# Patient Record
Sex: MALE | Race: WHITE | HISPANIC OR LATINO | Employment: UNEMPLOYED | URBAN - METROPOLITAN AREA
[De-identification: names, ages, dates, MRNs, and addresses within clinical notes are randomized per-mention and may not be internally consistent; named-entity substitution may affect disease eponyms.]

---

## 2018-04-11 LAB
ABSOL LYMPHOCYTES (HISTORICAL): 2 K/UL (ref 0.5–4)
ALBUMIN SERPL BCP-MCNC: 4.5 G/DL (ref 3–5.2)
ALP SERPL-CCNC: 54 U/L (ref 43–122)
ALT SERPL W P-5'-P-CCNC: 40 U/L (ref 9–52)
ANION GAP SERPL CALCULATED.3IONS-SCNC: 10 MMOL/L (ref 5–14)
AST SERPL W P-5'-P-CCNC: 28 U/L (ref 17–59)
BASOPHILS # BLD AUTO: 0 % (ref 0–1)
BASOPHILS # BLD AUTO: 0 K/UL (ref 0–0.1)
BILIRUB SERPL-MCNC: 0.4 MG/DL
BUN SERPL-MCNC: 9 MG/DL (ref 5–25)
CALCIUM SERPL-MCNC: 9.8 MG/DL (ref 8.4–10.2)
CHLORIDE SERPL-SCNC: 104 MEQ/L (ref 97–108)
CHOLEST SERPL-MCNC: 146 MG/DL
CHOLEST/HDLC SERPL: 2.6 {RATIO}
CO2 SERPL-SCNC: 28 MMOL/L (ref 22–30)
CREATINE, SERUM (HISTORICAL): 0.77 MG/DL (ref 0.7–1.5)
DEPRECATED RDW RBC AUTO: 12.6 %
EGFR (HISTORICAL): >60 ML/MIN/1.73 M2
EOSINOPHIL # BLD AUTO: 0.2 K/UL (ref 0–0.4)
EOSINOPHIL NFR BLD AUTO: 2 % (ref 0–6)
EST. AVERAGE GLUCOSE BLD GHB EST-MCNC: 206 MG/DL
GLUCOSE SERPL-MCNC: 242 MG/DL (ref 70–99)
HBA1C MFR BLD HPLC: 8.8 %
HCT VFR BLD AUTO: 44.7 % (ref 41–53)
HDLC SERPL-MCNC: 57 MG/DL
HGB BLD-MCNC: 14.9 G/DL (ref 13.5–17.5)
LDL/HDL RATIO (HISTORICAL): 1.2
LDLC SERPL CALC-MCNC: 70 MG/DL
LYMPHOCYTES NFR BLD AUTO: 23 % (ref 25–45)
MCH RBC QN AUTO: 31 PG (ref 26–34)
MCHC RBC AUTO-ENTMCNC: 33.3 % (ref 31–36)
MCV RBC AUTO: 93 FL (ref 80–100)
MONOCYTES # BLD AUTO: 0.7 K/UL (ref 0.2–0.9)
MONOCYTES NFR BLD AUTO: 8 % (ref 1–10)
NEUTROPHILS ABS COUNT (HISTORICAL): 5.9 K/UL (ref 1.8–7.8)
NEUTS SEG NFR BLD AUTO: 67 % (ref 45–65)
PLATELET # BLD AUTO: 238 K/MCL (ref 150–450)
POTASSIUM SERPL-SCNC: 4.6 MEQ/L (ref 3.6–5)
RBC # BLD AUTO: 4.81 M/MCL (ref 4.5–5.9)
SODIUM SERPL-SCNC: 143 MEQ/L (ref 137–147)
TOTAL PROTEIN (HISTORICAL): 7.4 G/DL (ref 5.9–8.4)
TRIGL SERPL-MCNC: 96 MG/DL
TSH SERPL DL<=0.05 MIU/L-ACNC: 2.3 UIU/ML (ref 0.47–4.68)
VLDLC SERPL CALC-MCNC: 19 MG/DL (ref 0–40)
WBC # BLD AUTO: 8.9 K/MCL (ref 4.5–11)

## 2018-08-13 ENCOUNTER — TELEPHONE (OUTPATIENT)
Dept: FAMILY MEDICINE CLINIC | Facility: CLINIC | Age: 48
End: 2018-08-13

## 2018-08-13 DIAGNOSIS — Z79.4 TYPE 2 DIABETES MELLITUS WITH HYPERGLYCEMIA, WITH LONG-TERM CURRENT USE OF INSULIN (HCC): Primary | ICD-10-CM

## 2018-08-13 DIAGNOSIS — E11.65 TYPE 2 DIABETES MELLITUS WITH HYPERGLYCEMIA, WITH LONG-TERM CURRENT USE OF INSULIN (HCC): Primary | ICD-10-CM

## 2018-08-13 DIAGNOSIS — I10 ESSENTIAL HYPERTENSION: ICD-10-CM

## 2018-08-13 RX ORDER — INSULIN GLARGINE 100 [IU]/ML
25 INJECTION, SOLUTION SUBCUTANEOUS DAILY
Qty: 10 ML | Refills: 0 | Status: SHIPPED | OUTPATIENT
Start: 2018-08-13 | End: 2018-08-15

## 2018-08-13 RX ORDER — LISINOPRIL AND HYDROCHLOROTHIAZIDE 20; 12.5 MG/1; MG/1
1 TABLET ORAL DAILY
Qty: 30 TABLET | Refills: 0 | Status: SHIPPED | OUTPATIENT
Start: 2018-08-13 | End: 2018-10-03 | Stop reason: SDUPTHER

## 2018-08-13 NOTE — TELEPHONE ENCOUNTER
Patient requesting refills on his medications pt states all meds  He doesn't remember how to say the blood pressure one    Metformin 500mg  Lantus(insulin)

## 2018-08-15 ENCOUNTER — TELEPHONE (OUTPATIENT)
Dept: FAMILY MEDICINE CLINIC | Facility: CLINIC | Age: 48
End: 2018-08-15

## 2018-08-15 DIAGNOSIS — Z79.4 TYPE 2 DIABETES MELLITUS WITH HYPERGLYCEMIA, WITH LONG-TERM CURRENT USE OF INSULIN (HCC): Primary | ICD-10-CM

## 2018-08-15 DIAGNOSIS — E11.65 TYPE 2 DIABETES MELLITUS WITH HYPERGLYCEMIA, WITH LONG-TERM CURRENT USE OF INSULIN (HCC): Primary | ICD-10-CM

## 2018-10-03 DIAGNOSIS — Z79.4 TYPE 2 DIABETES MELLITUS WITHOUT COMPLICATION, WITH LONG-TERM CURRENT USE OF INSULIN (HCC): Primary | ICD-10-CM

## 2018-10-03 DIAGNOSIS — Z79.4 TYPE 2 DIABETES MELLITUS WITH HYPERGLYCEMIA, WITH LONG-TERM CURRENT USE OF INSULIN (HCC): ICD-10-CM

## 2018-10-03 DIAGNOSIS — E11.9 TYPE 2 DIABETES MELLITUS WITHOUT COMPLICATION, WITH LONG-TERM CURRENT USE OF INSULIN (HCC): Primary | ICD-10-CM

## 2018-10-03 DIAGNOSIS — I10 ESSENTIAL HYPERTENSION: ICD-10-CM

## 2018-10-03 DIAGNOSIS — E11.65 TYPE 2 DIABETES MELLITUS WITH HYPERGLYCEMIA, WITH LONG-TERM CURRENT USE OF INSULIN (HCC): ICD-10-CM

## 2018-10-03 RX ORDER — PEN NEEDLE, DIABETIC 32GX 5/32"
NEEDLE, DISPOSABLE MISCELLANEOUS
Qty: 100 EACH | Refills: 0 | Status: SHIPPED | OUTPATIENT
Start: 2018-10-03 | End: 2019-06-27 | Stop reason: SDUPTHER

## 2018-10-09 RX ORDER — LISINOPRIL AND HYDROCHLOROTHIAZIDE 20; 12.5 MG/1; MG/1
TABLET ORAL
Qty: 30 TABLET | Refills: 0 | Status: SHIPPED | OUTPATIENT
Start: 2018-10-09 | End: 2019-02-12 | Stop reason: HOSPADM

## 2019-02-02 ENCOUNTER — APPOINTMENT (EMERGENCY)
Dept: CT IMAGING | Facility: HOSPITAL | Age: 49
DRG: 469 | End: 2019-02-02
Payer: COMMERCIAL

## 2019-02-02 ENCOUNTER — HOSPITAL ENCOUNTER (INPATIENT)
Facility: HOSPITAL | Age: 49
LOS: 10 days | Discharge: HOME | DRG: 469 | End: 2019-02-12
Attending: EMERGENCY MEDICINE | Admitting: INTERNAL MEDICINE
Payer: COMMERCIAL

## 2019-02-02 ENCOUNTER — APPOINTMENT (EMERGENCY)
Dept: RADIOLOGY | Facility: HOSPITAL | Age: 49
DRG: 469 | End: 2019-02-02
Payer: COMMERCIAL

## 2019-02-02 DIAGNOSIS — Z79.4 TYPE 2 DIABETES MELLITUS WITH HYPERGLYCEMIA, WITH LONG-TERM CURRENT USE OF INSULIN (HCC): ICD-10-CM

## 2019-02-02 DIAGNOSIS — E87.1 HYPONATREMIA: ICD-10-CM

## 2019-02-02 DIAGNOSIS — N18.30 ANEMIA DUE TO STAGE 3 CHRONIC KIDNEY DISEASE (HCC): ICD-10-CM

## 2019-02-02 DIAGNOSIS — D64.9 ANEMIA: Primary | ICD-10-CM

## 2019-02-02 DIAGNOSIS — F10.10 ALCOHOL ABUSE: ICD-10-CM

## 2019-02-02 DIAGNOSIS — N17.9 ACUTE KIDNEY FAILURE (HCC): ICD-10-CM

## 2019-02-02 DIAGNOSIS — E11.65 TYPE 2 DIABETES MELLITUS WITH HYPERGLYCEMIA, WITH LONG-TERM CURRENT USE OF INSULIN (HCC): ICD-10-CM

## 2019-02-02 DIAGNOSIS — N17.9 AKI (ACUTE KIDNEY INJURY) (HCC): ICD-10-CM

## 2019-02-02 DIAGNOSIS — N17.9 ACUTE RENAL FAILURE (HCC): ICD-10-CM

## 2019-02-02 DIAGNOSIS — R73.9 HYPERGLYCEMIA: ICD-10-CM

## 2019-02-02 DIAGNOSIS — D63.1 ANEMIA DUE TO STAGE 3 CHRONIC KIDNEY DISEASE (HCC): ICD-10-CM

## 2019-02-02 LAB
ABO GROUP BLD: NORMAL
ACETONE SERPL-MCNC: NEGATIVE MG/DL
ALBUMIN SERPL BCP-MCNC: 3 G/DL (ref 3.5–5)
ALP SERPL-CCNC: 74 U/L (ref 46–116)
ALT SERPL W P-5'-P-CCNC: 37 U/L (ref 12–78)
ANION GAP SERPL CALCULATED.3IONS-SCNC: 14 MMOL/L (ref 4–13)
ANION GAP SERPL CALCULATED.3IONS-SCNC: 15 MMOL/L (ref 4–13)
APTT PPP: 31 SECONDS (ref 26–38)
AST SERPL W P-5'-P-CCNC: 18 U/L (ref 5–45)
BACTERIA UR QL AUTO: ABNORMAL /HPF
BASE EX.OXY STD BLDV CALC-SCNC: 73.2 % (ref 60–80)
BASE EXCESS BLDV CALC-SCNC: -7.8 MMOL/L
BASOPHILS # BLD AUTO: 0.04 THOUSANDS/ΜL (ref 0–0.1)
BASOPHILS NFR BLD AUTO: 0 % (ref 0–1)
BETA-HYDROXYBUTYRATE: 0.16 MMOL/L (ref 0.02–0.27)
BILIRUB SERPL-MCNC: 0.47 MG/DL (ref 0.2–1)
BILIRUB UR QL STRIP: NEGATIVE
BLD GP AB SCN SERPL QL: NEGATIVE
BUN SERPL-MCNC: 69 MG/DL (ref 5–25)
BUN SERPL-MCNC: 78 MG/DL (ref 5–25)
CALCIUM SERPL-MCNC: 7.1 MG/DL (ref 8.3–10.1)
CALCIUM SERPL-MCNC: 7.2 MG/DL (ref 8.3–10.1)
CHLORIDE SERPL-SCNC: 103 MMOL/L (ref 100–108)
CHLORIDE SERPL-SCNC: 90 MMOL/L (ref 100–108)
CLARITY UR: CLEAR
CO2 SERPL-SCNC: 18 MMOL/L (ref 21–32)
CO2 SERPL-SCNC: 19 MMOL/L (ref 21–32)
COLOR UR: YELLOW
CREAT SERPL-MCNC: 6.58 MG/DL (ref 0.6–1.3)
CREAT SERPL-MCNC: 7.65 MG/DL (ref 0.6–1.3)
EOSINOPHIL # BLD AUTO: 0.27 THOUSAND/ΜL (ref 0–0.61)
EOSINOPHIL NFR BLD AUTO: 3 % (ref 0–6)
ERYTHROCYTE [DISTWIDTH] IN BLOOD BY AUTOMATED COUNT: 11.6 % (ref 11.6–15.1)
GFR SERPL CREATININE-BSD FRML MDRD: 8 ML/MIN/1.73SQ M
GFR SERPL CREATININE-BSD FRML MDRD: 9 ML/MIN/1.73SQ M
GLUCOSE SERPL-MCNC: 115 MG/DL (ref 65–140)
GLUCOSE SERPL-MCNC: 129 MG/DL (ref 65–140)
GLUCOSE SERPL-MCNC: 135 MG/DL (ref 65–140)
GLUCOSE SERPL-MCNC: 157 MG/DL (ref 65–140)
GLUCOSE SERPL-MCNC: 202 MG/DL (ref 65–140)
GLUCOSE SERPL-MCNC: 295 MG/DL (ref 65–140)
GLUCOSE SERPL-MCNC: 353 MG/DL (ref 65–140)
GLUCOSE SERPL-MCNC: 36 MG/DL (ref 65–140)
GLUCOSE SERPL-MCNC: 448 MG/DL (ref 65–140)
GLUCOSE SERPL-MCNC: 72 MG/DL (ref 65–140)
GLUCOSE SERPL-MCNC: 856 MG/DL (ref 65–140)
GLUCOSE SERPL-MCNC: 94 MG/DL (ref 65–140)
GLUCOSE SERPL-MCNC: >500 MG/DL (ref 65–140)
GLUCOSE UR STRIP-MCNC: ABNORMAL MG/DL
HCO3 BLDV-SCNC: 17.6 MMOL/L (ref 24–30)
HCT VFR BLD AUTO: 17.8 % (ref 36.5–49.3)
HGB BLD-MCNC: 6 G/DL (ref 12–17)
HGB UR QL STRIP.AUTO: ABNORMAL
IMM GRANULOCYTES # BLD AUTO: 0.12 THOUSAND/UL (ref 0–0.2)
IMM GRANULOCYTES NFR BLD AUTO: 1 % (ref 0–2)
INR PPP: 1.01 (ref 0.86–1.17)
KETONES UR STRIP-MCNC: NEGATIVE MG/DL
LACTATE SERPL-SCNC: 2.5 MMOL/L (ref 0.5–2)
LACTATE SERPL-SCNC: 3.1 MMOL/L (ref 0.5–2)
LEUKOCYTE ESTERASE UR QL STRIP: ABNORMAL
LIPASE SERPL-CCNC: 29 U/L (ref 73–393)
LYMPHOCYTES # BLD AUTO: 1.16 THOUSANDS/ΜL (ref 0.6–4.47)
LYMPHOCYTES NFR BLD AUTO: 13 % (ref 14–44)
MAGNESIUM SERPL-MCNC: 1.3 MG/DL (ref 1.6–2.6)
MCH RBC QN AUTO: 33.9 PG (ref 26.8–34.3)
MCHC RBC AUTO-ENTMCNC: 33.7 G/DL (ref 31.4–37.4)
MCV RBC AUTO: 101 FL (ref 82–98)
MONOCYTES # BLD AUTO: 0.97 THOUSAND/ΜL (ref 0.17–1.22)
MONOCYTES NFR BLD AUTO: 11 % (ref 4–12)
NEUTROPHILS # BLD AUTO: 6.37 THOUSANDS/ΜL (ref 1.85–7.62)
NEUTS SEG NFR BLD AUTO: 72 % (ref 43–75)
NITRITE UR QL STRIP: NEGATIVE
NON-SQ EPI CELLS URNS QL MICRO: ABNORMAL /HPF
NRBC BLD AUTO-RTO: 0 /100 WBCS
O2 CT BLDV-SCNC: 7.2 ML/DL
OSMOLALITY UR/SERPL-RTO: 326 MMOL/KG (ref 282–298)
PCO2 BLDV: 35.3 MM HG (ref 42–50)
PH BLDV: 7.32 [PH] (ref 7.3–7.4)
PH UR STRIP.AUTO: 5 [PH] (ref 4.5–8)
PHOSPHATE SERPL-MCNC: 4.5 MG/DL (ref 2.7–4.5)
PLATELET # BLD AUTO: 404 THOUSANDS/UL (ref 149–390)
PMV BLD AUTO: 11.4 FL (ref 8.9–12.7)
PO2 BLDV: 41.7 MM HG (ref 35–45)
POTASSIUM SERPL-SCNC: 3.5 MMOL/L (ref 3.5–5.3)
POTASSIUM SERPL-SCNC: 5.1 MMOL/L (ref 3.5–5.3)
PROCALCITONIN SERPL-MCNC: 0.42 NG/ML
PROT SERPL-MCNC: 6.1 G/DL (ref 6.4–8.2)
PROT UR STRIP-MCNC: NEGATIVE MG/DL
PROTHROMBIN TIME: 13.4 SECONDS (ref 11.8–14.2)
RBC # BLD AUTO: 1.77 MILLION/UL (ref 3.88–5.62)
RBC #/AREA URNS AUTO: ABNORMAL /HPF
RH BLD: POSITIVE
SODIUM SERPL-SCNC: 122 MMOL/L (ref 136–145)
SODIUM SERPL-SCNC: 137 MMOL/L (ref 136–145)
SP GR UR STRIP.AUTO: 1.01 (ref 1–1.03)
SPECIMEN EXPIRATION DATE: NORMAL
TROPONIN I SERPL-MCNC: <0.02 NG/ML
UROBILINOGEN UR QL STRIP.AUTO: 0.2 E.U./DL
WBC # BLD AUTO: 8.93 THOUSAND/UL (ref 4.31–10.16)
WBC #/AREA URNS AUTO: ABNORMAL /HPF

## 2019-02-02 PROCEDURE — 30233N1 TRANSFUSION OF NONAUTOLOGOUS RED BLOOD CELLS INTO PERIPHERAL VEIN, PERCUTANEOUS APPROACH: ICD-10-PCS | Performed by: EMERGENCY MEDICINE

## 2019-02-02 PROCEDURE — 99285 EMERGENCY DEPT VISIT HI MDM: CPT

## 2019-02-02 PROCEDURE — 81001 URINALYSIS AUTO W/SCOPE: CPT | Performed by: EMERGENCY MEDICINE

## 2019-02-02 PROCEDURE — 36430 TRANSFUSION BLD/BLD COMPNT: CPT

## 2019-02-02 PROCEDURE — 82607 VITAMIN B-12: CPT | Performed by: INTERNAL MEDICINE

## 2019-02-02 PROCEDURE — 82746 ASSAY OF FOLIC ACID SERUM: CPT | Performed by: INTERNAL MEDICINE

## 2019-02-02 PROCEDURE — 82550 ASSAY OF CK (CPK): CPT | Performed by: INTERNAL MEDICINE

## 2019-02-02 PROCEDURE — 84484 ASSAY OF TROPONIN QUANT: CPT | Performed by: EMERGENCY MEDICINE

## 2019-02-02 PROCEDURE — 83690 ASSAY OF LIPASE: CPT | Performed by: EMERGENCY MEDICINE

## 2019-02-02 PROCEDURE — 96374 THER/PROPH/DIAG INJ IV PUSH: CPT

## 2019-02-02 PROCEDURE — 83615 LACTATE (LD) (LDH) ENZYME: CPT | Performed by: INTERNAL MEDICINE

## 2019-02-02 PROCEDURE — 86920 COMPATIBILITY TEST SPIN: CPT

## 2019-02-02 PROCEDURE — 86850 RBC ANTIBODY SCREEN: CPT | Performed by: EMERGENCY MEDICINE

## 2019-02-02 PROCEDURE — 96361 HYDRATE IV INFUSION ADD-ON: CPT

## 2019-02-02 PROCEDURE — 74176 CT ABD & PELVIS W/O CONTRAST: CPT

## 2019-02-02 PROCEDURE — 84100 ASSAY OF PHOSPHORUS: CPT | Performed by: NURSE PRACTITIONER

## 2019-02-02 PROCEDURE — 70450 CT HEAD/BRAIN W/O DYE: CPT

## 2019-02-02 PROCEDURE — 36415 COLL VENOUS BLD VENIPUNCTURE: CPT | Performed by: EMERGENCY MEDICINE

## 2019-02-02 PROCEDURE — 80053 COMPREHEN METABOLIC PANEL: CPT | Performed by: EMERGENCY MEDICINE

## 2019-02-02 PROCEDURE — 84145 PROCALCITONIN (PCT): CPT | Performed by: EMERGENCY MEDICINE

## 2019-02-02 PROCEDURE — 82948 REAGENT STRIP/BLOOD GLUCOSE: CPT

## 2019-02-02 PROCEDURE — 86901 BLOOD TYPING SEROLOGIC RH(D): CPT | Performed by: EMERGENCY MEDICINE

## 2019-02-02 PROCEDURE — 85045 AUTOMATED RETICULOCYTE COUNT: CPT | Performed by: INTERNAL MEDICINE

## 2019-02-02 PROCEDURE — P9021 RED BLOOD CELLS UNIT: HCPCS

## 2019-02-02 PROCEDURE — 83735 ASSAY OF MAGNESIUM: CPT | Performed by: NURSE PRACTITIONER

## 2019-02-02 PROCEDURE — 85610 PROTHROMBIN TIME: CPT | Performed by: EMERGENCY MEDICINE

## 2019-02-02 PROCEDURE — 82009 KETONE BODYS QUAL: CPT | Performed by: EMERGENCY MEDICINE

## 2019-02-02 PROCEDURE — 82010 KETONE BODYS QUAN: CPT | Performed by: INTERNAL MEDICINE

## 2019-02-02 PROCEDURE — 83010 ASSAY OF HAPTOGLOBIN QUANT: CPT | Performed by: INTERNAL MEDICINE

## 2019-02-02 PROCEDURE — 82805 BLOOD GASES W/O2 SATURATION: CPT | Performed by: EMERGENCY MEDICINE

## 2019-02-02 PROCEDURE — 87040 BLOOD CULTURE FOR BACTERIA: CPT | Performed by: EMERGENCY MEDICINE

## 2019-02-02 PROCEDURE — 85730 THROMBOPLASTIN TIME PARTIAL: CPT | Performed by: EMERGENCY MEDICINE

## 2019-02-02 PROCEDURE — 71045 X-RAY EXAM CHEST 1 VIEW: CPT

## 2019-02-02 PROCEDURE — 86900 BLOOD TYPING SEROLOGIC ABO: CPT | Performed by: EMERGENCY MEDICINE

## 2019-02-02 PROCEDURE — 83605 ASSAY OF LACTIC ACID: CPT | Performed by: EMERGENCY MEDICINE

## 2019-02-02 PROCEDURE — 99223 1ST HOSP IP/OBS HIGH 75: CPT | Performed by: NURSE PRACTITIONER

## 2019-02-02 PROCEDURE — 80048 BASIC METABOLIC PNL TOTAL CA: CPT | Performed by: NURSE PRACTITIONER

## 2019-02-02 PROCEDURE — 85025 COMPLETE CBC W/AUTO DIFF WBC: CPT | Performed by: EMERGENCY MEDICINE

## 2019-02-02 PROCEDURE — 99254 IP/OBS CNSLTJ NEW/EST MOD 60: CPT | Performed by: INTERNAL MEDICINE

## 2019-02-02 PROCEDURE — 83930 ASSAY OF BLOOD OSMOLALITY: CPT | Performed by: EMERGENCY MEDICINE

## 2019-02-02 RX ORDER — HEPARIN SODIUM 5000 [USP'U]/ML
5000 INJECTION, SOLUTION INTRAVENOUS; SUBCUTANEOUS EVERY 8 HOURS SCHEDULED
Status: DISCONTINUED | OUTPATIENT
Start: 2019-02-02 | End: 2019-02-12 | Stop reason: HOSPADM

## 2019-02-02 RX ORDER — SODIUM CHLORIDE 9 MG/ML
2000 INJECTION, SOLUTION INTRAVENOUS CONTINUOUS
Status: DISPENSED | OUTPATIENT
Start: 2019-02-02 | End: 2019-02-02

## 2019-02-02 RX ORDER — DEXTROSE AND SODIUM CHLORIDE 5; .9 G/100ML; G/100ML
125 INJECTION, SOLUTION INTRAVENOUS CONTINUOUS
Status: DISCONTINUED | OUTPATIENT
Start: 2019-02-02 | End: 2019-02-03

## 2019-02-02 RX ORDER — SODIUM CHLORIDE 9 MG/ML
500 INJECTION, SOLUTION INTRAVENOUS CONTINUOUS
Status: DISPENSED | OUTPATIENT
Start: 2019-02-02 | End: 2019-02-02

## 2019-02-02 RX ORDER — DEXTROSE MONOHYDRATE 25 G/50ML
INJECTION, SOLUTION INTRAVENOUS
Status: COMPLETED
Start: 2019-02-02 | End: 2019-02-02

## 2019-02-02 RX ORDER — MAGNESIUM SULFATE HEPTAHYDRATE 40 MG/ML
2 INJECTION, SOLUTION INTRAVENOUS ONCE
Status: COMPLETED | OUTPATIENT
Start: 2019-02-02 | End: 2019-02-03

## 2019-02-02 RX ORDER — MAGNESIUM SULFATE HEPTAHYDRATE 40 MG/ML
4 INJECTION, SOLUTION INTRAVENOUS ONCE
Status: COMPLETED | OUTPATIENT
Start: 2019-02-02 | End: 2019-02-03

## 2019-02-02 RX ORDER — POTASSIUM CHLORIDE 14.9 MG/ML
20 INJECTION INTRAVENOUS
Status: COMPLETED | OUTPATIENT
Start: 2019-02-02 | End: 2019-02-02

## 2019-02-02 RX ORDER — SODIUM CHLORIDE 9 MG/ML
250 INJECTION, SOLUTION INTRAVENOUS CONTINUOUS
Status: DISPENSED | OUTPATIENT
Start: 2019-02-02 | End: 2019-02-03

## 2019-02-02 RX ADMIN — DEXTROSE AND SODIUM CHLORIDE 250 ML/HR: 5; .9 INJECTION, SOLUTION INTRAVENOUS at 17:30

## 2019-02-02 RX ADMIN — SODIUM CHLORIDE 15 UNITS/HR: 9 INJECTION, SOLUTION INTRAVENOUS at 15:26

## 2019-02-02 RX ADMIN — POTASSIUM CHLORIDE 20 MEQ: 200 INJECTION, SOLUTION INTRAVENOUS at 20:52

## 2019-02-02 RX ADMIN — SODIUM CHLORIDE 1000 ML: 0.9 INJECTION, SOLUTION INTRAVENOUS at 14:00

## 2019-02-02 RX ADMIN — POTASSIUM CHLORIDE 20 MEQ: 200 INJECTION, SOLUTION INTRAVENOUS at 22:17

## 2019-02-02 RX ADMIN — DEXTROSE AND SODIUM CHLORIDE 250 ML/HR: 5; .9 INJECTION, SOLUTION INTRAVENOUS at 21:19

## 2019-02-02 RX ADMIN — SODIUM CHLORIDE 1000 ML: 0.9 INJECTION, SOLUTION INTRAVENOUS at 13:02

## 2019-02-02 RX ADMIN — DEXTROSE 50 % IN WATER (D50W) INTRAVENOUS SYRINGE 25 ML: at 17:35

## 2019-02-02 RX ADMIN — MAGNESIUM SULFATE HEPTAHYDRATE 4 G: 40 INJECTION, SOLUTION INTRAVENOUS at 21:04

## 2019-02-02 RX ADMIN — SODIUM CHLORIDE 5 UNITS/HR: 9 INJECTION, SOLUTION INTRAVENOUS at 15:46

## 2019-02-02 RX ADMIN — INSULIN HUMAN 10 UNITS: 100 INJECTION, SOLUTION PARENTERAL at 14:02

## 2019-02-02 RX ADMIN — HEPARIN SODIUM 5000 UNITS: 5000 INJECTION INTRAVENOUS; SUBCUTANEOUS at 16:55

## 2019-02-02 RX ADMIN — SODIUM CHLORIDE 500 ML/HR: 0.9 INJECTION, SOLUTION INTRAVENOUS at 16:02

## 2019-02-02 NOTE — ED NOTES
Dr Lachelle Saenz at pts bedside for re evaluation and discussion of lab results      Radha Cristina RN  02/02/19 4663

## 2019-02-02 NOTE — ED PROVIDER NOTES
History  Chief Complaint   Patient presents with    Dizziness     dizziness, back pain, hx kidney issues  Arrived yesterday from , was hospitalized there for "kidney problems", states physician states he need to come to [address] for further treatment  occasional CP/SOB  50year-old male Turkish-speaking only presenting with multiple complaints  Patient reportedly lives in the area, however was visiting the [facility] for the past 1 month  Apparently, he became sick about 2 weeks ago while there with nausea, vomiting and diarrhea  He reports that he was hospitalized there twice, most recently for the past 2 days and discharged yesterday  He flew home last night and comes to the ED today with worsening symptoms  He reports that while he was there, his glucose was elevated and he was on an insulin drip  He does admit that he did not have his insulin while traveling and had been without it for this past month  He also reports that he was told that he was having kidney problems and that he might need dialysis  Reports that he never had kidney problems in the past   He has been making good urine  Denies any headache, CP, fevers, abdominal pain  He does complain of low back pain, but reports that this has been an ongoing problem, occasionally will radiate down both legs  A/P:  50year old male with nausea/vomiting/generalized weakness, will get cardiac workup/septic workup/rule out DKA, patient with some confusion therefore will get CTH to rule out intracranial bleed/mass and CTA/P to assess for intra-abdominal pathology to be causing the nausea/vomiting/diarrhea/back pain            Prior to Admission Medications   Prescriptions Last Dose Informant Patient Reported? Taking?    UNIFINE PENTIPS 32G X 4 MM MISC Unknown at Unknown time  No No   Sig: TO BE USED WITH BASAGLAR   insulin glargine (BASAGLAR KWIKPEN) 100 units/mL injection pen Past Month at Unknown time  No Yes   Sig: Inject 25 Units under the skin daily at bedtime   lisinopril-hydrochlorothiazide (PRINZIDE,ZESTORETIC) 20-12 5 MG per tablet Unknown at Unknown time  No No   Sig: TAKE ONE TABLET BY MOUTH EVERY DAY   metFORMIN (GLUCOPHAGE) 500 mg tablet Unknown at Unknown time  No No   Sig: TAKE ONE TABLET BY MOUTH 2 TIMES A DAY WITH MEALS   naproxen (NAPROSYN) 500 mg tablet Unknown at Unknown time  No No   Sig: Take 1 tablet by mouth 2 (two) times a day with meals      Facility-Administered Medications: None       Past Medical History:   Diagnosis Date    Alcohol abuse     [facility] pancreatitis ([facility] )     Diabetes mellitus ([place] [address] )     Type 2    Hypertension     Pancreatitis     Paroxysmal A-fib (HCC)     Thrombocytopenia ([place] [address] )        History reviewed  No pertinent surgical history  Family History   Problem Relation Age of Onset    Diabetes Mother     Hypertension Mother     Hyperlipidemia Father      I have reviewed and agree with the history as documented  Social History   Substance Use Topics    Smoking status: Former Smoker    Smokeless tobacco: Not on file    Alcohol use Yes      Comment: Drinks alot every day        Review of Systems   Constitutional: Negative for chills, fever and unexpected weight change  HENT: Negative for ear pain, rhinorrhea and sore throat  Eyes: Negative for pain and visual disturbance  Respiratory: Negative for cough and shortness of breath  Cardiovascular: Negative for chest pain and leg swelling  Gastrointestinal: Positive for diarrhea, nausea and vomiting  Negative for abdominal pain and constipation  Endocrine: Negative for polydipsia, polyphagia and polyuria  Genitourinary: Negative for dysuria, frequency, hematuria and urgency  Musculoskeletal: Negative for back pain, myalgias and neck pain  Skin: Negative for color change and rash  Allergic/Immunologic: Negative for environmental allergies and immunocompromised state     Neurological: Negative for dizziness, weakness, light-headedness, numbness and headaches  Hematological: Negative for adenopathy  Does not bruise/bleed easily  Psychiatric/Behavioral: Negative for agitation and confusion  All other systems reviewed and are negative  Physical Exam  Physical Exam   Constitutional: He is oriented to person, place, and time  He appears well-developed and well-nourished  HENT:   Head: Normocephalic and atraumatic  Nose: Nose normal    Mouth/Throat: Oropharynx is clear and moist    Eyes: Conjunctivae and EOM are normal    Neck: Normal range of motion  Neck supple  Cardiovascular: Normal rate, regular rhythm, normal heart sounds and intact distal pulses  Pulmonary/Chest: Effort normal and breath sounds normal  No stridor  No respiratory distress  He has no wheezes  He has no rales  He exhibits no tenderness  Abdominal: Soft  He exhibits no distension  There is no tenderness  There is no rebound and no guarding  Musculoskeletal: He exhibits no edema or deformity  Neurological: He is alert and oriented to person, place, and time  He exhibits normal muscle tone  Coordination normal    Skin: Skin is warm and dry  No rash noted  Psychiatric: He has a normal mood and affect  Judgment and thought content normal    Nursing note and vitals reviewed        Vital Signs  ED Triage Vitals   Temperature Pulse Respirations Blood Pressure SpO2   02/02/19 1224 02/02/19 1224 02/02/19 1224 02/02/19 1224 02/02/19 1224   97 6 °F (36 4 °C) 87 20 135/63 100 %      Temp Source Heart Rate Source Patient Position - Orthostatic VS BP Location FiO2 (%)   02/02/19 1348 02/02/19 1348 02/02/19 1348 02/02/19 1348 --   Oral Monitor Lying Right arm       Pain Score       02/02/19 1224       Worst Possible Pain           Vitals:    02/05/19 1539 02/05/19 2300 02/06/19 0716 02/06/19 1516   BP: 122/78 121/81 117/72 118/66   Pulse: 81 65 63 63   Patient Position - Orthostatic VS: Lying Lying Lying Lying       Visual Acuity      ED Medications  Medications   heparin (porcine) subcutaneous injection 5,000 Units (5,000 Units Subcutaneous Given 2/6/19 2208)   sodium chloride 0 9 % infusion (2,000 mL/hr Intravenous Not Given 2/2/19 1600)     Followed by   sodium chloride 0 9 % infusion (500 mL/hr Intravenous New Bag 2/2/19 1602)     Followed by   sodium chloride 0 9 % infusion (250 mL/hr Intravenous Not Given 2/2/19 2105)   sodium chloride 0 9 % infusion (100 mL/hr Intravenous New Bag 2/5/19 1917)   insulin lispro (HumaLOG) 100 units/mL subcutaneous injection 1-5 Units (1 Units Subcutaneous Given 2/6/19 1700)   pantoprazole (PROTONIX) EC tablet 40 mg (40 mg Oral Given 2/6/19 0538)   thiamine (VITAMIN B1) tablet 100 mg (100 mg Oral Given 9/7/23 1595)   folic acid (FOLVITE) tablet 1 mg (1 mg Oral Given 2/6/19 0843)   insulin lispro (HumaLOG) 100 units/mL subcutaneous injection 2 Units (2 Units Subcutaneous Given 2/6/19 1700)   insulin glargine (LANTUS) subcutaneous injection 4 Units 0 04 mL (4 Units Subcutaneous Given 2/6/19 2208)   sodium chloride 0 9 % bolus 1,000 mL (0 mL Intravenous Stopped 2/2/19 1402)   sodium chloride 0 9 % bolus 1,000 mL (0 mL Intravenous Stopped 2/2/19 1500)   insulin regular (HumuLIN R,NovoLIN R) injection 10 Units (10 Units Intravenous Given 2/2/19 1402)   dextrose 50 % IV solution **ADS Override Pull** (25 mL  Given 2/2/19 1735)   potassium chloride 20 mEq IVPB (premix) (0 mEq Intravenous Stopped 2/2/19 2252)   magnesium sulfate 4 g/100 mL IVPB (premix) 4 g (0 g Intravenous Stopped 2/3/19 0104)     Followed by   magnesium sulfate 2 g/50 mL IVPB (premix) 2 g (2 g Intravenous New Bag 2/3/19 0049)   insulin glargine (LANTUS) subcutaneous injection 15 Units 0 15 mL (15 Units Subcutaneous Given 2/3/19 0739)   potassium chloride (K-DUR,KLOR-CON) CR tablet 40 mEq (40 mEq Oral Given 2/3/19 5134)   glucose 4 g chewable tablet **ADS Override Pull** (4 g  Given 2/4/19 2119)       Diagnostic Studies  Results Reviewed     Procedure Component Value Units Date/Time    Blood culture #1 [[id number]] Collected:  02/02/19 1311    Lab Status:  Preliminary result Specimen:  Blood from Arm, Left Updated:  02/06/19 2201     Blood Culture No Growth After 4 Days  Blood culture #2 [[id number]] Collected:  02/02/19 1259    Lab Status:  Preliminary result Specimen:  Blood from Hand, Left Updated:  02/06/19 1801     Blood Culture No Growth After 4 Days  Osmolality [[id number]]  (Abnormal) Collected:  02/02/19 1431    Lab Status:  Final result Specimen:  Blood from Arm, Left Updated:  02/02/19 2158     Osmolality Serum 326 (H) mmol/KG     Beta Hydroxybutyrate [[id number]]  (Normal) Collected:  02/02/19 1246    Lab Status:  Final result Specimen:  Blood Updated:  02/02/19 2038     BETA-HYDROXYBUTYRATE 0 16 mmol/L     Lactic Acid x2 [[id number]]  (Abnormal) Collected:  02/02/19 1525    Lab Status:  Final result Specimen:  Blood from Arm, Left Updated:  02/02/19 1606     LACTIC ACID 3 1 (HH) mmol/L     Narrative:         Result may be elevated if tourniquet was used during collection  Platelet count [[id number]]     Lab Status:  No result Specimen:  Blood     Fingerstick Glucose (POCT) [[id number]]  (Abnormal) Collected:  02/02/19 1512    Lab Status:  Final result Updated:  02/02/19 1513     POC Glucose 448 (H) mg/dl     Procalcitonin [[id number]]  (Abnormal) Collected:  02/02/19 1245    Lab Status:  Final result Specimen:  Blood from Arm, Right Updated:  02/02/19 1437     Procalcitonin 0 42 (H) ng/ml     Fingerstick Glucose (POCT) [[id number]]  (Abnormal) Collected:  02/02/19 1435    Lab Status:  Final result Updated:  02/02/19 1436     POC Glucose >500 (HH) mg/dl     Lactic Acid x2 [[id number]]  (Abnormal) Collected:  02/02/19 1311    Lab Status:  Final result Specimen:  Blood from Arm, Left Updated:  02/02/19 1342     LACTIC ACID 2 5 (HH) mmol/L     Narrative:         Result may be elevated if tourniquet was used during collection      Urine Microscopic [[id number]]  (Abnormal) Collected:  02/02/19 1253    Lab Status:  Final result Specimen:  Urine from Urine, Clean Catch Updated:  02/02/19 1328     RBC, UA None Seen /hpf      WBC, UA 2-4 (A) /hpf      Epithelial Cells Occasional /hpf      Bacteria, UA None Seen /hpf     Comprehensive metabolic panel [[id number]]  (Abnormal) Collected:  02/02/19 1246    Lab Status:  Final result Specimen:  Blood from Arm, Right Updated:  02/02/19 1326     Sodium 122 (L) mmol/L      Potassium 5 1 mmol/L      Chloride 90 (L) mmol/L      CO2 18 (L) mmol/L      ANION GAP 14 (H) mmol/L      BUN 78 (H) mg/dL      Creatinine 7 65 (H) mg/dL      Glucose 856 (HH) mg/dL      Calcium 7 1 (L) mg/dL      AST 18 U/L      ALT 37 U/L      Alkaline Phosphatase 74 U/L      Total Protein 6 1 (L) g/dL      Albumin 3 0 (L) g/dL      Total Bilirubin 0 47 mg/dL      eGFR 8 ml/min/1 73sq m     Narrative:         National Kidney Disease Education Program recommendations are as follows:  GFR calculation is accurate only with a steady state creatinine  Chronic Kidney disease less than 60 ml/min/1 73 sq  meters  Kidney failure less than 15 ml/min/1 73 sq  meters      CBC and differential [[id number]]  (Abnormal) Collected:  02/02/19 1246    Lab Status:  Final result Specimen:  Blood from Arm, Right Updated:  02/02/19 1324     WBC 8 93 Thousand/uL      RBC 1 77 (L) Million/uL      Hemoglobin 6 0 (LL) g/dL      Hematocrit 17 8 (L) %       (H) fL      MCH 33 9 pg      MCHC 33 7 g/dL      RDW 11 6 %      MPV 11 4 fL      Platelets 410 (H) Thousands/uL      nRBC 0 /100 WBCs      Neutrophils Relative 72 %      Immat GRANS % 1 %      Lymphocytes Relative 13 (L) %      Monocytes Relative 11 %      Eosinophils Relative 3 %      Basophils Relative 0 %      Neutrophils Absolute 6 37 Thousands/µL      Immature Grans Absolute 0 12 Thousand/uL      Lymphocytes Absolute 1 16 Thousands/µL      Monocytes Absolute 0 97 Thousand/µL      Eosinophils Absolute 0 27 Thousand/µL Basophils Absolute 0 04 Thousands/µL     Fingerstick Glucose (POCT) [[id number]]  (Abnormal) Collected:  02/02/19 1323    Lab Status:  Final result Updated:  02/02/19 1324     POC Glucose >500 (HH) mg/dl     Troponin I [[id number]]  (Normal) Collected:  02/02/19 1246    Lab Status:  Final result Specimen:  Blood from Arm, Right Updated:  02/02/19 1316     Troponin I <0 02 ng/mL     Lipase [[id number]]  (Abnormal) Collected:  02/02/19 1245    Lab Status:  Final result Specimen:  Blood from Arm, Right Updated:  02/02/19 1308     Lipase 29 (L) u/L     Acetone [[id number]]  (Normal) Collected:  02/02/19 1245    Lab Status:  Final result Specimen:  Blood from Arm, Right Updated:  02/02/19 1308     Acetone, Bld Negative    Urinalysis with reflex to microscopic [[id number]]  (Abnormal) Collected:  02/02/19 1253    Lab Status:  Final result Specimen:  Urine from Urine, Clean Catch Updated:  02/02/19 1307     Color, UA Yellow     Clarity, UA Clear     Specific Gravity, UA 1 010     pH, UA 5 0     Leukocytes, UA Elevated glucose may cause decreased leukocyte values   See urine microscopic for [facility] result/ (A)     Nitrite, UA Negative     Protein, UA Negative mg/dl      Glucose, UA >=1000 (1%) (A) mg/dl      Ketones, UA Negative mg/dl      Urobilinogen, UA 0 2 E U /dl      Bilirubin, UA Negative     Blood, UA Trace-lysed (A)    Protime-INR [[id number]]  (Normal) Collected:  02/02/19 1245    Lab Status:  Final result Specimen:  Blood from Arm, Right Updated:  02/02/19 1306     Protime 13 4 seconds      INR 1 01    APTT [[id number]]  (Normal) Collected:  02/02/19 1245    Lab Status:  Final result Specimen:  Blood from Arm, Right Updated:  02/02/19 1306     PTT 31 seconds     Blood gas, venous [[id number]]  (Abnormal) Collected:  02/02/19 1245    Lab Status:  Final result Specimen:  Blood from Arm, Right Updated:  02/02/19 1254     pH, Sami 7 316     pCO2, Sami 35 3 (L) mm Hg      pO2, Sami 41 7 mm Hg      HCO3, Sami 17 6 (L) mmol/L Base Excess, Sami -7 8 mmol/L      O2 Content, Sami 7 2 ml/dL      O2 HGB, VENOUS 73 2 %     Fingerstick Glucose (POCT) [[id number]]  (Abnormal) Collected:  02/02/19 1237    Lab Status:  Final result Updated:  02/02/19 1239     POC Glucose >500 (HH) mg/dl                  US kidney and bladder   Final Result by Chuck Farley MD (02/04 2003)      Echogenic renal parenchyma consistent with medical renal disease  No hydronephrosis  Workstation performed: [id number]         XR chest portable   ED Interpretation by Chris Foster DO (02/02 1442)   IMPRESSION:       No acute cardiopulmonary disease  Final Result by Fermin Espinosa DO (02/02 1412)      No acute cardiopulmonary disease  Workstation performed: [id number]         CT abdomen pelvis wo contrast   ED Interpretation by Chris Foster DO (02/02 1442)   IMPRESSION:       Minimal right calyceal dilatation/questionable hydronephrosis  Final Result by Sandra Curiel MD (02/02 1439)      Minimal right calyceal dilatation/questionable hydronephrosis  Workstation performed: [id number]         CT head without contrast   ED Interpretation by Chris Foster DO (02/02 1431)   IMPRESSION:       No acute intracranial abnormality  Final Result by Alpheus Dance, MD (02/02 1427)      No acute intracranial abnormality                    Workstation performed: [id number]         IR consult    (Results Pending)              Procedures  CriticalCare Time  Performed by: Hansel Morin  Authorized by: Andi HU     Critical care provider statement:     Critical care time was exclusive of:  Separately billable procedures and treating other patients    Critical care was necessary to treat or prevent imminent or life-threatening deterioration of the following conditions:  Circulatory failure, CNS failure or compromise, dehydration, endocrine crisis, metabolic crisis, renal failure, sepsis, shock and toxidrome    Critical care was time spent personally by me on the following activities:  Obtaining history from patient or surrogate, development of treatment plan with patient or surrogate, discussions with consultants, evaluation of patient's response to treatment, examination of patient, ordering and performing treatments and interventions, ordering and review of laboratory studies, ordering and review of radiographic studies, re-evaluation of patient's condition and review of old charts      EKG:  Sinus, normal axis, inverted t wave avF, no ST changes    Phone Contacts  ED Phone Contact    ED Course  ED Course as of Feb 06 2212   Sat Feb 02, 2019   1302 POC Glucose: (!!) >500   1308 Ketones, UA: Negative   1326 Hemoglobin: (!!) 6 0   1326 Sodium: (!) 122   1326 Creatinine: (!) 7 65   1326 BUN: (!) 78   1343 Patient denies any blood in his vomit or stool  He is refusing a rectal exam for Hemoccult testing  He is currently refusing blood transfusion, he reports that he was told he needed blood while in the [facility] as well and refused at that time too    1343 LACTIC ACID: (!!) 2 5   1503 Discussed with ICU who is coming down to see the patient now    1516 POC Glucose: (!) 448   1520 Pt is now agreeable to blood transfusion, consent signed and on chart                          Initial Sepsis Screening     [address] Name 02/02/19 1344                Is the patient's history suggestive of a new or worsening infection? No  -KH        Suspected source of infection          Are two or more of the following signs & symptoms of infection both present and new to the patient?  No  -KH        Indicate SIRS criteria          If the answer is yes to both questions, suspicion of sepsis is present          If severe sepsis is present AND tissue hypoperfusion perists in the hour after fluid resuscitation or lactate > 4, the patient meets criteria for SEPTIC SHOCK          Are any of the following organ dysfunction criteria present within 6 hours of suspected infection and SIRS criteria that are NOT considered to be chronic conditions?         Organ dysfunction          Date of presentation of severe sepsis          Time of presentation of severe sepsis          Tissue hypoperfusion persists in the hour after crystalloid fluid administration, evidenced, by either:          Was hypotension present within one hour of the conclusion of crystalloid fluid administration?         Date of presentation of septic shock          Time of presentation of septic shock            User Key  (r) = Recorded By, (t) = Taken By, (c) = Cosigned By    [address] Name Provider Type    [address] [place] [address],  Physician                  MDM  Number of Diagnoses or Management Options  Acute renal failure [facility]):    Anemia:   Hyperglycemia:   Hyponatremia:   Diagnosis management comments: 49 yo M recently in [facility] and hospitalized for N/V/D, found to have anemia, acute renal failure, hyperglycemia- admitted to ICU for further managemnet       Amount and/or Complexity of Data Reviewed  Clinical lab tests: ordered and reviewed  Tests in the radiology section of CPT®: ordered and reviewed  Tests in the medicine section of CPT®: ordered and reviewed  Obtain history from someone other than the patient: yes (Brother)        Disposition  Final diagnoses:   Anemia   Hyponatremia   Hyperglycemia   Acute renal failure ([facility] )     Time reflects when diagnosis was documented in both MDM as applicable and the Disposition within this note     Time User Action Codes Description Comment    2/2/2019  3:14 PM Zulma Lunch Add [D64 9] Anemia     2/2/2019  3:14 PM Clydia Rafaela A Add [E87 1] Hyponatremia     2/2/2019  3:14 PM Clydia Rafaela A Add [R73 9] Hyperglycemia     2/2/2019  3:14 PM Clydia Rafaela A Add [N17 9] Acute renal failure ([facility] )     2/2/2019  3:27 PM Ketan Caballero Add [N17 9] Acute kidney failure ([facility] )     2/3/2019  7:04 AM Jarred Barnes Add [E11 65,  Z79 4] Type 2 diabetes mellitus with hyperglycemia, with long-term current use of insulin [facility])       ED Disposition     ED Disposition Condition Date/Time Comment    Admit  Sat Feb 2, 2019  3:14 PM Case was discussed with ICU and the patient's admission status was agreed to be Admission Status: inpatient status to the service of Dr Lena Jordan   Follow-up Information    None         Current Discharge Medication List      CONTINUE these medications which have NOT CHANGED    Details   insulin glargine (BASAGLAR KWIKPEN) 100 units/mL injection pen Inject 25 Units under the skin daily at bedtime  Qty: 5 pen, Refills: 2    Associated Diagnoses: Type 2 diabetes mellitus with hyperglycemia, with long-term current use of insulin ([facility])      lisinopril-hydrochlorothiazide (PRINZIDE,ZESTORETIC) 20-12 5 MG per tablet TAKE ONE TABLET BY MOUTH EVERY DAY  Qty: 30 tablet, Refills: 0    Associated Diagnoses: Essential hypertension      metFORMIN (GLUCOPHAGE) 500 mg tablet TAKE ONE TABLET BY MOUTH 2 TIMES A DAY WITH MEALS  Qty: 60 tablet, Refills: 0    Associated Diagnoses: Type 2 diabetes mellitus with hyperglycemia, with long-term current use of insulin ([facility])      naproxen (NAPROSYN) 500 mg tablet Take 1 tablet by mouth 2 (two) times a day with meals  Qty: 20 tablet, Refills: 0      UNIFINE PENTIPS 32G X 4 MM MISC TO BE USED WITH BASAGLAR  Qty: 100 each, Refills: 0    Associated Diagnoses: Type 2 diabetes mellitus without complication, with long-term current use of insulin ([facility])           No discharge procedures on file      ED Provider  Electronically Signed by           Ulices Carrasco DO  02/06/19 4072

## 2019-02-02 NOTE — ED NOTES
Dr Melva Goodman at pts bedside to discuss blood transfusion for the second time        Elise Angela, RN  02/02/19 5539

## 2019-02-02 NOTE — H&P
History & Physical Exam - [address] [place] 50 y o  male MRN: [id number]  Unit/Bed#: ICU 03 Encounter: [id number]      Assessment/Plan:  1  Acute kidney failure  · Consult Nephrology  · Hold nephrotoxin drugs  · Continue IV fluids  · Correct hyperglycemia  2  Hyperglycemia blood glucose 853  · Initiate DKA protocol  · IV insulin  · IV fluid  · Monitor blood glucose q 1 hour  · Monitor electrolytes and replete as needed  3  Alcohol abuse  · Family reports daily excessive drinking mostly rum  · Monitor for withdrawal  4  Recent gastritis  · Maintain NPO  5  Chronic back pain      Critical Care Time:   Documented critical care time excludes any procedures documented elsewhere  It also excludes any family updates    _____________________________________________________________________      HPI:    Renny Quigley is a 50 y o  male who resides in [facility] but was [address] in the [facility] for the past month  Two weeks ago the patient began to experience nausea, vomiting, diarrhea  He went to the hospital in the [facility]  He was found to have kidney failure  He was told he may need hemodialysis and he should return home  In addition, the patient has known diabetes mellitus type 2 but had not taken any insulin for month  He presented to [address] after flying in last night with complaints of nausea vomiting diarrhea, abdominal pain and lower back pain  Upon my arrival to the emergency department, the patient is stable  He is Malawian-speaking only  His brother is in the room and is translating  He denies complaints of headache or visual changes  He denies difficulty swallowing or managing his secretions  He denies shortness of breath, or rapid heartbeat  He denies difficulty with urination or defecation  He denies lower extremity edema    Given his significant kidney injury and hyperglycemia he will be admitted to the intensive care unit and will require greater than 2 midnight stay  Review of Systems:    Full 12 point review of systems was performed  Aside from what was mentioned in the HPI, it is otherwise negative  Historical Information   Past Medical History:   Diagnosis Date    Alcohol abuse     Chronic pancreatitis ([address] )     Diabetes mellitus (HCC)     Type 2    Hypertension     Pancreatitis     Paroxysmal A-fib (HCC)     Thrombocytopenia (HCC)      History reviewed  No pertinent surgical history  Social History   History   Alcohol Use    Yes     Comment: Drinks alot every day     History   Drug Use No     History   Smoking Status    Former Smoker   Smokeless Tobacco    Not on file       Family History:   Family History   Problem Relation Age of Onset    Diabetes Mother     Hypertension Mother     Hyperlipidemia Father        Medications:  Pertinent medications were reviewed    Current Facility-Administered Medications:  dextrose 5 % and sodium chloride 0 9 % 250 mL/hr Intravenous Continuous Glean Flock, CRNP    heparin (porcine) 5,000 Units Subcutaneous [facility] Glean Flock, CRNP    insulin regular (HumuLIN R,NovoLIN R) infusion 0 1-30 Units/hr Intravenous Continuous Glean Flock, CRNP Last Rate: 10 Units/hr (02/02/19 1626)   sodium chloride 2,000 mL/hr Intravenous Continuous Glean Flock, CRNP    Followed by        sodium chloride 500 mL/hr Intravenous Continuous Glean Flock, CRNP Last Rate: 500 mL/hr (02/02/19 1602)   Followed by        sodium chloride 250 mL/hr Intravenous Continuous Glean Flock, CRNP          No Known Allergies      Vitals:   /57   Pulse 80   Temp 98 2 °F (36 8 °C) (Temporal)   Resp 19   Wt 68 kg (150 lb)   SpO2 100%   There is no height or weight on file to calculate BMI    SpO2: 100 %,   SpO2 Activity: At Rest,   O2 Device: None (Room air)      Intake/Output Summary (Last 24 hours) at 02/02/19 1637  Last data filed at 02/02/19 1557   Gross per 24 hour   Intake          2003 25 ml   Output 1100 ml   Net           903 25 ml     Invasive Devices     Peripheral Intravenous Line            Peripheral IV 02/02/19 Left Wrist less than 1 day    Peripheral IV 02/02/19 Right Antecubital less than 1 day                Physical Exam:  Gen:  Cooperative  HEENT:  Atraumatic normocephalic pupils equal round reactive to light extraocular muscles intact sclerae anicteric oral mucosa is pink and moist  Neck:  Supple no JVD no lymphadenopathy  Chest:  Clear to auscultation bilaterally respirations even nonlabored  Cor:  Regular rate and rhythm no murmurs rubs or gallops appreciated  Abd:  Soft nontender with positive bowel sounds  Ext:  No clubbing cyanosis or edema  Neuro:  Alert and oriented x3 moves all extremities  Skin:  Warm dry and intact no rash      Diagnostic Data:  Lab: I have personally reviewed pertinent lab results  ,   CBC:    Results from last 7 days  Lab Units 02/02/19  1246   WBC Thousand/uL 8 93   HEMOGLOBIN g/dL 6 0*   HEMATOCRIT % 17 8*   PLATELETS Thousands/uL 404*      CMP: Lab Results   Component Value Date    SODIUM 122 (L) 02/02/2019    K 5 1 02/02/2019    CL 90 (L) 02/02/2019    CO2 18 (L) 02/02/2019    BUN 78 (H) 02/02/2019    CREATININE 7 65 (H) 02/02/2019    CALCIUM 7 1 (L) 02/02/2019    AST 18 02/02/2019    ALT 37 02/02/2019    ALKPHOS 74 02/02/2019    EGFR 8 02/02/2019   ,   PT/INR:   Lab Results   Component Value Date    INR 1 01 02/02/2019   ,   Magnesium: No components found for: MAG,  Phosphorous: No results found for: PHOS    ABG: No results found for: PHART, DPS6EQW, PO2ART, UKL6TXA, S0KSVSAZ, BEART, SOURCE,     Microbiology:  Blood cultures pending    Imaging: I have personally reviewed the pertinent imaging studies on the PACS system  CT scan of the abdomen shows questionable hydronephrosis    Abdominal ultrasound pending  CT scan of the head shows no acute intracranial abnormality  Chest x-ray shows no acute cardiopulmonary disease      Cardiac/EKG/telemetry/Echo:     Normal sinus rhythm      VTE Prophylaxis: Sequential compression device Alcira Kunz     Code Status: Level 1 - Full Code    ARIANE Lebron    Portions of the record may have been created with voice recognition software  Occasional wrong word or "sound a like" substitutions may have occurred due to the inherent limitations of voice recognition software  Read the chart carefully and recognize, using context, where substitutions have occurred

## 2019-02-02 NOTE — CONSULTS
Consultation - Nephrology   [facility] 50 y o  male MRN: [id number]  Unit/Bed#: ED 20 Encounter: [id number]    ASSESSMENT and PLAN:  1  Acute kidney injury (POA):  In the setting of hyperglycemia with blood sugar of 856, volume depletion with nausea vomiting and diarrhea and acute anemia, along with questionable hydronephrosis seen on CT scan without contrast  -home medication list has been reviewed and reports not taking lisinopril HCTZ for one month  do not restart  -denies recent NSAID use  -UA with micro revealed glucose greater than 1000, lysed blood, negative proteinuria, no RBCs  -will check beta hydroxybutyrate to R/O DKA vs HHNK on current specimen  -will check renal ultrasound to further evaluate hydronephrosis  -avoid hypotension  -avoid nephrotoxins  -will continue to continue to trend I/O, lab values in volume status  -patient status post IV normal saline x2 L, and is for 1 units of packed cells for decreased hemoglobin  -will trend closely   -recommend frequent BMP per CCM  -No need for urgent dialysis at this time    2  Hypertension:  BP currently acceptable  -trend for now  -avoid hypotension to prevent decreased renal perfusion    3  Mild hyperkalemia:  Likely secondary to acute kidney injury  -will monitor with IV fluids and blood products  -avoid potassium containing supplements    4  Metabolic acidosis:  Likely secondary to acute kidney injury  -will trend for now  -may need to change IV fluids to bicarb drip    5  Pseudohyponatremia:  Sodium 122 however blood sugars 856 and corrects to 140  -will trend with next blood work    6  Suspect Chronic Kidney Disease II:  With history of diabetes and hypertension  -last EGFR on 04/11/2018 was greater than 60      HISTORY OF PRESENT ILLNESS:  Requesting Physician: Víctor Farley DO  Reason for Consult:  ZENAIDA    [facility] is a 50 y o  male with a past medical history of [place]ian-speaking only, diabetes, hypertension, alcohol abuse, paroxysmal atrial fibrillation, and pancreatitis who presented to the emergency room with varying complaints  Per family, he has lived [place] [facility] [place] the [place] and followed with [facility] for his diabetic needs  Per report he was visiting the [facility] over the last month and left all medications at home to include is insulin  While in [facility] he was hospitalized about two weeks ago with nausea, vomiting, and diarrhea; was given IV fluids and blood sugar was treated  During his hospitalization he was told that he would need dialysis for he has kidney issues  The patient unsure of his renal function  In the ER blood sugar was over 800, and creatinine was 7 65, with potassium 5 1  Sodium at 1:22 a m  Corrects to 140 with elevated blood sugar  He was treated with 2 L IV fluids and an insulin drip  Patient making urine and actually had 1 L within the urinal   A renal consultation is requested today for assistance in the management of ZENAIDA  On discussion with the patient and his family  He is no longer having any nausea vomiting or diarrhea  He denies chest pain, shortness of breath, dizziness, lightheadedness, urinary issues, fevers or chills  He denies any hematuria, known foaming of the urine, difficulty making urine, or dysuria  PAST MEDICAL HISTORY:  Past Medical History:   Diagnosis Date    Alcohol abuse     Chronic pancreatitis ([facility] )     Diabetes mellitus ([facility] )     Type 2    Hypertension     Pancreatitis     Paroxysmal A-fib (HCC)     Thrombocytopenia (HCC)        PAST SURGICAL HISTORY:  History reviewed  No pertinent surgical history      ALLERGIES:  No Known Allergies    SOCIAL HISTORY:  History   Alcohol Use    Yes     Comment: Drinks alot every day     History   Drug Use No     History   Smoking Status    Former Smoker   Smokeless Tobacco    Not on file       FAMILY HISTORY:  Family History   Problem Relation Age of Onset    Diabetes Mother     Hypertension Mother  Hyperlipidemia Father        MEDICATIONS:    Current Facility-Administered Medications:     dextrose 5 % and sodium chloride 0 9 % infusion, 250 mL/hr, Intravenous, Continuous, ARIANE Terrazas    heparin (porcine) subcutaneous injection 5,000 Units, 5,000 Units, Subcutaneous, Q[address] **AND** Platelet count, , , Once, ARIANE Terrazas    insulin regular (HumuLIN R,NovoLIN R) 1 Units/mL in sodium chloride 0 9 % 100 mL infusion, 0 1-30 Units/hr, Intravenous, Continuous, ARIANE Terrazas, Last Rate: 5 mL/hr at 02/02/19 1546, 5 Units/hr at 02/02/19 1546    sodium chloride 0 9 % infusion, 2,000 mL/hr, Intravenous, Continuous **FOLLOWED BY** sodium chloride 0 9 % infusion, 500 mL/hr, Intravenous, Continuous, Last Rate: 500 mL/hr at 02/02/19 1602, 500 mL/hr at 02/02/19 1602 **FOLLOWED BY** sodium chloride 0 9 % infusion, 250 mL/hr, Intravenous, Continuous, ARIANE Terrazas    Current Outpatient Prescriptions:     insulin glargine (BASAGLAR KWIKPEN) 100 units/mL injection pen, Inject 25 Units under the skin daily at bedtime, Disp: 5 pen, Rfl: 2    lisinopril-hydrochlorothiazide (PRINZIDE,ZESTORETIC) 20-12 5 MG per tablet, TAKE ONE TABLET BY MOUTH EVERY DAY, Disp: 30 tablet, Rfl: 0    metFORMIN (GLUCOPHAGE) 500 mg tablet, TAKE ONE TABLET BY MOUTH 2 TIMES A DAY WITH MEALS, Disp: 60 tablet, Rfl: 0    naproxen (NAPROSYN) 500 mg tablet, Take 1 tablet by mouth 2 (two) times a day with meals, Disp: 20 tablet, Rfl: 0    UNIFINE PENTIPS 32G X 4 MM MISC, TO BE USED WITH BASAGLAR, Disp: 100 each, Rfl: 0    REVIEW OF SYSTEMS:  All the systems were reviewed and were negative except as documented on the HPI      PHYSICAL EXAM:  Current Weight: Weight - Scale: 68 kg (150 lb)  First Weight: Weight - Scale: 68 kg (150 lb)  Vitals:    02/02/19 1515 02/02/19 1530 02/02/19 1545 02/02/19 1600   BP:  135/64  111/57   BP Location:       Pulse: 78 78 76 80   Resp: 16 15 19 19   Temp:       TempSrc:       SpO2: 100% 100% 100% 100%   Weight:           Intake/Output Summary (Last 24 hours) at 02/02/19 1603  Last data filed at 02/02/19 1557   Gross per 24 hour   Intake          2003 25 ml   Output             1100 ml   Net           903 25 ml     General: conscious, coherent, cooperative, not in acute distress  Skin: no rash warm and dry  Eyes: pale conjunctivae, anicteric sclerae  ENT:  Dry lips and mucous membranes  Neck: supple, no JVD appreciated  Chest: clear breath sounds without crackles, wheezes, rhonchi  CVS: distinct S1 & S2, normal rate, regular rhythm  Abdomen: soft, non-tender, non-distended, normoactive bowel sounds  Extremities: no edema of both legs  Neuro: awake, alert, alert  Psych: appropriate affect       Lab Results:     Results from last 7 days  Lab Units 02/02/19  1246   WBC Thousand/uL 8 93   HEMOGLOBIN g/dL 6 0*   HEMATOCRIT % 17 8*   PLATELETS Thousands/uL 404*   POTASSIUM mmol/L 5 1   CHLORIDE mmol/L 90*   CO2 mmol/L 18*   BUN mg/dL 78*   CREATININE mg/dL 7 65*   CALCIUM mg/dL 7 1*   ALK PHOS U/L 74   ALT U/L 37   AST U/L 18       Other Studies:

## 2019-02-03 PROBLEM — E87.20 METABOLIC ACIDOSIS: Status: RESOLVED | Noted: 2019-02-03 | Resolved: 2019-02-03

## 2019-02-03 PROBLEM — E87.20 METABOLIC ACIDOSIS: Status: ACTIVE | Noted: 2019-02-03

## 2019-02-03 PROBLEM — N18.9 ANEMIA DUE TO CHRONIC KIDNEY DISEASE: Status: ACTIVE | Noted: 2019-02-03

## 2019-02-03 PROBLEM — D63.1 ANEMIA DUE TO CHRONIC KIDNEY DISEASE: Status: ACTIVE | Noted: 2019-02-03

## 2019-02-03 PROBLEM — E87.2 METABOLIC ACIDOSIS: Status: RESOLVED | Noted: 2019-02-03 | Resolved: 2019-02-03

## 2019-02-03 PROBLEM — E87.2 METABOLIC ACIDOSIS: Status: ACTIVE | Noted: 2019-02-03

## 2019-02-03 LAB
ABO GROUP BLD BPU: NORMAL
ANION GAP SERPL CALCULATED.3IONS-SCNC: 12 MMOL/L (ref 4–13)
ANION GAP SERPL CALCULATED.3IONS-SCNC: 12 MMOL/L (ref 4–13)
BLD SMEAR INTERP: NORMAL
BPU ID: NORMAL
BUN SERPL-MCNC: 57 MG/DL (ref 5–25)
BUN SERPL-MCNC: 61 MG/DL (ref 5–25)
CALCIUM SERPL-MCNC: 6.7 MG/DL (ref 8.3–10.1)
CALCIUM SERPL-MCNC: 7.3 MG/DL (ref 8.3–10.1)
CHLORIDE SERPL-SCNC: 106 MMOL/L (ref 100–108)
CHLORIDE SERPL-SCNC: 110 MMOL/L (ref 100–108)
CK SERPL-CCNC: 22 U/L (ref 39–308)
CO2 SERPL-SCNC: 18 MMOL/L (ref 21–32)
CO2 SERPL-SCNC: 20 MMOL/L (ref 21–32)
CREAT SERPL-MCNC: 5.04 MG/DL (ref 0.6–1.3)
CREAT SERPL-MCNC: 5.51 MG/DL (ref 0.6–1.3)
CREAT UR-MCNC: 28.2 MG/DL
CROSSMATCH: NORMAL
ERYTHROCYTE [DISTWIDTH] IN BLOOD BY AUTOMATED COUNT: 12.6 % (ref 11.6–15.1)
FOLATE SERPL-MCNC: 14.5 NG/ML (ref 3.1–17.5)
GFR SERPL CREATININE-BSD FRML MDRD: 11 ML/MIN/1.73SQ M
GFR SERPL CREATININE-BSD FRML MDRD: 13 ML/MIN/1.73SQ M
GLUCOSE SERPL-MCNC: 114 MG/DL (ref 65–140)
GLUCOSE SERPL-MCNC: 135 MG/DL (ref 65–140)
GLUCOSE SERPL-MCNC: 178 MG/DL (ref 65–140)
GLUCOSE SERPL-MCNC: 185 MG/DL (ref 65–140)
GLUCOSE SERPL-MCNC: 252 MG/DL (ref 65–140)
GLUCOSE SERPL-MCNC: 301 MG/DL (ref 65–140)
GLUCOSE SERPL-MCNC: 305 MG/DL (ref 65–140)
GLUCOSE SERPL-MCNC: 314 MG/DL (ref 65–140)
GLUCOSE SERPL-MCNC: 37 MG/DL (ref 65–140)
GLUCOSE SERPL-MCNC: 37 MG/DL (ref 65–140)
GLUCOSE SERPL-MCNC: 40 MG/DL (ref 65–140)
HCT VFR BLD AUTO: 21.7 % (ref 36.5–49.3)
HGB BLD-MCNC: 7.5 G/DL (ref 12–17)
LDH SERPL-CCNC: 319 U/L (ref 81–234)
MAGNESIUM SERPL-MCNC: 3.3 MG/DL (ref 1.6–2.6)
MCH RBC QN AUTO: 33.5 PG (ref 26.8–34.3)
MCHC RBC AUTO-ENTMCNC: 34.6 G/DL (ref 31.4–37.4)
MCV RBC AUTO: 97 FL (ref 82–98)
PLATELET # BLD AUTO: 448 THOUSANDS/UL (ref 149–390)
PMV BLD AUTO: 9.8 FL (ref 8.9–12.7)
POTASSIUM SERPL-SCNC: 3.6 MMOL/L (ref 3.5–5.3)
POTASSIUM SERPL-SCNC: 4.6 MMOL/L (ref 3.5–5.3)
RBC # BLD AUTO: 2.24 MILLION/UL (ref 3.88–5.62)
RETICS # AUTO: ABNORMAL 10*3/UL (ref 14356–105094)
RETICS # CALC: 1.88 % (ref 0.37–1.87)
SODIUM 24H UR-SCNC: 88 MOL/L
SODIUM SERPL-SCNC: 136 MMOL/L (ref 136–145)
SODIUM SERPL-SCNC: 142 MMOL/L (ref 136–145)
UNIT DISPENSE STATUS: NORMAL
UNIT PRODUCT CODE: NORMAL
UNIT RH: NORMAL
VIT B12 SERPL-MCNC: 1010 PG/ML (ref 100–900)
WBC # BLD AUTO: 16.49 THOUSAND/UL (ref 4.31–10.16)

## 2019-02-03 PROCEDURE — 82948 REAGENT STRIP/BLOOD GLUCOSE: CPT

## 2019-02-03 PROCEDURE — 99233 SBSQ HOSP IP/OBS HIGH 50: CPT | Performed by: NURSE PRACTITIONER

## 2019-02-03 PROCEDURE — 80048 BASIC METABOLIC PNL TOTAL CA: CPT | Performed by: NURSE PRACTITIONER

## 2019-02-03 PROCEDURE — 82570 ASSAY OF URINE CREATININE: CPT | Performed by: NURSE PRACTITIONER

## 2019-02-03 PROCEDURE — 85027 COMPLETE CBC AUTOMATED: CPT | Performed by: NURSE PRACTITIONER

## 2019-02-03 PROCEDURE — 99232 SBSQ HOSP IP/OBS MODERATE 35: CPT | Performed by: INTERNAL MEDICINE

## 2019-02-03 PROCEDURE — 83735 ASSAY OF MAGNESIUM: CPT | Performed by: NURSE PRACTITIONER

## 2019-02-03 PROCEDURE — 84300 ASSAY OF URINE SODIUM: CPT | Performed by: NURSE PRACTITIONER

## 2019-02-03 RX ORDER — SODIUM CHLORIDE 9 MG/ML
100 INJECTION, SOLUTION INTRAVENOUS CONTINUOUS
Status: DISCONTINUED | OUTPATIENT
Start: 2019-02-03 | End: 2019-02-08

## 2019-02-03 RX ORDER — INSULIN GLARGINE 100 [IU]/ML
15 INJECTION, SOLUTION SUBCUTANEOUS
Status: DISCONTINUED | OUTPATIENT
Start: 2019-02-03 | End: 2019-02-03

## 2019-02-03 RX ORDER — POTASSIUM CHLORIDE 20 MEQ/1
40 TABLET, EXTENDED RELEASE ORAL ONCE
Status: COMPLETED | OUTPATIENT
Start: 2019-02-03 | End: 2019-02-03

## 2019-02-03 RX ORDER — INSULIN GLARGINE 100 [IU]/ML
20 INJECTION, SOLUTION SUBCUTANEOUS
Status: DISCONTINUED | OUTPATIENT
Start: 2019-02-03 | End: 2019-02-04

## 2019-02-03 RX ORDER — INSULIN GLARGINE 100 [IU]/ML
15 INJECTION, SOLUTION SUBCUTANEOUS ONCE
Status: COMPLETED | OUTPATIENT
Start: 2019-02-03 | End: 2019-02-03

## 2019-02-03 RX ADMIN — SODIUM CHLORIDE 100 ML/HR: 0.9 INJECTION, SOLUTION INTRAVENOUS at 07:40

## 2019-02-03 RX ADMIN — HEPARIN SODIUM 5000 UNITS: 5000 INJECTION INTRAVENOUS; SUBCUTANEOUS at 21:47

## 2019-02-03 RX ADMIN — HEPARIN SODIUM 5000 UNITS: 5000 INJECTION INTRAVENOUS; SUBCUTANEOUS at 14:15

## 2019-02-03 RX ADMIN — INSULIN GLARGINE 15 UNITS: 100 INJECTION, SOLUTION SUBCUTANEOUS at 07:39

## 2019-02-03 RX ADMIN — DEXTROSE AND SODIUM CHLORIDE 250 ML/HR: 5; .9 INJECTION, SOLUTION INTRAVENOUS at 01:27

## 2019-02-03 RX ADMIN — MAGNESIUM SULFATE HEPTAHYDRATE 2 G: 40 INJECTION, SOLUTION INTRAVENOUS at 00:49

## 2019-02-03 RX ADMIN — HEPARIN SODIUM 5000 UNITS: 5000 INJECTION INTRAVENOUS; SUBCUTANEOUS at 00:29

## 2019-02-03 RX ADMIN — SODIUM CHLORIDE 100 ML/HR: 0.9 INJECTION, SOLUTION INTRAVENOUS at 16:30

## 2019-02-03 RX ADMIN — INSULIN GLARGINE 20 UNITS: 100 INJECTION, SOLUTION SUBCUTANEOUS at 21:47

## 2019-02-03 RX ADMIN — SODIUM CHLORIDE 8 UNITS/HR: 9 INJECTION, SOLUTION INTRAVENOUS at 01:44

## 2019-02-03 RX ADMIN — INSULIN LISPRO 3 UNITS: 100 INJECTION, SOLUTION INTRAVENOUS; SUBCUTANEOUS at 12:38

## 2019-02-03 RX ADMIN — POTASSIUM CHLORIDE 40 MEQ: 1500 TABLET, EXTENDED RELEASE ORAL at 08:57

## 2019-02-03 RX ADMIN — INSULIN LISPRO 2 UNITS: 100 INJECTION, SOLUTION INTRAVENOUS; SUBCUTANEOUS at 08:57

## 2019-02-03 RX ADMIN — INSULIN LISPRO 1 UNITS: 100 INJECTION, SOLUTION INTRAVENOUS; SUBCUTANEOUS at 21:48

## 2019-02-03 RX ADMIN — HEPARIN SODIUM 5000 UNITS: 5000 INJECTION INTRAVENOUS; SUBCUTANEOUS at 05:51

## 2019-02-03 NOTE — PROGRESS NOTES
NEPHROLOGY PROGRESS NOTE   Hugo Rowley 50 y o  male MRN: [id number]  Unit/Bed#: ICU 03 Encounter: [id number]  Reason for Consult:  Acute kidney injury    ASSESSMENT/PLAN:  1  Acute kidney injury (POA):   prerenal versus ATN in the setting of hyperglycemia, volume depletion with nausea, vomiting and diarrhea and acute anemia, along with questionable hydronephrosis seen on CT scan without contrast  -peak creatinine on admission was 7 65, improving overnight and now 5 04  -lisinopril HCTZ remains discontinued  -continues with normal saline at 100 mL/hr  -UA with micro revealed glucose greater than 1000, lysed blood, negative proteinuria, no RBCs  -CPK 22  -will check renal ultrasound to further evaluate hydronephrosis-pending (for CT scan abdomen revealed a right calyceal dilatation and questionable hydronephrosis)  -avoid hypotension  -avoid nephrotoxins  -will continue to continue to trend I/O, lab values in volume status  -status post IV fluids and 1 unit packed cells  -will trend closely   -No need for urgent dialysis at this time    2  HHNK versus DKA: -beta hydroxybutyrate-normal at 0 16  -negative ketones in urine, + glucosuria  -continue to follow up cultures to rule out infections  -UA with 2-4 WBCs  -chest x-ray did not reveal any acute abnormalities  -continue to manage per primary team     3  Hypertension:  BP remains acceptable  -trend for now  -avoid hypotension to prevent decreased renal perfusion     4  Mild hyperkalemia:  resolved and 3 6  - Likely secondary to acute kidney injury  -continue trend     5  Anion gap acidosis/lactic acidosis:  Likely secondary to acute kidney injury  -improving slowly and now 20     6  Hyponatremia:   likely pseudohyponatremia with grossly elevated blood sugar  - resolved with blood sugar management  -sodium 142 with blood sugar 40 this morning     7   Suspect Chronic Kidney Disease II:  With history of diabetes and hypertension  -last EGFR on 04/11/2018 was greater than 60    8  Mild hyperkalemia:  Likely secondary to acute kidney injury  -improved at 3 6  -continue to trend    9  Acute anemia:  Status post 1 unit packed cells  -hemoglobin increased to the 7 5 from 6 0  -haptoglobin pending  -hemolysis smear-did not reveal any schistocytes or Homans cells      SUBJECTIVE:  Patient seen and examined  Refused to use the blue translation phone secondary to unable to hear well    Was able to use minimal [place] and he deny chest pain or shortness of Breath he is feeling better    OBJECTIVE:  Current Weight: Weight - Scale: 66 kg (145 lb 8 1 oz)  Vitals:    02/03/19 0530 02/03/19 0541 02/03/19 0630 02/03/19 0700   BP: 110/52  128/61    Pulse: 78  74    Resp: 13  12    Temp:    98 2 °F (36 8 °C)   TempSrc:    Temporal   SpO2: 99%  100%    Weight:  66 kg (145 lb 8 1 oz)         Intake/Output Summary (Last 24 hours) at 02/03/19 0850  Last data filed at 02/03/19 0740   Gross per 24 hour   Intake          5275 95 ml   Output             3225 ml   Net          2050 95 ml     General:  No acute distress, out of bed, cooperative  Skin:  Warm and dry without rash  HEENT:  Mucous membranes moist without exudate, sclera anicteric  Neck:  Supple without JVD  Chest:  Clear on auscultation without any crackles, wheezes, rhonchi noted  Heart:  Rate and rhythm without rub  Abdomen:  Soft, nontender, nondistended, normoactive bowel sounds  Extremities:  No edema noted bilaterally  Neuro:  Alert oriented and awake  Psych:  Appropriate affect    Medications:    Current Facility-Administered Medications:     heparin (porcine) subcutaneous injection 5,000 Units, 5,000 Units, Subcutaneous, Q8H [address], 5,000 Units at 02/03/19 0551 **AND** Platelet count, , , Once, ARIANE Moss    insulin glargine (LANTUS) subcutaneous injection 20 Units 0 2 mL, 20 Units, Subcutaneous, HS, ARIANE Lopez    insulin lispro (HumaLOG) 100 units/mL subcutaneous injection 1-5 Units, 1-5 Units, Subcutaneous, TID AC **AND** Fingerstick Glucose (POCT), , , TID AC, Jessica ARIANE Dye    insulin lispro (HumaLOG) 100 units/mL subcutaneous injection 1-5 Units, 1-5 Units, Subcutaneous, HS, Jessica ARIANE Dye    potassium chloride (K-DUR,KLOR-CON) CR tablet 40 mEq, 40 mEq, Oral, Once, ARIANE Crook    sodium chloride 0 9 % infusion, 100 mL/hr, Intravenous, Continuous, Meryle Jacquet Bilofsky, CRNP, Last Rate: 100 mL/hr at 02/03/19 0740, 100 mL/hr at 02/03/19 0740    Laboratory Results:    Results from last 7 days  Lab Units 02/03/19  0515 02/02/19  2355 02/02/19  1723 02/02/19  1246   WBC Thousand/uL 16 49*  --   --  8 93   HEMOGLOBIN g/dL 7 5*  --   --  6 0*   HEMATOCRIT % 21 7*  --   --  17 8*   PLATELETS Thousands/uL 448*  --   --  404*   POTASSIUM mmol/L 3 6 4 6 3 5 5 1   CHLORIDE mmol/L 110* 106 103 90*   CO2 mmol/L 20* 18* 19* 18*   BUN mg/dL 57* 61* 69* 78*   CREATININE mg/dL 5 04* 5 51* 6 58* 7 65*   CALCIUM mg/dL 7 3* 6 7* 7 2* 7 1*   MAGNESIUM mg/dL 3 3*  --  1 3*  --    PHOSPHORUS mg/dL  --   --  4 5  --

## 2019-02-03 NOTE — PROGRESS NOTES
Called report and spoke with RN Claudetta Lyme approximately at [phone]  Pt left the unit via wheelchair accompanied by PCA and his family with all his belongings  Pt's medication given to assigned RN

## 2019-02-03 NOTE — PROGRESS NOTES
Progress Note - Critical Care   Wes Araujo 50 y o  male MRN: [id number]  Unit/Bed#: ICU 03 Encounter: [id number]    Assessment/Plan:  1  Hyperglycemia due to DKA vs HHNK   · Was given aggressive fluid hydration overnight with improvement of acidosis and anion gap  · Is non-compliant on previous doctor's notes   · Switch to non-critical care insulin infusion until acidosis improved then will change to Lantus and SSI coverage  · Check hemoglobin a1c  · Episodes of hypoglycemia requiring dextrose or PO diet  · May need endocrinology consult  2  Anion gap metabolic acidosis suspect due to DKA  · Gap closed but remains with acidosis   3  Acute kidney injury, suspect on chronic kidney disease  · Creatinine 7 6 on admission- trending down  · Unknown baseline creatinine last lab value in 2016 which was normal  4  Alcohol abuse  · Family reports drinking rum daily  · Monitor for w/d symptoms  · Hold on librium for now  5  Recent gastritis  · Hemoglobin dropped to 6 and received 1 unit PRBC  · No signs of active bleeding  6  Anemia, likely chronic disease  · Transfused 1 unit PRBC  · Check occult blood  · Transfuse less than 7    _____________________________________________________________________    HPI/24hr events:   Anion gap closed overnight switched off of DKA insulin infusion to non-critical care insulin drip  Had episode of hypoglycemia    Patient has no complaints    Medications:    Current Facility-Administered Medications:  dextrose 5 % and sodium chloride 0 9 % 125 mL/hr Intravenous Continuous ARIANE Schaefer Last Rate: 125 mL/hr (02/03/19 0142)   heparin (porcine) 5,000 Units Subcutaneous [facility] ARIANE Gan    insulin regular (HumuLIN R,NovoLIN R) infusion 0 3-21 Units/hr Intravenous Titrated ARIANE Schaefer Last Rate: Stopped (02/03/19 0525)         dextrose 5 % and sodium chloride 0 9 % 125 mL/hr Last Rate: 125 mL/hr (02/03/19 0142)   insulin regular (HumuLIN R,NovoLIN R) infusion 0 3-21 Units/hr Last Rate: Stopped (02/03/19 0525)         Physical exam:  Vitals: There is no height or weight on file to calculate BMI  Blood pressure 108/57, pulse 76, temperature 99 3 °F (37 4 °C), temperature source Temporal, resp  rate 15, weight 66 kg (145 lb 8 1 oz), SpO2 99 %  ,  Temp  Min: 97 6 °F (36 4 °C)  Max: 100 °F (37 8 °C)  IBW: -88 kg    SpO2: 99 %  SpO2 Activity: At Rest  O2 Device: None (Room air)      Intake/Output Summary (Last 24 hours) at 02/03/19 0558  Last data filed at 02/03/19 0201   Gross per 24 hour   Intake           4569 7 ml   Output             3225 ml   Net           1344 7 ml       Invasive/non-invasive ventilation settings:   Respiratory    Lab Data (Last 4 hours)    None         O2/Vent Data (Last 4 hours)    None              Invasive Devices     Peripheral Intravenous Line            Peripheral IV 02/02/19 Left Wrist less than 1 day    Peripheral IV 02/02/19 Right Antecubital less than 1 day                  Physical Exam:  Gen:  Alert and oriented, NAD  HEENT:  PERRL, EOMI, normocephalic, atraumatic, o/p clear  Neck:  Supple, no JVD, no adenopathy  Chest:  CTA  Cor:  RRR, no murmurs, rubs, or gallops  Abd:  Soft, non-tender, non-distended, bowel sounds present  Ext:  GARCIA, no edema  Neuro:  CN II-XII grossly intact  Skin:  Warm, dry, intact      Diagnostic Data:  Lab: I have personally reviewed pertinent lab results     CBC:     Results from last 7 days  Lab Units 02/03/19  0515 02/02/19  1246   WBC Thousand/uL 16 49* 8 93   HEMOGLOBIN g/dL 7 5* 6 0*   HEMATOCRIT % 21 7* 17 8*   PLATELETS Thousands/uL 448* 404*       CMP:     Results from last 7 days  Lab Units 02/03/19  0515 02/02/19  2355 02/02/19  1723 02/02/19  1246   SODIUM mmol/L 142 136 137 122*   POTASSIUM mmol/L 3 6 4 6 3 5 5 1   CHLORIDE mmol/L 110* 106 103 90*   CO2 mmol/L 20* 18* 19* 18*   BUN mg/dL 57* 61* 69* 78*   CREATININE mg/dL 5 04* 5 51* 6 58* 7 65*   CALCIUM mg/dL 7 3* 6 7* 7 2* 7 1*   ALK PHOS U/L  --   --   -- 74   ALT U/L  --   --   --  37   AST U/L  --   --   --  18     PT/INR:   Lab Results   Component Value Date    INR 1 01 02/02/2019   ,   Magnesium:     Results from last 7 days  Lab Units 02/02/19  1723   MAGNESIUM mg/dL 1 3*     Phosphorous:     Results from last 7 days  Lab Units 02/02/19  1723   PHOSPHORUS mg/dL 4 5       Microbiology:        Imaging:  No new imaging    Cardiac lab/EKG/telemetry/ECHO:   NSR    VTE Prophylaxis: Heparin    Code Status: Level 1 - Full Code    ARIANE Evans    Portions of the record may have been created with voice recognition software  Occasional wrong word or "sound a like" substitutions may have occurred due to the inherent limitations of voice recognition software  Read the chart carefully and recognize, using context, where substitutions have occurred

## 2019-02-03 NOTE — PROGRESS NOTES
Plan:  - Today's Plan of Care:  Rehab: Physical Therapy: [facility] - [phone]    -We also discussed other future treatment options:  Referral to Orthopedic surgeon, MRI of the shoulder or Steroid injection of shoulder    Follow Up: as needed    If you have any further questions for your physician or physician s care team you can call [phone] and use option 3 to leave a voice message. Calls received during business hours will be returned same day.      Patient to follow up with Primary Care provider regarding elevated blood pressure.       Progress Note - ICU Transfer to SD/MS tele   Renny Quigley 50 y o  male MRN: [id number]  [facility]   Unit/Bed#: ICU 03 Encounter: [id number]    Code Status: Level 1 - Full Code  POA:    POLST:      Reason for ICU admission: HHNK/Metabolic acidosis with lactic acidosis    Active problems:   Principal Problem:    ZENAIDA (acute kidney injury) ([facility] )  Active Problems:    Type 2 diabetes mellitus with hyperglycemia, with long-term current use of insulin ([facility] )    Alcohol abuse    Anemia due to chronic kidney disease  Resolved Problems:    Lactic acidosis    Metabolic acidosis      Consultants:   Nephrology  Endocrinology    History of Present Illness:  eRnny Quigley is a 50 y o  Male patient who was recently [address] in the [facility] for the past month  Approximately two weeks ago he began to experience nausea, vomiting, and diarrhea  He was seen in a hospital in the [facility], was found to have renal failure, told he may need hemodialysis, and should return home  He arrived home on the night of 2/1/2019 and then went the ED the next day with complaints of nausea, vomiting, diarrhea, abdominal pain, and lower back pain  He has a history of diabetes mellitus type 2 and had not been taking his insulin for one month  Family reports that the patient drink etoh excessively, unclear of amount  Summary of clinical course: This patient's blood glucose was found to be 853, he was placed on an insulin infusion and given IV fluid replacement, for suspected HHNK  He remained on an insulin drip till this am till his anion gap closed and [facility] scale insulin coverage and lantus 20 HS  His creatinine was initially 7 5 and improved to 5 with fluids  Nephrology was consulted  It was felt this was acute on chronic and not indicated for acute dialysis given his improvement   An endocrinology consult was placed today to assist with management  Recent or scheduled procedures:   CT abdomen/pelvis-minimal right caliceal dilation and questionable hydronephrosis  Renal Ultrasound pending    Outstanding/pending diagnostics:   n/a    Cultures:   Blood cultures pending       Mobilization Plan:   OOB to chair    Nutrition Plan:   CHO consistent diet    VTE Pharmacologic Prophylaxis: Heparin  VTE Mechanical Prophylaxis: sequential compression device    Discharge Plan:   Patient should be ready for discharge per primary team    Initial Physical Therapy Recommendations: na  Initial Occupational Therapy Recommendations: na  Initial /Plan: na    Home medications that are not reordered and reason why:   Lisinopril and HCTZ-ZENAIDA on CKD     Specific Diagnosis Plan:    Renal: Trend BUN/Creatinine   Monitor UOP   Hold Lisinopril and HCTZ   Avoid nephrotoxic agents   Nephrology on consult  Electrolytes:  BMP and magnesium level in AM  Hyperglycemia:  ACHS with SSI and lantus 20 HS    Spoke with Dr Lockwood Serve regarding transfer  Please call ICU with any questions or concerns  Portions of the record may have been created with voice recognition software  Occasional wrong word or "sound a like" substitutions may have occurred due to the inherent limitations of voice recognition software  Read the chart carefully and recognize, using context, where substitutions have occurred      ARIANE Long

## 2019-02-03 NOTE — UTILIZATION REVIEW
Network Utilization Review Department  Phone: [phone]; Fax [phone]  [email or web address]  ATTENTION: Please call with any questions or concerns to [phone]  and carefully listen to the prompts so that you are directed to the right person  Send all requests for admission clinical reviews, approved or denied determinations and any other requests to fax [phone]  All voicemails are confidential     Initial Clinical Review    Admission: Date/Time/Statement: inpatient  2/2/19 @ 1515    ICU  Orders Placed This Encounter   Procedures    Inpatient Admission (expected length of stay for this patient Order details is greater than two midnights)     Standing Status:   Standing     Number of Occurrences:   1     Order Specific Question:   Admitting Physician     Answer:   Scout Guevara     Order Specific Question:   Level of Care     Answer:   Critical Care [15]     Order Specific Question:   Estimated length of stay     Answer:   More than 2 Midnights     Order Specific Question:   Certification     Answer:   I certify that inpatient services are medically necessary for this patient for a duration of greater than two midnights  See H&P and MD Progress Notes for additional information about the patient's course of treatment  ED: Date/Time/Mode of Arrival:   ED Arrival Information     Expected Arrival Acuity Means of Arrival Escorted By Service Admission Type    - 2/2/2019 12:18 Emergent Walk-In Family Member Critical Care/ICU Emergency    Arrival Complaint    dizziness        Chief Complaint:   Chief Complaint   Patient presents with    Dizziness     dizziness, back pain, hx kidney issues  Arrived yesterday from , was hospitalized there for "kidney problems", states physician states he need to come to [address] [facility] for further treatment  occasional CP/SOB         History of Illness: Aubree Rangel is a 50 y o  male who resides in [facility] but was vacationing in the [facility] for the past month  Two weeks ago the patient began to experience nausea, vomiting, diarrhea  He went to the hospital in the [facility]  He was found to have kidney failure  He was told he may need hemodialysis and he should return home  In addition, the patient has known diabetes mellitus type 2 but had not taken any insulin for month    He presented to [facility] after flying in last night with complaints of nausea vomiting diarrhea, abdominal pain and lower back pain      ED Vital Signs:   ED Triage Vitals   Temperature Pulse Respirations Blood Pressure SpO2   02/02/19 1224 02/02/19 1224 02/02/19 1224 02/02/19 1224 02/02/19 1224   97 6 °F (36 4 °C) 87 20 135/63 100 %      Temp Source Heart Rate Source Patient Position - Orthostatic VS BP Location FiO2 (%)   02/02/19 1348 02/02/19 1348 02/02/19 1348 02/02/19 1348 --   Oral Monitor Lying Right arm       Pain Score       02/02/19 1224       Worst Possible Pain        Wt Readings from Last 1 Encounters:   02/03/19 66 kg (145 lb 8 1 oz)     Vital Signs (abnormal):   02/02/19 1600  100 °F (37 8 °C)  80  19  111/57  --  100 %     02/02/19 1716  100 °F (37 8 °C)  77  13  114/63        Pertinent Labs/Diagnostic Test Results:   02/02/19 1245     pH, Sami 7 300 - 7 400 7 316    pCO2, Sami 42 0 - 50 0 mm Hg 35 3     pO2, Sami 35 0 - 45 0 mm Hg 41 7    HCO3, Sami 24 - 30 mmol/L 17 6     Base Excess, Sami mmol/L -7 8    O2 Content, Sami ml/dL 7 2    O2 HGB, VENOUS 60 0 - 80 0 % 73 2       02/02/19 1246    Sodium 136 - 145 mmol/L 122     Potassium 3 5 - 5 3 mmol/L 5 1    Chloride 100 - 108 mmol/L 90     CO2 21 - 32 mmol/L 18     ANION GAP 4 - 13 mmol/L 14     BUN 5 - 25 mg/dL 78 ,  69   Creatinine 0 60 - 1 30 mg/dL 7 65 ,   6 58   Comment: Standardized to IDMS reference method   Glucose 65 - 140 mg/dL 856    Calcium 8 3 - 10 1 mg/dL 7 1     AST 5 - 45 U/L 18    ALT 12 - 78 U/L 37    Alkaline Phosphatase 46 - 116 U/L 74    Total Protein 6 4 - 8 2 g/dL 6 1     Albumin 3 5 - 5 0 g/dL 3 0     Total Bilirubin 0 20 - 1 00 mg/dL 0 47    eGFR ml/min/1 73sq m 8       02/02/19 1723    Magnesium 1 6 - 2 6 mg/dL 1 3         Lactic acid 2 5, 3 1  Serum osmo 326  hgb 6   hct 17 8   Plate 053     UA: >=6676 gluc    Tr bld   elev leuks   2-4 wbc   occ epithelials  Neg acetone (serum)  procalc   42     Gluc >500, 856, >500, >500, 448, 353, 115, 72, 36, 135, 94, 129, 157, 202, 295    CT a&P: Minimal right calyceal dilatation/questionable hydronephrosis  CT head: nothing acute  PCXR: nothing acute    ED Treatment:   Medication Administration from 02/02/2019 1218 to 02/02/2019 1614       Date/Time Order Dose Route Action     02/02/2019 1302 sodium chloride 0 9 % bolus 1,000 mL 1,000 mL Intravenous New Bag     02/02/2019 1400 sodium chloride 0 9 % bolus 1,000 mL 1,000 mL Intravenous New Bag     02/02/2019 1402 insulin regular (HumuLIN R,NovoLIN R) injection 10 Units 10 Units Intravenous Given     02/02/2019 1526 insulin regular (HumuLIN R,NovoLIN R) 1 Units/mL in sodium chloride 0 9 % 100 mL infusion 15 Units/hr Intravenous New Bag     02/02/2019 1602 sodium chloride 0 9 % infusion 500 mL/hr Intravenous New Bag     02/02/2019 1546 insulin regular (HumuLIN R,NovoLIN R) 1 Units/mL in sodium chloride 0 9 % 100 mL infusion 5 Units/hr Intravenous New Bag        Past Medical/Surgical History:    Active Ambulatory Problems     Diagnosis Date Noted    Hypertension     Type 2 diabetes mellitus with hyperglycemia, with long-term current use of insulin (HCC)     Alcohol abuse     Lactic acidosis 09/14/2016    Alcohol intoxication ([facility] ) 09/14/2016    Paroxysmal A-fib (HCC)     Chronic pancreatitis (HCC)     Thrombocytopenia (HCC)        Past Medical History:   Diagnosis Date    Alcohol abuse     Chronic pancreatitis ([facility] )     [facility]     Hypertension     Pancreatitis     Paroxysmal A-fib (HCC)     Thrombocytopenia (HCC)      Admitting Diagnosis: Dizziness [R42]  Hyponatremia [E87 1]  Acute kidney failure ([facility] ) [N17 9]  Anemia [D64 9]  Acute renal failure ([facility] ) [N17 9]  Hyperglycemia [R73 9]  Age/Sex: 50 y o  male  Assessment/Plan: 51 yo m to ED from home admitted to ICU as inpatient due to ZENAIDA, hyperglycemia  C/o n/v/d/and low back pain  Was on vacation in [place] [facility] [place] [facility]"" for a month-began with n/v/d  Seen in hospital there, told to return to the [facility] for treatment of acute renal failure  Given his significant kidney injury and hyperglycemia he will be admitted to the intensive care unit  Presented to the hospital shortly after arriving back in the area  1  Acute kidney failure  · Consult Nephrology  · Hold nephrotoxin drugs  · Continue IV fluids  · Correct hyperglycemia  2  Hyperglycemia blood glucose 853  · Initiate DKA protocol  · IV insulin  · IV fluid  · Monitor blood glucose q 1 hour  · Monitor electrolytes and replete as needed  3  Alcohol abuse  · Family reports daily excessive drinking mostly rum  · Monitor for withdrawal  4  Recent gastritis  · Maintain NPO  5   Chronic back pain  Admission Orders:  Scheduled Meds:   Current Facility-Administered Medications:  heparin (porcine) 5,000 Units Subcutaneous Q8H [facility]   insulin glargine 20 Units Subcutaneous HS   insulin lispro 1-5 Units Subcutaneous TID AC   insulin lispro 1-5 Units Subcutaneous HS   sodium chloride 100 mL/hr Intravenous Continuous   IV mg x 1 on 2/2, x 1 on 2/3  IV kcl x 2 on 2/2  D5NS 125/hr  Insulin gtt running through 2/3 @6871    Cons case mgmt  Cons nutrition  Cons endocrine  Cons renal  Diabetic diet  Bmp, mg, phos in am  Urine: random creat, random na     hemetest stools 1-3  Haptoglobin  Renal US  Dysphagia eval  Gluc checks q2h  I/O q2h  Neuro checks q4h  Daily ICU assessment  scd's  Daily wt    Per renal 32:  59-year-old male with a past medical history of diabetes, hypertension, alcohol abuse, AFib, pancreatitis who presents after returning for \[place] [facility] [place] [facility]\"" where the patient did not have any of his medications and was hospitalized 2 weeks ago with nausea and vomiting and diarrhea  It appears that at that time the patient was told that he would need dialysis?     In the ER the patient was found to have hyperglycemia, creatinine of 7 7, slight hyperkalemia  Patient was started on insulin drip and fluids     1) ZENAIDA-prior creatinine in 2016 was 1 8-1 2   Possible prerenal vs ATN vs other; less likely nephritic as no RBC in urine and neg protein   - CT abdomen pelvis with minimal right caliceal dilatation and questionable hydronephrosis  -check renal ultrasound  -I/O; avoid nephrotoxic agents  -BMP every 4 hours-please call if there are any significant metabolic disturbances  -check CPK  - BMP q 4 hours   2) HHNK vs DKA - trigger may be lack of medications when patient went to [place] vs other such as infectious   - glucosuria, neg ketone in urine  - check beta hydroxybutyrate   - rule out infections   - UA with 2-4 WBC  - f/u final blood cultures  - CXR no acute abn  - management per Primary Team     3) change in mental state   - hypoglycemia currently - being treated per Primary team- CT head no acute abn   4) hyponatremia -corrected sodium is approximately 138-140   5) acid/base   -lactic acidosis elevated  Anion gap elevated  Acute kidney injury  -monitor with insulin drip-which is being held for hypoglycemia currently, and volume expansion  -repeat BMP in 4 hours   6) potassium 5 1 -monitor closely with insulin drip  May develop hypokalemia   7) polyuria-most likely due to hyperglycemia   8) anemia    - hemoglobin 6 on admission, and earlier this year was 14  Platelets are stable   -check hemolysis panel  -check haptoglobin and LDH   - check retic  - check folate, B12 as macrocytic  - pt is coming from [place]  Risk of malaria; mosquito borne illnesses are also risk   But in diseases such as zika should also see thrombocytopenia

## 2019-02-04 ENCOUNTER — APPOINTMENT (INPATIENT)
Dept: ULTRASOUND IMAGING | Facility: HOSPITAL | Age: 49
DRG: 469 | End: 2019-02-04
Payer: COMMERCIAL

## 2019-02-04 PROBLEM — Z86.79 HISTORY OF ATRIAL FIBRILLATION: Status: ACTIVE | Noted: 2019-02-04

## 2019-02-04 PROBLEM — G92.8 TOXIC METABOLIC ENCEPHALOPATHY: Status: ACTIVE | Noted: 2019-02-04

## 2019-02-04 PROBLEM — E87.1 HYPONATREMIA: Status: ACTIVE | Noted: 2019-02-04

## 2019-02-04 LAB
ANION GAP SERPL CALCULATED.3IONS-SCNC: 11 MMOL/L (ref 4–13)
BASOPHILS # BLD AUTO: 0.07 THOUSANDS/ΜL (ref 0–0.1)
BASOPHILS NFR BLD AUTO: 1 % (ref 0–1)
BUN SERPL-MCNC: 38 MG/DL (ref 5–25)
CALCIUM SERPL-MCNC: 7.6 MG/DL (ref 8.3–10.1)
CHLORIDE SERPL-SCNC: 108 MMOL/L (ref 100–108)
CO2 SERPL-SCNC: 21 MMOL/L (ref 21–32)
CREAT SERPL-MCNC: 3.48 MG/DL (ref 0.6–1.3)
EOSINOPHIL # BLD AUTO: 0.36 THOUSAND/ΜL (ref 0–0.61)
EOSINOPHIL NFR BLD AUTO: 3 % (ref 0–6)
ERYTHROCYTE [DISTWIDTH] IN BLOOD BY AUTOMATED COUNT: 12.7 % (ref 11.6–15.1)
GFR SERPL CREATININE-BSD FRML MDRD: 20 ML/MIN/1.73SQ M
GLUCOSE SERPL-MCNC: 113 MG/DL (ref 65–140)
GLUCOSE SERPL-MCNC: 179 MG/DL (ref 65–140)
GLUCOSE SERPL-MCNC: 214 MG/DL (ref 65–140)
GLUCOSE SERPL-MCNC: 251 MG/DL (ref 65–140)
GLUCOSE SERPL-MCNC: 37 MG/DL (ref 65–140)
GLUCOSE SERPL-MCNC: 71 MG/DL (ref 65–140)
GLUCOSE SERPL-MCNC: 74 MG/DL (ref 65–140)
HCT VFR BLD AUTO: 20.4 % (ref 36.5–49.3)
HGB BLD-MCNC: 7 G/DL (ref 12–17)
IMM GRANULOCYTES # BLD AUTO: 0.14 THOUSAND/UL (ref 0–0.2)
IMM GRANULOCYTES NFR BLD AUTO: 1 % (ref 0–2)
LYMPHOCYTES # BLD AUTO: 2.13 THOUSANDS/ΜL (ref 0.6–4.47)
LYMPHOCYTES NFR BLD AUTO: 17 % (ref 14–44)
MAGNESIUM SERPL-MCNC: 1.7 MG/DL (ref 1.6–2.6)
MCH RBC QN AUTO: 33 PG (ref 26.8–34.3)
MCHC RBC AUTO-ENTMCNC: 34.3 G/DL (ref 31.4–37.4)
MCV RBC AUTO: 96 FL (ref 82–98)
MONOCYTES # BLD AUTO: 0.79 THOUSAND/ΜL (ref 0.17–1.22)
MONOCYTES NFR BLD AUTO: 6 % (ref 4–12)
NEUTROPHILS # BLD AUTO: 8.9 THOUSANDS/ΜL (ref 1.85–7.62)
NEUTS SEG NFR BLD AUTO: 72 % (ref 43–75)
NRBC BLD AUTO-RTO: 0 /100 WBCS
OSMOLALITY UR/SERPL-RTO: 292 MMOL/KG (ref 282–298)
PHOSPHATE SERPL-MCNC: 4.5 MG/DL (ref 2.7–4.5)
PLATELET # BLD AUTO: 401 THOUSANDS/UL (ref 149–390)
PMV BLD AUTO: 10.9 FL (ref 8.9–12.7)
POTASSIUM SERPL-SCNC: 3.6 MMOL/L (ref 3.5–5.3)
RBC # BLD AUTO: 2.12 MILLION/UL (ref 3.88–5.62)
SODIUM SERPL-SCNC: 140 MMOL/L (ref 136–145)
WBC # BLD AUTO: 12.39 THOUSAND/UL (ref 4.31–10.16)

## 2019-02-04 PROCEDURE — 85025 COMPLETE CBC W/AUTO DIFF WBC: CPT | Performed by: NURSE PRACTITIONER

## 2019-02-04 PROCEDURE — 76770 US EXAM ABDO BACK WALL COMP: CPT

## 2019-02-04 PROCEDURE — 99233 SBSQ HOSP IP/OBS HIGH 50: CPT | Performed by: INTERNAL MEDICINE

## 2019-02-04 PROCEDURE — 99254 IP/OBS CNSLTJ NEW/EST MOD 60: CPT | Performed by: INTERNAL MEDICINE

## 2019-02-04 PROCEDURE — 83735 ASSAY OF MAGNESIUM: CPT | Performed by: NURSE PRACTITIONER

## 2019-02-04 PROCEDURE — 80048 BASIC METABOLIC PNL TOTAL CA: CPT | Performed by: NURSE PRACTITIONER

## 2019-02-04 PROCEDURE — 84100 ASSAY OF PHOSPHORUS: CPT | Performed by: NURSE PRACTITIONER

## 2019-02-04 PROCEDURE — 83930 ASSAY OF BLOOD OSMOLALITY: CPT | Performed by: INTERNAL MEDICINE

## 2019-02-04 PROCEDURE — 99232 SBSQ HOSP IP/OBS MODERATE 35: CPT | Performed by: INTERNAL MEDICINE

## 2019-02-04 PROCEDURE — 82948 REAGENT STRIP/BLOOD GLUCOSE: CPT

## 2019-02-04 RX ORDER — INSULIN GLARGINE 100 [IU]/ML
12 INJECTION, SOLUTION SUBCUTANEOUS
Status: DISCONTINUED | OUTPATIENT
Start: 2019-02-04 | End: 2019-02-06

## 2019-02-04 RX ORDER — THIAMINE MONONITRATE (VIT B1) 100 MG
100 TABLET ORAL DAILY
Status: DISCONTINUED | OUTPATIENT
Start: 2019-02-04 | End: 2019-02-12 | Stop reason: HOSPADM

## 2019-02-04 RX ORDER — FOLIC ACID 1 MG/1
1 TABLET ORAL DAILY
Status: DISCONTINUED | OUTPATIENT
Start: 2019-02-04 | End: 2019-02-12 | Stop reason: HOSPADM

## 2019-02-04 RX ORDER — PANTOPRAZOLE SODIUM 40 MG/1
40 TABLET, DELAYED RELEASE ORAL
Status: DISCONTINUED | OUTPATIENT
Start: 2019-02-05 | End: 2019-02-12 | Stop reason: HOSPADM

## 2019-02-04 RX ADMIN — SODIUM CHLORIDE 100 ML/HR: 0.9 INJECTION, SOLUTION INTRAVENOUS at 13:25

## 2019-02-04 RX ADMIN — FOLIC ACID 1 MG: 1 TABLET ORAL at 22:14

## 2019-02-04 RX ADMIN — INSULIN GLARGINE 12 UNITS: 100 INJECTION, SOLUTION SUBCUTANEOUS at 22:14

## 2019-02-04 RX ADMIN — INSULIN LISPRO 2 UNITS: 100 INJECTION, SOLUTION INTRAVENOUS; SUBCUTANEOUS at 17:44

## 2019-02-04 RX ADMIN — SODIUM CHLORIDE 100 ML/HR: 0.9 INJECTION, SOLUTION INTRAVENOUS at 03:08

## 2019-02-04 RX ADMIN — HEPARIN SODIUM 5000 UNITS: 5000 INJECTION INTRAVENOUS; SUBCUTANEOUS at 05:11

## 2019-02-04 RX ADMIN — Medication 4 G: at 08:06

## 2019-02-04 RX ADMIN — INSULIN LISPRO 3 UNITS: 100 INJECTION, SOLUTION INTRAVENOUS; SUBCUTANEOUS at 17:44

## 2019-02-04 RX ADMIN — HEPARIN SODIUM 5000 UNITS: 5000 INJECTION INTRAVENOUS; SUBCUTANEOUS at 22:15

## 2019-02-04 RX ADMIN — INSULIN LISPRO 2 UNITS: 100 INJECTION, SOLUTION INTRAVENOUS; SUBCUTANEOUS at 12:10

## 2019-02-04 RX ADMIN — Medication 100 MG: at 22:14

## 2019-02-04 NOTE — PLAN OF CARE
DISCHARGE PLANNING     Discharge to home or other facility with appropriate resources Progressing        GENITOURINARY - ADULT     Maintains or returns to baseline urinary function Progressing     Absence of urinary retention Progressing        INFECTION - ADULT     Absence or prevention of progression during hospitalization Progressing     Absence of fever/infection during neutropenic period Progressing        Knowledge Deficit     Patient/family/caregiver demonstrates understanding of disease process, treatment plan, medications, and discharge instructions Progressing        METABOLIC, FLUID AND ELECTROLYTES - ADULT     Electrolytes maintained within normal limits Progressing     Fluid balance maintained Progressing     Glucose maintained within target range Progressing        PAIN - ADULT     Verbalizes/displays adequate comfort level or baseline comfort level Progressing        Potential for Falls     Patient will remain free of falls Progressing        Prexisting or High Potential for Compromised Skin Integrity     Skin integrity is maintained or improved Progressing        SAFETY ADULT     Maintain or return to baseline ADL function Progressing     Maintain or return mobility status to optimal level Progressing

## 2019-02-04 NOTE — CONSULTS
Consultation - [facility] Christina Araujo 50 y o  male MRN: [id number]    Unit/Bed#: [facility] [address] [place] 221-01 Encounter: [id number]      Assessment/Plan     Assessment/Plan:  1  Type 2 diabetes with hyperglycemia:  Blood sugars over 800 on admission  He also had hypoglycemia this morning  Will decrease Lantus to 12 units q h s     Add Humalog 3 units with each meal when eating  Continue scale for correction  Given chronic pancreatitis findings on CT scan, he may function more like a type 1 patient  In this regard, he may need insulin upon discharge  In the setting of anemia, would not check a hemoglobin A1c  We can check a fructosamine though but this will take some time to come back  Will continue to follow and adjust regimen as needed  Monitor closely for hypoglycemia  CC: Diabetes Consult    History of Present Illness     HPI: Marin Rand is a 50y o  year old male with type 2 diabetes for 14 years  He is not on any DM meds at home  He denies any polyuria, polydipsia, nocturia and blurry vision  He denies neuropathy, nephropathy and retinopathy  He denies any hypoglycemia  He was hospitalized in the [facility] for nausea, vomiting, kidney failure  He reports some nausea as well as for appetite at this time  Consults    Review of Systems   Constitutional: Positive for appetite change  Negative for chills and fever  HENT: Negative for congestion and trouble swallowing  Eyes: Negative for visual disturbance  Respiratory: Negative for shortness of breath  Cardiovascular: Negative for palpitations and leg swelling  Gastrointestinal: Positive for nausea  Negative for abdominal pain  Endocrine: Negative for polydipsia and polyuria  Genitourinary: Negative for difficulty urinating and frequency  Musculoskeletal: Negative for arthralgias  Skin: Negative for rash  Neurological: Negative for dizziness and weakness  Psychiatric/Behavioral: Negative for agitation and confusion  Historical Information   Past Medical History:   Diagnosis Date    Alcohol abuse     Chronic pancreatitis ([facility] )     Diabetes mellitus (HCC)     Type 2    Hypertension     Pancreatitis     Paroxysmal A-fib (HCC)     Thrombocytopenia (HCC)      History reviewed  No pertinent surgical history  Social History   History   Alcohol Use    Yes     Comment: Drinks alot every day     History   Drug Use No     History   Smoking Status    Former Smoker   Smokeless Tobacco    Not on file     Family History:   Family History   Problem Relation Age of Onset    Diabetes Mother     Hypertension Mother     Hyperlipidemia Father        Meds/Allergies   Current Facility-Administered Medications   Medication Dose Route Frequency Provider Last Rate Last Dose    heparin (porcine) subcutaneous injection 5,000 Units  5,000 Units Subcutaneous [facility] ARIANE Allred   5,000 Units at 02/04/19 0511    insulin glargine (LANTUS) subcutaneous injection 20 Units 0 2 mL  20 Units Subcutaneous HS Lashay ARIANE Owusu   20 Units at 02/03/19 2147    insulin lispro (HumaLOG) 100 units/mL subcutaneous injection 1-5 Units  1-5 Units Subcutaneous TID AC Jessica K ARIANE Barnes   2 Units at 02/04/19 1210    insulin lispro (HumaLOG) 100 units/mL subcutaneous injection 1-5 Units  1-5 Units Subcutaneous HS Collie Butts, ARIANE   1 Units at 02/03/19 2148    sodium chloride 0 9 % infusion  100 mL/hr Intravenous Continuous ARIANE Perkisn 100 mL/hr at 02/04/19 1325 100 mL/hr at 02/04/19 1325     No Known Allergies    Objective   Vitals: Blood pressure 111/68, pulse 86, temperature 99 9 °F (37 7 °C), temperature source Temporal, resp  rate 18, weight 66 kg (145 lb 8 1 oz), SpO2 98 %      Intake/Output Summary (Last 24 hours) at 02/04/19 1637  Last data filed at 02/04/19 1324   Gross per 24 hour   Intake             2000 ml   Output             2450 ml   Net             -450 ml     Invasive Devices     Peripheral Intravenous Line Peripheral IV 02/02/19 Left Wrist 2 days    Peripheral IV 02/02/19 Right Antecubital 2 days                Physical Exam   Constitutional: He is oriented to person, place, and time  He appears well-developed and well-nourished  No distress  HENT:   Head: Normocephalic and atraumatic  Eyes: Pupils are equal, round, and reactive to light  Conjunctivae are normal    Neck: Normal range of motion  Neck supple  Cardiovascular: Normal rate and regular rhythm  Pulmonary/Chest: Effort normal and breath sounds normal  No respiratory distress  Abdominal: Soft  Bowel sounds are normal  He exhibits no distension  Musculoskeletal: Normal range of motion  He exhibits no edema  Neurological: He is alert and oriented to person, place, and time  He exhibits normal muscle tone  Skin: Skin is warm and dry  No rash noted  He is not diaphoretic  Psychiatric: He has a normal mood and affect  His behavior is normal    Vitals reviewed  The history was obtained from the review of the chart, patient  Lab Results:       Lab Results   Component Value Date    WBC 12 39 (H) 02/04/2019    HGB 7 0 (L) 02/04/2019    HCT 20 4 (L) 02/04/2019    MCV 96 02/04/2019     (H) 02/04/2019     Lab Results   Component Value Date/Time    BUN 38 (H) 02/04/2019 05:21 AM    BUN 9 04/11/2018 10:15 AM     04/11/2018 10:15 AM    K 3 6 02/04/2019 05:21 AM    K 4 6 04/11/2018 10:15 AM     02/04/2019 05:21 AM     04/11/2018 10:15 AM    CO2 21 02/04/2019 05:21 AM    CO2 28 04/11/2018 10:15 AM    CREATININE 3 48 (H) 02/04/2019 05:21 AM    CREATININE 0 86 09/14/2015 06:22 PM    AST 18 02/02/2019 12:46 PM    AST 28 04/11/2018 10:15 AM    ALT 37 02/02/2019 12:46 PM    ALT 40 04/11/2018 10:15 AM    ALB 3 0 (L) 02/02/2019 12:46 PM    ALB 4 5 04/11/2018 10:15 AM     No results for input(s): CHOL, HDL, LDL, TRIG, VLDL in the last 72 hours    No results found for: Viviane Souza  POC Glucose   Date Value 02/04/2019 251 mg/dl (H)   02/04/2019 113 mg/dl   02/04/2019 37 mg/dl (LL)   02/03/2019 185 mg/dl (H)   02/03/2019 135 mg/dl   02/03/2019 179 mg/dl (H)   02/03/2019 305 mg/dl (H)   02/03/2019 252 mg/dl (H)   02/03/2019 114 mg/dl   02/03/2019 37 mg/dl (LL)   12/25/2014 124 mg/dL       Imaging Studies: I have personally reviewed pertinent reports  CT abdomen pelvis wo contrast [[id number]] Collected: 02/02/19 1426   Order Status: Completed Updated: 02/02/19 1442   Narrative:     CT ABDOMEN AND PELVIS WITHOUT IV CONTRAST    INDICATION: Flank pain, nausea, vomiting    COMPARISON:  None  TECHNIQUE:  CT examination of the abdomen and pelvis was performed without intravenous contrast   Axial, sagittal, and coronal 2D reformatted images were created from the source data and submitted for interpretation  Radiation dose length product (DLP) for this visit:  451 mGy-cm    This examination, like all CT scans performed in the [facility], was performed utilizing techniques to minimize radiation dose exposure, including the use of iterative   reconstruction and automated exposure control  Enteric contrast was administered  FINDINGS:    ABDOMEN    LOWER CHEST:  No clinically significant abnormality identified in the visualized lower chest     LIVER/BILIARY TREE:  Unremarkable  GALLBLADDER:  No calcified gallstones  No pericholecystic inflammatory change  SPLEEN:  Unremarkable  PANCREAS:  Pancreatic calcifications likely related to pancreatitis in addition to pancreatic atrophy  Suspect dilated pancreatic duct which is unchanged from the prior exam likely from chronic pancreatitis  ADRENAL GLANDS:  Unremarkable  KIDNEYS/URETERS:  Minimal right calyceal dilatation/questionable hydronephrosis  STOMACH AND BOWEL:  Unremarkable  APPENDIX:  No findings to suggest appendicitis  ABDOMINOPELVIC CAVITY:  No ascites or free intraperitoneal air  No lymphadenopathy      VESSELS:  Unremarkable for patient's age  PELVIS    REPRODUCTIVE ORGANS:  Unremarkable for patient's age  URINARY BLADDER:  Unremarkable  ABDOMINAL WALL/INGUINAL REGIONS:  Unremarkable  OSSEOUS STRUCTURES:  No acute fracture or destructive osseous lesion  Disc bulges at L4-5 and L5-S1 causing at least mild spinal canal narrowing  Impression:       Minimal right calyceal dilatation/questionable hydronephrosis  Portions of the record may have been created with voice recognition software  Occasional wrong word or "sound a like" substitutions may have occurred due to the inherent limitations of voice recognition software  Read the chart carefully and recognize, using context, where substitutions have occurred

## 2019-02-04 NOTE — CONSULTS
Consulted for dietary recomendations  Pt reported usually good appetite except recently decreased po due to not feeling well and thinks he may have lost a couple pounds  Currently no wt hx records  Pt didn't provided specifics on diet hx  Per H&P pt's family reported pt consumes alcohol excessively  Offered diet education but pt wasn't interested, provided outpt RD contact information for nutrition counseling  Pt tolerating po well, reported completing meals w/ no concerns  D/t alcohol abuse consider adding thiamine and folic acid  Continue CCD2

## 2019-02-04 NOTE — PLAN OF CARE
DISCHARGE PLANNING     Discharge to home or other facility with appropriate resources Progressing        GENITOURINARY - ADULT     Maintains or returns to baseline urinary function Progressing     Absence of urinary retention Progressing        HEMATOLOGIC - ADULT     Maintains hematologic stability Progressing        INFECTION - ADULT     Absence or prevention of progression during hospitalization Progressing     Absence of fever/infection during neutropenic period Progressing        Knowledge Deficit     Patient/family/caregiver demonstrates understanding of disease process, treatment plan, medications, and discharge instructions Progressing        METABOLIC, FLUID AND ELECTROLYTES - ADULT     Electrolytes maintained within normal limits Progressing     Fluid balance maintained Progressing     Glucose maintained within target range Progressing        PAIN - ADULT     Verbalizes/displays adequate comfort level or baseline comfort level Progressing        Potential for Falls     Patient will remain free of falls Progressing        Prexisting or High Potential for Compromised Skin Integrity     Skin integrity is maintained or improved Progressing        SAFETY ADULT     Maintain or return to baseline ADL function Progressing     Maintain or return mobility status to optimal level Progressing

## 2019-02-04 NOTE — PROGRESS NOTES
NEPHROLOGY PROGRESS NOTE   Diogenes Platt 50 y o  male MRN: [id number]  Unit/Bed#: [place] [address] [place] 221-01 Encounter: [id number]      ASSESSMENT & PLAN:  1  Acute kidney injury, suspected multifactorial:  secondary to pre renal component in the setting of uncontrolled glycemia, nausea, vomiting and diarrhea, as well as severe anemia that likely progressed to ATN  Last creatinine in 2016 1  16  Serum creatinine on admission 7 65, improving down to 3 48 today  Given significant improvement in serum creatinine there is a prerenal component  Continue with same intravenously fluids for now, follow labs in the morning  Avoid hypotension, avoid nephrotoxins  2  HHNK versus DKA, continue management per primary team   Previously on insulin drip  Will need endocrinology follow-up  3  Anion gap metabolic acidosis, lactic acidosis  Improved with medical treatment  4  Pseudohyponatremia admission when corrected for hyperglycemia  Currently serum sodium is stable on normalized  5  Severe anemia, status post transfusion  Monitor H&H and transfusion as needed  6  Reported mild right calyceal dilation/questionable hydronephrosis  Renal ultrasound pending  Discussed with Dr Oskar Day from Internal Medicine team       SUBJECTIVE:  Patient seen and examined, denies any complaints, no chest pain or shortness of breath, no abdominal pain, no nausea vomiting    Patient under at bedside    OBJECTIVE:  Current Weight: Weight - Scale: 66 kg (145 lb 8 1 oz)  Vitals:    02/04/19 0759   BP: 131/73   Pulse: 84   Resp: 18   Temp: 99 1 °F (37 3 °C)   SpO2: 98%       Intake/Output Summary (Last 24 hours) at 02/04/19 0931  Last data filed at 02/04/19 7792   Gross per 24 hour   Intake          1748 33 ml   Output             2600 ml   Net          -851 67 ml     General: conscious, cooperative, in not acute distress  Eyes: conjunctivae pink, anicteric sclerae  ENT: lips and mucous membranes moist  Neck: supple, no JVD  Chest: clear breath sounds bilateral, no crackles, ronchus or wheezings  CVS: distinct S1 & S2, normal rate, regular rhythm  Abdomen: soft, non-tender, non-distended, normoactive bowel sounds  Extremities: no edema of both legs  Skin: no rash  Neuro: awake, alert, oriented    Medications:    Current Facility-Administered Medications:     heparin (porcine) subcutaneous injection 5,000 Units, 5,000 Units, Subcutaneous, Q8H [address], 5,000 Units at 02/04/19 0511 **AND** Platelet count, , , Once, Imagene ARIANE Almonte    insulin glargine (LANTUS) subcutaneous injection 20 Units 0 2 mL, 20 Units, Subcutaneous, HS, ARIANE Gaitan Se, 20 Units at 02/03/19 2147    insulin lispro (HumaLOG) 100 units/mL subcutaneous injection 1-5 Units, 1-5 Units, Subcutaneous, TID AC, 3 Units at 02/03/19 1238 **AND** Fingerstick Glucose (POCT), , , TID AC, Jessica K ARIANE Barnes    insulin lispro (HumaLOG) 100 units/mL subcutaneous injection 1-5 Units, 1-5 Units, Subcutaneous, HS, ARIANE Gaitan Se, 1 Units at 02/03/19 2148    sodium chloride 0 9 % infusion, 100 mL/hr, Intravenous, Continuous, ARIANE Gaitan Se, Last Rate: 100 mL/hr at 02/04/19 0308, 100 mL/hr at 02/04/19 0308    Invasive Devices:        Lab Results:     Results from last 7 days  Lab Units 02/04/19  0521 02/03/19  0515 02/02/19  2355 02/02/19  1723 02/02/19  1246   WBC Thousand/uL 12 39* 16 49*  --   --  8 93   HEMOGLOBIN g/dL 7 0* 7 5*  --   --  6 0*   HEMATOCRIT % 20 4* 21 7*  --   --  17 8*   PLATELETS Thousands/uL 401* 448*  --   --  404*   SODIUM mmol/L 140 142 136 137 122*   POTASSIUM mmol/L 3 6 3 6 4 6 3 5 5 1   CHLORIDE mmol/L 108 110* 106 103 90*   CO2 mmol/L 21 20* 18* 19* 18*   BUN mg/dL 38* 57* 61* 69* 78*   CREATININE mg/dL 3 48* 5 04* 5 51* 6 58* 7 65*   CALCIUM mg/dL 7 6* 7 3* 6 7* 7 2* 7 1*   MAGNESIUM mg/dL 1 7 3 3*  --  1 3*  --    PHOSPHORUS mg/dL 4 5  --   --  4 5  --    ALK PHOS U/L  --   --   --   --  74   ALT U/L  --   --   --   --  37   AST U/L  -- --   --   --  18       Previous work up:  CT scan of the abdomen and pelvis reported as mild right calyceal dilation/questionable hydronephrosis  Renal ultrasound ordered      Portions of the record may have been created with voice recognition software  Occasional wrong word or "sound a like" substitutions may have occurred due to the inherent limitations of voice recognition software  Read the chart carefully and recognize, using context, where substitutions have occurred  If you have any questions, please contact the dictating provider

## 2019-02-05 LAB
ABO GROUP BLD: NORMAL
ANION GAP SERPL CALCULATED.3IONS-SCNC: 9 MMOL/L (ref 4–13)
BASOPHILS # BLD AUTO: 0.06 THOUSANDS/ΜL (ref 0–0.1)
BASOPHILS NFR BLD AUTO: 1 % (ref 0–1)
BLD GP AB SCN SERPL QL: NEGATIVE
BUN SERPL-MCNC: 26 MG/DL (ref 5–25)
CALCIUM SERPL-MCNC: 7.5 MG/DL (ref 8.3–10.1)
CHLORIDE SERPL-SCNC: 110 MMOL/L (ref 100–108)
CO2 SERPL-SCNC: 21 MMOL/L (ref 21–32)
CREAT SERPL-MCNC: 2.8 MG/DL (ref 0.6–1.3)
EOSINOPHIL # BLD AUTO: 0.5 THOUSAND/ΜL (ref 0–0.61)
EOSINOPHIL NFR BLD AUTO: 4 % (ref 0–6)
ERYTHROCYTE [DISTWIDTH] IN BLOOD BY AUTOMATED COUNT: 12.6 % (ref 11.6–15.1)
FERRITIN SERPL-MCNC: 1375 NG/ML (ref 8–388)
GFR SERPL CREATININE-BSD FRML MDRD: 26 ML/MIN/1.73SQ M
GLUCOSE SERPL-MCNC: 117 MG/DL (ref 65–140)
GLUCOSE SERPL-MCNC: 158 MG/DL (ref 65–140)
GLUCOSE SERPL-MCNC: 191 MG/DL (ref 65–140)
GLUCOSE SERPL-MCNC: 243 MG/DL (ref 65–140)
GLUCOSE SERPL-MCNC: 97 MG/DL (ref 65–140)
HAPTOGLOB SERPL-MCNC: 47 MG/DL (ref 34–200)
HCT VFR BLD AUTO: 21.5 % (ref 36.5–49.3)
HGB BLD-MCNC: 7.3 G/DL (ref 12–17)
IMM GRANULOCYTES # BLD AUTO: 0.15 THOUSAND/UL (ref 0–0.2)
IMM GRANULOCYTES NFR BLD AUTO: 1 % (ref 0–2)
IRON SERPL-MCNC: 195 UG/DL (ref 65–175)
LYMPHOCYTES # BLD AUTO: 3.13 THOUSANDS/ΜL (ref 0.6–4.47)
LYMPHOCYTES NFR BLD AUTO: 25 % (ref 14–44)
MCH RBC QN AUTO: 33.2 PG (ref 26.8–34.3)
MCHC RBC AUTO-ENTMCNC: 34 G/DL (ref 31.4–37.4)
MCV RBC AUTO: 98 FL (ref 82–98)
MONOCYTES # BLD AUTO: 0.88 THOUSAND/ΜL (ref 0.17–1.22)
MONOCYTES NFR BLD AUTO: 7 % (ref 4–12)
NEUTROPHILS # BLD AUTO: 7.94 THOUSANDS/ΜL (ref 1.85–7.62)
NEUTS SEG NFR BLD AUTO: 62 % (ref 43–75)
NRBC BLD AUTO-RTO: 0 /100 WBCS
PLATELET # BLD AUTO: 391 THOUSANDS/UL (ref 149–390)
PMV BLD AUTO: 10.6 FL (ref 8.9–12.7)
POTASSIUM SERPL-SCNC: 3.8 MMOL/L (ref 3.5–5.3)
RBC # BLD AUTO: 2.2 MILLION/UL (ref 3.88–5.62)
RH BLD: POSITIVE
SODIUM SERPL-SCNC: 140 MMOL/L (ref 136–145)
SPECIMEN EXPIRATION DATE: NORMAL
TIBC SERPL-MCNC: 191 UG/DL (ref 250–450)
WBC # BLD AUTO: 12.66 THOUSAND/UL (ref 4.31–10.16)

## 2019-02-05 PROCEDURE — 86850 RBC ANTIBODY SCREEN: CPT | Performed by: INTERNAL MEDICINE

## 2019-02-05 PROCEDURE — 99232 SBSQ HOSP IP/OBS MODERATE 35: CPT | Performed by: INTERNAL MEDICINE

## 2019-02-05 PROCEDURE — 84166 PROTEIN E-PHORESIS/URINE/CSF: CPT | Performed by: INTERNAL MEDICINE

## 2019-02-05 PROCEDURE — 86900 BLOOD TYPING SEROLOGIC ABO: CPT | Performed by: INTERNAL MEDICINE

## 2019-02-05 PROCEDURE — 86923 COMPATIBILITY TEST ELECTRIC: CPT

## 2019-02-05 PROCEDURE — 82985 ASSAY OF GLYCATED PROTEIN: CPT | Performed by: INTERNAL MEDICINE

## 2019-02-05 PROCEDURE — 83550 IRON BINDING TEST: CPT | Performed by: INTERNAL MEDICINE

## 2019-02-05 PROCEDURE — 82948 REAGENT STRIP/BLOOD GLUCOSE: CPT

## 2019-02-05 PROCEDURE — 85025 COMPLETE CBC W/AUTO DIFF WBC: CPT | Performed by: INTERNAL MEDICINE

## 2019-02-05 PROCEDURE — 83540 ASSAY OF IRON: CPT | Performed by: INTERNAL MEDICINE

## 2019-02-05 PROCEDURE — 86901 BLOOD TYPING SEROLOGIC RH(D): CPT | Performed by: INTERNAL MEDICINE

## 2019-02-05 PROCEDURE — 80048 BASIC METABOLIC PNL TOTAL CA: CPT | Performed by: INTERNAL MEDICINE

## 2019-02-05 PROCEDURE — 82728 ASSAY OF FERRITIN: CPT | Performed by: INTERNAL MEDICINE

## 2019-02-05 RX ADMIN — INSULIN LISPRO 1 UNITS: 100 INJECTION, SOLUTION INTRAVENOUS; SUBCUTANEOUS at 13:18

## 2019-02-05 RX ADMIN — HEPARIN SODIUM 5000 UNITS: 5000 INJECTION INTRAVENOUS; SUBCUTANEOUS at 21:42

## 2019-02-05 RX ADMIN — INSULIN LISPRO 3 UNITS: 100 INJECTION, SOLUTION INTRAVENOUS; SUBCUTANEOUS at 13:19

## 2019-02-05 RX ADMIN — INSULIN LISPRO 3 UNITS: 100 INJECTION, SOLUTION INTRAVENOUS; SUBCUTANEOUS at 17:15

## 2019-02-05 RX ADMIN — HEPARIN SODIUM 5000 UNITS: 5000 INJECTION INTRAVENOUS; SUBCUTANEOUS at 06:19

## 2019-02-05 RX ADMIN — SODIUM CHLORIDE 100 ML/HR: 0.9 INJECTION, SOLUTION INTRAVENOUS at 19:17

## 2019-02-05 RX ADMIN — PANTOPRAZOLE SODIUM 40 MG: 40 TABLET, DELAYED RELEASE ORAL at 06:19

## 2019-02-05 RX ADMIN — Medication 100 MG: at 08:58

## 2019-02-05 RX ADMIN — SODIUM CHLORIDE 100 ML/HR: 0.9 INJECTION, SOLUTION INTRAVENOUS at 09:00

## 2019-02-05 RX ADMIN — FOLIC ACID 1 MG: 1 TABLET ORAL at 08:58

## 2019-02-05 RX ADMIN — INSULIN LISPRO 1 UNITS: 100 INJECTION, SOLUTION INTRAVENOUS; SUBCUTANEOUS at 17:14

## 2019-02-05 RX ADMIN — INSULIN GLARGINE 12 UNITS: 100 INJECTION, SOLUTION SUBCUTANEOUS at 21:41

## 2019-02-05 NOTE — PROGRESS NOTES
Progress Note - Angeline Jaquez 50 y o  male MRN: [id number]    Unit/Bed#: [place]  -01 Encounter: [id number]      Assessment/Plan:  1-acute kidney injury:  Patient presented with acute kidney injury with a creatinine of 7 65 on admission  His previous creatinine had been 1 16 in 2016               -patient was evaluated by the nephrology service  His acute kidney injury was felt to be multifactorial, secondary to prerenal, from hyperglycemia, nausea/vomiting/ diarrhea causing volume depletion, plus severe anemia, that then progressed to ATN  -home hydrochlorothiazide and lisinopril were held              -creatinine improved to 2 8 today      2-anemia:  Patient presented with acute anemia with a hemoglobin of 6  His previous hemoglobin have been 14 in April 2018  -he denies any recent injury or blood loss  Denies any hematuria, hematochezia, or melena  -E35 and folic acid levels appropriate              -iron and ferritin wnl              -Hemoccult all stools              -negative schistocytes  Haptoglobin still pending  LDH only mildly elevated               -patient had a precipitous drop in his hemoglobin from 14 down to 6  Not likely secondary to chronic kidney disease alone      -check spep and upep   -will confer with hematology     3-HHNK:  diabetes type 2, with hyperglycemia:  With long-term use of insulin  Without complication  -patient is a history of type 2 diabetes for the past 14 years  He has been on Lantus 25 units q h s, as well as metformin 1000 mg b i d   This was confirmed in his [facility] records as far back as November 2016               -patient however states that he was in the [facility] for a month, and did not bring his insulin with him    He was without his insulin for a month, prior to becoming ill in presenting to the ER in the [facility]               -which he returned [facility], patient presented to the ER with hyperglycemia, with a blood sugar of 853  He was admitted to the critical care service, started on an insulin infusion  -patient improved, and was taken off the infusion and started on Lantus insulin, and sliding scale Humalog               -evaluated by the Endocrinology service  It was noted the patient chronic pancreatitis findings on CT scan, therefore patient may respond more like a type 1 diabetes patient, and will continue to require insulin at discharge    -, 158, 191   -cont current regimen and monitor  Titrate as needed    4-pseudohyponatremia:  Secondary to hyperglycemia  Currently normalized, with improvement in the hyperglycemia      5-questionable hydronephrosis:  On initial CT scan  However renal ultrasound without any evidence of hydronephrosis or stone  Medical renal disease noted      6-alcohol abuse:  Patient's family reported to the staff that patient drinks rum daily  They are concerned about his alcohol intake  Patient's outpatient records no history of alcohol abuse from 2016                -will start thiamine and folic acid  -patient is currently at hospital day 3,  No current signs or symptoms of withdrawal     7-lactic acidosis:  Likely secondary to volume depletion and acute kidney injury      8-essential hypertension:  Patient has a history of essential hypertension  Was on HCTZ and ACE inhibitor as an outpatient  -currently normotensive:  monitor off medication     9-history of atrial fibrillation: per old records  In NSR  Cont to monitor      10-history pancreatitis:  Possibly secondary to reported history of alcohol abuse     11-status for altered mental status:  Patient with reported altered mental status initially  His CT scan of the brain did not have any acute abnormalities  He did not have any focal neurologic deficits on exam               -currently awake, alert, oriented  No focal neurologic deficits  Patient does, at times, have a stuttering speech, however it is fluent and other times  Patient indicates that this is his baseline  Discussed with patient's father, and he confirms that patient appears at his baseline mentally, and from a speech standpoint              -previous altered mental status secondary to toxic metabolic encephalopathy from his NK    12-leukocytosis:  Pt with initial leukocytosis of 16 4  Improved to 12 without antibiotic intervention  Patient currently afebrile  Denies any signs or symptoms of infection  Lungs are clear  No bandemia  Likely reactive secondary to [address]    13-family:  Updated patient's [facility] [place] [place]  Answered all questions  Discussed with Dr Josh Ervin discharge home tomorrow if continues to improve        VTE Pharmacologic Prophylaxis: Heparin  VTE Mechanical Prophylaxis: sequential compression device      Certification Statement: The patient will continue to require additional inpatient hospital stay due to need for further acute intervention for anemia, acute kidney injury    Status: inpatient     ===================================================================    Subjective: The patient is examined and interviewed by me in Pashto at the bedside  He denies any pain anywhere at all  He denies any shortness of breath or cough  He denies any nausea, vomiting, diarrhea  He is tolerating p o  He denies any headache or visual changes  He denies any dizziness or lightheadedness  He denies any weakness  He denies any bleeding  He notes he is passing his urine without any difficulties  Physical Exam:   Temp:  [98 5 °F (36 9 °C)-98 8 °F (37 1 °C)] 98 5 °F (36 9 °C)  HR:  [68-81] 81  Resp:  [18] 18  BP: (116-128)/(70-78) 122/78    Gen:  Pleasant, non-tachypnic, non-dyspnic  Conversant  Heart: regular rate and rhythm, S1S2 present, no murmur, rub or gallop  Lungs: clear to ausculatation bilaterally    No wheezing, crackles, or rhonchi  No accessory muscle use or respiratory distress  Abd: soft, non-tender, non-distended  NABS, no guarding, rebound or peritoneal signs  Extremities: no clubbing, cyanosis or edema  2+pedal pulses bilaterally  Full range of motion  Neuro: awake, alert and oriented  Cranial nerves 2-12 intact  Strength 5/5 bilateral upper extremities and bilateral lower extremities intact  Noted to have episodes of stuttering speech  Skin: warm and dry: no petechiae, purpura and rash  LABS:     Results from last 7 days  Lab Units 02/05/19 0437 02/04/19 0521 02/03/19 0515   WBC Thousand/uL 12 66* 12 39* 16 49*   HEMOGLOBIN g/dL 7 3* 7 0* 7 5*   HEMATOCRIT % 21 5* 20 4* 21 7*   PLATELETS Thousands/uL 391* 401* 448*       Results from last 7 days  Lab Units 02/05/19 0437 02/04/19 0521 02/03/19 0515   POTASSIUM mmol/L 3 8 3 6 3 6   CHLORIDE mmol/L 110* 108 110*   CO2 mmol/L 21 21 20*   BUN mg/dL 26* 38* 57*   CREATININE mg/dL 2 80* 3 48* 5 04*   CALCIUM mg/dL 7 5* 7 6* 7 3*       Hospital Data:    2/4:  Renal ultrasound:  Echogenic renal parenchyma consistent with medical renal disease   No hydronephrosis     2/2:  CT abdomen/pelvis:  Minimal right calyceal dilatation/questionable hydronephrosis     2/2:  CT brain:  No acute abnormality     2/2:  Chest x-ray:  No acute disease     Microbiology:  2/2:  Blood culture:  Negative x2     Fecal occult blood:  Testing still pending           ---------------------------------------------------------------------------------------------------------------  This note has been constructed using a voice recognition system

## 2019-02-05 NOTE — PLAN OF CARE
DISCHARGE PLANNING     Discharge to home or other facility with appropriate resources Progressing        GENITOURINARY - ADULT     Maintains or returns to baseline urinary function Progressing     Absence of urinary retention Progressing        HEMATOLOGIC - ADULT     Maintains hematologic stability Progressing        INFECTION - ADULT     Absence or prevention of progression during hospitalization Progressing     Absence of fever/infection during neutropenic period Progressing        Knowledge Deficit     Patient/family/caregiver demonstrates understanding of disease process, treatment plan, medications, and discharge instructions Progressing        METABOLIC, FLUID AND ELECTROLYTES - ADULT     Electrolytes maintained within normal limits Progressing     Fluid balance maintained Progressing     Glucose maintained within target range Progressing        Nutrition/Hydration-ADULT     Nutrient/Hydration intake appropriate for improving, restoring or maintaining nutritional needs Progressing        PAIN - ADULT     Verbalizes/displays adequate comfort level or baseline comfort level Progressing        Potential for Falls     Patient will remain free of falls Progressing        Prexisting or High Potential for Compromised Skin Integrity     Skin integrity is maintained or improved Progressing        SAFETY ADULT     Maintain or return to baseline ADL function Progressing     Maintain or return mobility status to optimal level Progressing

## 2019-02-05 NOTE — PROGRESS NOTES
NEPHROLOGY PROGRESS NOTE   Elayne March 50 y o  male MRN: [id number]  [facility] [place] [facility] Encounter: [id number]      ASSESSMENT & PLAN:  1  Acute kidney injury on top of CKD given proteinuria back since 2015, suspected multifactorial:  secondary to pre renal component in the setting of uncontrolled glycemia, nausea, vomiting and diarrhea, as well as severe anemia that likely progressed to ATN  Last creatinine in 2016 1  16  Serum creatinine on admission 7 65, renal function improving, serum creatinine down to 2 8 today     Continue with same intravenously fluids for now, follow daily labs  Avoid hypotension, avoid nephrotoxins  No NSAIDs, patient reported was taking Motrin for back pain once a week before this admission  He needs outpatient follow-up with Nephrology  2  Type 2 diabetes with hyperglycemia  HHNK versus DKA, continue management per primary team   Previously on insulin drip  Endocrinology on board managing diabetes    3  Anion gap metabolic acidosis, lactic acidosis  Improved with medical treatment  4  Pseudohyponatremia admission when corrected for hyperglycemia  Currently serum sodium is stable and normalized  5  Severe anemia, status post transfusion  Monitor H&H and transfusion as needed  6  Reported mild right calyceal dilation/questionable hydronephrosis on CT scan  Renal ultrasound did not show any hydronephrosis  Discussed with Dr Debora Coffey from Internal Medicine team       SUBJECTIVE:  Patient seen and examined, no complaints, denies shortness of breath or chest pain, denies urinary problems      OBJECTIVE:  Current Weight: Weight - Scale: 67 4 kg (148 lb 9 4 oz)  Vitals:    02/05/19 0720   BP: 128/70   Pulse: 68   Resp: 18   Temp: 98 8 °F (37 1 °C)   SpO2: 98%       Intake/Output Summary (Last 24 hours) at 02/05/19 0926  Last data filed at 02/05/19 0901   Gross per 24 hour   Intake             2440 ml   Output             4050 ml   Net            -1610 ml General: conscious, cooperative, in not acute distress  Eyes: conjunctivae pink, anicteric sclerae  ENT: lips and mucous membranes moist  Neck: supple, no JVD  Chest: clear breath sounds bilateral, no crackles, ronchus or wheezings  CVS: distinct S1 & S2, normal rate, regular rhythm  Abdomen: soft, non-tender, non-distended, normoactive bowel sounds  Extremities: no edema of both legs  Skin: no rash  Neuro: awake, alert, oriented      Medications:    Current Facility-Administered Medications:     folic acid (FOLVITE) tablet 1 mg, 1 mg, Oral, Daily, Naldo Nash MD, 1 mg at 02/05/19 0858    heparin (porcine) subcutaneous injection 5,000 Units, 5,000 Units, Subcutaneous, Q8H [facility] & half-way, 5,000 Units at 02/05/19 0619 **AND** Platelet count, , , Once, ARIANE Burdick    insulin glargine (LANTUS) subcutaneous injection 12 Units 0 12 mL, 12 Units, Subcutaneous, HS, Fede [facility], DO, 12 Units at 02/04/19 2214    insulin lispro (HumaLOG) 100 units/mL subcutaneous injection 1-5 Units, 1-5 Units, Subcutaneous, TID AC, 2 Units at 02/04/19 1744 **AND** Fingerstick Glucose (POCT), , , TID AC, ARIANE Lopez    insulin lispro (HumaLOG) 100 units/mL subcutaneous injection 3 Units, 3 Units, Subcutaneous, TID With Meals, [facility], DO, 3 Units at 02/04/19 1744    pantoprazole (PROTONIX) EC tablet 40 mg, 40 mg, Oral, Early Morning, Naldo Nash MD, 40 mg at 02/05/19 0773    sodium chloride 0 9 % infusion, 100 mL/hr, Intravenous, Continuous, ARIANE Lopez, Last Rate: 100 mL/hr at 02/05/19 0900, 100 mL/hr at 02/05/19 0900    thiamine (VITAMIN B1) tablet 100 mg, 100 mg, Oral, Daily, Naldo Nash MD, 100 mg at 02/05/19 0858    Invasive Devices:        Lab Results:     Results from last 7 days  Lab Units 02/05/19  0437 02/04/19  0521 02/03/19  0515  02/02/19  1723 02/02/19  1246   WBC Thousand/uL 12 66* 12 39* 16 49*  --   --  8 93   HEMOGLOBIN g/dL 7 3* 7 0* 7 5*  --   --  6 0*   HEMATOCRIT % 21 5* 20 4* 21 7*  --   --  17 8*   PLATELETS Thousands/uL 391* 401* 448*  --   --  404*   SODIUM mmol/L 140 140 142  < > 137 122*   POTASSIUM mmol/L 3 8 3 6 3 6  < > 3 5 5 1   CHLORIDE mmol/L 110* 108 110*  < > 103 90*   CO2 mmol/L 21 21 20*  < > 19* 18*   BUN mg/dL 26* 38* 57*  < > 69* 78*   CREATININE mg/dL 2 80* 3 48* 5 04*  < > 6 58* 7 65*   CALCIUM mg/dL 7 5* 7 6* 7 3*  < > 7 2* 7 1*   MAGNESIUM mg/dL  --  1 7 3 3*  --  1 3*  --    PHOSPHORUS mg/dL  --  4 5  --   --  4 5  --    ALK PHOS U/L  --   --   --   --   --  74   ALT U/L  --   --   --   --   --  37   AST U/L  --   --   --   --   --  18   < > = values in this interval not displayed  Previous work up:  CT scan of the abdomen and pelvis reported as mild right calyceal dilation/questionable hydronephrosis  Renal ultrasound reported as echogenic bilateral renal parenchyma consistent with medical renal disease, no hydronephrosis      Portions of the record may have been created with voice recognition software  Occasional wrong word or "sound a like" substitutions may have occurred due to the inherent limitations of voice recognition software  Read the chart carefully and recognize, using context, where substitutions have occurred  If you have any questions, please contact the dictating provider

## 2019-02-05 NOTE — PROGRESS NOTES
Progress Note - Cathy Bernstein 50 y o  male MRN: [id number]    Unit/Bed#: [facility] 2 -01 Encounter: [id number]      Assessment/Plan:  1-acute kidney injury:  Patient presented with acute kidney injury with a creatinine of 7 65 on admission  His previous creatinine had been 1 16 in 2016    -patient was evaluated by the nephrology service  His acute kidney injury was felt to be multifactorial, secondary to prerenal, from hyperglycemia, nausea/vomiting/ diarrhea causing volume depletion, plus severe anemia, that then progressed to ATN  -home hydrochlorothiazide and lisinopril were held   -creatinine improved to 3 48 today  2-anemia:  Patient presented with acute anemia with a hemoglobin of 6  His previous hemoglobin have been 14 in April 2018  -he denies any recent injury or blood loss  Denies any hematuria, hematochezia, or melena  -Q84 and folic acid levels appropriate   -check iron panel, and ferritin   -Hemoccult all stools   -negative schistocytes  Haptoglobin still pending  LDH only mildly elevated    -patient had a precipitous drop in his hemoglobin from 14 down to 6  Not likely secondary to chronic kidney disease alone  May need Hematology evaluation    3-HHNK:  diabetes type 2, with hyperglycemia:  With long-term use of insulin  Without complication  -patient is a history of type 2 diabetes for the past 14 years  He has been on Lantus 25 units q h s, as well as metformin 1000 mg b i d   This was confirmed in his [facility] records as far back as November 2016    -patient however states that he was in the [facility] for a month, and did not bring his insulin with him  He was without his insulin for a month, prior to becoming ill in presenting to the ER in the [facility]    -which he returned [place], patient presented to the ER with hyperglycemia, with a blood sugar of 853  He was admitted to the critical care service, started on an insulin infusion     -patient improved, and was taken off the infusion and started on Lantus insulin, and sliding scale Humalog    -evaluated by the Endocrinology service  It was noted the patient chronic pancreatitis findings on CT scan, therefore patient may respond more like a type 1 diabetes patient, and will continue to require insulin at discharge    4-pseudohyponatremia:  Secondary to hyperglycemia  Currently normalized, with improvement in the hyperglycemia     5-questionable hydronephrosis:  On initial CT scan  However renal ultrasound without any evidence of hydronephrosis or stone  Medical renal disease noted  6-alcohol abuse:  Patient's family reported to the staff that patient drinks rum daily  They are concerned about his alcohol intake  Patient's outpatient records no history of alcohol abuse from 2016     -will start thiamine and folic acid  -patient is currently at hospital day 2, over 48 hours  No current signs or symptoms of withdrawal, will therefore defer starting Librium at this time    7-lactic acidosis:  Likely secondary to volume depletion and acute kidney injury  8-essential hypertension:  Patient has a history of essential hypertension  Was on HCTZ and ACE inhibitor as an outpatient  -currently normotensive:  monitor off medication    9-history of atrial fibrillation: per old records  In NSR  Cont to monitor  10-history pancreatitis:  Possibly secondary to reported history of alcohol abuse    11-altered mental status:  Patient with reported altered mental status initially  His CT scan of the brain did not have any acute abnormalities  He did not have any focal neurologic deficits on exam    -currently awake, alert, oriented  No focal neurologic deficits  Patient does, at times, have a stuttering speech, however it is fluent and other times    Patient indicates that this is his baseline    -previous altered mental status secondary to toxic metabolic encephalopathy from his NK      VTE Pharmacologic Prophylaxis: Heparin  VTE Mechanical Prophylaxis: sequential compression device    Certification Statement: The patient will continue to require additional inpatient hospital stay due to need for further acute intervention for acute kidney injury    Status: inpatient     Total time spent including interview, exam, reviewed the critical care records, chart, radiology reports, lab work, as well as review of his previous [facility] records, and previous lab work:  50 minutes    ===================================================================    Subjective:  Patient examined and interviewed by me in [place] at the bedside  He denies any complaints at all at this time, apart from feeling hungry  He notes he ate dinner at 5:30 a m , and wishes to eat again  He is requesting his family bring him in food  Patient denies any pain anywhere at all  He specifically denies any headache, chest pain, back pain, abdominal pain  He denies any shortness of breath, chest congestion, or cough  He denies any nausea, vomiting, diarrhea  He is tolerating p o  Without any difficulty  Patient relates that he has been on Lantus insulin 25 units at bedtime  He notes he takes this every night at 9:00 p m  And states he had does not miss any doses  However with patient went to the hospitals for a month he did not bring any insulin  He notes he was without the insulin for the entire month  Then developed nausea, vomiting, and was seen in the ER in the hospitals, diagnosed with renal failure, and then returned to [facility]  Patient notes he is tolerating p  O  He relates he is urinating per his norm  Denies any difficulty urinating  Patient denies any bleeding  he denies any blood loss or injury in the hospitals  He denies any blood in his urine or bowel movements  He denies any melena        Physical Exam:   Temp:  [99 1 °F (37 3 °C)-100 2 °F (37 9 °C)] 99 9 °F (37 7 °C)  HR:  [77-86] 86  Resp:  [18] 18  BP: (111-131)/(68-73) 111/68  Gen:  Pleasant, non-tachypnic, non-dyspnic  Conversant  Heart: regular rate and rhythm, S1S2 present, no murmur, rub or gallop  Lungs: clear to ausculatation bilaterally  No wheezing, crackles, or rhonchi  No accessory muscle use or respiratory distress  Abd: soft, non-tender, non-distended  NABS, no guarding, rebound or peritoneal signs  Extremities: no clubbing, cyanosis or edema  2+pedal pulses bilaterally  Full range of motion  Neuro: awake, alert and oriented  Cranial nerves 2-12 intact  Strength globally intact  Fluent speech, however intermittent episodes of stuttering noted  Skin: warm and dry: no petechiae, purpura and rash  LABS:     Results from last 7 days  Lab Units 02/04/19  0521 02/03/19  0515 02/02/19  1246   WBC Thousand/uL 12 39* 16 49* 8 93   HEMOGLOBIN g/dL 7 0* 7 5* 6 0*   HEMATOCRIT % 20 4* 21 7* 17 8*   PLATELETS Thousands/uL 401* 448* 404*       Results from last 7 days  Lab Units 02/04/19  0521 02/03/19  0515 02/02/19  2355   POTASSIUM mmol/L 3 6 3 6 4 6   CHLORIDE mmol/L 108 110* 106   CO2 mmol/L 21 20* 18*   BUN mg/dL 38* 57* 61*   CREATININE mg/dL 3 48* 5 04* 5 51*   CALCIUM mg/dL 7 6* 7 3* 6 7*       Hospital Data:    2/4:  Renal ultrasound:  Echogenic renal parenchyma consistent with medical renal disease   No hydronephrosis    2/2:  CT abdomen/pelvis:  Minimal right calyceal dilatation/questionable hydronephrosis    2/2:  CT brain:  No acute abnormality    2/2:  Chest x-ray:  No acute disease    Microbiology:  2/2:  Blood culture:  Negative x2    Fecal occult blood:  Testing still pending    ---------------------------------------------------------------------------------------------------------------  This note has been constructed using a voice recognition system

## 2019-02-06 ENCOUNTER — TELEPHONE (OUTPATIENT)
Dept: NEPHROLOGY | Facility: CLINIC | Age: 49
End: 2019-02-06

## 2019-02-06 LAB
% PARASITEMIA: 0
ANION GAP SERPL CALCULATED.3IONS-SCNC: 10 MMOL/L (ref 4–13)
BASOPHILS # BLD AUTO: 0.08 THOUSANDS/ΜL (ref 0–0.1)
BASOPHILS NFR BLD AUTO: 1 % (ref 0–1)
BUN SERPL-MCNC: 20 MG/DL (ref 5–25)
CALCIUM SERPL-MCNC: 7.7 MG/DL (ref 8.3–10.1)
CHLORIDE SERPL-SCNC: 110 MMOL/L (ref 100–108)
CO2 SERPL-SCNC: 22 MMOL/L (ref 21–32)
CREAT SERPL-MCNC: 2.69 MG/DL (ref 0.6–1.3)
EOSINOPHIL # BLD AUTO: 0.58 THOUSAND/ΜL (ref 0–0.61)
EOSINOPHIL NFR BLD AUTO: 4 % (ref 0–6)
ERYTHROCYTE [DISTWIDTH] IN BLOOD BY AUTOMATED COUNT: 12.5 % (ref 11.6–15.1)
FRUCTOSAMINE SERPL-SCNC: 452 UMOL/L (ref 0–285)
GFR SERPL CREATININE-BSD FRML MDRD: 27 ML/MIN/1.73SQ M
GLUCOSE SERPL-MCNC: 102 MG/DL (ref 65–140)
GLUCOSE SERPL-MCNC: 165 MG/DL (ref 65–140)
GLUCOSE SERPL-MCNC: 278 MG/DL (ref 65–140)
GLUCOSE SERPL-MCNC: 56 MG/DL (ref 65–140)
GLUCOSE SERPL-MCNC: 77 MG/DL (ref 65–140)
HCT VFR BLD AUTO: 21.3 % (ref 36.5–49.3)
HEMOCCULT STL QL: NEGATIVE
HGB BLD-MCNC: 7.2 G/DL (ref 12–17)
IMM GRANULOCYTES # BLD AUTO: 0.14 THOUSAND/UL (ref 0–0.2)
IMM GRANULOCYTES NFR BLD AUTO: 1 % (ref 0–2)
LYMPHOCYTES # BLD AUTO: 3.02 THOUSANDS/ΜL (ref 0.6–4.47)
LYMPHOCYTES NFR BLD AUTO: 22 % (ref 14–44)
MCH RBC QN AUTO: 33.2 PG (ref 26.8–34.3)
MCHC RBC AUTO-ENTMCNC: 33.8 G/DL (ref 31.4–37.4)
MCV RBC AUTO: 98 FL (ref 82–98)
MONOCYTES # BLD AUTO: 1.17 THOUSAND/ΜL (ref 0.17–1.22)
MONOCYTES NFR BLD AUTO: 9 % (ref 4–12)
NEUTROPHILS # BLD AUTO: 8.59 THOUSANDS/ΜL (ref 1.85–7.62)
NEUTS SEG NFR BLD AUTO: 63 % (ref 43–75)
NRBC BLD AUTO-RTO: 0 /100 WBCS
PARASITE BLD: NO
PATHOLOGIST INTERPRETATION: NORMAL
PLATELET # BLD AUTO: 406 THOUSANDS/UL (ref 149–390)
PMV BLD AUTO: 10.6 FL (ref 8.9–12.7)
POTASSIUM SERPL-SCNC: 3.8 MMOL/L (ref 3.5–5.3)
RBC # BLD AUTO: 2.17 MILLION/UL (ref 3.88–5.62)
SODIUM SERPL-SCNC: 142 MMOL/L (ref 136–145)
WBC # BLD AUTO: 13.58 THOUSAND/UL (ref 4.31–10.16)

## 2019-02-06 PROCEDURE — 85025 COMPLETE CBC W/AUTO DIFF WBC: CPT | Performed by: INTERNAL MEDICINE

## 2019-02-06 PROCEDURE — 80048 BASIC METABOLIC PNL TOTAL CA: CPT | Performed by: INTERNAL MEDICINE

## 2019-02-06 PROCEDURE — 99232 SBSQ HOSP IP/OBS MODERATE 35: CPT | Performed by: INTERNAL MEDICINE

## 2019-02-06 PROCEDURE — 99253 IP/OBS CNSLTJ NEW/EST LOW 45: CPT | Performed by: INTERNAL MEDICINE

## 2019-02-06 PROCEDURE — 84166 PROTEIN E-PHORESIS/URINE/CSF: CPT | Performed by: PATHOLOGY

## 2019-02-06 PROCEDURE — 86334 IMMUNOFIX E-PHORESIS SERUM: CPT | Performed by: PATHOLOGY

## 2019-02-06 PROCEDURE — 86334 IMMUNOFIX E-PHORESIS SERUM: CPT | Performed by: INTERNAL MEDICINE

## 2019-02-06 PROCEDURE — 82272 OCCULT BLD FECES 1-3 TESTS: CPT | Performed by: INTERNAL MEDICINE

## 2019-02-06 PROCEDURE — 84165 PROTEIN E-PHORESIS SERUM: CPT | Performed by: INTERNAL MEDICINE

## 2019-02-06 PROCEDURE — 84165 PROTEIN E-PHORESIS SERUM: CPT | Performed by: PATHOLOGY

## 2019-02-06 PROCEDURE — 87207 SMEAR SPECIAL STAIN: CPT | Performed by: INTERNAL MEDICINE

## 2019-02-06 PROCEDURE — 82948 REAGENT STRIP/BLOOD GLUCOSE: CPT

## 2019-02-06 RX ORDER — INSULIN GLARGINE 100 [IU]/ML
4 INJECTION, SOLUTION SUBCUTANEOUS
Status: DISCONTINUED | OUTPATIENT
Start: 2019-02-06 | End: 2019-02-07

## 2019-02-06 RX ORDER — INSULIN GLARGINE 100 [IU]/ML
8 INJECTION, SOLUTION SUBCUTANEOUS
Status: DISCONTINUED | OUTPATIENT
Start: 2019-02-06 | End: 2019-02-06

## 2019-02-06 RX ADMIN — FOLIC ACID 1 MG: 1 TABLET ORAL at 08:43

## 2019-02-06 RX ADMIN — HEPARIN SODIUM 5000 UNITS: 5000 INJECTION INTRAVENOUS; SUBCUTANEOUS at 22:08

## 2019-02-06 RX ADMIN — PANTOPRAZOLE SODIUM 40 MG: 40 TABLET, DELAYED RELEASE ORAL at 05:47

## 2019-02-06 RX ADMIN — INSULIN LISPRO 1 UNITS: 100 INJECTION, SOLUTION INTRAVENOUS; SUBCUTANEOUS at 17:00

## 2019-02-06 RX ADMIN — Medication 100 MG: at 08:43

## 2019-02-06 RX ADMIN — HEPARIN SODIUM 5000 UNITS: 5000 INJECTION INTRAVENOUS; SUBCUTANEOUS at 14:00

## 2019-02-06 RX ADMIN — INSULIN GLARGINE 4 UNITS: 100 INJECTION, SOLUTION SUBCUTANEOUS at 22:08

## 2019-02-06 RX ADMIN — HEPARIN SODIUM 5000 UNITS: 5000 INJECTION INTRAVENOUS; SUBCUTANEOUS at 05:47

## 2019-02-06 NOTE — PLAN OF CARE

## 2019-02-06 NOTE — CONSULTS
Consultation - Medical Oncology   [facility] 50 y o  male MRN: [id number]  Unit/Bed#: [id number] Encounter: [id number]      Referring physician:  EUGENIO  Reason for Consult:  Anemia  HPI:   from the floor helped  [facility] is a 50y o  year old male who presents with nausea vomiting, dehydration, uncontrolled diabetes, hypertension and acute renal failure  Patient was found to be anemic and he has received 1 unit of blood  Patient does not have GI symptoms at present   Patient's renal functions are improving  No bleeding or blood loss history  No fevers and chills   He has lost some weight lately  Not complaining of  bone pains  He feels tired    ROS:  02/06/19 Reviewed 13 systems:  Presently no headaches, seizures, dizziness, diplopia, dysphagia, hoarseness, chest pain, palpitations, shortness of breath, cough, hemoptysis, abdominal pain, nausea, vomiting, change in bowel habits, melena, hematuria, fever, chills, bleeding, bone pains, skin rash,  arthritic symptoms,   weakness, numbness, claudication and gait problem  No frequent infections  Not unusually sensitive to heat or cold  No swelling of the ankles  No swollen glands  Patient is anxious  Other symptoms are in HPI        Historical Information   Past Medical History:   Diagnosis Date    Alcohol abuse     Chronic pancreatitis ([facility] [address] )     Diabetes mellitus (HCC)     Type 2    Hypertension     Pancreatitis     Paroxysmal A-fib (HCC)     Thrombocytopenia (HCC)      History reviewed  No pertinent surgical history    Social History   History   Alcohol Use    Yes     Comment: Drinks alot every day     History   Drug Use No     History   Smoking Status    Former Smoker   Smokeless Tobacco    Not on file     Family History:   Family History   Problem Relation Age of Onset    Diabetes Mother     Hypertension Mother     Hyperlipidemia Father          Current Facility-Administered Medications:     folic acid (Poli Fast) tablet 1 mg, 1 mg, Oral, Daily, He Becker MD, 1 mg at 02/06/19 0843    heparin (porcine) subcutaneous injection 5,000 Units, 5,000 Units, Subcutaneous, Q8H [facility], 5,000 Units at 02/06/19 0547 **AND** Platelet count, , , Once, ARIANE Disla    insulin glargine (LANTUS) subcutaneous injection 12 Units 0 12 mL, 12 Units, Subcutaneous, HS, Carmelina Arrow, DO, 12 Units at 02/05/19 2141    insulin lispro (HumaLOG) 100 units/mL subcutaneous injection 1-5 Units, 1-5 Units, Subcutaneous, TID AC, 1 Units at 02/05/19 1714 **AND** Fingerstick Glucose (POCT), , , TID AC, Jessica K ARIANE Barnes    insulin lispro (HumaLOG) 100 units/mL subcutaneous injection 3 Units, 3 Units, Subcutaneous, TID With Meals, Carmelina Arrow, DO, 3 Units at 02/05/19 1715    pantoprazole (PROTONIX) EC tablet 40 mg, 40 mg, Oral, Early Morning, He Becker MD, 40 mg at 02/06/19 0547    sodium chloride 0 9 % infusion, 100 mL/hr, Intravenous, Continuous, Relda ARIANE Henson, Last Rate: 100 mL/hr at 02/05/19 1917, 100 mL/hr at 02/05/19 1917    thiamine (VITAMIN B1) tablet 100 mg, 100 mg, Oral, Daily, He Becker MD, 100 mg at 02/06/19 0843    No Known Allergies  @ ROS@  Physical Exam:  Vitals:    02/05/19 1539 02/05/19 2300 02/06/19 0600 02/06/19 0716   BP: 122/78 121/81  117/72   BP Location: Right arm Right arm  Right arm   Pulse: 81 65  63   Resp: 18 18  18   Temp: 98 5 °F (36 9 °C) 98 1 °F (36 7 °C)  98 2 °F (36 8 °C)   TempSrc: Temporal Temporal  Temporal   SpO2: 99% 99%  99%   Weight:   67 8 kg (149 lb 7 6 oz)      Alert, oriented, not in distress, no icterus, no oral thrush, no palpable neck mass, clear lung fields, regular heart rate, abdomen  soft and non tender, no palpable abdominal mass, no ascites, no edema of ankles, no calf tenderness, no focal neurological deficit, no skin rash, no palpable lymphadenopathy, good arterial pulses, no clubbing  Patient is anxious  Performance status 1        Lab Results: I have reviewed all pertinent labs  LABS:  Results for orders placed or performed during the hospital encounter of 02/02/19   Blood culture #1   Result Value Ref Range    Blood Culture No Growth at 72 hrs  Blood culture #2   Result Value Ref Range    Blood Culture No Growth at 72 hrs      Comprehensive metabolic panel   Result Value Ref Range    Sodium 122 (L) 136 - 145 mmol/L    Potassium 5 1 3 5 - 5 3 mmol/L    Chloride 90 (L) 100 - 108 mmol/L    CO2 18 (L) 21 - 32 mmol/L    ANION GAP 14 (H) 4 - 13 mmol/L    BUN 78 (H) 5 - 25 mg/dL    Creatinine 7 65 (H) 0 60 - 1 30 mg/dL    Glucose 856 (HH) 65 - 140 mg/dL    Calcium 7 1 (L) 8 3 - 10 1 mg/dL    AST 18 5 - 45 U/L    ALT 37 12 - 78 U/L    Alkaline Phosphatase 74 46 - 116 U/L    Total Protein 6 1 (L) 6 4 - 8 2 g/dL    Albumin 3 0 (L) 3 5 - 5 0 g/dL    Total Bilirubin 0 47 0 20 - 1 00 mg/dL    eGFR 8 ml/min/1 73sq m   CBC and differential   Result Value Ref Range    WBC 8 93 4 31 - 10 16 Thousand/uL    RBC 1 77 (L) 3 88 - 5 62 Million/uL    Hemoglobin 6 0 (LL) 12 0 - 17 0 g/dL    Hematocrit 17 8 (L) 36 5 - 49 3 %     (H) 82 - 98 fL    MCH 33 9 26 8 - 34 3 pg    MCHC 33 7 31 4 - 37 4 g/dL    RDW 11 6 11 6 - 15 1 %    MPV 11 4 8 9 - 12 7 fL    Platelets 821 (H) 211 - 390 Thousands/uL    nRBC 0 /100 WBCs    Neutrophils Relative 72 43 - 75 %    Immat GRANS % 1 0 - 2 %    Lymphocytes Relative 13 (L) 14 - 44 %    Monocytes Relative 11 4 - 12 %    Eosinophils Relative 3 0 - 6 %    Basophils Relative 0 0 - 1 %    Neutrophils Absolute 6 37 1 85 - 7 62 Thousands/µL    Immature Grans Absolute 0 12 0 00 - 0 20 Thousand/uL    Lymphocytes Absolute 1 16 0 60 - 4 47 Thousands/µL    Monocytes Absolute 0 97 0 17 - 1 22 Thousand/µL    Eosinophils Absolute 0 27 0 00 - 0 61 Thousand/µL    Basophils Absolute 0 04 0 00 - 0 10 Thousands/µL   Troponin I   Result Value Ref Range    Troponin I <0 02 <=0 04 ng/mL   Lactic Acid x2   Result Value Ref Range    LACTIC ACID 3 1 (HH) 0 5 - 2 0 mmol/L   Lactic Acid x2   Result Value Ref Range    LACTIC ACID 2 5 (HH) 0 5 - 2 0 mmol/L   Protime-INR   Result Value Ref Range    Protime 13 4 11 8 - 14 2 seconds    INR 1 01 0 86 - 1 17   APTT   Result Value Ref Range    PTT 31 26 - 38 seconds   Procalcitonin   Result Value Ref Range    Procalcitonin 0 42 (H) <=0 25 ng/ml   Lipase   Result Value Ref Range    Lipase 29 (L) 73 - 393 u/L   Blood gas, venous   Result Value Ref Range    pH, Sami 7 316 7 300 - 7 400    pCO2, Sami 35 3 (L) 42 0 - 50 0 mm Hg    pO2, Sami 41 7 35 0 - 45 0 mm Hg    HCO3, Sami 17 6 (L) 24 - 30 mmol/L    Base Excess, Sami -7 8 mmol/L    O2 Content, Sami 7 2 ml/dL    O2 HGB, VENOUS 73 2 60 0 - 80 0 %   Acetone   Result Value Ref Range    Acetone, Bld Negative Negative   Urinalysis with reflex to microscopic   Result Value Ref Range    Color, UA Yellow     Clarity, UA Clear     Specific [place], UA 1 010 1 003 - 1 030    pH, UA 5 0 4 5 - 8 0    Leukocytes, UA (A) Negative     Elevated glucose may cause decreased leukocyte values   See urine microscopic for [facility] result/    Nitrite, UA Negative Negative    Protein, UA Negative Negative mg/dl    Glucose, UA >=1000 (1%) (A) Negative mg/dl    Ketones, UA Negative Negative mg/dl    Urobilinogen, UA 0 2 0 2, 1 0 E U /dl E U /dl    Bilirubin, UA Negative Negative    Blood, UA Trace-lysed (A) Negative   Osmolality   Result Value Ref Range    Osmolality Serum 326 (H) 282 - 298 mmol/KG   Urine Microscopic   Result Value Ref Range    RBC, UA None Seen None Seen, 0-5 /hpf    WBC, UA 2-4 (A) None Seen, 0-5, 5-55, 5-65 /hpf    Epithelial Cells Occasional None Seen, Occasional /hpf    Bacteria, UA None Seen None Seen, Occasional /hpf   Beta Hydroxybutyrate   Result Value Ref Range    BETA-HYDROXYBUTYRATE 0 16 0 02 - 0 27 mmol/L   Basic metabolic panel   Result Value Ref Range    Sodium 137 136 - 145 mmol/L    Potassium 3 5 3 5 - 5 3 mmol/L    Chloride 103 100 - 108 mmol/L    CO2 19 (L) 21 - 32 mmol/L    ANION GAP 15 (H) 4 - 13 mmol/L    BUN 69 (H) 5 - 25 mg/dL    Creatinine 6 58 (H) 0 60 - 1 30 mg/dL    Glucose 115 65 - 140 mg/dL    Calcium 7 2 (L) 8 3 - 10 1 mg/dL    eGFR 9 ml/min/1 73sq m   Basic metabolic panel   Result Value Ref Range    Sodium 136 136 - 145 mmol/L    Potassium 4 6 3 5 - 5 3 mmol/L    Chloride 106 100 - 108 mmol/L    CO2 18 (L) 21 - 32 mmol/L    ANION GAP 12 4 - 13 mmol/L    BUN 61 (H) 5 - 25 mg/dL    Creatinine 5 51 (H) 0 60 - 1 30 mg/dL    Glucose 314 (H) 65 - 140 mg/dL    Calcium 6 7 (L) 8 3 - 10 1 mg/dL    eGFR 11 ml/min/1 73sq m   CK (with reflex to MB)   Result Value Ref Range    Total CK 22 (L) 39 - 308 U/L   Hemolysis Smear   Result Value Ref Range    Hemolysis Smear No Schistocytes or Helmet Cells noted    Haptoglobin   Result Value Ref Range    Haptoglobin 47 34 - 200 mg/dL   Lactate dehydrogenase   Result Value Ref Range     (H) 81 - 234 U/L   Retic Count   Result Value Ref Range    Retic Ct Abs 40,800 14,356-105,094    Retic Ct Pct 1 88 (H) 0 37 - 1 87 %   Vitamin B12   Result Value Ref Range    Vitamin B-12 1,010 (H) 100 - 900 pg/mL   Folate   Result Value Ref Range    Folate 14 5 3 1 - 17 5 ng/mL   Magnesium   Result Value Ref Range    Magnesium 1 3 (L) 1 6 - 2 6 mg/dL   Phosphorus   Result Value Ref Range    Phosphorus 4 5 2 7 - 4 5 mg/dL   CBC   Result Value Ref Range    WBC 16 49 (H) 4 31 - 10 16 Thousand/uL    RBC 2 24 (L) 3 88 - 5 62 Million/uL    Hemoglobin 7 5 (L) 12 0 - 17 0 g/dL    Hematocrit 21 7 (L) 36 5 - 49 3 %    MCV 97 82 - 98 fL    MCH 33 5 26 8 - 34 3 pg    MCHC 34 6 31 4 - 37 4 g/dL    RDW 12 6 11 6 - 15 1 %    Platelets 128 (H) 007 - 390 Thousands/uL    MPV 9 8 8 9 - 12 7 fL   Basic metabolic panel   Result Value Ref Range    Sodium 142 136 - 145 mmol/L    Potassium 3 6 3 5 - 5 3 mmol/L    Chloride 110 (H) 100 - 108 mmol/L    CO2 20 (L) 21 - 32 mmol/L    ANION GAP 12 4 - 13 mmol/L    BUN 57 (H) 5 - 25 mg/dL    Creatinine 5 04 (H) 0 60 - 1 30 mg/dL    Glucose 40 (L) 65 - 140 mg/dL    Calcium 7 3 (L) 8 3 - 10 1 mg/dL    eGFR 13 ml/min/1 73sq m   Magnesium   Result Value Ref Range    Magnesium 3 3 (H) 1 6 - 2 6 mg/dL   Sodium, urine, random   Result Value Ref Range    Sodium, Ur 88    Creatinine, urine, random   Result Value Ref Range    Creatinine, Ur 28 2 mg/dL   Magnesium   Result Value Ref Range    Magnesium 1 7 1 6 - 2 6 mg/dL   Phosphorus   Result Value Ref Range    Phosphorus 4 5 2 7 - 4 5 mg/dL   Osmolality   Result Value Ref Range    Osmolality Serum 292 282 - 298 mmol/KG   CBC and differential   Result Value Ref Range    WBC 12 39 (H) 4 31 - 10 16 Thousand/uL    RBC 2 12 (L) 3 88 - 5 62 Million/uL    Hemoglobin 7 0 (L) 12 0 - 17 0 g/dL    Hematocrit 20 4 (L) 36 5 - 49 3 %    MCV 96 82 - 98 fL    MCH 33 0 26 8 - 34 3 pg    MCHC 34 3 31 4 - 37 4 g/dL    RDW 12 7 11 6 - 15 1 %    MPV 10 9 8 9 - 12 7 fL    Platelets 974 (H) 834 - 390 Thousands/uL    nRBC 0 /100 WBCs    Neutrophils Relative 72 43 - 75 %    Immat GRANS % 1 0 - 2 %    Lymphocytes Relative 17 14 - 44 %    Monocytes Relative 6 4 - 12 %    Eosinophils Relative 3 0 - 6 %    Basophils Relative 1 0 - 1 %    Neutrophils Absolute 8 90 (H) 1 85 - 7 62 Thousands/µL    Immature Grans Absolute 0 14 0 00 - 0 20 Thousand/uL    Lymphocytes Absolute 2 13 0 60 - 4 47 Thousands/µL    Monocytes Absolute 0 79 0 17 - 1 22 Thousand/µL    Eosinophils Absolute 0 36 0 00 - 0 61 Thousand/µL    Basophils Absolute 0 07 0 00 - 0 10 Thousands/µL   Basic metabolic panel   Result Value Ref Range    Sodium 140 136 - 145 mmol/L    Potassium 3 6 3 5 - 5 3 mmol/L    Chloride 108 100 - 108 mmol/L    CO2 21 21 - 32 mmol/L    ANION GAP 11 4 - 13 mmol/L    BUN 38 (H) 5 - 25 mg/dL    Creatinine 3 48 (H) 0 60 - 1 30 mg/dL    Glucose 71 65 - 140 mg/dL    Calcium 7 6 (L) 8 3 - 10 1 mg/dL    eGFR 20 ml/min/1 73sq m   Fructosamine   Result Value Ref Range    Fructosamine 452 (H) 0 - 285 umol/L   Basic metabolic panel   Result Value Ref Range    Sodium 140 136 - 145 mmol/L    Potassium 3 8 3 5 - 5 3 mmol/L    Chloride 110 (H) 100 - 108 mmol/L    CO2 21 21 - 32 mmol/L    ANION GAP 9 4 - 13 mmol/L    BUN 26 (H) 5 - 25 mg/dL    Creatinine 2 80 (H) 0 60 - 1 30 mg/dL    Glucose 97 65 - 140 mg/dL    Calcium 7 5 (L) 8 3 - 10 1 mg/dL    eGFR 26 ml/min/1 73sq m   Iron   Result Value Ref Range    Iron 195 (H) 65 - 175 ug/dL   Ferritin   Result Value Ref Range    Ferritin 1,375 (H) 8 - 388 ng/mL   TIBC   Result Value Ref Range    TIBC 191 (L) 250 - 450 ug/dL   CBC and differential   Result Value Ref Range    WBC 12 66 (H) 4 31 - 10 16 Thousand/uL    RBC 2 20 (L) 3 88 - 5 62 Million/uL    Hemoglobin 7 3 (L) 12 0 - 17 0 g/dL    Hematocrit 21 5 (L) 36 5 - 49 3 %    MCV 98 82 - 98 fL    MCH 33 2 26 8 - 34 3 pg    MCHC 34 0 31 4 - 37 4 g/dL    RDW 12 6 11 6 - 15 1 %    MPV 10 6 8 9 - 12 7 fL    Platelets 227 (H) 136 - 390 Thousands/uL    nRBC 0 /100 WBCs    Neutrophils Relative 62 43 - 75 %    Immat GRANS % 1 0 - 2 %    Lymphocytes Relative 25 14 - 44 %    Monocytes Relative 7 4 - 12 %    Eosinophils Relative 4 0 - 6 %    Basophils Relative 1 0 - 1 %    Neutrophils Absolute 7 94 (H) 1 85 - 7 62 Thousands/µL    Immature Grans Absolute 0 15 0 00 - 0 20 Thousand/uL    Lymphocytes Absolute 3 13 0 60 - 4 47 Thousands/µL    Monocytes Absolute 0 88 0 17 - 1 22 Thousand/µL    Eosinophils Absolute 0 50 0 00 - 0 61 Thousand/µL    Basophils Absolute 0 06 0 00 - 0 10 Thousands/µL   Basic metabolic panel   Result Value Ref Range    Sodium 142 136 - 145 mmol/L    Potassium 3 8 3 5 - 5 3 mmol/L    Chloride 110 (H) 100 - 108 mmol/L    CO2 22 21 - 32 mmol/L    ANION GAP 10 4 - 13 mmol/L    BUN 20 5 - 25 mg/dL    Creatinine 2 69 (H) 0 60 - 1 30 mg/dL    Glucose 56 (L) 65 - 140 mg/dL    Calcium 7 7 (L) 8 3 - 10 1 mg/dL    eGFR 27 ml/min/1 73sq m   CBC and differential   Result Value Ref Range    WBC 13 58 (H) 4 31 - 10 16 Thousand/uL    RBC 2 17 (L) 3 88 - 5 62 Million/uL    Hemoglobin 7 2 (L) 12 0 - 17 0 g/dL    Hematocrit 21 3 (L) 36 5 - 49 3 %    MCV 98 82 - 98 fL    MCH 33 2 26 8 - 34 3 pg    MCHC 33 8 31 4 - 37 4 g/dL    RDW 12 5 11 6 - 15 1 %    MPV 10 6 8 9 - 12 7 fL    Platelets 423 (H) 316 - 390 Thousands/uL    nRBC 0 /100 WBCs    Neutrophils Relative 63 43 - 75 %    Immat GRANS % 1 0 - 2 %    Lymphocytes Relative 22 14 - 44 %    Monocytes Relative 9 4 - 12 %    Eosinophils Relative 4 0 - 6 %    Basophils Relative 1 0 - 1 %    Neutrophils Absolute 8 59 (H) 1 85 - 7 62 Thousands/µL    Immature Grans Absolute 0 14 0 00 - 0 20 Thousand/uL    Lymphocytes Absolute 3 02 0 60 - 4 47 Thousands/µL    Monocytes Absolute 1 17 0 17 - 1 22 Thousand/µL    Eosinophils Absolute 0 58 0 00 - 0 61 Thousand/µL    Basophils Absolute 0 08 0 00 - 0 10 Thousands/µL   Type and screen   Result Value Ref Range    ABO Grouping A     Rh Factor Positive     Antibody Screen Negative     Specimen Expiration Date [id number]    Prepare RBC:Special Requirements: None; Has consent been obtained? Yes; Where is the Surgery Scheduled?  [employer], 1 Units   Result Value Ref Range    Unit Product Code K9451W61     Unit Number [id number]     Unit ABO A     Unit [facility] POS     Crossmatch Compatible     Unit Dispense Status Presumed Trans    Type and screen   Result Value Ref Range    ABO Grouping A     Rh Factor Positive     Antibody Screen Negative     Specimen Expiration Date [id number]    Fingerstick Glucose (POCT)   Result Value Ref Range    POC Glucose >500 (HH) 65 - 140 mg/dl   Fingerstick Glucose (POCT)   Result Value Ref Range    POC Glucose >500 (HH) 65 - 140 mg/dl   Fingerstick Glucose (POCT)   Result Value Ref Range    POC Glucose >500 (HH) 65 - 140 mg/dl   Fingerstick Glucose (POCT)   Result Value Ref Range    POC Glucose 448 (H) 65 - 140 mg/dl   Fingerstick Glucose (POCT)   Result Value Ref Range    POC Glucose 353 (H) 65 - 140 mg/dl   Fingerstick Glucose (POCT)   Result Value Ref Range    POC Glucose 72 65 - 140 mg/dl Fingerstick Glucose (POCT)   Result Value Ref Range    POC Glucose 36 (LL) 65 - 140 mg/dl   Fingerstick Glucose (POCT)   Result Value Ref Range    POC Glucose 135 65 - 140 mg/dl   Fingerstick Glucose (POCT)   Result Value Ref Range    POC Glucose 94 65 - 140 mg/dl   Fingerstick Glucose (POCT)   Result Value Ref Range    POC Glucose 129 65 - 140 mg/dl   Fingerstick Glucose (POCT)   Result Value Ref Range    POC Glucose 157 (H) 65 - 140 mg/dl   Fingerstick Glucose (POCT)   Result Value Ref Range    POC Glucose 202 (H) 65 - 140 mg/dl   Fingerstick Glucose (POCT)   Result Value Ref Range    POC Glucose 295 (H) 65 - 140 mg/dl   Fingerstick Glucose (POCT)   Result Value Ref Range    POC Glucose 301 (H) 65 - 140 mg/dl   Fingerstick Glucose (POCT)   Result Value Ref Range    POC Glucose 178 (H) 65 - 140 mg/dl   Fingerstick Glucose (POCT)   Result Value Ref Range    POC Glucose 37 (LL) 65 - 140 mg/dl   Fingerstick Glucose (POCT)   Result Value Ref Range    POC Glucose 37 (LL) 65 - 140 mg/dl   Fingerstick Glucose (POCT)   Result Value Ref Range    POC Glucose 114 65 - 140 mg/dl   Fingerstick Glucose (POCT)   Result Value Ref Range    POC Glucose 252 (H) 65 - 140 mg/dl   Fingerstick Glucose (POCT)   Result Value Ref Range    POC Glucose 305 (H) 65 - 140 mg/dl   Fingerstick Glucose (POCT)   Result Value Ref Range    POC Glucose 135 65 - 140 mg/dl   Fingerstick Glucose (POCT)   Result Value Ref Range    POC Glucose 185 (H) 65 - 140 mg/dl   Fingerstick Glucose (POCT)   Result Value Ref Range    POC Glucose 179 (H) 65 - 140 mg/dl   Fingerstick Glucose (POCT)   Result Value Ref Range    POC Glucose 37 (LL) 65 - 140 mg/dl   Fingerstick Glucose (POCT)   Result Value Ref Range    POC Glucose 113 65 - 140 mg/dl   Fingerstick Glucose (POCT)   Result Value Ref Range    POC Glucose 251 (H) 65 - 140 mg/dl   Fingerstick Glucose (POCT)   Result Value Ref Range    POC Glucose 214 (H) 65 - 140 mg/dl   Fingerstick Glucose (POCT)   Result Value Ref Range    POC Glucose 74 65 - 140 mg/dl   Fingerstick Glucose (POCT)   Result Value Ref Range    POC Glucose 117 65 - 140 mg/dl   Fingerstick Glucose (POCT)   Result Value Ref Range    POC Glucose 158 (H) 65 - 140 mg/dl   Fingerstick Glucose (POCT)   Result Value Ref Range    POC Glucose 191 (H) 65 - 140 mg/dl   Fingerstick Glucose (POCT)   Result Value Ref Range    POC Glucose 243 (H) 65 - 140 mg/dl   Fingerstick Glucose (POCT)   Result Value Ref Range    POC Glucose 77 65 - 140 mg/dl         Imaging Studies: I have personally reviewed pertinent reports  IMPRESSION:     Minimal right calyceal dilatation/questionable hydronephrosis              Workstation performed: [id number]      Imaging     CT abdomen pelvis wo contrast (Order #[id number]) on 2/2/2019 - Imaging Information  IMPRESSION:     No acute cardiopulmonary disease            Workstation performed: [id number]      Imaging     XR chest portable (Order #[id number]) on 2/2/2019 - Imaging Information     Pathology, and Other Studies: I have personally reviewed pertinent reports  Assessment and Plan:  Reviewed and discussed patient's symptoms and signs and test results  Normocytic normochromic anemia  Leukocytosis is improving  Mild thrombocytosis  Renal failure secondary to diabetes and hypertension and dehydration and renal functions are improving  Protein studies are pending to rule out multiple myeloma  To check reticulocyte and stool heme test   To monitor blood count Discussed possibility of bone marrow test and explained the procedure   All discussed in detail  Questions answered    Patient voiced understanding and agreement in the discussion  Counseling / Coordination of Care    Greater than 50% of total time was spent with the patient and / or family counseling and / or coordination of care

## 2019-02-06 NOTE — PROGRESS NOTES
NEPHROLOGY PROGRESS NOTE   Jose De Jesus Gone 50 y o  male MRN: [id number]  Unit/Bed#: [place] [address] 2 [address] 221-01 Encounter: [id number]      ASSESSMENT & PLAN:  1  Acute kidney injury on top of CKD given proteinuria back since 2015, suspected multifactorial:  secondary to pre renal component in the setting of uncontrolled glycemia, nausea, vomiting and diarrhea, as well as severe anemia that likely progressed to ATN  Previous creatinine 0 77 back on April 2018  Serum creatinine on admission 7 65  Renal function improving, serum creatinine down to 2 69 today  Avoid hypotension, avoid nephrotoxins  No NSAIDs after discharge, patient reported was taking Motrin for back pain once a week before this admission  With arrange for outpatient follow-up with nephrologist in 2-4 weeks with repeat labs  Discussed with patient about importance of having good blood sugar control and also to avoid NSAIDs  Number okay from renal standpoint of view to be discharged  2  Type 2 diabetes with hyperglycemia  HHNK versus DKA, continue management per primary team   Previously on insulin drip  Endocrinology on board managing diabetes    3  Anion gap metabolic acidosis, lactic acidosis  Improved with medical treatment  4  Pseudohyponatremia admission when corrected for hyperglycemia  Currently serum sodium is stable and normalized  5  Severe anemia, status post transfusion  Monitor H&H and transfusion as needed  Hematology consulted  6  Reported mild right calyceal dilation/questionable hydronephrosis on CT scan  Renal ultrasound did not show any hydronephrosis  Discussed with Dr Daphney Meza from Internal Medicine team       SUBJECTIVE:  Patient seen and examined, no complaints, denies shortness of breath or chest pain, denies urinary problems      OBJECTIVE:  Current Weight: Weight - Scale: 67 8 kg (149 lb 7 6 oz)  Vitals:    02/06/19 0716   BP: 117/72   Pulse: 63   Resp: 18   Temp: 98 2 °F (36 8 °C)   SpO2: 99% Intake/Output Summary (Last 24 hours) at 02/06/19 0910  Last data filed at 02/06/19 0600   Gross per 24 hour   Intake                0 ml   Output             1000 ml   Net            -1000 ml     General: conscious, cooperative, in not acute distress  Eyes: conjunctivae pink, anicteric sclerae  ENT: lips and mucous membranes moist  Neck: supple, no JVD  Chest: clear breath sounds bilateral, no crackles, ronchus or wheezings  CVS: distinct S1 & S2, normal rate, regular rhythm  Abdomen: soft, non-tender, non-distended, normoactive bowel sounds  Extremities: no edema of both legs  Skin: no rash  Neuro: awake, alert, oriented      Medications:    Current Facility-Administered Medications:     folic acid (FOLVITE) tablet 1 mg, 1 mg, Oral, Daily, Tiffani Leiva MD, 1 mg at 02/06/19 0843    heparin (porcine) subcutaneous injection 5,000 Units, 5,000 Units, Subcutaneous, Q8H [facility], 5,000 Units at 02/06/19 0547 **AND** Platelet count, , , Once, ARIANE Gan    insulin glargine (LANTUS) subcutaneous injection 12 Units 0 12 mL, 12 Units, Subcutaneous, HS, [facility], DO, 12 Units at 02/05/19 2141    insulin lispro (HumaLOG) 100 units/mL subcutaneous injection 1-5 Units, 1-5 Units, Subcutaneous, TID AC, 1 Units at 02/05/19 1714 **AND** Fingerstick Glucose (POCT), , , TID AC, ARIANE Lopez    insulin lispro (HumaLOG) 100 units/mL subcutaneous injection 3 Units, 3 Units, Subcutaneous, TID With Meals, [facility], DO, 3 Units at 02/05/19 1715    pantoprazole (PROTONIX) EC tablet 40 mg, 40 mg, Oral, Early Morning, Tiffani Leiva MD, 40 mg at 02/06/19 0547    sodium chloride 0 9 % infusion, 100 mL/hr, Intravenous, Continuous, ARIANE Gaitan Se, Last Rate: 100 mL/hr at 02/05/19 1917, 100 mL/hr at 02/05/19 1917    thiamine (VITAMIN B1) tablet 100 mg, 100 mg, Oral, Daily, Tiffani Leiva MD, 100 mg at 02/06/19 0832    Invasive Devices:        Lab Results:     Results from last 7 days  Lab Units 02/06/19  0556 02/05/19  0437 02/04/19  0521 02/03/19  0515  02/02/19  1723 02/02/19  1246   WBC Thousand/uL 13 58* 12 66* 12 39* 16 49*  --   --  8 93   HEMOGLOBIN g/dL 7 2* 7 3* 7 0* 7 5*  --   --  6 0*   HEMATOCRIT % 21 3* 21 5* 20 4* 21 7*  --   --  17 8*   PLATELETS Thousands/uL 406* 391* 401* 448*  --   --  404*   SODIUM mmol/L 142 140 140 142  < > 137 122*   POTASSIUM mmol/L 3 8 3 8 3 6 3 6  < > 3 5 5 1   CHLORIDE mmol/L 110* 110* 108 110*  < > 103 90*   CO2 mmol/L 22 21 21 20*  < > 19* 18*   BUN mg/dL 20 26* 38* 57*  < > 69* 78*   CREATININE mg/dL 2 69* 2 80* 3 48* 5 04*  < > 6 58* 7 65*   CALCIUM mg/dL 7 7* 7 5* 7 6* 7 3*  < > 7 2* 7 1*   MAGNESIUM mg/dL  --   --  1 7 3 3*  --  1 3*  --    PHOSPHORUS mg/dL  --   --  4 5  --   --  4 5  --    ALK PHOS U/L  --   --   --   --   --   --  74   ALT U/L  --   --   --   --   --   --  37   AST U/L  --   --   --   --   --   --  18   < > = values in this interval not displayed  Previous work up:  CT scan of the abdomen and pelvis reported as mild right calyceal dilation/questionable hydronephrosis  Renal ultrasound reported as echogenic bilateral renal parenchyma consistent with medical renal disease, no hydronephrosis      Portions of the record may have been created with voice recognition software  Occasional wrong word or "sound a like" substitutions may have occurred due to the inherent limitations of voice recognition software  Read the chart carefully and recognize, using context, where substitutions have occurred  If you have any questions, please contact the dictating provider

## 2019-02-06 NOTE — SOCIAL WORK
CM met with patient at bedside to address discharge needs  CM pointed out name/number on the white board and communicated she was that individual      Patient lives with his elderly parents in a 2nd floor apartment  Patient denies difficulty with steps  Patient is independent with ADLs and functional mobility  Food shopping & meal prep is done by family  Patient does not use any DMEs  Patient is able to ambulate without assistance from a seated or laying position  No hx of VNA reported  Patient is not involved in any community activities  Patient is employed  PCP identified as the clinic at [facility] [employer]  No POA identified as children are in [place], and there is no spouse  Patient uses [address] for Rx needs  Patient does not drive; reports friends are available to transport home  Is this not a readmission within the last 30 days  CM will remain available and follow as needed  CM also encouraged patient to follow up with all/any recommended appointments after discharge  Patient advised of importance for patient and family to participate in managing patients medical well being

## 2019-02-06 NOTE — PROGRESS NOTES
Reviewed blood sugars from past 24 hours  Hypoglycemia this morning noted  Will decrease Lantus to 8 units q h s  Decrease Humalog to 2 units when eating  Continue scale for correction  Monitor closely for hypoglycemia

## 2019-02-06 NOTE — PROGRESS NOTES
Progress Note - Angeline Jaquez 50 y o  male MRN: [id number]    Unit/Bed#: [place] [address] -01 Encounter: [id number]      Assessment/Plan:  1-acute kidney injury:  Patient presented with acute kidney injury with a creatinine of 7 65 on admission   His previous creatinine had been 1 16 in 2016               -patient was evaluated by the nephrology service  [facility] acute kidney injury was felt to be multifactorial, secondary to prerenal, from hyperglycemia, nausea/vomiting/ diarrhea causing volume depletion, plus severe anemia, that then progressed to ATN              -WXSW hydrochlorothiazide and lisinopril were held              -creatinine improved to 2 69   -will follow up with Nephrology service as an outpatient     2-anemia:  Patient presented with acute anemia with a hemoglobin of 6  His previous hemoglobin have been 14 in April 2018                -he denies any recent injury or blood loss   Denies any hematuria, hematochezia, or melena               -A82 and folic acid levels appropriate              -iron and ferritin wnl              -Hemoccult stool negative              -negative schistocytes   Haptoglobin the normal   LDH only mildly elevated               -patient had a precipitous drop in his hemoglobin from 14 down to 6   Not likely secondary to chronic kidney disease alone                 -check spep and upep   -parasite smear negative              -appreciate Hematology evaluation:  Bone marrow biopsy scheduled for tomorrow with Interventional Radiology     3-HHNK:  diabetes type 2, with hyperglycemia:  With long-term use of insulin   Without complication               -ARDEN is a history of type 2 diabetes for the past 14 years  [facility] has been on Lantus 25 units q h s, as well as metformin 1000 mg b i d     This was confirmed in his [facility] records as far back as November 2016               -XPPHIYK however states that he was in the [facility] for a month, and did not bring his insulin with him  Humberto Ruelas was without his insulin for a month, prior to becoming ill in presenting to the ER in the [facility]               -which he returned [place], patient presented to the ER with hyperglycemia, with a blood sugar of 853  He was admitted to the critical care service, started on an insulin infusion               -patient improved, and was taken off the infusion and started on Lantus insulin, and sliding scale Humalog               -evaluated by the Endocrinology service  Darrius Good was noted the patient chronic pancreatitis findings on CT scan, therefore patient may respond more like a type 1 diabetes patient, and will continue to require insulin at discharge              -insulin regimen titrated by endocrinologist today: Their assistance is appreciated    Blood sugars were 77, 102, 165   -Lantus was decreased to 8 units for this evening, however as patient will be NPO for bone marrow biopsy, will give only 50% of this scheduled dose for tonight     4-pseudohyponatremia:  Secondary to hyperglycemia   Currently normalized, with improvement in the hyperglycemia      5-questionable hydronephrosis:  On initial CT scan   However renal ultrasound without any evidence of hydronephrosis or stone   Medical renal disease noted      6-alcohol abuse:  Patient's family reported to the staff that patient drinks rum daily  Justine Clock are concerned about his alcohol intake   Patient's outpatient records note history of alcohol abuse from 2016                -will start thiamine and folic acid               -patient is currently at hospital day 4,  No current signs or symptoms of withdrawal     7-lactic acidosis:  Likely secondary to volume depletion and acute kidney injury      8-essential hypertension:  Patient has a history of essential hypertension   Was on HCTZ and ACE inhibitor as an outpatient               -currently normotensive:  monitor off medication     9-history of atrial fibrillation: per old records   In [facility] [employer] to monitor      10-history pancreatitis:  Possibly secondary to reported history of alcohol abuse     11-status for altered mental status:  Patient with reported altered mental status initially   His CT scan of the brain did not have any acute abnormalities   He did not have any focal neurologic deficits on exam               -currently awake, alert, oriented   No focal neurologic deficits   Patient does, at times, have a stuttering speech, however it is fluent and other times   Patient indicates that this is his baseline  Discussed with patient's father, and he confirms that patient appears at his baseline mentally, and from a speech standpoint              -previous altered mental status secondary to toxic metabolic encephalopathy from his HHNK     12-leukocytosis:  Pt with initial leukocytosis of 16 4  Improved to 12 without antibiotic intervention  Patient currently afebrile  Denies any signs or symptoms of infection  Lungs are clear  No bandemia  Likely reactive secondary to [address]   -discussed with infectious disease specialist:  Leukocytosis may be reactive also secondary to his anemia  If patient's parasite smear is unremarkable, and he is not having fevers, no need to repeat parasite smears x3  Unlikely an active infection involved        Discussed with Dr Stephanie Heaton  Discussed with Dr Mosquera Corners     Marrow biopsy tomorrow:  Possible DC after bone marrow biopsy with outpatient follow-up     VTE Pharmacologic Prophylaxis: Heparin  VTE Mechanical Prophylaxis: sequential compression device      Certification Statement: The patient will continue to require additional inpatient hospital stay due to need for further acute intervention for anemia, acute kidney injury    Status: inpatient     ===================================================================    Subjective:  Patient is examined and interviewed by me in Kyr[place]  He denies any complaints at all  He denies any pain anywhere    He denies any shortness of breath or cough  He denies any nausea, vomiting, diarrhea  He is tolerating p  O  He denies any dizziness or lightheadedness  Patient relates he wishes to shower  He notes he is agreeable to the bone marrow biopsy tomorrow        Physical Exam:   Temp:  [98 1 °F (36 7 °C)-99 8 °F (37 7 °C)] 99 8 °F (37 7 °C)  HR:  [63-65] 63  Resp:  [18] 18  BP: (117-121)/(66-81) 118/66    Gen:  Pleasant, non-tachypnic, non-dyspnic  Conversant  Heart: regular rate and rhythm, S1S2 present, no murmur, rub or gallop  Lungs: clear to ausculatation bilaterally  No wheezing, crackles, or rhonchi  No accessory muscle use or respiratory distress  Abd: soft, non-tender, non-distended  NABS, no guarding, rebound or peritoneal signs  Extremities: no clubbing, cyanosis or edema  2+pedal pulses bilaterally  Full range of motion  Neuro: awake, alert  Communicative  Moving all 4 extremities  Fluent speech at times, stuttering and halting speech noted intermittently  (baseline, per his father)  Skin: warm and dry: no petechiae, purpura and rash      LABS:     Results from last 7 days  Lab Units 02/06/19  0556 02/05/19 0437 02/04/19  0521   WBC Thousand/uL 13 58* 12 66* 12 39*   HEMOGLOBIN g/dL 7 2* 7 3* 7 0*   HEMATOCRIT % 21 3* 21 5* 20 4*   PLATELETS Thousands/uL 406* 391* 401*       Results from last 7 days  Lab Units 02/06/19  0556 02/05/19 0437 02/04/19  0521   POTASSIUM mmol/L 3 8 3 8 3 6   CHLORIDE mmol/L 110* 110* 108   CO2 mmol/L 22 21 21   BUN mg/dL 20 26* 38*   CREATININE mg/dL 2 69* 2 80* 3 48*   CALCIUM mg/dL 7 7* 7 5* 7 6*       Hospital Data:    2/4:  Renal ultrasound:  Echogenic renal parenchyma consistent with medical renal disease   No hydronephrosis     2/2:  CT abdomen/pelvis:  Minimal right calyceal dilatation/questionable hydronephrosis     2/2:  CT brain:  No acute abnormality     2/2:  Chest x-ray:  No acute disease     Microbiology:  2/2:  Blood culture:  Negative x2     Fecal occult blood:  Testing still pending  Parasite smear:  Negative        ---------------------------------------------------------------------------------------------------------------  This note has been constructed using a voice recognition system

## 2019-02-07 ENCOUNTER — TELEPHONE (OUTPATIENT)
Dept: NEPHROLOGY | Facility: CLINIC | Age: 49
End: 2019-02-07

## 2019-02-07 ENCOUNTER — APPOINTMENT (INPATIENT)
Dept: CT IMAGING | Facility: HOSPITAL | Age: 49
DRG: 469 | End: 2019-02-07
Attending: INTERNAL MEDICINE
Payer: COMMERCIAL

## 2019-02-07 LAB
ABO GROUP BLD BPU: NORMAL
ANION GAP SERPL CALCULATED.3IONS-SCNC: 12 MMOL/L (ref 4–13)
BACTERIA BLD CULT: NORMAL
BACTERIA BLD CULT: NORMAL
BASOPHILS # BLD AUTO: 0.04 THOUSANDS/ΜL (ref 0–0.1)
BASOPHILS NFR BLD AUTO: 0 % (ref 0–1)
BPU ID: NORMAL
BUN SERPL-MCNC: 14 MG/DL (ref 5–25)
CALCIUM SERPL-MCNC: 7.6 MG/DL (ref 8.3–10.1)
CHLORIDE SERPL-SCNC: 110 MMOL/L (ref 100–108)
CO2 SERPL-SCNC: 20 MMOL/L (ref 21–32)
CREAT SERPL-MCNC: 2.26 MG/DL (ref 0.6–1.3)
EOSINOPHIL # BLD AUTO: 0.48 THOUSAND/ΜL (ref 0–0.61)
EOSINOPHIL NFR BLD AUTO: 4 % (ref 0–6)
ERYTHROCYTE [DISTWIDTH] IN BLOOD BY AUTOMATED COUNT: 12.4 % (ref 11.6–15.1)
GFR SERPL CREATININE-BSD FRML MDRD: 33 ML/MIN/1.73SQ M
GLUCOSE SERPL-MCNC: 125 MG/DL (ref 65–140)
GLUCOSE SERPL-MCNC: 145 MG/DL (ref 65–140)
GLUCOSE SERPL-MCNC: 167 MG/DL (ref 65–140)
GLUCOSE SERPL-MCNC: 69 MG/DL (ref 65–140)
GLUCOSE SERPL-MCNC: 74 MG/DL (ref 65–140)
GLUCOSE SERPL-MCNC: 82 MG/DL (ref 65–140)
GLUCOSE SERPL-MCNC: 97 MG/DL (ref 65–140)
HCT VFR BLD AUTO: 20.1 % (ref 36.5–49.3)
HGB BLD-MCNC: 6.7 G/DL (ref 12–17)
IMM GRANULOCYTES # BLD AUTO: 0.1 THOUSAND/UL (ref 0–0.2)
IMM GRANULOCYTES NFR BLD AUTO: 1 % (ref 0–2)
INR PPP: 1.12 (ref 0.86–1.17)
LYMPHOCYTES # BLD AUTO: 2.15 THOUSANDS/ΜL (ref 0.6–4.47)
LYMPHOCYTES NFR BLD AUTO: 19 % (ref 14–44)
MCH RBC QN AUTO: 33.2 PG (ref 26.8–34.3)
MCHC RBC AUTO-ENTMCNC: 33.8 G/DL (ref 31.4–37.4)
MCV RBC AUTO: 98 FL (ref 82–98)
MONOCYTES # BLD AUTO: 0.97 THOUSAND/ΜL (ref 0.17–1.22)
MONOCYTES NFR BLD AUTO: 8 % (ref 4–12)
NEUTROPHILS # BLD AUTO: 7.82 THOUSANDS/ΜL (ref 1.85–7.62)
NEUTS SEG NFR BLD AUTO: 68 % (ref 43–75)
NRBC BLD AUTO-RTO: 0 /100 WBCS
PLATELET # BLD AUTO: 327 THOUSANDS/UL (ref 149–390)
PMV BLD AUTO: 10.3 FL (ref 8.9–12.7)
POTASSIUM SERPL-SCNC: 3.6 MMOL/L (ref 3.5–5.3)
PROTHROMBIN TIME: 14.5 SECONDS (ref 11.8–14.2)
RBC # BLD AUTO: 2.05 MILLION/UL (ref 3.88–5.62)
RETICS # AUTO: ABNORMAL 10*3/UL (ref 14356–105094)
RETICS # CALC: 3.14 % (ref 0.37–1.87)
SODIUM SERPL-SCNC: 142 MMOL/L (ref 136–145)
UNIT DISPENSE STATUS: NORMAL
UNIT PRODUCT CODE: NORMAL
UNIT RH: NORMAL
WBC # BLD AUTO: 11.56 THOUSAND/UL (ref 4.31–10.16)

## 2019-02-07 PROCEDURE — 30233N1 TRANSFUSION OF NONAUTOLOGOUS RED BLOOD CELLS INTO PERIPHERAL VEIN, PERCUTANEOUS APPROACH: ICD-10-PCS | Performed by: INTERNAL MEDICINE

## 2019-02-07 PROCEDURE — 85025 COMPLETE CBC W/AUTO DIFF WBC: CPT | Performed by: INTERNAL MEDICINE

## 2019-02-07 PROCEDURE — 88189 FLOWCYTOMETRY/READ 16 & >: CPT | Performed by: PATHOLOGY

## 2019-02-07 PROCEDURE — 88305 TISSUE EXAM BY PATHOLOGIST: CPT | Performed by: PATHOLOGY

## 2019-02-07 PROCEDURE — 99232 SBSQ HOSP IP/OBS MODERATE 35: CPT | Performed by: INTERNAL MEDICINE

## 2019-02-07 PROCEDURE — 80048 BASIC METABOLIC PNL TOTAL CA: CPT | Performed by: INTERNAL MEDICINE

## 2019-02-07 PROCEDURE — 88237 TISSUE CULTURE BONE MARROW: CPT | Performed by: INTERNAL MEDICINE

## 2019-02-07 PROCEDURE — 88341 IMHCHEM/IMCYTCHM EA ADD ANTB: CPT | Performed by: PATHOLOGY

## 2019-02-07 PROCEDURE — P9016 RBC LEUKOCYTES REDUCED: HCPCS

## 2019-02-07 PROCEDURE — 88262 CHROMOSOME ANALYSIS 15-20: CPT | Performed by: INTERNAL MEDICINE

## 2019-02-07 PROCEDURE — 38222 DX BONE MARROW BX & ASPIR: CPT | Performed by: RADIOLOGY

## 2019-02-07 PROCEDURE — 07DR3ZX EXTRACTION OF ILIAC BONE MARROW, PERCUTANEOUS APPROACH, DIAGNOSTIC: ICD-10-PCS | Performed by: RADIOLOGY

## 2019-02-07 PROCEDURE — 99152 MOD SED SAME PHYS/QHP 5/>YRS: CPT | Performed by: RADIOLOGY

## 2019-02-07 PROCEDURE — 88185 FLOWCYTOMETRY/TC ADD-ON: CPT

## 2019-02-07 PROCEDURE — 88184 FLOWCYTOMETRY/ TC 1 MARKER: CPT | Performed by: INTERNAL MEDICINE

## 2019-02-07 PROCEDURE — 85610 PROTHROMBIN TIME: CPT | Performed by: INTERNAL MEDICINE

## 2019-02-07 PROCEDURE — 88342 IMHCHEM/IMCYTCHM 1ST ANTB: CPT | Performed by: PATHOLOGY

## 2019-02-07 PROCEDURE — 86923 COMPATIBILITY TEST ELECTRIC: CPT

## 2019-02-07 PROCEDURE — 88313 SPECIAL STAINS GROUP 2: CPT | Performed by: PATHOLOGY

## 2019-02-07 PROCEDURE — 99153 MOD SED SAME PHYS/QHP EA: CPT

## 2019-02-07 PROCEDURE — 82948 REAGENT STRIP/BLOOD GLUCOSE: CPT

## 2019-02-07 PROCEDURE — 77012 CT SCAN FOR NEEDLE BIOPSY: CPT | Performed by: RADIOLOGY

## 2019-02-07 PROCEDURE — 85097 BONE MARROW INTERPRETATION: CPT | Performed by: PATHOLOGY

## 2019-02-07 PROCEDURE — 88311 DECALCIFY TISSUE: CPT | Performed by: PATHOLOGY

## 2019-02-07 PROCEDURE — 99152 MOD SED SAME PHYS/QHP 5/>YRS: CPT

## 2019-02-07 PROCEDURE — 85045 AUTOMATED RETICULOCYTE COUNT: CPT | Performed by: INTERNAL MEDICINE

## 2019-02-07 PROCEDURE — 38222 DX BONE MARROW BX & ASPIR: CPT

## 2019-02-07 RX ORDER — DEXTROSE MONOHYDRATE 25 G/50ML
INJECTION, SOLUTION INTRAVENOUS
Status: COMPLETED
Start: 2019-02-07 | End: 2019-02-07

## 2019-02-07 RX ORDER — FENTANYL CITRATE 50 UG/ML
INJECTION, SOLUTION INTRAMUSCULAR; INTRAVENOUS CODE/TRAUMA/SEDATION MEDICATION
Status: COMPLETED | OUTPATIENT
Start: 2019-02-07 | End: 2019-02-07

## 2019-02-07 RX ORDER — MIDAZOLAM HYDROCHLORIDE 1 MG/ML
INJECTION INTRAMUSCULAR; INTRAVENOUS CODE/TRAUMA/SEDATION MEDICATION
Status: COMPLETED | OUTPATIENT
Start: 2019-02-07 | End: 2019-02-07

## 2019-02-07 RX ORDER — INSULIN GLARGINE 100 [IU]/ML
8 INJECTION, SOLUTION SUBCUTANEOUS
Status: DISCONTINUED | OUTPATIENT
Start: 2019-02-07 | End: 2019-02-08

## 2019-02-07 RX ADMIN — HEPARIN SODIUM 5000 UNITS: 5000 INJECTION INTRAVENOUS; SUBCUTANEOUS at 14:47

## 2019-02-07 RX ADMIN — MIDAZOLAM 1 MG: 1 INJECTION INTRAMUSCULAR; INTRAVENOUS at 10:58

## 2019-02-07 RX ADMIN — HEPARIN SODIUM 5000 UNITS: 5000 INJECTION INTRAVENOUS; SUBCUTANEOUS at 06:02

## 2019-02-07 RX ADMIN — FOLIC ACID 1 MG: 1 TABLET ORAL at 08:04

## 2019-02-07 RX ADMIN — DEXTROSE MONOHYDRATE 50 ML: 25 INJECTION, SOLUTION INTRAVENOUS at 07:18

## 2019-02-07 RX ADMIN — SODIUM CHLORIDE 100 ML/HR: 0.9 INJECTION, SOLUTION INTRAVENOUS at 15:49

## 2019-02-07 RX ADMIN — MIDAZOLAM 1 MG: 1 INJECTION INTRAMUSCULAR; INTRAVENOUS at 10:50

## 2019-02-07 RX ADMIN — FENTANYL CITRATE 50 MCG: 50 INJECTION, SOLUTION INTRAMUSCULAR; INTRAVENOUS at 10:50

## 2019-02-07 RX ADMIN — FENTANYL CITRATE 50 MCG: 50 INJECTION, SOLUTION INTRAMUSCULAR; INTRAVENOUS at 10:58

## 2019-02-07 RX ADMIN — INSULIN GLARGINE 8 UNITS: 100 INJECTION, SOLUTION SUBCUTANEOUS at 21:25

## 2019-02-07 RX ADMIN — FENTANYL CITRATE 50 MCG: 50 INJECTION, SOLUTION INTRAMUSCULAR; INTRAVENOUS at 11:03

## 2019-02-07 RX ADMIN — SODIUM CHLORIDE 100 ML/HR: 0.9 INJECTION, SOLUTION INTRAVENOUS at 03:42

## 2019-02-07 RX ADMIN — FENTANYL CITRATE 50 MCG: 50 INJECTION, SOLUTION INTRAMUSCULAR; INTRAVENOUS at 11:10

## 2019-02-07 RX ADMIN — Medication 100 MG: at 08:04

## 2019-02-07 RX ADMIN — PANTOPRAZOLE SODIUM 40 MG: 40 TABLET, DELAYED RELEASE ORAL at 06:02

## 2019-02-07 RX ADMIN — HEPARIN SODIUM 5000 UNITS: 5000 INJECTION INTRAVENOUS; SUBCUTANEOUS at 21:24

## 2019-02-07 NOTE — PROGRESS NOTES
NEPHROLOGY PROGRESS NOTE   Elayne March 50 y o  male MRN: [id number]  Unit/Bed#: [place] [address] 2 [address] 221-01 Encounter: [id number]      ASSESSMENT & PLAN:  1  Acute kidney injury on top of CKD given proteinuria back since 2015, suspected multifactorial:  secondary to pre renal component in the setting of uncontrolled glycemia, nausea, vomiting and diarrhea, as well as severe anemia that likely progressed to ATN  Previous creatinine 0 77 back on April 2018  Serum creatinine on admission 7 65  His kidney function is improving, serum creatinine down to 2 26  Avoid hypotension, avoid nephrotoxins  No NSAIDs after discharge, patient reported was taking Motrin for back pain once a week before this admission  With arrange for outpatient follow-up with nephrologist in 2-4 weeks with repeat labs  2  Type 2 diabetes with hyperglycemia  HHNK versus DKA, continue management per primary team   Previously on insulin drip  Endocrinology on board managing diabetes    3  Anion gap metabolic acidosis, lactic acidosis  Improved with medical treatment  4  Severe anemia, status post transfusion  Hemoglobin dropped to 6 7 today, receiving blood transfusion  Reported Hemoccult was negative for blood  Monitor H&H and transfusion as needed  Hematology on board, plan to do a bone marrow biopsy  5  Reported mild right calyceal dilation/questionable hydronephrosis on CT scan  Renal ultrasound did not show any hydronephrosis  Discussed with Dr Debora Coffey from Internal Medicine team       SUBJECTIVE:  Patient seen and examined, upset because needed multiple attempts to get a new intravenously site yesterday  Denies any chest pain or shortness of Breath  Getting blood transfusion during my evaluation      Plan for bone marrow biopsy as per Hematology    OBJECTIVE:  Current Weight: Weight - Scale: 67 kg (147 lb 11 3 oz)  Vitals:    02/07/19 0744   BP:    Pulse:    Resp:    Temp: 97 5 °F (36 4 °C)   SpO2:        Intake/Output Summary (Last 24 hours) at 02/07/19 0848  Last data filed at 02/07/19 2340   Gross per 24 hour   Intake                0 ml   Output             1325 ml   Net            -1325 ml     General: conscious, cooperative, in not acute distress  Eyes: conjunctivae pale, anicteric sclerae  ENT: lips and mucous membranes moist  Neck: supple, no JVD  Chest: clear breath sounds bilateral, no crackles, ronchus or wheezings  CVS: distinct S1 & S2, normal rate, regular rhythm  Abdomen: soft, non-tender, non-distended, normoactive bowel sounds  Extremities: no edema of both legs  Skin: no rash, pale  Neuro: awake, alert, oriented      Medications:    Current Facility-Administered Medications:     folic acid (FOLVITE) tablet 1 mg, 1 mg, Oral, Daily, Vinny Richard MD, 1 mg at 02/07/19 0804    heparin (porcine) subcutaneous injection 5,000 Units, 5,000 Units, Subcutaneous, Q8H [address], 5,000 Units at 02/07/19 0602 **AND** Platelet count, , , Once, ARIANE Nugent    insulin glargine (LANTUS) subcutaneous injection 4 Units 0 04 mL, 4 Units, Subcutaneous, HS, Vinny Richard MD, 4 Units at 02/06/19 2208    insulin lispro (HumaLOG) 100 units/mL subcutaneous injection 1-5 Units, 1-5 Units, Subcutaneous, TID AC, 1 Units at 02/06/19 1700 **AND** Fingerstick Glucose (POCT), , , TID AC, ARIANE Lopez    insulin lispro (HumaLOG) 100 units/mL subcutaneous injection 2 Units, 2 Units, Subcutaneous, TID With Meals, Bernadine Edwards DO, 2 Units at 02/06/19 1700    pantoprazole (PROTONIX) EC tablet 40 mg, 40 mg, Oral, Early Morning, Vinny Richard MD, 40 mg at 02/07/19 0602    sodium chloride 0 9 % infusion, 100 mL/hr, Intravenous, Continuous, ARIANE Lopez, Last Rate: 100 mL/hr at 02/07/19 0342, 100 mL/hr at 02/07/19 0342    thiamine (VITAMIN B1) tablet 100 mg, 100 mg, Oral, Daily, Vinny Richard MD, 100 mg at 02/07/19 0804    Invasive Devices:        Lab Results:     Results from last 7 days  Lab Units 02/07/19  0589 02/06/19  0556 02/05/19  0437 02/04/19  0521 02/03/19  0515  02/02/19  1723 02/02/19  1246   WBC Thousand/uL 11 56* 13 58* 12 66* 12 39* 16 49*  --   --  8 93   HEMOGLOBIN g/dL 6 7* 7 2* 7 3* 7 0* 7 5*  --   --  6 0*   HEMATOCRIT % 20 1* 21 3* 21 5* 20 4* 21 7*  --   --  17 8*   PLATELETS Thousands/uL 327 406* 391* 401* 448*  --   --  404*   SODIUM mmol/L 142 142 140 140 142  < > 137 122*   POTASSIUM mmol/L 3 6 3 8 3 8 3 6 3 6  < > 3 5 5 1   CHLORIDE mmol/L 110* 110* 110* 108 110*  < > 103 90*   CO2 mmol/L 20* 22 21 21 20*  < > 19* 18*   BUN mg/dL 14 20 26* 38* 57*  < > 69* 78*   CREATININE mg/dL 2 26* 2 69* 2 80* 3 48* 5 04*  < > 6 58* 7 65*   CALCIUM mg/dL 7 6* 7 7* 7 5* 7 6* 7 3*  < > 7 2* 7 1*   MAGNESIUM mg/dL  --   --   --  1 7 3 3*  --  1 3*  --    PHOSPHORUS mg/dL  --   --   --  4 5  --   --  4 5  --    ALK PHOS U/L  --   --   --   --   --   --   --  74   ALT U/L  --   --   --   --   --   --   --  37   AST U/L  --   --   --   --   --   --   --  18   < > = values in this interval not displayed  Previous work up:  CT scan of the abdomen and pelvis reported as mild right calyceal dilation/questionable hydronephrosis  Renal ultrasound reported as echogenic bilateral renal parenchyma consistent with medical renal disease, no hydronephrosis      Portions of the record may have been created with voice recognition software  Occasional wrong word or "sound a like" substitutions may have occurred due to the inherent limitations of voice recognition software  Read the chart carefully and recognize, using context, where substitutions have occurred  If you have any questions, please contact the dictating provider

## 2019-02-07 NOTE — TELEPHONE ENCOUNTER
Patient is still currently admitted I will reach out to schedule an appointment when he is discharged  ----- Message from Nahun Bustillos MD sent at 2/6/2019  9:33 AM EST -----  Please arrange for hospital follow-up in our [place] office with 1st available extender or MD in 4 weeks with repeat labs (CBC, renal function panel, PTH, UPC)    Thanks

## 2019-02-07 NOTE — DISCHARGE INSTRUCTIONS
Bone Marrow Biopsy     WHAT YOU NEED TO KNOW:   A bone marrow biopsy is a procedure to remove a small amount of bone marrow from your bone  Bone marrow is the soft tissue inside your bone that helps to make blood cells  The sample is tested for disease or infection  DISCHARGE INSTRUCTIONS:     1  Limit your activities day of biopsy as directed by your doctor  2  Use medication as ordered  3  Return to your normal diet  Small sips of flat soda will help with nausea  4  Remove band-aid or dressing 24 hours after procedure  Contact Interventional Radiology at [phone] [place] PATIENTS: Contact Interventional Radiology at [phone]) Gilberto Ba PATIENTS: Contact Interventional Radiology at [phone]) if:    1  Difficulty breathing, nausea or vomiting  2  Chills or fever above 101 F     3  Pain at biopsy site not relieved by medication  4  Develop any redness, swelling, heat, unusual drainage, heavy bruising or bleeding from biopsy site

## 2019-02-07 NOTE — PROGRESS NOTES
Progress Note - Jose Ayers 50 y o  male MRN: [id number]    Unit/Bed#: [facility] 2 -01 Encounter: [id number]      Assessment/Plan:  1-acute kidney injury:  Patient presented with acute kidney injury with a creatinine of 7 65 on admission   His previous creatinine had been 1 16 in 2016               -patient was evaluated by the nephrology service  [facility] acute kidney injury was felt to be multifactorial, secondary to prerenal, from hyperglycemia, nausea/vomiting/ diarrhea causing volume depletion, plus severe anemia, that then progressed to ATN              -DZTK hydrochlorothiazide and lisinopril were held              -creatinine improved to 2 26              -will follow up with Nephrology service as an outpatient     2-anemia:  Patient presented with acute anemia with a hemoglobin of 6  His previous hemoglobin have been 14 in April 2018                -he denies any recent injury or blood loss   Denies any hematuria, hematochezia, or melena               -D33 and folic acid levels appropriate              -iron and ferritin wnl              -Hemoccult stool negative              -negative schistocytes   Haptoglobin the normal   LDH only mildly elevated               -patient had a precipitous drop in his hemoglobin from 14 down to 6   Not likely secondary to chronic kidney disease alone                 -spep and upep:  Still pending              -parasite smear negative              -appreciate Hematology evaluation:  Bone marrow biopsy performed today   -hemoglobin decreased further today to 6 7:  Transfuse 1 unit of packed red blood cells     3-HHNK:  diabetes type 2, with hyperglycemia:  With long-term use of insulin   Without complication               -patient is a history of type 2 diabetes for the past 14 years  [facility] has been on Lantus 25 units q h s, as well as metformin 1000 mg b i d     This was confirmed in his [facility] records as far back as November 2016               -ROSAURA however states that he was in the [facility] for a month, and did not bring his insulin with him  Madai Shaikh was without his insulin for a month, prior to becoming ill in presenting to the ER in the [facility]               -which he returned [place], patient presented to the ER with hyperglycemia, with a blood sugar of 853  He was admitted to the critical care service, started on an insulin infusion               -patient improved, and was taken off the infusion and started on Lantus insulin, and sliding scale Humalog               -evaluated by the Endocrinology service  Melba Pugh was noted the patient chronic pancreatitis findings on CT scan, therefore patient may respond more like a type 1 diabetes patient, and will continue to require insulin at discharge              -insulin regimen titrated by endocrinologist today: Their assistance is appreciated    Blood sugars were 77, 102, 165              -Lantus was decreased to 8 units for this evening, however as patient will be NPO for bone marrow biopsy, will give only 50% of this scheduled dose for tonight     4-pseudohyponatremia:  Secondary to hyperglycemia   Currently normalized, with improvement in the hyperglycemia      5-questionable hydronephrosis:  On initial CT scan   However renal ultrasound without any evidence of hydronephrosis or stone   Medical renal disease noted      6-alcohol abuse:  Patient's family reported to the staff that patient drinks rum daily  Dandy Pappas are concerned about his alcohol intake   Patient's outpatient records note history of alcohol abuse from 2016                -will start thiamine and folic acid               -patient is currently at hospital day 5,  No current signs or symptoms of withdrawal     7-lactic acidosis:  Likely secondary to volume depletion and acute kidney injury      8-essential hypertension:  Patient has a history of essential hypertension   Was on HCTZ and ACE inhibitor as an outpatient               -currently normotensive:  monitor off medication   -blood pressure most recently 142/67     9-history of atrial fibrillation: per old records   In NSR  Cont to monitor      10-history pancreatitis:  Possibly secondary to reported history of alcohol abuse     11-status post altered mental status:  Patient with reported altered mental status initially   His CT scan of the brain did not have any acute abnormalities   He did not have any focal neurologic deficits on exam               -currently awake, alert, oriented   No focal neurologic deficits   Patient does, at times, have a stuttering speech, however it is fluent and other times  Griseldajuan Basurto indicates that this is his baseline   Discussed with patient's father, and he confirms that patient appears at his baseline mentally, and from a speech standpoint              -previous altered mental status secondary to toxic metabolic encephalopathy from his NK     12-leukocytosis:  Pt with initial leukocytosis of 16 4   Improved to 12 without antibiotic intervention   Patient currently afebrile   Denies any signs or symptoms of infection   Lungs are clear   No bandemia   Likely reactive secondary to [address]              -discussed with infectious disease specialist:  Leukocytosis may be reactive also secondary to his anemia  If patient's parasite smear is unremarkable, and he is not having fevers, no need to repeat parasite smears x3  Unlikely an active infection involved         Discussed with Dr Tahmina Hernandez  Discussed with patient's nurse     Marrow biopsy and blood transfusion today     VTE Pharmacologic Prophylaxis: Heparin  VTE Mechanical Prophylaxis: sequential compression device    Certification Statement: The patient will continue to require additional inpatient hospital stay due to need for further acute intervention for anemia    Status: inpatient     ===================================================================    Subjective:  Patient denies any current pain anywhere    He denies any pain at the bone marrow biopsy site  He denies any headache, chest pain  He denies any shortness of breath or cough  He denies any nausea, vomiting, diarrhea  He is tolerating p o  He denies any other complaints at all  Physical Exam:   Temp:  [97 5 °F (36 4 °C)-99 9 °F (37 7 °C)] 99 6 °F (37 6 °C)  HR:  [57-76] 62  Resp:  [16-18] 16  BP: (126-188)/(67-84) 142/67    Gen:  Pleasant, non-tachypnic, non-dyspnic  Conversant  Heart: regular rate and rhythm, S1S2 present, no murmur, rub or gallop  Lungs: clear to ausculatation bilaterally  No wheezing, crackles, or rhonchi  No accessory muscle use or respiratory distress  Abd: soft, non-tender, non-distended  NABS, no guarding, rebound or peritoneal signs  Extremities: no clubbing, cyanosis or edema  2+pedal pulses bilaterally  Full range of motion  Neuro: awake, alert and oriented  Fluent speech  Smiling and interactive  Skin: warm and dry: no petechiae, purpura and rash  LABS:     Results from last 7 days  Lab Units 02/07/19  0522 02/06/19  0556 02/05/19  0437   WBC Thousand/uL 11 56* 13 58* 12 66*   HEMOGLOBIN g/dL 6 7* 7 2* 7 3*   HEMATOCRIT % 20 1* 21 3* 21 5*   PLATELETS Thousands/uL 327 406* 391*       Results from last 7 days  Lab Units 02/07/19  0522 02/06/19  0556 02/05/19  0437   POTASSIUM mmol/L 3 6 3 8 3 8   CHLORIDE mmol/L 110* 110* 110*   CO2 mmol/L 20* 22 21   BUN mg/dL 14 20 26*   CREATININE mg/dL 2 26* 2 69* 2 80*   CALCIUM mg/dL 7 6* 7 7* 7 5*       Hospital Data:    2/4:  Renal ultrasound:  Echogenic renal parenchyma consistent with medical renal disease   No hydronephrosis     2/2:  CT abdomen/pelvis:  Minimal right calyceal dilatation/questionable hydronephrosis     2/2:  CT brain:  No acute abnormality     2/2:  Chest x-ray:  No acute disease     Microbiology:  2/2:  Blood culture:  Negative x2     Fecal occult blood:  Negative  Parasite smear:  Negative      Procedure:  2/7:   Bone marrow biopsy      ---------------------------------------------------------------------------------------------------------------  This note has been constructed using a voice recognition system

## 2019-02-07 NOTE — PROGRESS NOTES
H and P from admission reviewed prior to procedure  There have been no interval changes  Prior imaging was reviewed  Patient admitted with weakness and found to be anemic  Referred for bone marrow biopsy  Informed written consent was obtained via telephone   Questions answered  Plan for image guided bone marrow biopsy with sedation      Benton Doherty MD  02/07/19  11:31 AM

## 2019-02-07 NOTE — PLAN OF CARE
DISCHARGE PLANNING     Discharge to home or other facility with appropriate resources Progressing        DISCHARGE PLANNING - CARE MANAGEMENT     Discharge to post-acute care or home with appropriate resources Progressing        GENITOURINARY - ADULT     Maintains or returns to baseline urinary function Progressing     Absence of urinary retention Progressing        HEMATOLOGIC - ADULT     Maintains hematologic stability Progressing        INFECTION - ADULT     Absence or prevention of progression during hospitalization Progressing     Absence of fever/infection during neutropenic period Progressing        Knowledge Deficit     Patient/family/caregiver demonstrates understanding of disease process, treatment plan, medications, and discharge instructions Progressing        METABOLIC, FLUID AND ELECTROLYTES - ADULT     Electrolytes maintained within normal limits Progressing     Fluid balance maintained Progressing     Glucose maintained within target range Progressing        Nutrition/Hydration-ADULT     Nutrient/Hydration intake appropriate for improving, restoring or maintaining nutritional needs Progressing        PAIN - ADULT     Verbalizes/displays adequate comfort level or baseline comfort level Progressing        Potential for Falls     Patient will remain free of falls Progressing        Prexisting or High Potential for Compromised Skin Integrity     Skin integrity is maintained or improved Progressing        SAFETY ADULT     Maintain or return to baseline ADL function Progressing     Maintain or return mobility status to optimal level Progressing

## 2019-02-08 LAB
ABO GROUP BLD BPU: NORMAL
ALBUMIN SERPL ELPH-MCNC: 3.47 G/DL (ref 3.5–5)
ALBUMIN SERPL ELPH-MCNC: 55.1 % (ref 52–65)
ALBUMIN UR ELPH-MCNC: 100 %
ALPHA1 GLOB MFR UR ELPH: 0 %
ALPHA1 GLOB SERPL ELPH-MCNC: 0.25 G/DL (ref 0.1–0.4)
ALPHA1 GLOB SERPL ELPH-MCNC: 4 % (ref 2.5–5)
ALPHA2 GLOB MFR UR ELPH: 0 %
ALPHA2 GLOB SERPL ELPH-MCNC: 0.6 G/DL (ref 0.4–1.2)
ALPHA2 GLOB SERPL ELPH-MCNC: 9.5 % (ref 7–13)
ANION GAP SERPL CALCULATED.3IONS-SCNC: 12 MMOL/L (ref 4–13)
B-GLOBULIN MFR UR ELPH: 0 %
BASOPHILS # BLD AUTO: 0.05 THOUSANDS/ΜL (ref 0–0.1)
BASOPHILS NFR BLD AUTO: 0 % (ref 0–1)
BETA GLOB ABNORMAL SERPL ELPH-MCNC: 0.33 G/DL (ref 0.4–0.8)
BETA1 GLOB SERPL ELPH-MCNC: 5.2 % (ref 5–13)
BETA2 GLOB SERPL ELPH-MCNC: 5.2 % (ref 2–8)
BETA2+GAMMA GLOB SERPL ELPH-MCNC: 0.33 G/DL (ref 0.2–0.5)
BPU ID: NORMAL
BUN SERPL-MCNC: 19 MG/DL (ref 5–25)
CALCIUM SERPL-MCNC: 7.6 MG/DL (ref 8.3–10.1)
CHLORIDE SERPL-SCNC: 109 MMOL/L (ref 100–108)
CO2 SERPL-SCNC: 21 MMOL/L (ref 21–32)
CREAT SERPL-MCNC: 2.22 MG/DL (ref 0.6–1.3)
EOSINOPHIL # BLD AUTO: 0.44 THOUSAND/ΜL (ref 0–0.61)
EOSINOPHIL NFR BLD AUTO: 4 % (ref 0–6)
ERYTHROCYTE [DISTWIDTH] IN BLOOD BY AUTOMATED COUNT: 13.6 % (ref 11.6–15.1)
GAMMA GLOB ABNORMAL SERPL ELPH-MCNC: 1.32 G/DL (ref 0.5–1.6)
GAMMA GLOB MFR UR ELPH: 0 %
GAMMA GLOB SERPL ELPH-MCNC: 21 % (ref 12–22)
GFR SERPL CREATININE-BSD FRML MDRD: 34 ML/MIN/1.73SQ M
GLUCOSE SERPL-MCNC: 119 MG/DL (ref 65–140)
GLUCOSE SERPL-MCNC: 144 MG/DL (ref 65–140)
GLUCOSE SERPL-MCNC: 224 MG/DL (ref 65–140)
GLUCOSE SERPL-MCNC: 60 MG/DL (ref 65–140)
GLUCOSE SERPL-MCNC: 66 MG/DL (ref 65–140)
GLUCOSE SERPL-MCNC: 87 MG/DL (ref 65–140)
GLUCOSE SERPL-MCNC: 96 MG/DL (ref 65–140)
GLUCOSE SERPL-MCNC: 96 MG/DL (ref 65–140)
HCT VFR BLD AUTO: 23.7 % (ref 36.5–49.3)
HGB BLD-MCNC: 8.1 G/DL (ref 12–17)
IGG/ALB SER: 1.23 {RATIO} (ref 1.1–1.8)
IMM GRANULOCYTES # BLD AUTO: 0.11 THOUSAND/UL (ref 0–0.2)
IMM GRANULOCYTES NFR BLD AUTO: 1 % (ref 0–2)
INTERPRETATION UR IFE-IMP: NORMAL
LYMPHOCYTES # BLD AUTO: 2.35 THOUSANDS/ΜL (ref 0.6–4.47)
LYMPHOCYTES NFR BLD AUTO: 19 % (ref 14–44)
MCH RBC QN AUTO: 32.9 PG (ref 26.8–34.3)
MCHC RBC AUTO-ENTMCNC: 34.2 G/DL (ref 31.4–37.4)
MCV RBC AUTO: 96 FL (ref 82–98)
MONOCYTES # BLD AUTO: 1.24 THOUSAND/ΜL (ref 0.17–1.22)
MONOCYTES NFR BLD AUTO: 10 % (ref 4–12)
NEUTROPHILS # BLD AUTO: 7.93 THOUSANDS/ΜL (ref 1.85–7.62)
NEUTS SEG NFR BLD AUTO: 66 % (ref 43–75)
NRBC BLD AUTO-RTO: 0 /100 WBCS
PLATELET # BLD AUTO: 308 THOUSANDS/UL (ref 149–390)
PMV BLD AUTO: 11 FL (ref 8.9–12.7)
POTASSIUM SERPL-SCNC: 3.8 MMOL/L (ref 3.5–5.3)
PROT PATTERN UR ELPH-IMP: NORMAL
PROT SERPL-MCNC: 6.3 G/DL (ref 6.4–8.2)
PROT UR-MCNC: 14 MG/DL
RBC # BLD AUTO: 2.46 MILLION/UL (ref 3.88–5.62)
SODIUM SERPL-SCNC: 142 MMOL/L (ref 136–145)
UNIT DISPENSE STATUS: NORMAL
UNIT PRODUCT CODE: NORMAL
UNIT RH: NORMAL
WBC # BLD AUTO: 12.12 THOUSAND/UL (ref 4.31–10.16)

## 2019-02-08 PROCEDURE — 99232 SBSQ HOSP IP/OBS MODERATE 35: CPT | Performed by: INTERNAL MEDICINE

## 2019-02-08 PROCEDURE — 85025 COMPLETE CBC W/AUTO DIFF WBC: CPT | Performed by: INTERNAL MEDICINE

## 2019-02-08 PROCEDURE — 80048 BASIC METABOLIC PNL TOTAL CA: CPT | Performed by: INTERNAL MEDICINE

## 2019-02-08 PROCEDURE — 82948 REAGENT STRIP/BLOOD GLUCOSE: CPT

## 2019-02-08 RX ORDER — INSULIN GLARGINE 100 [IU]/ML
6 INJECTION, SOLUTION SUBCUTANEOUS
Status: DISCONTINUED | OUTPATIENT
Start: 2019-02-08 | End: 2019-02-11

## 2019-02-08 RX ADMIN — HEPARIN SODIUM 5000 UNITS: 5000 INJECTION INTRAVENOUS; SUBCUTANEOUS at 21:19

## 2019-02-08 RX ADMIN — FOLIC ACID 1 MG: 1 TABLET ORAL at 08:26

## 2019-02-08 RX ADMIN — PANTOPRAZOLE SODIUM 40 MG: 40 TABLET, DELAYED RELEASE ORAL at 05:01

## 2019-02-08 RX ADMIN — INSULIN GLARGINE 6 UNITS: 100 INJECTION, SOLUTION SUBCUTANEOUS at 21:19

## 2019-02-08 RX ADMIN — SODIUM CHLORIDE 100 ML/HR: 0.9 INJECTION, SOLUTION INTRAVENOUS at 03:44

## 2019-02-08 RX ADMIN — Medication 100 MG: at 08:26

## 2019-02-08 RX ADMIN — HEPARIN SODIUM 5000 UNITS: 5000 INJECTION INTRAVENOUS; SUBCUTANEOUS at 13:32

## 2019-02-08 RX ADMIN — HEPARIN SODIUM 5000 UNITS: 5000 INJECTION INTRAVENOUS; SUBCUTANEOUS at 05:00

## 2019-02-08 NOTE — PROGRESS NOTES
NEPHROLOGY PROGRESS NOTE   Angeline Jaquez 50 y o  male MRN: [id number]  Unit/Bed#: [facility] 2 [address] 221-01 Encounter: [id number]      ASSESSMENT & PLAN:  1  Acute kidney injury on top of CKD given proteinuria back since 2015, suspected multifactorial:  secondary to pre renal component in the setting of uncontrolled glycemia, nausea, vomiting and diarrhea, as well as severe anemia that likely progressed to ATN  Previous creatinine 0 77 back April 2018  Serum creatinine on admission 7 65  Renal function improving, creatinine down to 2 22 today  Avoid hypotension, avoid nephrotoxins  No NSAIDs after discharge, patient reported was taking Motrin for back pain once or twice a week before this admission  With arrange for outpatient follow-up with nephrologist in 2-4 weeks with repeat labs  2  Type 2 diabetes with hyperglycemia  HHNK versus DKA, continue management per primary team   Previously on insulin drip  Endocrinology on board managing diabetes    3  Anion gap metabolic acidosis, lactic acidosis  Improved with medical treatment  4  Severe anemia, status post transfusion  Reported Hemoccult was negative for blood  Monitor H&H and transfusion as needed  Hematology on board, status post CT-guided bone marrow biopsy yesterday  5  Reported mild right calyceal dilation/questionable hydronephrosis on CT scan  Renal ultrasound did not show any hydronephrosis  Discussed with Dr Roxy Davis from Internal Medicine team    SUBJECTIVE:  Patient seen and examined, denies any complaints  Denies chest pain or shortness of breath, no abdominal pain, nausea vomiting    Status post bone marrow biopsy yesterday    OBJECTIVE:  Current Weight: Weight - Scale: 67 8 kg (149 lb 7 6 oz)  Vitals:    02/08/19 0804   BP: 154/85   Pulse: 69   Resp: 18   Temp: 98 7 °F (37 1 °C)   SpO2: 98%       Intake/Output Summary (Last 24 hours) at 02/08/19 0912  Last data filed at 02/08/19 0828   Gross per 24 hour   Intake             9220 ml Output             1820 ml   Net             7400 ml     General: conscious, cooperative, in not acute distress  Eyes: conjunctivae pale, anicteric sclerae  ENT: lips and mucous membranes moist  Neck: supple, no JVD  Chest: clear breath sounds bilateral, no crackles, ronchus or wheezings  CVS: distinct S1 & S2, normal rate, regular rhythm  Abdomen: soft, non-tender, non-distended, normoactive bowel sounds  Extremities: no edema of both legs  Skin: no rash  Neuro: awake, alert, oriented      Medications:    Current Facility-Administered Medications:     folic acid (FOLVITE) tablet 1 mg, 1 mg, Oral, Daily, Pierre Vázquez MD, 1 mg at 02/08/19 0826    heparin (porcine) subcutaneous injection 5,000 Units, 5,000 Units, Subcutaneous, Q[address]H [address], 5,000 Units at 02/08/19 0500 **AND** Platelet count, , , Once, ARIANE Bates    insulin glargine (LANTUS) subcutaneous injection 6 Units 0 06 mL, 6 Units, Subcutaneous, HS, Ran Stake, DO    insulin lispro (HumaLOG) 100 units/mL subcutaneous injection 1-5 Units, 1-5 Units, Subcutaneous, TID AC, 1 Units at 02/06/19 1700 **AND** Fingerstick Glucose (POCT), , , TID AC, ARIANE Lopez    insulin lispro (HumaLOG) 100 units/mL subcutaneous injection 2 Units, 2 Units, Subcutaneous, TID With Meals, Ran Stake, DO, 2 Units at 02/07/19 1845    pantoprazole (PROTONIX) EC tablet 40 mg, 40 mg, Oral, Early Morning, Pierre Vázquez MD, 40 mg at 02/08/19 0501    sodium chloride 0 9 % infusion, 100 mL/hr, Intravenous, Continuous, ARIANE Lopez, Last Rate: 100 mL/hr at 02/08/19 0344, 100 mL/hr at 02/08/19 0344    thiamine (VITAMIN B1) tablet 100 mg, 100 mg, Oral, Daily, Pierre Vázquez MD, 100 mg at 02/08/19 0826    Invasive Devices:        Lab Results:     Results from last 7 days  Lab Units 02/08/19  0455 02/07/19  0522 02/06/19  0556  02/04/19  0521 02/03/19  0515  02/02/19  1723 02/02/19  1246   WBC Thousand/uL 12 12* 11 56* 13 58*  < > 12 39* 16 49*  -- --  8 93   HEMOGLOBIN g/dL 8 1* 6 7* 7 2*  < > 7 0* 7 5*  --   --  6 0*   HEMATOCRIT % 23 7* 20 1* 21 3*  < > 20 4* 21 7*  --   --  17 8*   PLATELETS Thousands/uL 308 327 406*  < > 401* 448*  --   --  404*   SODIUM mmol/L 142 142 142  < > 140 142  < > 137 122*   POTASSIUM mmol/L 3 8 3 6 3 8  < > 3 6 3 6  < > 3 5 5 1   CHLORIDE mmol/L 109* 110* 110*  < > 108 110*  < > 103 90*   CO2 mmol/L 21 20* 22  < > 21 20*  < > 19* 18*   BUN mg/dL 19 14 20  < > 38* 57*  < > 69* 78*   CREATININE mg/dL 2 22* 2 26* 2 69*  < > 3 48* 5 04*  < > 6 58* 7 65*   CALCIUM mg/dL 7 6* 7 6* 7 7*  < > 7 6* 7 3*  < > 7 2* 7 1*   MAGNESIUM mg/dL  --   --   --   --  1 7 3 3*  --  1 3*  --    PHOSPHORUS mg/dL  --   --   --   --  4 5  --   --  4 5  --    ALK PHOS U/L  --   --   --   --   --   --   --   --  74   ALT U/L  --   --   --   --   --   --   --   --  37   AST U/L  --   --   --   --   --   --   --   --  18   < > = values in this interval not displayed  Previous work up:  CT scan of the abdomen and pelvis reported as mild right calyceal dilation/questionable hydronephrosis  Renal ultrasound reported as echogenic bilateral renal parenchyma consistent with medical renal disease, no hydronephrosis      Portions of the record may have been created with voice recognition software  Occasional wrong word or "sound a like" substitutions may have occurred due to the inherent limitations of voice recognition software  Read the chart carefully and recognize, using context, where substitutions have occurred  If you have any questions, please contact the dictating provider

## 2019-02-08 NOTE — PROGRESS NOTES
Patient: Angeles Coates  Patient MRN: [id number]  Service date: 2/8/2019  Attending Physician:       CHIEF COMPLAIN  Chief Complaint   Patient presents with    Dizziness     dizziness, back pain, hx kidney issues  Arrived yesterday from , was hospitalized there for "kidney problems", states physician states he need to come to [address] [facility] for further treatment  occasional CP/SOB  Heme / Oncology history:  Angeles Coates is a 50 y o  male     anemia       HISTORY OF PRESENT ILLNESS:  Angeles Coates is a 50 y o  male who presents with 2 weeks history of nausea, vomiting, diarrhea after trip to [facility]  Hematology was consulted for anemia     Reported feeling okay, no bleeding  Normal skin color  PROBLEM LIST:  Patient Active Problem List   Diagnosis    Essential hypertension    Type 2 diabetes mellitus with hyperglycemia, with long-term current use of insulin ([facility] )    Alcohol abuse    Alcohol intoxication ([facility] )    Paroxysmal A-fib (HCC)    Chronic pancreatitis (HCC)    Thrombocytopenia (HCC)    ZENAIDA (acute kidney injury) ([facility] )    Anemia due to chronic kidney disease    Hyponatremia    History of atrial fibrillation    Toxic metabolic encephalopathy       ASSESSMENT/PLAN:  Angeles Coates is a 50 y o  male with:    1) anemia  - acute  Baseline hemoglobin 14, in April of 2018  - presented with hemoglobin of 6, received transfusion  MCV 96   - no bleeding, iron panel is consistent with chronic disease,  no hemolysis / haptoglobin above 50  B12 is 1000, SPEP is negative, creatinine 7 on admission, has been improving     - overall the anemia is likely due to underlying chronic inflammation, plus ZENAIDA on possible CKD  Retic count is relatively low, bone marrow biopsy was done on 02/07  Will continue to follow  - there is no additional workup needed has for now from hematology standpoint  Need follow-up as outpatient  2) leukocytosis  - very mild, likely reactive  minutes were spent face to face with patient with greater than 50% of the time spent in counseling or coordination of care including discussions of treatment instructions  All of the patient's questions were answered to their satisfactory during this discussion   Lakia Camarillo MD PhD  Hematology / Oncology                              PAST MEDICAL HISTORY:   has a past medical history of Alcohol abuse; Chronic pancreatitis ([facility] ); Diabetes mellitus ([facility] ); Hypertension; Pancreatitis; Paroxysmal A-fib ([facility] ); and Thrombocytopenia ([place] [address] )  PAST SURGICAL HISTORY:   has a past surgical history that includes IR bone marrow biopsy/aspiration (2/7/2019)  CURRENT MEDICATIONS  Scheduled Meds:  Current Facility-Administered Medications:  folic acid 1 mg Oral Daily Ángel Reveles MD   heparin (porcine) 5,000 Units Subcutaneous [facility] ARIANE Marx   insulin glargine 6 Units Subcutaneous HS Jonna Hernández DO   insulin lispro 1-5 Units Subcutaneous TID AC Jessica ARIANE Dye   insulin lispro 2 Units Subcutaneous TID With Meals Jonna Hernández DO   pantoprazole 40 mg Oral Early Morning Ángel Reveles MD   thiamine 100 mg Oral Daily Ángel Reveles MD     Continuous Infusions:   PRN Meds:  SOCIAL HISTORY:   reports that he has quit smoking  He does not have any smokeless tobacco history on file  He reports that he drinks alcohol  He reports that he does not use drugs  FAMILY HISTORY:  family history includes Diabetes in his mother; Hyperlipidemia in his father; Hypertension in his mother  ALLERGIES:  has No Known Allergies  REVIEW OF SYSTEMS:  Please note that a 14-point review of systems was performed to include Constitutional, HEENT, Respiratory, CVS, GI, , Musculoskeletal, Integumentary, Neurologic, Rheumatologic, Endocrinologic, Psychiatric, Lymphatic, and Hematologic/Oncologic systems were reviewed and are negative unless otherwise stated in HPI   Positive and negative findings pertinent to this evaluation are incorporated into the history of present illness  PHYSICAL EXAMINATION:  Vital Signs: Temp:  [98 2 °F (36 8 °C)-99 6 °F (37 6 °C)] 98 7 °F (37 1 °C)  HR:  [62-69] 69  Resp:  [16-18] 18  BP: (142-154)/(67-85) 154/85  There is no height or weight on file to calculate BMI  There is no height or weight on file to calculate BSA  Constitutional: Alert and oriented  HEENT: Anicteric, PERRLA  Chest: Decreased breathing sound bilaterally, No wheezes/rales/rhonchi  CVS: Regular rhythm  Normal rate  Abdomen: Soft, nontender, nondistended  No palpable organomegaly  Extremities: No cyanosis/clubbing/edema  Integumentary: No obvious rashes or bruises  Musculoskeletal: No obvious bony or joint deformities  Psychiatric: Appropriate affect and mood  Lymph Node Survey: No palpable preauricular, submandibular, cervical, supraclavicular, axillary, epitrochlear or inguinal lymphadenopathy      LABS:    Results from last 7 days  Lab Units 02/08/19  0455 02/07/19  0522 02/06/19  0556   WBC Thousand/uL 12 12* 11 56* 13 58*   HEMATOCRIT % 23 7* 20 1* 21 3*   PLATELETS Thousands/uL 308 327 406*   NEUTROS PCT % 66 68 63   MONOS PCT % 10 8 9       Results from last 7 days  Lab Units 02/08/19  0455 02/07/19  0522 02/06/19  0556   POTASSIUM mmol/L 3 8 3 6 3 8   CHLORIDE mmol/L 109* 110* 110*   CO2 mmol/L 21 20* 22   BUN mg/dL 19 14 20       Results from last 7 days  Lab Units 02/04/19  0521 02/03/19  0515 02/02/19  1723 02/02/19  1246   PHOSPHORUS mg/dL 4 5  --  4 5  --    MAGNESIUM mg/dL 1 7 3 3* 1 3*  --    ALBUMIN g/dL  --   --   --  3 0*   ALK PHOS U/L  --   --   --  74   ALT U/L  --   --   --  37   AST U/L  --   --   --  18       Results from last 7 days  Lab Units 02/07/19  0522 02/02/19  1245   INR  1 12 1 01   PTT seconds  --  31           Invalid input(s): TNI,  PCT      Results from last 7 days  Lab Units 02/02/19  2355   LD U/L 319*           IMAGING:  IR bone marrow biopsy/aspiration   Final Result   Impression:      Technically successful image guided bone marrow aspiration and core biopsy         Workstation performed: [id number]         US kidney and bladder   Final Result      Echogenic renal parenchyma consistent with medical renal disease  No hydronephrosis  Workstation performed: [id number]         XR chest portable   ED Interpretation   IMPRESSION:       No acute cardiopulmonary disease  Final Result      No acute cardiopulmonary disease  Workstation performed: [id number]         CT abdomen pelvis wo contrast   ED Interpretation   IMPRESSION:       Minimal right calyceal dilatation/questionable hydronephrosis  Final Result      Minimal right calyceal dilatation/questionable hydronephrosis  Workstation performed: [id number]         CT head without contrast   ED Interpretation   IMPRESSION:       No acute intracranial abnormality  Final Result      No acute intracranial abnormality                    Workstation performed: [id number]

## 2019-02-08 NOTE — PROGRESS NOTES
Progress Note - Hugo Rowley 50 y o  male MRN: [id number]    Unit/Bed#: [facility] [facility] 2 -01 Encounter: [id number]    Assessment/Plan:    Acute kidney injury  appears ATN related and now improving creatinine 2 22 today will DC IV fluids and check in a m , continue outpatient nephrology follow-up    Anemia   continue hematology follow-up, status post bone marrow biopsy    Diabetes   erratic control, monitoring with Endocrinology with Lantus and Humalog    Alcohol abuse   cessation counseling provided must discontinue usage for ever    Subjective:   Feels better, denies chest pain shortness of breath nausea vomiting diarrhea no fevers chills appetite is okay    Objective:     Vitals: Blood pressure 154/85, pulse 69, temperature 98 7 °F (37 1 °C), temperature source Temporal, resp  rate 18, weight 67 8 kg (149 lb 7 6 oz), SpO2 98 %  ,There is no height or weight on file to calculate BMI  Results from last 7 days  Lab Units 02/08/19  0455 02/07/19  0522   WBC Thousand/uL 12 12* 11 56*   HEMOGLOBIN g/dL 8 1* 6 7*   HEMATOCRIT % 23 7* 20 1*   PLATELETS Thousands/uL 308 327   INR   --  1 12       Results from last 7 days  Lab Units 02/08/19  0455  02/02/19  1246   POTASSIUM mmol/L 3 8  < > 5 1   CHLORIDE mmol/L 109*  < > 90*   CO2 mmol/L 21  < > 18*   BUN mg/dL 19  < > 78*   CREATININE mg/dL 2 22*  < > 7 65*   CALCIUM mg/dL 7 6*  < > 7 1*   ALK PHOS U/L  --   --  74   ALT U/L  --   --  37   AST U/L  --   --  18   < > = values in this interval not displayed      Scheduled Meds:    Current Facility-Administered Medications:  folic acid 1 mg Oral Daily Maria Yang MD   heparin (porcine) 5,000 Units Subcutaneous [facility] ARIANE Moss   insulin glargine 6 Units Subcutaneous HS Rakesh Salvador DO   insulin lispro 1-5 Units Subcutaneous TID AC ARIANE Lopez   insulin lispro 2 Units Subcutaneous TID With Meals Rakesh Salvador DO   pantoprazole 40 mg Oral Early Morning Maria Yang MD   thiamine 100 mg Oral Daily Ángel Reveles MD       Continuous Infusions:     Physical exam:  General appearance:  Alert no distress interaction appropriate   Head/Eyes:  Nonicteric PERRL EOMI  Neck:  Supple  Lungs: CTA bilateral no wheezing rhonchi or rales  Heart: normal S1 S2 regular  Abdomen: Soft nontender with bowel sounds  Extremities: no edema  Skin: no rash    Invasive Devices     Peripheral Intravenous Line            Peripheral IV 02/07/19 Left Forearm 1 day                  VTE Pharmacologic Prophylaxis:  heparin         Counseling / Coordination of Care  Total floor / unit time spent today  30  minutes  Greater than 50% of total time was spent with the patient and / or family counseling and / or coordination of care    A description of the counseling / coordination of care:

## 2019-02-08 NOTE — PROGRESS NOTES
Reviewed blood sugars from past 24 hours  Sugars running below goal for hospital so will decrease Lantus to 6 units  Continue Humalog 2 units with each meal   Monitor closely for hypoglycemia and treat per protocol

## 2019-02-08 NOTE — SOCIAL WORK
CM was provided scripts for all diabetic needs  Faxed down to [facility] with weekend covering CM ext  Originals in chart

## 2019-02-09 LAB
ANION GAP SERPL CALCULATED.3IONS-SCNC: 9 MMOL/L (ref 4–13)
BASOPHILS # BLD AUTO: 0.06 THOUSANDS/ΜL (ref 0–0.1)
BASOPHILS NFR BLD AUTO: 1 % (ref 0–1)
BUN SERPL-MCNC: 16 MG/DL (ref 5–25)
CALCIUM SERPL-MCNC: 8.3 MG/DL (ref 8.3–10.1)
CHLORIDE SERPL-SCNC: 110 MMOL/L (ref 100–108)
CO2 SERPL-SCNC: 23 MMOL/L (ref 21–32)
CREAT SERPL-MCNC: 2.53 MG/DL (ref 0.6–1.3)
EOSINOPHIL # BLD AUTO: 0.41 THOUSAND/ΜL (ref 0–0.61)
EOSINOPHIL NFR BLD AUTO: 4 % (ref 0–6)
ERYTHROCYTE [DISTWIDTH] IN BLOOD BY AUTOMATED COUNT: 13.4 % (ref 11.6–15.1)
GFR SERPL CREATININE-BSD FRML MDRD: 29 ML/MIN/1.73SQ M
GLUCOSE SERPL-MCNC: 122 MG/DL (ref 65–140)
GLUCOSE SERPL-MCNC: 137 MG/DL (ref 65–140)
GLUCOSE SERPL-MCNC: 138 MG/DL (ref 65–140)
GLUCOSE SERPL-MCNC: 193 MG/DL (ref 65–140)
GLUCOSE SERPL-MCNC: 224 MG/DL (ref 65–140)
HCT VFR BLD AUTO: 24.7 % (ref 36.5–49.3)
HGB BLD-MCNC: 8.3 G/DL (ref 12–17)
IMM GRANULOCYTES # BLD AUTO: 0.09 THOUSAND/UL (ref 0–0.2)
IMM GRANULOCYTES NFR BLD AUTO: 1 % (ref 0–2)
LYMPHOCYTES # BLD AUTO: 2.41 THOUSANDS/ΜL (ref 0.6–4.47)
LYMPHOCYTES NFR BLD AUTO: 24 % (ref 14–44)
MCH RBC QN AUTO: 32.2 PG (ref 26.8–34.3)
MCHC RBC AUTO-ENTMCNC: 33.6 G/DL (ref 31.4–37.4)
MCV RBC AUTO: 96 FL (ref 82–98)
MONOCYTES # BLD AUTO: 1.3 THOUSAND/ΜL (ref 0.17–1.22)
MONOCYTES NFR BLD AUTO: 13 % (ref 4–12)
NEUTROPHILS # BLD AUTO: 5.81 THOUSANDS/ΜL (ref 1.85–7.62)
NEUTS SEG NFR BLD AUTO: 57 % (ref 43–75)
NRBC BLD AUTO-RTO: 0 /100 WBCS
PLATELET # BLD AUTO: 316 THOUSANDS/UL (ref 149–390)
PMV BLD AUTO: 10.8 FL (ref 8.9–12.7)
POTASSIUM SERPL-SCNC: 3.9 MMOL/L (ref 3.5–5.3)
RBC # BLD AUTO: 2.58 MILLION/UL (ref 3.88–5.62)
SODIUM SERPL-SCNC: 142 MMOL/L (ref 136–145)
WBC # BLD AUTO: 10.08 THOUSAND/UL (ref 4.31–10.16)

## 2019-02-09 PROCEDURE — 99232 SBSQ HOSP IP/OBS MODERATE 35: CPT | Performed by: INTERNAL MEDICINE

## 2019-02-09 PROCEDURE — 82948 REAGENT STRIP/BLOOD GLUCOSE: CPT

## 2019-02-09 PROCEDURE — 80048 BASIC METABOLIC PNL TOTAL CA: CPT | Performed by: INTERNAL MEDICINE

## 2019-02-09 PROCEDURE — 85025 COMPLETE CBC W/AUTO DIFF WBC: CPT | Performed by: INTERNAL MEDICINE

## 2019-02-09 RX ADMIN — INSULIN GLARGINE 6 UNITS: 100 INJECTION, SOLUTION SUBCUTANEOUS at 21:21

## 2019-02-09 RX ADMIN — HEPARIN SODIUM 5000 UNITS: 5000 INJECTION INTRAVENOUS; SUBCUTANEOUS at 14:43

## 2019-02-09 RX ADMIN — HEPARIN SODIUM 5000 UNITS: 5000 INJECTION INTRAVENOUS; SUBCUTANEOUS at 21:21

## 2019-02-09 RX ADMIN — INSULIN LISPRO 1 UNITS: 100 INJECTION, SOLUTION INTRAVENOUS; SUBCUTANEOUS at 11:41

## 2019-02-09 RX ADMIN — FOLIC ACID 1 MG: 1 TABLET ORAL at 08:38

## 2019-02-09 RX ADMIN — Medication 100 MG: at 08:39

## 2019-02-09 RX ADMIN — PANTOPRAZOLE SODIUM 40 MG: 40 TABLET, DELAYED RELEASE ORAL at 05:17

## 2019-02-09 RX ADMIN — HEPARIN SODIUM 5000 UNITS: 5000 INJECTION INTRAVENOUS; SUBCUTANEOUS at 05:17

## 2019-02-09 RX ADMIN — INSULIN LISPRO 2 UNITS: 100 INJECTION, SOLUTION INTRAVENOUS; SUBCUTANEOUS at 17:07

## 2019-02-09 NOTE — PROGRESS NOTES
NEPHROLOGY PROGRESS NOTE    Yovana Gaffney 50 y o  male MRN: [id number]  Unit/Bed#: [place] [address] [address] [place]  Encounter: [id number]  Reason for Consult:  Acute renal failure    Patient relatively stable no acute changes  ASSESSMENT/PLAN:  1  Renal  The patient had acute renal failure with no prior history of kidney disease according to the notes with a creatinine around 0 8 in April of last year  Urinalysis on presentation had no proteinuria as well  He likely had severe volume depletion with out of control diabetes and likely developed ATN given the slow rate of improvement  For now creatinine has slowly improved but it does not appear to have gotten better over the last 4 days  Will continue to follow up in certainly will need follow-up upon discharge  Blood sugars are controlled in the hospital     2  Hyperglycemia  Improved diabetic control in the hospital     SUBJECTIVE:  Review of Systems   Constitution: Negative  HENT: Negative  Eyes: Negative  Cardiovascular: Negative  Negative for orthopnea  Respiratory: Negative  Negative for cough and shortness of breath  Gastrointestinal: Negative  Genitourinary: Negative  OBJECTIVE:  Current Weight: Weight - Scale: 68 5 kg (151 lb 0 2 oz)  Vitals:Temp (24hrs), Av 9 °F (37 2 °C), Min:98 7 °F (37 1 °C), Max:99 1 °F (37 3 °C)  Current: Temperature: 98 7 °F (37 1 °C)   Blood pressure (!) 176/81, pulse 62, temperature 98 7 °F (37 1 °C), temperature source Tympanic, resp  rate 16, weight 68 5 kg (151 lb 0 2 oz), SpO2 99 %  , There is no height or weight on file to calculate BMI        Intake/Output Summary (Last 24 hours) at 19 0822  Last data filed at 19 0739   Gross per 24 hour   Intake           888 33 ml   Output             2500 ml   Net         -1611 67 ml       Physical Exam: BP (!) 176/81 (BP Location: Right arm)   Pulse 62   Temp 98 7 °F (37 1 °C) (Tympanic)   Resp 16   Wt 68 5 kg (151 lb 0 2 oz)   SpO2 99%   Physical Exam Constitutional: No distress  HENT:   Mouth/Throat: No oropharyngeal exudate  Eyes: No scleral icterus  Neck: Neck supple  No JVD present  Cardiovascular: Normal rate and regular rhythm  Exam reveals no friction rub  Pulmonary/Chest: Effort normal and breath sounds normal  No respiratory distress  He has no wheezes  He has no rales  Abdominal: Soft  Bowel sounds are normal  He exhibits no distension  There is no tenderness  There is no rebound         Medications:    Current Facility-Administered Medications:     folic acid (FOLVITE) tablet 1 mg, 1 mg, Oral, Daily, Pierre Vázquez MD, 1 mg at 02/08/19 0826    heparin (porcine) subcutaneous injection 5,000 Units, 5,000 Units, Subcutaneous, Q8H [address], 5,000 Units at 02/09/19 0517 **AND** Platelet count, , , Once, ARIANE Bates    insulin glargine (LANTUS) subcutaneous injection 6 Units 0 06 mL, 6 Units, Subcutaneous, HS, Ran Stake, DO, 6 Units at 02/08/19 2119    insulin lispro (HumaLOG) 100 units/mL subcutaneous injection 1-5 Units, 1-5 Units, Subcutaneous, TID AC, 1 Units at 02/06/19 1700 **AND** Fingerstick Glucose (POCT), , , TID AC, ARIANE Lopez    insulin lispro (HumaLOG) 100 units/mL subcutaneous injection 2 Units, 2 Units, Subcutaneous, TID With Meals, Ran Stake, DO, 2 Units at 02/08/19 1906    pantoprazole (PROTONIX) EC tablet 40 mg, 40 mg, Oral, Early Morning, Pierre Vázquez MD, 40 mg at 02/09/19 0517    thiamine (VITAMIN B1) tablet 100 mg, 100 mg, Oral, Daily, Pierre Vázquez MD, 100 mg at 02/08/19 0826    Laboratory Results:  Lab Results   Component Value Date    WBC 10 08 02/09/2019    HGB 8 3 (L) 02/09/2019    HCT 24 7 (L) 02/09/2019    MCV 96 02/09/2019     02/09/2019     Lab Results   Component Value Date    GLUCOSE 242 (H) 04/11/2018    CALCIUM 8 3 02/09/2019     04/11/2018    K 3 9 02/09/2019    CO2 23 02/09/2019     (H) 02/09/2019    BUN 16 02/09/2019    CREATININE 2 53 (H) 02/09/2019     Lab Results   Component Value Date    CALCIUM 8 3 02/09/2019    PHOS 4 5 02/04/2019     No results found for: LABPROT

## 2019-02-09 NOTE — PROGRESS NOTES
Progress Note - Renny Quigley 50 y o  male MRN: [id number]    Unit/Bed#: [place] [facility] 2 -01 Encounter: [id number]    Assessment/Plan:    ZENAIDA   creatinine and appears to have baseline around 2 5, continue to monitor with Nephrology    Anemia  slowly improving hemoglobin 8 3 today, follow-up with Hematology on bone marrow biopsy repeat    Diabetes  working on diet control, stable meals constant carbohydrate intake per day continue Lantus and Humalog per Endocrinology guidelines    Alcohol abuse  will could attempt to discontinue usage for ever    Subjective:   Feels good, denies chest pain shortness of breath nausea vomiting diarrhea no fevers chills appetite is okay    Objective:     Vitals: Blood pressure 148/76, pulse 62, temperature 98 7 °F (37 1 °C), temperature source Tympanic, resp  rate 16, weight 68 5 kg (151 lb 0 2 oz), SpO2 99 %  ,There is no height or weight on file to calculate BMI  Results from last 7 days  Lab Units 02/09/19  0504  02/07/19  0522   WBC Thousand/uL 10 08  < > 11 56*   HEMOGLOBIN g/dL 8 3*  < > 6 7*   HEMATOCRIT % 24 7*  < > 20 1*   PLATELETS Thousands/uL 316  < > 327   INR   --   --  1 12   < > = values in this interval not displayed  Results from last 7 days  Lab Units 02/09/19  0504  02/02/19  1246   POTASSIUM mmol/L 3 9  < > 5 1   CHLORIDE mmol/L 110*  < > 90*   CO2 mmol/L 23  < > 18*   BUN mg/dL 16  < > 78*   CREATININE mg/dL 2 53*  < > 7 65*   CALCIUM mg/dL 8 3  < > 7 1*   ALK PHOS U/L  --   --  74   ALT U/L  --   --  37   AST U/L  --   --  18   < > = values in this interval not displayed      Scheduled Meds:    Current Facility-Administered Medications:  folic acid 1 mg Oral Daily Rakesh Almazan MD   heparin (porcine) 5,000 Units Subcutaneous [facility] ARIANE Jansen   insulin glargine 6 Units Subcutaneous HS Morgan Cohen DO   insulin lispro 1-5 Units Subcutaneous TID ARIANE Schuster   insulin lispro 2 Units Subcutaneous TID With Meals Morgan Cohen DO   pantoprazole 40 mg Oral Early Morning Jaylin Zambrano MD   thiamine 100 mg Oral Daily Jaylin Zambrano MD       Continuous Infusions:     Physical exam:  General appearance:  Alert no distress interaction appropriate  Head/Eyes:  Nonicteric pale PERRL EOMI  Neck:  Supple  Lungs: CTA bilateral no wheezing rhonchi or rales  Heart: normal S1 S2 regular  Abdomen: Soft nontender with bowel sounds  Extremities: no edema  Skin: no rash    Invasive Devices     Peripheral Intravenous Line            Peripheral IV 02/07/19 Left Forearm 2 days                  VTE Pharmacologic Prophylaxis:  Heparin        Counseling / Coordination of Care  Total floor / unit time spent today 30  minutes  Greater than 50% of total time was spent with the patient and / or family counseling and / or coordination of care    A description of the counseling / coordination of care:

## 2019-02-10 LAB
ANION GAP SERPL CALCULATED.3IONS-SCNC: 9 MMOL/L (ref 4–13)
BASOPHILS # BLD AUTO: 0.04 THOUSANDS/ΜL (ref 0–0.1)
BASOPHILS NFR BLD AUTO: 0 % (ref 0–1)
BUN SERPL-MCNC: 17 MG/DL (ref 5–25)
CALCIUM SERPL-MCNC: 8.8 MG/DL (ref 8.3–10.1)
CHLORIDE SERPL-SCNC: 108 MMOL/L (ref 100–108)
CO2 SERPL-SCNC: 24 MMOL/L (ref 21–32)
CREAT SERPL-MCNC: 2.28 MG/DL (ref 0.6–1.3)
EOSINOPHIL # BLD AUTO: 0.36 THOUSAND/ΜL (ref 0–0.61)
EOSINOPHIL NFR BLD AUTO: 4 % (ref 0–6)
ERYTHROCYTE [DISTWIDTH] IN BLOOD BY AUTOMATED COUNT: 13.3 % (ref 11.6–15.1)
GFR SERPL CREATININE-BSD FRML MDRD: 33 ML/MIN/1.73SQ M
GLUCOSE SERPL-MCNC: 115 MG/DL (ref 65–140)
GLUCOSE SERPL-MCNC: 145 MG/DL (ref 65–140)
GLUCOSE SERPL-MCNC: 346 MG/DL (ref 65–140)
GLUCOSE SERPL-MCNC: 88 MG/DL (ref 65–140)
GLUCOSE SERPL-MCNC: 89 MG/DL (ref 65–140)
HCT VFR BLD AUTO: 27.1 % (ref 36.5–49.3)
HGB BLD-MCNC: 9 G/DL (ref 12–17)
IMM GRANULOCYTES # BLD AUTO: 0.07 THOUSAND/UL (ref 0–0.2)
IMM GRANULOCYTES NFR BLD AUTO: 1 % (ref 0–2)
LYMPHOCYTES # BLD AUTO: 2.66 THOUSANDS/ΜL (ref 0.6–4.47)
LYMPHOCYTES NFR BLD AUTO: 29 % (ref 14–44)
MCH RBC QN AUTO: 32.4 PG (ref 26.8–34.3)
MCHC RBC AUTO-ENTMCNC: 33.2 G/DL (ref 31.4–37.4)
MCV RBC AUTO: 98 FL (ref 82–98)
MONOCYTES # BLD AUTO: 1.05 THOUSAND/ΜL (ref 0.17–1.22)
MONOCYTES NFR BLD AUTO: 11 % (ref 4–12)
NEUTROPHILS # BLD AUTO: 5.15 THOUSANDS/ΜL (ref 1.85–7.62)
NEUTS SEG NFR BLD AUTO: 55 % (ref 43–75)
NRBC BLD AUTO-RTO: 0 /100 WBCS
PLATELET # BLD AUTO: 306 THOUSANDS/UL (ref 149–390)
PMV BLD AUTO: 10.6 FL (ref 8.9–12.7)
POTASSIUM SERPL-SCNC: 4 MMOL/L (ref 3.5–5.3)
RBC # BLD AUTO: 2.78 MILLION/UL (ref 3.88–5.62)
SODIUM SERPL-SCNC: 141 MMOL/L (ref 136–145)
WBC # BLD AUTO: 9.33 THOUSAND/UL (ref 4.31–10.16)

## 2019-02-10 PROCEDURE — 85025 COMPLETE CBC W/AUTO DIFF WBC: CPT | Performed by: INTERNAL MEDICINE

## 2019-02-10 PROCEDURE — 80048 BASIC METABOLIC PNL TOTAL CA: CPT | Performed by: INTERNAL MEDICINE

## 2019-02-10 PROCEDURE — 99232 SBSQ HOSP IP/OBS MODERATE 35: CPT | Performed by: INTERNAL MEDICINE

## 2019-02-10 PROCEDURE — 82948 REAGENT STRIP/BLOOD GLUCOSE: CPT

## 2019-02-10 RX ADMIN — HEPARIN SODIUM 5000 UNITS: 5000 INJECTION INTRAVENOUS; SUBCUTANEOUS at 21:16

## 2019-02-10 RX ADMIN — FOLIC ACID 1 MG: 1 TABLET ORAL at 07:49

## 2019-02-10 RX ADMIN — Medication 100 MG: at 07:49

## 2019-02-10 RX ADMIN — PANTOPRAZOLE SODIUM 40 MG: 40 TABLET, DELAYED RELEASE ORAL at 05:00

## 2019-02-10 RX ADMIN — HEPARIN SODIUM 5000 UNITS: 5000 INJECTION INTRAVENOUS; SUBCUTANEOUS at 05:00

## 2019-02-10 RX ADMIN — HEPARIN SODIUM 5000 UNITS: 5000 INJECTION INTRAVENOUS; SUBCUTANEOUS at 13:13

## 2019-02-10 RX ADMIN — INSULIN LISPRO 4 UNITS: 100 INJECTION, SOLUTION INTRAVENOUS; SUBCUTANEOUS at 16:15

## 2019-02-10 RX ADMIN — INSULIN GLARGINE 6 UNITS: 100 INJECTION, SOLUTION SUBCUTANEOUS at 21:16

## 2019-02-10 NOTE — PROGRESS NOTES
NEPHROLOGY PROGRESS NOTE    Annamaria Ceballos 50 y o  male MRN: [id number]  Unit/Bed#: [place] [address] 2 [address]01 Encounter: [id number]  Reason for Consult:  Acute renal failure    ASSESSMENT/PLAN:  1  Renal  Patient had acute renal failure with severe hyperglycemia and creatinine is slowly improving  The weight is slowly improving makes me think that there is likely some intrinsic process such as ATN that occurred and hopefully it will declined back to his previous normal baseline as it was last April  Continue supportive care  Of note urinalysis showed no significant abnormality such as proteinuria  Supportive care  BMP a m     2  Diabetes mellitus  Improved control in the hospital continue with education and outpatient follow-up with family physician  SUBJECTIVE:  Review of Systems   Constitution: Negative for chills and fever  HENT: Negative  Eyes: Negative  Cardiovascular: Negative  Negative for chest pain, leg swelling and orthopnea  Respiratory: Negative  Negative for cough and shortness of breath  Gastrointestinal: Negative  Genitourinary: Negative  OBJECTIVE:  Current Weight: Weight - Scale: 67 2 kg (148 lb 2 4 oz)  Vitals:Temp (24hrs), Av 6 °F (30 3 °C), Min:39 2 °F (4 °C), Max:99 3 °F (37 4 °C)  Current: Temperature: 98 8 °F (37 1 °C)   Blood pressure 147/83, pulse 60, temperature 98 8 °F (37 1 °C), temperature source Temporal, resp  rate 18, weight 67 2 kg (148 lb 2 4 oz), SpO2 99 %  , There is no height or weight on file to calculate BMI  Intake/Output Summary (Last 24 hours) at 2/10/2019 0933  Last data filed at 2/10/2019 0335  Gross per 24 hour   Intake 420 ml   Output 1100 ml   Net -680 ml       Physical Exam: /83 (BP Location: Right arm)   Pulse 60   Temp 98 8 °F (37 1 °C) (Temporal)   Resp 18   Wt 67 2 kg (148 lb 2 4 oz)   SpO2 99%   Physical Exam   Constitutional: He appears well-nourished  No distress  HENT:   Head: Atraumatic  Neck: Neck supple   No JVD present  Cardiovascular: Normal rate and regular rhythm  Exam reveals no friction rub  Pulmonary/Chest: Effort normal and breath sounds normal  No respiratory distress  He has no rales  Abdominal: Soft  Bowel sounds are normal  He exhibits no distension  There is no tenderness         Medications:    Current Facility-Administered Medications:     folic acid (FOLVITE) tablet 1 mg, 1 mg, Oral, Daily, Ángel Reveles MD, 1 mg at 02/10/19 0749    heparin (porcine) subcutaneous injection 5,000 Units, 5,000 Units, Subcutaneous, Q8H [facility], 5,000 Units at 02/10/19 0500 **AND** Platelet count, , , Once, Yvonne Tang, ARIANE    insulin glargine (LANTUS) subcutaneous injection 6 Units 0 06 mL, 6 Units, Subcutaneous, HS, Jonna Hernández DO, 6 Units at 02/09/19 2121    insulin lispro (HumaLOG) 100 units/mL subcutaneous injection 1-5 Units, 1-5 Units, Subcutaneous, TID AC, 2 Units at 02/09/19 1707 **AND** Fingerstick Glucose (POCT), , , TID AC, Jessica ARIANE Dye    insulin lispro (HumaLOG) 100 units/mL subcutaneous injection 2 Units, 2 Units, Subcutaneous, TID With Meals, Jonna Hernández DO, 2 Units at 02/10/19 0931    pantoprazole (PROTONIX) EC tablet 40 mg, 40 mg, Oral, Early Morning, Ángel Reveles MD, 40 mg at 02/10/19 0500    thiamine (VITAMIN B1) tablet 100 mg, 100 mg, Oral, Daily, Ángel Reveles MD, 100 mg at 02/10/19 0749    Laboratory Results:  Lab Results   Component Value Date    WBC 9 33 02/10/2019    HGB 9 0 (L) 02/10/2019    HCT 27 1 (L) 02/10/2019    MCV 98 02/10/2019     02/10/2019     Lab Results   Component Value Date    GLUCOSE 242 (H) 04/11/2018    CALCIUM 8 8 02/10/2019     04/11/2018    K 4 0 02/10/2019    CO2 24 02/10/2019     02/10/2019    BUN 17 02/10/2019    CREATININE 2 28 (H) 02/10/2019     Lab Results   Component Value Date    CALCIUM 8 8 02/10/2019    PHOS 4 5 02/04/2019     No results found for: LABPROT

## 2019-02-10 NOTE — PROGRESS NOTES
Progress Note - Cathy Bernstein 50 y o  male MRN: [id number]    Unit/Bed#: [place] 2 -01 Encounter: [id number]    Assessment/Plan:    A KI   possible new baseline 2 2, creatinine seems to have stabilized continue follow-up with Nephrology, ZENAIDA cause most likely ATN       Diabetes  improved control with assistance from Endocrinology, continue  Insulin and strict carbohydrate intake    Anemia  hemoglobin slowly improving 9 0 continue follow-up with Hematology regarding bone marrow biopsy results    Alcohol abuse promises to discontinue usage     Subjective:   Feels good denies chest pain shortness of breath nausea vomiting diarrhea no fevers chills appetite is okay    Objective:     Vitals: Blood pressure 147/83, pulse 60, temperature 98 8 °F (37 1 °C), temperature source Temporal, resp  rate 18, weight 67 2 kg (148 lb 2 4 oz), SpO2 99 %  ,There is no height or weight on file to calculate BMI  Results from last 7 days   Lab Units 02/10/19  0448  02/07/19  0522   WBC Thousand/uL 9 33   < > 11 56*   HEMOGLOBIN g/dL 9 0*   < > 6 7*   HEMATOCRIT % 27 1*   < > 20 1*   PLATELETS Thousands/uL 306   < > 327   INR   --   --  1 12    < > = values in this interval not displayed       Results from last 7 days   Lab Units 02/10/19  0448   POTASSIUM mmol/L 4 0   CHLORIDE mmol/L 108   CO2 mmol/L 24   BUN mg/dL 17   CREATININE mg/dL 2 28*   CALCIUM mg/dL 8 8       Scheduled Meds:    Current Facility-Administered Medications:  folic acid 1 mg Oral Daily Pierre Vázquez MD   heparin (porcine) 5,000 Units Subcutaneous [facility] ARIANE Bates   insulin glargine 6 Units Subcutaneous HS Ran Delilah, DO   insulin lispro 1-5 Units Subcutaneous TID AC ARIANE Lopez   insulin lispro 2 Units Subcutaneous TID With Meals Ran Mazariegos,    pantoprazole 40 mg Oral Early Morning Pierre Vázquez MD   thiamine 100 mg Oral Daily Pierre Vázquez MD       Continuous Infusions:     Physical exam:  General appearance:  Alert no distress interaction appropriate oriented x3  Head/Eyes:  Nonicteric PERRL EOMI  Neck:  Supple  Lungs: CTA bilateral no wheezing rhonchi or rales  Heart: normal S1 S2 regular  Abdomen: Soft nontender with bowel sounds  Extremities: no edema  Skin: no rash    Invasive Devices     Peripheral Intravenous Line            Peripheral IV 02/07/19 Left Forearm 3 days                  VTE Pharmacologic Prophylaxis:  Heparin        Counseling / Coordination of Care  Total floor / unit time spent today 30  minutes  Greater than 50% of total time was spent with the patient and / or family counseling and / or coordination of care    A description of the counseling / coordination of care:

## 2019-02-10 NOTE — PLAN OF CARE
Problem: Potential for Falls  Goal: Patient will remain free of falls  Description  INTERVENTIONS:  - Assess patient frequently for physical needs  -  Identify cognitive and physical deficits and behaviors that affect risk of falls    -  [place] fall precautions as indicated by assessment   - Educate patient/family on patient safety including physical limitations  - Instruct patient to call for assistance with activity based on assessment  - Modify environment to reduce risk of injury  - Consider OT/PT consult to assist with strengthening/mobility   Outcome: Progressing     Problem: Prexisting or High Potential for Compromised Skin Integrity  Goal: Skin integrity is maintained or improved  Description  INTERVENTIONS:  - Identify patients at risk for skin breakdown  - Assess and monitor skin integrity  - Assess and monitor nutrition and hydration status  - Monitor labs (i e  albumin)  - Assess for incontinence   - Turn and reposition patient  - Assist with mobility/ambulation  - Relieve pressure over bony prominences  - Avoid friction and shearing  - Provide appropriate hygiene as needed including keeping skin clean and dry  - Evaluate need for skin moisturizer/barrier cream  - Collaborate with interdisciplinary team (i e  Nutrition, Rehabilitation, etc )   - Patient/family teaching   Outcome: Progressing     Problem: PAIN - ADULT  Goal: Verbalizes/displays adequate comfort level or baseline comfort level  Description  Interventions:  - Encourage patient to monitor pain and request assistance  - Assess pain using appropriate pain scale  - Administer analgesics based on type and severity of pain and evaluate response  - Implement non-pharmacological measures as appropriate and evaluate response  - Consider cultural and social influences on pain and pain management  - Notify physician/advanced practitioner if interventions unsuccessful or patient reports new pain   Outcome: Progressing     Problem: INFECTION - ADULT  Goal: Absence or prevention of progression during hospitalization  Description  INTERVENTIONS:  - Assess and monitor for signs and symptoms of infection  - Monitor lab/diagnostic results  - Monitor all insertion sites, i e  indwelling lines, tubes, and drains  - Monitor endotracheal (as able) and nasal secretions for changes in amount and color  - [place] appropriate cooling/warming therapies per order  - Administer medications as ordered  - Instruct and encourage patient and family to use good hand hygiene technique  - Identify and instruct in appropriate isolation precautions for identified infection/condition   Outcome: Progressing  Goal: Absence of fever/infection during neutropenic period  Description  INTERVENTIONS:  - Monitor WBC  - Implement neutropenic guidelines   Outcome: Progressing     Problem: SAFETY ADULT  Goal: Maintain or return to baseline ADL function  Description  INTERVENTIONS:  -  Assess patient's ability to carry out ADLs; assess patient's baseline for ADL function and identify physical deficits which impact ability to perform ADLs (bathing, care of mouth/teeth, toileting, grooming, dressing, etc )  - Assess/evaluate cause of self-care deficits   - Assess range of motion  - Assess patient's mobility; develop plan if impaired  - Assess patient's need for assistive devices and provide as appropriate  - Encourage maximum independence but intervene and supervise when necessary  ¯ Involve family in performance of ADLs  ¯ Assess for home care needs following discharge   ¯ Request OT consult to assist with ADL evaluation and planning for discharge  ¯ Provide patient education as appropriate   Outcome: Progressing  Goal: Maintain or return mobility status to optimal level  Description  INTERVENTIONS:  - Assess patient's baseline mobility status (ambulation, transfers, stairs, etc )    - Identify cognitive and physical deficits and behaviors that affect mobility  - Identify mobility aids required to assist with transfers and/or ambulation (gait belt, sit-to-stand, lift, walker, cane, etc )  - [place] fall precautions as indicated by assessment  - Record patient progress and toleration of activity level on Mobility SBAR; progress patient to next Phase/Stage  - Instruct patient to call for assistance with activity based on assessment  - Request Rehabilitation consult to assist with strengthening/weightbearing, etc    Outcome: Progressing     Problem: DISCHARGE PLANNING  Goal: Discharge to home or other facility with appropriate resources  Description  INTERVENTIONS:  - Identify barriers to discharge w/patient and caregiver  - Arrange for needed discharge resources and transportation as appropriate  - Identify discharge learning needs (meds, wound care, etc )  - Arrange for interpretive services to assist at discharge as needed  - Refer to Case Management Department for coordinating discharge planning if the patient needs post-hospital services based on physician/advanced practitioner order or complex needs related to functional status, cognitive ability, or social support system   Outcome: Progressing     Problem: Knowledge Deficit  Goal: Patient/family/caregiver demonstrates understanding of disease process, treatment plan, medications, and discharge instructions  Description  Complete learning assessment and assess knowledge base    Interventions:  - Provide teaching at level of understanding  - Provide teaching via preferred learning methods   Outcome: Progressing     Problem: GENITOURINARY - ADULT  Goal: Maintains or returns to baseline urinary function  Description  INTERVENTIONS:  - Assess urinary function  - Encourage oral fluids to ensure adequate hydration  - Administer IV fluids as ordered to ensure adequate hydration  - Administer ordered medications as needed  - Offer frequent toileting  - Follow urinary retention protocol if ordered   Outcome: Progressing  Goal: Absence of urinary retention  Description  INTERVENTIONS:  - Assess patient?s ability to void and empty bladder  - Monitor I/O  - Bladder scan as needed  - Discuss with physician/AP medications to alleviate retention as needed  - Discuss catheterization for long term situations as appropriate   Outcome: Progressing     Problem: METABOLIC, FLUID AND ELECTROLYTES - ADULT  Goal: Electrolytes maintained within normal limits  Description  INTERVENTIONS:  - Monitor labs and assess patient for signs and symptoms of electrolyte imbalances  - Administer electrolyte replacement as ordered  - Monitor response to electrolyte replacements, including repeat lab results as appropriate  - Instruct patient on fluid and nutrition as appropriate     Outcome: Progressing  Goal: Fluid balance maintained  Description  INTERVENTIONS:  - Monitor labs and assess for signs and symptoms of volume excess or deficit  - Monitor I/O and WT  - Instruct patient on fluid and nutrition as appropriate    Outcome: Progressing  Goal: Glucose maintained within target range  Description  INTERVENTIONS:  - Monitor Blood Glucose as ordered  - Assess for signs and symptoms of hyperglycemia and hypoglycemia  - Administer ordered medications to maintain glucose within target range  - Assess nutritional intake and initiate nutrition service referral as needed     Outcome: Progressing     Problem: HEMATOLOGIC - ADULT  Goal: Maintains hematologic stability  Description  INTERVENTIONS  - Assess for signs and symptoms of bleeding or hemorrhage  - Monitor labs  - Administer supportive blood products/factors as ordered and appropriate     Outcome: Progressing     Problem: Nutrition/Hydration-ADULT  Goal: Nutrient/Hydration intake appropriate for improving, restoring or maintaining nutritional needs  Description  Monitor and assess patient's nutrition/hydration status for malnutrition (ex- brittle hair, bruises, dry skin, pale skin and conjunctiva, muscle wasting, smooth red tongue, and disorientation)  Collaborate with interdisciplinary team and initiate plan and interventions as ordered  Monitor patient's weight and dietary intake as ordered or per policy  Utilize nutrition screening tool and intervene per policy  Determine patient's food preferences and provide high-protein, high-caloric foods as appropriate       INTERVENTIONS:  - Monitor oral intake, urinary output, labs, and treatment plans  - Assess nutrition and hydration status and recommend course of action  - Evaluate amount of meals eaten  - Assist patient with eating if necessary   - Allow adequate time for meals  - Recommend/ encourage appropriate diets, oral nutritional supplements, and vitamin/mineral supplements  - Order, calculate, and assess calorie counts as needed  - Recommend, monitor, and adjust tube feedings and TPN/PPN based on assessed needs  - Assess need for intravenous fluids  - Provide specific nutrition/hydration education as appropriate  - Include patient/family/caregiver in decisions related to nutrition   Outcome: Progressing     Problem: DISCHARGE PLANNING - CARE MANAGEMENT  Goal: Discharge to post-acute care or home with appropriate resources  Description  INTERVENTIONS:  - Conduct assessment to determine patient/family and health care team treatment goals, and need for post-acute services based on payer coverage, community resources, and patient preferences, and barriers to discharge  - Address psychosocial, clinical, and financial barriers to discharge as identified in assessment in conjunction with the patient/family and health care team  - Arrange appropriate level of post-acute services according to patient?s   needs and preference and payer coverage in collaboration with the physician and health care team  - Communicate with and update the patient/family, physician, and health care team regarding progress on the discharge plan  - Arrange appropriate transportation to post-acute venues   Outcome: Progressing

## 2019-02-11 LAB
GLUCOSE SERPL-MCNC: 124 MG/DL (ref 65–140)
GLUCOSE SERPL-MCNC: 192 MG/DL (ref 65–140)
GLUCOSE SERPL-MCNC: 28 MG/DL (ref 65–140)
GLUCOSE SERPL-MCNC: 31 MG/DL (ref 65–140)
GLUCOSE SERPL-MCNC: 383 MG/DL (ref 65–140)
GLUCOSE SERPL-MCNC: 46 MG/DL (ref 65–140)
GLUCOSE SERPL-MCNC: 87 MG/DL (ref 65–140)
GLUCOSE SERPL-MCNC: 90 MG/DL (ref 65–140)
GLUCOSE SERPL-MCNC: 93 MG/DL (ref 65–140)

## 2019-02-11 PROCEDURE — 99232 SBSQ HOSP IP/OBS MODERATE 35: CPT | Performed by: INTERNAL MEDICINE

## 2019-02-11 PROCEDURE — 82948 REAGENT STRIP/BLOOD GLUCOSE: CPT

## 2019-02-11 RX ORDER — INSULIN GLARGINE 100 [IU]/ML
6 INJECTION, SOLUTION SUBCUTANEOUS
Status: DISCONTINUED | OUTPATIENT
Start: 2019-02-11 | End: 2019-02-12 | Stop reason: HOSPADM

## 2019-02-11 RX ORDER — DEXTROSE MONOHYDRATE 25 G/50ML
INJECTION, SOLUTION INTRAVENOUS
Status: COMPLETED
Start: 2019-02-11 | End: 2019-02-11

## 2019-02-11 RX ORDER — DEXTROSE AND SODIUM CHLORIDE 5; .9 G/100ML; G/100ML
50 INJECTION, SOLUTION INTRAVENOUS CONTINUOUS
Status: DISCONTINUED | OUTPATIENT
Start: 2019-02-11 | End: 2019-02-11

## 2019-02-11 RX ADMIN — HEPARIN SODIUM 5000 UNITS: 5000 INJECTION INTRAVENOUS; SUBCUTANEOUS at 21:12

## 2019-02-11 RX ADMIN — INSULIN LISPRO 4 UNITS: 100 INJECTION, SOLUTION INTRAVENOUS; SUBCUTANEOUS at 16:14

## 2019-02-11 RX ADMIN — FOLIC ACID 1 MG: 1 TABLET ORAL at 08:20

## 2019-02-11 RX ADMIN — INSULIN GLARGINE 6 UNITS: 100 INJECTION, SOLUTION SUBCUTANEOUS at 21:12

## 2019-02-11 RX ADMIN — DEXTROSE 50 % IN WATER (D50W) INTRAVENOUS SYRINGE 25 ML: at 18:41

## 2019-02-11 RX ADMIN — PANTOPRAZOLE SODIUM 40 MG: 40 TABLET, DELAYED RELEASE ORAL at 05:40

## 2019-02-11 RX ADMIN — HEPARIN SODIUM 5000 UNITS: 5000 INJECTION INTRAVENOUS; SUBCUTANEOUS at 05:40

## 2019-02-11 RX ADMIN — DEXTROSE 50 % IN WATER (D50W) INTRAVENOUS SYRINGE 25 ML: at 11:12

## 2019-02-11 RX ADMIN — Medication 100 MG: at 08:20

## 2019-02-11 NOTE — PLAN OF CARE
Problem: Potential for Falls  Goal: Patient will remain free of falls  Description  INTERVENTIONS:  - Assess patient frequently for physical needs  -  Identify cognitive and physical deficits and behaviors that affect risk of falls    -  [place] fall precautions as indicated by assessment   - Educate patient/family on patient safety including physical limitations  - Instruct patient to call for assistance with activity based on assessment  - Modify environment to reduce risk of injury  - Consider OT/PT consult to assist with strengthening/mobility   Outcome: Progressing     Problem: Prexisting or High Potential for Compromised Skin Integrity  Goal: Skin integrity is maintained or improved  Description  INTERVENTIONS:  - Identify patients at risk for skin breakdown  - Assess and monitor skin integrity  - Assess and monitor nutrition and hydration status  - Monitor labs (i e  albumin)  - Assess for incontinence   - Turn and reposition patient  - Assist with mobility/ambulation  - Relieve pressure over bony prominences  - Avoid friction and shearing  - Provide appropriate hygiene as needed including keeping skin clean and dry  - Evaluate need for skin moisturizer/barrier cream  - Collaborate with interdisciplinary team (i e  Nutrition, Rehabilitation, etc )   - Patient/family teaching   Outcome: Progressing     Problem: PAIN - ADULT  Goal: Verbalizes/displays adequate comfort level or baseline comfort level  Description  Interventions:  - Encourage patient to monitor pain and request assistance  - Assess pain using appropriate pain scale  - Administer analgesics based on type and severity of pain and evaluate response  - Implement non-pharmacological measures as appropriate and evaluate response  - Consider cultural and social influences on pain and pain management  - Notify physician/advanced practitioner if interventions unsuccessful or patient reports new pain   Outcome: Progressing     Problem: INFECTION - ADULT  Goal: Absence or prevention of progression during hospitalization  Description  INTERVENTIONS:  - Assess and monitor for signs and symptoms of infection  - Monitor lab/diagnostic results  - Monitor all insertion sites, i e  indwelling lines, tubes, and drains  - Monitor endotracheal (as able) and nasal secretions for changes in amount and color  - [place] appropriate cooling/warming therapies per order  - Administer medications as ordered  - Instruct and encourage patient and family to use good hand hygiene technique  - Identify and instruct in appropriate isolation precautions for identified infection/condition   Outcome: Progressing  Goal: Absence of fever/infection during neutropenic period  Description  INTERVENTIONS:  - Monitor WBC  - Implement neutropenic guidelines   Outcome: Progressing     Problem: SAFETY ADULT  Goal: Maintain or return to baseline ADL function  Description  INTERVENTIONS:  -  Assess patient's ability to carry out ADLs; assess patient's baseline for ADL function and identify physical deficits which impact ability to perform ADLs (bathing, care of mouth/teeth, toileting, grooming, dressing, etc )  - Assess/evaluate cause of self-care deficits   - Assess range of motion  - Assess patient's mobility; develop plan if impaired  - Assess patient's need for assistive devices and provide as appropriate  - Encourage maximum independence but intervene and supervise when necessary  ¯ Involve family in performance of ADLs  ¯ Assess for home care needs following discharge   ¯ Request OT consult to assist with ADL evaluation and planning for discharge  ¯ Provide patient education as appropriate   Outcome: Progressing  Goal: Maintain or return mobility status to optimal level  Description  INTERVENTIONS:  - Assess patient's baseline mobility status (ambulation, transfers, stairs, etc )    - Identify cognitive and physical deficits and behaviors that affect mobility  - Identify mobility aids required to assist with transfers and/or ambulation (gait belt, sit-to-stand, lift, walker, cane, etc )  - [place] fall precautions as indicated by assessment  - Record patient progress and toleration of activity level on Mobility SBAR; progress patient to next Phase/Stage  - Instruct patient to call for assistance with activity based on assessment  - Request Rehabilitation consult to assist with strengthening/weightbearing, etc    Outcome: Progressing     Problem: DISCHARGE PLANNING  Goal: Discharge to home or other facility with appropriate resources  Description  INTERVENTIONS:  - Identify barriers to discharge w/patient and caregiver  - Arrange for needed discharge resources and transportation as appropriate  - Identify discharge learning needs (meds, wound care, etc )  - Arrange for interpretive services to assist at discharge as needed  - Refer to Case Management Department for coordinating discharge planning if the patient needs post-hospital services based on physician/advanced practitioner order or complex needs related to functional status, cognitive ability, or social support system   Outcome: Progressing     Problem: Knowledge Deficit  Goal: Patient/family/caregiver demonstrates understanding of disease process, treatment plan, medications, and discharge instructions  Description  Complete learning assessment and assess knowledge base    Interventions:  - Provide teaching at level of understanding  - Provide teaching via preferred learning methods   Outcome: Progressing     Problem: GENITOURINARY - ADULT  Goal: Maintains or returns to baseline urinary function  Description  INTERVENTIONS:  - Assess urinary function  - Encourage oral fluids to ensure adequate hydration  - Administer IV fluids as ordered to ensure adequate hydration  - Administer ordered medications as needed  - Offer frequent toileting  - Follow urinary retention protocol if ordered   Outcome: Progressing  Goal: Absence of urinary retention  Description  INTERVENTIONS:  - Assess patient?s ability to void and empty bladder  - Monitor I/O  - Bladder scan as needed  - Discuss with physician/AP medications to alleviate retention as needed  - Discuss catheterization for long term situations as appropriate   Outcome: Progressing     Problem: METABOLIC, FLUID AND ELECTROLYTES - ADULT  Goal: Electrolytes maintained within normal limits  Description  INTERVENTIONS:  - Monitor labs and assess patient for signs and symptoms of electrolyte imbalances  - Administer electrolyte replacement as ordered  - Monitor response to electrolyte replacements, including repeat lab results as appropriate  - Instruct patient on fluid and nutrition as appropriate     Outcome: Progressing  Goal: Fluid balance maintained  Description  INTERVENTIONS:  - Monitor labs and assess for signs and symptoms of volume excess or deficit  - Monitor I/O and WT  - Instruct patient on fluid and nutrition as appropriate    Outcome: Progressing  Goal: Glucose maintained within target range  Description  INTERVENTIONS:  - Monitor Blood Glucose as ordered  - Assess for signs and symptoms of hyperglycemia and hypoglycemia  - Administer ordered medications to maintain glucose within target range  - Assess nutritional intake and initiate nutrition service referral as needed     Outcome: Progressing     Problem: HEMATOLOGIC - ADULT  Goal: Maintains hematologic stability  Description  INTERVENTIONS  - Assess for signs and symptoms of bleeding or hemorrhage  - Monitor labs  - Administer supportive blood products/factors as ordered and appropriate     Outcome: Progressing     Problem: Nutrition/Hydration-ADULT  Goal: Nutrient/Hydration intake appropriate for improving, restoring or maintaining nutritional needs  Description  Monitor and assess patient's nutrition/hydration status for malnutrition (ex- brittle hair, bruises, dry skin, pale skin and conjunctiva, muscle wasting, smooth red tongue, and disorientation)  Collaborate with interdisciplinary team and initiate plan and interventions as ordered  Monitor patient's weight and dietary intake as ordered or per policy  Utilize nutrition screening tool and intervene per policy  Determine patient's food preferences and provide high-protein, high-caloric foods as appropriate       INTERVENTIONS:  - Monitor oral intake, urinary output, labs, and treatment plans  - Assess nutrition and hydration status and recommend course of action  - Evaluate amount of meals eaten  - Assist patient with eating if necessary   - Allow adequate time for meals  - Recommend/ encourage appropriate diets, oral nutritional supplements, and vitamin/mineral supplements  - Order, calculate, and assess calorie counts as needed  - Recommend, monitor, and adjust tube feedings and TPN/PPN based on assessed needs  - Assess need for intravenous fluids  - Provide specific nutrition/hydration education as appropriate  - Include patient/family/caregiver in decisions related to nutrition   Outcome: Progressing     Problem: DISCHARGE PLANNING - CARE MANAGEMENT  Goal: Discharge to post-acute care or home with appropriate resources  Description  INTERVENTIONS:  - Conduct assessment to determine patient/family and health care team treatment goals, and need for post-acute services based on payer coverage, community resources, and patient preferences, and barriers to discharge  - Address psychosocial, clinical, and financial barriers to discharge as identified in assessment in conjunction with the patient/family and health care team  - Arrange appropriate level of post-acute services according to patient?s   needs and preference and payer coverage in collaboration with the physician and health care team  - Communicate with and update the patient/family, physician, and health care team regarding progress on the discharge plan  - Arrange appropriate transportation to post-acute venues   Outcome: Progressing

## 2019-02-11 NOTE — PROGRESS NOTES
NEPHROLOGY PROGRESS NOTE   Parrish Margarita 52 y o  male MRN: [id number]  Unit/Bed#: [facility] 2 [address] 221-01 Encounter: [id number]  Reason for Consult: Zenaida/CKD    ASSESSMENT AND PLAN:  Patient is 72-year-old male with suspected CKD stage 2 to three a with baseline creatinine 1 1 in 2016, diabetes, presented with generalized weakness  We are consulted for ZENAIDA/CKD management  ZENAIDA on suspected CKD stage 2 to 3A, baseline creatinine 1 1 in 2016, no recent labs available since then   -creatinine 7 6 on admission seems to have significantly improved and plateaued around 2 2 with last creatinine 2 2 yesterday  No labs available today   -check BMP in a m  [facility] -ZENAIDA suspected to be multifactorial secondary to prerenal in the setting of uncontrolled hyperglycemia, nausea, vomiting, diarrhea, likely progressed to ATN  -avoid nephrotoxins or NSAIDs  -urinalysis this admission showed no hematuria or proteinuria  -once renal function remains stable, check UPC ratio   -monitor intake and output   -check bladder scan for PVR  -renal ultrasound shows both kidneys normal size, diffusely echogenic renal parenchyma consistent with CKD, no hydronephrosis or stones  Proteinuria on urinalysis in the past  -once renal function remains stable, check UPC ratio   -serum immunofixation negative, UPEP negative  -avoid Ace inhibitor for now due to ZENAIDA  -hemoglobin A1c 8 8 in April 2018  Severe anemia, transfuse p r n  For hemoglobin less than seven  Continue to closely monitor  Hypertension, blood pressure overall acceptable  Avoid low BP   -home medication list shows lisinopril/HCTZ as outpatient  Currently avoiding all antihypertensive medications  Continue to monitor blood pressure   -continue to hold lisinopril/HCTZ  Mild right caliceal dilatation/questionable hydronephrosis on CT scan reported  Renal ultrasound did not show hydronephrosis      If renal function remains stable, okay from Nephrology standpoint for discharge with close outpatient Nephrology monitoring  Will discuss with primary team     SUBJECTIVE:  Patient seen and examined at bedside  No chest pain, shortness of breath, nausea, vomiting, abdominal pain or diarrhea      OBJECTIVE:  Current Weight: Weight - Scale: 66 3 kg (146 lb 2 6 oz)  Vitals:    02/11/19 0716   BP: 131/76   Pulse: 57   Resp: 16   Temp: 98 9 °F (37 2 °C)   SpO2: 99%       Intake/Output Summary (Last 24 hours) at 2/11/2019 1158  Last data filed at 2/11/2019 0600  Gross per 24 hour   Intake    Output 300 ml   Net -300 ml       Physical Examination:  General:  Lying in bed, no acute distress   Eyes:  Mild conjunctival pallor present  ENT:  External examination of ears and nose unremarkable  Neck:  Supple  Respiratory:  Bilateral air entry present  CVS:  S1, S2 present  GI:  Soft, nontender, nondistended  CNS:  Active alert oriented x3  Extremities:  No significant edema in legs  Skin:  No new rash in legs    Medications:    Current Facility-Administered Medications:     folic acid (FOLVITE) tablet 1 mg, 1 mg, Oral, Daily, Chino Hdez MD, 1 mg at 02/11/19 0820    heparin (porcine) subcutaneous injection 5,000 Units, 5,000 Units, Subcutaneous, Q[address]H [address], 5,000 Units at 02/11/19 0540 **AND** Platelet count, , , Once, ARIANE Saeed    insulin glargine (LANTUS) subcutaneous injection 6 Units 0 06 mL, 6 Units, Subcutaneous, HS, Kristene Brightly, DO, 6 Units at 02/10/19 2116    insulin lispro (HumaLOG) 100 units/mL subcutaneous injection 1-5 Units, 1-5 Units, Subcutaneous, TID AC, 4 Units at 02/10/19 1615 **AND** Fingerstick Glucose (POCT), , , TID AC, ARIANE Lopez    insulin lispro (HumaLOG) 100 units/mL subcutaneous injection 2 Units, 2 Units, Subcutaneous, TID With Meals, Kristene Brightly, DO, 2 Units at 02/11/19 0906    pantoprazole (PROTONIX) EC tablet 40 mg, 40 mg, Oral, Early Morning, Chino Hdez MD, 40 mg at 02/11/19 0540    thiamine (VITAMIN B1) tablet 100 mg, 100 mg, Oral, Daily, Pierre Vázquez MD, 100 mg at 02/11/19 0820    Laboratory Results:  Results from last 7 days   Lab Units 02/10/19  0448 02/09/19  0504 02/08/19  0455 02/07/19  0522 02/06/19  0556 02/05/19  0437   WBC Thousand/uL 9 33 10 08 12 12* 11 56* 13 58* 12 66*   HEMOGLOBIN g/dL 9 0* 8 3* 8 1* 6 7* 7 2* 7 3*   HEMATOCRIT % 27 1* 24 7* 23 7* 20 1* 21 3* 21 5*   PLATELETS Thousands/uL 306 316 308 327 406* 391*   POTASSIUM mmol/L 4 0 3 9 3 8 3 6 3 8 3 8   CHLORIDE mmol/L 108 110* 109* 110* 110* 110*   CO2 mmol/L 24 23 21 20* 22 21   BUN mg/dL 17 16 19 14 20 26*   CREATININE mg/dL 2 28* 2 53* 2 22* 2 26* 2 69* 2 80*   CALCIUM mg/dL 8 8 8 3 7 6* 7 6* 7 7* 7 5*       Results for orders placed during the hospital encounter of 02/02/19   XR chest portable    Narrative CHEST     INDICATION:   chest pain  COMPARISON:  3/18/2015    EXAM PERFORMED/VIEWS:  XR CHEST PORTABLE      FINDINGS:    Cardiomediastinal silhouette appears unremarkable  The lungs are clear  No pneumothorax or pleural effusion  Osseous structures appear within normal limits for patient age  Impression No acute cardiopulmonary disease  Workstation performed: [id number]       No results found for this or any previous visit  No results found for this or any previous visit  No results found for this or any previous visit  Results for orders placed during the hospital encounter of 02/02/19   CT abdomen pelvis wo contrast    Narrative CT ABDOMEN AND PELVIS WITHOUT IV CONTRAST    INDICATION: Flank pain, nausea, vomiting    COMPARISON:  None  TECHNIQUE:  CT examination of the abdomen and pelvis was performed without intravenous contrast   Axial, sagittal, and coronal 2D reformatted images were created from the source data and submitted for interpretation  Radiation dose length product (DLP) for this visit:  322 mGy-cm     This examination, like all CT scans performed in the [facility], was performed utilizing techniques to minimize radiation dose exposure, including the use of iterative   reconstruction and automated exposure control  Enteric contrast was administered  FINDINGS:    ABDOMEN    LOWER CHEST:  No clinically significant abnormality identified in the visualized lower chest     LIVER/BILIARY TREE:  Unremarkable  GALLBLADDER:  No calcified gallstones  No pericholecystic inflammatory change  SPLEEN:  Unremarkable  PANCREAS:  Pancreatic calcifications likely related to pancreatitis in addition to pancreatic atrophy  Suspect dilated pancreatic duct which is unchanged from the prior exam likely from chronic pancreatitis  ADRENAL GLANDS:  Unremarkable  KIDNEYS/URETERS:  Minimal right calyceal dilatation/questionable hydronephrosis  STOMACH AND BOWEL:  Unremarkable  APPENDIX:  No findings to suggest appendicitis  ABDOMINOPELVIC CAVITY:  No ascites or free intraperitoneal air  No lymphadenopathy  VESSELS:  Unremarkable for patient's age  PELVIS    REPRODUCTIVE ORGANS:  Unremarkable for patient's age  URINARY BLADDER:  Unremarkable  ABDOMINAL WALL/INGUINAL REGIONS:  Unremarkable  OSSEOUS STRUCTURES:  No acute fracture or destructive osseous lesion  Disc bulges at L4-5 and L5-S1 causing at least mild spinal canal narrowing  Impression Minimal right calyceal dilatation/questionable hydronephrosis  Workstation performed: [id number]       No results found for this or any previous visit  Portions of the record may have been created with voice recognition software  Occasional wrong word or "sound a like" substitutions may have occurred due to the inherent limitations of voice recognition software  Read the chart carefully and recognize, using context, where substitutions have occurred

## 2019-02-11 NOTE — PROGRESS NOTES
[facility] Internal Medicine Progress Note  Patient: Elayne March 52 y o  male   MRN: [id number]  PCP: Italia Salmon MD  [facility] [address] [facility] 221-01 Encounter: [id number]  Date Of Visit: 02/11/19      Assessment/plan  1  [facility]  like due to atn  possible new baseline of 2 2, creatinine seems to have stabilized  Follow up with nephrology outpt                             2  Type 2 Diabetes- appreciate endocrinology recommendations  Pt had hypoglycemia today but he doesn't always eat as he should  Have ordered meals that will show up with outpt ordering to see if this improves blood glucose through out day  If blood glucose improved tomorrow will d/c to home with outpt follow up with endocrinology      3  Anemia- hemoglobin slowly improving 9 0 continue  Appreciate hematology eval  S/p bone marrow biopsy  Follow up outpt       4  Alcohol abuse- no signs of withdrawal  Pt states he will stop use  dispo- anticipate d/c tomorrow if blood glucose stable    Discussed in full with nursing and dietary          Subjective:   Pt seen and examined  Pt had a low blood glucose this am in the 30s  He was given d50 and he ate breakfast  It improved to 90  He then did not order lunch until asked to and his blood glucose decreased to 40 again  Used interpretor phone to discuss with pt  Pt states he is eating well  Nursing staff states he will not order food until asked to  No other problems no f/c no cp no sob no n/v/d no abd pain  Objective:     Vitals: Blood pressure 131/76, pulse 57, temperature 98 9 °F (37 2 °C), temperature source Tympanic, resp  rate 16, weight 66 3 kg (146 lb 2 6 oz), SpO2 99 %  ,There is no height or weight on file to calculate BMI      Lab, Imaging and other studies:  Results from last 7 days   Lab Units 02/10/19  0448  02/07/19  0522   WBC Thousand/uL 9 33   < > 11 56*   HEMOGLOBIN g/dL 9 0*   < > 6 7*   HEMATOCRIT % 27 1*   < > 20 1*   PLATELETS Thousands/uL 306 < > 327   INR   --   --  1 12    < > = values in this interval not displayed  Results from last 7 days   Lab Units 02/10/19  0448   POTASSIUM mmol/L 4 0   CHLORIDE mmol/L 108   CO2 mmol/L 24   BUN mg/dL 17   CREATININE mg/dL 2 28*   CALCIUM mg/dL 8 8         Lab Results   Component Value Date    BLOODCX No Growth After 5 Days  02/02/2019    BLOODCX No Growth After 5 Days  02/02/2019    BLOODCX No Growth After 5 Days  09/14/2016     Scheduled Meds:   Current Facility-Administered Medications:  folic acid 1 mg Oral Daily Bella Stanley MD   heparin (porcine) 5,000 Units Subcutaneous [facility] Shaji Fierro, CRNP   insulin glargine 6 Units Subcutaneous HS Setphani Jocelyne, DO   insulin lispro 1-5 Units Subcutaneous TID AC JessicaARIANE Carmichael   insulin lispro 2 Units Subcutaneous TID With Meals Susan Becerra, DO   pantoprazole 40 mg Oral Early Morning Bella Stanley MD   thiamine 100 mg Oral Daily Bella Stanley MD     Continuous Infusions:    PRN Meds:       Physical exam:  Physical Exam  General appearance: alert and oriented, in no acute distress  Head: Normocephalic, without obvious abnormality, atraumatic  Eyes: conjunctivae/corneas clear  PERRL, EOM's intact  Fundi benign    Neck: no adenopathy, no carotid bruit, no JVD, supple, symmetrical, trachea midline and thyroid not enlarged, symmetric, no tenderness/mass/nodules  Lungs: clear to auscultation bilaterally  Heart: regular rate and rhythm, S1, S2 normal, no murmur, click, rub or gallop  Abdomen: soft, non-tender; bowel sounds normal; no masses,  no organomegaly  Extremities: extremities normal, warm and well-perfused; no cyanosis, clubbing, or edema  Pulses: 2+ and symmetric  Skin: Skin color, texture, turgor normal  No rashes or lesions  Neurologic: Mental status: Alert, oriented, thought content appropriate      VTE Pharmacologic Prophylaxis: Heparin  VTE Mechanical Prophylaxis: sequential compression device    Counseling / Coordination of Care  Total floor / unit time spent today 20 minutes     Current Length of Stay: 9 day(s)    Current Patient Status: Inpatient       Code Status: Level 1 - Full Code

## 2019-02-12 VITALS
WEIGHT: 148.59 LBS | RESPIRATION RATE: 18 BRPM | TEMPERATURE: 98.1 F | OXYGEN SATURATION: 100 % | HEART RATE: 61 BPM | SYSTOLIC BLOOD PRESSURE: 129 MMHG | DIASTOLIC BLOOD PRESSURE: 83 MMHG

## 2019-02-12 DIAGNOSIS — Z71.89 COMPLEX CARE COORDINATION: Primary | ICD-10-CM

## 2019-02-12 PROBLEM — D63.1 ANEMIA DUE TO CHRONIC KIDNEY DISEASE: Status: RESOLVED | Noted: 2019-02-03 | Resolved: 2019-02-12

## 2019-02-12 PROBLEM — N17.9 AKI (ACUTE KIDNEY INJURY) (HCC): Status: RESOLVED | Noted: 2019-02-02 | Resolved: 2019-02-12

## 2019-02-12 PROBLEM — E87.1 HYPONATREMIA: Status: RESOLVED | Noted: 2019-02-04 | Resolved: 2019-02-12

## 2019-02-12 PROBLEM — G92.8 TOXIC METABOLIC ENCEPHALOPATHY: Status: RESOLVED | Noted: 2019-02-04 | Resolved: 2019-02-12

## 2019-02-12 PROBLEM — N18.9 ANEMIA DUE TO CHRONIC KIDNEY DISEASE: Status: RESOLVED | Noted: 2019-02-03 | Resolved: 2019-02-12

## 2019-02-12 LAB
ANION GAP SERPL CALCULATED.3IONS-SCNC: 10 MMOL/L (ref 4–13)
BUN SERPL-MCNC: 20 MG/DL (ref 5–25)
CALCIUM SERPL-MCNC: 9.1 MG/DL (ref 8.3–10.1)
CHLORIDE SERPL-SCNC: 107 MMOL/L (ref 100–108)
CO2 SERPL-SCNC: 22 MMOL/L (ref 21–32)
CREAT SERPL-MCNC: 2.44 MG/DL (ref 0.6–1.3)
ERYTHROCYTE [DISTWIDTH] IN BLOOD BY AUTOMATED COUNT: 12.7 % (ref 11.6–15.1)
GFR SERPL CREATININE-BSD FRML MDRD: 30 ML/MIN/1.73SQ M
GLUCOSE SERPL-MCNC: 111 MG/DL (ref 65–140)
GLUCOSE SERPL-MCNC: 116 MG/DL (ref 65–140)
HCT VFR BLD AUTO: 30.6 % (ref 36.5–49.3)
HGB BLD-MCNC: 10.2 G/DL (ref 12–17)
MCH RBC QN AUTO: 32.2 PG (ref 26.8–34.3)
MCHC RBC AUTO-ENTMCNC: 33.3 G/DL (ref 31.4–37.4)
MCV RBC AUTO: 97 FL (ref 82–98)
PLATELET # BLD AUTO: 250 THOUSANDS/UL (ref 149–390)
PMV BLD AUTO: 11.4 FL (ref 8.9–12.7)
POTASSIUM SERPL-SCNC: 4.8 MMOL/L (ref 3.5–5.3)
RBC # BLD AUTO: 3.17 MILLION/UL (ref 3.88–5.62)
SODIUM SERPL-SCNC: 139 MMOL/L (ref 136–145)
WBC # BLD AUTO: 8.66 THOUSAND/UL (ref 4.31–10.16)

## 2019-02-12 PROCEDURE — 85027 COMPLETE CBC AUTOMATED: CPT | Performed by: INTERNAL MEDICINE

## 2019-02-12 PROCEDURE — 80048 BASIC METABOLIC PNL TOTAL CA: CPT | Performed by: INTERNAL MEDICINE

## 2019-02-12 PROCEDURE — 82948 REAGENT STRIP/BLOOD GLUCOSE: CPT

## 2019-02-12 PROCEDURE — 99232 SBSQ HOSP IP/OBS MODERATE 35: CPT | Performed by: INTERNAL MEDICINE

## 2019-02-12 PROCEDURE — 99239 HOSP IP/OBS DSCHRG MGMT >30: CPT | Performed by: INTERNAL MEDICINE

## 2019-02-12 RX ORDER — PANTOPRAZOLE SODIUM 40 MG/1
40 TABLET, DELAYED RELEASE ORAL
Qty: 30 TABLET | Refills: 1 | Status: SHIPPED | OUTPATIENT
Start: 2019-02-13 | End: 2019-06-27 | Stop reason: SDUPTHER

## 2019-02-12 RX ORDER — FOLIC ACID 1 MG/1
1 TABLET ORAL DAILY
Qty: 30 TABLET | Refills: 1 | Status: SHIPPED | OUTPATIENT
Start: 2019-02-13 | End: 2019-06-27 | Stop reason: SDUPTHER

## 2019-02-12 RX ORDER — LANOLIN ALCOHOL/MO/W.PET/CERES
100 CREAM (GRAM) TOPICAL DAILY
Qty: 30 TABLET | Refills: 1 | Status: SHIPPED | OUTPATIENT
Start: 2019-02-13 | End: 2019-06-27 | Stop reason: SDUPTHER

## 2019-02-12 RX ORDER — INSULIN GLARGINE 100 [IU]/ML
6 INJECTION, SOLUTION SUBCUTANEOUS
Qty: 180 UNITS | Refills: 1 | Status: SHIPPED | OUTPATIENT
Start: 2019-02-12 | End: 2019-06-27

## 2019-02-12 RX ADMIN — HEPARIN SODIUM 5000 UNITS: 5000 INJECTION INTRAVENOUS; SUBCUTANEOUS at 05:05

## 2019-02-12 RX ADMIN — FOLIC ACID 1 MG: 1 TABLET ORAL at 09:04

## 2019-02-12 RX ADMIN — PANTOPRAZOLE SODIUM 40 MG: 40 TABLET, DELAYED RELEASE ORAL at 05:05

## 2019-02-12 RX ADMIN — Medication 100 MG: at 09:04

## 2019-02-12 NOTE — DISCHARGE SUMMARY
[facility]    Patient Information: Makenna Alfred 52 y o  male MRN: [id number]  Unit/Bed#: [facility] 2 [facility] 87 221-01 Encounter: [id number]    Discharging Physician / Practitioner: Meghan Matos DO  PCP: Shannon Stokes MD  Admission Date: 2/2/2019  Discharge Date: 02/12/19    Disposition:     Home     Reason for Admission: zenaida    Discharge Diagnoses:     Principal Problem (Resolved):    ZENAIDA (acute kidney injury) ([facility] )  Active Problems:    Essential hypertension    Type 2 diabetes mellitus with hyperglycemia, with long-term current use of insulin (HCC)    Alcohol abuse    Chronic pancreatitis ([facility] )    History of atrial fibrillation  Resolved Problems:    Lactic acidosis    Metabolic acidosis    Anemia due to chronic kidney disease    Hyponatremia    Toxic metabolic encephalopathy      Consultations During Hospital Stay:  · Nephrology  · Endocrinology  · Hematology     Procedures Performed:     · Bone marrow biopsy    Significant Findings / Test Results:   Ct Abdomen Pelvis Wo Contrast  Result Date: 2/2/2019  Impression: Minimal right calyceal dilatation/questionable hydronephrosis  Xr Chest Portable  Result Date: 2/2/2019  Impression: No acute cardiopulmonary disease  Ct Head Without Contrast  Result Date: 2/2/2019  Impression: No acute intracranial abnormality  Us Kidney And Bladder  Result Date: 2/4/2019  Impression: Echogenic renal parenchyma consistent with medical renal disease  No hydronephrosis  Incidental Findings:   none    Test Results Pending at Discharge (will require follow up): · Bone marrow biopsy results     Outpatient Tests Requested:  Bmp in 1 week  Hospital Course:     Makenna Alfred is a 52 y o  male patient who originally presented to the hospital on 2/2/2019 due to [facility] with creatinine as high as 7 6  He was assessed by nephrology  They felt it was likely due to atn  His creatinine stabilized at 2 2-2 4  This is likely his new baseline   He likely has ckd3  He will need to follow up with nephrology outpt  He will also need a repeat bmp in 1 week                            There was difficulty controlling hisType 2 Diabetes due to sporadic po intake  He likely had hhnk  He was assessed by  Endocrinology  His lantus was changed to 6 units at night and 2 units of humalog tid  His hypoglycemia resolved with regular po intake  He was educated pt about signs of hypoglycemia and about not administering insulin if blood glucose less than 90      Pt had anemia  Hemoglobin stabilized at 10 2  likely related to etoh abuse and ckd  He was assessed by hematology and is status post bone marrow biopsy  He can follow up with hematology outpt      Pt has Alcohol abuse  There was no signs of withdrawal during his hospital stay  Pt states he will stop use      He was discharged to home  Discharge Day Visit / Exam:     * Please refer to separate progress note for these details *    Discharge instructions/Information to patient and family:   See after visit summary for information provided to patient and family  Provisions for Follow-Up Care:  See after visit summary for information related to follow-up care and any pertinent home health orders  Discharge Statement:  I spent 35 minutes discharging the patient  This time was spent on the day of discharge  I had direct contact with the patient on the day of discharge  Greater than 50% of the total time was spent examining patient, answering all patient questions, arranging and discussing plan of care with patient as well as directly providing post-discharge instructions  Additional time then spent on discharge activities  Discharge Medications:  See after visit summary for reconciled discharge medications provided to patient and family

## 2019-02-12 NOTE — PROGRESS NOTES
[facility] Internal Medicine Progress Note  Patient: Elayne March 52 y o  male   MRN: [id number]  PCP: Italia Salmon MD  Unit/Bed#: [facility] 2 [address] 221-01 Encounter: [id number]  Date Of Visit: 02/12/19      Assessment/plan  1  ZENAIDA-appreciate nephrology recommendations  like due to atn  possible new baseline of 2 2-2 4, creatinine seems to have stabilized  Follow up with nephrology outpt  repeat bmp in 1 week                               2  Type 2 Diabetes- appreciate endocrinology recommendations  Hypoglycemia resolved with regular po intake  Educated pt about signs of hypoglycemia  Educated pt about not administering insulin if blood glucose less than 90       3  Anemia- hemoglobin slowly improving 10 2  Appreciate hematology eval  S/p bone marrow biopsy  Follow up outpt       4  Alcohol abuse- no signs of withdrawal  Pt states he will stop use       dispo- d/c to home       Discussed in full with nursing     Subjective:   Pt seen and examined  Used blue interpretor phone  [id number]  Pt states he feels well  He understands what signs to monitor for in regards to hypoglycemia  He understands that he has to eat 3 meals a day  No f/c no cp no sob no n/v/d no abd pain  He states he has been urinating well  Objective:     Vitals: Blood pressure 129/83, pulse 61, temperature 98 1 °F (36 7 °C), temperature source Temporal, resp  rate 18, weight 67 4 kg (148 lb 9 4 oz), SpO2 100 %  ,There is no height or weight on file to calculate BMI  Lab, Imaging and other studies:  Results from last 7 days   Lab Units 02/12/19  0502  02/07/19  0522   WBC Thousand/uL 8 66   < > 11 56*   HEMOGLOBIN g/dL 10 2*   < > 6 7*   HEMATOCRIT % 30 6*   < > 20 1*   PLATELETS Thousands/uL 250   < > 327   INR   --   --  1 12    < > = values in this interval not displayed       Results from last 7 days   Lab Units 02/12/19  0502   POTASSIUM mmol/L 4 8   CHLORIDE mmol/L 107   CO2 mmol/L 22   BUN mg/dL 20   CREATININE mg/dL 2 44*   CALCIUM mg/dL 9 1 Lab Results   Component Value Date    BLOODCX No Growth After 5 Days  02/02/2019    BLOODCX No Growth After 5 Days  02/02/2019    BLOODCX No Growth After 5 Days  09/14/2016     Scheduled Meds:   Current Facility-Administered Medications:  folic acid 1 mg Oral Daily Jayne Boyd MD   heparin (porcine) 5,000 Units Subcutaneous [facility] ARIANE England   insulin glargine 6 Units Subcutaneous HS Stephani Casanova DO   insulin lispro 1-5 Units Subcutaneous TID AC ARIANE Lopez   insulin lispro 2 Units Subcutaneous TID With Meals Missy Clay DO   pantoprazole 40 mg Oral Early Morning Jayne Boyd MD   thiamine 100 mg Oral Daily Jayne Boyd MD     Continuous Infusions:    PRN Meds:       Physical exam:  Physical Exam  General appearance: alert and oriented, in no acute distress  Head: Normocephalic, without obvious abnormality, atraumatic  Eyes: conjunctivae/corneas clear  PERRL, EOM's intact  Fundi benign    Neck: no adenopathy, no carotid bruit, no JVD, supple, symmetrical, trachea midline and thyroid not enlarged, symmetric, no tenderness/mass/nodules  Lungs: clear to auscultation bilaterally  Heart: regular rate and rhythm, S1, S2 normal, no murmur, click, rub or gallop  Abdomen: soft, non-tender; bowel sounds normal; no masses,  no organomegaly  Extremities: extremities normal, warm and well-perfused; no cyanosis, clubbing, or edema  Pulses: 2+ and symmetric  Skin: Skin color, texture, turgor normal  No rashes or lesions  Neurologic: Mental status: Alert, oriented, thought content appropriate

## 2019-02-12 NOTE — PLAN OF CARE
Problem: Potential for Falls  Goal: Patient will remain free of falls  Description  INTERVENTIONS:  - Assess patient frequently for physical needs  -  Identify cognitive and physical deficits and behaviors that affect risk of falls    -  [place] fall precautions as indicated by assessment   - Educate patient/family on patient safety including physical limitations  - Instruct patient to call for assistance with activity based on assessment  - Modify environment to reduce risk of injury  - Consider OT/PT consult to assist with strengthening/mobility   Outcome: Progressing     Problem: Prexisting or High Potential for Compromised Skin Integrity  Goal: Skin integrity is maintained or improved  Description  INTERVENTIONS:  - Identify patients at risk for skin breakdown  - Assess and monitor skin integrity  - Assess and monitor nutrition and hydration status  - Monitor labs (i e  albumin)  - Assess for incontinence   - Turn and reposition patient  - Assist with mobility/ambulation  - Relieve pressure over bony prominences  - Avoid friction and shearing  - Provide appropriate hygiene as needed including keeping skin clean and dry  - Evaluate need for skin moisturizer/barrier cream  - Collaborate with interdisciplinary team (i e  Nutrition, Rehabilitation, etc )   - Patient/family teaching   Outcome: Progressing     Problem: PAIN - ADULT  Goal: Verbalizes/displays adequate comfort level or baseline comfort level  Description  Interventions:  - Encourage patient to monitor pain and request assistance  - Assess pain using appropriate pain scale  - Administer analgesics based on type and severity of pain and evaluate response  - Implement non-pharmacological measures as appropriate and evaluate response  - Consider cultural and social influences on pain and pain management  - Notify physician/advanced practitioner if interventions unsuccessful or patient reports new pain   Outcome: Progressing     Problem: INFECTION - ADULT  Goal: Absence or prevention of progression during hospitalization  Description  INTERVENTIONS:  - Assess and monitor for signs and symptoms of infection  - Monitor lab/diagnostic results  - Monitor all insertion sites, i e  indwelling lines, tubes, and drains  - Monitor endotracheal (as able) and nasal secretions for changes in amount and color  - [place] appropriate cooling/warming therapies per order  - Administer medications as ordered  - Instruct and encourage patient and family to use good hand hygiene technique  - Identify and instruct in appropriate isolation precautions for identified infection/condition   Outcome: Progressing  Goal: Absence of fever/infection during neutropenic period  Description  INTERVENTIONS:  - Monitor WBC  - Implement neutropenic guidelines   Outcome: Progressing     Problem: SAFETY ADULT  Goal: Maintain or return to baseline ADL function  Description  INTERVENTIONS:  -  Assess patient's ability to carry out ADLs; assess patient's baseline for ADL function and identify physical deficits which impact ability to perform ADLs (bathing, care of mouth/teeth, toileting, grooming, dressing, etc )  - Assess/evaluate cause of self-care deficits   - Assess range of motion  - Assess patient's mobility; develop plan if impaired  - Assess patient's need for assistive devices and provide as appropriate  - Encourage maximum independence but intervene and supervise when necessary  ¯ Involve family in performance of ADLs  ¯ Assess for home care needs following discharge   ¯ Request OT consult to assist with ADL evaluation and planning for discharge  ¯ Provide patient education as appropriate   Outcome: Progressing  Goal: Maintain or return mobility status to optimal level  Description  INTERVENTIONS:  - Assess patient's baseline mobility status (ambulation, transfers, stairs, etc )    - Identify cognitive and physical deficits and behaviors that affect mobility  - Identify mobility aids required to assist with transfers and/or ambulation (gait belt, sit-to-stand, lift, walker, cane, etc )  - [place] fall precautions as indicated by assessment  - Record patient progress and toleration of activity level on Mobility SBAR; progress patient to next Phase/Stage  - Instruct patient to call for assistance with activity based on assessment  - Request Rehabilitation consult to assist with strengthening/weightbearing, etc    Outcome: Progressing     Problem: DISCHARGE PLANNING  Goal: Discharge to home or other facility with appropriate resources  Description  INTERVENTIONS:  - Identify barriers to discharge w/patient and caregiver  - Arrange for needed discharge resources and transportation as appropriate  - Identify discharge learning needs (meds, wound care, etc )  - Arrange for interpretive services to assist at discharge as needed  - Refer to Case Management Department for coordinating discharge planning if the patient needs post-hospital services based on physician/advanced practitioner order or complex needs related to functional status, cognitive ability, or social support system   Outcome: Progressing     Problem: Knowledge Deficit  Goal: Patient/family/caregiver demonstrates understanding of disease process, treatment plan, medications, and discharge instructions  Description  Complete learning assessment and assess knowledge base    Interventions:  - Provide teaching at level of understanding  - Provide teaching via preferred learning methods   Outcome: Progressing     Problem: GENITOURINARY - ADULT  Goal: Maintains or returns to baseline urinary function  Description  INTERVENTIONS:  - Assess urinary function  - Encourage oral fluids to ensure adequate hydration  - Administer IV fluids as ordered to ensure adequate hydration  - Administer ordered medications as needed  - Offer frequent toileting  - Follow urinary retention protocol if ordered   Outcome: Progressing  Goal: Absence of urinary retention  Description  INTERVENTIONS:  - Assess patient?s ability to void and empty bladder  - Monitor I/O  - Bladder scan as needed  - Discuss with physician/AP medications to alleviate retention as needed  - Discuss catheterization for long term situations as appropriate   Outcome: Progressing     Problem: METABOLIC, FLUID AND ELECTROLYTES - ADULT  Goal: Electrolytes maintained within normal limits  Description  INTERVENTIONS:  - Monitor labs and assess patient for signs and symptoms of electrolyte imbalances  - Administer electrolyte replacement as ordered  - Monitor response to electrolyte replacements, including repeat lab results as appropriate  - Instruct patient on fluid and nutrition as appropriate     Outcome: Progressing  Goal: Fluid balance maintained  Description  INTERVENTIONS:  - Monitor labs and assess for signs and symptoms of volume excess or deficit  - Monitor I/O and WT  - Instruct patient on fluid and nutrition as appropriate    Outcome: Progressing  Goal: Glucose maintained within target range  Description  INTERVENTIONS:  - Monitor Blood Glucose as ordered  - Assess for signs and symptoms of hyperglycemia and hypoglycemia  - Administer ordered medications to maintain glucose within target range  - Assess nutritional intake and initiate nutrition service referral as needed     Outcome: Progressing     Problem: HEMATOLOGIC - ADULT  Goal: Maintains hematologic stability  Description  INTERVENTIONS  - Assess for signs and symptoms of bleeding or hemorrhage  - Monitor labs  - Administer supportive blood products/factors as ordered and appropriate     Outcome: Progressing     Problem: Nutrition/Hydration-ADULT  Goal: Nutrient/Hydration intake appropriate for improving, restoring or maintaining nutritional needs  Description  Monitor and assess patient's nutrition/hydration status for malnutrition (ex- brittle hair, bruises, dry skin, pale skin and conjunctiva, muscle wasting, smooth red tongue, and disorientation)  Collaborate with interdisciplinary team and initiate plan and interventions as ordered  Monitor patient's weight and dietary intake as ordered or per policy  Utilize nutrition screening tool and intervene per policy  Determine patient's food preferences and provide high-protein, high-caloric foods as appropriate       INTERVENTIONS:  - Monitor oral intake, urinary output, labs, and treatment plans  - Assess nutrition and hydration status and recommend course of action  - Evaluate amount of meals eaten  - Assist patient with eating if necessary   - Allow adequate time for meals  - Recommend/ encourage appropriate diets, oral nutritional supplements, and vitamin/mineral supplements  - Order, calculate, and assess calorie counts as needed  - Recommend, monitor, and adjust tube feedings and TPN/PPN based on assessed needs  - Assess need for intravenous fluids  - Provide specific nutrition/hydration education as appropriate  - Include patient/family/caregiver in decisions related to nutrition   Outcome: Progressing     Problem: DISCHARGE PLANNING - CARE MANAGEMENT  Goal: Discharge to post-acute care or home with appropriate resources  Description  INTERVENTIONS:  - Conduct assessment to determine patient/family and health care team treatment goals, and need for post-acute services based on payer coverage, community resources, and patient preferences, and barriers to discharge  - Address psychosocial, clinical, and financial barriers to discharge as identified in assessment in conjunction with the patient/family and health care team  - Arrange appropriate level of post-acute services according to patient?s   needs and preference and payer coverage in collaboration with the physician and health care team  - Communicate with and update the patient/family, physician, and health care team regarding progress on the discharge plan  - Arrange appropriate transportation to post-acute venues   Outcome: Progressing

## 2019-02-12 NOTE — PROGRESS NOTES
NEPHROLOGY PROGRESS NOTE   Early Crew 52 y o  male MRN: [id number]  Unit/Bed#: [facility] 2 [address] [id number] Encounter: [id number]  Reason for Consult: ZENAIDA/CKD    ASSESSMENT AND PLAN:  Patient is 80-year-old male with suspected CKD stage 2 to three a with baseline creatinine 1 1 in 2016, diabetes, alcohol abuse, presented with generalized weakness  We are consulted for ZENAIDA/CKD management      ZENAIDA on suspected CKD stage 2 to 3A, baseline creatinine 1 1 in 2016, no recent labs available since then   -creatinine 7 6 on admission seems to have significantly improved and plateaued around 2 0-5 4 %period% creatinine 2 4 today  -ZENAIDA suspected to be multifactorial secondary to prerenal in the setting of uncontrolled hyperglycemia, nausea, vomiting, diarrhea, likely progressed to ATN  -avoid nephrotoxins or NSAIDs  -urinalysis this admission showed no hematuria or proteinuria  -once renal function remains stable, check UPC ratio which can be done as an outpatient   -monitor intake and output   -renal ultrasound shows both kidneys normal size, diffusely echogenic renal parenchyma consistent with CKD, no hydronephrosis or stones      Proteinuria on urinalysis in the past  -upc ratio to be done as an outpatient if renal function remains stable   -serum immunofixation negative, UPEP negative  -avoid Ace inhibitor for now due to ZENAIDA  -hemoglobin A1c 8 8 in April 2018       Severe anemia, transfuse p r n  For hemoglobin less than seven  Continue to closely monitor   -status post bone marrow biopsy, results to follow   -hematology follow-up      Hypertension, blood pressure overall acceptable  Avoid low BP   -home medication list shows lisinopril/HCTZ as outpatient  Currently avoiding all antihypertensive medications  Continue to monitor blood pressure   -continue to hold lisinopril/HCTZ on discharge     Mild right caliceal dilatation/questionable hydronephrosis on CT scan reported    Renal ultrasound did not show hydronephrosis  Discussed with primary team    SUBJECTIVE:  Patient seen and examined at bedside  No chest pain, shortness of breath, nausea, vomiting, abdominal pain or diarrhea       OBJECTIVE:  Current Weight: Weight - Scale: 67 4 kg (148 lb 9 4 oz)  Vitals:    02/12/19 0718   BP: 129/83   Pulse: 61   Resp: 18   Temp: 98 1 °F (36 7 °C)   SpO2: 100%       Intake/Output Summary (Last 24 hours) at 2/12/2019 1109  Last data filed at 2/12/2019 0550  Gross per 24 hour   Intake 236 ml   Output 800 ml   Net -564 ml       Physical Examination:  General:  Lying in bed, no acute distress   Eyes:  Mild conjunctival pallor present  ENT:  External examination of ears and nose unremarkable  Neck:  Supple  Respiratory:  Bilateral air entry present  CVS:  S1, S2 present  GI:  Soft, nontender, nondistended  CNS:  Active alert oriented x3  Extremities:  No edema in legs  Skin:  No new rash in legs    Medications:    Current Facility-Administered Medications:     folic acid (FOLVITE) tablet 1 mg, 1 mg, Oral, Daily, Kamla Lambert MD, 1 mg at 02/12/19 0904    heparin (porcine) subcutaneous injection 5,000 Units, 5,000 Units, Subcutaneous, Q8H [address], 5,000 Units at 02/12/19 0505 **AND** Platelet count, , , Once, ARIANE Terrazas    insulin glargine (LANTUS) subcutaneous injection 6 Units 0 06 mL, 6 Units, Subcutaneous, HS, Stephani Casanova, DO, 6 Units at 02/11/19 2112    insulin lispro (HumaLOG) 100 units/mL subcutaneous injection 1-5 Units, 1-5 Units, Subcutaneous, TID AC, 4 Units at 02/11/19 1614 **AND** Fingerstick Glucose (POCT), , , TID AC, ARIANE Lopez    insulin lispro (HumaLOG) 100 units/mL subcutaneous injection 2 Units, 2 Units, Subcutaneous, TID With Meals, Kathy Stokes DO, 2 Units at 02/11/19 1615    pantoprazole (PROTONIX) EC tablet 40 mg, 40 mg, Oral, [place], Kamla Lambert MD, 40 mg at 02/12/19 0505    thiamine (VITAMIN B1) tablet 100 mg, 100 mg, Oral, Daily, Kamla Lambert MD, 100 mg at 02/12/19 0409    Laboratory Results:  Results from last 7 days   Lab Units 02/12/19  0502 02/10/19  0448 02/09/19  0504 02/08/19  0455 02/07/19  0522 02/06/19  0556   WBC Thousand/uL 8 66 9 33 10 08 12 12* 11 56* 13 58*   HEMOGLOBIN g/dL 10 2* 9 0* 8 3* 8 1* 6 7* 7 2*   HEMATOCRIT % 30 6* 27 1* 24 7* 23 7* 20 1* 21 3*   PLATELETS Thousands/uL 250 306 316 308 327 406*   POTASSIUM mmol/L 4 8 4 0 3 9 3 8 3 6 3 8   CHLORIDE mmol/L 107 108 110* 109* 110* 110*   CO2 mmol/L 22 24 23 21 20* 22   BUN mg/dL 20 17 16 19 14 20   CREATININE mg/dL 2 44* 2 28* 2 53* 2 22* 2 26* 2 69*   CALCIUM mg/dL 9 1 8 8 8 3 7 6* 7 6* 7 7*       Results for orders placed during the hospital encounter of 02/02/19   XR chest portable    Narrative CHEST     INDICATION:   chest pain  COMPARISON:  3/18/2015    EXAM PERFORMED/VIEWS:  XR CHEST PORTABLE      FINDINGS:    Cardiomediastinal silhouette appears unremarkable  The lungs are clear  No pneumothorax or pleural effusion  Osseous structures appear within normal limits for patient age  Impression No acute cardiopulmonary disease  Workstation performed: [id number]       No results found for this or any previous visit  No results found for this or any previous visit  No results found for this or any previous visit  Results for orders placed during the hospital encounter of 02/02/19   CT abdomen pelvis wo contrast    Narrative CT ABDOMEN AND PELVIS WITHOUT IV CONTRAST    INDICATION: Flank pain, nausea, vomiting    COMPARISON:  None  TECHNIQUE:  CT examination of the abdomen and pelvis was performed without intravenous contrast   Axial, sagittal, and coronal 2D reformatted images were created from the source data and submitted for interpretation  Radiation dose length product (DLP) for this visit:  322 mGy-cm     This examination, like all CT scans performed in the [place], was performed utilizing techniques to minimize radiation dose exposure, including the use of iterative   reconstruction and automated exposure control  Enteric contrast was administered  FINDINGS:    ABDOMEN    LOWER CHEST:  No clinically significant abnormality identified in the visualized lower chest     LIVER/BILIARY TREE:  Unremarkable  GALLBLADDER:  No calcified gallstones  No pericholecystic inflammatory change  SPLEEN:  Unremarkable  PANCREAS:  Pancreatic calcifications likely related to pancreatitis in addition to pancreatic atrophy  Suspect dilated pancreatic duct which is unchanged from the prior exam likely from chronic pancreatitis  ADRENAL GLANDS:  Unremarkable  KIDNEYS/URETERS:  Minimal right calyceal dilatation/questionable hydronephrosis  STOMACH AND BOWEL:  Unremarkable  APPENDIX:  No findings to suggest appendicitis  ABDOMINOPELVIC CAVITY:  No ascites or free intraperitoneal air  No lymphadenopathy  VESSELS:  Unremarkable for patient's age  PELVIS    REPRODUCTIVE ORGANS:  Unremarkable for patient's age  URINARY BLADDER:  Unremarkable  ABDOMINAL WALL/INGUINAL REGIONS:  Unremarkable  OSSEOUS STRUCTURES:  No acute fracture or destructive osseous lesion  Disc bulges at L4-5 and L5-S1 causing at least mild spinal canal narrowing  Impression Minimal right calyceal dilatation/questionable hydronephrosis  Workstation performed: [id number]       No results found for this or any previous visit  Portions of the record may have been created with voice recognition software  Occasional wrong word or "sound a like" substitutions may have occurred due to the inherent limitations of voice recognition software  Read the chart carefully and recognize, using context, where substitutions have occurred

## 2019-02-13 ENCOUNTER — TELEPHONE (OUTPATIENT)
Dept: NEPHROLOGY | Facility: CLINIC | Age: 49
End: 2019-02-13

## 2019-02-13 NOTE — TELEPHONE ENCOUNTER
I called and schedule patient with Ana Lilia Kennedy at the [facility] office for 02/26/19 at 9:30am  I explained  that I will be mailing out the labs that need to be done prior to his visit and also sending an appointment card with the date and address of facility  Caller understood and agreed to what we spoke about

## 2019-02-13 NOTE — TELEPHONE ENCOUNTER
----- Message from Jerry Conway MD sent at 2/6/2019  9:33 AM EST -----  [facility] extender or MD in 4 weeks with repeat labs (CBC, renal function panel, PTH, UPC)    Thanks

## 2019-02-14 ENCOUNTER — TELEPHONE (OUTPATIENT)
Dept: FAMILY MEDICINE CLINIC | Facility: CLINIC | Age: 49
End: 2019-02-14

## 2019-02-14 LAB — SCAN RESULT: NORMAL

## 2019-02-15 ENCOUNTER — TRANSITIONAL CARE MANAGEMENT (OUTPATIENT)
Dept: FAMILY MEDICINE CLINIC | Facility: CLINIC | Age: 49
End: 2019-02-15

## 2019-02-17 PROBLEM — N17.9 ACUTE RENAL FAILURE (ARF) (HCC): Status: ACTIVE | Noted: 2019-02-17

## 2019-02-17 PROBLEM — D64.9 ANEMIA: Status: ACTIVE | Noted: 2019-02-17

## 2019-02-18 ENCOUNTER — APPOINTMENT (OUTPATIENT)
Dept: LAB | Facility: HOSPITAL | Age: 49
End: 2019-02-18
Payer: COMMERCIAL

## 2019-02-18 ENCOUNTER — PATIENT OUTREACH (OUTPATIENT)
Dept: FAMILY MEDICINE CLINIC | Facility: CLINIC | Age: 49
End: 2019-02-18

## 2019-02-18 DIAGNOSIS — N18.30 ANEMIA DUE TO STAGE 3 CHRONIC KIDNEY DISEASE (HCC): ICD-10-CM

## 2019-02-18 DIAGNOSIS — D63.1 ANEMIA DUE TO STAGE 3 CHRONIC KIDNEY DISEASE (HCC): ICD-10-CM

## 2019-02-18 DIAGNOSIS — N17.9 AKI (ACUTE KIDNEY INJURY) (HCC): ICD-10-CM

## 2019-02-18 LAB
ANION GAP SERPL CALCULATED.3IONS-SCNC: 9 MMOL/L (ref 4–13)
BUN SERPL-MCNC: 12 MG/DL (ref 5–25)
CALCIUM SERPL-MCNC: 8.8 MG/DL (ref 8.3–10.1)
CHLORIDE SERPL-SCNC: 108 MMOL/L (ref 100–108)
CO2 SERPL-SCNC: 26 MMOL/L (ref 21–32)
CREAT SERPL-MCNC: 1.46 MG/DL (ref 0.6–1.3)
ERYTHROCYTE [DISTWIDTH] IN BLOOD BY AUTOMATED COUNT: 12.2 % (ref 11.6–15.1)
GFR SERPL CREATININE-BSD FRML MDRD: 56 ML/MIN/1.73SQ M
GLUCOSE SERPL-MCNC: 99 MG/DL (ref 65–140)
HCT VFR BLD AUTO: 32.9 % (ref 36.5–49.3)
HGB BLD-MCNC: 10.6 G/DL (ref 12–17)
MCH RBC QN AUTO: 31.6 PG (ref 26.8–34.3)
MCHC RBC AUTO-ENTMCNC: 32.2 G/DL (ref 31.4–37.4)
MCV RBC AUTO: 98 FL (ref 82–98)
PLATELET # BLD AUTO: 214 THOUSANDS/UL (ref 149–390)
PMV BLD AUTO: 10.6 FL (ref 8.9–12.7)
POTASSIUM SERPL-SCNC: 4.6 MMOL/L (ref 3.5–5.3)
RBC # BLD AUTO: 3.35 MILLION/UL (ref 3.88–5.62)
SODIUM SERPL-SCNC: 143 MMOL/L (ref 136–145)
WBC # BLD AUTO: 11.99 THOUSAND/UL (ref 4.31–10.16)

## 2019-02-18 PROCEDURE — 85027 COMPLETE CBC AUTOMATED: CPT

## 2019-02-18 PROCEDURE — 80048 BASIC METABOLIC PNL TOTAL CA: CPT

## 2019-02-18 PROCEDURE — 36415 COLL VENOUS BLD VENIPUNCTURE: CPT

## 2019-02-18 NOTE — PROGRESS NOTES
LSW was asked to follow up with pt for TCM visit  Pt no showed his TCM visit on this day  LSW is available for additional support as needed via order

## 2019-02-21 LAB — SCAN RESULT: NORMAL

## 2019-02-25 ENCOUNTER — TELEPHONE (OUTPATIENT)
Dept: NEPHROLOGY | Facility: CLINIC | Age: 49
End: 2019-02-25

## 2019-02-26 ENCOUNTER — OFFICE VISIT (OUTPATIENT)
Dept: NEPHROLOGY | Facility: CLINIC | Age: 49
End: 2019-02-26
Payer: COMMERCIAL

## 2019-02-26 VITALS
BODY MASS INDEX: 25.33 KG/M2 | SYSTOLIC BLOOD PRESSURE: 152 MMHG | WEIGHT: 152 LBS | HEIGHT: 65 IN | HEART RATE: 72 BPM | DIASTOLIC BLOOD PRESSURE: 80 MMHG

## 2019-02-26 DIAGNOSIS — E11.65 TYPE 2 DIABETES MELLITUS WITH HYPERGLYCEMIA, WITH LONG-TERM CURRENT USE OF INSULIN (HCC): ICD-10-CM

## 2019-02-26 DIAGNOSIS — N17.9 ACUTE RENAL FAILURE, UNSPECIFIED ACUTE RENAL FAILURE TYPE (HCC): ICD-10-CM

## 2019-02-26 DIAGNOSIS — N18.30 CKD (CHRONIC KIDNEY DISEASE), STAGE III (HCC): ICD-10-CM

## 2019-02-26 DIAGNOSIS — I10 ESSENTIAL HYPERTENSION: ICD-10-CM

## 2019-02-26 DIAGNOSIS — F10.10 ALCOHOL ABUSE: ICD-10-CM

## 2019-02-26 DIAGNOSIS — Z79.4 TYPE 2 DIABETES MELLITUS WITH HYPERGLYCEMIA, WITH LONG-TERM CURRENT USE OF INSULIN (HCC): ICD-10-CM

## 2019-02-26 DIAGNOSIS — D64.9 ANEMIA, UNSPECIFIED TYPE: Primary | ICD-10-CM

## 2019-02-26 DIAGNOSIS — I48.0 PAROXYSMAL A-FIB (HCC): ICD-10-CM

## 2019-02-26 PROCEDURE — 99214 OFFICE O/P EST MOD 30 MIN: CPT | Performed by: NURSE PRACTITIONER

## 2019-02-26 RX ORDER — LISINOPRIL AND HYDROCHLOROTHIAZIDE 20; 12.5 MG/1; MG/1
1 TABLET ORAL DAILY
Qty: 30 TABLET | Refills: 3 | Status: SHIPPED | OUTPATIENT
Start: 2019-02-26 | End: 2019-06-27 | Stop reason: SDUPTHER

## 2019-02-26 NOTE — PROGRESS NOTES
OFFICE FOLLOW UP - Nephrology   [facility] 52 y o  male MRN: [id number]       ASSESSMENT and PLAN:  Jumana Rod was seen today for follow-up  Diagnoses and all orders for this visit:    Anemia, unspecified type  -     CBC; Future  -     Ambulatory referral to Hematology / Oncology; Future    Acute renal failure, unspecified acute renal failure type ([facility] )  -     Basic metabolic panel; Future    CKD (chronic kidney disease), stage III ([facility])  -     Basic metabolic panel; Future  -     Phosphorus; Future  -     Magnesium; Future  -     Urinalysis with microscopic  -     Protein electrophoresis, urine; Future    Type 2 diabetes mellitus with hyperglycemia, with long-term current use of insulin ([facility])    Essential hypertension  -     lisinopril-hydrochlorothiazide (PRINZIDE,ZESTORETIC) 20-12 5 MG per tablet; Take 1 tablet by mouth daily    Paroxysmal A-fib ([facility] )    Alcohol abuse        Acute kidney injury:  Hospitalized 02/02-02/12  Secondary to ATN in the setting of hyperglycemia, nausea, vomiting, and diarrhea  His peak  creatinine was 7 4  Upon discharge it was 2 4   -creatinine on most recent blood work 2/18 improved to 1 46   -previous urinalysis while hospitalized was negative for hematuria or protein   -renal ultrasound was negative for hydronephrosis but showed diffuse echogenicity likely representing medical renal disease   -continue to avoid nephrotoxins/NSAIDs  CKD stage 3: It appears his baseline creatinine in 2016 was 1 1  His chronic kidney disease is likely secondary to hypertensive nephrosclerosis and diabetic nephropathy   -has not previously followed with Nephrology   -may have new higher baseline   -On most recent blood work on 02/18, creatinine was 1 46 and his GFR was 56    -will check BMP, UA with micro, UPCR, phos, PTH, and Mg prior to next appointment  Type 2 diabetes: Does not follow OP with Endocrinology  States his blood sugars very as low as 90 and as high as 300's   He checks his blood sugar one time per day     -he remains on subcutaneous insulin   -it is recommended that he follows outpatient with Endocrinology  Hypertension and CKD 3:  His blood pressure is elevated in the office today  His blood pressure was 152/80  He denies any chest pain or feeling dizzy/lightheaded, or headaches  - I will resume his lisinopril/HCTZ  We will follow up with blood work in 1-2 weeks   -goal systolic blood pressure 517-915'D  - avoid hypotension  Anemia and CKD 3:  He is status post bone marrow biopsy while hospitalized  He reports no obvious signs of bleeding   -previous SPEP/UPEP was negative   -he is to have outpatient follow-up with hematology  -his last hemoglobin was stable at 10 6   - check CBC prior to next appointment  History of AFib:  Per medical record he is not currently on anticoagulation  Alcohol abuse:  Denies current use   -remains on folic acid and thiamine  HPI: Early Crew is a 52 y o  male with past medical history of CKD stage 3, hypertension, diabetes, alcohol abuse, pancreatitis, and atrial fibrillation who is here for hospital follow-up for ZENAIDA  He was subsequently hospitalized from 02/02-02/12 secondary to acute kidney injury  Peak creatinine was 7 4 and by discharge his creatinine was 2 4  It appears that his baseline creatinine was 1 1 in 2016  His acute kidney injury with suspected due to ATN in the setting of hyperglycemia, nausea, vomiting, and diarrhea  He has not previously followed with Nephrology  Upon discharge his Ace inhibitor and HCTZ were placed on hold  His brother is here for translation  The patient only speaks Maltese  Since his hospitalization he has had a few periods where he was not "feeling well"  The ambulance was called once because he was poorly responsive and it was found that his blood sugar was low  His blood sugar is frequently low  He checks his blood sugars one time per day  He takes insulin for this   He states that his blood sugar varies as high as 200-300's and as low as   He does not see a Endocrinologist and states that his PCP manages his diabetes  He has not seen his PCP in awhile  He denies the use of NSAID's  Denies drinking alcohol  Denies issues with urination  Denies any previous renal history  ROS:   A complete review of systems was done  Pertinent positives and negatives as noted in the HPI, otherwise the review of systems is negative  Allergies: Patient has no known allergies  Medications:   Current Outpatient Medications:     folic acid (FOLVITE) 1 mg tablet, Take 1 tablet (1 mg total) by mouth daily, Disp: 30 tablet, Rfl: 1    insulin glargine (LANTUS) 100 units/mL subcutaneous injection, Inject 6 Units under the skin daily at bedtime for 30 days, Disp: 180 Units, Rfl: 1    insulin lispro (HumaLOG) 100 units/mL injection, Inject 2 Units under the skin 3 (three) times a day with meals for 30 days, Disp: 1 8 mL, Rfl: 1    pantoprazole (PROTONIX) 40 mg tablet, Take 1 tablet (40 mg total) by mouth daily in the early morning, Disp: 30 tablet, Rfl: 1    thiamine 100 MG tablet, Take 1 tablet (100 mg total) by mouth daily, Disp: 30 tablet, Rfl: 1    UNIFINE PENTIPS 32G X 4 MM MISC, TO BE USED WITH BASAGLAR, Disp: 100 each, Rfl: 0    lisinopril-hydrochlorothiazide (PRINZIDE,ZESTORETIC) 20-12 5 MG per tablet, Take 1 tablet by mouth daily, Disp: 30 tablet, Rfl: 3    Past Medical History:   Diagnosis Date    Alcohol abuse     Chronic pancreatitis (HCC)     Diabetes mellitus (HCC)     Type 2    Hypertension     Pancreatitis     Paroxysmal A-fib (HCC)     Thrombocytopenia (HCC)      Past Surgical History:   Procedure Laterality Date    IR BONE MARROW BIOPSY/ASPIRATION  2/7/2019     Family History   Problem Relation Age of Onset    Diabetes Mother     Hypertension Mother     Hyperlipidemia Father       reports that he has quit smoking   He has never used smokeless tobacco  He reports that he drank alcohol  He reports that he does not use drugs  Physical Exam:   Vitals:    02/26/19 0926 02/26/19 0946   BP:  152/80   BP Location:  Left arm   Pulse:  72   Weight: 68 9 kg (152 lb)    Height: 5' 5" (1 651 m)      Body mass index is 25 29 kg/m²  General: no acute distress   Eyes: conjunctivae pink, anicteric sclerae  ENT: mucous membranes moist  Neck: supple, no JVD  Chest: clear to auscultation bilaterally with no wheezes, rale or rhochi  CVS: regular rate and rhythm   Abdomen: soft, non-tender, non-distended  Extremities: no lower extremity edema   Skin: no rash  Neuro: awake and alert       Lab Results:  Results for orders placed or performed in visit on 02/18/19   CBC   Result Value Ref Range    WBC 11 99 (H) 4 31 - 10 16 Thousand/uL    RBC 3 35 (L) 3 88 - 5 62 Million/uL    Hemoglobin 10 6 (L) 12 0 - 17 0 g/dL    Hematocrit 32 9 (L) 36 5 - 49 3 %    MCV 98 82 - 98 fL    MCH 31 6 26 8 - 34 3 pg    MCHC 32 2 31 4 - 37 4 g/dL    RDW 12 2 11 6 - 15 1 %    Platelets 283 419 - 853 Thousands/uL    MPV 10 6 8 9 - 12 7 fL   Basic metabolic panel   Result Value Ref Range    Sodium 143 136 - 145 mmol/L    Potassium 4 6 3 5 - 5 3 mmol/L    Chloride 108 100 - 108 mmol/L    CO2 26 21 - 32 mmol/L    ANION GAP 9 4 - 13 mmol/L    BUN 12 5 - 25 mg/dL    Creatinine 1 46 (H) 0 60 - 1 30 mg/dL    Glucose 99 65 - 140 mg/dL    Calcium 8 8 8 3 - 10 1 mg/dL    eGFR 56 ml/min/1 73sq m             Invalid input(s): ALBUMIN      Portions of the record may have been created with voice recognition software  Occasional wrong word or "sound a like" substitutions may have occurred due to the inherent limitations of voice recognition software  Read the chart carefully and recognize, using context, where substitutions have occurred  If you have any questions, please contact the dictating provider

## 2019-02-26 NOTE — PATIENT INSTRUCTIONS
We discussed your current kidney function  You have chronic kidney disease  Your most recent creatinine was 1 46  Please avoid motrin products  Drink plenty of water  Avoid salt in her diet  Please follow-up with your family doctor regarding her diabetes  Please check your blood sugars 3 times a day  If able to, please check your blood pressure periodically and call the office if it is high >150 or low <110  If you are able to, you can purchase a blood pressure cuff and keep a log of her blood pressures  We will restart you on your lisinopril/HCTZ medication today  Your blood pressure is slightly elevated in the office today  We would like you to follow up with Hematology for your bone marrow biopsy result  It would also benefit you to see an endocrinologist for her diabetes management  Please continue to avoid alcohol abuse

## 2019-03-04 ENCOUNTER — TELEPHONE (OUTPATIENT)
Dept: FAMILY MEDICINE CLINIC | Facility: CLINIC | Age: 49
End: 2019-03-04

## 2019-03-04 NOTE — TELEPHONE ENCOUNTER
[facility] called to request Amb Doctor to Doctor   DX [id number]  Addy date was on 2/26/2019  [phone] Fax

## 2019-03-05 PROBLEM — N17.9 ACUTE RENAL FAILURE (ARF) (HCC): Status: RESOLVED | Noted: 2019-02-17 | Resolved: 2019-03-05

## 2019-03-08 DIAGNOSIS — N18.30 CHRONIC KIDNEY DISEASE, STAGE III (MODERATE) (HCC): Primary | ICD-10-CM

## 2019-03-08 NOTE — TELEPHONE ENCOUNTER
It was an ambulatory order they needed, it is taken care of      Gaby, this patient is already seeing nephro, you don't need to do anything with this referral

## 2019-03-18 ENCOUNTER — TELEPHONE (OUTPATIENT)
Dept: HEMATOLOGY ONCOLOGY | Facility: CLINIC | Age: 49
End: 2019-03-18

## 2019-03-18 NOTE — TELEPHONE ENCOUNTER
Called patient for a third time, and call went to voicemail  Left message for patient to call the office to reschedule missed new patient apt w/Dr Gonzalez Lowery today that was at 2:40

## 2019-03-18 NOTE — TELEPHONE ENCOUNTER
Called patient for a second time regarding missed apt today w/Dr Macie Harding  A woman answered, however, she did not speak english so I could not communicate with her  I will call back again later

## 2019-03-18 NOTE — TELEPHONE ENCOUNTER
Called patient regarding apt today 03/18 at 2:40 w/Dr Tang Tariq  Call went to voicemail  Left message for patient to please call the office to reschedule missed new patient apt

## 2019-06-03 ENCOUNTER — TELEPHONE (OUTPATIENT)
Dept: NEPHROLOGY | Facility: CLINIC | Age: 49
End: 2019-06-03

## 2019-06-27 ENCOUNTER — OFFICE VISIT (OUTPATIENT)
Dept: FAMILY MEDICINE CLINIC | Facility: CLINIC | Age: 49
End: 2019-06-27

## 2019-06-27 VITALS
DIASTOLIC BLOOD PRESSURE: 92 MMHG | HEIGHT: 65 IN | RESPIRATION RATE: 16 BRPM | SYSTOLIC BLOOD PRESSURE: 160 MMHG | WEIGHT: 150.6 LBS | BODY MASS INDEX: 25.09 KG/M2 | TEMPERATURE: 98 F | HEART RATE: 94 BPM | OXYGEN SATURATION: 98 %

## 2019-06-27 DIAGNOSIS — I48.0 PAROXYSMAL A-FIB (HCC): ICD-10-CM

## 2019-06-27 DIAGNOSIS — N18.30 CKD (CHRONIC KIDNEY DISEASE), STAGE III (HCC): ICD-10-CM

## 2019-06-27 DIAGNOSIS — E11.65 TYPE 2 DIABETES MELLITUS WITH HYPERGLYCEMIA, WITH LONG-TERM CURRENT USE OF INSULIN (HCC): ICD-10-CM

## 2019-06-27 DIAGNOSIS — F10.10 ALCOHOL ABUSE: ICD-10-CM

## 2019-06-27 DIAGNOSIS — Z76.0 ENCOUNTER FOR MEDICATION REFILL: Primary | ICD-10-CM

## 2019-06-27 DIAGNOSIS — I10 ESSENTIAL HYPERTENSION: ICD-10-CM

## 2019-06-27 DIAGNOSIS — E11.9 TYPE 2 DIABETES MELLITUS WITHOUT COMPLICATION, WITH LONG-TERM CURRENT USE OF INSULIN (HCC): ICD-10-CM

## 2019-06-27 DIAGNOSIS — Z79.4 TYPE 2 DIABETES MELLITUS WITH HYPERGLYCEMIA, WITH LONG-TERM CURRENT USE OF INSULIN (HCC): ICD-10-CM

## 2019-06-27 DIAGNOSIS — D69.6 THROMBOCYTOPENIA (HCC): ICD-10-CM

## 2019-06-27 DIAGNOSIS — Z79.4 TYPE 2 DIABETES MELLITUS WITHOUT COMPLICATION, WITH LONG-TERM CURRENT USE OF INSULIN (HCC): ICD-10-CM

## 2019-06-27 LAB — SL AMB POCT HEMOGLOBIN AIC: 11 (ref ?–6.5)

## 2019-06-27 PROCEDURE — 3046F HEMOGLOBIN A1C LEVEL >9.0%: CPT | Performed by: PHYSICIAN ASSISTANT

## 2019-06-27 PROCEDURE — 99214 OFFICE O/P EST MOD 30 MIN: CPT | Performed by: PHYSICIAN ASSISTANT

## 2019-06-27 PROCEDURE — 3008F BODY MASS INDEX DOCD: CPT | Performed by: PHYSICIAN ASSISTANT

## 2019-06-27 PROCEDURE — 83036 HEMOGLOBIN GLYCOSYLATED A1C: CPT | Performed by: PHYSICIAN ASSISTANT

## 2019-06-27 PROCEDURE — 1036F TOBACCO NON-USER: CPT | Performed by: PHYSICIAN ASSISTANT

## 2019-06-27 RX ORDER — LISINOPRIL AND HYDROCHLOROTHIAZIDE 20; 12.5 MG/1; MG/1
1 TABLET ORAL DAILY
Qty: 30 TABLET | Refills: 3 | Status: SHIPPED | OUTPATIENT
Start: 2019-06-27 | End: 2020-08-14 | Stop reason: SDUPTHER

## 2019-06-27 RX ORDER — FOLIC ACID 1 MG/1
1 TABLET ORAL DAILY
Qty: 30 TABLET | Refills: 1 | Status: SHIPPED | OUTPATIENT
Start: 2019-06-27 | End: 2022-07-30

## 2019-06-27 RX ORDER — LANOLIN ALCOHOL/MO/W.PET/CERES
100 CREAM (GRAM) TOPICAL DAILY
Qty: 30 TABLET | Refills: 1 | Status: SHIPPED | OUTPATIENT
Start: 2019-06-27 | End: 2022-07-30

## 2019-06-27 RX ORDER — LANCETS
EACH MISCELLANEOUS
Qty: 100 EACH | Refills: 5 | Status: SHIPPED | OUTPATIENT
Start: 2019-06-27 | End: 2022-01-30

## 2019-06-27 RX ORDER — PANTOPRAZOLE SODIUM 40 MG/1
40 TABLET, DELAYED RELEASE ORAL
Qty: 30 TABLET | Refills: 1 | Status: SHIPPED | OUTPATIENT
Start: 2019-06-27 | End: 2022-07-30

## 2019-06-27 RX ORDER — BLOOD-GLUCOSE METER
EACH MISCELLANEOUS 3 TIMES DAILY
Qty: 1 KIT | Refills: 0 | Status: SHIPPED | OUTPATIENT
Start: 2019-06-27

## 2019-07-11 ENCOUNTER — TELEPHONE (OUTPATIENT)
Dept: NEPHROLOGY | Facility: HOSPITAL | Age: 49
End: 2019-07-11

## 2019-07-11 NOTE — TELEPHONE ENCOUNTER
Attempted to contact patient to schedule follow up appointment with Dr Juan Francisco Aguiar   Was unable to reach patient/leave message

## 2019-07-17 NOTE — TELEPHONE ENCOUNTER
2nd attempt to contact patient to schedule follow up appointment with Dr Andrey Noble   Was unable to reach patient/leave message

## 2019-10-02 DIAGNOSIS — Z79.4 TYPE 2 DIABETES MELLITUS WITH HYPERGLYCEMIA, WITH LONG-TERM CURRENT USE OF INSULIN (HCC): Primary | ICD-10-CM

## 2019-10-02 DIAGNOSIS — E11.65 TYPE 2 DIABETES MELLITUS WITH HYPERGLYCEMIA, WITH LONG-TERM CURRENT USE OF INSULIN (HCC): Primary | ICD-10-CM

## 2019-10-02 RX ORDER — INSULIN GLARGINE 100 [IU]/ML
25 INJECTION, SOLUTION SUBCUTANEOUS DAILY
Qty: 5 PEN | Refills: 3 | Status: SHIPPED | OUTPATIENT
Start: 2019-10-02 | End: 2020-08-14 | Stop reason: SDUPTHER

## 2019-10-02 RX ORDER — INSULIN GLARGINE 100 [IU]/ML
INJECTION, SOLUTION SUBCUTANEOUS
COMMUNITY
Start: 2019-06-27 | End: 2019-10-02 | Stop reason: SDUPTHER

## 2020-03-22 ENCOUNTER — HOSPITAL ENCOUNTER (OUTPATIENT)
Facility: HOSPITAL | Age: 50
Setting detail: OBSERVATION
Discharge: HOME | End: 2020-03-22
Attending: EMERGENCY MEDICINE | Admitting: INTERNAL MEDICINE
Payer: COMMERCIAL

## 2020-03-22 ENCOUNTER — APPOINTMENT (EMERGENCY)
Dept: CT IMAGING | Facility: HOSPITAL | Age: 50
End: 2020-03-22
Payer: COMMERCIAL

## 2020-03-22 VITALS
WEIGHT: 137.13 LBS | DIASTOLIC BLOOD PRESSURE: 76 MMHG | RESPIRATION RATE: 18 BRPM | SYSTOLIC BLOOD PRESSURE: 159 MMHG | BODY MASS INDEX: 22.82 KG/M2 | OXYGEN SATURATION: 98 % | TEMPERATURE: 97.8 F | HEART RATE: 92 BPM

## 2020-03-22 DIAGNOSIS — E16.2 HYPOGLYCEMIA: Primary | ICD-10-CM

## 2020-03-22 DIAGNOSIS — F10.920 ACUTE ALCOHOLIC INTOXICATION WITHOUT COMPLICATION (HCC): ICD-10-CM

## 2020-03-22 DIAGNOSIS — F10.229 ALCOHOL INTOXICATION IN ACTIVE ALCOHOLIC WITH COMPLICATION (HCC): ICD-10-CM

## 2020-03-22 DIAGNOSIS — E87.6 HYPOKALEMIA: ICD-10-CM

## 2020-03-22 LAB
ALBUMIN SERPL BCP-MCNC: 3.7 G/DL (ref 3.5–5)
ALP SERPL-CCNC: 61 U/L (ref 46–116)
ALT SERPL W P-5'-P-CCNC: 38 U/L (ref 12–78)
ANION GAP SERPL CALCULATED.3IONS-SCNC: 13 MMOL/L (ref 4–13)
ANION GAP SERPL CALCULATED.3IONS-SCNC: 9 MMOL/L (ref 4–13)
AST SERPL W P-5'-P-CCNC: 18 U/L (ref 5–45)
ATRIAL RATE: 85 BPM
BASOPHILS # BLD AUTO: 0.02 THOUSANDS/ΜL (ref 0–0.1)
BASOPHILS # BLD AUTO: 0.02 THOUSANDS/ΜL (ref 0–0.1)
BASOPHILS NFR BLD AUTO: 0 % (ref 0–1)
BASOPHILS NFR BLD AUTO: 0 % (ref 0–1)
BILIRUB SERPL-MCNC: 0.25 MG/DL (ref 0.2–1)
BUN SERPL-MCNC: 12 MG/DL (ref 5–25)
BUN SERPL-MCNC: 13 MG/DL (ref 5–25)
CALCIUM SERPL-MCNC: 8.6 MG/DL (ref 8.3–10.1)
CALCIUM SERPL-MCNC: 8.7 MG/DL (ref 8.3–10.1)
CHLORIDE SERPL-SCNC: 105 MMOL/L (ref 100–108)
CHLORIDE SERPL-SCNC: 107 MMOL/L (ref 100–108)
CO2 SERPL-SCNC: 24 MMOL/L (ref 21–32)
CO2 SERPL-SCNC: 28 MMOL/L (ref 21–32)
CREAT SERPL-MCNC: 0.72 MG/DL (ref 0.6–1.3)
CREAT SERPL-MCNC: 0.97 MG/DL (ref 0.6–1.3)
EOSINOPHIL # BLD AUTO: 0.03 THOUSAND/ΜL (ref 0–0.61)
EOSINOPHIL # BLD AUTO: 0.03 THOUSAND/ΜL (ref 0–0.61)
EOSINOPHIL NFR BLD AUTO: 0 % (ref 0–6)
EOSINOPHIL NFR BLD AUTO: 0 % (ref 0–6)
ERYTHROCYTE [DISTWIDTH] IN BLOOD BY AUTOMATED COUNT: 11.8 % (ref 11.6–15.1)
ERYTHROCYTE [DISTWIDTH] IN BLOOD BY AUTOMATED COUNT: 11.8 % (ref 11.6–15.1)
ETHANOL SERPL-MCNC: 353 MG/DL (ref 0–3)
GFR SERPL CREATININE-BSD FRML MDRD: 109 ML/MIN/1.73SQ M
GFR SERPL CREATININE-BSD FRML MDRD: 91 ML/MIN/1.73SQ M
GLUCOSE SERPL-MCNC: 165 MG/DL (ref 65–140)
GLUCOSE SERPL-MCNC: 170 MG/DL (ref 65–140)
GLUCOSE SERPL-MCNC: 247 MG/DL (ref 65–140)
GLUCOSE SERPL-MCNC: 66 MG/DL (ref 65–140)
GLUCOSE SERPL-MCNC: 92 MG/DL (ref 65–140)
GLUCOSE SERPL-MCNC: 94 MG/DL (ref 65–140)
HCT VFR BLD AUTO: 40.9 % (ref 36.5–49.3)
HCT VFR BLD AUTO: 44.2 % (ref 36.5–49.3)
HGB BLD-MCNC: 14 G/DL (ref 12–17)
HGB BLD-MCNC: 15.1 G/DL (ref 12–17)
IMM GRANULOCYTES # BLD AUTO: 0.06 THOUSAND/UL (ref 0–0.2)
IMM GRANULOCYTES # BLD AUTO: 0.06 THOUSAND/UL (ref 0–0.2)
IMM GRANULOCYTES NFR BLD AUTO: 1 % (ref 0–2)
IMM GRANULOCYTES NFR BLD AUTO: 1 % (ref 0–2)
LYMPHOCYTES # BLD AUTO: 1.29 THOUSANDS/ΜL (ref 0.6–4.47)
LYMPHOCYTES # BLD AUTO: 2.49 THOUSANDS/ΜL (ref 0.6–4.47)
LYMPHOCYTES NFR BLD AUTO: 13 % (ref 14–44)
LYMPHOCYTES NFR BLD AUTO: 22 % (ref 14–44)
MAGNESIUM SERPL-MCNC: 2 MG/DL (ref 1.6–2.6)
MCH RBC QN AUTO: 31.9 PG (ref 26.8–34.3)
MCH RBC QN AUTO: 32 PG (ref 26.8–34.3)
MCHC RBC AUTO-ENTMCNC: 34.2 G/DL (ref 31.4–37.4)
MCHC RBC AUTO-ENTMCNC: 34.2 G/DL (ref 31.4–37.4)
MCV RBC AUTO: 93 FL (ref 82–98)
MCV RBC AUTO: 94 FL (ref 82–98)
MONOCYTES # BLD AUTO: 0.38 THOUSAND/ΜL (ref 0.17–1.22)
MONOCYTES # BLD AUTO: 0.6 THOUSAND/ΜL (ref 0.17–1.22)
MONOCYTES NFR BLD AUTO: 4 % (ref 4–12)
MONOCYTES NFR BLD AUTO: 5 % (ref 4–12)
NEUTROPHILS # BLD AUTO: 8.19 THOUSANDS/ΜL (ref 1.85–7.62)
NEUTROPHILS # BLD AUTO: 8.54 THOUSANDS/ΜL (ref 1.85–7.62)
NEUTS SEG NFR BLD AUTO: 72 % (ref 43–75)
NEUTS SEG NFR BLD AUTO: 82 % (ref 43–75)
NRBC BLD AUTO-RTO: 0 /100 WBCS
NRBC BLD AUTO-RTO: 0 /100 WBCS
P AXIS: 36 DEGREES
PLATELET # BLD AUTO: 251 THOUSANDS/UL (ref 149–390)
PLATELET # BLD AUTO: 270 THOUSANDS/UL (ref 149–390)
PMV BLD AUTO: 10.2 FL (ref 8.9–12.7)
PMV BLD AUTO: 10.3 FL (ref 8.9–12.7)
POTASSIUM SERPL-SCNC: 3.2 MMOL/L (ref 3.5–5.3)
POTASSIUM SERPL-SCNC: 3.4 MMOL/L (ref 3.5–5.3)
PR INTERVAL: 136 MS
PROT SERPL-MCNC: 7.6 G/DL (ref 6.4–8.2)
QRS AXIS: 61 DEGREES
QRSD INTERVAL: 92 MS
QT INTERVAL: 406 MS
QTC INTERVAL: 459 MS
RBC # BLD AUTO: 4.37 MILLION/UL (ref 3.88–5.62)
RBC # BLD AUTO: 4.73 MILLION/UL (ref 3.88–5.62)
SODIUM SERPL-SCNC: 142 MMOL/L (ref 136–145)
SODIUM SERPL-SCNC: 144 MMOL/L (ref 136–145)
T WAVE AXIS: -1 DEGREES
VENTRICULAR RATE: 77 BPM
WBC # BLD AUTO: 10.32 THOUSAND/UL (ref 4.31–10.16)
WBC # BLD AUTO: 11.39 THOUSAND/UL (ref 4.31–10.16)

## 2020-03-22 PROCEDURE — 99285 EMERGENCY DEPT VISIT HI MDM: CPT | Performed by: EMERGENCY MEDICINE

## 2020-03-22 PROCEDURE — 80320 DRUG SCREEN QUANTALCOHOLS: CPT | Performed by: EMERGENCY MEDICINE

## 2020-03-22 PROCEDURE — 99285 EMERGENCY DEPT VISIT HI MDM: CPT

## 2020-03-22 PROCEDURE — 93010 ELECTROCARDIOGRAM REPORT: CPT | Performed by: INTERNAL MEDICINE

## 2020-03-22 PROCEDURE — 83735 ASSAY OF MAGNESIUM: CPT | Performed by: NURSE PRACTITIONER

## 2020-03-22 PROCEDURE — 93005 ELECTROCARDIOGRAM TRACING: CPT

## 2020-03-22 PROCEDURE — 82948 REAGENT STRIP/BLOOD GLUCOSE: CPT

## 2020-03-22 PROCEDURE — 85025 COMPLETE CBC W/AUTO DIFF WBC: CPT | Performed by: NURSE PRACTITIONER

## 2020-03-22 PROCEDURE — 36415 COLL VENOUS BLD VENIPUNCTURE: CPT | Performed by: EMERGENCY MEDICINE

## 2020-03-22 PROCEDURE — 80053 COMPREHEN METABOLIC PANEL: CPT | Performed by: EMERGENCY MEDICINE

## 2020-03-22 PROCEDURE — 70450 CT HEAD/BRAIN W/O DYE: CPT

## 2020-03-22 PROCEDURE — 80048 BASIC METABOLIC PNL TOTAL CA: CPT | Performed by: NURSE PRACTITIONER

## 2020-03-22 PROCEDURE — 99236 HOSP IP/OBS SAME DATE HI 85: CPT | Performed by: FAMILY MEDICINE

## 2020-03-22 PROCEDURE — 96360 HYDRATION IV INFUSION INIT: CPT

## 2020-03-22 PROCEDURE — 85025 COMPLETE CBC W/AUTO DIFF WBC: CPT | Performed by: EMERGENCY MEDICINE

## 2020-03-22 RX ORDER — ONDANSETRON 2 MG/ML
4 INJECTION INTRAMUSCULAR; INTRAVENOUS ONCE AS NEEDED
Status: DISCONTINUED | OUTPATIENT
Start: 2020-03-22 | End: 2020-03-22

## 2020-03-22 RX ORDER — MAGNESIUM HYDROXIDE/ALUMINUM HYDROXICE/SIMETHICONE 120; 1200; 1200 MG/30ML; MG/30ML; MG/30ML
30 SUSPENSION ORAL EVERY 6 HOURS PRN
Status: DISCONTINUED | OUTPATIENT
Start: 2020-03-22 | End: 2020-03-22 | Stop reason: HOSPADM

## 2020-03-22 RX ORDER — ACETAMINOPHEN 325 MG/1
650 TABLET ORAL EVERY 6 HOURS PRN
Status: DISCONTINUED | OUTPATIENT
Start: 2020-03-22 | End: 2020-03-22 | Stop reason: HOSPADM

## 2020-03-22 RX ORDER — DEXTROSE AND SODIUM CHLORIDE 5; .9 G/100ML; G/100ML
100 INJECTION, SOLUTION INTRAVENOUS CONTINUOUS
Status: DISCONTINUED | OUTPATIENT
Start: 2020-03-22 | End: 2020-03-22 | Stop reason: HOSPADM

## 2020-03-22 RX ORDER — CHLORDIAZEPOXIDE HYDROCHLORIDE 25 MG/1
25 CAPSULE, GELATIN COATED ORAL EVERY 6 HOURS SCHEDULED
Qty: 10 CAPSULE | Refills: 0 | Status: SHIPPED | OUTPATIENT
Start: 2020-03-22 | End: 2020-03-22 | Stop reason: HOSPADM

## 2020-03-22 RX ORDER — INSULIN GLARGINE 100 [IU]/ML
11 INJECTION, SOLUTION SUBCUTANEOUS ONCE
Status: COMPLETED | OUTPATIENT
Start: 2020-03-22 | End: 2020-03-22

## 2020-03-22 RX ORDER — DEXTROSE AND SODIUM CHLORIDE 5; .9 G/100ML; G/100ML
125 INJECTION, SOLUTION INTRAVENOUS CONTINUOUS
Status: DISCONTINUED | OUTPATIENT
Start: 2020-03-22 | End: 2020-03-22

## 2020-03-22 RX ORDER — PANTOPRAZOLE SODIUM 40 MG/1
40 TABLET, DELAYED RELEASE ORAL
Status: DISCONTINUED | OUTPATIENT
Start: 2020-03-22 | End: 2020-03-22 | Stop reason: HOSPADM

## 2020-03-22 RX ORDER — FOLIC ACID 1 MG/1
1 TABLET ORAL DAILY
Status: DISCONTINUED | OUTPATIENT
Start: 2020-03-22 | End: 2020-03-22 | Stop reason: HOSPADM

## 2020-03-22 RX ORDER — DEXTROSE MONOHYDRATE 100 MG/ML
250 INJECTION, SOLUTION INTRAVENOUS CONTINUOUS
Status: DISCONTINUED | OUTPATIENT
Start: 2020-03-22 | End: 2020-03-22

## 2020-03-22 RX ORDER — POTASSIUM CHLORIDE 20 MEQ/1
40 TABLET, EXTENDED RELEASE ORAL ONCE
Status: COMPLETED | OUTPATIENT
Start: 2020-03-22 | End: 2020-03-22

## 2020-03-22 RX ORDER — ONDANSETRON 2 MG/ML
4 INJECTION INTRAMUSCULAR; INTRAVENOUS EVERY 6 HOURS PRN
Status: DISCONTINUED | OUTPATIENT
Start: 2020-03-22 | End: 2020-03-22 | Stop reason: HOSPADM

## 2020-03-22 RX ORDER — THIAMINE MONONITRATE (VIT B1) 100 MG
100 TABLET ORAL DAILY
Status: DISCONTINUED | OUTPATIENT
Start: 2020-03-22 | End: 2020-03-22 | Stop reason: HOSPADM

## 2020-03-22 RX ORDER — CHLORDIAZEPOXIDE HYDROCHLORIDE 25 MG/1
25 CAPSULE, GELATIN COATED ORAL EVERY 6 HOURS SCHEDULED
Status: DISCONTINUED | OUTPATIENT
Start: 2020-03-22 | End: 2020-03-22 | Stop reason: HOSPADM

## 2020-03-22 RX ADMIN — Medication 1 TABLET: at 09:23

## 2020-03-22 RX ADMIN — INSULIN GLARGINE 11 UNITS: 100 INJECTION, SOLUTION SUBCUTANEOUS at 12:05

## 2020-03-22 RX ADMIN — LISINOPRIL: 20 TABLET ORAL at 09:23

## 2020-03-22 RX ADMIN — CHLORDIAZEPOXIDE HYDROCHLORIDE 25 MG: 25 CAPSULE ORAL at 09:23

## 2020-03-22 RX ADMIN — FOLIC ACID 1 MG: 1 TABLET ORAL at 09:23

## 2020-03-22 RX ADMIN — INSULIN LISPRO 2 UNITS: 100 INJECTION, SOLUTION INTRAVENOUS; SUBCUTANEOUS at 12:05

## 2020-03-22 RX ADMIN — DEXTROSE AND SODIUM CHLORIDE 125 ML/HR: 5; .9 INJECTION, SOLUTION INTRAVENOUS at 03:41

## 2020-03-22 RX ADMIN — DEXTROSE AND SODIUM CHLORIDE 100 ML/HR: 5; .9 INJECTION, SOLUTION INTRAVENOUS at 05:39

## 2020-03-22 RX ADMIN — PANTOPRAZOLE SODIUM 40 MG: 40 TABLET, DELAYED RELEASE ORAL at 05:41

## 2020-03-22 RX ADMIN — POTASSIUM CHLORIDE 40 MEQ: 1500 TABLET, EXTENDED RELEASE ORAL at 05:41

## 2020-03-22 RX ADMIN — THIAMINE HCL TAB 100 MG 100 MG: 100 TAB at 09:23

## 2020-03-22 NOTE — NURSING NOTE
Discharge instructions reviewed  Pt refusing to call for ride, pt states he is walking home  Escorted to main entracnce by staff  Discharged to home

## 2020-03-22 NOTE — ASSESSMENT & PLAN NOTE
Lab Results   Component Value Date    HGBA1C 11 (A) 06/27/2019   ·  Lantus which was held due to hypoglycemia on admission, will be resumed today with administration of 11 units of Lantus  · Current blood sugar 247 post breakfast   · Resume Lantus 25 units subcutaneously daily upon discharge to home      · ADA diet  Recent Labs     03/22/20  0148 03/22/20  0259 03/22/20  0746 03/22/20  1126   POCGLU 165* 94 92 247*       Blood Sugar Average: Last 72 hrs:  (P) 149 5

## 2020-03-22 NOTE — ASSESSMENT & PLAN NOTE
Resolved  Patient will need to resume Lantus which was held on admission due to hypoglycemia  Continue diabetic diet

## 2020-03-22 NOTE — DISCHARGE SUMMARY
Discharge- Bryant Lipscomb 1970, 48 y o  male MRN: [id number]    Unit/Bed#: E5 -01 Encounter: [id number]    Primary Care Provider: Elia Willis PA-C   Date and time admitted to hospital: 3/22/2020  1:43 AM        * Alcohol intoxication in active alcoholic with complication [facility])  Assessment & Plan  Patient admitted with acute alcohol intoxication  Ethanol level 353; admits to daily alcohol abuse; drank Rubi last night  Has been on CIWA protocol-and no signs or symptoms of delirium or withdrawal   Has responded well to IV hydration, thiamine, folate, multivitamins  Continue Librium 25 mg PO q 6 hours and performed rapid taper  Continue regular diabetic diet  Patient advised on alcohol cessation  Patient is currently hemodynamically stable for discharge to home today  Hypoglycemia  Assessment & Plan  Resolved  Patient will need to resume Lantus which was held on admission due to hypoglycemia  Continue diabetic diet  Hypokalemia  Assessment & Plan  Hypokalemia with K of 3 2 improved to 3 4 with repletion  Ordered potassium chloride 40 mEq PO x1  Encourage intake of potassium rich foods  Type 2 diabetes mellitus with hyperglycemia, with long-term current use of insulin [facility])  Assessment & Plan  Lab Results   Component Value Date    HGBA1C 11 (A) 06/27/2019   ·  Lantus which was held due to hypoglycemia on admission, will be resumed today with administration of 11 units of Lantus  · Current blood sugar 247 post breakfast   · Resume Lantus 25 units subcutaneously daily upon discharge to home      · ADA diet  Recent Labs     03/22/20  0148 03/22/20  0259 03/22/20  0746 03/22/20  1126   POCGLU 165* 94 92 247*       Blood Sugar Average: Last 72 hrs:  (P) 149 5    Essential hypertension  Assessment & Plan  Maintained on lisinopril HCTZ          Discharging Physician / Practitioner: Jayne Aragon MD  PCP: Anthony Cleary PA-C  Admission Date:   Admission Orders (From admission, onward) Ordered        03/22/20 0433  Place in Observation (expected length of stay for this patient is less than two midnights)  Once                   Discharge Date: 03/22/20    Resolved Problems  Date Reviewed: 3/22/2020    None          Consultations During Hospital Stay:  · None    Procedures Performed:   · CT head without contrast 03/22/2020, 12 lead EKG 03/22/2020  Significant Findings / Test Results:   · CT head without contrast 3/22-no acute intracranial abnormality  · Twelve lead EKG 3/22-normal sinus rhythm  Incidental Findings:   · None     Test Results Pending at Discharge (will require follow up): · None     Outpatient Tests Requested:  · None    Complications:  Hypoglycemia    Reason for Admission:  Acute alcohol intoxication    Hospital Course:     Pita Goncalves is a 48 y o  male patient who originally presented to the hospital on 3/22/2020 due to alcohol intoxication after he was out drinking Rubi last night and then he returned home and passed out according to his family  The patient's family called 46  Due to patient being acutely intoxicated, he was a poor historian and unable to provide much history  He was unable to accurately describe why his family called 911  En route to the ED via EMS, he was noted to have a blood sugar of 42  He was also comatose and when he woke up he was combative  He was given 100 mL of D10 IV which improved his blood glucose to 96 upon presentation to the ED  The patient was then able to recall that here was doing cocaine and drinking alcohol  Blood alcohol level was 353  EKG was within normal limits with normal sinus rhythm  CT head without contrast was negative  He was admitted to the medical-surgical unit and placed on the CIWA protocol with IV hydration, multi vitamins, folate, thiamine as well as Librium 25 mg PO q 6 hours  The patient's hypoglycemia improved with hydration and by holding his insulin    He was also administered a regular diet for breakfast and lunch  His blood sugars currently improved to 247  The patient is currently alert and awake and oriented to person place and time and has no signs or symptoms of delirium or alcohol withdrawal   He is currently hemodynamically stable for discharge to home today  Was advised to quit alcohol  Please see above list of diagnoses and related plan for additional information  Condition at Discharge: good     Discharge Day Visit / Exam:     Subjective:  Patient has no complaints  Vitals: Blood Pressure: 159/76 (03/22/20 0911)  Pulse: 92 (03/22/20 0911)  Temperature: 97 8 °F (36 6 °C) (03/22/20 0911)  Temp Source: Tympanic (03/22/20 0746)  Respirations: 18 (03/22/20 0911)  Weight - Scale: 62 2 kg (137 lb 2 oz) (03/22/20 0148)  SpO2: 98 % (03/22/20 0911)  Exam:   Physical Exam   Constitutional: He is oriented to person, place, and time  He appears well-developed and well-nourished  HENT:   Head: Normocephalic and atraumatic  Eyes: Pupils are equal, round, and reactive to light  EOM are normal  No scleral icterus  Neck: Normal range of motion  Neck supple  Cardiovascular: Normal rate, regular rhythm and normal heart sounds  No murmur heard  Pulmonary/Chest: Effort normal and breath sounds normal  He has no wheezes  He has no rales  Abdominal: Soft  Bowel sounds are normal    Musculoskeletal: He exhibits no edema  Neurological: He is alert and oriented to person, place, and time  Skin: Skin is warm and dry  He is not diaphoretic  Discussion with Family:  No    Discharge instructions/Information to patient and family:   See after visit summary for information provided to patient and family  Provisions for Follow-Up Care:  See after visit summary for information related to follow-up care and any pertinent home health orders        Disposition:     Home    For Discharges to [facility]:   · Not Applicable to this Patient - Not Applicable to this Patient    Planned Readmission:  None     Discharge Statement:  I spent 20 minutes discharging the patient  This time was spent on the day of discharge  I had direct contact with the patient on the day of discharge  Greater than 50% of the total time was spent examining patient, answering all patient questions, arranging and discussing plan of care with patient as well as directly providing post-discharge instructions  Additional time then spent on discharge activities  Discharge Medications:  See after visit summary for reconciled discharge medications provided to patient and family        ** Please Note: This note has been constructed using a voice recognition system **

## 2020-03-22 NOTE — PLAN OF CARE
Problem: Potential for Falls  Goal: Patient will remain free of falls  Description  INTERVENTIONS:  - Assess patient frequently for physical needs  -  Identify cognitive and physical deficits and behaviors that affect risk of falls  -  [place] fall precautions as indicated by assessment   - Educate patient/family on patient safety including physical limitations  - Instruct patient to call for assistance with activity based on assessment  - Modify environment to reduce risk of injury  - Consider OT/PT consult to assist with strengthening/mobility  Outcome: Progressing     Problem: Prexisting or High Potential for Compromised Skin Integrity  Goal: Skin integrity is maintained or improved  Description  INTERVENTIONS:  - Identify patients at risk for skin breakdown  - Assess and monitor skin integrity  - Assess and monitor nutrition and hydration status  - Monitor labs   - Assess for incontinence   - Turn and reposition patient  - Assist with mobility/ambulation  - Relieve pressure over bony prominences  - Avoid friction and shearing  - Provide appropriate hygiene as needed including keeping skin clean and dry  - Evaluate need for skin moisturizer/barrier cream  - Collaborate with interdisciplinary team   - Patient/family teaching  - Consider wound care consult   Outcome: Progressing     Problem: Nutrition/Hydration-ADULT  Goal: Nutrient/Hydration intake appropriate for improving, restoring or maintaining nutritional needs  Description  Monitor and assess patient's nutrition/hydration status for malnutrition  Collaborate with interdisciplinary team and initiate plan and interventions as ordered  Monitor patient's weight and dietary intake as ordered or per policy  Utilize nutrition screening tool and intervene as necessary  Determine patient's food preferences and provide high-protein, high-caloric foods as appropriate       INTERVENTIONS:  - Monitor oral intake, urinary output, labs, and treatment plans  - Assess nutrition and hydration status and recommend course of action  - Evaluate amount of meals eaten  - Assist patient with eating if necessary   - Allow adequate time for meals  - Recommend/ encourage appropriate diets, oral nutritional supplements, and vitamin/mineral supplements  - Order, calculate, and assess calorie counts as needed  - Recommend, monitor, and adjust tube feedings and TPN/PPN based on assessed needs  - Assess need for intravenous fluids  - Provide specific nutrition/hydration education as appropriate  - Include patient/family/caregiver in decisions related to nutrition  Outcome: Progressing

## 2020-03-22 NOTE — H&P
H&P- Saji Ryan 1970, 48 y o  male MRN: [id number]    Unit/Bed#: E5 -01 Encounter: [id number]    Primary Care Provider: Anthony Cleary PA-C   Date and time admitted to hospital: 3/22/2020  1:43 AM        * Alcohol intoxication in active alcoholic with complication ([address] )  Assessment & Plan  Ethanol level 353; admits to daily alcohol abuse; drank [employer]  Will place on CIWA protocol  IV hydration  Multivitamin, thiamine and folate  Add Librium 25 mg q6h  Monitor lytes    Hypoglycemia  Assessment & Plan  EMS reported blood glucose 42  Hold Lantus  D5 NS IV hydration  Close blood glucose monitoring    Hypokalemia  Assessment & Plan  K 3 2, replete, monitor serum K    Type 2 diabetes mellitus with hyperglycemia, with long-term current use of insulin (HCC)  Assessment & Plan  Lab Results   Component Value Date    HGBA1C 11 (A) 06/27/2019     · Maintained on Lantus, hold due to hypoglycemia, monitor blood glucose  · ADA diet  Recent Labs     03/22/20  0148 03/22/20  0259   POCGLU 165* 94       Blood Sugar Average: Last 72 hrs:  (P) 129 5    Essential hypertension  Assessment & Plan  Maintained on lisinopril HCTZ      VTE Prophylaxis: Low risk  / reason for no mechanical VTE prophylaxis Ambulate   Code Status:   POLST: POLST is not applicable to this patient  Discussion with family:     Anticipated Length of Stay:  Patient will be admitted on an Observation basis with an anticipated length of stay of  < 2 midnights  Justification for Hospital Stay:  Alcohol intoxication, hypoglycemia    Total Time for Visit, including Counseling / Coordination of Care: 45 minutes  Greater than 50% of this total time spent on direct patient counseling and coordination of care  Chief Complaint:   "Drank a lot tonight"    History of Present Illness:    Saji Ryan is a 48 y o  male with PMH HTN DM2 P-Afib who presents with c/o alcohol intoxication    Patient states he was out drinking tonight, came home and passed out, family called 46  Poor historian due to active intoxication  Patient admits to daily drinking, states he drank "a lot" of [employer]  Patient states he does not know why his family called 911, states he thinks his sugar was low, did not eat today due to alcohol consumption  Review of Systems:    Review of Systems   Constitutional: Negative  Respiratory: Negative  Cardiovascular: Negative  Gastrointestinal: Negative  Endocrine:        Hypoglycemia   Genitourinary: Negative  Musculoskeletal: Negative  Neurological: Negative  Past Medical and Surgical History:     Past Medical History:   Diagnosis Date    Alcohol abuse     Chronic pancreatitis ([facility] [address] )     Diabetes mellitus (HCC)     Type 2    Pancreatitis     Paroxysmal A-fib (HCC)     Thrombocytopenia (HCC)        Past Surgical History:   Procedure Laterality Date    IR BONE MARROW BIOPSY/ASPIRATION  2/7/2019       Meds/Allergies:    Prior to Admission medications    Medication Sig Start Date End Date Taking?  Authorizing Provider   Sulma Aguila 100 units/mL injection pen Inject 25 Units under the skin daily 10/2/19   Anthony Pica, PA-C   Blood Glucose Monitoring Suppl (ACCU-CHEK GUIDE) w/Device KIT by Does not apply route 3 (three) times a day 6/27/19   Anthony Pica, PA-C   folic acid (FOLVITE) 1 mg tablet Take 1 tablet (1 mg total) by mouth daily 6/27/19   Anthony Pica, PA-C   glucose blood (ACCU-CHEK GUIDE) test strip Check blood sugar 3 times a day 6/27/19   Anthony Chaveza, PA-C   Insulin Pen Needle (UNIFINE PENTIPS) 32G X 4 MM MISC Inject as directed daily 6/27/19   Anthony Chaveza, PA-C   Lancets (ACCU-CHEK MULTICLIX) lancets Check blood sugar 3 times a day 6/27/19   Anthony Pica, PA-C   lisinopril-hydrochlorothiazide (PRINZIDE,ZESTORETIC) 20-12 5 MG per tablet Take 1 tablet by mouth daily 6/27/19   Anthony Pica, PA-C   pantoprazole (PROTONIX) 40 mg tablet Take 1 tablet (40 mg total) by mouth daily in the early morning 6/27/19   Anthony Cleary PA-C   thiamine 100 MG tablet Take 1 tablet (100 mg total) by mouth daily 6/27/19   Anthony Cleary PA-C     I have reviewed home medications using allscripts  Allergies: No Known Allergies    Social History:     Marital Status: Single   Occupation:  Unemployed  Patient Pre-hospital Living Situation:  Resides at home with parents  Patient Pre-hospital Level of Mobility:  Ambulatory  Patient Pre-hospital Diet Restrictions:   Substance Use History:   Social History     Substance and Sexual Activity   Alcohol Use Yes    Frequency: 2-3 times a week    Drinks per session: 3 or 4    Binge frequency: Weekly     Social History     Tobacco Use   Smoking Status Former Smoker   Smokeless Tobacco Never Used     Social History     Substance and Sexual Activity   Drug Use Yes    Types: Cocaine       Family History:    Family History   Problem Relation Age of Onset    Diabetes Mother     Hypertension Mother     Hyperlipidemia Father        Physical Exam:     Vitals:   Blood Pressure: 164/83 (03/22/20 0512)  Pulse: 73 (03/22/20 0512)  Temperature: 97 5 °F (36 4 °C) (03/22/20 0512)  Temp Source: Temporal (03/22/20 0512)  Respirations: 18 (03/22/20 0512)  Weight - Scale: 62 2 kg (137 lb 2 oz) (03/22/20 0148)  SpO2: 99 % (03/22/20 0512)    Physical Exam   Constitutional: He is oriented to person, place, and time  He appears well-developed and well-nourished  No distress  Intoxicated   HENT:   Head: Normocephalic and atraumatic  Eyes: No scleral icterus  Neck: Neck supple  Cardiovascular: Normal rate, regular rhythm and normal heart sounds  No murmur heard  Pulmonary/Chest: Effort normal and breath sounds normal  No stridor  No respiratory distress  He has no wheezes  He has no rales  Abdominal: Soft  Bowel sounds are normal  He exhibits no distension and no mass  There is no tenderness  There is no guarding  Musculoskeletal: He exhibits no edema     Neurological: He is alert and oriented to person, place, and time  Skin: Skin is warm and dry  He is not diaphoretic  Additional Data:     Lab Results: I have personally reviewed pertinent reports  Results from last 7 days   Lab Units 03/22/20  0205   WBC Thousand/uL 10 32*   HEMOGLOBIN g/dL 14 0   HEMATOCRIT % 40 9   PLATELETS Thousands/uL 251   NEUTROS PCT % 82*   LYMPHS PCT % 13*   MONOS PCT % 4   EOS PCT % 0     Results from last 7 days   Lab Units 03/22/20  0205   SODIUM mmol/L 142   POTASSIUM mmol/L 3 2*   CHLORIDE mmol/L 105   CO2 mmol/L 28   BUN mg/dL 12   CREATININE mg/dL 0 97   ANION GAP mmol/L 9   CALCIUM mg/dL 8 6   ALBUMIN g/dL 3 7   TOTAL BILIRUBIN mg/dL 0 25   ALK PHOS U/L 61   ALT U/L 38   AST U/L 18   GLUCOSE RANDOM mg/dL 170*         Results from last 7 days   Lab Units 03/22/20  0259 03/22/20  0148   POC GLUCOSE mg/dl 94 165*               Imaging: I have personally reviewed pertinent reports  CT head without contrast   Final Result by Everett Garcia DO (03/22 0421)      No acute intracranial abnormality  Workstation performed: [id number]             EKG, Pathology, and Other Studies Reviewed on Admission:   · EKG: CT    Allscripts / Epic Records Reviewed: Yes     ** Please Note: This note has been constructed using a voice recognition system   **

## 2020-03-22 NOTE — ED PROVIDER NOTES
History  Chief Complaint   Patient presents with    Alcohol Intoxication     Patient presents via EMS after drinking alcohol and doing cocaine  Per EMS patient's BGL 42   Patient was comatose and when woke was combative  Patient given 100mL D10   Hypoglycemia - Symptomatic     per EMS patient BGL 42 upon arrival to scene  Pt is a 48year old male with a PMH of type 2 DM with long term insulin use, paroxysmal atrial fibrillation, alcohol abuse presenting with hypoglycemia  EMS found the pt face down snoring  When woken he was slurring his words and had a BS of 42  He is heavily intoxicated as well  He was given 100 cc of D10 by EMS and had BS over 200  His BS in ED is 165  He continues to slur his words but is moving all extremities equally  Offers no complaints at this time  Prior to Admission Medications   Prescriptions Last Dose Informant Patient Reported? Taking?    BASAGLAR KWIKPEN 100 units/mL injection pen   No No   Sig: Inject 25 Units under the skin daily   Blood Glucose Monitoring Suppl (ACCU-CHEK GUIDE) w/Device KIT   No No   Sig: by Does not apply route 3 (three) times a day   Insulin Pen Needle (UNIFINE PENTIPS) 32G X 4 MM MISC   No No   Sig: Inject as directed daily   Lancets (ACCU-CHEK MULTICLIX) lancets   No No   Sig: Check blood sugar 3 times a day   folic acid (FOLVITE) 1 mg tablet   No No   Sig: Take 1 tablet (1 mg total) by mouth daily   glucose blood (ACCU-CHEK GUIDE) test strip   No No   Sig: Check blood sugar 3 times a day   lisinopril-hydrochlorothiazide (PRINZIDE,ZESTORETIC) 20-12 5 MG per tablet   No No   Sig: Take 1 tablet by mouth daily   pantoprazole (PROTONIX) 40 mg tablet   No No   Sig: Take 1 tablet (40 mg total) by mouth daily in the early morning   thiamine 100 MG tablet   No No   Sig: Take 1 tablet (100 mg total) by mouth daily      Facility-Administered Medications: None       Past Medical History:   Diagnosis Date    Alcohol abuse     Chronic pancreatitis (HCC)  Diabetes mellitus (HCC)     Type 2    Pancreatitis     Paroxysmal A-fib (HCC)     Thrombocytopenia (HCC)        Past Surgical History:   Procedure Laterality Date    IR BONE MARROW BIOPSY/ASPIRATION  2/7/2019       Family History   Problem Relation Age of Onset    Diabetes Mother     Hypertension Mother     Hyperlipidemia Father      I have reviewed and agree with the history as documented  E-Cigarette/Vaping     E-Cigarette/Vaping Substances     Social History     Tobacco Use    Smoking status: Former Smoker    Smokeless tobacco: Never Used   Substance Use Topics    Alcohol use: Yes     Frequency: 2-3 times a week     Drinks per session: 3 or 4     Binge frequency: Weekly    Drug use: Yes     Types: Cocaine       Review of Systems   Constitutional: Negative  HENT: Negative  Respiratory: Negative  Cardiovascular: Negative  Gastrointestinal: Negative  Genitourinary: Negative  Musculoskeletal: Negative  Neurological: Negative  All other systems reviewed and are negative  Physical Exam  Physical Exam   Constitutional: He is oriented to person, place, and time  He appears well-developed and well-nourished  No distress  Heavily intoxicated  HENT:   Head: Normocephalic and atraumatic  Right Ear: External ear normal    Left Ear: External ear normal    Nose: Nose normal    Mouth/Throat: Oropharynx is clear and moist  No oropharyngeal exudate  Eyes: Pupils are equal, round, and reactive to light  Conjunctivae and EOM are normal    Neck: Normal range of motion  Neck supple  Cardiovascular: Normal rate, regular rhythm, normal heart sounds and intact distal pulses  Pulmonary/Chest: Effort normal and breath sounds normal    Musculoskeletal: Normal range of motion  Neurological: He is alert and oriented to person, place, and time  He has normal strength  No cranial nerve deficit or sensory deficit  He exhibits normal muscle tone   Coordination normal    Non-focal neuro exam     Skin: Skin is warm and dry  He is not diaphoretic         Vital Signs  ED Triage Vitals   Temperature Pulse Respirations Blood Pressure SpO2   03/22/20 0148 03/22/20 0145 03/22/20 0148 03/22/20 0148 03/22/20 0145   98 °F (36 7 °C) 80 22 (!) 181/91 100 %      Temp Source Heart Rate Source Patient Position - Orthostatic VS BP Location FiO2 (%)   03/22/20 0148 03/22/20 0148 03/22/20 0148 03/22/20 0148 --   Oral Monitor Lying Right arm       Pain Score       03/22/20 0317       No Pain           Vitals:    03/22/20 0145 03/22/20 0148 03/22/20 0317   BP:  (!) 181/91 121/67   Pulse: 80 76 70   Patient Position - Orthostatic VS:  Lying Lying         Visual Acuity      ED Medications  Medications   dextrose infusion 10 % (0 mL Intravenous Given to EMS 3/22/20 0202)   dextrose 5 % and sodium chloride 0 9 % infusion (125 mL/hr Intravenous New Bag 3/22/20 0341)   ondansetron (ZOFRAN) injection 4 mg (has no administration in time range)       Diagnostic Studies  Results Reviewed     Procedure Component Value Units Date/Time    Fingerstick Glucose (POCT) [[id number]]  (Normal) Collected:  03/22/20 0259    Lab Status:  Final result Updated:  03/22/20 0301     POC Glucose 94 mg/dl     Ethanol [[id number]]  (Abnormal) Collected:  03/22/20 0205    Lab Status:  Final result Specimen:  Blood from Arm, Left Updated:  03/22/20 0244     Ethanol Lvl 353 mg/dL     Comprehensive metabolic panel [[id number]]  (Abnormal) Collected:  03/22/20 0205    Lab Status:  Final result Specimen:  Blood from Arm, Left Updated:  03/22/20 0227     Sodium 142 mmol/L      Potassium 3 2 mmol/L      Chloride 105 mmol/L      CO2 28 mmol/L      ANION GAP 9 mmol/L      BUN 12 mg/dL      Creatinine 0 97 mg/dL      Glucose 170 mg/dL      Calcium 8 6 mg/dL      AST 18 U/L      ALT 38 U/L      Alkaline Phosphatase 61 U/L      Total Protein 7 6 g/dL      Albumin 3 7 g/dL      Total Bilirubin 0 25 mg/dL      eGFR 91 ml/min/1 73sq m     Narrative:       Consolidated [employer] guidelines for Chronic Kidney Disease (CKD):     Stage 1 with normal or high GFR (GFR > 90 mL/min/1 73 square meters)    Stage 2 Mild CKD (GFR = 60-89 mL/min/1 73 square meters)    Stage 3A Moderate CKD (GFR = 45-59 mL/min/1 73 square meters)    Stage 3B Moderate CKD (GFR = 30-44 mL/min/1 73 square meters)    Stage 4 Severe CKD (GFR = 15-29 mL/min/1 73 square meters)    Stage 5 End Stage CKD (GFR <15 mL/min/1 73 square meters)  Note: GFR calculation is accurate only with a steady state creatinine    CBC and differential [[id number]]  (Abnormal) Collected:  03/22/20 0205    Lab Status:  Final result Specimen:  Blood from Arm, Left Updated:  03/22/20 0212     WBC 10 32 Thousand/uL      RBC 4 37 Million/uL      Hemoglobin 14 0 g/dL      Hematocrit 40 9 %      MCV 94 fL      MCH 32 0 pg      MCHC 34 2 g/dL      RDW 11 8 %      MPV 10 2 fL      Platelets 547 Thousands/uL      nRBC 0 /100 WBCs      Neutrophils Relative 82 %      Immat GRANS % 1 %      Lymphocytes Relative 13 %      Monocytes Relative 4 %      Eosinophils Relative 0 %      Basophils Relative 0 %      Neutrophils Absolute 8 54 Thousands/µL      Immature Grans Absolute 0 06 Thousand/uL      Lymphocytes Absolute 1 29 Thousands/µL      Monocytes Absolute 0 38 Thousand/µL      Eosinophils Absolute 0 03 Thousand/µL      Basophils Absolute 0 02 Thousands/µL     Fingerstick Glucose (POCT) [[id number]]  (Abnormal) Collected:  03/22/20 0148    Lab Status:  Final result Updated:  03/22/20 0149     POC Glucose 165 mg/dl                  CT head without contrast   Final Result by Triston Hannah DO (03/22 0421)      No acute intracranial abnormality                    Workstation performed: [id number]                    Procedures  ECG 12 Lead Documentation Only  Date/Time: 3/22/2020 1:53 AM  Performed by: Jayce Moore PA-C  Authorized by: Jayce Moore PA-C     ECG reviewed by me, the ED Provider: yes    Patient location:  ED  Previous ECG: Previous ECG:  Compared to current    Comparison to cardiac monitor: No    Interpretation:     Interpretation: abnormal    Rate:     ECG rate:  77    ECG rate assessment: normal    Rhythm:     Rhythm: sinus rhythm    Ectopy:     Ectopy: none    QRS:     QRS axis:  Normal    QRS intervals:  Normal  Conduction:     Conduction: normal    ST segments:     ST segments:  Normal  T waves:     T waves: normal               ED Course  ED Course as of Mar 22 0434   Sun Mar 22, 2020   0433 CT of the head negative for acute findings  With glucose continuing to drop quickly after being hypoglycemic, and alcohol as high as it is, will admit to [facility] for observation  Placed on D5 NS                                      MDM      Disposition  Final diagnoses:   Hypoglycemia   Acute alcoholic intoxication without complication ([facility] [address] )   Hypokalemia     Time reflects when diagnosis was documented in both MDM as applicable and the Disposition within this note     Time User Action Codes Description Comment    3/22/2020  3:13 AM Blenda Marilee B Add [I95 89] Symptomatic hypotension     3/22/2020  3:13 AM Elizabeth Brannon Remove [I95 89] Symptomatic hypotension     3/22/2020  3:13 AM Blenda Marilee B Add [E16 2] Hypoglycemia     3/22/2020  3:13 AM Blenda Marilee B Add [V04 690] Acute alcoholic intoxication without complication ([facility] [address] )     6/70/4257  3:13 AM Blenda Marilee B Add [E87 6] Hypokalemia       ED Disposition     ED Disposition Condition Date/Time Comment    Admit Stable [place] Mar 22, 2020  4:30 AM Case was discussed with EUGENIO and the patient's admission status was agreed to be Admission Status: observation status to the service of Dr Doni Raymond   Follow-up Information    None         Patient's Medications   Discharge Prescriptions    No medications on file     No discharge procedures on file      PDMP Review     None          ED Provider  Electronically Signed by           Franco Arreola PA-C  03/22/20 8272

## 2020-03-22 NOTE — ASSESSMENT & PLAN NOTE
Ethanol level 353; admits to daily alcohol abuse; drank [employer]  Will place on [facility] protocol  IV hydration  Multivitamin, thiamine and folate  Add Librium 25 mg q6h  Monitor lytes

## 2020-03-22 NOTE — ASSESSMENT & PLAN NOTE
Patient admitted with acute alcohol intoxication  Ethanol level 353; admits to daily alcohol abuse; drank Rubi last night  Has been on CIWA protocol-and no signs or symptoms of delirium or withdrawal   Has responded well to IV hydration, thiamine, folate, multivitamins  Continue Librium 25 mg PO q 6 hours and performed rapid taper  Continue regular diabetic diet  Patient advised on alcohol cessation  Patient is currently hemodynamically stable for discharge to home today

## 2020-03-22 NOTE — ASSESSMENT & PLAN NOTE
Hypokalemia with K of 3 2 improved to 3 4 with repletion  Ordered potassium chloride 40 mEq PO x1  Encourage intake of potassium rich foods

## 2020-03-22 NOTE — PLAN OF CARE
Problem: Potential for Falls  Goal: Patient will remain free of falls  Description  INTERVENTIONS:  - Assess patient frequently for physical needs  -  Identify cognitive and physical deficits and behaviors that affect risk of falls  -  [place] fall precautions as indicated by assessment   - Educate patient/family on patient safety including physical limitations  - Instruct patient to call for assistance with activity based on assessment  - Modify environment to reduce risk of injury  - Consider OT/PT consult to assist with strengthening/mobility  Outcome: Progressing     Problem: Prexisting or High Potential for Compromised Skin Integrity  Goal: Skin integrity is maintained or improved  Description  INTERVENTIONS:  - Identify patients at risk for skin breakdown  - Assess and monitor skin integrity  - Assess and monitor nutrition and hydration status  - Monitor labs   - Assess for incontinence   - Turn and reposition patient  - Assist with mobility/ambulation  - Relieve pressure over bony prominences  - Avoid friction and shearing  - Provide appropriate hygiene as needed including keeping skin clean and dry  - Evaluate need for skin moisturizer/barrier cream  - Collaborate with interdisciplinary team   - Patient/family teaching  - Consider wound care consult   Outcome: Progressing     Problem: Nutrition/Hydration-ADULT  Goal: Nutrient/Hydration intake appropriate for improving, restoring or maintaining nutritional needs  Description  Monitor and assess patient's nutrition/hydration status for malnutrition  Collaborate with interdisciplinary team and initiate plan and interventions as ordered  Monitor patient's weight and dietary intake as ordered or per policy  Utilize nutrition screening tool and intervene as necessary  Determine patient's food preferences and provide high-protein, high-caloric foods as appropriate       INTERVENTIONS:  - Monitor oral intake, urinary output, labs, and treatment plans  - Assess nutrition and hydration status and recommend course of action  - Evaluate amount of meals eaten  - Assist patient with eating if necessary   - Allow adequate time for meals  - Recommend/ encourage appropriate diets, oral nutritional supplements, and vitamin/mineral supplements  - Order, calculate, and assess calorie counts as needed  - Recommend, monitor, and adjust tube feedings and TPN/PPN based on assessed needs  - Assess need for intravenous fluids  - Provide specific nutrition/hydration education as appropriate  - Include patient/family/caregiver in decisions related to nutrition  Outcome: Progressing

## 2020-03-22 NOTE — UTILIZATION REVIEW
Initial Clinical Review    Admission: Date/Time/Statement: Admission Orders (From admission, onward)     Ordered        03/22/20 0433  Place in Observation (expected length of stay for this patient is less than two midnights)  Once                   Orders Placed This Encounter   Procedures    Place in Observation (expected length of stay for this patient is less than two midnights)     Standing Status:   Standing     Number of Occurrences:   1     Order Specific Question:   Admitting Physician     Answer:   Antonio Wallace [[id number]]     Order Specific Question:   Level of Care     Answer:   Med Surg [[id number]]     ED Arrival Information     Expected Arrival Acuity Means of Arrival Escorted By Service Admission Type    - 3/22/2020 01:43 Emergent Ambulance [facility] ([address]) Hospitalist Emergency    Arrival Complaint    hypoglycemia        Chief Complaint   Patient presents with    Alcohol Intoxication     Patient presents via EMS after drinking alcohol and doing cocaine  Per EMS patient's BGL 42   Patient was comatose and when woke was combative  Patient given 100mL D10   Hypoglycemia - Symptomatic     per EMS patient BGL 42 upon arrival to scene  Assessment/Plan 48year old male with a PMH of type 2 DM with long term insulin use, paroxysmal atrial fibrillation, alcohol abuse presenting with hypoglycemia  EMS found the pt face down snoring  When woken he was slurring his words and had a BS of 42  He is heavily intoxicated as well  He was given 100 cc of D10 by EMS and had BS over 200  His BS in ED is 165  He continues to slur his words but is moving all extremities equally        ED Triage Vitals   Temperature Pulse Respirations Blood Pressure SpO2   03/22/20 0148 03/22/20 0145 03/22/20 0148 03/22/20 0148 03/22/20 0145   98 °F (36 7 °C) 80 22 (!) 181/91 100 %      Temp Source Heart Rate Source Patient Position - Orthostatic VS BP Location FiO2 (%)   03/22/20 0148 03/22/20 0148 03/22/20 0148 03/22/20 0148 --   Oral Monitor Lying Right arm       Pain Score       03/22/20 0317       No Pain        Wt Readings from Last 1 Encounters:   03/22/20 62 2 kg (137 lb 2 oz)     Additional Vital Signs:   Pertinent Labs/Diagnostic Test Results:   Results from last 7 days   Lab Units 03/22/20  0537 03/22/20  0205   WBC Thousand/uL 11 39* 10 32*   HEMOGLOBIN g/dL 15 1 14 0   HEMATOCRIT % 44 2 40 9   PLATELETS Thousands/uL 270 251   NEUTROS ABS Thousands/µL 8 19* 8 54*         Results from last 7 days   Lab Units 03/22/20  0537 03/22/20  0205   SODIUM mmol/L 144 142   POTASSIUM mmol/L 3 4* 3 2*   CHLORIDE mmol/L 107 105   CO2 mmol/L 24 28   ANION GAP mmol/L 13 9   BUN mg/dL 13 12   CREATININE mg/dL 0 72 0 97   EGFR ml/min/1 73sq m 109 91   CALCIUM mg/dL 8 7 8 6   MAGNESIUM mg/dL 2 0  --      Results from last 7 days   Lab Units 03/22/20  0205   AST U/L 18   ALT U/L 38   ALK PHOS U/L 61   TOTAL PROTEIN g/dL 7 6   ALBUMIN g/dL 3 7   TOTAL BILIRUBIN mg/dL 0 25     Results from last 7 days   Lab Units 03/22/20  0746 03/22/20  0259 03/22/20  0148   POC GLUCOSE mg/dl 92 94 165*     Results from last 7 days   Lab Units 03/22/20  0537 03/22/20  0205   GLUCOSE RANDOM mg/dL 66 170*             BETA-HYDROXYBUTYRATE   Date Value Ref Range Status   02/02/2019 0 16 0 02 - 0 27 mmol/L Final     Results from last 7 days   Lab Units 03/22/20  0205   ETHANOL LVL mg/dL 353*   EKG 03-22-20    ECG rate:  77    ECG rate assessment: normal    Rhythm:     Rhythm: sinus rhythm    Ectopy:     Ectopy: none    QRS:     QRS axis:  Normal    QRS intervals:  Normal  Conduction:     Conduction: normal    ST segments:     ST segments:  Normal  T waves:     T waves: normal       CT head 03-22-20  No acute intracranial abnormality      ED Treatment:   Medication Administration from 03/22/2020 0143 to 03/22/2020 0500       Date/Time Order Dose Route Action     03/22/2020 0341 dextrose 5 % and sodium chloride 0 9 % infusion 125 mL/hr Intravenous New Bag Past Medical History:   Diagnosis Date    Alcohol abuse     Chronic pancreatitis ([place] [address] )     Diabetes mellitus (HCC)     Type 2    Pancreatitis     Paroxysmal A-fib (HCC)     Thrombocytopenia (HCC)      Present on Admission:   Hypoglycemia   Essential hypertension   Hypokalemia      Admitting Diagnosis: Hypokalemia [E87 6]  Alcohol intoxication ([place] [address] ) [F10 929]  Hypoglycemia [P22 9]  Acute alcoholic intoxication without complication ([place] [address] ) [P74 355]  Age/Sex: 48 y o  male  Admission Orders:  Scheduled Medications:    Medications:  chlordiazePOXIDE 25 mg Oral G1X [address]   folic acid 1 mg Oral Daily   insulin lispro 1-5 Units Subcutaneous 4x Daily (AC & HS)   lisinopril-hydrochlorothiazide (PRINZIDE 20/12  5) combo dose  Oral Daily   multivitamin-minerals 1 tablet Oral Daily   pantoprazole 40 mg Oral Early Morning   thiamine 100 mg Oral Daily     Continuous IV Infusions:    dextrose 5 % and sodium chloride 0 9 % 100 mL/hr Intravenous Continuous     PRN Meds:    acetaminophen 650 mg Oral Q6H PRN   aluminum-magnesium hydroxide-simethicone 30 mL Oral Q6H PRN   ondansetron 4 mg Intravenous Q6H PRN       Blood sugar ac and hs  Ciwa scores     Network Utilization Review Department  [email or web address]  ATTENTION: Please call with any questions or concerns to [phone] and carefully listen to the prompts so that you are directed to the right person  All voicemails are confidential   Mason Ramirez all requests for admission clinical reviews, approved or denied determinations and any other requests to dedicated fax number below belonging to the campus where the patient is receiving treatment   List of dedicated fax numbers for the Facilities:  FACILITY NAME UR FAX NUMBER   ADMISSION DENIALS (Administrative/Medical Necessity) [phone]   [address] (Maternity/NICU/Pediatrics) [phone]   Prem Nuñez [address] [phone]   Kyle Lu Reece Sanchez [phone]   Anders Buerger [facility] [address] [facility] [phone]   [facility]  [phone] [address] [facility]  [phone]   [address] [place] [address] [address] [facility] [phone]

## 2020-03-22 NOTE — ASSESSMENT & PLAN NOTE
Lab Results   Component Value Date    HGBA1C 11 (A) 06/27/2019     · Maintained on Lantus, hold due to hypoglycemia, monitor blood glucose  · ADA diet  Recent Labs     03/22/20  0148 03/22/20  0259   POCGLU 165* 94       Blood Sugar Average: Last 72 hrs:  (P) 129 5

## 2020-03-24 ENCOUNTER — TELEPHONE (OUTPATIENT)
Dept: FAMILY MEDICINE CLINIC | Facility: CLINIC | Age: 50
End: 2020-03-24

## 2020-08-14 ENCOUNTER — OFFICE VISIT (OUTPATIENT)
Dept: FAMILY MEDICINE CLINIC | Facility: CLINIC | Age: 50
End: 2020-08-14

## 2020-08-14 VITALS
DIASTOLIC BLOOD PRESSURE: 100 MMHG | SYSTOLIC BLOOD PRESSURE: 170 MMHG | TEMPERATURE: 96.9 F | OXYGEN SATURATION: 98 % | WEIGHT: 143 LBS | HEART RATE: 91 BPM | RESPIRATION RATE: 16 BRPM | HEIGHT: 65 IN | BODY MASS INDEX: 23.82 KG/M2

## 2020-08-14 DIAGNOSIS — I10 ESSENTIAL HYPERTENSION: ICD-10-CM

## 2020-08-14 DIAGNOSIS — F10.20 UNCOMPLICATED ALCOHOL DEPENDENCE (HCC): ICD-10-CM

## 2020-08-14 DIAGNOSIS — Z79.4 TYPE 2 DIABETES MELLITUS WITH HYPERGLYCEMIA, WITH LONG-TERM CURRENT USE OF INSULIN (HCC): Primary | ICD-10-CM

## 2020-08-14 DIAGNOSIS — H91.93 BILATERAL HEARING LOSS, UNSPECIFIED HEARING LOSS TYPE: ICD-10-CM

## 2020-08-14 DIAGNOSIS — E11.65 TYPE 2 DIABETES MELLITUS WITH HYPERGLYCEMIA, WITH LONG-TERM CURRENT USE OF INSULIN (HCC): Primary | ICD-10-CM

## 2020-08-14 LAB — SL AMB POCT HEMOGLOBIN AIC: 9.3 (ref ?–6.5)

## 2020-08-14 PROCEDURE — 3077F SYST BP >= 140 MM HG: CPT | Performed by: PHYSICIAN ASSISTANT

## 2020-08-14 PROCEDURE — 83036 HEMOGLOBIN GLYCOSYLATED A1C: CPT | Performed by: PHYSICIAN ASSISTANT

## 2020-08-14 PROCEDURE — 1036F TOBACCO NON-USER: CPT | Performed by: PHYSICIAN ASSISTANT

## 2020-08-14 PROCEDURE — 3080F DIAST BP >= 90 MM HG: CPT | Performed by: PHYSICIAN ASSISTANT

## 2020-08-14 PROCEDURE — 3008F BODY MASS INDEX DOCD: CPT | Performed by: PHYSICIAN ASSISTANT

## 2020-08-14 PROCEDURE — 3725F SCREEN DEPRESSION PERFORMED: CPT | Performed by: PHYSICIAN ASSISTANT

## 2020-08-14 PROCEDURE — 4010F ACE/ARB THERAPY RXD/TAKEN: CPT | Performed by: PHYSICIAN ASSISTANT

## 2020-08-14 PROCEDURE — 3066F NEPHROPATHY DOC TX: CPT | Performed by: PHYSICIAN ASSISTANT

## 2020-08-14 PROCEDURE — 99214 OFFICE O/P EST MOD 30 MIN: CPT | Performed by: PHYSICIAN ASSISTANT

## 2020-08-14 PROCEDURE — 3046F HEMOGLOBIN A1C LEVEL >9.0%: CPT | Performed by: PHYSICIAN ASSISTANT

## 2020-08-14 RX ORDER — BLOOD SUGAR DIAGNOSTIC
STRIP MISCELLANEOUS
Qty: 100 EACH | Refills: 5 | Status: SHIPPED | OUTPATIENT
Start: 2020-08-14

## 2020-08-14 RX ORDER — INSULIN GLARGINE 100 [IU]/ML
25 INJECTION, SOLUTION SUBCUTANEOUS DAILY
Qty: 5 PEN | Refills: 3 | Status: SHIPPED | OUTPATIENT
Start: 2020-08-14 | End: 2021-06-29 | Stop reason: SDUPTHER

## 2020-08-14 RX ORDER — LISINOPRIL AND HYDROCHLOROTHIAZIDE 20; 12.5 MG/1; MG/1
1 TABLET ORAL DAILY
Qty: 90 TABLET | Refills: 1 | Status: ON HOLD | OUTPATIENT
Start: 2020-08-14 | End: 2022-07-30 | Stop reason: SDUPTHER

## 2020-08-14 NOTE — ASSESSMENT & PLAN NOTE
Lab Results   Component Value Date    HGBA1C 9 3 (A) 08/14/2020   Recommend restarting on basal insulin at 25 units  Discussed that he did not have medication refills because he has not been coming into the office for follow ups as he should  He also has been called office for refills of his insulin  Recommend diabetic eye exam today and also diabetic foot exam was performed

## 2020-08-14 NOTE — PROGRESS NOTES
Assessment/Plan:    Type 2 diabetes mellitus with hyperglycemia, with long-term current use of insulin ([facility])    Lab Results   Component Value Date    HGBA1C 9 3 (A) 08/14/2020   Recommend restarting on basal insulin at 25 units  Discussed that he did not have medication refills because he has not been coming into the office for follow ups as he should  He also has been called office for refills of his insulin  Recommend diabetic eye exam today and also diabetic foot exam was performed  Essential hypertension  Start lisinopril hydrochlorothiazide and follow-up in 3-4 weeks for blood pressure recheck  Uncomplicated alcohol dependence ([address] )  I recommend that he quit drinking alcohol altogether  I did ask if he wanted help multiple times however he declined  He stated to set he can stop any time he wants  I did discuss with him since he has had so many acute problems with alcohol he is at high risk for having chronic problems and having chronic liver failure    Bilateral hearing loss  Referred to ENT for hearing aids         Problem List Items Addressed This Visit        Endocrine    Type 2 diabetes mellitus with hyperglycemia, with long-term current use of insulin ([facility]) - Primary       Lab Results   Component Value Date    HGBA1C 9 3 (A) 08/14/2020   Recommend restarting on basal insulin at 25 units  Discussed that he did not have medication refills because he has not been coming into the office for follow ups as he should  He also has been called office for refills of his insulin  Recommend diabetic eye exam today and also diabetic foot exam was performed           Relevant Medications    Basaglar KwikPen 100 units/mL injection pen    glucose blood (Accu-Chek Guide) test strip    Other Relevant Orders    POCT hemoglobin A1c (Completed)    IRIS Diabetic eye exam       Cardiovascular and Mediastinum    Essential hypertension     Start lisinopril hydrochlorothiazide and follow-up in 3-4 weeks for blood pressure recheck  Relevant Medications    lisinopril-hydrochlorothiazide (PRINZIDE,ZESTORETIC) 20-12 5 MG per tablet       Nervous and Auditory    Bilateral hearing loss     Referred to ENT for hearing aids         Relevant Orders    Ambulatory Referral to Otolaryngology       Other    Uncomplicated alcohol dependence ([facility] [address] )     I recommend that he quit drinking alcohol altogether  I did ask if he wanted help multiple times however he declined  He stated to set he can stop any time he wants  I did discuss with him since he has had so many acute problems with alcohol he is at high risk for having chronic problems and having chronic liver failure                 Subjective:      Patient ID: Jose Ayers is a 48 y o  male  HPI  59-year-old male with a history alcohol dependence  He has not been seen here in more than 1 year     Patient is not compliant with medications or follow-up visits  He was last hospitalized in March for alcohol intoxication after doing cocaine  He was found with a blood sugar of 42 and was comatose  He was admitted due to hypoglycemia and alcohol intoxication  Ethanol level was 353 upon admission  He did drink daily but now he says that he is not drinking every day  Lajean Cassette He is drinking a lot when he drinks 12-13 cans of alcohol  Or bottle of liquor  He believes he can stop drinking alcohol in time  He drinks because he does not see his kids and has family problems  In regards to his diabetes he does not know when last time he took insulin was  Sometimes he will get some insulin from a friend and will take that  On discharge he was resume to take 25 units of Lantus  He also has not had blood pressure medication in about a here  He denies any headaches or dizziness right now  No chest pain or shortness of breath  He has had a problem hearing for long time in both ears  But it is getting worse      The following portions of the patient's history were reviewed and updated as appropriate:   He  has a past medical history of Alcohol abuse, Chronic pancreatitis ([facility] ), Diabetes mellitus ([place] [address] ), Pancreatitis, Paroxysmal A-fib ([place] [address] ), and Thrombocytopenia ([place] [address] )  He   Patient Active Problem List    Diagnosis Date Noted    Bilateral hearing loss 08/14/2020    Hypoglycemia 03/22/2020    Hypokalemia 03/22/2020    CKD (chronic kidney disease), stage III ([place] [address] ) 02/26/2019    Anemia 02/17/2019    History of atrial fibrillation 02/04/2019    Alcohol intoxication in active alcoholic with complication ([place] [address] ) [id number]/5672    Essential hypertension     Type 2 diabetes mellitus with hyperglycemia, with long-term current use of insulin (HCC)     Uncomplicated alcohol dependence (HCC)     Paroxysmal A-fib (HCC)     Chronic pancreatitis ([facility] )     Thrombocytopenia ([place] [address] )      He  has a past surgical history that includes IR bone marrow biopsy/aspiration (2/7/2019)  His family history includes Diabetes in his mother; Hyperlipidemia in his father; Hypertension in his mother  He  reports that he has quit smoking  He has never used smokeless tobacco  He reports current alcohol use  He reports current drug use  Drug: Cocaine    Current Outpatient Medications   Medication Sig Dispense Refill    Basaglar KwikPen 100 units/mL injection pen Inject 25 Units under the skin daily 5 pen 3    Blood Glucose Monitoring Suppl (ACCU-CHEK GUIDE) w/Device KIT by Does not apply route 3 (three) times a day 1 kit 0    folic acid (FOLVITE) 1 mg tablet Take 1 tablet (1 mg total) by mouth daily (Patient not taking: Reported on 3/22/2020) 30 tablet 1    glucose blood (Accu-Chek Guide) test strip Check blood sugar 3 times a day 100 each 5    Insulin Pen Needle (UNIFINE PENTIPS) 32G X 4 MM MISC Inject as directed daily 100 each 1    Lancets (ACCU-CHEK MULTICLIX) lancets Check blood sugar 3 times a day 100 each 5    lisinopril-hydrochlorothiazide (PRINZIDE,ZESTORETIC) 20-12 5 MG per tablet Take 1 tablet by mouth daily 90 tablet 1    pantoprazole (PROTONIX) 40 mg tablet Take 1 tablet (40 mg total) by mouth daily in the early morning 30 tablet 1    thiamine 100 MG tablet Take 1 tablet (100 mg total) by mouth daily 30 tablet 1     No current facility-administered medications for this visit  Current Outpatient Medications on File Prior to Visit   Medication Sig    Blood Glucose Monitoring Suppl (ACCU-CHEK GUIDE) w/Device KIT by Does not apply route 3 (three) times a day    folic acid (FOLVITE) 1 mg tablet Take 1 tablet (1 mg total) by mouth daily (Patient not taking: Reported on 3/22/2020)    Insulin Pen Needle (UNIFINE PENTIPS) 32G X 4 MM MISC Inject as directed daily    Lancets (ACCU-CHEK MULTICLIX) lancets Check blood sugar 3 times a day    pantoprazole (PROTONIX) 40 mg tablet Take 1 tablet (40 mg total) by mouth daily in the early morning    thiamine 100 MG tablet Take 1 tablet (100 mg total) by mouth daily    [DISCONTINUED] BASAGLAR KWIKPEN 100 units/mL injection pen Inject 25 Units under the skin daily    [DISCONTINUED] glucose blood (ACCU-CHEK GUIDE) test strip Check blood sugar 3 times a day    [DISCONTINUED] lisinopril-hydrochlorothiazide (PRINZIDE,ZESTORETIC) 20-12 5 MG per tablet Take 1 tablet by mouth daily     No current facility-administered medications on file prior to visit  He has No Known Allergies       Review of Systems   Constitutional: Negative for activity change, appetite change, fatigue, fever and unexpected weight change  HENT: Positive for hearing loss  Negative for dental problem, ear pain and sore throat  Eyes: Negative for visual disturbance  Respiratory: Negative for cough and wheezing  Gastrointestinal: Negative for abdominal pain, constipation, diarrhea and vomiting  Genitourinary: Negative for difficulty urinating and dysuria  Musculoskeletal: Negative for back pain and myalgias  Skin: Negative for rash     Neurological: Negative for dizziness and headaches  Psychiatric/Behavioral: Negative for dysphoric mood  Objective:      /100 (BP Location: Left arm, Patient Position: Sitting, Cuff Size: Standard)   Pulse 91   Temp (!) 96 9 °F (36 1 °C) (Temporal)   Resp 16   Ht 5' 5" (1 651 m)   Wt 64 9 kg (143 lb)   SpO2 98%   BMI 23 80 kg/m²          Physical Exam  Vitals signs and nursing note reviewed  Constitutional:       General: He is not in acute distress  Appearance: He is well-developed  HENT:      Head: Normocephalic and atraumatic  Right Ear: Tympanic membrane, ear canal and external ear normal  There is no impacted cerumen  Left Ear: Tympanic membrane, ear canal and external ear normal  There is no impacted cerumen  Eyes:      Conjunctiva/sclera: Conjunctivae normal    Neck:      Musculoskeletal: Normal range of motion and neck supple  Thyroid: No thyromegaly  Cardiovascular:      Rate and Rhythm: Normal rate and regular rhythm  Pulses:           Dorsalis pedis pulses are 2+ on the right side and 2+ on the left side  Posterior tibial pulses are 2+ on the right side and 2+ on the left side  Heart sounds: Normal heart sounds  No murmur  Pulmonary:      Effort: Pulmonary effort is normal  No respiratory distress  Breath sounds: Normal breath sounds  No wheezing  Feet:      Right foot:      Skin integrity: No ulcer, skin breakdown, erythema, warmth, callus or dry skin  Left foot:      Skin integrity: Dry skin present  No ulcer, skin breakdown, erythema, warmth or callus  Lymphadenopathy:      Cervical: No cervical adenopathy  Neurological:      Mental Status: He is alert and oriented to person, place, and time  Psychiatric:         Behavior: Behavior normal        Patient's shoes and socks removed  Right Foot/Ankle   Right Foot Inspection  Skin Exam: skin normal and skin intact no dry skin, no warmth, no callus, no erythema, no maceration, no abnormal color, no pre-ulcer, no ulcer and no callus                          Toe Exam: ROM and strength within normal limits  Sensory   Vibration: intact    Monofilament testing: intact  Vascular    The right DP pulse is 2+  The right PT pulse is 2+  Left Foot/Ankle  Left Foot Inspection  Skin Exam: skin normal, skin intact and dry skinno warmth, no erythema, no maceration, normal color, no pre-ulcer, no ulcer and no callus                         Toe Exam: ROM and strength within normal limits                   Sensory   Vibration: intact    Monofilament: intact  Vascular    The left DP pulse is 2+  The left PT pulse is 2+  Assign Risk Category:  No deformity present;  No loss of protective sensation;        Risk: 0

## 2020-08-14 NOTE — ASSESSMENT & PLAN NOTE
I recommend that he quit drinking alcohol altogether  I did ask if he wanted help multiple times however he declined  He stated to set he can stop any time he wants    I did discuss with him since he has had so many acute problems with alcohol he is at high risk for having chronic problems and having chronic liver failure

## 2020-08-15 ENCOUNTER — APPOINTMENT (EMERGENCY)
Dept: CT IMAGING | Facility: HOSPITAL | Age: 50
DRG: 364 | End: 2020-08-15
Payer: COMMERCIAL

## 2020-08-15 ENCOUNTER — HOSPITAL ENCOUNTER (INPATIENT)
Facility: HOSPITAL | Age: 50
LOS: 2 days | Discharge: HOME | DRG: 364 | End: 2020-08-18
Attending: FAMILY MEDICINE | Admitting: FAMILY MEDICINE
Payer: COMMERCIAL

## 2020-08-15 ENCOUNTER — HOSPITAL ENCOUNTER (EMERGENCY)
Facility: HOSPITAL | Age: 50
DRG: 364 | End: 2020-08-15
Attending: EMERGENCY MEDICINE | Admitting: EMERGENCY MEDICINE
Payer: COMMERCIAL

## 2020-08-15 VITALS
TEMPERATURE: 98.4 F | SYSTOLIC BLOOD PRESSURE: 182 MMHG | HEART RATE: 81 BPM | DIASTOLIC BLOOD PRESSURE: 86 MMHG | WEIGHT: 140.21 LBS | OXYGEN SATURATION: 99 % | RESPIRATION RATE: 18 BRPM | BODY MASS INDEX: 23.33 KG/M2

## 2020-08-15 DIAGNOSIS — L03.211 FACIAL CELLULITIS: Primary | ICD-10-CM

## 2020-08-15 DIAGNOSIS — L02.01 FACIAL ABSCESS: ICD-10-CM

## 2020-08-15 DIAGNOSIS — I10 ACCELERATED HYPERTENSION: ICD-10-CM

## 2020-08-15 LAB
ALBUMIN SERPL BCP-MCNC: 3.6 G/DL (ref 3.5–5)
ALP SERPL-CCNC: 83 U/L (ref 46–116)
ALT SERPL W P-5'-P-CCNC: 22 U/L (ref 12–78)
ANION GAP SERPL CALCULATED.3IONS-SCNC: 10 MMOL/L (ref 4–13)
AST SERPL W P-5'-P-CCNC: 12 U/L (ref 5–45)
BASOPHILS # BLD AUTO: 0.03 THOUSANDS/ΜL (ref 0–0.1)
BASOPHILS NFR BLD AUTO: 0 % (ref 0–1)
BILIRUB SERPL-MCNC: 0.73 MG/DL (ref 0.2–1)
BUN SERPL-MCNC: 8 MG/DL (ref 5–25)
CALCIUM SERPL-MCNC: 9.1 MG/DL (ref 8.3–10.1)
CHLORIDE SERPL-SCNC: 99 MMOL/L (ref 100–108)
CO2 SERPL-SCNC: 28 MMOL/L (ref 21–32)
CREAT SERPL-MCNC: 1.02 MG/DL (ref 0.6–1.3)
EOSINOPHIL # BLD AUTO: 0.28 THOUSAND/ΜL (ref 0–0.61)
EOSINOPHIL NFR BLD AUTO: 2 % (ref 0–6)
ERYTHROCYTE [DISTWIDTH] IN BLOOD BY AUTOMATED COUNT: 11.4 % (ref 11.6–15.1)
GFR SERPL CREATININE-BSD FRML MDRD: 85 ML/MIN/1.73SQ M
GLUCOSE SERPL-MCNC: 380 MG/DL (ref 65–140)
HCT VFR BLD AUTO: 41.6 % (ref 36.5–49.3)
HGB BLD-MCNC: 14.9 G/DL (ref 12–17)
IMM GRANULOCYTES # BLD AUTO: 0.08 THOUSAND/UL (ref 0–0.2)
IMM GRANULOCYTES NFR BLD AUTO: 1 % (ref 0–2)
LYMPHOCYTES # BLD AUTO: 2.18 THOUSANDS/ΜL (ref 0.6–4.47)
LYMPHOCYTES NFR BLD AUTO: 15 % (ref 14–44)
MCH RBC QN AUTO: 31.7 PG (ref 26.8–34.3)
MCHC RBC AUTO-ENTMCNC: 35.8 G/DL (ref 31.4–37.4)
MCV RBC AUTO: 89 FL (ref 82–98)
MONOCYTES # BLD AUTO: 1.13 THOUSAND/ΜL (ref 0.17–1.22)
MONOCYTES NFR BLD AUTO: 8 % (ref 4–12)
NEUTROPHILS # BLD AUTO: 10.46 THOUSANDS/ΜL (ref 1.85–7.62)
NEUTS SEG NFR BLD AUTO: 74 % (ref 43–75)
NRBC BLD AUTO-RTO: 0 /100 WBCS
PLATELET # BLD AUTO: 246 THOUSANDS/UL (ref 149–390)
PMV BLD AUTO: 11.2 FL (ref 8.9–12.7)
POTASSIUM SERPL-SCNC: 3.2 MMOL/L (ref 3.5–5.3)
PROT SERPL-MCNC: 8.3 G/DL (ref 6.4–8.2)
RBC # BLD AUTO: 4.7 MILLION/UL (ref 3.88–5.62)
SODIUM SERPL-SCNC: 137 MMOL/L (ref 136–145)
WBC # BLD AUTO: 14.16 THOUSAND/UL (ref 4.31–10.16)

## 2020-08-15 PROCEDURE — 96361 HYDRATE IV INFUSION ADD-ON: CPT

## 2020-08-15 PROCEDURE — 70487 CT MAXILLOFACIAL W/DYE: CPT

## 2020-08-15 PROCEDURE — 87040 BLOOD CULTURE FOR BACTERIA: CPT | Performed by: EMERGENCY MEDICINE

## 2020-08-15 PROCEDURE — 36415 COLL VENOUS BLD VENIPUNCTURE: CPT | Performed by: EMERGENCY MEDICINE

## 2020-08-15 PROCEDURE — 96375 TX/PRO/DX INJ NEW DRUG ADDON: CPT

## 2020-08-15 PROCEDURE — G1004 CDSM NDSC: HCPCS

## 2020-08-15 PROCEDURE — 99291 CRITICAL CARE FIRST HOUR: CPT | Performed by: EMERGENCY MEDICINE

## 2020-08-15 PROCEDURE — 99285 EMERGENCY DEPT VISIT HI MDM: CPT

## 2020-08-15 PROCEDURE — 80053 COMPREHEN METABOLIC PANEL: CPT | Performed by: EMERGENCY MEDICINE

## 2020-08-15 PROCEDURE — 85025 COMPLETE CBC W/AUTO DIFF WBC: CPT | Performed by: EMERGENCY MEDICINE

## 2020-08-15 PROCEDURE — 96365 THER/PROPH/DIAG IV INF INIT: CPT

## 2020-08-15 RX ORDER — KETOROLAC TROMETHAMINE 30 MG/ML
30 INJECTION, SOLUTION INTRAMUSCULAR; INTRAVENOUS ONCE
Status: COMPLETED | OUTPATIENT
Start: 2020-08-15 | End: 2020-08-15

## 2020-08-15 RX ORDER — HYDRALAZINE HYDROCHLORIDE 20 MG/ML
5 INJECTION INTRAMUSCULAR; INTRAVENOUS ONCE
Status: COMPLETED | OUTPATIENT
Start: 2020-08-15 | End: 2020-08-15

## 2020-08-15 RX ORDER — CLINDAMYCIN PHOSPHATE 600 MG/50ML
600 INJECTION INTRAVENOUS ONCE
Status: COMPLETED | OUTPATIENT
Start: 2020-08-15 | End: 2020-08-15

## 2020-08-15 RX ADMIN — SODIUM CHLORIDE 1000 ML: 0.9 INJECTION, SOLUTION INTRAVENOUS at 19:22

## 2020-08-15 RX ADMIN — HYDRALAZINE HYDROCHLORIDE 5 MG: 20 INJECTION INTRAMUSCULAR; INTRAVENOUS at 22:42

## 2020-08-15 RX ADMIN — CLINDAMYCIN IN 5 PERCENT DEXTROSE 600 MG: 12 INJECTION, SOLUTION INTRAVENOUS at 18:37

## 2020-08-15 RX ADMIN — KETOROLAC TROMETHAMINE 30 MG: 30 INJECTION, SOLUTION INTRAMUSCULAR at 18:37

## 2020-08-15 RX ADMIN — IOHEXOL 85 ML: 350 INJECTION, SOLUTION INTRAVENOUS at 19:23

## 2020-08-15 NOTE — ED PROVIDER NOTES
History  Chief Complaint   Patient presents with    Facial Swelling     pt reports right sided facial swelling/pain since sunday  reports intermittent upper right dental pain  denies injury  History provided by:  Patient   used: No    Medical Problem   Location:  Right side of face  Quality:  Swelling of right side of face  Severity:  Moderate  Onset quality:  Gradual  Duration:  1 week  Timing:  Constant  Progression:  Worsening  Chronicity:  New  Context:  Swelling of right side of face for ahout 1 week, on Ampicillin without relief  Relieved by:  Nothing  Worsened by:  None  Ineffective treatments:  Ampicillin  Associated symptoms: no abdominal pain, no chest pain, no cough, no fever, no headaches, no loss of consciousness, no nausea, no rash, no shortness of breath, no sore throat and no vomiting        Prior to Admission Medications   Prescriptions Last Dose Informant Patient Reported? Taking?    Basaglar KwikPen 100 units/mL injection pen   No Yes   Sig: Inject 25 Units under the skin daily   Blood Glucose Monitoring Suppl (ACCU-CHEK GUIDE) w/Device KIT   No No   Sig: by Does not apply route 3 (three) times a day   Insulin Pen Needle (UNIFINE PENTIPS) 32G X 4 MM MISC   No No   Sig: Inject as directed daily   Lancets (ACCU-CHEK MULTICLIX) lancets   No No   Sig: Check blood sugar 3 times a day   folic acid (FOLVITE) 1 mg tablet   No No   Sig: Take 1 tablet (1 mg total) by mouth daily   Patient not taking: Reported on 3/22/2020   glucose blood (Accu-Chek Guide) test strip   No No   Sig: Check blood sugar 3 times a day   lisinopril-hydrochlorothiazide (PRINZIDE,ZESTORETIC) 20-12 5 MG per tablet   No Yes   Sig: Take 1 tablet by mouth daily   Patient not taking: Reported on 8/15/2020   pantoprazole (PROTONIX) 40 mg tablet   No No   Sig: Take 1 tablet (40 mg total) by mouth daily in the early morning   Patient not taking: Reported on 8/15/2020   thiamine 100 MG tablet   No No   Sig: Take 1 tablet (100 mg total) by mouth daily   Patient not taking: Reported on 8/15/2020      Facility-Administered Medications: None       Past Medical History:   Diagnosis Date    Alcohol abuse     Chronic pancreatitis ([facility] [address] )     Diabetes mellitus (HCC)     Type 2    Pancreatitis     Paroxysmal A-fib (HCC)     Thrombocytopenia (HCC)        Past Surgical History:   Procedure Laterality Date    IR BONE MARROW BIOPSY/ASPIRATION  2/7/2019       Family History   Problem Relation Age of Onset    Diabetes Mother     Hypertension Mother     Hyperlipidemia Father      I have reviewed and agree with the history as documented  E-Cigarette/Vaping     E-Cigarette/Vaping Substances     Social History     Tobacco Use    Smoking status: Former Smoker    Smokeless tobacco: Never Used   Substance Use Topics    Alcohol use: Yes     Frequency: 2-3 times a week     Drinks per session: 3 or 4     Binge frequency: Weekly    Drug use: Yes     Types: Cocaine       Review of Systems   Constitutional: Negative for activity change, chills and fever  HENT: Positive for facial swelling  Negative for sore throat and trouble swallowing  Eyes: Negative for pain and visual disturbance  Respiratory: Negative for cough, chest tightness and shortness of breath  Cardiovascular: Negative for chest pain and leg swelling  Gastrointestinal: Negative for abdominal pain, nausea and vomiting  Genitourinary: Negative for dysuria and flank pain  Musculoskeletal: Negative for back pain, neck pain and neck stiffness  Skin: Negative for pallor and rash  Allergic/Immunologic: Negative for environmental allergies and immunocompromised state  Neurological: Negative for dizziness, loss of consciousness and headaches  Hematological: Negative for adenopathy  Does not bruise/bleed easily  Psychiatric/Behavioral: Negative for agitation and behavioral problems  All other systems reviewed and are negative        Physical Exam  Physical Exam  Vitals signs and nursing note reviewed  Constitutional:       General: He is not in acute distress  Appearance: He is well-developed  HENT:      Head: Normocephalic and atraumatic  Comments: Swelling of right side of face involving cheek up to just below the right lower eye lid, no trismus, no drooling  Neck:      Musculoskeletal: Normal range of motion and neck supple  Cardiovascular:      Rate and Rhythm: Normal rate and regular rhythm  Heart sounds: Normal heart sounds  Pulmonary:      Effort: Pulmonary effort is normal       Breath sounds: Normal breath sounds  Abdominal:      General: Bowel sounds are normal       Palpations: Abdomen is soft  Tenderness: There is no abdominal tenderness  There is no guarding or rebound  Musculoskeletal: Normal range of motion  Skin:     General: Skin is warm and dry  Neurological:      Mental Status: He is alert and oriented to person, place, and time           Vital Signs  ED Triage Vitals   Temperature Pulse Respirations Blood Pressure SpO2   08/15/20 1735 08/15/20 1735 08/15/20 1735 08/15/20 1736 08/15/20 1735   98 4 °F (36 9 °C) 93 16 (!) 182/105 97 %      Temp Source Heart Rate Source Patient Position - Orthostatic VS BP Location FiO2 (%)   08/15/20 1735 08/15/20 1735 08/15/20 1735 08/15/20 1735 --   Temporal Monitor Sitting Right arm       Pain Score       08/15/20 1837       7           Vitals:    08/15/20 1736 08/15/20 2010 08/15/20 2234 08/15/20 2254   BP: (!) 182/105 (!) 186/91 (!) 217/95 (!) 182/86   Pulse:  79 76 81   Patient Position - Orthostatic VS:  Lying Lying Lying         Visual Acuity      ED Medications  Medications   ketorolac (TORADOL) injection 30 mg (30 mg Intravenous Given 8/15/20 1837)   clindamycin (CLEOCIN) IVPB (premix) 600 mg 50 mL (0 mg Intravenous Stopped 8/15/20 1922)   sodium chloride 0 9 % bolus 1,000 mL (0 mL Intravenous Stopped 8/15/20 2118)   iohexol (OMNIPAQUE) 350 MG/ML injection (MULTI-DOSE) 85 mL (85 mL Intravenous Given 8/15/20 1923)   hydrALAZINE (APRESOLINE) injection 5 mg (5 mg Intravenous Given 8/15/20 2242)       Diagnostic Studies  Results Reviewed     Procedure Component Value Units Date/Time    Blood culture #1 [[id number]] Collected:  08/15/20 1837    Lab Status:  Preliminary result Specimen:  Blood from Hand, Left Updated:  08/16/20 0001     Blood Culture Received in Microbiology Lab  Culture in Progress  Blood culture #2 [[id number]] Collected:  08/15/20 1837    Lab Status:  Preliminary result Specimen:  Blood from Arm, Left Updated:  08/16/20 0001     Blood Culture Received in Microbiology Lab  Culture in Progress      Comprehensive metabolic panel [[id number]]  (Abnormal) Collected:  08/15/20 1837    Lab Status:  Final result Specimen:  Blood from Arm, Right Updated:  08/15/20 1900     Sodium 137 mmol/L      Potassium 3 2 mmol/L      Chloride 99 mmol/L      CO2 28 mmol/L      ANION GAP 10 mmol/L      BUN 8 mg/dL      Creatinine 1 02 mg/dL      Glucose 380 mg/dL      Calcium 9 1 mg/dL      AST 12 U/L      ALT 22 U/L      Alkaline Phosphatase 83 U/L      Total Protein 8 3 g/dL      Albumin 3 6 g/dL      Total Bilirubin 0 73 mg/dL      eGFR 85 ml/min/1 73sq m     Narrative:       [place] guidelines for Chronic Kidney Disease (CKD):     Stage 1 with normal or high GFR (GFR > 90 mL/min/1 73 square meters)    Stage 2 Mild CKD (GFR = 60-89 mL/min/1 73 square meters)    Stage 3A Moderate CKD (GFR = 45-59 mL/min/1 73 square meters)    Stage 3B Moderate CKD (GFR = 30-44 mL/min/1 73 square meters)    Stage 4 Severe CKD (GFR = 15-29 mL/min/1 73 square meters)    Stage 5 End Stage CKD (GFR <15 mL/min/1 73 square meters)  Note: GFR calculation is accurate only with a steady state creatinine    CBC and differential [[id number]]  (Abnormal) Collected:  08/15/20 1837    Lab Status:  Final result Specimen:  Blood from Arm, Right Updated:  08/15/20 1848     WBC 14 16 Thousand/uL RBC 4 70 Million/uL      Hemoglobin 14 9 g/dL      Hematocrit 41 6 %      MCV 89 fL      MCH 31 7 pg      MCHC 35 8 g/dL      RDW 11 4 %      MPV 11 2 fL      Platelets 887 Thousands/uL      nRBC 0 /100 WBCs      Neutrophils Relative 74 %      Immat GRANS % 1 %      Lymphocytes Relative 15 %      Monocytes Relative 8 %      Eosinophils Relative 2 %      Basophils Relative 0 %      Neutrophils Absolute 10 46 Thousands/µL      Immature Grans Absolute 0 08 Thousand/uL      Lymphocytes Absolute 2 18 Thousands/µL      Monocytes Absolute 1 13 Thousand/µL      Eosinophils Absolute 0 28 Thousand/µL      Basophils Absolute 0 03 Thousands/µL                  CT facial bones with contrast   Final Result by Fabiana Snider MD (08/15 2009)         1  Right facial abscess measuring 2 2 x 1 3 x 1 9 cm, deep to the right zygomaticus musculature and in direct continuity with the buccal aspect of the right maxillary ridge, probably subperiosteal in location at the level of the 3rd tooth where there is    discontinuity of the lateral cortex of the maxillary ridge and where there may be some continuity with some chronic maxillary sinus mucosal disease  There is overlying moderate facial cellulitis and swelling present as well  The study was marked in [facility] for immediate notification           Workstation performed: [id number]                    Procedures  CriticalCare Time  Performed by: Sravani Narayanan MD  Authorized by: Sravani Narayanan MD     Critical care provider statement:     Critical care time (minutes):  30    Critical care time was exclusive of:  Separately billable procedures and treating other patients and teaching time    Critical care was necessary to treat or prevent imminent or life-threatening deterioration of the following conditions:  Circulatory failure (Accelerated hypertension required IV med)    Critical care was time spent personally by me on the following activities:  Blood draw for specimens, obtaining history from patient or surrogate, development of treatment plan with patient or surrogate, discussions with consultants, evaluation of patient's response to treatment, examination of patient, interpretation of cardiac output measurements, ordering and performing treatments and interventions, ordering and review of laboratory studies, ordering and review of radiographic studies, re-evaluation of patient's condition and review of old charts    I assumed direction of critical care for this patient from another provider in my specialty: no               ED Course  ED Course as of Aug 16 0027   Sat Aug 15, 2020   1918 WBC(!): 14 16   1919 Hemoglobin: 14 9   1919 Platelet Count: 639   1919 Sodium: [address] Potassium(!): 3 2   1919 CO2: [address]: 10   1919 BUN: 8   1919 Creatinine: 1 02 1919 Labs reviewed, WBC, Glucose noted, no acidosis, normal anion gap  We will give IVF  Glucose, Random(!): 380   2014 CT Face noted:    IMPRESSION:        1  Right facial abscess measuring 2 2 x 1 3 x 1 9 cm, deep to the right zygomaticus musculature and in direct continuity with the buccal aspect of the right maxillary ridge, probably subperiosteal in location at the level of the 3rd tooth where there is   discontinuity of the lateral cortex of the maxillary ridge and where there may be some continuity with some chronic maxillary sinus mucosal disease  There is overlying moderate facial cellulitis and swelling present as well  [facility]  2028 Case discussed with Dr Maliha Mirza, S, advised to tranSfer to [facility] under [facility]  2041 Patient informed about CT results, need for transfer to [facility], patient ok with the plan  2238 BP checked again, 561 systolic, we will give Hydralazine  PACS informed to convey to [facility] at [address] [place] [address] Discussed with Dr Kassandra Arroyo, Senior Resident, [facility], informed about blood pressure, Hydralazine given  MDM  Number of Diagnoses or Management Options  Accelerated hypertension: new and requires workup  Facial abscess:   Facial cellulitis: new and requires workup  Diagnosis management comments: Impression:  Right-sided facial swelling, facial cellulitis, history of diabetes, on ampicillin without relief will check labs, give clindamycin Toradol get CT scan face rule out abscess  Amount and/or Complexity of Data Reviewed  Clinical lab tests: reviewed and ordered  Tests in the radiology section of CPT®: ordered and reviewed  Tests in the medicine section of CPT®: ordered and reviewed  Discuss the patient with other providers: yes  Independent visualization of images, tracings, or specimens: yes          Disposition  Final diagnoses:   Facial cellulitis   Facial abscess   Accelerated hypertension     Time reflects when diagnosis was documented in both MDM as applicable and the Disposition within this note     Time User Action Codes Description Comment    8/15/2020  6:16  [facility], [address] [place] [address] [L72 826] Facial cellulitis     8/15/2020  8:28 PM Curvin Baptise Add [L02 01] Facial abscess     8/16/2020 12:26 AM Jai, [address] [place] [address] Accelerated hypertension       ED Disposition     ED Disposition Condition Date/Time Comment    Transfer to Another Facility-In Network  KFY Aug 15, 2020  9:02 PM Angeline Jaquez should be transferred out to [facility]          MD Documentation      Most Recent Value   Patient Condition  The patient has been stabilized such that within reasonable medical probability, no material deterioration of the patient condition or the condition of the unborn child(stephen) is likely to result from the transfer   Reason for Transfer  Level of Care needed not available at this facility   Benefits of Transfer  Continuity of care   Risks of Transfer  Potential for delay in receiving treatment   Accepting Physician  Dr Ace Rodney Name, [facility] [address], [facility]    (Name & Tel number)  Ms Saab Albertjake   Sending MD Dr Emeterio Barnett   Provider Certification  General risk, such as traffic hazards, adverse weather conditions, rough terrain or turbulence, possible failure of equipment (including vehicle or aircraft), or consequences of actions of persons outside the control of the transport personnel      RN Documentation      [facility], [address], [facility]    (Name & Tel number)  Ms Katiana Warner      Follow-up Information    None         Discharge Medication List as of 8/15/2020 11:07 PM      CONTINUE these medications which have NOT CHANGED    Details   Basaglar KwikPen 100 units/mL injection pen Inject 25 Units under the skin daily, Starting Fri 8/14/2020, Normal      lisinopril-hydrochlorothiazide (PRINZIDE,ZESTORETIC) 20-12 5 MG per tablet Take 1 tablet by mouth daily, Starting Fri 8/14/2020, Normal      Blood Glucose Monitoring Suppl (ACCU-CHEK GUIDE) w/Device KIT by Does not apply route 3 (three) times a day, Starting Thu 1/32/7144, Normal      folic acid (FOLVITE) 1 mg tablet Take 1 tablet (1 mg total) by mouth daily, Starting Thu 6/27/2019, Normal      glucose blood (Accu-Chek Guide) test strip Check blood sugar 3 times a day, Normal      Insulin Pen Needle (UNIFINE PENTIPS) 32G X 4 MM MISC Inject as directed daily, Starting Thu 6/27/2019, Normal      Lancets (ACCU-CHEK MULTICLIX) lancets Check blood sugar 3 times a day, Normal      pantoprazole (PROTONIX) 40 mg tablet Take 1 tablet (40 mg total) by mouth daily in the early morning, Starting Thu 6/27/2019, Normal      thiamine 100 MG tablet Take 1 tablet (100 mg total) by mouth daily, Starting Thu 6/27/2019, Normal           No discharge procedures on file      PDMP Review     None          ED Provider  Electronically Signed by           Campbell Cruz MD  08/16/20 8276

## 2020-08-16 ENCOUNTER — ANESTHESIA (OUTPATIENT)
Dept: PERIOP | Facility: HOSPITAL | Age: 50
DRG: 364 | End: 2020-08-16
Payer: COMMERCIAL

## 2020-08-16 ENCOUNTER — APPOINTMENT (OUTPATIENT)
Dept: RADIOLOGY | Facility: HOSPITAL | Age: 50
DRG: 364 | End: 2020-08-16
Payer: COMMERCIAL

## 2020-08-16 ENCOUNTER — ANESTHESIA EVENT (OUTPATIENT)
Dept: PERIOP | Facility: HOSPITAL | Age: 50
DRG: 364 | End: 2020-08-16
Payer: COMMERCIAL

## 2020-08-16 PROBLEM — L03.211 FACIAL CELLULITIS: Status: ACTIVE | Noted: 2020-08-16

## 2020-08-16 LAB
ANION GAP SERPL CALCULATED.3IONS-SCNC: 6 MMOL/L (ref 4–13)
BASOPHILS # BLD AUTO: 0.03 THOUSANDS/ΜL (ref 0–0.1)
BASOPHILS NFR BLD AUTO: 0 % (ref 0–1)
BUN SERPL-MCNC: 10 MG/DL (ref 5–25)
CALCIUM SERPL-MCNC: 7.7 MG/DL (ref 8.3–10.1)
CHLORIDE SERPL-SCNC: 110 MMOL/L (ref 100–108)
CO2 SERPL-SCNC: 25 MMOL/L (ref 21–32)
CREAT SERPL-MCNC: 0.73 MG/DL (ref 0.6–1.3)
EOSINOPHIL # BLD AUTO: 0.25 THOUSAND/ΜL (ref 0–0.61)
EOSINOPHIL NFR BLD AUTO: 2 % (ref 0–6)
ERYTHROCYTE [DISTWIDTH] IN BLOOD BY AUTOMATED COUNT: 11.6 % (ref 11.6–15.1)
GFR SERPL CREATININE-BSD FRML MDRD: 108 ML/MIN/1.73SQ M
GLUCOSE P FAST SERPL-MCNC: 189 MG/DL (ref 65–99)
GLUCOSE SERPL-MCNC: 103 MG/DL (ref 65–140)
GLUCOSE SERPL-MCNC: 110 MG/DL (ref 65–140)
GLUCOSE SERPL-MCNC: 148 MG/DL (ref 65–140)
GLUCOSE SERPL-MCNC: 189 MG/DL (ref 65–140)
GLUCOSE SERPL-MCNC: 309 MG/DL (ref 65–140)
HCT VFR BLD AUTO: 33.9 % (ref 36.5–49.3)
HGB BLD-MCNC: 12.5 G/DL (ref 12–17)
IMM GRANULOCYTES # BLD AUTO: 0.06 THOUSAND/UL (ref 0–0.2)
IMM GRANULOCYTES NFR BLD AUTO: 0 % (ref 0–2)
LYMPHOCYTES # BLD AUTO: 2.71 THOUSANDS/ΜL (ref 0.6–4.47)
LYMPHOCYTES NFR BLD AUTO: 19 % (ref 14–44)
MAGNESIUM SERPL-MCNC: 1.8 MG/DL (ref 1.6–2.6)
MCH RBC QN AUTO: 32.1 PG (ref 26.8–34.3)
MCHC RBC AUTO-ENTMCNC: 36.9 G/DL (ref 31.4–37.4)
MCV RBC AUTO: 87 FL (ref 82–98)
MONOCYTES # BLD AUTO: 1.29 THOUSAND/ΜL (ref 0.17–1.22)
MONOCYTES NFR BLD AUTO: 9 % (ref 4–12)
NEUTROPHILS # BLD AUTO: 9.76 THOUSANDS/ΜL (ref 1.85–7.62)
NEUTS SEG NFR BLD AUTO: 70 % (ref 43–75)
NRBC BLD AUTO-RTO: 0 /100 WBCS
PLATELET # BLD AUTO: 214 THOUSANDS/UL (ref 149–390)
PLATELET # BLD AUTO: 217 THOUSANDS/UL (ref 149–390)
PMV BLD AUTO: 10.9 FL (ref 8.9–12.7)
PMV BLD AUTO: 11.2 FL (ref 8.9–12.7)
POTASSIUM SERPL-SCNC: 2.6 MMOL/L (ref 3.5–5.3)
POTASSIUM SERPL-SCNC: 3.3 MMOL/L (ref 3.5–5.3)
RBC # BLD AUTO: 3.89 MILLION/UL (ref 3.88–5.62)
SARS-COV-2 RNA RESP QL NAA+PROBE: NEGATIVE
SODIUM SERPL-SCNC: 141 MMOL/L (ref 136–145)
WBC # BLD AUTO: 14.1 THOUSAND/UL (ref 4.31–10.16)

## 2020-08-16 PROCEDURE — 82948 REAGENT STRIP/BLOOD GLUCOSE: CPT

## 2020-08-16 PROCEDURE — G0379 DIRECT REFER HOSPITAL OBSERV: HCPCS

## 2020-08-16 PROCEDURE — 85049 AUTOMATED PLATELET COUNT: CPT | Performed by: FAMILY MEDICINE

## 2020-08-16 PROCEDURE — 87075 CULTR BACTERIA EXCEPT BLOOD: CPT | Performed by: DENTIST

## 2020-08-16 PROCEDURE — 87070 CULTURE OTHR SPECIMN AEROBIC: CPT | Performed by: DENTIST

## 2020-08-16 PROCEDURE — 0CDWXZ1 EXTRACTION OF UPPER TOOTH, MULTIPLE, EXTERNAL APPROACH: ICD-10-PCS | Performed by: DENTIST

## 2020-08-16 PROCEDURE — 84132 ASSAY OF SERUM POTASSIUM: CPT | Performed by: FAMILY MEDICINE

## 2020-08-16 PROCEDURE — 93005 ELECTROCARDIOGRAM TRACING: CPT

## 2020-08-16 PROCEDURE — 85025 COMPLETE CBC W/AUTO DIFF WBC: CPT | Performed by: FAMILY MEDICINE

## 2020-08-16 PROCEDURE — 87205 SMEAR GRAM STAIN: CPT | Performed by: DENTIST

## 2020-08-16 PROCEDURE — 99220 PR INITIAL OBSERVATION CARE/DAY 70 MINUTES: CPT | Performed by: FAMILY MEDICINE

## 2020-08-16 PROCEDURE — 80048 BASIC METABOLIC PNL TOTAL CA: CPT | Performed by: FAMILY MEDICINE

## 2020-08-16 PROCEDURE — 0W940ZZ DRAINAGE OF UPPER JAW, OPEN APPROACH: ICD-10-PCS | Performed by: DENTIST

## 2020-08-16 PROCEDURE — U0003 INFECTIOUS AGENT DETECTION BY NUCLEIC ACID (DNA OR RNA); SEVERE ACUTE RESPIRATORY SYNDROME CORONAVIRUS 2 (SARS-COV-2) (CORONAVIRUS DISEASE [COVID-19]), AMPLIFIED PROBE TECHNIQUE, MAKING USE OF HIGH THROUGHPUT TECHNOLOGIES AS DESCRIBED BY CMS-2020-01-R: HCPCS | Performed by: STUDENT IN AN ORGANIZED HEALTH CARE EDUCATION/TRAINING PROGRAM

## 2020-08-16 PROCEDURE — 83735 ASSAY OF MAGNESIUM: CPT | Performed by: FAMILY MEDICINE

## 2020-08-16 RX ORDER — SODIUM CHLORIDE AND POTASSIUM CHLORIDE .9; .15 G/100ML; G/100ML
125 SOLUTION INTRAVENOUS CONTINUOUS
Status: DISCONTINUED | OUTPATIENT
Start: 2020-08-16 | End: 2020-08-17

## 2020-08-16 RX ORDER — FENTANYL CITRATE/PF 50 MCG/ML
25 SYRINGE (ML) INJECTION
Status: COMPLETED | OUTPATIENT
Start: 2020-08-16 | End: 2020-08-16

## 2020-08-16 RX ORDER — ONDANSETRON 2 MG/ML
INJECTION INTRAMUSCULAR; INTRAVENOUS AS NEEDED
Status: DISCONTINUED | OUTPATIENT
Start: 2020-08-16 | End: 2020-08-16

## 2020-08-16 RX ORDER — ONDANSETRON 2 MG/ML
4 INJECTION INTRAMUSCULAR; INTRAVENOUS ONCE AS NEEDED
Status: DISCONTINUED | OUTPATIENT
Start: 2020-08-16 | End: 2020-08-16 | Stop reason: HOSPADM

## 2020-08-16 RX ORDER — POTASSIUM CHLORIDE 14.9 MG/ML
20 INJECTION INTRAVENOUS
Status: COMPLETED | OUTPATIENT
Start: 2020-08-16 | End: 2020-08-16

## 2020-08-16 RX ORDER — SODIUM CHLORIDE, SODIUM LACTATE, POTASSIUM CHLORIDE, CALCIUM CHLORIDE 600; 310; 30; 20 MG/100ML; MG/100ML; MG/100ML; MG/100ML
50 INJECTION, SOLUTION INTRAVENOUS CONTINUOUS
Status: DISCONTINUED | OUTPATIENT
Start: 2020-08-16 | End: 2020-08-17

## 2020-08-16 RX ORDER — AMPICILLIN 1 G/1
INJECTION, POWDER, FOR SOLUTION INTRAMUSCULAR; INTRAVENOUS AS NEEDED
Status: DISCONTINUED | OUTPATIENT
Start: 2020-08-16 | End: 2020-08-16

## 2020-08-16 RX ORDER — POTASSIUM CHLORIDE 29.8 MG/ML
40 INJECTION INTRAVENOUS ONCE
Status: DISCONTINUED | OUTPATIENT
Start: 2020-08-16 | End: 2020-08-16

## 2020-08-16 RX ORDER — FENTANYL CITRATE 50 UG/ML
INJECTION, SOLUTION INTRAMUSCULAR; INTRAVENOUS AS NEEDED
Status: DISCONTINUED | OUTPATIENT
Start: 2020-08-16 | End: 2020-08-16

## 2020-08-16 RX ORDER — POTASSIUM CHLORIDE 20 MEQ/1
40 TABLET, EXTENDED RELEASE ORAL ONCE
Status: COMPLETED | OUTPATIENT
Start: 2020-08-16 | End: 2020-08-16

## 2020-08-16 RX ORDER — MIDAZOLAM HYDROCHLORIDE 2 MG/2ML
INJECTION, SOLUTION INTRAMUSCULAR; INTRAVENOUS AS NEEDED
Status: DISCONTINUED | OUTPATIENT
Start: 2020-08-16 | End: 2020-08-16

## 2020-08-16 RX ORDER — LIDOCAINE HYDROCHLORIDE 10 MG/ML
INJECTION, SOLUTION EPIDURAL; INFILTRATION; INTRACAUDAL; PERINEURAL AS NEEDED
Status: DISCONTINUED | OUTPATIENT
Start: 2020-08-16 | End: 2020-08-16

## 2020-08-16 RX ORDER — PROPOFOL 10 MG/ML
INJECTION, EMULSION INTRAVENOUS AS NEEDED
Status: DISCONTINUED | OUTPATIENT
Start: 2020-08-16 | End: 2020-08-16

## 2020-08-16 RX ORDER — HYDRALAZINE HYDROCHLORIDE 20 MG/ML
5 INJECTION INTRAMUSCULAR; INTRAVENOUS EVERY 6 HOURS PRN
Status: DISCONTINUED | OUTPATIENT
Start: 2020-08-16 | End: 2020-08-18 | Stop reason: HOSPADM

## 2020-08-16 RX ORDER — MAGNESIUM SULFATE HEPTAHYDRATE 40 MG/ML
2 INJECTION, SOLUTION INTRAVENOUS ONCE
Status: COMPLETED | OUTPATIENT
Start: 2020-08-16 | End: 2020-08-16

## 2020-08-16 RX ORDER — ACETAMINOPHEN 325 MG/1
975 TABLET ORAL EVERY 8 HOURS SCHEDULED
Status: DISCONTINUED | OUTPATIENT
Start: 2020-08-16 | End: 2020-08-18 | Stop reason: HOSPADM

## 2020-08-16 RX ORDER — SODIUM CHLORIDE, SODIUM LACTATE, POTASSIUM CHLORIDE, CALCIUM CHLORIDE 600; 310; 30; 20 MG/100ML; MG/100ML; MG/100ML; MG/100ML
INJECTION, SOLUTION INTRAVENOUS CONTINUOUS PRN
Status: DISCONTINUED | OUTPATIENT
Start: 2020-08-16 | End: 2020-08-16

## 2020-08-16 RX ORDER — KETOROLAC TROMETHAMINE 30 MG/ML
15 INJECTION, SOLUTION INTRAMUSCULAR; INTRAVENOUS EVERY 6 HOURS PRN
Status: DISPENSED | OUTPATIENT
Start: 2020-08-16 | End: 2020-08-18

## 2020-08-16 RX ORDER — SUCCINYLCHOLINE/SOD CL,ISO/PF 100 MG/5ML
SYRINGE (ML) INTRAVENOUS AS NEEDED
Status: DISCONTINUED | OUTPATIENT
Start: 2020-08-16 | End: 2020-08-16

## 2020-08-16 RX ORDER — BUPIVACAINE HYDROCHLORIDE AND EPINEPHRINE 5; 5 MG/ML; UG/ML
INJECTION, SOLUTION EPIDURAL; INTRACAUDAL; PERINEURAL AS NEEDED
Status: DISCONTINUED | OUTPATIENT
Start: 2020-08-16 | End: 2020-08-16 | Stop reason: HOSPADM

## 2020-08-16 RX ORDER — LABETALOL 20 MG/4 ML (5 MG/ML) INTRAVENOUS SYRINGE
5
Status: COMPLETED | OUTPATIENT
Start: 2020-08-16 | End: 2020-08-16

## 2020-08-16 RX ORDER — INSULIN GLARGINE 100 [IU]/ML
12 INJECTION, SOLUTION SUBCUTANEOUS
Status: DISCONTINUED | OUTPATIENT
Start: 2020-08-16 | End: 2020-08-18 | Stop reason: HOSPADM

## 2020-08-16 RX ORDER — CLINDAMYCIN PHOSPHATE 600 MG/50ML
600 INJECTION INTRAVENOUS EVERY 8 HOURS
Status: DISCONTINUED | OUTPATIENT
Start: 2020-08-16 | End: 2020-08-18 | Stop reason: HOSPADM

## 2020-08-16 RX ORDER — LIDOCAINE HYDROCHLORIDE AND EPINEPHRINE 10; 10 MG/ML; UG/ML
INJECTION, SOLUTION INFILTRATION; PERINEURAL AS NEEDED
Status: DISCONTINUED | OUTPATIENT
Start: 2020-08-16 | End: 2020-08-16 | Stop reason: HOSPADM

## 2020-08-16 RX ADMIN — POTASSIUM CHLORIDE 20 MEQ: 14.9 INJECTION, SOLUTION INTRAVENOUS at 06:19

## 2020-08-16 RX ADMIN — MAGNESIUM SULFATE HEPTAHYDRATE 2 G: 40 INJECTION, SOLUTION INTRAVENOUS at 06:47

## 2020-08-16 RX ADMIN — SODIUM CHLORIDE AND POTASSIUM CHLORIDE 125 ML/HR: .9; .15 SOLUTION INTRAVENOUS at 23:43

## 2020-08-16 RX ADMIN — KETOROLAC TROMETHAMINE 15 MG: 30 INJECTION, SOLUTION INTRAMUSCULAR at 06:53

## 2020-08-16 RX ADMIN — Medication 100 MG: at 13:10

## 2020-08-16 RX ADMIN — PHENYLEPHRINE HYDROCHLORIDE 100 MCG: 10 INJECTION INTRAVENOUS at 13:16

## 2020-08-16 RX ADMIN — ACETAMINOPHEN 975 MG: 325 TABLET, FILM COATED ORAL at 06:06

## 2020-08-16 RX ADMIN — PHENYLEPHRINE HYDROCHLORIDE 100 MCG: 10 INJECTION INTRAVENOUS at 13:13

## 2020-08-16 RX ADMIN — ACETAMINOPHEN 975 MG: 325 TABLET, FILM COATED ORAL at 00:59

## 2020-08-16 RX ADMIN — ENOXAPARIN SODIUM 40 MG: 40 INJECTION SUBCUTANEOUS at 09:41

## 2020-08-16 RX ADMIN — KETOROLAC TROMETHAMINE 15 MG: 30 INJECTION, SOLUTION INTRAMUSCULAR at 01:00

## 2020-08-16 RX ADMIN — LIDOCAINE HYDROCHLORIDE 50 MG: 10 INJECTION, SOLUTION EPIDURAL; INFILTRATION; INTRACAUDAL; PERINEURAL at 13:10

## 2020-08-16 RX ADMIN — KETOROLAC TROMETHAMINE 15 MG: 30 INJECTION, SOLUTION INTRAMUSCULAR at 16:06

## 2020-08-16 RX ADMIN — INSULIN GLARGINE 12 UNITS: 100 INJECTION, SOLUTION SUBCUTANEOUS at 21:55

## 2020-08-16 RX ADMIN — LABETALOL 20 MG/4 ML (5 MG/ML) INTRAVENOUS SYRINGE 5 MG: at 14:40

## 2020-08-16 RX ADMIN — SODIUM CHLORIDE AND POTASSIUM CHLORIDE 125 ML/HR: .9; .15 SOLUTION INTRAVENOUS at 09:34

## 2020-08-16 RX ADMIN — Medication 25 MCG: at 14:41

## 2020-08-16 RX ADMIN — SODIUM CHLORIDE AND POTASSIUM CHLORIDE 125 ML/HR: .9; .15 SOLUTION INTRAVENOUS at 01:16

## 2020-08-16 RX ADMIN — CLINDAMYCIN IN 5 PERCENT DEXTROSE 600 MG: 12 INJECTION, SOLUTION INTRAVENOUS at 18:52

## 2020-08-16 RX ADMIN — SODIUM CHLORIDE AND POTASSIUM CHLORIDE 125 ML/HR: .9; .15 SOLUTION INTRAVENOUS at 16:01

## 2020-08-16 RX ADMIN — SODIUM CHLORIDE, SODIUM LACTATE, POTASSIUM CHLORIDE, AND CALCIUM CHLORIDE: .6; .31; .03; .02 INJECTION, SOLUTION INTRAVENOUS at 13:03

## 2020-08-16 RX ADMIN — PROPOFOL 200 MG: 10 INJECTION, EMULSION INTRAVENOUS at 13:10

## 2020-08-16 RX ADMIN — AMPICILLIN SODIUM 2 MG: 1 INJECTION, POWDER, FOR SOLUTION INTRAMUSCULAR; INTRAVENOUS at 13:18

## 2020-08-16 RX ADMIN — Medication 25 MCG: at 14:25

## 2020-08-16 RX ADMIN — KETOROLAC TROMETHAMINE 15 MG: 30 INJECTION, SOLUTION INTRAMUSCULAR at 21:55

## 2020-08-16 RX ADMIN — Medication 25 MCG: at 14:35

## 2020-08-16 RX ADMIN — LABETALOL 20 MG/4 ML (5 MG/ML) INTRAVENOUS SYRINGE 5 MG: at 14:47

## 2020-08-16 RX ADMIN — ONDANSETRON 4 MG: 2 INJECTION INTRAMUSCULAR; INTRAVENOUS at 13:27

## 2020-08-16 RX ADMIN — LISINOPRIL: 20 TABLET ORAL at 09:40

## 2020-08-16 RX ADMIN — MIDAZOLAM 2 MG: 1 INJECTION INTRAMUSCULAR; INTRAVENOUS at 13:04

## 2020-08-16 RX ADMIN — FENTANYL CITRATE 50 MCG: 50 INJECTION, SOLUTION INTRAMUSCULAR; INTRAVENOUS at 13:10

## 2020-08-16 RX ADMIN — CLINDAMYCIN IN 5 PERCENT DEXTROSE 600 MG: 12 INJECTION, SOLUTION INTRAVENOUS at 11:21

## 2020-08-16 RX ADMIN — Medication 25 MCG: at 14:32

## 2020-08-16 RX ADMIN — POTASSIUM CHLORIDE 40 MEQ: 1500 TABLET, EXTENDED RELEASE ORAL at 06:06

## 2020-08-16 RX ADMIN — POTASSIUM CHLORIDE 20 MEQ: 14.9 INJECTION, SOLUTION INTRAVENOUS at 09:29

## 2020-08-16 RX ADMIN — CLINDAMYCIN IN 5 PERCENT DEXTROSE 600 MG: 12 INJECTION, SOLUTION INTRAVENOUS at 03:17

## 2020-08-16 RX ADMIN — INSULIN GLARGINE 12 UNITS: 100 INJECTION, SOLUTION SUBCUTANEOUS at 00:59

## 2020-08-16 RX ADMIN — ACETAMINOPHEN 975 MG: 325 TABLET, FILM COATED ORAL at 21:54

## 2020-08-16 NOTE — EMTALA/ACUTE CARE TRANSFER
[facility]  [address] [facility] [address]  Dept: [phone]      EMTALA TRANSFER CONSENT    NAME Constantin Hitchcock                                         1970                              MRN [id number]    I have been informed of my rights regarding examination, treatment, and transfer   by Dr Issac Almonte MD    Benefits: Continuity of care    Risks: Potential for delay in receiving treatment      Consent for Transfer:  I acknowledge that my medical condition has been evaluated and explained to me by the emergency department physician or other qualified medical person and/or my attending physician, who has recommended that I be transferred to the service of  Accepting Physician: Dr Carlos Martínez at [address] [facility] Name, [address] : [address] [place], [address]  The above potential benefits of such transfer, the potential risks associated with such transfer, and the probable risks of not being transferred have been explained to me, and I fully understand them  The doctor has explained that, in my case, the benefits of transfer outweigh the risks  I agree to be transferred  I authorize the performance of emergency medical procedures and treatments upon me in both transit and upon arrival at the receiving facility  Additionally, I authorize the release of any and all medical records to the receiving facility and request they be transported with me, if possible  I understand that the safest mode of transportation during a medical emergency is an ambulance and that the Hospital advocates the use of this mode of transport  Risks of traveling to the receiving facility by car, including absence of medical control, life sustaining equipment, such as oxygen, and medical personnel has been explained to me and I fully understand them  (MARCUS CORRECT BOX BELOW)  [  ]  I consent to the stated transfer and to be transported by ambulance/helicopter    [  ]  I consent to the stated transfer, but refuse transportation by ambulance and accept full responsibility for my transportation by car  I understand the risks of non-ambulance transfers and I exonerate the Hospital and its staff from any deterioration in my condition that results from this refusal     X___________________________________________    DATE  08/15/20  TIME________  Signature of patient or legally responsible individual signing on patient behalf           RELATIONSHIP TO PATIENT_________________________          Provider Certification    NAME Makenna Alfred                                         1970                              MRN [id number]    A medical screening exam was performed on the above named patient  Based on the examination:    Condition Necessitating Transfer The primary encounter diagnosis was Facial cellulitis  A diagnosis of Facial abscess was also pertinent to this visit  Patient Condition: The patient has been stabilized such that within reasonable medical probability, no material deterioration of the patient condition or the condition of the unborn child(stephen) is likely to result from the transfer    Reason for Transfer: Level of Care needed not available at this facility    Transfer Requirements: [address], [facility]   · Space available and qualified personnel available for treatment as acknowledged by Ms Dina Espinoza  · Agreed to accept transfer and to provide appropriate medical treatment as acknowledged by       Dr Mandeep Yates  · Appropriate medical records of the examination and treatment of the patient are provided at the time of transfer   [address], [address] _______  · Transfer will be performed by qualified personnel from    and appropriate transfer equipment as required, including the use of necessary and appropriate life support measures      Provider Certification: I have examined the patient and explained the following risks and benefits of being transferred/refusing transfer to the patient/family:  General risk, such as traffic hazards, adverse weather conditions, rough terrain or turbulence, possible failure of equipment (including vehicle or aircraft), or consequences of actions of persons outside the control of the transport personnel      Based on these reasonable risks and benefits to the patient and/or the unborn child(stephen), and based upon the information available at the time of the patients examination, I certify that the medical benefits reasonably to be expected from the provision of appropriate medical treatments at another medical facility outweigh the increasing risks, if any, to the individuals medical condition, and in the case of labor to the unborn child, from effecting the transfer      X____________________________________________ DATE 08/15/20        TIME_______      ORIGINAL - SEND TO MEDICAL RECORDS   COPY - SEND WITH PATIENT DURING TRANSFER

## 2020-08-16 NOTE — ASSESSMENT & PLAN NOTE
BP not well controlled  SBP ranging 182-217  BP not compliant with PTA meds  In ED received Hydralazine 5mg IV x1    - Continue PTA Lisinopril/HCTZ 20/12 5 qDaily   - Hydralazine 05mg q6hr PRN for SBP > 180    - Notify MD if SBP > 180

## 2020-08-16 NOTE — ASSESSMENT & PLAN NOTE
CKD stable  BUN 8, Cr  0 86, eGFR 101     - Most recent K 3 4)   - Trending BMP daily  repleted K; Pt on K-dur 40 meq BID oral    Lab Results   Component Value Date    SODIUM 141 08/17/2020    K 3 4 (L) 08/17/2020     08/17/2020    CO2 28 08/17/2020    BUN 8 08/17/2020    CREATININE 0 86 08/17/2020    GLUC 209 (H) 08/17/2020    CALCIUM 8 0 (L) 08/17/2020

## 2020-08-16 NOTE — ANESTHESIA POSTPROCEDURE EVALUATION
Post-Op Assessment Note    CV Status:  Stable  Pain Score: 0    Pain management: adequate     Mental Status:  Alert and awake   Hydration Status:  Euvolemic   PONV Controlled:  Controlled   Airway Patency:  Patent      Post Op Vitals Reviewed: Yes      Staff: CRNA         No complications documented      BP  143/66   Temp   97   Pulse  92   Resp   16   SpO2   100 room air

## 2020-08-16 NOTE — ASSESSMENT & PLAN NOTE
Swelling and cellulitis of Right face  CT (+)elizabeth for Right facial abscess  WBC 14 16   In ED received Clindamycin 600mg IV x1, Toradol 30mg IV x1    - F/u BCX (collected 08/15/20 18:37)   - Clindamycin 600mg IV q8hr   - Tylenol 975mg q8hr   - Toradol 15mg IV q6hr PRN   - OMF surgery consulted

## 2020-08-16 NOTE — UTILIZATION REVIEW
Initial Clinical Review    Admission: Date/Time/Statement:   Admission Orders (From admission, onward)     Ordered        08/16/20 0042  Place in Observation  Once                   Orders Placed This Encounter   Procedures    Place in Observation     Standing Status:   Standing     Number of Occurrences:   1     Order Specific Question:   Admitting Physician     Answer: JUN WILCOX [[id number]]     Order Specific Question:   Level of Care     Answer:   Med Surg [16]     ED Arrival Information     Patient not seen in ED -- tx from [address] [place] [address] ED                     Chief complaint:  Swelling, pain R face    Assessment/Plan:  49 y/o male with PMhx of HTN, T2DM, ETOH abuse, A-fib, chornic pancreatitis, thrombocytopenia, CKD III, b/l hearing loss who initially presented to [address] [place] [address] ED with c/o R-sided facial swelling/pain start started ~1 wk ago  Had taken 1 ampicillin pill from a friend without relief, started taking advil  States he is compliant with his insulin but has not been taking his BP meds  CT showed R facial abscess deep to the R zygomaticus musculature  Blood cxs down  IV clindamycin, toradol and hydralazine for BPS of 217 given in ED and tx'd to [facility] for OMFS eval & further tx  Admit observation to M/S unit with facial cellulitis  Continue IV abx, c/u on blood cxs  Tylenol otc and toradol prn for pain  Npo after MN for possible OR tomorrow  OMFS consulted  Continue po BP meds  Hydralazine IV prn  Fingerstick glucose checks ac & hs w/ ssi  Tele monitoring,  Recheck BMP for K of 3 2 on initial presentation  OMFS consult 8/16 -- plan for OR for I&D and extractions of necessary teeth  Pt instructed physician that under no circumstances will he allow front teeth to be taken out  OMFS stating infection from the front tooth needs to be addressed and is important to remove  Pt will not allow even after physician education      OPERATIVE REPORT  SURGERY DATE: 8/16/2020   Procedures:  Surgical extraction teeth# 2 & 3  Intraoral incision and drainage right  space  Anesthesia Type:   General  Operative Findings:  Bony dehiscence and mobility of teeth numbers 2 and 3        ED Triage Vitals   Temperature Pulse Respirations Blood Pressure SpO2   08/15/20 2344 08/15/20 2344 08/16/20 0939 08/15/20 2344 08/15/20 2344   99 7 °F (37 6 °C) 80 18 (!) 186/88 99 %      Temp Source Heart Rate Source Patient Position - Orthostatic VS BP Location FiO2 (%)   08/15/20 2344 08/15/20 2344 08/15/20 2344 08/15/20 2344 --   Oral Monitor Lying Right arm       Pain Score       08/15/20 2344       7          Wt Readings from Last 1 Encounters:   08/16/20 64 3 kg (141 lb 12 8 oz)     Additional Vital Signs:   Date/Time   Temp   Pulse   Resp   BP   MAP (mmHg)   SpO2   O2 Device   Patient Position - Orthostatic VS    08/16/20 0939   98 2 °F (36 8 °C)   67   18   166/77   --   98 %   --   Lying    08/16/20 0238   99 1 °F (37 3 °C)   72   --   160/77   111   --   --   Lying    08/15/20 2351   --   --   --   --   --   99 %   None (Room air)   --    08/15/20 2344   99 7 °F (37 6 °C)   80   --   186/88Abnormal     127   99 %   None (Room air)   Lying        Pertinent Labs/Diagnostic Test Results:   CT facial bones 8/15 -- 1   Right facial abscess measuring 2 2 x 1 3 x 1 9 cm, deep to the right zygomaticus musculature and in direct continuity with the buccal aspect of the right maxillary ridge, probably subperiosteal in location at the level of the 3rd tooth where there is    discontinuity of the lateral cortex of the maxillary ridge and where there may be some continuity with some chronic maxillary sinus mucosal disease   There is overlying moderate facial cellulitis and swelling present as well         Results from last 7 days   Lab Units 08/16/20  0431 08/15/20  1837   WBC Thousand/uL 14 10* 14 16*   HEMOGLOBIN g/dL 12 5 14 9   HEMATOCRIT % 33 9* 41 6   PLATELETS Thousands/uL 214 246 NEUTROS ABS Thousands/µL 9 76* 10 46*     Results from last 7 days   Lab Units 08/16/20  0431 08/15/20  1837   SODIUM mmol/L 141 137   POTASSIUM mmol/L 2 6* 3 2*   CHLORIDE mmol/L 110* 99*   CO2 mmol/L 25 28   ANION GAP mmol/L 6 10   BUN mg/dL 10 8   CREATININE mg/dL 0 73 1 02   EGFR ml/min/1 73sq m 108 85   CALCIUM mg/dL 7 7* 9 1   MAGNESIUM mg/dL 1 8  --      Results from last 7 days   Lab Units 08/15/20  1837   AST U/L 12   ALT U/L 22   ALK PHOS U/L 83   TOTAL PROTEIN g/dL 8 3*   ALBUMIN g/dL 3 6   TOTAL BILIRUBIN mg/dL 0 73     Results from last 7 days   Lab Units 08/16/20  0708   POC GLUCOSE mg/dl 148*     Results from last 7 days   Lab Units 08/16/20  0431 08/15/20  1837   GLUCOSE RANDOM mg/dL 189* 380*         Results from last 7 days   Lab Units 08/14/20  1122   HEMOGLOBIN A1C  9 3*     BETA-HYDROXYBUTYRATE   Date Value Ref Range Status   02/02/2019 0 16 0 02 - 0 27 mmol/L Final        Results from last 7 days   Lab Units 08/15/20  1837   BLOOD CULTURE  Received in Microbiology Lab  Culture in Progress  Received in Microbiology Lab  Culture in Progress  Past Medical History:   Diagnosis Date    Alcohol abuse     Chronic pancreatitis ([facility] [address] )     Diabetes mellitus (HCC)     Type 2    Pancreatitis     Paroxysmal A-fib (HCC)     Thrombocytopenia (HCC)      Present on Admission:   Essential hypertension   Paroxysmal A-fib (HCC)   CKD (chronic kidney disease), stage III (HCC)      Admitting Diagnosis: Facial cellulitis [L03 211]  Age/Sex: 48 y o  male  Admission Orders:  Scheduled Medications:  acetaminophen, 975 mg, Oral, Q8H JAISON  clindamycin, 600 mg, Intravenous, Q8H  enoxaparin, 40 mg, Subcutaneous, Daily  insulin glargine, 12 Units, Subcutaneous, HS  insulin lispro, 1-5 Units, Subcutaneous, Q6H  lisinopril-hydrochlorothiazide (PRINZIDE 20/12  5) combo dose, , Oral, Daily  potassium chloride, 20 mEq, Intravenous, Q2H      Continuous IV Infusions:  sodium chloride 0 9 % with KCl 20 mEq/L, 125 mL/hr, Intravenous, Continuous      PRN Meds:  hydrALAZINE, 5 mg, Intravenous, Q6H PRN  ketorolac, 15 mg, Intravenous, Q6H PRN  8/16 x2        IP CONSULT TO ORAL AND MAXILLOFACIAL SURGERY    Network Utilization Review Department  [email or web address]  ATTENTION: Please call with any questions or concerns to [phone] and carefully listen to the prompts so that you are directed to the right person  All voicemails are confidential   [place] Doing all requests for admission clinical reviews, approved or denied determinations and any other requests to dedicated fax number below belonging to the campus where the patient is receiving treatment   List of dedicated fax numbers for the Facilities:  [address] [place] [address] DENIALS (Administrative/Medical Necessity) [phone]   [address] (Maternity/NICU/Pediatrics) [phone]   Renee Handing [phone]   Allyne Just [phone]   [facility] [place] [phone]   [address]  [phone]   [address] [place] [address] [phone]   [facility]  [phone]   [address] [facility] [address]  [address] [facility] [address] [facility] [phone]

## 2020-08-16 NOTE — ASSESSMENT & PLAN NOTE
Swelling and cellulitis of Right face  S/P Incision and drainage on 08/16/20: of Rt  space and Tooth extraction #2 &3  CT (+)elizabeth on 08/15 for Right facial abscess  WBC trending down 13 38  Clindamycin 600mg IV q8hrsd  - Tylenol 975mg q8hr  - Toradol 15mg IV q6hr PRN     In ED received Clindamycin 600mg IV x1, Toradol 30mg IV x1   -Bcx- no growth at 24 hrs

## 2020-08-16 NOTE — OP NOTE
OPERATIVE REPORT  PATIENT NAME: Rg Sanchez    :  1970  MRN: [id number]  Pt Location: BE OR ROOM 07    SURGERY DATE: 2020    Surgeon(s) and Role:     * Toña Rodriguez DMD - Primary     Sharron Mayo    Preop Diagnosis:  Facial cellulitis [M38 283]  Facial abscess [L02 01]    Post-Op Diagnosis Codes:     * Facial cellulitis [L03 211]     * Facial abscess [L02 01]      Procedures:  Surgical extraction teeth# 2 & 3  Intraoral incision and drainage right  space    Specimen(s):  ID Type Source Tests Collected by Time Destination   A : oral abcess Body Fluid Mouth ANAEROBIC CULTURE AND GRAM STAIN, BODY FLUID CULTURE AND GRAM STAIN Toña Rodriguez DMD 2020 1322        Estimated Blood Loss:   Minimal    Drains:  No drains placed    Anesthesia Type:   General    Operative Indications:  Facial cellulitis [O59 138]  Facial abscess [L02 01]      Operative Findings:  Bony dehiscence and mobility of teeth numbers 2 and 3    Complications:   None    Procedure and Technique:  The patient was greeted in the preoperative area  All the risks and benefits of the procedure were once again explained and the risks of sinus communication as well as lower chin and lip numbness were explained in detail all questions were answered  Consent had already been signed  Care was then handed back to the anesthesia team     The patient was brought into the operating room by the anesthesia team and the patient was placed in a supine position where the patient remained for the rest of the case  Anesthesia was able to establish an orotracheal intubation without any complications  Care was then handed back to the OMFS team     Patient was draped in sterile manner timeout was performed in which the patient was correctly identified by name medical record number as well as a site of the procedure be performed  Patient had received preoperative IV ampicillin 2 g Antibiotics   Once a timeout was completed oral cavity was thoroughly suctioned with the [employer] suction the moist vaginal packing was used it as a throat pack  Patient was given local anesthesia at the sites of the extractions with 1% lidocaine with 1-100,000 epinephrine as local anesthesia per anesthesia record  Patient was also given approximately half percent Marcaine with 1-200,000 epinephrine also at the sites per anesthesia record  Fifteen blade to make an incision in the right maxillary vestibule  Blunt dissection was performed to the right  space  Purulent drainage was noted  Aerobic and anaerobic cultures were taken  A periosteal elevator was used to separate the gingiva from the teeth  Full thickness mucoperiosteal flaps elevated at all extraction sites  Periosteal elevator to remove minimal crestal bone  There was a large bony dehiscence associated with teeth numbers 2 and 3  I suspected that these were the teeth responsible for the facial infection  Universal forceps were used to extract teeth #2 & 3 without any complications  Under normal circumstances I would remove tooth #1  However the patient was adamant preoperative that he only wanted maximum of two  teeth removed  This was well documented consultation and against my medical advice  However based on the patient's very very poor disposition and general attitude I was uncomfortable taking more than the two teeth  Surgical sites were thoroughly curetted, bone filed, and irrigated with sterile saline  All surgical sites were reevaluated found to be hemostatic  Next the oral cavity was thoroughly irrigated with sterile saline and suctioned with the [employer] suction  The moist vaginal packing was removed and the oropharynx was suctioned  Based on the removal of the source of the infection in the significant purulent drainage obtained a drain was not placed  I felt that adding a drain would add a foreign body in potentially complicate the patient's postoperative care      Care was then handed back to anesthesia team where the patient was extubated in the operating room without any complications and then transferred to the postanesthesia care unit       I was present for the entire procedure    Patient Disposition:  PACU , hemodynamically stable and extubated and stable    SIGNATURE: Charisma Hodgson DMD  DATE: August 16, 2020  TIME: 1:31 PM

## 2020-08-16 NOTE — ANESTHESIA PREPROCEDURE EVALUATION
Procedure:  INCISION AND DRAINAGE (I&D) HEAD/FACE (N/A Face)  EXTRACTION TEETH MULTIPLE (N/A Mouth)    Relevant Problems   ANESTHESIA (within normal limits)      CARDIO   (+) Essential hypertension   (+) Paroxysmal A-fib (HCC)      ENDO   (+) Type 2 diabetes mellitus with hyperglycemia, with long-term current use of insulin (HCC)      GI/HEPATIC   (+) Chronic pancreatitis (HCC)      /RENAL   (+) CKD (chronic kidney disease), stage III (HCC)      HEMATOLOGY   (+) Anemia   (+) Thrombocytopenia (HCC)      NEURO/PSYCH   (+) History of atrial fibrillation      Other   (+) Uncomplicated alcohol dependence (HCC)      Lab Results   Component Value Date    WBC 14 10 (H) 08/16/2020    HGB 12 5 08/16/2020    HCT 33 9 (L) 08/16/2020    MCV 87 08/16/2020     08/16/2020     Lab Results   Component Value Date     04/11/2018    K 2 6 (LL) 08/16/2020    CO2 25 08/16/2020     (H) 08/16/2020    BUN 10 08/16/2020    CREATININE 0 73 08/16/2020     Lab Results   Component Value Date    INR 1 12 02/07/2019    INR 1 01 02/02/2019    INR 1 20 (H) 09/14/2015    PROTIME 14 5 (H) 02/07/2019    PROTIME 13 4 02/02/2019    PROTIME 15 1 (H) 09/14/2015     Lab Results   Component Value Date    PTT 31 02/02/2019       Lab Results   Component Value Date    GLUCOSE 242 (H) 04/11/2018    GLUCOSE 157 (H) 09/14/2015    GLUCOSE 127 09/14/2015       Lab Results   Component Value Date    HGBA1C 9 3 (A) 08/14/2020       Type and Screen:  A    Physical Exam    Airway  Comment: Severe swelling of right face/mouth/lip  Adequate mouth opening, limited by pain  Mallampati score: II  TM Distance: >3 FB  Neck ROM: full     Dental       Cardiovascular  Rhythm: regular, Rate: normal,     Pulmonary      Other Findings        Anesthesia Plan  ASA Score- 3     Anesthesia Type- general with ASA Monitors  Additional Monitors:   Airway Plan: ETT  Plan Factors-Exercise tolerance (METS): >4 METS  Chart reviewed   Imaging results reviewed  Existing labs reviewed  Patient summary reviewed              Induction-     Postoperative Plan-     Informed Consent-

## 2020-08-16 NOTE — ASSESSMENT & PLAN NOTE
T2DM not well controlled  HgbA1C 9 3 (8/14/20),  on admission  Patient taking Lantus 25unit at home  - Lantus 12unit qHS   - Insulin sliding scale   - NPO for possible surgery tomorrow  Lab Results   Component Value Date    HGBA1C 9 3 (A) 08/14/2020       No results for input(s): POCGLU in the last 72 hours      Blood Sugar Average: Last 72 hrs:

## 2020-08-16 NOTE — ASSESSMENT & PLAN NOTE
BP not well controlled likely due to non-compliance to PTA meds  SBP now ranging in 140-170, DBP: 70-80  Most recent BP this am : 163/77   - Continue PTA Lisinopril/HCTZ 20/12 5mg qDaily  -In ED received Hydralazine 05mg x1     - Hydralazine 05mg q6hr PRN for SBP>160   - Notify MD if SBP>160

## 2020-08-16 NOTE — CONSULTS
Oral and Maxillofacial Surgery Consult    Pt seen 08/16/20 12:08 PM     Assessment  48 y o  male  evaluated for right facial swelling 2/2 odontogenic source  Plan for Incision and drainage of right facial abscess, extraction of necessary teeth - limited to only one or two teeth, pt declines extractions of anterior dentition as "it is not worth being a man without my teeth " Pt is not allowing us to practice to standard of care  Pt is aware of all risks/benefits/indications/contraindications/alternatives to treatment  Plan:  - Please medically optimize pt for treatment, and give any recommendations/modifications for I&D, extractions under general anesthesia  - Analgesia as per primary team, rec nsaids prn pain as you are doing  - rec Rx abx x7d total course (unasyn 3g IV q6h then transition to augmentin 875-125mg BID PO when able), if patient is pen allergic, recommend clinda 600 mg q8h as you are doing   - NPO/IVF for OR  - Encourage good oral hygiene  - DVT ppx: SCD, OOB; please hold pharmacologic AC for the OR, can start post-op  - HOB  - Yankaur suction at bedside    D/w OMFS attg on call    Consults     HPI: 48 y o  male w PMH HTN,  T2DM EtOH abus, afib, pancreatitis chronic, ckd3, b/l healing loss presented for right sided facial swelling, transferred from OSH for surgical management  Pt apparently took PO abx as outpatient but states the pain has been increasing over the course of 1 week  CT scan shows moderately sized abscess of right maxilla adjacent to tooth #3 UR molar  Pt complains of dental pain in the area of upper right maxilla, describes it as sharp pain  Pain 10/10  Pt does not receive regular dental care  Denies fever, chills, nausea, odynophagia, dysphagia, dyspnea, shortness of breath       PMH:   Past Medical History:   Diagnosis Date    Alcohol abuse     Chronic pancreatitis ([facility] [address] )     Diabetes mellitus (HCC)     Type 2    Pancreatitis     Paroxysmal A-fib (HCC)     Thrombocytopenia ([facility] )         Allergies:   No Known Allergies    Meds:     Current Facility-Administered Medications:     acetaminophen (TYLENOL) tablet 975 mg, 975 mg, Oral, Q8H [address], Melchor Wakefield MD, 975 mg at 08/16/20 0606    clindamycin (CLEOCIN) IVPB (premix) 600 mg 50 mL, 600 mg, Intravenous, Q8H, Melchor Wakefield MD, Last Rate: 100 mL/hr at 08/16/20 1121, 600 mg at 08/16/20 1121    enoxaparin (LOVENOX) subcutaneous injection 40 mg, 40 mg, Subcutaneous, Daily, Melchor Wakefield MD, 40 mg at 08/16/20 0941    hydrALAZINE (APRESOLINE) injection 5 mg, 5 mg, Intravenous, Q6H PRN, Melchor Wakefield MD    insulin glargine (LANTUS) subcutaneous injection 12 Units 0 12 mL, 12 Units, Subcutaneous, HS, Melchor Wakefield MD, 12 Units at 08/16/20 0059    insulin lispro (HumaLOG) 100 units/mL subcutaneous injection 1-5 Units, 1-5 Units, Subcutaneous, Q6H **AND** Fingerstick Glucose (POCT), , , Q6H, Helen Whitfield DO    ketorolac (TORADOL) injection 15 mg, 15 mg, Intravenous, Q6H PRN, Melchor Wakefield MD, 15 mg at 08/16/20 0653    lisinopril-hydrochlorothiazide (PRINZIDE 20/12  5) combo dose, , Oral, Daily, Melchor Wakefield MD    sodium chloride 0 9 % with KCl 20 mEq/L infusion (premix), 125 mL/hr, Intravenous, Continuous, Melchor Wakefield MD, Last Rate: 125 mL/hr at 08/16/20 0934, 125 mL/hr at 08/16/20 0934    PSH:   Past Surgical History:   Procedure Laterality Date    IR BONE MARROW BIOPSY/ASPIRATION  2/7/2019      Family History   Problem Relation Age of Onset    Diabetes Mother     Hypertension Mother     Hyperlipidemia Father         Review of Systems   Constitutional: Negative  HENT: Positive for dental problem and facial swelling  Eyes: Negative  Respiratory: Negative  Cardiovascular: Negative  Gastrointestinal: Negative  Endocrine: Negative  Genitourinary: Negative  Musculoskeletal: Negative  Allergic/Immunologic: Negative  Neurological: Negative  Hematological: Negative      Psychiatric/Behavioral: Negative  Temp:  [98 2 °F (36 8 °C)-99 7 °F (37 6 °C)] 98 2 °F (36 8 °C)  HR:  [67-93] 67  Resp:  [16-18] 18  BP: (160-217)/() 166/77  SpO2:  [97 %-100 %] 98 %       Intake/Output Summary (Last 24 hours) at 8/16/2020 1208  Last data filed at 8/16/2020 0705  Gross per 24 hour   Intake 1000 ml   Output 50 ml   Net 950 ml        Physical Exam:  GE: AAOx3  NAD  HEENT:    Head: right facial swelling  No trismus  Moderate tenderness to palpation right buccal/canine space  No LAD  Inferior border of the mandible is palpable  Mouth: ~40mm HUMAIRA  Dentition full, faire, right maxillary buccal vestibular swelling  No purulence  Nosinus tract  FOM soft/non-tender/non-elevated  Uvula midline  #3 +++ percussion  Erupted #1  Resp: no respiratory distress on RA  CVS: RRR    Lab Results: I have personally reviewed pertinent lab results  Imaging: I have personally reviewed pertinent reports  EKG, Pathology, and Other Studies: I have personally reviewed pertinent reports

## 2020-08-16 NOTE — H&P
[facility]  Wes Araujo 48 y o  male MRN: [id number]  Unit/Bed#: -01 Encounter: [id number]      Assessment/Plan     Facial cellulitis  Swelling and cellulitis of Right face  CT (+)elizabeth for Right facial abscess  WBC 14 16  In ED received Clindamycin 600mg IV x1, Toradol 30mg IV x1    - F/u BCX (collected 08/15/20 18:37)   - Clindamycin 600mg IV q8hr   - Tylenol 975mg q8hr   - Toradol 15mg IV q6hr PRN   - OMF surgery consulted   - NPO for possible surgery tomorrow  Essential hypertension  BP not well controlled  SBP ranging 182-217  BP not compliant with PTA meds  In ED received Hydralazine 5mg IV x1    - Continue PTA Lisinopril/HCTZ 20/12 5 qDaily   - Hydralazine 05mg q6hr PRN for SBP > 180    - Notify MD if SBP > 180  Type 2 diabetes mellitus with hyperglycemia, with long-term current use of insulin (HCC)  T2DM not well controlled  HgbA1C 9 3 (8/14/20),  on admission  Patient taking Lantus 25unit at home  - Lantus 12unit qHS   - Insulin sliding scale    Paroxysmal A-fib (HCC)  PAFib stable  Auscultation regular rate and rhythm  - Telemetry    CKD (chronic kidney disease), stage III (HCC)  CKD stable  BUN 8, Cr  1 02, eGFR 85    - Mild hypokalemia (K 3 2)   - Trending BMP daily       Diet:        Diet Orders   (From admission, onward)             Start     Ordered    08/16/20 0029  Diet NPO; Sips with meds  Diet effective now     Question Answer Comment   Diet Type NPO    NPO Except: Sips with meds    RD to adjust diet per protocol? Yes        08/16/20 0042              DVT Prophylaxis: VTE Pharmacologic Prophylaxis: Enoxaparin (Lovenox)  VTE Mechanical Prophylaxis: sequential compression device  Code Status:  Full-code  Disposition: Discussed with DR Vladimir Christiansen (attending) and [facility] team    - Admission for observation and f/u with OMF consult           SUBJECTIVE  HPI:  48 y o  male w/ PMHx significant for HTN,  T2DM, EtOH abuse, paroxysmal AFib, chronic pancreatitis, thrombocytopenia, CKD 3, B/L hearing loss, who presented for right-sided facial swelling/pain  Patient states last Sunday he began to experience pain and swelling on the Right side of his face, taken 1 Ampicillin pill from a friend without relief, and switched to Advil PRN  Patient states that he is compliant with his insulin but have not taking his BP medication  Patient is otherwise in normal state of health, denies fever, N/V/D, or other systemic symptoms  ED Management:   - [address] (collected 08/15/20 18:37)   - CT: Right facial abscess deep to the right zygomaticus musculature    - Clindamycin 600 mg IV once   - Toradol 30 mg IV once   - hydralazine 5 mg IV once       Review of Systems   Constitutional: Negative for activity change, appetite change and fever  HENT: Positive for dental problem and facial swelling (Right-sided facial swelling)  Negative for ear discharge, ear pain, rhinorrhea, sore throat, tinnitus and voice change  Eyes: Negative for pain, discharge, redness and visual disturbance  Respiratory: Negative for apnea, cough, choking, chest tightness, shortness of breath, wheezing and stridor  Cardiovascular: Negative for chest pain and palpitations  Gastrointestinal: Negative for abdominal distention, abdominal pain, constipation, diarrhea, nausea and vomiting  Genitourinary: Negative for dysuria  Musculoskeletal: Negative for arthralgias, neck pain and neck stiffness  Neurological: Negative for tremors, seizures and weakness  Psychiatric/Behavioral: Negative for agitation, behavioral problems and confusion         Historical Information   Past Medical History:   Diagnosis Date    Alcohol abuse     Chronic pancreatitis ([facility] )     Diabetes mellitus (HCC)     Type 2    Pancreatitis     Paroxysmal A-fib (HCC)     Thrombocytopenia (HCC)      Past Surgical History:   Procedure Laterality Date    IR BONE MARROW BIOPSY/ASPIRATION  2/7/2019     Social History   Social History     Substance and Sexual Activity   Alcohol Use Yes    Frequency: 2-3 times a week    Drinks per session: 3 or 4    Binge frequency: Weekly     Social History     Substance and Sexual Activity   Drug Use Yes    Types: Cocaine     Social History     Tobacco Use   Smoking Status Former Smoker   Smokeless Tobacco Never Used     Family History: non-contributory    Meds/Allergies   all medications and allergies reviewed  No Known Allergies    OBJECTIVE  Vitals: Blood pressure 160/77, pulse 72, temperature 99 1 °F (37 3 °C), temperature source Oral, SpO2 99 %  Intake/Output Summary (Last 24 hours) at 8/16/2020 0334  Last data filed at 8/16/2020 0116  Gross per 24 hour   Intake 1000 ml   Output --   Net 1000 ml       Invasive Devices     Peripheral Intravenous Line            Peripheral IV 03/22/20 Left Antecubital 147 days    Peripheral IV 08/15/20 Left Hand less than 1 day    Peripheral IV 08/15/20 Right Antecubital less than 1 day                Physical Exam  Constitutional:       General: He is not in acute distress  Appearance: Normal appearance  He is well-developed  He is not ill-appearing, toxic-appearing or diaphoretic  HENT:      Head: Normocephalic and atraumatic  Jaw: Tenderness and swelling present  Right Ear: Tympanic membrane, ear canal and external ear normal  There is no impacted cerumen  Left Ear: Tympanic membrane, ear canal and external ear normal  There is no impacted cerumen  Nose: Nose normal  No congestion or rhinorrhea  Mouth/Throat:      Mouth: Mucous membranes are moist       Pharynx: Oropharynx is clear  No oropharyngeal exudate or posterior oropharyngeal erythema  Eyes:      General: No scleral icterus  Right eye: No discharge  Left eye: No discharge  Conjunctiva/sclera: Conjunctivae normal       Pupils: Pupils are equal, round, and reactive to light  Neck:      Musculoskeletal: Normal range of motion and neck supple   No neck rigidity  Cardiovascular:      Rate and Rhythm: Normal rate and regular rhythm  Pulses: Normal pulses  Heart sounds: Normal heart sounds  No murmur  No gallop  Pulmonary:      Effort: Pulmonary effort is normal  No respiratory distress  Breath sounds: Normal breath sounds  No stridor  No wheezing or rales  Abdominal:      General: Bowel sounds are normal  There is no distension  Palpations: Abdomen is soft  There is no mass  Tenderness: There is no abdominal tenderness  There is no guarding or rebound  Hernia: No hernia is present  Genitourinary:     Penis: Normal     Musculoskeletal: Normal range of motion  General: No swelling, deformity or signs of injury  Right lower leg: No edema  Left lower leg: No edema  Skin:     General: Skin is warm  Coloration: Skin is not jaundiced  Neurological:      General: No focal deficit present  Mental Status: He is alert and oriented to person, place, and time  Psychiatric:         Behavior: Behavior normal          Thought Content: Thought content normal          Judgment: Judgment normal            Lab Results:   I have personally reviewed pertinent reports  Imaging:   I have personally reviewed pertinent reports  EKG, Pathology, and Other Studies:   I have personally reviewed pertinent reports  Code Status: Level 1 - Full Code    The senior resident saw and examined the patient and DR Deyvi Devine agreed with the plan      MD Ray Earl U  2  PGY-1  8/16/2020  3:34 AM

## 2020-08-16 NOTE — ASSESSMENT & PLAN NOTE
T2DM poorly controlled  HgbA1C 9 3 (8/14/20)  - Lantus 12unit daily   -On  Insulin sliding scale   this am  PTA Lantus 25unit daily      Lab Results   Component Value Date    HGBA1C 9 3 (A) 08/14/2020

## 2020-08-17 LAB
ANION GAP SERPL CALCULATED.3IONS-SCNC: 5 MMOL/L (ref 4–13)
ATRIAL RATE: 66 BPM
BASOPHILS # BLD AUTO: 0.02 THOUSANDS/ΜL (ref 0–0.1)
BASOPHILS NFR BLD AUTO: 0 % (ref 0–1)
BUN SERPL-MCNC: 8 MG/DL (ref 5–25)
CALCIUM SERPL-MCNC: 8 MG/DL (ref 8.3–10.1)
CHLORIDE SERPL-SCNC: 108 MMOL/L (ref 100–108)
CO2 SERPL-SCNC: 28 MMOL/L (ref 21–32)
CREAT SERPL-MCNC: 0.86 MG/DL (ref 0.6–1.3)
EOSINOPHIL # BLD AUTO: 0.22 THOUSAND/ΜL (ref 0–0.61)
EOSINOPHIL NFR BLD AUTO: 2 % (ref 0–6)
ERYTHROCYTE [DISTWIDTH] IN BLOOD BY AUTOMATED COUNT: 11.7 % (ref 11.6–15.1)
GFR SERPL CREATININE-BSD FRML MDRD: 101 ML/MIN/1.73SQ M
GLUCOSE SERPL-MCNC: 116 MG/DL (ref 65–140)
GLUCOSE SERPL-MCNC: 187 MG/DL (ref 65–140)
GLUCOSE SERPL-MCNC: 209 MG/DL (ref 65–140)
GLUCOSE SERPL-MCNC: 211 MG/DL (ref 65–140)
GLUCOSE SERPL-MCNC: 215 MG/DL (ref 65–140)
HCT VFR BLD AUTO: 34.6 % (ref 36.5–49.3)
HGB BLD-MCNC: 12.1 G/DL (ref 12–17)
IMM GRANULOCYTES # BLD AUTO: 0.07 THOUSAND/UL (ref 0–0.2)
IMM GRANULOCYTES NFR BLD AUTO: 1 % (ref 0–2)
LEFT EYE DIABETIC RETINOPATHY: NORMAL
LEFT EYE IMAGE QUALITY: NORMAL
LEFT EYE MACULAR EDEMA: NORMAL
LEFT EYE OTHER RETINOPATHY: NORMAL
LYMPHOCYTES # BLD AUTO: 2.23 THOUSANDS/ΜL (ref 0.6–4.47)
LYMPHOCYTES NFR BLD AUTO: 17 % (ref 14–44)
MCH RBC QN AUTO: 31.3 PG (ref 26.8–34.3)
MCHC RBC AUTO-ENTMCNC: 35 G/DL (ref 31.4–37.4)
MCV RBC AUTO: 90 FL (ref 82–98)
MONOCYTES # BLD AUTO: 1.28 THOUSAND/ΜL (ref 0.17–1.22)
MONOCYTES NFR BLD AUTO: 10 % (ref 4–12)
NEUTROPHILS # BLD AUTO: 9.56 THOUSANDS/ΜL (ref 1.85–7.62)
NEUTS SEG NFR BLD AUTO: 70 % (ref 43–75)
NRBC BLD AUTO-RTO: 0 /100 WBCS
P AXIS: 40 DEGREES
PLATELET # BLD AUTO: 211 THOUSANDS/UL (ref 149–390)
PMV BLD AUTO: 11 FL (ref 8.9–12.7)
POTASSIUM SERPL-SCNC: 3.4 MMOL/L (ref 3.5–5.3)
PR INTERVAL: 136 MS
QRS AXIS: 29 DEGREES
QRSD INTERVAL: 94 MS
QT INTERVAL: 430 MS
QTC INTERVAL: 450 MS
RBC # BLD AUTO: 3.86 MILLION/UL (ref 3.88–5.62)
RIGHT EYE DIABETIC RETINOPATHY: NORMAL
RIGHT EYE IMAGE QUALITY: NORMAL
RIGHT EYE MACULAR EDEMA: NORMAL
RIGHT EYE OTHER RETINOPATHY: NORMAL
SEVERITY (EYE EXAM): NORMAL
SODIUM SERPL-SCNC: 141 MMOL/L (ref 136–145)
T WAVE AXIS: 1 DEGREES
VENTRICULAR RATE: 66 BPM
WBC # BLD AUTO: 13.38 THOUSAND/UL (ref 4.31–10.16)

## 2020-08-17 PROCEDURE — 93010 ELECTROCARDIOGRAM REPORT: CPT | Performed by: INTERNAL MEDICINE

## 2020-08-17 PROCEDURE — 80048 BASIC METABOLIC PNL TOTAL CA: CPT | Performed by: FAMILY MEDICINE

## 2020-08-17 PROCEDURE — 2025F 7 FLD RTA PHOTO W/O RTNOPTHY: CPT | Performed by: PHYSICIAN ASSISTANT

## 2020-08-17 PROCEDURE — 82948 REAGENT STRIP/BLOOD GLUCOSE: CPT

## 2020-08-17 PROCEDURE — 99233 SBSQ HOSP IP/OBS HIGH 50: CPT | Performed by: FAMILY MEDICINE

## 2020-08-17 PROCEDURE — 85025 COMPLETE CBC W/AUTO DIFF WBC: CPT | Performed by: FAMILY MEDICINE

## 2020-08-17 RX ORDER — POTASSIUM CHLORIDE 20 MEQ/1
40 TABLET, EXTENDED RELEASE ORAL 2 TIMES DAILY
Status: DISCONTINUED | OUTPATIENT
Start: 2020-08-17 | End: 2020-08-18 | Stop reason: HOSPADM

## 2020-08-17 RX ADMIN — INSULIN LISPRO 2 UNITS: 100 INJECTION, SOLUTION INTRAVENOUS; SUBCUTANEOUS at 17:12

## 2020-08-17 RX ADMIN — POTASSIUM CHLORIDE 40 MEQ: 1500 TABLET, EXTENDED RELEASE ORAL at 09:54

## 2020-08-17 RX ADMIN — LISINOPRIL: 20 TABLET ORAL at 09:51

## 2020-08-17 RX ADMIN — ACETAMINOPHEN 975 MG: 325 TABLET, FILM COATED ORAL at 13:19

## 2020-08-17 RX ADMIN — INSULIN GLARGINE 12 UNITS: 100 INJECTION, SOLUTION SUBCUTANEOUS at 21:59

## 2020-08-17 RX ADMIN — ENOXAPARIN SODIUM 40 MG: 40 INJECTION SUBCUTANEOUS at 09:54

## 2020-08-17 RX ADMIN — INSULIN LISPRO 1 UNITS: 100 INJECTION, SOLUTION INTRAVENOUS; SUBCUTANEOUS at 12:03

## 2020-08-17 RX ADMIN — ACETAMINOPHEN 975 MG: 325 TABLET, FILM COATED ORAL at 05:25

## 2020-08-17 RX ADMIN — CLINDAMYCIN IN 5 PERCENT DEXTROSE 600 MG: 12 INJECTION, SOLUTION INTRAVENOUS at 12:04

## 2020-08-17 RX ADMIN — ACETAMINOPHEN 975 MG: 325 TABLET, FILM COATED ORAL at 21:59

## 2020-08-17 RX ADMIN — CLINDAMYCIN IN 5 PERCENT DEXTROSE 600 MG: 12 INJECTION, SOLUTION INTRAVENOUS at 19:39

## 2020-08-17 RX ADMIN — INSULIN LISPRO 2 UNITS: 100 INJECTION, SOLUTION INTRAVENOUS; SUBCUTANEOUS at 07:14

## 2020-08-17 RX ADMIN — CLINDAMYCIN IN 5 PERCENT DEXTROSE 600 MG: 12 INJECTION, SOLUTION INTRAVENOUS at 02:05

## 2020-08-17 RX ADMIN — POTASSIUM CHLORIDE 40 MEQ: 1500 TABLET, EXTENDED RELEASE ORAL at 17:12

## 2020-08-17 NOTE — PROGRESS NOTES
Progress Note Agustín Campbell 1970, 48 y o  male MRN: [id number]    Unit/Bed#: -01 Encounter: [id number]    Primary Care Provider: Anthony Cleary PA-C   Date and time admitted to hospital: 8/15/2020 11:29 PM        * Facial cellulitis  Assessment & Plan  Swelling and cellulitis of Right face  S/P Incision and drainage on 08/16/20: of Rt  space and Tooth extraction #2 &3  CT (+)elizabeth on 08/15 for Right facial abscess  WBC trending down 13 38  Clindamycin 600mg IV q8hrsd  - Tylenol 975mg q8hr  - Toradol 15mg IV q6hr PRN    In ED received Clindamycin 600mg IV x1, Toradol 30mg IV x1   -Bcx- no growth at 24 hrs    CKD (chronic kidney disease), stage III ([facility])  Assessment & Plan  CKD stable  BUN 8, Cr  0 86, eGFR 101  - Most recent K 3 4)   - Trending BMP daily  repleted K; Pt on K-dur 40 meq BID oral    Lab Results   Component Value Date    SODIUM 141 08/17/2020    K 3 4 (L) 08/17/2020     08/17/2020    CO2 28 08/17/2020    BUN 8 08/17/2020    CREATININE 0 86 08/17/2020    GLUC 209 (H) 08/17/2020    CALCIUM 8 0 (L) 08/17/2020         Paroxysmal A-fib ([facility])  Assessment & Plan  PAFib stable  Auscultation regular rate and rhythm  - On Telemetry    Type 2 diabetes mellitus with hyperglycemia, with long-term current use of insulin ([facility])  Assessment & Plan  T2DM poorly controlled  HgbA1C 9 3 (8/14/20)  - Lantus 12unit daily   -On  Insulin sliding scale   this am  PTA Lantus 25unit daily  Lab Results   Component Value Date    HGBA1C 9 3 (A) 08/14/2020         Essential hypertension  Assessment & Plan  BP not well controlled likely due to non-compliance to PTA meds  SBP now ranging in 140-170, DBP: 70-80  Most recent BP this am : 163/77   - Continue PTA Lisinopril/HCTZ 20/12 5mg qDaily  -In ED received Hydralazine 05mg x1     - Hydralazine 05mg q6hr PRN for SBP>160   - Notify MD if SBP>160      PPX: Lovenox  Diet: Soft  Code Status: Level 1 full code  Dispo: Observation    Plan D/W Dr Meza and [facility] Team    Subjective:   Pt seen and examined at bedside today  Pt says he is feeling fine today  Pt denies facial pain, chest pain, SOB, nausea, vomiting, cough  Pt endorses some difficulty chewing food  Pt has no other concerns  Review of Systems   Constitutional: Negative for activity change, appetite change, chills and fever  HENT: Positive for facial swelling (Mild Rt sided: S/P I &D, improving)  Negative for congestion and sore throat  Respiratory: Negative for cough and shortness of breath  Cardiovascular: Negative for chest pain  Gastrointestinal: Negative for abdominal pain, diarrhea, nausea and vomiting  Musculoskeletal: Negative for back pain and neck pain  Skin: Negative for rash  Neurological: Negative for dizziness, weakness and headaches  Psychiatric/Behavioral: Negative for agitation and behavioral problems  Objective:     Vitals: Blood pressure 163/77, pulse 81, temperature 99 9 °F (37 7 °C), temperature source Oral, resp  rate 18, weight 64 3 kg (141 lb 12 8 oz), SpO2 96 %  ,Body mass index is 23 6 kg/m²  Intake/Output Summary (Last 24 hours) at 8/17/2020 0914  Last data filed at 8/17/2020 0536  Gross per 24 hour   Intake 1535 42 ml   Output 1425 ml   Net 110 42 ml       Physical Exam:   Physical Exam  Vitals signs and nursing note reviewed  Constitutional:       Appearance: He is well-developed  HENT:      Head: Normocephalic and atraumatic  Eyes:      Conjunctiva/sclera: Conjunctivae normal    Neck:      Musculoskeletal: Normal range of motion  Cardiovascular:      Rate and Rhythm: Normal rate and regular rhythm  Heart sounds: Normal heart sounds  No murmur  Pulmonary:      Effort: Pulmonary effort is normal  No respiratory distress  Breath sounds: Normal breath sounds  Abdominal:      General: Abdomen is flat  Bowel sounds are normal  There is no distension  Palpations: Abdomen is soft  Tenderness: There is no abdominal tenderness  Skin:     General: Skin is warm and dry  Neurological:      Mental Status: He is alert and oriented to person, place, and time  Psychiatric:         Behavior: Behavior normal          Invasive Devices     Peripheral Intravenous Line            Peripheral IV 03/22/20 Left Antecubital 148 days    Peripheral IV 08/15/20 Left Hand 1 day    Peripheral IV 08/15/20 Right Antecubital 1 day                           Lab and other studies:  I have personally reviewed pertinent reports       Admission on 08/15/2020   Component Date Value    Platelets 41/81/6446 217     MPV 08/16/2020 10 9     WBC 08/16/2020 14 10*    RBC 08/16/2020 3 89     Hemoglobin 08/16/2020 12 5     Hematocrit 08/16/2020 33 9*    MCV 08/16/2020 87     MCH 08/16/2020 32 1     MCHC 08/16/2020 36 9     RDW 08/16/2020 11 6     MPV 08/16/2020 11 2     Platelets 22/48/0967 214     nRBC 08/16/2020 0     Neutrophils Relative 08/16/2020 70     Immat GRANS % 08/16/2020 0     Lymphocytes Relative 08/16/2020 19     Monocytes Relative 08/16/2020 9     Eosinophils Relative 08/16/2020 2     Basophils Relative 08/16/2020 0     Neutrophils Absolute 08/16/2020 9 76*    Immature Grans Absolute 08/16/2020 0 06     Lymphocytes Absolute 08/16/2020 2 71     Monocytes Absolute 08/16/2020 1 29*    Eosinophils Absolute 08/16/2020 0 25     Basophils Absolute 08/16/2020 0 03     Sodium 08/16/2020 141     Potassium 08/16/2020 2 6*    Chloride 08/16/2020 110*    CO2 08/16/2020 25     ANION GAP 08/16/2020 6     BUN 08/16/2020 10     Creatinine 08/16/2020 0 73     Glucose 08/16/2020 189*    Glucose, Fasting 08/16/2020 189*    Calcium 08/16/2020 7 7*    eGFR 08/16/2020 108     Magnesium 08/16/2020 1 8     POC Glucose 08/16/2020 148*    Potassium 08/16/2020 3 3*    SARS-CoV-2 08/16/2020 Negative     POC Glucose 08/16/2020 103     POC Glucose 08/16/2020 110     POC Glucose 08/16/2020 309*    Sodium 08/17/2020 141     Potassium 08/17/2020 3 4*    Chloride 08/17/2020 108     CO2 08/17/2020 28     ANION GAP 08/17/2020 5     BUN 08/17/2020 8     Creatinine 08/17/2020 0 86     Glucose 08/17/2020 209*    Calcium 08/17/2020 8 0*    eGFR 08/17/2020 101     WBC 08/17/2020 13 38*    RBC 08/17/2020 3 86*    Hemoglobin 08/17/2020 12 1     Hematocrit 08/17/2020 34 6*    MCV 08/17/2020 90     MCH 08/17/2020 31 3     MCHC 08/17/2020 35 0     RDW 08/17/2020 11 7     MPV 08/17/2020 11 0     Platelets 72/25/4900 211     nRBC 08/17/2020 0     Neutrophils Relative 08/17/2020 70     Immat GRANS % 08/17/2020 1     Lymphocytes Relative 08/17/2020 17     Monocytes Relative 08/17/2020 10     Eosinophils Relative 08/17/2020 2     Basophils Relative 08/17/2020 0     Neutrophils Absolute 08/17/2020 9 56*    Immature Grans Absolute 08/17/2020 0 07     Lymphocytes Absolute 08/17/2020 2 23     Monocytes Absolute 08/17/2020 1 28*    Eosinophils Absolute 08/17/2020 0 22     Basophils Absolute 08/17/2020 0 02     POC Glucose 08/17/2020 215*       Recent Results (from the past 24 hour(s))   [facility]    Collection Time: 08/16/20 11:28 AM    Specimen: Nose; Nares   Result Value Ref Range    SARS-CoV-2 Negative Negative   Fingerstick Glucose (POCT)    Collection Time: 08/16/20 12:05 PM   Result Value Ref Range    POC Glucose 103 65 - 140 mg/dl   Fingerstick Glucose (POCT)    Collection Time: 08/16/20  1:48 PM   Result Value Ref Range    POC Glucose 110 65 - 140 mg/dl   Platelet count    Collection Time: 08/16/20  2:09 PM   Result Value Ref Range    Platelets 048 631 - 878 Thousands/uL    MPV 10 9 8 9 - 12 7 fL   Potassium    Collection Time: 08/16/20  2:09 PM   Result Value Ref Range    Potassium 3 3 (L) 3 5 - 5 3 mmol/L   Fingerstick Glucose (POCT)    Collection Time: 08/16/20  9:51 PM   Result Value Ref Range    POC Glucose 309 (H) 65 - 140 mg/dl   Basic metabolic panel Collection Time: 08/17/20  5:31 AM   Result Value Ref Range    Sodium 141 136 - 145 mmol/L    Potassium 3 4 (L) 3 5 - 5 3 mmol/L    Chloride 108 100 - 108 mmol/L    CO2 28 21 - 32 mmol/L    ANION GAP 5 4 - 13 mmol/L    BUN 8 5 - 25 mg/dL    Creatinine 0 86 0 60 - 1 30 mg/dL    Glucose 209 (H) 65 - 140 mg/dL    Calcium 8 0 (L) 8 3 - 10 1 mg/dL    eGFR 101 ml/min/1 73sq m   CBC and differential    Collection Time: 08/17/20  5:31 AM   Result Value Ref Range    WBC 13 38 (H) 4 31 - 10 16 Thousand/uL    RBC 3 86 (L) 3 88 - 5 62 Million/uL    Hemoglobin 12 1 12 0 - 17 0 g/dL    Hematocrit 34 6 (L) 36 5 - 49 3 %    MCV 90 82 - 98 fL    MCH 31 3 26 8 - 34 3 pg    MCHC 35 0 31 4 - 37 4 g/dL    RDW 11 7 11 6 - 15 1 %    MPV 11 0 8 9 - 12 7 fL    Platelets 420 079 - 475 Thousands/uL    nRBC 0 /100 WBCs    Neutrophils Relative 70 43 - 75 %    Immat GRANS % 1 0 - 2 %    Lymphocytes Relative 17 14 - 44 %    Monocytes Relative 10 4 - 12 %    Eosinophils Relative 2 0 - 6 %    Basophils Relative 0 0 - 1 %    Neutrophils Absolute 9 56 (H) 1 85 - 7 62 Thousands/µL    Immature Grans Absolute 0 07 0 00 - 0 20 Thousand/uL    Lymphocytes Absolute 2 23 0 60 - 4 47 Thousands/µL    Monocytes Absolute 1 28 (H) 0 17 - 1 22 Thousand/µL    Eosinophils Absolute 0 22 0 00 - 0 61 Thousand/µL    Basophils Absolute 0 02 0 00 - 0 10 Thousands/µL   Fingerstick Glucose (POCT)    Collection Time: 08/17/20  6:56 AM   Result Value Ref Range    POC Glucose 215 (H) 65 - 140 mg/dl     Blood Culture:   Lab Results   Component Value Date    BLOODCX No Growth at 24 hrs  08/15/2020    BLOODCX No Growth at 24 hrs  08/15/2020   ,   Urinalysis:   Lab Results   Component Value Date    COLORU Yellow 02/02/2019    COLORU Yellow 09/14/2015    CLARITYU Clear 02/02/2019    CLARITYU Clear 09/14/2015    SPECGRAV 1 010 02/02/2019    SPECGRAV 1 010 09/14/2015    PHUR 5 0 02/02/2019    PHUR >=9 0 09/14/2015    LEUKOCYTESUR (A) 02/02/2019     Elevated glucose may cause decreased leukocyte values  See urine microscopic for [facility] result/    LEUKOCYTESUR Negative 09/14/2015    NITRITE Negative 02/02/2019    NITRITE Negative 09/14/2015    PROTEINUA >=300(3+) (A) 09/14/2015    GLUCOSEU >=1000 (1%) (A) 02/02/2019    GLUCOSEU Negative 09/14/2015    KETONESU Negative 02/02/2019    KETONESU Trace (A) 09/14/2015    BILIRUBINUR Negative 02/02/2019    BILIRUBINUR Negative 09/14/2015    BLOODU Trace-lysed (A) 02/02/2019    BLOODU Negative 09/14/2015   ,   Urine Culture: No results found for: URINECX,   Wound Culure: No results found for: WOUNDCULT      Imaging:  None       VTE Pharmacologic Prophylaxis: Enoxaparin (Lovenox)  VTE Mechanical Prophylaxis: sequential compression device    Current Facility-Administered Medications   Medication Dose Route Frequency    acetaminophen (TYLENOL) tablet 975 mg  975 mg Oral Q8H [address]    clindamycin (CLEOCIN) IVPB (premix) 600 mg 50 mL  600 mg Intravenous Q8H    enoxaparin (LOVENOX) subcutaneous injection 40 mg  40 mg Subcutaneous Daily    hydrALAZINE (APRESOLINE) injection 5 mg  5 mg Intravenous Q6H PRN    insulin glargine (LANTUS) subcutaneous injection 12 Units 0 12 mL  12 Units Subcutaneous HS    insulin lispro (HumaLOG) 100 units/mL subcutaneous injection 1-5 Units  1-5 Units Subcutaneous TID With Meals    ketorolac (TORADOL) injection 15 mg  15 mg Intravenous Q6H PRN    lactated ringers infusion  50 mL/hr Intravenous Continuous    lisinopril-hydrochlorothiazide (PRINZIDE 20/12  5) combo dose   Oral Daily    potassium chloride (K-DUR,KLOR-CON) CR tablet 40 mEq  40 mEq Oral BID       Katrin Regalado MD  Family Medicine Resident PGY1

## 2020-08-17 NOTE — UTILIZATION REVIEW
Continued Stay Review    Date: 8-17-20                  Current Patient Class: observation  8-16-20 0042 Current Level of Care: med surg  CHANGED TO INPATIENT  8-17-20 1142  FOR CONTINUED TREATMENT OF CELLULITIS    08/17/20 1142    Inpatient Admission  Once      Transfer Service: General Medicine       Question  Answer    Admitting Physician  [facility] [facility]    Level of Care  Med Surg    Estimated length of stay  More than 2 Midnights    Certification  I certify that inpatient services are medically necessary for this patient for a duration of greater than two midnights  See H&P and MD Progress Notes for additional information about the patient's course of treatment  HPI:50 y o  male initially admitted on 8-15      Assessment/Plan:     Pod 1  Incision and drainage of right  space and dental extraction of #2 and 3  Poorly controlled diabetic on insulin sliding scale  Blood glucose  215 this am   Wbc trending down  Blood culture no growth  Continue telemetry for afib  Facial swelling improving  Pain resolved  Advance diet   Continue iv antibiotics      Pertinent Labs/Diagnostic Results:   Results from last 7 days   Lab Units 08/16/20  1128   SARS-COV-2  Negative     Results from last 7 days   Lab Units 08/17/20  0531 08/16/20  1409 08/16/20  0431 08/15/20  1837   WBC Thousand/uL 13 38*  --  14 10* 14 16*   HEMOGLOBIN g/dL 12 1  --  12 5 14 9   HEMATOCRIT % 34 6*  --  33 9* 41 6   PLATELETS Thousands/uL 211 217 214 246   NEUTROS ABS Thousands/µL 9 56*  --  9 76* 10 46*         Results from last 7 days   Lab Units 08/17/20  0531 08/16/20  1409 08/16/20  0431 08/15/20  1837   SODIUM mmol/L 141  --  141 137   POTASSIUM mmol/L 3 4* 3 3* 2 6* 3 2*   CHLORIDE mmol/L 108  --  110* 99*   CO2 mmol/L 28  --  25 28   ANION GAP mmol/L 5  --  6 10   BUN mg/dL 8  --  10 8   CREATININE mg/dL 0 86  --  0 73 1 02   EGFR ml/min/1 73sq m 101  --  108 85   CALCIUM mg/dL 8 0*  --  7 7* 9 1   MAGNESIUM mg/dL  -- --  1 8  --      Results from last 7 days   Lab Units 08/15/20  1837   AST U/L 12   ALT U/L 22   ALK PHOS U/L 83   TOTAL PROTEIN g/dL 8 3*   ALBUMIN g/dL 3 6   TOTAL BILIRUBIN mg/dL 0 73     Results from last 7 days   Lab Units 08/17/20  1040 08/17/20  0656 08/16/20  2151 08/16/20  1348 08/16/20  1205 08/16/20  0708   POC GLUCOSE mg/dl 187* 215* 309* 110 103 148*     Results from last 7 days   Lab Units 08/17/20  0531 08/16/20  0431 08/15/20  1837   GLUCOSE RANDOM mg/dL 209* 189* 380*         Results from last 7 days   Lab Units 08/14/20  1122   HEMOGLOBIN A1C  9 3*     BETA-HYDROXYBUTYRATE   Date Value Ref Range Status   02/02/2019 0 16 0 02 - 0 27 mmol/L Final        Results from last 7 days   Lab Units 08/15/20  1837   BLOOD CULTURE  No Growth at 24 hrs  No Growth at 24 hrs  Vital Signs:    Vitals:    08/16/20 1500 08/16/20 1921 08/16/20 2152 08/17/20 0954   BP: (!) 191/91 142/70 163/77 143/74   BP Location:  Left arm Right arm Left arm   Pulse: 79 94 81 68   Resp: 18 18 18 18   Temp: 97 8 °F (36 6 °C)  99 9 °F (37 7 °C) 98 5 °F (36 9 °C)   TempSrc: Oral  Oral Oral   SpO2:   96% 98%   Weight:         Medications:   Scheduled Medications:  acetaminophen, 975 mg, Oral, Q8H JAISON  clindamycin, 600 mg, Intravenous, Q8H  enoxaparin, 40 mg, Subcutaneous, Daily  insulin glargine, 12 Units, Subcutaneous, HS  insulin lispro, 1-5 Units, Subcutaneous, TID With Meals  lisinopril-hydrochlorothiazide (PRINZIDE 20/12  5) combo dose, , Oral, Daily  potassium chloride, 40 mEq, Oral, BID      Continuous IV Infusions:  lactated ringers, 50 mL/hr, Intravenous, Continuous      PRN Meds:  hydrALAZINE, 5 mg, Intravenous, Q6H PRN  ketorolac, 15 mg, Intravenous, Q6H PRN        Discharge Plan: to be determined     Network Utilization Review Department  [email or web address]  ATTENTION: Please call with any questions or concerns to [phone] and carefully listen to the prompts so that you are directed to the right person  All voicemails are confidential   [facility] all requests for admission clinical reviews, approved or denied determinations and any other requests to dedicated fax number below belonging to the campus where the patient is receiving treatment   List of dedicated fax numbers for the Facilities:  [address] [place] [address] DENIALS (Administrative/Medical Necessity) [phone]   [address] (Maternity/NICU/Pediatrics) [phone]   [facility] [phone]     [address]  [phone]   [address] [place] [address] [phone]   [address] [place] [address]  [phone]   [address] [place] [address] [phone]   [facility]  [phone]   [address] [place] [address], [address]  [address] [facility] [address] [facility] [phone]

## 2020-08-17 NOTE — UTILIZATION REVIEW
Notification of Inpatient Admission/Inpatient Authorization Request   This is a Notification of Inpatient Admission for 5 Ramandeep Chuaace  Be advised that this patient was admitted to our facility under Inpatient Status  Contact Jayleen Trimble at [phone] for additional admission information  [facility] DEPT  DEDICATED [phone]  Patient Name:   Angel Rosales   YOB: 1970       [address]   [address]:   [address] [address]  Tax ID: [id number]  NPI: [id number] Attending Provider/NPI:  Phone:  Address: Manju George,  [[id number]]  [phone]  Same as BRIDGET/Lola Jackson 1106 of Service Code: 24 Place of Service Name:  [address] [place] [address]   Start Date: 8/15/20 2329 Discharge Date & Time: No discharge date for patient encounter  Type of Admission: Inpatient Status Discharge Disposition (if discharged): [address] [place] [address]   Patient Diagnoses: Facial cellulitis [S97 550]     Orders: Admission Orders (From admission, onward)     Ordered        08/17/20 1142  Inpatient Admission  Once         08/16/20 0042  Place in Observation  Once                    Assigned Utilization Review Contact: Jayleen Trimble  Utilization ,   Network Utilization Review Department  Phone: [phone]; Fax [phone]   Email: [email or web address]   ATTENTION PAYERS: Please call the assigned Utilization  directly with any questions or concerns ALL voicemails in the department are confidential  Send all requests for admission clinical reviews, approved or denied determinations and any other requests to dedicated fax number belonging to the campus where the patient is receiving treatment    Initial Clinical Review    Admission: Date/Time/Statement:   Admission Orders (From admission, onward)     Ordered        08/17/20 1142  Inpatient Admission  Once         08/16/20 0042  Place in Observation Once                   Orders Placed This Encounter   Procedures    Place in Observation     Standing Status:   Standing     Number of Occurrences:   1     Order Specific Question:   Admitting Physician     Answer: Beatriz Morgan [[id number]]     Order Specific Question:   Level of Care     Answer:   Med Surg [16]    Inpatient Admission     Standing Status:   Standing     Number of Occurrences:   1     Order Specific Question:   Admitting Physician     Answer: Denia Aponte [[id number]]     Order Specific Question:   Level of Care     Answer:   Med Surg [16]     Order Specific Question:   Estimated length of stay     Answer:   More than 2 Midnights     Order Specific Question:   Certification     Answer:   I certify that inpatient services are medically necessary for this patient for a duration of greater than two midnights  See H&P and MD Progress Notes for additional information about the patient's course of treatment  ED Arrival Information     Patient not seen in ED -- tx from [address] [place] [address] ED                     Chief complaint:  Swelling, pain R face    Assessment/Plan:  47 y/o male with PMhx of HTN, T2DM, ETOH abuse, A-fib, chornic pancreatitis, thrombocytopenia, CKD III, b/l hearing loss who initially presented to [address] [place] [address] ED with c/o R-sided facial swelling/pain start started ~1 wk ago  Had taken 1 ampicillin pill from a friend without relief, started taking advil  States he is compliant with his insulin but has not been taking his BP meds  CT showed R facial abscess deep to the R zygomaticus musculature  Blood cxs down  IV clindamycin, toradol and hydralazine for BPS of 217 given in ED and tx'd to [facility] for OMFS eval & further tx  Admit observation to M/S unit with facial cellulitis  Continue IV abx, c/u on blood cxs  Tylenol otc and toradol prn for pain  Npo after MN for possible OR tomorrow  OMFS consulted  Continue po BP meds  Hydralazine IV prn   Fingerstick glucose checks ac & hs w/ ssi  Tele monitoring,  Recheck BMP for K of 3 2 on initial presentation  OMFS consult 8/16 -- plan for OR for I&D and extractions of necessary teeth  Pt instructed physician that under no circumstances will he allow front teeth to be taken out  OMFS stating infection from the front tooth needs to be addressed and is important to remove  Pt will not allow even after physician education      OPERATIVE REPORT  SURGERY DATE: 8/16/2020   Procedures:  Surgical extraction teeth# 2 & 3  Intraoral incision and drainage right  space  Anesthesia Type:   General  Operative Findings:  Bony dehiscence and mobility of teeth numbers 2 and 3        ED Triage Vitals   Temperature Pulse Respirations Blood Pressure SpO2   08/15/20 2344 08/15/20 2344 08/16/20 0939 08/15/20 2344 08/15/20 2344   99 7 °F (37 6 °C) 80 18 (!) 186/88 99 %      Temp Source Heart Rate Source Patient Position - Orthostatic VS BP Location FiO2 (%)   08/15/20 2344 08/15/20 2344 08/15/20 2344 08/15/20 2344 --   Oral Monitor Lying Right arm       Pain Score       08/15/20 2344       7          Wt Readings from Last 1 Encounters:   08/16/20 64 3 kg (141 lb 12 8 oz)     Additional Vital Signs:   Date/Time   Temp   Pulse   Resp   BP   MAP (mmHg)   SpO2   O2 Device   Patient Position - Orthostatic VS    08/16/20 0939   98 2 °F (36 8 °C)   67   18   166/77   --   98 %   --   Lying    08/16/20 0238   99 1 °F (37 3 °C)   72   --   160/77   111   --   --   Lying    08/15/20 2351   --   --   --   --   --   99 %   None (Room air)   --    08/15/20 2344   99 7 °F (37 6 °C)   80   --   186/88Abnormal     127   99 %   None (Room air)   Lying        Pertinent Labs/Diagnostic Test Results:   CT facial bones 8/15 -- 1   Right facial abscess measuring 2 2 x 1 3 x 1 9 cm, deep to the right zygomaticus musculature and in direct continuity with the buccal aspect of the right maxillary ridge, probably subperiosteal in location at the level of the 3rd tooth where there is    discontinuity of the lateral cortex of the maxillary ridge and where there may be some continuity with some chronic maxillary sinus mucosal disease   There is overlying moderate facial cellulitis and swelling present as well  Results from last 7 days   Lab Units 08/16/20  1128   SARS-COV-2  Negative     Results from last 7 days   Lab Units 08/17/20  0531 08/16/20  1409 08/16/20  0431 08/15/20  1837   WBC Thousand/uL 13 38*  --  14 10* 14 16*   HEMOGLOBIN g/dL 12 1  --  12 5 14 9   HEMATOCRIT % 34 6*  --  33 9* 41 6   PLATELETS Thousands/uL 211 217 214 246   NEUTROS ABS Thousands/µL 9 56*  --  9 76* 10 46*     Results from last 7 days   Lab Units 08/17/20  0531 08/16/20  1409 08/16/20  0431 08/15/20  1837   SODIUM mmol/L 141  --  141 137   POTASSIUM mmol/L 3 4* 3 3* 2 6* 3 2*   CHLORIDE mmol/L 108  --  110* 99*   CO2 mmol/L 28  --  25 28   ANION GAP mmol/L 5  --  6 10   BUN mg/dL 8  --  10 8   CREATININE mg/dL 0 86  --  0 73 1 02   EGFR ml/min/1 73sq m 101  --  108 85   CALCIUM mg/dL 8 0*  --  7 7* 9 1   MAGNESIUM mg/dL  --   --  1 8  --      Results from last 7 days   Lab Units 08/15/20  1837   AST U/L 12   ALT U/L 22   ALK PHOS U/L 83   TOTAL PROTEIN g/dL 8 3*   ALBUMIN g/dL 3 6   TOTAL BILIRUBIN mg/dL 0 73     Results from last 7 days   Lab Units 08/17/20  1040 08/17/20  0656 08/16/20  2151 08/16/20  1348 08/16/20  1205 08/16/20  0708   POC GLUCOSE mg/dl 187* 215* 309* 110 103 148*     Results from last 7 days   Lab Units 08/17/20  0531 08/16/20  0431 08/15/20  1837   GLUCOSE RANDOM mg/dL 209* 189* 380*         Results from last 7 days   Lab Units 08/14/20  1122   HEMOGLOBIN A1C  9 3*     BETA-HYDROXYBUTYRATE   Date Value Ref Range Status   02/02/2019 0 16 0 02 - 0 27 mmol/L Final        Results from last 7 days   Lab Units 08/16/20  1322 08/15/20  1837   BLOOD CULTURE   --  No Growth at 24 hrs  No Growth at 24 hrs     GRAM STAIN RESULT  No Polys or Bacteria seen  --    BODY FLUID CULTURE, STERILE  Culture too young- will reincubate  --        Past Medical History:   Diagnosis Date    Alcohol abuse     Chronic pancreatitis ([facility] )     Diabetes mellitus (HCC)     Type 2    Pancreatitis     Paroxysmal A-fib (HCC)     Thrombocytopenia (HCC)      Present on Admission:   Essential hypertension   Paroxysmal A-fib (HCC)   CKD (chronic kidney disease), stage III (HCC)      Admitting Diagnosis: Facial cellulitis [L03 [id number]]  Age/Sex: 48 y o  male  Admission Orders:  Scheduled Medications:  acetaminophen, 975 mg, Oral, Q8H JAISON  clindamycin, 600 mg, Intravenous, Q8H  enoxaparin, 40 mg, Subcutaneous, Daily  insulin glargine, 12 Units, Subcutaneous, HS  insulin lispro, 1-5 Units, Subcutaneous, Q6H  lisinopril-hydrochlorothiazide (PRINZIDE 20/12  5) combo dose, , Oral, Daily  potassium chloride, 20 mEq, Intravenous, Q2H      Continuous IV Infusions:  sodium chloride 0 9 % with KCl 20 mEq/L, 125 mL/hr, Intravenous, Continuous      PRN Meds:  hydrALAZINE, 5 mg, Intravenous, Q6H PRN  ketorolac, 15 mg, Intravenous, Q6H PRN  8/16 x2        IP CONSULT TO ORAL AND MAXILLOFACIAL SURGERY    Network Utilization Review Department  [email or web address]  ATTENTION: Please call with any questions or concerns to [phone] and carefully listen to the prompts so that you are directed to the right person  All voicemails are confidential   Lisa Solis all requests for admission clinical reviews, approved or denied determinations and any other requests to dedicated fax number below belonging to the campus where the patient is receiving treatment   List of dedicated fax numbers for the Facilities:  FACILITY NAME UR FAX NUMBER   ADMISSION DENIALS (Administrative/Medical Necessity) [phone]   [address] (Maternity/NICU/Pediatrics) [phone]   [facility] [address] [facility] Jarod Madrigal [phone]   [address] [facility] 1525  [phone]   Christos [facility] [address] [phone]   [address] [facility], [facility]  [phone] [address] [facility] [address] [facility] [phone]

## 2020-08-17 NOTE — DISCHARGE SUMMARY
Discharge Summary - Charise Nissen 48 y o  male MRN: [id number]    Unit/Bed#: -01 Encounter: [id number]    Admission Date:   Admission Orders (From admission, onward)     Ordered        08/17/20 1142  Inpatient Admission  Once         08/16/20 0042  Place in Observation  Once                     Admitting Diagnosis: Facial cellulitis [[id number]]    HPI:   As per Dr Kaela Cloud H&P on 08/16/2020:  "48 y o  male w/ PMHx significant for HTN,  T2DM, EtOH abuse, paroxysmal AFib, chronic pancreatitis, thrombocytopenia, CKD 3, B/L hearing loss, who presented for right-sided facial swelling/pain  Patient states last Sunday he began to experience pain and swelling on the Right side of his face, taken 1 Ampicillin pill from a friend without relief, and switched to Advil PRN  Patient states that he is compliant with his insulin but have not taking his BP medication  Patient is otherwise in normal state of health, denies fever, N/V/D, or other systemic symptoms    ED Management:   - [address] (collected 08/15/20 18:37)   - CT: Right facial abscess deep to the right zygomaticus musculature    - Clindamycin 600 mg IV once   - Toradol 30 mg IV once   - hydralazine 5 mg IV once "    Procedures Performed: No orders of the defined types were placed in this encounter  Summary of Hospital Course:   Amber Araujo 48 y o male with PMHx significant for HTN, T2DM, Etoh abuse, Paroxysmal Afib, CKD-stage 3, b/l hearing loss presented to the Ed with rt sided facial swelling and pain  Pt is non compliant with his meds at home  Pt was otherwise asymptomatic on admission  In the Ed BCx drawn, CT face showed Right facial abscess deep to zygomaticus  Pt received 1 dose of clindamycin 600mg IV in ED and hydralazine once for elevated blood pressure on admission  Incision and drainage of rt  space was done on 08/16 by OMFS along with extraction of teeth #2,3  Pt was put on clindamycin 600mg Iv q8  K repleted   Pt was on PTA Lantus 25, 12 here along with SSI  Over the course of stay, WBC trended down, BP controlled, BCX neg over 24 hrs  Pt transitioned to oral antibiotics: AugmentinX5 on the day of discharge and percocet and naproxen for pain management  Pt tolerating oral feeds well  No facial pain  Pt is hemodynamically stable  Significant Findings, Care, Treatment and Services Provided:   I&D rt  space and tooth extraction #2, 3 by OMFS  CT-face: 08/15: Right facial abscess deep to zygomaticus    Complications: NONE    Discharge Diagnosis:   Patient Active Problem List   Diagnosis    Essential hypertension    Type 2 diabetes mellitus with hyperglycemia, with long-term current use of insulin ([facility] [address] )    Uncomplicated alcohol dependence ([facility] [address] )    Alcohol intoxication in active alcoholic with complication [facility]    Paroxysmal A-fib (HCC)    Chronic pancreatitis ([facility] )    Thrombocytopenia (HCC)    History of atrial fibrillation    Anemia    CKD (chronic kidney disease), stage III ([facility] )    Hypoglycemia    Hypokalemia    Bilateral hearing loss         Resolved Problems  Date Reviewed: 8/14/2020          Resolved    * (Principal) Facial cellulitis 8/18/2020     Resolved by  Janes Crowder MD        Review of Systems   Constitutional: Negative for chills and fever  HENT: Negative for congestion and sore throat  Respiratory: Negative for cough, shortness of breath and wheezing  Cardiovascular: Negative for chest pain  Gastrointestinal: Negative for abdominal pain, nausea and vomiting  Musculoskeletal: Negative for back pain and neck pain  Skin: Negative for rash  Neurological: Negative for dizziness, weakness and headaches  Psychiatric/Behavioral: Negative for agitation and behavioral problems  Physical Exam  Vitals signs reviewed  Constitutional:       Appearance: He is well-developed  HENT:      Head: Normocephalic and atraumatic        Mouth/Throat:      Comments: Healthy granulation tissue on teeth extraction sites rt sided #2,3  Eyes:      Conjunctiva/sclera: Conjunctivae normal    Neck:      Musculoskeletal: Normal range of motion  Cardiovascular:      Rate and Rhythm: Normal rate and regular rhythm  Heart sounds: Normal heart sounds  No murmur  Pulmonary:      Effort: Pulmonary effort is normal  No respiratory distress  Breath sounds: Normal breath sounds  Skin:     General: Skin is warm and dry  Neurological:      Mental Status: He is alert and oriented to person, place, and time  Psychiatric:         Behavior: Behavior normal        Condition at Discharge: stable         Discharge instructions/Information to patient and family:   See after visit summary for information provided to patient and family  Provisions for Follow-Up Care:  See after visit summary for information related to follow-up care and any pertinent home health orders  PCP: Anthony Cleary PA-C    Disposition: Home    Planned Readmission: No      Discharge Statement   I spent 30  minutes discharging the patient  This time was spent on the day of discharge  I had direct contact with the patient on the day of discharge  Additional documentation is required if more than 30 minutes were spent on discharge  Discharge Medications:  See after visit summary for reconciled discharge medications provided to patient and family

## 2020-08-18 VITALS
OXYGEN SATURATION: 98 % | WEIGHT: 141.8 LBS | HEART RATE: 60 BPM | TEMPERATURE: 98.4 F | SYSTOLIC BLOOD PRESSURE: 178 MMHG | BODY MASS INDEX: 23.63 KG/M2 | RESPIRATION RATE: 16 BRPM | DIASTOLIC BLOOD PRESSURE: 89 MMHG | HEIGHT: 65 IN

## 2020-08-18 PROBLEM — L03.211 FACIAL CELLULITIS: Status: RESOLVED | Noted: 2020-08-16 | Resolved: 2020-08-18

## 2020-08-18 LAB
ANION GAP SERPL CALCULATED.3IONS-SCNC: 4 MMOL/L (ref 4–13)
BACTERIA SPEC ANAEROBE CULT: NORMAL
BACTERIA SPEC BFLD CULT: NORMAL
BASOPHILS # BLD AUTO: 0.04 THOUSANDS/ΜL (ref 0–0.1)
BASOPHILS NFR BLD AUTO: 0 % (ref 0–1)
BUN SERPL-MCNC: 7 MG/DL (ref 5–25)
CALCIUM SERPL-MCNC: 8.5 MG/DL (ref 8.3–10.1)
CHLORIDE SERPL-SCNC: 109 MMOL/L (ref 100–108)
CO2 SERPL-SCNC: 27 MMOL/L (ref 21–32)
CREAT SERPL-MCNC: 0.77 MG/DL (ref 0.6–1.3)
EOSINOPHIL # BLD AUTO: 0.56 THOUSAND/ΜL (ref 0–0.61)
EOSINOPHIL NFR BLD AUTO: 6 % (ref 0–6)
ERYTHROCYTE [DISTWIDTH] IN BLOOD BY AUTOMATED COUNT: 11.5 % (ref 11.6–15.1)
GFR SERPL CREATININE-BSD FRML MDRD: 106 ML/MIN/1.73SQ M
GLUCOSE SERPL-MCNC: 119 MG/DL (ref 65–140)
GLUCOSE SERPL-MCNC: 119 MG/DL (ref 65–140)
GLUCOSE SERPL-MCNC: 129 MG/DL (ref 65–140)
GRAM STN SPEC: NORMAL
HCT VFR BLD AUTO: 37.7 % (ref 36.5–49.3)
HGB BLD-MCNC: 12.8 G/DL (ref 12–17)
IMM GRANULOCYTES # BLD AUTO: 0.06 THOUSAND/UL (ref 0–0.2)
IMM GRANULOCYTES NFR BLD AUTO: 1 % (ref 0–2)
LYMPHOCYTES # BLD AUTO: 3.01 THOUSANDS/ΜL (ref 0.6–4.47)
LYMPHOCYTES NFR BLD AUTO: 30 % (ref 14–44)
MCH RBC QN AUTO: 31.2 PG (ref 26.8–34.3)
MCHC RBC AUTO-ENTMCNC: 34 G/DL (ref 31.4–37.4)
MCV RBC AUTO: 92 FL (ref 82–98)
MONOCYTES # BLD AUTO: 1.39 THOUSAND/ΜL (ref 0.17–1.22)
MONOCYTES NFR BLD AUTO: 14 % (ref 4–12)
NEUTROPHILS # BLD AUTO: 5.01 THOUSANDS/ΜL (ref 1.85–7.62)
NEUTS SEG NFR BLD AUTO: 49 % (ref 43–75)
NRBC BLD AUTO-RTO: 0 /100 WBCS
PLATELET # BLD AUTO: 254 THOUSANDS/UL (ref 149–390)
PMV BLD AUTO: 11.3 FL (ref 8.9–12.7)
POTASSIUM SERPL-SCNC: 3.9 MMOL/L (ref 3.5–5.3)
RBC # BLD AUTO: 4.1 MILLION/UL (ref 3.88–5.62)
SODIUM SERPL-SCNC: 140 MMOL/L (ref 136–145)
WBC # BLD AUTO: 10.07 THOUSAND/UL (ref 4.31–10.16)

## 2020-08-18 PROCEDURE — 99238 HOSP IP/OBS DSCHRG MGMT 30/<: CPT | Performed by: FAMILY MEDICINE

## 2020-08-18 PROCEDURE — 85025 COMPLETE CBC W/AUTO DIFF WBC: CPT | Performed by: FAMILY MEDICINE

## 2020-08-18 PROCEDURE — 82948 REAGENT STRIP/BLOOD GLUCOSE: CPT

## 2020-08-18 PROCEDURE — 80048 BASIC METABOLIC PNL TOTAL CA: CPT | Performed by: FAMILY MEDICINE

## 2020-08-18 RX ORDER — NAPROXEN 500 MG/1
500 TABLET ORAL 2 TIMES DAILY WITH MEALS
Qty: 20 TABLET | Refills: 0 | Status: SHIPPED | OUTPATIENT
Start: 2020-08-18 | End: 2022-07-30

## 2020-08-18 RX ORDER — AMOXICILLIN AND CLAVULANATE POTASSIUM 875; 125 MG/1; MG/1
1 TABLET, FILM COATED ORAL EVERY 12 HOURS SCHEDULED
Qty: 10 TABLET | Refills: 0 | Status: SHIPPED | OUTPATIENT
Start: 2020-08-18 | End: 2020-08-23

## 2020-08-18 RX ORDER — OXYCODONE AND ACETAMINOPHEN 2.5; 325 MG/1; MG/1
1 TABLET ORAL EVERY 4 HOURS PRN
Qty: 30 TABLET | Refills: 0 | Status: SHIPPED | OUTPATIENT
Start: 2020-08-18 | End: 2020-08-28

## 2020-08-18 RX ORDER — KETOROLAC TROMETHAMINE 30 MG/ML
15 INJECTION, SOLUTION INTRAMUSCULAR; INTRAVENOUS EVERY 6 HOURS PRN
Status: DISCONTINUED | OUTPATIENT
Start: 2020-08-18 | End: 2020-08-18 | Stop reason: HOSPADM

## 2020-08-18 RX ADMIN — ENOXAPARIN SODIUM 40 MG: 40 INJECTION SUBCUTANEOUS at 08:17

## 2020-08-18 RX ADMIN — ACETAMINOPHEN 975 MG: 325 TABLET, FILM COATED ORAL at 06:14

## 2020-08-18 RX ADMIN — CLINDAMYCIN IN 5 PERCENT DEXTROSE 600 MG: 12 INJECTION, SOLUTION INTRAVENOUS at 11:03

## 2020-08-18 RX ADMIN — POTASSIUM CHLORIDE 40 MEQ: 1500 TABLET, EXTENDED RELEASE ORAL at 08:17

## 2020-08-18 RX ADMIN — LISINOPRIL: 20 TABLET ORAL at 08:17

## 2020-08-18 RX ADMIN — CLINDAMYCIN IN 5 PERCENT DEXTROSE 600 MG: 12 INJECTION, SOLUTION INTRAVENOUS at 02:58

## 2020-08-18 RX ADMIN — KETOROLAC TROMETHAMINE 15 MG: 30 INJECTION, SOLUTION INTRAMUSCULAR at 08:18

## 2020-08-18 NOTE — SOCIAL WORK
Pt speaks Grenadian only and reported [place]  CM requested pt to use the [email or web address] to interpret, pt was agreeable  CM informed pt that there is no Grenadian speaking person on the flr to interpret  CM asked pt if CM can call his family but pt stated that his father is also [facility]  CM use the [facility] telephone, Georges Handy- [phone]  CM requested Melba to speak louder and slowly for pt to understand  CM introduce self and made aware of Cm role  Pt's primary contact is his father Kelley Salazar- [phone]  Pt lives with his father on a 2nd flr apt with 7-8 KERRI  Pt was IPTA with all ADL's and does not drive  Pt reported that his father transport him to his appts  Pt does not use any DME's  PCP is [address] [facility]  Pt uses [employer]  Pt denies hx with HHC, alc and drug tx  When CM inquired with IP psych tx, pt kept on repeating that he is having a hard time hearing the questions  CM asked pt if he wants CM to stop the assessment questions and pt stated "Okay"  CM reviewed d/c planning process including the following: identifying help at home, patient preference for d/c planning needs, Discharge Lounge, [facility] Meds to Bed program, availability of treatment team to discuss questions or concerns patient and/or family may have regarding understanding medications and recognizing signs and symptoms once discharged  CM also encouraged patient to follow up with all recommended appointments after discharge  Patient advised of importance for patient and family to participate in managing patients medical well being

## 2020-08-18 NOTE — DISCHARGE INSTRUCTIONS
10% - bad control"> 10% - bad control,Hemoglobin A1c (HbA1c) greater than 10% indicating poor diabetic control,Haemoglobin A1c greater than 10% indicating poor diabetic control">   Diabetes mellitus tipo 2 en adultos, cuidados ambulatorios   INFORMACIÓN GENERAL:   La diabetes mellitus tipo 2 en adultos  es brad enfermedad que afecta la forma en que el cuerpo utiliza la glucosa (azúcar)  La insulina ayuda a extraer el azúcar de la [place] para que pueda usarse en la producción de energía  Generalmente, cuando el nivel de azúcar Lisa, el páncreas produce más insulina  La diabetes tipo 2 se desarrolla ya sea porque el cuerpo no puede producir suficiente insulina, o no la puede usar correctamente  Después de [employer], roberts páncreas podría dejar de producir insulina  Síntomas comunes incluyen los siguientes:   · Más hambre o sed de la usual    · Necesidad frecuente de orinar     · Pérdida de peso sin tratar     · Visión borrosa  Busque cuidados inmediatos para los siguientes síntomas:   · Dolor abdominal severo o dolor que se propaga hacia roberts espalda  Es probable que usted también vomite  · Dificultad para permanecer despierto o concentrado    · Temblores o sudoración    · Visión borrosa o doble    · Aliento con olor a frutas o genaro    · Respiración profunda y dificultosa o rápida y superficial    · Ritmo cardíaco rápido y débil  El tratamiento para la diabetes mellitus tipo 2  incluye mantener roberts nivel de azúcar en el [place]il[place] a un rango normal  Usted debe comer los alimentos correctos y ejercitarse con regularidad  Además es probable que necesite medicamentos si no puede controlar roberts nivel de azúcar en la [place]il[place] con nutrición y ejercicio  Maneje la diabetes mellitus tipo 2:   · Revise roberts nivel de azúcar en la [place]il[place]  A usted le enseñarán cómo revisar brad pequeña gota de [place] mal[place] en un medidor de glucosa  Pregúntele a roberts proveedor de napoleon cuándo y con cuánta frecuencia es necesario revisar toñito el día   Cynthia Craig pregúntele a roberts proveedor de napoleon cuáles deberían ser philly niveles de azúcar en la akil[place] cuando usted se los revisa  · Mantenga un registro de los carbohidratos (azúcares y almidones)  Roberts nivel de azúcar en la akil[place] puede elevarse demasiado si usted come demasiados carbohidratos  Roberts dietista le ayudará a planear comidas y meriendas que tengan la cantidad correcta de carbohidratos  · Consuma alimentos bajos en grasas  Maurita Billy son el doug sin piel y la leche descremada  · Consuma menos sodio (sal)  Algunos ejemplos de alimentos altos en sodio que hay que limitar son la salsa de soya, las greta tostadas y la sopa  No le agregue sal a la comida que usted cocina  Limite roberts uso de sal de julien  · Coma alimentos altos en fibra  Alimentos que son buena kedar de fibra incluyen los vegetales, el pan integral y los frijoles  · Limite el alcohol  El alcohol afecta roberts nivel de azúcar en la akil[place] y puede dificultar el [place] de roberts diabetes  Las mujeres deben limitar el consumo de alcohol a 1 bebida al día  Los hombres deben limitarlo a 2 debidas al día  Orquidea bebida de alcohol equivale a 12 onzas de cerveza, 5 onzas de vino o 1½ onzas de licor  · Ejercítese regularmente  El ejercicio puede ayudar a mantener estable roberts nivel de azúcar en la [place], al mismo tiempo que disminuye roberts riesgo de enfermedad cardíaca y le ayuda a perder peso  Ejercítese por al menos 30 minutos, 5 días a la semana  Mian Long de fortalecimiento muscular 2 días a la semana  Colabore con roberts proveedor de napoleon para crear un plan de ejercicios  · Revise philly pies a diario  para mario si tienen heridas o llagas abiertas  Pregúntele a roberts proveedor de [employer] que usted puede hacer si tiene orquidea llaga abierta  · Deje de fumar  Si usted fuma, nunca es tarde para dejar de hacerlo  El fumar puede [employer] problemas que puedan ocurrir con la diabetes   Pregúntele a roberts proveedor de [employer] información para aprender a dejar de fumar si usted tiene dificultad para hacerlo  · Pregunte sobre flynn peso:  Pregúntele a philly proveedores de napoleon si usted necesita perder peso y cuánto debe perder  Pídales que le ayuden con un programa de perdida de [place]  Incluso perder unas 10 a 15 libras puede ayudarle a manejar flynn nivel de azúcar en la akil[place]  · Tenga a mano flynn identificación de [employer]  Use un brazalete de alerta médica o lleve consigo brad tarjeta que diga que usted tiene diabetes  Pregúntele a flynn proveedor de napoleon dónde conseguir estos artículos  · Pregunte sobre vacunas  La diabetes lo pone a usted en riesgo de enfermedad seria si usted se resfría, tiene neumonía o hepatitis  Pregúntele a flynn proveedor de napoleon si usted debe ponerse las vacunas contra la gripe, neumonía o hepatitis B, y cuándo ponérselas  Programe brad jaime con flynn proveedor de [employer] se le haya indicado: Anote philly preguntas para que se acuerde de hacerlas toñito philly visitas  ACUERDOS SOBRE FLYNN CUIDADO:   Usted tiene el derecho de participar en la planificación de flynn cuidado  Aprenda todo lo que pueda sobre flynn condición y sheyla darle tratamiento  Discuta con philly médicos philly opciones de tratamiento para juntos decidir el cuidado que usted quiere recibir  Usted siempre tiene el derecho a rechazar flynn tratamiento  Esta información es sólo para uso en educación  Flynn intención no es darle un consejo médico sobre enfermedades o tratamientos  Colsulte con flynn Ozie Sharp farmacéutico antes de seguir cualquier régimen médico para saber si es seguro y efectivo para usted  © 2014 [address] is for End User's use only and may not be sold, redistributed or otherwise used for commercial purposes  All illustrations and images included in CareNotes® are the copyrighted property of A D A M , Inc  or Armando Patricia

## 2020-08-19 NOTE — UTILIZATION REVIEW
Notification of Discharge  This is a Notification of Discharge from our facility [address]  Please be advised that this patient has been discharge from our facility  Below you will find the admission and discharge date and time including the patients disposition  PRESENTATION DATE: 8/15/2020 11:29 PM  OBS ADMISSION DATE:   IP ADMISSION DATE: 8/15/20 2329   DISCHARGE DATE: 8/18/2020  2:11 PM  DISPOSITION: Home/Self Care Home/Self Care   Admission Orders listed below:  Admission Orders (From admission, onward)     Ordered        08/17/20 1142  Inpatient Admission  Once         08/16/20 0042  Place in Observation  Once                   Please contact the UR Department if additional information is required to close this patient's authorization/case  [address] Utilization Review Department  Main: [phone] x carefully listen to the prompts  All voicemails are confidential   [email or web address]  Send all requests for admission clinical reviews, approved or denied determinations and any other requests to dedicated fax number below belonging to the campus where the patient is receiving treatment   List of dedicated fax numbers:  [address] DENIALS (Administrative/Medical Necessity) [phone]   [address] [place] [address] (Maternity/NICU/Pediatrics) [phone]   [facility] [phone]     [address] [phone]   Deyvi Vines [phone]   [facility] [address] [place] [address] [phone]   [facility]  [phone]   [address] [facility], [address]  2401 [facility] 1000 [address] [facility] [phone]

## 2020-08-20 LAB
BACTERIA BLD CULT: NORMAL
BACTERIA BLD CULT: NORMAL

## 2020-11-26 ENCOUNTER — APPOINTMENT (EMERGENCY)
Dept: RADIOLOGY | Facility: HOSPITAL | Age: 50
End: 2020-11-26
Payer: COMMERCIAL

## 2020-11-26 ENCOUNTER — HOSPITAL ENCOUNTER (EMERGENCY)
Facility: HOSPITAL | Age: 50
Discharge: HOME | End: 2020-11-26
Attending: EMERGENCY MEDICINE | Admitting: EMERGENCY MEDICINE
Payer: COMMERCIAL

## 2020-11-26 VITALS
SYSTOLIC BLOOD PRESSURE: 146 MMHG | WEIGHT: 143.3 LBS | BODY MASS INDEX: 23.85 KG/M2 | TEMPERATURE: 98.2 F | OXYGEN SATURATION: 99 % | HEART RATE: 75 BPM | RESPIRATION RATE: 18 BRPM | DIASTOLIC BLOOD PRESSURE: 85 MMHG

## 2020-11-26 DIAGNOSIS — E87.6 HYPOKALEMIA: ICD-10-CM

## 2020-11-26 DIAGNOSIS — E16.2 HYPOGLYCEMIA: Primary | ICD-10-CM

## 2020-11-26 LAB
ALBUMIN SERPL BCP-MCNC: 3.3 G/DL (ref 3.5–5)
ALP SERPL-CCNC: 68 U/L (ref 46–116)
ALT SERPL W P-5'-P-CCNC: 37 U/L (ref 12–78)
ANION GAP SERPL CALCULATED.3IONS-SCNC: 10 MMOL/L (ref 4–13)
AST SERPL W P-5'-P-CCNC: 62 U/L (ref 5–45)
BASOPHILS # BLD AUTO: 0.01 THOUSANDS/ΜL (ref 0–0.1)
BASOPHILS NFR BLD AUTO: 0 % (ref 0–1)
BILIRUB SERPL-MCNC: 0.55 MG/DL (ref 0.2–1)
BUN SERPL-MCNC: 8 MG/DL (ref 5–25)
CALCIUM ALBUM COR SERPL-MCNC: 8.4 MG/DL (ref 8.3–10.1)
CALCIUM SERPL-MCNC: 7.8 MG/DL (ref 8.3–10.1)
CHLORIDE SERPL-SCNC: 99 MMOL/L (ref 100–108)
CO2 SERPL-SCNC: 27 MMOL/L (ref 21–32)
CREAT SERPL-MCNC: 1.1 MG/DL (ref 0.6–1.3)
EOSINOPHIL # BLD AUTO: 0.01 THOUSAND/ΜL (ref 0–0.61)
EOSINOPHIL NFR BLD AUTO: 0 % (ref 0–6)
ERYTHROCYTE [DISTWIDTH] IN BLOOD BY AUTOMATED COUNT: 11.8 % (ref 11.6–15.1)
GFR SERPL CREATININE-BSD FRML MDRD: 78 ML/MIN/1.73SQ M
GLUCOSE SERPL-MCNC: 173 MG/DL (ref 65–140)
GLUCOSE SERPL-MCNC: 179 MG/DL (ref 65–140)
GLUCOSE SERPL-MCNC: 264 MG/DL (ref 65–140)
GLUCOSE SERPL-MCNC: 32 MG/DL (ref 65–140)
HCT VFR BLD AUTO: 34.3 % (ref 36.5–49.3)
HGB BLD-MCNC: 11.9 G/DL (ref 12–17)
IMM GRANULOCYTES # BLD AUTO: 0.01 THOUSAND/UL (ref 0–0.2)
IMM GRANULOCYTES NFR BLD AUTO: 0 % (ref 0–2)
LIPASE SERPL-CCNC: 24 U/L (ref 73–393)
LYMPHOCYTES # BLD AUTO: 0.56 THOUSANDS/ΜL (ref 0.6–4.47)
LYMPHOCYTES NFR BLD AUTO: 10 % (ref 14–44)
MCH RBC QN AUTO: 31.9 PG (ref 26.8–34.3)
MCHC RBC AUTO-ENTMCNC: 34.7 G/DL (ref 31.4–37.4)
MCV RBC AUTO: 92 FL (ref 82–98)
MONOCYTES # BLD AUTO: 0.52 THOUSAND/ΜL (ref 0.17–1.22)
MONOCYTES NFR BLD AUTO: 9 % (ref 4–12)
NEUTROPHILS # BLD AUTO: 4.59 THOUSANDS/ΜL (ref 1.85–7.62)
NEUTS SEG NFR BLD AUTO: 81 % (ref 43–75)
NRBC BLD AUTO-RTO: 0 /100 WBCS
NT-PROBNP SERPL-MCNC: 58 PG/ML
PLATELET # BLD AUTO: 185 THOUSANDS/UL (ref 149–390)
PMV BLD AUTO: 9.7 FL (ref 8.9–12.7)
POTASSIUM SERPL-SCNC: 2.9 MMOL/L (ref 3.5–5.3)
PROT SERPL-MCNC: 6.9 G/DL (ref 6.4–8.2)
RBC # BLD AUTO: 3.73 MILLION/UL (ref 3.88–5.62)
SODIUM SERPL-SCNC: 136 MMOL/L (ref 136–145)
TROPONIN I SERPL-MCNC: <0.02 NG/ML
WBC # BLD AUTO: 5.7 THOUSAND/UL (ref 4.31–10.16)

## 2020-11-26 PROCEDURE — 85025 COMPLETE CBC W/AUTO DIFF WBC: CPT | Performed by: EMERGENCY MEDICINE

## 2020-11-26 PROCEDURE — 99285 EMERGENCY DEPT VISIT HI MDM: CPT | Performed by: EMERGENCY MEDICINE

## 2020-11-26 PROCEDURE — 36415 COLL VENOUS BLD VENIPUNCTURE: CPT | Performed by: EMERGENCY MEDICINE

## 2020-11-26 PROCEDURE — 82948 REAGENT STRIP/BLOOD GLUCOSE: CPT

## 2020-11-26 PROCEDURE — 83880 ASSAY OF NATRIURETIC PEPTIDE: CPT | Performed by: EMERGENCY MEDICINE

## 2020-11-26 PROCEDURE — 99285 EMERGENCY DEPT VISIT HI MDM: CPT

## 2020-11-26 PROCEDURE — 93005 ELECTROCARDIOGRAM TRACING: CPT

## 2020-11-26 PROCEDURE — 96374 THER/PROPH/DIAG INJ IV PUSH: CPT

## 2020-11-26 PROCEDURE — 71045 X-RAY EXAM CHEST 1 VIEW: CPT

## 2020-11-26 PROCEDURE — 83690 ASSAY OF LIPASE: CPT | Performed by: EMERGENCY MEDICINE

## 2020-11-26 PROCEDURE — 80053 COMPREHEN METABOLIC PANEL: CPT | Performed by: EMERGENCY MEDICINE

## 2020-11-26 PROCEDURE — 96361 HYDRATE IV INFUSION ADD-ON: CPT

## 2020-11-26 PROCEDURE — 84484 ASSAY OF TROPONIN QUANT: CPT | Performed by: EMERGENCY MEDICINE

## 2020-11-26 RX ORDER — POTASSIUM CHLORIDE 20 MEQ/1
20 TABLET, EXTENDED RELEASE ORAL ONCE
Status: COMPLETED | OUTPATIENT
Start: 2020-11-26 | End: 2020-11-26

## 2020-11-26 RX ORDER — IBUPROFEN 600 MG/1
1 TABLET ORAL ONCE AS NEEDED
Qty: 1 KIT | Refills: 0 | Status: SHIPPED | OUTPATIENT
Start: 2020-11-26 | End: 2022-07-30

## 2020-11-26 RX ORDER — DEXTROSE MONOHYDRATE 25 G/50ML
INJECTION, SOLUTION INTRAVENOUS
Status: COMPLETED
Start: 2020-11-26 | End: 2020-11-26

## 2020-11-26 RX ORDER — DIAZEPAM 5 MG/ML
1 SYRINGE (ML) INJECTION ONCE
Status: COMPLETED | OUTPATIENT
Start: 2020-11-26 | End: 2020-11-26

## 2020-11-26 RX ORDER — DEXTROSE MONOHYDRATE 25 G/50ML
50 INJECTION, SOLUTION INTRAVENOUS ONCE
Status: COMPLETED | OUTPATIENT
Start: 2020-11-26 | End: 2020-11-26

## 2020-11-26 RX ADMIN — POTASSIUM CHLORIDE 20 MEQ: 1500 TABLET, EXTENDED RELEASE ORAL at 18:02

## 2020-11-26 RX ADMIN — SODIUM CHLORIDE 1000 ML: 0.9 INJECTION, SOLUTION INTRAVENOUS at 16:05

## 2020-11-26 RX ADMIN — DEXTROSE MONOHYDRATE 50 ML: 500 INJECTION PARENTERAL at 16:05

## 2020-11-26 RX ADMIN — DEXTROSE MONOHYDRATE: 500 INJECTION PARENTERAL at 16:05

## 2020-11-27 LAB
ATRIAL RATE: 441 BPM
QRS AXIS: 49 DEGREES
QRSD INTERVAL: 102 MS
QT INTERVAL: 400 MS
QTC INTERVAL: 412 MS
T WAVE AXIS: 29 DEGREES
VENTRICULAR RATE: 64 BPM

## 2020-11-27 PROCEDURE — 93010 ELECTROCARDIOGRAM REPORT: CPT | Performed by: INTERNAL MEDICINE

## 2021-01-20 ENCOUNTER — TELEPHONE (OUTPATIENT)
Dept: FAMILY MEDICINE CLINIC | Facility: CLINIC | Age: 51
End: 2021-01-20

## 2021-01-20 NOTE — TELEPHONE ENCOUNTER
----- Message from Lindsey Fair PA-C sent at 1/19/2021  4:06 PM EST -----  Regarding: diabetes followup  Needs in person dm f/u

## 2021-05-18 ENCOUNTER — TELEPHONE (OUTPATIENT)
Dept: FAMILY MEDICINE CLINIC | Facility: CLINIC | Age: 51
End: 2021-05-18

## 2021-05-18 NOTE — TELEPHONE ENCOUNTER
----- Message from [id number] [place], [place] [address]TOI sent at 5/17/2021  3:59 PM EDT -----  Regarding: dm  Needs dm f/u

## 2021-06-29 DIAGNOSIS — Z79.4 TYPE 2 DIABETES MELLITUS WITH HYPERGLYCEMIA, WITH LONG-TERM CURRENT USE OF INSULIN (HCC): ICD-10-CM

## 2021-06-29 DIAGNOSIS — E11.65 TYPE 2 DIABETES MELLITUS WITH HYPERGLYCEMIA, WITH LONG-TERM CURRENT USE OF INSULIN (HCC): ICD-10-CM

## 2021-06-30 RX ORDER — INSULIN GLARGINE 100 [IU]/ML
25 INJECTION, SOLUTION SUBCUTANEOUS DAILY
Qty: 15 ML | Refills: 0 | OUTPATIENT
Start: 2021-06-30 | End: 2022-01-30

## 2021-10-27 ENCOUNTER — TELEPHONE (OUTPATIENT)
Dept: FAMILY MEDICINE CLINIC | Facility: CLINIC | Age: 51
End: 2021-10-27

## 2022-01-30 ENCOUNTER — APPOINTMENT (EMERGENCY)
Dept: CT IMAGING | Facility: HOSPITAL | Age: 52
End: 2022-01-30
Payer: COMMERCIAL

## 2022-01-30 ENCOUNTER — TELEPHONE (OUTPATIENT)
Dept: OTHER | Facility: OTHER | Age: 52
End: 2022-01-30

## 2022-01-30 ENCOUNTER — HOSPITAL ENCOUNTER (EMERGENCY)
Facility: HOSPITAL | Age: 52
Discharge: HOME | End: 2022-01-30
Attending: EMERGENCY MEDICINE
Payer: COMMERCIAL

## 2022-01-30 VITALS
RESPIRATION RATE: 16 BRPM | WEIGHT: 132.5 LBS | OXYGEN SATURATION: 99 % | HEART RATE: 84 BPM | SYSTOLIC BLOOD PRESSURE: 170 MMHG | TEMPERATURE: 97.8 F | BODY MASS INDEX: 22.05 KG/M2 | DIASTOLIC BLOOD PRESSURE: 85 MMHG

## 2022-01-30 DIAGNOSIS — E11.65 TYPE 2 DIABETES MELLITUS WITH HYPERGLYCEMIA, WITH LONG-TERM CURRENT USE OF INSULIN (HCC): ICD-10-CM

## 2022-01-30 DIAGNOSIS — G89.29 CHRONIC BACK PAIN: ICD-10-CM

## 2022-01-30 DIAGNOSIS — Z79.4 TYPE 2 DIABETES MELLITUS WITHOUT COMPLICATION, WITH LONG-TERM CURRENT USE OF INSULIN (HCC): ICD-10-CM

## 2022-01-30 DIAGNOSIS — K86.1 CHRONIC PANCREATITIS (HCC): ICD-10-CM

## 2022-01-30 DIAGNOSIS — M54.9 CHRONIC BACK PAIN: ICD-10-CM

## 2022-01-30 DIAGNOSIS — Z79.4 TYPE 2 DIABETES MELLITUS WITH HYPERGLYCEMIA, WITH LONG-TERM CURRENT USE OF INSULIN (HCC): ICD-10-CM

## 2022-01-30 DIAGNOSIS — R73.9 HYPERGLYCEMIA: Primary | ICD-10-CM

## 2022-01-30 DIAGNOSIS — E11.9 TYPE 2 DIABETES MELLITUS WITHOUT COMPLICATION, WITH LONG-TERM CURRENT USE OF INSULIN (HCC): ICD-10-CM

## 2022-01-30 DIAGNOSIS — F10.20 CHRONIC ALCOHOLISM (HCC): ICD-10-CM

## 2022-01-30 LAB
ALBUMIN SERPL BCP-MCNC: 4 G/DL (ref 3.5–5)
ALP SERPL-CCNC: 85 U/L (ref 46–116)
ALT SERPL W P-5'-P-CCNC: 37 U/L (ref 12–78)
ANION GAP SERPL CALCULATED.3IONS-SCNC: 9 MMOL/L (ref 4–13)
AST SERPL W P-5'-P-CCNC: 30 U/L (ref 5–45)
ATRIAL RATE: 66 BPM
BACTERIA UR QL AUTO: ABNORMAL /HPF
BASE EX.OXY STD BLDV CALC-SCNC: 27.5 % (ref 60–80)
BASE EXCESS BLDV CALC-SCNC: 4.1 MMOL/L
BASOPHILS # BLD AUTO: 0.02 THOUSANDS/ΜL (ref 0–0.1)
BASOPHILS NFR BLD AUTO: 0 % (ref 0–1)
BILIRUB DIRECT SERPL-MCNC: 0.21 MG/DL (ref 0–0.2)
BILIRUB SERPL-MCNC: 1.02 MG/DL (ref 0.2–1)
BILIRUB UR QL STRIP: NEGATIVE
BUN SERPL-MCNC: 13 MG/DL (ref 5–25)
CALCIUM SERPL-MCNC: 9.1 MG/DL (ref 8.3–10.1)
CHLORIDE SERPL-SCNC: 98 MMOL/L (ref 100–108)
CLARITY UR: CLEAR
CO2 SERPL-SCNC: 32 MMOL/L (ref 21–32)
COLOR UR: YELLOW
CREAT SERPL-MCNC: 1.18 MG/DL (ref 0.6–1.3)
EOSINOPHIL # BLD AUTO: 0.1 THOUSAND/ΜL (ref 0–0.61)
EOSINOPHIL NFR BLD AUTO: 1 % (ref 0–6)
ERYTHROCYTE [DISTWIDTH] IN BLOOD BY AUTOMATED COUNT: 11.8 % (ref 11.6–15.1)
GFR SERPL CREATININE-BSD FRML MDRD: 71 ML/MIN/1.73SQ M
GLUCOSE SERPL-MCNC: 283 MG/DL (ref 65–140)
GLUCOSE SERPL-MCNC: 358 MG/DL (ref 65–140)
GLUCOSE UR STRIP-MCNC: ABNORMAL MG/DL
HCO3 BLDV-SCNC: 31.8 MMOL/L (ref 24–30)
HCT VFR BLD AUTO: 42.3 % (ref 36.5–49.3)
HGB BLD-MCNC: 15.2 G/DL (ref 12–17)
HGB UR QL STRIP.AUTO: NEGATIVE
HYALINE CASTS #/AREA URNS LPF: ABNORMAL /LPF
IMM GRANULOCYTES # BLD AUTO: 0.03 THOUSAND/UL (ref 0–0.2)
IMM GRANULOCYTES NFR BLD AUTO: 0 % (ref 0–2)
KETONES UR STRIP-MCNC: NEGATIVE MG/DL
LEUKOCYTE ESTERASE UR QL STRIP: ABNORMAL
LIPASE SERPL-CCNC: 14 U/L (ref 73–393)
LYMPHOCYTES # BLD AUTO: 1.89 THOUSANDS/ΜL (ref 0.6–4.47)
LYMPHOCYTES NFR BLD AUTO: 16 % (ref 14–44)
MCH RBC QN AUTO: 33.5 PG (ref 26.8–34.3)
MCHC RBC AUTO-ENTMCNC: 35.9 G/DL (ref 31.4–37.4)
MCV RBC AUTO: 93 FL (ref 82–98)
MONOCYTES # BLD AUTO: 0.84 THOUSAND/ΜL (ref 0.17–1.22)
MONOCYTES NFR BLD AUTO: 7 % (ref 4–12)
NEUTROPHILS # BLD AUTO: 8.61 THOUSANDS/ΜL (ref 1.85–7.62)
NEUTS SEG NFR BLD AUTO: 76 % (ref 43–75)
NITRITE UR QL STRIP: NEGATIVE
NON-SQ EPI CELLS URNS QL MICRO: ABNORMAL /HPF
NRBC BLD AUTO-RTO: 0 /100 WBCS
O2 CT BLDV-SCNC: 6 ML/DL
P AXIS: 55 DEGREES
PCO2 BLDV: 59.9 MM HG (ref 42–50)
PH BLDV: 7.34 [PH] (ref 7.3–7.4)
PH UR STRIP.AUTO: 5.5 [PH] (ref 4.5–8)
PLATELET # BLD AUTO: 157 THOUSANDS/UL (ref 149–390)
PMV BLD AUTO: 10.8 FL (ref 8.9–12.7)
PO2 BLDV: 18 MM HG (ref 35–45)
POTASSIUM SERPL-SCNC: 3.5 MMOL/L (ref 3.5–5.3)
PR INTERVAL: 146 MS
PROT SERPL-MCNC: 8 G/DL (ref 6.4–8.2)
PROT UR STRIP-MCNC: ABNORMAL MG/DL
QRS AXIS: 35 DEGREES
QRSD INTERVAL: 90 MS
QT INTERVAL: 398 MS
QTC INTERVAL: 417 MS
RBC # BLD AUTO: 4.54 MILLION/UL (ref 3.88–5.62)
RBC #/AREA URNS AUTO: ABNORMAL /HPF
SODIUM SERPL-SCNC: 139 MMOL/L (ref 136–145)
SP GR UR STRIP.AUTO: >=1.03 (ref 1–1.03)
T WAVE AXIS: -4 DEGREES
UROBILINOGEN UR QL STRIP.AUTO: 0.2 E.U./DL
VENTRICULAR RATE: 66 BPM
WBC # BLD AUTO: 11.49 THOUSAND/UL (ref 4.31–10.16)
WBC #/AREA URNS AUTO: ABNORMAL /HPF

## 2022-01-30 PROCEDURE — 93005 ELECTROCARDIOGRAM TRACING: CPT

## 2022-01-30 PROCEDURE — 82805 BLOOD GASES W/O2 SATURATION: CPT | Performed by: EMERGENCY MEDICINE

## 2022-01-30 PROCEDURE — G1004 CDSM NDSC: HCPCS

## 2022-01-30 PROCEDURE — 99285 EMERGENCY DEPT VISIT HI MDM: CPT | Performed by: EMERGENCY MEDICINE

## 2022-01-30 PROCEDURE — 82948 REAGENT STRIP/BLOOD GLUCOSE: CPT

## 2022-01-30 PROCEDURE — 81001 URINALYSIS AUTO W/SCOPE: CPT

## 2022-01-30 PROCEDURE — 96365 THER/PROPH/DIAG IV INF INIT: CPT

## 2022-01-30 PROCEDURE — 83690 ASSAY OF LIPASE: CPT | Performed by: EMERGENCY MEDICINE

## 2022-01-30 PROCEDURE — 36415 COLL VENOUS BLD VENIPUNCTURE: CPT | Performed by: EMERGENCY MEDICINE

## 2022-01-30 PROCEDURE — 74177 CT ABD & PELVIS W/CONTRAST: CPT

## 2022-01-30 PROCEDURE — 93010 ELECTROCARDIOGRAM REPORT: CPT

## 2022-01-30 PROCEDURE — 99284 EMERGENCY DEPT VISIT MOD MDM: CPT

## 2022-01-30 PROCEDURE — 80076 HEPATIC FUNCTION PANEL: CPT | Performed by: EMERGENCY MEDICINE

## 2022-01-30 PROCEDURE — 80048 BASIC METABOLIC PNL TOTAL CA: CPT | Performed by: EMERGENCY MEDICINE

## 2022-01-30 PROCEDURE — 85025 COMPLETE CBC W/AUTO DIFF WBC: CPT | Performed by: EMERGENCY MEDICINE

## 2022-01-30 RX ORDER — LIDOCAINE 50 MG/G
1 PATCH TOPICAL ONCE
Status: DISCONTINUED | OUTPATIENT
Start: 2022-01-30 | End: 2022-01-30 | Stop reason: HOSPADM

## 2022-01-30 RX ORDER — PEN NEEDLE, DIABETIC 32GX 5/32"
NEEDLE, DISPOSABLE MISCELLANEOUS DAILY
Qty: 100 EACH | Refills: 0 | Status: SHIPPED | OUTPATIENT
Start: 2022-01-30 | End: 2022-07-29

## 2022-01-30 RX ORDER — INSULIN GLARGINE 100 [IU]/ML
25 INJECTION, SOLUTION SUBCUTANEOUS DAILY
Qty: 15 ML | Refills: 0 | Status: SHIPPED | OUTPATIENT
Start: 2022-01-30 | End: 2022-01-30 | Stop reason: SDUPTHER

## 2022-01-30 RX ORDER — LANCETS
EACH MISCELLANEOUS
Qty: 100 EACH | Refills: 0 | Status: SHIPPED | OUTPATIENT
Start: 2022-01-30 | End: 2022-01-30 | Stop reason: SDUPTHER

## 2022-01-30 RX ORDER — DIAZEPAM 5 MG/1
5 TABLET ORAL ONCE
Status: COMPLETED | OUTPATIENT
Start: 2022-01-30 | End: 2022-01-30

## 2022-01-30 RX ORDER — LANCETS
EACH MISCELLANEOUS
Qty: 100 EACH | Refills: 0 | Status: SHIPPED | OUTPATIENT
Start: 2022-01-30

## 2022-01-30 RX ORDER — INSULIN GLARGINE 100 [IU]/ML
25 INJECTION, SOLUTION SUBCUTANEOUS DAILY
Qty: 15 ML | Refills: 0 | Status: ON HOLD | OUTPATIENT
Start: 2022-01-30 | End: 2022-07-30 | Stop reason: SDUPTHER

## 2022-01-30 RX ORDER — PEN NEEDLE, DIABETIC 32GX 5/32"
NEEDLE, DISPOSABLE MISCELLANEOUS DAILY
Qty: 100 EACH | Refills: 0 | Status: SHIPPED | OUTPATIENT
Start: 2022-01-30 | End: 2022-01-30 | Stop reason: SDUPTHER

## 2022-01-30 RX ORDER — ACETAMINOPHEN 325 MG/1
975 TABLET ORAL ONCE
Status: COMPLETED | OUTPATIENT
Start: 2022-01-30 | End: 2022-01-30

## 2022-01-30 RX ADMIN — LIDOCAINE 1 PATCH: 50 PATCH CUTANEOUS at 10:48

## 2022-01-30 RX ADMIN — DIAZEPAM 5 MG: 5 TABLET ORAL at 10:49

## 2022-01-30 RX ADMIN — SODIUM CHLORIDE, SODIUM LACTATE, POTASSIUM CHLORIDE, AND CALCIUM CHLORIDE 1000 ML: .6; .31; .03; .02 INJECTION, SOLUTION INTRAVENOUS at 10:41

## 2022-01-30 RX ADMIN — ACETAMINOPHEN 975 MG: 325 TABLET, FILM COATED ORAL at 10:48

## 2022-01-30 RX ADMIN — IOHEXOL 100 ML: 350 INJECTION, SOLUTION INTRAVENOUS at 13:42

## 2022-01-30 NOTE — TELEPHONE ENCOUNTER
Dr Laney Branch calling from [address] [facility] called to check on his medication list  They are releasing him and the patient kept saying that he was on Lantis for his diabetes  We both looked through the chart and were unable to find anything   She is going to refill his current medications but would like his PCP to reach out to him or either the office to call and make sure he is on the correct medication

## 2022-01-30 NOTE — ED NOTES
IV blown at CT as per CT Tech  RN will obtain IV access shortly       Sherice Murphy RN  01/30/22 2074

## 2022-01-30 NOTE — DISCHARGE INSTRUCTIONS
Ibuprofen as needed for pain  Closely monitor your glucose at home and take all your home medications    Follow up with family doctor    Return to ER if new/worsening symptoms including but not limited to intractable pain/vomiting, numbness, weakness, etc

## 2022-01-30 NOTE — ED PROVIDER NOTES
History  Chief Complaint   Patient presents with    Medical Problem     Pt reports not feeling well, states he is diabetic and has back pain, states yesterday his family member found him unresponsive, states his blood sugar was found by EMS to be in the 20s but he states he did not get transported to the hospital since he felt better  States he has not checked his blood sugar since then  47 yo M h/o IDDM, alcohol abuse, chronic pancreatitis, pAfib presenting for evaluation of b/l flank pain  [place] speaking- [employer]  used to obtain history  Pt c/o b/l flank pain, reports present for a "long time"/years, taking NSAIDs/Tylenol PRN, no meds today  States worsening pain recently  No a/e factors  States sees a doctor for this  Also c/o chronic leg weakness/knee weakness that is unchanged  Reports some discomfort with urinating and difficulty urinating  Denies fevers, chills, CP, SOB, N/V/D/C, numbness  IDDM- states hypoglycemic episode yesterday because he took his insulin and didn't eat  EMS arrived and pt declined transport to hospital   States he hasn't eaten yet today and last insulin was yesterday  States daily ETOH use- last drink 2 days ago and admits to feeling shaky  Denies history of withdrawal seizures  Does not want to quit or resources/rehab    Plan: 47 yo M with b/l flank pain- symptomatic management, shakiness- accucheck, abd labs/CT, UA, dispo accordingly               Prior to Admission Medications   Prescriptions Last Dose Informant Patient Reported? Taking?    Basaglar KwikPen 100 units/mL injection pen   No No   Sig: Inject 25 Units under the skin daily   Basaglar KwikPen 100 units/mL injection pen   No No   Sig: Inject 25 Units under the skin daily   Blood Glucose Monitoring Suppl (ACCU-CHEK GUIDE) w/Device KIT   No No   Sig: by Does not apply route 3 (three) times a day   Insulin Pen Needle (UNIFINE PENTIPS) 32G X 4 MM MISC   No No   Sig: Inject as directed daily   Insulin Pen Needle (Unifine Pentips) 32G X 4 MM MISC   No No   Sig: Inject as directed daily   Lancets (ACCU-CHEK MULTICLIX) lancets   No No   Sig: Check blood sugar 3 times a day   Lancets (accu-chek multiclix) lancets   No No   Sig: Check blood sugar 3 times a day   folic acid (FOLVITE) 1 mg tablet   No No   Sig: Take 1 tablet (1 mg total) by mouth daily   Patient not taking: Reported on 3/22/2020   glucagon (Glucagon Emergency) 1 MG injection   No No   Sig: Inject 1 mg under the skin once as needed for low blood sugar for up to 1 dose   glucose blood (Accu-Chek Guide) test strip   No No   Sig: Check blood sugar 3 times a day   lisinopril-hydrochlorothiazide (PRINZIDE,ZESTORETIC) 20-12 5 MG per tablet   No No   Sig: Take 1 tablet by mouth daily   Patient not taking: Reported on 8/15/2020   naproxen (EC NAPROSYN) 500 MG EC tablet   No No   Sig: Take 1 tablet (500 mg total) by mouth 2 (two) times a day with meals for 10 days   pantoprazole (PROTONIX) 40 mg tablet   No No   Sig: Take 1 tablet (40 mg total) by mouth daily in the early morning   Patient not taking: Reported on 8/15/2020   thiamine 100 MG tablet   No No   Sig: Take 1 tablet (100 mg total) by mouth daily   Patient not taking: Reported on 8/15/2020      Facility-Administered Medications: None       Past Medical History:   Diagnosis Date    Alcohol abuse     Chronic pancreatitis ([facility] [address] )     Diabetes mellitus ([facility] [address] )     Type 2    Pancreatitis     Paroxysmal A-fib (HCC)     Thrombocytopenia (HCC)        Past Surgical History:   Procedure Laterality Date    INCISION AND DRAINAGE OF WOUND N/A 8/16/2020    Procedure: INCISION AND DRAINAGE (I&D) HEAD/FACE;  Surgeon: Laith Lopez DMD;  [facility];  Service: Maxillofacial    IR BIOPSY BONE MARROW  2/7/2019    REMOVAL OF IMPACTED TOOTH - COMPLETELY BONY N/A 8/16/2020    Procedure: EXTRACTION TEETH MULTIPLE #2, 3;  Surgeon: Laith Lopez DMD;  [facility];  Service: Maxillofacial       Family History Problem Relation Age of Onset    Diabetes Mother     Hypertension Mother     Hyperlipidemia Father      I have reviewed and agree with the history as documented  E-Cigarette/Vaping     E-Cigarette/Vaping Substances     Social History     Tobacco Use    Smoking status: Former Smoker    Smokeless tobacco: Never Used   Substance Use Topics    Alcohol use: Yes    Drug use: Yes     Types: Cocaine       Review of Systems   Constitutional: Negative for chills, fever and unexpected weight change  HENT: Negative for ear pain, rhinorrhea and sore throat  Eyes: Negative for pain and visual disturbance  Respiratory: Negative for cough and shortness of breath  Cardiovascular: Negative for chest pain and leg swelling  Gastrointestinal: Negative for abdominal pain, constipation, diarrhea, nausea and vomiting  Endocrine: Negative for polydipsia, polyphagia and polyuria  Genitourinary: Positive for flank pain  Negative for dysuria, frequency, hematuria and urgency  Musculoskeletal: Positive for back pain  Negative for myalgias and neck pain  Skin: Negative for color change and rash  Allergic/Immunologic: Negative for environmental allergies and immunocompromised state  Neurological: Negative for dizziness, weakness, light-headedness, numbness and headaches  Hematological: Negative for adenopathy  Does not bruise/bleed easily  Psychiatric/Behavioral: Negative for agitation and confusion  All other systems reviewed and are negative  Physical Exam  Physical Exam  Vitals and nursing note reviewed  Constitutional:       General: He is not in acute distress  Appearance: He is well-developed  Comments: Resting comfortably   HENT:      Head: Normocephalic and atraumatic  Nose: Nose normal    Eyes:      Conjunctiva/sclera: Conjunctivae normal    Cardiovascular:      Rate and Rhythm: Normal rate and regular rhythm  Heart sounds: Normal heart sounds     Pulmonary:      Effort: Pulmonary effort is normal  No respiratory distress  Breath sounds: Normal breath sounds  No stridor  No wheezing or rales  Chest:      Chest wall: No tenderness  Abdominal:      General: There is no distension  Palpations: Abdomen is soft  Tenderness: There is no abdominal tenderness  There is no guarding or rebound  Comments: Back: no skin changes/rash, no midline ttp, ttp b/l flanks, no rebound/guarding, no abdominal ttp   Musculoskeletal:         General: No swelling or deformity  Cervical back: Normal range of motion and neck supple  Skin:     General: Skin is warm and dry  Findings: No rash  Neurological:      General: No focal deficit present  Mental Status: He is alert and oriented to person, place, and time  Sensory: No sensory deficit  Motor: No weakness or abnormal muscle tone  Coordination: Coordination normal       Gait: Gait normal       Deep Tendon Reflexes: Reflexes normal    Psychiatric:         Thought Content:  Thought content normal          Judgment: Judgment normal          Vital Signs  ED Triage Vitals   Temperature Pulse Respirations Blood Pressure SpO2   01/30/22 0832 01/30/22 0832 01/30/22 0832 01/30/22 0832 01/30/22 0832   97 8 °F (36 6 °C) 84 18 (!) 186/90 100 %      Temp Source Heart Rate Source Patient Position - Orthostatic VS BP Location FiO2 (%)   01/30/22 0832 01/30/22 0832 01/30/22 1007 01/30/22 1007 --   Oral Monitor Lying Right arm       Pain Score       01/30/22 1039       No Pain           Vitals:    01/30/22 1007 01/30/22 1039 01/30/22 1151 01/30/22 1421   BP: 169/78 (!) 175/88 151/85 170/85   Pulse: 78 74 64 84   Patient Position - Orthostatic VS: Lying Lying Lying Lying         Visual Acuity      ED Medications  Medications   lidocaine (LIDODERM) 5 % patch 1 patch (1 patch Topical Medication Applied 1/30/22 1048)   lactated ringers bolus 1,000 mL (0 mL Intravenous Stopped 1/30/22 1205)   diazepam (VALIUM) tablet 5 mg (5 mg Oral Given 1/30/22 1049)   acetaminophen (TYLENOL) tablet 975 mg (975 mg Oral Given 1/30/22 1048)   iohexol (OMNIPAQUE) 350 MG/ML injection (SINGLE-DOSE) 100 mL (100 mL Intravenous Given 1/30/22 1342)       Diagnostic Studies  Results Reviewed     Procedure Component Value Units Date/Time    Basic metabolic panel [[id number]]  (Abnormal) Collected: 01/30/22 1037    Lab Status: Final result Specimen: Blood from Arm, Left Updated: 01/30/22 1107     Sodium 139 mmol/L      Potassium 3 5 mmol/L      Chloride 98 mmol/L      CO2 32 mmol/L      ANION GAP 9 mmol/L      BUN 13 mg/dL      Creatinine 1 18 mg/dL      Glucose 283 mg/dL      Calcium 9 1 mg/dL      eGFR 71 ml/min/1 73sq m     Narrative:      [place] guidelines for Chronic Kidney Disease (CKD):     Stage 1 with normal or high GFR (GFR > 90 mL/min/1 73 square meters)    Stage 2 Mild CKD (GFR = 60-89 mL/min/1 73 square meters)    Stage 3A Moderate CKD (GFR = 45-59 mL/min/1 73 square meters)    Stage 3B Moderate CKD (GFR = 30-44 mL/min/1 73 square meters)    Stage 4 Severe CKD (GFR = 15-29 mL/min/1 73 square meters)    Stage 5 End Stage CKD (GFR <15 mL/min/1 73 square meters)  Note: GFR calculation is accurate only with a steady state creatinine    Lipase [[id number]]  (Abnormal) Collected: 01/30/22 1037    Lab Status: Final result Specimen: Blood from Arm, Left Updated: 01/30/22 1107     Lipase 14 u/L     Hepatic function panel [[id number]]  (Abnormal) Collected: 01/30/22 1037    Lab Status: Final result Specimen: Blood from Arm, Left Updated: 01/30/22 1107     Total Bilirubin 1 02 mg/dL      Bilirubin, Direct 0 21 mg/dL      Alkaline Phosphatase 85 U/L      AST 30 U/L      ALT 37 U/L      Total Protein 8 0 g/dL      Albumin 4 0 g/dL     Urine Microscopic [[id number]]  (Abnormal) Collected: 01/30/22 1035    Lab Status: Final result Specimen: Urine, Clean Catch Updated: 01/30/22 1106     RBC, UA None Seen /hpf      WBC, UA 1-2 /hpf Epithelial Cells Occasional /hpf      Bacteria, UA None Seen /hpf      Hyaline Casts, UA 0-1 /lpf     Blood gas, venous [[id number]]  (Abnormal) Collected: 01/30/22 1037    Lab Status: Final result Specimen: Blood from Arm, Left Updated: 01/30/22 1056     pH, Sami 7 343     pCO2, Sami 59 9 mm Hg      pO2, Sami 18 0 mm Hg      HCO3, Sami 31 8 mmol/L      Base Excess, Sami 4 1 mmol/L      O2 Content, Sami 6 0 ml/dL      O2 HGB, VENOUS 27 5 %     CBC and differential [[id number]]  (Abnormal) Collected: 01/30/22 1037    Lab Status: Final result Specimen: Blood from Arm, Left Updated: 01/30/22 1049     WBC 11 49 Thousand/uL      RBC 4 54 Million/uL      Hemoglobin 15 2 g/dL      Hematocrit 42 3 %      MCV 93 fL      MCH 33 5 pg      MCHC 35 9 g/dL      RDW 11 8 %      MPV 10 8 fL      Platelets 641 Thousands/uL      nRBC 0 /100 WBCs      Neutrophils Relative 76 %      Immat GRANS % 0 %      Lymphocytes Relative 16 %      Monocytes Relative 7 %      Eosinophils Relative 1 %      Basophils Relative 0 %      Neutrophils Absolute 8 61 Thousands/µL      Immature Grans Absolute 0 03 Thousand/uL      Lymphocytes Absolute 1 89 Thousands/µL      Monocytes Absolute 0 84 Thousand/µL      Eosinophils Absolute 0 10 Thousand/µL      Basophils Absolute 0 02 Thousands/µL     Urine Macroscopic, POC [[id number]]  (Abnormal) Collected: 01/30/22 1035    Lab Status: Final result Specimen: Urine Updated: 01/30/22 1036     Color, UA Yellow     Clarity, UA Clear     pH, UA 5 5     Leukocytes, UA Elevated glucose may cause decreased leukocyte values   See urine microscopic for [facility] result/     Nitrite, UA Negative     Protein,  (2+) mg/dl      Glucose, UA >=1000 (1%) mg/dl      Ketones, UA Negative mg/dl      Urobilinogen, UA 0 2 E U /dl      Bilirubin, UA Negative     Blood, UA Negative     Specific Gravity, UA >=1 030    Narrative:      CLINITEK RESULT    Fingerstick Glucose (POCT) [[id number]]  (Abnormal) Collected: 01/30/22 0950    Lab Status: Final result Updated: 01/30/22 0953     POC Glucose 358 mg/dl                  CT abdomen pelvis with contrast   ED Interpretation by Anmol Stanley DO (01/30 0401)   FINDINGS:     ABDOMEN     LOWER CHEST:  No clinically significant abnormality identified in the visualized lower chest      LIVER/BILIARY TREE:  Apparent hepatic hypoenhancement relative to expectation may suggest steatosis, though specificity of CT is decreased after contrast administration  No masses or biliary ductal dilatation  Overall liver size and contour are normal      GALLBLADDER:  No calcified gallstones  No pericholecystic inflammatory change      SPLEEN:  Unremarkable      PANCREAS:  Severe diffuse atrophy of the pancreas with parenchymal calcifications  Ex vacuo prominence of the main duct  Findings consistent with chronic pancreatitis and unchanged from prior  No suspicious masses      ADRENAL GLANDS:  Unremarkable      KIDNEYS/URETERS:  Unremarkable  No hydronephrosis      STOMACH AND BOWEL:  Large colonic stool burden extending to the rectum  No dilated or inflamed loops of small or large bowel  Stomach is normal for level distention      APPENDIX:  Not well demonstra   benjie  No evidence for acute appendicitis      ABDOMINOPELVIC CAVITY:  No ascites  No pneumoperitoneum  No lymphadenopathy      VESSELS:  Unremarkable for patient's age      PELVIS     REPRODUCTIVE ORGANS:  Unremarkable for patient's age      URINARY BLADDER:  Unremarkable      ABDOMINAL WALL/INGUINAL REGIONS:  Unremarkable      OSSEOUS STRUCTURES:  No acute fracture or destructive osseous lesion      IMPRESSION:     1   Large stool burden without obstruction or other acute abdominopelvic findings      2  Unchanged sequela of chronic pancreatitis  Final Result by Fawn Boone MD (01/30 4796)      1  Large stool burden without obstruction or other acute abdominopelvic findings  2   Unchanged sequela of chronic pancreatitis              Workstation performed: [id number]                    Procedures  Procedures         ED Course  ED Course as of 01/30/22 1606   Sun Jan 30, 2022   0949 EKG: NSR @ 66 bpm, normal axis, normal intervals, twi III, avF, v3-v4, flattening v5/v6   0955 POC Glucose(!): 358   1040 Ketones, UA: Negative   1043 POCT URINE PROTEIN(!): 100 (2+)   1116 Glucose, Random(!): 283   1116 Anion Gap: 9   1226 Pt was taken to CT scan, however his IV blew   1327 IV able to placed, going for CT scan now   1432 At discharge, pt saying he does not have insulin and needs a refill  He does not know his regimen  His pharmacy is closed  I called his doctor office answering service and they verified the med list I have, I will send refill for the insulin and the PCP office will reach out to pt for follow up                                             MDM  Number of Diagnoses or Management Options  Chronic alcoholism ([facility] [address] )  Chronic back pain  Chronic pancreatitis ([facility] [address] )  Hyperglycemia  Diagnosis management comments: 45 yo M presenting with acute on chronic back pain, unclear etiology, possibly MSK or chronic pancreatitis, no acute abnormality on workup  Discussed alcohol cessation with pt, he is not interested in resources  At discharge, pt requested refill on his insulin  He can't tell me his regimen and his pharmacy is currently closed   I called his PCP office who had same med list I have, so I gave refill to pt and PCP office will reach out for close f/u  Return precautions reviewed and pt expressed understanding with plan         Amount and/or Complexity of Data Reviewed  Clinical lab tests: ordered and reviewed  Tests in the radiology section of CPT®: reviewed and ordered  Tests in the medicine section of CPT®: ordered and reviewed  Review and summarize past medical records: yes  Independent visualization of images, tracings, or specimens: yes        Disposition  Final diagnoses:   Hyperglycemia   Chronic back pain   Chronic pancreatitis ([facility] )   Chronic alcoholism ([facility] )     Time reflects when diagnosis was documented in both MDM as applicable and the Disposition within this note     Time User Action Codes Description Comment    1/30/2022 11:38 AM Verla Balder A Add [R73 9] Hyperglycemia     1/30/2022  2:15 PM Sol Bis Add [M54 9,  G89 29] Chronic back pain     1/30/2022  2:15 PM Verla Balder A Add [K86 1] [facility] pancreatitis ([facility] )     1/30/2022  2:15 PM Sol Bis Add [F10 20] Chronic alcoholism ([facility] )     1/30/2022  2:26 PM Sol Bis Add [E11 65,  Z79 4] Type 2 diabetes mellitus with hyperglycemia, with long-term current use of insulin ([facility] )     1/30/2022  2:27 PM Sol Bis Modify [E11 65,  Z79 4] Type 2 diabetes mellitus with hyperglycemia, with long-term current use of insulin ([facility] )     1/30/2022  2:37 PM Chalino Benavides A Add [E11 9,  Z79 4] Type 2 diabetes mellitus without complication, with long-term current use of insulin [facility])       ED Disposition     ED Disposition Condition Date/Time Comment    Discharge Stable Sun Jan 30, 2022  2:14 PM [facility] discharge to home/self care              Follow-up Information     Follow up With Specialties Details Why Contact Info    Anthony Cleary PA-C Family Medicine, Physician Assistant   [address]  [address] [facility] [address]             Discharge Medication List as of 1/30/2022  2:16 PM      CONTINUE these medications which have NOT CHANGED    Details   Blood Glucose Monitoring Suppl (ACCU-CHEK GUIDE) w/Device KIT by Does not apply route 3 (three) times a day, Starting Thu 8/86/2731, Normal      folic acid (FOLVITE) 1 mg tablet Take 1 tablet (1 mg total) by mouth daily, Starting Thu 6/27/2019, Normal      glucagon (Glucagon Emergency) 1 MG injection Inject 1 mg under the skin once as needed for low blood sugar for up to 1 dose, Starting Thu 11/26/2020, Normal      glucose blood (Accu-Chek Guide) test strip Check blood sugar 3 times a day, Normal      lisinopril-hydrochlorothiazide (PRINZIDE,ZESTORETIC) 20-12 5 MG per tablet Take 1 tablet by mouth daily, Starting Fri 8/14/2020, Normal      naproxen (EC NAPROSYN) 500 MG EC tablet Take 1 tablet (500 mg total) by mouth 2 (two) times a day with meals for 10 days, Starting Tue 8/18/2020, Until Fri 8/28/2020, Normal      pantoprazole (PROTONIX) 40 mg tablet Take 1 tablet (40 mg total) by mouth daily in the early morning, Starting Thu 6/27/2019, Normal      thiamine 100 MG tablet Take 1 tablet (100 mg total) by mouth daily, Starting Thu 6/27/2019, Normal      Basaglar KwikPen 100 units/mL injection pen Inject 25 Units under the skin daily, Starting Wed 6/30/2021, Normal      Insulin Pen Needle (UNIFINE PENTIPS) 32G X 4 MM MISC Inject as directed daily, Starting Thu 6/27/2019, Normal      Lancets (ACCU-CHEK MULTICLIX) lancets Check blood sugar 3 times a day, Normal             No discharge procedures on file      PDMP Review       Value Time User    PDMP Reviewed  Yes 8/18/2020 11:39 AM Karin Lechuga MD          ED Provider  Electronically Signed by           Danna Dumont DO  01/30/22 3981

## 2022-01-30 NOTE — ED NOTES
Pt  Discharged by ED MD using , pt   Verbalized understanding and ambulated from Brooklyn Smith, RN  01/30/22 4645

## 2022-01-31 NOTE — TELEPHONE ENCOUNTER
01/31/22    Called pt and pt father picked-up  Explained to Pt father Josafat Davison ) the reason of my call, and pt father agreed / request to schedule pt appt / f/u from ed  Appt date: 02/04/22  Pt father requested a reminder letter to be sent to his house (same add on pt chart)  Letter was sent

## 2022-07-29 ENCOUNTER — APPOINTMENT (EMERGENCY)
Dept: RADIOLOGY | Facility: HOSPITAL | Age: 52
End: 2022-07-29
Payer: COMMERCIAL

## 2022-07-29 ENCOUNTER — HOSPITAL ENCOUNTER (OUTPATIENT)
Facility: HOSPITAL | Age: 52
Setting detail: OBSERVATION
Discharge: HOME | End: 2022-07-30
Attending: EMERGENCY MEDICINE | Admitting: STUDENT IN AN ORGANIZED HEALTH CARE EDUCATION/TRAINING PROGRAM
Payer: COMMERCIAL

## 2022-07-29 DIAGNOSIS — Z79.4 TYPE 2 DIABETES MELLITUS WITH HYPERGLYCEMIA, WITH LONG-TERM CURRENT USE OF INSULIN (HCC): ICD-10-CM

## 2022-07-29 DIAGNOSIS — I10 ESSENTIAL HYPERTENSION: ICD-10-CM

## 2022-07-29 DIAGNOSIS — E87.6 HYPOKALEMIA: ICD-10-CM

## 2022-07-29 DIAGNOSIS — E11.65 TYPE 2 DIABETES MELLITUS WITH HYPERGLYCEMIA, WITH LONG-TERM CURRENT USE OF INSULIN (HCC): ICD-10-CM

## 2022-07-29 DIAGNOSIS — E16.2 HYPOGLYCEMIA: Primary | ICD-10-CM

## 2022-07-29 DIAGNOSIS — I48.91 ATRIAL FIBRILLATION WITH RVR (HCC): ICD-10-CM

## 2022-07-29 PROBLEM — G93.40 ACUTE ENCEPHALOPATHY: Status: ACTIVE | Noted: 2019-02-04

## 2022-07-29 PROBLEM — R93.89 ABNORMAL CHEST X-RAY: Status: ACTIVE | Noted: 2022-07-29

## 2022-07-29 LAB
ALBUMIN SERPL BCP-MCNC: 4.1 G/DL (ref 3.5–5)
ALP SERPL-CCNC: 90 U/L (ref 46–116)
ALT SERPL W P-5'-P-CCNC: 54 U/L (ref 12–78)
ANION GAP SERPL CALCULATED.3IONS-SCNC: 10 MMOL/L (ref 4–13)
APAP SERPL-MCNC: <2 UG/ML (ref 10–20)
AST SERPL W P-5'-P-CCNC: 60 U/L (ref 5–45)
ATRIAL RATE: 129 BPM
BASOPHILS # BLD AUTO: 0.01 THOUSANDS/ΜL (ref 0–0.1)
BASOPHILS NFR BLD AUTO: 0 % (ref 0–1)
BILIRUB SERPL-MCNC: 0.24 MG/DL (ref 0.2–1)
BUN SERPL-MCNC: 19 MG/DL (ref 5–25)
CALCIUM SERPL-MCNC: 9.2 MG/DL (ref 8.3–10.1)
CHLORIDE SERPL-SCNC: 106 MMOL/L (ref 96–108)
CO2 SERPL-SCNC: 27 MMOL/L (ref 21–32)
CREAT SERPL-MCNC: 1.12 MG/DL (ref 0.6–1.3)
EOSINOPHIL # BLD AUTO: 0.13 THOUSAND/ΜL (ref 0–0.61)
EOSINOPHIL NFR BLD AUTO: 2 % (ref 0–6)
ERYTHROCYTE [DISTWIDTH] IN BLOOD BY AUTOMATED COUNT: 11.8 % (ref 11.6–15.1)
ETHANOL SERPL-MCNC: 20 MG/DL (ref 0–3)
GFR SERPL CREATININE-BSD FRML MDRD: 75 ML/MIN/1.73SQ M
GLUCOSE SERPL-MCNC: 107 MG/DL (ref 65–140)
GLUCOSE SERPL-MCNC: 124 MG/DL (ref 65–140)
GLUCOSE SERPL-MCNC: 151 MG/DL (ref 65–140)
GLUCOSE SERPL-MCNC: 251 MG/DL (ref 65–140)
GLUCOSE SERPL-MCNC: 43 MG/DL (ref 65–140)
GLUCOSE SERPL-MCNC: 58 MG/DL (ref 65–140)
GLUCOSE SERPL-MCNC: 61 MG/DL (ref 65–140)
GLUCOSE SERPL-MCNC: 71 MG/DL (ref 65–140)
GLUCOSE SERPL-MCNC: 84 MG/DL (ref 65–140)
GLUCOSE SERPL-MCNC: 95 MG/DL (ref 65–140)
GLUCOSE SERPL-MCNC: <20 MG/DL (ref 65–140)
HCT VFR BLD AUTO: 45.7 % (ref 36.5–49.3)
HGB BLD-MCNC: 15.6 G/DL (ref 12–17)
IMM GRANULOCYTES # BLD AUTO: 0.03 THOUSAND/UL (ref 0–0.2)
IMM GRANULOCYTES NFR BLD AUTO: 0 % (ref 0–2)
LYMPHOCYTES # BLD AUTO: 1.35 THOUSANDS/ΜL (ref 0.6–4.47)
LYMPHOCYTES NFR BLD AUTO: 19 % (ref 14–44)
MAGNESIUM SERPL-MCNC: 2.1 MG/DL (ref 1.6–2.6)
MCH RBC QN AUTO: 32.6 PG (ref 26.8–34.3)
MCHC RBC AUTO-ENTMCNC: 34.1 G/DL (ref 31.4–37.4)
MCV RBC AUTO: 96 FL (ref 82–98)
MONOCYTES # BLD AUTO: 0.45 THOUSAND/ΜL (ref 0.17–1.22)
MONOCYTES NFR BLD AUTO: 6 % (ref 4–12)
NEUTROPHILS # BLD AUTO: 5.12 THOUSANDS/ΜL (ref 1.85–7.62)
NEUTS SEG NFR BLD AUTO: 73 % (ref 43–75)
NRBC BLD AUTO-RTO: 0 /100 WBCS
PLATELET # BLD AUTO: 228 THOUSANDS/UL (ref 149–390)
PMV BLD AUTO: 10.3 FL (ref 8.9–12.7)
POTASSIUM SERPL-SCNC: 3.2 MMOL/L (ref 3.5–5.3)
PROT SERPL-MCNC: 8.1 G/DL (ref 6.4–8.4)
QRS AXIS: 62 DEGREES
QRSD INTERVAL: 90 MS
QT INTERVAL: 282 MS
QTC INTERVAL: 407 MS
RBC # BLD AUTO: 4.78 MILLION/UL (ref 3.88–5.62)
SALICYLATES SERPL-MCNC: <3 MG/DL (ref 3–20)
SODIUM SERPL-SCNC: 143 MMOL/L (ref 135–147)
T WAVE AXIS: 23 DEGREES
VENTRICULAR RATE: 125 BPM
WBC # BLD AUTO: 7.09 THOUSAND/UL (ref 4.31–10.16)

## 2022-07-29 PROCEDURE — 71046 X-RAY EXAM CHEST 2 VIEWS: CPT

## 2022-07-29 PROCEDURE — 36415 COLL VENOUS BLD VENIPUNCTURE: CPT

## 2022-07-29 PROCEDURE — 93010 ELECTROCARDIOGRAM REPORT: CPT | Performed by: INTERNAL MEDICINE

## 2022-07-29 PROCEDURE — 96376 TX/PRO/DX INJ SAME DRUG ADON: CPT

## 2022-07-29 PROCEDURE — 99291 CRITICAL CARE FIRST HOUR: CPT | Performed by: EMERGENCY MEDICINE

## 2022-07-29 PROCEDURE — 96375 TX/PRO/DX INJ NEW DRUG ADDON: CPT

## 2022-07-29 PROCEDURE — 96361 HYDRATE IV INFUSION ADD-ON: CPT

## 2022-07-29 PROCEDURE — 82948 REAGENT STRIP/BLOOD GLUCOSE: CPT

## 2022-07-29 PROCEDURE — 71045 X-RAY EXAM CHEST 1 VIEW: CPT

## 2022-07-29 PROCEDURE — 83735 ASSAY OF MAGNESIUM: CPT

## 2022-07-29 PROCEDURE — 80053 COMPREHEN METABOLIC PANEL: CPT

## 2022-07-29 PROCEDURE — 80143 DRUG ASSAY ACETAMINOPHEN: CPT

## 2022-07-29 PROCEDURE — 80179 DRUG ASSAY SALICYLATE: CPT

## 2022-07-29 PROCEDURE — 99291 CRITICAL CARE FIRST HOUR: CPT

## 2022-07-29 PROCEDURE — 99220 PR INITIAL OBSERVATION CARE/DAY 70 MINUTES: CPT | Performed by: STUDENT IN AN ORGANIZED HEALTH CARE EDUCATION/TRAINING PROGRAM

## 2022-07-29 PROCEDURE — 82077 ASSAY SPEC XCP UR&BREATH IA: CPT

## 2022-07-29 PROCEDURE — 96365 THER/PROPH/DIAG IV INF INIT: CPT

## 2022-07-29 PROCEDURE — 85025 COMPLETE CBC W/AUTO DIFF WBC: CPT

## 2022-07-29 PROCEDURE — 93005 ELECTROCARDIOGRAM TRACING: CPT

## 2022-07-29 RX ORDER — ENOXAPARIN SODIUM 100 MG/ML
40 INJECTION SUBCUTANEOUS DAILY
Status: DISCONTINUED | OUTPATIENT
Start: 2022-07-29 | End: 2022-07-30 | Stop reason: HOSPADM

## 2022-07-29 RX ORDER — MAGNESIUM HYDROXIDE/ALUMINUM HYDROXICE/SIMETHICONE 120; 1200; 1200 MG/30ML; MG/30ML; MG/30ML
30 SUSPENSION ORAL EVERY 6 HOURS PRN
Status: DISCONTINUED | OUTPATIENT
Start: 2022-07-29 | End: 2022-07-30 | Stop reason: HOSPADM

## 2022-07-29 RX ORDER — ACETAMINOPHEN 325 MG/1
650 TABLET ORAL EVERY 6 HOURS PRN
Status: DISCONTINUED | OUTPATIENT
Start: 2022-07-29 | End: 2022-07-30 | Stop reason: HOSPADM

## 2022-07-29 RX ORDER — DEXTROSE MONOHYDRATE 25 G/50ML
25 INJECTION, SOLUTION INTRAVENOUS ONCE
Status: DISCONTINUED | OUTPATIENT
Start: 2022-07-29 | End: 2022-07-29

## 2022-07-29 RX ORDER — DEXTROSE MONOHYDRATE 25 G/50ML
25 INJECTION, SOLUTION INTRAVENOUS ONCE
Status: COMPLETED | OUTPATIENT
Start: 2022-07-29 | End: 2022-07-29

## 2022-07-29 RX ORDER — DILTIAZEM HYDROCHLORIDE 5 MG/ML
15 INJECTION INTRAVENOUS ONCE
Status: COMPLETED | OUTPATIENT
Start: 2022-07-29 | End: 2022-07-29

## 2022-07-29 RX ORDER — DEXTROSE, SODIUM CHLORIDE, SODIUM LACTATE, POTASSIUM CHLORIDE, AND CALCIUM CHLORIDE 5; .6; .31; .03; .02 G/100ML; G/100ML; G/100ML; G/100ML; G/100ML
75 INJECTION, SOLUTION INTRAVENOUS CONTINUOUS
Status: DISCONTINUED | OUTPATIENT
Start: 2022-07-29 | End: 2022-07-30

## 2022-07-29 RX ORDER — DEXTROSE, SODIUM CHLORIDE, SODIUM LACTATE, POTASSIUM CHLORIDE, AND CALCIUM CHLORIDE 5; .6; .31; .03; .02 G/100ML; G/100ML; G/100ML; G/100ML; G/100ML
100 INJECTION, SOLUTION INTRAVENOUS CONTINUOUS
Status: DISCONTINUED | OUTPATIENT
Start: 2022-07-29 | End: 2022-07-29

## 2022-07-29 RX ORDER — POTASSIUM CHLORIDE 20 MEQ/1
40 TABLET, EXTENDED RELEASE ORAL ONCE
Status: COMPLETED | OUTPATIENT
Start: 2022-07-29 | End: 2022-07-29

## 2022-07-29 RX ORDER — DEXTROSE MONOHYDRATE 25 G/50ML
INJECTION, SOLUTION INTRAVENOUS
Status: COMPLETED
Start: 2022-07-29 | End: 2022-07-29

## 2022-07-29 RX ORDER — FOLIC ACID 1 MG/1
1 TABLET ORAL DAILY
Status: DISCONTINUED | OUTPATIENT
Start: 2022-07-29 | End: 2022-07-30 | Stop reason: HOSPADM

## 2022-07-29 RX ORDER — ONDANSETRON 2 MG/ML
4 INJECTION INTRAMUSCULAR; INTRAVENOUS EVERY 6 HOURS PRN
Status: DISCONTINUED | OUTPATIENT
Start: 2022-07-29 | End: 2022-07-30 | Stop reason: HOSPADM

## 2022-07-29 RX ORDER — AMLODIPINE BESYLATE 5 MG/1
5 TABLET ORAL DAILY
Status: DISCONTINUED | OUTPATIENT
Start: 2022-07-29 | End: 2022-07-30 | Stop reason: HOSPADM

## 2022-07-29 RX ORDER — LANOLIN ALCOHOL/MO/W.PET/CERES
100 CREAM (GRAM) TOPICAL DAILY
Status: DISCONTINUED | OUTPATIENT
Start: 2022-07-29 | End: 2022-07-30 | Stop reason: HOSPADM

## 2022-07-29 RX ORDER — INSULIN LISPRO 100 [IU]/ML
1-5 INJECTION, SOLUTION INTRAVENOUS; SUBCUTANEOUS
Status: DISCONTINUED | OUTPATIENT
Start: 2022-07-29 | End: 2022-07-30 | Stop reason: HOSPADM

## 2022-07-29 RX ORDER — DEXTROSE MONOHYDRATE 100 MG/ML
100 INJECTION, SOLUTION INTRAVENOUS CONTINUOUS
Status: DISCONTINUED | OUTPATIENT
Start: 2022-07-29 | End: 2022-07-29

## 2022-07-29 RX ORDER — LISINOPRIL 20 MG/1
20 TABLET ORAL DAILY
Status: DISCONTINUED | OUTPATIENT
Start: 2022-07-29 | End: 2022-07-30 | Stop reason: HOSPADM

## 2022-07-29 RX ADMIN — DEXTROSE MONOHYDRATE 25 G: 25 INJECTION, SOLUTION INTRAVENOUS at 10:27

## 2022-07-29 RX ADMIN — FOLIC ACID 1 MG: 1 TABLET ORAL at 12:32

## 2022-07-29 RX ADMIN — LISINOPRIL 20 MG: 20 TABLET ORAL at 17:29

## 2022-07-29 RX ADMIN — DEXTROSE, SODIUM CHLORIDE, SODIUM LACTATE, POTASSIUM CHLORIDE, AND CALCIUM CHLORIDE 100 ML/HR: 5; .6; .31; .03; .02 INJECTION, SOLUTION INTRAVENOUS at 08:47

## 2022-07-29 RX ADMIN — AMLODIPINE BESYLATE 5 MG: 5 TABLET ORAL at 17:29

## 2022-07-29 RX ADMIN — Medication 12.5 MG: at 12:32

## 2022-07-29 RX ADMIN — DEXTROSE MONOHYDRATE 25 G: 25 INJECTION, SOLUTION INTRAVENOUS at 09:13

## 2022-07-29 RX ADMIN — Medication 12.5 MG: at 20:41

## 2022-07-29 RX ADMIN — ENOXAPARIN SODIUM 40 MG: 40 INJECTION SUBCUTANEOUS at 12:32

## 2022-07-29 RX ADMIN — SODIUM CHLORIDE, SODIUM LACTATE, POTASSIUM CHLORIDE, AND CALCIUM CHLORIDE 1000 ML: .6; .31; .03; .02 INJECTION, SOLUTION INTRAVENOUS at 08:17

## 2022-07-29 RX ADMIN — POTASSIUM CHLORIDE 40 MEQ: 1500 TABLET, EXTENDED RELEASE ORAL at 10:04

## 2022-07-29 RX ADMIN — DILTIAZEM HYDROCHLORIDE 15 MG: 5 INJECTION INTRAVENOUS at 10:11

## 2022-07-29 RX ADMIN — Medication 1 TABLET: at 12:32

## 2022-07-29 RX ADMIN — DEXTROSE, SODIUM CHLORIDE, SODIUM LACTATE, POTASSIUM CHLORIDE, AND CALCIUM CHLORIDE 75 ML/HR: 5; .6; .31; .03; .02 INJECTION, SOLUTION INTRAVENOUS at 15:12

## 2022-07-29 RX ADMIN — THIAMINE HCL TAB 100 MG 100 MG: 100 TAB at 12:32

## 2022-07-29 NOTE — ASSESSMENT & PLAN NOTE
Lab Results   Component Value Date    HGBA1C 9 3 (A) 08/14/2020       Recent Labs     07/29/22  0909 07/29/22  0931 07/29/22  1022 07/29/22  1109   POCGLU <20* 124 43* 251*     Home insulin regimen of Lantus 25 units bedtime  Hold insulin setting of hypoglycemia  Accu-Cheks, sliding scale insulin

## 2022-07-29 NOTE — ED PROVIDER NOTES
History  Chief Complaint   Patient presents with    Hypoglycemia - Symptomatic     Ems called pt found unresponsive by parents at home  Hx of diabetes  On scenes pt BG 33  IM glucagon given  Pt BG went up to 77  Pt lethargic and cold      HPI    Prior to Admission Medications   Prescriptions Last Dose Informant Patient Reported? Taking?    Basaglar KwikPen 100 units/mL injection pen   No Yes   Sig: Inject 25 Units under the skin daily   Blood Glucose Monitoring Suppl (ACCU-CHEK GUIDE) w/Device KIT   No Yes   Sig: by Does not apply route 3 (three) times a day   Insulin Pen Needle (Unifine Pentips) 32G X 4 MM MISC   No Yes   Sig: Inject as directed daily   Lancets (accu-chek multiclix) lancets   No Yes   Sig: Check blood sugar 3 times a day   folic acid (FOLVITE) 1 mg tablet Not Taking at Unknown time  No No   Sig: Take 1 tablet (1 mg total) by mouth daily   Patient not taking: No sig reported   glucagon (Glucagon Emergency) 1 MG injection   No Yes   Sig: Inject 1 mg under the skin once as needed for low blood sugar for up to 1 dose   glucose blood (Accu-Chek Guide) test strip   No Yes   Sig: Check blood sugar 3 times a day   lisinopril-hydrochlorothiazide (PRINZIDE,ZESTORETIC) 20-12 5 MG per tablet Not Taking at Unknown time  No No   Sig: Take 1 tablet by mouth daily   Patient not taking: No sig reported   naproxen (EC NAPROSYN) 500 MG EC tablet   No No   Sig: Take 1 tablet (500 mg total) by mouth 2 (two) times a day with meals for 10 days   pantoprazole (PROTONIX) 40 mg tablet Not Taking at Unknown time  No No   Sig: Take 1 tablet (40 mg total) by mouth daily in the early morning   Patient not taking: No sig reported   thiamine 100 MG tablet Not Taking at Unknown time  No No   Sig: Take 1 tablet (100 mg total) by mouth daily   Patient not taking: No sig reported      Facility-Administered Medications: None       Past Medical History:   Diagnosis Date    Alcohol abuse     [facility] pancreatitis ([facility] [address] )     Diabetes mellitus ([facility] [address] )     Type 2    Pancreatitis     Paroxysmal A-fib (HCC)     Thrombocytopenia (HCC)        Past Surgical History:   Procedure Laterality Date    INCISION AND DRAINAGE OF WOUND N/A 8/16/2020    Procedure: INCISION AND DRAINAGE (I&D) HEAD/FACE;  Surgeon: Toña Rodriguez DMD;  [facility]: [facility];  Service: Maxillofacial    IR BIOPSY BONE MARROW  2/7/2019    REMOVAL OF IMPACTED TOOTH - COMPLETELY BONY N/A 8/16/2020    Procedure: EXTRACTION TEETH MULTIPLE #2, 3;  Surgeon: Toña Rodriguez DMD;  Location: [facility];  Service: Maxillofacial       Family History   Problem Relation Age of Onset    Diabetes Mother     Hypertension Mother     Hyperlipidemia Father      I have reviewed and agree with the history as documented  E-Cigarette/Vaping     E-Cigarette/Vaping Substances     Social History     Tobacco Use    Smoking status: Former Smoker    Smokeless tobacco: Never Used   Substance Use Topics    Alcohol use:  Yes    Drug use: Yes     Types: Cocaine       Review of Systems    Physical Exam  Physical Exam    Vital Signs  ED Triage Vitals   Temperature Pulse Respirations Blood Pressure SpO2   07/29/22 0721 07/29/22 0711 07/29/22 0727 07/29/22 0711 07/29/22 0711   (!) 93 °F (33 9 °C) 68 12 (!) 205/114 100 %      Temp Source Heart Rate Source Patient Position - Orthostatic VS BP Location FiO2 (%)   07/29/22 0721 07/29/22 0711 07/29/22 0711 07/29/22 0711 --   Rectal Monitor Lying Right arm       Pain Score       07/29/22 1025       No Pain           Vitals:    07/29/22 1025 07/29/22 1145 07/29/22 1232 07/29/22 1252   BP: 133/58 (!) 204/96  (!) 179/90   Pulse: 92 100 (!) 116 98   Patient Position - Orthostatic VS: Lying Lying  Lying         Visual Acuity      ED Medications  Medications   dextrose 5 % in lactated Ringer's infusion (75 mL/hr Intravenous New Bag 7/29/22 1512)   metoprolol tartrate (LOPRESSOR) partial tablet 12 5 mg (12 5 mg Oral Given 7/29/22 1232)   thiamine tablet 100 mg (100 mg Oral Given 8/76/86 6114)   folic acid (FOLVITE) tablet 1 mg (1 mg Oral Given 7/29/22 1232)   multivitamin-minerals (CENTRUM) tablet 1 tablet (1 tablet Oral Given 7/29/22 1232)   insulin lispro (HumaLOG) 100 units/mL subcutaneous injection 1-5 Units (1 Units Subcutaneous Not Given 7/29/22 1310)   acetaminophen (TYLENOL) tablet 650 mg (has no administration in time range)   ondansetron (ZOFRAN) injection 4 mg (has no administration in time range)   aluminum-magnesium hydroxide-simethicone (MYLANTA) oral suspension 30 mL (has no administration in time range)   enoxaparin (LOVENOX) subcutaneous injection 40 mg (40 mg Subcutaneous Given 7/29/22 1232)   lisinopril (ZESTRIL) tablet 20 mg (has no administration in time range)   amLODIPine (NORVASC) tablet 5 mg (has no administration in time range)   glucagon (FOR EMS ONLY) (GLUCAGEN) injection 1 mg (0 mg Does not apply Given to EMS 7/29/22 0718)   lactated ringers bolus 1,000 mL (0 mL Intravenous Stopped 7/29/22 0851)   potassium chloride (K-DUR,KLOR-CON) CR tablet 40 mEq (40 mEq Oral Given 7/29/22 1004)   dextrose 50 % IV solution 25 g ( Intravenous Canceled Entry 7/29/22 0920)   diltiazem (CARDIZEM) injection 15 mg (15 mg Intravenous Given 7/29/22 1011)   dextrose 50 % IV solution 25 g (25 g Intravenous Given 7/29/22 1027)       Diagnostic Studies  Results Reviewed     Procedure Component Value Units Date/Time    Fingerstick Glucose (POCT) [[id number]]  (Abnormal) Collected: 07/29/22 1252    Lab Status: Final result Updated: 07/29/22 1254     POC Glucose 151 mg/dl     Fingerstick Glucose (POCT) [[id number]]  (Abnormal) Collected: 07/29/22 1109    Lab Status: Final result Updated: 07/29/22 1110     POC Glucose 251 mg/dl     Fingerstick Glucose (POCT) [[id number]]  (Abnormal) Collected: 07/29/22 1022    Lab Status: Final result Updated: 07/29/22 1023     POC Glucose 43 mg/dl     Fingerstick Glucose (POCT) [[id number]]  (Normal) Collected: 07/29/22 0931    Lab Status: Final result Updated: 07/29/22 0932     POC Glucose 124 mg/dl     Fingerstick Glucose (POCT) [[id number]]  (Abnormal) Collected: 07/29/22 0909    Lab Status: Final result Updated: 07/29/22 0910     POC Glucose <20 mg/dl     Acetaminophen level-If concentration is detectable, please discuss with medical  on call   [[id number]]  (Abnormal) Collected: 07/29/22 0819    Lab Status: Final result Specimen: Blood from Arm, Right Updated: 07/29/22 0904     Acetaminophen Level <2 ug/mL     Comprehensive metabolic panel [[id number]]  (Abnormal) Collected: 07/29/22 0819    Lab Status: Final result Specimen: Blood from Arm, Right Updated: 07/29/22 0843     Sodium 143 mmol/L      Potassium 3 2 mmol/L      Chloride 106 mmol/L      CO2 27 mmol/L      ANION GAP 10 mmol/L      BUN 19 mg/dL      Creatinine 1 12 mg/dL      Glucose 61 mg/dL      Calcium 9 2 mg/dL      AST 60 U/L      ALT 54 U/L      Alkaline Phosphatase 90 U/L      Total Protein 8 1 g/dL      Albumin 4 1 g/dL      Total Bilirubin 0 24 mg/dL      eGFR 75 ml/min/1 73sq m     Narrative:      [place] guidelines for Chronic Kidney Disease (CKD):     Stage 1 with normal or high GFR (GFR > 90 mL/min/1 73 square meters)    Stage 2 Mild CKD (GFR = 60-89 mL/min/1 73 square meters)    Stage 3A Moderate CKD (GFR = 45-59 mL/min/1 73 square meters)    Stage 3B Moderate CKD (GFR = 30-44 mL/min/1 73 square meters)    Stage 4 Severe CKD (GFR = 15-29 mL/min/1 73 square meters)    Stage 5 End Stage CKD (GFR <15 mL/min/1 73 square meters)  Note: GFR calculation is accurate only with a steady state creatinine    Magnesium [[id number]]  (Normal) Collected: 07/29/22 0819    Lab Status: Final result Specimen: Blood from Arm, Right Updated: 07/29/22 0843     Magnesium 2 1 mg/dL     Salicylate level [[id number]]  (Abnormal) Collected: 07/29/22 0819    Lab Status: Final result Specimen: Blood from Arm, Right Updated: 85/41/47 9930     Salicylate Lvl <3 mg/dL Fingerstick Glucose (POCT) [[id number]]  (Abnormal) Collected: 07/29/22 0836    Lab Status: Final result Updated: 07/29/22 0837     POC Glucose 58 mg/dl     Ethanol [[id number]]  (Abnormal) Collected: 07/29/22 0801    Lab Status: Final result Specimen: Blood from Arm, Left Updated: 07/29/22 0821     Ethanol Lvl 20 mg/dL     CBC and differential [[id number]] Collected: 07/29/22 0758    Lab Status: Final result Specimen: Blood from Arm, Left Updated: 07/29/22 0804     WBC 7 09 Thousand/uL      RBC 4 78 Million/uL      Hemoglobin 15 6 g/dL      Hematocrit 45 7 %      MCV 96 fL      MCH 32 6 pg      MCHC 34 1 g/dL      RDW 11 8 %      MPV 10 3 fL      Platelets 276 Thousands/uL      nRBC 0 /100 WBCs      Neutrophils Relative 73 %      Immat GRANS % 0 %      Lymphocytes Relative 19 %      Monocytes Relative 6 %      Eosinophils Relative 2 %      Basophils Relative 0 %      Neutrophils Absolute 5 12 Thousands/µL      Immature Grans Absolute 0 03 Thousand/uL      Lymphocytes Absolute 1 35 Thousands/µL      Monocytes Absolute 0 45 Thousand/µL      Eosinophils Absolute 0 13 Thousand/µL      Basophils Absolute 0 01 Thousands/µL     Fingerstick Glucose (POCT) [[id number]]  (Normal) Collected: 07/29/22 0716    Lab Status: Final result Updated: 07/29/22 0717     POC Glucose 107 mg/dl                  XR chest 2 views   Final Result by Syd Monreal MD (07/29 8403)      No acute cardiopulmonary disease  Workstation performed: [id number]         XR chest portable ICU   Final Result by Adama Clark MD (07/29 3330)      Right lower lung opacity and possible left retrocardiac opacity can represent atelectasis and/or infection in the appropriate clinical setting  An upright 2 view chest radiograph can be considered for further assessment              Workstation performed: [id number]                    Procedures  CriticalCare Time  Performed by: Giana Mosquera DO  Authorized by: Giana Mosquera DO     Critical care provider statement:     Critical care time (minutes):  30    Critical care time was exclusive of:  Separately billable procedures and treating other patients and teaching time    Critical care was necessary to treat or prevent imminent or life-threatening deterioration of the following conditions:  Endocrine crisis    Critical care was time spent personally by me on the following activities:  Blood draw for specimens, obtaining history from patient or surrogate, development of treatment plan with patient or surrogate, discussions with consultants, evaluation of patient's response to treatment, examination of patient, interpretation of cardiac output measurements, ordering and performing treatments and interventions, ordering and review of laboratory studies, ordering and review of radiographic studies, re-evaluation of patient's condition and review of old charts    I assumed direction of critical care for this patient from another provider in my specialty: no               ED Course                                             MDM    Disposition  Final diagnoses:   Hypoglycemia   Atrial fibrillation [facility] ([facility] )   Hypokalemia     Time reflects when diagnosis was documented in both MDM as applicable and the Disposition within this note     Time User Action Codes Description Comment    7/29/2022 11:00 AM Garrel Betters Add [E16 2] Hypoglycemia     7/29/2022 11:00 AM Garrel Betters Add [I48 91] Atrial fibrillation with RVR ([facility] [address] )     7/29/2022 11:00 AM Garrel Betters Add [E87 6] Hypokalemia       ED Disposition     ED Disposition   Admit    Condition   Stable    Date/Time   Fri Jul 29, 2022 11:01 AM    Comment   Case was discussed with EUGENIO and the patient's admission status was agreed to be Admission Status: observation status to the service of Dr Ricco Sierra              Follow-up Information    None         Current Discharge Medication List      CONTINUE these medications which have NOT CHANGED    Details   Basaglar KwikPen 100 units/mL injection pen Inject 25 Units under the skin daily  Qty: 15 mL, Refills: 0    Associated Diagnoses: Type 2 diabetes mellitus with hyperglycemia, with long-term current use of insulin ([facility])      Blood Glucose Monitoring Suppl (ACCU-CHEK GUIDE) w/Device KIT by Does not apply route 3 (three) times a day  Qty: 1 kit, Refills: 0    Associated Diagnoses: Type 2 diabetes mellitus with hyperglycemia, with long-term current use of insulin ([facility])      glucagon (Glucagon Emergency) 1 MG injection Inject 1 mg under the skin once as needed for low blood sugar for up to 1 dose  Qty: 1 kit, Refills: 0    Associated Diagnoses: Hypoglycemia      glucose blood (Accu-Chek Guide) test strip Check blood sugar 3 times a day  Qty: 100 each, Refills: 5    Associated Diagnoses: Type 2 diabetes mellitus with hyperglycemia, with long-term current use of insulin ([facility])      Insulin Pen Needle (Unifine Pentips) 32G X 4 MM MISC Inject as directed daily  Qty: 100 each, Refills: 0    Associated Diagnoses: Type 2 diabetes mellitus without complication, with long-term current use of insulin ([facility])      Lancets (accu-chek multiclix) lancets Check blood sugar 3 times a day  Qty: 100 each, Refills: 0    Associated Diagnoses: Type 2 diabetes mellitus with hyperglycemia, with long-term current use of insulin ([facility])      folic acid (FOLVITE) 1 mg tablet Take 1 tablet (1 mg total) by mouth daily  Qty: 30 tablet, Refills: 1    Associated Diagnoses: Alcohol abuse      lisinopril-hydrochlorothiazide (PRINZIDE,ZESTORETIC) 20-12 5 MG per tablet Take 1 tablet by mouth daily  Qty: 90 tablet, Refills: 1    Associated Diagnoses: Essential hypertension      naproxen (EC NAPROSYN) 500 MG EC tablet Take 1 tablet (500 mg total) by mouth 2 (two) times a day with meals for 10 days  Qty: 20 tablet, Refills: 0    Associated Diagnoses: Facial cellulitis      pantoprazole (PROTONIX) 40 mg tablet Take 1 tablet (40 mg total) by mouth daily in the early morning  Qty: 30 tablet, Refills: 1    Associated Diagnoses: Alcohol abuse      thiamine 100 MG tablet Take 1 tablet (100 mg total) by mouth daily  Qty: 30 tablet, Refills: 1    Associated Diagnoses: Alcohol abuse             No discharge procedures on file      PDMP Review       Value Time User    PDMP Reviewed  Yes 8/18/2020 11:39 AM Caleb Gaytan MD          ED Provider  Electronically Signed by           Giana Mosquera DO  07/29/22 1714

## 2022-07-29 NOTE — PLAN OF CARE
Problem: Potential for Falls  Goal: Patient will remain free of falls  Description: INTERVENTIONS:  - Educate patient/family on patient safety including physical limitations  - Instruct patient to call for assistance with activity   - Consult OT/PT to assist with strengthening/mobility   - Keep Call bell within reach  - Keep bed low and locked with side rails adjusted as appropriate  - Keep care items and personal belongings within reach  - Initiate and maintain comfort rounds  - Make Fall Risk Sign visible to staff  - Offer Toileting every  Hours, in advance of need  - Initiate/Maintain alarm  - Obtain necessary fall risk management equipment:   - Apply yellow socks and bracelet for high fall risk patients  - Consider moving patient to room near nurses station  Outcome: Progressing     Problem: PAIN - ADULT  Goal: Verbalizes/displays adequate comfort level or baseline comfort level  Description: Interventions:  - Encourage patient to monitor pain and request assistance  - Assess pain using appropriate pain scale  - Administer analgesics based on type and severity of pain and evaluate response  - Implement non-pharmacological measures as appropriate and evaluate response  - Consider cultural and social influences on pain and pain management  - Notify physician/advanced practitioner if interventions unsuccessful or patient reports new pain  Outcome: Progressing     Problem: INFECTION - ADULT  Goal: Absence or prevention of progression during hospitalization  Description: INTERVENTIONS:  - Assess and monitor for signs and symptoms of infection  - Monitor lab/diagnostic results  - Monitor all insertion sites, i e  indwelling lines, tubes, and drains  - Monitor endotracheal if appropriate and nasal secretions for changes in amount and color  - [place] appropriate cooling/warming therapies per order  - Administer medications as ordered  - Instruct and encourage patient and family to use good hand hygiene technique  - Identify and instruct in appropriate isolation precautions for identified infection/condition  Outcome: Progressing  Goal: Absence of fever/infection during neutropenic period  Description: INTERVENTIONS:  - Monitor WBC    Outcome: Progressing     Problem: SAFETY ADULT  Goal: Maintain or return to baseline ADL function  Description: INTERVENTIONS:  -  Assess patient's ability to carry out ADLs; assess patient's baseline for ADL function and identify physical deficits which impact ability to perform ADLs (bathing, care of mouth/teeth, toileting, grooming, dressing, etc )  - Assess/evaluate cause of self-care deficits   - Assess range of motion  - Assess patient's mobility; develop plan if impaired  - Assess patient's need for assistive devices and provide as appropriate  - Encourage maximum independence but intervene and supervise when necessary  - Involve family in performance of ADLs  - Assess for home care needs following discharge   - Consider OT consult to assist with ADL evaluation and planning for discharge  - Provide patient education as appropriate  Outcome: Progressing  Goal: Maintains/Returns to pre admission functional level  Description: INTERVENTIONS:  - Perform BMAT or MOVE assessment daily    - Set and communicate daily mobility goal to care team and patient/family/caregiver  - Collaborate with rehabilitation services on mobility goals if consulted  - Perform Range of Motion  times a day  - Reposition patient every  hours    - Dangle patient  times a day  - Stand patient  times a day  - Ambulate patient  times a day  - Out of bed to chair  times a day   - Out of bed for meals times a day  - Out of bed for toileting  - Record patient progress and toleration of activity level   Outcome: Progressing     Problem: DISCHARGE PLANNING  Goal: Discharge to home or other facility with appropriate resources  Description: INTERVENTIONS:  - Identify barriers to discharge w/patient and caregiver  - Arrange for needed discharge resources and transportation as appropriate  - Identify discharge learning needs (meds, wound care, etc )  - Arrange for interpretive services to assist at discharge as needed  - Refer to Case Management Department for coordinating discharge planning if the patient needs post-hospital services based on physician/advanced practitioner order or complex needs related to functional status, cognitive ability, or social support system  Outcome: Progressing     Problem: Knowledge Deficit  Goal: Patient/family/caregiver demonstrates understanding of disease process, treatment plan, medications, and discharge instructions  Description: Complete learning assessment and assess knowledge base    Interventions:  - Provide teaching at level of understanding  - Provide teaching via preferred learning methods  Outcome: Progressing     Problem: METABOLIC, FLUID AND ELECTROLYTES - ADULT  Goal: Electrolytes maintained within normal limits  Description: INTERVENTIONS:  - Monitor labs and assess patient for signs and symptoms of electrolyte imbalances  - Administer electrolyte replacement as ordered  - Monitor response to electrolyte replacements, including repeat lab results as appropriate  - Instruct patient on fluid and nutrition as appropriate  Outcome: Progressing  Goal: Fluid balance maintained  Description: INTERVENTIONS:  - Monitor labs   - Monitor I/O and WT  - Instruct patient on fluid and nutrition as appropriate  - Assess for signs & symptoms of volume excess or deficit  Outcome: Progressing  Goal: Glucose maintained within target range  Description: INTERVENTIONS:  - Monitor Blood Glucose as ordered  - Assess for signs and symptoms of hyperglycemia and hypoglycemia  - Administer ordered medications to maintain glucose within target range  - Assess nutritional intake and initiate nutrition service referral as needed  Outcome: Progressing

## 2022-07-29 NOTE — H&P
[address] [place] [address] 1970, 46 y o  male MRN: [id number]  Unit/Bed#: ED 32 Encounter: [id number]  Primary Care Provider: ARIAEN Ruff   Date and time admitted to hospital: 7/29/2022  7:06 AM    * Acute encephalopathy  Assessment & Plan  Presented with altered mental status, unresponsive at home  Blood glucose found in the 20s in the ED, treated with D5 fluids, multiple amps of D50  Blood glucose improved, patient more alert and awake appears to be at baseline  Chest x-ray shows possible right lower lobe opacity, infiltrates  Denies any infectious etiology, fevers, chills, cough  Monitor off antibiotics  Continue D5 75 cc/hour  Monitor Accu-Cheks q 2 hours, if blood glucose remains elevated after 3-4 checks, may discontinue  Positive alcohol levels of 20, continue wants to drink alcohol, not interested in quitting  Will monitor on observation, possible discharge home tomorrow      Abnormal chest x-ray  Assessment & Plan  Chest x-ray shows right lower lung opacity  Patient without fevers, chills, cough or chest pains  Possible aspiration in the setting of hypoglycemia, altered mental status  Will continue monitor off antibiotics    Bilateral hearing loss  Assessment & Plan  Patient is hard of hearing, requires his father did translate Polish with  given low volume    Paroxysmal A-fib ([facility] [address] )  Assessment & Plan  History of proximal AFib, has brief episode of AFib RVR on admission  ChadsVasc of 2, discussed anticoagulation, patient with history of noncompliance    Uncomplicated alcohol dependence ([address] )  Assessment & Plan  Patient continues to drink significant amounts of alcohol daily  History of alcoholic induced pancreatitis  Continue CIWA protocol  Patient not wanting to quit alcohol at this time    Type 2 diabetes mellitus with hyperglycemia, with long-term current use of insulin [facility])  Assessment & Plan  Lab Results   Component Value Date    HGBA1C 9 3 (A) 08/14/2020       Recent Labs     07/29/22  0909 07/29/22  0931 07/29/22  1022 07/29/22  1109   POCGLU <20* 124 43* 251*     Home insulin regimen of Lantus 25 units bedtime  Hold insulin setting of hypoglycemia  Accu-Cheks, sliding scale insulin    Essential hypertension  Assessment & Plan  History of hypertension, noncompliant with HCTZ, lisinopril  Given AFib RVR in the ED, resolved with diltiazem, start on Lopressor 25 b i d   Monitor blood pressure      VTE Prophylaxis: Enoxaparin (Lovenox)  / sequential compression device   Code Status:   POLST: There is no POLST form on file for this patient (pre-hospital)  Discussion with family: patient    Anticipated Length of Stay:  Patient will be admitted on an Observation basis with an anticipated length of stay of 2 midnights  Justification for Hospital Stay:  Monitor blood glucose, IV fluids    Total Time for Visit, including Counseling / Coordination of Care: 45 minutes  Greater than 50% of this total time spent on direct patient counseling and coordination of care  Chief Complaint:  Found unresponsive    History of Present Illness:    Traci Anne is a 46 y o  male who presents with altered mental status  Patient past medical history of alcohol abuse, paroxysmal AFib, hypertension and type 2 diabetes on insulin  His brought in by EMS, found to be altered and unresponsive  Blood glucose was checked found to be 50s over 20s  He had multiple treatments of D50 with no significant improvement  Patient was started on IV fluids, D5 with LR  His symptoms slowly improved  He states that he is currently feeling well, his normal state of health  Denies any palpitations, chest pain, shortness breast, fevers, cough  He does remember what happening, states that he did take his Lantus 25 units at night, he did eat but did had poor appetite  He does continue to drink a significant amount of alcohol daily, unclear of amount    Had episode of AFib RVR, treated with diltiazem IV  Discussed with patient and father who was assisting and translating with  as patient is hard of hearing  States that he is feeling a lot better back to his baseline  Review of Systems:    Review of Systems   Constitutional: Negative for activity change, appetite change, chills and fever  HENT: Negative for congestion, sinus pressure and sore throat  Eyes: Negative for pain and discharge  Respiratory: Negative for cough, shortness of breath and wheezing  Cardiovascular: Negative for chest pain, palpitations and leg swelling  Gastrointestinal: Negative for abdominal distention, abdominal pain, blood in stool, diarrhea, nausea and vomiting  Endocrine: Negative for cold intolerance, heat intolerance, polydipsia, polyphagia and polyuria  Genitourinary: Negative for dysuria, frequency, hematuria and urgency  Musculoskeletal: Negative for arthralgias and back pain  Skin: Negative for color change and pallor  Neurological: Negative for seizures, syncope and weakness  Psychiatric/Behavioral: Negative for agitation and confusion  Past Medical and Surgical History:     Past Medical History:   Diagnosis Date    Alcohol abuse     Chronic pancreatitis ([facility] [address] )     Diabetes mellitus ([facility] )     Type 2    Pancreatitis     Paroxysmal A-fib (HCC)     Thrombocytopenia (HCC)        Past Surgical History:   Procedure Laterality Date    INCISION AND DRAINAGE OF WOUND N/A 8/16/2020    Procedure: INCISION AND DRAINAGE (I&D) HEAD/FACE;  Surgeon: Susie Salazar DMD;  Location: [facility];  Service: Maxillofacial    IR BIOPSY BONE MARROW  2/7/2019    REMOVAL OF IMPACTED TOOTH - COMPLETELY BONY N/A 8/16/2020    Procedure: EXTRACTION TEETH MULTIPLE #2, 3;  Surgeon: Susie Salazar DMD;  Location: [facility];  Service: Maxillofacial       Meds/Allergies:    Prior to Admission medications    Medication Sig Start Date End Date Taking?  Authorizing Provider   Doug Mix units/mL injection pen Inject 25 Units under the skin daily 1/30/22  Yes Sindhu Benavides, DO   Blood Glucose Monitoring Suppl (ACCU-CHEK GUIDE) w/Device KIT by Does not apply route 3 (three) times a day 6/27/19  Yes Anthony Cleary PA-C   glucagon (Glucagon Emergency) 1 MG injection Inject 1 mg under the skin once as needed for low blood sugar for up to 1 dose 11/26/20  Yes Sarita Martini MD   glucose blood (Accu-Chek Guide) test strip Check blood sugar 3 times a day 8/14/20  Yes Anthony Cleary PA-C   Insulin Pen Needle (Unifine Pentips) 32G X 4 MM MISC Inject as directed daily 1/30/22 7/29/22 Yes Sindhu Benavides, DO   Lancets (accu-chek multiclix) lancets Check blood sugar 3 times a day 1/30/22  Yes Sindhu Benavides, DO   folic acid (FOLVITE) 1 mg tablet Take 1 tablet (1 mg total) by mouth daily  Patient not taking: No sig reported 6/27/19   Anthony Cleary PA-C   lisinopril-hydrochlorothiazide (PRINZIDE,ZESTORETIC) 20-12 5 MG per tablet Take 1 tablet by mouth daily  Patient not taking: No sig reported 8/14/20   Anthony Cleary PA-C   naproxen (EC NAPROSYN) 500 MG EC tablet Take 1 tablet (500 mg total) by mouth 2 (two) times a day with meals for 10 days 8/18/20 8/28/20  Ace Wilkerson MD   pantoprazole (PROTONIX) 40 mg tablet Take 1 tablet (40 mg total) by mouth daily in the early morning  Patient not taking: No sig reported 6/27/19   VIRGINIA HolleyC   thiamine 100 MG tablet Take 1 tablet (100 mg total) by mouth daily  Patient not taking: No sig reported 6/27/19   Anthony Cleary PA-C     I have reviewed home medications with patient personally      Allergies: No Known Allergies    Social History:     Marital Status: Single   Occupation:   Patient Pre-hospital Living Situation:  Home  Patient Pre-hospital Level of Mobility:  Ambulatory  Patient Pre-hospital Diet Restrictions:  None  Substance Use History:   Social History     Substance and Sexual Activity   Alcohol Use Yes     Social History     Tobacco Use   Smoking Status Former Smoker Smokeless Tobacco Never Used     Social History     Substance and Sexual Activity   Drug Use Yes    Types: Cocaine       Family History:    Family History   Problem Relation Age of Onset    Diabetes Mother     Hypertension Mother     Hyperlipidemia Father        Physical Exam:     Vitals:   Blood Pressure: 133/58 (07/29/22 1025)  Pulse: 92 (07/29/22 1025)  Temperature: (!) 97 4 °F (36 3 °C) (07/29/22 1030)  Temp Source: Oral (07/29/22 1030)  Respirations: 16 (07/29/22 1025)  SpO2: 100 % (07/29/22 1025)    Physical Exam  Vitals and nursing note reviewed  Constitutional:       Appearance: Normal appearance  He is normal weight  HENT:      Head: Normocephalic and atraumatic  Ears:      Comments: Hard of hearing  Eyes:      General: No scleral icterus  Conjunctiva/sclera: Conjunctivae normal    Cardiovascular:      Rate and Rhythm: Tachycardia present  Rhythm irregular  Heart sounds: Normal heart sounds  Pulmonary:      Breath sounds: Normal breath sounds  No wheezing or rhonchi  Abdominal:      General: Bowel sounds are normal  There is no distension  Palpations: Abdomen is soft  Tenderness: There is no abdominal tenderness  Musculoskeletal:         General: No swelling  Normal range of motion  Right lower leg: No edema  Left lower leg: No edema  Skin:     General: Skin is warm and dry  Neurological:      General: No focal deficit present  Mental Status: He is alert  Mental status is at baseline  Additional Data:     Lab Results: I have personally reviewed pertinent reports        Results from last 7 days   Lab Units 07/29/22  0758   WBC Thousand/uL 7 09   HEMOGLOBIN g/dL 15 6   HEMATOCRIT % 45 7   PLATELETS Thousands/uL 228   NEUTROS PCT % 73   LYMPHS PCT % 19   MONOS PCT % 6   EOS PCT % 2     Results from last 7 days   Lab Units 07/29/22  0819   SODIUM mmol/L 143   POTASSIUM mmol/L 3 2*   CHLORIDE mmol/L 106   CO2 mmol/L 27   BUN mg/dL 19 CREATININE mg/dL 1 12   ANION GAP mmol/L 10   CALCIUM mg/dL 9 2   ALBUMIN g/dL 4 1   TOTAL BILIRUBIN mg/dL 0 24   ALK PHOS U/L 90   ALT U/L 54   AST U/L 60*   GLUCOSE RANDOM mg/dL 61*         Results from last 7 days   Lab Units 07/29/22  1109 07/29/22  1022 07/29/22  0931 07/29/22  0909 07/29/22  0836 07/29/22  0716   POC GLUCOSE mg/dl 251* 43* 124 <20* 58* 107               Imaging: I have personally reviewed pertinent reports  XR chest 2 views   Final Result by Denny Eng MD (07/29 4524)      No acute cardiopulmonary disease  Workstation performed: [id number]         XR chest portable ICU   Final Result by Rock Linda MD (07/29 4265)      Right lower lung opacity and possible left retrocardiac opacity can represent atelectasis and/or infection in the appropriate clinical setting  An upright 2 view chest radiograph can be considered for further assessment  Workstation performed: [id number]             EKG, Pathology, and Other Studies Reviewed on Admission:   · EKG: Afib RVR    Allscripts / Epic Records Reviewed: Yes     ** Please Note: This note has been constructed using a voice recognition system   **

## 2022-07-29 NOTE — ASSESSMENT & PLAN NOTE
History of proximal AFib, has brief episode of AFib RVR on admission  ChadsVasc of 2, discussed anticoagulation, patient with history of noncompliance

## 2022-07-29 NOTE — ED NOTES
Called the unit and spoke to Health system giving RN heads up about pt blood glucose and how he has been trending      Anthony Plummer RN  07/29/22 [id number]

## 2022-07-29 NOTE — ASSESSMENT & PLAN NOTE
History of hypertension, noncompliant with HCTZ, lisinopril  Given AFib RVR in the ED, resolved with diltiazem, start on Lopressor 25 b i d   Monitor blood pressure

## 2022-07-29 NOTE — ASSESSMENT & PLAN NOTE
Presented with altered mental status, unresponsive at home  Blood glucose found in the 20s in the ED, treated with D5 fluids, multiple amps of D50  Blood glucose improved, patient more alert and awake appears to be at baseline  Chest x-ray shows possible right lower lobe opacity, infiltrates  Denies any infectious etiology, fevers, chills, cough  Monitor off antibiotics  Continue D5 75 cc/hour  Monitor Accu-Cheks q 2 hours, if blood glucose remains elevated after 3-4 checks, may discontinue  Positive alcohol levels of 20, continue wants to drink alcohol, not interested in quitting  Will monitor on observation, possible discharge home tomorrow

## 2022-07-29 NOTE — ED PROVIDER NOTES
History  Chief Complaint   Patient presents with    Hypoglycemia - Symptomatic     Ems called pt found unresponsive by parents at home  Hx of diabetes  On scenes pt BG 33  IM glucagon given  Pt BG went up to 77  Pt lethargic and cold      80-year-old man with relevant PMH alcohol abuse disorder, CKD, HTN, paroxysmal a fib, and T2DM found unresponsive at home  His mother called 46, and he was given 1 mg glucagon IM due to poor IV access  He does not know what happened this morning  He says he took his insulin yesterday morning and none since  The last thing he remembers was drinking last night and then waking up this morning at the hospital  He drank an nonquantifiable amount of rum and is unsure how much he had  He denies any drug use  He currently is feeling well now  He denies SI and HI  His father said his mother found him unresponsive at home and his sugar was low  He says he was out drinking  He says his son took his insulin yesterday afternoon   [id number]          Prior to Admission Medications   Prescriptions Last Dose Informant Patient Reported? Taking?    Basaglar KwikPen 100 units/mL injection pen   No Yes   Sig: Inject 25 Units under the skin daily   Blood Glucose Monitoring Suppl (ACCU-CHEK GUIDE) w/Device KIT   No Yes   Sig: by Does not apply route 3 (three) times a day   Insulin Pen Needle (Unifine Pentips) 32G X 4 MM MISC   No Yes   Sig: Inject as directed daily   Lancets (accu-chek multiclix) lancets   No Yes   Sig: Check blood sugar 3 times a day   folic acid (FOLVITE) 1 mg tablet Not Taking at Unknown time  No No   Sig: Take 1 tablet (1 mg total) by mouth daily   Patient not taking: No sig reported   glucagon (Glucagon Emergency) 1 MG injection   No Yes   Sig: Inject 1 mg under the skin once as needed for low blood sugar for up to 1 dose   glucose blood (Accu-Chek Guide) test strip   No Yes   Sig: Check blood sugar 3 times a day   lisinopril-hydrochlorothiazide (PRINZIDE,ZESTORETIC) 20-12 5 MG per tablet Not Taking at Unknown time  No No   Sig: Take 1 tablet by mouth daily   Patient not taking: No sig reported   naproxen (EC NAPROSYN) 500 MG EC tablet   No No   Sig: Take 1 tablet (500 mg total) by mouth 2 (two) times a day with meals for 10 days   pantoprazole (PROTONIX) 40 mg tablet Not Taking at Unknown time  No No   Sig: Take 1 tablet (40 mg total) by mouth daily in the early morning   Patient not taking: No sig reported   thiamine 100 MG tablet Not Taking at Unknown time  No No   Sig: Take 1 tablet (100 mg total) by mouth daily   Patient not taking: No sig reported      Facility-Administered Medications: None       Past Medical History:   Diagnosis Date    Alcohol abuse     [facility] pancreatitis ([facility] [address] [facility]     Diabetes mellitus ([facility] [address] )     Type 2    Pancreatitis     Paroxysmal A-fib (HCC)     Thrombocytopenia (HCC)        Past Surgical History:   Procedure Laterality Date    INCISION AND DRAINAGE OF WOUND N/A 8/16/2020    Procedure: INCISION AND DRAINAGE (I&D) HEAD/FACE;  Surgeon: Laith Lopez DMD;  Location: [facility];  Service: Maxillofacial    IR BIOPSY BONE MARROW  2/7/2019    REMOVAL OF IMPACTED TOOTH - COMPLETELY BONY N/A 8/16/2020    Procedure: EXTRACTION TEETH MULTIPLE #2, 3;  Surgeon: Laith Lopez DMD;  Location: [facility];  Service: Maxillofacial       Family History   Problem Relation Age of Onset    Diabetes Mother     Hypertension Mother     Hyperlipidemia Father      I have reviewed and agree with the history as documented  E-Cigarette/Vaping     E-Cigarette/Vaping Substances     Social History     Tobacco Use    Smoking status: Former Smoker    Smokeless tobacco: Never Used   Substance Use Topics    Alcohol use: Yes    Drug use: Yes     Types: Cocaine        Review of Systems   Constitutional: Negative for chills and fever  HENT: Negative for ear pain and sore throat  Eyes: Negative for pain and visual disturbance     Respiratory: Negative for cough and shortness of breath  Cardiovascular: Negative for chest pain and palpitations  Gastrointestinal: Negative for abdominal pain, constipation, diarrhea and vomiting  Genitourinary: Negative for dysuria and hematuria  Musculoskeletal: Negative for arthralgias and back pain  Skin: Negative for color change and rash  Neurological: Positive for syncope  Negative for seizures and light-headedness  Psychiatric/Behavioral: Negative for agitation and confusion  Physical Exam  ED Triage Vitals   Temperature Pulse Respirations Blood Pressure SpO2   07/29/22 0721 07/29/22 0711 07/29/22 0727 07/29/22 0711 07/29/22 0711   (!) 93 °F (33 9 °C) 68 12 (!) 205/114 100 %      Temp Source Heart Rate Source Patient Position - Orthostatic VS BP Location FiO2 (%)   07/29/22 0721 07/29/22 0711 07/29/22 0711 07/29/22 0711 --   Rectal Monitor Lying Right arm       Pain Score       07/29/22 1025       No Pain             Orthostatic Vital Signs  Vitals:    07/29/22 1025 07/29/22 1145 07/29/22 1232 07/29/22 1252   BP: 133/58 (!) 204/96  (!) 179/90   Pulse: 92 100 (!) 116 98   Patient Position - Orthostatic VS: Lying Lying  Lying       Physical Exam  Vitals and nursing note reviewed  Constitutional:       General: He is not in acute distress  Appearance: Normal appearance  He is well-developed  Comments: Hard of hearing   HENT:      Head: Normocephalic and atraumatic  Right Ear: External ear normal       Left Ear: External ear normal    Cardiovascular:      Rate and Rhythm: Tachycardia present  Rhythm irregular  Pulmonary:      Effort: Pulmonary effort is normal  No respiratory distress  Breath sounds: Normal breath sounds  Abdominal:      Palpations: Abdomen is soft  Tenderness: There is no abdominal tenderness  There is no guarding or rebound  Musculoskeletal:         General: No swelling or tenderness  Normal range of motion  Cervical back: Normal range of motion and neck supple  Skin:     General: Skin is warm and dry  Neurological:      General: No focal deficit present  Mental Status: He is alert     Psychiatric:         Mood and Affect: Mood normal          Behavior: Behavior normal          ED Medications  Medications   dextrose 5 % in lactated Ringer's infusion (75 mL/hr Intravenous Rate/Dose Change 7/29/22 1210)   metoprolol tartrate (LOPRESSOR) partial tablet 12 5 mg (12 5 mg Oral Given 7/29/22 1232)   thiamine tablet 100 mg (100 mg Oral Given 4/27/89 8095)   folic acid (FOLVITE) tablet 1 mg (1 mg Oral Given 7/29/22 1232)   multivitamin-minerals (CENTRUM) tablet 1 tablet (1 tablet Oral Given 7/29/22 1232)   insulin lispro (HumaLOG) 100 units/mL subcutaneous injection 1-5 Units (1 Units Subcutaneous Not Given 7/29/22 1310)   acetaminophen (TYLENOL) tablet 650 mg (has no administration in time range)   ondansetron (ZOFRAN) injection 4 mg (has no administration in time range)   aluminum-magnesium hydroxide-simethicone (MYLANTA) oral suspension 30 mL (has no administration in time range)   enoxaparin (LOVENOX) subcutaneous injection 40 mg (40 mg Subcutaneous Given 7/29/22 1232)   glucagon (FOR EMS ONLY) (GLUCAGEN) injection 1 mg (0 mg Does not apply Given to EMS 7/29/22 0718)   lactated ringers bolus 1,000 mL (0 mL Intravenous Stopped 7/29/22 0851)   potassium chloride (K-DUR,KLOR-CON) CR tablet 40 mEq (40 mEq Oral Given 7/29/22 1004)   dextrose 50 % IV solution 25 g ( Intravenous Canceled Entry 7/29/22 0920)   diltiazem (CARDIZEM) injection 15 mg (15 mg Intravenous Given 7/29/22 1011)   dextrose 50 % IV solution 25 g (25 g Intravenous Given 7/29/22 1027)       Diagnostic Studies  Results Reviewed     Procedure Component Value Units Date/Time    Fingerstick Glucose (POCT) [[id number]]  (Abnormal) Collected: 07/29/22 1252    Lab Status: Final result Updated: 07/29/22 1254     POC Glucose 151 mg/dl     Fingerstick Glucose (POCT) [[id number]]  (Abnormal) Collected: 07/29/22 1109 Lab Status: Final result Updated: 07/29/22 1110     POC Glucose 251 mg/dl     Fingerstick Glucose (POCT) [[id number]]  (Abnormal) Collected: 07/29/22 1022    Lab Status: Final result Updated: 07/29/22 1023     POC Glucose 43 mg/dl     Fingerstick Glucose (POCT) [[id number]]  (Normal) Collected: 07/29/22 0931    Lab Status: Final result Updated: 07/29/22 0932     POC Glucose 124 mg/dl     Fingerstick Glucose (POCT) [[id number]]  (Abnormal) Collected: 07/29/22 0909    Lab Status: Final result Updated: 07/29/22 0910     POC Glucose <20 mg/dl     Acetaminophen level-If concentration is detectable, please discuss with medical  on call   [[id number]]  (Abnormal) Collected: 07/29/22 0819    Lab Status: Final result Specimen: Blood from Arm, Right Updated: 07/29/22 0904     Acetaminophen Level <2 ug/mL     Comprehensive metabolic panel [[id number]]  (Abnormal) Collected: 07/29/22 0819    Lab Status: Final result Specimen: Blood from Arm, Right Updated: 07/29/22 0843     Sodium 143 mmol/L      Potassium 3 2 mmol/L      Chloride 106 mmol/L      CO2 27 mmol/L      ANION GAP 10 mmol/L      BUN 19 mg/dL      Creatinine 1 12 mg/dL      Glucose 61 mg/dL      Calcium 9 2 mg/dL      AST 60 U/L      ALT 54 U/L      Alkaline Phosphatase 90 U/L      Total Protein 8 1 g/dL      Albumin 4 1 g/dL      Total Bilirubin 0 24 mg/dL      eGFR 75 ml/min/1 73sq m     Narrative:      Rosa guidelines for Chronic Kidney Disease (CKD):     Stage 1 with normal or high GFR (GFR > 90 mL/min/1 73 square meters)    Stage 2 Mild CKD (GFR = 60-89 mL/min/1 73 square meters)    Stage 3A Moderate CKD (GFR = 45-59 mL/min/1 73 square meters)    Stage 3B Moderate CKD (GFR = 30-44 mL/min/1 73 square meters)    Stage 4 Severe CKD (GFR = 15-29 mL/min/1 73 square meters)    Stage 5 End Stage CKD (GFR <15 mL/min/1 73 square meters)  Note: GFR calculation is accurate only with a steady state creatinine    Magnesium [[id number]] (Normal) Collected: 07/29/22 0819    Lab Status: Final result Specimen: Blood from Arm, Right Updated: 07/29/22 0843     Magnesium 2 1 mg/dL     Salicylate level [[id number]]  (Abnormal) Collected: 07/29/22 0819    Lab Status: Final result Specimen: Blood from Arm, Right Updated: 22/74/35 5004     Salicylate Lvl <3 mg/dL     Fingerstick Glucose (POCT) [[id number]]  (Abnormal) Collected: 07/29/22 0836    Lab Status: Final result Updated: 07/29/22 0837     POC Glucose 58 mg/dl     Ethanol [[id number]]  (Abnormal) Collected: 07/29/22 0801    Lab Status: Final result Specimen: Blood from Arm, Left Updated: 07/29/22 0821     Ethanol Lvl 20 mg/dL     CBC and differential [[id number]] Collected: 07/29/22 0758    Lab Status: Final result Specimen: Blood from Arm, Left Updated: 07/29/22 0804     WBC 7 09 Thousand/uL      RBC 4 78 Million/uL      Hemoglobin 15 6 g/dL      Hematocrit 45 7 %      MCV 96 fL      MCH 32 6 pg      MCHC 34 1 g/dL      RDW 11 8 %      MPV 10 3 fL      Platelets 862 Thousands/uL      nRBC 0 /100 WBCs      Neutrophils Relative 73 %      Immat GRANS % 0 %      Lymphocytes Relative 19 %      Monocytes Relative 6 %      Eosinophils Relative 2 %      Basophils Relative 0 %      Neutrophils Absolute 5 12 Thousands/µL      Immature Grans Absolute 0 03 Thousand/uL      Lymphocytes Absolute 1 35 Thousands/µL      Monocytes Absolute 0 45 Thousand/µL      Eosinophils Absolute 0 13 Thousand/µL      Basophils Absolute 0 01 Thousands/µL     Fingerstick Glucose (POCT) [[id number]]  (Normal) Collected: 07/29/22 0716    Lab Status: Final result Updated: 07/29/22 0717     POC Glucose 107 mg/dl                  XR chest 2 views   Final Result by Jose Grimes MD (07/29 9327)      No acute cardiopulmonary disease                    Workstation performed: [id number]         XR chest portable ICU   Final Result by Maricela Harada, MD (07/29 8324)      Right lower lung opacity and possible left retrocardiac opacity can represent atelectasis and/or infection in the appropriate clinical setting  An upright 2 view chest radiograph can be considered for further assessment  Workstation performed: [id number]               Procedures  Procedures      ED Course  ED Course as of 07/29/22 1324   Fri Jul 29, 2022   0738 POC Glucose: 107  S/p glucagon via EMS for poor IV access   0801 This ECG was interpreted by me  The ECG demonstrates a fib, normal intervals, normal axis, normal QRS, no acute ST changes present  0839 POC Glucose(!): 58  Will start D5LR infusion   0918 POC Glucose(!!): <20  Will give amp of d50   0935 XR chest portable ICU  Will add on 2 view   1008 Patient awake and eating  HR into 140-160  Patient hemodynamically stable  Will give 15 mg diltiazem and recommend admission  1027 POC Glucose(!): 43  Will give D50         MDM  Number of Diagnoses or Management Options  Atrial fibrillation with RVR (HCC)  Hypoglycemia  Hypokalemia  Diagnosis management comments: Presenting with hypoglycemia  Unsure when patient last took his insulin  Either yesterday morning or last evening  However, patient was drinking heavily last night, and it is possible he took additional insulin then  He denies taking any this morning  However, he was found unresponsive by family  He has had little food intake in the last day  Patient had 2 episodes of hypoglycemia here that responded to an amp of D50 each time  He was started on a D5 LR drip at 100 mL/hour  He was eating lunch during the 2nd event and still became hyperglycemic  Patient also presenting in AFib with RVR, likely secondary to binge drinking  Apparently he has a history of this as per chart, but he denies knowing about an irregular rhythm  Did respond to a bolus of diltiazem  Will recommend admission for glucose monitoring and AFib RVR management  Patient in agreement with plan and questions were answered       Portions or all of this note were generated using voice recognition software  Occasional wrong word or "sound a like" substitutions may have occurred due to the inherent limitations of voice recognition software  Please interpret any errors within the intended context of the whole sentence or idea  Disposition  Final diagnoses:   Hypoglycemia   Atrial fibrillation with RVR (HCC)   Hypokalemia     Time reflects when diagnosis was documented in both MDM as applicable and the Disposition within this note     Time User Action Codes Description Comment    7/29/2022 11:00 AM Amye Round Add [E16 2] Hypoglycemia     7/29/2022 11:00 AM Amye Round Add [I48 91] Atrial fibrillation with RVR ([address] )     7/29/2022 11:00 AM Amye Round Add [E87 6] Hypokalemia       ED Disposition     ED Disposition   Admit    Condition   Stable    Date/Time   Fri Jul 29, 2022 11:01 AM    Comment   Case was discussed with EUGENIO and the patient's admission status was agreed to be Admission Status: observation status to the service of Dr Antonietta Ryan              Follow-up Information    None         Current Discharge Medication List      CONTINUE these medications which have NOT CHANGED    Details   Basaglar KwikPen 100 units/mL injection pen Inject 25 Units under the skin daily  Qty: 15 mL, Refills: 0    Associated Diagnoses: Type 2 diabetes mellitus with hyperglycemia, with long-term current use of insulin ([facility])      Blood Glucose Monitoring Suppl (ACCU-CHEK GUIDE) w/Device KIT by Does not apply route 3 (three) times a day  Qty: 1 kit, Refills: 0    Associated Diagnoses: Type 2 diabetes mellitus with hyperglycemia, with long-term current use of insulin ([facility])      glucagon (Glucagon Emergency) 1 MG injection Inject 1 mg under the skin once as needed for low blood sugar for up to 1 dose  Qty: 1 kit, Refills: 0    Associated Diagnoses: Hypoglycemia      glucose blood (Accu-Chek Guide) test strip Check blood sugar 3 times a day  Qty: 100 each, Refills: 5    Associated Diagnoses: Type 2 diabetes mellitus with hyperglycemia, with long-term current use of insulin ([facility])      Insulin Pen Needle (Unifine Pentips) 32G X 4 MM MISC Inject as directed daily  Qty: 100 each, Refills: 0    Associated Diagnoses: Type 2 diabetes mellitus without complication, with long-term current use of insulin ([facility])      Lancets (accu-chek multiclix) lancets Check blood sugar 3 times a day  Qty: 100 each, Refills: 0    Associated Diagnoses: Type 2 diabetes mellitus with hyperglycemia, with long-term current use of insulin ([facility])      folic acid (FOLVITE) 1 mg tablet Take 1 tablet (1 mg total) by mouth daily  Qty: 30 tablet, Refills: 1    Associated Diagnoses: Alcohol abuse      lisinopril-hydrochlorothiazide (PRINZIDE,ZESTORETIC) 20-12 5 MG per tablet Take 1 tablet by mouth daily  Qty: 90 tablet, Refills: 1    Associated Diagnoses: Essential hypertension      naproxen (EC NAPROSYN) 500 MG EC tablet Take 1 tablet (500 mg total) by mouth 2 (two) times a day with meals for 10 days  Qty: 20 tablet, Refills: 0    Associated Diagnoses: Facial cellulitis      pantoprazole (PROTONIX) 40 mg tablet Take 1 tablet (40 mg total) by mouth daily in the early morning  Qty: 30 tablet, Refills: 1    Associated Diagnoses: Alcohol abuse      thiamine 100 MG tablet Take 1 tablet (100 mg total) by mouth daily  Qty: 30 tablet, Refills: 1    Associated Diagnoses: Alcohol abuse           No discharge procedures on file  PDMP Review       Value Time User    PDMP Reviewed  Yes 8/18/2020 11:39 AM Zeina Griffin MD           ED Provider  Attending physically available and evaluated Traci Anne  JUANITO managed the patient along with the ED Attending      Electronically Signed by         Yvan Martinez MD  07/29/22 9166

## 2022-07-29 NOTE — ASSESSMENT & PLAN NOTE
Chest x-ray shows right lower lung opacity  Patient without fevers, chills, cough or chest pains  Possible aspiration in the setting of hypoglycemia, altered mental status  Will continue monitor off antibiotics

## 2022-07-29 NOTE — ED NOTES
Utilized  [id number] - notified pt his sugar keeps dropping  Pt stated he feels okay at this time  Pt alert and oriented x4  Breakfast order taken   Pt stated he is hungry      Nigel Kaye RN  07/29/22 Minerva Berger RN  07/29/22 9482

## 2022-07-29 NOTE — ASSESSMENT & PLAN NOTE
Patient continues to drink significant amounts of alcohol daily  History of alcoholic induced pancreatitis  Continue CIWA protocol  Patient not wanting to quit alcohol at this time

## 2022-07-29 NOTE — ED NOTES
Pt finished eating breakfast  This writer noticed pt to be sweaty again   POC glucose being checked at this time      Cherise Kaplan RN  07/29/22 8844

## 2022-07-30 VITALS
TEMPERATURE: 97.1 F | RESPIRATION RATE: 18 BRPM | HEART RATE: 84 BPM | SYSTOLIC BLOOD PRESSURE: 134 MMHG | DIASTOLIC BLOOD PRESSURE: 86 MMHG | OXYGEN SATURATION: 99 %

## 2022-07-30 LAB
ANION GAP SERPL CALCULATED.3IONS-SCNC: 8 MMOL/L (ref 4–13)
BASOPHILS # BLD AUTO: 0.02 THOUSANDS/ΜL (ref 0–0.1)
BASOPHILS NFR BLD AUTO: 0 % (ref 0–1)
BUN SERPL-MCNC: 16 MG/DL (ref 5–25)
CALCIUM SERPL-MCNC: 8 MG/DL (ref 8.3–10.1)
CHLORIDE SERPL-SCNC: 105 MMOL/L (ref 96–108)
CO2 SERPL-SCNC: 24 MMOL/L (ref 21–32)
CREAT SERPL-MCNC: 1 MG/DL (ref 0.6–1.3)
EOSINOPHIL # BLD AUTO: 0.25 THOUSAND/ΜL (ref 0–0.61)
EOSINOPHIL NFR BLD AUTO: 3 % (ref 0–6)
ERYTHROCYTE [DISTWIDTH] IN BLOOD BY AUTOMATED COUNT: 11.8 % (ref 11.6–15.1)
EST. AVERAGE GLUCOSE BLD GHB EST-MCNC: 146 MG/DL
GFR SERPL CREATININE-BSD FRML MDRD: 86 ML/MIN/1.73SQ M
GLUCOSE SERPL-MCNC: 142 MG/DL (ref 65–140)
GLUCOSE SERPL-MCNC: 191 MG/DL (ref 65–140)
GLUCOSE SERPL-MCNC: 198 MG/DL (ref 65–140)
HBA1C MFR BLD: 6.7 %
HCT VFR BLD AUTO: 39.7 % (ref 36.5–49.3)
HGB BLD-MCNC: 13.9 G/DL (ref 12–17)
IMM GRANULOCYTES # BLD AUTO: 0.01 THOUSAND/UL (ref 0–0.2)
IMM GRANULOCYTES NFR BLD AUTO: 0 % (ref 0–2)
LYMPHOCYTES # BLD AUTO: 3.41 THOUSANDS/ΜL (ref 0.6–4.47)
LYMPHOCYTES NFR BLD AUTO: 36 % (ref 14–44)
MCH RBC QN AUTO: 32.9 PG (ref 26.8–34.3)
MCHC RBC AUTO-ENTMCNC: 35 G/DL (ref 31.4–37.4)
MCV RBC AUTO: 94 FL (ref 82–98)
MONOCYTES # BLD AUTO: 0.76 THOUSAND/ΜL (ref 0.17–1.22)
MONOCYTES NFR BLD AUTO: 8 % (ref 4–12)
NEUTROPHILS # BLD AUTO: 5.06 THOUSANDS/ΜL (ref 1.85–7.62)
NEUTS SEG NFR BLD AUTO: 53 % (ref 43–75)
NRBC BLD AUTO-RTO: 0 /100 WBCS
PLATELET # BLD AUTO: 197 THOUSANDS/UL (ref 149–390)
PMV BLD AUTO: 10 FL (ref 8.9–12.7)
POTASSIUM SERPL-SCNC: 3.8 MMOL/L (ref 3.5–5.3)
RBC # BLD AUTO: 4.22 MILLION/UL (ref 3.88–5.62)
SODIUM SERPL-SCNC: 137 MMOL/L (ref 135–147)
WBC # BLD AUTO: 9.51 THOUSAND/UL (ref 4.31–10.16)

## 2022-07-30 PROCEDURE — 83036 HEMOGLOBIN GLYCOSYLATED A1C: CPT | Performed by: STUDENT IN AN ORGANIZED HEALTH CARE EDUCATION/TRAINING PROGRAM

## 2022-07-30 PROCEDURE — 85025 COMPLETE CBC W/AUTO DIFF WBC: CPT | Performed by: STUDENT IN AN ORGANIZED HEALTH CARE EDUCATION/TRAINING PROGRAM

## 2022-07-30 PROCEDURE — 80048 BASIC METABOLIC PNL TOTAL CA: CPT | Performed by: STUDENT IN AN ORGANIZED HEALTH CARE EDUCATION/TRAINING PROGRAM

## 2022-07-30 PROCEDURE — 99217 PR OBSERVATION CARE DISCHARGE MANAGEMENT: CPT | Performed by: STUDENT IN AN ORGANIZED HEALTH CARE EDUCATION/TRAINING PROGRAM

## 2022-07-30 PROCEDURE — 82948 REAGENT STRIP/BLOOD GLUCOSE: CPT

## 2022-07-30 RX ORDER — INSULIN GLARGINE 100 [IU]/ML
10 INJECTION, SOLUTION SUBCUTANEOUS DAILY
Qty: 3 ML | Refills: 3 | Status: ON HOLD | OUTPATIENT
Start: 2022-07-30 | End: 2022-09-04

## 2022-07-30 RX ORDER — LISINOPRIL AND HYDROCHLOROTHIAZIDE 20; 12.5 MG/1; MG/1
1 TABLET ORAL DAILY
Qty: 30 TABLET | Refills: 0 | Status: SHIPPED | OUTPATIENT
Start: 2022-07-30 | End: 2022-08-29

## 2022-07-30 RX ADMIN — THIAMINE HCL TAB 100 MG 100 MG: 100 TAB at 08:35

## 2022-07-30 RX ADMIN — Medication 1 TABLET: at 08:34

## 2022-07-30 RX ADMIN — AMLODIPINE BESYLATE 5 MG: 5 TABLET ORAL at 08:35

## 2022-07-30 RX ADMIN — LISINOPRIL 20 MG: 20 TABLET ORAL at 08:35

## 2022-07-30 RX ADMIN — Medication 12.5 MG: at 08:35

## 2022-07-30 RX ADMIN — FOLIC ACID 1 MG: 1 TABLET ORAL at 08:35

## 2022-07-30 RX ADMIN — ENOXAPARIN SODIUM 40 MG: 40 INJECTION SUBCUTANEOUS at 08:34

## 2022-07-30 RX ADMIN — DEXTROSE, SODIUM CHLORIDE, SODIUM LACTATE, POTASSIUM CHLORIDE, AND CALCIUM CHLORIDE 75 ML/HR: 5; .6; .31; .03; .02 INJECTION, SOLUTION INTRAVENOUS at 04:39

## 2022-07-30 NOTE — DISCHARGE SUMMARY
[address] [facility]  Discharge- 1930 [facility] 1970, 46 y o  male MRN: [id number]  Unit/Bed#: E4 -01 Encounter: [id number]  Primary Care Provider: ARIANE Sanchez   Date and time admitted to hospital: 7/29/2022  7:06 AM    * Acute encephalopathy  Assessment & Plan  Presented with altered mental status, unresponsive at home  Blood glucose found in the 20s in the ED, treated with D5 fluids, multiple amps of D50  Blood glucose improved, patient more alert and awake appears to be at baseline  Patient's blood glucose monitor overnight, had improved  Tolerate diet, alert and oriented x3  Discussed discharge planning and patient was in agreement  Patient's A1c of 6 7  Recommend metformin on discharge but declines  Decrease lantus to 10 units, check BG daily and follow up with PCP      Abnormal chest x-ray  Assessment & Plan  Chest x-ray shows right lower lung opacity  Patient without fevers, chills, cough or chest pains, no leukocytosis on labs  Possible aspiration in the setting of hypoglycemia, altered mental status  No indication for antibiotics at this time    Bilateral hearing loss  Assessment & Plan  Patient is hard of hearing, requires his father did translate Gabonese with  given low volume    Paroxysmal A-fib ([facility] [address] )  Assessment & Plan  History of proximal AFib, has brief episode of AFib RVR on admission  ChadsVasc of 2, discussed anticoagulation, patient with history of noncompliance  Continue metoprolol 12 5 b i d   For rate controlled    Uncomplicated alcohol dependence ([address] )  Assessment & Plan  Patient continues to drink significant amounts of alcohol daily  History of alcoholic induced pancreatitis  Continue CIWA protocol  Patient not wanting to quit alcohol at this time    Type 2 diabetes mellitus with hyperglycemia, with long-term current use of insulin [facility])  Assessment & Plan  Lab Results   Component Value Date    HGBA1C 6 7 (H) 07/30/2022       Recent Labs 07/29/22 2043 07/29/22  2247 07/30/22  0747 07/30/22  1145   POCGLU 71 95 142* 198*     Home insulin regimen of Lantus 25 units bedtime  As patient's A1c shows fairly good control at 6 7  And patient has not been taking insulin regularly, last refill was January 2022  Would recommend patient stop taking insulin at this time, start metformin 500 mg b i d  To follow with PCP outpatient    Essential hypertension  Assessment & Plan  History of hypertension, noncompliant with HCTZ, lisinopril  Given AFib RVR in the ED, resolved with diltiazem bolus  AFib RVR resolved, recommend continue metoprolol 12 5 mg b i d  Continue lisinopril/HCTZ        Discharging Physician / Practitioner: Perfecto Frankel MD  PCP: [facility]   Admission Date:   Admission Orders (From admission, onward)     Ordered        07/29/22 1142  Place in Observation  Once                      Discharge Date: 07/30/22    Medical Problems             Resolved Problems  Date Reviewed: 7/30/2022   None                 Consultations During Hospital Stay:  · None    Procedures Performed:   · None    Significant Findings / Test Results:   XR chest 2 views    Result Date: 7/29/2022  Impression: No acute cardiopulmonary disease  Workstation performed: [id number]     XR chest portable ICU    Result Date: 7/29/2022  · Impression: Right lower lung opacity and possible left retrocardiac opacity can represent atelectasis and/or infection in the appropriate clinical setting  An upright 2 view chest radiograph can be considered for further assessment  Workstation performed: [id number]   ·     Incidental Findings:   · none     Test Results Pending at Discharge (will require follow up):   · none     Outpatient Tests Requested:  · none    Complications:  none    Reason for Admission: [address] Course:     Gracie Sandoval is a 46 y o  male patient who originally presented to the hospital on 7/29/2022 due to altered mental status    Patient was found to be altered, unresponsive  Found to be hypoglycemic and treated with multiple amps of D50, did started D5 LR fluids  His blood glucose did improved, and he was became more alert and awake  Did have episode of AFib RVR, with history of proximal AFib not on anticoagulation  Patient started on metoprolol 12 5 b i d , blood pressure was monitored, resume lisinopril and added Norvasc  His blood pressure improved back to baseline, his blood glucose were monitor over course of 24 hours  A1c was checked at 6 7  Previously on Lantus 25 units at bedtime, though patient has not been compliant nor fault up with PCP outpatient  With his A1c of 6 7, and him taking insulin once every several days or weeks  Decrease lantus to 10 units and to follow up PCP outpatient  Discussed this with patient and father at bedside  Patient was discharged home  History of alcohol abuse, with positive alcohol levels on admission  Discussed alcohol cessation patient was not interested  Please see above list of diagnoses and related plan for additional information  Condition at Discharge: fair     Discharge Day Visit / Exam:     Subjective:  Patient seen examined today at bedside  Reports doing well, tolerating diet  Denies any palpitations, shortness of breath, nausea or vomiting  Patient okay for discharge home  Vitals: Blood Pressure: 134/86 (07/30/22 0725)  Pulse: 84 (07/30/22 0725)  Temperature: (!) 97 1 °F (36 2 °C) (07/30/22 0725)  Temp Source: Temporal (07/30/22 0725)  Respirations: 18 (07/30/22 0725)  SpO2: 99 % (07/30/22 0725)  Exam:   Physical Exam  Vitals and nursing note reviewed  Constitutional:       Appearance: Normal appearance  He is normal weight  HENT:      Head: Normocephalic and atraumatic  Eyes:      Conjunctiva/sclera: Conjunctivae normal       Pupils: Pupils are equal, round, and reactive to light  Cardiovascular:      Rate and Rhythm: Normal rate  Rhythm irregular        Heart sounds: Normal heart sounds  Pulmonary:      Breath sounds: Normal breath sounds  No wheezing or rhonchi  Abdominal:      General: Bowel sounds are normal  There is no distension  Palpations: Abdomen is soft  Tenderness: There is no abdominal tenderness  Musculoskeletal:         General: No swelling  Normal range of motion  Skin:     General: Skin is warm and dry  Neurological:      General: No focal deficit present  Mental Status: He is alert  Mental status is at baseline  Discussion with Family: father bedside    Discharge instructions/Information to patient and family:   See after visit summary for information provided to patient and family  Provisions for Follow-Up Care:  See after visit summary for information related to follow-up care and any pertinent home health orders  Disposition:     Home    Planned Readmission: none     Discharge Statement:  I spent 35 minutes discharging the patient  This time was spent on the day of discharge  I had direct contact with the patient on the day of discharge  Greater than 50% of the total time was spent examining patient, answering all patient questions, arranging and discussing plan of care with patient as well as directly providing post-discharge instructions  Additional time then spent on discharge activities  Discharge Medications:  See after visit summary for reconciled discharge medications provided to patient and family        ** Please Note: This note has been constructed using a voice recognition system **

## 2022-07-30 NOTE — ASSESSMENT & PLAN NOTE
Presented with altered mental status, unresponsive at home  Blood glucose found in the 20s in the ED, treated with D5 fluids, multiple amps of D50  Blood glucose improved, patient more alert and awake appears to be at baseline  Patient's blood glucose monitor overnight, had improved  Tolerate diet, alert and oriented x3  Discussed discharge planning and patient was in agreement  Stop taking insulin, patient's A1c of 6 7    Recommend metformin on discharge

## 2022-07-30 NOTE — ASSESSMENT & PLAN NOTE
History of proximal AFib, has brief episode of AFib RVR on admission  ChadsVasc of 2, discussed anticoagulation, patient with history of noncompliance  Continue metoprolol 12 5 b i d   For rate controlled

## 2022-07-30 NOTE — ASSESSMENT & PLAN NOTE
History of hypertension, noncompliant with HCTZ, lisinopril  Given AFib RVR in the ED, resolved with diltiazem bolus  AFib RVR resolved, recommend continue metoprolol 12 5 mg b i d    Continue lisinopril/HCTZ

## 2022-07-30 NOTE — ASSESSMENT & PLAN NOTE
Chest x-ray shows right lower lung opacity  Patient without fevers, chills, cough or chest pains, no leukocytosis on labs  Possible aspiration in the setting of hypoglycemia, altered mental status  No indication for antibiotics at this time

## 2022-07-30 NOTE — ASSESSMENT & PLAN NOTE
Lab Results   Component Value Date    HGBA1C 6 7 (H) 07/30/2022       Recent Labs     07/29/22  2043 07/29/22  2247 07/30/22  0747 07/30/22  1145   POCGLU 71 95 142* 198*     Home insulin regimen of Lantus 25 units bedtime  As patient's A1c shows fairly good control at 6 7  And patient has not been taking insulin regularly, last refill was January 2022  Would recommend patient stop taking insulin at this time, start metformin 500 mg b i d    To follow with PCP outpatient

## 2022-07-30 NOTE — PLAN OF CARE
Problem: Potential for Falls  Goal: Patient will remain free of falls  Description: INTERVENTIONS:  - Educate patient/family on patient safety including physical limitations  - Instruct patient to call for assistance with activity   - Consult OT/PT to assist with strengthening/mobility   - Keep Call bell within reach  - Keep bed low and locked with side rails adjusted as appropriate  - Keep care items and personal belongings within reach  - Initiate and maintain comfort rounds  - Make Fall Risk Sign visible to staff  - Offer Toileting every 2 Hours, in advance of need  - Initiate/Maintain bed alarm  - Obtain necessary fall risk management equipment: alarms  - Apply yellow socks and bracelet for high fall risk patients  - Consider moving patient to room near nurses station  Outcome: Progressing     Problem: PAIN - ADULT  Goal: Verbalizes/displays adequate comfort level or baseline comfort level  Description: Interventions:  - Encourage patient to monitor pain and request assistance  - Assess pain using appropriate pain scale  - Administer analgesics based on type and severity of pain and evaluate response  - Implement non-pharmacological measures as appropriate and evaluate response  - Consider cultural and social influences on pain and pain management  - Notify physician/advanced practitioner if interventions unsuccessful or patient reports new pain  Outcome: Progressing     Problem: INFECTION - ADULT  Goal: Absence or prevention of progression during hospitalization  Description: INTERVENTIONS:  - Assess and monitor for signs and symptoms of infection  - Monitor lab/diagnostic results  - Monitor all insertion sites, i e  indwelling lines, tubes, and drains  - Monitor endotracheal if appropriate and nasal secretions for changes in amount and color  - [place] appropriate cooling/warming therapies per order  - Administer medications as ordered  - Instruct and encourage patient and family to use good hand hygiene technique  - Identify and instruct in appropriate isolation precautions for identified infection/condition  Outcome: Progressing  Goal: Absence of fever/infection during neutropenic period  Description: INTERVENTIONS:  - Monitor WBC    Outcome: Progressing     Problem: SAFETY ADULT  Goal: Maintain or return to baseline ADL function  Description: INTERVENTIONS:  -  Assess patient's ability to carry out ADLs; assess patient's baseline for ADL function and identify physical deficits which impact ability to perform ADLs (bathing, care of mouth/teeth, toileting, grooming, dressing, etc )  - Assess/evaluate cause of self-care deficits   - Assess range of motion  - Assess patient's mobility; develop plan if impaired  - Assess patient's need for assistive devices and provide as appropriate  - Encourage maximum independence but intervene and supervise when necessary  - Involve family in performance of ADLs  - Assess for home care needs following discharge   - Consider OT consult to assist with ADL evaluation and planning for discharge  - Provide patient education as appropriate  Outcome: Progressing  Goal: Maintains/Returns to pre admission functional level  Description: INTERVENTIONS:  - Perform BMAT or MOVE assessment daily    - Set and communicate daily mobility goal to care team and patient/family/caregiver  - Collaborate with rehabilitation services on mobility goals if consulted  - Perform Range of Motion 2 times a day  - Reposition patient every 3 hours    - Dangle patient 3 times a day  - Stand patient 3 times a day  - Ambulate patient 3 times a day  - Out of bed to chair 3 times a day   - Out of bed for meals 3 times a day  - Out of bed for toileting  - Record patient progress and toleration of activity level   Outcome: Progressing     Problem: DISCHARGE PLANNING  Goal: Discharge to home or other facility with appropriate resources  Description: INTERVENTIONS:  - Identify barriers to discharge w/patient and caregiver  - Arrange for needed discharge resources and transportation as appropriate  - Identify discharge learning needs (meds, wound care, etc )  - Arrange for interpretive services to assist at discharge as needed  - Refer to Case Management Department for coordinating discharge planning if the patient needs post-hospital services based on physician/advanced practitioner order or complex needs related to functional status, cognitive ability, or social support system  Outcome: Progressing     Problem: Knowledge Deficit  Goal: Patient/family/caregiver demonstrates understanding of disease process, treatment plan, medications, and discharge instructions  Description: Complete learning assessment and assess knowledge base    Interventions:  - Provide teaching at level of understanding  - Provide teaching via preferred learning methods  Outcome: Progressing     Problem: METABOLIC, FLUID AND ELECTROLYTES - ADULT  Goal: Electrolytes maintained within normal limits  Description: INTERVENTIONS:  - Monitor labs and assess patient for signs and symptoms of electrolyte imbalances  - Administer electrolyte replacement as ordered  - Monitor response to electrolyte replacements, including repeat lab results as appropriate  - Instruct patient on fluid and nutrition as appropriate  Outcome: Progressing  Goal: Fluid balance maintained  Description: INTERVENTIONS:  - Monitor labs   - Monitor I/O and WT  - Instruct patient on fluid and nutrition as appropriate  - Assess for signs & symptoms of volume excess or deficit  Outcome: Progressing  Goal: Glucose maintained within target range  Description: INTERVENTIONS:  - Monitor Blood Glucose as ordered  - Assess for signs and symptoms of hyperglycemia and hypoglycemia  - Administer ordered medications to maintain glucose within target range  - Assess nutritional intake and initiate nutrition service referral as needed  Outcome: Progressing

## 2022-07-30 NOTE — UTILIZATION REVIEW
Initial Clinical Review    Admission: Date/Time/Statement:   Admission Orders (From admission, onward)     Ordered        07/29/22 1142  Place in Observation  Once                      Orders Placed This Encounter   Procedures    Place in Observation     Standing Status:   Standing     Number of Occurrences:   1     Order Specific Question:   Level of Care     Answer:   Med Surg [16]     ED Arrival Information     Expected   -    Arrival   7/29/2022 07:05    Acuity   Emergent            Means of arrival   Stretcher    Escorted by   [facility] ([facility])    Service   Hospitalist    Admission type   Emergency            Arrival complaint   hypoglycemia           Chief Complaint   Patient presents with    Hypoglycemia - Symptomatic     Ems called pt found unresponsive by parents at home  Hx of diabetes  On scenes pt BG 33  IM glucagon given  Pt BG went up to 77  Pt lethargic and cold        Initial Presentation: 46 y o  male to ED by ems admitted observation d/t acute encephalopathy with type 2 DM      * Acute encephalopathy  Assessment & Plan  Presented with altered mental status, unresponsive at home  Blood glucose found in the 20s in the ED, treated with D5 fluids, multiple amps of D50  Blood glucose improved, patient more alert and awake appears to be at baseline  Chest x-ray shows possible right lower lobe opacity, infiltrates  Denies any infectious etiology, fevers, chills, cough  Monitor off antibiotics  Continue D5 75 cc/hour  Monitor Accu-Cheks q 2 hours, if blood glucose remains elevated after 3-4 checks, may discontinue  Positive alcohol levels of 20, continue wants to drink alcohol, not interested in quitting  Will monitor on observation, possible discharge home tomorrow        Abnormal chest x-ray  Assessment & Plan  Chest x-ray shows right lower lung opacity  Patient without fevers, chills, cough or chest pains  Possible aspiration in the setting of hypoglycemia, altered mental status  Will continue monitor off antibiotics     Bilateral hearing loss  Assessment & Plan  Patient is hard of hearing, requires his father did translate Telugu with  given low volume     Paroxysmal A-fib ([facility] [address] )  Assessment & Plan  History of proximal AFib, has brief episode of AFib RVR on admission  ChadsVasc of 2, discussed anticoagulation, patient with history of noncompliance     Uncomplicated alcohol dependence ([facility] [address] )  Assessment & Plan  Patient continues to drink significant amounts of alcohol daily  History of alcoholic induced pancreatitis  Continue CIWA protocol  Patient not wanting to quit alcohol at this time     Type 2 diabetes mellitus with hyperglycemia, with long-term current use of insulin [facility])  Assessment & Plan        Lab Results   Component Value Date     HGBA1C 9 3 (A) 08/14/2020                Recent Labs     07/29/22  0909 07/29/22  0931 07/29/22  1022 07/29/22  1109   POCGLU <20* 124 43* 251*      Home insulin regimen of Lantus 25 units bedtime  Hold insulin setting of hypoglycemia  Accu-Cheks, sliding scale insulin     Essential hypertension  Assessment & Plan  History of hypertension, noncompliant with HCTZ, lisinopril  Given AFib RVR in the ED, resolved with diltiazem, start on Lopressor 25 b i d   Monitor blood pressure        ED Triage Vitals   Temperature Pulse Respirations Blood Pressure SpO2   07/29/22 0721 07/29/22 0711 07/29/22 0727 07/29/22 0711 07/29/22 0711   (!) 93 °F (33 9 °C) 68 12 (!) 205/114 100 %      Temp Source Heart Rate Source Patient Position - Orthostatic VS BP Location FiO2 (%)   07/29/22 0721 07/29/22 0711 07/29/22 0711 07/29/22 0711 --   Rectal Monitor Lying Right arm       Pain Score       07/29/22 1025       No Pain          Wt Readings from Last 1 Encounters:   01/30/22 60 1 kg (132 lb 7 9 oz)     Additional Vital Signs:   07/30/22 0725 97 1 °F (36 2 °C) Abnormal  84 18 134/86 105 99 % None (Room air) Lying   07/30/22 0016 97 °F (36 1 °C) Abnormal  90 18 136/78 102 99 % None (Room air) Lying   07/29/22 1625 97 6 °F (36 4 °C) 97 18 145/86 110 98 % None (Room air) Lying   07/29/22 1252 -- 98 -- 179/90 Abnormal  -- 100 % None (Room air) Lying   07/29/22 1232 -- 116 Abnormal  -- -- -- -- -- --   07/29/22 1145 -- 100 18 204/96 Abnormal  138 100 % None (Room air) Lying   07/29/22 1144 -- -- -- -- -- 100 % None (Room air) --   07/29/22 1030 97 4 °F (36 3 °C) Abnormal  -- -- -- -- -- -- --   07/29/22 1025 -- 92 16 133/58 -- 100 % None (Room air) Lying   07/29/22 1000 -- 140 Abnormal  16 -- -- -- -- --   07/29/22 0910 97 5 °F (36 4 °C) -- -- -- -- -- -- --   07/29/22 0905 -- 130 Abnormal  -- 160/92 -- 100 % None (Room air) Lying   07/29/22 0832 97 5 °F (36 4 °C) 112 Abnormal  -- -- -- 100 % None (Room air) --   07/29/22 0820 -- 118 Abnormal  12 156/91 -- -- -- Lying   07/29/22 0730 -- -- -- -- -- -- None (Room air) --   07/29/22 0727 -- -- 12 -- -- -- -- --   07/29/22 0721 93 °F (33 9 °C) Abnormal  -- -- -- -- -- -- --   07/29/22 0711 -- 68 -- 205/114 Abnormal  -- 100 % None (Room air) Lying       Pertinent Labs/Diagnostic Test Results:   7/29 ekg:  Atrial fibrillation with rapid ventricular response  Nonspecific T wave abnormality  Abnormal ECG  When compared with ECG of 30-JAN-2022 09:46,  Atrial fibrillation has replaced Sinus rhythm  Vent  rate has increased BY  59 BPM  Borderline criteria for Inferior infarct are no longer Present  Confirmed by Clay Koch ([id number]) on 7/29/2022 8:31:28 AM    XR chest 2 views   Final Result by Lilia Martinez MD (07/29 1142)      No acute cardiopulmonary disease  Workstation performed: [id number]         XR chest portable ICU   Final Result by Sanjay Dumont MD (07/29 5415)      Right lower lung opacity and possible left retrocardiac opacity can represent atelectasis and/or infection in the appropriate clinical setting  An upright 2 view chest radiograph can be considered for further assessment              Workstation performed: [id number]               Results from last 7 days   Lab Units 07/30/22  0431 07/29/22  0758   WBC Thousand/uL 9 51 7 09   HEMOGLOBIN g/dL 13 9 15 6   HEMATOCRIT % 39 7 45 7   PLATELETS Thousands/uL 197 228   NEUTROS ABS Thousands/µL 5 06 5 12         Results from last 7 days   Lab Units 07/30/22  0431 07/29/22  0819   SODIUM mmol/L 137 143   POTASSIUM mmol/L 3 8 3 2*   CHLORIDE mmol/L 105 106   CO2 mmol/L 24 27   ANION GAP mmol/L 8 10   BUN mg/dL 16 19   CREATININE mg/dL 1 00 1 12   EGFR ml/min/1 73sq m 86 75   CALCIUM mg/dL 8 0* 9 2   MAGNESIUM mg/dL  --  2 1     Results from last 7 days   Lab Units 07/29/22  0819   AST U/L 60*   ALT U/L 54   ALK PHOS U/L 90   TOTAL PROTEIN g/dL 8 1   ALBUMIN g/dL 4 1   TOTAL BILIRUBIN mg/dL 0 24     Results from last 7 days   Lab Units 07/30/22  0747 07/29/22  2247 07/29/22  2043 07/29/22  1630 07/29/22  1252 07/29/22  1109 07/29/22  1022 07/29/22  0931 07/29/22  0909 07/29/22  0836 07/29/22  0716   POC GLUCOSE mg/dl 142* 95 71 84 151* 251* 43* 124 <20* 58* 107     Results from last 7 days   Lab Units 07/30/22  0431 07/29/22  0819   GLUCOSE RANDOM mg/dL 191* 61*       Results from last 7 days   Lab Units 07/29/22  0819 07/29/22  0801   ETHANOL LVL mg/dL  --  20*   ACETAMINOPHEN LVL ug/mL <2*  --    SALICYLATE LVL mg/dL <3*  --        ED Treatment:   Medication Administration from 07/29/2022 0705 to 07/29/2022 1302       Date/Time Order Dose Route Action Action by Comments     07/29/2022 0718 glucagon (FOR EMS ONLY) (GLUCAGEN) injection 1 mg 0 mg Does not apply Given to EMS                  07/29/2022 0817 lactated ringers bolus 1,000 mL 1,000 mL Intravenous New Bag                  07/29/2022 0847 dextrose 5 % in lactated Ringer's infusion 100 mL/hr Intravenous New Bag       07/29/2022 1004 potassium chloride (K-DUR,KLOR-CON) CR tablet 40 mEq 40 mEq Oral Given                             07/29/2022 0913 dextrose 50 % IV solution 25 g 25 g Intravenous Given 07/29/2022 1011 diltiazem (CARDIZEM) injection 15 mg 15 mg Intravenous Given       07/29/2022 1027 dextrose 50 % IV solution 25 g 25 g Intravenous Given       07/29/2022 1210 dextrose 5 % in lactated Ringer's infusion 75 mL/hr Intravenous Rate/Dose Change       07/29/2022 1232 metoprolol tartrate (LOPRESSOR) partial tablet 12 5 mg 12 5 mg Oral Given       07/29/2022 1232 thiamine tablet 100 mg 100 mg Oral Given       31/42/9625 2357 folic acid (FOLVITE) tablet 1 mg 1 mg Oral Given       07/29/2022 1232 multivitamin-minerals (CENTRUM) tablet 1 tablet 1 tablet Oral Given       07/29/2022 1232 enoxaparin (LOVENOX) subcutaneous injection 40 mg 40 mg Subcutaneous Given          Past Medical History:   Diagnosis Date    Alcohol abuse     Chronic pancreatitis (HCC)     Diabetes mellitus (HCC)     Type 2    Pancreatitis     Paroxysmal A-fib (HCC)     Thrombocytopenia (HCC)      Present on Admission:   Essential hypertension   Uncomplicated alcohol dependence ([facility] )   Paroxysmal A-fib (HCC)   Bilateral hearing loss      Admitting Diagnosis: Hypokalemia [E87 6]  Hypoglycemia [E16 2]  Atrial fibrillation with RVR ([facility] ) [I48 91]  Age/Sex: 46 y o  male  Admission Orders:  Scheduled Medications:  amLODIPine, 5 mg, Oral, Daily  enoxaparin, 40 mg, Subcutaneous, Daily  folic acid, 1 mg, Oral, Daily  insulin lispro, 1-5 Units, Subcutaneous, TID AC  lisinopril, 20 mg, Oral, Daily  metoprolol tartrate, 12 5 mg, Oral, Q12H [address]  multivitamin-minerals, 1 tablet, Oral, Daily  thiamine, 100 mg, Oral, Daily      Continuous IV Infusions:  dextrose 5% lactated ringer's, 75 mL/hr, Intravenous, Continuous      PRN Meds:  acetaminophen, 650 mg, Oral, Q6H PRN  aluminum-magnesium hydroxide-simethicone, 30 mL, Oral, Q6H PRN  ondansetron, 4 mg, Intravenous, Q6H PRN      scd  CIWA  oob  Cons carb diet      Network Utilization Review Department  ATTENTION: Please call with any questions or concerns to [phone] and carefully listen to the prompts so that you are directed to the right person  All voicemails are confidential   Farrel Bodily all requests for admission clinical reviews, approved or denied determinations and any other requests to dedicated fax number below belonging to the campus where the patient is receiving treatment   List of dedicated fax numbers for the Facilities:  Marjan Form DENIALS (Administrative/Medical Necessity) [phone]   [address] [place] [address] (Maternity/NICU/Pediatrics) [phone]   [address]  [address] [place] [phone] [address] Doris Zimmerman [address] [facility] [place] [address] [phone]

## 2022-07-30 NOTE — PLAN OF CARE
Problem: Potential for Falls  Goal: Patient will remain free of falls  Description: INTERVENTIONS:  - Educate patient/family on patient safety including physical limitations  - Instruct patient to call for assistance with activity   - Consult OT/PT to assist with strengthening/mobility   - Keep Call bell within reach  - Keep bed low and locked with side rails adjusted as appropriate  - Keep care items and personal belongings within reach  - Initiate and maintain comfort rounds  - Make Fall Risk Sign visible to staff  - Offer Toileting every 2 Hours, in advance of need  - Initiate/Maintain bed alarm  - Obtain necessary fall risk management equipment: alarms  - Apply yellow socks and bracelet for high fall risk patients  - Consider moving patient to room near nurses station  7/29/2022 2107 by Deb Dean RN  Outcome: Progressing  7/29/2022 2105 by Deb Dean RN  Outcome: Progressing     Problem: PAIN - ADULT  Goal: Verbalizes/displays adequate comfort level or baseline comfort level  Description: Interventions:  - Encourage patient to monitor pain and request assistance  - Assess pain using appropriate pain scale  - Administer analgesics based on type and severity of pain and evaluate response  - Implement non-pharmacological measures as appropriate and evaluate response  - Consider cultural and social influences on pain and pain management  - Notify physician/advanced practitioner if interventions unsuccessful or patient reports new pain  7/29/2022 2107 by Deb Dean RN  Outcome: Progressing  7/29/2022 2105 by Deb Dean RN  Outcome: Progressing     Problem: INFECTION - ADULT  Goal: Absence or prevention of progression during hospitalization  Description: INTERVENTIONS:  - Assess and monitor for signs and symptoms of infection  - Monitor lab/diagnostic results  - Monitor all insertion sites, i e  indwelling lines, tubes, and drains  - Monitor endotracheal if appropriate and nasal secretions for changes in amount and color  - [place] appropriate cooling/warming therapies per order  - Administer medications as ordered  - Instruct and encourage patient and family to use good hand hygiene technique  - Identify and instruct in appropriate isolation precautions for identified infection/condition  7/29/2022 2107 by Jac Clark RN  Outcome: Progressing  7/29/2022 2105 by Jac Clark RN  Outcome: Progressing  Goal: Absence of fever/infection during neutropenic period  Description: INTERVENTIONS:  - Monitor WBC    7/29/2022 2107 by Jac Clark RN  Outcome: Progressing  7/29/2022 2105 by Jac Clark RN  Outcome: Progressing     Problem: SAFETY ADULT  Goal: Maintain or return to baseline ADL function  Description: INTERVENTIONS:  -  Assess patient's ability to carry out ADLs; assess patient's baseline for ADL function and identify physical deficits which impact ability to perform ADLs (bathing, care of mouth/teeth, toileting, grooming, dressing, etc )  - Assess/evaluate cause of self-care deficits   - Assess range of motion  - Assess patient's mobility; develop plan if impaired  - Assess patient's need for assistive devices and provide as appropriate  - Encourage maximum independence but intervene and supervise when necessary  - Involve family in performance of ADLs  - Assess for home care needs following discharge   - Consider OT consult to assist with ADL evaluation and planning for discharge  - Provide patient education as appropriate  7/29/2022 2107 by Jac Clark RN  Outcome: Progressing  7/29/2022 2105 by Jac Clark RN  Outcome: Progressing  Goal: Maintains/Returns to pre admission functional level  Description: INTERVENTIONS:  - Perform BMAT or MOVE assessment daily    - Set and communicate daily mobility goal to care team and patient/family/caregiver     - Collaborate with rehabilitation services on mobility goals if consulted  - Perform Range of Motion 2 times a day  - Reposition patient every 3 hours  - Dangle patient 3 times a day  - Stand patient 3 times a day  - Ambulate patient 3 times a day  - Out of bed to chair 3 times a day   - Out of bed for meals 3 times a day  - Out of bed for toileting  - Record patient progress and toleration of activity level   7/29/2022 2107 by Jac Clark RN  Outcome: Progressing  7/29/2022 2105 by Jac Clark RN  Outcome: Progressing     Problem: DISCHARGE PLANNING  Goal: Discharge to home or other facility with appropriate resources  Description: INTERVENTIONS:  - Identify barriers to discharge w/patient and caregiver  - Arrange for needed discharge resources and transportation as appropriate  - Identify discharge learning needs (meds, wound care, etc )  - Arrange for interpretive services to assist at discharge as needed  - Refer to Case Management Department for coordinating discharge planning if the patient needs post-hospital services based on physician/advanced practitioner order or complex needs related to functional status, cognitive ability, or social support system  7/29/2022 2107 by Jac Clark RN  Outcome: Progressing  7/29/2022 2105 by Jac Clark RN  Outcome: Progressing     Problem: Knowledge Deficit  Goal: Patient/family/caregiver demonstrates understanding of disease process, treatment plan, medications, and discharge instructions  Description: Complete learning assessment and assess knowledge base    Interventions:  - Provide teaching at level of understanding  - Provide teaching via preferred learning methods  7/29/2022 2107 by Jac Clark RN  Outcome: Progressing  7/29/2022 2105 by Jac Clark RN  Outcome: Progressing     Problem: METABOLIC, FLUID AND ELECTROLYTES - ADULT  Goal: Electrolytes maintained within normal limits  Description: INTERVENTIONS:  - Monitor labs and assess patient for signs and symptoms of electrolyte imbalances  - Administer electrolyte replacement as ordered  - Monitor response to electrolyte replacements, including repeat lab results as appropriate  - Instruct patient on fluid and nutrition as appropriate  7/29/2022 2107 by Dyan Scruggs RN  Outcome: Progressing  7/29/2022 2105 by Dyan Scruggs RN  Outcome: Progressing  Goal: Fluid balance maintained  Description: INTERVENTIONS:  - Monitor labs   - Monitor I/O and WT  - Instruct patient on fluid and nutrition as appropriate  - Assess for signs & symptoms of volume excess or deficit  7/29/2022 2107 by Dyan Scruggs RN  Outcome: Progressing  7/29/2022 2105 by Dyan Scruggs RN  Outcome: Progressing  Goal: Glucose maintained within target range  Description: INTERVENTIONS:  - Monitor Blood Glucose as ordered  - Assess for signs and symptoms of hyperglycemia and hypoglycemia  - Administer ordered medications to maintain glucose within target range  - Assess nutritional intake and initiate nutrition service referral as needed  7/29/2022 2107 by Dyan Scruggs RN  Outcome: Progressing  7/29/2022 2105 by Dyan Scruggs RN  Outcome: Progressing

## 2022-09-01 ENCOUNTER — APPOINTMENT (OUTPATIENT)
Dept: RADIOLOGY | Facility: HOSPITAL | Age: 52
DRG: 420 | End: 2022-09-01
Payer: COMMERCIAL

## 2022-09-01 ENCOUNTER — APPOINTMENT (EMERGENCY)
Dept: CT IMAGING | Facility: HOSPITAL | Age: 52
DRG: 420 | End: 2022-09-01
Payer: COMMERCIAL

## 2022-09-01 ENCOUNTER — HOSPITAL ENCOUNTER (INPATIENT)
Facility: HOSPITAL | Age: 52
LOS: 3 days | Discharge: HOME | DRG: 420 | End: 2022-09-04
Attending: EMERGENCY MEDICINE | Admitting: INTERNAL MEDICINE
Payer: COMMERCIAL

## 2022-09-01 DIAGNOSIS — Z79.4 TYPE 2 DIABETES MELLITUS WITHOUT COMPLICATION, WITH LONG-TERM CURRENT USE OF INSULIN (HCC): ICD-10-CM

## 2022-09-01 DIAGNOSIS — E11.65 TYPE 2 DIABETES MELLITUS WITH HYPERGLYCEMIA, WITH LONG-TERM CURRENT USE OF INSULIN (HCC): ICD-10-CM

## 2022-09-01 DIAGNOSIS — Z79.4 TYPE 2 DIABETES MELLITUS WITH HYPERGLYCEMIA, WITH LONG-TERM CURRENT USE OF INSULIN (HCC): ICD-10-CM

## 2022-09-01 DIAGNOSIS — R53.1 GENERALIZED WEAKNESS: Primary | ICD-10-CM

## 2022-09-01 DIAGNOSIS — E11.9 TYPE 2 DIABETES MELLITUS WITHOUT COMPLICATION, WITH LONG-TERM CURRENT USE OF INSULIN (HCC): ICD-10-CM

## 2022-09-01 DIAGNOSIS — R94.31 EKG ABNORMALITIES: ICD-10-CM

## 2022-09-01 LAB
2HR DELTA HS TROPONIN: 1 NG/L
4HR DELTA HS TROPONIN: 0 NG/L
ALBUMIN SERPL BCP-MCNC: 3.7 G/DL (ref 3.5–5)
ALP SERPL-CCNC: 91 U/L (ref 46–116)
ALT SERPL W P-5'-P-CCNC: 28 U/L (ref 12–78)
AMPHETAMINES SERPL QL SCN: NEGATIVE
ANION GAP SERPL CALCULATED.3IONS-SCNC: 11 MMOL/L (ref 4–13)
APTT PPP: 23 SECONDS (ref 23–37)
AST SERPL W P-5'-P-CCNC: 14 U/L (ref 5–45)
ATRIAL RATE: 70 BPM
ATRIAL RATE: 72 BPM
ATRIAL RATE: 72 BPM
BARBITURATES UR QL: NEGATIVE
BASOPHILS # BLD AUTO: 0.03 THOUSANDS/ΜL (ref 0–0.1)
BASOPHILS NFR BLD AUTO: 0 % (ref 0–1)
BENZODIAZ UR QL: NEGATIVE
BILIRUB SERPL-MCNC: 0.41 MG/DL (ref 0.2–1)
BUN SERPL-MCNC: 11 MG/DL (ref 5–25)
CALCIUM SERPL-MCNC: 9.2 MG/DL (ref 8.3–10.1)
CARDIAC TROPONIN I PNL SERPL HS: 5 NG/L
CARDIAC TROPONIN I PNL SERPL HS: 5 NG/L
CARDIAC TROPONIN I PNL SERPL HS: 6 NG/L
CHLORIDE SERPL-SCNC: 102 MMOL/L (ref 96–108)
CO2 SERPL-SCNC: 29 MMOL/L (ref 21–32)
COCAINE UR QL: POSITIVE
CREAT SERPL-MCNC: 1.26 MG/DL (ref 0.6–1.3)
EOSINOPHIL # BLD AUTO: 0.56 THOUSAND/ΜL (ref 0–0.61)
EOSINOPHIL NFR BLD AUTO: 5 % (ref 0–6)
ERYTHROCYTE [DISTWIDTH] IN BLOOD BY AUTOMATED COUNT: 10.5 % (ref 11.6–15.1)
ETHANOL SERPL-MCNC: <3 MG/DL (ref 0–3)
FOLATE SERPL-MCNC: >20 NG/ML (ref 3.1–17.5)
GFR SERPL CREATININE-BSD FRML MDRD: 65 ML/MIN/1.73SQ M
GLUCOSE SERPL-MCNC: 128 MG/DL (ref 65–140)
GLUCOSE SERPL-MCNC: 227 MG/DL (ref 65–140)
GLUCOSE SERPL-MCNC: 227 MG/DL (ref 65–140)
GLUCOSE SERPL-MCNC: 39 MG/DL (ref 65–140)
GLUCOSE SERPL-MCNC: 92 MG/DL (ref 65–140)
GLUCOSE SERPL-MCNC: 99 MG/DL (ref 65–140)
HCT VFR BLD AUTO: 41.1 % (ref 36.5–49.3)
HGB BLD-MCNC: 15.2 G/DL (ref 12–17)
IMM GRANULOCYTES # BLD AUTO: 0.03 THOUSAND/UL (ref 0–0.2)
IMM GRANULOCYTES NFR BLD AUTO: 0 % (ref 0–2)
INR PPP: 1 (ref 0.84–1.19)
LYMPHOCYTES # BLD AUTO: 2.41 THOUSANDS/ΜL (ref 0.6–4.47)
LYMPHOCYTES NFR BLD AUTO: 21 % (ref 14–44)
MAGNESIUM SERPL-MCNC: 1.3 MG/DL (ref 1.6–2.6)
MCH RBC QN AUTO: 31.7 PG (ref 26.8–34.3)
MCHC RBC AUTO-ENTMCNC: 37 G/DL (ref 31.4–37.4)
MCV RBC AUTO: 86 FL (ref 82–98)
METHADONE UR QL: NEGATIVE
MONOCYTES # BLD AUTO: 0.84 THOUSAND/ΜL (ref 0.17–1.22)
MONOCYTES NFR BLD AUTO: 7 % (ref 4–12)
NEUTROPHILS # BLD AUTO: 7.47 THOUSANDS/ΜL (ref 1.85–7.62)
NEUTS SEG NFR BLD AUTO: 67 % (ref 43–75)
NRBC BLD AUTO-RTO: 0 /100 WBCS
OPIATES UR QL SCN: NEGATIVE
OXYCODONE+OXYMORPHONE UR QL SCN: NEGATIVE
P AXIS: 52 DEGREES
P AXIS: 65 DEGREES
P AXIS: 66 DEGREES
PCP UR QL: NEGATIVE
PLATELET # BLD AUTO: 182 THOUSANDS/UL (ref 149–390)
PMV BLD AUTO: 10.9 FL (ref 8.9–12.7)
POTASSIUM SERPL-SCNC: 3.1 MMOL/L (ref 3.5–5.3)
PR INTERVAL: 142 MS
PR INTERVAL: 142 MS
PR INTERVAL: 144 MS
PROT SERPL-MCNC: 7.6 G/DL (ref 6.4–8.4)
PROTHROMBIN TIME: 13.2 SECONDS (ref 11.6–14.5)
QRS AXIS: 24 DEGREES
QRS AXIS: 27 DEGREES
QRS AXIS: 28 DEGREES
QRSD INTERVAL: 104 MS
QRSD INTERVAL: 94 MS
QRSD INTERVAL: 98 MS
QT INTERVAL: 376 MS
QT INTERVAL: 376 MS
QT INTERVAL: 394 MS
QTC INTERVAL: 406 MS
QTC INTERVAL: 411 MS
QTC INTERVAL: 431 MS
RBC # BLD AUTO: 4.79 MILLION/UL (ref 3.88–5.62)
SARS-COV-2 AG UPPER RESP QL IA: NORMAL
SODIUM SERPL-SCNC: 142 MMOL/L (ref 135–147)
T WAVE AXIS: -35 DEGREES
T WAVE AXIS: -35 DEGREES
T WAVE AXIS: -43 DEGREES
THC UR QL: NEGATIVE
TSH SERPL DL<=0.05 MIU/L-ACNC: 0.54 UIU/ML (ref 0.45–4.5)
TSH SERPL DL<=0.05 MIU/L-ACNC: 0.9 UIU/ML (ref 0.45–4.5)
VENTRICULAR RATE: 70 BPM
VENTRICULAR RATE: 72 BPM
VENTRICULAR RATE: 72 BPM
VIT B12 SERPL-MCNC: 496 PG/ML (ref 100–900)
WBC # BLD AUTO: 11.34 THOUSAND/UL (ref 4.31–10.16)

## 2022-09-01 PROCEDURE — 87811 SARS-COV-2 COVID19 W/OPTIC: CPT | Performed by: EMERGENCY MEDICINE

## 2022-09-01 PROCEDURE — 85610 PROTHROMBIN TIME: CPT | Performed by: EMERGENCY MEDICINE

## 2022-09-01 PROCEDURE — 93005 ELECTROCARDIOGRAM TRACING: CPT

## 2022-09-01 PROCEDURE — 82077 ASSAY SPEC XCP UR&BREATH IA: CPT | Performed by: INTERNAL MEDICINE

## 2022-09-01 PROCEDURE — 82746 ASSAY OF FOLIC ACID SERUM: CPT | Performed by: INTERNAL MEDICINE

## 2022-09-01 PROCEDURE — 84484 ASSAY OF TROPONIN QUANT: CPT | Performed by: EMERGENCY MEDICINE

## 2022-09-01 PROCEDURE — 82948 REAGENT STRIP/BLOOD GLUCOSE: CPT

## 2022-09-01 PROCEDURE — 99285 EMERGENCY DEPT VISIT HI MDM: CPT

## 2022-09-01 PROCEDURE — 85730 THROMBOPLASTIN TIME PARTIAL: CPT | Performed by: EMERGENCY MEDICINE

## 2022-09-01 PROCEDURE — 93010 ELECTROCARDIOGRAM REPORT: CPT | Performed by: INTERNAL MEDICINE

## 2022-09-01 PROCEDURE — 93010 ELECTROCARDIOGRAM REPORT: CPT

## 2022-09-01 PROCEDURE — 82607 VITAMIN B-12: CPT | Performed by: INTERNAL MEDICINE

## 2022-09-01 PROCEDURE — 83735 ASSAY OF MAGNESIUM: CPT | Performed by: INTERNAL MEDICINE

## 2022-09-01 PROCEDURE — 84443 ASSAY THYROID STIM HORMONE: CPT | Performed by: INTERNAL MEDICINE

## 2022-09-01 PROCEDURE — 71045 X-RAY EXAM CHEST 1 VIEW: CPT

## 2022-09-01 PROCEDURE — 70450 CT HEAD/BRAIN W/O DYE: CPT

## 2022-09-01 PROCEDURE — 99285 EMERGENCY DEPT VISIT HI MDM: CPT | Performed by: EMERGENCY MEDICINE

## 2022-09-01 PROCEDURE — 99223 1ST HOSP IP/OBS HIGH 75: CPT | Performed by: INTERNAL MEDICINE

## 2022-09-01 PROCEDURE — 87040 BLOOD CULTURE FOR BACTERIA: CPT | Performed by: INTERNAL MEDICINE

## 2022-09-01 PROCEDURE — 87636 SARSCOV2 & INF A&B AMP PRB: CPT | Performed by: EMERGENCY MEDICINE

## 2022-09-01 PROCEDURE — 85025 COMPLETE CBC W/AUTO DIFF WBC: CPT | Performed by: EMERGENCY MEDICINE

## 2022-09-01 PROCEDURE — 36415 COLL VENOUS BLD VENIPUNCTURE: CPT | Performed by: EMERGENCY MEDICINE

## 2022-09-01 PROCEDURE — 80053 COMPREHEN METABOLIC PANEL: CPT | Performed by: EMERGENCY MEDICINE

## 2022-09-01 PROCEDURE — G1004 CDSM NDSC: HCPCS

## 2022-09-01 PROCEDURE — 80307 DRUG TEST PRSMV CHEM ANLYZR: CPT | Performed by: INTERNAL MEDICINE

## 2022-09-01 PROCEDURE — 84443 ASSAY THYROID STIM HORMONE: CPT | Performed by: EMERGENCY MEDICINE

## 2022-09-01 RX ORDER — INSULIN LISPRO 100 [IU]/ML
1-5 INJECTION, SOLUTION INTRAVENOUS; SUBCUTANEOUS
Status: DISCONTINUED | OUTPATIENT
Start: 2022-09-01 | End: 2022-09-02

## 2022-09-01 RX ORDER — POTASSIUM CHLORIDE 20 MEQ/1
40 TABLET, EXTENDED RELEASE ORAL ONCE
Status: COMPLETED | OUTPATIENT
Start: 2022-09-01 | End: 2022-09-01

## 2022-09-01 RX ORDER — FOLIC ACID 1 MG/1
1 TABLET ORAL DAILY
Status: DISCONTINUED | OUTPATIENT
Start: 2022-09-01 | End: 2022-09-04 | Stop reason: HOSPADM

## 2022-09-01 RX ORDER — ENOXAPARIN SODIUM 100 MG/ML
40 INJECTION SUBCUTANEOUS
Status: DISCONTINUED | OUTPATIENT
Start: 2022-09-02 | End: 2022-09-04 | Stop reason: HOSPADM

## 2022-09-01 RX ORDER — DOCUSATE SODIUM 100 MG/1
100 CAPSULE, LIQUID FILLED ORAL 2 TIMES DAILY
Status: DISCONTINUED | OUTPATIENT
Start: 2022-09-01 | End: 2022-09-04 | Stop reason: HOSPADM

## 2022-09-01 RX ORDER — SODIUM CHLORIDE 9 MG/ML
75 INJECTION, SOLUTION INTRAVENOUS CONTINUOUS
Status: DISCONTINUED | OUTPATIENT
Start: 2022-09-01 | End: 2022-09-04 | Stop reason: HOSPADM

## 2022-09-01 RX ORDER — DEXTROSE MONOHYDRATE 25 G/50ML
INJECTION, SOLUTION INTRAVENOUS
Status: COMPLETED
Start: 2022-09-01 | End: 2022-09-01

## 2022-09-01 RX ORDER — ASPIRIN 81 MG/1
324 TABLET, CHEWABLE ORAL ONCE
Status: COMPLETED | OUTPATIENT
Start: 2022-09-01 | End: 2022-09-01

## 2022-09-01 RX ORDER — ONDANSETRON 2 MG/ML
4 INJECTION INTRAMUSCULAR; INTRAVENOUS EVERY 6 HOURS PRN
Status: DISCONTINUED | OUTPATIENT
Start: 2022-09-01 | End: 2022-09-04 | Stop reason: HOSPADM

## 2022-09-01 RX ORDER — LANOLIN ALCOHOL/MO/W.PET/CERES
100 CREAM (GRAM) TOPICAL DAILY
Status: DISCONTINUED | OUTPATIENT
Start: 2022-09-01 | End: 2022-09-04 | Stop reason: HOSPADM

## 2022-09-01 RX ADMIN — THIAMINE HCL TAB 100 MG 100 MG: 100 TAB at 17:39

## 2022-09-01 RX ADMIN — Medication 1 TABLET: at 17:38

## 2022-09-01 RX ADMIN — SODIUM CHLORIDE 75 ML/HR: 0.9 INJECTION, SOLUTION INTRAVENOUS at 17:38

## 2022-09-01 RX ADMIN — ASPIRIN 81 MG CHEWABLE TABLET 324 MG: 81 TABLET CHEWABLE at 14:20

## 2022-09-01 RX ADMIN — INSULIN LISPRO 2 UNITS: 100 INJECTION, SOLUTION INTRAVENOUS; SUBCUTANEOUS at 21:29

## 2022-09-01 RX ADMIN — FOLIC ACID 1 MG: 1 TABLET ORAL at 17:38

## 2022-09-01 RX ADMIN — SODIUM CHLORIDE 1000 ML: 0.9 INJECTION, SOLUTION INTRAVENOUS at 12:14

## 2022-09-01 RX ADMIN — DEXTROSE MONOHYDRATE 50 ML: 25 INJECTION, SOLUTION INTRAVENOUS at 16:30

## 2022-09-01 RX ADMIN — POTASSIUM CHLORIDE 40 MEQ: 1500 TABLET, EXTENDED RELEASE ORAL at 14:21

## 2022-09-01 RX ADMIN — Medication 12.5 MG: at 17:38

## 2022-09-01 NOTE — PLAN OF CARE
Problem: Potential for Falls  Goal: Patient will remain free of falls  Description: INTERVENTIONS:  - Educate patient/family on patient safety including physical limitations  - Instruct patient to call for assistance with activity   - Consult OT/PT to assist with strengthening/mobility   - Keep Call bell within reach  - Keep bed low and locked with side rails adjusted as appropriate  - Keep care items and personal belongings within reach  - Initiate and maintain comfort rounds  - Make Fall Risk Sign visible to staff  - Apply yellow socks and bracelet for high fall risk patients  - Consider moving patient to room near nurses station  Outcome: Progressing     Problem: MOBILITY - ADULT  Goal: Maintain or return to baseline ADL function  Description: INTERVENTIONS:  -  Assess patient's ability to carry out ADLs; assess patient's baseline for ADL function and identify physical deficits which impact ability to perform ADLs (bathing, care of mouth/teeth, toileting, grooming, dressing, etc )  - Assess/evaluate cause of self-care deficits   - Assess range of motion  - Assess patient's mobility; develop plan if impaired  - Assess patient's need for assistive devices and provide as appropriate  - Encourage maximum independence but intervene and supervise when necessary  - Involve family in performance of ADLs  - Assess for home care needs following discharge   - Consider OT consult to assist with ADL evaluation and planning for discharge  - Provide patient education as appropriate  Outcome: Progressing  Goal: Maintains/Returns to pre admission functional level  Description: INTERVENTIONS:  - Perform BMAT or MOVE assessment daily    - Set and communicate daily mobility goal to care team and patient/family/caregiver  - Collaborate with rehabilitation services on mobility goals if consulted  - Perform Range of Motion 3 times a day  - Reposition patient every 2 hours    - Dangle patient 3 times a day  - Stand patient 3 times a day  - Ambulate patient 3 times a day  - Out of bed to chair 3 times a day   - Out of bed for meals 3 times a day  - Out of bed for toileting  - Record patient progress and toleration of activity level   Outcome: Progressing     Problem: SUBSTANCE USE/ABUSE  Goal: By discharge, patient will have ongoing treatment plan addressing chemical dependency  Description: INTERVENTIONS:  - Assist patient with resources and/or appointments for ongoing recovery based living  Outcome: Progressing     Problem: PAIN - ADULT  Goal: Verbalizes/displays adequate comfort level or baseline comfort level  Description: Interventions:  - Encourage patient to monitor pain and request assistance  - Assess pain using appropriate pain scale  - Administer analgesics based on type and severity of pain and evaluate response  - Implement non-pharmacological measures as appropriate and evaluate response  - Consider cultural and social influences on pain and pain management  - Notify physician/advanced practitioner if interventions unsuccessful or patient reports new pain  Outcome: Progressing     Problem: SAFETY ADULT  Goal: Patient will remain free of falls  Description: INTERVENTIONS:  - Educate patient/family on patient safety including physical limitations  - Instruct patient to call for assistance with activity   - Consult OT/PT to assist with strengthening/mobility   - Keep Call bell within reach  - Keep bed low and locked with side rails adjusted as appropriate  - Keep care items and personal belongings within reach  - Initiate and maintain comfort rounds  - Make Fall Risk Sign visible to staff  - Apply yellow socks and bracelet for high fall risk patients  - Consider moving patient to room near nurses station  Outcome: Progressing  Goal: Maintain or return to baseline ADL function  Description: INTERVENTIONS:  -  Assess patient's ability to carry out ADLs; assess patient's baseline for ADL function and identify physical deficits which impact ability to perform ADLs (bathing, care of mouth/teeth, toileting, grooming, dressing, etc )  - Assess/evaluate cause of self-care deficits   - Assess range of motion  - Assess patient's mobility; develop plan if impaired  - Assess patient's need for assistive devices and provide as appropriate  - Encourage maximum independence but intervene and supervise when necessary  - Involve family in performance of ADLs  - Assess for home care needs following discharge   - Consider OT consult to assist with ADL evaluation and planning for discharge  - Provide patient education as appropriate  Outcome: Progressing  Goal: Maintains/Returns to pre admission functional level  Description: INTERVENTIONS:  - Perform BMAT or MOVE assessment daily    - Set and communicate daily mobility goal to care team and patient/family/caregiver  - Collaborate with rehabilitation services on mobility goals if consulted  - Perform Range of Motion 3 times a day  - Reposition patient every 2 hours    - Dangle patient 3 times a day  - Stand patient 3 times a day  - Ambulate patient 3 times a day  - Out of bed to chair 3 times a day   - Out of bed for meals 3 times a day  - Out of bed for toileting  - Record patient progress and toleration of activity level   Outcome: Progressing     Problem: DISCHARGE PLANNING  Goal: Discharge to home or other facility with appropriate resources  Description: INTERVENTIONS:  - Identify barriers to discharge w/patient and caregiver  - Arrange for needed discharge resources and transportation as appropriate  - Identify discharge learning needs (meds, wound care, etc )  - Arrange for interpretive services to assist at discharge as needed  - Refer to Case Management Department for coordinating discharge planning if the patient needs post-hospital services based on physician/advanced practitioner order or complex needs related to functional status, cognitive ability, or social support system  Outcome: Progressing     Problem: Knowledge Deficit  Goal: Patient/family/caregiver demonstrates understanding of disease process, treatment plan, medications, and discharge instructions  Description: Complete learning assessment and assess knowledge base    Interventions:  - Provide teaching at level of understanding  - Provide teaching via preferred learning methods  Outcome: Progressing

## 2022-09-01 NOTE — CASE MANAGEMENT
Case Management ED Progress Note    Patient name Anival Suero  Location ED 21/ED 21 MRN [id number]  : 1970 Date 2022        OBJECTIVE:  Predictive Model Details         89% Factor Value    Risk of Hospital Admission or ED Visit Model Number of ED Visits 1     Has Medicaid Yes     Is in Relationship No     Number of Hospitalizations 1     Has Chronic Kidney Disease Yes     Has Atrial Fibrillation Yes     Has Anemia Yes     Has CVD Yes     Has Diabetes Yes     Has PCP Yes            Chief Complaint: Weakness, Back pain   Patient Class: Observation  Preferred Pharmacy:   [address] [place] [address],[address], [address]  [address]  Phone: [phone] Fax: [phone]    Primary Care Provider: ARIANE Sargent    Primary Insurance: [address] [place] [address]  Secondary Insurance:     ED Progress Note:    Patient uncooperative with interview process  This worker unable to complete initial assessment

## 2022-09-01 NOTE — ASSESSMENT & PLAN NOTE
· Patient has a history of alcohol abuse  · Notes he drinks every day, all day    Unable to quantify further  · Pre-contemplative on cessation  · Check alcohol level  · Start CIWA scale, thiamine, folic acid

## 2022-09-01 NOTE — ASSESSMENT & PLAN NOTE
Lab Results   Component Value Date    HGBA1C 6 7 (H) 07/30/2022       Recent Labs     09/01/22  0930 09/01/22  1100   POCGLU 128 92       Blood Sugar Average: Last 72 hrs:  (P) 110     · Patient has diabetes type 2, with long-term use of insulin, with associated chronic kidney disease stage 3  · Patient has a history of variable compliance  · Prior admissions for hypoglycemia  · On previous discharge he was instructed to take Lantus 10 units daily  · Pt notes he takes lantus 15u qhs but did not take any the past 2 days due to poor appetite

## 2022-09-01 NOTE — H&P
[address] 1970, 46 y o  male MRN: [id number]  Unit/Bed#: [place] [address] -01 Encounter: [id number]  Primary Care Provider: ARIANE Ruiz   Date and time admitted to hospital: 9/1/2022  9:21 AM    * Weakness  Assessment & Plan  Patient is a 55-year-old male with past medical history significant for insulin-dependent diabetes, paroxysmal atrial fibrillation, and alcohol abuse who presented to the ER for evaluation of generalized weakness    · Patient reports generalized weakness, fatigue, without any focal symptoms  Poor po intake x 24h without n/v/d or abd pain  · Will be admitted to the hospital to undergo further evaluation  · A) metabolic workup:  · Check alcohol level  · Check B12/folate/TSH  · Monitor glycemic control due to his previous admission with generalized weakness secondary to hypoglycemia  · BS after admission recheck and was 39:    · Pt notes he has not checked his BS in over 5d  Has been taking his insulin, but not the past 2 days  B) infectious workup:   -patient with mild leukocytosis of 11:   Will check pan cultures due to nonspecific symptoms   -check urinalysis  c) no focal neurologic deficits on exam   -negative head CT  D) toxic metabolic weakness  -patient with history of alcohol abuse, and prior cocaine use  -check urine drug screen and alcohol level  E) cardiac:  No evidence of ischemia on EKG or enzymes    Type 2 diabetes mellitus with hyperglycemia, with long-term current use of insulin (HCC)  Assessment & Plan  Lab Results   Component Value Date    HGBA1C 6 7 (H) 07/30/2022       Recent Labs     09/01/22  0930 09/01/22  1100   POCGLU 128 92       Blood Sugar Average: Last 72 hrs:  (P) 110     · Patient has diabetes type 2, with long-term use of insulin, with associated chronic kidney disease stage 3  · Patient has a history of variable compliance  · Prior admissions for hypoglycemia  · On previous discharge he was instructed to take Lantus 10 units daily  · Pt notes he takes lantus 15u qhs but did not take any the past 2 days due to poor appetite    Hypokalemia  Assessment & Plan  · Patient with hypokalemia  · Replete and recheck  · Check magnesium level    CKD (chronic kidney disease), stage III [facility])  Assessment & Plan  Lab Results   Component Value Date    EGFR 65 09/01/2022    EGFR 86 07/30/2022    EGFR 75 07/29/2022    CREATININE 1 26 09/01/2022    CREATININE 1 00 07/30/2022    CREATININE 1 12 07/29/2022     · Creatinine currently at baseline of 1 0-1 2    Paroxysmal A-fib ([facility] )  Assessment & Plan  · Patient has paroxysmal atrial fibrillation  · Is prescribed metoprolol 12 5 mg b i d   · Noted to have medication and follow-up noncompliance and is not currently prescribed anticoagulation, with h/o alcohol abuse    Uncomplicated alcohol dependence ([facility] )  Assessment & Plan  · Patient has a history of alcohol abuse  · Notes he drinks every day, all day  Unable to quantify further  · Pre-contemplative on cessation  · Check alcohol level  · Start CIWA scale, thiamine, folic acid    Essential hypertension  Assessment & Plan  · Patient has essential hypertension  · Continue home medications with lisinopril/HCTZ 20-12 5 mg daily, and metoprolol 12 5 mg q 12 hours  · Continue medications and monitor, titrate as needed        =======================================  History of Present Illness     HPI:  Marin Rand is a 46 y o  male who presented to the ER for evaluation of generalized weakness  History is taken by me, in Serbian at the bedside, from both patient and his father  Patient is hard of hearing  His father assists with the history  Patient notes he has not felt well since yesterday  Yesterday he had poor appetite, and did not eat much at all  He denies any nausea, vomiting, abdominal pain, postprandial pain, diarrhea or constipation  He notes he just did not feel like eating    He states however today this has improved and is currently hungry and wants to eat  Patient was admitted 07/22 with confusion secondary to hypoglycemia  At discharge his Lantus was decreased to 10 units daily  Patient notes he is taking Lantus 15 units q h s   He states he would not take any Lantus for the past 2 days  Patient states he has not checked his blood sugar and over 5 days  He notes he has an adequate supply of Lantus at home with 2 refills  Patient denies any recent infection  Denies any fever  Denies any difficulty passing his urine or pain with urinating  Denies any diarrhea or vomiting  Denies any trouble breathing or coughing  Patient notes generalized weakness without any focal weakness  He notes he has been ambulating without any difficulty  Patient notes that he drinks alcohol  He is unable to quantify  He states he drinks all day and all night  His father is unable to quantify how much patient drinks  Patient denies any chest pain          Historical Information   Past Medical History:   Diagnosis Date    Alcohol abuse     Chronic pancreatitis ([facility] )     Diabetes mellitus (HCC)     Type 2    Pancreatitis     Paroxysmal A-fib (HCC)     Thrombocytopenia (HCC)      Patient Active Problem List   Diagnosis    Essential hypertension    Type 2 diabetes mellitus with hyperglycemia, with long-term current use of insulin ([facility] )    Uncomplicated alcohol dependence ([facility] )    Alcohol intoxication in active alcoholic with complication ([facility] )    Paroxysmal A-fib (HCC)    Chronic pancreatitis (HCC)    Thrombocytopenia (HCC)    History of atrial fibrillation    Acute encephalopathy    Anemia    CKD (chronic kidney disease), stage III (HCC)    Hypoglycemia    Hypokalemia    Bilateral hearing loss    Abnormal chest x-ray    Weakness     Past Surgical History:   Procedure Laterality Date    INCISION AND DRAINAGE OF WOUND N/A 8/16/2020    Procedure: INCISION AND DRAINAGE (I&D) HEAD/FACE;  Surgeon: Wong Tobar VIJAY;  Location: [facility];  Service: Maxillofacial    IR BIOPSY BONE MARROW  2/7/2019    REMOVAL OF IMPACTED TOOTH - COMPLETELY BONY N/A 8/16/2020    Procedure: EXTRACTION TEETH MULTIPLE #2, 3;  Surgeon: Maliha Mirza DMD;  Location: [facility];  Service: Maxillofacial       Social History   Social History     Substance and Sexual Activity   Alcohol Use Yes     Social History     Substance and Sexual Activity   Drug Use Yes    Types: Cocaine     Social History     Tobacco Use   Smoking Status Former Smoker   Smokeless Tobacco Never Used       Family History:   Family History   Problem Relation Age of Onset    Diabetes Mother     Hypertension Mother     Hyperlipidemia Father        Meds/Allergies       Current Facility-Administered Medications:     docusate sodium (COLACE) capsule 100 mg, 100 mg, Oral, BID, Marylen Kill, MD Earna Lais  [START ON 9/2/2022] enoxaparin (LOVENOX) subcutaneous injection 40 mg, 40 mg, Subcutaneous, Q24H [address], Marylen Kill, MD    folic acid (FOLVITE) tablet 1 mg, 1 mg, Oral, Daily, Marylen Kill, MD    insulin lispro (HumaLOG) 100 units/mL subcutaneous injection 1-5 Units, 1-5 Units, Subcutaneous, TID AC **AND** Fingerstick Glucose (POCT), , , TID AC, Marylen Kill, MD    insulin lispro (HumaLOG) 100 units/mL subcutaneous injection 1-5 Units, 1-5 Units, Subcutaneous, HS, Marylen Kill, MD    metoprolol tartrate (LOPRESSOR) partial tablet 12 5 mg, 12 5 mg, Oral, Q12H [address], Marylen Kill, MD    multivitamin-minerals (CENTRUM) tablet 1 tablet, 1 tablet, Oral, Daily, Marylen Kill, MD    ondansetron [facility]) injection 4 mg, 4 mg, Intravenous, Q6H PRN, Marylen Kill, MD    sodium chloride 0 9 % infusion, 75 mL/hr, Intravenous, Continuous, Marylen Kill, MD    thiamine tablet 100 mg, 100 mg, Oral, Daily, Marylen Kill, MD    No Known Allergies    Review of Systems  A detailed 12 point review of systems was conducted and is negative apart from those mentioned in the HPI         Objective   Vitals: Blood pressure (!) 191/91, pulse 80, temperature 97 5 °F (36 4 °C), temperature source Oral, resp  rate 16, weight 61 8 kg (136 lb 3 9 oz), SpO2 98 %  Physical Exam   General:  Pleasant male  No acute distress  Not tachypneic, non dyspneic  Father seated the bedside, with patient's permission  Patient appears hard of hearing  HEENT: EOMI, sclera anicteric, dry mucous membranes  Neck: supple,   Heart: Regular rate and rhythm, S1S2 present  No murmur, rub or gallop  Lungs; Clear to auscultation bilaterally  No wheezing, crackles or rhonchi  No accessory muscle use or respiratory distress  Abdomen: soft, non-tender, non-distended, NABS  No guarding or rebound  No peritoneal sound or mass  Extremities: no clubbing, cyanosis, or edema  2+ pedal pulses bilaterally  Full range of motion  Neurologic:  Cranial nerves II-XII intact  Strength:   5/5 bilaterally, biceps 5/5 bilaterally, ankle flexion 5/5 bilaterally, ankle extension 5/5 bilaterally, hip extension 5/5 bilaterally  Skin: warm and dry  No petechiae, purpura or rash  Lab Results:   Results from last 7 days   Lab Units 09/01/22  1031   WBC Thousand/uL 11 34*   HEMOGLOBIN g/dL 15 2   HEMATOCRIT % 41 1   PLATELETS Thousands/uL 182     Results from last 7 days   Lab Units 09/01/22  1031   POTASSIUM mmol/L 3 1*   CHLORIDE mmol/L 102   CO2 mmol/L 29   BUN mg/dL 11   CREATININE mg/dL 1 26   CALCIUM mg/dL 9 2         Imaging:  CT brain:  No acute change  Chest x-ray:  No acute abnormality    EKG:  Normal sinus rhythm  T-wave inversion lead III, avF, V3-V5  TWI seen 1/20/22 in leads III, aVF, V4-5        Code Status: Level 1 - Full Code      Disposition:  Due to patient's generalized weakness he will be admitted to the hospital   It is anticipated that is length of stay will be greater than 2 midnights and he is placed on inpatient status    Family:  Updated patient and his father at the bedside in [facility]  Discussed with patient's nurse    Portions of the record may have been created with voice recognition software

## 2022-09-01 NOTE — ASSESSMENT & PLAN NOTE
Patient is a 63-year-old male with past medical history significant for insulin-dependent diabetes, paroxysmal atrial fibrillation, and alcohol abuse who presented to the ER for evaluation of generalized weakness    · Patient reports generalized weakness, fatigue, without any focal symptoms  Poor po intake x 24h without n/v/d or abd pain  · Will be admitted to the hospital to undergo further evaluation  · A) metabolic workup:  · Check alcohol level  · Check B12/folate/TSH  · Monitor glycemic control due to his previous admission with generalized weakness secondary to hypoglycemia  · BS after admission recheck and was 39:    · Pt notes he has not checked his BS in over 5d  Has been taking his insulin, but not the past 2 days  B) infectious workup:   -patient with mild leukocytosis of 11:   Will check pan cultures due to nonspecific symptoms   -check urinalysis  c) no focal neurologic deficits on exam   -negative head CT  D) toxic metabolic weakness  -patient with history of alcohol abuse, and prior cocaine use  -check urine drug screen and alcohol level  E) cardiac:  No evidence of ischemia on EKG or enzymes

## 2022-09-01 NOTE — ED PROVIDER NOTES
History  Chief Complaint   Patient presents with    Medical Problem     Pt reports generalized weakness for the past 3 days  Denies CP/SOB/HA  Pt states to  " I am just a sick man I dont know, I dont listen well  Im weak"     55-year-old male with a history of insulin-dependent diabetes presents to generalized weakness for the past 3 days  According to his father he is having trouble getting up and walking  No chest pain, no shortness of breath, no abdominal pain, no nausea/vomiting/diarrhea, no headaches  He last took his insulin last night  He is unsure what his blood sugars have been  Patient is a poor historian          Prior to Admission Medications   Prescriptions Last Dose Informant Patient Reported? Taking?    Basaglar KwikPen 100 units/mL SOPN   No No   Sig: Inject 10 Units under the skin daily   Blood Glucose Monitoring Suppl (ACCU-CHEK GUIDE) w/Device KIT   No No   Sig: by Does not apply route 3 (three) times a day   Insulin Pen Needle (Unifine Pentips) 32G X 4 MM MISC   No No   Sig: Inject as directed daily   Lancets (accu-chek multiclix) lancets   No No   Sig: Check blood sugar 3 times a day   glucose blood (Accu-Chek Guide) test strip   No No   Sig: Check blood sugar 3 times a day   lisinopril-hydrochlorothiazide (PRINZIDE,ZESTORETIC) 20-12 5 MG per tablet   No No   Sig: Take 1 tablet by mouth daily   metoprolol tartrate (LOPRESSOR) 25 mg tablet   No No   Sig: Take 0 5 tablets (12 5 mg total) by mouth every 12 (twelve) hours      Facility-Administered Medications: None       Past Medical History:   Diagnosis Date    Alcohol abuse     Chronic pancreatitis ([facility] [address] )     Diabetes mellitus ([facility] )     Type 2    Pancreatitis     Paroxysmal A-fib (HCC)     Thrombocytopenia (HCC)        Past Surgical History:   Procedure Laterality Date    INCISION AND DRAINAGE OF WOUND N/A 8/16/2020    Procedure: INCISION AND DRAINAGE (I&D) HEAD/FACE;  Surgeon: Marc Gee DMD;  Location: [facility]; Service: Maxillofacial    IR BIOPSY BONE MARROW  2/7/2019    REMOVAL OF IMPACTED TOOTH - COMPLETELY BONY N/A 8/16/2020    Procedure: EXTRACTION TEETH MULTIPLE #2, 3;  Surgeon: Sascha Loco DMD;  Location: [facility];  Service: Maxillofacial       Family History   Problem Relation Age of Onset    Diabetes Mother     Hypertension Mother     Hyperlipidemia Father      I have reviewed and agree with the history as documented  E-Cigarette/Vaping     E-Cigarette/Vaping Substances     Social History     Tobacco Use    Smoking status: Former Smoker    Smokeless tobacco: Never Used   Substance Use Topics    Alcohol use: Yes    Drug use: Yes     Types: Cocaine       Review of Systems   Constitutional: Positive for fatigue  Negative for appetite change and fever  HENT: Negative for rhinorrhea and sore throat  Eyes: Negative for pain  Respiratory: Negative for cough, shortness of breath and wheezing  Cardiovascular: Negative for chest pain and leg swelling  Gastrointestinal: Negative for abdominal pain, diarrhea and vomiting  Genitourinary: Negative for dysuria and flank pain  Musculoskeletal: Negative for back pain and neck pain  Skin: Negative for rash  Neurological: Positive for weakness  Negative for syncope and headaches  Psychiatric/Behavioral:        Mood normal       Physical Exam  Physical Exam  Vitals and nursing note reviewed  Constitutional:       Appearance: He is well-developed  HENT:      Head: Normocephalic and atraumatic  Right Ear: External ear normal       Left Ear: External ear normal       Mouth/Throat:      Mouth: Mucous membranes are dry  Eyes:      General: No scleral icterus  Extraocular Movements: Extraocular movements intact  Cardiovascular:      Rate and Rhythm: Normal rate and regular rhythm  Pulmonary:      Effort: Pulmonary effort is normal  No respiratory distress  Breath sounds: Normal breath sounds     Abdominal:      Palpations: Abdomen is soft  Tenderness: There is no abdominal tenderness  Musculoskeletal:         General: No deformity or signs of injury  Normal range of motion  Cervical back: Normal range of motion and neck supple  Skin:     General: Skin is warm and dry  Coloration: Skin is not jaundiced or pale  Neurological:      General: No focal deficit present  Mental Status: He is alert and oriented to person, place, and time  Psychiatric:         Mood and Affect: Mood normal          Behavior: Behavior normal          Vital Signs  ED Triage Vitals [09/01/22 0918]   Temperature Pulse Respirations Blood Pressure SpO2   97 5 °F (36 4 °C) 89 16 125/86 100 %      Temp Source Heart Rate Source Patient Position - Orthostatic VS BP Location FiO2 (%)   Oral Monitor Sitting Right arm --      Pain Score       --           Vitals:    09/01/22 0918 09/01/22 1215   BP: 125/86 155/97   Pulse: 89 72   Patient Position - Orthostatic VS: Sitting Lying         Visual Acuity      ED Medications  Medications   potassium chloride (K-DUR,KLOR-CON) CR tablet 40 mEq (has no administration in time range)   aspirin chewable tablet 324 mg (has no administration in time range)   sodium chloride 0 9 % bolus 1,000 mL (1,000 mL Intravenous New Bag 9/1/22 1214)       Diagnostic Studies  Results Reviewed     Procedure Component Value Units Date/Time    HS Troponin I 2hr [[id number]]  (Normal) Collected: 09/01/22 1305    Lab Status: Final result Specimen: Blood from Arm, Left Updated: 09/01/22 1335     hs TnI 2hr 6 ng/L      Delta 2hr hsTnI 1 ng/L     HS Troponin I 4hr [[id number]]     Lab Status: No result Specimen: Blood     COVID Rapid Antigen [[id number]]  (Normal) Collected: 09/01/22 1113    Lab Status: Final result Specimen: Nares from Nose Updated: 09/01/22 1204     SARS-CoV-2 Ag Negative Presumptive    Covid19 and INFLUENZA A/B PCR [[id number]] Collected: 09/01/22 1113    Lab Status:  In process Specimen: Nares from Nose Updated: 09/01/22 1204    HS Troponin 0hr (reflex protocol) [[id number]]  (Normal) Collected: 09/01/22 1031    Lab Status: Final result Specimen: Blood from Arm, Left Updated: 09/01/22 1102     hs TnI 0hr 5 ng/L     TSH [[id number]]  (Normal) Collected: 09/01/22 1031    Lab Status: Final result Specimen: Blood from Arm, Left Updated: 09/01/22 1101     TSH 3RD GENERATON 0 897 uIU/mL     Narrative:      Patients undergoing fluorescein dye angiography may retain small amounts of fluorescein in the body for 48-72 hours post procedure  Samples containing fluorescein can produce falsely depressed TSH values  If the patient had this procedure,a specimen should be resubmitted post fluorescein clearance        Fingerstick Glucose (POCT) [[id number]]  (Normal) Collected: 09/01/22 1100    Lab Status: Final result Updated: 09/01/22 1101     POC Glucose 92 mg/dl     Protime-INR [[id number]]  (Normal) Collected: 09/01/22 1031    Lab Status: Final result Specimen: Blood from Arm, Left Updated: 09/01/22 1054     Protime 13 2 seconds      INR 1 00    APTT [[id number]]  (Normal) Collected: 09/01/22 1031    Lab Status: Final result Specimen: Blood from Arm, Left Updated: 09/01/22 1054     PTT 23 seconds     Comprehensive metabolic panel [[id number]]  (Abnormal) Collected: 09/01/22 1031    Lab Status: Final result Specimen: Blood from Arm, Left Updated: 09/01/22 1053     Sodium 142 mmol/L      Potassium 3 1 mmol/L      Chloride 102 mmol/L      CO2 29 mmol/L      ANION GAP 11 mmol/L      BUN 11 mg/dL      Creatinine 1 26 mg/dL      Glucose 99 mg/dL      Calcium 9 2 mg/dL      AST 14 U/L      ALT 28 U/L      Alkaline Phosphatase 91 U/L      Total Protein 7 6 g/dL      Albumin 3 7 g/dL      Total Bilirubin 0 41 mg/dL      eGFR 65 ml/min/1 73sq m     Narrative:      [facility] guidelines for Chronic Kidney Disease (CKD):     Stage 1 with normal or high GFR (GFR > 90 mL/min/1 73 square meters)    Stage 2 Mild CKD (GFR = 60-89 mL/min/1 73 square meters)    Stage 3A Moderate CKD (GFR = 45-59 mL/min/1 73 square meters)    Stage 3B Moderate CKD (GFR = 30-44 mL/min/1 73 square meters)    Stage 4 Severe CKD (GFR = 15-29 mL/min/1 73 square meters)    Stage 5 End Stage CKD (GFR <15 mL/min/1 73 square meters)  Note: GFR calculation is accurate only with a steady state creatinine    CBC and differential [[id number]]  (Abnormal) Collected: 09/01/22 1031    Lab Status: Final result Specimen: Blood from Arm, Left Updated: 09/01/22 1036     WBC 11 34 Thousand/uL      RBC 4 79 Million/uL      Hemoglobin 15 2 g/dL      Hematocrit 41 1 %      MCV 86 fL      MCH 31 7 pg      MCHC 37 0 g/dL      RDW 10 5 %      MPV 10 9 fL      Platelets 694 Thousands/uL      nRBC 0 /100 WBCs      Neutrophils Relative 67 %      Immat GRANS % 0 %      Lymphocytes Relative 21 %      Monocytes Relative 7 %      Eosinophils Relative 5 %      Basophils Relative 0 %      Neutrophils Absolute 7 47 Thousands/µL      Immature Grans Absolute 0 03 Thousand/uL      Lymphocytes Absolute 2 41 Thousands/µL      Monocytes Absolute 0 84 Thousand/µL      Eosinophils Absolute 0 56 Thousand/µL      Basophils Absolute 0 03 Thousands/µL     Fingerstick Glucose (POCT) [[id number]]  (Normal) Collected: 09/01/22 0930    Lab Status: Final result Updated: 09/01/22 0931     POC Glucose 128 mg/dl                  XR chest 1 view portable   Final Result by Robin Nugent MD (09/01 1306)      No acute cardiopulmonary disease  Workstation performed: [id number]         CT head without contrast   Final Result by Joey Seo MD (09/01 1044)      No acute intracranial abnormality                    Workstation performed: [id number]                    Procedures  ECG 12 Lead Documentation Only    Date/Time: 9/1/2022 10:53 AM  Performed by: Samantha Munoz MD  Authorized by: Samantha Munoz MD     Rate:     ECG rate:  72    ECG rate assessment: normal    Rhythm:     Rhythm: sinus rhythm    Comments:      New T wave inversion laterally and inferiorly             ED Course                                             MDM  Number of Diagnoses or Management Options     Amount and/or Complexity of Data Reviewed  Clinical lab tests: ordered and reviewed  Tests in the radiology section of CPT®: ordered and reviewed    Risk of Complications, Morbidity, and/or Mortality  Presenting problems: moderate  General comments: ekg has new t wave invesion inferiorly and laterally  - admitted to the hospitalist for further workup/management        Disposition  Final diagnoses:   Generalized weakness   EKG abnormalities     Time reflects when diagnosis was documented in both MDM as applicable and the Disposition within this note     Time User Action Codes Description Comment    9/1/2022  2:15 PM Gissel Stewart Add [R53 1] Generalized weakness     9/1/2022  2:15 PM Esha Stewart Add [R94 31] EKG abnormalities       ED Disposition     ED Disposition   Admit    Condition   Stable    Date/Time   Thu Sep 1, 2022 12:32 PM    Comment   Case was discussed with EUGENIO  and the patient's admission status was agreed to be obs /tele   Follow-up Information    None         Patient's Medications   Discharge Prescriptions    No medications on file       No discharge procedures on file      PDMP Review       Value Time User    PDMP Reviewed  Yes 8/18/2020 11:39 AM Zuly Nam MD          ED Provider  Electronically Signed by           Angelique Avila MD  09/01/22 4927

## 2022-09-01 NOTE — ASSESSMENT & PLAN NOTE
Lab Results   Component Value Date    EGFR 65 09/01/2022    EGFR 86 07/30/2022    EGFR 75 07/29/2022    CREATININE 1 26 09/01/2022    CREATININE 1 00 07/30/2022    CREATININE 1 12 07/29/2022     · Creatinine currently at baseline of 1 0-1 2

## 2022-09-01 NOTE — ASSESSMENT & PLAN NOTE
· Patient has essential hypertension  · Continue home medications with lisinopril/HCTZ 20-12 5 mg daily, and metoprolol 12 5 mg q 12 hours  · Continue medications and monitor, titrate as needed

## 2022-09-01 NOTE — ASSESSMENT & PLAN NOTE
· Patient has paroxysmal atrial fibrillation  · Is prescribed metoprolol 12 5 mg b i d   · Noted to have medication and follow-up noncompliance and is not currently prescribed anticoagulation, with h/o alcohol abuse

## 2022-09-02 LAB
ANION GAP SERPL CALCULATED.3IONS-SCNC: 10 MMOL/L (ref 4–13)
BUN SERPL-MCNC: 12 MG/DL (ref 5–25)
CALCIUM SERPL-MCNC: 8.5 MG/DL (ref 8.3–10.1)
CHLORIDE SERPL-SCNC: 105 MMOL/L (ref 96–108)
CO2 SERPL-SCNC: 26 MMOL/L (ref 21–32)
CORTIS SERPL-MCNC: 14.2 UG/DL
CREAT SERPL-MCNC: 1.04 MG/DL (ref 0.6–1.3)
ERYTHROCYTE [DISTWIDTH] IN BLOOD BY AUTOMATED COUNT: 10.3 % (ref 11.6–15.1)
FLUAV RNA RESP QL NAA+PROBE: NEGATIVE
FLUBV RNA RESP QL NAA+PROBE: NEGATIVE
GFR SERPL CREATININE-BSD FRML MDRD: 82 ML/MIN/1.73SQ M
GLUCOSE SERPL-MCNC: 173 MG/DL (ref 65–140)
GLUCOSE SERPL-MCNC: 200 MG/DL (ref 65–140)
GLUCOSE SERPL-MCNC: 258 MG/DL (ref 65–140)
GLUCOSE SERPL-MCNC: 349 MG/DL (ref 65–140)
GLUCOSE SERPL-MCNC: 56 MG/DL (ref 65–140)
GLUCOSE SERPL-MCNC: 61 MG/DL (ref 65–140)
HCT VFR BLD AUTO: 37.1 % (ref 36.5–49.3)
HGB BLD-MCNC: 13.6 G/DL (ref 12–17)
MAGNESIUM SERPL-MCNC: 1.6 MG/DL (ref 1.6–2.6)
MCH RBC QN AUTO: 31.6 PG (ref 26.8–34.3)
MCHC RBC AUTO-ENTMCNC: 36.7 G/DL (ref 31.4–37.4)
MCV RBC AUTO: 86 FL (ref 82–98)
PLATELET # BLD AUTO: 182 THOUSANDS/UL (ref 149–390)
PMV BLD AUTO: 11.5 FL (ref 8.9–12.7)
POTASSIUM SERPL-SCNC: 3.1 MMOL/L (ref 3.5–5.3)
RBC # BLD AUTO: 4.31 MILLION/UL (ref 3.88–5.62)
SARS-COV-2 RNA RESP QL NAA+PROBE: NEGATIVE
SODIUM SERPL-SCNC: 141 MMOL/L (ref 135–147)
WBC # BLD AUTO: 11.23 THOUSAND/UL (ref 4.31–10.16)

## 2022-09-02 PROCEDURE — 83735 ASSAY OF MAGNESIUM: CPT | Performed by: INTERNAL MEDICINE

## 2022-09-02 PROCEDURE — 99232 SBSQ HOSP IP/OBS MODERATE 35: CPT | Performed by: INTERNAL MEDICINE

## 2022-09-02 PROCEDURE — 82533 TOTAL CORTISOL: CPT | Performed by: INTERNAL MEDICINE

## 2022-09-02 PROCEDURE — 97166 OT EVAL MOD COMPLEX 45 MIN: CPT

## 2022-09-02 PROCEDURE — 82948 REAGENT STRIP/BLOOD GLUCOSE: CPT

## 2022-09-02 PROCEDURE — 97163 PT EVAL HIGH COMPLEX 45 MIN: CPT

## 2022-09-02 PROCEDURE — 85027 COMPLETE CBC AUTOMATED: CPT | Performed by: INTERNAL MEDICINE

## 2022-09-02 PROCEDURE — 80048 BASIC METABOLIC PNL TOTAL CA: CPT | Performed by: INTERNAL MEDICINE

## 2022-09-02 RX ORDER — KETOROLAC TROMETHAMINE 30 MG/ML
30 INJECTION, SOLUTION INTRAMUSCULAR; INTRAVENOUS ONCE
Status: COMPLETED | OUTPATIENT
Start: 2022-09-02 | End: 2022-09-02

## 2022-09-02 RX ORDER — POTASSIUM CHLORIDE 20 MEQ/1
40 TABLET, EXTENDED RELEASE ORAL 2 TIMES DAILY
Status: DISCONTINUED | OUTPATIENT
Start: 2022-09-02 | End: 2022-09-04 | Stop reason: HOSPADM

## 2022-09-02 RX ORDER — PANTOPRAZOLE SODIUM 40 MG/1
40 TABLET, DELAYED RELEASE ORAL
Status: DISCONTINUED | OUTPATIENT
Start: 2022-09-02 | End: 2022-09-04 | Stop reason: HOSPADM

## 2022-09-02 RX ORDER — ACETAMINOPHEN 325 MG/1
975 TABLET ORAL EVERY 6 HOURS PRN
Status: DISCONTINUED | OUTPATIENT
Start: 2022-09-02 | End: 2022-09-04 | Stop reason: HOSPADM

## 2022-09-02 RX ORDER — MAGNESIUM SULFATE HEPTAHYDRATE 40 MG/ML
2 INJECTION, SOLUTION INTRAVENOUS ONCE
Status: COMPLETED | OUTPATIENT
Start: 2022-09-02 | End: 2022-09-02

## 2022-09-02 RX ADMIN — ENOXAPARIN SODIUM 40 MG: 40 INJECTION SUBCUTANEOUS at 09:17

## 2022-09-02 RX ADMIN — Medication 12.5 MG: at 22:13

## 2022-09-02 RX ADMIN — POTASSIUM CHLORIDE 40 MEQ: 1500 TABLET, EXTENDED RELEASE ORAL at 09:17

## 2022-09-02 RX ADMIN — SODIUM CHLORIDE 75 ML/HR: 0.9 INJECTION, SOLUTION INTRAVENOUS at 22:14

## 2022-09-02 RX ADMIN — FOLIC ACID 1 MG: 1 TABLET ORAL at 09:17

## 2022-09-02 RX ADMIN — PANTOPRAZOLE SODIUM 40 MG: 40 TABLET, DELAYED RELEASE ORAL at 17:03

## 2022-09-02 RX ADMIN — KETOROLAC TROMETHAMINE 30 MG: 30 INJECTION, SOLUTION INTRAMUSCULAR at 22:13

## 2022-09-02 RX ADMIN — THIAMINE HCL TAB 100 MG 100 MG: 100 TAB at 09:17

## 2022-09-02 RX ADMIN — Medication 12.5 MG: at 09:17

## 2022-09-02 RX ADMIN — Medication 1 TABLET: at 09:17

## 2022-09-02 RX ADMIN — POTASSIUM CHLORIDE 40 MEQ: 1500 TABLET, EXTENDED RELEASE ORAL at 17:03

## 2022-09-02 RX ADMIN — SODIUM CHLORIDE 75 ML/HR: 0.9 INJECTION, SOLUTION INTRAVENOUS at 06:42

## 2022-09-02 RX ADMIN — MAGNESIUM SULFATE HEPTAHYDRATE 2 G: 40 INJECTION, SOLUTION INTRAVENOUS at 17:03

## 2022-09-02 NOTE — ASSESSMENT & PLAN NOTE
· Patient has a history of alcohol abuse  · Notes he drinks every day, all day    Unable to quantify further  · Pre-contemplative on cessation  · Check alcohol level  · Continue CIWA scale, thiamine, folic acid

## 2022-09-02 NOTE — PLAN OF CARE
Problem: Potential for Falls  Goal: Patient will remain free of falls  Description: INTERVENTIONS:  - Educate patient/family on patient safety including physical limitations  - Instruct patient to call for assistance with activity   - Consult OT/PT to assist with strengthening/mobility   - Keep Call bell within reach  - Keep bed low and locked with side rails adjusted as appropriate  - Keep care items and personal belongings within reach  - Initiate and maintain comfort rounds  - Make Fall Risk Sign visible to staff  - Offer Toileting every 2 Hours, in advance of need  - Initiate/Maintain bed alarm  - Obtain necessary fall risk management equipment  - Apply yellow socks and bracelet for high fall risk patients  - Consider moving patient to room near nurses station  Outcome: Progressing     Problem: MOBILITY - ADULT  Goal: Maintain or return to baseline ADL function  Description: INTERVENTIONS:  -  Assess patient's ability to carry out ADLs; assess patient's baseline for ADL function and identify physical deficits which impact ability to perform ADLs (bathing, care of mouth/teeth, toileting, grooming, dressing, etc )  - Assess/evaluate cause of self-care deficits   - Assess range of motion  - Assess patient's mobility; develop plan if impaired  - Assess patient's need for assistive devices and provide as appropriate  - Encourage maximum independence but intervene and supervise when necessary  - Involve family in performance of ADLs  - Assess for home care needs following discharge   - Consider OT consult to assist with ADL evaluation and planning for discharge  - Provide patient education as appropriate  Outcome: Progressing  Goal: Maintains/Returns to pre admission functional level  Description: INTERVENTIONS:  - Perform BMAT or MOVE assessment daily    - Set and communicate daily mobility goal to care team and patient/family/caregiver     - Collaborate with rehabilitation services on mobility goals if consulted  - Perform Range of Motion 2 times a day  - Reposition patient every 2 hours  - Dangle patient 2 times a day  - Stand patient 2 times a day  - Ambulate patient 2 times a day  - Out of bed to chair 2 times a day   - Out of bed for meals 2 times a day  - Out of bed for toileting  - Record patient progress and toleration of activity level   Outcome: Progressing     Problem: SUBSTANCE USE/ABUSE  Goal: By discharge, patient will have ongoing treatment plan addressing chemical dependency  Description: INTERVENTIONS:  - Assist patient with resources and/or appointments for ongoing recovery based living  Outcome: Progressing     Problem: PAIN - ADULT  Goal: Verbalizes/displays adequate comfort level or baseline comfort level  Description: Interventions:  - Encourage patient to monitor pain and request assistance  - Assess pain using appropriate pain scale  - Administer analgesics based on type and severity of pain and evaluate response  - Implement non-pharmacological measures as appropriate and evaluate response  - Consider cultural and social influences on pain and pain management  - Notify physician/advanced practitioner if interventions unsuccessful or patient reports new pain  Outcome: Progressing     Problem: SAFETY ADULT  Goal: Patient will remain free of falls  Description: INTERVENTIONS:  - Educate patient/family on patient safety including physical limitations  - Instruct patient to call for assistance with activity   - Consult OT/PT to assist with strengthening/mobility   - Keep Call bell within reach  - Keep bed low and locked with side rails adjusted as appropriate  - Keep care items and personal belongings within reach  - Initiate and maintain comfort rounds  - Make Fall Risk Sign visible to staff  - Offer Toileting every 2 Hours, in advance of need  - Initiate/Maintain bed alarm  - Obtain necessary fall risk management equipment    - Apply yellow socks and bracelet for high fall risk patients  - Consider moving patient to room near nurses station  Outcome: Progressing  Goal: Maintain or return to baseline ADL function  Description: INTERVENTIONS:  -  Assess patient's ability to carry out ADLs; assess patient's baseline for ADL function and identify physical deficits which impact ability to perform ADLs (bathing, care of mouth/teeth, toileting, grooming, dressing, etc )  - Assess/evaluate cause of self-care deficits   - Assess range of motion  - Assess patient's mobility; develop plan if impaired  - Assess patient's need for assistive devices and provide as appropriate  - Encourage maximum independence but intervene and supervise when necessary  - Involve family in performance of ADLs  - Assess for home care needs following discharge   - Consider OT consult to assist with ADL evaluation and planning for discharge  - Provide patient education as appropriate  Outcome: Progressing  Goal: Maintains/Returns to pre admission functional level  Description: INTERVENTIONS:  - Perform BMAT or MOVE assessment daily    - Set and communicate daily mobility goal to care team and patient/family/caregiver  - Collaborate with rehabilitation services on mobility goals if consulted  - Perform Range of Motion 2 times a day  - Reposition patient every 2 hours    - Dangle patient 2 times a day  - Stand patient 2 times a day  - Ambulate patient 2 times a day  - Out of bed to chair 2 times a day   - Out of bed for meals 2 times a day  - Out of bed for toileting  - Record patient progress and toleration of activity level   Outcome: Progressing     Problem: DISCHARGE PLANNING  Goal: Discharge to home or other facility with appropriate resources  Description: INTERVENTIONS:  - Identify barriers to discharge w/patient and caregiver  - Arrange for needed discharge resources and transportation as appropriate  - Identify discharge learning needs (meds, wound care, etc )  - Arrange for interpretive services to assist at discharge as needed  - Refer to Case Management Department for coordinating discharge planning if the patient needs post-hospital services based on physician/advanced practitioner order or complex needs related to functional status, cognitive ability, or social support system  Outcome: Progressing     Problem: Knowledge Deficit  Goal: Patient/family/caregiver demonstrates understanding of disease process, treatment plan, medications, and discharge instructions  Description: Complete learning assessment and assess knowledge base    Interventions:  - Provide teaching at level of understanding  - Provide teaching via preferred learning methods  Outcome: Progressing

## 2022-09-02 NOTE — ASSESSMENT & PLAN NOTE
Lab Results   Component Value Date    EGFR 82 09/02/2022    EGFR 65 09/01/2022    EGFR 86 07/30/2022    CREATININE 1 04 09/02/2022    CREATININE 1 26 09/01/2022    CREATININE 1 00 07/30/2022     · Creatinine currently at baseline of 1 0-1 2

## 2022-09-02 NOTE — UTILIZATION REVIEW
Initial Clinical Review    Admission: Date/Time/Statement:   Admission Orders (From admission, onward)     Ordered        09/01/22 Dillon Sprague 1159  Once            09/01/22 1231  Place in Observation  Once,   Status:  Canceled                      Orders Placed This Encounter   Procedures    INPATIENT ADMISSION     Standing Status:   Standing     Number of Occurrences:   1     Order Specific Question:   Level of Care     Answer:   Med Surg [16]     Order Specific Question:   Estimated length of stay     Answer:   More than 2 Midnights     Order Specific Question:   Certification     Answer:   I certify that inpatient services are medically necessary for this patient for a duration of greater than two midnights  See H&P and MD Progress Notes for additional information about the patient's course of treatment  ED Arrival Information     Expected   -    Arrival   9/1/2022 09:10    Acuity   Urgent            Means of arrival   Walk-In    Escorted by   Family Member    Service   Hospitalist    Admission type   Urgent            Arrival complaint   Back pain,Weakness           Chief Complaint   Patient presents with    Medical Problem     Pt reports generalized weakness for the past 3 days  Denies CP/SOB/HA  Pt states to  " I am just a sick man I dont know, I dont listen well  Im weak"       Initial Presentation: 46 y o  male PMH alcohol abuse chronic pancreatitis DM type 2, paroxysmal Afib, thrombocytopenia  Patient c/o generalized weakness, fatigue, without any focal symptoms  Has not check BS in over 5d , has not taken insulin for 2 days  Infectious workup, mild leukocytosis of 11, pan cx, check ua     Alcohol drug abuse check urine drug screen  Admitted Inpatient DM type 2 BS after admission recheck was 39   Hypokalemia replete and monitor   afib non compliance with meds not on anticoagulaiton 2/2 h/o alcohol abuse  Alcohol dependence   CIWA drinks qd all day   HTN continue home meds lisinopril/HCTZ    Date: 9/2/22   Day 2:     ED Triage Vitals   Temperature Pulse Respirations Blood Pressure SpO2   09/01/22 0918 09/01/22 0918 09/01/22 0918 09/01/22 0918 09/01/22 0918   97 5 °F (36 4 °C) 89 16 125/86 100 %      Temp Source Heart Rate Source Patient Position - Orthostatic VS BP Location FiO2 (%)   09/01/22 0918 09/01/22 0918 09/01/22 0918 09/01/22 0918 --   Oral Monitor Sitting Right arm       Pain Score       09/01/22 1425       No Pain          Wt Readings from Last 1 Encounters:   09/02/22 63 5 kg (139 lb 15 9 oz)     Additional Vital Signs:   09/02/22 07:25:19 97 6 °F (36 4 °C) 65 16 153/84 107 97 % -- --   09/02/22 05:17:32 -- 69 -- 152/86 108 98 % -- --   09/02/22 0000 -- 64 -- 152/86 -- -- -- --   09/01/22 20:42:53 98 5 °F (36 9 °C) 66 20 153/87 109 98 % None (Room air) Lying   09/01/22 2030 -- 65 -- 153/87 -- 98 % None (Room air) --   09/01/22 16:38:32 97 7 °F (36 5 °C) 70 16 188/91 Abnormal  123 98 % -- --   09/01/22 1536 -- 80 16 191/91 Abnormal  -- 98 % None (Room air) Lying   09/01/22 1425 -- 75 16 171/82 Abnormal  -- --         Pertinent Labs/Diagnostic Test Results:   EKG:  Normal sinus rhythm  T-wave inversion lead III, avF, V3-V5  TWI seen 1/20/22 in leads III, aVF, V4-5  XR chest 1 view portable   Final Result by Jordi Leggett MD (09/01 1306)      No acute cardiopulmonary disease  Workstation performed: [id number]         CT head without contrast   Final Result by Babita Martinez MD (09/01 7064)      No acute intracranial abnormality                    Workstation performed: [id number]               Results from last 7 days   Lab Units 09/02/22  0523 09/01/22  1031   WBC Thousand/uL 11 23* 11 34*   HEMOGLOBIN g/dL 13 6 15 2   HEMATOCRIT % 37 1 41 1   PLATELETS Thousands/uL 182 182   NEUTROS ABS Thousands/µL  --  7 47     Results from last 7 days   Lab Units 09/02/22  0523 09/01/22  1757 09/01/22  1031   SODIUM mmol/L 141  --  142   POTASSIUM mmol/L 3 1*  -- 3 1*   CHLORIDE mmol/L 105  --  102   CO2 mmol/L 26  --  29   ANION GAP mmol/L 10  --  11   BUN mg/dL 12  --  11   CREATININE mg/dL 1 04  --  1 26   EGFR ml/min/1 73sq m 82  --  65   CALCIUM mg/dL 8 5  --  9 2   MAGNESIUM mg/dL 1 6 1 3*  --      Results from last 7 days   Lab Units 09/01/22  1031   AST U/L 14   ALT U/L 28   ALK PHOS U/L 91   TOTAL PROTEIN g/dL 7 6   ALBUMIN g/dL 3 7   TOTAL BILIRUBIN mg/dL 0 41     Results from last 7 days   Lab Units 09/02/22  0827 09/02/22  0723 09/01/22  2041 09/01/22  1704 09/01/22  1626 09/01/22  1100 09/01/22  0930   POC GLUCOSE mg/dl 173* 61* 227* 227* 39* 92 128     Results from last 7 days   Lab Units 09/02/22  0523 09/01/22  1031   GLUCOSE RANDOM mg/dL 56* 99     Results from last 7 days   Lab Units 09/01/22  1425 09/01/22  1305 09/01/22  1031   HS TNI 0HR ng/L  --   --  5   HS TNI 2HR ng/L  --  6  --    HSTNI D2 ng/L  --  1  --    HS TNI 4HR ng/L 5  --   --    HSTNI D4 ng/L 0  --   --      Results from last 7 days   Lab Units 09/01/22  1031   PROTIME seconds 13 2   INR  1 00   PTT seconds 23     Results from last 7 days   Lab Units 09/01/22  1757 09/01/22  1031   TSH 3RD GENERATON uIU/mL 0 544 0 897     Results from last 7 days   Lab Units 09/01/22  1645   AMPH/METH  Negative   BARBITURATE UR  Negative   BENZODIAZEPINE UR  Negative   COCAINE UR  Positive*   METHADONE URINE  Negative   OPIATE UR  Negative   PCP UR  Negative   THC UR  Negative     Results from last 7 days   Lab Units 09/01/22  1738   ETHANOL LVL mg/dL <3     Results from last 7 days   Lab Units 09/01/22  1738   BLOOD CULTURE  Received in Microbiology Lab  Culture in Progress  Received in Microbiology Lab  Culture in Progress         ED Treatment:   Medication Administration from 09/01/2022 0910 to 09/01/2022 1601       Date/Time Order Dose Route Action Action by Comments     09/01/2022 1400 sodium chloride 0 9 % bolus 1,000 mL 0 mL Intravenous Stopped Jean Paz RN      09/01/2022 1215 sodium chloride 0 9 % bolus 1,000 mL 1,000 mL Intravenous New Bag Cayla Muñoz RN      09/01/2022 1421 potassium chloride (K-DUR,KLOR-CON) CR tablet 40 mEq 40 mEq Oral Given Cayla Muñoz RN      09/01/2022 1420 aspirin chewable tablet 324 mg 324 mg Oral Given Cayla Muñoz RN         Past Medical History:   Diagnosis Date    Alcohol abuse     Chronic pancreatitis ([facility] )     Diabetes mellitus ([facility] )     Type 2    Pancreatitis     Paroxysmal A-fib (HCC)     Thrombocytopenia (HCC)      Present on Admission:   CKD (chronic kidney disease), stage III (HCC)   Essential hypertension   Paroxysmal A-fib (HCC)   Uncomplicated alcohol dependence (HCC)   Hypokalemia      Admitting Diagnosis: Back pain [M54 9]  Weakness [R53 1]  EKG abnormalities [R94 31]  Generalized weakness [R53 1]  Age/Sex: 46 y o  male  Admission Orders:  gmf  CIWA  Cortisol  ua cs  Daily weight I/o    Scheduled Medications:  docusate sodium, 100 mg, Oral, BID  enoxaparin, 40 mg, Subcutaneous, Z77H JAISON  folic acid, 1 mg, Oral, Daily  metoprolol tartrate, 12 5 mg, Oral, Q12H [address]  multivitamin-minerals, 1 tablet, Oral, Daily  potassium chloride, 40 mEq, Oral, BID  thiamine, 100 mg, Oral, Daily      Continuous IV Infusions:  sodium chloride, 75 mL/hr, Intravenous, Continuous      PRN Meds:  ondansetron, 4 mg, Intravenous, Q6H PRN        None    Network Utilization Review Department  ATTENTION: Please call with any questions or concerns to [phone] and carefully listen to the prompts so that you are directed to the right person  All voicemails are confidential   Cleotis Dirk all requests for admission clinical reviews, approved or denied determinations and any other requests to dedicated fax number below belonging to the campus where the patient is receiving treatment   List of dedicated fax numbers for the Facilities:  FACILITY NAME HEBERT [address] DENIALS (Administrative/Medical Necessity) [phone] [address] (Maternity/NICU/Pediatrics) 261 [facility],7Th Floor [facility] 40 125 [facility]  [phone]   [address] [facility] [place] [address] [id number] [place] [address] [place] Doris Anand 1481 [address] [facility] [facility] [phone]

## 2022-09-02 NOTE — PROGRESS NOTES
[address] [facility]  Progress Note - [address] 1970, 46 y o  male MRN: [id number]  Unit/Bed#: [address] 87 214-01 Encounter: [id number]  Primary Care Provider: ARIANE Meza   Date and time admitted to hospital: 9/1/2022  9:21 AM    * Weakness  Assessment & Plan  Patient is a 45-year-old male with past medical history significant for insulin-dependent diabetes, paroxysmal atrial fibrillation, and alcohol abuse who presented to the ER for evaluation of generalized weakness    · Patient reports generalized weakness, fatigue, without any focal symptoms  Poor po intake x 24h without n/v/d or abd pain  · Will be admitted to the hospital to undergo further evaluation  · A) metabolic workup: Likely due to hypoglycemia  · undetectable alcohol level  · B12/folate without deficit  · TSH wnl  · Normal cortisol  · Monitor glycemic control due to his previous admission with generalized weakness secondary to hypoglycemia  · BS after admission recheck and was 39:    · Pt notes he has not checked his BS in over 5d    Has been taking his insulin, but not the past 2 days  B) infectious workup:   -patient with mild leukocytosis of 11:    -blood cx neg thus far   - CXR negative   - urinalysis negative for UTI  c) no focal neurologic deficits on exam   -negative head CT  D) toxic metabolic weakness  -patient with history of alcohol abuse:  Level udetectable on admission  -positive cocaine use  E) cardiac:  No evidence of ischemia on EKG or enzymes    Type 2 diabetes mellitus with stage 3 chronic kidney disease, with long-term current use of insulin [facility])  Assessment & Plan  Lab Results   Component Value Date    HGBA1C 6 7 (H) 07/30/2022       Recent Labs     09/01/22  2041 09/02/22  0723 09/02/22  0827 09/02/22  1121   POCGLU 227* 61* 173* 200*       Blood Sugar Average: Last 72 hrs:  (P) 143 375     · Patient has diabetes type 2, with long-term use of insulin, with associated chronic kidney disease stage 3  · Patient has a history of variable compliance  · Prior admissions for hypoglycemia  · On previous discharge he was instructed to take Lantus 10 units daily, but was taking 15u still  · Pt was noted to have hypoglycemia after admission with a blood sugar of 39  · Patient was tolerating p o  without any miss meals but had a subsequent blood sugar of 61 on the morning of 9/2  · Cortisol level normal  · Continue to monitor off all insulin:  If he has persistent hyperglycemia will undergo further workup    Hypokalemia  Assessment & Plan  · Patient with hypokalemia  · Replete and recheck  · Replete magnesium    CKD (chronic kidney disease), stage III [facility])  Assessment & Plan  Lab Results   Component Value Date    EGFR 82 09/02/2022    EGFR 65 09/01/2022    EGFR 86 07/30/2022    CREATININE 1 04 09/02/2022    CREATININE 1 26 09/01/2022    CREATININE 1 00 07/30/2022     · Creatinine currently at baseline of 1 0-1 2    Paroxysmal A-fib ([facility] )  Assessment & Plan  · Patient has paroxysmal atrial fibrillation  · Is prescribed metoprolol 12 5 mg b i d   · Noted to have medication and follow-up noncompliance and is not currently prescribed anticoagulation, with h/o alcohol abuse    Uncomplicated alcohol dependence ([facility] )  Assessment & Plan  · Patient has a history of alcohol abuse  · Notes he drinks every day, all day  Unable to quantify further  · Pre-contemplative on cessation  · Check alcohol level  · Continue CIWA scale, thiamine, folic acid    Essential hypertension  Assessment & Plan  · Patient has essential hypertension  · Continue home medications with lisinopril/HCTZ 20-12 5 mg daily, and metoprolol 12 5 mg q 12 hours  · Continue medications and monitor, titrate as needed            Family:  Called pts father Charise Nissen and LM on cell phone to call my cell phone for update  Called his home phone    Woman who answered said he is not there    VTE Pharmacologic Prophylaxis: Enoxaparin (Lovenox)  VTE Mechanical Prophylaxis: sequential compression device        Certification Statement: The patient will continue to require additional inpatient hospital stay due to need for further acute intervention for hypoglycemia, hypokalemia    Status: inpatient     ===================================================================    Subjective:  Patient is examined and interviewed by me in Arabic the bedside  He notes he has pain across his lower back  He also notes pain across his abdomen  He denies any nausea or vomiting  Notes he is tolerating p o  without any difficulty  He notes he has shortness of breath  Denies any cough         Physical Exam:   Temp:  [97 6 °F (36 4 °C)-98 5 °F (36 9 °C)] 98 °F (36 7 °C)  HR:  [64-70] 65  Resp:  [16-20] 20  BP: (152-188)/(84-91) 152/84    Gen:  Pleasant, non-tachypnic, non-dyspnic  Conversant  Heart: regular rate and rhythm, S1S2 present, no murmur, rub or gallop  Lungs: clear to ausculatation bilaterally  No wheezing, crackles, or rhonchi  No accessory muscle use or respiratory distress  Abd: soft, + mildly-tender across anterior abdomen with palpation, non-distended  NABS, no guarding, rebound or peritoneal signs  Extremities: no clubbing, cyanosis or edema  2+pedal pulses bilaterally  Neuro: awake, alert  Interactive  Fluent speech  Skin: warm and dry: no petechiae, purpura and rash      LABS:   Results from last 7 days   Lab Units 09/02/22  0523 09/01/22  1031   WBC Thousand/uL 11 23* 11 34*   HEMOGLOBIN g/dL 13 6 15 2   HEMATOCRIT % 37 1 41 1   PLATELETS Thousands/uL 182 182     Results from last 7 days   Lab Units 09/02/22  0523 09/01/22  1031   POTASSIUM mmol/L 3 1* 3 1*   CHLORIDE mmol/L 105 102   CO2 mmol/L 26 29   BUN mg/dL 12 11   CREATININE mg/dL 1 04 1 26   CALCIUM mg/dL 8 5 9 2       Hospital Data:    9/1:  Chest x-ray:  No acute cardiopulmonary disease    9/1:  CT head:  No acute intracranial abnormality    Microbiology  9/1 blood culture:  Pending x2  9/1: Negative COVID  9/1:  Negative COVID, influenza, RSV      ---------------------------------------------------------------------------------------------------------------  This note has been constructed using a voice recognition system

## 2022-09-02 NOTE — PHYSICAL THERAPY NOTE
PT EVALUATION    Pt  Name: Jose De Jesus Hua  Pt  Age: 46 y o  MRN: [id number]  LENGTH OF STAY: 1      Admitting Diagnoses:   Back pain [M54 9]  Weakness [R53 1]  EKG abnormalities [R94 31]  Generalized weakness [R53 1]    Past Medical History:   Diagnosis Date    Alcohol abuse     Chronic pancreatitis ([facility] )     Diabetes mellitus ([facility] )     Type 2    Pancreatitis     Paroxysmal A-fib (HCC)     Thrombocytopenia ([facility] )        Past Surgical History:   Procedure Laterality Date    INCISION AND DRAINAGE OF WOUND N/A 8/16/2020    Procedure: INCISION AND DRAINAGE (I&D) HEAD/FACE;  Surgeon: Maliha Mirza DMD;  Location: [facility];  Service: Maxillofacial    IR BIOPSY BONE MARROW  2/7/2019    REMOVAL OF IMPACTED TOOTH - COMPLETELY BONY N/A 8/16/2020    Procedure: EXTRACTION TEETH MULTIPLE #2, 3;  Surgeon: Maliha Mirza DMD;  Location: [facility];  Service: Maxillofacial       Imaging Studies:  XR chest 1 view portable   Final Result by Danica Joyce MD (09/01 1306)      No acute cardiopulmonary disease  Workstation performed: [id number]         CT head without contrast   Final Result by Adalid Nixon MD (09/01 1044)      No acute intracranial abnormality                    Workstation performed: [id number]               09/02/22 1033   PT Last Visit   PT Visit Date 09/02/22   Note Type   Note type Evaluation   Pain Assessment   Pain Score No Pain   Restrictions/Precautions   Weight Bearing Precautions Per Order No   Other Precautions Multiple lines   Home Living   Type of Home Apartment   Home Layout One level;Stairs to enter with rails  (8-9STE)   Bathroom Equipment Other (Comment)  (no DME)   Home Equipment Other (Comment)  (no DME)   Prior Function   Level of [place] Independent with ADLs and functional mobility  (w/o AD)   Lives With Family  (parents)   Receives Help From Family   ADL Assistance Independent   Vocational Unemployed   Comments (-)    General   Family/Caregiver Present No   Cognition   Overall Cognitive Status WFL   Arousal/Participation Alert   Orientation Level Oriented X4   Following Commands Follows all commands and directions without difficulty   Comments pt pleasant & cooperative; pt Azeri speaking only; attempted to use IPAD Catglobe  however pt preferred in-person ; SUSAN Duncan able to provide translation   Subjective   Subjective Pt agreeable to PT/OT evals  RUE Assessment   RUE Assessment   (refer to OT)   LUE Assessment   LUE Assessment   (refer to OT)   RLE Assessment   RLE Assessment WFL  (4/5 grossly; dec effort noted)   LLE Assessment   LLE Assessment WFL  (4/5 grossly; dec effort noted)   Coordination   Movements are Fluid and Coordinated 1   Sensation X  (chronic intermittent LLE tingling)   Bed Mobility   Supine to Sit 7  Independent   Sit to Supine 7  Independent   Transfers   Sit to Stand 7  Independent   Stand to Sit 7  Independent   Ambulation/Elevation   Gait pattern WNL   Gait Assistance 7  Independent   Assistive Device None   Distance 120'x1   Stair Management Assistance 6  Modified independent   Stair Management Technique One rail R;Alternating pattern;Reciprocal   Number of Stairs 5  (limited to IV lines)   Ambulation/Elevation Additional Comments steady gait   Balance   Static Sitting Normal   Dynamic Sitting Normal   Static Standing Good   Dynamic Standing Fair +   Ambulatory Fair +   Activity Tolerance   Activity Tolerance Patient tolerated treatment well   Medical Staff Made Aware [address]   Nurse Made Aware RN Adeline Courtney   Assessment   Prognosis Good   Problem List Impaired sensation   Assessment Pt 46 y o  male presented w/ generalized weakness & fatigue  Pt admitted for Weakness, hypokalemia, hyperglycemia & PAF  Pt referred to PT for mobility assessment & D/C planning  Please see flowsheet above re: home set-up, PLOF & objective findings during PT assessment  PTA, pt reports being I w/o AD    On eval, pt demonstrates no significant decline in function to warrant skilled PT at this time  Pt  I w/ overall functional mobility including amb & stair mgt  Balance & gait WFL  Pt states being at his functional baseline and states no concerns about going back home when medically cleared  The patient's AM-PAC Basic Mobility Inpatient Short Form Raw Score is 24  A Raw score of greater than 16 suggests the patient may benefit from discharge to home  Please also refer to the recommendation of the Physical Therapist for safe discharge planning  From PT standpoint, pt may return home when medically cleared  Will D/C PT  Pt may ambulate in unit as tolerated to prevent decline in function  Nsg notified  Barriers to Discharge None   Goals   Patient Goals to go home   PT Treatment Day 0   Plan   Treatment/Interventions Spoke to nursing;OT  (PT eval)   PT Frequency Other (Comment)  (D/C PT)   Recommendation   PT Discharge Recommendation No rehabilitation needs   AM-PAC Basic Mobility Inpatient   Turning in Bed Without Bedrails 4   Lying on Back to Sitting on Edge of Flat Bed 4   Moving Bed to Chair 4   Standing Up From Chair 4   Walk in Room 4   Climb 3-5 Stairs 4   Basic Mobility Inpatient Raw Score 24   Basic Mobility Standardized Score 57 68   Highest Level Of Mobility   JH-HLM Goal 8: Walk 250 feet or more   JH-HLM Achieved 7: Walk 25 feet or more   End of Consult   Patient Position at End of Consult Supine; All needs within reach   End of Consult Comments Pt in stable condition  All needs in reach  All lines intact     Hx/personal factors: co-morbidities, inaccessible home, and mutliple lines  Examination: assessed body system, balance, endurance, amb, D/C disposition & fall risk  Clinical: unpredictable (ongoing medical status and abnormal lab values)  Complexity: high    Jaime Edwards, PT

## 2022-09-02 NOTE — ASSESSMENT & PLAN NOTE
Patient is a 63-year-old male with past medical history significant for insulin-dependent diabetes, paroxysmal atrial fibrillation, and alcohol abuse who presented to the ER for evaluation of generalized weakness    · Patient reports generalized weakness, fatigue, without any focal symptoms  Poor po intake x 24h without n/v/d or abd pain  · Will be admitted to the hospital to undergo further evaluation  · A) metabolic workup: Likely due to hypoglycemia  · undetectable alcohol level  · B12/folate without deficit  · TSH wnl  · Normal cortisol  · Monitor glycemic control due to his previous admission with generalized weakness secondary to hypoglycemia  · BS after admission recheck and was 39:    · Pt notes he has not checked his BS in over 5d    Has been taking his insulin, but not the past 2 days  B) infectious workup:   -patient with mild leukocytosis of 11:    -blood cx neg thus far   - CXR negative   - urinalysis negative for UTI  c) no focal neurologic deficits on exam   -negative head CT  D) toxic metabolic weakness  -patient with history of alcohol abuse:  Level udetectable on admission  -positive cocaine use  E) cardiac:  No evidence of ischemia on EKG or enzymes

## 2022-09-02 NOTE — UTILIZATION REVIEW
Continued Stay Review    Date:    9/2/22                          Current Patient Class:   Inpatient  Current Level of Care:    Med surg    HPI:52 y o  male initially admitted on    9/1  With  Weakness, DM    Assessment/Plan:   9/2   Continues to complain of pain  Across lower back, abdomen  Tolerating po  Still with shortness of breath  Continue CIWA  Scores  Continue to monitor labs  Replace electrolytes as needed  Continue IVF         Vital Signs:   98 °F (36 7 °C) 65 20 152/84 107 98 % None (Room air) --    09/02/22 1100 -- 57 -- 152/86 -- -- -- --   09/02/22 07:25:19 97 6 °F (36 4 °C) 65 16 153/84 107 97 % -- --   09/02/22 05:17:32 -- 69 -- 152/86 108 98 % -- --         Pertinent Labs/Diagnostic Results:   Results from last 7 days   Lab Units 09/01/22  1113   SARS-COV-2  Negative     Results from last 7 days   Lab Units 09/02/22  0523 09/01/22  1031   WBC Thousand/uL 11 23* 11 34*   HEMOGLOBIN g/dL 13 6 15 2   HEMATOCRIT % 37 1 41 1   PLATELETS Thousands/uL 182 182   NEUTROS ABS Thousands/µL  --  7 47         Results from last 7 days   Lab Units 09/02/22  0523 09/01/22  1757 09/01/22  1031   SODIUM mmol/L 141  --  142   POTASSIUM mmol/L 3 1*  --  3 1*   CHLORIDE mmol/L 105  --  102   CO2 mmol/L 26  --  29   ANION GAP mmol/L 10  --  11   BUN mg/dL 12  --  11   CREATININE mg/dL 1 04  --  1 26   EGFR ml/min/1 73sq m 82  --  65   CALCIUM mg/dL 8 5  --  9 2   MAGNESIUM mg/dL 1 6 1 3*  --      Results from last 7 days   Lab Units 09/01/22  1031   AST U/L 14   ALT U/L 28   ALK PHOS U/L 91   TOTAL PROTEIN g/dL 7 6   ALBUMIN g/dL 3 7   TOTAL BILIRUBIN mg/dL 0 41     Results from last 7 days   Lab Units 09/02/22  1615 09/02/22  1121 09/02/22  0827 09/02/22  0723 09/01/22  2041 09/01/22  1704 09/01/22  1626 09/01/22  1100 09/01/22  0930   POC GLUCOSE mg/dl 258* 200* 173* 61* 227* 227* 39* 92 128     Results from last 7 days   Lab Units 09/02/22  0523 09/01/22  1031   GLUCOSE RANDOM mg/dL 56* 99 Results from last 7 days   Lab Units 09/01/22  1425 09/01/22  1305 09/01/22  1031   HS TNI 0HR ng/L  --   --  5   HS TNI 2HR ng/L  --  6  --    HSTNI D2 ng/L  --  1  --    HS TNI 4HR ng/L 5  --   --    HSTNI D4 ng/L 0  --   --          Results from last 7 days   Lab Units 09/01/22  1031   PROTIME seconds 13 2   INR  1 00   PTT seconds 23     Results from last 7 days   Lab Units 09/01/22  1757 09/01/22  1031   TSH 3RD GENERATON uIU/mL 0 544 0 897               Results from last 7 days   Lab Units 09/01/22  1113   INFLUENZA A PCR  Negative   INFLUENZA B PCR  Negative         Results from last 7 days   Lab Units 09/01/22  1645   AMPH/METH  Negative   BARBITURATE UR  Negative   BENZODIAZEPINE UR  Negative   COCAINE UR  Positive*   METHADONE URINE  Negative   OPIATE UR  Negative   PCP UR  Negative   THC UR  Negative     Results from last 7 days   Lab Units 09/01/22  1738   ETHANOL LVL mg/dL <3                 Results from last 7 days   Lab Units 09/01/22  1738   BLOOD CULTURE  Received in Microbiology Lab  Culture in Progress  Received in Microbiology Lab  Culture in Progress  Medications:   Scheduled Medications:  docusate sodium, 100 mg, Oral, BID  enoxaparin, 40 mg, Subcutaneous, T76K JAISON  folic acid, 1 mg, Oral, Daily  magnesium sulfate, 2 g, Intravenous, Once  metoprolol tartrate, 12 5 mg, Oral, Q12H [address]  multivitamin-minerals, 1 tablet, Oral, Daily  pantoprazole, 40 mg, Oral, Early Morning  potassium chloride, 40 mEq, Oral, BID  thiamine, 100 mg, Oral, Daily      Continuous IV Infusions:  sodium chloride, 75 mL/hr, Intravenous, Continuous      PRN Meds:  ondansetron, 4 mg, Intravenous, Q6H PRN        Discharge Plan:    TBD    Network Utilization Review Department  ATTENTION: Please call with any questions or concerns to [phone] and carefully listen to the prompts so that you are directed to the right person   All voicemails are confidential   Lisa Solis all requests for admission clinical reviews, approved or denied determinations and any other requests to dedicated fax number below belonging to the campus where the patient is receiving treatment   List of dedicated fax numbers for the Facilities:  [address] [place] [address] DENIALS (Administrative/Medical Necessity) [phone]   [address] (Maternity/NICU/Pediatrics) [phone]   [address] [place] [address]  [address] [facility] [place] [address] [id number] [address] Doris Zimmerman [address] [facility] [place] [address] [phone]

## 2022-09-02 NOTE — ASSESSMENT & PLAN NOTE
Lab Results   Component Value Date    HGBA1C 6 7 (H) 07/30/2022       Recent Labs     09/01/22  2041 09/02/22  0723 09/02/22  0827 09/02/22  1121   POCGLU 227* 61* 173* 200*       Blood Sugar Average: Last 72 hrs:  (P) 143 375     · Patient has diabetes type 2, with long-term use of insulin, with associated chronic kidney disease stage 3  · Patient has a history of variable compliance  · Prior admissions for hypoglycemia  · On previous discharge he was instructed to take Lantus 10 units daily, but was taking 15u still  · Pt was noted to have hypoglycemia after admission with a blood sugar of 39  · Patient was tolerating p o  without any miss meals but had a subsequent blood sugar of 61 on the morning of 9/2  · Cortisol level normal  · Continue to monitor off all insulin:  If he has persistent hyperglycemia will undergo further workup

## 2022-09-02 NOTE — OCCUPATIONAL THERAPY NOTE
Occupational Therapy Evaluation(time=7024-8341)     Patient Name: Charise Nissen  LRPTS'E Date: 9/2/2022  Problem List  Principal Problem:    Weakness  Active Problems:    Essential hypertension    Type 2 diabetes mellitus without complication, with long-term current use of insulin ([facility] )    Uncomplicated alcohol dependence (HCC)    Paroxysmal A-fib (HCC)    CKD (chronic kidney disease), stage III ([facility] )    Hypokalemia    Past Medical History  Past Medical History:   Diagnosis Date    Alcohol abuse     Chronic pancreatitis ([facility] )     Diabetes mellitus ([facility] )     Type 2    Pancreatitis     Paroxysmal A-fib ([facility] )     Thrombocytopenia ([facility] )      Past Surgical History  Past Surgical History:   Procedure Laterality Date    INCISION AND DRAINAGE OF WOUND N/A 8/16/2020    Procedure: INCISION AND DRAINAGE (I&D) HEAD/FACE;  Surgeon: Elvis Mcdowell DMD;  Location: [facility];  Service: Maxillofacial    IR BIOPSY BONE MARROW  2/7/2019    REMOVAL OF IMPACTED TOOTH - COMPLETELY BONY N/A 8/16/2020    Procedure: EXTRACTION TEETH MULTIPLE #2, 3;  Surgeon: Elvis Mcdowell DMD;  Location: [facility];  Service: Maxillofacial           09/02/22 1017   Note Type   Note type Evaluation   Restrictions/Precautions   Weight Bearing Precautions Per Order No   Other Precautions Multiple lines   Pain Assessment   Pain Assessment Tool 0-10   Pain Score No Pain   Home Living   Type of [address] One level  (8-9ste with railing)   Home Equipment   (denies)   Prior Function   Lives With Other (Comment)  (parents)   ADL Assistance Independent   Falls in the last 6 months 0   Lifestyle   Autonomy PTA pt states independence with his ADLs, transfers, ambulation--w/o device; neg home alone, neg falls   Reciprocal Relationships supportive family   Service to Others worked as a    Intrinsic Gratification watching TV   Psychosocial   Psychosocial (WDL) WDL   Subjective   Subjective "I feel better today "   ADL   Where Assessed Edge of bed   Eating Assistance 6  Modified independent   Grooming Assistance 6  Modified Independent   UB Bathing Assistance 6  Modified Independent   LB Bathing Assistance 6  Modified Independent   UB Dressing Assistance 6  Modified independent   LB Dressing Assistance 6  Modified independent   Bed Mobility   Rolling R 7  Independent   Rolling L 7  Independent   Supine to Sit 7  Independent   Sit to Supine 7  Independent   Transfers   Sit to Stand 7  Independent   Stand to Sit 7  Independent   Functional Mobility   Functional Mobility 7  Independent   Additional items   (w/o device)   Balance   Static Sitting Normal   Dynamic Sitting Normal   Static Standing Good   Dynamic Standing Fair +   Activity Tolerance   Activity Tolerance Patient tolerated treatment well   Medical Staff Made Aware nsg, P T    RUE Assessment   RUE Assessment WFL   RUE Strength   RUE Overall Strength Within Functional Limits - able to perform ADL tasks with strength  (4/5 throughout; limited pt effort)   LUE Assessment   LUE Assessment WFL   LUE Strength   LUE Overall Strength Within Functional Limits - able to perform ADL tasks with strength   Hand Function   Gross Motor Coordination Functional   Fine Motor Coordination Functional   Sensation   Light Touch No apparent deficits   Proprioception   Proprioception No apparent deficits   Vision-Basic Assessment   Current Vision   (glasses)   Vision - Complex Assessment   Acuity Able to read clock/calendar on wall without difficulty   Perception   Inattention/Neglect Appears intact   Cognition   Overall Cognitive Status WFL   Arousal/Participation Alert   Attention Within functional limits   Orientation Level Oriented X4   Memory Within functional limits   Following Commands Follows all commands and directions without difficulty   Assessment   Limitation Decreased endurance   Prognosis Good   Assessment Pt is a 51y/o male admitted to the hospital 2* symptoms of weakness; +cocaine use   Pt with PMH ETOH, substance abuse, DM, A-fib, and pancreatitis  PTA pt states independence with his ADLs, transfers, ambulation--w/o device; neg home alone, neg falls  During initial eval, pt demonstrated slight deficits with his functional balance, b/l UE strength, and activity tolerance  Pt was able to demonstrate good ADL status and states no concerns about going directly home  Pt would benefit from a restorative program on the unit to improve his overall endurance and balance  Acute OT tx not indicated at this time 2* limited ADL deficits     Goals   Patient Goals "to go home"   Plan   OT Frequency Eval only   Recommendation   OT Discharge Recommendation No rehabilitation needs   AM-PAC Daily Activity Inpatient   Lower Body Dressing 4   Bathing 4   Toileting 4   Upper Body Dressing 4   Grooming 4   Eating 4   Daily Activity Raw Score 24   Daily Activity Standardized Score (Calc for Raw Score >=11) 57 54   AM-PAC Applied Cognition Inpatient   Following a Speech/Presentation 4   Understanding Ordinary Conversation 4   Taking Medications 4   Remembering Where Things Are Placed or Put Away 4   Remembering List of 4-5 Errands 4   Taking Care of Complicated Tasks 4   Applied Cognition Raw Score 24   Applied Cognition Standardized Score 62 21   Chilo Capellan, OT

## 2022-09-03 LAB
ANION GAP SERPL CALCULATED.3IONS-SCNC: 9 MMOL/L (ref 4–13)
BUN SERPL-MCNC: 13 MG/DL (ref 5–25)
CALCIUM SERPL-MCNC: 8.2 MG/DL (ref 8.3–10.1)
CHLORIDE SERPL-SCNC: 104 MMOL/L (ref 96–108)
CO2 SERPL-SCNC: 23 MMOL/L (ref 21–32)
CREAT SERPL-MCNC: 1.09 MG/DL (ref 0.6–1.3)
GFR SERPL CREATININE-BSD FRML MDRD: 77 ML/MIN/1.73SQ M
GLUCOSE SERPL-MCNC: 102 MG/DL (ref 65–140)
GLUCOSE SERPL-MCNC: 260 MG/DL (ref 65–140)
GLUCOSE SERPL-MCNC: 301 MG/DL (ref 65–140)
GLUCOSE SERPL-MCNC: 305 MG/DL (ref 65–140)
GLUCOSE SERPL-MCNC: 419 MG/DL (ref 65–140)
GLUCOSE SERPL-MCNC: 450 MG/DL (ref 65–140)
MAGNESIUM SERPL-MCNC: 1.9 MG/DL (ref 1.6–2.6)
POTASSIUM SERPL-SCNC: 3.7 MMOL/L (ref 3.5–5.3)
SODIUM SERPL-SCNC: 136 MMOL/L (ref 135–147)

## 2022-09-03 PROCEDURE — 99232 SBSQ HOSP IP/OBS MODERATE 35: CPT | Performed by: INTERNAL MEDICINE

## 2022-09-03 PROCEDURE — 82948 REAGENT STRIP/BLOOD GLUCOSE: CPT

## 2022-09-03 PROCEDURE — 80048 BASIC METABOLIC PNL TOTAL CA: CPT | Performed by: INTERNAL MEDICINE

## 2022-09-03 PROCEDURE — 83735 ASSAY OF MAGNESIUM: CPT | Performed by: INTERNAL MEDICINE

## 2022-09-03 RX ORDER — INSULIN GLARGINE 100 [IU]/ML
5 INJECTION, SOLUTION SUBCUTANEOUS EVERY MORNING
Status: DISCONTINUED | OUTPATIENT
Start: 2022-09-03 | End: 2022-09-04 | Stop reason: HOSPADM

## 2022-09-03 RX ORDER — INSULIN LISPRO 100 [IU]/ML
1-5 INJECTION, SOLUTION INTRAVENOUS; SUBCUTANEOUS
Status: DISCONTINUED | OUTPATIENT
Start: 2022-09-03 | End: 2022-09-04 | Stop reason: HOSPADM

## 2022-09-03 RX ADMIN — POTASSIUM CHLORIDE 40 MEQ: 1500 TABLET, EXTENDED RELEASE ORAL at 16:58

## 2022-09-03 RX ADMIN — Medication 1 TABLET: at 09:15

## 2022-09-03 RX ADMIN — SODIUM CHLORIDE 75 ML/HR: 0.9 INJECTION, SOLUTION INTRAVENOUS at 09:15

## 2022-09-03 RX ADMIN — THIAMINE HCL TAB 100 MG 100 MG: 100 TAB at 09:15

## 2022-09-03 RX ADMIN — FOLIC ACID 1 MG: 1 TABLET ORAL at 09:15

## 2022-09-03 RX ADMIN — POTASSIUM CHLORIDE 40 MEQ: 1500 TABLET, EXTENDED RELEASE ORAL at 09:15

## 2022-09-03 RX ADMIN — ENOXAPARIN SODIUM 40 MG: 40 INJECTION SUBCUTANEOUS at 09:15

## 2022-09-03 RX ADMIN — Medication 12.5 MG: at 09:15

## 2022-09-03 RX ADMIN — INSULIN LISPRO 5 UNITS: 100 INJECTION, SOLUTION INTRAVENOUS; SUBCUTANEOUS at 18:29

## 2022-09-03 RX ADMIN — INSULIN LISPRO 5 UNITS: 100 INJECTION, SOLUTION INTRAVENOUS; SUBCUTANEOUS at 16:58

## 2022-09-03 RX ADMIN — INSULIN GLARGINE 5 UNITS: 100 INJECTION, SOLUTION SUBCUTANEOUS at 09:15

## 2022-09-03 RX ADMIN — PANTOPRAZOLE SODIUM 40 MG: 40 TABLET, DELAYED RELEASE ORAL at 05:12

## 2022-09-03 RX ADMIN — Medication 12.5 MG: at 20:46

## 2022-09-03 RX ADMIN — SODIUM CHLORIDE 75 ML/HR: 0.9 INJECTION, SOLUTION INTRAVENOUS at 22:57

## 2022-09-03 NOTE — ASSESSMENT & PLAN NOTE
Patient is a 59-year-old male with past medical history significant for insulin-dependent diabetes, paroxysmal atrial fibrillation, and alcohol abuse who presented to the ER for evaluation of generalized weakness    · Patient reports generalized weakness, fatigue, without any focal symptoms  Poor po intake x 24h without n/v/d or abd pain  · Will be admitted to the hospital to undergo further evaluation  · A) metabolic workup: Likely due to hypoglycemia  · undetectable alcohol level  · B12/folate without deficit  · TSH wnl  · Normal cortisol  · Monitor glycemic control due to his previous admission with generalized weakness secondary to hypoglycemia  · BS after admission recheck and was 39:    · Pt notes he has not checked his BS in over 5d    Has been taking his insulin, but not the past 2 days  B) infectious workup:   -patient with mild leukocytosis of 11:    -blood cx neg thus far   - CXR negative   - urinalysis negative for UTI  c) no focal neurologic deficits on exam   -negative head CT  D) toxic metabolic weakness  -patient with history of alcohol abuse:  Level udetectable on admission  -positive cocaine use  E) cardiac:  No evidence of ischemia on EKG or enzymes

## 2022-09-03 NOTE — PLAN OF CARE
Problem: Potential for Falls  Goal: Patient will remain free of falls  Description: INTERVENTIONS:  - Educate patient/family on patient safety including physical limitations  - Instruct patient to call for assistance with activity   - Consult OT/PT to assist with strengthening/mobility   - Keep Call bell within reach  - Keep bed low and locked with side rails adjusted as appropriate  - Keep care items and personal belongings within reach  - Initiate and maintain comfort rounds  - Make Fall Risk Sign visible to staff  - Offer Toileting every  Hours, in advance of need  - Initiate/Maintain alarm  - Obtain necessary fall risk management equipment:   - Apply yellow socks and bracelet for high fall risk patients  - Consider moving patient to room near nurses station  Outcome: Progressing     Problem: MOBILITY - ADULT  Goal: Maintain or return to baseline ADL function  Description: INTERVENTIONS:  -  Assess patient's ability to carry out ADLs; assess patient's baseline for ADL function and identify physical deficits which impact ability to perform ADLs (bathing, care of mouth/teeth, toileting, grooming, dressing, etc )  - Assess/evaluate cause of self-care deficits   - Assess range of motion  - Assess patient's mobility; develop plan if impaired  - Assess patient's need for assistive devices and provide as appropriate  - Encourage maximum independence but intervene and supervise when necessary  - Involve family in performance of ADLs  - Assess for home care needs following discharge   - Consider OT consult to assist with ADL evaluation and planning for discharge  - Provide patient education as appropriate  Outcome: Progressing  Goal: Maintains/Returns to pre admission functional level  Description: INTERVENTIONS:  - Perform BMAT or MOVE assessment daily    - Set and communicate daily mobility goal to care team and patient/family/caregiver     - Collaborate with rehabilitation services on mobility goals if consulted  - Perform Range of Motion  times a day  - Reposition patient every  hours    - Dangle patient  times a day  - Stand patient  times a day  - Ambulate patient  times a day  - Out of bed to chair  times a day   - Out of bed for meals  times a day  - Out of bed for toileting  - Record patient progress and toleration of activity level   Outcome: Progressing     Problem: SUBSTANCE USE/ABUSE  Goal: By discharge, patient will have ongoing treatment plan addressing chemical dependency  Description: INTERVENTIONS:  - Assist patient with resources and/or appointments for ongoing recovery based living  Outcome: Progressing     Problem: PAIN - ADULT  Goal: Verbalizes/displays adequate comfort level or baseline comfort level  Description: Interventions:  - Encourage patient to monitor pain and request assistance  - Assess pain using appropriate pain scale  - Administer analgesics based on type and severity of pain and evaluate response  - Implement non-pharmacological measures as appropriate and evaluate response  - Consider cultural and social influences on pain and pain management  - Notify physician/advanced practitioner if interventions unsuccessful or patient reports new pain  Outcome: Progressing     Problem: DISCHARGE PLANNING  Goal: Discharge to home or other facility with appropriate resources  Description: INTERVENTIONS:  - Identify barriers to discharge w/patient and caregiver  - Arrange for needed discharge resources and transportation as appropriate  - Identify discharge learning needs (meds, wound care, etc )  - Arrange for interpretive services to assist at discharge as needed  - Refer to Case Management Department for coordinating discharge planning if the patient needs post-hospital services based on physician/advanced practitioner order or complex needs related to functional status, cognitive ability, or social support system  Outcome: Progressing     Problem: SAFETY ADULT  Goal: Patient will remain free of falls  Description: INTERVENTIONS:  - Educate patient/family on patient safety including physical limitations  - Instruct patient to call for assistance with activity   - Consult OT/PT to assist with strengthening/mobility   - Keep Call bell within reach  - Keep bed low and locked with side rails adjusted as appropriate  - Keep care items and personal belongings within reach  - Initiate and maintain comfort rounds  - Make Fall Risk Sign visible to staff  - Offer Toileting every  Hours, in advance of need  - Initiate/Maintain alarm  - Obtain necessary fall risk management equipment:   - Apply yellow socks and bracelet for high fall risk patients  - Consider moving patient to room near nurses station  Outcome: Progressing  Goal: Maintain or return to baseline ADL function  Description: INTERVENTIONS:  -  Assess patient's ability to carry out ADLs; assess patient's baseline for ADL function and identify physical deficits which impact ability to perform ADLs (bathing, care of mouth/teeth, toileting, grooming, dressing, etc )  - Assess/evaluate cause of self-care deficits   - Assess range of motion  - Assess patient's mobility; develop plan if impaired  - Assess patient's need for assistive devices and provide as appropriate  - Encourage maximum independence but intervene and supervise when necessary  - Involve family in performance of ADLs  - Assess for home care needs following discharge   - Consider OT consult to assist with ADL evaluation and planning for discharge  - Provide patient education as appropriate  Outcome: Progressing  Goal: Maintains/Returns to pre admission functional level  Description: INTERVENTIONS:  - Perform BMAT or MOVE assessment daily    - Set and communicate daily mobility goal to care team and patient/family/caregiver  - Collaborate with rehabilitation services on mobility goals if consulted  - Perform Range of Motion  times a day  - Reposition patient every  hours    - Dangle patient  times a day  - Stand patient  times a day  - Ambulate patient  times a day  - Out of bed to chair  times a day   - Out of bed for meals  times a day  - Out of bed for toileting  - Record patient progress and toleration of activity level   Outcome: Progressing

## 2022-09-03 NOTE — NURSING NOTE
PM glucose before dinner tray given to pt at 1713 was 419 after 5u Humalog given for  at 1447  Reached out to [facility] who asked additional coverage 5 units be given and to continue to monitor

## 2022-09-03 NOTE — PROGRESS NOTES
[facility]  Progress Note - [address] 1970, 46 y o  male MRN: [id number]  Unit/Bed#: [address] 87 214-01 Encounter: [id number]  Primary Care Provider: ARIANE Young   Date and time admitted to hospital: 9/1/2022  9:21 AM    * Weakness  Assessment & Plan  Patient is a 79-year-old male with past medical history significant for insulin-dependent diabetes, paroxysmal atrial fibrillation, and alcohol abuse who presented to the ER for evaluation of generalized weakness    · Patient reports generalized weakness, fatigue, without any focal symptoms  Poor po intake x 24h without n/v/d or abd pain  · Will be admitted to the hospital to undergo further evaluation  · A) metabolic workup: Likely due to hypoglycemia  · undetectable alcohol level  · B12/folate without deficit  · TSH wnl  · Normal cortisol  · Monitor glycemic control due to his previous admission with generalized weakness secondary to hypoglycemia  · BS after admission recheck and was 39:    · Pt notes he has not checked his BS in over 5d    Has been taking his insulin, but not the past 2 days  B) infectious workup:   -patient with mild leukocytosis of 11:    -blood cx neg thus far   - CXR negative   - urinalysis negative for UTI  c) no focal neurologic deficits on exam   -negative head CT  D) toxic metabolic weakness  -patient with history of alcohol abuse:  Level udetectable on admission  -positive cocaine use  E) cardiac:  No evidence of ischemia on EKG or enzymes    Type 2 diabetes mellitus with stage 3 chronic kidney disease, with long-term current use of insulin [facility])  Assessment & Plan  Lab Results   Component Value Date    HGBA1C 6 7 (H) 07/30/2022       Recent Labs     09/03/22  0731 09/03/22  1102 09/03/22  1447 09/03/22  1713   POCGLU 260* 305* 450* 419*       Blood Sugar Average: Last 72 hrs:  (P) 227 [id number]     · Patient has diabetes type 2, with long-term use of insulin, with associated chronic kidney disease stage 3  · Patient has a history of variable compliance  · Prior admissions for hypoglycemia  · On previous discharge he was instructed to take Lantus 10 units daily, but was taking 15u still  · Pt was noted to have hypoglycemia after admission with a blood sugar of 39  · Patient was tolerating p o  without any miss meals but had a subsequent blood sugar of 61 on the morning of 9/2  · Cortisol level normal  · Patient was monitored off all insulin, to evaluate if he had persistent hypoglycemia and would be workup for insulinoma ex cetera  · Patient had subsequent hyperglycemia  His insulin regimen is restarted however his blood sugars not controlled  · Continue to monitor and titrate    Hypokalemia  Assessment & Plan  · Patient with hypokalemia  · Repleted and improved  · Repleted magnesium    CKD (chronic kidney disease), stage III [facility])  Assessment & Plan  Lab Results   Component Value Date    EGFR 77 09/03/2022    EGFR 82 09/02/2022    EGFR 65 09/01/2022    CREATININE 1 09 09/03/2022    CREATININE 1 04 09/02/2022    CREATININE 1 26 09/01/2022     · Creatinine currently at baseline of 1 0-1 2    Paroxysmal A-fib ([facility] )  Assessment & Plan  · Patient has paroxysmal atrial fibrillation  · Is prescribed metoprolol 12 5 mg b i d   · Noted to have medication and follow-up noncompliance and is not currently prescribed anticoagulation, with h/o alcohol abuse    Uncomplicated alcohol dependence ([facility] )  Assessment & Plan  · Patient has a history of alcohol abuse  · Notes he drinks every day, all day    Unable to quantify further  · Pre-contemplative on cessation  · Alcohol level undetectable on admission  · Continue CIWA scale, thiamine, folic acid    Essential hypertension  Assessment & Plan  · Patient has essential hypertension  · Continue home medications with lisinopril/HCTZ 20-12 5 mg daily, and metoprolol 12 5 mg q 12 hours  · Continue medications and monitor, titrate as needed            Family:  Called father Jose  Called his home phone and gave update    VTE Pharmacologic Prophylaxis: Enoxaparin (Lovenox)  VTE Mechanical Prophylaxis: sequential compression device    Discussed with patient's nurse  Discussed with     Certification Statement: The patient will continue to require additional inpatient hospital stay due to need for further acute intervention for hyperglycemia/hypoglycemia    Status: inpatient     ===================================================================    Subjective:  Patient is examined and interviewed by me in Sinhala the bedside  He denies any pain anywhere  He denies any abdominal pain, nausea, vomiting, diarrhea  He is tolerating p o  Denies any shortness of breath or cough      Physical Exam:   Temp:  [97 5 °F (36 4 °C)-98 1 °F (36 7 °C)] 97 5 °F (36 4 °C)  HR:  [60-66] 60  Resp:  [17-18] 17  BP: (157-161)/(62-82) 158/80    Gen:  Pleasant, non-tachypnic, non-dyspnic  Conversant  Heart: regular rate and rhythm, S1S2 present, no murmur, rub or gallop  Lungs: clear to ausculatation bilaterally  No wheezing, crackles, or rhonchi  No accessory muscle use or respiratory distress  Abd: soft, non-tender, non-distended  NABS, no guarding, rebound or peritoneal signs  Extremities: no clubbing, cyanosis or edema  2+pedal pulses bilaterally  Full range of motion  Neuro: awake, alert  Hard of hearing    Interactive       LABS:   Results from last 7 days   Lab Units 09/02/22  0523 09/01/22  1031   WBC Thousand/uL 11 23* 11 34*   HEMOGLOBIN g/dL 13 6 15 2   HEMATOCRIT % 37 1 41 1   PLATELETS Thousands/uL 182 182     Results from last 7 days   Lab Units 09/03/22  0511 09/02/22  0523 09/01/22  1031   POTASSIUM mmol/L 3 7 3 1* 3 1*   CHLORIDE mmol/L 104 105 102   CO2 mmol/L 23 26 29   BUN mg/dL 13 12 11   CREATININE mg/dL 1 09 1 04 1 26   CALCIUM mg/dL 8 2* 8 5 9 2       Hospital Data:    9/1:  Chest x-ray:  No acute cardiopulmonary disease     9/1:  CT head:  No acute intracranial abnormality     Microbiology  9/1 blood culture:  Pending x2  9/1:  Negative COVID  9/1:  Negative COVID, influenza, RSV        ---------------------------------------------------------------------------------------------------------------  This note has been constructed using a voice recognition system

## 2022-09-03 NOTE — ASSESSMENT & PLAN NOTE
Lab Results   Component Value Date    EGFR 77 09/03/2022    EGFR 82 09/02/2022    EGFR 65 09/01/2022    CREATININE 1 09 09/03/2022    CREATININE 1 04 09/02/2022    CREATININE 1 26 09/01/2022     · Creatinine currently at baseline of 1 0-1 2

## 2022-09-03 NOTE — ASSESSMENT & PLAN NOTE
· Patient has a history of alcohol abuse  · Notes he drinks every day, all day    Unable to quantify further  · Pre-contemplative on cessation  · Alcohol level undetectable on admission  · Continue CIWA scale, thiamine, folic acid

## 2022-09-03 NOTE — ASSESSMENT & PLAN NOTE
Lab Results   Component Value Date    HGBA1C 6 7 (H) 07/30/2022       Recent Labs     09/03/22  0731 09/03/22  1102 09/03/22  1447 09/03/22  1713   POCGLU 260* 305* 450* 419*       Blood Sugar Average: Last 72 hrs:  (P) 962 [id number]     · Patient has diabetes type 2, with long-term use of insulin, with associated chronic kidney disease stage 3  · Patient has a history of variable compliance  · Prior admissions for hypoglycemia  · On previous discharge he was instructed to take Lantus 10 units daily, but was taking 15u still  · Pt was noted to have hypoglycemia after admission with a blood sugar of 39  · Patient was tolerating p o  without any miss meals but had a subsequent blood sugar of 61 on the morning of 9/2  · Cortisol level normal  · Patient was monitored off all insulin, to evaluate if he had persistent hypoglycemia and would be workup for insulinoma ex cetera  · Patient had subsequent hyperglycemia    His insulin regimen is restarted however his blood sugars not controlled  · Continue to monitor and titrate

## 2022-09-03 NOTE — UTILIZATION REVIEW
Inpatient Admission Authorization Request   NOTIFICATION OF INPATIENT ADMISSION/INPATIENT AUTHORIZATION REQUEST   SERVICING FACILITY:   [address] [place] [address], [facility], [facility]  Tax ID: [id number]  NPI: [id number]  Place of Service: Inpatient [facility]  [address]  Place of Service Code: 24     ATTENDING PROVIDER:  Attending Name and NPI#: Jayne [place], [place] [[id number]]  Address: [address] [place] [address], [facility], [facility]  Phone: [phone]     UTILIZATION REVIEW CONTACT:  Tomer Forde Utilization   Network Utilization Review Department  Phone: [phone]  Fax: [phone]  Email: [email or web address]     PHYSICIAN ADVISORY SERVICES:  FOR OFFS-TI-[facility]WH REVIEW - MEDICAL NECESSITY DENIAL  Phone: [phone]  Fax: [phone]  Email: [email or web address]     TYPE OF REQUEST:  Inpatient Status     ADMISSION INFORMATION:  ADMISSION DATE/TIME: 9/1/22  2:15 PM  PATIENT DIAGNOSIS CODE/DESCRIPTION:  Back pain [M54 9]  Weakness [R53 1]  EKG abnormalities [R94 31]  Generalized weakness [R53 1]  DISCHARGE DATE/TIME: No discharge date for patient encounter  IMPORTANT INFORMATION:  Please contact Tomer Forde directly with any questions or concerns regarding this request  Department voicemails are confidential     Send requests for admission clinical reviews, concurrent reviews, approvals, and administrative denials due to lack of clinical to fax [phone]

## 2022-09-04 VITALS
DIASTOLIC BLOOD PRESSURE: 74 MMHG | WEIGHT: 140.43 LBS | OXYGEN SATURATION: 98 % | BODY MASS INDEX: 23.4 KG/M2 | HEART RATE: 65 BPM | SYSTOLIC BLOOD PRESSURE: 145 MMHG | TEMPERATURE: 98.2 F | HEIGHT: 65 IN | RESPIRATION RATE: 17 BRPM

## 2022-09-04 PROBLEM — F14.90 COCAINE USE: Status: ACTIVE | Noted: 2022-09-04

## 2022-09-04 PROBLEM — R53.1 WEAKNESS: Status: RESOLVED | Noted: 2022-09-01 | Resolved: 2022-09-04

## 2022-09-04 LAB
GLUCOSE SERPL-MCNC: 146 MG/DL (ref 65–140)
GLUCOSE SERPL-MCNC: 186 MG/DL (ref 65–140)
GLUCOSE SERPL-MCNC: 208 MG/DL (ref 65–140)
GLUCOSE SERPL-MCNC: 342 MG/DL (ref 65–140)
GLUCOSE SERPL-MCNC: 448 MG/DL (ref 65–140)

## 2022-09-04 PROCEDURE — 82948 REAGENT STRIP/BLOOD GLUCOSE: CPT

## 2022-09-04 PROCEDURE — 99239 HOSP IP/OBS DSCHRG MGMT >30: CPT | Performed by: INTERNAL MEDICINE

## 2022-09-04 RX ORDER — PEN NEEDLE, DIABETIC 32GX 5/32"
NEEDLE, DISPOSABLE MISCELLANEOUS DAILY
Qty: 100 EACH | Refills: 0 | Status: SHIPPED | OUTPATIENT
Start: 2022-09-04 | End: 2022-10-24

## 2022-09-04 RX ORDER — BLOOD SUGAR DIAGNOSTIC
STRIP MISCELLANEOUS
Qty: 100 EACH | Refills: 0 | Status: SHIPPED | OUTPATIENT
Start: 2022-09-04

## 2022-09-04 RX ORDER — BLOOD-GLUCOSE METER
EACH MISCELLANEOUS 3 TIMES DAILY
Qty: 1 KIT | Refills: 0 | Status: SHIPPED | OUTPATIENT
Start: 2022-09-04

## 2022-09-04 RX ORDER — INSULIN LISPRO 100 [IU]/ML
5 INJECTION, SOLUTION INTRAVENOUS; SUBCUTANEOUS
Status: DISCONTINUED | OUTPATIENT
Start: 2022-09-04 | End: 2022-09-04 | Stop reason: HOSPADM

## 2022-09-04 RX ORDER — LANCETS
EACH MISCELLANEOUS
Qty: 100 EACH | Refills: 0 | Status: SHIPPED | OUTPATIENT
Start: 2022-09-04

## 2022-09-04 RX ORDER — INSULIN GLARGINE 100 [IU]/ML
10 INJECTION, SOLUTION SUBCUTANEOUS DAILY
Qty: 3 ML | Refills: 0 | Status: SHIPPED | OUTPATIENT
Start: 2022-09-04 | End: 2022-10-04

## 2022-09-04 RX ADMIN — INSULIN GLARGINE 5 UNITS: 100 INJECTION, SOLUTION SUBCUTANEOUS at 08:00

## 2022-09-04 RX ADMIN — FOLIC ACID 1 MG: 1 TABLET ORAL at 08:00

## 2022-09-04 RX ADMIN — INSULIN LISPRO 5 UNITS: 100 INJECTION, SOLUTION INTRAVENOUS; SUBCUTANEOUS at 07:59

## 2022-09-04 RX ADMIN — Medication 12.5 MG: at 08:00

## 2022-09-04 RX ADMIN — POTASSIUM CHLORIDE 40 MEQ: 1500 TABLET, EXTENDED RELEASE ORAL at 08:00

## 2022-09-04 RX ADMIN — PANTOPRAZOLE SODIUM 40 MG: 40 TABLET, DELAYED RELEASE ORAL at 06:01

## 2022-09-04 RX ADMIN — ENOXAPARIN SODIUM 40 MG: 40 INJECTION SUBCUTANEOUS at 08:00

## 2022-09-04 RX ADMIN — INSULIN LISPRO 5 UNITS: 100 INJECTION, SOLUTION INTRAVENOUS; SUBCUTANEOUS at 12:02

## 2022-09-04 RX ADMIN — Medication 1 TABLET: at 08:00

## 2022-09-04 RX ADMIN — THIAMINE HCL TAB 100 MG 100 MG: 100 TAB at 08:00

## 2022-09-04 RX ADMIN — INSULIN LISPRO 4 UNITS: 100 INJECTION, SOLUTION INTRAVENOUS; SUBCUTANEOUS at 12:01

## 2022-09-04 NOTE — ASSESSMENT & PLAN NOTE
· Patient has a history of alcohol abuse  · Notes he drinks every day, all day  Unable to quantify further  · Pre-contemplative on cessation  · Alcohol level undetectable on admission  · Patient was placed on CIWA scale, thiamine, folic acid  · Discussed with patient, and his family members the recommendations for complete cessation  His family members supportive this recommendation    Patient does not yet commit to abstinence or changing his intake

## 2022-09-04 NOTE — PLAN OF CARE
Problem: Potential for Falls  Goal: Patient will remain free of falls  Description: INTERVENTIONS:  - Educate patient/family on patient safety including physical limitations  - Instruct patient to call for assistance with activity   - Consult OT/PT to assist with strengthening/mobility   - Keep Call bell within reach  - Keep bed low and locked with side rails adjusted as appropriate  - Keep care items and personal belongings within reach  - Initiate and maintain comfort rounds  - Make Fall Risk Sign visible to staff  - Offer Toileting every 2 Hours, in advance of need  - Initiate/Maintain  alarm  - Obtain necessary fall risk management equipment:   - Apply yellow socks and bracelet for high fall risk patients  - Consider moving patient to room near nurses station  Outcome: Progressing     Problem: MOBILITY - ADULT  Goal: Maintain or return to baseline ADL function  Description: INTERVENTIONS:  -  Assess patient's ability to carry out ADLs; assess patient's baseline for ADL function and identify physical deficits which impact ability to perform ADLs (bathing, care of mouth/teeth, toileting, grooming, dressing, etc )  - Assess/evaluate cause of self-care deficits   - Assess range of motion  - Assess patient's mobility; develop plan if impaired  - Assess patient's need for assistive devices and provide as appropriate  - Encourage maximum independence but intervene and supervise when necessary  - Involve family in performance of ADLs  - Assess for home care needs following discharge   - Consider OT consult to assist with ADL evaluation and planning for discharge  - Provide patient education as appropriate  Outcome: Progressing  Goal: Maintains/Returns to pre admission functional level  Description: INTERVENTIONS:  - Perform BMAT or MOVE assessment daily    - Set and communicate daily mobility goal to care team and patient/family/caregiver     - Collaborate with rehabilitation services on mobility goals if consulted  - Perform Range of Motion 4 times a day  - Reposition patient every 2 hours    - Dangle patient 3 times a day  - Stand patient 3 times a day  - Ambulate patient 3 times a day  - Out of bed to chair 3 times a day   - Out of bed for meals 3 times a day  - Out of bed for toileting  - Record patient progress and toleration of activity level   Outcome: Progressing     Problem: SUBSTANCE USE/ABUSE  Goal: By discharge, patient will have ongoing treatment plan addressing chemical dependency  Description: INTERVENTIONS:  - Assist patient with resources and/or appointments for ongoing recovery based living  Outcome: Progressing     Problem: PAIN - ADULT  Goal: Verbalizes/displays adequate comfort level or baseline comfort level  Description: Interventions:  - Encourage patient to monitor pain and request assistance  - Assess pain using appropriate pain scale  - Administer analgesics based on type and severity of pain and evaluate response  - Implement non-pharmacological measures as appropriate and evaluate response  - Consider cultural and social influences on pain and pain management  - Notify physician/advanced practitioner if interventions unsuccessful or patient reports new pain  Outcome: Progressing     Problem: SAFETY ADULT  Goal: Patient will remain free of falls  Description: INTERVENTIONS:  - Educate patient/family on patient safety including physical limitations  - Instruct patient to call for assistance with activity   - Consult OT/PT to assist with strengthening/mobility   - Keep Call bell within reach  - Keep bed low and locked with side rails adjusted as appropriate  - Keep care items and personal belongings within reach  - Initiate and maintain comfort rounds  - Make Fall Risk Sign visible to staff  - Offer Toileting every  Hours, in advance of need  - Initiate/Maintain alarm  - Obtain necessary fall risk management equipment:   - Apply yellow socks and bracelet for high fall risk patients  - Consider moving patient to room near nurses station  Outcome: Progressing  Goal: Maintain or return to baseline ADL function  Description: INTERVENTIONS:  -  Assess patient's ability to carry out ADLs; assess patient's baseline for ADL function and identify physical deficits which impact ability to perform ADLs (bathing, care of mouth/teeth, toileting, grooming, dressing, etc )  - Assess/evaluate cause of self-care deficits   - Assess range of motion  - Assess patient's mobility; develop plan if impaired  - Assess patient's need for assistive devices and provide as appropriate  - Encourage maximum independence but intervene and supervise when necessary  - Involve family in performance of ADLs  - Assess for home care needs following discharge   - Consider OT consult to assist with ADL evaluation and planning for discharge  - Provide patient education as appropriate  Outcome: Progressing  Goal: Maintains/Returns to pre admission functional level  Description: INTERVENTIONS:  - Perform BMAT or MOVE assessment daily    - Set and communicate daily mobility goal to care team and patient/family/caregiver  - Collaborate with rehabilitation services on mobility goals if consulted  - Perform Range of Motion 4 times a day  - Reposition patient every 2 hours    - Dangle patient 3 times a day  - Stand patient 3 times a day  - Ambulate patient 3 times a day  - Out of bed to chair 3 times a day   - Out of bed for meals 3 times a day  - Out of bed for toileting  - Record patient progress and toleration of activity level   Outcome: Progressing     Problem: DISCHARGE PLANNING  Goal: Discharge to home or other facility with appropriate resources  Description: INTERVENTIONS:  - Identify barriers to discharge w/patient and caregiver  - Arrange for needed discharge resources and transportation as appropriate  - Identify discharge learning needs (meds, wound care, etc )  - Arrange for interpretive services to assist at discharge as needed  - Refer to Case Management Department for coordinating discharge planning if the patient needs post-hospital services based on physician/advanced practitioner order or complex needs related to functional status, cognitive ability, or social support system  Outcome: Progressing     Problem: Knowledge Deficit  Goal: Patient/family/caregiver demonstrates understanding of disease process, treatment plan, medications, and discharge instructions  Description: Complete learning assessment and assess knowledge base    Interventions:  - Provide teaching at level of understanding  - Provide teaching via preferred learning methods  Outcome: Progressing

## 2022-09-04 NOTE — PLAN OF CARE
Problem: Potential for Falls  Goal: Patient will remain free of falls  Description: INTERVENTIONS:  - Educate patient/family on patient safety including physical limitations  - Instruct patient to call for assistance with activity   - Consult OT/PT to assist with strengthening/mobility   - Keep Call bell within reach  - Keep bed low and locked with side rails adjusted as appropriate  - Keep care items and personal belongings within reach  - Initiate and maintain comfort rounds  - Make Fall Risk Sign visible to staff  - Offer Toileting every 2 Hours, in advance of need  - Initiate/Maintain alarm  - Obtain necessary fall risk management equipment  - Apply yellow socks and bracelet for high fall risk patients  - Consider moving patient to room near nurses station  Outcome: Adequate for Discharge     Problem: MOBILITY - ADULT  Goal: Maintain or return to baseline ADL function  Description: INTERVENTIONS:  -  Assess patient's ability to carry out ADLs; assess patient's baseline for ADL function and identify physical deficits which impact ability to perform ADLs (bathing, care of mouth/teeth, toileting, grooming, dressing, etc )  - Assess/evaluate cause of self-care deficits   - Assess range of motion  - Assess patient's mobility; develop plan if impaired  - Assess patient's need for assistive devices and provide as appropriate  - Encourage maximum independence but intervene and supervise when necessary  - Involve family in performance of ADLs  - Assess for home care needs following discharge   - Consider OT consult to assist with ADL evaluation and planning for discharge  - Provide patient education as appropriate  Outcome: Adequate for Discharge  Goal: Maintains/Returns to pre admission functional level  Description: INTERVENTIONS:  - Perform BMAT or MOVE assessment daily    - Set and communicate daily mobility goal to care team and patient/family/caregiver     - Collaborate with rehabilitation services on mobility goals if consulted  - Perform Range of Motion 3 times a day  - Reposition patient every 2 hours    - Dangle patient 3 times a day  - Stand patient 3 times a day  - Ambulate patient 3 times a day  - Out of bed to chair 3 times a day   - Out of bed for meals 3 times a day  - Out of bed for toileting  - Record patient progress and toleration of activity level   Outcome: Adequate for Discharge     Problem: SUBSTANCE USE/ABUSE  Goal: By discharge, patient will have ongoing treatment plan addressing chemical dependency  Description: INTERVENTIONS:  - Assist patient with resources and/or appointments for ongoing recovery based living  Outcome: Adequate for Discharge     Problem: PAIN - ADULT  Goal: Verbalizes/displays adequate comfort level or baseline comfort level  Description: Interventions:  - Encourage patient to monitor pain and request assistance  - Assess pain using appropriate pain scale  - Administer analgesics based on type and severity of pain and evaluate response  - Implement non-pharmacological measures as appropriate and evaluate response  - Consider cultural and social influences on pain and pain management  - Notify physician/advanced practitioner if interventions unsuccessful or patient reports new pain  Outcome: Adequate for Discharge     Problem: SAFETY ADULT  Goal: Patient will remain free of falls  Description: INTERVENTIONS:  - Educate patient/family on patient safety including physical limitations  - Instruct patient to call for assistance with activity   - Consult OT/PT to assist with strengthening/mobility   - Keep Call bell within reach  - Keep bed low and locked with side rails adjusted as appropriate  - Keep care items and personal belongings within reach  - Initiate and maintain comfort rounds  - Make Fall Risk Sign visible to staff  - Offer Toileting every 2 Hours, in advance of need  - Initiate/Maintain alarm  - Obtain necessary fall risk management equipment  - Apply yellow socks and bracelet for high fall risk patients  - Consider moving patient to room near nurses station  Outcome: Adequate for Discharge  Goal: Maintain or return to baseline ADL function  Description: INTERVENTIONS:  -  Assess patient's ability to carry out ADLs; assess patient's baseline for ADL function and identify physical deficits which impact ability to perform ADLs (bathing, care of mouth/teeth, toileting, grooming, dressing, etc )  - Assess/evaluate cause of self-care deficits   - Assess range of motion  - Assess patient's mobility; develop plan if impaired  - Assess patient's need for assistive devices and provide as appropriate  - Encourage maximum independence but intervene and supervise when necessary  - Involve family in performance of ADLs  - Assess for home care needs following discharge   - Consider OT consult to assist with ADL evaluation and planning for discharge  - Provide patient education as appropriate  Outcome: Adequate for Discharge  Goal: Maintains/Returns to pre admission functional level  Description: INTERVENTIONS:  - Perform BMAT or MOVE assessment daily    - Set and communicate daily mobility goal to care team and patient/family/caregiver  - Collaborate with rehabilitation services on mobility goals if consulted  - Perform Range of Motion 3 times a day  - Reposition patient every 2 hours    - Dangle patient 3 times a day  - Stand patient 3 times a day  - Ambulate patient 3 times a day  - Out of bed to chair 3 times a day   - Out of bed for meals 3 times a day  - Out of bed for toileting  - Record patient progress and toleration of activity level   Outcome: Adequate for Discharge     Problem: DISCHARGE PLANNING  Goal: Discharge to home or other facility with appropriate resources  Description: INTERVENTIONS:  - Identify barriers to discharge w/patient and caregiver  - Arrange for needed discharge resources and transportation as appropriate  - Identify discharge learning needs (meds, wound care, etc )  - Arrange for interpretive services to assist at discharge as needed  - Refer to Case Management Department for coordinating discharge planning if the patient needs post-hospital services based on physician/advanced practitioner order or complex needs related to functional status, cognitive ability, or social support system  Outcome: Adequate for Discharge     Problem: Knowledge Deficit  Goal: Patient/family/caregiver demonstrates understanding of disease process, treatment plan, medications, and discharge instructions  Description: Complete learning assessment and assess knowledge base    Interventions:  - Provide teaching at level of understanding  - Provide teaching via preferred learning methods  Outcome: Adequate for Discharge Detail Level: Zone Patient Reported Weight (Optional - Include Units): 68kg

## 2022-09-04 NOTE — DISCHARGE SUMMARY
[address] [facility]  Discharge- [address] [facility] 1970, 46 y o  male MRN: [id number]  Unit/Bed#: [address] 87 214-01 Encounter: [id number]  Primary Care Provider: ARIANE Chan   Date and time admitted to hospital: 9/1/2022  9:21 AM          Admission Date: 9/1/2022       Discharge Date:  09/04/2022        Primary Diagnoses  Principal Problem (Resolved):    Weakness  Active Problems:    Type 2 diabetes mellitus with stage 3 chronic kidney disease, with long-term current use of insulin (HCC)    Essential hypertension    Uncomplicated alcohol dependence (HCC)    Paroxysmal A-fib ([facility] )    CKD (chronic kidney disease), stage III ([facility] )    Hypokalemia    Cocaine use      Hospital Course by problem:  Connie Villanueva as of 9/4/2022  Assessment & Plan  Patient is a 51-year-old male with past medical history significant for insulin-dependent diabetes, paroxysmal atrial fibrillation, and alcohol abuse who presented to the ER for evaluation of generalized weakness    · Patient reported generalized weakness, fatigue, without any focal symptoms  Poor po intake x 24h without n/v/d or abd pain  · Was admitted to the hospital to undergo further evaluation[de-identified]  Noted that his generalized weakness was secondary to hypoglycemia, alcohol abuse, and cocaine use  · A) metabolic workup:   · undetectable alcohol level, despite patient relating a history of daily drinking  · B12/folate without deficit  · TSH wnl  · Normal cortisol  · Patient was noted to have hypoglycemia with a BS after admission of 39:    · Pt notes he has not checked his BS in over 5d  Has been taking his insulin, but not the past 2 days  · Patient was recently admitted 7/22 with generalized weakness secondary to hypoglycemia and at discharge he was instructed to decrease his home Lantus from 15 units down to 10  Patient initially stated he was taking Lantus 15 units once a day and now states he had been taking Lantus 26 units once a day  He notes he checks his blood sugars only once in a while  · Patient's regimen was titrated as discussed further below: Will be discharged on Lantus 10 units daily    B) infectious workup:   -patient with mild leukocytosis of 11:    -blood cx neg thus far   - CXR negative   - urinalysis negative for UTI  c) no focal neurologic deficits on exam   -negative head CT  D) toxic metabolic weakness  -patient with history of alcohol abuse:  Level undetectable on admission:  Counseled for cessation  -positive cocaine use on counseled for cessation  E) cardiac:  No evidence of ischemia on EKG or enzymes    Type 2 diabetes mellitus with stage 3 chronic kidney disease, with long-term current use of insulin [facility])  Assessment & Plan  Lab Results   Component Value Date    HGBA1C 6 7 (H) 07/30/2022       Recent Labs     09/04/22  0026 09/04/22  0739 09/04/22  1119 09/04/22  1429   POCGLU 186* 448* 342* 208*       Blood Sugar Average: Last 72 hrs:  (P) 235 [id number]     · Patient has diabetes type 2, with long-term use of insulin, with associated chronic kidney disease stage 3  · Patient has a history of variable compliance  · Prior admissions for hypoglycemia 7/22, and on his discharge he was instructed to decrease his Lantus from 15 units down to 10 units once daily  On admission patient states he was still taking 15 units once daily, and later he stated he was taking 26 units once daily  Patient notes he checks his blood sugars only once in a while  · Pt was noted to have hypoglycemia after admission with a blood sugar of 39  · Patient was tolerating p o  without any miss meals but had a subsequent blood sugar of 61 on the morning of 9/2  · Cortisol level normal  · Patient was monitored off all insulin, to evaluate if he had persistent hypoglycemia and would be workup for insulinoma ex cetera  · Patient had subsequent hyperglycemia    His insulin regimen was restarted and titrated  · Had family discussion with patient, his brother, and father:  Recommend patient be on treatment with Lantus once a day, and Humalog 3 times a day before meals  Patient states he will not do this, however will use Lantus once a day  · Patient be discharged on Lantus 10 units once daily in the morning  · Discussed with him the importance of eating 3 meals a day plus a bedtime snack  Patient's family notes that he frequently does not eat throughout the day  Patient states he does not get hungry  He was educated regarding the risk of hypoglycemia if he skipped any meals  · Also discussed with patient the recommendation for Humalog 3 times a day before meals which she is declining  · Discussed with patient the importance of checking his blood sugars 3 times a day  He declines  · Prescription sent for testing supplies to his pharmacy    Cocaine use  Assessment & Plan  · Urine drug screen was positive for cocaine use  · Patient was counseled for cessation    Hypokalemia  Assessment & Plan  · Patient with hypokalemia  · Repleted and improved  · Repleted magnesium    CKD (chronic kidney disease), stage III [facility])  Assessment & Plan  Lab Results   Component Value Date    EGFR 77 09/03/2022    EGFR 82 09/02/2022    EGFR 65 09/01/2022    CREATININE 1 09 09/03/2022    CREATININE 1 04 09/02/2022    CREATININE 1 26 09/01/2022     · Creatinine currently at baseline of 1 0-1 2    Paroxysmal A-fib ([facility] )  Assessment & Plan  · Patient has paroxysmal atrial fibrillation  · Is prescribed metoprolol 12 5 mg b i d   · Noted to have medication and follow-up noncompliance and is not currently prescribed anticoagulation, with h/o alcohol abuse    Uncomplicated alcohol dependence ([facility] )  Assessment & Plan  · Patient has a history of alcohol abuse  · Notes he drinks every day, all day    Unable to quantify further  · Pre-contemplative on cessation  · Alcohol level undetectable on admission  · Patient was placed on CIWA scale, thiamine, folic acid  · Discussed with patient, and his family members the recommendations for complete cessation  His family members supportive this recommendation  Patient does not yet commit to abstinence or changing his intake    Essential hypertension  Assessment & Plan  · Patient has essential hypertension  · Will be discharged to continue his home medications with lisinopril/HCTZ 20-12 5 mg daily, and metoprolol 12 5 mg q 12 hours  ·         Service:  [facility]  Consulting Providers   None    Procedures Performed   None    Hospital Studies:  9/1:  Chest x-ray:  No acute cardiopulmonary disease     9/1:  CT head:  No acute intracranial abnormality     Microbiology  9/1 blood culture:  Pending x2  9/1:  Negative COVID  9/1:  Negative COVID, influenza, RSV    Results from last 7 days   Lab Units 09/02/22  0523 09/01/22  1031   WBC Thousand/uL 11 23* 11 34*   HEMOGLOBIN g/dL 13 6 15 2   HEMATOCRIT % 37 1 41 1   PLATELETS Thousands/uL 182 182     Results from last 7 days   Lab Units 09/03/22  0511 09/02/22  0523 09/01/22  1031   POTASSIUM mmol/L 3 7 3 1* 3 1*   CHLORIDE mmol/L 104 105 102   CO2 mmol/L 23 26 29   BUN mg/dL 13 12 11   CREATININE mg/dL 1 09 1 04 1 26   CALCIUM mg/dL 8 2* 8 5 9 2       History and Physical Exam:  Please refer to the Admission H&P note    Discharge Condition: Improved  Discharge Disposition: Home/Self Care    Discharge Note and Physical Exam:   Patient is examined and interviewed by me in Brazilian at the bedside  Patient's father, and brother, or present during the interview, after receiving patient's permission  Patient denies any complaints  Denies any pain  Denies any shortness of breath  Notes he is tolerating p o  Patient now states he was taking Lantus 26 units once a day at home  He states he checks his blood sugars only once in a while  Patient's brother notes patient has poor p o  intake at home and frequently does not eat  Patient states is because he is not hungry  Patient states he will inject himself only once a day  He declines Humalog insulin or inject himself more than once a day  Patient notes that he has a working glucometer at home  He states he has testing supplies, lancets, and strips  His brother request a refill be called to his pharmacy just in case    Vitals:    09/04/22 1532   BP: 145/74   Pulse: 65   Resp: 17   Temp:    SpO2: 98%     General:  Pleasant, non-tachypnic, non-dyspnic  Conversant  Not tachypneic  Heart: Regular rate and rhythm, S1S2 present  No murmur, rub or gallop  Lungs: Clear to auscultation bilaterally, no wheezing, rhonchi, or crackles  Good air movement  No accessory muscle use or respiratory distress  Abdomen: soft, non-tender, non-distended, NABS  No rebound or guarding  No mass or peritoneal signs  Neurologic:  Awake and alert  Fluent speech  Interactive  Moving all 4 extremities  Skin: warm and dry  No petechiae, purpura or rash  Discharge Medications   Please see Medical Reconciliation Discharge Form    Discharge Follow Up Appointments:   ARIANE Carrasquillo: 1 week    Discharge  Statement   Total Time Spent today including physical exam, discussion with patient and discharge arrangements/care = 33 minutes  dw pts nurse  dw       Family:  Updated patient, his father and brother, in [facility] at the bedside  Review test results and treatment plan  Reviewed patient's generalized weakness was secondary to low blood sugar  Instructed patient to eat 3 meals a day plus a bedtime snack  Instructed patient to check his blood sugars at least 3 times a day  Recommended he use 2 types of insulin, Lantus once a day, and pre meal Humalog  He declines however notes he will inject Lantus once a day  Specifically stressed to patient, and his brother reinforce this 2 separate occasions that he has to take only 10 units of Lantus once a day, and to check his blood sugar 3 times a day    Stressed to patient that he cannot skip any meals because the Lantus last 24 hours and this would cause low blood sugars 2nd be dangerous  Stressed to patient that he needs to stop drinking as it can cause liver damage      This note has been constructed using a voice recognition system

## 2022-09-04 NOTE — NURSING NOTE
Discharge instructions reviewed with patient at bedside via [address] [facility] speaking   Patient, brother and father verbalize understanding of all discharge instructions  Discharge instructions handed to patient at time of discharge  IV removed, mally disconnected  VSS  All questions addressed, no further questions or concerns  All belongings sent with patient  Patient discharged to home

## 2022-09-04 NOTE — PLAN OF CARE
Problem: Potential for Falls  Goal: Patient will remain free of falls  Description: INTERVENTIONS:  - Educate patient/family on patient safety including physical limitations  - Instruct patient to call for assistance with activity   - Consult OT/PT to assist with strengthening/mobility   - Keep Call bell within reach  - Keep bed low and locked with side rails adjusted as appropriate  - Keep care items and personal belongings within reach  - Initiate and maintain comfort rounds  - Make Fall Risk Sign visible to staff  - Offer Toileting every  2 Hours, in advance of need  - Apply yellow socks and bracelet for high fall risk patients  - Consider moving patient to room near nurses station  Outcome: Progressing     Problem: MOBILITY - ADULT  Goal: Maintain or return to baseline ADL function  Description: INTERVENTIONS:  -  Assess patient's ability to carry out ADLs; assess patient's baseline for ADL function and identify physical deficits which impact ability to perform ADLs (bathing, care of mouth/teeth, toileting, grooming, dressing, etc )  - Assess/evaluate cause of self-care deficits   - Assess range of motion  - Assess patient's mobility; develop plan if impaired  - Assess patient's need for assistive devices and provide as appropriate  - Encourage maximum independence but intervene and supervise when necessary  - Involve family in performance of ADLs  - Assess for home care needs following discharge   - Consider OT consult to assist with ADL evaluation and planning for discharge  - Provide patient education as appropriate  Outcome: Progressing  Goal: Maintains/Returns to pre admission functional level  Description: INTERVENTIONS:  - Perform BMAT or MOVE assessment daily    - Set and communicate daily mobility goal to care team and patient/family/caregiver     - Collaborate with rehabilitation services on mobility goals if consulted  - Out of bed for toileting  - Record patient progress and toleration of activity level   Outcome: Progressing     Problem: SUBSTANCE USE/ABUSE  Goal: By discharge, patient will have ongoing treatment plan addressing chemical dependency  Description: INTERVENTIONS:  - Assist patient with resources and/or appointments for ongoing recovery based living  Outcome: Progressing     Problem: PAIN - ADULT  Goal: Verbalizes/displays adequate comfort level or baseline comfort level  Description: Interventions:  - Encourage patient to monitor pain and request assistance  - Assess pain using appropriate pain scale  - Administer analgesics based on type and severity of pain and evaluate response  - Implement non-pharmacological measures as appropriate and evaluate response  - Consider cultural and social influences on pain and pain management  - Notify physician/advanced practitioner if interventions unsuccessful or patient reports new pain  Outcome: Progressing     Problem: SAFETY ADULT  Goal: Patient will remain free of falls  Description: INTERVENTIONS:  - Educate patient/family on patient safety including physical limitations  - Instruct patient to call for assistance with activity   - Consult OT/PT to assist with strengthening/mobility   - Keep Call bell within reach  - Keep bed low and locked with side rails adjusted as appropriate  - Keep care items and personal belongings within reach  - Initiate and maintain comfort rounds  - Make Fall Risk Sign visible to staff  - Apply yellow socks and bracelet for high fall risk patients  - Consider moving patient to room near nurses station  Outcome: Progressing  Goal: Maintain or return to baseline ADL function  Description: INTERVENTIONS:  -  Assess patient's ability to carry out ADLs; assess patient's baseline for ADL function and identify physical deficits which impact ability to perform ADLs (bathing, care of mouth/teeth, toileting, grooming, dressing, etc )  - Assess/evaluate cause of self-care deficits   - Assess range of motion  - Assess patient's mobility; develop plan if impaired  - Assess patient's need for assistive devices and provide as appropriate  - Encourage maximum independence but intervene and supervise when necessary  - Involve family in performance of ADLs  - Assess for home care needs following discharge   - Consider OT consult to assist with ADL evaluation and planning for discharge  - Provide patient education as appropriate  Outcome: Progressing  Goal: Maintains/Returns to pre admission functional level  Description: INTERVENTIONS:  - Perform BMAT or MOVE assessment daily    - Set and communicate daily mobility goal to care team and patient/family/caregiver  - Collaborate with rehabilitation services on mobility goals if consulted  - Out of bed for toileting  - Record patient progress and toleration of activity level   Outcome: Progressing     Problem: DISCHARGE PLANNING  Goal: Discharge to home or other facility with appropriate resources  Description: INTERVENTIONS:  - Identify barriers to discharge w/patient and caregiver  - Arrange for needed discharge resources and transportation as appropriate  - Identify discharge learning needs (meds, wound care, etc )  - Arrange for interpretive services to assist at discharge as needed  - Refer to Case Management Department for coordinating discharge planning if the patient needs post-hospital services based on physician/advanced practitioner order or complex needs related to functional status, cognitive ability, or social support system  Outcome: Progressing     Problem: Knowledge Deficit  Goal: Patient/family/caregiver demonstrates understanding of disease process, treatment plan, medications, and discharge instructions  Description: Complete learning assessment and assess knowledge base    Interventions:  - Provide teaching at level of understanding  - Provide teaching via preferred learning methods  Outcome: Progressing

## 2022-09-04 NOTE — ASSESSMENT & PLAN NOTE
Lab Results   Component Value Date    HGBA1C 6 7 (H) 07/30/2022       Recent Labs     09/04/22  0026 09/04/22  0739 09/04/22  1119 09/04/22  1429   POCGLU 186* 448* 342* 208*       Blood Sugar Average: Last 72 hrs:  (P) 235 [id number]     · Patient has diabetes type 2, with long-term use of insulin, with associated chronic kidney disease stage 3  · Patient has a history of variable compliance  · Prior admissions for hypoglycemia 7/22, and on his discharge he was instructed to decrease his Lantus from 15 units down to 10 units once daily  On admission patient states he was still taking 15 units once daily, and later he stated he was taking 26 units once daily  Patient notes he checks his blood sugars only once in a while  · Pt was noted to have hypoglycemia after admission with a blood sugar of 39  · Patient was tolerating p o  without any miss meals but had a subsequent blood sugar of 61 on the morning of 9/2  · Cortisol level normal  · Patient was monitored off all insulin, to evaluate if he had persistent hypoglycemia and would be workup for insulinoma ex cetera  · Patient had subsequent hyperglycemia  His insulin regimen was restarted and titrated  · Had family discussion with patient, his brother, and father:  Recommend patient be on treatment with Lantus once a day, and Humalog 3 times a day before meals  Patient states he will not do this, however will use Lantus once a day  · Patient be discharged on Lantus 10 units once daily in the morning  · Discussed with him the importance of eating 3 meals a day plus a bedtime snack  Patient's family notes that he frequently does not eat throughout the day  Patient states he does not get hungry  He was educated regarding the risk of hypoglycemia if he skipped any meals    · Also discussed with patient the recommendation for Humalog 3 times a day before meals which she is declining  · Discussed with patient the importance of checking his blood sugars 3 times a day   He declines  · Prescription sent for testing supplies to his pharmacy

## 2022-09-04 NOTE — ASSESSMENT & PLAN NOTE
· Patient has essential hypertension  · Will be discharged to continue his home medications with lisinopril/HCTZ 20-12 5 mg daily, and metoprolol 12 5 mg q 12 hours  ·

## 2022-09-04 NOTE — ASSESSMENT & PLAN NOTE
Patient is a 51-year-old male with past medical history significant for insulin-dependent diabetes, paroxysmal atrial fibrillation, and alcohol abuse who presented to the ER for evaluation of generalized weakness    · Patient reported generalized weakness, fatigue, without any focal symptoms  Poor po intake x 24h without n/v/d or abd pain  · Was admitted to the hospital to undergo further evaluation[de-identified]  Noted that his generalized weakness was secondary to hypoglycemia, alcohol abuse, and cocaine use  · A) metabolic workup:   · undetectable alcohol level, despite patient relating a history of daily drinking  · B12/folate without deficit  · TSH wnl  · Normal cortisol  · Patient was noted to have hypoglycemia with a BS after admission of 39:    · Pt notes he has not checked his BS in over 5d  Has been taking his insulin, but not the past 2 days  · Patient was recently admitted 7/22 with generalized weakness secondary to hypoglycemia and at discharge he was instructed to decrease his home Lantus from 15 units down to 10  Patient initially stated he was taking Lantus 15 units once a day and now states he had been taking Lantus 26 units once a day  He notes he checks his blood sugars only once in a while  · Patient's regimen was titrated as discussed further below:   Will be discharged on Lantus 10 units daily    B) infectious workup:   -patient with mild leukocytosis of 11:    -blood cx neg thus far   - CXR negative   - urinalysis negative for UTI  c) no focal neurologic deficits on exam   -negative head CT  D) toxic metabolic weakness  -patient with history of alcohol abuse:  Level undetectable on admission:  Counseled for cessation  -positive cocaine use on counseled for cessation  E) cardiac:  No evidence of ischemia on EKG or enzymes

## 2022-09-04 NOTE — DISCHARGE INSTRUCTIONS
Insulina Glargina (Lantus, Lantus SoloStar, Toujeo, Semglee) - (Por inyección)   ECHO is off  Para qué se utiliza alva medicamento:   Se [place] para tratar la diabetes  Comuníquese de inmediato con un médico o enfermera si usted tiene:  Gabriel's, aumento de la sed, calambres musculares, náuseas, vómitos, latidos cardíacos irregulares  Aumento de peso rápido, [employer], tobillos o pies, dificultad para respirar, cansancio  Temblores, agitación, sudor, ritmo cardíaco acelerado o saltado, desmayo, hambre, confusión  Jarad, escalofríos, tos, flujo o congestión nasal, dolor de garganta, justice corporales     Efectos secundarios comunes:  Enrojecimiento, picazón, hinchazón o cambios en la piel donde se aplicó la inyección  Ronchas o picazón en la piel    © Copyright [facility] 2022 Information is for End User's use only and may not be sold, redistributed or otherwise used for commercial purposes   ==========================================================================  Hipoglucemia en brad persona con diabetes   LO QUE NECESITA SABER:   ¿Qué es la hipoglucemia? La hipoglucemia es brad afección grave que se produce cuando el nivel de glucosa (azúcar) en la [place] baja demasiado  El nivel de azúcar en la [place] generalmente es demasiado alto en brad persona con diabetes, ol el azúcar en la [place] también puede caer a un nivel excesivamente bajo  Es importante que usted siga roberts plan de manejo de la diabetes para mantener yasmine roberts nivel de azúcar en la [place]  ¿Qué aumenta mi riesgo de tener hipoglucemia?   Atracones, comer grandes cantidades de carbohidratos en alimentos procesados, sheyla las patatas fritas    Omitir brad comida o comer brad comida más tarde de lo habitual    Vómitos    Ciertos medicamentos, incluyendo la insulina u otros medicamentos para la diabetes    Más ejercicio de lo usual sin alimento adicional    Uso de alcohol    Embarazo, edad avanzada    Disminución de la función del hígado o del riñón    No reconocer que philly síntomas son síntomas de hipoglucemia    ¿Cuáles son los signos y síntomas de la hipoglucemia? Dolor de [place], hambre o temblores    Dificultad para pensar o altibajos del estado de ánimo    Sudoración o latidos cardíacos paris    Dificultad para recordar, confusión o visión doble    Debilidad o dificultad para caminar    Entumecimiento y hormigueo alrededor de la boca    Convulsiones, coma o pérdida del conocimiento    ¿Cómo manejo la hipoglucemia? Revise roberts nivel de azúcar en la [place] inmediatamente si usted tiene síntomas de hipoglucemia  La hipoglucemia suele producirse cuando el nivel de azúcar en [place] es de 70 mg/dL o inferior  Pregunte a roberts equipo de cuidado de la diabetes qué nivel de azúcar en la [place] es demasiado bajo para usted  Si roberts nivel de azúcar en la [place] está bajo, coma o tome 15 gramos de carbohidratos de acción rápida  Ejemplos de esta cantidad de carbohidratos de acción rápida son 4 onzas (1/2 taza) de jugo de fruta o 4 onzas de gaseosa regular  Otros ejemplos son 2 cucharadas de pasas de uva o 1 tubo de gel de glucosa  Revise roberts nivel de azúcar en la [place] 15 minutos más tarde  Siéntese quieto Faria Young  Si el nivel es todavía bajo (menos de 100 mg/dL), ingiera otros 15 gramos de carbohidratos  Cuando el nivel regresa a 100 mg/dl, coma brad comida si ya es hora  Si falta más de 1 hora para roberts hora de comida, coma un bocado  El bocado debe contener hidratos de SJÖMAHAL, sheyla los siguientes:    3/4 taza de cereal    1 taza de leche descremada o baja en grasa    6 galletas de agua saladas    1/2 sándwich de pavo    15 patatas fritas sin grasa  [place] ayudará a evitar otra caída de roberts nivel de azúcar en la [place]  Siempre siga cuidadosamente las instrucciones de roberts equipo de cuidado de la diabetes acerca de cómo tratar los niveles bajos de azúcar en la [place]    Siempre lleve consigo alguna kedar de carbohidratos de acción rápida  Si usted presenta síntomas de hipoglucemia y no tiene un glucómetro, igual lleve consigo brad kedar de carbohidratos de acción rápida  Evite carbohidratos de alimentos que son altos en grasa  El contenido de grasa podría hacer que el carbohidrato tarde más en subir roberts nivel de azúcar en la [place]  Pregunte a roberts equipo de cuidado de la diabetes si usted debería llevar consigo un estuche de glucagón  El glucagón es un medicamento que se inyecta cuando usted desarrolla hipoglucemia grave y pierde el conocimiento  Revise la fecha de vencimiento todos los meses y reemplace el medicamento de roberts vencimiento  Enseñe a otras personas cómo ayudarlo si usted tiene síntomas de hipoglucemia  Infórmeles acerca de los síntomas de la hipoglucemia  Pídales que le den brad kedar de carbohidratos de acción rápida si usted no puede autoadministrársela  Pídales que le apliquen brad inyección de glucagón si tiene signos de hipoglucemia y usted pierde el conocimiento o si tiene brad convulsión  Pídales que llamen al [employer] de emergencias (911 en los [place] del [facility])   [place] es brad emergencia  Indíqueles que nunca lo jose que trague nada si se desmaya o si tiene brad convulsión  Use accesorios de alerta médica o lleve consigo brad tarjeta que indique que usted tiene diabetes  Pregunte en dónde puede conseguir estos artículos  ¿Cómo puedo prevenir la hipoglucemia? [place] los medicamentos de diabetes sheyla se le indique  Use philly medicamentos a la hora y en la cantidad exacta  No duplique la cantidad de [employer] keenan a menos que roberts equipo de cuidado de la diabetes se lo indique  Es posible que necesite medicamento oral para la diabetes, insulina o ambos para controlar philly niveles de glucosa en [place]  Roberts médico le indicará cómo y cuándo debe jeannette medicamento oral para la diabetes  También se le enseñarán los efectos secundarios que pueden causar los medicamentos orales para la diabetes   Se puede añadir insulina si los medicamentos orales para la diabetes pierden eficacia con el tiempo  La insulina puede administrarse mediante inyección o brad bomba de insulina o brad pluma  Usted y roberts equipo de atención analizarán qué método es mejor para usted  La bomba de insulina es un dispositivo implantado que le da insulina las 24 horas del día  Brad bomba de insulina previene la necesidad de inyecciones múltiples de insulina en un día  La pluma para insulina es un dispositivo precargado con la cantidad [place] de insulina  Coma las comidas y los tentempiés según las indicaciones  Hable con roberts nutricionista o equipo de cuidado de la diabetes acerca de un plan de alimentación adecuado para usted  No se salte ninguna comida  Revise roberts nivel de azúcar en la [place] según indicaciones  Pregunte a roberts equipo de cuidado de la diabetes cuáles son los niveles de azúcar adecuados para usted antes y [address] comidas  Pregúntele cuándo y con qué frecuencia debe revisar roberts nivel de azúcar en la [place]  Es posible que tenga que comprobar brad gota jhon Carrizales en brad máquina de análisis de glucosa  Es posible que necesite controlarlos al menos 3 veces al día  Registre el resultado de roberts nivel de azúcar en la [place] y lleve roberts registro con usted cuando tenga brad jaime con roberts equipo de cuidado de la diabetes  Podrían usarlo para hacer cambios a roberts medicamento, alimentación o itinerario de ejercicios  Es posible que roberts médico del equipo de cuidados de [place] le recomiende un monitor continuo de glucosa (MCG)  Un monitor continuo de glucosa es un dispositivo que se lleva puesto en todo momento  El monitor continuo de glucosa revisa roberts nivel de azúcar en la [place] cada 5 minutos  [employer] a un dispositivo electrónico, sheyla un teléfono inteligente  Revise roberts nivel de azúcar en la [place] antes de hacer ejercicio  La actividad física, sheyla el ejercicio, puede disminuir roberts nivel de azúcar en la [place]   Si roberts nivel de azúcar en la [place] es inferior a 100 mg /dL, consuma un bocadillo de carbohidratos  Riddhi Verona son 4 a 6 galletas saladas, ½ plátano, 8 onzas (1 taza) de [employer] o del 1%, o 4 onzas de jugo (½ taza)  Si va a mantenerse [employer] de 1 hora, revise flynn nivel de azúcar en la [place] cada 30 minutos  Lfynn equipo de cuidado de la diabetes también podría recomendarle que revise flynn nivel de azúcar en la [place] después de la [place]  Conozca los riesgos si decide beber alcohol  El alcohol puede causar que philly niveles de azúcar en la [place] estén bajos si [place] insulina  El alcohol puede causar [facility] de azúcar en la [place] y [place] de peso si neymar [facility]ina[facility] Hospital[facility] May[facility] de [address] y los hombres de 72 años o más deben limitar el consumo de alcohol a 1 bebida por día  Los hombres de [address] de alcohol a 2 tragos al día  Un trago equivale a 12 onzas de cerveza, 5 onzas de vino o 1 onza y ½ de licor  Pídale a alguien que llame al [facility] de emergencias local (911 en los [place]) si:  Usted tiene brad convulsión o se desmaya  Flynn nivel de glucosa en la [place] es de menos de 50 mg/dL y no responde al tratamiento  Usted siente que se va a desmayar  Usted tiene dificultad para pensar con claridad  ¿Cuándo ish llamar al médico o al equipo de cuidado de la diabetes? Usted ha tenido síntomas de un descenso del nivel de azúcar varias veces  Usted tiene preguntas acerca de la cantidad de insulina o medicamento para la diabetes que está tomando  Usted tiene preguntas o inquietudes acerca de flynn condición o cuidado  ACUERDOS SOBRE FLYNN CUIDADO:   Usted tiene el derecho de ayudar a planear flynn cuidado  Aprenda todo lo que pueda sobre flynn condición y sheyla darle tratamiento  Discuta philly opciones de tratamiento con philly médicos para decidir el cuidado que usted desea recibir  Usted siempre tiene el derecho de rechazar el tratamiento  Esta información es sólo para uso en educación  Garcia intención no es darle un consejo médico sobre enfermedades o tratamientos  Colsulte con garcia Refugio Garcia farmacéutico antes de seguir cualquier régimen médico para saber si es seguro y efectivo para usted  © Copyright [employer] 2022 Information is for End User's use only and may not be sold, redistributed or otherwise used for commercial purposes  All illustrations and images included in CareNotes® are the copyrighted property of MAYTE MO AMEE  or [address]  ===================================================================================    Instrucciones    Usa solamente 10 unidades de insulina cada [place]  Solamente JON!!!  No 26!! Necessita [place] garcia azucar jalen veces cada sarah    Necessita comer jalen veces cada sarah y otra ves por la noche antes de dormir  ============================================================

## 2022-09-06 LAB
BACTERIA BLD CULT: NORMAL
BACTERIA BLD CULT: NORMAL

## 2022-09-07 ENCOUNTER — TELEPHONE (OUTPATIENT)
Dept: FAMILY MEDICINE CLINIC | Facility: CLINIC | Age: 52
End: 2022-09-07

## 2022-09-07 NOTE — TELEPHONE ENCOUNTER
09/07/22    first and final attempt to contact patient  Called pt and spoke to pt wife  Pt wife stated that mr bourne will contact the office to schedule appt  Mr Henrietta Bruner was not at home and his wife stated that she will relate the message  If pt contacts office, please assist pt with scheduling an appt to continue care with our office  Per Miss Hilda Wall message, pt has never seen carlin, so pcp will be removed per her request        Note: letter to sarah appt was sent

## 2022-09-07 NOTE — UTILIZATION REVIEW
Inpatient Admission Authorization Request   NOTIFICATION OF INPATIENT ADMISSION/INPATIENT AUTHORIZATION REQUEST   SERVICING FACILITY:   [address] [place] [address], [facility], [facility]  Tax ID: [id number]  NPI: [id number]  Place of Service: Inpatient [facility]  [address]  Place of Service Code: 24     ATTENDING PROVIDER:  Attending Name and NPI#: AngelesModesto Chamorro [[id number]]  Address: [address] [place] [address], [facility], [facility]  Phone: [phone]     UTILIZATION REVIEW CONTACT:  Ofelia Gutierrez, Utilization   Network Utilization Review Department  Phone: [phone]  Fax: [phone]  Email: [email or web address]     PHYSICIAN ADVISORY SERVICES:  FOR [id number] REVIEW - MEDICAL NECESSITY DENIAL  Phone: [phone]  Fax: [phone]  Email: [email or web address]     TYPE OF REQUEST:  Inpatient Status     ADMISSION INFORMATION:  ADMISSION DATE/TIME: 9/1/22  2:15 PM  PATIENT DIAGNOSIS CODE/DESCRIPTION:  Back pain [M54 9]  Weakness [R53 1]  EKG abnormalities [R94 31]  Generalized weakness [R53 1]  DISCHARGE DATE/TIME: 9/4/2022  5:12 PM   IMPORTANT INFORMATION:  Please contact Ofelia Gutierrez directly with any questions or concerns regarding this request  Department voicemails are confidential     Send requests for admission clinical reviews, concurrent reviews, approvals, and administrative denials due to lack of clinical to fax [phone]              Anupama Jamison RN   Registered Nurse   Specialty:  Utilization Review   Utilization Review       Signed   Date of Service:  9/2/2022 11:14 AM                 Signed        Expand All Collapse All        Show:Clear all  [x]Manual[x]Template[x]Copied    Added by:  [x]Yaneth Gilbert RN      []Lukas for details    Initial Clinical Review     Admission: Date/Time/Statement:       Admission Orders (From admission, onward)              Ordered          09/01/22 1414   INPATIENT ADMISSION  Once   09/01/22 1231   Place in Observation  Once,   Status:  Canceled                                Orders Placed This Encounter   Procedures    INPATIENT ADMISSION       Standing Status:   Standing       Number of Occurrences:   1       Order Specific Question:   Level of Care       Answer:   Med Surg [16]       Order Specific Question:   Estimated length of stay       Answer:   More than 2 Midnights       Order Specific Question:   Certification       Answer:   I certify that inpatient services are medically necessary for this patient for a duration of greater than two midnights  See H&P and MD Progress Notes for additional information about the patient's course of treatment                ED Arrival Information               Expected   -    Arrival   9/1/2022 09:10    Acuity   Urgent              Means of arrival   Walk-In    Escorted by   Family Member    Service   Hospitalist    Admission type   Urgent              Arrival complaint   Back pain,Weakness                  Chief Complaint   Patient presents with    Medical Problem       Pt reports generalized weakness for the past 3 days  Denies CP/SOB/HA  Pt states to  " I am just a sick man I dont know, I dont listen well  Im weak"         Initial Presentation: 46 y o  male PMH alcohol abuse chronic pancreatitis DM type 2, paroxysmal Afib, thrombocytopenia  Patient c/o generalized weakness, fatigue, without any focal symptoms  Has not check BS in over 5d , has not taken insulin for 2 days  Infectious workup, mild leukocytosis of 11, pan cx, check ua     Alcohol drug abuse check urine drug screen  Admitted Inpatient DM type 2 BS after admission recheck was 39   Hypokalemia replete and monitor   afib non compliance with meds not on anticoagulaiton 2/2 h/o alcohol abuse  Alcohol dependence   CIWA drinks qd all day   HTN continue home meds lisinopril/HCTZ     Date: 9/2/22   Day 2:             ED Triage Vitals   Temperature Pulse Respirations Blood Pressure SpO2   09/01/22 0918 09/01/22 0918 09/01/22 0918 09/01/22 0918 09/01/22 0918   97 5 °F (36 4 °C) 89 16 125/86 100 %       Temp Source Heart Rate Source Patient Position - Orthostatic VS BP Location FiO2 (%)   09/01/22 0918 09/01/22 0918 09/01/22 0918 09/01/22 0918 --   Oral Monitor Sitting Right arm         Pain Score           09/01/22 1425           No Pain                  Wt Readings from Last 1 Encounters:   09/02/22 63 5 kg (139 lb 15 9 oz)      Additional Vital Signs:   09/02/22 07:25:19 97 6 °F (36 4 °C) 65 16 153/84 107 97 % -- --   09/02/22 05:17:32 -- 69 -- 152/86 108 98 % -- --   09/02/22 0000 -- 64 -- 152/86 -- -- -- --   09/01/22 20:42:53 98 5 °F (36 9 °C) 66 20 153/87 109 98 % None (Room air) Lying   09/01/22 2030 -- 65 -- 153/87 -- 98 % None (Room air) --   09/01/22 16:38:32 97 7 °F (36 5 °C) 70 16 188/91 Abnormal  123 98 % -- --   09/01/22 1536 -- 80 16 191/91 Abnormal  -- 98 % None (Room air) Lying   09/01/22 1425 -- 75 16 171/82 Abnormal  -- --             Pertinent Labs/Diagnostic Test Results:   EKG:  Normal sinus rhythm   T-wave inversion lead III, avF, V3-V5  TWI seen 1/20/22 in leads III, aVF, V4-5  XR chest 1 view portable   Final Result by Alessio Osei MD (09/01 1306)       No acute cardiopulmonary disease                        Workstation performed: [id number]           CT head without contrast   Final Result by Samantha Mittal MD (09/01 1044)       No acute intracranial abnormality                        Workstation performed: [id number]                        Results from last 7 days   Lab Units 09/02/22  0523 09/01/22  1031   WBC Thousand/uL 11 23* 11 34*   HEMOGLOBIN g/dL 13 6 15 2   HEMATOCRIT % 37 1 41 1   PLATELETS Thousands/uL 182 182   NEUTROS ABS Thousands/µL  --  7 47             Results from last 7 days   Lab Units 09/02/22  0523 09/01/22  1757 09/01/22  1031   SODIUM mmol/L 141  --  142   POTASSIUM mmol/L 3 1*  --  3 1*   CHLORIDE mmol/L 105  --  102 CO2 mmol/L 26  --  29   ANION GAP mmol/L 10  --  11   BUN mg/dL 12  --  11   CREATININE mg/dL 1 04  --  1 26   EGFR ml/min/1 73sq m 82  --  65   CALCIUM mg/dL 8 5  --  9 2   MAGNESIUM mg/dL 1 6 1 3*  --            Results from last 7 days   Lab Units 09/01/22  1031   AST U/L 14   ALT U/L 28   ALK PHOS U/L 91   TOTAL PROTEIN g/dL 7 6   ALBUMIN g/dL 3 7   TOTAL BILIRUBIN mg/dL 0 41                 Results from last 7 days   Lab Units 09/02/22  0827 09/02/22  0723 09/01/22  2041 09/01/22  1704 09/01/22  1626 09/01/22  1100 09/01/22  0930   POC GLUCOSE mg/dl 173* 61* 227* 227* 39* 92 128            Results from last 7 days   Lab Units 09/02/22  0523 09/01/22  1031   GLUCOSE RANDOM mg/dL 56* 99             Results from last 7 days   Lab Units 09/01/22  1425 09/01/22  1305 09/01/22  1031   HS TNI 0HR ng/L  --   --  5   HS TNI 2HR ng/L  --  6  --    HSTNI D2 ng/L  --  1  --    HS TNI 4HR ng/L 5  --   --    HSTNI D4 ng/L 0  --   --            Results from last 7 days   Lab Units 09/01/22  1031   PROTIME seconds 13 2   INR   1 00   PTT seconds 23            Results from last 7 days   Lab Units 09/01/22  1757 09/01/22  1031   TSH 3RD GENERATON uIU/mL 0 544 0 897           Results from last 7 days   Lab Units 09/01/22  1645   AMPH/METH   Negative   BARBITURATE UR   Negative   BENZODIAZEPINE UR   Negative   COCAINE UR   Positive*   METHADONE URINE   Negative   OPIATE UR   Negative   PCP UR   Negative   THC UR   Negative           Results from last 7 days   Lab Units 09/01/22  1738   ETHANOL LVL mg/dL <3           Results from last 7 days   Lab Units 09/01/22  1738   BLOOD CULTURE   Received in Microbiology Lab  Culture in Progress  Received in Microbiology Lab   Culture in Progress          ED Treatment:               Medication Administration from 09/01/2022 0910 to 09/01/2022 1601        Date/Time Order Dose Route Action Action by Comments       09/01/2022 1400 sodium chloride 0 9 % bolus 1,000 mL 0 mL Intravenous Stopped Alok Pantoja RN         09/01/2022 1214 sodium chloride 0 9 % bolus 1,000 mL 1,000 mL Intravenous New Bag Alok Pantoja RN         09/01/2022 1421 potassium chloride (K-DUR,KLOR-CON) CR tablet 40 mEq 40 mEq Oral Given Alok Pantoja RN         09/01/2022 1420 aspirin chewable tablet 324 mg 324 mg Oral Given Alok Pantoja RN            Medical History        Past Medical History:   Diagnosis Date    Alcohol abuse      [facility] )      [facility]       Type 2    Pancreatitis      Paroxysmal A-fib (HCC)      Thrombocytopenia (HCC)           Present on Admission:   CKD (chronic kidney disease), stage III (HCC)   Essential hypertension   Paroxysmal A-fib (HCC)   Uncomplicated alcohol dependence (HCC)   Hypokalemia        Admitting Diagnosis: Back pain [M54 9]  Weakness [R53 1]  EKG abnormalities [R94 31]  Generalized weakness [R53 1]  Age/Sex: 46 y o  male  Admission Orders:  gmf  CIWA  Cortisol  ua cs  Daily weight I/o     Scheduled Medications:  docusate sodium, 100 mg, Oral, BID  enoxaparin, 40 mg, Subcutaneous, N14O JAISON  folic acid, 1 mg, Oral, Daily  metoprolol tartrate, 12 5 mg, Oral, Q12H JAISON  multivitamin-minerals, 1 tablet, Oral, Daily  potassium chloride, 40 mEq, Oral, BID  thiamine, 100 mg, Oral, Daily        Continuous IV Infusions:  sodium chloride, 75 mL/hr, Intravenous, Continuous        PRN Meds:  ondansetron, 4 mg, Intravenous, Q6H PRN           None     Network Utilization Review Department  ATTENTION: Please call with any questions or concerns to [phone] and carefully listen to the prompts so that you are directed to the right person  All voicemails are confidential   [facility] all requests for admission clinical reviews, approved or denied determinations and any other requests to dedicated fax number below belonging to the campus where the patient is receiving treatment   List of dedicated fax numbers for the Facilities:  FACILITY NAME UR FAX NUMBER   ADMISSION DENIALS (Administrative/Medical Necessity) [phone]   PARENT CHILD HEALTH (Maternity/NICU/Pediatrics) [address] [facility],[address] [facility]   [id number] [facility] [phone]   Andria Chavez [phone]   [place] [phone]   [place] Oas [phone]   Janas Comment [phone]   [address] [phone]   Johana Church MD   Physician   Hospitalist   Discharge Summary       Signed   Date of Service:  9/4/2022  4:12 PM                 Signed              Show:Clear all  [x]Manual[x]Template[x]Copied    Added by:  Kenna Suarez MD      []ver for details    [address]  Discharge- [address] [facility] 1970, 46 y o  male MRN: [id number]  [facility] [id number] Encounter: [id number]  Primary Care Provider: Shena Herman   Date and time admitted to hospital: 9/1/2022  9:21 AM              Admission Date: 9/1/2022       Discharge Date:  09/04/2022           Primary Diagnoses  Principal Problem (Resolved):    Weakness  Active Problems:    Type 2 diabetes mellitus with stage 3 chronic kidney disease, with long-term current use of insulin ([address] )    Essential hypertension    Uncomplicated alcohol dependence ([address] )    Paroxysmal A-fib ([address] )    CKD (chronic kidney disease), stage III ([address] )    Hypokalemia    Cocaine use        Hospital Course by problem:  Rosy Crew as of 9/4/2022  Assessment & Plan  Patient is a 44-year-old male with past medical history significant for insulin-dependent diabetes, paroxysmal atrial fibrillation, and alcohol abuse who presented to the ER for evaluation of generalized weakness     · Patient reported generalized weakness, fatigue, without any focal symptoms  Poor po intake x 24h without n/v/d or abd pain  · Was admitted to the hospital to undergo further evaluation[de-identified]  Noted that his generalized weakness was secondary to hypoglycemia, alcohol abuse, and cocaine use  · A) metabolic workup:   ? undetectable alcohol level, despite patient relating a history of daily drinking  ? B12/folate without deficit  ? TSH wnl  ? Normal cortisol  ? Patient was noted to have hypoglycemia with a BS after admission of 39:    ? Pt notes he has not checked his BS in over 5d  Has been taking his insulin, but not the past 2 days  ? Patient was recently admitted 7/22 with generalized weakness secondary to hypoglycemia and at discharge he was instructed to decrease his home Lantus from 15 units down to 10  Patient initially stated he was taking Lantus 15 units once a day and now states he had been taking Lantus 26 units once a day  He notes he checks his blood sugars only once in a while  ? Patient's regimen was titrated as discussed further below:   Will be discharged on Lantus 10 units daily    B) infectious workup:    -patient with mild leukocytosis of 11:     -blood cx neg thus far    - CXR negative    - urinalysis negative for UTI  c) no focal neurologic deficits on exam   -negative head CT  D) toxic metabolic weakness  -patient with history of alcohol abuse:  Level undetectable on admission:  Counseled for cessation  -positive cocaine use on counseled for cessation  E) cardiac:  No evidence of ischemia on EKG or enzymes     Type 2 diabetes mellitus with stage 3 chronic kidney disease, with long-term current use of insulin [facility])  Assessment & Plan        Lab Results   Component Value Date     HGBA1C 6 7 (H) 07/30/2022                Recent Labs 09/04/22  0026 09/04/22  0739 09/04/22  1119 09/04/22  1429   POCGLU 186* 448* 342* 208*         Blood Sugar Average: Last 72 hrs:  (P) 235 [id number]      · Patient has diabetes type 2, with long-term use of insulin, with associated chronic kidney disease stage 3  · Patient has a history of variable compliance  · Prior admissions for hypoglycemia 7/22, and on his discharge he was instructed to decrease his Lantus from 15 units down to 10 units once daily  On admission patient states he was still taking 15 units once daily, and later he stated he was taking 26 units once daily  Patient notes he checks his blood sugars only once in a while  · Pt was noted to have hypoglycemia after admission with a blood sugar of 39  · Patient was tolerating p o  without any miss meals but had a subsequent blood sugar of 61 on the morning of 9/2  · Cortisol level normal  · Patient was monitored off all insulin, to evaluate if he had persistent hypoglycemia and would be workup for insulinoma ex cetera  · Patient had subsequent hyperglycemia  His insulin regimen was restarted and titrated  · Had family discussion with patient, his brother, and father:  Recommend patient be on treatment with Lantus once a day, and Humalog 3 times a day before meals  Patient states he will not do this, however will use Lantus once a day  · Patient be discharged on Lantus 10 units once daily in the morning  · Discussed with him the importance of eating 3 meals a day plus a bedtime snack  Patient's family notes that he frequently does not eat throughout the day  Patient states he does not get hungry  He was educated regarding the risk of hypoglycemia if he skipped any meals  · Also discussed with patient the recommendation for Humalog 3 times a day before meals which she is declining  · Discussed with patient the importance of checking his blood sugars 3 times a day    He declines  · Prescription sent for testing supplies to his pharmacy     Cocaine use  Assessment & Plan  · Urine drug screen was positive for cocaine use  · Patient was counseled for cessation     Hypokalemia  Assessment & Plan  · Patient with hypokalemia  · Repleted and improved  · Repleted magnesium     CKD (chronic kidney disease), stage III [facility])  Assessment & Plan        Lab Results   Component Value Date     EGFR 77 09/03/2022     EGFR 82 09/02/2022     EGFR 65 09/01/2022     CREATININE 1 09 09/03/2022     CREATININE 1 04 09/02/2022     CREATININE 1 26 09/01/2022      · Creatinine currently at baseline of 1 0-1 2     Paroxysmal A-fib ([facility] )  Assessment & Plan  · Patient has paroxysmal atrial fibrillation  · Is prescribed metoprolol 12 5 mg b i d   · Noted to have medication and follow-up noncompliance and is not currently prescribed anticoagulation, with h/o alcohol abuse     Uncomplicated alcohol dependence ([facility] )  Assessment & Plan  · Patient has a history of alcohol abuse  · Notes he drinks every day, all day  Unable to quantify further  · Pre-contemplative on cessation  · Alcohol level undetectable on admission  · Patient was placed on CIWA scale, thiamine, folic acid  · Discussed with patient, and his family members the recommendations for complete cessation  His family members supportive this recommendation    Patient does not yet commit to abstinence or changing his intake     Essential hypertension  Assessment & Plan  · Patient has essential hypertension  · Will be discharged to continue his home medications with lisinopril/HCTZ 20-12 5 mg daily, and metoprolol 12 5 mg q 12 hours  ·             Service:  [facility]      Consulting Providers   None     Procedures Performed   None     Hospital Studies:  9/1:  Chest x-ray:  No acute cardiopulmonary disease     9/1:  CT head:  No acute intracranial abnormality     Microbiology  9/1 blood culture:  Pending x2  9/1:  Negative COVID  9/1:  Negative COVID, influenza, RSV    Results from last 7 days   Lab Units 09/02/22  0523 09/01/22  1031   WBC Thousand/uL 11 23* 11 34*   HEMOGLOBIN g/dL 13 6 15 2   HEMATOCRIT % 37 1 41 1   PLATELETS Thousands/uL 182 182             Results from last 7 days   Lab Units 09/03/22  0511 09/02/22  0523 09/01/22  1031   POTASSIUM mmol/L 3 7 3 1* 3 1*   CHLORIDE mmol/L 104 105 102   CO2 mmol/L 23 26 29   BUN mg/dL 13 12 11   CREATININE mg/dL 1 09 1 04 1 26   CALCIUM mg/dL 8 2* 8 5 9 2         History and Physical Exam:  Please refer to the Admission H&P note     Discharge Condition: Improved  Discharge Disposition: Home/Self Care     Discharge Note and Physical Exam:   Patient is examined and interviewed by me in Telugu at the bedside  Patient's father, and brother, or present during the interview, after receiving patient's permission  Patient denies any complaints  Denies any pain  Denies any shortness of breath  Notes he is tolerating p o        Patient now states he was taking Lantus 26 units once a day at home  He states he checks his blood sugars only once in a while  Patient's brother notes patient has poor p o  intake at home and frequently does not eat  Patient states is because he is not hungry  Patient states he will inject himself only once a day  He declines Humalog insulin or inject himself more than once a day      Patient notes that he has a working glucometer at home  He states he has testing supplies, lancets, and strips  His brother request a refill be called to his pharmacy just in case         Vitals:     09/04/22 1532   BP: 145/74   Pulse: 65   Resp: 17   Temp:     SpO2: 98%      General:  Pleasant, non-tachypnic, non-dyspnic  Conversant  Not tachypneic  Heart: Regular rate and rhythm, S1S2 present  No murmur, rub or gallop  Lungs: Clear to auscultation bilaterally, no wheezing, rhonchi, or crackles  Good air movement  No accessory muscle use or respiratory distress  Abdomen: soft, non-tender, non-distended, NABS  No rebound or guarding  No mass or peritoneal signs  Neurologic:  Awake and alert  Fluent speech  Interactive  Moving all 4 extremities  Skin: warm and dry  No petechiae, purpura or rash         Discharge Medications   Please see Medical Reconciliation Discharge Form     Discharge Follow Up Appointments:   ARIANE Jones: 1 week     Discharge  Statement   Total Time Spent today including physical exam, discussion with patient and discharge arrangements/care = 33 minutes      dw pts nurse  brandon       Family:  Updated patient, his father and brother, in [facility] [place] at the bedside  Review test results and treatment plan  Reviewed patient's generalized weakness was secondary to low blood sugar      Instructed patient to eat 3 meals a day plus a bedtime snack  Instructed patient to check his blood sugars at least 3 times a day  Recommended he use 2 types of insulin, Lantus once a day, and pre meal Humalog  He declines however notes he will inject Lantus once a day      Specifically stressed to patient, and his brother reinforce this 2 separate occasions that he has to take only 10 units of Lantus once a day, and to check his blood sugar 3 times a day    Stressed to patient that he cannot skip any meals because the Lantus last 24 hours and this would cause low blood sugars 2nd be dangerous      Stressed to patient that he needs to stop drinking as it can cause liver damage      This note has been constructed using a voice recognition system

## 2022-09-07 NOTE — TELEPHONE ENCOUNTER
----- Message from Cayden [place], [place] sent at 9/6/2022  7:46 AM EDT -----  I am marked as patient's PCP, I have never seen patient  He has not been seen here in > 2 years and has had multiple hospital admissions  Please contact patient to determine if he would like to continue care with our office and if so needs to be seen ASAP, if not please remove me as PCP  Thank you

## 2022-09-08 NOTE — UTILIZATION REVIEW
Notification of Discharge   This is a Notification of Discharge from our facility [address]  Please be advised that this patient has been discharge from our facility  Below you will find the admission and discharge date and time including the patients disposition  UTILIZATION REVIEW CONTACT:  Magalie Boudreaux  Utilization   Network Utilization Review Department  Phone: [phone] x carefully listen to the prompts  All voicemails are confidential   Email: [email or web address]     PHYSICIAN ADVISORY SERVICES:  FOR [facility] REVIEW - MEDICAL NECESSITY DENIAL  Phone: [phone]  Fax: [phone]  Email: [email or web address]     PRESENTATION DATE: 9/1/2022  9:21 AM    INPATIENT ADMISSION DATE: 9/1/22  2:15 PM   DISCHARGE DATE: 9/4/2022  5:12 PM  DISPOSITION: Home/Self Care Home/Self Care      IMPORTANT INFORMATION:  Send all requests for admission clinical reviews, approved or denied determinations and any other requests to dedicated fax number below belonging to the campus where the patient is receiving treatment   List of dedicated fax numbers:  [address] (Administrative/Medical Necessity) [phone]   [address]  (Maternity/NICU/Pediatrics) [phone]   [facility] Jayashree [phone]   [address] [facility] [phone]   [address] [place] [address] [phone]   [address] [place] [address] [phone]   Addison Simmers [phone]   Gayl Able [phone]   Inessa Underwood [phone]   [facility]  [phone]   [address] [place] [address]  [phone]   [facility] [phone]   Nino Leon [phone]             He Becker MD   Physician   Hospitalist   Discharge Summary       Signed   Date of Service:  9/4/2022  4:12 PM                 Signed              Show:Clear all  [x]Manual[x]Template[x]Copied    Added by:  Connie Da Silva MD      [][facility] for details    [address]  Discharge- [address] 1970, 46 y o  male MRN: [id number]  Unit/Bed#: [address] 87 214-01 Encounter: [id number]  Primary Care Provider: ARIANE Maxwell   Date and time admitted to hospital: 9/1/2022  9:21 AM              Admission Date: 9/1/2022       Discharge Date:  09/04/2022           Primary Diagnoses  Principal Problem (Resolved):    Weakness  Active Problems:    Type 2 diabetes mellitus with stage 3 chronic kidney disease, with long-term current use of insulin (HCC)    Essential hypertension    Uncomplicated alcohol dependence (HCC)    Paroxysmal A-fib ([facility] )    CKD (chronic kidney disease), stage III ([facility] )    Hypokalemia    Cocaine use        Hospital Course by problem:  Ione Schlatter as of 9/4/2022  Assessment & Plan  Patient is a 59-year-old male with past medical history significant for insulin-dependent diabetes, paroxysmal atrial fibrillation, and alcohol abuse who presented to the ER for evaluation of generalized weakness     · Patient reported generalized weakness, fatigue, without any focal symptoms  Poor po intake x 24h without n/v/d or abd pain  · Was admitted to the hospital to undergo further evaluation[de-identified]  Noted that his generalized weakness was secondary to hypoglycemia, alcohol abuse, and cocaine use  · A) metabolic workup:   ? undetectable alcohol level, despite patient relating a history of daily drinking  ? B12/folate without deficit  ? TSH wnl  ? Normal cortisol  ? Patient was noted to have hypoglycemia with a BS after admission of 39:    ? Pt notes he has not checked his BS in over 5d  Has been taking his insulin, but not the past 2 days  ? Patient was recently admitted 7/22 with generalized weakness secondary to hypoglycemia and at discharge he was instructed to decrease his home Lantus from 15 units down to 10   Patient initially stated he was taking Lantus 15 units once a day and now states he had been taking Lantus 26 units once a day  He notes he checks his blood sugars only once in a while  ? Patient's regimen was titrated as discussed further below: Will be discharged on Lantus 10 units daily    B) infectious workup:    -patient with mild leukocytosis of 11:     -blood cx neg thus far    - CXR negative    - urinalysis negative for UTI  c) no focal neurologic deficits on exam   -negative head CT  D) toxic metabolic weakness  -patient with history of alcohol abuse:  Level undetectable on admission:  Counseled for cessation  -positive cocaine use on counseled for cessation  E) cardiac:  No evidence of ischemia on EKG or enzymes     Type 2 diabetes mellitus with stage 3 chronic kidney disease, with long-term current use of insulin [facility])  Assessment & Plan        Lab Results   Component Value Date     HGBA1C 6 7 (H) 07/30/2022                Recent Labs     09/04/22  0026 09/04/22  0739 09/04/22  1119 09/04/22  1429   POCGLU 186* 448* 342* 208*         Blood Sugar Average: Last 72 hrs:  (P) 235 [id number]      · Patient has diabetes type 2, with long-term use of insulin, with associated chronic kidney disease stage 3  · Patient has a history of variable compliance  · Prior admissions for hypoglycemia 7/22, and on his discharge he was instructed to decrease his Lantus from 15 units down to 10 units once daily  On admission patient states he was still taking 15 units once daily, and later he stated he was taking 26 units once daily    Patient notes he checks his blood sugars only once in a while  · Pt was noted to have hypoglycemia after admission with a blood sugar of 39  · Patient was tolerating p o  without any miss meals but had a subsequent blood sugar of 61 on the morning of 9/2  · Cortisol level normal  · Patient was monitored off all insulin, to evaluate if he had persistent hypoglycemia and would be workup for insulinoma ex Silvia Pham  · Patient had subsequent hyperglycemia  His insulin regimen was restarted and titrated  · Had family discussion with patient, his brother, and father:  Recommend patient be on treatment with Lantus once a day, and Humalog 3 times a day before meals  Patient states he will not do this, however will use Lantus once a day  · Patient be discharged on Lantus 10 units once daily in the morning  · Discussed with him the importance of eating 3 meals a day plus a bedtime snack  Patient's family notes that he frequently does not eat throughout the day  Patient states he does not get hungry  He was educated regarding the risk of hypoglycemia if he skipped any meals  · Also discussed with patient the recommendation for Humalog 3 times a day before meals which she is declining  · Discussed with patient the importance of checking his blood sugars 3 times a day  He declines  · Prescription sent for testing supplies to his pharmacy     Cocaine use  Assessment & Plan  · Urine drug screen was positive for cocaine use  · Patient was counseled for cessation     Hypokalemia  Assessment & Plan  · Patient with hypokalemia  · Repleted and improved  · Repleted magnesium     CKD (chronic kidney disease), stage III [facility])  Assessment & Plan        Lab Results   Component Value Date     EGFR 77 09/03/2022     EGFR 82 09/02/2022     EGFR 65 09/01/2022     CREATININE 1 09 09/03/2022     CREATININE 1 04 09/02/2022     CREATININE 1 26 09/01/2022      · Creatinine currently at baseline of 1 0-1 2     Paroxysmal A-fib ([facility] )  Assessment & Plan  · Patient has paroxysmal atrial fibrillation  · Is prescribed metoprolol 12 5 mg b i d   · Noted to have medication and follow-up noncompliance and is not currently prescribed anticoagulation, with h/o alcohol abuse     Uncomplicated alcohol dependence ([facility] )  Assessment & Plan  · Patient has a history of alcohol abuse  · Notes he drinks every day, all day    Unable to quantify further  · Pre-contemplative on cessation  · Alcohol level undetectable on admission  · Patient was placed on CIWA scale, thiamine, folic acid  · Discussed with patient, and his family members the recommendations for complete cessation  His family members supportive this recommendation  Patient does not yet commit to abstinence or changing his intake     Essential hypertension  Assessment & Plan  · Patient has essential hypertension  · Will be discharged to continue his home medications with lisinopril/HCTZ 20-12 5 mg daily, and metoprolol 12 5 mg q 12 hours  ·             Service:  [facility]      Consulting Providers   None     Procedures Performed   None     Hospital Studies:  9/1:  Chest x-ray:  No acute cardiopulmonary disease     9/1:  CT head:  No acute intracranial abnormality     Microbiology  9/1 blood culture:  Pending x2  9/1:  Negative COVID  9/1:  Negative COVID, influenza, RSV           Results from last 7 days   Lab Units 09/02/22  0523 09/01/22  1031   WBC Thousand/uL 11 23* 11 34*   HEMOGLOBIN g/dL 13 6 15 2   HEMATOCRIT % 37 1 41 1   PLATELETS Thousands/uL 182 182             Results from last 7 days   Lab Units 09/03/22  0511 09/02/22  0523 09/01/22  1031   POTASSIUM mmol/L 3 7 3 1* 3 1*   CHLORIDE mmol/L 104 105 102   CO2 mmol/L 23 26 29   BUN mg/dL 13 12 11   CREATININE mg/dL 1 09 1 04 1 26   CALCIUM mg/dL 8 2* 8 5 9 2         History and Physical Exam:  Please refer to the Admission H&P note     Discharge Condition: Improved  Discharge Disposition: Home/Self Care     Discharge Note and Physical Exam:   Patient is examined and interviewed by me in Belarusian at the bedside  Patient's father, and brother, or present during the interview, after receiving patient's permission  Patient denies any complaints  Denies any pain  Denies any shortness of breath  Notes he is tolerating p o        Patient now states he was taking Lantus 26 units once a day at home   He states he checks his blood sugars only once in a while  Patient's brother notes patient has poor p o  intake at home and frequently does not eat  Patient states is because he is not hungry  Patient states he will inject himself only once a day  He declines Humalog insulin or inject himself more than once a day      Patient notes that he has a working glucometer at home  He states he has testing supplies, lancets, and strips  His brother request a refill be called to his pharmacy just in case         Vitals:     09/04/22 1532   BP: 145/74   Pulse: 65   Resp: 17   Temp:     SpO2: 98%      General:  Pleasant, non-tachypnic, non-dyspnic  Conversant  Not tachypneic  Heart: Regular rate and rhythm, S1S2 present  No murmur, rub or gallop  Lungs: Clear to auscultation bilaterally, no wheezing, rhonchi, or crackles  Good air movement  No accessory muscle use or respiratory distress  Abdomen: soft, non-tender, non-distended, NABS  No rebound or guarding  No mass or peritoneal signs  Neurologic:  Awake and alert  Fluent speech  Interactive  Moving all 4 extremities  Skin: warm and dry  No petechiae, purpura or rash         Discharge Medications   Please see Medical Reconciliation Discharge Form     Discharge Follow Up Appointments:   ARIANE Silverman: 1 week     Discharge  Statement   Total Time Spent today including physical exam, discussion with patient and discharge arrangements/care = 33 minutes      dw pts nurse  dw       Family:  Updated patient, his father and brother, in [facility] at the bedside  Review test results and treatment plan  Reviewed patient's generalized weakness was secondary to low blood sugar      Instructed patient to eat 3 meals a day plus a bedtime snack  Instructed patient to check his blood sugars at least 3 times a day  Recommended he use 2 types of insulin, Lantus once a day, and pre meal Humalog    He declines however notes he will inject Lantus once a day      Specifically stressed to patient, and his brother reinforce this 2 separate occasions that he has to take only 10 units of Lantus once a day, and to check his blood sugar 3 times a day    Stressed to patient that he cannot skip any meals because the Lantus last 24 hours and this would cause low blood sugars 2nd be dangerous      Stressed to patient that he needs to stop drinking as it can cause liver damage      This note has been constructed using a voice recognition system

## 2022-11-22 ENCOUNTER — APPOINTMENT (EMERGENCY)
Dept: RADIOLOGY | Facility: HOSPITAL | Age: 52
End: 2022-11-22

## 2022-11-22 ENCOUNTER — HOSPITAL ENCOUNTER (OUTPATIENT)
Facility: HOSPITAL | Age: 52
Setting detail: OBSERVATION
Discharge: HOME | End: 2022-11-24
Attending: EMERGENCY MEDICINE | Admitting: INTERNAL MEDICINE

## 2022-11-22 DIAGNOSIS — I10 ESSENTIAL HYPERTENSION: ICD-10-CM

## 2022-11-22 DIAGNOSIS — T68.XXXA HYPOTHERMIA, INITIAL ENCOUNTER: ICD-10-CM

## 2022-11-22 DIAGNOSIS — E16.2 HYPOGLYCEMIA: Primary | ICD-10-CM

## 2022-11-22 PROBLEM — D72.829 LEUKOCYTOSIS: Status: ACTIVE | Noted: 2022-11-22

## 2022-11-22 LAB
2HR DELTA HS TROPONIN: -2 NG/L
4HR DELTA HS TROPONIN: -5 NG/L
ANION GAP SERPL CALCULATED.3IONS-SCNC: 7 MMOL/L (ref 4–13)
APTT PPP: 22 SECONDS (ref 23–37)
ATRIAL RATE: 55 BPM
BACTERIA UR QL AUTO: ABNORMAL /HPF
BASOPHILS # BLD AUTO: 0.04 THOUSANDS/ÂΜL (ref 0–0.1)
BASOPHILS NFR BLD AUTO: 0 % (ref 0–1)
BILIRUB UR QL STRIP: NEGATIVE
BUN SERPL-MCNC: 8 MG/DL (ref 5–25)
CALCIUM SERPL-MCNC: 8.5 MG/DL (ref 8.3–10.1)
CARDIAC TROPONIN I PNL SERPL HS: 21 NG/L
CARDIAC TROPONIN I PNL SERPL HS: 24 NG/L
CARDIAC TROPONIN I PNL SERPL HS: 26 NG/L
CHLORIDE SERPL-SCNC: 103 MMOL/L (ref 96–108)
CLARITY UR: CLEAR
CO2 SERPL-SCNC: 32 MMOL/L (ref 21–32)
COLOR UR: YELLOW
CREAT SERPL-MCNC: 0.84 MG/DL (ref 0.6–1.3)
EOSINOPHIL # BLD AUTO: 0.18 THOUSAND/ÂΜL (ref 0–0.61)
EOSINOPHIL NFR BLD AUTO: 1 % (ref 0–6)
ERYTHROCYTE [DISTWIDTH] IN BLOOD BY AUTOMATED COUNT: 12.5 % (ref 11.6–15.1)
ETHANOL SERPL-MCNC: <3 MG/DL (ref 0–3)
GFR SERPL CREATININE-BSD FRML MDRD: 100 ML/MIN/1.73SQ M
GLUCOSE SERPL-MCNC: 105 MG/DL (ref 65–140)
GLUCOSE SERPL-MCNC: 142 MG/DL (ref 65–140)
GLUCOSE SERPL-MCNC: 156 MG/DL (ref 65–140)
GLUCOSE SERPL-MCNC: 161 MG/DL (ref 65–140)
GLUCOSE SERPL-MCNC: 211 MG/DL (ref 65–140)
GLUCOSE SERPL-MCNC: 34 MG/DL (ref 65–140)
GLUCOSE SERPL-MCNC: 35 MG/DL (ref 65–140)
GLUCOSE SERPL-MCNC: 42 MG/DL (ref 65–140)
GLUCOSE SERPL-MCNC: 47 MG/DL (ref 65–140)
GLUCOSE SERPL-MCNC: 52 MG/DL (ref 65–140)
GLUCOSE SERPL-MCNC: 65 MG/DL (ref 65–140)
GLUCOSE SERPL-MCNC: 96 MG/DL (ref 65–140)
GLUCOSE UR STRIP-MCNC: ABNORMAL MG/DL
HCT VFR BLD AUTO: 35.4 % (ref 36.5–49.3)
HGB BLD-MCNC: 12.6 G/DL (ref 12–17)
HGB UR QL STRIP.AUTO: NEGATIVE
IMM GRANULOCYTES # BLD AUTO: 0.06 THOUSAND/UL (ref 0–0.2)
IMM GRANULOCYTES NFR BLD AUTO: 0 % (ref 0–2)
INR PPP: 0.9 (ref 0.84–1.19)
KETONES UR STRIP-MCNC: NEGATIVE MG/DL
LACTATE SERPL-SCNC: 1.8 MMOL/L (ref 0.5–2)
LEUKOCYTE ESTERASE UR QL STRIP: NEGATIVE
LYMPHOCYTES # BLD AUTO: 1.93 THOUSANDS/ÂΜL (ref 0.6–4.47)
LYMPHOCYTES NFR BLD AUTO: 14 % (ref 14–44)
MAGNESIUM SERPL-MCNC: 1.8 MG/DL (ref 1.6–2.6)
MCH RBC QN AUTO: 32 PG (ref 26.8–34.3)
MCHC RBC AUTO-ENTMCNC: 35.6 G/DL (ref 31.4–37.4)
MCV RBC AUTO: 90 FL (ref 82–98)
MONOCYTES # BLD AUTO: 0.96 THOUSAND/ÂΜL (ref 0.17–1.22)
MONOCYTES NFR BLD AUTO: 7 % (ref 4–12)
NEUTROPHILS # BLD AUTO: 10.78 THOUSANDS/ÂΜL (ref 1.85–7.62)
NEUTS SEG NFR BLD AUTO: 78 % (ref 43–75)
NITRITE UR QL STRIP: NEGATIVE
NON-SQ EPI CELLS URNS QL MICRO: ABNORMAL /HPF
NRBC BLD AUTO-RTO: 0 /100 WBCS
P AXIS: 31 DEGREES
PH UR STRIP.AUTO: 7.5 [PH]
PLATELET # BLD AUTO: 280 THOUSANDS/UL (ref 149–390)
PMV BLD AUTO: 10.1 FL (ref 8.9–12.7)
POTASSIUM SERPL-SCNC: 2.6 MMOL/L (ref 3.5–5.3)
PR INTERVAL: 168 MS
PROCALCITONIN SERPL-MCNC: <0.05 NG/ML
PROT UR STRIP-MCNC: ABNORMAL MG/DL
PROTHROMBIN TIME: 12.2 SECONDS (ref 11.6–14.5)
QRS AXIS: 37 DEGREES
QRSD INTERVAL: 104 MS
QT INTERVAL: 492 MS
QTC INTERVAL: 470 MS
RBC # BLD AUTO: 3.94 MILLION/UL (ref 3.88–5.62)
RBC #/AREA URNS AUTO: ABNORMAL /HPF
SODIUM SERPL-SCNC: 142 MMOL/L (ref 135–147)
SP GR UR STRIP.AUTO: 1.02 (ref 1–1.03)
T WAVE AXIS: -12 DEGREES
UROBILINOGEN UR QL STRIP.AUTO: 1 E.U./DL
VENTRICULAR RATE: 55 BPM
WBC # BLD AUTO: 13.95 THOUSAND/UL (ref 4.31–10.16)
WBC #/AREA URNS AUTO: ABNORMAL /HPF

## 2022-11-22 RX ORDER — LISINOPRIL 20 MG/1
20 TABLET ORAL DAILY
Status: CANCELLED | OUTPATIENT
Start: 2022-11-22

## 2022-11-22 RX ORDER — POTASSIUM CHLORIDE 20 MEQ/1
40 TABLET, EXTENDED RELEASE ORAL ONCE
Status: COMPLETED | OUTPATIENT
Start: 2022-11-22 | End: 2022-11-22

## 2022-11-22 RX ORDER — ONDANSETRON 2 MG/ML
4 INJECTION INTRAMUSCULAR; INTRAVENOUS EVERY 6 HOURS PRN
Status: DISCONTINUED | OUTPATIENT
Start: 2022-11-22 | End: 2022-11-24 | Stop reason: HOSPADM

## 2022-11-22 RX ORDER — HYDROCHLOROTHIAZIDE 12.5 MG/1
12.5 TABLET ORAL DAILY
Status: CANCELLED | OUTPATIENT
Start: 2022-11-22

## 2022-11-22 RX ORDER — DEXTROSE MONOHYDRATE 25 G/50ML
INJECTION, SOLUTION INTRAVENOUS
Status: COMPLETED
Start: 2022-11-22 | End: 2022-11-22

## 2022-11-22 RX ORDER — HEPARIN SODIUM 5000 [USP'U]/ML
5000 INJECTION, SOLUTION INTRAVENOUS; SUBCUTANEOUS EVERY 8 HOURS SCHEDULED
Status: DISCONTINUED | OUTPATIENT
Start: 2022-11-22 | End: 2022-11-24 | Stop reason: HOSPADM

## 2022-11-22 RX ORDER — ACETAMINOPHEN 325 MG/1
650 TABLET ORAL EVERY 6 HOURS PRN
Status: DISCONTINUED | OUTPATIENT
Start: 2022-11-22 | End: 2022-11-24 | Stop reason: HOSPADM

## 2022-11-22 RX ORDER — INSULIN LISPRO 100 [IU]/ML
1-5 INJECTION, SOLUTION INTRAVENOUS; SUBCUTANEOUS
Status: DISCONTINUED | OUTPATIENT
Start: 2022-11-22 | End: 2022-11-24 | Stop reason: HOSPADM

## 2022-11-22 RX ORDER — MAGNESIUM HYDROXIDE/ALUMINUM HYDROXICE/SIMETHICONE 120; 1200; 1200 MG/30ML; MG/30ML; MG/30ML
30 SUSPENSION ORAL EVERY 6 HOURS PRN
Status: DISCONTINUED | OUTPATIENT
Start: 2022-11-22 | End: 2022-11-24 | Stop reason: HOSPADM

## 2022-11-22 RX ORDER — DEXTROSE MONOHYDRATE 25 G/50ML
25 INJECTION, SOLUTION INTRAVENOUS ONCE
Status: COMPLETED | OUTPATIENT
Start: 2022-11-22 | End: 2022-11-22

## 2022-11-22 RX ORDER — POTASSIUM CHLORIDE 14.9 MG/ML
20 INJECTION INTRAVENOUS
Status: COMPLETED | OUTPATIENT
Start: 2022-11-22 | End: 2022-11-23

## 2022-11-22 RX ORDER — HYDROCHLOROTHIAZIDE 12.5 MG/1
12.5 TABLET ORAL DAILY
Status: DISCONTINUED | OUTPATIENT
Start: 2022-11-23 | End: 2022-11-24 | Stop reason: HOSPADM

## 2022-11-22 RX ORDER — LISINOPRIL 20 MG/1
20 TABLET ORAL DAILY
Status: DISCONTINUED | OUTPATIENT
Start: 2022-11-23 | End: 2022-11-23

## 2022-11-22 RX ADMIN — CEFTRIAXONE 1000 MG: 1 INJECTION, POWDER, FOR SOLUTION INTRAMUSCULAR; INTRAVENOUS at 20:19

## 2022-11-22 RX ADMIN — DEXTROSE MONOHYDRATE 25 G: 25 INJECTION, SOLUTION INTRAVENOUS at 13:28

## 2022-11-22 RX ADMIN — POTASSIUM CHLORIDE 20 MEQ: 14.9 INJECTION, SOLUTION INTRAVENOUS at 17:14

## 2022-11-22 RX ADMIN — HEPARIN SODIUM 5000 UNITS: 5000 INJECTION INTRAVENOUS; SUBCUTANEOUS at 17:15

## 2022-11-22 RX ADMIN — Medication 12.5 MG: at 22:03

## 2022-11-22 RX ADMIN — POTASSIUM CHLORIDE 20 MEQ: 14.9 INJECTION, SOLUTION INTRAVENOUS at 15:00

## 2022-11-22 RX ADMIN — INSULIN LISPRO 1 UNITS: 100 INJECTION, SOLUTION INTRAVENOUS; SUBCUTANEOUS at 17:14

## 2022-11-22 RX ADMIN — POTASSIUM CHLORIDE 40 MEQ: 1500 TABLET, EXTENDED RELEASE ORAL at 15:01

## 2022-11-22 NOTE — PLAN OF CARE
Problem: PAIN - ADULT  Goal: Verbalizes/displays adequate comfort level or baseline comfort level  Description: Interventions:  - Encourage patient to monitor pain and request assistance  - Assess pain using appropriate pain scale  - Administer analgesics based on type and severity of pain and evaluate response  - Implement non-pharmacological measures as appropriate and evaluate response  - Consider cultural and social influences on pain and pain management  - Notify physician/advanced practitioner if interventions unsuccessful or patient reports new pain  Outcome: Progressing     Problem: SAFETY ADULT  Goal: Patient will remain free of falls  Description: INTERVENTIONS:  - Educate patient/family on patient safety including physical limitations  - Instruct patient to call for assistance with activity   - Consult OT/PT to assist with strengthening/mobility   - Keep Call bell within reach  - Keep bed low and locked with side rails adjusted as appropriate  - Keep care items and personal belongings within reach  - Initiate and maintain comfort rounds  - Make Fall Risk Sign visible to staff  - Offer Toileting every 2 Hours, in advance of need  - Initiate/Maintain bed alarm  - Obtain necessary fall risk management equipment:   - Apply yellow socks and bracelet for high fall risk patients  - Consider moving patient to room near nurses station  Outcome: Progressing  Goal: Maintain or return to baseline ADL function  Description: INTERVENTIONS:  -  Assess patient's ability to carry out ADLs; assess patient's baseline for ADL function and identify physical deficits which impact ability to perform ADLs (bathing, care of mouth/teeth, toileting, grooming, dressing, etc )  - Assess/evaluate cause of self-care deficits   - Assess range of motion  - Assess patient's mobility; develop plan if impaired  - Assess patient's need for assistive devices and provide as appropriate  - Encourage maximum independence but intervene and supervise when necessary  - Involve family in performance of ADLs  - Assess for home care needs following discharge   - Consider OT consult to assist with ADL evaluation and planning for discharge  - Provide patient education as appropriate  Outcome: Progressing     Problem: METABOLIC, FLUID AND ELECTROLYTES - ADULT  Goal: Electrolytes maintained within normal limits  Description: INTERVENTIONS:  - Monitor labs and assess patient for signs and symptoms of electrolyte imbalances  - Administer electrolyte replacement as ordered  - Monitor response to electrolyte replacements, including repeat lab results as appropriate  - Instruct patient on fluid and nutrition as appropriate  Outcome: Progressing  Goal: Fluid balance maintained  Description: INTERVENTIONS:  - Monitor labs   - Monitor I/O and WT  - Instruct patient on fluid and nutrition as appropriate  - Assess for signs & symptoms of volume excess or deficit  Outcome: Progressing  Goal: Glucose maintained within target range  Description: INTERVENTIONS:  - Monitor Blood Glucose as ordered  - Assess for signs and symptoms of hyperglycemia and hypoglycemia  - Administer ordered medications to maintain glucose within target range  - Assess nutritional intake and initiate nutrition service referral as needed  Outcome: Progressing

## 2022-11-22 NOTE — ASSESSMENT & PLAN NOTE
Lab Results   Component Value Date    EGFR 100 11/22/2022    EGFR 77 09/03/2022    EGFR 82 09/02/2022    CREATININE 0 84 11/22/2022    CREATININE 1 09 09/03/2022    CREATININE 1 04 09/02/2022   Renal function at baseline

## 2022-11-22 NOTE — ASSESSMENT & PLAN NOTE
Patient presents with lethargy related to hypoglycemia  Resolved with IV dextrose  Alert oriented x3

## 2022-11-22 NOTE — ASSESSMENT & PLAN NOTE
Not currently maintained on anticoagulation    Prior notes state due to noncompliance and alcohol abuse  Continue metoprolol

## 2022-11-22 NOTE — H&P
[address] [place] [address] [address] 1970, 46 y o  male MRN: [id number]  Unit/Bed#: ED 10 Encounter: [id number]  Primary Care Provider: No primary care provider on file  Date and time admitted to hospital: 11/22/2022 11:21 AM    Addendum:  Official read on check x-ray showed right lower lobe opacification versus atelectasis  Will start IV Rocephin  Initial procalcitonin negative, will recheck in the morning  If negative x2 consider discontinuing    * Acute encephalopathy  Assessment & Plan  Patient presents with lethargy related to hypoglycemia  Resolved with IV dextrose  Alert oriented x3      Hypothermia  Assessment & Plan  Likely related to hypoglycemia  Warmed in emergency department    Leukocytosis  Assessment & Plan  Likely reactive  Chest x-ray clear, UA unremarkable    Hypokalemia  Assessment & Plan  Likely related to the poor oral intake  Given 80 mEq in emergency department  Trend BMP    Hypoglycemia  Assessment & Plan  Patient has a well-controlled hemoglobin A1c with multiple episodes of hypoglycemia  Previous admissions for hypoglycemia have noted that patient has been reluctant to start oral diabetic medications and wishes to continue to use Lantus and only wants to inject once a day  Recommend monitoring off insulin at this time and likely on discharge  Will start Accu-Cheks and sliding scale insulin    CKD (chronic kidney disease), stage III [facility])  Assessment & Plan  Lab Results   Component Value Date    EGFR 100 11/22/2022    EGFR 77 09/03/2022    EGFR 82 09/02/2022    CREATININE 0 84 11/22/2022    CREATININE 1 09 09/03/2022    CREATININE 1 04 09/02/2022   Renal function at baseline    Paroxysmal A-fib [facility])  Assessment & Plan  Not currently maintained on anticoagulation    Prior notes state due to noncompliance and alcohol abuse  Continue metoprolol    Uncomplicated alcohol dependence ([facility] [address] )  Assessment & Plan  [facility] protocol    Essential hypertension  Assessment & Plan  Elevated in emergency department  Resume home medications    VTE Prophylaxis:  Heparin  Code Status:  Level 1 full code    Anticipated Length of Stay:  Patient will be admitted on an Observation basis with an anticipated length of stay of less than 2 midnights  Justification for Hospital Stay:  Monitoring of hypoglycemia    Total Time for Visit, including Counseling / Coordination of Care: 60 minutes  Greater than 50% of this total time spent on direct patient counseling and coordination of care  Chief Complaint:   Acute encephalopathy, hypoglycemia    History of Present Illness:    López Berrios is a 46 y o  male With past medical history of insulin-dependent type 2 diabetes, paroxysmal atrial fibrillation, hypertension who presents with acute encephalopathy and hypoglycemia  EMS was called by patient's parents due to lethargy  Reportedly this is the 4th time this week the EMS has been called to evaluate patient's hypoglycemia  Patient was found to have a blood sugar 40 and hypothermic at 91  Patient reports he takes 25 units a day q h s  and decreases it to 10 units if he does eat much that day  Review of Systems:    Review of Systems   Constitutional: Negative for chills and fever  HENT: Negative for sore throat and trouble swallowing  Eyes: Negative for photophobia and visual disturbance  Respiratory: Negative for shortness of breath and wheezing  Cardiovascular: Negative for chest pain and palpitations  Gastrointestinal: Negative for constipation, diarrhea, nausea and vomiting  Genitourinary: Negative for difficulty urinating and dysuria  Musculoskeletal: Negative for arthralgias and myalgias  Skin: Negative for rash and wound  Neurological: Negative for dizziness, light-headedness and headaches         Past Medical and Surgical History:     Past Medical History:   Diagnosis Date   • Alcohol abuse    • Chronic pancreatitis ([facility] )    • Diabetes mellitus ([facility] )     Type 2   • Pancreatitis    • Paroxysmal A-fib (HCC)    • Thrombocytopenia ([address] )        Past Surgical History:   Procedure Laterality Date   • INCISION AND DRAINAGE OF WOUND N/A 8/16/2020    Procedure: INCISION AND DRAINAGE (I&D) HEAD/FACE;  Surgeon: Brent Gamez DMD;  [facility];  Service: Maxillofacial   • IR BIOPSY BONE MARROW  2/7/2019   • REMOVAL OF IMPACTED TOOTH - COMPLETELY BONY N/A 8/16/2020    Procedure: EXTRACTION TEETH MULTIPLE #2, 3;  Surgeon: Brent Gamez DMD;  [facility];  Service: Maxillofacial       Meds/Allergies:    Prior to Admission medications    Medication Sig Start Date End Date Taking?  Authorizing Provider   Basaglar KwikPen 100 units/mL SOPN Inject 0 1 mL (10 Units total) under the skin daily 9/4/22 10/4/22  Jocelyn Grant MD   Blood Glucose Monitoring Suppl (Accu-Chek Guide) w/Device KIT Use 3 (three) times a day 9/4/22   Jocelyn Grant MD   glucose blood (Accu-Chek Guide) test strip Check blood sugar 3 times a day 9/4/22   Jocelyn Grant MD   Insulin Pen Needle (Unifine Pentips) 32G X 4 MM MISC Inject as directed daily 9/4/22 10/24/22  Jocelyn Grant MD   Lancets (accu-chek multiclix) lancets Check blood sugar 3 times a day 9/4/22   Jocelyn Grant MD   lisinopril-hydrochlorothiazide [facility]) 20-12 5 MG per tablet Take 1 tablet by mouth daily 7/30/22 8/29/22  Jose Momin MD   metoprolol tartrate (LOPRESSOR) 25 mg tablet Take 0 5 tablets (12 5 mg total) by mouth every 12 (twelve) hours 7/30/22 8/29/22  Jose Momin MD   metFORMIN (GLUCOPHAGE) 500 mg tablet Take 1 tablet (500 mg total) by mouth 2 (two) times a day with meals 7/30/22 7/30/22  Jose Momin MD       Allergies: No Known Allergies    Social History:     Marital Status: Single   Substance Use History:   Social History     Substance and Sexual Activity   Alcohol Use Yes     Social History     Tobacco Use   Smoking Status Former   Smokeless Tobacco Never     Social History     Substance and Sexual Activity   Drug Use Yes   • Types: Cocaine       Family History:    Pertinent family history reviewed    Physical Exam:     Vitals:   Blood Pressure: 127/61 (11/22/22 1545)  Pulse: 78 (11/22/22 1545)  Temperature: (!) 97 4 °F (36 3 °C) (11/22/22 1324)  Temp Source: Oral (11/22/22 1324)  Respirations: 21 (11/22/22 1545)  Weight - Scale: 73 3 kg (161 lb 9 6 oz) (11/22/22 1128)  SpO2: 99 % (11/22/22 1545)    Physical Exam  Vitals reviewed  Constitutional:       General: He is not in acute distress  Appearance: He is well-developed  He is not ill-appearing, toxic-appearing or diaphoretic  HENT:      Head: Normocephalic and atraumatic  Mouth/Throat:      Mouth: Mucous membranes are moist       Pharynx: No oropharyngeal exudate  Eyes:      General: No scleral icterus  Extraocular Movements: Extraocular movements intact  Conjunctiva/sclera: Conjunctivae normal    Cardiovascular:      Rate and Rhythm: Normal rate and regular rhythm  Heart sounds: Normal heart sounds  Pulmonary:      Effort: Pulmonary effort is normal  No respiratory distress  Breath sounds: Normal breath sounds  No wheezing or rales  Abdominal:      General: There is no distension  Palpations: Abdomen is soft  Tenderness: There is no abdominal tenderness  There is no guarding or rebound  Musculoskeletal:         General: No swelling, tenderness or deformity  Skin:     General: Skin is warm and dry  Neurological:      General: No focal deficit present  Mental Status: He is alert  Mental status is at baseline  Psychiatric:         Mood and Affect: Mood normal          Behavior: Behavior normal          Thought Content:  Thought content normal          Judgment: Judgment normal           Additional Data:     Lab Results: I have reviewed pertinent results     Results from last 7 days   Lab Units 11/22/22  1313   WBC Thousand/uL 13 95*   HEMOGLOBIN g/dL 12 6   HEMATOCRIT % 35 4*   PLATELETS Thousands/uL 280   NEUTROS PCT % 78*   LYMPHS PCT % 14   MONOS PCT % 7   EOS PCT % 1     Results from last 7 days   Lab Units 11/22/22  1311   SODIUM mmol/L 142   POTASSIUM mmol/L 2 6*   CHLORIDE mmol/L 103   CO2 mmol/L 32   BUN mg/dL 8   CREATININE mg/dL 0 84   ANION GAP mmol/L 7   CALCIUM mg/dL 8 5   GLUCOSE RANDOM mg/dL 52*     Results from last 7 days   Lab Units 11/22/22  1302   INR  0 90     Results from last 7 days   Lab Units 11/22/22  1524 11/22/22  1452 11/22/22  1424 11/22/22  1348 11/22/22  1326 11/22/22  1142 11/22/22  1126   POC GLUCOSE mg/dl 156* 96 105 161* 42* 142* 47*         Results from last 7 days   Lab Units 11/22/22  1319 11/22/22  1302   LACTIC ACID mmol/L 1 8  --    PROCALCITONIN ng/ml  --  <0 05       Imaging: I have reviewed pertinent imaging     XR chest portable - 1 view   ED Interpretation by Isabelle Hamilton PA-C (11/22 0736)   No obvious acute cardiopulmonary abnormalities when read by me          EKG, Pathology, and Other Studies Reviewed on Admission:   · EKG: Reviewed     Allscripts / Epic Records Reviewed    ** Please Note: This note has been constructed using a voice recognition system   **

## 2022-11-22 NOTE — ED PROVIDER NOTES
History  Chief Complaint   Patient presents with   • Hypoglycemia - Symptomatic     Found by parents altered, history of DM and alcohol abuse, BS 43     Kori Iglesias is a 46 y o  male with past medical history of type 2 diabetes mellitus paroxysmal AFib, and alcohol abuse is presenting to the ED via EMS  EMS reports patient's parents called EMS multiple times this week to evaluate patient's hypoglycemia  EMS does report a treating hypoglycemia multiple times but patient refused to come to the ED  Today patient was hypoglycemic 40 and hypothermic at 91  EMS reports patient's blood sugar was 42 on arrival and he appeared lethargic  He was given D10  In the ED patient denies any or losing consciousness  He reports he was feeling dizzy so his father checked his blood sugar and it was low  He reports this is a recurrent problem and EMS has been called multiple times to his house for his low blood sugars  In the ED patient reports that he takes 25 units a day and decreases it to 10 units if he did not eat lot in a day  He reports that he frequently forgets to take his insulin but his father will remind him  Patient reports that he lives with his mother and father and feels safe  He denies being homeless or sleeping outside  He currently denies any chest pain, abdominal pain, burning with urination, cough, congestion, or fevers  He reports that the only pain he has is low back pain which is chronic and has been ongoing for many years  Prior to Admission Medications   Prescriptions Last Dose Informant Patient Reported? Taking?    Basaglar KwikPen 100 units/mL SOPN   No No   Sig: Inject 0 1 mL (10 Units total) under the skin daily   Blood Glucose Monitoring Suppl (Accu-Chek Guide) w/Device KIT   No No   Sig: Use 3 (three) times a day   Insulin Pen Needle (Unifine Pentips) 32G X 4 MM MISC   No No   Sig: Inject as directed daily   Lancets (accu-chek multiclix) lancets   No No   Sig: Check blood sugar 3 times a day   glucose blood (Accu-Chek Guide) test strip   No No   Sig: Check blood sugar 3 times a day   lisinopril-hydrochlorothiazide (PRINZIDE,ZESTORETIC) 20-12 5 MG per tablet   No No   Sig: Take 1 tablet by mouth daily   metoprolol tartrate (LOPRESSOR) 25 mg tablet   No No   Sig: Take 0 5 tablets (12 5 mg total) by mouth every 12 (twelve) hours      Facility-Administered Medications: None       Past Medical History:   Diagnosis Date   • Alcohol abuse    • Chronic pancreatitis ([place] [address] )    • Diabetes mellitus ([place] [address] )     Type 2   • Pancreatitis    • Paroxysmal A-fib ([place] [address] )    • Thrombocytopenia ([place] [address] )        Past Surgical History:   Procedure Laterality Date   • INCISION AND DRAINAGE OF WOUND N/A 8/16/2020    Procedure: INCISION AND DRAINAGE (I&D) HEAD/FACE;  Surgeon: Darryl Anguiano DMD;  [facility];  Service: Maxillofacial   • IR BIOPSY BONE MARROW  2/7/2019   • REMOVAL OF IMPACTED TOOTH - COMPLETELY BONY N/A 8/16/2020    Procedure: EXTRACTION TEETH MULTIPLE #2, 3;  Surgeon: Darryl Anguiano DMD;  [facility];  Service: Maxillofacial       Family History   Problem Relation Age of Onset   • Diabetes Mother    • Hypertension Mother    • Hyperlipidemia Father      I have reviewed and agree with the history as documented  E-Cigarette/Vaping     E-Cigarette/Vaping Substances     Social History     Tobacco Use   • Smoking status: Former   • Smokeless tobacco: Never   Substance Use Topics   • Alcohol use: Yes   • Drug use: Yes     Types: Cocaine       Review of Systems   Constitutional: Negative for chills and fever  HENT: Negative for ear pain and sore throat  Eyes: Negative for pain and visual disturbance  Respiratory: Negative for cough and shortness of breath  Cardiovascular: Negative for chest pain and palpitations  Gastrointestinal: Negative for abdominal pain and vomiting  Genitourinary: Negative for dysuria and hematuria  Musculoskeletal: Positive for back pain   Negative for arthralgias  Skin: Negative for color change and rash  Neurological: Positive for light-headedness  Negative for seizures and syncope  All other systems reviewed and are negative  Physical Exam  Physical Exam  Vitals and nursing note reviewed  Constitutional:       General: He is not in acute distress  Appearance: He is well-developed  HENT:      Head: Normocephalic and atraumatic  Eyes:      Conjunctiva/sclera: Conjunctivae normal    Cardiovascular:      Rate and Rhythm: Normal rate and regular rhythm  Heart sounds: No murmur heard  Pulmonary:      Effort: Pulmonary effort is normal  No respiratory distress  Breath sounds: Normal breath sounds  No stridor  No wheezing, rhonchi or rales  Abdominal:      Palpations: Abdomen is soft  Tenderness: There is no abdominal tenderness  Musculoskeletal:         General: No swelling  Cervical back: Neck supple  Skin:     General: Skin is cool and dry  Neurological:      Mental Status: He is oriented to person, place, and time  He is lethargic  GCS: GCS eye subscore is 4  GCS verbal subscore is 5  GCS motor subscore is 6     Psychiatric:         Mood and Affect: Mood normal          Vital Signs  ED Triage Vitals   Temperature Pulse Respirations Blood Pressure SpO2   11/22/22 1128 11/22/22 1128 11/22/22 1128 11/22/22 1128 11/22/22 1248   (!) 93 °F (33 9 °C) 61 20 (!) 193/91 100 %      Temp Source Heart Rate Source Patient Position - Orthostatic VS BP Location FiO2 (%)   11/22/22 1128 11/22/22 1128 11/22/22 1128 11/22/22 1128 --   Oral Monitor Lying Right arm       Pain Score       11/22/22 1128       No Pain           Vitals:    11/22/22 1128 11/22/22 1248 11/22/22 1324 11/22/22 1345   BP: (!) 193/91 (!) 172/85 152/89 157/80   Pulse: 61 61 66 72   Patient Position - Orthostatic VS: Lying  Lying          Visual Acuity      ED Medications  Medications   potassium chloride (K-DUR,KLOR-CON) CR tablet 40 mEq (has no administration in time range)   potassium chloride 20 mEq IVPB (premix) (has no administration in time range)   dextrose 50 % IV solution 25 g (25 g Intravenous Given 11/22/22 1328)       Diagnostic Studies  Results Reviewed     Procedure Component Value Units Date/Time    Ethanol [[id number]]  (Normal) Collected: 11/22/22 1319    Lab Status: Final result Specimen: Blood from Arm, Left Updated: 11/22/22 1441     Ethanol Lvl <3 mg/dL     Magnesium [[id number]]     Lab Status: No result Specimen: Blood     Basic metabolic panel [[id number]]  (Abnormal) Collected: 11/22/22 1311    Lab Status: Final result Specimen: Blood from Hand, Left Updated: 11/22/22 1429     Sodium 142 mmol/L      Potassium 2 6 mmol/L      Chloride 103 mmol/L      CO2 32 mmol/L      ANION GAP 7 mmol/L      BUN 8 mg/dL      Creatinine 0 84 mg/dL      Glucose 52 mg/dL      Calcium 8 5 mg/dL      eGFR 100 ml/min/1 73sq m     Narrative:      [place] guidelines for Chronic Kidney Disease (CKD):   ·  Stage 1 with normal or high GFR (GFR > 90 mL/min/1 73 square meters)  ·  Stage 2 Mild CKD (GFR = 60-89 mL/min/1 73 square meters)  ·  Stage 3A Moderate CKD (GFR = 45-59 mL/min/1 73 square meters)  ·  Stage 3B Moderate CKD (GFR = 30-44 mL/min/1 73 square meters)  ·  Stage 4 Severe CKD (GFR = 15-29 mL/min/1 73 square meters)  ·  Stage 5 End Stage CKD (GFR <15 mL/min/1 73 square meters)  Note: GFR calculation is accurate only with a steady state creatinine    Fingerstick Glucose (POCT) [[id number]]  (Normal) Collected: 11/22/22 1424    Lab Status: Final result Updated: 11/22/22 1425     POC Glucose 105 mg/dl     Procalcitonin [[id number]]  (Normal) Collected: 11/22/22 1302    Lab Status: Final result Specimen: Blood from Arm, Right Updated: 11/22/22 1423     Procalcitonin <0 05 ng/ml     HS Troponin I 2hr [[id number]]     Lab Status: No result Specimen: Blood     HS Troponin 0hr (reflex protocol) [[id number]]  (Normal) Collected: 11/22/22 1313    Lab Status: Final result Specimen: Blood from Hand, Left Updated: 11/22/22 1420     hs TnI 0hr 26 ng/L     Protime-INR [[id number]]  (Normal) Collected: 11/22/22 1302    Lab Status: Final result Specimen: Blood from Arm, Right Updated: 11/22/22 1416     Protime 12 2 seconds      INR 0 90    APTT [[id number]]  (Abnormal) Collected: 11/22/22 1302    Lab Status: Final result Specimen: Blood from Arm, Right Updated: 11/22/22 1416     PTT 22 seconds     Lactic acid [[id number]]  (Normal) Collected: 11/22/22 1319    Lab Status: Final result Specimen: Blood from Hand, Left Updated: 11/22/22 1411     LACTIC ACID 1 8 mmol/L     Narrative:      Result may be elevated if tourniquet was used during collection  Fingerstick Glucose (POCT) [[id number]]  (Abnormal) Collected: 11/22/22 1348    Lab Status: Final result Updated: 11/22/22 1352     POC Glucose 161 mg/dl     CBC and differential [[id number]]  (Abnormal) Collected: 11/22/22 1313    Lab Status: Final result Specimen: Blood from Hand, Left Updated: 11/22/22 1337     WBC 13 95 Thousand/uL      RBC 3 94 Million/uL      Hemoglobin 12 6 g/dL      Hematocrit 35 4 %      MCV 90 fL      MCH 32 0 pg      MCHC 35 6 g/dL      RDW 12 5 %      MPV 10 1 fL      Platelets 474 Thousands/uL      nRBC 0 /100 WBCs      Neutrophils Relative 78 %      Immat GRANS % 0 %      Lymphocytes Relative 14 %      Monocytes Relative 7 %      Eosinophils Relative 1 %      Basophils Relative 0 %      Neutrophils Absolute 10 78 Thousands/µL      Immature Grans Absolute 0 06 Thousand/uL      Lymphocytes Absolute 1 93 Thousands/µL      Monocytes Absolute 0 96 Thousand/µL      Eosinophils Absolute 0 18 Thousand/µL      Basophils Absolute 0 04 Thousands/µL     Blood culture #1 [[id number]] Collected: 11/22/22 1302    Lab Status: In process Specimen: Blood from Arm, Right Updated: 11/22/22 1334    Blood culture #2 [[id number]] Collected: 11/22/22 1311    Lab Status:  In process Specimen: Blood from Hand, Left Updated: 11/22/22 1334    Fingerstick Glucose (POCT) [[id number]]  (Abnormal) Collected: 11/22/22 1326    Lab Status: Final result Updated: 11/22/22 1327     POC Glucose 42 mg/dl     UA w Reflex to Microscopic w Reflex to Culture [[id number]]     Lab Status: No result Specimen: Urine     Fingerstick Glucose (POCT) [[id number]]  (Abnormal) Collected: 11/22/22 1142    Lab Status: Final result Updated: 11/22/22 1143     POC Glucose 142 mg/dl     Fingerstick Glucose (POCT) [[id number]]  (Abnormal) Collected: 11/22/22 1126    Lab Status: Final result Updated: 11/22/22 1142     POC Glucose 47 mg/dl                  XR chest portable - 1 view    (Results Pending)              Procedures  Procedures         ED Course                                             MDM  Number of Diagnoses or Management Options  Hypoglycemia: new and requires workup  Hypothermia, initial encounter: new and requires workup  Diagnosis management comments: Montez Barragan is a 46 y o  male with past medical history of type 2 diabetes mellitus, paroxysmal AFib, and alcohol abuse is presenting to the ED via EMS   for hypoglycemia and hypothermia  Patient's blood glucose was 42 upon arrival and temp was 93  He was given D10 by EMS and blood sugar increased to 147  Patient initially appears lethargic and disoriented however after receiving the time quite yet but he was alert and oriented x3  There does not appear any obvious source of infection on exam  Patient was hypothermic at 93 upon arrival by temperature increased to 97 4 with use of Shannon Hugger and blankets  Repeat blood sugar reveals glucose of 42  Patient started on 25 of D50  Ordered lab work and chest XR    BMP reveals potassium of 2 6  Ordered 40 IV and 40 PO of potassium  Discussed case with Dr Trey Mora who agrees to admit patient for observation for hypoglycemia and hypokalemia          Amount and/or Complexity of Data Reviewed  Clinical lab tests: ordered and reviewed  Tests in the radiology section of CPT®: ordered and reviewed  Independent visualization of images, tracings, or specimens: yes    Patient Progress  Patient progress: stable      Disposition  Final diagnoses:   None     ED Disposition     None      Follow-up Information    None         Patient's Medications   Discharge Prescriptions    No medications on file       No discharge procedures on file      PDMP Review       Value Time User    PDMP Reviewed  Yes 8/18/2020 11:39 AM Kathy Morrow MD          ED Provider  Electronically Signed by           Nithya Garcia PA-C  11/22/22 3999

## 2022-11-22 NOTE — ASSESSMENT & PLAN NOTE
Patient has a well-controlled hemoglobin A1c with multiple episodes of hypoglycemia  Previous admissions for hypoglycemia have noted that patient has been reluctant to start oral diabetic medications and wishes to continue to use Lantus and only wants to inject once a day  Recommend monitoring off insulin at this time and likely on discharge  Will start Accu-Cheks and sliding scale insulin

## 2022-11-23 LAB
ANION GAP SERPL CALCULATED.3IONS-SCNC: 7 MMOL/L (ref 4–13)
ATRIAL RATE: 86 BPM
BUN SERPL-MCNC: 8 MG/DL (ref 5–25)
CALCIUM SERPL-MCNC: 8 MG/DL (ref 8.3–10.1)
CHLORIDE SERPL-SCNC: 104 MMOL/L (ref 96–108)
CO2 SERPL-SCNC: 33 MMOL/L (ref 21–32)
CREAT SERPL-MCNC: 0.84 MG/DL (ref 0.6–1.3)
ERYTHROCYTE [DISTWIDTH] IN BLOOD BY AUTOMATED COUNT: 12.8 % (ref 11.6–15.1)
GFR SERPL CREATININE-BSD FRML MDRD: 100 ML/MIN/1.73SQ M
GLUCOSE SERPL-MCNC: 120 MG/DL (ref 65–140)
GLUCOSE SERPL-MCNC: 135 MG/DL (ref 65–140)
GLUCOSE SERPL-MCNC: 169 MG/DL (ref 65–140)
GLUCOSE SERPL-MCNC: 203 MG/DL (ref 65–140)
GLUCOSE SERPL-MCNC: 207 MG/DL (ref 65–140)
GLUCOSE SERPL-MCNC: 38 MG/DL (ref 65–140)
GLUCOSE SERPL-MCNC: 41 MG/DL (ref 65–140)
HCT VFR BLD AUTO: 33.2 % (ref 36.5–49.3)
HGB BLD-MCNC: 11.7 G/DL (ref 12–17)
MCH RBC QN AUTO: 31.8 PG (ref 26.8–34.3)
MCHC RBC AUTO-ENTMCNC: 35.2 G/DL (ref 31.4–37.4)
MCV RBC AUTO: 90 FL (ref 82–98)
P AXIS: 61 DEGREES
PLATELET # BLD AUTO: 260 THOUSANDS/UL (ref 149–390)
PMV BLD AUTO: 10.5 FL (ref 8.9–12.7)
POTASSIUM SERPL-SCNC: 3 MMOL/L (ref 3.5–5.3)
POTASSIUM SERPL-SCNC: 4.6 MMOL/L (ref 3.5–5.3)
PR INTERVAL: 138 MS
PROCALCITONIN SERPL-MCNC: 0.05 NG/ML
QRS AXIS: 45 DEGREES
QRSD INTERVAL: 84 MS
QT INTERVAL: 392 MS
QTC INTERVAL: 469 MS
RBC # BLD AUTO: 3.68 MILLION/UL (ref 3.88–5.62)
SODIUM SERPL-SCNC: 144 MMOL/L (ref 135–147)
T WAVE AXIS: 5 DEGREES
VENTRICULAR RATE: 86 BPM
WBC # BLD AUTO: 11.1 THOUSAND/UL (ref 4.31–10.16)

## 2022-11-23 RX ORDER — POTASSIUM CHLORIDE 14.9 MG/ML
20 INJECTION INTRAVENOUS
Status: DISCONTINUED | OUTPATIENT
Start: 2022-11-23 | End: 2022-11-23

## 2022-11-23 RX ORDER — LISINOPRIL 20 MG/1
20 TABLET ORAL ONCE
Status: COMPLETED | OUTPATIENT
Start: 2022-11-23 | End: 2022-11-23

## 2022-11-23 RX ORDER — POTASSIUM CHLORIDE 20 MEQ/1
40 TABLET, EXTENDED RELEASE ORAL
Status: COMPLETED | OUTPATIENT
Start: 2022-11-23 | End: 2022-11-23

## 2022-11-23 RX ORDER — LISINOPRIL 20 MG/1
40 TABLET ORAL DAILY
Status: DISCONTINUED | OUTPATIENT
Start: 2022-11-24 | End: 2022-11-24 | Stop reason: HOSPADM

## 2022-11-23 RX ORDER — POTASSIUM CHLORIDE 20 MEQ/1
40 TABLET, EXTENDED RELEASE ORAL
Status: DISPENSED | OUTPATIENT
Start: 2022-11-23 | End: 2022-11-23

## 2022-11-23 RX ADMIN — HEPARIN SODIUM 5000 UNITS: 5000 INJECTION INTRAVENOUS; SUBCUTANEOUS at 22:51

## 2022-11-23 RX ADMIN — CEFTRIAXONE 1000 MG: 1 INJECTION, POWDER, FOR SOLUTION INTRAMUSCULAR; INTRAVENOUS at 17:37

## 2022-11-23 RX ADMIN — POTASSIUM CHLORIDE 20 MEQ: 14.9 INJECTION, SOLUTION INTRAVENOUS at 13:09

## 2022-11-23 RX ADMIN — POTASSIUM CHLORIDE 40 MEQ: 1500 TABLET, EXTENDED RELEASE ORAL at 15:00

## 2022-11-23 RX ADMIN — HEPARIN SODIUM 5000 UNITS: 5000 INJECTION INTRAVENOUS; SUBCUTANEOUS at 13:08

## 2022-11-23 RX ADMIN — HEPARIN SODIUM 5000 UNITS: 5000 INJECTION INTRAVENOUS; SUBCUTANEOUS at 06:26

## 2022-11-23 RX ADMIN — POTASSIUM CHLORIDE 40 MEQ: 1500 TABLET, EXTENDED RELEASE ORAL at 13:04

## 2022-11-23 RX ADMIN — HEPARIN SODIUM 5000 UNITS: 5000 INJECTION INTRAVENOUS; SUBCUTANEOUS at 00:40

## 2022-11-23 RX ADMIN — LISINOPRIL 20 MG: 20 TABLET ORAL at 08:47

## 2022-11-23 RX ADMIN — HYDROCHLOROTHIAZIDE 12.5 MG: 12.5 TABLET ORAL at 08:47

## 2022-11-23 RX ADMIN — INSULIN LISPRO 1 UNITS: 100 INJECTION, SOLUTION INTRAVENOUS; SUBCUTANEOUS at 22:51

## 2022-11-23 RX ADMIN — LISINOPRIL 20 MG: 20 TABLET ORAL at 13:05

## 2022-11-23 RX ADMIN — POTASSIUM CHLORIDE 40 MEQ: 1500 TABLET, EXTENDED RELEASE ORAL at 15:40

## 2022-11-23 RX ADMIN — INSULIN LISPRO 1 UNITS: 100 INJECTION, SOLUTION INTRAVENOUS; SUBCUTANEOUS at 17:00

## 2022-11-23 RX ADMIN — Medication 12.5 MG: at 08:47

## 2022-11-23 RX ADMIN — Medication 12.5 MG: at 22:52

## 2022-11-23 NOTE — PLAN OF CARE
Problem: PAIN - ADULT  Goal: Verbalizes/displays adequate comfort level or baseline comfort level  Description: Interventions:  - Encourage patient to monitor pain and request assistance  - Assess pain using appropriate pain scale  - Administer analgesics based on type and severity of pain and evaluate response  - Implement non-pharmacological measures as appropriate and evaluate response  - Consider cultural and social influences on pain and pain management  - Notify physician/advanced practitioner if interventions unsuccessful or patient reports new pain  Outcome: Progressing     Problem: SAFETY ADULT  Goal: Patient will remain free of falls  Description: INTERVENTIONS:  - Educate patient/family on patient safety including physical limitations  - Instruct patient to call for assistance with activity   - Consult OT/PT to assist with strengthening/mobility   - Keep Call bell within reach  - Keep bed low and locked with side rails adjusted as appropriate  - Keep care items and personal belongings within reach  - Initiate and maintain comfort rounds  - Make Fall Risk Sign visible to staff  - Obtain necessary fall risk management equipment: bed rails  - Apply yellow socks and bracelet for high fall risk patients  - Consider moving patient to room near nurses station  Outcome: Progressing  Goal: Maintain or return to baseline ADL function  Description: INTERVENTIONS:  -  Assess patient's ability to carry out ADLs; assess patient's baseline for ADL function and identify physical deficits which impact ability to perform ADLs (bathing, care of mouth/teeth, toileting, grooming, dressing, etc )  - Assess/evaluate cause of self-care deficits   - Assess range of motion  - Assess patient's mobility; develop plan if impaired  - Assess patient's need for assistive devices and provide as appropriate  - Encourage maximum independence but intervene and supervise when necessary  - Involve family in performance of ADLs  - Assess for home care needs following discharge   - Consider OT consult to assist with ADL evaluation and planning for discharge  - Provide patient education as appropriate  Outcome: Progressing     Problem: METABOLIC, FLUID AND ELECTROLYTES - ADULT  Goal: Electrolytes maintained within normal limits  Description: INTERVENTIONS:  - Monitor labs and assess patient for signs and symptoms of electrolyte imbalances  - Administer electrolyte replacement as ordered  - Monitor response to electrolyte replacements, including repeat lab results as appropriate  - Instruct patient on fluid and nutrition as appropriate  Outcome: Progressing  Goal: Fluid balance maintained  Description: INTERVENTIONS:  - Monitor labs   - Monitor I/O and WT  - Instruct patient on fluid and nutrition as appropriate  - Assess for signs & symptoms of volume excess or deficit  Outcome: Progressing  Goal: Glucose maintained within target range  Description: INTERVENTIONS:  - Monitor Blood Glucose as ordered  - Assess for signs and symptoms of hyperglycemia and hypoglycemia  - Administer ordered medications to maintain glucose within target range  - Assess nutritional intake and initiate nutrition service referral as needed  Outcome: Progressing

## 2022-11-23 NOTE — NURSING NOTE
Pt BG dropped before bed to <35 but was restored with OJ, crackers and a sandwich  This AM BG was <38  Gave 2 oj and crackers  Will recheck in 15 minutes  Pt does not appear to be symptomatic at this time

## 2022-11-23 NOTE — PROGRESS NOTES
[facility]  Progress Note - [address] 1970, 46 y o  male MRN: [id number]  Unit/Bed#: [facility] 68 2 -01 Encounter: [id number]  Primary Care Provider: No primary care provider on file  Date and time admitted to hospital: 11/22/2022 11:21 AM    Hypothermia  Assessment & Plan  Likely related to hypoglycemia  Warmed in emergency department  Resolved    Leukocytosis  Assessment & Plan  Likely reactive  Chest x-ray showed possible organizing consolidation  Was started on Rocephin, however procalcitonin x1 was negative  Consider discharging today on Z-Henrry    Hypokalemia  Assessment & Plan  Likely related to the poor oral intake  Repleted today, repeat labs at 5:00 p m  Trend BMP    Hypoglycemia  Assessment & Plan  Patient has a well-controlled hemoglobin A1c with multiple episodes of hypoglycemia  Previous admissions for hypoglycemia have noted that patient has been reluctant to start oral diabetic medications and wishes to continue to use Lantus and only wants to inject once a day  Recommend monitoring off insulin at this time and on discharge  Continue Accu-Cheks and sliding scale insulin    CKD (chronic kidney disease), stage III [facility])  Assessment & Plan  Lab Results   Component Value Date    EGFR 100 11/23/2022    EGFR 100 11/22/2022    EGFR 77 09/03/2022    CREATININE 0 84 11/23/2022    CREATININE 0 84 11/22/2022    CREATININE 1 09 09/03/2022   Renal function at baseline    Paroxysmal A-fib [facility])  Assessment & Plan  Not currently maintained on anticoagulation    Prior notes state due to noncompliance and alcohol abuse  Continue metoprolol    Uncomplicated alcohol dependence ([facility] [place] [address] )  Assessment & Plan  [facility] protocol    Essential hypertension  Assessment & Plan  Elevated in emergency department  Resume home medications    * Acute encephalopathy  Assessment & Plan  Patient presents with lethargy related to hypoglycemia  Resolved with IV dextrose  Alert oriented x3          VTE Pharmacologic Prophylaxis:   Pharmacologic: Heparin  Mechanical VTE Prophylaxis in Place: Yes    Patient Centered Rounds: I have performed bedside rounds with nursing staff today  Discussions with Specialists or Other Care Team Provider:     Education and Discussions with Family / Patient: Discussed treatment plan with family and patient who agree with current plan; encouraged to ask questions and participate  Time Spent for Care: 45 minutes  More than 50% of total time spent on counseling and coordination of care as described above  Current Length of Stay: 0 day(s)    Current Patient Status: Observation   Certification Statement: The patient will continue to require additional inpatient hospital stay due to Treatment of acute encephalopathy and other metabolic disturbances    Discharge Plan: To be determined, likely tomorrow    Code Status: Level 1 - Full Code      Subjective:   Patient seen examined bedside, no acute distress but does appear somewhat confused  Information obtained through , but patient required questions being repeated multiple times and had delayed answer to simple questions such as would assure name and where are you currently located despite being in his native [place] tongue  Potassium still low and attempted IV and oral potassium repletion, patient could not tolerate IV potassium and that was held  Will recheck potassium labs this afternoon  Glucose levels improved; patient denies taking any oral diabetes medications  Unclear etiology of hypoglycemia;likely secondary to ongoing alcohol use versus oral diabetic medications that he is inadvertently taking versus using short-acting insulin instead of long-acting insulin as intended  At this point, will hold insulin and anticipate holding insulin on discharge  Patient to follow-up with PCP and may consider consult to Endocrinology    Chest x-ray reviewed and revealed consolidation in right lower lobe; however procalcitonin negative today  Will repeat procalcitonin tomorrow and as long as patient does not appear septic and procalcitonin negative X 2; discontinue antibiotics    Objective:     Vitals:   Temp (24hrs), Av 9 °F (36 6 °C), Min:97 7 °F (36 5 °C), Max:98 2 °F (36 8 °C)    Temp:  [97 7 °F (36 5 °C)-98 2 °F (36 8 °C)] 97 7 °F (36 5 °C)  HR:  [62-75] 62  Resp:  [16-18] 17  BP: (140-166)/(75-88) 140/75  SpO2:  [97 %-100 %] 100 %  Body mass index is 26 89 kg/m²  Input and Output Summary (last 24 hours):     No intake or output data in the 24 hours ending 22 170    Physical Exam:     Physical Exam  Vitals and nursing note reviewed  Constitutional:       General: He is not in acute distress  Appearance: He is well-developed  HENT:      Head: Normocephalic and atraumatic  Eyes:      Conjunctiva/sclera: Conjunctivae normal    Cardiovascular:      Rate and Rhythm: Normal rate and regular rhythm  Heart sounds: No murmur heard  Pulmonary:      Effort: Pulmonary effort is normal  No respiratory distress  Breath sounds: Normal breath sounds  Abdominal:      Palpations: Abdomen is soft  Tenderness: There is no abdominal tenderness  Musculoskeletal:         General: No swelling  Cervical back: Neck supple  Skin:     General: Skin is warm and dry  Capillary Refill: Capillary refill takes less than 2 seconds  Neurological:      Mental Status: He is alert        Comments: Alert oriented X 2   Psychiatric:      Comments: Moderately confused           Additional Data:     Labs:    Results from last 7 days   Lab Units 22  0446 22  1313   WBC Thousand/uL 11 10* 13 95*   HEMOGLOBIN g/dL 11 7* 12 6   HEMATOCRIT % 33 2* 35 4*   PLATELETS Thousands/uL 260 280   NEUTROS PCT %  --  78*   LYMPHS PCT %  --  14   MONOS PCT %  --  7   EOS PCT %  --  1     Results from last 7 days   Lab Units 22  0446   SODIUM mmol/L 144   POTASSIUM mmol/L 3 0*   CHLORIDE mmol/L 104 CO2 mmol/L 33*   BUN mg/dL 8   CREATININE mg/dL 0 84   ANION GAP mmol/L 7   CALCIUM mg/dL 8 0*   GLUCOSE RANDOM mg/dL 41*     Results from last 7 days   Lab Units 11/22/22  1302   INR  0 90     Results from last 7 days   Lab Units 11/23/22  1559 11/23/22  1119 11/23/22  0725 11/23/22  0617 11/23/22  0000 11/22/22  2214 11/22/22  2135 11/22/22  2134 11/22/22  1629 11/22/22  1524 11/22/22  1452 11/22/22  1424   POC GLUCOSE mg/dl 203* 169* 120 38* 135 65 35* 34* 211* 156* 96 105         Results from last 7 days   Lab Units 11/23/22  0446 11/22/22  1319 11/22/22  1302   LACTIC ACID mmol/L  --  1 8  --    PROCALCITONIN ng/ml 0 05  --  <0 05           * I Have Reviewed All Lab Data Listed Above  * Additional Pertinent Lab Tests Reviewed: All Labs Within Last 24 Hours Reviewed    Imaging:    Imaging Reports Reviewed Today Include:  Chest x-ray  Imaging Personally Reviewed by Myself Includes:      Recent Cultures (last 7 days):     Results from last 7 days   Lab Units 11/22/22  1311 11/22/22  1302   BLOOD CULTURE  Received in Microbiology Lab  Culture in Progress  Received in Microbiology Lab  Culture in Progress         Last 24 Hours Medication List:   Current Facility-Administered Medications   Medication Dose Route Frequency Provider Last Rate   • acetaminophen  650 mg Oral Q6H PRN Nahomy Dance, DO     • aluminum-magnesium hydroxide-simethicone  30 mL Oral Q6H PRN Nahomy Dance, DO     • cefTRIAXone  1,000 mg Intravenous Q24H Nahomy Dance, DO 1,000 mg (11/22/22 2019)   • heparin (porcine)  5,000 Units Subcutaneous [facility] Nahomy Dance, DO     • hydrochlorothiazide  12 5 mg Oral Daily Nahomy Dance, DO     • insulin lispro  1-5 Units Subcutaneous 4x Daily (AC & HS) Nahomy Dance, DO     • [START ON 11/24/2022] lisinopril  40 mg Oral Daily Rosa Lockwood MD     • metoprolol tartrate  12 5 mg Oral Q12H [address], DO     • ondansetron  4 mg Intravenous Q6H PRN Marlis Dance, DO • potassium chloride  40 mEq Oral Q2H Joanne Greer MD          Today, Patient Was Seen By: Tesfaye Locke MD    ** Please Note: Dictation voice to text software may have been used in the creation of this document   **

## 2022-11-23 NOTE — ASSESSMENT & PLAN NOTE
Likely reactive  Chest x-ray showed possible organizing consolidation  Was started on Rocephin, however procalcitonin x1 was negative  Consider discharging today on Z-Henrry

## 2022-11-23 NOTE — ASSESSMENT & PLAN NOTE
Patient has a well-controlled hemoglobin A1c with multiple episodes of hypoglycemia  Previous admissions for hypoglycemia have noted that patient has been reluctant to start oral diabetic medications and wishes to continue to use Lantus and only wants to inject once a day  Recommend monitoring off insulin at this time and on discharge  Continue Accu-Cheks and sliding scale insulin

## 2022-11-23 NOTE — PLAN OF CARE
Problem: PAIN - ADULT  Goal: Verbalizes/displays adequate comfort level or baseline comfort level  Description: Interventions:  - Encourage patient to monitor pain and request assistance  - Assess pain using appropriate pain scale  - Administer analgesics based on type and severity of pain and evaluate response  - Implement non-pharmacological measures as appropriate and evaluate response  - Consider cultural and social influences on pain and pain management  - Notify physician/advanced practitioner if interventions unsuccessful or patient reports new pain  Outcome: Progressing     Problem: SAFETY ADULT  Goal: Patient will remain free of falls  Description: INTERVENTIONS:  - Educate patient/family on patient safety including physical limitations  - Instruct patient to call for assistance with activity   - Consult OT/PT to assist with strengthening/mobility   - Keep Call bell within reach  - Keep bed low and locked with side rails adjusted as appropriate  - Keep care items and personal belongings within reach  - Initiate and maintain comfort rounds  - Make Fall Risk Sign visible to staff  - Offer Toileting every 2 Hours, in advance of need  - Initiate/Maintain bed alarm  - Obtain necessary fall risk management equipment:   - Apply yellow socks and bracelet for high fall risk patients  - Consider moving patient to room near nurses station  Outcome: Progressing  Goal: Maintain or return to baseline ADL function  Description: INTERVENTIONS:  -  Assess patient's ability to carry out ADLs; assess patient's baseline for ADL function and identify physical deficits which impact ability to perform ADLs (bathing, care of mouth/teeth, toileting, grooming, dressing, etc )  - Assess/evaluate cause of self-care deficits   - Assess range of motion  - Assess patient's mobility; develop plan if impaired  - Assess patient's need for assistive devices and provide as appropriate  - Encourage maximum independence but intervene and supervise when necessary  - Involve family in performance of ADLs  - Assess for home care needs following discharge   - Consider OT consult to assist with ADL evaluation and planning for discharge  - Provide patient education as appropriate  Outcome: Progressing     Problem: METABOLIC, FLUID AND ELECTROLYTES - ADULT  Goal: Electrolytes maintained within normal limits  Description: INTERVENTIONS:  - Monitor labs and assess patient for signs and symptoms of electrolyte imbalances  - Administer electrolyte replacement as ordered  - Monitor response to electrolyte replacements, including repeat lab results as appropriate  - Instruct patient on fluid and nutrition as appropriate  Outcome: Progressing  Goal: Fluid balance maintained  Description: INTERVENTIONS:  - Monitor labs   - Monitor I/O and WT  - Instruct patient on fluid and nutrition as appropriate  - Assess for signs & symptoms of volume excess or deficit  Outcome: Progressing  Goal: Glucose maintained within target range  Description: INTERVENTIONS:  - Monitor Blood Glucose as ordered  - Assess for signs and symptoms of hyperglycemia and hypoglycemia  - Administer ordered medications to maintain glucose within target range  - Assess nutritional intake and initiate nutrition service referral as needed  Outcome: Progressing     Problem: Knowledge Deficit  Goal: Patient/family/caregiver demonstrates understanding of disease process, treatment plan, medications, and discharge instructions  Description: Complete learning assessment and assess knowledge base    Interventions:  - Provide teaching at level of understanding  - Provide teaching via preferred learning methods  Outcome: Progressing  Goal: Infant caregiver verbalizes understanding of benefits of skin-to-skin with healthy   Description: Prior to delivery, educate patient regarding skin-to-skin practice and its benefits  Initiate immediate and uninterrupted skin-to-skin contact after birth until breastfeeding is initiated or a minimum of one hour  Encourage continued skin-to-skin contact throughout the post partum stay    Outcome: Progressing  Goal: Infant caregiver verbalizes understanding of benefits and management of breastfeeding their healthy   Description: Help initiate breastfeeding within one hour of birth  Educate/assist with breastfeeding positioning and latch  Educate on safe positioning and to monitor their  for safety  Educate on how to maintain lactation even if they are  from their   Educate/initiate pumping for a mom with a baby in the NICU within 6 hours after birth  Give infants no food or drink other than breast milk unless medically indicated  Educate on feeding cues and encourage breastfeeding on demand    Outcome: Progressing  Goal: Infant caregiver verbalizes understanding of benefits to rooming-in with their healthy   Description: Promote rooming in 23 out of 24 hours per day  Educate on benefits to rooming-in  Provide  care in room with parents as long as infant and mother condition allow    Outcome: Progressing  Goal: Provide formula feeding instructions and preparation information to caregivers who do not wish to breastfeed their   Description: Provide one on one information on frequency, amount, and burping for formula feeding caregivers throughout their stay and at discharge  Provide written information/video on formula preparation  Outcome: Progressing  Goal: Infant caregiver verbalizes understanding of support and resources for follow up after discharge  Description: Provide individual discharge education on when to call the doctor  Provide resources and contact information for post-discharge support      Outcome: Progressing

## 2022-11-23 NOTE — ASSESSMENT & PLAN NOTE
Lab Results   Component Value Date    EGFR 100 11/23/2022    EGFR 100 11/22/2022    EGFR 77 09/03/2022    CREATININE 0 84 11/23/2022    CREATININE 0 84 11/22/2022    CREATININE 1 09 09/03/2022   Renal function at baseline

## 2022-11-23 NOTE — UTILIZATION REVIEW
Initial Clinical Review    Admission: Date/Time/Statement:   Admission Orders (From admission, onward)     Ordered        11/22/22 1535  Place in Observation  Once                      Orders Placed This Encounter   Procedures   • Place in Observation     Standing Status:   Standing     Number of Occurrences:   1     Order Specific Question:   Level of Care     Answer:   Med Surg [16]     ED Arrival Information     Expected   -    Arrival   11/22/2022 11:21    Acuity   Emergent            Means of arrival   Ambulance    Escorted by   [facility] ([address])    Service   Hospitalist    Admission type   Emergency            Arrival complaint   low blood sugar           Chief Complaint   Patient presents with   • Hypoglycemia - Symptomatic     Found by parents altered, history of DM and alcohol abuse, BS 42       Initial Presentation: 46 y o  male with PMH of insulin-dependent type 2 diabetes, paroxysmal atrial fibrillation, CKD stage 3, hypertension who presents with acute encephalopathy and hypoglycemia  EMS was called by patient's parents due to lethargy  Reportedly this is the 4th time this week the EMS has been called to evaluate patient's hypoglycemia  Patient was found to have a blood sugar 40 and hypothermic at 91  Patient reports he takes 25 units a day q h s  and decreases it to 10 units if he does eat much that day  Plan: Observation for acute encephalopathy, hypothermia, hypokalemia, hypoglycemia, alcohol dependence, HTN: encephalopathy resolved with IV dextrose, warmed in ED, given 80 mEq potassium in ED- trend BMP, accu checks and SSI, continue metoprolol, CIWA protocol         ED Triage Vitals   Temperature Pulse Respirations Blood Pressure SpO2   11/22/22 1128 11/22/22 1128 11/22/22 1128 11/22/22 1128 11/22/22 1248   (!) 93 °F (33 9 °C) 61 20 (!) 193/91 100 %      Temp Source Heart Rate Source Patient Position - Orthostatic VS BP Location FiO2 (%)   11/22/22 1128 11/22/22 1128 11/22/22 1128 11/22/22 1128 --   Oral Monitor Lying Right arm       Pain Score       11/22/22 1128       No Pain          Wt Readings from Last 1 Encounters:   11/22/22 73 3 kg (161 lb 9 6 oz)     Additional Vital Signs:     Date/Time Temp Pulse Resp BP MAP (mmHg) SpO2 O2 Device   11/23/22 07:27:46 98 1 °F (36 7 °C) 69 16 166/86 113 98 % --   11/23/22 02:34:58 98 2 °F (36 8 °C) 67 -- 161/84 110 97 % --   11/22/22 21:37:34 97 7 °F (36 5 °C) 75 18 164/88 113 99 % None (Room air)   11/22/22 17:00:21 98 4 °F (36 9 °C) 74 16 156/86 109 98 % None (Room air)   11/22/22 1644 -- 76 16 156/95 -- 99 % None (Room air)   11/22/22 1635 98 4 °F (36 9 °C) -- -- -- -- -- --   11/22/22 1545 -- 78 21 127/61 88 99 % --   11/22/22 1500 -- 70 16 176/80 Abnormal  109 100 % None (Room air)   11/22/22 1345 -- 72 22 157/80 106 99 % --   11/22/22 1324 97 4 °F (36 3 °C) Abnormal  66 18 152/89 -- -- --   11/22/22 1248 94 5 °F (34 7 °C) Abnormal  61 18 172/85 Abnormal  -- 100 % --   11/22/22 1219 -- -- -- -- -- -- None (Room air)     CIWA-Ar Score    Row Name 11/23/22 07:27:46 11/23/22 02:34:58 11/22/22 21:37:34 11/22/22 17:00:21 11/22/22 1644   CIWA-Ar   /86  -/84  -/88  -/86  -/95  -AN   Pulse 69  -DI 67  -DI 75  -DI 74  -DI 76  -AN   Nausea and Vomiting -- 0  -TP 0  -TP 0  -KD --   Tactile Disturbances -- 0  -TP 0  -TP 0  -KD --   Tremor -- 0  -TP 0  -TP 0  -KD --   Auditory Disturbances -- 0  -TP 0  -TP 0  -KD --   Paroxysmal Sweats -- 0  -TP 0  -TP 0  -KD --   Visual Disturbances -- 0  -TP 0  -TP 0  -KD --   Anxiety -- 0  -TP 1  -TP 0  -KD --   Headache, Fullness in Head -- 0  -TP 0  -TP 0  -KD --   Agitation -- 0  -TP 1  -TP 0  -KD --   Orientation and Clouding of Sensorium -- 0  -TP 0  -TP 0  -KD --   CIWA-Ar Total -- 0  -TP 2  -TP 0  -KD --   Row Name 11/22/22 1545 11/22/22 1500 11/22/22 1345 11/22/22 1324 11/22/22 1248   CIWA-Ar   /61  -/80 Abnormal   -/80  -/89  -/85 Abnormal   -BB   Pulse 78  -LW 70  -BB 72  -LW 66  -LW 61  -BB   Row Name 11/22/22 1128       CIWA-Ar   /91 Abnormal   -LW       Pulse 61  -LW           Pertinent Labs/Diagnostic Test Results:   XR chest portable - 1 view   ED Interpretation by Jenniffer Severe, PA-C (11/22 1556)   No obvious acute cardiopulmonary abnormalities when read by me      Final Result by Maximus Willis MD (11/22 1639)      1  Right lower lobe opacification, atelectasis versus developing consolidation                    Workstation performed: [id number]           11/22 EKG:  Sinus bradycardia  Prolonged QT  Abnormal ECG      Results from last 7 days   Lab Units 11/23/22  0446 11/22/22  1313   WBC Thousand/uL 11 10* 13 95*   HEMOGLOBIN g/dL 11 7* 12 6   HEMATOCRIT % 33 2* 35 4*   PLATELETS Thousands/uL 260 280   NEUTROS ABS Thousands/µL  --  10 78*         Results from last 7 days   Lab Units 11/23/22  0446 11/22/22  1539 11/22/22  1311   SODIUM mmol/L 144  --  142   POTASSIUM mmol/L 3 0*  --  2 6*   CHLORIDE mmol/L 104  --  103   CO2 mmol/L 33*  --  32   ANION GAP mmol/L 7  --  7   BUN mg/dL 8  --  8   CREATININE mg/dL 0 84  --  0 84   EGFR ml/min/1 73sq m 100  --  100   CALCIUM mg/dL 8 0*  --  8 5   MAGNESIUM mg/dL  --  1 8  --          Results from last 7 days   Lab Units 11/23/22  0725 11/23/22  0617 11/23/22  0000 11/22/22  2214 11/22/22  2135 11/22/22  2134 11/22/22  1629 11/22/22  1524 11/22/22  1452 11/22/22  1424 11/22/22  1348 11/22/22  1326   POC GLUCOSE mg/dl 120 38* 135 65 35* 34* 211* 156* 96 105 161* 42*     Results from last 7 days   Lab Units 11/23/22  0446 11/22/22  1311   GLUCOSE RANDOM mg/dL 41* 52*             BETA-HYDROXYBUTYRATE   Date Value Ref Range Status   02/02/2019 0 16 0 02 - 0 27 mmol/L Final          Results from last 7 days   Lab Units 11/22/22  1810 11/22/22  1539 11/22/22  1313   HS TNI 0HR ng/L  --   --  26   HS TNI 2HR ng/L  --  24  --    HSTNI D2 ng/L  --  -2  --    HS TNI 4HR ng/L 21  --   --    HSTNI D4 ng/L -5  --   --          Results from last 7 days   Lab Units 11/22/22  1302   PROTIME seconds 12 2   INR  0 90   PTT seconds 22*         Results from last 7 days   Lab Units 11/23/22  0446 11/22/22  1302   PROCALCITONIN ng/ml 0 05 <0 05     Results from last 7 days   Lab Units 11/22/22  1319   LACTIC ACID mmol/L 1 8         Results from last 7 days   Lab Units 11/22/22  1818   CLARITY UA  Clear   COLOR UA  Yellow   SPEC GRAV UA  1 020   PH UA  7 5   GLUCOSE UA mg/dl 100 (1/10%)*   KETONES UA mg/dl Negative   BLOOD UA  Negative   PROTEIN UA mg/dl 30 (1+)*   NITRITE UA  Negative   BILIRUBIN UA  Negative   UROBILINOGEN UA E U /dl 1 0   LEUKOCYTES UA  Negative   WBC UA /hpf None Seen   RBC UA /hpf 0-1*   BACTERIA UA /hpf None Seen   EPITHELIAL CELLS WET PREP /hpf Occasional                 Results from last 7 days   Lab Units 11/22/22  1319   ETHANOL LVL mg/dL <3                 Results from last 7 days   Lab Units 11/22/22  1311 11/22/22  1302   BLOOD CULTURE  Received in Microbiology Lab  Culture in Progress  Received in Microbiology Lab  Culture in Progress           ED Treatment:   Medication Administration from 11/22/2022 1120 to 11/22/2022 1652       Date/Time Order Dose Route Action     11/22/2022 1328 EST dextrose 50 % IV solution 25 g 25 g Intravenous Given     11/22/2022 1501 EST potassium chloride (K-DUR,KLOR-CON) CR tablet 40 mEq 40 mEq Oral Given     11/22/2022 1500 EST potassium chloride 20 mEq IVPB (premix) 20 mEq Intravenous New Bag        Past Medical History:   Diagnosis Date   • Alcohol abuse    • Chronic pancreatitis ([facility] [address] )    • Diabetes mellitus (HCC)     Type 2   • Pancreatitis    • Paroxysmal A-fib (HCC)    • Thrombocytopenia (HCC)      Present on Admission:  • Acute encephalopathy  • Hypoglycemia  • Essential hypertension  • Uncomplicated alcohol dependence (HCC)  • Paroxysmal A-fib (HCC)  • CKD (chronic kidney disease), stage III (HCC)  • Hypokalemia      Admitting Diagnosis: Hypoglycemia [E16 2]  Hypothermia, initial encounter [T68  XXXA]  Age/Sex: 46 y o  male  Admission Orders:  Scheduled Medications:  cefTRIAXone, 1,000 mg, Intravenous, Q24H  heparin (porcine), 5,000 Units, Subcutaneous, Q8H [facility]  hydrochlorothiazide, 12 5 mg, Oral, Daily  insulin lispro, 1-5 Units, Subcutaneous, 4x Daily (AC & HS)  lisinopril, 20 mg, Oral, Daily  metoprolol tartrate, 12 5 mg, Oral, Q12H JAISON      Continuous IV Infusions:     PRN Meds:  acetaminophen, 650 mg, Oral, Q6H PRN  aluminum-magnesium hydroxide-simethicone, 30 mL, Oral, Q6H PRN  ondansetron, 4 mg, Intravenous, Q6H PRN        None    Network Utilization Review Department  ATTENTION: Please call with any questions or concerns to [phone] and carefully listen to the prompts so that you are directed to the right person  All voicemails are confidential   Jacquelin Cuevas all requests for admission clinical reviews, approved or denied determinations and any other requests to dedicated fax number below belonging to the campus where the patient is receiving treatment   List of dedicated fax numbers for the Facilities:  [address] DENIALS (Administrative/Medical Necessity) [phone]   [address] (Maternity/NICU/Pediatrics) [phone]   9 Jayde Lynn [phone]   [facility] 77 [phone]   [address] Dannie Han 28 [phone]   [facility]0 [facility] 134 815 [facility] [phone]

## 2022-11-24 VITALS
SYSTOLIC BLOOD PRESSURE: 142 MMHG | BODY MASS INDEX: 26.89 KG/M2 | RESPIRATION RATE: 16 BRPM | DIASTOLIC BLOOD PRESSURE: 75 MMHG | HEART RATE: 67 BPM | OXYGEN SATURATION: 97 % | TEMPERATURE: 98.5 F | WEIGHT: 161.6 LBS

## 2022-11-24 LAB
ALBUMIN SERPL BCP-MCNC: 2.9 G/DL (ref 3.5–5)
ALP SERPL-CCNC: 65 U/L (ref 46–116)
ALT SERPL W P-5'-P-CCNC: 35 U/L (ref 12–78)
ANION GAP SERPL CALCULATED.3IONS-SCNC: 7 MMOL/L (ref 4–13)
AST SERPL W P-5'-P-CCNC: 25 U/L (ref 5–45)
BASOPHILS # BLD AUTO: 0.04 THOUSANDS/ÂΜL (ref 0–0.1)
BASOPHILS NFR BLD AUTO: 0 % (ref 0–1)
BILIRUB SERPL-MCNC: 0.67 MG/DL (ref 0.2–1)
BUN SERPL-MCNC: 11 MG/DL (ref 5–25)
CALCIUM ALBUM COR SERPL-MCNC: 9.1 MG/DL (ref 8.3–10.1)
CALCIUM SERPL-MCNC: 8.2 MG/DL (ref 8.3–10.1)
CHLORIDE SERPL-SCNC: 103 MMOL/L (ref 96–108)
CO2 SERPL-SCNC: 27 MMOL/L (ref 21–32)
CREAT SERPL-MCNC: 1.08 MG/DL (ref 0.6–1.3)
EOSINOPHIL # BLD AUTO: 0.51 THOUSAND/ÂΜL (ref 0–0.61)
EOSINOPHIL NFR BLD AUTO: 5 % (ref 0–6)
ERYTHROCYTE [DISTWIDTH] IN BLOOD BY AUTOMATED COUNT: 12.7 % (ref 11.6–15.1)
GFR SERPL CREATININE-BSD FRML MDRD: 78 ML/MIN/1.73SQ M
GLUCOSE P FAST SERPL-MCNC: 265 MG/DL (ref 65–99)
GLUCOSE SERPL-MCNC: 265 MG/DL (ref 65–140)
GLUCOSE SERPL-MCNC: 280 MG/DL (ref 65–140)
GLUCOSE SERPL-MCNC: 306 MG/DL (ref 65–140)
HCT VFR BLD AUTO: 33.8 % (ref 36.5–49.3)
HGB BLD-MCNC: 11.6 G/DL (ref 12–17)
IMM GRANULOCYTES # BLD AUTO: 0.06 THOUSAND/UL (ref 0–0.2)
IMM GRANULOCYTES NFR BLD AUTO: 1 % (ref 0–2)
LYMPHOCYTES # BLD AUTO: 2.9 THOUSANDS/ÂΜL (ref 0.6–4.47)
LYMPHOCYTES NFR BLD AUTO: 28 % (ref 14–44)
MAGNESIUM SERPL-MCNC: 1.8 MG/DL (ref 1.6–2.6)
MCH RBC QN AUTO: 31.1 PG (ref 26.8–34.3)
MCHC RBC AUTO-ENTMCNC: 34.3 G/DL (ref 31.4–37.4)
MCV RBC AUTO: 91 FL (ref 82–98)
MONOCYTES # BLD AUTO: 0.83 THOUSAND/ÂΜL (ref 0.17–1.22)
MONOCYTES NFR BLD AUTO: 8 % (ref 4–12)
NEUTROPHILS # BLD AUTO: 6.06 THOUSANDS/ÂΜL (ref 1.85–7.62)
NEUTS SEG NFR BLD AUTO: 58 % (ref 43–75)
NRBC BLD AUTO-RTO: 0 /100 WBCS
PHOSPHATE SERPL-MCNC: 3.2 MG/DL (ref 2.7–4.5)
PLATELET # BLD AUTO: 252 THOUSANDS/UL (ref 149–390)
PMV BLD AUTO: 10.9 FL (ref 8.9–12.7)
POTASSIUM SERPL-SCNC: 4.1 MMOL/L (ref 3.5–5.3)
PROCALCITONIN SERPL-MCNC: 0.05 NG/ML
PROT SERPL-MCNC: 6.1 G/DL (ref 6.4–8.4)
RBC # BLD AUTO: 3.73 MILLION/UL (ref 3.88–5.62)
SODIUM SERPL-SCNC: 137 MMOL/L (ref 135–147)
WBC # BLD AUTO: 10.4 THOUSAND/UL (ref 4.31–10.16)

## 2022-11-24 RX ORDER — LISINOPRIL AND HYDROCHLOROTHIAZIDE 20; 12.5 MG/1; MG/1
2 TABLET ORAL DAILY
Qty: 60 TABLET | Refills: 0 | Status: SHIPPED | OUTPATIENT
Start: 2022-11-24 | End: 2022-12-24

## 2022-11-24 RX ADMIN — ACETAMINOPHEN 650 MG: 325 TABLET, FILM COATED ORAL at 10:00

## 2022-11-24 RX ADMIN — INSULIN LISPRO 3 UNITS: 100 INJECTION, SOLUTION INTRAVENOUS; SUBCUTANEOUS at 09:59

## 2022-11-24 RX ADMIN — HEPARIN SODIUM 5000 UNITS: 5000 INJECTION INTRAVENOUS; SUBCUTANEOUS at 06:10

## 2022-11-24 RX ADMIN — Medication 12.5 MG: at 09:59

## 2022-11-24 RX ADMIN — LISINOPRIL 40 MG: 20 TABLET ORAL at 10:00

## 2022-11-24 RX ADMIN — HYDROCHLOROTHIAZIDE 12.5 MG: 12.5 TABLET ORAL at 09:59

## 2022-11-24 NOTE — PLAN OF CARE
Problem: PAIN - ADULT  Goal: Verbalizes/displays adequate comfort level or baseline comfort level  Description: Interventions:  - Encourage patient to monitor pain and request assistance  - Assess pain using appropriate pain scale  - Administer analgesics based on type and severity of pain and evaluate response  - Implement non-pharmacological measures as appropriate and evaluate response  - Consider cultural and social influences on pain and pain management  - Notify physician/advanced practitioner if interventions unsuccessful or patient reports new pain  11/23/2022 2100 by Tye Walters RN  Outcome: Progressing  11/23/2022 0829 by Tye Walters RN  Outcome: Progressing  11/23/2022 0828 by Tye Walters RN  Outcome: Progressing     Problem: SAFETY ADULT  Goal: Patient will remain free of falls  Description: INTERVENTIONS:  - Educate patient/family on patient safety including physical limitations  - Instruct patient to call for assistance with activity   - Consult OT/PT to assist with strengthening/mobility   - Keep Call bell within reach  - Keep bed low and locked with side rails adjusted as appropriate  - Keep care items and personal belongings within reach  - Initiate and maintain comfort rounds  - Make Fall Risk Sign visible to staff  - Offer Toileting every 2 Hours, in advance of need  - Initiate/Maintain bed alarm  - Obtain necessary fall risk management equipment:   - Apply yellow socks and bracelet for high fall risk patients  - Consider moving patient to room near nurses station  11/23/2022 2100 by Tye Walters RN  Outcome: Progressing  11/23/2022 0829 by Tye Walters RN  Outcome: Progressing  11/23/2022 0828 by Tye Walters RN  Outcome: Progressing  Goal: Maintain or return to baseline ADL function  Description: INTERVENTIONS:  -  Assess patient's ability to carry out ADLs; assess patient's baseline for ADL function and identify physical deficits which impact ability to perform ADLs (bathing, care of mouth/teeth, toileting, grooming, dressing, etc )  - Assess/evaluate cause of self-care deficits   - Assess range of motion  - Assess patient's mobility; develop plan if impaired  - Assess patient's need for assistive devices and provide as appropriate  - Encourage maximum independence but intervene and supervise when necessary  - Involve family in performance of ADLs  - Assess for home care needs following discharge   - Consider OT consult to assist with ADL evaluation and planning for discharge  - Provide patient education as appropriate  11/23/2022 2100 by Ekta Beal RN  Outcome: Progressing  11/23/2022 0829 by Ekta Beal RN  Outcome: Progressing  11/23/2022 0828 by Ekta Beal RN  Outcome: Progressing     Problem: METABOLIC, FLUID AND ELECTROLYTES - ADULT  Goal: Electrolytes maintained within normal limits  Description: INTERVENTIONS:  - Monitor labs and assess patient for signs and symptoms of electrolyte imbalances  - Administer electrolyte replacement as ordered  - Monitor response to electrolyte replacements, including repeat lab results as appropriate  - Instruct patient on fluid and nutrition as appropriate  11/23/2022 2100 by Ekta Beal RN  Outcome: Progressing  11/23/2022 0829 by Ekta Beal RN  Outcome: Progressing  11/23/2022 0828 by Ekta Beal RN  Outcome: Progressing  Goal: Fluid balance maintained  Description: INTERVENTIONS:  - Monitor labs   - Monitor I/O and WT  - Instruct patient on fluid and nutrition as appropriate  - Assess for signs & symptoms of volume excess or deficit  11/23/2022 2100 by Ekta Beal RN  Outcome: Progressing  11/23/2022 0829 by Ekta Beal RN  Outcome: Progressing  11/23/2022 0828 by Ekta Beal RN  Outcome: Progressing  Goal: Glucose maintained within target range  Description: INTERVENTIONS:  - Monitor Blood Glucose as ordered  - Assess for signs and symptoms of hyperglycemia and hypoglycemia  - Administer ordered medications to maintain glucose within target range  - Assess nutritional intake and initiate nutrition service referral as needed  2022 by Angelica Davis RN  Outcome: Progressing  2022 by Angelica Davis RN  Outcome: Progressing  2022 by Angelica Davis RN  Outcome: Progressing     Problem: Knowledge Deficit  Goal: Patient/family/caregiver demonstrates understanding of disease process, treatment plan, medications, and discharge instructions  Description: Complete learning assessment and assess knowledge base    Interventions:  - Provide teaching at level of understanding  - Provide teaching via preferred learning methods  2022 by Angelica Davis RN  Outcome: Progressing  2022 by Angelica Davis RN  Outcome: Progressing  Goal: Infant caregiver verbalizes understanding of benefits of skin-to-skin with healthy   Description: Prior to delivery, educate patient regarding skin-to-skin practice and its benefits  Initiate immediate and uninterrupted skin-to-skin contact after birth until breastfeeding is initiated or a minimum of one hour  Encourage continued skin-to-skin contact throughout the post partum stay    2022 by Angelica Davis RN  Outcome: Progressing  2022 by Angelica Davis RN  Outcome: Progressing  Goal: Infant caregiver verbalizes understanding of benefits and management of breastfeeding their healthy   Description: Help initiate breastfeeding within one hour of birth  Educate/assist with breastfeeding positioning and latch  Educate on safe positioning and to monitor their  for safety  Educate on how to maintain lactation even if they are  from their   Educate/initiate pumping for a mom with a baby in the NICU within 6 hours after birth  Give infants no food or drink other than breast milk unless medically indicated  Educate on feeding cues and encourage breastfeeding on demand    2022 by Berkley Closs, RN  Outcome: Progressing  2022 by Berkley Closs, RN  Outcome: Progressing  Goal: Infant caregiver verbalizes understanding of benefits to rooming-in with their healthy   Description: Promote rooming in 23 out of 24 hours per day  Educate on benefits to rooming-in  Provide  care in room with parents as long as infant and mother condition allow    2022 by Berkley Closs, RN  Outcome: Progressing  2022 by Berkley Closs, RN  Outcome: Progressing  Goal: Provide formula feeding instructions and preparation information to caregivers who do not wish to breastfeed their   Description: Provide one on one information on frequency, amount, and burping for formula feeding caregivers throughout their stay and at discharge  Provide written information/video on formula preparation  2022 by Berkley Closs, RN  Outcome: Progressing  2022 by Berkley Closs, RN  Outcome: Progressing  Goal: Infant caregiver verbalizes understanding of support and resources for follow up after discharge  Description: Provide individual discharge education on when to call the doctor  Provide resources and contact information for post-discharge support      2022 by Berkley Closs, RN  Outcome: Progressing  2022 by Berkley Closs, RN  Outcome: Progressing     Problem: DISCHARGE PLANNING  Goal: Discharge to home or other facility with appropriate resources  Description: INTERVENTIONS:  - Identify barriers to discharge w/patient and caregiver  - Arrange for needed discharge resources and transportation as appropriate  - Identify discharge learning needs (meds, wound care, etc )  - Arrange for interpretive services to assist at discharge as needed  - Refer to Case Management Department for coordinating discharge planning if the patient needs post-hospital services based on physician/advanced practitioner order or complex needs related to functional status, cognitive ability, or social support system  11/23/2022 2100 by Sean Horne RN  Outcome: Progressing  11/23/2022 0829 by Sean Horne RN  Outcome: Progressing     Problem: Potential for Falls  Goal: Patient will remain free of falls  Description: INTERVENTIONS:  - Educate patient/family on patient safety including physical limitations  - Instruct patient to call for assistance with activity   - Consult OT/PT to assist with strengthening/mobility   - Keep Call bell within reach  - Keep bed low and locked with side rails adjusted as appropriate  - Keep care items and personal belongings within reach  - Initiate and maintain comfort rounds  - Make Fall Risk Sign visible to staff  - Offer Toileting every 2 Hours, in advance of need  - Initiate/Maintain bed alarm  - Obtain necessary fall risk management equipment:   - Apply yellow socks and bracelet for high fall risk patients  - Consider moving patient to room near nurses station  11/23/2022 2100 by Sean Horne RN  Outcome: Progressing  11/23/2022 0829 by Sean Horne RN  Outcome: Progressing  11/23/2022 0828 by Sean Horne RN  Outcome: Progressing

## 2022-11-24 NOTE — ASSESSMENT & PLAN NOTE
Likely reactive  Chest x-ray showed possible organizing consolidation  Was started on Rocephin, however procalcitonin x2; antibiotics held  Follow-up with PCP in the outpatient setting

## 2022-11-24 NOTE — PROGRESS NOTES
Discharge instructions given via Edgar Thomas on the  ipad  Wes's friend at bedside helped clarify questions and instructions as pt appears to read lips better than he hears   explained the [address]  And encouarged the pt to see encdocrine OP and f/u with PCP  Pt verbally agreed and understood

## 2022-11-24 NOTE — DISCHARGE INSTRUCTIONS
Please remember to take all medications as directed on your medication list and follow-up with your primary care provider in 1-2 weeks  You are encouraged to keep your med list on your person (in your wallet or purse) and please take your medication list provided in this After Visit Summary to your PCP visit following discharge  Please ask your nurse to show you the medication list and explain when to take your medications  Additionally, we encourage all patient's to take their actual medications with them to their primary care visit! This is to verify you have the proper medications and the proper dosages  Remember, while medications are often listed in your computer record; that may not always be right as mistakes can occur at the pharmacy or in the computer and sometimes old medication bottles can be mistaken for newer medications  (Note to nursing: please place patient's medication list on top of the AVS )    Please stop taking insulin until seen by primary care and/or Endocrinology  Her blood pressure medication has been increased, please see accompanying instructions and discuss with primary care

## 2022-11-24 NOTE — ASSESSMENT & PLAN NOTE
Lab Results   Component Value Date    EGFR 78 11/24/2022    EGFR 100 11/23/2022    EGFR 100 11/22/2022    CREATININE 1 08 11/24/2022    CREATININE 0 84 11/23/2022    CREATININE 0 84 11/22/2022   Renal function at baseline

## 2022-11-27 LAB
BACTERIA BLD CULT: NORMAL
BACTERIA BLD CULT: NORMAL

## 2022-12-02 ENCOUNTER — VBI (OUTPATIENT)
Dept: ADMINISTRATIVE | Facility: OTHER | Age: 52
End: 2022-12-02

## 2022-12-08 ENCOUNTER — APPOINTMENT (EMERGENCY)
Dept: CT IMAGING | Facility: HOSPITAL | Age: 52
End: 2022-12-08

## 2022-12-08 ENCOUNTER — HOSPITAL ENCOUNTER (INPATIENT)
Facility: HOSPITAL | Age: 52
LOS: 1 days | Discharge: LEFT BEFORE DISCHARGE | End: 2022-12-08
Attending: EMERGENCY MEDICINE | Admitting: INTERNAL MEDICINE

## 2022-12-08 VITALS
RESPIRATION RATE: 20 BRPM | SYSTOLIC BLOOD PRESSURE: 204 MMHG | OXYGEN SATURATION: 99 % | TEMPERATURE: 97.8 F | DIASTOLIC BLOOD PRESSURE: 95 MMHG | HEART RATE: 84 BPM

## 2022-12-08 DIAGNOSIS — R41.0 CONFUSION: ICD-10-CM

## 2022-12-08 DIAGNOSIS — R41.82 ALTERED MENTAL STATUS: Primary | ICD-10-CM

## 2022-12-08 DIAGNOSIS — E16.2 HYPOGLYCEMIA: ICD-10-CM

## 2022-12-08 DIAGNOSIS — R56.9 SEIZURE (HCC): ICD-10-CM

## 2022-12-08 PROBLEM — F19.90 ILLICIT DRUG USE: Status: ACTIVE | Noted: 2022-12-08

## 2022-12-08 LAB
2HR DELTA HS TROPONIN: 5 NG/L
ALBUMIN SERPL BCP-MCNC: 3.9 G/DL (ref 3.5–5)
ALP SERPL-CCNC: 88 U/L (ref 46–116)
ALT SERPL W P-5'-P-CCNC: 33 U/L (ref 12–78)
AMMONIA PLAS-SCNC: 11 UMOL/L (ref 11–35)
AMPHETAMINES SERPL QL SCN: NEGATIVE
ANION GAP SERPL CALCULATED.3IONS-SCNC: 16 MMOL/L (ref 4–13)
APAP SERPL-MCNC: <2 UG/ML (ref 10–20)
APTT PPP: 24 SECONDS (ref 23–37)
AST SERPL W P-5'-P-CCNC: 27 U/L (ref 5–45)
ATRIAL RATE: 112 BPM
BACTERIA UR QL AUTO: ABNORMAL /HPF
BARBITURATES UR QL: NEGATIVE
BASOPHILS # BLD AUTO: 0.02 THOUSANDS/ÂΜL (ref 0–0.1)
BASOPHILS NFR BLD AUTO: 0 % (ref 0–1)
BENZODIAZ UR QL: NEGATIVE
BILIRUB SERPL-MCNC: 0.47 MG/DL (ref 0.2–1)
BILIRUB UR QL STRIP: NEGATIVE
BUN SERPL-MCNC: 7 MG/DL (ref 5–25)
CALCIUM SERPL-MCNC: 8.9 MG/DL (ref 8.3–10.1)
CARDIAC TROPONIN I PNL SERPL HS: 4 NG/L
CARDIAC TROPONIN I PNL SERPL HS: 9 NG/L
CHLORIDE SERPL-SCNC: 100 MMOL/L (ref 96–108)
CLARITY UR: CLEAR
CO2 SERPL-SCNC: 25 MMOL/L (ref 21–32)
COCAINE UR QL: NEGATIVE
COLOR UR: YELLOW
CREAT SERPL-MCNC: 1.11 MG/DL (ref 0.6–1.3)
EOSINOPHIL # BLD AUTO: 0.08 THOUSAND/ÂΜL (ref 0–0.61)
EOSINOPHIL NFR BLD AUTO: 1 % (ref 0–6)
ERYTHROCYTE [DISTWIDTH] IN BLOOD BY AUTOMATED COUNT: 12.1 % (ref 11.6–15.1)
ETHANOL SERPL-MCNC: <3 MG/DL (ref 0–3)
GFR SERPL CREATININE-BSD FRML MDRD: 75 ML/MIN/1.73SQ M
GLUCOSE SERPL-MCNC: 124 MG/DL (ref 65–140)
GLUCOSE SERPL-MCNC: 134 MG/DL (ref 65–140)
GLUCOSE SERPL-MCNC: 174 MG/DL (ref 65–140)
GLUCOSE SERPL-MCNC: 194 MG/DL (ref 65–140)
GLUCOSE SERPL-MCNC: 63 MG/DL (ref 65–140)
GLUCOSE SERPL-MCNC: 76 MG/DL (ref 65–140)
GLUCOSE UR STRIP-MCNC: ABNORMAL MG/DL
HCT VFR BLD AUTO: 39.5 % (ref 36.5–49.3)
HGB BLD-MCNC: 14 G/DL (ref 12–17)
HGB UR QL STRIP.AUTO: ABNORMAL
HYALINE CASTS #/AREA URNS LPF: ABNORMAL /LPF
IMM GRANULOCYTES # BLD AUTO: 0.04 THOUSAND/UL (ref 0–0.2)
IMM GRANULOCYTES NFR BLD AUTO: 1 % (ref 0–2)
INR PPP: 0.98 (ref 0.84–1.19)
KETONES UR STRIP-MCNC: NEGATIVE MG/DL
LACTATE SERPL-SCNC: 1.9 MMOL/L (ref 0.5–2)
LACTATE SERPL-SCNC: 3.6 MMOL/L (ref 0.5–2)
LEUKOCYTE ESTERASE UR QL STRIP: NEGATIVE
LYMPHOCYTES # BLD AUTO: 1.38 THOUSANDS/ÂΜL (ref 0.6–4.47)
LYMPHOCYTES NFR BLD AUTO: 16 % (ref 14–44)
MCH RBC QN AUTO: 31.6 PG (ref 26.8–34.3)
MCHC RBC AUTO-ENTMCNC: 35.4 G/DL (ref 31.4–37.4)
MCV RBC AUTO: 89 FL (ref 82–98)
METHADONE UR QL: NEGATIVE
MONOCYTES # BLD AUTO: 0.61 THOUSAND/ÂΜL (ref 0.17–1.22)
MONOCYTES NFR BLD AUTO: 7 % (ref 4–12)
NEUTROPHILS # BLD AUTO: 6.68 THOUSANDS/ÂΜL (ref 1.85–7.62)
NEUTS SEG NFR BLD AUTO: 75 % (ref 43–75)
NITRITE UR QL STRIP: NEGATIVE
NON-SQ EPI CELLS URNS QL MICRO: ABNORMAL /HPF
NRBC BLD AUTO-RTO: 0 /100 WBCS
OPIATES UR QL SCN: NEGATIVE
OXYCODONE+OXYMORPHONE UR QL SCN: NEGATIVE
P AXIS: 75 DEGREES
PCP UR QL: NEGATIVE
PH UR STRIP.AUTO: 6.5 [PH] (ref 4.5–8)
PLATELET # BLD AUTO: 189 THOUSANDS/UL (ref 149–390)
PMV BLD AUTO: 10.7 FL (ref 8.9–12.7)
POTASSIUM SERPL-SCNC: 3.2 MMOL/L (ref 3.5–5.3)
PR INTERVAL: 146 MS
PROT SERPL-MCNC: 7.4 G/DL (ref 6.4–8.4)
PROT UR STRIP-MCNC: >=300 MG/DL
PROTHROMBIN TIME: 13 SECONDS (ref 11.6–14.5)
QRS AXIS: 37 DEGREES
QRSD INTERVAL: 94 MS
QT INTERVAL: 358 MS
QTC INTERVAL: 488 MS
RBC # BLD AUTO: 4.43 MILLION/UL (ref 3.88–5.62)
RBC #/AREA URNS AUTO: ABNORMAL /HPF
SALICYLATES SERPL-MCNC: <3 MG/DL (ref 3–20)
SODIUM SERPL-SCNC: 141 MMOL/L (ref 135–147)
SP GR UR STRIP.AUTO: >=1.03 (ref 1–1.03)
T WAVE AXIS: 32 DEGREES
THC UR QL: NEGATIVE
UROBILINOGEN UR QL STRIP.AUTO: 0.2 E.U./DL
VENTRICULAR RATE: 112 BPM
WBC # BLD AUTO: 8.81 THOUSAND/UL (ref 4.31–10.16)
WBC #/AREA URNS AUTO: ABNORMAL /HPF

## 2022-12-08 RX ORDER — ONDANSETRON 2 MG/ML
4 INJECTION INTRAMUSCULAR; INTRAVENOUS EVERY 6 HOURS PRN
Status: DISCONTINUED | OUTPATIENT
Start: 2022-12-08 | End: 2022-12-08 | Stop reason: HOSPADM

## 2022-12-08 RX ORDER — FOLIC ACID 1 MG/1
1 TABLET ORAL DAILY
Status: DISCONTINUED | OUTPATIENT
Start: 2022-12-09 | End: 2022-12-08 | Stop reason: HOSPADM

## 2022-12-08 RX ORDER — SODIUM CHLORIDE 9 MG/ML
50 INJECTION, SOLUTION INTRAVENOUS CONTINUOUS
Status: DISCONTINUED | OUTPATIENT
Start: 2022-12-08 | End: 2022-12-08

## 2022-12-08 RX ORDER — POTASSIUM CHLORIDE 14.9 MG/ML
20 INJECTION INTRAVENOUS ONCE
Status: COMPLETED | OUTPATIENT
Start: 2022-12-08 | End: 2022-12-08

## 2022-12-08 RX ORDER — LORAZEPAM 2 MG/ML
2 INJECTION INTRAMUSCULAR ONCE AS NEEDED
Status: DISCONTINUED | OUTPATIENT
Start: 2022-12-08 | End: 2022-12-08 | Stop reason: HOSPADM

## 2022-12-08 RX ORDER — DEXTROSE MONOHYDRATE 25 G/50ML
25 INJECTION, SOLUTION INTRAVENOUS ONCE
Status: COMPLETED | OUTPATIENT
Start: 2022-12-08 | End: 2022-12-08

## 2022-12-08 RX ORDER — ACETAMINOPHEN 325 MG/1
650 TABLET ORAL EVERY 6 HOURS PRN
Status: DISCONTINUED | OUTPATIENT
Start: 2022-12-08 | End: 2022-12-08 | Stop reason: HOSPADM

## 2022-12-08 RX ORDER — HYDRALAZINE HYDROCHLORIDE 20 MG/ML
5 INJECTION INTRAMUSCULAR; INTRAVENOUS EVERY 6 HOURS PRN
Status: DISCONTINUED | OUTPATIENT
Start: 2022-12-08 | End: 2022-12-08 | Stop reason: HOSPADM

## 2022-12-08 RX ORDER — LISINOPRIL 20 MG/1
20 TABLET ORAL DAILY
Status: DISCONTINUED | OUTPATIENT
Start: 2022-12-09 | End: 2022-12-08 | Stop reason: HOSPADM

## 2022-12-08 RX ORDER — LANOLIN ALCOHOL/MO/W.PET/CERES
100 CREAM (GRAM) TOPICAL DAILY
Status: DISCONTINUED | OUTPATIENT
Start: 2022-12-09 | End: 2022-12-08 | Stop reason: HOSPADM

## 2022-12-08 RX ORDER — DEXTROSE MONOHYDRATE 100 MG/ML
50 INJECTION, SOLUTION INTRAVENOUS CONTINUOUS
Status: DISCONTINUED | OUTPATIENT
Start: 2022-12-08 | End: 2022-12-08 | Stop reason: HOSPADM

## 2022-12-08 RX ADMIN — POTASSIUM CHLORIDE 20 MEQ: 14.9 INJECTION, SOLUTION INTRAVENOUS at 13:45

## 2022-12-08 RX ADMIN — HYDRALAZINE HYDROCHLORIDE 5 MG: 20 INJECTION, SOLUTION INTRAMUSCULAR; INTRAVENOUS at 16:12

## 2022-12-08 RX ADMIN — SODIUM CHLORIDE 50 ML/HR: 0.9 INJECTION, SOLUTION INTRAVENOUS at 13:40

## 2022-12-08 RX ADMIN — DEXTROSE MONOHYDRATE 25 ML: 500 INJECTION PARENTERAL at 14:10

## 2022-12-08 RX ADMIN — SODIUM CHLORIDE 1000 ML: 0.9 INJECTION, SOLUTION INTRAVENOUS at 11:49

## 2022-12-08 RX ADMIN — DEXTROSE 50 ML/HR: 10 SOLUTION INTRAVENOUS at 13:26

## 2022-12-08 NOTE — CASE MANAGEMENT
Case Management ED Progress Note    Patient name Rg Sanchez  Location ED 08/ED 08 MRN [id number]  : 1970 Date 2022        OBJECTIVE:  Predictive Model Details         93% Factor Value    Risk of Hospital Admission or ED Visit Model Number of ED Visits 1     Number of Hospitalizations 3     Has Medicaid Yes     Is in Relationship No     Has Chronic Kidney Disease Yes     Has Atrial Fibrillation Yes     Has Anemia Yes     Has CVD Yes     Has Diabetes Yes            Chief Complaint: Seizure ([address] )   Patient Class: Inpatient  Preferred Pharmacy:   [address] [place] [address],[address], [address]  Phone: [phone] Fax: [phone]    Primary Care Provider: No primary care provider on file  Primary Insurance: [address] [place] [address]  Secondary Insurance:     ED Progress Note:    Case identify as a readmission, this is one of mul;tiple admission to this facility, due to hyper/hypogl;ycemia  Patient is Estonian speaking only  This worker attempted to compelted an assessment, patient refused to participate in interview process, and informed by assigned RN patient has requested to leave AMA  Outpatient CM/SW [facility] Mauri marquez

## 2022-12-08 NOTE — ASSESSMENT & PLAN NOTE
In the setting of hypoglycemia and possible drug use  Will start seizure precautions  Correct hypoglycemia  Antiepileptic is not required

## 2022-12-08 NOTE — ED PROVIDER NOTES
History  Chief Complaint   Patient presents with   • Altered Mental Status     Father found pt confused unable to talk  Last well known was 0830  On arrival pt is confused  History provided by:  EMS personnel   used: No    Altered Mental Status  Presenting symptoms: confusion    Presenting symptoms comment:  Family found patient with foam around his mouth  Severity:  Moderate  Most recent episode: Today  Episode history:  Single  Timing:  Sporadic  Progression:  Improving  Chronicity:  New  Context comment:  Hx of DM, Hypoglycemia, comes with likely seizure at home, famil found foam around his mouth  Associated symptoms: no abdominal pain, no bladder incontinence, no difficulty breathing, no fever, no nausea and no vomiting        Prior to Admission Medications   Prescriptions Last Dose Informant Patient Reported? Taking?    Blood Glucose Monitoring Suppl (Accu-Chek Guide) w/Device KIT   No No   Sig: Use 3 (three) times a day   Insulin Pen Needle (Unifine Pentips) 32G X 4 MM MISC   No No   Sig: Inject as directed daily   Lancets (accu-chek multiclix) lancets   No No   Sig: Check blood sugar 3 times a day   glucose blood (Accu-Chek Guide) test strip   No No   Sig: Check blood sugar 3 times a day   lisinopril-hydrochlorothiazide (PRINZIDE,ZESTORETIC) 20-12 5 MG per tablet   No No   Sig: Take 2 tablets by mouth daily   metoprolol tartrate (LOPRESSOR) 25 mg tablet   No No   Sig: Take 0 5 tablets (12 5 mg total) by mouth every 12 (twelve) hours      Facility-Administered Medications: None       Past Medical History:   Diagnosis Date   • Alcohol abuse    • Chronic pancreatitis (HCC)    • Diabetes mellitus ([facility] )     Type 2   • Pancreatitis    • Paroxysmal A-fib (HCC)    • Thrombocytopenia ([facility] )        Past Surgical History:   Procedure Laterality Date   • INCISION AND DRAINAGE OF WOUND N/A 8/16/2020    Procedure: INCISION AND DRAINAGE (I&D) HEAD/FACE;  Surgeon: Roya Moore DMD;  Location: [facility];  Service: Maxillofacial   • IR BIOPSY BONE MARROW  2/7/2019   • REMOVAL OF IMPACTED TOOTH - COMPLETELY BONY N/A 8/16/2020    Procedure: EXTRACTION TEETH MULTIPLE #2, 3;  Surgeon: Deep Reaves DMD;  Location: [facility];  Service: Maxillofacial       Family History   Problem Relation Age of Onset   • Diabetes Mother    • Hypertension Mother    • Hyperlipidemia Father      I have reviewed and agree with the history as documented  E-Cigarette/Vaping     E-Cigarette/Vaping Substances     Social History     Tobacco Use   • Smoking status: Former   • Smokeless tobacco: Never   Substance Use Topics   • Alcohol use: Yes   • Drug use: Yes     Types: Cocaine       Review of Systems   Unable to perform ROS: Mental status change   Constitutional: Negative for fever  Gastrointestinal: Negative for abdominal pain, nausea and vomiting  Genitourinary: Negative for bladder incontinence  Psychiatric/Behavioral: Positive for confusion  Physical Exam  Physical Exam  Vitals and nursing note reviewed  Constitutional:       General: He is not in acute distress  Appearance: He is well-developed  HENT:      Head: Normocephalic and atraumatic  Mouth/Throat:      Comments: Tongue bite over right lateral aspect  Eyes:      Extraocular Movements: Extraocular movements intact  Cardiovascular:      Rate and Rhythm: Normal rate and regular rhythm  Heart sounds: Normal heart sounds  Pulmonary:      Effort: Pulmonary effort is normal  No respiratory distress  Breath sounds: Normal breath sounds  Abdominal:      Palpations: Abdomen is soft  Tenderness: There is no abdominal tenderness  There is no guarding or rebound  Musculoskeletal:         General: Normal range of motion  Cervical back: Normal range of motion and neck supple  Skin:     General: Skin is warm and dry  Neurological:      General: No focal deficit present        Mental Status: He is alert and oriented to person, place, and time  Cranial Nerves: No cranial nerve deficit or facial asymmetry     Psychiatric:      Comments: Difficult to assess as confused on arrival         Vital Signs  ED Triage Vitals   Temperature Pulse Respirations Blood Pressure SpO2   12/08/22 1205 12/08/22 1136 12/08/22 1136 12/08/22 1136 12/08/22 1136   97 8 °F (36 6 °C) (!) 106 16 (!) 213/98 98 %      Temp Source Heart Rate Source Patient Position - Orthostatic VS BP Location FiO2 (%)   12/08/22 1205 12/08/22 1136 12/08/22 1136 12/08/22 1136 --   Oral Monitor Lying Right arm       Pain Score       --                  Vitals:    12/08/22 1200 12/08/22 1501 12/08/22 1600 12/08/22 1759   BP: (!) 195/92 (!) 213/98 (!) 204/95    Pulse: 92 78 88 84   Patient Position - Orthostatic VS: Lying Lying  Sitting         Visual Acuity      ED Medications  Medications   LORazepam (ATIVAN) injection 2 mg (has no administration in time range)   dextrose infusion 10 % (0 mL/hr Intravenous Stopped 12/8/22 1753)   metoprolol tartrate (LOPRESSOR) partial tablet 12 5 mg (has no administration in time range)   lisinopril (ZESTRIL) tablet 20 mg (has no administration in time range)   hydrALAZINE (APRESOLINE) injection 5 mg (5 mg Intravenous Given 12/8/22 1612)   acetaminophen (TYLENOL) tablet 650 mg (has no administration in time range)   ondansetron (ZOFRAN) injection 4 mg (has no administration in time range)   thiamine tablet 100 mg (has no administration in time range)   folic acid (FOLVITE) tablet 1 mg (has no administration in time range)   multivitamin-minerals (CENTRUM) tablet 1 tablet (has no administration in time range)   sodium chloride 0 9 % bolus 1,000 mL (0 mL Intravenous Stopped 12/8/22 1252)   potassium chloride 20 mEq IVPB (premix) (0 mEq Intravenous Stopped 12/8/22 1545)   dextrose 50 % IV solution 25 mL (25 mL Intravenous Given 12/8/22 1410)       Diagnostic Studies  Results Reviewed     Procedure Component Value Units Date/Time    Fingerstick Glucose (POCT) [[id number]]  (Normal) Collected: 12/08/22 1459    Lab Status: Final result Updated: 12/08/22 1500     POC Glucose 124 mg/dl     Urine Microscopic [[id number]]  (Abnormal) Collected: 12/08/22 1330    Lab Status: Final result Specimen: Urine, Clean Catch Updated: 12/08/22 1410     RBC, UA None Seen /hpf      WBC, UA 0-1 /hpf      Epithelial Cells Occasional /hpf      Bacteria, UA Occasional /hpf      Hyaline Casts, UA 0-1 /lpf     HS Troponin I 2hr [[id number]]  (Normal) Collected: 12/08/22 1335    Lab Status: Final result Specimen: Blood from Arm, Left Updated: 12/08/22 1410     hs TnI 2hr 9 ng/L      Delta 2hr hsTnI 5 ng/L     Lactic acid 2 Hours [[id number]]  (Normal) Collected: 12/08/22 1335    Lab Status: Final result Specimen: Blood from Arm, Left Updated: 12/08/22 1404     LACTIC ACID 1 9 mmol/L     Narrative:      Result may be elevated if tourniquet was used during collection  Fingerstick Glucose (POCT) [[id number]]  (Abnormal) Collected: 12/08/22 1357    Lab Status: Final result Updated: 12/08/22 1358     POC Glucose 63 mg/dl     Rapid drug screen, urine [[id number]]  (Normal) Collected: 12/08/22 1332    Lab Status: Final result Specimen: Urine, Clean Catch Updated: 12/08/22 1357     Amph/Meth UR Negative     Barbiturate Ur Negative     Benzodiazepine Urine Negative     Cocaine Urine Negative     Methadone Urine Negative     Opiate Urine Negative     PCP Ur Negative     THC Urine Negative     Oxycodone Urine Negative    Narrative:      FOR MEDICAL PURPOSES ONLY  IF CONFIRMATION NEEDED PLEASE CONTACT THE LAB WITHIN 5 DAYS      Drug Screen Cutoff Levels:  AMPHETAMINE/METHAMPHETAMINES  1000 ng/mL  BARBITURATES     200 ng/mL  BENZODIAZEPINES     200 ng/mL  COCAINE      300 ng/mL  METHADONE      300 ng/mL  OPIATES      300 ng/mL  PHENCYCLIDINE     25 ng/mL  THC       50 ng/mL  OXYCODONE      100 ng/mL    Urine Macroscopic, POC [[id number]]  (Abnormal) Collected: 12/08/22 1330    Lab Status: Final result Specimen: Urine Updated: 12/08/22 1331     Color, UA Yellow     Clarity, UA Clear     pH, UA 6 5     Leukocytes, UA Negative     Nitrite, UA Negative     Protein, UA >=300 mg/dl      Glucose,  (1/10%) mg/dl      Ketones, UA Negative mg/dl      Urobilinogen, UA 0 2 E U /dl      Bilirubin, UA Negative     Occult Blood, UA Small     Specific [place], UA >=1 030    Narrative:      CLINITEK RESULT    Fingerstick Glucose (POCT) [[id number]]  (Normal) Collected: 12/08/22 1308    Lab Status: Final result Updated: 12/08/22 1310     POC Glucose 76 mg/dl     Acetaminophen level-If concentration is detectable, please discuss with medical  on call  [[id number]]  (Abnormal) Collected: 12/08/22 1143    Lab Status: Final result Specimen: Blood from Arm, Left Updated: 12/08/22 1240     Acetaminophen Level <2 ug/mL     Ethanol [[id number]]  (Normal) Collected: 12/08/22 1143    Lab Status: Final result Specimen: Blood from Arm, Left Updated: 12/08/22 1235     Ethanol Lvl <3 mg/dL     Lactic acid [[id number]]  (Abnormal) Collected: 12/08/22 1143    Lab Status: Final result Specimen: Blood from Arm, Left Updated: 12/08/22 1234     LACTIC ACID 3 6 mmol/L     Narrative:      Result may be elevated if tourniquet was used during collection      HS Troponin 0hr (reflex protocol) [[id number]]  (Normal) Collected: 12/08/22 1143    Lab Status: Final result Specimen: Blood from Arm, Left Updated: 12/08/22 1221     hs TnI 0hr 4 ng/L     Comprehensive metabolic panel [[id number]]  (Abnormal) Collected: 12/08/22 1143    Lab Status: Final result Specimen: Blood from Arm, Left Updated: 12/08/22 1215     Sodium 141 mmol/L      Potassium 3 2 mmol/L      Chloride 100 mmol/L      CO2 25 mmol/L      ANION GAP 16 mmol/L      BUN 7 mg/dL      Creatinine 1 11 mg/dL      Glucose 194 mg/dL      Calcium 8 9 mg/dL      AST 27 U/L      ALT 33 U/L      Alkaline Phosphatase 88 U/L      Total Protein 7 4 g/dL      Albumin 3 9 g/dL      Total Bilirubin 0 47 mg/dL      eGFR 75 ml/min/1 73sq m     Narrative:      National Kid[employer] Disease Foundation guidelines for Chronic Kidney Disease (CKD):   •  Stage 1 with normal or high GFR (GFR > 90 mL/min/1 73 square meters)  •  Stage 2 Mild CKD (GFR = 60-89 mL/min/1 73 square meters)  •  Stage 3A Moderate CKD (GFR = 45-59 mL/min/1 73 square meters)  •  Stage 3B Moderate CKD (GFR = 30-44 mL/min/1 73 square meters)  •  Stage 4 Severe CKD (GFR = 15-29 mL/min/1 73 square meters)  •  Stage 5 End Stage CKD (GFR <15 mL/min/1 73 square meters)  Note: GFR calculation is accurate only with a steady state creatinine    Salicylate level [[id number]]  (Abnormal) Collected: 12/08/22 1143    Lab Status: Final result Specimen: Blood from Arm, Left Updated: 07/84/01 7866     Salicylate Lvl <3 mg/dL     Protime-INR [[id number]]  (Normal) Collected: 12/08/22 1143    Lab Status: Final result Specimen: Blood from Arm, Left Updated: 12/08/22 1213     Protime 13 0 seconds      INR 0 98    APTT [[id number]]  (Normal) Collected: 12/08/22 1143    Lab Status: Final result Specimen: Blood from Arm, Left Updated: 12/08/22 1213     PTT 24 seconds     Ammonia [[id number]]  (Normal) Collected: 12/08/22 1143    Lab Status: Final result Specimen: Blood from Arm, Left Updated: 12/08/22 1209     Ammonia 11 umol/L     Fingerstick Glucose (POCT) [[id number]]  (Normal) Collected: 12/08/22 1202    Lab Status: Final result Updated: 12/08/22 1204     POC Glucose 134 mg/dl     CBC and differential [[id number]] Collected: 12/08/22 1143    Lab Status: Final result Specimen: Blood from Arm, Left Updated: 12/08/22 1156     WBC 8 81 Thousand/uL      RBC 4 43 Million/uL      Hemoglobin 14 0 g/dL      Hematocrit 39 5 %      MCV 89 fL      MCH 31 6 pg      MCHC 35 4 g/dL      RDW 12 1 %      MPV 10 7 fL      Platelets 016 Thousands/uL      nRBC 0 /100 WBCs      Neutrophils Relative 75 %      Immat GRANS % 1 %      Lymphocytes Relative 16 %      Monocytes Relative 7 % Eosinophils Relative 1 %      Basophils Relative 0 %      Neutrophils Absolute 6 68 Thousands/µL      Immature Grans Absolute 0 04 Thousand/uL      Lymphocytes Absolute 1 38 Thousands/µL      Monocytes Absolute 0 61 Thousand/µL      Eosinophils Absolute 0 08 Thousand/µL      Basophils Absolute 0 02 Thousands/µL     Fingerstick Glucose (POCT) [[id number]]  (Abnormal) Collected: 12/08/22 1127    Lab Status: Final result Updated: 12/08/22 1130     POC Glucose 174 mg/dl                  CT head without contrast   Final Result by López Arredondo MD (12/08 1306)      No acute intracranial abnormality  Workstation performed: [id number]                    Procedures  ECG 12 Lead Documentation Only    Date/Time: 12/8/2022 12:50 PM  Performed by: Jaden Sellers MD  Authorized by: Jaden Sellers MD     Indications / Diagnosis:  Confusion   ECG reviewed by me, the ED Provider: yes    Patient location:  ED  Interpretation:     Interpretation: normal    Rate:     ECG rate assessment: normal    Rhythm:     Rhythm: sinus rhythm    Ectopy:     Ectopy: none    QRS:     QRS axis:  Normal  Conduction:     Conduction: normal    ST segments:     ST segments:  Normal  T waves:     T waves: normal               ED Course  ED Course as of 12/08/22 1848   Thu Dec 08, 2022   1239 WBC: 8 81   1239 Hemoglobin: 14 0   1239 Platelet Count: 800   1239 Sodium: 141   1239 Potassium(!): 3 2   1240 BUN: 7   1240 Creatinine: 1 11   1240 Glucose, Random(!): 194   1240 Ammonia: 11   1240 LACTIC ACID(!!): 3 6  Labs reviewed, lactic from likely seizure, possibly due to hypoglycemia  No fever or infectious s/s  IVF were given  1310 POC Glucose: 76  Glucose coming down, will put on D10W   With KCL and NSS    1347 Case discussed with Dr Yudelka Crystal for admission,                                             MDM  Number of Diagnoses or Management Options  Altered mental status: new and requires workup  Confusion: new and requires workup  Hypoglycemia: new and requires workup  Seizure [facility]): new and requires workup  Diagnosis management comments: Is a 55-year-old male, history of diabetes, brought in by EMS after family found him with him around his mouth, altered mental status, confused, possible seizure, no history of prior seizure, patient does have history of hypoglycemia requiring admission for that  Arrival, patient appears confused, possibly postictal, has tongue bite on the right side of the tongue, following commands, normal cranial nerve or focal neurodeficits, no neck stiffness, signs are stable, afebrile, respiratory distress, abdomen soft nontender  Impression: Altered mental status, possible seizure, postictal, encephalopathy, hypoglycemia, will check labs, EKG, CT head, observe for recurrent seizure  Amount and/or Complexity of Data Reviewed  Clinical lab tests: reviewed and ordered  Tests in the radiology section of CPT®: ordered and reviewed  Tests in the medicine section of CPT®: ordered and reviewed  Discuss the patient with other providers: yes  Independent visualization of images, tracings, or specimens: yes        Disposition  Final diagnoses:    Altered mental status   Confusion   Seizure ([facility] ) - possible   Hypoglycemia     Time reflects when diagnosis was documented in both MDM as applicable and the Disposition within this note     Time User Action Codes Description Comment    12/8/2022 12:51 PM Fernanda Kind Add [R41 82] Altered mental status     12/8/2022 12:51 PM Fernanda Kind Add [R41 0] Confusion     12/8/2022 12:51 PM Fernanda Kind Add [R56 9] Seizure ([address] )     12/8/2022 12:51 PM Fernanda Kind Modify [R56 9] Seizure ([address] ) possible    12/8/2022  1:45 PM Fernanda Kind Add [E16 2] Hypoglycemia       ED Disposition     ED Disposition   Admit    Condition   Stable    Date/Time   Thu Dec 8, 2022  1:45 PM    Comment   Case was discussed with Dr Magdalena Osullivan and the patient's admission status was agreed to be Admission Status: inpatient status to the service of Dr Diana Silverman  Follow-up Information    None         Discharge Medication List as of 12/8/2022  6:10 PM      CONTINUE these medications which have NOT CHANGED    Details   Blood Glucose Monitoring Suppl (Accu-Chek Guide) w/Device KIT Use 3 (three) times a day, Starting Sun 9/4/2022, Normal      glucose blood (Accu-Chek Guide) test strip Check blood sugar 3 times a day, Normal      Insulin Pen Needle (Unifine Pentips) 32G X 4 MM MISC Inject as directed daily, Starting Sun 9/4/2022, Until Mon 10/24/2022, Normal      Lancets (accu-chek multiclix) lancets Check blood sugar 3 times a day, Normal      lisinopril-hydrochlorothiazide (PRINZIDE,ZESTORETIC) 20-12 5 MG per tablet Take 2 tablets by mouth daily, Starting Thu 11/24/2022, Until Sat 12/24/2022, Normal      metoprolol tartrate (LOPRESSOR) 25 mg tablet Take 0 5 tablets (12 5 mg total) by mouth every 12 (twelve) hours, Starting Sat 7/30/2022, Until Mon 8/29/2022, Normal             No discharge procedures on file      PDMP Review       Value Time User    PDMP Reviewed  Yes 8/18/2020 11:39 AM Jaymie Yost MD          ED Provider  Electronically Signed by           Connor Verma MD  12/08/22 5880

## 2022-12-08 NOTE — ASSESSMENT & PLAN NOTE
With uncontrolled htn on admission  Likely from missed medications  Will restart home medications and add prn hydralazine

## 2022-12-08 NOTE — H&P
[address] [place] [address] Road 1970, 46 y o  male MRN: [id number]  Unit/Bed#: ED 08 Encounter: [id number]  Primary Care Provider: No primary care provider on file  Date and time admitted to hospital: 12/8/2022 11:16 AM    * Seizure [facility])  Assessment & Plan  In the setting of hypoglycemia and possible drug use  Will start seizure precautions  Correct hypoglycemia  Antiepileptic is not required     Illicit drug use  Assessment & Plan  uds was negative  Possible track marks noted on right arm  H/o cocaine use  Host referral     Hypoglycemia  Assessment & Plan  In the setting of type 2 diabetes  a1c is 6 7  He was educated in the past to decrease lantus dose and even to change to oral antidiabetic medications  Pt refused  He continues to use 25 units and took 25 units last evening  His blood glucose remains low  He was started on d10w   Will monitor blood glucose q1 hour and adjust fluids as needed  Paroxysmal A-fib (HCC)  Assessment & Plan  Currently not on ac  Continue metoprolol     Uncomplicated alcohol dependence ([facility] [address] )  Assessment & Plan  Will start ciwa protocol  Thiamine and folate  Uncertain when last drink was    Essential hypertension  Assessment & Plan  With uncontrolled htn on admission  Likely from missed medications  Will restart home medications and add prn hydralazine  VTE Prophylaxis:  Low risk, early ambulation   Code Status: full code    Anticipated Length of Stay:  Patient will be admitted on an Inpatient basis with an anticipated length of stay of  Greater than  2 midnights  Chief Complaint:   Seizure     History of Present Illness:    Annamaria Ceballos is a 46 y o  male with pmhx of type 2 diabetes in which he was instructed in the past to reduce lantus but still takes 25 units of lantus at night and took this last night  He was found confused this am by his father  He was foaming at the mouth and bite his tongue   His blood glucose was low on arrival  [id number] in which pt admitted to taking 25 units of lantus  He got defensive saying he couldn't hear the interpretor when asked if he took other medications  Currently he denies any problems  He also reports he has been eating well and not missing meals     Review of Systems:    Review of Systems   Constitutional: Positive for activity change  Negative for appetite change  HENT: Negative  Eyes: Negative  Respiratory: Negative  Cardiovascular: Negative  Gastrointestinal: Negative  Negative for abdominal pain, nausea and vomiting  Endocrine: Negative  Genitourinary: Negative  Musculoskeletal: Negative  Skin: Negative  Allergic/Immunologic: Negative  Neurological: Negative  Hematological: Negative  Psychiatric/Behavioral: Negative  Past Medical and Surgical History:     Past Medical History:   Diagnosis Date   • Alcohol abuse    • Chronic pancreatitis ([facility] )    • Diabetes mellitus ([facility] )     Type 2   • Pancreatitis    • Paroxysmal A-fib ([facility] )    • Thrombocytopenia ([facility] )        Past Surgical History:   Procedure Laterality Date   • INCISION AND DRAINAGE OF WOUND N/A 8/16/2020    Procedure: INCISION AND DRAINAGE (I&D) HEAD/FACE;  Surgeon: Roya Moore DMD;  Location: [facility] MAIN OR;  Service: Maxillofacial   • IR BIOPSY BONE MARROW  2/7/2019   • REMOVAL OF IMPACTED TOOTH - COMPLETELY BONY N/A 8/16/2020    Procedure: EXTRACTION TEETH MULTIPLE #2, 3;  Surgeon: Roya Moore DMD;  Location: [facility] MAIN [facility];  Service: Maxillofacial       Meds/Allergies:    Prior to Admission medications    Medication Sig Start Date End Date Taking?  Authorizing Provider   Blood Glucose Monitoring Suppl (Accu-Chek Guide) w/Device KIT Use 3 (three) times a day 9/4/22   Wilbur Christian MD   glucose blood (Accu-Chek Guide) test strip Check blood sugar 3 times a day 9/4/22   Wilbur Christian MD   Insulin Pen Needle (Unifine Pentips) 32G X 4 MM MISC Inject as directed daily 9/4/22 10/24/22 Corky Kim MD   Lancets (accu-chek multiclix) lancets Check blood sugar 3 times a day 9/4/22   Corky Kim MD   lisinopril-hydrochlorothiazide [facility]) 20-12 5 MG per tablet Take 2 tablets by mouth daily 11/24/22 12/24/22  Ernie Madrid MD   metoprolol tartrate (LOPRESSOR) 25 mg tablet Take 0 5 tablets (12 5 mg total) by mouth every 12 (twelve) hours 7/30/22 8/29/22  Corky Vazquez MD   metFORMIN (GLUCOPHAGE) 500 mg tablet Take 1 tablet (500 mg total) by mouth 2 (two) times a day with meals 7/30/22 7/30/22  Corky Vazquez MD     I have reviewed home medications with patient personally  Allergies: No Known Allergies    Social History:     Marital Status: Single     Substance Use History:   Social History     Substance and Sexual Activity   Alcohol Use Yes     Social History     Tobacco Use   Smoking Status Former   Smokeless Tobacco Never     Social History     Substance and Sexual Activity   Drug Use Yes   • Types: Cocaine       Family History:    non-contributory    Physical Exam:     Vitals:   Blood Pressure: (!) 195/92 (12/08/22 1200)  Pulse: 92 (12/08/22 1200)  Temperature: 97 8 °F (36 6 °C) (12/08/22 1205)  Temp Source: Oral (12/08/22 1205)  Respirations: 20 (12/08/22 1200)  SpO2: 100 % (12/08/22 1200)    Physical Exam  Constitutional:       Appearance: Normal appearance  HENT:      Head: Normocephalic and atraumatic  Eyes:      Extraocular Movements: Extraocular movements intact  Pupils: Pupils are equal, round, and reactive to light  Cardiovascular:      Rate and Rhythm: Normal rate and regular rhythm  Heart sounds: No murmur heard  No friction rub  No gallop  Pulmonary:      Effort: Pulmonary effort is normal  No respiratory distress  Breath sounds: Normal breath sounds  No wheezing, rhonchi or rales  Abdominal:      General: Bowel sounds are normal  There is no distension  Palpations: Abdomen is soft  Tenderness:  There is no abdominal tenderness  There is no guarding or rebound  Skin:     Comments: Track marks seen on right anti cubital fossa    Neurological:      Mental Status: He is alert and oriented to person, place, and time  Additional Data:     Lab Results: I have personally reviewed pertinent reports  Results from last 7 days   Lab Units 12/08/22  1143   WBC Thousand/uL 8 81   HEMOGLOBIN g/dL 14 0   HEMATOCRIT % 39 5   PLATELETS Thousands/uL 189   NEUTROS PCT % 75   LYMPHS PCT % 16   MONOS PCT % 7   EOS PCT % 1     Results from last 7 days   Lab Units 12/08/22  1143   POTASSIUM mmol/L 3 2*   CHLORIDE mmol/L 100   CO2 mmol/L 25   BUN mg/dL 7   CREATININE mg/dL 1 11   CALCIUM mg/dL 8 9   ALK PHOS U/L 88   ALT U/L 33   AST U/L 27     Results from last 7 days   Lab Units 12/08/22  1143   INR  0 98       Imaging: I have personally reviewed pertinent reports  CT head without contrast    Result Date: 12/8/2022  Narrative: CT BRAIN - WITHOUT CONTRAST INDICATION:   AMS, ? Seizure  COMPARISON:  Head CT September 1, 2022 TECHNIQUE:  CT examination of the brain was performed  In addition to axial images, sagittal and coronal 2D reformatted images were created and submitted for interpretation  Radiation dose length product (DLP) for this visit:  0699 [id number] 28 85 mGy-cm   This examination, like all CT scans performed in the [facility], was performed utilizing techniques to minimize radiation dose exposure, including the use of iterative reconstruction and automated exposure control  IMAGE QUALITY:  Diagnostic  FINDINGS: PARENCHYMA:  No intracranial mass, mass effect or midline shift  No CT signs of acute infarction  No acute parenchymal hemorrhage  VENTRICLES AND EXTRA-AXIAL SPACES:  Normal for the patient's age  VISUALIZED ORBITS AND PARANASAL SINUSES:  Normal visualized orbits  Normal visualized paranasal sinuses  CALVARIUM AND EXTRACRANIAL SOFT TISSUES:  Normal      Impression: No acute intracranial abnormality   Workstation performed: [id number]       EKG, Pathology, and Other Studies Reviewed on Admission:   · EKG: sinus tach at 112 bpm    Epic / Care Everywhere Records Reviewed:  Yes

## 2022-12-08 NOTE — QUICK NOTE
Notified by nurse that pt wanted to leave [address] [place] [address]   Discussed with pt about hypoglycemia, seizures, and death using cryacrom interpreting service  Signed ama form

## 2022-12-08 NOTE — ASSESSMENT & PLAN NOTE
In the setting of type 2 diabetes  a1c is 6 7  He was educated in the past to decrease lantus dose and even to change to oral antidiabetic medications  Pt refused  He continues to use 25 units and took 25 units last evening  His blood glucose remains low  He was started on d10w   Will monitor blood glucose q1 hour and adjust fluids as needed

## 2022-12-09 ENCOUNTER — PATIENT OUTREACH (OUTPATIENT)
Dept: CASE MANAGEMENT | Facility: HOSPITAL | Age: 52
End: 2022-12-09

## 2022-12-09 LAB
ATRIAL RATE: 77 BPM
P AXIS: 37 DEGREES
PR INTERVAL: 148 MS
QRS AXIS: 36 DEGREES
QRSD INTERVAL: 90 MS
QT INTERVAL: 394 MS
QTC INTERVAL: 445 MS
T WAVE AXIS: 3 DEGREES
VENTRICULAR RATE: 77 BPM

## 2022-12-09 NOTE — UTILIZATION REVIEW
Initial Clinical Review    Admission: Date/Time/Statement:   Admission Orders (From admission, onward)     Ordered        12/08/22 1347  INPATIENT ADMISSION  Once                      Orders Placed This Encounter   Procedures   • INPATIENT ADMISSION     Standing Status:   Standing     Number of Occurrences:   1     Order Specific Question:   Level of Care     Answer:   Level 2 Stepdown / HOT [14]     Order Specific Question:   Estimated length of stay     Answer:   More than 2 Midnights     Order Specific Question:   Certification     Answer:   I certify that inpatient services are medically necessary for this patient for a duration of greater than two midnights  See H&P and MD Progress Notes for additional information about the patient's course of treatment  ED Arrival Information     Expected   -    Arrival   12/8/2022 11:16    Acuity   Urgent            Means of arrival   Ambulance    Escorted by   [place] ([address])    Service   Hospitalist    Admission type   Emergency            Arrival complaint   Seizure           Chief Complaint   Patient presents with   • Altered Mental Status     Father found pt confused unable to talk  Last well known was 0830  On arrival pt is confused  Initial Presentation: 46 y o  male presents to ED via  EMS  From home  After being found confused by father  Found foaming at mouth with tongue bite  Blood sugar low on arrival  Using interpretor, became defensive with interpretor  PMH is  DM 2, was  Instructed to reduce lantus, still takes  25  U at night and did take the night prior to admission,  PAF,  Essential hypertension and  Alcohol/drug  Dependence,  Track marks noted on right arm,    States he has been eating  Admit  Ip  With  Seizure,  Illicit  Drug use, Hypoglycemia and plan is seizure precautions,  Monitor labs and blood sugars, not requiring antiepileptic,  IV  D10W,  MVI and continue  Other home meds        12/8   5: 47  PM  SIGNED AMA        ED Triage Vitals   Temperature Pulse Respirations Blood Pressure SpO2   12/08/22 1205 12/08/22 1136 12/08/22 1136 12/08/22 1136 12/08/22 1136   97 8 °F (36 6 °C) (!) 106 16 (!) 213/98 98 %      Temp Source Heart Rate Source Patient Position - Orthostatic VS BP Location FiO2 (%)   12/08/22 1205 12/08/22 1136 12/08/22 1136 12/08/22 1136 --   Oral Monitor Lying Right arm       Pain Score       --                 Wt Readings from Last 1 Encounters:   11/22/22 73 3 kg (161 lb 9 6 oz)     Additional Vital Signs:    -- 84 20 -- -- 99 % None (Room air) Sitting   12/08/22 1600 -- 88 18 204/95 Abnormal  -- -- -- --   12/08/22 1501 -- 78 20 213/98 Abnormal  -- 100 % None (Room air) Lying   12/08/22 1205 97 8 °F (36 6 °C) -- -- -- -- -- -- --   12/08/22 1200 -- 92 20 195/92 Abnormal  132 100 % None (Room air) Lying   12/08/22 1145 -- -- -- -- -- -- None (Room air) --   12/08/22 1136 -- 106 Abnormal  16 213/98 Abnormal  -- 98 % None (Room air) Lying         Pertinent Labs/Diagnostic Test Results:   EKG     NSR     CT head without contrast   Final Result by López Arredondo MD (12/08 1306)      No acute intracranial abnormality                    Workstation performed: [id number]               Results from last 7 days   Lab Units 12/08/22  1143   WBC Thousand/uL 8 81   HEMOGLOBIN g/dL 14 0   HEMATOCRIT % 39 5   PLATELETS Thousands/uL 189   NEUTROS ABS Thousands/µL 6 68         Results from last 7 days   Lab Units 12/08/22  1143   SODIUM mmol/L 141   POTASSIUM mmol/L 3 2*   CHLORIDE mmol/L 100   CO2 mmol/L 25   ANION GAP mmol/L 16*   BUN mg/dL 7   CREATININE mg/dL 1 11   EGFR ml/min/1 73sq m 75   CALCIUM mg/dL 8 9     Results from last 7 days   Lab Units 12/08/22  1143   AST U/L 27   ALT U/L 33   ALK PHOS U/L 88   TOTAL PROTEIN g/dL 7 4   ALBUMIN g/dL 3 9   TOTAL BILIRUBIN mg/dL 0 47   AMMONIA umol/L 11     Results from last 7 days   Lab Units 12/08/22  1459 12/08/22  1357 12/08/22  1308 12/08/22  1202 12/08/22  1127 POC GLUCOSE mg/dl 124 63* 76 134 174*     Results from last 7 days   Lab Units 12/08/22  1143   GLUCOSE RANDOM mg/dL 194*                       Results from last 7 days   Lab Units 12/08/22  1335 12/08/22  1143   HS TNI 0HR ng/L  --  4   HS TNI 2HR ng/L 9  --    HSTNI D2 ng/L 5  --          Results from last 7 days   Lab Units 12/08/22  1143   PROTIME seconds 13 0   INR  0 98   PTT seconds 24             Results from last 7 days   Lab Units 12/08/22  1335 12/08/22  1143   LACTIC ACID mmol/L 1 9 3 6*           Results from last 7 days   Lab Units 12/08/22  1330   CLARITY UA  Clear   COLOR UA  Yellow   SPEC GRAV UA  >=1 030   PH UA  6 5   GLUCOSE UA mg/dl 100 (1/10%)*   KETONES UA mg/dl Negative   BLOOD UA  Small*   PROTEIN UA mg/dl >=300*   NITRITE UA  Negative   BILIRUBIN UA  Negative   UROBILINOGEN UA E U /dl 0 2   LEUKOCYTES UA  Negative   WBC UA /hpf 0-1*   RBC UA /hpf None Seen   BACTERIA UA /hpf Occasional   EPITHELIAL CELLS WET PREP /hpf Occasional             Results from last 7 days   Lab Units 12/08/22  1332   AMPH/METH  Negative   BARBITURATE UR  Negative   BENZODIAZEPINE UR  Negative   COCAINE UR  Negative   METHADONE URINE  Negative   OPIATE UR  Negative   PCP UR  Negative   THC UR  Negative     Results from last 7 days   Lab Units 12/08/22  1143   ETHANOL LVL mg/dL <3   ACETAMINOPHEN LVL ug/mL <2*   SALICYLATE LVL mg/dL <3*             ED Treatment:   Medication Administration from 12/08/2022 1116 to 12/09/2022 1320       Date/Time Order Dose Route Action Comments     12/08/2022 1252 EST sodium chloride 0 9 % bolus 1,000 mL 0 mL Intravenous Stopped --     12/08/2022 1149 EST sodium chloride 0 9 % bolus 1,000 mL 1,000 mL Intravenous New Bag --     12/08/2022 1753 EST dextrose infusion 10 % 0 mL/hr Intravenous Stopped --     12/08/2022 1326 EST dextrose infusion 10 % 50 mL/hr Intravenous New Bag --     12/08/2022 1545 EST potassium chloride 20 mEq IVPB (premix) 0 mEq Intravenous Stopped -- 12/08/2022 1345 EST potassium chloride 20 mEq IVPB (premix) 20 mEq Intravenous New Bag --     12/08/2022 1608 EST sodium chloride 0 9 % infusion 0 mL/hr Intravenous Stopped --     12/08/2022 1340 EST sodium chloride 0 9 % infusion 50 mL/hr Intravenous New Bag --     12/08/2022 1410 EST dextrose 50 % IV solution 25 mL 25 mL Intravenous Given --     12/08/2022 1612 EST hydrALAZINE (APRESOLINE) injection 5 mg 5 mg Intravenous Given --        Present on Admission:  • Hypoglycemia  • Uncomplicated alcohol dependence (HCC)  • Paroxysmal A-fib (HCC)  • Essential hypertension      Admitting Diagnosis: Seizure ([facility] [address] ) [R56 9]  Age/Sex: 46 y o  male  Admission Orders:  Scheduled Medications:  Folic acid daily  MVI daily  Lopressor  Q 12 hrs  Lisinopril daily        Continuous IV Infusions:  IVF  50/hr      PRN Meds:    Seizure precautions  24 hr tele  CIWA scores  Neuro checks    Network Utilization Review Department  ATTENTION: Please call with any questions or concerns to [phone] and carefully listen to the prompts so that you are directed to the right person  All voicemails are confidential   Nika Bee all requests for admission clinical reviews, approved or denied determinations and any other requests to dedicated fax number below belonging to the campus where the patient is receiving treatment   List of dedicated fax numbers for the Facilities:  [address] DENIALS (Administrative/Medical Necessity) [phone]   [address] (Maternity/NICU/Pediatrics) [phone]   [address] Jayde Lynn [phone]   [facility] 77 [phone]   [facility] [place] [address] Medical [place] [address] [place] [address] [address] [address] Erick Pandey [place] [address] [phone] [address] [phone]   [address] [phone]

## 2022-12-09 NOTE — UTILIZATION REVIEW
NOTIFICATION OF INPATIENT ADMISSION   AUTHORIZATION REQUEST   SERVICING FACILITY:   [address] [place] [address] E [facility]  Tax ID: [id number]  NPI: [id number] ATTENDING PROVIDER:  Attending Name and NPI#: Elizabeth Blakely [[id number]]  Address: [address] [place] [address]  Phone: [phone]     ADMISSION INFORMATION:  Place of Service: Inpatient [address] [facility]  [address]  Place of Service Code: 21  Inpatient Admission Date/Time: 12/8/22  1:47 PM  Discharge Date/Time: 12/8/2022  6:10 PM  Admitting Diagnosis Code/Description:  Seizure ([facility] [address] ) [R56 9]     UTILIZATION REVIEW CONTACT:  Marcos Lagos Utilization   Network Utilization Review Department  Phone: [phone]  Fax: [phone]  Email: [email or web address]  Contact for approvals/pending authorizations, clinical reviews, and discharge  PHYSICIAN ADVISORY SERVICES:  Medical Necessity Denial & [employer] Review  Phone: [phone]  Fax: [phone]  Email: [email or web address]  [email or web address]

## 2022-12-12 NOTE — UTILIZATION REVIEW
NOTIFICATION OF ADMISSION DISCHARGE   This is a Notification of Discharge from [facility]  Please be advised that this patient has been discharge from our facility  Below you will find the admission and discharge date and time including the patient’s disposition  UTILIZATION REVIEW CONTACT:  Nahun Garzon  Utilization   Network Utilization Review Department  Phone: [phone] x carefully listen to the prompts  All voicemails are confidential   Email: [email or web address]     ADMISSION INFORMATION  PRESENTATION DATE: 12/8/2022 11:16 AM    INPATIENT ADMISSION DATE: 12/8/22  1:47 PM   DISCHARGE DATE: 12/8/2022  6:10 PM   DISPOSITION:Left against medical advice or discontinued care    IMPORTANT INFORMATION:  Send all requests for admission clinical reviews, approved or denied determinations and any other requests to dedicated fax number below belonging to the campus where the patient is receiving treatment   List of dedicated fax numbers:  [address] DENIALS (Administrative/Medical Necessity) [phone]   [address] (Maternity/NICU/Pediatrics) [phone]   [facility] [phone]   [place] [address] [phone]   [address] [phone]   220 [facility] [phone]   90 [facility] [phone]   [facility] [place] [address] [phone]   [facility]  [phone]   4055 [facility] [phone]   412 [facility] 850 [facility] [phone]

## 2022-12-29 DIAGNOSIS — Z71.89 COMPLEX CARE COORDINATION: Primary | ICD-10-CM

## 2022-12-29 NOTE — PROGRESS NOTES
Patient reviewed by [address] [place] [address] [employer] 12/29/22, a care plan is not appropriate at this time  Patient is referred to complex care management as a [employer] Utilizer

## 2022-12-30 ENCOUNTER — PATIENT OUTREACH (OUTPATIENT)
Dept: CASE MANAGEMENT | Facility: HOSPITAL | Age: 52
End: 2022-12-30

## 2022-12-30 NOTE — PROGRESS NOTES
Patient identified as [employer] Utilizer  Outside records through Martín requested and refreshed  Chart review completed  Patient has past medical history of  Chronic pancreatitis, DM2, CKD three, HTN, A-fib, alcohol dependence, anemia, seizures and cocaine use  See problem list for complete list of medical diagnosis  Utilization  • 12/8/22 for Altered Mental Status  • 11/22/2022 - 11/24/2022 for Hypoglycemia     Future appointments:  none  -----------------------------------------------  Several unsuccessful attempts made to connect with [employer] (IS)  Recorded message states Interpretative Services is currently unavailable  A message was left on the IS line with request to return call to assist in contacting patient  [facility] IT dept was also contacted  With the assistance [employer] STANTON#[id number], this CM contacted the patient  A Norwegian peaking woman answered the phone reporting she is presently alone and there is no Wes at this number ([phone])  RN CM will attempt another outreach next week

## 2022-12-31 ENCOUNTER — APPOINTMENT (EMERGENCY)
Dept: CT IMAGING | Facility: HOSPITAL | Age: 52
End: 2022-12-31

## 2022-12-31 ENCOUNTER — HOSPITAL ENCOUNTER (OUTPATIENT)
Facility: HOSPITAL | Age: 52
Setting detail: OBSERVATION
Discharge: HOME | End: 2023-01-02
Attending: EMERGENCY MEDICINE | Admitting: STUDENT IN AN ORGANIZED HEALTH CARE EDUCATION/TRAINING PROGRAM

## 2022-12-31 ENCOUNTER — APPOINTMENT (EMERGENCY)
Dept: RADIOLOGY | Facility: HOSPITAL | Age: 52
End: 2022-12-31

## 2022-12-31 DIAGNOSIS — E87.6 HYPOKALEMIA: ICD-10-CM

## 2022-12-31 DIAGNOSIS — F10.920 ALCOHOLIC INTOXICATION WITHOUT COMPLICATION (HCC): Primary | ICD-10-CM

## 2022-12-31 DIAGNOSIS — E16.2 HYPOGLYCEMIA: ICD-10-CM

## 2022-12-31 LAB
ALBUMIN SERPL BCP-MCNC: 3.4 G/DL (ref 3.5–5)
ALP SERPL-CCNC: 90 U/L (ref 46–116)
ALT SERPL W P-5'-P-CCNC: 22 U/L (ref 12–78)
AMMONIA PLAS-SCNC: 22 UMOL/L (ref 11–35)
ANION GAP SERPL CALCULATED.3IONS-SCNC: 12 MMOL/L (ref 4–13)
APTT PPP: 25 SECONDS (ref 23–37)
AST SERPL W P-5'-P-CCNC: 23 U/L (ref 5–45)
BASOPHILS # BLD AUTO: 0.02 THOUSANDS/ÂΜL (ref 0–0.1)
BASOPHILS NFR BLD AUTO: 0 % (ref 0–1)
BILIRUB SERPL-MCNC: 0.39 MG/DL (ref 0.2–1)
BUN SERPL-MCNC: 9 MG/DL (ref 5–25)
CALCIUM ALBUM COR SERPL-MCNC: 9 MG/DL (ref 8.3–10.1)
CALCIUM SERPL-MCNC: 8.5 MG/DL (ref 8.3–10.1)
CHLORIDE SERPL-SCNC: 103 MMOL/L (ref 96–108)
CO2 SERPL-SCNC: 29 MMOL/L (ref 21–32)
CREAT SERPL-MCNC: 1.2 MG/DL (ref 0.6–1.3)
EOSINOPHIL # BLD AUTO: 0.09 THOUSAND/ÂΜL (ref 0–0.61)
EOSINOPHIL NFR BLD AUTO: 1 % (ref 0–6)
ERYTHROCYTE [DISTWIDTH] IN BLOOD BY AUTOMATED COUNT: 11.9 % (ref 11.6–15.1)
ETHANOL SERPL-MCNC: 278 MG/DL (ref 0–3)
GFR SERPL CREATININE-BSD FRML MDRD: 69 ML/MIN/1.73SQ M
GLUCOSE SERPL-MCNC: 134 MG/DL (ref 65–140)
GLUCOSE SERPL-MCNC: 147 MG/DL (ref 65–140)
GLUCOSE SERPL-MCNC: 68 MG/DL (ref 65–140)
HCT VFR BLD AUTO: 38.1 % (ref 36.5–49.3)
HGB BLD-MCNC: 13.8 G/DL (ref 12–17)
IMM GRANULOCYTES # BLD AUTO: 0.02 THOUSAND/UL (ref 0–0.2)
IMM GRANULOCYTES NFR BLD AUTO: 0 % (ref 0–2)
INR PPP: 0.97 (ref 0.84–1.19)
LYMPHOCYTES # BLD AUTO: 2.45 THOUSANDS/ÂΜL (ref 0.6–4.47)
LYMPHOCYTES NFR BLD AUTO: 29 % (ref 14–44)
MCH RBC QN AUTO: 31.1 PG (ref 26.8–34.3)
MCHC RBC AUTO-ENTMCNC: 36.2 G/DL (ref 31.4–37.4)
MCV RBC AUTO: 86 FL (ref 82–98)
MONOCYTES # BLD AUTO: 0.43 THOUSAND/ÂΜL (ref 0.17–1.22)
MONOCYTES NFR BLD AUTO: 5 % (ref 4–12)
NEUTROPHILS # BLD AUTO: 5.42 THOUSANDS/ÂΜL (ref 1.85–7.62)
NEUTS SEG NFR BLD AUTO: 65 % (ref 43–75)
NRBC BLD AUTO-RTO: 0 /100 WBCS
PLATELET # BLD AUTO: 231 THOUSANDS/UL (ref 149–390)
PMV BLD AUTO: 10.3 FL (ref 8.9–12.7)
POTASSIUM SERPL-SCNC: 2 MMOL/L (ref 3.5–5.3)
PROT SERPL-MCNC: 7.2 G/DL (ref 6.4–8.4)
PROTHROMBIN TIME: 12.9 SECONDS (ref 11.6–14.5)
RBC # BLD AUTO: 4.44 MILLION/UL (ref 3.88–5.62)
SODIUM SERPL-SCNC: 144 MMOL/L (ref 135–147)
WBC # BLD AUTO: 8.43 THOUSAND/UL (ref 4.31–10.16)

## 2022-12-31 RX ORDER — POTASSIUM CHLORIDE 14.9 MG/ML
20 INJECTION INTRAVENOUS ONCE
Status: COMPLETED | OUTPATIENT
Start: 2022-12-31 | End: 2023-01-01

## 2022-12-31 RX ORDER — NALOXONE HYDROCHLORIDE 1 MG/ML
1 INJECTION PARENTERAL ONCE
Status: COMPLETED | OUTPATIENT
Start: 2022-12-31 | End: 2022-12-31

## 2022-12-31 RX ORDER — POTASSIUM CHLORIDE 20 MEQ/1
60 TABLET, EXTENDED RELEASE ORAL ONCE
Status: COMPLETED | OUTPATIENT
Start: 2022-12-31 | End: 2023-01-01

## 2022-12-31 RX ORDER — DEXTROSE MONOHYDRATE 25 G/50ML
50 INJECTION, SOLUTION INTRAVENOUS ONCE
Status: COMPLETED | OUTPATIENT
Start: 2022-12-31 | End: 2022-12-31

## 2022-12-31 RX ADMIN — POTASSIUM CHLORIDE 20 MEQ: 14.9 INJECTION, SOLUTION INTRAVENOUS at 23:01

## 2022-12-31 RX ADMIN — DEXTROSE MONOHYDRATE 50 ML: 25 INJECTION, SOLUTION INTRAVENOUS at 23:22

## 2023-01-01 PROBLEM — T68.XXXA HYPOTHERMIA: Chronic | Status: ACTIVE | Noted: 2022-11-22

## 2023-01-01 PROBLEM — R19.7 DIARRHEA: Status: ACTIVE | Noted: 2023-01-01

## 2023-01-01 LAB
ANION GAP SERPL CALCULATED.3IONS-SCNC: 12 MMOL/L (ref 4–13)
ANION GAP SERPL CALCULATED.3IONS-SCNC: 8 MMOL/L (ref 4–13)
ATRIAL RATE: 72 BPM
BUN SERPL-MCNC: 9 MG/DL (ref 5–25)
BUN SERPL-MCNC: 9 MG/DL (ref 5–25)
CALCIUM SERPL-MCNC: 8.4 MG/DL (ref 8.3–10.1)
CALCIUM SERPL-MCNC: 8.6 MG/DL (ref 8.3–10.1)
CHLORIDE SERPL-SCNC: 103 MMOL/L (ref 96–108)
CHLORIDE SERPL-SCNC: 104 MMOL/L (ref 96–108)
CO2 SERPL-SCNC: 28 MMOL/L (ref 21–32)
CO2 SERPL-SCNC: 29 MMOL/L (ref 21–32)
CREAT SERPL-MCNC: 1.07 MG/DL (ref 0.6–1.3)
CREAT SERPL-MCNC: 1.17 MG/DL (ref 0.6–1.3)
GFR SERPL CREATININE-BSD FRML MDRD: 71 ML/MIN/1.73SQ M
GFR SERPL CREATININE-BSD FRML MDRD: 79 ML/MIN/1.73SQ M
GLUCOSE SERPL-MCNC: 110 MG/DL (ref 65–140)
GLUCOSE SERPL-MCNC: 140 MG/DL (ref 65–140)
GLUCOSE SERPL-MCNC: 160 MG/DL (ref 65–140)
GLUCOSE SERPL-MCNC: 176 MG/DL (ref 65–140)
GLUCOSE SERPL-MCNC: 191 MG/DL (ref 65–140)
GLUCOSE SERPL-MCNC: 212 MG/DL (ref 65–140)
GLUCOSE SERPL-MCNC: 235 MG/DL (ref 65–140)
GLUCOSE SERPL-MCNC: 257 MG/DL (ref 65–140)
GLUCOSE SERPL-MCNC: 258 MG/DL (ref 65–140)
GLUCOSE SERPL-MCNC: 320 MG/DL (ref 65–140)
GLUCOSE SERPL-MCNC: 321 MG/DL (ref 65–140)
GLUCOSE SERPL-MCNC: 359 MG/DL (ref 65–140)
GLUCOSE SERPL-MCNC: 368 MG/DL (ref 65–140)
GLUCOSE SERPL-MCNC: 426 MG/DL (ref 65–140)
GLUCOSE SERPL-MCNC: <20 MG/DL (ref 65–140)
GLUCOSE SERPL-MCNC: <20 MG/DL (ref 65–140)
MAGNESIUM SERPL-MCNC: 1.7 MG/DL (ref 1.6–2.6)
P AXIS: 78 DEGREES
POTASSIUM SERPL-SCNC: 2.3 MMOL/L (ref 3.5–5.3)
POTASSIUM SERPL-SCNC: 3.9 MMOL/L (ref 3.5–5.3)
PR INTERVAL: 166 MS
QRS AXIS: 55 DEGREES
QRSD INTERVAL: 98 MS
QT INTERVAL: 366 MS
QTC INTERVAL: 400 MS
SODIUM SERPL-SCNC: 140 MMOL/L (ref 135–147)
SODIUM SERPL-SCNC: 144 MMOL/L (ref 135–147)
T WAVE AXIS: -40 DEGREES
VENTRICULAR RATE: 72 BPM

## 2023-01-01 RX ORDER — POTASSIUM CHLORIDE 20 MEQ/1
40 TABLET, EXTENDED RELEASE ORAL ONCE
Status: DISCONTINUED | OUTPATIENT
Start: 2023-01-01 | End: 2023-01-01 | Stop reason: ALTCHOICE

## 2023-01-01 RX ORDER — FOLIC ACID 1 MG/1
1 TABLET ORAL DAILY
Status: DISCONTINUED | OUTPATIENT
Start: 2023-01-01 | End: 2023-01-02 | Stop reason: HOSPADM

## 2023-01-01 RX ORDER — LANOLIN ALCOHOL/MO/W.PET/CERES
100 CREAM (GRAM) TOPICAL DAILY
Status: DISCONTINUED | OUTPATIENT
Start: 2023-01-01 | End: 2023-01-02 | Stop reason: HOSPADM

## 2023-01-01 RX ORDER — POTASSIUM CHLORIDE 29.8 MG/ML
40 INJECTION INTRAVENOUS ONCE
Status: DISCONTINUED | OUTPATIENT
Start: 2023-01-01 | End: 2023-01-01 | Stop reason: ALTCHOICE

## 2023-01-01 RX ORDER — ACETAMINOPHEN 160 MG/5ML
650 SUSPENSION, ORAL (FINAL DOSE FORM) ORAL EVERY 4 HOURS PRN
Status: DISCONTINUED | OUTPATIENT
Start: 2023-01-01 | End: 2023-01-02 | Stop reason: HOSPADM

## 2023-01-01 RX ORDER — DEXTROSE MONOHYDRATE 25 G/50ML
50 INJECTION, SOLUTION INTRAVENOUS ONCE
Status: COMPLETED | OUTPATIENT
Start: 2023-01-01 | End: 2023-01-01

## 2023-01-01 RX ORDER — PANTOPRAZOLE SODIUM 40 MG/1
40 TABLET, DELAYED RELEASE ORAL
Status: DISCONTINUED | OUTPATIENT
Start: 2023-01-01 | End: 2023-01-02 | Stop reason: HOSPADM

## 2023-01-01 RX ORDER — INSULIN LISPRO 100 [IU]/ML
1-5 INJECTION, SOLUTION INTRAVENOUS; SUBCUTANEOUS
Status: DISCONTINUED | OUTPATIENT
Start: 2023-01-01 | End: 2023-01-01

## 2023-01-01 RX ORDER — POTASSIUM CHLORIDE 14.9 MG/ML
20 INJECTION INTRAVENOUS ONCE
Status: COMPLETED | OUTPATIENT
Start: 2023-01-01 | End: 2023-01-01

## 2023-01-01 RX ORDER — POTASSIUM CHLORIDE 20 MEQ/1
40 TABLET, EXTENDED RELEASE ORAL ONCE
Status: COMPLETED | OUTPATIENT
Start: 2023-01-01 | End: 2023-01-01

## 2023-01-01 RX ORDER — ONDANSETRON 2 MG/ML
4 INJECTION INTRAMUSCULAR; INTRAVENOUS EVERY 6 HOURS PRN
Status: DISCONTINUED | OUTPATIENT
Start: 2023-01-01 | End: 2023-01-02 | Stop reason: HOSPADM

## 2023-01-01 RX ORDER — POTASSIUM CHLORIDE, DEXTROSE MONOHYDRATE 150; 5 MG/100ML; G/100ML
75 INJECTION, SOLUTION INTRAVENOUS CONTINUOUS
Status: DISCONTINUED | OUTPATIENT
Start: 2023-01-01 | End: 2023-01-02 | Stop reason: HOSPADM

## 2023-01-01 RX ORDER — DEXTROSE MONOHYDRATE 25 G/50ML
25 INJECTION, SOLUTION INTRAVENOUS ONCE
Status: DISCONTINUED | OUTPATIENT
Start: 2023-01-01 | End: 2023-01-01

## 2023-01-01 RX ORDER — INSULIN GLARGINE 100 [IU]/ML
10 INJECTION, SOLUTION SUBCUTANEOUS ONCE
Status: COMPLETED | OUTPATIENT
Start: 2023-01-01 | End: 2023-01-01

## 2023-01-01 RX ADMIN — INSULIN GLARGINE 10 UNITS: 100 INJECTION, SOLUTION SUBCUTANEOUS at 11:47

## 2023-01-01 RX ADMIN — THIAMINE HCL TAB 100 MG 100 MG: 100 TAB at 08:26

## 2023-01-01 RX ADMIN — INSULIN LISPRO 2 UNITS: 100 INJECTION, SOLUTION INTRAVENOUS; SUBCUTANEOUS at 11:47

## 2023-01-01 RX ADMIN — Medication 12.5 MG: at 08:26

## 2023-01-01 RX ADMIN — Medication 12.5 MG: at 21:46

## 2023-01-01 RX ADMIN — INSULIN LISPRO 4 UNITS: 100 INJECTION, SOLUTION INTRAVENOUS; SUBCUTANEOUS at 08:28

## 2023-01-01 RX ADMIN — POTASSIUM CHLORIDE 20 MEQ: 14.9 INJECTION, SOLUTION INTRAVENOUS at 10:58

## 2023-01-01 RX ADMIN — PANTOPRAZOLE SODIUM 40 MG: 40 TABLET, DELAYED RELEASE ORAL at 13:16

## 2023-01-01 RX ADMIN — FOLIC ACID 1 MG: 1 TABLET ORAL at 08:26

## 2023-01-01 RX ADMIN — POTASSIUM CHLORIDE 20 MEQ: 14.9 INJECTION, SOLUTION INTRAVENOUS at 08:36

## 2023-01-01 RX ADMIN — POTASSIUM CHLORIDE 40 MEQ: 1500 TABLET, EXTENDED RELEASE ORAL at 08:26

## 2023-01-01 RX ADMIN — INSULIN LISPRO 3 UNITS: 100 INJECTION, SOLUTION INTRAVENOUS; SUBCUTANEOUS at 11:03

## 2023-01-01 RX ADMIN — DEXTROSE MONOHYDRATE 50 ML: 25 INJECTION, SOLUTION INTRAVENOUS at 13:42

## 2023-01-01 RX ADMIN — POTASSIUM CHLORIDE 60 MEQ: 1500 TABLET, EXTENDED RELEASE ORAL at 01:57

## 2023-01-01 RX ADMIN — DEXTROSE AND POTASSIUM CHLORIDE 75 ML/HR: 5; .15 SOLUTION INTRAVENOUS at 13:57

## 2023-01-01 RX ADMIN — POTASSIUM CHLORIDE 20 MEQ: 14.9 INJECTION, SOLUTION INTRAVENOUS at 01:12

## 2023-01-01 RX ADMIN — DEXTROSE MONOHYDRATE 50 ML: 25 INJECTION, SOLUTION INTRAVENOUS at 04:32

## 2023-01-01 NOTE — ASSESSMENT & PLAN NOTE
Self reported diarrhea 2* alcohol use  Check c diff enteric bacterial panel given recurrence  Continue supportive care

## 2023-01-01 NOTE — NURSING NOTE
Nurse entered patient room for Q2hr blood sugar check  Patient was very lethargic and sweating profusely  Accucheck and vitals obtained on patient  Vitals temp 97 8, pulse 87, /57, resp 20  BS <20  Provider Dr June Aguilera contacted immediately  Patient provided orange juice and evangelista crackers  Order for dextrose 50% IV placed and administered  Patient BS rechecked @ [phone] and and is currently 160  Patient is alert and resting comfortably in bed  Fluid order placed by provider and running at this time as well

## 2023-01-01 NOTE — ASSESSMENT & PLAN NOTE
Recurrent in setting of hypoglycemia  Random cortisol level 14 2 in 09/22 and tsh wnl as well as that time  No s/sx of sepsis  Continue barehugger

## 2023-01-01 NOTE — ED PROVIDER NOTES
History  Chief Complaint   Patient presents with   • Hypoglycemia - Symptomatic     Pt arrives via [facility] from home per EMS pt BS was 34 gave 250ml of d10 reports was 204 pt was only responsive to painful stimuli, pt was also given 0 8mg narcan IV  This is a 19-year-old male with a history of paroxysmal A  fib, alcohol abuse, polysubstance abuse, diabetes who presents with altered mental status  EMS was called for an unresponsive patient  His blood sugar was found to be 34 and he was given 250 cc of D10  His next blood sugar was 204 but he was only responsive to painful stimuli  EMS reported a low end-tidal CO2  EMS reported pinpoint pupils  He was given 0 8 mg of Narcan IV  Possibly became more responsive  He was brought to the emergency department for evaluation  Prior to Admission Medications   Prescriptions Last Dose Informant Patient Reported? Taking?    Blood Glucose Monitoring Suppl (Accu-Chek Guide) w/Device KIT   No No   Sig: Use 3 (three) times a day   Insulin Pen Needle (Unifine Pentips) 32G X 4 MM MISC   No No   Sig: Inject as directed daily   Lancets (accu-chek multiclix) lancets   No No   Sig: Check blood sugar 3 times a day   glucose blood (Accu-Chek Guide) test strip   No No   Sig: Check blood sugar 3 times a day   lisinopril-hydrochlorothiazide (PRINZIDE,ZESTORETIC) 20-12 5 MG per tablet   No No   Sig: Take 2 tablets by mouth daily   metoprolol tartrate (LOPRESSOR) 25 mg tablet   No No   Sig: Take 0 5 tablets (12 5 mg total) by mouth every 12 (twelve) hours      Facility-Administered Medications: None       Past Medical History:   Diagnosis Date   • Alcohol abuse    • Chronic pancreatitis (HCC)    • Diabetes mellitus ([facility] [address] )     Type 2   • Pancreatitis    • Paroxysmal A-fib (HCC)    • Thrombocytopenia ([facility] [address] )        Past Surgical History:   Procedure Laterality Date   • INCISION AND DRAINAGE OF WOUND N/A 8/16/2020    Procedure: INCISION AND DRAINAGE (I&D) HEAD/FACE;  Surgeon: Kat Herrera Agnes Can DMD;  Location: [facility];  Service: Maxillofacial   • IR BIOPSY BONE MARROW  2/7/2019   • REMOVAL OF IMPACTED TOOTH - COMPLETELY BONY N/A 8/16/2020    Procedure: EXTRACTION TEETH MULTIPLE #2, 3;  Surgeon: Sascha Loco DMD;  Location: [facility];  Service: Maxillofacial       Family History   Problem Relation Age of Onset   • Diabetes Mother    • Hypertension Mother    • Hyperlipidemia Father      I have reviewed and agree with the history as documented  E-Cigarette/Vaping     E-Cigarette/Vaping Substances     Social History     Tobacco Use   • Smoking status: Former   • Smokeless tobacco: Never   Substance Use Topics   • Alcohol use: Yes   • Drug use: Yes     Types: Cocaine       Review of Systems   Unable to perform ROS: Patient unresponsive       Physical Exam  Physical Exam  Vitals and nursing note reviewed  Constitutional:       General: He is not in acute distress  Appearance: He is well-developed  HENT:      Head: Normocephalic and atraumatic  Mouth/Throat:      Lips: Pink  Mouth: Mucous membranes are moist    Eyes:      General: Lids are normal       Extraocular Movements: Extraocular movements intact  Conjunctiva/sclera: Conjunctivae normal       Pupils: Pupils are equal, round, and reactive to light  Cardiovascular:      Rate and Rhythm: Normal rate and regular rhythm  Heart sounds: Normal heart sounds  No murmur heard  Pulmonary:      Effort: Pulmonary effort is normal       Breath sounds: Normal breath sounds  Abdominal:      General: There is no distension  Palpations: Abdomen is soft  Tenderness: There is no abdominal tenderness  There is no guarding or rebound  Musculoskeletal:         General: No swelling  Cervical back: Full passive range of motion without pain, normal range of motion and neck supple  Skin:     General: Skin is warm  Capillary Refill: Capillary refill takes less than 2 seconds  Findings: No rash  Neurological:      General: No focal deficit present  Mental Status: He is alert  Comments: Patient opens his eyes to loud voice  Localizes to sternal rub  (Eventually followed commands for the nurse)  No verbal response  5 out of 5 strength in all extremities  No facial droop     Psychiatric:         Mood and Affect: Mood normal          Speech: Speech normal          Behavior: Behavior normal          Vital Signs  ED Triage Vitals   Temperature Pulse Respirations Blood Pressure SpO2   12/31/22 2149 12/31/22 2130 12/31/22 2130 12/31/22 2130 12/31/22 2130   (!) 93 2 °F (34 °C) 66 14 145/78 100 %      Temp Source Heart Rate Source Patient Position - Orthostatic VS BP Location FiO2 (%)   12/31/22 2149 12/31/22 2130 12/31/22 2130 12/31/22 2130 --   Rectal Monitor Lying Right arm       Pain Score       --                  Vitals:    01/01/23 0130 01/01/23 0215 01/01/23 0315 01/01/23 0415   BP: 128/75 139/68 126/78 135/65   Pulse: 84 88 86 68   Patient Position - Orthostatic VS: Lying Sitting Sitting Lying         Visual Acuity  Visual Acuity    Flowsheet Row Most Recent Value   L Pupil Size (mm) 2   R Pupil Size (mm) 2          ED Medications  Medications   naloxone (FOR EMS ONLY) (NARCAN) 2 MG/2ML injection 2 mg (0 mg Does not apply Given to EMS 12/31/22 2131)   potassium chloride (K-DUR,KLOR-CON) CR tablet 60 mEq (60 mEq Oral Given 1/1/23 0157)   potassium chloride 20 mEq IVPB (premix) (0 mEq Intravenous Stopped 1/1/23 0320)   potassium chloride 20 mEq IVPB (premix) (0 mEq Intravenous Stopped 1/1/23 0112)   dextrose 50 % IV solution 50 mL (50 mL Intravenous Given 12/31/22 2322)   dextrose 50 % IV solution 50 mL (50 mL Intravenous Given 1/1/23 0432)       Diagnostic Studies  Results Reviewed     Procedure Component Value Units Date/Time    Magnesium [[id number]]     Lab Status: No result Specimen: Blood     Basic metabolic panel [[id number]]  (Abnormal) Collected: 01/01/23 0437    Lab Status: Final result Specimen: Blood from Arm, Left Updated: 01/01/23 0513     Sodium 144 mmol/L      Potassium 2 3 mmol/L      Chloride 104 mmol/L      CO2 28 mmol/L      ANION GAP 12 mmol/L      BUN 9 mg/dL      Creatinine 1 07 mg/dL      Glucose 320 mg/dL      Calcium 8 4 mg/dL      eGFR 79 ml/min/1 73sq m     Narrative:      [place] guidelines for Chronic Kidney Disease (CKD):   •  Stage 1 with normal or high GFR (GFR > 90 mL/min/1 73 square meters)  •  Stage 2 Mild CKD (GFR = 60-89 mL/min/1 73 square meters)  •  Stage 3A Moderate CKD (GFR = 45-59 mL/min/1 73 square meters)  •  Stage 3B Moderate CKD (GFR = 30-44 mL/min/1 73 square meters)  •  Stage 4 Severe CKD (GFR = 15-29 mL/min/1 73 square meters)  •  Stage 5 End Stage CKD (GFR <15 mL/min/1 73 square meters)  Note: GFR calculation is accurate only with a steady state creatinine    Fingerstick Glucose (POCT) [[id number]]  (Abnormal) Collected: 01/01/23 0454    Lab Status: Final result Updated: 01/01/23 0454     POC Glucose 191 mg/dl     Fingerstick Glucose (POCT) [[id number]]  (Abnormal) Collected: 01/01/23 0430    Lab Status: Final result Updated: 01/01/23 0431     POC Glucose <20 mg/dl     Fingerstick Glucose (POCT) [[id number]]  (Abnormal) Collected: 01/01/23 0002    Lab Status: Final result Updated: 01/01/23 0005     POC Glucose 176 mg/dl     Comprehensive metabolic panel [[id number]]  (Abnormal) Collected: 12/31/22 2138    Lab Status: Final result Specimen: Blood from Arm, Left Updated: 12/31/22 2312     Sodium 144 mmol/L      Potassium 2 0 mmol/L      Chloride 103 mmol/L      CO2 29 mmol/L      ANION GAP 12 mmol/L      BUN 9 mg/dL      Creatinine 1 20 mg/dL      Glucose 147 mg/dL      Calcium 8 5 mg/dL      Corrected Calcium 9 0 mg/dL      AST 23 U/L      ALT 22 U/L      Alkaline Phosphatase 90 U/L      Total Protein 7 2 g/dL      Albumin 3 4 g/dL      Total Bilirubin 0 39 mg/dL      eGFR 69 ml/min/1 73sq m     Narrative:      [employer] guidelines for Chronic Kidney Disease (CKD):   •  Stage 1 with normal or high GFR (GFR > 90 mL/min/1 73 square meters)  •  Stage 2 Mild CKD (GFR = 60-89 mL/min/1 73 square meters)  •  Stage 3A Moderate CKD (GFR = 45-59 mL/min/1 73 square meters)  •  Stage 3B Moderate CKD (GFR = 30-44 mL/min/1 73 square meters)  •  Stage 4 Severe CKD (GFR = 15-29 mL/min/1 73 square meters)  •  Stage 5 End Stage CKD (GFR <15 mL/min/1 73 square meters)  Note: GFR calculation is accurate only with a steady state creatinine    Fingerstick Glucose (POCT) [[id number]]  (Normal) Collected: 12/31/22 2257    Lab Status: Final result Updated: 12/31/22 2258     POC Glucose 68 mg/dl     Ammonia [[id number]]  (Normal) Collected: 12/31/22 2138    Lab Status: Final result Specimen: Blood from Arm, Left Updated: 12/31/22 2235     Ammonia 22 umol/L     Ethanol [[id number]]  (Abnormal) Collected: 12/31/22 2138    Lab Status: Final result Specimen: Blood from Arm, Left Updated: 12/31/22 2224     Ethanol Lvl 278 mg/dL     Protime-INR [[id number]]  (Normal) Collected: 12/31/22 2138    Lab Status: Final result Specimen: Blood from Arm, Left Updated: 12/31/22 2222     Protime 12 9 seconds      INR 0 97    APTT [[id number]]  (Normal) Collected: 12/31/22 2138    Lab Status: Final result Specimen: Blood from Arm, Left Updated: 12/31/22 2222     PTT 25 seconds     CBC and differential [[id number]] Collected: 12/31/22 2138    Lab Status: Final result Specimen: Blood from Arm, Left Updated: 12/31/22 2152     WBC 8 43 Thousand/uL      RBC 4 44 Million/uL      Hemoglobin 13 8 g/dL      Hematocrit 38 1 %      MCV 86 fL      MCH 31 1 pg      MCHC 36 2 g/dL      RDW 11 9 %      MPV 10 3 fL      Platelets 092 Thousands/uL      nRBC 0 /100 WBCs      Neutrophils Relative 65 %      Immat GRANS % 0 %      Lymphocytes Relative 29 %      Monocytes Relative 5 %      Eosinophils Relative 1 %      Basophils Relative 0 %      Neutrophils Absolute 5 42 Thousands/µL Immature Grans Absolute 0 02 Thousand/uL      Lymphocytes Absolute 2 45 Thousands/µL      Monocytes Absolute 0 43 Thousand/µL      Eosinophils Absolute 0 09 Thousand/µL      Basophils Absolute 0 02 Thousands/µL     Fingerstick Glucose (POCT) [[id number]]  (Normal) Collected: 12/31/22 2130    Lab Status: Final result Updated: 12/31/22 2131     POC Glucose 134 mg/dl                  CT head without contrast   Final Result by Rocio Erwin MD (12/31 2254)      No acute intracranial abnormality  Workstation performed: [id number]         XR chest 1 view portable   ED Interpretation by Booker Moran MD (12/31 2152)   No acute cardiopulmonary disease as interpreted by myself  Final Result by Layla Bailey MD (59/26 0100)      No acute cardiopulmonary disease        Findings are stable            Workstation performed: [id number]                    Procedures  ECG 12 Lead Documentation Only    Date/Time: 12/31/2022 9:50 PM  Performed by: Booker Moran MD  Authorized by: Booker Moran MD     ECG reviewed by me, the ED Provider: yes    Patient location:  ED  Previous ECG:     Previous ECG:  Compared to current    Comparison ECG info:  12/8/22    Similarity:  No change    Comparison to cardiac monitor: Yes    Interpretation:     Interpretation: non-specific    Rate:     ECG rate:  72    ECG rate assessment: normal    Rhythm:     Rhythm: sinus rhythm    Ectopy:     Ectopy: none    QRS:     QRS axis:  Normal    QRS intervals:  Normal  Conduction:     Conduction: normal    ST segments:     ST segments:  Normal  T waves:     T waves: non-specific      CriticalCare Time  Performed by: Booker Moran MD  Authorized by: Booker Moran MD     Critical care provider statement:     Critical care time (minutes):  45    Critical care start time:  12/31/2022 11:00 PM    Critical care end time:  12/31/2022 11:45 PM    Critical care time was exclusive of:  Separately billable procedures and treating other patients    Critical care was necessary to treat or prevent imminent or life-threatening deterioration of the following conditions:  Endocrine crisis and metabolic crisis    Critical care was time spent personally by me on the following activities:  Obtaining history from patient or surrogate, development of treatment plan with patient or surrogate, evaluation of patient's response to treatment, examination of patient, review of old charts, re-evaluation of patient's condition and ordering and review of laboratory studies    I assumed direction of critical care for this patient from another provider in my specialty: no               ED Course  ED Course as of 01/01/23 0518   Sat Dec 31, 2022   2318 Potassium(!!): 2 0  Will replete   2318 POC Glucose: 68  Will give 50cc D50   Sun Jan 01, 2023   0010 Temperature: 97 6 °F (36 4 °C)  Warming blanket removed  3791 Patient awake in bed  GCS 15  Playing on phone and watching television  Repeat BMP ordered for 0430  MDM  Number of Diagnoses or Management Options  Diagnosis management comments: We will check labs, EKG, chest x-ray  CT head  Disposition pending results          Disposition  Final diagnoses:   Alcoholic intoxication without complication ([facility] [address] )   Hypoglycemia   Hypokalemia     Time reflects when diagnosis was documented in both MDM as applicable and the Disposition within this note     Time User Action Codes Description Comment    12/31/2022 11:43 PM Kasia Nails Add [O37 829] Alcoholic intoxication without complication ([facility] [address] )     50/24/8349 11:43 PM Kasia Nails Add [E16 2] Hypoglycemia     12/31/2022 11:43 PM Kasia Nails Add [E87 6] Hypokalemia       ED Disposition     ED Disposition   Admit    Condition   Stable    Date/Time   Sun Jan 1, 2023  4:50 AM    Comment              Follow-up Information    None         Patient's Medications   Discharge Prescriptions    No medications on file       No discharge procedures on file      PDMP Review       Value Time User    PDMP Reviewed  Yes 8/18/2020 11:39 AM Sasha Panchal MD          ED Provider  Electronically Signed by           Brian Woodson MD  01/01/23 4527

## 2023-01-01 NOTE — ASSESSMENT & PLAN NOTE
Severe hypokalemia of 2 0 only improved to 2 3 w/60meq PO KCl and 40meq IV KCl    Check mag, continue to replete and monitor on tele

## 2023-01-01 NOTE — ASSESSMENT & PLAN NOTE
Lab Results   Component Value Date    HGBA1C 6 7 (H) 07/30/2022       Recent Labs     12/31/22  2257 01/01/23  0002 01/01/23  0430 01/01/23  0454   POCGLU 68 176* <20* 191*       Blood Sugar Average: Last 72 hrs:  (P) 142 25   W/recurrent admissions for hypoglycemia and now refractory hypoglycemia  Previously was recommended to downgrade to lantus 10 units but had been going back upwards to 26 units of lantus  Declined recommendations to go to lantus w/ssi or oral antihyperglycemics  Pt presently is intoxicated and reports he took 'too much' but does not recall how much he took  -again admitted for hypoglycemia refractory to multiple rounds of d50 in ed   -continue to monitor, treat associated hypokalemia    If worsens again will likely need d10 infusion

## 2023-01-01 NOTE — H&P
[address] 1970, 46 y o  male MRN: [id number]  Unit/Bed#: ED 23 Encounter: [id number]  Primary Care Provider: No primary care provider on file  Date and time admitted to hospital: 12/31/2022  9:24 PM    * Type 2 diabetes mellitus with stage 3 chronic kidney disease, with long-term current use of insulin [facility])  Assessment & Plan  Lab Results   Component Value Date    HGBA1C 6 7 (H) 07/30/2022       Recent Labs     12/31/22  2257 01/01/23  0002 01/01/23  0430 01/01/23  0454   POCGLU 68 176* <20* 191*       Blood Sugar Average: Last 72 hrs:  (P) 142 25   W/recurrent admissions for hypoglycemia and now refractory hypoglycemia  Previously was recommended to downgrade to lantus 10 units but had been going back upwards to 26 units of lantus  Declined recommendations to go to lantus w/ssi or oral antihyperglycemics  Pt presently is intoxicated and reports he took 'too much' but does not recall how much he took  -again admitted for hypoglycemia refractory to multiple rounds of d50 in ed   -continue to monitor, treat associated hypokalemia  If worsens again will likely need d10 infusion    Hypokalemia  Assessment & Plan  Severe hypokalemia of 2 0 only improved to 2 3 w/60meq PO KCl and 40meq IV KCl  Check mag, continue to replete and monitor on tele    Diarrhea  Assessment & Plan  Self reported diarrhea 2* alcohol use  Check c diff enteric bacterial panel given recurrence  Continue supportive care    Hypothermia  Assessment & Plan  Recurrent in setting of hypoglycemia  Random cortisol level 14 2 in 09/22 and tsh wnl as well as that time  No s/sx of sepsis  Continue barehugger     Alcohol intoxication in active alcoholic with complication [facility])  Assessment & Plan  Alcohol serum level 278  Monitor for s/sx of withdrawal  Start thiamine/folic acid/mvi    Essential hypertension  Assessment & Plan  Continue lopressor    Hold lisinopril/hctz with hypokalemia      VTE Prophylaxis: Enoxaparin (Lovenox)  / sequential compression device   Code Status: fc  POLST: There is no POLST form on file for this patient (pre-hospital)  Discussion with family:     Anticipated Length of Stay:  Patient will be admitted on an Observation basis with an anticipated length of stay of  Less than 2 midnights  Justification for Hospital Stay: dm 2 w/long term insulin use w/hypoglycemia    Total Time for Visit, including Counseling / Coordination of Care: 45 minutes  Greater than 50% of this total time spent on direct patient counseling and coordination of care  Chief Complaint:   ams    History of Present Illness:    Marin Rand is a 46 y o  male who presents with pmh of afib, polysubstance abuse, dm who comes to hospital for ams  hpi constructed by d/w pt, review of emr and d/w ed staff  Pt Nepali speaking only  Conversation occurred between myself/pt in [address] [facility]  Pt has had multiple episodes of this previously requiring hospitalization ultimately felt to be due to over treatment w/his insulin and decreased po intake  Dara Soulier He was noted during prior admissions to be taking upwards of 26 units of lantus when he was prescribed 10 units of lantus daily  He has declined ssi previously as well as oral antihyperglycemics  On the day of admission pt is a somewhat poor historian  He is alert but intoxicated  He does not know how much insulin he took but is certain it is larger dose than he should have taken  He denies any cp nor sob  No abd pain  Does have diarrhea for 2- 3 days which is nonbloody nonbilious  In ed he was given multiple round of dextrose and 100meq of Potassium (40 iv and 60meq PO) with persistent hypoglycemia/and hypokalemia and subsequent development of hypothermia  We all admit for hypoglycemia and hypothermia 2* insulin use (over use)  Review of Systems:    Review of Systems   Respiratory: Negative for shortness of breath      Cardiovascular: Negative for chest pain and palpitations  Gastrointestinal: Positive for diarrhea  Psychiatric/Behavioral: Positive for confusion  All other systems reviewed and are negative  Past Medical and Surgical History:     Past Medical History:   Diagnosis Date   • Alcohol abuse    • Chronic pancreatitis ([facility] )    • Diabetes mellitus ([facility] )     Type 2   • Pancreatitis    • Paroxysmal A-fib ([facility] )    • Thrombocytopenia ([facility] )        Past Surgical History:   Procedure Laterality Date   • INCISION AND DRAINAGE OF WOUND N/A 8/16/2020    Procedure: INCISION AND DRAINAGE (I&D) HEAD/FACE;  Surgeon: Coleen Mckeon DMD;  Location: [facility];  Service: Maxillofacial   • IR BIOPSY BONE MARROW  2/7/2019   • REMOVAL OF IMPACTED TOOTH - COMPLETELY BONY N/A 8/16/2020    Procedure: EXTRACTION TEETH MULTIPLE #2, 3;  Surgeon: Coleen Mckeon DMD;  Location: [facility];  Service: Maxillofacial       Meds/Allergies:    Prior to Admission medications    Medication Sig Start Date End Date Taking? Authorizing Provider   Blood Glucose Monitoring Suppl (Accu-Chek Guide) w/Device KIT Use 3 (three) times a day 9/4/22   Maria Yang MD   glucose blood (Accu-Chek Guide) test strip Check blood sugar 3 times a day 9/4/22   Maria Yang MD   Insulin Pen Needle (Unifine Pentips) 32G X 4 MM MISC Inject as directed daily 9/4/22 10/24/22  Maria Yang MD   Lancets (accu-chek multiclix) lancets Check blood sugar 3 times a day 9/4/22   Maria Yang MD   lisinopril-hydrochlorothiazide [facility]) 20-12 5 MG per tablet Take 2 tablets by mouth daily 11/24/22 12/24/22  Marilee Angel MD   metoprolol tartrate (LOPRESSOR) 25 mg tablet Take 0 5 tablets (12 5 mg total) by mouth every 12 (twelve) hours 7/30/22 8/29/22  Susan Warren MD   metFORMIN (GLUCOPHAGE) 500 mg tablet Take 1 tablet (500 mg total) by mouth 2 (two) times a day with meals 7/30/22 7/30/22  Susan Warren MD     I have reviewed home medications with patient personally      Allergies: No Known Allergies    Social History:     Marital Status: Single   Occupation:   Patient Pre-hospital Living Situation:   Patient Pre-hospital Level of Mobility:   Patient Pre-hospital Diet Restrictions:   Substance Use History:   Social History     Substance and Sexual Activity   Alcohol Use Yes     Social History     Tobacco Use   Smoking Status Former   Smokeless Tobacco Never     Social History     Substance and Sexual Activity   Drug Use Yes   • Types: Cocaine       Family History:    Family History   Problem Relation Age of Onset   • Diabetes Mother    • Hypertension Mother    • Hyperlipidemia Father        Physical Exam:     Vitals:   Blood Pressure: 132/81 (01/01/23 0628)  Pulse: 89 (01/01/23 0628)  Temperature: (!) 94 7 °F (34 8 °C) (01/01/23 0628)  Temp Source: Rectal (01/01/23 3484)  Respirations: 18 (01/01/23 0628)  Weight - Scale: 61 7 kg (136 lb 0 4 oz) (12/31/22 2136)  SpO2: 100 % (01/01/23 5105)    Physical Exam  Vitals reviewed  Constitutional:       General: He is not in acute distress  Appearance: He is ill-appearing and diaphoretic  He is not toxic-appearing  HENT:      Head: Normocephalic and atraumatic  Right Ear: External ear normal       Left Ear: External ear normal       Nose: Nose normal    Eyes:      Extraocular Movements: Extraocular movements intact  Cardiovascular:      Rate and Rhythm: Normal rate and regular rhythm  Heart sounds: No murmur heard  No friction rub  No gallop  Pulmonary:      Breath sounds: No wheezing, rhonchi or rales  Abdominal:      General: There is no distension  Palpations: Abdomen is soft  There is no mass  Tenderness: There is no abdominal tenderness  There is no guarding or rebound  Hernia: No hernia is present  Musculoskeletal:      Cervical back: Normal range of motion  Right lower leg: No edema  Left lower leg: No edema  Skin:     General: Skin is warm  Neurological:      Mental Status: He is alert   Mental status is at baseline  Psychiatric:         Mood and Affect: Mood normal          (  Be Sure to Include Physical Exam: Delete this entire line when you have entered your exam)    Additional Data:     Lab Results: I have personally reviewed pertinent reports  Results from last 7 days   Lab Units 12/31/22 2138   WBC Thousand/uL 8 43   HEMOGLOBIN g/dL 13 8   HEMATOCRIT % 38 1   PLATELETS Thousands/uL 231   NEUTROS PCT % 65   LYMPHS PCT % 29   MONOS PCT % 5   EOS PCT % 1     Results from last 7 days   Lab Units 01/01/23  0437 12/31/22  2138   SODIUM mmol/L 144 144   POTASSIUM mmol/L 2 3* 2 0*   CHLORIDE mmol/L 104 103   CO2 mmol/L 28 29   BUN mg/dL 9 9   CREATININE mg/dL 1 07 1 20   ANION GAP mmol/L 12 12   CALCIUM mg/dL 8 4 8 5   ALBUMIN g/dL  --  3 4*   TOTAL BILIRUBIN mg/dL  --  0 39   ALK PHOS U/L  --  90   ALT U/L  --  22   AST U/L  --  23   GLUCOSE RANDOM mg/dL 320* 147*     Results from last 7 days   Lab Units 12/31/22  2138   INR  0 97     Results from last 7 days   Lab Units 01/01/23  0605 01/01/23  0454 01/01/23  0430 01/01/23  0002 12/31/22  2257 12/31/22  2130   POC GLUCOSE mg/dl 321* 191* <20* 176* 68 134               Imaging: I have personally reviewed pertinent reports  CT head without contrast   Final Result by Yamileth Hinkle MD (12/31 2254)      No acute intracranial abnormality  Workstation performed: [id number]         XR chest 1 view portable   ED Interpretation by Javed Flores MD (12/31 2152)   No acute cardiopulmonary disease as interpreted by myself  Final Result by Angelique Sheffield MD (04/25 1848)      No acute cardiopulmonary disease  Findings are stable            Workstation performed: [id number]             EKG, Pathology, and Other Studies Reviewed on Admission:   · EKG:     Allscripts / Epic Records Reviewed: Yes     ** Please Note: This note has been constructed using a voice recognition system   **

## 2023-01-02 VITALS
HEIGHT: 69 IN | OXYGEN SATURATION: 95 % | BODY MASS INDEX: 20.34 KG/M2 | WEIGHT: 137.35 LBS | TEMPERATURE: 98.1 F | DIASTOLIC BLOOD PRESSURE: 82 MMHG | RESPIRATION RATE: 19 BRPM | HEART RATE: 61 BPM | SYSTOLIC BLOOD PRESSURE: 153 MMHG

## 2023-01-02 LAB
ANION GAP SERPL CALCULATED.3IONS-SCNC: 6 MMOL/L (ref 4–13)
ANION GAP SERPL CALCULATED.3IONS-SCNC: 8 MMOL/L (ref 4–13)
BASOPHILS # BLD AUTO: 0.03 THOUSANDS/ÂΜL (ref 0–0.1)
BASOPHILS NFR BLD AUTO: 0 % (ref 0–1)
BUN SERPL-MCNC: 7 MG/DL (ref 5–25)
BUN SERPL-MCNC: 9 MG/DL (ref 5–25)
CALCIUM SERPL-MCNC: 8.4 MG/DL (ref 8.3–10.1)
CALCIUM SERPL-MCNC: 8.4 MG/DL (ref 8.3–10.1)
CHLORIDE SERPL-SCNC: 100 MMOL/L (ref 96–108)
CHLORIDE SERPL-SCNC: 100 MMOL/L (ref 96–108)
CO2 SERPL-SCNC: 26 MMOL/L (ref 21–32)
CO2 SERPL-SCNC: 28 MMOL/L (ref 21–32)
CREAT SERPL-MCNC: 1 MG/DL (ref 0.6–1.3)
CREAT SERPL-MCNC: 1.13 MG/DL (ref 0.6–1.3)
EOSINOPHIL # BLD AUTO: 0.11 THOUSAND/ÂΜL (ref 0–0.61)
EOSINOPHIL NFR BLD AUTO: 1 % (ref 0–6)
ERYTHROCYTE [DISTWIDTH] IN BLOOD BY AUTOMATED COUNT: 12.2 % (ref 11.6–15.1)
GFR SERPL CREATININE-BSD FRML MDRD: 74 ML/MIN/1.73SQ M
GFR SERPL CREATININE-BSD FRML MDRD: 86 ML/MIN/1.73SQ M
GLUCOSE SERPL-MCNC: 165 MG/DL (ref 65–140)
GLUCOSE SERPL-MCNC: 178 MG/DL (ref 65–140)
GLUCOSE SERPL-MCNC: 195 MG/DL (ref 65–140)
GLUCOSE SERPL-MCNC: 233 MG/DL (ref 65–140)
GLUCOSE SERPL-MCNC: 249 MG/DL (ref 65–140)
GLUCOSE SERPL-MCNC: 254 MG/DL (ref 65–140)
GLUCOSE SERPL-MCNC: 306 MG/DL (ref 65–140)
GLUCOSE SERPL-MCNC: 318 MG/DL (ref 65–140)
HCT VFR BLD AUTO: 37.5 % (ref 36.5–49.3)
HGB BLD-MCNC: 13 G/DL (ref 12–17)
IMM GRANULOCYTES # BLD AUTO: 0.03 THOUSAND/UL (ref 0–0.2)
IMM GRANULOCYTES NFR BLD AUTO: 0 % (ref 0–2)
LYMPHOCYTES # BLD AUTO: 2.36 THOUSANDS/ÂΜL (ref 0.6–4.47)
LYMPHOCYTES NFR BLD AUTO: 22 % (ref 14–44)
MAGNESIUM SERPL-MCNC: 1.5 MG/DL (ref 1.6–2.6)
MCH RBC QN AUTO: 31.1 PG (ref 26.8–34.3)
MCHC RBC AUTO-ENTMCNC: 34.7 G/DL (ref 31.4–37.4)
MCV RBC AUTO: 90 FL (ref 82–98)
MONOCYTES # BLD AUTO: 0.8 THOUSAND/ÂΜL (ref 0.17–1.22)
MONOCYTES NFR BLD AUTO: 8 % (ref 4–12)
NEUTROPHILS # BLD AUTO: 7.25 THOUSANDS/ÂΜL (ref 1.85–7.62)
NEUTS SEG NFR BLD AUTO: 69 % (ref 43–75)
NRBC BLD AUTO-RTO: 0 /100 WBCS
PLATELET # BLD AUTO: 195 THOUSANDS/UL (ref 149–390)
PMV BLD AUTO: 11.7 FL (ref 8.9–12.7)
POTASSIUM SERPL-SCNC: 3.7 MMOL/L (ref 3.5–5.3)
POTASSIUM SERPL-SCNC: 4.4 MMOL/L (ref 3.5–5.3)
RBC # BLD AUTO: 4.18 MILLION/UL (ref 3.88–5.62)
SODIUM SERPL-SCNC: 134 MMOL/L (ref 135–147)
SODIUM SERPL-SCNC: 134 MMOL/L (ref 135–147)
WBC # BLD AUTO: 10.58 THOUSAND/UL (ref 4.31–10.16)

## 2023-01-02 RX ORDER — THIAMINE MONONITRATE (VIT B1) 100 MG
100 TABLET ORAL DAILY
Qty: 30 TABLET | Refills: 0 | Status: SHIPPED | OUTPATIENT
Start: 2023-01-03

## 2023-01-02 RX ORDER — INSULIN LISPRO 100 [IU]/ML
1-5 INJECTION, SOLUTION INTRAVENOUS; SUBCUTANEOUS
Status: DISCONTINUED | OUTPATIENT
Start: 2023-01-02 | End: 2023-01-02 | Stop reason: HOSPADM

## 2023-01-02 RX ADMIN — Medication 12.5 MG: at 08:16

## 2023-01-02 RX ADMIN — FOLIC ACID 1 MG: 1 TABLET ORAL at 08:16

## 2023-01-02 RX ADMIN — PANTOPRAZOLE SODIUM 40 MG: 40 TABLET, DELAYED RELEASE ORAL at 07:39

## 2023-01-02 RX ADMIN — ONDANSETRON 4 MG: 2 INJECTION INTRAMUSCULAR; INTRAVENOUS at 01:38

## 2023-01-02 RX ADMIN — THIAMINE HCL TAB 100 MG 100 MG: 100 TAB at 08:17

## 2023-01-02 RX ADMIN — INSULIN LISPRO 3 UNITS: 100 INJECTION, SOLUTION INTRAVENOUS; SUBCUTANEOUS at 11:33

## 2023-01-02 NOTE — PLAN OF CARE
Problem: PAIN - ADULT  Goal: Verbalizes/displays adequate comfort level or baseline comfort level  Description: Interventions:  - Encourage patient to monitor pain and request assistance  - Assess pain using appropriate pain scale  - Administer analgesics based on type and severity of pain and evaluate response  - Implement non-pharmacological measures as appropriate and evaluate response  - Consider cultural and social influences on pain and pain management  - Notify physician/advanced practitioner if interventions unsuccessful or patient reports new pain  Outcome: Progressing     Problem: GENITOURINARY - ADULT  Goal: Maintains or returns to baseline urinary function  Description: INTERVENTIONS:  - Assess urinary function  - Encourage oral fluids to ensure adequate hydration if ordered  - Administer IV fluids as ordered to ensure adequate hydration  - Administer ordered medications as needed  - Offer frequent toileting  - Follow urinary retention protocol if ordered  Outcome: Progressing     Problem: METABOLIC, FLUID AND ELECTROLYTES - ADULT  Goal: Electrolytes maintained within normal limits  Description: INTERVENTIONS:  - Monitor labs and assess patient for signs and symptoms of electrolyte imbalances  - Administer electrolyte replacement as ordered  - Monitor response to electrolyte replacements, including repeat lab results as appropriate  - Instruct patient on fluid and nutrition as appropriate  Outcome: Progressing  Goal: Fluid balance maintained  Description: INTERVENTIONS:  - Monitor labs   - Monitor I/O and WT  - Instruct patient on fluid and nutrition as appropriate  - Assess for signs & symptoms of volume excess or deficit  Outcome: Progressing  Goal: Glucose maintained within target range  Description: INTERVENTIONS:  - Monitor Blood Glucose as ordered  - Assess for signs and symptoms of hyperglycemia and hypoglycemia  - Administer ordered medications to maintain glucose within target range  - Assess nutritional intake and initiate nutrition service referral as needed  Outcome: Progressing     Problem: HEMATOLOGIC - ADULT  Goal: Maintains hematologic stability  Description: INTERVENTIONS  - Assess for signs and symptoms of bleeding or hemorrhage  - Monitor labs  - Administer supportive blood products/factors as ordered and appropriate  Outcome: Progressing

## 2023-01-02 NOTE — PLAN OF CARE
Problem: PAIN - ADULT  Goal: Verbalizes/displays adequate comfort level or baseline comfort level  Description: Interventions:  - Encourage patient to monitor pain and request assistance  - Assess pain using appropriate pain scale  - Administer analgesics based on type and severity of pain and evaluate response  - Implement non-pharmacological measures as appropriate and evaluate response  - Consider cultural and social influences on pain and pain management  - Notify physician/advanced practitioner if interventions unsuccessful or patient reports new pain  Outcome: Adequate for Discharge     Problem: GENITOURINARY - ADULT  Goal: Maintains or returns to baseline urinary function  Description: INTERVENTIONS:  - Assess urinary function  - Encourage oral fluids to ensure adequate hydration if ordered  - Administer IV fluids as ordered to ensure adequate hydration  - Administer ordered medications as needed  - Offer frequent toileting  - Follow urinary retention protocol if ordered  Outcome: Adequate for Discharge     Problem: METABOLIC, FLUID AND ELECTROLYTES - ADULT  Goal: Electrolytes maintained within normal limits  Description: INTERVENTIONS:  - Monitor labs and assess patient for signs and symptoms of electrolyte imbalances  - Administer electrolyte replacement as ordered  - Monitor response to electrolyte replacements, including repeat lab results as appropriate  - Instruct patient on fluid and nutrition as appropriate  1/2/2023 1457 by Tiarra Ruiz RN  Outcome: Adequate for Discharge  1/2/2023 1237 by Tiarra Ruiz RN  Outcome: Progressing  Goal: Fluid balance maintained  Description: INTERVENTIONS:  - Monitor labs   - Monitor I/O and WT  - Instruct patient on fluid and nutrition as appropriate  - Assess for signs & symptoms of volume excess or deficit  Outcome: Adequate for Discharge  Goal: Glucose maintained within target range  Description: INTERVENTIONS:  - Monitor Blood Glucose as ordered  - Assess for signs and symptoms of hyperglycemia and hypoglycemia  - Administer ordered medications to maintain glucose within target range  - Assess nutritional intake and initiate nutrition service referral as needed  Outcome: Adequate for Discharge     Problem: HEMATOLOGIC - ADULT  Goal: Maintains hematologic stability  Description: INTERVENTIONS  - Assess for signs and symptoms of bleeding or hemorrhage  - Monitor labs  - Administer supportive blood products/factors as ordered and appropriate  Outcome: Adequate for Discharge     Problem: Potential for Falls  Goal: Patient will remain free of falls  Description: INTERVENTIONS:  - Educate patient/family on patient safety including physical limitations  - Instruct patient to call for assistance with activity   - Consult OT/PT to assist with strengthening/mobility   - Keep Call bell within reach  - Keep bed low and locked with side rails adjusted as appropriate  - Keep care items and personal belongings within reach  - Initiate and maintain comfort rounds  - Make Fall Risk Sign visible to staff  - Offer Toileting every Hours, in advance of need  - Initiate/Maintain alarm  - Obtain necessary fall risk management equipment:   - Apply yellow socks and bracelet for high fall risk patients  - Consider moving patient to room near nurses station  Outcome: Adequate for Discharge

## 2023-01-02 NOTE — PROGRESS NOTES
Spoke with interpretor [id number], patient states he feels fine and is requesting to go home, provider notified

## 2023-01-02 NOTE — ASSESSMENT & PLAN NOTE
Lab Results   Component Value Date    HGBA1C 6 7 (H) 07/30/2022       Recent Labs     01/02/23  0520 01/02/23  0753 01/02/23  1002 01/02/23  1047   POCGLU 195* 165* 318* 306*       Blood Sugar Average: Last 72 hrs:  (P) 237 3   Resume home regimen

## 2023-01-02 NOTE — DISCHARGE SUMMARY
Martha 48  Discharge- 1930 [address] 1970, 46 y o  male MRN: [id number]  Unit/Bed#: E2 -01 Encounter: [id number]  Primary Care Provider: No primary care provider on file  Date and time admitted to hospital: 12/31/2022  9:24 PM    Admitting Provider:  Asha Blevins MD  Discharge Provider:  Eulogio Payan DO  Admission Date: 12/31/2022       Discharge Date: 01/02/23   LOS: 0  Primary Care Physician at Discharge: No primary care provider on file  None    HOSPITAL COURSE:  1930 [address] Vargas is a 46 y o  male who presented alcoholic intoxication on New Year's Cece  The patient has a past medical history of atrial fibrillation on anticoagulation, hypertension and diabetes mellitus  He was found to have altered mental status  He was noted to have an alcohol level greater than 200  He was also found to be hypoglycemic and treated with an amp of D50 x2  He was also found to have hypokalemia and reported having loose stools  The patient subsequently was placed on intravenous potassium intravenous magnesium repletion  He felt well  It was felt that his altered mental status as well as electrolyte abnormalities are multifactorial and the combination of hypoglycemia, as well as alcohol intoxication  In addition the patient was found to be hypothermic which resolved  The patient subsequently improved, he was started on D5 which was subsequently discontinued  Repeat Accu-Cheks remain within normal limits  Patient was started on oral diet and started on formed stools  It was felt less likely the patient had any type of infectious colitis  At the time of discharge patient was on an oral diet without acute complaint was medically otherwise for discharge home  All questions were answered to the patient's satisfaction he was in agreement with the discharge plan      DISCHARGE DIAGNOSES  * Type 2 diabetes mellitus with stage 3 chronic kidney disease, with long-term current use of insulin [facility])  Assessment & Plan  Lab Results   Component Value Date    HGBA1C 6 7 (H) 07/30/2022       Recent Labs     01/02/23  0520 01/02/23  0753 01/02/23  1002 01/02/23  1047   POCGLU 195* 165* 318* 306*       Blood Sugar Average: Last 72 hrs:  (P) 237 3   Resume home regimen    Diarrhea  Assessment & Plan  No evidence of C  diff    Hypothermia  Assessment & Plan  Recurrent in setting of hypoglycemia  Random cortisol level 14 2 in 09/22 and tsh wnl as well as that time  No s/sx of sepsis  Resolved    Hypokalemia  Assessment & Plan  Resolved  Recent Labs     01/01/23  1645 01/01/23  2325 01/02/23  0800   K 3 9 4 4 3 7         Alcohol intoxication in active alcoholic with complication [facility])  Assessment & Plan  Alcohol serum level 278  Had been drinking for New Year's Cece  Start thiamine/folic acid/mvi    Essential hypertension  Assessment & Plan  Resume at discharge  Potassium and Magnesium repleted  CONSULTING PROVIDERS   None    PROCEDURES PERFORMED  * No surgery found *    RADIOLOGY RESULTS  XR chest 1 view portable      Impression: No acute cardiopulmonary disease  Findings are stable      CT head without contrast    Impression: No acute intracranial abnormality  CT head without contrast    Impression: No acute intracranial abnormality        LABS  Results from last 7 days   Lab Units 01/02/23  0423 12/31/22  2138   WBC Thousand/uL 10 58* 8 43   HEMOGLOBIN g/dL 13 0 13 8   HEMATOCRIT % 37 5 38 1   MCV fL 90 86   PLATELETS Thousands/uL 195 231   INR   --  0 97     Results from last 7 days   Lab Units 01/02/23  0800 01/01/23  2325 01/01/23  1645 01/01/23  0437 12/31/22  2138   SODIUM mmol/L 134* 134* 140 144 144   POTASSIUM mmol/L 3 7 4 4 3 9 2 3* 2 0*   CHLORIDE mmol/L 100 100 103 104 103   CO2 mmol/L 26 28 29 28 29   BUN mg/dL 7 9 9 9 9   CREATININE mg/dL 1 00 1 13 1 17 1 07 1 20   CALCIUM mg/dL 8 4 8 4 8 6 8 4 8 5   ALBUMIN g/dL  --   --   --   --  3 4*   TOTAL BILIRUBIN mg/dL  --   --   --   -- 0  39   ALK PHOS U/L  --   --   --   --  90   ALT U/L  --   --   --   --  22   AST U/L  --   --   --   --  23   EGFR ml/min/1 73sq m 86 74 71 79 69   GLUCOSE RANDOM mg/dL 178* 249* 140 320* 147*                  Results from last 7 days   Lab Units 01/02/23  1047 01/02/23  1002 01/02/23  0753 01/02/23  0520 01/02/23  0334 01/02/23  0118 01/01/23  2324 01/01/23  2128 01/01/23  1927 01/01/23  1727   POC GLUCOSE mg/dl 306* 318* 165* 195* 233* 254* 235* 257* 368* 258*                       Cultures:         Invalid input(s): URIBILINOGEN              PHYSICAL EXAM:  Vitals:   Blood Pressure: 153/82 (01/02/23 1100)  Pulse: 61 (01/02/23 1100)  Temperature: 98 1 °F (36 7 °C) (01/02/23 0713)  Temp Source: Temporal (01/02/23 0713)  Respirations: 19 (01/02/23 0713)  Height: 5' 9" (175 3 cm) (01/01/23 0705)  Weight - Scale: 62 3 kg (137 lb 5 6 oz) (01/01/23 0705)  SpO2: 95 % (01/02/23 0713)      General: well appearing, no acute distress  HEENT: atraumatic, PERRLA, moist mucosa, normal pharynx, normal tonsils and adenoids, normal tongue, no fluid in sinuses  Neck: Trachea midline, no carotid bruit, no masses  Respiratory: normal chest wall expansion, CTA B, no r/r/w, no rubs  Cardiovascular: RRR, no m/r/g, Normal S1 and S2  Abdomen: Soft, non-tender, non-distended, normal bowel sounds in all quadrants, no hepatosplenomegaly, no tympany  Rectal: deferred  Musculoskeletal: normal ROM in upper and lower extremities  Integumentary: warm, dry, and pink, with no rash, purpura, or petechia  Heme/Lymph: no lymphadenopathy, no bruises  Neurological: Cranial Nerves II-XII grossly intact, no tics, normal sensation to pressure and light touch  Psychiatric: cooperative with normal mood, affect, and cognition      Discharge Disposition:Home    Test Results Pending at Discharge:           Medications   · Summary of Medication Adjustments made as a result of this hospitalization: See discharge list  · Medication Dosing Tapers - Please refer to Discharge Medication List for details on any medication dosing tapers (if applicable to patient)  · Discharge Medication List: See after visit summary for reconciled discharge medications  Diet restrictions:         Diet Orders   (From admission, onward)             Start     Ordered    01/01/23 0822  Diet Seferino/CHO Controlled; Consistent Carbohydrate Diet Level 2 (5 carb servings/75 grams CHO/meal)  Diet effective now        References:    Nutrtion Support Algorithm Enteral vs  Parenteral   Question Answer Comment   Diet Type Seferino/CHO Controlled    Seferino/CHO Controlled Consistent Carbohydrate Diet Level 2 (5 carb servings/75 grams CHO/meal)    RD to adjust diet per protocol? Yes        01/01/23 0821              Activity restrictions: No strenuous activity  Discharge Condition: good    Outpatient Follow-Up and Discharge Instructions  See after visit summary section titled Discharge Instructions for information provided to patient and family  Code Status: Level 1 - Full Code  Discharge Statement   I spent 35 minutes discharging the patient  This time was spent on the day of discharge  Greater than 50% of total time was spent with the patient and / or family counseling and / or coordination of care  ** Please Note: This note was completed in part utilizing [email or web address] Direct Software  Grammatical errors, random word insertions, spelling mistakes, and incomplete sentences may be an occasional consequence of this system secondary to software limitations, ambient noise, and hardware issues  If you have any questions or concerns about the content, text, or information contained within the body of this dictation, please contact the provider for clarification  **

## 2023-01-02 NOTE — ASSESSMENT & PLAN NOTE
Recurrent in setting of hypoglycemia  Random cortisol level 14 2 in 09/22 and tsh wnl as well as that time  No s/sx of sepsis  Resolved

## 2023-01-03 ENCOUNTER — PATIENT OUTREACH (OUTPATIENT)
Dept: CASE MANAGEMENT | Facility: HOSPITAL | Age: 53
End: 2023-01-03

## 2023-01-03 NOTE — PROGRESS NOTES
ADT notification received for patient discharge from hospital  Patient was inderjit, spitalized for 2 days (12/31/2022 - 1/2/2023) for  Type 2 diabetes mellitus with stage 3 chronic kidney disease  Patient presented with alcoholic intoxication, altered mental status and hypoglycemia  With the assistance [employer] [place] interpretor,  ID# [id number], this CM contacted the patient  Patient's father initially answered the phone stating the patient was still sleeping  RN CM requested patient return call and interpretor provided call back number  Father then questioned what this call was for  RN CM explained the complex care management program with the interpretors assistance to and purpose of this phone call is to assess patient's general wellbeing or for any assistance needed with follow-up care  Father reports that he helps the patient with his medications and the father picks up the medicine to help  Father also reports patient is without a PCP at present time and uses the hospital doctors to provide care  Care manager attempted to explain the ability for this care manager to assist with locating PCP with intent to decrease ED utilization but Father reports that he wants to leave things as is as he knows he can take him to the emergency room and they provide whatever care he needs  Father states that patient, despite being an older man, is like a child with a mentality of 3year old for which the father needs to provide care for  Father also states that he himself is an older man and his wife no longer goes out anymore  He states confusion about the intent of this care managers call stating he already knows the address of the hospital and if patient needs to have medications sent to send to the pharmacy and he will         Another attempt was made with the interpretor assistance to explain the role of this RN CM and purpose of call to assess patient's general wellbeing or for any assistance needed with follow-up care  Attempted to offer assistance in locating PCP but father declined stating he knows Armando Tejeda will get the care he needs at the emergency room  This care manager attempted to review current medications patient is taking and father replied that he takes care of that and refused to discuss the medications        Rising Utilizer episode resolved and CM removed self from care team

## 2023-01-29 ENCOUNTER — APPOINTMENT (EMERGENCY)
Dept: RADIOLOGY | Facility: HOSPITAL | Age: 53
End: 2023-01-29

## 2023-01-29 ENCOUNTER — HOSPITAL ENCOUNTER (INPATIENT)
Facility: HOSPITAL | Age: 53
LOS: 3 days | Discharge: HOME | End: 2023-02-03
Attending: EMERGENCY MEDICINE

## 2023-01-29 DIAGNOSIS — M54.50 CHRONIC LOW BACK PAIN: ICD-10-CM

## 2023-01-29 DIAGNOSIS — G89.29 CHRONIC LOW BACK PAIN: ICD-10-CM

## 2023-01-29 DIAGNOSIS — Z79.4 TYPE 2 DIABETES MELLITUS WITH STAGE 3 CHRONIC KIDNEY DISEASE, WITH LONG-TERM CURRENT USE OF INSULIN, UNSPECIFIED WHETHER STAGE 3A OR 3B CKD (HCC): ICD-10-CM

## 2023-01-29 DIAGNOSIS — F10.10 ALCOHOL ABUSE: ICD-10-CM

## 2023-01-29 DIAGNOSIS — E87.6 HYPOKALEMIA: Primary | ICD-10-CM

## 2023-01-29 DIAGNOSIS — G89.29 ACUTE EXACERBATION OF CHRONIC LOW BACK PAIN: ICD-10-CM

## 2023-01-29 DIAGNOSIS — E16.2 HYPOGLYCEMIA: ICD-10-CM

## 2023-01-29 DIAGNOSIS — F10.939 ALCOHOL WITHDRAWAL (HCC): ICD-10-CM

## 2023-01-29 DIAGNOSIS — M54.50 ACUTE EXACERBATION OF CHRONIC LOW BACK PAIN: ICD-10-CM

## 2023-01-29 DIAGNOSIS — N18.30 TYPE 2 DIABETES MELLITUS WITH STAGE 3 CHRONIC KIDNEY DISEASE, WITH LONG-TERM CURRENT USE OF INSULIN, UNSPECIFIED WHETHER STAGE 3A OR 3B CKD (HCC): ICD-10-CM

## 2023-01-29 DIAGNOSIS — E11.22 TYPE 2 DIABETES MELLITUS WITH STAGE 3 CHRONIC KIDNEY DISEASE, WITH LONG-TERM CURRENT USE OF INSULIN, UNSPECIFIED WHETHER STAGE 3A OR 3B CKD (HCC): ICD-10-CM

## 2023-01-29 DIAGNOSIS — R53.1 GENERALIZED WEAKNESS: ICD-10-CM

## 2023-01-29 LAB
2HR DELTA HS TROPONIN: -2 NG/L
4HR DELTA HS TROPONIN: -1 NG/L
ALBUMIN SERPL BCP-MCNC: 4 G/DL (ref 3.5–5)
ALP SERPL-CCNC: 60 U/L (ref 34–104)
ALT SERPL W P-5'-P-CCNC: 31 U/L (ref 7–52)
ANION GAP SERPL CALCULATED.3IONS-SCNC: 8 MMOL/L (ref 4–13)
APTT PPP: 23 SECONDS (ref 23–37)
AST SERPL W P-5'-P-CCNC: 42 U/L (ref 13–39)
ATRIAL RATE: 250 BPM
ATRIAL RATE: 71 BPM
BASOPHILS # BLD AUTO: 0.02 THOUSANDS/ÂΜL (ref 0–0.1)
BASOPHILS NFR BLD AUTO: 0 % (ref 0–1)
BILIRUB SERPL-MCNC: 1.05 MG/DL (ref 0.2–1)
BUN SERPL-MCNC: 9 MG/DL (ref 5–25)
CALCIUM SERPL-MCNC: 8.9 MG/DL (ref 8.4–10.2)
CARDIAC TROPONIN I PNL SERPL HS: 10 NG/L
CARDIAC TROPONIN I PNL SERPL HS: 11 NG/L
CARDIAC TROPONIN I PNL SERPL HS: 9 NG/L
CHLORIDE SERPL-SCNC: 98 MMOL/L (ref 96–108)
CO2 SERPL-SCNC: 33 MMOL/L (ref 21–32)
CREAT SERPL-MCNC: 1.12 MG/DL (ref 0.6–1.3)
EOSINOPHIL # BLD AUTO: 0.14 THOUSAND/ÂΜL (ref 0–0.61)
EOSINOPHIL NFR BLD AUTO: 2 % (ref 0–6)
ERYTHROCYTE [DISTWIDTH] IN BLOOD BY AUTOMATED COUNT: 11.8 % (ref 11.6–15.1)
ETHANOL SERPL-MCNC: <10 MG/DL
GFR SERPL CREATININE-BSD FRML MDRD: 75 ML/MIN/1.73SQ M
GLUCOSE SERPL-MCNC: 115 MG/DL (ref 65–140)
GLUCOSE SERPL-MCNC: 167 MG/DL (ref 65–140)
GLUCOSE SERPL-MCNC: 20 MG/DL (ref 65–140)
GLUCOSE SERPL-MCNC: 205 MG/DL (ref 65–140)
GLUCOSE SERPL-MCNC: 282 MG/DL (ref 65–140)
GLUCOSE SERPL-MCNC: 328 MG/DL (ref 65–140)
GLUCOSE SERPL-MCNC: 35 MG/DL (ref 65–140)
GLUCOSE SERPL-MCNC: 41 MG/DL (ref 65–140)
GLUCOSE SERPL-MCNC: 62 MG/DL (ref 65–140)
GLUCOSE SERPL-MCNC: 63 MG/DL (ref 65–140)
HCT VFR BLD AUTO: 36 % (ref 36.5–49.3)
HGB BLD-MCNC: 13.2 G/DL (ref 12–17)
IMM GRANULOCYTES # BLD AUTO: 0.03 THOUSAND/UL (ref 0–0.2)
IMM GRANULOCYTES NFR BLD AUTO: 0 % (ref 0–2)
INR PPP: 0.96 (ref 0.84–1.19)
LYMPHOCYTES # BLD AUTO: 2.17 THOUSANDS/ÂΜL (ref 0.6–4.47)
LYMPHOCYTES NFR BLD AUTO: 28 % (ref 14–44)
MCH RBC QN AUTO: 32.3 PG (ref 26.8–34.3)
MCHC RBC AUTO-ENTMCNC: 36.7 G/DL (ref 31.4–37.4)
MCV RBC AUTO: 88 FL (ref 82–98)
MONOCYTES # BLD AUTO: 1 THOUSAND/ÂΜL (ref 0.17–1.22)
MONOCYTES NFR BLD AUTO: 13 % (ref 4–12)
NEUTROPHILS # BLD AUTO: 4.45 THOUSANDS/ÂΜL (ref 1.85–7.62)
NEUTS SEG NFR BLD AUTO: 57 % (ref 43–75)
NRBC BLD AUTO-RTO: 0 /100 WBCS
P AXIS: 62 DEGREES
P AXIS: 87 DEGREES
PLATELET # BLD AUTO: 173 THOUSANDS/UL (ref 149–390)
PLATELET # BLD AUTO: 199 THOUSANDS/UL (ref 149–390)
PMV BLD AUTO: 10.2 FL (ref 8.9–12.7)
PMV BLD AUTO: 10.5 FL (ref 8.9–12.7)
POTASSIUM SERPL-SCNC: 2.7 MMOL/L (ref 3.5–5.3)
PR INTERVAL: 142 MS
PROT SERPL-MCNC: 6.8 G/DL (ref 6.4–8.4)
PROTHROMBIN TIME: 12.8 SECONDS (ref 11.6–14.5)
QRS AXIS: 46 DEGREES
QRS AXIS: 48 DEGREES
QRSD INTERVAL: 94 MS
QRSD INTERVAL: 96 MS
QT INTERVAL: 396 MS
QT INTERVAL: 404 MS
QTC INTERVAL: 439 MS
QTC INTERVAL: 442 MS
RBC # BLD AUTO: 4.09 MILLION/UL (ref 3.88–5.62)
SODIUM SERPL-SCNC: 139 MMOL/L (ref 135–147)
T WAVE AXIS: -15 DEGREES
T WAVE AXIS: -33 DEGREES
VENTRICULAR RATE: 71 BPM
VENTRICULAR RATE: 75 BPM
WBC # BLD AUTO: 7.81 THOUSAND/UL (ref 4.31–10.16)

## 2023-01-29 RX ORDER — POTASSIUM CHLORIDE 14.9 MG/ML
20 INJECTION INTRAVENOUS ONCE
Status: COMPLETED | OUTPATIENT
Start: 2023-01-29 | End: 2023-01-29

## 2023-01-29 RX ORDER — CHLORDIAZEPOXIDE HYDROCHLORIDE 25 MG/1
25 CAPSULE, GELATIN COATED ORAL EVERY 8 HOURS SCHEDULED
Status: DISCONTINUED | OUTPATIENT
Start: 2023-01-29 | End: 2023-02-01

## 2023-01-29 RX ORDER — CHLORDIAZEPOXIDE HYDROCHLORIDE 25 MG/1
50 CAPSULE, GELATIN COATED ORAL ONCE
Status: COMPLETED | OUTPATIENT
Start: 2023-01-29 | End: 2023-01-29

## 2023-01-29 RX ORDER — ACETAMINOPHEN 325 MG/1
650 TABLET ORAL EVERY 6 HOURS PRN
Status: DISCONTINUED | OUTPATIENT
Start: 2023-01-29 | End: 2023-01-31

## 2023-01-29 RX ORDER — INSULIN LISPRO 100 [IU]/ML
1-5 INJECTION, SOLUTION INTRAVENOUS; SUBCUTANEOUS
Status: DISCONTINUED | OUTPATIENT
Start: 2023-01-29 | End: 2023-02-03 | Stop reason: HOSPADM

## 2023-01-29 RX ORDER — POTASSIUM CHLORIDE 20 MEQ/1
20 TABLET, EXTENDED RELEASE ORAL ONCE
Status: COMPLETED | OUTPATIENT
Start: 2023-01-29 | End: 2023-01-29

## 2023-01-29 RX ORDER — FOLIC ACID 1 MG/1
1 TABLET ORAL DAILY
Status: DISCONTINUED | OUTPATIENT
Start: 2023-01-29 | End: 2023-02-03 | Stop reason: HOSPADM

## 2023-01-29 RX ORDER — LANOLIN ALCOHOL/MO/W.PET/CERES
100 CREAM (GRAM) TOPICAL DAILY
Status: DISCONTINUED | OUTPATIENT
Start: 2023-01-29 | End: 2023-02-03 | Stop reason: HOSPADM

## 2023-01-29 RX ORDER — LORAZEPAM 2 MG/ML
1 INJECTION INTRAMUSCULAR ONCE
Status: COMPLETED | OUTPATIENT
Start: 2023-01-29 | End: 2023-01-29

## 2023-01-29 RX ORDER — HYDRALAZINE HYDROCHLORIDE 20 MG/ML
5 INJECTION INTRAMUSCULAR; INTRAVENOUS EVERY 6 HOURS PRN
Status: DISCONTINUED | OUTPATIENT
Start: 2023-01-29 | End: 2023-02-03 | Stop reason: HOSPADM

## 2023-01-29 RX ORDER — HYDROCHLOROTHIAZIDE 25 MG/1
25 TABLET ORAL DAILY
Status: DISCONTINUED | OUTPATIENT
Start: 2023-01-29 | End: 2023-02-03 | Stop reason: HOSPADM

## 2023-01-29 RX ORDER — LISINOPRIL 20 MG/1
40 TABLET ORAL DAILY
Status: DISCONTINUED | OUTPATIENT
Start: 2023-01-29 | End: 2023-02-03 | Stop reason: HOSPADM

## 2023-01-29 RX ORDER — POTASSIUM CHLORIDE, DEXTROSE MONOHYDRATE 150; 5 MG/100ML; G/100ML
50 INJECTION, SOLUTION INTRAVENOUS CONTINUOUS
Status: DISCONTINUED | OUTPATIENT
Start: 2023-01-29 | End: 2023-01-30

## 2023-01-29 RX ORDER — ENOXAPARIN SODIUM 100 MG/ML
40 INJECTION SUBCUTANEOUS DAILY
Status: DISCONTINUED | OUTPATIENT
Start: 2023-01-29 | End: 2023-02-03 | Stop reason: HOSPADM

## 2023-01-29 RX ORDER — ONDANSETRON 2 MG/ML
4 INJECTION INTRAMUSCULAR; INTRAVENOUS EVERY 6 HOURS PRN
Status: DISCONTINUED | OUTPATIENT
Start: 2023-01-29 | End: 2023-02-03 | Stop reason: HOSPADM

## 2023-01-29 RX ADMIN — CHLORDIAZEPOXIDE HYDROCHLORIDE 25 MG: 25 CAPSULE ORAL at 14:26

## 2023-01-29 RX ADMIN — CHLORDIAZEPOXIDE HYDROCHLORIDE 25 MG: 25 CAPSULE ORAL at 21:25

## 2023-01-29 RX ADMIN — INSULIN LISPRO 1 UNITS: 100 INJECTION, SOLUTION INTRAVENOUS; SUBCUTANEOUS at 11:43

## 2023-01-29 RX ADMIN — Medication 12.5 MG: at 17:47

## 2023-01-29 RX ADMIN — POTASSIUM CHLORIDE 20 MEQ: 1500 TABLET, EXTENDED RELEASE ORAL at 08:20

## 2023-01-29 RX ADMIN — FOLIC ACID 1 MG: 1 TABLET ORAL at 11:19

## 2023-01-29 RX ADMIN — DEXTROSE AND POTASSIUM CHLORIDE 75 ML/HR: 5; .15 SOLUTION INTRAVENOUS at 12:41

## 2023-01-29 RX ADMIN — Medication 12.5 MG: at 11:19

## 2023-01-29 RX ADMIN — THIAMINE HCL TAB 100 MG 100 MG: 100 TAB at 11:17

## 2023-01-29 RX ADMIN — CHLORDIAZEPOXIDE HYDROCHLORIDE 50 MG: 25 CAPSULE ORAL at 08:21

## 2023-01-29 RX ADMIN — Medication 16 G: at 15:25

## 2023-01-29 RX ADMIN — ENOXAPARIN SODIUM 40 MG: 40 INJECTION SUBCUTANEOUS at 11:21

## 2023-01-29 RX ADMIN — POTASSIUM CHLORIDE 20 MEQ: 14.9 INJECTION, SOLUTION INTRAVENOUS at 08:22

## 2023-01-29 RX ADMIN — HYDROCHLOROTHIAZIDE 25 MG: 25 TABLET ORAL at 14:26

## 2023-01-29 RX ADMIN — LORAZEPAM 1 MG: 2 INJECTION INTRAMUSCULAR; INTRAVENOUS at 08:19

## 2023-01-29 RX ADMIN — FOLIC ACID: 5 INJECTION, SOLUTION INTRAMUSCULAR; INTRAVENOUS; SUBCUTANEOUS at 08:22

## 2023-01-29 RX ADMIN — LISINOPRIL 40 MG: 20 TABLET ORAL at 14:26

## 2023-01-29 RX ADMIN — MULTIPLE VITAMINS W/ MINERALS TAB 1 TABLET: TAB ORAL at 11:18

## 2023-01-29 RX ADMIN — ACETAMINOPHEN 650 MG: 325 TABLET ORAL at 11:19

## 2023-01-29 NOTE — ED PROVIDER NOTES
History  Chief Complaint   Patient presents with   • Hypoglycemia - Symptomatic   • Withdrawal - Alcohol     Pt reports drinks a bottle of alcohol daily last drink was at night, pt reprots lower bilaterally back pain and knee weakness  Pt also reports low blood sugar  Sugar of 63 in triage      47y M here with multiple complaints  Hx of DM, checked his blood sugar this am and it was 62  Pt reports feeling very weak and that his legs felt like they were going to give out so came in for evaluation  Additionally, pt reports daily etoh - beer and bicardi  When asked how much just responded "a lot"  Denies hx of severe w/d or w/d seizures and is interested in stopping etoh  Finally, pt w/ chronic LBP w/ radiation down both legs  Denies any numbness or tingling, no b/b incont, no saddle/perineal anesthesia/paresthesias, no f/c/s,      History provided by:  Patient and relative   used: Yes ([facility])    Malaise - 7 years or greater  Severity:  Severe  Onset quality:  Gradual  Timing:  Constant  Progression:  Unchanged  Chronicity:  New  Context: alcohol use    Context: not recent infection and not stress    Relieved by:  Nothing  Worsened by:  Standing  Ineffective treatments:  None tried  Associated symptoms: anorexia and difficulty walking    Associated symptoms: no abdominal pain, no arthralgias, no ataxia, no dysphagia, no dysuria, no numbness in extremities, no falls, no fever, no nausea, no sensory-motor deficit, no shortness of breath, no urgency and no vomiting    Risk factors: diabetes        Prior to Admission Medications   Prescriptions Last Dose Informant Patient Reported? Taking?    Blood Glucose Monitoring Suppl (Accu-Chek Guide) w/Device KIT   No No   Sig: Use 3 (three) times a day   Insulin Pen Needle (Unifine Pentips) 32G X 4 MM MISC   No Yes   Sig: Inject as directed daily   Lancets (accu-chek multiclix) lancets   No No   Sig: Check blood sugar 3 times a day   glucose blood (Accu-Chek Guide) test strip   No No   Sig: Check blood sugar 3 times a day   lisinopril-hydrochlorothiazide (PRINZIDE,ZESTORETIC) 20-12 5 MG per tablet   No No   Sig: Take 2 tablets by mouth daily   metoprolol tartrate (LOPRESSOR) 25 mg tablet   No No   Sig: Take 0 5 tablets (12 5 mg total) by mouth every 12 (twelve) hours   thiamine (VITAMIN B1) 100 mg tablet   No No   Sig: Take 1 tablet (100 mg total) by mouth daily Do not start before January 3, 2023  Facility-Administered Medications: None       Past Medical History:   Diagnosis Date   • Alcohol abuse    • Chronic pancreatitis ([facility] )    • Diabetes mellitus ([facility] [address] )     Type 2   • Pancreatitis    • Paroxysmal A-fib ([facility] )    • Thrombocytopenia (HCC)        Past Surgical History:   Procedure Laterality Date   • INCISION AND DRAINAGE OF WOUND N/A 8/16/2020    Procedure: INCISION AND DRAINAGE (I&D) HEAD/FACE;  Surgeon: Jean Cannon DMD;  Location: [facility];  Service: Maxillofacial   • IR BIOPSY BONE MARROW  2/7/2019   • REMOVAL OF IMPACTED TOOTH - COMPLETELY BONY N/A 8/16/2020    Procedure: EXTRACTION TEETH MULTIPLE #2, 3;  Surgeon: Jean Cannon DMD;  Location: [facility];  Service: Maxillofacial       Family History   Problem Relation Age of Onset   • Diabetes Mother    • Hypertension Mother    • Hyperlipidemia Father      I have reviewed and agree with the history as documented  E-Cigarette/Vaping     E-Cigarette/Vaping Substances     Social History     Tobacco Use   • Smoking status: Former   • Smokeless tobacco: Never   Substance Use Topics   • Alcohol use: Yes   • Drug use: Yes     Types: Cocaine       Review of Systems   Constitutional: Negative for fever  Respiratory: Negative for shortness of breath  Gastrointestinal: Positive for anorexia  Negative for abdominal pain, dysphagia, nausea and vomiting  Genitourinary: Negative for dysuria and urgency  Musculoskeletal: Negative for arthralgias and falls     All other systems reviewed and are negative  Physical Exam  Physical Exam  Vitals and nursing note reviewed  Constitutional:       General: He is not in acute distress  Appearance: He is not ill-appearing, toxic-appearing or diaphoretic  Comments: Strong smell of alcohol   HENT:      Mouth/Throat:      Mouth: Mucous membranes are moist    Eyes:      Conjunctiva/sclera: Conjunctivae normal    Cardiovascular:      Rate and Rhythm: Normal rate and regular rhythm  Pulmonary:      Effort: Pulmonary effort is normal       Breath sounds: Normal breath sounds  Abdominal:      Palpations: Abdomen is soft  Musculoskeletal:         General: No swelling or tenderness  Skin:     General: Skin is warm  Neurological:      General: No focal deficit present  Mental Status: He is alert and oriented to person, place, and time  Sensory: No sensory deficit  Motor: Tremor present  No weakness     Psychiatric:         Mood and Affect: Mood normal          Vital Signs  ED Triage Vitals   Temperature Pulse Respirations Blood Pressure SpO2   01/29/23 0725 01/29/23 0707 01/29/23 0707 01/29/23 0707 01/29/23 0707   98 3 °F (36 8 °C) 82 16 (!) 239/108 100 %      Temp Source Heart Rate Source Patient Position - Orthostatic VS BP Location FiO2 (%)   01/29/23 0725 01/29/23 0707 01/29/23 0829 01/29/23 0707 --   Oral Monitor Lying Right arm       Pain Score       01/29/23 1119       7           Vitals:    01/29/23 0936 01/29/23 1100 01/29/23 1141 01/29/23 1240   BP: 135/73 155/80 (!) 176/81 148/68   Pulse: 68 70 70 70   Patient Position - Orthostatic VS: Lying Lying Lying Lying         Visual Acuity      ED Medications  Medications   thiamine (VITAMIN B1) 294 mg, folic acid 1 mg, magnesium sulfate 2 g in dextrose 5 % and sodium chloride 0 9 % 1,000 mL infusion ( Intravenous New Bag 1/29/23 0822)   dextrose 5 % with KCl 20 mEq/L infusion (premix) (75 mL/hr Intravenous New Bag 1/29/23 1241)   insulin lispro (HumaLOG) 100 units/mL subcutaneous injection 1-5 Units (1 Units Subcutaneous Given 1/29/23 1143)   acetaminophen (TYLENOL) tablet 650 mg (650 mg Oral Given 1/29/23 1119)   ondansetron (ZOFRAN) injection 4 mg (has no administration in time range)   enoxaparin (LOVENOX) subcutaneous injection 40 mg (40 mg Subcutaneous Given 1/29/23 1121)   thiamine tablet 100 mg (100 mg Oral Given 4/91/58 8348)   folic acid (FOLVITE) tablet 1 mg (1 mg Oral Given 1/29/23 1119)   multivitamin-minerals (CENTRUM) tablet 1 tablet (1 tablet Oral Given 1/29/23 1118)   metoprolol tartrate (LOPRESSOR) partial tablet 12 5 mg (12 5 mg Oral Given 1/29/23 1119)   chlordiazePOXIDE (LIBRIUM) capsule 25 mg (has no administration in time range)   lisinopril (ZESTRIL) tablet 40 mg (has no administration in time range)     And   hydrochlorothiazide (HYDRODIURIL) tablet 25 mg (has no administration in time range)   potassium chloride (K-DUR,KLOR-CON) CR tablet 20 mEq (20 mEq Oral Given 1/29/23 0820)   potassium chloride 20 mEq IVPB (premix) (20 mEq Intravenous New Bag 1/29/23 0822)   chlordiazePOXIDE (LIBRIUM) capsule 50 mg (50 mg Oral Given 1/29/23 0821)   LORazepam (ATIVAN) injection 1 mg (1 mg Intravenous Given 1/29/23 0819)       Diagnostic Studies  Results Reviewed     Procedure Component Value Units Date/Time    HS Troponin I 4hr [[id number]]  (Normal) Collected: 01/29/23 1139    Lab Status: Final result Specimen: Blood from Arm, Right Updated: 01/29/23 1209     hs TnI 4hr 10 ng/L      Delta 4hr hsTnI -1 ng/L     Platelet count [[id number]]  (Normal) Collected: 01/29/23 1139    Lab Status: Final result Specimen: Blood from Arm, Right Updated: 01/29/23 1147     Platelets 645 Thousands/uL      MPV 10 5 fL     Fingerstick Glucose (POCT) [[id number]]  (Abnormal) Collected: 01/29/23 1100    Lab Status: Final result Updated: 01/29/23 1101     POC Glucose 167 mg/dl     HS Troponin I 2hr [[id number]]  (Normal) Collected: 01/29/23 0936    Lab Status: Final result Specimen: Blood from Arm, Right Updated: 01/29/23 1008     hs TnI 2hr 9 ng/L      Delta 2hr hsTnI -2 ng/L     Comprehensive metabolic panel [[id number]]  (Abnormal) Collected: 01/29/23 0727    Lab Status: Final result Specimen: Blood from Arm, Left Updated: 01/29/23 0804     Sodium 139 mmol/L      Potassium 2 7 mmol/L      Chloride 98 mmol/L      CO2 33 mmol/L      ANION GAP 8 mmol/L      BUN 9 mg/dL      Creatinine 1 12 mg/dL      Glucose 62 mg/dL      Calcium 8 9 mg/dL      AST 42 U/L      ALT 31 U/L      Alkaline Phosphatase 60 U/L      Total Protein 6 8 g/dL      Albumin 4 0 g/dL      Total Bilirubin 1 05 mg/dL      eGFR 75 ml/min/1 73sq m     Narrative:      [place] guidelines for Chronic Kidney Disease (CKD):   •  Stage 1 with normal or high GFR (GFR > 90 mL/min/1 73 square meters)  •  Stage 2 Mild CKD (GFR = 60-89 mL/min/1 73 square meters)  •  Stage 3A Moderate CKD (GFR = 45-59 mL/min/1 73 square meters)  •  Stage 3B Moderate CKD (GFR = 30-44 mL/min/1 73 square meters)  •  Stage 4 Severe CKD (GFR = 15-29 mL/min/1 73 square meters)  •  Stage 5 End Stage CKD (GFR <15 mL/min/1 73 square meters)  Note: GFR calculation is accurate only with a steady state creatinine    HS Troponin 0hr (reflex protocol) [[id number]]  (Normal) Collected: 01/29/23 0727    Lab Status: Final result Specimen: Blood from Arm, Left Updated: 01/29/23 0756     hs TnI 0hr 11 ng/L     Protime-INR [[id number]]  (Normal) Collected: 01/29/23 0727    Lab Status: Final result Specimen: Blood from Arm, Left Updated: 01/29/23 0755     Protime 12 8 seconds      INR 0 96    APTT [[id number]]  (Normal) Collected: 01/29/23 0727    Lab Status: Final result Specimen: Blood from Arm, Left Updated: 01/29/23 0755     PTT 23 seconds     Ethanol [[id number]]  (Normal) Collected: 01/29/23 0727    Lab Status: Final result Specimen: Blood from Arm, Left Updated: 01/29/23 0754     Ethanol Lvl <10 mg/dL     CBC and differential [[id number]]  (Abnormal) Collected: 01/29/23 7754    Lab Status: Final result Specimen: Blood from Arm, Left Updated: 01/29/23 0733     WBC 7 81 Thousand/uL      RBC 4 09 Million/uL      Hemoglobin 13 2 g/dL      Hematocrit 36 0 %      MCV 88 fL      MCH 32 3 pg      MCHC 36 7 g/dL      RDW 11 8 %      MPV 10 2 fL      Platelets 560 Thousands/uL      nRBC 0 /100 WBCs      Neutrophils Relative 57 %      Immat GRANS % 0 %      Lymphocytes Relative 28 %      Monocytes Relative 13 %      Eosinophils Relative 2 %      Basophils Relative 0 %      Neutrophils Absolute 4 45 Thousands/µL      Immature Grans Absolute 0 03 Thousand/uL      Lymphocytes Absolute 2 17 Thousands/µL      Monocytes Absolute 1 00 Thousand/µL      Eosinophils Absolute 0 14 Thousand/µL      Basophils Absolute 0 02 Thousands/µL     Rapid drug screen, urine [[id number]]     Lab Status: No result Specimen: Urine     Fingerstick Glucose (POCT) [[id number]]  (Abnormal) Collected: 01/29/23 0701    Lab Status: Final result Updated: 01/29/23 0703     POC Glucose 63 mg/dl                  XR chest 1 view portable   ED Interpretation by Bianca Pitts DO (01/29 0123)   Xray reviewed and independently interpreted by me: no acute infiltrates      Final Result by Claire Parker MD (01/29 1022)      No acute cardiopulmonary disease                    Workstation performed: [id number]                    Procedures  ECG 12 Lead Documentation Only    Date/Time: 1/29/2023 7:32 AM  Performed by: Bianca Pitts DO  Authorized by: Bianca Pitts DO     Indications / Diagnosis:  Weak  ECG reviewed by me, the ED Provider: yes (independently interpreted by me)    Patient location:  ED  Previous ECG:     Previous ECG:  Compared to current    Comparison ECG info:  12/31/2022    Similarity:  Changes noted  Interpretation:     Interpretation: non-specific    Rate:     ECG rate:  72    ECG rate assessment: normal    Rhythm:     Rhythm: sinus rhythm    Ectopy:     Ectopy: none    ST segments:     ST segments: Non-specific  T waves:     T waves: non-specific    ECG 12 Lead Documentation Only    Date/Time: 1/29/2023 9:35 AM  Performed by: Karen Massey DO  Authorized by: Karen Massey DO     Indications / Diagnosis:  Delta  ECG reviewed by me, the ED Provider: yes (reviewed by me)    Patient location:  ED  Previous ECG:     Previous ECG:  Compared to current    Similarity:  No change  Interpretation:     Interpretation: non-specific    Rate:     ECG rate:  71    ECG rate assessment: normal    Rhythm:     Rhythm: sinus rhythm    Ectopy:     Ectopy: none    ST segments:     ST segments:  Non-specific  T waves:     T waves: non-specific    CriticalCare Time  Performed by: Karen Massey DO  Authorized by: Karen Massey DO     Critical care provider statement:     Critical care time (minutes):  35    Critical care time was exclusive of:  Separately billable procedures and treating other patients and teaching time    Critical care was necessary to treat or prevent imminent or life-threatening deterioration of the following conditions:  CNS failure or compromise, toxidrome and metabolic crisis    Critical care was time spent personally by me on the following activities:  Blood draw for specimens, obtaining history from patient or surrogate, development of treatment plan with patient or surrogate, evaluation of patient's response to treatment, examination of patient, ordering and performing treatments and interventions, ordering and review of laboratory studies, ordering and review of radiographic studies, re-evaluation of patient's condition and review of old charts    I assumed direction of critical care for this patient from another provider in my specialty: no               ED Course  ED Course as of 01/29/23 1345   Sun Jan 29, 2023   0835 Potassium(!!): 2 7  Ordered PO and IV replacement   0900 Ordered librium and ativan for withdrawal symptoms (tremulous, anxious, hypertensive)     8554 TT sent to [facility] for admission                               SBIRT 20yo+    Flowsheet Row Most Recent Value   SBIRT (23 yo +)    In order to provide better care to our patients, we are screening all of our patients for alcohol and drug use  Would it be okay to ask you these screening questions? No Filed at: 01/29/2023 8498                    Medical Decision Making  Here for evaluation of multiple complaints - concerns for low blood sugar w/ IDDM - alcoholic w/ poor intake  Will need eval for potential alcoholic ketoacidosis, dehydration, acute alcoholic pancreatitis/hepatits  Additionally c/o acute/chronic low back pain w/ no new injury and no red flags and non-focal exam   No evidence cauda equina  Pt w/ no point tenderness, afebrile, normal white count    Acute exacerbation of chronic low back pain: acute illness or injury  Alcohol abuse: self-limited or minor problem  Alcohol withdrawal ([facility] ): acute illness or injury that poses a threat to life or bodily functions  Generalized weakness: acute illness or injury  Hypokalemia: acute illness or injury that poses a threat to life or bodily functions  Amount and/or Complexity of Data Reviewed  Labs: ordered  Decision-making details documented in ED Course  Radiology: ordered and independent interpretation performed  ECG/medicine tests: ordered and independent interpretation performed  Risk  Prescription drug management  Decision regarding hospitalization            Disposition  Final diagnoses:   Hypokalemia   Alcohol withdrawal (HCC)   Alcohol abuse   Acute exacerbation of chronic low back pain   Generalized weakness     Time reflects when diagnosis was documented in both MDM as applicable and the Disposition within this note     Time User Action Codes Description Comment    1/29/2023  9:58 AM Felipa Lara L Add [E87 6] Hypokalemia     1/29/2023  9:58 AM CharkyFelipa L Add [F10 939] Alcohol withdrawal ([facility] )     1/29/2023  9:58 AM CharkyFelipa L Add [F10 10] Alcohol abuse 1/29/2023  9:58 AM Felipa Lara [M54 50,  G89 29] Acute exacerbation of chronic low back pain     1/29/2023  9:58 AM Felipa Lara [R53 1] Generalized weakness       ED Disposition     ED Disposition   Admit    Condition   Stable    Date/Time   Sun Jan 29, 2023 10:25 AM    Comment   Case was discussed with Dr Bill Glover and the patient's admission status was agreed to be inpatient to the service of Dr Mckenzie Trent    None         Patient's Medications   Discharge Prescriptions    No medications on file       No discharge procedures on file      PDMP Review       Value Time User    PDMP Reviewed  Yes 8/18/2020 11:39 AM Douglas Shook MD          ED Provider  Electronically Signed by           Michelle Elliott DO  01/29/23 5112

## 2023-01-29 NOTE — PLAN OF CARE
Problem: METABOLIC, FLUID AND ELECTROLYTES - ADULT  Goal: Electrolytes maintained within normal limits  Description: INTERVENTIONS:  - Monitor labs and assess patient for signs and symptoms of electrolyte imbalances  - Administer electrolyte replacement as ordered  - Monitor response to electrolyte replacements, including repeat lab results as appropriate  - Instruct patient on fluid and nutrition as appropriate  Outcome: Progressing  Goal: Fluid balance maintained  Description: INTERVENTIONS:  - Monitor labs   - Monitor I/O and WT  - Instruct patient on fluid and nutrition as appropriate  - Assess for signs & symptoms of volume excess or deficit  Outcome: Progressing  Goal: Glucose maintained within target range  Description: INTERVENTIONS:  - Monitor Blood Glucose as ordered  - Assess for signs and symptoms of hyperglycemia and hypoglycemia  - Administer ordered medications to maintain glucose within target range  - Assess nutritional intake and initiate nutrition service referral as needed  Outcome: Progressing     Problem: Nutrition/Hydration-ADULT  Goal: Nutrient/Hydration intake appropriate for improving, restoring or maintaining nutritional needs  Description: Monitor and assess patient's nutrition/hydration status for malnutrition  Collaborate with interdisciplinary team and initiate plan and interventions as ordered  Monitor patient's weight and dietary intake as ordered or per policy  Utilize nutrition screening tool and intervene as necessary  Determine patient's food preferences and provide high-protein, high-caloric foods as appropriate       INTERVENTIONS:  - Monitor oral intake, urinary output, labs, and treatment plans  - Assess nutrition and hydration status and recommend course of action  - Evaluate amount of meals eaten  - Assist patient with eating if necessary   - Allow adequate time for meals  - Recommend/ encourage appropriate diets, oral nutritional supplements, and vitamin/mineral supplements  - Order, calculate, and assess calorie counts as needed  - Recommend, monitor, and adjust tube feedings and TPN/PPN based on assessed needs  - Assess need for intravenous fluids  - Provide specific nutrition/hydration education as appropriate  - Include patient/family/caregiver in decisions related to nutrition  Outcome: Progressing     Problem: COPING  Goal: Pt/Family able to verbalize concerns and demonstrate effective coping strategies  Description: INTERVENTIONS:  - Assist patient/family to identify coping skills, available support systems and cultural and spiritual values  - Provide emotional support, including active listening and acknowledgement of concerns of patient and caregivers  - Reduce environmental stimuli, as able  - Provide patient education  - Assess for spiritual pain/suffering and initiate spiritual care, including notification of [facility] Care or bjorn based community as needed  - Assess effectiveness of coping strategies  Outcome: Progressing  Goal: Will report anxiety at manageable levels  Description: INTERVENTIONS:  - Administer medication as ordered  - Teach and encourage coping skills  - Provide emotional support  - Assess patient/family for anxiety and ability to cope  Outcome: Progressing     Problem: DECISION MAKING  Goal: Pt/Family able to effectively weigh alternatives and participate in decision making related to treatment and care  Description: INTERVENTIONS:  - Identify decision maker  - Determine when there are differences among patient's view, family's view, and healthcare provider's view of patient condition and care goals  - Facilitate patient/family articulation of goals for care  - Help patient/family identify pros/cons of alternative solutions  - Provide information as requested by patient/family  - Respect patient/family rights related to privacy and sharing information   - Serve as a liaison between patient, family and health care team  - Initiate consults as appropriate (Ethics Team, Palliative Care, Family Care Conference, etc )  Outcome: Progressing     Problem: SELF HARM  Goal: Effect of psychiatric condition will be minimized and patient will be protected from self harm  Description: INTERVENTIONS:  - Assess impact of patient's symptoms on level of functioning, self-care needs and offer support as indicated  - Assess patient/family knowledge of depression, impact on illness and need for teaching  - Provide emotional support, presence and reassurance  - Assess for possible suicidal thoughts, ideation or self-harm  If patient expresses suicidal thoughts or statements do not leave alone, notify physician/AP immediately, initiate suicide precautions, and determine need for continual observation    - initiate consults and referrals as appropriate (a mental health professional, Spiritual Care  Outcome: Progressing     Problem: SUBSTANCE USE/ABUSE  Goal: Will have no detox symptoms and will verbalize plan for changing substance-related behavior  Description: INTERVENTIONS:  - Monitor physical status and assess for symptoms of withdrawal  - Administer medication as ordered  - Provide emotional support with 1 on 1 interaction with staff  - Encourage recovery focused program/ addiction education  - Assess for verbalization of changing behaviors related to substance abuse  - Initiate consults and referrals as appropriate (Case Management, Spiritual Care, etc )  Outcome: Progressing  Goal: By discharge, will develop insight into their chemical dependency and sustain motivation to continue in recovery  Description: INTERVENTIONS:  - Attends all daily group sessions and scheduled AA groups  - Actively practices coping skills through participation in the therapeutic community and adherence to program rules  - Reviews and completes assignments from individual treatment plan  - Assist patient development of understanding of their personal cycle of addiction and relapse triggers  Outcome: Progressing  Goal: By discharge, patient will have ongoing treatment plan addressing chemical dependency  Description: INTERVENTIONS:  - Assist patient with resources and/or appointments for ongoing recovery based living  Outcome: Progressing

## 2023-01-29 NOTE — ASSESSMENT & PLAN NOTE
59-year-old male with past medical history of alcohol abuse, type 2 diabetes, paroxysmal A  fib presents with increased lethargy, shakiness concerning for withdrawals  Found to be hypoglycemic with blood glucose in the 60s  History of alcohol abuse, prior admissions for intoxication, hypoglycemia  D5 fluids, with KCl  Repeat CBC, BMP in a m    Accu-Cheks

## 2023-01-29 NOTE — ASSESSMENT & PLAN NOTE
Patient with chronic history of alcohol dependency and abuse, last drink was reportedly yesterday  Alcohol level on admission negative  Continue thiamine, multivitamins and folate  CIWA protocol  Given Librium, Ativan in the ED

## 2023-01-29 NOTE — ASSESSMENT & PLAN NOTE
History of paroxysmal A  fib, not on anticoagulation due to low Kimberly Vascor  Continue metoprolol 12 5 twice daily

## 2023-01-29 NOTE — ASSESSMENT & PLAN NOTE
Lab Results   Component Value Date    HGBA1C 6 7 (H) 07/30/2022     Currently with hypoglycemia, not on diabetic medications  Prior A1c of 6 7, repeat A1c  Currently on D5, sliding scale, Accu-Cheks and monitor for hyperglycemia

## 2023-01-29 NOTE — H&P
[address] [place] [address] Road 1970, 46 y o  male MRN: [id number]  Unit/Bed#: ED 25 Encounter: [id number]  Primary Care Provider: No primary care provider on file  Date and time admitted to hospital: 1/29/2023  6:55 AM    * Hypoglycemia  Assessment & Plan  80-year-old male with past medical history of alcohol abuse, type 2 diabetes, paroxysmal A  fib presents with increased lethargy, shakiness concerning for withdrawals  Found to be hypoglycemic with blood glucose in the 60s  History of alcohol abuse, prior admissions for intoxication, hypoglycemia  D5 fluids, with KCl  Repeat CBC, BMP in a m    Accu-Cheks    Alcohol withdrawal ([facility] [address] )  Assessment & Plan  Patient with chronic history of alcohol dependency and abuse, last drink was reportedly yesterday  Alcohol level on admission negative  Continue thiamine, multivitamins and folate  CIWA protocol  Given Librium, Ativan in the ED    Hypokalemia  Assessment & Plan  Suspect likely in the setting of alcohol abuse, replete and check magnesium    History of atrial fibrillation  Assessment & Plan  History of paroxysmal A  fib, not on anticoagulation due to low Kimberly Vascor  Continue metoprolol 12 5 twice daily    Type 2 diabetes mellitus with stage 3 chronic kidney disease, with long-term current use of insulin (HCC)  Assessment & Plan  Lab Results   Component Value Date    HGBA1C 6 7 (H) 07/30/2022     Currently with hypoglycemia, not on diabetic medications  Prior A1c of 6 7, repeat A1c  Currently on D5, sliding scale, Accu-Cheks and monitor for hyperglycemia      Essential hypertension  Assessment & Plan  Blood pressure initially hypertensive, currently controlled  Continue metoprolol, hold lisinopril, HCTZ    VTE Prophylaxis: Enoxaparin (Lovenox)  / sequential compression device   Code Status: FC  POLST: There is no POLST form on file for this patient (pre-hospital)      Anticipated Length of Stay:  Patient will be admitted on an Observation basis with an anticipated length of stay of  2 midnights  Justification for Hospital Stay: IV fluids, D5, monitor sugars and alcohol withdrawals    Total Time for Visit, including Counseling / Coordination of Care: 60 minutes  Greater than 50% of this total time spent on direct patient counseling and coordination of care  Chief Complaint:   Shakiness    History of Present Illness:    Summer Paula is a 46 y o  male who presents with lethargy  Patient with past medical history of alcohol abuse, type 2 diabetes, paroxysmal A  fib and hypertension  He presented today, brought in by family members noted to be lethargic, had shakiness in the ED  Found to be hypoglycemic, with alcohol levels negative  Initially suspect that patient may have been withdrawing, started on Librium, given Ativan and now lethargic  Unable to give ROS  Difficult to arouse and falls back to sleep  Review of Systems:    Review of Systems   Unable to perform ROS: Patient nonverbal        Past Medical and Surgical History:     Past Medical History:   Diagnosis Date   • Alcohol abuse    • Chronic pancreatitis ([facility] [address] )    • Diabetes mellitus ([facility] [address] )     Type 2   • Pancreatitis    • Paroxysmal A-fib ([facility] [address] )    • Thrombocytopenia ([facility] [address] )        Past Surgical History:   Procedure Laterality Date   • INCISION AND DRAINAGE OF WOUND N/A 8/16/2020    Procedure: INCISION AND DRAINAGE (I&D) HEAD/FACE;  Surgeon: Cecille Dos Santos DMD;  Location: [facility];  Service: Maxillofacial   • IR BIOPSY BONE MARROW  2/7/2019   • REMOVAL OF IMPACTED TOOTH - COMPLETELY BONY N/A 8/16/2020    Procedure: EXTRACTION TEETH MULTIPLE #2, 3;  Surgeon: Cecille Dos Santos DMD;  Location: [facility];  Service: Maxillofacial       Meds/Allergies:    Prior to Admission medications    Medication Sig Start Date End Date Taking?  Authorizing Provider   Insulin Pen Needle (Unifine Pentips) 32G X 4 MM MISC Inject as directed daily 9/4/22 1/29/23 Yes J Luis Gilmore MD   Blood Glucose Monitoring Suppl (Accu-Chek Guide) w/Device KIT Use 3 (three) times a day 9/4/22   Jacob Jensen MD   glucose blood (Accu-Chek Guide) test strip Check blood sugar 3 times a day 9/4/22   Jacob Jensen MD   Lancets (accu-chek multiclix) lancets Check blood sugar 3 times a day 9/4/22   Jacob Jensen MD   lisinopril-hydrochlorothiazide [facility]) 20-12 5 MG per tablet Take 2 tablets by mouth daily 11/24/22 12/24/22  Kaley Langford MD   metoprolol tartrate (LOPRESSOR) 25 mg tablet Take 0 5 tablets (12 5 mg total) by mouth every 12 (twelve) hours 7/30/22 8/29/22  Jimena Dias MD   thiamine (VITAMIN B1) 100 mg tablet Take 1 tablet (100 mg total) by mouth daily Do not start before January 3, 2023  1/3/23   Dami Pillai DO   metFORMIN (GLUCOPHAGE) 500 mg tablet Take 1 tablet (500 mg total) by mouth 2 (two) times a day with meals 7/30/22 7/30/22  Jimena Dias MD     I have reviewed home medications using allscripts      Allergies: No Known Allergies    Social History:     Marital Status: Single   Occupation:   Patient Pre-hospital Living Situation: home  Patient Pre-hospital Level of Mobility: ambulatory  Patient Pre-hospital Diet Restrictions: none  Substance Use History:   Social History     Substance and Sexual Activity   Alcohol Use Yes     Social History     Tobacco Use   Smoking Status Former   Smokeless Tobacco Never     Social History     Substance and Sexual Activity   Drug Use Yes   • Types: Cocaine       Family History:    Family History   Problem Relation Age of Onset   • Diabetes Mother    • Hypertension Mother    • Hyperlipidemia Father        Physical Exam:     Vitals:   Blood Pressure: 135/73 (01/29/23 0936)  Pulse: 68 (01/29/23 0936)  Temperature: 98 3 °F (36 8 °C) (01/29/23 0725)  Temp Source: Oral (01/29/23 0725)  Respirations: 16 (01/29/23 0936)  Weight - Scale: 68 7 kg (151 lb 7 3 oz) (01/29/23 0707)  SpO2: 100 % (01/29/23 0936)    Physical Exam  Vitals and nursing note reviewed  Constitutional:       Appearance: Normal appearance  He is normal weight  Comments: lethargic   HENT:      Head: Normocephalic and atraumatic  Eyes:      General: No scleral icterus  Conjunctiva/sclera: Conjunctivae normal    Cardiovascular:      Rate and Rhythm: Normal rate and regular rhythm  Pulmonary:      Effort: Pulmonary effort is normal  No respiratory distress  Breath sounds: No wheezing  Abdominal:      General: Bowel sounds are normal  There is no distension  Palpations: Abdomen is soft  Musculoskeletal:         General: No swelling  Right lower leg: No edema  Left lower leg: No edema  Skin:     General: Skin is warm and dry  Neurological:      General: No focal deficit present  Mental Status: Mental status is at baseline  Additional Data:     Lab Results: I have personally reviewed pertinent reports  Results from last 7 days   Lab Units 01/29/23  0727   WBC Thousand/uL 7 81   HEMOGLOBIN g/dL 13 2   HEMATOCRIT % 36 0*   PLATELETS Thousands/uL 199   NEUTROS PCT % 57   LYMPHS PCT % 28   MONOS PCT % 13*   EOS PCT % 2     Results from last 7 days   Lab Units 01/29/23  0727   SODIUM mmol/L 139   POTASSIUM mmol/L 2 7*   CHLORIDE mmol/L 98   CO2 mmol/L 33*   BUN mg/dL 9   CREATININE mg/dL 1 12   ANION GAP mmol/L 8   CALCIUM mg/dL 8 9   ALBUMIN g/dL 4 0   TOTAL BILIRUBIN mg/dL 1 05*   ALK PHOS U/L 60   ALT U/L 31   AST U/L 42*   GLUCOSE RANDOM mg/dL 62*     Results from last 7 days   Lab Units 01/29/23  0727   INR  0 96     Results from last 7 days   Lab Units 01/29/23  0701   POC GLUCOSE mg/dl 63*               Imaging: I have personally reviewed pertinent reports  XR chest 1 view portable   ED Interpretation by Basil Miller DO (01/29 4711)   Xray reviewed and independently interpreted by me: no acute infiltrates          EKG, Pathology, and Other Studies Reviewed on Admission:   · EKG: SR    Allscripts / Epic Records Reviewed:  Yes ** Please Note: This note has been constructed using a voice recognition system   **

## 2023-01-29 NOTE — ASSESSMENT & PLAN NOTE
Blood pressure initially hypertensive, currently controlled  Continue metoprolol, hold lisinopril, HCTZ

## 2023-01-29 NOTE — ED NOTES
Pt resting on stretcher with eyes closed respirations even and unlabored     Ashwin Adler RN  01/29/23 5271

## 2023-01-29 NOTE — NURSING NOTE
Patient brought up from ED, upon doing admission assessment patient appeared diaphoretic, lethargic and experiencing generalized weakness  Blood sugar was below 20  Per protocol administered four 4g tablets of glucose, patient was capable of chewing and swallowing them and was able to drink orange juice  After 15 minutes, blood sugar was below 35  Pharmacy was contacted to deliver four more 4g tablets to continue protocol  Medication was not able to be scanned into [facility] since it was hand sent and not pulled from [facility]  Georgia Harris MD, in regards to patients blood sugar  He instructed to give the four 4g tablets and increase fluids (D5 with 0 15 KCl) to 100ml/hr (originally 75ml/hr)  Total of 32g of glucose tablets have been given to patient

## 2023-01-30 LAB
ANION GAP SERPL CALCULATED.3IONS-SCNC: 6 MMOL/L (ref 4–13)
ANION GAP SERPL CALCULATED.3IONS-SCNC: 7 MMOL/L (ref 4–13)
BASOPHILS # BLD AUTO: 0.01 THOUSANDS/ÂΜL (ref 0–0.1)
BASOPHILS NFR BLD AUTO: 0 % (ref 0–1)
BUN SERPL-MCNC: 11 MG/DL (ref 5–25)
BUN SERPL-MCNC: 11 MG/DL (ref 5–25)
CALCIUM SERPL-MCNC: 8.1 MG/DL (ref 8.4–10.2)
CALCIUM SERPL-MCNC: 8.6 MG/DL (ref 8.4–10.2)
CHLORIDE SERPL-SCNC: 98 MMOL/L (ref 96–108)
CHLORIDE SERPL-SCNC: 98 MMOL/L (ref 96–108)
CO2 SERPL-SCNC: 28 MMOL/L (ref 21–32)
CO2 SERPL-SCNC: 30 MMOL/L (ref 21–32)
CREAT SERPL-MCNC: 1.08 MG/DL (ref 0.6–1.3)
CREAT SERPL-MCNC: 1.13 MG/DL (ref 0.6–1.3)
EOSINOPHIL # BLD AUTO: 0.12 THOUSAND/ÂΜL (ref 0–0.61)
EOSINOPHIL NFR BLD AUTO: 2 % (ref 0–6)
ERYTHROCYTE [DISTWIDTH] IN BLOOD BY AUTOMATED COUNT: 11.8 % (ref 11.6–15.1)
EST. AVERAGE GLUCOSE BLD GHB EST-MCNC: 246 MG/DL
GFR SERPL CREATININE-BSD FRML MDRD: 74 ML/MIN/1.73SQ M
GFR SERPL CREATININE-BSD FRML MDRD: 78 ML/MIN/1.73SQ M
GLUCOSE SERPL-MCNC: 108 MG/DL (ref 65–140)
GLUCOSE SERPL-MCNC: 111 MG/DL (ref 65–140)
GLUCOSE SERPL-MCNC: 122 MG/DL (ref 65–140)
GLUCOSE SERPL-MCNC: 133 MG/DL (ref 65–140)
GLUCOSE SERPL-MCNC: 185 MG/DL (ref 65–140)
GLUCOSE SERPL-MCNC: 195 MG/DL (ref 65–140)
GLUCOSE SERPL-MCNC: 198 MG/DL (ref 65–140)
GLUCOSE SERPL-MCNC: 274 MG/DL (ref 65–140)
GLUCOSE SERPL-MCNC: 286 MG/DL (ref 65–140)
GLUCOSE SERPL-MCNC: 83 MG/DL (ref 65–140)
GLUCOSE SERPL-MCNC: 84 MG/DL (ref 65–140)
HBA1C MFR BLD: 10.2 %
HCT VFR BLD AUTO: 33.7 % (ref 36.5–49.3)
HGB BLD-MCNC: 11.9 G/DL (ref 12–17)
IMM GRANULOCYTES # BLD AUTO: 0.05 THOUSAND/UL (ref 0–0.2)
IMM GRANULOCYTES NFR BLD AUTO: 1 % (ref 0–2)
LYMPHOCYTES # BLD AUTO: 2.14 THOUSANDS/ÂΜL (ref 0.6–4.47)
LYMPHOCYTES NFR BLD AUTO: 32 % (ref 14–44)
MAGNESIUM SERPL-MCNC: 1.9 MG/DL (ref 1.9–2.7)
MCH RBC QN AUTO: 32.2 PG (ref 26.8–34.3)
MCHC RBC AUTO-ENTMCNC: 35.3 G/DL (ref 31.4–37.4)
MCV RBC AUTO: 91 FL (ref 82–98)
MONOCYTES # BLD AUTO: 0.86 THOUSAND/ÂΜL (ref 0.17–1.22)
MONOCYTES NFR BLD AUTO: 13 % (ref 4–12)
NEUTROPHILS # BLD AUTO: 3.5 THOUSANDS/ÂΜL (ref 1.85–7.62)
NEUTS SEG NFR BLD AUTO: 52 % (ref 43–75)
NRBC BLD AUTO-RTO: 0 /100 WBCS
PLATELET # BLD AUTO: 182 THOUSANDS/UL (ref 149–390)
PMV BLD AUTO: 11.5 FL (ref 8.9–12.7)
POTASSIUM SERPL-SCNC: 3.2 MMOL/L (ref 3.5–5.3)
POTASSIUM SERPL-SCNC: 3.3 MMOL/L (ref 3.5–5.3)
RBC # BLD AUTO: 3.7 MILLION/UL (ref 3.88–5.62)
SODIUM SERPL-SCNC: 133 MMOL/L (ref 135–147)
SODIUM SERPL-SCNC: 134 MMOL/L (ref 135–147)
WBC # BLD AUTO: 6.68 THOUSAND/UL (ref 4.31–10.16)

## 2023-01-30 RX ORDER — LORAZEPAM 1 MG/1
2 TABLET ORAL ONCE
Status: COMPLETED | OUTPATIENT
Start: 2023-01-30 | End: 2023-01-30

## 2023-01-30 RX ADMIN — MULTIPLE VITAMINS W/ MINERALS TAB 1 TABLET: TAB ORAL at 10:30

## 2023-01-30 RX ADMIN — THIAMINE HCL TAB 100 MG 100 MG: 100 TAB at 10:30

## 2023-01-30 RX ADMIN — FOLIC ACID 1 MG: 1 TABLET ORAL at 10:30

## 2023-01-30 RX ADMIN — Medication 12.5 MG: at 17:12

## 2023-01-30 RX ADMIN — LORAZEPAM 2 MG: 1 TABLET ORAL at 00:51

## 2023-01-30 RX ADMIN — LISINOPRIL 40 MG: 20 TABLET ORAL at 10:30

## 2023-01-30 RX ADMIN — HYDROCHLOROTHIAZIDE 25 MG: 25 TABLET ORAL at 10:30

## 2023-01-30 RX ADMIN — INSULIN LISPRO 1 UNITS: 100 INJECTION, SOLUTION INTRAVENOUS; SUBCUTANEOUS at 11:38

## 2023-01-30 RX ADMIN — CHLORDIAZEPOXIDE HYDROCHLORIDE 25 MG: 25 CAPSULE ORAL at 13:19

## 2023-01-30 RX ADMIN — CHLORDIAZEPOXIDE HYDROCHLORIDE 25 MG: 25 CAPSULE ORAL at 21:05

## 2023-01-30 RX ADMIN — CHLORDIAZEPOXIDE HYDROCHLORIDE 25 MG: 25 CAPSULE ORAL at 05:24

## 2023-01-30 RX ADMIN — INSULIN LISPRO 2 UNITS: 100 INJECTION, SOLUTION INTRAVENOUS; SUBCUTANEOUS at 17:07

## 2023-01-30 RX ADMIN — Medication 12.5 MG: at 10:30

## 2023-01-30 RX ADMIN — ENOXAPARIN SODIUM 40 MG: 40 INJECTION SUBCUTANEOUS at 10:30

## 2023-01-30 NOTE — UTILIZATION REVIEW
Initial Clinical Review    WAS OBSERVATION 1/29/23 @ 1012 CONVERTED TO INPATIENT ADMISSION 1/31/23 @ 1109  DUE TO CONTINUED STAY REQUIRED TO CARE FOR PATIENT WITH DX: Hypoglycemia    Admission: Date/Time/Statement:   Admission Orders (From admission, onward)     Ordered        01/31/23 1109  Inpatient Admission  Once                      Orders Placed This Encounter   Procedures   • Inpatient Admission     Standing Status:   Standing     Number of Occurrences:   1     Order Specific Question:   Level of Care     Answer:   Med Surg [16]     Order Specific Question:   Estimated length of stay     Answer:   More than 2 Midnights     Order Specific Question:   Certification     Answer:   I certify that inpatient services are medically necessary for this patient for a duration of greater than two midnights  See H&P and MD Progress Notes for additional information about the patient's course of treatment  ED Arrival Information     Expected   -    Arrival   1/29/2023 06:53    Acuity   Emergent            Means of arrival   Walk-In    Escorted by   Family Member    Service   Hospitalist    Admission type   Emergency            Arrival complaint   low sugar           Chief Complaint   Patient presents with   • Hypoglycemia - Symptomatic   • Withdrawal - Alcohol     Pt reports drinks a bottle of alcohol daily last drink was at night, pt reprots lower bilaterally back pain and knee weakness  Pt also reports low blood sugar  Sugar of 63 in triage        Initial Presentation: 46 y o  male to ED via walk-in from home  Present with a PMHX of alcohol abuse, type 2 diabetes, paroxysmal A  fib and hypertension   to ED with lethargy; while in ED BS below 20 - adm glucose tablets and OJ - recheck was below 35 additional gluc tablets - additional tablets given as well as D5 with KCL @ 100 ml/hr (a total of 32 gm of glucose tablets were given  Admitted to OBS with DX: Hypoglycemia  on exam shakiness / tremos; diaphoretic; generalized weakness; anxious ; clouded orientation; hypoglycemic Gluc 62; K 2 7; CO2 33; AST 42; Heme A1C 6 7; /108; GCS 15; CIWA 6  Plan:  monitor labs; accu checks; monitor for s/ sx WD; cont  Thiamine / mv and folate; cont ivf; cardiopulm monitoring; CIWA monitoring     Date: 1/30/23    Tremors; diaphoretic; anxious; C/o pain; Insulin / diabetic teaching needs; K 3 3; Gluc 195 / 198 / 185 / 274; Heme A1C 10 2; CIWA 9  Plan:  monitor labs; accu checks; monitor for s/ sx WD; cont  Thiamine / mv and folate; cont ivf with KCL; cardiopulm monitoring; WA  moniotirng     Date 1/31/23  Changed to IP   Agitated; anxious; CIWA 6; c/o back and leg pain; Gluc 403 / 284/ 316   Plan: iv mg; monitor labs; accu checks; monitor for s/ sx WD; cont  Thiamine / mv and folate; cont ivf ; pain control     ENDOCRINE CONSULT: 55-year-old male with history of alcohol abuse, chronic pancreatitis, type 2 diabetes on insulin therapy who has had multiple episodes of both hypo and hyperglycemia since admission   -Diabetes is poorly controlled as evidenced by hemoglobin A1c of 10 2  Given the degree of hyperglycemia as well as history of chronic pancreatitis insulin therapy is the optimal choice however given concern for ongoing alcohol use he would be at risk for severe hypoglycemia as well  He will likely need supervision as far as fingerstick monitoring and insulin administration his concern  Discussed with the nurse -it appears that the patient lives with his parents and father is involved in his medical care  For now I would suggest starting Lantus 10 units daily ,start Humalog 2 units before meals  Monitor blood sugar before meals and bedtime and adjust      Date 2/1/23    Day 2  Sugars above goal (226 / 235 ); irritable   Plan: insulin changes; monitor bs w/ ssic; monitor labs; monitor for s/sx WD; cont   Thiamine; pain control         ED Triage Vitals   Temperature Pulse Respirations Blood Pressure SpO2   01/29/23 0725 01/29/23 0707 01/29/23 0707 01/29/23 0707 01/29/23 0707   98 3 °F (36 8 °C) 82 16 (!) 239/108 100 %      Temp Source Heart Rate Source Patient Position - Orthostatic VS BP Location FiO2 (%)   01/29/23 0725 01/29/23 0707 01/29/23 0829 01/29/23 0707 --   Oral Monitor Lying Right arm       Pain Score       01/29/23 1119       7          Wt Readings from Last 1 Encounters:   01/29/23 68 7 kg (151 lb 7 3 oz)     Additional Vital Signs:   Date/Time Temp Pulse Resp BP MAP (mmHg) SpO2 O2 Device Patient Position - Orthostatic VS   02/01/23 1102 97 9 °F (36 6 °C) 68 17 106/63 71 98 % None (Room air) Lying   02/01/23 0733 97 4 °F (36 3 °C) Abnormal  79 16 133/72 97 98 % None (Room air) Lying   02/01/23 0315 -- 73 -- 97/64 -- -- -- --   01/31/23 2315 -- 73 -- 97/64 -- -- -- --   01/31/23 2108 96 7 °F (35 9 °C) Abnormal  73 16 97/64 69 97 % None (Room air) Lying   01/31/23 1915 96 6 °F (35 9 °C) Abnormal  69 18 96/63 68 98 % None (Room air) Lying   01/31/23 1728 -- 68 -- 105/65 -- -- -- --   01/31/23 1427 97 °F (36 1 °C) Abnormal  68 18 99/60 70 97 % None (Room air) Lying   01/31/23 1129 97 4 °F (36 3 °C) Abnormal  78 17 108/72 84 100 % None (Room air) Lying         Date/Time Temp Pulse Resp BP MAP (mmHg) SpO2 O2 Device Patient Position - Orthostatic VS   01/31/23 0733 97 °F (36 1 °C) Abnormal  81 17 147/85 -- 97 % None (Room air) Lying   01/31/23 0112 97 2 °F (36 2 °C) Abnormal  76 18 130/74 -- 99 % None (Room air) Lying   01/30/23 2204 97 7 °F (36 5 °C) 67 20 131/77 -- 98 % None (Room air) Lying   01/30/23 1900 97 3 °F (36 3 °C) Abnormal  60 20 127/73 -- 97 % None (Room air) Lying   01/30/23 1516 97 °F (36 1 °C) Abnormal  75 16 127/78 93 100 % None (Room air) Lying         Date/Time Temp Pulse Resp BP MAP (mmHg) SpO2 O2 Device Patient Position - Orthostatic VS   01/30/23 0724 97 2 °F (36 2 °C) Abnormal  74 16 147/89 113 98 % None (Room air) Lying   01/30/23 0700 98 4 °F (36 9 °C) 65 20 148/73 -- 97 % None (Room air) Lying 01/30/23 0200 -- 70 -- 152/81 -- -- -- --   01/30/23 0044 -- 72 -- 164/86 -- -- -- --   01/29/23 2226 99 °F (37 2 °C) 71 16 135/78 96 99 % None (Room air) Lying   01/29/23 1828 97 3 °F (36 3 °C) Abnormal  69 20 171/86 Abnormal  122 100 % None (Room air) Lying   01/29/23 1747 -- 67 -- 159/86 -- -- -- --   01/29/23 1700 -- -- -- 159/86 121 -- -- Lying   01/29/23 1420 97 7 °F (36 5 °C) 62 18 151/76 115 98 % None (Room air) Lying   01/29/23 1401 -- 72 17 142/80 -- 98 % None (Room air) Lying   01/29/23 1240 -- 70 18 148/68 98 98 % None (Room air) Lying   01/29/23 1141 -- 70 16 176/81 Abnormal  -- 100 % None (Room air) Lying   01/29/23 1100 -- 70 16 155/80 111 100 % None (Room air) Lying   01/29/23 0936 -- 68 16 135/73 -- 100 % None (Room air) Lying   01/29/23 0829 -- 75 18 206/89 Abnormal  -- 100 % None (Room air) Lying   01/29/23 0725 98 3 °F (36 8 °C) -- -- -- -- -- -- --   01/29/23 0707 -- 82 16 239/108 Abnormal  -- 100 % None (Room air) --          Default Flowsheet Data (all recorded)    CIWA-Ar Score    Row Name 02/01/23 1102 02/01/23 0733 02/01/23 0315 01/31/23 2315 01/31/23 2108   CIWA-Ar   /63  -/72  -LP 97/64  -JF 97/64  -JF 97/64  -BL   Pulse 68  -LP 79  -LP 73  -JF 73  -JF 73  -BL   Nausea and Vomiting -- -- 0  -JF 0  -JF --   Tactile Disturbances -- -- 0  -JF 0  -JF --   Tremor -- -- 0  -JF 0  -JF --   Auditory Disturbances -- -- 0  -JF 0  -JF --   Paroxysmal Sweats -- -- 0  -JF 0  -JF --   Visual Disturbances -- -- 0  -JF 0  -JF --   Anxiety -- -- 0  -JF 0  -JF --   Headache, Fullness in Head -- -- 0  -JF 0  -JF --   Agitation -- -- 0  -JF 0  -JF --   Orientation and Clouding of Sensorium -- -- 0  -JF 0  -JF --   CIWA-Ar Total -- -- 0  -JF 0  -JF --           Default Flowsheet Data (all recorded)    CIWA-Ar Score    Row Name 01/31/23 1427 01/31/23 1129 01/31/23 0733 01/31/23 0112 01/30/23 2204   CIWA-Ar   BP 99/60  -/72  -/85  -/74  -/77  -TS   Pulse 68  -BL 78  -BL 81  -KV 76  -AP 67  -TS   Nausea and Vomiting 0  -CD 0  -CD 0  -CD 0  -AP 0  -AP   Tactile Disturbances 0  -CD 0  -CD 0  -CD 0  -AP 0  -AP   Tremor 0  -CD 0  -CD 0  -CD 0  -AP 0  -AP   Auditory Disturbances 0  -CD 0  -CD 0  -CD 0  -AP 0  -AP   Paroxysmal Sweats 0  -CD 0  -CD 0  -CD 0  -AP 0  -AP   Visual Disturbances 0  -CD 0  -CD 0  -CD 0  -AP 0  -AP   Anxiety 0  -CD 0  -CD 0  -CD 3  -AP 0  -AP   Headache, Fullness in Head 0  -CD 0  -CD 0  -CD 0  -AP 0  -AP   Agitation 0  -CD 0  -CD 0  -CD 3  agitated because he wants pain medication  -AP 0  -AP   Orientation and Clouding of Sensorium 0  -CD 0  -CD 0  -CD 0  -AP 0  -AP   CIWA-Ar Total 0  -CD 0  -CD 0  -CD 6  -AP 0  -AP   Row Name 01/30/23 1900 01/30/23 1516 01/30/23 1500 01/30/23 1100 01/30/23 0724   CIWA-Ar   /73  -/78  -BL -- -- 147/89  -LC   Pulse 60  -TS 75  -BL -- -- 74  -LC   Nausea and Vomiting 0  -AP -- 0  -BM 0  -BM 0  -BM   Tactile Disturbances 0  -AP -- 0  -BM 0  -BM 0  -BM   Tremor 0  -AP -- 1  -BM 1  -BM 1  -BM   Auditory Disturbances 0  -AP -- 0  -BM 0  -BM 0  -BM   Paroxysmal Sweats 0  -AP -- 1  -BM 1  -BM 1  -BM   Visual Disturbances 0  -AP -- 0  -BM 0  -BM 0  -BM   Anxiety 0  -AP -- 0  -BM 0  -BM 0  -BM   Headache, Fullness in Head 0  -AP -- 0  -BM 0  -BM 0  -BM   Agitation 0  -AP -- 0  -BM 0  -BM 0  -BM   Orientation and Clouding of Sensorium 0  -AP -- 0  -BM 0  -BM 0  -BM   CIWA-Ar Total 0  -AP -- 2  -BM 2  -BM 2  -BM   Row Name 01/30/23 0700 01/30/23 0200 01/30/23 0044 01/29/23 2232 01/29/23 2226   ERIC-Ar   /73  -/81  -/86  -KH --  -/78  -KM   Pulse 65  -TS 70  -KH 72  -KH --  -KM 71  -KM   Nausea and Vomiting -- 0  -KH 0  -KH -- 0  -KH   Tactile Disturbances -- 0  -KH 0  -KH -- 0  -KH   Tremor -- 4  -KH 7  -KH -- 7  -KH   Auditory Disturbances -- 0  -KH 0  -KH -- 0  -KH   Paroxysmal Sweats -- 1  -KH 1  -KH -- 1  -KH   Visual Disturbances -- 0  -KH 0  -KH -- 0  -KH   Anxiety -- 0  -KH 1  -KH -- 1  -KH Headache, Fullness in Head -- 0  -KH 0  -KH -- 0  -KH   Agitation -- 0  -KH 0  -KH -- 0  -KH   Orientation and Clouding of Sensorium -- 0  -KH 0  -KH -- 0  -KH   CIWA-Ar Total -- 5  -KH 9  -KH -- 9  -KH   Row Name 23 1828 23 1747 23 1700 23 1420 23 1401   CIWA-Ar   /86 Abnormal   -/86  -/86  -/76  -/80  -LB   Pulse 69  -KM 67  -BM -- 62  -LP 72  -LB   Nausea and Vomiting 0  -KH 0  -BM -- 0  -BM --   Tactile Disturbances 0  -KH 0  -BM -- 0  -BM --   Tremor 4  -KH 4  -BM -- 4  -BM --   Auditory Disturbances 0  -KH 0  -BM -- 0  -BM --   Paroxysmal Sweats 1  -KH 1  -BM -- 4  -BM --   Visual Disturbances 0  -KH 0  -BM -- 0  -BM --   Anxiety 0  -KH 0  -BM -- 1  -BM --   Headache, Fullness in Head 0  -KH 0  -BM -- 0  -BM --   Agitation 0  -KH 0  -BM -- 0  -BM --   Orientation and Clouding of Sensorium 0  -KH 0  -BM -- 0  -BM --   CIWA-Ar Total 5  -KH 5  -BM -- 9  -BM --   Row Name 23 1244 23 1240 23 1141 23 1100 23 0936   CIWA-Ar   BP --  - 148/68  - 176/81 Abnormal   -/80  - 135/73  -JOANNA   Pulse --  - 70  -MH 70  -MH 70  - 68  -JOANNA   Nausea and Vomiting --  - -- -- 0  - --   Tactile Disturbances --  - -- -- 0  -MH --   Tremor --  -MH -- -- 4  -MH --   Auditory Disturbances --  -MH -- -- 0  -MH --   Paroxysmal Sweats --  - -- -- 0  -MH --   Visual Disturbances --  - -- -- 0  -MH --   Anxiety --  -MH -- -- 1  -MH --   Headache, Fullness in Head --  -MH -- -- 0  -MH --   Agitation --  -MH -- -- 0  -MH --   Orientation and Clouding of Sensorium --  -MH -- -- 1  -MH --   CIWA-Ar Total --  -MH -- -- 6  -MH --   Row Name 23 0829 23 0707      CIWA-Ar   /89 Abnormal   -JOANNA 239/108 Abnormal   -JOANNA      Pulse 75  -JOANNA 82  -JOANNA                Sinus rhythm  Cannot rule out Inferior infarct (cited on or before 2023)  T wave abnormality, consider anterior ischemia  Abnormal ECG      1/29/23 0931  Normal sinus rhythm  T wave abnormality, consider inferior ischemia  T wave abnormality, consider anterolateral ischemia  Abnormal ECG  When compared with ECG of 29-JAN-2023 07:28,  No significant change was found      Pertinent Labs/Diagnostic Test Results:   XR spine lumbar 2 or 3 views injury   Final Result by Skylar Gilbert MD (01/31 1432)      Unremarkable radiographic appearance of lumbar spine  Pancreatic calcifications consistent with the sequela of chronic pancreatitis  Workstation performed: [id number]         XR chest 1 view portable   ED Interpretation by Zak Mitchell DO (01/29 7322)   Xray reviewed and independently interpreted by me: no acute infiltrates      Final Result by Rex Hook MD (01/29 1022)      No acute cardiopulmonary disease  Workstation performed: [id number]               Results from last 7 days   Lab Units 02/01/23 0424 01/31/23  0114 01/30/23  0430 01/29/23  1139 01/29/23  0727   WBC Thousand/uL 7 81 7 94 6 68  --  7 81   HEMOGLOBIN g/dL 12 0 12 1 11 9*  --  13 2   HEMATOCRIT % 34 6* 33 2* 33 7*  --  36 0*   PLATELETS Thousands/uL 168 166 182   < > 199   NEUTROS ABS Thousands/µL  --  4 89 3 50  --  4 45    < > = values in this interval not displayed           Results from last 7 days   Lab Units 02/01/23 0424 01/31/23  0114 01/30/23  1212 01/30/23  0430 01/29/23  0727   SODIUM mmol/L 131* 131* 133* 134* 139   POTASSIUM mmol/L 3 8 3 8 3 3* 3 2* 2 7*   CHLORIDE mmol/L 100 100 98 98 98   CO2 mmol/L 26 24 28 30 33*   ANION GAP mmol/L 5 7 7 6 8   BUN mg/dL 28* 18 11 11 9   CREATININE mg/dL 1 40* 1 25 1 08 1 13 1 12   EGFR ml/min/1 73sq m 57 65 78 74 75   CALCIUM mg/dL 8 3* 8 3* 8 6 8 1* 8 9   MAGNESIUM mg/dL 1 9 1 6*  --  1 9  --      Results from last 7 days   Lab Units 02/01/23 0424 01/31/23  0114 01/29/23  0727   AST U/L 17 21 42*   ALT U/L 19 22 31   ALK PHOS U/L 55 55 60   TOTAL PROTEIN g/dL 6 0* 5 8* 6 8 ALBUMIN g/dL 3 3* 3 3* 4 0   TOTAL BILIRUBIN mg/dL 1 09* 2 46* 1 05*     Results from last 7 days   Lab Units 02/01/23  1055 02/01/23  0628 01/31/23  1534 01/31/23  1107 01/31/23  0632 01/30/23  2039 01/30/23  1425 01/30/23  1205 01/30/23  1038 01/30/23  0816 01/30/23  0601 01/30/23  0406   POC GLUCOSE mg/dl 235* 226* 202* 284* 316* 274* 286* 198* 185* 111 133 84     Results from last 7 days   Lab Units 02/01/23  0424 01/31/23  0114 01/30/23  1212 01/30/23  0430 01/29/23  0727   GLUCOSE RANDOM mg/dL 220* 403* 195* 108 62*       BETA-HYDROXYBUTYRATE   Date Value Ref Range Status   02/02/2019 0 16 0 02 - 0 27 mmol/L Final          Results from last 7 days   Lab Units 01/29/23  1139 01/29/23  0936 01/29/23  0727   HS TNI 0HR ng/L  --   --  11   HS TNI 2HR ng/L  --  9  --    HSTNI D2 ng/L  --  -2  --    HS TNI 4HR ng/L 10  --   --    HSTNI D4 ng/L -1  --   --          Results from last 7 days   Lab Units 01/29/23  0727   PROTIME seconds 12 8   INR  0 96   PTT seconds 23             Results from last 7 days   Lab Units 01/29/23  0727   ETHANOL LVL mg/dL <10       ED Treatment:   Medication Administration from 01/29/2023 0653 to 01/29/2023 1411       Date/Time Order Dose Route Action     01/29/2023 0822 EST thiamine (VITAMIN B1) 483 mg, folic acid 1 mg, magnesium sulfate 2 g in dextrose 5 % and sodium chloride 0 9 % 1,000 mL infusion -- Intravenous New Bag     01/29/2023 0820 EST potassium chloride (K-DUR,KLOR-CON) CR tablet 20 mEq 20 mEq Oral Given     01/29/2023 2764 EST potassium chloride 20 mEq IVPB (premix) 20 mEq Intravenous New Bag     01/29/2023 0821 EST chlordiazePOXIDE (LIBRIUM) capsule 50 mg 50 mg Oral Given     01/29/2023 0819 EST LORazepam (ATIVAN) injection 1 mg 1 mg Intravenous Given     01/29/2023 1241 EST dextrose 5 % with KCl 20 mEq/L infusion (premix) 75 mL/hr Intravenous New Bag     01/29/2023 1143 EST insulin lispro (HumaLOG) 100 units/mL subcutaneous injection 1-5 Units 1 Units Subcutaneous Given 01/29/2023 1119 EST acetaminophen (TYLENOL) tablet 650 mg 650 mg Oral Given     01/29/2023 1121 EST enoxaparin (LOVENOX) subcutaneous injection 40 mg 40 mg Subcutaneous Given     01/29/2023 1117 EST thiamine tablet 100 mg 100 mg Oral Given     26/32/4313 4925 EST folic acid (FOLVITE) tablet 1 mg 1 mg Oral Given     01/29/2023 1118 EST multivitamin-minerals (CENTRUM) tablet 1 tablet 1 tablet Oral Given     01/29/2023 1119 EST metoprolol tartrate (LOPRESSOR) partial tablet 12 5 mg 12 5 mg Oral Given          Present on Admission:  • Essential hypertension  • Hypoglycemia  • Hypokalemia  • Bilateral hearing loss  • Chronic low back pain  • Chronic pancreatitis (HCC)      Admitting Diagnosis: Alcohol withdrawal (HCC) [F10 939]  Hypokalemia [E87 6]  Alcohol abuse [F10 10]  Low blood sugar [E16 2]  Generalized weakness [R53 1]  Acute exacerbation of chronic low back pain [M54 50, G89 29]     Age/Sex: 46 y o  male     Admission Orders: SCD's; cardiopulm monitoring; Consistent Carbohydrate Diet  Accu-checks ACHS  Scheduled Medications:  LORazepam (ATIVAN) tablet 2 mg  Dose: 2 mg  Freq: Once Route: PO  Indications of Use: ALCOHOL WITHDRAWAL SYNDROME  Start: 01/30/23 0100 End: 01/30/23 0051    potassium chloride 20 mEq IVPB (premix)  Dose: 20 mEq  Freq: Once Route: IV  Last Dose: Stopped (01/29/23 1415)  Start: 01/29/23 0815 End: 01/29/23 1415    chlordiazePOXIDE, 25 mg, Oral, Q8H JAISON  enoxaparin, 40 mg, Subcutaneous, Daily  folic acid, 1 mg, Oral, Daily  lisinopril, 40 mg, Oral, Daily   And  hydrochlorothiazide, 25 mg, Oral, Daily  insulin lispro, 1-5 Units, Subcutaneous, TID AC  metoprolol tartrate, 12 5 mg, Oral, BID  multivitamin-minerals, 1 tablet, Oral, Daily  thiamine, 100 mg, Oral, Daily    magnesium sulfate 2 g/50 mL IVPB (premix) 2 g  Dose: 2 g  Freq:  Once Route: IV  Start: 01/31/23 0800 End: 01/31/23 0944      Continuous IV Infusions:  dextrose 5 % with KCl 20 mEq/L, 100 mL/hr, Intravenous, Continuous    thiamine (VITAMIN B1) 346 mg, folic acid 1 mg, magnesium sulfate 2 g in dextrose 5 % and sodium chloride 0 9 % 1,000 mL infusion  Rate: 250 mL/hr Freq: Continuous Route: IV  Last Dose: Stopped (01/29/23 1416)  Start: 01/29/23 0730 End: 01/29/23 1221    sodium chloride 0 9 % infusion  Rate: 75 mL/hr Dose: 75 mL/hr  Freq: Continuous Route: IV  Indications of Use: IV Hydration  Last Dose: 75 mL/hr (01/31/23 1106)  Start: 01/31/23 1100 End: 01/31/23 2305      dextrose 5 % with KCl 20 mEq/L infusion (premix)  Rate: 50 mL/hr Dose: 50 mL/hr  Freq: Continuous Route: IV  Indications of Use: IV Hydration  Last Dose: Stopped (01/30/23 1437)  Start: 01/29/23 1015 End: 01/30/23 1428    PRN Meds:  acetaminophen, 650 mg, Oral, Q6H PRN  hydrALAZINE, 5 mg, Intravenous, Q6H PRN  ondansetron, 4 mg, Intravenous, Q6H PRN  traMADol (ULTRAM) tablet 50 mg (1/31 rec'd x 2 today)           Network Utilization Review Department  ATTENTION: Please call with any questions or concerns to [phone] and carefully listen to the prompts so that you are directed to the right person  All voicemails are confidential   Heather Mcknight all requests for admission clinical reviews, approved or denied determinations and any other requests to dedicated fax number below belonging to the campus where the patient is receiving treatment   List of dedicated fax numbers for the Facilities:  [address] [facility] [address] DENIALS (Administrative/Medical Necessity) [phone]   [address] [place] [address] (Maternity/NICU/Pediatrics) [phone]   [address] [facility] [address] [place] [address] [id number] [facility]  [address] [place] [address] [facility] [address] [facility] [phone]

## 2023-01-30 NOTE — PLAN OF CARE
Problem: SUBSTANCE USE/ABUSE  Goal: Will have no detox symptoms and will verbalize plan for changing substance-related behavior  Description: INTERVENTIONS:  - Monitor physical status and assess for symptoms of withdrawal  - Administer medication as ordered  - Provide emotional support with 1 on 1 interaction with staff  - Encourage recovery focused program/ addiction education  - Assess for verbalization of changing behaviors related to substance abuse  - Initiate consults and referrals as appropriate (Case Management, Spiritual Care, etc )  Outcome: Progressing  Goal: By discharge, will develop insight into their chemical dependency and sustain motivation to continue in recovery  Description: INTERVENTIONS:  - Attends all daily group sessions and scheduled AA groups  - Actively practices coping skills through participation in the therapeutic community and adherence to program rules  - Reviews and completes assignments from individual treatment plan  - Assist patient development of understanding of their personal cycle of addiction and relapse triggers  Outcome: Progressing  Goal: By discharge, patient will have ongoing treatment plan addressing chemical dependency  Description: INTERVENTIONS:  - Assist patient with resources and/or appointments for ongoing recovery based living  Outcome: Progressing     Problem: Nutrition/Hydration-ADULT  Goal: Nutrient/Hydration intake appropriate for improving, restoring or maintaining nutritional needs  Description: Monitor and assess patient's nutrition/hydration status for malnutrition  Collaborate with interdisciplinary team and initiate plan and interventions as ordered  Monitor patient's weight and dietary intake as ordered or per policy  Utilize nutrition screening tool and intervene as necessary  Determine patient's food preferences and provide high-protein, high-caloric foods as appropriate       INTERVENTIONS:  - Monitor oral intake, urinary output, labs, and treatment plans  - Assess nutrition and hydration status and recommend course of action  - Evaluate amount of meals eaten  - Assist patient with eating if necessary   - Allow adequate time for meals  - Recommend/ encourage appropriate diets, oral nutritional supplements, and vitamin/mineral supplements  - Order, calculate, and assess calorie counts as needed  - Recommend, monitor, and adjust tube feedings and TPN/PPN based on assessed needs  - Assess need for intravenous fluids  - Provide specific nutrition/hydration education as appropriate  - Include patient/family/caregiver in decisions related to nutrition  Outcome: Progressing     Problem: METABOLIC, FLUID AND ELECTROLYTES - ADULT  Goal: Electrolytes maintained within normal limits  Description: INTERVENTIONS:  - Monitor labs and assess patient for signs and symptoms of electrolyte imbalances  - Administer electrolyte replacement as ordered  - Monitor response to electrolyte replacements, including repeat lab results as appropriate  - Instruct patient on fluid and nutrition as appropriate  Outcome: Progressing  Goal: Fluid balance maintained  Description: INTERVENTIONS:  - Monitor labs   - Monitor I/O and WT  - Instruct patient on fluid and nutrition as appropriate  - Assess for signs & symptoms of volume excess or deficit  Outcome: Progressing  Goal: Glucose maintained within target range  Description: INTERVENTIONS:  - Monitor Blood Glucose as ordered  - Assess for signs and symptoms of hyperglycemia and hypoglycemia  - Administer ordered medications to maintain glucose within target range  - Assess nutritional intake and initiate nutrition service referral as needed  Outcome: Progressing

## 2023-01-30 NOTE — PROGRESS NOTES
[address] [facility]  Progress Note - [address] 1970, 46 y o  male MRN: [id number]  Unit/Bed#: E2 -01 Encounter: [id number]  Primary Care Provider: No primary care provider on file  Date and time admitted to hospital: 1/29/2023  6:55 AM    * Hypoglycemia  Assessment & Plan  · 77-year-old male with past medical history of alcohol abuse, type 2 diabetes, paroxysmal A  fib presented with increased lethargy, shakiness concerning for withdrawals, found to be hypoglycemic with blood sugars in the 60s  · Patient has frequent hospitalizations for hypoglycemia and intoxication  He does not follow with a PCP education compliance is unclear  He is insistent that he requires insulin  · Continue D5 fluids, with KCl  · Repeat CBC, BMP in a m  · Accu-Cheks    Alcohol withdrawal ([facility] )  Assessment & Plan  · Patient with chronic history of alcohol dependency and abuse, last drink was reportedly yesterday  · Alcohol level on admission negative  · Continue thiamine, multivitamins and folate  · CIWA protocol  · Continue librium 25 mg q 8 hrs     Type 2 diabetes mellitus with stage 3 chronic kidney disease, with long-term current use of insulin ([facility] )  Assessment & Plan  Lab Results   Component Value Date    HGBA1C 10 2 (H) 01/30/2023   · Patient presented with hypoglycemia  He reports checking blood sugar every 2-3 days   He reports taking insulin 26 units every day  · A1c 10 2   · Continue on D5, sliding scale, Accu-Cheks and monitor for hyperglycemia      History of atrial fibrillation  Assessment & Plan  · History of paroxysmal A  fib, not on anticoagulation due to low Kimberly Vascor  · Continue metoprolol 12 5 twice daily    Essential hypertension  Assessment & Plan  · Blood pressure initially hypertensive, improved to -150  · Continue metoprolol, lisinopril, HCTZ    Hypokalemia  Assessment & Plan  · Suspect likely in the setting of alcohol abuse  · Continue D5W with       VTE Pharmacologic Prophylaxis:   Moderate Risk (Score 3-4) - Pharmacological DVT Prophylaxis Ordered: enoxaparin (Lovenox)  Patient Centered Rounds: I performed bedside rounds with nursing staff today  Discussions with Specialists or Other Care Team Provider: none    Education and Discussions with Family / Patient: Patient declined call to   Time Spent for Care: 30 minutes  More than 50% of total time spent on counseling and coordination of care as described above  Current Length of Stay: 0 day(s)  Current Patient Status: Observation   Certification Statement: The patient will continue to require additional inpatient hospital stay due to hypoglycemia and alcohol withdrawal requiring restarting insulin   Discharge Plan: Anticipate discharge in 24-48 hrs to home  Code Status: Level 1 - Full Code    Subjective:   Patient was seen laying in bed today  He reports feeling better today but has some knee and back pain  We discussed how he takes his insulin at home  Patient states that he states 26 units of insulin daily and checks his blood sugars every 2 to 3 days  We discussed how he needs to check his sugars more often due to risk of hypoglycemia  Objective:     Vitals:   Temp (24hrs), Av 9 °F (36 6 °C), Min:97 2 °F (36 2 °C), Max:99 °F (37 2 °C)    Temp:  [97 2 °F (36 2 °C)-99 °F (37 2 °C)] 97 2 °F (36 2 °C)  HR:  [62-74] 74  Resp:  [16-20] 16  BP: (135-171)/(73-89) 147/89  SpO2:  [97 %-100 %] 98 %  Body mass index is 22 37 kg/m²  Input and Output Summary (last 24 hours): Intake/Output Summary (Last 24 hours) at 2023 1414  Last data filed at 2023 1319  Gross per 24 hour   Intake 1000 ml   Output --   Net 1000 ml       Physical Exam:   Physical Exam  Vitals and nursing note reviewed  Constitutional:       Appearance: Normal appearance  Eyes:      General: No scleral icterus  Cardiovascular:      Rate and Rhythm: Normal rate and regular rhythm     Pulmonary:      Effort: Pulmonary effort is normal       Breath sounds: Normal breath sounds  Abdominal:      General: Abdomen is flat  Bowel sounds are normal       Palpations: Abdomen is soft  Tenderness: There is no abdominal tenderness  Musculoskeletal:      Right lower leg: No edema  Left lower leg: No edema  Skin:     General: Skin is warm and dry  Neurological:      Mental Status: He is alert  Mental status is at baseline  Psychiatric:         Mood and Affect: Mood normal          Behavior: Behavior normal           Additional Data:     Labs:  Results from last 7 days   Lab Units 01/30/23  0430   WBC Thousand/uL 6 68   HEMOGLOBIN g/dL 11 9*   HEMATOCRIT % 33 7*   PLATELETS Thousands/uL 182   NEUTROS PCT % 52   LYMPHS PCT % 32   MONOS PCT % 13*   EOS PCT % 2     Results from last 7 days   Lab Units 01/30/23  1212 01/30/23  0430 01/29/23  0727   SODIUM mmol/L 133*   < > 139   POTASSIUM mmol/L 3 3*   < > 2 7*   CHLORIDE mmol/L 98   < > 98   CO2 mmol/L 28   < > 33*   BUN mg/dL 11   < > 9   CREATININE mg/dL 1 08   < > 1 12   ANION GAP mmol/L 7   < > 8   CALCIUM mg/dL 8 6   < > 8 9   ALBUMIN g/dL  --   --  4 0   TOTAL BILIRUBIN mg/dL  --   --  1 05*   ALK PHOS U/L  --   --  60   ALT U/L  --   --  31   AST U/L  --   --  42*   GLUCOSE RANDOM mg/dL 195*   < > 62*    < > = values in this interval not displayed       Results from last 7 days   Lab Units 01/29/23  0727   INR  0 96     Results from last 7 days   Lab Units 01/30/23  1205 01/30/23  1038 01/30/23  0816 01/30/23  0601 01/30/23  0406 01/30/23  0159 01/30/23  0030 01/29/23  2228 01/29/23  2025 01/29/23  1827 01/29/23  1623 01/29/23  1551   POC GLUCOSE mg/dl 198* 185* 111 133 84 83 122 205* 328* 282* 115 41*     Results from last 7 days   Lab Units 01/30/23  0430   HEMOGLOBIN A1C % 10 2*           Lines/Drains:  Invasive Devices     Peripheral Intravenous Line  Duration           Peripheral IV 01/29/23 Right Antecubital 1 day                      Imaging: No pertinent imaging reviewed  Recent Cultures (last 7 days):         Last 24 Hours Medication List:   Current Facility-Administered Medications   Medication Dose Route Frequency Provider Last Rate   • acetaminophen  650 mg Oral Q6H PRN Ayaka Yu MD     • chlordiazePOXIDE  25 mg Oral Q8H [address] Ayaka Yu MD     • dextrose 5 % with KCl 20 mEq/L  75 mL/hr Intravenous Continuous Cyndy Lui PA-C 75 mL/hr (01/30/23 1319)   • enoxaparin  40 mg Subcutaneous Daily Ayaka Yu MD     • folic acid  1 mg Oral Daily Ayaka Yu MD     • hydrALAZINE  5 mg Intravenous Q6H PRN Ayaka Yu MD     • lisinopril  40 mg Oral Daily Ayaka Yu MD      And   • hydrochlorothiazide  25 mg Oral Daily Ayaka Yu MD     • insulin lispro  1-5 Units Subcutaneous TID AC Ayaka Yu MD     • metoprolol tartrate  12 5 mg Oral BID Ayaka Yu MD     • multivitamin-minerals  1 tablet Oral Daily Ayaka Yu MD     • ondansetron  4 mg Intravenous Q6H PRN Ayaka Yu MD     • thiamine  100 mg Oral Daily Ayaka Yu MD          Today, Patient Was Seen By: Cyndy Lui PA-C    **Please Note: This note may have been constructed using a voice recognition system  **

## 2023-01-30 NOTE — ASSESSMENT & PLAN NOTE
· Patient with chronic history of alcohol dependency and abuse, last drink was reportedly yesterday  · Alcohol level on admission negative  · Continue thiamine, multivitamins and folate  · [facility] protocol  · Continue librium 25 mg q 8 hrs

## 2023-01-30 NOTE — ASSESSMENT & PLAN NOTE
· 26-year-old male with past medical history of alcohol abuse, type 2 diabetes, paroxysmal A  fib presented with increased lethargy, shakiness concerning for withdrawals, found to be hypoglycemic with blood sugars in the 60s  · Patient has frequent hospitalizations for hypoglycemia and intoxication  He does not follow with a PCP education compliance is unclear  He is insistent that he requires insulin  · Continue D5 fluids, with KCl  · Repeat CBC, BMP in a m    · Accu-Cheks

## 2023-01-30 NOTE — PLAN OF CARE
Problem: METABOLIC, FLUID AND ELECTROLYTES - ADULT  Goal: Electrolytes maintained within normal limits  Description: INTERVENTIONS:  - Monitor labs and assess patient for signs and symptoms of electrolyte imbalances  - Administer electrolyte replacement as ordered  - Monitor response to electrolyte replacements, including repeat lab results as appropriate  - Instruct patient on fluid and nutrition as appropriate  Outcome: Progressing  Goal: Fluid balance maintained  Description: INTERVENTIONS:  - Monitor labs   - Monitor I/O and WT  - Instruct patient on fluid and nutrition as appropriate  - Assess for signs & symptoms of volume excess or deficit  Outcome: Progressing  Goal: Glucose maintained within target range  Description: INTERVENTIONS:  - Monitor Blood Glucose as ordered  - Assess for signs and symptoms of hyperglycemia and hypoglycemia  - Administer ordered medications to maintain glucose within target range  - Assess nutritional intake and initiate nutrition service referral as needed  Outcome: Progressing     Problem: Nutrition/Hydration-ADULT  Goal: Nutrient/Hydration intake appropriate for improving, restoring or maintaining nutritional needs  Description: Monitor and assess patient's nutrition/hydration status for malnutrition  Collaborate with interdisciplinary team and initiate plan and interventions as ordered  Monitor patient's weight and dietary intake as ordered or per policy  Utilize nutrition screening tool and intervene as necessary  Determine patient's food preferences and provide high-protein, high-caloric foods as appropriate       INTERVENTIONS:  - Monitor oral intake, urinary output, labs, and treatment plans  - Assess nutrition and hydration status and recommend course of action  - Evaluate amount of meals eaten  - Assist patient with eating if necessary   - Allow adequate time for meals  - Recommend/ encourage appropriate diets, oral nutritional supplements, and vitamin/mineral supplements  - Order, calculate, and assess calorie counts as needed  - Recommend, monitor, and adjust tube feedings and TPN/PPN based on assessed needs  - Assess need for intravenous fluids  - Provide specific nutrition/hydration education as appropriate  - Include patient/family/caregiver in decisions related to nutrition  Outcome: Progressing     Problem: COPING  Goal: Pt/Family able to verbalize concerns and demonstrate effective coping strategies  Description: INTERVENTIONS:  - Assist patient/family to identify coping skills, available support systems and cultural and spiritual values  - Provide emotional support, including active listening and acknowledgement of concerns of patient and caregivers  - Reduce environmental stimuli, as able  - Provide patient education  - Assess for spiritual pain/suffering and initiate spiritual care, including notification of [facility] or bjorn based community as needed  - Assess effectiveness of coping strategies  Outcome: Progressing  Goal: Will report anxiety at manageable levels  Description: INTERVENTIONS:  - Administer medication as ordered  - Teach and encourage coping skills  - Provide emotional support  - Assess patient/family for anxiety and ability to cope  Outcome: Progressing     Problem: DECISION MAKING  Goal: Pt/Family able to effectively weigh alternatives and participate in decision making related to treatment and care  Description: INTERVENTIONS:  - Identify decision maker  - Determine when there are differences among patient's view, family's view, and healthcare provider's view of patient condition and care goals  - Facilitate patient/family articulation of goals for care  - Help patient/family identify pros/cons of alternative solutions  - Provide information as requested by patient/family  - Respect patient/family rights related to privacy and sharing information   - Serve as a liaison between patient, family and health care team  - Initiate consults as appropriate (Ethics Team, Palliative Care, Family Care Conference, etc )  Outcome: Progressing     Problem: SELF HARM  Goal: Effect of psychiatric condition will be minimized and patient will be protected from self harm  Description: INTERVENTIONS:  - Assess impact of patient's symptoms on level of functioning, self-care needs and offer support as indicated  - Assess patient/family knowledge of depression, impact on illness and need for teaching  - Provide emotional support, presence and reassurance  - Assess for possible suicidal thoughts, ideation or self-harm  If patient expresses suicidal thoughts or statements do not leave alone, notify physician/AP immediately, initiate suicide precautions, and determine need for continual observation    - initiate consults and referrals as appropriate (a mental health professional, Spiritual Care  Outcome: Progressing     Problem: SUBSTANCE USE/ABUSE  Goal: Will have no detox symptoms and will verbalize plan for changing substance-related behavior  Description: INTERVENTIONS:  - Monitor physical status and assess for symptoms of withdrawal  - Administer medication as ordered  - Provide emotional support with 1 on 1 interaction with staff  - Encourage recovery focused program/ addiction education  - Assess for verbalization of changing behaviors related to substance abuse  - Initiate consults and referrals as appropriate (Case Management, Spiritual Care, etc )  Outcome: Progressing  Goal: By discharge, will develop insight into their chemical dependency and sustain motivation to continue in recovery  Description: INTERVENTIONS:  - Attends all daily group sessions and scheduled AA groups  - Actively practices coping skills through participation in the therapeutic community and adherence to program rules  - Reviews and completes assignments from individual treatment plan  - Assist patient development of understanding of their personal cycle of addiction and relapse triggers  Outcome: Progressing  Goal: By discharge, patient will have ongoing treatment plan addressing chemical dependency  Description: INTERVENTIONS:  - Assist patient with resources and/or appointments for ongoing recovery based living  Outcome: Progressing

## 2023-01-30 NOTE — ASSESSMENT & PLAN NOTE
· History of paroxysmal A  fib, not on anticoagulation due to low Kimberly Vascor  · Continue metoprolol 12 5 twice daily

## 2023-01-30 NOTE — ASSESSMENT & PLAN NOTE
Lab Results   Component Value Date    HGBA1C 10 2 (H) 01/30/2023   · Patient presented with hypoglycemia  He reports checking blood sugar every 2-3 days   He reports taking insulin 26 units every day  · A1c 10 2   · Continue on D5, sliding scale, Accu-Cheks and monitor for hyperglycemia

## 2023-01-30 NOTE — CASE MANAGEMENT
Case Management Discharge Planning Note    Patient name [facility]  Location [place] [address] /E2 [address] [facility] MRN [id number]  : 1970 Date 2023       Current Admission Date: 2023  Current Admission Diagnosis:Hypoglycemia   Patient Active Problem List    Diagnosis Date Noted   • Alcohol withdrawal ([facility] ) 2023   • Diarrhea 2023   • Seizure ([place] [address] )    • Illicit drug use 15/84/2972   • Leukocytosis 2022   • Hypothermia 2022   • Cocaine use 2022   • Abnormal chest x-ray 2022   • Bilateral hearing loss 2020   • Hypoglycemia 2020   • Hypokalemia 2020   • CKD (chronic kidney disease), stage III ([facility]  ) 2019   • Anemia 2019   • History of atrial fibrillation 2019   • Acute encephalopathy 2019   • Alcohol intoxication in active alcoholic with complication ([place] [address] )    • Essential hypertension    • Type 2 diabetes mellitus with stage 3 chronic kidney disease, with long-term current use of insulin (HCC)    • Uncomplicated alcohol dependence ([facility] )    • Paroxysmal A-fib ([facility] )    • Chronic pancreatitis ([facility] )    • Thrombocytopenia ([facility] )       LOS (days): 0  Geometric Mean LOS (GMLOS) (days):   Days to GMLOS:     OBJECTIVE:            Current admission status: Observation   Preferred Pharmacy:   [address] [place] [address]  [address] [id number]  Phone: [phone] Fax: [phone]    Primary Care Provider: No primary care provider on file      Primary Insurance: [place] Snider  Secondary Insurance:     DISCHARGE DETAILS:    Discharge planning discussed with[de-identified] Patient  Freedom of Choice: Yes  Comments - Freedom of Choice: Pt agreeable with HOST referral being made  CM contacted family/caregiver?: Yes (VM left on father's cellphone)  Were Treatment Team discharge recommendations reviewed with patient/caregiver?: Yes  Did patient/caregiver verbalize understanding of patient care needs?: Yes  Were patient/caregiver advised of the risks associated with not following Treatment Team discharge recommendations?: Yes    Contacts  Patient Contacts: Katerina Darren Araujo  Relationship to Patient[de-identified] Family  Contact Method: Phone (VM left)  Phone Number: [phone]  Reason/Outcome: Discharge [address]         Is the patient interested in [facility] Erwin at discharge?: No    DME Referral Provided  Referral made for DME?: No    Other Referral/Resources/Interventions Provided:  Interventions: Substance Abuse Treatment  Referral Comments: HOST referral placed                                                      Additional Comments: CM met with pt in the room, along with TOI, utilizing Scottish interpretor  CM discussed HOST referral with pt as he does have reported alcohol use  Pt was agreeable with HOST referral being placed  CM contacted HOST and left a detailed message with pt's room number and cellphone number along with substance use of choice  Awaiting return call from HOST   left for pt's father utilizing [place] interpretor  Call was tried twice with no answer  Return number left

## 2023-01-31 ENCOUNTER — APPOINTMENT (INPATIENT)
Dept: RADIOLOGY | Facility: HOSPITAL | Age: 53
End: 2023-01-31

## 2023-01-31 PROBLEM — G89.29 CHRONIC LOW BACK PAIN: Status: ACTIVE | Noted: 2023-01-31

## 2023-01-31 PROBLEM — M54.50 CHRONIC LOW BACK PAIN: Status: ACTIVE | Noted: 2023-01-31

## 2023-01-31 LAB
ALBUMIN SERPL BCP-MCNC: 3.3 G/DL (ref 3.5–5)
ALP SERPL-CCNC: 55 U/L (ref 34–104)
ALT SERPL W P-5'-P-CCNC: 22 U/L (ref 7–52)
ANION GAP SERPL CALCULATED.3IONS-SCNC: 7 MMOL/L (ref 4–13)
AST SERPL W P-5'-P-CCNC: 21 U/L (ref 13–39)
BASOPHILS # BLD AUTO: 0.03 THOUSANDS/ÂΜL (ref 0–0.1)
BASOPHILS NFR BLD AUTO: 0 % (ref 0–1)
BILIRUB SERPL-MCNC: 2.46 MG/DL (ref 0.2–1)
BUN SERPL-MCNC: 18 MG/DL (ref 5–25)
CALCIUM ALBUM COR SERPL-MCNC: 8.9 MG/DL (ref 8.3–10.1)
CALCIUM SERPL-MCNC: 8.3 MG/DL (ref 8.4–10.2)
CHLORIDE SERPL-SCNC: 100 MMOL/L (ref 96–108)
CO2 SERPL-SCNC: 24 MMOL/L (ref 21–32)
CREAT SERPL-MCNC: 1.25 MG/DL (ref 0.6–1.3)
EOSINOPHIL # BLD AUTO: 0.13 THOUSAND/ÂΜL (ref 0–0.61)
EOSINOPHIL NFR BLD AUTO: 2 % (ref 0–6)
ERYTHROCYTE [DISTWIDTH] IN BLOOD BY AUTOMATED COUNT: 11.7 % (ref 11.6–15.1)
GFR SERPL CREATININE-BSD FRML MDRD: 65 ML/MIN/1.73SQ M
GLUCOSE SERPL-MCNC: 202 MG/DL (ref 65–140)
GLUCOSE SERPL-MCNC: 284 MG/DL (ref 65–140)
GLUCOSE SERPL-MCNC: 316 MG/DL (ref 65–140)
GLUCOSE SERPL-MCNC: 403 MG/DL (ref 65–140)
HCT VFR BLD AUTO: 33.2 % (ref 36.5–49.3)
HGB BLD-MCNC: 12.1 G/DL (ref 12–17)
IMM GRANULOCYTES # BLD AUTO: 0.05 THOUSAND/UL (ref 0–0.2)
IMM GRANULOCYTES NFR BLD AUTO: 1 % (ref 0–2)
LIPASE SERPL-CCNC: <6 U/L (ref 11–82)
LYMPHOCYTES # BLD AUTO: 2.07 THOUSANDS/ÂΜL (ref 0.6–4.47)
LYMPHOCYTES NFR BLD AUTO: 26 % (ref 14–44)
MAGNESIUM SERPL-MCNC: 1.6 MG/DL (ref 1.9–2.7)
MCH RBC QN AUTO: 33.2 PG (ref 26.8–34.3)
MCHC RBC AUTO-ENTMCNC: 36.4 G/DL (ref 31.4–37.4)
MCV RBC AUTO: 91 FL (ref 82–98)
MONOCYTES # BLD AUTO: 0.77 THOUSAND/ÂΜL (ref 0.17–1.22)
MONOCYTES NFR BLD AUTO: 10 % (ref 4–12)
NEUTROPHILS # BLD AUTO: 4.89 THOUSANDS/ÂΜL (ref 1.85–7.62)
NEUTS SEG NFR BLD AUTO: 61 % (ref 43–75)
NRBC BLD AUTO-RTO: 0 /100 WBCS
PLATELET # BLD AUTO: 166 THOUSANDS/UL (ref 149–390)
PMV BLD AUTO: 10.8 FL (ref 8.9–12.7)
POTASSIUM SERPL-SCNC: 3.8 MMOL/L (ref 3.5–5.3)
PROT SERPL-MCNC: 5.8 G/DL (ref 6.4–8.4)
RBC # BLD AUTO: 3.65 MILLION/UL (ref 3.88–5.62)
SODIUM SERPL-SCNC: 131 MMOL/L (ref 135–147)
WBC # BLD AUTO: 7.94 THOUSAND/UL (ref 4.31–10.16)

## 2023-01-31 RX ORDER — ACETAMINOPHEN 325 MG/1
650 TABLET ORAL EVERY 6 HOURS SCHEDULED
Status: DISCONTINUED | OUTPATIENT
Start: 2023-01-31 | End: 2023-02-03 | Stop reason: HOSPADM

## 2023-01-31 RX ORDER — LIDOCAINE 50 MG/G
1 PATCH TOPICAL DAILY
Status: DISCONTINUED | OUTPATIENT
Start: 2023-01-31 | End: 2023-02-03 | Stop reason: HOSPADM

## 2023-01-31 RX ORDER — INSULIN GLARGINE 100 [IU]/ML
10 INJECTION, SOLUTION SUBCUTANEOUS
Status: DISCONTINUED | OUTPATIENT
Start: 2023-01-31 | End: 2023-02-01

## 2023-01-31 RX ORDER — INSULIN LISPRO 100 [IU]/ML
3 INJECTION, SOLUTION INTRAVENOUS; SUBCUTANEOUS
Status: DISCONTINUED | OUTPATIENT
Start: 2023-02-01 | End: 2023-02-01

## 2023-01-31 RX ORDER — INSULIN GLARGINE 100 [IU]/ML
5 INJECTION, SOLUTION SUBCUTANEOUS
Status: DISCONTINUED | OUTPATIENT
Start: 2023-01-31 | End: 2023-01-31

## 2023-01-31 RX ORDER — SODIUM CHLORIDE 9 MG/ML
75 INJECTION, SOLUTION INTRAVENOUS CONTINUOUS
Status: DISPENSED | OUTPATIENT
Start: 2023-01-31 | End: 2023-01-31

## 2023-01-31 RX ORDER — MAGNESIUM SULFATE HEPTAHYDRATE 40 MG/ML
2 INJECTION, SOLUTION INTRAVENOUS ONCE
Status: COMPLETED | OUTPATIENT
Start: 2023-01-31 | End: 2023-02-01

## 2023-01-31 RX ORDER — METHOCARBAMOL 500 MG/1
500 TABLET, FILM COATED ORAL EVERY 6 HOURS PRN
Status: DISCONTINUED | OUTPATIENT
Start: 2023-01-31 | End: 2023-02-03 | Stop reason: HOSPADM

## 2023-01-31 RX ORDER — TRAMADOL HYDROCHLORIDE 50 MG/1
50 TABLET ORAL EVERY 6 HOURS PRN
Status: DISCONTINUED | OUTPATIENT
Start: 2023-01-31 | End: 2023-02-03 | Stop reason: HOSPADM

## 2023-01-31 RX ADMIN — THIAMINE HCL TAB 100 MG 100 MG: 100 TAB at 08:21

## 2023-01-31 RX ADMIN — MAGNESIUM SULFATE HEPTAHYDRATE 2 G: 40 INJECTION, SOLUTION INTRAVENOUS at 07:44

## 2023-01-31 RX ADMIN — Medication 12.5 MG: at 08:21

## 2023-01-31 RX ADMIN — LIDOCAINE 1 PATCH: 50 PATCH TOPICAL at 11:11

## 2023-01-31 RX ADMIN — MULTIPLE VITAMINS W/ MINERALS TAB 1 TABLET: TAB ORAL at 08:21

## 2023-01-31 RX ADMIN — FOLIC ACID 1 MG: 1 TABLET ORAL at 08:21

## 2023-01-31 RX ADMIN — TRAMADOL HYDROCHLORIDE 50 MG: 50 TABLET, COATED ORAL at 09:10

## 2023-01-31 RX ADMIN — CHLORDIAZEPOXIDE HYDROCHLORIDE 25 MG: 25 CAPSULE ORAL at 23:16

## 2023-01-31 RX ADMIN — INSULIN GLARGINE 10 UNITS: 100 INJECTION, SOLUTION SUBCUTANEOUS at 23:16

## 2023-01-31 RX ADMIN — INSULIN LISPRO 1 UNITS: 100 INJECTION, SOLUTION INTRAVENOUS; SUBCUTANEOUS at 15:38

## 2023-01-31 RX ADMIN — TRAMADOL HYDROCHLORIDE 50 MG: 50 TABLET, COATED ORAL at 04:29

## 2023-01-31 RX ADMIN — CHLORDIAZEPOXIDE HYDROCHLORIDE 25 MG: 25 CAPSULE ORAL at 14:18

## 2023-01-31 RX ADMIN — INSULIN LISPRO 3 UNITS: 100 INJECTION, SOLUTION INTRAVENOUS; SUBCUTANEOUS at 09:50

## 2023-01-31 RX ADMIN — ACETAMINOPHEN 325MG 650 MG: 325 TABLET ORAL at 23:16

## 2023-01-31 RX ADMIN — SODIUM CHLORIDE 75 ML/HR: 0.9 INJECTION, SOLUTION INTRAVENOUS at 11:06

## 2023-01-31 RX ADMIN — ENOXAPARIN SODIUM 40 MG: 40 INJECTION SUBCUTANEOUS at 08:21

## 2023-01-31 RX ADMIN — ACETAMINOPHEN 325MG 650 MG: 325 TABLET ORAL at 17:25

## 2023-01-31 RX ADMIN — LISINOPRIL 40 MG: 20 TABLET ORAL at 08:21

## 2023-01-31 RX ADMIN — ACETAMINOPHEN 650 MG: 325 TABLET ORAL at 00:05

## 2023-01-31 RX ADMIN — CHLORDIAZEPOXIDE HYDROCHLORIDE 25 MG: 25 CAPSULE ORAL at 05:02

## 2023-01-31 RX ADMIN — INSULIN LISPRO 2 UNITS: 100 INJECTION, SOLUTION INTRAVENOUS; SUBCUTANEOUS at 11:09

## 2023-01-31 RX ADMIN — HYDROCHLOROTHIAZIDE 25 MG: 25 TABLET ORAL at 08:21

## 2023-01-31 RX ADMIN — ACETAMINOPHEN 325MG 650 MG: 325 TABLET ORAL at 12:13

## 2023-01-31 NOTE — ASSESSMENT & PLAN NOTE
Lab Results   Component Value Date    HGBA1C 10 2 (H) 01/30/2023     · Patient presented with hypoglycemia  Per chart review he reports checking blood sugar every 2-3 days as well as insulin 26 units every day    · Per chart review, office visit in 8/2020 displays 26 units Lantus  · Father reports bedside today that patient does not have insulin at home and will need it  · A1c 10 2   · D5W D/C'd, sugars elevated today around 300-400  · Will add on 5 units qpm and watch sugars  · Continue SSI, Accu-Cheks, monitor for hyperglycemia and hypoglycemic protocol  · Endocrinology consulted and input appreciated for insulin re-initiation and management  · Level 2 Carbohydrate diet

## 2023-01-31 NOTE — ASSESSMENT & PLAN NOTE
Lab Results   Component Value Date    HGBA1C 10 2 (H) 01/30/2023     · Patient presented with hypoglycemia  Per chart review he reports checking blood sugar every 2-3 days as well as insulin 26 units every day    · Per chart review, office visit in 8/2020 displays 26 units Lantus  · Will require insulin refill at discharge  · A1c 10 2   · Increase Lantus to 15 units and Humalog 5 units TID  · Continue SSI, Accu-Cheks, monitor for hyperglycemia and hypoglycemic protocol  · Endocrinology consulted and input appreciated for insulin re-initiation and management  · Level 2 Carbohydrate diet

## 2023-01-31 NOTE — ASSESSMENT & PLAN NOTE
· With history, pt asking to see someone for it  · Per chart review ENT apt was set up and labeled as a no-show  · Father bedside aiding in translation due to [facility]  · Will need to re-attempt outpatient follow up with ENT

## 2023-01-31 NOTE — ASSESSMENT & PLAN NOTE
· Pt with chronic low back pain for approximately 4 years (per father) with difficulty ambulating around the house at times  · Complains of lower back pain with sharp pains that radiate down his back legs  · Pt denies IV drug use, recent falls/back trauma, neck pain/stiffness, or weakness in legs  · W/o red flag symptoms on exam, output adequate per nursing staff  · Likely neuropathy secondary to diabetes, however will obtain x-ray lumbar spine to R/O acute findings due to spinal tenderness on exam  · Lidocaine patch daily, Scheduled tylenol  · Robaxin and pain management prn  · PT/OT consulted and input appreciated

## 2023-01-31 NOTE — CONSULTS
Consultation - Monet Beltran 46 y o  male MRN: [id number]    Unit/Bed#: E2 -01 Encounter: [id number]      Assessment/Plan   55-year-old male with history of alcohol abuse, chronic pancreatitis, type 2 diabetes on insulin therapy who has had multiple episodes of both hypo and hyperglycemia since admission   -Diabetes is poorly controlled as evidenced by hemoglobin A1c of 10 2  Given the degree of hyperglycemia as well as history of chronic pancreatitis insulin therapy is the optimal choice however given concern for ongoing alcohol use he would be at risk for severe hypoglycemia as well  He will likely need supervision as far as fingerstick monitoring and insulin administration his concern  Discussed with the nurse -it appears that the patient lives with his parents and father is involved in his medical care  For now I would suggest starting Lantus 10 units daily ,start Humalog 2 units before meals  Monitor blood sugar before meals and bedtime and adjust    CC: Diabetes Consult    History of Present Illness     HPI: Monet Beltran is a 46y o  year old male with type 2 diabetes on insulin therapy, alcohol abuse-who initially presented to the hospital with increased lethargy and concern for withdrawal-on admission he was found to be hypoglycemic -after admission he has had multiple episodes of hypoglycemia -endocrine consultation is requested for management of uncontrolled diabetes  Patient is Tajik-speaking so history is obtained with the help of PCA by bedside-he is a poor historian and unable to provide detailed history  Does state that he has had these diagnosed therapy  He states that he takes 26 units of his insulin a few times per week  He states that he does not check his blood sugars  It is unclear as to when he really last took insulin    After admission to the hospital insulin was held-he has not had any episodes of hypoglycemia in the past 48 hours-sugars in the past 24 hours have ranged between 200s to 400s and he is currently on correctional insulin only    He has good appetite, denies any nausea or vomiting      Consults    Review of Systems    Historical Information   Past Medical History:   Diagnosis Date   • Alcohol abuse    • Chronic pancreatitis ([facility] )    • Diabetes mellitus ([facility] )     Type 2   • Pancreatitis    • Paroxysmal A-fib ([facility] )    • Thrombocytopenia ([facility] )      Past Surgical History:   Procedure Laterality Date   • INCISION AND DRAINAGE OF WOUND N/A 8/16/2020    Procedure: INCISION AND DRAINAGE (I&D) HEAD/FACE;  Surgeon: Ralph Jimenez DMD;  Location: [facility];  Service: Maxillofacial   • IR BIOPSY BONE MARROW  2/7/2019   • REMOVAL OF IMPACTED TOOTH - COMPLETELY BONY N/A 8/16/2020    Procedure: EXTRACTION TEETH MULTIPLE #2, 3;  Surgeon: Ralph Jimenez DMD;  Location: [facility];  Service: Maxillofacial     Social History   Social History     Substance and Sexual Activity   Alcohol Use Yes     Social History     Substance and Sexual Activity   Drug Use Yes   • Types: Cocaine     Social History     Tobacco Use   Smoking Status Former   Smokeless Tobacco Never     Family History:   Family History   Problem Relation Age of Onset   • Diabetes Mother    • Hypertension Mother    • Hyperlipidemia Father        Meds/Allergies   Current Facility-Administered Medications   Medication Dose Route Frequency Provider Last Rate Last Admin   • acetaminophen (TYLENOL) tablet 650 mg  650 mg Oral Q6H [address] Alex Chinchilla PA-C   650 mg at 01/31/23 1213   • chlordiazePOXIDE (LIBRIUM) capsule 25 mg  25 mg Oral Q8H [address] Phong Gupta MD   25 mg at 01/31/23 1418   • enoxaparin (LOVENOX) subcutaneous injection 40 mg  40 mg Subcutaneous Daily Radha Edwards MD   40 mg at 54/09/02 6612   • folic acid (FOLVITE) tablet 1 mg  1 mg Oral Daily Radha Edwards MD   1 mg at 01/31/23 8283   • hydrALAZINE (APRESOLINE) injection 5 mg  5 mg Intravenous Q6H PRN Radha Edwards MD       • lisinopril (ZESTRIL) tablet 40 mg  40 mg Oral Daily Colin Taylor MD   40 mg at 01/31/23 4147    And   • hydrochlorothiazide (HYDRODIURIL) tablet 25 mg  25 mg Oral Daily Colin Taylor MD   25 mg at 01/31/23 5940   • insulin glargine (LANTUS) subcutaneous injection 5 Units 0 05 mL  5 Units Subcutaneous HS Alex Chinchilla PA-C       • insulin lispro (HumaLOG) 100 units/mL subcutaneous injection 1-5 Units  1-5 Units Subcutaneous TID AC Colin Taylor MD   1 Units at 01/31/23 1538   • lidocaine (LIDODERM) 5 % patch 1 patch  1 patch Topical Daily Nithya Payne PA-C   1 patch at 01/31/23 1111   • methocarbamol (ROBAXIN) tablet 500 mg  500 mg Oral Q6H PRN Nithya Payne PA-C       • metoprolol tartrate (LOPRESSOR) partial tablet 12 5 mg  12 5 mg Oral BID Colin Taylor MD   12 5 mg at 01/31/23 0443   • multivitamin-minerals (CENTRUM) tablet 1 tablet  1 tablet Oral Daily Colin Taylor MD   1 tablet at 01/31/23 2360   • ondansetron (ZOFRAN) injection 4 mg  4 mg Intravenous Q6H PRN Colin Taylor MD       • sodium chloride 0 9 % infusion  75 mL/hr Intravenous Continuous Alex Chinchilla PA-C 75 mL/hr at 01/31/23 1106 75 mL/hr at 01/31/23 1106   • thiamine tablet 100 mg  100 mg Oral Daily Colin Taylor MD   100 mg at 01/31/23 6969   • traMADol (ULTRAM) tablet 50 mg  50 mg Oral Q6H PRN Kacey Manzo PA-C   50 mg at 01/31/23 0910     No Known Allergies    Objective   Vitals: Blood pressure 99/60, pulse 68, temperature (!) 97 °F (36 1 °C), temperature source Temporal, resp  rate 18, height 5' 9" (1 753 m), weight 68 7 kg (151 lb 7 3 oz), SpO2 97 %  Intake/Output Summary (Last 24 hours) at 1/31/2023 1655  Last data filed at 1/31/2023 0800  Gross per 24 hour   Intake 120 ml   Output --   Net 120 ml     Invasive Devices     Peripheral Intravenous Line  Duration           Peripheral IV 01/29/23 Right Antecubital 2 days                Physical Exam  Vitals reviewed  Constitutional:       General: He is not in acute distress       Appearance: He is not ill-appearing, toxic-appearing or diaphoretic  HENT:      Head: Normocephalic and atraumatic  Eyes:      General: No scleral icterus  Extraocular Movements: Extraocular movements intact  Pulmonary:      Effort: Pulmonary effort is normal  No respiratory distress  Musculoskeletal:      Cervical back: Neck supple  Right lower leg: No edema  Left lower leg: No edema  Skin:     General: Skin is warm and dry  Neurological:      General: No focal deficit present  Mental Status: He is alert  The history was obtained from the review of the chart, patient  Lab Results:   Results from last 7 days   Lab Units 01/30/23  0430   HEMOGLOBIN A1C % 10 2*     Lab Results   Component Value Date    WBC 7 94 01/31/2023    HGB 12 1 01/31/2023    HCT 33 2 (L) 01/31/2023    MCV 91 01/31/2023     01/31/2023     Lab Results   Component Value Date/Time    BUN 18 01/31/2023 01:14 AM    BUN 9 04/11/2018 10:15 AM     04/11/2018 10:15 AM    K 3 8 01/31/2023 01:14 AM    K 4 6 04/11/2018 10:15 AM     01/31/2023 01:14 AM     04/11/2018 10:15 AM    CO2 24 01/31/2023 01:14 AM    CO2 28 04/11/2018 10:15 AM    CREATININE 1 25 01/31/2023 01:14 AM    CREATININE 0 86 09/14/2015 06:22 PM    AST 21 01/31/2023 01:14 AM    AST 28 04/11/2018 10:15 AM    ALT 22 01/31/2023 01:14 AM    ALT 40 04/11/2018 10:15 AM    ALB 3 3 (L) 01/31/2023 01:14 AM    ALB 4 5 04/11/2018 10:15 AM     No results for input(s): CHOL, HDL, LDL, TRIG, VLDL in the last 72 hours  No results found for: Vladislav Escobedo  POC Glucose   Date Value   01/31/2023 202 mg/dl (H)   01/31/2023 284 mg/dl (H)   01/31/2023 316 mg/dl (H)   01/30/2023 274 mg/dl (H)   01/30/2023 286 mg/dl (H)   01/30/2023 198 mg/dl (H)   01/30/2023 185 mg/dl (H)   01/30/2023 111 mg/dl   01/30/2023 133 mg/dl   01/30/2023 84 mg/dl   12/25/2014 124 mg/dL       Imaging Studies: I have personally reviewed pertinent reports      Study Result    Wet Read FINDINGS:       ABDOMEN       LOWER CHEST:  No clinically significant abnormality identified in the visualized lower chest        LIVER/BILIARY TREE:  Apparent hepatic hypoenhancement relative to expectation may suggest steatosis, though specificity of CT is decreased after contrast administration  No masses or biliary ductal dilatation  Overall liver size and contour are normal        GALLBLADDER:  No calcified gallstones  No pericholecystic inflammatory change        SPLEEN:  Unremarkable        PANCREAS:  Severe diffuse atrophy of the pancreas with parenchymal calcifications  Ex vacuo prominence of the main duct  Findings consistent with chronic pancreatitis and unchanged from prior  No suspicious masses        ADRENAL GLANDS:  Unremarkable        KIDNEYS/URETERS:  Unremarkable  No hydronephrosis        STOMACH AND BOWEL:  Large colonic stool burden extending to the rectum  No dilated or inflamed loops of small or large bowel  Stomach is normal for level distention        APPENDIX:  Not well demonstra   benjie   No evidence for acute appendicitis        ABDOMINOPELVIC CAVITY:  No ascites   No pneumoperitoneum   No lymphadenopathy        VESSELS:  Unremarkable for patient's age        PELVIS       REPRODUCTIVE ORGANS:  Unremarkable for patient's age        URINARY BLADDER:  Unremarkable        ABDOMINAL WALL/INGUINAL REGIONS:  Unremarkable        OSSEOUS STRUCTURES:  No acute fracture or destructive osseous lesion        IMPRESSION:       1   Large stool burden without obstruction or other acute abdominopelvic findings        2   Unchanged sequela of chronic pancreatitis  Interpreted by Katrina Warren DO on 1/30/2022  2:14 PM   Narrative & Impression   CT ABDOMEN AND PELVIS WITH IV CONTRAST     INDICATION:   Flank and back pain  History of chronic pancreatitis  Portions of the record may have been created with voice recognition software

## 2023-01-31 NOTE — ASSESSMENT & PLAN NOTE
· Patient with chronic history of alcohol dependency and abuse, last drink was reportedly PTA  · Alcohol level on admission negative  · Continue thiamine, multivitamins and folate  · CIWA protocol  · Discussed with nursing, patient has been doing well  · Continue librium 25 mg q 8 hrs   · Discussed with father bedside who reports patient will drink a handle of liquor sometimes daily for a few weeks in a row  Pt reports he has quit in the past and can do it again  Reviewed PCP notes from last visit in 2020 where similar conversation occurred  Offered services and patient declined bedside   Discussed with ZOILA

## 2023-01-31 NOTE — ASSESSMENT & PLAN NOTE
· Pt with chronic low back pain for approximately 4 years (per father) with difficulty ambulating around the house at times  · Complains of lower back pain with sharp pains that radiate down his back legs  · Pt denies IV drug use, recent falls/back trauma, neck pain/stiffness, or weakness in legs  · W/o red flag symptoms on exam, output adequate per nursing staff  · Xray lumbar spine unremarkable  · Likely neuropathy secondary to diabetes  · Lidocaine patch daily, Scheduled tylenol  · Robaxin and pain management prn  · PT/OT recommending home with home health

## 2023-01-31 NOTE — ASSESSMENT & PLAN NOTE
· With history, CT abd/pelvis 2/2019 with "chronic calcifications likely related to pancreatitis in addition to pancreatic atrophy  Suspect a dilated pancreatic duct which is unchanged from the prior exam likely from chronic pancreatitis "  · Without fever, leukocytosis, abdominal pain, N/V, decreased appetite  · Lipase WNL however given chronicity may not be acutely elevated   Total bilirubin 1 09  Today decreased from 2 46 on IVF  · Repeat CMP in AM, serial abdominal exams  · Diet as tolerated

## 2023-01-31 NOTE — PROGRESS NOTES
[facility]  Progress Note - [address] 1970, 46 y o  male MRN: [id number]  Unit/Bed#: E2 -01 Encounter: [id number]  Primary Care Provider: No primary care provider on file  Date and time admitted to hospital: 1/29/2023  6:55 AM    * Hypoglycemia  Assessment & Plan  · 49-year-old male with past medical history of alcohol abuse, type 2 diabetes, paroxysmal A  fib presented with increased lethargy, shakiness concerning for withdrawals, found to be hypoglycemic with blood sugars in the 60s  · Patient has frequent hospitalizations for hypoglycemia and intoxication  He does not follow with a PCP education compliance is unclear  He is insistent that he requires insulin  · D5 fluids stopped yesterday  · Monitor blood work for electrolyte abnormalities  · Accu-Checks, see plan below under diabetes  · Appreciate further endocrinology input    Alcohol withdrawal ([facility] [address] )  Assessment & Plan  · Patient with chronic history of alcohol dependency and abuse, last drink was reportedly PTA  · Alcohol level on admission negative  · Continue thiamine, multivitamins and folate  · CIWA protocol  · Discussed with nursing, patient has been doing well  · Continue librium 25 mg q 8 hrs   · Discussed with father bedside who reports patient will drink a handle of liquor sometimes daily for a few weeks in a row  Pt reports he has quit in the past and can do it again  Reviewed PCP notes from last visit in 2020 where similar conversation occurred  Offered services and patient declined bedside  Discussed with CM    Type 2 diabetes mellitus with stage 3 chronic kidney disease, with long-term current use of insulin [facility])  Assessment & Plan  Lab Results   Component Value Date    HGBA1C 10 2 (H) 01/30/2023     · Patient presented with hypoglycemia  Per chart review he reports checking blood sugar every 2-3 days as well as insulin 26 units every day    · Per chart review, office visit in 8/2020 displays 26 units Lantus  · Father reports bedside today that patient does not have insulin at home and will need it  · A1c 10 2   · D5W D/C'd, sugars elevated today around 300-400  · Will add on 5 units Lantus qpm  · Continue SSI, Accu-Cheks, monitor for hyperglycemia and hypoglycemic protocol  · Endocrinology consulted and input appreciated for insulin re-initiation and management  · Level 2 Carbohydrate diet    Hypokalemia  Assessment & Plan  · On arrival, suspect likely in the setting of alcohol abuse  · Now with sodium 131, will give IV 75 mL/hr NaCl for 12 hours  · Mg 1 6, repleted  · Continue to monitor electrolytes    Chronic low back pain  Assessment & Plan  · Pt with chronic low back pain for approximately 4 years (per father) with difficulty ambulating around the house at times  · Complains of lower back pain with sharp pains that radiate down his back legs  · Pt denies IV drug use, recent falls/back trauma, neck pain/stiffness, or weakness in legs  · W/o red flag symptoms on exam, output adequate per nursing staff  · Likely neuropathy secondary to diabetes, however will obtain x-ray lumbar spine to R/O acute findings due to spinal tenderness on exam  · Lidocaine patch daily, Scheduled tylenol  · Robaxin and pain management prn  · PT/OT consulted and input appreciated    Essential hypertension  Assessment & Plan  · Blood pressure initially hypertensive, improved to -150  · Continue metoprolol, lisinopril, HCTZ    History of atrial fibrillation  Assessment & Plan  · History of paroxysmal A  fib, not on anticoagulation due to low Kimberly Vascor  · Continue metoprolol 12 5 twice daily    Chronic pancreatitis (HCC)  Assessment & Plan  · With history, CT abd/pelvis 2/2019 with "chronic calcifications likely related to pancreatitis in addition to pancreatic atrophy   Suspect a dilated pancreatic duct which is unchanged from the prior exam likely from chronic pancreatitis "  · Without fever, leukocytosis, abdominal pain, N/V, decreased appetite  · Lipase WNL however given chronicity may not be acutely elevated  Total bilirubin elevated today at 2 46 from 1 05 on admission  May be component in back pain  · Giving IV fluids for 12 hours  · Repeat CMP in AM, serial abdominal exams  · Diet as tolerated    Bilateral hearing loss  Assessment & Plan  · With history, pt asking to see someone for it  · Per chart review ENT apt was set up and labeled as a no-show  · Father bedside aiding in translation due to [facility]  · Will need to re-attempt outpatient follow up with ENT    VTE Pharmacologic Prophylaxis:   Moderate Risk (Score 3-4) - Pharmacological DVT Prophylaxis Ordered: enoxaparin (Lovenox)  Patient Centered Rounds: I performed bedside rounds with nursing staff today  Discussions with Specialists or Other Care Team Provider: Endocrinology    Education and Discussions with Family / Patient: Updated  (father) at bedside  Time Spent for Care: 90 minutes  More than 50% of total time spent on counseling and coordination of care as described above  Current Length of Stay: 0 day(s)  Current Patient Status: Inpatient   Certification Statement: The patient will continue to require additional inpatient hospital stay due to Diabetic management, back pain work up, PT/OT  Discharge Plan: Anticipate discharge in 24-48 hrs to pending PT/OT eval, endocrinology suggestions, CM consulted and help appreciated    Code Status: Level 1 - Full Code    Subjective:    was used during the visit and patient's father helped due to patient's hearing  Patient reports severe lower back pain which has been ongoing for 4 years  Shares the back pain starts on both sides and is a stabbing pain down the back of both legs  After further discussion patient denies seeing anyone for this such as pain management or therapy  He denies any recent fall or trauma    Does have some numbness and tingling from time to time and lower legs which is described as a burning pain  Patient does have good range of motion without weakness on bilateral legs  Has been going to the bathroom and last urinated this a m  without difficulty  Father reports that patient drinks a handle of alcohol most of the time daily for weeks on end  Patient lives at home with his parents as they are his sole supporters for locations and care  Patient shares that he gets most of his treatment through quick ER visits  After questioning patient if he would want alcoholic rehab he shares that he does not need rehab and he has quit before on his own  He further denies any fever, chills, headache, chest pain, heart palpitations, blurred vision, or shortness of breath  He is without tremors or sweats  Reports he is hungry and would like help for his back pain and difficulty hearing  Objective:     Vitals:   Temp (24hrs), Av 2 °F (36 2 °C), Min:97 °F (36 1 °C), Max:97 7 °F (36 5 °C)    Temp:  [97 °F (36 1 °C)-97 7 °F (36 5 °C)] 97 °F (36 1 °C)  HR:  [60-81] 68  Resp:  [16-20] 18  BP: ()/(60-85) 99/60  SpO2:  [97 %-100 %] 97 %  Body mass index is 22 37 kg/m²  Input and Output Summary (last 24 hours): Intake/Output Summary (Last 24 hours) at 2023 1512  Last data filed at 2023 0800  Gross per 24 hour   Intake 120 ml   Output --   Net 120 ml       Physical Exam:   Physical Exam  Vitals and nursing note reviewed  Constitutional:       General: He is not in acute distress  Appearance: Normal appearance  He is well-developed  He is not ill-appearing  Comments: Patient lying in bed, [place]   HENT:      Head: Normocephalic and atraumatic  Right Ear: External ear normal       Left Ear: External ear normal    Eyes:      Extraocular Movements: Extraocular movements intact  Conjunctiva/sclera: Conjunctivae normal       Pupils: Pupils are equal, round, and reactive to light     Cardiovascular:      Rate and Rhythm: Normal rate and regular rhythm  Pulses:           Radial pulses are 2+ on the right side and 2+ on the left side  Dorsalis pedis pulses are 2+ on the right side and 2+ on the left side  Heart sounds: Normal heart sounds  No murmur heard  Pulmonary:      Effort: Pulmonary effort is normal  No respiratory distress  Breath sounds: Normal breath sounds  No wheezing or rales  Abdominal:      General: Bowel sounds are normal  There is no distension  Palpations: Abdomen is soft  Tenderness: There is no abdominal tenderness  There is no guarding  Musculoskeletal:      Cervical back: Normal range of motion  No rigidity or tenderness  Lumbar back: Bony tenderness present  Right lower leg: No edema  Left lower leg: No edema  Lymphadenopathy:      Cervical: No cervical adenopathy  Skin:     General: Skin is warm and dry  Capillary Refill: Capillary refill takes less than 2 seconds  Neurological:      General: No focal deficit present  Mental Status: He is alert and oriented to person, place, and time  Cranial Nerves: No facial asymmetry  Motor: No weakness  Comments: Patient with normal ROM on b/l lower ankles/knees, and limited ROM to hips to extension and flexion secondary to pain  Able to wiggle toes  Neurovascularly intact     Psychiatric:         Mood and Affect: Mood normal         Additional Data:     Labs:  Results from last 7 days   Lab Units 01/31/23  0114   WBC Thousand/uL 7 94   HEMOGLOBIN g/dL 12 1   HEMATOCRIT % 33 2*   PLATELETS Thousands/uL 166   NEUTROS PCT % 61   LYMPHS PCT % 26   MONOS PCT % 10   EOS PCT % 2     Results from last 7 days   Lab Units 01/31/23  0114   SODIUM mmol/L 131*   POTASSIUM mmol/L 3 8   CHLORIDE mmol/L 100   CO2 mmol/L 24   BUN mg/dL 18   CREATININE mg/dL 1 25   ANION GAP mmol/L 7   CALCIUM mg/dL 8 3*   ALBUMIN g/dL 3 3*   TOTAL BILIRUBIN mg/dL 2 46*   ALK PHOS U/L 55   ALT U/L 22   AST U/L 21   GLUCOSE RANDOM mg/dL 403* Results from last 7 days   Lab Units 01/29/23  0727   INR  0 96     Results from last 7 days   Lab Units 01/31/23  1107 01/31/23  0632 01/30/23  2039 01/30/23  1425 01/30/23  1205 01/30/23  1038 01/30/23  0816 01/30/23  0601 01/30/23  0406 01/30/23  0159 01/30/23  0030 01/29/23  2228   POC GLUCOSE mg/dl 284* 316* 274* 286* 198* 185* 111 133 84 83 122 205*     Results from last 7 days   Lab Units 01/30/23  0430   HEMOGLOBIN A1C % 10 2*           Lines/Drains:  Invasive Devices     Peripheral Intravenous Line  Duration           Peripheral IV 01/29/23 Right Antecubital 2 days                Imaging: Pending lumbar x-ray    Recent Cultures (last 7 days):         Last 24 Hours Medication List:   Current Facility-Administered Medications   Medication Dose Route Frequency Provider Last Rate   • acetaminophen  650 mg Oral Q6H [address] Alex Chinchilla PA-C     • chlordiazePOXIDE  25 mg Oral Q8H Saroj Osborn MD     • enoxaparin  40 mg Subcutaneous Daily Chavo Mcelroy MD     • folic acid  1 mg Oral Daily Chavo Mcelroy MD     • hydrALAZINE  5 mg Intravenous Q6H PRN Chavo Mcelroy MD     • lisinopril  40 mg Oral Daily Chavo Mcelroy MD      And   • hydrochlorothiazide  25 mg Oral Daily Chavo Mcelroy MD     • insulin glargine  5 Units Subcutaneous HS Alex Chinchilla PA-C     • insulin lispro  1-5 Units Subcutaneous TID AC Phong Astudillo MD     • lidocaine  1 patch Topical Daily Morgan Gallego PA-C     • methocarbamol  500 mg Oral Q6H PRN Morgan Gallego PA-C     • metoprolol tartrate  12 5 mg Oral BID Chavo Mcelroy MD     • multivitamin-minerals  1 tablet Oral Daily Chavo Mcelroy MD     • ondansetron  4 mg Intravenous Q6H PRN Chavo Mcelroy MD     • sodium chloride  75 mL/hr Intravenous Continuous Alex Chinchilla PA-C 75 mL/hr (01/31/23 1106)   • thiamine  100 mg Oral Daily Chavo Mcelroy MD     • traMADol  50 mg Oral Q6H PRN Eder Alexander PA-C          Today, Patient Was Seen By: Morgan Gallego PA-C    **Please Note: This note may have been constructed using a voice recognition system  **

## 2023-01-31 NOTE — ASSESSMENT & PLAN NOTE
· On arrival, suspect likely in the setting of alcohol abuse  · Now with sodium 131, will give IV 75 mL/hr NaCl for 12 hours  · Mg 1 6, repleted  · Continue to monitor electrolytes

## 2023-01-31 NOTE — PROGRESS NOTES
Pt called nursing for pain medication, states he has severe low back pain that's going down his legs, his legs are going numb, as well as abd pain  Used IPAD  to attempt to get more information about pain from patient  He got agitated, stated he's not answering questions until he gets pain medication  Did manage to learn that per pt, he slipped on ice and had a back injury 5-6 years ago  Has had pain and occasional numbness of legs intermittently since then  When asked what he takes at home he stated "not much"  N/V check on legs was wnl except pt reported decreased sensation  Pt is moving legs freely in bed when not being watched  When asked to move legs, he moans/groans like it's hurting his back to move  Regarding his abd pain, he states it's generalized, but won't answer questions about it's nature  No nausea/vomiting  On call provider notified of pt's requests and assessment findings

## 2023-01-31 NOTE — ASSESSMENT & PLAN NOTE
· On arrival, suspect likely in the setting of alcohol abuse  · Mg 1 6, repleted  · Continue to monitor electrolytes

## 2023-01-31 NOTE — ASSESSMENT & PLAN NOTE
· With history, pt asking to see someone for it  · Per chart review ENT apt was set up and labeled as a no-show  · Father bedside aiding in translation due to [facility]  · Will need to re-attempt outpatient follow up with ENT

## 2023-01-31 NOTE — PLAN OF CARE
Problem: METABOLIC, FLUID AND ELECTROLYTES - ADULT  Goal: Electrolytes maintained within normal limits  Description: INTERVENTIONS:  - Monitor labs and assess patient for signs and symptoms of electrolyte imbalances  - Administer electrolyte replacement as ordered  - Monitor response to electrolyte replacements, including repeat lab results as appropriate  - Instruct patient on fluid and nutrition as appropriate  Outcome: Progressing     Problem: METABOLIC, FLUID AND ELECTROLYTES - ADULT  Goal: Fluid balance maintained  Description: INTERVENTIONS:  - Monitor labs   - Monitor I/O and WT  - Instruct patient on fluid and nutrition as appropriate  - Assess for signs & symptoms of volume excess or deficit  Outcome: Progressing     Problem: METABOLIC, FLUID AND ELECTROLYTES - ADULT  Goal: Glucose maintained within target range  Description: INTERVENTIONS:  - Monitor Blood Glucose as ordered  - Assess for signs and symptoms of hyperglycemia and hypoglycemia  - Administer ordered medications to maintain glucose within target range  - Assess nutritional intake and initiate nutrition service referral as needed  Outcome: Progressing     Problem: Nutrition/Hydration-ADULT  Goal: Nutrient/Hydration intake appropriate for improving, restoring or maintaining nutritional needs  Description: Monitor and assess patient's nutrition/hydration status for malnutrition  Collaborate with interdisciplinary team and initiate plan and interventions as ordered  Monitor patient's weight and dietary intake as ordered or per policy  Utilize nutrition screening tool and intervene as necessary  Determine patient's food preferences and provide high-protein, high-caloric foods as appropriate       INTERVENTIONS:  - Monitor oral intake, urinary output, labs, and treatment plans  - Assess nutrition and hydration status and recommend course of action  - Evaluate amount of meals eaten  - Assist patient with eating if necessary   - Allow adequate time for meals  - Recommend/ encourage appropriate diets, oral nutritional supplements, and vitamin/mineral supplements  - Order, calculate, and assess calorie counts as needed  - Recommend, monitor, and adjust tube feedings and TPN/PPN based on assessed needs  - Assess need for intravenous fluids  - Provide specific nutrition/hydration education as appropriate  - Include patient/family/caregiver in decisions related to nutrition  Outcome: Progressing     Problem: COPING  Goal: Pt/Family able to verbalize concerns and demonstrate effective coping strategies  Description: INTERVENTIONS:  - Assist patient/family to identify coping skills, available support systems and cultural and spiritual values  - Provide emotional support, including active listening and acknowledgement of concerns of patient and caregivers  - Reduce environmental stimuli, as able  - Provide patient education  - Assess for spiritual pain/suffering and initiate spiritual care, including notification of Pastoral Care or bjorn based community as needed  - Assess effectiveness of coping strategies  Outcome: Progressing     Problem: COPING  Goal: Will report anxiety at manageable levels  Description: INTERVENTIONS:  - Administer medication as ordered  - Teach and encourage coping skills  - Provide emotional support  - Assess patient/family for anxiety and ability to cope  Outcome: Progressing

## 2023-01-31 NOTE — ASSESSMENT & PLAN NOTE
· Patient with chronic history of alcohol dependency and abuse, last drink was reportedly PTA  · Alcohol level on admission negative  · Continue thiamine, multivitamins and folate  · CIWA protocol  · decrease librium 25 mg q 12 hrs   · Discussed with father bedside who reports patient will drink a handle of liquor sometimes daily for a few weeks in a row  Pt reports he has quit in the past and can do it again says he will likely stop  Reviewed PCP notes from last visit in 2020 where similar conversation occurred  Offered services and patient declined bedside   Discussed with ZOILA

## 2023-01-31 NOTE — ASSESSMENT & PLAN NOTE
· 58-year-old male with past medical history of alcohol abuse, type 2 diabetes, paroxysmal A  fib presented with increased lethargy, shakiness concerning for withdrawals, found to be hypoglycemic with blood sugars in the 60s  · Patient has frequent hospitalizations for hypoglycemia and intoxication  He does not follow with a PCP education compliance is unclear    He is insistent that he requires 26 units of insulin daily and is reluctant to change regimen  · Monitor blood work for electrolyte abnormalities  · Accu-Checks, see plan below under diabetes  · Appreciate further endocrinology input

## 2023-02-01 PROBLEM — E87.6 HYPOKALEMIA: Status: RESOLVED | Noted: 2020-03-22 | Resolved: 2023-02-01

## 2023-02-01 LAB
ALBUMIN SERPL BCP-MCNC: 3.3 G/DL (ref 3.5–5)
ALP SERPL-CCNC: 55 U/L (ref 34–104)
ALT SERPL W P-5'-P-CCNC: 19 U/L (ref 7–52)
ANION GAP SERPL CALCULATED.3IONS-SCNC: 5 MMOL/L (ref 4–13)
AST SERPL W P-5'-P-CCNC: 17 U/L (ref 13–39)
BILIRUB SERPL-MCNC: 1.09 MG/DL (ref 0.2–1)
BUN SERPL-MCNC: 28 MG/DL (ref 5–25)
CALCIUM ALBUM COR SERPL-MCNC: 8.9 MG/DL (ref 8.3–10.1)
CALCIUM SERPL-MCNC: 8.3 MG/DL (ref 8.4–10.2)
CHLORIDE SERPL-SCNC: 100 MMOL/L (ref 96–108)
CO2 SERPL-SCNC: 26 MMOL/L (ref 21–32)
CREAT SERPL-MCNC: 1.4 MG/DL (ref 0.6–1.3)
ERYTHROCYTE [DISTWIDTH] IN BLOOD BY AUTOMATED COUNT: 11.8 % (ref 11.6–15.1)
GFR SERPL CREATININE-BSD FRML MDRD: 57 ML/MIN/1.73SQ M
GLUCOSE SERPL-MCNC: 151 MG/DL (ref 65–140)
GLUCOSE SERPL-MCNC: 196 MG/DL (ref 65–140)
GLUCOSE SERPL-MCNC: 220 MG/DL (ref 65–140)
GLUCOSE SERPL-MCNC: 226 MG/DL (ref 65–140)
GLUCOSE SERPL-MCNC: 235 MG/DL (ref 65–140)
HCT VFR BLD AUTO: 34.6 % (ref 36.5–49.3)
HGB BLD-MCNC: 12 G/DL (ref 12–17)
MAGNESIUM SERPL-MCNC: 1.9 MG/DL (ref 1.9–2.7)
MCH RBC QN AUTO: 32.6 PG (ref 26.8–34.3)
MCHC RBC AUTO-ENTMCNC: 34.7 G/DL (ref 31.4–37.4)
MCV RBC AUTO: 94 FL (ref 82–98)
PLATELET # BLD AUTO: 168 THOUSANDS/UL (ref 149–390)
PMV BLD AUTO: 11.9 FL (ref 8.9–12.7)
POTASSIUM SERPL-SCNC: 3.8 MMOL/L (ref 3.5–5.3)
PROT SERPL-MCNC: 6 G/DL (ref 6.4–8.4)
RBC # BLD AUTO: 3.68 MILLION/UL (ref 3.88–5.62)
SODIUM SERPL-SCNC: 131 MMOL/L (ref 135–147)
WBC # BLD AUTO: 7.81 THOUSAND/UL (ref 4.31–10.16)

## 2023-02-01 RX ORDER — CHLORDIAZEPOXIDE HYDROCHLORIDE 25 MG/1
25 CAPSULE, GELATIN COATED ORAL EVERY 12 HOURS
Status: DISCONTINUED | OUTPATIENT
Start: 2023-02-02 | End: 2023-02-01

## 2023-02-01 RX ORDER — INSULIN LISPRO 100 [IU]/ML
5 INJECTION, SOLUTION INTRAVENOUS; SUBCUTANEOUS
Status: DISCONTINUED | OUTPATIENT
Start: 2023-02-01 | End: 2023-02-03 | Stop reason: HOSPADM

## 2023-02-01 RX ORDER — CHLORDIAZEPOXIDE HYDROCHLORIDE 25 MG/1
25 CAPSULE, GELATIN COATED ORAL EVERY 12 HOURS
Status: DISCONTINUED | OUTPATIENT
Start: 2023-02-02 | End: 2023-02-03

## 2023-02-01 RX ORDER — INSULIN GLARGINE 100 [IU]/ML
15 INJECTION, SOLUTION SUBCUTANEOUS
Status: DISCONTINUED | OUTPATIENT
Start: 2023-02-01 | End: 2023-02-02

## 2023-02-01 RX ADMIN — INSULIN LISPRO 1 UNITS: 100 INJECTION, SOLUTION INTRAVENOUS; SUBCUTANEOUS at 15:33

## 2023-02-01 RX ADMIN — ACETAMINOPHEN 325MG 650 MG: 325 TABLET ORAL at 05:21

## 2023-02-01 RX ADMIN — INSULIN LISPRO 5 UNITS: 100 INJECTION, SOLUTION INTRAVENOUS; SUBCUTANEOUS at 15:33

## 2023-02-01 RX ADMIN — INSULIN GLARGINE 15 UNITS: 100 INJECTION, SOLUTION SUBCUTANEOUS at 21:23

## 2023-02-01 RX ADMIN — ACETAMINOPHEN 325MG 650 MG: 325 TABLET ORAL at 18:32

## 2023-02-01 RX ADMIN — Medication 12.5 MG: at 08:00

## 2023-02-01 RX ADMIN — THIAMINE HCL TAB 100 MG 100 MG: 100 TAB at 08:00

## 2023-02-01 RX ADMIN — HYDROCHLOROTHIAZIDE 25 MG: 25 TABLET ORAL at 08:00

## 2023-02-01 RX ADMIN — LIDOCAINE 1 PATCH: 50 PATCH TOPICAL at 08:00

## 2023-02-01 RX ADMIN — INSULIN LISPRO 3 UNITS: 100 INJECTION, SOLUTION INTRAVENOUS; SUBCUTANEOUS at 07:51

## 2023-02-01 RX ADMIN — CHLORDIAZEPOXIDE HYDROCHLORIDE 25 MG: 25 CAPSULE ORAL at 05:21

## 2023-02-01 RX ADMIN — FOLIC ACID 1 MG: 1 TABLET ORAL at 08:00

## 2023-02-01 RX ADMIN — INSULIN LISPRO 2 UNITS: 100 INJECTION, SOLUTION INTRAVENOUS; SUBCUTANEOUS at 11:18

## 2023-02-01 RX ADMIN — ACETAMINOPHEN 325MG 650 MG: 325 TABLET ORAL at 11:18

## 2023-02-01 RX ADMIN — MULTIPLE VITAMINS W/ MINERALS TAB 1 TABLET: TAB ORAL at 08:00

## 2023-02-01 RX ADMIN — ENOXAPARIN SODIUM 40 MG: 40 INJECTION SUBCUTANEOUS at 08:00

## 2023-02-01 RX ADMIN — INSULIN LISPRO 3 UNITS: 100 INJECTION, SOLUTION INTRAVENOUS; SUBCUTANEOUS at 11:21

## 2023-02-01 RX ADMIN — LISINOPRIL 40 MG: 20 TABLET ORAL at 08:00

## 2023-02-01 RX ADMIN — INSULIN LISPRO 2 UNITS: 100 INJECTION, SOLUTION INTRAVENOUS; SUBCUTANEOUS at 07:51

## 2023-02-01 RX ADMIN — TRAMADOL HYDROCHLORIDE 50 MG: 50 TABLET, COATED ORAL at 21:31

## 2023-02-01 NOTE — UTILIZATION REVIEW
NOTIFICATION OF INPATIENT ADMISSION   AUTHORIZATION REQUEST   SERVICING FACILITY:   [address] [place] [address] [facility]  Tax ID: [id number]  NPI: [id number] ATTENDING PROVIDER:  Attending Name and NPI#: Kasia Moore [[id number]]  Address: [address] [place] [address]  Phone: [phone]     ADMISSION INFORMATION:  Place of Service: Inpatient [address] [facility]  [address]  Place of Service Code: 21  Inpatient Admission Date/Time: 1/31/23 11:09 AM  Discharge Date/Time: No discharge date for patient encounter  Admitting Diagnosis Code/Description:  Alcohol withdrawal ([facility] ) [F10 939]  Hypokalemia [E87 6]  Alcohol abuse [F10 10]  Low blood sugar [E16 2]  Generalized weakness [R53 1]  Acute exacerbation of chronic low back pain [M54 50, G89 29]     UTILIZATION REVIEW CONTACT:  Mj Mckeon, Utilization   Network Utilization Review Department  Phone: [phone]  Fax: [phone]  Email: [email or web address]  Contact for approvals/pending authorizations, clinical reviews, and discharge  PHYSICIAN ADVISORY SERVICES:  Medical Necessity Denial & Njrv-vt-Sgiu Review  Phone: [phone]  Fax: [phone]  Email: [email or web address]

## 2023-02-01 NOTE — OCCUPATIONAL THERAPY NOTE
Occupational Therapy Evaluation     Patient Name: Marzena Carvajal  TVRRO'N Date: 2/1/2023  Problem List  Principal Problem:    Hypoglycemia  Active Problems:    Essential hypertension    Type 2 diabetes mellitus with stage 3 chronic kidney disease, with long-term current use of insulin (HCC)    Chronic pancreatitis ([facility] )    History of atrial fibrillation    Hypokalemia    Bilateral hearing loss    Alcohol withdrawal ([facility] )    Chronic low back pain    Past Medical History  Past Medical History:   Diagnosis Date    Alcohol abuse     Chronic pancreatitis (HCC)     Diabetes mellitus ([facility] )     Type 2    Pancreatitis     Paroxysmal A-fib (HCC)     Thrombocytopenia (HCC)      Past Surgical History  Past Surgical History:   Procedure Laterality Date    INCISION AND DRAINAGE OF WOUND N/A 8/16/2020    Procedure: INCISION AND DRAINAGE (I&D) HEAD/FACE;  Surgeon: Tamra Dewitt DMD;  [facility]: [facility];  Service: Maxillofacial    IR BIOPSY BONE MARROW  2/7/2019    REMOVAL OF IMPACTED TOOTH - COMPLETELY BONY N/A 8/16/2020    Procedure: EXTRACTION TEETH MULTIPLE #2, 3;  Surgeon: Tamra Dewitt DMD;  Location: [facility];  Service: Maxillofacial         02/01/23 0858   OT Last Visit   OT Visit Date 02/01/23   Note Type   Note type Evaluation   Additional Comments Pt presents supine in bed, agreeable to OT/PT co-eval   Pain Assessment   Pain Assessment Tool FLACC   Pain Location/Orientation Location: Back   Pain Rating: FLACC (Rest) - Face 0   Pain Rating: FLACC (Rest) - Legs 0   Pain Rating: FLACC (Rest) - Activity 0   Pain Rating: FLACC (Rest) - Cry 0   Pain Rating: FLACC (Rest) - Consolability 0   Score: FLACC (Rest) 0   Pain Rating: FLACC (Activity) - Face 1   Pain Rating: FLACC (Activity) - Legs 0   Pain Rating: FLACC (Activity) - Activity 0   Pain Rating: FLACC (Activity) - Cry 0   Pain Rating: FLACC (Activity) - Consolability 0   Score: FLACC (Activity) 1   Restrictions/Precautions   Weight Bearing Precautions Per Order No Other Precautions Fall Risk;Pain   Home Living   Type of [address]   Additional Comments Difficult to obtain PLOF/living environment as pt is perseverating on LB pain/medications and has difficulty hearing , pt pt   Prior Function   Lives With Family   Lifestyle   Autonomy Pt lives with family in a 2 story house  Difficult to obtain PLOF/living environment as pt is perseverating on LB pain/medications and has difficulty hearing , pt pt   Reciprocal Relationships Family   Intrinsic Gratification Unknown   Subjective   Subjective "I would like pain medications for home" via translating service   ADL   Where Assessed Edge of bed   Eating Assistance 7  Independent   Grooming Assistance 6  [address]  Supervision/Setup   LB Bathing Assistance 5  Supervision/Setup   UB Dressing Assistance 5  Supervision/Setup   LB Dressing Assistance 5  Supervision/Setup   Toileting Assistance  5  Supervision/Setup   Bed Mobility   Supine to Sit 6  Modified independent   Additional items Increased time required   Sit to Supine 6  Modified independent   Additional items Increased time required   Transfers   Sit to Stand 5  Supervision   Additional items Increased time required; Other  (FWW)   Stand to Sit 5  Supervision   Additional items Increased time required; Other  (FWW)   Functional Mobility   Functional Mobility 5  Supervision   Additional Comments FWW, slower paced   Balance   Static Sitting Normal   Dynamic Sitting Good   Static Standing Fair   Dynamic Standing Fair -   Ambulatory Fair -   Activity Tolerance   Activity Tolerance Patient tolerated treatment well;Patient limited by pain;Treatment limited secondary to medical complications (Comment)   Medical Staff Made Aware Michele PT, RN [address] for therapy   RUE Assessment   RUE Assessment WFL   LUE Assessment   LUE Assessment WFL   Hand Function   Gross Motor Coordination Functional   Fine Motor Coordination Functional   Sensation   Light Touch No apparent deficits   Proprioception   Proprioception No apparent deficits   Vision-Basic Assessment   Current Vision Wears glasses all the time   Vision - Complex Assessment   Ocular Range of Motion Intact   Perception   Inattention/Neglect Appears intact   Cognition   Overall Cognitive Status WFL   Arousal/Participation Responsive   Attention Other (Comment)  (difficult to assess with language barrier)   Orientation Level Oriented X4   Memory Within functional limits   Following Commands Follows one step commands with increased time or repetition   Comments language barrier   Assessment   Limitation Decreased ADL status; Decreased endurance;Decreased high-level ADLs   Prognosis Fair   Assessment Patient is a 46y o  year old male seen for OT eval s/p admit to [facility] on 1/29/2023 with hypoglycemia, alcohol withdrawal, chronic low back pain  Comorbidities affecting pt’s functional performance include a significant PMH of: HTN, DM2, pancreatitis, hx of a-fib, b/l hearing loss, alcohol dependence, acute encephalopathy, anemia, CKD, cocaine use, hypothermia, illicit drug use  Patient with active OT orders  Personal factors affecting pt at time of IE include: difficulty performing ADLs and IADLs, difficulty with functional mobility/transfers  Prior to admission, difficult to assess PLOF/prior living environment at this time; will f/u with case management   Upon evaluation, patient’s functional status as follows: eating: independent, grooming: MOD I, UB bathing: SBA, LB bathing: SBA, UB dressing: SBA, LB dressing: SBA, toileting: SBA; functional transfers: SBA, bed mobility: MOD I, functional mobility: SBA, sitting/standing tolerance: ~1 min with B UE support- due to the following deficits impacting occupational performance: decreased static/dynamic sitting/standing balance, decreased activity tolerance, decreased safety awareness, increased pain, and pt appears to be self-limiting  Patient would benefit from continued skilled OT therapy while in acute setting to address deficits as defined above and maximize (I) with ADLs and functional mobility  Occupational performance areas to address include: grooming, bathing, toileting, UB/LB dressing, functional mobility, household maintenance, and medication management  Based on the aforementioned OT evaluation, functional performance deficits, and assessments, pt has been identified as a moderate complexity evaluation  Pt requesting pain medications for home, as well as HA's  Co treatment with PT secondary to complex medical condition of pt, possible A of 2 required to achieve and maintain transitional movements, requiring the need of skilled therapeutic intervention of 2 therapists to achieve delivery of services  The patient's raw score on the AM-PAC Daily Activity inpatient short form is 19, standardized score is 40 22, greater than 39 4  Patients at this level are likely to benefit from discharge to home with OP PT  Please refer to the recommendation of the Occupational Therapist for safe discharge planning  Goals   Patient Goals to get pain meds for home and hearing aids   LTG Time Frame 10-14   Plan   Treatment Interventions ADL retraining;Functional transfer training; Endurance training; Compensatory technique education;Continued evaluation; Energy conservation; Activityengagement   Goal Expiration Date 02/15/23   OT Treatment Day 0   OT Frequency 1-2x/wk   Recommendation   OT Discharge Recommendation Home with outpatient rehabilitation  (OP PT)   AM-PAC Daily Activity Inpatient   Lower Body Dressing 3   Bathing 3   Toileting 3   Upper Body Dressing 3   Grooming 3   Eating 4   Daily Activity Raw Score 19   Daily Activity Standardized Score (Calc for Raw Score >=11) 40 22   AM-PAC Applied Cognition Inpatient   Following a Speech/Presentation 4   Understanding Ordinary Conversation 4   Taking Medications 3   Remembering Where Things Are Placed or Put Away 4   Remembering List of 4-5 Errands 4   Taking Care of Complicated Tasks 3   Applied Cognition Raw Score 22   Applied Cognition Standardized Score 47 83     Occupational Therapy goals: In 7-14 days:     1- Patient will verbalize and demonstrate use of energy conservation/deep breathing technique and work simplification skills during functional activity with no verbal cues  2- Patient will verbalize and demonstrate good body mechanics and joint protection techniques during ADLs/IADLs with no verbal cues     3- Pt will complete bed mobility at a Mod I level w/ G balance/safety demonstrated to decrease caregiver assistance required     4- Patient will increase OOB/ sitting tolerance to 2-4 hours per day for increased participation in self care and leisure tasks with no s/s of exertion  5-Patient will increase standing tolerance time to 5 minutes with unilateral UE support to complete sink level ADLs@ mod I level      5- Patient will increase sitting tolerance at edge of bed to 20 minutes to complete UB ADLs @ set up assist level       6- Pt will improve functional transfers to Mod I on/off all surfaces using DME as needed w/ G balance/safety     7- Patient will complete UB ADLs with [place] utilizing appropriate DME/AE PRN     8- Patient will complete LB ADLs with [place] utilizing appropriate DME/AE PRN     9- Patient will complete toileting hygiene with [place] with G hygiene/thoroughness utilizing appropriate DME/AE PRN     10- Pt will improve functional mobility during ADL/IADL/leisure tasks to Mod I using DME as needed w/ G balance/safety      11- Pt will be attentive 100% of the time during ongoing cognitive assessment w/ G participation to assist w/ safe d/c planning/recommendations     12- Pt will participate in simulated IADL management task to increase independence to Mod I w/ G safety and endurance     13- Pt will increase BUE strength by 1MM grade via AROM/AAROM/PROM exercises to increase independence in ADLs and transfers       Glenis Willis, OTR/L

## 2023-02-01 NOTE — PROGRESS NOTES
[facility]  Progress Note - [address] 1970, 46 y o  male MRN: [id number]  Unit/Bed#: E2 -01 Encounter: [id number]  Primary Care Provider: No primary care provider on file  Date and time admitted to hospital: 1/29/2023  6:55 AM    * Hypoglycemia  Assessment & Plan  · 45-year-old male with past medical history of alcohol abuse, type 2 diabetes, paroxysmal A  fib presented with increased lethargy, shakiness concerning for withdrawals, found to be hypoglycemic with blood sugars in the 60s  · Patient has frequent hospitalizations for hypoglycemia and intoxication  He does not follow with a PCP education compliance is unclear  He is insistent that he requires 26 units of insulin daily and is reluctant to change regimen  · Monitor blood work for electrolyte abnormalities  · Accu-Checks, see plan below under diabetes  · Appreciate further endocrinology input    Alcohol withdrawal ([facility] [address] )  Assessment & Plan  · Patient with chronic history of alcohol dependency and abuse, last drink was reportedly PTA  · Alcohol level on admission negative  · Continue thiamine, multivitamins and folate  · CIWA protocol  · decrease librium 25 mg q 12 hrs   · Discussed with father bedside who reports patient will drink a handle of liquor sometimes daily for a few weeks in a row  Pt reports he has quit in the past and can do it again says he will likely stop  Reviewed PCP notes from last visit in 2020 where similar conversation occurred  Offered services and patient declined bedside  Discussed with CM    Type 2 diabetes mellitus with stage 3 chronic kidney disease, with long-term current use of insulin [facility])  Assessment & Plan  Lab Results   Component Value Date    HGBA1C 10 2 (H) 01/30/2023     · Patient presented with hypoglycemia  Per chart review he reports checking blood sugar every 2-3 days as well as insulin 26 units every day    · Per chart review, office visit in 8/2020 displays 26 units Lantus  · Will require insulin refill at discharge  · A1c 10 2   · Increase Lantus to 15 units and Humalog 5 units TID  · Continue SSI, Accu-Cheks, monitor for hyperglycemia and hypoglycemic protocol  · Endocrinology consulted and input appreciated for insulin re-initiation and management  · Level 2 Carbohydrate diet    History of atrial fibrillation  Assessment & Plan  · History of paroxysmal A  fib, not on anticoagulation due to low Kimberly Vascor  · Continue metoprolol 12 5 twice daily    Hyponatremia  Assessment & Plan  · Sodium of 131 on AM labs corrected to 133  · Encourage oral intake   · Repeat cmp in AM    Essential hypertension  Assessment & Plan  · Blood pressure initially hypertensive, improved to -150  · Continue metoprolol, lisinopril, HCTZ    Bilateral hearing loss  Assessment & Plan  · With history, pt asking to see someone for it  · Per chart review ENT apt was set up and labeled as a no-show  · Father bedside aiding in translation due to [facility]  · Will need to re-attempt outpatient follow up with ENT    Hypokalemia-resolved as of 2/1/2023  Assessment & Plan  · On arrival, suspect likely in the setting of alcohol abuse  · Mg 1 6, repleted  · Continue to monitor electrolytes    Chronic low back pain  Assessment & Plan  · Pt with chronic low back pain for approximately 4 years (per father) with difficulty ambulating around the house at times  · Complains of lower back pain with sharp pains that radiate down his back legs  · Pt denies IV drug use, recent falls/back trauma, neck pain/stiffness, or weakness in legs  · W/o red flag symptoms on exam, output adequate per nursing staff  · Xray lumbar spine unremarkable  · Likely neuropathy secondary to diabetes  · Lidocaine patch daily, Scheduled tylenol  · Robaxin and pain management prn  · PT/OT recommending home with home health    Chronic pancreatitis [facility])  Assessment & Plan  · With history, CT abd/pelvis 2/2019 with "chronic calcifications likely related to pancreatitis in addition to pancreatic atrophy  Suspect a dilated pancreatic duct which is unchanged from the prior exam likely from chronic pancreatitis "  · Without fever, leukocytosis, abdominal pain, N/V, decreased appetite  · Lipase WNL however given chronicity may not be acutely elevated  Total bilirubin 1 09  Today decreased from 2 46 on IVF  · Repeat CMP in AM, serial abdominal exams  · Diet as tolerated      VTE Pharmacologic Prophylaxis:   Moderate Risk (Score 3-4) - Pharmacological DVT Prophylaxis Ordered: enoxaparin (Lovenox)  Patient Centered Rounds: I performed bedside rounds with nursing staff today  Discussions with Specialists or Other Care Team Provider: case mangement    Education and Discussions with Family / Patient: Updated  (father) at bedside  Time Spent for Care: 30 minutes  More than 50% of total time spent on counseling and coordination of care as described above  Current Length of Stay: 1 day(s)  Current Patient Status: Inpatient   Certification Statement: The patient will continue to require additional inpatient hospital stay due to titration of insulin regiment  Discharge Plan: Anticipate discharge tomorrow to home with home services  Code Status: Level 1 - Full Code    Subjective:   Patient was seen laying in bed today  Father was present at bedside to help with titration of insulin regimen  He reports that lidocaine patches and pain medication is helping but back pain is still present  He expressed understanding that he has to check is sugars at home, adjust how he takes insulin and follow up with a PCP  Objective:     Vitals:   Temp (24hrs), Av 1 °F (36 2 °C), Min:96 6 °F (35 9 °C), Max:97 9 °F (36 6 °C)    Temp:  [96 6 °F (35 9 °C)-97 9 °F (36 6 °C)] 97 9 °F (36 6 °C)  HR:  [68-79] 68  Resp:  [16-18] 17  BP: ()/(60-72) 106/63  SpO2:  [97 %-98 %] 98 %  Body mass index is 22 37 kg/m²  Input and Output Summary (last 24 hours):    No intake or output data in the 24 hours ending 02/01/23 1355    Physical Exam:   Physical Exam  Vitals and nursing note reviewed  Constitutional:       Appearance: Normal appearance  HENT:      Head: Normocephalic and atraumatic  Eyes:      General: No scleral icterus  Cardiovascular:      Rate and Rhythm: Normal rate and regular rhythm  Pulmonary:      Effort: Pulmonary effort is normal       Breath sounds: Normal breath sounds  No wheezing  Abdominal:      General: Abdomen is flat  Bowel sounds are normal       Palpations: Abdomen is soft  Tenderness: There is no abdominal tenderness  Musculoskeletal:         General: Tenderness (point tenderness lumbar spine) present  Right lower leg: No edema  Left lower leg: No edema  Skin:     General: Skin is warm and dry  Neurological:      Mental Status: He is alert  Mental status is at baseline        Comments: Sensation intact to light touch bilateral lower extremities    Psychiatric:         Mood and Affect: Mood normal          Behavior: Behavior normal           Additional Data:     Labs:  Results from last 7 days   Lab Units 02/01/23  0424 01/31/23  0114   WBC Thousand/uL 7 81 7 94   HEMOGLOBIN g/dL 12 0 12 1   HEMATOCRIT % 34 6* 33 2*   PLATELETS Thousands/uL 168 166   NEUTROS PCT %  --  61   LYMPHS PCT %  --  26   MONOS PCT %  --  10   EOS PCT %  --  2     Results from last 7 days   Lab Units 02/01/23  0424   SODIUM mmol/L 131*   POTASSIUM mmol/L 3 8   CHLORIDE mmol/L 100   CO2 mmol/L 26   BUN mg/dL 28*   CREATININE mg/dL 1 40*   ANION GAP mmol/L 5   CALCIUM mg/dL 8 3*   ALBUMIN g/dL 3 3*   TOTAL BILIRUBIN mg/dL 1 09*   ALK PHOS U/L 55   ALT U/L 19   AST U/L 17   GLUCOSE RANDOM mg/dL 220*     Results from last 7 days   Lab Units 01/29/23  0727   INR  0 96     Results from last 7 days   Lab Units 02/01/23  1055 02/01/23  0628 01/31/23  1534 01/31/23  1107 01/31/23  0632 01/30/23  2039 01/30/23  1425 01/30/23  1205 01/30/23  1038 01/30/23  0816 01/30/23  0601 01/30/23  0406   POC GLUCOSE mg/dl 235* 226* 202* 284* 316* 274* 286* 198* 185* 111 133 84     Results from last 7 days   Lab Units 01/30/23  0430   HEMOGLOBIN A1C % 10 2*           Lines/Drains:  Invasive Devices     Peripheral Intravenous Line  Duration           Peripheral IV 01/29/23 Right Antecubital 3 days                      Imaging: Reviewed radiology reports from this admission including: xray lumbar spine    Recent Cultures (last 7 days):         Last 24 Hours Medication List:   Current Facility-Administered Medications   Medication Dose Route Frequency Provider Last Rate   • acetaminophen  650 mg Oral Q6H [address] Alex Chinchilla PA-C     • chlordiazePOXIDE  25 mg Oral Q8H [address] Phong Azar MD     • enoxaparin  40 mg Subcutaneous Daily Dennys Morales MD     • folic acid  1 mg Oral Daily Dennys Morales MD     • hydrALAZINE  5 mg Intravenous Q6H PRN Dennys Morales MD     • lisinopril  40 mg Oral Daily Dennys Morales MD      And   • hydrochlorothiazide  25 mg Oral Daily Dennys Morales MD     • insulin glargine  15 Units Subcutaneous HS Luci Bhat MD     • insulin lispro  1-5 Units Subcutaneous TID AC Dennys Morales MD     • insulin lispro  5 Units Subcutaneous TID With Meals Luci Bhat MD     • lidocaine  1 patch Topical Daily Beatriz Blount PA-C     • methocarbamol  500 mg Oral Q6H PRN Beatriz Blount PA-C     • metoprolol tartrate  12 5 mg Oral BID Dennys Morales MD     • multivitamin-minerals  1 tablet Oral Daily Dennys Morales MD     • ondansetron  4 mg Intravenous Q6H PRN Dennys Morales MD     • thiamine  100 mg Oral Daily Dennys Morales MD     • traMADol  50 mg Oral Q6H PRN Cristina Jamison PA-C          Today, Patient Was Seen By: India Garza PA-C    **Please Note: This note may have been constructed using a voice recognition system  **

## 2023-02-01 NOTE — CASE MANAGEMENT
Case Management Assessment & Discharge Planning Note    Patient name Gigi Ospina  Location [address] [place] [address] /[address] 12-* MRN [id number]  : 1970 Date 2023       Current Admission Date: 2023  Current Admission Diagnosis:Hypoglycemia   Patient Active Problem List    Diagnosis Date Noted   • Chronic low back pain 2023   • [facility] withdrawal [facility] ) 2023   • Diarrhea 2023   • Seizure ([facility] [address] )    • Illicit drug use    • Leukocytosis 2022   • Hypothermia 2022   • Cocaine use 2022   • Abnormal chest x-ray 2022   • Bilateral hearing loss 2020   • Hypoglycemia 2020   • CKD (chronic kidney disease), stage III ([facility] [address] ) 2019   • Anemia 2019   • Hyponatremia 2019   • History of atrial fibrillation 2019   • Acute encephalopathy 2019   • Alcohol intoxication in active alcoholic with complication ([facility] [address] )    • Essential hypertension    • Type 2 diabetes mellitus with stage 3 chronic kidney disease, with long-term current use of insulin ([facility])    • Uncomplicated alcohol dependence ([facility] [address] )    • Paroxysmal A-fib ([facility] [address] )    • Chronic pancreatitis ([facility] [address] )    • Thrombocytopenia ([facility] )       LOS (days): 1  Geometric Mean LOS (GMLOS) (days): 2 90  Days to GMLOS:1 7     OBJECTIVE:    Risk of Unplanned Readmission Score: 30 42         Current admission status: Inpatient       Preferred Pharmacy:   [address] [place] [address]  Phone: [phone] Fax: [phone]    Primary Care Provider: No primary care provider on file  Primary Insurance: [address] [place] [address]  Secondary Insurance:     ASSESSMENT:  Active Health Care Proxies    There are no active Health Care Proxies on file                        Patient Information  Admitted from[de-identified] Home  Mental Status: Alert  During Assessment patient was accompanied by: Not accompanied during assessment  Assessment information provided by[de-identified] Patient  Primary Caregiver: Self  Support Systems: Self, Parent  [place] of Residence: [facility] do you live in?: [place] Go entry access options   Select all that apply : Stairs  Number of steps to enter home : 2  Do the steps have railings?: No  Type of Current Residence: Apartment  Floor Level: 2  Upon entering residence, is there a bedroom on the main floor (no further steps)?: Yes  Upon entering residence, is there a bathroom on the main floor (no further steps)?: Yes  In the last 12 months, was there a time when you were not able to pay the mortgage or rent on time?: No  In the last 12 months, how many places have you lived?: 1  In the last 12 months, was there a time when you did not have a steady place to sleep or slept in a shelter (including now)?: No  Homeless/housing insecurity resource given?: N/A  Living Arrangements: Lives w/ Parent(s)  Is patient a ?: No    Activities of Daily Living Prior to Admission  Functional Status: Independent  Completes ADLs independently?: No  Level of ADL dependence: Assistance (pt requires assistance with things such as meal prep, shopping, and medication management)  Ambulates independently?: Yes  Does patient use assisted devices?: No  Does patient currently own DME?: No  Does patient have a history of Outpatient Therapy (PT/OT)?: No  Does the patient have a history of Short-Term Rehab?: No  Does patient have a history of HHC?: No  Does patient currently have [facility]?: No         Patient Information Continued  Income Source: SSI/SSD  Does patient have prescription coverage?: Yes  Within the past 12 months, you worried that your food would run out before you got the money to buy more : Never true  Within the past 12 months, the food you bought just didn't last and you didn't have money to get more : Never true  Food insecurity resource given?: N/A  Does patient receive dialysis treatments?: No  Does patient have a history of substance abuse?: Yes  Historical substance use preference: Alcohol/ETOH, Cocaine  History of Withdrawal Symptoms: Delirium tremors  Is patient currently in treatment for substance abuse?: No  Patient declined treatment information  Does patient have a history of Mental Health Diagnosis?: No         Means of Transportation  Means of Transport to Appts[de-identified] Family transport  In the past 12 months, has lack of transportation kept you from medical appointments or from getting medications?: No  In the past 12 months, has lack of transportation kept you from meetings, work, or from getting things needed for daily living?: No  Was application for public transport provided?: N/A        DISCHARGE DETAILS:    Discharge planning discussed with[de-identified] Patient  Freedom of Choice: Yes  Comments - Freedom of Choice: Pt now reporting he is no longer interested in HOST services  Phone number for [facility] placed on AVS  CM contacted family/caregiver?: Yes  Were Treatment Team discharge recommendations reviewed with patient/caregiver?: Yes  Did patient/caregiver verbalize understanding of patient care needs?: Yes  Were patient/caregiver advised of the risks associated with not following Treatment Team discharge recommendations?: Yes    Contacts  Patient Contacts: Kunal Araujo  Relationship to Patient[de-identified] Family  Contact Method: In Person  Reason/Outcome: Discharge [address]         Is the patient interested in [facility] at discharge?: No    DME Referral Provided  Referral made for DME?: No    Other Referral/Resources/Interventions Provided:  Interventions: Substance Abuse Treatment         Treatment Team Recommendation: Home  Discharge Destination Plan[de-identified] Home  Transport at Discharge : Family                                      Additional Comments: CM spoke with pt and father at the bedside utilizing [address] interpretor via [employer]  Pt reports no longer being interested in drug and alcohol resources   CM provided education on services and pt still reports not being interested  CM did place phone number for MARs on pt's discharge paperwork if he is interested in following up once he get home  CM also expressed the importance of setting up an appointment with a PCP for his insuling management and to help him in getting hearing aids  Phone number for [employer] placed on pt's paperwork  Pt reports being indepedent in the home aside from medication management, food prep, and grocery shopping  Reports no use of assistive device

## 2023-02-01 NOTE — PLAN OF CARE
Problem: PHYSICAL THERAPY ADULT  Goal: Performs mobility at highest level of function for planned discharge setting  See evaluation for individualized goals  Description: Treatment/Interventions: Functional transfer training, LE strengthening/ROM, Elevations, Therapeutic exercise, Endurance training, Patient/family training, Bed mobility, Gait training, Spoke to nursing, OT          See flowsheet documentation for full assessment, interventions and recommendations  Note: Prognosis: Fair  Problem List: Decreased strength, Decreased endurance, Impaired balance, Decreased mobility, Decreased cognition, Impaired judgement, Decreased safety awareness, Pain  Assessment: Pt  52 y o male admitted for Hypoglycemia w/ Alcohol withdrawal ([facility] [address] ),  Hypokalemia & chronic LBP  Pt referred to PT for mobility assessment & D/C planning w/ orders of up & OOB as tolerated  Please see above for information re: home set-up & PLOF as well as objective findings during PT assessment  ?PLOF & home set up as pt limited/reluctant historian during session  On eval, pt functioning below baseline hence will continue skilled PT to improve function & safety  Pt require modified I w/ bed mobility; S for transfers & amb w/ RW + cues for techniques & safety  Gait deviations as above, slow w/ dec foot clearance & strides but no gross LOB noted  Dec amb tolerance 2* to back pain  The patient's AM-PAC Basic Mobility Inpatient Short Form Raw Score is 20  A Raw score of greater than 16 suggests the patient may benefit from discharge to home  Please also refer to the recommendation of the Physical Therapist for safe discharge planning  From PT standpoint, will anticipate safe return to home w/ family support when medically cleared  Will recommend OPPT & RW at D/C  No SOB & dizziness reported t/o session   Nsg staff most recent vital signs as follows: /63 (BP Location: Left arm)   Pulse 68   Temp 97 9 °F (36 6 °C) (Temporal)   Resp 17   Ht 5' 9" (1 753 m)   Wt 68 7 kg (151 lb 7 3 oz)   SpO2 98%   BMI 22 37 kg/m²   At end of session, pt back in bed in stable condition, call bell & phone in reach, bed alarm activated, all lines intact  Fall precautions reinforced w/ good understanding  CM to follow  Nsg staff to continue to mobilized pt (OOB in chair for all meals & ambulate in room/unit) as tolerated to prevent further decline in function  Nsg notified  Co-eval was necessary to complete this PT eval for the pt's best interest given pt's medical acuity & complexity  PT Discharge Recommendation: Home with outpatient rehabilitation    See flowsheet documentation for full assessment

## 2023-02-01 NOTE — PLAN OF CARE
Problem: METABOLIC, FLUID AND ELECTROLYTES - ADULT  Goal: Electrolytes maintained within normal limits  Description: INTERVENTIONS:  - Monitor labs and assess patient for signs and symptoms of electrolyte imbalances  - Administer electrolyte replacement as ordered  - Monitor response to electrolyte replacements, including repeat lab results as appropriate  - Instruct patient on fluid and nutrition as appropriate  Outcome: Progressing  Goal: Fluid balance maintained  Description: INTERVENTIONS:  - Monitor labs   - Monitor I/O and WT  - Instruct patient on fluid and nutrition as appropriate  - Assess for signs & symptoms of volume excess or deficit  Outcome: Progressing  Goal: Glucose maintained within target range  Description: INTERVENTIONS:  - Monitor Blood Glucose as ordered  - Assess for signs and symptoms of hyperglycemia and hypoglycemia  - Administer ordered medications to maintain glucose within target range  - Assess nutritional intake and initiate nutrition service referral as needed  Outcome: Progressing     Problem: Nutrition/Hydration-ADULT  Goal: Nutrient/Hydration intake appropriate for improving, restoring or maintaining nutritional needs  Description: Monitor and assess patient's nutrition/hydration status for malnutrition  Collaborate with interdisciplinary team and initiate plan and interventions as ordered  Monitor patient's weight and dietary intake as ordered or per policy  Utilize nutrition screening tool and intervene as necessary  Determine patient's food preferences and provide high-protein, high-caloric foods as appropriate       INTERVENTIONS:  - Monitor oral intake, urinary output, labs, and treatment plans  - Assess nutrition and hydration status and recommend course of action  - Evaluate amount of meals eaten  - Assist patient with eating if necessary   - Allow adequate time for meals  - Recommend/ encourage appropriate diets, oral nutritional supplements, and vitamin/mineral supplements  - Order, calculate, and assess calorie counts as needed  - Recommend, monitor, and adjust tube feedings and TPN/PPN based on assessed needs  - Assess need for intravenous fluids  - Provide specific nutrition/hydration education as appropriate  - Include patient/family/caregiver in decisions related to nutrition  Outcome: Progressing     Problem: COPING  Goal: Pt/Family able to verbalize concerns and demonstrate effective coping strategies  Description: INTERVENTIONS:  - Assist patient/family to identify coping skills, available support systems and cultural and spiritual values  - Provide emotional support, including active listening and acknowledgement of concerns of patient and caregivers  - Reduce environmental stimuli, as able  - Provide patient education  - Assess for spiritual pain/suffering and initiate spiritual care, including notification of [facility] or bjorn based community as needed  - Assess effectiveness of coping strategies  Outcome: Progressing  Goal: Will report anxiety at manageable levels  Description: INTERVENTIONS:  - Administer medication as ordered  - Teach and encourage coping skills  - Provide emotional support  - Assess patient/family for anxiety and ability to cope  Outcome: Progressing     Problem: DECISION MAKING  Goal: Pt/Family able to effectively weigh alternatives and participate in decision making related to treatment and care  Description: INTERVENTIONS:  - Identify decision maker  - Determine when there are differences among patient's view, family's view, and healthcare provider's view of patient condition and care goals  - Facilitate patient/family articulation of goals for care  - Help patient/family identify pros/cons of alternative solutions  - Provide information as requested by patient/family  - Respect patient/family rights related to privacy and sharing information   - Serve as a liaison between patient, family and health care team  - Initiate consults as appropriate (Ethics Team, Palliative Care, Family Care Conference, etc )  Outcome: Progressing     Problem: SELF HARM  Goal: Effect of psychiatric condition will be minimized and patient will be protected from self harm  Description: INTERVENTIONS:  - Assess impact of patient's symptoms on level of functioning, self-care needs and offer support as indicated  - Assess patient/family knowledge of depression, impact on illness and need for teaching  - Provide emotional support, presence and reassurance  - Assess for possible suicidal thoughts, ideation or self-harm  If patient expresses suicidal thoughts or statements do not leave alone, notify physician/AP immediately, initiate suicide precautions, and determine need for continual observation    - initiate consults and referrals as appropriate (a mental health professional, Spiritual Care  Outcome: Progressing     Problem: SUBSTANCE USE/ABUSE  Goal: Will have no detox symptoms and will verbalize plan for changing substance-related behavior  Description: INTERVENTIONS:  - Monitor physical status and assess for symptoms of withdrawal  - Administer medication as ordered  - Provide emotional support with 1 on 1 interaction with staff  - Encourage recovery focused program/ addiction education  - Assess for verbalization of changing behaviors related to substance abuse  - Initiate consults and referrals as appropriate (Case Management, Spiritual Care, etc )  Outcome: Progressing  Goal: By discharge, will develop insight into their chemical dependency and sustain motivation to continue in recovery  Description: INTERVENTIONS:  - Attends all daily group sessions and scheduled AA groups  - Actively practices coping skills through participation in the therapeutic community and adherence to program rules  - Reviews and completes assignments from individual treatment plan  - Assist patient development of understanding of their personal cycle of addiction and relapse triggers  Outcome: Progressing  Goal: By discharge, patient will have ongoing treatment plan addressing chemical dependency  Description: INTERVENTIONS:  - Assist patient with resources and/or appointments for ongoing recovery based living  Outcome: Progressing

## 2023-02-01 NOTE — PHYSICAL THERAPY NOTE
PT EVALUATION    Pt  Name: Kaley Petersen  Pt  Age: 46 y o  MRN: [id number]  LENGTH OF STAY: 1      Admitting Diagnoses:   Alcohol withdrawal ([facility] ) [F10 939]  Hypokalemia [E87 6]  Alcohol abuse [F10 10]  Low blood sugar [E16 2]  Generalized weakness [R53 1]  Acute exacerbation of chronic low back pain [M54 50, G89 29]    Past Medical History:   Diagnosis Date    Alcohol abuse     Chronic pancreatitis ([place] [address] )     Diabetes mellitus ([place] [address] )     Type 2    Pancreatitis     Paroxysmal A-fib ([place] [address] )     Thrombocytopenia ([place] [address] )        Past Surgical History:   Procedure Laterality Date    INCISION AND DRAINAGE OF WOUND N/A 8/16/2020    Procedure: INCISION AND DRAINAGE (I&D) HEAD/FACE;  Surgeon: Gabriel Woods DMD;  Location: [facility];  Service: Maxillofacial    IR BIOPSY BONE MARROW  2/7/2019    REMOVAL OF IMPACTED TOOTH - COMPLETELY BONY N/A 8/16/2020    Procedure: EXTRACTION TEETH MULTIPLE #2, 3;  Surgeon: Gabriel Woods DMD;  Location: [facility];  Service: Maxillofacial       Imaging Studies:  XR spine lumbar 2 or 3 views injury   Final Result by Lyndsey Dash MD (01/31 1432)      Unremarkable radiographic appearance of lumbar spine  Pancreatic calcifications consistent with the sequela of chronic pancreatitis  Workstation performed: [id number]         XR chest 1 view portable   ED Interpretation by Martha Yañez DO (01/29 7885)   Xray reviewed and independently interpreted by me: no acute infiltrates      Final Result by Patrina Gitelman, MD (01/29 1022)      No acute cardiopulmonary disease  Workstation performed: [id number]               02/01/23 0916   PT Last Visit   PT Visit Date 02/01/23   Note Type   Note type Evaluation   Pain Assessment   Pain Assessment Tool FLACC   Pain Location/Orientation Location: Back   Hospital Pain Intervention(s) Medication (See MAR); Repositioned; Ambulation/increased activity; Emotional support; Rest   Pain Rating: FLACC (Rest) - Face 0 Pain Rating: FLACC (Rest) - Legs 0   Pain Rating: FLACC (Rest) - Activity 0   Pain Rating: FLACC (Rest) - Cry 0   Pain Rating: FLACC (Rest) - Consolability 0   Score: FLACC (Rest) 0   Pain Rating: FLACC (Activity) - Face 1   Pain Rating: FLACC (Activity) - Legs 0   Pain Rating: FLACC (Activity) - Activity 1   Pain Rating: FLACC (Activity) - Cry 1   Pain Rating: FLACC (Activity) - Consolability 0   Score: FLACC (Activity) 3   Restrictions/Precautions   Weight Bearing Precautions Per Order No   Other Precautions Chair Alarm; Bed Alarm; Fall Risk;Pain   Home Living   Type of [address] Two level   Additional Comments pt difficult to obtain home set up information as pt complaining not able to hear the [employer]  & c/o severe back pain   Prior Function   [facility]ives With [facility] Help From Family   Comments pt difficult to obtain PLOF information as pt perseverating about not able to hear the [employer]  & his severe back pain   General   Additional Pertinent History significant for ETOH abuse   Family/Caregiver Present No   Cognition   Arousal/Participation Alert   Attention Difficulty attending to directions   Following Commands Follows one step commands with increased time or repetition   Comments (+) language barrier; translation via ipad [employer],  # [id number]; pt irritable; reluctant to participate in PT/OT evals; [place]   Subjective   Subjective Pt require encouragement to participate in PT/OT evals  RUE Assessment   RUE Assessment   (refer to OT)   LUE Assessment   LUE Assessment   (refer to OT)   RLE Assessment   RLE Assessment WFL  (4-/5 grossly)   LLE Assessment   LLE Assessment WFL  (4-/5 grossly)   Bed Mobility   Supine to Sit 6  Modified independent   Additional items Increased time required;HOB elevated   Sit to Supine 6  Modified independent   Additional items HOB elevated; Increased time required   Transfers   Sit to Stand 5  Supervision   Additional items Increased time required;Verbal cues   Stand to Sit 5  Supervision   Additional items Increased time required;Verbal cues   Ambulation/Elevation   Gait pattern Wide ELLIOT; Decreased foot clearance; Excessively slow   Gait Assistance 5  Supervision   Additional items Verbal cues   Assistive Device Rolling walker   Distance 20'x1   Ambulation/Elevation Additional Comments no gross LOB noted; dec amb tolerance 2* to pain   Balance   Static Sitting Good   Dynamic Sitting Fair +   Static Standing Fair   Dynamic Standing Fair -   Ambulatory Fair -   Activity Tolerance   Activity Tolerance Patient limited by pain   Medical Staff Made Aware OTR [address] [facility]   Nurse Made Aware RN [place]   Assessment   Prognosis Fair   Problem List Decreased strength;Decreased endurance; Impaired balance;Decreased mobility; Decreased cognition; Impaired judgement;Decreased safety awareness;Pain   Assessment Pt  52 y o male admitted for Hypoglycemia w/ Alcohol withdrawal ([facility] ),  Hypokalemia & chronic LBP  Pt referred to PT for mobility assessment & D/C planning w/ orders of up & OOB as tolerated  Please see above for information re: home set-up & PLOF as well as objective findings during PT assessment  ?PLOF & home set up as pt limited/reluctant historian during session  On eval, pt functioning below baseline hence will continue skilled PT to improve function & safety  Pt require modified I w/ bed mobility; S for transfers & amb w/ RW + cues for techniques & safety  Gait deviations as above, slow w/ dec foot clearance & strides but no gross LOB noted  Dec amb tolerance 2* to back pain  The patient's AM-PAC Basic Mobility Inpatient Short Form Raw Score is 20  A Raw score of greater than 16 suggests the patient may benefit from discharge to home  Please also refer to the recommendation of the Physical Therapist for safe discharge planning  From PT standpoint, will anticipate safe return to home w/ family support when medically cleared   Will recommend OPPT & RW at D/C  No SOB & dizziness reported t/o session  Nsg staff most recent vital signs as follows: /63 (BP Location: Left arm)   Pulse 68   Temp 97 9 °F (36 6 °C) (Temporal)   Resp 17   Ht 5' 9" (1 753 m)   Wt 68 7 kg (151 lb 7 3 oz)   SpO2 98%   BMI 22 37 kg/m²   At end of session, pt back in bed in stable condition, call bell & phone in reach, bed alarm activated, all lines intact  Fall precautions reinforced w/ good understanding  CM to follow  Nsg staff to continue to mobilized pt (OOB in chair for all meals & ambulate in room/unit) as tolerated to prevent further decline in function  Nsg notified  Co-eval was necessary to complete this PT eval for the pt's best interest given pt's medical acuity & complexity  Goals   Patient Goals to get pain meds for home & hearing aids   STG Expiration Date 02/08/23   Short Term Goal #1 Goals to be met in 7 days; pt will be able to: 1) inc strength & balance by 1/2 grade to improve overall functional mobility & dec fall risk; 2) inc bed mobility to I for pt to be able to get in/OOB safely w/ proper techniques 100% of the time, to dec caregiver burden & safely function at home; 3) inc transfers to modified I for pt to transition safely from one surface to another w/o % of the time, to dec caregiver burden & safely function at home; 4) inc amb w/ appropriate AD approx  >80' w/ modified I for pt to ambulate household distances w/o any % of the time, to dec caregiver burden & safely function at home; 5) negotiate stairs w/ S for inc safety during stair mgt inside/outside of home & dec caregiver burden; 6) pt/caregiver ed   PT Treatment Day 0   Plan   Treatment/Interventions Functional transfer training;LE strengthening/ROM; Elevations; Therapeutic exercise; Endurance training;Patient/family training;Bed mobility;Gait training;Spoke to nursing;OT   PT Frequency 2-3x/wk   Recommendation   PT Discharge Recommendation Home with outpatient rehabilitation Additional Comments Restorative amb in unit   AM-PAC Basic Mobility Inpatient   Turning in Flat Bed Without Bedrails 4   Lying on Back to Sitting on Edge of Flat Bed Without Bedrails 4   Moving Bed to Chair 3   Standing Up From Chair Using Arms 3   Walk in Room 3   Climb 3-5 Stairs With Railing 3   Basic Mobility Inpatient Raw Score 20   Basic Mobility Standardized Score 43 99   Highest Level Of Mobility   JH-HLM Goal 6: Walk 10 steps or more   JH-HLM Achieved 6: Walk 10 steps or more   End of Consult   Patient Position at End of Consult Supine; All needs within reach;Bed/Chair alarm activated   End of Consult Comments Pt in stable condition  All needs in reach  Bed alarm activated     Hx/personal factors: co-morbidities, inaccessible home, use of AD, pain, fall risk, and assist w/ ADL's  Examination: dec mobility, dec balance, dec endurance, dec amb, risk for falls, pain, dec cognition  Clinical: unpredictable (ongoing medical status, abnormal lab values, risk for falls, and pain mgt)  Complexity: high    [employer]

## 2023-02-01 NOTE — QUICK NOTE
Sugars reviewed-above goal    Increase Lantus to 15 units at bedtime-increase Humalog to 5 units before meals    Monitor blood sugars before meals and bedtime and adjust accordingly

## 2023-02-01 NOTE — PLAN OF CARE
Problem: OCCUPATIONAL THERAPY ADULT  Goal: Performs self-care activities at highest level of function for planned discharge setting  See evaluation for individualized goals  Description: Treatment Interventions: ADL retraining, Functional transfer training, Endurance training, Compensatory technique education, Continued evaluation, Energy conservation, Activityengagement          See flowsheet documentation for full assessment, interventions and recommendations  Note: Limitation: Decreased ADL status, Decreased endurance, Decreased high-level ADLs  Prognosis: Fair  Assessment: Patient is a 46y o  year old male seen for OT eval s/p admit to [facility] on 1/29/2023 with hypoglycemia, alcohol withdrawal, chronic low back pain  Comorbidities affecting pt’s functional performance include a significant PMH of: HTN, DM2, pancreatitis, hx of a-fib, b/l hearing loss, alcohol dependence, acute encephalopathy, anemia, CKD, cocaine use, hypothermia, illicit drug use  Patient with active OT orders  Personal factors affecting pt at time of IE include: difficulty performing ADLs and IADLs, difficulty with functional mobility/transfers  Prior to admission, difficult to assess PLOF/prior living environment at this time; will f/u with case management  Upon evaluation, patient’s functional status as follows: eating: independent, grooming: MOD I, UB bathing: SBA, LB bathing: SBA, UB dressing: SBA, LB dressing: SBA, toileting: SBA; functional transfers: SBA, bed mobility: MOD I, functional mobility: SBA, sitting/standing tolerance: ~1 min with B UE support- due to the following deficits impacting occupational performance: decreased static/dynamic sitting/standing balance, decreased activity tolerance, decreased safety awareness, increased pain, and pt appears to be self-limiting   Patient would benefit from continued skilled OT therapy while in acute setting to address deficits as defined above and maximize (I) with ADLs and functional mobility  Occupational performance areas to address include: grooming, bathing, toileting, UB/LB dressing, functional mobility, household maintenance, and medication management  Based on the aforementioned OT evaluation, functional performance deficits, and assessments, pt has been identified as a moderate complexity evaluation  Pt requesting pain medications for home, as well as HA's  Co treatment with PT secondary to complex medical condition of pt, possible A of 2 required to achieve and maintain transitional movements, requiring the need of skilled therapeutic intervention of 2 therapists to achieve delivery of services  The patient's raw score on the AM-PAC Daily Activity inpatient short form is 19, standardized score is 40 22, greater than 39 4  Patients at this level are likely to benefit from discharge to home with OP PT  Please refer to the recommendation of the Occupational Therapist for safe discharge planning       OT Discharge Recommendation: Home with outpatient rehabilitation (OP PT)

## 2023-02-02 LAB
AMPHETAMINES SERPL QL SCN: NEGATIVE
ANION GAP SERPL CALCULATED.3IONS-SCNC: 6 MMOL/L (ref 4–13)
BARBITURATES UR QL: NEGATIVE
BENZODIAZ UR QL: POSITIVE
BUN SERPL-MCNC: 32 MG/DL (ref 5–25)
CALCIUM SERPL-MCNC: 8.7 MG/DL (ref 8.4–10.2)
CHLORIDE SERPL-SCNC: 101 MMOL/L (ref 96–108)
CO2 SERPL-SCNC: 23 MMOL/L (ref 21–32)
COCAINE UR QL: NEGATIVE
CREAT SERPL-MCNC: 1.41 MG/DL (ref 0.6–1.3)
ERYTHROCYTE [DISTWIDTH] IN BLOOD BY AUTOMATED COUNT: 11.6 % (ref 11.6–15.1)
GFR SERPL CREATININE-BSD FRML MDRD: 56 ML/MIN/1.73SQ M
GLUCOSE SERPL-MCNC: 137 MG/DL (ref 65–140)
GLUCOSE SERPL-MCNC: 166 MG/DL (ref 65–140)
GLUCOSE SERPL-MCNC: 202 MG/DL (ref 65–140)
GLUCOSE SERPL-MCNC: 245 MG/DL (ref 65–140)
GLUCOSE SERPL-MCNC: 301 MG/DL (ref 65–140)
HCT VFR BLD AUTO: 31.8 % (ref 36.5–49.3)
HGB BLD-MCNC: 11 G/DL (ref 12–17)
MAGNESIUM SERPL-MCNC: 1.7 MG/DL (ref 1.9–2.7)
MCH RBC QN AUTO: 31.9 PG (ref 26.8–34.3)
MCHC RBC AUTO-ENTMCNC: 34.6 G/DL (ref 31.4–37.4)
MCV RBC AUTO: 92 FL (ref 82–98)
METHADONE UR QL: NEGATIVE
OPIATES UR QL SCN: NEGATIVE
OXYCODONE+OXYMORPHONE UR QL SCN: NEGATIVE
PCP UR QL: NEGATIVE
PLATELET # BLD AUTO: 154 THOUSANDS/UL (ref 149–390)
PMV BLD AUTO: 11.6 FL (ref 8.9–12.7)
POTASSIUM SERPL-SCNC: 4.2 MMOL/L (ref 3.5–5.3)
RBC # BLD AUTO: 3.45 MILLION/UL (ref 3.88–5.62)
SODIUM SERPL-SCNC: 130 MMOL/L (ref 135–147)
THC UR QL: NEGATIVE
WBC # BLD AUTO: 8.26 THOUSAND/UL (ref 4.31–10.16)

## 2023-02-02 RX ORDER — SODIUM CHLORIDE 9 MG/ML
50 INJECTION, SOLUTION INTRAVENOUS CONTINUOUS
Status: DISCONTINUED | OUTPATIENT
Start: 2023-02-02 | End: 2023-02-03 | Stop reason: HOSPADM

## 2023-02-02 RX ORDER — MAGNESIUM SULFATE 1 G/100ML
1 INJECTION INTRAVENOUS ONCE
Status: COMPLETED | OUTPATIENT
Start: 2023-02-02 | End: 2023-02-02

## 2023-02-02 RX ORDER — INSULIN GLARGINE 100 [IU]/ML
20 INJECTION, SOLUTION SUBCUTANEOUS
Status: DISCONTINUED | OUTPATIENT
Start: 2023-02-02 | End: 2023-02-03 | Stop reason: HOSPADM

## 2023-02-02 RX ADMIN — FOLIC ACID 1 MG: 1 TABLET ORAL at 08:32

## 2023-02-02 RX ADMIN — THIAMINE HCL TAB 100 MG 100 MG: 100 TAB at 08:32

## 2023-02-02 RX ADMIN — SODIUM CHLORIDE 50 ML/HR: 0.9 INJECTION, SOLUTION INTRAVENOUS at 07:50

## 2023-02-02 RX ADMIN — MAGNESIUM SULFATE HEPTAHYDRATE 1 G: 1 INJECTION, SOLUTION INTRAVENOUS at 07:46

## 2023-02-02 RX ADMIN — ACETAMINOPHEN 325MG 650 MG: 325 TABLET ORAL at 00:13

## 2023-02-02 RX ADMIN — LISINOPRIL 40 MG: 20 TABLET ORAL at 08:32

## 2023-02-02 RX ADMIN — INSULIN LISPRO 3 UNITS: 100 INJECTION, SOLUTION INTRAVENOUS; SUBCUTANEOUS at 06:44

## 2023-02-02 RX ADMIN — ACETAMINOPHEN 325MG 650 MG: 325 TABLET ORAL at 06:07

## 2023-02-02 RX ADMIN — CHLORDIAZEPOXIDE HYDROCHLORIDE 25 MG: 25 CAPSULE ORAL at 16:22

## 2023-02-02 RX ADMIN — INSULIN LISPRO 5 UNITS: 100 INJECTION, SOLUTION INTRAVENOUS; SUBCUTANEOUS at 06:44

## 2023-02-02 RX ADMIN — LIDOCAINE 1 PATCH: 50 PATCH TOPICAL at 08:32

## 2023-02-02 RX ADMIN — INSULIN GLARGINE 20 UNITS: 100 INJECTION, SOLUTION SUBCUTANEOUS at 21:37

## 2023-02-02 RX ADMIN — Medication 12.5 MG: at 08:32

## 2023-02-02 RX ADMIN — INSULIN LISPRO 5 UNITS: 100 INJECTION, SOLUTION INTRAVENOUS; SUBCUTANEOUS at 16:20

## 2023-02-02 RX ADMIN — HYDROCHLOROTHIAZIDE 25 MG: 25 TABLET ORAL at 08:32

## 2023-02-02 RX ADMIN — MULTIPLE VITAMINS W/ MINERALS TAB 1 TABLET: TAB ORAL at 08:32

## 2023-02-02 RX ADMIN — ENOXAPARIN SODIUM 40 MG: 40 INJECTION SUBCUTANEOUS at 08:32

## 2023-02-02 RX ADMIN — INSULIN LISPRO 2 UNITS: 100 INJECTION, SOLUTION INTRAVENOUS; SUBCUTANEOUS at 16:18

## 2023-02-02 RX ADMIN — ACETAMINOPHEN 325MG 650 MG: 325 TABLET ORAL at 11:58

## 2023-02-02 RX ADMIN — ACETAMINOPHEN 325MG 650 MG: 325 TABLET ORAL at 17:27

## 2023-02-02 NOTE — ASSESSMENT & PLAN NOTE
· With history, pt asking to see someone for it  · Per chart review ENT apt was set up and labeled as a no-show  · Father bedside aiding in translation due to [facility]  · Will need to re-attempt outpatient follow up with ENT

## 2023-02-02 NOTE — ASSESSMENT & PLAN NOTE
Lab Results   Component Value Date    HGBA1C 10 2 (H) 01/30/2023     · Patient presented with hypoglycemia  Per chart review he reports checking blood sugar every 2-3 days as well as insulin 26 units every day    · Per chart review, office visit in 8/2020 displays 26 units Lantus  · Will require insulin refill at discharge  · A1c 10 2   · Increase Lantus to 20 units and Humalog 5 units TID  · Continue SSI, Accu-Cheks, monitor for hyperglycemia and hypoglycemic protocol  · Endocrinology consulted and input appreciated for insulin re-initiation and management  · Level 2 Carbohydrate diet

## 2023-02-02 NOTE — ASSESSMENT & PLAN NOTE
· With history, CT abd/pelvis 2/2019 with "chronic calcifications likely related to pancreatitis in addition to pancreatic atrophy  Suspect a dilated pancreatic duct which is unchanged from the prior exam likely from chronic pancreatitis "  · Without fever, leukocytosis, abdominal pain, N/V, decreased appetite  · Lipase WNL however given chronicity may not be acutely elevated   Total bilirubin 1 09 decreased from 2 46 on IVF  · Repeat CMP in AM, serial abdominal exams  · Diet as tolerated

## 2023-02-02 NOTE — PROGRESS NOTES
[address] [facility]  Progress Note - [address] 1970, 46 y o  male MRN: [id number]  Unit/Bed#: E2 -01 Encounter: [id number]  Primary Care Provider: No primary care provider on file  Date and time admitted to hospital: 1/29/2023  6:55 AM    * Hypoglycemia  Assessment & Plan  · 55-year-old male with past medical history of alcohol abuse, type 2 diabetes, paroxysmal A  fib presented with increased lethargy, shakiness concerning for withdrawals, found to be hypoglycemic with blood sugars in the 60s  · Patient has frequent hospitalizations for hypoglycemia and intoxication  He does not follow with a PCP education compliance is unclear  Patient was agreeable to changes in insulin regimen today  · Monitor blood work for electrolyte abnormalities  · Accu-Checks, see plan below under diabetes  · Appreciate further endocrinology input    Alcohol withdrawal ([facility] [address] )  Assessment & Plan  · Patient with chronic history of alcohol dependency and abuse, last drink was reportedly PTA  · Alcohol level on admission negative  · Continue thiamine, multivitamins and folate  · CIWA protocol  · Decrease librium 25 mg q 12 hrs   · Discussed with father bedside who reports patient will drink a handle of liquor sometimes daily for a few weeks in a row  Pt reports he has quit in the past and can do it again says he will likely stop  Reviewed PCP notes from last visit in 2020 where similar conversation occurred  Offered services and patient declined bedside  Discussed with CM    Type 2 diabetes mellitus with stage 3 chronic kidney disease, with long-term current use of insulin [facility])  Assessment & Plan  Lab Results   Component Value Date    HGBA1C 10 2 (H) 01/30/2023     · Patient presented with hypoglycemia  Per chart review he reports checking blood sugar every 2-3 days as well as insulin 26 units every day    · Per chart review, office visit in 8/2020 displays 26 units Lantus  · Will require insulin refill at discharge  · A1c 10 2   · Increase Lantus to 20 units and Humalog 5 units TID  · Continue SSI, Accu-Cheks, monitor for hyperglycemia and hypoglycemic protocol  · Endocrinology consulted and input appreciated for insulin re-initiation and management  · Level 2 Carbohydrate diet    History of atrial fibrillation  Assessment & Plan  · History of paroxysmal A  fib, not on anticoagulation due to low Kimberly Vascor  · Continue metoprolol 12 5 twice daily    Hyponatremia  Assessment & Plan  · Sodium of 130 on AM labs  · Start gentle IVF    Essential hypertension  Assessment & Plan  · Blood pressure initially hypertensive, improved to SBP   · Continue metoprolol, lisinopril, HCTZ    Bilateral hearing loss  Assessment & Plan  · With history, pt asking to see someone for it  · Per chart review ENT apt was set up and labeled as a no-show  · Father bedside aiding in translation due to [facility]  · Will need to re-attempt outpatient follow up with ENT    Chronic low back pain  Assessment & Plan  · Pt with chronic low back pain for approximately 4 years (per father) with difficulty ambulating around the house at times  · Complains of lower back pain with sharp pains that radiate down his back legs  · Pt denies IV drug use, recent falls/back trauma, neck pain/stiffness, or weakness in legs  · W/o red flag symptoms on exam, output adequate per nursing staff  · Xray lumbar spine unremarkable  · Likely neuropathy secondary to diabetes  · Lidocaine patch daily, Scheduled tylenol  · Robaxin and pain management prn  · PT/OT recommending home with home health    Chronic pancreatitis [facility])  Assessment & Plan  · With history, CT abd/pelvis 2/2019 with "chronic calcifications likely related to pancreatitis in addition to pancreatic atrophy   Suspect a dilated pancreatic duct which is unchanged from the prior exam likely from chronic pancreatitis "  · Without fever, leukocytosis, abdominal pain, N/V, decreased appetite  · Lipase WNL however given chronicity may not be acutely elevated  Total bilirubin 1 09 decreased from 2 46 on IVF  · Repeat CMP in AM, serial abdominal exams  · Diet as tolerated      VTE Pharmacologic Prophylaxis:   Moderate Risk (Score 3-4) - Pharmacological DVT Prophylaxis Ordered: enoxaparin (Lovenox)  Patient Centered Rounds: I performed bedside rounds with nursing staff today  Discussions with Specialists or Other Care Team Provider: case management     Education and Discussions with Family / Patient: Patient declined call to   Time Spent for Care: 30 minutes  More than 50% of total time spent on counseling and coordination of care as described above  Current Length of Stay: 2 day(s)  Current Patient Status: Inpatient   Certification Statement: The patient will continue to require additional inpatient hospital stay due to titration of insulin regimen and electryole repletion  Discharge Plan: Anticipate discharge tomorrow to home  Code Status: Level 1 - Full Code    Subjective:   Patient was seen laying in bed today  He reports feeling better today  He reports back pain is mildly improved  He denies any nausea, vomiting, abdominal pain, chest pain, SOB, fever, chills  Objective:     Vitals:   Temp (24hrs), Av 3 °F (36 3 °C), Min:96 5 °F (35 8 °C), Max:98 °F (36 7 °C)    Temp:  [96 5 °F (35 8 °C)-98 °F (36 7 °C)] 97 1 °F (36 2 °C)  HR:  [69-80] 80  Resp:  [16-17] 16  BP: ()/(59-77) 117/77  SpO2:  [96 %-100 %] 97 %  Body mass index is 22 37 kg/m²  Input and Output Summary (last 24 hours): Intake/Output Summary (Last 24 hours) at 2023 1351  Last data filed at 2023 0800  Gross per 24 hour   Intake 120 ml   Output 250 ml   Net -130 ml       Physical Exam:   Physical Exam  Vitals and nursing note reviewed  Constitutional:       Appearance: Normal appearance  HENT:      Head: Normocephalic and atraumatic  Eyes:      General: No scleral icterus    Cardiovascular:      Rate and Rhythm: Normal rate and regular rhythm  Pulmonary:      Effort: Pulmonary effort is normal       Breath sounds: Normal breath sounds  No wheezing  Abdominal:      General: Abdomen is flat  Bowel sounds are normal       Palpations: Abdomen is soft  Tenderness: There is no abdominal tenderness  Musculoskeletal:      Right lower leg: No edema  Left lower leg: No edema  Skin:     General: Skin is warm and dry  Neurological:      Mental Status: He is alert  Mental status is at baseline  Psychiatric:         Mood and Affect: Mood normal          Behavior: Behavior normal          Additional Data:     Labs:  Results from last 7 days   Lab Units 02/02/23  0447 02/01/23  0424 01/31/23  0114   WBC Thousand/uL 8 26   < > 7 94   HEMOGLOBIN g/dL 11 0*   < > 12 1   HEMATOCRIT % 31 8*   < > 33 2*   PLATELETS Thousands/uL 154   < > 166   NEUTROS PCT %  --   --  61   LYMPHS PCT %  --   --  26   MONOS PCT %  --   --  10   EOS PCT %  --   --  2    < > = values in this interval not displayed       Results from last 7 days   Lab Units 02/02/23 0447 02/01/23  0424   SODIUM mmol/L 130* 131*   POTASSIUM mmol/L 4 2 3 8   CHLORIDE mmol/L 101 100   CO2 mmol/L 23 26   BUN mg/dL 32* 28*   CREATININE mg/dL 1 41* 1 40*   ANION GAP mmol/L 6 5   CALCIUM mg/dL 8 7 8 3*   ALBUMIN g/dL  --  3 3*   TOTAL BILIRUBIN mg/dL  --  1 09*   ALK PHOS U/L  --  55   ALT U/L  --  19   AST U/L  --  17   GLUCOSE RANDOM mg/dL 202* 220*     Results from last 7 days   Lab Units 01/29/23  0727   INR  0 96     Results from last 7 days   Lab Units 02/02/23  1104 02/02/23  0613 02/01/23 2054 02/01/23  1520 02/01/23  1055 02/01/23  0628 01/31/23  1534 01/31/23  1107 01/31/23  0632 01/30/23  2039 01/30/23  1425 01/30/23  1205   POC GLUCOSE mg/dl 137 301* 196* 151* 235* 226* 202* 284* 316* 274* 286* 198*     Results from last 7 days   Lab Units 01/30/23  0430   HEMOGLOBIN A1C % 10 2*           Lines/Drains:  Invasive Devices     Peripheral Intravenous Line  Duration           Peripheral IV 02/02/23 Proximal;Right;Ventral (anterior) Forearm <1 day                      Imaging: No pertinent imaging reviewed  Recent Cultures (last 7 days):         Last 24 Hours Medication List:   Current Facility-Administered Medications   Medication Dose Route Frequency Provider Last Rate   • acetaminophen  650 mg Oral Q6H [address] Alex Chinchilla PA-C     • chlordiazePOXIDE  25 mg Oral Q12H Carol Mccollum PA-C     • enoxaparin  40 mg Subcutaneous Daily Jimena Dias MD     • folic acid  1 mg Oral Daily Jimena Dias MD     • hydrALAZINE  5 mg Intravenous Q6H PRN Jimena Dias MD     • lisinopril  40 mg Oral Daily Jimena Dias MD      And   • hydrochlorothiazide  25 mg Oral Daily Jimena Dias MD     • insulin glargine  20 Units Subcutaneous HS Brigitte Faulkner PA-C     • insulin lispro  1-5 Units Subcutaneous TID AC Jimena Dias MD     • insulin lispro  5 Units Subcutaneous TID With Meals Venkat Singh MD     • lidocaine  1 patch Topical Daily Jean Carvajal PA-C     • methocarbamol  500 mg Oral Q6H PRN Jean Carvajal PA-C     • metoprolol tartrate  12 5 mg Oral BID Jimena Dias MD     • multivitamin-minerals  1 tablet Oral Daily Jimena Dias MD     • ondansetron  4 mg Intravenous Q6H PRN Jimena Dias MD     • sodium chloride  50 mL/hr Intravenous Continuous Brigitte Faulkner PA-C 50 mL/hr (02/02/23 0750)   • thiamine  100 mg Oral Daily Jimena Dias MD     • traMADol  50 mg Oral Q6H PRN Taylor Shafer PA-C          Today, Patient Was Seen By: Brigitte Faulkner PA-C    **Please Note: This note may have been constructed using a voice recognition system  **

## 2023-02-02 NOTE — ASSESSMENT & PLAN NOTE
· 31-year-old male with past medical history of alcohol abuse, type 2 diabetes, paroxysmal A  fib presented with increased lethargy, shakiness concerning for withdrawals, found to be hypoglycemic with blood sugars in the 60s  · Patient has frequent hospitalizations for hypoglycemia and intoxication  He does not follow with a PCP education compliance is unclear    Patient was agreeable to changes in insulin regimen today  · Monitor blood work for electrolyte abnormalities  · Accu-Checks, see plan below under diabetes  · Appreciate further endocrinology input

## 2023-02-03 VITALS
SYSTOLIC BLOOD PRESSURE: 140 MMHG | HEART RATE: 71 BPM | WEIGHT: 151.46 LBS | DIASTOLIC BLOOD PRESSURE: 79 MMHG | RESPIRATION RATE: 19 BRPM | BODY MASS INDEX: 22.43 KG/M2 | HEIGHT: 69 IN | OXYGEN SATURATION: 100 % | TEMPERATURE: 97.5 F

## 2023-02-03 LAB
ANION GAP SERPL CALCULATED.3IONS-SCNC: 7 MMOL/L (ref 4–13)
BUN SERPL-MCNC: 31 MG/DL (ref 5–25)
CALCIUM SERPL-MCNC: 8.5 MG/DL (ref 8.4–10.2)
CHLORIDE SERPL-SCNC: 106 MMOL/L (ref 96–108)
CO2 SERPL-SCNC: 20 MMOL/L (ref 21–32)
CREAT SERPL-MCNC: 1.55 MG/DL (ref 0.6–1.3)
ERYTHROCYTE [DISTWIDTH] IN BLOOD BY AUTOMATED COUNT: 11.9 % (ref 11.6–15.1)
GFR SERPL CREATININE-BSD FRML MDRD: 50 ML/MIN/1.73SQ M
GLUCOSE SERPL-MCNC: 192 MG/DL (ref 65–140)
GLUCOSE SERPL-MCNC: 217 MG/DL (ref 65–140)
HCT VFR BLD AUTO: 31.3 % (ref 36.5–49.3)
HGB BLD-MCNC: 10.9 G/DL (ref 12–17)
MAGNESIUM SERPL-MCNC: 2.1 MG/DL (ref 1.9–2.7)
MCH RBC QN AUTO: 32.8 PG (ref 26.8–34.3)
MCHC RBC AUTO-ENTMCNC: 34.8 G/DL (ref 31.4–37.4)
MCV RBC AUTO: 94 FL (ref 82–98)
PLATELET # BLD AUTO: 149 THOUSANDS/UL (ref 149–390)
PMV BLD AUTO: 12.6 FL (ref 8.9–12.7)
POTASSIUM SERPL-SCNC: 4.2 MMOL/L (ref 3.5–5.3)
RBC # BLD AUTO: 3.32 MILLION/UL (ref 3.88–5.62)
SODIUM SERPL-SCNC: 133 MMOL/L (ref 135–147)
WBC # BLD AUTO: 7.7 THOUSAND/UL (ref 4.31–10.16)

## 2023-02-03 RX ORDER — INSULIN LISPRO 100 [IU]/ML
5 INJECTION, SOLUTION INTRAVENOUS; SUBCUTANEOUS
Qty: 4.5 ML | Refills: 0 | Status: SHIPPED | OUTPATIENT
Start: 2023-02-03 | End: 2023-03-05

## 2023-02-03 RX ORDER — FOLIC ACID 1 MG/1
1 TABLET ORAL DAILY
Qty: 30 TABLET | Refills: 0 | Status: SHIPPED | OUTPATIENT
Start: 2023-02-03 | End: 2023-03-05

## 2023-02-03 RX ORDER — BLOOD-GLUCOSE METER
KIT MISCELLANEOUS
Qty: 1 KIT | Refills: 0 | Status: SHIPPED | OUTPATIENT
Start: 2023-02-03

## 2023-02-03 RX ORDER — PEN NEEDLE, DIABETIC 32GX 5/32"
NEEDLE, DISPOSABLE MISCELLANEOUS
Qty: 100 EACH | Refills: 0 | Status: SHIPPED | OUTPATIENT
Start: 2023-02-03

## 2023-02-03 RX ORDER — BLOOD SUGAR DIAGNOSTIC
STRIP MISCELLANEOUS
Qty: 100 EACH | Refills: 0 | Status: SHIPPED | OUTPATIENT
Start: 2023-02-03

## 2023-02-03 RX ORDER — INSULIN GLARGINE 100 [IU]/ML
20 INJECTION, SOLUTION SUBCUTANEOUS
Qty: 6 ML | Refills: 0 | Status: SHIPPED | OUTPATIENT
Start: 2023-02-03 | End: 2023-03-05

## 2023-02-03 RX ORDER — LIDOCAINE 50 MG/G
1 PATCH TOPICAL DAILY
Qty: 30 PATCH | Refills: 0 | Status: SHIPPED | OUTPATIENT
Start: 2023-02-03 | End: 2023-03-05

## 2023-02-03 RX ORDER — GLUCOSAMINE HCL/CHONDROITIN SU 500-400 MG
CAPSULE ORAL
Qty: 100 EACH | Refills: 0 | Status: SHIPPED | OUTPATIENT
Start: 2023-02-03

## 2023-02-03 RX ORDER — LANCETS 33 GAUGE
EACH MISCELLANEOUS
Qty: 100 EACH | Refills: 0 | Status: SHIPPED | OUTPATIENT
Start: 2023-02-03

## 2023-02-03 RX ADMIN — SODIUM CHLORIDE 50 ML/HR: 0.9 INJECTION, SOLUTION INTRAVENOUS at 05:22

## 2023-02-03 RX ADMIN — HYDROCHLOROTHIAZIDE 25 MG: 25 TABLET ORAL at 08:50

## 2023-02-03 RX ADMIN — LISINOPRIL 40 MG: 20 TABLET ORAL at 08:50

## 2023-02-03 RX ADMIN — ACETAMINOPHEN 325MG 650 MG: 325 TABLET ORAL at 05:22

## 2023-02-03 RX ADMIN — ENOXAPARIN SODIUM 40 MG: 40 INJECTION SUBCUTANEOUS at 08:50

## 2023-02-03 RX ADMIN — CHLORDIAZEPOXIDE HYDROCHLORIDE 25 MG: 25 CAPSULE ORAL at 05:22

## 2023-02-03 RX ADMIN — Medication 12.5 MG: at 08:50

## 2023-02-03 RX ADMIN — LIDOCAINE 1 PATCH: 50 PATCH TOPICAL at 08:50

## 2023-02-03 RX ADMIN — INSULIN LISPRO 5 UNITS: 100 INJECTION, SOLUTION INTRAVENOUS; SUBCUTANEOUS at 08:51

## 2023-02-03 RX ADMIN — THIAMINE HCL TAB 100 MG 100 MG: 100 TAB at 08:51

## 2023-02-03 RX ADMIN — MULTIPLE VITAMINS W/ MINERALS TAB 1 TABLET: TAB ORAL at 08:50

## 2023-02-03 RX ADMIN — FOLIC ACID 1 MG: 1 TABLET ORAL at 08:50

## 2023-02-03 RX ADMIN — INSULIN LISPRO 1 UNITS: 100 INJECTION, SOLUTION INTRAVENOUS; SUBCUTANEOUS at 08:50

## 2023-02-03 NOTE — ASSESSMENT & PLAN NOTE
· Pt with chronic low back pain for approximately 4 years (per father) with difficulty ambulating around the house at times  · Complains of lower back pain with sharp pains that radiate down his back legs  · Pt denies IV drug use, recent falls/back trauma, neck pain/stiffness, or weakness in legs  · W/o red flag symptoms on exam, output adequate per nursing staff  · Xray lumbar spine unremarkable  · Likely neuropathy secondary to diabetes  · Continue lidocaine patch daily and tylenol as needed  · PT/OT recommending home with home health

## 2023-02-03 NOTE — ASSESSMENT & PLAN NOTE
· 66-year-old male with past medical history of alcohol abuse, type 2 diabetes, paroxysmal A  fib presented with increased lethargy, shakiness concerning for withdrawals, found to be hypoglycemic with blood sugars in the 60s  · Patient has frequent hospitalizations for hypoglycemia and intoxication  He does not follow with a PCP medication compliance is unclear    Patient was agreeable to changes in insulin regimen today  · Repeat BMP to continue to monitor for electroyle abnormalities  · Endocrinology was consulted appreciate recommendations   · Referral placed to PCP at discharge for continued follow up outpatient

## 2023-02-03 NOTE — PLAN OF CARE
Problem: METABOLIC, FLUID AND ELECTROLYTES - ADULT  Goal: Electrolytes maintained within normal limits  Description: INTERVENTIONS:  - Monitor labs and assess patient for signs and symptoms of electrolyte imbalances  - Administer electrolyte replacement as ordered  - Monitor response to electrolyte replacements, including repeat lab results as appropriate  - Instruct patient on fluid and nutrition as appropriate  Outcome: Not Progressing  Goal: Fluid balance maintained  Description: INTERVENTIONS:  - Monitor labs   - Monitor I/O and WT  - Instruct patient on fluid and nutrition as appropriate  - Assess for signs & symptoms of volume excess or deficit  Outcome: Progressing  Goal: Glucose maintained within target range  Description: INTERVENTIONS:  - Monitor Blood Glucose as ordered  - Assess for signs and symptoms of hyperglycemia and hypoglycemia  - Administer ordered medications to maintain glucose within target range  - Assess nutritional intake and initiate nutrition service referral as needed  Outcome: Progressing     Problem: Nutrition/Hydration-ADULT  Goal: Nutrient/Hydration intake appropriate for improving, restoring or maintaining nutritional needs  Description: Monitor and assess patient's nutrition/hydration status for malnutrition  Collaborate with interdisciplinary team and initiate plan and interventions as ordered  Monitor patient's weight and dietary intake as ordered or per policy  Utilize nutrition screening tool and intervene as necessary  Determine patient's food preferences and provide high-protein, high-caloric foods as appropriate       INTERVENTIONS:  - Monitor oral intake, urinary output, labs, and treatment plans  - Assess nutrition and hydration status and recommend course of action  - Evaluate amount of meals eaten  - Assist patient with eating if necessary   - Allow adequate time for meals  - Recommend/ encourage appropriate diets, oral nutritional supplements, and vitamin/mineral supplements  - Order, calculate, and assess calorie counts as needed  - Recommend, monitor, and adjust tube feedings and TPN/PPN based on assessed needs  - Assess need for intravenous fluids  - Provide specific nutrition/hydration education as appropriate  - Include patient/family/caregiver in decisions related to nutrition  Outcome: Progressing     Problem: COPING  Goal: Pt/Family able to verbalize concerns and demonstrate effective coping strategies  Description: INTERVENTIONS:  - Assist patient/family to identify coping skills, available support systems and cultural and spiritual values  - Provide emotional support, including active listening and acknowledgement of concerns of patient and caregivers  - Reduce environmental stimuli, as able  - Provide patient education  - Assess for spiritual pain/suffering and initiate spiritual care, including notification of [facility] Care or bjorn based community as needed  - Assess effectiveness of coping strategies  Outcome: Progressing  Goal: Will report anxiety at manageable levels  Description: INTERVENTIONS:  - Administer medication as ordered  - Teach and encourage coping skills  - Provide emotional support  - Assess patient/family for anxiety and ability to cope  Outcome: Progressing     Problem: DECISION MAKING  Goal: Pt/Family able to effectively weigh alternatives and participate in decision making related to treatment and care  Description: INTERVENTIONS:  - Identify decision maker  - Determine when there are differences among patient's view, family's view, and healthcare provider's view of patient condition and care goals  - Facilitate patient/family articulation of goals for care  - Help patient/family identify pros/cons of alternative solutions  - Provide information as requested by patient/family  - Respect patient/family rights related to privacy and sharing information   - Serve as a liaison between patient, family and health care team  - Initiate consults as appropriate (Ethics Team, Palliative Care, Family Care Conference, etc )  Outcome: Progressing     Problem: SUBSTANCE USE/ABUSE  Goal: Will have no detox symptoms and will verbalize plan for changing substance-related behavior  Description: INTERVENTIONS:  - Monitor physical status and assess for symptoms of withdrawal  - Administer medication as ordered  - Provide emotional support with 1 on 1 interaction with staff  - Encourage recovery focused program/ addiction education  - Assess for verbalization of changing behaviors related to substance abuse  - Initiate consults and referrals as appropriate (Case Management, Spiritual Care, etc )  Outcome: Progressing

## 2023-02-03 NOTE — DISCHARGE SUMMARY
Martha 48  Discharge- [address] [facility] 1970, 46 y o  male MRN: [id number]  Unit/Bed#: E2 -01 Encounter: [id number]  Primary Care Provider: No primary care provider on file  Date and time admitted to hospital: 1/29/2023  6:55 AM    * Hypoglycemia  Assessment & Plan  · 27-year-old male with past medical history of alcohol abuse, type 2 diabetes, paroxysmal A  fib presented with increased lethargy, shakiness concerning for withdrawals, found to be hypoglycemic with blood sugars in the 60s  · Patient has frequent hospitalizations for hypoglycemia and intoxication  He does not follow with a PCP medication compliance is unclear  Patient was agreeable to changes in insulin regimen today  · Repeat BMP to continue to monitor for electroyle abnormalities  · Endocrinology was consulted appreciate recommendations   · Referral placed to PCP at discharge for continued follow up outpatient    [facility]  Assessment & Plan  · Patient with chronic history of alcohol dependency and abuse, last drink was reportedly PTA  · Alcohol level on admission negative  · Continue thiamine, multivitamins and folate at discharge   · CIWA protocol  · Librium 25 mg q 12 hrs rapid taper will discontinue at discharge  · Discussed with father bedside who reports patient will drink a handle of liquor sometimes daily for a few weeks in a row  Pt reports he has quit in the past and can do it again says he will likely stop  Denies being dependent on alcohol  Reviewed PCP notes from last visit in 2020 where similar conversation occurred  Offered services and patient declined bedside  Discussed with CM    Type 2 diabetes mellitus with stage 3 chronic kidney disease, with long-term current use of insulin [facility])  Assessment & Plan  Lab Results   Component Value Date    HGBA1C 10 2 (H) 01/30/2023     · Patient presented with hypoglycemia   Per chart review he reports checking blood sugar every 2-3 days as well as insulin 26 units every day  · Per chart review, office visit in 8/2020 displays 26 units Lantus  · A1c 10 2   · Continue Lantus to 20 units and Humalog 5 units TID at discharge medications sent to pharmacy  · Continue to monitor blood sugar 3 times at day at discharge   · Refills of supplies sent to pharmacy patient unsure what he has at home  · Endocrinology consulted appreciate recommendations    History of atrial fibrillation  Assessment & Plan  · History of paroxysmal A  fib, not on anticoagulation due to low Kimberly Vascor  · Continue metoprolol 12 5 twice daily    Hyponatremia  Assessment & Plan  · Sodium of 133 on AM labs  · Encourage oral intake   · Repeat bmp in one week    Essential hypertension  Assessment & Plan  · Blood pressure initially hypertensive, improved to SBP   · Continue metoprolol, lisinopril, HCTZ    Bilateral hearing loss  Assessment & Plan  · With history, pt asking to see someone for it  · Per chart review ENT apt was set up and labeled as a no-show  · Will need to re-attempt outpatient follow up with ENT, referral placed    Chronic low back pain  Assessment & Plan  · Pt with chronic low back pain for approximately 4 years (per father) with difficulty ambulating around the house at times  · Complains of lower back pain with sharp pains that radiate down his back legs  · Pt denies IV drug use, recent falls/back trauma, neck pain/stiffness, or weakness in legs  · W/o red flag symptoms on exam, output adequate per nursing staff  · Xray lumbar spine unremarkable  · Likely neuropathy secondary to diabetes  · Continue lidocaine patch daily and tylenol as needed  · PT/OT recommending home with home health    Chronic pancreatitis [facility])  Assessment & Plan  · With history, CT abd/pelvis 2/2019 with "chronic calcifications likely related to pancreatitis in addition to pancreatic atrophy   Suspect a dilated pancreatic duct which is unchanged from the prior exam likely from chronic pancreatitis "  · Without fever, leukocytosis, abdominal pain, N/V, decreased appetite  · Lipase WNL however given chronicity may not be acutely elevated  Total bilirubin 1 09 decreased from 2 46 on IVF  · Repeat CMP in AM, serial abdominal exams  · Diet as tolerated      Medical Problems     Resolved Problems  Date Reviewed: 2/3/2023          Resolved    Hypokalemia 2/1/2023     Resolved by  Shayna Whitmore PA-C        Discharging Physician / Practitioner: Shayna Whitmore PA-C  PCP: No primary care provider on file  Admission Date:   Admission Orders (From admission, onward)     Ordered        01/31/23 1109  Inpatient Admission  Once            01/29/23 1012  Place in Observation  Once                      Discharge Date: 02/03/23    Consultations During Hospital Stay:  · Endocrinology     Procedures Performed:   · none    Significant Findings / Test Results:   · Chest x-ray showing no acute cardiopulmonary disease  · Kathia lumbar spine showing unremarkable radiographic appearance of lumbar spine, pancreatic calcifications consistent with sequela of chronic pancreatitis    Incidental Findings:   · None    Test Results Pending at Discharge (will require follow up): · None     Outpatient Tests Requested:  · BMP  Complications: None    Reason for Admission: Hypoglycemia    Hospital Course:   Marzena Carvajal is a 46 y o  male patient who originally presented to the hospital on 1/29/2023 due to lethargy and shakiness concerning for withdrawals  He was found to be hypoglycemic with blood sugar in the 60s  Patient has history of prior admissions due to intoxication and hypoglycemia  He was started on D5 fluids and his potassium was repleted  He was initiated on CIWA protocol and started on Librium, thiamine, multivitamins and folate  Patient was seen by endocrinology and  was started on 10 units of Lantus with Humalog 2 units before meals   This was increased to Lantus 15 units Humalog 5 units with meals  At discharge patient will continue on Lantus 20 units and Humalog 5 units with meals  He will need to monitor his sugars 3 times a day  All supplies for glucose monitoring and refills of medications were sent into pharmacy  Patient does not follow with family doctor  Referral was placed to family practice  Patient will require close follow-up  During visit patient reported bilateral hearing loss  Patient was previously referred to ENT but no showed  We will resend referral to ENT expressed importance of following up with them if patient wishes to have evaluation for hearing aids  Patient reported  back pain during stay  He was started on lidocaine patches with improvement these will be sent to pharmacy  Discussed with patient importance of alcohol cessation  Discussed with patient the importance of monitoring his sugars and taking medications as prescribed and following up with outpatient providers  Explained that taking medications incorrectly or failing to follow-up could lead to patient having repeat episodes of hypoglycemia which can be very dangerous especially if he continues to drink  Repeat BMP sent with patient to monitor for electrolyte abnormalities in 1 week  Patient is high risk for readmission if he does not follow-up with outpatient providers, and use alcohol usage and taking insulin incorrectly  Please see above list of diagnoses and related plan for additional information  Condition at Discharge: stable    Discharge Day Visit / Exam:   Subjective: Patient was seen Apollo Ruiz in bed today with father at bedside  He reports feeling well today  He denies any acute complaints at this time    Vitals: Blood Pressure: 140/79 (02/03/23 0726)  Pulse: 71 (02/03/23 0726)  Temperature: 97 5 °F (36 4 °C) (02/03/23 0726)  Temp Source: Temporal (02/03/23 0726)  Respirations: 19 (02/03/23 0726)  Height: 5' 9" (175 3 cm) (01/29/23 1420)  Weight - Scale: 68 7 kg (151 lb 7 3 oz) (01/29/23 4209)  SpO2: 100 % (02/03/23 0726)  Exam:   Physical Exam  Vitals and nursing note reviewed  Constitutional:       Appearance: Normal appearance  HENT:      Head: Normocephalic and atraumatic  Eyes:      General: No scleral icterus  Cardiovascular:      Rate and Rhythm: Normal rate and regular rhythm  Pulmonary:      Effort: Pulmonary effort is normal       Breath sounds: Normal breath sounds  No wheezing  Abdominal:      General: Abdomen is flat  Bowel sounds are normal       Palpations: Abdomen is soft  Tenderness: There is no abdominal tenderness  Musculoskeletal:      Right lower leg: No edema  Left lower leg: No edema  Skin:     General: Skin is warm and dry  Neurological:      Mental Status: He is alert  Psychiatric:         Mood and Affect: Mood normal          Behavior: Behavior normal       Discussion with Family: Updated  (father) at bedside  Discharge instructions/Information to patient and family:   See after visit summary for information provided to patient and family  Provisions for Follow-Up Care:  See after visit summary for information related to follow-up care and any pertinent home health orders  Disposition:   Home    Planned Readmission: none     Discharge Statement:  I spent 31 minutes discharging the patient  This time was spent on the day of discharge  I had direct contact with the patient on the day of discharge  Greater than 50% of the total time was spent examining patient, answering all patient questions, arranging and discussing plan of care with patient as well as directly providing post-discharge instructions  Additional time then spent on discharge activities  Discharge Medications:  See after visit summary for reconciled discharge medications provided to patient and/or family        **Please Note: This note may have been constructed using a voice recognition system**

## 2023-02-03 NOTE — ASSESSMENT & PLAN NOTE
· With history, pt asking to see someone for it  · Per chart review ENT apt was set up and labeled as a no-show  · Will need to re-attempt outpatient follow up with ENT, referral placed

## 2023-02-03 NOTE — ASSESSMENT & PLAN NOTE
Lab Results   Component Value Date    HGBA1C 10 2 (H) 01/30/2023     · Patient presented with hypoglycemia  Per chart review he reports checking blood sugar every 2-3 days as well as insulin 26 units every day    · Per chart review, office visit in 8/2020 displays 26 units Lantus  · A1c 10 2   · Continue Lantus to 20 units and Humalog 5 units TID at discharge medications sent to pharmacy  · Continue to monitor blood sugar 3 times at day at discharge   · Refills of supplies sent to pharmacy patient unsure what he has at home  · Endocrinology consulted appreciate recommendations

## 2023-02-06 NOTE — UTILIZATION REVIEW
NOTIFICATION OF ADMISSION DISCHARGE   This is a Notification of Discharge from [facility]  Please be advised that this patient has been discharge from our facility  Below you will find the admission and discharge date and time including the patient’s disposition  UTILIZATION REVIEW CONTACT:  Yancy Barton  Utilization   Network Utilization Review Department  Phone: [phone] x carefully listen to the prompts  All voicemails are confidential   Email: [email or web address]     ADMISSION INFORMATION  PRESENTATION DATE: 1/29/2023  6:55 AM  OBERVATION ADMISSION DATE:  INPATIENT ADMISSION DATE: 1/31/23 11:09 AM   DISCHARGE DATE: 2/3/2023 10:47 AM   DISPOSITION:Home/Self Care    IMPORTANT INFORMATION:  Send all requests for admission clinical reviews, approved or denied determinations and any other requests to dedicated fax number below belonging to the campus where the patient is receiving treatment   List of dedicated fax numbers:  [address] DENIALS (Administrative/Medical Necessity) [phone]   [address] (Maternity/NICU/Pediatrics) [phone]   [facility] [phone]   [place] [address] [phone]   [address] [phone]   220 [facility] [phone]   90 [facility] [phone]   [address] [place], [place] [address] [address] [phone]   [facility]  [phone]   [address] [facility] [phone]   412 [facility] 850 [facility] [phone]

## 2023-02-10 ENCOUNTER — HOSPITAL ENCOUNTER (EMERGENCY)
Facility: HOSPITAL | Age: 53
Discharge: HOME | End: 2023-02-10
Attending: EMERGENCY MEDICINE

## 2023-02-10 ENCOUNTER — APPOINTMENT (EMERGENCY)
Dept: CT IMAGING | Facility: HOSPITAL | Age: 53
End: 2023-02-10

## 2023-02-10 VITALS
RESPIRATION RATE: 18 BRPM | DIASTOLIC BLOOD PRESSURE: 100 MMHG | WEIGHT: 149.25 LBS | BODY MASS INDEX: 22.04 KG/M2 | OXYGEN SATURATION: 100 % | SYSTOLIC BLOOD PRESSURE: 205 MMHG | TEMPERATURE: 95.8 F | HEART RATE: 88 BPM

## 2023-02-10 DIAGNOSIS — E16.2 HYPOGLYCEMIA: Primary | ICD-10-CM

## 2023-02-10 DIAGNOSIS — T68.XXXA HYPOTHERMIA, INITIAL ENCOUNTER: ICD-10-CM

## 2023-02-10 LAB
ALBUMIN SERPL BCP-MCNC: 3.3 G/DL (ref 3.5–5)
ALP SERPL-CCNC: 41 U/L (ref 34–104)
ALT SERPL W P-5'-P-CCNC: 21 U/L (ref 7–52)
ANION GAP SERPL CALCULATED.3IONS-SCNC: 5 MMOL/L (ref 4–13)
APTT PPP: 27 SECONDS (ref 23–37)
AST SERPL W P-5'-P-CCNC: 17 U/L (ref 13–39)
BASE EX.OXY STD BLDV CALC-SCNC: 75.7 % (ref 60–80)
BASE EXCESS BLDV CALC-SCNC: -3.4 MMOL/L
BASOPHILS # BLD AUTO: 0.02 THOUSANDS/ÂΜL (ref 0–0.1)
BASOPHILS NFR BLD AUTO: 0 % (ref 0–1)
BILIRUB SERPL-MCNC: 0.35 MG/DL (ref 0.2–1)
BUN SERPL-MCNC: 12 MG/DL (ref 5–25)
CALCIUM ALBUM COR SERPL-MCNC: 8.3 MG/DL (ref 8.3–10.1)
CALCIUM SERPL-MCNC: 7.7 MG/DL (ref 8.4–10.2)
CARDIAC TROPONIN I PNL SERPL HS: 5 NG/L
CHLORIDE SERPL-SCNC: 108 MMOL/L (ref 96–108)
CK SERPL-CCNC: 38 U/L (ref 39–308)
CO2 SERPL-SCNC: 24 MMOL/L (ref 21–32)
CREAT SERPL-MCNC: 1.02 MG/DL (ref 0.6–1.3)
EOSINOPHIL # BLD AUTO: 0.01 THOUSAND/ÂΜL (ref 0–0.61)
EOSINOPHIL NFR BLD AUTO: 0 % (ref 0–6)
ERYTHROCYTE [DISTWIDTH] IN BLOOD BY AUTOMATED COUNT: 12.3 % (ref 11.6–15.1)
ETHANOL SERPL-MCNC: <10 MG/DL
GFR SERPL CREATININE-BSD FRML MDRD: 84 ML/MIN/1.73SQ M
GLUCOSE SERPL-MCNC: 124 MG/DL (ref 65–140)
GLUCOSE SERPL-MCNC: 231 MG/DL (ref 65–140)
HCO3 BLDV-SCNC: 23.8 MMOL/L (ref 24–30)
HCT VFR BLD AUTO: 29.3 % (ref 36.5–49.3)
HGB BLD-MCNC: 10.4 G/DL (ref 12–17)
IMM GRANULOCYTES # BLD AUTO: 0.03 THOUSAND/UL (ref 0–0.2)
IMM GRANULOCYTES NFR BLD AUTO: 0 % (ref 0–2)
INR PPP: 1.09 (ref 0.84–1.19)
LACTATE SERPL-SCNC: 1.8 MMOL/L (ref 0.5–2)
LYMPHOCYTES # BLD AUTO: 0.82 THOUSANDS/ÂΜL (ref 0.6–4.47)
LYMPHOCYTES NFR BLD AUTO: 9 % (ref 14–44)
MAGNESIUM SERPL-MCNC: 1.4 MG/DL (ref 1.9–2.7)
MCH RBC QN AUTO: 33 PG (ref 26.8–34.3)
MCHC RBC AUTO-ENTMCNC: 35.5 G/DL (ref 31.4–37.4)
MCV RBC AUTO: 93 FL (ref 82–98)
MONOCYTES # BLD AUTO: 0.48 THOUSAND/ÂΜL (ref 0.17–1.22)
MONOCYTES NFR BLD AUTO: 5 % (ref 4–12)
NEUTROPHILS # BLD AUTO: 7.51 THOUSANDS/ÂΜL (ref 1.85–7.62)
NEUTS SEG NFR BLD AUTO: 86 % (ref 43–75)
NRBC BLD AUTO-RTO: 0 /100 WBCS
O2 CT BLDV-SCNC: 12.2 ML/DL
PCO2 BLDV: 52.1 MM HG (ref 42–50)
PH BLDV: 7.28 [PH] (ref 7.3–7.4)
PHOSPHATE SERPL-MCNC: 3.7 MG/DL (ref 2.7–4.5)
PLATELET # BLD AUTO: 265 THOUSANDS/UL (ref 149–390)
PMV BLD AUTO: 10.1 FL (ref 8.9–12.7)
PO2 BLDV: 41.5 MM HG (ref 35–45)
POTASSIUM SERPL-SCNC: 3.3 MMOL/L (ref 3.5–5.3)
PROCALCITONIN SERPL-MCNC: <0.05 NG/ML
PROT SERPL-MCNC: 5.6 G/DL (ref 6.4–8.4)
PROTHROMBIN TIME: 14.1 SECONDS (ref 11.6–14.5)
RBC # BLD AUTO: 3.15 MILLION/UL (ref 3.88–5.62)
SODIUM SERPL-SCNC: 137 MMOL/L (ref 135–147)
TSH SERPL DL<=0.05 MIU/L-ACNC: 1.2 UIU/ML (ref 0.45–4.5)
WBC # BLD AUTO: 8.87 THOUSAND/UL (ref 4.31–10.16)

## 2023-02-10 RX ORDER — LANOLIN ALCOHOL/MO/W.PET/CERES
100 CREAM (GRAM) TOPICAL ONCE
Status: COMPLETED | OUTPATIENT
Start: 2023-02-10 | End: 2023-02-10

## 2023-02-10 RX ORDER — FOLIC ACID 1 MG/1
1 TABLET ORAL ONCE
Status: COMPLETED | OUTPATIENT
Start: 2023-02-10 | End: 2023-02-10

## 2023-02-10 RX ADMIN — THIAMINE HCL TAB 100 MG 100 MG: 100 TAB at 15:10

## 2023-02-10 RX ADMIN — FOLIC ACID 1 MG: 1 TABLET ORAL at 15:10

## 2023-02-10 NOTE — ED NOTES
This RN entered patients room to obtain ECG  Patient reports he needs to go home  He does not want anymore tests  He requested paperwork to sign himself out of the hospital so that he can go home  ED provider made aware        Claudia Fu RN  02/10/23 2819

## 2023-02-10 NOTE — ED ATTENDING ATTESTATION
2/10/2023  I, Charlann Moritz, DO, saw and evaluated the patient  I have discussed the patient with the resident/non-physician practitioner and agree with the resident's/non-physician practitioner's findings, Plan of Care, and MDM as documented in the resident's/non-physician practitioner's note, except where noted  All available labs and Radiology studies were reviewed  I was present for key portions of any procedure(s) performed by the resident/non-physician practitioner and I was immediately available to provide assistance  At this point I agree with the current assessment done in the Emergency Department  I have conducted an independent evaluation of this patient a history and physical is as follows:    45 yo M h/o IDDM, alcohol abuse; presenting for evaluation of hypoglycemia  Macedonian speaking,  used to obtain history and history via EMS  Pt states drinking alcohol yesterday/last night and woke up today when EMS were at his home  Parents called 9-1-1 for AMS/decreased mentation  Pt found to be hypoglycemic, was given 250 ml D10 and he ate juice/bread  He cannot say when/if he took his insulin/meds or ate yesterday/today  He denies any falls/head injury   Denies somatic complaints/recent illness/etc     Glucose 129 on arrival  Found to be hypothermic to 92 4F- micheline hugger placed    Plan: Closely monitor glucose/replace/eat as needed, labs to assess for metabolic derangement, CK to r/o rhabdo, TSH to r/o hypothyroidism, CTH to r/o intracranial bleed, dispo pending workup          ED Course  ED Course as of 02/10/23 1805   Fri Feb 10, 2023   1622 Pt refusing [facility] and requesting to leave         Critical Care Time  Procedures

## 2023-02-10 NOTE — DISCHARGE INSTRUCTIONS
We recommended CT scan for possible head injury and altered mental status which you refused   Please return to the ED if symptom worsens

## 2023-02-10 NOTE — ED PROVIDER NOTES
History  Chief Complaint   Patient presents with   • Hypoglycemia - Symptomatic     Per EMS mother called 911 pt found to be diaphoretic and wouldn't respond to EMS commend initial sugar 40, pt given 250 d10 after able to sit up and drink juice and eat bread sugar went up to 166, was still sluggish slow to respond  Refused to go to hospital apd had to be called pt aggressive toward his parent and non cooperative  Pt shaking from cold denies any symptoms at this time  HPI  Patient is a 61-year-old male with history of alcohol abuse, insulin-dependent diabetes, chronic pancreatitis presenting with altered mental status in the setting of hypoglycemia and hypothermia  Patient states that he was drinking last night and woke up this morning surrounded by EMS  Per EMS patient was hypoglycemic in the 40s and was given 250 of D10 as well as p o  intake of juice and bread with normalization of his sugar  Patient progressively became more with however appeared drowsy still  Patient states that he did not fall and denies any head injury  Denies any pain  Was recently admitted for hypoglycemia and was just discharged a week ago  Patient also denies any use of insulin today  Prior to Admission Medications   Prescriptions Last Dose Informant Patient Reported? Taking? Alcohol Swabs 70 % PADS   No No   Sig: May substitute brand based on insurance coverage  Check glucose TID  Blood Glucose Monitoring Suppl (Accu-Chek Guide) w/Device KIT   No No   Sig: Use 3 (three) times a day   Blood Glucose Monitoring Suppl (OneTouch Verio Reflect) w/Device KIT   No No   Sig: May substitute brand based on insurance coverage  Check glucose TID  Insulin Glargine Solostar (Basaglar KwikPen) 100 UNIT/ML SOPN   No No   Sig: Inject 0 2 mL (20 Units total) under the skin daily at bedtime   Insulin Pen Needle (BD Pen Needle Claudia 2nd Gen) 32G X 4 MM MISC   No No   Sig: For use with insulin pen   Pharmacy may dispense brand covered by insurance  Insulin Pen Needle (BD Pen Needle Claudia 2nd Gen) 32G X 4 MM MISC   No No   Sig: For use with insulin pen  Pharmacy may dispense brand covered by insurance  Insulin Pen Needle (Unifine Pentips) 32G X 4 MM MISC   No No   Sig: Inject as directed daily   Lancets (accu-chek multiclix) lancets   No No   Sig: Check blood sugar 3 times a day   OneTouch Delica Lancets 81F MISC   No No   Sig: May substitute brand based on insurance coverage  Check glucose TID  folic acid (FOLVITE) 1 mg tablet   No No   Sig: Take 1 tablet (1 mg total) by mouth daily   glucose blood (Accu-Chek Guide) test strip   No No   Sig: Check blood sugar 3 times a day   glucose blood (OneTouch Verio) test strip   No No   Sig: May substitute brand based on insurance coverage  Check glucose TID  insulin lispro (HumaLOG KwikPen) 100 units/mL injection pen   No No   Sig: Inject 5 Units under the skin 3 (three) times a day with meals   lidocaine (LIDODERM) 5 %   No No   Sig: Apply 1 patch topically over 12 hours daily Remove & Discard patch within 12 hours or as directed by MD   lisinopril-hydrochlorothiazide (PRINZIDE,ZESTORETIC) 20-12 5 MG per tablet   No No   Sig: Take 2 tablets by mouth daily   metoprolol tartrate (LOPRESSOR) 25 mg tablet   No No   Sig: Take 0 5 tablets (12 5 mg total) by mouth every 12 (twelve) hours   thiamine (VITAMIN B1) 100 mg tablet   No No   Sig: Take 1 tablet (100 mg total) by mouth daily Do not start before January 3, 2023        Facility-Administered Medications: None       Past Medical History:   Diagnosis Date   • Alcohol abuse    • Chronic pancreatitis ([facility] )    • Diabetes mellitus ([facility] )     Type 2   • Pancreatitis    • Paroxysmal A-fib ([facility] )    • Thrombocytopenia ([facility])        Past Surgical History:   Procedure Laterality Date   • INCISION AND DRAINAGE OF WOUND N/A 8/16/2020    Procedure: INCISION AND DRAINAGE (I&D) HEAD/FACE;  Surgeon: Marlyn Infante DMD;  Location: [facility];  Service: Maxillofacial   • IR BIOPSY BONE MARROW  2/7/2019   • REMOVAL OF IMPACTED TOOTH - COMPLETELY BONY N/A 8/16/2020    Procedure: EXTRACTION TEETH MULTIPLE #2, 3;  Surgeon: Tahmina Branham DMD;  Location: [facility];  Service: Maxillofacial       Family History   Problem Relation Age of Onset   • Diabetes Mother    • Hypertension Mother    • Hyperlipidemia Father      I have reviewed and agree with the history as documented  E-Cigarette/Vaping     E-Cigarette/Vaping Substances     Social History     Tobacco Use   • Smoking status: Former   • Smokeless tobacco: Never   Substance Use Topics   • Alcohol use: Yes   • Drug use: Yes     Types: Cocaine        Review of Systems   Constitutional: Negative for chills, diaphoresis, fever and unexpected weight change  HENT: Negative for ear pain and sore throat  Eyes: Negative for visual disturbance  Respiratory: Negative for cough, chest tightness and shortness of breath  Cardiovascular: Negative for chest pain and leg swelling  Gastrointestinal: Negative for abdominal distention, abdominal pain, constipation, diarrhea, nausea and vomiting  Endocrine: Negative  Genitourinary: Negative for difficulty urinating and dysuria  Musculoskeletal: Negative  Skin: Negative  Allergic/Immunologic: Negative  Neurological: Negative  Hematological: Negative  Psychiatric/Behavioral: Negative  All other systems reviewed and are negative        Physical Exam  ED Triage Vitals [02/10/23 1356]   Temperature Pulse Respirations Blood Pressure SpO2   (!) 92 4 °F (33 6 °C) 80 20 (!) 181/88 100 %      Temp Source Heart Rate Source Patient Position - Orthostatic VS BP Location FiO2 (%)   Oral Monitor Sitting Right arm --      Pain Score       No Pain             Orthostatic Vital Signs  Vitals:    02/10/23 1356 02/10/23 1400 02/10/23 1516 02/10/23 1642   BP: (!) 181/88 (!) 186/86 161/80 (!) 205/100   Pulse: 80 91 93 88   Patient Position - Orthostatic VS: Sitting Lying Lying Lying Physical Exam  Vitals and nursing note reviewed  Constitutional:       General: He is not in acute distress  Appearance: Normal appearance  He is not ill-appearing  HENT:      Head: Normocephalic and atraumatic  Right Ear: External ear normal       Left Ear: External ear normal       Nose: Nose normal       Mouth/Throat:      Mouth: Mucous membranes are moist       Pharynx: Oropharynx is clear  Eyes:      General: No scleral icterus  Right eye: No discharge  Left eye: No discharge  Extraocular Movements: Extraocular movements intact  Conjunctiva/sclera: Conjunctivae normal       Pupils: Pupils are equal, round, and reactive to light  Cardiovascular:      Rate and Rhythm: Normal rate and regular rhythm  Pulses: Normal pulses  Heart sounds: Normal heart sounds  Pulmonary:      Effort: Pulmonary effort is normal       Breath sounds: Normal breath sounds  Abdominal:      General: Abdomen is flat  Bowel sounds are normal  There is no distension  Palpations: Abdomen is soft  Tenderness: There is no abdominal tenderness  There is no guarding or rebound  Musculoskeletal:         General: Normal range of motion  Cervical back: Normal range of motion and neck supple  Skin:     General: Skin is warm and dry  Capillary Refill: Capillary refill takes less than 2 seconds  Neurological:      General: No focal deficit present  Mental Status: He is alert and oriented to person, place, and time  Mental status is at baseline  Psychiatric:         Mood and Affect: Mood normal          Behavior: Behavior is slowed  Thought Content:  Thought content normal          Judgment: Judgment normal          ED Medications  Medications   thiamine tablet 100 mg (100 mg Oral Given 6/10/83 0180)   folic acid (FOLVITE) tablet 1 mg (1 mg Oral Given 2/10/23 1510)       Diagnostic Studies  Results Reviewed     Procedure Component Value Units Date/Time TSH, 3rd generation with Free T4 reflex [[id number]]  (Normal) Collected: 02/10/23 1506    Lab Status: Final result Specimen: Blood from Arm, Left Updated: 02/10/23 1611     TSH 3RD GENERATON 1 201 uIU/mL     Narrative:      Patients undergoing fluorescein dye angiography may retain small amounts of fluorescein in the body for 48-72 hours post procedure  Samples containing fluorescein can produce falsely depressed TSH values  If the patient had this procedure,a specimen should be resubmitted post fluorescein clearance  Procalcitonin [[id number]]  (Normal) Collected: 02/10/23 1506    Lab Status: Final result Specimen: Blood from Arm, Left Updated: 02/10/23 1605     Procalcitonin <0 05 ng/ml     HS Troponin 0hr (reflex protocol) [[id number]]  (Normal) Collected: 02/10/23 1506    Lab Status: Final result Specimen: Blood from Arm, Left Updated: 02/10/23 1604     hs TnI 0hr 5 ng/L     Lactic acid [[id number]]  (Normal) Collected: 02/10/23 1506    Lab Status: Final result Specimen: Blood from Arm, Left Updated: 02/10/23 1602     LACTIC ACID 1 8 mmol/L     Narrative:      Result may be elevated if tourniquet was used during collection      CK (with reflex to MB) [[id number]]  (Abnormal) Collected: 02/10/23 1506    Lab Status: Final result Specimen: Blood from Arm, Left Updated: 02/10/23 1602     Total CK 38 U/L     Ethanol [[id number]]  (Normal) Collected: 02/10/23 1512    Lab Status: Final result Specimen: Blood from Arm, Left Updated: 02/10/23 1602     Ethanol Lvl <10 mg/dL     Comprehensive metabolic panel [[id number]]  (Abnormal) Collected: 02/10/23 1506    Lab Status: Final result Specimen: Blood from Arm, Left Updated: 02/10/23 1602     Sodium 137 mmol/L      Potassium 3 3 mmol/L      Chloride 108 mmol/L      CO2 24 mmol/L      ANION GAP 5 mmol/L      BUN 12 mg/dL      Creatinine 1 02 mg/dL      Glucose 231 mg/dL      Calcium 7 7 mg/dL      Corrected Calcium 8 3 mg/dL      AST 17 U/L      ALT 21 U/L      Alkaline Phosphatase 41 U/L      Total Protein 5 6 g/dL      Albumin 3 3 g/dL      Total Bilirubin 0 35 mg/dL      eGFR 84 ml/min/1 73sq m     Narrative:      [place] guidelines for Chronic Kidney Disease (CKD):   •  Stage 1 with normal or high GFR (GFR > 90 mL/min/1 73 square meters)  •  Stage 2 Mild CKD (GFR = 60-89 mL/min/1 73 square meters)  •  Stage 3A Moderate CKD (GFR = 45-59 mL/min/1 73 square meters)  •  Stage 3B Moderate CKD (GFR = 30-44 mL/min/1 73 square meters)  •  Stage 4 Severe CKD (GFR = 15-29 mL/min/1 73 square meters)  •  Stage 5 End Stage CKD (GFR <15 mL/min/1 73 square meters)  Note: GFR calculation is accurate only with a steady state creatinine    Magnesium [[id number]]  (Abnormal) Collected: 02/10/23 1506    Lab Status: Final result Specimen: Blood from Arm, Left Updated: 02/10/23 1601     Magnesium 1 4 mg/dL     Phosphorus [[id number]]  (Normal) Collected: 02/10/23 1506    Lab Status: Final result Specimen: Blood from Arm, Left Updated: 02/10/23 1601     Phosphorus 3 7 mg/dL     Protime-INR [[id number]]  (Normal) Collected: 02/10/23 1506    Lab Status: Final result Specimen: Blood from Arm, Left Updated: 02/10/23 1552     Protime 14 1 seconds      INR 1 09    APTT [[id number]]  (Normal) Collected: 02/10/23 1506    Lab Status: Final result Specimen: Blood from Arm, Left Updated: 02/10/23 1552     PTT 27 seconds     Blood gas, venous [[id number]]  (Abnormal) Collected: 02/10/23 1506    Lab Status: Final result Specimen: Blood from Arm, Left Updated: 02/10/23 1537     pH, Sami 7 277     pCO2, Sami 52 1 mm Hg      pO2, Sami 41 5 mm Hg      HCO3, Sami 23 8 mmol/L      Base Excess, Sami -3 4 mmol/L      O2 Content, Sami 12 2 ml/dL      O2 HGB, VENOUS 75 7 %     CBC and differential [[id number]]  (Abnormal) Collected: 02/10/23 1506    Lab Status: Final result Specimen: Blood from Arm, Left Updated: 02/10/23 1533     WBC 8 87 Thousand/uL      RBC 3 15 Million/uL      Hemoglobin 10 4 g/dL Hematocrit 29 3 %      MCV 93 fL      MCH 33 0 pg      MCHC 35 5 g/dL      RDW 12 3 %      MPV 10 1 fL      Platelets 862 Thousands/uL      nRBC 0 /100 WBCs      Neutrophils Relative 86 %      Immat GRANS % 0 %      Lymphocytes Relative 9 %      Monocytes Relative 5 %      Eosinophils Relative 0 %      Basophils Relative 0 %      Neutrophils Absolute 7 51 Thousands/µL      Immature Grans Absolute 0 03 Thousand/uL      Lymphocytes Absolute 0 82 Thousands/µL      Monocytes Absolute 0 48 Thousand/µL      Eosinophils Absolute 0 01 Thousand/µL      Basophils Absolute 0 02 Thousands/µL     Fingerstick Glucose (POCT) [[id number]]  (Normal) Collected: 02/10/23 1347    Lab Status: Final result Updated: 02/10/23 1348     POC Glucose 124 mg/dl                  No orders to display         Procedures  Procedures      ED Course                             SBIRT 22yo+    Flowsheet Row Most Recent Value   SBIRT (23 yo +)    In order to provide better care to our patients, we are screening all of our patients for alcohol and drug use  Would it be okay to ask you these screening questions? No Filed at: 02/10/2023 36                Medical Decision Making  48year old male with reported AMS along with hypoglycemia and hypothermia  Given glucose and PO intake with improvement   Rewarming started in ED   Patient with improvement in mental status   Possible infection vs alcoholic ketoacidosis vs Head injury vs thyroid etiology   Lab workup to assess for above   Patient mildly acidotic but otherwise markers are not significantly outside of normal range   Patient refused CT head and states that he wants to go home  Despite attempts to keep the patient for further eval patient refuses     Patient competent on exam and risks were discussed and understands   Discharged with return precaution provided     Hypoglycemia: self-limited or minor problem  Hypothermia, initial encounter: self-limited or minor problem  Amount and/or Complexity of Data Reviewed  Labs: ordered  Risk  OTC drugs  Disposition  Final diagnoses:   Hypoglycemia   Hypothermia, initial encounter     Time reflects when diagnosis was documented in both MDM as applicable and the Disposition within this note     Time User Action Codes Description Comment    2/10/2023  4:31 PM Hillary Eng [E16 2] Hypoglycemia     2/10/2023  4:32 PM Chasidy Collier, [address]  XXXA] Hypothermia, initial encounter       ED Disposition     ED Disposition   Discharge    Condition   Stable    Date/Time   Fri Feb 10, 2023  4:31 PM    Comment   Miri Merrill discharge to home/self care  Follow-up Information     Follow up With Specialties Details Why Contact Info Additional [address] [facility] Emergency Department Emergency Medicine Go to  If symptoms worsen [facility] [phone] [address] [facility] Emergency Department, [address], [place], [address]          Discharge Medication List as of 2/10/2023  4:33 PM      CONTINUE these medications which have NOT CHANGED    Details   Alcohol Swabs 70 % PADS May substitute brand based on insurance coverage  Check glucose TID , Normal      !! Blood Glucose Monitoring Suppl (Accu-Chek Guide) w/Device KIT Use 3 (three) times a day, Starting Sun 9/4/2022, Normal      !! Blood Glucose Monitoring Suppl (OneTouch Verio Reflect) w/Device KIT May substitute brand based on insurance coverage  Check glucose TID , Normal      folic acid (FOLVITE) 1 mg tablet Take 1 tablet (1 mg total) by mouth daily, Starting Fri 2/3/2023, Until Sun 3/5/2023, Normal      !! glucose blood (Accu-Chek Guide) test strip Check blood sugar 3 times a day, Normal      !! glucose blood (OneTouch Verio) test strip May substitute brand based on insurance coverage   Check glucose TID , Normal      Insulin Glargine Solostar (Basaglar KwikPen) 100 UNIT/ML SOPN Inject 0 2 mL (20 Units total) under the skin daily at bedtime, Starting Fri 2/3/2023, Until Sun 3/5/2023, Normal      insulin lispro (HumaLOG KwikPen) 100 units/mL injection pen Inject 5 Units under the skin 3 (three) times a day with meals, Starting Fri 2/3/2023, Until Sun 3/5/2023, Normal      !! Insulin Pen Needle (BD Pen Needle Claudia 2nd Gen) 32G X 4 MM MISC For use with insulin pen  Pharmacy may dispense brand covered by insurance , Normal      !! Insulin Pen Needle (BD Pen Needle Claudia 2nd Gen) 32G X 4 MM MISC For use with insulin pen  Pharmacy may dispense brand covered by insurance , Normal      !! Lancets (accu-chek multiclix) lancets Check blood sugar 3 times a day, Normal      lidocaine (LIDODERM) 5 % Apply 1 patch topically over 12 hours daily Remove & Discard patch within 12 hours or as directed by MD, Starting Fri 2/3/2023, Until Sun 3/5/2023, Normal      lisinopril-hydrochlorothiazide (PRINZIDE,ZESTORETIC) 20-12 5 MG per tablet Take 2 tablets by mouth daily, Starting Thu 11/24/2022, Until Sat 12/24/2022, Normal      metoprolol tartrate (LOPRESSOR) 25 mg tablet Take 0 5 tablets (12 5 mg total) by mouth every 12 (twelve) hours, Starting Sat 7/30/2022, Until Mon 8/29/2022, Normal      !! OneTouch Delica Lancets 85I MISC May substitute brand based on insurance coverage  Check glucose TID , Normal      thiamine (VITAMIN B1) 100 mg tablet Take 1 tablet (100 mg total) by mouth daily Do not start before January 3, 2023 , Starting Tue 1/3/2023, Normal       !! - Potential duplicate medications found  Please discuss with provider  No discharge procedures on file  PDMP Review       Value Time User    PDMP Reviewed  Yes 8/18/2020 11:39 AM Pedro Luis Membreno MD           ED Provider  Attending physically available and evaluated [address] I managed the patient along with the ED Attending      Electronically Signed by         Benson Ann MD  02/11/23 4165

## 2023-02-27 ENCOUNTER — VBI (OUTPATIENT)
Dept: ADMINISTRATIVE | Facility: OTHER | Age: 53
End: 2023-02-27

## 2023-10-04 ENCOUNTER — VBI (OUTPATIENT)
Dept: ADMINISTRATIVE | Facility: OTHER | Age: 53
End: 2023-10-04

## 2023-10-05 ENCOUNTER — VBI (OUTPATIENT)
Dept: ADMINISTRATIVE | Facility: OTHER | Age: 53
End: 2023-10-05

## 2023-10-06 ENCOUNTER — VBI (OUTPATIENT)
Dept: ADMINISTRATIVE | Facility: OTHER | Age: 53
End: 2023-10-06

## 2023-10-17 ENCOUNTER — HOSPITAL ENCOUNTER (EMERGENCY)
Facility: HOSPITAL | Age: 53
Discharge: HOME | End: 2023-10-17
Attending: EMERGENCY MEDICINE
Payer: COMMERCIAL

## 2023-10-17 VITALS
OXYGEN SATURATION: 99 % | TEMPERATURE: 97.9 F | WEIGHT: 136.69 LBS | DIASTOLIC BLOOD PRESSURE: 68 MMHG | BODY MASS INDEX: 20.18 KG/M2 | SYSTOLIC BLOOD PRESSURE: 115 MMHG | RESPIRATION RATE: 18 BRPM | HEART RATE: 79 BPM

## 2023-10-17 DIAGNOSIS — Z79.4 TYPE 2 DIABETES MELLITUS WITH HYPERGLYCEMIA, WITH LONG-TERM CURRENT USE OF INSULIN (HCC): ICD-10-CM

## 2023-10-17 DIAGNOSIS — Z79.4 TYPE 2 DIABETES MELLITUS WITH STAGE 3 CHRONIC KIDNEY DISEASE, WITH LONG-TERM CURRENT USE OF INSULIN, UNSPECIFIED WHETHER STAGE 3A OR 3B CKD (HCC): ICD-10-CM

## 2023-10-17 DIAGNOSIS — Z91.148 NONCOMPLIANCE WITH MEDICATION REGIMEN: ICD-10-CM

## 2023-10-17 DIAGNOSIS — E11.22 TYPE 2 DIABETES MELLITUS WITH STAGE 3 CHRONIC KIDNEY DISEASE, WITH LONG-TERM CURRENT USE OF INSULIN, UNSPECIFIED WHETHER STAGE 3A OR 3B CKD (HCC): ICD-10-CM

## 2023-10-17 DIAGNOSIS — R73.9 HYPERGLYCEMIA: ICD-10-CM

## 2023-10-17 DIAGNOSIS — E11.65 TYPE 2 DIABETES MELLITUS WITH HYPERGLYCEMIA, WITH LONG-TERM CURRENT USE OF INSULIN (HCC): ICD-10-CM

## 2023-10-17 DIAGNOSIS — F10.920 ALCOHOLIC INTOXICATION WITHOUT COMPLICATION (HCC): Primary | ICD-10-CM

## 2023-10-17 DIAGNOSIS — N18.30 TYPE 2 DIABETES MELLITUS WITH STAGE 3 CHRONIC KIDNEY DISEASE, WITH LONG-TERM CURRENT USE OF INSULIN, UNSPECIFIED WHETHER STAGE 3A OR 3B CKD (HCC): ICD-10-CM

## 2023-10-17 LAB
ALBUMIN SERPL BCP-MCNC: 3.7 G/DL (ref 3.5–5)
ALP SERPL-CCNC: 81 U/L (ref 34–104)
ALT SERPL W P-5'-P-CCNC: 29 U/L (ref 7–52)
AMMONIA PLAS-SCNC: 38 UMOL/L (ref 18–72)
ANION GAP SERPL CALCULATED.3IONS-SCNC: 9 MMOL/L
APTT PPP: 25 SECONDS (ref 23–37)
AST SERPL W P-5'-P-CCNC: 24 U/L (ref 13–39)
ATRIAL RATE: 95 BPM
BASE EX.OXY STD BLDV CALC-SCNC: 84.7 % (ref 60–80)
BASE EXCESS BLDV CALC-SCNC: -4 MMOL/L
BASOPHILS # BLD AUTO: 0.02 THOUSANDS/ÂΜL (ref 0–0.1)
BASOPHILS NFR BLD AUTO: 0 % (ref 0–1)
BETA-HYDROXYBUTYRATE: 0.1 MMOL/L
BILIRUB SERPL-MCNC: 0.47 MG/DL (ref 0.2–1)
BUN SERPL-MCNC: 16 MG/DL (ref 5–25)
CALCIUM SERPL-MCNC: 8.3 MG/DL (ref 8.4–10.2)
CHLORIDE SERPL-SCNC: 95 MMOL/L (ref 96–108)
CO2 SERPL-SCNC: 23 MMOL/L (ref 21–32)
CREAT SERPL-MCNC: 1.48 MG/DL (ref 0.6–1.3)
EOSINOPHIL # BLD AUTO: 0.13 THOUSAND/ÂΜL (ref 0–0.61)
EOSINOPHIL NFR BLD AUTO: 1 % (ref 0–6)
ERYTHROCYTE [DISTWIDTH] IN BLOOD BY AUTOMATED COUNT: 10.8 % (ref 11.6–15.1)
ETHANOL SERPL-MCNC: 236 MG/DL
GFR SERPL CREATININE-BSD FRML MDRD: 53 ML/MIN/1.73SQ M
GLUCOSE SERPL-MCNC: 468 MG/DL (ref 65–140)
GLUCOSE SERPL-MCNC: 810 MG/DL (ref 65–140)
GLUCOSE SERPL-MCNC: >500 MG/DL (ref 65–140)
HCO3 BLDV-SCNC: 21.9 MMOL/L (ref 24–30)
HCT VFR BLD AUTO: 31.7 % (ref 36.5–49.3)
HGB BLD-MCNC: 11.2 G/DL (ref 12–17)
IMM GRANULOCYTES # BLD AUTO: 0.03 THOUSAND/UL (ref 0–0.2)
IMM GRANULOCYTES NFR BLD AUTO: 0 % (ref 0–2)
INR PPP: 1.07 (ref 0.84–1.19)
LYMPHOCYTES # BLD AUTO: 2.49 THOUSANDS/ÂΜL (ref 0.6–4.47)
LYMPHOCYTES NFR BLD AUTO: 27 % (ref 14–44)
MCH RBC QN AUTO: 31.4 PG (ref 26.8–34.3)
MCHC RBC AUTO-ENTMCNC: 35.3 G/DL (ref 31.4–37.4)
MCV RBC AUTO: 89 FL (ref 82–98)
MONOCYTES # BLD AUTO: 0.67 THOUSAND/ÂΜL (ref 0.17–1.22)
MONOCYTES NFR BLD AUTO: 7 % (ref 4–12)
NEUTROPHILS # BLD AUTO: 5.77 THOUSANDS/ÂΜL (ref 1.85–7.62)
NEUTS SEG NFR BLD AUTO: 65 % (ref 43–75)
NRBC BLD AUTO-RTO: 0 /100 WBCS
O2 CT BLDV-SCNC: 14.5 ML/DL
P AXIS: 77 DEGREES
PCO2 BLDV: 42.8 MM HG (ref 42–50)
PH BLDV: 7.33 [PH] (ref 7.3–7.4)
PLATELET # BLD AUTO: 222 THOUSANDS/UL (ref 149–390)
PMV BLD AUTO: 11.6 FL (ref 8.9–12.7)
PO2 BLDV: 57.1 MM HG (ref 35–45)
POTASSIUM SERPL-SCNC: 4.1 MMOL/L (ref 3.5–5.3)
PR INTERVAL: 144 MS
PROT SERPL-MCNC: 6.7 G/DL (ref 6.4–8.4)
PROTHROMBIN TIME: 13.9 SECONDS (ref 11.6–14.5)
QRS AXIS: 45 DEGREES
QRSD INTERVAL: 92 MS
QT INTERVAL: 376 MS
QTC INTERVAL: 472 MS
RBC # BLD AUTO: 3.57 MILLION/UL (ref 3.88–5.62)
SODIUM SERPL-SCNC: 127 MMOL/L (ref 135–147)
T WAVE AXIS: 58 DEGREES
TSH SERPL DL<=0.05 MIU/L-ACNC: 3.68 UIU/ML (ref 0.45–4.5)
VENTRICULAR RATE: 95 BPM
WBC # BLD AUTO: 9.11 THOUSAND/UL (ref 4.31–10.16)

## 2023-10-17 PROCEDURE — 82140 ASSAY OF AMMONIA: CPT | Performed by: EMERGENCY MEDICINE

## 2023-10-17 PROCEDURE — 82010 KETONE BODYS QUAN: CPT | Performed by: EMERGENCY MEDICINE

## 2023-10-17 PROCEDURE — 85025 COMPLETE CBC W/AUTO DIFF WBC: CPT | Performed by: EMERGENCY MEDICINE

## 2023-10-17 PROCEDURE — 85730 THROMBOPLASTIN TIME PARTIAL: CPT | Performed by: EMERGENCY MEDICINE

## 2023-10-17 PROCEDURE — 82077 ASSAY SPEC XCP UR&BREATH IA: CPT | Performed by: EMERGENCY MEDICINE

## 2023-10-17 PROCEDURE — 93010 ELECTROCARDIOGRAM REPORT: CPT | Performed by: STUDENT IN AN ORGANIZED HEALTH CARE EDUCATION/TRAINING PROGRAM

## 2023-10-17 PROCEDURE — 82805 BLOOD GASES W/O2 SATURATION: CPT | Performed by: EMERGENCY MEDICINE

## 2023-10-17 PROCEDURE — 82948 REAGENT STRIP/BLOOD GLUCOSE: CPT

## 2023-10-17 PROCEDURE — 93005 ELECTROCARDIOGRAM TRACING: CPT

## 2023-10-17 PROCEDURE — 84443 ASSAY THYROID STIM HORMONE: CPT | Performed by: EMERGENCY MEDICINE

## 2023-10-17 PROCEDURE — 85610 PROTHROMBIN TIME: CPT | Performed by: EMERGENCY MEDICINE

## 2023-10-17 PROCEDURE — 36415 COLL VENOUS BLD VENIPUNCTURE: CPT | Performed by: EMERGENCY MEDICINE

## 2023-10-17 PROCEDURE — 80053 COMPREHEN METABOLIC PANEL: CPT | Performed by: EMERGENCY MEDICINE

## 2023-10-17 RX ORDER — BLOOD-GLUCOSE METER
EACH MISCELLANEOUS
Qty: 100 EACH | Refills: 0 | Status: SHIPPED | OUTPATIENT
Start: 2023-10-17

## 2023-10-17 RX ORDER — INSULIN LISPRO 100 [IU]/ML
5 INJECTION, SOLUTION INTRAVENOUS; SUBCUTANEOUS
Qty: 4.5 ML | Refills: 0 | Status: SHIPPED | OUTPATIENT
Start: 2023-10-17 | End: 2023-11-16

## 2023-10-17 RX ORDER — LANCETS
EACH MISCELLANEOUS
Qty: 100 EACH | Refills: 0 | Status: SHIPPED | OUTPATIENT
Start: 2023-10-17

## 2023-10-17 RX ORDER — PEN NEEDLE, DIABETIC 32GX 5/32"
NEEDLE, DISPOSABLE MISCELLANEOUS
Qty: 100 EACH | Refills: 0 | Status: SHIPPED | OUTPATIENT
Start: 2023-10-17

## 2023-10-17 RX ORDER — ACETAMINOPHEN 325 MG/1
975 TABLET ORAL ONCE
Status: COMPLETED | OUTPATIENT
Start: 2023-10-17 | End: 2023-10-17

## 2023-10-17 RX ADMIN — INSULIN HUMAN 10 UNITS: 100 INJECTION, SOLUTION PARENTERAL at 02:18

## 2023-10-17 RX ADMIN — SODIUM CHLORIDE, SODIUM LACTATE, POTASSIUM CHLORIDE, AND CALCIUM CHLORIDE 1000 ML: .6; .31; .03; .02 INJECTION, SOLUTION INTRAVENOUS at 01:34

## 2023-10-17 RX ADMIN — ACETAMINOPHEN 325MG 975 MG: 325 TABLET ORAL at 05:13

## 2023-10-17 NOTE — ED PROVIDER NOTES
History  Chief Complaint   Patient presents with    Hyperglycemia - Symptomatic     Hx diabetes - hasn't taken medication in over a month. BG reading "hi", c/o facial pain, dizziness. L sided body pain. Pt reports drinking "a lot of beer" today      60-year-old male with a history of diabetes, alcohol abuse paroxysmal A-fib, chronic kidney disease, medication noncompliance who presents for "not feeling well". Patient is Afghan-speaking only so the  line was used. Interview difficult given that the patient is "hard of hearing". Patient complains of not feeling well and diffuse pain. Does admit to drinking alcohol. Prior to Admission Medications   Prescriptions Last Dose Informant Patient Reported? Taking? Blood Glucose Monitoring Suppl (Accu-Chek Guide) w/Device KIT More than a month  No No   Sig: Use 3 (three) times a day   Insulin Pen Needle (BD Pen Needle Claudia 2nd Gen) 32G X 4 MM MISC More than a month  No No   Sig: For use with insulin pen. Pharmacy may dispense brand covered by insurance. Insulin Pen Needle (BD Pen Needle Claudia 2nd Gen) 32G X 4 MM MISC   No Yes   Sig: For use with insulin pen. Pharmacy may dispense brand covered by insurance. Lancets (accu-chek multiclix) lancets More than a month  No No   Sig: Check blood sugar 3 times a day   Lancets (accu-chek multiclix) lancets   No Yes   Sig: Check blood sugar 3 times a day   glucose blood (OneTouch Verio) test strip More than a month  No No   Sig: May substitute brand based on insurance coverage. Check glucose TID. glucose blood (OneTouch Verio) test strip   No Yes   Sig: May substitute brand based on insurance coverage. Check glucose TID.    insulin lispro (HumaLOG KwikPen) 100 units/mL injection pen   No No   Sig: Inject 5 Units under the skin 3 (three) times a day with meals   insulin lispro (HumaLOG KwikPen) 100 units/mL injection pen   No Yes   Sig: Inject 5 Units under the skin 3 (three) times a day with meals   lidocaine (LIDODERM) 5 %   No No   Sig: Apply 1 patch topically over 12 hours daily Remove & Discard patch within 12 hours or as directed by MD   thiamine (VITAMIN B1) 100 mg tablet Not Taking  No No   Sig: Take 1 tablet (100 mg total) by mouth daily Do not start before January 3, 2023. Patient not taking: Reported on 10/17/2023      Facility-Administered Medications: None       Past Medical History:   Diagnosis Date    Alcohol abuse     Chronic pancreatitis ([address] [facility])     Diabetes mellitus ([address])     Type 2    Pancreatitis     Paroxysmal A-fib (HCC)     Thrombocytopenia (HCC)        Past Surgical History:   Procedure Laterality Date    INCISION AND DRAINAGE OF WOUND N/A 8/16/2020    Procedure: INCISION AND DRAINAGE (I&D) HEAD/FACE;  Surgeon: Gina Warren DMD;  Location: [facility];  Service: Maxillofacial    IR BIOPSY BONE MARROW  2/7/2019    REMOVAL OF IMPACTED TOOTH - COMPLETELY BONY N/A 8/16/2020    Procedure: EXTRACTION TEETH MULTIPLE #2, 3;  Surgeon: Gina Warren DMD;  Location: [facility];  Service: Maxillofacial       Family History   Problem Relation Age of Onset    Diabetes Mother     Hypertension Mother     Hyperlipidemia Father      I have reviewed and agree with the history as documented. E-Cigarette/Vaping     E-Cigarette/Vaping Substances     Social History     Tobacco Use    Smoking status: Former    Smokeless tobacco: Never   Substance Use Topics    Alcohol use: Yes    Drug use: Yes     Types: Cocaine       Review of Systems   Unable to perform ROS: Other (Intoxicated)       Physical Exam  Physical Exam  Vitals and nursing note reviewed. Constitutional:       General: He is not in acute distress. Appearance: He is well-developed. Comments: Chronically ill-appearing. HENT:      Head: Normocephalic and atraumatic. Mouth/Throat:      Lips: Pink. Mouth: Mucous membranes are moist.      Dentition: Abnormal dentition. Dental caries present.    Eyes:      General: Lids are normal. Extraocular Movements: Extraocular movements intact. Conjunctiva/sclera: Conjunctivae normal.      Pupils: Pupils are equal, round, and reactive to light. Cardiovascular:      Rate and Rhythm: Normal rate and regular rhythm. Heart sounds: Normal heart sounds. No murmur heard. Pulmonary:      Effort: Pulmonary effort is normal.      Breath sounds: Normal breath sounds. Abdominal:      General: There is no distension. Palpations: Abdomen is soft. Tenderness: There is no abdominal tenderness. There is no guarding or rebound. Musculoskeletal:         General: No swelling. Cervical back: Full passive range of motion without pain, normal range of motion and neck supple. Skin:     General: Skin is warm. Capillary Refill: Capillary refill takes less than 2 seconds. Findings: No rash. Neurological:      General: No focal deficit present. Mental Status: He is alert. Comments: Moving all extremities with 5 out of 5 strength. Psychiatric:         Speech: Speech is slurred. Comments: Clinically intoxicated.          Vital Signs  ED Triage Vitals   Temperature Pulse Respirations Blood Pressure SpO2   10/17/23 0051 10/17/23 0046 10/17/23 0046 10/17/23 0046 10/17/23 0046   97.9 °F (36.6 °C) 97 22 (!) 198/97 98 %      Temp Source Heart Rate Source Patient Position - Orthostatic VS BP Location FiO2 (%)   10/17/23 0051 10/17/23 0046 10/17/23 0046 10/17/23 0046 --   Oral Monitor Lying Right arm       Pain Score       10/17/23 0459       9           Vitals:    10/17/23 0330 10/17/23 0400 10/17/23 0430 10/17/23 0500   BP: 111/64 93/57 126/70 129/70   Pulse: 83 79 79 78   Patient Position - Orthostatic VS:             Visual Acuity  Visual Acuity      Flowsheet Row Most Recent Value   L Pupil Size (mm) 3   R Pupil Size (mm) 3            ED Medications  Medications   lactated ringers bolus 1,000 mL (0 mL Intravenous Stopped 10/17/23 0220)   insulin regular (HumuLIN R,NovoLIN R) injection 10 Units (10 Units Intravenous Given 10/17/23 0218)   acetaminophen (TYLENOL) tablet 975 mg (975 mg Oral Given 10/17/23 0513)       Diagnostic Studies  Results Reviewed       Procedure Component Value Units Date/Time    Fingerstick Glucose (POCT) [[id number]]  (Abnormal) Collected: 10/17/23 0320    Lab Status: Final result Updated: 10/17/23 0321     POC Glucose 468 mg/dl     Comprehensive metabolic panel [[id number]]  (Abnormal) Collected: 10/17/23 0122    Lab Status: Final result Specimen: Blood from Arm, Left Updated: 10/17/23 0214     Sodium 127 mmol/L      Potassium 4.1 mmol/L      Chloride 95 mmol/L      CO2 23 mmol/L      ANION GAP 9 mmol/L      BUN 16 mg/dL      Creatinine 1.48 mg/dL      Glucose 810 mg/dL      Calcium 8.3 mg/dL      AST 24 U/L      ALT 29 U/L      Alkaline Phosphatase 81 U/L      Total Protein 6.7 g/dL      Albumin 3.7 g/dL      Total Bilirubin 0.47 mg/dL      eGFR 53 ml/min/1.73sq m     Narrative:      I call the nurse before releasing the result of glucose then tiger text both attending ans the [address] [facility] guidelines for Chronic Kidney Disease (CKD):     Stage 1 with normal or high GFR (GFR > 90 mL/min/1.73 square meters)    Stage 2 Mild CKD (GFR = 60-89 mL/min/1.73 square meters)    Stage 3A Moderate CKD (GFR = 45-59 mL/min/1.73 square meters)    Stage 3B Moderate CKD (GFR = 30-44 mL/min/1.73 square meters)    Stage 4 Severe CKD (GFR = 15-29 mL/min/1.73 square meters)    Stage 5 End Stage CKD (GFR <15 mL/min/1.73 square meters)  Note: GFR calculation is accurate only with a steady state creatinine    TSH, 3rd generation with Free T4 reflex [[id number]]  (Normal) Collected: 10/17/23 0122    Lab Status: Final result Specimen: Blood from Arm, Left Updated: 10/17/23 0205     TSH 3RD GENERATON 3.679 uIU/mL     Ethanol [[id number]]  (Abnormal) Collected: 10/17/23 0122    Lab Status: Final result Specimen: Blood from Arm, Left Updated: 10/17/23 0154     Ethanol Lvl 236 mg/dL     Ammonia [[id number]]  (Normal) Collected: 10/17/23 0122    Lab Status: Final result Specimen: Blood from Arm, Left Updated: 10/17/23 0154     Ammonia 38 umol/L     Protime-INR [[id number]]  (Normal) Collected: 10/17/23 0122    Lab Status: Final result Specimen: Blood from Arm, Left Updated: 10/17/23 0147     Protime 13.9 seconds      INR 1.07    APTT [[id number]]  (Normal) Collected: 10/17/23 0122    Lab Status: Final result Specimen: Blood from Arm, Left Updated: 10/17/23 0147     PTT 25 seconds     Beta Hydroxybutyrate [[id number]]  (Normal) Collected: 10/17/23 0122    Lab Status: Final result Specimen: Blood from Arm, Left Updated: 10/17/23 0138     BETA-HYDROXYBUTYRATE 0.1 mmol/L     CBC and differential [[id number]]  (Abnormal) Collected: 10/17/23 0122    Lab Status: Final result Specimen: Blood from Arm, Left Updated: 10/17/23 0135     WBC 9.11 Thousand/uL      RBC 3.57 Million/uL      Hemoglobin 11.2 g/dL      Hematocrit 31.7 %      MCV 89 fL      MCH 31.4 pg      MCHC 35.3 g/dL      RDW 10.8 %      MPV 11.6 fL      Platelets 995 Thousands/uL      nRBC 0 /100 WBCs      Neutrophils Relative 65 %      Immat GRANS % 0 %      Lymphocytes Relative 27 %      Monocytes Relative 7 %      Eosinophils Relative 1 %      Basophils Relative 0 %      Neutrophils Absolute 5.77 Thousands/µL      Immature Grans Absolute 0.03 Thousand/uL      Lymphocytes Absolute 2.49 Thousands/µL      Monocytes Absolute 0.67 Thousand/µL      Eosinophils Absolute 0.13 Thousand/µL      Basophils Absolute 0.02 Thousands/µL     Blood gas, venous [[id number]]  (Abnormal) Collected: 10/17/23 0122    Lab Status: Final result Specimen: Blood from Arm, Left Updated: 10/17/23 0135     pH, Sami 7.326     pCO2, Sami 42.8 mm Hg      pO2, Sami 57.1 mm Hg      HCO3, Sami 21.9 mmol/L      Base Excess, Sami -4.0 mmol/L      O2 Content, Sami 14.5 ml/dL      O2 HGB, VENOUS 84.7 %     Fingerstick Glucose (POCT) [[id number]]  (Abnormal) Collected: 10/17/23 0629    Lab Status: Final result Updated: 10/17/23 0043     POC Glucose >500 mg/dl                    No orders to display              Procedures  ECG 12 Lead Documentation Only    Date/Time: 10/17/2023 1:40 AM    Performed by: Roxy Wayne MD  Authorized by: Roxy Wayne MD    ECG reviewed by me, the ED Provider: yes    Patient location:  ED  Previous ECG:     Previous ECG:  Compared to current    Comparison ECG info:  1/29/23    Similarity:  No change    Comparison to cardiac monitor: Yes    Interpretation:     Interpretation: non-specific    Rate:     ECG rate:  95    ECG rate assessment: normal    Rhythm:     Rhythm: sinus rhythm    Ectopy:     Ectopy: none    QRS:     QRS axis:  Normal    QRS intervals:  Normal  Conduction:     Conduction: normal    ST segments:     ST segments:  Non-specific  T waves:     T waves: non-specific             ED Course  ED Course as of 10/17/23 0601   Tue Oct 17, 2023   0544 Patient able to ambulate with a steady gait. Clinically sober at this time. SBIRT 20yo+      Flowsheet Row Most Recent Value   Initial Alcohol Screen: US AUDIT-C     1. How often do you have a drink containing alcohol? 3 Filed at: 10/17/2023 0050   2. How many drinks containing alcohol do you have on a typical day you are drinking? 4 Filed at: 10/17/2023 0050   3a. Male UNDER 65: How often do you have five or more drinks on one occasion? 2 Filed at: 10/17/2023 0050   3b. FEMALE Any Age, or MALE 65+: How often do you have 4 or more drinks on one occassion? 0 Filed at: 10/17/2023 0050   Audit-C Score 9 Filed at: 10/17/2023 0050   Full Alcohol Screen: US AUDIT    4. How often during the last year have you found that you were not able to stop drinking once you had started? 1 Filed at: 10/17/2023 0050   5. How often during past year have you failed to do what was normally expected of you because of drinking? 0 Filed at: 10/17/2023 0050   6.  How often in past year have you needed a first drink in the morning to get yourself going after a heavy drinking session? 0 Filed at: 10/17/2023 0050   7. How often in past year have you had feeling of guilt or remorse after drinking? 0 Filed at: 10/17/2023 0050   8. How often in past year have you been unable to remember what happened night before because you had been drinking? 0 Filed at: 10/17/2023 0050   9. Have you or someone else been injured as a result of your drinking? 0 Filed at: 10/17/2023 0050   10. Has a relative, friend, doctor or other health worker been concerned about your drinking and suggested you cut down?  0 Filed at: 10/17/2023 0050   AUDIT Total Score 10 Filed at: 10/17/2023 0050   MUSA: How many times in the past year have you. .. Used an illegal drug or used a prescription medication for non-medical reasons? Never Filed at: 10/17/2023 0050                      Medical Decision Making  Patient not taking medications. Concern for possible DKA versus arrhythmia versus dehydration versus alcohol intoxication versus thyroid abnormality. -CBC to evaluate for marked leukocytosis or anemia. -CMP to evaluate electrolytes, glucose, anion gap in the setting of possible DKA, arrhythmia, and dehydration.  -Ethanol level to ensure patient is intoxicated. -TSH to evaluate for thyroid abnormalities. -VBG to evaluate for acidemia in the setting of possible DKA. -Beta hydroxybutyrate to evaluate for ketosis in the setting of possible DKA. -We will start treating with 1 L of IV lactated Ringer's.  -Disposition pending results.     Problems Addressed:  Alcoholic intoxication without complication [facility]): chronic illness or injury with exacerbation, progression, or side effects of treatment  Hyperglycemia: chronic illness or injury with exacerbation, progression, or side effects of treatment that poses a threat to life or bodily functions  Noncompliance with medication regimen: chronic illness or injury    Amount and/or Complexity of Data Reviewed  Labs: ordered. ECG/medicine tests: ordered and independent interpretation performed. Risk  OTC drugs. Prescription drug management. Disposition  Final diagnoses:   Alcoholic intoxication without complication ([address])   Hyperglycemia   Noncompliance with medication regimen     Time reflects when diagnosis was documented in both MDM as applicable and the Disposition within this note       Time User Action Codes Description Comment    10/17/2023  3:29 AM Gonzalez Jesus P Add [A70.336] Alcoholic intoxication without complication ([address])     38/84/0998  3:30 AM Gonzalez Jesus P Add [R73.9] Hyperglycemia     10/17/2023  3:30 AM Grabiel Arechiga Add [Z91.148] Noncompliance with medication regimen     10/17/2023  6:00 AM Isma Mejia Add [E11.22,  N18.30,  Z79.4] Type 2 diabetes mellitus with stage 3 chronic kidney disease, with long-term current use of insulin, unspecified whether stage 3a or 3b CKD ([address])     10/17/2023  6:00 AM Isma Mejia Add [E11.65,  Z79.4] Type 2 diabetes mellitus with hyperglycemia, with long-term current use of insulin [facility])           ED Disposition       ED Disposition   Discharge    Condition   Stable    Date/Time   Tue Oct 17, 2023 [address] discharge to home/self care.                    Follow-up Information       Follow up With Specialties Details Why Contact Info Additional [address] [facility] Family Medicine Schedule an appointment as soon as possible for a visit   [address] [facility], [facility] [facility] [id number]  [address] [facility], [address] [facility], [address] [place], [place], [address] [facility] Emergency Department Emergency Medicine Go to  If symptoms worsen [address] [id number]  [address] [facility] Emergency Department, [address] [place], [facility]            Current Discharge Medication List        CONTINUE these medications which have CHANGED    Details   glucose blood (OneTouch Verio) test strip May substitute brand based on insurance coverage. Check glucose TID. Qty: 100 each, Refills: 0    Associated Diagnoses: Type 2 diabetes mellitus with stage 3 chronic kidney disease, with long-term current use of insulin, unspecified whether stage 3a or 3b CKD ([facility])      insulin lispro (HumaLOG KwikPen) 100 units/mL injection pen Inject 5 Units under the skin 3 (three) times a day with meals  Qty: 4.5 mL, Refills: 0    Associated Diagnoses: Type 2 diabetes mellitus with stage 3 chronic kidney disease, with long-term current use of insulin, unspecified whether stage 3a or 3b CKD ([facility])      Insulin Pen Needle (BD Pen Needle Claudia 2nd Gen) 32G X 4 MM MISC For use with insulin pen. Pharmacy may dispense brand covered by insurance.   Qty: 100 each, Refills: 0    Associated Diagnoses: Type 2 diabetes mellitus with stage 3 chronic kidney disease, with long-term current use of insulin, unspecified whether stage 3a or 3b CKD ([facility])      Lancets (accu-chek multiclix) lancets Check blood sugar 3 times a day  Qty: 100 each, Refills: 0    Associated Diagnoses: Type 2 diabetes mellitus with hyperglycemia, with long-term current use of insulin ([facility])           CONTINUE these medications which have NOT CHANGED    Details   Blood Glucose Monitoring Suppl (Accu-Chek Guide) w/Device KIT Use 3 (three) times a day  Qty: 1 kit, Refills: 0    Associated Diagnoses: Type 2 diabetes mellitus with hyperglycemia, with long-term current use of insulin ([facility])      lidocaine (LIDODERM) 5 % Apply 1 patch topically over 12 hours daily Remove & Discard patch within 12 hours or as directed by MD  Qty: 30 patch, Refills: 0    Associated Diagnoses: Chronic low back pain      thiamine (VITAMIN B1) 100 mg tablet Take 1 tablet (100 mg total) by mouth daily Do not start before January 3, 2023Isaac Maddie: 30 tablet, Refills: 0    Associated Diagnoses: Alcoholic intoxication without complication ([address] [facility])             No discharge procedures on file.     PDMP Review         Value Time User    PDMP Reviewed  Yes 8/18/2020 11:39 AM Krysten Vargas MD            ED Provider  Electronically Signed by             Jorgito Garzon MD  10/17/23 [address] [facility] Louis Dejesus MD  10/17/23 7701

## 2023-10-17 NOTE — ED NOTES
Patients repeat POCT BAT was 0.094.  Patient made aware that he is being discharged      Zhou Chisholmr, [address] [place] [address]  10/17/23 4061

## 2023-11-24 ENCOUNTER — VBI (OUTPATIENT)
Dept: ADMINISTRATIVE | Facility: OTHER | Age: 53
End: 2023-11-24

## 2023-12-15 NOTE — BRIEF OP NOTE (RAD/CATH)
Bone marrow biopsy Procedure Note    PATIENT NAME: El Figueroa  : 1970  MRN: [id number]     Pre-op Diagnosis:   1  Anemia    2  Hyponatremia    3  Hyperglycemia    4  Acute renal failure ([facility] [address] )    5  Acute kidney failure ([facility] )    6  Type 2 diabetes mellitus with hyperglycemia, with long-term current use of insulin (HCC)      Post-op Diagnosis:   1  Anemia    2  Hyponatremia    3  Hyperglycemia    4  Acute renal failure ([facility] [address] )    5  Acute kidney failure ([facility] [address] )    6   Type 2 diabetes mellitus with hyperglycemia, with long-term current use of insulin ([facility] )        Surgeon:   Benton Doherty MD     Estimated Blood Loss:  Minimal  Findings:  Bone marrow biopsy with CT guidance    Specimens:  Aspirate and core    Complications:  None    Anesthesia: Conscious sedation    Benton Doherty MD     Date: 2019  Time: 11:29 AM 15-Dec-2023 03:14

## 2024-01-25 ENCOUNTER — APPOINTMENT (EMERGENCY)
Dept: RADIOLOGY | Facility: HOSPITAL | Age: 54
DRG: 053 | End: 2024-01-25
Payer: COMMERCIAL

## 2024-01-25 ENCOUNTER — HOSPITAL ENCOUNTER (INPATIENT)
Facility: HOSPITAL | Age: 54
LOS: 2 days | Discharge: HOME | DRG: 053 | End: 2024-01-27
Attending: EMERGENCY MEDICINE | Admitting: INTERNAL MEDICINE
Payer: COMMERCIAL

## 2024-01-25 ENCOUNTER — APPOINTMENT (EMERGENCY)
Dept: CT IMAGING | Facility: HOSPITAL | Age: 54
DRG: 053 | End: 2024-01-25
Payer: COMMERCIAL

## 2024-01-25 DIAGNOSIS — E11.9 TYPE 2 DIABETES MELLITUS (HCC): ICD-10-CM

## 2024-01-25 DIAGNOSIS — G93.40 ENCEPHALOPATHY: ICD-10-CM

## 2024-01-25 DIAGNOSIS — E86.0 DEHYDRATION: ICD-10-CM

## 2024-01-25 DIAGNOSIS — R56.9 SEIZURE (HCC): ICD-10-CM

## 2024-01-25 DIAGNOSIS — I10 ESSENTIAL HYPERTENSION: ICD-10-CM

## 2024-01-25 DIAGNOSIS — R73.9 HYPERGLYCEMIA: ICD-10-CM

## 2024-01-25 DIAGNOSIS — E11.00 TYPE 2 DIABETES MELLITUS WITH HYPEROSMOLARITY WITHOUT COMA, WITHOUT LONG-TERM CURRENT USE OF INSULIN (HCC): ICD-10-CM

## 2024-01-25 DIAGNOSIS — R41.82 ALTERED MENTAL STATUS: Primary | ICD-10-CM

## 2024-01-25 PROBLEM — F10.10 ALCOHOL ABUSE: Status: ACTIVE | Noted: 2024-01-25

## 2024-01-25 PROBLEM — R79.89 LACTIC ACID BLOOD INCREASED: Status: ACTIVE | Noted: 2024-01-25

## 2024-01-25 LAB
25(OH)D3 SERPL-MCNC: 12.8 NG/ML (ref 30–100)
2HR DELTA HS TROPONIN: 1 NG/L
ALBUMIN SERPL BCP-MCNC: 3.6 G/DL (ref 3.5–5)
ALP SERPL-CCNC: 103 U/L (ref 34–104)
ALT SERPL W P-5'-P-CCNC: 56 U/L (ref 7–52)
AMMONIA PLAS-SCNC: 31 UMOL/L (ref 18–72)
ANION GAP SERPL CALCULATED.3IONS-SCNC: 5 MMOL/L
ANION GAP SERPL CALCULATED.3IONS-SCNC: 9 MMOL/L
AST SERPL W P-5'-P-CCNC: 32 U/L (ref 13–39)
ATRIAL RATE: 95 BPM
BASE EX.OXY STD BLDV CALC-SCNC: 54.4 % (ref 60–80)
BASE EXCESS BLDV CALC-SCNC: -4.4 MMOL/L
BASOPHILS # BLD AUTO: 0.02 THOUSANDS/ÂΜL (ref 0–0.1)
BASOPHILS NFR BLD AUTO: 0 % (ref 0–1)
BETA-HYDROXYBUTYRATE: 0.2 MMOL/L
BILIRUB SERPL-MCNC: 0.54 MG/DL (ref 0.2–1)
BUN SERPL-MCNC: 7 MG/DL (ref 5–25)
BUN SERPL-MCNC: 8 MG/DL (ref 5–25)
CA-I BLD-SCNC: 1.2 MMOL/L (ref 1.12–1.32)
CALCIUM SERPL-MCNC: 8.6 MG/DL (ref 8.4–10.2)
CALCIUM SERPL-MCNC: 9 MG/DL (ref 8.4–10.2)
CARDIAC TROPONIN I PNL SERPL HS: 8 NG/L
CARDIAC TROPONIN I PNL SERPL HS: 9 NG/L
CHLORIDE SERPL-SCNC: 105 MMOL/L (ref 96–108)
CHLORIDE SERPL-SCNC: 88 MMOL/L (ref 96–108)
CO2 SERPL-SCNC: 27 MMOL/L (ref 21–32)
CO2 SERPL-SCNC: 30 MMOL/L (ref 21–32)
CREAT SERPL-MCNC: 0.98 MG/DL (ref 0.6–1.3)
CREAT SERPL-MCNC: 1.39 MG/DL (ref 0.6–1.3)
EOSINOPHIL # BLD AUTO: 0.13 THOUSAND/ÂΜL (ref 0–0.61)
EOSINOPHIL NFR BLD AUTO: 1 % (ref 0–6)
ERYTHROCYTE [DISTWIDTH] IN BLOOD BY AUTOMATED COUNT: 11.1 % (ref 11.6–15.1)
ETHANOL SERPL-MCNC: <10 MG/DL
FLUAV RNA RESP QL NAA+PROBE: NEGATIVE
FLUBV RNA RESP QL NAA+PROBE: NEGATIVE
FOLATE SERPL-MCNC: >22.3 NG/ML
GFR SERPL CREATININE-BSD FRML MDRD: 57 ML/MIN/1.73SQ M
GFR SERPL CREATININE-BSD FRML MDRD: 87 ML/MIN/1.73SQ M
GLUCOSE SERPL-MCNC: 102 MG/DL (ref 65–140)
GLUCOSE SERPL-MCNC: 1128 MG/DL (ref 65–140)
GLUCOSE SERPL-MCNC: 118 MG/DL (ref 65–140)
GLUCOSE SERPL-MCNC: 292 MG/DL (ref 65–140)
GLUCOSE SERPL-MCNC: 345 MG/DL (ref 65–140)
GLUCOSE SERPL-MCNC: 47 MG/DL (ref 65–140)
GLUCOSE SERPL-MCNC: 740 MG/DL (ref 65–140)
GLUCOSE SERPL-MCNC: 98 MG/DL (ref 65–140)
GLUCOSE SERPL-MCNC: >500 MG/DL (ref 65–140)
HCO3 BLDV-SCNC: 24.1 MMOL/L (ref 24–30)
HCT VFR BLD AUTO: 35.1 % (ref 36.5–49.3)
HGB BLD-MCNC: 12 G/DL (ref 12–17)
IMM GRANULOCYTES # BLD AUTO: 0.04 THOUSAND/UL (ref 0–0.2)
IMM GRANULOCYTES NFR BLD AUTO: 0 % (ref 0–2)
LACTATE SERPL-SCNC: 2.8 MMOL/L (ref 0.5–2)
LACTATE SERPL-SCNC: 3.7 MMOL/L (ref 0.5–2)
LACTATE SERPL-SCNC: 3.9 MMOL/L (ref 0.5–2)
LYMPHOCYTES # BLD AUTO: 1.2 THOUSANDS/ÂΜL (ref 0.6–4.47)
LYMPHOCYTES NFR BLD AUTO: 12 % (ref 14–44)
MAGNESIUM SERPL-MCNC: 1.8 MG/DL (ref 1.9–2.7)
MCH RBC QN AUTO: 31 PG (ref 26.8–34.3)
MCHC RBC AUTO-ENTMCNC: 34.2 G/DL (ref 31.4–37.4)
MCV RBC AUTO: 91 FL (ref 82–98)
MONOCYTES # BLD AUTO: 0.69 THOUSAND/ÂΜL (ref 0.17–1.22)
MONOCYTES NFR BLD AUTO: 7 % (ref 4–12)
NEUTROPHILS # BLD AUTO: 8.27 THOUSANDS/ÂΜL (ref 1.85–7.62)
NEUTS SEG NFR BLD AUTO: 80 % (ref 43–75)
NRBC BLD AUTO-RTO: 0 /100 WBCS
O2 CT BLDV-SCNC: 9.9 ML/DL
P AXIS: 68 DEGREES
PCO2 BLDV: 60.5 MM HG (ref 42–50)
PH BLDV: 7.22 [PH] (ref 7.3–7.4)
PLATELET # BLD AUTO: 138 THOUSANDS/UL (ref 149–390)
PMV BLD AUTO: 12.4 FL (ref 8.9–12.7)
PO2 BLDV: 30.7 MM HG (ref 35–45)
POTASSIUM SERPL-SCNC: 2.5 MMOL/L (ref 3.5–5.3)
POTASSIUM SERPL-SCNC: 3.6 MMOL/L (ref 3.5–5.3)
PR INTERVAL: 158 MS
PROT SERPL-MCNC: 6.4 G/DL (ref 6.4–8.4)
QRS AXIS: 48 DEGREES
QRSD INTERVAL: 96 MS
QT INTERVAL: 394 MS
QTC INTERVAL: 495 MS
RBC # BLD AUTO: 3.87 MILLION/UL (ref 3.88–5.62)
RSV RNA RESP QL NAA+PROBE: NEGATIVE
SARS-COV-2 RNA RESP QL NAA+PROBE: NEGATIVE
SODIUM SERPL-SCNC: 124 MMOL/L (ref 135–147)
SODIUM SERPL-SCNC: 140 MMOL/L (ref 135–147)
T WAVE AXIS: 22 DEGREES
TSH SERPL DL<=0.05 MIU/L-ACNC: 1.5 UIU/ML (ref 0.45–4.5)
VENTRICULAR RATE: 95 BPM
WBC # BLD AUTO: 10.35 THOUSAND/UL (ref 4.31–10.16)

## 2024-01-25 PROCEDURE — 71045 X-RAY EXAM CHEST 1 VIEW: CPT

## 2024-01-25 PROCEDURE — 82948 REAGENT STRIP/BLOOD GLUCOSE: CPT

## 2024-01-25 PROCEDURE — 82010 KETONE BODYS QUAN: CPT | Performed by: PHYSICIAN ASSISTANT

## 2024-01-25 PROCEDURE — 82140 ASSAY OF AMMONIA: CPT | Performed by: PHYSICIAN ASSISTANT

## 2024-01-25 PROCEDURE — 85049 AUTOMATED PLATELET COUNT: CPT | Performed by: INTERNAL MEDICINE

## 2024-01-25 PROCEDURE — 0241U HB NFCT DS VIR RESP RNA 4 TRGT: CPT | Performed by: INTERNAL MEDICINE

## 2024-01-25 PROCEDURE — 36415 COLL VENOUS BLD VENIPUNCTURE: CPT

## 2024-01-25 PROCEDURE — 82607 VITAMIN B-12: CPT | Performed by: INTERNAL MEDICINE

## 2024-01-25 PROCEDURE — 80053 COMPREHEN METABOLIC PANEL: CPT | Performed by: EMERGENCY MEDICINE

## 2024-01-25 PROCEDURE — 85025 COMPLETE CBC W/AUTO DIFF WBC: CPT | Performed by: EMERGENCY MEDICINE

## 2024-01-25 PROCEDURE — 84443 ASSAY THYROID STIM HORMONE: CPT | Performed by: INTERNAL MEDICINE

## 2024-01-25 PROCEDURE — 94002 VENT MGMT INPAT INIT DAY: CPT

## 2024-01-25 PROCEDURE — 96365 THER/PROPH/DIAG IV INF INIT: CPT

## 2024-01-25 PROCEDURE — 99285 EMERGENCY DEPT VISIT HI MDM: CPT

## 2024-01-25 PROCEDURE — 96375 TX/PRO/DX INJ NEW DRUG ADDON: CPT

## 2024-01-25 PROCEDURE — 96361 HYDRATE IV INFUSION ADD-ON: CPT

## 2024-01-25 PROCEDURE — 99291 CRITICAL CARE FIRST HOUR: CPT | Performed by: INTERNAL MEDICINE

## 2024-01-25 PROCEDURE — 82805 BLOOD GASES W/O2 SATURATION: CPT | Performed by: PHYSICIAN ASSISTANT

## 2024-01-25 PROCEDURE — 84484 ASSAY OF TROPONIN QUANT: CPT | Performed by: EMERGENCY MEDICINE

## 2024-01-25 PROCEDURE — 80048 BASIC METABOLIC PNL TOTAL CA: CPT | Performed by: INTERNAL MEDICINE

## 2024-01-25 PROCEDURE — 82077 ASSAY SPEC XCP UR&BREATH IA: CPT | Performed by: PHYSICIAN ASSISTANT

## 2024-01-25 PROCEDURE — 96366 THER/PROPH/DIAG IV INF ADDON: CPT

## 2024-01-25 PROCEDURE — 99285 EMERGENCY DEPT VISIT HI MDM: CPT | Performed by: PHYSICIAN ASSISTANT

## 2024-01-25 PROCEDURE — 96368 THER/DIAG CONCURRENT INF: CPT

## 2024-01-25 PROCEDURE — G1004 CDSM NDSC: HCPCS

## 2024-01-25 PROCEDURE — 84425 ASSAY OF VITAMIN B-1: CPT | Performed by: INTERNAL MEDICINE

## 2024-01-25 PROCEDURE — 93005 ELECTROCARDIOGRAM TRACING: CPT

## 2024-01-25 PROCEDURE — 83605 ASSAY OF LACTIC ACID: CPT | Performed by: INTERNAL MEDICINE

## 2024-01-25 PROCEDURE — 83735 ASSAY OF MAGNESIUM: CPT | Performed by: INTERNAL MEDICINE

## 2024-01-25 PROCEDURE — 82746 ASSAY OF FOLIC ACID SERUM: CPT | Performed by: INTERNAL MEDICINE

## 2024-01-25 PROCEDURE — 82330 ASSAY OF CALCIUM: CPT | Performed by: INTERNAL MEDICINE

## 2024-01-25 PROCEDURE — 70450 CT HEAD/BRAIN W/O DYE: CPT

## 2024-01-25 PROCEDURE — 83605 ASSAY OF LACTIC ACID: CPT | Performed by: PHYSICIAN ASSISTANT

## 2024-01-25 PROCEDURE — 82306 VITAMIN D 25 HYDROXY: CPT | Performed by: INTERNAL MEDICINE

## 2024-01-25 PROCEDURE — 82947 ASSAY GLUCOSE BLOOD QUANT: CPT | Performed by: PHYSICIAN ASSISTANT

## 2024-01-25 RX ORDER — HYDRALAZINE HYDROCHLORIDE 20 MG/ML
5 INJECTION INTRAMUSCULAR; INTRAVENOUS EVERY 6 HOURS PRN
Status: DISCONTINUED | OUTPATIENT
Start: 2024-01-25 | End: 2024-01-27 | Stop reason: HOSPADM

## 2024-01-25 RX ORDER — INSULIN GLARGINE 100 [IU]/ML
8 INJECTION, SOLUTION SUBCUTANEOUS
Status: DISCONTINUED | OUTPATIENT
Start: 2024-01-25 | End: 2024-01-27 | Stop reason: HOSPADM

## 2024-01-25 RX ORDER — POTASSIUM CHLORIDE 14.9 MG/ML
20 INJECTION INTRAVENOUS
Status: COMPLETED | OUTPATIENT
Start: 2024-01-25 | End: 2024-01-26

## 2024-01-25 RX ORDER — ENOXAPARIN SODIUM 100 MG/ML
40 INJECTION SUBCUTANEOUS DAILY
Status: DISCONTINUED | OUTPATIENT
Start: 2024-01-26 | End: 2024-01-27 | Stop reason: HOSPADM

## 2024-01-25 RX ORDER — POTASSIUM CHLORIDE 14.9 MG/ML
20 INJECTION INTRAVENOUS ONCE
Status: COMPLETED | OUTPATIENT
Start: 2024-01-25 | End: 2024-01-25

## 2024-01-25 RX ORDER — LORAZEPAM 2 MG/ML
INJECTION INTRAMUSCULAR
Status: COMPLETED
Start: 2024-01-25 | End: 2024-01-25

## 2024-01-25 RX ORDER — SODIUM CHLORIDE 9 MG/ML
3 INJECTION INTRAVENOUS
Status: DISCONTINUED | OUTPATIENT
Start: 2024-01-25 | End: 2024-01-27 | Stop reason: HOSPADM

## 2024-01-25 RX ORDER — POTASSIUM CHLORIDE 20MEQ/15ML
40 LIQUID (ML) ORAL ONCE
Status: COMPLETED | OUTPATIENT
Start: 2024-01-25 | End: 2024-01-25

## 2024-01-25 RX ORDER — INSULIN LISPRO 100 [IU]/ML
5 INJECTION, SOLUTION INTRAVENOUS; SUBCUTANEOUS
Status: DISCONTINUED | OUTPATIENT
Start: 2024-01-25 | End: 2024-01-27 | Stop reason: HOSPADM

## 2024-01-25 RX ORDER — SODIUM CHLORIDE, SODIUM LACTATE, POTASSIUM CHLORIDE, CALCIUM CHLORIDE 600; 310; 30; 20 MG/100ML; MG/100ML; MG/100ML; MG/100ML
100 INJECTION, SOLUTION INTRAVENOUS CONTINUOUS
Status: DISCONTINUED | OUTPATIENT
Start: 2024-01-25 | End: 2024-01-26

## 2024-01-25 RX ORDER — MAGNESIUM SULFATE 1 G/100ML
1 INJECTION INTRAVENOUS ONCE
Status: COMPLETED | OUTPATIENT
Start: 2024-01-25 | End: 2024-01-25

## 2024-01-25 RX ORDER — LEVETIRACETAM 500 MG/5ML
1500 INJECTION, SOLUTION, CONCENTRATE INTRAVENOUS ONCE
Status: COMPLETED | OUTPATIENT
Start: 2024-01-25 | End: 2024-01-25

## 2024-01-25 RX ORDER — CHLORHEXIDINE GLUCONATE ORAL RINSE 1.2 MG/ML
15 SOLUTION DENTAL EVERY 12 HOURS SCHEDULED
Status: DISCONTINUED | OUTPATIENT
Start: 2024-01-25 | End: 2024-01-27 | Stop reason: HOSPADM

## 2024-01-25 RX ORDER — ACETAMINOPHEN 325 MG/1
650 TABLET ORAL EVERY 6 HOURS PRN
Status: DISCONTINUED | OUTPATIENT
Start: 2024-01-25 | End: 2024-01-27 | Stop reason: HOSPADM

## 2024-01-25 RX ORDER — SENNOSIDES 8.6 MG
1 TABLET ORAL
Status: DISCONTINUED | OUTPATIENT
Start: 2024-01-25 | End: 2024-01-27 | Stop reason: HOSPADM

## 2024-01-25 RX ORDER — LORAZEPAM 2 MG/ML
2 INJECTION INTRAMUSCULAR ONCE
Status: COMPLETED | OUTPATIENT
Start: 2024-01-25 | End: 2024-01-25

## 2024-01-25 RX ORDER — INSULIN GLARGINE 100 [IU]/ML
8 INJECTION, SOLUTION SUBCUTANEOUS
Status: DISCONTINUED | OUTPATIENT
Start: 2024-01-25 | End: 2024-01-25

## 2024-01-25 RX ORDER — INSULIN LISPRO 100 [IU]/ML
1-6 INJECTION, SOLUTION INTRAVENOUS; SUBCUTANEOUS
Status: DISCONTINUED | OUTPATIENT
Start: 2024-01-25 | End: 2024-01-27 | Stop reason: HOSPADM

## 2024-01-25 RX ADMIN — MAGNESIUM SULFATE IN DEXTROSE 1 G: 10 INJECTION, SOLUTION INTRAVENOUS at 19:33

## 2024-01-25 RX ADMIN — LORAZEPAM 2 MG: 2 INJECTION INTRAMUSCULAR; INTRAVENOUS at 10:40

## 2024-01-25 RX ADMIN — SODIUM CHLORIDE 5.5 UNITS/HR: 9 INJECTION, SOLUTION INTRAVENOUS at 11:52

## 2024-01-25 RX ADMIN — LORAZEPAM 2 MG: 2 INJECTION INTRAMUSCULAR at 10:40

## 2024-01-25 RX ADMIN — SODIUM CHLORIDE 0.7 UNITS/HR: 9 INJECTION, SOLUTION INTRAVENOUS at 19:12

## 2024-01-25 RX ADMIN — SENNOSIDES 8.6 MG: 8.6 TABLET, FILM COATED ORAL at 21:05

## 2024-01-25 RX ADMIN — POTASSIUM CHLORIDE 40 MEQ: 1.5 SOLUTION ORAL at 20:09

## 2024-01-25 RX ADMIN — POTASSIUM CHLORIDE 20 MEQ: 14.9 INJECTION, SOLUTION INTRAVENOUS at 11:48

## 2024-01-25 RX ADMIN — LEVETIRACETAM 1500 MG: 100 INJECTION, SOLUTION INTRAVENOUS at 10:49

## 2024-01-25 RX ADMIN — HYDRALAZINE HYDROCHLORIDE 5 MG: 20 INJECTION, SOLUTION INTRAMUSCULAR; INTRAVENOUS at 23:03

## 2024-01-25 RX ADMIN — SODIUM CHLORIDE 1000 ML: 0.9 INJECTION, SOLUTION INTRAVENOUS at 10:40

## 2024-01-25 RX ADMIN — POTASSIUM CHLORIDE 20 MEQ: 14.9 INJECTION, SOLUTION INTRAVENOUS at 21:05

## 2024-01-25 RX ADMIN — SODIUM CHLORIDE, SODIUM LACTATE, POTASSIUM CHLORIDE, AND CALCIUM CHLORIDE 1000 ML: .6; .31; .03; .02 INJECTION, SOLUTION INTRAVENOUS at 16:24

## 2024-01-25 RX ADMIN — POTASSIUM CHLORIDE 20 MEQ: 14.9 INJECTION, SOLUTION INTRAVENOUS at 19:30

## 2024-01-25 RX ADMIN — CHLORHEXIDINE GLUCONATE 15 ML: 1.2 RINSE ORAL at 21:05

## 2024-01-25 RX ADMIN — SODIUM CHLORIDE, SODIUM LACTATE, POTASSIUM CHLORIDE, AND CALCIUM CHLORIDE 100 ML/HR: .6; .31; .03; .02 INJECTION, SOLUTION INTRAVENOUS at 19:23

## 2024-01-25 NOTE — ASSESSMENT & PLAN NOTE
Lab Results   Component Value Date    HGBA1C 10.2 (H) 2023       Recent Labs     24  1011 24  1258 24  1421   POCGLU >500* >500* >500*       Blood Sugar Average: Last 72 hrs:    Glucose on admission 1128, anion gap 9, bicarbonate 27, BHB negative.  pH VB.2  Patient is on insulin at home, did not take the dose last night according to father.   Unclear if he had any signs of infection/fever.    Plan  Continue fluids  Insulin drip  Monitor and replete electrolytes as needed  N.p.o.  Accu-Cheks  Hypoglycemic protocol

## 2024-01-25 NOTE — ASSESSMENT & PLAN NOTE
Lab Results   Component Value Date    HGBA1C 10.2 (H) 01/30/2023       Recent Labs     01/25/24  1011 01/25/24  1258 01/25/24  1421   POCGLU >500* >500* >500*       Blood Sugar Average: Last 72 hrs:    Patient is on insulin at home, unclear of the regimen.    Plan  Insulin drip  Accu-Cheks  Hypoglycemic protocol

## 2024-01-25 NOTE — H&P
[facility]  H&P  Name: Wes Araujo 53 y.o. male I MRN: [id number]  Unit/Bed#: ED-28 I Date of Admission: 2024   Date of Service: 2024 I Hospital Day: 0      Assessment/Plan   * Hyperosmolar hyperglycemic state (HHS) (HCC)  Assessment & Plan  Lab Results   Component Value Date    HGBA1C 10.2 (H) 2023       Recent Labs     24  1011 24  1258 24  1421   POCGLU >500* >500* >500*         Blood Sugar Average: Last 72 hrs:    Glucose on admission 1128, anion gap 9, bicarbonate 27, BHB negative.  pH VB.2  Patient is on insulin at home, did not take the dose last night according to father.   Unclear if he had any signs of infection/fever.    Plan  Continue fluids  Insulin drip  Monitor and replete electrolytes as needed  N.p.o.  Accu-Cheks  Hypoglycemic protocol    Seizure (HCC)  Assessment & Plan  Patient had a witnessed generalized tonic-clonic seizure in the ED. Patient was found unconscious on the floor prior to admission, unclear if he had any jerking movements.    No prior history of seizures/epilepsy.    Patient is an alcoholic. Use large amount of alcohol daily.  Last alcohol intake unknown.    Ethanol level on admission less than 10.  CT head unremarkable.    Plan  EEG  Neurochecks  Ativan as needed for seizures  May need lumbar puncture for infectious workup if patient continues to develop seizures  Urine drug screen  Seizure precausions      Encephalopathy  Assessment & Plan  Patient appears drowsy and lethargic.  Does not answer any of the questions or does not follow commands.  Ammonia WNR. BUN WNR.  Imaging does not show any acute changes.  Likely in the setting of hyperglycemia.     Plan  Maintain normoglycemia  Maintain normotension, normothermia  Monitor and correct electrolytes  Avoid constipation  Urinary retention protocol  Start thiamine  Follow-up TSH, vitamin B12, folate.  Would consider infectious workup if patient continues to have  seizures or if encephalopathy does not improve with glucose correction.    Type 2 diabetes mellitus (HCC)  Assessment & Plan  Lab Results   Component Value Date    HGBA1C 10.2 (H) 01/30/2023       Recent Labs     01/25/24  1011 01/25/24  1258 01/25/24  1421   POCGLU >500* >500* >500*         Blood Sugar Average: Last 72 hrs:    Patient is on insulin at home, unclear of the regimen.    Plan  Insulin drip  Accu-Cheks  Hypoglycemic protocol    Lactic acid blood increased  Assessment & Plan  Lactic acid 3.9, 3.7  Patient is afebrile, vital stable, satting well on room air.  Examination without evidence of infection.  Chest x-ray unremarkable.    Plan  Trend lactic acid  Continue fluids.    Alcohol abuse  Assessment & Plan  Last alcohol intake unknown, ethanol level on admission less than 10.  Patient is drowsy and lethargic on examination.    Plan  Monitor for signs of withdrawal.  CIWA protocol    Thrombocytopenia (HCC)  Assessment & Plan  Platelet 138.  No evidence of bleeding.    Plan  Monitor CBC.  Check COVID/flu           VTE Pharmacologic Prophylaxis: VTE Score: 1 on Lovenox.  Code Status:  level 1-full code.  Discussion with family: Updated  (father) via phone.    Anticipated Length of Stay: Patient will be admitted on an inpatient basis with an anticipated length of stay of greater than 2 midnights secondary to HHS and seizure.    Chief Complaint: Altered mental status    History of Present Illness:  Wes Araujo is a 53 y.o. male with a PMH of DM-2, CKD, paroxysmal A-fib, chronic pancreatitis, alcohol abuse, illicit substance abuse who presents with altered mental status.  Patient appears drowsy and lethargic, does not answer any of the questions.  History was obtained from patient's father and the ED provider.  Prior to arrival patient had a syncopal episode in the store, according to patient's father patient was found unconscious on the floor. On arrival to ED, patient was lethargic and dry.   In the ED patient developed a witnessed generalized tonic-clonic seizure which lasted for about 3 to 4 minutes.  Seizure activity spontaneously resolved prior to administration of Ativan.   Patient's blood glucose was in 1000s on admission, no anion gap, BHB normal.  Has lactic acidosis.  VBG pH 7.2.  Patient is a heavy alcohol user.  Last alcohol intake unknown.  Patient does not have any tremulousness or sweating on examination.  No prior history of seizures or epilepsy.  Unclear if he had any fever, headache, nausea, vomiting or any other signs of infection at home.  Patient is lethargic and drowsy, but arousable.  Does not answer any of the questions or does not follow commands.  Looks dry on examination.  Lungs clear, no tenderness on abdominal palpation.  No CVA tenderness or suprapubic tenderness.  No evidence of open wounds or skin rashes.  Could not assess neck stiffness as patient is tightening his neck on examination.    Review of Systems:  Review of Systems   Unable to perform ROS: Mental status change       Past Medical and Surgical History:   Past Medical History:   Diagnosis Date    Alcohol abuse     Chronic pancreatitis (HCC)     Diabetes mellitus (HCC)     Type 2    Pancreatitis     Paroxysmal A-fib (HCC)     Thrombocytopenia (HCC)        Past Surgical History:   Procedure Laterality Date    INCISION AND DRAINAGE OF WOUND N/A 8/16/2020    Procedure: INCISION AND DRAINAGE (I&D) HEAD/FACE;  Surgeon: Estrada Walton DMD;  Location: [facility];  Service: Maxillofacial    IR BIOPSY BONE MARROW  2/7/2019    REMOVAL OF IMPACTED TOOTH - COMPLETELY BONY N/A 8/16/2020    Procedure: EXTRACTION TEETH MULTIPLE #2, 3;  Surgeon: Estrada Walton DMD;  Location: [facility];  Service: Maxillofacial       Meds/Allergies:  Prior to Admission medications    Medication Sig Start Date End Date Taking? Authorizing Provider   Blood Glucose Monitoring Suppl (Accu-Chek Guide) w/Device KIT Use 3 (three) times a day  Patient not  taking: Reported on 1/25/2024 9/4/22   Mojgan Young MD   glucose blood (OneTouch Verio) test strip May substitute brand based on insurance coverage. Check glucose TID.  Patient not taking: Reported on 1/25/2024 10/17/23   Isma Mejia MD   insulin lispro (HumaLOG KwikPen) 100 units/mL injection pen Inject 5 Units under the skin 3 (three) times a day with meals  Patient not taking: Reported on 1/25/2024 10/17/23 11/16/23  Isma Mejia MD   Insulin Pen Needle (BD Pen Needle Claudia 2nd Gen) 32G X 4 MM MISC For use with insulin pen. Pharmacy may dispense brand covered by insurance.  Patient not taking: Reported on 1/25/2024 10/17/23   Isma Mejia MD   Lancets (accu-chek multiclix) lancets Check blood sugar 3 times a day  Patient not taking: Reported on 1/25/2024 10/17/23   Isma Mejia MD   lidocaine (LIDODERM) 5 % Apply 1 patch topically over 12 hours daily Remove & Discard patch within 12 hours or as directed by MD  Patient not taking: Reported on 1/25/2024 2/3/23 3/5/23  Carol Lopez PA-C   thiamine (VITAMIN B1) 100 mg tablet Take 1 tablet (100 mg total) by mouth daily Do not start before January 3, 2023.  Patient not taking: Reported on 10/17/2023 1/3/23   Dami Orozco,    metFORMIN (GLUCOPHAGE) 500 mg tablet Take 1 tablet (500 mg total) by mouth 2 (two) times a day with meals 7/30/22 7/30/22  Phong Sy MD     I have been unable to obtain / verify an up to date medication list despite all reasonable attempts.    Allergies: No Known Allergies    Social History:  Marital Status: Single   Occupation:   Patient Pre-hospital Living Situation: Home  Patient Pre-hospital Level of Mobility: walks  Patient Pre-hospital Diet Restrictions:   Substance Use History:   Social History     Substance and Sexual Activity   Alcohol Use Yes     Social History     Tobacco Use   Smoking Status Former   Smokeless Tobacco Never     Social History     Substance and Sexual Activity   Drug Use Yes     "Types: Cocaine       Family History:  Family History   Problem Relation Age of Onset    Diabetes Mother     Hypertension Mother     Hyperlipidemia Father        Physical Exam:     Vitals:   Blood Pressure: 161/93 (01/25/24 1430)  Pulse: 81 (01/25/24 1430)  Temperature: 97.6 °F (36.4 °C) (01/25/24 1008)  Temp Source: Oral (01/25/24 1008)  Respirations: 21 (01/25/24 1430)  Height: 5' 9\" (175.3 cm) (01/25/24 1008)  Weight - Scale: 55 kg (121 lb 4.1 oz) (01/25/24 1008)  SpO2: 100 % (01/25/24 1430)    Physical Exam  Vitals and nursing note reviewed.   Constitutional:       General: He is not in acute distress.     Appearance: He is well-developed. He is ill-appearing.      Comments: Drowsy and lethargic   HENT:      Head: Normocephalic and atraumatic.      Mouth/Throat:      Comments: Dry mucous membranes  Eyes:      Conjunctiva/sclera: Conjunctivae normal.   Cardiovascular:      Rate and Rhythm: Normal rate and regular rhythm.      Heart sounds: No murmur heard.  Pulmonary:      Effort: Pulmonary effort is normal. No respiratory distress.      Breath sounds: Normal breath sounds.   Abdominal:      Palpations: Abdomen is soft.      Tenderness: There is no abdominal tenderness.   Musculoskeletal:         General: No swelling.      Cervical back: Neck supple.   Skin:     General: Skin is warm and dry.      Capillary Refill: Capillary refill takes less than 2 seconds.   Neurological:      Mental Status: He is lethargic.      Comments: Moves all 4 limbs spontaneously          Additional Data:     Lab Results:  Results from last 7 days   Lab Units 01/25/24  1010   WBC Thousand/uL 10.35*   HEMOGLOBIN g/dL 12.0   HEMATOCRIT % 35.1*   PLATELETS Thousands/uL 138*   NEUTROS PCT % 80*   LYMPHS PCT % 12*   MONOS PCT % 7   EOS PCT % 1     Results from last 7 days   Lab Units 01/25/24  1427 01/25/24  1258 01/25/24  1010   SODIUM mmol/L  --   --  124*   POTASSIUM mmol/L  --   --  3.6   CHLORIDE mmol/L  --   --  88*   CO2 mmol/L  --   " --  27   BUN mg/dL  --   --  8   CREATININE mg/dL  --   --  1.39*   ANION GAP mmol/L  --   --  9   CALCIUM mg/dL  --   --  8.6   ALBUMIN g/dL  --   --  3.6   TOTAL BILIRUBIN mg/dL  --   --  0.54   ALK PHOS U/L  --   --  103   ALT U/L  --   --  56*   AST U/L  --   --  32   GLUCOSE RANDOM mg/dL 345*   < > 1,128*    < > = values in this interval not displayed.         Results from last 7 days   Lab Units 01/25/24  1421 01/25/24  1258 01/25/24  1011   POC GLUCOSE mg/dl >500* >500* >500*         Results from last 7 days   Lab Units 01/25/24  1258 01/25/24  1038   LACTIC ACID mmol/L 3.7* 3.9*       Lines/Drains:  Invasive Devices       Peripheral Intravenous Line  Duration             Peripheral IV 01/25/24 Left Antecubital <1 day    Peripheral IV 01/25/24 Right;Upper Arm <1 day                        Imaging: Reviewed radiology reports from this admission including: chest xray  XR chest 1 view portable   Final Result by Mirza Gupta MD (01/25 1258)      No acute cardiopulmonary disease.                  Workstation performed: [id number]         CT head without contrast   Final Result by Marcos Kaye MD (01/25 1109)      No acute intracranial abnormality.                  Workstation performed: [id number]             EKG and Other Studies Reviewed on Admission:   EKG: NSR. HR 95.    ** Please Note: This note has been constructed using a voice recognition system. **

## 2024-01-25 NOTE — ASSESSMENT & PLAN NOTE
Platelet 138 on admission.  No evidence of bleeding.  Improving.  COVID/Flu negative    Plan  Monitor CBC.

## 2024-01-25 NOTE — ASSESSMENT & PLAN NOTE
Patient appears drowsy and lethargic.  Does not answer any of the questions or does not follow commands.  Ammonia WNR. BUN WNR.  Imaging does not show any acute changes.  Likely in the setting of hyperglycemia and hypercapnia.   TSH, folate is normal.    Plan  Maintain normoglycemia  Maintain normotension, normothermia  Monitor and correct electrolytes  Avoid constipation  Urinary retention protocol  Start thiamine  Follow-up vitamin B12  Would consider infectious workup if patient continues to have seizures or if encephalopathy does not improve with glucose correction.

## 2024-01-25 NOTE — ASSESSMENT & PLAN NOTE
Lab Results   Component Value Date    HGBA1C 10.2 (H) 2023       Recent Labs     24  1011 24  1258 24  1421   POCGLU >500* >500* >500*         Blood Sugar Average: Last 72 hrs:    Glucose on admission 1128, anion gap 9, bicarbonate 27, BHB negative.  pH VB.2  Patient is on insulin at home, did not take the dose last night according to father.   Unclear if he had any signs of infection/fever.    Plan  Discontinue fluids  Start diet  Start subcutaneous insulin  Monitor and replete electrolytes as needed  Accu-Cheks  Hypoglycemic protocol

## 2024-01-25 NOTE — ASSESSMENT & PLAN NOTE
Patient had a witnessed generalized tonic-clonic seizure in the ED. Patient was found unconscious on the floor prior to admission, unclear if he had any jerking movements.    No prior history of seizures/epilepsy.    Patient is an alcoholic. Use large amount of alcohol daily.  Last alcohol intake unknown.    Ethanol level on admission less than 10.  CT head unremarkable.    Plan  Neurochecks  Ativan as needed for seizures  May need lumbar puncture for infectious workup if patient continues to develop seizures  Urine drug screen: negative  Seizure precausions

## 2024-01-25 NOTE — ASSESSMENT & PLAN NOTE
Patient appears drowsy and lethargic.  Does not answer any of the questions or does not follow commands.  Likely in the setting of hyperglycemia.  Would consider infectious workup if patient continues to have seizures or if encephalopathy does not improve with glucose correction.  Imaging does not show any acute changes.    Plan  Maintain normoglycemia  Maintain normotension, normothermia  Monitor and correct electrolytes  Avoid constipation  Urinary retention protocol  Start thiamine  Follow-up TSH, vitamin B12, folate.

## 2024-01-25 NOTE — ED ATTENDING ATTESTATION
1/25/2024  I, Josh Summers MD, saw and evaluated the patient. I have discussed the patient with the resident/non-physician practitioner and agree with the resident's/non-physician practitioner's findings, Plan of Care, and MDM as documented in the resident's/non-physician practitioner's note, except where noted. All available labs and Radiology studies were reviewed.  I was present for key portions of any procedure(s) performed by the resident/non-physician practitioner and I was immediately available to provide assistance.       At this point I agree with the current assessment done in the Emergency Department.  I have conducted an independent evaluation of this patient a history and physical is as follows:    54 YO male presenting with altered mental status. Assisted with evaluation and management when PA was called to the room due to active seizure activity. Patient unable to provide history, actively seizing on arrival to room but appears to spontaneously resolve at moment Ativan given. Seems patient does not have a history of seizure, he was found altered outside, EMS noted a BG >500. Electrolytes will be checked to rule out possible DKA, CBC for leukocytosis, lactic acid.     ED Course         Critical Care Time  Procedures

## 2024-01-25 NOTE — ASSESSMENT & PLAN NOTE
Lactic acid 3.9, 3.7  Patient is afebrile, vital stable, satting well on room air.  Examination without evidence of infection.  Chest x-ray unremarkable.    Plan  Trend lactic acid  Continue fluids.

## 2024-01-25 NOTE — ED PROVIDER NOTES
History  Chief Complaint   Patient presents with    Altered Mental Status     Pt arrives via EMS, was found unconscious at a [place], per EMS unconscious for 5 minutes after their arrival, blood glucose >500, then became aggressive towards EMS. Arrived in restraints     Wes is a 53-year-old male, history of alcohol abuse, polysubstance abuse, poorly controlled diabetes, A-fib, presenting via EMS with altered mental status.  The patient was reportedly seen in a [place] where he collapsed, no shaking/seizure activity was reported at that time.  By time arrival to the emergency department he is awake, alerts to voice, but appears lethargic.  Patient is unable to provide further history despite translation.      History provided by:  Patient and EMS personnel  History limited by:  Mental status change   used: Yes        Prior to Admission Medications   Prescriptions Last Dose Informant Patient Reported? Taking?   Blood Glucose Monitoring Suppl (Accu-Chek Guide) w/Device KIT Not Taking  No No   Sig: Use 3 (three) times a day   Patient not taking: Reported on 1/25/2024   Insulin Pen Needle (BD Pen Needle Claudia 2nd Gen) 32G X 4 MM MISC Not Taking  No No   Sig: For use with insulin pen. Pharmacy may dispense brand covered by insurance.   Patient not taking: Reported on 1/25/2024   Lancets (accu-chek multiclix) lancets Not Taking  No No   Sig: Check blood sugar 3 times a day   Patient not taking: Reported on 1/25/2024   glucose blood (OneTouch Verio) test strip Not Taking  No No   Sig: May substitute brand based on insurance coverage. Check glucose TID.   Patient not taking: Reported on 1/25/2024   insulin lispro (HumaLOG KwikPen) 100 units/mL injection pen Not Taking  No No   Sig: Inject 5 Units under the skin 3 (three) times a day with meals   Patient not taking: Reported on 1/25/2024   lidocaine (LIDODERM) 5 % Not Taking  No No   Sig: Apply 1 patch topically over 12 hours daily Remove & Discard patch  within 12 hours or as directed by MD   Patient not taking: Reported on 1/25/2024   thiamine (VITAMIN B1) 100 mg tablet Not Taking  No No   Sig: Take 1 tablet (100 mg total) by mouth daily Do not start before January 3, 2023.   Patient not taking: Reported on 10/17/2023      Facility-Administered Medications: None       Past Medical History:   Diagnosis Date    Alcohol abuse     Chronic pancreatitis (HCC)     Diabetes mellitus (HCC)     Type 2    Pancreatitis     Paroxysmal A-fib (HCC)     Thrombocytopenia (HCC)        Past Surgical History:   Procedure Laterality Date    INCISION AND DRAINAGE OF WOUND N/A 8/16/2020    Procedure: INCISION AND DRAINAGE (I&D) HEAD/FACE;  Surgeon: Estrada Walton DMD;  Location: [facility];  Service: Maxillofacial    IR BIOPSY BONE MARROW  2/7/2019    REMOVAL OF IMPACTED TOOTH - COMPLETELY BONY N/A 8/16/2020    Procedure: EXTRACTION TEETH MULTIPLE #2, 3;  Surgeon: Estrada Walton DMD;  Location: [facility];  Service: Maxillofacial       Family History   Problem Relation Age of Onset    Diabetes Mother     Hypertension Mother     Hyperlipidemia Father      I have reviewed and agree with the history as documented.    E-Cigarette/Vaping     E-Cigarette/Vaping Substances     Social History     Tobacco Use    Smoking status: Former    Smokeless tobacco: Never   Substance Use Topics    Alcohol use: Yes    Drug use: Yes     Types: Cocaine       Review of Systems   Unable to perform ROS: Mental status change       Physical Exam  Physical Exam  Constitutional:       General: He is not in acute distress.     Appearance: He is well-developed. He is ill-appearing. He is not diaphoretic.      Comments: Patient does alert to voice.  He appears lethargic.   HENT:      Head: Normocephalic and atraumatic.      Right Ear: External ear normal.      Left Ear: External ear normal.      Mouth/Throat:      Mouth: Mucous membranes are dry.   Eyes:      Extraocular Movements:      Right eye: Normal extraocular  motion.      Left eye: Normal extraocular motion.      Conjunctiva/sclera: Conjunctivae normal.      Pupils: Pupils are equal, round, and reactive to light.      Comments: Normal pupillary size   Cardiovascular:      Rate and Rhythm: Normal rate and regular rhythm.   Pulmonary:      Effort: Pulmonary effort is normal. No accessory muscle usage or respiratory distress.      Breath sounds: No wheezing or rales.   Abdominal:      General: Abdomen is flat. There is no distension.      Tenderness: There is no abdominal tenderness.   Musculoskeletal:      Cervical back: Normal range of motion. No rigidity.   Skin:     General: Skin is warm and dry.      Capillary Refill: Capillary refill takes less than 2 seconds.      Findings: No erythema or rash.   Neurological:      Mental Status: He is lethargic.      Motor: No abnormal muscle tone.      Coordination: Coordination normal.      Comments: Pt able to tell me name when prompted. Disoriented to place/time. Moves all four extremities spontaneously   Psychiatric:         Behavior: Behavior normal.         Thought Content: Thought content normal.         Judgment: Judgment normal.         Vital Signs  ED Triage Vitals [01/25/24 1008]   Temperature Pulse Respirations Blood Pressure SpO2   97.6 °F (36.4 °C) 97 22 (!) 220/98 96 %      Temp Source Heart Rate Source Patient Position - Orthostatic VS BP Location FiO2 (%)   Oral -- -- -- --      Pain Score       No Pain           Vitals:    01/25/24 1330 01/25/24 1338 01/25/24 1400 01/25/24 1430   BP:  130/82 156/93 161/93   Pulse: 87 92 84 81         Visual Acuity  Visual Acuity      Flowsheet Row Most Recent Value   L Pupil Size (mm) 4   R Pupil Size (mm) 4            ED Medications  Medications   sodium chloride (PF) 0.9 % injection 3 mL (has no administration in time range)   insulin regular (HumuLIN R,NovoLIN R) 1 Units/mL in sodium chloride 0.9 % 100 mL infusion (1.38 Units/hr Intravenous Rate/Dose Change 1/25/24 1551)    thiamine (VITAMIN B1) 100 mg in sodium chloride 0.9 % 50 mL IVPB (has no administration in time range)   senna (SENOKOT) tablet 8.6 mg (has no administration in time range)   lactated ringers bolus 1,000 mL (has no administration in time range)   lactated ringers infusion (has no administration in time range)   hydrALAZINE (APRESOLINE) injection 5 mg (has no administration in time range)   sodium chloride 0.9 % bolus 1,000 mL (0 mL Intravenous Stopped 1/25/24 1255)   levETIRAcetam (KEPPRA) injection 1,500 mg (1,500 mg Intravenous Given 1/25/24 1049)   LORazepam (ATIVAN) injection 2 mg (2 mg Intravenous Given 1/25/24 1040)   potassium chloride 20 mEq IVPB (premix) (0 mEq Intravenous Stopped 1/25/24 1400)       Diagnostic Studies  Results Reviewed       Procedure Component Value Units Date/Time    Vitamin D 25 hydroxy [[id number]] Collected: 01/25/24 1258    Lab Status: In process Specimen: Blood from Hand, Right Updated: 01/25/24 1613    Magnesium [[id number]] Collected: 01/25/24 1427    Lab Status: In process Specimen: Blood from Arm, Left Updated: 01/25/24 1613    TSH, 3rd generation with Free T4 reflex [[id number]] Collected: 01/25/24 1427    Lab Status: In process Specimen: Blood from Arm, Left Updated: 01/25/24 1611    Rapid drug screen, urine [[id number]]     Lab Status: No result Specimen: Urine     Basic metabolic panel [[id number]]     Lab Status: No result Specimen: Blood     Calcium, ionized [[id number]]     Lab Status: No result Specimen: Blood     COVID/FLU/RSV [[id number]]     Lab Status: No result Specimen: Nares from Nose     Basic metabolic panel [[id number]]     Lab Status: No result Specimen: Blood     Vitamin B12 [[id number]]     Lab Status: No result Specimen: Blood     Folate [[id number]]     Lab Status: No result Specimen: Blood     Vitamin B1, whole blood [[id number]]     Lab Status: No result Specimen: Blood     Glucose, random [[id number]]  (Abnormal) Collected: 01/25/24 1427    Lab Status: Final  result Specimen: Blood from Arm, Left Updated: 01/25/24 1540     Glucose 345 mg/dL     Urinalysis with microscopic [[id number]]     Lab Status: No result Specimen: Urine     Fingerstick Glucose (POCT) [[id number]]  (Abnormal) Collected: 01/25/24 1421    Lab Status: Final result Updated: 01/25/24 1429     POC Glucose >500 mg/dl     HS Troponin I 2hr [[id number]]  (Normal) Collected: 01/25/24 1258    Lab Status: Final result Specimen: Blood from Hand, Right Updated: 01/25/24 1329     hs TnI 2hr 9 ng/L      Delta 2hr hsTnI 1 ng/L     Lactic acid 2 Hours [[id number]]  (Abnormal) Collected: 01/25/24 1258    Lab Status: Final result Specimen: Blood from Hand, Right Updated: 01/25/24 1327     LACTIC ACID 3.7 mmol/L     Narrative:      Result may be elevated if tourniquet was used during collection.    Glucose, random [[id number]]  (Abnormal) Collected: 01/25/24 1258    Lab Status: Final result Specimen: Blood from Hand, Right Updated: 01/25/24 1326     Glucose 740 mg/dL     Fingerstick Glucose (POCT) [[id number]]  (Abnormal) Collected: 01/25/24 1258    Lab Status: Final result Updated: 01/25/24 1303     POC Glucose >500 mg/dl     Lactic acid, plasma (w/reflex if result > 2.0) [[id number]]  (Abnormal) Collected: 01/25/24 1038    Lab Status: Final result Specimen: Blood from Arm, Right Updated: 01/25/24 1108     LACTIC ACID 3.9 mmol/L     Narrative:      Result may be elevated if tourniquet was used during collection.    Ammonia [[id number]]  (Normal) Collected: 01/25/24 1038    Lab Status: Final result Specimen: Blood from Arm, Right Updated: 01/25/24 1106     Ammonia 31 umol/L     Ethanol [[id number]]  (Normal) Collected: 01/25/24 1038    Lab Status: Final result Specimen: Blood from Arm, Right Updated: 01/25/24 1105     Ethanol Lvl <10 mg/dL     Comprehensive metabolic panel [[id number]]  (Abnormal) Collected: 01/25/24 1010    Lab Status: Final result Specimen: Blood from Arm, Right Updated: 01/25/24 1101     Sodium 124 mmol/L       Potassium 3.6 mmol/L      Chloride 88 mmol/L      CO2 27 mmol/L      ANION GAP 9 mmol/L      BUN 8 mg/dL      Creatinine 1.39 mg/dL      Glucose 1,128 mg/dL      Calcium 8.6 mg/dL      AST 32 U/L      ALT 56 U/L      Alkaline Phosphatase 103 U/L      Total Protein 6.4 g/dL      Albumin 3.6 g/dL      Total Bilirubin 0.54 mg/dL      eGFR 57 ml/min/1.73sq m     Narrative:      [employer] guidelines for Chronic Kidney Disease (CKD):     Stage 1 with normal or high GFR (GFR > 90 mL/min/1.73 square meters)    Stage 2 Mild CKD (GFR = 60-89 mL/min/1.73 square meters)    Stage 3A Moderate CKD (GFR = 45-59 mL/min/1.73 square meters)    Stage 3B Moderate CKD (GFR = 30-44 mL/min/1.73 square meters)    Stage 4 Severe CKD (GFR = 15-29 mL/min/1.73 square meters)    Stage 5 End Stage CKD (GFR <15 mL/min/1.73 square meters)  Note: GFR calculation is accurate only with a steady state creatinine    Beta Hydroxybutyrate [[id number]]  (Normal) Collected: 01/25/24 1038    Lab Status: Final result Specimen: Blood from Arm, Right Updated: 01/25/24 1057     BETA-HYDROXYBUTYRATE 0.2 mmol/L     Blood gas, venous [[id number]]  (Abnormal) Collected: 01/25/24 1038    Lab Status: Final result Specimen: Blood from Arm, Right Updated: 01/25/24 1051     pH, Sami 7.219     pCO2, Sami 60.5 mm Hg      pO2, Sami 30.7 mm Hg      HCO3, Sami 24.1 mmol/L      Base Excess, Sami -4.4 mmol/L      O2 Content, Sami 9.9 ml/dL      O2 HGB, VENOUS 54.4 %     HS Troponin 0hr (reflex protocol) [[id number]]  (Normal) Collected: 01/25/24 1010    Lab Status: Final result Specimen: Blood from Arm, Right Updated: 01/25/24 1047     hs TnI 0hr 8 ng/L     CBC and differential [[id number]]  (Abnormal) Collected: 01/25/24 1010    Lab Status: Final result Specimen: Blood from Arm, Right Updated: 01/25/24 1025     WBC 10.35 Thousand/uL      RBC 3.87 Million/uL      Hemoglobin 12.0 g/dL      Hematocrit 35.1 %      MCV 91 fL      MCH 31.0 pg      MCHC 34.2 g/dL       RDW 11.1 %      MPV 12.4 fL      Platelets 138 Thousands/uL      nRBC 0 /100 WBCs      Neutrophils Relative 80 %      Immat GRANS % 0 %      Lymphocytes Relative 12 %      Monocytes Relative 7 %      Eosinophils Relative 1 %      Basophils Relative 0 %      Neutrophils Absolute 8.27 Thousands/µL      Immature Grans Absolute 0.04 Thousand/uL      Lymphocytes Absolute 1.20 Thousands/µL      Monocytes Absolute 0.69 Thousand/µL      Eosinophils Absolute 0.13 Thousand/µL      Basophils Absolute 0.02 Thousands/µL     Fingerstick Glucose (POCT) [[id number]]  (Abnormal) Collected: 01/25/24 1011    Lab Status: Final result Updated: 01/25/24 1013     POC Glucose >500 mg/dl                    XR chest 1 view portable   Final Result by Mirza Gupta MD (01/25 1258)      No acute cardiopulmonary disease.                  Workstation performed: [id number]         CT head without contrast   Final Result by Marcos Kaye MD (01/25 1109)      No acute intracranial abnormality.                  Workstation performed: [id number]                    Procedures  ECG 12 Lead Documentation Only    Date/Time: 1/25/2024 10:15 AM    Performed by: Scotty Gomez PA-C  Authorized by: Scotty Gomez PA-C    Indications / Diagnosis:  AMS  ECG reviewed by me, the ED Provider: yes    Patient location:  ED  Interpretation:     Interpretation: non-specific    Rate:     ECG rate:  95    ECG rate assessment: normal    Rhythm:     Rhythm: sinus rhythm    Ectopy:     Ectopy: none    QRS:     QRS axis:  Normal    QRS intervals:  Normal  Conduction:     Conduction: normal    ST segments:     ST segments:  Normal  T waves:     T waves: non-specific    Other findings:     Other findings: prolonged qTc interval      Other findings comment:  QTc 495           ED Course  ED Course as of 01/25/24 1622   u Jan 25, 2024   1045 Episode of seizure activity lasting approximately 3-5 minutes witnessed by myself/RN. Post-ictal period noted  afterwards where pt appeared lethargic, gradually returned to baseline level present on arrival. Given 2 mg ativan just prior to cessation of seizure although given short interval likely resolved spontaneously.    1110 GLUCOSE(!!): 1,128  Will start on insulin infusion                               SBIRT 22yo+      Flowsheet Row Most Recent Value   Initial Alcohol Screen: US AUDIT-C     1. How often do you have a drink containing alcohol? 3 Filed at: 01/25/2024 1029   2. How many drinks containing alcohol do you have on a typical day you are drinking?  3 Filed at: 01/25/2024 1029   3a. Male UNDER 65: How often do you have five or more drinks on one occasion? 0 Filed at: 01/25/2024 1029   3b. FEMALE Any Age, or MALE 65+: How often do you have 4 or more drinks on one occassion? 0 Filed at: 01/25/2024 1029   Audit-C Score 6 Filed at: 01/25/2024 1029   MUSA: How many times in the past year have you...    Used an illegal drug or used a prescription medication for non-medical reasons? Never Filed at: 01/25/2024 1029                      Medical Decision Making  Patient presents with EMS with altered mental status, episode of apparent syncope/loss consciousness.  On arrival he is awake and does alert to voice, appears lethargic and is only able to tell me his name.  He appears to move all 4 extremities spontaneously.  He does not have any diaphoresis, shaking to suggest alcohol withdrawal symptoms.  Unclear last alcohol use.  Pupils appear normal in size.  He does appear very dry clinically.    Shortly after arrival patient had an episode of seizure activity with generalized shaking, incontinence, and subsequent postictal phase.  This appeared to resolve spontaneously as it broke as Ativan was being given.  Patient loaded with dose of Keppra subsequently.  No further seizure activity during time in the emergency department.    Patient noted to be significantly hyperglycemic, does not appear to be in DKA given absence of  anion gap, negative beta hydroxybutyrate.  Mild acidosis by VBG but no severe acidosis to explain altered mental status.  HHS picture possible given significant hyperglycemia and absence of DKA, associated neurologic symptoms/or mental status.  Given IV fluid hydration here, started on insulin drip/potassium.    Patient does appear to have had a gradual improvement in mental status during time in the emergency department, although does remain somewhat confused by time of admission.  Following discussion with ICU, will admit to service of ICU for further treatment and monitoring.    Amount and/or Complexity of Data Reviewed  Labs: ordered. Decision-making details documented in ED Course.  Radiology: ordered.    Risk  Prescription drug management.             Disposition  Final diagnoses:   Altered mental status   Seizure (HCC)   Hyperglycemia   Dehydration     Time reflects when diagnosis was documented in both MDM as applicable and the Disposition within this note       Time User Action Codes Description Comment    1/25/2024  4:08 PM Scotty Gomez [R41.82] Altered mental status     1/25/2024  4:08 PM Scotty Gomez Add [R56.9] Seizure (HCC)     1/25/2024  4:08 PM Scotty Gomez [R73.9] Hyperglycemia     1/25/2024  4:08 PM Scotty Gomez Add [E86.0] Dehydration           ED Disposition       ED Disposition   Admit    Condition   Stable    Date/Time   u Jan 25, 2024 1601    Comment   Case was discussed with Toshia Pichardo and the patient's admission status was agreed to be Admission Status: inpatient status to the service of Dr. Payton.               Follow-up Information    None         Patient's Medications   Discharge Prescriptions    No medications on file       No discharge procedures on file.    PDMP Review         Value Time User    PDMP Reviewed  Yes 8/18/2020 11:39 AM Anthony Avila MD            ED Provider  Electronically Signed by             Scotty Gomez PA-C  01/25/24  2264

## 2024-01-25 NOTE — ASSESSMENT & PLAN NOTE
Last alcohol intake unknown, ethanol level on admission less than 10.  Patient is drowsy and lethargic on examination.    Plan  Monitor for signs of withdrawal.  [facility] protocol

## 2024-01-25 NOTE — ASSESSMENT & PLAN NOTE
Lactic acid 3.9, 3.7, 2.8  Patient is afebrile, vital stable, satting well on room air.  Examination without evidence of infection.  Chest x-ray unremarkable. UA positive leukocytes, but no nitrites or bacteria.

## 2024-01-25 NOTE — ASSESSMENT & PLAN NOTE
Patient had a witnessed generalized tonic-clonic seizure in the ED. Patient was found unconscious on the floor prior to admission, unclear if he had any jerking movements.    No prior history of seizures/epilepsy.    Patient is an alcoholic. Use large amount of alcohol daily.  Last alcohol intake unknown.    Ethanol level on admission less than 10.  CT head unremarkable.    Plan  EEG  Neurochecks  Ativan as needed for seizures  May need lumbar puncture for infectious workup if patient continues to develop seizures  Urine drug screen  Seizure precausions

## 2024-01-25 NOTE — ASSESSMENT & PLAN NOTE
Last alcohol intake unknown, ethanol level on admission less than 10.  Patient is drowsy and lethargic on examination.    Plan  Monitor for signs of withdrawal.  [facility] protocol

## 2024-01-25 NOTE — ASSESSMENT & PLAN NOTE
Lab Results   Component Value Date    HGBA1C 10.2 (H) 01/30/2023       Recent Labs     01/25/24  1011 01/25/24  1258 01/25/24  1421   POCGLU >500* >500* >500*         Blood Sugar Average: Last 72 hrs:    Patient is on insulin at home, unclear of the regimen.    Plan  Continue subcutaneous insulin  Accu-Cheks  Hypoglycemic protocol

## 2024-01-25 NOTE — ED NOTES
Contacted patients father Wes Araujo per patient request. Father reports patient was at the [place]ode[place] sitting in chair, reading a text message, lost consciousness and remained unconscious for 5-10 minutes, there was no seizure like activity per the bystanders. Father also reports patient is non compliant with diabetes medications including insulin. Patient father to be called back when disposition is reached.      Ravi Mccauley RN  01/25/24 [id number]

## 2024-01-26 ENCOUNTER — TELEPHONE (OUTPATIENT)
Dept: NEPHROLOGY | Facility: CLINIC | Age: 54
End: 2024-01-26

## 2024-01-26 PROBLEM — E11.00 HYPEROSMOLAR HYPERGLYCEMIC STATE (HHS) (HCC): Status: RESOLVED | Noted: 2024-01-25 | Resolved: 2024-01-26

## 2024-01-26 PROBLEM — G93.40 ENCEPHALOPATHY: Status: RESOLVED | Noted: 2024-01-25 | Resolved: 2024-01-26

## 2024-01-26 PROBLEM — N17.9 AKI (ACUTE KIDNEY INJURY) (HCC): Status: RESOLVED | Noted: 2019-02-02 | Resolved: 2024-01-26

## 2024-01-26 LAB
AMPHETAMINES SERPL QL SCN: NEGATIVE
ANION GAP SERPL CALCULATED.3IONS-SCNC: 3 MMOL/L
ANION GAP SERPL CALCULATED.3IONS-SCNC: 4 MMOL/L
ANION GAP SERPL CALCULATED.3IONS-SCNC: 4 MMOL/L
ANION GAP SERPL CALCULATED.3IONS-SCNC: 5 MMOL/L
ANION GAP SERPL CALCULATED.3IONS-SCNC: 6 MMOL/L
BACTERIA UR QL AUTO: ABNORMAL /HPF
BARBITURATES UR QL: NEGATIVE
BASE EX.OXY STD BLDV CALC-SCNC: 56.6 % (ref 60–80)
BASE EXCESS BLDV CALC-SCNC: -0.9 MMOL/L
BASOPHILS # BLD AUTO: 0.02 THOUSANDS/ÂΜL (ref 0–0.1)
BASOPHILS NFR BLD AUTO: 0 % (ref 0–1)
BENZODIAZ UR QL: NEGATIVE
BILIRUB UR QL STRIP: NEGATIVE
BUN SERPL-MCNC: 10 MG/DL (ref 5–25)
BUN SERPL-MCNC: 12 MG/DL (ref 5–25)
BUN SERPL-MCNC: 7 MG/DL (ref 5–25)
BUN SERPL-MCNC: 8 MG/DL (ref 5–25)
BUN SERPL-MCNC: 8 MG/DL (ref 5–25)
CA-I BLD-SCNC: 1.22 MMOL/L (ref 1.12–1.32)
CALCIUM SERPL-MCNC: 7.9 MG/DL (ref 8.4–10.2)
CALCIUM SERPL-MCNC: 8.2 MG/DL (ref 8.4–10.2)
CALCIUM SERPL-MCNC: 8.6 MG/DL (ref 8.4–10.2)
CALCIUM SERPL-MCNC: 8.7 MG/DL (ref 8.4–10.2)
CALCIUM SERPL-MCNC: 8.9 MG/DL (ref 8.4–10.2)
CHLORIDE SERPL-SCNC: 102 MMOL/L (ref 96–108)
CHLORIDE SERPL-SCNC: 104 MMOL/L (ref 96–108)
CHLORIDE SERPL-SCNC: 104 MMOL/L (ref 96–108)
CHLORIDE SERPL-SCNC: 105 MMOL/L (ref 96–108)
CHLORIDE SERPL-SCNC: 105 MMOL/L (ref 96–108)
CLARITY UR: CLEAR
CO2 SERPL-SCNC: 23 MMOL/L (ref 21–32)
CO2 SERPL-SCNC: 26 MMOL/L (ref 21–32)
CO2 SERPL-SCNC: 27 MMOL/L (ref 21–32)
CO2 SERPL-SCNC: 28 MMOL/L (ref 21–32)
CO2 SERPL-SCNC: 29 MMOL/L (ref 21–32)
COCAINE UR QL: NEGATIVE
COLOR UR: YELLOW
CREAT SERPL-MCNC: 0.86 MG/DL (ref 0.6–1.3)
CREAT SERPL-MCNC: 0.86 MG/DL (ref 0.6–1.3)
CREAT SERPL-MCNC: 0.9 MG/DL (ref 0.6–1.3)
CREAT SERPL-MCNC: 0.9 MG/DL (ref 0.6–1.3)
CREAT SERPL-MCNC: 1.09 MG/DL (ref 0.6–1.3)
EOSINOPHIL # BLD AUTO: 0.22 THOUSAND/ÂΜL (ref 0–0.61)
EOSINOPHIL NFR BLD AUTO: 2 % (ref 0–6)
ERYTHROCYTE [DISTWIDTH] IN BLOOD BY AUTOMATED COUNT: 10.5 % (ref 11.6–15.1)
EST. AVERAGE GLUCOSE BLD GHB EST-MCNC: 404 MG/DL
GFR SERPL CREATININE-BSD FRML MDRD: 77 ML/MIN/1.73SQ M
GFR SERPL CREATININE-BSD FRML MDRD: 97 ML/MIN/1.73SQ M
GFR SERPL CREATININE-BSD FRML MDRD: 97 ML/MIN/1.73SQ M
GFR SERPL CREATININE-BSD FRML MDRD: 99 ML/MIN/1.73SQ M
GFR SERPL CREATININE-BSD FRML MDRD: 99 ML/MIN/1.73SQ M
GLUCOSE SERPL-MCNC: 144 MG/DL (ref 65–140)
GLUCOSE SERPL-MCNC: 160 MG/DL (ref 65–140)
GLUCOSE SERPL-MCNC: 183 MG/DL (ref 65–140)
GLUCOSE SERPL-MCNC: 195 MG/DL (ref 65–140)
GLUCOSE SERPL-MCNC: 200 MG/DL (ref 65–140)
GLUCOSE SERPL-MCNC: 203 MG/DL (ref 65–140)
GLUCOSE SERPL-MCNC: 217 MG/DL (ref 65–140)
GLUCOSE SERPL-MCNC: 219 MG/DL (ref 65–140)
GLUCOSE SERPL-MCNC: 326 MG/DL (ref 65–140)
GLUCOSE SERPL-MCNC: 347 MG/DL (ref 65–140)
GLUCOSE UR STRIP-MCNC: ABNORMAL MG/DL
HBA1C MFR BLD: 15.7 %
HCO3 BLDV-SCNC: 25.6 MMOL/L (ref 24–30)
HCT VFR BLD AUTO: 31.8 % (ref 36.5–49.3)
HGB BLD-MCNC: 11.5 G/DL (ref 12–17)
HGB UR QL STRIP.AUTO: NEGATIVE
HYALINE CASTS #/AREA URNS LPF: ABNORMAL /LPF
IMM GRANULOCYTES # BLD AUTO: 0.04 THOUSAND/UL (ref 0–0.2)
IMM GRANULOCYTES NFR BLD AUTO: 0 % (ref 0–2)
IPAP: 12
KETONES UR STRIP-MCNC: NEGATIVE MG/DL
LEUKOCYTE ESTERASE UR QL STRIP: NEGATIVE
LYMPHOCYTES # BLD AUTO: 2.73 THOUSANDS/ÂΜL (ref 0.6–4.47)
LYMPHOCYTES NFR BLD AUTO: 30 % (ref 14–44)
MAGNESIUM SERPL-MCNC: 1.9 MG/DL (ref 1.9–2.7)
MCH RBC QN AUTO: 30.9 PG (ref 26.8–34.3)
MCHC RBC AUTO-ENTMCNC: 36.2 G/DL (ref 31.4–37.4)
MCV RBC AUTO: 86 FL (ref 82–98)
METHADONE UR QL: NEGATIVE
MONOCYTES # BLD AUTO: 0.55 THOUSAND/ÂΜL (ref 0.17–1.22)
MONOCYTES NFR BLD AUTO: 6 % (ref 4–12)
MUCOUS THREADS UR QL AUTO: ABNORMAL
NEUTROPHILS # BLD AUTO: 5.63 THOUSANDS/ÂΜL (ref 1.85–7.62)
NEUTS SEG NFR BLD AUTO: 62 % (ref 43–75)
NITRITE UR QL STRIP: NEGATIVE
NON VENT- BIPAP: ABNORMAL
NON-SQ EPI CELLS URNS QL MICRO: ABNORMAL /HPF
NRBC BLD AUTO-RTO: 0 /100 WBCS
O2 CT BLDV-SCNC: 9.5 ML/DL
OPIATES UR QL SCN: NEGATIVE
OXYCODONE+OXYMORPHONE UR QL SCN: NEGATIVE
PCO2 BLDV: 50.3 MM HG (ref 42–50)
PCP UR QL: NEGATIVE
PEEP MAX SETTING VENT: 6 CM[H2O]
PH BLDV: 7.32 [PH] (ref 7.3–7.4)
PH UR STRIP.AUTO: 6.5 [PH]
PHOSPHATE SERPL-MCNC: 1.6 MG/DL (ref 2.7–4.5)
PLATELET # BLD AUTO: 142 THOUSANDS/UL (ref 149–390)
PLATELET # BLD AUTO: 154 THOUSANDS/UL (ref 149–390)
PMV BLD AUTO: 11.9 FL (ref 8.9–12.7)
PMV BLD AUTO: 11.9 FL (ref 8.9–12.7)
PO2 BLDV: 28.1 MM HG (ref 35–45)
POTASSIUM SERPL-SCNC: 3.4 MMOL/L (ref 3.5–5.3)
POTASSIUM SERPL-SCNC: 3.4 MMOL/L (ref 3.5–5.3)
POTASSIUM SERPL-SCNC: 3.7 MMOL/L (ref 3.5–5.3)
POTASSIUM SERPL-SCNC: 3.8 MMOL/L (ref 3.5–5.3)
POTASSIUM SERPL-SCNC: 3.9 MMOL/L (ref 3.5–5.3)
PROT UR STRIP-MCNC: ABNORMAL MG/DL
RBC # BLD AUTO: 3.72 MILLION/UL (ref 3.88–5.62)
RBC #/AREA URNS AUTO: ABNORMAL /HPF
SODIUM SERPL-SCNC: 131 MMOL/L (ref 135–147)
SODIUM SERPL-SCNC: 134 MMOL/L (ref 135–147)
SODIUM SERPL-SCNC: 135 MMOL/L (ref 135–147)
SODIUM SERPL-SCNC: 136 MMOL/L (ref 135–147)
SODIUM SERPL-SCNC: 139 MMOL/L (ref 135–147)
SP GR UR STRIP.AUTO: 1.02 (ref 1–1.03)
THC UR QL: NEGATIVE
UROBILINOGEN UR STRIP-ACNC: <2 MG/DL
VENT BIPAP FIO2: 40 %
VIT B12 SERPL-MCNC: 1081 PG/ML (ref 180–914)
WBC # BLD AUTO: 9.19 THOUSAND/UL (ref 4.31–10.16)
WBC #/AREA URNS AUTO: ABNORMAL /HPF

## 2024-01-26 PROCEDURE — 83036 HEMOGLOBIN GLYCOSYLATED A1C: CPT | Performed by: INTERNAL MEDICINE

## 2024-01-26 PROCEDURE — 85025 COMPLETE CBC W/AUTO DIFF WBC: CPT | Performed by: INTERNAL MEDICINE

## 2024-01-26 PROCEDURE — 99232 SBSQ HOSP IP/OBS MODERATE 35: CPT | Performed by: INTERNAL MEDICINE

## 2024-01-26 PROCEDURE — 82948 REAGENT STRIP/BLOOD GLUCOSE: CPT

## 2024-01-26 PROCEDURE — 80048 BASIC METABOLIC PNL TOTAL CA: CPT | Performed by: INTERNAL MEDICINE

## 2024-01-26 PROCEDURE — 94003 VENT MGMT INPAT SUBQ DAY: CPT

## 2024-01-26 PROCEDURE — 82330 ASSAY OF CALCIUM: CPT | Performed by: INTERNAL MEDICINE

## 2024-01-26 PROCEDURE — 83735 ASSAY OF MAGNESIUM: CPT | Performed by: INTERNAL MEDICINE

## 2024-01-26 PROCEDURE — 81001 URINALYSIS AUTO W/SCOPE: CPT | Performed by: INTERNAL MEDICINE

## 2024-01-26 PROCEDURE — 80307 DRUG TEST PRSMV CHEM ANLYZR: CPT | Performed by: INTERNAL MEDICINE

## 2024-01-26 PROCEDURE — 84100 ASSAY OF PHOSPHORUS: CPT | Performed by: INTERNAL MEDICINE

## 2024-01-26 PROCEDURE — 82805 BLOOD GASES W/O2 SATURATION: CPT | Performed by: INTERNAL MEDICINE

## 2024-01-26 RX ORDER — LANOLIN ALCOHOL/MO/W.PET/CERES
400 CREAM (GRAM) TOPICAL DAILY
Status: DISCONTINUED | OUTPATIENT
Start: 2024-01-26 | End: 2024-01-27 | Stop reason: HOSPADM

## 2024-01-26 RX ORDER — AMLODIPINE BESYLATE 2.5 MG/1
2.5 TABLET ORAL DAILY
Status: DISCONTINUED | OUTPATIENT
Start: 2024-01-26 | End: 2024-01-26

## 2024-01-26 RX ORDER — LISINOPRIL 10 MG/1
10 TABLET ORAL DAILY
Status: DISCONTINUED | OUTPATIENT
Start: 2024-01-26 | End: 2024-01-27 | Stop reason: HOSPADM

## 2024-01-26 RX ORDER — LANOLIN ALCOHOL/MO/W.PET/CERES
100 CREAM (GRAM) TOPICAL DAILY
Status: DISCONTINUED | OUTPATIENT
Start: 2024-01-27 | End: 2024-01-27 | Stop reason: HOSPADM

## 2024-01-26 RX ORDER — OMEGA-3S/DHA/EPA/FISH OIL/D3 300MG-1000
400 CAPSULE ORAL DAILY
Status: DISCONTINUED | OUTPATIENT
Start: 2024-01-26 | End: 2024-01-27 | Stop reason: HOSPADM

## 2024-01-26 RX ADMIN — AMLODIPINE BESYLATE 2.5 MG: 2.5 TABLET ORAL at 10:11

## 2024-01-26 RX ADMIN — CHLORHEXIDINE GLUCONATE 15 ML: 1.2 RINSE ORAL at 21:36

## 2024-01-26 RX ADMIN — INSULIN LISPRO 5 UNITS: 100 INJECTION, SOLUTION INTRAVENOUS; SUBCUTANEOUS at 16:12

## 2024-01-26 RX ADMIN — ENOXAPARIN SODIUM 40 MG: 40 INJECTION SUBCUTANEOUS at 08:00

## 2024-01-26 RX ADMIN — CHLORHEXIDINE GLUCONATE 15 ML: 1.2 RINSE ORAL at 08:00

## 2024-01-26 RX ADMIN — FOLIC ACID TAB 400 MCG 400 MCG: 400 TAB at 08:36

## 2024-01-26 RX ADMIN — DIBASIC SODIUM PHOSPHATE, MONOBASIC POTASSIUM PHOSPHATE AND MONOBASIC SODIUM PHOSPHATE 1 TABLET: 852; 155; 130 TABLET ORAL at 11:44

## 2024-01-26 RX ADMIN — DIBASIC SODIUM PHOSPHATE, MONOBASIC POTASSIUM PHOSPHATE AND MONOBASIC SODIUM PHOSPHATE 1 TABLET: 852; 155; 130 TABLET ORAL at 21:36

## 2024-01-26 RX ADMIN — CHOLECALCIFEROL TAB 10 MCG (400 UNIT) 400 UNITS: 10 TAB at 08:36

## 2024-01-26 RX ADMIN — LISINOPRIL 10 MG: 10 TABLET ORAL at 11:44

## 2024-01-26 RX ADMIN — INSULIN LISPRO 5 UNITS: 100 INJECTION, SOLUTION INTRAVENOUS; SUBCUTANEOUS at 11:41

## 2024-01-26 RX ADMIN — THIAMINE HYDROCHLORIDE 100 MG: 100 INJECTION, SOLUTION INTRAMUSCULAR; INTRAVENOUS at 08:05

## 2024-01-26 RX ADMIN — DIBASIC SODIUM PHOSPHATE, MONOBASIC POTASSIUM PHOSPHATE AND MONOBASIC SODIUM PHOSPHATE 1 TABLET: 852; 155; 130 TABLET ORAL at 07:25

## 2024-01-26 RX ADMIN — HYDRALAZINE HYDROCHLORIDE 5 MG: 20 INJECTION, SOLUTION INTRAMUSCULAR; INTRAVENOUS at 07:25

## 2024-01-26 RX ADMIN — INSULIN GLARGINE 8 UNITS: 100 INJECTION, SOLUTION SUBCUTANEOUS at 21:36

## 2024-01-26 RX ADMIN — INSULIN LISPRO 5 UNITS: 100 INJECTION, SOLUTION INTRAVENOUS; SUBCUTANEOUS at 07:52

## 2024-01-26 RX ADMIN — INSULIN LISPRO 2 UNITS: 100 INJECTION, SOLUTION INTRAVENOUS; SUBCUTANEOUS at 07:51

## 2024-01-26 RX ADMIN — INSULIN LISPRO 1 UNITS: 100 INJECTION, SOLUTION INTRAVENOUS; SUBCUTANEOUS at 21:38

## 2024-01-26 RX ADMIN — DIBASIC SODIUM PHOSPHATE, MONOBASIC POTASSIUM PHOSPHATE AND MONOBASIC SODIUM PHOSPHATE 1 TABLET: 852; 155; 130 TABLET ORAL at 16:20

## 2024-01-26 RX ADMIN — SENNOSIDES 8.6 MG: 8.6 TABLET, FILM COATED ORAL at 21:36

## 2024-01-26 NOTE — TELEPHONE ENCOUNTER
----- Message from ARIANE Noguera sent at 1/26/2024 11:09 AM EST -----  Regarding: Follow-up  Patient was seen in [facility] by ICU team, they are requesting he follow-up with nephrology team at discharge.  He is seen Tanika Ricardo in the past in 2019.  Please arrange for outpatient follow-up on discharge in the [place] office

## 2024-01-26 NOTE — ASSESSMENT & PLAN NOTE
Platelet 138 on admission.  No evidence of bleeding.  Improving.  COVID/Flu negative    Plan  Follow up with PCP

## 2024-01-26 NOTE — DISCHARGE SUMMARY
[facility]  Discharge- Wes Araujo 1970, 53 y.o. male MRN: [id number]  Unit/Bed#: ICU 13 Encounter: [id number]  Primary Care Provider: No primary care provider on file.   Date and time admitted to hospital: 1/25/2024 10:06 AM    * Hyperosmolar hyperglycemic state (HHS) (HCC)-resolved as of 1/26/2024  Assessment & Plan  Lab Results   Component Value Date    HGBA1C 15.7 (H) 01/26/2024       Recent Labs     01/25/24  2142 01/26/24  0501 01/26/24  0748 01/26/24  1138   POCGLU 118 217* 203* 144*       Blood Sugar Average: Last 72 hrs:  (P) 167.3919530640375731    53-year-old male admitted due to HHS.  Required ICU level of care.  Patient requesting to be discharged today.  Cannot wait until Monday for endocrinology evaluation.  He will be discharged with glargine 10 units at bedtime and lispro 5 units 3 times daily with meals.  Will also send him with diabetic supplies due to concerns for noncompliance in the past.    Seizure (HCC)  Assessment & Plan  Patient had a witnessed generalized tonic-clonic seizure in the ED. Patient was found unconscious on the floor prior to admission, unclear if he had any jerking movements.  No history. Possibly alcohol related. No recurrence.   No AED initiation indicated at this time as per neurology    Type 2 diabetes mellitus (HCC)  Assessment & Plan  See treatment plan above    Lactic acid blood increased  Assessment & Plan    Alcohol abuse  Assessment & Plan  Counseled    Thrombocytopenia (HCC)  Assessment & Plan  Recent Labs     01/25/24  1010 01/25/24  2356 01/26/24  0506   * 154 142*     Encephalopathy-resolved as of 1/26/2024  Assessment & Plan  Background:  On presentation, drowsy and lethargic. Imaging without acute abnormalities.   Status: Improved    ZENAIDA (acute kidney injury) (HCC)-resolved as of 1/26/2024  Assessment & Plan  Due to dehydration and uncontrolled hyperglycemia. Resolved.  Recent Labs     01/26/24  1226 01/26/24  1714  01/27/24  0447   BUN 10 12 17   CREATININE 0.86 1.09 1.19   EGFR 99 77 69       Results from last 7 days   Lab Units 01/26/24  0348   BLOOD UA  Negative   PROTEIN UA mg/dl 300 (3+)*             Medical Problems       Resolved Problems  Date Reviewed: 1/26/2024            Resolved    ZENAIDA (acute kidney injury) ([facility]) 1/26/2024     Resolved by  Justine Smith MD    * (Principal) Hyperosmolar hyperglycemic state (HHS) ([facility]) 1/26/2024     Resolved by  Justine Smith MD    Encephalopathy 1/26/2024     Resolved by  Justine Smith MD        Discharging Attending: Julio Mcneil MD  PCP: No primary care provider on file.  Admission Date:   Admission Orders (From admission, onward)       Ordered        01/25/24 1502  Inpatient Admission  Once                          Discharge Date: 01/27/24    Consultations During Hospital Stay:  Neurology    Procedures Performed:   None    Significant Findings / Test Results:   HbA1c: 15.7%    Incidental Findings:   None    Test Results Pending at Discharge (will require follow up):  None     Outpatient Tests Requested:  None HbA1c    Complications: None    Reason for Admission: Altered mental status    Hospital Course:   Wes Araujo is a 53 y.o. male patient who originally presented to the hospital on 1/25/2024 due to altered mental status.  Patient was found on the floor by patient's father on arrival to ED. Patient was drowsy and lethargic on admission.  Blood glucose was 1100s.  Patient was treated for HHS with insulin drip and fluids. Patient's blood glucose improved with fluids and insulin.  Started him on subcutaneous insulin.  Arranged outpatient endocrinology follow-up.    Patient also had a witnessed generalized tonic-clonic seizure in the ED. Improved spontaneously prior to Ativan.  He was given 1.5 g of Keppra.  No further episodes of seizures.  Discussed case  "with neurology, no indication for AEDs at this time.    The patient, initially admitted to the hospital as inpatient, was discharged earlier than expected given the following: improvement of mentation and blood glucose.    Please see above list of diagnoses and related plan for additional information.     Condition at Discharge: good    Discharge Day Visit / Exam:   Subjective: Patient seen and examined at bedside. [employer]  services were utilized. No new complaints overnight. Requests that he be discharged today.     Vitals: Blood Pressure: 150/86 (01/27/24 1000)  Pulse: 80 (01/27/24 1000)  Temperature: 97.9 °F (36.6 °C) (01/27/24 0916)  Temp Source: Axillary (01/27/24 0916)  Respirations: 18 (01/27/24 1000)  Height: 5' 9\" (175.3 cm) (01/25/24 1903)  Weight - Scale: 55.6 kg (122 lb 9.2 oz) (01/26/24 0730)  SpO2: 100 % (01/27/24 1000)  Exam:   Physical Exam  Vitals and nursing note reviewed.   Constitutional:       General: He is not in acute distress.     Appearance: He is well-developed.   HENT:      Head: Normocephalic and atraumatic.   Eyes:      Conjunctiva/sclera: Conjunctivae normal.   Cardiovascular:      Rate and Rhythm: Normal rate and regular rhythm.      Heart sounds: No murmur heard.  Pulmonary:      Effort: Pulmonary effort is normal. No respiratory distress.      Breath sounds: Normal breath sounds.   Abdominal:      Palpations: Abdomen is soft.      Tenderness: There is no abdominal tenderness.   Musculoskeletal:         General: No swelling.      Cervical back: Neck supple.   Skin:     General: Skin is warm and dry.      Capillary Refill: Capillary refill takes less than 2 seconds.   Neurological:      Mental Status: He is alert and oriented to person, place, and time.   Psychiatric:         Mood and Affect: Mood normal.         Behavior: Behavior normal.        Discharge instructions/Information to patient and family:   See after visit summary for information provided to patient and " family.      Provisions for Follow-Up Care:  See after visit summary for information related to follow-up care and any pertinent home health orders.      Mobility at time of Discharge:   Basic Mobility Inpatient Raw Score: 22  JH-HLM Goal: 7: Walk 25 feet or more  JH-HLM Achieved: 3: Sit at edge of bed  HLM Goal achieved. Continue to encourage appropriate mobility.     Disposition:   Home    Planned Readmission: no    Discharge Medications:  See after visit summary for reconciled discharge medications provided to patient and/or family.      **Please Note: This note may have been constructed using a voice recognition system**

## 2024-01-26 NOTE — PLAN OF CARE
Problem: Potential for Falls  Goal: Patient will remain free of falls  Description: INTERVENTIONS:  - Educate patient/family on patient safety including physical limitations  - Instruct patient to call for assistance with activity   - Consult OT/PT to assist with strengthening/mobility   - Keep Call bell within reach  - Keep bed low and locked with side rails adjusted as appropriate  - Keep care items and personal belongings within reach  - Initiate and maintain comfort rounds  - Make Fall Risk Sign visible to staff  - Apply yellow socks and bracelet for high fall risk patients  - Consider moving patient to room near nurses station  Outcome: Progressing     Problem: PAIN - ADULT  Goal: Verbalizes/displays adequate comfort level or baseline comfort level  Description: Interventions:  - Encourage patient to monitor pain and request assistance  - Assess pain using appropriate pain scale  - Administer analgesics based on type and severity of pain and evaluate response  - Implement non-pharmacological measures as appropriate and evaluate response  - Consider cultural and social influences on pain and pain management  - Notify physician/advanced practitioner if interventions unsuccessful or patient reports new pain  Outcome: Progressing     Problem: INFECTION - ADULT  Goal: Absence or prevention of progression during hospitalization  Description: INTERVENTIONS:  - Assess and monitor for signs and symptoms of infection  - Monitor lab/diagnostic results  - Monitor all insertion sites, i.e. indwelling lines, tubes, and drains  - Monitor endotracheal if appropriate and nasal secretions for changes in amount and color  - [place] appropriate cooling/warming therapies per order  - Administer medications as ordered  - Instruct and encourage patient and family to use good hand hygiene technique  - Identify and instruct in appropriate isolation precautions for identified infection/condition  Outcome: Progressing  Goal: Absence  of fever/infection during neutropenic period  Description: INTERVENTIONS:  - Monitor WBC    Outcome: Progressing     Problem: SAFETY ADULT  Goal: Patient will remain free of falls  Description: INTERVENTIONS:  - Educate patient/family on patient safety including physical limitations  - Instruct patient to call for assistance with activity   - Consult OT/PT to assist with strengthening/mobility   - Keep Call bell within reach  - Keep bed low and locked with side rails adjusted as appropriate  - Keep care items and personal belongings within reach  - Initiate and maintain comfort rounds  - Make Fall Risk Sign visible to staff  - Apply yellow socks and bracelet for high fall risk patients  - Consider moving patient to room near nurses station  Outcome: Progressing  Goal: Maintain or return to baseline ADL function  Description: INTERVENTIONS:  -  Assess patient's ability to carry out ADLs; assess patient's baseline for ADL function and identify physical deficits which impact ability to perform ADLs (bathing, care of mouth/teeth, toileting, grooming, dressing, etc.)  - Assess/evaluate cause of self-care deficits   - Assess range of motion  - Assess patient's mobility; develop plan if impaired  - Assess patient's need for assistive devices and provide as appropriate  - Encourage maximum independence but intervene and supervise when necessary  - Involve family in performance of ADLs  - Assess for home care needs following discharge   - Consider OT consult to assist with ADL evaluation and planning for discharge  - Provide patient education as appropriate  Outcome: Progressing  Goal: Maintains/Returns to pre admission functional level  Description: INTERVENTIONS:  - Perform AM-PAC 6 Click Basic Mobility/ Daily Activity assessment daily.  - Set and communicate daily mobility goal to care team and patient/family/caregiver.   - Collaborate with rehabilitation services on mobility goals if consulted  - Out of bed for  toileting  - Record patient progress and toleration of activity level   Outcome: Progressing

## 2024-01-26 NOTE — ASSESSMENT & PLAN NOTE
Lab Results   Component Value Date    HGBA1C 15.7 (H) 2024       Recent Labs     24  2142 24  0501 24  0748 24  1138   POCGLU 118 217* 203* 144*       Blood Sugar Average: Last 72 hrs:  (P) 167.7386468222944722    Glucose on admission 1128, anion gap 9, bicarbonate 27, BHB negative.  pH VB.2  Patient is on insulin at home, did not take the dose last night according to father.   Unclear if he had any signs of infection/fever.  HbA1c 15%     Plan  Continue subcu insulin  Follow-up with endocrinology outpatient

## 2024-01-26 NOTE — CASE MANAGEMENT
Case Management Assessment & Discharge Planning Note    Patient name Wes Araujo  Location ICU 13/ICU 13 MRN [id number]  : 1970 Date 2024       Current Admission Date: 2024  Current Admission Diagnosis:Hyperosmolar hyperglycemic state (HHS) (HCC)   Patient Active Problem List    Diagnosis Date Noted    Hyperosmolar hyperglycemic state (HHS) (HCC) 2024    Alcohol abuse 2024    Lactic acid blood increased 2024    Type 2 diabetes mellitus (HCC) 2024    Encephalopathy 2024    Chronic low back pain 2023    Alcohol withdrawal (HCC) 2023    Diarrhea 2023    Seizure (HCC) 2022    Illicit drug use 2022    Leukocytosis 2022    Hypothermia 2022    Cocaine use 2022    Abnormal chest x-ray 2022    Bilateral hearing loss 2020    Hypoglycemia 2020    CKD (chronic kidney disease), stage III (HCC) 2019    Anemia 2019    Hyponatremia 2019    History of atrial fibrillation 2019    Acute encephalopathy 2019    Alcohol intoxication in active alcoholic with complication (HCC) 2016    Essential hypertension     Type 2 diabetes mellitus with stage 3 chronic kidney disease, with long-term current use of insulin (HCC)     Uncomplicated alcohol dependence (HCC)     Paroxysmal A-fib (HCC)     Chronic pancreatitis (HCC)     Thrombocytopenia (HCC)       LOS (days): 1  Geometric Mean LOS (GMLOS) (days):   Days to GMLOS:     OBJECTIVE:    Risk of Unplanned Readmission Score: 23.84         Current admission status: Inpatient       Preferred Pharmacy:    [facility] [place] [address]  Phone: [phone] Fax: [phone]    Primary Care Provider: No primary care provider on file.    Primary Insurance: [email or web address]  Secondary Insurance:     ASSESSMENT:  Active Health Care Proxies       Araujo Wes [facility]  Representative - Father   Primary Phone: [phone] (Mobile)  Home Phone: [phone]                 Advance Directives  Primary Contact: Wes (father) [phone]              Patient Information  Admitted from:: Home  Mental Status: Alert  During Assessment patient was accompanied by: Not accompanied during assessment  Assessment information provided by:: Patient  Primary Caregiver: Self  Support Systems: Parent  County of Residence: [place]  What [facility] do you live in?: [place]  Home entry access options. Select all that apply.: Stairs  Number of steps to enter home.: 2  Type of Current Residence: Apartment  Floor Level: 2  Upon entering residence, is there a bedroom on the main floor (no further steps)?: Yes  Upon entering residence, is there a bathroom on the main floor (no further steps)?: Yes  Living Arrangements: Lives w/ Parent(s)    Activities of Daily Living Prior to Admission  Functional Status: Independent  Completes ADLs independently?: Yes  Ambulates independently?: Yes  Does patient use assisted devices?: No  Does patient currently own DME?: No  Does patient have a history of Outpatient Therapy (PT/OT)?: No  Does the patient have a history of Short-Term Rehab?: No  Does patient have a history of HHC?: No  Does patient currently have HHC?: No         Patient Information Continued  Does patient receive dialysis treatments?: No  Does patient have a history of substance abuse?: Yes  Historical substance use preference: Alcohol/ETOH, Cocaine  History of Withdrawal Symptoms: Delirium tremors  Is patient currently in treatment for substance abuse?: No. Treatment options provided (declined HOST, requested info for [facility] be placed on AVS)  Does patient have a history of Mental Health Diagnosis?: No         Means of Transportation  Means of Transport to Appts:: Family transport          DISCHARGE DETAILS:    Discharge planning discussed with:: Pt        CM contacted family/caregiver?: No- see comments  (declined need)                  Requested Home Health Care         Is the patient interested in HHC at discharge?: No    DME Referral Provided  Referral made for DME?: No    Other Referral/Resources/Interventions Provided:  Referral Comments: [facility] info placed on AVS - endo appt made               Transport at Discharge : Family                                      Additional Comments: Pt agreeable to CM making endocrinology appt. Informed him office was in [place] and pt confirmed his father or brother could transport. Appt scheduled for 2/2 at 11:20am. Pt declined HOST but was agreeable to resources being placed on AVS. [facility] info on AVS. Family to transport at time of d/c. Pt reported insurance covers insulin. He uses  pharmacy.

## 2024-01-26 NOTE — DISCHARGE SUMMARY
[facility]  Discharge- Wes Araujo 1970, 53 y.o. male MRN: [id number]  Unit/Bed#: ICU 13 Encounter: [id number]  Primary Care Provider: No primary care provider on file.   Date and time admitted to hospital: 1/25/2024 10:06 AM    No new Assessment & Plan notes have been filed under this hospital service since the last note was generated.  Service: Critical Care/ICU      Medical Problems       Resolved Problems  Date Reviewed: 1/26/2024            Resolved    ZENAIDA (acute kidney injury) (HCC) 1/26/2024     Resolved by  Justine Smith MD          Discharging Attending: Julio Mcneil MD  PCP: No primary care provider on file.  Admission Date:   Admission Orders (From admission, onward)       Ordered        01/25/24 1502  Inpatient Admission  Once                          Discharge Date: 01/26/24    Consultations During Hospital Stay:  None    Procedures Performed:   None    Significant Findings / Test Results:   HbA1c: 15.7%    Incidental Findings:   None    Test Results Pending at Discharge (will require follow up):  None     Outpatient Tests Requested:  None HbA1c    Complications: None    Reason for Admission: Altered mental status    Hospital Course:   Wes Araujo is a 53 y.o. male patient who originally presented to the hospital on 1/25/2024 due to altered mental status.  Patient was found on the floor by patient's father on arrival to ED. Patient was drowsy and lethargic on admission.  Blood glucose was 1100s.  Patient was treated for HHS with insulin drip and fluids. Patient's blood glucose improved with fluids and insulin.  Started him on subcutaneous insulin.  Arranged outpatient endocrinology follow-up.    Patient also had a witnessed generalized tonic-clonic seizure in the ED. Improved spontaneously prior to Ativan.  He was given 1.5 g of Keppra.  No further episodes of seizures.      Patient was also placed on BiPAP initially as he  "was hypercapnic.  Patient's mentation improved with correction of hypercapnia and hypoglycemia.  Lisinopril 10 mg was started for hypertension and proteinuria.  Discussed with nephrology, and they arranged outpatient follow-up.    The patient, initially admitted to the hospital as inpatient, was discharged earlier than expected given the following: improvement of mentation and blood glucose.    Please see above list of diagnoses and related plan for additional information.     Condition at Discharge: good    Discharge Day Visit / Exam:   Subjective: Patient was seen and examined at bedside.  He is lying comfortably on the bed.  Denies pain or distress.  Alert and oriented x 3.  Tolerating diet well.  Vital stable.  Satting well on room air.  Vitals: Blood Pressure: 163/84 (01/26/24 1144)  Pulse: 94 (01/26/24 1144)  Temperature: 99 °F (37.2 °C) (01/26/24 1144)  Temp Source: Oral (01/26/24 1144)  Respirations: 21 (01/26/24 1144)  Height: 5' 9\" (175.3 cm) (01/25/24 1903)  Weight - Scale: 55.6 kg (122 lb 9.2 oz) (01/26/24 0730)  SpO2: 99 % (01/26/24 1144)  Exam:   Physical Exam  Vitals and nursing note reviewed.   Constitutional:       General: He is not in acute distress.     Appearance: He is well-developed.   HENT:      Head: Normocephalic and atraumatic.   Eyes:      Conjunctiva/sclera: Conjunctivae normal.   Cardiovascular:      Rate and Rhythm: Normal rate and regular rhythm.      Heart sounds: No murmur heard.  Pulmonary:      Effort: Pulmonary effort is normal. No respiratory distress.      Breath sounds: Normal breath sounds.   Abdominal:      Palpations: Abdomen is soft.      Tenderness: There is no abdominal tenderness.   Musculoskeletal:         General: No swelling.      Cervical back: Neck supple.   Skin:     General: Skin is warm and dry.      Capillary Refill: Capillary refill takes less than 2 seconds.   Neurological:      Mental Status: He is alert and oriented to person, place, and time. "   Psychiatric:         Mood and Affect: Mood normal.         Behavior: Behavior normal.          Discussion with Family: {Family Communication:49039}    Discharge instructions/Information to patient and family:   See after visit summary for information provided to patient and family.      Provisions for Follow-Up Care:  See after visit summary for information related to follow-up care and any pertinent home health orders.      Mobility at time of Discharge:   Basic Mobility Inpatient Raw Score: 22  JH-HLM Goal: 7: Walk 25 feet or more  JH-HLM Achieved: 3: Sit at edge of bed  HLM Goal achieved. Continue to encourage appropriate mobility.     Disposition:   Home    Planned Readmission: no    Discharge Medications:  See after visit summary for reconciled discharge medications provided to patient and/or family.      **Please Note: This note may have been constructed using a voice recognition system**

## 2024-01-26 NOTE — UTILIZATION REVIEW
NOTIFICATION OF INPATIENT ADMISSION   AUTHORIZATION REQUEST   SERVICING FACILITY:   [place] [address]  Tax ID: [id number]  NPI: [id number] ATTENDING PROVIDER:  Attending Name and NPI#: Julio Mcneil Md [[id number]]  Address: [address] [place] [address]  Phone: [phone]     ADMISSION INFORMATION:  Place of Service: Inpatient [facility]  Place of Service Code: 21  Inpatient Admission Date/Time: 1/25/24  3:02 PM  Discharge Date/Time: No discharge date for patient encounter.  Admitting Diagnosis Code/Description:  Dehydration [E86.0]  Seizure (HCC) [R56.9]  Altered mental status [R41.82]  Encephalopathy [G93.40]  Hyperglycemia [R73.9]     UTILIZATION REVIEW CONTACT:  Katie Almeida, Utilization   Network Utilization Review Department  Phone: [phone]  Fax [phone]  Email: [email or web address]  Contact for approvals/pending authorizations, clinical reviews, and discharge.     PHYSICIAN ADVISORY SERVICES:  Medical Necessity Denial & Tmdw-cv-Owwp Review  Phone: [phone]  Fax: [phone]  Email: [email or web address]     DISCHARGE SUPPORT TEAM:  For Patients Discharge Needs & Updates  Phone: [phone] opt. 2 Fax: [phone]  Email: [email or web address]

## 2024-01-26 NOTE — ASSESSMENT & PLAN NOTE
Last alcohol intake unknown, ethanol level on admission less than 10.  Patient is drowsy and lethargic on examination.     Plan  Monitor for signs of withdrawal.  [facility] protocol

## 2024-01-26 NOTE — ASSESSMENT & PLAN NOTE
Lactic acid 3.9, 3.7, 2.8  Patient is afebrile, vital stable, satting well on room air.  Examination without evidence of infection.  Chest x-ray unremarkable. UA positive leukocytes, but no nitrites or bacteria.   Lactic acid trended down

## 2024-01-26 NOTE — ASSESSMENT & PLAN NOTE
Patient had a witnessed generalized tonic-clonic seizure in the ED. Patient was found unconscious on the floor prior to admission, unclear if he had any jerking movements.    No prior history of seizures/epilepsy.    Patient is an alcoholic. Use large amount of alcohol daily.  Last alcohol intake unknown.    Ethanol level on admission less than 10.  CT head unremarkable.  Urine drug screen: negative     Plan  Neurochecks  Ativan as needed for seizures  Seizure precausions  Follow-up with PCP

## 2024-01-26 NOTE — CERTIFIED RECOVERY SPECIALIST
Certified  Note    Patient name: Wes Araujo  Location: ICU 13/ICU 13  [place]: [facility]  Attending:  Julio Mcneil MD MRN [id number]  : 1970  Age: 53 y.o.    Sex: male Date 2024         Substance Use History:     Social History     Substance and Sexual Activity   Alcohol Use Yes        Social History     Substance and Sexual Activity   Drug Use Yes    Types: Cocaine       Recovery resources provided, also added to AVS.  CRS to follow    Christina Cotto

## 2024-01-26 NOTE — UTILIZATION REVIEW
Initial Clinical Review    Admission: Date/Time/Statement:   Admission Orders (From admission, onward)       Ordered        24 1502  Inpatient Admission  Once                          Orders Placed This Encounter   Procedures    Inpatient Admission     Standing Status:   Standing     Number of Occurrences:   1     Order Specific Question:   Level of Care     Answer:   Level 1 Stepdown [13]     Order Specific Question:   Estimated length of stay     Answer:   More than 2 Midnights     Order Specific Question:   Certification     Answer:   I certify that inpatient services are medically necessary for this patient for a duration of greater than two midnights. See H&P and MD Progress Notes for additional information about the patient's course of treatment.     ED Arrival Information       Expected   -    Arrival   2024 10:06    Acuity   Emergent              Means of arrival   Ambulance    Escorted by   [facility] ([facility])    Service   Critical Care/ICU    Admission type   Emergency              Arrival complaint   Altered Mental Status             Chief Complaint   Patient presents with    Altered Mental Status     Pt arrives via EMS, was found unconscious at a [place]ode[place], per EMS unconscious for 5 minutes after their arrival, blood glucose >500, then became aggressive towards EMS. Arrived in restraints       Initial Presentation: 53 y.o. male to ED by EMS found unconscious w EMS summoned. Cont unconscious for 5 MIN after EMS arrival. Per bystanders no overt SZ like activity  Pmh  alcoholic abuse w baseline large amt daily, last intake unknown, DM2 w noncompliance per Father of patient    EXAM  GCS 14; found unconscious on floor prior to admit then witnessed + tonic /clonic SZ event  w apnea, glucose on admit 1128, anion gap 9, bicarbonate 27, BHB negative.  pH VB.2 . ETOH level <10, ZENAIDA w hypokalemia & hyponatremia  ; Given IV ativan & BVM to assist ventilation  Inpatient SD 1 ICU due to acute  metabolic / toxic encephalopathy, HHNK w severe hyperglycemia, SZ, acute respiratory acidosis w hypercapnia, DM2, mild ZENAIDA w hypokalemia & hyponatremia, alcohol abuse    ICU management, neuro checks & avoid sedatives, avoid rapid decline of glucose, SZ precautions & spot EEG if recurrent, CIWA, thiamine & folic acid. BiPAP, NPO, aggressive IVF resuscitation w/ IV insulin infusion, replete & monitor electrolytes, freq blood gluc trend, repeat CBC. Monitor OFF antibx  Date: 1/26/2024   Day 2: critical care  AMS improved, resolved acute respiratory acidosis, hypokalemia/hyponatremia improved. Cont insulin regimen w Lantus & SSI, consult ENDO; adv diet as sabiha w Dietician consult;   start lisinopril 10 mg daily & consider Nephrology consult for management of HTN/ proteinuria; OOB  May transfer out of ICU  ED Triage Vitals   Temperature Pulse Respirations Blood Pressure SpO2   01/25/24 1008 01/25/24 1008 01/25/24 1008 01/25/24 1008 01/25/24 1008   97.6 °F (36.4 °C) 97 22 (!) 220/98 96 %      Temp Source Heart Rate Source Patient Position - Orthostatic VS BP Location FiO2 (%)   01/25/24 1008 01/25/24 1630 01/25/24 1630 01/25/24 1630 --   Oral Monitor Lying Left arm       Pain Score       01/25/24 1008       No Pain          Wt Readings from Last 1 Encounters:   01/26/24 55.6 kg (122 lb 9.2 oz)     Additional Vital Signs: & GCS=  Trauma Secondary Assessment - Lil[place] Coma Scale    Date and Time Eye Opening Best Verbal Response Best Motor Response [place] Coma Scale Score User   01/26/24 0800 4 5 6 15 CM   01/26/24 0400 4 4 6 14 MD   01/26/24 0000 3 4 6 13 MD   01/25/24 2000 3 4 6 13 MD   01/25/24 1600 4 4 4 12 VF   01/25/24 1400 4 4 4 12 EP   01/25/24 1023 4 4 6 14 EP     Date/Time Temp Pulse Resp BP MAP (mmHg) SpO2 O2 Device O2 Interface Device Patient Position - Orthostatic VS   01/26/24 1011 -- 82 16 114/61 -- 100 % -- -- --   01/26/24 0800 -- 94 19 151/84 109 100 % -- -- --   01/26/24 0720 98 °F (36.7 °C) 64 12 192/94  "Abnormal  147 100 % BiPAP -- Lying   01/26/24 0600 -- 62 13 177/84 Abnormal  118 100 % -- -- --   01/26/24 0500 -- 76 14 168/81 130 100 % -- -- --   01/26/24 0400 -- 64 16 174/92 Abnormal  130 100 % -- -- --   01/26/24 0300 97.3 °F (36.3 °C) Abnormal  66 11 Abnormal  149/77 108 100 % BiPAP -- Lying   01/26/24 0000 -- -- -- -- -- -- -- Full face mask --   01/25/24 2330 -- 62 15 136/76 102 100 % -- -- --   01/25/24 2300 -- 64 12 192/92 Abnormal  128 100 % -- -- --   01/25/24 2200 -- 62 11 Abnormal  161/90 123 100 % -- -- --   01/25/24 2100 -- 66 14 151/84 120 100 % -- -- --   01/25/24 2000 -- 76 14 151/70 103 100 % -- Full face mask --   01/25/24 1903 97.4 °F (36.3 °C) Abnormal  80 16 158/65 -- -- -- -- --   01/25/24 1817 -- 72 14 165/82 118 100 % -- -- --   01/25/24 1630 -- 69 18 170/92 123 100 % None (Room air) -- Lying   01/25/24 1430 -- 81 21 161/93 122 100 % -- -- --   01/25/24 1400 -- 84 22 156/93 118 100 % -- -- --   01/25/24 1338 -- 92 19 130/82 -- 98 % -- -- --   01/25/24 1330 -- 87 -- -- -- 100 % -- -- --   01/25/24 1230 -- -- -- 203/114 Abnormal  150 -- -- -- --   01/25/24 1200 -- 96 -- 190/102 Abnormal  132 97 % -- -- --   01/25/24 1130 -- 98 19 187/95 Abnormal  132 97 % -- -- --   01/25/24 1115 -- 100 23 Abnormal  176/94 Abnormal  128 96 % -- -- --   01/25/24 1100 -- 102 18 177/85 Abnormal  122 94 % -- -- --   01/25/24 1015 -- 95 22 193/93 Abnormal  134 98 % -- -- --   01/25/24 1008 97.6 °F (36.4 °C) 97 22 220/98 Abnormal  -- 96 % -- -- --     Weights (last 14 days)    Date/Time Weight Weight Method Height   01/26/24 0730 55.6 kg (122 lb 9.2 oz) Bed scale --   01/26/24 0600 55.6 kg (122 lb 9.2 oz) Bed scale --   01/25/24 1903 55 kg (121 lb 4.1 oz) Bed scale 5' 9\" (1.753 m)   01/25/24 1008 55 kg (121 lb 4.1 oz) Stretcher scale 5' 9\" (1.753 m)     Pertinent Labs/Diagnostic Test Results:   XR chest 1 view portable   Final Result by Mirza Gupta MD (01/25 0308)      No acute cardiopulmonary disease.    "   CT head without contrast   Final Result by Marcos Kaye MD (01/25 1109)      No acute intracranial abnormality.        1/25/2024 EKG=   Normal sinus rhythm  Nonspecific T wave abnormality  Prolonged QT  Abnormal ECG  When compared with ECG of 17-OCT-2023 00:47,  ST now depressed in Anterior leads    Results from last 7 days   Lab Units 01/25/24  1615   SARS-COV-2  Negative     Results from last 7 days   Lab Units 01/26/24  0506 01/25/24  2356 01/25/24  1010   WBC Thousand/uL 9.19  --  10.35*   HEMOGLOBIN g/dL 11.5*  --  12.0   HEMATOCRIT % 31.8*  --  35.1*   PLATELETS Thousands/uL 142* 154 138*   NEUTROS ABS Thousands/µL 5.63  --  8.27*         Results from last 7 days   Lab Units 01/26/24  0840 01/26/24  0506 01/25/24  2356 01/25/24  1845 01/25/24  1427 01/25/24  1010   SODIUM mmol/L 135 136 139 140  --  124*   POTASSIUM mmol/L 3.4* 3.8 3.7 2.5*  --  3.6   CHLORIDE mmol/L 104 105 105 105  --  88*   CO2 mmol/L 27 28 29 30  --  27   ANION GAP mmol/L 4 3 5 5  --  9   BUN mg/dL 8 8 7 7  --  8   CREATININE mg/dL 0.86 0.90 0.90 0.98  --  1.39*   EGFR ml/min/1.73sq m 99 97 97 87  --  57   CALCIUM mg/dL 8.6 8.7 8.9 9.0  --  8.6   CALCIUM, IONIZED mmol/L  --  1.22  --  1.20  --   --    MAGNESIUM mg/dL  --  1.9  --   --  1.8*  --    PHOSPHORUS mg/dL  --  1.6*  --   --   --   --      Results from last 7 days   Lab Units 01/25/24  1038 01/25/24  1010   AST U/L  --  32   ALT U/L  --  56*   ALK PHOS U/L  --  103   TOTAL PROTEIN g/dL  --  6.4   ALBUMIN g/dL  --  3.6   TOTAL BILIRUBIN mg/dL  --  0.54   AMMONIA umol/L 31  --      Results from last 7 days   Lab Units 01/26/24  0748 01/26/24  0501 01/25/24  2142 01/25/24  1908 01/25/24  1811 01/25/24  1633 01/25/24  1421 01/25/24  1258 01/25/24  1011   POC GLUCOSE mg/dl 203* 217* 118 98 102 292* >500* >500* >500*     Results from last 7 days   Lab Units 01/26/24  0840 01/26/24  0506 01/25/24  2356 01/25/24  1845 01/25/24  1427 01/25/24  1258 01/25/24  1010   GLUCOSE RANDOM  mg/dL 219* 200* 160* 47* 345* 740* 1,128*             BETA-HYDROXYBUTYRATE   Date Value Ref Range Status   01/25/2024 0.2 <0.6 mmol/L Final   10/17/2023 0.1 <0.6 mmol/L Final   02/02/2019 0.16 0.02 - 0.27 mmol/L Final          Results from last 7 days   Lab Units 01/26/24  0702 01/25/24  1038   PH [place]  7.324 7.219*   PCO2 [place] mm Hg 50.3* 60.5*   PO2 [place] mm Hg 28.1* 30.7*   HCO3 [place] mmol/L 25.6 24.1   BASE EXC [place] mmol/L -0.9 -4.4   O2 CONTENT [place] ml/dL 9.5 9.9   O2 HGB, VENOUS % 56.6* 54.4*             Results from last 7 days   Lab Units 01/25/24  1258 01/25/24  1010   HS TNI 0HR ng/L  --  8   HS TNI 2HR ng/L 9  --    HSTNI D2 ng/L 1  --              Results from last 7 days   Lab Units 01/25/24  1427   TSH 3RD GENERATON uIU/mL 1.505         Results from last 7 days   Lab Units 01/25/24  1845 01/25/24  1258 01/25/24  1038   LACTIC ACID mmol/L 2.8* 3.7* 3.9*             Results from last 7 days   Lab Units 01/26/24  0348   CLARITY UA  Clear   COLOR UA  Yellow   SPEC GRAV UA  1.020   PH UA  6.5   GLUCOSE UA mg/dl 500 (1/2%)*   KETONES UA mg/dl Negative   BLOOD UA  Negative   PROTEIN UA mg/dl 300 (3+)*   NITRITE UA  Negative   BILIRUBIN UA  Negative   UROBILINOGEN UA (BE) mg/dl <2.0   LEUKOCYTES UA  Negative   WBC UA /hpf 4-10*   RBC UA /hpf 2-4*   BACTERIA UA /hpf None Seen   EPITHELIAL CELLS WET PREP /hpf None Seen   MUCUS THREADS  Occasional*     Results from last 7 days   Lab Units 01/25/24  1615   INFLUENZA A PCR  Negative   INFLUENZA B PCR  Negative   RSV PCR  Negative         Results from last 7 days   Lab Units 01/26/24  0348   AMPH/METH  Negative   BARBITURATE UR  Negative   BENZODIAZEPINE UR  Negative   COCAINE UR  Negative   METHADONE URINE  Negative   OPIATE UR  Negative   PCP UR  Negative   THC UR  Negative     Results from last 7 days   Lab Units 01/25/24  1038   ETHANOL LVL mg/dL <10       ED Treatment:   Medication Administration from 01/25/2024 1006 to 01/25/2024 1901         Date/Time Order Dose  Route Action     01/25/2024 1255 EST sodium chloride 0.9 % bolus 1,000 mL 0 mL Intravenous Stopped     01/25/2024 1040 EST sodium chloride 0.9 % bolus 1,000 mL 1,000 mL Intravenous New Bag     01/25/2024 1049 EST levETIRAcetam (KEPPRA) injection 1,500 mg 1,500 mg Intravenous Given     01/25/2024 1040 EST LORazepam (ATIVAN) injection 2 mg 2 mg Intravenous Given     01/25/2024 1815 EST insulin regular (HumuLIN R,NovoLIN R) 1 Units/mL in sodium chloride 0.9 % 100 mL infusion 1.4 Units/hr Intravenous Rate/Dose Change     01/25/2024 1641 EST insulin regular (HumuLIN R,NovoLIN R) 1 Units/mL in sodium chloride 0.9 % 100 mL infusion 2.75 Units/hr Intravenous Rate/Dose Change     01/25/2024 1551 EST insulin regular (HumuLIN R,NovoLIN R) 1 Units/mL in sodium chloride 0.9 % 100 mL infusion 1.38 Units/hr Intravenous Rate/Dose Change     01/25/2024 1327 EST insulin regular (HumuLIN R,NovoLIN R) 1 Units/mL in sodium chloride 0.9 % 100 mL infusion 2.75 Units/hr Intravenous Rate/Dose Change     01/25/2024 1152 EST insulin regular (HumuLIN R,NovoLIN R) 1 Units/mL in sodium chloride 0.9 % 100 mL infusion 5.5 Units/hr Intravenous New Bag     01/25/2024 1400 EST potassium chloride 20 mEq IVPB (premix) 0 mEq Intravenous Stopped     01/25/2024 1148 EST potassium chloride 20 mEq IVPB (premix) 20 mEq Intravenous New Bag     01/25/2024 1745 EST lactated ringers bolus 1,000 mL 0 mL Intravenous Stopped     01/25/2024 1624 EST lactated ringers bolus 1,000 mL 1,000 mL Intravenous New Bag          Past Medical History:   Diagnosis Date    Alcohol abuse     Chronic pancreatitis (HCC)     Diabetes mellitus (HCC)     Type 2    Pancreatitis     Paroxysmal A-fib (HCC)     Thrombocytopenia (HCC)      Present on Admission:   Seizure (HCC)   Thrombocytopenia (HCC)   (Resolved) ZENAIDA (acute kidney injury) (HCC)      Admitting Diagnosis: Dehydration [E86.0]  Seizure (HCC) [R56.9]  Altered mental status [R41.82]  Encephalopathy [G93.40]  Hyperglycemia  [R73.9]  Age/Sex: 53 y.o. male  Admission Orders:  Continuous cardiopulmonary & pulse oximetry  Freq neuro checks  BiPAP  NPO adv to bailey/ CHO control  CIWA  SZ precautions  I/O  Daily WT  Freq blood glucose checks  SCD    Scheduled Medications:  chlorhexidine, 15 mL, Mouth/Throat, Q12H JAISON  cholecalciferol, 400 Units, Oral, Daily  enoxaparin, 40 mg, Subcutaneous, Daily  folic acid, 400 mcg, Oral, Daily  insulin glargine, 8 Units, Subcutaneous, HS  insulin lispro, 1-6 Units, Subcutaneous, 4x Daily (AC & HS)  insulin lispro, 5 Units, Subcutaneous, TID With Meals  lisinopril, 10 mg, Oral, Daily  potassium-sodium phosphateS, 1 tablet, Oral, 4x Daily (with meals and at bedtime)  senna, 1 tablet, Oral, HS  thiamine, 100 mg, Intravenous, Daily    Continuous IV Infusions:   1/25 1152 & DC 1920=insulin regular (HumuLIN R,NovoLIN R) 1 Units/mL in sodium chloride 0.9 % 100 mL infusion  Rate: 0.1-30 mL/hr Dose: 0.1-30 Units/hr  Freq: Continuous Route: IV  Last Dose: Stopped (01/25/24 1920)     1/25 1923 & DC 1/26 0715= lactated ringers infusion  Rate: 100 mL/hr Dose: 100 mL/hr  Freq: Continuous Route: IV  Indications of Use: IV Hydration  Last Dose: Stopped (01/26/24 0715)  Start: 01/25/24 1600 End: 01/26/24 0647     PRN Meds:  acetaminophen, 650 mg, Oral, Q6H PRN  hydrALAZINE, 5 mg, Intravenous, Q6H PRN  sodium chloride (PF), 3 mL, Intravenous, Q1H PRN      IP CONSULT TO CASE MANAGEMENT  IP CONSULT TO ENDOCRINOLOGY  IP CONSULT TO NUTRITION SERVICES    Network Utilization Review Department  ATTENTION: Please call with any questions or concerns to [phone] and carefully listen to the prompts so that you are directed to the right person. All voicemails are confidential.   For Discharge needs, contact Care Management DC Support Team at [phone] opt. [phone]  Send all requests for admission clinical reviews, approved or denied determinations and any other requests to dedicated fax number below belonging to the campus where the  patient is receiving treatment. List of dedicated fax numbers for the Facilities:  FACILITY NAME UR FAX NUMBER   ADMISSION DENIALS (Administrative/Medical Necessity) [phone]   DISCHARGE SUPPORT TEAM (NETWORK) [phone]   PARENT CHILD HEALTH (Maternity/NICU/Pediatrics) [phone]   [facility] [phone]   [facility] [phone]   Critical access hospital [phone]   [facility] [phone]   [facility] [phone]   [facility] [phone]   [facility] [phone]   [facility] [phone]   [facility] [phone]   [facility] [phone]   [facility] [phone]   [facility] [phone]

## 2024-01-26 NOTE — PROGRESS NOTES
[facility]  Progress Note  Name: Wes Araujo I  MRN: [id number]  Unit/Bed#: ICU 13 I Date of Admission: 2024   Date of Service: 2024 I Hospital Day: 1    Assessment/Plan   * Hyperosmolar hyperglycemic state (HHS) (HCC)  Assessment & Plan  Lab Results   Component Value Date    HGBA1C 10.2 (H) 2023       Recent Labs     24  1011 24  1258 24  1421   POCGLU >500* >500* >500*         Blood Sugar Average: Last 72 hrs:    Glucose on admission 1128, anion gap 9, bicarbonate 27, BHB negative.  pH VB.2  Patient is on insulin at home, did not take the dose last night according to father.   Unclear if he had any signs of infection/fever.    Plan  Discontinue fluids  Start diet  Start subcutaneous insulin  Monitor and replete electrolytes as needed  Accu-Cheks  Hypoglycemic protocol    Seizure (HCC)  Assessment & Plan  Patient had a witnessed generalized tonic-clonic seizure in the ED. Patient was found unconscious on the floor prior to admission, unclear if he had any jerking movements.    No prior history of seizures/epilepsy.    Patient is an alcoholic. Use large amount of alcohol daily.  Last alcohol intake unknown.    Ethanol level on admission less than 10.  CT head unremarkable.    Plan  Neurochecks  Ativan as needed for seizures  May need lumbar puncture for infectious workup if patient continues to develop seizures  Urine drug screen: negative  Seizure precausions      Encephalopathy  Assessment & Plan  Patient appears drowsy and lethargic.  Does not answer any of the questions or does not follow commands.  Ammonia WNR. BUN WNR.  Imaging does not show any acute changes.  Likely in the setting of hyperglycemia and hypercapnia.   TSH, folate is normal.    Plan  Maintain normoglycemia  Maintain normotension, normothermia  Monitor and correct electrolytes  Avoid constipation  Urinary retention protocol  Start thiamine  Follow-up vitamin B12  Would consider  infectious workup if patient continues to have seizures or if encephalopathy does not improve with glucose correction.    Type 2 diabetes mellitus (HCC)  Assessment & Plan  Lab Results   Component Value Date    HGBA1C 10.2 (H) 01/30/2023       Recent Labs     01/25/24  1011 01/25/24  1258 01/25/24  1421   POCGLU >500* >500* >500*         Blood Sugar Average: Last 72 hrs:    Patient is on insulin at home, unclear of the regimen.    Plan  Continue subcutaneous insulin  Accu-Cheks  Hypoglycemic protocol    Lactic acid blood increased  Assessment & Plan  Lactic acid 3.9, 3.7, 2.8  Patient is afebrile, vital stable, satting well on room air.  Examination without evidence of infection.  Chest x-ray unremarkable. UA positive leukocytes, but no nitrites or bacteria.      Alcohol abuse  Assessment & Plan  Last alcohol intake unknown, ethanol level on admission less than 10.  Patient is drowsy and lethargic on examination.    Plan  Monitor for signs of withdrawal.  CIWA protocol    Thrombocytopenia ([facility])  Assessment & Plan  Platelet 138 on admission.  No evidence of bleeding.  Improving.  COVID/Flu negative    Plan  Monitor CBC.      ZENAIDA (acute kidney injury) ([facility])-resolved as of 1/26/2024  Assessment & Plan  Creatinine on admission 1.3. Improved to 0.9 with fluids.           VTE Pharmacologic Prophylaxis: VTE Score: 1 Moderate Risk (Score 3-4) - Pharmacological DVT Prophylaxis Ordered: enoxaparin (Lovenox).    Mobility:   Basic Mobility Inpatient Raw Score: 20  JH-HLM Goal: 6: Walk 10 steps or more  JH-HLM Achieved: 3: Sit at edge of bed  HLM Goal achieved. Continue to encourage appropriate mobility.    Patient Centered Rounds: I performed bedside rounds with nursing staff today.  Discussions with Specialists or Other Care Team Provider:     Education and Discussions with Family / Patient:  will call later.     Current Length of Stay: 1 day(s)  Current Patient Status: Inpatient   Discharge Plan:  TBD    Code Status: Level 1  - Full Code    Subjective:   Patient was seen and examined at bedside.  Patient is more alert.  Oriented x 3. Denies pain or discomfort.  Still does not answer any questions, but follows commands.  Mentioned that he wants to go home, also claims that he feels better.    Objective:     Vitals:   Temp (24hrs), Av.4 °F (36.3 °C), Min:97.3 °F (36.3 °C), Max:97.6 °F (36.4 °C)    Temp:  [97.3 °F (36.3 °C)-97.6 °F (36.4 °C)] 97.3 °F (36.3 °C)  HR:  [] 76  Resp:  [11-23] 14  BP: (130-220)/() 168/81  SpO2:  [94 %-100 %] 100 %  Body mass index is 17.91 kg/m².     Input and Output Summary (last 24 hours):     Intake/Output Summary (Last 24 hours) at 2024 0653  Last data filed at 2024 0400  Gross per 24 hour   Intake 4284.06 ml   Output 850 ml   Net 3434.06 ml       Physical Exam:   Physical Exam  Vitals and nursing note reviewed.   Constitutional:       General: He is not in acute distress.     Appearance: He is well-developed.   HENT:      Head: Normocephalic and atraumatic.   Eyes:      Conjunctiva/sclera: Conjunctivae normal.   Cardiovascular:      Rate and Rhythm: Normal rate and regular rhythm.      Heart sounds: No murmur heard.  Pulmonary:      Effort: Pulmonary effort is normal. No respiratory distress.      Breath sounds: Normal breath sounds.   Abdominal:      Palpations: Abdomen is soft.      Tenderness: There is no abdominal tenderness.   Musculoskeletal:         General: No swelling.      Cervical back: Neck supple.   Skin:     General: Skin is warm and dry.      Capillary Refill: Capillary refill takes less than 2 seconds.   Neurological:      Mental Status: He is alert.   Psychiatric:         Mood and Affect: Mood normal.          Additional Data:     Labs:  Results from last 7 days   Lab Units 24  0506   WBC Thousand/uL 9.19   HEMOGLOBIN g/dL 11.5*   HEMATOCRIT % 31.8*   PLATELETS Thousands/uL 142*   NEUTROS PCT % 62   LYMPHS PCT % 30   MONOS PCT % 6   EOS PCT % 2     Results  from last 7 days   Lab Units 01/26/24  0506 01/25/24  1258 01/25/24  1010   SODIUM mmol/L 136   < > 124*   POTASSIUM mmol/L 3.8   < > 3.6   CHLORIDE mmol/L 105   < > 88*   CO2 mmol/L 28   < > 27   BUN mg/dL 8   < > 8   CREATININE mg/dL 0.90   < > 1.39*   ANION GAP mmol/L 3   < > 9   CALCIUM mg/dL 8.7   < > 8.6   ALBUMIN g/dL  --   --  3.6   TOTAL BILIRUBIN mg/dL  --   --  0.54   ALK PHOS U/L  --   --  103   ALT U/L  --   --  56*   AST U/L  --   --  32   GLUCOSE RANDOM mg/dL 200*   < > 1,128*    < > = values in this interval not displayed.         Results from last 7 days   Lab Units 01/26/24  0501 01/25/24  2142 01/25/24  1908 01/25/24  1811 01/25/24  1633 01/25/24  1421 01/25/24  1258 01/25/24  1011   POC GLUCOSE mg/dl 217* 118 98 102 292* >500* >500* >500*         Results from last 7 days   Lab Units 01/25/24  1845 01/25/24  1258 01/25/24  1038   LACTIC ACID mmol/L 2.8* 3.7* 3.9*       Lines/Drains:  Invasive Devices       Peripheral Intravenous Line  Duration             Peripheral IV 01/25/24 Left Antecubital <1 day    Peripheral IV 01/25/24 Right;Upper Arm <1 day                          Imaging: Reviewed radiology reports from this admission including: chest xray    Recent Cultures (last 7 days):         Last 24 Hours Medication List:   Current Facility-Administered Medications   Medication Dose Route Frequency Provider Last Rate    acetaminophen  650 mg Oral Q6H PRN Justine Smith MD      chlorhexidine  15 mL Mouth/Throat Q12H JAISON Justine Smith MD      cholecalciferol  400 Units Oral Daily Justine Smith MD      enoxaparin  40 mg Subcutaneous Daily Wathsala Erandkaty Smith MD      hydrALAZINE  5 mg Intravenous Q6H PRN Justine Smith MD      insulin glargine  8 Units Subcutaneous HS Justine Mcclain  MD Sarah      insulin lispro  1-6 Units Subcutaneous 4x Daily (AC & HS) Justine Smith MD      insulin lispro  5 Units Subcutaneous TID With Meals Justine Smith MD      potassium-sodium phosphateS  1 tablet Oral 4x Daily (with meals and at bedtime) Justine Smith MD      senna  1 tablet Oral HS Justine Smith MD      sodium chloride (PF)  3 mL Intravenous Q1H PRN Scotty Gomez PA-C      thiamine  100 mg Intravenous Daily Justine Smith MD          Today, Patient Was Seen By: Justine Smith MD    **Please Note: This note may have been constructed using a voice recognition system.**

## 2024-01-26 NOTE — NURSING NOTE
Upon arrival to ICU pt lethargic and not responding to questions.  used to attempt to assess pt; however, pt still refused to respond. RN notified CCNP. Decision was made to place pt on Bipap overnight and check VBG in morning.

## 2024-01-26 NOTE — DISCHARGE INSTR - OTHER ORDERS
Chrisitna Cotto, MS, [facility]  Certified   Certified Family   [phone]    Gwen Limon   Certified     [facility]  [phone]    [facility]  [address], 1st [place]  [address]  [phone]    [facility] [place]  [address] [place]  [address]  [phone]

## 2024-01-26 NOTE — ASSESSMENT & PLAN NOTE
Lab Results   Component Value Date    HGBA1C 15.7 (H) 01/26/2024       Recent Labs     01/25/24  2142 01/26/24  0501 01/26/24  0748 01/26/24  1138   POCGLU 118 217* 203* 144*       Blood Sugar Average: Last 72 hrs:  (P) [id number]    Patient is on insulin at home, unclear of the regimen.     Plan  Continue subcutaneous insulin  Accu-Cheks  Hypoglycemic protocol  Continue subcu insulin  Follow-up with endocrinology outpatient  Monitor blood glucose at home

## 2024-01-27 VITALS
BODY MASS INDEX: 18.16 KG/M2 | HEART RATE: 86 BPM | HEIGHT: 69 IN | RESPIRATION RATE: 15 BRPM | SYSTOLIC BLOOD PRESSURE: 121 MMHG | OXYGEN SATURATION: 100 % | DIASTOLIC BLOOD PRESSURE: 54 MMHG | WEIGHT: 122.58 LBS | TEMPERATURE: 97.9 F

## 2024-01-27 LAB
ANION GAP SERPL CALCULATED.3IONS-SCNC: 5 MMOL/L
BUN SERPL-MCNC: 17 MG/DL (ref 5–25)
CALCIUM SERPL-MCNC: 8.1 MG/DL (ref 8.4–10.2)
CHLORIDE SERPL-SCNC: 105 MMOL/L (ref 96–108)
CO2 SERPL-SCNC: 24 MMOL/L (ref 21–32)
CREAT SERPL-MCNC: 1.19 MG/DL (ref 0.6–1.3)
GFR SERPL CREATININE-BSD FRML MDRD: 69 ML/MIN/1.73SQ M
GLUCOSE SERPL-MCNC: 171 MG/DL (ref 65–140)
GLUCOSE SERPL-MCNC: 241 MG/DL (ref 65–140)
GLUCOSE SERPL-MCNC: 74 MG/DL (ref 65–140)
POTASSIUM SERPL-SCNC: 3.9 MMOL/L (ref 3.5–5.3)
SODIUM SERPL-SCNC: 134 MMOL/L (ref 135–147)

## 2024-01-27 PROCEDURE — 99239 HOSP IP/OBS DSCHRG MGMT >30: CPT | Performed by: STUDENT IN AN ORGANIZED HEALTH CARE EDUCATION/TRAINING PROGRAM

## 2024-01-27 PROCEDURE — 80048 BASIC METABOLIC PNL TOTAL CA: CPT | Performed by: PHYSICIAN ASSISTANT

## 2024-01-27 PROCEDURE — 99255 IP/OBS CONSLTJ NEW/EST HI 80: CPT | Performed by: STUDENT IN AN ORGANIZED HEALTH CARE EDUCATION/TRAINING PROGRAM

## 2024-01-27 PROCEDURE — 82948 REAGENT STRIP/BLOOD GLUCOSE: CPT

## 2024-01-27 RX ORDER — LANCETS 33 GAUGE
EACH MISCELLANEOUS
Qty: 100 EACH | Refills: 0 | Status: SHIPPED | OUTPATIENT
Start: 2024-01-27

## 2024-01-27 RX ORDER — LISINOPRIL 10 MG/1
10 TABLET ORAL DAILY
Qty: 30 TABLET | Refills: 0 | Status: SHIPPED | OUTPATIENT
Start: 2024-01-28 | End: 2024-02-27

## 2024-01-27 RX ORDER — INSULIN LISPRO 100 [IU]/ML
5 INJECTION, SOLUTION INTRAVENOUS; SUBCUTANEOUS
Qty: 12 ML | Refills: 0 | Status: SHIPPED | OUTPATIENT
Start: 2024-01-27 | End: 2024-02-03

## 2024-01-27 RX ORDER — INSULIN GLARGINE 100 [IU]/ML
10 INJECTION, SOLUTION SUBCUTANEOUS
Qty: 9 ML | Refills: 0 | Status: SHIPPED | OUTPATIENT
Start: 2024-01-27

## 2024-01-27 RX ORDER — PEN NEEDLE, DIABETIC 32GX 5/32"
NEEDLE, DISPOSABLE MISCELLANEOUS
Qty: 100 EACH | Refills: 0 | Status: SHIPPED | OUTPATIENT
Start: 2024-01-27

## 2024-01-27 RX ORDER — BLOOD SUGAR DIAGNOSTIC
STRIP MISCELLANEOUS
Qty: 100 EACH | Refills: 0 | Status: SHIPPED | OUTPATIENT
Start: 2024-01-27

## 2024-01-27 RX ORDER — MULTIVITAMIN
1 TABLET ORAL DAILY
Qty: 30 TABLET | Refills: 0 | Status: SHIPPED | OUTPATIENT
Start: 2024-01-27 | End: 2024-02-26

## 2024-01-27 RX ORDER — BLOOD-GLUCOSE METER
KIT MISCELLANEOUS
Qty: 1 KIT | Refills: 0 | Status: SHIPPED | OUTPATIENT
Start: 2024-01-27

## 2024-01-27 RX ORDER — GLUCOSAMINE HCL/CHONDROITIN SU 500-400 MG
CAPSULE ORAL
Qty: 100 EACH | Refills: 0 | Status: SHIPPED | OUTPATIENT
Start: 2024-01-27

## 2024-01-27 RX ADMIN — INSULIN LISPRO 1 UNITS: 100 INJECTION, SOLUTION INTRAVENOUS; SUBCUTANEOUS at 09:13

## 2024-01-27 RX ADMIN — CHLORHEXIDINE GLUCONATE 15 ML: 1.2 RINSE ORAL at 09:11

## 2024-01-27 RX ADMIN — INSULIN LISPRO 5 UNITS: 100 INJECTION, SOLUTION INTRAVENOUS; SUBCUTANEOUS at 11:55

## 2024-01-27 RX ADMIN — FOLIC ACID TAB 400 MCG 400 MCG: 400 TAB at 09:13

## 2024-01-27 RX ADMIN — CHOLECALCIFEROL TAB 10 MCG (400 UNIT) 400 UNITS: 10 TAB at 09:12

## 2024-01-27 RX ADMIN — INSULIN LISPRO 5 UNITS: 100 INJECTION, SOLUTION INTRAVENOUS; SUBCUTANEOUS at 09:13

## 2024-01-27 RX ADMIN — LISINOPRIL 10 MG: 10 TABLET ORAL at 09:13

## 2024-01-27 RX ADMIN — ENOXAPARIN SODIUM 40 MG: 40 INJECTION SUBCUTANEOUS at 09:12

## 2024-01-27 RX ADMIN — THIAMINE HCL TAB 100 MG 100 MG: 100 TAB at 09:12

## 2024-01-27 NOTE — NURSING NOTE
All patient's prescriptions sent to [facility] pharmacy. Prescriptions picked up and delivered to bedside by RN. AVS, including new medications and follow up appointments,  reviewed with patient and patient's father by Linda Ervin RN. Insulin teaching along with with teachback also performed by JACKIE Mckeon. All questions answered. Patient discharged to home along with all belongings including cell phone, , and glasses.

## 2024-01-27 NOTE — ASSESSMENT & PLAN NOTE
"53 y.o.  male with chronic alcohol dependency, polysubstance abuse,  diabetes, Afib (not on AC), HTN, chronic pancreatitis, who presented to [facility] on 1/25/24 with AMS. Neurlogy was asked to evaluate pt on 1/27 for a reported seizure witnessed in ED on 1/25.    Neurodiagnostics:  - CTH wo contrast 1/25/24:  \"No acute intracranial abnormality.\"    Plan  - CT head completed see above  - Routine EEG deferred by attending neurologist given seizure provoked in setting of severe hyperglycemia (glucose 1128 on arrival)  - Pt monitored off of AEDs given provoked seizure  - Administer Ativan prn seizure-like activity    - Continue telemetry  - Labs on arrival 1/25:  CBC: hemoglobin 12, WBC 10.35, platelets 138  CMP: sodium 124, potassium 3.6, BUN 8, creatinine 1.39, glucose 1128, AST 32, ALT 56, alkaline phosphatase 103  Ethanol level <10  TSH 1.505  Lactic acid 3.9, repeat 3.7  - Continue seizure precautions   - [employer] form to be completed and submitted online.   - seizure precautions  - Frequent neuro checks.  Continue to monitor and notify Neurology with any changes.  - Medical management and supportive care per primary team.  Correction of any metabolic or infectious disturbances.          "

## 2024-01-27 NOTE — PLAN OF CARE
Problem: Potential for Falls  Goal: Patient will remain free of falls  Description: INTERVENTIONS:  - Educate patient/family on patient safety including physical limitations  - Instruct patient to call for assistance with activity   - Consult OT/PT to assist with strengthening/mobility   - Keep Call bell within reach  - Keep bed low and locked with side rails adjusted as appropriate  - Keep care items and personal belongings within reach  - Initiate and maintain comfort rounds  - Make Fall Risk Sign visible to staff  - Offer Toileting every  Hours, in advance of need  - Initiate/Maintain alarm  - Obtain necessary fall risk management equipment:   - Apply yellow socks and bracelet for high fall risk patients  - Consider moving patient to room near nurses station  Outcome: Adequate for Discharge     Problem: PAIN - ADULT  Goal: Verbalizes/displays adequate comfort level or baseline comfort level  Description: Interventions:  - Encourage patient to monitor pain and request assistance  - Assess pain using appropriate pain scale  - Administer analgesics based on type and severity of pain and evaluate response  - Implement non-pharmacological measures as appropriate and evaluate response  - Consider cultural and social influences on pain and pain management  - Notify physician/advanced practitioner if interventions unsuccessful or patient reports new pain  Outcome: Adequate for Discharge     Problem: INFECTION - ADULT  Goal: Absence or prevention of progression during hospitalization  Description: INTERVENTIONS:  - Assess and monitor for signs and symptoms of infection  - Monitor lab/diagnostic results  - Monitor all insertion sites, i.e. indwelling lines, tubes, and drains  - Monitor endotracheal if appropriate and nasal secretions for changes in amount and color  - [place] appropriate cooling/warming therapies per order  - Administer medications as ordered  - Instruct and encourage patient and family to use good  hand hygiene technique  - Identify and instruct in appropriate isolation precautions for identified infection/condition  Outcome: Adequate for Discharge  Goal: Absence of fever/infection during neutropenic period  Description: INTERVENTIONS:  - Monitor WBC    Outcome: Adequate for Discharge     Problem: SAFETY ADULT  Goal: Patient will remain free of falls  Description: INTERVENTIONS:  - Educate patient/family on patient safety including physical limitations  - Instruct patient to call for assistance with activity   - Consult OT/PT to assist with strengthening/mobility   - Keep Call bell within reach  - Keep bed low and locked with side rails adjusted as appropriate  - Keep care items and personal belongings within reach  - Initiate and maintain comfort rounds  - Make Fall Risk Sign visible to staff  - Offer Toileting every  Hours, in advance of need  - Initiate/Maintain alarm  - Obtain necessary fall risk management equipment:   - Apply yellow socks and bracelet for high fall risk patients  - Consider moving patient to room near nurses station  Outcome: Adequate for Discharge  Goal: Maintain or return to baseline ADL function  Description: INTERVENTIONS:  -  Assess patient's ability to carry out ADLs; assess patient's baseline for ADL function and identify physical deficits which impact ability to perform ADLs (bathing, care of mouth/teeth, toileting, grooming, dressing, etc.)  - Assess/evaluate cause of self-care deficits   - Assess range of motion  - Assess patient's mobility; develop plan if impaired  - Assess patient's need for assistive devices and provide as appropriate  - Encourage maximum independence but intervene and supervise when necessary  - Involve family in performance of ADLs  - Assess for home care needs following discharge   - Consider OT consult to assist with ADL evaluation and planning for discharge  - Provide patient education as appropriate  Outcome: Adequate for Discharge  Goal:  Maintains/Returns to pre admission functional level  Description: INTERVENTIONS:  - Perform AM-PAC 6 Click Basic Mobility/ Daily Activity assessment daily.  - Set and communicate daily mobility goal to care team and patient/family/caregiver.   - Collaborate with rehabilitation services on mobility goals if consulted  - Perform Range of Motion  times a day.  - Reposition patient every  hours.  - Dangle patient  times a day  - Stand patient  times a day  - Ambulate patient  times a day  - Out of bed to chair  times a day   - Out of bed for meals  times a day  - Out of bed for toileting  - Record patient progress and toleration of activity level   Outcome: Adequate for Discharge     Problem: DISCHARGE PLANNING  Goal: Discharge to home or other facility with appropriate resources  Description: INTERVENTIONS:  - Identify barriers to discharge w/patient and caregiver  - Arrange for needed discharge resources and transportation as appropriate  - Identify discharge learning needs (meds, wound care, etc.)  - Arrange for interpretive services to assist at discharge as needed  - Refer to Case Management Department for coordinating discharge planning if the patient needs post-hospital services based on physician/advanced practitioner order or complex needs related to functional status, cognitive ability, or social support system  Outcome: Adequate for Discharge     Problem: Knowledge Deficit  Goal: Patient/family/caregiver demonstrates understanding of disease process, treatment plan, medications, and discharge instructions  Description: Complete learning assessment and assess knowledge base.  Interventions:  - Provide teaching at level of understanding  - Provide teaching via preferred learning methods  Outcome: Adequate for Discharge     Problem: Nutrition/Hydration-ADULT  Goal: Nutrient/Hydration intake appropriate for improving, restoring or maintaining nutritional needs  Description: Monitor and assess patient's  nutrition/hydration status for malnutrition. Collaborate with interdisciplinary team and initiate plan and interventions as ordered.  Monitor patient's weight and dietary intake as ordered or per policy. Utilize nutrition screening tool and intervene as necessary. Determine patient's food preferences and provide high-protein, high-caloric foods as appropriate.     INTERVENTIONS:  - Monitor oral intake, urinary output, labs, and treatment plans  - Assess nutrition and hydration status and recommend course of action  - Evaluate amount of meals eaten  - Assist patient with eating if necessary   - Allow adequate time for meals  - Recommend/ encourage appropriate diets, oral nutritional supplements, and vitamin/mineral supplements  - Order, calculate, and assess calorie counts as needed  - Recommend, monitor, and adjust tube feedings and TPN/PPN based on assessed needs  - Assess need for intravenous fluids  - Provide specific nutrition/hydration education as appropriate  - Include patient/family/caregiver in decisions related to nutrition  Outcome: Adequate for Discharge

## 2024-01-27 NOTE — CONSULTS
"Consultation - Neurology   Wes Araujo 53 y.o. male MRN: [id number]  Unit/Bed#: ICU 13 Encounter: [id number]      Assessment/Plan     Seizure (HCC)  Assessment & Plan  53 y.o.  male with chronic alcohol dependency, polysubstance abuse,  diabetes, Afib (not on AC), HTN, chronic pancreatitis, who presented to [facility] on 1/25/24 with AMS. Neurlogy was asked to evaluate pt on 1/27 for a reported seizure witnessed in ED on 1/25.    Neurodiagnostics:  - CTH wo contrast 1/25/24:  \"No acute intracranial abnormality.\"    Plan  - CT head completed see above  - Routine EEG deferred by attending neurologist given seizure provoked in setting of severe hyperglycemia (glucose 1128 on arrival)  - Pt monitored off of AEDs given provoked seizure  - Administer Ativan prn seizure-like activity    - Continue telemetry  - Labs on arrival 1/25:  CBC: hemoglobin 12, WBC 10.35, platelets 138  CMP: sodium 124, potassium 3.6, BUN 8, creatinine 1.39, glucose 1128, AST 32, ALT 56, alkaline phosphatase 103  Ethanol level <10  TSH 1.505  Lactic acid 3.9, repeat 3.7  - Continue seizure precautions   - [employer] form to be completed and submitted online.   - seizure precautions  - Frequent neuro checks.  Continue to monitor and notify Neurology with any changes.  - Medical management and supportive care per primary team.  Correction of any metabolic or infectious disturbances.                  Wes Araujo will not need outpatient follow up with Neurology. He will not require outpatient neurological testing.    **History and physical examination obtained by attending neurologist. AP in room as witness to history and physical examination obtained and acted solely as a scribe for attending neurologist. This AP did not provide any decision making for this encounter.**      History of Present Illness     Reason for Consult / Principal Problem: Concern for seizure   Hx and PE limited by: Pt with difficulty with hearing, difficulty communicating with " ";  utilized  [place] [id number]  HPI: Wes Araujo is a 53 y.o.  male with chronic alcohol dependency, polysubstance abuse,  diabetes, Afib (not on AC), HTN, chronic pancreatitis, who presented to [facility] on 1/25/24 with AMS. Neurlogy was asked to evaluate pt on 1/27 for a reported seizure witnessed in ED on 1/25.    Per ED notes: Patient reportedly seen in a [place]  where he collapsed.  No shaking/seizure activity reported at that time.  At time of arrival to the ED, patient reportedly awake, alert to voice but is lethargic.  Patient found to be in HHS.  Patient with episode of seizure activity lasting approximately 3 to 5 minutes witnessed by ED provider and ED nurse.  Patient reportedly postictal after.  Patient given 2 mg of Ativan just prior to cessation of seizure.    Per critical care notes: Patient with a witnessed generalized tonic-clonic seizure in the ED.  Patient with alcohol dependency.  Patient with large amount of alcohol use daily.  Last alcohol intake unknown.  Ethanol level on admission less than 10.    Pt reports he \"does not know how he is doing.\" Pt reports he does not feel back to baseline. Pt reports frustration with lack of doctors speaking Monegasque even with use of interpreters. Pt reports that he wants to go home. Pt reports that he needs insulin.    Pt states he is in the hospital due to having a high blood sugar. Pt states he was with a friend in a [place]ode[place] and then woke up at the hospital. Pt denies history of seizures. Pt denies family history of seizures. Pt denies head trauma causing him to pass out. Pt states he drinks alcohol \"sometimes.\" Then states \"a lot.\" Pt states he does not drink alcohol daily. Pt states he drinks alcohol about 4 days per week. Pt states he has used drugs, then quickly denies. Pt denies tobacco use. Pt does not clearly state if he is back to his baseline or not.     Per chart review:   Pt admitted to [facility] in December 2022 for concern " for seizure, felt to be in setting of hypoglycemia. Pt ultimately left hospital AMA during that admission.     Inpatient consult to Neurology  Consult performed by: Samantha Johnson PA-C  Consult ordered by: Julio Mcneil MD          Review of Systems    Historical Information   Past Medical History:   Diagnosis Date    Alcohol abuse     Chronic pancreatitis (HCC)     Diabetes mellitus (HCC)     Type 2    Pancreatitis     Paroxysmal A-fib (HCC)     Thrombocytopenia (HCC)      Past Surgical History:   Procedure Laterality Date    INCISION AND DRAINAGE OF WOUND N/A 8/16/2020    Procedure: INCISION AND DRAINAGE (I&D) HEAD/FACE;  Surgeon: Estrada Walton DMD;  Location: [facility];  Service: Maxillofacial    IR BIOPSY BONE MARROW  2/7/2019    REMOVAL OF IMPACTED TOOTH - COMPLETELY BONY N/A 8/16/2020    Procedure: EXTRACTION TEETH MULTIPLE #2, 3;  Surgeon: Estrada Walton DMD;  Location: [facility];  Service: Maxillofacial     Social History   Social History     Substance and Sexual Activity   Alcohol Use Yes     Social History     Substance and Sexual Activity   Drug Use Yes    Types: Cocaine     E-Cigarette/Vaping     E-Cigarette/Vaping Substances     Social History     Tobacco Use   Smoking Status Former   Smokeless Tobacco Never     Family History:   Family History   Problem Relation Age of Onset    Diabetes Mother     Hypertension Mother     Hyperlipidemia Father        Review of previous medical records was  completed.     Meds/Allergies   all current active meds have been reviewed, current meds:   Current Facility-Administered Medications   Medication Dose Route Frequency    acetaminophen (TYLENOL) tablet 650 mg  650 mg Oral Q6H PRN    chlorhexidine (PERIDEX) 0.12 % oral rinse 15 mL  15 mL Mouth/Throat Q12H JAISON    cholecalciferol (VITAMIN D3) tablet 400 Units  400 Units Oral Daily    enoxaparin (LOVENOX) subcutaneous injection 40 mg  40 mg Subcutaneous Daily    folic acid (FOLVITE) tablet 400 mcg  400 mcg Oral Daily     hydrALAZINE (APRESOLINE) injection 5 mg  5 mg Intravenous Q6H PRN    insulin glargine (LANTUS) subcutaneous injection 8 Units 0.08 mL  8 Units Subcutaneous HS    insulin lispro (HumaLOG) 100 units/mL subcutaneous injection 1-6 Units  1-6 Units Subcutaneous 4x Daily (AC & HS)    insulin lispro (HumaLOG) 100 units/mL subcutaneous injection 5 Units  5 Units Subcutaneous TID With Meals    lisinopril (ZESTRIL) tablet 10 mg  10 mg Oral Daily    senna (SENOKOT) tablet 8.6 mg  1 tablet Oral HS    sodium chloride (PF) 0.9 % injection 3 mL  3 mL Intravenous Q1H PRN    thiamine tablet 100 mg  100 mg Oral Daily   , and PTA meds:   Prior to Admission Medications   Prescriptions Last Dose Informant Patient Reported? Taking?   Blood Glucose Monitoring Suppl (Accu-Chek Guide) w/Device KIT Not Taking  No No   Sig: Use 3 (three) times a day   Patient not taking: Reported on 1/25/2024   Insulin Pen Needle (BD Pen Needle Claudia 2nd Gen) 32G X 4 MM MISC Not Taking  No No   Sig: For use with insulin pen. Pharmacy may dispense brand covered by insurance.   Patient not taking: Reported on 1/25/2024   Lancets (accu-chek multiclix) lancets Not Taking  No No   Sig: Check blood sugar 3 times a day   Patient not taking: Reported on 1/25/2024   glucose blood (OneTouch Verio) test strip Not Taking  No No   Sig: May substitute brand based on insurance coverage. Check glucose TID.   Patient not taking: Reported on 1/25/2024   insulin lispro (HumaLOG KwikPen) 100 units/mL injection pen Not Taking  No No   Sig: Inject 5 Units under the skin 3 (three) times a day with meals   Patient not taking: Reported on 1/25/2024   lidocaine (LIDODERM) 5 % Not Taking  No No   Sig: Apply 1 patch topically over 12 hours daily Remove & Discard patch within 12 hours or as directed by MD   Patient not taking: Reported on 1/25/2024   thiamine (VITAMIN B1) 100 mg tablet Not Taking  No No   Sig: Take 1 tablet (100 mg total) by mouth daily Do not start before January 3,  "2023.   Patient not taking: Reported on 10/17/2023      Facility-Administered Medications: None       No Known Allergies    Objective   Vitals:Blood pressure 150/86, pulse 80, temperature 97.9 °F (36.6 °C), temperature source Axillary, resp. rate 18, height 5' 9\" (1.753 m), weight 55.6 kg (122 lb 9.2 oz), SpO2 100%.,Body mass index is 18.1 kg/m².    Intake/Output Summary (Last 24 hours) at 1/27/2024 1357  Last data filed at 1/27/2024 1007  Gross per 24 hour   Intake 720 ml   Output 750 ml   Net -30 ml       Invasive Devices:   Invasive Devices       Peripheral Intravenous Line  Duration             Peripheral IV 01/25/24 Left Antecubital 2 days    Peripheral IV 01/25/24 Right;Upper Arm 2 days                    Physical Exam  Vitals and nursing note reviewed.   Eyes:      General: No scleral icterus.        Right eye: No discharge.         Left eye: No discharge.      Extraocular Movements: Extraocular movements intact and EOM normal.      Conjunctiva/sclera: Conjunctivae normal.   Cardiovascular:      Rate and Rhythm: Normal rate.   Pulmonary:      Effort: Pulmonary effort is normal.   Neurological:      Mental Status: He is alert.      Coordination: Finger-Nose-Finger Test normal.   Psychiatric:      Comments: Difficulty cooperating with exam; pt with difficulty with  and with his hearing        Neurologic Exam     Mental Status   Follows 1 step commands.   Attention: appropriately attends to provider.   Speech: (No dysarthria appreciated on exam. Speech fluent )  Level of consciousness: alert  - pt states he is at the hospital  - oriented to self  - pt not oriented to year, states it is 2025, then corrects to 2024 when told he stated the incorrect year  - oriented to month        Cranial Nerves     CN II   Visual acuity: (appears grossly intact)    CN III, IV, VI   Extraocular motions are normal.   Conjugate gaze: present    CN V   Facial sensation intact.     CN VII   Facial expression full, " symmetric.   Right facial weakness: none  Left facial weakness: none    CN VIII   Hearing impaired: difficult to assess, appears to have some degree of hearing loss.    CN XII   CN XII normal.   Tongue: not atrophic  Fasciculations: absent  Tongue deviation: none    Motor Exam   Strength to confrontation testing:   - b/l hand  5/5  - b/l elbow flexion and extension 5/5   - b/l shoulder abduction 5/5  - b/l plantar flexion and dorsi flexion 5/5  - b/l knee flexion and extension 5/5  - b/l hip flexion 5/5      Sensory Exam   Light touch normal.   Right arm light touch: normal  Left arm light touch: normal  Right leg light touch: normal  Left leg light touch: normal    Gait, Coordination, and Reflexes     Gait  Gait: (deferred for patient safety)    Coordination   Finger to nose coordination: normal      Lab Results: I have personally reviewed pertinent reports.  , CBC:   Results from last 7 days   Lab Units 01/26/24  0506 01/25/24  2356 01/25/24  1010   WBC Thousand/uL 9.19  --  10.35*   RBC Million/uL 3.72*  --  3.87*   HEMOGLOBIN g/dL 11.5*  --  12.0   HEMATOCRIT % 31.8*  --  35.1*   MCV fL 86  --  91   PLATELETS Thousands/uL 142* 154 138*   , BMP/CMP:   Results from last 7 days   Lab Units 01/27/24  0447 01/26/24  1714 01/26/24  1226 01/25/24  1845 01/25/24  1010   SODIUM mmol/L 134* 131* 134*   < > 124*   POTASSIUM mmol/L 3.9 3.9 3.4*   < > 3.6   CHLORIDE mmol/L 105 102 104   < > 88*   CO2 mmol/L 24 23 26   < > 27   BUN mg/dL 17 12 10   < > 8   CREATININE mg/dL 1.19 1.09 0.86   < > 1.39*   CALCIUM mg/dL 8.1* 7.9* 8.2*   < > 8.6   AST U/L  --   --   --   --  32   ALT U/L  --   --   --   --  56*   ALK PHOS U/L  --   --   --   --  103   EGFR ml/min/1.73sq m 69 77 99   < > 57    < > = values in this interval not displayed.   , Vitamin B12:   Results from last 7 days   Lab Units 01/25/24  1845   VITAMIN B 12 pg/mL 1,081*   , HgBA1C:   Results from last 7 days   Lab Units 01/26/24  0506   HEMOGLOBIN A1C % 15.7*   ,  "TSH:   Results from last 7 days   Lab Units 01/25/24  1427   TSH 3RD GENERATON uIU/mL 1.505   , Coagulation:   , Lipid Profile:   , Ammonia:   Results from last 7 days   Lab Units 01/25/24  1038   AMMONIA umol/L 31   , Urinalysis:   Results from last 7 days   Lab Units 01/26/24  0348   COLOR UA  Yellow   CLARITY UA  Clear   SPEC GRAV UA  1.020   PH UA  6.5   LEUKOCYTES UA  Negative   NITRITE UA  Negative   GLUCOSE UA mg/dl 500 (1/2%)*   KETONES UA mg/dl Negative   BILIRUBIN UA  Negative   BLOOD UA  Negative   , Drug Screen:   Results from last 7 days   Lab Units 01/26/24  0348   BARBITURATE UR  Negative   BENZODIAZEPINE UR  Negative   THC UR  Negative   COCAINE UR  Negative   METHADONE URINE  Negative   OPIATE UR  Negative   PCP UR  Negative   , Medication Drug Levels:       Invalid input(s): \"CARBAMAZEPINE\", \"LACOSAMIDE\", \"OXCARBAZEPINE\"  Imaging Studies: Attending neurologist personally reviewed reports and imaging in PACS of  [facility] wo contrast 1/25/24  EKG, Pathology, and Other Studies: Attending neurologist personally reviewed pertinent reports   VTE Prophylaxis: Enoxaparin (Lovenox)    Code Status: Level 1 - Full Code  Advance Directive and Living Will:      Power of :    POLST:      Counseling / Coordination of Care  Please see attending attestation for amount of time spent.     This note was completed in part utilizing Dragon Software.  Grammatical errors, random word insertions, spelling mistakes, and incomplete sentences may be an occasional consequence of this system secondary to software limitations, ambient noise, and hardware issues.  If you have any questions or concerns about the content, text, or information contained within the body of this dictation, please contact the provider for clarification.         "

## 2024-01-27 NOTE — PLAN OF CARE
Problem: Potential for Falls  Goal: Patient will remain free of falls  Description: INTERVENTIONS:  - Educate patient/family on patient safety including physical limitations  - Instruct patient to call for assistance with activity   - Consult OT/PT to assist with strengthening/mobility   - Keep Call bell within reach  - Keep bed low and locked with side rails adjusted as appropriate  - Keep care items and personal belongings within reach  - Initiate and maintain comfort rounds  - Make Fall Risk Sign visible to staff  - Offer Toileting every  Hours, in advance of need  - Initiate/Maintain alarm  - Obtain necessary fall risk management equipment:   Problem: PAIN - ADULT  Goal: Verbalizes/displays adequate comfort level or baseline comfort level  Description: Interventions:  - Encourage patient to monitor pain and request assistance  - Assess pain using appropriate pain scale  - Administer analgesics based on type and severity of pain and evaluate response  - Implement non-pharmacological measures as appropriate and evaluate response  - Consider cultural and social influences on pain and pain management  - Notify physician/advanced practitioner if interventions unsuccessful or patient reports new pain  Outcome: Progressing     Problem: INFECTION - ADULT  Goal: Absence or prevention of progression during hospitalization  Description: INTERVENTIONS:  - Assess and monitor for signs and symptoms of infection  - Monitor lab/diagnostic results  - Monitor all insertion sites, i.e. indwelling lines, tubes, and drains  - Monitor endotracheal if appropriate and nasal secretions for changes in amount and color  - Cummings appropriate cooling/warming therapies per order  - Administer medications as ordered  - Instruct and encourage patient and family to use good hand hygiene technique  - Identify and instruct in appropriate isolation precautions for identified infection/condition  Outcome: Progressing     Problem: SAFETY  ADULT  Goal: Patient will remain free of falls  Description: INTERVENTIONS:  - Educate patient/family on patient safety including physical limitations  - Instruct patient to call for assistance with activity   - Consult OT/PT to assist with strengthening/mobility   - Keep Call bell within reach  - Keep bed low and locked with side rails adjusted as appropriate  - Keep care items and personal belongings within reach  - Initiate and maintain comfort rounds  - Make Fall Risk Sign visible to staff  - Offer Toileting every  Hours, in advance of need  - Initiate/Maintain alarm  - Obtain necessary fall risk management equipment:   Problem: DISCHARGE PLANNING  Goal: Discharge to home or other facility with appropriate resources  Description: INTERVENTIONS:  - Identify barriers to discharge w/patient and caregiver  - Arrange for needed discharge resources and transportation as appropriate  - Identify discharge learning needs (meds, wound care, etc.)  - Arrange for interpretive services to assist at discharge as needed  - Refer to Case Management Department for coordinating discharge planning if the patient needs post-hospital services based on physician/advanced practitioner order or complex needs related to functional status, cognitive ability, or social support system  Outcome: Progressing     Problem: Knowledge Deficit  Goal: Patient/family/caregiver demonstrates understanding of disease process, treatment plan, medications, and discharge instructions  Description: Complete learning assessment and assess knowledge base.  Interventions:  - Provide teaching at level of understanding  - Provide teaching via preferred learning methods  Outcome: Progressing     - Apply yellow socks and bracelet for high fall risk patients  - Consider moving patient to room near nurses station  Outcome: Progressing     Problem: DISCHARGE PLANNING  Goal: Discharge to home or other facility with appropriate resources  Description:  INTERVENTIONS:  - Identify barriers to discharge w/patient and caregiver  - Arrange for needed discharge resources and transportation as appropriate  - Identify discharge learning needs (meds, wound care, etc.)  - Arrange for interpretive services to assist at discharge as needed  - Refer to Case Management Department for coordinating discharge planning if the patient needs post-hospital services based on physician/advanced practitioner order or complex needs related to functional status, cognitive ability, or social support system  Outcome: Progressing     - Apply yellow socks and bracelet for high fall risk patients  - Consider moving patient to room near nurses station  Outcome: Progressing

## 2024-01-29 NOTE — TELEPHONE ENCOUNTER
Called pt to schedule a hospital follow up and pt's father answered and stated pt will call back to schedule.

## 2024-01-30 LAB — VIT B1 BLD-SCNC: 161.8 NMOL/L (ref 66.5–200)

## 2024-01-30 NOTE — UTILIZATION REVIEW
NOTIFICATION OF ADMISSION DISCHARGE   This is a Notification of Discharge from [facility]. Please be advised that this patient has been discharge from our facility. Below you will find the admission and discharge date and time including the patient’s disposition.   UTILIZATION REVIEW CONTACT:  Katie Almeida MA  Utilization   Network Utilization Review Department  Phone: [phone] x carefully listen to the prompts. All voicemails are confidential.  Email: [email or web address]     ADMISSION INFORMATION  PRESENTATION DATE: 1/25/2024 10:06 AM  OBERVATION ADMISSION DATE:  INPATIENT ADMISSION DATE: 1/25/24  3:02 PM   DISCHARGE DATE: 1/27/2024  5:01 PM   DISPOSITION:Home/Self Care    Network Utilization Review Department  ATTENTION: Please call with any questions or concerns to [phone] and carefully listen to the prompts so that you are directed to the right person. All voicemails are confidential.   For Discharge needs, contact Care Management DC Support Team at [phone] opt. 2  Send all requests for admission clinical reviews, approved or denied determinations and any other requests to dedicated fax number below belonging to the campus where the patient is receiving treatment. List of dedicated fax numbers for the Facilities:  FACILITY NAME UR FAX NUMBER   ADMISSION DENIALS (Administrative/Medical Necessity) [phone]   DISCHARGE SUPPORT TEAM ([facility]) [phone]   PARENT CHILD HEALTH (Maternity/NICU/Pediatrics) [phone]   [facility] [phone]   [facility] [phone]   Critical access hospital [phone]   [facility] [phone]   [facility] [phone]   [facility] [phone]   [facility] [phone]   [facility]  [phone]   [facility] [phone]   [facility] [phone]   [facility] [phone]   [facility] [phone]

## 2024-02-01 ENCOUNTER — APPOINTMENT (EMERGENCY)
Dept: RADIOLOGY | Facility: HOSPITAL | Age: 54
DRG: 420 | End: 2024-02-01
Payer: COMMERCIAL

## 2024-02-01 ENCOUNTER — HOSPITAL ENCOUNTER (INPATIENT)
Facility: HOSPITAL | Age: 54
LOS: 1 days | Discharge: HOME | DRG: 420 | End: 2024-02-03
Attending: EMERGENCY MEDICINE | Admitting: INTERNAL MEDICINE
Payer: COMMERCIAL

## 2024-02-01 DIAGNOSIS — E87.6 HYPOKALEMIA: ICD-10-CM

## 2024-02-01 DIAGNOSIS — R79.89 ELEVATED BRAIN NATRIURETIC PEPTIDE (BNP) LEVEL: ICD-10-CM

## 2024-02-01 DIAGNOSIS — E16.2 HYPOGLYCEMIA: Primary | ICD-10-CM

## 2024-02-01 DIAGNOSIS — E88.09 HYPOALBUMINEMIA: ICD-10-CM

## 2024-02-01 DIAGNOSIS — D64.9 ANEMIA: ICD-10-CM

## 2024-02-01 DIAGNOSIS — E11.69 TYPE 2 DIABETES MELLITUS WITH OTHER SPECIFIED COMPLICATION, UNSPECIFIED WHETHER LONG TERM INSULIN USE (HCC): ICD-10-CM

## 2024-02-01 DIAGNOSIS — E83.51 HYPOCALCEMIA: ICD-10-CM

## 2024-02-01 DIAGNOSIS — R60.0 BILATERAL LOWER EXTREMITY EDEMA: ICD-10-CM

## 2024-02-01 LAB
2HR DELTA HS TROPONIN: -1 NG/L
ALBUMIN SERPL BCP-MCNC: 3 G/DL (ref 3.5–5)
ALP SERPL-CCNC: 51 U/L (ref 34–104)
ALT SERPL W P-5'-P-CCNC: 22 U/L (ref 7–52)
AMMONIA PLAS-SCNC: 24 UMOL/L (ref 18–72)
ANION GAP SERPL CALCULATED.3IONS-SCNC: 6 MMOL/L
AST SERPL W P-5'-P-CCNC: 18 U/L (ref 13–39)
ATRIAL RATE: 78 BPM
ATRIAL RATE: 85 BPM
BACTERIA UR QL AUTO: ABNORMAL /HPF
BASOPHILS # BLD AUTO: 0.05 THOUSANDS/ÂΜL (ref 0–0.1)
BASOPHILS NFR BLD AUTO: 0 % (ref 0–1)
BILIRUB SERPL-MCNC: 0.58 MG/DL (ref 0.2–1)
BILIRUB UR QL STRIP: NEGATIVE
BNP SERPL-MCNC: 473 PG/ML (ref 0–100)
BUN SERPL-MCNC: 9 MG/DL (ref 5–25)
CALCIUM ALBUM COR SERPL-MCNC: 8.8 MG/DL (ref 8.3–10.1)
CALCIUM SERPL-MCNC: 8 MG/DL (ref 8.4–10.2)
CARDIAC TROPONIN I PNL SERPL HS: 8 NG/L
CARDIAC TROPONIN I PNL SERPL HS: 9 NG/L
CHLORIDE SERPL-SCNC: 111 MMOL/L (ref 96–108)
CLARITY UR: CLEAR
CO2 SERPL-SCNC: 23 MMOL/L (ref 21–32)
COLOR UR: ABNORMAL
CREAT SERPL-MCNC: 1.03 MG/DL (ref 0.6–1.3)
EOSINOPHIL # BLD AUTO: 0.31 THOUSAND/ÂΜL (ref 0–0.61)
EOSINOPHIL NFR BLD AUTO: 2 % (ref 0–6)
ERYTHROCYTE [DISTWIDTH] IN BLOOD BY AUTOMATED COUNT: 11.4 % (ref 11.6–15.1)
GFR SERPL CREATININE-BSD FRML MDRD: 82 ML/MIN/1.73SQ M
GLUCOSE SERPL-MCNC: 152 MG/DL (ref 65–140)
GLUCOSE SERPL-MCNC: 163 MG/DL (ref 65–140)
GLUCOSE SERPL-MCNC: 184 MG/DL (ref 65–140)
GLUCOSE SERPL-MCNC: 25 MG/DL (ref 65–140)
GLUCOSE SERPL-MCNC: 85 MG/DL (ref 65–140)
GLUCOSE SERPL-MCNC: <20 MG/DL (ref 65–140)
GLUCOSE UR STRIP-MCNC: ABNORMAL MG/DL
HCT VFR BLD AUTO: 26.5 % (ref 36.5–49.3)
HGB BLD-MCNC: 9.5 G/DL (ref 12–17)
HGB UR QL STRIP.AUTO: NEGATIVE
HYALINE CASTS #/AREA URNS LPF: ABNORMAL /LPF
IMM GRANULOCYTES # BLD AUTO: 0.23 THOUSAND/UL (ref 0–0.2)
IMM GRANULOCYTES NFR BLD AUTO: 2 % (ref 0–2)
KETONES UR STRIP-MCNC: NEGATIVE MG/DL
LEUKOCYTE ESTERASE UR QL STRIP: NEGATIVE
LYMPHOCYTES # BLD AUTO: 4.33 THOUSANDS/ÂΜL (ref 0.6–4.47)
LYMPHOCYTES NFR BLD AUTO: 29 % (ref 14–44)
MAGNESIUM SERPL-MCNC: 1.8 MG/DL (ref 1.9–2.7)
MCH RBC QN AUTO: 32.6 PG (ref 26.8–34.3)
MCHC RBC AUTO-ENTMCNC: 35.8 G/DL (ref 31.4–37.4)
MCV RBC AUTO: 91 FL (ref 82–98)
MONOCYTES # BLD AUTO: 1.61 THOUSAND/ÂΜL (ref 0.17–1.22)
MONOCYTES NFR BLD AUTO: 11 % (ref 4–12)
MUCOUS THREADS UR QL AUTO: ABNORMAL
NEUTROPHILS # BLD AUTO: 8.63 THOUSANDS/ÂΜL (ref 1.85–7.62)
NEUTS SEG NFR BLD AUTO: 56 % (ref 43–75)
NITRITE UR QL STRIP: NEGATIVE
NON-SQ EPI CELLS URNS QL MICRO: ABNORMAL /HPF
NRBC BLD AUTO-RTO: 0 /100 WBCS
P AXIS: 79 DEGREES
P AXIS: 84 DEGREES
PH UR STRIP.AUTO: 5.5 [PH]
PLATELET # BLD AUTO: 325 THOUSANDS/UL (ref 149–390)
PMV BLD AUTO: 11.6 FL (ref 8.9–12.7)
POTASSIUM SERPL-SCNC: 2.7 MMOL/L (ref 3.5–5.3)
PR INTERVAL: 130 MS
PR INTERVAL: 148 MS
PROT SERPL-MCNC: 5.4 G/DL (ref 6.4–8.4)
PROT UR STRIP-MCNC: ABNORMAL MG/DL
QRS AXIS: 45 DEGREES
QRS AXIS: 60 DEGREES
QRSD INTERVAL: 100 MS
QRSD INTERVAL: 84 MS
QT INTERVAL: 400 MS
QT INTERVAL: 442 MS
QTC INTERVAL: 476 MS
QTC INTERVAL: 503 MS
RBC # BLD AUTO: 2.91 MILLION/UL (ref 3.88–5.62)
RBC #/AREA URNS AUTO: ABNORMAL /HPF
SODIUM SERPL-SCNC: 140 MMOL/L (ref 135–147)
SP GR UR STRIP.AUTO: 1.01 (ref 1–1.03)
T WAVE AXIS: -8 DEGREES
T WAVE AXIS: 36 DEGREES
TSH SERPL DL<=0.05 MIU/L-ACNC: 0.91 UIU/ML (ref 0.45–4.5)
UROBILINOGEN UR STRIP-ACNC: <2 MG/DL
VENTRICULAR RATE: 78 BPM
VENTRICULAR RATE: 85 BPM
WBC # BLD AUTO: 15.16 THOUSAND/UL (ref 4.31–10.16)
WBC #/AREA URNS AUTO: ABNORMAL /HPF

## 2024-02-01 PROCEDURE — 96375 TX/PRO/DX INJ NEW DRUG ADDON: CPT

## 2024-02-01 PROCEDURE — 84484 ASSAY OF TROPONIN QUANT: CPT

## 2024-02-01 PROCEDURE — 96365 THER/PROPH/DIAG IV INF INIT: CPT

## 2024-02-01 PROCEDURE — 96367 TX/PROPH/DG ADDL SEQ IV INF: CPT

## 2024-02-01 PROCEDURE — 83735 ASSAY OF MAGNESIUM: CPT

## 2024-02-01 PROCEDURE — 83880 ASSAY OF NATRIURETIC PEPTIDE: CPT

## 2024-02-01 PROCEDURE — 96368 THER/DIAG CONCURRENT INF: CPT

## 2024-02-01 PROCEDURE — 71045 X-RAY EXAM CHEST 1 VIEW: CPT

## 2024-02-01 PROCEDURE — 93005 ELECTROCARDIOGRAM TRACING: CPT

## 2024-02-01 PROCEDURE — 82948 REAGENT STRIP/BLOOD GLUCOSE: CPT

## 2024-02-01 PROCEDURE — 85025 COMPLETE CBC W/AUTO DIFF WBC: CPT

## 2024-02-01 PROCEDURE — 82140 ASSAY OF AMMONIA: CPT

## 2024-02-01 PROCEDURE — 81001 URINALYSIS AUTO W/SCOPE: CPT

## 2024-02-01 PROCEDURE — 36415 COLL VENOUS BLD VENIPUNCTURE: CPT

## 2024-02-01 PROCEDURE — 80053 COMPREHEN METABOLIC PANEL: CPT

## 2024-02-01 PROCEDURE — 99284 EMERGENCY DEPT VISIT MOD MDM: CPT

## 2024-02-01 PROCEDURE — 99285 EMERGENCY DEPT VISIT HI MDM: CPT | Performed by: EMERGENCY MEDICINE

## 2024-02-01 PROCEDURE — 84443 ASSAY THYROID STIM HORMONE: CPT

## 2024-02-01 RX ORDER — POTASSIUM CHLORIDE 14.9 MG/ML
20 INJECTION INTRAVENOUS ONCE
Status: COMPLETED | OUTPATIENT
Start: 2024-02-01 | End: 2024-02-02

## 2024-02-01 RX ORDER — DEXTROSE MONOHYDRATE 25 G/50ML
50 INJECTION, SOLUTION INTRAVENOUS ONCE
Status: COMPLETED | OUTPATIENT
Start: 2024-02-01 | End: 2024-02-01

## 2024-02-01 RX ORDER — POTASSIUM CHLORIDE 20 MEQ/1
40 TABLET, EXTENDED RELEASE ORAL ONCE
Status: COMPLETED | OUTPATIENT
Start: 2024-02-01 | End: 2024-02-01

## 2024-02-01 RX ADMIN — DEXTROSE MONOHYDRATE 50 ML: 25 INJECTION, SOLUTION INTRAVENOUS at 20:30

## 2024-02-01 RX ADMIN — THIAMINE HYDROCHLORIDE 100 MG: 100 INJECTION, SOLUTION INTRAMUSCULAR; INTRAVENOUS at 21:47

## 2024-02-01 RX ADMIN — FOLIC ACID 1 MG: 5 INJECTION, SOLUTION INTRAMUSCULAR; INTRAVENOUS; SUBCUTANEOUS at 21:30

## 2024-02-01 RX ADMIN — POTASSIUM CHLORIDE 20 MEQ: 14.9 INJECTION, SOLUTION INTRAVENOUS at 22:04

## 2024-02-01 RX ADMIN — POTASSIUM CHLORIDE 40 MEQ: 1500 TABLET, EXTENDED RELEASE ORAL at 21:54

## 2024-02-02 PROBLEM — R65.10 SIRS (SYSTEMIC INFLAMMATORY RESPONSE SYNDROME) (HCC): Status: ACTIVE | Noted: 2024-02-02

## 2024-02-02 LAB
ANION GAP SERPL CALCULATED.3IONS-SCNC: 4 MMOL/L
BASOPHILS # BLD AUTO: 0.04 THOUSANDS/ÂΜL (ref 0–0.1)
BASOPHILS NFR BLD AUTO: 0 % (ref 0–1)
BUN SERPL-MCNC: 9 MG/DL (ref 5–25)
CALCIUM SERPL-MCNC: 7.8 MG/DL (ref 8.4–10.2)
CHLORIDE SERPL-SCNC: 112 MMOL/L (ref 96–108)
CO2 SERPL-SCNC: 23 MMOL/L (ref 21–32)
CREAT SERPL-MCNC: 1.12 MG/DL (ref 0.6–1.3)
CREAT UR-MCNC: 39.6 MG/DL
EOSINOPHIL # BLD AUTO: 0.18 THOUSAND/ÂΜL (ref 0–0.61)
EOSINOPHIL NFR BLD AUTO: 1 % (ref 0–6)
ERYTHROCYTE [DISTWIDTH] IN BLOOD BY AUTOMATED COUNT: 11.6 % (ref 11.6–15.1)
GFR SERPL CREATININE-BSD FRML MDRD: 74 ML/MIN/1.73SQ M
GLUCOSE SERPL-MCNC: 122 MG/DL (ref 65–140)
GLUCOSE SERPL-MCNC: 123 MG/DL (ref 65–140)
GLUCOSE SERPL-MCNC: 135 MG/DL (ref 65–140)
GLUCOSE SERPL-MCNC: 22 MG/DL (ref 65–140)
GLUCOSE SERPL-MCNC: 255 MG/DL (ref 65–140)
GLUCOSE SERPL-MCNC: 267 MG/DL (ref 65–140)
GLUCOSE SERPL-MCNC: 284 MG/DL (ref 65–140)
GLUCOSE SERPL-MCNC: 35 MG/DL (ref 65–140)
GLUCOSE SERPL-MCNC: 41 MG/DL (ref 65–140)
GLUCOSE SERPL-MCNC: 42 MG/DL (ref 65–140)
GLUCOSE SERPL-MCNC: 65 MG/DL (ref 65–140)
GLUCOSE SERPL-MCNC: 73 MG/DL (ref 65–140)
GLUCOSE SERPL-MCNC: 75 MG/DL (ref 65–140)
GLUCOSE SERPL-MCNC: 86 MG/DL (ref 65–140)
GLUCOSE SERPL-MCNC: 98 MG/DL (ref 65–140)
HCT VFR BLD AUTO: 25.1 % (ref 36.5–49.3)
HGB BLD-MCNC: 8.8 G/DL (ref 12–17)
IMM GRANULOCYTES # BLD AUTO: 0.19 THOUSAND/UL (ref 0–0.2)
IMM GRANULOCYTES NFR BLD AUTO: 1 % (ref 0–2)
IRON SATN MFR SERPL: 44 % (ref 15–50)
IRON SERPL-MCNC: 83 UG/DL (ref 50–212)
LYMPHOCYTES # BLD AUTO: 4.58 THOUSANDS/ÂΜL (ref 0.6–4.47)
LYMPHOCYTES NFR BLD AUTO: 30 % (ref 14–44)
MAGNESIUM SERPL-MCNC: 2.3 MG/DL (ref 1.9–2.7)
MCH RBC QN AUTO: 31.9 PG (ref 26.8–34.3)
MCHC RBC AUTO-ENTMCNC: 35.1 G/DL (ref 31.4–37.4)
MCV RBC AUTO: 91 FL (ref 82–98)
MICROALBUMIN UR-MCNC: 423.7 MG/L
MICROALBUMIN/CREAT 24H UR: 1070 MG/G CREATININE (ref 0–30)
MONOCYTES # BLD AUTO: 1.4 THOUSAND/ÂΜL (ref 0.17–1.22)
MONOCYTES NFR BLD AUTO: 9 % (ref 4–12)
NEUTROPHILS # BLD AUTO: 8.8 THOUSANDS/ÂΜL (ref 1.85–7.62)
NEUTS SEG NFR BLD AUTO: 59 % (ref 43–75)
NRBC BLD AUTO-RTO: 0 /100 WBCS
PLATELET # BLD AUTO: 253 THOUSANDS/UL (ref 149–390)
PMV BLD AUTO: 10.3 FL (ref 8.9–12.7)
POTASSIUM SERPL-SCNC: 3.2 MMOL/L (ref 3.5–5.3)
RBC # BLD AUTO: 2.76 MILLION/UL (ref 3.88–5.62)
SODIUM SERPL-SCNC: 139 MMOL/L (ref 135–147)
TIBC SERPL-MCNC: 189 UG/DL (ref 250–450)
UIBC SERPL-MCNC: 106 UG/DL (ref 155–355)
WBC # BLD AUTO: 15.19 THOUSAND/UL (ref 4.31–10.16)

## 2024-02-02 PROCEDURE — 82948 REAGENT STRIP/BLOOD GLUCOSE: CPT

## 2024-02-02 PROCEDURE — 83540 ASSAY OF IRON: CPT | Performed by: INTERNAL MEDICINE

## 2024-02-02 PROCEDURE — 82746 ASSAY OF FOLIC ACID SERUM: CPT | Performed by: INTERNAL MEDICINE

## 2024-02-02 PROCEDURE — 82570 ASSAY OF URINE CREATININE: CPT | Performed by: INTERNAL MEDICINE

## 2024-02-02 PROCEDURE — 99222 1ST HOSP IP/OBS MODERATE 55: CPT

## 2024-02-02 PROCEDURE — 83550 IRON BINDING TEST: CPT | Performed by: INTERNAL MEDICINE

## 2024-02-02 PROCEDURE — 80048 BASIC METABOLIC PNL TOTAL CA: CPT

## 2024-02-02 PROCEDURE — 99232 SBSQ HOSP IP/OBS MODERATE 35: CPT | Performed by: INTERNAL MEDICINE

## 2024-02-02 PROCEDURE — 85025 COMPLETE CBC W/AUTO DIFF WBC: CPT

## 2024-02-02 PROCEDURE — 82607 VITAMIN B-12: CPT | Performed by: INTERNAL MEDICINE

## 2024-02-02 PROCEDURE — 82043 UR ALBUMIN QUANTITATIVE: CPT | Performed by: INTERNAL MEDICINE

## 2024-02-02 PROCEDURE — 83735 ASSAY OF MAGNESIUM: CPT

## 2024-02-02 RX ORDER — INSULIN LISPRO 100 [IU]/ML
1-6 INJECTION, SOLUTION INTRAVENOUS; SUBCUTANEOUS
Status: DISCONTINUED | OUTPATIENT
Start: 2024-02-02 | End: 2024-02-03 | Stop reason: HOSPADM

## 2024-02-02 RX ORDER — LISINOPRIL 10 MG/1
10 TABLET ORAL DAILY
Status: DISCONTINUED | OUTPATIENT
Start: 2024-02-02 | End: 2024-02-03 | Stop reason: HOSPADM

## 2024-02-02 RX ORDER — ACETAMINOPHEN 325 MG/1
650 TABLET ORAL EVERY 6 HOURS PRN
Status: DISCONTINUED | OUTPATIENT
Start: 2024-02-02 | End: 2024-02-03 | Stop reason: HOSPADM

## 2024-02-02 RX ORDER — INSULIN LISPRO 100 [IU]/ML
1-5 INJECTION, SOLUTION INTRAVENOUS; SUBCUTANEOUS
Status: DISCONTINUED | OUTPATIENT
Start: 2024-02-02 | End: 2024-02-03 | Stop reason: HOSPADM

## 2024-02-02 RX ORDER — FOLIC ACID 1 MG/1
1 TABLET ORAL DAILY
Status: DISCONTINUED | OUTPATIENT
Start: 2024-02-02 | End: 2024-02-03 | Stop reason: HOSPADM

## 2024-02-02 RX ORDER — MAGNESIUM SULFATE HEPTAHYDRATE 40 MG/ML
2 INJECTION, SOLUTION INTRAVENOUS ONCE
Status: COMPLETED | OUTPATIENT
Start: 2024-02-02 | End: 2024-02-02

## 2024-02-02 RX ORDER — LANOLIN ALCOHOL/MO/W.PET/CERES
100 CREAM (GRAM) TOPICAL DAILY
Status: DISCONTINUED | OUTPATIENT
Start: 2024-02-02 | End: 2024-02-03 | Stop reason: HOSPADM

## 2024-02-02 RX ORDER — INSULIN LISPRO 100 [IU]/ML
1-5 INJECTION, SOLUTION INTRAVENOUS; SUBCUTANEOUS
Status: DISCONTINUED | OUTPATIENT
Start: 2024-02-02 | End: 2024-02-02

## 2024-02-02 RX ORDER — INSULIN GLARGINE 100 [IU]/ML
10 INJECTION, SOLUTION SUBCUTANEOUS
Status: DISCONTINUED | OUTPATIENT
Start: 2024-02-02 | End: 2024-02-02

## 2024-02-02 RX ORDER — INSULIN LISPRO 100 [IU]/ML
5 INJECTION, SOLUTION INTRAVENOUS; SUBCUTANEOUS
Status: DISCONTINUED | OUTPATIENT
Start: 2024-02-02 | End: 2024-02-02

## 2024-02-02 RX ADMIN — LISINOPRIL 10 MG: 10 TABLET ORAL at 10:04

## 2024-02-02 RX ADMIN — Medication 1 TABLET: at 10:04

## 2024-02-02 RX ADMIN — MAGNESIUM SULFATE HEPTAHYDRATE 2 G: 40 INJECTION, SOLUTION INTRAVENOUS at 00:57

## 2024-02-02 RX ADMIN — INSULIN GLARGINE 10 UNITS: 100 INJECTION, SOLUTION SUBCUTANEOUS at 00:57

## 2024-02-02 RX ADMIN — FOLIC ACID 1 MG: 1 TABLET ORAL at 10:04

## 2024-02-02 RX ADMIN — THIAMINE HCL TAB 100 MG 100 MG: 100 TAB at 10:04

## 2024-02-02 NOTE — ASSESSMENT & PLAN NOTE
"Lab Results   Component Value Date    HGBA1C 15.7 (H) 01/26/2024       Recent Labs     02/01/24  2202 02/01/24  2257 02/01/24  2315 02/02/24  0205   POCGLU 152* 163* 184* 123       Blood Sugar Average: Last 72 hrs:  (P) 141.4  Patient with PMHx of DM2 and alcohol abuse presented to the ED with concern for generalized weakness, dizziness, and \"full body swelling.\"  Pt found to have blood glucose of < 20 on arrival  Given D50 in the ED with improvement  Pt reports no longer symptomatic  He does report that he took 25 units of his Lantus today. Pt educated on his insulin regimen which was adjusted when patient was last admitted to the hospital in the ICU 1/25-1/27 for HHS.   Pt was discharged on 10 units of Lantus h.s. and 5 units of lispro tid  Continue monitoring blood glucose levels for recurrent hypoglycemia  Reinitiate home insulin regimen once glucose stable with continued patient education on insulin regimen  "

## 2024-02-02 NOTE — ED NOTES
Patient noted to be drenched in sweat upon attempting to walk to bathroom. Patient ambulated back into bed. Provider notified.     Yoli Marie RN  02/01/24 2039

## 2024-02-02 NOTE — ASSESSMENT & PLAN NOTE
Pt fulfilling SIRS with mild tachycardia and leukocytosis of 15.16  Pt denies any infectious symptoms such as fever, chills, congestion, myalgias, or sore throat  Likely inflammatory rather than infectious etiology  Continue to monitor for signs of infection

## 2024-02-02 NOTE — ASSESSMENT & PLAN NOTE
"Pt reports that he drinks \"a lot of beer\" daily. When asked in the ED, he specified about 2 cases of beer a day. He reports that his last drink was about 3 days ago.  Monitor on CIWA protocol  Initiate folate, thiamine supplementation  Encourage cessation  "

## 2024-02-02 NOTE — ED PROVIDER NOTES
History  Chief Complaint   Patient presents with    Leg Swelling     Pt reports swelling all over body since yesterday c/o generalized weakness     53-year-old male with a reported past medical history of T2DM on insulin presenting to the ED with his father with concern for leg, hand and facial swelling which began yesterday.  No known inciting event.  Patient reports he intermittently has had lower leg swelling but uncertain why.  He denies a history of CHF.  He denies specific lip, tongue or throat swelling.  Has no difficulty breathing.  He has not been checking his sugars at home, states he last took insulin yesterday.  Patient states he was discharged from the hospital 5 days ago.  Per chart review he was seen here for altered mental status and was found to be in HHS and was admitted to the ICU.  Patient wanted to leave the hospital before he was evaluated by endocrinology.  He has no other specific complaints.  He has otherwise been eating and drinking normally.  Normal urination and bowel movements although did have 1 episode of nonbloody, nonmelanous diarrhea and 1 episode of NBNB emesis today.  He denies associated abdominal pain.  No cough, URI or sore throat.  No chest pain, shortness of breath, palpitations, lightheadedness or syncope.  No recent seizure activity.  Does have a headache but denies any complains of focal deficits.  Denies fevers, chills, cough, urinary complaints, and any other complaint today.  Reports he does drink alcohol, last was about 3 days ago.  Patient reports he drinks 2 boxes of beer a day.  When tried to further probe about how much alcohol patient states he just drinks a lot of alcohol.  He denies any illicit drug use.      [employer]  used however pt's father assisted as pt reports he was unable to hear well through the ipad.  Proceeded to have assistance from the ED Merry duff, who is bilingual who was able to help with the translation.              Prior  to Admission Medications   Prescriptions Last Dose Informant Patient Reported? Taking?   Alcohol Swabs 70 % PADS   No No   Sig: May substitute brand based on insurance coverage. Check glucose TID.   Blood Glucose Monitoring Suppl (Accu-Chek Guide) w/Device KIT   No No   Sig: Use 3 (three) times a day   Patient not taking: Reported on 1/25/2024   Blood Glucose Monitoring Suppl (OneTouch Verio Reflect) w/Device KIT   No No   Sig: May substitute brand based on insurance coverage. Check glucose TID.   Insulin Glargine Solostar (Lantus SoloStar) 100 UNIT/ML SOPN   No No   Sig: Inject 0.1 mL (10 Units total) under the skin daily at bedtime   Insulin Pen Needle (BD Pen Needle Claudia 2nd Gen) 32G X 4 MM MISC   No No   Sig: For use with insulin pen. Pharmacy may dispense brand covered by insurance.   Patient not taking: Reported on 1/25/2024   Insulin Pen Needle (BD Pen Needle Claudia 2nd Gen) 32G X 4 MM MISC   No No   Sig: For use with insulin pen. Pharmacy may dispense brand covered by insurance.   Insulin Pen Needle (BD Pen Needle Claudia 2nd Gen) 32G X 4 MM MISC   No No   Sig: For use with insulin pen. Pharmacy may dispense brand covered by insurance.   Lancets (accu-chek multiclix) lancets   No No   Sig: Check blood sugar 3 times a day   Patient not taking: Reported on 1/25/2024   Multiple Vitamin (multivitamin) tablet   No No   Sig: Take 1 tablet by mouth daily   OneTouch Delica Lancets 33G MISC   No No   Sig: May substitute brand based on insurance coverage. Check glucose TID.   glucose blood (OneTouch Verio) test strip   No No   Sig: May substitute brand based on insurance coverage. Check glucose TID.   insulin lispro (HumaLOG KwikPen) 100 units/mL injection pen   No No   Sig: Inject 5 Units under the skin 3 (three) times a day with meals   lisinopril (ZESTRIL) 10 mg tablet   No No   Sig: Take 1 tablet (10 mg total) by mouth daily      Facility-Administered Medications: None       Past Medical History:   Diagnosis Date     Alcohol abuse     Chronic pancreatitis (HCC)     Diabetes mellitus (HCC)     Type 2    Pancreatitis     Paroxysmal A-fib (HCC)     Thrombocytopenia (HCC)        Past Surgical History:   Procedure Laterality Date    INCISION AND DRAINAGE OF WOUND N/A 8/16/2020    Procedure: INCISION AND DRAINAGE (I&D) HEAD/FACE;  Surgeon: Estrada Walton DMD;  Location: [facility];  Service: Maxillofacial    IR BIOPSY BONE MARROW  2/7/2019    REMOVAL OF IMPACTED TOOTH - COMPLETELY BONY N/A 8/16/2020    Procedure: EXTRACTION TEETH MULTIPLE #2, 3;  Surgeon: Estrada Walton DMD;  Location: [facility];  Service: Maxillofacial       Family History   Problem Relation Age of Onset    Diabetes Mother     Hypertension Mother     Hyperlipidemia Father      I have reviewed and agree with the history as documented.    E-Cigarette/Vaping     E-Cigarette/Vaping Substances     Social History     Tobacco Use    Smoking status: Former    Smokeless tobacco: Never   Substance Use Topics    Alcohol use: Yes    Drug use: Yes     Types: Cocaine       Review of Systems   Constitutional:  Positive for diaphoresis.   Musculoskeletal:         (+) Hand swelling  (+) Leg selling   (+) Facial swelling   Neurological:  Positive for headaches.       Physical Exam  Physical Exam  Vitals and nursing note reviewed.   Constitutional:       General: He is not in acute distress.     Appearance: He is well-developed. He is diaphoretic. He is not ill-appearing or toxic-appearing.   HENT:      Head: Normocephalic and atraumatic.      Mouth/Throat:      Comments: MMM.  Oropharynx patent and clear.  Tongue midline.  Eyes:      Conjunctiva/sclera: Conjunctivae normal.   Cardiovascular:      Rate and Rhythm: Normal rate and regular rhythm.      Heart sounds: No murmur heard.  Pulmonary:      Effort: Pulmonary effort is normal. No respiratory distress.      Breath sounds: Normal breath sounds.   Abdominal:      Palpations: Abdomen is soft.      Tenderness: There is no abdominal  tenderness.   Musculoskeletal:         General: No swelling.      Cervical back: Neck supple.      Comments: +1 pitting edema up to the ankles bilaterally.  No associate erythema, weeping or skin disruption.  No other peripheral or central edema appreciated.  Specifically no edema over the hands or face.  Otherwise normal muscle tone and moving all extremities without issue.   Skin:     General: Skin is warm.      Capillary Refill: Capillary refill takes less than 2 seconds.   Neurological:      General: No focal deficit present.      Mental Status: He is alert.      GCS: GCS eye subscore is 4. GCS verbal subscore is 5. GCS motor subscore is 6.      Comments: A&O to person and place.   Psychiatric:         Mood and Affect: Mood normal.         Vital Signs  ED Triage Vitals [02/01/24 1841]   Temperature Pulse Respirations Blood Pressure SpO2   98.3 °F (36.8 °C) 89 17 132/62 100 %      Temp Source Heart Rate Source Patient Position - Orthostatic VS BP Location FiO2 (%)   Oral Monitor Lying Right arm --      Pain Score       No Pain           Vitals:    02/01/24 1841 02/01/24 2130 02/01/24 2200   BP: 132/62 (!) 194/81 152/73   Pulse: 89 94 80   Patient Position - Orthostatic VS: Lying  Lying         Visual Acuity      ED Medications  Medications   potassium chloride 20 mEq IVPB (premix) (20 mEq Intravenous New Bag 2/1/24 2204)   thiamine (VITAMIN B1) 100 mg in sodium chloride 0.9 % 50 mL IVPB (100 mg Intravenous New Bag 2/1/24 2147)   folic acid 1 mg in sodium chloride 0.9 % 50 mL IVPB (0 mg Intravenous Stopped 2/1/24 2153)   dextrose 50 % IV solution 50 mL (50 mL Intravenous Given 2/1/24 2030)   potassium chloride (Klor-Con M20) CR tablet 40 mEq (40 mEq Oral Given 2/1/24 2154)       Diagnostic Studies  Results Reviewed       Procedure Component Value Units Date/Time    Fingerstick Glucose (POCT) [[id number]]  (Abnormal) Collected: 02/01/24 2202    Lab Status: Final result Updated: 02/01/24 2205     POC Glucose 152  mg/dl     Comprehensive metabolic panel [[id number]]  (Abnormal) Collected: 02/01/24 2032    Lab Status: Final result Specimen: Blood from Arm, Left Updated: 02/01/24 2136     Sodium 140 mmol/L      Potassium 2.7 mmol/L      Chloride 111 mmol/L      CO2 23 mmol/L      ANION GAP 6 mmol/L      BUN 9 mg/dL      Creatinine 1.03 mg/dL      Glucose 25 mg/dL      Calcium 8.0 mg/dL      Corrected Calcium 8.8 mg/dL      AST 18 U/L      ALT 22 U/L      Alkaline Phosphatase 51 U/L      Total Protein 5.4 g/dL      Albumin 3.0 g/dL      Total Bilirubin 0.58 mg/dL      eGFR 82 ml/min/1.73sq m     Narrative:      [employer] guidelines for Chronic Kidney Disease (CKD):     Stage 1 with normal or high GFR (GFR > 90 mL/min/1.73 square meters)    Stage 2 Mild CKD (GFR = 60-89 mL/min/1.73 square meters)    Stage 3A Moderate CKD (GFR = 45-59 mL/min/1.73 square meters)    Stage 3B Moderate CKD (GFR = 30-44 mL/min/1.73 square meters)    Stage 4 Severe CKD (GFR = 15-29 mL/min/1.73 square meters)    Stage 5 End Stage CKD (GFR <15 mL/min/1.73 square meters)  Note: GFR calculation is accurate only with a steady state creatinine    B-Type Natriuretic Peptide(BNP) [[id number]]  (Abnormal) Collected: 02/01/24 2032    Lab Status: Final result Specimen: Blood from Arm, Left Updated: 02/01/24 2133      pg/mL     Magnesium [[id number]]  (Abnormal) Collected: 02/01/24 2032    Lab Status: Final result Specimen: Blood from Arm, Left Updated: 02/01/24 2132     Magnesium 1.8 mg/dL     Ammonia [[id number]]  (Normal) Collected: 02/01/24 2032    Lab Status: Final result Specimen: Blood from Arm, Left Updated: 02/01/24 2129     Ammonia 24 umol/L     TSH, 3rd generation with Free T4 reflex [[id number]]  (Normal) Collected: 02/01/24 2032    Lab Status: Final result Specimen: Blood from Arm, Left Updated: 02/01/24 2125     TSH 3RD GENERATON 0.910 uIU/mL     HS Troponin I 2hr [[id number]]     Lab Status: No result Specimen: Blood     HS  Troponin 0hr (reflex protocol) [[id number]]  (Normal) Collected: 02/01/24 2032    Lab Status: Final result Specimen: Blood from Arm, Left Updated: 02/01/24 2116     hs TnI 0hr 9 ng/L     CBC and differential [[id number]]  (Abnormal) Collected: 02/01/24 2032    Lab Status: Final result Specimen: Blood from Arm, Left Updated: 02/01/24 2112     WBC 15.16 Thousand/uL      RBC 2.91 Million/uL      Hemoglobin 9.5 g/dL      Hematocrit 26.5 %      MCV 91 fL      MCH 32.6 pg      MCHC 35.8 g/dL      RDW 11.4 %      MPV 11.6 fL      Platelets 325 Thousands/uL      nRBC 0 /100 WBCs      Neutrophils Relative 56 %      Immat GRANS % 2 %      Lymphocytes Relative 29 %      Monocytes Relative 11 %      Eosinophils Relative 2 %      Basophils Relative 0 %      Neutrophils Absolute 8.63 Thousands/µL      Immature Grans Absolute 0.23 Thousand/uL      Lymphocytes Absolute 4.33 Thousands/µL      Monocytes Absolute 1.61 Thousand/µL      Eosinophils Absolute 0.31 Thousand/µL      Basophils Absolute 0.05 Thousands/µL     Fingerstick Glucose (POCT) [[id number]]  (Normal) Collected: 02/01/24 2046    Lab Status: Final result Updated: 02/01/24 2048     POC Glucose 85 mg/dl     Fingerstick Glucose (POCT) [[id number]]  (Abnormal) Collected: 02/01/24 2023    Lab Status: Final result Updated: 02/01/24 2027     POC Glucose <20 mg/dl     UA (URINE) with reflex to Scope [[id number]]     Lab Status: No result Specimen: Urine                    XR chest 1 view portable   ED Interpretation by Dmitri Mckeon PA-C (02/01 2117)   ED wet read:  No acute cardiopulmonary disease appreciated.                   Procedures  ECG 12 Lead Documentation Only    Date/Time: 2/1/2024 9:17 PM    Performed by: Dmitri Mckeon PA-C  Authorized by: Dmitri Mckeon PA-C    Indications / Diagnosis:  Cardiac w/u  ECG reviewed by me, the ED Provider: yes    Patient location:  ED  Rate:     ECG rate:  78    ECG rate assessment: normal    Rhythm:     Rhythm: sinus  rhythm    Ectopy:     Ectopy: none    QRS:     QRS axis:  Normal    QRS intervals:  Normal  Conduction:     Conduction: normal    ST segments:     ST segments:  Non-specific  T waves:     T waves: inverted      Inverted:  V3  Other findings:     Other findings: prolonged qTc interval    Comments:      Read as prolonged QTc-503 MS.           ED Course  ED Course as of 02/01/24 2230   Thu Feb 01, 2024 2026 Per verbal communication from ED tech patient's glucose was less than 20.  D50 ordered.   2113 WBC(!): 15.16  Patient with no complaints today that would indicate infectious process.   2114 Hemoglobin(!): 9.5  Lower than patient's previous values.     2119 hs TnI 0hr: 9  EKG did not appear ischemic.  Patient without any specific complaints of chest pain or shortness of breath.   2147 Potassium(!!): 2.7  Will replete     2147 GLUCOSE(!!): 25  Drawn at time of initial finger stick    2147 Albumin(!): 3.0   2147 Total Protein(!): 5.4   2156 TT sent to [facility] for admission.   2218 [facility] accepts pt for obs under Dr. Stringer. Pt stable at admission     2218 POC Glucose(!): 152   2218 Ammonia: 24   2219 TSH 3RD GENERATON: 0.910   2219 Blood Pressure: 152/73   2219 Pulse: 80   2219 Respirations: 14   2219 SpO2: 100 %                                             Medical Decision Making  53-year-old male presents ED with complaints of swelling over his legs, hands and face since yesterday.  States he sometimes gets lower extremity swelling but uncertain why.  He was just admitted for [facility] however left before he was seen by endocrinology.  States he last took his insulin yesterday.  Has not been checking his sugars today.  He is otherwise been maintaining p.o. intake today.  He did have 1 episode of vomiting 1 episode of diarrhea today otherwise no other specific complaints.  PE findings as outlined in the PE section.  VSS.  Patient was diaphoretic.  He does have some lower extremity edema bilaterally however no other specific  edema appreciated.  A&O x 2.  GCS 15.  No focal neurologic deficits.  PE otherwise unremarkable.  In the patient's presenting complaints we will check a POCT glucose.  Will check a CBC for anemia and marked leukocytosis.  CMP for liver enzymes, electrolytes and renal function as well as albumin.  Will check a magnesium to ensure no other electrolyte abnormalities.  Will check a TSH in light of the diaphoresis and peripheral edema.  BNP to assess for heart failure although patient without any specific complaint of chest pain, shortness of breath or history of CHF.  EKG/troponin to ensure no signs of ACS/MI/arrhythmia as possible cause of patient's complaints.  CXR in light of cardiac workup.  Ammonia in light of possible mild altered mental status although no acute findings other than A&O x 2.  UA to ensure no signs of urinary tract infection and to assess for proteinuria.  Patient denies specific urinary complaints.  In light of patient's alcohol use will give thiamine and folic acid.  Patient will likely require admission however will reevaluate after above-stated plan.      Amount and/or Complexity of Data Reviewed  Labs: ordered. Decision-making details documented in ED Course.  Radiology: ordered and independent interpretation performed.    Risk  Prescription drug management.  Decision regarding hospitalization.             Disposition  Final diagnoses:   Hypoglycemia   Hypokalemia   Hypocalcemia   Hypoalbuminemia   Anemia   Elevated brain natriuretic peptide (BNP) level   Bilateral lower extremity edema     Time reflects when diagnosis was documented in both MDM as applicable and the Disposition within this note       Time User Action Codes Description Comment    2/1/2024  9:47 PM Dmitri Mckeon Add [E16.2] Hypoglycemia     2/1/2024  9:48 PM Dmitri Mckeon Add [E87.6] Hypokalemia     2/1/2024  9:48 PM Dmitri Mckeon Add [E83.51] Hypocalcemia     2/1/2024  9:48 PM Dmitri Mckeon Add [E88.09]  Hypoalbuminemia     2/1/2024  9:48 PM Dmitri Mckeon Add [D64.9] Anemia     2/1/2024  9:48 PM Dmitri Mckeon Add [R79.89] Elevated brain natriuretic peptide (BNP) level     2/1/2024  9:49 PM Dmitri Mckeon Add [R60.0] Bilateral lower extremity edema           ED Disposition       ED Disposition   Admit    Condition   Stable    Date/Time   Thu Feb 1, 2024  9:47 PM    Comment   Case was discussed with EUGENIO and the patient's admission status was agreed to be Admission Status: observation status to the service of Dr. jennifer Dunlap               Follow-up Information    None         Patient's Medications   Discharge Prescriptions    No medications on file       No discharge procedures on file.    PDMP Review         Value Time User    PDMP Reviewed  Yes 8/18/2020 11:39 AM Anthony Avila MD            ED Provider  Electronically Signed by             Dmitri Mckeon PA-C  02/01/24 5307

## 2024-02-02 NOTE — H&P
"[facility]  H&P  Name: Wes Araujo 53 y.o. male I MRN: [id number]  Unit/Bed#: [place] [address] -01 I Date of Admission: 2/1/2024   Date of Service: 2/2/2024 I Hospital Day: 0      Assessment/Plan   * Type 2 diabetes mellitus with hypoglycemia, with long-term current use of insulin (HCC)  Assessment & Plan  Lab Results   Component Value Date    HGBA1C 15.7 (H) 01/26/2024       Recent Labs     02/01/24  2202 02/01/24  2257 02/01/24  2315 02/02/24  0205   POCGLU 152* 163* 184* 123       Blood Sugar Average: Last 72 hrs:  (P) 141.4  Patient with PMHx of DM2 and alcohol abuse presented to the ED with concern for generalized weakness, dizziness, and \"full body swelling.\"  Pt found to have blood glucose of < 20 on arrival  Given D50 in the ED with improvement  Pt reports no longer symptomatic  He does report that he took 25 units of his Lantus today. Pt educated on his insulin regimen which was adjusted when patient was last admitted to the hospital in the ICU 1/25-1/27 for HHS.   Pt was discharged on 10 units of Lantus h.s. and 5 units of lispro tid  Continue monitoring blood glucose levels for recurrent hypoglycemia  Reinitiate home insulin regimen once glucose stable with continued patient education on insulin regimen    Hypokalemia  Assessment & Plan  POA K 2.7, repleted  Monitor on telemetry  Continue to monitor BMP    Elevated brain natriuretic peptide (BNP) level  Assessment & Plan  Pt reporting concern for \"full body swelling\" especially in his face, arms, and legs since yesterday. Pt denies any lip, tongue, throat swelling or hives. He denies any chest pain, shortness of breath, or orthopnea. On exam, no swelling noted.   BNP obtained by the ED elevated at 473  CXR without any acute cardiopulm abnormalities  EKG without acute ischemic changes  Trops 9->8  Daily weights, I/O  Continue to monitor for evidence of volume overload    SIRS (systemic inflammatory response syndrome) " "(HCC)  Assessment & Plan  Pt fulfilling SIRS with mild tachycardia and leukocytosis of 15.16  Pt denies any infectious symptoms such as fever, chills, congestion, myalgias, or sore throat  Likely inflammatory rather than infectious etiology  Continue to monitor for signs of infection    Uncomplicated alcohol dependence (HCC)  Assessment & Plan  Pt reports that he drinks \"a lot of beer\" daily. When asked in the ED, he specified about 2 cases of beer a day. He reports that his last drink was about 3 days ago.  Monitor on CIWA protocol  Initiate folate, thiamine supplementation  Encourage cessation           VTE Pharmacologic Prophylaxis: VTE Score: 1 Low Risk (Score 0-2) - Encourage Ambulation.  Code Status: Level 1 - Full Code   Discussion with family: Update in AM    Anticipated Length of Stay: Patient will be admitted on an observation basis with an anticipated length of stay of less than 2 midnights secondary to hypoglycemia.    Total Time Spent on Date of Encounter in care of patient: 65 minutes This time was spent on one or more of the following: performing physical exam; counseling and coordination of care; obtaining or reviewing history; documenting in the medical record; reviewing/ordering tests, medications or procedures; communicating with other healthcare professionals and discussing with patient's family/caregivers.    Chief Complaint: \"I had swelling of my whole body\"    History of Present Illness:  Wes Araujo is a 53 y.o. male who presents with hypoglycemia. History obtained with assistance from translating services.  Patient reports yesterday he began with whole body swelling of his face, arms, and legs.  When he became fatigued and dizzy, he became concerned and came to the ED.  On arrival to the ED patient's blood sugar was found to be less than 20.  He reports that he took 25 units of his Lantus today.  Patient reports that he was recently admitted to the hospital about 5 days ago for his blood " "sugar.  Patient with no apparent swelling on exam.  He denies any swelling of his tongue, lips, or throat.  He denies any associated rash.  He denies any shortness of breath, chest pain, orthopnea, diaphoresis, or nausea/vomiting.  When asked about drinking, patient reports that he \"drinks a lot of beer\" with his last drink being about 3 days ago.  He reported in the ED that he drinks about 2 cases of beer a day.  Patient seems to have poor understanding of his insulin regimen and would benefit from continued education.    Review of Systems:  Review of Systems   Constitutional:  Negative for appetite change, chills, diaphoresis, fatigue and fever.   HENT:  Negative for congestion, rhinorrhea and sore throat.    Eyes:  Negative for photophobia and visual disturbance.   Respiratory:  Negative for cough, shortness of breath and wheezing.    Cardiovascular:  Negative for chest pain, palpitations and leg swelling.   Gastrointestinal:  Negative for abdominal distention, abdominal pain, blood in stool, constipation, diarrhea, nausea and vomiting.   Genitourinary:  Negative for dysuria and hematuria.   Musculoskeletal:  Negative for arthralgias, back pain, myalgias and neck stiffness.        \"Whole body swelling\" reported of face, arms, and legs, not observed   Skin:  Negative for color change and rash.   Neurological:  Positive for dizziness and weakness (generalized). Negative for seizures, syncope, light-headedness and headaches.   Psychiatric/Behavioral:  Negative for agitation, behavioral problems and confusion. The patient is not nervous/anxious.    All other systems reviewed and are negative.      Past Medical and Surgical History:   Past Medical History:   Diagnosis Date    Alcohol abuse     Chronic pancreatitis (HCC)     Diabetes mellitus (HCC)     Type 2    Pancreatitis     Paroxysmal A-fib (HCC)     Thrombocytopenia (HCC)        Past Surgical History:   Procedure Laterality Date    INCISION AND DRAINAGE OF WOUND " N/A 8/16/2020    Procedure: INCISION AND DRAINAGE (I&D) HEAD/FACE;  Surgeon: Estrada Walton DMD;  Location: [facility];  Service: Maxillofacial    IR BIOPSY BONE MARROW  2/7/2019    REMOVAL OF IMPACTED TOOTH - COMPLETELY BONY N/A 8/16/2020    Procedure: EXTRACTION TEETH MULTIPLE #2, 3;  Surgeon: Estrada Walton DMD;  Location: [facility];  Service: Maxillofacial       Meds/Allergies:  Prior to Admission medications    Medication Sig Start Date End Date Taking? Authorizing Provider   Alcohol Swabs 70 % PADS May substitute brand based on insurance coverage. Check glucose TID. 1/27/24   Julio Mcneil MD   Blood Glucose Monitoring Suppl (Accu-Chek Guide) w/Device KIT Use 3 (three) times a day  Patient not taking: Reported on 1/25/2024 9/4/22   Mojgan Young MD   Blood Glucose Monitoring Suppl (OneTouch Verio Reflect) w/Device KIT May substitute brand based on insurance coverage. Check glucose TID. 1/27/24   Julio Mcneil MD   glucose blood (OneTouch Verio) test strip May substitute brand based on insurance coverage. Check glucose TID. 1/27/24   Julio Mcneil MD   Insulin Glargine Solostar (Lantus SoloStar) 100 UNIT/ML SOPN Inject 0.1 mL (10 Units total) under the skin daily at bedtime 1/27/24   Julio Mcneil MD   insulin lispro (HumaLOG KwikPen) 100 units/mL injection pen Inject 5 Units under the skin 3 (three) times a day with meals 1/27/24   Julio Mcneil MD   Insulin Pen Needle (BD Pen Needle Claudia 2nd Gen) 32G X 4 MM MISC For use with insulin pen. Pharmacy may dispense brand covered by insurance.  Patient not taking: Reported on 1/25/2024 10/17/23   Isma Mejia MD   Insulin Pen Needle (BD Pen Needle Claudia 2nd Gen) 32G X 4 MM MISC For use with insulin pen. Pharmacy may dispense brand covered by insurance. 1/27/24   Julio Mcneil MD   Insulin Pen Needle (BD Pen Needle Claudia 2nd Gen) 32G X 4 MM MISC For use with insulin pen. Pharmacy may dispense brand covered by insurance. 1/27/24   Julio Mcneil MD   Lancets  (accu-chek multiclix) lancets Check blood sugar 3 times a day  Patient not taking: Reported on 1/25/2024 10/17/23   Isma Mejia MD   lisinopril (ZESTRIL) 10 mg tablet Take 1 tablet (10 mg total) by mouth daily 1/28/24 2/27/24  Julio Mcneil MD   Multiple Vitamin (multivitamin) tablet Take 1 tablet by mouth daily 1/27/24 2/26/24  Julio Mcneil MD   OneTouch Delica Lancets 33G MISC May substitute brand based on insurance coverage. Check glucose TID. 1/27/24   Julio Mcneil MD   metFORMIN (GLUCOPHAGE) 500 mg tablet Take 1 tablet (500 mg total) by mouth 2 (two) times a day with meals 7/30/22 7/30/22  Phong Sy MD     I have reviewed home medications using recent Epic encounter.    Allergies: No Known Allergies    Social History:  Marital Status: Single   Patient Pre-hospital Living Situation: Home  Patient Pre-hospital Level of Mobility: walks  Patient Pre-hospital Diet Restrictions: Diabetic  Substance Use History:   Social History     Substance and Sexual Activity   Alcohol Use Yes     Social History     Tobacco Use   Smoking Status Former   Smokeless Tobacco Never     Social History     Substance and Sexual Activity   Drug Use Yes    Types: Cocaine       Family History:  Family History   Problem Relation Age of Onset    Diabetes Mother     Hypertension Mother     Hyperlipidemia Father        Physical Exam:     Vitals:   Blood Pressure: 161/79 (02/02/24 0106)  Pulse: 90 (02/02/24 0106)  Temperature: 97.5 °F (36.4 °C) (02/01/24 2312)  Temp Source: Oral (02/01/24 2130)  Respirations: 14 (02/01/24 2200)  Weight - Scale: 65.1 kg (143 lb 8.3 oz) (02/02/24 0013)  SpO2: 100 % (02/02/24 0106)    Physical Exam  Vitals and nursing note reviewed.   Constitutional:       General: He is not in acute distress.     Appearance: He is well-developed. He is not ill-appearing.   HENT:      Head: Normocephalic and atraumatic.      Nose: Nose normal. No congestion.      Mouth/Throat:      Mouth: Mucous membranes are moist.       Pharynx: Oropharynx is clear.   Eyes:      Conjunctiva/sclera: Conjunctivae normal.   Cardiovascular:      Rate and Rhythm: Normal rate and regular rhythm.      Heart sounds: Normal heart sounds. No murmur heard.     No friction rub. No gallop.   Pulmonary:      Effort: Pulmonary effort is normal. No respiratory distress.      Breath sounds: Normal breath sounds. No wheezing, rhonchi or rales.   Abdominal:      General: Bowel sounds are normal. There is no distension.      Palpations: Abdomen is soft.      Tenderness: There is no abdominal tenderness.   Musculoskeletal:      Cervical back: Neck supple.      Right lower leg: No edema.      Left lower leg: No edema.   Skin:     General: Skin is warm and dry.      Capillary Refill: Capillary refill takes less than 2 seconds.   Neurological:      General: No focal deficit present.      Mental Status: He is alert and oriented to person, place, and time. Mental status is at baseline.   Psychiatric:         Mood and Affect: Mood normal.         Behavior: Behavior normal.          Additional Data:     Lab Results:  Results from last 7 days   Lab Units 02/01/24 2032   WBC Thousand/uL 15.16*   HEMOGLOBIN g/dL 9.5*   HEMATOCRIT % 26.5*   PLATELETS Thousands/uL 325   NEUTROS PCT % 56   LYMPHS PCT % 29   MONOS PCT % 11   EOS PCT % 2     Results from last 7 days   Lab Units 02/01/24 2032   SODIUM mmol/L 140   POTASSIUM mmol/L 2.7*   CHLORIDE mmol/L 111*   CO2 mmol/L 23   BUN mg/dL 9   CREATININE mg/dL 1.03   ANION GAP mmol/L 6   CALCIUM mg/dL 8.0*   ALBUMIN g/dL 3.0*   TOTAL BILIRUBIN mg/dL 0.58   ALK PHOS U/L 51   ALT U/L 22   AST U/L 18   GLUCOSE RANDOM mg/dL 25*         Results from last 7 days   Lab Units 02/02/24  0205 02/01/24  2315 02/01/24  2257 02/01/24  2202 02/01/24  2046 02/01/24  2023 01/27/24  1155 01/27/24  0911 01/26/24  2114 01/26/24  1611 01/26/24  1138 01/26/24  0748   POC GLUCOSE mg/dl 123 184* 163* 152* 85 <20* 74 171* 183* 326* 144* 203*     Results from  last 7 days   Lab Units 01/26/24  0506   HEMOGLOBIN A1C % 15.7*           Lines/Drains:  Invasive Devices       Peripheral Intravenous Line  Duration             Peripheral IV 02/01/24 Left Antecubital <1 day                        Imaging: Personally reviewed the following imaging: chest xray  XR chest 1 view portable   ED Interpretation by Dmitri Mckeon PA-C (02/01 2117)   ED wet read:  No acute cardiopulmonary disease appreciated.            EKG and Other Studies Reviewed on Admission:   EKG: NSR. HR 85.    ** Please Note: This note has been constructed using a voice recognition system. **

## 2024-02-02 NOTE — CASE MANAGEMENT
Case Management Discharge Planning Note    Patient name Wes Araujo  Location [facility] M* MRN [id number]  : 1970 Date 2024       Current Admission Date: 2024  Current Admission Diagnosis:Type 2 diabetes mellitus with hypoglycemia, with long-term current use of insulin (HCC)   Patient Active Problem List    Diagnosis Date Noted    SIRS (systemic inflammatory response syndrome) (HCC) 2024    Alcohol abuse 2024    Elevated brain natriuretic peptide (BNP) level 2024    Type 2 diabetes mellitus (HCC) 2024    Chronic low back pain 2023    Alcohol withdrawal (HCC) 2023    Diarrhea 2023    Seizure (HCC) 2022    Illicit drug use 2022    Leukocytosis 2022    Hypothermia 2022    Cocaine use 2022    Abnormal chest x-ray 2022    Bilateral hearing loss 2020    Hypoglycemia 2020    Hypokalemia 2020    CKD (chronic kidney disease), stage III (HCC) 2019    Anemia 2019    Hyponatremia 2019    History of atrial fibrillation 2019    Acute encephalopathy 2019    Alcohol intoxication in active alcoholic with complication (HCC) 2016    Essential hypertension     Type 2 diabetes mellitus with hypoglycemia, with long-term current use of insulin (HCC)     Uncomplicated alcohol dependence (HCC)     Paroxysmal A-fib (HCC)     Chronic pancreatitis (HCC)     Thrombocytopenia (HCC)       LOS (days): 0  Geometric Mean LOS (GMLOS) (days):   Days to GMLOS:     OBJECTIVE:            Current admission status: Observation   Preferred Pharmacy:    [facility] [place] - [address] [place] [id number]  Phone: [phone] Fax: [phone]    [facility] [place] [address],  [address],  [address]ound Floor [facility] [address]  Phone: [phone] Fax: [phone]    Primary Care Provider: No primary care  provider on file.    Primary Insurance: "[employer]  Secondary Insurance:     DISCHARGE DETAILS:                                          Other Referral/Resources/Interventions Provided:  Interventions: PCP, Outpatient  (Pt a 30D readmission without a PCP- SW Chantel contact info and instructions for insurance prior to an appointment placed on AVS- referred for OPCM to follow in community as well)

## 2024-02-02 NOTE — PROGRESS NOTES
"[facility]  Progress Note  Name: Wes Araujo I  MRN: [id number]  Unit/Bed#: [place] [address] -01 I Date of Admission: 2/1/2024   Date of Service: 2/2/2024 I Hospital Day: 0    Assessment/Plan   SIRS (systemic inflammatory response syndrome) ([facility])  Assessment & Plan  Pt fulfilling SIRS with mild tachycardia and leukocytosis of 15.16  Pt denies any infectious symptoms such as fever, chills, congestion, myalgias, or sore throat  Undoubtedly, this is related to the physiologic stress of hypoglycemia.    Hypokalemia  Assessment & Plan  POA K 2.7, repleted  Potassium has improved.  Continue to monitor BMP    Uncomplicated alcohol dependence ([facility])  Assessment & Plan  Pt reports that he drinks \"a lot of beer\" daily. When asked in the ED, he specified about 2 cases of beer a day. He reports that his last drink was about 3 days ago.  Monitor on CIWA protocol  Initiate folate, thiamine supplementation  Encourage cessation    * Type 2 diabetes mellitus with hypoglycemia, with long-term current use of insulin ([facility])  Assessment & Plan  Lab Results   Component Value Date    HGBA1C 15.7 (H) 01/26/2024       Recent Labs     02/02/24  1001 02/02/24  1208 02/02/24  1408 02/02/24  1537   POCGLU 65 98 86 35*     The patient was admitted with hypoglycemia.  His blood sugar was 20.  Apparently, the patient took 25 of Lantus although he was discharged from the hospital on last on 10 units.  Sugars have been on the low side through the day.  He just had a sugar of 35.    In light of this, I am reluctant to discharge him.  We will continue to monitor his glucose without pharmacologic intervention.  The patient has type 2 diabetes.  I am not sure why he is not on metformin.    Ironically, he had a recent hemoglobin A1c of 15.7%.       Anemia     This has developed over the past week or so.  Anemia studies will be obtained.  Stool Hemoccult will be requested.    Subjective:  The patient feels somewhat better.  He is " not eating very much.  He denies any chest pain, shortness of breath, abdominal pain, nausea, or vomiting.    Physical Exam:   Temp:  [97.5 °F (36.4 °C)-98.3 °F (36.8 °C)] 98 °F (36.7 °C)  HR:  [] 93  Resp:  [14-22] 14  BP: (132-194)/(62-86) 168/86    Gen: Well-developed, well-nourished, in no acute distress.  Neck: Supple.  No lymphadenopathy, goiter, or bruit.  Heart: Regular rhythm.  I heard no murmur, gallop, or rub.  Lungs: Clear to auscultation percussion.  No wheezing, rales, or rhonchi.  Abd: Soft with active bowel sounds.  [place]'s, tenderness, organomegaly.  Extremities: No clubbing, cyanosis, or edema.  No calf tenderness.  Neuro: Alert and oriented.  No focal sign.  Skin: Warm and dry.      LABS:   CBC:   Lab Results   Component Value Date    WBC 15.19 (H) 02/02/2024    HGB 8.8 (L) 02/02/2024    HCT 25.1 (L) 02/02/2024    MCV 91 02/02/2024     02/02/2024    RBC 2.76 (L) 02/02/2024    MCH 31.9 02/02/2024    MCHC 35.1 02/02/2024    RDW 11.6 02/02/2024    MPV 10.3 02/02/2024    NRBC 0 02/02/2024   , CMP:   Lab Results   Component Value Date    SODIUM 139 02/02/2024    K 3.2 (L) 02/02/2024     (H) 02/02/2024    CO2 23 02/02/2024    BUN 9 02/02/2024    CREATININE 1.12 02/02/2024    CALCIUM 7.8 (L) 02/02/2024    AST 18 02/01/2024    ALT 22 02/01/2024    ALKPHOS 51 02/01/2024    EGFR 74 02/02/2024             VTE Pharmacologic Prophylaxis: Reason for no pharmacologic prophylaxis acute anemia  VTE Mechanical Prophylaxis: sequential compression device

## 2024-02-02 NOTE — ASSESSMENT & PLAN NOTE
"Pt reporting concern for \"full body swelling\" especially in his face, arms, and legs since yesterday. Pt denies any lip, tongue, throat swelling or hives. He denies any chest pain, shortness of breath, or orthopnea. On exam, no swelling noted.   BNP obtained by the ED elevated at 473  CXR without any acute cardiopulm abnormalities  EKG without acute ischemic changes  Trops 9->8  Daily weights, I/O  Continue to monitor for evidence of volume overload  "

## 2024-02-02 NOTE — NURSING NOTE
Approx. 1535 nurse went to check on pt, was found in the bathroom unsteady and sweaty. BS was checked and was 35, MD made aware. /86 pul 93 pulse ox. 98% r/a

## 2024-02-02 NOTE — PLAN OF CARE
Problem: Nutrition/Hydration-ADULT  Goal: Nutrient/Hydration intake appropriate for improving, restoring or maintaining nutritional needs  Description: Monitor and assess patient's nutrition/hydration status for malnutrition. Collaborate with interdisciplinary team and initiate plan and interventions as ordered.  Monitor patient's weight and dietary intake as ordered or per policy. Utilize nutrition screening tool and intervene as necessary. Determine patient's food preferences and provide high-protein, high-caloric foods as appropriate.     INTERVENTIONS:  - Monitor oral intake, urinary output, labs, and treatment plans  - Assess nutrition and hydration status and recommend course of action  - Evaluate amount of meals eaten  - Assist patient with eating if necessary   - Allow adequate time for meals  - Recommend/ encourage appropriate diets, oral nutritional supplements, and vitamin/mineral supplements  - Order, calculate, and assess calorie counts as needed  - Recommend, monitor, and adjust tube feedings and TPN/PPN based on assessed needs  - Assess need for intravenous fluids  - Provide specific nutrition/hydration education as appropriate  - Include patient/family/caregiver in decisions related to nutrition  Outcome: Progressing     Problem: PAIN - ADULT  Goal: Verbalizes/displays adequate comfort level or baseline comfort level  Description: Interventions:  - Encourage patient to monitor pain and request assistance  - Assess pain using appropriate pain scale  - Administer analgesics based on type and severity of pain and evaluate response  - Implement non-pharmacological measures as appropriate and evaluate response  - Consider cultural and social influences on pain and pain management  - Notify physician/advanced practitioner if interventions unsuccessful or patient reports new pain  Outcome: Progressing     Problem: INFECTION - ADULT  Goal: Absence or prevention of progression during  hospitalization  Description: INTERVENTIONS:  - Assess and monitor for signs and symptoms of infection  - Monitor lab/diagnostic results  - Monitor all insertion sites, i.e. indwelling lines, tubes, and drains  - Monitor endotracheal if appropriate and nasal secretions for changes in amount and color  - [place] appropriate cooling/warming therapies per order  - Administer medications as ordered  - Instruct and encourage patient and family to use good hand hygiene technique  - Identify and instruct in appropriate isolation precautions for identified infection/condition  Outcome: Progressing     Problem: SAFETY ADULT  Goal: Patient will remain free of falls  Description: INTERVENTIONS:  - Educate patient/family on patient safety including physical limitations  - Instruct patient to call for assistance with activity   - Consult OT/PT to assist with strengthening/mobility   - Keep Call bell within reach  - Keep bed low and locked with side rails adjusted as appropriate  - Keep care items and personal belongings within reach  - Initiate and maintain comfort rounds  - Make Fall Risk Sign visible to staff  - Offer Toileting every 2 Hours, in advance of need  - Initiate/Maintain bed alarm  - Obtain necessary fall risk management equipment: bed alarm  - Apply yellow socks and bracelet for high fall risk patients  - Consider moving patient to room near nurses station  Outcome: Progressing     Problem: DISCHARGE PLANNING  Goal: Discharge to home or other facility with appropriate resources  Description: INTERVENTIONS:  - Identify barriers to discharge w/patient and caregiver  - Arrange for needed discharge resources and transportation as appropriate  - Identify discharge learning needs (meds, wound care, etc.)  - Arrange for interpretive services to assist at discharge as needed  - Refer to Case Management Department for coordinating discharge planning if the patient needs post-hospital services based on physician/advanced  practitioner order or complex needs related to functional status, cognitive ability, or social support system  Outcome: Progressing     Problem: Knowledge Deficit  Goal: Patient/family/caregiver demonstrates understanding of disease process, treatment plan, medications, and discharge instructions  Description: Complete learning assessment and assess knowledge base.  Interventions:  - Provide teaching at level of understanding  - Provide teaching via preferred learning methods  Outcome: Progressing     Problem: GASTROINTESTINAL - ADULT  Goal: Minimal or absence of nausea and/or vomiting  Description: INTERVENTIONS:  - Administer IV fluids if ordered to ensure adequate hydration  - Maintain NPO status until nausea and vomiting are resolved  - Nasogastric tube if ordered  - Administer ordered antiemetic medications as needed  - Provide nonpharmacologic comfort measures as appropriate  - Advance diet as tolerated, if ordered  - Consider nutrition services referral to assist patient with adequate nutrition and appropriate food choices  Outcome: Progressing     Problem: METABOLIC, FLUID AND ELECTROLYTES - ADULT  Goal: Electrolytes maintained within normal limits  Description: INTERVENTIONS:  - Monitor labs and assess patient for signs and symptoms of electrolyte imbalances  - Administer electrolyte replacement as ordered  - Monitor response to electrolyte replacements, including repeat lab results as appropriate  - Instruct patient on fluid and nutrition as appropriate  Outcome: Progressing

## 2024-02-02 NOTE — PLAN OF CARE
Problem: Nutrition/Hydration-ADULT  Goal: Nutrient/Hydration intake appropriate for improving, restoring or maintaining nutritional needs  Description: Monitor and assess patient's nutrition/hydration status for malnutrition. Collaborate with interdisciplinary team and initiate plan and interventions as ordered.  Monitor patient's weight and dietary intake as ordered or per policy. Utilize nutrition screening tool and intervene as necessary. Determine patient's food preferences and provide high-protein, high-caloric foods as appropriate.     INTERVENTIONS:  - Monitor oral intake, urinary output, labs, and treatment plans  - Assess nutrition and hydration status and recommend course of action  - Evaluate amount of meals eaten  - Assist patient with eating if necessary   - Allow adequate time for meals  - Recommend/ encourage appropriate diets, oral nutritional supplements, and vitamin/mineral supplements  - Order, calculate, and assess calorie counts as needed  - Recommend, monitor, and adjust tube feedings and TPN/PPN based on assessed needs  - Assess need for intravenous fluids  - Provide specific nutrition/hydration education as appropriate  - Include patient/family/caregiver in decisions related to nutrition  Outcome: Progressing     Problem: PAIN - ADULT  Goal: Verbalizes/displays adequate comfort level or baseline comfort level  Description: Interventions:  - Encourage patient to monitor pain and request assistance  - Assess pain using appropriate pain scale  - Administer analgesics based on type and severity of pain and evaluate response  - Implement non-pharmacological measures as appropriate and evaluate response  - Consider cultural and social influences on pain and pain management  - Notify physician/advanced practitioner if interventions unsuccessful or patient reports new pain  Outcome: Progressing     Problem: INFECTION - ADULT  Goal: Absence or prevention of progression during  hospitalization  Description: INTERVENTIONS:  - Assess and monitor for signs and symptoms of infection  - Monitor lab/diagnostic results  - Monitor all insertion sites, i.e. indwelling lines, tubes, and drains  - Monitor endotracheal if appropriate and nasal secretions for changes in amount and color  - [place] appropriate cooling/warming therapies per order  - Administer medications as ordered  - Instruct and encourage patient and family to use good hand hygiene technique  - Identify and instruct in appropriate isolation precautions for identified infection/condition  Outcome: Progressing     Problem: METABOLIC, FLUID AND ELECTROLYTES - ADULT  Goal: Electrolytes maintained within normal limits  Description: INTERVENTIONS:  - Monitor labs and assess patient for signs and symptoms of electrolyte imbalances  - Administer electrolyte replacement as ordered  - Monitor response to electrolyte replacements, including repeat lab results as appropriate  - Instruct patient on fluid and nutrition as appropriate  Outcome: Progressing

## 2024-02-02 NOTE — QUICK NOTE
Patient's shoes and socks removed.    Right Foot/Ankle   Right Foot Inspection  Skin Exam: skin normal and skin intact. No dry skin, no warmth, no callus, no erythema, no maceration, no abnormal color, no pre-ulcer, no ulcer and no callus.     Toe Exam: ROM and strength within normal limits. No swelling, no tenderness, erythema and  no right toe deformity    Sensory   Vibration: diminished  Monofilament testing: diminished    Vascular  Capillary refills: < 3 seconds  The right DP pulse is 2+. The right PT pulse is 2+.     Left Foot/Ankle  Left Foot Inspection  Skin Exam: skin normal and skin intact. No dry skin, no warmth, no erythema, no maceration, normal color, no pre-ulcer, no ulcer and no callus.     Toe Exam: ROM and strength within normal limits. No swelling, no tenderness, no erythema and no left toe deformity.     Sensory   Vibration: diminished  Monofilament testing: diminished    Vascular  Capillary refills: < 3 seconds  The left DP pulse is 2+. The left PT pulse is 2+.     Assign Risk Category  No deformity present  No loss of protective sensation  No weak pulses  Risk: 0

## 2024-02-02 NOTE — ASSESSMENT & PLAN NOTE
Pt fulfilling SIRS with mild tachycardia and leukocytosis of 15.16  Pt denies any infectious symptoms such as fever, chills, congestion, myalgias, or sore throat  Undoubtedly, this is related to the physiologic stress of hypoglycemia.

## 2024-02-02 NOTE — UTILIZATION REVIEW
Problem: Adult Inpatient Plan of Care  Goal: Plan of Care Review  Outcome: Ongoing, Progressing  Goal: Patient-Specific Goal (Individualized)  Outcome: Ongoing, Progressing  Goal: Absence of Hospital-Acquired Illness or Injury  Outcome: Ongoing, Progressing  Intervention: Identify and Manage Fall Risk  Recent Flowsheet Documentation  Taken 11/4/2021 1800 by Ro Bearden RN  Safety Promotion/Fall Prevention:   activity supervised   assistive device/personal items within reach   clutter free environment maintained   fall prevention program maintained   nonskid shoes/slippers when out of bed   safety round/check completed  Taken 11/4/2021 1600 by Ro Bearden RN  Safety Promotion/Fall Prevention:   activity supervised   assistive device/personal items within reach   clutter free environment maintained   fall prevention program maintained   nonskid shoes/slippers when out of bed   safety round/check completed  Taken 11/4/2021 1400 by Ro Bearden RN  Safety Promotion/Fall Prevention:   activity supervised   assistive device/personal items within reach   clutter free environment maintained   fall prevention program maintained   nonskid shoes/slippers when out of bed   safety round/check completed  Taken 11/4/2021 1200 by Ro Bearden RN  Safety Promotion/Fall Prevention:   activity supervised   assistive device/personal items within reach   clutter free environment maintained   fall prevention program maintained   nonskid shoes/slippers when out of bed   safety round/check completed  Taken 11/4/2021 1000 by Ro Bearden RN  Safety Promotion/Fall Prevention:   activity supervised   assistive device/personal items within reach   clutter free environment maintained   fall prevention program maintained   nonskid shoes/slippers when out of bed   safety round/check completed  Taken 11/4/2021 0800 by Ro Bearden RN  Safety Promotion/Fall Prevention:   activity supervised   assistive device/personal items within  Initial Clinical Review    Admission: Date/Time/Statement:   Admission Orders (From admission, onward)       Ordered        02/01/24 2230  Place in Observation  Once                          Orders Placed This Encounter   Procedures    Place in Observation     Standing Status:   Standing     Number of Occurrences:   1     Order Specific Question:   Level of Care     Answer:   Med Surg [16]     ED Arrival Information       Expected   -    Arrival   2/1/2024 18:32    Acuity   Urgent              Means of arrival   Walk-In    Escorted by   Family Member    Service   Hospitalist    Admission type   Emergency              Arrival complaint   Arm Finger and Facial swelling             Chief Complaint   Patient presents with    Leg Swelling     Pt reports swelling all over body since yesterday c/o generalized weakness       Initial Presentation: 53 y.o. male presents to the ED from home with c/o hypoglycemia, whole body swelling, face, arms, legs, fatigue, dizziness, recent admit for glucose mgmt.  Notes he took Lantus insulin today and drinks a lot of beer, last one 3 days PTA.  PMH: DM, AUD.  In he ED labs - glucose 25, leukocytosis, elevated BNP, low K 2.7, calcium, Mag, abnormal UA.  Imaging - no acute disease.  Treated with IV Thiamine, folic acid, IV D50, PO and IV KCL.  On admission exam no deficits.  He is admitted to OBSERVATION status with Hyperglycemia IDDM, Hypokalemia, elevated BNP, SIRs, ETOH use - D 50 improved pt, restart home insulin regimen, tele, trend K after repletion, daily wt,     Date:  2/2  Leukocytosis unchanged, adding lab studies.  K up to 3.2, HGB down to 8.8, glucose on lower side.      ED Triage Vitals [02/01/24 1841]   Temperature Pulse Respirations Blood Pressure SpO2   98.3 °F (36.8 °C) 89 17 132/62 100 %      Temp Source Heart Rate Source Patient Position - Orthostatic VS BP Location FiO2 (%)   Oral Monitor Lying Right arm --      Pain Score       No Pain          Wt Readings from Last 1  Encounters:   02/02/24 64.1 kg (141 lb 5 oz)     Additional Vital Signs:   Date/Time Temp Pulse Resp BP MAP (mmHg) SpO2 O2 Device Patient Position - Orthostatic VS   02/02/24 08:10:28 98 °F (36.7 °C) 91 -- 165/84 111 98 % -- --   02/02/24 0416 -- 112 Abnormal  -- -- -- -- -- --   02/02/24 01:06:27 -- 90 -- 161/79 106 100 % -- --   02/01/24 23:12:59 97.5 °F (36.4 °C) 84 14 177/83 Abnormal  114 100 % None (Room air) Lying   02/01/24 2200 -- 80 14 152/73 105 100 % None (Room air) Lying   02/01/24 2130 97.5 °F (36.4 °C) 94 22 194/81 Abnormal  117 100 % None (Room air) --       Pertinent Labs/Diagnostic Test Results:   2/1 ECG - Normal sinus rhythm  Cannot rule out Inferior infarct , age undetermined  ST & T wave abnormality, consider anterior ischemia  Prolonged QT  Abnormal ECG    2/1 ECG -   Normal sinus rhythm  Nonspecific T wave abnormality  Abnormal ECG       XR chest 1 view portable   ED Interpretation by Dmitri Mckeon PA-C (02/01 2117)   ED wet read:  No acute cardiopulmonary disease appreciated.        Final Result by Vasiliy Camacho MD (02/02 0821)      No acute cardiopulmonary disease.            Workstation performed: [id number]               Results from last 7 days   Lab Units 02/02/24 0429 02/01/24 2032   WBC Thousand/uL 15.19* 15.16*   HEMOGLOBIN g/dL 8.8* 9.5*   HEMATOCRIT % 25.1* 26.5*   PLATELETS Thousands/uL 253 325   NEUTROS ABS Thousands/µL 8.80* 8.63*         Results from last 7 days   Lab Units 02/02/24 0429 02/01/24 2032 01/27/24 0447 01/26/24  1714   SODIUM mmol/L 139 140 134* 131*   POTASSIUM mmol/L 3.2* 2.7* 3.9 3.9   CHLORIDE mmol/L 112* 111* 105 102   CO2 mmol/L 23 23 24 23   ANION GAP mmol/L 4 6 5 6   BUN mg/dL 9 9 17 12   CREATININE mg/dL 1.12 1.03 1.19 1.09   EGFR ml/min/1.73sq m 74 82 69 77   CALCIUM mg/dL 7.8* 8.0* 8.1* 7.9*   MAGNESIUM mg/dL 2.3 1.8*  --   --      Results from last 7 days   Lab Units 02/01/24  2032   AST U/L 18   ALT U/L 22   ALK PHOS U/L 51   TOTAL  reach   clutter free environment maintained   fall prevention program maintained   nonskid shoes/slippers when out of bed   safety round/check completed  Intervention: Prevent Skin Injury  Recent Flowsheet Documentation  Taken 11/4/2021 1800 by Ro Bearden RN  Body Position: position changed independently  Skin Protection:   adhesive use limited   incontinence pads utilized   tubing/devices free from skin contact  Taken 11/4/2021 1600 by Ro Bearden RN  Body Position: position changed independently  Skin Protection:   adhesive use limited   incontinence pads utilized   tubing/devices free from skin contact  Taken 11/4/2021 1400 by Ro Bearden RN  Body Position: position changed independently  Skin Protection:   adhesive use limited   incontinence pads utilized   tubing/devices free from skin contact  Taken 11/4/2021 1200 by Ro Bearden RN  Body Position: position changed independently  Skin Protection:   adhesive use limited   incontinence pads utilized   tubing/devices free from skin contact  Taken 11/4/2021 1000 by Ro Bearden RN  Body Position: position changed independently  Skin Protection:   adhesive use limited   incontinence pads utilized   tubing/devices free from skin contact  Taken 11/4/2021 0800 by Ro Bearden RN  Body Position: position changed independently  Skin Protection:   adhesive use limited   incontinence pads utilized   tubing/devices free from skin contact  Intervention: Prevent and Manage VTE (venous thromboembolism) Risk  Recent Flowsheet Documentation  Taken 11/4/2021 0800 by Ro Baerden RN  VTE Prevention/Management:   bilateral   dorsiflexion/plantar flexion performed   bleeding risk factor(s) identified  Intervention: Prevent Infection  Recent Flowsheet Documentation  Taken 11/4/2021 1800 by Ro Bearden RN  Infection Prevention: environmental surveillance performed  Taken 11/4/2021 1600 by Ro Bearden RN  Infection Prevention: environmental surveillance  PROTEIN g/dL 5.4*   ALBUMIN g/dL 3.0*   TOTAL BILIRUBIN mg/dL 0.58   AMMONIA umol/L 24     Results from last 7 days   Lab Units 02/02/24  1208 02/02/24  1001 02/02/24  0809 02/02/24  0606 02/02/24  0450 02/02/24  0431 02/02/24  0412 02/02/24  0205 02/01/24  2315 02/01/24 2257 02/01/24 2202 02/01/24  2046   POC GLUCOSE mg/dl 98 65 122 73 75 42* 22* 123 184* 163* 152* 85     Results from last 7 days   Lab Units 02/02/24  0429 02/01/24 2032 01/27/24 0447 01/26/24  1714   GLUCOSE RANDOM mg/dL 41* 25* 241* 347*             BETA-HYDROXYBUTYRATE   Date Value Ref Range Status   01/25/2024 0.2 <0.6 mmol/L Final   10/17/2023 0.1 <0.6 mmol/L Final   02/02/2019 0.16 0.02 - 0.27 mmol/L Final      Results from last 7 days   Lab Units 02/01/24 2242 02/01/24 2032   HS TNI 0HR ng/L  --  9   HS TNI 2HR ng/L 8  --    HSTNI D2 ng/L -1  --              Results from last 7 days   Lab Units 02/01/24 2032   TSH 3RD GENERATON uIU/mL 0.910     Results from last 7 days   Lab Units 02/01/24 2032   BNP pg/mL 473*     Results from last 7 days   Lab Units 02/01/24 2250   CLARITY UA  Clear   COLOR UA  Light Yellow   SPEC GRAV UA  1.008   PH UA  5.5   GLUCOSE UA mg/dl 70 (7/100%)*   KETONES UA mg/dl Negative   BLOOD UA  Negative   PROTEIN UA mg/dl 50 (1+)*   NITRITE UA  Negative   BILIRUBIN UA  Negative   UROBILINOGEN UA (BE) mg/dl <2.0   LEUKOCYTES UA  Negative   WBC UA /hpf 1-2   RBC UA /hpf 1-2   BACTERIA UA /hpf None Seen   EPITHELIAL CELLS WET PREP /hpf None Seen   MUCUS THREADS  Occasional*     ED Treatment:   Medication Administration from 02/01/2024 1832 to 02/01/2024 2307         Date/Time Order Dose Route Action     02/01/2024 2147 EST thiamine (VITAMIN B1) 100 mg in sodium chloride 0.9 % 50 mL IVPB 100 mg Intravenous New Bag     02/01/2024 2130 EST folic acid 1 mg in sodium chloride 0.9 % 50 mL IVPB 1 mg Intravenous New Bag     02/01/2024 2030 EST dextrose 50 % IV solution 50 mL 50 mL Intravenous Given     02/01/2024 2154 EST  performed  Taken 11/4/2021 1400 by Ro Bearden RN  Infection Prevention: environmental surveillance performed  Taken 11/4/2021 1200 by Ro Bearden RN  Infection Prevention: environmental surveillance performed  Taken 11/4/2021 1000 by Ro Bearden RN  Infection Prevention: environmental surveillance performed  Taken 11/4/2021 0800 by Ro Bearden RN  Infection Prevention: environmental surveillance performed  Goal: Optimal Comfort and Wellbeing  Outcome: Ongoing, Progressing  Intervention: Provide Person-Centered Care  Recent Flowsheet Documentation  Taken 11/4/2021 0800 by Ro Bearden RN  Trust Relationship/Rapport:   care explained   choices provided   thoughts/feelings acknowledged  Goal: Readiness for Transition of Care  Outcome: Ongoing, Progressing     Problem: Skin Injury Risk Increased  Goal: Skin Health and Integrity  Outcome: Ongoing, Progressing  Intervention: Optimize Skin Protection  Recent Flowsheet Documentation  Taken 11/4/2021 1800 by Ro Bearden RN  Pressure Reduction Techniques:   frequent weight shift encouraged   weight shift assistance provided  Head of Bed (HOB): [facility] elevated  Pressure Reduction Devices:   pressure-redistributing mattress utilized   positioning supports utilized  Skin Protection:   adhesive use limited   incontinence pads utilized   tubing/devices free from skin contact  Taken 11/4/2021 1600 by Ro Bearden RN  Pressure Reduction Techniques:   frequent weight shift encouraged   weight shift assistance provided  Head of Bed ([facility]): [facility] elevated  Pressure Reduction Devices:   pressure-redistributing mattress utilized   positioning supports utilized  Skin Protection:   adhesive use limited   incontinence pads utilized   tubing/devices free from skin contact  Taken 11/4/2021 1400 by Ro Bearden RN  Pressure Reduction Techniques:   frequent weight shift encouraged   weight shift assistance provided  Head of Bed ([facility]): [facility] elevated  Pressure Reduction Devices:    potassium chloride (Klor-Con M20) CR tablet 40 mEq 40 mEq Oral Given     02/01/2024 2204 EST potassium chloride 20 mEq IVPB (premix) 20 mEq Intravenous New Bag          Past Medical History:   Diagnosis Date    Alcohol abuse     Chronic pancreatitis (HCC)     Diabetes mellitus (HCC)     Type 2    Pancreatitis     Paroxysmal A-fib (HCC)     Thrombocytopenia (HCC)      Present on Admission:   Uncomplicated alcohol dependence (HCC)   Hypokalemia   Elevated brain natriuretic peptide (BNP) level      Admitting Diagnosis: Hypocalcemia [E83.51]  Hypokalemia [E87.6]  Hypoalbuminemia [E88.09]  Anemia [D64.9]  Hypoglycemia [E16.2]  Leg swelling [M79.89]  Elevated brain natriuretic peptide (BNP) level [R79.89]  Bilateral lower extremity edema [R60.0]  Age/Sex: 53 y.o. male  Admission Orders:  Scheduled Medications:  folic acid, 1 mg, Oral, Daily  lisinopril, 10 mg, Oral, Daily  multivitamin-minerals, 1 tablet, Oral, Daily  thiamine, 100 mg, Oral, Daily      Continuous IV Infusions:     PRN Meds:  acetaminophen, 650 mg, Oral, Q6H PRN    HIV  Albumin/Creat urine ration  Daily wt  Ambulate  POC GLUCOSE AC/HS WITH SSI COVERAGE   CIWA, cont pulse ox  Tele        Network Utilization Review Department  ATTENTION: Please call with any questions or concerns to [phone] and carefully listen to the prompts so that you are directed to the right person. All voicemails are confidential.   For Discharge needs, contact Care Management DC Support Team at [phone] opt. 2  Send all requests for admission clinical reviews, approved or denied determinations and any other requests to dedicated fax number below belonging to the campus where the patient is receiving treatment. List of dedicated fax numbers for the Facilities:  FACILITY NAME UR FAX NUMBER   ADMISSION DENIALS (Administrative/Medical Necessity) [phone]   DISCHARGE SUPPORT TEAM (NETWORK) [phone]   PARENT CHILD HEALTH (Maternity/NICU/Pediatrics) [phone]   [facility]  pressure-redistributing mattress utilized   positioning supports utilized  Skin Protection:   adhesive use limited   incontinence pads utilized   tubing/devices free from skin contact  Taken 11/4/2021 1200 by Ro Bearden RN  Pressure Reduction Techniques:   frequent weight shift encouraged   weight shift assistance provided  Head of Bed ([facility]): [facility] elevated  Pressure Reduction Devices:   pressure-redistributing mattress utilized   positioning supports utilized  Skin Protection:   adhesive use limited   incontinence pads utilized   tubing/devices free from skin contact  Taken 11/4/2021 1000 by Ro Bearden RN  Pressure Reduction Techniques:   frequent weight shift encouraged   weight shift assistance provided  Head of Bed ([facility]): [facility] elevated  Pressure Reduction Devices:   pressure-redistributing mattress utilized   positioning supports utilized  Skin Protection:   adhesive use limited   incontinence pads utilized   tubing/devices free from skin contact  Taken 11/4/2021 0800 by Ro Bearden RN  Pressure Reduction Techniques:   frequent weight shift encouraged   weight shift assistance provided  Head of Bed ([facility]): [facility] elevated  Pressure Reduction Devices:   pressure-redistributing mattress utilized   positioning supports utilized  Skin Protection:   adhesive use limited   incontinence pads utilized   tubing/devices free from skin contact   Goal Outcome Evaluation:                  General acute hospital [phone]   [facility] [phone]   Critical access hospital [phone]   [facility] [phone]   [facility] [phone]   [facility] [phone]   [facility] [phone]   [facility] [phone]   [facility] [phone]   [facility] [phone]   [facility] [phone]   [facility] [phone]

## 2024-02-02 NOTE — ASSESSMENT & PLAN NOTE
Lab Results   Component Value Date    HGBA1C 15.7 (H) 01/26/2024       Recent Labs     02/02/24  1001 02/02/24  1208 02/02/24  1408 02/02/24  1537   POCGLU 65 98 86 35*     The patient was admitted with hypoglycemia.  His blood sugar was 20.  Apparently, the patient took 25 of Lantus although he was discharged from the hospital on last on 10 units.  Sugars have been on the low side through the day.  He just had a sugar of 35.    In light of this, I am reluctant to discharge him.  We will continue to monitor his glucose without pharmacologic intervention.  The patient has type 2 diabetes.  I am not sure why he is not on metformin.    Ironically, he had a recent hemoglobin A1c of 15.7%.

## 2024-02-03 VITALS
HEART RATE: 73 BPM | OXYGEN SATURATION: 97 % | SYSTOLIC BLOOD PRESSURE: 144 MMHG | WEIGHT: 139.33 LBS | BODY MASS INDEX: 20.58 KG/M2 | RESPIRATION RATE: 20 BRPM | DIASTOLIC BLOOD PRESSURE: 74 MMHG | TEMPERATURE: 98.6 F

## 2024-02-03 LAB
ANION GAP SERPL CALCULATED.3IONS-SCNC: 3 MMOL/L
BASOPHILS # BLD AUTO: 0.04 THOUSANDS/ÂΜL (ref 0–0.1)
BASOPHILS NFR BLD AUTO: 0 % (ref 0–1)
BUN SERPL-MCNC: 12 MG/DL (ref 5–25)
CALCIUM SERPL-MCNC: 7.4 MG/DL (ref 8.4–10.2)
CHLORIDE SERPL-SCNC: 108 MMOL/L (ref 96–108)
CO2 SERPL-SCNC: 26 MMOL/L (ref 21–32)
CREAT SERPL-MCNC: 1.32 MG/DL (ref 0.6–1.3)
EOSINOPHIL # BLD AUTO: 0.34 THOUSAND/ÂΜL (ref 0–0.61)
EOSINOPHIL NFR BLD AUTO: 3 % (ref 0–6)
ERYTHROCYTE [DISTWIDTH] IN BLOOD BY AUTOMATED COUNT: 11.9 % (ref 11.6–15.1)
FOLATE SERPL-MCNC: >22.3 NG/ML
GFR SERPL CREATININE-BSD FRML MDRD: 61 ML/MIN/1.73SQ M
GLUCOSE SERPL-MCNC: 191 MG/DL (ref 65–140)
GLUCOSE SERPL-MCNC: 213 MG/DL (ref 65–140)
GLUCOSE SERPL-MCNC: 269 MG/DL (ref 65–140)
GLUCOSE SERPL-MCNC: 278 MG/DL (ref 65–140)
GLUCOSE SERPL-MCNC: 47 MG/DL (ref 65–140)
GLUCOSE SERPL-MCNC: 85 MG/DL (ref 65–140)
HCT VFR BLD AUTO: 22.6 % (ref 36.5–49.3)
HGB BLD-MCNC: 7.7 G/DL (ref 12–17)
HIV 1+2 AB+HIV1 P24 AG SERPL QL IA: NORMAL
HIV 2 AB SERPL QL IA: NORMAL
HIV1 AB SERPL QL IA: NORMAL
HIV1 P24 AG SERPL QL IA: NORMAL
IMM GRANULOCYTES # BLD AUTO: 0.18 THOUSAND/UL (ref 0–0.2)
IMM GRANULOCYTES NFR BLD AUTO: 1 % (ref 0–2)
LYMPHOCYTES # BLD AUTO: 4.01 THOUSANDS/ÂΜL (ref 0.6–4.47)
LYMPHOCYTES NFR BLD AUTO: 30 % (ref 14–44)
MCH RBC QN AUTO: 31 PG (ref 26.8–34.3)
MCHC RBC AUTO-ENTMCNC: 34.1 G/DL (ref 31.4–37.4)
MCV RBC AUTO: 91 FL (ref 82–98)
MONOCYTES # BLD AUTO: 1.25 THOUSAND/ÂΜL (ref 0.17–1.22)
MONOCYTES NFR BLD AUTO: 9 % (ref 4–12)
NEUTROPHILS # BLD AUTO: 7.48 THOUSANDS/ÂΜL (ref 1.85–7.62)
NEUTS SEG NFR BLD AUTO: 57 % (ref 43–75)
NRBC BLD AUTO-RTO: 0 /100 WBCS
PLATELET # BLD AUTO: 246 THOUSANDS/UL (ref 149–390)
PMV BLD AUTO: 11.3 FL (ref 8.9–12.7)
POTASSIUM SERPL-SCNC: 4 MMOL/L (ref 3.5–5.3)
RBC # BLD AUTO: 2.48 MILLION/UL (ref 3.88–5.62)
SODIUM SERPL-SCNC: 137 MMOL/L (ref 135–147)
VIT B12 SERPL-MCNC: 680 PG/ML (ref 180–914)
WBC # BLD AUTO: 13.3 THOUSAND/UL (ref 4.31–10.16)

## 2024-02-03 PROCEDURE — 82948 REAGENT STRIP/BLOOD GLUCOSE: CPT

## 2024-02-03 PROCEDURE — 85025 COMPLETE CBC W/AUTO DIFF WBC: CPT | Performed by: INTERNAL MEDICINE

## 2024-02-03 PROCEDURE — 87389 HIV-1 AG W/HIV-1&-2 AB AG IA: CPT | Performed by: INTERNAL MEDICINE

## 2024-02-03 PROCEDURE — 99239 HOSP IP/OBS DSCHRG MGMT >30: CPT | Performed by: INTERNAL MEDICINE

## 2024-02-03 PROCEDURE — 80048 BASIC METABOLIC PNL TOTAL CA: CPT | Performed by: INTERNAL MEDICINE

## 2024-02-03 RX ORDER — INSULIN GLARGINE 100 [IU]/ML
10 INJECTION, SOLUTION SUBCUTANEOUS EVERY MORNING
Status: DISCONTINUED | OUTPATIENT
Start: 2024-02-03 | End: 2024-02-03 | Stop reason: HOSPADM

## 2024-02-03 RX ADMIN — INSULIN LISPRO 4 UNITS: 100 INJECTION, SOLUTION INTRAVENOUS; SUBCUTANEOUS at 11:18

## 2024-02-03 RX ADMIN — LISINOPRIL 10 MG: 10 TABLET ORAL at 08:18

## 2024-02-03 RX ADMIN — METFORMIN HYDROCHLORIDE 500 MG: 500 TABLET, FILM COATED ORAL at 15:43

## 2024-02-03 RX ADMIN — INSULIN LISPRO 2 UNITS: 100 INJECTION, SOLUTION INTRAVENOUS; SUBCUTANEOUS at 00:17

## 2024-02-03 RX ADMIN — INSULIN LISPRO 2 UNITS: 100 INJECTION, SOLUTION INTRAVENOUS; SUBCUTANEOUS at 08:20

## 2024-02-03 RX ADMIN — INSULIN GLARGINE 10 UNITS: 100 INJECTION, SOLUTION SUBCUTANEOUS at 14:13

## 2024-02-03 RX ADMIN — FOLIC ACID 1 MG: 1 TABLET ORAL at 08:19

## 2024-02-03 RX ADMIN — Medication 1 TABLET: at 08:19

## 2024-02-03 RX ADMIN — THIAMINE HCL TAB 100 MG 100 MG: 100 TAB at 08:19

## 2024-02-03 NOTE — UTILIZATION REVIEW
Initial Inpatient Review     Date: 2/3     Observation 2/1 2230 Changed to Inpatient 2/2 1742 due to hypoglycemia requiring continued monitoring of blood glucose      Admission Orders (From admission, onward)       Ordered        02/02/24 1742  Inpatient Admission  Once            02/01/24 2230  Place in Observation  Once                                                    Current Patient Class: Inpatient   Current Level of Care: Med surg     HPI:53 y.o. male initially admitted on 2/1 as observation from ED with Hypoglycemia, blood glucose 20, concern for weakness, dizziness, full body swelling , hypokalemia K 2.7, elevated BNP, SIRS, alcohol dependence  patient with T2 DM, on Lantus and Humalog, took 25 u lantus instead of prescribed 10 units.   Blood glucose on 2/3  @1537 again decreased to 35  oral glucose given .  pt diaphoretic and unsteady.  Changed to Inpatient 2/2 for continued  close glucose monitoring.      Assessment/Plan: 2/4  Lungs  clear.  Cardiac exam - regular rhythm.  The abdomen soft and nontender.   no edema  Glucose stabilized, Started metformin twice daily, Lantus 10 u daily.         Vital Signs:   Date/Time Temp Pulse Resp BP MAP (mmHg) SpO2 O2 Device Patient Position - Orthostatic VS   02/03/24 0300 -- 73 -- -- -- -- -- --   02/02/24 23:18:27 98.6 °F (37 °C) 86 20 144/74 97 97 % -- Lying   02/02/24 20:24:23 99.3 °F (37.4 °C) 81 14 147/73 98 100 % None (Room air) Lying       Pertinent Labs/Diagnostic Results:       Results from last 7 days   Lab Units 02/03/24 0444 02/02/24 0429 02/01/24 2032   WBC Thousand/uL 13.30* 15.19* 15.16*   HEMOGLOBIN g/dL 7.7* 8.8* 9.5*   HEMATOCRIT % 22.6* 25.1* 26.5*   PLATELETS Thousands/uL 246 253 325   NEUTROS ABS Thousands/µL 7.48 8.80* 8.63*         Results from last 7 days   Lab Units 02/03/24 0444 02/02/24 0429 02/01/24 2032   SODIUM mmol/L 137 139 140   POTASSIUM mmol/L 4.0 3.2* 2.7*   CHLORIDE mmol/L 108 112* 111*   CO2 mmol/L 26 23 23   ANION GAP  mmol/L 3 4 6   BUN mg/dL 12 9 9   CREATININE mg/dL 1.32* 1.12 1.03   EGFR ml/min/1.73sq m 61 74 82   CALCIUM mg/dL 7.4* 7.8* 8.0*   MAGNESIUM mg/dL  --  2.3 1.8*     Results from last 7 days   Lab Units 02/01/24 2032   AST U/L 18   ALT U/L 22   ALK PHOS U/L 51   TOTAL PROTEIN g/dL 5.4*   ALBUMIN g/dL 3.0*   TOTAL BILIRUBIN mg/dL 0.58   AMMONIA umol/L 24     Results from last 7 days   Lab Units 02/03/24  1158 02/03/24  1035 02/03/24  0804 02/03/24  0610 02/03/24  0004 02/02/24  2202 02/02/24  2004 02/02/24  1806 02/02/24  1625 02/02/24  1537 02/02/24  1408 02/02/24  1208   POC GLUCOSE mg/dl 191* 278* 213* 85 269* 284* 255* 267* 135 35* 86 98     Results from last 7 days   Lab Units 02/03/24  0444 02/02/24  0429 02/01/24 2032   GLUCOSE RANDOM mg/dL 47* 41* 25*             BETA-HYDROXYBUTYRATE   Date Value Ref Range Status   01/25/2024 0.2 <0.6 mmol/L Final   10/17/2023 0.1 <0.6 mmol/L Final   02/02/2019 0.16 0.02 - 0.27 mmol/L Final                      Results from last 7 days   Lab Units 02/01/24 2242 02/01/24 2032   HS TNI 0HR ng/L  --  9   HS TNI 2HR ng/L 8  --    HSTNI D2 ng/L -1  --              Results from last 7 days   Lab Units 02/01/24 2032   TSH 3RD GENERATON uIU/mL 0.910                     Results from last 7 days   Lab Units 02/01/24 2032   BNP pg/mL 473*     Results from last 7 days   Lab Units 02/02/24  1628   IRON SATURATION % 44   IRON ug/dL 83   TIBC ug/dL 189*                                 Results from last 7 days   Lab Units 02/02/24  1216 02/01/24  2250   CLARITY UA   --  Clear   COLOR UA   --  Light Yellow   SPEC GRAV UA   --  1.008   PH UA   --  5.5   GLUCOSE UA mg/dl  --  70 (7/100%)*   KETONES UA mg/dl  --  Negative   BLOOD UA   --  Negative   PROTEIN UA mg/dl  --  50 (1+)*   NITRITE UA   --  Negative   BILIRUBIN UA   --  Negative   UROBILINOGEN UA (BE) mg/dl  --  <2.0   LEUKOCYTES UA   --  Negative   WBC UA /hpf  --  1-2   RBC UA /hpf  --  1-2   BACTERIA UA /hpf  --  None Seen    EPITHELIAL CELLS WET PREP /hpf  --  None Seen   MUCUS THREADS   --  Occasional*   CREATININE UR mg/dL 39.6  --                                                    Medications:   Scheduled Medications:  folic acid, 1 mg, Oral, Daily  insulin glargine, 10 Units, Subcutaneous, QAM  insulin lispro, 1-5 Units, Subcutaneous, HS  insulin lispro, 1-6 Units, Subcutaneous, TID AC  lisinopril, 10 mg, Oral, Daily  metFORMIN, 500 mg, Oral, BID With Meals  multivitamin-minerals, 1 tablet, Oral, Daily  thiamine, 100 mg, Oral, Daily      Continuous IV Infusions:     PRN Meds:  acetaminophen, 650 mg, Oral, Q6H PRN        Discharge Plan: TBD     Network Utilization Review Department  ATTENTION: Please call with any questions or concerns to [phone] and carefully listen to the prompts so that you are directed to the right person. All voicemails are confidential.   For Discharge needs, contact Care Management DC Support Team at [phone] opt. 2  Send all requests for admission clinical reviews, approved or denied determinations and any other requests to dedicated fax number below belonging to the [place] where the patient is receiving treatment. List of dedicated fax numbers for the Facilities:  FACILITY NAME UR FAX NUMBER   ADMISSION DENIALS (Administrative/Medical Necessity) [phone]   DISCHARGE SUPPORT TEAM (NETWORK) [phone]   PARENT CHILD HEALTH (Maternity/NICU/Pediatrics) [phone]   [facility] [phone]   [facility] [phone]   [facility] [phone]   [facility] [phone]   [facility] [phone]   [facility] [phone]   [facility] [phone]   [facility] [phone]   [facility] [phone]   [facility]  [phone]   [facility] [phone]   [facility] [phone]

## 2024-02-03 NOTE — DISCHARGE SUMMARY
Discharge Summary - Wes Araujo, 1970, [id number]        Admission Date: 2/1/2024  Discharge Date:     Discharge Diagnosis:   1.  Hypoglycemia  2.  Uncontrolled type 2 diabetes  3.  Hypokalemia  4.  Systemic inflammatory response syndrome on admission, related to hypoglycemia  5.  Uncomplicated alcohol dependence    Consulting Physicians:  None    Procedures Performed:   None    HPI: The patient is a 53-year-old man with type 2 diabetes who has been on Lantus and Humalog.  For some reason he took 25 units of Lantus instead of the prescribed 10 units.  He became hypoglycemic.  He was given D50 with improvement but was admitted for further care.    Hospital Course: The patient was admitted to the hospital and monitored carefully.  He developed 1 other episode of hypoglycemia which responded to oral glucose.  This was presumably related to residual Lantus effect in the face of limited oral intake.  Thereafter, his glucose stabilized.  I asked the patient to take metformin 500 mg twice daily and Lantus 10 units daily at the time of discharge.  I stopped his short acting insulin because I do not believe that the patient is capable of managing this effectively.    The patient was hypokalemic at the time of admission.  His potassium was repleted.    The patient had criteria for systemic inflammatory response syndrome at the time of admission.  I believe that this was related to catecholamine excess from his hypoglycemia.  There was no evidence for infection.    The patient has a history of alcohol overuse.  He was treated with appropriate vitamin supplementation during his stay.  The CIWA protocol was put in place but the patient had no evidence of withdrawal.  He was counseled regarding the need for alcohol cessation.    At the time of discharge the patient was feeling well.  Vital signs were stable.  Lungs were clear.  Cardiac exam revealed a regular rhythm.  The abdomen was soft and nontender.  There was no  edema.    Disposition: The patient was discharged home on February 3.  He will follow a diabetic diet.  His activity will be as tolerated.  He was asked to follow-up with his primary care physician within 1 week.    Discharge instructions/Information to patient and family:   See after visit summary for information provided to patient and family.      Provisions for Follow-Up Care:  See after visit summary for information related to follow-up care and any pertinent home health orders.      Planned Readmission: No    Discharge Statement   I spent 35.  Minutes discharging the patient. This time was spent on the day of discharge. I had direct contact with the patient on the day of discharge.     Discharge Medications:  See after visit summary for reconciled discharge medications provided to patient and family.

## 2024-02-03 NOTE — NURSING NOTE
AVS reviewed with pt and his father. Pt instructed to no longer take humalog, to continue 10 of lantus(father reinforced this to son verbally) and pt told to begin metoformin and  script. Teachback used.

## 2024-02-03 NOTE — PLAN OF CARE
Problem: Nutrition/Hydration-ADULT  Goal: Nutrient/Hydration intake appropriate for improving, restoring or maintaining nutritional needs  Description: Monitor and assess patient's nutrition/hydration status for malnutrition. Collaborate with interdisciplinary team and initiate plan and interventions as ordered.  Monitor patient's weight and dietary intake as ordered or per policy. Utilize nutrition screening tool and intervene as necessary. Determine patient's food preferences and provide high-protein, high-caloric foods as appropriate.     INTERVENTIONS:  - Monitor oral intake, urinary output, labs, and treatment plans  - Assess nutrition and hydration status and recommend course of action  - Evaluate amount of meals eaten  - Assist patient with eating if necessary   - Allow adequate time for meals  - Recommend/ encourage appropriate diets, oral nutritional supplements, and vitamin/mineral supplements  - Order, calculate, and assess calorie counts as needed  - Recommend, monitor, and adjust tube feedings and TPN/PPN based on assessed needs  - Assess need for intravenous fluids  - Provide specific nutrition/hydration education as appropriate  - Include patient/family/caregiver in decisions related to nutrition  Outcome: Progressing     Problem: PAIN - ADULT  Goal: Verbalizes/displays adequate comfort level or baseline comfort level  Description: Interventions:  - Encourage patient to monitor pain and request assistance  - Assess pain using appropriate pain scale  - Administer analgesics based on type and severity of pain and evaluate response  - Implement non-pharmacological measures as appropriate and evaluate response  - Consider cultural and social influences on pain and pain management  - Notify physician/advanced practitioner if interventions unsuccessful or patient reports new pain  Outcome: Progressing     Problem: INFECTION - ADULT  Goal: Absence or prevention of progression during  hospitalization  Description: INTERVENTIONS:  - Assess and monitor for signs and symptoms of infection  - Monitor lab/diagnostic results  - Monitor all insertion sites, i.e. indwelling lines, tubes, and drains  - Monitor endotracheal if appropriate and nasal secretions for changes in amount and color  - [place] appropriate cooling/warming therapies per order  - Administer medications as ordered  - Instruct and encourage patient and family to use good hand hygiene technique  - Identify and instruct in appropriate isolation precautions for identified infection/condition  Outcome: Progressing     Problem: SAFETY ADULT  Goal: Patient will remain free of falls  Description: INTERVENTIONS:  - Educate patient/family on patient safety including physical limitations  - Instruct patient to call for assistance with activity   - Consult OT/PT to assist with strengthening/mobility   - Keep Call bell within reach  - Keep bed low and locked with side rails adjusted as appropriate  - Keep care items and personal belongings within reach  - Initiate and maintain comfort rounds  - Make Fall Risk Sign visible to staff  - Offer Toileting every 2 Hours, in advance of need  - Initiate/Maintain bed alarm  - Obtain necessary fall risk management equipment: bed alarm  - Apply yellow socks and bracelet for high fall risk patients  - Consider moving patient to room near nurses station  Outcome: Progressing     Problem: DISCHARGE PLANNING  Goal: Discharge to home or other facility with appropriate resources  Description: INTERVENTIONS:  - Identify barriers to discharge w/patient and caregiver  - Arrange for needed discharge resources and transportation as appropriate  - Identify discharge learning needs (meds, wound care, etc.)  - Arrange for interpretive services to assist at discharge as needed  - Refer to Case Management Department for coordinating discharge planning if the patient needs post-hospital services based on physician/advanced  practitioner order or complex needs related to functional status, cognitive ability, or social support system  Outcome: Progressing     Problem: Knowledge Deficit  Goal: Patient/family/caregiver demonstrates understanding of disease process, treatment plan, medications, and discharge instructions  Description: Complete learning assessment and assess knowledge base.  Interventions:  - Provide teaching at level of understanding  - Provide teaching via preferred learning methods  Outcome: Progressing     Problem: GASTROINTESTINAL - ADULT  Goal: Minimal or absence of nausea and/or vomiting  Description: INTERVENTIONS:  - Administer IV fluids if ordered to ensure adequate hydration  - Maintain NPO status until nausea and vomiting are resolved  - Nasogastric tube if ordered  - Administer ordered antiemetic medications as needed  - Provide nonpharmacologic comfort measures as appropriate  - Advance diet as tolerated, if ordered  - Consider nutrition services referral to assist patient with adequate nutrition and appropriate food choices  Outcome: Progressing     Problem: METABOLIC, FLUID AND ELECTROLYTES - ADULT  Goal: Electrolytes maintained within normal limits  Description: INTERVENTIONS:  - Monitor labs and assess patient for signs and symptoms of electrolyte imbalances  - Administer electrolyte replacement as ordered  - Monitor response to electrolyte replacements, including repeat lab results as appropriate  - Instruct patient on fluid and nutrition as appropriate  Outcome: Progressing

## 2024-02-07 NOTE — UTILIZATION REVIEW
NOTIFICATION OF ADMISSION DISCHARGE   This is a Notification of Discharge from [facility]. Please be advised that this patient has been discharge from our facility. Below you will find the admission and discharge date and time including the patient’s disposition.   UTILIZATION REVIEW CONTACT:  Katie Almeida MA  Utilization   Network Utilization Review Department  Phone: [phone] x carefully listen to the prompts. All voicemails are confidential.  Email: [email or web address]     ADMISSION INFORMATION  PRESENTATION DATE: 2/1/2024  6:51 PM  OBERVATION ADMISSION DATE:   INPATIENT ADMISSION DATE: 2/2/24  5:42 PM   DISCHARGE DATE: 2/3/2024  5:03 PM   DISPOSITION:Home/Self Care    Network Utilization Review Department  ATTENTION: Please call with any questions or concerns to [phone] and carefully listen to the prompts so that you are directed to the right person. All voicemails are confidential.   For Discharge needs, contact Care Management DC Support Team at [phone] opt. 2  Send all requests for admission clinical reviews, approved or denied determinations and any other requests to dedicated fax number below belonging to the campus where the patient is receiving treatment. List of dedicated fax numbers for the Facilities:  FACILITY NAME UR FAX NUMBER   ADMISSION DENIALS (Administrative/Medical Necessity) [phone]   DISCHARGE SUPPORT TEAM ([facility]) [phone]   PARENT CHILD HEALTH (Maternity/NICU/Pediatrics) [phone]   [facility] [phone]   [facility] [phone]   [facility] [phone]   [facility] [phone]   [facility] [phone]   [facility] [phone]   [facility] [phone]   [facility] [phone]    [facility] [phone]   [facility] [phone]   [facility] [phone]   [facility] [phone]

## 2024-02-09 ENCOUNTER — TELEPHONE (OUTPATIENT)
Dept: FAMILY MEDICINE CLINIC | Facility: CLINIC | Age: 54
End: 2024-02-09

## 2024-02-09 NOTE — TELEPHONE ENCOUNTER
----- Message from Emely Santoyo sent at 2/5/2024 12:12 PM EST -----  Regarding: RE: New Pt Appt/TCM and OPCM  Thank you! I will monitor if a PCP appt. has been made to establish care and assign OP CM at that time.    ----- Message -----  From: Cindy Baltazar RN  Sent: 2/2/2024   5:59 AM EST  To: Emely Santoyo; #  Subject: New Pt Appt/TCM and OPCM                         Pt without a PCP in need of same- has [facility] with instructions on his AVS to ask what Dr to call insurance about to update the PCP prior to his appointment.  He is admitted with DM, not taking his insulin as previously instructed, being a 30 day readmission for DM complications.  I have also asked for an OPCM to help pt in the community care for his DM.      Thanks  Cindy Baltazar RN [facility]

## 2024-02-12 ENCOUNTER — TELEPHONE (OUTPATIENT)
Dept: FAMILY MEDICINE CLINIC | Facility: CLINIC | Age: 54
End: 2024-02-12

## 2024-02-12 ENCOUNTER — OFFICE VISIT (OUTPATIENT)
Dept: FAMILY MEDICINE CLINIC | Facility: CLINIC | Age: 54
End: 2024-02-12

## 2024-02-12 VITALS
TEMPERATURE: 98.3 F | HEIGHT: 69 IN | HEART RATE: 89 BPM | DIASTOLIC BLOOD PRESSURE: 80 MMHG | OXYGEN SATURATION: 99 % | BODY MASS INDEX: 21.62 KG/M2 | WEIGHT: 146 LBS | SYSTOLIC BLOOD PRESSURE: 150 MMHG | RESPIRATION RATE: 16 BRPM

## 2024-02-12 DIAGNOSIS — Z71.89 COMPLEX CARE COORDINATION: Primary | ICD-10-CM

## 2024-02-12 DIAGNOSIS — E11.65 TYPE 2 DIABETES MELLITUS WITH HYPERGLYCEMIA, WITHOUT LONG-TERM CURRENT USE OF INSULIN (HCC): ICD-10-CM

## 2024-02-12 DIAGNOSIS — Z23 ENCOUNTER FOR IMMUNIZATION: ICD-10-CM

## 2024-02-12 DIAGNOSIS — Z76.89 ENCOUNTER FOR SUPPORT AND COORDINATION OF TRANSITION OF CARE: Primary | ICD-10-CM

## 2024-02-12 PROCEDURE — 3079F DIAST BP 80-89 MM HG: CPT | Performed by: FAMILY MEDICINE

## 2024-02-12 PROCEDURE — 3077F SYST BP >= 140 MM HG: CPT | Performed by: FAMILY MEDICINE

## 2024-02-12 PROCEDURE — 99214 OFFICE O/P EST MOD 30 MIN: CPT | Performed by: FAMILY MEDICINE

## 2024-02-12 RX ORDER — DULAGLUTIDE 0.75 MG/.5ML
0.75 INJECTION, SOLUTION SUBCUTANEOUS
Qty: 6 ML | Refills: 1 | Status: SHIPPED | OUTPATIENT
Start: 2024-02-12

## 2024-02-12 NOTE — PROGRESS NOTES
Assessment & Plan     1. Encounter for immunization  -     influenza vaccine, quadrivalent, 0.5 mL, preservative-free, for adult and pediatric patients 6 mos+ (AFLURIA, FLUARIX, FLULAVAL, FLUZONE)    2. Type 2 diabetes mellitus with hyperglycemia, without long-term current use of insulin ([facility])  Assessment & Plan:    Lab Results   Component Value Date    HGBA1C 15.7 (H) 01/26/2024     Currently on Lantus 10 units at night however reports to be taking 25 units.    At this point will discontinue Lantus at this time.  Will also discontinue metformin though patient not taking.  Will initiate treatment with Trulicity 0.75 mg.  Will also initiate treatment with Jardiance 25 mg.    Given that patient appropriately response to insulin therapy, patient likely has native pancreatic function.  Risk versus benefit in terms of taking patient off insulin and putting him on a more simple regimen will probably be the best route for this patient.  Will have patient back in 2 weeks to monitor for side effects of medication in addition to uptitrate his Trulicity.    Orders:  -     Empagliflozin 25 MG TABS; Take 1 tablet (25 mg total) by mouth daily  -     dulaglutide (Trulicity) 0.75 MG/0.5ML injection; Inject 0.5 mL (0.75 mg total) under the skin every 7 days    3. Encounter for support and coordination of transition of care  Comments:  Went over medication.  Gave instructions to patient.  Will follow-up in 2 weeks.         Subjective     Transitional Care Management Review:   Wes Araujo is a 54 y.o. male here for TCM follow up.     During the TCM phone call patient stated:  TCM Call       Date and time call was made  2/15/2019  9:33 AM    Patient was hospitialized at  [facility]    Date of Admission  02/02/19    Date of discharge  02/12/19    Diagnosis  Acuete kidney injury    Disposition  Home    Were the patients medications reviewed and updated  No    Current Symptoms  None          TCM Call       Post hospital  issues  None    Should patient be enrolled in anticoag monitoring?  No    Scheduled for follow up?  Yes    Did you obtain your prescribed medications  Yes    Do you need help managing your prescriptions or medications  No    Is transportation to your appointment needed  No    I have advised the patient to call PCP with any new or worsening symptoms  Wes Holguin CMA    Living Arrangements  Parents    Are you recieving any outpatient services  No    Are you recieving home care services  No    Are you using any community resources  No    Current waiver services  No    Have you fallen in the last 12 months  No    Interperter language line needed  No          Past medical history notable for type 2 diabetes recently admitted to the hospital with symptomatic hypoglycemia.  It was revealed during that time that patient was using 25 units of Lantus as opposed to his prescribed 10 units of Lantus.  Patient was discharged with Lantus and metformin.  During visit today, patient reports that he does not take his metformin medication.  He states that it gives him significant abdominal pain and will not take the medication.  Patient wants to continue insulin 25 units though he had hypoglycemia secondary to this.  Patient denies any current dysuria, polyuria, palpitation, or neurological deficits.      Review of Systems   Constitutional:  Negative for chills and fever.   HENT:  Negative for ear pain and sore throat.    Eyes:  Negative for pain and visual disturbance.   Respiratory:  Negative for cough and shortness of breath.    Cardiovascular:  Negative for chest pain and palpitations.   Gastrointestinal:  Negative for abdominal pain and vomiting.   Genitourinary:  Negative for dysuria and hematuria.   Musculoskeletal:  Negative for arthralgias and back pain.   Skin:  Negative for color change and rash.   Neurological:  Negative for seizures and syncope.   All other systems reviewed and are negative.      Objective     BP  "150/80 (BP Location: Right arm, Patient Position: Sitting, Cuff Size: Standard)   Pulse 89   Temp 98.3 °F (36.8 °C) (Temporal)   Resp 16   Ht 5' 9\" (1.753 m)   Wt 66.2 kg (146 lb)   SpO2 99%   BMI 21.56 kg/m²      Physical Exam  Vitals and nursing note reviewed.   Constitutional:       General: He is not in acute distress.     Appearance: He is well-developed.   HENT:      Head: Normocephalic and atraumatic.   Eyes:      Conjunctiva/sclera: Conjunctivae normal.   Cardiovascular:      Rate and Rhythm: Normal rate and regular rhythm.      Heart sounds: No murmur heard.  Pulmonary:      Effort: Pulmonary effort is normal. No respiratory distress.      Breath sounds: Normal breath sounds.   Abdominal:      Palpations: Abdomen is soft.      Tenderness: There is no abdominal tenderness.   Musculoskeletal:         General: No swelling.      Cervical back: Neck supple.      Right lower leg: Edema present.      Left lower leg: Edema present.   Skin:     General: Skin is warm and dry.      Capillary Refill: Capillary refill takes less than 2 seconds.   Neurological:      Mental Status: He is alert.   Psychiatric:         Mood and Affect: Mood normal.         Stuart Mata, DO         "

## 2024-02-12 NOTE — ASSESSMENT & PLAN NOTE
Lab Results   Component Value Date    HGBA1C 15.7 (H) 01/26/2024     Currently on Lantus 10 units at night however reports to be taking 25 units.    At this point will discontinue Lantus at this time.  Will also discontinue metformin though patient not taking.  Will initiate treatment with Trulicity 0.75 mg.  Will also initiate treatment with Jardiance 25 mg.    Given that patient appropriately response to insulin therapy, patient likely has native pancreatic function.  Risk versus benefit in terms of taking patient off insulin and putting him on a more simple regimen will probably be the best route for this patient.  Will have patient back in 2 weeks to monitor for side effects of medication in addition to uptitrate his Trulicity.

## 2024-02-12 NOTE — TELEPHONE ENCOUNTER
Discussed case briefly with PCP.  Patient was recently admitted for hypoglycemia.  He is on only Lantus 10 units daily and metformin.  Strongly suggest a robust workup to determine potential cause of hypoglycemia.    Patient may have responded very positively and he may be able to discontinue Lantus.  Recommend close follow-up.  Can consider CGM    Pharmacist Tracking Tool  Reason For Outreach: Embedded Pharmacist  Demographics:  Intervention Method: Chart Review  Type of Intervention: New  Topics Addressed: Diabetes  Pharmacologic Interventions: N/A  Non-Pharmacologic Interventions: Care coordination  Time:  Direct Patient Care:  0  mins  Care Coordination:  5  mins  Recommendation Recipient: Provider  Outcome: Pending/ Follow-up Required    Prince Rausch, PharmD, BCACP  Ambulatory Care Clinical Pharmacist

## 2024-02-14 ENCOUNTER — PATIENT OUTREACH (OUTPATIENT)
Dept: FAMILY MEDICINE CLINIC | Facility: CLINIC | Age: 54
End: 2024-02-14

## 2024-02-14 NOTE — PROGRESS NOTES
IP/HRR.    I called the patient using [employer] but his father answered the call.  Message was left asking him to have the patient return my call.   I will continue further outreach.    Chart reviewed.

## 2024-02-16 ENCOUNTER — PATIENT OUTREACH (OUTPATIENT)
Dept: FAMILY MEDICINE CLINIC | Facility: CLINIC | Age: 54
End: 2024-02-16

## 2024-02-16 NOTE — PROGRESS NOTES
HRR/IP.    I called the patient using [employer]ER[employer] assistant but received voicemail.  Message was left asking for a return call and also reminding him of his PCP appointment 2/19 at 0920. I am mailing the 'unable to reach' letter and await response.

## 2024-02-16 NOTE — LETTER
Date: 02/16/24    Dear Wes Araujo,   My name is Raegan; I am a registered nurse care manager working with [facility].   I have not been able to reach you and would like to set a time that I can talk with you over the phone.  My work is to help patients that have complex medical conditions get the care they need. This includes patients who may have been in the hospital or emergency room.    Please call me with any questions you may have.     Sincerely,  Raegan  [phone]  Outpatient Care Manager

## 2024-03-04 ENCOUNTER — PATIENT OUTREACH (OUTPATIENT)
Dept: FAMILY MEDICINE CLINIC | Facility: CLINIC | Age: 54
End: 2024-03-04

## 2024-03-04 NOTE — PROGRESS NOTES
HRR/IP referral.    No response from the patient after sending the UTR letter.  Case is being closed.

## 2024-03-08 ENCOUNTER — HOSPITAL ENCOUNTER (INPATIENT)
Facility: HOSPITAL | Age: 54
LOS: 6 days | Discharge: HOME | DRG: 469 | End: 2024-03-14
Attending: EMERGENCY MEDICINE | Admitting: INTERNAL MEDICINE
Payer: COMMERCIAL

## 2024-03-08 DIAGNOSIS — E16.2 HYPOGLYCEMIA: Primary | ICD-10-CM

## 2024-03-08 DIAGNOSIS — R74.01 TRANSAMINITIS: ICD-10-CM

## 2024-03-08 DIAGNOSIS — K86.1 CHRONIC PANCREATITIS, UNSPECIFIED PANCREATITIS TYPE (HCC): ICD-10-CM

## 2024-03-08 DIAGNOSIS — N18.32 STAGE 3B CHRONIC KIDNEY DISEASE (HCC): ICD-10-CM

## 2024-03-08 DIAGNOSIS — T68.XXXA HYPOTHERMIA, INITIAL ENCOUNTER: ICD-10-CM

## 2024-03-08 DIAGNOSIS — I10 ESSENTIAL HYPERTENSION: ICD-10-CM

## 2024-03-08 DIAGNOSIS — E55.9 VITAMIN D DEFICIENCY: ICD-10-CM

## 2024-03-08 DIAGNOSIS — N18.30 ACUTE RENAL FAILURE SUPERIMPOSED ON STAGE 3 CHRONIC KIDNEY DISEASE, UNSPECIFIED ACUTE RENAL FAILURE TYPE, UNSPECIFIED WHETHER STAGE 3A OR 3B CKD (HCC): ICD-10-CM

## 2024-03-08 DIAGNOSIS — M54.50 ACUTE LOW BACK PAIN WITHOUT SCIATICA, UNSPECIFIED BACK PAIN LATERALITY: ICD-10-CM

## 2024-03-08 DIAGNOSIS — N17.9 ACUTE RENAL FAILURE SUPERIMPOSED ON STAGE 3 CHRONIC KIDNEY DISEASE, UNSPECIFIED ACUTE RENAL FAILURE TYPE, UNSPECIFIED WHETHER STAGE 3A OR 3B CKD (HCC): ICD-10-CM

## 2024-03-08 DIAGNOSIS — K90.9 CHRONIC STEATORRHEA: ICD-10-CM

## 2024-03-08 PROBLEM — F10.139 ALCOHOL ABUSE WITH WITHDRAWAL (HCC): Status: ACTIVE | Noted: 2024-01-25

## 2024-03-08 LAB
ALBUMIN SERPL BCP-MCNC: 3.7 G/DL (ref 3.5–5)
ALP SERPL-CCNC: 117 U/L (ref 34–104)
ALT SERPL W P-5'-P-CCNC: 72 U/L (ref 7–52)
ANION GAP SERPL CALCULATED.3IONS-SCNC: 7 MMOL/L
APTT PPP: 24 SECONDS (ref 23–37)
AST SERPL W P-5'-P-CCNC: 50 U/L (ref 13–39)
ATRIAL RATE: 76 BPM
BASOPHILS # BLD AUTO: 0.02 THOUSANDS/ÂΜL (ref 0–0.1)
BASOPHILS NFR BLD AUTO: 0 % (ref 0–1)
BILIRUB SERPL-MCNC: 0.33 MG/DL (ref 0.2–1)
BUN SERPL-MCNC: 21 MG/DL (ref 5–25)
CALCIUM SERPL-MCNC: 8.5 MG/DL (ref 8.4–10.2)
CHLORIDE SERPL-SCNC: 109 MMOL/L (ref 96–108)
CK SERPL-CCNC: 63 U/L (ref 39–308)
CO2 SERPL-SCNC: 21 MMOL/L (ref 21–32)
CREAT SERPL-MCNC: 1.38 MG/DL (ref 0.6–1.3)
EOSINOPHIL # BLD AUTO: 0.15 THOUSAND/ÂΜL (ref 0–0.61)
EOSINOPHIL NFR BLD AUTO: 2 % (ref 0–6)
ERYTHROCYTE [DISTWIDTH] IN BLOOD BY AUTOMATED COUNT: 11.8 % (ref 11.6–15.1)
ETHANOL SERPL-MCNC: <10 MG/DL
GFR SERPL CREATININE-BSD FRML MDRD: 57 ML/MIN/1.73SQ M
GLUCOSE SERPL-MCNC: 103 MG/DL (ref 65–140)
GLUCOSE SERPL-MCNC: 112 MG/DL (ref 65–140)
GLUCOSE SERPL-MCNC: 118 MG/DL (ref 65–140)
GLUCOSE SERPL-MCNC: 133 MG/DL (ref 65–140)
GLUCOSE SERPL-MCNC: 142 MG/DL (ref 65–140)
GLUCOSE SERPL-MCNC: 151 MG/DL (ref 65–140)
GLUCOSE SERPL-MCNC: 168 MG/DL (ref 65–140)
GLUCOSE SERPL-MCNC: 179 MG/DL (ref 65–140)
GLUCOSE SERPL-MCNC: 200 MG/DL (ref 65–140)
GLUCOSE SERPL-MCNC: 216 MG/DL (ref 65–140)
GLUCOSE SERPL-MCNC: 225 MG/DL (ref 65–140)
GLUCOSE SERPL-MCNC: 25 MG/DL (ref 65–140)
GLUCOSE SERPL-MCNC: 31 MG/DL (ref 65–140)
GLUCOSE SERPL-MCNC: 40 MG/DL (ref 65–140)
GLUCOSE SERPL-MCNC: 48 MG/DL (ref 65–140)
GLUCOSE SERPL-MCNC: 50 MG/DL (ref 65–140)
GLUCOSE SERPL-MCNC: 52 MG/DL (ref 65–140)
GLUCOSE SERPL-MCNC: 59 MG/DL (ref 65–140)
GLUCOSE SERPL-MCNC: 61 MG/DL (ref 65–140)
GLUCOSE SERPL-MCNC: 77 MG/DL (ref 65–140)
GLUCOSE SERPL-MCNC: 79 MG/DL (ref 65–140)
GLUCOSE SERPL-MCNC: 79 MG/DL (ref 65–140)
GLUCOSE SERPL-MCNC: 81 MG/DL (ref 65–140)
GLUCOSE SERPL-MCNC: 83 MG/DL (ref 65–140)
GLUCOSE SERPL-MCNC: 89 MG/DL (ref 65–140)
GLUCOSE SERPL-MCNC: 94 MG/DL (ref 65–140)
GLUCOSE SERPL-MCNC: 94 MG/DL (ref 65–140)
GLUCOSE SERPL-MCNC: <20 MG/DL (ref 65–140)
HCT VFR BLD AUTO: 35.2 % (ref 36.5–49.3)
HGB BLD-MCNC: 12.1 G/DL (ref 12–17)
IMM GRANULOCYTES # BLD AUTO: 0.04 THOUSAND/UL (ref 0–0.2)
IMM GRANULOCYTES NFR BLD AUTO: 0 % (ref 0–2)
INR PPP: 0.97 (ref 0.84–1.19)
INSULIN SERPL-ACNC: 44.85 UIU/ML
LYMPHOCYTES # BLD AUTO: 3.06 THOUSANDS/ÂΜL (ref 0.6–4.47)
LYMPHOCYTES NFR BLD AUTO: 33 % (ref 14–44)
MCH RBC QN AUTO: 31 PG (ref 26.8–34.3)
MCHC RBC AUTO-ENTMCNC: 34.4 G/DL (ref 31.4–37.4)
MCV RBC AUTO: 90 FL (ref 82–98)
MONOCYTES # BLD AUTO: 0.71 THOUSAND/ÂΜL (ref 0.17–1.22)
MONOCYTES NFR BLD AUTO: 8 % (ref 4–12)
NEUTROPHILS # BLD AUTO: 5.21 THOUSANDS/ÂΜL (ref 1.85–7.62)
NEUTS SEG NFR BLD AUTO: 57 % (ref 43–75)
NRBC BLD AUTO-RTO: 0 /100 WBCS
P AXIS: 82 DEGREES
PLATELET # BLD AUTO: 229 THOUSANDS/UL (ref 149–390)
PMV BLD AUTO: 10.1 FL (ref 8.9–12.7)
POTASSIUM SERPL-SCNC: 3.7 MMOL/L (ref 3.5–5.3)
PR INTERVAL: 146 MS
PROT SERPL-MCNC: 6.5 G/DL (ref 6.4–8.4)
PROTHROMBIN TIME: 13.1 SECONDS (ref 11.6–14.5)
QRS AXIS: 41 DEGREES
QRSD INTERVAL: 90 MS
QT INTERVAL: 436 MS
QTC INTERVAL: 490 MS
RBC # BLD AUTO: 3.9 MILLION/UL (ref 3.88–5.62)
SODIUM SERPL-SCNC: 137 MMOL/L (ref 135–147)
T WAVE AXIS: 44 DEGREES
VENTRICULAR RATE: 76 BPM
WBC # BLD AUTO: 9.19 THOUSAND/UL (ref 4.31–10.16)

## 2024-03-08 PROCEDURE — 80053 COMPREHEN METABOLIC PANEL: CPT | Performed by: EMERGENCY MEDICINE

## 2024-03-08 PROCEDURE — 36415 COLL VENOUS BLD VENIPUNCTURE: CPT | Performed by: EMERGENCY MEDICINE

## 2024-03-08 PROCEDURE — 93005 ELECTROCARDIOGRAM TRACING: CPT

## 2024-03-08 PROCEDURE — 96365 THER/PROPH/DIAG IV INF INIT: CPT

## 2024-03-08 PROCEDURE — 99223 1ST HOSP IP/OBS HIGH 75: CPT | Performed by: INTERNAL MEDICINE

## 2024-03-08 PROCEDURE — 84681 ASSAY OF C-PEPTIDE: CPT

## 2024-03-08 PROCEDURE — 96376 TX/PRO/DX INJ SAME DRUG ADON: CPT

## 2024-03-08 PROCEDURE — 80377 DRUG/SUBSTANCE NOS 7/MORE: CPT

## 2024-03-08 PROCEDURE — 85025 COMPLETE CBC W/AUTO DIFF WBC: CPT | Performed by: EMERGENCY MEDICINE

## 2024-03-08 PROCEDURE — 85610 PROTHROMBIN TIME: CPT | Performed by: EMERGENCY MEDICINE

## 2024-03-08 PROCEDURE — 96366 THER/PROPH/DIAG IV INF ADDON: CPT

## 2024-03-08 PROCEDURE — 93010 ELECTROCARDIOGRAM REPORT: CPT | Performed by: INTERNAL MEDICINE

## 2024-03-08 PROCEDURE — 82948 REAGENT STRIP/BLOOD GLUCOSE: CPT

## 2024-03-08 PROCEDURE — 99291 CRITICAL CARE FIRST HOUR: CPT | Performed by: EMERGENCY MEDICINE

## 2024-03-08 PROCEDURE — 83525 ASSAY OF INSULIN: CPT

## 2024-03-08 PROCEDURE — 84206 ASSAY OF PROINSULIN: CPT

## 2024-03-08 PROCEDURE — 85730 THROMBOPLASTIN TIME PARTIAL: CPT | Performed by: EMERGENCY MEDICINE

## 2024-03-08 PROCEDURE — 96375 TX/PRO/DX INJ NEW DRUG ADDON: CPT

## 2024-03-08 PROCEDURE — 96361 HYDRATE IV INFUSION ADD-ON: CPT

## 2024-03-08 PROCEDURE — 99285 EMERGENCY DEPT VISIT HI MDM: CPT

## 2024-03-08 PROCEDURE — 82077 ASSAY SPEC XCP UR&BREATH IA: CPT | Performed by: EMERGENCY MEDICINE

## 2024-03-08 PROCEDURE — 82550 ASSAY OF CK (CPK): CPT | Performed by: EMERGENCY MEDICINE

## 2024-03-08 RX ORDER — INSULIN LISPRO 100 [IU]/ML
1-5 INJECTION, SOLUTION INTRAVENOUS; SUBCUTANEOUS
Status: DISCONTINUED | OUTPATIENT
Start: 2024-03-08 | End: 2024-03-09

## 2024-03-08 RX ORDER — DEXTROSE MONOHYDRATE 25 G/50ML
50 INJECTION, SOLUTION INTRAVENOUS ONCE
Status: COMPLETED | OUTPATIENT
Start: 2024-03-08 | End: 2024-03-08

## 2024-03-08 RX ORDER — HEPARIN SODIUM 5000 [USP'U]/ML
5000 INJECTION, SOLUTION INTRAVENOUS; SUBCUTANEOUS EVERY 8 HOURS SCHEDULED
Status: DISCONTINUED | OUTPATIENT
Start: 2024-03-08 | End: 2024-03-14 | Stop reason: HOSPADM

## 2024-03-08 RX ORDER — DEXTROSE, SODIUM CHLORIDE, SODIUM LACTATE, POTASSIUM CHLORIDE, AND CALCIUM CHLORIDE 5; .6; .31; .03; .02 G/100ML; G/100ML; G/100ML; G/100ML; G/100ML
250 INJECTION, SOLUTION INTRAVENOUS CONTINUOUS
Status: DISCONTINUED | OUTPATIENT
Start: 2024-03-08 | End: 2024-03-08

## 2024-03-08 RX ORDER — DEXTROSE MONOHYDRATE 25 G/50ML
INJECTION, SOLUTION INTRAVENOUS
Status: COMPLETED
Start: 2024-03-08 | End: 2024-03-08

## 2024-03-08 RX ORDER — LISINOPRIL 10 MG/1
10 TABLET ORAL DAILY
Status: DISCONTINUED | OUTPATIENT
Start: 2024-03-09 | End: 2024-03-08

## 2024-03-08 RX ORDER — INSULIN GLARGINE 100 [IU]/ML
10 INJECTION, SOLUTION SUBCUTANEOUS
Status: DISCONTINUED | OUTPATIENT
Start: 2024-03-08 | End: 2024-03-08

## 2024-03-08 RX ORDER — INSULIN LISPRO 100 [IU]/ML
1-5 INJECTION, SOLUTION INTRAVENOUS; SUBCUTANEOUS
Status: DISCONTINUED | OUTPATIENT
Start: 2024-03-09 | End: 2024-03-09

## 2024-03-08 RX ORDER — LISINOPRIL 10 MG/1
10 TABLET ORAL DAILY
Status: DISCONTINUED | OUTPATIENT
Start: 2024-03-08 | End: 2024-03-11

## 2024-03-08 RX ORDER — DIAZEPAM 5 MG/ML
5 INJECTION, SOLUTION INTRAMUSCULAR; INTRAVENOUS ONCE
Status: COMPLETED | OUTPATIENT
Start: 2024-03-08 | End: 2024-03-08

## 2024-03-08 RX ORDER — CHLORHEXIDINE GLUCONATE ORAL RINSE 1.2 MG/ML
15 SOLUTION DENTAL EVERY 12 HOURS SCHEDULED
Status: DISCONTINUED | OUTPATIENT
Start: 2024-03-08 | End: 2024-03-14

## 2024-03-08 RX ADMIN — SODIUM CHLORIDE 100 ML/HR: 4 INJECTION, SOLUTION, CONCENTRATE INTRAVENOUS at 08:56

## 2024-03-08 RX ADMIN — DEXTROSE MONOHYDRATE 50 ML: 25 INJECTION, SOLUTION INTRAVENOUS at 07:53

## 2024-03-08 RX ADMIN — DEXTROSE MONOHYDRATE 50 ML: 25 INJECTION, SOLUTION INTRAVENOUS at 10:17

## 2024-03-08 RX ADMIN — SODIUM CHLORIDE, SODIUM LACTATE, POTASSIUM CHLORIDE, AND CALCIUM CHLORIDE 1000 ML: .6; .31; .03; .02 INJECTION, SOLUTION INTRAVENOUS at 05:23

## 2024-03-08 RX ADMIN — DEXTROSE, SODIUM CHLORIDE, SODIUM LACTATE, POTASSIUM CHLORIDE, AND CALCIUM CHLORIDE 250 ML/HR: 5; .6; .31; .03; .02 INJECTION, SOLUTION INTRAVENOUS at 07:45

## 2024-03-08 RX ADMIN — DEXTROSE MONOHYDRATE 50 ML: 25 INJECTION, SOLUTION INTRAVENOUS at 11:12

## 2024-03-08 RX ADMIN — LISINOPRIL 10 MG: 10 TABLET ORAL at 18:36

## 2024-03-08 RX ADMIN — CHLORHEXIDINE GLUCONATE 15 ML: 1.2 RINSE ORAL at 21:03

## 2024-03-08 RX ADMIN — DEXTROSE MONOHYDRATE 50 ML: 25 INJECTION, SOLUTION INTRAVENOUS at 08:34

## 2024-03-08 RX ADMIN — HEPARIN SODIUM 5000 UNITS: 5000 INJECTION INTRAVENOUS; SUBCUTANEOUS at 21:03

## 2024-03-08 RX ADMIN — DEXTROSE MONOHYDRATE 50 ML: 25 INJECTION, SOLUTION INTRAVENOUS at 09:27

## 2024-03-08 RX ADMIN — DIAZEPAM 5 MG: 10 INJECTION, SOLUTION INTRAMUSCULAR; INTRAVENOUS at 05:26

## 2024-03-08 RX ADMIN — DEXTROSE MONOHYDRATE 50 ML: 25 INJECTION, SOLUTION INTRAVENOUS at 05:28

## 2024-03-08 RX ADMIN — DEXTROSE MONOHYDRATE 50 ML: 25 INJECTION, SOLUTION INTRAVENOUS at 06:43

## 2024-03-08 RX ADMIN — DEXTROSE MONOHYDRATE 50 ML: 25 INJECTION, SOLUTION INTRAVENOUS at 18:21

## 2024-03-08 NOTE — ED PROVIDER NOTES
"History  Chief Complaint   Patient presents with    Medical Problem     Pt arriving via ems where ems reports on arrival pt had a BS of 34, after IM glucagon ems reports . Pt diaphoretic and shaking in triage and not cooperative in answering triage questions. Pt only stating \"Im cold\"     54-year-old male brought to the emergency department for hypoglycemia.  When EMS arrived, they found his blood sugar to be 34.  Patient given IM glucagon with good response.  Blood sugar was 208 in the field after glucagon.  On arrival, the patient is awake.  Patient not answering questions despite staff trying to communicate in Yi.  When asking a question, the patient looks at me and then looks away.        Prior to Admission Medications   Prescriptions Last Dose Informant Patient Reported? Taking?   Alcohol Swabs 70 % PADS   No No   Sig: May substitute brand based on insurance coverage. Check glucose TID.   Blood Glucose Monitoring Suppl (OneTouch Verio Reflect) w/Device KIT   No No   Sig: May substitute brand based on insurance coverage. Check glucose TID.   Empagliflozin 25 MG TABS   No No   Sig: Take 1 tablet (25 mg total) by mouth daily   Insulin Pen Needle (BD Pen Needle Claudia 2nd Gen) 32G X 4 MM MISC   No No   Sig: For use with insulin pen. Pharmacy may dispense brand covered by insurance.   Insulin Pen Needle (BD Pen Needle Claudia 2nd Gen) 32G X 4 MM MISC   No No   Sig: For use with insulin pen. Pharmacy may dispense brand covered by insurance.   Lancets (accu-chek multiclix) lancets   No No   Sig: Check blood sugar 3 times a day   Patient not taking: Reported on 1/25/2024   Multiple Vitamin (multivitamin) tablet   No No   Sig: Take 1 tablet by mouth daily   OneTouch Delica Lancets 33G MISC   No No   Sig: May substitute brand based on insurance coverage. Check glucose TID.   dulaglutide (Trulicity) 0.75 MG/0.5ML injection   No No   Sig: Inject 0.5 mL (0.75 mg total) under the skin every 7 days   glucose blood " (OneTouch Verio) test strip   No No   Sig: May substitute brand based on insurance coverage. Check glucose TID.   lisinopril (ZESTRIL) 10 mg tablet   No No   Sig: Take 1 tablet (10 mg total) by mouth daily      Facility-Administered Medications: None       Past Medical History:   Diagnosis Date    Alcohol abuse     Chronic pancreatitis (HCC)     Diabetes mellitus (HCC)     Type 2    Pancreatitis     Paroxysmal A-fib (HCC)     Thrombocytopenia (HCC)        Past Surgical History:   Procedure Laterality Date    INCISION AND DRAINAGE OF WOUND N/A 8/16/2020    Procedure: INCISION AND DRAINAGE (I&D) HEAD/FACE;  Surgeon: Estrada Walton DMD;  Location: [facility];  Service: Maxillofacial    IR BIOPSY BONE MARROW  2/7/2019    REMOVAL OF IMPACTED TOOTH - COMPLETELY BONY N/A 8/16/2020    Procedure: EXTRACTION TEETH MULTIPLE #2, 3;  Surgeon: Estrada Walton DMD;  Location: [facility];  Service: Maxillofacial       Family History   Problem Relation Age of Onset    Diabetes Mother     Hypertension Mother     Hyperlipidemia Father      I have reviewed and agree with the history as documented.    E-Cigarette/Vaping     E-Cigarette/Vaping Substances     Social History     Tobacco Use    Smoking status: Former     Passive exposure: Past    Smokeless tobacco: Never   Substance Use Topics    Alcohol use: Yes    Drug use: Yes     Types: Cocaine       Review of Systems   Unable to perform ROS: Patient nonverbal       Physical Exam  Physical Exam  Vitals and nursing note reviewed.   Constitutional:       General: He is not in acute distress.     Appearance: He is well-developed. He is ill-appearing and diaphoretic.   HENT:      Head: Normocephalic and atraumatic.      Mouth/Throat:      Lips: Pink.      Mouth: Mucous membranes are moist.   Eyes:      General: Lids are normal.      Extraocular Movements: Extraocular movements intact.      Conjunctiva/sclera: Conjunctivae normal.      Pupils: Pupils are equal, round, and reactive to light.    Cardiovascular:      Rate and Rhythm: Normal rate and regular rhythm.      Heart sounds: Normal heart sounds. No murmur heard.  Pulmonary:      Effort: Pulmonary effort is normal.      Breath sounds: Normal breath sounds.   Abdominal:      General: There is no distension.      Palpations: Abdomen is soft.      Tenderness: There is no abdominal tenderness. There is no guarding or rebound.   Musculoskeletal:         General: No swelling.      Cervical back: Full passive range of motion without pain, normal range of motion and neck supple.   Skin:     General: Skin is warm.      Capillary Refill: Capillary refill takes less than 2 seconds.      Findings: No rash.   Neurological:      General: No focal deficit present.      Mental Status: He is alert.         Vital Signs  ED Triage Vitals   Temperature Pulse Respirations Blood Pressure SpO2   03/08/24 0519 03/08/24 0514 03/08/24 0514 03/08/24 0514 03/08/24 0514   (!) 91 °F (32.8 °C) 70 16 165/72 100 %      Temp Source Heart Rate Source Patient Position - Orthostatic VS BP Location FiO2 (%)   03/08/24 0519 03/08/24 0514 03/08/24 0514 03/08/24 0514 --   Rectal Monitor Lying Right arm       Pain Score       03/08/24 0753       No Pain           Vitals:    03/08/24 1500 03/08/24 1600 03/08/24 1700 03/08/24 1800   BP: 158/78 157/73 149/89 170/75   Pulse: 98 90 90 90   Patient Position - Orthostatic VS:             Visual Acuity      ED Medications  Medications   dextrose 10 % and normal saline infusion (150 mL/hr Intravenous Rate/Dose Change 3/8/24 1115)   chlorhexidine (PERIDEX) 0.12 % oral rinse 15 mL (15 mL Mouth/Throat Given 3/8/24 2103)   heparin (porcine) subcutaneous injection 5,000 Units (5,000 Units Subcutaneous Given 3/8/24 2103)   insulin lispro (HumALOG/ADMELOG) 100 units/mL subcutaneous injection 1-5 Units ( Subcutaneous Not Given 3/8/24 1633)   insulin lispro (HumALOG/ADMELOG) 100 units/mL subcutaneous injection 1-5 Units (has no administration in time range)    lisinopril (ZESTRIL) tablet 10 mg (10 mg Oral Given 3/8/24 1836)   glucagon (FOR EMS ONLY) (GLUCAGEN) injection 1 mg (0 mg Does not apply Given to EMS 3/8/24 0515)   lactated ringers bolus 1,000 mL (0 mL Intravenous Stopped 3/8/24 0717)   diazepam (VALIUM) injection 5 mg (5 mg Intravenous Given 3/8/24 0526)   dextrose 50 % IV solution 50 mL (50 mL Intravenous Given 3/8/24 0528)   dextrose 50 % IV solution 50 mL (50 mL Intravenous Given 3/8/24 0643)   dextrose 50 % IV solution 50 mL (50 mL Intravenous Given 3/8/24 0753)   dextrose 50 % IV solution 50 mL (50 mL Intravenous Given 3/8/24 0834)   dextrose 50 % IV solution 50 mL (50 mL Intravenous Given 3/8/24 0927)   dextrose 50 % IV solution 50 mL (50 mL Intravenous Given 3/8/24 1017)   dextrose 50 % IV solution 50 mL (50 mL Intravenous Given 3/8/24 1112)   dextrose 50 % IV solution **ADS Override Pull** (50 mL  Given 3/8/24 1821)       Diagnostic Studies  Results Reviewed       Procedure Component Value Units Date/Time    Fingerstick Glucose (POCT) [[id number]]  (Abnormal) Collected: 03/08/24 1213    Lab Status: Final result Updated: 03/08/24 1214     POC Glucose 50 mg/dl     Fingerstick Glucose (POCT) [[id number]]  (Normal) Collected: 03/08/24 1137    Lab Status: Final result Updated: 03/08/24 1139     POC Glucose 77 mg/dl     Fingerstick Glucose (POCT) [[id number]]  (Abnormal) Collected: 03/08/24 1102    Lab Status: Final result Updated: 03/08/24 1103     POC Glucose 40 mg/dl     Fingerstick Glucose (POCT) [[id number]]  (Abnormal) Collected: 03/08/24 1026    Lab Status: Final result Updated: 03/08/24 1027     POC Glucose 179 mg/dl     Fingerstick Glucose (POCT) [[id number]]  (Abnormal) Collected: 03/08/24 1008    Lab Status: Final result Updated: 03/08/24 1009     POC Glucose 52 mg/dl     Fingerstick Glucose (POCT) [[id number]]  (Normal) Collected: 03/08/24 0949    Lab Status: Final result Updated: 03/08/24 0950     POC Glucose 112 mg/dl     Fingerstick Glucose (POCT)  [[id number]]  (Abnormal) Collected: 03/08/24 0933    Lab Status: Final result Updated: 03/08/24 0934     POC Glucose 225 mg/dl     Fingerstick Glucose (POCT) [[id number]]  (Abnormal) Collected: 03/08/24 0920    Lab Status: Final result Updated: 03/08/24 0921     POC Glucose 48 mg/dl     Fingerstick Glucose (POCT) [[id number]]  (Normal) Collected: 03/08/24 0858    Lab Status: Final result Updated: 03/08/24 0859     POC Glucose 103 mg/dl     Fingerstick Glucose (POCT) [[id number]]  (Abnormal) Collected: 03/08/24 0831    Lab Status: Final result Updated: 03/08/24 0832     POC Glucose 31 mg/dl     Fingerstick Glucose (POCT) [[id number]]  (Normal) Collected: 03/08/24 0812    Lab Status: Final result Updated: 03/08/24 0813     POC Glucose 79 mg/dl     Fingerstick Glucose (POCT) [[id number]]  (Abnormal) Collected: 03/08/24 0748    Lab Status: Final result Updated: 03/08/24 0749     POC Glucose <20 mg/dl     Fingerstick Glucose (POCT) [[id number]]  (Abnormal) Collected: 03/08/24 0716    Lab Status: Final result Updated: 03/08/24 0718     POC Glucose 59 mg/dl     Fingerstick Glucose (POCT) [[id number]]  (Abnormal) Collected: 03/08/24 0636    Lab Status: Final result Updated: 03/08/24 0637     POC Glucose 61 mg/dl     Comprehensive metabolic panel [[id number]]  (Abnormal) Collected: 03/08/24 0522    Lab Status: Final result Specimen: Blood from Arm, Left Updated: 03/08/24 0550     Sodium 137 mmol/L      Potassium 3.7 mmol/L      Chloride 109 mmol/L      CO2 21 mmol/L      ANION GAP 7 mmol/L      BUN 21 mg/dL      Creatinine 1.38 mg/dL      Glucose 81 mg/dL      Calcium 8.5 mg/dL      AST 50 U/L      ALT 72 U/L      Alkaline Phosphatase 117 U/L      Total Protein 6.5 g/dL      Albumin 3.7 g/dL      Total Bilirubin 0.33 mg/dL      eGFR 57 ml/min/1.73sq m     Narrative:      [employer] guidelines for Chronic Kidney Disease (CKD):     Stage 1 with normal or high GFR (GFR > 90 mL/min/1.73 square meters)    Stage 2  Mild CKD (GFR = 60-89 mL/min/1.73 square meters)    Stage 3A Moderate CKD (GFR = 45-59 mL/min/1.73 square meters)    Stage 3B Moderate CKD (GFR = 30-44 mL/min/1.73 square meters)    Stage 4 Severe CKD (GFR = 15-29 mL/min/1.73 square meters)    Stage 5 End Stage CKD (GFR <15 mL/min/1.73 square meters)  Note: GFR calculation is accurate only with a steady state creatinine    CK [[id number]]  (Normal) Collected: 03/08/24 0522    Lab Status: Final result Specimen: Blood from Arm, Left Updated: 03/08/24 0550     Total CK 63 U/L     Fingerstick Glucose (POCT) [[id number]]  (Abnormal) Collected: 03/08/24 0546    Lab Status: Final result Updated: 03/08/24 0550     POC Glucose 216 mg/dl     Ethanol [[id number]]  (Normal) Collected: 03/08/24 0522    Lab Status: Final result Specimen: Blood from Arm, Left Updated: 03/08/24 0549     Ethanol Lvl <10 mg/dL     Protime-INR [[id number]]  (Normal) Collected: 03/08/24 0522    Lab Status: Final result Specimen: Blood from Arm, Left Updated: 03/08/24 0542     Protime 13.1 seconds      INR 0.97    APTT [[id number]]  (Normal) Collected: 03/08/24 0522    Lab Status: Final result Specimen: Blood from Arm, Left Updated: 03/08/24 0542     PTT 24 seconds     CBC and differential [[id number]]  (Abnormal) Collected: 03/08/24 0522    Lab Status: Final result Specimen: Blood from Arm, Left Updated: 03/08/24 0529     WBC 9.19 Thousand/uL      RBC 3.90 Million/uL      Hemoglobin 12.1 g/dL      Hematocrit 35.2 %      MCV 90 fL      MCH 31.0 pg      MCHC 34.4 g/dL      RDW 11.8 %      MPV 10.1 fL      Platelets 229 Thousands/uL      nRBC 0 /100 WBCs      Neutrophils Relative 57 %      Immat GRANS % 0 %      Lymphocytes Relative 33 %      Monocytes Relative 8 %      Eosinophils Relative 2 %      Basophils Relative 0 %      Neutrophils Absolute 5.21 Thousands/µL      Immature Grans Absolute 0.04 Thousand/uL      Lymphocytes Absolute 3.06 Thousands/µL      Monocytes Absolute 0.71 Thousand/µL      Eosinophils  Absolute 0.15 Thousand/µL      Basophils Absolute 0.02 Thousands/µL     Fingerstick Glucose (POCT) [[id number]]  (Normal) Collected: 03/08/24 0516    Lab Status: Final result Updated: 03/08/24 0517     POC Glucose 133 mg/dl                    No orders to display              Procedures  ECG 12 Lead Documentation Only    Date/Time: 3/8/2024 5:27 AM    Performed by: Isma Mejia MD  Authorized by: Isma Mejia MD    ECG reviewed by me, the ED Provider: yes    Patient location:  ED  Previous ECG:     Previous ECG:  Compared to current    Comparison ECG info:  2/1/24    Similarity:  No change    Comparison to cardiac monitor: Yes    Interpretation:     Interpretation: normal    Quality:     Tracing quality:  Limited by artifact  Rate:     ECG rate:  76    ECG rate assessment: normal    Rhythm:     Rhythm: sinus rhythm    Ectopy:     Ectopy: none    QRS:     QRS axis:  Normal    QRS intervals:  Normal  Conduction:     Conduction: normal    ST segments:     ST segments:  Normal  T waves:     T waves: normal    CriticalCare Time    Date/Time: 3/8/2024 5:28 AM    Performed by: Isma Mejia MD  Authorized by: Isma Mejia MD    Critical care provider statement:     Critical care time (minutes):  48    Critical care time was exclusive of:  Separately billable procedures and treating other patients    Critical care was necessary to treat or prevent imminent or life-threatening deterioration of the following conditions:  Endocrine crisis and metabolic crisis    Critical care was time spent personally by me on the following activities:  Obtaining history from patient or surrogate, development of treatment plan with patient or surrogate, evaluation of patient's response to treatment, examination of patient, review of old charts, re-evaluation of patient's condition and ordering and review of laboratory studies    I assumed direction of critical care for this patient from another provider in my specialty: no              ED Course                                             Medical Decision Making  Symptomatic hypoglycemia versus polysubstance abuse versus alcohol withdrawal versus rhabdomyolysis.  -Patient noted to be hypothermic likely related to his hypoglycemic state.  Patient's glucose on arrival is 133, but will give 50 mL of D50.  Also, warm blankets in the meantime to increase body temperature.  -CBC to evaluate for evidence of marked leukocytosis or anemia.  -CMP to evaluate electrolytes, renal function, LFTs in the setting of possible prolonged hypoglycemic state.  Also to evaluate for other metabolic abnormalities.  -CK to evaluate for rhabdomyolysis.  -Check ethanol level as the patient does smell of alcohol.  -EKG to evaluate for arrhythmia.  -IV Valium for possible alcohol withdrawal.  -IV fluids for likely dehydration, and for possible rhabdomyolysis.    Problems Addressed:  Hypoglycemia: complicated acute illness or injury with systemic symptoms that poses a threat to life or bodily functions  Hypothermia, initial encounter: complicated acute illness or injury with systemic symptoms that poses a threat to life or bodily functions    Amount and/or Complexity of Data Reviewed  Labs: ordered.    Risk  Prescription drug management.  Decision regarding hospitalization.             Disposition  Final diagnoses:   Hypoglycemia   Hypothermia, initial encounter     Time reflects when diagnosis was documented in both MDM as applicable and the Disposition within this note       Time User Action Codes Description Comment    3/8/2024  5:51 AM Isma Mejia Add [E16.2] Hypoglycemia     3/8/2024  5:51 AM Isma Mejia Add [T68.XXXA] Hypothermia, initial encounter           ED Disposition       ED Disposition   Admit    Condition   Critical    Date/Time   Fri Mar 8, 2024  8:41 AM    Comment   Case was discussed with Dr. Rich and the patient's admission status was agreed to be Admission Status: inpatient status to the  service of Dr. Rich .               Follow-up Information       Follow up With Specialties Details Why Contact Info    [facility]  Follow up  [address]  [place] [address]  [phone]            Current Discharge Medication List        CONTINUE these medications which have NOT CHANGED    Details   Alcohol Swabs 70 % PADS May substitute brand based on insurance coverage. Check glucose TID.  Qty: 100 each, Refills: 0    Associated Diagnoses: Type 2 diabetes mellitus with hyperosmolarity without coma, without long-term current use of insulin ([facility])      Blood Glucose Monitoring Suppl (OneTouch Verio Reflect) w/Device KIT May substitute brand based on insurance coverage. Check glucose TID.  Qty: 1 kit, Refills: 0    Associated Diagnoses: Type 2 diabetes mellitus with hyperosmolarity without coma, without long-term current use of insulin ([facility])      dulaglutide (Trulicity) 0.75 MG/0.5ML injection Inject 0.5 mL (0.75 mg total) under the skin every 7 days  Qty: 6 mL, Refills: 1    Associated Diagnoses: Type 2 diabetes mellitus with hyperglycemia, without long-term current use of insulin ([facility])      Empagliflozin 25 MG TABS Take 1 tablet (25 mg total) by mouth daily  Qty: 30 tablet, Refills: 5    Associated Diagnoses: Type 2 diabetes mellitus with hyperglycemia, without long-term current use of insulin ([facility])      glucose blood (OneTouch Verio) test strip May substitute brand based on insurance coverage. Check glucose TID.  Qty: 100 each, Refills: 0    Associated Diagnoses: Type 2 diabetes mellitus with hyperosmolarity without coma, without long-term current use of insulin ([facility])      !! Insulin Pen Needle (BD Pen Needle Claudia 2nd Gen) 32G X 4 MM MISC For use with insulin pen. Pharmacy may dispense brand covered by insurance.  Qty: 100 each, Refills: 0    Associated Diagnoses: Type 2 diabetes mellitus with hyperosmolarity without coma, without long-term current use of insulin ([facility])      !!  Insulin Pen Needle (BD Pen Needle Claudia 2nd Gen) 32G X 4 MM MISC For use with insulin pen. Pharmacy may dispense brand covered by insurance.  Qty: 100 each, Refills: 0    Associated Diagnoses: Type 2 diabetes mellitus with hyperosmolarity without coma, without long-term current use of insulin (HCC)      !! Lancets (accu-chek multiclix) lancets Check blood sugar 3 times a day  Qty: 100 each, Refills: 0    Associated Diagnoses: Type 2 diabetes mellitus with hyperglycemia, with long-term current use of insulin (HCC)      lisinopril (ZESTRIL) 10 mg tablet Take 1 tablet (10 mg total) by mouth daily  Qty: 30 tablet, Refills: 0    Associated Diagnoses: Essential hypertension      Multiple Vitamin (multivitamin) tablet Take 1 tablet by mouth daily  Qty: 30 tablet, Refills: 0    Associated Diagnoses: Type 2 diabetes mellitus with hyperosmolarity without coma, without long-term current use of insulin (HCC)      !! OneTouch Delica Lancets 33G MISC May substitute brand based on insurance coverage. Check glucose TID.  Qty: 100 each, Refills: 0    Associated Diagnoses: Type 2 diabetes mellitus with hyperosmolarity without coma, without long-term current use of insulin (HCC)       !! - Potential duplicate medications found. Please discuss with provider.          No discharge procedures on file.    PDMP Review         Value Time User    PDMP Reviewed  Yes 8/18/2020 11:39 AM Anthony Avila MD            ED Provider  Electronically Signed by             Isma Mejia MD  03/08/24 3483

## 2024-03-08 NOTE — CONSULTS
"e-Consult (IP[facility])  - Endocrinology  Wes Araujo 54 y.o. male MRN: [id number]  Unit/Bed#: ED-16 Encounter: [id number]          Endocrinology has been consulted to evaluate Wes Araujo    We were consulted by ED concerning this patient with hypoglycemia.  He has been seen by the endocrine service previously and consults are reviewed.  He has hx of T2DM and alcohol use d/o. He also has presented multiple times to the ED with hypoglycemia. He appears to only desire to use Lantus once daily, and both varies his dose or omits his doses. On d/c in both Jan 2024 and Feb 2024, he was instructed to take 10units glargine. However Family Med note from 2/21/24 reports using 25units daily.  No presenting with prolonged hypoglycemia being treated with dextrose both D50 and D10 infusion    Inpatient consult to Endocrinology  Consult performed by: Phyllis Cornelius MD  Consult ordered by: Angel Barbosa MD        03/08/24    Assessment/Recommendation:   54yom with known T2DM and hypoglycemia    Hypoglycemia: Likely due to glargine. Agree with D10 infusions and liberal diet while hypoglycemia continues. Titrate the D10 to stabilize Bgs. His Bgs will likely increase as the have in the past, and I anticipate that he will need to resume glargine 10units in the next 48hours as severe hyperglycemia is likely to develop.     11-20 minutes, >50% of the total time devoted to medical consultative verbal/EMR discussion between providers. Written report will be generated in the EMR.     Thank you for allowing Endocrinology to participate in the care of Wes Araujo. Please don't hesitate to call or TigerText \"SLA-endo\" role with any questions.     Phyllis Cornelius MD            "

## 2024-03-08 NOTE — ASSESSMENT & PLAN NOTE
Pt reports consumption of 6 beers daily  History of alcohol withdrawal; denies history of alcohol withdrawal seizures or hallucinations  Last drink last night, 3/7/2024    [facility] protocol

## 2024-03-08 NOTE — ED NOTES
"While attempting to take pt through triage and assessment questions with a  pt refusing to answer questions other than stating \"Im cold and I want blankets\".      Marimar Alaniz RN  03/08/24 7533    "

## 2024-03-08 NOTE — ASSESSMENT & PLAN NOTE
Due to inappropriate self-administration of Lantus 20 U.  Multiple prior episodes of hypoglycemia due to same    Pt's Diabetic home regimen consists of Trulicity and Jardiance started 2/12/24  Lantus 10 U and metformin have been discontinued as of 2/12/24    Hold home meds  Fingersticks Q1H--can decrease frequency to every 4 hours when blood glucose stabilizes and patient taking oral diet  Start Lantus 10 units at bedtime tonight if blood glucose stable and patient eating  Start sliding scale insulin with meals and at bedtime tomorrow  Hypoglycemia per protocol  Monitor blood glucose and insulin requirements and adjust regimen accordingly

## 2024-03-08 NOTE — ASSESSMENT & PLAN NOTE
Lab Results   Component Value Date    HGBA1C 15.7 (H) 01/26/2024       Recent Labs     03/08/24  1137 03/08/24  1213 03/08/24  1238 03/08/24  1404   POCGLU 77 50* 79 94       Blood Sugar Average: Last 72 hrs:  (P) [id number]    Plan as above under hypoglycemia

## 2024-03-08 NOTE — H&P
Formerly [facility]  H&P  Name: Wes Araujo 54 y.o. male I MRN: [id number]  Unit/Bed#: ICU 06 I Date of Admission: 3/8/2024   Date of Service: 3/8/2024  Hospital Day: 0      Assessment/Plan   * Hypoglycemia  Assessment & Plan  Due to inappropriate self-administration of Lantus 20 U.  Multiple prior episodes of hypoglycemia due to same    Pt's Diabetic home regimen consists of Trulicity and Jardiance started 2/12/24  Lantus 10 U and metformin have been discontinued as of 2/12/24    Hold home meds  Fingersticks Q1H--can decrease frequency to every 4 hours when blood glucose stabilizes and patient taking oral diet  Start Lantus 10 units at bedtime tonight if blood glucose stable and patient eating  Start sliding scale insulin with meals and at bedtime tomorrow  Hypoglycemia per protocol  Monitor blood glucose and insulin requirements and adjust regimen accordingly      Alcohol abuse with history of withdrawal (HCC)  Assessment & Plan  Pt reports consumption of 6 beers daily  History of alcohol withdrawal; denies history of alcohol withdrawal seizures or hallucinations  Last drink last night, 3/7/2024    [facility] protocol    Type 2 diabetes mellitus with hypoglycemia, with long-term current use of insulin (HCC)  Assessment & Plan  Lab Results   Component Value Date    HGBA1C 15.7 (H) 01/26/2024       Recent Labs     03/08/24  1137 03/08/24  1213 03/08/24  1238 03/08/24  1404   POCGLU 77 50* 79 94       Blood Sugar Average: Last 72 hrs:  (P) [id number]    Plan as above under hypoglycemia       -------------------------------------------------------------------------------------------------------------  Chief Complaint: Altered mental status    History of Present Illness   HX and PE limited by: Malay-speaking patient ([employer] provided by Valentín Nesbitt [id number]), pt is poor historian  Wes Araujo is a 54 y.o. male with past medical history of type 2 diabetes mellitus on  long-term insulin poorly controlled due to medication noncompliance and complicated by episodes of hypoglycemia and hyperglycemia, alcohol abuse with history of withdrawal, chronic pancreatitis, paroxysmal A-fib, hypertension, CKD stage III, polysubstance abuse, who presents from home via EMS for altered mental status noted by parents, with whom patient lives.  On EMS evaluation, patient reported to have blood glucose of 34.  They administered IM glucagon with rise in blood glucose to 208.  Upon arrival in the ED, patient's blood glucose found to be 81, received additional amp of dextroxe to raise BG to 216, but continued to fall to <20, requiring total of 7 amps of D50 50 ml IV and continuous dextrose infusion between 05:30 and 11:15. Given requirement for frequent BG checks and dextrose amps, pt admitted to Step Down Level 1.   Upon assessment in the ICU, pt is seen lying up in bed, alert, eating lunch, engaged with exam and in no acute distress. His only complaint at the time of this encounter is of chronic pain in the low back. He denies dizziness, lightheadedness, confusion, headache, profuse sweats, chest pain, palpitations, shortness of breath, difficulty breathing, nausea, vomiting, abdominal pain.   He seems to have confusion regarding his anti-diabetic medications, and is not taking medications as intended by his prescribing provider. He reports taking Trulicity weekly, most recently Tuesday night at midnight. He reports that, since starting Trulicity, he has decreased appetite and weight loss, which he regards as undesirable side-effects rather than intended medication effects. He reports taking Lantus 20 U last night at 21:00, despite this medication having been discontinued at his last Family Medicine visit on 2/12/24. Per note from that encounter, pt was started on Trulicity and Jardiance 2/12/24, at which time Lantus and metformin were discontinued. Lantus prescription at that time had been for 10 U  "QHS.   Pt's father reports calling EMS for AMS, states that his son \"talks nonsense\" when his blood sugar is low, but that he is now back to baseline.    History obtained from the patient and pt's father, present at bedside, using  services.  -------------------------------------------------------------------------------------------------------------  Dispo: Admit to Stepdown Level 1    Code Status: Level 1 - Full Code  --------------------------------------------------------------------------------------------------------------  Review of Systems    A 12-point, complete review of systems was reviewed and negative except as stated above     Physical Exam  Vitals and nursing note reviewed.   Constitutional:       General: He is not in acute distress.     Appearance: Normal appearance. He is normal weight. He is not ill-appearing, toxic-appearing or diaphoretic.   HENT:      Head: Normocephalic and atraumatic.   Eyes:      General: No scleral icterus.        Right eye: No discharge.         Left eye: No discharge.      Conjunctiva/sclera: Conjunctivae normal.   Cardiovascular:      Rate and Rhythm: Normal rate and regular rhythm.      Pulses: Normal pulses.      Heart sounds: Normal heart sounds. No murmur heard.     No friction rub. No gallop.   Pulmonary:      Effort: Pulmonary effort is normal. No respiratory distress.      Breath sounds: Normal breath sounds. No stridor. No wheezing, rhonchi or rales.   Chest:      Chest wall: No tenderness.   Abdominal:      General: Bowel sounds are normal. There is no distension.      Palpations: Abdomen is soft.      Tenderness: There is no abdominal tenderness. There is no guarding or rebound.   Musculoskeletal:         General: No swelling or tenderness.      Right lower leg: No edema.      Left lower leg: No edema.   Skin:     General: Skin is warm and dry.      Coloration: Skin is not jaundiced or pale.   Neurological:      General: No focal deficit present.     " " Mental Status: He is alert.   Psychiatric:         Mood and Affect: Mood normal.         Behavior: Behavior normal.       --------------------------------------------------------------------------------------------------------------  Vitals:   Vitals:    03/08/24 1236 03/08/24 1251 03/08/24 1300 03/08/24 1400   BP: 133/62  (!) 174/70 166/78   BP Location: Left arm      Patient Position:       Cuff Size:       Pulse: 90  92 102   Resp: 22  20 (!) 31   Temp: 97.7 °F (36.5 °C)      TempSrc: Oral      SpO2: 100% 100% 100% 100%   Weight:  57.6 kg (126 lb 15.8 oz)     Height:         Temp  Min: 91 °F (32.8 °C)  Max: 98.3 °F (36.8 °C)  IBW (Ideal Body Weight): 70.7 kg  Height: 5' 9\" (175.3 cm)  Body mass index is 18.75 kg/m².    Laboratory and Diagnostics:  Results from last 7 days   Lab Units 03/08/24  0522   WBC Thousand/uL 9.19   HEMOGLOBIN g/dL 12.1   HEMATOCRIT % 35.2*   PLATELETS Thousands/uL 229   NEUTROS PCT % 57   MONOS PCT % 8   EOS PCT % 2     Results from last 7 days   Lab Units 03/08/24  0522   SODIUM mmol/L 137   POTASSIUM mmol/L 3.7   CHLORIDE mmol/L 109*   CO2 mmol/L 21   ANION GAP mmol/L 7   BUN mg/dL 21   CREATININE mg/dL 1.38*   CALCIUM mg/dL 8.5   GLUCOSE RANDOM mg/dL 81   ALT U/L 72*   AST U/L 50*   ALK PHOS U/L 117*   ALBUMIN g/dL 3.7   TOTAL BILIRUBIN mg/dL 0.33          Results from last 7 days   Lab Units 03/08/24  0522   INR  0.97   PTT seconds 24              ABG:    VBG:          Micro:        EKG: NSR HR 76  Imaging:  No imaging this admission      Historical Information   Past Medical History:   Diagnosis Date    Alcohol abuse     Chronic pancreatitis (HCC)     Diabetes mellitus (HCC)     Type 2    Pancreatitis     Paroxysmal A-fib (HCC)     Thrombocytopenia (HCC)      Past Surgical History:   Procedure Laterality Date    INCISION AND DRAINAGE OF WOUND N/A 8/16/2020    Procedure: INCISION AND DRAINAGE (I&D) HEAD/FACE;  Surgeon: Estrada Walton DMD;  Location: [facility];  Service: Maxillofacial "    IR BIOPSY BONE MARROW  2/7/2019    REMOVAL OF IMPACTED TOOTH - COMPLETELY BONY N/A 8/16/2020    Procedure: EXTRACTION TEETH MULTIPLE #2, 3;  Surgeon: Estrada Walton DMD;  Location: [facility];  Service: Maxillofacial     Social History   Social History     Substance and Sexual Activity   Alcohol Use Yes     Social History     Substance and Sexual Activity   Drug Use Yes    Types: Cocaine     Social History     Tobacco Use   Smoking Status Former    Passive exposure: Past   Smokeless Tobacco Never     Family History:   Family History   Problem Relation Age of Onset    Diabetes Mother     Hypertension Mother     Hyperlipidemia Father      I have reviewed this patient's family history and commented on sigificant items within the HPI      Medications:  Current Facility-Administered Medications   Medication Dose Route Frequency    chlorhexidine (PERIDEX) 0.12 % oral rinse 15 mL  15 mL Mouth/Throat Q12H JAISON    dextrose 10 % and normal saline infusion  150 mL/hr Intravenous Continuous    heparin (porcine) subcutaneous injection 5,000 Units  5,000 Units Subcutaneous Q8H JAISON    insulin glargine (LANTUS) subcutaneous injection 10 Units 0.1 mL  10 Units Subcutaneous HS    insulin lispro (HumALOG/ADMELOG) 100 units/mL subcutaneous injection 1-5 Units  1-5 Units Subcutaneous TID AC    [START ON 3/9/2024] insulin lispro (HumALOG/ADMELOG) 100 units/mL subcutaneous injection 1-5 Units  1-5 Units Subcutaneous HS     Home medications:  Prior to Admission Medications   Prescriptions Last Dose Informant Patient Reported? Taking?   Alcohol Swabs 70 % PADS   No No   Sig: May substitute brand based on insurance coverage. Check glucose TID.   Blood Glucose Monitoring Suppl (OneTouch Verio Reflect) w/Device KIT   No No   Sig: May substitute brand based on insurance coverage. Check glucose TID.   Empagliflozin 25 MG TABS   No No   Sig: Take 1 tablet (25 mg total) by mouth daily   Insulin Pen Needle (BD Pen Needle Claudia 2nd Gen) 32G X 4 MM  "MISC   No No   Sig: For use with insulin pen. Pharmacy may dispense brand covered by insurance.   Insulin Pen Needle (BD Pen Needle Claudia 2nd Gen) 32G X 4 MM MISC   No No   Sig: For use with insulin pen. Pharmacy may dispense brand covered by insurance.   Lancets (accu-chek multiclix) lancets   No No   Sig: Check blood sugar 3 times a day   Patient not taking: Reported on 1/25/2024   Multiple Vitamin (multivitamin) tablet   No No   Sig: Take 1 tablet by mouth daily   OneTouch Delica Lancets 33G MISC   No No   Sig: May substitute brand based on insurance coverage. Check glucose TID.   dulaglutide (Trulicity) 0.75 MG/0.5ML injection   No No   Sig: Inject 0.5 mL (0.75 mg total) under the skin every 7 days   glucose blood (OneTouch Verio) test strip   No No   Sig: May substitute brand based on insurance coverage. Check glucose TID.   lisinopril (ZESTRIL) 10 mg tablet   No No   Sig: Take 1 tablet (10 mg total) by mouth daily      Facility-Administered Medications: None     Allergies:  No Known Allergies        Wisam Wilson MD        Portions of the record may have been created with voice recognition software.  Occasional wrong word or \"sound a like\" substitutions may have occurred due to the inherent limitations of voice recognition software.  Read the chart carefully and recognize, using context, where substitutions have occurred\  "

## 2024-03-08 NOTE — ED CARE HANDOFF
Emergency Department Sign Out Note        Sign out and transfer of care from Dr. Mejia. See Separate Emergency Department note.     The patient, Wes Araujo, 53 yo M with h/o alcoholism, DM2, noncompliance was evaluated by the previous provider for hypothermia, hypothermia.    Workup Completed:  Vital signs and labs and reviewed.      ED Course / Workup Pending (followup):  Hypoglycemia treated with D50, and hypthermia treated with Shannon Hugger . Patient not cooperative with exam, preferring to sleep, but purposeful, maintains positions of comfort.  Repeat glucose and vital signs monitoring, considering admission for persistent hypoglycemia or hypothermia contributing to somnolence.                                    ED Course as of 03/08/24 1305   Fri Mar 08, 2024   0758 He is on q30 for fingerstick, found to be profoundly hypoglycemic . He is still arousable and verbally responsive, but diaphoretic at bedside, c/w hypoglycemia.  I have D5LR ordered and giving additional D50, will admit.     0842 D/W Dr. Rich for admission [facility], asked that I reach out to Endocrinology   0922 POC Glucose(!!): 48  Hypoglycemic again, but it didn't drop as profound in between, so this may be making progress.  I would like to feed him but he is still under sedative effect of diazepam   1136 Blood sugar is labile again, I am having to increase the D10 and give D50 again.  He is more awake, so I have discussed with Ed Luis A to go to [facility]     CriticalCare Time    Date/Time: 3/8/2024 11:44 AM    Performed by: Angel Barbosa MD  Authorized by: Angel Barbosa MD    Critical care provider statement:     Critical care time (minutes):  60    Critical care time was exclusive of:  Separately billable procedures and treating other patients and teaching time    Critical care was necessary to treat or prevent imminent or life-threatening deterioration of the following conditions:  Endocrine crisis    Critical care was time spent  personally by me on the following activities:  Blood draw for specimens, obtaining history from patient or surrogate, development of treatment plan with patient or surrogate, discussions with consultants, evaluation of patient's response to treatment, examination of patient, interpretation of cardiac output measurements, ordering and performing treatments and interventions, ordering and review of laboratory studies, ordering and review of radiographic studies, re-evaluation of patient's condition and review of old charts    I assumed direction of critical care for this patient from another provider in my specialty: no      Medical Decision Making  Amount and/or Complexity of Data Reviewed  Labs: ordered. Decision-making details documented in ED Course.    Risk  Prescription drug management.  Decision regarding hospitalization.            Disposition  Final diagnoses:   Hypoglycemia   Hypothermia, initial encounter     Time reflects when diagnosis was documented in both MDM as applicable and the Disposition within this note       Time User Action Codes Description Comment    3/8/2024  5:51 AM Isma Mejia Add [E16.2] Hypoglycemia     3/8/2024  5:51 AM Isma Mejia Add [T68.XXXA] Hypothermia, initial encounter           ED Disposition       ED Disposition   Admit    Condition   Critical    Date/Time   Fri Mar 8, 2024  8:41 AM    Comment   Case was discussed with Dr. Rich and the patient's admission status was agreed to be Admission Status: inpatient status to the service of Dr. Rich .               Follow-up Information    None       Current Discharge Medication List        CONTINUE these medications which have NOT CHANGED    Details   Alcohol Swabs 70 % PADS May substitute brand based on insurance coverage. Check glucose TID.  Qty: 100 each, Refills: 0    Associated Diagnoses: Type 2 diabetes mellitus with hyperosmolarity without coma, without long-term current use of insulin (HCC)      Blood Glucose  Monitoring Suppl (OneTouch Verio Reflect) w/Device KIT May substitute brand based on insurance coverage. Check glucose TID.  Qty: 1 kit, Refills: 0    Associated Diagnoses: Type 2 diabetes mellitus with hyperosmolarity without coma, without long-term current use of insulin (HCC)      dulaglutide (Trulicity) 0.75 MG/0.5ML injection Inject 0.5 mL (0.75 mg total) under the skin every 7 days  Qty: 6 mL, Refills: 1    Associated Diagnoses: Type 2 diabetes mellitus with hyperglycemia, without long-term current use of insulin (HCC)      Empagliflozin 25 MG TABS Take 1 tablet (25 mg total) by mouth daily  Qty: 30 tablet, Refills: 5    Associated Diagnoses: Type 2 diabetes mellitus with hyperglycemia, without long-term current use of insulin (HCC)      glucose blood (OneTouch Verio) test strip May substitute brand based on insurance coverage. Check glucose TID.  Qty: 100 each, Refills: 0    Associated Diagnoses: Type 2 diabetes mellitus with hyperosmolarity without coma, without long-term current use of insulin (HCC)      !! Insulin Pen Needle (BD Pen Needle Claudia 2nd Gen) 32G X 4 MM MISC For use with insulin pen. Pharmacy may dispense brand covered by insurance.  Qty: 100 each, Refills: 0    Associated Diagnoses: Type 2 diabetes mellitus with hyperosmolarity without coma, without long-term current use of insulin (HCC)      !! Insulin Pen Needle (BD Pen Needle Claudia 2nd Gen) 32G X 4 MM MISC For use with insulin pen. Pharmacy may dispense brand covered by insurance.  Qty: 100 each, Refills: 0    Associated Diagnoses: Type 2 diabetes mellitus with hyperosmolarity without coma, without long-term current use of insulin (HCC)      !! Lancets (accu-chek multiclix) lancets Check blood sugar 3 times a day  Qty: 100 each, Refills: 0    Associated Diagnoses: Type 2 diabetes mellitus with hyperglycemia, with long-term current use of insulin (HCC)      lisinopril (ZESTRIL) 10 mg tablet Take 1 tablet (10 mg total) by mouth daily  Qty: 30  tablet, Refills: 0    Associated Diagnoses: Essential hypertension      Multiple Vitamin (multivitamin) tablet Take 1 tablet by mouth daily  Qty: 30 tablet, Refills: 0    Associated Diagnoses: Type 2 diabetes mellitus with hyperosmolarity without coma, without long-term current use of insulin (HCC)      !! OneTouch Delica Lancets 33G MISC May substitute brand based on insurance coverage. Check glucose TID.  Qty: 100 each, Refills: 0    Associated Diagnoses: Type 2 diabetes mellitus with hyperosmolarity without coma, without long-term current use of insulin (HCC)       !! - Potential duplicate medications found. Please discuss with provider.        No discharge procedures on file.       ED Provider  Electronically Signed by     Angel Barbosa MD  03/08/24 3932

## 2024-03-08 NOTE — ASSESSMENT & PLAN NOTE
Lab Results   Component Value Date    EGFR 57 03/08/2024    EGFR 61 02/03/2024    EGFR 74 02/02/2024    CREATININE 1.38 (H) 03/08/2024    CREATININE 1.32 (H) 02/03/2024    CREATININE 1.12 02/02/2024     Cr elevated on admission at 1.38 above historic baseline of 0.9-1.1, though stable from most recent Cr of 1.32 on 2/3/24  Cr stable Hospital Day 1 at 1.42  Monitor renal indices and avoid nephrotoxins

## 2024-03-09 LAB
ANION GAP SERPL CALCULATED.3IONS-SCNC: 4 MMOL/L
BASOPHILS # BLD AUTO: 0.02 THOUSANDS/ÂΜL (ref 0–0.1)
BASOPHILS NFR BLD AUTO: 0 % (ref 0–1)
BUN SERPL-MCNC: 16 MG/DL (ref 5–25)
C PEPTIDE SERPL-MCNC: 0.2 NG/ML (ref 1.1–4.4)
CALCIUM SERPL-MCNC: 7.5 MG/DL (ref 8.4–10.2)
CHLORIDE SERPL-SCNC: 114 MMOL/L (ref 96–108)
CO2 SERPL-SCNC: 18 MMOL/L (ref 21–32)
CREAT SERPL-MCNC: 1.42 MG/DL (ref 0.6–1.3)
EOSINOPHIL # BLD AUTO: 0.19 THOUSAND/ÂΜL (ref 0–0.61)
EOSINOPHIL NFR BLD AUTO: 2 % (ref 0–6)
ERYTHROCYTE [DISTWIDTH] IN BLOOD BY AUTOMATED COUNT: 11.9 % (ref 11.6–15.1)
GFR SERPL CREATININE-BSD FRML MDRD: 55 ML/MIN/1.73SQ M
GLUCOSE SERPL-MCNC: 134 MG/DL (ref 65–140)
GLUCOSE SERPL-MCNC: 164 MG/DL (ref 65–140)
GLUCOSE SERPL-MCNC: 173 MG/DL (ref 65–140)
GLUCOSE SERPL-MCNC: 188 MG/DL (ref 65–140)
GLUCOSE SERPL-MCNC: 192 MG/DL (ref 65–140)
GLUCOSE SERPL-MCNC: 195 MG/DL (ref 65–140)
GLUCOSE SERPL-MCNC: 214 MG/DL (ref 65–140)
GLUCOSE SERPL-MCNC: 218 MG/DL (ref 65–140)
GLUCOSE SERPL-MCNC: 221 MG/DL (ref 65–140)
GLUCOSE SERPL-MCNC: 227 MG/DL (ref 65–140)
GLUCOSE SERPL-MCNC: 234 MG/DL (ref 65–140)
GLUCOSE SERPL-MCNC: 243 MG/DL (ref 65–140)
GLUCOSE SERPL-MCNC: 29 MG/DL (ref 65–140)
GLUCOSE SERPL-MCNC: 32 MG/DL (ref 65–140)
GLUCOSE SERPL-MCNC: 63 MG/DL (ref 65–140)
GLUCOSE SERPL-MCNC: 90 MG/DL (ref 65–140)
HCT VFR BLD AUTO: 28.1 % (ref 36.5–49.3)
HGB BLD-MCNC: 9.8 G/DL (ref 12–17)
IMM GRANULOCYTES # BLD AUTO: 0.05 THOUSAND/UL (ref 0–0.2)
IMM GRANULOCYTES NFR BLD AUTO: 0 % (ref 0–2)
LYMPHOCYTES # BLD AUTO: 1.79 THOUSANDS/ÂΜL (ref 0.6–4.47)
LYMPHOCYTES NFR BLD AUTO: 15 % (ref 14–44)
MAGNESIUM SERPL-MCNC: 1.7 MG/DL (ref 1.9–2.7)
MCH RBC QN AUTO: 31.5 PG (ref 26.8–34.3)
MCHC RBC AUTO-ENTMCNC: 34.9 G/DL (ref 31.4–37.4)
MCV RBC AUTO: 90 FL (ref 82–98)
MONOCYTES # BLD AUTO: 0.94 THOUSAND/ÂΜL (ref 0.17–1.22)
MONOCYTES NFR BLD AUTO: 8 % (ref 4–12)
NEUTROPHILS # BLD AUTO: 8.66 THOUSANDS/ÂΜL (ref 1.85–7.62)
NEUTS SEG NFR BLD AUTO: 75 % (ref 43–75)
NRBC BLD AUTO-RTO: 0 /100 WBCS
PHOSPHATE SERPL-MCNC: 2.7 MG/DL (ref 2.7–4.5)
PLATELET # BLD AUTO: 181 THOUSANDS/UL (ref 149–390)
PMV BLD AUTO: 10.3 FL (ref 8.9–12.7)
POTASSIUM SERPL-SCNC: 4.4 MMOL/L (ref 3.5–5.3)
RBC # BLD AUTO: 3.11 MILLION/UL (ref 3.88–5.62)
SODIUM SERPL-SCNC: 136 MMOL/L (ref 135–147)
WBC # BLD AUTO: 11.65 THOUSAND/UL (ref 4.31–10.16)

## 2024-03-09 PROCEDURE — 84100 ASSAY OF PHOSPHORUS: CPT

## 2024-03-09 PROCEDURE — 83735 ASSAY OF MAGNESIUM: CPT

## 2024-03-09 PROCEDURE — 99232 SBSQ HOSP IP/OBS MODERATE 35: CPT | Performed by: INTERNAL MEDICINE

## 2024-03-09 PROCEDURE — 82948 REAGENT STRIP/BLOOD GLUCOSE: CPT

## 2024-03-09 PROCEDURE — 80048 BASIC METABOLIC PNL TOTAL CA: CPT

## 2024-03-09 PROCEDURE — 85025 COMPLETE CBC W/AUTO DIFF WBC: CPT

## 2024-03-09 RX ORDER — LANOLIN ALCOHOL/MO/W.PET/CERES
100 CREAM (GRAM) TOPICAL DAILY
Status: COMPLETED | OUTPATIENT
Start: 2024-03-09 | End: 2024-03-13

## 2024-03-09 RX ORDER — DEXTROSE MONOHYDRATE 25 G/50ML
12.5 INJECTION, SOLUTION INTRAVENOUS ONCE
Status: COMPLETED | OUTPATIENT
Start: 2024-03-09 | End: 2024-03-09

## 2024-03-09 RX ORDER — MAGNESIUM SULFATE HEPTAHYDRATE 40 MG/ML
2 INJECTION, SOLUTION INTRAVENOUS ONCE
Status: COMPLETED | OUTPATIENT
Start: 2024-03-09 | End: 2024-03-09

## 2024-03-09 RX ORDER — CALCIUM GLUCONATE 20 MG/ML
2 INJECTION, SOLUTION INTRAVENOUS ONCE
Status: COMPLETED | OUTPATIENT
Start: 2024-03-09 | End: 2024-03-09

## 2024-03-09 RX ORDER — LANOLIN ALCOHOL/MO/W.PET/CERES
400 CREAM (GRAM) TOPICAL DAILY
Status: COMPLETED | OUTPATIENT
Start: 2024-03-09 | End: 2024-03-13

## 2024-03-09 RX ADMIN — CHLORHEXIDINE GLUCONATE 15 ML: 1.2 RINSE ORAL at 21:50

## 2024-03-09 RX ADMIN — HEPARIN SODIUM 5000 UNITS: 5000 INJECTION INTRAVENOUS; SUBCUTANEOUS at 21:50

## 2024-03-09 RX ADMIN — THIAMINE HCL TAB 100 MG 100 MG: 100 TAB at 10:00

## 2024-03-09 RX ADMIN — DEXTROSE MONOHYDRATE 12.5 G: 25 INJECTION, SOLUTION INTRAVENOUS at 12:15

## 2024-03-09 RX ADMIN — CHLORHEXIDINE GLUCONATE 15 ML: 1.2 RINSE ORAL at 10:00

## 2024-03-09 RX ADMIN — HEPARIN SODIUM 5000 UNITS: 5000 INJECTION INTRAVENOUS; SUBCUTANEOUS at 14:59

## 2024-03-09 RX ADMIN — LISINOPRIL 10 MG: 10 TABLET ORAL at 10:00

## 2024-03-09 RX ADMIN — HEPARIN SODIUM 5000 UNITS: 5000 INJECTION INTRAVENOUS; SUBCUTANEOUS at 05:22

## 2024-03-09 RX ADMIN — CALCIUM GLUCONATE 2 G: 20 INJECTION, SOLUTION INTRAVENOUS at 05:56

## 2024-03-09 RX ADMIN — INSULIN LISPRO 2 UNITS: 100 INJECTION, SOLUTION INTRAVENOUS; SUBCUTANEOUS at 10:51

## 2024-03-09 RX ADMIN — MAGNESIUM SULFATE HEPTAHYDRATE 2 G: 40 INJECTION, SOLUTION INTRAVENOUS at 05:57

## 2024-03-09 RX ADMIN — FOLIC ACID TAB 400 MCG 400 MCG: 400 TAB at 10:00

## 2024-03-09 RX ADMIN — SODIUM CHLORIDE 150 ML/HR: 4 INJECTION, SOLUTION, CONCENTRATE INTRAVENOUS at 01:07

## 2024-03-09 NOTE — ASSESSMENT & PLAN NOTE
Lab Results   Component Value Date    HGBA1C 15.7 (H) 01/26/2024       Recent Labs     03/09/24  0232 03/09/24  0348 03/09/24  0624 03/09/24  0818   POCGLU 218* 173* 90 192*         Blood Sugar Average: Last 72 hrs:  (P) [id number]    Plan as above under hypoglycemia

## 2024-03-09 NOTE — ASSESSMENT & PLAN NOTE
Due to inappropriate self-administration of Lantus 20 U.  Multiple prior episodes of hypoglycemia due to same    Pt's Diabetic home regimen consists of Trulicity and Jardiance started 2/12/24  Lantus 10 U and metformin have been discontinued as of 2/12/24    Hold home meds  Fingersticks Q2H--can decrease frequency to every 4 hours when blood glucose stabilizes  Start Lantus 10 units at bedtime tonight if blood glucose stable  Start sliding scale insulin with meals and at bedtime once BG stable  Avoid hypoglycemia per protocol  Monitor blood glucose and insulin requirements and adjust regimen accordingly

## 2024-03-09 NOTE — CASE MANAGEMENT
Case Management Assessment & Discharge Planning Note    Patient name Wes Araujo  Location ICU 06/ICU 06 MRN [id number]  : 1970 Date 3/9/2024       Current Admission Date: 3/8/2024  Current Admission Diagnosis:Hypoglycemia   Patient Active Problem List    Diagnosis Date Noted    SIRS (systemic inflammatory response syndrome) (HCC) 2024    Alcohol abuse with history of withdrawal (HCC) 2024    Elevated brain natriuretic peptide (BNP) level 2024    Type 2 diabetes mellitus (HCC) 2024    Chronic low back pain 2023    Alcohol withdrawal (HCC) 2023    Diarrhea 2023    Seizure (HCC) 2022    Illicit drug use 2022    Leukocytosis 2022    Hypothermia 2022    Cocaine use 2022    Abnormal chest x-ray 2022    Bilateral hearing loss 2020    Hypoglycemia 2020    Hypokalemia 2020    CKD (chronic kidney disease), stage III (HCC) 2019    Anemia 2019    Hyponatremia 2019    History of atrial fibrillation 2019    Acute encephalopathy 2019    Alcohol intoxication in active alcoholic with complication (HCC) 2016    Essential hypertension     Type 2 diabetes mellitus with hypoglycemia, with long-term current use of insulin (HCC)     Uncomplicated alcohol dependence (HCC)     Paroxysmal A-fib (HCC)     Chronic pancreatitis (HCC)     Thrombocytopenia (HCC)       LOS (days): 1  Geometric Mean LOS (GMLOS) (days):   Days to GMLOS:     OBJECTIVE:    Risk of Unplanned Readmission Score: 33.25        Current admission status: Inpatient  Referral Reason: Other    Preferred Pharmacy:    [facility] - [address] [place] [id number]  Phone: [phone] Fax: [phone]    [facility] - [id number]  [facility],  [address] [place] [address],  First Floor [facility] [address]  Phone: [phone] Fax: [phone]    Primary Care  Provider: Stuart Mata DO    Primary Insurance: [employer]  Secondary Insurance:     ASSESSMENT:  Active Health Care Proxies       Wes Araujo [facility] Representative - Father   Primary Phone: [phone] (Mobile)  Home Phone: [phone]                 Readmission Root Cause  30 Day Readmission: No    Patient Information  Admitted from:: Home  Mental Status: Alert  During Assessment patient was accompanied by: Not accompanied during assessment  Assessment information provided by:: Patient, Parent  Primary Caregiver: Self  Support Systems: Self, Parent  County of Residence: [place]  What city do you live in?: [place]  Home entry access options. Select all that apply.: Stairs  Number of steps to enter home.: One Flight  Do the steps have railings?: Yes  Type of Current Residence: Apartment  Floor Level: 2  Upon entering residence, is there a bedroom on the main floor (no further steps)?: Yes  Upon entering residence, is there a bathroom on the main floor (no further steps)?: Yes  Living Arrangements: Lives w/ Parent(s)  Is patient a ?: No    Activities of Daily Living Prior to Admission  Functional Status: Independent  Completes ADLs independently?: Yes  Ambulates independently?: Yes  Does patient use assisted devices?: No  Does patient currently own DME?: No  Does patient have a history of Outpatient Therapy (PT/OT)?: No  Does the patient have a history of Short-Term Rehab?: No  Does patient have a history of HHC?: No  Does patient currently have HHC?: No    Patient Information Continued  Income Source: Unknown  Does patient have prescription coverage?: Yes  Does patient receive dialysis treatments?: No  Does patient have a history of substance abuse?: Yes  Historical substance use preference: Alcohol/ETOH  History of Withdrawal Symptoms: Denies past symptoms  Is patient currently in treatment for substance abuse?:  (offer HOST)  Does patient have a history of Mental  Health Diagnosis?: No    Means of Transportation  Means of Transport to Appts:: Family transport    Social Determinants of Health (SDOH)      Flowsheet Row Most Recent Value   Housing Stability    In the last 12 months, was there a time when you were not able to pay the mortgage or rent on time? N   In the last 12 months, how many places have you lived? 1   In the last 12 months, was there a time when you did not have a steady place to sleep or slept in a shelter (including now)? N   Transportation Needs    In the past 12 months, has lack of transportation kept you from medical appointments or from getting medications? no   In the past 12 months, has lack of transportation kept you from meetings, work, or from getting things needed for daily living? No   Food Insecurity    Within the past 12 months, you worried that your food would run out before you got the money to buy more. Never true   Within the past 12 months, the food you bought just didn't last and you didn't have money to get more. Never true   Utilities    In the past 12 months has the electric, gas, oil, or water company threatened to shut off services in your home? No            DISCHARGE DETAILS:    Discharge planning discussed with:: Father and patient  Freedom of Choice: Yes     CM contacted family/caregiver?: Yes  Were Treatment Team discharge recommendations reviewed with patient/caregiver?: Yes     Contacts  Patient Contacts: Wes Araujo  Relationship to Patient:: Family  Contact Method: In Person, Phone  Phone Number: [phone]  Reason/Outcome: Discharge Planning, Emergency Contact    Other Referral/Resources/Interventions Provided:  Interventions: Other (Specify) (Offer HOST when ready for discharge)       Additional Comments: Attempted to meet with patient and he only speaks Ethiopian.  Call to listed phone number using [employer] #[phone] and father answered phone.  Information confirmed.  Offer patient HOST referral

## 2024-03-09 NOTE — PROGRESS NOTES
[facility]  Progress Note  Name: Wes Araujo I  MRN: [id number]  Unit/Bed#: ICU 06 I Date of Admission: 3/8/2024   Date of Service: 3/9/2024 I Hospital Day: 1    Assessment/Plan   * Hypoglycemia  Assessment & Plan  Due to inappropriate self-administration of Lantus 20 U.  Multiple prior episodes of hypoglycemia due to same    Pt's Diabetic home regimen consists of Trulicity and Jardiance started 2/12/24  Lantus 10 U and metformin have been discontinued as of 2/12/24    Hold home meds  Fingersticks Q2H--can decrease frequency to every 4 hours when blood glucose stabilizes  Start Lantus 10 units at bedtime tonight if blood glucose stable  Start sliding scale insulin with meals and at bedtime once BG stable  Avoid hypoglycemia per protocol  Monitor blood glucose and insulin requirements and adjust regimen accordingly      Alcohol abuse with history of withdrawal ([facility])  Assessment & Plan  Pt reports consumption of 6 beers daily  History of alcohol withdrawal; denies history of alcohol withdrawal seizures or hallucinations  Last drink last night, 3/7/2024    [facility] protocol  Thiamine supplementation  Folate supplementation    Type 2 diabetes mellitus with hypoglycemia, with long-term current use of insulin ([facility])  Assessment & Plan  Lab Results   Component Value Date    HGBA1C 15.7 (H) 01/26/2024       Recent Labs     03/09/24  0232 03/09/24  0348 03/09/24  0624 03/09/24  0818   POCGLU 218* 173* 90 192*         Blood Sugar Average: Last 72 hrs:  (P) [id number]    Plan as above under hypoglycemia    CKD (chronic kidney disease), stage III ([facility])  Assessment & Plan  Lab Results   Component Value Date    EGFR 57 03/08/2024    EGFR 61 02/03/2024    EGFR 74 02/02/2024    CREATININE 1.38 (H) 03/08/2024    CREATININE 1.32 (H) 02/03/2024    CREATININE 1.12 02/02/2024     Cr elevated on admission at 1.38 above historic baseline of 0.9-1.1, though stable from most recent Cr of 1.32 on 2/3/24  Cr  stable Hospital Day 1 at 1.42  Monitor renal indices and avoid nephrotoxins       ----------------------------------------------------------------------------------------  HPI/24hr events: Pt remained on D10 infusion overnight, required 1 amp D50 at approx 18:00. Good appetite, eating well. Fingersticks decreased to Q2H overnight.    Patient appropriate for transfer out of the ICU today?: Patient does not meet criteria for ICU Follow-up Clinic; referral has not been made.   Disposition: Transfer to Med-Surg   Code Status: Level 1 - Full Code  ---------------------------------------------------------------------------------------  SUBJECTIVE  This morning, Mr. Araujo is seen laying up in bed, awake, alert, engaged with exam, in no acute distress. He reports feeling in his usual state of health. Only complaint at this time is chronic discomfort in his back. Denies dizziness, LH, diaphoresis, N/V. Reports good appetite, hungry for breakfast.     Review of Systems  Review of systems was reviewed and negative unless stated above in HPI/24-hour events   ---------------------------------------------------------------------------------------  OBJECTIVE    Vitals   Vitals:    24 0400 24 0600 24 0700 24 0800   BP: 131/62 152/72 153/71 150/76   BP Location:   Right arm    Pulse: 66 68 76 74   Resp: 20 15 16 20   Temp:   98.2 °F (36.8 °C)    TempSrc:   Oral    SpO2: 98% 99% 100% 99%   Weight:  59.4 kg (130 lb 15.3 oz)     Height:         Temp (24hrs), Av °F (36.7 °C), Min:96.3 °F (35.7 °C), Max:99.3 °F (37.4 °C)  Current: Temperature: 98.2 °F (36.8 °C)          Respiratory:  SpO2: SpO2: 99 %, SpO2 Activity: SpO2 Activity: At Rest, SpO2 Device: O2 Device: None (Room air), Capnography:         Invasive/non-invasive ventilation settings   Respiratory      Lab Data (Last 4 hours)      None           O2/Vent Data (Last 4 hours)      None                    Physical Exam  Vitals and nursing note reviewed.    Constitutional:       General: He is not in acute distress.     Appearance: Normal appearance. He is not ill-appearing, toxic-appearing or diaphoretic.   HENT:      Head: Normocephalic and atraumatic.   Eyes:      General: No scleral icterus.        Right eye: No discharge.         Left eye: No discharge.      Conjunctiva/sclera: Conjunctivae normal.   Cardiovascular:      Rate and Rhythm: Normal rate and regular rhythm.      Pulses: Normal pulses.      Heart sounds: Normal heart sounds. No murmur heard.     No friction rub. No gallop.   Pulmonary:      Effort: Pulmonary effort is normal. No respiratory distress.      Breath sounds: Normal breath sounds. No stridor. No wheezing, rhonchi or rales.   Chest:      Chest wall: No tenderness.   Abdominal:      General: Bowel sounds are normal. There is no distension.      Palpations: Abdomen is soft.      Tenderness: There is no abdominal tenderness. There is no guarding or rebound.   Musculoskeletal:         General: No tenderness.      Right lower leg: No edema.      Left lower leg: No edema.   Skin:     General: Skin is warm and dry.      Coloration: Skin is not jaundiced or pale.   Neurological:      Mental Status: He is alert and oriented to person, place, and time.   Psychiatric:         Mood and Affect: Mood normal.         Behavior: Behavior normal.             Laboratory and Diagnostics:  Results from last 7 days   Lab Units 03/08/24  0522   WBC Thousand/uL 9.19   HEMOGLOBIN g/dL 12.1   HEMATOCRIT % 35.2*   PLATELETS Thousands/uL 229   NEUTROS PCT % 57   MONOS PCT % 8   EOS PCT % 2     Results from last 7 days   Lab Units 03/09/24  0512 03/08/24  0522   SODIUM mmol/L 136 137   POTASSIUM mmol/L 4.4 3.7   CHLORIDE mmol/L 114* 109*   CO2 mmol/L 18* 21   ANION GAP mmol/L 4 7   BUN mg/dL 16 21   CREATININE mg/dL 1.42* 1.38*   CALCIUM mg/dL 7.5* 8.5   GLUCOSE RANDOM mg/dL 134 81   ALT U/L  --  72*   AST U/L  --  50*   ALK PHOS U/L  --  117*   ALBUMIN g/dL  --  3.7  "  TOTAL BILIRUBIN mg/dL  --  0.33     Results from last 7 days   Lab Units 03/09/24  0512   MAGNESIUM mg/dL 1.7*   PHOSPHORUS mg/dL 2.7      Results from last 7 days   Lab Units 03/08/24  0522   INR  0.97   PTT seconds 24              ABG:    VBG:          Micro        EKG: NSR in 70s on tele  Imaging:  No new imaging    Intake and Output  I/O         03/07 0701 03/08 0700 03/08 0701 03/09 0700 03/09 0701  03/10 0700    I.V. (mL/kg)  2710 (45.6)     IV Piggyback  1000     Total Intake(mL/kg)  3710 (62.5)     Urine (mL/kg/hr)  0 (0)     Stool  0     Total Output  0     Net  +3710            Unmeasured Urine Occurrence  1 x     Unmeasured Stool Occurrence  0 x             Height and Weights   Height: 5' 9\" (175.3 cm)  IBW (Ideal Body Weight): 70.7 kg  Body mass index is 19.34 kg/m².  Weight (last 2 days)       Date/Time Weight    03/09/24 0600 59.4 (130.95)    03/08/24 1251 57.6 (126.99)    03/08/24 0931 66.2 (145.94)              Nutrition       Diet Orders   (From admission, onward)                 Start     Ordered    03/08/24 1251  Diet Regular; Regular House  Diet effective now        References:    Adult Nutrition Support Algorithm    RD Therapeutic Diet Order Protocol   Question Answer Comment   Diet Type Regular    Regular Regular House    RD to adjust diet per protocol? Yes        03/08/24 1250                      Active Medications  Scheduled Meds:  Current Facility-Administered Medications   Medication Dose Route Frequency Provider Last Rate    chlorhexidine  15 mL Mouth/Throat Q12H JAISON Wilson MD      dextrose 10 % and normal saline infusion  100 mL/hr Intravenous Continuous ARIANE Goyal 100 mL/hr (03/09/24 0522)    folic acid  400 mcg Oral Daily Wisam Wilson MD      heparin (porcine)  5,000 Units Subcutaneous Q8H JAISON Wilson MD      insulin lispro  1-5 Units Subcutaneous TID AC Wisam Wilson MD      insulin lispro  1-5 Units Subcutaneous HS Wisam Wilson MD      lisinopril  " "10 mg Oral Daily Wisam Wilson MD      thiamine  100 mg Oral Daily Wisam Wilson MD       Continuous Infusions:  dextrose 10 % and normal saline infusion, 100 mL/hr, Last Rate: 100 mL/hr (03/09/24 0522)      PRN Meds:        Invasive Devices Review  Invasive Devices       Peripheral Intravenous Line  Duration             Peripheral IV 03/09/24 Proximal;Right;Ventral (anterior) Forearm <1 day                    Rationale for remaining devices: Peripheral Ivs for access while admitted  ---------------------------------------------------------------------------------------      Wisam Wilson MD      Portions of the record may have been created with voice recognition software.  Occasional wrong word or \"sound a like\" substitutions may have occurred due to the inherent limitations of voice recognition software.  Read the chart carefully and recognize, using context, where substitutions have occurred     "

## 2024-03-10 ENCOUNTER — APPOINTMENT (INPATIENT)
Dept: RADIOLOGY | Facility: HOSPITAL | Age: 54
DRG: 469 | End: 2024-03-10
Payer: COMMERCIAL

## 2024-03-10 PROBLEM — M54.50 ACUTE LOW BACK PAIN: Status: ACTIVE | Noted: 2024-03-10

## 2024-03-10 PROBLEM — M54.9 BACK PAIN: Status: ACTIVE | Noted: 2024-03-10

## 2024-03-10 LAB
ANION GAP SERPL CALCULATED.3IONS-SCNC: 3 MMOL/L
BUN SERPL-MCNC: 11 MG/DL (ref 5–25)
CALCIUM SERPL-MCNC: 7.8 MG/DL (ref 8.4–10.2)
CHLORIDE SERPL-SCNC: 110 MMOL/L (ref 96–108)
CO2 SERPL-SCNC: 21 MMOL/L (ref 21–32)
CREAT SERPL-MCNC: 1.69 MG/DL (ref 0.6–1.3)
ERYTHROCYTE [DISTWIDTH] IN BLOOD BY AUTOMATED COUNT: 11.7 % (ref 11.6–15.1)
GFR SERPL CREATININE-BSD FRML MDRD: 45 ML/MIN/1.73SQ M
GLUCOSE SERPL-MCNC: 172 MG/DL (ref 65–140)
GLUCOSE SERPL-MCNC: 194 MG/DL (ref 65–140)
GLUCOSE SERPL-MCNC: 202 MG/DL (ref 65–140)
GLUCOSE SERPL-MCNC: 223 MG/DL (ref 65–140)
GLUCOSE SERPL-MCNC: 265 MG/DL (ref 65–140)
GLUCOSE SERPL-MCNC: 370 MG/DL (ref 65–140)
HCT VFR BLD AUTO: 29.8 % (ref 36.5–49.3)
HGB BLD-MCNC: 10.4 G/DL (ref 12–17)
MCH RBC QN AUTO: 31.2 PG (ref 26.8–34.3)
MCHC RBC AUTO-ENTMCNC: 34.9 G/DL (ref 31.4–37.4)
MCV RBC AUTO: 90 FL (ref 82–98)
PLATELET # BLD AUTO: 202 THOUSANDS/UL (ref 149–390)
PMV BLD AUTO: 10.1 FL (ref 8.9–12.7)
POTASSIUM SERPL-SCNC: 4 MMOL/L (ref 3.5–5.3)
RBC # BLD AUTO: 3.33 MILLION/UL (ref 3.88–5.62)
SODIUM SERPL-SCNC: 134 MMOL/L (ref 135–147)
WBC # BLD AUTO: 9.51 THOUSAND/UL (ref 4.31–10.16)

## 2024-03-10 PROCEDURE — 82948 REAGENT STRIP/BLOOD GLUCOSE: CPT

## 2024-03-10 PROCEDURE — 99232 SBSQ HOSP IP/OBS MODERATE 35: CPT | Performed by: STUDENT IN AN ORGANIZED HEALTH CARE EDUCATION/TRAINING PROGRAM

## 2024-03-10 PROCEDURE — 80048 BASIC METABOLIC PNL TOTAL CA: CPT

## 2024-03-10 PROCEDURE — 72100 X-RAY EXAM L-S SPINE 2/3 VWS: CPT

## 2024-03-10 PROCEDURE — 85027 COMPLETE CBC AUTOMATED: CPT

## 2024-03-10 RX ORDER — AMLODIPINE BESYLATE 5 MG/1
5 TABLET ORAL DAILY
Status: DISCONTINUED | OUTPATIENT
Start: 2024-03-10 | End: 2024-03-11

## 2024-03-10 RX ORDER — ACETAMINOPHEN 325 MG/1
975 TABLET ORAL EVERY 8 HOURS SCHEDULED
Status: DISCONTINUED | OUTPATIENT
Start: 2024-03-10 | End: 2024-03-14 | Stop reason: HOSPADM

## 2024-03-10 RX ORDER — INSULIN LISPRO 100 [IU]/ML
5 INJECTION, SOLUTION INTRAVENOUS; SUBCUTANEOUS ONCE
Status: COMPLETED | OUTPATIENT
Start: 2024-03-10 | End: 2024-03-10

## 2024-03-10 RX ORDER — INSULIN GLARGINE 100 [IU]/ML
5 INJECTION, SOLUTION SUBCUTANEOUS EVERY MORNING
Status: DISCONTINUED | OUTPATIENT
Start: 2024-03-10 | End: 2024-03-14 | Stop reason: HOSPADM

## 2024-03-10 RX ORDER — INSULIN LISPRO 100 [IU]/ML
1-5 INJECTION, SOLUTION INTRAVENOUS; SUBCUTANEOUS
Status: DISCONTINUED | OUTPATIENT
Start: 2024-03-10 | End: 2024-03-14 | Stop reason: HOSPADM

## 2024-03-10 RX ORDER — METHOCARBAMOL 500 MG/1
500 TABLET, FILM COATED ORAL EVERY 6 HOURS PRN
Status: DISCONTINUED | OUTPATIENT
Start: 2024-03-10 | End: 2024-03-14 | Stop reason: HOSPADM

## 2024-03-10 RX ORDER — SODIUM CHLORIDE 9 MG/ML
75 INJECTION, SOLUTION INTRAVENOUS CONTINUOUS
Status: DISCONTINUED | OUTPATIENT
Start: 2024-03-10 | End: 2024-03-11

## 2024-03-10 RX ORDER — LIDOCAINE 50 MG/G
1 PATCH TOPICAL DAILY
Status: DISCONTINUED | OUTPATIENT
Start: 2024-03-10 | End: 2024-03-14 | Stop reason: HOSPADM

## 2024-03-10 RX ADMIN — Medication 2.5 MG: at 12:56

## 2024-03-10 RX ADMIN — FOLIC ACID TAB 400 MCG 400 MCG: 400 TAB at 08:53

## 2024-03-10 RX ADMIN — HEPARIN SODIUM 5000 UNITS: 5000 INJECTION INTRAVENOUS; SUBCUTANEOUS at 05:27

## 2024-03-10 RX ADMIN — INSULIN LISPRO 2 UNITS: 100 INJECTION, SOLUTION INTRAVENOUS; SUBCUTANEOUS at 16:40

## 2024-03-10 RX ADMIN — INSULIN LISPRO 1 UNITS: 100 INJECTION, SOLUTION INTRAVENOUS; SUBCUTANEOUS at 12:57

## 2024-03-10 RX ADMIN — AMLODIPINE BESYLATE 5 MG: 5 TABLET ORAL at 12:57

## 2024-03-10 RX ADMIN — ACETAMINOPHEN 325MG 975 MG: 325 TABLET ORAL at 13:07

## 2024-03-10 RX ADMIN — SODIUM CHLORIDE 75 ML/HR: 0.9 INJECTION, SOLUTION INTRAVENOUS at 16:40

## 2024-03-10 RX ADMIN — INSULIN LISPRO 5 UNITS: 100 INJECTION, SOLUTION INTRAVENOUS; SUBCUTANEOUS at 21:58

## 2024-03-10 RX ADMIN — THIAMINE HCL TAB 100 MG 100 MG: 100 TAB at 08:53

## 2024-03-10 RX ADMIN — CHLORHEXIDINE GLUCONATE 15 ML: 1.2 RINSE ORAL at 21:58

## 2024-03-10 RX ADMIN — ACETAMINOPHEN 325MG 975 MG: 325 TABLET ORAL at 21:58

## 2024-03-10 RX ADMIN — INSULIN GLARGINE 5 UNITS: 100 INJECTION, SOLUTION SUBCUTANEOUS at 12:57

## 2024-03-10 RX ADMIN — LISINOPRIL 10 MG: 10 TABLET ORAL at 08:53

## 2024-03-10 RX ADMIN — LIDOCAINE 5% 1 PATCH: 700 PATCH TOPICAL at 12:56

## 2024-03-10 NOTE — ASSESSMENT & PLAN NOTE
Reportedly drinks up to 6 beers daily  History of alcohol withdrawals, last drink reported 03/07  [facility] protocol, thiamine and folate supplementation

## 2024-03-10 NOTE — ASSESSMENT & PLAN NOTE
Lab Results   Component Value Date    HGBA1C 15.7 (H) 01/26/2024       Recent Labs     03/09/24  2150 03/10/24  0606 03/10/24  0624 03/10/24  1259   POCGLU 243* 172* 202* 194*     Previous A1c not well-controlled at 15.7  Previously discharged home on Lantus 10 units, empagliflozin 25mg daily, trulicity once weekly  Unclear of compliance with diabetes medication regimen  Started back on Lantus 5 units, monitor blood glucose, will consider titrating up to previous 10 units

## 2024-03-10 NOTE — ASSESSMENT & PLAN NOTE
Lab Results   Component Value Date    EGFR 45 03/10/2024    EGFR 55 03/09/2024    EGFR 57 03/08/2024    CREATININE 1.69 (H) 03/10/2024    CREATININE 1.42 (H) 03/09/2024    CREATININE 1.38 (H) 03/08/2024     Creatinine slightly elevated, will give gentle IV fluids today

## 2024-03-10 NOTE — ASSESSMENT & PLAN NOTE
Patient reporting acute lower back pain  Will order x-ray lumbar spine  Add Tylenol, Lidoderm patch, Robaxin and low-dose oxycodone as needed  PT, OT consulted

## 2024-03-10 NOTE — UTILIZATION REVIEW
"Initial Clinical Review    Admission: Date/Time/Statement:   Admission Orders (From admission, onward)       Ordered        03/08/24 1139  INPATIENT ADMISSION  Once                          Orders Placed This Encounter   Procedures    INPATIENT ADMISSION     Standing Status:   Standing     Number of Occurrences:   1     Order Specific Question:   Level of Care     Answer:   Level 1 Stepdown [13]     Order Specific Question:   Estimated length of stay     Answer:   More than 2 Midnights     Order Specific Question:   Certification     Answer:   I certify that inpatient services are medically necessary for this patient for a duration of greater than two midnights. See H&P and MD Progress Notes for additional information about the patient's course of treatment.     ED Arrival Information       Expected   -    Arrival   3/8/2024 05:08    Acuity   Emergent              Means of arrival   Ambulance    Escorted by   [facility] ([facility])    Service   Hospitalist    Admission type   Emergency              Arrival complaint   -             Chief Complaint   Patient presents with    Medical Problem     Pt arriving via ems where ems reports on arrival pt had a BS of 34, after IM glucagon ems reports . Pt diaphoretic and shaking in triage and not cooperative in answering triage questions. Pt only stating \"Im cold\"       Initial Presentation: 54 y.o. male w/ PMH of T2DM, alc abuse, chronic pancreatitis, PAF, HTN, CKD3, polysubstance abuse presented to the ED from home via EMS w/ AMS. EMS found pt w/ blood Glucose of 34, administered IM glucagon, BG improved to 208.  In the ED, blood glucose found to be 81, received additional amp of dextroxe to raise BG to 216, but continued to fall to <20, requiring total of 7 amps of D50 50 ml IV and continuous dextrose infusion between 05:30 and 11:15.   Pt seems confused w/ his anti-diabetic medications, and is not taking medications as intended by his prescribing provider. "     Admit as Inpatient for evaluation and treatment of hypoglycemia.  Plan: Hold home meds. Fingersticks Q1H--can decrease frequency to every 4 hours when blood glucose stabilizes and patient taking oral diet. Start Lantus 10 units at bedtime tonight if blood glucose stable and patient eating. Start sliding scale insulin with meals and at bedtime tomorrow. Monitor blood glucose and insulin requirements and adjust regimen accordingly     03/08 Endo Consult:Hypoglycemia: Likely due to glargine. D10 infusions and liberal diet while hypoglycemia continues. Titrate the D10 to stabilize Bgs.     Date: 03/09   Day 2:   CC Notes: No acute ovn events. Pt back to baseline. C/o chronic discomfort in his back. Reports good appetite. cont insulin sliding scale. off lantus for now, can likely restart lantus at 10units later tonight.  stop D10W. continue q2 fingersticks for ow, then switch to q4hr     ED Triage Vitals   Temperature Pulse Respirations Blood Pressure SpO2   03/08/24 0519 03/08/24 0514 03/08/24 0514 03/08/24 0514 03/08/24 0514   (!) 91 °F (32.8 °C) 70 16 165/72 100 %      Temp Source Heart Rate Source Patient Position - Orthostatic VS BP Location FiO2 (%)   03/08/24 0519 03/08/24 0514 03/08/24 0514 03/08/24 0514 --   Rectal Monitor Lying Right arm       Pain Score       03/08/24 0753       No Pain          Wt Readings from Last 1 Encounters:   03/10/24 59.3 kg (130 lb 11.7 oz)     Additional Vital Signs:   Date/Time Temp Pulse Resp BP MAP (mmHg) SpO2 O2 Device Patient Position - Orthostatic VS   03/10/24 1013 -- -- -- -- -- 98 % None (Room air) --   03/10/24 0800 -- 85 -- 181/89 Abnormal  -- -- -- --   03/10/24 07:08:39 97.9 °F (36.6 °C) 69 17 181/89 Abnormal  120 100 % None (Room air) Lying   03/10/24 06:34:13 97.8 °F (36.6 °C) 66 18 175/89 Abnormal  118 100 % -- --   03/10/24 0500 -- 60 14 132/60 96 98 % -- --   03/10/24 0300 -- -- -- 169/65 96 -- -- --   03/10/24 0044 98.6 °F (37 °C) -- -- -- -- -- -- --    03/10/24 0000 -- 68 17 170/76 118 98 % -- --   03/09/24 2200 -- 70 18 167/78 123 99 % -- --   03/09/24 2000 -- 74 18 175/80 Abnormal  124 99 % -- --   03/09/24 1956 98.5 °F (36.9 °C) -- -- -- -- -- -- --   03/09/24 1800 -- 74 20 156/76 113 99 % -- --   03/09/24 1600 -- 80 18 -- -- 98 % -- --   03/09/24 1500 98.9 °F (37.2 °C) 80 18 167/80 112 98 % None (Room air) Lying   03/09/24 1400 -- 92 22 133/69 95 99 % -- --   03/09/24 1300 -- 92 19 -- -- 99 % -- --   03/09/24 1200 -- 76 17 155/71 98 99 % -- --   03/09/24 1100 98.5 °F (36.9 °C) 72 17 163/74 111 98 % None (Room air) Lying   03/09/24 1000 -- 70 16 -- -- 94 % -- --   03/09/24 0900 -- 76 18 -- -- 99 % -- --   03/09/24 0800 -- 74 20 150/76 101 99 % -- --   03/09/24 0700 98.2 °F (36.8 °C) 76 16 153/71 108 100 % None (Room air) Lying   03/09/24 0600 -- 68 15 152/72 104 99 % -- --   03/09/24 0400 -- 66 20 131/62 82 98 % -- --   03/09/24 0308 -- 74 17 129/60 82 97 % -- --   03/09/24 0250 98.9 °F (37.2 °C) -- -- -- -- -- -- --   03/09/24 0145 -- 78 17 130/65 88 99 % -- --   03/09/24 0000 -- -- -- 145/71 -- -- -- --   03/08/24 2355 99.3 °F (37.4 °C) -- -- -- -- -- -- --   03/08/24 2023 -- 78 16 154/75 104 100 % -- --   03/08/24 1923 98.4 °F (36.9 °C) -- -- -- -- -- -- --   03/08/24 1800 -- 90 23 Abnormal  170/75 120 99 % -- --   03/08/24 1700 -- 90 21 149/89 118 99 % -- --   03/08/24 1600 -- 90 20 157/73 110 100 % -- --   03/08/24 1500 -- 98 22 158/78 107 99 % -- --   03/08/24 1400 -- 102 31 Abnormal  166/78 121 100 % -- --   03/08/24 1300 -- 92 20 174/70 Abnormal  101 100 % -- --   03/08/24 1251 -- -- -- -- -- 100 % None (Room air) --   03/08/24 1236 97.7 °F (36.5 °C) 90 22 133/62 90 100 % None (Room air) Lying   03/08/24 1200 -- 85 22 119/58 83 100 % None (Room air) Sitting   03/08/24 1139 98.3 °F (36.8 °C) -- -- -- -- -- -- --   03/08/24 1130 -- 86 16 108/56 77 -- None (Room air) --   03/08/24 1100 -- 85  14  101/56  73  99 %  None (Room air) Lying   Pulse:  Simultaneous filing. User may not have seen previous data. at 03/08/24 1100   Resp: Simultaneous filing. User may not have seen previous data. at 03/08/24 1100   BP: Simultaneous filing. User may not have seen previous data. at 03/08/24 1100   MAP (mmHg): Simultaneous filing. User may not have seen previous data. at 03/08/24 1100   SpO2: Simultaneous filing. User may not have seen previous data. at 03/08/24 1100   03/08/24 1030 97.2 °F (36.2 °C) Abnormal  82 18 106/58 80 99 % -- --   03/08/24 1000 -- 80 14 97/55 71 99 % -- --   03/08/24 0931 96.3 °F (35.7 °C) Abnormal  78 16 91/61 -- 100 % None (Room air) Lying   03/08/24 0837 -- 74 16 110/57 78 100 % None (Room air) Lying   03/08/24 0753 94.5 °F (34.7 °C) Abnormal  83 16 131/70 96 100 % None (Room air) Lying   03/08/24 0635 93.3 °F (34.1 °C) Abnormal  -- -- -- -- -- -- --   03/08/24 0630 -- 69 14 115/68 84 100 % None (Room air) Lying   03/08/24 0620 92.3 °F (33.5 °C) Abnormal  -- -- -- -- -- -- --   03/08/24 0603 92.2 °F (33.4 °C) Abnormal  -- -- -- -- -- -- --   03/08/24 0600 -- 69 15 104/57 76 100 % None (Room air) Lying   03/08/24 0548 91.9 °F (33.3 °C) Abnormal  70 14 -- -- 100 % None (Room air) --   03/08/24 0530 -- 75 14 125/73 95 100 % None (Room air) Lying   03/08/24 0519 91 °F (32.8 °C) Abnormal  -- -- -- -- -- -- --       Pertinent Labs/Diagnostic Test Results:   XR spine lumbar 2 or 3 views injury    (Results Pending)     03/08 EKG result: Normal sinus rhythm  Baseline artifact  Possible Left atrial enlargement  Nonspecific ST and T wave abnormality        Results from last 7 days   Lab Units 03/10/24  0524 03/09/24  0915 03/08/24  0522   WBC Thousand/uL 9.51 11.65* 9.19   HEMOGLOBIN g/dL 10.4* 9.8* 12.1   HEMATOCRIT % 29.8* 28.1* 35.2*   PLATELETS Thousands/uL 202 181 229   NEUTROS ABS Thousands/µL  --  8.66* 5.21         Results from last 7 days   Lab Units 03/10/24  0524 03/09/24  0512 03/08/24  0522   SODIUM mmol/L 134* 136 137   POTASSIUM mmol/L 4.0  4.4 3.7   CHLORIDE mmol/L 110* 114* 109*   CO2 mmol/L 21 18* 21   ANION GAP mmol/L 3 4 7   BUN mg/dL 11 16 21   CREATININE mg/dL 1.69* 1.42* 1.38*   EGFR ml/min/1.73sq m 45 55 57   CALCIUM mg/dL 7.8* 7.5* 8.5   MAGNESIUM mg/dL  --  1.7*  --    PHOSPHORUS mg/dL  --  2.7  --      Results from last 7 days   Lab Units 03/08/24  0522   AST U/L 50*   ALT U/L 72*   ALK PHOS U/L 117*   TOTAL PROTEIN g/dL 6.5   ALBUMIN g/dL 3.7   TOTAL BILIRUBIN mg/dL 0.33     Results from last 7 days   Lab Units 03/10/24  1259 03/10/24  0624 03/10/24  0606 03/09/24  2150 03/09/24  2003 03/09/24  1752 03/09/24  1607 03/09/24  1448 03/09/24  1351 03/09/24  1251 03/09/24  1203 03/09/24  1159   POC GLUCOSE mg/dl 194* 202* 172* 243* 214* 227* 195* 221* 188* 63* 29* 32*     Results from last 7 days   Lab Units 03/10/24  0524 03/09/24  0512 03/08/24  0522   GLUCOSE RANDOM mg/dL 223* 134 81             BETA-HYDROXYBUTYRATE   Date Value Ref Range Status   01/25/2024 0.2 <0.6 mmol/L Final   10/17/2023 0.1 <0.6 mmol/L Final   02/02/2019 0.16 0.02 - 0.27 mmol/L Final                  Results from last 7 days   Lab Units 03/08/24  0522   CK TOTAL U/L 63             Results from last 7 days   Lab Units 03/08/24  0522   PROTIME seconds 13.1   INR  0.97   PTT seconds 24             Results from last 7 days   Lab Units 03/08/24  0522   ETHANOL LVL mg/dL <10           ED Treatment:   Medication Administration from 03/08/2024 0508 to 03/08/2024 1229         Date/Time Order Dose Route Action     03/08/2024 0515 EST glucagon (FOR EMS ONLY) (GLUCAGEN) injection 1 mg 0 mg Does not apply Given to EMS     03/08/2024 0717 EST lactated ringers bolus 1,000 mL 0 mL Intravenous Stopped     03/08/2024 0523 EST lactated ringers bolus 1,000 mL 1,000 mL Intravenous New Bag     03/08/2024 0526 EST diazepam (VALIUM) injection 5 mg 5 mg Intravenous Given     03/08/2024 0528 EST dextrose 50 % IV solution 50 mL 50 mL Intravenous Given     03/08/2024 0643 EST dextrose 50 % IV  solution 50 mL 50 mL Intravenous Given     03/08/2024 0855 EST dextrose 5 % in lactated Ringer's infusion 0 mL/hr Intravenous Stopped     03/08/2024 0745 EST dextrose 5 % in lactated Ringer's infusion 250 mL/hr Intravenous New Bag     03/08/2024 0753 EST dextrose 50 % IV solution 50 mL 50 mL Intravenous Given     03/08/2024 0834 EST dextrose 50 % IV solution 50 mL 50 mL Intravenous Given     03/08/2024 1115 EST dextrose 10 % and normal saline infusion 150 mL/hr Intravenous Rate/Dose Change     03/08/2024 0856 EST dextrose 10 % and normal saline infusion 100 mL/hr Intravenous New Bag     03/08/2024 0927 EST dextrose 50 % IV solution 50 mL 50 mL Intravenous Given     03/08/2024 1017 EST dextrose 50 % IV solution 50 mL 50 mL Intravenous Given     03/08/2024 1112 EST dextrose 50 % IV solution 50 mL 50 mL Intravenous Given          Past Medical History:   Diagnosis Date    Alcohol abuse     Chronic pancreatitis (HCC)     Diabetes mellitus (HCC)     Type 2    Pancreatitis     Paroxysmal A-fib (HCC)     Thrombocytopenia (HCC)      Present on Admission:   Hypoglycemia   Alcohol abuse with history of withdrawal (HCC)   CKD (chronic kidney disease), stage III (HCC)      Admitting Diagnosis: Hypoglycemia [E16.2]  Hypothermia, initial encounter [T68.XXXA]  History of medical problems [Z87.898]  Age/Sex: 54 y.o. male  Admission Orders:  SCD  Continuous Pulse ox  Neuro checks  Nsg Dysphagia screen  [facility]  Scheduled Medications:  acetaminophen, 975 mg, Oral, Q8H JAISON  amLODIPine, 5 mg, Oral, Daily  chlorhexidine, 15 mL, Mouth/Throat, Q12H JAISON  folic acid, 400 mcg, Oral, Daily  heparin (porcine), 5,000 Units, Subcutaneous, Q8H JAISON  insulin glargine, 5 Units, Subcutaneous, QAM  insulin lispro, 1-5 Units, Subcutaneous, TID AC  lidocaine, 1 patch, Topical, Daily  lisinopril, 10 mg, Oral, Daily  thiamine, 100 mg, Oral, Daily      Continuous IV Infusions:  sodium chloride, 75 mL/hr, Intravenous, Continuous  dextrose 10 % and normal saline  infusion  Rate: 100 mL/hr Dose: 100 mL/hr  Freq: Continuous Route: IV  Last Dose: 100 mL/hr (03/09/24 0522)  Start: 03/08/24 0845 End: 03/09/24 1010     PRN Meds:  methocarbamol, 500 mg, Oral, Q6H PRN  oxyCODONE, 2.5 mg, Oral, Q6H PRN         IP CONSULT TO ENDOCRINOLOGY  IP CONSULT TO CASE MANAGEMENT    Network Utilization Review Department  ATTENTION: Please call with any questions or concerns to [phone] and carefully listen to the prompts so that you are directed to the right person. All voicemails are confidential.   For Discharge needs, contact Care Management DC Support Team at [phone] opt. [phone]  Send all requests for admission clinical reviews, approved or denied determinations and any other requests to dedicated fax number below belonging to the campus where the patient is receiving treatment. List of dedicated fax numbers for the Facilities:  FACILITY NAME UR FAX NUMBER   ADMISSION DENIALS (Administrative/Medical Necessity) [phone]   DISCHARGE SUPPORT TEAM (NETWORK) [phone]   PARENT CHILD HEALTH (Maternity/NICU/Pediatrics) [phone]   [facility] [phone]   [facility] [phone]   [facility] [phone]   [facility] [phone]   [facility] [phone]   [facility] [phone]   [facility] [phone]   [facility] [phone]   [facility] [phone]   Critical access hospital [phone]   [facility] [phone]   [facility] [phone]

## 2024-03-10 NOTE — NURSING NOTE
Patient transfer to [place] [address]. Report given to [facility] JACKIE COVARRUBIAS. Patient transported to [facility] with all belongings.

## 2024-03-10 NOTE — PROGRESS NOTES
Pending sale to [facility]  Progress Note  Name: Wes Araujo I  MRN: [id number]  Unit/Bed#: E5 -01 I Date of Admission: 3/8/2024   Date of Service: 3/10/2024 I Hospital Day: 2    Assessment/Plan   * Hypoglycemia  Assessment & Plan  Admitted to the ICU, reportedly self administered an appropriate dose of Lantus insulin 20 units  Had multiple prior hospital admissions for hypoglycemia  Treated with D5, with subsequent blood glucose checks improving  Start Lantus at decreased dose of 5 units in a.m. and continue sliding scale and Accu-Cheks  Monitor blood glucose and hopeful for discharge home tomorrow    Acute low back pain  Assessment & Plan  Patient reporting acute lower back pain  Will order x-ray lumbar spine  Add Tylenol, Lidoderm patch, Robaxin and low-dose oxycodone as needed  PT, OT consulted    Alcohol abuse with history of withdrawal ([facility])  Assessment & Plan  Reportedly drinks up to 6 beers daily  History of alcohol withdrawals, last drink reported 03/07  CIWA protocol, thiamine and folate supplementation    CKD (chronic kidney disease), stage III ([facility])  Assessment & Plan  Lab Results   Component Value Date    EGFR 45 03/10/2024    EGFR 55 03/09/2024    EGFR 57 03/08/2024    CREATININE 1.69 (H) 03/10/2024    CREATININE 1.42 (H) 03/09/2024    CREATININE 1.38 (H) 03/08/2024     Creatinine slightly elevated, will give gentle IV fluids today    Type 2 diabetes mellitus with hypoglycemia, with long-term current use of insulin ([facility])  Assessment & Plan  Lab Results   Component Value Date    HGBA1C 15.7 (H) 01/26/2024       Recent Labs     03/09/24  2150 03/10/24  0606 03/10/24  0624 03/10/24  1259   POCGLU 243* 172* 202* 194*     Previous A1c not well-controlled at 15.7  Previously discharged home on Lantus 10 units, empagliflozin 25mg daily, trulicity once weekly  Unclear of compliance with diabetes medication regimen  Started back on Lantus 5 units, monitor blood glucose, will consider  titrating up to previous 10 units    Essential hypertension  Assessment & Plan  Not well controlled  Continue lisinopril 10mg daily  Add norvasc 5mg daily             VTE Pharmacologic Prophylaxis:   Pharmacologic: Heparin  Mechanical VTE Prophylaxis in Place: Yes    Current Length of Stay: 2 day(s)    Current Patient Status: Inpatient   Certification Statement: The patient will continue to require additional inpatient hospital stay due to monitor BG, low back pain, PT/OT eval    Discharge Plan: pending    Code Status: Level 1 - Full Code      Subjective:   Patient reports doing well overall today.  Did have some back pain.  Denies any chest pain or shortness of breath.  Has been tolerating diet.    Objective:     Vitals:   Temp (24hrs), Av.3 °F (36.8 °C), Min:97.8 °F (36.6 °C), Max:98.9 °F (37.2 °C)    Temp:  [97.8 °F (36.6 °C)-98.9 °F (37.2 °C)] 98.9 °F (37.2 °C)  HR:  [60-86] 86  Resp:  [14-20] 20  BP: (122-181)/(60-89) 122/68  SpO2:  [98 %-100 %] 98 %  Body mass index is 19.31 kg/m².     Input and Output Summary (last 24 hours):       Intake/Output Summary (Last 24 hours) at 3/10/2024 1551  Last data filed at 3/10/2024 0828  Gross per 24 hour   Intake 300 ml   Output --   Net 300 ml       Physical Exam:     Physical Exam  Vitals and nursing note reviewed.   HENT:      Head: Normocephalic.   Eyes:      Conjunctiva/sclera: Conjunctivae normal.   Cardiovascular:      Rate and Rhythm: Normal rate.   Pulmonary:      Effort: Pulmonary effort is normal. No respiratory distress.   Abdominal:      General: Bowel sounds are normal. There is no distension.      Palpations: Abdomen is soft.   Musculoskeletal:      Right lower leg: No edema.      Left lower leg: No edema.   Skin:     General: Skin is warm.   Neurological:      General: No focal deficit present.      Mental Status: He is alert. Mental status is at baseline.         Additional Data:     Labs:    Results from last 7 days   Lab Units 03/10/24  0560  03/09/24  0915   WBC Thousand/uL 9.51 11.65*   HEMOGLOBIN g/dL 10.4* 9.8*   HEMATOCRIT % 29.8* 28.1*   PLATELETS Thousands/uL 202 181   NEUTROS PCT %  --  75   LYMPHS PCT %  --  15   MONOS PCT %  --  8   EOS PCT %  --  2     Results from last 7 days   Lab Units 03/10/24  0524 03/09/24  0512 03/08/24  0522   SODIUM mmol/L 134*   < > 137   POTASSIUM mmol/L 4.0   < > 3.7   CHLORIDE mmol/L 110*   < > 109*   CO2 mmol/L 21   < > 21   BUN mg/dL 11   < > 21   CREATININE mg/dL 1.69*   < > 1.38*   ANION GAP mmol/L 3   < > 7   CALCIUM mg/dL 7.8*   < > 8.5   ALBUMIN g/dL  --   --  3.7   TOTAL BILIRUBIN mg/dL  --   --  0.33   ALK PHOS U/L  --   --  117*   ALT U/L  --   --  72*   AST U/L  --   --  50*   GLUCOSE RANDOM mg/dL 223*   < > 81    < > = values in this interval not displayed.     Results from last 7 days   Lab Units 03/08/24  0522   INR  0.97     Results from last 7 days   Lab Units 03/10/24  1259 03/10/24  0624 03/10/24  0606 03/09/24  2150 03/09/24  2003 03/09/24  1752 03/09/24  1607 03/09/24  1448 03/09/24  1351 03/09/24  1251 03/09/24  1203 03/09/24  1159   POC GLUCOSE mg/dl 194* 202* 172* 243* 214* 227* 195* 221* 188* 63* 29* 32*                   * I Have Reviewed All Lab Data Listed Above.  * Additional Pertinent Lab Tests Reviewed: All Labs For Current Hospital Admission Reviewed    Mobility:  Basic Mobility Inpatient Raw Score: 19  -[facility] Goal: 6: Walk 10 steps or more  -[facility] Achieved: 7: Walk 25 feet or more    Lines:     Invasive Devices       Peripheral Intravenous Line  Duration             Peripheral IV 03/09/24 Proximal;Right;Ventral (anterior) Forearm 1 day    Peripheral IV 03/09/24 Distal;Dorsal (posterior);Left Forearm <1 day                       Imaging:    Imaging Reports Reviewed Today Include:     No results found.      Recent Cultures (last 7 days):           Last 24 Hours Medication List:   Current Facility-Administered Medications   Medication Dose Route Frequency Provider Last Rate     acetaminophen  975 mg Oral Q8H Replaced by [facility] Phong Sy MD      amLODIPine  5 mg Oral Daily Phong Sy MD      chlorhexidine  15 mL Mouth/Throat Q12H Replaced by [facility] ARIANE Quinones      folic acid  400 mcg Oral Daily ARIANE Quinones      heparin (porcine)  5,000 Units Subcutaneous Q8H Replaced by [facility] ARIANE Quinones      insulin glargine  5 Units Subcutaneous QAM Phong Sy MD      insulin lispro  1-5 Units Subcutaneous TID AC Phong Sy MD      lidocaine  1 patch Topical Daily Phong Sy MD      lisinopril  10 mg Oral Daily ARIANE Quinones      methocarbamol  500 mg Oral Q6H PRN Phong Sy MD      oxyCODONE  2.5 mg Oral Q6H PRN Phong Sy MD      sodium chloride  75 mL/hr Intravenous Continuous Phong Sy MD      thiamine  100 mg Oral Daily ARIANE Quinones          Today, Patient Was Seen By: Phong Sy MD    ** Please Note: Dictation voice to text software may have been used in the creation of this document. **

## 2024-03-10 NOTE — PLAN OF CARE

## 2024-03-10 NOTE — PLAN OF CARE
Problem: Potential for Falls  Goal: Patient will remain free of falls  Description: INTERVENTIONS:  - Educate patient/family on patient safety including physical limitations  - Instruct patient to call for assistance with activity   - Consult OT/PT to assist with strengthening/mobility   - Keep Call bell within reach  - Keep bed low and locked with side rails adjusted as appropriate  - Keep care items and personal belongings within reach  - Initiate and maintain comfort rounds  - Make Fall Risk Sign visible to staff  Problem: SAFETY ADULT  Goal: Patient will remain free of falls  Description: INTERVENTIONS:  - Educate patient/family on patient safety including physical limitations  - Instruct patient to call for assistance with activity   - Consult OT/PT to assist with strengthening/mobility   - Keep Call bell within reach  - Keep bed low and locked with side rails adjusted as appropriate  - Keep care items and personal belongings within reach  - Initiate and maintain comfort rounds  - Make Fall Risk Sign visible to staff  Problem: Knowledge Deficit  Goal: Patient/family/caregiver demonstrates understanding of disease process, treatment plan, medications, and discharge instructions  Description: Complete learning assessment and assess knowledge base.  Interventions:  - Provide teaching at level of understanding  - Provide teaching via preferred learning methods  Outcome: Progressing     - Apply yellow socks and bracelet for high fall risk patients  - Consider moving patient to room near nurses station  Outcome: Progressing     Problem: DISCHARGE PLANNING  Goal: Discharge to home or other facility with appropriate resources  Description: INTERVENTIONS:  - Identify barriers to discharge w/patient and caregiver  - Arrange for needed discharge resources and transportation as appropriate  - Identify discharge learning needs (meds, wound care, etc.)  - Arrange for interpretive services to assist at discharge as  needed  - Refer to Case Management Department for coordinating discharge planning if the patient needs post-hospital services based on physician/advanced practitioner order or complex needs related to functional status, cognitive ability, or social support system  Outcome: Progressing     - Apply yellow socks and bracelet for high fall risk patients  - Consider moving patient to room near nurses station  Outcome: Progressing     Problem: PAIN - ADULT  Goal: Verbalizes/displays adequate comfort level or baseline comfort level  Description: Interventions:  - Encourage patient to monitor pain and request assistance  - Assess pain using appropriate pain scale  - Administer analgesics based on type and severity of pain and evaluate response  - Implement non-pharmacological measures as appropriate and evaluate response  - Consider cultural and social influences on pain and pain management  - Notify physician/advanced practitioner if interventions unsuccessful or patient reports new pain  Outcome: Progressing

## 2024-03-10 NOTE — ASSESSMENT & PLAN NOTE
Admitted to the ICU, reportedly self administered an appropriate dose of Lantus insulin 20 units  Had multiple prior hospital admissions for hypoglycemia  Treated with D5, with subsequent blood glucose checks improving  Start Lantus at decreased dose of 5 units in a.m. and continue sliding scale and Accu-Cheks  Monitor blood glucose and hopeful for discharge home tomorrow

## 2024-03-11 PROBLEM — R74.01 TRANSAMINITIS: Status: ACTIVE | Noted: 2024-03-11

## 2024-03-11 PROBLEM — K90.9 CHRONIC STEATORRHEA: Status: ACTIVE | Noted: 2024-03-11

## 2024-03-11 PROBLEM — N17.9 ACUTE RENAL FAILURE SUPERIMPOSED ON STAGE 3 CHRONIC KIDNEY DISEASE (HCC): Status: ACTIVE | Noted: 2019-02-26

## 2024-03-11 LAB
25(OH)D3 SERPL-MCNC: <7 NG/ML (ref 30–100)
ANION GAP SERPL CALCULATED.3IONS-SCNC: 4 MMOL/L
BACTERIA UR QL AUTO: ABNORMAL /HPF
BASOPHILS # BLD AUTO: 0.03 THOUSANDS/ÂΜL (ref 0–0.1)
BASOPHILS NFR BLD AUTO: 0 % (ref 0–1)
BILIRUB UR QL STRIP: NEGATIVE
BUN SERPL-MCNC: 16 MG/DL (ref 5–25)
CALCIUM SERPL-MCNC: 7.6 MG/DL (ref 8.4–10.2)
CHLORIDE SERPL-SCNC: 113 MMOL/L (ref 96–108)
CLARITY UR: CLEAR
CO2 SERPL-SCNC: 19 MMOL/L (ref 21–32)
COLOR UR: COLORLESS
CREAT SERPL-MCNC: 1.81 MG/DL (ref 0.6–1.3)
EOSINOPHIL # BLD AUTO: 0.31 THOUSAND/ÂΜL (ref 0–0.61)
EOSINOPHIL NFR BLD AUTO: 3 % (ref 0–6)
ERYTHROCYTE [DISTWIDTH] IN BLOOD BY AUTOMATED COUNT: 11.8 % (ref 11.6–15.1)
GFR SERPL CREATININE-BSD FRML MDRD: 41 ML/MIN/1.73SQ M
GLUCOSE SERPL-MCNC: 102 MG/DL (ref 65–140)
GLUCOSE SERPL-MCNC: 147 MG/DL (ref 65–140)
GLUCOSE SERPL-MCNC: 166 MG/DL (ref 65–140)
GLUCOSE SERPL-MCNC: 236 MG/DL (ref 65–140)
GLUCOSE SERPL-MCNC: 268 MG/DL (ref 65–140)
GLUCOSE UR STRIP-MCNC: ABNORMAL MG/DL
HCT VFR BLD AUTO: 29.2 % (ref 36.5–49.3)
HGB BLD-MCNC: 10 G/DL (ref 12–17)
HGB UR QL STRIP.AUTO: NEGATIVE
HYALINE CASTS #/AREA URNS LPF: ABNORMAL /LPF
IMM GRANULOCYTES # BLD AUTO: 0.03 THOUSAND/UL (ref 0–0.2)
IMM GRANULOCYTES NFR BLD AUTO: 0 % (ref 0–2)
KETONES UR STRIP-MCNC: NEGATIVE MG/DL
LEUKOCYTE ESTERASE UR QL STRIP: NEGATIVE
LYMPHOCYTES # BLD AUTO: 2.76 THOUSANDS/ÂΜL (ref 0.6–4.47)
LYMPHOCYTES NFR BLD AUTO: 28 % (ref 14–44)
MAGNESIUM SERPL-MCNC: 1.6 MG/DL (ref 1.9–2.7)
MCH RBC QN AUTO: 31.1 PG (ref 26.8–34.3)
MCHC RBC AUTO-ENTMCNC: 34.2 G/DL (ref 31.4–37.4)
MCV RBC AUTO: 91 FL (ref 82–98)
MONOCYTES # BLD AUTO: 0.82 THOUSAND/ÂΜL (ref 0.17–1.22)
MONOCYTES NFR BLD AUTO: 8 % (ref 4–12)
NEUTROPHILS # BLD AUTO: 6.05 THOUSANDS/ÂΜL (ref 1.85–7.62)
NEUTS SEG NFR BLD AUTO: 61 % (ref 43–75)
NITRITE UR QL STRIP: NEGATIVE
NON-SQ EPI CELLS URNS QL MICRO: ABNORMAL /HPF
NRBC BLD AUTO-RTO: 0 /100 WBCS
PH UR STRIP.AUTO: 6 [PH]
PLATELET # BLD AUTO: 218 THOUSANDS/UL (ref 149–390)
PMV BLD AUTO: 10.5 FL (ref 8.9–12.7)
POTASSIUM SERPL-SCNC: 4.1 MMOL/L (ref 3.5–5.3)
PROT UR STRIP-MCNC: ABNORMAL MG/DL
PTH-INTACT SERPL-MCNC: 145.8 PG/ML (ref 12–88)
RBC # BLD AUTO: 3.22 MILLION/UL (ref 3.88–5.62)
RBC #/AREA URNS AUTO: ABNORMAL /HPF
SODIUM SERPL-SCNC: 136 MMOL/L (ref 135–147)
SP GR UR STRIP.AUTO: 1.01 (ref 1–1.03)
UROBILINOGEN UR STRIP-ACNC: <2 MG/DL
WBC # BLD AUTO: 10 THOUSAND/UL (ref 4.31–10.16)
WBC #/AREA URNS AUTO: ABNORMAL /HPF

## 2024-03-11 PROCEDURE — 82306 VITAMIN D 25 HYDROXY: CPT | Performed by: NURSE PRACTITIONER

## 2024-03-11 PROCEDURE — 81001 URINALYSIS AUTO W/SCOPE: CPT | Performed by: NURSE PRACTITIONER

## 2024-03-11 PROCEDURE — 80048 BASIC METABOLIC PNL TOTAL CA: CPT | Performed by: STUDENT IN AN ORGANIZED HEALTH CARE EDUCATION/TRAINING PROGRAM

## 2024-03-11 PROCEDURE — 99255 IP/OBS CONSLTJ NEW/EST HI 80: CPT | Performed by: INTERNAL MEDICINE

## 2024-03-11 PROCEDURE — 97166 OT EVAL MOD COMPLEX 45 MIN: CPT

## 2024-03-11 PROCEDURE — 82948 REAGENT STRIP/BLOOD GLUCOSE: CPT

## 2024-03-11 PROCEDURE — 83970 ASSAY OF PARATHORMONE: CPT | Performed by: NURSE PRACTITIONER

## 2024-03-11 PROCEDURE — 99233 SBSQ HOSP IP/OBS HIGH 50: CPT | Performed by: STUDENT IN AN ORGANIZED HEALTH CARE EDUCATION/TRAINING PROGRAM

## 2024-03-11 PROCEDURE — 83735 ASSAY OF MAGNESIUM: CPT | Performed by: STUDENT IN AN ORGANIZED HEALTH CARE EDUCATION/TRAINING PROGRAM

## 2024-03-11 PROCEDURE — 85025 COMPLETE CBC W/AUTO DIFF WBC: CPT | Performed by: STUDENT IN AN ORGANIZED HEALTH CARE EDUCATION/TRAINING PROGRAM

## 2024-03-11 RX ORDER — AMLODIPINE BESYLATE 5 MG/1
5 TABLET ORAL 2 TIMES DAILY
Status: DISCONTINUED | OUTPATIENT
Start: 2024-03-11 | End: 2024-03-14 | Stop reason: HOSPADM

## 2024-03-11 RX ORDER — MAGNESIUM SULFATE HEPTAHYDRATE 40 MG/ML
2 INJECTION, SOLUTION INTRAVENOUS ONCE
Status: COMPLETED | OUTPATIENT
Start: 2024-03-11 | End: 2024-03-11

## 2024-03-11 RX ADMIN — INSULIN GLARGINE 5 UNITS: 100 INJECTION, SOLUTION SUBCUTANEOUS at 08:32

## 2024-03-11 RX ADMIN — ACETAMINOPHEN 325MG 975 MG: 325 TABLET ORAL at 06:07

## 2024-03-11 RX ADMIN — SODIUM BICARBONATE 100 ML/HR: 84 INJECTION, SOLUTION INTRAVENOUS at 11:35

## 2024-03-11 RX ADMIN — Medication 2.5 MG: at 21:17

## 2024-03-11 RX ADMIN — METHOCARBAMOL 500 MG: 500 TABLET ORAL at 21:17

## 2024-03-11 RX ADMIN — THIAMINE HCL TAB 100 MG 100 MG: 100 TAB at 08:32

## 2024-03-11 RX ADMIN — AMLODIPINE BESYLATE 5 MG: 5 TABLET ORAL at 17:02

## 2024-03-11 RX ADMIN — CHLORHEXIDINE GLUCONATE 15 ML: 1.2 RINSE ORAL at 21:17

## 2024-03-11 RX ADMIN — FOLIC ACID TAB 400 MCG 400 MCG: 400 TAB at 08:32

## 2024-03-11 RX ADMIN — INSULIN LISPRO 1 UNITS: 100 INJECTION, SOLUTION INTRAVENOUS; SUBCUTANEOUS at 08:33

## 2024-03-11 RX ADMIN — MAGNESIUM SULFATE HEPTAHYDRATE 2 G: 40 INJECTION, SOLUTION INTRAVENOUS at 08:38

## 2024-03-11 RX ADMIN — INSULIN LISPRO 2 UNITS: 100 INJECTION, SOLUTION INTRAVENOUS; SUBCUTANEOUS at 17:02

## 2024-03-11 RX ADMIN — HEPARIN SODIUM 5000 UNITS: 5000 INJECTION INTRAVENOUS; SUBCUTANEOUS at 21:17

## 2024-03-11 RX ADMIN — AMLODIPINE BESYLATE 5 MG: 5 TABLET ORAL at 08:32

## 2024-03-11 RX ADMIN — LIDOCAINE 5% 1 PATCH: 700 PATCH TOPICAL at 08:32

## 2024-03-11 RX ADMIN — ACETAMINOPHEN 325MG 975 MG: 325 TABLET ORAL at 21:17

## 2024-03-11 RX ADMIN — LISINOPRIL 10 MG: 10 TABLET ORAL at 08:32

## 2024-03-11 RX ADMIN — HEPARIN SODIUM 5000 UNITS: 5000 INJECTION INTRAVENOUS; SUBCUTANEOUS at 13:12

## 2024-03-11 RX ADMIN — ACETAMINOPHEN 325MG 975 MG: 325 TABLET ORAL at 13:12

## 2024-03-11 NOTE — PLAN OF CARE

## 2024-03-11 NOTE — ASSESSMENT & PLAN NOTE
Patient reporting lower back pain and states this has been a chronic issue for him and that it has been going on for years  Xray lumbar spine showing no osseous abnormality  Continue Tylenol, Lidoderm patch, Robaxin and low-dose oxycodone as needed  PT, OT evaluation complete, patient with no needs

## 2024-03-11 NOTE — CONSULTS
Consultation - Nephrology   Wes Araujo 54 y.o. male MRN: [id number]  Unit/Bed#: E5 -01 Encounter: [id number]    ASSESSMENT/PLAN:  Acute kidney injury on suspected CKD stage II/IIIA: Prerenal related to diarrhea/volume depletion as well as ACE inhibition.  -ZENAIDA in February with peak creatinine of 7.4 and discharge creatinine of 2.4.  -Baseline creatinine 0.8-1.1.  -Currently with rise to 1.81.  -Presented with a creatinine of 1.38.  -Recommend holding ACE inhibitor.  -Check urinalysis.  -Check bladder scan PVR.  -Will give 1 L of IV fluids.  -Prior renal imaging unremarkable.  Consider checking renal ultrasound if creatinine continues to increase.  -Continue to avoid nephrotoxins, hypotension, IV contrast.  -I/O.    Hypomagnesemia: Status post 2 g IV mag x 1.  Suspect related to diarrheal stools.  -Repeat mag level in AM.    Nongap acidosis: Suspect related to increased GI loss with diarrhea versus increasing creatinine.  -Will give 1 L of IV bicarbonate.    Hypocalcemia:   -check PTH and vitamin D.    -receiving Mag replacement.    Hypertension: Blood pressure is above goal for age.  -Home medication: Lisinopril 10 mg daily.  -Current medication: Amlodipine 5 mg daily, lisinopril 10 mg daily.  -Recommendation: Will discontinue lisinopril.  Increase amlodipine to 5 mg twice daily.  -Holding parameters adjusted for systolic blood pressure less than 130.  -Recommend avoiding hypotension or high fluctuations in blood pressure.    Anemia:  -Prior SPEP/UPEP negative.  -Previous iron panel with normal iron and normal iron sat.  -Continue to monitor and transfuse as needed for hemoglobin less than 7.0.    Other: Diabetes, alcohol abuse, chronic pancreatitis, atrial fibrillation      HISTORY OF PRESENT ILLNESS:  Requesting Physician: Fannie Garcia *  Reason for Consult: ZENAIDA on CKD II/IIIA    Wes Araujo is a 54 y.o. year old [place]-speaking male with past medical history of hypertension, poorly  controlled diabetes, alcohol abuse, medical non-compliance, who was admitted to [facility] after presenting via EMS with altered mental status noted by parent per record review.  When EMS arrived, patient was noted to be hypoglycemic.  He required ICU stay earlier this admission due to continued hypoglycemia.  A renal consultation is requested today for assistance in the management of ZENAIDA on CKD.  The patient reports that his parents help care for him at home.  He states he was laying in his bed and remembers falling asleep.  Next thing he remembers is waking up in the hospital with doctors at the bedside.  He does not recall having any fevers or chills.  He denies any chest pain or shortness of breath.  He reports having continued loose/diarrheal stools.  Sometimes up to 10 stools per day.  He denies any issues with urination.  He reports having decreased appetite.  He denies any vomiting.  He denies any NSAID use.    PAST MEDICAL HISTORY:  Past Medical History:   Diagnosis Date    Alcohol abuse     Chronic pancreatitis (HCC)     Diabetes mellitus (HCC)     Type 2    Pancreatitis     Paroxysmal A-fib (HCC)     Thrombocytopenia (HCC)        PAST SURGICAL HISTORY:  Past Surgical History:   Procedure Laterality Date    INCISION AND DRAINAGE OF WOUND N/A 8/16/2020    Procedure: INCISION AND DRAINAGE (I&D) HEAD/FACE;  Surgeon: Estrada Walton DMD;  Location: [facility];  Service: Maxillofacial    IR BIOPSY BONE MARROW  2/7/2019    REMOVAL OF IMPACTED TOOTH - COMPLETELY BONY N/A 8/16/2020    Procedure: EXTRACTION TEETH MULTIPLE #2, 3;  Surgeon: Estrada Walton DMD;  Location: [facility];  Service: Maxillofacial       ALLERGIES:  No Known Allergies    SOCIAL HISTORY:  Social History     Substance and Sexual Activity   Alcohol Use Yes     Social History     Substance and Sexual Activity   Drug Use Yes    Types: Cocaine     Social History     Tobacco Use   Smoking Status Former    Passive exposure: Past   Smokeless Tobacco Never        FAMILY HISTORY:  Family History   Problem Relation Age of Onset    Diabetes Mother     Hypertension Mother     Hyperlipidemia Father        MEDICATIONS:    Current Facility-Administered Medications:     acetaminophen (TYLENOL) tablet 975 mg, 975 mg, Oral, Q8H [facility], Phong Sy MD, 975 mg at 03/11/24 0607    amLODIPine (NORVASC) tablet 5 mg, 5 mg, Oral, Daily, Phong Sy MD, 5 mg at 03/11/24 0832    chlorhexidine (PERIDEX) 0.12 % oral rinse 15 mL, 15 mL, Mouth/Throat, Q12H JAISON, ARIANE Quinones, 15 mL at 03/10/24 2158    folic acid (FOLVITE) tablet 400 mcg, 400 mcg, Oral, Daily, ARIANE Quinones, 400 mcg at 03/11/24 0832    heparin (porcine) subcutaneous injection 5,000 Units, 5,000 Units, Subcutaneous, Q8H JAISON, ARIANE Quinones, 5,000 Units at 03/10/24 0527    insulin glargine (LANTUS) subcutaneous injection 5 Units 0.05 mL, 5 Units, Subcutaneous, QAM, Phong Sy MD, 5 Units at 03/11/24 0832    insulin lispro (HumALOG/ADMELOG) 100 units/mL subcutaneous injection 1-5 Units, 1-5 Units, Subcutaneous, TID AC, 1 Units at 03/11/24 0833 **AND** Fingerstick Glucose (POCT), , , TID AC, Phong Sy MD    lidocaine (LIDODERM) 5 % patch 1 patch, 1 patch, Topical, Daily, Phong Sy MD, 1 patch at 03/11/24 0832    lisinopril (ZESTRIL) tablet 10 mg, 10 mg, Oral, Daily, ARIANE Quinones, 10 mg at 03/11/24 0832    magnesium sulfate 2 g/50 mL IVPB (premix) 2 g, 2 g, Intravenous, Once, Fannie Hernandez MD, Last Rate: 25 mL/hr at 03/11/24 0838, 2 g at 03/11/24 0838    methocarbamol (ROBAXIN) tablet 500 mg, 500 mg, Oral, Q6H PRN, Phong Sy MD    oxyCODONE (ROXICODONE) split tablet 2.5 mg, 2.5 mg, Oral, Q6H PRN, Phong Sy MD, 2.5 mg at 03/10/24 1256    thiamine tablet 100 mg, 100 mg, Oral, Daily, ARIANE Quinones, 100 mg at 03/11/24 0832    REVIEW OF SYSTEMS:  A complete review of systems was performed and found to be negative unless otherwise  noted in the history of present illness.  General: No fevers, chills.   Cardiovascular:  No chest pain, No leg edema.  Respiratory: No cough, sputum production,  No shortness of breath.  Gastrointestinal:  No nausea/vomiting,  + diarrhea,  No abdominal pain.  Genitourinary: No hematuria.  No foamy urine.  No dysuria    PHYSICAL EXAM:  Current Weight: Weight - Scale: 60 kg (132 lb 4.4 oz)  First Weight: Weight - Scale: 66.2 kg (145 lb 15.1 oz)  Vitals:    03/10/24 1500 03/10/24 2302 03/11/24 0600 03/11/24 0714   BP:  141/75  161/84   BP Location:  Left arm  Left arm   Pulse:  86  76   Resp: 20 18  17   Temp:  98.9 °F (37.2 °C)  97.6 °F (36.4 °C)   TempSrc: Oral Oral  Oral   SpO2:  98%  100%   Weight:   60 kg (132 lb 4.4 oz)    Height:           Intake/Output Summary (Last 24 hours) at 3/11/2024 0919  Last data filed at 3/11/2024 0815  Gross per 24 hour   Intake 600 ml   Output 275 ml   Net 325 ml     General: NAD  Skin: warm, dry, intact, no rash  HEENT: Moist mucous membranes, sclera anicteric, normocephalic, atraumatic  Neck: No apparent JVD appreciated  Chest:lung sounds clear B/L, on RA   CVS:Regular rate and rhythm, no murmer   Abdomen: Soft, round, non-tender, +BS  Extremities: No B/L LE edema present  Neuro: alert and oriented  Psych: appropriate mood and affect     Invasive Devices:      Lab Results:   Results from last 7 days   Lab Units 03/11/24  0544 03/10/24  0524 03/09/24  0915 03/09/24  0512 03/08/24  0522   WBC Thousand/uL 10.00 9.51 11.65*  --  9.19   HEMOGLOBIN g/dL 10.0* 10.4* 9.8*  --  12.1   HEMATOCRIT % 29.2* 29.8* 28.1*  --  35.2*   PLATELETS Thousands/uL 218 202 181  --  229   SODIUM mmol/L 136 134*  --  136 137   POTASSIUM mmol/L 4.1 4.0  --  4.4 3.7   CHLORIDE mmol/L 113* 110*  --  114* 109*   CO2 mmol/L 19* 21  --  18* 21   BUN mg/dL 16 11  --  16 21   CREATININE mg/dL 1.81* 1.69*  --  1.42* 1.38*   CALCIUM mg/dL 7.6* 7.8*  --  7.5* 8.5   MAGNESIUM mg/dL 1.6*  --   --  1.7*  --    PHOSPHORUS  mg/dL  --   --   --  2.7  --    ALK PHOS U/L  --   --   --   --  117*   ALT U/L  --   --   --   --  72*   AST U/L  --   --   --   --  50*

## 2024-03-11 NOTE — ASSESSMENT & PLAN NOTE
Admitted to the ICU, reportedly self administered an inappropriate dose of Lantus insulin 20 units  Had multiple prior hospital admissions for hypoglycemia  Treated with D5, with subsequent blood glucose checks improving  Doing well on Lantus at decreased dose of 5 units in a.m.   continue sliding scale and Accu-Cheks  Monitor blood glucose   Hypoglycemia protocol

## 2024-03-11 NOTE — PROGRESS NOTES
[facility]  Progress Note  Name: Wes Araujo I  MRN: [id number]  Unit/Bed#: E5 -01 I Date of Admission: 3/8/2024   Date of Service: 3/11/2024 I Hospital Day: 3    Assessment/Plan   * Hypoglycemia  Assessment & Plan  Admitted to the ICU, reportedly self administered an inappropriate dose of Lantus insulin 20 units  Had multiple prior hospital admissions for hypoglycemia  Treated with D5, with subsequent blood glucose checks improving  Doing well on Lantus at decreased dose of 5 units in a.m.   continue sliding scale and Accu-Cheks  Monitor blood glucose   Hypoglycemia protocol    Acute renal failure superimposed on stage 3 chronic kidney disease (HCC)  Assessment & Plan  Lab Results   Component Value Date    EGFR 41 03/11/2024    EGFR 45 03/10/2024    EGFR 55 03/09/2024    CREATININE 1.81 (H) 03/11/2024    CREATININE 1.69 (H) 03/10/2024    CREATININE 1.42 (H) 03/09/2024     Creatinine uptrending  Nephrology consulted, recommendations appreciated  I&O monitoring  Avoid nephrotoxins    Chronic steatorrhea  Assessment & Plan  Patient complaining of chronic steatorrhea  Suspect patient may have some form of pancreatic insufficiency given chronic EtOH abuse  GI consulted    Transaminitis  Assessment & Plan  Noted on admission  Suspect secondary to chronic EtOH use  RUQ ultrasound ordered  F/u hepatitis panel  GI consulted    Acute low back pain  Assessment & Plan  Patient reporting lower back pain and states this has been a chronic issue for him and that it has been going on for years  Xray lumbar spine showing no osseous abnormality  Continue Tylenol, Lidoderm patch, Robaxin and low-dose oxycodone as needed  PT, OT evaluation complete, patient with no needs    Alcohol abuse with history of withdrawal (HCC)  Assessment & Plan  Reportedly drinks up to 6 beers daily  History of alcohol withdrawals, last drink reported 03/07  WA protocol, thiamine and folate supplementation    Type 2  diabetes mellitus with hypoglycemia, with long-term current use of insulin ([facility])  Assessment & Plan  Lab Results   Component Value Date    HGBA1C 15.7 (H) 01/26/2024       Recent Labs     03/10/24  2103 03/11/24  0714 03/11/24  1110 03/11/24  1613   POCGLU 370* 166* 147* 236*       Previous A1c not well-controlled at 15.7  Previously discharged home on Lantus 10 units, empagliflozin 25mg daily, trulicity once weekly  Unclear of compliance with diabetes medication regimen, presented with hypoglycemia and managed in the ICU  Glucose better controlled on Lantus 5 units and sliding scale    Essential hypertension  Assessment & Plan  Uncontrolled  Discontinue lisinopril given ZENAIDA as per Nephro  Increase Norvasc 5 mg BID         VTE Pharmacologic Prophylaxis:   Moderate Risk (Score 3-4) - Pharmacological DVT Prophylaxis Ordered: heparin.    Mobility:   Basic Mobility Inpatient Raw Score: 19  JH-HLM Goal: 6: Walk 10 steps or more  JH-HLM Achieved: 7: Walk 25 feet or more  HLM Goal achieved. Continue to encourage appropriate mobility.    Patient Centered Rounds: I performed bedside rounds with nursing staff today.   Discussions with Specialists or Other Care Team Provider: Nephrology, PT    Education and Discussions with Family / Patient: Updated  (father) at bedside.    Current Length of Stay: 3 day(s)  Current Patient Status: Inpatient   Certification Statement: The patient will continue to require additional inpatient hospital stay due to ongoing work-up for ZENAIDA, steatorrhea  Discharge Plan: Anticipate discharge in 24-48 hrs to home.    Code Status: Level 1 - Full Code    Subjective:   Patient seen and examined at bedside. His main concern today was his back pain stating that it is still present and that he has been dealing with this for years. States that it is affecting his ambulation. Another concern of his at this time is that his stool is fatty and that when he eats a fatty meal, he wakes up in the  middle of the night soaked in fatty/oily fecal material. He states it has been going on for a while now. He does endorse heavy alcohol drinking. Denies any other complaints at this time.     Objective:     Vitals:   Temp (24hrs), Av.4 °F (36.9 °C), Min:97.6 °F (36.4 °C), Max:98.9 °F (37.2 °C)    Temp:  [97.6 °F (36.4 °C)-98.9 °F (37.2 °C)] 98.6 °F (37 °C)  HR:  [69-86] 69  Resp:  [16-18] 16  BP: (128-161)/(65-84) 128/65  SpO2:  [98 %-100 %] 99 %  Body mass index is 19.53 kg/m².     Input and Output Summary (last 24 hours):     Intake/Output Summary (Last 24 hours) at 3/11/2024 1713  Last data filed at 3/11/2024 0958  Gross per 24 hour   Intake 600 ml   Output 675 ml   Net -75 ml       Physical Exam:   Physical Exam  Vitals and nursing note reviewed.   Constitutional:       General: He is not in acute distress.     Appearance: He is well-developed.   HENT:      Head: Normocephalic and atraumatic.   Eyes:      Conjunctiva/sclera: Conjunctivae normal.   Cardiovascular:      Rate and Rhythm: Normal rate and regular rhythm.      Heart sounds: No murmur heard.  Pulmonary:      Effort: Pulmonary effort is normal. No respiratory distress.      Breath sounds: Normal breath sounds.   Abdominal:      Palpations: Abdomen is soft.      Tenderness: There is no abdominal tenderness.   Musculoskeletal:         General: No swelling.      Cervical back: Neck supple.   Skin:     General: Skin is warm and dry.      Capillary Refill: Capillary refill takes less than 2 seconds.   Neurological:      Mental Status: He is alert.   Psychiatric:         Mood and Affect: Mood normal.            Additional Data:     Labs:  Results from last 7 days   Lab Units 24  0544   WBC Thousand/uL 10.00   HEMOGLOBIN g/dL 10.0*   HEMATOCRIT % 29.2*   PLATELETS Thousands/uL 218   NEUTROS PCT % 61   LYMPHS PCT % 28   MONOS PCT % 8   EOS PCT % 3     Results from last 7 days   Lab Units 24  0544 24  0512 24  0522   SODIUM mmol/L 136    < > 137   POTASSIUM mmol/L 4.1   < > 3.7   CHLORIDE mmol/L 113*   < > 109*   CO2 mmol/L 19*   < > 21   BUN mg/dL 16   < > 21   CREATININE mg/dL 1.81*   < > 1.38*   ANION GAP mmol/L 4   < > 7   CALCIUM mg/dL 7.6*   < > 8.5   ALBUMIN g/dL  --   --  3.7   TOTAL BILIRUBIN mg/dL  --   --  0.33   ALK PHOS U/L  --   --  117*   ALT U/L  --   --  72*   AST U/L  --   --  50*   GLUCOSE RANDOM mg/dL 102   < > 81    < > = values in this interval not displayed.     Results from last 7 days   Lab Units 03/08/24  0522   INR  0.97     Results from last 7 days   Lab Units 03/11/24  1613 03/11/24  1110 03/11/24  0714 03/10/24  2103 03/10/24  1617 03/10/24  1259 03/10/24  0624 03/10/24  0606 03/09/24  2150 03/09/24  2003 03/09/24  1752 03/09/24  1607   POC GLUCOSE mg/dl 236* 147* 166* 370* 265* 194* 202* 172* 243* 214* 227* 195*               Lines/Drains:  Invasive Devices       Peripheral Intravenous Line  Duration             Peripheral IV 03/11/24 Dorsal (posterior);Left Forearm <1 day                          Imaging: Reviewed radiology reports from this admission including: xray(s)    Recent Cultures (last 7 days):         Last 24 Hours Medication List:   Current Facility-Administered Medications   Medication Dose Route Frequency Provider Last Rate    acetaminophen  975 mg Oral Q8H [facility] Phong Sy MD      amLODIPine  5 mg Oral BID ARIANE Reardon      chlorhexidine  15 mL Mouth/Throat Q12H [facility] ARIANE Quinones      folic acid  400 mcg Oral Daily ARIANE Quinones      heparin (porcine)  5,000 Units Subcutaneous Q8H [facility] ARIANE Quinones      insulin glargine  5 Units Subcutaneous QAM Phong Sy MD      insulin lispro  1-5 Units Subcutaneous TID AC Phong Sy MD      lidocaine  1 patch Topical Daily Phong Sy MD      methocarbamol  500 mg Oral Q6H PRN Phong Sy MD      oxyCODONE  2.5 mg Oral Q6H PRN Phong Sy MD      sodium bicarbonate 150 mEq in dextrose 5 % 1,000 mL infusion   100 mL/hr Intravenous Continuous ARIANE Reardon 100 mL/hr (03/11/24 1135)    thiamine  100 mg Oral Daily ARIANE Quinones          Today, Patient Was Seen By: Fannie York MD    **Please Note: This note may have been constructed using a voice recognition system.**

## 2024-03-11 NOTE — ASSESSMENT & PLAN NOTE
Patient complaining of chronic steatorrhea  Suspect patient may have some form of pancreatic insufficiency given chronic EtOH abuse  GI consulted

## 2024-03-11 NOTE — OCCUPATIONAL THERAPY NOTE
Occupational Therapy Evaluation     Patient Name: Wes Araujo  Today's Date: 3/11/2024  Problem List  Principal Problem:    Hypoglycemia  Active Problems:    Essential hypertension    Type 2 diabetes mellitus with hypoglycemia, with long-term current use of insulin (HCC)    CKD (chronic kidney disease), stage III (HCC)    Alcohol abuse with history of withdrawal (HCC)    Acute low back pain    Past Medical History  Past Medical History:   Diagnosis Date    Alcohol abuse     Chronic pancreatitis (HCC)     Diabetes mellitus (HCC)     Type 2    Pancreatitis     Paroxysmal A-fib (HCC)     Thrombocytopenia (HCC)      Past Surgical History  Past Surgical History:   Procedure Laterality Date    INCISION AND DRAINAGE OF WOUND N/A 8/16/2020    Procedure: INCISION AND DRAINAGE (I&D) HEAD/FACE;  Surgeon: Estrada Walton DMD;  [facility]: [facility];  Service: Maxillofacial    IR BIOPSY BONE MARROW  2/7/2019    REMOVAL OF IMPACTED TOOTH - COMPLETELY BONY N/A 8/16/2020    Procedure: EXTRACTION TEETH MULTIPLE #2, 3;  Surgeon: Estrada Walton DMD;  [facility];  Service: Maxillofacial           03/11/24 0857   OT Last Visit   OT Visit Date 03/11/24   Note Type   Note type Evaluation   Pain Assessment   Pain Assessment Tool 0-10   Pain Score 8   Pain Location/Orientation Location: Back   Patient's Stated Pain Goal No pain   Hospital Pain Intervention(s) Repositioned;Ambulation/increased activity;Emotional support;Rest   Multiple Pain Sites No   Restrictions/Precautions   Weight Bearing Precautions Per Order No   Other Precautions Fall Risk;Pain;Multiple lines;Chair Alarm;Bed Alarm   Home Living   Type of Home Apartment   Home Layout One level;Stairs to enter with rails  (2nd floor apt)   Bathroom Shower/Tub Tub/shower unit   Bathroom Toilet Standard   Bathroom Equipment   (Denies DME)   Bathroom Accessibility Accessible   Home Equipment   (Denies DME)   Additional Comments Pt lives with parents in a 2nd floor apt with FOS  to enter.   Prior Function   Level of [place] Independent with ADLs;Independent with functional mobility  (Shares IADLs w/ family)   Lives With Other (Comment)  (Parents)   Receives Help From Family   IADLs Independent with medication management;Family/Friend/Other provides transportation;Family/Friend/Other provides meals   Falls in the last 6 months 0   Vocational   (Pt reports not currently working)   Comments At baseline, pt was I w/ ADLs and functional transfers/mobility w/o use of AD. Pt shares IADLs w/ family. (-) . Denies falls PTA.   Lifestyle   Autonomy At baseline, pt was I w/ ADLs and functional transfers/mobility w/o use of AD. Pt shares IADLs w/ family. (-) . Denies falls PTA.   Reciprocal Relationships Parents   Service to Others Pt reports not currently working   ADL   Where Assessed Edge of bed   Eating Assistance 7  Independent   Grooming Assistance 7  Independent   UB Bathing Assistance 6  Modified Independent   LB Bathing Assistance 5  Supervision/Setup   UB Dressing Assistance 6  Modified independent   LB Dressing Assistance 5  Supervision/Setup   Toileting Assistance  5  Supervision/Setup   Functional Assistance 5  Supervision/Setup   Bed Mobility   Supine to Sit 6  Modified independent   Additional items HOB elevated;Bedrails;Increased time required   Sit to Supine 6  Modified independent   Additional items Increased time required   Additional Comments Pt lying supine with bed alarm activated at end of session. Call bell and phone within reach. All needs met and pt reports no further questions for OT at this time.   Transfers   Sit to Stand 6  Modified independent   Additional items Increased time required;Bedrails   Stand to Sit 5  Supervision   Additional items Increased time required;Verbal cues   Additional Comments Cues for increased safety awareness for stand>sit (ie: fully reach chair surface prior to sitting)   Functional Mobility   Functional Mobility 5  Supervision    Additional Comments Assist x1 w/o use of AD   Balance   Static Sitting Good   Dynamic Sitting Fair +   Static Standing Fair   Dynamic Standing Fair   Ambulatory Fair -   Activity Tolerance   Activity Tolerance Patient tolerated treatment well   Medical Staff Made Aware JACKIE Gerardo   Nurse Made Aware yes   RUE Assessment   RUE Assessment WFL  (4/5 throughout)   LUE Assessment   LUE Assessment WFL  (4/5 throughout)   Hand Function   Gross Motor Coordination Functional   Fine Motor Coordination Functional   Sensation   Light Touch No apparent deficits   Proprioception   Proprioception No apparent deficits   Vision-Basic Assessment   Current Vision Wears glasses all the time   Vision - Complex Assessment   Ocular Range of Motion Intact   Acuity Able to read clock/calendar on wall without difficulty;Able to read employee name badge without difficulty   Psychosocial   Psychosocial (WDL) WDL   Perception   Inattention/Neglect Appears intact   Cognition   Overall Cognitive Status WFL   Arousal/Participation Alert;Cooperative   Attention Attends with cues to redirect   Orientation Level Oriented X4   Memory Decreased recall of precautions   Following Commands Follows one step commands with increased time or repetition   Comments Khmer speaking. Use of [employer] #[id number] for translation   Assessment   Prognosis Good   Assessment Pt is a 54 y.o. male seen for OT evaluation s/p adm to [facility] on 3/8/2024 w/ Hypoglycemia . Comorbidities affecting pt’s functional performance include a significant PMH of Alcohol abuse, type 2 diabetes mellitus on long-term insulin poorly controlled due to medication noncompliance, paroxysmal A-Fib, HTN, CKD stage III. Pt with active OT orders and activity orders for OOB to chair. Pt lives with parents in a 2nd floor apt with FOS to enter. At baseline, pt was I w/ ADLs and functional transfers/mobility w/o use of AD. Pt shares IADLs w/ family. (-) . Denies falls PTA. Upon  evaluation, pt currently functioning at a Supervision-Mod I level for ADLs, Mod I for bed mobility, and Supervision for functional mobility/transfers 2* the following deficits impacting occupational performance: decreased balance, decreased activity tolerance, decreased safety awareness, and increased pain. Pt with the following personal factors of: [place] home environment, difficulty performing IADLs, fall risk , and access to transportation . Despite above mentioned deficits and personal factors, pt is functioning near baseline level of performance. Limited ADL deficits. Based on the aforementioned OT evaluation, functional performance deficits, and assessments, pt has been identified as a Moderate complexity evaluation. No further acute OT needs identified at this time. Recommend continued mobilization with hospital staff and restorative program while in the hospital to increase pt’s endurance and strength upon D/C. The patient's raw score on the AM-PAC Daily Activity Inpatient Short Form is 21. A raw score of greater than or equal to 19 suggests the patient may benefit from discharge to home. Please refer to the recommendation of the Occupational Therapist for safe discharge planning. D/C pt from OT caseload at this time.   Plan   OT Frequency Eval only  (D/C OT)   Discharge Recommendation   Rehab Resource Intensity Level, OT No post-acute rehabilitation needs   AM-PAC Daily Activity Inpatient   Lower Body Dressing 3   Bathing 3   Toileting 3   Upper Body Dressing 4   Grooming 4   Eating 4   Daily Activity Raw Score 21   Daily Activity Standardized Score (Calc for Raw Score >=11) 44.27   AM-PAC Applied Cognition Inpatient   Following a Speech/Presentation 4   Understanding Ordinary Conversation 4   Taking Medications 3   Remembering Where Things Are Placed or Put Away 4   Remembering List of 4-5 Errands 3   Taking Care of Complicated Tasks 3   Applied Cognition Raw Score 21   Applied Cognition Standardized Score  44.3        Barby White, OTR/L

## 2024-03-11 NOTE — PLAN OF CARE

## 2024-03-11 NOTE — ASSESSMENT & PLAN NOTE
Lab Results   Component Value Date    EGFR 41 03/11/2024    EGFR 45 03/10/2024    EGFR 55 03/09/2024    CREATININE 1.81 (H) 03/11/2024    CREATININE 1.69 (H) 03/10/2024    CREATININE 1.42 (H) 03/09/2024     Creatinine uptrending  Nephrology consulted, recommendations appreciated  I&O monitoring  Avoid nephrotoxins

## 2024-03-11 NOTE — ASSESSMENT & PLAN NOTE
Lab Results   Component Value Date    HGBA1C 15.7 (H) 01/26/2024       Recent Labs     03/10/24  2103 03/11/24  0714 03/11/24  1110 03/11/24  1613   POCGLU 370* 166* 147* 236*       Previous A1c not well-controlled at 15.7  Previously discharged home on Lantus 10 units, empagliflozin 25mg daily, trulicity once weekly  Unclear of compliance with diabetes medication regimen, presented with hypoglycemia and managed in the ICU  Glucose better controlled on Lantus 5 units and sliding scale

## 2024-03-11 NOTE — ASSESSMENT & PLAN NOTE
Reportedly drinks up to 6 beers daily  History of alcohol withdrawals, last drink reported 03/07  [facility] protocol, thiamine and folate supplementation

## 2024-03-11 NOTE — UTILIZATION REVIEW
NOTIFICATION OF INPATIENT ADMISSION   AUTHORIZATION REQUEST   SERVICING FACILITY:   [place] [address]  Tax ID: [id number]  NPI: [id number] ATTENDING PROVIDER:  Attending Name and NPI#: Fannie Robbins Md [[id number]]  Address: [address] [place] [address]  Phone: [phone]     ADMISSION INFORMATION:  Place of Service: Inpatient [facility]  Place of Service Code: 21  Inpatient Admission Date/Time: 3/8/24  8:42 AM  Discharge Date/Time: No discharge date for patient encounter.  Admitting Diagnosis Code/Description:  Hypoglycemia [E16.2]  Hypothermia, initial encounter [T68.XXXA]  History of medical problems [Z87.898]     UTILIZATION REVIEW CONTACT:  Katie Almeida, Utilization   Network Utilization Review Department  Phone: [phone]  Fax [phone]  Email: [email or web address]  Contact for approvals/pending authorizations, clinical reviews, and discharge.     PHYSICIAN ADVISORY SERVICES:  Medical Necessity Denial & Znrw-cz-Ehos Review  Phone: [phone]  Fax: [phone]  Email: [email or web address]     DISCHARGE SUPPORT TEAM:  For Patients Discharge Needs & Updates  Phone: [phone] opt. 2 Fax: [phone]  Email: [email or web address]

## 2024-03-11 NOTE — ASSESSMENT & PLAN NOTE
Noted on admission  Suspect secondary to chronic EtOH use  RUQ ultrasound ordered  F/u hepatitis panel  GI consulted

## 2024-03-12 ENCOUNTER — APPOINTMENT (INPATIENT)
Dept: ULTRASOUND IMAGING | Facility: HOSPITAL | Age: 54
DRG: 469 | End: 2024-03-12
Payer: COMMERCIAL

## 2024-03-12 ENCOUNTER — APPOINTMENT (INPATIENT)
Dept: MRI IMAGING | Facility: HOSPITAL | Age: 54
DRG: 469 | End: 2024-03-12
Payer: COMMERCIAL

## 2024-03-12 PROBLEM — K86.0 ALCOHOL-INDUCED CHRONIC PANCREATITIS (HCC): Status: ACTIVE | Noted: 2024-03-11

## 2024-03-12 LAB
ALBUMIN SERPL BCP-MCNC: 2.8 G/DL (ref 3.5–5)
ALP SERPL-CCNC: 73 U/L (ref 34–104)
ALT SERPL W P-5'-P-CCNC: 31 U/L (ref 7–52)
ANION GAP SERPL CALCULATED.3IONS-SCNC: 6 MMOL/L
AST SERPL W P-5'-P-CCNC: 21 U/L (ref 13–39)
BILIRUB SERPL-MCNC: 0.25 MG/DL (ref 0.2–1)
BUN SERPL-MCNC: 16 MG/DL (ref 5–25)
CALCIUM ALBUM COR SERPL-MCNC: 8.7 MG/DL (ref 8.3–10.1)
CALCIUM SERPL-MCNC: 7.7 MG/DL (ref 8.4–10.2)
CHLORIDE SERPL-SCNC: 109 MMOL/L (ref 96–108)
CO2 SERPL-SCNC: 20 MMOL/L (ref 21–32)
CREAT SERPL-MCNC: 1.67 MG/DL (ref 0.6–1.3)
GFR SERPL CREATININE-BSD FRML MDRD: 45 ML/MIN/1.73SQ M
GLUCOSE SERPL-MCNC: 153 MG/DL (ref 65–140)
GLUCOSE SERPL-MCNC: 185 MG/DL (ref 65–140)
GLUCOSE SERPL-MCNC: 205 MG/DL (ref 65–140)
GLUCOSE SERPL-MCNC: 291 MG/DL (ref 65–140)
GLUCOSE SERPL-MCNC: 65 MG/DL (ref 65–140)
HBV CORE AB SER QL: NORMAL
HBV CORE IGM SER QL: NORMAL
HBV SURFACE AG SER QL: NORMAL
HCV AB SER QL: NORMAL
MAGNESIUM SERPL-MCNC: 1.9 MG/DL (ref 1.9–2.7)
POTASSIUM SERPL-SCNC: 4.1 MMOL/L (ref 3.5–5.3)
PROT SERPL-MCNC: 5.2 G/DL (ref 6.4–8.4)
SODIUM SERPL-SCNC: 135 MMOL/L (ref 135–147)
TRIGL SERPL-MCNC: 79 MG/DL

## 2024-03-12 PROCEDURE — 82784 ASSAY IGA/IGD/IGG/IGM EACH: CPT | Performed by: INTERNAL MEDICINE

## 2024-03-12 PROCEDURE — 83735 ASSAY OF MAGNESIUM: CPT | Performed by: STUDENT IN AN ORGANIZED HEALTH CARE EDUCATION/TRAINING PROGRAM

## 2024-03-12 PROCEDURE — 82787 IGG 1 2 3 OR 4 EACH: CPT | Performed by: INTERNAL MEDICINE

## 2024-03-12 PROCEDURE — 76705 ECHO EXAM OF ABDOMEN: CPT

## 2024-03-12 PROCEDURE — 87340 HEPATITIS B SURFACE AG IA: CPT | Performed by: STUDENT IN AN ORGANIZED HEALTH CARE EDUCATION/TRAINING PROGRAM

## 2024-03-12 PROCEDURE — 86803 HEPATITIS C AB TEST: CPT | Performed by: STUDENT IN AN ORGANIZED HEALTH CARE EDUCATION/TRAINING PROGRAM

## 2024-03-12 PROCEDURE — 86231 EMA EACH IG CLASS: CPT | Performed by: INTERNAL MEDICINE

## 2024-03-12 PROCEDURE — 86258 DGP ANTIBODY EACH IG CLASS: CPT | Performed by: INTERNAL MEDICINE

## 2024-03-12 PROCEDURE — 82948 REAGENT STRIP/BLOOD GLUCOSE: CPT

## 2024-03-12 PROCEDURE — 99232 SBSQ HOSP IP/OBS MODERATE 35: CPT | Performed by: STUDENT IN AN ORGANIZED HEALTH CARE EDUCATION/TRAINING PROGRAM

## 2024-03-12 PROCEDURE — 84478 ASSAY OF TRIGLYCERIDES: CPT | Performed by: INTERNAL MEDICINE

## 2024-03-12 PROCEDURE — 74181 MRI ABDOMEN W/O CONTRAST: CPT

## 2024-03-12 PROCEDURE — 99232 SBSQ HOSP IP/OBS MODERATE 35: CPT | Performed by: INTERNAL MEDICINE

## 2024-03-12 PROCEDURE — 86705 HEP B CORE ANTIBODY IGM: CPT | Performed by: STUDENT IN AN ORGANIZED HEALTH CARE EDUCATION/TRAINING PROGRAM

## 2024-03-12 PROCEDURE — 86364 TISS TRNSGLTMNASE EA IG CLAS: CPT | Performed by: INTERNAL MEDICINE

## 2024-03-12 PROCEDURE — 80053 COMPREHEN METABOLIC PANEL: CPT | Performed by: STUDENT IN AN ORGANIZED HEALTH CARE EDUCATION/TRAINING PROGRAM

## 2024-03-12 PROCEDURE — 97163 PT EVAL HIGH COMPLEX 45 MIN: CPT

## 2024-03-12 PROCEDURE — 86704 HEP B CORE ANTIBODY TOTAL: CPT | Performed by: STUDENT IN AN ORGANIZED HEALTH CARE EDUCATION/TRAINING PROGRAM

## 2024-03-12 PROCEDURE — 99254 IP/OBS CNSLTJ NEW/EST MOD 60: CPT | Performed by: INTERNAL MEDICINE

## 2024-03-12 RX ORDER — HYDRALAZINE HYDROCHLORIDE 25 MG/1
25 TABLET, FILM COATED ORAL EVERY 8 HOURS SCHEDULED
Status: DISCONTINUED | OUTPATIENT
Start: 2024-03-12 | End: 2024-03-14 | Stop reason: HOSPADM

## 2024-03-12 RX ORDER — ERGOCALCIFEROL 1.25 MG/1
50000 CAPSULE ORAL WEEKLY
Status: DISCONTINUED | OUTPATIENT
Start: 2024-03-12 | End: 2024-03-14 | Stop reason: HOSPADM

## 2024-03-12 RX ADMIN — HEPARIN SODIUM 5000 UNITS: 5000 INJECTION INTRAVENOUS; SUBCUTANEOUS at 05:38

## 2024-03-12 RX ADMIN — HYDRALAZINE HYDROCHLORIDE 25 MG: 25 TABLET ORAL at 21:21

## 2024-03-12 RX ADMIN — PANCRELIPASE 48000 UNITS: 24000; 76000; 120000 CAPSULE, DELAYED RELEASE PELLETS ORAL at 13:36

## 2024-03-12 RX ADMIN — ACETAMINOPHEN 325MG 975 MG: 325 TABLET ORAL at 05:38

## 2024-03-12 RX ADMIN — ACETAMINOPHEN 325MG 975 MG: 325 TABLET ORAL at 21:20

## 2024-03-12 RX ADMIN — INSULIN LISPRO 3 UNITS: 100 INJECTION, SOLUTION INTRAVENOUS; SUBCUTANEOUS at 17:21

## 2024-03-12 RX ADMIN — HEPARIN SODIUM 5000 UNITS: 5000 INJECTION INTRAVENOUS; SUBCUTANEOUS at 21:20

## 2024-03-12 RX ADMIN — LIDOCAINE 5% 1 PATCH: 700 PATCH TOPICAL at 08:12

## 2024-03-12 RX ADMIN — INSULIN GLARGINE 5 UNITS: 100 INJECTION, SOLUTION SUBCUTANEOUS at 08:11

## 2024-03-12 RX ADMIN — HYDRALAZINE HYDROCHLORIDE 25 MG: 25 TABLET ORAL at 17:21

## 2024-03-12 RX ADMIN — ACETAMINOPHEN 325MG 975 MG: 325 TABLET ORAL at 13:36

## 2024-03-12 RX ADMIN — THIAMINE HCL TAB 100 MG 100 MG: 100 TAB at 08:10

## 2024-03-12 RX ADMIN — HEPARIN SODIUM 5000 UNITS: 5000 INJECTION INTRAVENOUS; SUBCUTANEOUS at 13:36

## 2024-03-12 RX ADMIN — AMLODIPINE BESYLATE 5 MG: 5 TABLET ORAL at 08:10

## 2024-03-12 RX ADMIN — FOLIC ACID TAB 400 MCG 400 MCG: 400 TAB at 08:10

## 2024-03-12 RX ADMIN — AMLODIPINE BESYLATE 5 MG: 5 TABLET ORAL at 17:21

## 2024-03-12 RX ADMIN — INSULIN LISPRO 1 UNITS: 100 INJECTION, SOLUTION INTRAVENOUS; SUBCUTANEOUS at 08:11

## 2024-03-12 RX ADMIN — ERGOCALCIFEROL 50000 UNITS: 1.25 CAPSULE ORAL at 13:36

## 2024-03-12 RX ADMIN — PANCRELIPASE 48000 UNITS: 24000; 76000; 120000 CAPSULE, DELAYED RELEASE PELLETS ORAL at 17:21

## 2024-03-12 NOTE — PROGRESS NOTES
[facility]  Progress Note  Name: Wes Araujo I  MRN: [id number]  Unit/Bed#: E5 -01 I Date of Admission: 3/8/2024   Date of Service: 3/12/2024 I Hospital Day: 4    Assessment/Plan   * Hypoglycemia  Assessment & Plan  Admitted to the ICU, reportedly self administered an inappropriate dose of Lantus insulin 20 units  Had multiple prior hospital admissions for hypoglycemia  Treated with D5, with subsequent blood glucose checks improving  Doing well on Lantus at decreased dose of 5 units in a.m.   continue sliding scale and Accu-Cheks  Monitor blood glucose   Hypoglycemia protocol    Acute renal failure superimposed on stage 3 chronic kidney disease (HCC)  Assessment & Plan  Lab Results   Component Value Date    EGFR 45 03/12/2024    EGFR 41 03/11/2024    EGFR 45 03/10/2024    CREATININE 1.67 (H) 03/12/2024    CREATININE 1.81 (H) 03/11/2024    CREATININE 1.69 (H) 03/10/2024     Creatinine peaked at 1.81, now downtrending  Nephrology consulted, recommendations appreciated  I&O monitoring  Avoid nephrotoxins    Alcohol-induced chronic pancreatitis (HCC)  Assessment & Plan  Patient complaining of chronic steatorrhea which is likely from pancreatic insufficiency given chronic pancreatitis in setting of EtOH abuse  RUQ ultrasound showing: Dilatation of the pancreatic duct up to 6 mm that appears chronic. Calcification in the region of the pancreatic duct in keeping with history of chronic pancreatitis.   GI recommendations appreciated  F/u fecal elastase  Start Creon 48,000 units with each meal and 24,000 units with snacks  F/u other labs for chronic diarrhea as per GI    Transaminitis  Assessment & Plan  Noted on admission, now normalized  Suspect secondary to chronic EtOH use  RUQ reviewed  F/u MRI ordered by GI  F/u hepatitis panel  GI following, encouraged alcohol cessation    Acute low back pain  Assessment & Plan  Patient reporting lower back pain and states this has been a chronic  issue for him and that it has been going on for years  Xray lumbar spine showing no osseous abnormality  Continue Tylenol, Lidoderm patch, Robaxin and low-dose oxycodone as needed  PT, OT evaluation complete, patient with no needs    Alcohol abuse with history of withdrawal (HCC)  Assessment & Plan  Reportedly drinks up to 6 beers daily  History of alcohol withdrawals, last drink reported 03/07  [facility] protocol  Continue thiamine and folate supplementation    Type 2 diabetes mellitus with hypoglycemia, with long-term current use of insulin ([facility])  Assessment & Plan  Lab Results   Component Value Date    HGBA1C 15.7 (H) 01/26/2024       Recent Labs     03/11/24  2108 03/12/24  0659 03/12/24  1127 03/12/24  1543   POCGLU 268* 153* 65 291*     Previous A1c not well-controlled at 15.7  Previously discharged home on Lantus 10 units, empagliflozin 25mg daily, trulicity once weekly  Unclear of compliance with diabetes medication regimen, presented with hypoglycemia and managed in the ICU  Glucose better controlled on Lantus 5 units and sliding scale    Essential hypertension  Assessment & Plan  Uncontrolled  Discontinue lisinopril given ZENAIDA as per Nephro  Continue Norvasc 5 mg BID  Add on Hydralazine 25 mg every 8 hours         VTE Pharmacologic Prophylaxis:   Moderate Risk (Score 3-4) - Pharmacological DVT Prophylaxis Ordered: heparin.    Mobility:   Basic Mobility Inpatient Raw Score: 24  JH-HLM Goal: 8: Walk 250 feet or more  JH-HLM Achieved: 8: Walk 250 feet ot more  HLM Goal achieved. Continue to encourage appropriate mobility.    Patient Centered Rounds: I performed bedside rounds with nursing staff today.   Discussions with Specialists or Other Care Team Provider: GI, nephrology    Education and Discussions with Family / Patient:  patient.     Current Length of Stay: 4 day(s)  Current Patient Status: Inpatient   Certification Statement: The patient will continue to require additional inpatient hospital stay due to  ongoing management for ZENAIDA  Discharge Plan: Anticipate discharge in 24-48 hrs to home.    Code Status: Level 1 - Full Code    Subjective:   Patient seen and examined at bedside. No complaints at this time. Denies pain.     Objective:     Vitals:   Temp (24hrs), Av.8 °F (37.1 °C), Min:98.7 °F (37.1 °C), Max:98.8 °F (37.1 °C)    Temp:  [98.7 °F (37.1 °C)-98.8 °F (37.1 °C)] 98.8 °F (37.1 °C)  HR:  [68-80] 80  Resp:  [12-18] 12  BP: (139-166)/(71-79) 166/79  SpO2:  [98 %-100 %] 100 %  Body mass index is 19.53 kg/m².     Input and Output Summary (last 24 hours):   No intake or output data in the 24 hours ending 24 1638    Physical Exam:   Physical Exam  Vitals and nursing note reviewed.   Constitutional:       General: He is not in acute distress.     Appearance: He is well-developed.   HENT:      Head: Normocephalic and atraumatic.   Eyes:      Conjunctiva/sclera: Conjunctivae normal.   Cardiovascular:      Rate and Rhythm: Normal rate and regular rhythm.      Heart sounds: No murmur heard.  Pulmonary:      Effort: Pulmonary effort is normal. No respiratory distress.      Breath sounds: Normal breath sounds.   Abdominal:      Palpations: Abdomen is soft.      Tenderness: There is no abdominal tenderness.   Musculoskeletal:         General: No swelling.      Cervical back: Neck supple.   Skin:     General: Skin is warm and dry.      Capillary Refill: Capillary refill takes less than 2 seconds.   Neurological:      Mental Status: He is alert.   Psychiatric:         Mood and Affect: Mood normal.        Additional Data:     Labs:  Results from last 7 days   Lab Units 24  0544   WBC Thousand/uL 10.00   HEMOGLOBIN g/dL 10.0*   HEMATOCRIT % 29.2*   PLATELETS Thousands/uL 218   NEUTROS PCT % 61   LYMPHS PCT % 28   MONOS PCT % 8   EOS PCT % 3     Results from last 7 days   Lab Units 24  0515   SODIUM mmol/L 135   POTASSIUM mmol/L 4.1   CHLORIDE mmol/L 109*   CO2 mmol/L 20*   BUN mg/dL 16   CREATININE mg/dL  1.67*   ANION GAP mmol/L 6   CALCIUM mg/dL 7.7*   ALBUMIN g/dL 2.8*   TOTAL BILIRUBIN mg/dL 0.25   ALK PHOS U/L 73   ALT U/L 31   AST U/L 21   GLUCOSE RANDOM mg/dL 205*     Results from last 7 days   Lab Units 03/08/24  0522   INR  0.97     Results from last 7 days   Lab Units 03/12/24  1543 03/12/24  1127 03/12/24  0659 03/11/24  2108 03/11/24  1613 03/11/24  1110 03/11/24  0714 03/10/24  2103 03/10/24  1617 03/10/24  1259 03/10/24  0624 03/10/24  0606   POC GLUCOSE mg/dl 291* 65 153* 268* 236* 147* 166* 370* 265* 194* 202* 172*               Lines/Drains:  Invasive Devices       Peripheral Intravenous Line  Duration             Peripheral IV 03/11/24 Dorsal (posterior);Left Forearm 1 day                          Imaging: Reviewed radiology reports from this admission including: ultrasound(s)    Recent Cultures (last 7 days):         Last 24 Hours Medication List:   Current Facility-Administered Medications   Medication Dose Route Frequency Provider Last Rate    acetaminophen  975 mg Oral Q8H [facility] Phong Sy MD      amLODIPine  5 mg Oral BID ARIANE Reardon      chlorhexidine  15 mL Mouth/Throat Q12H [facility] ARIANE Quinones      ergocalciferol  50,000 Units Oral Weekly ARIANE Reardon      folic acid  400 mcg Oral Daily ARIANE Quinones      heparin (porcine)  5,000 Units Subcutaneous Q8H [facility] ARIANE Quinones      hydrALAZINE  25 mg Oral Q8H [facility] Fannie Hernandez MD      insulin glargine  5 Units Subcutaneous QAM Phong Sy MD      insulin lispro  1-5 Units Subcutaneous TID AC Phong Sy MD      lidocaine  1 patch Topical Daily Phong Sy MD      methocarbamol  500 mg Oral Q6H PRN Phong Sy MD      oxyCODONE  2.5 mg Oral Q6H PRN Phong Sy MD      pancrelipase (Lip-Prot-Amyl)  48,000 Units Oral TID With Meals Igor Ramesh MD      sodium bicarbonate 150 mEq in dextrose 5 % 1,000 mL infusion  100 mL/hr Intravenous Continuous Tanika Ricardo,  ARIANE 100 mL/hr (03/12/24 1128)    thiamine  100 mg Oral Daily ARIANE Quinones          Today, Patient Was Seen By: Fannie York MD    **Please Note: This note may have been constructed using a voice recognition system.**

## 2024-03-12 NOTE — PLAN OF CARE
Problem: Potential for Falls  Goal: Patient will remain free of falls  Description: INTERVENTIONS:  - Educate patient/family on patient safety including physical limitations  - Instruct patient to call for assistance with activity   - Consult OT/PT to assist with strengthening/mobility   - Keep Call bell within reach  - Keep bed low and locked with side rails adjusted as appropriate  - Keep care items and personal belongings within reach  - Initiate and maintain comfort rounds  - Make Fall Risk Sign visible to staff  - Apply yellow socks and bracelet for high fall risk patients  - Consider moving patient to room near nurses station  Outcome: Progressing     Problem: PAIN - ADULT  Goal: Verbalizes/displays adequate comfort level or baseline comfort level  Description: Interventions:  - Encourage patient to monitor pain and request assistance  - Assess pain using appropriate pain scale  - Administer analgesics based on type and severity of pain and evaluate response  - Implement non-pharmacological measures as appropriate and evaluate response  - Consider cultural and social influences on pain and pain management  - Notify physician/advanced practitioner if interventions unsuccessful or patient reports new pain  Outcome: Progressing     Problem: INFECTION - ADULT  Goal: Absence or prevention of progression during hospitalization  Description: INTERVENTIONS:  - Assess and monitor for signs and symptoms of infection  - Monitor lab/diagnostic results  - Monitor all insertion sites, i.e. indwelling lines, tubes, and drains  - Monitor endotracheal if appropriate and nasal secretions for changes in amount and color  - [place] appropriate cooling/warming therapies per order  - Administer medications as ordered  - Instruct and encourage patient and family to use good hand hygiene technique  - Identify and instruct in appropriate isolation precautions for identified infection/condition  Outcome: Progressing  Goal: Absence  of fever/infection during neutropenic period  Description: INTERVENTIONS:  - Monitor WBC    Outcome: Progressing     Problem: SAFETY ADULT  Goal: Patient will remain free of falls  Description: INTERVENTIONS:  - Educate patient/family on patient safety including physical limitations  - Instruct patient to call for assistance with activity   - Consult OT/PT to assist with strengthening/mobility   - Keep Call bell within reach  - Keep bed low and locked with side rails adjusted as appropriate  - Keep care items and personal belongings within reach  - Initiate and maintain comfort rounds  - Make Fall Risk Sign visible to staff  - Apply yellow socks and bracelet for high fall risk patients  - Consider moving patient to room near nurses station  Outcome: Progressing  Goal: Maintain or return to baseline ADL function  Description: INTERVENTIONS:  - Educate patient/family on patient safety including physical limitations  - Instruct patient to call for assistance with activity   - Consult OT/PT to assist with strengthening/mobility   - Keep Call bell within reach  - Keep bed low and locked with side rails adjusted as appropriate  - Keep care items and personal belongings within reach  - Initiate and maintain comfort rounds  - Make Fall Risk Sign visible to staff  - Apply yellow socks and bracelet for high fall risk patients  - Consider moving patient to room near nurses station  Outcome: Progressing  Goal: Maintains/Returns to pre admission functional level  Description: INTERVENTIONS:  - Perform AM-PAC 6 Click Basic Mobility/ Daily Activity assessment daily.  - Set and communicate daily mobility goal to care team and patient/family/caregiver.   - Collaborate with rehabilitation services on mobility goals if consulted  - Out of bed for toileting  - Record patient progress and toleration of activity level   Outcome: Progressing     Problem: DISCHARGE PLANNING  Goal: Discharge to home or other facility with appropriate  resources  Description: INTERVENTIONS:  - Identify barriers to discharge w/patient and caregiver  - Arrange for needed discharge resources and transportation as appropriate  - Identify discharge learning needs (meds, wound care, etc.)  - Arrange for interpretive services to assist at discharge as needed  - Refer to Case Management Department for coordinating discharge planning if the patient needs post-hospital services based on physician/advanced practitioner order or complex needs related to functional status, cognitive ability, or social support system  Outcome: Progressing     Problem: Knowledge Deficit  Goal: Patient/family/caregiver demonstrates understanding of disease process, treatment plan, medications, and discharge instructions  Description: Complete learning assessment and assess knowledge base.  Interventions:  - Provide teaching at level of understanding  - Provide teaching via preferred learning methods  Outcome: Progressing

## 2024-03-12 NOTE — ASSESSMENT & PLAN NOTE
Uncontrolled  Discontinue lisinopril given ZENAIDA as per Nephro  Continue Norvasc 5 mg BID  Add on Hydralazine 25 mg every 8 hours

## 2024-03-12 NOTE — ASSESSMENT & PLAN NOTE
Patient complaining of chronic steatorrhea which is likely from pancreatic insufficiency given chronic pancreatitis in setting of EtOH abuse  RUQ ultrasound showing: Dilatation of the pancreatic duct up to 6 mm that appears chronic. Calcification in the region of the pancreatic duct in keeping with history of chronic pancreatitis.   GI recommendations appreciated  F/u fecal elastase  Start Creon 48,000 units with each meal and 24,000 units with snacks  F/u other labs for chronic diarrhea as per GI

## 2024-03-12 NOTE — ASSESSMENT & PLAN NOTE
Lab Results   Component Value Date    HGBA1C 15.7 (H) 01/26/2024       Recent Labs     03/11/24  2108 03/12/24  0659 03/12/24  1127 03/12/24  1543   POCGLU 268* 153* 65 291*     Previous A1c not well-controlled at 15.7  Previously discharged home on Lantus 10 units, empagliflozin 25mg daily, trulicity once weekly  Unclear of compliance with diabetes medication regimen, presented with hypoglycemia and managed in the ICU  Glucose better controlled on Lantus 5 units and sliding scale

## 2024-03-12 NOTE — PROGRESS NOTES
NEPHROLOGY PROGRESS NOTE   Wes Araujo 54 y.o. male MRN: [id number]  Unit/Bed#: E5 -01 Encounter: [id number]  Reason for Consult: ZENAIDA on suspected CKD II/IIIA    54-year-old Kazakh-speaking male with history of poorly controlled diabetes, hypertension, alcohol abuse, medical noncompliance, who presents with altered mental status.  He was found to be hypoglycemic.  Nephrology is consulted due to acute kidney injury.    ASSESSMENT/PLAN:  Acute kidney injury on suspected CKD stage II/IIIA: Prerenal related to diarrhea/volume depletion as well as ACE inhibition.  -ZENAIDA in February with peak creatinine of 7.4 and discharge creatinine of 2.4.  -Baseline creatinine 0.8-1.1.  -Peak creatinine 1.81 on 3/11, improved to 1.67 today.  -Presented with a creatinine of 1.38.  -Continue holding ACE inhibitor.  -Will give 1 more liter of IV bicarbonate.  -UA with glucose, +1 protein, 0-3 hyaline casts.  -Bladder scan insignificant.  -Prior renal imaging unremarkable.  Consider checking renal ultrasound if creatinine continues to increase.  -Continue to avoid nephrotoxins, hypotension, IV contrast.  -I/O.     Hypomagnesemia: Status post 2 g IV mag x 1.  Suspect related to diarrheal stools.  -Repeat mag level normal. Continue to monitor and replace as needed.     Nongap acidosis: Suspect related to increased GI loss with diarrhea versus increasing creatinine.  -Received 1 L of IV bicarbonate. Will give additional liter today.     Hypocalcemia: Corrects to normal to albumin.  -.8, vitamin D 25 hydroxy level severely low, less than 7.0.  -start on ergocalciferol 50,000 units weekly.     Hypertension: Blood pressure is improved.   -Home medication: Lisinopril 10 mg daily.  -Current medication: Amlodipine 5 mg BID, lisinopril 10 mg daily.  Recommendation: Continue to hold lisinopril.  Continue amlodipine 5 mg twice daily.  -Holding parameters adjusted for systolic blood pressure less than 130.  -Recommend avoiding  hypotension or high fluctuations in blood pressure.    Transaminitis:  -Checking MRI and right upper quadrant ultrasound.  -Hepatitis panel and further workup per GI and primary care team.     Anemia:  -Prior SPEP/UPEP negative.  -Previous iron panel with normal iron and normal iron sat.  -Continue to monitor and transfuse as needed for hemoglobin less than 7.0.     Other: Diabetes, alcohol abuse, chronic pancreatitis, atrial fibrillation    Disposition:  Requiring additional stay due to medical needs.     SUBJECTIVE:  The patient reports feeling well this morning.  He denies any chest discomfort.  He reports having some back pain.  He denies nausea or vomiting.  He reports his diarrhea has improved today but he did have a lot of diarrhea yesterday.  He reports feeling hungry.    OBJECTIVE:  Current Weight: Weight - Scale: 60 kg (132 lb 4.4 oz)  Vitals:    03/11/24 1507 03/11/24 2345 03/12/24 0600 03/12/24 0659   BP: 128/65 139/71  144/78   BP Location: Right arm Right arm     Pulse: 69 74  68   Resp: 16 18  18   Temp: 98.6 °F (37 °C) 98.7 °F (37.1 °C)     TempSrc: Oral Oral     SpO2: 99% 98%  98%   Weight:   60 kg (132 lb 4.4 oz)    Height:         No intake or output data in the 24 hours ending 03/12/24 1056  General: NAD  Skin: warm, dry, intact, no rash  HEENT: Moist mucous membranes, sclera anicteric, normocephalic, atraumatic  Neck: No apparent JVD appreciated  Chest: lung sounds clear B/L, on RA   CVS:Regular rate and rhythm, no murmer   Abdomen: Soft, round, non-tender, +BS  Extremities: No B/L LE edema present  Neuro: alert and oriented, hard of hearing, glasses  Psych: appropriate mood and affect     Medications:    Current Facility-Administered Medications:     acetaminophen (TYLENOL) tablet 975 mg, 975 mg, Oral, Q8H Quorum HealthPhong MD, 975 mg at 03/12/24 0538    amLODIPine (NORVASC) tablet 5 mg, 5 mg, Oral, BID, ARIANE Reardon, 5 mg at 03/12/24 0810    chlorhexidine (PERIDEX) 0.12 % oral rinse  15 mL, 15 mL, Mouth/Throat, Q12H JAISON, THAO QuinonesNP, 15 mL at 03/11/24 2117    folic acid (FOLVITE) tablet 400 mcg, 400 mcg, Oral, Daily, ARIANE Quinones, 400 mcg at 03/12/24 0810    heparin (porcine) subcutaneous injection 5,000 Units, 5,000 Units, Subcutaneous, Q8H JAISON, ARIANE Quinones, 5,000 Units at 03/12/24 0538    insulin glargine (LANTUS) subcutaneous injection 5 Units 0.05 mL, 5 Units, Subcutaneous, QAM, Phong Sy MD, 5 Units at 03/12/24 0811    insulin lispro (HumALOG/ADMELOG) 100 units/mL subcutaneous injection 1-5 Units, 1-5 Units, Subcutaneous, TID AC, 1 Units at 03/12/24 0811 **AND** Fingerstick Glucose (POCT), , , TID AC, Phong Sy MD    lidocaine (LIDODERM) 5 % patch 1 patch, 1 patch, Topical, Daily, Phong Sy MD, 1 patch at 03/12/24 0812    methocarbamol (ROBAXIN) tablet 500 mg, 500 mg, Oral, Q6H PRN, Phong Sy MD, 500 mg at 03/11/24 2117    oxyCODONE (ROXICODONE) split tablet 2.5 mg, 2.5 mg, Oral, Q6H PRN, Phong Sy MD, 2.5 mg at 03/11/24 2117    pancrelipase (Lip-Prot-Amyl) (CREON) delayed release capsule 48,000 Units, 48,000 Units, Oral, TID With Meals, Igor Ramesh MD    thiamine tablet 100 mg, 100 mg, Oral, Daily, ARIANE Quinones, 100 mg at 03/12/24 0810    Laboratory Results:  Results from last 7 days   Lab Units 03/12/24  0515 03/11/24  0544 03/10/24  0524 03/09/24  0915 03/09/24  0512 03/08/24  0522   WBC Thousand/uL  --  10.00 9.51 11.65*  --  9.19   HEMOGLOBIN g/dL  --  10.0* 10.4* 9.8*  --  12.1   HEMATOCRIT %  --  29.2* 29.8* 28.1*  --  35.2*   PLATELETS Thousands/uL  --  218 202 181  --  229   SODIUM mmol/L 135 136 134*  --  136 137   POTASSIUM mmol/L 4.1 4.1 4.0  --  4.4 3.7   CHLORIDE mmol/L 109* 113* 110*  --  114* 109*   CO2 mmol/L 20* 19* 21  --  18* 21   BUN mg/dL 16 16 11  --  16 21   CREATININE mg/dL 1.67* 1.81* 1.69*  --  1.42* 1.38*   CALCIUM mg/dL 7.7* 7.6* 7.8*  --  7.5* 8.5   MAGNESIUM mg/dL 1.9 1.6*  --   --   1.7*  --    PHOSPHORUS mg/dL  --   --   --   --  2.7  --    ALK PHOS U/L 73  --   --   --   --  117*   ALT U/L 31  --   --   --   --  72*   AST U/L 21  --   --   --   --  50*

## 2024-03-12 NOTE — PHYSICAL THERAPY NOTE
PT EVALUATION    Pt. Name: Wes Araujo  Pt. Age: 54 y.o.  MRN: [id number]  LENGTH OF STAY: 4      Admitting Diagnoses:   Hypoglycemia [E16.2]  Hypothermia, initial encounter [T68.XXXA]  History of medical problems [Z87.898]    Past Medical History:   Diagnosis Date    Alcohol abuse     Chronic pancreatitis (HCC)     Diabetes mellitus (HCC)     Type 2    Pancreatitis     Paroxysmal A-fib (HCC)     Thrombocytopenia (HCC)        Past Surgical History:   Procedure Laterality Date    INCISION AND DRAINAGE OF WOUND N/A 8/16/2020    Procedure: INCISION AND DRAINAGE (I&D) HEAD/FACE;  Surgeon: Estrada Walton DMD;  [facility];  Service: Maxillofacial    IR BIOPSY BONE MARROW  2/7/2019    REMOVAL OF IMPACTED TOOTH - COMPLETELY BONY N/A 8/16/2020    Procedure: EXTRACTION TEETH MULTIPLE #2, 3;  Surgeon: Estrada Walton DMD;  [facility];  Service: Maxillofacial       Imaging Studies:  XR spine lumbar 2 or 3 views injury   Final Result by Lopez Winter MD (03/11 0707)      No acute osseous abnormality.      Findings are stable      Workstation performed: [id number]          right upper quadrant    (Results Pending)   MRI Inpatient Order    (Results Pending)         03/12/24 0954   PT Last Visit   PT Visit Date 03/12/24   Note Type   Note type Evaluation   Pain Assessment   Pain Score 10 - Worst Possible Pain   Pain Location/Orientation Location: Back   Hospital Pain Intervention(s) Repositioned;Ambulation/increased activity;Emotional support;Rest  (RN made aware)   Restrictions/Precautions   Weight Bearing Precautions Per Order No   Other Precautions Pain   Home Living   Type of Home Apartment  (2nd floor apartment)   Home Layout One level;Stairs to enter with rails;Other (Comment)  (FOS to 2nd floor apartment)   Bathroom Shower/Tub Tub/shower unit   Bathroom Toilet Standard   Bathroom Equipment   (no DME)   Home Equipment   (no DME)   Prior Function   Level of [place] Independent with  functional mobility;Independent with ADLs  (w/o AD)   Lives With Family  (parents)   Receives Help From Family   Falls in the last 6 months 0   Vocational Unemployed   Comments (-)    General   Family/Caregiver Present No   Cognition   Overall Cognitive Status WFL   Arousal/Participation Alert   Attention Attends with cues to redirect   Orientation Level Oriented to person;Oriented to place;Oriented to time   Following Commands Follows one step commands with increased time or repetition   Comments pt Kyrgyz speaking only; translation kiana for translation   Subjective   Subjective Pt somewhat irritated upon my arrival but agreeable to PT eval.   RUE Assessment   RUE Assessment WFL   LUE Assessment   LUE Assessment WFL   RLE Assessment   RLE Assessment WFL   LLE Assessment   LLE Assessment WFL   Coordination   Movements are Fluid and Coordinated 1   Sensation WFL   Bed Mobility   Supine to Sit 6  Modified independent   Additional items HOB elevated   Sit to Supine 6  Modified independent   Additional items HOB elevated   Transfers   Sit to Stand 6  Modified independent   Stand to Sit 6  Modified independent   Ambulation/Elevation   Gait pattern WNL;Wide ELLIOT   Gait Assistance 7  Independent   Assistive Device None   Distance >250'   Stair Management Assistance 6  Modified independent   Additional items Verbal cues   Stair Management Technique One rail R;Alternating pattern;Foreward;Reciprocal   Ambulation/Elevation Additional Comments no gross LOB noted; pt notes baseline gait   Balance   Static Sitting Normal   Dynamic Sitting Good   Static Standing Good   Dynamic Standing Fair +   Ambulatory Fair +   Endurance Deficit   Endurance Deficit No   Activity Tolerance   Activity Tolerance Patient tolerated treatment well   Nurse Made Aware yes, [place]   Assessment   Prognosis Good   Problem List Pain   Assessment Pt 54 y.o. male presented w/ change in mental status. Pt admitted for symptomatic Hypoglycemia,  transaminitis, acute renal failure & ETOH abuse. US abdomen pending. Pt referred to PT for mobility assessment & D/C planning. Please see flowsheet above re: home set-up, PLOF & objective findings during PT assessment. PTA, pt reports being I w/o AD.  On eval, pt demonstrates no significant decline in function to warrant skilled PT at this time. Pt  I/modified I w/ overall functional mobility including amb w/o AD & stair management. Balance & gait WFL.  Pt states being at his functional baseline and states no concerns about going back home when medically cleared. The patient's AM-PAC Basic Mobility Inpatient Short Form Raw Score is 24. A Raw score of greater than 16 suggests the patient may benefit from discharge to home. Please also refer to the recommendation of the Physical Therapist for safe discharge planning. From PT standpoint, pt may return home when medically cleared. Will D/C PT. Will maintain on restorative for daily amb to prevent decline in function. Nsg notified.   Barriers to Discharge None   Goals   Patient Goals none stated   PT Treatment Day 0   Plan   Treatment/Interventions Spoke to nursing  (PT eval only)   PT Frequency Other (Comment)  (D/C PT)   Discharge Recommendation   Rehab Resource Intensity Level, PT No post-acute rehabilitation needs   Additional Comments restorative for daily amb   AM-PAC Basic Mobility Inpatient   Turning in Flat Bed Without Bedrails 4   Lying on Back to Sitting on Edge of Flat Bed Without Bedrails 4   Moving Bed to Chair 4   Standing Up From Chair Using Arms 4   Walk in Room 4   Climb 3-5 Stairs With Railing 4   Basic Mobility Inpatient Raw Score 24   Basic Mobility Standardized Score 57.68   Highest Level Of Mobility   JH-HLM Goal 8: Walk 250 feet or more   JH-HLM Achieved 8: Walk 250 feet ot more   End of Consult   Patient Position at End of Consult Supine;All needs within reach   End of Consult Comments Pt in stable condition. All needs in reach.   Hx/personal  factors: co-morbidities, inaccessible home, dec caregiver support, and pain  Examination: pain, assessed body system, balance, endurance, amb, D/C disposition & fall risk  Clinical: unpredictable (ongoing medical status, abnormal lab values, imaging test/result pending, and pain mgt)  Complexity: high    Michele Kyleigh

## 2024-03-12 NOTE — CONSULTS
Consult Service: Gastroenterology      PATIENT INFORMATION      Wes Araujo 54 y.o. male MRN: [id number]  Unit/Bed#: E5 -01 Encounter: [id number]  PCP: Stuart Mata DO  Date of Admission:  3/8/2024  Date of Consultation: 03/12/24  Requesting Physician: Fannie Garcia *       ASSESSMENTS & PLAN   Wes Araujo is a 54 y.o. old male with PMH of alcohol use disorder, T2DM, CKD III, pAfib, chronic pancreatitis, HTN, polysubstance use disorder presenting with hypoglycemia s/p D10 gtt in ICU now on floors, with GI consulted for steatorrhea.     Steatorrhea in s/o Hx of Chronic Pancreatitis  Pt has longstanding history of steatorrhea and diet induced chronic diarrhea  Does not appear to have had a chronic diarrhea evaluation before  Check fecal calprotectin, giardia antigen  Check serum celiac panel  Etiology of chronic pancreatitis likely 2/2 significant alcohol use hx   Check IgG4 r/o autoimmune pancreatitis  Check triglyceride level  F/u abdominal ultrasound already ordered  Check MRCP now to evaluate pancreatic parenchyma, PD diameter, and r/o new development of lesions or masses  Start empiric creon TID w meals (42,000 units)  F/u GI outpt for uptitration    LFT Abnormalities  Transient LFT abnormalities on 3/8/24, now resolved   LFTs improved from AST 50 to 21, ALT 72 to 31, alk phos 117 to 73  F/u chronic hep panel  F/u abdominal ultrasound     REASON FOR CONSULTATION      Steatorrhea    HISTORY OF PRESENT ILLNESS      Wes Araujo is a 54 y.o. male with PMH of alcohol use disorder, T2DM, CKD III, pAfib, chronic pancreatitis, HTN, polysubstance use disorder presenting with hypoglycemia s/p D10 gtt in ICU now on floors, with GI consulted for steatorrhea. Interview conducted via Sen[place]e  ID [id number].      Drinks 6 beers daily. Last drink 3/7/24. Longstanding changes of chronic pancreatitis, imaging reports note calcifications since at least 8/3/15. Now endorsing  steatorrhea. Per pt, has had issues with diarrhea after eating fatty foods since childhood.     On interview denies weight loss, FH of pancreatitis, FH of pancreatic cancer. Mild abdominal pain.     States his main concern is ability to continue eating fatty foods, as these are a staple of his diet. Discussed w patient that given longstanding history of chronic pancreatitis, he may have issues with this diet. Patient has never been on pancreatic enzyme supplementation before.     REVIEW OF SYSTEMS     A thorough 12-point review of systems has been conducted. Pertinent positives and negatives are mentioned in the history of present illness.       PAST MEDICAL & SURGICAL HISTORY      Past Medical History:   Diagnosis Date    Alcohol abuse     Chronic pancreatitis (HCC)     Diabetes mellitus (HCC)     Type 2    Pancreatitis     Paroxysmal A-fib (HCC)     Thrombocytopenia (HCC)        Past Surgical History:   Procedure Laterality Date    INCISION AND DRAINAGE OF WOUND N/A 8/16/2020    Procedure: INCISION AND DRAINAGE (I&D) HEAD/FACE;  Surgeon: Estrada Walton DMD;  [facility];  Service: Maxillofacial    IR BIOPSY BONE MARROW  2/7/2019    REMOVAL OF IMPACTED TOOTH - COMPLETELY BONY N/A 8/16/2020    Procedure: EXTRACTION TEETH MULTIPLE #2, 3;  Surgeon: Estrada Walton DMD;  [facility];  Service: Maxillofacial         MEDICATIONS & ALLERGIES       Medications:   Prior to Admission medications    Medication Sig Start Date End Date Taking? Authorizing Provider   Alcohol Swabs 70 % PADS May substitute brand based on insurance coverage. Check glucose TID. 1/27/24   Julio Mcneil MD   Blood Glucose Monitoring Suppl (OneTouch Verio Reflect) w/Device KIT May substitute brand based on insurance coverage. Check glucose TID. 1/27/24   Julio Mcneil MD   dulaglutide (Trulicity) 0.75 MG/0.5ML injection Inject 0.5 mL (0.75 mg total) under the skin every 7 days 2/12/24   Stuart Mata, DO   Empagliflozin 25 MG  "TABS Take 1 tablet (25 mg total) by mouth daily 2/12/24 8/10/24  Stuart Mata,    glucose blood (OneTouch Verio) test strip May substitute brand based on insurance coverage. Check glucose TID. 1/27/24   Julio Mcneil MD   Insulin Pen Needle (BD Pen Needle Claudia 2nd Gen) 32G X 4 MM MISC For use with insulin pen. Pharmacy may dispense brand covered by insurance. 1/27/24   Julio Mcneil MD   Insulin Pen Needle (BD Pen Needle Claudia 2nd Gen) 32G X 4 MM MISC For use with insulin pen. Pharmacy may dispense brand covered by insurance. 1/27/24   Julio Mcneil MD   Lancets (accu-chek multiclix) lancets Check blood sugar 3 times a day  Patient not taking: Reported on 1/25/2024 10/17/23   Isma Mejia MD   lisinopril (ZESTRIL) 10 mg tablet Take 1 tablet (10 mg total) by mouth daily 1/28/24 2/27/24  Julio Mcneil MD   Multiple Vitamin (multivitamin) tablet Take 1 tablet by mouth daily 1/27/24 2/26/24  Julio Mcneil MD   OneTouch Delica Lancets 33G MISC May substitute brand based on insurance coverage. Check glucose TID. 1/27/24   Julio Mcneil MD       Allergies: No Known Allergies      SOCIAL HISTORY      Marital Status: Single    Substance Use History:   Social History     Substance and Sexual Activity   Alcohol Use Yes     Social History     Tobacco Use   Smoking Status Former    Passive exposure: Past   Smokeless Tobacco Never     Social History     Substance and Sexual Activity   Drug Use Yes    Types: Cocaine         FAMILY HISTORY      Non-Contributory      PHYSICAL EXAM     Vitals:   Blood Pressure: 144/78 (03/12/24 0659)  Pulse: 68 (03/12/24 0659)  Temperature: 98.7 °F (37.1 °C) (03/11/24 2345)  Temp Source: Oral (03/11/24 2345)  Respirations: 18 (03/12/24 0659)  Height: 5' 9\" (175.3 cm) (03/08/24 0931)  Weight - Scale: 60 kg (132 lb 4.4 oz) (03/12/24 0600)  SpO2: 98 % (03/12/24 0659)    Physical Exam:   GENERAL: NAD  HEENT:  NC/AT, No Scleral Icterus  CARDIAC:  Regular Rate  PULMONARY:  Non-Labored " Breathing, No Respiratory Distress  ABDOMEN:  Soft, No Rebound/Guarding/Rigidity, No Organomegaly, Mild Tenderness  EXTREMITIES:  No Edema, Cyanosis, or Clubbing  NEUROLOGIC:  Alert  SKIN:  No Rashes or Erythema    ADDITIONAL DATA     Lab Results:       Lab Units 03/12/24  0515 03/11/24  0544 03/10/24  0524 03/09/24  0512 03/08/24  0522 02/03/24  0444 02/02/24  0429 02/01/24 2032 01/26/24  0840 01/26/24  0506 10/17/23  0122 02/10/23  1506 12/08/22  1143 11/24/22  0438   SODIUM mmol/L 135 136 134* 136 137   < > 139 140   < > 136   < > 137   < > 137   POTASSIUM mmol/L 4.1 4.1 4.0 4.4 3.7   < > 3.2* 2.7*   < > 3.8   < > 3.3*   < > 4.1   CHLORIDE mmol/L 109* 113* 110* 114* 109*   < > 112* 111*   < > 105   < > 108   < > 103   CO2 mmol/L 20* 19* 21 18* 21   < > 23 23   < > 28   < > 24   < > 27   BUN mg/dL 16 16 11 16 21   < > 9 9   < > 8   < > 12   < > 11   CREATININE mg/dL 1.67* 1.81* 1.69* 1.42* 1.38*   < > 1.12 1.03   < > 0.90   < > 1.02   < > 1.08   GLUCOSE RANDOM mg/dL 205* 102 223* 134 81   < > 41* 25*   < > 200*   < > 231*   < > 265*   CALCIUM mg/dL 7.7* 7.6* 7.8* 7.5* 8.5   < > 7.8* 8.0*   < > 8.7   < > 7.7*   < > 8.2*   MAGNESIUM mg/dL 1.9 1.6*  --  1.7*  --   --  2.3 1.8*  --  1.9   < > 1.4*   < > 1.8   PHOSPHORUS mg/dL  --   --   --  2.7  --   --   --   --   --  1.6*  --  3.7  --  3.2    < > = values in this interval not displayed.            Lab Units 03/12/24  0515 03/08/24  0522 02/01/24  2032 01/25/24  1010 10/17/23  0122   TOTAL PROTEIN g/dL 5.2* 6.5 5.4* 6.4 6.7   ALBUMIN g/dL 2.8* 3.7 3.0* 3.6 3.7   TOTAL BILIRUBIN mg/dL 0.25 0.33 0.58 0.54 0.47   AST U/L 21 50* 18 32 24   ALT U/L 31 72* 22 56* 29   ALK PHOS U/L 73 117* 51 103 81           Lab Units 03/11/24  0544 03/10/24  0524 03/09/24  0915 03/08/24  0522 02/03/24  0444   WBC Thousand/uL 10.00 9.51 11.65* 9.19 13.30*   HEMOGLOBIN g/dL 10.0* 10.4* 9.8* 12.1 7.7*   HEMATOCRIT % 29.2* 29.8* 28.1* 35.2* 22.6*   PLATELETS Thousands/uL 218 202 181 229 246    MCV fL 91 90 90 90 91       Lab Results   Component Value Date    IRON 83 02/02/2024    TIBC 189 (L) 02/02/2024    FERRITIN 1,375 (H) 02/05/2019       Lab Results   Component Value Date    INR 0.97 03/08/2024    INR 1.07 10/17/2023    INR 1.09 02/10/2023    PROTIME 13.1 03/08/2024    PROTIME 13.9 10/17/2023    PROTIME 14.1 02/10/2023         Microbiology Results:        Imaging:  Procedure: XR spine lumbar 2 or 3 views injury    Result Date: 3/11/2024  Narrative: XR SPINE LUMBAR 2 OR 3 VIEWS INJURY INDICATION: low back pain. COMPARISON: None FINDINGS: No acute fracture. Intact pedicles. Five non-rib-bearing lumbar vertebral bodies. Normal alignment. No significant degenerative changes. No evidence of spondylolysis. Pancreatic calcifications     Impression: No acute osseous abnormality. Findings are stable Workstation performed: [id number]     Narrative/Impressions - 3 day look back     EKG, Pathology, and Other Studies Reviewed on Admission:   EKG: Reviewed    Counseling / Coordination of Care Time: 30 total mins spent n consult. Greater than 50% of total time spent on patient counseling and coordination of care.    ...............................................................................................................................................  Igor Ramesh M.D.  PGY-5 Gastroenterology Fellow  [facility]  Available on [email or web address]  [email or web address]  Recommendations not final until attending attestation

## 2024-03-12 NOTE — ASSESSMENT & PLAN NOTE
Lab Results   Component Value Date    EGFR 45 03/12/2024    EGFR 41 03/11/2024    EGFR 45 03/10/2024    CREATININE 1.67 (H) 03/12/2024    CREATININE 1.81 (H) 03/11/2024    CREATININE 1.69 (H) 03/10/2024     Creatinine peaked at 1.81, now downtrending  Nephrology consulted, recommendations appreciated  I&O monitoring  Avoid nephrotoxins

## 2024-03-12 NOTE — ASSESSMENT & PLAN NOTE
Reportedly drinks up to 6 beers daily  History of alcohol withdrawals, last drink reported 03/07  [facility] protocol  Continue thiamine and folate supplementation

## 2024-03-12 NOTE — PLAN OF CARE
Problem: Potential for Falls  Goal: Patient will remain free of falls  Description: INTERVENTIONS:  - Educate patient/family on patient safety including physical limitations  - Instruct patient to call for assistance with activity   - Consult OT/PT to assist with strengthening/mobility   - Keep Call bell within reach  - Keep bed low and locked with side rails adjusted as appropriate  - Keep care items and personal belongings within reach  - Initiate and maintain comfort rounds  - Make Fall Risk Sign visible to staff  - Apply yellow socks and bracelet for high fall risk patients  - Consider moving patient to room near nurses station  3/11/2024 2131 by Marimar Braden RN  Outcome: Progressing  3/11/2024 2131 by Marimar Braden RN  Outcome: Progressing     Problem: PAIN - ADULT  Goal: Verbalizes/displays adequate comfort level or baseline comfort level  Description: Interventions:  - Encourage patient to monitor pain and request assistance  - Assess pain using appropriate pain scale  - Administer analgesics based on type and severity of pain and evaluate response  - Implement non-pharmacological measures as appropriate and evaluate response  - Consider cultural and social influences on pain and pain management  - Notify physician/advanced practitioner if interventions unsuccessful or patient reports new pain  3/11/2024 2131 by Marimar Braden RN  Outcome: Progressing  3/11/2024 2131 by Marimar Braden RN  Outcome: Progressing     Problem: INFECTION - ADULT  Goal: Absence or prevention of progression during hospitalization  Description: INTERVENTIONS:  - Assess and monitor for signs and symptoms of infection  - Monitor lab/diagnostic results  - Monitor all insertion sites, i.e. indwelling lines, tubes, and drains  - Monitor endotracheal if appropriate and nasal secretions for changes in amount and color  - [place] appropriate cooling/warming therapies per order  - Administer medications  as ordered  - Instruct and encourage patient and family to use good hand hygiene technique  - Identify and instruct in appropriate isolation precautions for identified infection/condition  3/11/2024 2131 by Marimar Braden RN  Outcome: Progressing  3/11/2024 2131 by Marimar Braden RN  Outcome: Progressing  Goal: Absence of fever/infection during neutropenic period  Description: INTERVENTIONS:  - Monitor WBC    3/11/2024 2131 by Marimar Braden RN  Outcome: Progressing  3/11/2024 2131 by Marimar Braden RN  Outcome: Progressing     Problem: SAFETY ADULT  Goal: Patient will remain free of falls  Description: INTERVENTIONS:  - Educate patient/family on patient safety including physical limitations  - Instruct patient to call for assistance with activity   - Consult OT/PT to assist with strengthening/mobility   - Keep Call bell within reach  - Keep bed low and locked with side rails adjusted as appropriate  - Keep care items and personal belongings within reach  - Initiate and maintain comfort rounds  - Make Fall Risk Sign visible to staff  - Apply yellow socks and bracelet for high fall risk patients  - Consider moving patient to room near nurses station  3/11/2024 2131 by Marimar Braden RN  Outcome: Progressing  3/11/2024 2131 by Marimar Braden RN  Outcome: Progressing  Goal: Maintain or return to baseline ADL function  Description: INTERVENTIONS:  - Educate patient/family on patient safety including physical limitations  - Instruct patient to call for assistance with activity   - Consult OT/PT to assist with strengthening/mobility   - Keep Call bell within reach  - Keep bed low and locked with side rails adjusted as appropriate  - Keep care items and personal belongings within reach  - Initiate and maintain comfort rounds  - Make Fall Risk Sign visible to staff  - Apply yellow socks and bracelet for high fall risk patients  - Consider moving patient to room near nurses  station  3/11/2024 2131 by Marimar Braden RN  Outcome: Progressing  3/11/2024 2131 by Marimar Braden RN  Outcome: Progressing  Goal: Maintains/Returns to pre admission functional level  Description: INTERVENTIONS:  - Perform AM-PAC 6 Click Basic Mobility/ Daily Activity assessment daily.  - Set and communicate daily mobility goal to care team and patient/family/caregiver.   - Collaborate with rehabilitation services on mobility goals if consulted  - Out of bed for toileting  - Record patient progress and toleration of activity level   3/11/2024 2131 by Marimar Braden RN  Outcome: Progressing  3/11/2024 2131 by Marimar Braden RN  Outcome: Progressing     Problem: DISCHARGE PLANNING  Goal: Discharge to home or other facility with appropriate resources  Description: INTERVENTIONS:  - Identify barriers to discharge w/patient and caregiver  - Arrange for needed discharge resources and transportation as appropriate  - Identify discharge learning needs (meds, wound care, etc.)  - Arrange for interpretive services to assist at discharge as needed  - Refer to Case Management Department for coordinating discharge planning if the patient needs post-hospital services based on physician/advanced practitioner order or complex needs related to functional status, cognitive ability, or social support system  3/11/2024 2131 by Marimar Braden RN  Outcome: Progressing  3/11/2024 2131 by Marimar Braden RN  Outcome: Progressing     Problem: Knowledge Deficit  Goal: Patient/family/caregiver demonstrates understanding of disease process, treatment plan, medications, and discharge instructions  Description: Complete learning assessment and assess knowledge base.  Interventions:  - Provide teaching at level of understanding  - Provide teaching via preferred learning methods  3/11/2024 2131 by Marimar Braden RN  Outcome: Progressing  3/11/2024 2131 by Marimar Braden RN  Outcome:  Progressing

## 2024-03-12 NOTE — ASSESSMENT & PLAN NOTE
Noted on admission, now normalized  Suspect secondary to chronic EtOH use  RUQ reviewed  F/u MRI ordered by GI  F/u hepatitis panel  GI following, encouraged alcohol cessation

## 2024-03-13 ENCOUNTER — APPOINTMENT (INPATIENT)
Dept: ULTRASOUND IMAGING | Facility: HOSPITAL | Age: 54
DRG: 469 | End: 2024-03-13
Payer: COMMERCIAL

## 2024-03-13 LAB
ANION GAP SERPL CALCULATED.3IONS-SCNC: 4 MMOL/L (ref 4–13)
BUN SERPL-MCNC: 24 MG/DL (ref 5–25)
CALCIUM SERPL-MCNC: 8.1 MG/DL (ref 8.4–10.2)
CHLORIDE SERPL-SCNC: 105 MMOL/L (ref 96–108)
CO2 SERPL-SCNC: 26 MMOL/L (ref 21–32)
CREAT SERPL-MCNC: 1.72 MG/DL (ref 0.6–1.3)
GFR SERPL CREATININE-BSD FRML MDRD: 44 ML/MIN/1.73SQ M
GLUCOSE SERPL-MCNC: 167 MG/DL (ref 65–140)
GLUCOSE SERPL-MCNC: 170 MG/DL (ref 65–140)
GLUCOSE SERPL-MCNC: 200 MG/DL (ref 65–140)
GLUCOSE SERPL-MCNC: 213 MG/DL (ref 65–140)
GLUCOSE SERPL-MCNC: 224 MG/DL (ref 65–140)
GLUCOSE SERPL-MCNC: 232 MG/DL (ref 65–140)
POTASSIUM SERPL-SCNC: 4.2 MMOL/L (ref 3.5–5.3)
SODIUM SERPL-SCNC: 135 MMOL/L (ref 135–147)

## 2024-03-13 PROCEDURE — 82948 REAGENT STRIP/BLOOD GLUCOSE: CPT

## 2024-03-13 PROCEDURE — 99232 SBSQ HOSP IP/OBS MODERATE 35: CPT | Performed by: STUDENT IN AN ORGANIZED HEALTH CARE EDUCATION/TRAINING PROGRAM

## 2024-03-13 PROCEDURE — 99232 SBSQ HOSP IP/OBS MODERATE 35: CPT | Performed by: INTERNAL MEDICINE

## 2024-03-13 PROCEDURE — 80048 BASIC METABOLIC PNL TOTAL CA: CPT | Performed by: INTERNAL MEDICINE

## 2024-03-13 PROCEDURE — 76775 US EXAM ABDO BACK WALL LIM: CPT

## 2024-03-13 RX ADMIN — HYDRALAZINE HYDROCHLORIDE 25 MG: 25 TABLET ORAL at 21:40

## 2024-03-13 RX ADMIN — ACETAMINOPHEN 325MG 975 MG: 325 TABLET ORAL at 13:30

## 2024-03-13 RX ADMIN — HYDRALAZINE HYDROCHLORIDE 25 MG: 25 TABLET ORAL at 13:29

## 2024-03-13 RX ADMIN — PANCRELIPASE 48000 UNITS: 24000; 76000; 120000 CAPSULE, DELAYED RELEASE PELLETS ORAL at 17:17

## 2024-03-13 RX ADMIN — INSULIN LISPRO 1 UNITS: 100 INJECTION, SOLUTION INTRAVENOUS; SUBCUTANEOUS at 17:14

## 2024-03-13 RX ADMIN — HEPARIN SODIUM 5000 UNITS: 5000 INJECTION INTRAVENOUS; SUBCUTANEOUS at 13:30

## 2024-03-13 RX ADMIN — ACETAMINOPHEN 325MG 975 MG: 325 TABLET ORAL at 05:51

## 2024-03-13 RX ADMIN — INSULIN GLARGINE 5 UNITS: 100 INJECTION, SOLUTION SUBCUTANEOUS at 08:07

## 2024-03-13 RX ADMIN — HEPARIN SODIUM 5000 UNITS: 5000 INJECTION INTRAVENOUS; SUBCUTANEOUS at 21:38

## 2024-03-13 RX ADMIN — PANCRELIPASE 48000 UNITS: 24000; 76000; 120000 CAPSULE, DELAYED RELEASE PELLETS ORAL at 11:45

## 2024-03-13 RX ADMIN — FOLIC ACID TAB 400 MCG 400 MCG: 400 TAB at 08:07

## 2024-03-13 RX ADMIN — INSULIN LISPRO 1 UNITS: 100 INJECTION, SOLUTION INTRAVENOUS; SUBCUTANEOUS at 08:02

## 2024-03-13 RX ADMIN — ACETAMINOPHEN 325MG 975 MG: 325 TABLET ORAL at 21:38

## 2024-03-13 RX ADMIN — LIDOCAINE 5% 1 PATCH: 700 PATCH TOPICAL at 08:07

## 2024-03-13 RX ADMIN — HEPARIN SODIUM 5000 UNITS: 5000 INJECTION INTRAVENOUS; SUBCUTANEOUS at 05:02

## 2024-03-13 RX ADMIN — INSULIN LISPRO 2 UNITS: 100 INJECTION, SOLUTION INTRAVENOUS; SUBCUTANEOUS at 11:45

## 2024-03-13 RX ADMIN — THIAMINE HCL TAB 100 MG 100 MG: 100 TAB at 08:07

## 2024-03-13 RX ADMIN — PANCRELIPASE 48000 UNITS: 24000; 76000; 120000 CAPSULE, DELAYED RELEASE PELLETS ORAL at 08:03

## 2024-03-13 RX ADMIN — CHLORHEXIDINE GLUCONATE 15 ML: 1.2 RINSE ORAL at 21:38

## 2024-03-13 RX ADMIN — CHLORHEXIDINE GLUCONATE 15 ML: 1.2 RINSE ORAL at 08:07

## 2024-03-13 RX ADMIN — AMLODIPINE BESYLATE 5 MG: 5 TABLET ORAL at 17:17

## 2024-03-13 RX ADMIN — HYDRALAZINE HYDROCHLORIDE 25 MG: 25 TABLET ORAL at 05:02

## 2024-03-13 RX ADMIN — AMLODIPINE BESYLATE 5 MG: 5 TABLET ORAL at 08:07

## 2024-03-13 NOTE — ASSESSMENT & PLAN NOTE
Noted on admission, now normalized  Suspect secondary to chronic EtOH use  RUQ reviewed  Mri showing chronic pancreatitis  Chronic hepatitis panel negative  GI following, encouraged alcohol cessation

## 2024-03-13 NOTE — ASSESSMENT & PLAN NOTE
Lab Results   Component Value Date    EGFR 44 03/13/2024    EGFR 45 03/12/2024    EGFR 41 03/11/2024    CREATININE 1.72 (H) 03/13/2024    CREATININE 1.67 (H) 03/12/2024    CREATININE 1.81 (H) 03/11/2024     Creatinine with no significant improvement, if stable tomorrow can be discharged with outpatient Nephro follow-up  Nephrology consulted, recommendations appreciated  I&O monitoring  Avoid nephrotoxins  Renal ultrasound showing Increased renal cortical echogenicity suggestive of medical renal disease.

## 2024-03-13 NOTE — PLAN OF CARE
Problem: Potential for Falls  Goal: Patient will remain free of falls  Description: INTERVENTIONS:  - Educate patient/family on patient safety including physical limitations  - Instruct patient to call for assistance with activity   - Consult OT/PT to assist with strengthening/mobility   - Keep Call bell within reach  - Keep bed low and locked with side rails adjusted as appropriate  - Keep care items and personal belongings within reach  - Initiate and maintain comfort rounds  - Make Fall Risk Sign visible to staff  - Apply yellow socks and bracelet for high fall risk patients  - Consider moving patient to room near nurses station  Outcome: Progressing     Problem: PAIN - ADULT  Goal: Verbalizes/displays adequate comfort level or baseline comfort level  Description: Interventions:  - Encourage patient to monitor pain and request assistance  - Assess pain using appropriate pain scale  - Administer analgesics based on type and severity of pain and evaluate response  - Implement non-pharmacological measures as appropriate and evaluate response  - Consider cultural and social influences on pain and pain management  - Notify physician/advanced practitioner if interventions unsuccessful or patient reports new pain  Outcome: Progressing     Problem: INFECTION - ADULT  Goal: Absence or prevention of progression during hospitalization  Description: INTERVENTIONS:  - Assess and monitor for signs and symptoms of infection  - Monitor lab/diagnostic results  - Monitor all insertion sites, i.e. indwelling lines, tubes, and drains  - Monitor endotracheal if appropriate and nasal secretions for changes in amount and color  - [place] appropriate cooling/warming therapies per order  - Administer medications as ordered  - Instruct and encourage patient and family to use good hand hygiene technique  - Identify and instruct in appropriate isolation precautions for identified infection/condition  Outcome: Progressing  Goal: Absence  of fever/infection during neutropenic period  Description: INTERVENTIONS:  - Monitor WBC    Outcome: Progressing     Problem: SAFETY ADULT  Goal: Patient will remain free of falls  Description: INTERVENTIONS:  - Educate patient/family on patient safety including physical limitations  - Instruct patient to call for assistance with activity   - Consult OT/PT to assist with strengthening/mobility   - Keep Call bell within reach  - Keep bed low and locked with side rails adjusted as appropriate  - Keep care items and personal belongings within reach  - Initiate and maintain comfort rounds  - Make Fall Risk Sign visible to staff  - Apply yellow socks and bracelet for high fall risk patients  - Consider moving patient to room near nurses station  Outcome: Progressing  Goal: Maintain or return to baseline ADL function  Description: INTERVENTIONS:  - Educate patient/family on patient safety including physical limitations  - Instruct patient to call for assistance with activity   - Consult OT/PT to assist with strengthening/mobility   - Keep Call bell within reach  - Keep bed low and locked with side rails adjusted as appropriate  - Keep care items and personal belongings within reach  - Initiate and maintain comfort rounds  - Make Fall Risk Sign visible to staff  - Apply yellow socks and bracelet for high fall risk patients  - Consider moving patient to room near nurses station  Outcome: Progressing  Goal: Maintains/Returns to pre admission functional level  Description: INTERVENTIONS:  - Perform AM-PAC 6 Click Basic Mobility/ Daily Activity assessment daily.  - Set and communicate daily mobility goal to care team and patient/family/caregiver.   - Collaborate with rehabilitation services on mobility goals if consulted  - Out of bed for toileting  - Record patient progress and toleration of activity level   Outcome: Progressing     Problem: DISCHARGE PLANNING  Goal: Discharge to home or other facility with appropriate  resources  Description: INTERVENTIONS:  - Identify barriers to discharge w/patient and caregiver  - Arrange for needed discharge resources and transportation as appropriate  - Identify discharge learning needs (meds, wound care, etc.)  - Arrange for interpretive services to assist at discharge as needed  - Refer to Case Management Department for coordinating discharge planning if the patient needs post-hospital services based on physician/advanced practitioner order or complex needs related to functional status, cognitive ability, or social support system  Outcome: Progressing     Problem: Knowledge Deficit  Goal: Patient/family/caregiver demonstrates understanding of disease process, treatment plan, medications, and discharge instructions  Description: Complete learning assessment and assess knowledge base.  Interventions:  - Provide teaching at level of understanding  - Provide teaching via preferred learning methods  Outcome: Progressing

## 2024-03-13 NOTE — ASSESSMENT & PLAN NOTE
Reportedly drinks up to 6 beers daily  History of alcohol withdrawals, last drink reported 03/07  [facility] protocol  Continue thiamine and folate supplementation

## 2024-03-13 NOTE — PROGRESS NOTES
NEPHROLOGY PROGRESS NOTE   Wes Araujo 54 y.o. male MRN: [id number]  Unit/Bed#: E5 -01 Encounter: [id number]      HPI/24hr EVENTS:    -54-year-old male past medical history of DM2, hypertension, EtOH use, medical noncompliance.  Presented with altered mental status.  Nephrology consulted management of ZENAIDA    -Mild creatinine increase    ASSESSMENT/PLAN:    ZENAIDA  -Baseline creatinine: 0.8-1.1  -Creatinine on admission 1.38, creatinine peaking at 1.81 most recently 1.72 from 1.67 yesterday  -Etiology: Suspect multifactorial, ATN from hypovolemia and concurrent ACE inhibitor use  -UA: Glucose, 1+ protein, no RBCs, no blood, 0-3 hyaline casts  -MRI abdomen from 3/12/2024 with no hydroureteronephrosis no suspicious renal mass  -Underwent IV fluid resuscitation, currently off all IV fluids  -Diarrhea has resolved  -Continue holding lisinopril  -Monitor for improvement in renal function  -Urinary retention protocol/check bladder scan    Non-anion gap metabolic acidosis  -Due to GI losses with diarrhea versus ZENAIDA, now resolved with recent bicarb level 26 after bicarb drip    Hypertension  -Currently on Norvasc 5 mg twice daily, hydralazine 25 mg 3 times daily  -Recent blood pressures are acceptable    Secondary hyperparathyroidism  -PTH is 145, vitamin D 25-hydroxy is less than 7  -Suspect due to severe vitamin D deficiency, started on vitamin D 50,000 units weekly    Anemia  -Recent hemoglobin is 10  -Prior paraproteinemia workup was negative    Diarrhea/abnormal LFTs  -GI consulted on admission    Addition medical problems: EtOH use, DM 2, chronic pancreatitis    Discussed with patient via [place] phone  service    SUBJECTIVE:  No acute complaints reported as diarrhea has resolved, he is eating and drinking    ROS:  Review of Systems   Constitutional: Negative.    Respiratory: Negative.     Cardiovascular: Negative.    Gastrointestinal:  Negative for diarrhea.   Genitourinary: Negative.    Neurological:  Negative.       A complete 10 point review of systems was performed and found to be negative unless otherwise noted above or in the HPI.    OBJECTIVE:  Current Weight: Weight - Scale: 59.2 kg (130 lb 8.2 oz)  Vitals:    03/13/24 0501 03/13/24 0546 03/13/24 0804 03/13/24 1327   BP: 145/79  162/82 123/64   BP Location:       Pulse: 72  85 83   Resp:       Temp:       TempSrc:       SpO2: 99%  99% 98%   Weight:  59.2 kg (130 lb 8.2 oz)     Height:           Intake/Output Summary (Last 24 hours) at 3/13/2024 1339  Last data filed at 3/13/2024 1301  Gross per 24 hour   Intake --   Output 350 ml   Net -350 ml     Physical Exam  Vitals reviewed.   Constitutional:       General: He is not in acute distress.     Appearance: He is not toxic-appearing or diaphoretic.   HENT:      Head: Normocephalic and atraumatic.      Nose: Nose normal.      Mouth/Throat:      Mouth: Mucous membranes are moist.   Eyes:      General: No scleral icterus.  Cardiovascular:      Rate and Rhythm: Normal rate.      Pulses: Normal pulses.   Pulmonary:      Effort: Pulmonary effort is normal. No respiratory distress.      Breath sounds: No wheezing or rales.   Abdominal:      General: Abdomen is flat.   Musculoskeletal:      Cervical back: Neck supple.      Right lower leg: No edema.      Left lower leg: No edema.   Skin:     General: Skin is warm and dry.      Capillary Refill: Capillary refill takes less than 2 seconds.   Neurological:      Mental Status: He is alert and oriented to person, place, and time.          Medications:    Current Facility-Administered Medications:     acetaminophen (TYLENOL) tablet 975 mg, 975 mg, Oral, Q8H [facility], Phong Sy MD, 975 mg at 03/13/24 1330    amLODIPine (NORVASC) tablet 5 mg, 5 mg, Oral, BID, ARIANE Reardon, 5 mg at 03/13/24 0807    chlorhexidine (PERIDEX) 0.12 % oral rinse 15 mL, 15 mL, Mouth/Throat, Q12H [facility], ARIANE Quinones, 15 mL at 03/13/24 0807    ergocalciferol (VITAMIN D2) capsule  50,000 Units, 50,000 Units, Oral, Weekly, ARIANE Reardon, 50,000 Units at 03/12/24 1336    heparin (porcine) subcutaneous injection 5,000 Units, 5,000 Units, Subcutaneous, Q8H [facility], ARIANE Quinones, 5,000 Units at 03/13/24 1330    hydrALAZINE (APRESOLINE) tablet 25 mg, 25 mg, Oral, Q8H [facility], Fannie Hernandez MD, 25 mg at 03/13/24 1329    insulin glargine (LANTUS) subcutaneous injection 5 Units 0.05 mL, 5 Units, Subcutaneous, QAM, Phong Sy MD, 5 Units at 03/13/24 0807    insulin lispro (HumALOG/ADMELOG) 100 units/mL subcutaneous injection 1-5 Units, 1-5 Units, Subcutaneous, TID AC, 2 Units at 03/13/24 1145 **AND** Fingerstick Glucose (POCT), , , TID AC, Phong Sy MD    lidocaine (LIDODERM) 5 % patch 1 patch, 1 patch, Topical, Daily, Phong Sy MD, 1 patch at 03/13/24 0807    methocarbamol (ROBAXIN) tablet 500 mg, 500 mg, Oral, Q6H PRN, Phong Sy MD, 500 mg at 03/11/24 2117    oxyCODONE (ROXICODONE) split tablet 2.5 mg, 2.5 mg, Oral, Q6H PRN, Phong Sy MD, 2.5 mg at 03/11/24 2117    pancrelipase (Lip-Prot-Amyl) (CREON) delayed release capsule 48,000 Units, 48,000 Units, Oral, TID With Meals, Igor Ramesh MD, 48,000 Units at 03/13/24 1145    Laboratory Results:  Results from last 7 days   Lab Units 03/13/24  0458 03/12/24  0515 03/11/24  0544 03/10/24  0524 03/09/24  0915 03/09/24  0512 03/08/24  0522   WBC Thousand/uL  --   --  10.00 9.51 11.65*  --  9.19   HEMOGLOBIN g/dL  --   --  10.0* 10.4* 9.8*  --  12.1   HEMATOCRIT %  --   --  29.2* 29.8* 28.1*  --  35.2*   PLATELETS Thousands/uL  --   --  218 202 181  --  229   POTASSIUM mmol/L 4.2 4.1 4.1 4.0  --  4.4 3.7   CHLORIDE mmol/L 105 109* 113* 110*  --  114* 109*   CO2 mmol/L 26 20* 19* 21  --  18* 21   BUN mg/dL 24 16 16 11  --  16 21   CREATININE mg/dL 1.72* 1.67* 1.81* 1.69*  --  1.42* 1.38*   CALCIUM mg/dL 8.1* 7.7* 7.6* 7.8*  --  7.5* 8.5   MAGNESIUM mg/dL  --  1.9 1.6*  --   --  1.7*  --     PHOSPHORUS mg/dL  --   --   --   --   --  2.7  --        I have personally reviewed the blood work as stated above and in my note.  I personally reviewed recent MRI imaging  I have personally reviewed internal medicine, GI note.

## 2024-03-13 NOTE — ASSESSMENT & PLAN NOTE
Patient complaining of chronic steatorrhea which is likely from pancreatic insufficiency given chronic pancreatitis in setting of EtOH abuse  RUQ ultrasound showing: Dilatation of the pancreatic duct up to 6 mm that appears chronic. Calcification in the region of the pancreatic duct in keeping with history of chronic pancreatitis.   GI recommendations appreciated  F/u fecal elastase  Continue Creon 48,000 units with each meal and 24,000 units with snacks  F/u other labs for chronic diarrhea as per GI  Outpatient GI follow-up for titration of Creon  Will need screening colonoscopy as outpatient

## 2024-03-13 NOTE — PROGRESS NOTES
[facility]  Progress Note  Name: Wes Araujo I  MRN: [id number]  Unit/Bed#: E5 -01 I Date of Admission: 3/8/2024   Date of Service: 3/13/2024 I Hospital Day: 5    Assessment/Plan   * Hypoglycemia  Assessment & Plan  Admitted to the ICU, reportedly self administered an inappropriate dose of Lantus insulin 20 units  Had multiple prior hospital admissions for hypoglycemia  Treated with D5, with subsequent blood glucose checks improving  Doing well on Lantus at decreased dose of 5 units in a.m.   continue sliding scale and Accu-Cheks  Monitor blood glucose   Hypoglycemia protocol    Acute renal failure superimposed on stage 3 chronic kidney disease (HCC)  Assessment & Plan  Lab Results   Component Value Date    EGFR 44 03/13/2024    EGFR 45 03/12/2024    EGFR 41 03/11/2024    CREATININE 1.72 (H) 03/13/2024    CREATININE 1.67 (H) 03/12/2024    CREATININE 1.81 (H) 03/11/2024     Creatinine with no significant improvement, if stable tomorrow can be discharged with outpatient Nephro follow-up  Nephrology consulted, recommendations appreciated  I&O monitoring  Avoid nephrotoxins  Renal ultrasound showing Increased renal cortical echogenicity suggestive of medical renal disease.     Alcohol-induced chronic pancreatitis (HCC)  Assessment & Plan  Patient complaining of chronic steatorrhea which is likely from pancreatic insufficiency given chronic pancreatitis in setting of EtOH abuse  RUQ ultrasound showing: Dilatation of the pancreatic duct up to 6 mm that appears chronic. Calcification in the region of the pancreatic duct in keeping with history of chronic pancreatitis.   GI recommendations appreciated  F/u fecal elastase  Continue Creon 48,000 units with each meal and 24,000 units with snacks  F/u other labs for chronic diarrhea as per GI  Outpatient GI follow-up for titration of Creon  Will need screening colonoscopy as outpatient    Transaminitis  Assessment & Plan  Noted on admission,  now normalized  Suspect secondary to chronic EtOH use  RUQ reviewed  Mri showing chronic pancreatitis  Chronic hepatitis panel negative  GI following, encouraged alcohol cessation    Acute low back pain  Assessment & Plan  Patient reporting lower back pain and states this has been a chronic issue for him and that it has been going on for years  Xray lumbar spine showing no osseous abnormality  Continue Tylenol, Lidoderm patch, Robaxin and low-dose oxycodone as needed  PT, OT evaluation complete, patient with no needs    Alcohol abuse with history of withdrawal (HCC)  Assessment & Plan  Reportedly drinks up to 6 beers daily  History of alcohol withdrawals, last drink reported 03/07  [facility] protocol  Continue thiamine and folate supplementation    Type 2 diabetes mellitus with hypoglycemia, with long-term current use of insulin (HCC)  Assessment & Plan  Lab Results   Component Value Date    HGBA1C 15.7 (H) 01/26/2024       Recent Labs     03/13/24  0739 03/13/24  0751 03/13/24  1109 03/13/24  1613   POCGLU 170* 167* 224* 200*     Previous A1c not well-controlled at 15.7  Previously discharged home on Lantus 10 units, empagliflozin 25mg daily, trulicity once weekly  Unclear of compliance with diabetes medication regimen, presented with hypoglycemia and managed in the ICU  Glucose better controlled on Lantus 5 units and sliding scale    Essential hypertension  Assessment & Plan  Uncontrolled  Discontinue lisinopril given ZENAIDA as per Nephro  Continue Norvasc 5 mg BID  Continue Hydralazine 25 mg every 8 hours           VTE Pharmacologic Prophylaxis:   Moderate Risk (Score 3-4) - Pharmacological DVT Prophylaxis Ordered: heparin.    Mobility:   Basic Mobility Inpatient Raw Score: 24  JH-HLM Goal: 8: Walk 250 feet or more  JH-HLM Achieved: 8: Walk 250 feet ot more  HLM Goal achieved. Continue to encourage appropriate mobility.    Patient Centered Rounds: I performed bedside rounds with nursing staff today.   Discussions with  Specialists or Other Care Team Provider: Nephrology, GI    Education and Discussions with Family / Patient: Updated  (father) at bedside.    Current Length of Stay: 5 day(s)  Current Patient Status: Inpatient   Certification Statement: The patient will continue to require additional inpatient hospital stay due to monitoring of renal function  Discharge Plan: Anticipate discharge tomorrow to home.    Code Status: Level 1 - Full Code    Subjective:   Patient seen and examined at bedside.  Denies any complaints at this time.  Endorsing improvement in his fatty diarrhea.    Objective:     Vitals:   Temp (24hrs), Av.6 °F (36.4 °C), Min:97.6 °F (36.4 °C), Max:97.6 °F (36.4 °C)    Temp:  [97.6 °F (36.4 °C)] 97.6 °F (36.4 °C)  HR:  [72-85] 83  Resp:  [18] 18  BP: (123-165)/(64-82) 123/64  SpO2:  [98 %-100 %] 98 %  Body mass index is 19.27 kg/m².     Input and Output Summary (last 24 hours):     Intake/Output Summary (Last 24 hours) at 3/13/2024 1622  Last data filed at 3/13/2024 1301  Gross per 24 hour   Intake --   Output 350 ml   Net -350 ml       Physical Exam:   Physical Exam  Vitals and nursing note reviewed.   Constitutional:       General: He is not in acute distress.     Appearance: He is well-developed.   HENT:      Head: Normocephalic and atraumatic.   Eyes:      Conjunctiva/sclera: Conjunctivae normal.   Cardiovascular:      Rate and Rhythm: Normal rate and regular rhythm.      Heart sounds: No murmur heard.  Pulmonary:      Effort: Pulmonary effort is normal. No respiratory distress.      Breath sounds: Normal breath sounds.   Abdominal:      Palpations: Abdomen is soft.      Tenderness: There is no abdominal tenderness.   Musculoskeletal:         General: No swelling.      Cervical back: Neck supple.   Skin:     General: Skin is warm and dry.      Capillary Refill: Capillary refill takes less than 2 seconds.   Neurological:      Mental Status: He is alert.   Psychiatric:         Mood and  Affect: Mood normal.            Additional Data:     Labs:  Results from last 7 days   Lab Units 03/11/24  0544   WBC Thousand/uL 10.00   HEMOGLOBIN g/dL 10.0*   HEMATOCRIT % 29.2*   PLATELETS Thousands/uL 218   NEUTROS PCT % 61   LYMPHS PCT % 28   MONOS PCT % 8   EOS PCT % 3     Results from last 7 days   Lab Units 03/13/24  0458 03/12/24  0515   SODIUM mmol/L 135 135   POTASSIUM mmol/L 4.2 4.1   CHLORIDE mmol/L 105 109*   CO2 mmol/L 26 20*   BUN mg/dL 24 16   CREATININE mg/dL 1.72* 1.67*   ANION GAP mmol/L 4 6   CALCIUM mg/dL 8.1* 7.7*   ALBUMIN g/dL  --  2.8*   TOTAL BILIRUBIN mg/dL  --  0.25   ALK PHOS U/L  --  73   ALT U/L  --  31   AST U/L  --  21   GLUCOSE RANDOM mg/dL 213* 205*     Results from last 7 days   Lab Units 03/08/24  0522   INR  0.97     Results from last 7 days   Lab Units 03/13/24  1613 03/13/24  1109 03/13/24  0751 03/13/24  0739 03/12/24  2052 03/12/24  1543 03/12/24  1127 03/12/24  0659 03/11/24  2108 03/11/24  1613 03/11/24  1110 03/11/24  0714   POC GLUCOSE mg/dl 200* 224* 167* 170* 185* 291* 65 153* 268* 236* 147* 166*               Lines/Drains:  Invasive Devices       Peripheral Intravenous Line  Duration             Peripheral IV 03/11/24 Dorsal (posterior);Left Forearm 2 days                          Imaging: Reviewed radiology reports from this admission including: Kidney and bladder ultrasound    Recent Cultures (last 7 days):         Last 24 Hours Medication List:   Current Facility-Administered Medications   Medication Dose Route Frequency Provider Last Rate    acetaminophen  975 mg Oral Q8H [facility] Phong Sy MD      amLODIPine  5 mg Oral BID ARIANE Reardon      chlorhexidine  15 mL Mouth/Throat Q12H [facility] ARIANE Quinones      ergocalciferol  50,000 Units Oral Weekly ARIANE Reardon      heparin (porcine)  5,000 Units Subcutaneous Q8H [facility] ARIANE Quinones      hydrALAZINE  25 mg Oral Q8H [facility] Fannie Hernandez MD      insulin glargine  5  Units Subcutaneous QAM Phong Sy MD      insulin lispro  1-5 Units Subcutaneous TID AC Phong Sy MD      lidocaine  1 patch Topical Daily Phong Sy MD      methocarbamol  500 mg Oral Q6H PRN Phong Sy MD      oxyCODONE  2.5 mg Oral Q6H PRN Phong Sy MD      pancrelipase (Lip-Prot-Amyl)  48,000 Units Oral TID With Meals Igor Ramesh MD          Today, Patient Was Seen By: Fannie York MD    **Please Note: This note may have been constructed using a voice recognition system.**

## 2024-03-13 NOTE — ASSESSMENT & PLAN NOTE
Uncontrolled  Discontinue lisinopril given ZENAIDA as per Nephro  Continue Norvasc 5 mg BID  Continue Hydralazine 25 mg every 8 hours

## 2024-03-13 NOTE — ASSESSMENT & PLAN NOTE
Lab Results   Component Value Date    HGBA1C 15.7 (H) 01/26/2024       Recent Labs     03/13/24  0739 03/13/24  0751 03/13/24  1109 03/13/24  1613   POCGLU 170* 167* 224* 200*     Previous A1c not well-controlled at 15.7  Previously discharged home on Lantus 10 units, empagliflozin 25mg daily, trulicity once weekly  Unclear of compliance with diabetes medication regimen, presented with hypoglycemia and managed in the ICU  Glucose better controlled on Lantus 5 units and sliding scale

## 2024-03-13 NOTE — PLAN OF CARE
Problem: Potential for Falls  Goal: Patient will remain free of falls  Description: INTERVENTIONS:  - Educate patient/family on patient safety including physical limitations  - Instruct patient to call for assistance with activity   - Consult OT/PT to assist with strengthening/mobility   - Keep Call bell within reach  - Keep bed low and locked with side rails adjusted as appropriate  - Keep care items and personal belongings within reach  - Initiate and maintain comfort rounds  - Make Fall Risk Sign visible to staff  - Apply yellow socks and bracelet for high fall risk patients  - Consider moving patient to room near nurses station  Outcome: Progressing     Problem: PAIN - ADULT  Goal: Verbalizes/displays adequate comfort level or baseline comfort level  Description: Interventions:  - Encourage patient to monitor pain and request assistance  - Assess pain using appropriate pain scale  - Administer analgesics based on type and severity of pain and evaluate response  - Implement non-pharmacological measures as appropriate and evaluate response  - Consider cultural and social influences on pain and pain management  - Notify physician/advanced practitioner if interventions unsuccessful or patient reports new pain  Outcome: Progressing     Problem: INFECTION - ADULT  Goal: Absence or prevention of progression during hospitalization  Description: INTERVENTIONS:  - Assess and monitor for signs and symptoms of infection  - Monitor lab/diagnostic results  - Monitor all insertion sites, i.e. indwelling lines, tubes, and drains  - Monitor endotracheal if appropriate and nasal secretions for changes in amount and color  - Tunas appropriate cooling/warming therapies per order  - Administer medications as ordered  - Instruct and encourage patient and family to use good hand hygiene technique  - Identify and instruct in appropriate isolation precautions for identified infection/condition  Outcome: Progressing  Goal: Absence  of fever/infection during neutropenic period  Description: INTERVENTIONS:  - Monitor WBC    Outcome: Progressing     Problem: SAFETY ADULT  Goal: Patient will remain free of falls  Description: INTERVENTIONS:  - Educate patient/family on patient safety including physical limitations  - Instruct patient to call for assistance with activity   - Consult OT/PT to assist with strengthening/mobility   - Keep Call bell within reach  - Keep bed low and locked with side rails adjusted as appropriate  - Keep care items and personal belongings within reach  - Initiate and maintain comfort rounds  - Make Fall Risk Sign visible to staff  - Apply yellow socks and bracelet for high fall risk patients  - Consider moving patient to room near nurses station  Outcome: Progressing  Goal: Maintain or return to baseline ADL function  Description: INTERVENTIONS:  - Educate patient/family on patient safety including physical limitations  - Instruct patient to call for assistance with activity   - Consult OT/PT to assist with strengthening/mobility   - Keep Call bell within reach  - Keep bed low and locked with side rails adjusted as appropriate  - Keep care items and personal belongings within reach  - Initiate and maintain comfort rounds  - Make Fall Risk Sign visible to staff  - Apply yellow socks and bracelet for high fall risk patients  - Consider moving patient to room near nurses station  Outcome: Progressing  Goal: Maintains/Returns to pre admission functional level  Description: INTERVENTIONS:  - Perform AM-PAC 6 Click Basic Mobility/ Daily Activity assessment daily.  - Set and communicate daily mobility goal to care team and patient/family/caregiver.   - Collaborate with rehabilitation services on mobility goals if consulted  - Stand patient 3 times a day  - Ambulate patient 3 times a day  - Out of bed to chair 3 times a day   - Out of bed for meals 3  Problem: DISCHARGE PLANNING  Goal: Discharge to home or other facility with  appropriate resources  Description: INTERVENTIONS:  - Identify barriers to discharge w/patient and caregiver  - Arrange for needed discharge resources and transportation as appropriate  - Identify discharge learning needs (meds, wound care, etc.)  - Arrange for interpretive services to assist at discharge as needed  - Refer to Case Management Department for coordinating discharge planning if the patient needs post-hospital services based on physician/advanced practitioner order or complex needs related to functional status, cognitive ability, or social support system  Outcome: Progressing     Problem: Knowledge Deficit  Goal: Patient/family/caregiver demonstrates understanding of disease process, treatment plan, medications, and discharge instructions  Description: Complete learning assessment and assess knowledge base.  Interventions:  - Provide teaching at level of understanding  - Provide teaching via preferred learning methods  Outcome: Progressing    times a day  - Out of bed for toileting  - Record patient progress and toleration of activity level   Outcome: Progressing

## 2024-03-13 NOTE — PROGRESS NOTES
"[facility] - Progress Note  Wes Araujo 54 y.o. male MRN: [id number]  Unit/Bed#: E5 -01 Encounter: [id number]      ASSESSMENT & PLAN:    alcohol use disorder, T2DM, CKD III, pAfib, chronic pancreatitis, HTN, polysubstance use disorder presenting with hypoglycemia s/p D10 gtt in ICU now on floors, with GI consulted for steatorrhea.      Steatorrhea in s/o Hx of Chronic Pancreatitis  Pt has longstanding history of steatorrhea and diet induced chronic diarrhea  F/u stool studies as outpt   Etiology of chronic pancreatitis likely 2/2 significant alcohol use hx   Triglycerides normal  IgG4 in process; outpt f/u  MRCP w no concerning masses  Continue creon TID 42,000 w meals on discharge  Outpt GI f/u - will message schedulers to arrange     No additional inpatient GI recommendations, we will sign off. Please call us back if additional questions or concerns .  ______________________________________________________________________    SUBJECTIVE:     Triglycerides normal, IgG4 in progress. Stool studies pending, not yet collected. MRCP notes \"pancreas is essentially entirely fatty replaced,\" chronic dilation of PD to 8 mm in body and tail w abrupt caliber change at pancreatic head and neck where there are focal hypointensities. This is unchanged from prior CT. Started on creon yesterday, pt notes improved bowel movements today.    Scheduled Meds:  Current Facility-Administered Medications   Medication Dose Route Frequency Provider Last Rate    acetaminophen  975 mg Oral Q8H [facility] Phong Sy MD      amLODIPine  5 mg Oral BID ARIANE Reardon      chlorhexidine  15 mL Mouth/Throat Q12H [facility] ARIANE Quinones      ergocalciferol  50,000 Units Oral Weekly ARIANE Reardon      heparin (porcine)  5,000 Units Subcutaneous Q8H [facility] ARIANE Quinones      hydrALAZINE  25 mg Oral Q8H [facility] Fannie Hernandez MD      insulin glargine  5 Units Subcutaneous QAM " "Phong Sy MD      insulin lispro  1-5 Units Subcutaneous TID AC Phong Sy MD      lidocaine  1 patch Topical Daily Phong Sy MD      methocarbamol  500 mg Oral Q6H PRN Phong Sy MD      oxyCODONE  2.5 mg Oral Q6H PRN Phong Sy MD      pancrelipase (Lip-Prot-Amyl)  48,000 Units Oral TID With Meals Igor Ramesh MD       Continuous Infusions:   PRN Meds:.  methocarbamol    oxyCODONE    OBJECTIVE:     Objective   Blood pressure 162/82, pulse 85, temperature 97.6 °F (36.4 °C), resp. rate 18, height 5' 9\" (1.753 m), weight 59.2 kg (130 lb 8.2 oz), SpO2 99%. Body mass index is 19.27 kg/m².  No intake or output data in the 24 hours ending 03/13/24 0907    PHYSICAL EXAM:   General Appearance: Awake and alert, in no acute distress  Abdomen: Soft, non-tender, non-distended; no masses or no organomegaly    Invasive Devices       Peripheral Intravenous Line  Duration             Peripheral IV 03/11/24 Dorsal (posterior);Left Forearm 1 day                    LAB RESULTS:      Lab Units 03/13/24  0458 03/12/24  0515 03/11/24  0544 03/10/24  0524 03/09/24  0512 02/03/24  0444 02/02/24  0429 02/01/24  2032 01/26/24  0840 01/26/24  0506 10/17/23  0122 02/10/23  1506 12/08/22  1143 11/24/22  0438   SODIUM mmol/L 135 135 136 134* 136   < > 139 140   < > 136   < > 137   < > 137   POTASSIUM mmol/L 4.2 4.1 4.1 4.0 4.4   < > 3.2* 2.7*   < > 3.8   < > 3.3*   < > 4.1   CHLORIDE mmol/L 105 109* 113* 110* 114*   < > 112* 111*   < > 105   < > 108   < > 103   CO2 mmol/L 26 20* 19* 21 18*   < > 23 23   < > 28   < > 24   < > 27   BUN mg/dL 24 16 16 11 16   < > 9 9   < > 8   < > 12   < > 11   CREATININE mg/dL 1.72* 1.67* 1.81* 1.69* 1.42*   < > 1.12 1.03   < > 0.90   < > 1.02   < > 1.08   GLUCOSE RANDOM mg/dL 213* 205* 102 223* 134   < > 41* 25*   < > 200*   < > 231*   < > 265*   CALCIUM mg/dL 8.1* 7.7* 7.6* 7.8* 7.5*   < > 7.8* 8.0*   < > 8.7   < > 7.7*   < > 8.2*   MAGNESIUM mg/dL  --  1.9 1.6*  --  1.7*  --  " 2.3 1.8*  --  1.9   < > 1.4*   < > 1.8   PHOSPHORUS mg/dL  --   --   --   --  2.7  --   --   --   --  1.6*  --  3.7  --  3.2    < > = values in this interval not displayed.            Lab Units 03/12/24  0515 03/08/24  0522 02/01/24 2032 01/25/24  1010 10/17/23  0122   TOTAL PROTEIN g/dL 5.2* 6.5 5.4* 6.4 6.7   ALBUMIN g/dL 2.8* 3.7 3.0* 3.6 3.7   TOTAL BILIRUBIN mg/dL 0.25 0.33 0.58 0.54 0.47   AST U/L 21 50* 18 32 24   ALT U/L 31 72* 22 56* 29   ALK PHOS U/L 73 117* 51 103 81           Lab Units 03/11/24  0544 03/10/24  0524 03/09/24  0915 03/08/24 0522 02/03/24  0444   WBC Thousand/uL 10.00 9.51 11.65* 9.19 13.30*   HEMOGLOBIN g/dL 10.0* 10.4* 9.8* 12.1 7.7*   HEMATOCRIT % 29.2* 29.8* 28.1* 35.2* 22.6*   PLATELETS Thousands/uL 218 202 181 229 246   MCV fL 91 90 90 90 91       Lab Results   Component Value Date    IRON 83 02/02/2024    TIBC 189 (L) 02/02/2024    FERRITIN 1,375 (H) 02/05/2019       Lab Results   Component Value Date    INR 0.97 03/08/2024    INR 1.07 10/17/2023    INR 1.09 02/10/2023    PROTIME 13.1 03/08/2024    PROTIME 13.9 10/17/2023    PROTIME 14.1 02/10/2023       RADIOLOGY RESULTS:   Procedure: MRI abdomen wo contrast and mrcp    Result Date: 3/13/2024  Narrative: MRI OF THE ABDOMEN WITHOUT CONTRAST WITH MRCP INDICATION: 54 years / Male. pancreatitis. COMPARISON: CT abdomen and pelvis 1/30/2022 TECHNIQUE: Multiplanar/multisequence MRI of the abdomen with 3D MRCP was performed without the administration of contrast. FINDINGS: LOWER CHEST: Small bilateral pleural effusions. LIVER: Normal in size and configuration. No suspicious mass. Limited evaluation of hepatic and portal veins on this non-contrast MRI is unremarkable. BILE DUCTS: No intrahepatic or extrahepatic bile duct dilation. Common bile duct is normal in caliber. No choledocholithiasis, biliary stricture or suspicious mass. GALLBLADDER: Normal. PANCREAS: The pancreas is essentially entirely fatty replaced. There is chronic dilation  of the pancreatic duct measuring up to 8 mm in the body and tail, with abrupt caliber change in the region of the pancreatic head and neck where there a focal hypointensities (for example series 5 image 12). This correlates to the appearance of previous CT examination which has a similar appearance. Findings are consistent with sequela of chronic pancreatitis. There is no significant edema to indicate acute pancreatitis. No peripancreatic fluid collections. ADRENAL GLANDS: Unremarkable. SPLEEN: Normal. KIDNEYS/PROXIMAL URETERS: No hydroureteronephrosis. No suspicious renal mass. BOWEL: No dilated loops of bowel. PERITONEUM/RETROPERITONEUM: No ascites. LYMPH NODES: No abdominal lymphadenopathy. VESSELS: No aneurysm. ABDOMINAL WALL: Unremarkable BONES: No suspicious osseous lesion.     Impression: Findings of chronic pancreatitis and essentially complete fatty replacement of the pancreas with ductal dilation secondary to pancreatic head calcifications, similar in appearance to 2022 CT exam. No significant inflammation to indicate acute pancreatitis. No peripancreatic fluid collections. Workstation performed: [id number]     Procedure: US right upper quadrant    Result Date: 3/12/2024  Narrative: RIGHT UPPER QUADRANT ULTRASOUND INDICATION: further eval of liver given transaminitis in chronic Etoh drinker. COMPARISON: 2/4/2019 TECHNIQUE: Real-time ultrasound of the right upper quadrant was performed with a curvilinear transducer with both volumetric sweeps and still imaging techniques. FINDINGS: PANCREAS: Dilated pancreatic duct up to 6 mm. Otherwise no unremarkable visualized pancreas. Calcification in the vicinity of the main duct. AORTA AND IVC: Visualized portions are normal for patient age. LIVER: Size: Within normal range. The liver measures 13.6 cm in the midclavicular line. Contour: Surface contour is smooth. Parenchyma: Echogenicity and echotexture are within normal limits. No liver mass identified. Limited  imaging of the main portal vein shows it to be patent and hepatopetal. BILIARY: Incidentally noted cholesterol pseudopolyp. No intrahepatic biliary dilatation. CBD measures 5.0 mm. No choledocholithiasis. KIDNEY: Right kidney measures 11.4 x 6.7 x 6.2 cm. Volume 246.7 mL Echogenic cortex, otherwise unremarkable ASCITES: None.     Impression: No cholelithiasis or sonographic signs of cholecystitis. No significant biliary dilatation. Dilatation of the pancreatic duct up to 6 mm that appears chronic. Calcification in the region of the pancreatic duct in keeping with history of chronic pancreatitis. Hyperechoic right renal cortex suggestive of medical renal disease. Workstation performed: [id number]     Procedure: XR spine lumbar 2 or 3 views injury    Result Date: 3/11/2024  Narrative: XR SPINE LUMBAR 2 OR 3 VIEWS INJURY INDICATION: low back pain. COMPARISON: None FINDINGS: No acute fracture. Intact pedicles. Five non-rib-bearing lumbar vertebral bodies. Normal alignment. No significant degenerative changes. No evidence of spondylolysis. Pancreatic calcifications     Impression: No acute osseous abnormality. Findings are stable Workstation performed: [id number]     Narrative/Impressions - 3 day look back     Igor Ramesh M.D.  PGY-5 Gastroenterology Fellow  [facility]  Available on [employer]  [email or web address]

## 2024-03-14 VITALS
DIASTOLIC BLOOD PRESSURE: 75 MMHG | OXYGEN SATURATION: 98 % | WEIGHT: 127.65 LBS | TEMPERATURE: 98.1 F | RESPIRATION RATE: 18 BRPM | HEART RATE: 76 BPM | HEIGHT: 69 IN | BODY MASS INDEX: 18.91 KG/M2 | SYSTOLIC BLOOD PRESSURE: 132 MMHG

## 2024-03-14 LAB
ANION GAP SERPL CALCULATED.3IONS-SCNC: 7 MMOL/L (ref 4–13)
BUN SERPL-MCNC: 30 MG/DL (ref 5–25)
CALCIUM SERPL-MCNC: 8.6 MG/DL (ref 8.4–10.2)
CHLORIDE SERPL-SCNC: 104 MMOL/L (ref 96–108)
CO2 SERPL-SCNC: 24 MMOL/L (ref 21–32)
CREAT SERPL-MCNC: 1.52 MG/DL (ref 0.6–1.3)
GFR SERPL CREATININE-BSD FRML MDRD: 51 ML/MIN/1.73SQ M
GLUCOSE SERPL-MCNC: 200 MG/DL (ref 65–140)
GLUCOSE SERPL-MCNC: 208 MG/DL (ref 65–140)
GLUCOSE SERPL-MCNC: 250 MG/DL (ref 65–140)
POTASSIUM SERPL-SCNC: 3.9 MMOL/L (ref 3.5–5.3)
PROINSULIN SERPL-SCNC: 1.1 PMOL/L (ref 0–10)
SODIUM SERPL-SCNC: 135 MMOL/L (ref 135–147)

## 2024-03-14 PROCEDURE — 80048 BASIC METABOLIC PNL TOTAL CA: CPT | Performed by: STUDENT IN AN ORGANIZED HEALTH CARE EDUCATION/TRAINING PROGRAM

## 2024-03-14 PROCEDURE — 99239 HOSP IP/OBS DSCHRG MGMT >30: CPT | Performed by: STUDENT IN AN ORGANIZED HEALTH CARE EDUCATION/TRAINING PROGRAM

## 2024-03-14 PROCEDURE — 82948 REAGENT STRIP/BLOOD GLUCOSE: CPT

## 2024-03-14 PROCEDURE — 99232 SBSQ HOSP IP/OBS MODERATE 35: CPT | Performed by: INTERNAL MEDICINE

## 2024-03-14 RX ORDER — AMLODIPINE BESYLATE 5 MG/1
5 TABLET ORAL 2 TIMES DAILY
Qty: 120 TABLET | Refills: 0 | Status: SHIPPED | OUTPATIENT
Start: 2024-03-14 | End: 2024-05-13

## 2024-03-14 RX ORDER — ERGOCALCIFEROL 1.25 MG/1
50000 CAPSULE ORAL WEEKLY
Qty: 4 CAPSULE | Refills: 0 | Status: SHIPPED | OUTPATIENT
Start: 2024-03-19 | End: 2024-05-29

## 2024-03-14 RX ORDER — HYDRALAZINE HYDROCHLORIDE 25 MG/1
25 TABLET, FILM COATED ORAL EVERY 8 HOURS SCHEDULED
Qty: 180 TABLET | Refills: 0 | Status: SHIPPED | OUTPATIENT
Start: 2024-03-14 | End: 2024-05-13

## 2024-03-14 RX ORDER — METHOCARBAMOL 500 MG/1
500 TABLET, FILM COATED ORAL EVERY 6 HOURS PRN
Qty: 10 TABLET | Refills: 0 | Status: SHIPPED | OUTPATIENT
Start: 2024-03-14

## 2024-03-14 RX ADMIN — HYDRALAZINE HYDROCHLORIDE 25 MG: 25 TABLET ORAL at 05:19

## 2024-03-14 RX ADMIN — AMLODIPINE BESYLATE 5 MG: 5 TABLET ORAL at 08:16

## 2024-03-14 RX ADMIN — PANCRELIPASE 48000 UNITS: 24000; 76000; 120000 CAPSULE, DELAYED RELEASE PELLETS ORAL at 11:37

## 2024-03-14 RX ADMIN — INSULIN LISPRO 2 UNITS: 100 INJECTION, SOLUTION INTRAVENOUS; SUBCUTANEOUS at 11:36

## 2024-03-14 RX ADMIN — INSULIN LISPRO 1 UNITS: 100 INJECTION, SOLUTION INTRAVENOUS; SUBCUTANEOUS at 07:49

## 2024-03-14 RX ADMIN — PANCRELIPASE 48000 UNITS: 24000; 76000; 120000 CAPSULE, DELAYED RELEASE PELLETS ORAL at 07:49

## 2024-03-14 RX ADMIN — ACETAMINOPHEN 325MG 975 MG: 325 TABLET ORAL at 05:19

## 2024-03-14 RX ADMIN — INSULIN GLARGINE 5 UNITS: 100 INJECTION, SOLUTION SUBCUTANEOUS at 08:15

## 2024-03-14 RX ADMIN — LIDOCAINE 5% 1 PATCH: 700 PATCH TOPICAL at 08:16

## 2024-03-14 RX ADMIN — HEPARIN SODIUM 5000 UNITS: 5000 INJECTION INTRAVENOUS; SUBCUTANEOUS at 05:19

## 2024-03-14 NOTE — PLAN OF CARE
Problem: Potential for Falls  Goal: Patient will remain free of falls  Description: INTERVENTIONS:  - Educate patient/family on patient safety including physical limitations  - Instruct patient to call for assistance with activity   - Consult OT/PT to assist with strengthening/mobility   - Keep Call bell within reach  - Keep bed low and locked with side rails adjusted as appropriate  - Keep care items and personal belongings within reach  - Initiate and maintain comfort rounds  - Make Fall Risk Sign visible to staff  - Apply yellow socks and bracelet for high fall risk patients  - Consider moving patient to room near nurses station  Outcome: Progressing     Problem: PAIN - ADULT  Goal: Verbalizes/displays adequate comfort level or baseline comfort level  Description: Interventions:  - Encourage patient to monitor pain and request assistance  - Assess pain using appropriate pain scale  - Administer analgesics based on type and severity of pain and evaluate response  - Implement non-pharmacological measures as appropriate and evaluate response  - Consider cultural and social influences on pain and pain management  - Notify physician/advanced practitioner if interventions unsuccessful or patient reports new pain  Outcome: Progressing     Problem: INFECTION - ADULT  Goal: Absence or prevention of progression during hospitalization  Description: INTERVENTIONS:  - Assess and monitor for signs and symptoms of infection  - Monitor lab/diagnostic results  - Monitor all insertion sites, i.e. indwelling lines, tubes, and drains  - Monitor endotracheal if appropriate and nasal secretions for changes in amount and color  - [place] appropriate cooling/warming therapies per order  - Administer medications as ordered  - Instruct and encourage patient and family to use good hand hygiene technique  - Identify and instruct in appropriate isolation precautions for identified infection/condition  Outcome: Progressing  Goal: Absence  of fever/infection during neutropenic period  Description: INTERVENTIONS:  - Monitor WBC    Outcome: Progressing     Problem: SAFETY ADULT  Goal: Patient will remain free of falls  Description: INTERVENTIONS:  - Educate patient/family on patient safety including physical limitations  - Instruct patient to call for assistance with activity   - Consult OT/PT to assist with strengthening/mobility   - Keep Call bell within reach  - Keep bed low and locked with side rails adjusted as appropriate  - Keep care items and personal belongings within reach  - Initiate and maintain comfort rounds  - Make Fall Risk Sign visible to staff  - Apply yellow socks and bracelet for high fall risk patients  - Consider moving patient to room near nurses station  Outcome: Progressing  Goal: Maintain or return to baseline ADL function  Description: INTERVENTIONS:  - Educate patient/family on patient safety including physical limitations  - Instruct patient to call for assistance with activity   - Consult OT/PT to assist with strengthening/mobility   - Keep Call bell within reach  - Keep bed low and locked with side rails adjusted as appropriate  - Keep care items and personal belongings within reach  - Initiate and maintain comfort rounds  - Make Fall Risk Sign visible to staff  - Apply yellow socks and bracelet for high fall risk patients  - Consider moving patient to room near nurses station  Outcome: Progressing  Goal: Maintains/Returns to pre admission functional level  Description: INTERVENTIONS:  - Perform AM-PAC 6 Click Basic Mobility/ Daily Activity assessment daily.  - Set and communicate daily mobility goal to care team and patient/family/caregiver.   - Collaborate with rehabilitation services on mobility goals if consulted  - Perform Range of Motion 3 times a day.  - Reposition patient every 2 hours.  - Dangle patient 3 times a day  - Stand patient 3 times a day  - Ambulate patient 3 times a day  - Out of bed to chair 3 times a  day   - Out of bed for meals 3 times a day  - Out of bed for toileting  - Record patient progress and toleration of activity level   Outcome: Progressing     Problem: DISCHARGE PLANNING  Goal: Discharge to home or other facility with appropriate resources  Description: INTERVENTIONS:  - Identify barriers to discharge w/patient and caregiver  - Arrange for needed discharge resources and transportation as appropriate  - Identify discharge learning needs (meds, wound care, etc.)  - Arrange for interpretive services to assist at discharge as needed  - Refer to Case Management Department for coordinating discharge planning if the patient needs post-hospital services based on physician/advanced practitioner order or complex needs related to functional status, cognitive ability, or social support system  Outcome: Progressing     Problem: Knowledge Deficit  Goal: Patient/family/caregiver demonstrates understanding of disease process, treatment plan, medications, and discharge instructions  Description: Complete learning assessment and assess knowledge base.  Interventions:  - Provide teaching at level of understanding  - Provide teaching via preferred learning methods  Outcome: Progressing

## 2024-03-14 NOTE — PROGRESS NOTES
Discharge instructions reviewed with patient with a Turkmen speaking staff member. Patient was instructed that he should not be taking insulin (lantus). Patient stated that he needs to take insulin in order to feel hungry and eat. If he doesn't take insulin, he will not eat. I advised the patient that he is not supposed to take lantus and that his primary care doctor discontinued the lantus and ordered Januiva and Trulicity. He said he does not take the pills his primary care doctor gave him and he is going to continue to take insulin when he feels he needs it. I informed patient that too much insulin can cause him to die. 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

## 2024-03-14 NOTE — CASE MANAGEMENT
Case Management Discharge Planning Note    Patient name Wes Araujo  Location [facility] /E5 -* MRN [id number]  : 1970 Date 3/14/2024       Current Admission Date: 3/8/2024  Current Admission Diagnosis:Hypoglycemia   Patient Active Problem List    Diagnosis Date Noted    Transaminitis 2024    Alcohol-induced chronic pancreatitis (HCC) 2024    Acute low back pain 03/10/2024    SIRS (systemic inflammatory response syndrome) (HCC) 2024    Alcohol abuse with history of withdrawal (HCC) 2024    Elevated brain natriuretic peptide (BNP) level 2024    Type 2 diabetes mellitus (HCC) 2024    Chronic low back pain 2023    Alcohol withdrawal (HCC) 2023    Diarrhea 2023    Seizure (HCC) 2022    Illicit drug use 2022    Leukocytosis 2022    Hypothermia 2022    Cocaine use 2022    Abnormal chest x-ray 2022    Bilateral hearing loss 2020    Hypoglycemia 2020    Hypokalemia 2020    Acute renal failure superimposed on stage 3 chronic kidney disease (HCC) 2019    Anemia 2019    Hyponatremia 2019    History of atrial fibrillation 2019    Acute encephalopathy 2019    Alcohol intoxication in active alcoholic with complication (HCC) 2016    Essential hypertension     Type 2 diabetes mellitus with hypoglycemia, with long-term current use of insulin (HCC)     Uncomplicated alcohol dependence (HCC)     Paroxysmal A-fib (HCC)     Chronic pancreatitis (HCC)     Thrombocytopenia (HCC)       LOS (days): 6  Geometric Mean LOS (GMLOS) (days): 3.1  Days to GMLOS:-3     OBJECTIVE:  Risk of Unplanned Readmission Score: 30.96         Current admission status: Inpatient   Preferred Pharmacy:    [facility] - [address] [place] [address] STREET  [address] [place] [id number]  Phone: [phone] Fax: [phone]    [facility] [address]  [address],  [address]  [facility],  First Floor [facility] [address]  Phone: [phone] Fax: [phone]    Primary Care Provider: Stuart Mata DO    Primary Insurance: Pinstant Karma  Secondary Insurance:     DISCHARGE DETAILS:    Discharge planning discussed with:: Patient  Freedom of Choice: Yes  Comments - Freedom of Choice: D/C no needs  CM contacted family/caregiver?: Yes  Were Treatment Team discharge recommendations reviewed with patient/caregiver?: Yes  Did patient/caregiver verbalize understanding of patient care needs?: Yes  Were patient/caregiver advised of the risks associated with not following Treatment Team discharge recommendations?: Yes    Contacts  Patient Contacts: Wes Araujo  Relationship to Patient:: Family  Contact Method: Phone (Left Voicemail)  Phone Number: [phone]  Reason/Outcome: Discharge Planning    Requested Home Health Care         Is the patient interested in HHC at discharge?: No    DME Referral Provided  Referral made for DME?: No    Other Referral/Resources/Interventions Provided:  Referral Comments: Patient declined HOST referral from CM    Would you like to participate in our [facility] service program?  : No - Declined    Treatment Team Recommendation: Home  Discharge Destination Plan:: Home  Transport at Discharge : Family, Ride Share      ETA of Transport (Date): 03/14/24  ETA of Transport (Time): 1230     Transfer Mode: Ambulate      Additional Comments: CM attempted to speak with patient at bedside to discuss DCP and transportation with . Patient refused Host, but then stated he did not understand . CM them called patient's father with  # [phone], left a voicemail to callback. CM them asked  to call into patient's room. Patient asked about d/c medications. CM explained that bedside nurse will review medications with him prior to his leaving. His family is expected to be her at bedside for  transport. CM will standby to assist with transport arrnagements should family not be available.

## 2024-03-14 NOTE — PROGRESS NOTES
NEPHROLOGY PROGRESS NOTE   Wes Araujo 54 y.o. male MRN: [id number]  Unit/Bed#: E5 -01 Encounter: [id number]  Reason for Consult: ZENAIDA on CKD II/IIIA    54-year-old Italian-speaking male with history of poorly controlled diabetes, hypertension, alcohol abuse, medical noncompliance, who presents with altered mental status. He was found to be hypoglycemic. Nephrology is consulted due to acute kidney injury.     ASSESSMENT/PLAN:  Acute kidney injury on suspected CKD stage II/IIIA: Prerenal related to diarrhea/volume depletion as well as ACE inhibition.  -ZENAIDA in February with peak creatinine of 7.4 and discharge creatinine of 2.4.  -Baseline creatinine 0.8-1.1.  -Peak creatinine 1.81 on 3/11, improved to 1.52.  -Presented with a creatinine of 1.38.  -Continue holding ACE inhibitor.  -UA with glucose, +1 protein, 0-3 hyaline casts.  -Bladder scan insignificant.  -renal imaging negative for hydro.   -Continue to avoid nephrotoxins, hypotension, IV contrast.  -I/O.     Hypomagnesemia: Status post 2 g IV mag x 1.  Suspect related to diarrheal stools.  -Repeat mag level normal. Continue to monitor and replace as needed.     [place]ap acidosis: Suspect related to increased GI loss with diarrhea versus increasing creatinine. (Resolved)  -Received IV bicarbonate.     Hypocalcemia: Corrects to normal to albumin.  -.8, vitamin D 25 hydroxy level severely low, less than 7.0.  -continue on ergocalciferol 50,000 units weekly.     Hypertension: Blood pressure is improved.   -Home medication: Lisinopril 10 mg daily.  -Current medication: Amlodipine 5 mg BID, lisinopril 10 mg daily.  Recommendation: Continue to hold lisinopril.  Continue amlodipine 5 mg twice daily and hydralazine 25 mg po TID.  -Holding parameters adjusted for systolic blood pressure less than 130 mmHg.  -Recommend avoiding hypotension or high fluctuations in blood pressure.     Transaminitis:  -Hepatitis panel and further workup per GI and primary care  team.     Anemia:  -Prior SPEP/UPEP negative.  -Previous iron panel with normal iron and normal iron sat.  -Continue to monitor and transfuse as needed for hemoglobin less than 7.0.     Other: Diabetes, alcohol abuse, chronic pancreatitis, atrial fibrillation    Disposition:  Okay to discharge from renal once medically cleared.    SUBJECTIVE:  Denies current complaints. Diarrhea has resolved.     OBJECTIVE:  Current Weight: Weight - Scale: 57.9 kg (127 lb 10.3 oz)  Vitals:    03/13/24 2255 03/14/24 0518 03/14/24 0600 03/14/24 0706   BP: 153/83 140/78  132/75   BP Location: Right arm      Pulse: 78 89  76   Resp: 18      Temp: 97.8 °F (36.6 °C)   98.1 °F (36.7 °C)   TempSrc: Oral      SpO2: 99% 97%  98%   Weight:   57.9 kg (127 lb 10.3 oz)    Height:           Intake/Output Summary (Last 24 hours) at 3/14/2024 0917  Last data filed at 3/13/2024 1301  Gross per 24 hour   Intake --   Output 350 ml   Net -350 ml     General: NAD  Skin: warm, dry, intact, no rash  HEENT: Moist mucous membranes, sclera anicteric, normocephalic, atraumatic  Neck: No apparent JVD appreciated  Chest: lung sounds clear B/L, on RA   CVS:Regular rate and rhythm, no murmer   Abdomen: Soft, round, non-tender, +BS  Extremities: No B/L LE edema present  Neuro: alert and oriented, [place]  Psych: appropriate mood and affect     Medications:    Current Facility-Administered Medications:     acetaminophen (TYLENOL) tablet 975 mg, 975 mg, Oral, Q8H [facility], Phong Sy MD, 975 mg at 03/14/24 0519    amLODIPine (NORVASC) tablet 5 mg, 5 mg, Oral, BID, ARIANE Reardon, 5 mg at 03/14/24 0816    ergocalciferol (VITAMIN D2) capsule 50,000 Units, 50,000 Units, Oral, Weekly, ARIANE Reardon, 50,000 Units at 03/12/24 1336    heparin (porcine) subcutaneous injection 5,000 Units, 5,000 Units, Subcutaneous, Q8H [facility], ARIANE Quinones, 5,000 Units at 03/14/24 0519    hydrALAZINE (APRESOLINE) tablet 25 mg, 25 mg, Oral, Q8H Fannie BLACKWOOD  MD David, 25 mg at 03/14/24 0519    insulin glargine (LANTUS) subcutaneous injection 5 Units 0.05 mL, 5 Units, Subcutaneous, QAM, Phong Sy MD, 5 Units at 03/14/24 0815    insulin lispro (HumALOG/ADMELOG) 100 units/mL subcutaneous injection 1-5 Units, 1-5 Units, Subcutaneous, TID AC, 1 Units at 03/14/24 0749 **AND** Fingerstick Glucose (POCT), , , TID AC, Phong Sy MD    lidocaine (LIDODERM) 5 % patch 1 patch, 1 patch, Topical, Daily, Phong Sy MD, 1 patch at 03/14/24 0816    methocarbamol (ROBAXIN) tablet 500 mg, 500 mg, Oral, Q6H PRN, Phong Sy MD, 500 mg at 03/11/24 2117    oxyCODONE (ROXICODONE) split tablet 2.5 mg, 2.5 mg, Oral, Q6H PRN, Phong Sy MD, 2.5 mg at 03/11/24 2117    pancrelipase (Lip-Prot-Amyl) (CREON) delayed release capsule 48,000 Units, 48,000 Units, Oral, TID With Meals, Igor Ramesh MD, 48,000 Units at 03/14/24 0749    Laboratory Results:  Results from last 7 days   Lab Units 03/14/24  0508 03/13/24  0458 03/12/24  0515 03/11/24  0544 03/10/24  0524 03/09/24  0915 03/09/24  0512 03/08/24  0522   WBC Thousand/uL  --   --   --  10.00 9.51 11.65*  --  9.19   HEMOGLOBIN g/dL  --   --   --  10.0* 10.4* 9.8*  --  12.1   HEMATOCRIT %  --   --   --  29.2* 29.8* 28.1*  --  35.2*   PLATELETS Thousands/uL  --   --   --  218 202 181  --  229   SODIUM mmol/L 135 135 135 136 134*  --  136 137   POTASSIUM mmol/L 3.9 4.2 4.1 4.1 4.0  --  4.4 3.7   CHLORIDE mmol/L 104 105 109* 113* 110*  --  114* 109*   CO2 mmol/L 24 26 20* 19* 21  --  18* 21   BUN mg/dL 30* 24 16 16 11  --  16 21   CREATININE mg/dL 1.52* 1.72* 1.67* 1.81* 1.69*  --  1.42* 1.38*   CALCIUM mg/dL 8.6 8.1* 7.7* 7.6* 7.8*  --  7.5* 8.5   MAGNESIUM mg/dL  --   --  1.9 1.6*  --   --  1.7*  --    PHOSPHORUS mg/dL  --   --   --   --   --   --  2.7  --    ALK PHOS U/L  --   --  73  --   --   --   --  117*   ALT U/L  --   --  31  --   --   --   --  72*   AST U/L  --   --  21  --   --   --   --  50*

## 2024-03-14 NOTE — DISCHARGE INSTR - AVS FIRST PAGE
Dear Wes Araujo,     It was our pleasure to care for you here at [facility].  It is our hope that we were always able to exceed the expected standards for your care during your stay.  You were hospitalized due to low back pain .  You were cared for on the 5th floor under the service of Fannie York MD with the [facility] who covers for your primary care physician (PCP), Stuart Mata DO, while you were hospitalized.  If you have any questions or concerns related to this hospitalization, you may contact us at .  For follow up as well as medication refills, we recommend that you follow up with your primary care physician.  A registered nurse will reach out to you by phone within a few days after your discharge to answer any additional questions that you may have after going home.  However, at this time we provide for you here, the most important instructions / recommendations at discharge:     Notable Medication Adjustments -   You are to stop taking Lisinopril  You were started on Amlodipine and Hydralazine for hypertension while you are admitted.  Please see your primary care provider within 1 week of discharge for further titration of your medications  You were started on Creon for pancreatic insufficiency.  Please take all your medications as prescribed  Testing Required after Discharge -   Screening colonoscopy as per GI recommendation   ** Please contact your PCP to request testing orders for any of the testing recommended here **  Important Follow Up Information -   You were referred to gastroenterology for chronic pancreatitis as well as nephrology for chronic kidney disease  Please follow-up with your primary care provider within 1 week of discharge  Other Instructions -   While you are stable for discharge home at this time, your symptoms may change.  Please seek immediate medical attention for  any concerning symptoms.  Please review this entire after visit summary as additional general instructions including medication list, appointments, activity, diet, any pertinent wound care, and other additional recommendations from your care team that may be provided for you.      Sincerely,     Fannie York MD

## 2024-03-14 NOTE — ASSESSMENT & PLAN NOTE
Lab Results   Component Value Date    HGBA1C 15.7 (H) 01/26/2024       Recent Labs     03/13/24  1109 03/13/24  1613 03/13/24  2103 03/14/24  0706   POCGLU 224* 200* 232* 208*     Previous A1c not well-controlled at 15.7  Previously discharged home on Lantus 10 units, empagliflozin 25mg daily, trulicity once weekly  Unclear of compliance with diabetes medication regimen, presented with hypoglycemia and managed in the ICU  Glucose better controlled on Lantus 5 units and sliding scale while admitted  Patient will be discharged home on his previous regimen with instructions to monitor for signs of hypoglycemia

## 2024-03-14 NOTE — ASSESSMENT & PLAN NOTE
Reportedly drinks up to 6 beers daily  History of alcohol withdrawals, last drink reported 03/07  [facility] protocol  Continue thiamine and folate supplementation

## 2024-03-14 NOTE — DISCHARGE SUMMARY
[facility]  Discharge- Wes Araujo 1970, 54 y.o. male MRN: [id number]  Unit/Bed#: E5 -01 Encounter: [id number]  Primary Care Provider: Stuart Mata DO   Date and time admitted to hospital: 3/8/2024  5:08 AM    * Hypoglycemia  Assessment & Plan  Admitted to the ICU, reportedly self administered an inappropriate dose of Lantus insulin 20 units  Had multiple prior hospital admissions for hypoglycemia  Treated with D5, with subsequent blood glucose checks improving  Doing well on Lantus at decreased dose of 5 units in a.m.   continue sliding scale and Accu-Cheks  Monitor blood glucose   Hypoglycemia protocol    Acute renal failure superimposed on stage 3 chronic kidney disease (HCC)  Assessment & Plan  Lab Results   Component Value Date    EGFR 51 03/14/2024    EGFR 44 03/13/2024    EGFR 45 03/12/2024    CREATININE 1.52 (H) 03/14/2024    CREATININE 1.72 (H) 03/13/2024    CREATININE 1.67 (H) 03/12/2024     Creatinine stable at this time, patient will need to continue outpatient follow-up with nephrology  Nephrology consulted, recommendations appreciated  I&O monitoring  Avoid nephrotoxins  Renal ultrasound showing Increased renal cortical echogenicity suggestive of medical renal disease.   Will continue to hold lisinopril on discharge    Alcohol-induced chronic pancreatitis (HCC)  Assessment & Plan  Patient complaining of chronic steatorrhea which is likely from pancreatic insufficiency given chronic pancreatitis in setting of EtOH abuse  RUQ ultrasound showing: Dilatation of the pancreatic duct up to 6 mm that appears chronic. Calcification in the region of the pancreatic duct in keeping with history of chronic pancreatitis.   GI recommendations appreciated  F/u fecal elastase  Continue Creon 48,000 units with each meal and 24,000 units with snacks  F/u other labs for chronic diarrhea as per GI  Outpatient GI follow-up for titration of Creon  Will need screening  colonoscopy as outpatient    Transaminitis  Assessment & Plan  Noted on admission, now normalized  Suspect secondary to chronic EtOH use  RUQ reviewed  MRI showing chronic pancreatitis  Chronic hepatitis panel negative  GI following, encouraged alcohol cessation    Acute low back pain  Assessment & Plan  Patient reporting lower back pain and states this has been a chronic issue for him and that it has been going on for years  Xray lumbar spine showing no osseous abnormality  Continue Tylenol, Lidoderm patch, Robaxin and low-dose oxycodone as needed  PT, OT evaluation complete, patient with no needs    Alcohol abuse with history of withdrawal (HCC)  Assessment & Plan  Reportedly drinks up to 6 beers daily  History of alcohol withdrawals, last drink reported 03/07  [facility] protocol  Continue thiamine and folate supplementation    Type 2 diabetes mellitus with hypoglycemia, with long-term current use of insulin (HCC)  Assessment & Plan  Lab Results   Component Value Date    HGBA1C 15.7 (H) 01/26/2024       Recent Labs     03/13/24  1109 03/13/24  1613 03/13/24  2103 03/14/24  0706   POCGLU 224* 200* 232* 208*     Previous A1c not well-controlled at 15.7  Previously discharged home on Lantus 10 units, empagliflozin 25mg daily, trulicity once weekly  Unclear of compliance with diabetes medication regimen, presented with hypoglycemia and managed in the ICU  Glucose better controlled on Lantus 5 units and sliding scale while admitted  Patient will be discharged home on his previous regimen with instructions to monitor for signs of hypoglycemia      Essential hypertension  Assessment & Plan  Discontinue lisinopril given ZENAIDA as per Nephro  Continue Norvasc 5 mg BID  Continue Hydralazine 25 mg every 8 hours  Patient recommended to follow-up with his primary care provider for further titration of blood pressure medications      Medical Problems       Resolved Problems  Date Reviewed: 3/10/2024   None       Discharging Physician /  Practitioner: Fannie York MD  PCP: Stuart Mata DO  Admission Date:   Admission Orders (From admission, onward)       Ordered        03/08/24 1139  INPATIENT ADMISSION  Once                          Discharge Date: 03/14/24    Consultations During Hospital Stay:  GI  Nephrology    Procedures Performed:   none    Significant Findings / Test Results:   US kidney and bladder   Final Result by Liu Castillo DO (03/13 1685)      1. No hydronephrosis.      2. Increased renal cortical echogenicity suggestive of medical renal disease.            Workstation performed: [id number]         MRI abdomen wo contrast and mrcp   Final Result by Liu Ivan MD (03/13 0841)      Findings of chronic pancreatitis and essentially complete fatty replacement of the pancreas with ductal dilation secondary to pancreatic head calcifications, similar in appearance to 2022 CT exam. No significant inflammation to indicate acute    pancreatitis. No peripancreatic fluid collections.         Workstation performed: [id number]         US right upper quadrant   Final Result by Sowmya Villarreal MD (03/12 1447)      No cholelithiasis or sonographic signs of cholecystitis.   No significant biliary dilatation.   Dilatation of the pancreatic duct up to 6 mm that appears chronic. Calcification in the region of the pancreatic duct in keeping with history of chronic pancreatitis.   Hyperechoic right renal cortex suggestive of medical renal disease.      Workstation performed: [id number]         XR spine lumbar 2 or 3 views injury   Final Result by Lopez Winter MD (03/11 0707)      No acute osseous abnormality.      Findings are stable      Workstation performed: [id number]           Incidental Findings:   none     Test Results Pending at Discharge (will require follow up):   IgG 1,2,3,4  Celiac disease antibody profile  Giardia antigen  Calprotectin, fecal  Fecal pancreatic elastase     Outpatient  Tests Requested:  Screening colonoscopy    Complications:  none    Reason for Admission: hypoglycemia    Hospital Course:   Wes Araujo is a 54 y.o. male patient who originally presented to the hospital on 3/8/2024 due to Hypoglycemia to 34.  Patient was initially managed in the ICU for symptomatic hypoglycemia with subsequent improvement in his sugar levels.  It was noted that patient has strong history of medication noncompliance and had multiple previous episodes of hypoglycemia.  Once improved, patient was transferred to [facility] for further management.  While admitted, patient complained of chronic lower back pain with difficulty ambulating as a result of this.  Patient was started on a pain regimen and PT and OT were consulted who had recommended no rehab needs.     While the patient was admitted, patient was found to have an uptrending creatinine for which nephrology was consulted on the case.  Creatinine is stable at this time with imaging suggestive of medical renal disease.  Will be discharged home off of his lisinopril and patient will need to continue outpatient follow-up with nephrology.    Patient had also complained of fatty stools that had been going on for a long time.  Pancreatic insufficiency was suspected given his chronic alcohol use and imaging findings suggestive of chronic pancreatitis and calcifications in his pancreas.  GI was consulted on the case and had started the patient on Creon.  Patient will need to follow-up with GI on discharge for Creon titration.  It is also suggested that patient undergo screening colonoscopy.    Please see above list of diagnoses and related plan for additional information.     Condition at Discharge: stable    Discharge Day Visit / Exam:   Subjective: Patient seen and examined at bedside.  Denies any complaints at this time.  He is happy that he is being discharged today and states that he feels much better.  Back pain and diarrhea have improved.    Vitals:  "Blood Pressure: 132/75 (03/14/24 0706)  Pulse: 76 (03/14/24 0706)  Temperature: 98.1 °F (36.7 °C) (03/14/24 0706)  Temp Source: Oral (03/13/24 2255)  Respirations: 18 (03/13/24 2255)  Height: 5' 9\" (175.3 cm) (03/08/24 0931)  Weight - Scale: 57.9 kg (127 lb 10.3 oz) (03/14/24 0600)  SpO2: 98 % (03/14/24 0706)    Exam:   Physical Exam  Vitals and nursing note reviewed.   Constitutional:       General: He is not in acute distress.     Appearance: He is well-developed.   HENT:      Head: Normocephalic and atraumatic.   Eyes:      Conjunctiva/sclera: Conjunctivae normal.   Cardiovascular:      Rate and Rhythm: Normal rate and regular rhythm.      Heart sounds: No murmur heard.  Pulmonary:      Effort: Pulmonary effort is normal. No respiratory distress.      Breath sounds: Normal breath sounds.   Abdominal:      Palpations: Abdomen is soft.      Tenderness: There is no abdominal tenderness.   Musculoskeletal:         General: No swelling.      Cervical back: Neck supple.   Skin:     General: Skin is warm and dry.      Capillary Refill: Capillary refill takes less than 2 seconds.   Neurological:      Mental Status: He is alert.   Psychiatric:         Mood and Affect: Mood normal.        Discussion with Family:  patient.     Discharge instructions/Information to patient and family:   See after visit summary for information provided to patient and family.      Provisions for Follow-Up Care:  See after visit summary for information related to follow-up care and any pertinent home health orders.      Mobility at time of Discharge:   Basic Mobility Inpatient Raw Score: 24  JH-HLM Goal: 8: Walk 250 feet or more  JH-HLM Achieved: 8: Walk 250 feet ot more  HLM Goal achieved. Continue to encourage appropriate mobility.     Disposition:   Home    Planned Readmission: none     Discharge Statement:  I spent 60 minutes discharging the patient. This time was spent on the day of discharge. I had direct contact with the patient on the " day of discharge. Greater than 50% of the total time was spent examining patient, answering all patient questions, arranging and discussing plan of care with patient as well as directly providing post-discharge instructions.  Additional time then spent on discharge activities.    Discharge Medications:  See after visit summary for reconciled discharge medications provided to patient and/or family.      **Please Note: This note may have been constructed using a voice recognition system**

## 2024-03-14 NOTE — PLAN OF CARE
Problem: Potential for Falls  Goal: Patient will remain free of falls  Description: INTERVENTIONS:  - Educate patient/family on patient safety including physical limitations  - Instruct patient to call for assistance with activity   - Consult OT/PT to assist with strengthening/mobility   - Keep Call bell within reach  - Keep bed low and locked with side rails adjusted as appropriate  - Keep care items and personal belongings within reach  - Initiate and maintain comfort rounds  - Make Fall Risk Sign visible to staff  - Apply yellow socks and bracelet for high fall risk patients  - Consider moving patient to room near nurses station  Outcome: Progressing     Problem: PAIN - ADULT  Goal: Verbalizes/displays adequate comfort level or baseline comfort level  Description: Interventions:  - Encourage patient to monitor pain and request assistance  - Assess pain using appropriate pain scale  - Administer analgesics based on type and severity of pain and evaluate response  - Implement non-pharmacological measures as appropriate and evaluate response  - Consider cultural and social influences on pain and pain management  - Notify physician/advanced practitioner if interventions unsuccessful or patient reports new pain  Outcome: Progressing     Problem: INFECTION - ADULT  Goal: Absence or prevention of progression during hospitalization  Description: INTERVENTIONS:  - Assess and monitor for signs and symptoms of infection  - Monitor lab/diagnostic results  - Monitor all insertion sites, i.e. indwelling lines, tubes, and drains  - Monitor endotracheal if appropriate and nasal secretions for changes in amount and color  - [place] appropriate cooling/warming therapies per order  - Administer medications as ordered  - Instruct and encourage patient and family to use good hand hygiene technique  - Identify and instruct in appropriate isolation precautions for identified infection/condition  Outcome: Progressing  Goal: Absence  of fever/infection during neutropenic period  Description: INTERVENTIONS:  - Monitor WBC    Outcome: Progressing     Problem: SAFETY ADULT  Goal: Patient will remain free of falls  Description: INTERVENTIONS:  - Educate patient/family on patient safety including physical limitations  - Instruct patient to call for assistance with activity   - Consult OT/PT to assist with strengthening/mobility   - Keep Call bell within reach  - Keep bed low and locked with side rails adjusted as appropriate  - Keep care items and personal belongings within reach  - Initiate and maintain comfort rounds  - Make Fall Risk Sign visible to staff  - Apply yellow socks and bracelet for high fall risk patients  - Consider moving patient to room near nurses station  Outcome: Progressing  Goal: Maintain or return to baseline ADL function  Description: INTERVENTIONS:  - Educate patient/family on patient safety including physical limitations  - Instruct patient to call for assistance with activity   - Consult OT/PT to assist with strengthening/mobility   - Keep Call bell within reach  - Keep bed low and locked with side rails adjusted as appropriate  - Keep care items and personal belongings within reach  - Initiate and maintain comfort rounds  - Make Fall Risk Sign visible to staff  - Apply yellow socks and bracelet for high fall risk patients  - Consider moving patient to room near nurses station  Outcome: Progressing  Goal: Maintains/Returns to pre admission functional level  Description: INTERVENTIONS:  - Perform AM-PAC 6 Click Basic Mobility/ Daily Activity assessment daily.  - Set and communicate daily mobility goal to care team and patient/family/caregiver.   - Collaborate with rehabilitation services on mobility goals if consulted  - Perform Range of Motion  times a day.  - Reposition patient every  hours.  - Dangle patient  times a day  - Stand patient  times a day  - Ambulate patient  times a day  - Out of bed to chair  times a day    - Out of bed for meals times a day  - Out of bed for toileting  - Record patient progress and toleration of activity level   Outcome: Progressing     Problem: DISCHARGE PLANNING  Goal: Discharge to home or other facility with appropriate resources  Description: INTERVENTIONS:  - Identify barriers to discharge w/patient and caregiver  - Arrange for needed discharge resources and transportation as appropriate  - Identify discharge learning needs (meds, wound care, etc.)  - Arrange for interpretive services to assist at discharge as needed  - Refer to Case Management Department for coordinating discharge planning if the patient needs post-hospital services based on physician/advanced practitioner order or complex needs related to functional status, cognitive ability, or social support system  Outcome: Progressing     Problem: Knowledge Deficit  Goal: Patient/family/caregiver demonstrates understanding of disease process, treatment plan, medications, and discharge instructions  Description: Complete learning assessment and assess knowledge base.  Interventions:  - Provide teaching at level of understanding  - Provide teaching via preferred learning methods  Outcome: Progressing

## 2024-03-14 NOTE — ASSESSMENT & PLAN NOTE
Lab Results   Component Value Date    EGFR 51 03/14/2024    EGFR 44 03/13/2024    EGFR 45 03/12/2024    CREATININE 1.52 (H) 03/14/2024    CREATININE 1.72 (H) 03/13/2024    CREATININE 1.67 (H) 03/12/2024     Creatinine stable at this time, patient will need to continue outpatient follow-up with nephrology  Nephrology consulted, recommendations appreciated  I&O monitoring  Avoid nephrotoxins  Renal ultrasound showing Increased renal cortical echogenicity suggestive of medical renal disease.   Will continue to hold lisinopril on discharge

## 2024-03-14 NOTE — ASSESSMENT & PLAN NOTE
Discontinue lisinopril given ZENAIDA as per Nephro  Continue Norvasc 5 mg BID  Continue Hydralazine 25 mg every 8 hours  Patient recommended to follow-up with his primary care provider for further titration of blood pressure medications

## 2024-03-15 ENCOUNTER — TRANSITIONAL CARE MANAGEMENT (OUTPATIENT)
Dept: FAMILY MEDICINE CLINIC | Facility: CLINIC | Age: 54
End: 2024-03-15

## 2024-03-15 ENCOUNTER — TELEPHONE (OUTPATIENT)
Dept: NEPHROLOGY | Facility: CLINIC | Age: 54
End: 2024-03-15

## 2024-03-15 DIAGNOSIS — N17.9 ACUTE KIDNEY INJURY (HCC): Primary | ICD-10-CM

## 2024-03-15 LAB
ACETOHEXAMIDE SERPL-MCNC: NEGATIVE UG/ML (ref 20–60)
CHLORPROPAMIDE SERPL-MCNC: NEGATIVE UG/ML (ref 75–250)
ENDOMYSIUM IGA SER QL: NEGATIVE
GLIADIN PEPTIDE IGA SER-ACNC: 8 UNITS (ref 0–19)
GLIADIN PEPTIDE IGG SER-ACNC: 2 UNITS (ref 0–19)
GLIMEPIRIDE SERPL-MCNC: NEGATIVE NG/ML (ref 80–250)
GLIPIZIDE SERPL-MCNC: NEGATIVE NG/ML (ref 200–1000)
GLYBURIDE SERPL-MCNC: NEGATIVE NG/ML
IGA SERPL-MCNC: 331 MG/DL (ref 90–386)
IGG SERPL-MCNC: 908 MG/DL (ref 603–1613)
IGG1 SER-MCNC: 465 MG/DL (ref 248–810)
IGG2 SER-MCNC: 316 MG/DL (ref 130–555)
IGG3 SER-MCNC: 82 MG/DL (ref 15–102)
IGG4 SER-MCNC: 46 MG/DL (ref 2–96)
NATEGLINIDE SERPL-MCNC: NEGATIVE NG/ML
REPAGLINIDE SERPL-MCNC: NEGATIVE NG/ML
TOLAZAMIDE SERPL-MCNC: NEGATIVE UG/ML
TOLBUTAMIDE SERPL-MCNC: NEGATIVE UG/ML (ref 40–100)
TTG IGA SER-ACNC: <2 U/ML (ref 0–3)
TTG IGG SER-ACNC: <2 U/ML (ref 0–5)

## 2024-03-15 NOTE — TELEPHONE ENCOUNTER
----- Message from Kiran Madden MD sent at 3/14/2024  4:16 PM EDT -----  This patient was seen at [facility] for ZENAIDA  Please arrange for follow up in the office in 2-4 weeks.   Labs: BMP to be done 1 week after discharge.

## 2024-03-15 NOTE — UTILIZATION REVIEW
NOTIFICATION OF ADMISSION DISCHARGE   This is a Notification of Discharge from [facility]. Please be advised that this patient has been discharge from our facility. Below you will find the admission and discharge date and time including the patient’s disposition.   UTILIZATION REVIEW CONTACT:  Katie Almeida MA  Utilization   Network Utilization Review Department  Phone: [phone] x carefully listen to the prompts. All voicemails are confidential.  Email: [email or web address]     ADMISSION INFORMATION  PRESENTATION DATE: 3/8/2024  5:08 AM  OBERVATION ADMISSION DATE:   INPATIENT ADMISSION DATE: 3/8/24  8:42 AM   DISCHARGE DATE: 3/14/2024  3:26 PM   DISPOSITION:Home/Self Care    Network Utilization Review Department  ATTENTION: Please call with any questions or concerns to [phone] and carefully listen to the prompts so that you are directed to the right person. All voicemails are confidential.   For Discharge needs, contact Care Management DC Support Team at [phone] opt. 2  Send all requests for admission clinical reviews, approved or denied determinations and any other requests to dedicated fax number below belonging to the campus where the patient is receiving treatment. List of dedicated fax numbers for the Facilities:  FACILITY NAME UR FAX NUMBER   ADMISSION DENIALS (Administrative/Medical Necessity) [phone]   DISCHARGE SUPPORT TEAM ([facility]) [phone]   PARENT CHILD HEALTH (Maternity/NICU/Pediatrics) [phone]   [facility] [phone]   [facility] [phone]   [facility] [phone]   [facility] [phone]   [facility] [phone]   [facility] [phone]   [facility] [phone]   [facility] [phone]    [facility] [phone]   [facility] [phone]   [facility] [phone]   [facility] [phone]

## 2024-03-18 ENCOUNTER — OFFICE VISIT (OUTPATIENT)
Dept: FAMILY MEDICINE CLINIC | Facility: CLINIC | Age: 54
End: 2024-03-18

## 2024-03-18 ENCOUNTER — TELEPHONE (OUTPATIENT)
Dept: GASTROENTEROLOGY | Facility: CLINIC | Age: 54
End: 2024-03-18

## 2024-03-18 ENCOUNTER — TELEPHONE (OUTPATIENT)
Dept: FAMILY MEDICINE CLINIC | Facility: CLINIC | Age: 54
End: 2024-03-18

## 2024-03-18 VITALS
RESPIRATION RATE: 16 BRPM | WEIGHT: 137 LBS | OXYGEN SATURATION: 99 % | TEMPERATURE: 98 F | BODY MASS INDEX: 20.29 KG/M2 | SYSTOLIC BLOOD PRESSURE: 180 MMHG | HEART RATE: 82 BPM | DIASTOLIC BLOOD PRESSURE: 90 MMHG | HEIGHT: 69 IN

## 2024-03-18 DIAGNOSIS — I10 PRIMARY HYPERTENSION: ICD-10-CM

## 2024-03-18 DIAGNOSIS — Z76.89 ENCOUNTER FOR SUPPORT AND COORDINATION OF TRANSITION OF CARE: Primary | ICD-10-CM

## 2024-03-18 DIAGNOSIS — E11.65 TYPE 2 DIABETES MELLITUS WITH HYPERGLYCEMIA, WITHOUT LONG-TERM CURRENT USE OF INSULIN (HCC): ICD-10-CM

## 2024-03-18 DIAGNOSIS — E11.00 TYPE 2 DIABETES MELLITUS WITH HYPEROSMOLARITY WITHOUT COMA, WITHOUT LONG-TERM CURRENT USE OF INSULIN (HCC): ICD-10-CM

## 2024-03-18 PROCEDURE — 99496 TRANSJ CARE MGMT HIGH F2F 7D: CPT | Performed by: FAMILY MEDICINE

## 2024-03-18 RX ORDER — PEN NEEDLE, DIABETIC 32GX 5/32"
NEEDLE, DISPOSABLE MISCELLANEOUS
Qty: 100 EACH | Refills: 0 | Status: SHIPPED | OUTPATIENT
Start: 2024-03-18

## 2024-03-18 RX ORDER — HUMAN INSULIN 100 [IU]/ML
5 INJECTION, SUSPENSION SUBCUTANEOUS
Qty: 15 ML | Refills: 3 | Status: SHIPPED | OUTPATIENT
Start: 2024-03-18

## 2024-03-18 RX ORDER — INSULIN ASPART 100 [IU]/ML
5 INJECTION, SUSPENSION SUBCUTANEOUS 2 TIMES DAILY WITH MEALS
Qty: 15 ML | Refills: 3 | Status: SHIPPED | OUTPATIENT
Start: 2024-03-18

## 2024-03-18 RX ORDER — BLOOD-GLUCOSE SENSOR
1 EACH MISCELLANEOUS
Qty: 2 EACH | Refills: 10 | Status: SHIPPED | OUTPATIENT
Start: 2024-03-18

## 2024-03-18 NOTE — TELEPHONE ENCOUNTER
----- Message from Allegra Goode sent at 3/18/2024  7:03 AM EDT -----    ----- Message -----  From: Igor Ramesh MD  Sent: 3/13/2024   2:26 PM EDT  To: #    Hi,.    Can you please schedule this patient for clinic f/u with any available provider in 3-4 weeks?    Thanks,  Igor

## 2024-03-18 NOTE — TELEPHONE ENCOUNTER
Saw patient in clinic today with PCP.    Patient is brittle diabetic.  Discussed that C-peptide indicates that patient is not making insulin, likely all other type 2 diabetes therapies besides insulin will not be effective    He is very sensitive to insulin and has had multiple admissions to the hospital with hypoglycemia.  It is very likely also related to chronic alcohol abuse    Patient has greatly uncontrolled postprandial rise he needs some form of prandial insulin, however patient struggles with adherence    Recommend patient use a continuous glucose monitor and 70/30 insulin dosed before breakfast and before supper to optimize glycemic awareness and simplify regimen    Counseled patient on the common signs/symptoms of hypoglycemia such as possible dizziness, blurred vision, hunger, sweatiness. Explained these symptoms usually start when BG approaches or drops below 70 ng/dL. Reviewed responsible hypoglycemia treatment and the rule of 15s. Have 15 grams of fast acting carbohydrates such as glucose tablets, hard candy, or orange juice to correct low BG. Check blood sugar again in 15 minutes. Repeat these steps until needed until blood glucose is stable > 70 ng/dL.      Freestyle Pablo 3 CGM successfully placed on patient   Skin cleaned with alcohol  Unscrew applicator; peel off sensor  Combine applicator/sensor by lining up the 2 lines and push until it clicks  Sensor inserted and started   Insertion Site: right arm  Communication with phone verified  Patient instructions reviewed:   Sensor is waterproof for showering and swimming  Remove for MRI, CT  Remove if redness, bleeding, swelling, discomfort at site  Replace sensor in 14 days  Scan every 8 hours for blood sugar  Glucometer verification: confirm CGM reading if needed  LibreView: provided information on syncing LibreView with PCP practice     Verify that the NovoLog 70/30 was available on his formulary.  Translated completed with year 1 resident   Cameron.    Pharmacist Tracking Tool  Reason For Outreach: Embedded Pharmacist  Demographics:  Intervention Method: In Person  Type of Intervention: Follow-Up  Topics Addressed: Diabetes  Pharmacologic Interventions: Medication Initiation and Dose or Frequency Adjusted  Non-Pharmacologic Interventions: Adherence addressed, Care coordination, Chart update, Medication/Device education, and Personal CGM  Time:  Direct Patient Care:  30  mins  Care Coordination:  10  mins  Recommendation Recipient: Patient/Caregiver  Outcome: Accepted     Jessica JosephD, BCACP  Ambulatory Care Clinical Pharmacist

## 2024-03-20 NOTE — ASSESSMENT & PLAN NOTE
Lab Results   Component Value Date    HGBA1C 15.7 (H) 01/26/2024     With low C-peptide, doubt that patient will be able to tolerate any oral diabetic agent.  Point-of-care glucose 300 in the office.  Discontinued Jardiance and Trulicity at this time.  Patient is having a difficulty adhering to his nighttime Lantus 10 units nightly.  Would benefit from initiation of therapy of 70/30 5 units twice daily.  During our visit, we also instructed patient on how to use his freestyle chris to monitor his sugar.  Will have patient follow-up with ambulatory pharmacist next week and his PCP subsequently after that.  Can consider uptitrating the medication for goal of sugar between 130-200.

## 2024-03-20 NOTE — ASSESSMENT & PLAN NOTE
BP Readings from Last 3 Encounters:   03/18/24 (!) 180/90   03/14/24 132/75   02/12/24 150/80     Was previously following with nephrology, however lost to follow-up.  Admittedly, his home regiment is somewhat complicated with Norvasc 5 mg twice daily and hydralazine 25 mg every 8.  In subsequent visit, will consider doing Procardia 30 mg XR daily.  For now, encourage patient to continue home medication regimen.

## 2024-03-20 NOTE — PROGRESS NOTES
Assessment & Plan     1. Encounter for support and coordination of transition of care  Comments:  Involved strong diabetic education with new PCP Dr. Barnes,  myself, and ambulatory pharmacist.  Follow-up next week with ambulatory.    2. Type 2 diabetes mellitus with hyperglycemia, without long-term current use of insulin (Formerly [facility])  Assessment & Plan:    Lab Results   Component Value Date    HGBA1C 15.7 (H) 01/26/2024     With low C-peptide, doubt that patient will be able to tolerate any oral diabetic agent.  Point-of-care glucose 300 in the office.  Discontinued Jardiance and Trulicity at this time.  Patient is having a difficulty adhering to his nighttime Lantus 10 units nightly.  Would benefit from initiation of therapy of 70/30 5 units twice daily.  During our visit, we also instructed patient on how to use his freestyle pablo to monitor his sugar.  Will have patient follow-up with ambulatory pharmacist next week and his PCP subsequently after that.  Can consider uptitrating the medication for goal of sugar between 130-200.    Orders:  -     Continuous Blood Gluc Sensor (FreeStyle Pablo 3 Sensor) MISC; Use 1 each every 14 (fourteen) days  -     insulin isophane-insulin regular (NovoLIN 70/30 FlexPen) 100 units/mL injection pen; Inject 5 Units under the skin 2 (two) times a day before meals  -     pancrelipase, Lip-Prot-Amyl, (CREON) 24,000 units; Take 24,000 units of lipase by mouth 3 (three) times a day with meals  -     insulin aspart protamine-insulin aspart (NovoLOG Mix 70/30 FlexPen) 100 Units/mL injection pen; Inject 5 Units under the skin 2 (two) times a day with meals  -     Ambulatory referral to clinical pharmacy; Future    3. Type 2 diabetes mellitus with hyperosmolarity without coma, without long-term current use of insulin (HCC)  Assessment & Plan:    Lab Results   Component Value Date    HGBA1C 15.7 (H) 01/26/2024     With low C-peptide, doubt that patient will be able to tolerate any oral diabetic  agent.  Point-of-care glucose 300 in the office.  Discontinued Jardiance and Trulicity at this time.  Patient is having a difficulty adhering to his nighttime Lantus 10 units nightly.  Would benefit from initiation of therapy of 70/30 5 units twice daily.  During our visit, we also instructed patient on how to use his freestyle chris to monitor his sugar.  Will have patient follow-up with ambulatory pharmacist next week and his PCP subsequently after that.  Can consider uptitrating the medication for goal of sugar between 130-200.    Orders:  -     Insulin Pen Needle (BD Pen Needle Claudia 2nd Gen) 32G X 4 MM MISC; For use with insulin pen. Pharmacy may dispense brand covered by insurance.    4. Primary hypertension  Assessment & Plan:  BP Readings from Last 3 Encounters:   03/18/24 (!) 180/90   03/14/24 132/75   02/12/24 150/80     Was previously following with nephrology, however lost to follow-up.  Admittedly, his home regiment is somewhat complicated with Norvasc 5 mg twice daily and hydralazine 25 mg every 8.  In subsequent visit, will consider doing Procardia 30 mg XR daily.  For now, encourage patient to continue home medication regimen.             Subjective     Transitional Care Management Review:   Wes Araujo is a 54 y.o. male here for TCM follow up.     During the TCM phone call patient stated:  TCM Call       Date and time call was made  3/15/2024  9:33 AM    Hospital care reviewed  Records reviewed    Patient was hospitialized at  [facility]    Date of Admission  03/08/24    Date of discharge  03/14/24    Diagnosis  HYPOGLYCEMIA    Disposition  Home    Were the patients medications reviewed and updated  No    Current Symptoms  None          TCM Call       Post hospital issues  None    Should patient be enrolled in anticoag monitoring?  No    Scheduled for follow up?  Yes    Did you obtain your prescribed medications  Yes    Do you need help managing your prescriptions or medications  No     "Is transportation to your appointment needed  No    I have advised the patient to call PCP with any new or worsening symptoms  THAO POLANCO    Living Arrangements  Family members    Are you recieving any outpatient services  No    Are you recieving home care services  No    Are you using any community resources  No    Current waiver services  No    Have you fallen in the last 12 months  No    Interperter language line needed  No    Counseling  Patient          Past medical history notable for Klinefelter's X XY, type 2 diabetes uncontrolled currently on insulin, CKD stage III, hypertension is coming in after recent ED visit where he was hospitalized for hypoglycemia.  Of note, patient was hospitalized 3 times since January for hypoglycemia secondary to exogenous insulin administration.  Etiology likely secondary to superimposing alcohol use disorder causing him to administer the incorrect amount of insulin per his report.  Patient has not had any insulin since his discharge from the hospital.  Patient denies any polyuria, polydipsia, or polyphagia.      Review of Systems   Constitutional:  Negative for chills and fever.   HENT:  Negative for ear pain and sore throat.    Eyes:  Negative for pain and visual disturbance.   Respiratory:  Negative for cough and shortness of breath.    Cardiovascular:  Negative for chest pain and palpitations.   Gastrointestinal:  Negative for abdominal pain and vomiting.   Genitourinary:  Negative for dysuria and hematuria.   Musculoskeletal:  Negative for arthralgias and back pain.   Skin:  Negative for color change and rash.   Neurological:  Negative for seizures and syncope.   All other systems reviewed and are negative.      Objective     BP (!) 180/90 (BP Location: Right arm, Patient Position: Sitting, Cuff Size: Standard)   Pulse 82   Temp 98 °F (36.7 °C) (Temporal)   Resp 16   Ht 5' 9\" (1.753 m)   Wt 62.1 kg (137 lb)   SpO2 99%   BMI 20.23 kg/m²      Physical Exam  Vitals " and nursing note reviewed.   Constitutional:       General: He is not in acute distress.     Appearance: He is well-developed.   HENT:      Head: Normocephalic and atraumatic.   Eyes:      Conjunctiva/sclera: Conjunctivae normal.   Cardiovascular:      Rate and Rhythm: Normal rate and regular rhythm.      Heart sounds: No murmur heard.  Pulmonary:      Effort: Pulmonary effort is normal. No respiratory distress.      Breath sounds: Normal breath sounds.   Abdominal:      Palpations: Abdomen is soft.      Tenderness: There is no abdominal tenderness.   Musculoskeletal:         General: No swelling.      Cervical back: Neck supple.   Skin:     General: Skin is warm and dry.      Capillary Refill: Capillary refill takes less than 2 seconds.   Neurological:      Mental Status: He is alert.   Psychiatric:         Mood and Affect: Mood normal.       Medications have been reviewed by provider in current encounter    Stuart Mata DO

## 2024-03-21 NOTE — TELEPHONE ENCOUNTER
Called pt to schedule a hospital follow up and his father stated pt is not home but will rely the message.

## 2024-04-10 ENCOUNTER — HOSPITAL ENCOUNTER (EMERGENCY)
Facility: HOSPITAL | Age: 54
Discharge: HOME | End: 2024-04-10
Attending: EMERGENCY MEDICINE
Payer: COMMERCIAL

## 2024-04-10 VITALS
HEART RATE: 74 BPM | SYSTOLIC BLOOD PRESSURE: 208 MMHG | TEMPERATURE: 97.9 F | DIASTOLIC BLOOD PRESSURE: 98 MMHG | OXYGEN SATURATION: 99 % | RESPIRATION RATE: 16 BRPM

## 2024-04-10 DIAGNOSIS — R79.89 ELEVATED LFTS: ICD-10-CM

## 2024-04-10 DIAGNOSIS — F10.920 ALCOHOLIC INTOXICATION WITHOUT COMPLICATION (HCC): ICD-10-CM

## 2024-04-10 DIAGNOSIS — R73.9 HYPERGLYCEMIA: Primary | ICD-10-CM

## 2024-04-10 LAB
ALBUMIN SERPL BCP-MCNC: 3.3 G/DL (ref 3.5–5)
ALP SERPL-CCNC: 532 U/L (ref 34–104)
ALT SERPL W P-5'-P-CCNC: 151 U/L (ref 7–52)
ANION GAP SERPL CALCULATED.3IONS-SCNC: 9 MMOL/L (ref 4–13)
AST SERPL W P-5'-P-CCNC: 90 U/L (ref 13–39)
B-OH-BUTYR SERPL-MCNC: <0.05 MMOL/L (ref 0.02–0.27)
BASE EX.OXY STD BLDV CALC-SCNC: 82.7 % (ref 60–80)
BASE EXCESS BLDV CALC-SCNC: -2.1 MMOL/L
BASOPHILS # BLD AUTO: 0.05 THOUSANDS/ÂΜL (ref 0–0.1)
BASOPHILS NFR BLD AUTO: 1 % (ref 0–1)
BILIRUB SERPL-MCNC: 0.68 MG/DL (ref 0.2–1)
BUN SERPL-MCNC: 26 MG/DL (ref 5–25)
CALCIUM ALBUM COR SERPL-MCNC: 8.7 MG/DL (ref 8.3–10.1)
CALCIUM SERPL-MCNC: 8.1 MG/DL (ref 8.4–10.2)
CHLORIDE SERPL-SCNC: 104 MMOL/L (ref 96–108)
CO2 SERPL-SCNC: 20 MMOL/L (ref 21–32)
CREAT SERPL-MCNC: 1.59 MG/DL (ref 0.6–1.3)
EOSINOPHIL # BLD AUTO: 0.31 THOUSAND/ÂΜL (ref 0–0.61)
EOSINOPHIL NFR BLD AUTO: 4 % (ref 0–6)
ERYTHROCYTE [DISTWIDTH] IN BLOOD BY AUTOMATED COUNT: 12.2 % (ref 11.6–15.1)
ETHANOL SERPL-MCNC: 206 MG/DL
GFR SERPL CREATININE-BSD FRML MDRD: 48 ML/MIN/1.73SQ M
GLUCOSE SERPL-MCNC: 354 MG/DL (ref 65–140)
GLUCOSE SERPL-MCNC: 395 MG/DL (ref 65–140)
HCO3 BLDV-SCNC: 21.8 MMOL/L (ref 24–30)
HCT VFR BLD AUTO: 33.2 % (ref 36.5–49.3)
HGB BLD-MCNC: 11.9 G/DL (ref 12–17)
IMM GRANULOCYTES # BLD AUTO: 0.02 THOUSAND/UL (ref 0–0.2)
IMM GRANULOCYTES NFR BLD AUTO: 0 % (ref 0–2)
LYMPHOCYTES # BLD AUTO: 3.07 THOUSANDS/ÂΜL (ref 0.6–4.47)
LYMPHOCYTES NFR BLD AUTO: 39 % (ref 14–44)
MCH RBC QN AUTO: 31.2 PG (ref 26.8–34.3)
MCHC RBC AUTO-ENTMCNC: 35.8 G/DL (ref 31.4–37.4)
MCV RBC AUTO: 87 FL (ref 82–98)
MONOCYTES # BLD AUTO: 0.42 THOUSAND/ÂΜL (ref 0.17–1.22)
MONOCYTES NFR BLD AUTO: 5 % (ref 4–12)
NEUTROPHILS # BLD AUTO: 4.04 THOUSANDS/ÂΜL (ref 1.85–7.62)
NEUTS SEG NFR BLD AUTO: 51 % (ref 43–75)
NRBC BLD AUTO-RTO: 0 /100 WBCS
O2 CT BLDV-SCNC: 15 ML/DL
PCO2 BLDV: 34.4 MM HG (ref 42–50)
PH BLDV: 7.42 [PH] (ref 7.3–7.4)
PLATELET # BLD AUTO: 229 THOUSANDS/UL (ref 149–390)
PMV BLD AUTO: 10.6 FL (ref 8.9–12.7)
PO2 BLDV: 47.6 MM HG (ref 35–45)
POTASSIUM SERPL-SCNC: 5.4 MMOL/L (ref 3.5–5.3)
PROT SERPL-MCNC: 6.5 G/DL (ref 6.4–8.4)
RBC # BLD AUTO: 3.82 MILLION/UL (ref 3.88–5.62)
SODIUM SERPL-SCNC: 133 MMOL/L (ref 135–147)
WBC # BLD AUTO: 7.91 THOUSAND/UL (ref 4.31–10.16)

## 2024-04-10 PROCEDURE — 82077 ASSAY SPEC XCP UR&BREATH IA: CPT | Performed by: EMERGENCY MEDICINE

## 2024-04-10 PROCEDURE — 36415 COLL VENOUS BLD VENIPUNCTURE: CPT | Performed by: EMERGENCY MEDICINE

## 2024-04-10 PROCEDURE — 82948 REAGENT STRIP/BLOOD GLUCOSE: CPT

## 2024-04-10 PROCEDURE — 82805 BLOOD GASES W/O2 SATURATION: CPT | Performed by: EMERGENCY MEDICINE

## 2024-04-10 PROCEDURE — 80053 COMPREHEN METABOLIC PANEL: CPT | Performed by: EMERGENCY MEDICINE

## 2024-04-10 PROCEDURE — 82010 KETONE BODYS QUAN: CPT | Performed by: EMERGENCY MEDICINE

## 2024-04-10 PROCEDURE — 85025 COMPLETE CBC W/AUTO DIFF WBC: CPT | Performed by: EMERGENCY MEDICINE

## 2024-04-10 RX ADMIN — SODIUM CHLORIDE, SODIUM LACTATE, POTASSIUM CHLORIDE, AND CALCIUM CHLORIDE 1000 ML: .6; .31; .03; .02 INJECTION, SOLUTION INTRAVENOUS at 01:37

## 2024-04-10 NOTE — ED PROVIDER NOTES
"History  Chief Complaint   Patient presents with    Hyperglycemia - Symptomatic     Patient BIBA, per EMS pt approached them and said he wasn't feeling well. Complaining of lower back pain. Per EMS, .     54-year-old male who presents with \"not feeling well\".  Patient is [place]-speaking only so the  line was used.  Around 4 PM, patient states that he \"was not feeling well\".  Felt like his sugars were high.  States that he is taking his medications as prescribed.  History difficult because patient is hard of hearing.        Prior to Admission Medications   Prescriptions Last Dose Informant Patient Reported? Taking?   Alcohol Swabs 70 % PADS   No No   Sig: May substitute brand based on insurance coverage. Check glucose TID.   Blood Glucose Monitoring Suppl (OneTouch Verio Reflect) w/Device KIT   No No   Sig: May substitute brand based on insurance coverage. Check glucose TID.   Continuous Blood Gluc Sensor (FreeStyle Pablo 3 Sensor) MISC   No No   Sig: Use 1 each every 14 (fourteen) days   Insulin Pen Needle (BD Pen Needle Claudia 2nd Gen) 32G X 4 MM MISC   No No   Sig: For use with insulin pen. Pharmacy may dispense brand covered by insurance.   Multiple Vitamin (multivitamin) tablet   No No   Sig: Take 1 tablet by mouth daily   OneTouch Delica Lancets 33G MISC   No No   Sig: May substitute brand based on insurance coverage. Check glucose TID.   amLODIPine (NORVASC) 5 mg tablet   No No   Sig: Take 1 tablet (5 mg total) by mouth 2 (two) times a day   ergocalciferol (VITAMIN D2) 50,000 units   No No   Sig: Take 1 capsule (50,000 Units total) by mouth once a week for 11 doses   glucose blood (OneTouch Verio) test strip   No No   Sig: May substitute brand based on insurance coverage. Check glucose TID.   hydrALAZINE (APRESOLINE) 25 mg tablet   No No   Sig: Take 1 tablet (25 mg total) by mouth every 8 (eight) hours   insulin aspart protamine-insulin aspart (NovoLOG Mix 70/30 FlexPen) 100 Units/mL injection " pen   No No   Sig: Inject 5 Units under the skin 2 (two) times a day with meals   insulin isophane-insulin regular (NovoLIN 70/30 FlexPen) 100 units/mL injection pen   No No   Sig: Inject 5 Units under the skin 2 (two) times a day before meals   methocarbamol (ROBAXIN) 500 mg tablet   No No   Sig: Take 1 tablet (500 mg total) by mouth every 6 (six) hours as needed for muscle spasms   pancrelipase, Lip-Prot-Amyl, (CREON) 24,000 units   No No   Sig: Take 2 capsules (48,000 Units total) by mouth 3 (three) times a day with meals   pancrelipase, Lip-Prot-Amyl, (CREON) 24,000 units   No No   Sig: Take 24,000 units of lipase by mouth 3 (three) times a day with meals      Facility-Administered Medications: None       Past Medical History:   Diagnosis Date    Alcohol abuse     Chronic pancreatitis (HCC)     Diabetes mellitus (HCC)     Type 2    Pancreatitis     Paroxysmal A-fib (HCC)     Thrombocytopenia (HCC)        Past Surgical History:   Procedure Laterality Date    INCISION AND DRAINAGE OF WOUND N/A 8/16/2020    Procedure: INCISION AND DRAINAGE (I&D) HEAD/FACE;  Surgeon: Estrada Walton DMD;  Location: [facility];  Service: Maxillofacial    IR BIOPSY BONE MARROW  2/7/2019    REMOVAL OF IMPACTED TOOTH - COMPLETELY BONY N/A 8/16/2020    Procedure: EXTRACTION TEETH MULTIPLE #2, 3;  Surgeon: Estrada Walton DMD;  Location: [facility];  Service: Maxillofacial       Family History   Problem Relation Age of Onset    Diabetes Mother     Hypertension Mother     Hyperlipidemia Father      I have reviewed and agree with the history as documented.    E-Cigarette/Vaping    E-Cigarette Use Never User      E-Cigarette/Vaping Substances     Social History     Tobacco Use    Smoking status: Former     Passive exposure: Past    Smokeless tobacco: Never   Vaping Use    Vaping status: Never Used   Substance Use Topics    Alcohol use: Yes    Drug use: Yes     Types: Cocaine       Review of Systems   Constitutional:  Negative for chills, fatigue  and fever.   HENT:  Negative for rhinorrhea, sore throat and trouble swallowing.    Eyes:  Negative for photophobia and visual disturbance.   Respiratory:  Negative for cough, chest tightness and shortness of breath.    Cardiovascular:  Negative for chest pain, palpitations and leg swelling.   Gastrointestinal:  Negative for abdominal pain, blood in stool, diarrhea, nausea and vomiting.   Endocrine: Negative for polyuria.   Genitourinary:  Negative for dysuria, flank pain and hematuria.   Musculoskeletal:  Negative for back pain and neck pain.   Skin:  Negative for color change and rash.   Allergic/Immunologic: Negative for immunocompromised state.   Neurological:  Negative for dizziness, weakness, light-headedness, numbness and headaches.   All other systems reviewed and are negative.      Physical Exam  Physical Exam  Vitals and nursing note reviewed.   Constitutional:       General: He is not in acute distress.     Appearance: He is well-developed.      Comments: Chronically ill-appearing.  Smells of alcohol.   HENT:      Head: Normocephalic and atraumatic.      Mouth/Throat:      Lips: Pink.      Mouth: Mucous membranes are moist.   Eyes:      General: Lids are normal.      Extraocular Movements: Extraocular movements intact.      Conjunctiva/sclera: Conjunctivae normal.      Pupils: Pupils are equal, round, and reactive to light.   Cardiovascular:      Rate and Rhythm: Normal rate and regular rhythm.      Heart sounds: Normal heart sounds. No murmur heard.  Pulmonary:      Effort: Pulmonary effort is normal.      Breath sounds: Normal breath sounds.   Abdominal:      General: There is no distension.      Palpations: Abdomen is soft.      Tenderness: There is no abdominal tenderness. There is no guarding or rebound.   Musculoskeletal:         General: No swelling.      Cervical back: Full passive range of motion without pain, normal range of motion and neck supple.   Skin:     General: Skin is warm.       Capillary Refill: Capillary refill takes less than 2 seconds.      Findings: No rash.   Neurological:      General: No focal deficit present.      Mental Status: He is alert.   Psychiatric:         Mood and Affect: Mood normal.         Speech: Speech normal.         Behavior: Behavior normal.         Vital Signs  ED Triage Vitals [04/10/24 0047]   Temperature Pulse Respirations Blood Pressure SpO2   97.9 °F (36.6 °C) 76 18 (!) 188/103 100 %      Temp Source Heart Rate Source Patient Position - Orthostatic VS BP Location FiO2 (%)   Oral Monitor Lying Right arm --      Pain Score       10 - Worst Possible Pain           Vitals:    04/10/24 0047 04/10/24 0200   BP: (!) 188/103 (!) 214/101   Pulse: 76 75   Patient Position - Orthostatic VS: Lying          Visual Acuity      ED Medications  Medications   lactated ringers bolus 1,000 mL (1,000 mL Intravenous New Bag 4/10/24 0137)       Diagnostic Studies  Results Reviewed       Procedure Component Value Units Date/Time    Beta Hydroxybutyrate [[id number]]  (Normal) Collected: 04/10/24 0136    Lab Status: Final result Specimen: Blood from Arm, Left Updated: 04/10/24 0203     Beta- Hydroxybutyrate <0.05 mmol/L     Comprehensive metabolic panel [[id number]]  (Abnormal) Collected: 04/10/24 0136    Lab Status: Final result Specimen: Blood from Arm, Left Updated: 04/10/24 0203     Sodium 133 mmol/L      Potassium 5.4 mmol/L      Chloride 104 mmol/L      CO2 20 mmol/L      ANION GAP 9 mmol/L      BUN 26 mg/dL      Creatinine 1.59 mg/dL      Glucose 395 mg/dL      Calcium 8.1 mg/dL      Corrected Calcium 8.7 mg/dL      AST 90 U/L       U/L      Alkaline Phosphatase 532 U/L      Total Protein 6.5 g/dL      Albumin 3.3 g/dL      Total Bilirubin 0.68 mg/dL      eGFR 48 ml/min/1.73sq m     Narrative:      Specimen is hemolyzed,results may be affected  National Kidney Disease Foundation guidelines for Chronic Kidney Disease (CKD):     Stage 1 with normal or high GFR (GFR > 90  mL/min/1.73 square meters)    Stage 2 Mild CKD (GFR = 60-89 mL/min/1.73 square meters)    Stage 3A Moderate CKD (GFR = 45-59 mL/min/1.73 square meters)    Stage 3B Moderate CKD (GFR = 30-44 mL/min/1.73 square meters)    Stage 4 Severe CKD (GFR = 15-29 mL/min/1.73 square meters)    Stage 5 End Stage CKD (GFR <15 mL/min/1.73 square meters)  Note: GFR calculation is accurate only with a steady state creatinine    Ethanol [[id number]]  (Abnormal) Collected: 04/10/24 0136    Lab Status: Final result Specimen: Blood from Arm, Left Updated: 04/10/24 0201     Ethanol Lvl 206 mg/dL     CBC and differential [[id number]]  (Abnormal) Collected: 04/10/24 0136    Lab Status: Final result Specimen: Blood from Arm, Left Updated: 04/10/24 0144     WBC 7.91 Thousand/uL      RBC 3.82 Million/uL      Hemoglobin 11.9 g/dL      Hematocrit 33.2 %      MCV 87 fL      MCH 31.2 pg      MCHC 35.8 g/dL      RDW 12.2 %      MPV 10.6 fL      Platelets 229 Thousands/uL      nRBC 0 /100 WBCs      Segmented % 51 %      Immature Grans % 0 %      Lymphocytes % 39 %      Monocytes % 5 %      Eosinophils Relative 4 %      Basophils Relative 1 %      Absolute Neutrophils 4.04 Thousands/µL      Absolute Immature Grans 0.02 Thousand/uL      Absolute Lymphocytes 3.07 Thousands/µL      Absolute Monocytes 0.42 Thousand/µL      Eosinophils Absolute 0.31 Thousand/µL      Basophils Absolute 0.05 Thousands/µL     Blood gas, venous [[id number]]  (Abnormal) Collected: 04/10/24 0136    Lab Status: Final result Specimen: Blood from Arm, Left Updated: 04/10/24 0144     pH, Sami 7.419     pCO2, Sami 34.4 mm Hg      pO2, Sami 47.6 mm Hg      HCO3, Sami 21.8 mmol/L      Base Excess, Sami -2.1 mmol/L      O2 Content, Sami 15.0 ml/dL      O2 HGB, VENOUS 82.7 %     Fingerstick Glucose (POCT) [[id number]]  (Abnormal) Collected: 04/10/24 0047    Lab Status: Final result Specimen: Blood Updated: 04/10/24 0048     POC Glucose 354 mg/dl                    No orders to display               Procedures  Procedures         ED Course  ED Course as of 04/10/24 0224   Wed Apr 10, 2024   0208 Creatinine(!): 1.59  chronic   0208 LFTs have been elevated in the past.  No abdominal pain.                               SBIRT 20yo+      Flowsheet Row Most Recent Value   Initial Alcohol Screen: US AUDIT-C     1. How often do you have a drink containing alcohol? 3 Filed at: 04/10/2024 0053   2. How many drinks containing alcohol do you have on a typical day you are drinking?  5 Filed at: 04/10/2024 0053   3a. Male UNDER 65: How often do you have five or more drinks on one occasion? 3 Filed at: 04/10/2024 0053   Audit-C Score 11 Filed at: 04/10/2024 0053   Full Alcohol Screen: US AUDIT    4. How often during the last year have you found that you were not able to stop drinking once you had started? 0 Filed at: 04/10/2024 0053   5. How often during past year have you failed to do what was normally expected of you because of drinking?  0 Filed at: 04/10/2024 0053   6. How often in past year have you needed a first drink in the morning to get yourself going after a heavy drinking session?  0 Filed at: 04/10/2024 0053   7. How often in past year have you had feeling of guilt or remorse after drinking?  0 Filed at: 04/10/2024 0053   8. How often in past year have you been unable to remember what happened night before because you had been drinking?  0 Filed at: 04/10/2024 0053   9. Have you or someone else been injured as a result of your drinking?  0 Filed at: 04/10/2024 0053   10. Has a relative, friend, doctor or other health worker been concerned about your drinking and suggested you cut down?  0 Filed at: 04/10/2024 0053   AUDIT Total Score 11 Filed at: 04/10/2024 0053   MUSA: How many times in the past year have you...    Used an illegal drug or used a prescription medication for non-medical reasons? Never Filed at: 04/10/2024 0053                      Medical Decision Making  Likely alcohol intoxication.   Consider DKA versus ZENAIDA versus electrolyte abnormalities.  -CBC to evaluate for evidence of marked leukocytosis or anemia.  -CMP to evaluate electrolytes, renal function, anion gap.  -Beta hydroxybutyrate to evaluate for ketosis.  -VBG to evaluate for acidemia.  -Ethanol level to evaluate for alcohol intoxication.  -Will give 1 L IV fluids.    Problems Addressed:  Alcoholic intoxication without complication (HCC): complicated acute illness or injury with systemic symptoms  Elevated LFTs: chronic illness or injury  Hyperglycemia: chronic illness or injury with exacerbation, progression, or side effects of treatment    Amount and/or Complexity of Data Reviewed  Labs: ordered. Decision-making details documented in ED Course.             Disposition  Final diagnoses:   Hyperglycemia   Alcoholic intoxication without complication (HCC)   Elevated LFTs     Time reflects when diagnosis was documented in both MDM as applicable and the Disposition within this note       Time User Action Codes Description Comment    4/10/2024  2:23 AM Isma Mejia Add [R73.9] Hyperglycemia     4/10/2024  2:23 AM Isma Mejia Add [F10.920] Alcoholic intoxication without complication (HCC)     4/10/2024  2:23 AM Isma Mejia Add [R79.89] Elevated LFTs           ED Disposition       ED Disposition   Discharge    Condition   Stable    Date/Time   Wed Apr 10, 2024 0223    Comment   Wes Araujo discharge to home/self care.                   Follow-up Information       Follow up With Specialties Details Why Contact Info Additional Information    [facility] Emergency Department Emergency Medicine Go to  If symptoms worsen [address] [place] [id number]  [phone] [facility] Emergency Department, [address] [place] [address], [phone]    [facility] Family Medicine Schedule an appointment as soon as possible for a visit   [address] [place] [address]  101  [facility] [address] [phone] [facility] [place], [address] [facility], [address], [place], [place], [address] [phone]            Patient's Medications   Discharge Prescriptions    No medications on file       No discharge procedures on file.    PDMP Review         Value Time User    PDMP Reviewed  Yes 8/18/2020 11:39 AM Anthony Avila MD            ED Provider  Electronically Signed by             Isma Mejia MD  04/10/24 0224

## 2024-10-22 ENCOUNTER — HOSPITAL ENCOUNTER (INPATIENT)
Facility: HOSPITAL | Age: 54
LOS: 4 days | Discharge: HOME | DRG: 053 | End: 2024-10-26
Attending: EMERGENCY MEDICINE | Admitting: INTERNAL MEDICINE
Payer: COMMERCIAL

## 2024-10-22 DIAGNOSIS — F10.139 ALCOHOL ABUSE WITH WITHDRAWAL (HCC): ICD-10-CM

## 2024-10-22 DIAGNOSIS — F10.939 ALCOHOL WITHDRAWAL (HCC): ICD-10-CM

## 2024-10-22 DIAGNOSIS — R56.9 SEIZURE (HCC): ICD-10-CM

## 2024-10-22 DIAGNOSIS — E87.20 ACIDOSIS, LACTIC: ICD-10-CM

## 2024-10-22 DIAGNOSIS — E11.9 TYPE 2 DIABETES MELLITUS (HCC): ICD-10-CM

## 2024-10-22 DIAGNOSIS — R73.9 HYPERGLYCEMIA: Primary | ICD-10-CM

## 2024-10-22 DIAGNOSIS — E11.65 TYPE 2 DIABETES MELLITUS WITH HYPERGLYCEMIA, WITHOUT LONG-TERM CURRENT USE OF INSULIN (HCC): ICD-10-CM

## 2024-10-22 DIAGNOSIS — G92.8 TOXIC METABOLIC ENCEPHALOPATHY: ICD-10-CM

## 2024-10-22 PROBLEM — I16.0 HYPERTENSIVE URGENCY: Status: ACTIVE | Noted: 2024-10-22

## 2024-10-22 LAB
ALBUMIN SERPL BCG-MCNC: 3.1 G/DL (ref 3.5–5)
ALP SERPL-CCNC: 112 U/L (ref 34–104)
ALT SERPL W P-5'-P-CCNC: 31 U/L (ref 7–52)
AMMONIA PLAS-SCNC: 35 UMOL/L (ref 18–72)
AMPHETAMINES SERPL QL SCN: NEGATIVE
ANION GAP SERPL CALCULATED.3IONS-SCNC: 12 MMOL/L (ref 4–13)
ANION GAP SERPL CALCULATED.3IONS-SCNC: 13 MMOL/L (ref 4–13)
APAP SERPL-MCNC: <2 UG/ML (ref 10–20)
AST SERPL W P-5'-P-CCNC: 32 U/L (ref 13–39)
ATRIAL RATE: 105 BPM
B-OH-BUTYR SERPL-MCNC: 0.08 MMOL/L (ref 0.02–0.27)
BACTERIA UR QL AUTO: NORMAL /HPF
BARBITURATES UR QL: NEGATIVE
BASE EX.OXY STD BLDV CALC-SCNC: 94.7 % (ref 60–80)
BASE EXCESS BLDV CALC-SCNC: -5 MMOL/L
BASOPHILS # BLD AUTO: 0.07 THOUSANDS/ΜL (ref 0–0.1)
BASOPHILS NFR BLD AUTO: 1 % (ref 0–1)
BENZODIAZ UR QL: NEGATIVE
BILIRUB SERPL-MCNC: 0.61 MG/DL (ref 0.2–1)
BILIRUB UR QL STRIP: NEGATIVE
BUN SERPL-MCNC: 7 MG/DL (ref 5–25)
BUN SERPL-MCNC: 8 MG/DL (ref 5–25)
CALCIUM ALBUM COR SERPL-MCNC: 9.2 MG/DL (ref 8.3–10.1)
CALCIUM SERPL-MCNC: 7.2 MG/DL (ref 8.4–10.2)
CALCIUM SERPL-MCNC: 8.5 MG/DL (ref 8.4–10.2)
CARDIAC TROPONIN I PNL SERPL HS: 12 NG/L
CHLORIDE SERPL-SCNC: 100 MMOL/L (ref 96–108)
CHLORIDE SERPL-SCNC: 93 MMOL/L (ref 96–108)
CK SERPL-CCNC: 26 U/L (ref 39–308)
CLARITY UR: CLEAR
CO2 SERPL-SCNC: 27 MMOL/L (ref 21–32)
CO2 SERPL-SCNC: 28 MMOL/L (ref 21–32)
COCAINE UR QL: NEGATIVE
COLOR UR: COLORLESS
CREAT SERPL-MCNC: 1.14 MG/DL (ref 0.6–1.3)
CREAT SERPL-MCNC: 1.57 MG/DL (ref 0.6–1.3)
EOSINOPHIL # BLD AUTO: 0.32 THOUSAND/ΜL (ref 0–0.61)
EOSINOPHIL NFR BLD AUTO: 2 % (ref 0–6)
ERYTHROCYTE [DISTWIDTH] IN BLOOD BY AUTOMATED COUNT: 11.5 % (ref 11.6–15.1)
ETHANOL SERPL-MCNC: <10 MG/DL
FENTANYL UR QL SCN: NEGATIVE
GFR SERPL CREATININE-BSD FRML MDRD: 49 ML/MIN/1.73SQ M
GFR SERPL CREATININE-BSD FRML MDRD: 72 ML/MIN/1.73SQ M
GLUCOSE SERPL-MCNC: 107 MG/DL (ref 65–140)
GLUCOSE SERPL-MCNC: 147 MG/DL (ref 65–140)
GLUCOSE SERPL-MCNC: 221 MG/DL (ref 65–140)
GLUCOSE SERPL-MCNC: 241 MG/DL (ref 65–140)
GLUCOSE SERPL-MCNC: 270 MG/DL (ref 65–140)
GLUCOSE SERPL-MCNC: 440 MG/DL (ref 65–140)
GLUCOSE SERPL-MCNC: 79 MG/DL (ref 65–140)
GLUCOSE SERPL-MCNC: 794 MG/DL (ref 65–140)
GLUCOSE SERPL-MCNC: 94 MG/DL (ref 65–140)
GLUCOSE SERPL-MCNC: >600 MG/DL (ref 65–140)
GLUCOSE UR STRIP-MCNC: ABNORMAL MG/DL
HCO3 BLDV-SCNC: 22.5 MMOL/L (ref 24–30)
HCT VFR BLD AUTO: 32.7 % (ref 36.5–49.3)
HGB BLD-MCNC: 11.9 G/DL (ref 12–17)
HGB UR QL STRIP.AUTO: ABNORMAL
HYDROCODONE UR QL SCN: NEGATIVE
IMM GRANULOCYTES # BLD AUTO: 0.13 THOUSAND/UL (ref 0–0.2)
IMM GRANULOCYTES NFR BLD AUTO: 1 % (ref 0–2)
KETONES UR STRIP-MCNC: NEGATIVE MG/DL
LACTATE SERPL-SCNC: 3.1 MMOL/L (ref 0.5–2)
LACTATE SERPL-SCNC: 9.9 MMOL/L (ref 0.5–2)
LEUKOCYTE ESTERASE UR QL STRIP: ABNORMAL
LYMPHOCYTES # BLD AUTO: 3.92 THOUSANDS/ΜL (ref 0.6–4.47)
LYMPHOCYTES NFR BLD AUTO: 26 % (ref 14–44)
MAGNESIUM SERPL-MCNC: 2 MG/DL (ref 1.9–2.7)
MCH RBC QN AUTO: 31.6 PG (ref 26.8–34.3)
MCHC RBC AUTO-ENTMCNC: 36.4 G/DL (ref 31.4–37.4)
MCV RBC AUTO: 87 FL (ref 82–98)
METHADONE UR QL: NEGATIVE
MONOCYTES # BLD AUTO: 1.1 THOUSAND/ΜL (ref 0.17–1.22)
MONOCYTES NFR BLD AUTO: 7 % (ref 4–12)
NEUTROPHILS # BLD AUTO: 9.29 THOUSANDS/ΜL (ref 1.85–7.62)
NEUTS SEG NFR BLD AUTO: 63 % (ref 43–75)
NITRITE UR QL STRIP: NEGATIVE
NON-SQ EPI CELLS URNS QL MICRO: NORMAL /HPF
NRBC BLD AUTO-RTO: 0 /100 WBCS
O2 CT BLDV-SCNC: 16.1 ML/DL
OPIATES UR QL SCN: NEGATIVE
OSMOLALITY UR/SERPL-RTO: 295 MMOL/KG (ref 282–298)
OSMOLALITY UR/SERPL-RTO: 315 MMOL/KG (ref 282–298)
OXYCODONE+OXYMORPHONE UR QL SCN: NEGATIVE
P AXIS: 64 DEGREES
PCO2 BLDV: 52.7 MM HG (ref 42–50)
PCP UR QL: NEGATIVE
PH BLDV: 7.25 [PH] (ref 7.3–7.4)
PH UR STRIP.AUTO: 5.5 [PH]
PHOSPHATE SERPL-MCNC: 1.4 MG/DL (ref 2.7–4.5)
PLATELET # BLD AUTO: 238 THOUSANDS/UL (ref 149–390)
PMV BLD AUTO: 11.2 FL (ref 8.9–12.7)
PO2 BLDV: 106.8 MM HG (ref 35–45)
POTASSIUM SERPL-SCNC: 3.2 MMOL/L (ref 3.5–5.3)
POTASSIUM SERPL-SCNC: 3.5 MMOL/L (ref 3.5–5.3)
PR INTERVAL: 144 MS
PROT SERPL-MCNC: 5.8 G/DL (ref 6.4–8.4)
PROT UR STRIP-MCNC: ABNORMAL MG/DL
QRS AXIS: 6 DEGREES
QRSD INTERVAL: 96 MS
QT INTERVAL: 366 MS
QTC INTERVAL: 483 MS
RBC # BLD AUTO: 3.77 MILLION/UL (ref 3.88–5.62)
RBC #/AREA URNS AUTO: NORMAL /HPF
SALICYLATES SERPL-MCNC: <5 MG/DL (ref 3–20)
SODIUM SERPL-SCNC: 133 MMOL/L (ref 135–147)
SODIUM SERPL-SCNC: 140 MMOL/L (ref 135–147)
SP GR UR STRIP.AUTO: 1.02 (ref 1–1.03)
T WAVE AXIS: 3 DEGREES
THC UR QL: NEGATIVE
UROBILINOGEN UR STRIP-ACNC: <2 MG/DL
VENTRICULAR RATE: 105 BPM
WBC # BLD AUTO: 14.83 THOUSAND/UL (ref 4.31–10.16)
WBC #/AREA URNS AUTO: NORMAL /HPF

## 2024-10-22 PROCEDURE — 84100 ASSAY OF PHOSPHORUS: CPT

## 2024-10-22 PROCEDURE — 83735 ASSAY OF MAGNESIUM: CPT

## 2024-10-22 PROCEDURE — 83605 ASSAY OF LACTIC ACID: CPT | Performed by: EMERGENCY MEDICINE

## 2024-10-22 PROCEDURE — 80143 DRUG ASSAY ACETAMINOPHEN: CPT | Performed by: EMERGENCY MEDICINE

## 2024-10-22 PROCEDURE — 82948 REAGENT STRIP/BLOOD GLUCOSE: CPT

## 2024-10-22 PROCEDURE — 93005 ELECTROCARDIOGRAM TRACING: CPT

## 2024-10-22 PROCEDURE — NC001 PR NO CHARGE: Performed by: INTERNAL MEDICINE

## 2024-10-22 PROCEDURE — 80053 COMPREHEN METABOLIC PANEL: CPT | Performed by: EMERGENCY MEDICINE

## 2024-10-22 PROCEDURE — 82077 ASSAY SPEC XCP UR&BREATH IA: CPT | Performed by: EMERGENCY MEDICINE

## 2024-10-22 PROCEDURE — 96374 THER/PROPH/DIAG INJ IV PUSH: CPT

## 2024-10-22 PROCEDURE — 82010 KETONE BODYS QUAN: CPT | Performed by: EMERGENCY MEDICINE

## 2024-10-22 PROCEDURE — 99291 CRITICAL CARE FIRST HOUR: CPT | Performed by: EMERGENCY MEDICINE

## 2024-10-22 PROCEDURE — 82550 ASSAY OF CK (CPK): CPT | Performed by: EMERGENCY MEDICINE

## 2024-10-22 PROCEDURE — 84484 ASSAY OF TROPONIN QUANT: CPT | Performed by: EMERGENCY MEDICINE

## 2024-10-22 PROCEDURE — 93010 ELECTROCARDIOGRAM REPORT: CPT

## 2024-10-22 PROCEDURE — 82140 ASSAY OF AMMONIA: CPT | Performed by: EMERGENCY MEDICINE

## 2024-10-22 PROCEDURE — 85025 COMPLETE CBC W/AUTO DIFF WBC: CPT | Performed by: EMERGENCY MEDICINE

## 2024-10-22 PROCEDURE — 82805 BLOOD GASES W/O2 SATURATION: CPT | Performed by: EMERGENCY MEDICINE

## 2024-10-22 PROCEDURE — 99285 EMERGENCY DEPT VISIT HI MDM: CPT

## 2024-10-22 PROCEDURE — 83930 ASSAY OF BLOOD OSMOLALITY: CPT

## 2024-10-22 PROCEDURE — 80307 DRUG TEST PRSMV CHEM ANLYZR: CPT | Performed by: EMERGENCY MEDICINE

## 2024-10-22 PROCEDURE — 36415 COLL VENOUS BLD VENIPUNCTURE: CPT | Performed by: EMERGENCY MEDICINE

## 2024-10-22 PROCEDURE — 80179 DRUG ASSAY SALICYLATE: CPT | Performed by: EMERGENCY MEDICINE

## 2024-10-22 PROCEDURE — 81001 URINALYSIS AUTO W/SCOPE: CPT | Performed by: EMERGENCY MEDICINE

## 2024-10-22 PROCEDURE — 80048 BASIC METABOLIC PNL TOTAL CA: CPT

## 2024-10-22 RX ORDER — SODIUM CHLORIDE, SODIUM GLUCONATE, SODIUM ACETATE, POTASSIUM CHLORIDE, MAGNESIUM CHLORIDE, SODIUM PHOSPHATE, DIBASIC, AND POTASSIUM PHOSPHATE .53; .5; .37; .037; .03; .012; .00082 G/100ML; G/100ML; G/100ML; G/100ML; G/100ML; G/100ML; G/100ML
1000 INJECTION, SOLUTION INTRAVENOUS ONCE
Status: COMPLETED | OUTPATIENT
Start: 2024-10-22 | End: 2024-10-22

## 2024-10-22 RX ORDER — DEXTROSE, SODIUM CHLORIDE, SODIUM LACTATE, POTASSIUM CHLORIDE, AND CALCIUM CHLORIDE 5; .6; .31; .03; .02 G/100ML; G/100ML; G/100ML; G/100ML; G/100ML
125 INJECTION, SOLUTION INTRAVENOUS CONTINUOUS
Status: DISCONTINUED | OUTPATIENT
Start: 2024-10-22 | End: 2024-10-23

## 2024-10-22 RX ORDER — LORAZEPAM 2 MG/ML
2 INJECTION INTRAMUSCULAR ONCE
Status: COMPLETED | OUTPATIENT
Start: 2024-10-22 | End: 2024-10-22

## 2024-10-22 RX ORDER — AMLODIPINE BESYLATE 5 MG/1
5 TABLET ORAL 2 TIMES DAILY
Status: DISCONTINUED | OUTPATIENT
Start: 2024-10-23 | End: 2024-10-26 | Stop reason: HOSPADM

## 2024-10-22 RX ORDER — SODIUM CHLORIDE, SODIUM GLUCONATE, SODIUM ACETATE, POTASSIUM CHLORIDE, MAGNESIUM CHLORIDE, SODIUM PHOSPHATE, DIBASIC, AND POTASSIUM PHOSPHATE .53; .5; .37; .037; .03; .012; .00082 G/100ML; G/100ML; G/100ML; G/100ML; G/100ML; G/100ML; G/100ML
500 INJECTION, SOLUTION INTRAVENOUS CONTINUOUS
Status: DISCONTINUED | OUTPATIENT
Start: 2024-10-22 | End: 2024-10-22

## 2024-10-22 RX ORDER — HYDRALAZINE HYDROCHLORIDE 20 MG/ML
5 INJECTION INTRAMUSCULAR; INTRAVENOUS EVERY 6 HOURS PRN
Status: DISCONTINUED | OUTPATIENT
Start: 2024-10-22 | End: 2024-10-22

## 2024-10-22 RX ORDER — CALCIUM GLUCONATE 20 MG/ML
2 INJECTION, SOLUTION INTRAVENOUS ONCE
Status: COMPLETED | OUTPATIENT
Start: 2024-10-22 | End: 2024-10-22

## 2024-10-22 RX ORDER — LABETALOL HYDROCHLORIDE 5 MG/ML
10 INJECTION, SOLUTION INTRAVENOUS EVERY 4 HOURS PRN
Status: DISCONTINUED | OUTPATIENT
Start: 2024-10-22 | End: 2024-10-26 | Stop reason: HOSPADM

## 2024-10-22 RX ORDER — LORAZEPAM 2 MG/ML
2 INJECTION INTRAMUSCULAR ONCE
Status: DISCONTINUED | OUTPATIENT
Start: 2024-10-22 | End: 2024-10-22

## 2024-10-22 RX ORDER — HYDRALAZINE HYDROCHLORIDE 20 MG/ML
10 INJECTION INTRAMUSCULAR; INTRAVENOUS EVERY 4 HOURS PRN
Status: DISCONTINUED | OUTPATIENT
Start: 2024-10-22 | End: 2024-10-26 | Stop reason: HOSPADM

## 2024-10-22 RX ORDER — HYDRALAZINE HYDROCHLORIDE 20 MG/ML
5 INJECTION INTRAMUSCULAR; INTRAVENOUS ONCE
Status: COMPLETED | OUTPATIENT
Start: 2024-10-22 | End: 2024-10-22

## 2024-10-22 RX ORDER — SODIUM CHLORIDE, SODIUM GLUCONATE, SODIUM ACETATE, POTASSIUM CHLORIDE, MAGNESIUM CHLORIDE, SODIUM PHOSPHATE, DIBASIC, AND POTASSIUM PHOSPHATE .53; .5; .37; .037; .03; .012; .00082 G/100ML; G/100ML; G/100ML; G/100ML; G/100ML; G/100ML; G/100ML
250 INJECTION, SOLUTION INTRAVENOUS CONTINUOUS
Status: DISCONTINUED | OUTPATIENT
Start: 2024-10-22 | End: 2024-10-22

## 2024-10-22 RX ORDER — CHLORHEXIDINE GLUCONATE ORAL RINSE 1.2 MG/ML
15 SOLUTION DENTAL EVERY 12 HOURS SCHEDULED
Status: DISCONTINUED | OUTPATIENT
Start: 2024-10-22 | End: 2024-10-23

## 2024-10-22 RX ORDER — LORAZEPAM 2 MG/ML
1 INJECTION INTRAMUSCULAR ONCE
Status: COMPLETED | OUTPATIENT
Start: 2024-10-22 | End: 2024-10-22

## 2024-10-22 RX ADMIN — LORAZEPAM 2 MG: 2 INJECTION INTRAMUSCULAR; INTRAVENOUS at 14:55

## 2024-10-22 RX ADMIN — LORAZEPAM 1 MG: 2 INJECTION INTRAMUSCULAR; INTRAVENOUS at 16:41

## 2024-10-22 RX ADMIN — SODIUM CHLORIDE, SODIUM GLUCONATE, SODIUM ACETATE, POTASSIUM CHLORIDE, MAGNESIUM CHLORIDE, SODIUM PHOSPHATE, DIBASIC, AND POTASSIUM PHOSPHATE 500 ML/HR: .53; .5; .37; .037; .03; .012; .00082 INJECTION, SOLUTION INTRAVENOUS at 18:07

## 2024-10-22 RX ADMIN — DEXTROSE, SODIUM CHLORIDE, SODIUM LACTATE, POTASSIUM CHLORIDE, AND CALCIUM CHLORIDE 75 ML/HR: 5; .6; .31; .03; .02 INJECTION, SOLUTION INTRAVENOUS at 20:54

## 2024-10-22 RX ADMIN — PHENOBARBITAL SODIUM 621 MG: 130 INJECTION INTRAMUSCULAR; INTRAVENOUS at 20:14

## 2024-10-22 RX ADMIN — SODIUM CHLORIDE 1000 ML: 0.9 INJECTION, SOLUTION INTRAVENOUS at 15:05

## 2024-10-22 RX ADMIN — POTASSIUM PHOSPHATE 30 MMOL: 236; 224 INJECTION, SOLUTION INTRAVENOUS at 22:08

## 2024-10-22 RX ADMIN — SODIUM CHLORIDE 12 UNITS/HR: 9 INJECTION, SOLUTION INTRAVENOUS at 16:45

## 2024-10-22 RX ADMIN — CALCIUM GLUCONATE 2 G: 20 INJECTION, SOLUTION INTRAVENOUS at 21:01

## 2024-10-22 RX ADMIN — HYDRALAZINE HYDROCHLORIDE 5 MG: 20 INJECTION, SOLUTION INTRAMUSCULAR; INTRAVENOUS at 18:06

## 2024-10-22 RX ADMIN — FOLIC ACID 1 MG: 5 INJECTION, SOLUTION INTRAMUSCULAR; INTRAVENOUS; SUBCUTANEOUS at 22:47

## 2024-10-22 RX ADMIN — SODIUM CHLORIDE, SODIUM GLUCONATE, SODIUM ACETATE, POTASSIUM CHLORIDE, MAGNESIUM CHLORIDE, SODIUM PHOSPHATE, DIBASIC, AND POTASSIUM PHOSPHATE 1000 ML: .53; .5; .37; .037; .03; .012; .00082 INJECTION, SOLUTION INTRAVENOUS at 17:04

## 2024-10-22 RX ADMIN — SODIUM CHLORIDE, SODIUM GLUCONATE, SODIUM ACETATE, POTASSIUM CHLORIDE, MAGNESIUM CHLORIDE, SODIUM PHOSPHATE, DIBASIC, AND POTASSIUM PHOSPHATE 500 ML/HR: .53; .5; .37; .037; .03; .012; .00082 INJECTION, SOLUTION INTRAVENOUS at 19:18

## 2024-10-22 RX ADMIN — CHLORHEXIDINE GLUCONATE 15 ML: 1.2 RINSE ORAL at 20:55

## 2024-10-22 RX ADMIN — THIAMINE HYDROCHLORIDE 500 MG: 100 INJECTION, SOLUTION INTRAMUSCULAR; INTRAVENOUS at 22:03

## 2024-10-22 NOTE — ASSESSMENT & PLAN NOTE
On admission, glucose was 794  VBG showed acidosis, likely mixed respiratory and metabolic  Lactic acid was elevated at 9.9, repeat is 3.1  Anion gap was normal, BHB was normal  Possibly due to medication noncompliance vs binge drinking    Plan:  Continue fluids  Weight based insulin drip 0.1 units/kg/hr  When serum glucose is below 300, reduce infusion rate to 0.02-0.05 units/kg/hr  Monitor and replete electrolytes q4 hours  Monitor serum osmolality q4 hours  Level to change by <3 or risk of causing cerebral edema  NPO  Hypoglycemic protocol

## 2024-10-22 NOTE — H&P
Assessment & Plan  Hyperglycemia without ketosis  On admission, glucose was 794  VBG showed acidosis, likely mixed respiratory and metabolic  Lactic acid was elevated at 9.9, repeat is 3.1  Anion gap was normal, BHB was normal  Possibly due to medication noncompliance vs binge drinking    Plan:  Continue fluids  Weight based insulin drip 0.1 units/kg/hr  When serum glucose is below 300, reduce infusion rate to 0.02-0.05 units/kg/hr  Monitor and replete electrolytes q4 hours  Monitor serum osmolality q4 hours  Level to change by <3 or risk of causing cerebral edema  NPO  Hypoglycemic protocol  Seizure (HCC)  Had witnessed tonic clonic seizure in the hospital today  Has had these episodes in the past  Differential is tonic clonic seizure vs alcoholic withdrawal seizure vs metabolic process   UDS was negative    Plan:  Neurochecks  Ativan as needed for seizures  Seizure precautions  Alcohol withdrawal (HCC)  Patient's possible likely last drink was 3 days ago.  Could be having seizures due to alcohol withdrawal.    Plan:  CIWA protocol   Acute encephalopathy  Currently drowsy and lethargic.  In the setting of hyperglycemia vs alcoholic withdrawal    Plan:  Maintain normoglycemia  Continue to monitor at current time.     Type 2 diabetes mellitus (HCC)    Lab Results   Component Value Date    HGBA1C 15.7 (H) 01/26/2024     Has uncontrolled diabetes mellitus  On insulin at home, but unsure of compliance.   Hypertensive urgency  Blood pressure was elevated at 216/95    Plan:   Will order PRN hydralazine for SBP>180  Can order PRN Labetalol as second line.       Disposition: Stepdown Level 1    History of Present Illness   Wes Araujo is a 54 y.o.  with a PMH of T2DM, CKD, paroxysmal A-fib, chronic pancreatitis, alcohol abuse, illicit substance abuse who presents with altered mental status.  Patient appears drowsy and lethargic, does not answer any questions. History was obtained from patient's father and the ED  provider.    Patient was found at his home by his father who stated they called the ambulance as patient had not been eating for about 3 day, only drinking juice. He needed help to go to the bathroom as he was confused, sweaty, and shaky. He has had this in the past when he has had low blood sugars. EMS reads glucose as high and he was unresponsive and incontinent of urine per EMS. He had a witnessed tonic clonic seizure in triage on arrival to the ED for which he was given Ativan.     Patient's blood glucose was in 794 on admission, no anion gap, BHB normal.  Had significant lactic acidosis.  VBG pH 7.2.  Patient is a heavy alcohol user. Last alcohol intake unknown, but patient states it may have been 3 days ago. Unclear if he had any fever, headache, nausea, vomiting or any other signs of infection at home.      History obtained from father and chart review.  Review of Systems: See HPI for Review of Systems    Historical Information   Past Medical History:  No date: Alcohol abuse  No date: Chronic pancreatitis (HCC)  No date: Diabetes mellitus (HCC)      Comment:  Type 2  No date: Pancreatitis  No date: Paroxysmal A-fib (HCC)  No date: Thrombocytopenia (HCC) Past Surgical History:  8/16/2020: INCISION AND DRAINAGE OF WOUND; N/A      Comment:  Procedure: INCISION AND DRAINAGE (I&D) HEAD/FACE;                 Surgeon: Estrada Walton DMD;  Location: [facility];                 Service: Maxillofacial  2/7/2019: IR BIOPSY BONE MARROW  8/16/2020: REMOVAL OF IMPACTED TOOTH - COMPLETELY BONY; N/A      Comment:  Procedure: EXTRACTION TEETH MULTIPLE #2, 3;  Surgeon:                Estrada Walton DMD;  Location: [facility];  Service:                Maxillofacial   Current Outpatient Medications   Medication Instructions    Alcohol Swabs 70 % PADS May substitute brand based on insurance coverage. Check glucose TID.    amLODIPine (NORVASC) 5 mg, Oral, 2 times daily    Blood Glucose Monitoring Suppl (OneTouch Verio Reflect)  w/Device KIT May substitute brand based on insurance coverage. Check glucose TID.    Continuous Blood Gluc Sensor (FreeStyle Pablo 3 Sensor) MISC 1 each, Does not apply, Every 14 days    ergocalciferol (VITAMIN D2) 50,000 Units, Oral, Weekly    glucose blood (OneTouch Verio) test strip May substitute brand based on insurance coverage. Check glucose TID.    hydrALAZINE (APRESOLINE) 25 mg, Oral, Every 8 hours scheduled    insulin aspart protamine-insulin aspart (NovoLOG Mix 70/30 FlexPen) 100 Units/mL injection pen 5 Units, Subcutaneous, 2 times daily with meals    insulin isophane-insulin regular (NovoLIN 70/30 FlexPen) 100 units/mL injection pen 5 Units, Subcutaneous, 2 times daily before meals    Insulin Pen Needle (BD Pen Needle Claudia 2nd Gen) 32G X 4 MM MISC For use with insulin pen. Pharmacy may dispense brand covered by insurance.    methocarbamol (ROBAXIN) 500 mg, Oral, Every 6 hours PRN    Multiple Vitamin (multivitamin) tablet 1 tablet, Oral, Daily    OneTouch Delica Lancets 33G MISC May substitute brand based on insurance coverage. Check glucose TID.    pancrelipase (Lip-Prot-Amyl) (CREON) 48,000 Units, Oral, 3 times daily with meals    pancrelipase, Lip-Prot-Amyl, (CREON) 24,000 units 24,000 units of lipase, Oral, 3 times daily with meals    No Known Allergies   Social History     Tobacco Use    Smoking status: Former     Passive exposure: Past    Smokeless tobacco: Never   Vaping Use    Vaping status: Never Used   Substance Use Topics    Alcohol use: Yes    Drug use: Yes     Types: Cocaine    Family History   Problem Relation Age of Onset    Diabetes Mother     Hypertension Mother     Hyperlipidemia Father           Objective :                   Vitals I/O      Most Recent Min/Max in 24hrs   Temp 98.1 °F (36.7 °C) Temp  Min: 98.1 °F (36.7 °C)  Max: 98.1 °F (36.7 °C)   Pulse 83 Pulse  Min: 83  Max: 92   Resp 13 Resp  Min: 13  Max: 20   BP (!) 216/95 BP  Min: 163/79  Max: 216/95   O2 Sat 100 % SpO2  Min: 99  %  Max: 100 %      Intake/Output Summary (Last 24 hours) at 10/22/2024 1759  Last data filed at 10/22/2024 1533  Gross per 24 hour   Intake 1000 ml   Output --   Net 1000 ml       Diet NPO    Invasive Monitoring           Physical Exam   Physical Exam  Vitals reviewed.   Skin:     General: Skin is warm.      Coloration: Skin is not jaundiced.   HENT:      Head: Normocephalic.   Cardiovascular:      Rate and Rhythm: Normal rate and regular rhythm.      Pulses: Normal pulses.      Heart sounds: No murmur heard.     No friction rub. No gallop.   Musculoskeletal:      Right lower leg: No edema.      Left lower leg: No edema.   Abdominal: General: Bowel sounds are normal.      Palpations: Abdomen is soft.      Tenderness: There is no abdominal tenderness.   Constitutional:       General: He is in acute distress.      Appearance: He is ill-appearing. He is not toxic-appearing.      Interventions: He is not sedated, not intubated and not restrained.  Pulmonary:      Effort: Pulmonary effort is normal. No accessory muscle usage, respiratory distress or accessory muscle usage. He is not intubated.      Breath sounds: No wheezing, rhonchi or rales.   Neurological:      Mental Status: He is somnolent.      Comments: letahrgic          Diagnostic Studies        Lab Results: I have reviewed the following results:     Medications:  Scheduled PRN   chlorhexidine, 15 mL, Q12H JAISON  multi-electrolyte, 1,000 mL, Once          Continuous    insulin regular (HumuLIN R,NovoLIN R) 1 Units/mL in sodium chloride 0.9 % 100 mL infusion, 0.3-21 Units/hr, Last Rate: 12 Units/hr (10/22/24 1645)  multi-electrolyte, 500 mL/hr   Followed by  multi-electrolyte, 250 mL/hr         Labs:   CBC    Recent Labs     10/22/24  1507   WBC 14.83*   HGB 11.9*   HCT 32.7*        BMP    Recent Labs     10/22/24  1507   SODIUM 133*   K 3.5   CL 93*   CO2 27   AGAP 13   BUN 8   CREATININE 1.57*   CALCIUM 8.5       Coags    No recent results     Additional  Electrolytes  No recent results       Blood Gas    No recent results  Recent Labs     10/22/24  1507   PHVEN 7.249*   PXU2PCE 52.7*   PO2VEN 106.8*   BEV1FBM 22.5*   BEVEN -5.0   Q9ZXZGH 94.7*    LFTs  Recent Labs     10/22/24  1507   ALT 31   AST 32   ALKPHOS 112*   ALB 3.1*   TBILI 0.61       Infectious  No recent results  Glucose  Recent Labs     10/22/24  1507   GLUC 794*

## 2024-10-22 NOTE — ASSESSMENT & PLAN NOTE
Currently drowsy and lethargic.  In the setting of hyperglycemia vs alcoholic withdrawal    Plan:  Maintain normoglycemia  Continue to monitor at current time.

## 2024-10-22 NOTE — ASSESSMENT & PLAN NOTE
Had witnessed tonic clonic seizure in the hospital today  Has had these episodes in the past  Differential is tonic clonic seizure vs alcoholic withdrawal seizure vs metabolic process   UDS was negative    Plan:  Neurochecks  Ativan as needed for seizures  Seizure precautions

## 2024-10-22 NOTE — ASSESSMENT & PLAN NOTE
Lab Results   Component Value Date    HGBA1C 15.7 (H) 01/26/2024     Has uncontrolled diabetes mellitus  On insulin at home, but unsure of compliance.

## 2024-10-22 NOTE — ED PROVIDER NOTES
"Time reflects when diagnosis was documented in both MDM as applicable and the Disposition within this note       Time User Action Codes Description Comment    10/22/2024  4:19 PM Angel Barbosa Add [R73.9] Hyperglycemia     10/22/2024  4:19 PM Angel Barbosa Add [E87.20] Acidosis, lactic     10/22/2024  4:19 PM Angel Barbosa Add [R56.9] Seizure (HCC)     10/22/2024  4:20 PM Angel Barbosa Add [G92.8] Toxic metabolic encephalopathy     10/22/2024  4:29 PM Angel Barbosa Add [F10.939] Alcohol withdrawal (HCC)           ED Disposition       ED Disposition   Admit    Condition   Stable    Date/Time   Tue Oct 22, 2024  4:28 PM    Comment   Case was discussed with Dr. Lopez and the patient's admission status was agreed to be Admission Status: inpatient status to the service of Dr. Lopez   .               Assessment & Plan       Medical Decision Making  Amount and/or Complexity of Data Reviewed  Labs: ordered. Decision-making details documented in ED Course.    Risk  Prescription drug management.  Decision regarding hospitalization.        ED Course as of 10/22/24 1826   Tue Oct 22, 2024   1503 POC Glucose(!!): >600   1540 ETHANOL: <10   1543 He is recovering from his post ictal state, opens eyes   1548 ANION GAP: 13  normal   1554 pH, Sami(!): 7.249  acidosis   1616 D/W his father at bedside through Korean speaking nurse, JACKIE Sousa, who said they called the ambulance because he has not been eating for about 3 days, only drinking \"juice\", and today he was confused and needed help to go to the bathroom by his . They thought his blood sugar was low which is more common, he gets sweaty and shaky and confused.  He does affirm he is a heavy drinker of alcohol and that he thinks the last time he was drinking heavy was 3 days ago.  He is also aware that he has had seizure, but never at home, and he has not been put on medication.   1620 He is more awake, not responding coherently verbally, still appears " postictal. Considering alcohol withdrawal, both seizure, and or DTs.   1719 LACTIC ACID(!!): 9.9  Lactic acidosis, following seizure, will follow the trend with IVF.         Medications   insulin regular (HumuLIN R,NovoLIN R) 1 Units/mL in sodium chloride 0.9 % 100 mL infusion (12 Units/hr Intravenous New Bag 10/22/24 1645)   chlorhexidine (PERIDEX) 0.12 % oral rinse 15 mL (has no administration in time range)   multi-electrolyte (ISOLYTE-S PH 7.4 equivalent) IV solution (500 mL/hr Intravenous New Bag 10/22/24 1807)     Followed by   multi-electrolyte (ISOLYTE-S PH 7.4 equivalent) IV solution (has no administration in time range)   hydrALAZINE (APRESOLINE) injection 5 mg (has no administration in time range)   LORazepam (ATIVAN) injection 2 mg (2 mg Intravenous Given by Other 10/22/24 1455)   sodium chloride 0.9 % bolus 1,000 mL (0 mL Intravenous Stopped 10/22/24 1533)   LORazepam (ATIVAN) injection 1 mg (1 mg Intravenous Given 10/22/24 1641)   multi-electrolyte (ISOLYTE-S PH 7.4) bolus 1,000 mL (0 mL Intravenous Stopped 10/22/24 1803)   hydrALAZINE (APRESOLINE) injection 5 mg (5 mg Intravenous Given 10/22/24 1806)       ED Risk Strat Scores                                               History of Present Illness       Chief Complaint   Patient presents with    Hyperglycemia - Symptomatic     Pt arrives EMS, per EMS glucose reads high, known type 2 DM, altered and [place] speaking only, unresponsive and incontinent of urine per EMS on arrival, pt woke up with finger stick, Pt with tonic clonic seizure in triage.       Past Medical History:   Diagnosis Date    Alcohol abuse     Chronic pancreatitis (HCC)     Diabetes mellitus (HCC)     Type 2    Pancreatitis     Paroxysmal A-fib (HCC)     Thrombocytopenia (HCC)       Past Surgical History:   Procedure Laterality Date    INCISION AND DRAINAGE OF WOUND N/A 8/16/2020    Procedure: INCISION AND DRAINAGE (I&D) HEAD/FACE;  Surgeon: Estrada Walton DMD;  Location: [facility];   Service: Maxillofacial    IR BIOPSY BONE MARROW  2/7/2019    REMOVAL OF IMPACTED TOOTH - COMPLETELY BONY N/A 8/16/2020    Procedure: EXTRACTION TEETH MULTIPLE #2, 3;  Surgeon: Estrada Walton DMD;  Location: [facility];  Service: Maxillofacial      Family History   Problem Relation Age of Onset    Diabetes Mother     Hypertension Mother     Hyperlipidemia Father       Social History     Tobacco Use    Smoking status: Former     Passive exposure: Past    Smokeless tobacco: Never   Vaping Use    Vaping status: Never Used   Substance Use Topics    Alcohol use: Yes    Drug use: Yes     Types: Cocaine      E-Cigarette/Vaping    E-Cigarette Use Never User       E-Cigarette/Vaping Substances      I have reviewed and agree with the history as documented.     HPI    Review of Systems        Objective       ED Triage Vitals   Temperature Pulse Blood Pressure Respirations SpO2 Patient Position - Orthostatic VS   10/22/24 1501 10/22/24 1500 10/22/24 1500 10/22/24 1500 10/22/24 1500 10/22/24 1500   98.1 °F (36.7 °C) 88 163/79 18 99 % Sitting      Temp Source Heart Rate Source BP Location FiO2 (%) Pain Score    10/22/24 1501 10/22/24 1500 10/22/24 1500 -- --    Rectal Monitor Right arm        Vitals      Date and Time Temp Pulse SpO2 Resp BP Pain Score FACES Pain Rating User   10/22/24 1806 -- -- -- -- 213/98 -- -- DW   10/22/24 1630 -- 83 100 % 13 216/95 -- -- CO   10/22/24 1530 -- 92 100 % 20 202/97 -- -- TA   10/22/24 1501 98.1 °F (36.7 °C) -- -- -- -- -- -- DW   10/22/24 1500 -- 88 99 % 18 163/79 -- -- DW            Physical Exam  Vitals and nursing note reviewed.   Constitutional:       Appearance: He is well-developed, well-groomed and normal weight. He is ill-appearing.   HENT:      Head: Normocephalic and atraumatic.   Eyes:      Pupils: Pupils are equal, round, and reactive to light.   Cardiovascular:      Rate and Rhythm: Tachycardia present.   Pulmonary:      Effort: No tachypnea or accessory muscle usage.      Breath  "sounds: No decreased air movement.   Abdominal:      General: Abdomen is flat. There is no distension.   Skin:     General: Skin is moist.      Capillary Refill: Capillary refill takes 2 to 3 seconds.      Coloration: Skin is pale.   Neurological:      GCS: GCS eye subscore is 2. GCS verbal subscore is 2. GCS motor subscore is 4.      Motor: Seizure activity (on arrival) present.         Results Reviewed       Procedure Component Value Units Date/Time    Fingerstick Glucose (POCT) [[id number]]  (Abnormal) Collected: 10/22/24 1800    Lab Status: Final result Specimen: Blood Updated: 10/22/24 1803     POC Glucose 221 mg/dl     Basic metabolic panel [[id number]]     Lab Status: No result Specimen: Blood     Magnesium [[id number]]     Lab Status: No result Specimen: Blood     Phosphorus [[id number]]     Lab Status: No result Specimen: Blood     Osmolality-\"If this is regarding a toxic alcohol, STOP. Test is not routinely indicated. Please consult medical  on call for further guidance.\" [[id number]]     Lab Status: No result Specimen: Blood     Lactic acid 2 Hours [[id number]]  (Abnormal) Collected: 10/22/24 1712    Lab Status: Final result Specimen: Blood from Line, Venous Updated: 10/22/24 1748     LACTIC ACID 3.1 mmol/L     Narrative:      Result may be elevated if tourniquet was used during collection.    Osmolality-\"If this is regarding a toxic alcohol, STOP. Test is not routinely indicated. Please consult medical  on call for further guidance.\" [[id number]] Collected: 10/22/24 1713    Lab Status: In process Specimen: Blood from Arm, Left Updated: 10/22/24 1723    Urine Microscopic [[id number]]  (Normal) Collected: 10/22/24 1523    Lab Status: Final result Specimen: Urine, Clean Catch Updated: 10/22/24 1708     RBC, UA 1-2 /hpf      WBC, UA None Seen /hpf      Epithelial Cells None Seen /hpf      Bacteria, UA None Seen /hpf     UA w Reflex to Microscopic w Reflex to Culture [[id number]]  (Abnormal) " Collected: 10/22/24 1523    Lab Status: Final result Specimen: Urine, Clean Catch Updated: 10/22/24 1602     Color, UA Colorless     Clarity, UA Clear     Specific Gravity, UA 1.021     pH, UA 5.5     Leukocytes, UA Elevated glucose may cause decreased leukocyte values. See urine microscopic for [facility] result     Nitrite, UA Negative     Protein,  (2+) mg/dl      Glucose, UA >=1000 (1%) mg/dl      Ketones, UA Negative mg/dl      Urobilinogen, UA <2.0 mg/dl      Bilirubin, UA Negative     Occult Blood, UA Trace    Rapid drug screen, urine [[id number]]  (Normal) Collected: 10/22/24 1523    Lab Status: Final result Specimen: Urine, Clean Catch Updated: 10/22/24 1555     Amph/Meth UR Negative     Barbiturate Ur Negative     Benzodiazepine Urine Negative     Cocaine Urine Negative     Methadone Urine Negative     Opiate Urine Negative     PCP Ur Negative     THC Urine Negative     Oxycodone Urine Negative     Fentanyl Urine Negative     HYDROCODONE URINE Negative    Narrative:      FOR MEDICAL PURPOSES ONLY.   IF CONFIRMATION NEEDED PLEASE CONTACT THE LAB WITHIN 5 DAYS.    Drug Screen Cutoff Levels:  AMPHETAMINE/METHAMPHETAMINES  1000 ng/mL  BARBITURATES     200 ng/mL  BENZODIAZEPINES     200 ng/mL  COCAINE      300 ng/mL  METHADONE      300 ng/mL  OPIATES      300 ng/mL  PHENCYCLIDINE     25 ng/mL  THC       50 ng/mL  OXYCODONE      100 ng/mL  FENTANYL      5 ng/mL  HYDROCODONE     300 ng/mL    Comprehensive metabolic panel [[id number]]  (Abnormal) Collected: 10/22/24 1507    Lab Status: Final result Specimen: Blood from Arm, Left Updated: 10/22/24 1545     Sodium 133 mmol/L      Potassium 3.5 mmol/L      Chloride 93 mmol/L      CO2 27 mmol/L      ANION GAP 13 mmol/L      BUN 8 mg/dL      Creatinine 1.57 mg/dL      Glucose 794 mg/dL      Calcium 8.5 mg/dL      Corrected Calcium 9.2 mg/dL      AST 32 U/L      ALT 31 U/L      Alkaline Phosphatase 112 U/L      Total Protein 5.8 g/dL      Albumin 3.1 g/dL      Total  Bilirubin 0.61 mg/dL      eGFR 49 ml/min/1.73sq m     Narrative:      National Kid[employer] Foundation guidelines for Chronic Kidney Disease (CKD):     Stage 1 with normal or high GFR (GFR > 90 mL/min/1.73 square meters)    Stage 2 Mild CKD (GFR = 60-89 mL/min/1.73 square meters)    Stage 3A Moderate CKD (GFR = 45-59 mL/min/1.73 square meters)    Stage 3B Moderate CKD (GFR = 30-44 mL/min/1.73 square meters)    Stage 4 Severe CKD (GFR = 15-29 mL/min/1.73 square meters)    Stage 5 End Stage CKD (GFR <15 mL/min/1.73 square meters)  Note: GFR calculation is accurate only with a steady state creatinine    Lactic acid, plasma (w/reflex if result > 2.0) [[id number]]  (Abnormal) Collected: 10/22/24 1507    Lab Status: Final result Specimen: Blood from Arm, Left Updated: 10/22/24 1545     LACTIC ACID 9.9 mmol/L     Narrative:      Result may be elevated if tourniquet was used during collection.    Beta Hydroxybutyrate [[id number]]  (Normal) Collected: 10/22/24 1507    Lab Status: Final result Specimen: Blood from Arm, Left Updated: 10/22/24 1544     Beta- Hydroxybutyrate 0.08 mmol/L     CK [[id number]]  (Abnormal) Collected: 10/22/24 1507    Lab Status: Final result Specimen: Blood from Arm, Left Updated: 10/22/24 1544     Total CK 26 U/L     Salicylate level [[id number]]  (Normal) Collected: 10/22/24 1507    Lab Status: Final result Specimen: Blood from Arm, Left Updated: 10/22/24 1544     Salicylate Lvl <5 mg/dL     Acetaminophen level-If concentration is detectable, please discuss with medical  on call. [[id number]]  (Abnormal) Collected: 10/22/24 1507    Lab Status: Final result Specimen: Blood from Arm, Left Updated: 10/22/24 1544     Acetaminophen Level <2 ug/mL     Blood gas, venous [[id number]]  (Abnormal) Collected: 10/22/24 1507    Lab Status: Final result Specimen: Blood from Arm, Left Updated: 10/22/24 1544     pH, Sami 7.249     pCO2, Sami 52.7 mm Hg      pO2, Sami 106.8 mm Hg      HCO3, Sami 22.5 mmol/L       Base Excess, Sami -5.0 mmol/L      O2 Content, Sami 16.1 ml/dL      O2 HGB, VENOUS 94.7 %     HS Troponin 0hr (reflex protocol) [[id number]]  (Normal) Collected: 10/22/24 1507    Lab Status: Final result Specimen: Blood from Arm, Left Updated: 10/22/24 1542     hs TnI 0hr 12 ng/L     Ammonia [[id number]]  (Normal) Collected: 10/22/24 1507    Lab Status: Final result Specimen: Blood from Arm, Left Updated: 10/22/24 1539     Ammonia 35 umol/L     Ethanol [[id number]]  (Normal) Collected: 10/22/24 1507    Lab Status: Final result Specimen: Blood from Arm, Left Updated: 10/22/24 1539     Ethanol Lvl <10 mg/dL     CBC and differential [[id number]]  (Abnormal) Collected: 10/22/24 1507    Lab Status: Final result Specimen: Blood from Arm, Left Updated: 10/22/24 1521     WBC 14.83 Thousand/uL      RBC 3.77 Million/uL      Hemoglobin 11.9 g/dL      Hematocrit 32.7 %      MCV 87 fL      MCH 31.6 pg      MCHC 36.4 g/dL      RDW 11.5 %      MPV 11.2 fL      Platelets 238 Thousands/uL      nRBC 0 /100 WBCs      Segmented % 63 %      Immature Grans % 1 %      Lymphocytes % 26 %      Monocytes % 7 %      Eosinophils Relative 2 %      Basophils Relative 1 %      Absolute Neutrophils 9.29 Thousands/µL      Absolute Immature Grans 0.13 Thousand/uL      Absolute Lymphocytes 3.92 Thousands/µL      Absolute Monocytes 1.10 Thousand/µL      Eosinophils Absolute 0.32 Thousand/µL      Basophils Absolute 0.07 Thousands/µL     Fingerstick Glucose (POCT) [[id number]]  (Abnormal) Collected: 10/22/24 1456    Lab Status: Final result Specimen: Blood Updated: 10/22/24 1457     POC Glucose >600 mg/dl             No orders to display       CriticalCare Time    Date/Time: 10/22/2024 4:28 PM    Performed by: Angel Barbosa MD  Authorized by: Angel Barbosa MD    Critical care provider statement:     Critical care time (minutes):  45    Critical care time was exclusive of:  Separately billable procedures and treating other patients and teaching  time    Critical care was necessary to treat or prevent imminent or life-threatening deterioration of the following conditions:  Metabolic crisis and CNS failure or compromise    Critical care was time spent personally by me on the following activities:  Blood draw for specimens, obtaining history from patient or surrogate, development of treatment plan with patient or surrogate, discussions with consultants, evaluation of patient's response to treatment, examination of patient, interpretation of cardiac output measurements, ordering and performing treatments and interventions, ordering and review of laboratory studies, ordering and review of radiographic studies, re-evaluation of patient's condition and review of old charts    I assumed direction of critical care for this patient from another provider in my specialty: no        ED Medication and Procedure Management   Prior to Admission Medications   Prescriptions Last Dose Informant Patient Reported? Taking?   Alcohol Swabs 70 % PADS   No No   Sig: May substitute brand based on insurance coverage. Check glucose TID.   Blood Glucose Monitoring Suppl (OneTouch Verio Reflect) w/Device KIT   No No   Sig: May substitute brand based on insurance coverage. Check glucose TID.   Continuous Blood Gluc Sensor (FreeStyle Pablo 3 Sensor) MISC   No No   Sig: Use 1 each every 14 (fourteen) days   Insulin Pen Needle (BD Pen Needle Claudia 2nd Gen) 32G X 4 MM MISC   No No   Sig: For use with insulin pen. Pharmacy may dispense brand covered by insurance.   Multiple Vitamin (multivitamin) tablet   No No   Sig: Take 1 tablet by mouth daily   OneTouch Delica Lancets 33G MISC   No No   Sig: May substitute brand based on insurance coverage. Check glucose TID.   amLODIPine (NORVASC) 5 mg tablet   No No   Sig: Take 1 tablet (5 mg total) by mouth 2 (two) times a day   ergocalciferol (VITAMIN D2) 50,000 units   No No   Sig: Take 1 capsule (50,000 Units total) by mouth once a week for 11 doses    glucose blood (OneTouch Verio) test strip   No No   Sig: May substitute brand based on insurance coverage. Check glucose TID.   hydrALAZINE (APRESOLINE) 25 mg tablet   No No   Sig: Take 1 tablet (25 mg total) by mouth every 8 (eight) hours   insulin aspart protamine-insulin aspart (NovoLOG Mix 70/30 FlexPen) 100 Units/mL injection pen   No No   Sig: Inject 5 Units under the skin 2 (two) times a day with meals   insulin isophane-insulin regular (NovoLIN 70/30 FlexPen) 100 units/mL injection pen   No No   Sig: Inject 5 Units under the skin 2 (two) times a day before meals   methocarbamol (ROBAXIN) 500 mg tablet   No No   Sig: Take 1 tablet (500 mg total) by mouth every 6 (six) hours as needed for muscle spasms   pancrelipase, Lip-Prot-Amyl, (CREON) 24,000 units   No No   Sig: Take 2 capsules (48,000 Units total) by mouth 3 (three) times a day with meals   pancrelipase, Lip-Prot-Amyl, (CREON) 24,000 units   No No   Sig: Take 24,000 units of lipase by mouth 3 (three) times a day with meals      Facility-Administered Medications: None     Patient's Medications   Discharge Prescriptions    No medications on file     No discharge procedures on file.  ED SEPSIS DOCUMENTATION   Time reflects when diagnosis was documented in both MDM as applicable and the Disposition within this note       Time User Action Codes Description Comment    10/22/2024  4:19 PM Angel Barbosa [R73.9] Hyperglycemia     10/22/2024  4:19 PM Angel Barbosa [E87.20] Acidosis, lactic     10/22/2024  4:19 PM Angel Barbosa Add [R56.9] Seizure (HCC)     10/22/2024  4:20 PM Angel Barbosa [G92.8] Toxic metabolic encephalopathy     10/22/2024  4:29 PM Angel Barbosa [F10.939] Alcohol withdrawal (HCC)                  Angel Barbosa MD  10/22/24 3577

## 2024-10-22 NOTE — ASSESSMENT & PLAN NOTE
Blood pressure was elevated at 216/95    Plan:   Will order PRN hydralazine for SBP>180  Can order PRN Labetalol as second line.

## 2024-10-22 NOTE — ASSESSMENT & PLAN NOTE
Patient's possible likely last drink was 3 days ago.  Could be having seizures due to alcohol withdrawal.    Plan:  ANNWA protocol

## 2024-10-23 LAB
ANION GAP SERPL CALCULATED.3IONS-SCNC: 4 MMOL/L (ref 4–13)
BASE EX.OXY STD BLDV CALC-SCNC: 84.8 % (ref 60–80)
BASE EXCESS BLDV CALC-SCNC: 3.3 MMOL/L
BUN SERPL-MCNC: 6 MG/DL (ref 5–25)
CA-I BLD-SCNC: 1.1 MMOL/L (ref 1.12–1.32)
CALCIUM SERPL-MCNC: 8 MG/DL (ref 8.4–10.2)
CHLORIDE SERPL-SCNC: 103 MMOL/L (ref 96–108)
CO2 SERPL-SCNC: 32 MMOL/L (ref 21–32)
CREAT SERPL-MCNC: 1.06 MG/DL (ref 0.6–1.3)
ERYTHROCYTE [DISTWIDTH] IN BLOOD BY AUTOMATED COUNT: 10.9 % (ref 11.6–15.1)
EST. AVERAGE GLUCOSE BLD GHB EST-MCNC: 275 MG/DL
GFR SERPL CREATININE-BSD FRML MDRD: 79 ML/MIN/1.73SQ M
GLUCOSE SERPL-MCNC: 102 MG/DL (ref 65–140)
GLUCOSE SERPL-MCNC: 139 MG/DL (ref 65–140)
GLUCOSE SERPL-MCNC: 164 MG/DL (ref 65–140)
GLUCOSE SERPL-MCNC: 166 MG/DL (ref 65–140)
GLUCOSE SERPL-MCNC: 202 MG/DL (ref 65–140)
GLUCOSE SERPL-MCNC: 219 MG/DL (ref 65–140)
GLUCOSE SERPL-MCNC: 225 MG/DL (ref 65–140)
GLUCOSE SERPL-MCNC: 227 MG/DL (ref 65–140)
GLUCOSE SERPL-MCNC: 231 MG/DL (ref 65–140)
GLUCOSE SERPL-MCNC: 57 MG/DL (ref 65–140)
GLUCOSE SERPL-MCNC: 65 MG/DL (ref 65–140)
GLUCOSE SERPL-MCNC: 68 MG/DL (ref 65–140)
GLUCOSE SERPL-MCNC: 88 MG/DL (ref 65–140)
HBA1C MFR BLD: 11.2 %
HCO3 BLDV-SCNC: 28.7 MMOL/L (ref 24–30)
HCT VFR BLD AUTO: 30.4 % (ref 36.5–49.3)
HGB BLD-MCNC: 11.1 G/DL (ref 12–17)
MAGNESIUM SERPL-MCNC: 1.5 MG/DL (ref 1.9–2.7)
MCH RBC QN AUTO: 31.4 PG (ref 26.8–34.3)
MCHC RBC AUTO-ENTMCNC: 36.5 G/DL (ref 31.4–37.4)
MCV RBC AUTO: 86 FL (ref 82–98)
O2 CT BLDV-SCNC: 13.5 ML/DL
PCO2 BLDV: 46.9 MM HG (ref 42–50)
PH BLDV: 7.4 [PH] (ref 7.3–7.4)
PHOSPHATE SERPL-MCNC: 4.4 MG/DL (ref 2.7–4.5)
PLATELET # BLD AUTO: 185 THOUSANDS/UL (ref 149–390)
PMV BLD AUTO: 11.1 FL (ref 8.9–12.7)
PO2 BLDV: 48.4 MM HG (ref 35–45)
POTASSIUM SERPL-SCNC: 3.6 MMOL/L (ref 3.5–5.3)
RBC # BLD AUTO: 3.54 MILLION/UL (ref 3.88–5.62)
SODIUM SERPL-SCNC: 139 MMOL/L (ref 135–147)
WBC # BLD AUTO: 10.91 THOUSAND/UL (ref 4.31–10.16)

## 2024-10-23 PROCEDURE — 83036 HEMOGLOBIN GLYCOSYLATED A1C: CPT

## 2024-10-23 PROCEDURE — 82948 REAGENT STRIP/BLOOD GLUCOSE: CPT

## 2024-10-23 PROCEDURE — 82805 BLOOD GASES W/O2 SATURATION: CPT

## 2024-10-23 PROCEDURE — 83735 ASSAY OF MAGNESIUM: CPT

## 2024-10-23 PROCEDURE — 84100 ASSAY OF PHOSPHORUS: CPT

## 2024-10-23 PROCEDURE — 85027 COMPLETE CBC AUTOMATED: CPT

## 2024-10-23 PROCEDURE — 80048 BASIC METABOLIC PNL TOTAL CA: CPT

## 2024-10-23 PROCEDURE — 82330 ASSAY OF CALCIUM: CPT

## 2024-10-23 PROCEDURE — 99232 SBSQ HOSP IP/OBS MODERATE 35: CPT | Performed by: INTERNAL MEDICINE

## 2024-10-23 RX ORDER — SODIUM CHLORIDE, SODIUM GLUCONATE, SODIUM ACETATE, POTASSIUM CHLORIDE, MAGNESIUM CHLORIDE, SODIUM PHOSPHATE, DIBASIC, AND POTASSIUM PHOSPHATE .53; .5; .37; .037; .03; .012; .00082 G/100ML; G/100ML; G/100ML; G/100ML; G/100ML; G/100ML; G/100ML
50 INJECTION, SOLUTION INTRAVENOUS CONTINUOUS
Status: DISCONTINUED | OUTPATIENT
Start: 2024-10-23 | End: 2024-10-23

## 2024-10-23 RX ORDER — SODIUM CHLORIDE, SODIUM GLUCONATE, SODIUM ACETATE, POTASSIUM CHLORIDE, MAGNESIUM CHLORIDE, SODIUM PHOSPHATE, DIBASIC, AND POTASSIUM PHOSPHATE .53; .5; .37; .037; .03; .012; .00082 G/100ML; G/100ML; G/100ML; G/100ML; G/100ML; G/100ML; G/100ML
2000 INJECTION, SOLUTION INTRAVENOUS ONCE
Status: COMPLETED | OUTPATIENT
Start: 2024-10-23 | End: 2024-10-23

## 2024-10-23 RX ORDER — INSULIN LISPRO 100 [IU]/ML
1-5 INJECTION, SOLUTION INTRAVENOUS; SUBCUTANEOUS
Status: DISCONTINUED | OUTPATIENT
Start: 2024-10-23 | End: 2024-10-24

## 2024-10-23 RX ORDER — INSULIN ASPART 100 [IU]/ML
5 INJECTION, SUSPENSION SUBCUTANEOUS
Status: DISCONTINUED | OUTPATIENT
Start: 2024-10-23 | End: 2024-10-26 | Stop reason: HOSPADM

## 2024-10-23 RX ORDER — INSULIN LISPRO 100 [IU]/ML
1-6 INJECTION, SOLUTION INTRAVENOUS; SUBCUTANEOUS EVERY 6 HOURS SCHEDULED
Status: DISCONTINUED | OUTPATIENT
Start: 2024-10-23 | End: 2024-10-23

## 2024-10-23 RX ORDER — CALCIUM GLUCONATE 20 MG/ML
2 INJECTION, SOLUTION INTRAVENOUS ONCE
Status: COMPLETED | OUTPATIENT
Start: 2024-10-23 | End: 2024-10-23

## 2024-10-23 RX ORDER — POTASSIUM CHLORIDE 14.9 MG/ML
20 INJECTION INTRAVENOUS
Status: COMPLETED | OUTPATIENT
Start: 2024-10-23 | End: 2024-10-23

## 2024-10-23 RX ORDER — SODIUM CHLORIDE, SODIUM GLUCONATE, SODIUM ACETATE, POTASSIUM CHLORIDE, MAGNESIUM CHLORIDE, SODIUM PHOSPHATE, DIBASIC, AND POTASSIUM PHOSPHATE .53; .5; .37; .037; .03; .012; .00082 G/100ML; G/100ML; G/100ML; G/100ML; G/100ML; G/100ML; G/100ML
2000 INJECTION, SOLUTION INTRAVENOUS ONCE
Status: DISCONTINUED | OUTPATIENT
Start: 2024-10-23 | End: 2024-10-23

## 2024-10-23 RX ORDER — MAGNESIUM SULFATE HEPTAHYDRATE 40 MG/ML
4 INJECTION, SOLUTION INTRAVENOUS ONCE
Status: COMPLETED | OUTPATIENT
Start: 2024-10-23 | End: 2024-10-23

## 2024-10-23 RX ORDER — ENOXAPARIN SODIUM 100 MG/ML
40 INJECTION SUBCUTANEOUS
Status: DISCONTINUED | OUTPATIENT
Start: 2024-10-23 | End: 2024-10-26 | Stop reason: HOSPADM

## 2024-10-23 RX ADMIN — POTASSIUM CHLORIDE 20 MEQ: 14.9 INJECTION, SOLUTION INTRAVENOUS at 06:01

## 2024-10-23 RX ADMIN — ENOXAPARIN SODIUM 40 MG: 40 INJECTION SUBCUTANEOUS at 11:11

## 2024-10-23 RX ADMIN — SODIUM CHLORIDE, SODIUM GLUCONATE, SODIUM ACETATE, POTASSIUM CHLORIDE, MAGNESIUM CHLORIDE, SODIUM PHOSPHATE, DIBASIC, AND POTASSIUM PHOSPHATE 50 ML/HR: .53; .5; .37; .037; .03; .012; .00082 INJECTION, SOLUTION INTRAVENOUS at 05:40

## 2024-10-23 RX ADMIN — FOLIC ACID 1 MG: 5 INJECTION, SOLUTION INTRAMUSCULAR; INTRAVENOUS; SUBCUTANEOUS at 21:39

## 2024-10-23 RX ADMIN — CALCIUM GLUCONATE 2 G: 20 INJECTION, SOLUTION INTRAVENOUS at 05:42

## 2024-10-23 RX ADMIN — INSULIN LISPRO 2 UNITS: 100 INJECTION, SOLUTION INTRAVENOUS; SUBCUTANEOUS at 16:49

## 2024-10-23 RX ADMIN — POTASSIUM CHLORIDE 20 MEQ: 14.9 INJECTION, SOLUTION INTRAVENOUS at 07:35

## 2024-10-23 RX ADMIN — MAGNESIUM SULFATE HEPTAHYDRATE 4 G: 40 INJECTION, SOLUTION INTRAVENOUS at 08:36

## 2024-10-23 RX ADMIN — AMLODIPINE BESYLATE 5 MG: 5 TABLET ORAL at 21:39

## 2024-10-23 RX ADMIN — THIAMINE HYDROCHLORIDE 500 MG: 100 INJECTION, SOLUTION INTRAMUSCULAR; INTRAVENOUS at 08:39

## 2024-10-23 RX ADMIN — THIAMINE HYDROCHLORIDE 500 MG: 100 INJECTION, SOLUTION INTRAMUSCULAR; INTRAVENOUS at 21:39

## 2024-10-23 RX ADMIN — SODIUM CHLORIDE, SODIUM GLUCONATE, SODIUM ACETATE, POTASSIUM CHLORIDE, MAGNESIUM CHLORIDE, SODIUM PHOSPHATE, DIBASIC, AND POTASSIUM PHOSPHATE 2000 ML: .53; .5; .37; .037; .03; .012; .00082 INJECTION, SOLUTION INTRAVENOUS at 10:05

## 2024-10-23 RX ADMIN — HYDRALAZINE HYDROCHLORIDE 10 MG: 20 INJECTION, SOLUTION INTRAMUSCULAR; INTRAVENOUS at 07:34

## 2024-10-23 RX ADMIN — CHLORHEXIDINE GLUCONATE 15 ML: 1.2 RINSE ORAL at 08:39

## 2024-10-23 RX ADMIN — INSULIN ASPART 5 UNITS: 100 INJECTION, SUSPENSION SUBCUTANEOUS at 07:33

## 2024-10-23 RX ADMIN — INSULIN LISPRO 3 UNITS: 100 INJECTION, SOLUTION INTRAVENOUS; SUBCUTANEOUS at 06:59

## 2024-10-23 RX ADMIN — AMLODIPINE BESYLATE 5 MG: 5 TABLET ORAL at 11:11

## 2024-10-23 NOTE — PLAN OF CARE
Problem: Potential for Falls  Goal: Patient will remain free of falls  Description: INTERVENTIONS:  - Educate patient/family on patient safety including physical limitations  - Instruct patient to call for assistance with activity   - Consult OT/PT to assist with strengthening/mobility   - Keep Call bell within reach  - Keep bed low and locked with side rails adjusted as appropriate  - Keep care items and personal belongings within reach  - Initiate and maintain comfort rounds  - Make Fall Risk Sign visible to staff  - Offer Toileting every 2 Hours, in advance of need  - Initiate/Maintain bed/chair alarm  - Obtain necessary fall risk management equipment:   - Apply yellow socks and bracelet for high fall risk patients  - Consider moving patient to room near nurses station  Outcome: Progressing     Problem: PAIN - ADULT  Goal: Verbalizes/displays adequate comfort level or baseline comfort level  Description: Interventions:  - Encourage patient to monitor pain and request assistance  - Assess pain using appropriate pain scale  - Administer analgesics based on type and severity of pain and evaluate response  - Implement non-pharmacological measures as appropriate and evaluate response  - Consider cultural and social influences on pain and pain management  - Notify physician/advanced practitioner if interventions unsuccessful or patient reports new pain  Outcome: Progressing     Problem: INFECTION - ADULT  Goal: Absence or prevention of progression during hospitalization  Description: INTERVENTIONS:  - Assess and monitor for signs and symptoms of infection  - Monitor lab/diagnostic results  - Monitor all insertion sites, i.e. indwelling lines, tubes, and drains  - Monitor endotracheal if appropriate and nasal secretions for changes in amount and color  - [place] appropriate cooling/warming therapies per order  - Administer medications as ordered  - Instruct and encourage patient and family to use good hand hygiene  technique  - Identify and instruct in appropriate isolation precautions for identified infection/condition  Outcome: Progressing  Goal: Absence of fever/infection during neutropenic period  Description: INTERVENTIONS:  - Monitor WBC    Outcome: Progressing     Problem: SAFETY ADULT  Goal: Patient will remain free of falls  Description: INTERVENTIONS:  - Educate patient/family on patient safety including physical limitations  - Instruct patient to call for assistance with activity   - Consult OT/PT to assist with strengthening/mobility   - Keep Call bell within reach  - Keep bed low and locked with side rails adjusted as appropriate  - Keep care items and personal belongings within reach  - Initiate and maintain comfort rounds  - Make Fall Risk Sign visible to staff  - Offer Toileting every 2 Hours, in advance of need  - Initiate/Maintain bed/chair alarm  - Obtain necessary fall risk management equipment:   - Apply yellow socks and bracelet for high fall risk patients  - Consider moving patient to room near nurses station  Outcome: Progressing  Goal: Maintain or return to baseline ADL function  Description: INTERVENTIONS:  -  Assess patient's ability to carry out ADLs; assess patient's baseline for ADL function and identify physical deficits which impact ability to perform ADLs (bathing, care of mouth/teeth, toileting, grooming, dressing, etc.)  - Assess/evaluate cause of self-care deficits   - Assess range of motion  - Assess patient's mobility; develop plan if impaired  - Assess patient's need for assistive devices and provide as appropriate  - Encourage maximum independence but intervene and supervise when necessary  - Involve family in performance of ADLs  - Assess for home care needs following discharge   - Consider OT consult to assist with ADL evaluation and planning for discharge  - Provide patient education as appropriate  Outcome: Progressing  Goal: Maintains/Returns to pre admission functional  level  Description: INTERVENTIONS:  - Perform AM-PAC 6 Click Basic Mobility/ Daily Activity assessment daily.  - Set and communicate daily mobility goal to care team and patient/family/caregiver.   - Collaborate with rehabilitation services on mobility goals if consulted  - Reposition patient every 2 hours.  - Out of bed for toileting  - Record patient progress and toleration of activity level   Outcome: Progressing     Problem: DISCHARGE PLANNING  Goal: Discharge to home or other facility with appropriate resources  Description: INTERVENTIONS:  - Identify barriers to discharge w/patient and caregiver  - Arrange for needed discharge resources and transportation as appropriate  - Identify discharge learning needs (meds, wound care, etc.)  - Arrange for interpretive services to assist at discharge as needed  - Refer to Case Management Department for coordinating discharge planning if the patient needs post-hospital services based on physician/advanced practitioner order or complex needs related to functional status, cognitive ability, or social support system  Outcome: Progressing     Problem: Knowledge Deficit  Goal: Patient/family/caregiver demonstrates understanding of disease process, treatment plan, medications, and discharge instructions  Description: Complete learning assessment and assess knowledge base.  Interventions:  - Provide teaching at level of understanding  - Provide teaching via preferred learning methods  Outcome: Progressing     Problem: NEUROSENSORY - ADULT  Goal: Achieves stable or improved neurological status  Description: INTERVENTIONS  - Monitor and report changes in neurological status  - Monitor vital signs such as temperature, blood pressure, glucose, and any other labs ordered   - Initiate measures to prevent increased intracranial pressure  - Monitor for seizure activity and implement precautions if appropriate      Outcome: Progressing  Goal: Remains free of injury related to seizures  activity  Description: INTERVENTIONS  - Maintain airway, patient safety  and administer oxygen as ordered  - Monitor patient for seizure activity, document and report duration and description of seizure to physician/advanced practitioner  - If seizure occurs,  ensure patient safety during seizure  - Reorient patient post seizure  - Seizure pads on all 4 side rails  - Instruct patient/family to notify RN of any seizure activity including if an aura is experienced  - Instruct patient/family to call for assistance with activity based on nursing assessment  - Administer anti-seizure medications if ordered    Outcome: Progressing  Goal: Achieves maximal functionality and self care  Description: INTERVENTIONS  - Monitor swallowing and airway patency with patient fatigue and changes in neurological status  - Encourage and assist patient to increase activity and self care.   - Encourage visually impaired, hearing impaired and aphasic patients to use assistive/communication devices  Outcome: Progressing     Problem: CARDIOVASCULAR - ADULT  Goal: Maintains optimal cardiac output and hemodynamic stability  Description: INTERVENTIONS:  - Monitor I/O, vital signs and rhythm  - Monitor for S/S and trends of decreased cardiac output  - Administer and titrate ordered vasoactive medications to optimize hemodynamic stability  - Assess quality of pulses, skin color and temperature  - Assess for signs of decreased coronary artery perfusion  - Instruct patient to report change in severity of symptoms  Outcome: Progressing  Goal: Absence of cardiac dysrhythmias or at baseline rhythm  Description: INTERVENTIONS:  - Continuous cardiac monitoring, vital signs, obtain 12 lead EKG if ordered  - Administer antiarrhythmic and heart rate control medications as ordered  - Monitor electrolytes and administer replacement therapy as ordered  Outcome: Progressing     Problem: GASTROINTESTINAL - ADULT  Goal: Minimal or absence of nausea and/or  vomiting  Description: INTERVENTIONS:  - Administer IV fluids if ordered to ensure adequate hydration  - Maintain NPO status until nausea and vomiting are resolved  - Nasogastric tube if ordered  - Administer ordered antiemetic medications as needed  - Provide nonpharmacologic comfort measures as appropriate  - Advance diet as tolerated, if ordered  - Consider nutrition services referral to assist patient with adequate nutrition and appropriate food choices  Outcome: Progressing  Goal: Maintains or returns to baseline bowel function  Description: INTERVENTIONS:  - Assess bowel function  - Encourage oral fluids to ensure adequate hydration  - Administer IV fluids if ordered to ensure adequate hydration  - Administer ordered medications as needed  - Encourage mobilization and activity  - Consider nutritional services referral to assist patient with adequate nutrition and appropriate food choices  Outcome: Progressing  Goal: Maintains adequate nutritional intake  Description: INTERVENTIONS:  - Monitor percentage of each meal consumed  - Identify factors contributing to decreased intake, treat as appropriate  - Assist with meals as needed  - Monitor I&O, weight, and lab values if indicated  - Obtain nutrition services referral as needed  Outcome: Progressing  Goal: Oral mucous membranes remain intact  Description: INTERVENTIONS  - Assess oral mucosa and hygiene practices  - Implement preventative oral hygiene regimen  - Implement oral medicated treatments as ordered  - Initiate Nutrition services referral as needed  Outcome: Progressing     Problem: GENITOURINARY - ADULT  Goal: Maintains or returns to baseline urinary function  Description: INTERVENTIONS:  - Assess urinary function  - Encourage oral fluids to ensure adequate hydration if ordered  - Administer IV fluids as ordered to ensure adequate hydration  - Administer ordered medications as needed  - Offer frequent toileting  - Follow urinary retention protocol if  ordered  Outcome: Progressing  Goal: Absence of urinary retention  Description: INTERVENTIONS:  - Assess patient’s ability to void and empty bladder  - Monitor I/O  - Bladder scan as needed  - Discuss with physician/AP medications to alleviate retention as needed  - Discuss catheterization for long term situations as appropriate  Outcome: Progressing     Problem: METABOLIC, FLUID AND ELECTROLYTES - ADULT  Goal: Electrolytes maintained within normal limits  Description: INTERVENTIONS:  - Monitor labs and assess patient for signs and symptoms of electrolyte imbalances  - Administer electrolyte replacement as ordered  - Monitor response to electrolyte replacements, including repeat lab results as appropriate  - Instruct patient on fluid and nutrition as appropriate  Outcome: Progressing  Goal: Fluid balance maintained  Description: INTERVENTIONS:  - Monitor labs   - Monitor I/O and WT  - Instruct patient on fluid and nutrition as appropriate  - Assess for signs & symptoms of volume excess or deficit  Outcome: Progressing  Goal: Glucose maintained within target range  Description: INTERVENTIONS:  - Monitor Blood Glucose as ordered  - Assess for signs and symptoms of hyperglycemia and hypoglycemia  - Administer ordered medications to maintain glucose within target range  - Assess nutritional intake and initiate nutrition service referral as needed  Outcome: Progressing     Problem: SKIN/TISSUE INTEGRITY - ADULT  Goal: Skin Integrity remains intact(Skin Breakdown Prevention)  Description: Assess:  -Perform Jose assessment every shift  -Clean and moisturize skin every day  -Inspect skin when repositioning, toileting, and assisting with ADLS  -Assess under medical devices   -Assess extremities for adequate circulation and sensation     Bed Management:  -Have minimal linens on bed & keep smooth, unwrinkled  -Change linens as needed when moist or perspiring  -Avoid sitting or lying in one position for more than 2 hours  while in bed  -Keep HOB at 45 degrees     Toileting:  -Offer bedside commode  -Assess for incontinence every 2 hours  -Use incontinent care products after each incontinent episode     Activity:  -Mobilize patient 2 times a day  -Encourage activity and walks on unit  -Encourage or provide ROM exercises   -Turn and reposition patient every 2 Hours  -Use appropriate equipment to lift or move patient in bed  -Instruct/ Assist with weight shifting every 30 mins when out of bed in chair    Skin Care:  -Avoid use of baby powder, tape, friction and shearing, hot water or constrictive clothing  -Relieve pressure over bony prominences   -Do not massage red bony areas    Next Steps:  -Teach patient strategies to minimize risks   -Consider consults to  interdisciplinary teams   Outcome: Progressing     Problem: HEMATOLOGIC - ADULT  Goal: Maintains hematologic stability  Description: INTERVENTIONS  - Assess for signs and symptoms of bleeding or hemorrhage  - Monitor labs  - Administer supportive blood products/factors as ordered and appropriate  Outcome: Progressing     Problem: MUSCULOSKELETAL - ADULT  Goal: Maintain or return mobility to safest level of function  Description: INTERVENTIONS:  - Assess patient's ability to carry out ADLs; assess patient's baseline for ADL function and identify physical deficits which impact ability to perform ADLs (bathing, care of mouth/teeth, toileting, grooming, dressing, etc.)  - Assess/evaluate cause of self-care deficits   - Assess range of motion  - Assess patient's mobility  - Assess patient's need for assistive devices and provide as appropriate  - Encourage maximum independence but intervene and supervise when necessary  - Involve family in performance of ADLs  - Assess for home care needs following discharge   - Consider OT consult to assist with ADL evaluation and planning for discharge  - Provide patient education as appropriate  Outcome: Progressing  Goal: Maintain proper alignment  of affected body part  Description: INTERVENTIONS:  - Support, maintain and protect limb and body alignment  - Provide patient/ family with appropriate education  Outcome: Progressing

## 2024-10-23 NOTE — UTILIZATION REVIEW
Initial Clinical Review    Admission: Date/Time/Statement:   Admission Orders (From admission, onward)       Ordered        10/22/24 1630  INPATIENT ADMISSION  Once                          Orders Placed This Encounter   Procedures    INPATIENT ADMISSION     Standing Status:   Standing     Number of Occurrences:   1     Order Specific Question:   Level of Care     Answer:   Level 1 Stepdown [13]     Order Specific Question:   Estimated length of stay     Answer:   More than 2 Midnights     Order Specific Question:   Certification     Answer:   I certify that inpatient services are medically necessary for this patient for a duration of greater than two midnights. See H&P and MD Progress Notes for additional information about the patient's course of treatment.     ED Arrival Information       Expected   -    Arrival   10/22/2024 14:47    Acuity   Emergent              Means of arrival   Ambulance    Escorted by   [facility] ([facility])    Service   Critical Care/ICU    Admission type   Emergency              Arrival complaint   high blood sugar             Chief Complaint   Patient presents with    Hyperglycemia - Symptomatic     Pt arrives EMS, per EMS glucose reads high, known type 2 DM, altered and [place] speaking only, unresponsive and incontinent of urine per EMS on arrival, pt woke up with finger stick, Pt with tonic clonic seizure in triage.       Initial Presentation: 54 y.o. male to ED from home via EMS w/ PMH of T2DM, CKD, paroxysmal A-fib, chronic pancreatitis, alcohol abuse, illicit substance abuse who presents with altered mental status.  Patient appears drowsy and lethargic . Has not been eating x3 days , only drinking juice . Needed help to BR was confused , sweaty and shaky . EMS found high glucose , unresponsive and incontinent of urine . Witnessed tonic clonic seizure in triage and given ativan . Glucose 794 , non an gap , BHB normal , significant lactic acidosis , ph 7.2 . Pt is a heavy  alcohol abuse . Last alcohol unknown , may have been 3 days ago . Admitted IP status to ICU w/ hyperglycemia w/o ketosis plan for IVF , repeat LA 3.1 . Plan insulin gtt , NPO , monitor and replete lytes . Seizures neuro checks , ativan prn , seizure precautions. Alcohol withdraw CIWA . HTN hydralazine prn , labetalol prn .     PE: somnolent , lethargic     Date: 10/23   Day 2:  Given phenobarb for alcoholic withdrawal. Sleepy since. Cont IVF , insulin gtt . cont. Folic acid, Thiamine; replete Mg; give IVF x 2L   BP remains high, resume O/P Amlodipine 5mg daily. Cont. accuchecks with ISS and 70/30 insulin; follow blood sugars and adjust insulin as needed . Okay to downgrade to MS floor     ED Treatment-Medication Administration from 10/22/2024 1447 to 10/22/2024 1840         Date/Time Order Dose Route Action     10/22/2024 1455 LORazepam (ATIVAN) injection 2 mg 2 mg Intravenous Given by Other     10/22/2024 1505 sodium chloride 0.9 % bolus 1,000 mL 1,000 mL Intravenous New Bag     10/22/2024 1645 insulin regular (HumuLIN R,NovoLIN R) 1 Units/mL in sodium chloride 0.9 % 100 mL infusion 12 Units/hr Intravenous New Bag     10/22/2024 1641 LORazepam (ATIVAN) injection 1 mg 1 mg Intravenous Given     10/22/2024 1704 multi-electrolyte (ISOLYTE-S PH 7.4) bolus 1,000 mL 1,000 mL Intravenous New Bag     10/22/2024 1807 multi-electrolyte (ISOLYTE-S PH 7.4) bolus 500 mL/hr Intravenous New Bag     10/22/2024 1806 hydrALAZINE (APRESOLINE) injection 5 mg 5 mg Intravenous Given            Scheduled Medications:  amLODIPine, 5 mg, Oral, BID  chlorhexidine, 15 mL, Mouth/Throat, Q12H JAISON  enoxaparin, 40 mg, Subcutaneous, Q24H JAISON  folic acid 1 mg in sodium chloride 0.9 % 50 mL IVPB, 1 mg, Intravenous, Q24H  insulin aspart protamine-insulin aspart, 5 Units, Subcutaneous, BID AC  insulin lispro, 1-6 Units, Subcutaneous, Q6H JAISON  magnesium sulfate, 4 g, Intravenous, Once  multi-electrolyte, 2,000 mL, Intravenous, Once  thiamine, 500 mg,  Intravenous, TID   Followed by  [START ON 10/25/2024] thiamine, 250 mg, Intravenous, Daily      Continuous IV Infusions:     PRN Meds:  hydrALAZINE, 10 mg, Intravenous, Q4H PRN  labetalol, 10 mg, Intravenous, Q4H PRN      ED Triage Vitals   Temperature Pulse Respirations Blood Pressure SpO2 Pain Score   10/22/24 1501 10/22/24 1500 10/22/24 1500 10/22/24 1500 10/22/24 1500 --   98.1 °F (36.7 °C) 88 18 163/79 99 %      Weight (last 2 days)       Date/Time Weight    10/22/24 2000 56.8 (125.22)            Vital Signs (last 3 days)       Date/Time Temp Pulse Resp BP MAP (mmHg) SpO2 O2 Device Patient Position - Orthostatic VS [place] Coma Scale Score CIWA-Ar Total    10/23/24 0900 -- 86 13 145/80 105 99 % -- -- -- --    10/23/24 0800 97.7 °F (36.5 °C) 80 13 150/79 108 100 % -- -- 13 --    10/23/24 0730 -- 73 15 185/91 130 100 % None (Room air) -- -- --    10/23/24 0700 -- 66 14 177/79 114 100 % None (Room air) -- -- --    10/23/24 0600 -- 67 14 143/69 99 99 % -- -- -- --    10/23/24 0500 -- 71 18 158/73 105 97 % -- -- -- 2    10/23/24 0400 -- 70 14 161/77 110 98 % None (Room air) -- 13 --    10/23/24 0300 97.5 °F (36.4 °C) 72 15 143/72 102 97 % -- -- -- --    10/23/24 0200 -- 72 16 148/72 103 97 % -- -- -- --    10/23/24 0100 -- 72 14 139/63 90 98 % -- -- -- 3    10/23/24 0000 -- 74 16 150/72 103 99 % None (Room air) -- 13 --    10/22/24 2300 97.5 °F (36.4 °C) 77 17 175/78 112 99 % None (Room air) -- -- --    10/22/24 2200 -- 72 14 144/70 100 99 % None (Room air) -- -- --    10/22/24 2100 -- 78 18 136/64 92 99 % -- -- -- 3    10/22/24 2030 -- 79 14 171/81 116 99 % None (Room air) -- -- --    10/22/24 2000 -- -- -- -- -- -- -- -- 13 9    10/22/24 1900 97.9 °F (36.6 °C) 85 16 174/80 121 100 % -- -- -- --    10/22/24 1830 -- 92 16 182/90 127 100 % None (Room air) Sitting -- --    10/22/24 1806 -- -- -- 213/98 -- -- -- -- -- --    10/22/24 1630 -- 83 13 216/95 136 100 % None (Room air) Sitting -- --    10/22/24 1530 -- 92 20  202/97 139 100 % -- Lying 3 --    10/22/24 1529 -- -- -- -- -- -- Nasal cannula -- -- --    10/22/24 1501 98.1 °F (36.7 °C) -- -- -- -- -- -- -- -- --    10/22/24 1500 -- 88 18 163/79 -- 99 % None (Room air) Sitting -- --           CIWA-Ar Score       Row Name 10/23/24 0500 10/23/24 0100 10/22/24 2100       CIWA-Ar    Nausea and Vomiting 0 0 0    Tactile Disturbances 0 0 0    Tremor 0 1 1    Auditory Disturbances 0 0 0    Paroxysmal Sweats 1 1 1    Visual Disturbances 0 0 0    Anxiety 0 0 0    Headache, Fullness in Head 0 0 0    Agitation 0 0 0    Orientation and Clouding of Sensorium 1 1 1    CIWA-Ar Total 2 3 3      Row Name 10/22/24 2000             CIWA-Ar    Nausea and Vomiting 0      Tactile Disturbances 1      Tremor 4      Auditory Disturbances 0      Paroxysmal Sweats 0      Visual Disturbances 0      Anxiety 1      Headache, Fullness in Head 0      Agitation 2      Orientation and Clouding of Sensorium 1      CIWA-Ar Total 9                      Pertinent Labs/Diagnostic Test Results:   Radiology:  No orders to display     Cardiology:  No orders to display     GI:  No orders to display           Results from last 7 days   Lab Units 10/23/24  0451 10/22/24  1507   WBC Thousand/uL 10.91* 14.83*   HEMOGLOBIN g/dL 11.1* 11.9*   HEMATOCRIT % 30.4* 32.7*   PLATELETS Thousands/uL 185 238   TOTAL NEUT ABS Thousands/µL  --  9.29*         Results from last 7 days   Lab Units 10/23/24  0451 10/22/24  1953 10/22/24  1507   SODIUM mmol/L 139 140 133*   POTASSIUM mmol/L 3.6 3.2* 3.5   CHLORIDE mmol/L 103 100 93*   CO2 mmol/L 32 28 27   ANION GAP mmol/L 4 12 13   BUN mg/dL 6 7 8   CREATININE mg/dL 1.06 1.14 1.57*   EGFR ml/min/1.73sq m 79 72 49   CALCIUM mg/dL 8.0* 7.2* 8.5   CALCIUM, IONIZED mmol/L 1.10*  --   --    MAGNESIUM mg/dL 1.5* 2.0  --    PHOSPHORUS mg/dL 4.4 1.4*  --      Results from last 7 days   Lab Units 10/22/24  1507   AST U/L 32   ALT U/L 31   ALK PHOS U/L 112*   TOTAL PROTEIN g/dL 5.8*   ALBUMIN g/dL  3.1*   TOTAL BILIRUBIN mg/dL 0.61   AMMONIA umol/L 35     Results from last 7 days   Lab Units 10/23/24  0610 10/23/24  0451 10/23/24  0307 10/23/24  0035 10/22/24  2348 10/22/24  2307 10/22/24  2201 10/22/24  2104 10/22/24  1952 10/22/24  1844 10/22/24  1800 10/22/24  1456   POC GLUCOSE mg/dl 231* 219* 166* 102 79 94 107 147* 270* 440* 221* >600*     Results from last 7 days   Lab Units 10/23/24  0451 10/22/24  1953 10/22/24  1507   GLUCOSE RANDOM mg/dL 225* 241* 794*     Results from last 7 days   Lab Units 10/22/24  1953 10/22/24  1713   OSMOLALITY, SERUM mmol/ 315*         Beta- Hydroxybutyrate   Date Value Ref Range Status   10/22/2024 0.08 0.02 - 0.27 mmol/L Final   04/10/2024 <0.05 0.02 - 0.27 mmol/L Final     BETA-HYDROXYBUTYRATE   Date Value Ref Range Status   01/25/2024 0.2 <0.6 mmol/L Final   10/17/2023 0.1 <0.6 mmol/L Final   02/02/2019 0.16 0.02 - 0.27 mmol/L Final          Results from last 7 days   Lab Units 10/23/24  0451 10/22/24  1507   PH [place]  7.404* 7.249*   PCO2 [place] mm Hg 46.9 52.7*   PO2 [place] mm Hg 48.4* 106.8*   HCO3 [place] mmol/L 28.7 22.5*   BASE EXC [place] mmol/L 3.3 -5.0   O2 CONTENT [place] ml/dL 13.5 16.1   O2 HGB, VENOUS % 84.8* 94.7*         Results from last 7 days   Lab Units 10/22/24  1507   CK TOTAL U/L 26*     Results from last 7 days   Lab Units 10/22/24  1507   HS TNI 0HR ng/L 12         Results from last 7 days   Lab Units 10/22/24  1712 10/22/24  1507   LACTIC ACID mmol/L 3.1* 9.9*           Results from last 7 days   Lab Units 10/22/24  1953 10/22/24  1713   OSMOLALITY, SERUM mmol/ 315*     Results from last 7 days   Lab Units 10/22/24  1523   CLARITY UA  Clear   COLOR UA  Colorless   SPEC GRAV UA  1.021   PH UA  5.5   GLUCOSE UA mg/dl >=1000 (1%)*   KETONES UA mg/dl Negative   BLOOD UA  Trace*   PROTEIN UA mg/dl 100 (2+)*   NITRITE UA  Negative   BILIRUBIN UA  Negative   UROBILINOGEN UA (BE) mg/dl <2.0   LEUKOCYTES UA  Elevated glucose may cause decreased leukocyte values.  See urine microscopic for UWBC result*   WBC UA /hpf None Seen   RBC UA /hpf 1-2   BACTERIA UA /hpf None Seen   EPITHELIAL CELLS WET PREP /hpf None Seen       Results from last 7 days   Lab Units 10/22/24  1523   AMPH/METH  Negative   BARBITURATE UR  Negative   BENZODIAZEPINE UR  Negative   COCAINE UR  Negative   METHADONE URINE  Negative   OPIATE UR  Negative   PCP UR  Negative   THC UR  Negative     Results from last 7 days   Lab Units 10/22/24  1507   ETHANOL LVL mg/dL <10   ACETAMINOPHEN LVL ug/mL <2*   SALICYLATE LVL mg/dL <5       Past Medical History:   Diagnosis Date    Alcohol abuse     Chronic pancreatitis (HCC)     Diabetes mellitus (HCC)     Type 2    Pancreatitis     Paroxysmal A-fib (HCC)     Thrombocytopenia (HCC)      Present on Admission:   Type 2 diabetes mellitus (HCC)   Seizure (HCC)   Alcohol withdrawal (HCC)   Acute encephalopathy      Admitting Diagnosis: Alcohol withdrawal (HCC) [F10.939]  Acidosis, lactic [E87.20]  Seizure (HCC) [R56.9]  Hyperglycemia [R73.9]  Toxic metabolic encephalopathy [G92.8]  Age/Sex: 54 y.o. male    Network Utilization Review Department  ATTENTION: Please call with any questions or concerns to [phone] and carefully listen to the prompts so that you are directed to the right person. All voicemails are confidential.   For Discharge needs, contact Care Management DC Support Team at [phone] opt. 2  Send all requests for admission clinical reviews, approved or denied determinations and any other requests to dedicated fax number below belonging to the campus where the patient is receiving treatment. List of dedicated fax numbers for the Facilities:  FACILITY NAME UR FAX NUMBER   ADMISSION DENIALS (Administrative/Medical Necessity) [phone]   DISCHARGE SUPPORT TEAM (NETWORK) [phone]   PARENT CHILD HEALTH (Maternity/NICU/Pediatrics) [phone]   [facility] [phone]   [facility] [phone]   [facility]  [facility] [phone]   [facility] [phone]   [facility] [phone]   [facility] [phone]   [facility] [phone]   [facility] [phone]   [facility] [phone]   [facility] [phone]   [facility] [phone]   [facility] [phone]

## 2024-10-23 NOTE — CERTIFIED RECOVERY SPECIALIST
Certified  Note    Patient name: Wes Araujo  Location: ICU 02/ICU 02  [place]: [facility]  Attending:  Pooja Lopez DO MRN [id number]  : 1970  Age: 54 y.o.    Sex: male Date 10/23/2024         Substance Use History:     Social History     Substance and Sexual Activity   Alcohol Use Yes        Social History     Substance and Sexual Activity   Drug Use Yes    Types: Cocaine     CRS messaged with providers who said patient is not currently coherent enough to have conversation and later would be better.    CRS to follow through this admission.               '          Carolin Nicholson

## 2024-10-23 NOTE — CASE MANAGEMENT
Case Management Discharge Planning Note    Patient name Wes Araujo  Location ICU ICU  MRN [id number]  : 1970 Date 10/23/2024       Current Admission Date: 10/22/2024  Current Admission Diagnosis:Acute encephalopathy   Patient Active Problem List    Diagnosis Date Noted Date Diagnosed    Hyperglycemia without ketosis 10/22/2024     Hypertensive urgency 10/22/2024     Transaminitis 2024     Alcohol-induced chronic pancreatitis (HCC) 2024     Acute low back pain 03/10/2024     SIRS (systemic inflammatory response syndrome) (HCC) 2024     Hyperosmolar hyperglycemic state (HHS) (HCC) 2024     Alcohol abuse with history of withdrawal (HCC) 2024     Elevated brain natriuretic peptide (BNP) level 2024     Type 2 diabetes mellitus (HCC) 2024     Chronic low back pain 2023     Alcohol withdrawal (HCC) 2023     Diarrhea 2023     Seizure (HCC) 2022     Illicit drug use 2022     Leukocytosis 2022     Hypothermia 2022     Cocaine use 2022     Abnormal chest x-ray 2022     Bilateral hearing loss 2020     Hypoglycemia 2020     Hypokalemia 2020     Acute renal failure superimposed on stage 3 chronic kidney disease (HCC) 2019     Anemia 2019     Hyponatremia 2019     History of atrial fibrillation 2019     Acute encephalopathy 2019     Alcohol intoxication in active alcoholic with complication (HCC) 2016     Primary hypertension      Type 2 diabetes mellitus with hypoglycemia, with long-term current use of insulin (HCC)      Uncomplicated alcohol dependence (HCC)      Paroxysmal A-fib (HCC)      Chronic pancreatitis (HCC)      Thrombocytopenia (HCC)        LOS (days): 1  Geometric Mean LOS (GMLOS) (days):   Days to GMLOS:     OBJECTIVE:  Risk of Unplanned Readmission Score: 25.52         Current admission status: Inpatient   Preferred Pharmacy:    [facility] -  [place] - [address]  [address] [facility] [place] [address]  Phone: [phone] Fax: [phone]    [facility] - [place] - [address]  [facility],  [address]  [facility],  First [address] [facility] [address]  Phone: [phone] Fax: [phone]    [id number] - [place] - [address] [facility]  [address] [facility] [place] [address]  Phone: [phone] Fax: [phone]    Primary Care Provider: Rush Kebede MD    Primary Insurance: [employer]  Secondary Insurance:     DISCHARGE DETAILS:      Contacts  Patient Contacts: Wes Araujo  Relationship to Patient:: Family  Contact Method: Phone  Phone Number: [phone]  Reason/Outcome: Discharge Planning        Additional Comments: CM intake and discharge planning could not be completed at this time. The patient was very somnolent but arousable at the time of CM's visit. CM will follow up at a later time for assessment.

## 2024-10-23 NOTE — UTILIZATION REVIEW
NOTIFICATION OF INPATIENT ADMISSION   AUTHORIZATION REQUEST   SERVICING FACILITY:   [place] [address]  Tax ID: [id number]  NPI: [id number] ATTENDING PROVIDER:  Attending Name and NPI#: Pooja Lopez Do [[id number]]  Address: [address] [place] [address]  Phone: [phone]     ADMISSION INFORMATION:  Place of Service: Inpatient [facility]  Place of Service Code: 21  Inpatient Admission Date/Time: 10/22/24  4:30 PM  Discharge Date/Time: No discharge date for patient encounter.  Admitting Diagnosis Code/Description:  Alcohol withdrawal (HCC) [F10.939]  Acidosis, lactic [E87.20]  Seizure (HCC) [R56.9]  Hyperglycemia [R73.9]  Toxic metabolic encephalopathy [G92.8]     UTILIZATION REVIEW CONTACT:  Katie Almeida, Utilization   Network Utilization Review Department  Phone: [phone]  Fax [phone]  Email: [email or web address]  Contact for approvals/pending authorizations, clinical reviews, and discharge.     PHYSICIAN ADVISORY SERVICES:  Medical Necessity Denial & [place] Review  Phone: [phone]  Fax: [phone]  Email: [email or web address]     DISCHARGE SUPPORT TEAM:  For Patients Discharge Needs & Updates  Phone: [phone] opt. 2 Fax: [phone]  Email: [email or web address]

## 2024-10-23 NOTE — PLAN OF CARE
Problem: Potential for Falls  Goal: Patient will remain free of falls  Description: INTERVENTIONS:  - Educate patient/family on patient safety including physical limitations  - Instruct patient to call for assistance with activity   - Consult OT/PT to assist with strengthening/mobility   - Keep Call bell within reach  - Keep bed low and locked with side rails adjusted as appropriate  - Keep care items and personal belongings within reach  - Initiate and maintain comfort rounds  - Make Fall Risk Sign visible to staff  - Offer Toileting every 2 Hours, in advance of need  - Initiate/Maintain bed alarm  - Apply yellow socks and bracelet for high fall risk patients  - Consider moving patient to room near nurses station  Outcome: Progressing     Problem: PAIN - ADULT  Goal: Verbalizes/displays adequate comfort level or baseline comfort level  Description: Interventions:  - Encourage patient to monitor pain and request assistance  - Assess pain using appropriate pain scale  - Administer analgesics based on type and severity of pain and evaluate response  - Implement non-pharmacological measures as appropriate and evaluate response  - Consider cultural and social influences on pain and pain management  - Notify physician/advanced practitioner if interventions unsuccessful or patient reports new pain  Outcome: Progressing     Problem: INFECTION - ADULT  Goal: Absence or prevention of progression during hospitalization  Description: INTERVENTIONS:  - Assess and monitor for signs and symptoms of infection  - Monitor lab/diagnostic results  - Monitor all insertion sites, i.e. indwelling lines, tubes, and drains  - Monitor endotracheal if appropriate and nasal secretions for changes in amount and color  - [place] appropriate cooling/warming therapies per order  - Administer medications as ordered  - Instruct and encourage patient and family to use good hand hygiene technique  - Identify and instruct in appropriate isolation  precautions for identified infection/condition  Outcome: Progressing  Goal: Absence of fever/infection during neutropenic period  Description: INTERVENTIONS:  - Monitor WBC    Outcome: Progressing     Problem: SAFETY ADULT  Goal: Patient will remain free of falls  Description: INTERVENTIONS:  - Educate patient/family on patient safety including physical limitations  - Instruct patient to call for assistance with activity   - Consult OT/PT to assist with strengthening/mobility   - Keep Call bell within reach  - Keep bed low and locked with side rails adjusted as appropriate  - Keep care items and personal belongings within reach  - Initiate and maintain comfort rounds  - Make Fall Risk Sign visible to staff  - Offer Toileting every 2 Hours, in advance of need  - Initiate/Maintain bed alarm  - Apply yellow socks and bracelet for high fall risk patients  - Consider moving patient to room near nurses station  Outcome: Progressing  Goal: Maintain or return to baseline ADL function  Description: INTERVENTIONS:  -  Assess patient's ability to carry out ADLs; assess patient's baseline for ADL function and identify physical deficits which impact ability to perform ADLs (bathing, care of mouth/teeth, toileting, grooming, dressing, etc.)  - Assess/evaluate cause of self-care deficits   - Assess range of motion  - Assess patient's mobility; develop plan if impaired  - Assess patient's need for assistive devices and provide as appropriate  - Encourage maximum independence but intervene and supervise when necessary  - Involve family in performance of ADLs  - Assess for home care needs following discharge   - Consider OT consult to assist with ADL evaluation and planning for discharge  - Provide patient education as appropriate  Outcome: Progressing  Goal: Maintains/Returns to pre admission functional level  Description: INTERVENTIONS:  - Perform AM-PAC 6 Click Basic Mobility/ Daily Activity assessment daily.  - Set and  communicate daily mobility goal to care team and patient/family/caregiver.   - Collaborate with rehabilitation services on mobility goals if consulted  - Perform Range of Motion 3 times a day.  - Reposition patient every 2 hours.  - Out of bed to chair 3 times a day   - Out of bed for meals 3 times a day  - Out of bed for toileting  - Record patient progress and toleration of activity level   Outcome: Progressing     Problem: DISCHARGE PLANNING  Goal: Discharge to home or other facility with appropriate resources  Description: INTERVENTIONS:  - Identify barriers to discharge w/patient and caregiver  - Arrange for needed discharge resources and transportation as appropriate  - Identify discharge learning needs (meds, wound care, etc.)  - Arrange for interpretive services to assist at discharge as needed  - Refer to Case Management Department for coordinating discharge planning if the patient needs post-hospital services based on physician/advanced practitioner order or complex needs related to functional status, cognitive ability, or social support system  Outcome: Progressing     Problem: Knowledge Deficit  Goal: Patient/family/caregiver demonstrates understanding of disease process, treatment plan, medications, and discharge instructions  Description: Complete learning assessment and assess knowledge base.  Interventions:  - Provide teaching at level of understanding  - Provide teaching via preferred learning methods  Outcome: Progressing     Problem: NEUROSENSORY - ADULT  Goal: Achieves stable or improved neurological status  Description: INTERVENTIONS  - Monitor and report changes in neurological status  - Monitor vital signs such as temperature, blood pressure, glucose, and any other labs ordered   - Initiate measures to prevent increased intracranial pressure  - Monitor for seizure activity and implement precautions if appropriate      Outcome: Progressing  Goal: Remains free of injury related to seizures  activity  Description: INTERVENTIONS  - Maintain airway, patient safety  and administer oxygen as ordered  - Monitor patient for seizure activity, document and report duration and description of seizure to physician/advanced practitioner  - If seizure occurs,  ensure patient safety during seizure  - Reorient patient post seizure  - Seizure pads on all 4 side rails  - Instruct patient/family to notify RN of any seizure activity including if an aura is experienced  - Instruct patient/family to call for assistance with activity based on nursing assessment  - Administer anti-seizure medications if ordered    Outcome: Progressing  Goal: Achieves maximal functionality and self care  Description: INTERVENTIONS  - Monitor swallowing and airway patency with patient fatigue and changes in neurological status  - Encourage and assist patient to increase activity and self care.   - Encourage visually impaired, hearing impaired and aphasic patients to use assistive/communication devices  Outcome: Progressing     Problem: CARDIOVASCULAR - ADULT  Goal: Maintains optimal cardiac output and hemodynamic stability  Description: INTERVENTIONS:  - Monitor I/O, vital signs and rhythm  - Monitor for S/S and trends of decreased cardiac output  - Administer and titrate ordered vasoactive medications to optimize hemodynamic stability  - Assess quality of pulses, skin color and temperature  - Assess for signs of decreased coronary artery perfusion  - Instruct patient to report change in severity of symptoms  Outcome: Progressing  Goal: Absence of cardiac dysrhythmias or at baseline rhythm  Description: INTERVENTIONS:  - Continuous cardiac monitoring, vital signs, obtain 12 lead EKG if ordered  - Administer antiarrhythmic and heart rate control medications as ordered  - Monitor electrolytes and administer replacement therapy as ordered  Outcome: Progressing     Problem: GASTROINTESTINAL - ADULT  Goal: Minimal or absence of nausea and/or  vomiting  Description: INTERVENTIONS:  - Administer IV fluids if ordered to ensure adequate hydration  - Maintain NPO status until nausea and vomiting are resolved  - Nasogastric tube if ordered  - Administer ordered antiemetic medications as needed  - Provide nonpharmacologic comfort measures as appropriate  - Advance diet as tolerated, if ordered  - Consider nutrition services referral to assist patient with adequate nutrition and appropriate food choices  Outcome: Progressing  Goal: Maintains or returns to baseline bowel function  Description: INTERVENTIONS:  - Assess bowel function  - Encourage oral fluids to ensure adequate hydration  - Administer IV fluids if ordered to ensure adequate hydration  - Administer ordered medications as needed  - Encourage mobilization and activity  - Consider nutritional services referral to assist patient with adequate nutrition and appropriate food choices  Outcome: Progressing  Goal: Maintains adequate nutritional intake  Description: INTERVENTIONS:  - Monitor percentage of each meal consumed  - Identify factors contributing to decreased intake, treat as appropriate  - Assist with meals as needed  - Monitor I&O, weight, and lab values if indicated  - Obtain nutrition services referral as needed  Outcome: Progressing  Goal: Oral mucous membranes remain intact  Description: INTERVENTIONS  - Assess oral mucosa and hygiene practices  - Implement preventative oral hygiene regimen  - Implement oral medicated treatments as ordered  - Initiate Nutrition services referral as needed  Outcome: Progressing     Problem: GENITOURINARY - ADULT  Goal: Maintains or returns to baseline urinary function  Description: INTERVENTIONS:  - Assess urinary function  - Encourage oral fluids to ensure adequate hydration if ordered  - Administer IV fluids as ordered to ensure adequate hydration  - Administer ordered medications as needed  - Offer frequent toileting  - Follow urinary retention protocol if  ordered  Outcome: Progressing  Goal: Absence of urinary retention  Description: INTERVENTIONS:  - Assess patient’s ability to void and empty bladder  - Monitor I/O  - Bladder scan as needed  - Discuss with physician/AP medications to alleviate retention as needed  - Discuss catheterization for long term situations as appropriate  Outcome: Progressing     Problem: METABOLIC, FLUID AND ELECTROLYTES - ADULT  Goal: Electrolytes maintained within normal limits  Description: INTERVENTIONS:  - Monitor labs and assess patient for signs and symptoms of electrolyte imbalances  - Administer electrolyte replacement as ordered  - Monitor response to electrolyte replacements, including repeat lab results as appropriate  - Instruct patient on fluid and nutrition as appropriate  Outcome: Progressing  Goal: Fluid balance maintained  Description: INTERVENTIONS:  - Monitor labs   - Monitor I/O and WT  - Instruct patient on fluid and nutrition as appropriate  - Assess for signs & symptoms of volume excess or deficit  Outcome: Progressing  Goal: Glucose maintained within target range  Description: INTERVENTIONS:  - Monitor Blood Glucose as ordered  - Assess for signs and symptoms of hyperglycemia and hypoglycemia  - Administer ordered medications to maintain glucose within target range  - Assess nutritional intake and initiate nutrition service referral as needed  Outcome: Progressing     Problem: SKIN/TISSUE INTEGRITY - ADULT  Goal: Skin Integrity remains intact(Skin Breakdown Prevention)  Description: Assess:  -Perform Jose assessment every day  -Clean and moisturize skin every day  -Inspect skin when repositioning, toileting, and assisting with ADLS  -Assess extremities for adequate circulation and sensation     Bed Management:  -Have minimal linens on bed & keep smooth, unwrinkled  -Change linens as needed when moist or perspiring  -Avoid sitting or lying in one position for more than 2 hours while in bed  -Keep HOB at 30 degrees      Toileting:  -Offer bedside commode  -Use incontinent care products after each incontinent episode such as hydroguard    Activity:  -Mobilize patient 3 times a day  -Encourage activity and walks on unit  -Encourage or provide ROM exercises   -Turn and reposition patient every 2 Hours  -Use appropriate equipment to lift or move patient in bed  -Instruct/ Assist with weight shifting every 2 hours when out of bed in chair  -Consider limitation of chair time 2 hour intervals    Skin Care:  -Avoid use of baby powder, tape, friction and shearing, hot water or constrictive clothing  -Relieve pressure over bony prominences using allyven  -Do not massage red bony areas     Problem: HEMATOLOGIC - ADULT  Goal: Maintains hematologic stability  Description: INTERVENTIONS  - Assess for signs and symptoms of bleeding or hemorrhage  - Monitor labs  - Administer supportive blood products/factors as ordered and appropriate  Outcome: Progressing     Problem: MUSCULOSKELETAL - ADULT  Goal: Maintain or return mobility to safest level of function  Description: INTERVENTIONS:  - Assess patient's ability to carry out ADLs; assess patient's baseline for ADL function and identify physical deficits which impact ability to perform ADLs (bathing, care of mouth/teeth, toileting, grooming, dressing, etc.)  - Assess/evaluate cause of self-care deficits   - Assess range of motion  - Assess patient's mobility  - Assess patient's need for assistive devices and provide as appropriate  - Encourage maximum independence but intervene and supervise when necessary  - Involve family in performance of ADLs  - Assess for home care needs following discharge   - Consider OT consult to assist with ADL evaluation and planning for discharge  - Provide patient education as appropriate  Outcome: Progressing  Goal: Maintain proper alignment of affected body part  Description: INTERVENTIONS:  - Support, maintain and protect limb and body alignment  - Provide  patient/ family with appropriate education  Outcome: Progressing     Problem: Prexisting or High Potential for Compromised Skin Integrity  Goal: Skin integrity is maintained or improved  Description: INTERVENTIONS:  - Identify patients at risk for skin breakdown  - Assess and monitor skin integrity  - Assess and monitor nutrition and hydration status  - Monitor labs   - Assess for incontinence   - Turn and reposition patient  - Assist with mobility/ambulation  - Relieve pressure over bony prominences  - Avoid friction and shearing  - Provide appropriate hygiene as needed including keeping skin clean and dry  - Evaluate need for skin moisturizer/barrier cream  - Collaborate with interdisciplinary team   - Patient/family teaching  - Consider wound care consult   Outcome: Progressing

## 2024-10-23 NOTE — PROGRESS NOTES
Progress Note - Internal Medicine   Name: Wes Araujo 54 y.o. male I MRN: [id number]  Unit/Bed#: ICU 02 I Date of Admission: 10/22/2024   Date of Service: 10/23/2024 I Hospital Day: 1     Assessment & Plan  Hyperglycemia without ketosis  On admission, glucose was 794  VBG showed acidosis, likely mixed respiratory and metabolic  Lactic acid was elevated at 9.9, repeat is 3.1  Anion gap was normal, BHB was normal  Possibly due to medication noncompliance vs binge drinking    Plan:  Continue fluids  Weight based insulin drip 0.1 units/kg/hr  When serum glucose is below 300, reduce infusion rate to 0.02-0.05 units/kg/hr  Monitor and replete electrolytes q4 hours  Monitor serum osmolality q4 hours  Level to change by <3 or risk of causing cerebral edema  NPO  Hypoglycemic protocol  Seizure (HCC)  Had witnessed tonic clonic seizure in the hospital today  Has had these episodes in the past  Differential is tonic clonic seizure vs alcoholic withdrawal seizure vs metabolic process   UDS was negative    Plan:  Neurochecks  Ativan as needed for seizures  Seizure precautions  Alcohol withdrawal (HCC)  Patient's possible likely last drink was 3 days ago.  Could be having seizures due to alcohol withdrawal.    Plan:  CIWA protocol   Acute encephalopathy  Currently drowsy and lethargic.  In the setting of hyperglycemia vs alcoholic withdrawal    Plan:  Maintain normoglycemia  Continue to monitor at current time.     Type 2 diabetes mellitus (HCC)    Lab Results   Component Value Date    HGBA1C 15.7 (H) 01/26/2024     Has uncontrolled diabetes mellitus  On insulin at home, but unsure of compliance.   Hypertensive urgency  Blood pressure was elevated at 216/95    Plan:   Will order PRN hydralazine for SBP>180  Can order PRN Labetalol as second line.     Assessment & Plan  Hyperglycemia without ketosis  On admission, glucose was 794  VBG showed acidosis, likely mixed respiratory and metabolic  Lactic acid was elevated at 9.9,  repeat is 3.1  Anion gap was normal, BHB was normal  Possibly due to medication noncompliance vs binge drinking    Plan:  Continue fluids  Weight based insulin drip 0.1 units/kg/hr  When serum glucose is below 300, reduce infusion rate to 0.02-0.05 units/kg/hr  Monitor and replete electrolytes q4 hours  Monitor serum osmolality q4 hours  Level to change by <3 or risk of causing cerebral edema  NPO  Hypoglycemic protocol  Seizure (HCC)  Had witnessed tonic clonic seizure in the hospital today  Has had these episodes in the past  Differential is tonic clonic seizure vs alcoholic withdrawal seizure vs metabolic process   UDS was negative    Plan:  Neurochecks  Ativan as needed for seizures  Seizure precautions  Alcohol withdrawal ([facility])  Patient's possible likely last drink was 3 days ago.  Could be having seizures due to alcohol withdrawal.    Plan:  CIWA protocol   Acute encephalopathy  Currently drowsy and lethargic.  In the setting of hyperglycemia vs alcoholic withdrawal    Plan:  Maintain normoglycemia  Continue to monitor at current time.     Type 2 diabetes mellitus ([facility])    Lab Results   Component Value Date    HGBA1C 15.7 (H) 01/26/2024     Has uncontrolled diabetes mellitus  On insulin at home, but unsure of compliance.   Hypertensive urgency  Blood pressure was elevated at 216/95    Plan:   Will order PRN hydralazine for SBP>180  Can order PRN Labetalol as second line.   Disposition: Critical care    ICU Core Measures     A: Assess, Prevent, and Manage Pain Has pain been assessed? Yes  Need for changes to pain regimen? No   B: Both SAT/SAT  N/A   C: Choice of Sedation RASS Goal: 0 Alert and Calm  Need for changes to sedation or analgesia regimen? No   D: Delirium CAM-ICU: Negative   E: Early Mobility  Plan for early mobility? Yes   F: Family Engagement Plan for family engagement today? Yes         Prophylaxis:  VTE    Stress Ulcer  not ordered         24 Hour Events : Given phenobarb for alcoholic  withdrawal. Sleepy since.   Subjective   Review of Systems: See HPI for Review of Systems    Objective :                   Vitals I/O      Most Recent Min/Max in 24hrs   Temp 97.5 °F (36.4 °C) Temp  Min: 97.5 °F (36.4 °C)  Max: 98.1 °F (36.7 °C)   Pulse 71 Pulse  Min: 70  Max: 92   Resp 18 Resp  Min: 13  Max: 20   /73 BP  Min: 136/64  Max: 216/95   O2 Sat 97 % SpO2  Min: 97 %  Max: 100 %      Intake/Output Summary (Last 24 hours) at 10/23/2024 0649  Last data filed at 10/23/2024 0543  Gross per 24 hour   Intake 4283.24 ml   Output 850 ml   Net 3433.24 ml       Diet NPO    Invasive Monitoring           Physical Exam   Physical Exam  Vitals reviewed.   Skin:     General: Skin is warm.      Coloration: Skin is not jaundiced.   HENT:      Head: Normocephalic.      Mouth/Throat:      Mouth: Mucous membranes are dry.   Cardiovascular:      Rate and Rhythm: Normal rate and regular rhythm.      Pulses: Normal pulses.      Heart sounds: No murmur heard.     No friction rub. No gallop.   Musculoskeletal:      Right lower leg: No edema.      Left lower leg: No edema.   Abdominal: General: Bowel sounds are normal.      Palpations: Abdomen is soft.      Tenderness: There is no abdominal tenderness.   Constitutional:       General: He is in acute distress.      Appearance: He is ill-appearing. He is not toxic-appearing.      Interventions: He is not sedated, not intubated and not restrained.  Pulmonary:      Effort: Pulmonary effort is normal. No accessory muscle usage, respiratory distress or accessory muscle usage. He is not intubated.      Breath sounds: No wheezing, rhonchi or rales.   Neurological:      Mental Status: He is somnolent.      Comments: letahrgic          Diagnostic Studies        Lab Results: I have reviewed the following results:     Medications:  Scheduled PRN   amLODIPine, 5 mg, BID  chlorhexidine, 15 mL, Q12H JAISON  folic acid 1 mg in sodium chloride 0.9 % 50 mL IVPB, 1 mg, Q24H  insulin aspart  protamine-insulin aspart, 5 Units, BID AC  insulin lispro, 1-6 Units, Q6H JAISON  potassium chloride, 20 mEq, Q2H  thiamine, 500 mg, TID   Followed by  [START ON 10/25/2024] thiamine, 250 mg, Daily      hydrALAZINE, 10 mg, Q4H PRN  labetalol, 10 mg, Q4H PRN       Continuous    multi-electrolyte, 50 mL/hr, Last Rate: 50 mL/hr (10/23/24 0540)         Labs:   CBC    Recent Labs     10/22/24  1507 10/23/24  0451   WBC 14.83* 10.91*   HGB 11.9* 11.1*   HCT 32.7* 30.4*    185     BMP    Recent Labs     10/22/24  1953 10/23/24  0451   SODIUM 140 139   K 3.2* 3.6    103   CO2 28 32   AGAP 12 4   BUN 7 6   CREATININE 1.14 1.06   CALCIUM 7.2* 8.0*       Coags    No recent results     Additional Electrolytes  Recent Labs     10/22/24  1953 10/23/24  0451   MG 2.0 1.5*   PHOS 1.4* 4.4   CAIONIZED  --  1.10*          Blood Gas    No recent results  Recent Labs     10/23/24  0451   PHVEN 7.404*   MXC9HZS 46.9   PO2VEN 48.4*   IMB8LDS 28.7   BEVEN 3.3   S5STLNT 84.8*    LFTs  Recent Labs     10/22/24  1507   ALT 31   AST 32   ALKPHOS 112*   ALB 3.1*   TBILI 0.61       Infectious  No recent results  Glucose  Recent Labs     10/22/24  1507 10/22/24  1953 10/23/24  0451   GLUC 794* 241* 225*

## 2024-10-24 LAB
ALBUMIN SERPL BCG-MCNC: 2.3 G/DL (ref 3.5–5)
ALP SERPL-CCNC: 79 U/L (ref 34–104)
ALT SERPL W P-5'-P-CCNC: 16 U/L (ref 7–52)
ANION GAP SERPL CALCULATED.3IONS-SCNC: 3 MMOL/L (ref 4–13)
AST SERPL W P-5'-P-CCNC: 15 U/L (ref 13–39)
BASOPHILS # BLD AUTO: 0.03 THOUSANDS/ΜL (ref 0–0.1)
BASOPHILS NFR BLD AUTO: 0 % (ref 0–1)
BILIRUB SERPL-MCNC: 0.7 MG/DL (ref 0.2–1)
BUN SERPL-MCNC: 8 MG/DL (ref 5–25)
CALCIUM ALBUM COR SERPL-MCNC: 9.1 MG/DL (ref 8.3–10.1)
CALCIUM SERPL-MCNC: 7.7 MG/DL (ref 8.4–10.2)
CHLORIDE SERPL-SCNC: 103 MMOL/L (ref 96–108)
CO2 SERPL-SCNC: 28 MMOL/L (ref 21–32)
CREAT SERPL-MCNC: 1.31 MG/DL (ref 0.6–1.3)
EOSINOPHIL # BLD AUTO: 0.22 THOUSAND/ΜL (ref 0–0.61)
EOSINOPHIL NFR BLD AUTO: 2 % (ref 0–6)
ERYTHROCYTE [DISTWIDTH] IN BLOOD BY AUTOMATED COUNT: 11.3 % (ref 11.6–15.1)
GFR SERPL CREATININE-BSD FRML MDRD: 61 ML/MIN/1.73SQ M
GLUCOSE SERPL-MCNC: 155 MG/DL (ref 65–140)
GLUCOSE SERPL-MCNC: 162 MG/DL (ref 65–140)
GLUCOSE SERPL-MCNC: 183 MG/DL (ref 65–140)
GLUCOSE SERPL-MCNC: 184 MG/DL (ref 65–140)
GLUCOSE SERPL-MCNC: 249 MG/DL (ref 65–140)
HCT VFR BLD AUTO: 29.6 % (ref 36.5–49.3)
HGB BLD-MCNC: 10.9 G/DL (ref 12–17)
IMM GRANULOCYTES # BLD AUTO: 0.03 THOUSAND/UL (ref 0–0.2)
IMM GRANULOCYTES NFR BLD AUTO: 0 % (ref 0–2)
LYMPHOCYTES # BLD AUTO: 2.29 THOUSANDS/ΜL (ref 0.6–4.47)
LYMPHOCYTES NFR BLD AUTO: 22 % (ref 14–44)
MAGNESIUM SERPL-MCNC: 2 MG/DL (ref 1.9–2.7)
MCH RBC QN AUTO: 31.2 PG (ref 26.8–34.3)
MCHC RBC AUTO-ENTMCNC: 36.8 G/DL (ref 31.4–37.4)
MCV RBC AUTO: 85 FL (ref 82–98)
MONOCYTES # BLD AUTO: 0.65 THOUSAND/ΜL (ref 0.17–1.22)
MONOCYTES NFR BLD AUTO: 6 % (ref 4–12)
NEUTROPHILS # BLD AUTO: 7.27 THOUSANDS/ΜL (ref 1.85–7.62)
NEUTS SEG NFR BLD AUTO: 70 % (ref 43–75)
NRBC BLD AUTO-RTO: 0 /100 WBCS
PLATELET # BLD AUTO: 175 THOUSANDS/UL (ref 149–390)
PMV BLD AUTO: 10.6 FL (ref 8.9–12.7)
POTASSIUM SERPL-SCNC: 3.9 MMOL/L (ref 3.5–5.3)
PROT SERPL-MCNC: 4.7 G/DL (ref 6.4–8.4)
RBC # BLD AUTO: 3.49 MILLION/UL (ref 3.88–5.62)
SODIUM SERPL-SCNC: 134 MMOL/L (ref 135–147)
WBC # BLD AUTO: 10.49 THOUSAND/UL (ref 4.31–10.16)

## 2024-10-24 PROCEDURE — 99232 SBSQ HOSP IP/OBS MODERATE 35: CPT

## 2024-10-24 PROCEDURE — 83735 ASSAY OF MAGNESIUM: CPT

## 2024-10-24 PROCEDURE — 82948 REAGENT STRIP/BLOOD GLUCOSE: CPT

## 2024-10-24 PROCEDURE — 80053 COMPREHEN METABOLIC PANEL: CPT

## 2024-10-24 PROCEDURE — 85025 COMPLETE CBC W/AUTO DIFF WBC: CPT

## 2024-10-24 RX ORDER — INSULIN LISPRO 100 [IU]/ML
1-6 INJECTION, SOLUTION INTRAVENOUS; SUBCUTANEOUS
Status: DISCONTINUED | OUTPATIENT
Start: 2024-10-24 | End: 2024-10-26 | Stop reason: HOSPADM

## 2024-10-24 RX ADMIN — AMLODIPINE BESYLATE 5 MG: 5 TABLET ORAL at 08:18

## 2024-10-24 RX ADMIN — INSULIN LISPRO 1 UNITS: 100 INJECTION, SOLUTION INTRAVENOUS; SUBCUTANEOUS at 21:47

## 2024-10-24 RX ADMIN — ENOXAPARIN SODIUM 40 MG: 40 INJECTION SUBCUTANEOUS at 08:18

## 2024-10-24 RX ADMIN — INSULIN ASPART 5 UNITS: 100 INJECTION, SUSPENSION SUBCUTANEOUS at 17:23

## 2024-10-24 RX ADMIN — AMLODIPINE BESYLATE 5 MG: 5 TABLET ORAL at 21:51

## 2024-10-24 RX ADMIN — INSULIN LISPRO 1 UNITS: 100 INJECTION, SOLUTION INTRAVENOUS; SUBCUTANEOUS at 08:18

## 2024-10-24 RX ADMIN — INSULIN LISPRO 1 UNITS: 100 INJECTION, SOLUTION INTRAVENOUS; SUBCUTANEOUS at 12:27

## 2024-10-24 RX ADMIN — HYDRALAZINE HYDROCHLORIDE 10 MG: 20 INJECTION, SOLUTION INTRAMUSCULAR; INTRAVENOUS at 06:40

## 2024-10-24 RX ADMIN — SODIUM CHLORIDE 1000 ML: 0.9 INJECTION, SOLUTION INTRAVENOUS at 12:27

## 2024-10-24 RX ADMIN — FOLIC ACID 1 MG: 5 INJECTION, SOLUTION INTRAMUSCULAR; INTRAVENOUS; SUBCUTANEOUS at 21:51

## 2024-10-24 RX ADMIN — THIAMINE HYDROCHLORIDE 500 MG: 100 INJECTION, SOLUTION INTRAMUSCULAR; INTRAVENOUS at 17:22

## 2024-10-24 RX ADMIN — INSULIN LISPRO 3 UNITS: 100 INJECTION, SOLUTION INTRAVENOUS; SUBCUTANEOUS at 17:23

## 2024-10-24 RX ADMIN — THIAMINE HYDROCHLORIDE 500 MG: 100 INJECTION, SOLUTION INTRAMUSCULAR; INTRAVENOUS at 08:18

## 2024-10-24 RX ADMIN — INSULIN ASPART 5 UNITS: 100 INJECTION, SUSPENSION SUBCUTANEOUS at 08:21

## 2024-10-24 NOTE — ASSESSMENT & PLAN NOTE
In the setting of hyperglycemia vs. Alcohol withdrawal   Patient is alert and oriented to person and place   Continue High Dose thiamine 500mg TID for 8 doses   Continue to monitor blood sugars

## 2024-10-24 NOTE — ASSESSMENT & PLAN NOTE
Patient endorses current daily alcohol use   At this time patient is not endorsing further withdrawal symptoms. VSS denies any tremors, nausea, vomiting, diarrhea, or anxiety  Received a total of 621 mg of phenobarbital during this admission.   Discussed alcohol cessation with patient, contemplating inpatient drug and alcohol rehab upon discharge   CRS consulted  CM assisting with HOST referral

## 2024-10-24 NOTE — ASSESSMENT & PLAN NOTE
Admission glucose 794, pH 7.2  lactic acid elevated at 9.9, no elevation in anion gap   Admitted to the ICU for insulin gtt and fluids   Insulin gtt discontinued on 10/23/24 as blood glucose well controlled, patient transitioned to [facility] level of care on 10/24/24

## 2024-10-24 NOTE — DISCHARGE INSTR - OTHER ORDERS
Carolin Nicholson  Certified     [facility] [place] [facility]  [phone]    [employer] [facility]  [address]  [facility] [address]  [phone]    [facility]  [address] [facility] [address]  [phone]    [facility]   [address] [facility] [address]  [phone]    [facility] [place] in [place], [place]  Address: [address] [place] [address]  Phone: [phone]      Telugu Speaking  Facilities    [email or web address]  [facility]  Learn more about these services  by contacting [phone]    [facility] [facility] (Some Telugu speaking staff)   [address], [place] [address]  Phone: [phone]

## 2024-10-24 NOTE — ASSESSMENT & PLAN NOTE
Evidence of ZENAIDA on admission1 .57 with elevated Cr 1.31   Baseline Cr 1.0-1.1   IVF hydration  Avoid nephrotoxic agents  Continue monitoring renal function

## 2024-10-24 NOTE — PLAN OF CARE
Problem: Potential for Falls  Goal: Patient will remain free of falls  Description: INTERVENTIONS:  - Educate patient/family on patient safety including physical limitations  - Instruct patient to call for assistance with activity   - Consult OT/PT to assist with strengthening/mobility   - Keep Call bell within reach  - Keep bed low and locked with side rails adjusted as appropriate  - Keep care items and personal belongings within reach  - Initiate and maintain comfort rounds  - Make Fall Risk Sign visible to staff  - Apply yellow socks and bracelet for high fall risk patients  - Consider moving patient to room near nurses station  Outcome: Progressing     Problem: PAIN - ADULT  Goal: Verbalizes/displays adequate comfort level or baseline comfort level  Description: Interventions:  - Encourage patient to monitor pain and request assistance  - Assess pain using appropriate pain scale  - Administer analgesics based on type and severity of pain and evaluate response  - Implement non-pharmacological measures as appropriate and evaluate response  - Consider cultural and social influences on pain and pain management  - Notify physician/advanced practitioner if interventions unsuccessful or patient reports new pain  Outcome: Progressing     Problem: INFECTION - ADULT  Goal: Absence or prevention of progression during hospitalization  Description: INTERVENTIONS:  - Assess and monitor for signs and symptoms of infection  - Monitor lab/diagnostic results  - Monitor all insertion sites, i.e. indwelling lines, tubes, and drains  - Monitor endotracheal if appropriate and nasal secretions for changes in amount and color  - [place] appropriate cooling/warming therapies per order  - Administer medications as ordered  - Instruct and encourage patient and family to use good hand hygiene technique  - Identify and instruct in appropriate isolation precautions for identified infection/condition  Outcome: Progressing  Goal: Absence  of fever/infection during neutropenic period  Description: INTERVENTIONS:  - Monitor WBC    Outcome: Progressing     Problem: SAFETY ADULT  Goal: Patient will remain free of falls  Description: INTERVENTIONS:  - Educate patient/family on patient safety including physical limitations  - Instruct patient to call for assistance with activity   - Consult OT/PT to assist with strengthening/mobility   - Keep Call bell within reach  - Keep bed low and locked with side rails adjusted as appropriate  - Keep care items and personal belongings within reach  - Initiate and maintain comfort rounds  - Make Fall Risk Sign visible to staff  - Apply yellow socks and bracelet for high fall risk patients  - Consider moving patient to room near nurses station  Outcome: Progressing  Goal: Maintain or return to baseline ADL function  Description: INTERVENTIONS:  -  Assess patient's ability to carry out ADLs; assess patient's baseline for ADL function and identify physical deficits which impact ability to perform ADLs (bathing, care of mouth/teeth, toileting, grooming, dressing, etc.)  - Assess/evaluate cause of self-care deficits   - Assess range of motion  - Assess patient's mobility; develop plan if impaired  - Assess patient's need for assistive devices and provide as appropriate  - Encourage maximum independence but intervene and supervise when necessary  - Involve family in performance of ADLs  - Assess for home care needs following discharge   - Consider OT consult to assist with ADL evaluation and planning for discharge  - Provide patient education as appropriate  Outcome: Progressing  Goal: Maintains/Returns to pre admission functional level  Description: INTERVENTIONS:  - Perform AM-PAC 6 Click Basic Mobility/ Daily Activity assessment daily.  - Set and communicate daily mobility goal to care team and patient/family/caregiver.   - Collaborate with rehabilitation services on mobility goals if consulted  - Perform Range of Motion  3 times a day.  - Reposition patient every 2 hours.  - Dangle patient 3 times a day  - Stand patient 3 times a day  - Ambulate patient 3 times a day  - Out of bed to chair 3 times a day   - Out of bed for meals 3 times a day  - Out of bed for toileting  - Record patient progress and toleration of activity level   Outcome: Progressing     Problem: DISCHARGE PLANNING  Goal: Discharge to home or other facility with appropriate resources  Description: INTERVENTIONS:  - Identify barriers to discharge w/patient and caregiver  - Arrange for needed discharge resources and transportation as appropriate  - Identify discharge learning needs (meds, wound care, etc.)  - Arrange for interpretive services to assist at discharge as needed  - Refer to Case Management Department for coordinating discharge planning if the patient needs post-hospital services based on physician/advanced practitioner order or complex needs related to functional status, cognitive ability, or social support system  Outcome: Progressing     Problem: Knowledge Deficit  Goal: Patient/family/caregiver demonstrates understanding of disease process, treatment plan, medications, and discharge instructions  Description: Complete learning assessment and assess knowledge base.  Interventions:  - Provide teaching at level of understanding  - Provide teaching via preferred learning methods  Outcome: Progressing     Problem: NEUROSENSORY - ADULT  Goal: Achieves stable or improved neurological status  Description: INTERVENTIONS  - Monitor and report changes in neurological status  - Monitor vital signs such as temperature, blood pressure, glucose, and any other labs ordered   - Initiate measures to prevent increased intracranial pressure  - Monitor for seizure activity and implement precautions if appropriate      Outcome: Progressing  Goal: Remains free of injury related to seizures activity  Description: INTERVENTIONS  - Maintain airway, patient safety  and  administer oxygen as ordered  - Monitor patient for seizure activity, document and report duration and description of seizure to physician/advanced practitioner  - If seizure occurs,  ensure patient safety during seizure  - Reorient patient post seizure  - Seizure pads on all 4 side rails  - Instruct patient/family to notify RN of any seizure activity including if an aura is experienced  - Instruct patient/family to call for assistance with activity based on nursing assessment  - Administer anti-seizure medications if ordered    Outcome: Progressing  Goal: Achieves maximal functionality and self care  Description: INTERVENTIONS  - Monitor swallowing and airway patency with patient fatigue and changes in neurological status  - Encourage and assist patient to increase activity and self care.   - Encourage visually impaired, hearing impaired and aphasic patients to use assistive/communication devices  Outcome: Progressing     Problem: CARDIOVASCULAR - ADULT  Goal: Maintains optimal cardiac output and hemodynamic stability  Description: INTERVENTIONS:  - Monitor I/O, vital signs and rhythm  - Monitor for S/S and trends of decreased cardiac output  - Administer and titrate ordered vasoactive medications to optimize hemodynamic stability  - Assess quality of pulses, skin color and temperature  - Assess for signs of decreased coronary artery perfusion  - Instruct patient to report change in severity of symptoms  Outcome: Progressing  Goal: Absence of cardiac dysrhythmias or at baseline rhythm  Description: INTERVENTIONS:  - Continuous cardiac monitoring, vital signs, obtain 12 lead EKG if ordered  - Administer antiarrhythmic and heart rate control medications as ordered  - Monitor electrolytes and administer replacement therapy as ordered  Outcome: Progressing     Problem: GASTROINTESTINAL - ADULT  Goal: Minimal or absence of nausea and/or vomiting  Description: INTERVENTIONS:  - Administer IV fluids if ordered to ensure  adequate hydration  - Maintain NPO status until nausea and vomiting are resolved  - Nasogastric tube if ordered  - Administer ordered antiemetic medications as needed  - Provide nonpharmacologic comfort measures as appropriate  - Advance diet as tolerated, if ordered  - Consider nutrition services referral to assist patient with adequate nutrition and appropriate food choices  Outcome: Progressing  Goal: Maintains or returns to baseline bowel function  Description: INTERVENTIONS:  - Assess bowel function  - Encourage oral fluids to ensure adequate hydration  - Administer IV fluids if ordered to ensure adequate hydration  - Administer ordered medications as needed  - Encourage mobilization and activity  - Consider nutritional services referral to assist patient with adequate nutrition and appropriate food choices  Outcome: Progressing  Goal: Maintains adequate nutritional intake  Description: INTERVENTIONS:  - Monitor percentage of each meal consumed  - Identify factors contributing to decreased intake, treat as appropriate  - Assist with meals as needed  - Monitor I&O, weight, and lab values if indicated  - Obtain nutrition services referral as needed  Outcome: Progressing  Goal: Oral mucous membranes remain intact  Description: INTERVENTIONS  - Assess oral mucosa and hygiene practices  - Implement preventative oral hygiene regimen  - Implement oral medicated treatments as ordered  - Initiate Nutrition services referral as needed  Outcome: Progressing     Problem: GENITOURINARY - ADULT  Goal: Maintains or returns to baseline urinary function  Description: INTERVENTIONS:  - Assess urinary function  - Encourage oral fluids to ensure adequate hydration if ordered  - Administer IV fluids as ordered to ensure adequate hydration  - Administer ordered medications as needed  - Offer frequent toileting  - Follow urinary retention protocol if ordered  Outcome: Progressing  Goal: Absence of urinary retention  Description:  INTERVENTIONS:  - Assess patient’s ability to void and empty bladder  - Monitor I/O  - Bladder scan as needed  - Discuss with physician/AP medications to alleviate retention as needed  - Discuss catheterization for long term situations as appropriate  Outcome: Progressing     Problem: METABOLIC, FLUID AND ELECTROLYTES - ADULT  Goal: Electrolytes maintained within normal limits  Description: INTERVENTIONS:  - Monitor labs and assess patient for signs and symptoms of electrolyte imbalances  - Administer electrolyte replacement as ordered  - Monitor response to electrolyte replacements, including repeat lab results as appropriate  - Instruct patient on fluid and nutrition as appropriate  Outcome: Progressing  Goal: Fluid balance maintained  Description: INTERVENTIONS:  - Monitor labs   - Monitor I/O and WT  - Instruct patient on fluid and nutrition as appropriate  - Assess for signs & symptoms of volume excess or deficit  Outcome: Progressing  Goal: Glucose maintained within target range  Description: INTERVENTIONS:  - Monitor Blood Glucose as ordered  - Assess for signs and symptoms of hyperglycemia and hypoglycemia  - Administer ordered medications to maintain glucose within target range  - Assess nutritional intake and initiate nutrition service referral as needed  Outcome: Progressing        Problem: HEMATOLOGIC - ADULT  Goal: Maintains hematologic stability  Description: INTERVENTIONS  - Assess for signs and symptoms of bleeding or hemorrhage  - Monitor labs  - Administer supportive blood products/factors as ordered and appropriate  Outcome: Progressing     Problem: MUSCULOSKELETAL - ADULT  Goal: Maintain or return mobility to safest level of function  Description: INTERVENTIONS:  - Assess patient's ability to carry out ADLs; assess patient's baseline for ADL function and identify physical deficits which impact ability to perform ADLs (bathing, care of mouth/teeth, toileting, grooming, dressing, etc.)  -  Assess/evaluate cause of self-care deficits   - Assess range of motion  - Assess patient's mobility  - Assess patient's need for assistive devices and provide as appropriate  - Encourage maximum independence but intervene and supervise when necessary  - Involve family in performance of ADLs  - Assess for home care needs following discharge   - Consider OT consult to assist with ADL evaluation and planning for discharge  - Provide patient education as appropriate  Outcome: Progressing  Goal: Maintain proper alignment of affected body part  Description: INTERVENTIONS:  - Support, maintain and protect limb and body alignment  - Provide patient/ family with appropriate education  Outcome: Progressing     Problem: Prexisting or High Potential for Compromised Skin Integrity  Goal: Skin integrity is maintained or improved  Description: INTERVENTIONS:  - Identify patients at risk for skin breakdown  - Assess and monitor skin integrity  - Assess and monitor nutrition and hydration status  - Monitor labs   - Assess for incontinence   - Turn and reposition patient  - Assist with mobility/ambulation  - Relieve pressure over bony prominences  - Avoid friction and shearing  - Provide appropriate hygiene as needed including keeping skin clean and dry  - Evaluate need for skin moisturizer/barrier cream  - Collaborate with interdisciplinary team   - Patient/family teaching  - Consider wound care consult   Outcome: Progressing    `

## 2024-10-24 NOTE — CASE MANAGEMENT
Case Management Assessment & Discharge Planning Note    Patient name Wes Araujo  Location [place] [address] /[place] 2 M* MRN [id number]  : 1970 Date 10/24/2024       Current Admission Date: 10/22/2024  Current Admission Diagnosis:Acute encephalopathy   Patient Active Problem List    Diagnosis Date Noted Date Diagnosed    Hyperglycemia without ketosis 10/22/2024     Hypertensive urgency 10/22/2024     Transaminitis 2024     Alcohol-induced chronic pancreatitis (HCC) 2024     Acute low back pain 03/10/2024     SIRS (systemic inflammatory response syndrome) (HCC) 2024     Hyperosmolar hyperglycemic state (HHS) ([facility]) 2024     Alcohol abuse with history of withdrawal ([facility]) 2024     Elevated brain natriuretic peptide (BNP) level 2024     Type 2 diabetes mellitus ([facility]) 2024     Chronic low back pain 2023     Alcohol withdrawal ([facility]) 2023     Diarrhea 2023     Seizure ([facility]) 2022     Illicit drug use 2022     Leukocytosis 2022     Hypothermia 2022     Cocaine use 2022     Abnormal chest x-ray 2022     Bilateral hearing loss 2020     Hypoglycemia 2020     Hypokalemia 2020     Acute renal failure superimposed on stage 3 chronic kidney disease (HCC) 2019     Anemia 2019     Hyponatremia 2019     History of atrial fibrillation 2019     Acute encephalopathy 2019     ZENAIDA (acute kidney injury) ([facility]) 2019     Alcohol intoxication in active alcoholic with complication (HCC) 2016     Primary hypertension      Type 2 diabetes mellitus with hypoglycemia, with long-term current use of insulin (HCC)      Uncomplicated alcohol dependence (HCC)      Paroxysmal A-fib (HCC)      Chronic pancreatitis (HCC)      Thrombocytopenia (HCC)        LOS (days): 2  Geometric Mean LOS (GMLOS) (days):   Days to GMLOS:     OBJECTIVE:    Risk of Unplanned Readmission Score: 24.72          Current admission status: Inpatient       Preferred Pharmacy:    [facility] [place] [facility] - [address]  [address] [address]  Phone: [phone] Fax: [phone]    [facility] [place] - [place] - [address],  [address],  First [address] [facility] [address]  Phone: [phone] Fax: [phone]    [id number] - [place] - [address]  [address] [address]  Phone: [phone] Fax: [phone]    Primary Care Provider: Rush Kebede MD    Primary Insurance: [employer]  Secondary Insurance:     ASSESSMENT:  Active Health Care Proxies       [facility] Representative - Father   Primary Phone: [phone] (Mobile)  Home Phone: [phone]                           Readmission Root Cause  30 Day Readmission: No    Patient Information  Admitted from:: Home  Mental Status: Alert  During Assessment patient was accompanied by: Parent  Assessment information provided by:: Patient, Parent  Primary Caregiver: Self  Support Systems: Self, Parent  County of Residence: [place]  [place] [facility] do you live in?: [place]  Type of Current Residence: Apartment  Floor Level: 2  Living Arrangements: Lives w/ Parent(s)  Is patient a ?: No    Activities of Daily Living Prior to Admission  Functional Status: Independent  Completes ADLs independently?: Yes  Ambulates independently?: Yes  Does patient use assisted devices?: No  Does patient currently own DME?: No  Does patient have a history of Outpatient Therapy (PT/OT)?: No  Does the patient have a history of Short-Term Rehab?: No  Does patient have a history of HHC?: No  Does patient currently have HHC?: No         Patient Information Continued  Income Source: Unemployed  Does patient have prescription coverage?: Yes  Does patient receive dialysis treatments?: No  Does patient have a history of substance abuse?: Yes  Historical  substance use preference: Alcohol/ETOH  Is patient currently in treatment for substance abuse?: Patient provided treatment options.  Does patient have a history of Mental Health Diagnosis?: No         Means of Transportation  Means of Transport to Appts:: Family transport          DISCHARGE DETAILS:    Discharge planning discussed with:: patient, father at bedside  Freedom of Choice: Yes     CM contacted family/caregiver?: Yes  Were Treatment Team discharge recommendations reviewed with patient/caregiver?: Yes  Did patient/caregiver verbalize understanding of patient care needs?: Yes  Were patient/caregiver advised of the risks associated with not following Treatment Team discharge recommendations?: Yes    Contacts  Patient Contacts: Wes Araujo  Relationship to Patient:: Family  Contact Method: Phone  Phone Number: [phone]  Reason/Outcome: Emergency Contact              Other Referral/Resources/Interventions Provided:  Interventions: Substance Abuse Treatment         Treatment Team Recommendation: Substance Abuse Treatment  Discharge Destination Plan:: Substance Abuse Treatment  Transport at Discharge : Other (Comment) (TBD)                                      Additional Comments: Met with patient and father at bedside, utilizing [employer] IPAD .  Patient resides with his parents, is independent at baseline.  Discussed alcohol use, CM offered HOST evaluation for substance abuse treatment options.  Patient initially hesitant, after discussion with father and CM, agreed to HOST referral.  Referral placed and required clinical information faxed.  CM to follow.

## 2024-10-24 NOTE — ASSESSMENT & PLAN NOTE
Lab Results   Component Value Date    HGBA1C 11.2 (H) 10/23/2024       Recent Labs     10/23/24  2040 10/23/24  2346 10/24/24  0723 10/24/24  1130   POCGLU 139 202* 162* 155*       Blood Sugar Average: Last 72 hrs:  (P) [id number]    Patient with a history of DM2 with hyperglycemia   Home medications NovoLOG mix 70/30 -inject 5 units BID with meals   Patient has a history of non-compliance with insulin administration   Hgb A1C 11.2   Resumed patient's home regimen in addition to SSI coverage   Adjust regimen as indicated   Accuchecks AC/HS  Carb-controlled diet  Hypoglycemia protocol   Due to multiple episodes of hyperglycemia would recommend outpatient endocrinology follow up and opthalmologic outpatient follow up

## 2024-10-24 NOTE — PROGRESS NOTES
Progress Note - Hospitalist   Name: Wes Araujo 54 y.o. male I MRN: [id number]  Unit/Bed#: [place] [address] -01 I Date of Admission: 10/22/2024   Date of Service: 10/24/2024 I Hospital Day: 2    Assessment & Plan  Acute encephalopathy  In the setting of hyperglycemia vs. Alcohol withdrawal   Patient is alert and oriented to person and place   Continue High Dose thiamine 500mg TID for 8 doses   Continue to monitor blood sugars   Seizure (HCC)  Presented to the hospital on 10/22/24 with witnessed tonic clonic seizures. Etiology of seizures metabolic process vs. Alcohol withdrawal as patient reported last drink was 2 days prior to admission.   Patient received phenobarbital load of 621 mg during current hospitalization   Seizure precautions   Alcohol withdrawal (HCC)  Patient endorses current daily alcohol use   At this time patient is not endorsing further withdrawal symptoms. VSS denies any tremors, nausea, vomiting, diarrhea, or anxiety  Received a total of 621 mg of phenobarbital during this admission.   Discussed alcohol cessation with patient, contemplating inpatient drug and alcohol rehab upon discharge   CRS consulted  CM assisting with HOST referral   Type 2 diabetes mellitus (HCC)  Lab Results   Component Value Date    HGBA1C 11.2 (H) 10/23/2024       Recent Labs     10/23/24  2040 10/23/24  2346 10/24/24  0723 10/24/24  1130   POCGLU 139 202* 162* 155*       Blood Sugar Average: Last 72 hrs:  (P) [id number]    Patient with a history of DM2 with hyperglycemia   Home medications NovoLOG mix 70/30 -inject 5 units BID with meals   Patient has a history of non-compliance with insulin administration   Hgb A1C 11.2   Resumed patient's home regimen in addition to SSI coverage   Adjust regimen as indicated   Accuchecks AC/HS  Carb-controlled diet  Hypoglycemia protocol   Due to multiple episodes of hyperglycemia would recommend outpatient endocrinology follow up and opthalmologic outpatient follow  up  Hyperglycemia without ketosis  Admission glucose 794, pH 7.2  lactic acid elevated at 9.9, no elevation in anion gap   Admitted to the ICU for insulin gtt and fluids   Insulin gtt discontinued on 10/23/24 as blood glucose well controlled, patient transitioned to [facility] level of care on 10/24/24     Hypertensive urgency    ZENAIDA (acute kidney injury) (HCC)  Evidence of ZENAIDA on admission1 .57 with elevated Cr 1.31   Baseline Cr 1.0-1.1   IVF hydration  Avoid nephrotoxic agents  Continue monitoring renal function     VTE Pharmacologic Prophylaxis:   Moderate Risk (Score 3-4) - Pharmacological DVT Prophylaxis Ordered: heparin.    Mobility:   Basic Mobility Inpatient Raw Score: 15  -[facility] Goal: 4: Move to chair/commode  -HLM Achieved: 1: Laying in bed  -HLM Goal NOT achieved. Continue with multidisciplinary rounding and encourage appropriate mobility to improve upon -[facility] goals.    Patient Centered Rounds: I performed bedside rounds with nursing staff today.   Discussions with Specialists or Other Care Team Provider: Case Management     Education and Discussions with Family / Patient: Attempted to update  (father) via phone. Left voicemail.     Current Length of Stay: 2 day(s)  Current Patient Status: Inpatient   Certification Statement: The patient will continue to require additional inpatient hospital stay due to IV fluids, electrolyte monitoring, alcohol withdrawal   Discharge Plan: Anticipate discharge in 24-48 hrs to home.    Code Status: Level 1 - Full Code    Subjective   Patient seen at bedside. [employer] interpretation utilized. Patient is alert and oriented this morning. He is denying further withdrawal symptoms. Does admit to not being compliant with insulin at home. Denies any chest pain, shortness of breath or abdominal pain.     Objective :  Temp:  [97.7 °F (36.5 °C)] 97.7 °F (36.5 °C)  HR:  [72-94] 89  BP: (125-170)/(65-96) 170/89  Resp:  [14-19] 16  SpO2:  [97 %-99 %] 97 %  O2 Device:  None (Room air)    Body mass index is 19.47 kg/m².     Input and Output Summary (last 24 hours):     Intake/Output Summary (Last 24 hours) at 10/24/2024 1240  Last data filed at 10/24/2024 0919  Gross per 24 hour   Intake 300 ml   Output 1050 ml   Net -750 ml       Physical Exam  Vitals reviewed.   Constitutional:       General: He is not in acute distress.     Appearance: He is not diaphoretic.   Cardiovascular:      Rate and Rhythm: Normal rate and regular rhythm.      Heart sounds: No murmur heard.  Pulmonary:      Effort: No respiratory distress.      Breath sounds: Normal breath sounds.   Abdominal:      General: Bowel sounds are normal. There is no distension.      Palpations: Abdomen is soft.      Tenderness: There is no abdominal tenderness.   Neurological:      Mental Status: He is alert and oriented to person, place, and time.      Motor: No tremor.      Coordination: Coordination is intact.   Psychiatric:         Mood and Affect: Mood is not anxious.           Lines/Drains:  Lines/Drains/Airways       Active Status       Name Placement date Placement time Site Days    External Urinary Catheter 10/22/24  --  -- 2                            Lab Results: I have reviewed the following results:   Results from last 7 days   Lab Units 10/24/24  0828   WBC Thousand/uL 10.49*   HEMOGLOBIN g/dL 10.9*   HEMATOCRIT % 29.6*   PLATELETS Thousands/uL 175   SEGS PCT % 70   LYMPHO PCT % 22   MONO PCT % 6   EOS PCT % 2     Results from last 7 days   Lab Units 10/24/24  0828   SODIUM mmol/L 134*   POTASSIUM mmol/L 3.9   CHLORIDE mmol/L 103   CO2 mmol/L 28   BUN mg/dL 8   CREATININE mg/dL 1.31*   ANION GAP mmol/L 3*   CALCIUM mg/dL 7.7*   ALBUMIN g/dL 2.3*   TOTAL BILIRUBIN mg/dL 0.70   ALK PHOS U/L 79   ALT U/L 16   AST U/L 15   GLUCOSE RANDOM mg/dL 183*         Results from last 7 days   Lab Units 10/24/24  1130 10/24/24  0723 10/23/24  2346 10/23/24  2040 10/23/24  1642 10/23/24  1335 10/23/24  1215 10/23/24  1157  10/23/24  1142 10/23/24  1117 10/23/24  0610 10/23/24  0451   POC GLUCOSE mg/dl 155* 162* 202* 139 227* 164* 88 68 65 57* 231* 219*     Results from last 7 days   Lab Units 10/23/24  0451   HEMOGLOBIN A1C % 11.2*     Results from last 7 days   Lab Units 10/22/24  1712 10/22/24  1507   LACTIC ACID mmol/L 3.1* 9.9*       Recent Cultures (last 7 days):         Imaging Results Review: No pertinent imaging studies reviewed.  Other Study Results Review: No additional pertinent studies reviewed.    Last 24 Hours Medication List:     Current Facility-Administered Medications:     amLODIPine (NORVASC) tablet 5 mg, BID    enoxaparin (LOVENOX) subcutaneous injection 40 mg, Q24H JAISON    folic acid 1 mg in sodium chloride 0.9 % 50 mL IVPB, Q24H, Last Rate: 1 mg (10/23/24 2139)    hydrALAZINE (APRESOLINE) injection 10 mg, Q4H PRN    insulin aspart protamine-insulin aspart (NovoLOG 70/30) 100 units/mL subcutaneous injection 5 Units, BID AC    insulin lispro (HumALOG/ADMELOG) 100 units/mL subcutaneous injection 1-6 Units, 4x Daily (AC & HS) **AND** Fingerstick Glucose (POCT), 4x Daily AC and at bedtime    labetalol (NORMODYNE) injection 10 mg, Q4H PRN    sodium chloride 0.9 % bolus 1,000 mL, Once, Last Rate: 1,000 mL (10/24/24 1227)    thiamine (VITAMIN B1) 500 mg in sodium chloride 0.9 % 50 mL IVPB, TID, Last Rate: 500 mg (10/24/24 0818) **FOLLOWED BY** [START ON 10/25/2024] thiamine (VITAMIN B1) 250 mg in sodium chloride 0.9 % 50 mL IVPB, Daily    Administrative Statements   Today, Patient Was Seen By: Mabel Soliman PA-C      **Please Note: This note may have been constructed using a voice recognition system.**

## 2024-10-24 NOTE — CERTIFIED RECOVERY SPECIALIST
"   Certified  Note    Patient name: Wes Araujo  Location: South  /South 2 M*  [place]: [facility]  Attending:  Brooke Paige MD MRN [id number]  : 1970  Age: 54 y.o.    Sex: male Date 10/24/2024         Substance Use History:     Social History     Substance and Sexual Activity   Alcohol Use Yes        Social History     Substance and Sexual Activity   Drug Use Yes    Types: Cocaine     Time spent: 22 minutes    CRS met with patient (W/IPAD  Mariella) to offer support and explain services available. Patient had agreed to HOST referral for treatment earlier, but through  this CRS learned he \"just wants to go home to be with my family and give them what I couldn't before\" Patient was provided with CRS business card and resources in patient instructions so he will have them in his discharge paperwork.    CRS continues to be available for support if needed.                '          Carolin Nicholson       "

## 2024-10-24 NOTE — CERTIFIED RECOVERY SPECIALIST
Certified  Note    Patient name: Wes Araujo  Location: South 2 /South 2 M*  [place]: [facility]  Attending:  Brooke Paige MD MRN [id number]  : 1970  Age: 54 y.o.    Sex: male Date 10/24/2024         Substance Use History:     Social History     Substance and Sexual Activity   Alcohol Use Yes        Social History     Substance and Sexual Activity   Drug Use Yes    Types: Cocaine     CRS messaged CM to advise CRS is in [place] expecting to be at [facility] by 11:30am and if needed sooner to support patient to please send Epic Chat.               '          Carolin Nicholson

## 2024-10-24 NOTE — PLAN OF CARE
Problem: Potential for Falls  Goal: Patient will remain free of falls  Description: INTERVENTIONS:  - Educate patient/family on patient safety including physical limitations  - Instruct patient to call for assistance with activity   - Consult OT/PT to assist with strengthening/mobility   - Keep Call bell within reach  - Keep bed low and locked with side rails adjusted as appropriate  - Keep care items and personal belongings within reach  - Initiate and maintain comfort rounds  - Make Fall Risk Sign visible to staff  - Offer Toileting every 2 Hours, in advance of need  - Initiate/Maintain bed alarm  - Obtain necessary fall risk management equipment.  - Apply yellow socks and bracelet for high fall risk patients  - Consider moving patient to room near nurses station  Outcome: Progressing     Problem: PAIN - ADULT  Goal: Verbalizes/displays adequate comfort level or baseline comfort level  Description: Interventions:  - Encourage patient to monitor pain and request assistance  - Assess pain using appropriate pain scale  - Administer analgesics based on type and severity of pain and evaluate response  - Implement non-pharmacological measures as appropriate and evaluate response  - Consider cultural and social influences on pain and pain management  - Notify physician/advanced practitioner if interventions unsuccessful or patient reports new pain  Outcome: Progressing     Problem: INFECTION - ADULT  Goal: Absence or prevention of progression during hospitalization  Description: INTERVENTIONS:  - Assess and monitor for signs and symptoms of infection  - Monitor lab/diagnostic results  - Monitor all insertion sites, i.e. indwelling lines, tubes, and drains  - Monitor endotracheal if appropriate and nasal secretions for changes in amount and color  - [place] appropriate cooling/warming therapies per order  - Administer medications as ordered  - Instruct and encourage patient and family to use good hand hygiene  technique  - Identify and instruct in appropriate isolation precautions for identified infection/condition  Outcome: Progressing  Goal: Absence of fever/infection during neutropenic period  Description: INTERVENTIONS:  - Monitor WBC    Outcome: Progressing     Problem: SAFETY ADULT  Goal: Patient will remain free of falls  Description: INTERVENTIONS:  - Educate patient/family on patient safety including physical limitations  - Instruct patient to call for assistance with activity   - Consult OT/PT to assist with strengthening/mobility   - Keep Call bell within reach  - Keep bed low and locked with side rails adjusted as appropriate  - Keep care items and personal belongings within reach  - Initiate and maintain comfort rounds  - Make Fall Risk Sign visible to staff  - Offer Toileting every 2 Hours, in advance of need  - Initiate/Maintain bed alarm  - Obtain necessary fall risk management equipment.  - Apply yellow socks and bracelet for high fall risk patients  - Consider moving patient to room near nurses station  Outcome: Progressing  Goal: Maintain or return to baseline ADL function  Description: INTERVENTIONS:  -  Assess patient's ability to carry out ADLs; assess patient's baseline for ADL function and identify physical deficits which impact ability to perform ADLs (bathing, care of mouth/teeth, toileting, grooming, dressing, etc.)  - Assess/evaluate cause of self-care deficits   - Assess range of motion  - Assess patient's mobility; develop plan if impaired  - Assess patient's need for assistive devices and provide as appropriate  - Encourage maximum independence but intervene and supervise when necessary  - Involve family in performance of ADLs  - Assess for home care needs following discharge   - Consider OT consult to assist with ADL evaluation and planning for discharge  - Provide patient education as appropriate  Outcome: Progressing  Goal: Maintains/Returns to pre admission functional level  Description:  INTERVENTIONS:  - Perform AM-PAC 6 Click Basic Mobility/ Daily Activity assessment daily.  - Set and communicate daily mobility goal to care team and patient/family/caregiver.   - Collaborate with rehabilitation services on mobility goals if consulted  - Perform Range of Motion 2 times a day.  - Reposition patient every 2 hours.  - Dangle patient 2 times a day  - Stand patient 2 times a day  - Ambulate patient 2 times a day  - Out of bed to chair 2 times a day   - Out of bed for meals 2 times a day  - Out of bed for toileting  - Record patient progress and toleration of activity level   Outcome: Progressing     Problem: DISCHARGE PLANNING  Goal: Discharge to home or other facility with appropriate resources  Description: INTERVENTIONS:  - Identify barriers to discharge w/patient and caregiver  - Arrange for needed discharge resources and transportation as appropriate  - Identify discharge learning needs (meds, wound care, etc.)  - Arrange for interpretive services to assist at discharge as needed  - Refer to Case Management Department for coordinating discharge planning if the patient needs post-hospital services based on physician/advanced practitioner order or complex needs related to functional status, cognitive ability, or social support system  Outcome: Progressing     Problem: Knowledge Deficit  Goal: Patient/family/caregiver demonstrates understanding of disease process, treatment plan, medications, and discharge instructions  Description: Complete learning assessment and assess knowledge base.  Interventions:  - Provide teaching at level of understanding  - Provide teaching via preferred learning methods  Outcome: Progressing     Problem: NEUROSENSORY - ADULT  Goal: Achieves stable or improved neurological status  Description: INTERVENTIONS  - Monitor and report changes in neurological status  - Monitor vital signs such as temperature, blood pressure, glucose, and any other labs ordered   - Initiate measures  to prevent increased intracranial pressure  - Monitor for seizure activity and implement precautions if appropriate      Outcome: Progressing  Goal: Remains free of injury related to seizures activity  Description: INTERVENTIONS  - Maintain airway, patient safety  and administer oxygen as ordered  - Monitor patient for seizure activity, document and report duration and description of seizure to physician/advanced practitioner  - If seizure occurs,  ensure patient safety during seizure  - Reorient patient post seizure  - Seizure pads on all 4 side rails  - Instruct patient/family to notify RN of any seizure activity including if an aura is experienced  - Instruct patient/family to call for assistance with activity based on nursing assessment  - Administer anti-seizure medications if ordered    Outcome: Progressing  Goal: Achieves maximal functionality and self care  Description: INTERVENTIONS  - Monitor swallowing and airway patency with patient fatigue and changes in neurological status  - Encourage and assist patient to increase activity and self care.   - Encourage visually impaired, hearing impaired and aphasic patients to use assistive/communication devices  Outcome: Progressing     Problem: CARDIOVASCULAR - ADULT  Goal: Maintains optimal cardiac output and hemodynamic stability  Description: INTERVENTIONS:  - Monitor I/O, vital signs and rhythm  - Monitor for S/S and trends of decreased cardiac output  - Administer and titrate ordered vasoactive medications to optimize hemodynamic stability  - Assess quality of pulses, skin color and temperature  - Assess for signs of decreased coronary artery perfusion  - Instruct patient to report change in severity of symptoms  Outcome: Progressing  Goal: Absence of cardiac dysrhythmias or at baseline rhythm  Description: INTERVENTIONS:  - Continuous cardiac monitoring, vital signs, obtain 12 lead EKG if ordered  - Administer antiarrhythmic and heart rate control medications  as ordered  - Monitor electrolytes and administer replacement therapy as ordered  Outcome: Progressing     Problem: GASTROINTESTINAL - ADULT  Goal: Minimal or absence of nausea and/or vomiting  Description: INTERVENTIONS:  - Administer IV fluids if ordered to ensure adequate hydration  - Maintain NPO status until nausea and vomiting are resolved  - Nasogastric tube if ordered  - Administer ordered antiemetic medications as needed  - Provide nonpharmacologic comfort measures as appropriate  - Advance diet as tolerated, if ordered  - Consider nutrition services referral to assist patient with adequate nutrition and appropriate food choices  Outcome: Progressing  Goal: Maintains or returns to baseline bowel function  Description: INTERVENTIONS:  - Assess bowel function  - Encourage oral fluids to ensure adequate hydration  - Administer IV fluids if ordered to ensure adequate hydration  - Administer ordered medications as needed  - Encourage mobilization and activity  - Consider nutritional services referral to assist patient with adequate nutrition and appropriate food choices  Outcome: Progressing  Goal: Maintains adequate nutritional intake  Description: INTERVENTIONS:  - Monitor percentage of each meal consumed  - Identify factors contributing to decreased intake, treat as appropriate  - Assist with meals as needed  - Monitor I&O, weight, and lab values if indicated  - Obtain nutrition services referral as needed  Outcome: Progressing  Goal: Oral mucous membranes remain intact  Description: INTERVENTIONS  - Assess oral mucosa and hygiene practices  - Implement preventative oral hygiene regimen  - Implement oral medicated treatments as ordered  - Initiate Nutrition services referral as needed  Outcome: Progressing     Problem: GENITOURINARY - ADULT  Goal: Maintains or returns to baseline urinary function  Description: INTERVENTIONS:  - Assess urinary function  - Encourage oral fluids to ensure adequate hydration if  ordered  - Administer IV fluids as ordered to ensure adequate hydration  - Administer ordered medications as needed  - Offer frequent toileting  - Follow urinary retention protocol if ordered  Outcome: Progressing  Goal: Absence of urinary retention  Description: INTERVENTIONS:  - Assess patient’s ability to void and empty bladder  - Monitor I/O  - Bladder scan as needed  - Discuss with physician/AP medications to alleviate retention as needed  - Discuss catheterization for long term situations as appropriate  Outcome: Progressing     Problem: METABOLIC, FLUID AND ELECTROLYTES - ADULT  Goal: Electrolytes maintained within normal limits  Description: INTERVENTIONS:  - Monitor labs and assess patient for signs and symptoms of electrolyte imbalances  - Administer electrolyte replacement as ordered  - Monitor response to electrolyte replacements, including repeat lab results as appropriate  - Instruct patient on fluid and nutrition as appropriate  Outcome: Progressing  Goal: Fluid balance maintained  Description: INTERVENTIONS:  - Monitor labs   - Monitor I/O and WT  - Instruct patient on fluid and nutrition as appropriate  - Assess for signs & symptoms of volume excess or deficit  Outcome: Progressing  Goal: Glucose maintained within target range  Description: INTERVENTIONS:  - Monitor Blood Glucose as ordered  - Assess for signs and symptoms of hyperglycemia and hypoglycemia  - Administer ordered medications to maintain glucose within target range  - Assess nutritional intake and initiate nutrition service referral as needed  Outcome: Progressing     Problem: SKIN/TISSUE INTEGRITY - ADULT  Goal: Skin Integrity remains intact(Skin Breakdown Prevention)  Description: Assess:  -Perform Jose assessment.  -Clean and moisturize skin.  -Inspect skin when repositioning, toileting, and assisting with ADLS  -Assess extremities for adequate circulation and sensation     Bed Management:  -Have minimal linens on bed & keep  smooth, unwrinkled  -Change linens as needed when moist or perspiring  -Avoid sitting or lying in one position for more than 2 hours while in bed  -Keep HOB at 30 degrees     Toileting:  -Offer bedside commode  -Assess for incontinence.  -Use incontinent care products after each incontinent episode.    Activity:  -Mobilize patient 2 times a day  -Encourage activity and walks on unit  -Encourage or provide ROM exercises   -Turn and reposition patient every 2 Hours  -Use appropriate equipment to lift or move patient in bed  -Instruct/ Assist with weight shifting every 2 when out of bed in chair  -Consider limitation of chair time 2 hour intervals    Skin Care:  -Avoid use of baby powder, tape, friction and shearing, hot water or constrictive clothing  -Relieve pressure over bony prominences,  -Do not massage red bony areas    Next Steps:  -Teach patient strategies to minimize risks,  -Consider consults to  interdisciplinary teams.  Outcome: Progressing     Problem: HEMATOLOGIC - ADULT  Goal: Maintains hematologic stability  Description: INTERVENTIONS  - Assess for signs and symptoms of bleeding or hemorrhage  - Monitor labs  - Administer supportive blood products/factors as ordered and appropriate  Outcome: Progressing     Problem: MUSCULOSKELETAL - ADULT  Goal: Maintain or return mobility to safest level of function  Description: INTERVENTIONS:  - Assess patient's ability to carry out ADLs; assess patient's baseline for ADL function and identify physical deficits which impact ability to perform ADLs (bathing, care of mouth/teeth, toileting, grooming, dressing, etc.)  - Assess/evaluate cause of self-care deficits   - Assess range of motion  - Assess patient's mobility  - Assess patient's need for assistive devices and provide as appropriate  - Encourage maximum independence but intervene and supervise when necessary  - Involve family in performance of ADLs  - Assess for home care needs following discharge   - Consider  OT consult to assist with ADL evaluation and planning for discharge  - Provide patient education as appropriate  Outcome: Progressing  Goal: Maintain proper alignment of affected body part  Description: INTERVENTIONS:  - Support, maintain and protect limb and body alignment  - Provide patient/ family with appropriate education  Outcome: Progressing     Problem: Prexisting or High Potential for Compromised Skin Integrity  Goal: Skin integrity is maintained or improved  Description: INTERVENTIONS:  - Identify patients at risk for skin breakdown  - Assess and monitor skin integrity  - Assess and monitor nutrition and hydration status  - Monitor labs   - Assess for incontinence   - Turn and reposition patient  - Assist with mobility/ambulation  - Relieve pressure over bony prominences  - Avoid friction and shearing  - Provide appropriate hygiene as needed including keeping skin clean and dry  - Evaluate need for skin moisturizer/barrier cream  - Collaborate with interdisciplinary team   - Patient/family teaching  - Consider wound care consult   Outcome: Progressing

## 2024-10-24 NOTE — ASSESSMENT & PLAN NOTE
Presented to the hospital on 10/22/24 with witnessed tonic clonic seizures. Etiology of seizures metabolic process vs. Alcohol withdrawal as patient reported last drink was 2 days prior to admission.   Patient received phenobarbital load of 621 mg during current hospitalization   Seizure precautions

## 2024-10-25 ENCOUNTER — APPOINTMENT (INPATIENT)
Dept: CT IMAGING | Facility: HOSPITAL | Age: 54
DRG: 053 | End: 2024-10-25
Payer: COMMERCIAL

## 2024-10-25 LAB
ALBUMIN SERPL BCG-MCNC: 2.4 G/DL (ref 3.5–5)
ALP SERPL-CCNC: 68 U/L (ref 34–104)
ALT SERPL W P-5'-P-CCNC: 15 U/L (ref 7–52)
ANION GAP SERPL CALCULATED.3IONS-SCNC: 3 MMOL/L (ref 4–13)
AST SERPL W P-5'-P-CCNC: 14 U/L (ref 13–39)
BILIRUB SERPL-MCNC: 0.9 MG/DL (ref 0.2–1)
BUN SERPL-MCNC: 11 MG/DL (ref 5–25)
CALCIUM ALBUM COR SERPL-MCNC: 8.9 MG/DL (ref 8.3–10.1)
CALCIUM SERPL-MCNC: 7.6 MG/DL (ref 8.4–10.2)
CHLORIDE SERPL-SCNC: 105 MMOL/L (ref 96–108)
CO2 SERPL-SCNC: 28 MMOL/L (ref 21–32)
CREAT SERPL-MCNC: 1.7 MG/DL (ref 0.6–1.3)
GFR SERPL CREATININE-BSD FRML MDRD: 44 ML/MIN/1.73SQ M
GLUCOSE SERPL-MCNC: 100 MG/DL (ref 65–140)
GLUCOSE SERPL-MCNC: 136 MG/DL (ref 65–140)
GLUCOSE SERPL-MCNC: 161 MG/DL (ref 65–140)
GLUCOSE SERPL-MCNC: 170 MG/DL (ref 65–140)
GLUCOSE SERPL-MCNC: 295 MG/DL (ref 65–140)
GLUCOSE SERPL-MCNC: 38 MG/DL (ref 65–140)
MAGNESIUM SERPL-MCNC: 1.9 MG/DL (ref 1.9–2.7)
POTASSIUM SERPL-SCNC: 4.2 MMOL/L (ref 3.5–5.3)
PROT SERPL-MCNC: 4.8 G/DL (ref 6.4–8.4)
SODIUM SERPL-SCNC: 136 MMOL/L (ref 135–147)

## 2024-10-25 PROCEDURE — 83735 ASSAY OF MAGNESIUM: CPT

## 2024-10-25 PROCEDURE — 82948 REAGENT STRIP/BLOOD GLUCOSE: CPT

## 2024-10-25 PROCEDURE — 70450 CT HEAD/BRAIN W/O DYE: CPT

## 2024-10-25 PROCEDURE — 80053 COMPREHEN METABOLIC PANEL: CPT

## 2024-10-25 PROCEDURE — 99232 SBSQ HOSP IP/OBS MODERATE 35: CPT | Performed by: STUDENT IN AN ORGANIZED HEALTH CARE EDUCATION/TRAINING PROGRAM

## 2024-10-25 PROCEDURE — 99255 IP/OBS CONSLTJ NEW/EST HI 80: CPT | Performed by: STUDENT IN AN ORGANIZED HEALTH CARE EDUCATION/TRAINING PROGRAM

## 2024-10-25 RX ORDER — SODIUM CHLORIDE, SODIUM GLUCONATE, SODIUM ACETATE, POTASSIUM CHLORIDE, MAGNESIUM CHLORIDE, SODIUM PHOSPHATE, DIBASIC, AND POTASSIUM PHOSPHATE .53; .5; .37; .037; .03; .012; .00082 G/100ML; G/100ML; G/100ML; G/100ML; G/100ML; G/100ML; G/100ML
75 INJECTION, SOLUTION INTRAVENOUS CONTINUOUS
Status: DISCONTINUED | OUTPATIENT
Start: 2024-10-25 | End: 2024-10-26 | Stop reason: HOSPADM

## 2024-10-25 RX ADMIN — INSULIN ASPART 5 UNITS: 100 INJECTION, SUSPENSION SUBCUTANEOUS at 16:53

## 2024-10-25 RX ADMIN — FOLIC ACID 1 MG: 5 INJECTION, SOLUTION INTRAMUSCULAR; INTRAVENOUS; SUBCUTANEOUS at 21:10

## 2024-10-25 RX ADMIN — INSULIN ASPART 5 UNITS: 100 INJECTION, SUSPENSION SUBCUTANEOUS at 08:32

## 2024-10-25 RX ADMIN — ENOXAPARIN SODIUM 40 MG: 40 INJECTION SUBCUTANEOUS at 08:30

## 2024-10-25 RX ADMIN — THIAMINE HYDROCHLORIDE 250 MG: 100 INJECTION, SOLUTION INTRAMUSCULAR; INTRAVENOUS at 08:30

## 2024-10-25 RX ADMIN — SODIUM CHLORIDE, SODIUM GLUCONATE, SODIUM ACETATE, POTASSIUM CHLORIDE, MAGNESIUM CHLORIDE, SODIUM PHOSPHATE, DIBASIC, AND POTASSIUM PHOSPHATE 75 ML/HR: .53; .5; .37; .037; .03; .012; .00082 INJECTION, SOLUTION INTRAVENOUS at 17:53

## 2024-10-25 RX ADMIN — AMLODIPINE BESYLATE 5 MG: 5 TABLET ORAL at 08:30

## 2024-10-25 RX ADMIN — HYDRALAZINE HYDROCHLORIDE 10 MG: 20 INJECTION, SOLUTION INTRAMUSCULAR; INTRAVENOUS at 20:00

## 2024-10-25 RX ADMIN — INSULIN LISPRO 4 UNITS: 100 INJECTION, SOLUTION INTRAVENOUS; SUBCUTANEOUS at 16:53

## 2024-10-25 RX ADMIN — INSULIN LISPRO 1 UNITS: 100 INJECTION, SOLUTION INTRAVENOUS; SUBCUTANEOUS at 08:30

## 2024-10-25 RX ADMIN — AMLODIPINE BESYLATE 5 MG: 5 TABLET ORAL at 21:09

## 2024-10-25 NOTE — PLAN OF CARE
Problem: Potential for Falls  Goal: Patient will remain free of falls  Description: INTERVENTIONS:  - Educate patient/family on patient safety including physical limitations  - Instruct patient to call for assistance with activity   - Consult OT/PT to assist with strengthening/mobility   - Keep Call bell within reach  - Keep bed low and locked with side rails adjusted as appropriate  - Keep care items and personal belongings within reach  - Initiate and maintain comfort rounds  - Make Fall Risk Sign visible to staff  - Offer Toileting every 2 Hours, in advance of need  - Initiate/Maintain bed alarm  - Obtain necessary fall risk management equipment: alarms  - Apply yellow socks and bracelet for high fall risk patients  - Consider moving patient to room near nurses station  Outcome: Progressing     Problem: PAIN - ADULT  Goal: Verbalizes/displays adequate comfort level or baseline comfort level  Description: Interventions:  - Encourage patient to monitor pain and request assistance  - Assess pain using appropriate pain scale  - Administer analgesics based on type and severity of pain and evaluate response  - Implement non-pharmacological measures as appropriate and evaluate response  - Consider cultural and social influences on pain and pain management  - Notify physician/advanced practitioner if interventions unsuccessful or patient reports new pain  Outcome: Progressing     Problem: INFECTION - ADULT  Goal: Absence or prevention of progression during hospitalization  Description: INTERVENTIONS:  - Assess and monitor for signs and symptoms of infection  - Monitor lab/diagnostic results  - Monitor all insertion sites, i.e. indwelling lines, tubes, and drains  - Monitor endotracheal if appropriate and nasal secretions for changes in amount and color  - [place] appropriate cooling/warming therapies per order  - Administer medications as ordered  - Instruct and encourage patient and family to use good hand hygiene  technique  - Identify and instruct in appropriate isolation precautions for identified infection/condition  Outcome: Progressing  Goal: Absence of fever/infection during neutropenic period  Description: INTERVENTIONS:  - Monitor WBC    Outcome: Progressing     Problem: SAFETY ADULT  Goal: Patient will remain free of falls  Description: INTERVENTIONS:  - Educate patient/family on patient safety including physical limitations  - Instruct patient to call for assistance with activity   - Consult OT/PT to assist with strengthening/mobility   - Keep Call bell within reach  - Keep bed low and locked with side rails adjusted as appropriate  - Keep care items and personal belongings within reach  - Initiate and maintain comfort rounds  - Make Fall Risk Sign visible to staff  - Offer Toileting every 2 Hours, in advance of need  - Initiate/Maintain bed alarm  - Obtain necessary fall risk management equipment: alarms  - Apply yellow socks and bracelet for high fall risk patients  - Consider moving patient to room near nurses station  Outcome: Progressing  Goal: Maintain or return to baseline ADL function  Description: INTERVENTIONS:  -  Assess patient's ability to carry out ADLs; assess patient's baseline for ADL function and identify physical deficits which impact ability to perform ADLs (bathing, care of mouth/teeth, toileting, grooming, dressing, etc.)  - Assess/evaluate cause of self-care deficits   - Assess range of motion  - Assess patient's mobility; develop plan if impaired  - Assess patient's need for assistive devices and provide as appropriate  - Encourage maximum independence but intervene and supervise when necessary  - Involve family in performance of ADLs  - Assess for home care needs following discharge   - Consider OT consult to assist with ADL evaluation and planning for discharge  - Provide patient education as appropriate  Outcome: Progressing  Goal: Maintains/Returns to pre admission functional  level  Description: INTERVENTIONS:  - Perform AM-PAC 6 Click Basic Mobility/ Daily Activity assessment daily.  - Set and communicate daily mobility goal to care team and patient/family/caregiver.   - Collaborate with rehabilitation services on mobility goals if consulted  - Perform Range of Motion 3 times a day.  - Reposition patient every 2 hours.  - Dangle patient 3 times a day  - Stand patient 3 times a day  - Ambulate patient 3 times a day  - Out of bed to chair 3 times a day   - Out of bed for meals 3 times a day  - Out of bed for toileting  - Record patient progress and toleration of activity level   Outcome: Progressing     Problem: DISCHARGE PLANNING  Goal: Discharge to home or other facility with appropriate resources  Description: INTERVENTIONS:  - Identify barriers to discharge w/patient and caregiver  - Arrange for needed discharge resources and transportation as appropriate  - Identify discharge learning needs (meds, wound care, etc.)  - Arrange for interpretive services to assist at discharge as needed  - Refer to Case Management Department for coordinating discharge planning if the patient needs post-hospital services based on physician/advanced practitioner order or complex needs related to functional status, cognitive ability, or social support system  Outcome: Progressing     Problem: Knowledge Deficit  Goal: Patient/family/caregiver demonstrates understanding of disease process, treatment plan, medications, and discharge instructions  Description: Complete learning assessment and assess knowledge base.  Interventions:  - Provide teaching at level of understanding  - Provide teaching via preferred learning methods  Outcome: Progressing     Problem: NEUROSENSORY - ADULT  Goal: Achieves stable or improved neurological status  Description: INTERVENTIONS  - Monitor and report changes in neurological status  - Monitor vital signs such as temperature, blood pressure, glucose, and any other labs ordered    - Initiate measures to prevent increased intracranial pressure  - Monitor for seizure activity and implement precautions if appropriate      Outcome: Progressing  Goal: Remains free of injury related to seizures activity  Description: INTERVENTIONS  - Maintain airway, patient safety  and administer oxygen as ordered  - Monitor patient for seizure activity, document and report duration and description of seizure to physician/advanced practitioner  - If seizure occurs,  ensure patient safety during seizure  - Reorient patient post seizure  - Seizure pads on all 4 side rails  - Instruct patient/family to notify RN of any seizure activity including if an aura is experienced  - Instruct patient/family to call for assistance with activity based on nursing assessment  - Administer anti-seizure medications if ordered    Outcome: Progressing  Goal: Achieves maximal functionality and self care  Description: INTERVENTIONS  - Monitor swallowing and airway patency with patient fatigue and changes in neurological status  - Encourage and assist patient to increase activity and self care.   - Encourage visually impaired, hearing impaired and aphasic patients to use assistive/communication devices  Outcome: Progressing     Problem: CARDIOVASCULAR - ADULT  Goal: Maintains optimal cardiac output and hemodynamic stability  Description: INTERVENTIONS:  - Monitor I/O, vital signs and rhythm  - Monitor for S/S and trends of decreased cardiac output  - Administer and titrate ordered vasoactive medications to optimize hemodynamic stability  - Assess quality of pulses, skin color and temperature  - Assess for signs of decreased coronary artery perfusion  - Instruct patient to report change in severity of symptoms  Outcome: Progressing  Goal: Absence of cardiac dysrhythmias or at baseline rhythm  Description: INTERVENTIONS:  - Continuous cardiac monitoring, vital signs, obtain 12 lead EKG if ordered  - Administer antiarrhythmic and heart  rate control medications as ordered  - Monitor electrolytes and administer replacement therapy as ordered  Outcome: Progressing     Problem: GASTROINTESTINAL - ADULT  Goal: Minimal or absence of nausea and/or vomiting  Description: INTERVENTIONS:  - Administer IV fluids if ordered to ensure adequate hydration  - Maintain NPO status until nausea and vomiting are resolved  - Nasogastric tube if ordered  - Administer ordered antiemetic medications as needed  - Provide nonpharmacologic comfort measures as appropriate  - Advance diet as tolerated, if ordered  - Consider nutrition services referral to assist patient with adequate nutrition and appropriate food choices  Outcome: Progressing  Goal: Maintains or returns to baseline bowel function  Description: INTERVENTIONS:  - Assess bowel function  - Encourage oral fluids to ensure adequate hydration  - Administer IV fluids if ordered to ensure adequate hydration  - Administer ordered medications as needed  - Encourage mobilization and activity  - Consider nutritional services referral to assist patient with adequate nutrition and appropriate food choices  Outcome: Progressing  Goal: Maintains adequate nutritional intake  Description: INTERVENTIONS:  - Monitor percentage of each meal consumed  - Identify factors contributing to decreased intake, treat as appropriate  - Assist with meals as needed  - Monitor I&O, weight, and lab values if indicated  - Obtain nutrition services referral as needed  Outcome: Progressing  Goal: Oral mucous membranes remain intact  Description: INTERVENTIONS  - Assess oral mucosa and hygiene practices  - Implement preventative oral hygiene regimen  - Implement oral medicated treatments as ordered  - Initiate Nutrition services referral as needed  Outcome: Progressing     Problem: GENITOURINARY - ADULT  Goal: Maintains or returns to baseline urinary function  Description: INTERVENTIONS:  - Assess urinary function  - Encourage oral fluids to  ensure adequate hydration if ordered  - Administer IV fluids as ordered to ensure adequate hydration  - Administer ordered medications as needed  - Offer frequent toileting  - Follow urinary retention protocol if ordered  Outcome: Progressing  Goal: Absence of urinary retention  Description: INTERVENTIONS:  - Assess patient’s ability to void and empty bladder  - Monitor I/O  - Bladder scan as needed  - Discuss with physician/AP medications to alleviate retention as needed  - Discuss catheterization for long term situations as appropriate  Outcome: Progressing     Problem: METABOLIC, FLUID AND ELECTROLYTES - ADULT  Goal: Electrolytes maintained within normal limits  Description: INTERVENTIONS:  - Monitor labs and assess patient for signs and symptoms of electrolyte imbalances  - Administer electrolyte replacement as ordered  - Monitor response to electrolyte replacements, including repeat lab results as appropriate  - Instruct patient on fluid and nutrition as appropriate  Outcome: Progressing  Goal: Fluid balance maintained  Description: INTERVENTIONS:  - Monitor labs   - Monitor I/O and WT  - Instruct patient on fluid and nutrition as appropriate  - Assess for signs & symptoms of volume excess or deficit  Outcome: Progressing  Goal: Glucose maintained within target range  Description: INTERVENTIONS:  - Monitor Blood Glucose as ordered  - Assess for signs and symptoms of hyperglycemia and hypoglycemia  - Administer ordered medications to maintain glucose within target range  - Assess nutritional intake and initiate nutrition service referral as needed  Outcome: Progressing     Problem: SKIN/TISSUE INTEGRITY - ADULT  Goal: Skin Integrity remains intact(Skin Breakdown Prevention)  Description: Assess:  -Perform Jose assessment every shift  -Clean and moisturize skin every shift  -Inspect skin when repositioning, toileting, and assisting with ADLS  -Assess under medical devices such as SCD's every shift  -Assess  extremities for adequate circulation and sensation     Bed Management:  -Have minimal linens on bed & keep smooth, unwrinkled  -Change linens as needed when moist or perspiring  -Avoid sitting or lying in one position for more than 2 hours while in bed  -Keep HOB at 30 degrees     Toileting:  -Offer bedside commode  -Assess for incontinence every 2 hours  -Use incontinent care products after each incontinent episode such as foaming cleanser    Activity:  -Mobilize patient 3 times a day  -Encourage activity and walks on unit  -Encourage or provide ROM exercises   -Turn and reposition patient every 2 Hours  -Use appropriate equipment to lift or move patient in bed  -Instruct/ Assist with weight shifting every 120 mins when out of bed in chair  -Consider limitation of chair time 4 hour intervals    Skin Care:  -Avoid use of baby powder, tape, friction and shearing, hot water or constrictive clothing  -Relieve pressure over bony prominences using wedges  -Do not massage red bony areas    Next Steps:  -Teach patient strategies to minimize risks such as weight shifting   -Consider consults to  interdisciplinary teams such as wound care, pt/ot  Outcome: Progressing     Problem: HEMATOLOGIC - ADULT  Goal: Maintains hematologic stability  Description: INTERVENTIONS  - Assess for signs and symptoms of bleeding or hemorrhage  - Monitor labs  - Administer supportive blood products/factors as ordered and appropriate  Outcome: Progressing     Problem: MUSCULOSKELETAL - ADULT  Goal: Maintain or return mobility to safest level of function  Description: INTERVENTIONS:  - Assess patient's ability to carry out ADLs; assess patient's baseline for ADL function and identify physical deficits which impact ability to perform ADLs (bathing, care of mouth/teeth, toileting, grooming, dressing, etc.)  - Assess/evaluate cause of self-care deficits   - Assess range of motion  - Assess patient's mobility  - Assess patient's need for assistive  devices and provide as appropriate  - Encourage maximum independence but intervene and supervise when necessary  - Involve family in performance of ADLs  - Assess for home care needs following discharge   - Consider OT consult to assist with ADL evaluation and planning for discharge  - Provide patient education as appropriate  Outcome: Progressing  Goal: Maintain proper alignment of affected body part  Description: INTERVENTIONS:  - Support, maintain and protect limb and body alignment  - Provide patient/ family with appropriate education  Outcome: Progressing     Problem: Prexisting or High Potential for Compromised Skin Integrity  Goal: Skin integrity is maintained or improved  Description: INTERVENTIONS:  - Identify patients at risk for skin breakdown  - Assess and monitor skin integrity  - Assess and monitor nutrition and hydration status  - Monitor labs   - Assess for incontinence   - Turn and reposition patient  - Assist with mobility/ambulation  - Relieve pressure over bony prominences  - Avoid friction and shearing  - Provide appropriate hygiene as needed including keeping skin clean and dry  - Evaluate need for skin moisturizer/barrier cream  - Collaborate with interdisciplinary team   - Patient/family teaching  - Consider wound care consult   Outcome: Progressing

## 2024-10-25 NOTE — ASSESSMENT & PLAN NOTE
Presented to the hospital on 10/22/24 with witnessed tonic clonic seizures. Etiology of seizures metabolic process vs. Alcohol withdrawal as patient reported last drink was 2 days prior to admission.   Patient received phenobarbital load of 621 mg during current hospitalization   Seizure precautions   Neurology recommendations appreciated

## 2024-10-25 NOTE — ASSESSMENT & PLAN NOTE
Admission glucose 794, pH 7.2  lactic acid elevated at 9.9, no elevation in anion gap   Admitted to the ICU for insulin gtt and fluids   Insulin gtt discontinued on 10/23/24 as blood glucose well controlled, patient transitioned to [facility] level of care on 10/24/24

## 2024-10-25 NOTE — PLAN OF CARE
Problem: Potential for Falls  Goal: Patient will remain free of falls  Description: INTERVENTIONS:  - Educate patient/family on patient safety including physical limitations  - Instruct patient to call for assistance with activity   - Consult OT/PT to assist with strengthening/mobility   - Keep Call bell within reach  - Keep bed low and locked with side rails adjusted as appropriate  - Keep care items and personal belongings within reach  - Initiate and maintain comfort rounds  - Make Fall Risk Sign visible to staff  - Offer Toileting every  Hours, in advance of need  - Initiate/Maintain alarm  - Obtain necessary fall risk management equipment:   - Apply yellow socks and bracelet for high fall risk patients  - Consider moving patient to room near nurses station  Outcome: Progressing     Problem: PAIN - ADULT  Goal: Verbalizes/displays adequate comfort level or baseline comfort level  Description: Interventions:  - Encourage patient to monitor pain and request assistance  - Assess pain using appropriate pain scale  - Administer analgesics based on type and severity of pain and evaluate response  - Implement non-pharmacological measures as appropriate and evaluate response  - Consider cultural and social influences on pain and pain management  - Notify physician/advanced practitioner if interventions unsuccessful or patient reports new pain  Outcome: Progressing     Problem: INFECTION - ADULT  Goal: Absence or prevention of progression during hospitalization  Description: INTERVENTIONS:  - Assess and monitor for signs and symptoms of infection  - Monitor lab/diagnostic results  - Monitor all insertion sites, i.e. indwelling lines, tubes, and drains  - Monitor endotracheal if appropriate and nasal secretions for changes in amount and color  - [place] appropriate cooling/warming therapies per order  - Administer medications as ordered  - Instruct and encourage patient and family to use good hand hygiene technique  -  Identify and instruct in appropriate isolation precautions for identified infection/condition  Outcome: Progressing  Goal: Absence of fever/infection during neutropenic period  Description: INTERVENTIONS:  - Monitor WBC    Outcome: Progressing

## 2024-10-25 NOTE — ASSESSMENT & PLAN NOTE
54 year old male with heavy alcohol abuse, prior polysubstance abuse, diabetes, PAF, not on AC, and HTN originally presented to the ED due to poor PO intake and concern for hypoglycemia (per family). On EMS arrival, patient was hyperglycemic. In the ED, patient had brief generalized tonic clonic seizure activity, that ceased with Ativan. He was post-ictal following the episode.     Workup:   Labs on presentation: hyperglycemia (794), hyponatremia (133), leukocytosis (14.83), elevated creatinine (1.57), lactic acid (9.9) phosphorus (1.4)  UDS negative. UA negative. Ethanol < 10, Ammonia WNL, CK low.     Seizure presentation is similar to when seen by neurology in January 2024- At that time, patient found to be in HHS and with Ethanol < 10.     Suspect provoked seizures in setting of significant hyperglycemia and/or alcohol withdrawal.     Plan:   - Obtain CTH given seizure activity and elevated BPs > 200 systolic on arrival   - If CTH negative, can defer MRI. Can also defer routine EEG.  - No AEDs at this time  - Seizure precautions  - As unable to verify if patient is driving, will file [employer] form. Per chart review, as of January 2024, patient and/or family reported he did not drive. Will provide seizure precautions/driving restrictions in [place] on discharge paperwork.   - Medical management and supportive care per primary team. Correction of any metabolic or infectious disturbances. Goal euglycemia.

## 2024-10-25 NOTE — ASSESSMENT & PLAN NOTE
Patient's creatinine is now at baseline   IVF hydration  Avoid nephrotoxic agents  Continue monitoring renal function

## 2024-10-25 NOTE — ASSESSMENT & PLAN NOTE
Lab Results   Component Value Date    HGBA1C 11.2 (H) 10/23/2024       Recent Labs     10/25/24  0747 10/25/24  1144 10/25/24  1222 10/25/24  1622   POCGLU 161* 38* 100 295*       Blood Sugar Average: Last 72 hrs:  (P) [id number]    Patient with a history of DM2 with hyperglycemia   Home medications NovoLOG mix 70/30 -inject 5 units BID with meals   Patient has a history of non-compliance with insulin administration   Hgb A1C 11.2   Resumed patient's home regimen in addition to SSI coverage   Adjust regimen as indicated   Accuchecks AC/HS  Carb-controlled diet  Hypoglycemia protocol   Due to multiple episodes of hyperglycemia would recommend outpatient endocrinology follow up and opthalmologic outpatient follow up

## 2024-10-25 NOTE — PROGRESS NOTES
Progress Note - Hospitalist   Name: Wes Araujo 54 y.o. male I MRN: [id number]  Unit/Bed#: [place] [address] -01 I Date of Admission: 10/22/2024   Date of Service: 10/25/2024 I Hospital Day: 3    Assessment & Plan  Acute encephalopathy  In the setting of hyperglycemia vs. Alcohol withdrawal   Patient is alert and oriented to person and place   Continue High Dose thiamine 500mg TID for 8 doses   Continue to monitor blood sugars   Seizure (HCC)  Presented to the hospital on 10/22/24 with witnessed tonic clonic seizures. Etiology of seizures metabolic process vs. Alcohol withdrawal as patient reported last drink was 2 days prior to admission.   Patient received phenobarbital load of 621 mg during current hospitalization   Seizure precautions   Neurology recommendations appreciated  Alcohol withdrawal (HCC)  Patient endorses current daily alcohol use   At this time patient is not endorsing further withdrawal symptoms. VSS denies any tremors, nausea, vomiting, diarrhea, or anxiety  Received a total of 621 mg of phenobarbital during this admission.   Discussed alcohol cessation with patient, declining rehab  CRS consulted  CM assisting with HOST referral - patient declined  Type 2 diabetes mellitus (HCC)  Lab Results   Component Value Date    HGBA1C 11.2 (H) 10/23/2024       Recent Labs     10/25/24  0747 10/25/24  1144 10/25/24  1222 10/25/24  1622   POCGLU 161* 38* 100 295*       Blood Sugar Average: Last 72 hrs:  (P) [id number]    Patient with a history of DM2 with hyperglycemia   Home medications NovoLOG mix 70/30 -inject 5 units BID with meals   Patient has a history of non-compliance with insulin administration   Hgb A1C 11.2   Resumed patient's home regimen in addition to SSI coverage   Adjust regimen as indicated   Accuchecks AC/HS  Carb-controlled diet  Hypoglycemia protocol   Due to multiple episodes of hyperglycemia would recommend outpatient endocrinology follow up and opthalmologic outpatient follow  up  Hyperglycemia without ketosis  Admission glucose 794, pH 7.2  lactic acid elevated at 9.9, no elevation in anion gap   Admitted to the ICU for insulin gtt and fluids   Insulin gtt discontinued on 10/23/24 as blood glucose well controlled, patient transitioned to [facility] level of care on 10/24/24     Hypertensive urgency  BP stable at this time  Continue PTA BP regimen  ZENAIDA (acute kidney injury) (HCC)  Patient's creatinine is now at baseline   IVF hydration  Avoid nephrotoxic agents  Continue monitoring renal function     VTE Pharmacologic Prophylaxis:   Moderate Risk (Score 3-4) - Pharmacological DVT Prophylaxis Ordered: enoxaparin (Lovenox).    Mobility:   Basic Mobility Inpatient Raw Score: 22  -HLM Goal: 7: Walk 25 feet or more  -HLM Achieved: 6: Walk 10 steps or more      Patient Centered Rounds: I performed bedside rounds with nursing staff today.   Discussions with Specialists or Other Care Team Provider: case management    Education and Discussions with Family / Patient: Patient declined call to .     Current Length of Stay: 3 day(s)  Current Patient Status: Inpatient   Certification Statement: The patient will continue to require additional inpatient hospital stay due to ongoing management of alcohol withdrawal and encephalopathy  Discharge Plan: Anticipate discharge in 24-48 hrs to home.    Code Status: Level 1 - Full Code    Subjective   Patient seen and examined at bedside. Complaining that the food he is being given is not enough. Denies any other complaints    Objective :  Temp:  [97.7 °F (36.5 °C)-98.3 °F (36.8 °C)] 97.7 °F (36.5 °C)  HR:  [82-88] 87  BP: (151-162)/(87-93) 162/93  Resp:  [18] 18  SpO2:  [97 %-98 %] 98 %  O2 Device: None (Room air)    Body mass index is 19.96 kg/m².     Input and Output Summary (last 24 hours):     Intake/Output Summary (Last 24 hours) at 10/25/2024 1745  Last data filed at 10/24/2024 2301  Gross per 24 hour   Intake 720 ml   Output --   Net 720         Physical Exam  Vitals and nursing note reviewed.   Constitutional:       General: He is not in acute distress.     Appearance: He is well-developed.   HENT:      Head: Normocephalic and atraumatic.   Eyes:      Conjunctiva/sclera: Conjunctivae normal.   Cardiovascular:      Rate and Rhythm: Normal rate and regular rhythm.      Heart sounds: No murmur heard.  Pulmonary:      Effort: Pulmonary effort is normal. No respiratory distress.      Breath sounds: Normal breath sounds.   Abdominal:      Palpations: Abdomen is soft.      Tenderness: There is no abdominal tenderness.   Musculoskeletal:         General: No swelling.      Cervical back: Neck supple.   Skin:     General: Skin is warm and dry.      Capillary Refill: Capillary refill takes less than 2 seconds.   Neurological:      Mental Status: He is alert.   Psychiatric:         Mood and Affect: Mood normal.           Lines/Drains:              Lab Results: I have reviewed the following results:   Results from last 7 days   Lab Units 10/24/24  0828   WBC Thousand/uL 10.49*   HEMOGLOBIN g/dL 10.9*   HEMATOCRIT % 29.6*   PLATELETS Thousands/uL 175   SEGS PCT % 70   LYMPHO PCT % 22   MONO PCT % 6   EOS PCT % 2     Results from last 7 days   Lab Units 10/25/24  0502   SODIUM mmol/L 136   POTASSIUM mmol/L 4.2   CHLORIDE mmol/L 105   CO2 mmol/L 28   BUN mg/dL 11   CREATININE mg/dL 1.70*   ANION GAP mmol/L 3*   CALCIUM mg/dL 7.6*   ALBUMIN g/dL 2.4*   TOTAL BILIRUBIN mg/dL 0.90   ALK PHOS U/L 68   ALT U/L 15   AST U/L 14   GLUCOSE RANDOM mg/dL 170*         Results from last 7 days   Lab Units 10/25/24  1622 10/25/24  1222 10/25/24  1144 10/25/24  0747 10/24/24  2058 10/24/24  1640 10/24/24  1130 10/24/24  0723 10/23/24  2346 10/23/24  2040 10/23/24  1642 10/23/24  1335   POC GLUCOSE mg/dl 295* 100 38* 161* 184* 249* 155* 162* 202* 139 227* 164*     Results from last 7 days   Lab Units 10/23/24  0451   HEMOGLOBIN A1C % 11.2*     Results from last 7 days   Lab  Units 10/22/24  1712 10/22/24  1507   LACTIC ACID mmol/L 3.1* 9.9*       Recent Cultures (last 7 days):         Imaging Results Review: I reviewed radiology reports from this admission including: CT head.  Other Study Results Review: No additional pertinent studies reviewed.    Last 24 Hours Medication List:     Current Facility-Administered Medications:     amLODIPine (NORVASC) tablet 5 mg, BID    enoxaparin (LOVENOX) subcutaneous injection 40 mg, Q24H JAISON    folic acid 1 mg in sodium chloride 0.9 % 50 mL IVPB, Q24H, Last Rate: 1 mg (10/24/24 2151)    hydrALAZINE (APRESOLINE) injection 10 mg, Q4H PRN    insulin aspart protamine-insulin aspart (NovoLOG 70/30) 100 units/mL subcutaneous injection 5 Units, BID AC    insulin lispro (HumALOG/ADMELOG) 100 units/mL subcutaneous injection 1-6 Units, 4x Daily (AC & HS) **AND** Fingerstick Glucose (POCT), 4x Daily AC and at bedtime    labetalol (NORMODYNE) injection 10 mg, Q4H PRN    multi-electrolyte (PLASMALYTE-A/ISOLYTE-S PH 7.4) IV solution, Continuous    [] thiamine (VITAMIN B1) 500 mg in sodium chloride 0.9 % 50 mL IVPB, TID, Last Rate: 500 mg (10/24/24 1722) **FOLLOWED BY** thiamine (VITAMIN B1) 250 mg in sodium chloride 0.9 % 50 mL IVPB, Daily, Last Rate: 250 mg (10/25/24 0830)    Administrative Statements   Today, Patient Was Seen By: Fannie York MD      **Please Note: This note may have been constructed using a voice recognition system.**

## 2024-10-25 NOTE — CASE MANAGEMENT
Case Management Discharge Planning Note    Patient name Wes Araujo  Location [place] [address] /[address] 2 M* MRN [id number]  : 1970 Date 10/25/2024       Current Admission Date: 10/22/2024  Current Admission Diagnosis:Acute encephalopathy   Patient Active Problem List    Diagnosis Date Noted Date Diagnosed    Hyperglycemia without ketosis 10/22/2024     Hypertensive urgency 10/22/2024     Transaminitis 2024     Alcohol-induced chronic pancreatitis (HCC) 2024     Acute low back pain 03/10/2024     SIRS (systemic inflammatory response syndrome) (HCC) 2024     Hyperosmolar hyperglycemic state (HHS) ([facility]) 2024     Alcohol abuse with history of withdrawal ([facility]) 2024     Elevated brain natriuretic peptide (BNP) level 2024     Type 2 diabetes mellitus (HCC) 2024     Chronic low back pain 2023     Alcohol withdrawal ([facility]) 2023     Diarrhea 2023     Seizure ([facility]) 2022     Illicit drug use 2022     Leukocytosis 2022     Hypothermia 2022     Cocaine use 2022     Abnormal chest x-ray 2022     Bilateral hearing loss 2020     Hypoglycemia 2020     Hypokalemia 2020     Acute renal failure superimposed on stage 3 chronic kidney disease (HCC) 2019     Anemia 2019     Hyponatremia 2019     History of atrial fibrillation 2019     Acute encephalopathy 2019     ZENAIDA (acute kidney injury) (HCC) 2019     Alcohol intoxication in active alcoholic with complication (HCC) 2016     Primary hypertension      Type 2 diabetes mellitus with hypoglycemia, with long-term current use of insulin (HCC)      Uncomplicated alcohol dependence (HCC)      Paroxysmal A-fib (HCC)      Chronic pancreatitis (HCC)      Thrombocytopenia (HCC)        LOS (days): 3  Geometric Mean LOS (GMLOS) (days):   Days to GMLOS:     OBJECTIVE:  Risk of Unplanned Readmission Score: 24.76         Current admission  status: Inpatient   Preferred Pharmacy:    [facility] - [address] [facility]  [address] [facility] [place] [address]  Phone: [phone] Fax: [phone]    [facility] - [address],  [address],  [address] [facility] [address]  Phone: [phone] Fax: [phone]    [id number] [id number] [facility] [address]  Phone: [phone] Fax: [phone]    Primary Care Provider: Rush Kebede MD    Primary Insurance: [employer]  Secondary Insurance:     DISCHARGE DETAILS:                                                                                                 Additional Comments: Received voicemail from Gilberto at [facility], patient refused D&A assessment and treatment options.  CM will continue to follow.

## 2024-10-25 NOTE — ASSESSMENT & PLAN NOTE
Patient endorses current daily alcohol use   At this time patient is not endorsing further withdrawal symptoms. VSS denies any tremors, nausea, vomiting, diarrhea, or anxiety  Received a total of 621 mg of phenobarbital during this admission.   Discussed alcohol cessation with patient, declining rehab  CRS consulted  CM assisting with HOST referral - patient declined

## 2024-10-25 NOTE — CONSULTS
Consultation - Neurology   Name: Wes Araujo 54 y.o. male I MRN: [id number]  Unit/Bed#: [place] [address] -01 I Date of Admission: 10/22/2024   Date of Service: 10/25/2024 I Hospital Day: 3   Inpatient consult to Neurology  Consult performed by: Emely Ingram PA-C  Consult ordered by: Fannie Hernandez MD        Physician Requesting Evaluation: Fannie Garcia *   Reason for Evaluation / Principal Problem: Seizure    Assessment & Plan  Seizure (HCC)  54 year old male with heavy alcohol abuse, prior polysubstance abuse, diabetes, PAF, not on AC, and HTN originally presented to the ED due to poor PO intake and concern for hypoglycemia (per family). On EMS arrival, patient was hyperglycemic. In the ED, patient had brief generalized tonic clonic seizure activity, that ceased with Ativan. He was post-ictal following the episode.     Workup:   Labs on presentation: hyperglycemia (794), hyponatremia (133), leukocytosis (14.83), elevated creatinine (1.57), lactic acid (9.9) phosphorus (1.4)  UDS negative. UA negative. Ethanol < 10, Ammonia WNL, CK low.     Seizure presentation is similar to when seen by neurology in January 2024- At that time, patient found to be in HHS and with Ethanol < 10.     Suspect provoked seizures in setting of significant hyperglycemia and/or alcohol withdrawal.     Plan:   - Obtain CTH given seizure activity and elevated BPs > 200 systolic on arrival   - If CTH negative, can defer MRI. Can also defer routine EEG.  - No AEDs at this time  - Seizure precautions  - As unable to verify if patient is driving, will file [employer] form. Per chart review, as of January 2024, patient and/or family reported he did not drive. Will provide seizure precautions/driving restrictions in [place] on discharge paperwork.   - Medical management and supportive care per primary team. Correction of any metabolic or infectious disturbances. Goal euglycemia.       Alcohol withdrawal (HCC)  -  "Continue CIWA protocol per primary team  - Certified  met with patient and offered services        Neurology follow up recommendations:   No need for outpatient neurology follow up at this time. If patient continues to have seizures, despite correction of metabolic issues and cessation of alcohol, then can be referred to neurology at a later time.     History of Present Illness       Hx and PE limited by: Language barrier, not cooperating with  line. Attempted to utilize  #[id number], who after multiple attempts of asking basic questions, could not get patient to cooperate with questioning.     HPI: Wes Araujo is a 54 y.o. male with chronic alcohol abuse, polysubstance abuse, diabetes, atrial fibrillation (not on AC), hypertension, and chronic pancreatitis presented to the ED on 10/22.    Per ED discussion with family, family called EMS because the patient has not been eating or drinking well for approximately 3 days prior to admission.  They reported that he was sweaty and shaky and were concerned that his blood sugar was low.  The patient did report to the ED that he is a heavy drinker and his last drink was about 3 days prior to admission.      In the ED triage, the patient had generalized tonic-clonic seizure activity.  He was reportedly postictal.  Patient was hypertensive in the ED, with several blood pressures greater than 200 systolic.  Lab work on arrival notable for significantly elevated blood glucose, leukocytosis, hyponatremia, elevated creatinine, elevated lactic acid, and hypophosphatemia.  UDS was negative.  Ammonia and CK unremarkable.  UA without signs of infection.    The patient was seen by neurology in the past in January 2024.  He presented to the ED at that time due to \"collapsing.\"  He had significantly elevated blood glucose on arrival and was found to be in HHS.  In the ED, he had an episode of seizure activity, lasting for 3 to 5 minutes.  " He was postictal after.  He was given 2 mg of Ativan just prior to cessation of the seizure.  His ethanol level was checked and was less than 10.  Routine EEG was deferred at that time.  He was not placed on any AEDs.    Review of Systems   Reason unable to perform ROS: Language barrier, unable to cooperate with  line.       Historical Information   Past Medical History:   Diagnosis Date    Alcohol abuse     Chronic pancreatitis (HCC)     Diabetes mellitus (HCC)     Type 2    Pancreatitis     Paroxysmal A-fib (HCC)     Thrombocytopenia (HCC)      Past Surgical History:   Procedure Laterality Date    INCISION AND DRAINAGE OF WOUND N/A 8/16/2020    Procedure: INCISION AND DRAINAGE (I&D) HEAD/FACE;  Surgeon: Estrada Walton DMD;  Location: [facility];  Service: Maxillofacial    IR BIOPSY BONE MARROW  2/7/2019    REMOVAL OF IMPACTED TOOTH - COMPLETELY BONY N/A 8/16/2020    Procedure: EXTRACTION TEETH MULTIPLE #2, 3;  Surgeon: Estrada Walton DMD;  Location: [facility];  Service: Maxillofacial     Social History   Social History     Substance and Sexual Activity   Alcohol Use Yes     Social History     Substance and Sexual Activity   Drug Use Yes    Types: Cocaine     E-Cigarette/Vaping    E-Cigarette Use Never User      E-Cigarette/Vaping Substances     Social History     Tobacco Use   Smoking Status Former    Passive exposure: Past   Smokeless Tobacco Never     Family History:   Family History   Problem Relation Age of Onset    Diabetes Mother     Hypertension Mother     Hyperlipidemia Father        Review of previous medical records was completed.     Meds/Allergies   all current active meds have been reviewed, current meds:   Current Facility-Administered Medications:     amLODIPine (NORVASC) tablet 5 mg, BID    enoxaparin (LOVENOX) subcutaneous injection 40 mg, Q24H JAISON    folic acid 1 mg in sodium chloride 0.9 % 50 mL IVPB, Q24H, Last Rate: 1 mg (10/24/24 7970)    hydrALAZINE (APRESOLINE) injection 10 mg,  Q4H PRN    insulin aspart protamine-insulin aspart (NovoLOG 70/30) 100 units/mL subcutaneous injection 5 Units, BID AC    insulin lispro (HumALOG/ADMELOG) 100 units/mL subcutaneous injection 1-6 Units, 4x Daily (AC & HS) **AND** Fingerstick Glucose (POCT), 4x Daily AC and at bedtime    labetalol (NORMODYNE) injection 10 mg, Q4H PRN    [] thiamine (VITAMIN B1) 500 mg in sodium chloride 0.9 % 50 mL IVPB, TID, Last Rate: 500 mg (10/24/24 1722) **FOLLOWED BY** thiamine (VITAMIN B1) 250 mg in sodium chloride 0.9 % 50 mL IVPB, Daily, Last Rate: 250 mg (10/25/24 0830), and PTA meds:   Prior to Admission Medications   Prescriptions Last Dose Informant Patient Reported? Taking?   Alcohol Swabs 70 % PADS   No No   Sig: May substitute brand based on insurance coverage. Check glucose TID.   Blood Glucose Monitoring Suppl (OneTouch Verio Reflect) w/Device KIT   No No   Sig: May substitute brand based on insurance coverage. Check glucose TID.   Continuous Blood Gluc Sensor (FreeStyle Pablo 3 Sensor) MISC   No No   Sig: Use 1 each every 14 (fourteen) days   Insulin Pen Needle (BD Pen Needle Claudia 2nd Gen) 32G X 4 MM MISC   No No   Sig: For use with insulin pen. Pharmacy may dispense brand covered by insurance.   Multiple Vitamin (multivitamin) tablet   No No   Sig: Take 1 tablet by mouth daily   OneTouch Delica Lancets 33G MISC   No No   Sig: May substitute brand based on insurance coverage. Check glucose TID.   amLODIPine (NORVASC) 5 mg tablet   No No   Sig: Take 1 tablet (5 mg total) by mouth 2 (two) times a day   ergocalciferol (VITAMIN D2) 50,000 units   No No   Sig: Take 1 capsule (50,000 Units total) by mouth once a week for 11 doses   glucose blood (OneTouch Verio) test strip   No No   Sig: May substitute brand based on insurance coverage. Check glucose TID.   hydrALAZINE (APRESOLINE) 25 mg tablet   No No   Sig: Take 1 tablet (25 mg total) by mouth every 8 (eight) hours   insulin aspart protamine-insulin aspart  "(NovoLOG Mix 70/30 FlexPen) 100 Units/mL injection pen   No No   Sig: Inject 5 Units under the skin 2 (two) times a day with meals   insulin isophane-insulin regular (NovoLIN 70/30 FlexPen) 100 units/mL injection pen   No No   Sig: Inject 5 Units under the skin 2 (two) times a day before meals   methocarbamol (ROBAXIN) 500 mg tablet   No No   Sig: Take 1 tablet (500 mg total) by mouth every 6 (six) hours as needed for muscle spasms   pancrelipase, Lip-Prot-Amyl, (CREON) 24,000 units   No No   Sig: Take 2 capsules (48,000 Units total) by mouth 3 (three) times a day with meals   pancrelipase, Lip-Prot-Amyl, (CREON) 24,000 units   No No   Sig: Take 24,000 units of lipase by mouth 3 (three) times a day with meals      Facility-Administered Medications: None       No Known Allergies    Objective   Vitals:Blood pressure 151/87, pulse 82, temperature 98.3 °F (36.8 °C), temperature source Oral, resp. rate 18, height 5' 9\" (1.753 m), weight 61.3 kg (135 lb 2.3 oz), SpO2 97%.,Body mass index is 19.96 kg/m².    Intake/Output Summary (Last 24 hours) at 10/25/2024 1020  Last data filed at 10/24/2024 2301  Gross per 24 hour   Intake 840 ml   Output --   Net 840 ml       Invasive Devices:   Invasive Devices       Peripheral Intravenous Line  Duration             Peripheral IV 10/25/24 Right;Ventral (anterior) Forearm <1 day                    Physical Exam  Vitals and nursing note reviewed.   Constitutional:       General: He is not in acute distress.     Appearance: He is not ill-appearing, toxic-appearing or diaphoretic.   HENT:      Head: Normocephalic and atraumatic.      Nose: Nose normal.      Mouth/Throat:      Mouth: Mucous membranes are moist.      Pharynx: Oropharynx is clear.   Eyes:      General: No scleral icterus.        Right eye: No discharge.         Left eye: No discharge.      Extraocular Movements: Extraocular movements intact.   Cardiovascular:      Rate and Rhythm: Normal rate.   Pulmonary:      Effort: " Pulmonary effort is normal. No respiratory distress.      Breath sounds: No stridor.   Skin:     General: Skin is warm and dry.   Neurological:      Mental Status: He is alert.       Neurologic Exam     Mental Status   Attention: decreased. Concentration: decreased.   Level of consciousness: alert    Cranial Nerves     CN III, IV, VI   Conjugate gaze: present    CN VII   Facial expression full, symmetric.     Motor Exam Moves all extremities spontaneously      Gait, Coordination, and Reflexes     Tremor   Resting tremor: absent      Lab Results: I have personally reviewed pertinent reports.   Imaging Studies: I have personally reviewed pertinent reports and I have personally reviewed the images.   EKG, Pathology, and Other Studies: I have personally reviewed pertinent reports.  VTE Prophylaxis: VTE covered by:  enoxaparin, Subcutaneous, 40 mg at 10/25/24 1704       Code Status: Level 1 - Full Code

## 2024-10-26 VITALS
TEMPERATURE: 98.1 F | WEIGHT: 135.8 LBS | HEART RATE: 87 BPM | HEIGHT: 69 IN | SYSTOLIC BLOOD PRESSURE: 135 MMHG | OXYGEN SATURATION: 97 % | DIASTOLIC BLOOD PRESSURE: 73 MMHG | RESPIRATION RATE: 19 BRPM | BODY MASS INDEX: 20.11 KG/M2

## 2024-10-26 LAB
ANION GAP SERPL CALCULATED.3IONS-SCNC: 4 MMOL/L (ref 4–13)
BUN SERPL-MCNC: 14 MG/DL (ref 5–25)
CALCIUM SERPL-MCNC: 7.4 MG/DL (ref 8.4–10.2)
CHLORIDE SERPL-SCNC: 104 MMOL/L (ref 96–108)
CO2 SERPL-SCNC: 26 MMOL/L (ref 21–32)
CREAT SERPL-MCNC: 1.54 MG/DL (ref 0.6–1.3)
ERYTHROCYTE [DISTWIDTH] IN BLOOD BY AUTOMATED COUNT: 10.9 % (ref 11.6–15.1)
GFR SERPL CREATININE-BSD FRML MDRD: 50 ML/MIN/1.73SQ M
GLUCOSE SERPL-MCNC: 147 MG/DL (ref 65–140)
GLUCOSE SERPL-MCNC: 171 MG/DL (ref 65–140)
GLUCOSE SERPL-MCNC: 185 MG/DL (ref 65–140)
GLUCOSE SERPL-MCNC: 185 MG/DL (ref 65–140)
HCT VFR BLD AUTO: 25.2 % (ref 36.5–49.3)
HGB BLD-MCNC: 9 G/DL (ref 12–17)
MAGNESIUM SERPL-MCNC: 1.6 MG/DL (ref 1.9–2.7)
MCH RBC QN AUTO: 32.1 PG (ref 26.8–34.3)
MCHC RBC AUTO-ENTMCNC: 35.7 G/DL (ref 31.4–37.4)
MCV RBC AUTO: 90 FL (ref 82–98)
PLATELET # BLD AUTO: 165 THOUSANDS/UL (ref 149–390)
PMV BLD AUTO: 11.5 FL (ref 8.9–12.7)
POTASSIUM SERPL-SCNC: 4.1 MMOL/L (ref 3.5–5.3)
RBC # BLD AUTO: 2.8 MILLION/UL (ref 3.88–5.62)
SODIUM SERPL-SCNC: 134 MMOL/L (ref 135–147)
WBC # BLD AUTO: 9.69 THOUSAND/UL (ref 4.31–10.16)

## 2024-10-26 PROCEDURE — 80048 BASIC METABOLIC PNL TOTAL CA: CPT | Performed by: STUDENT IN AN ORGANIZED HEALTH CARE EDUCATION/TRAINING PROGRAM

## 2024-10-26 PROCEDURE — 83735 ASSAY OF MAGNESIUM: CPT | Performed by: STUDENT IN AN ORGANIZED HEALTH CARE EDUCATION/TRAINING PROGRAM

## 2024-10-26 PROCEDURE — 85027 COMPLETE CBC AUTOMATED: CPT | Performed by: STUDENT IN AN ORGANIZED HEALTH CARE EDUCATION/TRAINING PROGRAM

## 2024-10-26 PROCEDURE — 82948 REAGENT STRIP/BLOOD GLUCOSE: CPT

## 2024-10-26 PROCEDURE — 99239 HOSP IP/OBS DSCHRG MGMT >30: CPT | Performed by: STUDENT IN AN ORGANIZED HEALTH CARE EDUCATION/TRAINING PROGRAM

## 2024-10-26 RX ORDER — INSULIN ASPART 100 [IU]/ML
5 INJECTION, SUSPENSION SUBCUTANEOUS 2 TIMES DAILY WITH MEALS
Qty: 15 ML | Refills: 0 | Status: SHIPPED | OUTPATIENT
Start: 2024-10-26 | End: 2024-10-28

## 2024-10-26 RX ORDER — FOLIC ACID 1 MG/1
1 TABLET ORAL DAILY
Qty: 30 TABLET | Refills: 0 | Status: SHIPPED | OUTPATIENT
Start: 2024-10-26 | End: 2024-11-25

## 2024-10-26 RX ORDER — MAGNESIUM SULFATE HEPTAHYDRATE 40 MG/ML
2 INJECTION, SOLUTION INTRAVENOUS ONCE
Status: COMPLETED | OUTPATIENT
Start: 2024-10-26 | End: 2024-10-26

## 2024-10-26 RX ORDER — HUMAN INSULIN 100 [IU]/ML
5 INJECTION, SUSPENSION SUBCUTANEOUS
Qty: 15 ML | Refills: 0 | Status: SHIPPED | OUTPATIENT
Start: 2024-10-26 | End: 2024-10-28

## 2024-10-26 RX ADMIN — LABETALOL HYDROCHLORIDE 10 MG: 5 INJECTION, SOLUTION INTRAVENOUS at 08:18

## 2024-10-26 RX ADMIN — SODIUM CHLORIDE, SODIUM GLUCONATE, SODIUM ACETATE, POTASSIUM CHLORIDE, MAGNESIUM CHLORIDE, SODIUM PHOSPHATE, DIBASIC, AND POTASSIUM PHOSPHATE 75 ML/HR: .53; .5; .37; .037; .03; .012; .00082 INJECTION, SOLUTION INTRAVENOUS at 06:29

## 2024-10-26 RX ADMIN — AMLODIPINE BESYLATE 5 MG: 5 TABLET ORAL at 08:15

## 2024-10-26 RX ADMIN — MAGNESIUM SULFATE HEPTAHYDRATE 2 G: 40 INJECTION, SOLUTION INTRAVENOUS at 09:40

## 2024-10-26 RX ADMIN — HYDRALAZINE HYDROCHLORIDE 10 MG: 20 INJECTION, SOLUTION INTRAMUSCULAR; INTRAVENOUS at 05:14

## 2024-10-26 RX ADMIN — THIAMINE HYDROCHLORIDE 250 MG: 100 INJECTION, SOLUTION INTRAMUSCULAR; INTRAVENOUS at 08:14

## 2024-10-26 RX ADMIN — ENOXAPARIN SODIUM 40 MG: 40 INJECTION SUBCUTANEOUS at 08:15

## 2024-10-26 RX ADMIN — INSULIN ASPART 5 UNITS: 100 INJECTION, SUSPENSION SUBCUTANEOUS at 08:15

## 2024-10-26 RX ADMIN — INSULIN LISPRO 1 UNITS: 100 INJECTION, SOLUTION INTRAVENOUS; SUBCUTANEOUS at 08:15

## 2024-10-26 NOTE — ASSESSMENT & PLAN NOTE
Lab Results   Component Value Date    HGBA1C 11.2 (H) 10/23/2024       Recent Labs     10/25/24  2202 10/26/24  0626 10/26/24  0749 10/26/24  1134   POCGLU 136 185* 185* 147*       Blood Sugar Average: Last 72 hrs:  (P) [id number]    Patient with a history of DM2 with hyperglycemia   Home medications NovoLOG mix 70/30 -inject 5 units BID with meals   Patient has a history of non-compliance with insulin administration   Hgb A1C 11.2   Resumed patient's home regimen in addition to SSI coverage   Adjust regimen as indicated   Accuchecks AC/HS  Carb-controlled diet  Hypoglycemia protocol   Due to multiple episodes of hyperglycemia endocrinology referral placed for outpatient follow-up

## 2024-10-26 NOTE — DISCHARGE SUMMARY
Discharge Summary - Hospitalist   Name: Wes Araujo 54 y.o. male I MRN: [id number]  Unit/Bed#: [place] [address] -01 I Date of Admission: 10/22/2024   Date of Service: 10/26/2024 I Hospital Day: 4     Assessment & Plan  Acute encephalopathy  In the setting of hyperglycemia and alcohol withdrawal   Patient is alert and oriented to person and place   Received high-dose thiamine  Blood sugars improved  Patient back to baseline and requesting to go home  Seizure (HCC)  Presented to the hospital on 10/22/24 with witnessed tonic clonic seizures  Likely metabolic in nature and possibly related to withdrawal  Patient received phenobarbital load of 621 mg during current hospitalization with no recurrence of seizures  Seizure precautions   Neurology recommendations appreciated  Alcohol withdrawal (HCC)  Patient endorses current daily alcohol use   At this time patient is not endorsing further withdrawal symptoms. VSS denies any tremors, nausea, vomiting, diarrhea, or anxiety  Received a total of 621 mg of phenobarbital during this admission.   Discussed alcohol cessation with patient, declining rehab  CRS consulted  CM assisting with HOST referral - patient declined  Type 2 diabetes mellitus (HCC)  Lab Results   Component Value Date    HGBA1C 11.2 (H) 10/23/2024       Recent Labs     10/25/24  2202 10/26/24  0626 10/26/24  0749 10/26/24  1134   POCGLU 136 185* 185* 147*       Blood Sugar Average: Last 72 hrs:  (P) [id number]    Patient with a history of DM2 with hyperglycemia   Home medications NovoLOG mix 70/30 -inject 5 units BID with meals   Patient has a history of non-compliance with insulin administration   Hgb A1C 11.2   Resumed patient's home regimen in addition to SSI coverage   Adjust regimen as indicated   Accuchecks AC/HS  Carb-controlled diet  Hypoglycemia protocol   Due to multiple episodes of hyperglycemia endocrinology referral placed for outpatient follow-up  Hyperglycemia without ketosis  Admission  glucose 794, pH 7.2  lactic acid elevated at 9.9, no elevation in anion gap   Admitted to the ICU for insulin gtt and fluids   Insulin gtt discontinued on 10/23/24 as blood glucose well controlled, patient transitioned to [facility] level of care on 10/24/24     Hypertensive urgency  BP stable at this time  Continue PTA BP regimen  ZENAIDA (acute kidney injury) (HCC)  Patient's creatinine is now at baseline   IVF hydration  Avoid nephrotoxic agents  Continue monitoring renal function      Medical Problems       Resolved Problems  Date Reviewed: 10/24/2024   None       Discharging Physician / Practitioner: Fannie York MD  PCP: Rush Kebede MD  Admission Date:   Admission Orders (From admission, onward)       Ordered        10/22/24 1630  INPATIENT ADMISSION  Once                          Discharge Date: 10/26/24    Consultations During Hospital Stay:  ICU  HOST  CM  Neurology    Procedures Performed:   none    Significant Findings / Test Results:   CT head wo contrast   Final Result by Ming Adan MD (10/25 1537)      1.  No acute intracranial CT abnormality. No significant interval change.   2.  Mild ethmoidal sinus disease.                  Workstation performed: [id number]           Incidental Findings:   N/A     Test Results Pending at Discharge (will require follow up):   none     Outpatient Tests Requested:  none    Complications:  none    Reason for Admission: Hyperglycemia without ketosis and alcohol withdrawal    Hospital Course:   Wes Araujo is a 54 y.o. male patient who originally presented to the hospital on 10/22/2024 due to encephalopathy likely secondary to hyperglycemia and alcohol withdrawal. At this time, he also had a witnessed seizure for which he received lorazepam. He was admitted to the ICU and started on an insulin drip with improvement in his sugars. Patient also received a phenobarbital load for alcohol withdrawal with improvement in his symptoms.  "    Patient was then downgraded to med-surg. Neurology was consulted for his seizures and for now, no recommendations for AED due to likely etiology being metabolic/toxic in nature given hyperglycemia and alcohol withdrawal.     Patient currently at baseline mental status at this time with improvement in labs. He was referred to HOST and recommended rehab however patient declines.     Patient is stable for discharge home at this time. Instructed to take all medications as prescribed. Given non-adherence to diabetic regimen and uncontrolled sugars, Endocrinology referral was made.     Please see above list of diagnoses and related plan for additional information.     Condition at Discharge: stable    Discharge Day Visit / Exam:   Subjective:  Patient seen and examined at bedside. No complaints at this time. Wants to go home.     Vitals: Blood Pressure: 135/73 (10/26/24 1134)  Pulse: 87 (10/26/24 1134)  Temperature: 98.1 °F (36.7 °C) (10/26/24 1134)  Temp Source: Oral (10/25/24 1441)  Respirations: 19 (10/26/24 1134)  Height: 5' 9\" (175.3 cm) (10/22/24 2000)  Weight - Scale: 61.6 kg (135 lb 12.9 oz) (10/26/24 0530)  SpO2: 97 % (10/26/24 1134)    Physical Exam  Vitals and nursing note reviewed.   Constitutional:       General: He is not in acute distress.     Appearance: He is well-developed.   HENT:      Head: Normocephalic and atraumatic.   Eyes:      Conjunctiva/sclera: Conjunctivae normal.   Cardiovascular:      Rate and Rhythm: Normal rate and regular rhythm.      Heart sounds: No murmur heard.  Pulmonary:      Effort: Pulmonary effort is normal. No respiratory distress.      Breath sounds: Normal breath sounds.   Abdominal:      Palpations: Abdomen is soft.      Tenderness: There is no abdominal tenderness.   Musculoskeletal:         General: No swelling.      Cervical back: Neck supple.   Skin:     General: Skin is warm and dry.      Capillary Refill: Capillary refill takes less than 2 seconds.   Neurological: "      Mental Status: He is alert.   Psychiatric:         Mood and Affect: Mood normal.          Discharge instructions/Information to patient and family:   See after visit summary for information provided to patient and family.      Provisions for Follow-Up Care:  See after visit summary for information related to follow-up care and any pertinent home health orders.      Mobility at time of Discharge:   Basic Mobility Inpatient Raw Score: 22  JH-HLM Goal: 7: Walk 25 feet or more  JH-HLM Achieved: 7: Walk 25 feet or more  HLM Goal achieved. Continue to encourage appropriate mobility.     Disposition:   Home    Planned Readmission: none    Discharge Medications:  See after visit summary for reconciled discharge medications provided to patient and/or family.      Administrative Statements   Discharge Statement:  I have spent a total time of 60 minutes in caring for this patient on the day of the visit/encounter. >30 minutes of time was spent on: Diagnostic results, Impressions, Counseling / Coordination of care, Documenting in the medical record, Reviewing / ordering tests, medicine, procedures  , and Communicating with other healthcare professionals .    **Please Note: This note may have been constructed using a voice recognition system**

## 2024-10-26 NOTE — ASSESSMENT & PLAN NOTE
Presented to the hospital on 10/22/24 with witnessed tonic clonic seizures  Likely metabolic in nature and possibly related to withdrawal  Patient received phenobarbital load of 621 mg during current hospitalization with no recurrence of seizures  Seizure precautions   Neurology recommendations appreciated

## 2024-10-26 NOTE — ASSESSMENT & PLAN NOTE
In the setting of hyperglycemia and alcohol withdrawal   Patient is alert and oriented to person and place   Received high-dose thiamine  Blood sugars improved  Patient back to baseline and requesting to go home

## 2024-10-26 NOTE — ASSESSMENT & PLAN NOTE
Admission glucose 794, pH 7.2  lactic acid elevated at 9.9, no elevation in anion gap   Admitted to the ICU for insulin gtt and fluids   Insulin gtt discontinued on 10/23/24 as blood glucose well controlled, patient transitioned to [facility] level of care on 10/24/24

## 2024-10-26 NOTE — PLAN OF CARE
Problem: Potential for Falls  Goal: Patient will remain free of falls  Description: INTERVENTIONS:  - Educate patient/family on patient safety including physical limitations  - Instruct patient to call for assistance with activity   - Consult OT/PT to assist with strengthening/mobility   - Keep Call bell within reach  - Keep bed low and locked with side rails adjusted as appropriate  - Keep care items and personal belongings within reach  - Initiate and maintain comfort rounds  - Make Fall Risk Sign visible to staff  - Offer Toileting every  Hours, in advance of need  - Initiate/Maintain alarm  - Obtain necessary fall risk management equipment:   - Apply yellow socks and bracelet for high fall risk patients  - Consider moving patient to room near nurses station  Outcome: Progressing     Problem: PAIN - ADULT  Goal: Verbalizes/displays adequate comfort level or baseline comfort level  Description: Interventions:  - Encourage patient to monitor pain and request assistance  - Assess pain using appropriate pain scale  - Administer analgesics based on type and severity of pain and evaluate response  - Implement non-pharmacological measures as appropriate and evaluate response  - Consider cultural and social influences on pain and pain management  - Notify physician/advanced practitioner if interventions unsuccessful or patient reports new pain  Outcome: Progressing     Problem: INFECTION - ADULT  Goal: Absence or prevention of progression during hospitalization  Description: INTERVENTIONS:  - Assess and monitor for signs and symptoms of infection  - Monitor lab/diagnostic results  - Monitor all insertion sites, i.e. indwelling lines, tubes, and drains  - Monitor endotracheal if appropriate and nasal secretions for changes in amount and color  - [place] appropriate cooling/warming therapies per order  - Administer medications as ordered  - Instruct and encourage patient and family to use good hand hygiene technique  -  Identify and instruct in appropriate isolation precautions for identified infection/condition  Outcome: Progressing  Goal: Absence of fever/infection during neutropenic period  Description: INTERVENTIONS:  - Monitor WBC    Outcome: Progressing     Problem: SAFETY ADULT  Goal: Patient will remain free of falls  Description: INTERVENTIONS:  - Educate patient/family on patient safety including physical limitations  - Instruct patient to call for assistance with activity   - Consult OT/PT to assist with strengthening/mobility   - Keep Call bell within reach  - Keep bed low and locked with side rails adjusted as appropriate  - Keep care items and personal belongings within reach  - Initiate and maintain comfort rounds  - Make Fall Risk Sign visible to staff  - Offer Toileting every  Hours, in advance of need  - Initiate/Maintain alarm  - Obtain necessary fall risk management equipment:   - Apply yellow socks and bracelet for high fall risk patients  - Consider moving patient to room near nurses station  Outcome: Progressing  Goal: Maintain or return to baseline ADL function  Description: INTERVENTIONS:  -  Assess patient's ability to carry out ADLs; assess patient's baseline for ADL function and identify physical deficits which impact ability to perform ADLs (bathing, care of mouth/teeth, toileting, grooming, dressing, etc.)  - Assess/evaluate cause of self-care deficits   - Assess range of motion  - Assess patient's mobility; develop plan if impaired  - Assess patient's need for assistive devices and provide as appropriate  - Encourage maximum independence but intervene and supervise when necessary  - Involve family in performance of ADLs  - Assess for home care needs following discharge   - Consider OT consult to assist with ADL evaluation and planning for discharge  - Provide patient education as appropriate  Outcome: Progressing  Goal: Maintains/Returns to pre admission functional level  Description:  INTERVENTIONS:  - Perform AM-PAC 6 Click Basic Mobility/ Daily Activity assessment daily.  - Set and communicate daily mobility goal to care team and patient/family/caregiver.   - Collaborate with rehabilitation services on mobility goals if consulted  - Perform Range of Motion  times a day.  - Reposition patient every  hours.  - Dangle patient  times a day  - Stand patient  times a day  - Ambulate patient  times a day  - Out of bed to chair  times a day   - Out of bed for meals  times a day  - Out of bed for toileting  - Record patient progress and toleration of activity level   Outcome: Progressing     Problem: DISCHARGE PLANNING  Goal: Discharge to home or other facility with appropriate resources  Description: INTERVENTIONS:  - Identify barriers to discharge w/patient and caregiver  - Arrange for needed discharge resources and transportation as appropriate  - Identify discharge learning needs (meds, wound care, etc.)  - Arrange for interpretive services to assist at discharge as needed  - Refer to Case Management Department for coordinating discharge planning if the patient needs post-hospital services based on physician/advanced practitioner order or complex needs related to functional status, cognitive ability, or social support system  Outcome: Progressing     Problem: Knowledge Deficit  Goal: Patient/family/caregiver demonstrates understanding of disease process, treatment plan, medications, and discharge instructions  Description: Complete learning assessment and assess knowledge base.  Interventions:  - Provide teaching at level of understanding  - Provide teaching via preferred learning methods  Outcome: Progressing     Problem: NEUROSENSORY - ADULT  Goal: Achieves stable or improved neurological status  Description: INTERVENTIONS  - Monitor and report changes in neurological status  - Monitor vital signs such as temperature, blood pressure, glucose, and any other labs ordered   - Initiate measures to  prevent increased intracranial pressure  - Monitor for seizure activity and implement precautions if appropriate      Outcome: Progressing  Goal: Remains free of injury related to seizures activity  Description: INTERVENTIONS  - Maintain airway, patient safety  and administer oxygen as ordered  - Monitor patient for seizure activity, document and report duration and description of seizure to physician/advanced practitioner  - If seizure occurs,  ensure patient safety during seizure  - Reorient patient post seizure  - Seizure pads on all 4 side rails  - Instruct patient/family to notify RN of any seizure activity including if an aura is experienced  - Instruct patient/family to call for assistance with activity based on nursing assessment  - Administer anti-seizure medications if ordered    Outcome: Progressing  Goal: Achieves maximal functionality and self care  Description: INTERVENTIONS  - Monitor swallowing and airway patency with patient fatigue and changes in neurological status  - Encourage and assist patient to increase activity and self care.   - Encourage visually impaired, hearing impaired and aphasic patients to use assistive/communication devices  Outcome: Progressing     Problem: CARDIOVASCULAR - ADULT  Goal: Maintains optimal cardiac output and hemodynamic stability  Description: INTERVENTIONS:  - Monitor I/O, vital signs and rhythm  - Monitor for S/S and trends of decreased cardiac output  - Administer and titrate ordered vasoactive medications to optimize hemodynamic stability  - Assess quality of pulses, skin color and temperature  - Assess for signs of decreased coronary artery perfusion  - Instruct patient to report change in severity of symptoms  Outcome: Progressing  Goal: Absence of cardiac dysrhythmias or at baseline rhythm  Description: INTERVENTIONS:  - Continuous cardiac monitoring, vital signs, obtain 12 lead EKG if ordered  - Administer antiarrhythmic and heart rate control medications as  ordered  - Monitor electrolytes and administer replacement therapy as ordered  Outcome: Progressing     Problem: GASTROINTESTINAL - ADULT  Goal: Minimal or absence of nausea and/or vomiting  Description: INTERVENTIONS:  - Administer IV fluids if ordered to ensure adequate hydration  - Maintain NPO status until nausea and vomiting are resolved  - Nasogastric tube if ordered  - Administer ordered antiemetic medications as needed  - Provide nonpharmacologic comfort measures as appropriate  - Advance diet as tolerated, if ordered  - Consider nutrition services referral to assist patient with adequate nutrition and appropriate food choices  Outcome: Progressing  Goal: Maintains or returns to baseline bowel function  Description: INTERVENTIONS:  - Assess bowel function  - Encourage oral fluids to ensure adequate hydration  - Administer IV fluids if ordered to ensure adequate hydration  - Administer ordered medications as needed  - Encourage mobilization and activity  - Consider nutritional services referral to assist patient with adequate nutrition and appropriate food choices  Outcome: Progressing  Goal: Maintains adequate nutritional intake  Description: INTERVENTIONS:  - Monitor percentage of each meal consumed  - Identify factors contributing to decreased intake, treat as appropriate  - Assist with meals as needed  - Monitor I&O, weight, and lab values if indicated  - Obtain nutrition services referral as needed  Outcome: Progressing  Goal: Oral mucous membranes remain intact  Description: INTERVENTIONS  - Assess oral mucosa and hygiene practices  - Implement preventative oral hygiene regimen  - Implement oral medicated treatments as ordered  - Initiate Nutrition services referral as needed  Outcome: Progressing     Problem: GENITOURINARY - ADULT  Goal: Maintains or returns to baseline urinary function  Description: INTERVENTIONS:  - Assess urinary function  - Encourage oral fluids to ensure adequate hydration if  ordered  - Administer IV fluids as ordered to ensure adequate hydration  - Administer ordered medications as needed  - Offer frequent toileting  - Follow urinary retention protocol if ordered  Outcome: Progressing  Goal: Absence of urinary retention  Description: INTERVENTIONS:  - Assess patient’s ability to void and empty bladder  - Monitor I/O  - Bladder scan as needed  - Discuss with physician/AP medications to alleviate retention as needed  - Discuss catheterization for long term situations as appropriate  Outcome: Progressing     Problem: METABOLIC, FLUID AND ELECTROLYTES - ADULT  Goal: Electrolytes maintained within normal limits  Description: INTERVENTIONS:  - Monitor labs and assess patient for signs and symptoms of electrolyte imbalances  - Administer electrolyte replacement as ordered  - Monitor response to electrolyte replacements, including repeat lab results as appropriate  - Instruct patient on fluid and nutrition as appropriate  Outcome: Progressing  Goal: Fluid balance maintained  Description: INTERVENTIONS:  - Monitor labs   - Monitor I/O and WT  - Instruct patient on fluid and nutrition as appropriate  - Assess for signs & symptoms of volume excess or deficit  Outcome: Progressing  Goal: Glucose maintained within target range  Description: INTERVENTIONS:  - Monitor Blood Glucose as ordered  - Assess for signs and symptoms of hyperglycemia and hypoglycemia  - Administer ordered medications to maintain glucose within target range  - Assess nutritional intake and initiate nutrition service referral as needed  Outcome: Progressing     Problem: SKIN/TISSUE INTEGRITY - ADULT  Goal: Skin Integrity remains intact(Skin Breakdown Prevention)  Description: Assess:  -Perform Jose assessment every   -Clean and moisturize skin every   -Inspect skin when repositioning, toileting, and assisting with ADLS  -Assess under medical devices such as  every   -Assess extremities for adequate circulation and sensation      Bed Management:  -Have minimal linens on bed & keep smooth, unwrinkled  -Change linens as needed when moist or perspiring  -Avoid sitting or lying in one position for more than  hours while in bed  -Keep HOB at degrees     Toileting:  -Offer bedside commode  -Assess for incontinence every   -Use incontinent care products after each incontinent episode such as     Activity:  -Mobilize patient  times a day  -Encourage activity and walks on unit  -Encourage or provide ROM exercises   -Turn and reposition patient every  Hours  -Use appropriate equipment to lift or move patient in bed  -Instruct/ Assist with weight shifting every  when out of bed in chair  -Consider limitation of chair time  hour intervals    Skin Care:  -Avoid use of baby powder, tape, friction and shearing, hot water or constrictive clothing  -Relieve pressure over bony prominences using   -Do not massage red bony areas    Next Steps:  -Teach patient strategies to minimize risks such as    -Consider consults to  interdisciplinary teams such as   Outcome: Progressing     Problem: HEMATOLOGIC - ADULT  Goal: Maintains hematologic stability  Description: INTERVENTIONS  - Assess for signs and symptoms of bleeding or hemorrhage  - Monitor labs  - Administer supportive blood products/factors as ordered and appropriate  Outcome: Progressing     Problem: MUSCULOSKELETAL - ADULT  Goal: Maintain or return mobility to safest level of function  Description: INTERVENTIONS:  - Assess patient's ability to carry out ADLs; assess patient's baseline for ADL function and identify physical deficits which impact ability to perform ADLs (bathing, care of mouth/teeth, toileting, grooming, dressing, etc.)  - Assess/evaluate cause of self-care deficits   - Assess range of motion  - Assess patient's mobility  - Assess patient's need for assistive devices and provide as appropriate  - Encourage maximum independence but intervene and supervise when necessary  - Involve family  in performance of ADLs  - Assess for home care needs following discharge   - Consider OT consult to assist with ADL evaluation and planning for discharge  - Provide patient education as appropriate  Outcome: Progressing  Goal: Maintain proper alignment of affected body part  Description: INTERVENTIONS:  - Support, maintain and protect limb and body alignment  - Provide patient/ family with appropriate education  Outcome: Progressing     Problem: Prexisting or High Potential for Compromised Skin Integrity  Goal: Skin integrity is maintained or improved  Description: INTERVENTIONS:  - Identify patients at risk for skin breakdown  - Assess and monitor skin integrity  - Assess and monitor nutrition and hydration status  - Monitor labs   - Assess for incontinence   - Turn and reposition patient  - Assist with mobility/ambulation  - Relieve pressure over bony prominences  - Avoid friction and shearing  - Provide appropriate hygiene as needed including keeping skin clean and dry  - Evaluate need for skin moisturizer/barrier cream  - Collaborate with interdisciplinary team   - Patient/family teaching  - Consider wound care consult   Outcome: Progressing     Problem: Nutrition/Hydration-ADULT  Goal: Nutrient/Hydration intake appropriate for improving, restoring or maintaining nutritional needs  Description: Monitor and assess patient's nutrition/hydration status for malnutrition. Collaborate with interdisciplinary team and initiate plan and interventions as ordered.  Monitor patient's weight and dietary intake as ordered or per policy. Utilize nutrition screening tool and intervene as necessary. Determine patient's food preferences and provide high-protein, high-caloric foods as appropriate.     INTERVENTIONS:  - Monitor oral intake, urinary output, labs, and treatment plans  - Assess nutrition and hydration status and recommend course of action  - Evaluate amount of meals eaten  - Assist patient with eating if necessary   -  Allow adequate time for meals  - Recommend/ encourage appropriate diets, oral nutritional supplements, and vitamin/mineral supplements  - Order, calculate, and assess calorie counts as needed  - Recommend, monitor, and adjust tube feedings and TPN/PPN based on assessed needs  - Assess need for intravenous fluids  - Provide specific nutrition/hydration education as appropriate  - Include patient/family/caregiver in decisions related to nutrition  Outcome: Progressing

## 2024-10-28 ENCOUNTER — TRANSITIONAL CARE MANAGEMENT (OUTPATIENT)
Dept: FAMILY MEDICINE CLINIC | Facility: CLINIC | Age: 54
End: 2024-10-28

## 2024-10-28 RX ORDER — INSULIN HUMAN 100 [IU]/ML
5 INJECTION, SUSPENSION SUBCUTANEOUS
Qty: 10 ML | Refills: 0 | Status: SHIPPED | OUTPATIENT
Start: 2024-10-28

## 2024-10-28 NOTE — QUICK NOTE
Insurance did not cover Novolin insulin      I sent a new prescription for Humulin 70/30  5 units BID with meals.

## 2024-10-28 NOTE — UTILIZATION REVIEW
NOTIFICATION OF ADMISSION DISCHARGE   This is a Notification of Discharge from [facility]. Please be advised that this patient has been discharge from our facility. Below you will find the admission and discharge date and time including the patient’s disposition.   UTILIZATION REVIEW CONTACT:  Katie Almeida MA  Utilization   Network Utilization Review Department  Phone: [phone] x carefully listen to the prompts. All voicemails are confidential.  Email: [email or web address]     ADMISSION INFORMATION  PRESENTATION DATE: 10/22/2024  2:47 PM  OBERVATION ADMISSION DATE: N/A  INPATIENT ADMISSION DATE: 10/22/24  4:30 PM   DISCHARGE DATE: 10/26/2024  2:00 PM   DISPOSITION:Home/Self Care    Network Utilization Review Department  ATTENTION: Please call with any questions or concerns to [phone] and carefully listen to the prompts so that you are directed to the right person. All voicemails are confidential.   For Discharge needs, contact Care Management DC Support Team at [phone] opt. 2  Send all requests for admission clinical reviews, approved or denied determinations and any other requests to dedicated fax number below belonging to the campus where the patient is receiving treatment. List of dedicated fax numbers for the Facilities:  FACILITY NAME UR FAX NUMBER   ADMISSION DENIALS (Administrative/Medical Necessity) [phone]   DISCHARGE SUPPORT TEAM ([facility]) [phone]   PARENT CHILD HEALTH (Maternity/NICU/Pediatrics) [phone]   [facility] [phone]   General acute hospital [phone]   [facility] [phone]   [facility] [phone]   [facility] [phone]   [facility] [phone]   [facility] [phone]   [facility]  [phone]   [facility] [phone]   [facility] [phone]   [facility] [phone]   [facility] [phone]

## 2025-02-05 ENCOUNTER — HOSPITAL ENCOUNTER (INPATIENT)
Facility: HOSPITAL | Age: 55
LOS: 6 days | Discharge: LEFT BEFORE DISCHARGE | DRG: 420 | End: 2025-02-11
Attending: EMERGENCY MEDICINE | Admitting: STUDENT IN AN ORGANIZED HEALTH CARE EDUCATION/TRAINING PROGRAM
Payer: COMMERCIAL

## 2025-02-05 DIAGNOSIS — K86.0 ALCOHOL-INDUCED CHRONIC PANCREATITIS (HCC): ICD-10-CM

## 2025-02-05 DIAGNOSIS — R63.4 UNINTENTIONAL WEIGHT LOSS: ICD-10-CM

## 2025-02-05 DIAGNOSIS — E11.01 HHNC (HYPERGLYCEMIC HYPEROSMOLAR NONKETOTIC COMA) (HCC): ICD-10-CM

## 2025-02-05 DIAGNOSIS — R10.9 ABDOMINAL PAIN: ICD-10-CM

## 2025-02-05 DIAGNOSIS — R73.9 HYPERGLYCEMIA: Primary | ICD-10-CM

## 2025-02-05 PROBLEM — N18.32 CKD STAGE 3B, GFR 30-44 ML/MIN (HCC): Status: ACTIVE | Noted: 2025-02-05

## 2025-02-05 PROBLEM — G93.41 ACUTE METABOLIC ENCEPHALOPATHY: Status: ACTIVE | Noted: 2025-02-05

## 2025-02-05 LAB
2HR DELTA HS TROPONIN: 2 NG/L
ALBUMIN SERPL BCG-MCNC: 3.1 G/DL (ref 3.5–5)
ALP SERPL-CCNC: 325 U/L (ref 34–104)
ALT SERPL W P-5'-P-CCNC: 94 U/L (ref 7–52)
ANION GAP SERPL CALCULATED.3IONS-SCNC: 8 MMOL/L (ref 4–13)
APAP SERPL-MCNC: <2 UG/ML (ref 10–20)
AST SERPL W P-5'-P-CCNC: 60 U/L (ref 13–39)
ATRIAL RATE: 83 BPM
B-OH-BUTYR SERPL-MCNC: <0.05 MMOL/L (ref 0.02–0.27)
BACTERIA UR QL AUTO: NORMAL /HPF
BASE EX.OXY STD BLDV CALC-SCNC: 71.6 % (ref 60–80)
BASE EXCESS BLDV CALC-SCNC: -3.3 MMOL/L
BASOPHILS # BLD AUTO: 0.03 THOUSANDS/ΜL (ref 0–0.1)
BASOPHILS NFR BLD AUTO: 0 % (ref 0–1)
BILIRUB DIRECT SERPL-MCNC: 0.14 MG/DL (ref 0–0.2)
BILIRUB SERPL-MCNC: 0.51 MG/DL (ref 0.2–1)
BILIRUB UR QL STRIP: NEGATIVE
BUN SERPL-MCNC: 10 MG/DL (ref 5–25)
CALCIUM SERPL-MCNC: 8.4 MG/DL (ref 8.4–10.2)
CARDIAC TROPONIN I PNL SERPL HS: 18 NG/L (ref ?–50)
CARDIAC TROPONIN I PNL SERPL HS: 20 NG/L (ref ?–50)
CHLORIDE SERPL-SCNC: 90 MMOL/L (ref 96–108)
CLARITY UR: CLEAR
CO2 SERPL-SCNC: 25 MMOL/L (ref 21–32)
COLOR UR: YELLOW
CREAT SERPL-MCNC: 1.54 MG/DL (ref 0.6–1.3)
EOSINOPHIL # BLD AUTO: 0.27 THOUSAND/ΜL (ref 0–0.61)
EOSINOPHIL NFR BLD AUTO: 3 % (ref 0–6)
ERYTHROCYTE [DISTWIDTH] IN BLOOD BY AUTOMATED COUNT: 11.1 % (ref 11.6–15.1)
ETHANOL SERPL-MCNC: <10 MG/DL
FLUAV AG UPPER RESP QL IA.RAPID: NEGATIVE
FLUBV AG UPPER RESP QL IA.RAPID: NEGATIVE
GFR SERPL CREATININE-BSD FRML MDRD: 50 ML/MIN/1.73SQ M
GLUCOSE SERPL-MCNC: 485 MG/DL (ref 65–140)
GLUCOSE SERPL-MCNC: 918 MG/DL (ref 65–140)
GLUCOSE SERPL-MCNC: >600 MG/DL (ref 65–140)
GLUCOSE SERPL-MCNC: >600 MG/DL (ref 65–140)
GLUCOSE UR STRIP-MCNC: ABNORMAL MG/DL
HCO3 BLDV-SCNC: 23.4 MMOL/L (ref 24–30)
HCT VFR BLD AUTO: 30.4 % (ref 36.5–49.3)
HGB BLD-MCNC: 11.1 G/DL (ref 12–17)
HGB UR QL STRIP.AUTO: ABNORMAL
IMM GRANULOCYTES # BLD AUTO: 0.06 THOUSAND/UL (ref 0–0.2)
IMM GRANULOCYTES NFR BLD AUTO: 1 % (ref 0–2)
KETONES UR STRIP-MCNC: NEGATIVE MG/DL
LEUKOCYTE ESTERASE UR QL STRIP: ABNORMAL
LYMPHOCYTES # BLD AUTO: 2.05 THOUSANDS/ΜL (ref 0.6–4.47)
LYMPHOCYTES NFR BLD AUTO: 24 % (ref 14–44)
MAGNESIUM SERPL-MCNC: 1.8 MG/DL (ref 1.9–2.7)
MCH RBC QN AUTO: 31.9 PG (ref 26.8–34.3)
MCHC RBC AUTO-ENTMCNC: 36.5 G/DL (ref 31.4–37.4)
MCV RBC AUTO: 87 FL (ref 82–98)
MONOCYTES # BLD AUTO: 0.71 THOUSAND/ΜL (ref 0.17–1.22)
MONOCYTES NFR BLD AUTO: 9 % (ref 4–12)
NEUTROPHILS # BLD AUTO: 5.27 THOUSANDS/ΜL (ref 1.85–7.62)
NEUTS SEG NFR BLD AUTO: 63 % (ref 43–75)
NITRITE UR QL STRIP: NEGATIVE
NON-SQ EPI CELLS URNS QL MICRO: NORMAL /HPF
NRBC BLD AUTO-RTO: 0 /100 WBCS
O2 CT BLDV-SCNC: 12.4 ML/DL
P AXIS: 70 DEGREES
PCO2 BLDV: 49.1 MM HG (ref 42–50)
PH BLDV: 7.3 [PH] (ref 7.3–7.4)
PH UR STRIP.AUTO: 6 [PH] (ref 4.5–8)
PLATELET # BLD AUTO: 226 THOUSANDS/UL (ref 149–390)
PMV BLD AUTO: 10.9 FL (ref 8.9–12.7)
PO2 BLDV: 37.2 MM HG (ref 35–45)
POTASSIUM SERPL-SCNC: 4 MMOL/L (ref 3.5–5.3)
PR INTERVAL: 132 MS
PROT SERPL-MCNC: 6.1 G/DL (ref 6.4–8.4)
PROT UR STRIP-MCNC: ABNORMAL MG/DL
QRS AXIS: 47 DEGREES
QRSD INTERVAL: 82 MS
QT INTERVAL: 380 MS
QTC INTERVAL: 446 MS
RBC # BLD AUTO: 3.48 MILLION/UL (ref 3.88–5.62)
RBC #/AREA URNS AUTO: NORMAL /HPF
SALICYLATES SERPL-MCNC: <5 MG/DL (ref 3–20)
SARS-COV+SARS-COV-2 AG RESP QL IA.RAPID: NEGATIVE
SODIUM SERPL-SCNC: 123 MMOL/L (ref 135–147)
SP GR UR STRIP.AUTO: 1.01 (ref 1–1.03)
T WAVE AXIS: 1 DEGREES
UROBILINOGEN UR QL STRIP.AUTO: 0.2 E.U./DL
VENTRICULAR RATE: 83 BPM
WBC # BLD AUTO: 8.39 THOUSAND/UL (ref 4.31–10.16)
WBC #/AREA URNS AUTO: NORMAL /HPF

## 2025-02-05 PROCEDURE — 83735 ASSAY OF MAGNESIUM: CPT | Performed by: EMERGENCY MEDICINE

## 2025-02-05 PROCEDURE — 84484 ASSAY OF TROPONIN QUANT: CPT | Performed by: EMERGENCY MEDICINE

## 2025-02-05 PROCEDURE — 93010 ELECTROCARDIOGRAM REPORT: CPT | Performed by: INTERNAL MEDICINE

## 2025-02-05 PROCEDURE — 82948 REAGENT STRIP/BLOOD GLUCOSE: CPT

## 2025-02-05 PROCEDURE — 82010 KETONE BODYS QUAN: CPT | Performed by: EMERGENCY MEDICINE

## 2025-02-05 PROCEDURE — 96365 THER/PROPH/DIAG IV INF INIT: CPT

## 2025-02-05 PROCEDURE — 80179 DRUG ASSAY SALICYLATE: CPT

## 2025-02-05 PROCEDURE — 96361 HYDRATE IV INFUSION ADD-ON: CPT

## 2025-02-05 PROCEDURE — 82077 ASSAY SPEC XCP UR&BREATH IA: CPT

## 2025-02-05 PROCEDURE — 99285 EMERGENCY DEPT VISIT HI MDM: CPT

## 2025-02-05 PROCEDURE — 87811 SARS-COV-2 COVID19 W/OPTIC: CPT | Performed by: EMERGENCY MEDICINE

## 2025-02-05 PROCEDURE — 99223 1ST HOSP IP/OBS HIGH 75: CPT

## 2025-02-05 PROCEDURE — 85025 COMPLETE CBC W/AUTO DIFF WBC: CPT | Performed by: EMERGENCY MEDICINE

## 2025-02-05 PROCEDURE — 82805 BLOOD GASES W/O2 SATURATION: CPT | Performed by: EMERGENCY MEDICINE

## 2025-02-05 PROCEDURE — 83036 HEMOGLOBIN GLYCOSYLATED A1C: CPT

## 2025-02-05 PROCEDURE — 36415 COLL VENOUS BLD VENIPUNCTURE: CPT | Performed by: EMERGENCY MEDICINE

## 2025-02-05 PROCEDURE — 80143 DRUG ASSAY ACETAMINOPHEN: CPT

## 2025-02-05 PROCEDURE — 80307 DRUG TEST PRSMV CHEM ANLYZR: CPT

## 2025-02-05 PROCEDURE — 80048 BASIC METABOLIC PNL TOTAL CA: CPT | Performed by: EMERGENCY MEDICINE

## 2025-02-05 PROCEDURE — 81001 URINALYSIS AUTO W/SCOPE: CPT

## 2025-02-05 PROCEDURE — 93005 ELECTROCARDIOGRAM TRACING: CPT

## 2025-02-05 PROCEDURE — 87804 INFLUENZA ASSAY W/OPTIC: CPT | Performed by: EMERGENCY MEDICINE

## 2025-02-05 PROCEDURE — 99291 CRITICAL CARE FIRST HOUR: CPT | Performed by: EMERGENCY MEDICINE

## 2025-02-05 PROCEDURE — 80076 HEPATIC FUNCTION PANEL: CPT | Performed by: EMERGENCY MEDICINE

## 2025-02-05 RX ORDER — SODIUM CHLORIDE, SODIUM LACTATE, POTASSIUM CHLORIDE, CALCIUM CHLORIDE 600; 310; 30; 20 MG/100ML; MG/100ML; MG/100ML; MG/100ML
125 INJECTION, SOLUTION INTRAVENOUS CONTINUOUS
Status: DISCONTINUED | OUTPATIENT
Start: 2025-02-05 | End: 2025-02-06

## 2025-02-05 RX ORDER — HYDRALAZINE HYDROCHLORIDE 20 MG/ML
10 INJECTION INTRAMUSCULAR; INTRAVENOUS EVERY 6 HOURS PRN
Status: DISCONTINUED | OUTPATIENT
Start: 2025-02-05 | End: 2025-02-11 | Stop reason: HOSPADM

## 2025-02-05 RX ORDER — MAGNESIUM SULFATE HEPTAHYDRATE 40 MG/ML
2 INJECTION, SOLUTION INTRAVENOUS ONCE
Status: COMPLETED | OUTPATIENT
Start: 2025-02-05 | End: 2025-02-06

## 2025-02-05 RX ORDER — AMLODIPINE BESYLATE 5 MG/1
5 TABLET ORAL 2 TIMES DAILY
Status: DISCONTINUED | OUTPATIENT
Start: 2025-02-06 | End: 2025-02-11 | Stop reason: HOSPADM

## 2025-02-05 RX ORDER — HYDRALAZINE HYDROCHLORIDE 25 MG/1
25 TABLET, FILM COATED ORAL EVERY 8 HOURS SCHEDULED
Status: DISCONTINUED | OUTPATIENT
Start: 2025-02-05 | End: 2025-02-11 | Stop reason: HOSPADM

## 2025-02-05 RX ORDER — LANOLIN ALCOHOL/MO/W.PET/CERES
100 CREAM (GRAM) TOPICAL DAILY
Status: DISCONTINUED | OUTPATIENT
Start: 2025-02-06 | End: 2025-02-11 | Stop reason: HOSPADM

## 2025-02-05 RX ORDER — FOLIC ACID 1 MG/1
1 TABLET ORAL DAILY
Status: DISCONTINUED | OUTPATIENT
Start: 2025-02-06 | End: 2025-02-11 | Stop reason: HOSPADM

## 2025-02-05 RX ADMIN — HYDRALAZINE HYDROCHLORIDE 25 MG: 25 TABLET ORAL at 23:04

## 2025-02-05 RX ADMIN — SODIUM CHLORIDE, SODIUM LACTATE, POTASSIUM CHLORIDE, AND CALCIUM CHLORIDE 125 ML/HR: .6; .31; .03; .02 INJECTION, SOLUTION INTRAVENOUS at 23:28

## 2025-02-05 RX ADMIN — SODIUM CHLORIDE 12 UNITS/HR: 9 INJECTION, SOLUTION INTRAVENOUS at 22:13

## 2025-02-05 RX ADMIN — SODIUM CHLORIDE 1000 ML: 0.9 INJECTION, SOLUTION INTRAVENOUS at 20:07

## 2025-02-05 RX ADMIN — MAGNESIUM SULFATE HEPTAHYDRATE 2 G: 40 INJECTION, SOLUTION INTRAVENOUS at 23:28

## 2025-02-06 ENCOUNTER — APPOINTMENT (INPATIENT)
Dept: CT IMAGING | Facility: HOSPITAL | Age: 55
DRG: 420 | End: 2025-02-06
Payer: COMMERCIAL

## 2025-02-06 LAB
4HR DELTA HS TROPONIN: 5 NG/L
ALBUMIN SERPL BCG-MCNC: 2.6 G/DL (ref 3.5–5)
ALP SERPL-CCNC: 233 U/L (ref 34–104)
ALT SERPL W P-5'-P-CCNC: 69 U/L (ref 7–52)
AMPHETAMINES SERPL QL SCN: NEGATIVE
ANION GAP SERPL CALCULATED.3IONS-SCNC: 7 MMOL/L (ref 4–13)
AST SERPL W P-5'-P-CCNC: 39 U/L (ref 13–39)
ATRIAL RATE: 93 BPM
BARBITURATES UR QL: NEGATIVE
BASOPHILS # BLD AUTO: 0.04 THOUSANDS/ΜL (ref 0–0.1)
BASOPHILS NFR BLD AUTO: 0 % (ref 0–1)
BENZODIAZ UR QL: NEGATIVE
BILIRUB SERPL-MCNC: 0.4 MG/DL (ref 0.2–1)
BUN SERPL-MCNC: 10 MG/DL (ref 5–25)
CALCIUM ALBUM COR SERPL-MCNC: 9.4 MG/DL (ref 8.3–10.1)
CALCIUM SERPL-MCNC: 8.3 MG/DL (ref 8.4–10.2)
CARDIAC TROPONIN I PNL SERPL HS: 23 NG/L (ref ?–50)
CHLORIDE SERPL-SCNC: 102 MMOL/L (ref 96–108)
CO2 SERPL-SCNC: 25 MMOL/L (ref 21–32)
COCAINE UR QL: NEGATIVE
CREAT SERPL-MCNC: 1.37 MG/DL (ref 0.6–1.3)
EOSINOPHIL # BLD AUTO: 0.22 THOUSAND/ΜL (ref 0–0.61)
EOSINOPHIL NFR BLD AUTO: 1 % (ref 0–6)
ERYTHROCYTE [DISTWIDTH] IN BLOOD BY AUTOMATED COUNT: 10.9 % (ref 11.6–15.1)
EST. AVERAGE GLUCOSE BLD GHB EST-MCNC: 235 MG/DL
FENTANYL UR QL SCN: NEGATIVE
GFR SERPL CREATININE-BSD FRML MDRD: 58 ML/MIN/1.73SQ M
GLUCOSE SERPL-MCNC: 102 MG/DL (ref 65–140)
GLUCOSE SERPL-MCNC: 110 MG/DL (ref 65–140)
GLUCOSE SERPL-MCNC: 157 MG/DL (ref 65–140)
GLUCOSE SERPL-MCNC: 172 MG/DL (ref 65–140)
GLUCOSE SERPL-MCNC: 172 MG/DL (ref 65–140)
GLUCOSE SERPL-MCNC: 234 MG/DL (ref 65–140)
GLUCOSE SERPL-MCNC: 260 MG/DL (ref 65–140)
GLUCOSE SERPL-MCNC: 375 MG/DL (ref 65–140)
GLUCOSE SERPL-MCNC: 46 MG/DL (ref 65–140)
HBA1C MFR BLD: 9.8 %
HCT VFR BLD AUTO: 28 % (ref 36.5–49.3)
HGB BLD-MCNC: 10.6 G/DL (ref 12–17)
HYDROCODONE UR QL SCN: NEGATIVE
IMM GRANULOCYTES # BLD AUTO: 0.11 THOUSAND/UL (ref 0–0.2)
IMM GRANULOCYTES NFR BLD AUTO: 1 % (ref 0–2)
LIPASE SERPL-CCNC: 6 U/L (ref 11–82)
LYMPHOCYTES # BLD AUTO: 2.06 THOUSANDS/ΜL (ref 0.6–4.47)
LYMPHOCYTES NFR BLD AUTO: 12 % (ref 14–44)
MAGNESIUM SERPL-MCNC: 1.9 MG/DL (ref 1.9–2.7)
MCH RBC QN AUTO: 32.2 PG (ref 26.8–34.3)
MCHC RBC AUTO-ENTMCNC: 37.9 G/DL (ref 31.4–37.4)
MCV RBC AUTO: 85 FL (ref 82–98)
METHADONE UR QL: NEGATIVE
MONOCYTES # BLD AUTO: 1.41 THOUSAND/ΜL (ref 0.17–1.22)
MONOCYTES NFR BLD AUTO: 8 % (ref 4–12)
NEUTROPHILS # BLD AUTO: 13.46 THOUSANDS/ΜL (ref 1.85–7.62)
NEUTS SEG NFR BLD AUTO: 78 % (ref 43–75)
NRBC BLD AUTO-RTO: 0 /100 WBCS
OPIATES UR QL SCN: NEGATIVE
OXYCODONE+OXYMORPHONE UR QL SCN: NEGATIVE
P AXIS: 75 DEGREES
PCP UR QL: NEGATIVE
PLATELET # BLD AUTO: 242 THOUSANDS/UL (ref 149–390)
PMV BLD AUTO: 11.1 FL (ref 8.9–12.7)
POTASSIUM SERPL-SCNC: 3.5 MMOL/L (ref 3.5–5.3)
PR INTERVAL: 126 MS
PROT SERPL-MCNC: 5 G/DL (ref 6.4–8.4)
QRS AXIS: 43 DEGREES
QRSD INTERVAL: 84 MS
QT INTERVAL: 388 MS
QTC INTERVAL: 483 MS
RBC # BLD AUTO: 3.29 MILLION/UL (ref 3.88–5.62)
SODIUM SERPL-SCNC: 134 MMOL/L (ref 135–147)
T WAVE AXIS: 16 DEGREES
THC UR QL: NEGATIVE
VENTRICULAR RATE: 93 BPM
WBC # BLD AUTO: 17.3 THOUSAND/UL (ref 4.31–10.16)

## 2025-02-06 PROCEDURE — 82948 REAGENT STRIP/BLOOD GLUCOSE: CPT

## 2025-02-06 PROCEDURE — 93010 ELECTROCARDIOGRAM REPORT: CPT

## 2025-02-06 PROCEDURE — 83690 ASSAY OF LIPASE: CPT | Performed by: STUDENT IN AN ORGANIZED HEALTH CARE EDUCATION/TRAINING PROGRAM

## 2025-02-06 PROCEDURE — 71250 CT THORAX DX C-: CPT

## 2025-02-06 PROCEDURE — 70450 CT HEAD/BRAIN W/O DYE: CPT

## 2025-02-06 PROCEDURE — 85025 COMPLETE CBC W/AUTO DIFF WBC: CPT

## 2025-02-06 PROCEDURE — 80053 COMPREHEN METABOLIC PANEL: CPT

## 2025-02-06 PROCEDURE — 84484 ASSAY OF TROPONIN QUANT: CPT | Performed by: EMERGENCY MEDICINE

## 2025-02-06 PROCEDURE — 93005 ELECTROCARDIOGRAM TRACING: CPT

## 2025-02-06 PROCEDURE — 74176 CT ABD & PELVIS W/O CONTRAST: CPT

## 2025-02-06 PROCEDURE — 83735 ASSAY OF MAGNESIUM: CPT

## 2025-02-06 PROCEDURE — 99232 SBSQ HOSP IP/OBS MODERATE 35: CPT | Performed by: STUDENT IN AN ORGANIZED HEALTH CARE EDUCATION/TRAINING PROGRAM

## 2025-02-06 RX ORDER — INSULIN LISPRO 100 [IU]/ML
1-5 INJECTION, SOLUTION INTRAVENOUS; SUBCUTANEOUS
Status: DISCONTINUED | OUTPATIENT
Start: 2025-02-06 | End: 2025-02-11 | Stop reason: HOSPADM

## 2025-02-06 RX ORDER — ACETAMINOPHEN 325 MG/1
650 TABLET ORAL EVERY 6 HOURS PRN
Status: DISCONTINUED | OUTPATIENT
Start: 2025-02-06 | End: 2025-02-11 | Stop reason: HOSPADM

## 2025-02-06 RX ORDER — DEXTROSE MONOHYDRATE 25 G/50ML
INJECTION, SOLUTION INTRAVENOUS
Status: COMPLETED
Start: 2025-02-06 | End: 2025-02-06

## 2025-02-06 RX ORDER — OXYCODONE HYDROCHLORIDE 5 MG/1
5 TABLET ORAL ONCE AS NEEDED
Refills: 0 | Status: COMPLETED | OUTPATIENT
Start: 2025-02-06 | End: 2025-02-06

## 2025-02-06 RX ORDER — LABETALOL HYDROCHLORIDE 5 MG/ML
10 INJECTION, SOLUTION INTRAVENOUS EVERY 6 HOURS PRN
Status: DISCONTINUED | OUTPATIENT
Start: 2025-02-06 | End: 2025-02-11 | Stop reason: HOSPADM

## 2025-02-06 RX ORDER — INSULIN ASPART 100 [IU]/ML
5 INJECTION, SUSPENSION SUBCUTANEOUS
Status: DISCONTINUED | OUTPATIENT
Start: 2025-02-06 | End: 2025-02-07

## 2025-02-06 RX ADMIN — INSULIN LISPRO 1 UNITS: 100 INJECTION, SOLUTION INTRAVENOUS; SUBCUTANEOUS at 21:19

## 2025-02-06 RX ADMIN — HYDRALAZINE HYDROCHLORIDE 25 MG: 25 TABLET ORAL at 21:19

## 2025-02-06 RX ADMIN — AMLODIPINE BESYLATE 5 MG: 5 TABLET ORAL at 08:06

## 2025-02-06 RX ADMIN — SODIUM CHLORIDE, SODIUM LACTATE, POTASSIUM CHLORIDE, AND CALCIUM CHLORIDE 125 ML/HR: .6; .31; .03; .02 INJECTION, SOLUTION INTRAVENOUS at 08:27

## 2025-02-06 RX ADMIN — INSULIN ASPART 5 UNITS: 100 INJECTION, SUSPENSION SUBCUTANEOUS at 17:04

## 2025-02-06 RX ADMIN — PANCRELIPASE 24000 UNITS: 120000; 24000; 76000 CAPSULE, DELAYED RELEASE PELLETS ORAL at 08:06

## 2025-02-06 RX ADMIN — AMLODIPINE BESYLATE 5 MG: 5 TABLET ORAL at 17:03

## 2025-02-06 RX ADMIN — ACETAMINOPHEN 650 MG: 325 TABLET, FILM COATED ORAL at 08:23

## 2025-02-06 RX ADMIN — Medication 1 TABLET: at 08:06

## 2025-02-06 RX ADMIN — PANCRELIPASE 24000 UNITS: 120000; 24000; 76000 CAPSULE, DELAYED RELEASE PELLETS ORAL at 17:05

## 2025-02-06 RX ADMIN — PANCRELIPASE 24000 UNITS: 120000; 24000; 76000 CAPSULE, DELAYED RELEASE PELLETS ORAL at 12:31

## 2025-02-06 RX ADMIN — FOLIC ACID 1 MG: 1 TABLET ORAL at 08:06

## 2025-02-06 RX ADMIN — OXYCODONE 5 MG: 5 TABLET ORAL at 12:31

## 2025-02-06 RX ADMIN — DEXTROSE MONOHYDRATE 50 ML: 25 INJECTION, SOLUTION INTRAVENOUS at 03:25

## 2025-02-06 RX ADMIN — HYDRALAZINE HYDROCHLORIDE 25 MG: 25 TABLET ORAL at 05:17

## 2025-02-06 RX ADMIN — INSULIN ASPART 5 UNITS: 100 INJECTION, SUSPENSION SUBCUTANEOUS at 08:24

## 2025-02-06 RX ADMIN — INSULIN LISPRO 2 UNITS: 100 INJECTION, SOLUTION INTRAVENOUS; SUBCUTANEOUS at 17:05

## 2025-02-06 RX ADMIN — HYDRALAZINE HYDROCHLORIDE 25 MG: 25 TABLET ORAL at 13:59

## 2025-02-06 RX ADMIN — INSULIN LISPRO 2 UNITS: 100 INJECTION, SOLUTION INTRAVENOUS; SUBCUTANEOUS at 08:05

## 2025-02-06 RX ADMIN — Medication 100 MG: at 08:06

## 2025-02-06 NOTE — CERTIFIED RECOVERY SPECIALIST
Certified  Note    Patient name: Wes Araujo  Location: South 2 /South 2 M*  [place]: [facility]  Attending:  Julio Mcneil MD MRN [id number]  : 1970  Age: 54 y.o.    Sex: male Date 2025         Substance Use History:     Social History     Substance and Sexual Activity   Alcohol Use Yes        Social History     Substance and Sexual Activity   Drug Use Yes    Types: Cocaine     Patient eating lunch - Nurse finishing     CRS will stop back     Resources and conatct info provided         Gwen Limon

## 2025-02-06 NOTE — CASE MANAGEMENT
Case Management Assessment & Discharge Planning Note    Patient name Wes Araujo  Location South 2 /South 2 M* MRN [id number]  : 1970 Date 2025       Current Admission Date: 2025  Current Admission Diagnosis:Hyperosmolar hyperglycemic state (HHS) (HCC)   Patient Active Problem List    Diagnosis Date Noted Date Diagnosed    Acute metabolic encephalopathy 2025     CKD stage 3b, GFR 30-44 ml/min (HCC) 2025     Hyperglycemia without ketosis 10/22/2024     Hypertensive urgency 10/22/2024     Transaminitis 2024     Alcohol-induced chronic pancreatitis (HCC) 2024     Acute low back pain 03/10/2024     SIRS (systemic inflammatory response syndrome) ([facility]) 2024     Hyperosmolar hyperglycemic state (HHS) ([facility]) 2024     Alcohol abuse with history of withdrawal ([facility]) 2024     Elevated brain natriuretic peptide (BNP) level 2024     Type 2 diabetes mellitus (HCC) 2024     Chronic low back pain 2023     Alcohol withdrawal ([facility]) 2023     Diarrhea 2023     Seizure ([facility]) 2022     Illicit drug use 2022     Leukocytosis 2022     Hypothermia 2022     Cocaine use 2022     Abnormal chest x-ray 2022     Bilateral hearing loss 2020     Hypoglycemia 2020     Hypokalemia 2020     Acute renal failure superimposed on stage 3 chronic kidney disease (HCC) 2019     Anemia 2019     Hyponatremia 2019     History of atrial fibrillation 2019     Acute encephalopathy 2019     ZENAIDA (acute kidney injury) (HCC) 2019     Alcohol intoxication in active alcoholic with complication (HCC) 2016     Primary hypertension      Type 2 diabetes mellitus with hypoglycemia, with long-term current use of insulin (HCC)      Uncomplicated alcohol dependence (HCC)      Paroxysmal A-fib (HCC)      Chronic pancreatitis (HCC)      Thrombocytopenia (HCC)        LOS (days):  1  Geometric Mean LOS (GMLOS) (days):   Days to GMLOS:     OBJECTIVE:    Risk of Unplanned Readmission Score: 24.46         Current admission status: Inpatient       Preferred Pharmacy:    [facility] [place] [facility] [address]  [address] [facility] [address]  Phone: [phone] Fax: [phone]    [facility] [place] [address]  [facility],  [address]  [facility],  First Floor [facility] [address]  Phone: [phone] Fax: [phone]    [id number] - [place] - [address] [facility]  [address] [facility] [address]  Phone: [phone] Fax: [phone]    Primary Care Provider: Rush Kebede MD    Primary Insurance: [employer]  Secondary Insurance:     ASSESSMENT:  Active Health Care Proxies       AraujoWes almanza [facility] Representative - Father   Primary Phone: [phone] (Mobile)  Home Phone: [phone]                 Readmission Root Cause  30 Day Readmission: No    Patient Information  Admitted from:: Home  Mental Status: Alert  During Assessment patient was accompanied by: Not accompanied during assessment  Assessment information provided by:: Patient  Primary Caregiver: Self  Support Systems: Self, Parent  County of Residence: [place]  What [facility] do you live in?: [place]  Home entry access options. Select all that apply.: Stairs  Number of steps to enter home.: One Flight  Type of Current Residence: Apartment  Floor Level: 2  Upon entering residence, is there a bedroom on the main floor (no further steps)?: Yes  Upon entering residence, is there a bathroom on the main floor (no further steps)?: Yes  Living Arrangements: Lives w/ Parent(s)    Activities of Daily Living Prior to Admission  Functional Status: Independent  Completes ADLs independently?: Yes  Ambulates independently?: Yes  Does patient use assisted devices?: No  Does patient currently own DME?: No  Does patient have a history of Outpatient Therapy  (PT/OT)?: No  Does the patient have a history of Short-Term Rehab?: No  Does patient have a history of HHC?: No  Does patient currently have HHC?: No         Patient Information Continued  Income Source: Unemployed  Does patient have prescription coverage?: Yes  Does patient receive dialysis treatments?: No  Does patient have a history of substance abuse?: Yes  Historical substance use preference: Alcohol/ETOH  History of Withdrawal Symptoms:  (Pt stated he drinks a lot daily however he can stop whenever he wants to.)  Is patient currently in treatment for substance abuse?: No. Patient declined treatment information.  Does patient have a history of Mental Health Diagnosis?: No         Means of Transportation  Means of Transport to Appts:: Family transport          DISCHARGE DETAILS:    Discharge planning discussed with:: Patient  Freedom of Choice: Yes     CM contacted family/caregiver?: No- see comments  Were Treatment Team discharge recommendations reviewed with patient/caregiver?: Yes  Did patient/caregiver verbalize understanding of patient care needs?: Yes  Were patient/caregiver advised of the risks associated with not following Treatment Team discharge recommendations?: Yes    Contacts  Patient Contacts: Wes Araujo  Relationship to Patient:: Family  Contact Method: Phone  Phone Number: [phone]  Reason/Outcome: Emergency Contact    Requested Home Health Care         Is the patient interested in HHC at discharge?: No    DME Referral Provided  Referral made for DME?: No         Discharge Destination Plan:: Home         Additional Comments: CM m/w the pt to complete an assessment and review CM role.  Pt's primary language is Anguillan. Refused to used IPad. Requested hospital .  Pt lives with his parents in a 2nd floor apartment with a flight of steps to the apartment.  Pt is independent with all ADLs. Pt stated he does have difficulty climbing the steps into the apartment.  Pt does not  use/own any DME.  Pt does drink ETOH. Pt stated he drinks a lot daily. Reviewed HOST referral.  Pt refused stating he can stop drinking ETOH whenever he wants.  Pt did state he is [place] ans asked for information on getting a hearing aide. CM will provide information from [facility]'s findhelp.  CM will continue to follow for all discharge needs.

## 2025-02-06 NOTE — ASSESSMENT & PLAN NOTE
Lab Results   Component Value Date    HGBA1C 11.2 (H) 10/23/2024       Recent Labs     02/05/25 2000 02/05/25 2213 02/05/25  2327   POCGLU >600* >600* 485*       Blood Sugar Average: Last 72 hrs:  (P) [id number]  Pt presented to the ED secondary to hyperglycemia and confusion. Per pt family, pt family noted pt was acting confused and unable to get out of bed prompting them to call EMS. Pt does have history of noncompliance.   Pt bg noted to be 918, however without evidence of DKA. No metabolic acidosi. Bicarb 25. Beta hydroxy <0.05. Anion gap 8. UA without ketones.  Insulin gtt initiated in the ED, continue   Transition to home insulin regimen once bg controlled. Home regimen 5 units novolin bid w/ meals.

## 2025-02-06 NOTE — ASSESSMENT & PLAN NOTE
Transaminitis noted  Suspected likely related to reported alcohol abuse  Continue to monitor CMP for improvement

## 2025-02-06 NOTE — UTILIZATION REVIEW
Initial Clinical Review    Admission: Date/Time/Statement:   Admission Orders (From admission, onward)       Ordered        02/05/25 2242  Inpatient Admission  Once                          Orders Placed This Encounter   Procedures    Inpatient Admission     Standing Status:   Standing     Number of Occurrences:   1     Level of Care:   Level 2 Stepdown / HOT [14]     Estimated length of stay:   More than 2 Midnights     Certification:   I certify that inpatient services are medically necessary for this patient for a duration of greater than two midnights. See H&P and MD Progress Notes for additional information about the patient's course of treatment.     ED Arrival Information       Expected   -    Arrival   2/5/2025 19:52    Acuity   Emergent              Means of arrival   Ambulance    Escorted by   [facility] ([facility])    Service   Hospitalist    Admission type   Emergency              Arrival complaint   high blood sugar             Chief Complaint   Patient presents with    Hyperglycemia - Symptomatic     Pt arrived via EMS. EMS states family witnessed pt trying to get into bed, pt fell and could not get up. Pt is known diabetic had a BG of >600 in ambulance. Pt is A&O only to self. Pt is confused during triage does not know what happened.        Initial Presentation: 54 y.o. male presents to the ED via EMS from home with c/o confusion and elevated glucose 918.  PMH: IDDM, ETOH withdrawal seizures, CKD.  In the ED he was HTN.  Treated with IV fluids, Insulin drip, IV Mag, PO Hydralazine.  Labs - glucose 941, elevated LFTs, creat, low , Mag, neg UDS, abnormal UA.  ECG - NSR.  On admission exam, pt feeling improvement, does not recall initial events, notes he drinks heavily and his glucose has been high.  Last drink 3 days PTA.  Admitted to INPATIENT status to Level 2 SD with Hyperosmolar hyperglycemic state, acute metabolic encepahlopathy, HTN urgency, transaminitis, ETOH withdrawal - PLAN:  Insulin drip, transition to home insulin regimen, freq POC glucose, home BP meds - Hydralazine TID and BID Amlodipine, PRN Hydralazine and Labetalol, trend CMP, CIWA, vitamin supplementation.      Anticipated Length of Stay/Certification Statement: Patient will be admitted on an inpatient basis with an anticipated length of stay of greater than 2 midnights secondary to HHS, acute metabolic encephalopathy.     Date: 2/6   Day 2:   Hyperosmolar hyperglycemic state, acute metabolic encepahlopathy, HTN urgency, transaminitis, ETOH withdrawal - insulin drip d/c early AM, BP improving.  CIWA 1. + leukocytosis and increased alk phos today.  Pt c/o abd discomfort.  Pt is on home insulin regimen.  Intermittent HTN.  No deficits on exam today. CT Head, CT CAP pending - no acute findings,  nonspecific enteritis, likely infectious or inflammatory, chronic pancreatitis with chronic pancreatic ductal dilation and intraductal calculi better appreciated on comparison MRI. WBC up to 17.1. downtrending LFTs.        ED Treatment-Medication Administration from 02/05/2025 1951 to 02/05/2025 2356         Date/Time Order Dose Route Action     02/05/2025 2007 sodium chloride 0.9 % bolus 1,000 mL 1,000 mL Intravenous New Bag     02/05/2025 2213 insulin regular (HumuLIN R,NovoLIN R) 1 Units/mL in sodium chloride 0.9 % 100 mL infusion 12 Units/hr Intravenous New Bag     02/05/2025 2328 lactated ringers infusion 125 mL/hr Intravenous New Bag     02/05/2025 2328 magnesium sulfate 2 g/50 mL IVPB (premix) 2 g 2 g Intravenous New Bag     02/05/2025 2304 hydrALAZINE (APRESOLINE) tablet 25 mg 25 mg Oral Given            Scheduled Medications:  amLODIPine, 5 mg, Oral, BID  folic acid, 1 mg, Oral, Daily  hydrALAZINE, 25 mg, Oral, Q8H JAISON  insulin aspart protamine-insulin aspart, 5 Units, Subcutaneous, BID AC  insulin lispro, 1-5 Units, Subcutaneous, TID AC  insulin lispro, 1-5 Units, Subcutaneous, HS  multivitamin-minerals, 1 tablet, Oral,  Daily  pancrelipase (Lip-Prot-Amyl), 24,000 Units, Oral, TID With Meals  thiamine, 100 mg, Oral, Daily      Continuous IV Infusions:  Insulin drip - d/c 2/6 @ 0203  lactated ringers, 125 mL/hr, Intravenous, Continuous      PRN Meds:  acetaminophen, 650 mg, Oral, Q6H PRN - x 1 2/6  hydrALAZINE, 10 mg, Intravenous, Q6H PRN  labetalol, 10 mg, Intravenous, Q6H PRN      ED Triage Vitals   Temperature Pulse Respirations Blood Pressure SpO2 Pain Score   02/05/25 2004 02/05/25 2004 02/05/25 2004 02/05/25 2004 02/05/25 2004 02/06/25 0000   (!) 97.3 °F (36.3 °C) 95 22 (!) 198/98 100 % 6     Weight (last 2 days)       Date/Time Weight    02/06/25 0543 54.2 (119.49)    02/06/25 00:04:57 54.2 (119.49)    02/05/25 2243 54.2 (119.49)    02/05/25 2004 58.3 (128.53)            Vital Signs (last 3 days)       Date/Time Temp Pulse Resp BP MAP (mmHg) SpO2 O2 Device Patient Position - Orthostatic VS [place] Coma Scale Score CIWA-Ar Total Pain    02/06/25 15:30:07 99 °F (37.2 °C) 98 16 139/93 108 97 % -- -- -- -- --    02/06/25 15:08:27 -- 94 -- 141/83 102 98 % -- -- -- 1 --    02/06/25 13:59:32 -- 109 -- 179/108 132 96 % -- -- -- -- --    02/06/25 1359 -- -- -- 179/108 -- -- -- -- -- -- --    02/06/25 1231 -- -- -- -- -- -- -- -- -- -- 8    02/06/25 1200 -- -- -- -- -- -- -- -- 15 -- 8    02/06/25 11:18:48 98.7 °F (37.1 °C) 100 18 148/77 101 99 % None (Room air) Lying -- 1 --    02/06/25 0823 -- -- -- -- -- -- -- -- -- -- 7    02/06/25 0800 -- -- -- -- -- -- -- -- 15 -- 7    02/06/25 07:35:16 98.6 °F (37 °C) 91 18 144/66 92 100 % None (Room air) Lying -- 1 --    02/06/25 05:16:12 -- 82 -- 133/71 92 100 % -- -- -- -- --    02/06/25 0400 -- -- -- 133/71 -- -- -- -- -- 0 --    02/06/25 0025 -- -- -- -- -- -- -- -- -- -- 7 02/06/25 00:04:57 97.5 °F (36.4 °C) 91 16 202/98 133 100 % None (Room air) Lying -- 1 --    02/06/25 0000 -- -- -- -- -- 100 % None (Room air) -- 14 -- 6    02/05/25 2342 -- 87 16 201/102 -- 100 % None (Room air) Lying  -- -- --    02/05/25 2304 -- -- -- 162/109 -- -- -- -- -- -- --    02/05/25 2215 -- 84 18 141/66 105 100 % None (Room air) Lying -- -- --    02/05/25 2210 -- -- -- -- -- -- -- -- 14 -- --    02/05/25 2200 -- 78 16 169/89 142 98 % None (Room air) Lying -- -- --    02/05/25 2145 -- 82 18 186/91 130 99 % None (Room air) Lying -- -- --    02/05/25 2130 -- 74 18 181/91 119 99 % None (Room air) Lying -- -- --    02/05/25 2115 -- 74 16 191/92 127 98 % None (Room air) Lying -- -- --    02/05/25 2100 -- 74 16 192/90 115 100 % None (Room air) Lying -- -- --    02/05/25 2045 -- 76 16 199/92 142 99 % None (Room air) Lying -- -- --    02/05/25 2030 -- 80 20 201/94 128 97 % None (Room air) Lying -- -- --    02/05/25 2014 -- -- -- -- -- -- -- -- 14 -- --    02/05/25 2004 97.3 °F (36.3 °C) 95 22 198/98 -- 100 % None (Room air) Lying -- -- --           CIWA-Ar Score       Row Name 02/06/25 15:08:27 02/06/25 11:18:48 02/06/25 07:35:16       CIWA-Ar    Nausea and Vomiting 0 0 0    Tactile Disturbances 0 0 0    Tremor 1 1 1    Auditory Disturbances 0 0 0    Paroxysmal Sweats 0 0 0    Visual Disturbances 0 0 0    Anxiety 0 0 0    Headache, Fullness in Head 0 0 0    Agitation 0 0 0    Orientation and Clouding of Sensorium 0 0 0    CIWA-Ar Total 1 1 1      Row Name 02/06/25 0400 02/06/25 00:04:57          CIWA-Ar    /71 --     Nausea and Vomiting 0 0     Tactile Disturbances 0 0     Tremor 0 0     Auditory Disturbances 0 0     Paroxysmal Sweats 0 0     Visual Disturbances 0 0     Anxiety 0 0     Headache, Fullness in Head 0 1     Agitation 0 0     Orientation and Clouding of Sensorium 0 0     CIWA-Ar Total 0 1                     Pertinent Labs/Diagnostic Test Results:   Radiology:  CT head wo contrast     No acute disease.       CT chest abdomen pelvis wo contrast       No acute findings.   Nonspecific enteritis, likely infectious or inflammatory.  Chronic pancreatitis with chronic pancreatic ductal dilation and intraductal calculi  better appreciated on comparison MRI.     Cardiology:  ECG 12 lead   Final Result by Ana Paula Valdez MD (02/05 2307)   Age and gender specific ECG analysis    Normal sinus rhythm   Cannot rule out Inferior infarct (cited on or before 05-Feb-2025)   Cannot rule out Anterior infarct (cited on or before 05-Feb-2025)   Abnormal ECG   When compared with ECG of 05-Feb-2025 20:02, (unconfirmed)   Poor data quality in previosu ECG prevents serial comparison   Confirmed by Ana Paula Valdez ([id number]) on 2/5/2025 11:07:52 PM      ECG 12 lead   Final Result by Ana Paula Valdez MD (02/05 2313)   Poor data quality   Probably sinus rhythm   Abnormal ECG   When compared with ECG of 22-Oct-2024 14:54,   Poor data quality in current ECG precludes serial comparison   QT has shortened   Confirmed by Ana Paula Valdez ([id number]) on 2/5/2025 11:13:32 PM        GI:  No orders to display           Results from last 7 days   Lab Units 02/06/25 0639 02/05/25 2009   WBC Thousand/uL 17.30* 8.39   HEMOGLOBIN g/dL 10.6* 11.1*   HEMATOCRIT % 28.0* 30.4*   PLATELETS Thousands/uL 242 226   TOTAL NEUT ABS Thousands/µL 13.46* 5.27         Results from last 7 days   Lab Units 02/06/25 0634 02/06/25 0634 02/05/25 2009   SODIUM mmol/L  --  134* 123*   POTASSIUM mmol/L  --  3.5 4.0   CHLORIDE mmol/L  --  102 90*   CO2 mmol/L  --  25 25   ANION GAP mmol/L  --  7 8   BUN mg/dL  --  10 10   CREATININE mg/dL  --  1.37* 1.54*   EGFR ml/min/1.73sq m  --  58 50   CALCIUM mg/dL  --  8.3* 8.4   MAGNESIUM mg/dL 1.9  --  1.8*     Results from last 7 days   Lab Units 02/06/25 0634 02/05/25 2009   AST U/L 39 60*   ALT U/L 69* 94*   ALK PHOS U/L 233* 325*   TOTAL PROTEIN g/dL 5.0* 6.1*   ALBUMIN g/dL 2.6* 3.1*   TOTAL BILIRUBIN mg/dL 0.40 0.51   BILIRUBIN DIRECT mg/dL  --  0.14     Results from last 7 days   Lab Units 02/06/25  1119 02/06/25  0734 02/06/25  0410 02/06/25  0311 02/06/25  0159 02/06/25  0008 02/05/25  2327 02/05/25  2213 02/05/25 2000   POC GLUCOSE mg/dl 102  234* 172* 46* 110 375* 485* >600* >600*     Results from last 7 days   Lab Units 02/06/25  0634 02/05/25 2009   GLUCOSE RANDOM mg/dL 172* 918*         Results from last 7 days   Lab Units 02/05/25 2009   HEMOGLOBIN A1C % 9.8*   EAG mg/dl 235     Beta- Hydroxybutyrate   Date Value Ref Range Status   02/05/2025 <0.05 0.02 - 0.27 mmol/L Final   10/22/2024 0.08 0.02 - 0.27 mmol/L Final   04/10/2024 <0.05 0.02 - 0.27 mmol/L Final          Results from last 7 days   Lab Units 02/05/25 2009   PH [place]  7.296*   PCO2 [place] mm Hg 49.1   PO2 [place] mm Hg 37.2   HCO3 [place] mmol/L 23.4*   BASE EXC [place] mmol/L -3.3   O2 CONTENT [place] ml/dL 12.4   O2 HGB, VENOUS % 71.6             Results from last 7 days   Lab Units 02/06/25  0051 02/05/25  2211 02/05/25 2009   HS TNI 0HR ng/L  --   --  18   HS TNI 2HR ng/L  --  20  --    HSTNI D2 ng/L  --  2  --    HS TNI 4HR ng/L 23  --   --    HSTNI D4 ng/L 5  --   --            Results from last 7 days   Lab Units 02/05/25 2144   CLARITY UA  Clear   COLOR UA  Yellow   SPEC GRAV UA  1.015   PH UA  6.0   GLUCOSE UA mg/dl >=1000 (1%)*   KETONES UA mg/dl Negative   BLOOD UA  Trace*   PROTEIN UA mg/dl 100 (2+)*   NITRITE UA  Negative   BILIRUBIN UA  Negative   UROBILINOGEN UA E.U./dl 0.2   LEUKOCYTES UA  Elevated glucose may cause decreased leukocyte values. See urine microscopic for UWBC result*   WBC UA /hpf 1-2   RBC UA /hpf 1-2   BACTERIA UA /hpf None Seen   EPITHELIAL CELLS WET PREP /hpf None Seen             Results from last 7 days   Lab Units 02/05/25  2144   AMPH/METH  Negative   BARBITURATE UR  Negative   BENZODIAZEPINE UR  Negative   COCAINE UR  Negative   METHADONE URINE  Negative   OPIATE UR  Negative   PCP UR  Negative   THC UR  Negative     Results from last 7 days   Lab Units 02/05/25  2318   ETHANOL LVL mg/dL <10   ACETAMINOPHEN LVL ug/mL <2*   SALICYLATE LVL mg/dL <5     Past Medical History:   Diagnosis Date    Alcohol abuse     Chronic pancreatitis (HCC)     Diabetes mellitus (HCC)      Type 2    Pancreatitis     Paroxysmal A-fib (HCC)     Thrombocytopenia (HCC)      Present on Admission:   Hyperosmolar hyperglycemic state (HHS) (HCC)   Alcohol abuse with history of withdrawal (HCC)   Hypertensive urgency   Transaminitis      Admitting Diagnosis: Hyperglycemia [R73.9]  HHNC (hyperglycemic hyperosmolar nonketotic coma) (HCC) [E11.01]  Age/Sex: 54 y.o. male    Network Utilization Review Department  ATTENTION: Please call with any questions or concerns to [phone] and carefully listen to the prompts so that you are directed to the right person. All voicemails are confidential.   For Discharge needs, contact Care Management DC Support Team at [phone] opt. [phone]  Send all requests for admission clinical reviews, approved or denied determinations and any other requests to dedicated fax number below belonging to the campus where the patient is receiving treatment. List of dedicated fax numbers for the Facilities:  FACILITY NAME UR FAX NUMBER   ADMISSION DENIALS (Administrative/Medical Necessity) [phone]   DISCHARGE SUPPORT TEAM (NETWORK) [phone]   PARENT CHILD HEALTH (Maternity/NICU/Pediatrics) [phone]   [facility] [phone]   [facility] [phone]   [facility] [phone]   [facility] [phone]   [facility] [phone]   [facility] [phone]   [facility] [phone]   [facility] [phone]   [facility] [phone]   [facility] [phone]   [facility] [phone]   [facility] [phone]

## 2025-02-06 NOTE — DISCHARGE INSTR - OTHER ORDERS
Gwen Limon   Certified     [facility] [place] [facility]  [phone]    [employer] [facility]  [address]  [facility] [address]  [phone]    [facility]  [address] [facility] [address]  [phone]    [facility]   [address] [facility] [address]  [phone]    [facility]   [address] [facility] [address]  [phone]    [facility]  [facility] [place] in [place], [place]  Address: [address] , [place] [address]  Phone: [phone]      [employer] [employer]  Facilities    [email or web address]  Learn more about these services  by contacting [phone]    [place] [facility] (Some [place] speaking staff)   [address], [place] [address]  Phone: [phone]    Wound Care Plan:   1-Right arm--cleanse with normal saline, pat dry.  Apply antibiotic ointment (Bacitracin/Neosporin) and cover with silicone bordered foam dressing or bandaid.  Change dressing daily and as needed.

## 2025-02-06 NOTE — ASSESSMENT & PLAN NOTE
On initial presentation to the ED, pt noted to be confused. Improved possibly due to hypertensive urgency, hyperglycemia, alcohol abuse    CT head, chest abdomen pelvis ordered. Pending

## 2025-02-06 NOTE — ED NOTES
Per [facility] provider, HANDY Manley, This RN is to titrate pt's insulin drip every 2 hours based on pt's blood sugar. Pt is also allowed to have water at this time. Pt provided with ice water. Provider, HANDY Manley, aware.      Hugo De León RN  02/05/25 7690

## 2025-02-06 NOTE — H&P
H&P - Hospitalist   Name: Wse Araujo 54 y.o. male I MRN: [id number]  Unit/Bed#: [place] [address] -01 I Date of Admission: 2/5/2025   Date of Service: 2/6/2025 I Hospital Day: 1     Assessment & Plan  Hyperosmolar hyperglycemic state (HHS) ([facility])  Lab Results   Component Value Date    HGBA1C 11.2 (H) 10/23/2024       Recent Labs     02/05/25 2000 02/05/25 2213 02/05/25  2327   POCGLU >600* >600* 485*       Blood Sugar Average: Last 72 hrs:  (P) [id number]  Pt presented to the ED secondary to hyperglycemia and confusion. Per pt family, pt family noted pt was acting confused and unable to get out of bed prompting them to call EMS. Pt does have history of noncompliance.   Pt bg noted to be 918, however without evidence of DKA. No metabolic acidosi. Bicarb 25. Beta hydroxy <0.05. Anion gap 8. UA without ketones.  Insulin gtt initiated in the ED, continue   Transition to home insulin regimen once bg controlled. Home regimen 5 units novolin bid w/ meals.  Acute metabolic encephalopathy  On initial presentation to the ED, pt noted to be confused. Originally reported to only be AAOx1.   While in the ED, his mental status improved.  He is currently AAOx4 although unable to describe the exact events that happened at home prior to his arrival. He reports first thing he remembers is being in the ambulance.  Pt does have prior admission in 10/2024 with similar presentation. Confusion at that time thought to be related to hyperglycemia/alcohol withdrawal/witnessed seizure-like activity  Suspect confusion this evening most likely secondary to hyperglycemia although possibly related to alcohol use given reported last drink 3 days ago  Continue to monitor pt mentation  Hypertensive urgency  Pt blood pressure noted w/ SBP in the 200's upon arrival, now improved   Continue home bp regimen of hydralazine tid and amlodipine bid  Hydralazine and labetalol available prn  Continue to monitor bp  "closely  Transaminitis  Transaminitis noted  Suspected likely related to reported alcohol abuse  Continue to monitor CMP for improvement   Alcohol abuse with history of withdrawal (HCC)  Pt reports drinking \"heavily\" daily. He reports he is unable to quantify his drinking. He states that his last drink was approximately 3 days ago  Pt did have previous admission 10/2024 with witnessed seizure thought to be related to alcohol withdrawal/hyperglycemia  Coma panel pending w/ ETOH level  Monitor on [facility] protocol for now  Vitamin supplementation  Continue to encourage cessation  CKD stage 3b, GFR 30-44 ml/min ([facility])  Lab Results   Component Value Date    EGFR 50 02/05/2025    EGFR 50 10/26/2024    EGFR 44 10/25/2024    CREATININE 1.54 (H) 02/05/2025    CREATININE 1.54 (H) 10/26/2024    CREATININE 1.70 (H) 10/25/2024   At baseline      VTE Pharmacologic Prophylaxis: VTE Score: 1 Low Risk (Score 0-2) - Encourage Ambulation.  Code Status: Level 1 - Full Code   Discussion with family:  Update in AM.     Anticipated Length of Stay: Patient will be admitted on an inpatient basis with an anticipated length of stay of greater than 2 midnights secondary to HHS, acute metabolic encephalopathy.    History of Present Illness   Chief Complaint: \"I don't remember what happened, but I feel better now\"    Wes Araujo is a 54 y.o. male who presents with HHS and acute metabolic encephalopathy. Per pt family, pt was having difficulty getting out of bed with generalized weakness and confusion prompting them to call EMS. Pt reports he does not recall this. He reports he remembers walking up in the ambulance. He states he now feels much better and is pretty much back to his baseline. He is currently AAOx4. Pt does report his blood glucose levels have been high at home. He also states he is a \"heavy\" daily drinker. He states he is unable to quantify exactly how much he drinks daily, but states his last drink was 3 days ago. He denies any " other symptoms such as chest pain, SOB, fever, chills, palpitations, abdominal pain, headache, neuro or urinary symptoms.    Review of Systems   Constitutional:  Positive for fatigue. Negative for appetite change, chills, diaphoresis and fever.   HENT:  Negative for congestion, rhinorrhea and sore throat.    Eyes:  Negative for photophobia and visual disturbance.   Respiratory:  Negative for cough, shortness of breath and wheezing.    Cardiovascular:  Negative for chest pain, palpitations and leg swelling.   Gastrointestinal:  Negative for abdominal distention, abdominal pain, blood in stool, constipation, diarrhea, nausea and vomiting.   Genitourinary:  Negative for dysuria and hematuria.   Musculoskeletal:  Negative for arthralgias, back pain, myalgias and neck stiffness.   Skin:  Negative for color change and rash.   Neurological:  Negative for dizziness, seizures, syncope, weakness, light-headedness and headaches.   Psychiatric/Behavioral:  Positive for confusion. Negative for agitation and behavioral problems. The patient is not nervous/anxious.    All other systems reviewed and are negative.      Historical Information   Past Medical History:   Diagnosis Date    Alcohol abuse     Chronic pancreatitis (HCC)     Diabetes mellitus (HCC)     Type 2    Pancreatitis     Paroxysmal A-fib (HCC)     Thrombocytopenia (HCC)      Past Surgical History:   Procedure Laterality Date    INCISION AND DRAINAGE OF WOUND N/A 8/16/2020    Procedure: INCISION AND DRAINAGE (I&D) HEAD/FACE;  Surgeon: Estrada Walton DMD;  Location: [facility];  Service: Maxillofacial    IR BIOPSY BONE MARROW  2/7/2019    REMOVAL OF IMPACTED TOOTH - COMPLETELY BONY N/A 8/16/2020    Procedure: EXTRACTION TEETH MULTIPLE #2, 3;  Surgeon: Estrada Walton DMD;  Location: [facility];  Service: Maxillofacial     Social History     Tobacco Use    Smoking status: Former     Passive exposure: Past    Smokeless tobacco: Never   Vaping Use    Vaping status: Never  Used   Substance and Sexual Activity    Alcohol use: Yes    Drug use: Yes     Types: Cocaine    Sexual activity: Not on file     E-Cigarette/Vaping    E-Cigarette Use Never User      E-Cigarette/Vaping Substances     Family history non-contributory  Social History:  Marital Status: Single   Patient Pre-hospital Living Situation: Home  Patient Pre-hospital Level of Mobility: walks  Patient Pre-hospital Diet Restrictions: diabetic    Meds/Allergies   I have reviewed home medications using recent Epic encounter.  Prior to Admission medications    Medication Sig Start Date End Date Taking? Authorizing Provider   Alcohol Swabs 70 % PADS May substitute brand based on insurance coverage. Check glucose TID. 1/27/24   Julio Mcneil MD   amLODIPine (NORVASC) 5 mg tablet Take 1 tablet (5 mg total) by mouth 2 (two) times a day 3/14/24 5/13/24  Fannie Hernandez MD   Blood Glucose Monitoring Suppl (OneTouch Verio Reflect) w/Device KIT May substitute brand based on insurance coverage. Check glucose TID. 1/27/24   Julio Mcneil MD   Continuous Blood Gluc Sensor (FreeStyle Pablo 3 Sensor) MISC Use 1 each every 14 (fourteen) days 3/18/24   Stuart Mata,    folic acid ( Folic Acid) 1 mg tablet Take 1 tablet (1 mg total) by mouth daily 10/26/24 11/25/24  Fannie Hernandez MD   glucose blood (OneTouch Verio) test strip May substitute brand based on insurance coverage. Check glucose TID. 1/27/24   Julio Mcneil MD   hydrALAZINE (APRESOLINE) 25 mg tablet Take 1 tablet (25 mg total) by mouth every 8 (eight) hours 3/14/24 5/13/24  Fannie Hernandez MD   insulin NPH-insulin regular (HumuLIN 70/30) 100 units/mL subcutaneous injection Inject 5 Units under the skin 2 (two) times a day before meals 10/28/24   Johny Sung, DO   Insulin Pen Needle (BD Pen Needle Claudia 2nd Gen) 32G X 4 MM MISC For use with insulin pen. Pharmacy may dispense brand covered by insurance.  3/18/24   Stuart Mata DO   methocarbamol (ROBAXIN) 500 mg tablet Take 1 tablet (500 mg total) by mouth every 6 (six) hours as needed for muscle spasms 3/14/24   Fannie Hernandez MD   Multiple Vitamin (multivitamin) tablet Take 1 tablet by mouth daily 1/27/24 2/26/24  Julio Mcneil MD   OneTouch Delica Lancets 33G MISC May substitute brand based on insurance coverage. Check glucose TID. 1/27/24   Julio Mcneil MD   pancrelipase, Lip-Prot-Amyl, (CREON) 24,000 units Take 24,000 units of lipase by mouth 3 (three) times a day with meals 10/26/24   Fannie Hernandez MD   thiamine 50 MG tablet Take 1 tablet (50 mg total) by mouth daily 10/26/24   Fannie Hernandez MD     No Known Allergies    Objective :  Temp:  [97.3 °F (36.3 °C)-97.5 °F (36.4 °C)] 97.5 °F (36.4 °C)  HR:  [74-95] 91  BP: (141-202)/() 202/98  Resp:  [16-22] 16  SpO2:  [97 %-100 %] 100 %  O2 Device: None (Room air)    Physical Exam  Vitals and nursing note reviewed.   Constitutional:       General: He is not in acute distress.     Appearance: He is well-developed. He is not ill-appearing.   HENT:      Head: Normocephalic and atraumatic.      Nose: Nose normal. No congestion.      Mouth/Throat:      Mouth: Mucous membranes are moist.      Pharynx: Oropharynx is clear.   Eyes:      Conjunctiva/sclera: Conjunctivae normal.   Cardiovascular:      Rate and Rhythm: Normal rate and regular rhythm.      Heart sounds: Normal heart sounds. No murmur heard.     No friction rub. No gallop.   Pulmonary:      Effort: Pulmonary effort is normal. No respiratory distress.      Breath sounds: Normal breath sounds. No wheezing, rhonchi or rales.   Abdominal:      General: Bowel sounds are normal. There is no distension.      Palpations: Abdomen is soft.      Tenderness: There is no abdominal tenderness.   Musculoskeletal:      Cervical back: Neck supple.      Right lower leg: No edema.      Left  lower leg: No edema.   Skin:     General: Skin is warm and dry.      Capillary Refill: Capillary refill takes less than 2 seconds.   Neurological:      General: No focal deficit present.      Mental Status: He is alert and oriented to person, place, and time. Mental status is at baseline.      Cranial Nerves: No cranial nerve deficit.      Sensory: No sensory deficit.      Motor: No weakness.      Coordination: Coordination normal.   Psychiatric:         Mood and Affect: Mood normal.         Behavior: Behavior normal.          Lines/Drains:            Lab Results: I have reviewed the following results:  Results from last 7 days   Lab Units 02/05/25 2009   WBC Thousand/uL 8.39   HEMOGLOBIN g/dL 11.1*   HEMATOCRIT % 30.4*   PLATELETS Thousands/uL 226   SEGS PCT % 63   LYMPHO PCT % 24   MONO PCT % 9   EOS PCT % 3     Results from last 7 days   Lab Units 02/05/25 2009   SODIUM mmol/L 123*   POTASSIUM mmol/L 4.0   CHLORIDE mmol/L 90*   CO2 mmol/L 25   BUN mg/dL 10   CREATININE mg/dL 1.54*   ANION GAP mmol/L 8   CALCIUM mg/dL 8.4   ALBUMIN g/dL 3.1*   TOTAL BILIRUBIN mg/dL 0.51   ALK PHOS U/L 325*   ALT U/L 94*   AST U/L 60*   GLUCOSE RANDOM mg/dL 918*         Results from last 7 days   Lab Units 02/05/25  2327 02/05/25  2213 02/05/25 2000   POC GLUCOSE mg/dl 485* >600* >600*     Lab Results   Component Value Date    HGBA1C 11.2 (H) 10/23/2024    HGBA1C 15.7 (H) 01/26/2024    HGBA1C 10.2 (H) 01/30/2023           Imaging Results Review: No pertinent imaging studies reviewed.  Other Study Results Review: EKG was reviewed.     Administrative Statements   I have spent a total time of 75 minutes in caring for this patient on the day of the visit/encounter including Diagnostic results, Impressions, Counseling / Coordination of care, Documenting in the medical record, Reviewing / ordering tests, medicine, procedures  , Obtaining or reviewing history  , and Communicating with other healthcare professionals .    ** Please Note:  This note has been constructed using a voice recognition system. **

## 2025-02-06 NOTE — PROGRESS NOTES
"Progress Note - Hospitalist   Name: Wes Araujo 54 y.o. male I MRN: [id number]  Unit/Bed#: [place] [address] -01 I Date of Admission: 2/5/2025   Date of Service: 2/6/2025 I Hospital Day: 1    Assessment & Plan  Hyperosmolar hyperglycemic state (HHS) ([facility])  Lab Results   Component Value Date    HGBA1C 9.8 (H) 02/05/2025       Recent Labs     02/06/25  0311 02/06/25  0410 02/06/25  0734 02/06/25  1119   POCGLU 46* 172* 234* 102     Blood Sugar Average: Last 72 hrs:  (P) [id number]    54 year old male presented to the ED due to hyperglycemia and confusion. Possibly due to uncontrolled hyperglycemia secondary to medication non-compliance.   Due to hypoglycemia, bridged to Home regimen 5 units novolin bid w/ meals.  Acute metabolic encephalopathy  On initial presentation to the ED, pt noted to be confused. Improved possibly due to hypertensive urgency, hyperglycemia, alcohol abuse    CT head, chest abdomen pelvis ordered. Pending  Hypertensive urgency  Pt blood pressure noted w/ SBP in the 200's upon arrival, now improved   Resolved   Transaminitis  Possibly due to alcohol abuse, hyperglycemia  Continue monitoring    Recent Labs     02/05/25 2009 02/06/25  0634   AST 60* 39   ALT 94* 69*   TBILI 0.51 0.40   ALKPHOS 325* 233*     Alcohol abuse with history of withdrawal ([facility])  Pt reports drinking \"heavily\" daily. He reports he is unable to quantify his drinking. He states that his last drink was approximately 3 days ago  Pt did have previous admission 10/2024 with witnessed seizure thought to be related to alcohol withdrawal/hyperglycemia  [facility] protocol  CKD stage 3b, GFR 30-44 ml/min ([facility])  Lab Results   Component Value Date    EGFR 58 02/06/2025    EGFR 50 02/05/2025    EGFR 50 10/26/2024    CREATININE 1.37 (H) 02/06/2025    CREATININE 1.54 (H) 02/05/2025    CREATININE 1.54 (H) 10/26/2024   At baseline    VTE Pharmacologic Prophylaxis: VTE Score: 1 Low Risk (Score 0-2) - Encourage Ambulation.    Mobility: "   Basic Mobility Inpatient Raw Score: 17  -HLM Goal: 5: Stand one or more mins  -HLM Achieved: 3: Sit at edge of bed    Discussions with Specialists or Other Care Team Provider: case management    Education and Discussions with Family / Patient: patient    Current Length of Stay: 1 day(s)  Current Patient Status: Inpatient   Certification Statement: The patient will continue to require additional inpatient hospital stay due to further imaging, diet tolerability, med management  Discharge Plan: 24 to 48 hours    Code Status: Level 1 - Full Code    Subjective   Patient seen and examined.  utilized since patient declined cyracom. Complaining of abdominal discomfort, but otherwise no new acute complaints.    Objective   Vitals:   Temp (24hrs), Av °F (36.7 °C), Min:97.3 °F (36.3 °C), Max:98.7 °F (37.1 °C)    Temp:  [97.3 °F (36.3 °C)-98.7 °F (37.1 °C)] 98.7 °F (37.1 °C)  HR:  [] 100  Resp:  [16-22] 18  BP: (133-202)/() 148/77  SpO2:  [97 %-100 %] 99 %  Body mass index is 17.65 kg/m².     Input and Output Summary (last 24 hours):     Intake/Output Summary (Last 24 hours) at 2025 1302  Last data filed at 2025 0827  Gross per 24 hour   Intake 990 ml   Output --   Net 990 ml       Physical Exam  Vitals reviewed.   Constitutional:       General: He is not in acute distress.  HENT:      Head: Normocephalic.      Nose: Nose normal.      Mouth/Throat:      Mouth: Mucous membranes are moist.   Eyes:      General: No scleral icterus.  Cardiovascular:      Rate and Rhythm: Normal rate.   Pulmonary:      Effort: Pulmonary effort is normal. No respiratory distress.   Abdominal:      General: There is no distension.      Palpations: Abdomen is soft.      Tenderness: There is no abdominal tenderness.   Skin:     General: Skin is warm.   Neurological:      Mental Status: He is alert.   Psychiatric:         Mood and Affect: Mood normal.         Behavior: Behavior normal.        Lines/Drains:              Lab Results: I have reviewed the following results:   Results from last 7 days   Lab Units 02/06/25  0639 02/05/25 2009   WBC Thousand/uL 17.30* 8.39   HEMOGLOBIN g/dL 10.6* 11.1*   PLATELETS Thousands/uL 242 226   MCV fL 85 87     Results from last 7 days   Lab Units 02/06/25  0634 02/05/25 2009   SODIUM mmol/L 134* 123*   POTASSIUM mmol/L 3.5 4.0   CHLORIDE mmol/L 102 90*   CO2 mmol/L 25 25   ANION GAP mmol/L 7 8   BUN mg/dL 10 10   CREATININE mg/dL 1.37* 1.54*   CALCIUM mg/dL 8.3* 8.4   ALBUMIN g/dL 2.6* 3.1*   TOTAL BILIRUBIN mg/dL 0.40 0.51   ALK PHOS U/L 233* 325*   ALT U/L 69* 94*   AST U/L 39 60*   EGFR ml/min/1.73sq m 58 50   GLUCOSE RANDOM mg/dL 172* 918*     Results from last 7 days   Lab Units 02/06/25  0634 02/05/25 2009   MAGNESIUM mg/dL 1.9 1.8*         Results from last 7 days   Lab Units 02/06/25  0051 02/05/25  2211 02/05/25 2009   HS TNI 0HR ng/L  --   --  18   HS TNI 2HR ng/L  --  20  --    HS TNI 4HR ng/L 23  --   --               Results from last 7 days   Lab Units 02/06/25  1119 02/06/25  0734 02/06/25  0410 02/06/25  0311 02/06/25  0159 02/06/25  0008 02/05/25  2327 02/05/25  2213 02/05/25 2000   POC GLUCOSE mg/dl 102 234* 172* 46* 110 375* 485* >600* >600*     Results from last 7 days   Lab Units 02/05/25 2009   HEMOGLOBIN A1C % 9.8*           Recent Cultures (last 7 days):         Imaging:  Reviewed radiology reports from this admission: imaging pending    Last 24 Hours Medication List:     Current Facility-Administered Medications:     acetaminophen (TYLENOL) tablet 650 mg, Q6H PRN    amLODIPine (NORVASC) tablet 5 mg, BID    folic acid (FOLVITE) tablet 1 mg, Daily    hydrALAZINE (APRESOLINE) injection 10 mg, Q6H PRN    hydrALAZINE (APRESOLINE) tablet 25 mg, Q8H JAISON    insulin aspart protamine-insulin aspart (NovoLOG 70/30) 100 units/mL subcutaneous injection 5 Units, BID AC    insulin lispro (HumALOG/ADMELOG) 100 units/mL subcutaneous injection 1-5  Units, TID AC **AND** Fingerstick Glucose (POCT), TID AC    insulin lispro (HumALOG/ADMELOG) 100 units/mL subcutaneous injection 1-5 Units, HS    labetalol (NORMODYNE) injection 10 mg, Q6H PRN    lactated ringers infusion, Continuous, Last Rate: 125 mL/hr (02/06/25 0897)    multivitamin-minerals (CENTRUM) tablet 1 tablet, Daily    pancrelipase (Lip-Prot-Amyl) (CREON) delayed release capsule 24,000 Units, TID With Meals    thiamine tablet 100 mg, Daily      **Please Note: This note may have been constructed using a voice recognition system.**

## 2025-02-06 NOTE — ASSESSMENT & PLAN NOTE
Possibly due to alcohol abuse, hyperglycemia  Continue monitoring    Recent Labs     02/05/25 2009 02/06/25  0634   AST 60* 39   ALT 94* 69*   TBILI 0.51 0.40   ALKPHOS 325* 233*

## 2025-02-06 NOTE — ASSESSMENT & PLAN NOTE
"Pt reports drinking \"heavily\" daily. He reports he is unable to quantify his drinking. He states that his last drink was approximately 3 days ago  Pt did have previous admission 10/2024 with witnessed seizure thought to be related to alcohol withdrawal/hyperglycemia  [facility] protocol  "

## 2025-02-06 NOTE — ASSESSMENT & PLAN NOTE
Pt blood pressure noted w/ SBP in the 200's upon arrival, now improved   Continue home bp regimen of hydralazine tid and amlodipine bid  Hydralazine and labetalol available prn  Continue to monitor bp closely

## 2025-02-06 NOTE — ASSESSMENT & PLAN NOTE
Lab Results   Component Value Date    EGFR 50 02/05/2025    EGFR 50 10/26/2024    EGFR 44 10/25/2024    CREATININE 1.54 (H) 02/05/2025    CREATININE 1.54 (H) 10/26/2024    CREATININE 1.70 (H) 10/25/2024   At baseline

## 2025-02-06 NOTE — ASSESSMENT & PLAN NOTE
"Pt reports drinking \"heavily\" daily. He reports he is unable to quantify his drinking. He states that his last drink was approximately 3 days ago  Pt did have previous admission 10/2024 with witnessed seizure thought to be related to alcohol withdrawal/hyperglycemia  Coma panel pending w/ ETOH level  Monitor on [facility] protocol for now  Vitamin supplementation  Continue to encourage cessation  "

## 2025-02-06 NOTE — ASSESSMENT & PLAN NOTE
Lab Results   Component Value Date    HGBA1C 9.8 (H) 02/05/2025       Recent Labs     02/06/25  0311 02/06/25  0410 02/06/25  0734 02/06/25  1119   POCGLU 46* 172* 234* 102     Blood Sugar Average: Last 72 hrs:  (P) [id number]    54 year old male presented to the ED due to hyperglycemia and confusion. Possibly due to uncontrolled hyperglycemia secondary to medication non-compliance.   Due to hypoglycemia, bridged to Home regimen 5 units novolin bid w/ meals.

## 2025-02-06 NOTE — PLAN OF CARE
Problem: Potential for Falls  Goal: Patient will remain free of falls  Description: INTERVENTIONS:  - Educate patient/family on patient safety including physical limitations  - Instruct patient to call for assistance with activity   - Consult OT/PT to assist with strengthening/mobility   - Keep Call bell within reach  - Keep bed low and locked with side rails adjusted as appropriate  - Keep care items and personal belongings within reach  - Initiate and maintain comfort rounds  - Make Fall Risk Sign visible to staff  - Offer Toileting every  Hours, in advance of need  - Initiate/Maintain alarm  - Obtain necessary fall risk management equipment:   - Apply yellow socks and bracelet for high fall risk patients  - Consider moving patient to room near nurses station  2/6/2025 0404 by Shamika Wren, RN  Outcome: Progressing  2/6/2025 0404 by Shamika Wren, RN  Outcome: Progressing

## 2025-02-06 NOTE — PLAN OF CARE

## 2025-02-06 NOTE — ED PROVIDER NOTES
Time reflects when diagnosis was documented in both MDM as applicable and the Disposition within this note       Time User Action Codes Description Comment    2/5/2025  9:14 PM Heriberto Blanco Add [R73.9] Hyperglycemia     2/5/2025  9:14 PM Heriberto Blanco Add [E11.01] HHNC (hyperglycemic hyperosmolar nonketotic coma) ([facility])           ED Disposition       ED Disposition   Admit    Condition   Stable    Date/Time   Wed Feb 5, 2025  9:14 PM    Comment   Case was discussed with EUGENIO and the patient's admission status was agreed to be Admission Status: inpatient status to the service of   .               Assessment & Plan       Medical Decision Making  54-year-old male with insulin-dependent diabetes here with markedly elevated blood sugars and some confusion.  His mental status did improve somewhat during his ED stay and at time of admission he was able to tell me his name and birthdate and that we were in a hospital.  Still somewhat foggy on the events of the evening today.  Blood sugar noted to be 918, other laboratory studies performed to rule out diabetic ketoacidosis, significant anemia, or other electrolyte abnormalities were reassuring.  Given his markedly elevated glucose and confusion without other metabolic abnormality seems most consistent with HHNK, will start insulin gtt., admit for further eval and treatment.    Amount and/or Complexity of Data Reviewed  Labs: ordered. Decision-making details documented in ED Course.  ECG/medicine tests: ordered and independent interpretation performed. Decision-making details documented in ED Course.     Details: Normal sinus rhythm at rate of 83, normal axis, normal intervals, no significant ST elevations or depressions to suggest cardiac ischemia.    Risk  Decision regarding hospitalization.             Medications   insulin regular (HumuLIN R,NovoLIN R) 1 Units/mL in sodium chloride 0.9 % 100 mL infusion (12 Units/hr Intravenous New Bag 2/5/25 4093)   lactated  ringers infusion (has no administration in time range)   thiamine tablet 100 mg (has no administration in time range)   folic acid (FOLVITE) tablet 1 mg (has no administration in time range)   multivitamin-minerals (CENTRUM) tablet 1 tablet (has no administration in time range)   magnesium sulfate 2 g/50 mL IVPB (premix) 2 g (has no administration in time range)   hydrALAZINE (APRESOLINE) tablet 25 mg (has no administration in time range)   amLODIPine (NORVASC) tablet 5 mg (has no administration in time range)   pancrelipase (Lip-Prot-Amyl) (CREON) delayed release capsule 24,000 Units (has no administration in time range)   sodium chloride 0.9 % bolus 1,000 mL (0 mL Intravenous Stopped 2/5/25 2109)       ED Risk Strat Scores                          SBIRT 20yo+      Flowsheet Row Most Recent Value   Initial Alcohol Screen: US AUDIT-C     1. How often do you have a drink containing alcohol? 3 Filed at: 02/05/2025 2235   2. How many drinks containing alcohol do you have on a typical day you are drinking?  0 Filed at: 02/05/2025 2235   3a. Male UNDER 65: How often do you have five or more drinks on one occasion? 3 Filed at: 02/05/2025 2235   Audit-C Score 6 Filed at: 02/05/2025 2235   MUSA: How many times in the past year have you...    Used an illegal drug or used a prescription medication for non-medical reasons? Never Filed at: 02/05/2025 2235                            History of Present Illness       Chief Complaint   Patient presents with    Hyperglycemia - Symptomatic     Pt arrived via EMS. EMS states family witnessed pt trying to get into bed, pt fell and could not get up. Pt is known diabetic had a BG of >600 in ambulance. Pt is A&O only to self. Pt is confused during triage does not know what happened.        Past Medical History:   Diagnosis Date    Alcohol abuse     Chronic pancreatitis (HCC)     Diabetes mellitus (HCC)     Type 2    Pancreatitis     Paroxysmal A-fib (HCC)     Thrombocytopenia (HCC)        Past Surgical History:   Procedure Laterality Date    INCISION AND DRAINAGE OF WOUND N/A 8/16/2020    Procedure: INCISION AND DRAINAGE (I&D) HEAD/FACE;  Surgeon: Estrada Walton DMD;  Location: [facility];  Service: Maxillofacial    IR BIOPSY BONE MARROW  2/7/2019    REMOVAL OF IMPACTED TOOTH - COMPLETELY BONY N/A 8/16/2020    Procedure: EXTRACTION TEETH MULTIPLE #2, 3;  Surgeon: Estrada Walton DMD;  Location: [facility];  Service: Maxillofacial      Family History   Problem Relation Age of Onset    Diabetes Mother     Hypertension Mother     Hyperlipidemia Father       Social History     Tobacco Use    Smoking status: Former     Passive exposure: Past    Smokeless tobacco: Never   Vaping Use    Vaping status: Never Used   Substance Use Topics    Alcohol use: Yes    Drug use: Yes     Types: Cocaine      E-Cigarette/Vaping    E-Cigarette Use Never User       E-Cigarette/Vaping Substances      I have reviewed and agree with the history as documented.     Refills 64-year-old male with insulin-dependent diabetes here for evaluation of generalized weakness and confusion.  History somewhat limited as patient is unsure exactly why he is here in the emergency department.  He does seem to be aware that his blood sugar is quite high but is unable to tell me much about the last few days or how he arrived here.  Denies falls or head strike.  Has history of poorly controlled diabetes and multiple previous ED visits for markedly elevated blood glucose.  He is awake and alert and able to follow simple commands but is unable to tell me where we are or what he was doing prior to arrival.  Per EMS report, family called EMS due to patient having generalized weakness and difficulty getting into bed.          Review of Systems   Unable to perform ROS: Mental status change           Objective       ED Triage Vitals [02/05/25 2004]   Temperature Pulse Blood Pressure Respirations SpO2 Patient Position - Orthostatic VS   (!) 97.3 °F (36.3  °C) 95 (!) 198/98 22 100 % Lying      Temp Source Heart Rate Source BP Location FiO2 (%) Pain Score    Oral Monitor Left arm -- --      Vitals      Date and Time Temp Pulse SpO2 Resp BP Pain Score FACES Pain Rating User   02/05/25 2215 -- 84 100 % 18 141/66 -- --    02/05/25 2200 -- 78 98 % 16 169/89 -- --    02/05/25 2145 -- 82 99 % 18 186/91 -- --    02/05/25 2130 -- 74 99 % 18 181/91 -- --    02/05/25 2115 -- 74 98 % 16 191/92 -- --    02/05/25 2100 -- 74 100 % 16 192/90 -- --    02/05/25 2045 -- 76 99 % 16 199/92 -- --    02/05/25 2030 -- 80 97 % 20 201/94 -- --    02/05/25 2004 97.3 °F (36.3 °C) 95 100 % 22 198/98 -- --             Physical Exam  Vitals and nursing note reviewed.   Constitutional:       General: He is not in acute distress.     Appearance: He is well-developed.   HENT:      Head: Normocephalic and atraumatic.      Mouth/Throat:      Mouth: Mucous membranes are dry.   Eyes:      Conjunctiva/sclera: Conjunctivae normal.   Cardiovascular:      Rate and Rhythm: Normal rate and regular rhythm.      Heart sounds: No murmur heard.  Pulmonary:      Effort: Pulmonary effort is normal. No respiratory distress.      Breath sounds: Normal breath sounds.   Abdominal:      Palpations: Abdomen is soft.      Tenderness: There is no abdominal tenderness.   Musculoskeletal:         General: No swelling.      Cervical back: Neck supple.   Skin:     General: Skin is warm and dry.      Capillary Refill: Capillary refill takes less than 2 seconds.   Neurological:      General: No focal deficit present.      Mental Status: He is alert.      Cranial Nerves: No cranial nerve deficit.      Sensory: No sensory deficit.      Comments: Awake and alert able to tell me his name but cannot tell me the date, year, or where we are.  Able to follow simple commands without focal deficits but unable to participate in a detailed neurologic exam.   Psychiatric:         Mood and Affect: Mood normal.         Results  Reviewed       Procedure Component Value Units Date/Time    HS Troponin I 2hr [[id number]]  (Normal) Collected: 02/05/25 2211    Lab Status: Final result Specimen: Blood from Arm, Right Updated: 02/05/25 2245     hs TnI 2hr 20 ng/L      Delta 2hr hsTnI 2 ng/L     Ethanol [[id number]]     Lab Status: No result Specimen: Blood     Salicylate level [[id number]]     Lab Status: No result Specimen: Blood     Acetaminophen level-If concentration is detectable, please discuss with medical  on call. [[id number]]     Lab Status: No result Specimen: Blood     Fingerstick Glucose (POCT) [[id number]]  (Abnormal) Collected: 02/05/25 2213    Lab Status: Final result Specimen: Blood Updated: 02/05/25 2214     POC Glucose >600 mg/dl     HS Troponin I 4hr [[id number]]     Lab Status: No result Specimen: Blood     Urine Microscopic [[id number]]  (Normal) Collected: 02/05/25 2144    Lab Status: Final result Specimen: Urine, Clean Catch Updated: 02/05/25 2200     RBC, UA 1-2 /hpf      WBC, UA 1-2 /hpf      Epithelial Cells None Seen /hpf      Bacteria, UA None Seen /hpf     Urine Macroscopic, POC [[id number]]  (Abnormal) Collected: 02/05/25 2144    Lab Status: Final result Specimen: Urine Updated: 02/05/25 2145     Color, UA Yellow     Clarity, UA Clear     pH, UA 6.0     Leukocytes, UA Elevated glucose may cause decreased leukocyte values. See urine microscopic for UWBC result     Nitrite, UA Negative     Protein,  (2+) mg/dl      Glucose, UA >=1000 (1%) mg/dl      Ketones, UA Negative mg/dl      Urobilinogen, UA 0.2 E.U./dl      Bilirubin, UA Negative     Occult Blood, UA Trace     Specific Gravity, UA 1.015    Narrative:      CLINITEK RESULT    Basic metabolic panel [[id number]]  (Abnormal) Collected: 02/05/25 2009    Lab Status: Final result Specimen: Blood from Arm, Right Updated: 02/05/25 2110     Sodium 123 mmol/L      Potassium 4.0 mmol/L      Chloride 90 mmol/L      CO2 25 mmol/L      ANION GAP 8 mmol/L      BUN 10  mg/dL      Creatinine 1.54 mg/dL      Glucose 918 mg/dL      Calcium 8.4 mg/dL      eGFR 50 ml/min/1.73sq m     Narrative:      National Kidney Disease Foundation guidelines for Chronic Kidney Disease (CKD):     Stage 1 with normal or high GFR (GFR > 90 mL/min/1.73 square meters)    Stage 2 Mild CKD (GFR = 60-89 mL/min/1.73 square meters)    Stage 3A Moderate CKD (GFR = 45-59 mL/min/1.73 square meters)    Stage 3B Moderate CKD (GFR = 30-44 mL/min/1.73 square meters)    Stage 4 Severe CKD (GFR = 15-29 mL/min/1.73 square meters)    Stage 5 End Stage CKD (GFR <15 mL/min/1.73 square meters)  Note: GFR calculation is accurate only with a steady state creatinine    Hepatic function panel [[id number]]  (Abnormal) Collected: 02/05/25 2009    Lab Status: Final result Specimen: Blood from Arm, Right Updated: 02/05/25 2050     Total Bilirubin 0.51 mg/dL      Bilirubin, Direct 0.14 mg/dL      Alkaline Phosphatase 325 U/L      AST 60 U/L      ALT 94 U/L      Total Protein 6.1 g/dL      Albumin 3.1 g/dL     Magnesium [[id number]]  (Abnormal) Collected: 02/05/25 2009    Lab Status: Final result Specimen: Blood from Arm, Right Updated: 02/05/25 2050     Magnesium 1.8 mg/dL     Beta Hydroxybutyrate [[id number]]  (Normal) Collected: 02/05/25 2009    Lab Status: Final result Specimen: Blood from Arm, Right Updated: 02/05/25 2050     Beta- Hydroxybutyrate <0.05 mmol/L     FLU/COVID Rapid Antigen (30 min. TAT) - Preferred screening test in ED [[id number]]  (Normal) Collected: 02/05/25 2009    Lab Status: Final result Specimen: Nares from Nose Updated: 02/05/25 2042     SARS COV Rapid Antigen Negative     Influenza A Rapid Antigen Negative     Influenza B Rapid Antigen Negative    Narrative:      This test has been performed using the [employer] Sanam 2 FLU+SARS Antigen test under the Emergency Use Authorization (EUA). This test has been validated by the  and verified by the performing laboratory. The Sanam uses lateral flow  immunofluorescent sandwich assay to detect SARS-COV, Influenza A and Influenza B Antigen.     The Quidel Sanam 2 SARS Antigen test does not differentiate between SARS-CoV and SARS-CoV-2.     Negative results are presumptive and may be confirmed with a molecular assay, if necessary, for patient management. Negative results do not rule out SARS-CoV-2 or influenza infection and should not be used as the sole basis for treatment or patient management decisions. A negative test result may occur if the level of antigen in a sample is below the limit of detection of this test.     Positive results are indicative of the presence of viral antigens, but do not rule out bacterial infection or co-infection with other viruses.     All test results should be used as an adjunct to clinical observations and other information available to the provider.    FOR PEDIATRIC PATIENTS - copy/paste COVID Guidelines URL to browser: [email or web address]    HS Troponin 0hr (reflex protocol) [[id number]]  (Normal) Collected: 02/05/25 2009    Lab Status: Final result Specimen: Blood from Arm, Right Updated: 02/05/25 2041     hs TnI 0hr 18 ng/L     Blood gas, venous [[id number]]  (Abnormal) Collected: 02/05/25 2009    Lab Status: Final result Specimen: Blood from Arm, Right Updated: 02/05/25 2024     pH, Sami 7.296     pCO2, Sami 49.1 mm Hg      pO2, Sami 37.2 mm Hg      HCO3, Sami 23.4 mmol/L      Base Excess, Sami -3.3 mmol/L      O2 Content, Sami 12.4 ml/dL      O2 HGB, VENOUS 71.6 %     CBC and differential [[id number]]  (Abnormal) Collected: 02/05/25 2009    Lab Status: Final result Specimen: Blood from Arm, Right Updated: 02/05/25 2018     WBC 8.39 Thousand/uL      RBC 3.48 Million/uL      Hemoglobin 11.1 g/dL      Hematocrit 30.4 %      MCV 87 fL      MCH 31.9 pg      MCHC 36.5 g/dL      RDW 11.1 %      MPV 10.9 fL      Platelets 226 Thousands/uL      nRBC 0 /100 WBCs      Segmented % 63 %       Immature Grans % 1 %      Lymphocytes % 24 %      Monocytes % 9 %      Eosinophils Relative 3 %      Basophils Relative 0 %      Absolute Neutrophils 5.27 Thousands/µL      Absolute Immature Grans 0.06 Thousand/uL      Absolute Lymphocytes 2.05 Thousands/µL      Absolute Monocytes 0.71 Thousand/µL      Eosinophils Absolute 0.27 Thousand/µL      Basophils Absolute 0.03 Thousands/µL     Fingerstick Glucose (POCT) [[id number]]  (Abnormal) Collected: 02/05/25 2000    Lab Status: Final result Specimen: Blood Updated: 02/05/25 2001     POC Glucose >600 mg/dl             No orders to display       CriticalCare Time    Date/Time: 2/5/2025 10:58 PM    Performed by: Heriberto Blanco MD  Authorized by: Heriberto Blanco MD    Critical care provider statement:     Critical care time (minutes):  51    Critical care time was exclusive of:  Separately billable procedures and treating other patients and teaching time    Critical care was necessary to treat or prevent imminent or life-threatening deterioration of the following conditions:  Endocrine crisis    Critical care was time spent personally by me on the following activities:  Blood draw for specimens, obtaining history from patient or surrogate, development of treatment plan with patient or surrogate, discussions with consultants, evaluation of patient's response to treatment, examination of patient, ordering and performing treatments and interventions, ordering and review of laboratory studies, ordering and review of radiographic studies, re-evaluation of patient's condition and review of old charts    I assumed direction of critical care for this patient from another provider in my specialty: no        ED Medication and Procedure Management   Prior to Admission Medications   Prescriptions Last Dose Informant Patient Reported? Taking?   Alcohol Swabs 70 % PADS   No No   Sig: May substitute brand based on insurance coverage. Check glucose TID.   Blood Glucose Monitoring  Suppl (OneTouch Verio Reflect) w/Device KIT   No No   Sig: May substitute brand based on insurance coverage. Check glucose TID.   Continuous Blood Gluc Sensor (FreeStyle Pablo 3 Sensor) MISC   No No   Sig: Use 1 each every 14 (fourteen) days   Insulin Pen Needle (BD Pen Needle Claudia 2nd Gen) 32G X 4 MM MISC   No No   Sig: For use with insulin pen. Pharmacy may dispense brand covered by insurance.   Multiple Vitamin (multivitamin) tablet   No No   Sig: Take 1 tablet by mouth daily   OneTouch Delica Lancets 33G MISC   No No   Sig: May substitute brand based on insurance coverage. Check glucose TID.   amLODIPine (NORVASC) 5 mg tablet   No No   Sig: Take 1 tablet (5 mg total) by mouth 2 (two) times a day   folic acid (KP Folic Acid) 1 mg tablet   No No   Sig: Take 1 tablet (1 mg total) by mouth daily   glucose blood (OneTouch Verio) test strip   No No   Sig: May substitute brand based on insurance coverage. Check glucose TID.   hydrALAZINE (APRESOLINE) 25 mg tablet   No No   Sig: Take 1 tablet (25 mg total) by mouth every 8 (eight) hours   insulin NPH-insulin regular (HumuLIN 70/30) 100 units/mL subcutaneous injection   No No   Sig: Inject 5 Units under the skin 2 (two) times a day before meals   methocarbamol (ROBAXIN) 500 mg tablet   No No   Sig: Take 1 tablet (500 mg total) by mouth every 6 (six) hours as needed for muscle spasms   pancrelipase, Lip-Prot-Amyl, (CREON) 24,000 units   No No   Sig: Take 24,000 units of lipase by mouth 3 (three) times a day with meals   thiamine 50 MG tablet   No No   Sig: Take 1 tablet (50 mg total) by mouth daily      Facility-Administered Medications: None     Patient's Medications   Discharge Prescriptions    No medications on file     No discharge procedures on file.  ED SEPSIS DOCUMENTATION   Time reflects when diagnosis was documented in both MDM as applicable and the Disposition within this note       Time User Action Codes Description Comment    2/5/2025  9:14 PM Heriberto Blanco  Add [R73.9] Hyperglycemia     2/5/2025  9:14 PM Heriberto Blanco Add [E11.01] HHNC (hyperglycemic hyperosmolar nonketotic coma) (HCC)                  Heriberto Blanco MD  02/05/25 1366

## 2025-02-06 NOTE — ASSESSMENT & PLAN NOTE
On initial presentation to the ED, pt noted to be confused. Originally reported to only be AAOx1.   While in the ED, his mental status improved.  He is currently AAOx4 although unable to describe the exact events that happened at home prior to his arrival. He reports first thing he remembers is being in the ambulance.  Pt does have prior admission in 10/2024 with similar presentation. Confusion at that time thought to be related to hyperglycemia/alcohol withdrawal/witnessed seizure-like activity  Suspect confusion this evening most likely secondary to hyperglycemia although possibly related to alcohol use given reported last drink 3 days ago  Continue to monitor pt mentation

## 2025-02-06 NOTE — ASSESSMENT & PLAN NOTE
Lab Results   Component Value Date    EGFR 58 02/06/2025    EGFR 50 02/05/2025    EGFR 50 10/26/2024    CREATININE 1.37 (H) 02/06/2025    CREATININE 1.54 (H) 02/05/2025    CREATININE 1.54 (H) 10/26/2024   At baseline

## 2025-02-07 LAB
ALBUMIN SERPL BCG-MCNC: 2.7 G/DL (ref 3.5–5)
ALP SERPL-CCNC: 207 U/L (ref 34–104)
ALT SERPL W P-5'-P-CCNC: 52 U/L (ref 7–52)
ANION GAP SERPL CALCULATED.3IONS-SCNC: 6 MMOL/L (ref 4–13)
AST SERPL W P-5'-P-CCNC: 23 U/L (ref 13–39)
BASOPHILS # BLD AUTO: 0.03 THOUSANDS/ΜL (ref 0–0.1)
BASOPHILS NFR BLD AUTO: 0 % (ref 0–1)
BILIRUB SERPL-MCNC: 0.56 MG/DL (ref 0.2–1)
BUN SERPL-MCNC: 12 MG/DL (ref 5–25)
CALCIUM ALBUM COR SERPL-MCNC: 9.1 MG/DL (ref 8.3–10.1)
CALCIUM SERPL-MCNC: 8.1 MG/DL (ref 8.4–10.2)
CHLORIDE SERPL-SCNC: 98 MMOL/L (ref 96–108)
CO2 SERPL-SCNC: 25 MMOL/L (ref 21–32)
CREAT SERPL-MCNC: 1.54 MG/DL (ref 0.6–1.3)
EOSINOPHIL # BLD AUTO: 0.32 THOUSAND/ΜL (ref 0–0.61)
EOSINOPHIL NFR BLD AUTO: 3 % (ref 0–6)
ERYTHROCYTE [DISTWIDTH] IN BLOOD BY AUTOMATED COUNT: 10.8 % (ref 11.6–15.1)
FERRITIN SERPL-MCNC: 875 NG/ML (ref 24–336)
FOLATE SERPL-MCNC: >22.3 NG/ML
GFR SERPL CREATININE-BSD FRML MDRD: 50 ML/MIN/1.73SQ M
GLUCOSE SERPL-MCNC: 109 MG/DL (ref 65–140)
GLUCOSE SERPL-MCNC: 220 MG/DL (ref 65–140)
GLUCOSE SERPL-MCNC: 280 MG/DL (ref 65–140)
GLUCOSE SERPL-MCNC: 317 MG/DL (ref 65–140)
GLUCOSE SERPL-MCNC: 323 MG/DL (ref 65–140)
GLUCOSE SERPL-MCNC: 52 MG/DL (ref 65–140)
HCT VFR BLD AUTO: 26.9 % (ref 36.5–49.3)
HGB BLD-MCNC: 9.9 G/DL (ref 12–17)
IMM GRANULOCYTES # BLD AUTO: 0.05 THOUSAND/UL (ref 0–0.2)
IMM GRANULOCYTES NFR BLD AUTO: 1 % (ref 0–2)
IRON SATN MFR SERPL: 43 % (ref 15–50)
IRON SERPL-MCNC: 81 UG/DL (ref 50–212)
LYMPHOCYTES # BLD AUTO: 2.04 THOUSANDS/ΜL (ref 0.6–4.47)
LYMPHOCYTES NFR BLD AUTO: 19 % (ref 14–44)
MAGNESIUM SERPL-MCNC: 1.7 MG/DL (ref 1.9–2.7)
MCH RBC QN AUTO: 31.6 PG (ref 26.8–34.3)
MCHC RBC AUTO-ENTMCNC: 36.8 G/DL (ref 31.4–37.4)
MCV RBC AUTO: 86 FL (ref 82–98)
MONOCYTES # BLD AUTO: 0.83 THOUSAND/ΜL (ref 0.17–1.22)
MONOCYTES NFR BLD AUTO: 8 % (ref 4–12)
NEUTROPHILS # BLD AUTO: 7.47 THOUSANDS/ΜL (ref 1.85–7.62)
NEUTS SEG NFR BLD AUTO: 69 % (ref 43–75)
NRBC BLD AUTO-RTO: 0 /100 WBCS
PHOSPHATE SERPL-MCNC: 2.6 MG/DL (ref 2.7–4.5)
PLATELET # BLD AUTO: 192 THOUSANDS/UL (ref 149–390)
PMV BLD AUTO: 10.5 FL (ref 8.9–12.7)
POTASSIUM SERPL-SCNC: 3.8 MMOL/L (ref 3.5–5.3)
PROT SERPL-MCNC: 5.2 G/DL (ref 6.4–8.4)
RBC # BLD AUTO: 3.13 MILLION/UL (ref 3.88–5.62)
SODIUM SERPL-SCNC: 129 MMOL/L (ref 135–147)
TIBC SERPL-MCNC: 190.4 UG/DL (ref 250–450)
TRANSFERRIN SERPL-MCNC: 136 MG/DL (ref 203–362)
UIBC SERPL-MCNC: 109 UG/DL (ref 155–355)
VIT B12 SERPL-MCNC: 849 PG/ML (ref 180–914)
WBC # BLD AUTO: 10.74 THOUSAND/UL (ref 4.31–10.16)

## 2025-02-07 PROCEDURE — 80053 COMPREHEN METABOLIC PANEL: CPT | Performed by: STUDENT IN AN ORGANIZED HEALTH CARE EDUCATION/TRAINING PROGRAM

## 2025-02-07 PROCEDURE — 84100 ASSAY OF PHOSPHORUS: CPT | Performed by: STUDENT IN AN ORGANIZED HEALTH CARE EDUCATION/TRAINING PROGRAM

## 2025-02-07 PROCEDURE — 82607 VITAMIN B-12: CPT | Performed by: PHYSICIAN ASSISTANT

## 2025-02-07 PROCEDURE — 82728 ASSAY OF FERRITIN: CPT | Performed by: PHYSICIAN ASSISTANT

## 2025-02-07 PROCEDURE — 82948 REAGENT STRIP/BLOOD GLUCOSE: CPT

## 2025-02-07 PROCEDURE — 83550 IRON BINDING TEST: CPT | Performed by: PHYSICIAN ASSISTANT

## 2025-02-07 PROCEDURE — 85025 COMPLETE CBC W/AUTO DIFF WBC: CPT | Performed by: STUDENT IN AN ORGANIZED HEALTH CARE EDUCATION/TRAINING PROGRAM

## 2025-02-07 PROCEDURE — 83540 ASSAY OF IRON: CPT | Performed by: PHYSICIAN ASSISTANT

## 2025-02-07 PROCEDURE — 99232 SBSQ HOSP IP/OBS MODERATE 35: CPT | Performed by: STUDENT IN AN ORGANIZED HEALTH CARE EDUCATION/TRAINING PROGRAM

## 2025-02-07 PROCEDURE — 82746 ASSAY OF FOLIC ACID SERUM: CPT | Performed by: PHYSICIAN ASSISTANT

## 2025-02-07 PROCEDURE — 83735 ASSAY OF MAGNESIUM: CPT | Performed by: STUDENT IN AN ORGANIZED HEALTH CARE EDUCATION/TRAINING PROGRAM

## 2025-02-07 RX ORDER — INSULIN ASPART 100 [IU]/ML
10 INJECTION, SUSPENSION SUBCUTANEOUS
Status: DISCONTINUED | OUTPATIENT
Start: 2025-02-08 | End: 2025-02-07

## 2025-02-07 RX ORDER — HEPARIN SODIUM 5000 [USP'U]/ML
5000 INJECTION, SOLUTION INTRAVENOUS; SUBCUTANEOUS EVERY 8 HOURS SCHEDULED
Status: DISCONTINUED | OUTPATIENT
Start: 2025-02-07 | End: 2025-02-11 | Stop reason: HOSPADM

## 2025-02-07 RX ORDER — PANTOPRAZOLE SODIUM 40 MG/1
40 TABLET, DELAYED RELEASE ORAL
Status: DISCONTINUED | OUTPATIENT
Start: 2025-02-08 | End: 2025-02-11 | Stop reason: HOSPADM

## 2025-02-07 RX ORDER — INSULIN ASPART 100 [IU]/ML
10 INJECTION, SUSPENSION SUBCUTANEOUS
Status: DISCONTINUED | OUTPATIENT
Start: 2025-02-07 | End: 2025-02-08

## 2025-02-07 RX ADMIN — Medication 100 MG: at 08:37

## 2025-02-07 RX ADMIN — INSULIN LISPRO 2 UNITS: 100 INJECTION, SOLUTION INTRAVENOUS; SUBCUTANEOUS at 12:02

## 2025-02-07 RX ADMIN — PANCRELIPASE 24000 UNITS: 120000; 24000; 76000 CAPSULE, DELAYED RELEASE PELLETS ORAL at 17:37

## 2025-02-07 RX ADMIN — INSULIN ASPART 5 UNITS: 100 INJECTION, SUSPENSION SUBCUTANEOUS at 08:38

## 2025-02-07 RX ADMIN — PANCRELIPASE 24000 UNITS: 120000; 24000; 76000 CAPSULE, DELAYED RELEASE PELLETS ORAL at 12:02

## 2025-02-07 RX ADMIN — FOLIC ACID 1 MG: 1 TABLET ORAL at 08:37

## 2025-02-07 RX ADMIN — INSULIN ASPART 10 UNITS: 100 INJECTION, SUSPENSION SUBCUTANEOUS at 17:38

## 2025-02-07 RX ADMIN — HEPARIN SODIUM 5000 UNITS: 5000 INJECTION INTRAVENOUS; SUBCUTANEOUS at 17:37

## 2025-02-07 RX ADMIN — PANCRELIPASE 24000 UNITS: 120000; 24000; 76000 CAPSULE, DELAYED RELEASE PELLETS ORAL at 08:37

## 2025-02-07 RX ADMIN — ACETAMINOPHEN 650 MG: 325 TABLET, FILM COATED ORAL at 08:37

## 2025-02-07 RX ADMIN — Medication 1 TABLET: at 08:37

## 2025-02-07 RX ADMIN — AMLODIPINE BESYLATE 5 MG: 5 TABLET ORAL at 08:37

## 2025-02-07 RX ADMIN — INSULIN LISPRO 2 UNITS: 100 INJECTION, SOLUTION INTRAVENOUS; SUBCUTANEOUS at 08:37

## 2025-02-07 RX ADMIN — HEPARIN SODIUM 5000 UNITS: 5000 INJECTION INTRAVENOUS; SUBCUTANEOUS at 21:00

## 2025-02-07 RX ADMIN — INSULIN LISPRO 1 UNITS: 100 INJECTION, SOLUTION INTRAVENOUS; SUBCUTANEOUS at 17:37

## 2025-02-07 RX ADMIN — HYDRALAZINE HYDROCHLORIDE 25 MG: 25 TABLET ORAL at 13:43

## 2025-02-07 RX ADMIN — HYDRALAZINE HYDROCHLORIDE 25 MG: 25 TABLET ORAL at 05:13

## 2025-02-07 RX ADMIN — HYDRALAZINE HYDROCHLORIDE 25 MG: 25 TABLET ORAL at 21:46

## 2025-02-07 RX ADMIN — AMLODIPINE BESYLATE 5 MG: 5 TABLET ORAL at 17:37

## 2025-02-07 NOTE — PLAN OF CARE
Problem: Potential for Falls  Goal: Patient will remain free of falls  Description: INTERVENTIONS:  - Educate patient/family on patient safety including physical limitations  - Instruct patient to call for assistance with activity   - Consult OT/PT to assist with strengthening/mobility   - Keep Call bell within reach  - Keep bed low and locked with side rails adjusted as appropriate  - Keep care items and personal belongings within reach  - Initiate and maintain comfort rounds  - Make Fall Risk Sign visible to staff  - Offer Toileting every 2 Hours, in advance of need  - Initiate/Maintain bed alarm  - Obtain necessary fall risk management equipment: alarm   - Apply yellow socks and bracelet for high fall risk patients  - Consider moving patient to room near nurses station  Outcome: Progressing     Problem: PAIN - ADULT  Goal: Verbalizes/displays adequate comfort level or baseline comfort level  Description: Interventions:  - Encourage patient to monitor pain and request assistance  - Assess pain using appropriate pain scale  - Administer analgesics based on type and severity of pain and evaluate response  - Implement non-pharmacological measures as appropriate and evaluate response  - Consider cultural and social influences on pain and pain management  - Notify physician/advanced practitioner if interventions unsuccessful or patient reports new pain  Outcome: Progressing     Problem: INFECTION - ADULT  Goal: Absence or prevention of progression during hospitalization  Description: INTERVENTIONS:  - Assess and monitor for signs and symptoms of infection  - Monitor lab/diagnostic results  - Monitor all insertion sites, i.e. indwelling lines, tubes, and drains  - Monitor endotracheal if appropriate and nasal secretions for changes in amount and color  - [place] appropriate cooling/warming therapies per order  - Administer medications as ordered  - Instruct and encourage patient and family to use good hand hygiene  technique  - Identify and instruct in appropriate isolation precautions for identified infection/condition  Outcome: Progressing     Problem: SAFETY ADULT  Goal: Patient will remain free of falls  Description: INTERVENTIONS:  - Educate patient/family on patient safety including physical limitations  - Instruct patient to call for assistance with activity   - Consult OT/PT to assist with strengthening/mobility   - Keep Call bell within reach  - Keep bed low and locked with side rails adjusted as appropriate  - Keep care items and personal belongings within reach  - Initiate and maintain comfort rounds  - Make Fall Risk Sign visible to staff  - Offer Toileting every 2 Hours, in advance of need  - Initiate/Maintain bed alarm  - Obtain necessary fall risk management equipment: alarm   - Apply yellow socks and bracelet for high fall risk patients  - Consider moving patient to room near nurses station  Outcome: Progressing  Goal: Maintain or return to baseline ADL function  Description: INTERVENTIONS:  -  Assess patient's ability to carry out ADLs; assess patient's baseline for ADL function and identify physical deficits which impact ability to perform ADLs (bathing, care of mouth/teeth, toileting, grooming, dressing, etc.)  - Assess/evaluate cause of self-care deficits   - Assess range of motion  - Assess patient's mobility; develop plan if impaired  - Assess patient's need for assistive devices and provide as appropriate  - Encourage maximum independence but intervene and supervise when necessary  - Involve family in performance of ADLs  - Assess for home care needs following discharge   - Consider OT consult to assist with ADL evaluation and planning for discharge  - Provide patient education as appropriate  Outcome: Progressing  Goal: Maintains/Returns to pre admission functional level  Description: INTERVENTIONS:  - Perform AM-PAC 6 Click Basic Mobility/ Daily Activity assessment daily.  - Set and communicate daily  mobility goal to care team and patient/family/caregiver.   - Collaborate with rehabilitation services on mobility goals if consulted  - Perform Range of Motion 4 times a day.  - Reposition patient every 2 hours.  - Dangle patient 3 times a day  - Stand patient 3 times a day  - Ambulate patient 3 times a day  - Out of bed to chair 3 times a day   - Out of bed for meals 3 times a day  - Out of bed for toileting  - Record patient progress and toleration of activity level   Outcome: Progressing     Problem: DISCHARGE PLANNING  Goal: Discharge to home or other facility with appropriate resources  Description: INTERVENTIONS:  - Identify barriers to discharge w/patient and caregiver  - Arrange for needed discharge resources and transportation as appropriate  - Identify discharge learning needs (meds, wound care, etc.)  - Arrange for interpretive services to assist at discharge as needed  - Refer to Case Management Department for coordinating discharge planning if the patient needs post-hospital services based on physician/advanced practitioner order or complex needs related to functional status, cognitive ability, or social support system  Outcome: Progressing     Problem: Knowledge Deficit  Goal: Patient/family/caregiver demonstrates understanding of disease process, treatment plan, medications, and discharge instructions  Description: Complete learning assessment and assess knowledge base.  Interventions:  - Provide teaching at level of understanding  - Provide teaching via preferred learning methods  Outcome: Progressing     Problem: Nutrition/Hydration-ADULT  Goal: Nutrient/Hydration intake appropriate for improving, restoring or maintaining nutritional needs  Description: Monitor and assess patient's nutrition/hydration status for malnutrition. Collaborate with interdisciplinary team and initiate plan and interventions as ordered.  Monitor patient's weight and dietary intake as ordered or per policy. Utilize nutrition  screening tool and intervene as necessary. Determine patient's food preferences and provide high-protein, high-caloric foods as appropriate.     INTERVENTIONS:  - Monitor oral intake, urinary output, labs, and treatment plans  - Assess nutrition and hydration status and recommend course of action  - Evaluate amount of meals eaten  - Assist patient with eating if necessary   - Allow adequate time for meals  - Recommend/ encourage appropriate diets, oral nutritional supplements, and vitamin/mineral supplements  - Order, calculate, and assess calorie counts as needed  - Recommend, monitor, and adjust tube feedings and TPN/PPN based on assessed needs  - Assess need for intravenous fluids  - Provide specific nutrition/hydration education as appropriate  - Include patient/family/caregiver in decisions related to nutrition  Outcome: Progressing

## 2025-02-07 NOTE — PLAN OF CARE
Problem: Potential for Falls  Goal: Patient will remain free of falls  Description: INTERVENTIONS:  - Educate patient/family on patient safety including physical limitations  - Instruct patient to call for assistance with activity   - Consult OT/PT to assist with strengthening/mobility   - Keep Call bell within reach  - Keep bed low and locked with side rails adjusted as appropriate  - Keep care items and personal belongings within reach  - Initiate and maintain comfort rounds  - Make Fall Risk Sign visible to staff  - Offer Toileting every  Hours, in advance of need  - Initiate/Maintain alarm  - Obtain necessary fall risk management equipment:   - Apply yellow socks and bracelet for high fall risk patients  - Consider moving patient to room near nurses station  Outcome: Progressing     Problem: PAIN - ADULT  Goal: Verbalizes/displays adequate comfort level or baseline comfort level  Description: Interventions:  - Encourage patient to monitor pain and request assistance  - Assess pain using appropriate pain scale  - Administer analgesics based on type and severity of pain and evaluate response  - Implement non-pharmacological measures as appropriate and evaluate response  - Consider cultural and social influences on pain and pain management  - Notify physician/advanced practitioner if interventions unsuccessful or patient reports new pain  Outcome: Progressing     Problem: INFECTION - ADULT  Goal: Absence or prevention of progression during hospitalization  Description: INTERVENTIONS:  - Assess and monitor for signs and symptoms of infection  - Monitor lab/diagnostic results  - Monitor all insertion sites, i.e. indwelling lines, tubes, and drains  - Monitor endotracheal if appropriate and nasal secretions for changes in amount and color  - [place] appropriate cooling/warming therapies per order  - Administer medications as ordered  - Instruct and encourage patient and family to use good hand hygiene technique  -  Identify and instruct in appropriate isolation precautions for identified infection/condition  Outcome: Progressing

## 2025-02-07 NOTE — CONSULTS
Patient MRN: [id number]  Date of Service: 2/7/2025  Referring Provider: Julio Mcneil MD   Provider Creating Note: Bhavna Krishna PA-C  PCP: Rush Kebede    Patient refuses [employer] . Utilized Med Surg staff for translation.    Reason for Consult: Abdominal pain [R10.9]     HISTORY OF PRESENT ILLNESS:  Wes Araujo is a 54 y.o. male with PMH including AUD, poorly controlled T2DM, Afib, HTN, CKD3B, chronic etoh-related pancreatitis with steatorrhea c/w EPI. He was admitted 2/5 with hyperosmolar hyperglycemic state. He was initially confused in the ED which has resolved and felt likely from hyperglycemia/alcohol withdrawal. He is a poor historian even with , therefore some history from EMR. He drinks alcohol daily, unable to quantify, on CIWA. GI consultation for abdominal pain. Patient had some mild epigastric abdominal pain yesterday, none today. Eating breakfast upon my arrival without difficulty. He denies recent nausea, vomiting, hematemesis, melena, heartburn. He states his GI problem is actually chronic greasy fatty stools. Its unclear if he takes Creon at home, he denies being offered therapy in the past. Its unclear if he fully understands that his pancreas is likely the cause of his loose stools.   Lipase WNL. BG still not well controlled, 323 this morning. LFTs have fluctuated, currently transaminases and bili normal, ALP improving. CT C/A/P without acute findings. Chronic pancreatitis with chronic PD dilation with calculi again noted. Patient had been on Creon during 3/2024 hospitalization which helped his steathorrhea per hospital notes. No prior EGD or colonoscopy. He refused GI follow up per 3/2024 telephone notes. He was restarted on Creon yesterday. No BMs reported since yesterday. He does report 40lb weight loss since last year.     Review of Systems:    General:   No fever or chills; +significant weight loss.   EENT:   No ear pain, facial swelling; No  sneezing, sore throat.   Skin:   No rashes, color changes.   Respiratory:     No shortness of breath, cough, wheezing, stridor.   Cardiovascular:     No chest pain, palpitations.   Gastrointestinal:    As per HPI.   Musculoskeletal:     No arthralgias, myalgias, swelling.   Neurologic:   No dizziness, numbness, weakness. No speech difficulties.   Psych:   No agitation, suicidal ideations    Otherwise, All other twelve-point review of systems normal.     Past Medical History:   Diagnosis Date    Alcohol abuse     Chronic pancreatitis (HCC)     Diabetes mellitus (HCC)     Type 2    Pancreatitis     Paroxysmal A-fib (HCC)     Thrombocytopenia (HCC)      Past Surgical History:   Procedure Laterality Date    INCISION AND DRAINAGE OF WOUND N/A 8/16/2020    Procedure: INCISION AND DRAINAGE (I&D) HEAD/FACE;  Surgeon: Estrada Walton DMD;  Location: [facility];  Service: Maxillofacial    IR BIOPSY BONE MARROW  2/7/2019    REMOVAL OF IMPACTED TOOTH - COMPLETELY BONY N/A 8/16/2020    Procedure: EXTRACTION TEETH MULTIPLE #2, 3;  Surgeon: Estrada Walton DMD;  Location: [facility];  Service: Maxillofacial     No Known Allergies    Medications:  Home Medications  Prior to Admission medications    Medication Sig Start Date End Date Taking? Authorizing Provider   Alcohol Swabs 70 % PADS May substitute brand based on insurance coverage. Check glucose TID. 1/27/24   Julio Mcneil MD   amLODIPine (NORVASC) 5 mg tablet Take 1 tablet (5 mg total) by mouth 2 (two) times a day 3/14/24 5/13/24  Fannie Hernandez MD   Blood Glucose Monitoring Suppl (OneTouch Verio Reflect) w/Device KIT May substitute brand based on insurance coverage. Check glucose TID. 1/27/24   Julio Mcneil MD   Continuous Blood Gluc Sensor (FreeStyle Pablo 3 Sensor) [facility] Use 1 each every 14 (fourteen) days 3/18/24   Stuart Mata DO   folic acid ( Folic Acid) 1 mg tablet Take 1 tablet (1 mg total) by mouth daily 10/26/24 11/25/24  Fannie  Marianna Hernandez MD   glucose blood (OneTouch Verio) test strip May substitute brand based on insurance coverage. Check glucose TID. 1/27/24   Julio Mcneil MD   hydrALAZINE (APRESOLINE) 25 mg tablet Take 1 tablet (25 mg total) by mouth every 8 (eight) hours 3/14/24 5/13/24  Fannie Hernandez MD   insulin NPH-insulin regular (HumuLIN 70/30) 100 units/mL subcutaneous injection Inject 5 Units under the skin 2 (two) times a day before meals 10/28/24   Johny Sung,    Insulin Pen Needle (BD Pen Needle Claudia 2nd Gen) 32G X 4 MM MISC For use with insulin pen. Pharmacy may dispense brand covered by insurance. 3/18/24   Stuart Mata DO   methocarbamol (ROBAXIN) 500 mg tablet Take 1 tablet (500 mg total) by mouth every 6 (six) hours as needed for muscle spasms 3/14/24   Fannie Hernandez MD   Multiple Vitamin (multivitamin) tablet Take 1 tablet by mouth daily 1/27/24 2/26/24  Julio Mcneil MD   OneTouch Delica Lancets 33G MISC May substitute brand based on insurance coverage. Check glucose TID. 1/27/24   Julio Mcneil MD   pancrelipase, Lip-Prot-Amyl, (CREON) 24,000 units Take 24,000 units of lipase by mouth 3 (three) times a day with meals 10/26/24   Fannie Hernandez MD   thiamine 50 MG tablet Take 1 tablet (50 mg total) by mouth daily 10/26/24   Fannie Hernandez MD       Inhouse Medications    Current Facility-Administered Medications:     acetaminophen (TYLENOL) tablet 650 mg, 650 mg, Oral, Q6H PRN, 650 mg at 02/06/25 0823    amLODIPine (NORVASC) tablet 5 mg, 5 mg, Oral, BID, 5 mg at 02/06/25 1703    folic acid (FOLVITE) tablet 1 mg, 1 mg, Oral, Daily, 1 mg at 02/06/25 0806    hydrALAZINE (APRESOLINE) injection 10 mg, 10 mg, Intravenous, Q6H PRN    hydrALAZINE (APRESOLINE) tablet 25 mg, 25 mg, Oral, Q8H JAISON, 25 mg at 02/07/25 0513    insulin aspart protamine-insulin aspart (NovoLOG 70/30) 100 units/mL  "subcutaneous injection 5 Units, 5 Units, Subcutaneous, BID AC, 5 Units at 02/06/25 1704    insulin lispro (HumALOG/ADMELOG) 100 units/mL subcutaneous injection 1-5 Units, 1-5 Units, Subcutaneous, TID AC, 2 Units at 02/06/25 1705 **AND** Fingerstick Glucose (POCT), , , TID AC    insulin lispro (HumALOG/ADMELOG) 100 units/mL subcutaneous injection 1-5 Units, 1-5 Units, Subcutaneous, HS, 1 Units at 02/06/25 2119    labetalol (NORMODYNE) injection 10 mg, 10 mg, Intravenous, Q6H PRN    multivitamin-minerals (CENTRUM) tablet 1 tablet, 1 tablet, Oral, Daily, 1 tablet at 02/06/25 0806    pancrelipase (Lip-Prot-Amyl) (CREON) delayed release capsule 24,000 Units, 24,000 Units, Oral, TID With Meals, 24,000 Units at 02/06/25 1705    thiamine tablet 100 mg, 100 mg, Oral, Daily, 100 mg at 02/06/25 0806      Social History   reports that he has quit smoking. He has been exposed to tobacco smoke. He has never used smokeless tobacco. He reports current alcohol use. He reports current drug use. Drug: Cocaine.    Family History  Family History   Problem Relation Age of Onset    Diabetes Mother     Hypertension Mother     Hyperlipidemia Father          OBJECTIVE:    /92   Pulse 95   Temp 97.9 °F (36.6 °C)   Resp 20   Ht 5' 9\" (1.753 m)   Wt 56.4 kg (124 lb 5.4 oz)   SpO2 100%   BMI 18.36 kg/m²   Physical Exam:     General Appearance:    Awake, alert, oriented x3, no distress, agitated at time, thin   Head:    Normocephalic without obvious abnormality   Eyes:    PERRL, conjunctiva/corneas clear, EOM's intact        Neck:   Supple, no adenopathy   Throat:   Mucous membranes moist   Lungs:     Clear to auscultation bilaterally, no wheezing or rhonchi   Heart:    Regular rate and rhythm, S1 and S2 normal, no murmur   Abdomen:     Soft, non-tender, non-distended. bowel sounds active. No    masses, rebound or guarding.    Extremities:   Extremities normal. No clubbing, cyanosis or edema   Psych  Derm:   agitated    No jaundice " +tattoos   Neurologic:   CNII-XII grossly intact. Speech intact         Laboratory Studies:  Results from last 7 days   Lab Units 02/07/25  0620 02/06/25  0639 02/05/25 2009   WBC Thousand/uL 10.74* 17.30* 8.39   HEMOGLOBIN g/dL 9.9* 10.6* 11.1*   HEMATOCRIT % 26.9* 28.0* 30.4*   MCV fL 86 85 87   PLATELETS Thousands/uL 192 242 226     Results from last 7 days   Lab Units 02/07/25  0620 02/06/25  0634 02/05/25 2009   SODIUM mmol/L 129* 134* 123*   POTASSIUM mmol/L 3.8 3.5 4.0   CHLORIDE mmol/L 98 102 90*   CO2 mmol/L 25 25 25   BUN mg/dL 12 10 10   CREATININE mg/dL 1.54* 1.37* 1.54*   CALCIUM mg/dL 8.1* 8.3* 8.4   ALBUMIN g/dL 2.7* 2.6* 3.1*   TOTAL BILIRUBIN mg/dL 0.56 0.40 0.51   ALK PHOS U/L 207* 233* 325*   ALT U/L 52 69* 94*   AST U/L 23 39 60*           Imaging and Other Studies:  CT chest abdomen pelvis wo contrast  Result Date: 2/6/2025  Narrative: CT CHEST, ABDOMEN AND PELVIS WITHOUT IV CONTRAST INDICATION: abdominal pain, discomfort, rule out infection. COMPARISON: CT abdomen pelvis 1/30/2022. MRI abdomen 3/12/2024. Renal ultrasound 3/13/2024. TECHNIQUE: CT examination of the chest, abdomen and pelvis was performed without intravenous contrast. Multiplanar 2D reformatted images were created from the source data. This examination, like all CT scans performed in the [facility], was performed utilizing techniques to minimize radiation dose exposure, including the use of iterative reconstruction and automated exposure control. Radiation dose length product (DLP) for this visit: 568 mGy-cm Enteric Contrast: Not administered. FINDINGS: CHEST LUNGS: No acute findings. No suspicious nodules. No endotracheal or endobronchial lesion. PLEURA: Unremarkable. HEART/GREAT VESSELS: Coronary artery calcifications. Borderline heart size. No significant pericardial effusion. No thoracic aortic aneurysm. MEDIASTINUM AND CHALO: Unremarkable. CHEST WALL AND LOWER NECK: Unremarkable. ABDOMEN LIVER/BILIARY TREE:  Unremarkable. GALLBLADDER: No calcified gallstones. No pericholecystic inflammatory change. SPLEEN: Unremarkable. PANCREAS: Fatty replaced pancreatic parenchyma with innumerable parenchymal calcifications. Intraductal calculi are better visualized on comparison MRI/MRCP. Unchanged main duct dilation of up to 8 mm. ADRENAL GLANDS: Unremarkable. KIDNEYS/URETERS: Unremarkable. No hydronephrosis. STOMACH AND BOWEL: Mildly dilated fluid-filled segments of predominantly left-sided small bowel. No bowel obstruction. No bowel pneumatosis. APPENDIX: No findings to suggest appendicitis. ABDOMINOPELVIC CAVITY: No ascites. No pneumoperitoneum. No lymphadenopathy. VESSELS: Unremarkable for patient's age. PELVIS REPRODUCTIVE ORGANS: Unremarkable for patient's age. URINARY BLADDER: Unremarkable. ABDOMINAL WALL/INGUINAL REGIONS: Unremarkable. BONES: No acute fracture or suspicious osseous lesion.     Impression: No acute findings in the chest. Nonspecific enteritis, likely infectious or inflammatory. Chronic pancreatitis with chronic pancreatic ductal dilation and intraductal calculi better appreciated on comparison MRI. The study was marked in EPIC for immediate notification. Workstation performed: [id number]     CT head wo contrast  Result Date: 2/6/2025  Narrative: CT BRAIN - WITHOUT CONTRAST INDICATION:   abdominal pain, discomfort, rule out infection. COMPARISON:  None. TECHNIQUE:  CT examination of the brain was performed.  Multiplanar 2D reformatted images were created from the source data. Radiation dose length product (DLP) for this visit:  860 mGy-cm .  This examination, like all CT scans performed in the [facility], was performed utilizing techniques to minimize radiation dose exposure, including the use of iterative reconstruction and automated exposure control. IMAGE QUALITY:  Diagnostic. FINDINGS: PARENCHYMA:No intracranial mass, mass effect or midline shift. No CT signs of acute infarction.  No  acute parenchymal hemorrhage. VENTRICLES AND EXTRA-AXIAL SPACES:  Normal for the patient's age. VISUALIZED ORBITS: Normal visualized orbits. PARANASAL SINUSES: Normal visualized paranasal sinuses. CALVARIUM AND EXTRACRANIAL SOFT TISSUES: Normal.     Impression: No acute intracranial abnormality. Workstation performed: [id number]         ASSESSMENT AND PLAN:  54 year old male with poorly controlled T2DM admitted 2/5 with [facility]. Management per SLIM.   Acute epigastric abdominal pain x1 day, now resolved. He could have abdominal pain related to severe chronic pancreatitis due to chronic alcohol use but would expect more chronic complaints. Imaging studies show PD dilation with intraductal calculi. Would also consider diabetic gastroparesis given longstanding poorly controlled T2DM. Recommend tight glycemic control, smaller more frequent meals, remain upright with/after meals. And with heavy alcohol use, esophagitis/gastritis possible - recommend alcohol cessation. Add acid suppression while admitted  EPI suspected previously with improvement on Creon per previous hospital notes. Unlikely patient was taking at home.  Fecal elastase stool testing not completed during previous admission, would likely be affected by Creon he is getting currently. Creon restarted yesterday, low fat diet.    40lb weight loss in the setting of heavy alcohol use, poorly controlled diabetes.  Chronic normocytic anemia with iron panel consistent with chronic disease. No active gi bleeding reported. Would recommend outpatient endoscopic workup with EGD and colonoscopy.        Bhavna Krishna PA-C

## 2025-02-07 NOTE — ASSESSMENT & PLAN NOTE
Possibly due to alcohol abuse, hyperglycemia  Resolved    Recent Labs     02/05/25 2009 02/06/25  0634 02/07/25  0620   AST 60* 39 23   ALT 94* 69* 52   TBILI 0.51 0.40 0.56   ALKPHOS 325* 233* 207*

## 2025-02-07 NOTE — UTILIZATION REVIEW
NOTIFICATION OF INPATIENT ADMISSION   AUTHORIZATION REQUEST   SERVICING FACILITY:   [place] [address]  Tax ID: [id number]  NPI: [id number] ATTENDING PROVIDER:  Attending Name and NPI#: Julio Mcneil Md [[id number]]  Address: [address] [place] [address]  Phone: [phone]     ADMISSION INFORMATION:  Place of Service: Inpatient [facility]  Place of Service Code: 21  Inpatient Admission Date/Time: 2/5/25 10:42 PM  Discharge Date/Time: No discharge date for patient encounter.  Admitting Diagnosis Code/Description:  Hyperglycemia [R73.9]  HHNC (hyperglycemic hyperosmolar nonketotic coma) (HCC) [E11.01]     UTILIZATION REVIEW CONTACT:  Katie Almeida, Utilization   Network Utilization Review Department  Phone: [phone]  Fax [phone]  Email: [email or web address]  Contact for approvals/pending authorizations, clinical reviews, and discharge.     PHYSICIAN ADVISORY SERVICES:  Medical Necessity Denial & Egdz-hj-Iacr Review  Phone: [phone]  Fax: [phone]  Email: [email or web address]     DISCHARGE SUPPORT TEAM:  For Patients Discharge Needs & Updates  Phone: [phone] opt. 2 Fax: [phone]  Email: [email or web address]

## 2025-02-07 NOTE — ASSESSMENT & PLAN NOTE
On initial presentation to the ED, pt noted to be confused. Improved possibly due to hypertensive urgency, hyperglycemia, alcohol abuse    CT head, CT chest abdomen pelvis without acute findings.     There was evidence however of chronic pancreatitis with chronic pancreatic ductal dilation and intraductal calculi better appreciated on comparison MRI.    Appreciate GI recommendations. Consideration for possible EGD / colonoscopy on Monday depending on clinical presentation.

## 2025-02-07 NOTE — ASSESSMENT & PLAN NOTE
Lab Results   Component Value Date    HGBA1C 9.8 (H) 02/05/2025     Recent Labs     02/06/25 2057 02/07/25  0733 02/07/25  1113 02/07/25  1538   POCGLU 157* 317* 280* 220*     Blood Sugar Average: Last 72 hrs:  (P) 197    54 year old male presented to the ED due to hyperglycemia and confusion. Possibly due to uncontrolled hyperglycemia secondary to medication non-compliance.   Increase to 10U BID given hyperglycemia

## 2025-02-07 NOTE — PROGRESS NOTES
"Progress Note - Hospitalist   Name: Wes Araujo 54 y.o. male I MRN: [id number]  Unit/Bed#: [place] [address] -01 I Date of Admission: 2/5/2025   Date of Service: 2/7/2025 I Hospital Day: 2    Assessment & Plan  Hyperosmolar hyperglycemic state (HHS) ([facility])  Lab Results   Component Value Date    HGBA1C 9.8 (H) 02/05/2025     Recent Labs     02/06/25  2057 02/07/25  0733 02/07/25  1113 02/07/25  1538   POCGLU 157* 317* 280* 220*     Blood Sugar Average: Last 72 hrs:  (P) 197    54 year old male presented to the ED due to hyperglycemia and confusion. Possibly due to uncontrolled hyperglycemia secondary to medication non-compliance.   Increase to 10U BID given hyperglycemia  Acute metabolic encephalopathy  On initial presentation to the ED, pt noted to be confused. Improved possibly due to hypertensive urgency, hyperglycemia, alcohol abuse    CT head, CT chest abdomen pelvis without acute findings.     There was evidence however of chronic pancreatitis with chronic pancreatic ductal dilation and intraductal calculi better appreciated on comparison MRI.    Appreciate GI recommendations. Consideration for possible EGD / colonoscopy on Monday depending on clinical presentation.   Hypertensive urgency  Pt blood pressure noted w/ SBP in the 200's upon arrival, now improved   Resolved   Transaminitis  Possibly due to alcohol abuse, hyperglycemia  Resolved    Recent Labs     02/05/25 2009 02/06/25  0634 02/07/25  0620   AST 60* 39 23   ALT 94* 69* 52   TBILI 0.51 0.40 0.56   ALKPHOS 325* 233* 207*     Alcohol abuse with history of withdrawal ([facility])  Pt reports drinking \"heavily\" daily. He reports he is unable to quantify his drinking. He states that his last drink was approximately 3 days ago  Pt did have previous admission 10/2024 with witnessed seizure thought to be related to alcohol withdrawal/hyperglycemia  [facility] protocol  CKD stage 3b, GFR 30-44 ml/min ([facility])  Lab Results   Component Value Date    EGFR 50 02/07/2025    EGFR " 58 2025    EGFR 50 2025    CREATININE 1.54 (H) 2025    CREATININE 1.37 (H) 2025    CREATININE 1.54 (H) 2025   At baseline    VTE Pharmacologic Prophylaxis: VTE Score: 1 Moderate Risk (Score 3-4) - Pharmacological DVT Prophylaxis Ordered: heparin.    Mobility:   Basic Mobility Inpatient Raw Score: 17  -[facility] Goal: 5: Stand one or more mins  -HL Achieved: 5: Stand (1 or more minutes)    Discussions with Specialists or Other Care Team Provider: GI    Education and Discussions with Family / Patient: patient    Current Length of Stay: 2 day(s)  Current Patient Status: Inpatient   Certification Statement: The patient will continue to require additional inpatient hospital stay due to GI workup, hyperglycemia  Discharge Plan: weekend versus monday    Code Status: Level 1 - Full Code    Subjective   Patient seen and examined. No new issues overnight.    Objective   Vitals:   Temp (24hrs), Av.2 °F (36.8 °C), Min:97.9 °F (36.6 °C), Max:98.4 °F (36.9 °C)    Temp:  [97.9 °F (36.6 °C)-98.4 °F (36.9 °C)] 98.4 °F (36.9 °C)  HR:  [] 94  Resp:  [18-20] 18  BP: (134-163)/(74-95) 134/74  SpO2:  [97 %-100 %] 97 %  Body mass index is 18.36 kg/m².     Input and Output Summary (last 24 hours):     Intake/Output Summary (Last 24 hours) at 2025 1659  Last data filed at 2025 1328  Gross per 24 hour   Intake 2280 ml   Output --   Net 2280 ml       Physical Exam  Vitals reviewed.   Constitutional:       General: He is not in acute distress.  HENT:      Head: Normocephalic.      Nose: Nose normal.      Mouth/Throat:      Mouth: Mucous membranes are moist.   Eyes:      General: No scleral icterus.  Cardiovascular:      Rate and Rhythm: Normal rate and regular rhythm.   Pulmonary:      Effort: Pulmonary effort is normal. No respiratory distress.      Breath sounds: No wheezing.   Abdominal:      General: There is no distension.      Palpations: Abdomen is soft.      Tenderness: There is no  abdominal tenderness.   Skin:     General: Skin is warm.   Neurological:      Mental Status: He is alert.   Psychiatric:         Mood and Affect: Mood normal.         Behavior: Behavior normal.       Lines/Drains:              Lab Results: I have reviewed the following results:   Results from last 7 days   Lab Units 02/07/25 0620 02/06/25 0639 02/05/25 2009   WBC Thousand/uL 10.74* 17.30* 8.39   HEMOGLOBIN g/dL 9.9* 10.6* 11.1*   PLATELETS Thousands/uL 192 242 226   MCV fL 86 85 87     Results from last 7 days   Lab Units 02/07/25 0620 02/06/25 0634 02/05/25 2009   SODIUM mmol/L 129* 134* 123*   POTASSIUM mmol/L 3.8 3.5 4.0   CHLORIDE mmol/L 98 102 90*   CO2 mmol/L 25 25 25   ANION GAP mmol/L 6 7 8   BUN mg/dL 12 10 10   CREATININE mg/dL 1.54* 1.37* 1.54*   CALCIUM mg/dL 8.1* 8.3* 8.4   ALBUMIN g/dL 2.7* 2.6* 3.1*   TOTAL BILIRUBIN mg/dL 0.56 0.40 0.51   ALK PHOS U/L 207* 233* 325*   ALT U/L 52 69* 94*   AST U/L 23 39 60*   EGFR ml/min/1.73sq m 50 58 50   GLUCOSE RANDOM mg/dL 323* 172* 918*     Results from last 7 days   Lab Units 02/07/25 0620 02/06/25 0634 02/05/25 2009   MAGNESIUM mg/dL 1.7* 1.9 1.8*   PHOSPHORUS mg/dL 2.6*  --   --          Results from last 7 days   Lab Units 02/06/25  0051 02/05/25  2211 02/05/25 2009   HS TNI 0HR ng/L  --   --  18   HS TNI 2HR ng/L  --  20  --    HS TNI 4HR ng/L 23  --   --               Results from last 7 days   Lab Units 02/07/25  1538 02/07/25  1113 02/07/25  0733 02/06/25  2057 02/06/25  1555 02/06/25  1119 02/06/25  0734 02/06/25  0410 02/06/25  0311 02/06/25  0159 02/06/25  0008 02/05/25  2327   POC GLUCOSE mg/dl 220* 280* 317* 157* 260* 102 234* 172* 46* 110 375* 485*     Results from last 7 days   Lab Units 02/05/25  2009   HEMOGLOBIN A1C % 9.8*           Recent Cultures (last 7 days):         Imaging:  Reviewed radiology reports from this admission: ct head, ct cap    Last 24 Hours Medication List:     Current Facility-Administered Medications:      acetaminophen (TYLENOL) tablet 650 mg, Q6H PRN    amLODIPine (NORVASC) tablet 5 mg, BID    folic acid (FOLVITE) tablet 1 mg, Daily    hydrALAZINE (APRESOLINE) injection 10 mg, Q6H PRN    hydrALAZINE (APRESOLINE) tablet 25 mg, Q8H JAISON    insulin aspart protamine-insulin aspart (NovoLOG 70/30) 100 units/mL subcutaneous injection 5 Units, BID AC    insulin lispro (HumALOG/ADMELOG) 100 units/mL subcutaneous injection 1-5 Units, TID AC **AND** Fingerstick Glucose (POCT), TID AC    insulin lispro (HumALOG/ADMELOG) 100 units/mL subcutaneous injection 1-5 Units, HS    labetalol (NORMODYNE) injection 10 mg, Q6H PRN    multivitamin-minerals (CENTRUM) tablet 1 tablet, Daily    pancrelipase (Lip-Prot-Amyl) (CREON) delayed release capsule 24,000 Units, TID With Meals    [START ON 2/8/2025] pantoprazole (PROTONIX) EC tablet 40 mg, Daily Before Breakfast    thiamine tablet 100 mg, Daily      **Please Note: This note may have been constructed using a voice recognition system.**

## 2025-02-07 NOTE — WOUND OSTOMY CARE
Consult Note - Wound   Wes Araujo 54 y.o. male MRN: [id number]  Unit/Bed#: [place] [address] -01 Encounter: [id number]      History and Present Illness:  54 year old male presented to the hospital with metabolic encephalopathy and weakness.  Patient's history significant for alcohol misuse, CKD, DM.    Assessment Findings:   Patient agreeable to assessment.  Refusing [employer] .  Chinese speaking staff RN assisted with interpreting during assessment.  Patient is independent and able to turn in bed.  Continent of bowel and bladder.  Nutrition team following.  Buttocks, sacrum, and heels intact without redness.  Scattered hyperpigmented scar tissue to lower extremities.  Right arm--pink, partial thickness tissue loss with scant serous drainage.  Mary Kay-wound intact.  Patient believes he may have burned himself when he spilled coffee.    See flowsheet for wound details.    Wound Care Plan:   1-Right arm--cleanse with normal saline, pat dry.  Apply Xeroform and cover with silicone bordered foam dressing.  Change dressing every other day and as needed.    Wound care team will sign-off at this time.  Plan of care reviewed with primary RN.    Wound 02/07/25 Arm Anterior;Right (Active)   Wound Image   02/07/25 0932   Wound Description Pink 02/07/25 0932   Mary Kay-wound Assessment Intact 02/07/25 0932   Wound Length (cm) 1.7 cm 02/07/25 0932   Wound Width (cm) 1 cm 02/07/25 0932   Wound Depth (cm) 0.1 cm 02/07/25 0932   Wound Surface Area (cm^2) 1.7 cm^2 02/07/25 0932   Wound Volume (cm^3) 0.17 cm^3 02/07/25 0932   Calculated Wound Volume (cm^3) 0.17 cm^3 02/07/25 0932   Drainage Amount Scant 02/07/25 0932   Drainage Description Serous 02/07/25 0932   Non-staged Wound Description Partial thickness 02/07/25 0932   Treatments Cleansed 02/07/25 0932   Dressing Foam, Silicon (eg. Allevyn, etc) 02/07/25 0932   Dressing Changed Changed 02/07/25 0932   Patient Tolerance Tolerated well 02/07/25 0932   Dressing Status  Clean;Dry;Intact 02/07/25 9967       Yola Peters RN, BSN, CWON

## 2025-02-08 PROBLEM — R63.4 UNINTENTIONAL WEIGHT LOSS: Status: ACTIVE | Noted: 2025-02-08

## 2025-02-08 LAB
ANION GAP SERPL CALCULATED.3IONS-SCNC: 8 MMOL/L (ref 4–13)
BUN SERPL-MCNC: 19 MG/DL (ref 5–25)
CALCIUM SERPL-MCNC: 8.8 MG/DL (ref 8.4–10.2)
CHLORIDE SERPL-SCNC: 97 MMOL/L (ref 96–108)
CO2 SERPL-SCNC: 26 MMOL/L (ref 21–32)
CREAT SERPL-MCNC: 1.91 MG/DL (ref 0.6–1.3)
ERYTHROCYTE [DISTWIDTH] IN BLOOD BY AUTOMATED COUNT: 10.8 % (ref 11.6–15.1)
GFR SERPL CREATININE-BSD FRML MDRD: 38 ML/MIN/1.73SQ M
GLUCOSE SERPL-MCNC: 175 MG/DL (ref 65–140)
GLUCOSE SERPL-MCNC: 232 MG/DL (ref 65–140)
GLUCOSE SERPL-MCNC: 301 MG/DL (ref 65–140)
GLUCOSE SERPL-MCNC: 332 MG/DL (ref 65–140)
GLUCOSE SERPL-MCNC: 60 MG/DL (ref 65–140)
GLUCOSE SERPL-MCNC: 78 MG/DL (ref 65–140)
HCT VFR BLD AUTO: 29.1 % (ref 36.5–49.3)
HGB BLD-MCNC: 10.5 G/DL (ref 12–17)
MAGNESIUM SERPL-MCNC: 2 MG/DL (ref 1.9–2.7)
MCH RBC QN AUTO: 31.7 PG (ref 26.8–34.3)
MCHC RBC AUTO-ENTMCNC: 36.1 G/DL (ref 31.4–37.4)
MCV RBC AUTO: 88 FL (ref 82–98)
PLATELET # BLD AUTO: 201 THOUSANDS/UL (ref 149–390)
PMV BLD AUTO: 12.3 FL (ref 8.9–12.7)
POTASSIUM SERPL-SCNC: 4.8 MMOL/L (ref 3.5–5.3)
RBC # BLD AUTO: 3.31 MILLION/UL (ref 3.88–5.62)
SODIUM SERPL-SCNC: 131 MMOL/L (ref 135–147)
WBC # BLD AUTO: 11.65 THOUSAND/UL (ref 4.31–10.16)

## 2025-02-08 PROCEDURE — 80048 BASIC METABOLIC PNL TOTAL CA: CPT | Performed by: STUDENT IN AN ORGANIZED HEALTH CARE EDUCATION/TRAINING PROGRAM

## 2025-02-08 PROCEDURE — 85027 COMPLETE CBC AUTOMATED: CPT | Performed by: STUDENT IN AN ORGANIZED HEALTH CARE EDUCATION/TRAINING PROGRAM

## 2025-02-08 PROCEDURE — 83735 ASSAY OF MAGNESIUM: CPT | Performed by: STUDENT IN AN ORGANIZED HEALTH CARE EDUCATION/TRAINING PROGRAM

## 2025-02-08 PROCEDURE — 99232 SBSQ HOSP IP/OBS MODERATE 35: CPT | Performed by: INTERNAL MEDICINE

## 2025-02-08 PROCEDURE — 99232 SBSQ HOSP IP/OBS MODERATE 35: CPT | Performed by: STUDENT IN AN ORGANIZED HEALTH CARE EDUCATION/TRAINING PROGRAM

## 2025-02-08 PROCEDURE — 82948 REAGENT STRIP/BLOOD GLUCOSE: CPT

## 2025-02-08 RX ORDER — BISACODYL 5 MG/1
10 TABLET, DELAYED RELEASE ORAL ONCE
Status: COMPLETED | OUTPATIENT
Start: 2025-02-09 | End: 2025-02-09

## 2025-02-08 RX ORDER — INSULIN ASPART 100 [IU]/ML
8 INJECTION, SUSPENSION SUBCUTANEOUS
Status: DISCONTINUED | OUTPATIENT
Start: 2025-02-08 | End: 2025-02-10

## 2025-02-08 RX ADMIN — INSULIN LISPRO 1 UNITS: 100 INJECTION, SOLUTION INTRAVENOUS; SUBCUTANEOUS at 23:04

## 2025-02-08 RX ADMIN — HYDRALAZINE HYDROCHLORIDE 25 MG: 25 TABLET ORAL at 06:09

## 2025-02-08 RX ADMIN — FOLIC ACID 1 MG: 1 TABLET ORAL at 09:44

## 2025-02-08 RX ADMIN — Medication 100 MG: at 09:44

## 2025-02-08 RX ADMIN — HEPARIN SODIUM 5000 UNITS: 5000 INJECTION INTRAVENOUS; SUBCUTANEOUS at 06:09

## 2025-02-08 RX ADMIN — INSULIN LISPRO 3 UNITS: 100 INJECTION, SOLUTION INTRAVENOUS; SUBCUTANEOUS at 09:42

## 2025-02-08 RX ADMIN — HYDRALAZINE HYDROCHLORIDE 25 MG: 25 TABLET ORAL at 23:04

## 2025-02-08 RX ADMIN — INSULIN LISPRO 2 UNITS: 100 INJECTION, SOLUTION INTRAVENOUS; SUBCUTANEOUS at 12:51

## 2025-02-08 RX ADMIN — HEPARIN SODIUM 5000 UNITS: 5000 INJECTION INTRAVENOUS; SUBCUTANEOUS at 12:51

## 2025-02-08 RX ADMIN — PANCRELIPASE 24000 UNITS: 120000; 24000; 76000 CAPSULE, DELAYED RELEASE PELLETS ORAL at 12:51

## 2025-02-08 RX ADMIN — HYDRALAZINE HYDROCHLORIDE 25 MG: 25 TABLET ORAL at 12:52

## 2025-02-08 RX ADMIN — PANCRELIPASE 24000 UNITS: 120000; 24000; 76000 CAPSULE, DELAYED RELEASE PELLETS ORAL at 17:14

## 2025-02-08 RX ADMIN — Medication 1 TABLET: at 09:44

## 2025-02-08 RX ADMIN — PANTOPRAZOLE SODIUM 40 MG: 40 TABLET, DELAYED RELEASE ORAL at 06:09

## 2025-02-08 RX ADMIN — INSULIN ASPART 10 UNITS: 100 INJECTION, SUSPENSION SUBCUTANEOUS at 09:42

## 2025-02-08 RX ADMIN — AMLODIPINE BESYLATE 5 MG: 5 TABLET ORAL at 17:14

## 2025-02-08 RX ADMIN — HEPARIN SODIUM 5000 UNITS: 5000 INJECTION INTRAVENOUS; SUBCUTANEOUS at 23:04

## 2025-02-08 RX ADMIN — AMLODIPINE BESYLATE 5 MG: 5 TABLET ORAL at 09:44

## 2025-02-08 RX ADMIN — PANCRELIPASE 24000 UNITS: 120000; 24000; 76000 CAPSULE, DELAYED RELEASE PELLETS ORAL at 09:44

## 2025-02-08 NOTE — ASSESSMENT & PLAN NOTE
On initial presentation to the ED, pt noted to be confused. Improved possibly due to hypertensive urgency, hyperglycemia, alcohol abuse    CT head, CT chest abdomen pelvis without acute findings.   There was evidence however of chronic pancreatitis with chronic pancreatic ductal dilation and intraductal calculi better appreciated on comparison MRI.    Appreciate GI recommendations. Consideration for possible EGD / colonoscopy on Monday

## 2025-02-08 NOTE — PROGRESS NOTES
Progress Note - Gastroenterology   Name: Wes Araujo 54 y.o. male I MRN: [id number]  Unit/Bed#: [place] [address] -01 I Date of Admission: 2/5/2025   Date of Service: 2/8/2025 I Hospital Day: 3    Assessment & Plan  Hyperosmolar hyperglycemic state (HHS) ([facility])  Lab Results   Component Value Date    HGBA1C 9.8 (H) 02/05/2025       Recent Labs     02/07/25  2107 02/07/25  2152 02/08/25  0730 02/08/25  1154   POCGLU 52* 109 332* 232*       Blood Sugar Average: Last 72 hrs:  (P) 193.5    Alcohol abuse with history of withdrawal (HCC)  CIWA protocol   Transaminitis  Improving; monitor. No biliary or hepatic pathology on admission CT   Unintentional weight loss  Reportedly 20-40 lb weight loss over the past year. Possibly related to poor nutrition in setting of etoh use disorder and chronic pancreatitis, however, cannot exclude malignant process. Patient agreeable to EGD/colonoscopy inpatient     - will plan for EGD/colonoscopy on Monday; OK for diet today, clear liquid diet tomorrow and start prep. NPO midnight for scopes Monday   - consider EUS for evaluation of chronic pancreatic duct dilation and pancreatitis in future    Chronic pancreatitis (HCC)  As above; pain control per primary team     I have discussed the above management plan in detail with the primary service.   Gastroenterology service will follow.    Subjective   Patient evaluated at bedside. Ongoing intermittent epigastric pain. No n/v. Father at bedside.     Objective :  Temp:  [97.8 °F (36.6 °C)-98.4 °F (36.9 °C)] 97.9 °F (36.6 °C)  HR:  [] 98  BP: (128-160)/(65-89) 128/65  Resp:  [17-20] 18  SpO2:  [97 %-99 %] 98 %  O2 Device: None (Room air)    Physical Exam  Vitals reviewed.   Constitutional:       Appearance: Normal appearance.   HENT:      Head: Normocephalic and atraumatic.      Mouth/Throat:      Mouth: Mucous membranes are moist.   Eyes:      Conjunctiva/sclera: Conjunctivae normal.   Cardiovascular:      Rate and Rhythm: Normal rate.    Pulmonary:      Effort: Pulmonary effort is normal. No respiratory distress.   Abdominal:      Palpations: Abdomen is soft.      Tenderness: There is abdominal tenderness (epigastric).   Skin:     General: Skin is warm and dry.   Neurological:      Mental Status: He is alert.   Psychiatric:         Mood and Affect: Mood normal.           Lab Results: I have reviewed the following results:CBC/BMP:   .     02/08/25  0512   WBC 11.65*   HGB 10.5*   HCT 29.1*      SODIUM 131*   K 4.8   CL 97   CO2 26   BUN 19   CREATININE 1.91*   GLUC 301*   MG 2.0       Imaging Results Review: I reviewed radiology reports from this admission including: CT chest, CT abdomen/pelvis, and MRI abdomen/MRCP.  Other Study Results Review: EKG was reviewed.

## 2025-02-08 NOTE — ASSESSMENT & PLAN NOTE
Lab Results   Component Value Date    EGFR 38 02/08/2025    EGFR 50 02/07/2025    EGFR 58 02/06/2025    CREATININE 1.91 (H) 02/08/2025    CREATININE 1.54 (H) 02/07/2025    CREATININE 1.37 (H) 02/06/2025     Baseline of 1.1 ~ 1.54.  Elevated, continue monitoring for now. Urinary retention protocol

## 2025-02-08 NOTE — ASSESSMENT & PLAN NOTE
Lab Results   Component Value Date    HGBA1C 9.8 (H) 02/05/2025     Recent Labs     02/07/25  2107 02/07/25  2152 02/08/25  0730 02/08/25  1154   POCGLU 52* 109 332* 232*     Blood Sugar Average: Last 72 hrs:  (P) 193.5    54 year old male presented to the ED due to hyperglycemia and confusion. Possibly due to uncontrolled hyperglycemia secondary to medication non-compliance.   Will decrease to 8U TID with meals given hypoglycemia.

## 2025-02-08 NOTE — PLAN OF CARE
Problem: Potential for Falls  Goal: Patient will remain free of falls  Description: INTERVENTIONS:  - Educate patient/family on patient safety including physical limitations  - Instruct patient to call for assistance with activity   - Consult OT/PT to assist with strengthening/mobility   - Keep Call bell within reach  - Keep bed low and locked with side rails adjusted as appropriate  - Keep care items and personal belongings within reach  - Initiate and maintain comfort rounds  - Make Fall Risk Sign visible to staff  - Offer Toileting every 2 Hours, in advance of need  - Initiate/Maintain alarm  - Obtain necessary fall risk management equipment  - Apply yellow socks and bracelet for high fall risk patients  - Consider moving patient to room near nurses station  Outcome: Progressing     Problem: PAIN - ADULT  Goal: Verbalizes/displays adequate comfort level or baseline comfort level  Description: Interventions:  - Encourage patient to monitor pain and request assistance  - Assess pain using appropriate pain scale  - Administer analgesics based on type and severity of pain and evaluate response  - Implement non-pharmacological measures as appropriate and evaluate response  - Consider cultural and social influences on pain and pain management  - Notify physician/advanced practitioner if interventions unsuccessful or patient reports new pain  Outcome: Progressing     Problem: INFECTION - ADULT  Goal: Absence or prevention of progression during hospitalization  Description: INTERVENTIONS:  - Assess and monitor for signs and symptoms of infection  - Monitor lab/diagnostic results  - Monitor all insertion sites, i.e. indwelling lines, tubes, and drains  - Monitor endotracheal if appropriate and nasal secretions for changes in amount and color  - [place] appropriate cooling/warming therapies per order  - Administer medications as ordered  - Instruct and encourage patient and family to use good hand hygiene technique  -  Identify and instruct in appropriate isolation precautions for identified infection/condition  Outcome: Progressing     Problem: SAFETY ADULT  Goal: Patient will remain free of falls  Description: INTERVENTIONS:  - Educate patient/family on patient safety including physical limitations  - Instruct patient to call for assistance with activity   - Consult OT/PT to assist with strengthening/mobility   - Keep Call bell within reach  - Keep bed low and locked with side rails adjusted as appropriate  - Keep care items and personal belongings within reach  - Initiate and maintain comfort rounds  - Make Fall Risk Sign visible to staff  - Offer Toileting every 2 Hours, in advance of need  - Initiate/Maintain alarm  - Obtain necessary fall risk management equipment  - Apply yellow socks and bracelet for high fall risk patients  - Consider moving patient to room near nurses station  Outcome: Progressing  Goal: Maintain or return to baseline ADL function  Description: INTERVENTIONS:  -  Assess patient's ability to carry out ADLs; assess patient's baseline for ADL function and identify physical deficits which impact ability to perform ADLs (bathing, care of mouth/teeth, toileting, grooming, dressing, etc.)  - Assess/evaluate cause of self-care deficits   - Assess range of motion  - Assess patient's mobility; develop plan if impaired  - Assess patient's need for assistive devices and provide as appropriate  - Encourage maximum independence but intervene and supervise when necessary  - Involve family in performance of ADLs  - Assess for home care needs following discharge   - Consider OT consult to assist with ADL evaluation and planning for discharge  - Provide patient education as appropriate  Outcome: Progressing  Goal: Maintains/Returns to pre admission functional level  Description: INTERVENTIONS:  - Perform AM-PAC 6 Click Basic Mobility/ Daily Activity assessment daily.  - Set and communicate daily mobility goal to care team  and patient/family/caregiver.   - Collaborate with rehabilitation services on mobility goals if consulted  - Perform Range of Motion 3 times a day.  - Reposition patient every 2 hours.  - Dangle patient 3 times a day  - Stand patient 3 times a day  - Ambulate patient 3 times a day  - Out of bed to chair 3 times a day   - Out of bed for meals 3 times a day  - Out of bed for toileting  - Record patient progress and toleration of activity level   Outcome: Progressing     Problem: DISCHARGE PLANNING  Goal: Discharge to home or other facility with appropriate resources  Description: INTERVENTIONS:  - Identify barriers to discharge w/patient and caregiver  - Arrange for needed discharge resources and transportation as appropriate  - Identify discharge learning needs (meds, wound care, etc.)  - Arrange for interpretive services to assist at discharge as needed  - Refer to Case Management Department for coordinating discharge planning if the patient needs post-hospital services based on physician/advanced practitioner order or complex needs related to functional status, cognitive ability, or social support system  Outcome: Progressing     Problem: Knowledge Deficit  Goal: Patient/family/caregiver demonstrates understanding of disease process, treatment plan, medications, and discharge instructions  Description: Complete learning assessment and assess knowledge base.  Interventions:  - Provide teaching at level of understanding  - Provide teaching via preferred learning methods  Outcome: Progressing     Problem: Nutrition/Hydration-ADULT  Goal: Nutrient/Hydration intake appropriate for improving, restoring or maintaining nutritional needs  Description: Monitor and assess patient's nutrition/hydration status for malnutrition. Collaborate with interdisciplinary team and initiate plan and interventions as ordered.  Monitor patient's weight and dietary intake as ordered or per policy. Utilize nutrition screening tool and  intervene as necessary. Determine patient's food preferences and provide high-protein, high-caloric foods as appropriate.     INTERVENTIONS:  - Monitor oral intake, urinary output, labs, and treatment plans  - Assess nutrition and hydration status and recommend course of action  - Evaluate amount of meals eaten  - Assist patient with eating if necessary   - Allow adequate time for meals  - Recommend/ encourage appropriate diets, oral nutritional supplements, and vitamin/mineral supplements  - Order, calculate, and assess calorie counts as needed  - Recommend, monitor, and adjust tube feedings and TPN/PPN based on assessed needs  - Assess need for intravenous fluids  - Provide specific nutrition/hydration education as appropriate  - Include patient/family/caregiver in decisions related to nutrition  Outcome: Progressing     Problem: NEUROSENSORY - ADULT  Goal: Achieves maximal functionality and self care  Description: INTERVENTIONS  - Monitor swallowing and airway patency with patient fatigue and changes in neurological status  - Encourage and assist patient to increase activity and self care.   - Encourage visually impaired, hearing impaired and aphasic patients to use assistive/communication devices  Outcome: Progressing     Problem: GASTROINTESTINAL - ADULT  Goal: Maintains adequate nutritional intake  Description: INTERVENTIONS:  - Monitor percentage of each meal consumed  - Identify factors contributing to decreased intake, treat as appropriate  - Assist with meals as needed  - Monitor I&O, weight, and lab values if indicated  - Obtain nutrition services referral as needed  Outcome: Progressing     Problem: METABOLIC, FLUID AND ELECTROLYTES - ADULT  Goal: Electrolytes maintained within normal limits  Description: INTERVENTIONS:  - Monitor labs and assess patient for signs and symptoms of electrolyte imbalances  - Administer electrolyte replacement as ordered  - Monitor response to electrolyte replacements,  including repeat lab results as appropriate  - Instruct patient on fluid and nutrition as appropriate  Outcome: Progressing  Goal: Glucose maintained within target range  Description: INTERVENTIONS:  - Monitor Blood Glucose as ordered  - Assess for signs and symptoms of hyperglycemia and hypoglycemia  - Administer ordered medications to maintain glucose within target range  - Assess nutritional intake and initiate nutrition service referral as needed  Outcome: Progressing     Problem: SKIN/TISSUE INTEGRITY - ADULT  Goal: Skin Integrity remains intact(Skin Breakdown Prevention)  Description: Assess:  -Perform Jose assessment  -Clean and moisturize skin  -Inspect skin when repositioning, toileting, and assisting with ADLS  -Assess under medical devices   -Assess extremities for adequate circulation and sensation     Bed Management:  -Have minimal linens on bed & keep smooth, unwrinkled  -Change linens as needed when moist or perspiring  -Avoid sitting or lying in one position for more than 2 hours while in bed  -Keep HOB at 45 degrees     Toileting:  -Offer bedside commode  -Assess for incontinence   -Use incontinent care products after each incontinent episode     Activity:  -Mobilize patient 3 times a day  -Encourage activity and walks on unit  -Encourage or provide ROM exercises   -Turn and reposition patient every 2 Hours  -Use appropriate equipment to lift or move patient in bed  -Instruct/ Assist with weight shifting every 2 when out of bed in chair  -Consider limitation of chair time 2 hour intervals    Skin Care:  -Avoid use of baby powder, tape, friction and shearing, hot water or constrictive clothing  -Relieve pressure over bony prominences  -Do not massage red bony areas    Next Steps:  -Teach patient strategies to minimize risks   -Consider consults to  interdisciplinary teams  Outcome: Progressing

## 2025-02-08 NOTE — ASSESSMENT & PLAN NOTE
Reportedly 20-40 lb weight loss over the past year. Possibly related to poor nutrition in setting of etoh use disorder and chronic pancreatitis, however, cannot exclude malignant process. Patient agreeable to EGD/colonoscopy inpatient     - will plan for EGD/colonoscopy on Monday; OK for diet today, clear liquid diet tomorrow and start prep. NPO midnight for scopes Monday   - consider EUS for evaluation of chronic pancreatic duct dilation and pancreatitis in future

## 2025-02-08 NOTE — ASSESSMENT & PLAN NOTE
Lab Results   Component Value Date    HGBA1C 9.8 (H) 02/05/2025       Recent Labs     02/07/25  2107 02/07/25  2152 02/08/25  0730 02/08/25  1154   POCGLU 52* 109 332* 232*       Blood Sugar Average: Last 72 hrs:  (P) 193.5

## 2025-02-08 NOTE — PROGRESS NOTES
"Progress Note - Hospitalist   Name: Wes Araujo 54 y.o. male I MRN: [id number]  Unit/Bed#: [place] [address] -01 I Date of Admission: 2/5/2025   Date of Service: 2/8/2025 I Hospital Day: 3    Assessment & Plan  Hyperosmolar hyperglycemic state (HHS) ([facility])  Lab Results   Component Value Date    HGBA1C 9.8 (H) 02/05/2025     Recent Labs     02/07/25  2107 02/07/25  2152 02/08/25  0730 02/08/25  1154   POCGLU 52* 109 332* 232*     Blood Sugar Average: Last 72 hrs:  (P) 193.5    54 year old male presented to the ED due to hyperglycemia and confusion. Possibly due to uncontrolled hyperglycemia secondary to medication non-compliance.   Will decrease to 8U TID with meals given hypoglycemia.  Acute metabolic encephalopathy  On initial presentation to the ED, pt noted to be confused. Improved possibly due to hypertensive urgency, hyperglycemia, alcohol abuse    CT head, CT chest abdomen pelvis without acute findings.   There was evidence however of chronic pancreatitis with chronic pancreatic ductal dilation and intraductal calculi better appreciated on comparison MRI.    Appreciate GI recommendations. Consideration for possible EGD / colonoscopy on Monday  Hypertensive urgency  Pt blood pressure noted w/ SBP in the 200's upon arrival, now improved   Resolved   Transaminitis  Possibly due to alcohol abuse, hyperglycemia  Resolved    Recent Labs     02/05/25 2009 02/06/25  0634 02/07/25  0620   AST 60* 39 23   ALT 94* 69* 52   TBILI 0.51 0.40 0.56   ALKPHOS 325* 233* 207*     Alcohol abuse with history of withdrawal ([facility])  Pt reports drinking \"heavily\" daily. He reports he is unable to quantify his drinking. He states that his last drink was approximately 3 days prior to admission.  Pt did have previous admission 10/2024 with witnessed seizure thought to be related to alcohol withdrawal/hyperglycemia  [facility] protocol, likely will be out of withdrawal period in 24 hours.  CKD stage 3b, GFR 30-44 ml/min ([facility])  Lab Results "   Component Value Date    EGFR 38 2025    EGFR 50 2025    EGFR 58 2025    CREATININE 1.91 (H) 2025    CREATININE 1.54 (H) 2025    CREATININE 1.37 (H) 2025     Baseline of 1.1 ~ 1.54.  Elevated, continue monitoring for now. Urinary retention protocol    VTE Pharmacologic Prophylaxis: VTE Score: 1 Moderate Risk (Score 3-4) - Pharmacological DVT Prophylaxis Ordered: heparin.    Mobility:   Basic Mobility Inpatient Raw Score: 17  -[facility] Goal: 5: Stand one or more mins  -HL Achieved: 6: Walk 10 steps or more    Discussions with Specialists or Other Care Team Provider: GI    Education and Discussions with Family / Patient: patient    Current Length of Stay: 3 day(s)  Current Patient Status: Inpatient   Certification Statement: The patient will continue to require additional inpatient hospital stay due to possible EGD on monday  Discharge Plan: Monday vs tuesday    Code Status: Level 1 - Full Code    Subjective   Patient seen and examined. Still with epigastric discomfort. No new complaints overnight.     Objective   Vitals:   Temp (24hrs), Av °F (36.7 °C), Min:97.8 °F (36.6 °C), Max:98.4 °F (36.9 °C)    Temp:  [97.8 °F (36.6 °C)-98.4 °F (36.9 °C)] 97.9 °F (36.6 °C)  HR:  [] 98  Resp:  [17-20] 18  BP: (128-160)/(65-90) 128/65  SpO2:  [97 %-99 %] 98 %  Body mass index is 18.49 kg/m².     Input and Output Summary (last 24 hours):     Intake/Output Summary (Last 24 hours) at 2025 1322  Last data filed at 2025 0601  Gross per 24 hour   Intake 720 ml   Output --   Net 720 ml       Physical Exam  Vitals reviewed.   Constitutional:       General: He is not in acute distress.  HENT:      Head: Normocephalic.      Nose: Nose normal.      Mouth/Throat:      Mouth: Mucous membranes are moist.   Eyes:      General: No scleral icterus.  Cardiovascular:      Rate and Rhythm: Normal rate and regular rhythm.   Pulmonary:      Effort: Pulmonary effort is normal. No respiratory  distress.      Breath sounds: No wheezing.   Abdominal:      General: There is no distension.      Palpations: Abdomen is soft.      Tenderness: There is no abdominal tenderness.   Skin:     General: Skin is warm.   Neurological:      Mental Status: He is alert.   Psychiatric:         Mood and Affect: Mood normal.         Behavior: Behavior normal.       Lines/Drains:              Lab Results: I have reviewed the following results:   Results from last 7 days   Lab Units 02/08/25  0512 02/07/25 0620 02/06/25  0639   WBC Thousand/uL 11.65* 10.74* 17.30*   HEMOGLOBIN g/dL 10.5* 9.9* 10.6*   PLATELETS Thousands/uL 201 192 242   MCV fL 88 86 85     Results from last 7 days   Lab Units 02/08/25  0512 02/07/25 0620 02/06/25 0634 02/05/25 2009   SODIUM mmol/L 131* 129* 134* 123*   POTASSIUM mmol/L 4.8 3.8 3.5 4.0   CHLORIDE mmol/L 97 98 102 90*   CO2 mmol/L 26 25 25 25   ANION GAP mmol/L 8 6 7 8   BUN mg/dL 19 12 10 10   CREATININE mg/dL 1.91* 1.54* 1.37* 1.54*   CALCIUM mg/dL 8.8 8.1* 8.3* 8.4   ALBUMIN g/dL  --  2.7* 2.6* 3.1*   TOTAL BILIRUBIN mg/dL  --  0.56 0.40 0.51   ALK PHOS U/L  --  207* 233* 325*   ALT U/L  --  52 69* 94*   AST U/L  --  23 39 60*   EGFR ml/min/1.73sq m 38 50 58 50   GLUCOSE RANDOM mg/dL 301* 323* 172* 918*     Results from last 7 days   Lab Units 02/08/25  0512 02/07/25 0620 02/06/25 0634 02/05/25 2009   MAGNESIUM mg/dL 2.0 1.7* 1.9 1.8*   PHOSPHORUS mg/dL  --  2.6*  --   --          Results from last 7 days   Lab Units 02/06/25  0051 02/05/25  2211 02/05/25 2009   HS TNI 0HR ng/L  --   --  18   HS TNI 2HR ng/L  --  20  --    HS TNI 4HR ng/L 23  --   --               Results from last 7 days   Lab Units 02/08/25  1154 02/08/25  0730 02/07/25  2152 02/07/25  2107 02/07/25  1538 02/07/25  1113 02/07/25  0733 02/06/25  2057 02/06/25  1555 02/06/25  1119 02/06/25  0734 02/06/25  0410   POC GLUCOSE mg/dl 232* 332* 109 52* 220* 280* 317* 157* 260* 102 234* 172*     Results from last 7 days   Lab  Units 02/05/25 2009   HEMOGLOBIN A1C % 9.8*           Recent Cultures (last 7 days):         Imaging:  Reviewed radiology reports from this admission: no new imaging    Last 24 Hours Medication List:     Current Facility-Administered Medications:     acetaminophen (TYLENOL) tablet 650 mg, Q6H PRN    amLODIPine (NORVASC) tablet 5 mg, BID    folic acid (FOLVITE) tablet 1 mg, Daily    heparin (porcine) subcutaneous injection 5,000 Units, Q8H JAISON    hydrALAZINE (APRESOLINE) injection 10 mg, Q6H PRN    hydrALAZINE (APRESOLINE) tablet 25 mg, Q8H JAISON    insulin aspart protamine-insulin aspart (NovoLOG 70/30) 100 units/mL subcutaneous injection 8 Units, BID AC    insulin lispro (HumALOG/ADMELOG) 100 units/mL subcutaneous injection 1-5 Units, TID AC **AND** Fingerstick Glucose (POCT), TID AC    insulin lispro (HumALOG/ADMELOG) 100 units/mL subcutaneous injection 1-5 Units, HS    labetalol (NORMODYNE) injection 10 mg, Q6H PRN    multivitamin-minerals (CENTRUM) tablet 1 tablet, Daily    pancrelipase (Lip-Prot-Amyl) (CREON) delayed release capsule 24,000 Units, TID With Meals    pantoprazole (PROTONIX) EC tablet 40 mg, Daily Before Breakfast    thiamine tablet 100 mg, Daily      **Please Note: This note may have been constructed using a voice recognition system.**

## 2025-02-08 NOTE — ASSESSMENT & PLAN NOTE
"Pt reports drinking \"heavily\" daily. He reports he is unable to quantify his drinking. He states that his last drink was approximately 3 days prior to admission.  Pt did have previous admission 10/2024 with witnessed seizure thought to be related to alcohol withdrawal/hyperglycemia  [facility] protocol, likely will be out of withdrawal period in 24 hours.  "

## 2025-02-09 LAB
ANION GAP SERPL CALCULATED.3IONS-SCNC: 10 MMOL/L (ref 4–13)
BUN SERPL-MCNC: 25 MG/DL (ref 5–25)
CALCIUM SERPL-MCNC: 8.6 MG/DL (ref 8.4–10.2)
CHLORIDE SERPL-SCNC: 101 MMOL/L (ref 96–108)
CO2 SERPL-SCNC: 22 MMOL/L (ref 21–32)
CREAT SERPL-MCNC: 2.21 MG/DL (ref 0.6–1.3)
ERYTHROCYTE [DISTWIDTH] IN BLOOD BY AUTOMATED COUNT: 11.1 % (ref 11.6–15.1)
GFR SERPL CREATININE-BSD FRML MDRD: 32 ML/MIN/1.73SQ M
GLUCOSE SERPL-MCNC: 157 MG/DL (ref 65–140)
GLUCOSE SERPL-MCNC: 198 MG/DL (ref 65–140)
GLUCOSE SERPL-MCNC: 230 MG/DL (ref 65–140)
GLUCOSE SERPL-MCNC: 265 MG/DL (ref 65–140)
GLUCOSE SERPL-MCNC: 275 MG/DL (ref 65–140)
GLUCOSE SERPL-MCNC: 36 MG/DL (ref 65–140)
GLUCOSE SERPL-MCNC: 96 MG/DL (ref 65–140)
HCT VFR BLD AUTO: 26.1 % (ref 36.5–49.3)
HGB BLD-MCNC: 9.2 G/DL (ref 12–17)
MAGNESIUM SERPL-MCNC: 1.9 MG/DL (ref 1.9–2.7)
MCH RBC QN AUTO: 31.9 PG (ref 26.8–34.3)
MCHC RBC AUTO-ENTMCNC: 35.2 G/DL (ref 31.4–37.4)
MCV RBC AUTO: 91 FL (ref 82–98)
PLATELET # BLD AUTO: 232 THOUSANDS/UL (ref 149–390)
PMV BLD AUTO: 12 FL (ref 8.9–12.7)
POTASSIUM SERPL-SCNC: 4.3 MMOL/L (ref 3.5–5.3)
RBC # BLD AUTO: 2.88 MILLION/UL (ref 3.88–5.62)
SODIUM SERPL-SCNC: 133 MMOL/L (ref 135–147)
WBC # BLD AUTO: 12.16 THOUSAND/UL (ref 4.31–10.16)

## 2025-02-09 PROCEDURE — 80048 BASIC METABOLIC PNL TOTAL CA: CPT | Performed by: STUDENT IN AN ORGANIZED HEALTH CARE EDUCATION/TRAINING PROGRAM

## 2025-02-09 PROCEDURE — 99232 SBSQ HOSP IP/OBS MODERATE 35: CPT | Performed by: STUDENT IN AN ORGANIZED HEALTH CARE EDUCATION/TRAINING PROGRAM

## 2025-02-09 PROCEDURE — 83735 ASSAY OF MAGNESIUM: CPT | Performed by: STUDENT IN AN ORGANIZED HEALTH CARE EDUCATION/TRAINING PROGRAM

## 2025-02-09 PROCEDURE — 85027 COMPLETE CBC AUTOMATED: CPT | Performed by: STUDENT IN AN ORGANIZED HEALTH CARE EDUCATION/TRAINING PROGRAM

## 2025-02-09 PROCEDURE — 99232 SBSQ HOSP IP/OBS MODERATE 35: CPT | Performed by: INTERNAL MEDICINE

## 2025-02-09 PROCEDURE — 82948 REAGENT STRIP/BLOOD GLUCOSE: CPT

## 2025-02-09 RX ORDER — DEXTROSE MONOHYDRATE 25 G/50ML
INJECTION, SOLUTION INTRAVENOUS
Status: COMPLETED
Start: 2025-02-09 | End: 2025-02-09

## 2025-02-09 RX ORDER — INSULIN ASPART 100 [IU]/ML
5 INJECTION, SUSPENSION SUBCUTANEOUS ONCE
Status: COMPLETED | OUTPATIENT
Start: 2025-02-09 | End: 2025-02-09

## 2025-02-09 RX ADMIN — HYDRALAZINE HYDROCHLORIDE 25 MG: 25 TABLET ORAL at 12:48

## 2025-02-09 RX ADMIN — HEPARIN SODIUM 5000 UNITS: 5000 INJECTION INTRAVENOUS; SUBCUTANEOUS at 12:47

## 2025-02-09 RX ADMIN — HYDRALAZINE HYDROCHLORIDE 25 MG: 25 TABLET ORAL at 22:22

## 2025-02-09 RX ADMIN — INSULIN LISPRO 2 UNITS: 100 INJECTION, SOLUTION INTRAVENOUS; SUBCUTANEOUS at 12:46

## 2025-02-09 RX ADMIN — INSULIN LISPRO 2 UNITS: 100 INJECTION, SOLUTION INTRAVENOUS; SUBCUTANEOUS at 08:58

## 2025-02-09 RX ADMIN — Medication 1 TABLET: at 08:57

## 2025-02-09 RX ADMIN — INSULIN ASPART 8 UNITS: 100 INJECTION, SUSPENSION SUBCUTANEOUS at 08:58

## 2025-02-09 RX ADMIN — PANTOPRAZOLE SODIUM 40 MG: 40 TABLET, DELAYED RELEASE ORAL at 07:05

## 2025-02-09 RX ADMIN — INSULIN ASPART 5 UNITS: 100 INJECTION, SUSPENSION SUBCUTANEOUS at 17:45

## 2025-02-09 RX ADMIN — PANCRELIPASE 24000 UNITS: 120000; 24000; 76000 CAPSULE, DELAYED RELEASE PELLETS ORAL at 12:46

## 2025-02-09 RX ADMIN — AMLODIPINE BESYLATE 5 MG: 5 TABLET ORAL at 08:57

## 2025-02-09 RX ADMIN — HEPARIN SODIUM 5000 UNITS: 5000 INJECTION INTRAVENOUS; SUBCUTANEOUS at 07:05

## 2025-02-09 RX ADMIN — AMLODIPINE BESYLATE 5 MG: 5 TABLET ORAL at 17:43

## 2025-02-09 RX ADMIN — HEPARIN SODIUM 5000 UNITS: 5000 INJECTION INTRAVENOUS; SUBCUTANEOUS at 22:22

## 2025-02-09 RX ADMIN — BISACODYL 10 MG: 5 TABLET, COATED ORAL at 17:42

## 2025-02-09 RX ADMIN — HYDRALAZINE HYDROCHLORIDE 25 MG: 25 TABLET ORAL at 07:05

## 2025-02-09 RX ADMIN — PANCRELIPASE 24000 UNITS: 120000; 24000; 76000 CAPSULE, DELAYED RELEASE PELLETS ORAL at 08:57

## 2025-02-09 RX ADMIN — Medication 100 MG: at 08:57

## 2025-02-09 RX ADMIN — PANCRELIPASE 24000 UNITS: 120000; 24000; 76000 CAPSULE, DELAYED RELEASE PELLETS ORAL at 17:42

## 2025-02-09 RX ADMIN — POLYETHYLENE GLYCOL 3350, SODIUM SULFATE ANHYDROUS, SODIUM BICARBONATE, SODIUM CHLORIDE, POTASSIUM CHLORIDE 4000 ML: 236; 22.74; 6.74; 5.86; 2.97 POWDER, FOR SOLUTION ORAL at 17:12

## 2025-02-09 RX ADMIN — DEXTROSE MONOHYDRATE 50 ML: 25 INJECTION, SOLUTION INTRAVENOUS at 21:09

## 2025-02-09 RX ADMIN — FOLIC ACID 1 MG: 1 TABLET ORAL at 08:57

## 2025-02-09 NOTE — ASSESSMENT & PLAN NOTE
"Pt reports drinking \"heavily\" daily. He reports he is unable to quantify his drinking. He states that his last drink was approximately 3 days prior to admission.  Pt did have previous admission 10/2024 with witnessed seizure thought to be related to alcohol withdrawal/hyperglycemia  [facility] protocol discontinued  "

## 2025-02-09 NOTE — ASSESSMENT & PLAN NOTE
Reportedly 20-40 lb weight loss over the past year. Possibly related to poor nutrition in setting of etoh use disorder and chronic pancreatitis, however, cannot exclude malignant process. Patient agreeable to EGD/colonoscopy inpatient. Would benefit from EUS as well in setting of chronic pancreatitis with dilation of PD     - will plan for EGD/colonoscopy on Monday; OK for diet today, clear liquid diet tomorrow and start prep. NPO midnight for scopes Monday   - consider concurrent EUS for evaluation of chronic pancreatic duct dilation and pancreatitis tomorrow vs outpatient basis

## 2025-02-09 NOTE — ASSESSMENT & PLAN NOTE
CT head, CT chest abdomen pelvis without acute findings.   There was evidence however of chronic pancreatitis with chronic pancreatic ductal dilation and intraductal calculi better appreciated on comparison MRI.    Appreciate GI recommendations. For EGD / colonoscopy / possible EUS tomororw. Clear liquid diet NPO after midnight.

## 2025-02-09 NOTE — ASSESSMENT & PLAN NOTE
On initial presentation to the ED, pt noted to be confused. Improved possibly due to hypertensive urgency, hyperglycemia, alcohol abuse. Improved.

## 2025-02-09 NOTE — PLAN OF CARE
Problem: Potential for Falls  Goal: Patient will remain free of falls  Description: INTERVENTIONS:  - Educate patient/family on patient safety including physical limitations  - Instruct patient to call for assistance with activity   - Consult OT/PT to assist with strengthening/mobility   - Keep Call bell within reach  - Keep bed low and locked with side rails adjusted as appropriate  - Keep care items and personal belongings within reach  - Initiate and maintain comfort rounds  - Make Fall Risk Sign visible to staff  - Offer Toileting every 2 Hours, in advance of need  - Initiate/Maintain bed alarm  - Obtain necessary fall risk management equipment: bed rails, alarms  - Apply yellow socks and bracelet for high fall risk patients  - Consider moving patient to room near nurses station  Outcome: Progressing     Problem: PAIN - ADULT  Goal: Verbalizes/displays adequate comfort level or baseline comfort level  Description: Interventions:  - Encourage patient to monitor pain and request assistance  - Assess pain using appropriate pain scale  - Administer analgesics based on type and severity of pain and evaluate response  - Implement non-pharmacological measures as appropriate and evaluate response  - Consider cultural and social influences on pain and pain management  - Notify physician/advanced practitioner if interventions unsuccessful or patient reports new pain  Outcome: Progressing     Problem: INFECTION - ADULT  Goal: Absence or prevention of progression during hospitalization  Description: INTERVENTIONS:  - Assess and monitor for signs and symptoms of infection  - Monitor lab/diagnostic results  - Monitor all insertion sites, i.e. indwelling lines, tubes, and drains  - Monitor endotracheal if appropriate and nasal secretions for changes in amount and color  - [place] appropriate cooling/warming therapies per order  - Administer medications as ordered  - Instruct and encourage patient and family to use good  hand hygiene technique  - Identify and instruct in appropriate isolation precautions for identified infection/condition  Outcome: Progressing     Problem: SAFETY ADULT  Goal: Patient will remain free of falls  Description: INTERVENTIONS:  - Educate patient/family on patient safety including physical limitations  - Instruct patient to call for assistance with activity   - Consult OT/PT to assist with strengthening/mobility   - Keep Call bell within reach  - Keep bed low and locked with side rails adjusted as appropriate  - Keep care items and personal belongings within reach  - Initiate and maintain comfort rounds  - Make Fall Risk Sign visible to staff  - Offer Toileting every 2 Hours, in advance of need  - Initiate/Maintain bed alarm  - Obtain necessary fall risk management equipment: bed alarms, bed rails  - Apply yellow socks and bracelet for high fall risk patients  - Consider moving patient to room near nurses station  Outcome: Progressing  Goal: Maintain or return to baseline ADL function  Description: INTERVENTIONS:  -  Assess patient's ability to carry out ADLs; assess patient's baseline for ADL function and identify physical deficits which impact ability to perform ADLs (bathing, care of mouth/teeth, toileting, grooming, dressing, etc.)  - Assess/evaluate cause of self-care deficits   - Assess range of motion  - Assess patient's mobility; develop plan if impaired  - Assess patient's need for assistive devices and provide as appropriate  - Encourage maximum independence but intervene and supervise when necessary  - Involve family in performance of ADLs  - Assess for home care needs following discharge   - Consider OT consult to assist with ADL evaluation and planning for discharge  - Provide patient education as appropriate  Outcome: Progressing  Goal: Maintains/Returns to pre admission functional level  Description: INTERVENTIONS:  - Perform AM-PAC 6 Click Basic Mobility/ Daily Activity assessment daily.  -  Set and communicate daily mobility goal to care team and patient/family/caregiver.   - Collaborate with rehabilitation services on mobility goals if consulted  - Dangle patient 4 times a day  - Stand patient 4 times a day  - Ambulate patient 3 times a day  - Out of bed to chair 2 times a day   - Out of bed for meals 2 times a day  - Out of bed for toileting  - Record patient progress and toleration of activity level   Outcome: Progressing     Problem: DISCHARGE PLANNING  Goal: Discharge to home or other facility with appropriate resources  Description: INTERVENTIONS:  - Identify barriers to discharge w/patient and caregiver  - Arrange for needed discharge resources and transportation as appropriate  - Identify discharge learning needs (meds, wound care, etc.)  - Arrange for interpretive services to assist at discharge as needed  - Refer to Case Management Department for coordinating discharge planning if the patient needs post-hospital services based on physician/advanced practitioner order or complex needs related to functional status, cognitive ability, or social support system  Outcome: Progressing     Problem: Knowledge Deficit  Goal: Patient/family/caregiver demonstrates understanding of disease process, treatment plan, medications, and discharge instructions  Description: Complete learning assessment and assess knowledge base.  Interventions:  - Provide teaching at level of understanding  - Provide teaching via preferred learning methods  Outcome: Progressing     Problem: Nutrition/Hydration-ADULT  Goal: Nutrient/Hydration intake appropriate for improving, restoring or maintaining nutritional needs  Description: Monitor and assess patient's nutrition/hydration status for malnutrition. Collaborate with interdisciplinary team and initiate plan and interventions as ordered.  Monitor patient's weight and dietary intake as ordered or per policy. Utilize nutrition screening tool and intervene as necessary. Determine  patient's food preferences and provide high-protein, high-caloric foods as appropriate.     INTERVENTIONS:  - Monitor oral intake, urinary output, labs, and treatment plans  - Assess nutrition and hydration status and recommend course of action  - Evaluate amount of meals eaten  - Assist patient with eating if necessary   - Allow adequate time for meals  - Recommend/ encourage appropriate diets, oral nutritional supplements, and vitamin/mineral supplements  - Order, calculate, and assess calorie counts as needed  - Recommend, monitor, and adjust tube feedings and TPN/PPN based on assessed needs  - Assess need for intravenous fluids  - Provide specific nutrition/hydration education as appropriate  - Include patient/family/caregiver in decisions related to nutrition  Outcome: Progressing     Problem: NEUROSENSORY - ADULT  Goal: Achieves maximal functionality and self care  Description: INTERVENTIONS  - Monitor swallowing and airway patency with patient fatigue and changes in neurological status  - Encourage and assist patient to increase activity and self care.   - Encourage visually impaired, hearing impaired and aphasic patients to use assistive/communication devices  Outcome: Progressing     Problem: GASTROINTESTINAL - ADULT  Goal: Maintains adequate nutritional intake  Description: INTERVENTIONS:  - Monitor percentage of each meal consumed  - Identify factors contributing to decreased intake, treat as appropriate  - Assist with meals as needed  - Monitor I&O, weight, and lab values if indicated  - Obtain nutrition services referral as needed  Outcome: Progressing     Problem: METABOLIC, FLUID AND ELECTROLYTES - ADULT  Goal: Electrolytes maintained within normal limits  Description: INTERVENTIONS:  - Monitor labs and assess patient for signs and symptoms of electrolyte imbalances  - Administer electrolyte replacement as ordered  - Monitor response to electrolyte replacements, including repeat lab results as  appropriate  - Instruct patient on fluid and nutrition as appropriate  Outcome: Progressing  Goal: Glucose maintained within target range  Description: INTERVENTIONS:  - Monitor Blood Glucose as ordered  - Assess for signs and symptoms of hyperglycemia and hypoglycemia  - Administer ordered medications to maintain glucose within target range  - Assess nutritional intake and initiate nutrition service referral as needed  Outcome: Progressing     Problem: SKIN/TISSUE INTEGRITY - ADULT  Goal: Skin Integrity remains intact(Skin Breakdown Prevention)  Description: Assess:  -Perform Jose assessment every shift  -Clean and moisturize skin as needed  -Inspect skin when repositioning, toileting, and assisting with ADLS  -Assess under medical devices such as masimo every 2H  -Assess extremities for adequate circulation and sensation     Bed Management:  -Have minimal linens on bed & keep smooth, unwrinkled  -Change linens as needed when moist or perspiring  -Avoid sitting or lying in one position for more than 2 hours while in bed  -Keep HOB at >30 degrees     Toileting:  -Offer bedside commode    Activity:  -Mobilize patient 3 times a day  -Encourage activity and walks on unit  -Encourage or provide ROM exercises   -Use appropriate equipment to lift or move patient in bed  -Instruct/ Assist with weight shifting every 15 min when out of bed in chair  -Consider limitation of chair time to 2 hour intervals    Skin Care:  -Avoid use of baby powder, tape, friction and shearing, hot water or constrictive clothing  -Relieve pressure over bony prominences using mepilex foams  -Do not massage red bony areas    Next Steps:  -Teach patient strategies to minimize risks such as call, don't fall   -Consider consults to  interdisciplinary teams such as PT/OT  Outcome: Progressing

## 2025-02-09 NOTE — PROGRESS NOTES
Progress Note - Gastroenterology   Name: Wes Araujo 54 y.o. male I MRN: [id number]  Unit/Bed#: [place] [address] -01 I Date of Admission: 2/5/2025   Date of Service: 2/9/2025 I Hospital Day: 4    Assessment & Plan  Hyperosmolar hyperglycemic state (HHS) (HCC)  Lab Results   Component Value Date    HGBA1C 9.8 (H) 02/05/2025       Recent Labs     02/08/25  2045 02/09/25  0407 02/09/25  0802 02/09/25  1130   POCGLU 175* 198* 265* 275*       Blood Sugar Average: Last 72 hrs:  (P) 192.2170714442052383  Improving, management per primary   Alcohol abuse with history of withdrawal (HCC)  [facility] protocol   Transaminitis  Improving; monitor. No biliary or hepatic pathology on admission CT   Unintentional weight loss  Reportedly 20-40 lb weight loss over the past year. Possibly related to poor nutrition in setting of etoh use disorder and chronic pancreatitis, however, cannot exclude malignant process. Patient agreeable to EGD/colonoscopy inpatient. Would benefit from EUS as well in setting of chronic pancreatitis with dilation of PD     - will plan for EGD/colonoscopy on Monday; OK for diet today, clear liquid diet tomorrow and start prep. NPO midnight for scopes Monday   - consider concurrent EUS for evaluation of chronic pancreatic duct dilation and pancreatitis tomorrow vs outpatient basis    Chronic pancreatitis (HCC)  As above; pain control per primary team     Gastroenterology service will follow.    Subjective   Evaluated at bedside. Pain somewhat improved. Clear liquid diet tray at bedside.     Objective :  Temp:  [98.1 °F (36.7 °C)-98.9 °F (37.2 °C)] 98.6 °F (37 °C)  HR:  [] 94  BP: (115-141)/(64-77) 131/74  Resp:  [16-19] 19  SpO2:  [96 %-99 %] 98 %  O2 Device: None (Room air)    Physical Exam  Alert, oriented  In NAD  Abdomen soft, nontender to palpation today     Lab Results: I have reviewed the following results:CBC/BMP:   .     02/09/25  0458   WBC 12.16*   HGB 9.2*   HCT 26.1*      SODIUM 133*   K 4.3       CO2 22   BUN 25   CREATININE 2.21*   GLUC 230*   MG 1.9

## 2025-02-09 NOTE — PROGRESS NOTES
"Progress Note - Hospitalist   Name: Wes Araujo 54 y.o. male I MRN: [id number]  Unit/Bed#: [place] [address] -01 I Date of Admission: 2/5/2025   Date of Service: 2/9/2025 I Hospital Day: 4    Assessment & Plan  Hyperosmolar hyperglycemic state (HHS) ([facility])  Lab Results   Component Value Date    HGBA1C 9.8 (H) 02/05/2025     Recent Labs     02/08/25  2045 02/09/25  0407 02/09/25  0802 02/09/25  1130   POCGLU 175* 198* 265* 275*     Blood Sugar Average: Last 72 hrs:  (P) [id number]    54 year old male presented to the ED due to hyperglycemia and confusion. Possibly due to uncontrolled hyperglycemia secondary to medication non-compliance.   Will decrease to 8U TID with meals given hypoglycemia.  Acute metabolic encephalopathy  On initial presentation to the ED, pt noted to be confused. Improved possibly due to hypertensive urgency, hyperglycemia, alcohol abuse. Improved.    Unintentional weight loss  CT head, CT chest abdomen pelvis without acute findings.   There was evidence however of chronic pancreatitis with chronic pancreatic ductal dilation and intraductal calculi better appreciated on comparison MRI.    Appreciate GI recommendations. For EGD / colonoscopy / possible EUS tomororw. Clear liquid diet NPO after midnight.  Hypertensive urgency  Pt blood pressure noted w/ SBP in the 200's upon arrival, now improved   Resolved   Transaminitis  Possibly due to alcohol abuse, hyperglycemia  Resolved    Recent Labs     02/07/25  0620   AST 23   ALT 52   TBILI 0.56   ALKPHOS 207*     Alcohol abuse with history of withdrawal ([facility])  Pt reports drinking \"heavily\" daily. He reports he is unable to quantify his drinking. He states that his last drink was approximately 3 days prior to admission.  Pt did have previous admission 10/2024 with witnessed seizure thought to be related to alcohol withdrawal/hyperglycemia  [facility] protocol discontinued  CKD stage 3b, GFR 30-44 ml/min ([facility])  Lab Results   Component Value Date    " EGFR 32 2025    EGFR 38 2025    EGFR 50 2025    CREATININE 2.21 (H) 2025    CREATININE 1.91 (H) 2025    CREATININE 1.54 (H) 2025     Baseline of 1.1 ~ 1.54.  Elevated, continue monitoring for now. Urinary retention protocol  Chronic pancreatitis (HCC)      VTE Pharmacologic Prophylaxis: VTE Score: 1 Moderate Risk (Score 3-4) - Pharmacological DVT Prophylaxis Ordered: heparin.    Mobility:   Basic Mobility Inpatient Raw Score: 22  -[facility] Goal: 7: Walk 25 feet or more  -HL Achieved: 7: Walk 25 feet or more    Discussions with Specialists or Other Care Team Provider: gi    Education and Discussions with Family / Patient: patient    Current Length of Stay: 4 day(s)  Current Patient Status: Inpatient   Certification Statement: The patient will continue to require additional inpatient hospital stay due to gi procedure  Discharge Plan: 24 to 48 hours    Code Status: Level 1 - Full Code    Subjective   Patient seen and examined. No new issues overnight.    Objective   Vitals:   Temp (24hrs), Av.5 °F (36.9 °C), Min:98.1 °F (36.7 °C), Max:98.9 °F (37.2 °C)    Temp:  [98.1 °F (36.7 °C)-98.9 °F (37.2 °C)] 98.4 °F (36.9 °C)  HR:  [] 99  Resp:  [16-19] 19  BP: (115-144)/(64-77) 144/72  SpO2:  [96 %-99 %] 99 %  Body mass index is 18.52 kg/m².     Input and Output Summary (last 24 hours):     Intake/Output Summary (Last 24 hours) at 2025 1330  Last data filed at 2025 1321  Gross per 24 hour   Intake 1438 ml   Output --   Net 1438 ml       Physical Exam  Vitals reviewed.   Constitutional:       General: He is not in acute distress.  HENT:      Head: Normocephalic.      Nose: Nose normal.      Mouth/Throat:      Mouth: Mucous membranes are moist.   Eyes:      General: No scleral icterus.  Cardiovascular:      Rate and Rhythm: Normal rate and regular rhythm.   Pulmonary:      Effort: Pulmonary effort is normal. No respiratory distress.      Breath sounds: No wheezing.    Abdominal:      General: There is no distension.      Palpations: Abdomen is soft.      Tenderness: There is abdominal tenderness.   Skin:     General: Skin is warm.   Neurological:      Mental Status: He is alert.   Psychiatric:         Mood and Affect: Mood normal.         Behavior: Behavior normal.       Lines/Drains:              Lab Results: I have reviewed the following results:   Results from last 7 days   Lab Units 02/09/25 0458 02/08/25 0512 02/07/25 0620   WBC Thousand/uL 12.16* 11.65* 10.74*   HEMOGLOBIN g/dL 9.2* 10.5* 9.9*   PLATELETS Thousands/uL 232 201 192   MCV fL 91 88 86     Results from last 7 days   Lab Units 02/09/25 0458 02/08/25 0512 02/07/25 0620 02/06/25 0634 02/05/25 2009   SODIUM mmol/L 133* 131* 129* 134* 123*   POTASSIUM mmol/L 4.3 4.8 3.8 3.5 4.0   CHLORIDE mmol/L 101 97 98 102 90*   CO2 mmol/L 22 26 25 25 25   ANION GAP mmol/L 10 8 6 7 8   BUN mg/dL 25 19 12 10 10   CREATININE mg/dL 2.21* 1.91* 1.54* 1.37* 1.54*   CALCIUM mg/dL 8.6 8.8 8.1* 8.3* 8.4   ALBUMIN g/dL  --   --  2.7* 2.6* 3.1*   TOTAL BILIRUBIN mg/dL  --   --  0.56 0.40 0.51   ALK PHOS U/L  --   --  207* 233* 325*   ALT U/L  --   --  52 69* 94*   AST U/L  --   --  23 39 60*   EGFR ml/min/1.73sq m 32 38 50 58 50   GLUCOSE RANDOM mg/dL 230* 301* 323* 172* 918*     Results from last 7 days   Lab Units 02/09/25 0458 02/08/25 0512 02/07/25 0620 02/06/25 0634 02/05/25 2009   MAGNESIUM mg/dL 1.9 2.0 1.7* 1.9 1.8*   PHOSPHORUS mg/dL  --   --  2.6*  --   --          Results from last 7 days   Lab Units 02/06/25  0051 02/05/25 2211 02/05/25  2009   HS TNI 0HR ng/L  --   --  18   HS TNI 2HR ng/L  --  20  --    HS TNI 4HR ng/L 23  --   --               Results from last 7 days   Lab Units 02/09/25  1130 02/09/25  0802 02/09/25  0407 02/08/25  2045 02/08/25  1619 02/08/25  1543 02/08/25  1154 02/08/25  0730 02/07/25  2152 02/07/25  2107 02/07/25  1538 02/07/25  1113   POC GLUCOSE mg/dl 275* 265* 198* 175* 78 60* 232*  332* 109 52* 220* 280*     Results from last 7 days   Lab Units 02/05/25 2009   HEMOGLOBIN A1C % 9.8*           Recent Cultures (last 7 days):         Imaging:  Reviewed radiology reports from this admission: no new imaging    Last 24 Hours Medication List:     Current Facility-Administered Medications:     acetaminophen (TYLENOL) tablet 650 mg, Q6H PRN    amLODIPine (NORVASC) tablet 5 mg, BID    bisacodyl (DULCOLAX) EC tablet 10 mg, Once    folic acid (FOLVITE) tablet 1 mg, Daily    heparin (porcine) subcutaneous injection 5,000 Units, Q8H JAISON    hydrALAZINE (APRESOLINE) injection 10 mg, Q6H PRN    hydrALAZINE (APRESOLINE) tablet 25 mg, Q8H JAISON    insulin aspart protamine-insulin aspart (NovoLOG 70/30) 100 units/mL subcutaneous injection 8 Units, BID AC    insulin lispro (HumALOG/ADMELOG) 100 units/mL subcutaneous injection 1-5 Units, TID AC **AND** Fingerstick Glucose (POCT), TID AC    insulin lispro (HumALOG/ADMELOG) 100 units/mL subcutaneous injection 1-5 Units, HS    labetalol (NORMODYNE) injection 10 mg, Q6H PRN    multivitamin-minerals (CENTRUM) tablet 1 tablet, Daily    pancrelipase (Lip-Prot-Amyl) (CREON) delayed release capsule 24,000 Units, TID With Meals    pantoprazole (PROTONIX) EC tablet 40 mg, Daily Before Breakfast    polyethylene glycol (GOLYTELY) bowel prep 4,000 mL, Once    thiamine tablet 100 mg, Daily      **Please Note: This note may have been constructed using a voice recognition system.**

## 2025-02-09 NOTE — PLAN OF CARE
Problem: Potential for Falls  Goal: Patient will remain free of falls  Description: INTERVENTIONS:  - Educate patient/family on patient safety including physical limitations  - Instruct patient to call for assistance with activity   - Consult OT/PT to assist with strengthening/mobility   - Keep Call bell within reach  - Keep bed low and locked with side rails adjusted as appropriate  - Keep care items and personal belongings within reach  - Initiate and maintain comfort rounds  - Make Fall Risk Sign visible to staff  - Offer Toileting every 2 Hours, in advance of need  - Initiate/Maintain alarm  - Obtain necessary fall risk management equipment  - Apply yellow socks and bracelet for high fall risk patients  - Consider moving patient to room near nurses station  Outcome: Progressing     Problem: PAIN - ADULT  Goal: Verbalizes/displays adequate comfort level or baseline comfort level  Description: Interventions:  - Encourage patient to monitor pain and request assistance  - Assess pain using appropriate pain scale  - Administer analgesics based on type and severity of pain and evaluate response  - Implement non-pharmacological measures as appropriate and evaluate response  - Consider cultural and social influences on pain and pain management  - Notify physician/advanced practitioner if interventions unsuccessful or patient reports new pain  Outcome: Progressing     Problem: INFECTION - ADULT  Goal: Absence or prevention of progression during hospitalization  Description: INTERVENTIONS:  - Assess and monitor for signs and symptoms of infection  - Monitor lab/diagnostic results  - Monitor all insertion sites, i.e. indwelling lines, tubes, and drains  - Monitor endotracheal if appropriate and nasal secretions for changes in amount and color  - Strawberry appropriate cooling/warming therapies per order  - Administer medications as ordered  - Instruct and encourage patient and family to use good hand hygiene technique  -  Identify and instruct in appropriate isolation precautions for identified infection/condition  Outcome: Progressing     Problem: SAFETY ADULT  Goal: Patient will remain free of falls  Description: INTERVENTIONS:  - Educate patient/family on patient safety including physical limitations  - Instruct patient to call for assistance with activity   - Consult OT/PT to assist with strengthening/mobility   - Keep Call bell within reach  - Keep bed low and locked with side rails adjusted as appropriate  - Keep care items and personal belongings within reach  - Initiate and maintain comfort rounds  - Make Fall Risk Sign visible to staff  - Offer Toileting every 2 Hours, in advance of need  - Initiate/Maintain alarm  - Obtain necessary fall risk management equipment  - Apply yellow socks and bracelet for high fall risk patients  - Consider moving patient to room near nurses station  Outcome: Progressing  Goal: Maintain or return to baseline ADL function  Description: INTERVENTIONS:  -  Assess patient's ability to carry out ADLs; assess patient's baseline for ADL function and identify physical deficits which impact ability to perform ADLs (bathing, care of mouth/teeth, toileting, grooming, dressing, etc.)  - Assess/evaluate cause of self-care deficits   - Assess range of motion  - Assess patient's mobility; develop plan if impaired  - Assess patient's need for assistive devices and provide as appropriate  - Encourage maximum independence but intervene and supervise when necessary  - Involve family in performance of ADLs  - Assess for home care needs following discharge   - Consider OT consult to assist with ADL evaluation and planning for discharge  - Provide patient education as appropriate  Outcome: Progressing  Goal: Maintains/Returns to pre admission functional level  Description: INTERVENTIONS:  - Perform AM-PAC 6 Click Basic Mobility/ Daily Activity assessment daily.  - Set and communicate daily mobility goal to care team  and patient/family/caregiver.   - Collaborate with rehabilitation services on mobility goals if consulted  - Perform Range of Motion 3 times a day.  - Reposition patient every 2 hours.  - Dangle patient 3 times a day  - Stand patient 3 times a day  - Ambulate patient 3 times a day  - Out of bed to chair 3 times a day   - Out of bed for meals 3 times a day  - Out of bed for toileting  - Record patient progress and toleration of activity level   Outcome: Progressing     Problem: DISCHARGE PLANNING  Goal: Discharge to home or other facility with appropriate resources  Description: INTERVENTIONS:  - Identify barriers to discharge w/patient and caregiver  - Arrange for needed discharge resources and transportation as appropriate  - Identify discharge learning needs (meds, wound care, etc.)  - Arrange for interpretive services to assist at discharge as needed  - Refer to Case Management Department for coordinating discharge planning if the patient needs post-hospital services based on physician/advanced practitioner order or complex needs related to functional status, cognitive ability, or social support system  Outcome: Progressing     Problem: Knowledge Deficit  Goal: Patient/family/caregiver demonstrates understanding of disease process, treatment plan, medications, and discharge instructions  Description: Complete learning assessment and assess knowledge base.  Interventions:  - Provide teaching at level of understanding  - Provide teaching via preferred learning methods  Outcome: Progressing     Problem: Nutrition/Hydration-ADULT  Goal: Nutrient/Hydration intake appropriate for improving, restoring or maintaining nutritional needs  Description: Monitor and assess patient's nutrition/hydration status for malnutrition. Collaborate with interdisciplinary team and initiate plan and interventions as ordered.  Monitor patient's weight and dietary intake as ordered or per policy. Utilize nutrition screening tool and  intervene as necessary. Determine patient's food preferences and provide high-protein, high-caloric foods as appropriate.     INTERVENTIONS:  - Monitor oral intake, urinary output, labs, and treatment plans  - Assess nutrition and hydration status and recommend course of action  - Evaluate amount of meals eaten  - Assist patient with eating if necessary   - Allow adequate time for meals  - Recommend/ encourage appropriate diets, oral nutritional supplements, and vitamin/mineral supplements  - Order, calculate, and assess calorie counts as needed  - Recommend, monitor, and adjust tube feedings and TPN/PPN based on assessed needs  - Assess need for intravenous fluids  - Provide specific nutrition/hydration education as appropriate  - Include patient/family/caregiver in decisions related to nutrition  Outcome: Progressing     Problem: NEUROSENSORY - ADULT  Goal: Achieves maximal functionality and self care  Description: INTERVENTIONS  - Monitor swallowing and airway patency with patient fatigue and changes in neurological status  - Encourage and assist patient to increase activity and self care.   - Encourage visually impaired, hearing impaired and aphasic patients to use assistive/communication devices  Outcome: Progressing     Problem: GASTROINTESTINAL - ADULT  Goal: Maintains adequate nutritional intake  Description: INTERVENTIONS:  - Monitor percentage of each meal consumed  - Identify factors contributing to decreased intake, treat as appropriate  - Assist with meals as needed  - Monitor I&O, weight, and lab values if indicated  - Obtain nutrition services referral as needed  Outcome: Progressing     Problem: METABOLIC, FLUID AND ELECTROLYTES - ADULT  Goal: Electrolytes maintained within normal limits  Description: INTERVENTIONS:  - Monitor labs and assess patient for signs and symptoms of electrolyte imbalances  - Administer electrolyte replacement as ordered  - Monitor response to electrolyte replacements,  including repeat lab results as appropriate  - Instruct patient on fluid and nutrition as appropriate  Outcome: Progressing  Goal: Glucose maintained within target range  Description: INTERVENTIONS:  - Monitor Blood Glucose as ordered  - Assess for signs and symptoms of hyperglycemia and hypoglycemia  - Administer ordered medications to maintain glucose within target range  - Assess nutritional intake and initiate nutrition service referral as needed  Outcome: Progressing     Problem: SKIN/TISSUE INTEGRITY - ADULT  Goal: Skin Integrity remains intact(Skin Breakdown Prevention)  Description: Assess:  -Perform Jose assessment   -Clean and moisturize skin  -Inspect skin when repositioning, toileting, and assisting with ADLS  -Assess under medical devices   -Assess extremities for adequate circulation and sensation     Bed Management:  -Have minimal linens on bed & keep smooth, unwrinkled  -Change linens as needed when moist or perspiring  -Avoid sitting or lying in one position for more than 2 hours while in bed  -Keep HOB at 45 degrees     Toileting:  -Offer bedside commode  -Assess for incontinence  -Use incontinent care products after each incontinent episode    Activity:  -Mobilize patient 3 times a day  -Encourage activity and walks on unit  -Encourage or provide ROM exercises   -Turn and reposition patient every 2 Hours  -Use appropriate equipment to lift or move patient in bed  -Instruct/ Assist with weight shifting every 2 when out of bed in chair  -Consider limitation of chair time 2 hour intervals    Skin Care:  -Avoid use of baby powder, tape, friction and shearing, hot water or constrictive clothing  -Relieve pressure over bony prominences  -Do not massage red bony areas    Next Steps:  -Teach patient strategies to minimize risks   -Consider consults to  interdisciplinary teams  Outcome: Progressing

## 2025-02-09 NOTE — ASSESSMENT & PLAN NOTE
Lab Results   Component Value Date    EGFR 32 02/09/2025    EGFR 38 02/08/2025    EGFR 50 02/07/2025    CREATININE 2.21 (H) 02/09/2025    CREATININE 1.91 (H) 02/08/2025    CREATININE 1.54 (H) 02/07/2025     Baseline of 1.1 ~ 1.54.  Elevated, continue monitoring for now. Urinary retention protocol

## 2025-02-09 NOTE — ASSESSMENT & PLAN NOTE
Lab Results   Component Value Date    HGBA1C 9.8 (H) 02/05/2025     Recent Labs     02/08/25  2045 02/09/25  0407 02/09/25  0802 02/09/25  1130   POCGLU 175* 198* 265* 275*     Blood Sugar Average: Last 72 hrs:  (P) [id number]    54 year old male presented to the ED due to hyperglycemia and confusion. Possibly due to uncontrolled hyperglycemia secondary to medication non-compliance.   Will decrease to 8U TID with meals given hypoglycemia.

## 2025-02-09 NOTE — ASSESSMENT & PLAN NOTE
Lab Results   Component Value Date    HGBA1C 9.8 (H) 02/05/2025       Recent Labs     02/08/25  2045 02/09/25  0407 02/09/25  0802 02/09/25  1130   POCGLU 175* 198* 265* 275*       Blood Sugar Average: Last 72 hrs:  (P) [id number]  Improving, management per primary

## 2025-02-09 NOTE — ASSESSMENT & PLAN NOTE
Possibly due to alcohol abuse, hyperglycemia  Resolved    Recent Labs     02/07/25  0620   AST 23   ALT 52   TBILI 0.56   ALKPHOS 207*

## 2025-02-10 ENCOUNTER — ANESTHESIA EVENT (INPATIENT)
Dept: GASTROENTEROLOGY | Facility: HOSPITAL | Age: 55
DRG: 420 | End: 2025-02-10
Payer: COMMERCIAL

## 2025-02-10 ENCOUNTER — APPOINTMENT (INPATIENT)
Dept: GASTROENTEROLOGY | Facility: HOSPITAL | Age: 55
DRG: 420 | End: 2025-02-10
Payer: COMMERCIAL

## 2025-02-10 ENCOUNTER — ANESTHESIA (INPATIENT)
Dept: GASTROENTEROLOGY | Facility: HOSPITAL | Age: 55
DRG: 420 | End: 2025-02-10
Payer: COMMERCIAL

## 2025-02-10 LAB
ANION GAP SERPL CALCULATED.3IONS-SCNC: 6 MMOL/L (ref 4–13)
BUN SERPL-MCNC: 30 MG/DL (ref 5–25)
CALCIUM SERPL-MCNC: 8.8 MG/DL (ref 8.4–10.2)
CHLORIDE SERPL-SCNC: 102 MMOL/L (ref 96–108)
CO2 SERPL-SCNC: 26 MMOL/L (ref 21–32)
CREAT SERPL-MCNC: 1.95 MG/DL (ref 0.6–1.3)
ERYTHROCYTE [DISTWIDTH] IN BLOOD BY AUTOMATED COUNT: 11.2 % (ref 11.6–15.1)
GFR SERPL CREATININE-BSD FRML MDRD: 37 ML/MIN/1.73SQ M
GLUCOSE SERPL-MCNC: 119 MG/DL (ref 65–140)
GLUCOSE SERPL-MCNC: 137 MG/DL (ref 65–140)
GLUCOSE SERPL-MCNC: 147 MG/DL (ref 65–140)
GLUCOSE SERPL-MCNC: 171 MG/DL (ref 65–140)
GLUCOSE SERPL-MCNC: 180 MG/DL (ref 65–140)
GLUCOSE SERPL-MCNC: 262 MG/DL (ref 65–140)
HCT VFR BLD AUTO: 24.9 % (ref 36.5–49.3)
HGB BLD-MCNC: 8.5 G/DL (ref 12–17)
MAGNESIUM SERPL-MCNC: 1.8 MG/DL (ref 1.9–2.7)
MCH RBC QN AUTO: 31.5 PG (ref 26.8–34.3)
MCHC RBC AUTO-ENTMCNC: 34.1 G/DL (ref 31.4–37.4)
MCV RBC AUTO: 92 FL (ref 82–98)
PLATELET # BLD AUTO: 255 THOUSANDS/UL (ref 149–390)
PMV BLD AUTO: 11.5 FL (ref 8.9–12.7)
POTASSIUM SERPL-SCNC: 4.6 MMOL/L (ref 3.5–5.3)
RBC # BLD AUTO: 2.7 MILLION/UL (ref 3.88–5.62)
SODIUM SERPL-SCNC: 134 MMOL/L (ref 135–147)
WBC # BLD AUTO: 11.83 THOUSAND/UL (ref 4.31–10.16)

## 2025-02-10 PROCEDURE — 82948 REAGENT STRIP/BLOOD GLUCOSE: CPT

## 2025-02-10 PROCEDURE — 80048 BASIC METABOLIC PNL TOTAL CA: CPT | Performed by: STUDENT IN AN ORGANIZED HEALTH CARE EDUCATION/TRAINING PROGRAM

## 2025-02-10 PROCEDURE — 43239 EGD BIOPSY SINGLE/MULTIPLE: CPT | Performed by: INTERNAL MEDICINE

## 2025-02-10 PROCEDURE — 83735 ASSAY OF MAGNESIUM: CPT | Performed by: STUDENT IN AN ORGANIZED HEALTH CARE EDUCATION/TRAINING PROGRAM

## 2025-02-10 PROCEDURE — 0DJD8ZZ INSPECTION OF LOWER INTESTINAL TRACT, VIA NATURAL OR ARTIFICIAL OPENING ENDOSCOPIC: ICD-10-PCS | Performed by: INTERNAL MEDICINE

## 2025-02-10 PROCEDURE — 99232 SBSQ HOSP IP/OBS MODERATE 35: CPT | Performed by: INTERNAL MEDICINE

## 2025-02-10 PROCEDURE — 0DB98ZX EXCISION OF DUODENUM, VIA NATURAL OR ARTIFICIAL OPENING ENDOSCOPIC, DIAGNOSTIC: ICD-10-PCS | Performed by: INTERNAL MEDICINE

## 2025-02-10 PROCEDURE — 45385 COLONOSCOPY W/LESION REMOVAL: CPT | Performed by: INTERNAL MEDICINE

## 2025-02-10 PROCEDURE — 88305 TISSUE EXAM BY PATHOLOGIST: CPT | Performed by: PATHOLOGY

## 2025-02-10 PROCEDURE — 43255 EGD CONTROL BLEEDING ANY: CPT | Performed by: INTERNAL MEDICINE

## 2025-02-10 PROCEDURE — 0DB68ZX EXCISION OF STOMACH, VIA NATURAL OR ARTIFICIAL OPENING ENDOSCOPIC, DIAGNOSTIC: ICD-10-PCS | Performed by: INTERNAL MEDICINE

## 2025-02-10 PROCEDURE — 0DBK8ZZ EXCISION OF ASCENDING COLON, VIA NATURAL OR ARTIFICIAL OPENING ENDOSCOPIC: ICD-10-PCS | Performed by: INTERNAL MEDICINE

## 2025-02-10 PROCEDURE — NC001 PR NO CHARGE: Performed by: INTERNAL MEDICINE

## 2025-02-10 PROCEDURE — 85027 COMPLETE CBC AUTOMATED: CPT | Performed by: STUDENT IN AN ORGANIZED HEALTH CARE EDUCATION/TRAINING PROGRAM

## 2025-02-10 RX ORDER — SODIUM CHLORIDE, SODIUM LACTATE, POTASSIUM CHLORIDE, CALCIUM CHLORIDE 600; 310; 30; 20 MG/100ML; MG/100ML; MG/100ML; MG/100ML
INJECTION, SOLUTION INTRAVENOUS CONTINUOUS PRN
Status: DISCONTINUED | OUTPATIENT
Start: 2025-02-10 | End: 2025-02-10

## 2025-02-10 RX ORDER — LIDOCAINE HYDROCHLORIDE 10 MG/ML
INJECTION, SOLUTION EPIDURAL; INFILTRATION; INTRACAUDAL; PERINEURAL AS NEEDED
Status: DISCONTINUED | OUTPATIENT
Start: 2025-02-10 | End: 2025-02-10

## 2025-02-10 RX ORDER — INSULIN ASPART 100 [IU]/ML
5 INJECTION, SUSPENSION SUBCUTANEOUS
Status: DISCONTINUED | OUTPATIENT
Start: 2025-02-11 | End: 2025-02-11 | Stop reason: HOSPADM

## 2025-02-10 RX ORDER — ONDANSETRON 2 MG/ML
4 INJECTION INTRAMUSCULAR; INTRAVENOUS ONCE AS NEEDED
Status: DISCONTINUED | OUTPATIENT
Start: 2025-02-10 | End: 2025-02-11 | Stop reason: HOSPADM

## 2025-02-10 RX ORDER — DIPHENHYDRAMINE HYDROCHLORIDE 50 MG/ML
12.5 INJECTION INTRAMUSCULAR; INTRAVENOUS ONCE AS NEEDED
Status: DISCONTINUED | OUTPATIENT
Start: 2025-02-10 | End: 2025-02-11 | Stop reason: HOSPADM

## 2025-02-10 RX ORDER — PROPOFOL 10 MG/ML
INJECTION, EMULSION INTRAVENOUS AS NEEDED
Status: DISCONTINUED | OUTPATIENT
Start: 2025-02-10 | End: 2025-02-10

## 2025-02-10 RX ADMIN — INSULIN LISPRO 1 UNITS: 100 INJECTION, SOLUTION INTRAVENOUS; SUBCUTANEOUS at 12:49

## 2025-02-10 RX ADMIN — INSULIN LISPRO 2 UNITS: 100 INJECTION, SOLUTION INTRAVENOUS; SUBCUTANEOUS at 21:44

## 2025-02-10 RX ADMIN — PROPOFOL 100 MG: 10 INJECTION, EMULSION INTRAVENOUS at 15:21

## 2025-02-10 RX ADMIN — HEPARIN SODIUM 5000 UNITS: 5000 INJECTION INTRAVENOUS; SUBCUTANEOUS at 05:31

## 2025-02-10 RX ADMIN — AMLODIPINE BESYLATE 5 MG: 5 TABLET ORAL at 08:41

## 2025-02-10 RX ADMIN — Medication 1 TABLET: at 08:41

## 2025-02-10 RX ADMIN — Medication 100 MG: at 08:41

## 2025-02-10 RX ADMIN — SODIUM CHLORIDE, SODIUM LACTATE, POTASSIUM CHLORIDE, AND CALCIUM CHLORIDE: .6; .31; .03; .02 INJECTION, SOLUTION INTRAVENOUS at 15:11

## 2025-02-10 RX ADMIN — AMLODIPINE BESYLATE 5 MG: 5 TABLET ORAL at 18:28

## 2025-02-10 RX ADMIN — PROPOFOL 40 MG: 10 INJECTION, EMULSION INTRAVENOUS at 16:08

## 2025-02-10 RX ADMIN — HYDRALAZINE HYDROCHLORIDE 25 MG: 25 TABLET ORAL at 05:31

## 2025-02-10 RX ADMIN — PHENYLEPHRINE HYDROCHLORIDE 100 MCG: 50 INJECTION INTRAVENOUS at 15:54

## 2025-02-10 RX ADMIN — PANCRELIPASE 24000 UNITS: 120000; 24000; 76000 CAPSULE, DELAYED RELEASE PELLETS ORAL at 18:28

## 2025-02-10 RX ADMIN — HYDRALAZINE HYDROCHLORIDE 25 MG: 25 TABLET ORAL at 21:44

## 2025-02-10 RX ADMIN — PROPOFOL 120 MCG/KG/MIN: 10 INJECTION, EMULSION INTRAVENOUS at 15:23

## 2025-02-10 RX ADMIN — FOLIC ACID 1 MG: 1 TABLET ORAL at 08:41

## 2025-02-10 RX ADMIN — LIDOCAINE HYDROCHLORIDE 50 MG: 10 INJECTION, SOLUTION EPIDURAL; INFILTRATION; INTRACAUDAL at 15:21

## 2025-02-10 RX ADMIN — Medication 40 MG: at 15:55

## 2025-02-10 RX ADMIN — HEPARIN SODIUM 5000 UNITS: 5000 INJECTION INTRAVENOUS; SUBCUTANEOUS at 21:44

## 2025-02-10 RX ADMIN — INSULIN LISPRO 1 UNITS: 100 INJECTION, SOLUTION INTRAVENOUS; SUBCUTANEOUS at 08:41

## 2025-02-10 RX ADMIN — PANTOPRAZOLE SODIUM 40 MG: 40 TABLET, DELAYED RELEASE ORAL at 05:31

## 2025-02-10 NOTE — PROGRESS NOTES
At 2105 patient with a glucose of 37, diaphoretic. Awake and oriented, consumed 120 mL of apple juice. Following hypoglycemic protocol RN administered IV Dextrose. SLIM provider ARIANE De La Garza made aware at 2107 via secure chat, verbal to give full amp of IV Dextrose. Received 50 mL of IV dextrose and glucose rechecked and improved at 157.

## 2025-02-10 NOTE — ASSESSMENT & PLAN NOTE
Lab Results   Component Value Date    EGFR 37 02/10/2025    EGFR 32 02/09/2025    EGFR 38 02/08/2025    CREATININE 1.95 (H) 02/10/2025    CREATININE 2.21 (H) 02/09/2025    CREATININE 1.91 (H) 02/08/2025     Baseline of 1.1 ~ 1.54.  Elevated, continue monitoring for now. Urinary retention protocol

## 2025-02-10 NOTE — ANESTHESIA PREPROCEDURE EVALUATION
Procedure:  EGD  COLONOSCOPY    Relevant Problems   CARDIO   (+) Hypertensive urgency   (+) Paroxysmal A-fib (HCC)   (+) Primary hypertension      ENDO   (+) Type 2 diabetes mellitus (HCC)   (+) Type 2 diabetes mellitus with hypoglycemia, with long-term current use of insulin (HCC)      GI/HEPATIC   (+) Alcohol-induced chronic pancreatitis (HCC)   (+) Chronic pancreatitis (HCC)      /RENAL   (+) ZENAIDA (acute kidney injury) (HCC)   (+) Acute renal failure superimposed on stage 3 chronic kidney disease (HCC)   (+) CKD stage 3b, GFR 30-44 ml/min (HCC)      HEMATOLOGY   (+) Anemia   (+) Thrombocytopenia (HCC)      MUSCULOSKELETAL   (+) Acute low back pain   (+) Chronic low back pain      NEURO/PSYCH   (+) Chronic low back pain   (+) Seizure (HCC)        Physical Exam    Airway    Mallampati score: I  TM Distance: >3 FB  Neck ROM: full     Dental   No notable dental hx     Cardiovascular  Cardiovascular exam normal    Pulmonary  Pulmonary exam normal     Other Findings        Anesthesia Plan  ASA Score- 3     Anesthesia Type- IV sedation with anesthesia with ASA Monitors.         Additional Monitors:     Airway Plan:            Plan Factors-Exercise tolerance (METS): >4 METS.    Chart reviewed.   Existing labs reviewed. Patient summary reviewed.    Patient is a current smoker.  Patient instructed to abstain from smoking on day of procedure. Patient did not smoke on day of surgery.            Induction- intravenous.    Postoperative Plan-     Perioperative Resuscitation Plan - Level 1 - Full Code.       Informed Consent- Anesthetic plan and risks discussed with patient.  I personally reviewed this patient with the CRNA. Discussed and agreed on the Anesthesia Plan with the CRNA..      NPO Status:  Vitals Value Taken Time   Date of last liquid 02/10/25 02/10/25 1422   Time of last liquid 1100 02/10/25 1422   Date of last solid 02/08/25 02/10/25 1422   Time of last solid 1800 02/10/25 1422

## 2025-02-10 NOTE — UTILIZATION REVIEW
Continued Stay Review    Date: 2/10                          Current Patient Class: Inpatient  Current Level of Care: MS     HPI:54 y.o. male initially admitted on 2/5   Current Diagnosis:  Hyperosmolar Hyperglycemic state, acute metabolic encephalopathy, unintentional wt loss, AUD, HTN urgency, resolved transaminitis, chronic pancreatitis.     Assessment/Plan:   Pt is having EGD and colonoscopy today.  Nursing notes pt did not do well drinking prep.  Had episode of hypoglycemia on 2/9, adjustments to insulin regimen. Off CIWA.      Medications:   Scheduled Medications:  amLODIPine, 5 mg, Oral, BID  folic acid, 1 mg, Oral, Daily  heparin (porcine), 5,000 Units, Subcutaneous, Q8H JAISON  hydrALAZINE, 25 mg, Oral, Q8H JAISON  insulin aspart protamine-insulin aspart, 8 Units, Subcutaneous, BID AC  insulin lispro, 1-5 Units, Subcutaneous, TID AC  insulin lispro, 1-5 Units, Subcutaneous, HS  multivitamin-minerals, 1 tablet, Oral, Daily  pancrelipase (Lip-Prot-Amyl), 24,000 Units, Oral, TID With Meals  pantoprazole, 40 mg, Oral, Daily Before Breakfast  thiamine, 100 mg, Oral, Daily      Continuous IV Infusions:     PRN Meds:  acetaminophen, 650 mg, Oral, Q6H PRN  hydrALAZINE, 10 mg, Intravenous, Q6H PRN  labetalol, 10 mg, Intravenous, Q6H PRN  simethicone (MYLICON) 40 mg in sterile water 120 mL, , Irrigation, PRN - x 1 2/10      Discharge Plan: TBD    Vital Signs (last 3 days)       Date/Time Temp Pulse Resp BP MAP (mmHg) SpO2 O2 Device Patient Position - Orthostatic VS Liliana Coma Scale Score CIWA-Ar Total Pain    02/10/25 1434 97.8 °F (36.6 °C) 102 18 151/76 -- 100 % None (Room air) -- -- -- No Pain    02/10/25 07:33:16 97.3 °F (36.3 °C) 106 18 132/72 92 100 % None (Room air) Lying -- -- --    02/10/25 0720 -- -- -- -- -- -- None (Room air) -- 15 -- No Pain    02/10/25 05:30:59 -- 104 -- 143/78 100 99 % -- -- -- -- --    02/09/25 2300 97.8 °F (36.6 °C) 88 17 -- -- -- -- -- -- -- --    02/09/25 22:22:02 -- 106 -- 143/75 98 98  % None (Room air) -- -- -- --    02/09/25 2222 -- -- -- 143/45 -- -- -- -- -- -- --    02/09/25 2108 -- -- -- -- -- -- -- -- 15 -- --    02/09/25 1936 -- -- -- -- -- -- -- -- -- -- No Pain    02/09/25 15:50:31 98.7 °F (37.1 °C) 93 18 114/70 85 97 % None (Room air) Lying -- -- --    02/09/25 12:50:08 98.4 °F (36.9 °C) 99 -- 144/72 96 99 % -- -- -- -- --    02/09/25 08:03:15 98.6 °F (37 °C) 94 19 131/74 93 98 % None (Room air) Lying -- -- --    02/09/25 0750 -- -- -- -- -- 96 % None (Room air) -- 15 -- No Pain    02/09/25 07:07:12 98.1 °F (36.7 °C) 92 17 141/71 94 99 % -- -- -- -- --    02/09/25 0705 -- -- -- 141/71 -- -- -- -- -- -- --    02/09/25 04:44:35 -- 104 -- 115/73 87 98 % -- -- -- -- --    02/08/25 23:11:11 -- 91 -- 141/77 98 96 % -- -- -- -- --    02/08/25 20:46:15 -- 90 16 124/74 91 98 % -- -- -- -- --    02/08/25 2000 -- -- -- -- -- -- None (Room air) -- 15 -- No Pain    02/08/25 15:44:06 98.9 °F (37.2 °C) 98 18 119/64 82 98 % None (Room air) Lying -- 3 --    02/08/25 12:25:51 -- 98 18 128/65 86 98 % None (Room air) Lying -- 2 --    02/08/25 0845 -- -- -- -- -- -- None (Room air) -- 15 -- No Pain    02/08/25 0730 97.9 °F (36.6 °C) 94 17 160/87 111 99 % None (Room air) Lying -- -- --    02/08/25 0609 -- -- -- 147/83 -- -- -- -- -- 0 --    02/08/25 0000 -- -- -- -- -- -- -- -- -- 0 --    02/07/25 21:09:53 97.8 °F (36.6 °C) 104 20 151/89 110 99 % None (Room air) Lying -- 0 --    02/07/25 2100 -- -- -- -- -- -- None (Room air) -- 15 -- No Pain    02/07/25 1741 -- 97 -- 141/79 100 99 % -- -- -- -- --    02/07/25 1737 -- -- -- 141/79 -- -- -- -- -- -- --    02/07/25 15:37:54 98.4 °F (36.9 °C) 94 18 134/74 94 97 % None (Room air) Lying -- 1 --    02/07/25 13:28:09 -- 109 -- 157/90 112 98 % -- -- -- 1 --    02/07/25 0837 -- -- -- -- -- -- -- -- -- -- 7    02/07/25 07:34:15 98.3 °F (36.8 °C) 101 18 156/95 115 100 % None (Room air) Lying -- 1 --    02/07/25 0720 -- -- -- -- -- -- -- -- 15 -- No Pain    02/07/25  05:11:16 -- 95 -- 163/92 116 100 % -- -- -- -- --          Weight (last 2 days)       Date/Time Weight    02/10/25 1434 56.7 (125)    02/10/25 0556 57 (125.66)    02/09/25 0354 56.9 (125.44)    02/08/25 0547 56.8 (125.22)           CIWA-Ar Score       Row Name 02/08/25 15:44:06 02/08/25 12:25:51 02/08/25 0609       CIWA-Ar    Nausea and Vomiting 0 0 0    Tactile Disturbances 0 0 0    Tremor 2 1 0    Auditory Disturbances 0 0 0    Paroxysmal Sweats 0 0 0    Visual Disturbances 0 0 0    Anxiety 0 0 0    Headache, Fullness in Head 0 0 0    Agitation 1 1 0    Orientation and Clouding of Sensorium 0 0 0    CIWA-Ar Total 3 2 0      Row Name 02/08/25 0000 02/07/25 21:09:53          CIWA-Ar    Nausea and Vomiting 0 0     Tactile Disturbances 0 0     Tremor 0 0     Auditory Disturbances 0 0     Paroxysmal Sweats 0 0     Visual Disturbances 0 0     Anxiety 0 0     Headache, Fullness in Head 0 0     Agitation 0 0     Orientation and Clouding of Sensorium 0 0     CIWA-Ar Total 0 0                   Pertinent Labs/Diagnostic Results:   Radiology:  CT head wo contrast   Final Interpretation by Kushal Morejon MD (02/06 1403)      No acute intracranial abnormality.                  Workstation performed: [id number]         CT chest abdomen pelvis wo contrast   Final Interpretation by Kushal Morejon MD (02/06 1422)      No acute findings in the chest.      Nonspecific enteritis, likely infectious or inflammatory.      Chronic pancreatitis with chronic pancreatic ductal dilation and intraductal calculi better appreciated on comparison MRI.      The study was marked in EPIC for immediate notification.         Workstation performed: [id number]           Cardiology:  ECG 12 lead   Final Result by Vishnu Berkowitz DO (02/06 1249)   Normal sinus rhythm   Normal ECG   Confirmed by Vishnu Berkowitz ([id number]) on 2/6/2025 12:49:38 PM      ECG 12 lead   Final Result by Ana Paula Valdez MD (02/05 4403)   Age and gender  specific ECG analysis    Normal sinus rhythm   Cannot rule out Inferior infarct (cited on or before 05-Feb-2025)   Cannot rule out Anterior infarct (cited on or before 05-Feb-2025)   Abnormal ECG   When compared with ECG of 05-Feb-2025 20:02, (unconfirmed)   Poor data quality in previosu ECG prevents serial comparison   Confirmed by Ana Paula Valdez ([id number]) on 2/5/2025 11:07:52 PM        GI:  No orders to display           Results from last 7 days   Lab Units 02/10/25  0522 02/09/25  0458 02/08/25  0512 02/07/25  0620 02/06/25  0639 02/05/25 2009   WBC Thousand/uL 11.83* 12.16* 11.65* 10.74* 17.30* 8.39   HEMOGLOBIN g/dL 8.5* 9.2* 10.5* 9.9* 10.6* 11.1*   HEMATOCRIT % 24.9* 26.1* 29.1* 26.9* 28.0* 30.4*   PLATELETS Thousands/uL 255 232 201 192 242 226   TOTAL NEUT ABS Thousands/µL  --   --   --  7.47 13.46* 5.27         Results from last 7 days   Lab Units 02/10/25  0522 02/09/25  0458 02/08/25  0512 02/07/25  0620 02/06/25  0634 02/06/25  0634   SODIUM mmol/L 134* 133* 131* 129*  --  134*   POTASSIUM mmol/L 4.6 4.3 4.8 3.8  --  3.5   CHLORIDE mmol/L 102 101 97 98  --  102   CO2 mmol/L 26 22 26 25  --  25   ANION GAP mmol/L 6 10 8 6  --  7   BUN mg/dL 30* 25 19 12  --  10   CREATININE mg/dL 1.95* 2.21* 1.91* 1.54*  --  1.37*   EGFR ml/min/1.73sq m 37 32 38 50  --  58   CALCIUM mg/dL 8.8 8.6 8.8 8.1*  --  8.3*   MAGNESIUM mg/dL 1.8* 1.9 2.0 1.7* 1.9  --    PHOSPHORUS mg/dL  --   --   --  2.6*  --   --      Results from last 7 days   Lab Units 02/07/25  0620 02/06/25  0634 02/05/25 2009   AST U/L 23 39 60*   ALT U/L 52 69* 94*   ALK PHOS U/L 207* 233* 325*   TOTAL PROTEIN g/dL 5.2* 5.0* 6.1*   ALBUMIN g/dL 2.7* 2.6* 3.1*   TOTAL BILIRUBIN mg/dL 0.56 0.40 0.51   BILIRUBIN DIRECT mg/dL  --   --  0.14     Results from last 7 days   Lab Units 02/10/25  1118 02/10/25  0731 02/10/25  0534 02/09/25  2131 02/09/25  2102 02/09/25  1619 02/09/25  1130 02/09/25  0802 02/09/25  0407 02/08/25  2045 02/08/25  1619 02/08/25  1543    POC GLUCOSE mg/dl 171* 180* 137 157* 36* 96 275* 265* 198* 175* 78 60*     Results from last 7 days   Lab Units 02/10/25  0522 02/09/25  0458 02/08/25  0512 02/07/25  0620 02/06/25  0634 02/05/25 2009   GLUCOSE RANDOM mg/dL 147* 230* 301* 323* 172* 918*         Results from last 7 days   Lab Units 02/05/25 2009   HEMOGLOBIN A1C % 9.8*   EAG mg/dl 235     Beta- Hydroxybutyrate   Date Value Ref Range Status   02/05/2025 <0.05 0.02 - 0.27 mmol/L Final   10/22/2024 0.08 0.02 - 0.27 mmol/L Final   04/10/2024 <0.05 0.02 - 0.27 mmol/L Final          Results from last 7 days   Lab Units 02/05/25 2009   PH [place]  7.296*   PCO2 [place] mm Hg 49.1   PO2 [place] mm Hg 37.2   HCO3 [place] mmol/L 23.4*   BASE EXC [place] mmol/L -3.3   O2 CONTENT [place] ml/dL 12.4   O2 HGB, VENOUS % 71.6             Results from last 7 days   Lab Units 02/06/25  0051 02/05/25  2211 02/05/25 2009   HS TNI 0HR ng/L  --   --  18   HS TNI 2HR ng/L  --  20  --    HSTNI D2 ng/L  --  2  --    HS TNI 4HR ng/L 23  --   --    HSTNI D4 ng/L 5  --   --      Results from last 7 days   Lab Units 02/07/25  0620   FERRITIN ng/mL 875*   IRON SATURATION % 43   IRON ug/dL 81   TIBC ug/dL 190.4*     Results from last 7 days   Lab Units 02/07/25  0620   TRANSFERRIN mg/dL 136*             Results from last 7 days   Lab Units 02/06/25  0051   LIPASE u/L 6*                 Results from last 7 days   Lab Units 02/05/25  2144   CLARITY UA  Clear   COLOR UA  Yellow   SPEC GRAV UA  1.015   PH UA  6.0   GLUCOSE UA mg/dl >=1000 (1%)*   KETONES UA mg/dl Negative   BLOOD UA  Trace*   PROTEIN UA mg/dl 100 (2+)*   NITRITE UA  Negative   BILIRUBIN UA  Negative   UROBILINOGEN UA E.U./dl 0.2   LEUKOCYTES UA  Elevated glucose may cause decreased leukocyte values. See urine microscopic for UWBC result*   WBC UA /hpf 1-2   RBC UA /hpf 1-2   BACTERIA UA /hpf None Seen   EPITHELIAL CELLS WET PREP /hpf None Seen             Results from last 7 days   Lab Units 02/05/25  3759   AMPH/METH  Negative    BARBITURATE UR  Negative   BENZODIAZEPINE UR  Negative   COCAINE UR  Negative   METHADONE URINE  Negative   OPIATE UR  Negative   PCP UR  Negative   THC UR  Negative     Results from last 7 days   Lab Units 02/05/25  2318   ETHANOL LVL mg/dL <10   ACETAMINOPHEN LVL ug/mL <2*   SALICYLATE LVL mg/dL <5     Network Utilization Review Department  ATTENTION: Please call with any questions or concerns to [phone] and carefully listen to the prompts so that you are directed to the right person. All voicemails are confidential.   For Discharge needs, contact Care Management DC Support Team at [phone] opt. 2  Send all requests for admission clinical reviews, approved or denied determinations and any other requests to dedicated fax number below belonging to the campus where the patient is receiving treatment. List of dedicated fax numbers for the Facilities:  FACILITY NAME UR FAX NUMBER   ADMISSION DENIALS (Administrative/Medical Necessity) [phone]   DISCHARGE SUPPORT TEAM (NETWORK) [phone]   PARENT CHILD HEALTH (Maternity/NICU/Pediatrics) [phone]   [facility] [phone]   [facility] [phone]   [facility] [phone]   [facility] [phone]   Replaced by [facility] [phone]   [facility] [phone]   [facility] [phone]   [facility] [phone]   [facility] [phone]   [facility] [phone]   [facility] [phone]   [facility] [phone]

## 2025-02-10 NOTE — ASSESSMENT & PLAN NOTE
"Pt reports drinking \"heavily\" daily. He reports he is unable to quantify his drinking. He states that his last drink was approximately 3 days prior to admission.  Pt did have previous admission 10/2024 with witnessed seizure thought to be related to alcohol withdrawal/hyperglycemia  [facility] protocol discontinued  "

## 2025-02-10 NOTE — ASSESSMENT & PLAN NOTE
"CT head, CT chest abdomen pelvis without acute findings.   There was evidence however of chronic pancreatitis with chronic pancreatic ductal dilation and intraductal calculi better appreciated on comparison MRI.    As per GI: \"He has chronic pancreatitis due to alcohol and presents with 20 to 40 pounds of unintentional weight loss. This could be due to chronic pancreatitis, peptic ulcer disease, or malignancy\".  GI to perform scopes to rule out obvious malignancy.    Appreciate GI recommendations.  Underwent EGD / colonoscopy / EUS today.  Diet advanced  As per GI, Dieulafoy's lesion noted and cauterized in the stomach; hemostasis achieved  Biopsies obtained from remainder of examined GI tract; no other acute findings  "

## 2025-02-10 NOTE — ANESTHESIA POSTPROCEDURE EVALUATION
Post-Op Assessment Note    CV Status:  Stable    Pain management: adequate       Mental Status:  Alert and awake   Hydration Status:  Euvolemic   PONV Controlled:  Controlled   Airway Patency:  Patent     Post Op Vitals Reviewed: Yes    No anethesia notable event occurred.    Staff: Anesthesiologist           Last Filed PACU Vitals:  Vitals Value Taken Time   Temp 97.1 °F (36.2 °C) 02/10/25 1628   Pulse 93 02/10/25 1643   /67 02/10/25 1643   Resp 18 02/10/25 1643   SpO2 100 % 02/10/25 1643       Modified Christophe:     Vitals Value Taken Time   Activity 2 02/10/25 1643   Respiration 2 02/10/25 1643   Circulation 2 02/10/25 1643   Consciousness 2 02/10/25 1643   Oxygen Saturation 2 02/10/25 1643     Modified Christophe Score: 10

## 2025-02-10 NOTE — ANESTHESIA POSTPROCEDURE EVALUATION
Post-Op Assessment Note    CV Status:  Stable  Pain Score: 0    Pain management: adequate       Mental Status:  Alert and awake   Hydration Status:  Euvolemic   PONV Controlled:  Controlled   Airway Patency:  Patent     Post Op Vitals Reviewed: Yes    No anethesia notable event occurred.    Staff: CRNA           Last Filed PACU Vitals:  Vitals Value Taken Time   Temp 97.1 °F (36.2 °C) 02/10/25 1628   Pulse 90 02/10/25 1628   /63 02/10/25 1628   Resp 18 02/10/25 1628   SpO2 100 % 02/10/25 1628       Modified Christophe:     Vitals Value Taken Time   Activity 2 02/10/25 1628   Respiration 2 02/10/25 1628   Circulation 2 02/10/25 1628   Consciousness 1 02/10/25 1628   Oxygen Saturation 2 02/10/25 1628     Modified Christophe Score: 9

## 2025-02-10 NOTE — OCCUPATIONAL THERAPY NOTE
Occupational Therapy Screen:    Patient Name: Wes Araujo  Today's Date: 2/10/2025    OT consult received. Chart reviewed. Pt w/ AM-PAC of 24. Spoke to RN- RN reports ambulating in room w/ no physical assist and functioning near baseline. No acute skilled OT needs at this time. Will D/C OT and be available for re-consult as needed.    Barby White, OTR/L

## 2025-02-10 NOTE — ASSESSMENT & PLAN NOTE
Lab Results   Component Value Date    HGBA1C 9.8 (H) 02/05/2025     Recent Labs     02/10/25  0534 02/10/25  0731 02/10/25  1118 02/10/25  1702   POCGLU 137 180* 171* 119     Blood Sugar Average: Last 72 hrs:  (P) 174.45    54 year old male presented to the ED due to hyperglycemia and confusion. Possibly due to uncontrolled hyperglycemia secondary to medication non-compliance.   70/30 decreased to 5 units twice daily before meals this day secondary to hypoglycemia

## 2025-02-10 NOTE — PROGRESS NOTES
Despite encouragement and continued re-education, patient has been refusing to drink the bowel prep as ordered. Patient becomes visible agitated when spoken to about it and the need for the prep.

## 2025-02-10 NOTE — PHYSICAL THERAPY NOTE
PHYSICAL THERAPY SCREEN          Patient Name: Wes Araujo  Today's Date: 2/10/2025       02/10/25 1054   PT Last Visit   PT Visit Date 02/10/25   Note Type   Note type Screen   Additional Comments Consult received. Nsg am-pac 23. Spoke to RN who confirms pt w no difficulty w functional mobility at this time. Will dc orders and recommend continued mobilization via nsg/restorative.       Yeimi Colmenares, PT

## 2025-02-10 NOTE — PLAN OF CARE
Problem: Potential for Falls  Goal: Patient will remain free of falls  Description: INTERVENTIONS:  - Educate patient/family on patient safety including physical limitations  - Instruct patient to call for assistance with activity   - Consult OT/PT to assist with strengthening/mobility   - Keep Call bell within reach  - Keep bed low and locked with side rails adjusted as appropriate  - Keep care items and personal belongings within reach  - Initiate and maintain comfort rounds  - Make Fall Risk Sign visible to staff  - Offer Toileting every 2 Hours, in advance of need  - Initiate/Maintain bed alarm  - Obtain necessary fall risk management equipment: alarms  - Apply yellow socks and bracelet for high fall risk patients  - Consider moving patient to room near nurses station  Outcome: Progressing     Problem: PAIN - ADULT  Goal: Verbalizes/displays adequate comfort level or baseline comfort level  Description: Interventions:  - Encourage patient to monitor pain and request assistance  - Assess pain using appropriate pain scale  - Administer analgesics based on type and severity of pain and evaluate response  - Implement non-pharmacological measures as appropriate and evaluate response  - Consider cultural and social influences on pain and pain management  - Notify physician/advanced practitioner if interventions unsuccessful or patient reports new pain  Outcome: Progressing     Problem: INFECTION - ADULT  Goal: Absence or prevention of progression during hospitalization  Description: INTERVENTIONS:  - Assess and monitor for signs and symptoms of infection  - Monitor lab/diagnostic results  - Monitor all insertion sites, i.e. indwelling lines, tubes, and drains  - Monitor endotracheal if appropriate and nasal secretions for changes in amount and color  - [place] appropriate cooling/warming therapies per order  - Administer medications as ordered  - Instruct and encourage patient and family to use good hand hygiene  technique  - Identify and instruct in appropriate isolation precautions for identified infection/condition  Outcome: Progressing     Problem: SAFETY ADULT  Goal: Patient will remain free of falls  Description: INTERVENTIONS:  - Educate patient/family on patient safety including physical limitations  - Instruct patient to call for assistance with activity   - Consult OT/PT to assist with strengthening/mobility   - Keep Call bell within reach  - Keep bed low and locked with side rails adjusted as appropriate  - Keep care items and personal belongings within reach  - Initiate and maintain comfort rounds  - Make Fall Risk Sign visible to staff  - Offer Toileting every 2 Hours, in advance of need  - Initiate/Maintain bed alarm  - Obtain necessary fall risk management equipment: alarms  - Apply yellow socks and bracelet for high fall risk patients  - Consider moving patient to room near nurses station  Outcome: Progressing  Goal: Maintain or return to baseline ADL function  Description: INTERVENTIONS:  -  Assess patient's ability to carry out ADLs; assess patient's baseline for ADL function and identify physical deficits which impact ability to perform ADLs (bathing, care of mouth/teeth, toileting, grooming, dressing, etc.)  - Assess/evaluate cause of self-care deficits   - Assess range of motion  - Assess patient's mobility; develop plan if impaired  - Assess patient's need for assistive devices and provide as appropriate  - Encourage maximum independence but intervene and supervise when necessary  - Involve family in performance of ADLs  - Assess for home care needs following discharge   - Consider OT consult to assist with ADL evaluation and planning for discharge  - Provide patient education as appropriate  Outcome: Progressing  Goal: Maintains/Returns to pre admission functional level  Description: INTERVENTIONS:  - Perform AM-PAC 6 Click Basic Mobility/ Daily Activity assessment daily.  - Set and communicate daily  mobility goal to care team and patient/family/caregiver.   - Collaborate with rehabilitation services on mobility goals if consulted  - Perform Range of Motion 4 times a day.  - Reposition patient every 2 hours.  - Dangle patient 3 times a day  - Stand patient 3 times a day  - Ambulate patient 3 times a day  - Out of bed to chair 3 times a day   - Out of bed for meals 3 times a day  - Out of bed for toileting  - Record patient progress and toleration of activity level   Outcome: Progressing     Problem: DISCHARGE PLANNING  Goal: Discharge to home or other facility with appropriate resources  Description: INTERVENTIONS:  - Identify barriers to discharge w/patient and caregiver  - Arrange for needed discharge resources and transportation as appropriate  - Identify discharge learning needs (meds, wound care, etc.)  - Arrange for interpretive services to assist at discharge as needed  - Refer to Case Management Department for coordinating discharge planning if the patient needs post-hospital services based on physician/advanced practitioner order or complex needs related to functional status, cognitive ability, or social support system  Outcome: Progressing     Problem: Knowledge Deficit  Goal: Patient/family/caregiver demonstrates understanding of disease process, treatment plan, medications, and discharge instructions  Description: Complete learning assessment and assess knowledge base.  Interventions:  - Provide teaching at level of understanding  - Provide teaching via preferred learning methods  Outcome: Progressing     Problem: Nutrition/Hydration-ADULT  Goal: Nutrient/Hydration intake appropriate for improving, restoring or maintaining nutritional needs  Description: Monitor and assess patient's nutrition/hydration status for malnutrition. Collaborate with interdisciplinary team and initiate plan and interventions as ordered.  Monitor patient's weight and dietary intake as ordered or per policy. Utilize nutrition  screening tool and intervene as necessary. Determine patient's food preferences and provide high-protein, high-caloric foods as appropriate.     INTERVENTIONS:  - Monitor oral intake, urinary output, labs, and treatment plans  - Assess nutrition and hydration status and recommend course of action  - Evaluate amount of meals eaten  - Assist patient with eating if necessary   - Allow adequate time for meals  - Recommend/ encourage appropriate diets, oral nutritional supplements, and vitamin/mineral supplements  - Order, calculate, and assess calorie counts as needed  - Recommend, monitor, and adjust tube feedings and TPN/PPN based on assessed needs  - Assess need for intravenous fluids  - Provide specific nutrition/hydration education as appropriate  - Include patient/family/caregiver in decisions related to nutrition  Outcome: Progressing     Problem: NEUROSENSORY - ADULT  Goal: Achieves maximal functionality and self care  Description: INTERVENTIONS  - Monitor swallowing and airway patency with patient fatigue and changes in neurological status  - Encourage and assist patient to increase activity and self care.   - Encourage visually impaired, hearing impaired and aphasic patients to use assistive/communication devices  Outcome: Progressing     Problem: GASTROINTESTINAL - ADULT  Goal: Maintains adequate nutritional intake  Description: INTERVENTIONS:  - Monitor percentage of each meal consumed  - Identify factors contributing to decreased intake, treat as appropriate  - Assist with meals as needed  - Monitor I&O, weight, and lab values if indicated  - Obtain nutrition services referral as needed  Outcome: Progressing     Problem: METABOLIC, FLUID AND ELECTROLYTES - ADULT  Goal: Electrolytes maintained within normal limits  Description: INTERVENTIONS:  - Monitor labs and assess patient for signs and symptoms of electrolyte imbalances  - Administer electrolyte replacement as ordered  - Monitor response to electrolyte  replacements, including repeat lab results as appropriate  - Instruct patient on fluid and nutrition as appropriate  Outcome: Progressing  Goal: Glucose maintained within target range  Description: INTERVENTIONS:  - Monitor Blood Glucose as ordered  - Assess for signs and symptoms of hyperglycemia and hypoglycemia  - Administer ordered medications to maintain glucose within target range  - Assess nutritional intake and initiate nutrition service referral as needed  Outcome: Progressing     Problem: SKIN/TISSUE INTEGRITY - ADULT  Goal: Skin Integrity remains intact(Skin Breakdown Prevention)  Description: Assess:  -Perform Jose assessment every shift  -Clean and moisturize skin every shift  -Inspect skin when repositioning, toileting, and assisting with ADLS  -Assess under medical devices such as Masimo every shift  -Assess extremities for adequate circulation and sensation     Bed Management:  -Have minimal linens on bed & keep smooth, unwrinkled  -Change linens as needed when moist or perspiring  -Avoid sitting or lying in one position for more than 1 hours while in bed  -Keep HOB at 30 degrees     Toileting:  -Offer bedside commode  -Assess for incontinence every shift  -Use incontinent care products after each incontinent episode such as Shield wipes    Activity:  -Mobilize patient 4 times a day  -Encourage activity and walks on unit  -Encourage or provide ROM exercises   -Turn and reposition patient every 2 Hours  -Use appropriate equipment to lift or move patient in bed  -Instruct/ Assist with weight shifting every 1 hour when out of bed in chair  -Consider limitation of chair time 1 hour intervals    Skin Care:  -Avoid use of baby powder, tape, friction and shearing, hot water or constrictive clothing  -Relieve pressure over bony prominences using pillows  -Do not massage red bony areas    Next Steps:  -Teach patient strategies to minimize risks such as skin break down    -Consider consults to   interdisciplinary teams such as wound care nurse   Outcome: Progressing

## 2025-02-10 NOTE — ASSESSMENT & PLAN NOTE
"Possibly due to alcohol abuse, hyperglycemia  Resolved    No results for input(s): \"AST\", \"ALT\", \"TBILI\", \"DBILI\", \"ALKPHOS\", \"INR\" in the last 72 hours.    "

## 2025-02-10 NOTE — PLAN OF CARE
Problem: Potential for Falls  Goal: Patient will remain free of falls  Description: INTERVENTIONS:  - Educate patient/family on patient safety including physical limitations  - Instruct patient to call for assistance with activity   - Consult OT/PT to assist with strengthening/mobility   - Keep Call bell within reach  - Keep bed low and locked with side rails adjusted as appropriate  - Keep care items and personal belongings within reach  - Initiate and maintain comfort rounds  - Make Fall Risk Sign visible to staff  - Offer Toileting every 2 Hours, in advance of need  - Initiate/Maintain bed alarm  - Obtain necessary fall risk management equipment: alarms  - Apply yellow socks and bracelet for high fall risk patients  - Consider moving patient to room near nurses station  Outcome: Progressing     Problem: PAIN - ADULT  Goal: Verbalizes/displays adequate comfort level or baseline comfort level  Description: Interventions:  - Encourage patient to monitor pain and request assistance  - Assess pain using appropriate pain scale  - Administer analgesics based on type and severity of pain and evaluate response  - Implement non-pharmacological measures as appropriate and evaluate response  - Consider cultural and social influences on pain and pain management  - Notify physician/advanced practitioner if interventions unsuccessful or patient reports new pain  Outcome: Progressing     Problem: INFECTION - ADULT  Goal: Absence or prevention of progression during hospitalization  Description: INTERVENTIONS:  - Assess and monitor for signs and symptoms of infection  - Monitor lab/diagnostic results  - Monitor all insertion sites, i.e. indwelling lines, tubes, and drains  - Monitor endotracheal if appropriate and nasal secretions for changes in amount and color  - [place] appropriate cooling/warming therapies per order  - Administer medications as ordered  - Instruct and encourage patient and family to use good hand hygiene  technique  - Identify and instruct in appropriate isolation precautions for identified infection/condition  Outcome: Progressing     Problem: SAFETY ADULT  Goal: Patient will remain free of falls  Description: INTERVENTIONS:  - Educate patient/family on patient safety including physical limitations  - Instruct patient to call for assistance with activity   - Consult OT/PT to assist with strengthening/mobility   - Keep Call bell within reach  - Keep bed low and locked with side rails adjusted as appropriate  - Keep care items and personal belongings within reach  - Initiate and maintain comfort rounds  - Make Fall Risk Sign visible to staff  - Offer Toileting every 2 Hours, in advance of need  - Initiate/Maintain bed alarm  - Obtain necessary fall risk management equipment: alarms  - Apply yellow socks and bracelet for high fall risk patients  - Consider moving patient to room near nurses station  Outcome: Progressing  Goal: Maintain or return to baseline ADL function  Description: INTERVENTIONS:  -  Assess patient's ability to carry out ADLs; assess patient's baseline for ADL function and identify physical deficits which impact ability to perform ADLs (bathing, care of mouth/teeth, toileting, grooming, dressing, etc.)  - Assess/evaluate cause of self-care deficits   - Assess range of motion  - Assess patient's mobility; develop plan if impaired  - Assess patient's need for assistive devices and provide as appropriate  - Encourage maximum independence but intervene and supervise when necessary  - Involve family in performance of ADLs  - Assess for home care needs following discharge   - Consider OT consult to assist with ADL evaluation and planning for discharge  - Provide patient education as appropriate  Outcome: Progressing  Goal: Maintains/Returns to pre admission functional level  Description: INTERVENTIONS:  - Perform AM-PAC 6 Click Basic Mobility/ Daily Activity assessment daily.  - Set and communicate daily  mobility goal to care team and patient/family/caregiver.   - Collaborate with rehabilitation services on mobility goals if consulted  - Perform Range of Motion 4 times a day.  - Reposition patient every 2 hours.  - Dangle patient 3 times a day  - Stand patient 3 times a day  - Ambulate patient 3 times a day  - Out of bed to chair 3 times a day   - Out of bed for meals 3 times a day  - Out of bed for toileting  - Record patient progress and toleration of activity level   Outcome: Progressing     Problem: DISCHARGE PLANNING  Goal: Discharge to home or other facility with appropriate resources  Description: INTERVENTIONS:  - Identify barriers to discharge w/patient and caregiver  - Arrange for needed discharge resources and transportation as appropriate  - Identify discharge learning needs (meds, wound care, etc.)  - Arrange for interpretive services to assist at discharge as needed  - Refer to Case Management Department for coordinating discharge planning if the patient needs post-hospital services based on physician/advanced practitioner order or complex needs related to functional status, cognitive ability, or social support system  Outcome: Progressing     Problem: Knowledge Deficit  Goal: Patient/family/caregiver demonstrates understanding of disease process, treatment plan, medications, and discharge instructions  Description: Complete learning assessment and assess knowledge base.  Interventions:  - Provide teaching at level of understanding  - Provide teaching via preferred learning methods  Outcome: Progressing     Problem: Nutrition/Hydration-ADULT  Goal: Nutrient/Hydration intake appropriate for improving, restoring or maintaining nutritional needs  Description: Monitor and assess patient's nutrition/hydration status for malnutrition. Collaborate with interdisciplinary team and initiate plan and interventions as ordered.  Monitor patient's weight and dietary intake as ordered or per policy. Utilize nutrition  screening tool and intervene as necessary. Determine patient's food preferences and provide high-protein, high-caloric foods as appropriate.     INTERVENTIONS:  - Monitor oral intake, urinary output, labs, and treatment plans  - Assess nutrition and hydration status and recommend course of action  - Evaluate amount of meals eaten  - Assist patient with eating if necessary   - Allow adequate time for meals  - Recommend/ encourage appropriate diets, oral nutritional supplements, and vitamin/mineral supplements  - Order, calculate, and assess calorie counts as needed  - Recommend, monitor, and adjust tube feedings and TPN/PPN based on assessed needs  - Assess need for intravenous fluids  - Provide specific nutrition/hydration education as appropriate  - Include patient/family/caregiver in decisions related to nutrition  Outcome: Progressing     Problem: NEUROSENSORY - ADULT  Goal: Achieves maximal functionality and self care  Description: INTERVENTIONS  - Monitor swallowing and airway patency with patient fatigue and changes in neurological status  - Encourage and assist patient to increase activity and self care.   - Encourage visually impaired, hearing impaired and aphasic patients to use assistive/communication devices  Outcome: Progressing     Problem: GASTROINTESTINAL - ADULT  Goal: Maintains adequate nutritional intake  Description: INTERVENTIONS:  - Monitor percentage of each meal consumed  - Identify factors contributing to decreased intake, treat as appropriate  - Assist with meals as needed  - Monitor I&O, weight, and lab values if indicated  - Obtain nutrition services referral as needed  Outcome: Progressing     Problem: METABOLIC, FLUID AND ELECTROLYTES - ADULT  Goal: Electrolytes maintained within normal limits  Description: INTERVENTIONS:  - Monitor labs and assess patient for signs and symptoms of electrolyte imbalances  - Administer electrolyte replacement as ordered  - Monitor response to electrolyte  replacements, including repeat lab results as appropriate  - Instruct patient on fluid and nutrition as appropriate  Outcome: Progressing  Goal: Glucose maintained within target range  Description: INTERVENTIONS:  - Monitor Blood Glucose as ordered  - Assess for signs and symptoms of hyperglycemia and hypoglycemia  - Administer ordered medications to maintain glucose within target range  - Assess nutritional intake and initiate nutrition service referral as needed  Outcome: Progressing     Problem: SKIN/TISSUE INTEGRITY - ADULT  Goal: Skin Integrity remains intact(Skin Breakdown Prevention)  Description: Assess:  -Perform Jose assessment every shift  -Clean and moisturize skin every shift  -Inspect skin when repositioning, toileting, and assisting with ADLS  -Assess under medical devices such as Masimo every shift  -Assess extremities for adequate circulation and sensation     Bed Management:  -Have minimal linens on bed & keep smooth, unwrinkled  -Change linens as needed when moist or perspiring  -Avoid sitting or lying in one position for more than 1 hours while in bed  -Keep HOB at 30 degrees     Toileting:  -Offer bedside commode  -Assess for incontinence every shift  -Use incontinent care products after each incontinent episode such as Shield wipes    Activity:  -Mobilize patient 4 times a day  -Encourage activity and walks on unit  -Encourage or provide ROM exercises   -Turn and reposition patient every 2 Hours  -Use appropriate equipment to lift or move patient in bed  -Instruct/ Assist with weight shifting every 1 hour when out of bed in chair  -Consider limitation of chair time 1 hour intervals    Skin Care:  -Avoid use of baby powder, tape, friction and shearing, hot water or constrictive clothing  -Relieve pressure over bony prominences using pillows  -Do not massage red bony areas    Next Steps:  -Teach patient strategies to minimize risks such as skin break down    -Consider consults to   interdisciplinary teams such as wound care nurse   Outcome: Progressing

## 2025-02-11 VITALS
OXYGEN SATURATION: 100 % | SYSTOLIC BLOOD PRESSURE: 110 MMHG | TEMPERATURE: 97.9 F | DIASTOLIC BLOOD PRESSURE: 55 MMHG | RESPIRATION RATE: 16 BRPM | HEIGHT: 69 IN | HEART RATE: 94 BPM | BODY MASS INDEX: 17.96 KG/M2 | WEIGHT: 121.25 LBS

## 2025-02-11 LAB
ALBUMIN SERPL BCG-MCNC: 2.7 G/DL (ref 3.5–5)
ALP SERPL-CCNC: 158 U/L (ref 34–104)
ALT SERPL W P-5'-P-CCNC: 26 U/L (ref 7–52)
ANION GAP SERPL CALCULATED.3IONS-SCNC: 6 MMOL/L (ref 4–13)
AST SERPL W P-5'-P-CCNC: 15 U/L (ref 13–39)
BASOPHILS # BLD AUTO: 0.06 THOUSANDS/ΜL (ref 0–0.1)
BASOPHILS NFR BLD AUTO: 1 % (ref 0–1)
BILIRUB SERPL-MCNC: 1.77 MG/DL (ref 0.2–1)
BUN SERPL-MCNC: 34 MG/DL (ref 5–25)
CALCIUM ALBUM COR SERPL-MCNC: 9.6 MG/DL (ref 8.3–10.1)
CALCIUM SERPL-MCNC: 8.6 MG/DL (ref 8.4–10.2)
CHLORIDE SERPL-SCNC: 106 MMOL/L (ref 96–108)
CO2 SERPL-SCNC: 21 MMOL/L (ref 21–32)
CREAT SERPL-MCNC: 2.37 MG/DL (ref 0.6–1.3)
EOSINOPHIL # BLD AUTO: 0.23 THOUSAND/ΜL (ref 0–0.61)
EOSINOPHIL NFR BLD AUTO: 2 % (ref 0–6)
ERYTHROCYTE [DISTWIDTH] IN BLOOD BY AUTOMATED COUNT: 11.4 % (ref 11.6–15.1)
GFR SERPL CREATININE-BSD FRML MDRD: 29 ML/MIN/1.73SQ M
GLUCOSE SERPL-MCNC: 134 MG/DL (ref 65–140)
GLUCOSE SERPL-MCNC: 237 MG/DL (ref 65–140)
GLUCOSE SERPL-MCNC: 333 MG/DL (ref 65–140)
HCT VFR BLD AUTO: 19.3 % (ref 36.5–49.3)
HGB BLD-MCNC: 6.5 G/DL (ref 12–17)
IMM GRANULOCYTES # BLD AUTO: 0.08 THOUSAND/UL (ref 0–0.2)
IMM GRANULOCYTES NFR BLD AUTO: 1 % (ref 0–2)
LYMPHOCYTES # BLD AUTO: 2.9 THOUSANDS/ΜL (ref 0.6–4.47)
LYMPHOCYTES NFR BLD AUTO: 26 % (ref 14–44)
MAGNESIUM SERPL-MCNC: 1.8 MG/DL (ref 1.9–2.7)
MAGNESIUM SERPL-MCNC: 1.8 MG/DL (ref 1.9–2.7)
MCH RBC QN AUTO: 33 PG (ref 26.8–34.3)
MCHC RBC AUTO-ENTMCNC: 33.7 G/DL (ref 31.4–37.4)
MCV RBC AUTO: 98 FL (ref 82–98)
MONOCYTES # BLD AUTO: 0.92 THOUSAND/ΜL (ref 0.17–1.22)
MONOCYTES NFR BLD AUTO: 8 % (ref 4–12)
NEUTROPHILS # BLD AUTO: 7.01 THOUSANDS/ΜL (ref 1.85–7.62)
NEUTS SEG NFR BLD AUTO: 62 % (ref 43–75)
NRBC BLD AUTO-RTO: 0 /100 WBCS
PHOSPHATE SERPL-MCNC: 3.1 MG/DL (ref 2.7–4.5)
PLATELET # BLD AUTO: 238 THOUSANDS/UL (ref 149–390)
PMV BLD AUTO: 11.5 FL (ref 8.9–12.7)
POTASSIUM SERPL-SCNC: 5.3 MMOL/L (ref 3.5–5.3)
PROT SERPL-MCNC: 5 G/DL (ref 6.4–8.4)
RBC # BLD AUTO: 1.97 MILLION/UL (ref 3.88–5.62)
SODIUM SERPL-SCNC: 133 MMOL/L (ref 135–147)
WBC # BLD AUTO: 11.2 THOUSAND/UL (ref 4.31–10.16)

## 2025-02-11 PROCEDURE — 84100 ASSAY OF PHOSPHORUS: CPT | Performed by: INTERNAL MEDICINE

## 2025-02-11 PROCEDURE — 85025 COMPLETE CBC W/AUTO DIFF WBC: CPT | Performed by: INTERNAL MEDICINE

## 2025-02-11 PROCEDURE — 82948 REAGENT STRIP/BLOOD GLUCOSE: CPT

## 2025-02-11 PROCEDURE — 80053 COMPREHEN METABOLIC PANEL: CPT | Performed by: INTERNAL MEDICINE

## 2025-02-11 PROCEDURE — 99239 HOSP IP/OBS DSCHRG MGMT >30: CPT | Performed by: INTERNAL MEDICINE

## 2025-02-11 PROCEDURE — 83735 ASSAY OF MAGNESIUM: CPT | Performed by: INTERNAL MEDICINE

## 2025-02-11 RX ORDER — SODIUM CHLORIDE, SODIUM GLUCONATE, SODIUM ACETATE, POTASSIUM CHLORIDE, MAGNESIUM CHLORIDE, SODIUM PHOSPHATE, DIBASIC, AND POTASSIUM PHOSPHATE .53; .5; .37; .037; .03; .012; .00082 G/100ML; G/100ML; G/100ML; G/100ML; G/100ML; G/100ML; G/100ML
75 INJECTION, SOLUTION INTRAVENOUS CONTINUOUS
Status: DISCONTINUED | OUTPATIENT
Start: 2025-02-11 | End: 2025-02-11 | Stop reason: HOSPADM

## 2025-02-11 RX ADMIN — PANCRELIPASE 24000 UNITS: 120000; 24000; 76000 CAPSULE, DELAYED RELEASE PELLETS ORAL at 08:11

## 2025-02-11 RX ADMIN — FOLIC ACID 1 MG: 1 TABLET ORAL at 08:11

## 2025-02-11 RX ADMIN — Medication 100 MG: at 08:11

## 2025-02-11 RX ADMIN — AMLODIPINE BESYLATE 5 MG: 5 TABLET ORAL at 08:11

## 2025-02-11 RX ADMIN — INSULIN ASPART 5 UNITS: 100 INJECTION, SUSPENSION SUBCUTANEOUS at 08:13

## 2025-02-11 RX ADMIN — INSULIN LISPRO 3 UNITS: 100 INJECTION, SOLUTION INTRAVENOUS; SUBCUTANEOUS at 08:11

## 2025-02-11 RX ADMIN — SODIUM CHLORIDE, SODIUM GLUCONATE, SODIUM ACETATE, POTASSIUM CHLORIDE, MAGNESIUM CHLORIDE, SODIUM PHOSPHATE, DIBASIC, AND POTASSIUM PHOSPHATE 75 ML/HR: .53; .5; .37; .037; .03; .012; .00082 INJECTION, SOLUTION INTRAVENOUS at 11:17

## 2025-02-11 RX ADMIN — PANTOPRAZOLE SODIUM 40 MG: 40 TABLET, DELAYED RELEASE ORAL at 06:00

## 2025-02-11 RX ADMIN — HYDRALAZINE HYDROCHLORIDE 25 MG: 25 TABLET ORAL at 06:00

## 2025-02-11 RX ADMIN — HEPARIN SODIUM 5000 UNITS: 5000 INJECTION INTRAVENOUS; SUBCUTANEOUS at 06:01

## 2025-02-11 RX ADMIN — Medication 1 TABLET: at 08:11

## 2025-02-11 NOTE — ASSESSMENT & PLAN NOTE
Lab Results   Component Value Date    HGBA1C 9.8 (H) 02/05/2025     Recent Labs     02/10/25  1702 02/10/25  2054 02/11/25  0715 02/11/25  1105   POCGLU 119 262* 333* 134     Blood Sugar Average: Last 72 hrs:  (P) 180    54 year old male presented to the ED due to hyperglycemia and confusion. Possibly due to uncontrolled hyperglycemia secondary to medication non-compliance.   70/30 decreased to 5 units twice daily before meals  secondary to hypoglycemia   Statement Selected

## 2025-02-11 NOTE — ASSESSMENT & PLAN NOTE
Likely secondary to chronic alcohol use, advised abstention  Monitor in the outpatient setting  Currently asymptomatic, obtain a.m. labs and discuss with GI moving forward

## 2025-02-11 NOTE — ASSESSMENT & PLAN NOTE
Possibly due to alcohol abuse, hyperglycemia  Resolved    Recent Labs     02/11/25  0539   AST 15   ALT 26   TBILI 1.77*   ALKPHOS 158*

## 2025-02-11 NOTE — PLAN OF CARE
Problem: Potential for Falls  Goal: Patient will remain free of falls  Description: INTERVENTIONS:  - Educate patient/family on patient safety including physical limitations  - Instruct patient to call for assistance with activity   - Consult OT/PT to assist with strengthening/mobility   - Keep Call bell within reach  - Keep bed low and locked with side rails adjusted as appropriate  - Keep care items and personal belongings within reach  - Initiate and maintain comfort rounds  - Make Fall Risk Sign visible to staff  - Offer Toileting every 2 Hours, in advance of need  - Initiate/Maintain bed alarm  - Obtain necessary fall risk management equipment: alarms  - Apply yellow socks and bracelet for high fall risk patients  - Consider moving patient to room near nurses station  Outcome: Progressing     Problem: PAIN - ADULT  Goal: Verbalizes/displays adequate comfort level or baseline comfort level  Description: Interventions:  - Encourage patient to monitor pain and request assistance  - Assess pain using appropriate pain scale  - Administer analgesics based on type and severity of pain and evaluate response  - Implement non-pharmacological measures as appropriate and evaluate response  - Consider cultural and social influences on pain and pain management  - Notify physician/advanced practitioner if interventions unsuccessful or patient reports new pain  Outcome: Progressing     Problem: INFECTION - ADULT  Goal: Absence or prevention of progression during hospitalization  Description: INTERVENTIONS:  - Assess and monitor for signs and symptoms of infection  - Monitor lab/diagnostic results  - Monitor all insertion sites, i.e. indwelling lines, tubes, and drains  - Monitor endotracheal if appropriate and nasal secretions for changes in amount and color  - Loraine appropriate cooling/warming therapies per order  - Administer medications as ordered  - Instruct and encourage patient and family to use good hand hygiene  technique  - Identify and instruct in appropriate isolation precautions for identified infection/condition  Outcome: Progressing     Problem: SAFETY ADULT  Goal: Patient will remain free of falls  Description: INTERVENTIONS:  - Educate patient/family on patient safety including physical limitations  - Instruct patient to call for assistance with activity   - Consult OT/PT to assist with strengthening/mobility   - Keep Call bell within reach  - Keep bed low and locked with side rails adjusted as appropriate  - Keep care items and personal belongings within reach  - Initiate and maintain comfort rounds  - Make Fall Risk Sign visible to staff  - Offer Toileting every 2 Hours, in advance of need  - Initiate/Maintain bed alarm  - Obtain necessary fall risk management equipment: alarms  - Apply yellow socks and bracelet for high fall risk patients  - Consider moving patient to room near nurses station  Outcome: Progressing  Goal: Maintain or return to baseline ADL function  Description: INTERVENTIONS:  -  Assess patient's ability to carry out ADLs; assess patient's baseline for ADL function and identify physical deficits which impact ability to perform ADLs (bathing, care of mouth/teeth, toileting, grooming, dressing, etc.)  - Assess/evaluate cause of self-care deficits   - Assess range of motion  - Assess patient's mobility; develop plan if impaired  - Assess patient's need for assistive devices and provide as appropriate  - Encourage maximum independence but intervene and supervise when necessary  - Involve family in performance of ADLs  - Assess for home care needs following discharge   - Consider OT consult to assist with ADL evaluation and planning for discharge  - Provide patient education as appropriate  Outcome: Progressing  Goal: Maintains/Returns to pre admission functional level  Description: INTERVENTIONS:  - Perform AM-PAC 6 Click Basic Mobility/ Daily Activity assessment daily.  - Set and communicate daily  mobility goal to care team and patient/family/caregiver.   - Collaborate with rehabilitation services on mobility goals if consulted  - Perform Range of Motion 4 times a day.  - Reposition patient every 2 hours.  - Dangle patient 3 times a day  - Stand patient 3 times a day  - Ambulate patient 3 times a day  - Out of bed to chair 3 times a day   - Out of bed for meals 3 times a day  - Out of bed for toileting  - Record patient progress and toleration of activity level   Outcome: Progressing     Problem: DISCHARGE PLANNING  Goal: Discharge to home or other facility with appropriate resources  Description: INTERVENTIONS:  - Identify barriers to discharge w/patient and caregiver  - Arrange for needed discharge resources and transportation as appropriate  - Identify discharge learning needs (meds, wound care, etc.)  - Arrange for interpretive services to assist at discharge as needed  - Refer to Case Management Department for coordinating discharge planning if the patient needs post-hospital services based on physician/advanced practitioner order or complex needs related to functional status, cognitive ability, or social support system  Outcome: Progressing     Problem: Knowledge Deficit  Goal: Patient/family/caregiver demonstrates understanding of disease process, treatment plan, medications, and discharge instructions  Description: Complete learning assessment and assess knowledge base.  Interventions:  - Provide teaching at level of understanding  - Provide teaching via preferred learning methods  Outcome: Progressing     Problem: Nutrition/Hydration-ADULT  Goal: Nutrient/Hydration intake appropriate for improving, restoring or maintaining nutritional needs  Description: Monitor and assess patient's nutrition/hydration status for malnutrition. Collaborate with interdisciplinary team and initiate plan and interventions as ordered.  Monitor patient's weight and dietary intake as ordered or per policy. Utilize nutrition  screening tool and intervene as necessary. Determine patient's food preferences and provide high-protein, high-caloric foods as appropriate.     INTERVENTIONS:  - Monitor oral intake, urinary output, labs, and treatment plans  - Assess nutrition and hydration status and recommend course of action  - Evaluate amount of meals eaten  - Assist patient with eating if necessary   - Allow adequate time for meals  - Recommend/ encourage appropriate diets, oral nutritional supplements, and vitamin/mineral supplements  - Order, calculate, and assess calorie counts as needed  - Recommend, monitor, and adjust tube feedings and TPN/PPN based on assessed needs  - Assess need for intravenous fluids  - Provide specific nutrition/hydration education as appropriate  - Include patient/family/caregiver in decisions related to nutrition  Outcome: Progressing     Problem: NEUROSENSORY - ADULT  Goal: Achieves maximal functionality and self care  Description: INTERVENTIONS  - Monitor swallowing and airway patency with patient fatigue and changes in neurological status  - Encourage and assist patient to increase activity and self care.   - Encourage visually impaired, hearing impaired and aphasic patients to use assistive/communication devices  Outcome: Progressing     Problem: GASTROINTESTINAL - ADULT  Goal: Maintains adequate nutritional intake  Description: INTERVENTIONS:  - Monitor percentage of each meal consumed  - Identify factors contributing to decreased intake, treat as appropriate  - Assist with meals as needed  - Monitor I&O, weight, and lab values if indicated  - Obtain nutrition services referral as needed  Outcome: Progressing     Problem: METABOLIC, FLUID AND ELECTROLYTES - ADULT  Goal: Electrolytes maintained within normal limits  Description: INTERVENTIONS:  - Monitor labs and assess patient for signs and symptoms of electrolyte imbalances  - Administer electrolyte replacement as ordered  - Monitor response to electrolyte  replacements, including repeat lab results as appropriate  - Instruct patient on fluid and nutrition as appropriate  Outcome: Progressing  Goal: Glucose maintained within target range  Description: INTERVENTIONS:  - Monitor Blood Glucose as ordered  - Assess for signs and symptoms of hyperglycemia and hypoglycemia  - Administer ordered medications to maintain glucose within target range  - Assess nutritional intake and initiate nutrition service referral as needed  Outcome: Progressing     Problem: SKIN/TISSUE INTEGRITY - ADULT  Goal: Skin Integrity remains intact(Skin Breakdown Prevention)  Description: Assess:  -Perform Jose assessment every shift  -Clean and moisturize skin every shift  -Inspect skin when repositioning, toileting, and assisting with ADLS  -Assess under medical devices such as Masimo every shift  -Assess extremities for adequate circulation and sensation     Bed Management:  -Have minimal linens on bed & keep smooth, unwrinkled  -Change linens as needed when moist or perspiring  -Avoid sitting or lying in one position for more than 1 hours while in bed  -Keep HOB at 30 degrees     Toileting:  -Offer bedside commode  -Assess for incontinence every shift  -Use incontinent care products after each incontinent episode such as Shield wipes    Activity:  -Mobilize patient 4 times a day  -Encourage activity and walks on unit  -Encourage or provide ROM exercises   -Turn and reposition patient every 2 Hours  -Use appropriate equipment to lift or move patient in bed  -Instruct/ Assist with weight shifting every 1 hour when out of bed in chair  -Consider limitation of chair time 1 hour intervals    Skin Care:  -Avoid use of baby powder, tape, friction and shearing, hot water or constrictive clothing  -Relieve pressure over bony prominences using pillows  -Do not massage red bony areas    Next Steps:  -Teach patient strategies to minimize risks such as skin break down    -Consider consults to   interdisciplinary teams such as wound care nurse   Outcome: Progressing

## 2025-02-11 NOTE — NURSING NOTE
Assumed care of patient at this time. Patient observed resting comfortably in bed, verbalized no issues at this time. Call bell within reach. Assessment as noted in flowsheets.

## 2025-02-11 NOTE — DISCHARGE SUMMARY
"Discharge Summary - Hospitalist   Name: Wes Araujo 55 y.o. male I MRN: [id number]  Unit/Bed#: [place] [address] -01 I Date of Admission: 2/5/2025   Date of Service: 2/11/2025 I Hospital Day: 6     Assessment & Plan  Hyperosmolar hyperglycemic state (HHS) (HCC)  Lab Results   Component Value Date    HGBA1C 9.8 (H) 02/05/2025     Recent Labs     02/10/25  1702 02/10/25  2054 02/11/25  0715 02/11/25  1105   POCGLU 119 262* 333* 134     Blood Sugar Average: Last 72 hrs:  (P) 180    54 year old male presented to the ED due to hyperglycemia and confusion. Possibly due to uncontrolled hyperglycemia secondary to medication non-compliance.   70/30 decreased to 5 units twice daily before meals  secondary to hypoglycemia  Acute metabolic encephalopathy  On initial presentation to the ED, pt noted to be confused. Improved possibly due to hypertensive urgency, hyperglycemia, alcohol abuse. Improved.    Unintentional weight loss  CT head, CT chest abdomen pelvis without acute findings.   There was evidence however of chronic pancreatitis with chronic pancreatic ductal dilation and intraductal calculi better appreciated on comparison MRI.    As per GI: \"He has chronic pancreatitis due to alcohol and presents with 20 to 40 pounds of unintentional weight loss. This could be due to chronic pancreatitis, peptic ulcer disease, or malignancy\".  GI to perform scopes to rule out obvious malignancy.    Appreciate GI recommendations.  Underwent EGD / colonoscopy / EUS today.  Diet advanced  As per GI, Dieulafoy's lesion noted and cauterized in the stomach; hemostasis achieved  Biopsies obtained from remainder of examined GI tract; no other acute findings  Hypertensive urgency  Pt blood pressure noted w/ SBP in the 200's upon arrival, now improved   Resolved   Transaminitis  Possibly due to alcohol abuse, hyperglycemia  Resolved    Recent Labs     02/11/25  0539   AST 15   ALT 26   TBILI 1.77*   ALKPHOS 158*       Alcohol abuse with history " "of withdrawal (HCC)  Pt reports drinking \"heavily\" daily. He reports he is unable to quantify his drinking. He states that his last drink was approximately 3 days prior to admission.  Pt did have previous admission 10/2024 with witnessed seizure thought to be related to alcohol withdrawal/hyperglycemia  CIWA protocol discontinued  CKD stage 3b, GFR 30-44 ml/min ([facility])  Lab Results   Component Value Date    EGFR 29 02/11/2025    EGFR 37 02/10/2025    EGFR 32 02/09/2025    CREATININE 2.37 (H) 02/11/2025    CREATININE 1.95 (H) 02/10/2025    CREATININE 2.21 (H) 02/09/2025     Baseline of 1.1 ~ 1.54.  Elevated, continue monitoring for now. Urinary retention protocol    2/11-update: Unfortunately, patient's creatinine is worsening; currently has acute on chronic renal failure with a creatinine of 2.37.  Most likely, this represents volume depletion after being n.p.o. for EGD/colonoscopy and undergoing bowel prep.  UA on admission reviewed, will repeat and obtain Fe urea.  Challenge with gentle IV fluids.  Low threshold to consult nephrology if does not improve with gentle IV hydration.  Chronic pancreatitis (HCC)  Likely secondary to chronic alcohol use, advised abstention  Monitor in the outpatient setting  Currently asymptomatic, obtain a.m. labs and discuss with GI moving forward     Medical Problems       Resolved Problems  Date Reviewed: 10/24/2024   None       Discharging Physician / Practitioner: Collin Da Silva MD  PCP: Rush Kebede MD  Admission Date:   Admission Orders (From admission, onward)       Ordered        02/05/25 2242  Inpatient Admission  Once                          Discharge Date: 02/11/25    Consultations During Hospital Stay:  Nephrology  Gastroenterology    Procedures Performed:   EGD  Colonoscopy  CT chest abdomen and pelvis  CT head    Significant Findings / Test Results:   HHS  ZENAIDA    Incidental Findings:   Dieulafoy's Lesion      Test Results Pending at Discharge (will " "require follow up):   None     Outpatient Tests Requested:  CBC/CMP  A1c    Complications: None    Reason for Admission: Weakness, altered mental status    Hospital Course:   As per HPI:    \"Wes Araujo is a 54 y.o. male who presents with HHS and acute metabolic encephalopathy. Per pt family, pt was having difficulty getting out of bed with generalized weakness and confusion prompting them to call EMS. Pt reports he does not recall this. He reports he remembers walking up in the ambulance. He states he now feels much better and is pretty much back to his baseline. He is currently AAOx4. Pt does report his blood glucose levels have been high at home. He also states he is a \"heavy\" daily drinker. He states he is unable to quantify exactly how much he drinks daily, but states his last drink was 3 days ago. He denies any other symptoms such as chest pain, SOB, fever, chills, palpitations, abdominal pain, headache, neuro or urinary symptoms.\"      Patient presented with altered mental status and weakness, noted to have HHS with blood glucose of 918 on presentation.  Started on insulin drip and transition to patient's home regimen.  Today blood glucose much improved.  Patient also noted to have hypertensive urgency on admission, home medications started and today blood pressure 110/55, continue present therapy.    Because of recent weight loss and findings of chronic pancreatitis, GI took patient for EGD and colonoscopy. Dieulafoy's Lesion was noted and clipped, no other acute findings at this time.  Should follow-up with gastroenterology in the outpatient setting for EUS.    Patient also noted to have elevated creatinine, likely secondary to prerenal causes given that he was n.p.o. much of yesterday with bowel prep.  Started fluid resuscitation, but at this time patient is requesting to leave AMA    Have discussed with the patient importance of staying in the hospital to receive treatment for acute kidney injury; he " responded that today is his [birth year] and he has other places he would like to be.  Signed form which will be placed in chart.  Patient advised to follow-up with primary care provider at earliest convenience.    Condition at Discharge:  AGAINST MEDICAL ADVICE    Discharge Day Visit / Exam:   * Please refer to separate progress note for these details *    Discussion with Family: Care plan discussed with patient who voiced understanding and agrees with recommendations.      Discharge instructions/Information to patient and family:   See after visit summary for information provided to patient and family.      Provisions for Follow-Up Care:  See after visit summary for information related to follow-up care and any pertinent home health orders.      Mobility at time of Discharge:   Basic Mobility Inpatient Raw Score: 22  JH-HLM Goal: 7: Walk 25 feet or more  JH-HLM Achieved: 7: Walk 25 feet or more  HLM Goal achieved. Continue to encourage appropriate mobility.     Disposition:   Home    Planned Readmission: None    Discharge Medications:  See after visit summary for reconciled discharge medications provided to patient and/or family.      Administrative Statements   Discharge Statement:  I have spent a total time of 45 minutes in caring for this patient on the day of the visit/encounter. .    **Please Note: This note may have been constructed using a voice recognition system**

## 2025-02-11 NOTE — PROGRESS NOTES
"Progress Note - Hospitalist   Name: Wes Araujo 54 y.o. male I MRN: [id number]  Unit/Bed#: [place] [address] -01 I Date of Admission: 2/5/2025   Date of Service: 2/10/2025 I Hospital Day: 5    Assessment & Plan  Hyperosmolar hyperglycemic state (HHS) (HCC)  Lab Results   Component Value Date    HGBA1C 9.8 (H) 02/05/2025     Recent Labs     02/10/25  0534 02/10/25  0731 02/10/25  1118 02/10/25  1702   POCGLU 137 180* 171* 119     Blood Sugar Average: Last 72 hrs:  (P) 174.45    54 year old male presented to the ED due to hyperglycemia and confusion. Possibly due to uncontrolled hyperglycemia secondary to medication non-compliance.   70/30 decreased to 5 units twice daily before meals this day secondary to hypoglycemia  Acute metabolic encephalopathy  On initial presentation to the ED, pt noted to be confused. Improved possibly due to hypertensive urgency, hyperglycemia, alcohol abuse. Improved.    Unintentional weight loss  CT head, CT chest abdomen pelvis without acute findings.   There was evidence however of chronic pancreatitis with chronic pancreatic ductal dilation and intraductal calculi better appreciated on comparison MRI.    As per GI: \"He has chronic pancreatitis due to alcohol and presents with 20 to 40 pounds of unintentional weight loss. This could be due to chronic pancreatitis, peptic ulcer disease, or malignancy\".  GI to perform scopes to rule out obvious malignancy.    Appreciate GI recommendations.  Underwent EGD / colonoscopy / EUS today.  Diet advanced  As per GI, Dieulafoy's lesion noted and cauterized in the stomach; hemostasis achieved  Biopsies obtained from remainder of examined GI tract; no other acute findings  Hypertensive urgency  Pt blood pressure noted w/ SBP in the 200's upon arrival, now improved   Resolved   Transaminitis  Possibly due to alcohol abuse, hyperglycemia  Resolved    No results for input(s): \"AST\", \"ALT\", \"TBILI\", \"DBILI\", \"ALKPHOS\", \"INR\" in the last 72 hours.    Alcohol " "abuse with history of withdrawal (HCC)  Pt reports drinking \"heavily\" daily. He reports he is unable to quantify his drinking. He states that his last drink was approximately 3 days prior to admission.  Pt did have previous admission 10/2024 with witnessed seizure thought to be related to alcohol withdrawal/hyperglycemia  [facility] protocol discontinued  CKD stage 3b, GFR 30-44 ml/min ([facility])  Lab Results   Component Value Date    EGFR 37 02/10/2025    EGFR 32 02/09/2025    EGFR 38 02/08/2025    CREATININE 1.95 (H) 02/10/2025    CREATININE 2.21 (H) 02/09/2025    CREATININE 1.91 (H) 02/08/2025     Baseline of 1.1 ~ 1.54.  Elevated, continue monitoring for now. Urinary retention protocol  Chronic pancreatitis (HCC)  Likely secondary to chronic alcohol use, advised abstention  Monitor in the outpatient setting  Currently asymptomatic, obtain a.m. labs and discuss with GI moving forward    VTE Pharmacologic Prophylaxis: VTE Score: 1 Moderate Risk (Score 3-4) - Pharmacological DVT Prophylaxis Ordered: heparin.    Mobility:   Basic Mobility Inpatient Raw Score: 22  JH-HLM Goal: 7: Walk 25 feet or more  JH-HLM Achieved: 7: Walk 25 feet or more  JH-HLM Goal achieved. Continue to encourage appropriate mobility.    Patient Centered Rounds: I performed bedside rounds with nursing staff today.   Discussions with Specialists or Other Care Team Provider: Gastroenterology    Education and Discussions with Family / Patient: Care plan discussed with patient who voiced understanding and agrees with recommendations.      Current Length of Stay: 5 day(s)  Current Patient Status: Inpatient   Certification Statement: The patient will continue to require additional inpatient hospital stay due to workup of weight loss; ZENAIDA  Discharge Plan: Anticipate discharge in 24-48 hrs to home.    Code Status: Level 1 - Full Code    Subjective   Patient seen and examined bedside, no acute distress or discomfort noted.  Patient very hard of hearing and was " alert and oriented X3 when I questioned him directly, but did have difficulty communicating with  software as he could not hear most of what the  asked.  Nonetheless, underwent EGD and colonoscopy with GI today, see separate procedure note for further details.  Monitor on morning labs for resolution of ZENAIDA.  Likely stable for discharge in 24 to 48 hours.    Objective :  Temp:  [97.1 °F (36.2 °C)-97.8 °F (36.6 °C)] 97.3 °F (36.3 °C)  HR:  [] 102  BP: (115-151)/(45-78) 115/72  Resp:  [17-20] 20  SpO2:  [98 %-100 %] 99 %  O2 Device: None (Room air)    Body mass index is 18.46 kg/m².     Input and Output Summary (last 24 hours):     Intake/Output Summary (Last 24 hours) at 2/10/2025 2034  Last data filed at 2/10/2025 1850  Gross per 24 hour   Intake 980 ml   Output 0 ml   Net 980 ml       Physical Exam  Vitals and nursing note reviewed.   Constitutional:       Appearance: He is well-developed.   HENT:      Head: Normocephalic and atraumatic.      Right Ear: External ear normal.      Left Ear: External ear normal.      Ears:      Comments: Hard of hearing  Eyes:      Pupils: Pupils are equal, round, and reactive to light.   Cardiovascular:      Rate and Rhythm: Normal rate.   Pulmonary:      Effort: Pulmonary effort is normal.   Abdominal:      Palpations: Abdomen is soft.      Tenderness: There is abdominal tenderness.   Musculoskeletal:         General: Normal range of motion.      Cervical back: Normal range of motion and neck supple.   Skin:     General: Skin is warm and dry.   Neurological:      Mental Status: He is alert and oriented to person, place, and time.   Psychiatric:         Behavior: Behavior normal.         Judgment: Judgment normal.           Lines/Drains:              Lab Results: I have reviewed the following results:   Results from last 7 days   Lab Units 02/10/25  0522 02/08/25  0512 02/07/25  0620   WBC Thousand/uL 11.83*   < > 10.74*   HEMOGLOBIN g/dL 8.5*   < > 9.9*    HEMATOCRIT % 24.9*   < > 26.9*   PLATELETS Thousands/uL 255   < > 192   SEGS PCT %  --   --  69   LYMPHO PCT %  --   --  19   MONO PCT %  --   --  8   EOS PCT %  --   --  3    < > = values in this interval not displayed.     Results from last 7 days   Lab Units 02/10/25  0522 02/08/25  0512 02/07/25  0620   SODIUM mmol/L 134*   < > 129*   POTASSIUM mmol/L 4.6   < > 3.8   CHLORIDE mmol/L 102   < > 98   CO2 mmol/L 26   < > 25   BUN mg/dL 30*   < > 12   CREATININE mg/dL 1.95*   < > 1.54*   ANION GAP mmol/L 6   < > 6   CALCIUM mg/dL 8.8   < > 8.1*   ALBUMIN g/dL  --   --  2.7*   TOTAL BILIRUBIN mg/dL  --   --  0.56   ALK PHOS U/L  --   --  207*   ALT U/L  --   --  52   AST U/L  --   --  23   GLUCOSE RANDOM mg/dL 147*   < > 323*    < > = values in this interval not displayed.         Results from last 7 days   Lab Units 02/10/25  1702 02/10/25  1118 02/10/25  0731 02/10/25  0534 02/09/25  2131 02/09/25  2102 02/09/25  1619 02/09/25  1130 02/09/25  0802 02/09/25  0407 02/08/25  2045 02/08/25  1619   POC GLUCOSE mg/dl 119 171* 180* 137 157* 36* 96 275* 265* 198* 175* 78     Results from last 7 days   Lab Units 02/05/25  2009   HEMOGLOBIN A1C % 9.8*           Recent Cultures (last 7 days):         Imaging Results Review: I reviewed radiology reports from this admission including: procedure reports.      Last 24 Hours Medication List:     Current Facility-Administered Medications:     acetaminophen (TYLENOL) tablet 650 mg, Q6H PRN    amLODIPine (NORVASC) tablet 5 mg, BID    diphenhydrAMINE (BENADRYL) injection 12.5 mg, Once PRN    folic acid (FOLVITE) tablet 1 mg, Daily    heparin (porcine) subcutaneous injection 5,000 Units, Q8H JAISON    hydrALAZINE (APRESOLINE) injection 10 mg, Q6H PRN    hydrALAZINE (APRESOLINE) tablet 25 mg, Q8H JAISON    [START ON 2/11/2025] insulin aspart protamine-insulin aspart (NovoLOG 70/30) 100 units/mL subcutaneous injection 5 Units, BID AC    insulin lispro (HumALOG/ADMELOG) 100 units/mL  subcutaneous injection 1-5 Units, TID AC **AND** Fingerstick Glucose (POCT), TID AC    insulin lispro (HumALOG/ADMELOG) 100 units/mL subcutaneous injection 1-5 Units, HS    labetalol (NORMODYNE) injection 10 mg, Q6H PRN    multivitamin-minerals (CENTRUM) tablet 1 tablet, Daily    ondansetron (ZOFRAN) injection 4 mg, Once PRN    pancrelipase (Lip-Prot-Amyl) (CREON) delayed release capsule 24,000 Units, TID With Meals    pantoprazole (PROTONIX) EC tablet 40 mg, Daily Before Breakfast    thiamine tablet 100 mg, Daily    Administrative Statements   Today, Patient Was Seen By: Collin Da Silva MD      **Please Note: This note may have been constructed using a voice recognition system.**

## 2025-02-11 NOTE — ASSESSMENT & PLAN NOTE
"Pt reports drinking \"heavily\" daily. He reports he is unable to quantify his drinking. He states that his last drink was approximately 3 days prior to admission.  Pt did have previous admission 10/2024 with witnessed seizure thought to be related to alcohol withdrawal/hyperglycemia  [facility] protocol discontinued  "

## 2025-02-11 NOTE — PLAN OF CARE
Problem: Potential for Falls  Goal: Patient will remain free of falls  Description: INTERVENTIONS:  - Educate patient/family on patient safety including physical limitations  - Instruct patient to call for assistance with activity   - Consult OT/PT to assist with strengthening/mobility   - Keep Call bell within reach  - Keep bed low and locked with side rails adjusted as appropriate  - Keep care items and personal belongings within reach  - Initiate and maintain comfort rounds  - Make Fall Risk Sign visible to staff  - Offer Toileting every 2 Hours, in advance of need  - Initiate/Maintain bed alarm  - Obtain necessary fall risk management equipment: alarms  - Apply yellow socks and bracelet for high fall risk patients  - Consider moving patient to room near nurses station  2/10/2025 2017 by Ingrid Vázquez RN  Outcome: Progressing  2/10/2025 0749 by Ingrid Vázquez RN  Outcome: Progressing     Problem: PAIN - ADULT  Goal: Verbalizes/displays adequate comfort level or baseline comfort level  Description: Interventions:  - Encourage patient to monitor pain and request assistance  - Assess pain using appropriate pain scale  - Administer analgesics based on type and severity of pain and evaluate response  - Implement non-pharmacological measures as appropriate and evaluate response  - Consider cultural and social influences on pain and pain management  - Notify physician/advanced practitioner if interventions unsuccessful or patient reports new pain  2/10/2025 2017 by Ingrid Vázquez RN  Outcome: Progressing  2/10/2025 0749 by Ingrid Vázquez RN  Outcome: Progressing     Problem: INFECTION - ADULT  Goal: Absence or prevention of progression during hospitalization  Description: INTERVENTIONS:  - Assess and monitor for signs and symptoms of infection  - Monitor lab/diagnostic results  - Monitor all insertion sites, i.e. indwelling lines, tubes, and drains  - Monitor endotracheal if appropriate and nasal secretions for changes in  amount and color  - Warden appropriate cooling/warming therapies per order  - Administer medications as ordered  - Instruct and encourage patient and family to use good hand hygiene technique  - Identify and instruct in appropriate isolation precautions for identified infection/condition  2/10/2025 2017 by Ingrid Vázquez RN  Outcome: Progressing  2/10/2025 0749 by Ingrid Vázquez RN  Outcome: Progressing     Problem: SAFETY ADULT  Goal: Patient will remain free of falls  Description: INTERVENTIONS:  - Educate patient/family on patient safety including physical limitations  - Instruct patient to call for assistance with activity   - Consult OT/PT to assist with strengthening/mobility   - Keep Call bell within reach  - Keep bed low and locked with side rails adjusted as appropriate  - Keep care items and personal belongings within reach  - Initiate and maintain comfort rounds  - Make Fall Risk Sign visible to staff  - Offer Toileting every 2 Hours, in advance of need  - Initiate/Maintain bed alarm  - Obtain necessary fall risk management equipment: alarms  - Apply yellow socks and bracelet for high fall risk patients  - Consider moving patient to room near nurses station  2/10/2025 2017 by Ingrid Vázquez RN  Outcome: Progressing  2/10/2025 0749 by Ingrid Vázquez RN  Outcome: Progressing  Goal: Maintain or return to baseline ADL function  Description: INTERVENTIONS:  -  Assess patient's ability to carry out ADLs; assess patient's baseline for ADL function and identify physical deficits which impact ability to perform ADLs (bathing, care of mouth/teeth, toileting, grooming, dressing, etc.)  - Assess/evaluate cause of self-care deficits   - Assess range of motion  - Assess patient's mobility; develop plan if impaired  - Assess patient's need for assistive devices and provide as appropriate  - Encourage maximum independence but intervene and supervise when necessary  - Involve family in performance of ADLs  - Assess for home  care needs following discharge   - Consider OT consult to assist with ADL evaluation and planning for discharge  - Provide patient education as appropriate  2/10/2025 2017 by Ingrid Vázquez RN  Outcome: Progressing  2/10/2025 0749 by Ingird Vázquez RN  Outcome: Progressing  Goal: Maintains/Returns to pre admission functional level  Description: INTERVENTIONS:  - Perform AM-PAC 6 Click Basic Mobility/ Daily Activity assessment daily.  - Set and communicate daily mobility goal to care team and patient/family/caregiver.   - Collaborate with rehabilitation services on mobility goals if consulted  - Perform Range of Motion 4 times a day.  - Reposition patient every 2 hours.  - Dangle patient 3 times a day  - Stand patient 3 times a day  - Ambulate patient 3 times a day  - Out of bed to chair 3 times a day   - Out of bed for meals 3 times a day  - Out of bed for toileting  - Record patient progress and toleration of activity level   2/10/2025 2017 by Ingrid Vázquez RN  Outcome: Progressing  2/10/2025 0749 by Ingrid Vázquez RN  Outcome: Progressing     Problem: DISCHARGE PLANNING  Goal: Discharge to home or other facility with appropriate resources  Description: INTERVENTIONS:  - Identify barriers to discharge w/patient and caregiver  - Arrange for needed discharge resources and transportation as appropriate  - Identify discharge learning needs (meds, wound care, etc.)  - Arrange for interpretive services to assist at discharge as needed  - Refer to Case Management Department for coordinating discharge planning if the patient needs post-hospital services based on physician/advanced practitioner order or complex needs related to functional status, cognitive ability, or social support system  2/10/2025 2017 by Ingrid Vázquez RN  Outcome: Progressing  2/10/2025 0749 by Ingrid Vázquez RN  Outcome: Progressing     Problem: Knowledge Deficit  Goal: Patient/family/caregiver demonstrates understanding of disease process, treatment plan,  medications, and discharge instructions  Description: Complete learning assessment and assess knowledge base.  Interventions:  - Provide teaching at level of understanding  - Provide teaching via preferred learning methods  2/10/2025 2017 by Ingrid Vázquez RN  Outcome: Progressing  2/10/2025 0749 by Ingrid Vázquez RN  Outcome: Progressing     Problem: Nutrition/Hydration-ADULT  Goal: Nutrient/Hydration intake appropriate for improving, restoring or maintaining nutritional needs  Description: Monitor and assess patient's nutrition/hydration status for malnutrition. Collaborate with interdisciplinary team and initiate plan and interventions as ordered.  Monitor patient's weight and dietary intake as ordered or per policy. Utilize nutrition screening tool and intervene as necessary. Determine patient's food preferences and provide high-protein, high-caloric foods as appropriate.     INTERVENTIONS:  - Monitor oral intake, urinary output, labs, and treatment plans  - Assess nutrition and hydration status and recommend course of action  - Evaluate amount of meals eaten  - Assist patient with eating if necessary   - Allow adequate time for meals  - Recommend/ encourage appropriate diets, oral nutritional supplements, and vitamin/mineral supplements  - Order, calculate, and assess calorie counts as needed  - Recommend, monitor, and adjust tube feedings and TPN/PPN based on assessed needs  - Assess need for intravenous fluids  - Provide specific nutrition/hydration education as appropriate  - Include patient/family/caregiver in decisions related to nutrition  2/10/2025 2017 by Ingrid Vázquez RN  Outcome: Progressing  2/10/2025 0749 by Ingrid Vázquez RN  Outcome: Progressing     Problem: NEUROSENSORY - ADULT  Goal: Achieves maximal functionality and self care  Description: INTERVENTIONS  - Monitor swallowing and airway patency with patient fatigue and changes in neurological status  - Encourage and assist patient to increase activity  and self care.   - Encourage visually impaired, hearing impaired and aphasic patients to use assistive/communication devices  2/10/2025 2017 by Ingrid Vázquez RN  Outcome: Progressing  2/10/2025 0749 by Ingrid Vázquez RN  Outcome: Progressing     Problem: GASTROINTESTINAL - ADULT  Goal: Maintains adequate nutritional intake  Description: INTERVENTIONS:  - Monitor percentage of each meal consumed  - Identify factors contributing to decreased intake, treat as appropriate  - Assist with meals as needed  - Monitor I&O, weight, and lab values if indicated  - Obtain nutrition services referral as needed  2/10/2025 2017 by Ingrid Vázquez RN  Outcome: Progressing  2/10/2025 0749 by Ingrid Vázquez RN  Outcome: Progressing     Problem: METABOLIC, FLUID AND ELECTROLYTES - ADULT  Goal: Electrolytes maintained within normal limits  Description: INTERVENTIONS:  - Monitor labs and assess patient for signs and symptoms of electrolyte imbalances  - Administer electrolyte replacement as ordered  - Monitor response to electrolyte replacements, including repeat lab results as appropriate  - Instruct patient on fluid and nutrition as appropriate  2/10/2025 2017 by Ingrid Vázquez RN  Outcome: Progressing  2/10/2025 0749 by Ingrid Vázquez RN  Outcome: Progressing  Goal: Glucose maintained within target range  Description: INTERVENTIONS:  - Monitor Blood Glucose as ordered  - Assess for signs and symptoms of hyperglycemia and hypoglycemia  - Administer ordered medications to maintain glucose within target range  - Assess nutritional intake and initiate nutrition service referral as needed  2/10/2025 2017 by Ingrid Vázquez RN  Outcome: Progressing  2/10/2025 0749 by Ingrid Vázquez RN  Outcome: Progressing     Problem: SKIN/TISSUE INTEGRITY - ADULT  Goal: Skin Integrity remains intact(Skin Breakdown Prevention)  Description: Assess:  -Perform Jose assessment every shift  -Clean and moisturize skin every shift  -Inspect skin when repositioning, toileting,  and assisting with ADLS  -Assess under medical devices such as Masimo every shift  -Assess extremities for adequate circulation and sensation     Bed Management:  -Have minimal linens on bed & keep smooth, unwrinkled  -Change linens as needed when moist or perspiring  -Avoid sitting or lying in one position for more than 1 hours while in bed  -Keep HOB at 30 degrees     Toileting:  -Offer bedside commode  -Assess for incontinence every shift  -Use incontinent care products after each incontinent episode such as Shield wipes    Activity:  -Mobilize patient 4 times a day  -Encourage activity and walks on unit  -Encourage or provide ROM exercises   -Turn and reposition patient every 2 Hours  -Use appropriate equipment to lift or move patient in bed  -Instruct/ Assist with weight shifting every 1 hour when out of bed in chair  -Consider limitation of chair time 1 hour intervals    Skin Care:  -Avoid use of baby powder, tape, friction and shearing, hot water or constrictive clothing  -Relieve pressure over bony prominences using pillows  -Do not massage red bony areas    Next Steps:  -Teach patient strategies to minimize risks such as skin break down    -Consider consults to  interdisciplinary teams such as wound care nurse   2/10/2025 2017 by Ingrid Vázquez, RN  Outcome: Progressing  2/10/2025 0749 by Ingrid Vázquez, RN  Outcome: Progressing

## 2025-02-11 NOTE — ASSESSMENT & PLAN NOTE
Lab Results   Component Value Date    EGFR 29 02/11/2025    EGFR 37 02/10/2025    EGFR 32 02/09/2025    CREATININE 2.37 (H) 02/11/2025    CREATININE 1.95 (H) 02/10/2025    CREATININE 2.21 (H) 02/09/2025     Baseline of 1.1 ~ 1.54.  Elevated, continue monitoring for now. Urinary retention protocol    2/11-update: Unfortunately, patient's creatinine is worsening; currently has acute on chronic renal failure with a creatinine of 2.37.  Most likely, this represents volume depletion after being n.p.o. for EGD/colonoscopy and undergoing bowel prep.  UA on admission reviewed, will repeat and obtain Fe urea.  Challenge with gentle IV fluids.  Low threshold to consult nephrology if does not improve with gentle IV hydration.

## 2025-02-12 ENCOUNTER — RESULTS FOLLOW-UP (OUTPATIENT)
Age: 55
End: 2025-02-12

## 2025-02-12 PROCEDURE — 88305 TISSUE EXAM BY PATHOLOGIST: CPT | Performed by: PATHOLOGY

## 2025-02-12 NOTE — UTILIZATION REVIEW
NOTIFICATION OF ADMISSION DISCHARGE   This is a Notification of Discharge from [facility]. Please be advised that this patient has been discharge from our facility. Below you will find the admission and discharge date and time including the patient’s disposition.   UTILIZATION REVIEW CONTACT:  Ingrid Kim  Utilization   Network Utilization Review Department  Phone: [phone] x carefully listen to the prompts. All voicemails are confidential.  Email: [email or web address]     ADMISSION INFORMATION  PRESENTATION DATE: 2/5/2025  7:52 PM  OBERVATION ADMISSION DATE: N/A  INPATIENT ADMISSION DATE: 2/5/25 10:42 PM   DISCHARGE DATE: 2/11/2025  4:31 PM   DISPOSITION:Left against medical advice or discontinued care    Network Utilization Review Department  ATTENTION: Please call with any questions or concerns to [phone] and carefully listen to the prompts so that you are directed to the right person. All voicemails are confidential.   For Discharge needs, contact Care Management DC Support Team at [phone] opt. 2  Send all requests for admission clinical reviews, approved or denied determinations and any other requests to dedicated fax number below belonging to the campus where the patient is receiving treatment. List of dedicated fax numbers for the Facilities:  FACILITY NAME UR FAX NUMBER   ADMISSION DENIALS (Administrative/Medical Necessity) [phone]   DISCHARGE SUPPORT TEAM ([facility]) [phone]   PARENT CHILD HEALTH (Maternity/NICU/Pediatrics) [phone]   [facility] [phone]   [facility] [phone]   [facility] [phone]   [facility] [phone]   [facility] [phone]   [facility] [phone]   [facility] [phone]   [facility]  [facility] [phone]   [facility] [phone]   [facility] [phone]   [facility] [phone]   [facility] [phone]

## 2025-02-13 ENCOUNTER — TELEPHONE (OUTPATIENT)
Dept: GASTROENTEROLOGY | Facility: MEDICAL CENTER | Age: 55
End: 2025-02-13

## 2025-02-13 NOTE — TELEPHONE ENCOUNTER
Called patient to schedule follow up. Had called earlier but patient was sleeping, called now to schedule but patient is eating asked to call tomorrow.       needs clinic appointment with advanced team  Received: Today  DO BENNETT Garcia [facility] [place] Clerical  Good morning,    Can you please call patient to schedule appointment in advanced clinic? Needs appointment to discuss possible EUS for evaluation of pancreatic abnormalities and chronic pancreatitis. Thanks,    Sandip

## 2025-03-16 ENCOUNTER — APPOINTMENT (EMERGENCY)
Dept: RADIOLOGY | Facility: HOSPITAL | Age: 55
End: 2025-03-16
Payer: COMMERCIAL

## 2025-03-16 ENCOUNTER — HOSPITAL ENCOUNTER (INPATIENT)
Facility: HOSPITAL | Age: 55
LOS: 2 days | Discharge: HOME | End: 2025-03-18
Attending: EMERGENCY MEDICINE | Admitting: HOSPITALIST
Payer: COMMERCIAL

## 2025-03-16 ENCOUNTER — APPOINTMENT (EMERGENCY)
Dept: CT IMAGING | Facility: HOSPITAL | Age: 55
End: 2025-03-16
Payer: COMMERCIAL

## 2025-03-16 DIAGNOSIS — E11.9 TYPE 2 DIABETES MELLITUS (HCC): ICD-10-CM

## 2025-03-16 DIAGNOSIS — E87.20 METABOLIC ACIDOSIS: ICD-10-CM

## 2025-03-16 DIAGNOSIS — E87.20 LACTIC ACIDOSIS: ICD-10-CM

## 2025-03-16 DIAGNOSIS — E11.65 TYPE 2 DIABETES MELLITUS WITH HYPERGLYCEMIA, WITHOUT LONG-TERM CURRENT USE OF INSULIN (HCC): ICD-10-CM

## 2025-03-16 DIAGNOSIS — E16.2 HYPOGLYCEMIA: ICD-10-CM

## 2025-03-16 DIAGNOSIS — R65.10 SIRS (SYSTEMIC INFLAMMATORY RESPONSE SYNDROME) (HCC): ICD-10-CM

## 2025-03-16 DIAGNOSIS — E83.42 HYPOMAGNESEMIA: ICD-10-CM

## 2025-03-16 DIAGNOSIS — E11.00 TYPE 2 DIABETES MELLITUS WITH HYPEROSMOLARITY WITHOUT COMA, WITHOUT LONG-TERM CURRENT USE OF INSULIN (HCC): ICD-10-CM

## 2025-03-16 DIAGNOSIS — G93.41 ACUTE METABOLIC ENCEPHALOPATHY: Primary | ICD-10-CM

## 2025-03-16 DIAGNOSIS — I10 ESSENTIAL HYPERTENSION: ICD-10-CM

## 2025-03-16 DIAGNOSIS — F10.139 ALCOHOL ABUSE WITH WITHDRAWAL (HCC): ICD-10-CM

## 2025-03-16 DIAGNOSIS — T68.XXXA HYPOTHERMIA, INITIAL ENCOUNTER: ICD-10-CM

## 2025-03-16 LAB
ALBUMIN SERPL BCG-MCNC: 3.3 G/DL (ref 3.5–5)
ALP SERPL-CCNC: 123 U/L (ref 34–104)
ALT SERPL W P-5'-P-CCNC: 61 U/L (ref 7–52)
AMMONIA PLAS-SCNC: 34 UMOL/L (ref 18–72)
ANION GAP SERPL CALCULATED.3IONS-SCNC: 3 MMOL/L (ref 4–13)
ANION GAP SERPL CALCULATED.3IONS-SCNC: 9 MMOL/L (ref 4–13)
APAP SERPL-MCNC: <2 UG/ML (ref 10–20)
APTT PPP: 24 SECONDS (ref 23–34)
AST SERPL W P-5'-P-CCNC: 46 U/L (ref 13–39)
ATRIAL RATE: 79 BPM
ATRIAL RATE: 94 BPM
BACTERIA UR QL AUTO: NORMAL /HPF
BASE EX.OXY STD BLDV CALC-SCNC: 56 % (ref 60–80)
BASE EX.OXY STD BLDV CALC-SCNC: 72.1 % (ref 60–80)
BASE EXCESS BLDV CALC-SCNC: -13.9 MMOL/L
BASE EXCESS BLDV CALC-SCNC: -6 MMOL/L
BASOPHILS # BLD AUTO: 0.03 THOUSANDS/ÂΜL (ref 0–0.1)
BASOPHILS # BLD AUTO: 0.06 THOUSANDS/ÂΜL (ref 0–0.1)
BASOPHILS NFR BLD AUTO: 0 % (ref 0–1)
BASOPHILS NFR BLD AUTO: 1 % (ref 0–1)
BILIRUB SERPL-MCNC: 0.39 MG/DL (ref 0.2–1)
BILIRUB UR QL STRIP: NEGATIVE
BUN SERPL-MCNC: 15 MG/DL (ref 5–25)
BUN SERPL-MCNC: 15 MG/DL (ref 5–25)
CA-I BLD-SCNC: 1.16 MMOL/L (ref 1.12–1.32)
CALCIUM ALBUM COR SERPL-MCNC: 8.8 MG/DL (ref 8.3–10.1)
CALCIUM SERPL-MCNC: 7.9 MG/DL (ref 8.4–10.2)
CALCIUM SERPL-MCNC: 8.2 MG/DL (ref 8.4–10.2)
CHLORIDE SERPL-SCNC: 111 MMOL/L (ref 96–108)
CHLORIDE SERPL-SCNC: 113 MMOL/L (ref 96–108)
CK SERPL-CCNC: 61 U/L (ref 39–308)
CLARITY UR: CLEAR
CO2 SERPL-SCNC: 19 MMOL/L (ref 21–32)
CO2 SERPL-SCNC: 22 MMOL/L (ref 21–32)
COLOR UR: COLORLESS
CREAT SERPL-MCNC: 1.21 MG/DL (ref 0.6–1.3)
CREAT SERPL-MCNC: 1.43 MG/DL (ref 0.6–1.3)
EOSINOPHIL # BLD AUTO: 0.01 THOUSAND/ÂΜL (ref 0–0.61)
EOSINOPHIL # BLD AUTO: 0.06 THOUSAND/ÂΜL (ref 0–0.61)
EOSINOPHIL NFR BLD AUTO: 0 % (ref 0–6)
EOSINOPHIL NFR BLD AUTO: 1 % (ref 0–6)
ERYTHROCYTE [DISTWIDTH] IN BLOOD BY AUTOMATED COUNT: 11.9 % (ref 11.6–15.1)
ERYTHROCYTE [DISTWIDTH] IN BLOOD BY AUTOMATED COUNT: 12 % (ref 11.6–15.1)
ETHANOL SERPL-MCNC: <10 MG/DL
FLUAV RNA RESP QL NAA+PROBE: NEGATIVE
FLUBV RNA RESP QL NAA+PROBE: NEGATIVE
GFR SERPL CREATININE-BSD FRML MDRD: 54 ML/MIN/1.73SQ M
GFR SERPL CREATININE-BSD FRML MDRD: 66 ML/MIN/1.73SQ M
GLUCOSE SERPL-MCNC: 100 MG/DL (ref 65–140)
GLUCOSE SERPL-MCNC: 105 MG/DL (ref 65–140)
GLUCOSE SERPL-MCNC: 107 MG/DL (ref 65–140)
GLUCOSE SERPL-MCNC: 114 MG/DL (ref 65–140)
GLUCOSE SERPL-MCNC: 118 MG/DL (ref 65–140)
GLUCOSE SERPL-MCNC: 121 MG/DL (ref 65–140)
GLUCOSE SERPL-MCNC: 126 MG/DL (ref 65–140)
GLUCOSE SERPL-MCNC: 136 MG/DL (ref 65–140)
GLUCOSE SERPL-MCNC: 147 MG/DL (ref 65–140)
GLUCOSE SERPL-MCNC: 154 MG/DL (ref 65–140)
GLUCOSE SERPL-MCNC: 158 MG/DL (ref 65–140)
GLUCOSE SERPL-MCNC: 169 MG/DL (ref 65–140)
GLUCOSE SERPL-MCNC: 179 MG/DL (ref 65–140)
GLUCOSE SERPL-MCNC: 184 MG/DL (ref 65–140)
GLUCOSE SERPL-MCNC: 190 MG/DL (ref 65–140)
GLUCOSE SERPL-MCNC: 201 MG/DL (ref 65–140)
GLUCOSE SERPL-MCNC: 270 MG/DL (ref 65–140)
GLUCOSE SERPL-MCNC: 273 MG/DL (ref 65–140)
GLUCOSE SERPL-MCNC: 296 MG/DL (ref 65–140)
GLUCOSE SERPL-MCNC: 37 MG/DL (ref 65–140)
GLUCOSE SERPL-MCNC: 69 MG/DL (ref 65–140)
GLUCOSE SERPL-MCNC: 69 MG/DL (ref 65–140)
GLUCOSE SERPL-MCNC: 81 MG/DL (ref 65–140)
GLUCOSE SERPL-MCNC: 87 MG/DL (ref 65–140)
GLUCOSE UR STRIP-MCNC: NEGATIVE MG/DL
HCO3 BLDV-SCNC: 15 MMOL/L (ref 24–30)
HCO3 BLDV-SCNC: 20.5 MMOL/L (ref 24–30)
HCT VFR BLD AUTO: 22.8 % (ref 36.5–49.3)
HCT VFR BLD AUTO: 28.7 % (ref 36.5–49.3)
HGB BLD-MCNC: 7.9 G/DL (ref 12–17)
HGB BLD-MCNC: 9.4 G/DL (ref 12–17)
HGB UR QL STRIP.AUTO: ABNORMAL
IMM GRANULOCYTES # BLD AUTO: 0.12 THOUSAND/UL (ref 0–0.2)
IMM GRANULOCYTES # BLD AUTO: 0.17 THOUSAND/UL (ref 0–0.2)
IMM GRANULOCYTES NFR BLD AUTO: 1 % (ref 0–2)
IMM GRANULOCYTES NFR BLD AUTO: 1 % (ref 0–2)
INR PPP: 1.11 (ref 0.85–1.19)
INSULIN SERPL-ACNC: 6.27 UIU/ML
KETONES UR STRIP-MCNC: NEGATIVE MG/DL
LACTATE SERPL-SCNC: 1 MMOL/L (ref 0.5–2)
LACTATE SERPL-SCNC: 6.5 MMOL/L (ref 0.5–2)
LEUKOCYTE ESTERASE UR QL STRIP: NEGATIVE
LYMPHOCYTES # BLD AUTO: 1.48 THOUSANDS/ÂΜL (ref 0.6–4.47)
LYMPHOCYTES # BLD AUTO: 2.15 THOUSANDS/ÂΜL (ref 0.6–4.47)
LYMPHOCYTES NFR BLD AUTO: 17 % (ref 14–44)
LYMPHOCYTES NFR BLD AUTO: 8 % (ref 14–44)
MAGNESIUM SERPL-MCNC: 1.8 MG/DL (ref 1.9–2.7)
MAGNESIUM SERPL-MCNC: 2.6 MG/DL (ref 1.9–2.7)
MCH RBC QN AUTO: 30.2 PG (ref 26.8–34.3)
MCH RBC QN AUTO: 31 PG (ref 26.8–34.3)
MCHC RBC AUTO-ENTMCNC: 32.8 G/DL (ref 31.4–37.4)
MCHC RBC AUTO-ENTMCNC: 34.6 G/DL (ref 31.4–37.4)
MCV RBC AUTO: 89 FL (ref 82–98)
MCV RBC AUTO: 92 FL (ref 82–98)
MONOCYTES # BLD AUTO: 0.58 THOUSAND/ÂΜL (ref 0.17–1.22)
MONOCYTES # BLD AUTO: 0.98 THOUSAND/ÂΜL (ref 0.17–1.22)
MONOCYTES NFR BLD AUTO: 5 % (ref 4–12)
MONOCYTES NFR BLD AUTO: 5 % (ref 4–12)
NEUTROPHILS # BLD AUTO: 16.25 THOUSANDS/ÂΜL (ref 1.85–7.62)
NEUTROPHILS # BLD AUTO: 9.83 THOUSANDS/ÂΜL (ref 1.85–7.62)
NEUTS SEG NFR BLD AUTO: 75 % (ref 43–75)
NEUTS SEG NFR BLD AUTO: 86 % (ref 43–75)
NITRITE UR QL STRIP: NEGATIVE
NON-SQ EPI CELLS URNS QL MICRO: NORMAL /HPF
NRBC BLD AUTO-RTO: 0 /100 WBCS
NRBC BLD AUTO-RTO: 0 /100 WBCS
O2 CT BLDV-SCNC: 11.1 ML/DL
O2 CT BLDV-SCNC: 6.7 ML/DL
P AXIS: 55 DEGREES
P AXIS: 56 DEGREES
PCO2 BLDV: 45.4 MM HG (ref 42–50)
PCO2 BLDV: 47.8 MM HG (ref 42–50)
PH BLDV: 7.12 [PH] (ref 7.3–7.4)
PH BLDV: 7.27 [PH] (ref 7.3–7.4)
PH UR STRIP.AUTO: 5.5 [PH]
PHOSPHATE SERPL-MCNC: 4 MG/DL (ref 2.7–4.5)
PLATELET # BLD AUTO: 198 THOUSANDS/UL (ref 149–390)
PLATELET # BLD AUTO: 247 THOUSANDS/UL (ref 149–390)
PMV BLD AUTO: 10.3 FL (ref 8.9–12.7)
PMV BLD AUTO: 10.7 FL (ref 8.9–12.7)
PO2 BLDV: 29.5 MM HG (ref 35–45)
PO2 BLDV: 45.8 MM HG (ref 35–45)
POTASSIUM SERPL-SCNC: 3.6 MMOL/L (ref 3.5–5.3)
POTASSIUM SERPL-SCNC: 3.8 MMOL/L (ref 3.5–5.3)
PR INTERVAL: 128 MS
PR INTERVAL: 148 MS
PROCALCITONIN SERPL-MCNC: 0.14 NG/ML
PROCALCITONIN SERPL-MCNC: 0.5 NG/ML
PROT SERPL-MCNC: 5.8 G/DL (ref 6.4–8.4)
PROT UR STRIP-MCNC: ABNORMAL MG/DL
PROTHROMBIN TIME: 14.5 SECONDS (ref 12.3–15)
QRS AXIS: 17 DEGREES
QRS AXIS: 18 DEGREES
QRSD INTERVAL: 94 MS
QRSD INTERVAL: 94 MS
QT INTERVAL: 406 MS
QT INTERVAL: 418 MS
QTC INTERVAL: 479 MS
QTC INTERVAL: 507 MS
RBC # BLD AUTO: 2.55 MILLION/UL (ref 3.88–5.62)
RBC # BLD AUTO: 3.11 MILLION/UL (ref 3.88–5.62)
RBC #/AREA URNS AUTO: NORMAL /HPF
RSV RNA RESP QL NAA+PROBE: NEGATIVE
SALICYLATES SERPL-MCNC: <5 MG/DL (ref 3–20)
SARS-COV-2 RNA RESP QL NAA+PROBE: NEGATIVE
SODIUM SERPL-SCNC: 138 MMOL/L (ref 135–147)
SODIUM SERPL-SCNC: 139 MMOL/L (ref 135–147)
SP GR UR STRIP.AUTO: 1.01 (ref 1–1.03)
T WAVE AXIS: 18 DEGREES
T WAVE AXIS: 25 DEGREES
UROBILINOGEN UR STRIP-ACNC: <2 MG/DL
VENTRICULAR RATE: 79 BPM
VENTRICULAR RATE: 94 BPM
WBC # BLD AUTO: 12.8 THOUSAND/UL (ref 4.31–10.16)
WBC # BLD AUTO: 18.92 THOUSAND/UL (ref 4.31–10.16)
WBC #/AREA URNS AUTO: NORMAL /HPF

## 2025-03-16 PROCEDURE — 82948 REAGENT STRIP/BLOOD GLUCOSE: CPT

## 2025-03-16 PROCEDURE — 80053 COMPREHEN METABOLIC PANEL: CPT

## 2025-03-16 PROCEDURE — 36415 COLL VENOUS BLD VENIPUNCTURE: CPT

## 2025-03-16 PROCEDURE — 99291 CRITICAL CARE FIRST HOUR: CPT | Performed by: EMERGENCY MEDICINE

## 2025-03-16 PROCEDURE — 82140 ASSAY OF AMMONIA: CPT

## 2025-03-16 PROCEDURE — 84100 ASSAY OF PHOSPHORUS: CPT | Performed by: NURSE PRACTITIONER

## 2025-03-16 PROCEDURE — 83735 ASSAY OF MAGNESIUM: CPT | Performed by: NURSE PRACTITIONER

## 2025-03-16 PROCEDURE — 80143 DRUG ASSAY ACETAMINOPHEN: CPT

## 2025-03-16 PROCEDURE — 84145 PROCALCITONIN (PCT): CPT

## 2025-03-16 PROCEDURE — 80048 BASIC METABOLIC PNL TOTAL CA: CPT | Performed by: NURSE PRACTITIONER

## 2025-03-16 PROCEDURE — 85610 PROTHROMBIN TIME: CPT

## 2025-03-16 PROCEDURE — 83735 ASSAY OF MAGNESIUM: CPT

## 2025-03-16 PROCEDURE — 96366 THER/PROPH/DIAG IV INF ADDON: CPT

## 2025-03-16 PROCEDURE — 87040 BLOOD CULTURE FOR BACTERIA: CPT

## 2025-03-16 PROCEDURE — 82550 ASSAY OF CK (CPK): CPT

## 2025-03-16 PROCEDURE — 84145 PROCALCITONIN (PCT): CPT | Performed by: NURSE PRACTITIONER

## 2025-03-16 PROCEDURE — 93010 ELECTROCARDIOGRAM REPORT: CPT | Performed by: STUDENT IN AN ORGANIZED HEALTH CARE EDUCATION/TRAINING PROGRAM

## 2025-03-16 PROCEDURE — 99291 CRITICAL CARE FIRST HOUR: CPT

## 2025-03-16 PROCEDURE — 0241U HB NFCT DS VIR RESP RNA 4 TRGT: CPT

## 2025-03-16 PROCEDURE — 85730 THROMBOPLASTIN TIME PARTIAL: CPT

## 2025-03-16 PROCEDURE — 93005 ELECTROCARDIOGRAM TRACING: CPT

## 2025-03-16 PROCEDURE — 85025 COMPLETE CBC W/AUTO DIFF WBC: CPT

## 2025-03-16 PROCEDURE — 96367 TX/PROPH/DG ADDL SEQ IV INF: CPT

## 2025-03-16 PROCEDURE — 82805 BLOOD GASES W/O2 SATURATION: CPT | Performed by: NURSE PRACTITIONER

## 2025-03-16 PROCEDURE — 99223 1ST HOSP IP/OBS HIGH 75: CPT | Performed by: NURSE PRACTITIONER

## 2025-03-16 PROCEDURE — 81001 URINALYSIS AUTO W/SCOPE: CPT

## 2025-03-16 PROCEDURE — 82330 ASSAY OF CALCIUM: CPT | Performed by: NURSE PRACTITIONER

## 2025-03-16 PROCEDURE — 96365 THER/PROPH/DIAG IV INF INIT: CPT

## 2025-03-16 PROCEDURE — 71045 X-RAY EXAM CHEST 1 VIEW: CPT

## 2025-03-16 PROCEDURE — 85025 COMPLETE CBC W/AUTO DIFF WBC: CPT | Performed by: NURSE PRACTITIONER

## 2025-03-16 PROCEDURE — 70450 CT HEAD/BRAIN W/O DYE: CPT

## 2025-03-16 PROCEDURE — 83605 ASSAY OF LACTIC ACID: CPT

## 2025-03-16 PROCEDURE — 82077 ASSAY SPEC XCP UR&BREATH IA: CPT

## 2025-03-16 PROCEDURE — 80179 DRUG ASSAY SALICYLATE: CPT

## 2025-03-16 PROCEDURE — 83525 ASSAY OF INSULIN: CPT | Performed by: NURSE PRACTITIONER

## 2025-03-16 PROCEDURE — 86337 INSULIN ANTIBODIES: CPT | Performed by: NURSE PRACTITIONER

## 2025-03-16 PROCEDURE — 82805 BLOOD GASES W/O2 SATURATION: CPT

## 2025-03-16 RX ORDER — CHLORHEXIDINE GLUCONATE ORAL RINSE 1.2 MG/ML
15 SOLUTION DENTAL EVERY 12 HOURS SCHEDULED
Status: DISCONTINUED | OUTPATIENT
Start: 2025-03-16 | End: 2025-03-16

## 2025-03-16 RX ORDER — DEXTROSE MONOHYDRATE 25 G/50ML
INJECTION, SOLUTION INTRAVENOUS
Status: COMPLETED
Start: 2025-03-16 | End: 2025-03-16

## 2025-03-16 RX ORDER — FOLIC ACID 1 MG/1
1 TABLET ORAL DAILY
Status: CANCELLED | OUTPATIENT
Start: 2025-03-17

## 2025-03-16 RX ORDER — MAGNESIUM SULFATE HEPTAHYDRATE 40 MG/ML
4 INJECTION, SOLUTION INTRAVENOUS ONCE
Status: COMPLETED | OUTPATIENT
Start: 2025-03-16 | End: 2025-03-16

## 2025-03-16 RX ORDER — LANOLIN ALCOHOL/MO/W.PET/CERES
50 CREAM (GRAM) TOPICAL DAILY
Status: DISCONTINUED | OUTPATIENT
Start: 2025-03-16 | End: 2025-03-18 | Stop reason: HOSPADM

## 2025-03-16 RX ORDER — AMLODIPINE BESYLATE 5 MG/1
5 TABLET ORAL 2 TIMES DAILY
Status: DISCONTINUED | OUTPATIENT
Start: 2025-03-16 | End: 2025-03-18 | Stop reason: HOSPADM

## 2025-03-16 RX ORDER — LANOLIN ALCOHOL/MO/W.PET/CERES
50 CREAM (GRAM) TOPICAL DAILY
Status: CANCELLED | OUTPATIENT
Start: 2025-03-17

## 2025-03-16 RX ORDER — HEPARIN SODIUM 5000 [USP'U]/ML
5000 INJECTION, SOLUTION INTRAVENOUS; SUBCUTANEOUS EVERY 8 HOURS SCHEDULED
Status: DISCONTINUED | OUTPATIENT
Start: 2025-03-16 | End: 2025-03-18 | Stop reason: HOSPADM

## 2025-03-16 RX ORDER — DEXTROSE MONOHYDRATE 25 G/50ML
25 INJECTION, SOLUTION INTRAVENOUS ONCE
Status: COMPLETED | OUTPATIENT
Start: 2025-03-16 | End: 2025-03-16

## 2025-03-16 RX ORDER — FOLIC ACID 1 MG/1
1 TABLET ORAL DAILY
Status: DISCONTINUED | OUTPATIENT
Start: 2025-03-16 | End: 2025-03-18 | Stop reason: HOSPADM

## 2025-03-16 RX ORDER — DEXTROSE MONOHYDRATE 25 G/50ML
50 INJECTION, SOLUTION INTRAVENOUS ONCE
Status: COMPLETED | OUTPATIENT
Start: 2025-03-16 | End: 2025-03-16

## 2025-03-16 RX ORDER — LABETALOL HYDROCHLORIDE 5 MG/ML
10 INJECTION, SOLUTION INTRAVENOUS EVERY 6 HOURS
Status: DISCONTINUED | OUTPATIENT
Start: 2025-03-16 | End: 2025-03-18 | Stop reason: HOSPADM

## 2025-03-16 RX ORDER — AMLODIPINE BESYLATE 5 MG/1
5 TABLET ORAL 2 TIMES DAILY
Status: CANCELLED | OUTPATIENT
Start: 2025-03-16

## 2025-03-16 RX ADMIN — HEPARIN SODIUM 5000 UNITS: 5000 INJECTION INTRAVENOUS; SUBCUTANEOUS at 06:03

## 2025-03-16 RX ADMIN — DEXTROSE MONOHYDRATE 50 ML: 25 INJECTION, SOLUTION INTRAVENOUS at 04:00

## 2025-03-16 RX ADMIN — MAGNESIUM SULFATE HEPTAHYDRATE 4 G: 40 INJECTION, SOLUTION INTRAVENOUS at 02:22

## 2025-03-16 RX ADMIN — DEXTROSE MONOHYDRATE 25 ML: 25 INJECTION, SOLUTION INTRAVENOUS at 06:18

## 2025-03-16 RX ADMIN — AMLODIPINE BESYLATE 5 MG: 5 TABLET ORAL at 17:14

## 2025-03-16 RX ADMIN — CHLORHEXIDINE GLUCONATE 15 ML: 1.2 RINSE ORAL at 09:05

## 2025-03-16 RX ADMIN — LABETALOL HYDROCHLORIDE 10 MG: 5 INJECTION, SOLUTION INTRAVENOUS at 17:14

## 2025-03-16 RX ADMIN — HEPARIN SODIUM 5000 UNITS: 5000 INJECTION INTRAVENOUS; SUBCUTANEOUS at 23:00

## 2025-03-16 RX ADMIN — LABETALOL HYDROCHLORIDE 10 MG: 5 INJECTION, SOLUTION INTRAVENOUS at 11:00

## 2025-03-16 RX ADMIN — LABETALOL HYDROCHLORIDE 10 MG: 5 INJECTION, SOLUTION INTRAVENOUS at 22:57

## 2025-03-16 RX ADMIN — SODIUM CHLORIDE, SODIUM LACTATE, POTASSIUM CHLORIDE, AND CALCIUM CHLORIDE 1000 ML: .6; .31; .03; .02 INJECTION, SOLUTION INTRAVENOUS at 01:55

## 2025-03-16 RX ADMIN — SODIUM CHLORIDE 75 ML/HR: 4 INJECTION, SOLUTION, CONCENTRATE INTRAVENOUS at 03:20

## 2025-03-16 RX ADMIN — THIAMINE HYDROCHLORIDE 100 MG: 100 INJECTION, SOLUTION INTRAMUSCULAR; INTRAVENOUS at 09:05

## 2025-03-16 RX ADMIN — LABETALOL HYDROCHLORIDE 10 MG: 5 INJECTION, SOLUTION INTRAVENOUS at 06:01

## 2025-03-16 RX ADMIN — PANCRELIPASE 24000 UNITS: 120000; 24000; 76000 CAPSULE, DELAYED RELEASE PELLETS ORAL at 17:14

## 2025-03-16 RX ADMIN — PANCRELIPASE 24000 UNITS: 120000; 24000; 76000 CAPSULE, DELAYED RELEASE PELLETS ORAL at 12:45

## 2025-03-16 RX ADMIN — SODIUM CHLORIDE 75 ML/HR: 4 INJECTION, SOLUTION, CONCENTRATE INTRAVENOUS at 01:36

## 2025-03-16 RX ADMIN — HEPARIN SODIUM 5000 UNITS: 5000 INJECTION INTRAVENOUS; SUBCUTANEOUS at 13:28

## 2025-03-16 NOTE — ASSESSMENT & PLAN NOTE
Lab Results   Component Value Date    EGFR 54 03/16/2025    EGFR 29 02/11/2025    EGFR 37 02/10/2025    CREATININE 1.43 (H) 03/16/2025    CREATININE 2.37 (H) 02/11/2025    CREATININE 1.95 (H) 02/10/2025     Baseline creatinine 1.3-1.7  Currently 1.43    Plan:  Monitor UO and renal indices  BMP in am   Avoid hypotension, nephrotoxins

## 2025-03-16 NOTE — ASSESSMENT & PLAN NOTE
Recent Labs     03/16/25  0120 03/16/25  0356   LACTICACID 6.5* 1.0     Likely in the setting of profound hypoglycemia.    It is possible that he had a seizure in the setting of hypoglycemia, though no seizure-like activity was reported.   Received 1L IVF in the ER  Lactic acid cleared, no further need to trend

## 2025-03-16 NOTE — ASSESSMENT & PLAN NOTE
Lab Results   Component Value Date    HGBA1C 9.8 (H) 02/05/2025       Recent Labs     03/16/25  0308 03/16/25  0355 03/16/25  0427 03/16/25  0453   POCGLU 69 37* 136 105       Blood Sugar Average: Last 72 hrs:  (P) 100.875    PTA: NPH insulin 5 units bid.    Reportedly patient was taking 25 units bid    Plan:  Holding PTA meds in the setting of refractory hypoglycemia  Q1h accuchecks  Continue D10  Avoid hypoglycemia  Once able to take po safely start diet

## 2025-03-16 NOTE — H&P
H&P - Critical Care/ICU   Name: Wes Araujo 55 y.o. male I MRN: [id number]  Unit/Bed#: ED-07 I Date of Admission: 3/16/2025   Date of Service: 3/16/2025 I Hospital Day: 0       Assessment & Plan  Hypoglycemia    Recent Labs     03/16/25  0059 03/16/25  0139 03/16/25  0225 03/16/25  0244 03/16/25  0308 03/16/25  0355 03/16/25  0427 03/16/25  0453   POCGLU 87 147* 126 100 69 37* 136 105       PTA: NPH insulin 5 units bid.  Per ER staff, family told EMS he has been taking 25 units bid  This evening was noted by family to be lethargic and glucose noted to be 16.  EMS recheck 26 provided Dextrose and f/u glucose 95.  He received additional dextrose in the ER and his glucose remains refractory.    Plan:  Continue D10 infusion and attempt to encourage oral intake once safe to take po  Holding NPH insulin  Avoid hypoglycemia  Check insulin antibody and insulin levels  Consider consult to endocrinology  Q1h blood glucoses  Primary hypertension  PTA: amlodipine, hydralazine    Plan:  Holding po meds until able to take po safely.   Start labetalol IV 10 mg q6h until able to take po  Type 2 diabetes mellitus with hypoglycemia, with long-term current use of insulin ([facility])  Lab Results   Component Value Date    HGBA1C 9.8 (H) 02/05/2025       Recent Labs     03/16/25  0308 03/16/25  0355 03/16/25  0427 03/16/25  0453   POCGLU 69 37* 136 105       Blood Sugar Average: Last 72 hrs:  (P) 100.875    PTA: NPH insulin 5 units bid.    Reportedly patient was taking 25 units bid    Plan:  Holding PTA meds in the setting of refractory hypoglycemia  Q1h accuchecks  Continue D10  Avoid hypoglycemia  Once able to take po safely start diet   Paroxysmal A-fib (HCC)  Currently in NSR  Not on anticoagulation or rate control as outpatient.    Plan:  Continue telemetry  Chronic pancreatitis (HCC)  PTA: Creon  In the setting of EtOH abuse    Plan:  Resume home creon once able to take po safely  Metabolic acidosis  Recent Labs     03/16/25  0120  03/16/25  0356   LACTICACID 6.5* 1.0     Likely in the setting of profound hypoglycemia.    It is possible that he had a seizure in the setting of hypoglycemia, though no seizure-like activity was reported.   Received 1L IVF in the ER  Lactic acid cleared, no further need to trend   Hypothermia  Noted 91F in ER.  Was started under forced warm air blanket and has improved to 94.5F  Likely in the setting of profound hypoglycemia    Plan:  Continue forced warm air blanket with goal normothermia  Continuous temperature monitoring  Close hemodynamic monitoring while active warming     CKD stage 3b, GFR 30-44 ml/min ([facility])  Lab Results   Component Value Date    EGFR 54 03/16/2025    EGFR 29 02/11/2025    EGFR 37 02/10/2025    CREATININE 1.43 (H) 03/16/2025    CREATININE 2.37 (H) 02/11/2025    CREATININE 1.95 (H) 02/10/2025     Baseline creatinine 1.3-1.7  Currently 1.43    Plan:  Monitor UO and renal indices  BMP in am   Avoid hypotension, nephrotoxins  Disposition: Stepdown Level 1    History of Present Illness   Wes Araujo is a 55 y.o. who presents to the ER this evening with hypoglycemia, lactic acidosis 6.5. Per ER staff, EMS reported that family stated he was taking NPH 25 units bid versus what is in the medical record of 5 units BID. He has a PMHx T2DM, Hypertension, alcohol abuse, chronic pancreatitis, CKD 3. Altered for family, they checked his blood sugar, was 16. EMS arrived, initial recheck at 26 g/dL. Received D50, recheck 95 g/dL. Per EMS, family was saying they had been administering him 25 units insulin per dose. Initial GCS in ER 7. Now GCS 11 (E3-V2-M6). Initial temp was 91F. Up to 94.5 Fwith forced warm air blanket. Got D10 bolus in the ER and the glucose trended down from 100 to 69 since.       History obtained from chart review and unobtainable from patient due to mental status.  Review of Systems: Review of Systems not obtainable due to Altered mental status    Historical Information   Past Medical  History:  No date: Alcohol abuse  No date: Chronic pancreatitis (HCC)  No date: Diabetes mellitus (HCC)      Comment:  Type 2  No date: Pancreatitis  No date: Paroxysmal A-fib (HCC)  No date: Thrombocytopenia (HCC) Past Surgical History:  8/16/2020: INCISION AND DRAINAGE OF WOUND; N/A      Comment:  Procedure: INCISION AND DRAINAGE (I&D) HEAD/FACE;                 Surgeon: Estrada Walton DMD;  Location: [facility];                 Service: Maxillofacial  2/7/2019: IR BIOPSY BONE MARROW  8/16/2020: REMOVAL OF IMPACTED TOOTH - COMPLETELY BONY; N/A      Comment:  Procedure: EXTRACTION TEETH MULTIPLE #2, 3;  Surgeon:                Estrada Walton DMD;  Location: [facility];  Service:                Maxillofacial   Current Outpatient Medications   Medication Instructions    Alcohol Swabs 70 % PADS May substitute brand based on insurance coverage. Check glucose TID.    amLODIPine (NORVASC) 5 mg, Oral, 2 times daily    Blood Glucose Monitoring Suppl (OneTouch Verio Reflect) w/Device KIT May substitute brand based on insurance coverage. Check glucose TID.    Continuous Blood Gluc Sensor (ProteopureStyle Pablo 3 Sensor) MISC 1 each, Does not apply, Every 14 days    folic acid ( FOLIC ACID) 1 mg, Oral, Daily    glucose blood (OneTouch Verio) test strip May substitute brand based on insurance coverage. Check glucose TID.    hydrALAZINE (APRESOLINE) 25 mg, Oral, Every 8 hours scheduled    insulin NPH-insulin regular (HumuLIN 70/30) 100 units/mL subcutaneous injection 5 Units, Subcutaneous, 2 times daily before meals    Insulin Pen Needle (BD Pen Needle Claudia 2nd Gen) 32G X 4 MM MISC For use with insulin pen. Pharmacy may dispense brand covered by insurance.    methocarbamol (ROBAXIN) 500 mg, Oral, Every 6 hours PRN    Multiple Vitamin (multivitamin) tablet 1 tablet, Oral, Daily    OneTouch Delica Lancets 33G MISC May substitute brand based on insurance coverage. Check glucose TID.    pancrelipase, Lip-Prot-Amyl, (CREON) 24,000 units  24,000 units of lipase, Oral, 3 times daily with meals    thiamine 50 mg, Oral, Daily    No Known Allergies   Social History     Tobacco Use    Smoking status: Former     Passive exposure: Past    Smokeless tobacco: Never   Vaping Use    Vaping status: Never Used   Substance Use Topics    Alcohol use: Yes    Drug use: Not Currently     Types: Cocaine    Family History   Problem Relation Age of Onset    Diabetes Mother     Hypertension Mother     Hyperlipidemia Father           Objective :                   Vitals I/O      Most Recent Min/Max in 24hrs   Temp (!) 96.6 °F (35.9 °C) Temp  Min: 91 °F (32.8 °C)  Max: 96.6 °F (35.9 °C)   Pulse 79 Pulse  Min: 75  Max: 92   Resp 14 Resp  Min: 12  Max: 26   /86 BP  Min: 150/80  Max: 191/88   O2 Sat 99 % SpO2  Min: 96 %  Max: 100 %      Intake/Output Summary (Last 24 hours) at 3/16/2025 9366  Last data filed at 3/16/2025 0584  Gross per 24 hour   Intake 1500 ml   Output 1550 ml   Net -50 ml       Diet NPO    Invasive Monitoring           Physical Exam   Physical Exam  Vitals and nursing note reviewed.   Eyes:      General: Vision grossly intact. NeglectNo scleral icterus.        Right eye: No discharge.         Left eye: No discharge.      Extraocular Movements: Extraocular movements intact.      Conjunctiva/sclera: Conjunctivae normal.      Pupils: Pupils are equal, round, and reactive to light.   Skin:     General: Skin is warm, dry and not mottled extremities.      Capillary Refill: Capillary refill takes less than 2 seconds.      Coloration: Skin is not jaundiced or pale.      Findings: No lesion, rash or wound.   HENT:      Head: Normocephalic and atraumatic.      Right Ear: Hearing normal. No drainage.      Left Ear: Hearing normal. No drainage.      Nose: No nasal deformity, nasal tenderness, congestion, rhinorrhea, epistaxis or nasogastric tube.      Mouth/Throat:      Mouth: Mucous membranes are moist. No injury or angioedema.      Dentition: Normal dentition.       Pharynx: No oropharyngeal exudate.   Neck:      Vascular: No JVD.   no midline tenderness Cardiovascular:      Rate and Rhythm: Normal rate and regular rhythm.      Pulses: No decreased pulses.      Heart sounds: No murmur heard.     No friction rub. No gallop.   Musculoskeletal:         General: No swelling, tenderness, deformity or signs of injury. Normal range of motion.      Right lower leg: No edema.      Left lower leg: No edema.   Abdominal: General: Bowel sounds are normal. There is no distension.      Palpations: Abdomen is soft.      Tenderness: There is no abdominal tenderness.   Constitutional:       General: He is not in acute distress.     Appearance: He is well-developed and well-nourished. He is ill-appearing. He is not toxic-appearing.      Interventions: He is not sedated, not intubated and not restrained.  Pulmonary:      Effort: Pulmonary effort is normal. No accessory muscle usage, respiratory distress or accessory muscle usage. He is not intubated.      Breath sounds: Normal breath sounds. No wheezing, rhonchi or rales.   Psychiatric:         Speech: Speech is not no receptive aphasia or no expressive aphasia.   Neurological:      GCS: GCS eye subscore is 3. GCS verbal subscore is 2. GCS motor subscore is 6.      Cranial Nerves: No facial asymmetry.      Sensory: Sensation is intact.      Motor: Strength full and intact in all extremities. No pronator drift or motor deficit.      Comments: Patient awakens easily but is mumbling responses.  He follows commands and is purposeful in extremities x4.    Genitourinary/Anorectal:  Silva present.        Diagnostic Studies        Lab Results: I have reviewed the following results:     Medications:  Scheduled PRN   [Held by provider] amLODIPine, 5 mg, BID  chlorhexidine, 15 mL, Q12H JAISON  [Held by provider] folic acid, 1 mg, Daily  heparin (porcine), 5,000 Units, Q8H JAISON  labetalol, 10 mg, Q6H  [Held by provider] pancrelipase (Lip-Prot-Amyl), 24,000  Units, TID With Meals  [Held by provider] thiamine, 50 mg, Daily          Continuous    dextrose 10 % and normal saline infusion, 75 mL/hr, Last Rate: 75 mL/hr (03/16/25 0320)         Labs:   CBC    Recent Labs     03/16/25  0120   WBC 12.80*   HGB 9.4*   HCT 28.7*        BMP    Recent Labs     03/16/25  0128   SODIUM 139   K 3.8   *   CO2 19*   AGAP 9   BUN 15   CREATININE 1.43*   CALCIUM 8.2*       Coags    Recent Labs     03/16/25  0128   INR 1.11   PTT 24        Additional Electrolytes  Recent Labs     03/16/25  0128   MG 1.8*          Blood Gas    No recent results  Recent Labs     03/16/25  0319   PHVEN 7.273*   UIW2KIE 45.4   PO2VEN 29.5*   RKL1MAP 20.5*   BEVEN -6.0   D4EKIVK 56.0*    LFTs  Recent Labs     03/16/25 0128   ALT 61*   AST 46*   ALKPHOS 123*   ALB 3.3*   TBILI 0.39       Infectious  Recent Labs     03/16/25  0120   PROCALCITONI 0.14     Glucose  Recent Labs     03/16/25  0128   GLUC 158*        Administrative Statements   I have spent a total time of 40 minutes in caring for this patient on the day of the visit/encounter including Diagnostic results, Impressions, Documenting in the medical record, Reviewing/placing orders in the medical record (including tests, medications, and/or procedures), Obtaining or reviewing history  , and Communicating with other healthcare professionals .

## 2025-03-16 NOTE — ASSESSMENT & PLAN NOTE
Currently in NSR  Not on anticoagulation or rate control as outpatient.    Plan:  Continue telemetry

## 2025-03-16 NOTE — PLAN OF CARE
Problem: Potential for Falls  Goal: Patient will remain free of falls  Description: INTERVENTIONS:  - Educate patient/family on patient safety including physical limitations  - Instruct patient to call for assistance with activity   - Consult OT/PT to assist with strengthening/mobility   - Keep Call bell within reach  - Keep bed low and locked with side rails adjusted as appropriate  - Keep care items and personal belongings within reach  - Initiate and maintain comfort rounds  - Make Fall Risk Sign visible to staff  - Offer Toileting every 2 Hours, in advance of need  - Initiate/Maintain bed alarm  - Obtain necessary fall risk management equipment: alarm   - Apply yellow socks and bracelet for high fall risk patients  - Consider moving patient to room near nurses station  Outcome: Progressing

## 2025-03-16 NOTE — ED PROVIDER NOTES
Time reflects when diagnosis was documented in both MDM as applicable and the Disposition within this note       Time User Action Codes Description Comment    3/16/2025  2:11 AM Celeste Mendez Add [G93.41] Acute metabolic encephalopathy     3/16/2025  3:48 AM Florin Chawla Add [Z00.00] Annual physical exam     3/16/2025  3:48 AM KatorstenzingFlorin goyal Remove [Z00.00] Annual physical exam     3/16/2025  3:50 AM Florin Chawla Add [E16.2] Hypoglycemia     3/16/2025  3:50 AM GeovannyzingFlorin goyal Add [T68.XXXA] Hypothermia, initial encounter     3/16/2025  3:50 AM GeovannyzingFlorin goyal Add [E87.20] Lactic acidosis     3/16/2025  3:50 AM KatorstenzingFlorin goyal Add [E87.20] Metabolic acidosis     3/16/2025  4:51 AM Florin Chawla Add [E83.42] Hypomagnesemia     3/16/2025  4:52 AM Florin Chawla Add [R65.10] SIRS (systemic inflammatory response syndrome) (HCC)           ED Disposition       ED Disposition   Admit    Condition   Stable    Date/Time   Sun Mar 16, 2025  3:50 AM    Comment   Case was discussed with critical care and the patient's admission status was agreed to be Admission Status: inpatient status to the service of Dr. Robledo .               Assessment & Plan       Medical Decision Making  Patient is a 55 y.o. male with PMH of DM 2 on prescribed 5 units NPH, Hypertension, alcohol abuse, chronic pancreatitis, CKD 3 who presents to the ED with altered mental status, hypoglycemia.    Vital signs hypothermic, otherwise stable. On exam cold to the touch, dry mucous membranes, rigors.     Differential diagnosis included but not limited to hypoglycemia, electrolyte abnormality, at risk for sepsis given hypothermia, heart rate above 90.  No clear source of infection.    Plan: CBC, CMP, lactic, ammonia, ethanol level, salicylate level, acetaminophen level, flu COVID RSV, procalcitonin, blood culture x 2, PT/INR, APTT, magnesium level, CK, VBG.  UA with insertion of  "temperature-sensing Silva catheter.  Given concern for patient downtrending blood glucose level, will initiate D10 normal saline at 75cc/h.  Chest x-ray, CT head.  Patient placed on Shannon hugger, warm blankets.    View ED course above for further discussion on patient workup.     On review of previous records reviewed previous medical records, patient recently discharged from the hospital on 5 units NPH insulin twice daily.    All labs reviewed and utilized in the medical decision making process  All radiology studies independently viewed by me and interpreted by the radiologist.  I reviewed all testing with the patient.     Upon re-evaluation over the course of evaluation, patient's mental status improved, to GCS 11 (E3,V2,M6).  Laboratory workup demonstrates uncompensated metabolic acidosis with pH 7.115, bicarb 15, lactic acidosis at 6.5.  Hypomagnesemic at 1.8, will give 4 g magnesium.  Patient blood glucose climbed to 157 after initiation of D10 drip, turned off D10 drip and gave 1 L LR.  Patient's blood glucose started to drop, reinitiation D10 drip at 75 cc/h once patient's glucose reaches 70.  Patient's BGL continued to drop, gave 1 amp of D50.  Patient admitted to critical care    Disposition: Admit to critical care    Portions of the record may have been created with voice recognition software. Occasional wrong word or \"sound a like\" substitutions may have occurred due to the inherent limitations of voice recognition software. Read the chart carefully and recognize, using context, where substitutions have occurred.      Amount and/or Complexity of Data Reviewed  Labs: ordered. Decision-making details documented in ED Course.  Radiology: ordered and independent interpretation performed. Decision-making details documented in ED Course.  ECG/medicine tests:  Decision-making details documented in ED Course.    Risk  Prescription drug management.  Decision regarding hospitalization.        ED Course as of " 03/16/25 0500   Sun Mar 16, 2025   0120 Temperature(!): 91 °F (32.8 °C)  Patient placed on Bear hugger   0126 POC Glucose: 87   0128 ECG 12 lead  EKG showing normal sinus rhythm at 94 bpm.  Normal axis.  Normal IL, QRS normals.  Prolonged QTc.  Nonspecific ST-T wave abnormalities.  Significant artifact as patient is currently shivering.  QTc is slightly more prolonged from previous EKG February 2025   0144 WBC(!): 12.80   0144 Hemoglobin(!): 9.4   0144 POC Glucose(!): 147  Will stop dextrose gtt.   0149 Blood gas, Venous(!!)  Metabolic acidosis, uncompensated   0151 pH, Sami(!!): 7.115   0151 HCO3, Sami(!): 15.0   0207 LACTIC ACID(!!): 6.5   0207 MAGNESIUM(!): 1.8   0210 Creatinine(!): 1.43  Improved from baseline   0211 AST(!): 46   0211 ALT(!): 61   0211 Ammonia: 34   0223 Total CK: 61   0226 POC Glucose: 126   0226 Temperature(!): 92.3 °F (33.5 °C)   0304 UA w Reflex to Microscopic w Reflex to Culture(!)  No leukocytes, no nitrites, no bacteria   0304 POC Glucose: 100   0309 POC Glucose: 69  Will restart D10 normal saline infusion at 75 mL/h     0327 pH, Sami(!): 7.273   0327 HCO3, Sami(!): 20.5   0329 CT head without contrast  No acute intracranial abnormality.       Medications   dextrose 10 % and normal saline infusion (75 mL/hr Intravenous New Bag 3/16/25 0320)   labetalol (NORMODYNE) injection 10 mg (has no administration in time range)   glucagon (FOR EMS ONLY) (GLUCAGEN) injection 1 mg (0 mg Does not apply Given to EMS 3/16/25 0105)   lactated ringers bolus 1,000 mL (0 mL Intravenous Stopped 3/16/25 0315)   magnesium sulfate 4 g/100 mL IVPB (premix) 4 g (0 g Intravenous Stopped 3/16/25 0440)   dextrose 50 % IV solution 50 mL (50 mL Intravenous Given 3/16/25 0400)       ED Risk Strat Scores                                                History of Present Illness       Chief Complaint   Patient presents with    Hypoglycemia - Symptomatic     BG 26 when found by EMS - given 1g glucagon mos recent BG 96        Past Medical History:   Diagnosis Date    Alcohol abuse     Chronic pancreatitis (HCC)     Diabetes mellitus (HCC)     Type 2    Pancreatitis     Paroxysmal A-fib (HCC)     Thrombocytopenia (HCC)       Past Surgical History:   Procedure Laterality Date    INCISION AND DRAINAGE OF WOUND N/A 8/16/2020    Procedure: INCISION AND DRAINAGE (I&D) HEAD/FACE;  Surgeon: Estrada Walton DMD;  Location: [facility];  Service: Maxillofacial    IR BIOPSY BONE MARROW  2/7/2019    REMOVAL OF IMPACTED TOOTH - COMPLETELY BONY N/A 8/16/2020    Procedure: EXTRACTION TEETH MULTIPLE #2, 3;  Surgeon: Estrada Walton DMD;  Location: [facility];  Service: Maxillofacial      Family History   Problem Relation Age of Onset    Diabetes Mother     Hypertension Mother     Hyperlipidemia Father       Social History     Tobacco Use    Smoking status: Former     Passive exposure: Past    Smokeless tobacco: Never   Vaping Use    Vaping status: Never Used   Substance Use Topics    Alcohol use: Yes    Drug use: Not Currently     Types: Cocaine      E-Cigarette/Vaping    E-Cigarette Use Never User       E-Cigarette/Vaping Substances    Nicotine No     THC No     CBD No     Flavoring No     Other No     Unknown No       I have reviewed and agree with the history as documented.     55-year-old male history of DM 2 on prescribed 5 units NPH, Hypertension, alcohol abuse, chronic pancreatitis, CKD 3 came in with AMS, hypoglycemia. Altered for family, they checked his blood sugar, was 16. EMS arrived, initial recheck at 26. Received 1 ampoule D50, recheck BGL at 95. Per EMS, family was saying they had been administering him 25 units insulin per dose. No family has arrived in ED to verify this, was unable to get them on the phone. Initial GCS 7 (E2,V1,M4). initial BGL here 85 in ED. Unable to obtain further history from patient.          Review of Systems   Unable to perform ROS: Mental status change   Psychiatric/Behavioral:  Positive for confusion.             Objective       ED Triage Vitals   Temperature Pulse Blood Pressure Respirations SpO2 Patient Position - Orthostatic VS   03/16/25 0117 03/16/25 0058 03/16/25 0100 03/16/25 0100 03/16/25 0100 --   (!) 91 °F (32.8 °C) 91 150/80 16 100 %       Temp Source Heart Rate Source BP Location FiO2 (%) Pain Score    03/16/25 0117 03/16/25 0245 -- -- --    Rectal Monitor         Vitals      Date and Time Temp Pulse SpO2 Resp BP Pain Score FACES Pain Rating User   03/16/25 0430 96.4 °F (35.8 °C) 81 97 % 13 174/82 -- -- EP   03/16/25 0345 95.2 °F (35.1 °C) 80 96 % 15 171/79 -- -- EP   03/16/25 0330 94.8 °F (34.9 °C) 79 97 % 12 162/79 -- -- EP   03/16/25 0315 94.5 °F (34.7 °C) 79 97 % 12 189/90 -- -- EP   03/16/25 0300 94.1 °F (34.5 °C) 77 99 % 23 179/88 -- -- EP   03/16/25 0245 93.9 °F (34.4 °C) 76 99 % -- 189/84 -- -- AS   03/16/25 0230 93.6 °F (34.2 °C) 75 100 % -- 171/86 -- -- AS   03/16/25 0227 93.6 °F (34.2 °C) -- -- -- -- -- -- AS   03/16/25 0215 92.3 °F (33.5 °C) 80 100 % -- 191/88 -- -- AS   03/16/25 0200 -- 79 100 % 26 163/88 -- -- AS   03/16/25 0145 -- 84 100 % 20 170/88 -- -- AS   03/16/25 0130 -- 87 -- 18 190/93 -- -- AS   03/16/25 0117 91 °F (32.8 °C) -- -- -- -- -- -- AS   03/16/25 0100 -- 92 100 % 16 150/80 -- -- AS   03/16/25 0058 -- 91 -- -- -- -- -- AS            Physical Exam  Vitals and nursing note reviewed.   Constitutional:       General: He is in acute distress.      Appearance: He is ill-appearing.      Comments: Patient cold to the touch with rigors   HENT:      Head: Normocephalic and atraumatic.      Mouth/Throat:      Mouth: Mucous membranes are dry.   Eyes:      General: No scleral icterus.     Conjunctiva/sclera: Conjunctivae normal.   Cardiovascular:      Rate and Rhythm: Normal rate and regular rhythm.      Pulses:           Carotid pulses are 2+ on the right side and 2+ on the left side.       Dorsalis pedis pulses are 2+ on the right side and 2+ on the left side.      Heart sounds: No  murmur heard.     No friction rub. No gallop.   Pulmonary:      Effort: Pulmonary effort is normal. No tachypnea, bradypnea or respiratory distress.      Breath sounds: No wheezing, rhonchi or rales.   Abdominal:      General: There is no distension.      Palpations: Abdomen is soft.      Tenderness: There is no abdominal tenderness. There is no right CVA tenderness, left CVA tenderness, guarding or rebound.   Musculoskeletal:         General: Normal range of motion.   Skin:     General: Skin is dry.      Capillary Refill: Capillary refill takes less than 2 seconds.      Coloration: Skin is pale.      Findings: No rash.      Comments: No signs of trauma   Neurological:      General: No focal deficit present.      Mental Status: He is alert.   Psychiatric:         Mood and Affect: Mood normal.         Behavior: Behavior normal.         Results Reviewed       Procedure Component Value Units Date/Time    Fingerstick Glucose (POCT) [[id number]]  (Normal) Collected: 03/16/25 0453    Lab Status: Final result Specimen: Blood Updated: 03/16/25 0454     POC Glucose 105 mg/dl     CBC and differential [[id number]]     Lab Status: No result Specimen: Blood     Basic metabolic panel [[id number]]     Lab Status: No result Specimen: Blood     Magnesium [[id number]]     Lab Status: No result Specimen: Blood     Phosphorus [[id number]]     Lab Status: No result Specimen: Blood     Calcium, ionized [[id number]]     Lab Status: No result Specimen: Blood     Insulin antibody [[id number]]     Lab Status: No result Specimen: Blood     Insulin, random [[id number]]     Lab Status: No result Specimen: Blood     Fingerstick Glucose (POCT) [[id number]]  (Normal) Collected: 03/16/25 0427    Lab Status: Final result Specimen: Blood Updated: 03/16/25 0427     POC Glucose 136 mg/dl     Lactic acid 2 Hours [[id number]]  (Normal) Collected: 03/16/25 0356    Lab Status: Final result Specimen: Blood from Arm, Right Updated: 03/16/25 0420     LACTIC ACID 1.0  mmol/L     Narrative:      Result may be elevated if tourniquet was used during collection.    Fingerstick Glucose (POCT) [[id number]]  (Abnormal) Collected: 03/16/25 0355    Lab Status: Final result Specimen: Blood Updated: 03/16/25 0358     POC Glucose 37 mg/dl     Blood gas, venous [[id number]]  (Abnormal) Collected: 03/16/25 0319    Lab Status: Final result Specimen: Blood from Arm, Right Updated: 03/16/25 0325     pH, Sami 7.273     pCO2, Sami 45.4 mm Hg      pO2, Sami 29.5 mm Hg      HCO3, Sami 20.5 mmol/L      Base Excess, Sami -6.0 mmol/L      O2 Content, Sami 6.7 ml/dL      O2 HGB, VENOUS 56.0 %     Fingerstick Glucose (POCT) [[id number]]  (Normal) Collected: 03/16/25 0308    Lab Status: Final result Specimen: Blood Updated: 03/16/25 0309     POC Glucose 69 mg/dl     Urine Microscopic [[id number]]  (Normal) Collected: 03/16/25 0212    Lab Status: Final result Specimen: Urine, Straight Cath Updated: 03/16/25 0247     RBC, UA 1-2 /hpf      WBC, UA 1-2 /hpf      Epithelial Cells Occasional /hpf      Bacteria, UA None Seen /hpf     Fingerstick Glucose (POCT) [[id number]]  (Normal) Collected: 03/16/25 0244    Lab Status: Final result Specimen: Blood Updated: 03/16/25 0245     POC Glucose 100 mg/dl     UA w Reflex to Microscopic w Reflex to Culture [[id number]]  (Abnormal) Collected: 03/16/25 0212    Lab Status: Final result Specimen: Urine, Straight Cath Updated: 03/16/25 0229     Color, UA Colorless     Clarity, UA Clear     Specific Gravity, UA 1.008     pH, UA 5.5     Leukocytes, UA Negative     Nitrite, UA Negative     Protein,  (2+) mg/dl      Glucose, UA Negative mg/dl      Ketones, UA Negative mg/dl      Urobilinogen, UA <2.0 mg/dl      Bilirubin, UA Negative     Occult Blood, UA Trace    Fingerstick Glucose (POCT) [[id number]]  (Normal) Collected: 03/16/25 0225    Lab Status: Final result Specimen: Blood Updated: 03/16/25 0225     POC Glucose 126 mg/dl     FLU/RSV/COVID - if FLU/RSV clinically relevant  [[id number]]  (Normal) Collected: 03/16/25 0118    Lab Status: Final result Specimen: Nares from Nose Updated: 03/16/25 0224     SARS-CoV-2 Negative     INFLUENZA A PCR Negative     INFLUENZA B PCR Negative     RSV PCR Negative    Narrative:      This test has been performed using the CoV-2/Flu/RSV plus assay on the [employer] GeneXpert platform. This test has been validated by the  and verified by the performing laboratory.     This test is designed to amplify and detect the following: nucleocapsid (N), envelope (E), and RNA-dependent RNA polymerase (RdRP) genes of the SARS-CoV-2 genome; matrix (M), basic polymerase (PB2), and acidic protein (PA) segments of the influenza A genome; matrix (M) and non-structural protein (NS) segments of the influenza B genome, and the nucleocapsid genes of RSV A and RSV B.     Positive results are indicative of the presence of Flu A, Flu B, RSV, and/or SARS-CoV-2 RNA. Positive results for SARS-CoV-2 or suspected novel influenza should be reported to state, local, or federal health departments according to local reporting requirements.      All results should be assessed in conjunction with clinical presentation and other laboratory markers for clinical management.     FOR PEDIATRIC PATIENTS - copy/paste COVID Guidelines URL to browser: [email or web address]       CK [[id number]]  (Normal) Collected: 03/16/25 0128    Lab Status: Final result Specimen: Blood from Arm, Right Updated: 03/16/25 0218     Total CK 61 U/L     Procalcitonin [[id number]]  (Normal) Collected: 03/16/25 0120    Lab Status: Final result Specimen: Blood from Arm, Right Updated: 03/16/25 0210     Procalcitonin 0.14 ng/ml     Lactic acid [[id number]]  (Abnormal) Collected: 03/16/25 0120    Lab Status: Final result Specimen: Blood from Arm, Right Updated: 03/16/25 0206     LACTIC ACID 6.5 mmol/L     Narrative:      Result may be elevated if tourniquet was used  during collection.    Ammonia [[id number]]  (Normal) Collected: 03/16/25 0120    Lab Status: Final result Specimen: Blood from Arm, Right Updated: 03/16/25 0203     Ammonia 34 umol/L     Ethanol [[id number]]  (Normal) Collected: 03/16/25 0120    Lab Status: Final result Specimen: Blood from Arm, Right Updated: 03/16/25 0203     Ethanol Lvl <10 mg/dL     Comprehensive metabolic panel [[id number]]  (Abnormal) Collected: 03/16/25 0128    Lab Status: Final result Specimen: Blood from Arm, Right Updated: 03/16/25 0201     Sodium 139 mmol/L      Potassium 3.8 mmol/L      Chloride 111 mmol/L      CO2 19 mmol/L      ANION GAP 9 mmol/L      BUN 15 mg/dL      Creatinine 1.43 mg/dL      Glucose 158 mg/dL      Calcium 8.2 mg/dL      Corrected Calcium 8.8 mg/dL      AST 46 U/L      ALT 61 U/L      Alkaline Phosphatase 123 U/L      Total Protein 5.8 g/dL      Albumin 3.3 g/dL      Total Bilirubin 0.39 mg/dL      eGFR 54 ml/min/1.73sq m     Narrative:      [employer] Kid[employer] guidelines for Chronic Kidney Disease (CKD):     Stage 1 with normal or high GFR (GFR > 90 mL/min/1.73 square meters)    Stage 2 Mild CKD (GFR = 60-89 mL/min/1.73 square meters)    Stage 3A Moderate CKD (GFR = 45-59 mL/min/1.73 square meters)    Stage 3B Moderate CKD (GFR = 30-44 mL/min/1.73 square meters)    Stage 4 Severe CKD (GFR = 15-29 mL/min/1.73 square meters)    Stage 5 End Stage CKD (GFR <15 mL/min/1.73 square meters)  Note: GFR calculation is accurate only with a steady state creatinine    Salicylate level [[id number]]  (Normal) Collected: 03/16/25 0128    Lab Status: Final result Specimen: Blood from Arm, Right Updated: 03/16/25 0201     Salicylate Lvl <5 mg/dL     Acetaminophen level-If concentration is detectable, please discuss with medical  on call. [[id number]]  (Abnormal) Collected: 03/16/25 0128    Lab Status: Final result Specimen: Blood from Arm, Right Updated: 03/16/25 0201     Acetaminophen Level <2 ug/mL      Magnesium [[id number]]  (Abnormal) Collected: 03/16/25 0128    Lab Status: Final result Specimen: Blood from Arm, Right Updated: 03/16/25 0201     Magnesium 1.8 mg/dL     Protime-INR [[id number]]  (Normal) Collected: 03/16/25 0128    Lab Status: Final result Specimen: Blood from Arm, Right Updated: 03/16/25 0151     Protime 14.5 seconds      INR 1.11    Narrative:      INR Therapeutic Range    Indication                                             INR Range      Atrial Fibrillation                                               2.0-3.0  Hypercoagulable State                                    2.0.2.3  Left Ventricular Asist Device                            2.0-3.0  Mechanical Heart Valve                                  -    Aortic(with afib, MI, embolism, HF, LA enlargement,    and/or coagulopathy)                                     2.0-3.0 (2.5-3.5)     Mitral                                                             2.5-3.5  Prosthetic/Bioprosthetic Heart Valve               2.0-3.0  Venous thromboembolism (VTE: VT, PE        2.0-3.0    APTT [[id number]]  (Normal) Collected: 03/16/25 0128    Lab Status: Final result Specimen: Blood from Arm, Right Updated: 03/16/25 0151     PTT 24 seconds     Blood gas, Venous [[id number]]  (Abnormal) Collected: 03/16/25 0120    Lab Status: Final result Specimen: Blood from Arm, Right Updated: 03/16/25 0145     pH, Sami 7.115     pCO2, Sami 47.8 mm Hg      pO2, Sami 45.8 mm Hg      HCO3, Sami 15.0 mmol/L      Base Excess, Sami -13.9 mmol/L      O2 Content, Sami 11.1 ml/dL      O2 HGB, VENOUS 72.1 %     CBC and differential [[id number]]  (Abnormal) Collected: 03/16/25 0120    Lab Status: Final result Specimen: Blood from Arm, Right Updated: 03/16/25 0142     WBC 12.80 Thousand/uL      RBC 3.11 Million/uL      Hemoglobin 9.4 g/dL      Hematocrit 28.7 %      MCV 92 fL      MCH 30.2 pg      MCHC 32.8 g/dL      RDW 12.0 %      MPV 10.7 fL      Platelets 247 Thousands/uL      nRBC 0 /100 WBCs       Segmented % 75 %      Immature Grans % 1 %      Lymphocytes % 17 %      Monocytes % 5 %      Eosinophils Relative 1 %      Basophils Relative 1 %      Absolute Neutrophils 9.83 Thousands/µL      Absolute Immature Grans 0.12 Thousand/uL      Absolute Lymphocytes 2.15 Thousands/µL      Absolute Monocytes 0.58 Thousand/µL      Eosinophils Absolute 0.06 Thousand/µL      Basophils Absolute 0.06 Thousands/µL     Fingerstick Glucose (POCT) [[id number]]  (Abnormal) Collected: 03/16/25 0139    Lab Status: Final result Specimen: Blood Updated: 03/16/25 0140     POC Glucose 147 mg/dl     Blood culture #2 [[id number]] Collected: 03/16/25 0128    Lab Status: In process Specimen: Blood from Arm, Right Updated: 03/16/25 0138    Blood culture #1 [[id number]] Collected: 03/16/25 0127    Lab Status: In process Specimen: Blood from Arm, Right Updated: 03/16/25 0138    Fingerstick Glucose (POCT) [[id number]]  (Normal) Collected: 03/16/25 0059    Lab Status: Final result Specimen: Blood Updated: 03/16/25 0100     POC Glucose 87 mg/dl             XR chest 1 view portable   ED Interpretation by Florin Chawla DO (03/16 0209)   No pneumothorax, no effusion, no infiltrate      CT head without contrast   Final Interpretation by Joshua Loredo DO (03/16 0326)      No acute intracranial abnormality.                  Workstation performed: [id number]             Procedures    ED Medication and Procedure Management   Prior to Admission Medications   Prescriptions Last Dose Informant Patient Reported? Taking?   Alcohol Swabs 70 % PADS   No No   Sig: May substitute brand based on insurance coverage. Check glucose TID.   Blood Glucose Monitoring Suppl (OneTouch Verio Reflect) w/Device KIT   No No   Sig: May substitute brand based on insurance coverage. Check glucose TID.   Continuous Blood Gluc Sensor (FreeStyle Pablo 3 Sensor) MISC   No No   Sig: Use 1 each every 14 (fourteen) days   Insulin Pen Needle (BD Pen Needle Claudia 2nd Gen) 32G X 4 MM  MISC   No No   Sig: For use with insulin pen. Pharmacy may dispense brand covered by insurance.   Multiple Vitamin (multivitamin) tablet   No No   Sig: Take 1 tablet by mouth daily   OneTouch Delica Lancets 33G MISC   No No   Sig: May substitute brand based on insurance coverage. Check glucose TID.   amLODIPine (NORVASC) 5 mg tablet   No No   Sig: Take 1 tablet (5 mg total) by mouth 2 (two) times a day   folic acid ( Folic Acid) 1 mg tablet   No No   Sig: Take 1 tablet (1 mg total) by mouth daily   glucose blood (OneTouch Verio) test strip   No No   Sig: May substitute brand based on insurance coverage. Check glucose TID.   hydrALAZINE (APRESOLINE) 25 mg tablet   No No   Sig: Take 1 tablet (25 mg total) by mouth every 8 (eight) hours   insulin NPH-insulin regular (HumuLIN 70/30) 100 units/mL subcutaneous injection   No No   Sig: Inject 5 Units under the skin 2 (two) times a day before meals   methocarbamol (ROBAXIN) 500 mg tablet   No No   Sig: Take 1 tablet (500 mg total) by mouth every 6 (six) hours as needed for muscle spasms   pancrelipase, Lip-Prot-Amyl, (CREON) 24,000 units   No No   Sig: Take 24,000 units of lipase by mouth 3 (three) times a day with meals   thiamine 50 MG tablet   No No   Sig: Take 1 tablet (50 mg total) by mouth daily      Facility-Administered Medications: None     Patient's Medications   Discharge Prescriptions    No medications on file     No discharge procedures on file.  ED SEPSIS DOCUMENTATION   Time reflects when diagnosis was documented in both MDM as applicable and the Disposition within this note       Time User Action Codes Description Comment    3/16/2025  2:11 AM Celeste Mendez Add [G93.41] Acute metabolic encephalopathy     3/16/2025  3:48 AM Florin Chawla Add [Z00.00] Annual physical exam     3/16/2025  3:48 AM Florin Chawla Remove [Z00.00] Annual physical exam     3/16/2025  3:50 AM Florin Chawla [E16.2] Hypoglycemia     3/16/2025  3:50 AM  Florin Chawla [T68.XXXA] Hypothermia, initial encounter     3/16/2025  3:50 AM Florin Chawla [E87.20] Lactic acidosis     3/16/2025  3:50 AM Florin Chawla [E87.20] Metabolic acidosis     3/16/2025  4:51 AM Florin Chawla [E83.42] Hypomagnesemia     3/16/2025  4:52 AM Florin Chawla [R65.10] SIRS (systemic inflammatory response syndrome) (HCC)            Initial Sepsis Screening       Row Name 03/16/25 0152                Is the patient's history suggestive of a new or worsening infection? No  -AK        Suspected source of infection --  -AK        Indicate SIRS criteria --  -AK        Are two or more of the above signs & symptoms of infection both present and new to the patient? --  -AK        Assess for evidence of organ dysfunction: Are any of the below criteria present within 6 hours of suspected infection and SIRS criteria that are NOT considered to be chronic conditions? --                  User Key  (r) = Recorded By, (t) = Taken By, (c) = Cosigned By      Initials Name Provider Type    AK Florin Chawla DO Resident                       Florin Chawla DO  03/16/25 [id number]

## 2025-03-16 NOTE — ASSESSMENT & PLAN NOTE
PTA: amlodipine, hydralazine    Plan:  Holding po meds until able to take po safely.   Start labetalol IV 10 mg q6h until able to take po

## 2025-03-16 NOTE — ASSESSMENT & PLAN NOTE
Noted 91F in ER.  Was started under forced warm air blanket and has improved to 94.5F  Likely in the setting of profound hypoglycemia    Plan:  Continue forced warm air blanket with goal normothermia  Continuous temperature monitoring  Close hemodynamic monitoring while active warming

## 2025-03-16 NOTE — ASSESSMENT & PLAN NOTE
Recent Labs     03/16/25  0059 03/16/25  0139 03/16/25  0225 03/16/25  0244 03/16/25  0308 03/16/25  0355 03/16/25  0427 03/16/25  0453   POCGLU 87 147* 126 100 69 37* 136 105       PTA: NPH insulin 5 units bid.  Per ER staff, family told EMS he has been taking 25 units bid  This evening was noted by family to be lethargic and glucose noted to be 16.  EMS recheck 26 provided Dextrose and f/u glucose 95.  He received additional dextrose in the ER and his glucose remains refractory.    Plan:  Continue D10 infusion and attempt to encourage oral intake once safe to take po  Holding NPH insulin  Avoid hypoglycemia  Check insulin antibody and insulin levels  Consider consult to endocrinology  Q1h blood glucoses

## 2025-03-16 NOTE — ED ATTENDING ATTESTATION
3/16/2025  IDarcy DO, saw and evaluated the patient. I have discussed the patient with the resident/non-physician practitioner and agree with the resident's/non-physician practitioner's findings, Plan of Care, and MDM as documented in the resident's/non-physician practitioner's note, except where noted. All available labs and Radiology studies were reviewed.  I was present for key portions of any procedure(s) performed by the resident/non-physician practitioner and I was immediately available to provide assistance.       At this point I agree with the current assessment done in the Emergency Department.  I have conducted an independent evaluation of this patient a history and physical is as follows:        A 54 yo male with pmhx of DM, Afib, HTN and CKD; BIBA for altered mental status and hypoglycemia.  Per EMS, pt was noted to have a glucose of 26 improved following a bolus of D10.  On arrival to the ED, pt is awake however disoriented.  He does not answer questions or follow commands appropriately.  Complete history and ROS are unobtainable from the patient due to his AMS.    Physical Exam  General Appearance: awake, disoriented.  Ill appearing.  Skin:  Cool and diaphoretic, intact  HEENT: atraumatic, normocephalic  Neck: Supple, trachea midline  Cardiac: RRR; no murmurs, rub, gallops  Pulmonary: Bilateral rhonchi.  No rales or wheezing.  Gastrointestinal: abdomen soft, nontender, nondistended; no guarding or rebound tenderness; good bowel sounds, no mass or bruits  Extremities:  no pedal edema, 2+ pulses; no calf tenderness, no clubbing, no cyanosis  Neuro: Spontaneous movement noted to the bilateral upper and lower extremities  Psych:  unable to assess    Assessment and Plan:  Altered mental status, also with hypoglycemia prehospital which is now improved.  He is hypothermic.  Will proceed with labs to evaluate for electrolyte abnormality, ZENAIDA, anemia, infection and toxic ingestions.  Will check CXR  for PNA and CTH for intracranial pathology.  Will start warm IVF and Shannon Hugger.       ED Course         Critical Care Time  Procedures  Critical Care Time Statement: Upon my evaluation, this patient had a high probability of imminent or life-threatening deterioration due to acute encephalopathy, hypoglycemia, hypothermia, which required my direct attention, intervention, and personal management.  I spent a total of 30 minutes directly providing critical care services, including  Shannon Hugger, dextrose infusion . This time is exclusive of procedures, teaching, treating other patients, family meetings, and any prior time recorded by providers other than myself.

## 2025-03-17 DIAGNOSIS — Z78.9 NEEDS ASSISTANCE WITH COMMUNITY RESOURCES: Primary | ICD-10-CM

## 2025-03-17 LAB
ALBUMIN SERPL BCG-MCNC: 2.6 G/DL (ref 3.5–5)
ALP SERPL-CCNC: 104 U/L (ref 34–104)
ALT SERPL W P-5'-P-CCNC: 51 U/L (ref 7–52)
ANION GAP SERPL CALCULATED.3IONS-SCNC: 4 MMOL/L (ref 4–13)
AST SERPL W P-5'-P-CCNC: 41 U/L (ref 13–39)
BASOPHILS # BLD AUTO: 0.03 THOUSANDS/ÂΜL (ref 0–0.1)
BASOPHILS NFR BLD AUTO: 0 % (ref 0–1)
BILIRUB SERPL-MCNC: 0.63 MG/DL (ref 0.2–1)
BUN SERPL-MCNC: 17 MG/DL (ref 5–25)
CALCIUM ALBUM COR SERPL-MCNC: 8.8 MG/DL (ref 8.3–10.1)
CALCIUM SERPL-MCNC: 7.7 MG/DL (ref 8.4–10.2)
CHLORIDE SERPL-SCNC: 108 MMOL/L (ref 96–108)
CO2 SERPL-SCNC: 22 MMOL/L (ref 21–32)
CREAT SERPL-MCNC: 1.84 MG/DL (ref 0.6–1.3)
EOSINOPHIL # BLD AUTO: 0.21 THOUSAND/ÂΜL (ref 0–0.61)
EOSINOPHIL NFR BLD AUTO: 1 % (ref 0–6)
ERYTHROCYTE [DISTWIDTH] IN BLOOD BY AUTOMATED COUNT: 12.4 % (ref 11.6–15.1)
GFR SERPL CREATININE-BSD FRML MDRD: 40 ML/MIN/1.73SQ M
GLUCOSE SERPL-MCNC: 129 MG/DL (ref 65–140)
GLUCOSE SERPL-MCNC: 196 MG/DL (ref 65–140)
GLUCOSE SERPL-MCNC: 210 MG/DL (ref 65–140)
GLUCOSE SERPL-MCNC: 249 MG/DL (ref 65–140)
GLUCOSE SERPL-MCNC: 259 MG/DL (ref 65–140)
GLUCOSE SERPL-MCNC: 280 MG/DL (ref 65–140)
GLUCOSE SERPL-MCNC: 294 MG/DL (ref 65–140)
GLUCOSE SERPL-MCNC: 311 MG/DL (ref 65–140)
GLUCOSE SERPL-MCNC: 353 MG/DL (ref 65–140)
GLUCOSE SERPL-MCNC: 369 MG/DL (ref 65–140)
HCT VFR BLD AUTO: 20.8 % (ref 36.5–49.3)
HGB BLD-MCNC: 7.2 G/DL (ref 12–17)
IMM GRANULOCYTES # BLD AUTO: 0.09 THOUSAND/UL (ref 0–0.2)
IMM GRANULOCYTES NFR BLD AUTO: 1 % (ref 0–2)
LYMPHOCYTES # BLD AUTO: 2.98 THOUSANDS/ÂΜL (ref 0.6–4.47)
LYMPHOCYTES NFR BLD AUTO: 19 % (ref 14–44)
MAGNESIUM SERPL-MCNC: 2.1 MG/DL (ref 1.9–2.7)
MCH RBC QN AUTO: 30.9 PG (ref 26.8–34.3)
MCHC RBC AUTO-ENTMCNC: 34.6 G/DL (ref 31.4–37.4)
MCV RBC AUTO: 89 FL (ref 82–98)
MONOCYTES # BLD AUTO: 1.1 THOUSAND/ÂΜL (ref 0.17–1.22)
MONOCYTES NFR BLD AUTO: 7 % (ref 4–12)
NEUTROPHILS # BLD AUTO: 11.35 THOUSANDS/ÂΜL (ref 1.85–7.62)
NEUTS SEG NFR BLD AUTO: 72 % (ref 43–75)
NRBC BLD AUTO-RTO: 0 /100 WBCS
PHOSPHATE SERPL-MCNC: 3.2 MG/DL (ref 2.7–4.5)
PLATELET # BLD AUTO: 176 THOUSANDS/UL (ref 149–390)
PMV BLD AUTO: 11.2 FL (ref 8.9–12.7)
POTASSIUM SERPL-SCNC: 4.2 MMOL/L (ref 3.5–5.3)
PROT SERPL-MCNC: 4.8 G/DL (ref 6.4–8.4)
RBC # BLD AUTO: 2.33 MILLION/UL (ref 3.88–5.62)
SODIUM SERPL-SCNC: 134 MMOL/L (ref 135–147)
WBC # BLD AUTO: 15.76 THOUSAND/UL (ref 4.31–10.16)

## 2025-03-17 PROCEDURE — 85025 COMPLETE CBC W/AUTO DIFF WBC: CPT | Performed by: NURSE PRACTITIONER

## 2025-03-17 PROCEDURE — 82948 REAGENT STRIP/BLOOD GLUCOSE: CPT

## 2025-03-17 PROCEDURE — 84100 ASSAY OF PHOSPHORUS: CPT | Performed by: NURSE PRACTITIONER

## 2025-03-17 PROCEDURE — 83735 ASSAY OF MAGNESIUM: CPT | Performed by: NURSE PRACTITIONER

## 2025-03-17 PROCEDURE — 80053 COMPREHEN METABOLIC PANEL: CPT | Performed by: NURSE PRACTITIONER

## 2025-03-17 PROCEDURE — 99232 SBSQ HOSP IP/OBS MODERATE 35: CPT | Performed by: HOSPITALIST

## 2025-03-17 RX ORDER — INSULIN LISPRO 100 [IU]/ML
1-5 INJECTION, SOLUTION INTRAVENOUS; SUBCUTANEOUS
Status: DISCONTINUED | OUTPATIENT
Start: 2025-03-17 | End: 2025-03-18 | Stop reason: HOSPADM

## 2025-03-17 RX ADMIN — LABETALOL HYDROCHLORIDE 10 MG: 5 INJECTION, SOLUTION INTRAVENOUS at 22:37

## 2025-03-17 RX ADMIN — LABETALOL HYDROCHLORIDE 10 MG: 5 INJECTION, SOLUTION INTRAVENOUS at 17:05

## 2025-03-17 RX ADMIN — HEPARIN SODIUM 5000 UNITS: 5000 INJECTION INTRAVENOUS; SUBCUTANEOUS at 13:25

## 2025-03-17 RX ADMIN — AMLODIPINE BESYLATE 5 MG: 5 TABLET ORAL at 07:49

## 2025-03-17 RX ADMIN — AMLODIPINE BESYLATE 5 MG: 5 TABLET ORAL at 17:05

## 2025-03-17 RX ADMIN — Medication 50 MG: at 07:49

## 2025-03-17 RX ADMIN — PANCRELIPASE 24000 UNITS: 120000; 24000; 76000 CAPSULE, DELAYED RELEASE PELLETS ORAL at 17:04

## 2025-03-17 RX ADMIN — INSULIN HUMAN 4 UNITS: 100 INJECTION, SUSPENSION SUBCUTANEOUS at 17:05

## 2025-03-17 RX ADMIN — INSULIN LISPRO 3 UNITS: 100 INJECTION, SOLUTION INTRAVENOUS; SUBCUTANEOUS at 17:42

## 2025-03-17 RX ADMIN — HEPARIN SODIUM 5000 UNITS: 5000 INJECTION INTRAVENOUS; SUBCUTANEOUS at 21:25

## 2025-03-17 RX ADMIN — FOLIC ACID 1 MG: 1 TABLET ORAL at 07:49

## 2025-03-17 RX ADMIN — LABETALOL HYDROCHLORIDE 10 MG: 5 INJECTION, SOLUTION INTRAVENOUS at 12:05

## 2025-03-17 RX ADMIN — PANCRELIPASE 24000 UNITS: 120000; 24000; 76000 CAPSULE, DELAYED RELEASE PELLETS ORAL at 07:49

## 2025-03-17 RX ADMIN — LABETALOL HYDROCHLORIDE 10 MG: 5 INJECTION, SOLUTION INTRAVENOUS at 04:12

## 2025-03-17 RX ADMIN — HEPARIN SODIUM 5000 UNITS: 5000 INJECTION INTRAVENOUS; SUBCUTANEOUS at 05:24

## 2025-03-17 RX ADMIN — PANCRELIPASE 24000 UNITS: 120000; 24000; 76000 CAPSULE, DELAYED RELEASE PELLETS ORAL at 12:05

## 2025-03-17 NOTE — PLAN OF CARE

## 2025-03-17 NOTE — ASSESSMENT & PLAN NOTE
Lab Results   Component Value Date    HGBA1C 9.8 (H) 02/05/2025       Recent Labs     03/17/25  1001 03/17/25  1300 03/17/25  1429 03/17/25  1554   POCGLU 280* 369* 353* 311*       Blood Sugar Average: Last 72 hrs:  (P) 183.8

## 2025-03-17 NOTE — UTILIZATION REVIEW
Initial Clinical Review    Admission: Date/Time/Statement:   Admission Orders (From admission, onward)       Ordered        03/16/25 0350  INPATIENT ADMISSION  Once                          Orders Placed This Encounter   Procedures    INPATIENT ADMISSION     Standing Status:   Standing     Number of Occurrences:   1     Level of Care:   Level 1 Stepdown [13]     Estimated length of stay:   More than 2 Midnights     Certification:   I certify that inpatient services are medically necessary for this patient for a duration of greater than two midnights. See H&P and MD Progress Notes for additional information about the patient's course of treatment.     ED Arrival Information       Expected   -    Arrival   3/16/2025 00:53    Acuity   Emergent              Means of arrival   Stretcher    Escorted by   [facility] ([facility])    Service   Hospitalist    Admission type   Emergency              Arrival complaint   Hypoglycemia             Chief Complaint   Patient presents with    Hypoglycemia - Symptomatic     BG 26 when found by EMS - given 1g glucagon mos recent BG 96       Initial Presentation: 55 y.o. male presents to the ED via EMS from home with c/o low blood sugar - 16 per family and lethargy.   Home insulin dosing NPH 5 u BID but family told EMS pt was taking 25 u BID. .  EMS recheck was 26 and tx with Dextrose w/ f/u glucose 95.  PMH: HTN, IDDM, CKD3b, PAF, AUD, chronic pancreatitis.  In the ED hypothermia, VS stable.  No pain.  Treated with IV D10/NSS, IV Mag, Sq Heparin, IV Normodyne, IV Thiamine, folate, IV D50.  Labs - elevated WBC, glucose, creat, lactic acid, procal, LFTs, low Mag, abnormal UA, lacidotic gasses. ECG - NSR w/ prolonged QT.  Imaging - no acute disease.  On exam ill-appearing, normal heart and breath sounds.  Awakens easily, mumbling responses. Follows commands, LAUREN X 4, has ibrahim in place.  Admitted to INPATIENT status to Level 1 SD and then to Level 2 SD with Hypoglycemia, IDDM,  Metabolic acidosis, hypothermia, CIWA - initial 1, 6,5,0, HTN - PLAN: IV D10 hold NPH insulin, avoid hypoglycemia, hourly POC glucose, insulin antibody, consider Endocrinology consult, hold PO Home meds, Tele, IV fluids, trend lactic acid til WNL, warm air blanket, BMP AM. Initial GCS 11 then 12, 14. .     Date: 3/17   Day 2: Level 2 SD:    Hypoglycemia, IDDM, Metabolic acidosis, hypothermia  - temperature WNL.  Intermittent HTN, CIWA 0.  Leukocytosis downtrending, elevated creat, Hgb 7.2, low NA, calcium, downtrending LFTs, glucose 259, blood cultures negative.  IV fluids d/c last PM.  GCS 15 today.  Resting insulin tonight with glucose into mid 300s. Pt notes he takes insulin and then falls back to sleep.  On exam has no deficits.     ED Treatment-Medication Administration from 03/16/2025 0053 to 03/16/2025 1448         Date/Time Order Dose Route Action     03/16/2025 0105 glucagon (FOR EMS ONLY) (GLUCAGEN) injection 1 mg 0 mg Does not apply Given to EMS     03/16/2025 0136 dextrose 10 % and normal saline infusion 75 mL/hr Intravenous New Bag     03/16/2025 0155 lactated ringers bolus 1,000 mL 1,000 mL Intravenous New Bag     03/16/2025 0222 magnesium sulfate 4 g/100 mL IVPB (premix) 4 g 4 g Intravenous New Bag     03/16/2025 0320 dextrose 10 % and normal saline infusion 75 mL/hr Intravenous New Bag     03/16/2025 0900 amLODIPine (NORVASC) tablet 5 mg -- Oral Held Dose     03/16/2025 0900 folic acid (FOLVITE) tablet 1 mg -- Oral Held Dose     03/16/2025 0730 pancrelipase (Lip-Prot-Amyl) (CREON) delayed release capsule 24,000 Units -- Oral Held Dose     03/16/2025 1245 pancrelipase (Lip-Prot-Amyl) (CREON) delayed release capsule 24,000 Units 24,000 Units Oral Given     03/16/2025 0900 thiamine tablet 50 mg -- Oral Held Dose     03/16/2025 0905 chlorhexidine (PERIDEX) 0.12 % oral rinse 15 mL 15 mL Mouth/Throat Given     03/16/2025 0603 heparin (porcine) subcutaneous injection 5,000 Units 5,000 Units Subcutaneous  Given     03/16/2025 1328 heparin (porcine) subcutaneous injection 5,000 Units 5,000 Units Subcutaneous Given     03/16/2025 0400 dextrose 50 % IV solution 50 mL 50 mL Intravenous Given     03/16/2025 0601 labetalol (NORMODYNE) injection 10 mg 10 mg Intravenous Given     03/16/2025 1100 labetalol (NORMODYNE) injection 10 mg 10 mg Intravenous Given     03/16/2025 0905 thiamine (VITAMIN B1) 100 mg in sodium chloride 0.9 % 50 mL IVPB 100 mg Intravenous New Bag     03/16/2025 0618 dextrose 50 % IV solution 25 mL 25 mL Intravenous Given            Scheduled Medications:  amLODIPine, 5 mg, Oral, BID  folic acid, 1 mg, Oral, Daily  heparin (porcine), 5,000 Units, Subcutaneous, Q8H JAISON  insulin lispro, 1-5 Units, Subcutaneous, TID AC  insulin lispro, 1-5 Units, Subcutaneous, HS  insulin NPH, 4 Units, Subcutaneous, BID AC  labetalol, 10 mg, Intravenous, Q6H  pancrelipase (Lip-Prot-Amyl), 24,000 Units, Oral, TID With Meals  thiamine, 50 mg, Oral, Daily      Continuous IV Infusions:    IV D10/NSS @ 75 cc/hr - d/c 3/16     PRN Meds:     ED Triage Vitals   Temperature Pulse Respirations Blood Pressure SpO2 Pain Score   03/16/25 0117 03/16/25 0058 03/16/25 0100 03/16/25 0100 03/16/25 0100 03/16/25 0557   (!) 91 °F (32.8 °C) 91 16 150/80 100 % No Pain     Weight (last 2 days)       Date/Time Weight    03/18/25 0600 59.5 (131.06)    03/17/25 0600 59.3 (130.73)    03/16/25 0553 58.8 (129.63)    03/16/25 0300 63 (138.89)            Vital Signs (last 3 days)       Date/Time Temp Pulse Resp BP MAP (mmHg) SpO2 O2 Device Patient Position - Orthostatic VS Liliana Coma Scale Score CIWA-Ar Total Pain    03/18/25 0815 -- -- -- -- -- -- -- -- 15 -- No Pain    03/18/25 0723 98.5 °F (36.9 °C) 79 18 156/76 108 99 % None (Room air) Lying -- -- --    03/18/25 0500 -- 88 -- 125/60 -- -- -- -- -- -- --    03/18/25 0343 -- -- -- -- -- -- -- -- 15 -- --    03/18/25 0321 100 °F (37.8 °C) 90 18 121/59 85 99 % None (Room air) Lying -- -- --    03/17/25  2302 98.9 °F (37.2 °C) 86 18 123/64 89 99 % -- Lying 15 -- --    03/17/25 2236 -- 88 -- 152/70 -- -- -- -- -- -- --    03/17/25 1915 -- -- -- -- -- -- -- -- 15 -- No Pain    03/17/25 1907 98.1 °F (36.7 °C) 92 18 144/70 100 100 % -- Lying -- -- --    03/17/25 1615 -- -- -- -- -- -- -- -- 15 -- No Pain    03/17/25 1551 98.5 °F (36.9 °C) 87 18 175/80 115 99 % -- Lying -- -- --    03/17/25 1215 -- -- -- -- -- -- -- -- 15 -- --    03/17/25 1135 98.6 °F (37 °C) 90 18 153/75 108 100 % None (Room air) Lying -- -- --    03/17/25 0815 -- -- -- -- -- -- -- -- 15 -- No Pain    03/17/25 0701 98 °F (36.7 °C) 85 16 180/84 121 99 % None (Room air) Lying -- -- --    03/17/25 0400 -- -- -- -- -- -- None (Room air) -- 15 -- No Pain    03/17/25 0345 97.9 °F (36.6 °C) 91 16 163/77 111 98 % None (Room air) Lying -- 0 --    03/17/25 0009 -- -- -- -- -- -- None (Room air) -- 15 -- No Pain    03/16/25 2252 98.3 °F (36.8 °C) 94 17 121/63 79 99 % None (Room air) Lying -- 0 --    03/16/25 1941 -- -- -- -- -- -- None (Room air) -- 15 -- No Pain    03/16/25 1900 -- -- -- -- -- -- -- -- -- 0 --    03/16/25 1500 98.2 °F (36.8 °C) 86 18 174/91 129 99 % None (Room air) Lying 15 5 No Pain    03/16/25 1444 97.8 °F (36.6 °C) 90 18 161/85 109 100 % None (Room air) Lying -- -- --    03/16/25 1424 98.5 °F (36.9 °C) -- -- -- -- -- -- -- -- -- --    03/16/25 1300 -- -- -- -- -- -- -- -- 15 -- --    03/16/25 1230 99.1 °F (37.3 °C) 85 15 175/80 -- 99 % -- -- -- -- 7    03/16/25 1100 -- 84 -- 197/91 -- -- -- -- -- 6 --    03/16/25 1037 99.1 °F (37.3 °C) 96 20 197/91 -- 100 % -- -- -- -- --    03/16/25 0900 98.4 °F (36.9 °C) 80 12 152/74 -- 99 % -- -- 14 -- --    03/16/25 0830 98.1 °F (36.7 °C) 84 16 174/88 -- 100 % -- -- -- -- --    03/16/25 0815 97.9 °F (36.6 °C) 84 16 184/91 130 100 % -- -- -- -- --    03/16/25 0730 97.7 °F (36.5 °C) 79 14 169/81 117 100 % -- -- -- -- --    03/16/25 0702 -- 76 -- 178/83 -- -- -- -- 14 1 No Pain    03/16/25 0645 97 °F (36.1  °C) 79 18 162/84 117 100 % -- -- -- -- --    03/16/25 0621 -- -- -- 173/84 -- -- -- -- -- -- --    03/16/25 0600 97 °F (36.1 °C) 85 13 196/98 140 100 % -- -- -- -- --    03/16/25 0557 97 °F (36.1 °C) 87 16 209/103 -- 100 % None (Room air) -- -- -- No Pain    03/16/25 0500 96.6 °F (35.9 °C) 79 14 160/86 118 99 % -- -- 14 -- --    03/16/25 0430 96.4 °F (35.8 °C) 81 13 174/82 118 97 % -- -- -- -- --    03/16/25 0345 95.2 °F (35.1 °C) 80 15 171/79 114 96 % -- -- -- -- --    03/16/25 0330 94.8 °F (34.9 °C) 79 12 162/79 113 97 % -- -- -- -- --    03/16/25 0315 94.5 °F (34.7 °C) 79 12 189/90 128 97 % -- -- -- -- --    03/16/25 0300 94.1 °F (34.5 °C) 77 23 179/88 122 99 % -- -- -- -- --    03/16/25 0252 -- -- -- -- -- -- -- -- 12 -- --    03/16/25 0245 93.9 °F (34.4 °C) 76 -- 189/84 121 99 % -- -- -- -- --    03/16/25 0237 -- -- -- -- -- -- -- -- 11 -- --    03/16/25 0230 93.6 °F (34.2 °C) 75 -- 171/86 122 100 % -- -- -- -- --    03/16/25 0227 93.6 °F (34.2 °C) -- -- -- -- -- -- -- -- -- --    03/16/25 0215 92.3 °F (33.5 °C) 80 -- 191/88 126 100 % -- -- -- -- --    03/16/25 0200 -- 79 26 163/88 115 100 % -- -- -- -- --    03/16/25 0145 -- 84 20 170/88 123 100 % -- -- -- -- --    03/16/25 0139 -- -- -- -- -- -- -- -- 6 -- --    03/16/25 0130 -- 87 18 190/93 -- -- -- -- -- -- --    03/16/25 0117 91 °F (32.8 °C) -- -- -- -- -- -- -- -- -- --    03/16/25 0100 -- 92 16 150/80 104 100 % -- -- -- -- --    03/16/25 0058 -- 91 -- -- -- -- -- -- -- -- --           CIWA-Ar Score       Row Name 03/17/25 0345 03/16/25 2252 03/16/25 1900       CIWA-Ar    Nausea and Vomiting 0 0 0    Tactile Disturbances 0 0 0    Tremor 0 0 0    Auditory Disturbances 0 0 0    Paroxysmal Sweats 0 0 0    Visual Disturbances 0 0 0    Anxiety 0 0 0    Headache, Fullness in Head 0 0 0    Agitation 0 0 0    Orientation and Clouding of Sensorium 0 0 0    CIWA-Ar Total 0 0 0      Row Name 03/16/25 1500 03/16/25 1100 03/16/25 0702       CIWA-Ar    BP -- 197/91  178/83    Pulse -- 84 76    Nausea and Vomiting 0 0 0    Tactile Disturbances 0 0 0    Tremor 4 4 0    Auditory Disturbances 0 0 0    Paroxysmal Sweats 0 0 0    Visual Disturbances 0 0 0    Anxiety 0 0 0    Headache, Fullness in Head 0 0 0    Agitation 0 1 0    Orientation and Clouding of Sensorium 1 1 1    CIWA-Ar Total 5 6 1                    Pertinent Labs/Diagnostic Test Results:   Radiology:  XR chest 1 view portable   ED Interpretation by Florin Chawla DO (03/16 0209)   No pneumothorax, no effusion, no infiltrate      Final Interpretation by Kushal Morejon MD (03/16 1024)      No acute cardiopulmonary disease.            Workstation performed: [id number]         CT head without contrast   Final Interpretation by Joshua Loredo DO (03/16 0326)      No acute intracranial abnormality.                  Workstation performed: [id number]           Cardiology:  ECG 12 lead   Final Result by Marcos Reeves MD (03/16 0904)   Normal sinus rhythm   Normal ECG   When compared with ECG of 16-Mar-2025 01:04, (unconfirmed)   No significant change was found   Confirmed by Marcos Reeves ([id number]) on 3/16/2025 9:04:01 AM      ECG 12 lead   Final Result by Marcos Reeves MD (03/16 0904)   Normal sinus rhythm   Prolonged QT   Abnormal ECG   When compared with ECG of 06-Feb-2025 08:17,   No significant change was found   Confirmed by Marcos Reeves ([id number]) on 3/16/2025 9:04:35 AM        GI:  No orders to display       Results from last 7 days   Lab Units 03/16/25  0118   SARS-COV-2  Negative     Results from last 7 days   Lab Units 03/18/25 0437 03/17/25 0435 03/16/25  0537 03/16/25  0120   WBC Thousand/uL 11.16* 15.76* 18.92* 12.80*   HEMOGLOBIN g/dL 8.0* 7.2* 7.9* 9.4*   HEMATOCRIT % 24.0* 20.8* 22.8* 28.7*   PLATELETS Thousands/uL 183 176 198 247   TOTAL NEUT ABS Thousands/µL 7.18 11.35* 16.25* 9.83*         Results from last 7 days   Lab Units 03/18/25 0437 03/17/25 0435  03/16/25  0537 03/16/25  0128   SODIUM mmol/L 132* 134* 138 139   POTASSIUM mmol/L 4.7 4.2 3.6 3.8   CHLORIDE mmol/L 104 108 113* 111*   CO2 mmol/L 22 22 22 19*   ANION GAP mmol/L 6 4 3* 9   BUN mg/dL 24 17 15 15   CREATININE mg/dL 1.90* 1.84* 1.21 1.43*   EGFR ml/min/1.73sq m 38 40 66 54   CALCIUM mg/dL 7.8* 7.7* 7.9* 8.2*   CALCIUM, IONIZED mmol/L  --   --  1.16  --    MAGNESIUM mg/dL 1.9 2.1 2.6 1.8*   PHOSPHORUS mg/dL  --  3.2 4.0  --      Results from last 7 days   Lab Units 03/17/25  0435 03/16/25  0128 03/16/25  0120   AST U/L 41* 46*  --    ALT U/L 51 61*  --    ALK PHOS U/L 104 123*  --    TOTAL PROTEIN g/dL 4.8* 5.8*  --    ALBUMIN g/dL 2.6* 3.3*  --    TOTAL BILIRUBIN mg/dL 0.63 0.39  --    AMMONIA umol/L  --   --  34     Results from last 7 days   Lab Units 03/18/25  0724 03/17/25  2104 03/17/25  1554 03/17/25  1429 03/17/25  1300 03/17/25  1001 03/17/25  0754 03/17/25  0612 03/17/25  0411 03/17/25  0307 03/16/25  2304 03/16/25  2145   POC GLUCOSE mg/dl 250* 129 311* 353* 369* 280* 196* 210* 249* 294* 296* 270*     Results from last 7 days   Lab Units 03/18/25  0437 03/17/25  0435 03/16/25  0537 03/16/25  0128   GLUCOSE RANDOM mg/dL 317* 259* 81 158*             Beta- Hydroxybutyrate   Date Value Ref Range Status   02/05/2025 <0.05 0.02 - 0.27 mmol/L Final   10/22/2024 0.08 0.02 - 0.27 mmol/L Final   04/10/2024 <0.05 0.02 - 0.27 mmol/L Final          Results from last 7 days   Lab Units 03/16/25  0319 03/16/25  0120   PH [place]  7.273* 7.115*   PCO2 [place] mm Hg 45.4 47.8   PO2 [place] mm Hg 29.5* 45.8*   HCO3 [place] mmol/L 20.5* 15.0*   BASE EXC [place] mmol/L -6.0 -13.9   O2 CONTENT [place] ml/dL 6.7 11.1   O2 HGB, VENOUS % 56.0* 72.1         Results from last 7 days   Lab Units 03/16/25  0128   CK TOTAL U/L 61             Results from last 7 days   Lab Units 03/16/25  0128   PROTIME seconds 14.5   INR  1.11   PTT seconds 24         Results from last 7 days   Lab Units 03/16/25  0537 03/16/25  0120   PROCALCITONIN ng/ml  0.50* 0.14     Results from last 7 days   Lab Units 03/16/25  0356 03/16/25  0120   LACTIC ACID mmol/L 1.0 6.5*       Results from last 7 days   Lab Units 03/16/25  0212   CLARITY UA  Clear   COLOR UA  Colorless   SPEC GRAV UA  1.008   PH UA  5.5   GLUCOSE UA mg/dl Negative   KETONES UA mg/dl Negative   BLOOD UA  Trace*   PROTEIN UA mg/dl 100 (2+)*   NITRITE UA  Negative   BILIRUBIN UA  Negative   UROBILINOGEN UA (BE) mg/dl <2.0   LEUKOCYTES UA  Negative   WBC UA /hpf 1-2   RBC UA /hpf 1-2   BACTERIA UA /hpf None Seen   EPITHELIAL CELLS WET PREP /hpf Occasional     Results from last 7 days   Lab Units 03/16/25  0118   INFLUENZA A PCR  Negative   INFLUENZA B PCR  Negative   RSV PCR  Negative             Results from last 7 days   Lab Units 03/16/25  0128 03/16/25  0120   ETHANOL LVL mg/dL  --  <10   ACETAMINOPHEN LVL ug/mL <2*  --    SALICYLATE LVL mg/dL <5  --                  Results from last 7 days   Lab Units 03/16/25  0128 03/16/25  0127   BLOOD CULTURE  No Growth at 48 hrs. No Growth at 48 hrs.         Past Medical History:   Diagnosis Date    Alcohol abuse     Chronic pancreatitis (HCC)     Diabetes mellitus (HCC)     Type 2    Pancreatitis     Paroxysmal A-fib (HCC)     Thrombocytopenia (HCC)      Present on Admission:   Hypoglycemia   Primary hypertension   Paroxysmal A-fib (HCC)   Chronic pancreatitis (HCC)   CKD stage 3b, GFR 30-44 ml/min (HCC)   Hypothermia   Metabolic acidosis      Admitting Diagnosis: Lactic acidosis [E87.20]  Hypomagnesemia [E83.42]  Metabolic acidosis [E87.20]  Hypoglycemia [E16.2]  SIRS (systemic inflammatory response syndrome) (HCC) [R65.10]  Hypothermia, initial encounter [T68.XXXA]  Acute metabolic encephalopathy [G93.41]  Age/Sex: 55 y.o. male    Network Utilization Review Department  ATTENTION: Please call with any questions or concerns to [phone] and carefully listen to the prompts so that you are directed to the right person. All voicemails are confidential.   For  Discharge needs, contact Care Management DC Support Team at [phone] opt. 2  Send all requests for admission clinical reviews, approved or denied determinations and any other requests to dedicated fax number below belonging to the campus where the patient is receiving treatment. List of dedicated fax numbers for the Facilities:  FACILITY NAME UR FAX NUMBER   ADMISSION DENIALS (Administrative/Medical Necessity) [phone]   DISCHARGE SUPPORT TEAM (NETWORK) [phone]   PARENT CHILD HEALTH (Maternity/NICU/Pediatrics) [phone]   [facility] [phone]   [facility] [phone]   [facility] [phone]   [facility] [phone]   [facility] [phone]   [facility] [phone]   [facility] [phone]   [facility] [phone]   [facility] [phone]   [facility] [phone]   [facility] [phone]   [facility] [phone]

## 2025-03-17 NOTE — ASSESSMENT & PLAN NOTE
Due to taking insulin and then falling asleep and not eating    He is now hyperglycemic send going to slowly start back his insulin tonight.  Likely can go home tomorrow if he can get out of the control

## 2025-03-17 NOTE — PROGRESS NOTES
Progress Note - Hospitalist   Name: Wes Araujo 55 y.o. male I MRN: [id number]  Unit/Bed#: E4 -01 I Date of Admission: 3/16/2025   Date of Service: 3/17/2025 I Hospital Day: 1    Assessment & Plan  Hypoglycemia  Due to taking insulin and then falling asleep and not eating    He is now hyperglycemic send going to slowly start back his insulin tonight.  Likely can go home tomorrow if he can get out of the control  Primary hypertension    Type 2 diabetes mellitus with hypoglycemia, with long-term current use of insulin ([facility])  Lab Results   Component Value Date    HGBA1C 9.8 (H) 2025       Recent Labs     25  1001 25  1300 25  1429 25  1554   POCGLU 280* 369* 353* 311*       Blood Sugar Average: Last 72 hrs:  (P) 183.8    Paroxysmal A-fib ([facility])  Not on any outpatient meds for this  Chronic pancreatitis ([facility])    Metabolic acidosis    Hypothermia    CKD stage 3b, GFR 30-44 ml/min ([facility])  Lab Results   Component Value Date    EGFR 40 2025    EGFR 66 2025    EGFR 54 2025    CREATININE 1.84 (H) 2025    CREATININE 1.21 2025    CREATININE 1.43 (H) 2025       VTE Pharmacologic Prophylaxis:                 Current Length of Stay: 1 day(s)  Current Patient Status: Inpatient   Certification Statement:     Discharge Plan:         Subjective    Feels well.  States he took his insulin prior to coming to the hospital and then fell asleep without eating.  He states often when he does as he gets very low blood sugars    Objective   Vitals:   Temp (24hrs), Av.3 °F (36.8 °C), Min:97.9 °F (36.6 °C), Max:98.6 °F (37 °C)    Temp:  [97.9 °F (36.6 °C)-98.6 °F (37 °C)] 98.5 °F (36.9 °C)  HR:  [85-94] 87  Resp:  [16-18] 18  BP: (121-180)/(63-84) 175/80  SpO2:  [98 %-100 %] 99 %  Body mass index is 19.31 kg/m².         Physical Exam  Vitals and nursing note reviewed.   Constitutional:       General: He is not in acute distress.     Appearance: He is well-developed.    HENT:      Head: Normocephalic and atraumatic.   Eyes:      Conjunctiva/sclera: Conjunctivae normal.   Cardiovascular:      Rate and Rhythm: Normal rate and regular rhythm.      Heart sounds: No murmur heard.  Pulmonary:      Effort: Pulmonary effort is normal. No respiratory distress.      Breath sounds: Normal breath sounds.   Abdominal:      Palpations: Abdomen is soft.      Tenderness: There is no abdominal tenderness.   Musculoskeletal:         General: No swelling.      Cervical back: Neck supple.      Right lower leg: No edema.      Left lower leg: No edema.   Skin:     General: Skin is warm and dry.      Capillary Refill: Capillary refill takes less than 2 seconds.   Neurological:      Mental Status: He is alert.   Psychiatric:         Mood and Affect: Mood normal.           Lab results  Results from last 7 days   Lab Units 03/17/25 0435 03/16/25 0537 03/16/25 0128 03/16/25  0120   WBC Thousand/uL 15.76* 18.92*  --  12.80*   HEMOGLOBIN g/dL 7.2* 7.9*  --  9.4*   PLATELETS Thousands/uL 176 198  --  247   MCV fL 89 89  --  92   INR   --   --  1.11  --      Results from last 7 days   Lab Units 03/17/25 0435 03/16/25 0537 03/16/25  0128   SODIUM mmol/L 134* 138 139   POTASSIUM mmol/L 4.2 3.6 3.8   CHLORIDE mmol/L 108 113* 111*   CO2 mmol/L 22 22 19*   ANION GAP mmol/L 4 3* 9   BUN mg/dL 17 15 15   CREATININE mg/dL 1.84* 1.21 1.43*   CALCIUM mg/dL 7.7* 7.9* 8.2*   ALBUMIN g/dL 2.6*  --  3.3*   TOTAL BILIRUBIN mg/dL 0.63  --  0.39   ALK PHOS U/L 104  --  123*   ALT U/L 51  --  61*   AST U/L 41*  --  46*   EGFR ml/min/1.73sq m 40 66 54   GLUCOSE RANDOM mg/dL 259* 81 158*     Results from last 7 days   Lab Units 03/17/25 0435 03/16/25 0537 03/16/25  0128   MAGNESIUM mg/dL 2.1 2.6 1.8*   PHOSPHORUS mg/dL 3.2 4.0  --      Results from last 7 days   Lab Units 03/16/25  0128   CK TOTAL U/L 61         Last 24 Hours Medication List:     Current Facility-Administered Medications:     amLODIPine (NORVASC) tablet 5  mg, BID    folic acid (FOLVITE) tablet 1 mg, Daily    heparin (porcine) subcutaneous injection 5,000 Units, Q8H JAISON    insulin NPH (HumuLIN N,NovoLIN N) 100 Units/mL subcutaneous injection 4 Units, BID AC    labetalol (NORMODYNE) injection 10 mg, Q6H    pancrelipase (Lip-Prot-Amyl) (CREON) delayed release capsule 24,000 Units, TID With Meals    thiamine tablet 50 mg, Daily

## 2025-03-17 NOTE — CASE MANAGEMENT
Case Management Assessment & Discharge Planning Note    Patient name Wes Araujo  Location [facility] 4 /E4 -* MRN [id number]  : 1970 Date 3/17/2025       Current Admission Date: 3/16/2025  Current Admission Diagnosis:Hypoglycemia   Patient Active Problem List    Diagnosis Date Noted Date Diagnosed    Unintentional weight loss 2025     Acute metabolic encephalopathy 2025     CKD stage 3b, GFR 30-44 ml/min ([facility]) 2025     Hyperglycemia without ketosis 10/22/2024     Hypertensive urgency 10/22/2024     Transaminitis 2024     Alcohol-induced chronic pancreatitis ([facility]) 2024     Acute low back pain 03/10/2024     SIRS (systemic inflammatory response syndrome) ([facility]) 2024     Hyperosmolar hyperglycemic state (HHS) ([facility]) 2024     Alcohol abuse with history of withdrawal ([facility]) 2024     Elevated brain natriuretic peptide (BNP) level 2024     Type 2 diabetes mellitus ([facility]) 2024     Chronic low back pain 2023     Alcohol withdrawal ([facility]) 2023     Diarrhea 2023     Seizure ([facility]) 2022     Illicit drug use 2022     Leukocytosis 2022     Hypothermia 2022     Cocaine use 2022     Abnormal chest x-ray 2022     Bilateral hearing loss 2020     Hypoglycemia 2020     Hypokalemia 2020     Acute renal failure superimposed on stage 3 chronic kidney disease (HCC) 2019     Anemia 2019     Hyponatremia 2019     History of atrial fibrillation 2019     Acute encephalopathy 2019     Metabolic acidosis 2019     ZENAIDA (acute kidney injury) ([facility]) 2019     Alcohol intoxication in active alcoholic with complication ([facility]) 2016     Primary hypertension      Type 2 diabetes mellitus with hypoglycemia, with long-term current use of insulin (HCC)      Uncomplicated alcohol dependence (HCC)      Paroxysmal A-fib (HCC)      Chronic pancreatitis (HCC)       Thrombocytopenia (HCC)        LOS (days): 1  Geometric Mean LOS (GMLOS) (days):   Days to GMLOS:     OBJECTIVE:    Risk of Unplanned Readmission Score: 27.84         Current admission status: Inpatient  Referral Reason: Other (Dispo planning)    Preferred Pharmacy:    [facility] [address]  [address] [facility] [address]  Phone: [phone] Fax: [phone]    [facility] [place] - [address]  [facility],  [address],  First [address] [facility] [address]  Phone: [phone] Fax: [phone]    [id number] [place] - [address]  [address] [facility] [address]  Phone: [phone] Fax: [phone]    Primary Care Provider: Rush Kebede MD    Primary Insurance: [employer]  Secondary Insurance:     ASSESSMENT:  Active Health Care Proxies       Wes Araujo Community Health Representative - Father   Primary Phone: [phone] (Mobile)  Home Phone: [phone]                 Advance Directives  Does patient have a Health Care POA?: No  Was patient offered paperwork?: Yes (Declined)  Does patient currently have a Health Care decision maker?: Yes, please see Health Care Proxy section  Does patient have Advance Directives?: No  Was patient offered paperwork?: Yes (Declined)         Readmission Root Cause  30 Day Readmission: No    Patient Information  Admitted from:: Home  Mental Status: Alert  During Assessment patient was accompanied by: Not accompanied during assessment  Assessment information provided by:: Patient  Primary Caregiver: Self  Support Systems: Self, Parent  County of Residence: [place]  What city do you live in?: [place]  Home entry access options. Select all that apply.: Stairs  Number of steps to enter home.: One Flight  Do the steps have railings?: Yes  Type of Current Residence: Apartment  Floor Level: 2  Upon entering residence, is there a bedroom on the main floor (no  further steps)?: Yes  Upon entering residence, is there a bathroom on the main floor (no further steps)?: Yes  Living Arrangements: Lives w/ Parent(s)  Is patient a ?: No    Activities of Daily Living Prior to Admission  Functional Status: Independent  Completes ADLs independently?: Yes  Ambulates independently?: Yes  Does patient use assisted devices?: No  Does patient currently own DME?: No  Does patient have a history of Outpatient Therapy (PT/OT)?: No  Does the patient have a history of Short-Term Rehab?: No  Does patient have a history of HHC?: No  Does patient currently have HHC?: No         Patient Information Continued  Income Source: SSI/SSD  Does patient have prescription coverage?: Yes  Can the patient afford their medications and any related supplies (such as glucometers or test strips)?: Yes  Does patient receive dialysis treatments?: No  Does patient have a history of substance abuse?: Yes  Historical substance use preference: Alcohol/ETOH, Cocaine  History of Withdrawal Symptoms: Denies past symptoms  Is patient currently in treatment for substance abuse?: No. Patient declined treatment information.  Does patient have a history of Mental Health Diagnosis?: No         Means of Transportation  Means of Transport to Appts:: Family transport          DISCHARGE DETAILS:    Discharge planning discussed with:: Patient  Freedom of Choice: Yes  Comments - Freedom of Choice: Declining HOST- would like oPCM  CM contacted family/caregiver?: No- see comments (Declined)  Were Treatment Team discharge recommendations reviewed with patient/caregiver?: Yes  Did patient/caregiver verbalize understanding of patient care needs?: Yes  Were patient/caregiver advised of the risks associated with not following Treatment Team discharge recommendations?: Yes         Requested Home Health Care         Is the patient interested in HHC at discharge?: No    DME Referral Provided  Referral made for DME?: No    Other  Referral/Resources/Interventions Provided:  Interventions: Outpatient     Would you like to participate in our [facility] service program?  : No - Declined    Treatment Team Recommendation: Home  Discharge Destination Plan:: Home  Transport at Discharge : Family                 CM met with patient at bedside utilizing Telugu interpretor #[id number].  Patient resides with his parents in a second floor apartment. Patient was independent PTA, he does not utilize any DME.  Patient reports drinking alcohol daily, he is not interested in HOST or treatment at this time, feels he could stop if he wanted.  Patient reports he would like assistance applying for SSI, agreeable to OPCM referral.    CM sent referral for OPCM.  CM department will continue to follow.

## 2025-03-17 NOTE — ASSESSMENT & PLAN NOTE
Lab Results   Component Value Date    EGFR 40 03/17/2025    EGFR 66 03/16/2025    EGFR 54 03/16/2025    CREATININE 1.84 (H) 03/17/2025    CREATININE 1.21 03/16/2025    CREATININE 1.43 (H) 03/16/2025

## 2025-03-17 NOTE — PLAN OF CARE

## 2025-03-17 NOTE — RESTORATIVE TECHNICIAN NOTE
Restorative Technician Note      Patient Name: Wes Araujo     Restorative Tech Visit Date: 03/17/25  Note Type: Mobility  Patient Position Upon Consult: Supine  Activity Performed: Ambulated  Assistive Device: Other (Comment) (none)  Patient Position at End of Consult: All needs within reach; Supine            ns

## 2025-03-18 VITALS
BODY MASS INDEX: 19.41 KG/M2 | DIASTOLIC BLOOD PRESSURE: 67 MMHG | TEMPERATURE: 98.2 F | OXYGEN SATURATION: 98 % | SYSTOLIC BLOOD PRESSURE: 139 MMHG | WEIGHT: 131.06 LBS | HEIGHT: 69 IN | HEART RATE: 90 BPM | RESPIRATION RATE: 18 BRPM

## 2025-03-18 LAB
ANION GAP SERPL CALCULATED.3IONS-SCNC: 6 MMOL/L (ref 4–13)
BASOPHILS # BLD AUTO: 0.03 THOUSANDS/ÂΜL (ref 0–0.1)
BASOPHILS NFR BLD AUTO: 0 % (ref 0–1)
BUN SERPL-MCNC: 24 MG/DL (ref 5–25)
CALCIUM SERPL-MCNC: 7.8 MG/DL (ref 8.4–10.2)
CHLORIDE SERPL-SCNC: 104 MMOL/L (ref 96–108)
CO2 SERPL-SCNC: 22 MMOL/L (ref 21–32)
CREAT SERPL-MCNC: 1.9 MG/DL (ref 0.6–1.3)
EOSINOPHIL # BLD AUTO: 0.44 THOUSAND/ÂΜL (ref 0–0.61)
EOSINOPHIL NFR BLD AUTO: 4 % (ref 0–6)
ERYTHROCYTE [DISTWIDTH] IN BLOOD BY AUTOMATED COUNT: 12.7 % (ref 11.6–15.1)
GFR SERPL CREATININE-BSD FRML MDRD: 38 ML/MIN/1.73SQ M
GLUCOSE SERPL-MCNC: 117 MG/DL (ref 65–140)
GLUCOSE SERPL-MCNC: 250 MG/DL (ref 65–140)
GLUCOSE SERPL-MCNC: 317 MG/DL (ref 65–140)
HCT VFR BLD AUTO: 24 % (ref 36.5–49.3)
HGB BLD-MCNC: 8 G/DL (ref 12–17)
IMM GRANULOCYTES # BLD AUTO: 0.07 THOUSAND/UL (ref 0–0.2)
IMM GRANULOCYTES NFR BLD AUTO: 1 % (ref 0–2)
LYMPHOCYTES # BLD AUTO: 2.61 THOUSANDS/ÂΜL (ref 0.6–4.47)
LYMPHOCYTES NFR BLD AUTO: 23 % (ref 14–44)
MAGNESIUM SERPL-MCNC: 1.9 MG/DL (ref 1.9–2.7)
MCH RBC QN AUTO: 30.4 PG (ref 26.8–34.3)
MCHC RBC AUTO-ENTMCNC: 33.3 G/DL (ref 31.4–37.4)
MCV RBC AUTO: 91 FL (ref 82–98)
MONOCYTES # BLD AUTO: 0.83 THOUSAND/ÂΜL (ref 0.17–1.22)
MONOCYTES NFR BLD AUTO: 7 % (ref 4–12)
NEUTROPHILS # BLD AUTO: 7.18 THOUSANDS/ÂΜL (ref 1.85–7.62)
NEUTS SEG NFR BLD AUTO: 65 % (ref 43–75)
NRBC BLD AUTO-RTO: 0 /100 WBCS
PLATELET # BLD AUTO: 183 THOUSANDS/UL (ref 149–390)
PMV BLD AUTO: 11.4 FL (ref 8.9–12.7)
POTASSIUM SERPL-SCNC: 4.7 MMOL/L (ref 3.5–5.3)
RBC # BLD AUTO: 2.63 MILLION/UL (ref 3.88–5.62)
SODIUM SERPL-SCNC: 132 MMOL/L (ref 135–147)
WBC # BLD AUTO: 11.16 THOUSAND/UL (ref 4.31–10.16)

## 2025-03-18 PROCEDURE — 83735 ASSAY OF MAGNESIUM: CPT | Performed by: HOSPITALIST

## 2025-03-18 PROCEDURE — 99239 HOSP IP/OBS DSCHRG MGMT >30: CPT | Performed by: HOSPITALIST

## 2025-03-18 PROCEDURE — 80048 BASIC METABOLIC PNL TOTAL CA: CPT | Performed by: HOSPITALIST

## 2025-03-18 PROCEDURE — 82948 REAGENT STRIP/BLOOD GLUCOSE: CPT

## 2025-03-18 PROCEDURE — 85025 COMPLETE CBC W/AUTO DIFF WBC: CPT | Performed by: HOSPITALIST

## 2025-03-18 RX ORDER — BLOOD SUGAR DIAGNOSTIC
STRIP MISCELLANEOUS
Qty: 100 EACH | Refills: 0 | Status: ON HOLD | OUTPATIENT
Start: 2025-03-18

## 2025-03-18 RX ORDER — INSULIN HUMAN 100 [IU]/ML
5 INJECTION, SUSPENSION SUBCUTANEOUS
Qty: 10 ML | Refills: 1 | Status: ON HOLD | OUTPATIENT
Start: 2025-03-18

## 2025-03-18 RX ORDER — HYDRALAZINE HYDROCHLORIDE 25 MG/1
25 TABLET, FILM COATED ORAL EVERY 8 HOURS SCHEDULED
Qty: 180 TABLET | Refills: 0 | Status: ON HOLD | OUTPATIENT
Start: 2025-03-18 | End: 2025-05-17

## 2025-03-18 RX ORDER — GLUCOSAMINE HCL/CHONDROITIN SU 500-400 MG
CAPSULE ORAL
Qty: 100 EACH | Refills: 0 | Status: ON HOLD | OUTPATIENT
Start: 2025-03-18

## 2025-03-18 RX ORDER — BLOOD-GLUCOSE METER
KIT MISCELLANEOUS
Qty: 1 KIT | Refills: 0 | Status: ON HOLD | OUTPATIENT
Start: 2025-03-18

## 2025-03-18 RX ORDER — LANCETS 33 GAUGE
EACH MISCELLANEOUS
Qty: 100 EACH | Refills: 0 | Status: ON HOLD | OUTPATIENT
Start: 2025-03-18

## 2025-03-18 RX ORDER — PEN NEEDLE, DIABETIC 32GX 5/32"
NEEDLE, DISPOSABLE MISCELLANEOUS
Qty: 100 EACH | Refills: 0 | Status: SHIPPED | OUTPATIENT
Start: 2025-03-18 | End: 2025-03-18

## 2025-03-18 RX ORDER — ACYCLOVIR 800 MG/1
1 TABLET ORAL
Qty: 2 EACH | Refills: 0 | Status: SHIPPED | OUTPATIENT
Start: 2025-03-18 | End: 2025-03-18

## 2025-03-18 RX ORDER — AMLODIPINE BESYLATE 5 MG/1
5 TABLET ORAL 2 TIMES DAILY
Qty: 120 TABLET | Refills: 0 | Status: ON HOLD | OUTPATIENT
Start: 2025-03-18 | End: 2025-05-17

## 2025-03-18 RX ORDER — FOLIC ACID 1 MG/1
1 TABLET ORAL DAILY
Qty: 30 TABLET | Refills: 0 | Status: ON HOLD | OUTPATIENT
Start: 2025-03-18 | End: 2025-04-17

## 2025-03-18 RX ADMIN — Medication 50 MG: at 08:06

## 2025-03-18 RX ADMIN — INSULIN LISPRO 2 UNITS: 100 INJECTION, SOLUTION INTRAVENOUS; SUBCUTANEOUS at 08:06

## 2025-03-18 RX ADMIN — HEPARIN SODIUM 5000 UNITS: 5000 INJECTION INTRAVENOUS; SUBCUTANEOUS at 05:00

## 2025-03-18 RX ADMIN — PANCRELIPASE 24000 UNITS: 120000; 24000; 76000 CAPSULE, DELAYED RELEASE PELLETS ORAL at 12:53

## 2025-03-18 RX ADMIN — INSULIN HUMAN 4 UNITS: 100 INJECTION, SUSPENSION SUBCUTANEOUS at 08:06

## 2025-03-18 RX ADMIN — FOLIC ACID 1 MG: 1 TABLET ORAL at 08:06

## 2025-03-18 RX ADMIN — AMLODIPINE BESYLATE 5 MG: 5 TABLET ORAL at 08:06

## 2025-03-18 RX ADMIN — LABETALOL HYDROCHLORIDE 10 MG: 5 INJECTION, SOLUTION INTRAVENOUS at 12:53

## 2025-03-18 RX ADMIN — LABETALOL HYDROCHLORIDE 10 MG: 5 INJECTION, SOLUTION INTRAVENOUS at 05:00

## 2025-03-18 RX ADMIN — PANCRELIPASE 24000 UNITS: 120000; 24000; 76000 CAPSULE, DELAYED RELEASE PELLETS ORAL at 08:06

## 2025-03-18 NOTE — ASSESSMENT & PLAN NOTE
Due to taking insulin and then falling asleep and not eating    I stressed the importance of not taking insulin unless he is absolutely sure is going to eat.  I did decrease his NPH dose from 5 units twice daily to 4 units twice daily.    I also refilled all his medications and insulin supplies.

## 2025-03-18 NOTE — PLAN OF CARE

## 2025-03-18 NOTE — PLAN OF CARE
Problem: PAIN - ADULT  Goal: Verbalizes/displays adequate comfort level or baseline comfort level  Description: Interventions:  - Encourage patient to monitor pain and request assistance  - Assess pain using appropriate pain scale  - Administer analgesics based on type and severity of pain and evaluate response  - Implement non-pharmacological measures as appropriate and evaluate response  - Consider cultural and social influences on pain and pain management  - Notify physician/advanced practitioner if interventions unsuccessful or patient reports new pain  Outcome: Progressing     Problem: INFECTION - ADULT  Goal: Absence or prevention of progression during hospitalization  Description: INTERVENTIONS:  - Assess and monitor for signs and symptoms of infection  - Monitor lab/diagnostic results  - Monitor all insertion sites, i.e. indwelling lines, tubes, and drains  - Monitor endotracheal if appropriate and nasal secretions for changes in amount and color  - [place] appropriate cooling/warming therapies per order  - Administer medications as ordered  - Instruct and encourage patient and family to use good hand hygiene technique  - Identify and instruct in appropriate isolation precautions for identified infection/condition  Outcome: Progressing     Problem: Knowledge Deficit  Goal: Patient/family/caregiver demonstrates understanding of disease process, treatment plan, medications, and discharge instructions  Description: Complete learning assessment and assess knowledge base.  Interventions:  - Provide teaching at level of understanding  - Provide teaching via preferred learning methods  Outcome: Progressing

## 2025-03-18 NOTE — NURSING NOTE
IV removed prior to discharge. Discharge instructions reviewed with patient in Greenlandic. [facility] .

## 2025-03-18 NOTE — ASSESSMENT & PLAN NOTE
Lab Results   Component Value Date    EGFR 38 03/18/2025    EGFR 40 03/17/2025    EGFR 66 03/16/2025    CREATININE 1.90 (H) 03/18/2025    CREATININE 1.84 (H) 03/17/2025    CREATININE 1.21 03/16/2025

## 2025-03-18 NOTE — ASSESSMENT & PLAN NOTE
Lab Results   Component Value Date    HGBA1C 9.8 (H) 02/05/2025       Recent Labs     03/17/25  1554 03/17/25  2104 03/18/25  0724 03/18/25  1032   POCGLU 311* 129 250* 117       Blood Sugar Average: Last 72 hrs:  (P) 182.6511228074882797      Much better controlled.  I refilled his insulin and all his home medications.  He is okay to go home.

## 2025-03-18 NOTE — DISCHARGE SUMMARY
DIscharge Summary - Hospitalist   Name: Wes Araujo 55 y.o. male I MRN: [id number]  Unit/Bed#: E4 -01 I Date of Admission: 3/16/2025   Date of Service: 3/18/2025 I Hospital Day: 2    Assessment & Plan  Hypoglycemia  Due to taking insulin and then falling asleep and not eating    I stressed the importance of not taking insulin unless he is absolutely sure is going to eat.  I did decrease his NPH dose from 5 units twice daily to 4 units twice daily.    I also refilled all his medications and insulin supplies.  Primary hypertension    Type 2 diabetes mellitus with hypoglycemia, with long-term current use of insulin (HCC)  Lab Results   Component Value Date    HGBA1C 9.8 (H) 02/05/2025       Recent Labs     03/17/25  1554 03/17/25  2104 03/18/25  0724 03/18/25  1032   POCGLU 311* 129 250* 117       Blood Sugar Average: Last 72 hrs:  (P) 182.5817695779099034      Much better controlled.  I refilled his insulin and all his home medications.  He is okay to go home.  Paroxysmal A-fib (HCC)  Not on any outpatient meds for this  Chronic pancreatitis (HCC)    Metabolic acidosis    Hypothermia    CKD stage 3b, GFR 30-44 ml/min (HCC)  Lab Results   Component Value Date    EGFR 38 03/18/2025    EGFR 40 03/17/2025    EGFR 66 03/16/2025    CREATININE 1.90 (H) 03/18/2025    CREATININE 1.84 (H) 03/17/2025    CREATININE 1.21 03/16/2025         Medical Problems       Resolved Problems  Date Reviewed: 10/24/2024   None        Discharging Physician / Practitioner: Johny Sung DO  PCP: Rush Kebede MD  Admission Date:   Admission Orders (From admission, onward)       Ordered        03/16/25 0350  INPATIENT ADMISSION  Once                        Discharge Date: 03/18/25    Consultations During Hospital Stay:  IP CONSULT TO CASE MANAGEMENT  IP CONSULT TO ENDOCRINOLOGY     Procedures Performed:   * No surgery found *       Lab Results:   Results from last 7 days   Lab Units 03/18/25  0437 03/17/25  0435  "03/16/25  0537 03/16/25  0128 03/16/25  0120   WBC Thousand/uL 11.16* 15.76* 18.92*  --  12.80*   HEMOGLOBIN g/dL 8.0* 7.2* 7.9*  --  9.4*   HEMATOCRIT % 24.0* 20.8* 22.8*  --  28.7*   MCV fL 91 89 89  --  92   PLATELETS Thousands/uL 183 176 198  --  247   INR   --   --   --  1.11  --      Results from last 7 days   Lab Units 03/18/25  0437 03/17/25  0435 03/16/25  0537 03/16/25  0128   SODIUM mmol/L 132* 134* 138 139   POTASSIUM mmol/L 4.7 4.2 3.6 3.8   CHLORIDE mmol/L 104 108 113* 111*   CO2 mmol/L 22 22 22 19*   BUN mg/dL 24 17 15 15   CREATININE mg/dL 1.90* 1.84* 1.21 1.43*   CALCIUM mg/dL 7.8* 7.7* 7.9* 8.2*   ALBUMIN g/dL  --  2.6*  --  3.3*   TOTAL BILIRUBIN mg/dL  --  0.63  --  0.39   ALK PHOS U/L  --  104  --  123*   ALT U/L  --  51  --  61*   AST U/L  --  41*  --  46*   EGFR ml/min/1.73sq m 38 40 66 54   GLUCOSE RANDOM mg/dL 317* 259* 81 158*                   Reason for Admission:   Hypoglycemia - Symptomatic (BG 26 when found by EMS - given 1g glucagon mos recent BG 96)    Hospital Course:   Wes Araujo is a 55 y.o. male patient who originally presented to the hospital on 3/16/2025 due to severe hypoglycemia.  Patient is taken his insulin and then fell asleep before he could eat.  Came in obtunded admitted to the ICU.  Quickly improved on dextrose.  We stressed the importance of obviously making sure you are only taking insulin if you are going to eat.  He agreed and understands what went wrong.  I refilled all his medications and his glucose supplies        Please see above list of diagnoses and related plan for additional information.     Condition at Discharge: stable     Discharge Day Visit / Exam:   Subjective:  feels well  Eating and drinking well  .    Vitals: Blood Pressure: 139/67 (03/18/25 1030)  Pulse: 90 (03/18/25 1030)  Temperature: 98.2 °F (36.8 °C) (03/18/25 1030)  Temp Source: Temporal (03/18/25 1030)  Respirations: 18 (03/18/25 1030)  Height: 5' 9\" (175.3 cm) (03/16/25 0553)  Weight - " Scale: 59.5 kg (131 lb 1 oz) (03/18/25 0600)  SpO2: 98 % (03/18/25 1030)    Exam:   Physical Exam  Vitals and nursing note reviewed.   Constitutional:       General: He is not in acute distress.     Appearance: He is well-developed.   HENT:      Head: Normocephalic and atraumatic.   Eyes:      Conjunctiva/sclera: Conjunctivae normal.   Cardiovascular:      Rate and Rhythm: Normal rate and regular rhythm.      Heart sounds: No murmur heard.  Pulmonary:      Effort: Pulmonary effort is normal. No respiratory distress.      Breath sounds: Normal breath sounds.   Abdominal:      Palpations: Abdomen is soft.      Tenderness: There is no abdominal tenderness.   Musculoskeletal:         General: No swelling.      Cervical back: Neck supple.      Right lower leg: No edema.      Left lower leg: No edema.   Skin:     General: Skin is warm and dry.      Capillary Refill: Capillary refill takes less than 2 seconds.   Neurological:      Mental Status: He is alert.   Psychiatric:         Mood and Affect: Mood normal.           Discharge instructions/Information to patient and family:   See after visit summary for information provided to patient and family.      Provisions for Follow-Up Care:  See after visit summary for information related to follow-up care and any pertinent home health orders.        Disposition:   Home       Discharge Medications:  See after visit summary for reconciled discharge medications provided to patient and family.      Administrative Statements   I spent 38 minutes discharging the patient. This time was spent on the day of discharge. I had direct contact with the patient on the day of discharge. Greater than 50% of the total time was spent examining patient, answering all patient questions, arranging and discussing plan of care with patient as well as directly providing post-discharge instructions.  Additional time then spent on discharge activities.    **Please Note: This note may have been  constructed using a voice recognition system**

## 2025-03-19 NOTE — UTILIZATION REVIEW
NOTIFICATION OF ADMISSION DISCHARGE   This is a Notification of Discharge from [facility]. Please be advised that this patient has been discharge from our facility. Below you will find the admission and discharge date and time including the patient’s disposition.   UTILIZATION REVIEW CONTACT:  Katie Almeida MA  Utilization   Network Utilization Review Department  Phone: [phone] x carefully listen to the prompts. All voicemails are confidential.  Email: [email or web address]     ADMISSION INFORMATION  PRESENTATION DATE: 3/16/2025 12:53 AM  OBERVATION ADMISSION DATE: N/A  INPATIENT ADMISSION DATE: 3/16/25  3:50 AM   DISCHARGE DATE: 3/18/2025  1:56 PM   DISPOSITION:Home/Self Care    Network Utilization Review Department  ATTENTION: Please call with any questions or concerns to [phone] and carefully listen to the prompts so that you are directed to the right person. All voicemails are confidential.   For Discharge needs, contact Care Management DC Support Team at [phone] opt. 2  Send all requests for admission clinical reviews, approved or denied determinations and any other requests to dedicated fax number below belonging to the campus where the patient is receiving treatment. List of dedicated fax numbers for the Facilities:  FACILITY NAME UR FAX NUMBER   ADMISSION DENIALS (Administrative/Medical Necessity) [phone]   DISCHARGE SUPPORT TEAM ([facility]) [phone]   PARENT CHILD HEALTH (Maternity/NICU/Pediatrics) [phone]   [facility] [phone]   [facility] [phone]   [facility] [phone]   [facility] [phone]   [facility] [phone]   [facility] [phone]   [facility] [phone]   [facility]  [phone]   [facility] [phone]   [facility] [phone]   [facility] [phone]   [facility] [phone]

## 2025-03-20 ENCOUNTER — TRANSITIONAL CARE MANAGEMENT (OUTPATIENT)
Dept: FAMILY MEDICINE CLINIC | Facility: CLINIC | Age: 55
End: 2025-03-20

## 2025-03-20 ENCOUNTER — PATIENT OUTREACH (OUTPATIENT)
Dept: FAMILY MEDICINE CLINIC | Facility: CLINIC | Age: 55
End: 2025-03-20

## 2025-03-20 NOTE — PROGRESS NOTES
BASHIR Cummings received referral from inpatient CM to assist with community resources and applying for SSI/SSDI.    BASHIR CM placed call to the patientWes utilizing [place] interpretor #[id number] and left message. BASHIR CUMMINGS will await return call to provide resources and psychosocial support as needed.

## 2025-03-21 LAB
BACTERIA BLD CULT: NORMAL
BACTERIA BLD CULT: NORMAL

## 2025-03-25 ENCOUNTER — PATIENT OUTREACH (OUTPATIENT)
Dept: FAMILY MEDICINE CLINIC | Facility: CLINIC | Age: 55
End: 2025-03-25

## 2025-03-25 NOTE — PROGRESS NOTES
BASHIR CUMMINGS placed second call to the patient, Wes utilizing [employer] Croatian interpretor #[id number] and spoke with his father. BASHIR CUMMINGS requested to speak with patient and the call disconnected. BASHIR CUMMINGS will attempt another outreach to provide resources and psychosocial support as needed.

## 2025-03-27 LAB — INSULIN AB SER-ACNC: 8.4 UU/ML

## 2025-03-28 ENCOUNTER — PATIENT OUTREACH (OUTPATIENT)
Dept: FAMILY MEDICINE CLINIC | Facility: CLINIC | Age: 55
End: 2025-03-28

## 2025-03-28 NOTE — PROGRESS NOTES
BASHIR CUMMINGS placed additional call to the patientWes utilizing [place] interpretor #[id number] and left message. BASHIR CUMMINGS will await return call to provide resources and psychosocial support as needed.

## 2025-04-04 ENCOUNTER — PATIENT OUTREACH (OUTPATIENT)
Dept: FAMILY MEDICINE CLINIC | Facility: CLINIC | Age: 55
End: 2025-04-04

## 2025-04-04 NOTE — LETTER
04/04/25    Estimado/a Noah Driscoll un coordinador de la atención médica en [facility]. Intentamos comunicarnos con usted por teléfono varias veces. Es importante que se comunique con nosotros al [phone] para que podamos ofrecerle ayuda con philly necesidades de atención médica.     Atentamente,         Melissa Albercht  [employer]

## 2025-04-04 NOTE — PROGRESS NOTES
BASHIR CUMMINGS placed additional follow up call to the patient, Wes utilizing [employer] interpretor #[id number] and his father answered indicating that Wes was not available. BASHIR CUMMINGS can callback at a later time.

## 2025-04-04 NOTE — PROGRESS NOTES
L/m for   Cervical cancer screen  Colorectal cancer screen   BASHIR CUMMINGS placed additional call as requested to the patient, Wes utilizing [employer] interpretor #[id number] and a woman answered advising Wes is still not available. BASHIR CUMMINGS attempted to provide contact information but call disconnected.     BASHIR CUMMINGS will mail Unable to Reach letter and close referral due to no response from patient. BASHIR CM will remain available for psychosocial support as needed.

## 2025-04-26 ENCOUNTER — APPOINTMENT (EMERGENCY)
Dept: RADIOLOGY | Facility: HOSPITAL | Age: 55
DRG: 251 | End: 2025-04-26
Payer: COMMERCIAL

## 2025-04-26 ENCOUNTER — APPOINTMENT (EMERGENCY)
Dept: CT IMAGING | Facility: HOSPITAL | Age: 55
DRG: 251 | End: 2025-04-26
Payer: COMMERCIAL

## 2025-04-26 ENCOUNTER — APPOINTMENT (EMERGENCY)
Dept: ULTRASOUND IMAGING | Facility: HOSPITAL | Age: 55
DRG: 251 | End: 2025-04-26
Payer: COMMERCIAL

## 2025-04-26 ENCOUNTER — HOSPITAL ENCOUNTER (INPATIENT)
Facility: HOSPITAL | Age: 55
LOS: 4 days | Discharge: LEFT BEFORE DISCHARGE | DRG: 251 | End: 2025-04-30
Attending: EMERGENCY MEDICINE | Admitting: INTERNAL MEDICINE
Payer: COMMERCIAL

## 2025-04-26 DIAGNOSIS — K86.0 ALCOHOL-INDUCED CHRONIC PANCREATITIS (HCC): ICD-10-CM

## 2025-04-26 DIAGNOSIS — R74.8 ELEVATED ALKALINE PHOSPHATASE LEVEL: ICD-10-CM

## 2025-04-26 DIAGNOSIS — R10.9 ABDOMINAL PAIN: ICD-10-CM

## 2025-04-26 DIAGNOSIS — R17 SERUM TOTAL BILIRUBIN ELEVATED: ICD-10-CM

## 2025-04-26 DIAGNOSIS — R73.9 HYPERGLYCEMIA: ICD-10-CM

## 2025-04-26 DIAGNOSIS — R74.01 TRANSAMINITIS: Primary | ICD-10-CM

## 2025-04-26 DIAGNOSIS — N17.9 AKI (ACUTE KIDNEY INJURY) (HCC): ICD-10-CM

## 2025-04-26 PROBLEM — M54.50 CHRONIC LOW BACK PAIN: Status: RESOLVED | Noted: 2023-01-31 | Resolved: 2025-04-26

## 2025-04-26 PROBLEM — E11.9 TYPE 2 DIABETES MELLITUS (HCC): Status: RESOLVED | Noted: 2024-01-25 | Resolved: 2025-04-26

## 2025-04-26 PROBLEM — N18.30 ACUTE RENAL FAILURE SUPERIMPOSED ON STAGE 3 CHRONIC KIDNEY DISEASE (HCC): Status: RESOLVED | Noted: 2019-02-26 | Resolved: 2025-04-26

## 2025-04-26 PROBLEM — F10.139 ALCOHOL ABUSE WITH WITHDRAWAL (HCC): Status: RESOLVED | Noted: 2024-01-25 | Resolved: 2025-04-26

## 2025-04-26 PROBLEM — R65.10 SIRS (SYSTEMIC INFLAMMATORY RESPONSE SYNDROME) (HCC): Status: RESOLVED | Noted: 2024-02-02 | Resolved: 2025-04-26

## 2025-04-26 PROBLEM — M54.50 ACUTE LOW BACK PAIN: Status: RESOLVED | Noted: 2024-03-10 | Resolved: 2025-04-26

## 2025-04-26 PROBLEM — R93.89 ABNORMAL CHEST X-RAY: Status: RESOLVED | Noted: 2022-07-29 | Resolved: 2025-04-26

## 2025-04-26 PROBLEM — R63.4 UNINTENTIONAL WEIGHT LOSS: Status: RESOLVED | Noted: 2025-02-08 | Resolved: 2025-04-26

## 2025-04-26 PROBLEM — E11.00 HYPEROSMOLAR HYPERGLYCEMIC STATE (HHS) (HCC): Status: RESOLVED | Noted: 2024-01-25 | Resolved: 2025-04-26

## 2025-04-26 PROBLEM — R79.89 ELEVATED BRAIN NATRIURETIC PEPTIDE (BNP) LEVEL: Status: RESOLVED | Noted: 2024-01-25 | Resolved: 2025-04-26

## 2025-04-26 PROBLEM — F14.90 COCAINE USE: Status: RESOLVED | Noted: 2022-09-04 | Resolved: 2025-04-26

## 2025-04-26 PROBLEM — E87.6 HYPOKALEMIA: Status: RESOLVED | Noted: 2020-03-22 | Resolved: 2025-04-26

## 2025-04-26 PROBLEM — G93.41 ACUTE METABOLIC ENCEPHALOPATHY: Status: RESOLVED | Noted: 2025-02-05 | Resolved: 2025-04-26

## 2025-04-26 PROBLEM — T68.XXXA HYPOTHERMIA: Chronic | Status: RESOLVED | Noted: 2022-11-22 | Resolved: 2025-04-26

## 2025-04-26 PROBLEM — E16.2 HYPOGLYCEMIA: Status: RESOLVED | Noted: 2020-03-22 | Resolved: 2025-04-26

## 2025-04-26 PROBLEM — N18.31 CKD STAGE 3A, GFR 45-59 ML/MIN (HCC): Status: ACTIVE | Noted: 2025-02-05

## 2025-04-26 PROBLEM — G93.40 ACUTE ENCEPHALOPATHY: Status: RESOLVED | Noted: 2019-02-04 | Resolved: 2025-04-26

## 2025-04-26 PROBLEM — F10.939 ALCOHOL WITHDRAWAL (HCC): Status: RESOLVED | Noted: 2023-01-29 | Resolved: 2025-04-26

## 2025-04-26 PROBLEM — D72.829 LEUKOCYTOSIS: Status: RESOLVED | Noted: 2022-11-22 | Resolved: 2025-04-26

## 2025-04-26 PROBLEM — E87.20 METABOLIC ACIDOSIS: Status: RESOLVED | Noted: 2019-02-03 | Resolved: 2025-04-26

## 2025-04-26 PROBLEM — R19.7 DIARRHEA: Status: RESOLVED | Noted: 2023-01-01 | Resolved: 2025-04-26

## 2025-04-26 PROBLEM — F19.90 ILLICIT DRUG USE: Status: RESOLVED | Noted: 2022-12-08 | Resolved: 2025-04-26

## 2025-04-26 PROBLEM — G89.29 CHRONIC LOW BACK PAIN: Status: RESOLVED | Noted: 2023-01-31 | Resolved: 2025-04-26

## 2025-04-26 LAB
2HR DELTA HS TROPONIN: 0 NG/L
ALBUMIN SERPL BCG-MCNC: 2.8 G/DL (ref 3.5–5)
ALP SERPL-CCNC: 639 U/L (ref 34–104)
ALT SERPL W P-5'-P-CCNC: 60 U/L (ref 7–52)
ANION GAP SERPL CALCULATED.3IONS-SCNC: 8 MMOL/L (ref 4–13)
APAP SERPL-MCNC: <2 UG/ML (ref 10–20)
AST SERPL W P-5'-P-CCNC: 81 U/L (ref 13–39)
B-OH-BUTYR SERPL-MCNC: <0.05 MMOL/L (ref 0.02–0.27)
BASE EX.OXY STD BLDV CALC-SCNC: 88 % (ref 60–80)
BASE EXCESS BLDV CALC-SCNC: -2.3 MMOL/L
BASOPHILS # BLD AUTO: 0.05 THOUSANDS/ÂΜL (ref 0–0.1)
BASOPHILS NFR BLD AUTO: 1 % (ref 0–1)
BILIRUB SERPL-MCNC: 1.89 MG/DL (ref 0.2–1)
BNP SERPL-MCNC: 137 PG/ML (ref 0–100)
BUN SERPL-MCNC: 12 MG/DL (ref 5–25)
CALCIUM ALBUM COR SERPL-MCNC: 9.3 MG/DL (ref 8.3–10.1)
CALCIUM SERPL-MCNC: 8.3 MG/DL (ref 8.4–10.2)
CARDIAC TROPONIN I PNL SERPL HS: 11 NG/L (ref ?–50)
CARDIAC TROPONIN I PNL SERPL HS: 11 NG/L (ref ?–50)
CHLORIDE SERPL-SCNC: 100 MMOL/L (ref 96–108)
CO2 SERPL-SCNC: 19 MMOL/L (ref 21–32)
CREAT SERPL-MCNC: 1.52 MG/DL (ref 0.6–1.3)
EOSINOPHIL # BLD AUTO: 0.46 THOUSAND/ÂΜL (ref 0–0.61)
EOSINOPHIL NFR BLD AUTO: 5 % (ref 0–6)
ERYTHROCYTE [DISTWIDTH] IN BLOOD BY AUTOMATED COUNT: 12.2 % (ref 11.6–15.1)
ETHANOL SERPL-MCNC: <10 MG/DL
GFR SERPL CREATININE-BSD FRML MDRD: 50 ML/MIN/1.73SQ M
GLUCOSE SERPL-MCNC: 344 MG/DL (ref 65–140)
GLUCOSE SERPL-MCNC: 388 MG/DL (ref 65–140)
GLUCOSE SERPL-MCNC: 443 MG/DL (ref 65–140)
GLUCOSE SERPL-MCNC: 489 MG/DL (ref 65–140)
HCO3 BLDV-SCNC: 18.6 MMOL/L (ref 24–30)
HCT VFR BLD AUTO: 30.9 % (ref 36.5–49.3)
HGB BLD-MCNC: 11 G/DL (ref 12–17)
IMM GRANULOCYTES # BLD AUTO: 0.04 THOUSAND/UL (ref 0–0.2)
IMM GRANULOCYTES NFR BLD AUTO: 0 % (ref 0–2)
LACTATE SERPL-SCNC: 1.2 MMOL/L (ref 0.5–2)
LACTATE SERPL-SCNC: 2.1 MMOL/L (ref 0.5–2)
LIPASE SERPL-CCNC: 14 U/L (ref 11–82)
LYMPHOCYTES # BLD AUTO: 2.43 THOUSANDS/ÂΜL (ref 0.6–4.47)
LYMPHOCYTES NFR BLD AUTO: 24 % (ref 14–44)
MCH RBC QN AUTO: 30.8 PG (ref 26.8–34.3)
MCHC RBC AUTO-ENTMCNC: 35.6 G/DL (ref 31.4–37.4)
MCV RBC AUTO: 87 FL (ref 82–98)
MONOCYTES # BLD AUTO: 0.79 THOUSAND/ÂΜL (ref 0.17–1.22)
MONOCYTES NFR BLD AUTO: 8 % (ref 4–12)
NEUTROPHILS # BLD AUTO: 6.43 THOUSANDS/ÂΜL (ref 1.85–7.62)
NEUTS SEG NFR BLD AUTO: 62 % (ref 43–75)
NRBC BLD AUTO-RTO: 0 /100 WBCS
O2 CT BLDV-SCNC: 14.5 ML/DL
PCO2 BLDV: 22.3 MM HG (ref 42–50)
PH BLDV: 7.54 [PH] (ref 7.3–7.4)
PLATELET # BLD AUTO: 235 THOUSANDS/UL (ref 149–390)
PMV BLD AUTO: 12.2 FL (ref 8.9–12.7)
PO2 BLDV: 49.2 MM HG (ref 35–45)
POTASSIUM SERPL-SCNC: 3.7 MMOL/L (ref 3.5–5.3)
PROT SERPL-MCNC: 6 G/DL (ref 6.4–8.4)
RBC # BLD AUTO: 3.57 MILLION/UL (ref 3.88–5.62)
SALICYLATES SERPL-MCNC: <5 MG/DL (ref 3–20)
SODIUM SERPL-SCNC: 127 MMOL/L (ref 135–147)
WBC # BLD AUTO: 10.2 THOUSAND/UL (ref 4.31–10.16)

## 2025-04-26 PROCEDURE — 82948 REAGENT STRIP/BLOOD GLUCOSE: CPT

## 2025-04-26 PROCEDURE — 36415 COLL VENOUS BLD VENIPUNCTURE: CPT

## 2025-04-26 PROCEDURE — 80053 COMPREHEN METABOLIC PANEL: CPT

## 2025-04-26 PROCEDURE — 82977 ASSAY OF GGT: CPT | Performed by: INTERNAL MEDICINE

## 2025-04-26 PROCEDURE — 85025 COMPLETE CBC W/AUTO DIFF WBC: CPT

## 2025-04-26 PROCEDURE — 83690 ASSAY OF LIPASE: CPT

## 2025-04-26 PROCEDURE — 96365 THER/PROPH/DIAG IV INF INIT: CPT

## 2025-04-26 PROCEDURE — 84484 ASSAY OF TROPONIN QUANT: CPT

## 2025-04-26 PROCEDURE — 93005 ELECTROCARDIOGRAM TRACING: CPT

## 2025-04-26 PROCEDURE — 83605 ASSAY OF LACTIC ACID: CPT

## 2025-04-26 PROCEDURE — 96375 TX/PRO/DX INJ NEW DRUG ADDON: CPT

## 2025-04-26 PROCEDURE — 99285 EMERGENCY DEPT VISIT HI MDM: CPT | Performed by: EMERGENCY MEDICINE

## 2025-04-26 PROCEDURE — 82010 KETONE BODYS QUAN: CPT

## 2025-04-26 PROCEDURE — 82077 ASSAY SPEC XCP UR&BREATH IA: CPT

## 2025-04-26 PROCEDURE — 76705 ECHO EXAM OF ABDOMEN: CPT

## 2025-04-26 PROCEDURE — 80143 DRUG ASSAY ACETAMINOPHEN: CPT

## 2025-04-26 PROCEDURE — 82805 BLOOD GASES W/O2 SATURATION: CPT

## 2025-04-26 PROCEDURE — 99285 EMERGENCY DEPT VISIT HI MDM: CPT

## 2025-04-26 PROCEDURE — 96361 HYDRATE IV INFUSION ADD-ON: CPT

## 2025-04-26 PROCEDURE — 80179 DRUG ASSAY SALICYLATE: CPT

## 2025-04-26 PROCEDURE — 71046 X-RAY EXAM CHEST 2 VIEWS: CPT

## 2025-04-26 PROCEDURE — 99223 1ST HOSP IP/OBS HIGH 75: CPT

## 2025-04-26 PROCEDURE — 83880 ASSAY OF NATRIURETIC PEPTIDE: CPT

## 2025-04-26 PROCEDURE — 74177 CT ABD & PELVIS W/CONTRAST: CPT

## 2025-04-26 RX ORDER — ACETAMINOPHEN 325 MG/1
975 TABLET ORAL EVERY 8 HOURS PRN
Status: DISCONTINUED | OUTPATIENT
Start: 2025-04-26 | End: 2025-04-30 | Stop reason: HOSPADM

## 2025-04-26 RX ORDER — AMLODIPINE BESYLATE 5 MG/1
5 TABLET ORAL 2 TIMES DAILY
Status: DISCONTINUED | OUTPATIENT
Start: 2025-04-27 | End: 2025-04-30

## 2025-04-26 RX ORDER — POLYETHYLENE GLYCOL 3350 17 G/17G
17 POWDER, FOR SOLUTION ORAL DAILY PRN
Status: DISCONTINUED | OUTPATIENT
Start: 2025-04-26 | End: 2025-04-29

## 2025-04-26 RX ORDER — HEPARIN SODIUM 5000 [USP'U]/ML
5000 INJECTION, SOLUTION INTRAVENOUS; SUBCUTANEOUS EVERY 8 HOURS SCHEDULED
Status: DISCONTINUED | OUTPATIENT
Start: 2025-04-27 | End: 2025-04-30 | Stop reason: HOSPADM

## 2025-04-26 RX ORDER — POLYETHYLENE GLYCOL 3350 17 G/17G
17 POWDER, FOR SOLUTION ORAL DAILY
Status: DISCONTINUED | OUTPATIENT
Start: 2025-04-27 | End: 2025-04-26

## 2025-04-26 RX ORDER — AMLODIPINE BESYLATE 5 MG/1
5 TABLET ORAL ONCE
Status: COMPLETED | OUTPATIENT
Start: 2025-04-26 | End: 2025-04-26

## 2025-04-26 RX ORDER — ONDANSETRON 2 MG/ML
4 INJECTION INTRAMUSCULAR; INTRAVENOUS EVERY 6 HOURS PRN
Status: DISCONTINUED | OUTPATIENT
Start: 2025-04-26 | End: 2025-04-30 | Stop reason: HOSPADM

## 2025-04-26 RX ORDER — OXYCODONE HYDROCHLORIDE 5 MG/1
5 TABLET ORAL EVERY 6 HOURS PRN
Refills: 0 | Status: DISCONTINUED | OUTPATIENT
Start: 2025-04-26 | End: 2025-04-30 | Stop reason: HOSPADM

## 2025-04-26 RX ORDER — HYDROMORPHONE HCL IN WATER/PF 6 MG/30 ML
0.2 PATIENT CONTROLLED ANALGESIA SYRINGE INTRAVENOUS EVERY 6 HOURS PRN
Refills: 0 | Status: DISCONTINUED | OUTPATIENT
Start: 2025-04-26 | End: 2025-04-30 | Stop reason: HOSPADM

## 2025-04-26 RX ORDER — INSULIN ASPART 100 [IU]/ML
4 INJECTION, SUSPENSION SUBCUTANEOUS
Status: DISCONTINUED | OUTPATIENT
Start: 2025-04-27 | End: 2025-04-30 | Stop reason: HOSPADM

## 2025-04-26 RX ORDER — ENOXAPARIN SODIUM 100 MG/ML
40 INJECTION SUBCUTANEOUS DAILY
Status: DISCONTINUED | OUTPATIENT
Start: 2025-04-27 | End: 2025-04-26

## 2025-04-26 RX ORDER — ACETAMINOPHEN 325 MG/1
650 TABLET ORAL ONCE
Status: COMPLETED | OUTPATIENT
Start: 2025-04-26 | End: 2025-04-26

## 2025-04-26 RX ORDER — HYDRALAZINE HYDROCHLORIDE 25 MG/1
25 TABLET, FILM COATED ORAL EVERY 8 HOURS SCHEDULED
Status: DISCONTINUED | OUTPATIENT
Start: 2025-04-26 | End: 2025-04-30

## 2025-04-26 RX ORDER — MAGNESIUM HYDROXIDE/ALUMINUM HYDROXICE/SIMETHICONE 120; 1200; 1200 MG/30ML; MG/30ML; MG/30ML
30 SUSPENSION ORAL EVERY 6 HOURS PRN
Status: DISCONTINUED | OUTPATIENT
Start: 2025-04-26 | End: 2025-04-30 | Stop reason: HOSPADM

## 2025-04-26 RX ORDER — INSULIN LISPRO 100 [IU]/ML
1-5 INJECTION, SOLUTION INTRAVENOUS; SUBCUTANEOUS
Status: DISCONTINUED | OUTPATIENT
Start: 2025-04-26 | End: 2025-04-30 | Stop reason: HOSPADM

## 2025-04-26 RX ORDER — ONDANSETRON 2 MG/ML
4 INJECTION INTRAMUSCULAR; INTRAVENOUS ONCE
Status: COMPLETED | OUTPATIENT
Start: 2025-04-26 | End: 2025-04-26

## 2025-04-26 RX ORDER — HYDROMORPHONE HCL/PF 1 MG/ML
0.5 SYRINGE (ML) INJECTION ONCE
Status: COMPLETED | OUTPATIENT
Start: 2025-04-26 | End: 2025-04-26

## 2025-04-26 RX ORDER — HYDRALAZINE HYDROCHLORIDE 25 MG/1
25 TABLET, FILM COATED ORAL ONCE
Status: COMPLETED | OUTPATIENT
Start: 2025-04-26 | End: 2025-04-26

## 2025-04-26 RX ADMIN — HYDRALAZINE HYDROCHLORIDE 25 MG: 25 TABLET ORAL at 16:56

## 2025-04-26 RX ADMIN — CEFTRIAXONE 2000 MG: 10 INJECTION, POWDER, FOR SOLUTION INTRAVENOUS at 18:43

## 2025-04-26 RX ADMIN — AMLODIPINE BESYLATE 5 MG: 5 TABLET ORAL at 16:56

## 2025-04-26 RX ADMIN — IOHEXOL 100 ML: 350 INJECTION, SOLUTION INTRAVENOUS at 18:18

## 2025-04-26 RX ADMIN — INSULIN LISPRO 4 UNITS: 100 INJECTION, SOLUTION INTRAVENOUS; SUBCUTANEOUS at 23:32

## 2025-04-26 RX ADMIN — HYDROMORPHONE HYDROCHLORIDE 0.5 MG: 1 INJECTION, SOLUTION INTRAMUSCULAR; INTRAVENOUS; SUBCUTANEOUS at 18:40

## 2025-04-26 RX ADMIN — ONDANSETRON 4 MG: 2 INJECTION INTRAMUSCULAR; INTRAVENOUS at 17:00

## 2025-04-26 RX ADMIN — ACETAMINOPHEN 650 MG: 325 TABLET, FILM COATED ORAL at 18:39

## 2025-04-26 RX ADMIN — HYDRALAZINE HYDROCHLORIDE 25 MG: 25 TABLET ORAL at 23:25

## 2025-04-26 RX ADMIN — SODIUM CHLORIDE 500 ML: 0.9 INJECTION, SOLUTION INTRAVENOUS at 18:44

## 2025-04-26 RX ADMIN — SODIUM CHLORIDE 500 ML: 0.9 INJECTION, SOLUTION INTRAVENOUS at 17:02

## 2025-04-26 NOTE — Clinical Note
Case was discussed with Jonathan and the patient's admission status was agreed to be Admission Status: inpatient status to the service of Dr. Franks .

## 2025-04-26 NOTE — ED PROVIDER NOTES
Time reflects when diagnosis was documented in both MDM as applicable and the Disposition within this note       Time User Action Codes Description Comment    4/26/2025  8:47 PM Lacho, Flores Add [R74.01] Transaminitis     4/26/2025  8:48 PM Lacho, Flores Add [R74.8] Elevated alkaline phosphatase level     4/26/2025  8:48 PM Lacho, Flores Add [R17] Serum total bilirubin elevated     4/26/2025  8:48 PM Lacho, Flores Add [R10.9] Abdominal pain     4/26/2025  8:48 PM Lacho, Flores Add [R73.9] Hyperglycemia           ED Disposition       ED Disposition   Admit    Condition   Stable    Date/Time   Sat Apr 26, 2025  9:24 PM    Comment   Case was discussed with Samy Castillo and the patient's admission status was agreed to be Admission Status: inpatient status to the service of Dr. Orozco .               Assessment & Plan       Medical Decision Making  Amount and/or Complexity of Data Reviewed  Labs: ordered. Decision-making details documented in ED Course.  Radiology: ordered and independent interpretation performed. Decision-making details documented in ED Course.  ECG/medicine tests:  Decision-making details documented in ED Course.    Risk  OTC drugs.  Prescription drug management.  Decision regarding hospitalization.      Patient is a 55 y.o. male  PMH insulin dependent T2DM, PAF, Seizures who presents to the ED with CC N/V/D, body aches, headache, dizziness . Patient hard of hearing and poor historian. Attempted to use certified  on Ipad to translate, but patient unable to hear well enough for this. Translated using nursing staff.     Abdominal pain last 10 days with N/V/D and abdominal distention. Unclear if sick contacts (patient not answering appropriately). Denies fevers. Last bowel movement this morning which was diarrheal. Hasn't taken insulin for three days. Doesn't take any oral agents. Diarrhea is non-bloody; just watery. Typically takes blood pressure medication but did not take it today, but he took  it yesterday.  Did fall 12 days ago and had some back pain; did not hit his head when he fell. No AC/AP. .     Exam mild abdominal distension with diffuse abdominal ttp; rhonchi RLL     Vital signs significant for HTN - did not take home antihypertensives today and patient currently in pain. Exam as listed below.    Differential diagnosis includes but is not limited to DKA, HHS, electrolyte derangement, viral gastroenteritis, pancreatitis, cholecystitis,  ACS, CHF, other intraabdominal infection, ascites, possibly liver failure     Plan CBC, CMP, lipase, VBG, lactic acid, beta-hydroxybutyrate, troponin, BNP, CT AP with IV contrast.   - VBG with metabolic alkalosis; coma panel to r/o salicylates      View ED course above for further discussion on patient workup.    All labs reviewed and utilized in the medical decision making process  All radiology studies independently viewed by me and interpreted by the radiologist.  I reviewed all testing with the patient.     Upon re-evaluation patient with transaminitis and cholestatic pattern. CT and RUQ US with gallbladder wall thickening but less likely cholecystitis. Will admit for further evaluation of laboratory abnormalities. Received rocephin empirically for suspected cholecystitis prior to imaging results.     ED Course as of 04/27/25 0015   Sat Apr 26, 2025   1619 POC Glucose(!!): 443   1620 ECG 12 lead  Procedure Note: EKG  Date/Time: 04/26/25 4:20 PM   Interpreted by: JESSICA APARICIO  Indications / Diagnosis: hyperglycemia  ECG reviewed by me, the ED Provider: yes   The EKG demonstrates: normal EKG, normal sinus rhythm, nonspecific ST and T waves changes  Rhythm: normal sinus  Intervals: normal intervals  Axis: normal axis  QRS/Blocks: normal QRS  ST Changes: No acute ST Changes, no STD/KERRI.     1641 Ordered home amlodipine and hydralazine PO as patient hypertensive and did not take oral meds this morning    1712 WBC(!): 10.20   1712 Hemoglobin(!): 11.0   1719 LACTIC  "ACID(!): 2.1  Very mild elevation; will reflex to 2h.    1722 pH, Sami(!): 7.539  Primary respiratory alkalosis with PCO2 22; however patient does not appear tachypneic on exam and no tachypnea per vitals listed    1726 Appears primary resp acidosis with metabolic acidosis. Patient denied an ASA use in the room, however will do coma panel to assess for salicylate levels    1727 hs TnI 0hr: 11   1801 LIPASE: 14   1801 Beta- Hydroxybutyrate: <0.05   1801 Labs not consistent with DKA    1816 Comprehensive metabolic panel(!!)  Cholestatic pattern concerning for biliary pathology, possibly cholecystitis. Additional elevated glucose. Na corrected for hyperglycemia is 133    1820 Coma Panel(!)  Negative    1821 B-Type Natriuretic Peptide(BNP)(!)  Previous one year ago ~400    1833 POC Glucose(!): 388  Glucose improved to 388 from 489 after 500cc IV fluids. Will give additional 500cc    1912 CT abdomen pelvis with contrast  \"Mild mesenteric edema and small volume ascites. Small left pleural effusion.     Stable findings of chronic pancreatitis.     \"    1914 LACTIC ACID: 1.2   1923 CT abdomen pelvis with contrast  Discussed read with radiology who said acalculous cholecystitis can not be ruled out as ascitics is limiting CT interpretation of the gallbladder. Recommended RUQ US    1928 RUQ US ordered and radiology tech on call messaged over epic chat    2047 US right upper quadrant  \"Gallbladder wall thickening in the setting of ascites. Findings most likely reflects the presence of ascites, underlying liver disease, heart failure or hypoproteinemia. Acute cholecystitis is considered less likely.  Moderate ascites.     \"    2124 Patient admitted to medicine for laboratory abnormalities.        Medications   sodium chloride 0.9 % bolus 500 mL (0 mL Intravenous Stopped 4/26/25 1850)   amLODIPine (NORVASC) tablet 5 mg (5 mg Oral Given 4/26/25 1656)   ondansetron (ZOFRAN) injection 4 mg (4 mg Intravenous Given 4/26/25 1700) "   hydrALAZINE (APRESOLINE) tablet 25 mg (25 mg Oral Given 4/26/25 1656)   iohexol (OMNIPAQUE) 350 MG/ML injection (MULTI-DOSE) 100 mL (100 mL Intravenous Given 4/26/25 1818)   ceftriaxone (ROCEPHIN) 2 g/50 mL in dextrose IVPB (0 mg Intravenous Stopped 4/26/25 1913)   acetaminophen (TYLENOL) tablet 650 mg (650 mg Oral Given 4/26/25 1839)   HYDROmorphone (DILAUDID) injection 0.5 mg (0.5 mg Intravenous Given 4/26/25 1840)   sodium chloride 0.9 % bolus 500 mL (0 mL Intravenous Stopped 4/26/25 1944)       ED Risk Strat Scores   HEART Risk Score      Flowsheet Row Most Recent Value   Heart Score Risk Calculator    History 0 Filed at: 04/27/2025 0014   ECG 0 Filed at: 04/27/2025 0014   Age 1 Filed at: 04/27/2025 0014   Risk Factors 1 Filed at: 04/27/2025 0014   Troponin 0 Filed at: 04/27/2025 0014   HEART Score 2 Filed at: 04/27/2025 0014          HEART Risk Score      Flowsheet Row Most Recent Value   Heart Score Risk Calculator    History 0 Filed at: 04/27/2025 0014   ECG 0 Filed at: 04/27/2025 0014   Age 1 Filed at: 04/27/2025 0014   Risk Factors 1 Filed at: 04/27/2025 0014   Troponin 0 Filed at: 04/27/2025 0014   HEART Score 2 Filed at: 04/27/2025 0014                      No data recorded        SBIRT 22yo+      Flowsheet Row Most Recent Value   Initial Alcohol Screen: US AUDIT-C     1. How often do you have a drink containing alcohol? 1 Filed at: 04/26/2025 1852   2. How many drinks containing alcohol do you have on a typical day you are drinking?  0 Filed at: 04/26/2025 1852   3a. Male UNDER 65: How often do you have five or more drinks on one occasion? 0 Filed at: 04/26/2025 1852   3b. FEMALE Any Age, or MALE 65+: How often do you have 4 or more drinks on one occassion? 0 Filed at: 04/26/2025 1852   Audit-C Score 1 Filed at: 04/26/2025 1852   MUSA: How many times in the past year have you...    Used an illegal drug or used a prescription medication for non-medical reasons? Never Filed at: 04/26/2025 1852                             History of Present Illness       Chief Complaint   Patient presents with    Hyperglycemia - Symptomatic     Arrrives via EMS, reports blood sugar 500s, abd pain and back pain, vomiting/n/d for 10 days. Patient appears anxious. 15 lb weight gain in 1 month noted in triage, abd distended which is not baseline per patient. Last BM today. Reports daily drinking but last drink was 20 days ago. Reports not taking insulin for past 3 days due to not feeling well       Past Medical History:   Diagnosis Date    Alcohol abuse     Chronic pancreatitis (HCC)     Diabetes mellitus (HCC)     Type 2    Pancreatitis     Paroxysmal A-fib (HCC)     Thrombocytopenia (HCC)       Past Surgical History:   Procedure Laterality Date    INCISION AND DRAINAGE OF WOUND N/A 8/16/2020    Procedure: INCISION AND DRAINAGE (I&D) HEAD/FACE;  Surgeon: Estrada Walton DMD;  Location: [facility];  Service: Maxillofacial    IR BIOPSY BONE MARROW  2/7/2019    REMOVAL OF IMPACTED TOOTH - COMPLETELY BONY N/A 8/16/2020    Procedure: EXTRACTION TEETH MULTIPLE #2, 3;  Surgeon: Estrada Walton DMD;  Location: [facility];  Service: Maxillofacial      Family History   Problem Relation Age of Onset    Diabetes Mother     Hypertension Mother     Hyperlipidemia Father       Social History     Tobacco Use    Smoking status: Former     Passive exposure: Past    Smokeless tobacco: Never   Vaping Use    Vaping status: Never Used   Substance Use Topics    Alcohol use: Yes    Drug use: Not Currently     Types: Cocaine      E-Cigarette/Vaping    E-Cigarette Use Never User       E-Cigarette/Vaping Substances    Nicotine No     THC No     CBD No     Flavoring No     Other No     Unknown No       I have reviewed and agree with the history as documented.     55 y.o. male with CC N/V/D for the last 10 days and abdominal pain         Review of Systems   Constitutional:  Positive for appetite change and chills. Negative for activity change and fever.    Respiratory:  Negative for apnea and shortness of breath.    Gastrointestinal:  Positive for abdominal distention, abdominal pain, diarrhea, nausea and vomiting.   Skin:  Negative for color change and rash.   Neurological:  Positive for light-headedness and headaches. Negative for dizziness, syncope and weakness.           Objective       ED Triage Vitals   Temperature Pulse Blood Pressure Respirations SpO2 Patient Position - Orthostatic VS   04/26/25 1601 04/26/25 1601 04/26/25 1601 04/26/25 1601 04/26/25 1601 04/26/25 1601   98.9 °F (37.2 °C) 97 (!) 218/104 18 100 % Sitting      Temp Source Heart Rate Source BP Location FiO2 (%) Pain Score    04/26/25 1601 04/26/25 1601 04/26/25 1601 -- 04/26/25 1839    Oral Monitor Right arm  8      Vitals      Date and Time Temp Pulse SpO2 Resp BP Pain Score FACES Pain Rating User   04/26/25 2238 -- -- -- -- 182/90 -- -- AR   04/26/25 2230 -- -- -- -- -- 9 -- MC   04/26/25 2139 -- 84 98 % 18 181/90 -- -- DW   04/26/25 1845 -- 90 100 % 22 195/100 -- -- DM   04/26/25 1839 -- 84 100 % 16 195/100 8 -- DW   04/26/25 1738 -- 84 100 % 17 -- -- -- NV   04/26/25 1656 -- -- -- -- 231/109 -- -- AS   04/26/25 1601 98.9 °F (37.2 °C) 97 100 % 18 218/104 -- -- DW            Physical Exam  Constitutional:       General: He is not in acute distress.     Appearance: Normal appearance. He is not ill-appearing or diaphoretic.   HENT:      Head: Normocephalic and atraumatic.      Nose: Nose normal.   Eyes:      Pupils: Pupils are equal, round, and reactive to light.   Cardiovascular:      Rate and Rhythm: Normal rate and regular rhythm.      Pulses: Normal pulses.      Heart sounds: Normal heart sounds.   Pulmonary:      Effort: Pulmonary effort is normal. No respiratory distress.      Breath sounds: Rales present. No wheezing (RLL).   Abdominal:      General: Abdomen is flat. There is distension.      Tenderness: There is abdominal tenderness (diffuse generalized). There is no right CVA  tenderness, left CVA tenderness or guarding.   Musculoskeletal:         General: No swelling or tenderness.      Cervical back: Neck supple.      Right lower leg: No edema.      Left lower leg: No edema.   Skin:     General: Skin is warm and dry.      Capillary Refill: Capillary refill takes less than 2 seconds.   Neurological:      General: No focal deficit present.      Mental Status: He is alert and oriented to person, place, and time.      Cranial Nerves: No cranial nerve deficit.      Motor: No weakness.         Results Reviewed       Procedure Component Value Units Date/Time    HS Troponin I 2hr [[id number]]  (Normal) Collected: 04/26/25 1830    Lab Status: Final result Specimen: Blood from Line, Venous Updated: 04/26/25 1922     hs TnI 2hr 11 ng/L      Delta 2hr hsTnI 0 ng/L     Lactic acid 2 Hours [[id number]]  (Normal) Collected: 04/26/25 1830    Lab Status: Final result Specimen: Blood from Line, Venous Updated: 04/26/25 1913     LACTIC ACID 1.2 mmol/L     Narrative:      Result may be elevated if tourniquet was used during collection.    Fingerstick Glucose (POCT) [[id number]]  (Abnormal) Collected: 04/26/25 1832    Lab Status: Final result Specimen: Blood Updated: 04/26/25 1833     POC Glucose 388 mg/dl     Salicylate level [[id number]]  (Normal) Collected: 04/26/25 1736    Lab Status: Final result Specimen: Blood from Arm, Right Updated: 04/26/25 1819     Salicylate Lvl <5 mg/dL     Acetaminophen level-If concentration is detectable, please discuss with medical  on call. [[id number]]  (Abnormal) Collected: 04/26/25 1736    Lab Status: Final result Specimen: Blood from Arm, Right Updated: 04/26/25 1819     Acetaminophen Level <2 ug/mL     B-Type Natriuretic Peptide(BNP) [[id number]]  (Abnormal) Collected: 04/26/25 1615    Lab Status: Final result Specimen: Blood from Arm, Right Updated: 04/26/25 1817      pg/mL     Comprehensive metabolic panel [[id number]]  (Abnormal) Collected: 04/26/25  1615    Lab Status: Final result Specimen: Blood from Arm, Right Updated: 04/26/25 1805     Sodium 127 mmol/L      Potassium 3.7 mmol/L      Chloride 100 mmol/L      CO2 19 mmol/L      ANION GAP 8 mmol/L      BUN 12 mg/dL      Creatinine 1.52 mg/dL      Glucose 489 mg/dL      Calcium 8.3 mg/dL      Corrected Calcium 9.3 mg/dL      AST 81 U/L      ALT 60 U/L      Alkaline Phosphatase 639 U/L      Total Protein 6.0 g/dL      Albumin 2.8 g/dL      Total Bilirubin 1.89 mg/dL      eGFR 50 ml/min/1.73sq m     Narrative:      [employer] guidelines for Chronic Kidney Disease (CKD):     Stage 1 with normal or high GFR (GFR > 90 mL/min/1.73 square meters)    Stage 2 Mild CKD (GFR = 60-89 mL/min/1.73 square meters)    Stage 3A Moderate CKD (GFR = 45-59 mL/min/1.73 square meters)    Stage 3B Moderate CKD (GFR = 30-44 mL/min/1.73 square meters)    Stage 4 Severe CKD (GFR = 15-29 mL/min/1.73 square meters)    Stage 5 End Stage CKD (GFR <15 mL/min/1.73 square meters)  Note: GFR calculation is accurate only with a steady state creatinine    Lipase [[id number]]  (Normal) Collected: 04/26/25 1615    Lab Status: Final result Specimen: Blood from Arm, Right Updated: 04/26/25 1759     Lipase 14 u/L     Beta Hydroxybutyrate [[id number]]  (Normal) Collected: 04/26/25 1615    Lab Status: Final result Specimen: Blood from Arm, Right Updated: 04/26/25 1759     Beta- Hydroxybutyrate <0.05 mmol/L     Ethanol [[id number]]  (Normal) Collected: 04/26/25 1736    Lab Status: Final result Specimen: Blood from Arm, Right Updated: 04/26/25 1756     Ethanol Lvl <10 mg/dL     HS Troponin 0hr (reflex protocol) [[id number]]  (Normal) Collected: 04/26/25 1615    Lab Status: Final result Specimen: Blood from Arm, Right Updated: 04/26/25 1727     hs TnI 0hr 11 ng/L     Lactic acid, plasma (w/reflex if result > 2.0) [[id number]]  (Abnormal) Collected: 04/26/25 1518    Lab Status: Final result Specimen: Blood from Arm, Right Updated:  04/26/25 1719     LACTIC ACID 2.1 mmol/L     Narrative:      Result may be elevated if tourniquet was used during collection.    Blood gas, venous [[id number]]  (Abnormal) Collected: 04/26/25 1615    Lab Status: Final result Specimen: Blood from Arm, Right Updated: 04/26/25 1705     pH, Sami 7.539     pCO2, Sami 22.3 mm Hg      pO2, Sami 49.2 mm Hg      HCO3, Sami 18.6 mmol/L      Base Excess, Sami -2.3 mmol/L      O2 Content, Sami 14.5 ml/dL      O2 HGB, VENOUS 88.0 %     CBC and differential [[id number]]  (Abnormal) Collected: 04/26/25 1615    Lab Status: Final result Specimen: Blood from Arm, Right Updated: 04/26/25 1705     WBC 10.20 Thousand/uL      RBC 3.57 Million/uL      Hemoglobin 11.0 g/dL      Hematocrit 30.9 %      MCV 87 fL      MCH 30.8 pg      MCHC 35.6 g/dL      RDW 12.2 %      MPV 12.2 fL      Platelets 235 Thousands/uL      nRBC 0 /100 WBCs      Segmented % 62 %      Immature Grans % 0 %      Lymphocytes % 24 %      Monocytes % 8 %      Eosinophils Relative 5 %      Basophils Relative 1 %      Absolute Neutrophils 6.43 Thousands/µL      Absolute Immature Grans 0.04 Thousand/uL      Absolute Lymphocytes 2.43 Thousands/µL      Absolute Monocytes 0.79 Thousand/µL      Eosinophils Absolute 0.46 Thousand/µL      Basophils Absolute 0.05 Thousands/µL     Fingerstick Glucose (POCT) [[id number]]  (Abnormal) Collected: 04/26/25 1600    Lab Status: Final result Specimen: Blood Updated: 04/26/25 1601     POC Glucose 443 mg/dl             US right upper quadrant   Final Interpretation by Elida Lmoas MD (04/26 2032)      Gallbladder wall thickening in the setting of ascites. Findings most likely reflects the presence of ascites, underlying liver disease, heart failure or hypoproteinemia. Acute cholecystitis is considered less likely.   Moderate ascites.      Workstation performed: [id number]         CT abdomen pelvis with contrast   ED Interpretation by Flores Monk MD (04/26 1824)   Concerning for acute  "cholecystitis with additional fluid surrounding the liver       Final Interpretation by Jad Syed MD (04/26 7238)      Enteritis.      Mild mesenteric edema and small volume ascites. Small left pleural effusion.      Stable findings of chronic pancreatitis.         Workstation performed: [id number]         XR chest 2 views   ED Interpretation by Flores Monk MD (04/26 4359)   Concerning for lower lobe pneumonia on lateral           Procedures    ED Medication and Procedure Management   Prior to Admission Medications   Prescriptions Last Dose Informant Patient Reported? Taking?   Alcohol Swabs 70 % PADS   No No   Sig: May substitute brand based on insurance coverage. Check glucose TID.   Blood Glucose Monitoring Suppl (OneTouch Verio Reflect) w/Device KIT   No No   Sig: May substitute brand based on insurance coverage. Check glucose TID.   Insulin Syringe-Needle U-100 (BD Insulin Syringe U/F) 31G X 5/16\" 0.3 ML MISC   No No   Sig: For use with insulin. Pharmacy may dispense brand covered by insurance.   Multiple Vitamin (multivitamin) tablet   No No   Sig: Take 1 tablet by mouth daily   OneTouch Delica Lancets 33G MISC   No No   Sig: May substitute brand based on insurance coverage. Check glucose TID.   amLODIPine (NORVASC) 5 mg tablet   No No   Sig: Take 1 tablet (5 mg total) by mouth 2 (two) times a day   folic acid ( Folic Acid) 1 mg tablet   No No   Sig: Take 1 tablet (1 mg total) by mouth daily   glucose blood (OneTouch Verio) test strip   No No   Sig: May substitute brand based on insurance coverage. Check glucose TID.   hydrALAZINE (APRESOLINE) 25 mg tablet   No No   Sig: Take 1 tablet (25 mg total) by mouth every 8 (eight) hours   insulin NPH-insulin regular (HumuLIN 70/30) 100 units/mL subcutaneous injection   No No   Sig: Inject 5 Units under the skin 2 (two) times a day before meals Do not give yourself insulin if you are not going to eat.   pancrelipase, Lip-Prot-Amyl, (CREON) 24,000 units   No " No   Sig: Take 24,000 units of lipase by mouth 3 (three) times a day with meals   thiamine 50 MG tablet   No No   Sig: Take 1 tablet (50 mg total) by mouth daily      Facility-Administered Medications: None     Current Discharge Medication List        CONTINUE these medications which have NOT CHANGED    Details   Alcohol Swabs 70 % PADS May substitute brand based on insurance coverage. Check glucose TID.  Qty: 100 each, Refills: 0    Associated Diagnoses: Type 2 diabetes mellitus with hyperosmolarity without coma, without long-term current use of insulin ([facility])      amLODIPine (NORVASC) 5 mg tablet Take 1 tablet (5 mg total) by mouth 2 (two) times a day  Qty: 120 tablet, Refills: 0    Associated Diagnoses: Essential hypertension      Blood Glucose Monitoring Suppl (OneTouch Verio Reflect) w/Device KIT May substitute brand based on insurance coverage. Check glucose TID.  Qty: 1 kit, Refills: 0    Associated Diagnoses: Type 2 diabetes mellitus with hyperosmolarity without coma, without long-term current use of insulin ([facility])      folic acid ( Folic Acid) 1 mg tablet Take 1 tablet (1 mg total) by mouth daily  Qty: 30 tablet, Refills: 0    Associated Diagnoses: Alcohol abuse with withdrawal ([facility])      glucose blood (OneTouch Verio) test strip May substitute brand based on insurance coverage. Check glucose TID.  Qty: 100 each, Refills: 0    Associated Diagnoses: Type 2 diabetes mellitus with hyperosmolarity without coma, without long-term current use of insulin ([facility])      hydrALAZINE (APRESOLINE) 25 mg tablet Take 1 tablet (25 mg total) by mouth every 8 (eight) hours  Qty: 180 tablet, Refills: 0    Associated Diagnoses: Essential hypertension      insulin NPH-insulin regular (HumuLIN 70/30) 100 units/mL subcutaneous injection Inject 5 Units under the skin 2 (two) times a day before meals Do not give yourself insulin if you are not going to eat.  Qty: 10 mL, Refills: 1    Associated Diagnoses: Type 2 diabetes mellitus  "([facility])      Insulin Syringe-Needle U-100 (BD Insulin Syringe U/F) 31G X 5/16\" 0.3 ML MISC For use with insulin. Pharmacy may dispense brand covered by insurance.  Qty: 100 each, Refills: 0    Associated Diagnoses: Type 2 diabetes mellitus (HCC)      Multiple Vitamin (multivitamin) tablet Take 1 tablet by mouth daily  Qty: 30 tablet, Refills: 0    Associated Diagnoses: Type 2 diabetes mellitus with hyperosmolarity without coma, without long-term current use of insulin ([facility])      OneTouch Delica Lancets 33G MISC May substitute brand based on insurance coverage. Check glucose TID.  Qty: 100 each, Refills: 0    Associated Diagnoses: Type 2 diabetes mellitus with hyperosmolarity without coma, without long-term current use of insulin ([facility])      pancrelipase, Lip-Prot-Amyl, (CREON) 24,000 units Take 24,000 units of lipase by mouth 3 (three) times a day with meals  Qty: 90 capsule, Refills: 0    Associated Diagnoses: Type 2 diabetes mellitus with hyperglycemia, without long-term current use of insulin ([facility])      thiamine 50 MG tablet Take 1 tablet (50 mg total) by mouth daily  Qty: 30 tablet, Refills: 0    Associated Diagnoses: Alcohol abuse with withdrawal ([facility])           No discharge procedures on file.  ED SEPSIS DOCUMENTATION   Time reflects when diagnosis was documented in both MDM as applicable and the Disposition within this note       Time User Action Codes Description Comment    4/26/2025  8:47 PM Flores Monk [R74.01] Transaminitis     4/26/2025  8:48 PM Flores Monk Add [R74.8] Elevated alkaline phosphatase level     4/26/2025  8:48 PM Flores Monk [R17] Serum total bilirubin elevated     4/26/2025  8:48 PM Flores Monk [R10.9] Abdominal pain     4/26/2025  8:48 PM Flores Monk [R73.9] Hyperglycemia                  Flores Monk MD  04/27/25 0015    "

## 2025-04-26 NOTE — ED ATTENDING ATTESTATION
4/26/2025  IDarcy DO, saw and evaluated the patient. I have discussed the patient with the resident/non-physician practitioner and agree with the resident's/non-physician practitioner's findings, Plan of Care, and MDM as documented in the resident's/non-physician practitioner's note, except where noted. All available labs and Radiology studies were reviewed.  I was present for key portions of any procedure(s) performed by the resident/non-physician practitioner and I was immediately available to provide assistance.       At this point I agree with the current assessment done in the Emergency Department.  I have conducted an independent evaluation of this patient a history and physical is as follows:        A 56 yo male with pmhx of alcohol abuse, DM, paroxysmal afib, CKD and HTN; presents with right upper quadrant abdominal pain, vomiting and diarrhea for the past 10 days.  He also reports not taking his insulin for the past 3 days.  He is overall a poor historian, limited in providing additional history even with a .    Physical Exam  General Appearance: alert and oriented, ill appearing  Skin:  Warm, dry, intact  HEENT: atraumatic, normocephalic  Neck: Supple, trachea midline  Cardiac: RRR; no murmurs, rub, gallops  Pulmonary: lungs CTAB; no wheezes, rales, rhonchi  Gastrointestinal: abdomen soft, RUQ tenderness, nondistended; no guarding or rebound tenderness; good bowel sounds, no mass or bruits  Extremities:  no pedal edema, 2+ pulses; no calf tenderness, no clubbing, no cyanosis  Neuro:  no focal motor or sensory deficits, CN 2-12 grossly intact  Psych:  Normal mood and affect, normal judgement and insight    Assessment and Plan:  Vomiting and diarrhea, associated with RUQ abdominal pain/tenderness.  Pt noted to be significantly hyperglycemic, likely due to insulin noncompliance.  Will check labs to evaluate for electrolyte abnormality, ZENAIDA, anemia, significant leukocytosis,  DKA/HHNK, pancreatitis, hepatitis and biliary etiology.  Will check EKG and troponin for cardiac injury.  Will check CTAP for intra-abdominal pathology, predominantly gallbladder.       ED Course         Critical Care Time  Procedures

## 2025-04-27 PROBLEM — I16.0 HYPERTENSIVE URGENCY: Status: RESOLVED | Noted: 2024-10-22 | Resolved: 2025-04-27

## 2025-04-27 PROBLEM — R10.9 ABDOMINAL PAIN: Status: ACTIVE | Noted: 2025-04-27

## 2025-04-27 LAB
ALBUMIN SERPL BCG-MCNC: 2.3 G/DL (ref 3.5–5)
ALP SERPL-CCNC: 527 U/L (ref 34–104)
ALT SERPL W P-5'-P-CCNC: 42 U/L (ref 7–52)
ANION GAP SERPL CALCULATED.3IONS-SCNC: 5 MMOL/L (ref 4–13)
AST SERPL W P-5'-P-CCNC: 35 U/L (ref 13–39)
ATRIAL RATE: 98 BPM
BILIRUB SERPL-MCNC: 0.86 MG/DL (ref 0.2–1)
BUN SERPL-MCNC: 11 MG/DL (ref 5–25)
CALCIUM ALBUM COR SERPL-MCNC: 9.2 MG/DL (ref 8.3–10.1)
CALCIUM SERPL-MCNC: 7.8 MG/DL (ref 8.4–10.2)
CHLORIDE SERPL-SCNC: 107 MMOL/L (ref 96–108)
CO2 SERPL-SCNC: 23 MMOL/L (ref 21–32)
CREAT SERPL-MCNC: 1.38 MG/DL (ref 0.6–1.3)
ERYTHROCYTE [DISTWIDTH] IN BLOOD BY AUTOMATED COUNT: 11.7 % (ref 11.6–15.1)
GFR SERPL CREATININE-BSD FRML MDRD: 57 ML/MIN/1.73SQ M
GLUCOSE SERPL-MCNC: 142 MG/DL (ref 65–140)
GLUCOSE SERPL-MCNC: 149 MG/DL (ref 65–140)
GLUCOSE SERPL-MCNC: 154 MG/DL (ref 65–140)
GLUCOSE SERPL-MCNC: 183 MG/DL (ref 65–140)
GLUCOSE SERPL-MCNC: 200 MG/DL (ref 65–140)
HCT VFR BLD AUTO: 27.4 % (ref 36.5–49.3)
HGB BLD-MCNC: 9.6 G/DL (ref 12–17)
INR PPP: 1 (ref 0.85–1.19)
MCH RBC QN AUTO: 30.4 PG (ref 26.8–34.3)
MCHC RBC AUTO-ENTMCNC: 35 G/DL (ref 31.4–37.4)
MCV RBC AUTO: 87 FL (ref 82–98)
P AXIS: 54 DEGREES
PLATELET # BLD AUTO: 242 THOUSANDS/UL (ref 149–390)
PMV BLD AUTO: 11 FL (ref 8.9–12.7)
POTASSIUM SERPL-SCNC: 3 MMOL/L (ref 3.5–5.3)
PR INTERVAL: 140 MS
PROT SERPL-MCNC: 4.9 G/DL (ref 6.4–8.4)
PROTHROMBIN TIME: 13.4 SECONDS (ref 12.3–15)
QRS AXIS: -12 DEGREES
QRSD INTERVAL: 84 MS
QT INTERVAL: 368 MS
QTC INTERVAL: 467 MS
RBC # BLD AUTO: 3.16 MILLION/UL (ref 3.88–5.62)
SODIUM SERPL-SCNC: 135 MMOL/L (ref 135–147)
T WAVE AXIS: 12 DEGREES
VENTRICULAR RATE: 97 BPM
WBC # BLD AUTO: 11.44 THOUSAND/UL (ref 4.31–10.16)

## 2025-04-27 PROCEDURE — 94762 N-INVAS EAR/PLS OXIMTRY CONT: CPT

## 2025-04-27 PROCEDURE — 82948 REAGENT STRIP/BLOOD GLUCOSE: CPT

## 2025-04-27 PROCEDURE — 85610 PROTHROMBIN TIME: CPT | Performed by: STUDENT IN AN ORGANIZED HEALTH CARE EDUCATION/TRAINING PROGRAM

## 2025-04-27 PROCEDURE — 99232 SBSQ HOSP IP/OBS MODERATE 35: CPT | Performed by: INTERNAL MEDICINE

## 2025-04-27 PROCEDURE — 93010 ELECTROCARDIOGRAM REPORT: CPT | Performed by: STUDENT IN AN ORGANIZED HEALTH CARE EDUCATION/TRAINING PROGRAM

## 2025-04-27 PROCEDURE — 85027 COMPLETE CBC AUTOMATED: CPT

## 2025-04-27 PROCEDURE — 99222 1ST HOSP IP/OBS MODERATE 55: CPT | Performed by: INTERNAL MEDICINE

## 2025-04-27 PROCEDURE — 80053 COMPREHEN METABOLIC PANEL: CPT

## 2025-04-27 RX ORDER — FOLIC ACID 1 MG/1
1 TABLET ORAL DAILY
Status: DISCONTINUED | OUTPATIENT
Start: 2025-04-27 | End: 2025-04-30 | Stop reason: HOSPADM

## 2025-04-27 RX ORDER — POTASSIUM CHLORIDE 1500 MG/1
40 TABLET, EXTENDED RELEASE ORAL ONCE
Status: COMPLETED | OUTPATIENT
Start: 2025-04-27 | End: 2025-04-27

## 2025-04-27 RX ORDER — LANOLIN ALCOHOL/MO/W.PET/CERES
100 CREAM (GRAM) TOPICAL DAILY
Status: DISCONTINUED | OUTPATIENT
Start: 2025-04-27 | End: 2025-04-30 | Stop reason: HOSPADM

## 2025-04-27 RX ADMIN — INSULIN LISPRO 1 UNITS: 100 INJECTION, SOLUTION INTRAVENOUS; SUBCUTANEOUS at 11:48

## 2025-04-27 RX ADMIN — HEPARIN SODIUM 5000 UNITS: 5000 INJECTION INTRAVENOUS; SUBCUTANEOUS at 14:42

## 2025-04-27 RX ADMIN — HYDRALAZINE HYDROCHLORIDE 25 MG: 25 TABLET ORAL at 05:16

## 2025-04-27 RX ADMIN — INSULIN LISPRO 1 UNITS: 100 INJECTION, SOLUTION INTRAVENOUS; SUBCUTANEOUS at 17:52

## 2025-04-27 RX ADMIN — HEPARIN SODIUM 5000 UNITS: 5000 INJECTION INTRAVENOUS; SUBCUTANEOUS at 21:06

## 2025-04-27 RX ADMIN — POTASSIUM CHLORIDE 40 MEQ: 1500 TABLET, EXTENDED RELEASE ORAL at 08:20

## 2025-04-27 RX ADMIN — HYDRALAZINE HYDROCHLORIDE 25 MG: 25 TABLET ORAL at 14:42

## 2025-04-27 RX ADMIN — INSULIN ASPART 4 UNITS: 100 INJECTION, SUSPENSION SUBCUTANEOUS at 17:52

## 2025-04-27 RX ADMIN — HYDRALAZINE HYDROCHLORIDE 25 MG: 25 TABLET ORAL at 21:33

## 2025-04-27 RX ADMIN — HEPARIN SODIUM 5000 UNITS: 5000 INJECTION INTRAVENOUS; SUBCUTANEOUS at 05:16

## 2025-04-27 RX ADMIN — AMLODIPINE BESYLATE 5 MG: 5 TABLET ORAL at 08:20

## 2025-04-27 RX ADMIN — Medication 2.5 MG: at 11:21

## 2025-04-27 RX ADMIN — Medication 100 MG: at 08:20

## 2025-04-27 RX ADMIN — INSULIN LISPRO 1 UNITS: 100 INJECTION, SOLUTION INTRAVENOUS; SUBCUTANEOUS at 08:21

## 2025-04-27 RX ADMIN — FOLIC ACID 1 MG: 1 TABLET ORAL at 08:20

## 2025-04-27 RX ADMIN — OXYCODONE 5 MG: 5 TABLET ORAL at 17:56

## 2025-04-27 RX ADMIN — INSULIN ASPART 4 UNITS: 100 INJECTION, SUSPENSION SUBCUTANEOUS at 09:07

## 2025-04-27 RX ADMIN — AMLODIPINE BESYLATE 5 MG: 5 TABLET ORAL at 17:52

## 2025-04-27 NOTE — ASSESSMENT & PLAN NOTE
History of paroxysmal atrial fibrillation  Currently in sinus rhythm  Patient not prescribed any rate control medications  Not on anticoagulation

## 2025-04-27 NOTE — ASSESSMENT & PLAN NOTE
Pseudohyponatremia in setting of hyperglycemia, plus history of chronic hyponatremia secondary to excessive free water due to alcohol  Normalized

## 2025-04-27 NOTE — ASSESSMENT & PLAN NOTE
History of alcohol use, did not quantify use, last drink approximately 20 days ago. History of withdrawal and possible withdrawal seizure    Plan:  Monitor for evidence of withdrawal   Alcohol use may be cause of above problems  Complete abstinence

## 2025-04-27 NOTE — ASSESSMENT & PLAN NOTE
Patient was admitted 10/2024 with witnessed tonic-clonic seizures  Diagnosis metabolic, possibly related to alcohol withdrawal: Was eval by neurology and was not recommended to start antiepileptic therapy

## 2025-04-27 NOTE — PLAN OF CARE
Problem: Potential for Falls  Goal: Patient will remain free of falls  Description: INTERVENTIONS:- Educate patient/family on patient safety including physical limitations- Instruct patient to call for assistance with activity - Consult OT/PT to assist with strengthening/mobility - Keep Call bell within reach- Keep bed low and locked with side rails adjusted as appropriate- Keep care items and personal belongings within reach- Initiate and maintain comfort rounds- Make Fall Risk Sign visible to staff- Offer Toileting every 2 Hours, in advance of need- Initiate/Maintain bed alarm- Obtain necessary fall risk management equipment- Apply yellow socks and bracelet for high fall risk patients- Consider moving patient to room near nurses station  INTERVENTIONS:- Educate patient/family on patient safety including physical limitations- Instruct patient to call for assistance with activity - Consult OT/PT to assist with strengthening/mobility - Keep Call bell within reach- Keep bed low and locked with side rails adjusted as appropriate- Keep care items and personal belongings within reach- Initiate and maintain comfort rounds- Make Fall Risk Sign visible to staff- Offer Toileting every 2 Hours, in advance of need- Initiate/Maintain bed alarm- Obtain necessary fall risk management equipment- Apply yellow socks and bracelet for high fall risk patients- Consider moving patient to room near nurses station  Outcome: Progressing     Problem: PAIN - ADULT  Goal: Verbalizes/displays adequate comfort level or baseline comfort level  Description: Interventions:- Encourage patient to monitor pain and request assistance- Assess pain using appropriate pain scale- Administer analgesics based on type and severity of pain and evaluate response- Implement non-pharmacological measures as appropriate and evaluate response- Consider cultural and social influences on pain and pain management- Notify physician/advanced practitioner if  interventions unsuccessful or patient reports new pain  Outcome: Progressing     Problem: SAFETY ADULT  Goal: Patient will remain free of falls  Description: INTERVENTIONS:- Educate patient/family on patient safety including physical limitations- Instruct patient to call for assistance with activity - Consult OT/PT to assist with strengthening/mobility - Keep Call bell within reach- Keep bed low and locked with side rails adjusted as appropriate- Keep care items and personal belongings within reach- Initiate and maintain comfort rounds- Make Fall Risk Sign visible to staff- Offer Toileting every 2 Hours, in advance of need- Initiate/Maintain bed alarm- Obtain necessary fall risk management equipment- Apply yellow socks and bracelet for high fall risk patients- Consider moving patient to room near nurses station  INTERVENTIONS:- Educate patient/family on patient safety including physical limitations- Instruct patient to call for assistance with activity - Consult OT/PT to assist with strengthening/mobility - Keep Call bell within reach- Keep bed low and locked with side rails adjusted as appropriate- Keep care items and personal belongings within reach- Initiate and maintain comfort rounds- Make Fall Risk Sign visible to staff- Offer Toileting every 2 Hours, in advance of need- Initiate/Maintain bed alarm- Obtain necessary fall risk management equipment- Apply yellow socks and bracelet for high fall risk patients- Consider moving patient to room near nurses station  Outcome: Progressing     Problem: DISCHARGE PLANNING  Goal: Discharge to home or other facility with appropriate resources  Description: INTERVENTIONS:- Identify barriers to discharge w/patient and caregiver- Arrange for needed discharge resources and transportation as appropriate- Identify discharge learning needs (meds, wound care, etc.)- Arrange for interpretive services to assist at discharge as needed- Refer to Case Management Department for  coordinating discharge planning if the patient needs post-hospital services based on physician/advanced practitioner order or complex needs related to functional status, cognitive ability, or social support system  Outcome: Progressing     Problem: Knowledge Deficit  Goal: Patient/family/caregiver demonstrates understanding of disease process, treatment plan, medications, and discharge instructions  Description: Complete learning assessment and assess knowledge base.Interventions:- Provide teaching at level of understanding- Provide teaching via preferred learning methods  Outcome: Progressing     Problem: Knowledge Deficit  Goal: Patient/family/caregiver demonstrates understanding of disease process, treatment plan, medications, and discharge instructions  Description: Complete learning assessment and assess knowledge base.Interventions:- Provide teaching at level of understanding- Provide teaching via preferred learning methods  Outcome: Progressing     Problem: CARDIOVASCULAR - ADULT  Goal: Maintains optimal cardiac output and hemodynamic stability  Description: INTERVENTIONS:- Monitor I/O, vital signs and rhythm- Monitor for S/S and trends of decreased cardiac output- Administer and titrate ordered vasoactive medications to optimize hemodynamic stability- Assess quality of pulses, skin color and temperature- Assess for signs of decreased coronary artery perfusion- Instruct patient to report change in severity of symptoms  Outcome: Progressing  Goal: Absence of cardiac dysrhythmias or at baseline rhythm  Description: INTERVENTIONS:- Continuous cardiac monitoring, vital signs, obtain 12 lead EKG if ordered- Administer antiarrhythmic and heart rate control medications as ordered- Monitor electrolytes and administer replacement therapy as ordered  Outcome: Progressing

## 2025-04-27 NOTE — ASSESSMENT & PLAN NOTE
Lab Results   Component Value Date    HGBA1C 9.8 (H) 02/05/2025     Recent Labs     04/26/25  1600 04/26/25  1832   POCGLU 443* 388*   Blood Sugar Average: Last 72 hrs:  (P) 415.5    Elevated in setting of missed medications  outpatient regimen: [facility] 4u BID    Plan:  BG goal 140-200 while inpatient  SSI  Holding oral antihyperglycemics

## 2025-04-27 NOTE — ASSESSMENT & PLAN NOTE
Lab Results   Component Value Date    HGBA1C 9.8 (H) 02/05/2025     Recent Labs     04/26/25  2327 04/27/25  0754 04/27/25  1123 04/27/25  1528   POCGLU 344* 154* 200* 183*   Blood Sugar Average: Last 72 hrs:  (P) 285.5536329378994829  outpatient regimen: NPH 4u BID  Patient presented with hyperglycemia secondary to missed medications  Was restarted on his home regimen, plus Accu-Cheks and sliding scale insulin with improvement

## 2025-04-27 NOTE — ASSESSMENT & PLAN NOTE
Patient with a history of alcohol abuse  Presented with a transaminitis with AST: ALT ratio 1: 1  Total bilirubin 1.89  Normal INR  Not likely consistent with alcoholic hepatitis  Workup as above  Check acute hepatitis panel

## 2025-04-27 NOTE — ASSESSMENT & PLAN NOTE
Presents with abdominal pain, nausea, vomiting. Labs reveal hyponatremia, cholestatic transaminitis, tbili 1.89, lactate 2.1  CT abd/pelvis reviewed showing small bowel thickening suggesting enteritis, mild mesenteric edema and small volume ascites and stable findings of chronic pancreatitis  RUQ U/S reviewed showing gall bladder wall thickening 6-7mm most likely in the setting of ascites with acute cholecystitis less likely, CBD 4mm, negative sanches's    Plan:  Empiric ceftriaxone in ED, hold further unless worsening infectious endpoints  Multimodal analgesia  Trend LFTs  GI consult

## 2025-04-27 NOTE — CONSULTS
Consultation - Gastroenterology   Name: Wes Araujo 55 y.o. male I MRN: [id number]  Unit/Bed#: E2 -01 I Date of Admission: 4/26/2025   Date of Service: 4/27/2025 I Hospital Day: 1       Inpatient consult to gastroenterology     Date/Time  4/27/2025 7:47 AM     Performed by  Vishnu Larsen MD   Authorized by  Samy Bah PA-C           Physician Requesting Evaluation: Mojgan Young MD   Reason for Evaluation / Principal Problem: Transaminitis    Assessment & Plan  Transaminitis  55 y.o. male with history of hypertension, CKD, type 2 diabetes mellitus, chronic pancreatitis and alcohol abuse who presented on 4/26 due to abdominal pain, nausea and vomiting after not taking his insulin in 3 days found to have elevated liver enzymes on admission with AST 81, ALT 60, alkaline phosphatase 639, total bilirubin 1.89 with ZENAIDA and right upper quadrant ultrasound notable for circumferential wall thickening of the gallbladder without stones or sludge with pericholecystic fluid in the setting of ascites with normal CBD and moderate to large ascites overall concerning for previous episode of alcoholic hepatitis resulting in ascites with possible SBP versus viral gastroenteritis versus acute cholecystitis leading to GI consult.      - Trend liver enzymes  - INR 1.0 making alcoholic hepatitis less likely.  Recommend trending  - Recommend IR consultation for diagnostic and therapeutic paracentesis  - Start bowel regiment once diarrhea improves with MiraLAX daily  - Counseled on importance of alcohol cessation  - Previous hepatitis serologies nonreactive  - If no other etiology elucidated consider HIDA scan and surgery consult  Type 2 diabetes mellitus with hyperglycemia, with long-term current use of insulin (HCC)  Lab Results   Component Value Date    HGBA1C 9.8 (H) 02/05/2025       Recent Labs     04/26/25  1600 04/26/25  1832 04/26/25  2327   POCGLU 443* 388* 344*       Blood Sugar Average: Last 72 hrs:  (P)  [id number]    Uncomplicated alcohol dependence (HCC)    I have discussed the above management plan in detail with the primary service.     History of Present Illness   HPI:  Wes Araujo is a 55 y.o. male with history of hypertension, CKD, type 2 diabetes mellitus, chronic pancreatitis and alcohol abuse who presented on 4/26 due to abdominal pain, nausea and vomiting.  He had not taken his insulin in 3 days.  Patient reports sobriety for the past 20 days, but states he was drinking about 6-7 beers daily prior to that.  Denies other drug use.  Denies recent travel or sick contacts.  Recently hospitalized in February for unintentional weight loss leading to bidirectional endoscopy.  Patient found to have elevated liver enzymes on admission with AST 81, ALT 60, alkaline phosphatase 639, total bilirubin 1.89.  Creatinine 1.52 on admission (baseline appears to be 1-1.3 recently).  Hemoglobin 11.0 on admission with recent baseline 10-11.  Right upper quadrant ultrasound notable for circumferential wall thickening of the gallbladder without stones or sludge with pericholecystic fluid in the setting of ascites without intrahepatic biliary dilatation, CBD measuring 4 mm, noted choledocholithiasis with moderate to large ascites.  CT abdomen notable for atrophic pancreas with pancreatic calcifications and stable main duct dilatation as well as fluid-filled loops of small bowel.    Appreciate assistance of  for history.  Of note patient is hard of hearing, hears best in left ear, but even with optimizing setting he still becomes quite frustrated and is difficult to acquire history.    EGD/colonoscopy 2/10/2025 performed for abdominal pain and unintentional weight loss notable for erosive gastritis and small Dieulafoy's lesion in the antrum with oozing blood status post bipolar cautery and 1 hemostatic clip placement with colonoscopy showing 2 subcentimeter polyps removed with cold snare with  residual liquid stool throughout colon requiring extensive lavage, recall 3 years.  Pathology showing normal gastric and duodenal biopsies with polyps removed tubular adenomas    Review of Systems   Constitutional:  Negative for fever and unexpected weight change.   Gastrointestinal:  Positive for abdominal pain. Negative for blood in stool, constipation, diarrhea, nausea, rectal pain and vomiting.   All other systems reviewed and are negative.    Historical Information   Past Medical History:   Diagnosis Date    Alcohol abuse     Chronic pancreatitis (HCC)     Diabetes mellitus (HCC)     Type 2    Pancreatitis     Paroxysmal A-fib (HCC)     Thrombocytopenia (HCC)      Past Surgical History:   Procedure Laterality Date    INCISION AND DRAINAGE OF WOUND N/A 8/16/2020    Procedure: INCISION AND DRAINAGE (I&D) HEAD/FACE;  Surgeon: Estrada Walton DMD;  Location: [facility];  Service: Maxillofacial    IR BIOPSY BONE MARROW  2/7/2019    REMOVAL OF IMPACTED TOOTH - COMPLETELY BONY N/A 8/16/2020    Procedure: EXTRACTION TEETH MULTIPLE #2, 3;  Surgeon: Estrada Walton DMD;  Location: [facility];  Service: Maxillofacial     Social History     Tobacco Use    Smoking status: Former     Passive exposure: Past    Smokeless tobacco: Never   Vaping Use    Vaping status: Never Used   Substance and Sexual Activity    Alcohol use: Yes    Drug use: Not Currently     Types: Cocaine    Sexual activity: Not on file     E-Cigarette/Vaping    E-Cigarette Use Never User      E-Cigarette/Vaping Substances    Nicotine No     THC No     CBD No     Flavoring No     Other No     Unknown No      Family history non-contributory    Objective :  Temp:  [97.8 °F (36.6 °C)-98.9 °F (37.2 °C)] 97.8 °F (36.6 °C)  HR:  [79-97] 84  BP: (124-231)/() 159/86  Resp:  [16-22] 18  SpO2:  [98 %-100 %] 98 %  O2 Device: None (Room air)    Physical Exam  Vitals and nursing note reviewed.   Constitutional:       Appearance: He is well-developed and normal weight.    HENT:      Head: Atraumatic.   Eyes:      Conjunctiva/sclera: Conjunctivae normal.   Abdominal:      General: There is no distension.      Palpations: Abdomen is soft.      Tenderness: There is abdominal tenderness. There is no guarding or rebound.   Skin:     General: Skin is warm and dry.      Capillary Refill: Capillary refill takes less than 2 seconds.   Psychiatric:         Mood and Affect: Mood normal.           Lab Results: I have reviewed the following results:none    Imaging Results Review: No pertinent imaging studies reviewed.  Other Study Results Review: No additional pertinent studies reviewed.    Administrative Statements   I have spent a total time of 32 minutes in caring for this patient on the day of the visit/encounter including Diagnostic results, Prognosis, Risks and benefits of tx options, Instructions for management, and Patient and family education.

## 2025-04-27 NOTE — H&P
H&P - Hospitalist   Name: Wes Araujo 55 y.o. male I MRN: [id number]  Unit/Bed#: ED-42 I Date of Admission: 4/26/2025   Date of Service: 4/26/2025 I Hospital Day: 0     Assessment & Plan  Transaminitis  Presents with abdominal pain, nausea, vomiting. Labs reveal hyponatremia, cholestatic transaminitis, tbili 1.89, lactate 2.1  CT abd/pelvis reviewed showing small bowel thickening suggesting enteritis, mild mesenteric edema and small volume ascites and stable findings of chronic pancreatitis  RUQ U/S reviewed showing gall bladder wall thickening 6-7mm most likely in the setting of ascites with acute cholecystitis less likely, CBD 4mm, negative sanches's    Plan:  Empiric ceftriaxone in ED, hold further unless worsening infectious endpoints  Multimodal analgesia  Trend LFTs  GI consult  Type 2 diabetes mellitus with hyperglycemia, with long-term current use of insulin ([facility])  Lab Results   Component Value Date    HGBA1C 9.8 (H) 02/05/2025     Recent Labs     04/26/25  1600 04/26/25  1832   POCGLU 443* 388*   Blood Sugar Average: Last 72 hrs:  (P) 415.5    Elevated in setting of missed medications  outpatient regimen: NPH 4u BID    Plan:  BG goal 140-200 while inpatient  SSI  Holding oral antihyperglycemics  Uncomplicated alcohol dependence ([facility])  History of alcohol use, did not quantify use, last drink approximately 20 days ago. History of withdrawal and possible withdrawal seizure    Plan:  Monitor for evidence of withdrawal   Alcohol use may be cause of above problems  Complete abstinence   Hyponatremia  Pseudohyponatremia in setting of hyperglycemia, corrected sodium 133  Hypertensive urgency  Asymptomatic in setting of missed medications   outpatient regimen: amlodipine 5mg daily, hydralazine 25mg q8h  CKD stage 3a, GFR 45-59 ml/min ([facility])  Lab Results   Component Value Date    EGFR 50 04/26/2025    EGFR 38 03/18/2025    EGFR 40 03/17/2025    CREATININE 1.52 (H) 04/26/2025    CREATININE 1.90 (H) 03/18/2025     CREATININE 1.84 (H) 03/17/2025     CKD in setting of hypertensive, diabetic nephropathy   Baseline Cr: 1.3-1.5  Admit Cr: 1.52     Plan:  1L NS in ED  Monitor renal function, renal dose medications, maintain normotension/euvolemia, avoid nephrotoxic agents, I&Os    VTE Pharmacologic Prophylaxis: VTE Score: 3 Moderate Risk (Score 3-4) - Pharmacological DVT Prophylaxis Ordered: heparin.  Code Status: Prior   Discussion with family:   Anticipated Length of Stay: Patient will be admitted on an inpatient basis with an anticipated length of stay of greater than 2 midnights secondary to transaminitis.    History of Present Illness   Chief Complaint:   Chief Complaint   Patient presents with    Hyperglycemia - Symptomatic     Arrrives via EMS, reports blood sugar 500s, abd pain and back pain, vomiting/n/d for 10 days. Patient appears anxious. 15 lb weight gain in 1 month noted in triage, abd distended which is not baseline per patient. Last BM today. Reports daily drinking but last drink was 20 days ago. Reports not taking insulin for past 3 days due to not feeling well     Wes Araujo is a 55 y.o. male with a PMH of alcohol abuse, HTN, chronic pancreatitis, T2DM who presents with feeling unwell.   History obtained from patient, chart review and discussion with ED resident. The patient is a poor historian and hard of hearing making the online [place]ian interpretation service difficult to use. He was resting in bed looking at his cell phone on exam. He presents tonight for evaluation of nausea/vomiting, abdominal pain and high blood sugars. This began over this week. No inciting event, injury or sick contacts. His pain is sharp and mainly in the upper part of his abdomen, does not radiate. Nothing makes worse, found some improvement with pain medications in ED. Has been intermittently eating but has not been using his insulin all week. Did have episodes of loose stools but today was normal. Did not take blood pressure  medications today because he felt unwell. No tobacco use, reported alcohol use but none recent, no illicit drug use.     Review of Systems   Constitutional:  Negative for chills and fever.   Respiratory:  Negative for shortness of breath.    Cardiovascular:  Negative for chest pain and palpitations.   Gastrointestinal:  Positive for abdominal distention, abdominal pain and nausea. Negative for diarrhea and vomiting.   Neurological:  Negative for syncope.   All other systems reviewed and are negative.      Historical Information   Past Medical History:   Diagnosis Date    Alcohol abuse     Chronic pancreatitis (HCC)     Diabetes mellitus (HCC)     Type 2    Pancreatitis     Paroxysmal A-fib (HCC)     Thrombocytopenia (HCC)      Past Surgical History:   Procedure Laterality Date    INCISION AND DRAINAGE OF WOUND N/A 8/16/2020    Procedure: INCISION AND DRAINAGE (I&D) HEAD/FACE;  Surgeon: Estrada Walton DMD;  Location: [facility];  Service: Maxillofacial    IR BIOPSY BONE MARROW  2/7/2019    REMOVAL OF IMPACTED TOOTH - COMPLETELY BONY N/A 8/16/2020    Procedure: EXTRACTION TEETH MULTIPLE #2, 3;  Surgeon: Estrada Walton DMD;  Location: [facility];  Service: Maxillofacial     Social History     Tobacco Use    Smoking status: Former     Passive exposure: Past    Smokeless tobacco: Never   Vaping Use    Vaping status: Never Used   Substance and Sexual Activity    Alcohol use: Yes    Drug use: Not Currently     Types: Cocaine    Sexual activity: Not on file     E-Cigarette/Vaping    E-Cigarette Use Never User      E-Cigarette/Vaping Substances    Nicotine No     THC No     CBD No     Flavoring No     Other No     Unknown No      Family History   Problem Relation Age of Onset    Diabetes Mother     Hypertension Mother     Hyperlipidemia Father      Social History:  Marital Status: Single   Occupation:   Patient Pre-hospital Living Situation: Home  Patient Pre-hospital Level of Mobility: walks  Patient Pre-hospital Diet  "Restrictions:     Meds/Allergies   I have reviewed home medications with patient personally.  Prior to Admission medications    Medication Sig Start Date End Date Taking? Authorizing Provider   Alcohol Swabs 70 % PADS May substitute brand based on insurance coverage. Check glucose TID. 3/18/25   Johny S Prechtel, DO   amLODIPine (NORVASC) 5 mg tablet Take 1 tablet (5 mg total) by mouth 2 (two) times a day 3/18/25 5/17/25  Johny S Prechtel, DO   Blood Glucose Monitoring Suppl (OneTouch Verio Reflect) w/Device KIT May substitute brand based on insurance coverage. Check glucose TID. 3/18/25   Johny S Prechtel, DO   folic acid ( Folic Acid) 1 mg tablet Take 1 tablet (1 mg total) by mouth daily 3/18/25 4/17/25  Johny S Prechtel, DO   glucose blood (OneTouch Verio) test strip May substitute brand based on insurance coverage. Check glucose TID. 3/18/25   Johny S Prechtel, DO   hydrALAZINE (APRESOLINE) 25 mg tablet Take 1 tablet (25 mg total) by mouth every 8 (eight) hours 3/18/25 5/17/25  Johny S Prechtel, DO   insulin NPH-insulin regular (HumuLIN 70/30) 100 units/mL subcutaneous injection Inject 5 Units under the skin 2 (two) times a day before meals Do not give yourself insulin if you are not going to eat. 3/18/25   Johny S Prechtel, DO   Insulin Syringe-Needle U-100 (BD Insulin Syringe U/F) 31G X 5/16\" 0.3 ML MISC For use with insulin. Pharmacy may dispense brand covered by insurance. 3/18/25   Johny S Prechtel, DO   Multiple Vitamin (multivitamin) tablet Take 1 tablet by mouth daily 1/27/24 2/26/24  MD Donta AguirreTouch Delica Lancets 33G MISC May substitute brand based on insurance coverage. Check glucose TID. 3/18/25   Johny S Prechtel, DO   pancrelipase, Lip-Prot-Amyl, (CREON) 24,000 units Take 24,000 units of lipase by mouth 3 (three) times a day with meals 10/26/24   Fannie Cowarto, MD   thiamine 50 MG tablet Take 1 tablet (50 mg total) by mouth daily 10/26/24   " Fannie Hernandez MD     No Known Allergies    Objective :  Temp:  [98.9 °F (37.2 °C)] 98.9 °F (37.2 °C)  HR:  [84-97] 84  BP: (181-231)/() 181/90  Resp:  [16-22] 18  SpO2:  [98 %-100 %] 98 %  O2 Device: None (Room air)    Physical Exam  Vitals and nursing note reviewed.   Constitutional:       General: He is not in acute distress.     Appearance: He is well-developed.   HENT:      Head: Normocephalic and atraumatic.   Eyes:      Conjunctiva/sclera: Conjunctivae normal.   Cardiovascular:      Rate and Rhythm: Normal rate and regular rhythm.      Heart sounds: No murmur heard.  Pulmonary:      Effort: Pulmonary effort is normal. No respiratory distress.      Breath sounds: Normal breath sounds.   Abdominal:      General: There is distension.      Palpations: Abdomen is soft.      Tenderness: There is abdominal tenderness.   Musculoskeletal:         General: No swelling.      Cervical back: Neck supple.   Skin:     General: Skin is warm and dry.      Capillary Refill: Capillary refill takes less than 2 seconds.   Neurological:      Mental Status: He is alert and oriented to person, place, and time.   Psychiatric:         Mood and Affect: Mood normal.          Lines/Drains:            Lab Results: I have reviewed the following results:  Results from last 7 days   Lab Units 04/26/25  1615   WBC Thousand/uL 10.20*   HEMOGLOBIN g/dL 11.0*   HEMATOCRIT % 30.9*   PLATELETS Thousands/uL 235   SEGS PCT % 62   LYMPHO PCT % 24   MONO PCT % 8   EOS PCT % 5     Results from last 7 days   Lab Units 04/26/25  1615   SODIUM mmol/L 127*   POTASSIUM mmol/L 3.7   CHLORIDE mmol/L 100   CO2 mmol/L 19*   BUN mg/dL 12   CREATININE mg/dL 1.52*   ANION GAP mmol/L 8   CALCIUM mg/dL 8.3*   ALBUMIN g/dL 2.8*   TOTAL BILIRUBIN mg/dL 1.89*   ALK PHOS U/L 639*   ALT U/L 60*   AST U/L 81*   GLUCOSE RANDOM mg/dL 489*         Results from last 7 days   Lab Units 04/26/25  1832 04/26/25  1600   POC GLUCOSE mg/dl 388* 443*      Lab Results   Component Value Date    HGBA1C 9.8 (H) 02/05/2025    HGBA1C 11.2 (H) 10/23/2024    HGBA1C 15.7 (H) 01/26/2024     Results from last 7 days   Lab Units 04/26/25  1830 04/26/25  1615   LACTIC ACID mmol/L 1.2 2.1*       Imaging Results Review: I personally reviewed the following image studies in PACS and associated radiology reports: CT abdomen/pelvis. My interpretation of the radiology images/reports is: CT abd/pelvis reviewed showing small bowel thickening suggesting enteritis, mild mesenteric edema and small volume ascites and stable findings of chronic pancreatitis.  Other Study Results Review: EKG was personally reviewed and my interpretation is: NSR. HR 97..    Administrative Statements     ** Please Note: This note has been constructed using a voice recognition system. **

## 2025-04-27 NOTE — PROGRESS NOTES
Progress Note - Hospitalist   Name: Wes Araujo 55 y.o. male I MRN: [id number]  Unit/Bed#: E2 -01 I Date of Admission: 4/26/2025   Date of Service: 4/27/2025 I Hospital Day: 1    Assessment & Plan  Abdominal pain  Patient is a 55-year-old male with past medical history significant for alcohol abuse, hypertension, diabetes, and chronic kidney disease who presented to the ER with abdominal pain, nausea and vomiting     Complains of right upper quadrant abdominal pain with recent nausea/vomiting  CT abd/pelvis reviewed showing small bowel thickening suggesting enteritis, mild mesenteric edema and small volume ascites and stable findings of chronic pancreatitis  RUQ U/S: showing gall bladder wall thickening 6-7mm most likely in the setting of ascites with acute cholecystitis less likely, CBD 4mm, negative sanches's  Patient had transaminitis with AST 81: ALT 60, alkaline phosphatase 639 and total bilirubin 1.89  No diarrhea, with question of enteritis on CT  Patient was evaluated by the GI team: Noted to have moderate-large ascites  IR consulted for diagnostic and therapeutic paracentesis  If evaluation unremarkable they recommend HIDA scan  Transaminitis  Patient with a history of alcohol abuse  Presented with a transaminitis with AST: ALT ratio 1: 1  Total bilirubin 1.89  Normal INR  Not likely consistent with alcoholic hepatitis  Workup as above  Check acute hepatitis panel  Alcohol abuse  History of alcohol use disorder, with history of withdrawal and withdrawal seizures  Patient repeatedly states his last drink was 20 days ago  CIWA  Thiamine/folic acid  No current signs or symptoms of withdrawal    Type 2 diabetes mellitus with hyperglycemia, with long-term current use of insulin (HCC)  Lab Results   Component Value Date    HGBA1C 9.8 (H) 02/05/2025     Recent Labs     04/26/25  2327 04/27/25  0754 04/27/25  1123 04/27/25  1528   POCGLU 344* 154* 200* 183*   Blood Sugar Average: Last 72 hrs:  (P)  [id number]  outpatient regimen: NPH 4u BID  Patient presented with hyperglycemia secondary to missed medications  Was restarted on his home regimen, plus Accu-Cheks and sliding scale insulin with improvement  Hyponatremia  Pseudohyponatremia in setting of hyperglycemia, plus history of chronic hyponatremia secondary to excessive free water due to alcohol  Normalized  CKD stage 3a, GFR 45-59 ml/min ([facility])  Lab Results   Component Value Date    EGFR 57 04/27/2025    EGFR 50 04/26/2025    EGFR 38 03/18/2025    CREATININE 1.38 (H) 04/27/2025    CREATININE 1.52 (H) 04/26/2025    CREATININE 1.90 (H) 03/18/2025     CKD in setting of hypertensive, diabetic nephropathy   Baseline Cr: 1.3-1.5  Admit Cr: 1.52     Paroxysmal A-fib ([facility])  History of paroxysmal atrial fibrillation  Currently in sinus rhythm  Patient not prescribed any rate control medications  Not on anticoagulation  Seizure ([facility])  Patient was admitted 10/2024 with witnessed tonic-clonic seizures  Diagnosis metabolic, possibly related to alcohol withdrawal: Was eval by neurology and was not recommended to start antiepileptic therapy  Primary hypertension  Patient presented with markedly elevated blood pressure in the setting of missed medications  Home regimen restarted: Amlodipine 5mg daily, hydralazine 25mg q8h  Continue to monitor and titrate to goal  Anemia  Patient with a history of anemia  Prior EGD with Dieulafoy's lesions  Baseline hemoglobin extremely variable ranging 7-10  Currently hemoglobin 9.6, without evidence of bleeding  Continue to monitor  Alcohol-induced chronic pancreatitis ([facility])  Continue supportive care    VTE Pharmacologic Prophylaxis: VTE Score: 3 Moderate Risk (Score 3-4) - Pharmacological DVT Prophylaxis Ordered: heparin.    Mobility:   Basic Mobility Inpatient Raw Score: 23  JH-HLM Goal: 7: Walk 25 feet or more  JH-HLM Achieved: 7: Walk 25 feet or more  JH-HLM Goal achieved. Continue to encourage appropriate  mobility.    Patient Centered Rounds: I performed bedside rounds with nursing staff today.   Discussions with Specialists or Other Care Team Provider: ZOILA    Education and Discussions with Family / Patient: called father Wes Araujo:  MAITE to call for update    Current Length of Stay: 1 day(s)  Current Patient Status: Inpatient   Certification Statement: The patient will continue to require additional inpatient hospital stay due to elevated LFT  Discharge Plan: Anticipate discharge in 24-48 hrs to home.    Code Status: Level 1 - Full Code    Subjective   Patient is examined and interviewed by me in Danish at the bedside.  Patient complains of right upper quadrant abdominal pain.  Denies any current nausea or vomiting.  Previous symptoms have improved.  Denies any diarrhea or constipation.  Denies any difficulty breathing.  Denies any pain anywhere else.  Denies feeling dizzy or lightheaded.  Patient states it has been 20 days since his last drink of alcohol.  Denies feeling shaky.  Patient notes previously he had been drinking approximately 7-8 beers a day        Objective :  Temp:  [97.8 °F (36.6 °C)-99.4 °F (37.4 °C)] 99.4 °F (37.4 °C)  HR:  [] 82  BP: (124-231)/() 174/93  Resp:  [16-22] 17  SpO2:  [97 %-100 %] 99 %  O2 Device: None (Room air)    Body mass index is 21.49 kg/m².     Input and Output Summary (last 24 hours):     Intake/Output Summary (Last 24 hours) at 4/27/2025 1534  Last data filed at 4/27/2025 1401  Gross per 24 hour   Intake 1050 ml   Output 300 ml   Net 750 ml       Physical Exam  General: Very pleasant male.  No acute distress.  Nontachypneic and nondyspneic  Heart: Regular rate and rhythm.  S1-S2 present.  No murmur, rub, gallop  Lungs: Clear to auscultation bilaterally.  Good air movement.  No wheezes, crackles, rhonchi.  No accessory muscle use or respiratory distress  Abdomen: Soft, + right upper quadrant, right lower quadrant tenderness with palpation.  No other tenderness  elicited..  Nondistended.  Normoactive bowel sounds present.  No guarding or rebound.  No peritoneal signs or mass  Extremities: No clubbing, cyanosis, edema.  2+ pedal pulses bilaterally  Neurologic: Awake alert.  Oriented.  Fluent speech.  Interactive    Lines/Drains:              Lab Results: I have reviewed the following results:   Results from last 7 days   Lab Units 04/27/25  0456 04/26/25  1615   WBC Thousand/uL 11.44* 10.20*   HEMOGLOBIN g/dL 9.6* 11.0*   HEMATOCRIT % 27.4* 30.9*   PLATELETS Thousands/uL 242 235   SEGS PCT %  --  62   LYMPHO PCT %  --  24   MONO PCT %  --  8   EOS PCT %  --  5     Results from last 7 days   Lab Units 04/27/25  0456   SODIUM mmol/L 135   POTASSIUM mmol/L 3.0*   CHLORIDE mmol/L 107   CO2 mmol/L 23   BUN mg/dL 11   CREATININE mg/dL 1.38*   ANION GAP mmol/L 5   CALCIUM mg/dL 7.8*   ALBUMIN g/dL 2.3*   TOTAL BILIRUBIN mg/dL 0.86   ALK PHOS U/L 527*   ALT U/L 42   AST U/L 35   GLUCOSE RANDOM mg/dL 149*     Results from last 7 days   Lab Units 04/27/25  0829   INR  1.00     Results from last 7 days   Lab Units 04/27/25  1528 04/27/25  1123 04/27/25  0754 04/26/25  2327 04/26/25  1832 04/26/25  1600   POC GLUCOSE mg/dl 183* 200* 154* 344* 388* 443*         Results from last 7 days   Lab Units 04/26/25  1830 04/26/25  1615   LACTIC ACID mmol/L 1.2 2.1*       Recent Cultures (last 7 days):       =================================================  Right upper quadrant ultrasound:  Gallbladder wall thickening in the setting of ascites. Findings most likely reflects the presence of ascites, underlying liver disease, heart failure or hypoproteinemia. Acute cholecystitis is considered less likely.  Moderate ascites.    CT scan abdomen/pelvis  Enteritis.  Mild mesenteric edema and small volume ascites. Small left pleural effusion.  Stable findings of chronic pancreatitis.    Chest x-ray:Low lung volumes with bibasilar atelectasis. No pneumonia on subsequent abdomen CT.  Small pleural  effusions.  ===============================================    Last 24 Hours Medication List:     Current Facility-Administered Medications:     acetaminophen (TYLENOL) tablet 975 mg, Q8H PRN    aluminum-magnesium hydroxide-simethicone (MAALOX) oral suspension 30 mL, Q6H PRN    amLODIPine (NORVASC) tablet 5 mg, BID    folic acid (FOLVITE) tablet 1 mg, Daily    heparin (porcine) subcutaneous injection 5,000 Units, Q8H JAISON    hydrALAZINE (APRESOLINE) tablet 25 mg, Q8H JAISON    HYDROmorphone HCl (DILAUDID) injection 0.2 mg, Q6H PRN    insulin aspart protamine-insulin aspart (NovoLOG 70/30) 100 units/mL subcutaneous injection 4 Units, BID AC    insulin lispro (HumALOG/ADMELOG) 100 units/mL subcutaneous injection 1-5 Units, 4x Daily (AC & HS) **AND** Fingerstick Glucose (POCT), 4x Daily AC and at bedtime    naloxone (NARCAN) 0.04 mg/mL syringe 0.04 mg, Q1MIN PRN    ondansetron (ZOFRAN) injection 4 mg, Q6H PRN    oxyCODONE (ROXICODONE) IR tablet 5 mg, Q6H PRN    oxyCODONE (ROXICODONE) split tablet 2.5 mg, Q6H PRN    polyethylene glycol (MIRALAX) packet 17 g, Daily PRN    thiamine tablet 100 mg, Daily    Administrative Statements   Today, Patient Was Seen By: Mojgan Young MD      **Please Note: This note may have been constructed using a voice recognition system.**

## 2025-04-27 NOTE — ASSESSMENT & PLAN NOTE
Asymptomatic in setting of missed medications   outpatient regimen: amlodipine 5mg daily, hydralazine 25mg q8h

## 2025-04-27 NOTE — ASSESSMENT & PLAN NOTE
Patient with a history of anemia  Prior EGD with Dieulafoy's lesions  Baseline hemoglobin extremely variable ranging 7-10  Currently hemoglobin 9.6, without evidence of bleeding  Continue to monitor

## 2025-04-27 NOTE — ASSESSMENT & PLAN NOTE
Patient presented with markedly elevated blood pressure in the setting of missed medications  Home regimen restarted: Amlodipine 5mg daily, hydralazine 25mg q8h  Continue to monitor and titrate to goal

## 2025-04-27 NOTE — ASSESSMENT & PLAN NOTE
Patient is a 55-year-old male with past medical history significant for alcohol abuse, hypertension, diabetes, and chronic kidney disease who presented to the ER with abdominal pain, nausea and vomiting     Complains of right upper quadrant abdominal pain with recent nausea/vomiting  CT abd/pelvis reviewed showing small bowel thickening suggesting enteritis, mild mesenteric edema and small volume ascites and stable findings of chronic pancreatitis  RUQ U/S: showing gall bladder wall thickening 6-7mm most likely in the setting of ascites with acute cholecystitis less likely, CBD 4mm, negative sanches's  Patient had transaminitis with AST 81: ALT 60, alkaline phosphatase 639 and total bilirubin 1.89  No diarrhea, with question of enteritis on CT  Patient was evaluated by the GI team: Noted to have moderate-large ascites  IR consulted for diagnostic and therapeutic paracentesis  If evaluation unremarkable they recommend HIDA scan

## 2025-04-27 NOTE — ASSESSMENT & PLAN NOTE
Lab Results   Component Value Date    HGBA1C 9.8 (H) 02/05/2025       Recent Labs     04/26/25  1600 04/26/25  1832 04/26/25  2327   POCGLU 443* 388* 344*       Blood Sugar Average: Last 72 hrs:  (P) [id number]

## 2025-04-27 NOTE — ASSESSMENT & PLAN NOTE
Lab Results   Component Value Date    EGFR 57 04/27/2025    EGFR 50 04/26/2025    EGFR 38 03/18/2025    CREATININE 1.38 (H) 04/27/2025    CREATININE 1.52 (H) 04/26/2025    CREATININE 1.90 (H) 03/18/2025     CKD in setting of hypertensive, diabetic nephropathy   Baseline Cr: 1.3-1.5  Admit Cr: 1.52

## 2025-04-27 NOTE — ASSESSMENT & PLAN NOTE
History of alcohol use disorder, with history of withdrawal and withdrawal seizures  Patient repeatedly states his last drink was 20 days ago  CIWA  Thiamine/folic acid  No current signs or symptoms of withdrawal

## 2025-04-27 NOTE — ASSESSMENT & PLAN NOTE
55 y.o. male with history of hypertension, CKD, type 2 diabetes mellitus, chronic pancreatitis and alcohol abuse who presented on 4/26 due to abdominal pain, nausea and vomiting after not taking his insulin in 3 days found to have elevated liver enzymes on admission with AST 81, ALT 60, alkaline phosphatase 639, total bilirubin 1.89 with ZENAIDA and right upper quadrant ultrasound notable for circumferential wall thickening of the gallbladder without stones or sludge with pericholecystic fluid in the setting of ascites with normal CBD and moderate to large ascites overall concerning for previous episode of alcoholic hepatitis resulting in ascites with possible SBP versus viral gastroenteritis versus acute cholecystitis leading to GI consult.      - Trend liver enzymes  - INR 1.0 making alcoholic hepatitis less likely.  Recommend trending  - Recommend IR consultation for diagnostic and therapeutic paracentesis  - Start bowel regiment once diarrhea improves with MiraLAX daily  - Counseled on importance of alcohol cessation  - Previous hepatitis serologies nonreactive  - If no other etiology elucidated consider HIDA scan and surgery consult

## 2025-04-27 NOTE — ASSESSMENT & PLAN NOTE
Lab Results   Component Value Date    EGFR 50 04/26/2025    EGFR 38 03/18/2025    EGFR 40 03/17/2025    CREATININE 1.52 (H) 04/26/2025    CREATININE 1.90 (H) 03/18/2025    CREATININE 1.84 (H) 03/17/2025     CKD in setting of hypertensive, diabetic nephropathy   Baseline Cr: 1.3-1.5  Admit Cr: 1.52     Plan:  1L NS in ED  Monitor renal function, renal dose medications, maintain normotension/euvolemia, avoid nephrotoxic agents, I&Os

## 2025-04-28 ENCOUNTER — APPOINTMENT (INPATIENT)
Dept: RADIOLOGY | Facility: HOSPITAL | Age: 55
DRG: 251 | End: 2025-04-28
Attending: STUDENT IN AN ORGANIZED HEALTH CARE EDUCATION/TRAINING PROGRAM
Payer: COMMERCIAL

## 2025-04-28 LAB
ALBUMIN SERPL BCG-MCNC: 2.4 G/DL (ref 3.5–5)
ALP SERPL-CCNC: 541 U/L (ref 34–104)
ALT SERPL W P-5'-P-CCNC: 38 U/L (ref 7–52)
ANION GAP SERPL CALCULATED.3IONS-SCNC: 4 MMOL/L (ref 4–13)
AST SERPL W P-5'-P-CCNC: 33 U/L (ref 13–39)
BASOPHILS # BLD AUTO: 0.05 THOUSANDS/ÂΜL (ref 0–0.1)
BASOPHILS NFR BLD AUTO: 1 % (ref 0–1)
BILIRUB SERPL-MCNC: 1.64 MG/DL (ref 0.2–1)
BUN SERPL-MCNC: 15 MG/DL (ref 5–25)
CALCIUM ALBUM COR SERPL-MCNC: 9.2 MG/DL (ref 8.3–10.1)
CALCIUM SERPL-MCNC: 7.9 MG/DL (ref 8.4–10.2)
CHLORIDE SERPL-SCNC: 105 MMOL/L (ref 96–108)
CO2 SERPL-SCNC: 24 MMOL/L (ref 21–32)
CREAT SERPL-MCNC: 1.92 MG/DL (ref 0.6–1.3)
EOSINOPHIL # BLD AUTO: 0.64 THOUSAND/ÂΜL (ref 0–0.61)
EOSINOPHIL NFR BLD AUTO: 6 % (ref 0–6)
ERYTHROCYTE [DISTWIDTH] IN BLOOD BY AUTOMATED COUNT: 11.9 % (ref 11.6–15.1)
GFR SERPL CREATININE-BSD FRML MDRD: 38 ML/MIN/1.73SQ M
GGT SERPL-CCNC: 1920 U/L (ref 9–64)
GLUCOSE SERPL-MCNC: 106 MG/DL (ref 65–140)
GLUCOSE SERPL-MCNC: 140 MG/DL (ref 65–140)
GLUCOSE SERPL-MCNC: 154 MG/DL (ref 65–140)
GLUCOSE SERPL-MCNC: 237 MG/DL (ref 65–140)
GLUCOSE SERPL-MCNC: 251 MG/DL (ref 65–140)
HAV IGM SER QL: NORMAL
HBV CORE IGM SER QL: NORMAL
HBV SURFACE AG SER QL: NORMAL
HCT VFR BLD AUTO: 28.1 % (ref 36.5–49.3)
HCV AB SER QL: NORMAL
HGB BLD-MCNC: 9.8 G/DL (ref 12–17)
IMM GRANULOCYTES # BLD AUTO: 0.07 THOUSAND/UL (ref 0–0.2)
IMM GRANULOCYTES NFR BLD AUTO: 1 % (ref 0–2)
INR PPP: 0.96 (ref 0.85–1.19)
LYMPHOCYTES # BLD AUTO: 2.41 THOUSANDS/ÂΜL (ref 0.6–4.47)
LYMPHOCYTES NFR BLD AUTO: 23 % (ref 14–44)
MCH RBC QN AUTO: 30.7 PG (ref 26.8–34.3)
MCHC RBC AUTO-ENTMCNC: 34.9 G/DL (ref 31.4–37.4)
MCV RBC AUTO: 88 FL (ref 82–98)
MONOCYTES # BLD AUTO: 0.86 THOUSAND/ÂΜL (ref 0.17–1.22)
MONOCYTES NFR BLD AUTO: 8 % (ref 4–12)
NEUTROPHILS # BLD AUTO: 6.47 THOUSANDS/ÂΜL (ref 1.85–7.62)
NEUTS SEG NFR BLD AUTO: 61 % (ref 43–75)
NRBC BLD AUTO-RTO: 0 /100 WBCS
PLATELET # BLD AUTO: 243 THOUSANDS/UL (ref 149–390)
PMV BLD AUTO: 11.3 FL (ref 8.9–12.7)
POTASSIUM SERPL-SCNC: 4.2 MMOL/L (ref 3.5–5.3)
PROT SERPL-MCNC: 5.1 G/DL (ref 6.4–8.4)
PROTHROMBIN TIME: 13 SECONDS (ref 12.3–15)
RBC # BLD AUTO: 3.19 MILLION/UL (ref 3.88–5.62)
SODIUM SERPL-SCNC: 133 MMOL/L (ref 135–147)
WBC # BLD AUTO: 10.5 THOUSAND/UL (ref 4.31–10.16)

## 2025-04-28 PROCEDURE — NC001 PR NO CHARGE: Performed by: STUDENT IN AN ORGANIZED HEALTH CARE EDUCATION/TRAINING PROGRAM

## 2025-04-28 PROCEDURE — 85025 COMPLETE CBC W/AUTO DIFF WBC: CPT | Performed by: INTERNAL MEDICINE

## 2025-04-28 PROCEDURE — 82948 REAGENT STRIP/BLOOD GLUCOSE: CPT

## 2025-04-28 PROCEDURE — 99232 SBSQ HOSP IP/OBS MODERATE 35: CPT

## 2025-04-28 PROCEDURE — 80053 COMPREHEN METABOLIC PANEL: CPT | Performed by: STUDENT IN AN ORGANIZED HEALTH CARE EDUCATION/TRAINING PROGRAM

## 2025-04-28 PROCEDURE — 85610 PROTHROMBIN TIME: CPT | Performed by: STUDENT IN AN ORGANIZED HEALTH CARE EDUCATION/TRAINING PROGRAM

## 2025-04-28 PROCEDURE — 76937 US GUIDE VASCULAR ACCESS: CPT

## 2025-04-28 PROCEDURE — 0WJG3ZZ INSPECTION OF PERITONEAL CAVITY, PERCUTANEOUS APPROACH: ICD-10-PCS | Performed by: STUDENT IN AN ORGANIZED HEALTH CARE EDUCATION/TRAINING PROGRAM

## 2025-04-28 PROCEDURE — 94762 N-INVAS EAR/PLS OXIMTRY CONT: CPT

## 2025-04-28 PROCEDURE — 99255 IP/OBS CONSLTJ NEW/EST HI 80: CPT | Performed by: INTERNAL MEDICINE

## 2025-04-28 PROCEDURE — 80074 ACUTE HEPATITIS PANEL: CPT | Performed by: INTERNAL MEDICINE

## 2025-04-28 RX ORDER — ALBUMIN (HUMAN) 12.5 G/50ML
12.5 SOLUTION INTRAVENOUS ONCE
Status: COMPLETED | OUTPATIENT
Start: 2025-04-28 | End: 2025-04-28

## 2025-04-28 RX ORDER — SODIUM CHLORIDE, SODIUM GLUCONATE, SODIUM ACETATE, POTASSIUM CHLORIDE, MAGNESIUM CHLORIDE, SODIUM PHOSPHATE, DIBASIC, AND POTASSIUM PHOSPHATE .53; .5; .37; .037; .03; .012; .00082 G/100ML; G/100ML; G/100ML; G/100ML; G/100ML; G/100ML; G/100ML
75 INJECTION, SOLUTION INTRAVENOUS CONTINUOUS
Status: DISCONTINUED | OUTPATIENT
Start: 2025-04-28 | End: 2025-04-28

## 2025-04-28 RX ORDER — SODIUM CHLORIDE, SODIUM GLUCONATE, SODIUM ACETATE, POTASSIUM CHLORIDE, MAGNESIUM CHLORIDE, SODIUM PHOSPHATE, DIBASIC, AND POTASSIUM PHOSPHATE .53; .5; .37; .037; .03; .012; .00082 G/100ML; G/100ML; G/100ML; G/100ML; G/100ML; G/100ML; G/100ML
75 INJECTION, SOLUTION INTRAVENOUS CONTINUOUS
Status: DISCONTINUED | OUTPATIENT
Start: 2025-04-28 | End: 2025-04-29

## 2025-04-28 RX ADMIN — OXYCODONE 5 MG: 5 TABLET ORAL at 21:19

## 2025-04-28 RX ADMIN — Medication 100 MG: at 08:00

## 2025-04-28 RX ADMIN — OXYCODONE 5 MG: 5 TABLET ORAL at 05:42

## 2025-04-28 RX ADMIN — INSULIN LISPRO 2 UNITS: 100 INJECTION, SOLUTION INTRAVENOUS; SUBCUTANEOUS at 07:42

## 2025-04-28 RX ADMIN — ALBUMIN (HUMAN) 12.5 G: 0.25 INJECTION, SOLUTION INTRAVENOUS at 16:50

## 2025-04-28 RX ADMIN — HYDRALAZINE HYDROCHLORIDE 25 MG: 25 TABLET ORAL at 23:39

## 2025-04-28 RX ADMIN — INSULIN LISPRO 1 UNITS: 100 INJECTION, SOLUTION INTRAVENOUS; SUBCUTANEOUS at 21:20

## 2025-04-28 RX ADMIN — SODIUM CHLORIDE, SODIUM GLUCONATE, SODIUM ACETATE, POTASSIUM CHLORIDE, MAGNESIUM CHLORIDE, SODIUM PHOSPHATE, DIBASIC, AND POTASSIUM PHOSPHATE 75 ML/HR: .53; .5; .37; .037; .03; .012; .00082 INJECTION, SOLUTION INTRAVENOUS at 16:54

## 2025-04-28 RX ADMIN — AMLODIPINE BESYLATE 5 MG: 5 TABLET ORAL at 17:06

## 2025-04-28 RX ADMIN — HEPARIN SODIUM 5000 UNITS: 5000 INJECTION INTRAVENOUS; SUBCUTANEOUS at 15:47

## 2025-04-28 RX ADMIN — INSULIN ASPART 4 UNITS: 100 INJECTION, SUSPENSION SUBCUTANEOUS at 16:36

## 2025-04-28 RX ADMIN — AMLODIPINE BESYLATE 5 MG: 5 TABLET ORAL at 08:00

## 2025-04-28 RX ADMIN — HYDRALAZINE HYDROCHLORIDE 25 MG: 25 TABLET ORAL at 15:47

## 2025-04-28 RX ADMIN — HYDROMORPHONE HYDROCHLORIDE 0.2 MG: 0.2 INJECTION, SOLUTION INTRAMUSCULAR; INTRAVENOUS; SUBCUTANEOUS at 07:52

## 2025-04-28 RX ADMIN — HEPARIN SODIUM 5000 UNITS: 5000 INJECTION INTRAVENOUS; SUBCUTANEOUS at 21:20

## 2025-04-28 RX ADMIN — FOLIC ACID 1 MG: 1 TABLET ORAL at 08:00

## 2025-04-28 RX ADMIN — HEPARIN SODIUM 5000 UNITS: 5000 INJECTION INTRAVENOUS; SUBCUTANEOUS at 05:43

## 2025-04-28 RX ADMIN — HYDRALAZINE HYDROCHLORIDE 25 MG: 25 TABLET ORAL at 05:42

## 2025-04-28 RX ADMIN — INSULIN ASPART 4 UNITS: 100 INJECTION, SUSPENSION SUBCUTANEOUS at 07:43

## 2025-04-28 NOTE — PROGRESS NOTES
Progress Note - Hospitalist   Name: Wes Araujo 55 y.o. male I MRN: [id number]  Unit/Bed#: E2 -01 I Date of Admission: 4/26/2025   Date of Service: 4/28/2025 I Hospital Day: 2    Assessment & Plan  Abdominal pain  Patient is a 55-year-old male with past medical history significant for alcohol abuse, hypertension, diabetes, and chronic kidney disease who presented to the ER with abdominal pain, nausea and vomiting     CT abd/pelvis reviewed showing small bowel thickening suggesting enteritis, mild mesenteric edema and small volume ascites and stable findings of chronic pancreatitis  RUQ U/S: showing gall bladder wall thickening 6-7mm most likely in the setting of ascites with acute cholecystitis less likely, CBD 4mm, negative sanches's  Patient had transaminitis with AST 81: ALT 60, alkaline phosphatase 639 and total bilirubin 1.89  No diarrhea, with question of enteritis on CT  Patient was evaluated by the GI team: Noted to have moderate-large ascites  IR consulted for diagnostic and therapeutic paracentesis  If evaluation unremarkable they recommend HIDA scan  Transaminitis  Patient with a history of alcohol abuse  Presented with a transaminitis with AST: ALT ratio 1: 1  Total bilirubin 1.89  Normal INR  Not likely consistent with alcoholic hepatitis  Workup as above  Check acute hepatitis panel-pending  Alcohol abuse  History of alcohol use disorder, with history of withdrawal and withdrawal seizures  Patient repeatedly states his last drink was 20 days ago  CIWA  Thiamine/folic acid  No current signs or symptoms of withdrawal    Type 2 diabetes mellitus with hyperglycemia, with long-term current use of insulin (HCC)  Lab Results   Component Value Date    HGBA1C 9.8 (H) 02/05/2025     Recent Labs     04/27/25  1123 04/27/25  1528 04/27/25  2055 04/28/25  0620   POCGLU 200* 183* 142* 237*   Blood Sugar Average: Last 72 hrs:  (P) 261.375  outpatient regimen: NPH 4u BID  Patient presented with hyperglycemia  secondary to missed medications  Was restarted on his home regimen, plus Accu-Cheks and sliding scale insulin with improvement  Hyponatremia  Pseudohyponatremia in setting of hyperglycemia, plus history of chronic hyponatremia secondary to excessive free water due to alcohol  Improving  CKD stage 3a, GFR 45-59 ml/min ([facility])  Lab Results   Component Value Date    EGFR 38 04/28/2025    EGFR 57 04/27/2025    EGFR 50 04/26/2025    CREATININE 1.92 (H) 04/28/2025    CREATININE 1.38 (H) 04/27/2025    CREATININE 1.52 (H) 04/26/2025     CKD in setting of hypertensive, diabetic nephropathy   Baseline Cr: 1.3-1.5  Admit Cr: 1.52 which is now increased to 1.92.  Nephrology consulted will appreciate recs    Paroxysmal A-fib ([facility])  History of paroxysmal atrial fibrillation  Currently in sinus rhythm  Patient not prescribed any rate control medications  Not on anticoagulation  Seizure ([facility])  Patient was admitted 10/2024 with witnessed tonic-clonic seizures  Diagnosis metabolic, possibly related to alcohol withdrawal: Was eval by neurology and was not recommended to start antiepileptic therapy  Primary hypertension  Patient presented with markedly elevated blood pressure in the setting of missed medications  Home regimen restarted: Amlodipine 5mg daily, hydralazine 25mg q8h  Continue to monitor and titrate to goal  Anemia  Patient with a history of anemia  Prior EGD with Dieulafoy's lesions  Baseline hemoglobin extremely variable ranging 7-10  Currently hemoglobin 9.6, without evidence of bleeding  Continue to monitor  Alcohol-induced chronic pancreatitis ([facility])  Continue supportive care    VTE Pharmacologic Prophylaxis: VTE Score: 3  heparin    Mobility:   Basic Mobility Inpatient Raw Score: 24  JH-HLM Goal: 8: Walk 250 feet or more  JH-HLM Achieved: 7: Walk 25 feet or more      Education and Discussions with Family / Patient: Updated  (father) at bedside.    Current Length of Stay: 2 day(s)  Current Patient Status:  Inpatient     Discharge Plan: Anticipate discharge in 48-72 hrs to home.    Code Status: Level 1 - Full Code    Subjective   Patient seen and examined at bedside.  Translation services were used ID [id number], patient is hard of hearing therefore father also helped with translation services and communication with the patient.  Stated that abdominal pain is still there but now only 5% of it is left.  Denied any nausea or vomiting at the moment.    Objective :  Temp:  [97 °F (36.1 °C)-99.4 °F (37.4 °C)] 97 °F (36.1 °C)  HR:  [79-91] 91  BP: (143-176)/(80-93) 143/88  Resp:  [16-20] 20  SpO2:  [97 %-99 %] 97 %  O2 Device: None (Room air)    Body mass index is 21.49 kg/m².     Input and Output Summary (last 24 hours):     Intake/Output Summary (Last 24 hours) at 4/28/2025 1035  Last data filed at 4/28/2025 0801  Gross per 24 hour   Intake 720 ml   Output 1000 ml   Net -280 ml       Physical Exam  Vitals and nursing note reviewed.   Constitutional:       General: He is not in acute distress.     Appearance: He is well-developed.   HENT:      Head: Normocephalic and atraumatic.   Eyes:      Conjunctiva/sclera: Conjunctivae normal.   Cardiovascular:      Rate and Rhythm: Normal rate and regular rhythm.      Heart sounds: No murmur heard.  Pulmonary:      Effort: Pulmonary effort is normal. No respiratory distress.      Breath sounds: Normal breath sounds.   Abdominal:      General: Abdomen is flat. Bowel sounds are normal.      Palpations: Abdomen is soft.      Tenderness: There is abdominal tenderness.   Musculoskeletal:         General: No swelling.      Cervical back: Neck supple.   Skin:     General: Skin is warm and dry.      Capillary Refill: Capillary refill takes less than 2 seconds.   Neurological:      Mental Status: He is alert and oriented to person, place, and time.   Psychiatric:         Mood and Affect: Mood normal.           Lines/Drains:              Lab Results: I have reviewed the following results:   Results  from last 7 days   Lab Units 04/28/25  0459   WBC Thousand/uL 10.50*   HEMOGLOBIN g/dL 9.8*   HEMATOCRIT % 28.1*   PLATELETS Thousands/uL 243   SEGS PCT % 61   LYMPHO PCT % 23   MONO PCT % 8   EOS PCT % 6     Results from last 7 days   Lab Units 04/28/25  0459   SODIUM mmol/L 133*   POTASSIUM mmol/L 4.2   CHLORIDE mmol/L 105   CO2 mmol/L 24   BUN mg/dL 15   CREATININE mg/dL 1.92*   ANION GAP mmol/L 4   CALCIUM mg/dL 7.9*   ALBUMIN g/dL 2.4*   TOTAL BILIRUBIN mg/dL 1.64*   ALK PHOS U/L 541*   ALT U/L 38   AST U/L 33   GLUCOSE RANDOM mg/dL 251*     Results from last 7 days   Lab Units 04/28/25  0459   INR  0.96     Results from last 7 days   Lab Units 04/28/25  0620 04/27/25  2055 04/27/25  1528 04/27/25  1123 04/27/25  0754 04/26/25  2327 04/26/25  1832 04/26/25  1600   POC GLUCOSE mg/dl 237* 142* 183* 200* 154* 344* 388* 443*         Results from last 7 days   Lab Units 04/26/25  1830 04/26/25  1615   LACTIC ACID mmol/L 1.2 2.1*       Recent Cultures (last 7 days):       XR chest 2 views  Result Date: 4/27/2025  Impression: Low lung volumes with bibasilar atelectasis. No pneumonia on subsequent abdomen CT. Small pleural effusions. Workstation performed: [id number]     US right upper quadrant  Result Date: 4/26/2025  Impression: Gallbladder wall thickening in the setting of ascites. Findings most likely reflects the presence of ascites, underlying liver disease, heart failure or hypoproteinemia. Acute cholecystitis is considered less likely. Moderate ascites. Workstation performed: [id number]     CT abdomen pelvis with contrast  Result Date: 4/26/2025  Impression: Enteritis. Mild mesenteric edema and small volume ascites. Small left pleural effusion. Stable findings of chronic pancreatitis. Workstation performed: [id number]         Last 24 Hours Medication List:     Current Facility-Administered Medications:     acetaminophen (TYLENOL) tablet 975 mg, Q8H PRN    aluminum-magnesium hydroxide-simethicone (MAALOX) oral  suspension 30 mL, Q6H PRN    amLODIPine (NORVASC) tablet 5 mg, BID    folic acid (FOLVITE) tablet 1 mg, Daily    heparin (porcine) subcutaneous injection 5,000 Units, Q8H JAISON    hydrALAZINE (APRESOLINE) tablet 25 mg, Q8H JAISON    HYDROmorphone HCl (DILAUDID) injection 0.2 mg, Q6H PRN    insulin aspart protamine-insulin aspart (NovoLOG 70/30) 100 units/mL subcutaneous injection 4 Units, BID AC    insulin lispro (HumALOG/ADMELOG) 100 units/mL subcutaneous injection 1-5 Units, 4x Daily (AC & HS) **AND** Fingerstick Glucose (POCT), 4x Daily AC and at bedtime    naloxone (NARCAN) 0.04 mg/mL syringe 0.04 mg, Q1MIN PRN    ondansetron (ZOFRAN) injection 4 mg, Q6H PRN    oxyCODONE (ROXICODONE) IR tablet 5 mg, Q6H PRN    oxyCODONE (ROXICODONE) split tablet 2.5 mg, Q6H PRN    polyethylene glycol (MIRALAX) packet 17 g, Daily PRN    thiamine tablet 100 mg, Daily    Administrative Statements   Today, Patient Was Seen By: Jazmín Buchanan MD  I have spent a total time of >35\ minutes in caring for this patient on the day of the visit/encounter including Prognosis, Instructions for management, Risk factor reductions, Counseling / Coordination of care, Documenting in the medical record, Reviewing/placing orders in the medical record (including tests, medications, and/or procedures), and Communicating with other healthcare professionals .    **Please Note: This note may have been constructed using a voice recognition system.**

## 2025-04-28 NOTE — ASSESSMENT & PLAN NOTE
Lab Results   Component Value Date    HGBA1C 9.8 (H) 02/05/2025     Recent Labs     04/27/25  1123 04/27/25  1528 04/27/25 2055 04/28/25  0620   POCGLU 200* 183* 142* 237*   Blood Sugar Average: Last 72 hrs:  (P) 261.375  outpatient regimen: NPH 4u BID  Patient presented with hyperglycemia secondary to missed medications  Was restarted on his home regimen, plus Accu-Cheks and sliding scale insulin with improvement

## 2025-04-28 NOTE — CERTIFIED RECOVERY SPECIALIST
Certified  Note    Patient name: Wes Araujo  Location: [facility]  /E2 -*  [place]: [facility]  Attending:  Jazmín Buchanan MD MRN [id number]  : 1970  Age: 55 y.o.    Sex: male Date 2025         Substance Use History:     Social History     Substance and Sexual Activity   Alcohol Use Yes        Social History     Substance and Sexual Activity   Drug Use Not Currently    Types: Cocaine      CRS attempted to engage - Patient not in room at this time    CRS will revisit tomorrow         Gwen Limon

## 2025-04-28 NOTE — ASSESSMENT & PLAN NOTE
Impression: Regular astigmatism, bilateral: H52.223. Plan: Glasses Rx given. Discussed possible adaptation period. Lab Results   Component Value Date    HGBA1C 9.8 (H) 02/05/2025       Recent Labs     04/27/25  1528 04/27/25 2055 04/28/25  0620 04/28/25  1145   POCGLU 183* 142* 237* 106       Blood Sugar Average: Last 72 hrs:  (P) 244.6581609580698148  Continue glycemic control

## 2025-04-28 NOTE — CONSULTS
Consultation - Nephrology   Name: Wes Araujo 55 y.o. male I MRN: [id number]  Unit/Bed#: E2 -01 I Date of Admission: 4/26/2025   Date of Service: 4/28/2025 I Hospital Day: 2   Inpatient consult to Nephrology  Consult performed by: Fredi Mc DO  Consult ordered by: Jazmín Buchanan MD        Physician Requesting Evaluation: Jazmín Buchanan MD   Reason for Evaluation / Principal Problem: Acute kidney injury    Assessment & Plan  Abdominal pain  Patient continues to have abdominal pain.  His LFTs are elevated as well as his bilirubin and he has pancreatitis.  He is getting a diagnostic and therapeutic paracentesis to rule out SBP.  ZENAIDA (acute kidney injury) ([facility])  His creatinine was around 1.2-1.3 now up to 1.9.  Not on any diuretics, blood pressure stable there may be some mild autoregulatory failure with blood pressure improvement from the 190s down to the 140 systolic range.  Would also worry about prerenal azotemia.  Will place on some maintenance IV fluids for now not significantly azotemic however likely has poor muscle mass.  Electrolytes are stable..  Will check a urine sodium  Primary hypertension  Patient's blood pressure on presentation was 190/60.Patient's home medications were reviewed, not on any ACE or ARB  Type 2 diabetes mellitus with hyperglycemia, with long-term current use of insulin ([facility])  Lab Results   Component Value Date    HGBA1C 9.8 (H) 02/05/2025       Recent Labs     04/27/25  1528 04/27/25  2055 04/28/25  0620 04/28/25  1145   POCGLU 183* 142* 237* 106       Blood Sugar Average: Last 72 hrs:  (P) 244.1937450697021895  Continue glycemic control  Alcohol abuse  Efforts with alcohol cessation  Paroxysmal A-fib (HCC)  Monitor heart rate  Hyponatremia  Corrected for glucose patient's sodium is acceptable  Anemia  In the setting of alcohol abuse  Seizure (HCC)  No current seizure  Transaminitis  Elevated LFTs in the setting of active alcohol use  Alcohol-induced chronic pancreatitis  (HCC)  Some mild volume expansion for now  CKD stage 3a, GFR 45-59 ml/min ([facility])  Lab Results   Component Value Date    EGFR 38 04/28/2025    EGFR 57 04/27/2025    EGFR 50 04/26/2025    CREATININE 1.92 (H) 04/28/2025    CREATININE 1.38 (H) 04/27/2025    CREATININE 1.52 (H) 04/26/2025   Avoiding nephrotoxic agents and monitor urine output, check a urinary retention protocol.      I have reviewed the nephrology recommendations including IV fluids/, with Slim, and we are in agreement with renal plan including the information outlined above.     History of Present Illness   Wes Araujo is a 55 y.o. male who was admitted to [facility] after presenting with abdominal pain. A renal consultation is requested today for assistance in the management of acute kidney injury.  The patient has a history of alcohol abuse.  Is a history of seizures, presented with abdominal pain.  This is diffuse, worse in the right upper contra.  Is a history of pancreatitis.  He has been making urine.  His chronic kidney disease with a baseline creatinine of around 1.2-1.4.  He has no breathing difficulties.  Tolerating some p.o. intake.    Review of Systems   Constitutional:  Negative for chills and fever.   HENT:  Negative for ear pain and sore throat.    Eyes:  Negative for pain and visual disturbance.   Respiratory:  Negative for cough and shortness of breath.    Cardiovascular:  Negative for chest pain and palpitations.   Gastrointestinal:  Negative for abdominal pain and vomiting.   Genitourinary:  Negative for dysuria and hematuria.   Musculoskeletal:  Negative for arthralgias and back pain.   Skin:  Negative for color change and rash.   Neurological:  Negative for seizures and syncope.   All other systems reviewed and are negative.    Historical Information   Past Medical History:   Diagnosis Date    Alcohol abuse     Chronic pancreatitis (HCC)     Diabetes mellitus (HCC)     Type 2    Pancreatitis     Paroxysmal A-fib (HCC)      Thrombocytopenia (HCC)      Past Surgical History:   Procedure Laterality Date    INCISION AND DRAINAGE OF WOUND N/A 8/16/2020    Procedure: INCISION AND DRAINAGE (I&D) HEAD/FACE;  Surgeon: Estrada Walton DMD;  Location: [facility];  Service: Maxillofacial    IR BIOPSY BONE MARROW  2/7/2019    REMOVAL OF IMPACTED TOOTH - COMPLETELY BONY N/A 8/16/2020    Procedure: EXTRACTION TEETH MULTIPLE #2, 3;  Surgeon: Estrada Walton DMD;  Location: [facility];  Service: Maxillofacial     Social History     Tobacco Use    Smoking status: Former     Passive exposure: Past    Smokeless tobacco: Never   Vaping Use    Vaping status: Never Used   Substance and Sexual Activity    Alcohol use: Yes    Drug use: Not Currently     Types: Cocaine    Sexual activity: Not on file     E-Cigarette/Vaping    E-Cigarette Use Never User      E-Cigarette/Vaping Substances    Nicotine No     THC No     CBD No     Flavoring No     Other No     Unknown No      Family History   Problem Relation Age of Onset    Diabetes Mother     Hypertension Mother     Hyperlipidemia Father      Social History     Tobacco Use    Smoking status: Former     Passive exposure: Past    Smokeless tobacco: Never   Vaping Use    Vaping status: Never Used   Substance and Sexual Activity    Alcohol use: Yes    Drug use: Not Currently     Types: Cocaine    Sexual activity: Not on file       Current Facility-Administered Medications:     acetaminophen (TYLENOL) tablet 975 mg, Q8H PRN    aluminum-magnesium hydroxide-simethicone (MAALOX) oral suspension 30 mL, Q6H PRN    amLODIPine (NORVASC) tablet 5 mg, BID    folic acid (FOLVITE) tablet 1 mg, Daily    heparin (porcine) subcutaneous injection 5,000 Units, Q8H JAISON    hydrALAZINE (APRESOLINE) tablet 25 mg, Q8H JAISON    HYDROmorphone HCl (DILAUDID) injection 0.2 mg, Q6H PRN    insulin aspart protamine-insulin aspart (NovoLOG 70/30) 100 units/mL subcutaneous injection 4 Units, BID AC    insulin lispro (HumALOG/ADMELOG) 100 units/mL  "subcutaneous injection 1-5 Units, 4x Daily (AC & HS) **AND** Fingerstick Glucose (POCT), 4x Daily AC and at bedtime    multi-electrolyte (Plasmalyte-A/Isolyte-S PH 7.4/Normosol-R) IV solution, Continuous    naloxone (NARCAN) 0.04 mg/mL syringe 0.04 mg, Q1MIN PRN    ondansetron (ZOFRAN) injection 4 mg, Q6H PRN    oxyCODONE (ROXICODONE) IR tablet 5 mg, Q6H PRN    oxyCODONE (ROXICODONE) split tablet 2.5 mg, Q6H PRN    polyethylene glycol (MIRALAX) packet 17 g, Daily PRN    thiamine tablet 100 mg, Daily  Prior to Admission Medications   Prescriptions Last Dose Informant Patient Reported? Taking?   Alcohol Swabs 70 % PADS   No No   Sig: May substitute brand based on insurance coverage. Check glucose TID.   Blood Glucose Monitoring Suppl (OneTouch Verio Reflect) w/Device KIT   No No   Sig: May substitute brand based on insurance coverage. Check glucose TID.   Insulin Syringe-Needle U-100 (BD Insulin Syringe U/F) 31G X 5/16\" 0.3 ML MISC   No No   Sig: For use with insulin. Pharmacy may dispense brand covered by insurance.   Multiple Vitamin (multivitamin) tablet   No No   Sig: Take 1 tablet by mouth daily   OneTouch Delica Lancets 33G MISC   No No   Sig: May substitute brand based on insurance coverage. Check glucose TID.   amLODIPine (NORVASC) 5 mg tablet   No No   Sig: Take 1 tablet (5 mg total) by mouth 2 (two) times a day   folic acid ( Folic Acid) 1 mg tablet   No No   Sig: Take 1 tablet (1 mg total) by mouth daily   glucose blood (OneTouch Verio) test strip   No No   Sig: May substitute brand based on insurance coverage. Check glucose TID.   hydrALAZINE (APRESOLINE) 25 mg tablet   No No   Sig: Take 1 tablet (25 mg total) by mouth every 8 (eight) hours   insulin NPH-insulin regular (HumuLIN 70/30) 100 units/mL subcutaneous injection   No No   Sig: Inject 5 Units under the skin 2 (two) times a day before meals Do not give yourself insulin if you are not going to eat.   pancrelipase, Lip-Prot-Amyl, (CREON) 24,000 " units   No No   Sig: Take 24,000 units of lipase by mouth 3 (three) times a day with meals   thiamine 50 MG tablet   No No   Sig: Take 1 tablet (50 mg total) by mouth daily      Facility-Administered Medications: None     Patient has no known allergies.    Objective :  Temp:  [97 °F (36.1 °C)-99.4 °F (37.4 °C)] 97 °F (36.1 °C)  HR:  [79-91] 84  BP: (143-176)/(80-93) 147/81  Resp:  [16-21] 21  SpO2:  [96 %-99 %] 96 %  O2 Device: None (Room air)    Current Weight: Weight - Scale: 66 kg (145 lb 8.1 oz)  First Weight: Weight - Scale: 66 kg (145 lb 8.1 oz)  I/O         04/26 0701  04/27 0700 04/27 0701  04/28 0700 04/28 0701  04/29 0700    P.O.  480 240    IV Piggyback 1050      Total Intake(mL/kg) 1050 (15.9) 480 (7.3) 240 (3.6)    Urine (mL/kg/hr)  1000 (0.6)     Stool  0     Total Output  1000     Net +1050 -520 +240           Unmeasured Urine Occurrence 1 x      Unmeasured Stool Occurrence 1 x 1 x           Physical Exam  Vitals and nursing note reviewed.   Constitutional:       General: He is not in acute distress.     Appearance: He is well-developed.   HENT:      Head: Normocephalic and atraumatic.   Eyes:      Conjunctiva/sclera: Conjunctivae normal.   Cardiovascular:      Rate and Rhythm: Normal rate and regular rhythm.      Heart sounds: No murmur heard.  Pulmonary:      Effort: Pulmonary effort is normal. No respiratory distress.      Breath sounds: Normal breath sounds.   Abdominal:      Palpations: Abdomen is soft.      Tenderness: There is no abdominal tenderness.   Musculoskeletal:         General: No swelling.      Cervical back: Neck supple.   Skin:     General: Skin is warm and dry.      Capillary Refill: Capillary refill takes less than 2 seconds.   Neurological:      Mental Status: He is alert.   Psychiatric:         Mood and Affect: Mood normal.         Medications:    Current Facility-Administered Medications:     acetaminophen (TYLENOL) tablet 975 mg, 975 mg, Oral, Q8H PRN, Samy Bah PA-C     aluminum-magnesium hydroxide-simethicone (MAALOX) oral suspension 30 mL, 30 mL, Oral, Q6H PRN, Samy Bah PA-C    amLODIPine (NORVASC) tablet 5 mg, 5 mg, Oral, BID, Samy Bah PA-C, 5 mg at 04/28/25 0800    folic acid (FOLVITE) tablet 1 mg, 1 mg, Oral, Daily, Mojgan Young MD, 1 mg at 04/28/25 0800    heparin (porcine) subcutaneous injection 5,000 Units, 5,000 Units, Subcutaneous, Q8H JAISON, Samy Bah PA-C, 5,000 Units at 04/28/25 0543    hydrALAZINE (APRESOLINE) tablet 25 mg, 25 mg, Oral, Q8H JAISON, Samy Bah PA-C, 25 mg at 04/28/25 0542    HYDROmorphone HCl (DILAUDID) injection 0.2 mg, 0.2 mg, Intravenous, Q6H PRN, Samy Bah PA-C, 0.2 mg at 04/28/25 0752    insulin aspart protamine-insulin aspart (NovoLOG 70/30) 100 units/mL subcutaneous injection 4 Units, 4 Units, Subcutaneous, BID AC, Samy Bah PA-C, 4 Units at 04/28/25 0743    insulin lispro (HumALOG/ADMELOG) 100 units/mL subcutaneous injection 1-5 Units, 1-5 Units, Subcutaneous, 4x Daily (AC & HS), 2 Units at 04/28/25 0742 **AND** Fingerstick Glucose (POCT), , , 4x Daily AC and at bedtime, Samy Bah PA-C    naloxone (NARCAN) 0.04 mg/mL syringe 0.04 mg, 0.04 mg, Intravenous, Q1MIN PRN, Samy Bah PA-C    ondansetron (ZOFRAN) injection 4 mg, 4 mg, Intravenous, Q6H PRN, Samy Bah PA-C    oxyCODONE (ROXICODONE) IR tablet 5 mg, 5 mg, Oral, Q6H PRN, Samy Bah PA-C, 5 mg at 04/28/25 0542    oxyCODONE (ROXICODONE) split tablet 2.5 mg, 2.5 mg, Oral, Q6H PRN, Samy Bah PA-C, 2.5 mg at 04/27/25 1121    polyethylene glycol (MIRALAX) packet 17 g, 17 g, Oral, Daily PRN, Samy Bah PA-C    thiamine tablet 100 mg, 100 mg, Oral, Daily, Mojgan Young MD, 100 mg at 04/28/25 0800      Lab Results: I have reviewed the following results:  Results from last 7 days   Lab Units 04/28/25  0459 04/27/25  0456 04/26/25  1615   WBC Thousand/uL 10.50* 11.44* 10.20*   HEMOGLOBIN g/dL 9.8* 9.6* 11.0*   HEMATOCRIT % 28.1* 27.4* 30.9*   PLATELETS Thousands/uL  "243 242 235   POTASSIUM mmol/L 4.2 3.0* 3.7   CHLORIDE mmol/L 105 107 100   CO2 mmol/L 24 23 19*   BUN mg/dL 15 11 12   CREATININE mg/dL 1.92* 1.38* 1.52*   CALCIUM mg/dL 7.9* 7.8* 8.3*   ALBUMIN g/dL 2.4* 2.3* 2.8*       Administrative Statements     Portions of the record may have been created with voice recognition software. Occasional wrong word or \"sound a like\" substitutions may have occurred due to the inherent limitations of voice recognition software. Read the chart carefully and recognize, using context, where substitutions have occurred.If you have any questions, please contact the dictating provider.  "

## 2025-04-28 NOTE — ASSESSMENT & PLAN NOTE
Patient with a history of alcohol abuse  Presented with a transaminitis with AST: ALT ratio 1: 1  Total bilirubin 1.89  Normal INR  Not likely consistent with alcoholic hepatitis  Workup as above  Check acute hepatitis panel-pending

## 2025-04-28 NOTE — BRIEF OP NOTE (RAD/CATH)
IR Procedure not performed     PATIENT NAME: Wes Araujo  : 1970  MRN: [id number]    Pre-op Diagnosis:   1. Transaminitis    2. Elevated alkaline phosphatase level    3. Serum total bilirubin elevated    4. Abdominal pain    5. Hyperglycemia    6. Alcohol-induced chronic pancreatitis (HCC)    7. ZENAIDA (acute kidney injury) (HCC)      Post-op Diagnosis:   1. Transaminitis    2. Elevated alkaline phosphatase level    3. Serum total bilirubin elevated    4. Abdominal pain    5. Hyperglycemia    6. Alcohol-induced chronic pancreatitis (HCC)    7. ZENAIDA (acute kidney injury) (HCC)        Provider:  Marcia Dubois PA-C    No qualified resident was available, Resident is only observing    Abdomen examined with ultrasound. Small pocked to lateral left abdomen but unable to access. No ascites noted elsewhere. Paracentesis not performed.     Marcia Dubois PA-C     Date: 2025  Time: 2:53 PM

## 2025-04-28 NOTE — UTILIZATION REVIEW
Initial Clinical Review    Admission: Date/Time/Statement:   Admission Orders (From admission, onward)       Ordered        04/26/25 2124  INPATIENT ADMISSION  Once                          Orders Placed This Encounter   Procedures    INPATIENT ADMISSION     Standing Status:   Standing     Number of Occurrences:   1     Level of Care:   Med Surg [16]     Estimated length of stay:   More than 2 Midnights     Certification:   I certify that inpatient services are medically necessary for this patient for a duration of greater than two midnights. See H&P and MD Progress Notes for additional information about the patient's course of treatment.     ED Arrival Information       Expected   -    Arrival   4/26/2025 15:56    Acuity   Urgent              Means of arrival   Ambulance    Escorted by   [facility] ([facility])    Service   Hospitalist    Admission type   Emergency              Arrival complaint   abdominal pain             Chief Complaint   Patient presents with    Hyperglycemia - Symptomatic     Arrrives via EMS, reports blood sugar 500s, abd pain and back pain, vomiting/n/d for 10 days. Patient appears anxious. 15 lb weight gain in 1 month noted in triage, abd distended which is not baseline per patient. Last BM today. Reports daily drinking but last drink was 20 days ago. Reports not taking insulin for past 3 days due to not feeling well       Initial Presentation: 55 y.o. male presents to ED via  EMS from home with nausea/vomiting, abdominal pain  and high blood sugars.  Poor historian, [place], Polish speaking.  Symptoms present  for past week.  No inciting events.  Pain sharp, mainly in upper abdomen, no radiation.   Some improvement after pain meds in ED.   Has  been eating intermittently  but not taking insulin for past week.  Had episodes of loose stools,  but  normal  the day of admission.  Has not taken BP meds as he did not feel well.  PMH is  DM2, chronic pancreatitis, CKD,  seizure,  alcohol  abuse and HTN.   Appears anxious.  Has gained 15 lbs in past month, ABDOMEN  distended.  Drinks daily  but last drink  20 days ago.   Ct abdomen shows  enteritis, small volume ascites, chronic pancreatitis.   US  RUQ  shows ascites.   Labs reveal T bili  1.89, lactate  2.1  Admit  Ip with Transaminitis ,  Hyponatremia and Hypertensive urgency  and  plan is  monitor l abs,  monitor for alcohol withdrawal, GI consult, pain control and S?P  FARA  in ED and hold further  FARA  for now.       Anticipated Length of Stay/Certification Statement:  Patient will be admitted on an inpatient basis with an anticipated length of stay of greater than 2 midnights secondary to transaminitis.       Date:    4/27   Day 2:   GI consult  Needs complete alcohol cessation.   Plan IR  consult for paracentesis.  Trend  Lft's.  INR  1.0,  unlikely  alcoholic hepatitis.  Needs CIWA scores.      Current hemoglobin   9.6.   C/O  RUQ  abdominal  pain.   Previously  drank  7-8  beers daily.  20 days since last alcohol.  Continue current meds.    ED Treatment-Medication Administration from 04/26/2025 1556 to 04/26/2025 2232         Date/Time Order Dose Route Action     04/26/2025 1702 sodium chloride 0.9 % bolus 500 mL 500 mL Intravenous New Bag     04/26/2025 1656 amLODIPine (NORVASC) tablet 5 mg 5 mg Oral Given     04/26/2025 1700 ondansetron (ZOFRAN) injection 4 mg 4 mg Intravenous Given     04/26/2025 1656 hydrALAZINE (APRESOLINE) tablet 25 mg 25 mg Oral Given     04/26/2025 1818 iohexol (OMNIPAQUE) 350 MG/ML injection (MULTI-DOSE) 100 mL 100 mL Intravenous Given     04/26/2025 1843 ceftriaxone (ROCEPHIN) 2 g/50 mL in dextrose IVPB 2,000 mg Intravenous New Bag     04/26/2025 1839 acetaminophen (TYLENOL) tablet 650 mg 650 mg Oral Given     04/26/2025 1840 HYDROmorphone (DILAUDID) injection 0.5 mg 0.5 mg Intravenous Given     04/26/2025 1844 sodium chloride 0.9 % bolus 500 mL 500 mL Intravenous New Bag            Scheduled  Medications:  amLODIPine, 5 mg, Oral, BID  folic acid, 1 mg, Oral, Daily  heparin (porcine), 5,000 Units, Subcutaneous, Q8H JAISON  hydrALAZINE, 25 mg, Oral, Q8H JAISON  insulin aspart protamine-insulin aspart, 4 Units, Subcutaneous, BID AC  insulin lispro, 1-5 Units, Subcutaneous, 4x Daily (AC & HS)  thiamine, 100 mg, Oral, Daily      Continuous IV Infusions:     PRN Meds:  acetaminophen, 975 mg, Oral, Q8H PRN  aluminum-magnesium hydroxide-simethicone, 30 mL, Oral, Q6H PRN  HYDROmorphone, 0.2 mg, Intravenous, Q6H PRN  ( x1  4/28)    naloxone, 0.04 mg, Intravenous, Q1MIN PRN  ondansetron, 4 mg, Intravenous, Q6H PRN  oxyCODONE, 5 mg, Oral, Q6H PRN  ( x1  4/27)   oxyCODONE, 2.5 mg, Oral, Q6H PRN  ( x1  4/27)   polyethylene glycol, 17 g, Oral, Daily PRN      ED Triage Vitals   Temperature Pulse Respirations Blood Pressure SpO2 Pain Score   04/26/25 1601 04/26/25 1601 04/26/25 1601 04/26/25 1601 04/26/25 1601 04/26/25 1839   98.9 °F (37.2 °C) 97 18 (!) 218/104 100 % 8     Weight (last 2 days)       Date/Time Weight    04/26/25 1601 66 (145.5)            Vital Signs (last 3 days)       Date/Time Temp Pulse Resp BP MAP (mmHg) SpO2 O2 Device Patient Position - Orthostatic VS CIWA-Ar Total Pain    04/28/25 0814 97 °F (36.1 °C) 91 20 143/88 95 97 % None (Room air) Lying -- --    04/28/25 0752 -- -- -- -- -- -- -- -- -- 6 04/28/25 0700 -- 90 -- 152/80 -- -- -- -- 0 --    04/28/25 0542 97.4 °F (36.3 °C) 79 18 175/92 119 -- -- Lying -- 6 04/28/25 0300 97.9 °F (36.6 °C) 88 18 164/89 111 97 % None (Room air) Lying 0 --    04/27/25 2300 -- -- -- -- -- -- -- -- -- No Pain    04/27/25 2217 98 °F (36.7 °C) 86 16 158/91 -- 98 % None (Room air) Lying 0 --    04/27/25 2000 98 °F (36.7 °C) 88 18 161/81 114 -- -- Lying 0 --    04/27/25 1756 -- -- -- -- -- -- -- -- -- 7    04/27/25 1511 99.4 °F (37.4 °C) 82 17 174/93 123 99 % None (Room air) Lying 0 --    04/27/25 1441 99.4 °F (37.4 °C) 84 18 176/86 123 98 % None (Room air) Lying -- --     04/27/25 1121 -- -- -- -- -- -- -- -- -- 7    04/27/25 0900 -- -- -- -- -- -- -- -- -- 8    04/27/25 0820 -- 100 -- 142/72 -- 97 % None (Room air) -- 3 --    04/27/25 0810 -- -- -- -- -- 99 % None (Room air) -- -- --    04/27/25 0735 97.8 °F (36.6 °C) 84 18 159/86 103 98 % None (Room air) Lying -- --    04/27/25 0516 -- -- -- 173/86 -- -- -- -- -- --    04/27/25 0130 -- 79 -- 124/67 90 -- -- Lying -- --    04/26/25 2238 -- -- -- 182/90 -- -- -- Lying -- --    04/26/25 2230 -- -- -- -- -- -- -- -- -- 9    04/26/25 2139 -- 84 18 181/90 128 98 % None (Room air) Lying -- --    04/26/25 1845 -- 90 22 195/100 138 100 % -- Lying -- --    04/26/25 1839 -- 84 16 195/100 138 100 % None (Room air) Sitting -- 8    04/26/25 1738 -- 84 17 -- -- 100 % -- -- -- --    04/26/25 1656 -- -- -- 231/109 -- -- -- -- -- --    04/26/25 1610 -- -- -- -- -- -- None (Room air) -- -- --    04/26/25 1601 98.9 °F (37.2 °C) 97 18 218/104 149 100 % None (Room air) Sitting -- --           CIWA-Ar Score       Row Name 04/28/25 0700 04/28/25 0300 04/27/25 2217       CIWA-Ar    /80 -- --    Nausea and Vomiting 0 0 0    Tactile Disturbances 0 0 0    Tremor 0 0 0    Auditory Disturbances 0 0 0    Paroxysmal Sweats 0 0 0    Visual Disturbances 0 0 0    Anxiety 0 0 0    Headache, Fullness in Head 0 0 0    Agitation 0 0 0    Orientation and Clouding of Sensorium 0 0 0    CIWA-Ar Total 0 0 0      Row Name 04/27/25 2000 04/27/25 1511 04/27/25 0820       CIWA-Ar    Nausea and Vomiting 0 0 0    Tactile Disturbances 0 0 0    Tremor 0 0 0    Auditory Disturbances 0 0 0    Paroxysmal Sweats 0 0 0    Visual Disturbances 0 0 0    Anxiety 0 0 0    Headache, Fullness in Head 0 0 3    Agitation 0 0 0    Orientation and Clouding of Sensorium 0 0 0    CIWA-Ar Total 0 0 3                    Pertinent Labs/Diagnostic Test Results:   Radiology:  US right upper quadrant   Final Interpretation by Elida Lomas MD (04/26 2032)      Gallbladder wall thickening in  the setting of ascites. Findings most likely reflects the presence of ascites, underlying liver disease, heart failure or hypoproteinemia. Acute cholecystitis is considered less likely.   Moderate ascites.      Workstation performed: [id number]         CT abdomen pelvis with contrast   ED Interpretation by Flores Monk MD (04/26 1824)   Concerning for acute cholecystitis with additional fluid surrounding the liver       Final Interpretation by Jad Syed MD (04/26 1908)      Enteritis.      Mild mesenteric edema and small volume ascites. Small left pleural effusion.      Stable findings of chronic pancreatitis.         Workstation performed: [id number]         XR chest 2 views   ED Interpretation by Flores Monk MD (04/26 1734)   Concerning for lower lobe pneumonia on lateral       Final Interpretation by Carolina Osman MD (04/27 0748)      Low lung volumes with bibasilar atelectasis. No pneumonia on subsequent abdomen CT.      Small pleural effusions.            Workstation performed: [id number]         IR INPATIENT Paracentesis    (Results Pending)     Cardiology:          Results from last 7 days   Lab Units 04/28/25 0459 04/27/25  0456 04/26/25  1615   WBC Thousand/uL 10.50* 11.44* 10.20*   HEMOGLOBIN g/dL 9.8* 9.6* 11.0*   HEMATOCRIT % 28.1* 27.4* 30.9*   PLATELETS Thousands/uL 243 242 235   TOTAL NEUT ABS Thousands/µL 6.47  --  6.43         Results from last 7 days   Lab Units 04/28/25  0459 04/27/25  0456 04/26/25  1615   SODIUM mmol/L 133* 135 127*   POTASSIUM mmol/L 4.2 3.0* 3.7   CHLORIDE mmol/L 105 107 100   CO2 mmol/L 24 23 19*   ANION GAP mmol/L 4 5 8   BUN mg/dL 15 11 12   CREATININE mg/dL 1.92* 1.38* 1.52*   EGFR ml/min/1.73sq m 38 57 50   CALCIUM mg/dL 7.9* 7.8* 8.3*     Results from last 7 days   Lab Units 04/28/25  0459 04/27/25  0456 04/26/25  1615   AST U/L 33 35 81*   ALT U/L 38 42 60*   ALK PHOS U/L 541* 527* 639*   TOTAL PROTEIN g/dL 5.1* 4.9* 6.0*   ALBUMIN g/dL 2.4* 2.3* 2.8*    TOTAL BILIRUBIN mg/dL 1.64* 0.86 1.89*     Results from last 7 days   Lab Units 04/28/25  0620 04/27/25  2055 04/27/25  1528 04/27/25  1123 04/27/25  0754 04/26/25  2327 04/26/25  1832 04/26/25  1600   POC GLUCOSE mg/dl 237* 142* 183* 200* 154* 344* 388* 443*     Results from last 7 days   Lab Units 04/28/25  0459 04/27/25  0456 04/26/25  1615   GLUCOSE RANDOM mg/dL 251* 149* 489*             Beta- Hydroxybutyrate   Date Value Ref Range Status   04/26/2025 <0.05 0.02 - 0.27 mmol/L Final   02/05/2025 <0.05 0.02 - 0.27 mmol/L Final   10/22/2024 0.08 0.02 - 0.27 mmol/L Final          Results from last 7 days   Lab Units 04/26/25  1615   PH [place]  7.539*   PCO2 [place] mm Hg 22.3*   PO2 [place] mm Hg 49.2*   HCO3 [place] mmol/L 18.6*   BASE EXC [place] mmol/L -2.3   O2 CONTENT [place] ml/dL 14.5   O2 HGB, VENOUS % 88.0*             Results from last 7 days   Lab Units 04/26/25  1830 04/26/25  1615   HS TNI 0HR ng/L  --  11   HS TNI 2HR ng/L 11  --    HSTNI D2 ng/L 0  --          Results from last 7 days   Lab Units 04/28/25  0459 04/27/25  0829   PROTIME seconds 13.0 13.4   INR  0.96 1.00             Results from last 7 days   Lab Units 04/26/25  1830 04/26/25  1615   LACTIC ACID mmol/L 1.2 2.1*             Results from last 7 days   Lab Units 04/26/25  1615   BNP pg/mL 137*                     Results from last 7 days   Lab Units 04/26/25  1615   LIPASE u/L 14               Results from last 7 days   Lab Units 04/26/25  1736   ETHANOL LVL mg/dL <10   ACETAMINOPHEN LVL ug/mL <2*   SALICYLATE LVL mg/dL <5               Present on Admission:   (Resolved) Hypertensive urgency   Alcohol-induced chronic pancreatitis (HCC)   CKD stage 3a, GFR 45-59 ml/min (HCC)   Alcohol abuse   Hyponatremia   Transaminitis   Paroxysmal A-fib (HCC)   Seizure (HCC)   Primary hypertension   Anemia      Admitting Diagnosis: Abdominal pain [R10.9]  Transaminitis [R74.01]  Hyperglycemia [R73.9]  Serum total bilirubin elevated [R17]  Elevated alkaline phosphatase  level [R74.8]  Age/Sex: 55 y.o. male    Network Utilization Review Department  ATTENTION: Please call with any questions or concerns to [phone] and carefully listen to the prompts so that you are directed to the right person. All voicemails are confidential.   For Discharge needs, contact Care Management DC Support Team at [phone] opt. 2  Send all requests for admission clinical reviews, approved or denied determinations and any other requests to dedicated fax number below belonging to the campus where the patient is receiving treatment. List of dedicated fax numbers for the Facilities:  FACILITY NAME UR FAX NUMBER   ADMISSION DENIALS (Administrative/Medical Necessity) [phone]   DISCHARGE SUPPORT TEAM (NETWORK) [phone]   PARENT CHILD HEALTH (Maternity/NICU/Pediatrics) [phone]   [facility] [phone]   [facility] [phone]   [facility] [phone]   [facility] [phone]   [facility] [phone]   [facility] [phone]   [facility] [phone]   [facility] [phone]   [facility] [phone]   [facility] [phone]   [facility] [phone]   [facility] [phone]

## 2025-04-28 NOTE — ASSESSMENT & PLAN NOTE
Lab Results   Component Value Date    EGFR 38 04/28/2025    EGFR 57 04/27/2025    EGFR 50 04/26/2025    CREATININE 1.92 (H) 04/28/2025    CREATININE 1.38 (H) 04/27/2025    CREATININE 1.52 (H) 04/26/2025   Avoiding nephrotoxic agents and monitor urine output, check a urinary retention protocol.

## 2025-04-28 NOTE — NURSING NOTE
Patient is not happy with the IV access location.  He wants it to be changed.  It was placed by ED RN this evening.  IV fluid is infusing.  Clinical co-ordinator explained in detail the situation of poor IV access and the need to wait for another 2 to 3 hours before a specialized RN can try for another IV location.  Patient is agreeable to wait. But he wants another IV cannula.  Handed over to next shift.

## 2025-04-28 NOTE — ASSESSMENT & PLAN NOTE
His creatinine was around 1.2-1.3 now up to 1.9.  Not on any diuretics, blood pressure stable there may be some mild autoregulatory failure with blood pressure improvement from the 190s down to the 140 systolic range.  Would also worry about prerenal azotemia.  Will place on some maintenance IV fluids for now not significantly azotemic however likely has poor muscle mass.  Electrolytes are stable..  Will check a urine sodium

## 2025-04-28 NOTE — ASSESSMENT & PLAN NOTE
Patient continues to have abdominal pain.  His LFTs are elevated as well as his bilirubin and he has pancreatitis.  He is getting a diagnostic and therapeutic paracentesis to rule out SBP.

## 2025-04-28 NOTE — ASSESSMENT & PLAN NOTE
Patient's blood pressure on presentation was 190/60.Patient's home medications were reviewed, not on any ACE or ARB

## 2025-04-28 NOTE — PLAN OF CARE
Problem: Potential for Falls  Goal: Patient will remain free of falls  Description: INTERVENTIONS:- Educate patient/family on patient safety including physical limitations- Instruct patient to call for assistance with activity - Consult OT/PT to assist with strengthening/mobility - Keep Call bell within reach- Keep bed low and locked with side rails adjusted as appropriate- Keep care items and personal belongings within reach- Initiate and maintain comfort rounds- Make Fall Risk Sign visible to staff- Offer Toileting every 2 Hours, in advance of need- Initiate/Maintain bed alarm- Obtain necessary fall risk management equipment: - Apply yellow socks and bracelet for high fall risk patients- Consider moving patient to room near nurses station  INTERVENTIONS:- Educate patient/family on patient safety including physical limitations- Instruct patient to call for assistance with activity - Consult OT/PT to assist with strengthening/mobility - Keep Call bell within reach- Keep bed low and locked with side rails adjusted as appropriate- Keep care items and personal belongings within reach- Initiate and maintain comfort rounds-     Problem: PAIN - ADULT  Goal: Verbalizes/displays adequate comfort level or baseline comfort level  Description: Interventions:- Encourage patient to monitor pain and request assistance- Assess pain using appropriate pain scale- Administer analgesics based on type and severity of pain and evaluate response- Implement non-pharmacological measures as appropriate and evaluate response- Consider cultural and social influences on pain and pain management- Notify physician/advanced practitioner if interventions unsuccessful or patient reports new pain  Outcome: Progressing     Problem: SAFETY ADULT  Goal: Patient will remain free of falls  Description: INTERVENTIONS:- Educate patient/family on patient safety including physical limitations- Instruct patient to call for assistance with activity - Consult  OT/PT to assist with strengthening/mobility - Keep Call bell within reach- Keep bed low and locked with side rails adjusted as appropriate- Keep care items and personal belongings within reach- Initiate and maintain comfort rounds  INTERVENTIONS:- Educate patient/family on patient safety including physical limitations- Instruct patient to call for assistance with activity - Consult OT/PT to assist with strengthening/mobility - Keep Call bell within reach- Keep bed low and locked with side rails adjusted as appropriate- Keep care items and personal belongings within reach- Initiate and maintain comfort rounds-  Outcome: Progressing     Problem: DISCHARGE PLANNING  Goal: Discharge to home or other facility with appropriate resources  Description: INTERVENTIONS:- Identify barriers to discharge w/patient and caregiver- Arrange for needed discharge resources and transportation as appropriate- Identify discharge learning needs (meds, wound care, etc.)- Arrange for interpretive services to assist at discharge as needed- Refer to Case Management Department for coordinating discharge planning if the patient needs post-hospital services based on physician/advanced practitioner order or complex needs related to functional status, cognitive ability, or social support system  Outcome: Progressing     Problem: Knowledge Deficit  Goal: Patient/family/caregiver demonstrates understanding of disease process, treatment plan, medications, and discharge instructions  Description: Complete learning assessment and assess knowledge base.Interventions:- Provide teaching at level of understanding- Provide teaching via preferred learning methods  Outcome: Progressing     Problem: CARDIOVASCULAR - ADULT  Goal: Maintains optimal cardiac output and hemodynamic stability  Description: INTERVENTIONS:- Monitor I/O, vital signs and rhythm- Monitor for S/S and trends of decreased cardiac output- Administer and titrate ordered vasoactive medications  to optimize hemodynamic stability- Assess quality of pulses, skin color and temperature- Assess for signs of decreased coronary artery perfusion- Instruct patient to report change in severity of symptoms  Outcome: Progressing  Goal: Absence of cardiac dysrhythmias or at baseline rhythm  Description: INTERVENTIONS:- Continuous cardiac monitoring, vital signs, obtain 12 lead EKG if ordered- Administer antiarrhythmic and heart rate control medications as ordered- Monitor electrolytes and administer replacement therapy as ordered  Outcome: Progressing

## 2025-04-28 NOTE — NURSING NOTE
Difficult IV access.  The EMS IV access is 2 days old and painful when flushed.  Co-ordinating with vascular RN to find a IV access for him.

## 2025-04-28 NOTE — ASSESSMENT & PLAN NOTE
Pseudohyponatremia in setting of hyperglycemia, plus history of chronic hyponatremia secondary to excessive free water due to alcohol  Improving

## 2025-04-28 NOTE — ASSESSMENT & PLAN NOTE
Lab Results   Component Value Date    EGFR 38 04/28/2025    EGFR 57 04/27/2025    EGFR 50 04/26/2025    CREATININE 1.92 (H) 04/28/2025    CREATININE 1.38 (H) 04/27/2025    CREATININE 1.52 (H) 04/26/2025     CKD in setting of hypertensive, diabetic nephropathy   Baseline Cr: 1.3-1.5  Admit Cr: 1.52 which is now increased to 1.92.  Nephrology consulted will appreciate recs

## 2025-04-28 NOTE — PLAN OF CARE
Problem: PAIN - ADULT  Goal: Verbalizes/displays adequate comfort level or baseline comfort level  Description: Interventions:- Encourage patient to monitor pain and request assistance- Assess pain using appropriate pain scale- Administer analgesics based on type and severity of pain and evaluate response- Implement non-pharmacological measures as appropriate and evaluate response- Consider cultural and social influences on pain and pain management- Notify physician/advanced practitioner if interventions unsuccessful or patient reports new pain  Outcome: Progressing     Problem: SAFETY ADULT  Goal: Patient will remain free of falls  Description: INTERVENTIONS:- Educate patient/family on patient safety including physical limitations- Instruct patient to call for assistance with activity - Consult OT/PT to assist with strengthening/mobility - Keep Call bell within reach- Keep bed low and locked with side rails adjusted as appropriate- Keep care items and personal belongings within reach- Initiate and maintain comfort rounds- Make Fall Risk Sign visible to staff- Offer Toileting every 4 Hours, in advance of need- Initiate/Maintain bed alarm- Obtain necessary fall risk management equipment: walker- Apply yellow socks and bracelet for high fall risk patients- Consider moving patient to room near nurses station  INTERVENTIONS:- Educate patient/family on patient safety including physical limitations- Instruct patient to call for assistance with activity - Consult OT/PT to assist with strengthening/mobility - Keep Call bell within reach- Keep bed low and locked with side rails adjusted as appropriate- Keep care items and personal belongings within reach- Initiate and maintain comfort rounds- Make Fall Risk Sign visible to staff- Offer Toileting every   Problem: CARDIOVASCULAR - ADULT  Goal: Maintains optimal cardiac output and hemodynamic stability  Description: INTERVENTIONS:- Monitor I/O, vital signs and rhythm- Monitor  for S/S and trends of decreased cardiac output- Administer and titrate ordered vasoactive medications to optimize hemodynamic stability- Assess quality of pulses, skin color and temperature- Assess for signs of decreased coronary artery perfusion- Instruct patient to report change in severity of symptoms  Outcome: Progressing  Goal: Absence of cardiac dysrhythmias or at baseline rhythm  Description: INTERVENTIONS:- Continuous cardiac monitoring, vital signs, obtain 12 lead EKG if ordered- Administer antiarrhythmic and heart rate control medications as ordered- Monitor electrolytes and administer replacement therapy as ordered  Outcome: Progressing     Problem: Knowledge Deficit  Goal: Patient/family/caregiver demonstrates understanding of disease process, treatment plan, medications, and discharge instructions  Description: Complete learning assessment and assess knowledge base.Interventions:- Provide teaching at level of understanding- Provide teaching via preferred learning methods  Outcome: Progressing    Hours, in advance of need- Initiate/Maintain call alarm- Obtain necessary fall risk management equipment: walker- Apply yellow socks and bracelet for high fall risk patients- Consider moving patient to room near nurses station  Outcome: Progressing

## 2025-04-28 NOTE — DISCHARGE INSTRUCTIONS
Abdominal Paracentesis     WHAT YOU NEED TO KNOW:   Abdominal paracentesis is a procedure to remove abnormal fluid buildup in your abdomen. Fluid builds up because of liver problems, such as swelling and scarring. Heart failure, kidney disease, a mass, or problems with your pancreas may also cause fluid buildup.     DISCHARGE INSTRUCTIONS:     Follow up with your healthcare provider as directed: Write down your questions so you remember to ask them during your visits.     Wound care: Remove dressing after 24 hours. Leave glue in place.    Return to your normal activities    Contact Interventional Radiology at [phone] ([place] PATIENTS: Contact Interventional Radiology at [phone]) ([place] PATIENTS: Contact Interventional Radiology at [phone]) if:  You have a fever and your wound is red and swollen.   You have yellow, green, or bad-smelling discharge coming from your wound.   You have pain or swelling in your abdomen.   You have an upset stomach or you vomit.   You have sudden, sharp pain in your abdomen.   You urinate very little or not at all.   You feel confused and more tired than usual.   Your arm or leg feels warm, tender, and painful. It may look swollen and red.   You suddenly feel lightheaded and have trouble breathing. Abdominal Paracentesis     WHAT YOU NEED TO KNOW:   Abdominal paracentesis is a procedure to remove abnormal fluid buildup in your abdomen. Fluid builds up because of liver problems, such as swelling and scarring. Heart failure, kidney disease, a mass, or problems with your pancreas may also cause fluid buildup.     DISCHARGE INSTRUCTIONS:     Follow up with your healthcare provider as directed: Write down your questions so you remember to ask them during your visits.     Wound care: Remove dressing after 24 hours. Leave glue in place.    Return to your normal activities    Contact Interventional Radiology at [phone] ([place] PATIENTS: Contact Interventional Radiology at  [phone]) ([place] PATIENTS: Contact Interventional Radiology at [phone]) if:  You have a fever and your wound is red and swollen.   You have yellow, green, or bad-smelling discharge coming from your wound.   You have pain or swelling in your abdomen.   You have an upset stomach or you vomit.   You have sudden, sharp pain in your abdomen.   You urinate very little or not at all.   You feel confused and more tired than usual.   Your arm or leg feels warm, tender, and painful. It may look swollen and red.   You suddenly feel lightheaded and have trouble breathing.

## 2025-04-28 NOTE — ASSESSMENT & PLAN NOTE
Patient is a 55-year-old male with past medical history significant for alcohol abuse, hypertension, diabetes, and chronic kidney disease who presented to the ER with abdominal pain, nausea and vomiting     CT abd/pelvis reviewed showing small bowel thickening suggesting enteritis, mild mesenteric edema and small volume ascites and stable findings of chronic pancreatitis  RUQ U/S: showing gall bladder wall thickening 6-7mm most likely in the setting of ascites with acute cholecystitis less likely, CBD 4mm, negative sanches's  Patient had transaminitis with AST 81: ALT 60, alkaline phosphatase 639 and total bilirubin 1.89  No diarrhea, with question of enteritis on CT  Patient was evaluated by the GI team: Noted to have moderate-large ascites  IR consulted for diagnostic and therapeutic paracentesis  If evaluation unremarkable they recommend HIDA scan

## 2025-04-29 PROBLEM — R74.01 TRANSAMINITIS: Status: RESOLVED | Noted: 2024-03-11 | Resolved: 2025-04-29

## 2025-04-29 LAB
ALBUMIN SERPL BCG-MCNC: 2.3 G/DL (ref 3.5–5)
ALP SERPL-CCNC: 467 U/L (ref 34–104)
ALT SERPL W P-5'-P-CCNC: 29 U/L (ref 7–52)
AMMONIA PLAS-SCNC: 34 UMOL/L (ref 18–72)
ANION GAP SERPL CALCULATED.3IONS-SCNC: 5 MMOL/L (ref 4–13)
AST SERPL W P-5'-P-CCNC: 30 U/L (ref 13–39)
BILIRUB SERPL-MCNC: 2.11 MG/DL (ref 0.2–1)
BUN SERPL-MCNC: 15 MG/DL (ref 5–25)
CALCIUM ALBUM COR SERPL-MCNC: 9.2 MG/DL (ref 8.3–10.1)
CALCIUM SERPL-MCNC: 7.8 MG/DL (ref 8.4–10.2)
CHLORIDE SERPL-SCNC: 106 MMOL/L (ref 96–108)
CO2 SERPL-SCNC: 24 MMOL/L (ref 21–32)
CREAT SERPL-MCNC: 2.14 MG/DL (ref 0.6–1.3)
ERYTHROCYTE [DISTWIDTH] IN BLOOD BY AUTOMATED COUNT: 11.9 % (ref 11.6–15.1)
GFR SERPL CREATININE-BSD FRML MDRD: 33 ML/MIN/1.73SQ M
GLUCOSE SERPL-MCNC: 118 MG/DL (ref 65–140)
GLUCOSE SERPL-MCNC: 136 MG/DL (ref 65–140)
GLUCOSE SERPL-MCNC: 161 MG/DL (ref 65–140)
GLUCOSE SERPL-MCNC: 161 MG/DL (ref 65–140)
GLUCOSE SERPL-MCNC: 86 MG/DL (ref 65–140)
HCT VFR BLD AUTO: 24.3 % (ref 36.5–49.3)
HGB BLD-MCNC: 8.6 G/DL (ref 12–17)
INR PPP: 0.94 (ref 0.85–1.19)
MAGNESIUM SERPL-MCNC: 1.7 MG/DL (ref 1.9–2.7)
MCH RBC QN AUTO: 30.8 PG (ref 26.8–34.3)
MCHC RBC AUTO-ENTMCNC: 35.4 G/DL (ref 31.4–37.4)
MCV RBC AUTO: 87 FL (ref 82–98)
PLATELET # BLD AUTO: 238 THOUSANDS/UL (ref 149–390)
PMV BLD AUTO: 10.9 FL (ref 8.9–12.7)
POTASSIUM SERPL-SCNC: 4.3 MMOL/L (ref 3.5–5.3)
PROT SERPL-MCNC: 4.6 G/DL (ref 6.4–8.4)
PROTHROMBIN TIME: 12.8 SECONDS (ref 12.3–15)
RBC # BLD AUTO: 2.79 MILLION/UL (ref 3.88–5.62)
SODIUM SERPL-SCNC: 135 MMOL/L (ref 135–147)
SODIUM UR-SCNC: <10 MMOL/L
WBC # BLD AUTO: 10.32 THOUSAND/UL (ref 4.31–10.16)

## 2025-04-29 PROCEDURE — 83735 ASSAY OF MAGNESIUM: CPT

## 2025-04-29 PROCEDURE — 81001 URINALYSIS AUTO W/SCOPE: CPT

## 2025-04-29 PROCEDURE — 82140 ASSAY OF AMMONIA: CPT | Performed by: INTERNAL MEDICINE

## 2025-04-29 PROCEDURE — 99233 SBSQ HOSP IP/OBS HIGH 50: CPT | Performed by: INTERNAL MEDICINE

## 2025-04-29 PROCEDURE — 80053 COMPREHEN METABOLIC PANEL: CPT

## 2025-04-29 PROCEDURE — 82948 REAGENT STRIP/BLOOD GLUCOSE: CPT

## 2025-04-29 PROCEDURE — 99232 SBSQ HOSP IP/OBS MODERATE 35: CPT

## 2025-04-29 PROCEDURE — 85027 COMPLETE CBC AUTOMATED: CPT

## 2025-04-29 PROCEDURE — 84300 ASSAY OF URINE SODIUM: CPT | Performed by: INTERNAL MEDICINE

## 2025-04-29 PROCEDURE — 85610 PROTHROMBIN TIME: CPT

## 2025-04-29 PROCEDURE — 99232 SBSQ HOSP IP/OBS MODERATE 35: CPT | Performed by: INTERNAL MEDICINE

## 2025-04-29 RX ORDER — OCTREOTIDE ACETATE 100 UG/ML
100 INJECTION, SOLUTION INTRAVENOUS; SUBCUTANEOUS EVERY 8 HOURS SCHEDULED
Status: DISCONTINUED | OUTPATIENT
Start: 2025-04-29 | End: 2025-04-30 | Stop reason: HOSPADM

## 2025-04-29 RX ORDER — MAGNESIUM SULFATE HEPTAHYDRATE 40 MG/ML
2 INJECTION, SOLUTION INTRAVENOUS ONCE
Status: COMPLETED | OUTPATIENT
Start: 2025-04-29 | End: 2025-04-29

## 2025-04-29 RX ORDER — ALBUMIN (HUMAN) 12.5 G/50ML
25 SOLUTION INTRAVENOUS EVERY 8 HOURS
Status: DISCONTINUED | OUTPATIENT
Start: 2025-04-29 | End: 2025-04-30 | Stop reason: HOSPADM

## 2025-04-29 RX ORDER — POLYETHYLENE GLYCOL 3350 17 G/17G
17 POWDER, FOR SOLUTION ORAL DAILY
Status: DISCONTINUED | OUTPATIENT
Start: 2025-04-29 | End: 2025-04-30 | Stop reason: HOSPADM

## 2025-04-29 RX ADMIN — HEPARIN SODIUM 5000 UNITS: 5000 INJECTION INTRAVENOUS; SUBCUTANEOUS at 22:05

## 2025-04-29 RX ADMIN — HEPARIN SODIUM 5000 UNITS: 5000 INJECTION INTRAVENOUS; SUBCUTANEOUS at 14:34

## 2025-04-29 RX ADMIN — Medication 100 MG: at 08:14

## 2025-04-29 RX ADMIN — INSULIN ASPART 4 UNITS: 100 INJECTION, SUSPENSION SUBCUTANEOUS at 08:13

## 2025-04-29 RX ADMIN — AMLODIPINE BESYLATE 5 MG: 5 TABLET ORAL at 08:14

## 2025-04-29 RX ADMIN — AMLODIPINE BESYLATE 5 MG: 5 TABLET ORAL at 17:23

## 2025-04-29 RX ADMIN — HYDRALAZINE HYDROCHLORIDE 25 MG: 25 TABLET ORAL at 05:59

## 2025-04-29 RX ADMIN — POLYETHYLENE GLYCOL 3350 17 G: 17 POWDER, FOR SOLUTION ORAL at 10:30

## 2025-04-29 RX ADMIN — INSULIN LISPRO 1 UNITS: 100 INJECTION, SOLUTION INTRAVENOUS; SUBCUTANEOUS at 08:14

## 2025-04-29 RX ADMIN — HEPARIN SODIUM 5000 UNITS: 5000 INJECTION INTRAVENOUS; SUBCUTANEOUS at 05:59

## 2025-04-29 RX ADMIN — ALBUMIN (HUMAN) 25 G: 0.25 INJECTION, SOLUTION INTRAVENOUS at 17:23

## 2025-04-29 RX ADMIN — HYDRALAZINE HYDROCHLORIDE 25 MG: 25 TABLET ORAL at 22:05

## 2025-04-29 RX ADMIN — FOLIC ACID 1 MG: 1 TABLET ORAL at 08:15

## 2025-04-29 RX ADMIN — HYDRALAZINE HYDROCHLORIDE 25 MG: 25 TABLET ORAL at 14:34

## 2025-04-29 RX ADMIN — MAGNESIUM SULFATE HEPTAHYDRATE 2 G: 40 INJECTION, SOLUTION INTRAVENOUS at 08:15

## 2025-04-29 RX ADMIN — ACETAMINOPHEN 975 MG: 325 TABLET, FILM COATED ORAL at 08:32

## 2025-04-29 RX ADMIN — OCTREOTIDE ACETATE 100 MCG: 100 INJECTION, SOLUTION INTRAVENOUS; SUBCUTANEOUS at 23:10

## 2025-04-29 RX ADMIN — OCTREOTIDE ACETATE 100 MCG: 100 INJECTION, SOLUTION INTRAVENOUS; SUBCUTANEOUS at 14:34

## 2025-04-29 RX ADMIN — INSULIN LISPRO 1 UNITS: 100 INJECTION, SOLUTION INTRAVENOUS; SUBCUTANEOUS at 22:06

## 2025-04-29 RX ADMIN — ALBUMIN (HUMAN) 25 G: 0.25 INJECTION, SOLUTION INTRAVENOUS at 10:29

## 2025-04-29 NOTE — ASSESSMENT & PLAN NOTE
GI following, unclear etiology, notes reviewed thought to be related to a viral gastroenteritis versus a postinfectious IBS versus an acalculous cholecystitis.  Advancing diet as tolerated starting bowel regimen  No extrahepatic manifestations of cirrhosis, LFTs improved, alk phos is elevated, CT scan shows unremarkable liver function, can check a ammonia level, but no and significant thrombocytopenia with some mild anemia..  Bilirubin is rising

## 2025-04-29 NOTE — ASSESSMENT & PLAN NOTE
Patient's blood pressure on presentation was 190/60.Patient's home medications were reviewed, not on any ACE or ARB.  Avoid overaggressive blood pressure management

## 2025-04-29 NOTE — CERTIFIED RECOVERY SPECIALIST
Certified  Note    Patient name: Wes Araujo  Location: [facility]  /E2 -*  [place]: [facility]  Attending:  Jazmín Buchanan MD MRN [id number]  : 1970  Age: 55 y.o.    Sex: male Date 2025         Substance Use History:     Social History     Substance and Sexual Activity   Alcohol Use Yes        Social History     Substance and Sexual Activity   Drug Use Not Currently    Types: Cocaine     CRS attempted to engage - patient currently involved with staff. CRS will return     Contact and resources provided           Gwen Limon

## 2025-04-29 NOTE — ASSESSMENT & PLAN NOTE
Patient is a 55-year-old male with past medical history significant for alcohol abuse, hypertension, diabetes, and chronic kidney disease who presented to the ER with abdominal pain, nausea and vomiting     Currently pain is at 3/10 intensity as per patient much improved as compared to before  CT abd/pelvis reviewed showing small bowel thickening suggesting enteritis, mild mesenteric edema and small volume ascites and stable findings of chronic pancreatitis  RUQ U/S: showing gall bladder wall thickening 6-7mm most likely in the setting of ascites with acute cholecystitis less likely, CBD 4mm, negative sanches's  Patient had transaminitis with AST 81: ALT 60 which has not resolved, alkaline phosphatase 639 and total bilirubin 1.89  No diarrhea, with question of enteritis on CT  Patient was evaluated by the GI team: Noted to have moderate-large ascites  IR was consulted for diagnostic and therapeutic paracentesis but given small pockets procedure could not be done.   GI on board agree with plans of albumin trial given worsening kidney failure   If evaluation unremarkable they recommend HIDA scan

## 2025-04-29 NOTE — ASSESSMENT & PLAN NOTE
Lab Results   Component Value Date    EGFR 33 04/29/2025    EGFR 38 04/28/2025    EGFR 57 04/27/2025    CREATININE 2.14 (H) 04/29/2025    CREATININE 1.92 (H) 04/28/2025    CREATININE 1.38 (H) 04/27/2025   Avoiding nephrotoxic agents and monitor urine output, check a urinary retention protocol.

## 2025-04-29 NOTE — ASSESSMENT & PLAN NOTE
Lab Results   Component Value Date    EGFR 33 04/29/2025    EGFR 38 04/28/2025    EGFR 57 04/27/2025    CREATININE 2.14 (H) 04/29/2025    CREATININE 1.92 (H) 04/28/2025    CREATININE 1.38 (H) 04/27/2025     CKD in setting of hypertensive, diabetic nephropathy   Baseline Cr: 1.3-1.5  Admit Cr: 1.52 which is now trending high  Nephrology consulted appreciate recs

## 2025-04-29 NOTE — PROGRESS NOTES
Progress Note - Nephrology   Name: Wes Araujo 55 y.o. male I MRN: [id number]  Unit/Bed#: E2 -01 I Date of Admission: 4/26/2025   Date of Service: 4/29/2025 I Hospital Day: 3    Assessment & Plan  ZENAIDA (acute kidney injury) (HCC)  CT of the abdomen pelvis with IV contrast on April 26 with 100 cc of contrast in the setting of relative volume depletion, prerenal azotemia, relative hypoperfusion from blood pressure fluctuations, chronic pancreatitis, HRS from fulminant hepatic failure?  SBP?  Workup: CT scan shows unremarkable kidneys with no hydronephrosis from April 26, will check a PVR today, urine sodium is less than 10, low albumin of 2.3, LFTs were mildly elevated at 81 and 60 which is now improved.  His last urinalysis was from March 16 and he did have 2+ proteinuria.  Will repeat a urinalysis now  Etiology: Contrast associated nephropathy in the setting of contrast administration on April 26 with volume depletion plus/minus ZENAIDA from chronic pancreatitis, ruling out HRS  Plan: Give albumin 25 g every 8 hours for the next 48 hours, check a PVR, check a urinalysis, check an albumin to creatinine ratio. given that urine sodium is less than 10, we will start octreotide to help reduce splanchnic vasodilation associated with acute liver injury and to attempt to restore effective arterial blood volumen if this is contributing to his ZENAIDA but given the MAP above 90 will hold off on midodrine Check an ammonia  Abdominal pain  GI following, unclear etiology, notes reviewed thought to be related to a viral gastroenteritis versus a postinfectious IBS versus an acalculous cholecystitis.  Advancing diet as tolerated starting bowel regimen  No extrahepatic manifestations of cirrhosis, LFTs improved, alk phos is elevated, CT scan shows unremarkable liver function, can check a ammonia level, but no and significant thrombocytopenia with some mild anemia..  Bilirubin is rising  Primary hypertension  Patient's blood pressure  on presentation was 190/60.Patient's home medications were reviewed, not on any ACE or ARB.  Avoid overaggressive blood pressure management  Type 2 diabetes mellitus with hyperglycemia, with long-term current use of insulin ([facility])  Lab Results   Component Value Date    HGBA1C 9.8 (H) 02/05/2025       Recent Labs     04/28/25  1145 04/28/25  1538 04/28/25  2049 04/29/25  0639   POCGLU 106 140 154* 161*       Blood Sugar Average: Last 72 hrs:  (P) 221  Continue glycemic control, avoid hypoglycemia  Alcohol abuse  Efforts with alcohol cessation.  Monitor for withdrawal  Paroxysmal A-fib ([facility])  Monitor heart rate  Hyponatremia  With isotonic fluid and correction of blood glucose.  Sodium has improved  Anemia  In the setting of alcohol abuse, no thrombocytopenia  Seizure ([facility])  No current seizure  Alcohol-induced chronic pancreatitis ([facility])  Volume expansion for now encouraging diet as tolerated  CKD stage 3a, GFR 45-59 ml/min ([facility])  Lab Results   Component Value Date    EGFR 33 04/29/2025    EGFR 38 04/28/2025    EGFR 57 04/27/2025    CREATININE 2.14 (H) 04/29/2025    CREATININE 1.92 (H) 04/28/2025    CREATININE 1.38 (H) 04/27/2025   Avoiding nephrotoxic agents and monitor urine output, check a urinary retention protocol.    I have reviewed the nephrology recommendations including ZENAIDA management, with Slim, and we are in agreement with renal plan including the information outlined above.    Subjective     Patient was seen today.  A little bit more confused.  Asking to go home.    Objective :  Temp:  [97 °F (36.1 °C)-98.1 °F (36.7 °C)] 97.5 °F (36.4 °C)  HR:  [80-97] 97  BP: (113-172)/(72-91) 139/79  Resp:  [18-21] 20  SpO2:  [95 %-97 %] 96 %  O2 Device: None (Room air)    Current Weight: Weight - Scale: 66 kg (145 lb 8.1 oz)  First Weight: Weight - Scale: 66 kg (145 lb 8.1 oz)  I/O         04/27 0701  04/28 0700 04/28 0701  04/29 0700 04/29 0701 04/30 0700    P.O. 480 480 180    I.V. (mL/kg)  993.8 (15.1)     IV  Piggyback       Total Intake(mL/kg) 480 (7.3) 1473.8 (22.3) 180 (2.7)    Urine (mL/kg/hr) 1000 (0.6) 775 (0.5)     Stool 0      Total Output 1000 775     Net -520 +698.8 +180           Unmeasured Urine Occurrence  1 x     Unmeasured Stool Occurrence 1 x            Physical Exam  Vitals and nursing note reviewed.   Constitutional:       General: He is not in acute distress.     Appearance: He is well-developed.   HENT:      Head: Normocephalic and atraumatic.   Eyes:      Conjunctiva/sclera: Conjunctivae normal.   Cardiovascular:      Rate and Rhythm: Normal rate and regular rhythm.      Heart sounds: No murmur heard.  Pulmonary:      Effort: Pulmonary effort is normal. No respiratory distress.      Breath sounds: Normal breath sounds.   Abdominal:      Palpations: Abdomen is soft.      Tenderness: There is no abdominal tenderness.   Musculoskeletal:         General: No swelling.      Cervical back: Neck supple.   Skin:     General: Skin is warm and dry.      Capillary Refill: Capillary refill takes less than 2 seconds.   Neurological:      Mental Status: He is alert. He is disoriented.   Psychiatric:         Mood and Affect: Mood normal.         Medications:    Current Facility-Administered Medications:     acetaminophen (TYLENOL) tablet 975 mg, 975 mg, Oral, Q8H PRN, Samy Bah PA-C, 975 mg at 04/29/25 0832    albumin human (FLEXBUMIN) 25 % injection 25 g, 25 g, Intravenous, Q8H, Reji Esparza MD, 25 g at 04/29/25 1029    aluminum-magnesium hydroxide-simethicone (MAALOX) oral suspension 30 mL, 30 mL, Oral, Q6H PRN, Samy Bah PA-C    amLODIPine (NORVASC) tablet 5 mg, 5 mg, Oral, BID, Samy Bah PA-C, 5 mg at 04/29/25 0814    folic acid (FOLVITE) tablet 1 mg, 1 mg, Oral, Daily, Mojgan Young MD, 1 mg at 04/29/25 0815    heparin (porcine) subcutaneous injection 5,000 Units, 5,000 Units, Subcutaneous, Q8H JAISON, Samy Bah PA-C, 5,000 Units at 04/29/25 0559    hydrALAZINE (APRESOLINE) tablet 25 mg, 25 mg, Oral,  "Q8H JAISON, Samy Bah PA-C, 25 mg at 04/29/25 0559    HYDROmorphone HCl (DILAUDID) injection 0.2 mg, 0.2 mg, Intravenous, Q6H PRN, Samy Bah PA-C, 0.2 mg at 04/28/25 0752    insulin aspart protamine-insulin aspart (NovoLOG 70/30) 100 units/mL subcutaneous injection 4 Units, 4 Units, Subcutaneous, BID AC, Samy Bah PA-C, 4 Units at 04/29/25 0813    insulin lispro (HumALOG/ADMELOG) 100 units/mL subcutaneous injection 1-5 Units, 1-5 Units, Subcutaneous, 4x Daily (AC & HS), 1 Units at 04/29/25 0814 **AND** Fingerstick Glucose (POCT), , , 4x Daily AC and at bedtime, Samy Bah PA-C    naloxone (NARCAN) 0.04 mg/mL syringe 0.04 mg, 0.04 mg, Intravenous, Q1MIN PRN, Samy Bah PA-C    ondansetron (ZOFRAN) injection 4 mg, 4 mg, Intravenous, Q6H PRN, Samy Bah PA-C    oxyCODONE (ROXICODONE) IR tablet 5 mg, 5 mg, Oral, Q6H PRN, Samy Bah PA-C, 5 mg at 04/28/25 2119    oxyCODONE (ROXICODONE) split tablet 2.5 mg, 2.5 mg, Oral, Q6H PRN, Samy Bah PA-C, 2.5 mg at 04/27/25 1121    polyethylene glycol (MIRALAX) packet 17 g, 17 g, Oral, Daily, Reji Esparza MD, 17 g at 04/29/25 1030    thiamine tablet 100 mg, 100 mg, Oral, Daily, Mojgan Young MD, 100 mg at 04/29/25 0814      Lab Results: I have reviewed the following results:  Results from last 7 days   Lab Units 04/29/25  0551 04/28/25  0459 04/27/25  0456 04/26/25  1615   WBC Thousand/uL 10.32* 10.50* 11.44* 10.20*   HEMOGLOBIN g/dL 8.6* 9.8* 9.6* 11.0*   HEMATOCRIT % 24.3* 28.1* 27.4* 30.9*   PLATELETS Thousands/uL 238 243 242 235   POTASSIUM mmol/L 4.3 4.2 3.0* 3.7   CHLORIDE mmol/L 106 105 107 100   CO2 mmol/L 24 24 23 19*   BUN mg/dL 15 15 11 12   CREATININE mg/dL 2.14* 1.92* 1.38* 1.52*   CALCIUM mg/dL 7.8* 7.9* 7.8* 8.3*   MAGNESIUM mg/dL 1.7*  --   --   --    ALBUMIN g/dL 2.3* 2.4* 2.3* 2.8*       Administrative Statements     Portions of the record may have been created with voice recognition software. Occasional wrong word or \"sound a like\" substitutions " may have occurred due to the inherent limitations of voice recognition software. Read the chart carefully and recognize, using context, where substitutions have occurred.If you have any questions, please contact the dictating provider.

## 2025-04-29 NOTE — PLAN OF CARE
Problem: Potential for Falls  Goal: Patient will remain free of falls  Description: INTERVENTIONS:- Educate patient/family on patient safety including physical limitations- Instruct patient to call for assistance with activity - Consult OT/PT to assist with strengthening/mobility - Keep Call bell within reach- Keep bed low and locked with side rails adjusted as appropriate- Keep care items and personal belongings within reach- Initiate and maintain comfort rounds-  INTERVENTIONS:- Educate patient/family on patient safety including physical limitations- Instruct patient to call for assistance with activity - Consult OT/PT to assist with strengthening/mobility - Keep Call bell within reach- Keep bed low and locked with side rails adjusted as appropriate- Keep care items and personal belongings within reach- Initiate and maintain comfort rounds-  Outcome: Progressing     Problem: PAIN - ADULT  Goal: Verbalizes/displays adequate comfort level or baseline comfort level  Description: Interventions:- Encourage patient to monitor pain and request assistance- Assess pain using appropriate pain scale- Administer analgesics based on type and severity of pain and evaluate response- Implement non-pharmacological measures as appropriate and evaluate response- Consider cultural and social influences on pain and pain management- Notify physician/advanced practitioner if interventions unsuccessful or patient reports new pain  Outcome: Progressing     Problem: SAFETY ADULT  Goal: Patient will remain free of falls  Description: INTERVENTIONS:- Educate patient/family on patient safety including physical limitations- Instruct patient to call for assistance with activity - Consult OT/PT to assist with strengthening/mobility - Keep Call bell within reach- Keep bed low and locked with side rails adjusted as appropriate- Keep care items and personal belongings within reach- Initiate and maintain comfort rounds- Make Fall Risk Sign visible  to staff- Offer Toileting every 2 Hours, in advance of need- Initiate/Maintain bed alarm- Obtain necessary fall risk management equipment: - Apply yellow socks and bracelet for high fall risk patients- Consider moving patient to room near nurses station  INTERVENTIONS:- Educate patient/family on patient safety including physical limitations- Instruct patient to call for assistance with activity - Consult OT/PT to assist with strengthening/mobility - Keep Call bell within reach- Keep bed low and locked with side rails adjusted as appropriate- Keep care items and personal belongings within reach- Initiate and maintain comfort rounds-  Outcome: Progressing     Problem: DISCHARGE PLANNING  Goal: Discharge to home or other facility with appropriate resources  Description: INTERVENTIONS:- Identify barriers to discharge w/patient and caregiver- Arrange for needed discharge resources and transportation as appropriate- Identify discharge learning needs (meds, wound care, etc.)- Arrange for interpretive services to assist at discharge as needed- Refer to Case Management Department for coordinating discharge planning if the patient needs post-hospital services based on physician/advanced practitioner order or complex needs related to functional status, cognitive ability, or social support system  Outcome: Progressing     Problem: Knowledge Deficit  Goal: Patient/family/caregiver demonstrates understanding of disease process, treatment plan, medications, and discharge instructions  Description: Complete learning assessment and assess knowledge base.Interventions:- Provide teaching at level of understanding- Provide teaching via preferred learning methods  Outcome: Progressing     Problem: CARDIOVASCULAR - ADULT  Goal: Maintains optimal cardiac output and hemodynamic stability  Description: INTERVENTIONS:- Monitor I/O, vital signs and rhythm- Monitor for S/S and trends of decreased cardiac output- Administer and titrate ordered  vasoactive medications to optimize hemodynamic stability- Assess quality of pulses, skin color and temperature- Assess for signs of decreased coronary artery perfusion- Instruct patient to report change in severity of symptoms  Outcome: Progressing  Goal: Absence of cardiac dysrhythmias or at baseline rhythm  Description: INTERVENTIONS:- Continuous cardiac monitoring, vital signs, obtain 12 lead EKG if ordered- Administer antiarrhythmic and heart rate control medications as ordered- Monitor electrolytes and administer replacement therapy as ordered  Outcome: Progressing

## 2025-04-29 NOTE — PROGRESS NOTES
Progress Note - Hospitalist   Name: Wes Araujo 55 y.o. male I MRN: [id number]  Unit/Bed#: E2 -01 I Date of Admission: 4/26/2025   Date of Service: 4/29/2025 I Hospital Day: 3    Assessment & Plan  Abdominal pain  Patient is a 55-year-old male with past medical history significant for alcohol abuse, hypertension, diabetes, and chronic kidney disease who presented to the ER with abdominal pain, nausea and vomiting     Currently pain is at 3/10 intensity as per patient much improved as compared to before  CT abd/pelvis reviewed showing small bowel thickening suggesting enteritis, mild mesenteric edema and small volume ascites and stable findings of chronic pancreatitis  RUQ U/S: showing gall bladder wall thickening 6-7mm most likely in the setting of ascites with acute cholecystitis less likely, CBD 4mm, negative sanches's  Patient had transaminitis with AST 81: ALT 60 which has not resolved, alkaline phosphatase 639 and total bilirubin 1.89  No diarrhea, with question of enteritis on CT  Patient was evaluated by the GI team: Noted to have moderate-large ascites  IR was consulted for diagnostic and therapeutic paracentesis but given small pockets procedure could not be done.   GI on board agree with plans of albumin trial given worsening kidney failure   If evaluation unremarkable they recommend HIDA scan  Alcohol abuse  History of alcohol use disorder, with history of withdrawal and withdrawal seizures  Patient repeatedly states his last drink was 20 days ago  CIWA  Thiamine/folic acid  No current signs or symptoms of withdrawal    Type 2 diabetes mellitus with hyperglycemia, with long-term current use of insulin (HCC)  Lab Results   Component Value Date    HGBA1C 9.8 (H) 02/05/2025     Recent Labs     04/28/25  1145 04/28/25  1538 04/28/25  2049 04/29/25  0639   POCGLU 106 140 154* 161*   Blood Sugar Average: Last 72 hrs:  (P) 221  outpatient regimen: NPH 4u BID  Patient presented with hyperglycemia secondary  to missed medications  Was restarted on his home regimen, plus Accu-Cheks and sliding scale insulin with improvement  Hyponatremia  Pseudohyponatremia in setting of hyperglycemia, plus history of chronic hyponatremia secondary to excessive free water due to alcohol  Improving  CKD stage 3a, GFR 45-59 ml/min ([facility])  Lab Results   Component Value Date    EGFR 33 04/29/2025    EGFR 38 04/28/2025    EGFR 57 04/27/2025    CREATININE 2.14 (H) 04/29/2025    CREATININE 1.92 (H) 04/28/2025    CREATININE 1.38 (H) 04/27/2025     CKD in setting of hypertensive, diabetic nephropathy   Baseline Cr: 1.3-1.5  Admit Cr: 1.52 which is now trending high  Nephrology consulted appreciate recs    Paroxysmal A-fib ([facility])  History of paroxysmal atrial fibrillation  Currently in sinus rhythm  Patient not prescribed any rate control medications  Not on anticoagulation  Seizure ([facility])  Patient was admitted 10/2024 with witnessed tonic-clonic seizures  Diagnosis metabolic, possibly related to alcohol withdrawal: Was eval by neurology and was not recommended to start antiepileptic therapy  Primary hypertension  Patient presented with markedly elevated blood pressure in the setting of missed medications  Home regimen restarted: Amlodipine 5mg daily, hydralazine 25mg q8h  Continue to monitor and titrate to goal  Anemia  Patient with a history of anemia  Prior EGD with Dieulafoy's lesions  Baseline hemoglobin extremely variable ranging 7-10  Currently hemoglobin 9.6, without evidence of bleeding  Continue to monitor  Alcohol-induced chronic pancreatitis ([facility])  Continue supportive care  ZENAIDA (acute kidney injury) ([facility])  As above under CKD    VTE Pharmacologic Prophylaxis: VTE Score: 3  heparin    Mobility:   Basic Mobility Inpatient Raw Score: 24  JH-HLM Goal: 8: Walk 250 feet or more  JH-HLM Achieved: 6: Walk 10 steps or more    Education and Discussions with Family / Patient: Updated  (father) at bedside.    Current Length of Stay: 3  day(s)  Current Patient Status: Inpatient     Discharge Plan: Anticipate discharge in 24-48 hrs to home.    Code Status: Level 1 - Full Code    Subjective   Patient was seen and examined at bedside, father at bedside, translation services were used ID [id number].  No acute concerns as of yet.    Objective :  Temp:  [97 °F (36.1 °C)-98.1 °F (36.7 °C)] 97.5 °F (36.4 °C)  HR:  [80-97] 97  BP: (113-172)/(72-91) 139/79  Resp:  [18-21] 20  SpO2:  [95 %-97 %] 96 %  O2 Device: None (Room air)    Body mass index is 21.49 kg/m².     Input and Output Summary (last 24 hours):     Intake/Output Summary (Last 24 hours) at 4/29/2025 1036  Last data filed at 4/29/2025 0803  Gross per 24 hour   Intake 1173.75 ml   Output 775 ml   Net 398.75 ml       Physical Exam  Vitals and nursing note reviewed.   Constitutional:       General: He is not in acute distress.     Appearance: He is well-developed.   HENT:      Head: Normocephalic and atraumatic.   Eyes:      Conjunctiva/sclera: Conjunctivae normal.   Cardiovascular:      Rate and Rhythm: Normal rate and regular rhythm.      Heart sounds: No murmur heard.  Pulmonary:      Effort: Pulmonary effort is normal. No respiratory distress.      Breath sounds: Normal breath sounds.   Abdominal:      General: Abdomen is flat. Bowel sounds are normal. There is no distension.      Palpations: Abdomen is soft.      Tenderness: There is no abdominal tenderness.   Musculoskeletal:         General: No swelling.      Cervical back: Neck supple.   Skin:     General: Skin is warm and dry.      Capillary Refill: Capillary refill takes less than 2 seconds.   Neurological:      Mental Status: He is alert and oriented to person, place, and time.   Psychiatric:         Mood and Affect: Mood normal.           Lines/Drains:              Lab Results: I have reviewed the following results:   Results from last 7 days   Lab Units 04/29/25  0551 04/28/25  0459   WBC Thousand/uL 10.32* 10.50*   HEMOGLOBIN g/dL 8.6* 9.8*    HEMATOCRIT % 24.3* 28.1*   PLATELETS Thousands/uL 238 243   SEGS PCT %  --  61   LYMPHO PCT %  --  23   MONO PCT %  --  8   EOS PCT %  --  6     Results from last 7 days   Lab Units 04/29/25  0551   SODIUM mmol/L 135   POTASSIUM mmol/L 4.3   CHLORIDE mmol/L 106   CO2 mmol/L 24   BUN mg/dL 15   CREATININE mg/dL 2.14*   ANION GAP mmol/L 5   CALCIUM mg/dL 7.8*   ALBUMIN g/dL 2.3*   TOTAL BILIRUBIN mg/dL 2.11*   ALK PHOS U/L 467*   ALT U/L 29   AST U/L 30   GLUCOSE RANDOM mg/dL 136     Results from last 7 days   Lab Units 04/29/25  0551   INR  0.94     Results from last 7 days   Lab Units 04/29/25  0639 04/28/25  2049 04/28/25  1538 04/28/25  1145 04/28/25  0620 04/27/25  2055 04/27/25  1528 04/27/25  1123 04/27/25  0754 04/26/25  2327 04/26/25  1832 04/26/25  1600   POC GLUCOSE mg/dl 161* 154* 140 106 237* 142* 183* 200* 154* 344* 388* 443*         Results from last 7 days   Lab Units 04/26/25  1830 04/26/25  1615   LACTIC ACID mmol/L 1.2 2.1*       Recent Cultures (last 7 days):         XR chest 2 views  Result Date: 4/27/2025  Impression: Low lung volumes with bibasilar atelectasis. No pneumonia on subsequent abdomen CT. Small pleural effusions. Workstation performed: [id number]     US right upper quadrant  Result Date: 4/26/2025  Impression: Gallbladder wall thickening in the setting of ascites. Findings most likely reflects the presence of ascites, underlying liver disease, heart failure or hypoproteinemia. Acute cholecystitis is considered less likely. Moderate ascites. Workstation performed: [id number]     CT abdomen pelvis with contrast  Result Date: 4/26/2025  Impression: Enteritis. Mild mesenteric edema and small volume ascites. Small left pleural effusion. Stable findings of chronic pancreatitis. Workstation performed: [id number]         Last 24 Hours Medication List:     Current Facility-Administered Medications:     acetaminophen (TYLENOL) tablet 975 mg, Q8H PRN    albumin human (FLEXBUMIN) 25 % injection  25 g, Q8H    aluminum-magnesium hydroxide-simethicone (MAALOX) oral suspension 30 mL, Q6H PRN    amLODIPine (NORVASC) tablet 5 mg, BID    folic acid (FOLVITE) tablet 1 mg, Daily    heparin (porcine) subcutaneous injection 5,000 Units, Q8H JAISON    hydrALAZINE (APRESOLINE) tablet 25 mg, Q8H JAISON    HYDROmorphone HCl (DILAUDID) injection 0.2 mg, Q6H PRN    insulin aspart protamine-insulin aspart (NovoLOG 70/30) 100 units/mL subcutaneous injection 4 Units, BID AC    insulin lispro (HumALOG/ADMELOG) 100 units/mL subcutaneous injection 1-5 Units, 4x Daily (AC & HS) **AND** Fingerstick Glucose (POCT), 4x Daily AC and at bedtime    naloxone (NARCAN) 0.04 mg/mL syringe 0.04 mg, Q1MIN PRN    ondansetron (ZOFRAN) injection 4 mg, Q6H PRN    oxyCODONE (ROXICODONE) IR tablet 5 mg, Q6H PRN    oxyCODONE (ROXICODONE) split tablet 2.5 mg, Q6H PRN    polyethylene glycol (MIRALAX) packet 17 g, Daily    thiamine tablet 100 mg, Daily    Administrative Statements   Today, Patient Was Seen By: Jazmín Buchanan MD  I have spent a total time of >35 minutes in caring for this patient on the day of the visit/encounter including Diagnostic results, Risks and benefits of tx options, Instructions for management, Patient and family education, Risk factor reductions, Impressions, Documenting in the medical record, and Obtaining or reviewing history  .    **Please Note: This note may have been constructed using a voice recognition system.**

## 2025-04-29 NOTE — ASSESSMENT & PLAN NOTE
Lab Results   Component Value Date    HGBA1C 9.8 (H) 02/05/2025     Recent Labs     04/28/25  1145 04/28/25  1538 04/28/25 2049 04/29/25  0639   POCGLU 106 140 154* 161*   Blood Sugar Average: Last 72 hrs:  (P) 221  outpatient regimen: NPH 4u BID  Patient presented with hyperglycemia secondary to missed medications  Was restarted on his home regimen, plus Accu-Cheks and sliding scale insulin with improvement

## 2025-04-29 NOTE — DISCHARGE INSTR - OTHER ORDERS
Gwen Limon   Certified     [facility], [place] and [facility]  [phone]  Monday-Friday 6:45am-3:15pm    [employer] [facility]  [address]  [place] [address]  [phone]    [facility]  [address] [facility] [place] [address]  [phone]    [facility]   [address] [facility] [place] [address]  [phone]    [facility]   [address] [facility] [address]  [phone]    Joice Ciro  [facility] [place], [place]  Address: [address] [facility], [place] [address]  Phone: [phone]      E[place]ian Speaking  Facilities    [email or web address]  [facility]  Learn more about these services  by contacting [phone]    [facility] [facility] (Some [place]ian speaking staff)   [address], [place] [address]  Phone: [phone]

## 2025-04-29 NOTE — ASSESSMENT & PLAN NOTE
55 y.o. male with history of hypertension, CKD, type 2 diabetes mellitus, chronic pancreatitis and alcohol abuse who presented on 4/26 due to abdominal pain, nausea and vomiting after not taking his insulin in 3 days found to have elevated liver enzymes on admission with AST 81, ALT 60, alkaline phosphatase 639, total bilirubin 1.89 with ZENAIDA and RUQ US showing GBWT sans stones or PCF with a small amount of ascites. His LFTs have since normalized and his bili and ALP continue to downtrend. Ascitic pocket too small to tap. DDx of his pain could be due to chronic panc, viral gastroenteritis, post-infectious IBS, SBP, alc hep, acalculous cholecystitis.     - c/w supportive care and advance diet as tolerated   - start bowel regimen w/ miralax daily. Avoid opiates for pain ctrl   - for worsening ZENAIDA will trial 1g/kg albumin x 48h and assess response.   - alcohol cessation heavily advised

## 2025-04-29 NOTE — ASSESSMENT & PLAN NOTE
Lab Results   Component Value Date    HGBA1C 9.8 (H) 02/05/2025       Recent Labs     04/28/25  1145 04/28/25  1538 04/28/25 2049 04/29/25  0639   POCGLU 106 140 154* 161*       Blood Sugar Average: Last 72 hrs:  (P) 221  Continue glycemic control, avoid hypoglycemia

## 2025-04-29 NOTE — PROGRESS NOTES
Progress Note - Gastroenterology   Name: Wes Araujo 55 y.o. male I MRN: [id number]  Unit/Bed#: E2 -01 I Date of Admission: 4/26/2025   Date of Service: 4/29/2025 I Hospital Day: 3    Assessment & Plan  Abdominal pain  55 y.o. male with history of hypertension, CKD, type 2 diabetes mellitus, chronic pancreatitis and alcohol abuse who presented on 4/26 due to abdominal pain, nausea and vomiting after not taking his insulin in 3 days found to have elevated liver enzymes on admission with AST 81, ALT 60, alkaline phosphatase 639, total bilirubin 1.89 with ZENAIDA and RUQ US showing GBWT sans stones or PCF with a small amount of ascites. His LFTs have since normalized and his bili and ALP continue to downtrend. Ascitic pocket too small to tap. DDx of his pain could be due to chronic panc, viral gastroenteritis, post-infectious IBS, SBP, alc hep, acalculous cholecystitis.     - c/w supportive care and advance diet as tolerated   - start bowel regimen w/ miralax daily. Avoid opiates for pain ctrl   - for worsening ZENAIDA will trial 1g/kg albumin x 48h and assess response.   - alcohol cessation heavily advised  Alcohol-induced chronic pancreatitis (HCC)    Transaminitis (Resolved: 4/29/2025)    Alcohol abuse    Subjective   Still w/ pain. He mainly points to his RUQ. He denies having any BMs while being here.    Objective :  Temp:  [97 °F (36.1 °C)-98.1 °F (36.7 °C)] 97.5 °F (36.4 °C)  HR:  [80-97] 97  BP: (113-172)/(72-91) 139/79  Resp:  [18-21] 20  SpO2:  [95 %-97 %] 96 %  O2 Device: None (Room air)    Physical Exam  Constitutional:       General: He is not in acute distress.     Appearance: Normal appearance.   HENT:      Head: Normocephalic and atraumatic.      Nose: Nose normal.      Mouth/Throat:      Mouth: Mucous membranes are moist.   Eyes:      General: No scleral icterus.     Extraocular Movements: Extraocular movements intact.      Conjunctiva/sclera: Conjunctivae normal.      Pupils: Pupils are equal, round,  and reactive to light.   Cardiovascular:      Rate and Rhythm: Normal rate and regular rhythm.   Pulmonary:      Effort: Pulmonary effort is normal.   Abdominal:      General: Abdomen is flat. There is no distension.      Palpations: There is no mass.      Tenderness: There is abdominal tenderness.   Musculoskeletal:         General: No swelling. Normal range of motion.   Skin:     General: Skin is warm and dry.      Capillary Refill: Capillary refill takes less than 2 seconds.   Neurological:      General: No focal deficit present.      Mental Status: He is alert.   Psychiatric:         Mood and Affect: Mood normal.       Result Review: I have reviewed all relevant labs, imaging and procedure notes/images.

## 2025-04-29 NOTE — ASSESSMENT & PLAN NOTE
CT of the abdomen pelvis with IV contrast on April 26 with 100 cc of contrast in the setting of relative volume depletion, prerenal azotemia, relative hypoperfusion from blood pressure fluctuations, chronic pancreatitis, HRS from fulminant hepatic failure?  SBP?  Workup: CT scan shows unremarkable kidneys with no hydronephrosis from April 26, will check a PVR today, urine sodium is less than 10, low albumin of 2.3, LFTs were mildly elevated at 81 and 60 which is now improved.  His last urinalysis was from March 16 and he did have 2+ proteinuria.  Will repeat a urinalysis now  Etiology: Contrast associated nephropathy in the setting of contrast administration on April 26 with volume depletion plus/minus ZENAIDA from chronic pancreatitis, ruling out HRS  Plan: Give albumin 25 g every 8 hours for the next 48 hours, check a PVR, check a urinalysis, check an albumin to creatinine ratio. given that urine sodium is less than 10, we will start octreotide to help reduce splanchnic vasodilation associated with acute liver injury and to attempt to restore effective arterial blood volumen if this is contributing to his ZENAIDA but given the MAP above 90 will hold off on midodrine Check an ammonia

## 2025-04-30 VITALS
DIASTOLIC BLOOD PRESSURE: 92 MMHG | HEIGHT: 69 IN | BODY MASS INDEX: 21.55 KG/M2 | TEMPERATURE: 97.8 F | OXYGEN SATURATION: 95 % | WEIGHT: 145.5 LBS | HEART RATE: 81 BPM | RESPIRATION RATE: 18 BRPM | SYSTOLIC BLOOD PRESSURE: 179 MMHG

## 2025-04-30 DIAGNOSIS — N17.9 AKI (ACUTE KIDNEY INJURY) (HCC): Primary | ICD-10-CM

## 2025-04-30 LAB
ALBUMIN SERPL BCG-MCNC: 2.9 G/DL (ref 3.5–5)
ALP SERPL-CCNC: 423 U/L (ref 34–104)
ALT SERPL W P-5'-P-CCNC: 24 U/L (ref 7–52)
ANION GAP SERPL CALCULATED.3IONS-SCNC: 4 MMOL/L (ref 4–13)
AST SERPL W P-5'-P-CCNC: 22 U/L (ref 13–39)
BACTERIA UR QL AUTO: ABNORMAL /HPF
BILIRUB SERPL-MCNC: 2.69 MG/DL (ref 0.2–1)
BILIRUB UR QL STRIP: NEGATIVE
BUN SERPL-MCNC: 17 MG/DL (ref 5–25)
CALCIUM ALBUM COR SERPL-MCNC: 9.5 MG/DL (ref 8.3–10.1)
CALCIUM SERPL-MCNC: 8.6 MG/DL (ref 8.4–10.2)
CHLORIDE SERPL-SCNC: 105 MMOL/L (ref 96–108)
CLARITY UR: CLEAR
CO2 SERPL-SCNC: 26 MMOL/L (ref 21–32)
COLOR UR: YELLOW
CREAT SERPL-MCNC: 2.09 MG/DL (ref 0.6–1.3)
CREAT UR-MCNC: 51.6 MG/DL
ERYTHROCYTE [DISTWIDTH] IN BLOOD BY AUTOMATED COUNT: 12.3 % (ref 11.6–15.1)
GFR SERPL CREATININE-BSD FRML MDRD: 34 ML/MIN/1.73SQ M
GLUCOSE SERPL-MCNC: 101 MG/DL (ref 65–140)
GLUCOSE SERPL-MCNC: 138 MG/DL (ref 65–140)
GLUCOSE SERPL-MCNC: 152 MG/DL (ref 65–140)
GLUCOSE SERPL-MCNC: 275 MG/DL (ref 65–140)
GLUCOSE UR STRIP-MCNC: NEGATIVE MG/DL
HCT VFR BLD AUTO: 24.6 % (ref 36.5–49.3)
HGB BLD-MCNC: 8.5 G/DL (ref 12–17)
HGB UR QL STRIP.AUTO: NEGATIVE
KETONES UR STRIP-MCNC: NEGATIVE MG/DL
LEUKOCYTE ESTERASE UR QL STRIP: NEGATIVE
MAGNESIUM SERPL-MCNC: 2.2 MG/DL (ref 1.9–2.7)
MCH RBC QN AUTO: 30.8 PG (ref 26.8–34.3)
MCHC RBC AUTO-ENTMCNC: 34.6 G/DL (ref 31.4–37.4)
MCV RBC AUTO: 89 FL (ref 82–98)
MICROALBUMIN UR-MCNC: 876.9 MG/L
MICROALBUMIN/CREAT 24H UR: 1699 MG/G CREATININE (ref 0–30)
NITRITE UR QL STRIP: NEGATIVE
NON-SQ EPI CELLS URNS QL MICRO: ABNORMAL /HPF
PH UR STRIP.AUTO: 6 [PH]
PLATELET # BLD AUTO: 255 THOUSANDS/UL (ref 149–390)
PMV BLD AUTO: 11.2 FL (ref 8.9–12.7)
POTASSIUM SERPL-SCNC: 5.1 MMOL/L (ref 3.5–5.3)
PROT SERPL-MCNC: 5.2 G/DL (ref 6.4–8.4)
PROT UR STRIP-MCNC: ABNORMAL MG/DL
RBC # BLD AUTO: 2.76 MILLION/UL (ref 3.88–5.62)
RBC #/AREA URNS AUTO: ABNORMAL /HPF
SODIUM SERPL-SCNC: 135 MMOL/L (ref 135–147)
SP GR UR STRIP.AUTO: 1.01 (ref 1–1.03)
UROBILINOGEN UR STRIP-ACNC: <2 MG/DL
WBC # BLD AUTO: 11.11 THOUSAND/UL (ref 4.31–10.16)
WBC #/AREA URNS AUTO: ABNORMAL /HPF

## 2025-04-30 PROCEDURE — 99233 SBSQ HOSP IP/OBS HIGH 50: CPT | Performed by: INTERNAL MEDICINE

## 2025-04-30 PROCEDURE — NC001 PR NO CHARGE

## 2025-04-30 PROCEDURE — 83735 ASSAY OF MAGNESIUM: CPT

## 2025-04-30 PROCEDURE — 80053 COMPREHEN METABOLIC PANEL: CPT

## 2025-04-30 PROCEDURE — 82043 UR ALBUMIN QUANTITATIVE: CPT

## 2025-04-30 PROCEDURE — 82570 ASSAY OF URINE CREATININE: CPT

## 2025-04-30 PROCEDURE — 82948 REAGENT STRIP/BLOOD GLUCOSE: CPT

## 2025-04-30 PROCEDURE — 85027 COMPLETE CBC AUTOMATED: CPT

## 2025-04-30 PROCEDURE — 99239 HOSP IP/OBS DSCHRG MGMT >30: CPT

## 2025-04-30 RX ORDER — AMLODIPINE BESYLATE 5 MG/1
5 TABLET ORAL 2 TIMES DAILY
Status: DISCONTINUED | OUTPATIENT
Start: 2025-04-30 | End: 2025-04-30 | Stop reason: HOSPADM

## 2025-04-30 RX ORDER — HYDRALAZINE HYDROCHLORIDE 25 MG/1
25 TABLET, FILM COATED ORAL EVERY 8 HOURS SCHEDULED
Status: DISCONTINUED | OUTPATIENT
Start: 2025-04-30 | End: 2025-04-30 | Stop reason: HOSPADM

## 2025-04-30 RX ADMIN — ALBUMIN (HUMAN) 25 G: 0.25 INJECTION, SOLUTION INTRAVENOUS at 00:58

## 2025-04-30 RX ADMIN — FOLIC ACID 1 MG: 1 TABLET ORAL at 08:49

## 2025-04-30 RX ADMIN — Medication 100 MG: at 08:49

## 2025-04-30 RX ADMIN — HYDRALAZINE HYDROCHLORIDE 25 MG: 25 TABLET ORAL at 05:08

## 2025-04-30 RX ADMIN — HEPARIN SODIUM 5000 UNITS: 5000 INJECTION INTRAVENOUS; SUBCUTANEOUS at 05:07

## 2025-04-30 RX ADMIN — OCTREOTIDE ACETATE 100 MCG: 100 INJECTION, SOLUTION INTRAVENOUS; SUBCUTANEOUS at 14:56

## 2025-04-30 RX ADMIN — INSULIN ASPART 4 UNITS: 100 INJECTION, SUSPENSION SUBCUTANEOUS at 07:45

## 2025-04-30 RX ADMIN — AMLODIPINE BESYLATE 5 MG: 5 TABLET ORAL at 08:49

## 2025-04-30 RX ADMIN — INSULIN LISPRO 3 UNITS: 100 INJECTION, SOLUTION INTRAVENOUS; SUBCUTANEOUS at 12:28

## 2025-04-30 RX ADMIN — OCTREOTIDE ACETATE 100 MCG: 100 INJECTION, SOLUTION INTRAVENOUS; SUBCUTANEOUS at 05:08

## 2025-04-30 RX ADMIN — HYDRALAZINE HYDROCHLORIDE 25 MG: 25 TABLET ORAL at 14:56

## 2025-04-30 RX ADMIN — HEPARIN SODIUM 5000 UNITS: 5000 INJECTION INTRAVENOUS; SUBCUTANEOUS at 14:56

## 2025-04-30 RX ADMIN — ALBUMIN (HUMAN) 25 G: 0.25 INJECTION, SOLUTION INTRAVENOUS at 08:49

## 2025-04-30 RX ADMIN — POLYETHYLENE GLYCOL 3350 17 G: 17 POWDER, FOR SOLUTION ORAL at 08:49

## 2025-04-30 NOTE — PROGRESS NOTES
"This RN called patient's father to make him aware that patient signed AMA document and to explain implications and consequences of signing this document. Father expressed understanding. Father also states that he does not drive and has not means to pick him up. Father mentioned that they leave 5 blocks away so \"he can walk\".    "

## 2025-04-30 NOTE — ASSESSMENT & PLAN NOTE
Amlodipine 5 mg twice a day  Hydralazine 25 every 8 hours  Although blood pressure intermittently elevated I would hold off on adjusting given ZENAIDA potentially increase hydralazine  Of course avoid ACE inhibitor/ARB/diuretics

## 2025-04-30 NOTE — PLAN OF CARE
Problem: PAIN - ADULT  Goal: Verbalizes/displays adequate comfort level or baseline comfort level  Description: Interventions:- Encourage patient to monitor pain and request assistance- Assess pain using appropriate pain scale- Administer analgesics based on type and severity of pain and evaluate response- Implement non-pharmacological measures as appropriate and evaluate response- Consider cultural and social influences on pain and pain management- Notify physician/advanced practitioner if interventions unsuccessful or patient reports new pain  Outcome: Progressing      Problem: DISCHARGE PLANNING  Goal: Discharge to home or other facility with appropriate resources  Description: INTERVENTIONS:- Identify barriers to discharge w/patient and caregiver- Arrange for needed discharge resources and transportation as appropriate- Identify discharge learning needs (meds, wound care, etc.)- Arrange for interpretive services to assist at discharge as needed- Refer to Case Management Department for coordinating discharge planning if the patient needs post-hospital services based on physician/advanced practitioner order or complex needs related to functional status, cognitive ability, or social support system  Outcome: Progressing     Problem: Knowledge Deficit  Goal: Patient/family/caregiver demonstrates understanding of disease process, treatment plan, medications, and discharge instructions  Description: Complete learning assessment and assess knowledge base.Interventions:- Provide teaching at level of understanding- Provide teaching via preferred learning methods  Outcome: Progressing     Problem: CARDIOVASCULAR - ADULT  Goal: Maintains optimal cardiac output and hemodynamic stability  Description: INTERVENTIONS:- Monitor I/O, vital signs and rhythm- Monitor for S/S and trends of decreased cardiac output- Administer and titrate ordered vasoactive medications to optimize hemodynamic stability- Assess quality of pulses,  skin color and temperature- Assess for signs of decreased coronary artery perfusion- Instruct patient to report change in severity of symptoms  Outcome: Progressing  Goal: Absence of cardiac dysrhythmias or at baseline rhythm  Description: INTERVENTIONS:- Continuous cardiac monitoring, vital signs, obtain 12 lead EKG if ordered- Administer antiarrhythmic and heart rate control medications as ordered- Monitor electrolytes and administer replacement therapy as ordered  Outcome: Progressing

## 2025-04-30 NOTE — ASSESSMENT & PLAN NOTE
Baseline creatinine may be closer to 1.2-1.43 recently  Etiology unclear may be related to HRS chronically/hypertensive nephrosclerosis  Will need outpatient follow-up and further investigation and quantification of proteinuria once stable

## 2025-04-30 NOTE — DISCHARGE SUMMARY
Discharge Summary - Hospitalist   Name: Wes Araujo 55 y.o. male I MRN: [id number]  Unit/Bed#: E2 -01 I Date of Admission: 4/26/2025   Date of Service: 4/30/2025 I Hospital Day: 4     Assessment & Plan  Abdominal pain  Patient is a 55-year-old male with past medical history significant for alcohol abuse, hypertension, diabetes, and chronic kidney disease who presented to the ER with abdominal pain, nausea and vomiting     Currently pain is at 3/10 intensity as per patient much improved as compared to before  CT abd/pelvis reviewed showing small bowel thickening suggesting enteritis, mild mesenteric edema and small volume ascites and stable findings of chronic pancreatitis  RUQ U/S: showing gall bladder wall thickening 6-7mm most likely in the setting of ascites with acute cholecystitis less likely, CBD 4mm, negative sanches's  Patient had transaminitis with AST 81: ALT 60 which has not resolved, alkaline phosphatase 639 and total bilirubin 1.89  No diarrhea, with question of enteritis on CT  Patient was evaluated by the GI team: Noted to have moderate-large ascites  IR was consulted for diagnostic and therapeutic paracentesis but given small pockets procedure could not be done.   GI on board agree with plans of albumin trial given worsening kidney failure   If evaluation unremarkable they recommend HIDA scan  Alcohol abuse  History of alcohol use disorder, with history of withdrawal and withdrawal seizures  Patient repeatedly states his last drink was 20 days ago  CIWA  Thiamine/folic acid  No current signs or symptoms of withdrawal    Type 2 diabetes mellitus with hyperglycemia, with long-term current use of insulin (HCC)  Lab Results   Component Value Date    HGBA1C 9.8 (H) 02/05/2025     Recent Labs     04/29/25  2056 04/30/25  0606 04/30/25  1110 04/30/25  1535   POCGLU 161* 152* 275* 101   Blood Sugar Average: Last 72 hrs:  (P) 158  outpatient regimen: NPH 4u BID  Patient presented with hyperglycemia  secondary to missed medications  Was restarted on his home regimen, plus Accu-Cheks and sliding scale insulin with improvement  Hyponatremia  Pseudohyponatremia in setting of hyperglycemia, plus history of chronic hyponatremia secondary to excessive free water due to alcohol  Improving  CKD stage 3a, GFR 45-59 ml/min (Formerly [facility])  Lab Results   Component Value Date    EGFR 34 04/30/2025    EGFR 33 04/29/2025    EGFR 38 04/28/2025    CREATININE 2.09 (H) 04/30/2025    CREATININE 2.14 (H) 04/29/2025    CREATININE 1.92 (H) 04/28/2025     CKD in setting of hypertensive, diabetic nephropathy   Baseline Cr: 1.3-1.5  Admit Cr: 1.52 which trended high and now has plateaued to around 2.09  Nephrology consulted appreciate recs  Patient on albumin 25 g every 8 hours  PVR 0 in the morning after patient voided urine  Continue with octreotide but holding off on midodrine given elevated blood pressure    Paroxysmal A-fib ([facility])  History of paroxysmal atrial fibrillation  Currently in sinus rhythm  Patient not prescribed any rate control medications  Not on anticoagulation  Seizure ([facility])  Patient was admitted 10/2024 with witnessed tonic-clonic seizures  Diagnosis metabolic, possibly related to alcohol withdrawal: Was eval by neurology and was not recommended to start antiepileptic therapy  Primary hypertension  Patient presented with markedly elevated blood pressure in the setting of missed medications  Home regimen restarted: Amlodipine 5mg daily, hydralazine 25mg q8h  Continue to monitor and titrate to goal  Anemia  Patient with a history of anemia  Prior EGD with Dieulafoy's lesions  Baseline hemoglobin extremely variable ranging 7-10  Currently hemoglobin 9.6, without evidence of bleeding  Continue to monitor  Alcohol-induced chronic pancreatitis (HCC)  Continue supportive care  ZENAIDA (acute kidney injury) ([facility])  As above under CKD     Medical Problems       Resolved Problems  Date Reviewed: 10/24/2024          Resolved    Transaminitis  "4/29/2025     Resolved by  Reji Esparza MD    Hypertensive urgency 4/27/2025     Resolved by  Mojgan Young MD        Discharging Physician / Practitioner: Jazmín Buchanan MD  PCP: Rush Kebede MD  Admission Date:   Admission Orders (From admission, onward)       Ordered        04/26/25 2124  INPATIENT ADMISSION  Once                          Discharge Date: 04/30/25    Consultations During Hospital Stay:  GI  Nephro    Procedures Performed:   NA    Significant Findings / Test Results:   XR chest 2 views  Result Date: 4/27/2025  Impression: Low lung volumes with bibasilar atelectasis. No pneumonia on subsequent abdomen CT. Small pleural effusions. Workstation performed: [id number]     US right upper quadrant  Result Date: 4/26/2025  Impression: Gallbladder wall thickening in the setting of ascites. Findings most likely reflects the presence of ascites, underlying liver disease, heart failure or hypoproteinemia. Acute cholecystitis is considered less likely. Moderate ascites. Workstation performed: [id number]     CT abdomen pelvis with contrast  Result Date: 4/26/2025  Impression: Enteritis. Mild mesenteric edema and small volume ascites. Small left pleural effusion. Stable findings of chronic pancreatitis. Workstation performed: [id number]     Lab Results   Component Value Date    WBC 11.11 (H) 04/30/2025    HGB 8.5 (L) 04/30/2025    HCT 24.6 (L) 04/30/2025    MCV 89 04/30/2025     04/30/2025     Lab Results   Component Value Date    SODIUM 135 04/30/2025    K 5.1 04/30/2025     04/30/2025    CO2 26 04/30/2025    BUN 17 04/30/2025    CREATININE 2.09 (H) 04/30/2025    GLUC 138 04/30/2025    CALCIUM 8.6 04/30/2025     Lab Results   Component Value Date    WBC 11.11 (H) 04/30/2025    HGB 8.5 (L) 04/30/2025    HCT 24.6 (L) 04/30/2025    MCV 89 04/30/2025     04/30/2025     Lab Results   Component Value Date    HGBA1C 9.8 (H) 02/05/2025     No results found for: \"CHOLESTEROL\"  Lab " "Results   Component Value Date    HDL 57 04/11/2018     Lab Results   Component Value Date    TRIG 79 03/12/2024    TRIG 96 04/11/2018     No results found for: \"NONHDLC\"  Lab Results   Component Value Date    NJF8JKBGVIVI 0.910 02/01/2024     Lab Results   Component Value Date    ALT 24 04/30/2025    AST 22 04/30/2025    GGT 1,920 (H) 04/26/2025    ALKPHOS 423 (H) 04/30/2025    BILITOT 0.4 04/11/2018        Incidental Findings:   NA  as above     Test Results Pending at Discharge (will require follow up):   Tibc panel   Ferritin       Outpatient Tests Requested:  NA    Complications:  NA    Reason for Admission: abd pain    Hospital Course:   Wes Araujo is a 55 y.o. male patient who originally presented to the hospital on 4/26/2025 due to abd pain hyperglycemia    Pt came with abd pain and hyperglycemia, BS were getting in control , GI an nephrology on board to manage his Elevated liver function and kidney derangements.     Pt stated that he does not ant to stay in hospital as doesn't get what he wants to eat. I explained to him that he is on carbohydrate consistent diet and explained the risk on not following , pt still insisted to elave, I also told pt that if he doesn't want carbohydrate consistent diet I can place him on regular diet if he is okay with the risk pertained to it.     Pt stated he still doesn't want to stay as he is tired of hospital and want to eat out and all.     I counseled patients about the detrimental effects of leaving against medical advice and pt insisted, RN called the father who showed understanding but pt didn't agree an dieft ama after signing the ama form     The RN was fluent in English language and helped with all translation     Condition at Discharge: fair    Discharge Day Visit / Exam:   Subjective:  Pt seen and examined   Vitals: Blood Pressure: (!) 179/92 (patient left AMA) (04/30/25 1537)  Pulse: 81 (04/30/25 1537)  Temperature: 97.8 °F (36.6 °C) (04/30/25 1537)  Temp " "Source: Temporal (04/30/25 1537)  Respirations: 18 (04/30/25 1537)  Height: 5' 9\" (175.3 cm) (04/29/25 0832)  Weight - Scale: 66 kg (145 lb 8.1 oz) (04/29/25 0832)  SpO2: 95 % (04/30/25 1537)  Physical Exam AS per progress note     Discussion with Family: Updated  (father) via phone.    Discharge instructions/Information to patient and family:   See after visit summary for information provided to patient and family.      Provisions for Follow-Up Care:  See after visit summary for information related to follow-up care and any pertinent home health orders.      Mobility at time of Discharge:   Basic Mobility Inpatient Raw Score: 24  JH-HLM Goal: 8: Walk 250 feet or more  JH-HLM Achieved: 6: Walk 10 steps or more       Disposition:   Home    Planned Readmission: NA    Discharge Medications:  See after visit summary for reconciled discharge medications provided to patient and/or family.      Administrative Statements   Discharge Statement:  I have spent a total time of >40 minutes in caring for this patient on the day of the visit/encounter. >30 minutes of time was spent on: Diagnostic results, Risks and benefits of tx options, Instructions for management, Patient and family education, Risk factor reductions, Impressions, Documenting in the medical record, and Reviewing / ordering tests, medicine, procedures  .    **Please Note: This note may have been constructed using a voice recognition system**  "

## 2025-04-30 NOTE — CASE MANAGEMENT
Case Management Assessment & Discharge Planning Note    Patient name Wes Araujo  Location [facility] /E2 -* MRN [id number]  : 1970 Date 2025       Current Admission Date: 2025  Current Admission Diagnosis:Abdominal pain   Patient Active Problem List    Diagnosis Date Noted Date Diagnosed    Abdominal pain 2025     CKD stage 3a, GFR 45-59 ml/min (HCC) 2025     Alcohol-induced chronic pancreatitis (HCC) 2024     Seizure (HCC) 2022     Bilateral hearing loss 2020     Anemia 2019     Hyponatremia 2019     History of atrial fibrillation 2019     ZENAIDA (acute kidney injury) (HCC) 2019     Primary hypertension      Type 2 diabetes mellitus with hyperglycemia, with long-term current use of insulin ([facility])      Alcohol abuse      Paroxysmal A-fib (HCC)        LOS (days): 4  Geometric Mean LOS (GMLOS) (days):   Days to GMLOS:     OBJECTIVE:    Risk of Unplanned Readmission Score: 20.71         Current admission status: Inpatient       Preferred Pharmacy:    [facility] [place] [place] - [address] [place] [address]  Phone: [phone] Fax: [phone]    [facility] [place] [facility],  [address] [place] [address],  [place] [address]  Phone: [phone] Fax: [phone]    [facility]acy #[id number] [place], [place] - [address] [address]  Phone: [phone] Fax: [phone]    Primary Care Provider: Rush Kebede MD    Primary Insurance: [employer]  Secondary Insurance:     ASSESSMENT:  Active Health Care Proxies       Wes Araujo Health Care Representative - Father   Primary Phone: [phone] (Mobile)  Home Phone: [phone]                           Readmission Root Cause  30 Day Readmission: No    Patient Information  Admitted from:: Home  Mental Status: Alert  During Assessment patient was  accompanied by: Not accompanied during assessment  Assessment information provided by:: Patient  Primary Caregiver: Self  Support Systems: Parent, Self  County of Residence: [place]  What city do you live in?: [place]  Home entry access options. Select all that apply.: Stairs  Number of steps to enter home.: One Flight  Do the steps have railings?: Yes  Type of Current Residence: Apartment  Floor Level: 2  Upon entering residence, is there a bedroom on the main floor (no further steps)?: Yes  Upon entering residence, is there a bathroom on the main floor (no further steps)?: Yes  Living Arrangements: Lives w/ Parent(s)  Is patient a ?: No    Activities of Daily Living Prior to Admission  Functional Status: Independent  Completes ADLs independently?: Yes  Ambulates independently?: Yes  Does patient use assisted devices?: No  Does patient currently own DME?: No  Does patient have a history of Outpatient Therapy (PT/OT)?: No  Does the patient have a history of Short-Term Rehab?: No  Does patient have a history of HHC?: No  Does patient currently have HHC?: No         Patient Information Continued  Income Source: SSI/SSD  Does patient have prescription coverage?: Yes  Can the patient afford their medications and any related supplies (such as glucometers or test strips)?: Yes  Does patient receive dialysis treatments?: No  Does patient have a history of substance abuse?: Yes  Historical substance use preference: Alcohol/ETOH, Cocaine  History of Withdrawal Symptoms: Denies past symptoms  Is patient currently in treatment for substance abuse?: N/A - sober (reports he has not had a drink in roughly 20ish days)  Does patient have a history of Mental Health Diagnosis?: No         Means of Transportation  [facility]          DISCHARGE DETAILS:    Discharge planning discussed with:: Patient  Freedom of Choice:  (no needs at this time)     CM contacted family/caregiver?: Yes  Were  Treatment Team discharge recommendations reviewed with patient/caregiver?: Yes  Did patient/caregiver verbalize understanding of patient care needs?: Yes       Contacts  Patient Contacts: Wes Araujo  Relationship to Patient:: Family  Contact Method: Phone  Phone Number: [phone]  Reason/Outcome: Emergency Contact    Requested Home Health Care         Is the patient interested in HHC at discharge?: No    DME Referral Provided  Referral made for DME?: No    Other Referral/Resources/Interventions Provided:  Interventions: None Indicated         Treatment Team Recommendation: Home  Discharge Destination Plan:: Home  Transport at Discharge : Family                                      Additional Comments: Met with pt at the bedside to complete assessment. CM did inform pt that OPCM has been trying to contact and the importance of him calling back to get assistance with any OP needs he might have. Pt reported understanding. Pt did not address any needs at this time. CM will continue to follow. Completed using [employer] interpretor services.

## 2025-04-30 NOTE — PROGRESS NOTES
Progress Note - Hospitalist   Name: Wes Araujo 55 y.o. male I MRN: [id number]  Unit/Bed#: E2 -01 I Date of Admission: 4/26/2025   Date of Service: 4/30/2025 I Hospital Day: 4    Assessment & Plan  Abdominal pain  Patient is a 55-year-old male with past medical history significant for alcohol abuse, hypertension, diabetes, and chronic kidney disease who presented to the ER with abdominal pain, nausea and vomiting     Currently pain is at 3/10 intensity as per patient much improved as compared to before  CT abd/pelvis reviewed showing small bowel thickening suggesting enteritis, mild mesenteric edema and small volume ascites and stable findings of chronic pancreatitis  RUQ U/S: showing gall bladder wall thickening 6-7mm most likely in the setting of ascites with acute cholecystitis less likely, CBD 4mm, negative sanches's  Patient had transaminitis with AST 81: ALT 60 which has not resolved, alkaline phosphatase 639 and total bilirubin 1.89  No diarrhea, with question of enteritis on CT  Patient was evaluated by the GI team: Noted to have moderate-large ascites  IR was consulted for diagnostic and therapeutic paracentesis but given small pockets procedure could not be done.   GI on board agree with plans of albumin trial given worsening kidney failure   If evaluation unremarkable they recommend HIDA scan  Alcohol abuse  History of alcohol use disorder, with history of withdrawal and withdrawal seizures  Patient repeatedly states his last drink was 20 days ago  CIWA  Thiamine/folic acid  No current signs or symptoms of withdrawal    Type 2 diabetes mellitus with hyperglycemia, with long-term current use of insulin (HCC)  Lab Results   Component Value Date    HGBA1C 9.8 (H) 02/05/2025     Recent Labs     04/29/25  1636 04/29/25  2056 04/30/25  0606 04/30/25  1110   POCGLU 86 161* 152* 275*   Blood Sugar Average: Last 72 hrs:  (P) [id number]  outpatient regimen: NPH 4u BID  Patient presented with  hyperglycemia secondary to missed medications  Was restarted on his home regimen, plus Accu-Cheks and sliding scale insulin with improvement  Hyponatremia  Pseudohyponatremia in setting of hyperglycemia, plus history of chronic hyponatremia secondary to excessive free water due to alcohol  Improving  CKD stage 3a, GFR 45-59 ml/min ([facility])  Lab Results   Component Value Date    EGFR 34 04/30/2025    EGFR 33 04/29/2025    EGFR 38 04/28/2025    CREATININE 2.09 (H) 04/30/2025    CREATININE 2.14 (H) 04/29/2025    CREATININE 1.92 (H) 04/28/2025     CKD in setting of hypertensive, diabetic nephropathy   Baseline Cr: 1.3-1.5  Admit Cr: 1.52 which trended high and now has plateaued to around 2.09  Nephrology consulted appreciate recs  Patient on albumin 25 g every 8 hours  PVR 0 in the morning after patient voided urine  Continue with octreotide but holding off on midodrine given elevated blood pressure    Paroxysmal A-fib ([facility])  History of paroxysmal atrial fibrillation  Currently in sinus rhythm  Patient not prescribed any rate control medications  Not on anticoagulation  Seizure ([facility])  Patient was admitted 10/2024 with witnessed tonic-clonic seizures  Diagnosis metabolic, possibly related to alcohol withdrawal: Was eval by neurology and was not recommended to start antiepileptic therapy  Primary hypertension  Patient presented with markedly elevated blood pressure in the setting of missed medications  Home regimen restarted: Amlodipine 5mg daily, hydralazine 25mg q8h  Continue to monitor and titrate to goal  Anemia  Patient with a history of anemia  Prior EGD with Dieulafoy's lesions  Baseline hemoglobin extremely variable ranging 7-10  Currently hemoglobin 9.6, without evidence of bleeding  Continue to monitor  Alcohol-induced chronic pancreatitis ([facility])  Continue supportive care  ZENAIDA (acute kidney injury) ([facility])  As above under CKD    VTE Pharmacologic Prophylaxis: VTE Score: 3  heparin    Mobility:   Basic Mobility  Inpatient Raw Score: 24  JH-HLM Goal: 8: Walk 250 feet or more  JH-HLM Achieved: 6: Walk 10 steps or more    Education and Discussions with Family / Patient: Updated  (father) at bedside.    Current Length of Stay: 4 day(s)  Current Patient Status: Inpatient     Discharge Plan: Anticipate discharge in 24-48 hrs to home.    Code Status: Level 1 - Full Code    Subjective   Examined bedside.  Translation services were used ID [id number].  Patient's father was also at bedside.  Patient stated abdominal pain is much improved now    Objective :  Temp:  [97.6 °F (36.4 °C)-98.3 °F (36.8 °C)] 98.2 °F (36.8 °C)  HR:  [81-98] 88  BP: (137-180)/(70-93) 164/70  Resp:  [18-19] 18  SpO2:  [94 %-98 %] 94 %  O2 Device: None (Room air)    Body mass index is 21.49 kg/m².     Input and Output Summary (last 24 hours):     Intake/Output Summary (Last 24 hours) at 4/30/2025 1250  Last data filed at 4/30/2025 1059  Gross per 24 hour   Intake 120 ml   Output 400 ml   Net -280 ml       Physical Exam  Vitals and nursing note reviewed.   Constitutional:       General: He is not in acute distress.     Appearance: He is well-developed.   HENT:      Head: Normocephalic and atraumatic.   Eyes:      Conjunctiva/sclera: Conjunctivae normal.   Cardiovascular:      Rate and Rhythm: Normal rate and regular rhythm.      Heart sounds: No murmur heard.  Pulmonary:      Effort: Pulmonary effort is normal. No respiratory distress.      Breath sounds: Normal breath sounds.   Abdominal:      General: Abdomen is flat. Bowel sounds are normal. There is no distension.      Palpations: Abdomen is soft.      Tenderness: There is no abdominal tenderness.   Musculoskeletal:         General: No swelling.      Cervical back: Neck supple.   Skin:     General: Skin is warm and dry.      Capillary Refill: Capillary refill takes less than 2 seconds.   Neurological:      Mental Status: He is alert and oriented to person, place, and time.   Psychiatric:          Mood and Affect: Mood normal.           Lines/Drains:              Lab Results: I have reviewed the following results:   Results from last 7 days   Lab Units 04/30/25  0508 04/29/25  0551 04/28/25  0459   WBC Thousand/uL 11.11*   < > 10.50*   HEMOGLOBIN g/dL 8.5*   < > 9.8*   HEMATOCRIT % 24.6*   < > 28.1*   PLATELETS Thousands/uL 255   < > 243   SEGS PCT %  --   --  61   LYMPHO PCT %  --   --  23   MONO PCT %  --   --  8   EOS PCT %  --   --  6    < > = values in this interval not displayed.     Results from last 7 days   Lab Units 04/30/25  0508   SODIUM mmol/L 135   POTASSIUM mmol/L 5.1   CHLORIDE mmol/L 105   CO2 mmol/L 26   BUN mg/dL 17   CREATININE mg/dL 2.09*   ANION GAP mmol/L 4   CALCIUM mg/dL 8.6   ALBUMIN g/dL 2.9*   TOTAL BILIRUBIN mg/dL 2.69*   ALK PHOS U/L 423*   ALT U/L 24   AST U/L 22   GLUCOSE RANDOM mg/dL 138     Results from last 7 days   Lab Units 04/29/25  0551   INR  0.94     Results from last 7 days   Lab Units 04/30/25  1110 04/30/25  0606 04/29/25  2056 04/29/25  1636 04/29/25  1127 04/29/25  0639 04/28/25  2049 04/28/25  1538 04/28/25  1145 04/28/25  0620 04/27/25  2055 04/27/25  1528   POC GLUCOSE mg/dl 275* 152* 161* 86 118 161* 154* 140 106 237* 142* 183*         Results from last 7 days   Lab Units 04/26/25  1830 04/26/25  1615   LACTIC ACID mmol/L 1.2 2.1*       Recent Cultures (last 7 days):         XR chest 2 views  Result Date: 4/27/2025  Impression: Low lung volumes with bibasilar atelectasis. No pneumonia on subsequent abdomen CT. Small pleural effusions. Workstation performed: [id number]     US right upper quadrant  Result Date: 4/26/2025  Impression: Gallbladder wall thickening in the setting of ascites. Findings most likely reflects the presence of ascites, underlying liver disease, heart failure or hypoproteinemia. Acute cholecystitis is considered less likely. Moderate ascites. Workstation performed: [id number]     CT abdomen pelvis with contrast  Result Date:  4/26/2025  Impression: Enteritis. Mild mesenteric edema and small volume ascites. Small left pleural effusion. Stable findings of chronic pancreatitis. Workstation performed: [id number]         Last 24 Hours Medication List:     Current Facility-Administered Medications:     acetaminophen (TYLENOL) tablet 975 mg, Q8H PRN    albumin human (FLEXBUMIN) 25 % injection 25 g, Q8H    aluminum-magnesium hydroxide-simethicone (MAALOX) oral suspension 30 mL, Q6H PRN    amLODIPine (NORVASC) tablet 5 mg, BID    folic acid (FOLVITE) tablet 1 mg, Daily    heparin (porcine) subcutaneous injection 5,000 Units, Q8H JAISON    hydrALAZINE (APRESOLINE) tablet 25 mg, Q8H JAISON    HYDROmorphone HCl (DILAUDID) injection 0.2 mg, Q6H PRN    insulin aspart protamine-insulin aspart (NovoLOG 70/30) 100 units/mL subcutaneous injection 4 Units, BID AC    insulin lispro (HumALOG/ADMELOG) 100 units/mL subcutaneous injection 1-5 Units, 4x Daily (AC & HS) **AND** Fingerstick Glucose (POCT), 4x Daily AC and at bedtime    naloxone (NARCAN) 0.04 mg/mL syringe 0.04 mg, Q1MIN PRN    octreotide (SandoSTATIN) injection 100 mcg, Q8H JAISON    ondansetron (ZOFRAN) injection 4 mg, Q6H PRN    oxyCODONE (ROXICODONE) IR tablet 5 mg, Q6H PRN    oxyCODONE (ROXICODONE) split tablet 2.5 mg, Q6H PRN    polyethylene glycol (MIRALAX) packet 17 g, Daily    thiamine tablet 100 mg, Daily    Administrative Statements   Today, Patient Was Seen By: Jazmín Buchanan MD  I have spent a total time of >35 minutes in caring for this patient on the day of the visit/encounter including Prognosis, Risks and benefits of tx options, Patient and family education, Importance of tx compliance, Risk factor reductions, Impressions, Counseling / Coordination of care, Documenting in the medical record, Reviewing/placing orders in the medical record (including tests, medications, and/or procedures), Obtaining or reviewing history  , and Communicating with other healthcare professionals .    **Please  Note: This note may have been constructed using a voice recognition system.**

## 2025-04-30 NOTE — PLAN OF CARE
Problem: PAIN - ADULT  Goal: Verbalizes/displays adequate comfort level or baseline comfort level  Description: Interventions:- Encourage patient to monitor pain and request assistance- Assess pain using appropriate pain scale- Administer analgesics based on type and severity of pain and evaluate response- Implement non-pharmacological measures as appropriate and evaluate response- Consider cultural and social influences on pain and pain management- Notify physician/advanced practitioner if interventions unsuccessful or patient reports new pain  Outcome: Progressing     Problem: Potential for Falls  Goal: Patient will remain free of falls  Description: INTERVENTIONS:- Educate patient/family on patient safety including physical limitations- Instruct patient to call for assistance with activity - Consult OT/PT to assist with strengthening/mobility - Keep Call bell within reach- Keep bed low and locked with side rails adjusted as appropriate- Keep care items and personal belongings within reach- Initiate and maintain comfort rounds- Make Fall Risk Sign visible to staff- Offer Toileting every 4 Hours, in advance of need- Initiate/Maintain bedalarm- Obtain necessary fall risk management equipment: walker- Apply yellow socks and bracelet for high fall risk patients- Consider moving patient to room near nurses station  INTERVENTIONS:- Educate patient/family on patient safety including physical limitations- Instruct patient to call for assistance with activity - Consult OT/PT to assist with strengthening/mobility - Keep Call bell within reach- Keep bed low and locked with side rails adjusted as appropriate- Keep care items and personal belongings within reach- Initiate and maintain comfort rounds- Make Fall Risk Sign visible to staff- Offer Toileting every 4 Hours, in advance of need- Initiate/Maintain bed alarm- Obtain necessary fall risk management equipment: walker- Apply yellow socks and bracelet for high fall risk  patients- Consider moving patient to room near nurses station  Outcome: Progressing     Problem: Knowledge Deficit  Goal: Patient/family/caregiver demonstrates understanding of disease process, treatment plan, medications, and discharge instructions  Description: Complete learning assessment and assess knowledge base.Interventions:- Provide teaching at level of understanding- Provide teaching via preferred learning methods  Outcome: Progressing     Problem: DISCHARGE PLANNING  Goal: Discharge to home or other facility with appropriate resources  Description: INTERVENTIONS:- Identify barriers to discharge w/patient and caregiver- Arrange for needed discharge resources and transportation as appropriate- Identify discharge learning needs (meds, wound care, etc.)- Arrange for interpretive services to assist at discharge as needed- Refer to Case Management Department for coordinating discharge planning if the patient needs post-hospital services based on physician/advanced practitioner order or complex needs related to functional status, cognitive ability, or social support system  Outcome: Progressing

## 2025-04-30 NOTE — PROGRESS NOTES
"Certified  assisted Dr. Buchanan today. Patient referred he wanted to get discharged because he cannot eat what he wants. Dr. Buchanan explained pros and cons of patient's current medical situation. Patient repeatedly stated \"just give me the paper and I will sign it. I am not staying if I cannot eat what I like and what I wants\". Dr Buchanan proceeded to explain implications of leaving against medical advice, including death. Patient responded \"that is fine, I just want to go\". [place] AMA document signed.  served as witnessed. IV line was removed.   "

## 2025-04-30 NOTE — ASSESSMENT & PLAN NOTE
GI following, unclear etiology, notes reviewed thought to be related to a viral gastroenteritis versus a postinfectious IBS versus an acalculous cholecystitis.  Advancing diet as tolerated starting bowel regimen

## 2025-04-30 NOTE — ASSESSMENT & PLAN NOTE
Workup:   CT scan shows unremarkable kidneys with no hydronephrosis from April 26,  urine sodium is less than 10  Repeat proteinuria from 4/29/2025 2+ proteinuria unchanged no RBCs no WBCs seen  Etiology: Contrast associated nephropathy in the setting of contrast administration on April 26 with volume depletion plus/minus ZENAIDA from chronic pancreatitis, ruling out HRS    Current creatinine: Slightly better at 2.09    Plan:  -Give albumin 25 g every 8 hours to continue for now  -Check a PVR with bladder scans  - Continue octreotide but hold midodrine given elevated blood pressure

## 2025-04-30 NOTE — RESTORATIVE TECHNICIAN NOTE
Restorative Technician Note      Patient Name: Wes Araujo     Restorative Tech Visit Date: 04/30/25  Note Type: Mobility  Patient Position Upon Consult: Supine  Activity Performed: Ambulated  Patient Position at End of Consult: All needs within reach; Supine

## 2025-04-30 NOTE — ASSESSMENT & PLAN NOTE
Lab Results   Component Value Date    EGFR 34 04/30/2025    EGFR 33 04/29/2025    EGFR 38 04/28/2025    CREATININE 2.09 (H) 04/30/2025    CREATININE 2.14 (H) 04/29/2025    CREATININE 1.92 (H) 04/28/2025     CKD in setting of hypertensive, diabetic nephropathy   Baseline Cr: 1.3-1.5  Admit Cr: 1.52 which trended high and now has plateaued to around 2.09  Nephrology consulted appreciate recs  Patient on albumin 25 g every 8 hours  PVR 0 in the morning after patient voided urine  Continue with octreotide but holding off on midodrine given elevated blood pressure

## 2025-04-30 NOTE — ASSESSMENT & PLAN NOTE
Lab Results   Component Value Date    HGBA1C 9.8 (H) 02/05/2025     Recent Labs     04/29/25  2056 04/30/25  0606 04/30/25  1110 04/30/25  1535   POCGLU 161* 152* 275* 101   Blood Sugar Average: Last 72 hrs:  (P) 158  outpatient regimen: NPH 4u BID  Patient presented with hyperglycemia secondary to missed medications  Was restarted on his home regimen, plus Accu-Cheks and sliding scale insulin with improvement

## 2025-04-30 NOTE — PROGRESS NOTES
Progress Note - Nephrology   Name: Wes Araujo 55 y.o. male I MRN: [id number]  Unit/Bed#: E2 -01 I Date of Admission: 4/26/2025   Date of Service: 4/30/2025 I Hospital Day: 4     Assessment & Plan  ZENAIDA (acute kidney injury) ([facility])    Workup:   CT scan shows unremarkable kidneys with no hydronephrosis from April 26,  urine sodium is less than 10  Repeat proteinuria from 4/29/2025 2+ proteinuria unchanged no RBCs no WBCs seen  Etiology: Contrast associated nephropathy in the setting of contrast administration on April 26 with volume depletion plus/minus ZENAIDA from chronic pancreatitis, ruling out HRS    Current creatinine: Slightly better at 2.09    Plan:  -Give albumin 25 g every 8 hours to continue for now  -Check a PVR with bladder scans  - Continue octreotide but hold midodrine given elevated blood pressure  CKD stage 3a, GFR 45-59 ml/min ([facility])  Baseline creatinine may be closer to 1.2-1.43 recently  Etiology unclear may be related to HRS chronically/hypertensive nephrosclerosis  Will need outpatient follow-up and further investigation and quantification of proteinuria once stable  Abdominal pain  GI following, unclear etiology, notes reviewed thought to be related to a viral gastroenteritis versus a postinfectious IBS versus an acalculous cholecystitis.  Advancing diet as tolerated starting bowel regimen  Primary hypertension  Amlodipine 5 mg twice a day  Hydralazine 25 every 8 hours  Although blood pressure intermittently elevated I would hold off on adjusting given ZENAIDA potentially increase hydralazine  Of course avoid ACE inhibitor/ARB/diuretics  Hyponatremia  Resolved monitor for now  Type 2 diabetes mellitus with hyperglycemia, with long-term current use of insulin ([facility])  Per primary team   Alcohol abuse  Efforts with alcohol cessation.  Monitor for withdrawal  Paroxysmal A-fib ([facility])  Per primary team  Anemia  Hemoglobin 8.5  Check iron studies/stool for occult blood  Seizure ([facility])  No current  seizure  Alcohol-induced chronic pancreatitis (HCC)  Per primary team    I have reviewed the nephrology recommendations including continuing to monitor on albumin and octreotide, with SLIM, and we are in agreement with renal plan including the information outlined above.     Subjective   Brief History of Admission -we are seeing for acute on top of chronic kidney disease in the setting of possible viral gastroenteritis    Not a great historian denying all symptoms including chest pain shortness of breath  He denies any urinary symptoms  He denies any nausea vomiting or diarrhea    Objective :  Temp:  [97.6 °F (36.4 °C)-98.3 °F (36.8 °C)] 98.3 °F (36.8 °C)  HR:  [81-98] 98  BP: (137-180)/(74-93) 154/85  Resp:  [18-19] 18  SpO2:  [94 %-98 %] 98 %  O2 Device: None (Room air)    Current Weight: Weight - Scale: 66 kg (145 lb 8.1 oz)  First Weight: Weight - Scale: 66 kg (145 lb 8.1 oz)  I/O         04/28 0701  04/29 0700 04/29 0701  04/30 0700 04/30 0701  05/01 0700    P.O. 480 300     I.V. (mL/kg) 993.8 (15.1)      Total Intake(mL/kg) 1473.8 (22.3) 300 (4.5)     Urine (mL/kg/hr) 775 (0.5) 700 (0.4)     Stool       Total Output 775 700     Net +698.8 -400            Unmeasured Urine Occurrence 1 x 1 x           Physical Exam  Physical Exam: General:  No acute distress  Skin:  No acute rash  Eyes:  No scleral icterus and noninjected  ENT:  Moist mucous membranes  Neck:  Supple, no jugular venous distention, trachea midline, overall appearance is normal  Chest:  Clear to auscultation  CVS:  Regular rate and rhythm, without a rub or gallops  Abdomen:  Normal bowel sounds, soft and nontender and nondistended  Extremities:  No edema, and no cyanosis, no significant arthritic changes  Neuro:  No gross focality  Psych:  Alert and oriented and appropriate    Medications:    Current Facility-Administered Medications:     acetaminophen (TYLENOL) tablet 975 mg, 975 mg, Oral, Q8H PRN, Samy Bah PA-C, 975 mg at 04/29/25 0832     albumin human (FLEXBUMIN) 25 % injection 25 g, 25 g, Intravenous, Q8H, Reji Esparza MD, 25 g at 04/30/25 0849    aluminum-magnesium hydroxide-simethicone (MAALOX) oral suspension 30 mL, 30 mL, Oral, Q6H PRN, Samy Bah PA-C    amLODIPine (NORVASC) tablet 5 mg, 5 mg, Oral, BID, Samy Bah PA-C, 5 mg at 04/30/25 0849    folic acid (FOLVITE) tablet 1 mg, 1 mg, Oral, Daily, Mojgan Young MD, 1 mg at 04/30/25 0849    heparin (porcine) subcutaneous injection 5,000 Units, 5,000 Units, Subcutaneous, Q8H JAISON, Samy Bah PA-C, 5,000 Units at 04/30/25 0507    hydrALAZINE (APRESOLINE) tablet 25 mg, 25 mg, Oral, Q8H JAISON, Samy Bah PA-C, 25 mg at 04/30/25 0508    HYDROmorphone HCl (DILAUDID) injection 0.2 mg, 0.2 mg, Intravenous, Q6H PRN, Samy Bah PA-C, 0.2 mg at 04/28/25 0752    insulin aspart protamine-insulin aspart (NovoLOG 70/30) 100 units/mL subcutaneous injection 4 Units, 4 Units, Subcutaneous, BID AC, Samy Bah PA-C, 4 Units at 04/30/25 0745    insulin lispro (HumALOG/ADMELOG) 100 units/mL subcutaneous injection 1-5 Units, 1-5 Units, Subcutaneous, 4x Daily (AC & HS), 1 Units at 04/29/25 2206 **AND** Fingerstick Glucose (POCT), , , 4x Daily AC and at bedtime, Samy Bah PA-C    naloxone (NARCAN) 0.04 mg/mL syringe 0.04 mg, 0.04 mg, Intravenous, Q1MIN PRN, Samy Bah PA-C    octreotide (SandoSTATIN) injection 100 mcg, 100 mcg, Intravenous, Q8H JAISON, Fredi Mc, DO, 100 mcg at 04/30/25 0508    ondansetron (ZOFRAN) injection 4 mg, 4 mg, Intravenous, Q6H PRN, Samy Bah PA-C    oxyCODONE (ROXICODONE) IR tablet 5 mg, 5 mg, Oral, Q6H PRN, Samy Bah PA-C, 5 mg at 04/28/25 2119    oxyCODONE (ROXICODONE) split tablet 2.5 mg, 2.5 mg, Oral, Q6H PRN, Samy Bah PA-C, 2.5 mg at 04/27/25 1121    polyethylene glycol (MIRALAX) packet 17 g, 17 g, Oral, Daily, Reji Esparza MD, 17 g at 04/30/25 0849    thiamine tablet 100 mg, 100 mg, Oral, Daily, Mojgan Young MD, 100 mg at 04/30/25 0849      Lab Results: I have  "reviewed the following results:  Results from last 7 days   Lab Units 04/30/25  0508 04/29/25  0551 04/28/25  0459 04/27/25  0456 04/26/25  1615   WBC Thousand/uL 11.11* 10.32* 10.50* 11.44* 10.20*   HEMOGLOBIN g/dL 8.5* 8.6* 9.8* 9.6* 11.0*   HEMATOCRIT % 24.6* 24.3* 28.1* 27.4* 30.9*   PLATELETS Thousands/uL 255 238 243 242 235   POTASSIUM mmol/L 5.1 4.3 4.2 3.0* 3.7   CHLORIDE mmol/L 105 106 105 107 100   CO2 mmol/L 26 24 24 23 19*   BUN mg/dL 17 15 15 11 12   CREATININE mg/dL 2.09* 2.14* 1.92* 1.38* 1.52*   CALCIUM mg/dL 8.6 7.8* 7.9* 7.8* 8.3*   MAGNESIUM mg/dL 2.2 1.7*  --   --   --    ALBUMIN g/dL 2.9* 2.3* 2.4* 2.3* 2.8*       Administrative Statements     Portions of the record may have been created with voice recognition software. Occasional wrong word or \"sound a like\" substitutions may have occurred due to the inherent limitations of voice recognition software. Read the chart carefully and recognize, using context, where substitutions have occurred.If you have any questions, please contact the dictating provider.  "

## 2025-04-30 NOTE — ASSESSMENT & PLAN NOTE
Lab Results   Component Value Date    HGBA1C 9.8 (H) 02/05/2025     Recent Labs     04/29/25  1636 04/29/25  2056 04/30/25  0606 04/30/25  1110   POCGLU 86 161* 152* 275*   Blood Sugar Average: Last 72 hrs:  (P) 162.9885353879802914  outpatient regimen: NPH 4u BID  Patient presented with hyperglycemia secondary to missed medications  Was restarted on his home regimen, plus Accu-Cheks and sliding scale insulin with improvement

## 2025-05-01 ENCOUNTER — TRANSITIONAL CARE MANAGEMENT (OUTPATIENT)
Dept: FAMILY MEDICINE CLINIC | Facility: CLINIC | Age: 55
End: 2025-05-01

## 2025-05-01 ENCOUNTER — PATIENT OUTREACH (OUTPATIENT)
Dept: CASE MANAGEMENT | Facility: OTHER | Age: 55
End: 2025-05-01

## 2025-05-01 ENCOUNTER — TELEPHONE (OUTPATIENT)
Dept: NEPHROLOGY | Facility: CLINIC | Age: 55
End: 2025-05-01

## 2025-05-01 NOTE — TELEPHONE ENCOUNTER
----- Message from Sonja Siegel PA-C sent at 4/30/2025  4:19 PM EDT -----  Hi, patient discharged from [facility] today.  Please get him set up for office follow up in 1-2 weeks or as soon as possible after that.  I placed a bmp in system for 1 week out.  Thank you!

## 2025-05-01 NOTE — UTILIZATION REVIEW
NOTIFICATION OF ADMISSION DISCHARGE   This is a Notification of Discharge from [facility]. Please be advised that this patient has been discharge from our facility. Below you will find the admission and discharge date and time including the patient’s disposition.   UTILIZATION REVIEW CONTACT:  Utilization Review Assistants  Network Utilization Review Department  Phone: [phone] x carefully listen to the prompts. All voicemails are confidential.  Email: [email or web address]     ADMISSION INFORMATION  PRESENTATION DATE: 4/26/2025  3:56 PM  OBERVATION ADMISSION DATE: N/A  INPATIENT ADMISSION DATE: 4/26/25  9:24 PM   DISCHARGE DATE: 4/30/2025  4:11 PM   DISPOSITION:Left against medical advice or discontinued care    Network Utilization Review Department  ATTENTION: Please call with any questions or concerns to [phone] and carefully listen to the prompts so that you are directed to the right person. All voicemails are confidential.   For Discharge needs, contact Care Management DC Support Team at [phone] opt. 2  Send all requests for admission clinical reviews, approved or denied determinations and any other requests to dedicated fax number below belonging to the campus where the patient is receiving treatment. List of dedicated fax numbers for the Facilities:  FACILITY NAME UR FAX NUMBER   ADMISSION DENIALS (Administrative/Medical Necessity) [phone]   DISCHARGE SUPPORT TEAM ([facility] [place]) [phone]   PARENT CHILD HEALTH (Maternity/NICU/Pediatrics) [phone]   [facility] [phone]   [facility] [phone]   [facility] [phone]   [facility] [phone]   [facility] [phone]   [facility] [phone]   [facility] [phone]   [facility]  [place] [phone]   [facility] [phone]   [facility] [phone]   [facility] [phone]   [place] [phone]

## 2025-05-01 NOTE — TELEPHONE ENCOUNTER
Called patient and spoke with him but he needs a . I called back with Balaji # [id number] and we got the machine twice, she left a message to call us back to schedule in [place].

## 2025-05-05 ENCOUNTER — PATIENT OUTREACH (OUTPATIENT)
Dept: FAMILY MEDICINE CLINIC | Facility: CLINIC | Age: 55
End: 2025-05-05

## 2025-05-05 NOTE — PROGRESS NOTES
Outpatient RN Care Manager Note    RN CM received Care Transitions patient referral.  Patient was admitted from 4/26 to 4/30 due to abdominal pain. Chart and discharge instructions reviewed.  Patient did leave AMA.    Called patient using  # [phone].  Patient's father answered call and stated patient was sleeping.  Requested a return call later in the day. Will continue to outreach.    RN ZOILA placed second call to patient using  # [phone].  Patient's father answered the call and then patient got on phone briefly.  RN ZOILA introduced self and purpose for the care and patient states he was not feeling well and did not want to get out of bed.  Patient then handed phone to father to speak for him.  Father states the patient vomited yesterday but denies N/V today. Patient state he feels dizzy and may be his blood sugar.  RN CM inquired if patient is testing his blood sugar and father denied.  RN CM inquired of patient is taking 70/30 insulin 5 units two times a day with meals.  Father states patient took insulin once yesterday and has not taken today.  Father then stated the patient is an adult and he can not tell patient what to do.  RN CM provided encouragement to patient's father.  Father requested a return call to patient and RN CM advised she would attempt to speak with the patient another day.  RN CM advised father if patient continues to feel worse he should go to ED.    RN CM will continue to outreach.

## 2025-05-06 NOTE — TELEPHONE ENCOUNTER
I called Wes for the referral to schedule a hfu. His father answered and asked for a business card to be mailed bc patient is laying down and did not want to get up. Please mail a business card to patient.  They would also like a call back another day from the office to possibly schedule. There was not a communication consent so I was only able to let them know I was calling from [facility].    Used  [id number]

## 2025-05-07 ENCOUNTER — PATIENT OUTREACH (OUTPATIENT)
Dept: FAMILY MEDICINE CLINIC | Facility: CLINIC | Age: 55
End: 2025-05-07

## 2025-05-07 NOTE — PROGRESS NOTES
Outpatient RN Care Manager Note    RN CM received Care Transitions patient referral. Patient was admitted from 4/26 to 4/30 due to abdominal pain. Chart and discharge instructions reviewed. Patient did leave AMA.     RN ZOILA called patient using  # [phone].  Patient's father answered call, RN ZOILA introduced self and reason for call.  Patient's father stated patient declines coming to phone and declines patient outreach.  RN CM provided patient's father with contact information if needed.  Will close Care Transitions referral.

## 2025-05-23 ENCOUNTER — APPOINTMENT (EMERGENCY)
Dept: CT IMAGING | Facility: HOSPITAL | Age: 55
DRG: 280 | End: 2025-05-23
Payer: COMMERCIAL

## 2025-05-23 ENCOUNTER — HOSPITAL ENCOUNTER (INPATIENT)
Facility: HOSPITAL | Age: 55
LOS: 14 days | Discharge: HOME | DRG: 280 | End: 2025-06-06
Attending: EMERGENCY MEDICINE | Admitting: INTERNAL MEDICINE
Payer: COMMERCIAL

## 2025-05-23 ENCOUNTER — APPOINTMENT (INPATIENT)
Dept: ULTRASOUND IMAGING | Facility: HOSPITAL | Age: 55
DRG: 280 | End: 2025-05-23
Payer: COMMERCIAL

## 2025-05-23 DIAGNOSIS — F10.10 ALCOHOL ABUSE: ICD-10-CM

## 2025-05-23 DIAGNOSIS — Z75.8 SPANISH LANGUAGE INTERPRETER NEEDED: ICD-10-CM

## 2025-05-23 DIAGNOSIS — E11.10 DKA (DIABETIC KETOACIDOSIS) (HCC): ICD-10-CM

## 2025-05-23 DIAGNOSIS — G93.40 ACUTE ENCEPHALOPATHY: Primary | ICD-10-CM

## 2025-05-23 DIAGNOSIS — I10 UNCONTROLLED HYPERTENSION: ICD-10-CM

## 2025-05-23 DIAGNOSIS — I48.0 PAROXYSMAL A-FIB (HCC): ICD-10-CM

## 2025-05-23 DIAGNOSIS — N17.9 AKI (ACUTE KIDNEY INJURY) (HCC): ICD-10-CM

## 2025-05-23 DIAGNOSIS — E11.9 TYPE 2 DIABETES MELLITUS (HCC): ICD-10-CM

## 2025-05-23 DIAGNOSIS — F10.139 ALCOHOL ABUSE WITH WITHDRAWAL (HCC): ICD-10-CM

## 2025-05-23 DIAGNOSIS — K86.0 ALCOHOL-INDUCED CHRONIC PANCREATITIS (HCC): ICD-10-CM

## 2025-05-23 DIAGNOSIS — N17.9 ACUTE KIDNEY INJURY (HCC): ICD-10-CM

## 2025-05-23 DIAGNOSIS — K72.00 ACUTE LIVER FAILURE WITHOUT HEPATIC COMA: ICD-10-CM

## 2025-05-23 DIAGNOSIS — R74.01 TRANSAMINITIS: ICD-10-CM

## 2025-05-23 PROBLEM — G92.8 TOXIC METABOLIC ENCEPHALOPATHY: Status: ACTIVE | Noted: 2024-01-25

## 2025-05-23 LAB
2HR DELTA HS TROPONIN: 1 NG/L
ALBUMIN SERPL BCG-MCNC: 2.8 G/DL (ref 3.5–5)
ALP SERPL-CCNC: 1069 U/L (ref 34–104)
ALT SERPL W P-5'-P-CCNC: 51 U/L (ref 7–52)
AMMONIA PLAS-SCNC: 36 UMOL/L (ref 18–72)
AMPHETAMINES SERPL QL SCN: NEGATIVE
ANION GAP SERPL CALCULATED.3IONS-SCNC: 10 MMOL/L (ref 4–13)
ANION GAP SERPL CALCULATED.3IONS-SCNC: 8 MMOL/L (ref 4–13)
APAP SERPL-MCNC: <2 UG/ML (ref 10–20)
APTT PPP: 30 SECONDS (ref 23–34)
ARTERIAL PATENCY WRIST A: YES
AST SERPL W P-5'-P-CCNC: 48 U/L (ref 13–39)
B-OH-BUTYR SERPL-MCNC: 0.21 MMOL/L (ref 0.2–0.6)
BARBITURATES UR QL: NEGATIVE
BASE EX.OXY STD BLDV CALC-SCNC: 48.2 % (ref 60–80)
BASE EXCESS BLDA CALC-SCNC: -4.4 MMOL/L
BASE EXCESS BLDV CALC-SCNC: -5.3 MMOL/L
BASOPHILS # BLD AUTO: 0.04 THOUSANDS/ÂΜL (ref 0–0.1)
BASOPHILS NFR BLD AUTO: 0 % (ref 0–1)
BENZODIAZ UR QL: NEGATIVE
BILIRUB DIRECT SERPL-MCNC: 2.54 MG/DL (ref 0–0.2)
BILIRUB SERPL-MCNC: 4.18 MG/DL (ref 0.2–1)
BUN SERPL-MCNC: 12 MG/DL (ref 5–25)
BUN SERPL-MCNC: 13 MG/DL (ref 5–25)
CA-I BLD-SCNC: 1.13 MMOL/L (ref 1.12–1.32)
CALCIUM SERPL-MCNC: 8.2 MG/DL (ref 8.4–10.2)
CALCIUM SERPL-MCNC: 8.6 MG/DL (ref 8.4–10.2)
CARDIAC TROPONIN I PNL SERPL HS: 13 NG/L (ref ?–50)
CARDIAC TROPONIN I PNL SERPL HS: 14 NG/L (ref ?–50)
CHLORIDE SERPL-SCNC: 92 MMOL/L (ref 96–108)
CHLORIDE SERPL-SCNC: 97 MMOL/L (ref 96–108)
CK SERPL-CCNC: 62 U/L (ref 39–308)
CO2 SERPL-SCNC: 23 MMOL/L (ref 21–32)
CO2 SERPL-SCNC: 23 MMOL/L (ref 21–32)
COCAINE UR QL: NEGATIVE
CREAT SERPL-MCNC: 1.97 MG/DL (ref 0.6–1.3)
CREAT SERPL-MCNC: 1.99 MG/DL (ref 0.6–1.3)
EOSINOPHIL # BLD AUTO: 0.21 THOUSAND/ÂΜL (ref 0–0.61)
EOSINOPHIL NFR BLD AUTO: 2 % (ref 0–6)
ERYTHROCYTE [DISTWIDTH] IN BLOOD BY AUTOMATED COUNT: 12.9 % (ref 11.6–15.1)
ETHANOL SERPL-MCNC: <10 MG/DL
FENTANYL UR QL SCN: NEGATIVE
GFR SERPL CREATININE-BSD FRML MDRD: 36 ML/MIN/1.73SQ M
GFR SERPL CREATININE-BSD FRML MDRD: 37 ML/MIN/1.73SQ M
GLUCOSE SERPL-MCNC: 405 MG/DL (ref 65–140)
GLUCOSE SERPL-MCNC: 465 MG/DL (ref 65–140)
GLUCOSE SERPL-MCNC: 615 MG/DL (ref 65–140)
GLUCOSE SERPL-MCNC: 785 MG/DL (ref 65–140)
GLUCOSE SERPL-MCNC: >600 MG/DL (ref 65–140)
GLUCOSE SERPL-MCNC: >600 MG/DL (ref 65–140)
HCO3 BLDA-SCNC: 20.5 MMOL/L (ref 22–28)
HCO3 BLDV-SCNC: 22.4 MMOL/L (ref 24–30)
HCT VFR BLD AUTO: 28.9 % (ref 36.5–49.3)
HGB BLD-MCNC: 9.7 G/DL (ref 12–17)
HYDROCODONE UR QL SCN: NEGATIVE
IMM GRANULOCYTES # BLD AUTO: 0.09 THOUSAND/UL (ref 0–0.2)
IMM GRANULOCYTES NFR BLD AUTO: 1 % (ref 0–2)
INR PPP: 3.73 (ref 0.85–1.19)
LIPASE SERPL-CCNC: <6 U/L (ref 11–82)
LYMPHOCYTES # BLD AUTO: 0.95 THOUSANDS/ÂΜL (ref 0.6–4.47)
LYMPHOCYTES NFR BLD AUTO: 8 % (ref 14–44)
MAGNESIUM SERPL-MCNC: 1.9 MG/DL (ref 1.9–2.7)
MCH RBC QN AUTO: 30.8 PG (ref 26.8–34.3)
MCHC RBC AUTO-ENTMCNC: 33.6 G/DL (ref 31.4–37.4)
MCV RBC AUTO: 92 FL (ref 82–98)
METHADONE UR QL: NEGATIVE
MONOCYTES # BLD AUTO: 0.64 THOUSAND/ÂΜL (ref 0.17–1.22)
MONOCYTES NFR BLD AUTO: 6 % (ref 4–12)
NEUTROPHILS # BLD AUTO: 9.42 THOUSANDS/ÂΜL (ref 1.85–7.62)
NEUTS SEG NFR BLD AUTO: 83 % (ref 43–75)
NON VENT ROOM AIR: 21 %
NRBC BLD AUTO-RTO: 0 /100 WBCS
O2 CT BLDA-SCNC: 14.2 ML/DL (ref 16–23)
O2 CT BLDV-SCNC: 7.2 ML/DL
OPIATES UR QL SCN: NEGATIVE
OXYCODONE+OXYMORPHONE UR QL SCN: NEGATIVE
OXYHGB MFR BLDA: 97.1 % (ref 94–97)
PCO2 BLDA: 37.2 MM HG (ref 36–44)
PCO2 BLDV: 54.8 MM HG (ref 42–50)
PCP UR QL: NEGATIVE
PH BLDA: 7.36 [PH] (ref 7.35–7.45)
PH BLDV: 7.23 [PH] (ref 7.3–7.4)
PHOSPHATE SERPL-MCNC: 1.9 MG/DL (ref 2.7–4.5)
PLATELET # BLD AUTO: 220 THOUSANDS/UL (ref 149–390)
PMV BLD AUTO: 12.4 FL (ref 8.9–12.7)
PO2 BLDA: 107.5 MM HG (ref 75–129)
PO2 BLDV: 27.7 MM HG (ref 35–45)
POTASSIUM SERPL-SCNC: 3.4 MMOL/L (ref 3.5–5.3)
POTASSIUM SERPL-SCNC: 3.8 MMOL/L (ref 3.5–5.3)
PROCALCITONIN SERPL-MCNC: 1.09 NG/ML
PROT SERPL-MCNC: 6 G/DL (ref 6.4–8.4)
PROTHROMBIN TIME: 36.1 SECONDS (ref 12.3–15)
RBC # BLD AUTO: 3.15 MILLION/UL (ref 3.88–5.62)
SALICYLATES SERPL-MCNC: <5 MG/DL (ref 5–20)
SODIUM SERPL-SCNC: 125 MMOL/L (ref 135–147)
SODIUM SERPL-SCNC: 128 MMOL/L (ref 135–147)
SPECIMEN SOURCE: ABNORMAL
THC UR QL: NEGATIVE
WBC # BLD AUTO: 11.35 THOUSAND/UL (ref 4.31–10.16)

## 2025-05-23 PROCEDURE — 96372 THER/PROPH/DIAG INJ SC/IM: CPT

## 2025-05-23 PROCEDURE — 84484 ASSAY OF TROPONIN QUANT: CPT | Performed by: EMERGENCY MEDICINE

## 2025-05-23 PROCEDURE — 82077 ASSAY SPEC XCP UR&BREATH IA: CPT | Performed by: EMERGENCY MEDICINE

## 2025-05-23 PROCEDURE — 96375 TX/PRO/DX INJ NEW DRUG ADDON: CPT

## 2025-05-23 PROCEDURE — 80048 BASIC METABOLIC PNL TOTAL CA: CPT | Performed by: PHYSICIAN ASSISTANT

## 2025-05-23 PROCEDURE — 82330 ASSAY OF CALCIUM: CPT | Performed by: PHYSICIAN ASSISTANT

## 2025-05-23 PROCEDURE — 93005 ELECTROCARDIOGRAM TRACING: CPT

## 2025-05-23 PROCEDURE — 84145 PROCALCITONIN (PCT): CPT | Performed by: PHYSICIAN ASSISTANT

## 2025-05-23 PROCEDURE — 36600 WITHDRAWAL OF ARTERIAL BLOOD: CPT

## 2025-05-23 PROCEDURE — 80179 DRUG ASSAY SALICYLATE: CPT | Performed by: EMERGENCY MEDICINE

## 2025-05-23 PROCEDURE — 85730 THROMBOPLASTIN TIME PARTIAL: CPT | Performed by: EMERGENCY MEDICINE

## 2025-05-23 PROCEDURE — 96365 THER/PROPH/DIAG IV INF INIT: CPT

## 2025-05-23 PROCEDURE — 85025 COMPLETE CBC W/AUTO DIFF WBC: CPT | Performed by: EMERGENCY MEDICINE

## 2025-05-23 PROCEDURE — 82948 REAGENT STRIP/BLOOD GLUCOSE: CPT

## 2025-05-23 PROCEDURE — 80048 BASIC METABOLIC PNL TOTAL CA: CPT | Performed by: EMERGENCY MEDICINE

## 2025-05-23 PROCEDURE — 84100 ASSAY OF PHOSPHORUS: CPT | Performed by: PHYSICIAN ASSISTANT

## 2025-05-23 PROCEDURE — 99285 EMERGENCY DEPT VISIT HI MDM: CPT

## 2025-05-23 PROCEDURE — 96361 HYDRATE IV INFUSION ADD-ON: CPT

## 2025-05-23 PROCEDURE — 85610 PROTHROMBIN TIME: CPT | Performed by: EMERGENCY MEDICINE

## 2025-05-23 PROCEDURE — 36415 COLL VENOUS BLD VENIPUNCTURE: CPT | Performed by: EMERGENCY MEDICINE

## 2025-05-23 PROCEDURE — 96374 THER/PROPH/DIAG INJ IV PUSH: CPT

## 2025-05-23 PROCEDURE — 76705 ECHO EXAM OF ABDOMEN: CPT

## 2025-05-23 PROCEDURE — 82550 ASSAY OF CK (CPK): CPT | Performed by: EMERGENCY MEDICINE

## 2025-05-23 PROCEDURE — 99291 CRITICAL CARE FIRST HOUR: CPT | Performed by: PHYSICIAN ASSISTANT

## 2025-05-23 PROCEDURE — 82010 KETONE BODYS QUAN: CPT | Performed by: EMERGENCY MEDICINE

## 2025-05-23 PROCEDURE — 99291 CRITICAL CARE FIRST HOUR: CPT | Performed by: EMERGENCY MEDICINE

## 2025-05-23 PROCEDURE — 83735 ASSAY OF MAGNESIUM: CPT | Performed by: PHYSICIAN ASSISTANT

## 2025-05-23 PROCEDURE — 82140 ASSAY OF AMMONIA: CPT | Performed by: EMERGENCY MEDICINE

## 2025-05-23 PROCEDURE — 83690 ASSAY OF LIPASE: CPT | Performed by: EMERGENCY MEDICINE

## 2025-05-23 PROCEDURE — 80143 DRUG ASSAY ACETAMINOPHEN: CPT | Performed by: EMERGENCY MEDICINE

## 2025-05-23 PROCEDURE — 70450 CT HEAD/BRAIN W/O DYE: CPT

## 2025-05-23 PROCEDURE — 82805 BLOOD GASES W/O2 SATURATION: CPT | Performed by: EMERGENCY MEDICINE

## 2025-05-23 PROCEDURE — 80307 DRUG TEST PRSMV CHEM ANLYZR: CPT | Performed by: EMERGENCY MEDICINE

## 2025-05-23 PROCEDURE — 80076 HEPATIC FUNCTION PANEL: CPT | Performed by: EMERGENCY MEDICINE

## 2025-05-23 RX ORDER — LORAZEPAM 2 MG/ML
2 INJECTION INTRAMUSCULAR ONCE
Status: COMPLETED | OUTPATIENT
Start: 2025-05-23 | End: 2025-05-23

## 2025-05-23 RX ORDER — HALOPERIDOL 5 MG/ML
5 INJECTION INTRAMUSCULAR ONCE
Status: COMPLETED | OUTPATIENT
Start: 2025-05-23 | End: 2025-05-23

## 2025-05-23 RX ORDER — LABETALOL HYDROCHLORIDE 5 MG/ML
10 INJECTION, SOLUTION INTRAVENOUS ONCE
Status: COMPLETED | OUTPATIENT
Start: 2025-05-23 | End: 2025-05-23

## 2025-05-23 RX ORDER — LORAZEPAM 2 MG/ML
INJECTION INTRAMUSCULAR
Status: COMPLETED
Start: 2025-05-23 | End: 2025-05-23

## 2025-05-23 RX ORDER — AMLODIPINE BESYLATE 5 MG/1
5 TABLET ORAL 2 TIMES DAILY
Status: DISCONTINUED | OUTPATIENT
Start: 2025-05-23 | End: 2025-05-27

## 2025-05-23 RX ORDER — CHLORHEXIDINE GLUCONATE ORAL RINSE 1.2 MG/ML
15 SOLUTION DENTAL EVERY 12 HOURS SCHEDULED
Status: DISCONTINUED | OUTPATIENT
Start: 2025-05-23 | End: 2025-05-27

## 2025-05-23 RX ORDER — SODIUM CHLORIDE, SODIUM GLUCONATE, SODIUM ACETATE, POTASSIUM CHLORIDE, MAGNESIUM CHLORIDE, SODIUM PHOSPHATE, DIBASIC, AND POTASSIUM PHOSPHATE .53; .5; .37; .037; .03; .012; .00082 G/100ML; G/100ML; G/100ML; G/100ML; G/100ML; G/100ML; G/100ML
100 INJECTION, SOLUTION INTRAVENOUS CONTINUOUS
Status: DISCONTINUED | OUTPATIENT
Start: 2025-05-23 | End: 2025-05-25

## 2025-05-23 RX ORDER — POTASSIUM CHLORIDE 14.9 MG/ML
20 INJECTION INTRAVENOUS
Status: COMPLETED | OUTPATIENT
Start: 2025-05-23 | End: 2025-05-24

## 2025-05-23 RX ORDER — HYDRALAZINE HYDROCHLORIDE 20 MG/ML
10 INJECTION INTRAMUSCULAR; INTRAVENOUS EVERY 4 HOURS PRN
Status: DISCONTINUED | OUTPATIENT
Start: 2025-05-23 | End: 2025-06-06 | Stop reason: HOSPADM

## 2025-05-23 RX ADMIN — POTASSIUM PHOSPHATE, MONOBASIC POTASSIUM PHOSPHATE, DIBASIC 30 MMOL: 224; 236 INJECTION, SOLUTION, CONCENTRATE INTRAVENOUS at 23:22

## 2025-05-23 RX ADMIN — SODIUM CHLORIDE 6.6 UNITS/HR: 9 INJECTION, SOLUTION INTRAVENOUS at 20:19

## 2025-05-23 RX ADMIN — LORAZEPAM 2 MG: 2 INJECTION INTRAMUSCULAR; INTRAVENOUS at 18:04

## 2025-05-23 RX ADMIN — LORAZEPAM 2 MG: 2 INJECTION INTRAMUSCULAR; INTRAVENOUS at 19:21

## 2025-05-23 RX ADMIN — PHENOBARBITAL SODIUM 260 MG: 130 INJECTION INTRAMUSCULAR; INTRAVENOUS at 23:16

## 2025-05-23 RX ADMIN — CEFTRIAXONE 1000 MG: 10 INJECTION, POWDER, FOR SOLUTION INTRAVENOUS at 23:42

## 2025-05-23 RX ADMIN — HALOPERIDOL LACTATE 5 MG: 5 INJECTION, SOLUTION INTRAMUSCULAR at 18:29

## 2025-05-23 RX ADMIN — SODIUM CHLORIDE, SODIUM GLUCONATE, SODIUM ACETATE, POTASSIUM CHLORIDE, MAGNESIUM CHLORIDE, SODIUM PHOSPHATE, DIBASIC, AND POTASSIUM PHOSPHATE 250 ML/HR: .53; .5; .37; .037; .03; .012; .00082 INJECTION, SOLUTION INTRAVENOUS at 22:03

## 2025-05-23 RX ADMIN — LABETALOL HYDROCHLORIDE 10 MG: 5 INJECTION, SOLUTION INTRAVENOUS at 20:24

## 2025-05-23 RX ADMIN — HYDRALAZINE HYDROCHLORIDE 10 MG: 20 INJECTION, SOLUTION INTRAMUSCULAR; INTRAVENOUS at 22:21

## 2025-05-23 RX ADMIN — POTASSIUM CHLORIDE 20 MEQ: 14.9 INJECTION, SOLUTION INTRAVENOUS at 23:56

## 2025-05-23 RX ADMIN — LORAZEPAM 2 MG: 2 INJECTION INTRAMUSCULAR at 18:04

## 2025-05-23 RX ADMIN — SODIUM CHLORIDE 1000 ML: 0.9 INJECTION, SOLUTION INTRAVENOUS at 19:20

## 2025-05-23 RX ADMIN — CHLORHEXIDINE GLUCONATE 15 ML: 1.2 SOLUTION ORAL at 22:03

## 2025-05-23 NOTE — ED PROVIDER NOTES
Time reflects when diagnosis was documented in both MDM as applicable and the Disposition within this note       Time User Action Codes Description Comment    5/23/2025  8:37 PM [place] Darcy Add [G93.40] Acute encephalopathy     5/23/2025  8:37 PM [place], Darcy Add [E11.10] DKA (diabetic ketoacidosis) (HCC)     5/23/2025  8:37 PM [place] Darcy Add [I10] Uncontrolled hypertension     5/23/2025  8:37 PM Odette Darcy Add [F10.10] Alcohol abuse     5/23/2025  8:38 PM [place], Darcy Add [R74.01] Transaminitis     5/23/2025  9:37 PM Lauren Rivas Add [N17.9] ZENAIDA (acute kidney injury) ([facility])           ED Disposition       ED Disposition   Admit    Condition   Stable    Date/Time   Fri May 23, 2025  8:39 PM    Comment   Case was discussed with Critical Care and the patient's admission status was agreed to be Admission Status: inpatient status to the service of Dr. Payton .               Assessment & Plan       Medical Decision Making  A 54 yo male BIBA for AMS.  He is agitated and confused on arrival to the ED.  Glucose > 600.  Differential remains broad including but not limited to DKA/HHNK, alcohol intoxication, alcohol withdrawal, seizure now post ictal and hepatic encephalopathy.  Will proceed with labs and CTH as pt was found down per EMS.  Will give 2 mg Ativan now for agitation.    Amount and/or Complexity of Data Reviewed  Labs: ordered. Decision-making details documented in ED Course.  Radiology: ordered.    Risk  Prescription drug management.  Decision regarding hospitalization.    Critical Care Time Statement: Upon my evaluation, this patient had a high probability of imminent or life-threatening deterioration due to altered mental status, hyperglycemia, which required my direct attention, intervention, and personal management.  I spent a total of 45 minutes directly providing critical care services, including evaluating for the presence of life-threatening injuries or illnesses, management of organ  system failure(s) , complex medical decision making (to support/prevent further life-threatening deterioration)., and titration of continuous IV medications (drips). This time is exclusive of procedures, teaching, treating other patients, family meetings, and any prior time recorded by providers other than myself.        ED Course as of 05/24/25 0022   Fri May 23, 2025   1825 Notified by RN that IV was not in place and pt did not receive initial dose of ativan.  Will give IM haldol now.   1920 POCT INR(!): 3.73  Likely secondary to liver disease   1921 ETHANOL: <10   1938 Ammonia: 36  WNL   1938 pH, Sami(!): 7.230  Mixed metabolic and respiratory acidosis.     1938 Pt resting more comfortably following second dose of ativan   1949 GLUCOSE(!!): 785  Normal anion gap, will start IV insulin   1949 Sodium(!): 125  Corrected for hyperglycemia, 136   1951 Hepatic function panel(!)  Consistent with worsening liver function   2000 Pt now resting comfortably, persistently hypertensive.  Will give dose of IV antihypertensives   2013 Suspect AMS may be multifactorial secondary to hyperglycemia, hypertension and possibly alcohol withdrawal.  Critical care to evaluate.   2021 Critical care at bedside - requesting UDS and ABG   2036 Pt accepted by critical care       Medications   insulin regular (HumuLIN R,NovoLIN R) 1 Units/mL in sodium chloride 0.9 % 100 mL infusion (6.6 Units/hr Intravenous New Bag 5/23/25 2019)   LORazepam (ATIVAN) injection 2 mg (2 mg Intravenous Given 5/23/25 1804)   sodium chloride 0.9 % bolus 1,000 mL (0 mL Intravenous Stopped 5/23/25 2100)   haloperidol lactate (HALDOL) injection 5 mg (5 mg Intramuscular Given 5/23/25 1829)   LORazepam (ATIVAN) injection 2 mg (2 mg Intravenous Given 5/23/25 1921)   labetalol (NORMODYNE) injection 10 mg (10 mg Intravenous Given 5/23/25 2024)       ED Risk Strat Scores                    No data recorded                            History of Present Illness       Chief  Complaint   Patient presents with    Hyperglycemia - Symptomatic     Patient arrives via EMS after being found down with AMS. Patient shouting during triage.        Past Medical History[1]   Past Surgical History[2]   Family History[3]   Social History[4]   E-Cigarette/Vaping    E-Cigarette Use Never User       E-Cigarette/Vaping Substances    Nicotine No     THC No     CBD No     Flavoring No     Other No     Unknown No       I have reviewed and agree with the history as documented.     A 54 yo male with pmhx of alcohol abuse, DM, paroxysmal afib, CKD and HTN; BIBA for altered mental status.  Per EMS, pt was found altered at a store.  EMS reports glucose > 600.  On arrival to the ED, pt is awake although agitated.  He is unable to provide any meaningful history or ROS in both English and Setswana due to his mental status change.      History provided by:  Medical records and EMS personnel  History limited by:  Mental status change   used: Yes (RN at bedside)        Review of Systems   Unable to perform ROS: Mental status change       Objective       ED Triage Vitals   Temperature Pulse Blood Pressure Respirations SpO2 Patient Position - Orthostatic VS   05/23/25 1945 05/23/25 1850 05/23/25 1930 05/23/25 1850 05/23/25 1850 05/23/25 1930   97.9 °F (36.6 °C) 105 (!) 205/102 (!) 24 97 % Lying      Temp Source Heart Rate Source BP Location FiO2 (%) Pain Score    05/23/25 1945 05/23/25 1850 05/23/25 1930 -- --    Axillary Monitor Left arm        Vitals      Date and Time Temp Pulse SpO2 Resp BP Pain Score FACES Pain Rating User   05/24/25 0000 -- 90 98 % 18 138/65 -- -- JOSEPHINE   05/23/25 2345 96.9 °F (36.1 °C) -- -- -- -- -- -- IJ   05/23/25 2345 -- 92 99 % 19 -- -- -- JOSEPHINE   05/23/25 2330 -- 96 100 % 22 174/80 -- -- JOSEPHINE   05/23/25 2315 -- 92 99 % 19 153/70 -- -- JOSEPHINE   05/23/25 2300 -- 96 99 % 26 190/85 -- -- JOSEPHINE   05/23/25 2245 -- 96 99 % 17 178/90 -- -- JOSEPHINE   05/23/25 2230 -- 94 99 % -- 191/87 -- -- JOSEPHINE    05/23/25 2215 -- 92 98 % 33 -- -- -- JOSEPHINE   05/23/25 2200 -- 88 98 % 16 204/101 -- -- JOSEPHINE   05/23/25 2145 -- 87 98 % 19 222/105 -- -- JOSEPHINE   05/23/25 2100 -- 87 98 % 15 186/93 -- -- [facility]   05/23/25 2030 -- 86 98 % 14 193/90 -- -- [facility]   05/23/25 1945 97.9 °F (36.6 °C) 98 98 % 19 200/111 -- -- LAP   05/23/25 1930 -- 97 98 % 15 205/102 -- -- LAP   05/23/25 1850 -- 105 97 % 24 -- -- -- EK            Physical Exam  General Appearance: awake and alert, agitated.  Yelling in Indonesian, incomprehensible  Skin:  Warm, dry, intact.  No cyanosis  HEENT: Atraumatic, normocephalic.  No eye drainage.  Normal hearing.  Dry mucous membranes.    Neck: Supple, trachea midline  Cardiac: RRR; no murmurs, rub, gallops.  No pedal edema, 2+ pulses  Pulmonary: lungs CTAB; no wheezes, rales, rhonchi  Gastrointestinal: abdomen soft, nontender, nondistended; no guarding or rebound tenderness; good bowel sounds, no mass or bruits  Extremities:  No deformities.  No calf tenderness, no clubbing  Neuro:  no focal motor or sensory deficits, CN 2-12 grossly intact  Psych:  unable to assess      Results Reviewed       Procedure Component Value Units Date/Time    HS Troponin I 2hr [[id number]]  (Normal) Collected: 05/23/25 2155    Lab Status: Final result Specimen: Blood from Arm, Left Updated: 05/23/25 2228     hs TnI 2hr 14 ng/L      Delta 2hr hsTnI 1 ng/L     Rapid drug screen, urine [[id number]]  (Normal) Collected: 05/23/25 2157    Lab Status: Final result Specimen: Urine, Catheter Updated: 05/23/25 2227     Amph/Meth UR Negative     Barbiturate Ur Negative     Benzodiazepine Urine Negative     Cocaine Urine Negative     Methadone Urine Negative     Opiate Urine Negative     PCP Ur Negative     THC Urine Negative     Oxycodone Urine Negative     Fentanyl Urine Negative     HYDROCODONE URINE Negative    Narrative:      FOR MEDICAL PURPOSES ONLY.   IF CONFIRMATION NEEDED PLEASE CONTACT THE LAB WITHIN 5 DAYS.    Drug Screen Cutoff  Levels:  AMPHETAMINE/METHAMPHETAMINES  1000 ng/mL  BARBITURATES     200 ng/mL  BENZODIAZEPINES     200 ng/mL  COCAINE      300 ng/mL  METHADONE      300 ng/mL  OPIATES      300 ng/mL  PHENCYCLIDINE     25 ng/mL  THC       50 ng/mL  OXYCODONE      100 ng/mL  FENTANYL      5 ng/mL  HYDROCODONE     300 ng/mL    Lipase [[id number]]  (Abnormal) Collected: 05/23/25 1855    Lab Status: Final result Specimen: Blood from Arm, Left Updated: 05/23/25 2212     Lipase <6 u/L     Blood gas, arterial [[id number]]  (Abnormal) Collected: 05/23/25 2058    Lab Status: Final result Specimen: Blood, Arterial from Brachial, Right Updated: 05/23/25 2106     pH, Arterial 7.360     pCO2, Arterial 37.2 mm Hg      pO2, Arterial 107.5 mm Hg      HCO3, Arterial 20.5 mmol/L      Base Excess, Arterial -4.4 mmol/L      O2 Content, Arterial 14.2 mL/dL      O2 HGB,Arterial  97.1 %      SOURCE Brachial, Right     JEZ TEST Yes     ROOM AIR FIO2 21 %     HS Troponin I 4hr [[id number]]     Lab Status: No result Specimen: Blood     Basic metabolic panel [[id number]]  (Abnormal) Collected: 05/23/25 1855    Lab Status: Final result Specimen: Blood from Arm, Left Updated: 05/23/25 1944     Sodium 125 mmol/L      Potassium 3.8 mmol/L      Chloride 92 mmol/L      CO2 23 mmol/L      ANION GAP 10 mmol/L      BUN 12 mg/dL      Creatinine 1.99 mg/dL      Glucose 785 mg/dL      Calcium 8.6 mg/dL      eGFR 36 ml/min/1.73sq m     Narrative:      National Kid[employer] guidelines for Chronic Kidney Disease (CKD):     Stage 1 with normal or high GFR (GFR > 90 mL/min/1.73 square meters)    Stage 2 Mild CKD (GFR = 60-89 mL/min/1.73 square meters)    Stage 3A Moderate CKD (GFR = 45-59 mL/min/1.73 square meters)    Stage 3B Moderate CKD (GFR = 30-44 mL/min/1.73 square meters)    Stage 4 Severe CKD (GFR = 15-29 mL/min/1.73 square meters)    Stage 5 End Stage CKD (GFR <15 mL/min/1.73 square meters)  Note: GFR calculation is accurate only with a steady state  creatinine    Hepatic function panel [[id number]]  (Abnormal) Collected: 05/23/25 1855    Lab Status: Final result Specimen: Blood from Arm, Left Updated: 05/23/25 1940     Total Bilirubin 4.18 mg/dL      Bilirubin, Direct 2.54 mg/dL      Alkaline Phosphatase 1,069 U/L      AST 48 U/L      ALT 51 U/L      Total Protein 6.0 g/dL      Albumin 2.8 g/dL     Beta Hydroxybutyrate [[id number]]  (Normal) Collected: 05/23/25 1855    Lab Status: Final result Specimen: Blood from Arm, Left Updated: 05/23/25 1940     Beta- Hydroxybutyrate 0.21 mmol/L     CK [[id number]]  (Normal) Collected: 05/23/25 1855    Lab Status: Final result Specimen: Blood from Arm, Left Updated: 05/23/25 1940     Total CK 62 U/L     Salicylate level [[id number]]  (Abnormal) Collected: 05/23/25 1855    Lab Status: Final result Specimen: Blood from Arm, Left Updated: 05/23/25 1940     Salicylate Lvl <5 mg/dL     Acetaminophen level-If concentration is detectable, please discuss with medical  on call. [[id number]]  (Abnormal) Collected: 05/23/25 1855    Lab Status: Final result Specimen: Blood from Arm, Left Updated: 05/23/25 1940     Acetaminophen Level <2 ug/mL     Blood gas, venous [[id number]]  (Abnormal) Collected: 05/23/25 1929    Lab Status: Final result Specimen: Blood from Arm, Left Updated: 05/23/25 1937     pH, Sami 7.230     pCO2, Sami 54.8 mm Hg      pO2, Sami 27.7 mm Hg      HCO3, Sami 22.4 mmol/L      Base Excess, Sami -5.3 mmol/L      O2 Content, Sami 7.2 ml/dL      O2 HGB, VENOUS 48.2 %     Ammonia [[id number]]  (Normal) Collected: 05/23/25 1855    Lab Status: Final result Specimen: Blood from Arm, Left Updated: 05/23/25 1935     Ammonia 36 umol/L     HS Troponin 0hr (reflex protocol) [[id number]]  (Normal) Collected: 05/23/25 1855    Lab Status: Final result Specimen: Blood from Arm, Left Updated: 05/23/25 1924     hs TnI 0hr 13 ng/L     Protime-INR [[id number]]  (Abnormal) Collected: 05/23/25 1855    Lab Status: Final result Specimen:  Blood from Arm, Left Updated: 05/23/25 1920     Protime 36.1 seconds      INR 3.73    Narrative:      INR Therapeutic Range    Indication                                             INR Range      Atrial Fibrillation                                               2.0-3.0  Hypercoagulable State                                    2.0.2.3  Left Ventricular Asist Device                            2.0-3.0  Mechanical Heart Valve                                  -    Aortic(with afib, MI, embolism, HF, LA enlargement,    and/or coagulopathy)                                     2.0-3.0 (2.5-3.5)     Mitral                                                             2.5-3.5  Prosthetic/Bioprosthetic Heart Valve               2.0-3.0  Venous thromboembolism (VTE: VT, PE        2.0-3.0    APTT [[id number]]  (Normal) Collected: 05/23/25 1855    Lab Status: Final result Specimen: Blood from Arm, Left Updated: 05/23/25 1920     PTT 30 seconds     Ethanol [[id number]]  (Normal) Collected: 05/23/25 1855    Lab Status: Final result Specimen: Blood from Arm, Left Updated: 05/23/25 1916     Ethanol Lvl <10 mg/dL     CBC and differential [[id number]]  (Abnormal) Collected: 05/23/25 1855    Lab Status: Final result Specimen: Blood from Arm, Left Updated: 05/23/25 1902     WBC 11.35 Thousand/uL      RBC 3.15 Million/uL      Hemoglobin 9.7 g/dL      Hematocrit 28.9 %      MCV 92 fL      MCH 30.8 pg      MCHC 33.6 g/dL      RDW 12.9 %      MPV 12.4 fL      Platelets 220 Thousands/uL      nRBC 0 /100 WBCs      Segmented % 83 %      Immature Grans % 1 %      Lymphocytes % 8 %      Monocytes % 6 %      Eosinophils Relative 2 %      Basophils Relative 0 %      Absolute Neutrophils 9.42 Thousands/µL      Absolute Immature Grans 0.09 Thousand/uL      Absolute Lymphocytes 0.95 Thousands/µL      Absolute Monocytes 0.64 Thousand/µL      Eosinophils Absolute 0.21 Thousand/µL      Basophils Absolute 0.04 Thousands/µL     Fingerstick Glucose (POCT)  "[[id number]]  (Abnormal) Collected: 05/23/25 1803    Lab Status: Final result Specimen: Blood Updated: 05/23/25 1812     POC Glucose >600 mg/dl             US right upper quadrant   Final Interpretation by Solomon Spears DO (05/23 2314)      Limited exam.      Heterogeneous appearance of the pancreas, correlates with fatty replacement and calcifications seen on prior CT, possibly sequela of chronic inflammation/chronic pancreatitis.      Liver appears grossly unremarkable.      Small volume hypoechoic ascites, and other findings as above.      Workstation performed: [id number]         CT head without contrast   Final Interpretation by Eleuterio Campa MD (05/23 2103)      No acute intracranial abnormality.                  Workstation performed: [id number]             Procedures  ECG 12 Lead Documentation  Date/Time: today/date: 5/24/2025  Performed by: Darcy Pino    ECG reviewed by me, the ED Provider: yes    Patient location:  ED   Previous ECG:  Compared to current, no change   Rate:  105  ECG rate assessment: normal    Rhythm: sinus tachycardia    Ectopy:  none    QRS axis:  Normal  Intervals: normal   Q waves: None   ST segments:  Normal  T waves: normal      Impression: Sinus tachycardia, otherwise normal EKG       ED Medication and Procedure Management   Prior to Admission Medications   Prescriptions Last Dose Informant Patient Reported? Taking?   Alcohol Swabs 70 % PADS   No No   Sig: May substitute brand based on insurance coverage. Check glucose TID.   Blood Glucose Monitoring Suppl (OneTouch Verio Reflect) w/Device KIT   No No   Sig: May substitute brand based on insurance coverage. Check glucose TID.   Insulin Syringe-Needle U-100 (BD Insulin Syringe U/F) 31G X 5/16\" 0.3 ML MISC   No No   Sig: For use with insulin. Pharmacy may dispense brand covered by insurance.   Multiple Vitamin (multivitamin) tablet   No No   Sig: Take 1 tablet by mouth daily   OneTouch Delica Lancets 33G MISC   No No "   Sig: May substitute brand based on insurance coverage. Check glucose TID.   amLODIPine (NORVASC) 5 mg tablet   No No   Sig: Take 1 tablet (5 mg total) by mouth 2 (two) times a day   folic acid ( Folic Acid) 1 mg tablet   No No   Sig: Take 1 tablet (1 mg total) by mouth daily   glucose blood (OneTouch Verio) test strip   No No   Sig: May substitute brand based on insurance coverage. Check glucose TID.   hydrALAZINE (APRESOLINE) 25 mg tablet   No No   Sig: Take 1 tablet (25 mg total) by mouth every 8 (eight) hours   insulin NPH-insulin regular (HumuLIN 70/30) 100 units/mL subcutaneous injection   No No   Sig: Inject 5 Units under the skin 2 (two) times a day before meals Do not give yourself insulin if you are not going to eat.   pancrelipase, Lip-Prot-Amyl, (CREON) 24,000 units   No No   Sig: Take 24,000 units of lipase by mouth 3 (three) times a day with meals   thiamine 50 MG tablet   No No   Sig: Take 1 tablet (50 mg total) by mouth daily      Facility-Administered Medications: None     Current Discharge Medication List        CONTINUE these medications which have NOT CHANGED    Details   Alcohol Swabs 70 % PADS May substitute brand based on insurance coverage. Check glucose TID.  Qty: 100 each, Refills: 0    Associated Diagnoses: Type 2 diabetes mellitus with hyperosmolarity without coma, without long-term current use of insulin (HCC)      amLODIPine (NORVASC) 5 mg tablet Take 1 tablet (5 mg total) by mouth 2 (two) times a day  Qty: 120 tablet, Refills: 0    Associated Diagnoses: Essential hypertension      Blood Glucose Monitoring Suppl (OneTouch Verio Reflect) w/Device KIT May substitute brand based on insurance coverage. Check glucose TID.  Qty: 1 kit, Refills: 0    Associated Diagnoses: Type 2 diabetes mellitus with hyperosmolarity without coma, without long-term current use of insulin ([facility])      folic acid ( Folic Acid) 1 mg tablet Take 1 tablet (1 mg total) by mouth daily  Qty: 30 tablet, Refills: 0  "   Associated Diagnoses: Alcohol abuse with withdrawal ([facility])      glucose blood (OneTouch Verio) test strip May substitute brand based on insurance coverage. Check glucose TID.  Qty: 100 each, Refills: 0    Associated Diagnoses: Type 2 diabetes mellitus with hyperosmolarity without coma, without long-term current use of insulin ([facility])      hydrALAZINE (APRESOLINE) 25 mg tablet Take 1 tablet (25 mg total) by mouth every 8 (eight) hours  Qty: 180 tablet, Refills: 0    Associated Diagnoses: Essential hypertension      insulin NPH-insulin regular (HumuLIN 70/30) 100 units/mL subcutaneous injection Inject 5 Units under the skin 2 (two) times a day before meals Do not give yourself insulin if you are not going to eat.  Qty: 10 mL, Refills: 1    Associated Diagnoses: Type 2 diabetes mellitus ([facility])      Insulin Syringe-Needle U-100 (BD Insulin Syringe U/F) 31G X 5/16\" 0.3 ML MISC For use with insulin. Pharmacy may dispense brand covered by insurance.  Qty: 100 each, Refills: 0    Associated Diagnoses: Type 2 diabetes mellitus ([facility])      Multiple Vitamin (multivitamin) tablet Take 1 tablet by mouth daily  Qty: 30 tablet, Refills: 0    Associated Diagnoses: Type 2 diabetes mellitus with hyperosmolarity without coma, without long-term current use of insulin ([facility])      OneTouch Delica Lancets 33G MISC May substitute brand based on insurance coverage. Check glucose TID.  Qty: 100 each, Refills: 0    Associated Diagnoses: Type 2 diabetes mellitus with hyperosmolarity without coma, without long-term current use of insulin ([facility])      pancrelipase, Lip-Prot-Amyl, (CREON) 24,000 units Take 24,000 units of lipase by mouth 3 (three) times a day with meals  Qty: 90 capsule, Refills: 0    Associated Diagnoses: Type 2 diabetes mellitus with hyperglycemia, without long-term current use of insulin ([facility])      thiamine 50 MG tablet Take 1 tablet (50 mg total) by mouth daily  Qty: 30 tablet, Refills: 0    Associated Diagnoses: Alcohol abuse " with withdrawal (HCC)           No discharge procedures on file.  ED SEPSIS DOCUMENTATION   Time reflects when diagnosis was documented in both MDM as applicable and the Disposition within this note       Time User Action Codes Description Comment    5/23/2025  8:37 PM Darcy Pino Add [G93.40] Acute encephalopathy     5/23/2025  8:37 PM Fatimah Pinosa Add [E11.10] DKA (diabetic ketoacidosis) (HCC)     5/23/2025  8:37 PM Darcy Pino Add [I10] Uncontrolled hypertension     5/23/2025  8:37 PM Fatimah Pinosa Add [F10.10] Alcohol abuse     5/23/2025  8:38 PM Darcy Pino Add [R74.01] Transaminitis     5/23/2025  9:37 PM Lauren Rivas Add [N17.9] ZENAIDA (acute kidney injury) (HCC)                      [1]   Past Medical History:  Diagnosis Date    Alcohol abuse     Chronic pancreatitis (HCC)     Diabetes mellitus (HCC)     Type 2    Pancreatitis     Paroxysmal A-fib (HCC)     Thrombocytopenia (HCC)    [2]   Past Surgical History:  Procedure Laterality Date    INCISION AND DRAINAGE OF WOUND N/A 8/16/2020    Procedure: INCISION AND DRAINAGE (I&D) HEAD/FACE;  Surgeon: Estrada Walton DMD;  Location: [facility];  Service: Maxillofacial    IR BIOPSY BONE MARROW  2/7/2019    REMOVAL OF IMPACTED TOOTH - COMPLETELY BONY N/A 8/16/2020    Procedure: EXTRACTION TEETH MULTIPLE #2, 3;  Surgeon: Estrada Walton DMD;  Location: [facility];  Service: Maxillofacial   [3]   Family History  Problem Relation Name Age of Onset    Diabetes Mother      Hypertension Mother      Hyperlipidemia Father     [4]   Social History  Tobacco Use    Smoking status: Former     Passive exposure: Past    Smokeless tobacco: Never   Vaping Use    Vaping status: Never Used   Substance Use Topics    Alcohol use: Yes    Drug use: Not Currently     Types: Cocaine        Darcy Pino DO  05/24/25 0022

## 2025-05-24 PROBLEM — E87.8 ELECTROLYTE ABNORMALITY: Status: ACTIVE | Noted: 2025-05-24

## 2025-05-24 LAB
4HR DELTA HS TROPONIN: 3 NG/L
ALBUMIN SERPL BCG-MCNC: 2.8 G/DL (ref 3.5–5)
ALP SERPL-CCNC: 861 U/L (ref 34–104)
ALT SERPL W P-5'-P-CCNC: 42 U/L (ref 7–52)
ANION GAP SERPL CALCULATED.3IONS-SCNC: 12 MMOL/L (ref 4–13)
ANION GAP SERPL CALCULATED.3IONS-SCNC: 9 MMOL/L (ref 4–13)
ANION GAP SERPL CALCULATED.3IONS-SCNC: 9 MMOL/L (ref 4–13)
APTT PPP: 39 SECONDS (ref 23–34)
AST SERPL W P-5'-P-CCNC: 43 U/L (ref 13–39)
ATRIAL RATE: 105 BPM
BASOPHILS # BLD AUTO: 0.07 THOUSANDS/ÂΜL (ref 0–0.1)
BASOPHILS NFR BLD AUTO: 0 % (ref 0–1)
BILIRUB SERPL-MCNC: 2.93 MG/DL (ref 0.2–1)
BUN SERPL-MCNC: 12 MG/DL (ref 5–25)
CALCIUM ALBUM COR SERPL-MCNC: 9.5 MG/DL (ref 8.3–10.1)
CALCIUM SERPL-MCNC: 8.4 MG/DL (ref 8.4–10.2)
CALCIUM SERPL-MCNC: 8.5 MG/DL (ref 8.4–10.2)
CALCIUM SERPL-MCNC: 8.6 MG/DL (ref 8.4–10.2)
CARDIAC TROPONIN I PNL SERPL HS: 16 NG/L (ref ?–50)
CHLORIDE SERPL-SCNC: 101 MMOL/L (ref 96–108)
CHLORIDE SERPL-SCNC: 102 MMOL/L (ref 96–108)
CHLORIDE SERPL-SCNC: 104 MMOL/L (ref 96–108)
CO2 SERPL-SCNC: 20 MMOL/L (ref 21–32)
CO2 SERPL-SCNC: 22 MMOL/L (ref 21–32)
CO2 SERPL-SCNC: 22 MMOL/L (ref 21–32)
CREAT SERPL-MCNC: 1.72 MG/DL (ref 0.6–1.3)
CREAT SERPL-MCNC: 1.72 MG/DL (ref 0.6–1.3)
CREAT SERPL-MCNC: 1.78 MG/DL (ref 0.6–1.3)
EOSINOPHIL # BLD AUTO: 0.18 THOUSAND/ÂΜL (ref 0–0.61)
EOSINOPHIL NFR BLD AUTO: 1 % (ref 0–6)
ERYTHROCYTE [DISTWIDTH] IN BLOOD BY AUTOMATED COUNT: 12.6 % (ref 11.6–15.1)
FERRITIN SERPL-MCNC: 683 NG/ML (ref 30–336)
GFR SERPL CREATININE-BSD FRML MDRD: 42 ML/MIN/1.73SQ M
GFR SERPL CREATININE-BSD FRML MDRD: 43 ML/MIN/1.73SQ M
GFR SERPL CREATININE-BSD FRML MDRD: 43 ML/MIN/1.73SQ M
GGT SERPL-CCNC: 1128 U/L (ref 9–64)
GLUCOSE SERPL-MCNC: 105 MG/DL (ref 65–140)
GLUCOSE SERPL-MCNC: 110 MG/DL (ref 65–140)
GLUCOSE SERPL-MCNC: 113 MG/DL (ref 65–140)
GLUCOSE SERPL-MCNC: 147 MG/DL (ref 65–140)
GLUCOSE SERPL-MCNC: 148 MG/DL (ref 65–140)
GLUCOSE SERPL-MCNC: 166 MG/DL (ref 65–140)
GLUCOSE SERPL-MCNC: 183 MG/DL (ref 65–140)
GLUCOSE SERPL-MCNC: 188 MG/DL (ref 65–140)
GLUCOSE SERPL-MCNC: 188 MG/DL (ref 65–140)
GLUCOSE SERPL-MCNC: 192 MG/DL (ref 65–140)
GLUCOSE SERPL-MCNC: 237 MG/DL (ref 65–140)
GLUCOSE SERPL-MCNC: 289 MG/DL (ref 65–140)
GLUCOSE SERPL-MCNC: 317 MG/DL (ref 65–140)
GLUCOSE SERPL-MCNC: 41 MG/DL (ref 65–140)
GLUCOSE SERPL-MCNC: 72 MG/DL (ref 65–140)
HCT VFR BLD AUTO: 25.5 % (ref 36.5–49.3)
HGB BLD-MCNC: 8.6 G/DL (ref 12–17)
IGA SERPL-MCNC: 416 MG/DL (ref 66–433)
IGG SERPL-MCNC: 867 MG/DL (ref 635–1741)
IGM SERPL-MCNC: 292 MG/DL (ref 45–281)
IMM GRANULOCYTES # BLD AUTO: 0.12 THOUSAND/UL (ref 0–0.2)
IMM GRANULOCYTES NFR BLD AUTO: 1 % (ref 0–2)
INR PPP: 4.5 (ref 0.85–1.19)
IRON SATN MFR SERPL: 7 % (ref 15–50)
IRON SERPL-MCNC: 10 UG/DL (ref 50–212)
LYMPHOCYTES # BLD AUTO: 2.45 THOUSANDS/ÂΜL (ref 0.6–4.47)
LYMPHOCYTES NFR BLD AUTO: 12 % (ref 14–44)
MAGNESIUM SERPL-MCNC: 1.8 MG/DL (ref 1.9–2.7)
MAGNESIUM SERPL-MCNC: 1.8 MG/DL (ref 1.9–2.7)
MCH RBC QN AUTO: 30.3 PG (ref 26.8–34.3)
MCHC RBC AUTO-ENTMCNC: 33.7 G/DL (ref 31.4–37.4)
MCV RBC AUTO: 90 FL (ref 82–98)
MONOCYTES # BLD AUTO: 1.55 THOUSAND/ÂΜL (ref 0.17–1.22)
MONOCYTES NFR BLD AUTO: 8 % (ref 4–12)
NEUTROPHILS # BLD AUTO: 16.11 THOUSANDS/ÂΜL (ref 1.85–7.62)
NEUTS SEG NFR BLD AUTO: 78 % (ref 43–75)
NRBC BLD AUTO-RTO: 0 /100 WBCS
P AXIS: 77 DEGREES
PHOSPHATE SERPL-MCNC: 2.5 MG/DL (ref 2.7–4.5)
PHOSPHATE SERPL-MCNC: 9.3 MG/DL (ref 2.7–4.5)
PLATELET # BLD AUTO: 278 THOUSANDS/UL (ref 149–390)
PMV BLD AUTO: 11.7 FL (ref 8.9–12.7)
POTASSIUM SERPL-SCNC: 3.8 MMOL/L (ref 3.5–5.3)
POTASSIUM SERPL-SCNC: 4.9 MMOL/L (ref 3.5–5.3)
POTASSIUM SERPL-SCNC: 6.6 MMOL/L (ref 3.5–5.3)
PR INTERVAL: 132 MS
PROCALCITONIN SERPL-MCNC: 1.03 NG/ML
PROT SERPL-MCNC: 6 G/DL (ref 6.4–8.4)
PROTHROMBIN TIME: 41.5 SECONDS (ref 12.3–15)
QRS AXIS: 25 DEGREES
QRSD INTERVAL: 92 MS
QT INTERVAL: 356 MS
QTC INTERVAL: 470 MS
RBC # BLD AUTO: 2.84 MILLION/UL (ref 3.88–5.62)
SODIUM SERPL-SCNC: 132 MMOL/L (ref 135–147)
SODIUM SERPL-SCNC: 134 MMOL/L (ref 135–147)
SODIUM SERPL-SCNC: 135 MMOL/L (ref 135–147)
T WAVE AXIS: 38 DEGREES
TIBC SERPL-MCNC: 142.8 UG/DL (ref 250–450)
TRANSFERRIN SERPL-MCNC: 102 MG/DL (ref 203–362)
UIBC SERPL-MCNC: 133 UG/DL (ref 155–355)
VENTRICULAR RATE: 105 BPM
WBC # BLD AUTO: 20.48 THOUSAND/UL (ref 4.31–10.16)

## 2025-05-24 PROCEDURE — 83550 IRON BINDING TEST: CPT | Performed by: STUDENT IN AN ORGANIZED HEALTH CARE EDUCATION/TRAINING PROGRAM

## 2025-05-24 PROCEDURE — 85025 COMPLETE CBC W/AUTO DIFF WBC: CPT | Performed by: PHYSICIAN ASSISTANT

## 2025-05-24 PROCEDURE — 80074 ACUTE HEPATITIS PANEL: CPT | Performed by: PHYSICIAN ASSISTANT

## 2025-05-24 PROCEDURE — 84484 ASSAY OF TROPONIN QUANT: CPT | Performed by: EMERGENCY MEDICINE

## 2025-05-24 PROCEDURE — 85610 PROTHROMBIN TIME: CPT | Performed by: PHYSICIAN ASSISTANT

## 2025-05-24 PROCEDURE — 85730 THROMBOPLASTIN TIME PARTIAL: CPT | Performed by: PHYSICIAN ASSISTANT

## 2025-05-24 PROCEDURE — 80053 COMPREHEN METABOLIC PANEL: CPT | Performed by: PHYSICIAN ASSISTANT

## 2025-05-24 PROCEDURE — 82977 ASSAY OF GGT: CPT | Performed by: STUDENT IN AN ORGANIZED HEALTH CARE EDUCATION/TRAINING PROGRAM

## 2025-05-24 PROCEDURE — 82948 REAGENT STRIP/BLOOD GLUCOSE: CPT

## 2025-05-24 PROCEDURE — 99232 SBSQ HOSP IP/OBS MODERATE 35: CPT | Performed by: INTERNAL MEDICINE

## 2025-05-24 PROCEDURE — 84145 PROCALCITONIN (PCT): CPT | Performed by: PHYSICIAN ASSISTANT

## 2025-05-24 PROCEDURE — 93010 ELECTROCARDIOGRAM REPORT: CPT | Performed by: INTERNAL MEDICINE

## 2025-05-24 PROCEDURE — 83540 ASSAY OF IRON: CPT | Performed by: STUDENT IN AN ORGANIZED HEALTH CARE EDUCATION/TRAINING PROGRAM

## 2025-05-24 PROCEDURE — 86381 MITOCHONDRIAL ANTIBODY EACH: CPT | Performed by: STUDENT IN AN ORGANIZED HEALTH CARE EDUCATION/TRAINING PROGRAM

## 2025-05-24 PROCEDURE — 82728 ASSAY OF FERRITIN: CPT | Performed by: STUDENT IN AN ORGANIZED HEALTH CARE EDUCATION/TRAINING PROGRAM

## 2025-05-24 PROCEDURE — 83735 ASSAY OF MAGNESIUM: CPT | Performed by: PHYSICIAN ASSISTANT

## 2025-05-24 PROCEDURE — 82784 ASSAY IGA/IGD/IGG/IGM EACH: CPT | Performed by: STUDENT IN AN ORGANIZED HEALTH CARE EDUCATION/TRAINING PROGRAM

## 2025-05-24 PROCEDURE — 99254 IP/OBS CNSLTJ NEW/EST MOD 60: CPT | Performed by: STUDENT IN AN ORGANIZED HEALTH CARE EDUCATION/TRAINING PROGRAM

## 2025-05-24 PROCEDURE — 80048 BASIC METABOLIC PNL TOTAL CA: CPT | Performed by: PHYSICIAN ASSISTANT

## 2025-05-24 PROCEDURE — 84100 ASSAY OF PHOSPHORUS: CPT | Performed by: PHYSICIAN ASSISTANT

## 2025-05-24 RX ORDER — PHENOBARBITAL SODIUM 65 MG/ML
130 INJECTION, SOLUTION INTRAMUSCULAR; INTRAVENOUS ONCE
Status: COMPLETED | OUTPATIENT
Start: 2025-05-24 | End: 2025-05-24

## 2025-05-24 RX ORDER — MAGNESIUM SULFATE HEPTAHYDRATE 40 MG/ML
2 INJECTION, SOLUTION INTRAVENOUS ONCE
Status: COMPLETED | OUTPATIENT
Start: 2025-05-24 | End: 2025-05-25

## 2025-05-24 RX ORDER — HYDRALAZINE HYDROCHLORIDE 25 MG/1
25 TABLET, FILM COATED ORAL EVERY 8 HOURS SCHEDULED
Status: DISCONTINUED | OUTPATIENT
Start: 2025-05-24 | End: 2025-05-31

## 2025-05-24 RX ORDER — DEXTROSE MONOHYDRATE 25 G/50ML
25 INJECTION, SOLUTION INTRAVENOUS ONCE
Status: COMPLETED | OUTPATIENT
Start: 2025-05-24 | End: 2025-05-24

## 2025-05-24 RX ORDER — INSULIN LISPRO 100 [IU]/ML
1-5 INJECTION, SOLUTION INTRAVENOUS; SUBCUTANEOUS
Status: DISCONTINUED | OUTPATIENT
Start: 2025-05-24 | End: 2025-06-06 | Stop reason: HOSPADM

## 2025-05-24 RX ORDER — PHENOBARBITAL SODIUM 65 MG/ML
65 INJECTION, SOLUTION INTRAMUSCULAR; INTRAVENOUS ONCE
Status: COMPLETED | OUTPATIENT
Start: 2025-05-24 | End: 2025-05-24

## 2025-05-24 RX ADMIN — SODIUM CHLORIDE, SODIUM GLUCONATE, SODIUM ACETATE, POTASSIUM CHLORIDE, MAGNESIUM CHLORIDE, SODIUM PHOSPHATE, DIBASIC, AND POTASSIUM PHOSPHATE 250 ML/HR: .53; .5; .37; .037; .03; .012; .00082 INJECTION, SOLUTION INTRAVENOUS at 08:37

## 2025-05-24 RX ADMIN — INSULIN LISPRO 1 UNITS: 100 INJECTION, SOLUTION INTRAVENOUS; SUBCUTANEOUS at 22:22

## 2025-05-24 RX ADMIN — PHENOBARBITAL SODIUM 260 MG: 130 INJECTION INTRAMUSCULAR; INTRAVENOUS at 06:13

## 2025-05-24 RX ADMIN — SODIUM CHLORIDE 26.4 UNITS/HR: 9 INJECTION, SOLUTION INTRAVENOUS at 00:54

## 2025-05-24 RX ADMIN — INSULIN LISPRO 1 UNITS: 100 INJECTION, SOLUTION INTRAVENOUS; SUBCUTANEOUS at 16:55

## 2025-05-24 RX ADMIN — HYDRALAZINE HYDROCHLORIDE 10 MG: 20 INJECTION, SOLUTION INTRAMUSCULAR; INTRAVENOUS at 18:29

## 2025-05-24 RX ADMIN — CHLORHEXIDINE GLUCONATE 15 ML: 1.2 SOLUTION ORAL at 08:00

## 2025-05-24 RX ADMIN — POTASSIUM CHLORIDE 20 MEQ: 14.9 INJECTION, SOLUTION INTRAVENOUS at 01:56

## 2025-05-24 RX ADMIN — THIAMINE HYDROCHLORIDE: 100 INJECTION, SOLUTION INTRAMUSCULAR; INTRAVENOUS at 14:56

## 2025-05-24 RX ADMIN — POTASSIUM CHLORIDE 20 MEQ: 14.9 INJECTION, SOLUTION INTRAVENOUS at 03:43

## 2025-05-24 RX ADMIN — CEFTRIAXONE 1000 MG: 10 INJECTION, POWDER, FOR SOLUTION INTRAVENOUS at 22:06

## 2025-05-24 RX ADMIN — MAGNESIUM SULFATE HEPTAHYDRATE 2 G: 40 INJECTION, SOLUTION INTRAVENOUS at 13:52

## 2025-05-24 RX ADMIN — SODIUM CHLORIDE, SODIUM GLUCONATE, SODIUM ACETATE, POTASSIUM CHLORIDE, MAGNESIUM CHLORIDE, SODIUM PHOSPHATE, DIBASIC, AND POTASSIUM PHOSPHATE 250 ML/HR: .53; .5; .37; .037; .03; .012; .00082 INJECTION, SOLUTION INTRAVENOUS at 18:28

## 2025-05-24 RX ADMIN — SODIUM CHLORIDE, SODIUM GLUCONATE, SODIUM ACETATE, POTASSIUM CHLORIDE, MAGNESIUM CHLORIDE, SODIUM PHOSPHATE, DIBASIC, AND POTASSIUM PHOSPHATE 250 ML/HR: .53; .5; .37; .037; .03; .012; .00082 INJECTION, SOLUTION INTRAVENOUS at 13:53

## 2025-05-24 RX ADMIN — PHENOBARBITAL SODIUM 130 MG: 65 INJECTION INTRAMUSCULAR; INTRAVENOUS at 05:04

## 2025-05-24 RX ADMIN — DEXTROSE MONOHYDRATE 25 ML: 25 INJECTION, SOLUTION INTRAVENOUS at 06:13

## 2025-05-24 RX ADMIN — CHLORHEXIDINE GLUCONATE 15 ML: 1.2 SOLUTION ORAL at 22:06

## 2025-05-24 RX ADMIN — PHENOBARBITAL SODIUM 65 MG: 65 INJECTION INTRAMUSCULAR; INTRAVENOUS at 04:03

## 2025-05-24 RX ADMIN — HYDRALAZINE HYDROCHLORIDE 10 MG: 20 INJECTION, SOLUTION INTRAMUSCULAR; INTRAVENOUS at 10:19

## 2025-05-24 RX ADMIN — POTASSIUM PHOSPHATE, MONOBASIC POTASSIUM PHOSPHATE, DIBASIC 30 MMOL: 224; 236 INJECTION, SOLUTION, CONCENTRATE INTRAVENOUS at 15:45

## 2025-05-24 RX ADMIN — INSULIN LISPRO 1 UNITS: 100 INJECTION, SOLUTION INTRAVENOUS; SUBCUTANEOUS at 12:21

## 2025-05-24 RX ADMIN — HYDRALAZINE HYDROCHLORIDE 10 MG: 20 INJECTION, SOLUTION INTRAMUSCULAR; INTRAVENOUS at 03:43

## 2025-05-24 NOTE — H&P
H&P - Critical Care/ICU   Name: Wes Araujo 55 y.o. male I MRN: [id number]  Unit/Bed#: ICU 06 I Date of Admission: 5/23/2025   Date of Service: 5/23/2025 I Hospital Day: 0       Assessment & Plan  Primary hypertension  Presents with marked elevated blood pressure  noncompliant with medication  hydralazine 25 mg every 8, amlodipine 5 mg daily  Will give IV hydralazine 10 mg every 6 as needed pressures greater than 160  Type 2 diabetes mellitus with hyperglycemia, with long-term current use of insulin (HCC)  Lab Results   Component Value Date    HGBA1C 9.8 (H) 02/05/2025       Recent Labs     05/23/25  1803 05/23/25 2132   POCGLU >600* >600*       Blood Sugar Average: Last 72 hrs:  (P) 0    Glucose is up to 758  But not in DKA  Continue Isolyte at 250 ml/hr for now   Continue insulin drip  Continue IV fluid  Blood glucose every hour until glucose is less than 400    Alcohol abuse  Patient has a history of alcohol use disorder with a history of withdrawal and withdrawal seizures  His alcohol is negative  Will start thiamine folic acid and multivitamin  Severely altered with altered mental status appears to be in liver failure  If needed phenobarbital for withdrawal  Urinary drug screen  Hyponatremia  Pseudohyponatremia in setting of hyperglycemia fluids history of chronic hyponatremia secondary to excessive free water due to alcohol  Monitor closely  History of atrial fibrillation  Patient has a history of paroxysmal A-fib he is not on anticoagulation  Controlled with metoprolol  Noncompliance with medication  Toxic metabolic encephalopathy  Patient came into the ER, very agitated did not follow commands unable to communicate  ammonia was within normal limits, BUN was also within normal limits  CT of the head was negative  Check TSH folate and thiamine  Start on banana bag  Most likely in the setting of hyperglycemia continue insulin drip  For alcohol withdrawal  Silva catheter in place  CKD stage 3a, GFR 45-59  ml/min (HCC)  Lab Results   Component Value Date    EGFR 36 05/23/2025    EGFR 34 04/30/2025    EGFR 33 04/29/2025    CREATININE 1.99 (H) 05/23/2025    CREATININE 2.09 (H) 04/30/2025    CREATININE 2.14 (H) 04/29/2025   CKD in setting of hypertensive diabetic neuropathy  He has baseline creatinine is around 2 now, from last admission was 1.3-1.5  Consider nephrology consult    Acute liver failure  Patient appears to be in acute liver failure not on anticoagulation and an INR of 3.1  Total bilirubin at 4.18, direct at 2.5  : Phosphatase of 1069  AST 48 ALT 51  Platelet count normal at 220  Continue to monitor endpoint  Ultrasound of the upper quadrant for liver   Disposition: Stepdown Level 1    History of Present Illness   Wes Araujo is a 55 y.o. with a medical history of alcohol abuse, chronic pancreatitis, diabetes, A-fib, hypertension and noncompliance with medication the ER patient was found down with altered mental status patient was then very agitated and flailing, received Haldol and Ativan 2 mg IV and was more sedated.  Does not follow commands appears altered  Admitted to ICU as a stepdown 1 for possible Precedex versus phenobarbital for withdrawal of alcohol also a UST drug screen to see if any other drugs on board  Patient appears to be in acute liver failure with an INR of over 3 and bilirubin elevated at 4 which was normal approximately a month ago    History obtained from chart review and unobtainable from patient due to mental status.  Review of Systems: Review of Systems not obtainable due to Altered mental status    Historical Information   Past Medical History:  No date: Alcohol abuse  No date: Chronic pancreatitis (HCC)  No date: Diabetes mellitus (HCC)      Comment:  Type 2  No date: Pancreatitis  No date: Paroxysmal A-fib (HCC)  No date: Thrombocytopenia (HCC) Past Surgical History:  8/16/2020: INCISION AND DRAINAGE OF WOUND; N/A      Comment:  Procedure: INCISION AND DRAINAGE (I&D)  "HEAD/FACE;                 Surgeon: Estrada Walton DMD;  Location: [facility];                 Service: Maxillofacial  2/7/2019: IR BIOPSY BONE MARROW  8/16/2020: REMOVAL OF IMPACTED TOOTH - COMPLETELY BONY; N/A      Comment:  Procedure: EXTRACTION TEETH MULTIPLE #2, 3;  Surgeon:                Estrada Walton DMD;  Location: [facility];  Service:                Maxillofacial   Current Outpatient Medications   Medication Instructions    Alcohol Swabs 70 % PADS May substitute brand based on insurance coverage. Check glucose TID.    amLODIPine (NORVASC) 5 mg, Oral, 2 times daily    Blood Glucose Monitoring Suppl (OneTouch Verio Reflect) w/Device KIT May substitute brand based on insurance coverage. Check glucose TID.    folic acid ( FOLIC ACID) 1 mg, Oral, Daily    glucose blood (OneTouch Verio) test strip May substitute brand based on insurance coverage. Check glucose TID.    hydrALAZINE (APRESOLINE) 25 mg, Oral, Every 8 hours scheduled    insulin NPH-insulin regular (HumuLIN 70/30) 100 units/mL subcutaneous injection 5 Units, Subcutaneous, 2 times daily before meals, Do not give yourself insulin if you are not going to eat.    Insulin Syringe-Needle U-100 (BD Insulin Syringe U/F) 31G X 5/16\" 0.3 ML MISC For use with insulin. Pharmacy may dispense brand covered by insurance.    Multiple Vitamin (multivitamin) tablet 1 tablet, Oral, Daily    OneTouch Delica Lancets 33G MISC May substitute brand based on insurance coverage. Check glucose TID.    pancrelipase, Lip-Prot-Amyl, (CREON) 24,000 units 24,000 units of lipase, Oral, 3 times daily with meals    thiamine 50 mg, Oral, Daily    Allergies[1]   Social History[2] Family History[3]       Objective :                   Vitals I/O      Most Recent Min/Max in 24hrs   Temp 97.9 °F (36.6 °C) Temp  Min: 97.9 °F (36.6 °C)  Max: 97.9 °F (36.6 °C)   Pulse 87 Pulse  Min: 86  Max: 105   Resp 15 Resp  Min: 14  Max: 24   BP (!) 186/93 BP  Min: 186/93  Max: 205/102   O2 Sat 98 % SpO2  " Min: 97 %  Max: 98 %      Intake/Output Summary (Last 24 hours) at 5/23/2025 2202  Last data filed at 5/23/2025 2100  Gross per 24 hour   Intake 1000 ml   Output --   Net 1000 ml       Diet NPO    Invasive Monitoring           Physical Exam   Physical Exam  Vitals and nursing note reviewed.   Eyes:      General: Scleral icterus present.      Extraocular Movements: Extraocular movements intact.      Pupils: Pupils are equal, round, and reactive to light.   Skin:     General: Skin is warm, dry and not mottled extremities.      Capillary Refill: Capillary refill takes less than 2 seconds.      Coloration: Skin is jaundiced. Skin is not pale.   HENT:      Head: Normocephalic and atraumatic.   Cardiovascular:      Rate and Rhythm: Normal rate and regular rhythm.      Pulses: Normal pulses.      Heart sounds: Normal heart sounds.   Musculoskeletal:         General: Normal range of motion.      Cervical back: Full passive range of motion without pain, normal range of motion and neck supple.   Abdominal: General: Bowel sounds are normal.      Palpations: Abdomen is soft.   Constitutional:       General: He is awake.      Appearance: He is well-developed, normal weight and well-nourished. He is ill-appearing.   Pulmonary:      Effort: Pulmonary effort is normal.      Breath sounds: Normal breath sounds. No wheezing or rhonchi.   Psychiatric:         Behavior: Behavior is cooperative.   Neurological:      Mental Status: He is easily aroused. He is somnolent, disoriented to person, disoriented to place, disoriented to time and disoriented to situation.      Motor: Strength full and intact in all extremities.   Genitourinary/Anorectal:  external catheter present.        Diagnostic Studies        Lab Results: I have reviewed the following results:     Medications:  Scheduled PRN   [Held by provider] amLODIPine, 5 mg, BID  cefTRIAXone, 1,000 mg, Q24H  chlorhexidine, 15 mL, Q12H JAISON          Continuous    insulin regular (HumuLIN  R,NovoLIN R) 1 Units/mL in sodium chloride 0.9 % 100 mL infusion, 0.1-30 Units/hr, Last Rate: 6.6 Units/hr (05/23/25 2019)  multi-electrolyte, 250 mL/hr         Labs:   CBC    Recent Labs     05/23/25 1855   WBC 11.35*   HGB 9.7*   HCT 28.9*        BMP    Recent Labs     05/23/25 1855   SODIUM 125*   K 3.8   CL 92*   CO2 23   AGAP 10   BUN 12   CREATININE 1.99*   CALCIUM 8.6       Coags    Recent Labs     05/23/25 1855   INR 3.73*   PTT 30        Additional Electrolytes  No recent results       Blood Gas    Recent Labs     05/23/25 2058   PHART 7.360   KBK0JCM 37.2   PO2ART 107.5   YHZ2TDO 20.5*   BEART -4.4   SOURCE Brachial, Right     Recent Labs     05/23/25 1929 05/23/25 2058   PHVEN 7.230*  --    QTO8HCS 54.8*  --    PO2VEN 27.7*  --    YZE0ARA 22.4*  --    BEVEN -5.3  --    N9DSVPQ 48.2*  --    SOURCE  --  Brachial, Right    LFTs  Recent Labs     05/23/25 1855   ALT 51   AST 48*   ALKPHOS 1,069*   ALB 2.8*   TBILI 4.18*       Infectious  No recent results  Glucose  Recent Labs     05/23/25 1855   GLUC 785*      Critical Care Time Statement: Upon my evaluation, this patient had a high probability of imminent or life-threatening deterioration due to DKA and acute liver failure mental status alcohol abuse, which required my direct attention, intervention, and personal management.  I spent a total of 45 minutes directly providing critical care services, including interpretation of complex medical databases, evaluating for the presence of life-threatening injuries or illnesses, management of organ system failure(s) , complex medical decision making (to support/prevent further life-threatening deterioration)., interpretation of hemodynamic data, and titration of continuous IV medications (drips). This time is exclusive of procedures, teaching, treating other patients, family meetings, and any prior time recorded by providers other than myself.            [1] No Known Allergies  [2]   Social  History  Tobacco Use    Smoking status: Former     Passive exposure: Past    Smokeless tobacco: Never   Vaping Use    Vaping status: Never Used   Substance Use Topics    Alcohol use: Yes    Drug use: Not Currently     Types: Cocaine   [3]   Family History  Problem Relation Name Age of Onset    Diabetes Mother      Hypertension Mother      Hyperlipidemia Father

## 2025-05-24 NOTE — ASSESSMENT & PLAN NOTE
Lab Results   Component Value Date    HGBA1C 9.8 (H) 02/05/2025       Recent Labs     05/24/25  0040 05/24/25  0122 05/24/25  0228 05/24/25  0407   POCGLU 317* 237* 289* 166*       Blood Sugar Average: Last 72 hrs:  (P) 234.875    Glucose is up to 758  But not in DKA  Continue Isolyte at 250 ml/hr for now   Continue insulin drip  Continue IV fluid  Blood glucose every hour until glucose is less than 400

## 2025-05-24 NOTE — ASSESSMENT & PLAN NOTE
Lab Results   Component Value Date    EGFR 37 05/23/2025    EGFR 36 05/23/2025    EGFR 34 04/30/2025    CREATININE 1.97 (H) 05/23/2025    CREATININE 1.99 (H) 05/23/2025    CREATININE 2.09 (H) 04/30/2025   CKD in setting of hypertensive diabetic neuropathy  He has baseline creatinine is around 2 now, from last admission was 1.3-1.5  Consider nephrology consult

## 2025-05-24 NOTE — PLAN OF CARE
Problem: PAIN - ADULT  Goal: Verbalizes/displays adequate comfort level or baseline comfort level  Description: Interventions:  - Encourage patient to monitor pain and request assistance  - Assess pain using appropriate pain scale  - Administer analgesics as ordered based on type and severity of pain and evaluate response  - Implement non-pharmacological measures as appropriate and evaluate response  - Consider cultural and social influences on pain and pain management  - Notify physician/advanced practitioner if interventions unsuccessful or patient reports new pain  - Educate patient/family on pain management process including their role and importance of  reporting pain   - Provide non-pharmacologic/complimentary pain relief interventions  Outcome: Progressing     Problem: INFECTION - ADULT  Goal: Absence or prevention of progression during hospitalization  Description: INTERVENTIONS:  - Assess and monitor for signs and symptoms of infection  - Monitor lab/diagnostic results  - Monitor all insertion sites, i.e. indwelling lines, tubes, and drains  - Monitor endotracheal if appropriate and nasal secretions for changes in amount and color  - [place] appropriate cooling/warming therapies per order  - Administer medications as ordered  - Instruct and encourage patient and family to use good hand hygiene technique  - Identify and instruct in appropriate isolation precautions for identified infection/condition  Outcome: Progressing  Goal: Absence of fever/infection during neutropenic period  Description: INTERVENTIONS:  - Monitor WBC  - Perform strict hand hygiene  - Limit to healthy visitors only  - No plants, dried, fresh or silk flowers with mcclure in patient room  Outcome: Progressing     Problem: SAFETY ADULT  Goal: Patient will remain free of falls  Description: INTERVENTIONS:  - Educate patient/family on patient safety including physical limitations  - Instruct patient to call for assistance with activity   -  Consider consulting OT/PT to assist with strengthening/mobility based on AM PAC & JH-HLM score  - Consult OT/PT to assist with strengthening/mobility   - Keep Call bell within reach  - Keep bed low and locked with side rails adjusted as appropriate  - Keep care items and personal belongings within reach  - Initiate and maintain comfort rounds  - Make Fall Risk Sign visible to staff  - Offer Toileting every 2 Hours, in advance of need  - Initiate/Maintain bed alarm  - Obtain necessary fall risk management equipment  - Apply yellow socks and bracelet for high fall risk patients  - Consider moving patient to room near nurses station  Outcome: Progressing  Goal: Maintain or return to baseline ADL function  Description: INTERVENTIONS:  -  Assess patient's ability to carry out ADLs; assess patient's baseline for ADL function and identify physical deficits which impact ability to perform ADLs (bathing, care of mouth/teeth, toileting, grooming, dressing, etc.)  - Assess/evaluate cause of self-care deficits   - Assess range of motion  - Assess patient's mobility; develop plan if impaired  - Assess patient's need for assistive devices and provide as appropriate  - Encourage maximum independence but intervene and supervise when necessary  - Involve family in performance of ADLs  - Assess for home care needs following discharge   - Consider OT consult to assist with ADL evaluation and planning for discharge  - Provide patient education as appropriate  - Monitor functional capacity and physical performance, use of AM PAC & JH-HLM   - Monitor gait, balance and fatigue with ambulation    Outcome: Progressing  Goal: Maintains/Returns to pre admission functional level  Description: INTERVENTIONS:  - Perform AM-PAC 6 Click Basic Mobility/ Daily Activity assessment daily.  - Set and communicate daily mobility goal to care team and patient/family/caregiver.   - Collaborate with rehabilitation services on mobility goals if consulted  -  Perform Range of Motion 3 times a day.  - Reposition patient every 2 hours.  - Dangle patient 3 times a day  - Stand patient 3 times a day  - Ambulate patient 3 times a day  - Out of bed to chair 3 times a day   - Out of bed for meals 3 times a day  - Out of bed for toileting  - Record patient progress and toleration of activity level   Outcome: Progressing     Problem: DISCHARGE PLANNING  Goal: Discharge to home or other facility with appropriate resources  Description: INTERVENTIONS:  - Identify barriers to discharge w/patient and caregiver  - Arrange for needed discharge resources and transportation as appropriate  - Identify discharge learning needs (meds, wound care, etc.)  - Arrange for interpretive services to assist at discharge as needed  - Refer to Case Management Department for coordinating discharge planning if the patient needs post-hospital services based on physician/advanced practitioner order or complex needs related to functional status, cognitive ability, or social support system  Outcome: Progressing     Problem: Knowledge Deficit  Goal: Patient/family/caregiver demonstrates understanding of disease process, treatment plan, medications, and discharge instructions  Description: Complete learning assessment and assess knowledge base.  Interventions:  - Provide teaching at level of understanding  - Provide teaching via preferred learning methods  Outcome: Progressing     Problem: NEUROSENSORY - ADULT  Goal: Achieves stable or improved neurological status  Description: INTERVENTIONS  - Monitor and report changes in neurological status  - Monitor vital signs such as temperature, blood pressure, glucose, and any other labs ordered   - Initiate measures to prevent increased intracranial pressure  - Monitor for seizure activity and implement precautions if appropriate      Outcome: Progressing  Goal: Remains free of injury related to seizures activity  Description: INTERVENTIONS  - Maintain airway,  patient safety  and administer oxygen as ordered  - Monitor patient for seizure activity, document and report duration and description of seizure to physician/advanced practitioner  - If seizure occurs,  ensure patient safety during seizure  - Reorient patient post seizure  - Seizure pads on all 4 side rails  - Instruct patient/family to notify RN of any seizure activity including if an aura is experienced  - Instruct patient/family to call for assistance with activity based on nursing assessment  - Administer anti-seizure medications if ordered    Outcome: Progressing  Goal: Achieves maximal functionality and self care  Description: INTERVENTIONS  - Monitor swallowing and airway patency with patient fatigue and changes in neurological status  - Encourage and assist patient to increase activity and self care.   - Encourage visually impaired, hearing impaired and aphasic patients to use assistive/communication devices  Outcome: Progressing     Problem: CARDIOVASCULAR - ADULT  Goal: Maintains optimal cardiac output and hemodynamic stability  Description: INTERVENTIONS:  - Monitor I/O, vital signs and rhythm  - Monitor for S/S and trends of decreased cardiac output  - Administer and titrate ordered vasoactive medications to optimize hemodynamic stability  - Assess quality of pulses, skin color and temperature  - Assess for signs of decreased coronary artery perfusion  - Instruct patient to report change in severity of symptoms  Outcome: Progressing  Goal: Absence of cardiac dysrhythmias or at baseline rhythm  Description: INTERVENTIONS:  - Continuous cardiac monitoring, vital signs, obtain 12 lead EKG if ordered  - Administer antiarrhythmic and heart rate control medications as ordered  - Monitor electrolytes and administer replacement therapy as ordered  Outcome: Progressing     Problem: RESPIRATORY - ADULT  Goal: Achieves optimal ventilation and oxygenation  Description: INTERVENTIONS:  - Assess for changes in  respiratory status  - Assess for changes in mentation and behavior  - Position to facilitate oxygenation and minimize respiratory effort  - Oxygen administered by appropriate delivery if ordered  - Initiate smoking cessation education as indicated  - Encourage broncho-pulmonary hygiene including cough, deep breathe, Incentive Spirometry  - Assess the need for suctioning and aspirate as needed  - Assess and instruct to report SOB or any respiratory difficulty  - Respiratory Therapy support as indicated  Outcome: Progressing     Problem: GASTROINTESTINAL - ADULT  Goal: Minimal or absence of nausea and/or vomiting  Description: INTERVENTIONS:  - Administer IV fluids if ordered to ensure adequate hydration  - Maintain NPO status until nausea and vomiting are resolved  - Nasogastric tube if ordered  - Administer ordered antiemetic medications as needed  - Provide nonpharmacologic comfort measures as appropriate  - Advance diet as tolerated, if ordered  - Consider nutrition services referral to assist patient with adequate nutrition and appropriate food choices  Outcome: Progressing  Goal: Maintains or returns to baseline bowel function  Description: INTERVENTIONS:  - Assess bowel function  - Encourage oral fluids to ensure adequate hydration  - Administer IV fluids if ordered to ensure adequate hydration  - Administer ordered medications as needed  - Encourage mobilization and activity  - Consider nutritional services referral to assist patient with adequate nutrition and appropriate food choices  Outcome: Progressing  Goal: Maintains adequate nutritional intake  Description: INTERVENTIONS:  - Monitor percentage of each meal consumed  - Identify factors contributing to decreased intake, treat as appropriate  - Assist with meals as needed  - Monitor I&O, weight, and lab values if indicated  - Obtain nutrition services referral as needed  Outcome: Progressing  Goal: Establish and maintain optimal ostomy  function  Description: INTERVENTIONS:  - Assess bowel function  - Encourage oral fluids to ensure adequate hydration  - Administer IV fluids if ordered to ensure adequate hydration   - Administer ordered medications as needed  - Encourage mobilization and activity  - Nutrition services referral to assist patient with appropriate food choices  - Assess stoma site  - Consider wound care consult   Outcome: Progressing  Goal: Oral mucous membranes remain intact  Description: INTERVENTIONS  - Assess oral mucosa and hygiene practices  - Implement preventative oral hygiene regimen  - Implement oral medicated treatments as ordered  - Initiate Nutrition services referral as needed  Outcome: Progressing     Problem: SKIN/TISSUE INTEGRITY - ADULT  Goal: Skin Integrity remains intact(Skin Breakdown Prevention)  Description: Assess:  -Perform Jose assessment every shift   -Clean and moisturize skin every day   -Inspect skin when repositioning, toileting, and assisting with ADLS  -Assess extremities for adequate circulation and sensation     Bed Management:  -Have minimal linens on bed & keep smooth, unwrinkled  -Change linens as needed when moist or perspiring  -Avoid sitting or lying in one position for more than 2 hours while in bed  -Keep HOB at 30 degrees     Toileting:  -Offer bedside commode  -Assess for incontinence every 2 hours   -Use incontinent care products after each incontinent episode such as incontinence wipes     Activity:  -Mobilize patient 3 times a day  -Encourage activity and walks on unit  -Encourage or provide ROM exercises   -Turn and reposition patient every 2 Hours  -Use appropriate equipment to lift or move patient in bed  -Instruct/ Assist with weight shifting every 3 when out of bed in chair  -Consider limitation of chair time 3 hour intervals    Skin Care:  -Avoid use of baby powder, tape, friction and shearing, hot water or constrictive clothing  -Relieve pressure over bony prominences using foam  cushion  -Do not massage red bony areas    Next Steps:  -Teach patient strategies to minimize risks such as immobility   -Consider consults to  interdisciplinary teams such as physical therapy  Outcome: Progressing  Goal: Incision(s), wounds(s) or drain site(s) healing without S/S of infection  Description: INTERVENTIONS  - Assess and document dressing, incision, wound bed, drain sites and surrounding tissue  - Provide patient and family education  - Perform skin care/dressing changes every day   Outcome: Progressing  Goal: Pressure injury heals and does not worsen  Description: Interventions:  - Implement low air loss mattress or specialty surface (Criteria met)  - Apply silicone foam dressing  - Instruct/assist with weight shifting every 30 minutes when in chair   - Limit chair time to 2 hour intervals  - Use special pressure reducing interventions such as waffle cushion  when in chair   - Apply fecal or urinary incontinence containment device   - Perform passive or active ROM every 3 hours   - Turn and reposition patient & offload bony prominences every 2 hours   - Utilize friction reducing device or surface for transfers   - Consider consults to  interdisciplinary teams such as physical therapy  - Use incontinent care products after each incontinent episode such as incontinence wipes   - Consider nutrition services referral as needed  Outcome: Progressing     Problem: SAFETY,RESTRAINT: NV/NON-SELF DESTRUCTIVE BEHAVIOR  Goal: Remains free of harm/injury (restraint for non violent/non self-detsructive behavior)  Description: INTERVENTIONS:  - Instruct patient/family regarding restraint use   - Assess and monitor physiologic and psychological status   - Provide interventions and comfort measures to meet assessed patient needs   - Identify and implement measures to help patient regain control  - Assess readiness for release of restraint   Outcome: Progressing  Goal: Returns to optimal restraint-free  functioning  Description: INTERVENTIONS:  - Assess the patient's behavior and symptoms that indicate continued need for restraint  - Identify and implement measures to help patient regain control  - Assess readiness for release of restraint   Outcome: Progressing

## 2025-05-24 NOTE — PROGRESS NOTES
Progress Note - Critical Care/ICU   Name: Wes Araujo 55 y.o. male I MRN: [id number]  Unit/Bed#: ICU 06 I Date of Admission: 5/23/2025   Date of Service: 5/24/2025 I Hospital Day: 1      Assessment & Plan  Primary hypertension  Presents with marked elevated blood pressure  noncompliant with medication  hydralazine 25 mg every 8, amlodipine 5 mg daily  Will give IV hydralazine 10 mg every 6 as needed pressures greater than 160  Type 2 diabetes mellitus with hyperglycemia, with long-term current use of insulin ([facility])  Lab Results   Component Value Date    HGBA1C 9.8 (H) 02/05/2025       Recent Labs     05/24/25  0040 05/24/25  0122 05/24/25  0228 05/24/25  0407   POCGLU 317* 237* 289* 166*       Blood Sugar Average: Last 72 hrs:  (P) 234.875    Glucose is up to 758  But not in DKA  Continue Isolyte at 250 ml/hr for now   Continue insulin drip  Continue IV fluid  Blood glucose every hour until glucose is less than 400    Alcohol abuse  Patient has a history of alcohol use disorder with a history of withdrawal and withdrawal seizures  His alcohol is negative  Will start thiamine folic acid and multivitamin  Severely altered with altered mental status appears to be in liver failure  If needed phenobarbital for withdrawal  Urinary drug screen  Hyponatremia  Pseudohyponatremia in setting of hyperglycemia fluids history of chronic hyponatremia secondary to excessive free water due to alcohol  Monitor closely  History of atrial fibrillation  Patient has a history of paroxysmal A-fib he is not on anticoagulation  Controlled with metoprolol  Noncompliance with medication  Toxic metabolic encephalopathy  Patient came into the ER, very agitated did not follow commands unable to communicate  ammonia was within normal limits, BUN was also within normal limits  CT of the head was negative  Check TSH folate and thiamine  Start on banana bag  Most likely in the setting of hyperglycemia continue insulin drip  For alcohol  withdrawal  Silva catheter in place  CKD stage 3a, GFR 45-59 ml/min ([facility])  Lab Results   Component Value Date    EGFR 37 05/23/2025    EGFR 36 05/23/2025    EGFR 34 04/30/2025    CREATININE 1.97 (H) 05/23/2025    CREATININE 1.99 (H) 05/23/2025    CREATININE 2.09 (H) 04/30/2025   CKD in setting of hypertensive diabetic neuropathy  He has baseline creatinine is around 2 now, from last admission was 1.3-1.5  Consider nephrology consult    Acute liver failure  Patient appears to be in acute liver failure not on anticoagulation and an INR of 3.1  Total bilirubin at 4.18, direct at 2.5  : Phosphatase of 1069  AST 48 ALT 51  Platelet count normal at 220  Continue to monitor endpoint  Hepatitis panel  Ultrasound of the upper quadrant for liver - Heterogeneous appearance of the pancreas, correlates with fatty replacement and calcifications seen on prior CT, possibly sequela of chronic inflammation/chronic pancreatitis.Liver appears grossly unremarkable.Small volume hypoechoic ascites, and other findings as above.  Electrolyte abnormality  Pleat magnesium to greater than 2.2 potassium greater than 4.2 and calcium greater than 1.2 ionized  Disposition: Stepdown Level 1    ICU Core Measures     A: Assess, Prevent, and Manage Pain Has pain been assessed? N/A  Need for changes to pain regimen? N/A   B: Both SAT/SAT  N/A   C: Choice of Sedation RASS Goal: 0 Alert and Calm or N/A patient not on sedation  Need for changes to sedation or analgesia regimen? N/A   D: Delirium CAM-ICU: Unable to perform secondary to Acute cognitive dysfunction   E: Early Mobility  Plan for early mobility? Yes   F: Family Engagement Plan for family engagement today? Yes       Antibiotic Review: Review eval with procalcitonin, most likely SIRS       Prophylaxis:  VTE VTE covered by:    None    Contraindicated secondary to: INR 3    Stress Ulcer  not ordered         24 Hour Events :     Patient continues to be altered, started getting agitated received 260  mg of IV phenobarb with good result  Patient is on ceftriaxone 1 g due to a procalcitonin of 1 no source of infection no fevers hyperglycemia and possible liver failure send hepatitis panel  Redose with phenobarb 65 mg IV x 1 , phenobarb 130 mg IV x 1       Subjective   Review of Systems: Review of Systems not obtainable due to Altered mental status    Objective :                   Vitals I/O      Most Recent Min/Max in 24hrs   Temp 97.6 °F (36.4 °C) Temp  Min: 96.9 °F (36.1 °C)  Max: 97.9 °F (36.6 °C)   Pulse 94 Pulse  Min: 86  Max: 105   Resp 17 Resp  Min: 14  Max: 33   /82 BP  Min: 138/65  Max: 222/105   O2 Sat 99 % SpO2  Min: 97 %  Max: 100 %      Intake/Output Summary (Last 24 hours) at 5/24/2025 0448  Last data filed at 5/23/2025 2100  Gross per 24 hour   Intake 1000 ml   Output --   Net 1000 ml       Diet NPO    Invasive Monitoring           Physical Exam   Physical Exam  Vitals and nursing note reviewed.   Eyes:      Extraocular Movements: Extraocular movements intact.      Pupils: Pupils are equal, round, and reactive to light.   Skin:     General: Skin is warm, dry and not mottled extremities.      Capillary Refill: Capillary refill takes less than 2 seconds.      Coloration: Skin is jaundiced and pale.   HENT:      Head: Normocephalic and atraumatic.   Cardiovascular:      Rate and Rhythm: Normal rate and regular rhythm.      Pulses: Normal pulses.      Heart sounds: Normal heart sounds.   Musculoskeletal:         General: Normal range of motion.      Cervical back: Full passive range of motion without pain, normal range of motion and neck supple.   Abdominal: General: Bowel sounds are normal.      Palpations: Abdomen is soft.   Constitutional:       General: He is awake.      Appearance: He is well-developed and well-nourished. He is ill-appearing.   Pulmonary:      Effort: Pulmonary effort is normal.      Breath sounds: Normal breath sounds. No wheezing or rhonchi.   Psychiatric:         Behavior:  Behavior is cooperative.   Neurological:      Mental Status: He is easily aroused. He is disoriented to person, disoriented to place, disoriented to time and disoriented to situation.      Motor: Strength full and intact in all extremities.          Diagnostic Studies        Lab Results: I have reviewed the following results:     Medications:  Scheduled PRN   [Held by provider] amLODIPine, 5 mg, BID  cefTRIAXone, 1,000 mg, Q24H  chlorhexidine, 15 mL, Q12H JAISON  PHENobarbital, 130 mg, Once  potassium chloride, 20 mEq, Q2H  potassium phosphate, 30 mmol, Once      hydrALAZINE, 10 mg, Q4H PRN       Continuous    insulin regular (HumuLIN R,NovoLIN R) 1 Units/mL in sodium chloride 0.9 % 100 mL infusion, 0.3-21 Units/hr, Last Rate: 6 Units/hr (05/24/25 0408)  multi-electrolyte, 250 mL/hr, Last Rate: 250 mL/hr (05/23/25 2203)         Labs:   CBC    Recent Labs     05/23/25 1855   WBC 11.35*   HGB 9.7*   HCT 28.9*        BMP    Recent Labs     05/23/25 1855 05/23/25 2155   SODIUM 125* 128*   K 3.8 3.4*   CL 92* 97   CO2 23 23   AGAP 10 8   BUN 12 13   CREATININE 1.99* 1.97*   CALCIUM 8.6 8.2*       Coags    Recent Labs     05/23/25 1855   INR 3.73*   PTT 30        Additional Electrolytes  Recent Labs     05/23/25 2155   MG 1.9   PHOS 1.9*   CAIONIZED 1.13          Blood Gas    Recent Labs     05/23/25 2058   PHART 7.360   DRR0ARO 37.2   PO2ART 107.5   [id number] 20.5*   BEART -4.4   SOURCE Brachial, Right     Recent Labs     05/23/25 1929 05/23/25 2058   PHVEN 7.230*  --    [id number] 54.8*  --    PO2VEN 27.7*  --    [id number] 22.4*  --    BEVEN -5.3  --    [id number] 48.2*  --    SOURCE  --  Brachial, Right    LFTs  Recent Labs     05/23/25 1855   ALT 51   AST 48*   ALKPHOS 1,069*   ALB 2.8*   TBILI 4.18*       Infectious  Recent Labs     05/23/25  2155   PROCALCITONI 1.09*     Glucose  Recent Labs     05/23/25  1855 05/23/25  2155   GLUC 785* 615*

## 2025-05-24 NOTE — ASSESSMENT & PLAN NOTE
55 y.o. male with history of hypertension, CKD, type 2 diabetes mellitus, chronic pancreatitis due to alcohol abuse with previous withdrawal seizures who presented on 5/23 due to altered mental status after reportedly not taking full amount of insulin at home found to have significant hypertension and glucose of 785 with elevated liver enzymes with AST 43, ALT 42, alkaline phosphatase 861 and total bilirubin 2.9 as well as leukocytosis with WBCs 20 and INR 4.5 with right upper quadrant ultrasound notable for no intrahepatic biliary ductal dilatation, but CBD not well-seen with small volume ascites overall concerning for acute metabolic encephalopathy in the setting of hyperglycemia versus alcohol withdrawal versus acute hepatopathy leading to GI consult.      - Follow-up hepatitis panel  - Send chronic liver serologies  - Trend liver enzymes and INR  - Consider vitamin K trial if INR not improving tomorrow  - Recommend attempt at diagnostic paracentesis to evaluate for SBP  - Counseled on importance of alcohol cessation  - Avoid hepatotoxic medications  - Appreciate excellent ICU care with normalizing hyperglycemia   Patient is a 79y old  Male who presents with a chief complaint of pelvic fx with active extrav (27 Mar 2018 12:01)      Interval Events:    REVIEW OF SYSTEMS:  [ ] Positive  [ ] All other systems negative  [x ] Unable to assess ROS because ________    Vital Signs Last 24 Hrs  T(C): 36.9 (05-01-18 @ 09:30), Max: 37.1 (05-01-18 @ 04:13)  T(F): 98.5 (05-01-18 @ 09:30), Max: 98.7 (05-01-18 @ 04:13)  HR: 70 (05-01-18 @ 09:30) (62 - 78)  BP: 132/53 (05-01-18 @ 09:30) (112/65 - 138/57)  RR: 24 (05-01-18 @ 09:30) (18 - 26)  SpO2: 100% (05-01-18 @ 09:30) (98% - 100%)    PHYSICAL EXAM:  HEENT:   [x ]Tracheostomy: 7 portex cuffed  [ ]Pupils equal  [ ]No oral lesions  [ ]Abnormal    SKIN  [x ]No Rash  [ ] Abnormal  [ ] pressure    CARDIAC  [ x]Regular  [ ]Abnormal    PULMONARY  [ x]Bilateral Clear Breath Sounds  [ ]Normal Excursion  [ ]Abnormal    GI  [x ]PEG      [x ] +BS		              [x ]Soft, nondistended, nontender	  [ ]Abnormal    MUSCULOSKELETAL                                   [ ]Bedbound                 [ ]Abnormal    [ x]Ambulatory/OOB to chair                           EXTREMITIES                                         [ ]Normal  [ x]Edema                           NEUROLOGIC  [x ] Normal, non focal  [ ] Focal findings:    PSYCHIATRIC  [ ]Alert and appropriate  [ ] Sedated	 [ ]Agitated    :  Ybarra: [ ] Yes, if yes: Date of Placement:                   [x ] No    LINES: Central Lines [ ] Yes, if yes: Date of Placement                                     [ x ] No    HOSPITAL MEDICATIONS:  MEDICATIONS  (STANDING):  ALBUTerol/ipratropium for Nebulization 3 milliLiter(s) Nebulizer every 6 hours  aspirin  chewable 81 milliGRAM(s) Oral daily  ceFAZolin   IVPB      ceFAZolin   IVPB 1000 milliGRAM(s) IV Intermittent every 8 hours  clopidogrel Tablet 75 milliGRAM(s) Oral daily  doxazosin 2 milliGRAM(s) Oral at bedtime  enoxaparin Injectable 40 milliGRAM(s) SubCutaneous daily  famotidine    Tablet 20 milliGRAM(s) Oral two times a day  insulin lispro (HumaLOG) corrective regimen sliding scale   SubCutaneous every 6 hours  insulin NPH human recombinant 6 Unit(s) SubCutaneous <User Schedule>  insulin NPH human recombinant 3 Unit(s) SubCutaneous <User Schedule>  lactobacillus acidophilus 1 Tablet(s) Oral every 12 hours  lidocaine   Patch 1 Patch Transdermal daily  lidocaine   Patch 1 Patch Transdermal daily  metoclopramide Injectable 5 milliGRAM(s) IV Push every 8 hours  metoprolol tartrate 100 milliGRAM(s) Oral every 12 hours  nystatin    Suspension 239041 Unit(s) Oral four times a day  QUEtiapine 25 milliGRAM(s) Oral every 12 hours  simethicone 80 milliGRAM(s) Chew every 8 hours  simvastatin 40 milliGRAM(s) Oral at bedtime  sodium chloride 7% Inhalation 3 milliLiter(s) Inhalation every 12 hours  valproic  acid Syrup 125 milliGRAM(s) Oral at bedtime  valproic  acid Syrup 125 milliGRAM(s) Oral daily  vancomycin    Solution 125 milliGRAM(s) Oral every 6 hours    MEDICATIONS  (PRN):  acetaminophen    Suspension. 650 milliGRAM(s) Oral every 6 hours PRN Mild Pain (1 - 3)  acetaminophen    Suspension. 650 milliGRAM(s) Oral every 6 hours PRN mild to moderate pain  haloperidol    Injectable 1 milliGRAM(s) IV Push every 6 hours PRN severe agitation  melatonin 3 milliGRAM(s) Oral at bedtime PRN Insomnia  QUEtiapine 12.5 milliGRAM(s) Oral every 6 hours PRN agitation  QUEtiapine 25 milliGRAM(s) Oral every 6 hours PRN agitation      LABS:                        9.5    11.2  )-----------( 230      ( 01 May 2018 06:24 )             29.6     05-01    143  |  102  |  44<H>  ----------------------------<  93  4.5   |  31  |  0.79    Ca    8.8      01 May 2018 06:24  Phos  2.6     05-01  Mg     2.4     05-01              CAPILLARY BLOOD GLUCOSE    MICROBIOLOGY:     RADIOLOGY:  [ ] Reviewed and interpreted by me    Mode: CPAP with PS  FiO2: 40  PEEP: 5  PS: 5  MAP: 7  PIP: 11

## 2025-05-24 NOTE — ASSESSMENT & PLAN NOTE
Patient came into the ER, very agitated did not follow commands unable to communicate  ammonia was within normal limits, BUN was also within normal limits  CT of the head was negative  Check TSH folate and thiamine  Start on banana bag  Most likely in the setting of hyperglycemia continue insulin drip  For alcohol withdrawal  Silva catheter in place   Substance abuse

## 2025-05-24 NOTE — ASSESSMENT & PLAN NOTE
Pseudohyponatremia in setting of hyperglycemia fluids history of chronic hyponatremia secondary to excessive free water due to alcohol  Monitor closely

## 2025-05-24 NOTE — ASSESSMENT & PLAN NOTE
Presents with marked elevated blood pressure  noncompliant with medication  hydralazine 25 mg every 8, amlodipine 5 mg daily  Will give IV hydralazine 10 mg every 6 as needed pressures greater than 160

## 2025-05-24 NOTE — ASSESSMENT & PLAN NOTE
Patient came into the ER, very agitated did not follow commands unable to communicate  ammonia was within normal limits, BUN was also within normal limits  CT of the head was negative  Check TSH folate and thiamine  Start on banana bag  Most likely in the setting of hyperglycemia continue insulin drip  For alcohol withdrawal  Silva catheter in place   no

## 2025-05-24 NOTE — ASSESSMENT & PLAN NOTE
Lab Results   Component Value Date    HGBA1C 9.8 (H) 02/05/2025       Recent Labs     05/23/25  1803 05/23/25  2132   POCGLU >600* >600*       Blood Sugar Average: Last 72 hrs:  (P) 0    Glucose is up to 758  But not in DKA  Continue Isolyte at 250 ml/hr for now   Continue insulin drip  Continue IV fluid  Blood glucose every hour until glucose is less than 400

## 2025-05-24 NOTE — ASSESSMENT & PLAN NOTE
Pleat magnesium to greater than 2.2 potassium greater than 4.2 and calcium greater than 1.2 ionized

## 2025-05-24 NOTE — ASSESSMENT & PLAN NOTE
Lab Results   Component Value Date    EGFR 36 05/23/2025    EGFR 34 04/30/2025    EGFR 33 04/29/2025    CREATININE 1.99 (H) 05/23/2025    CREATININE 2.09 (H) 04/30/2025    CREATININE 2.14 (H) 04/29/2025   CKD in setting of hypertensive diabetic neuropathy  He has baseline creatinine is around 2 now, from last admission was 1.3-1.5  Consider nephrology consult

## 2025-05-24 NOTE — ASSESSMENT & PLAN NOTE
Patient has a history of paroxysmal A-fib he is not on anticoagulation  Controlled with metoprolol  Noncompliance with medication

## 2025-05-24 NOTE — ASSESSMENT & PLAN NOTE
Patient has a history of alcohol use disorder with a history of withdrawal and withdrawal seizures  His alcohol is negative  Will start thiamine folic acid and multivitamin  Severely altered with altered mental status appears to be in liver failure  If needed phenobarbital for withdrawal  Urinary drug screen

## 2025-05-24 NOTE — ASSESSMENT & PLAN NOTE
Patient appears to be in acute liver failure not on anticoagulation and an INR of 3.1  Total bilirubin at 4.18, direct at 2.5  : Phosphatase of 1069  AST 48 ALT 51  Platelet count normal at 220  Continue to monitor endpoint  Ultrasound of the upper quadrant for liver

## 2025-05-24 NOTE — CONSULTS
Consultation - Gastroenterology   Name: Wes Araujo 55 y.o. male I MRN: [id number]  Unit/Bed#: ICU 06 I Date of Admission: 5/23/2025   Date of Service: 5/24/2025 I Hospital Day: 1       Inpatient consult to gastroenterology     Date/Time  5/24/2025 8:05 AM     Performed by  Vishnu Larsen MD   Authorized by  Lauren Rivas PA-C           Physician Requesting Evaluation: Akira Payton MD   Reason for Evaluation / Principal Problem: Alcohol abuse, transaminitis    Assessment & Plan  Alcohol abuse  55 y.o. male with history of hypertension, CKD, type 2 diabetes mellitus, chronic pancreatitis due to alcohol abuse with previous withdrawal seizures who presented on 5/23 due to altered mental status after reportedly not taking full amount of insulin at home found to have significant hypertension and glucose of 785 with elevated liver enzymes with AST 43, ALT 42, alkaline phosphatase 861 and total bilirubin 2.9 as well as leukocytosis with WBCs 20 and INR 4.5 with right upper quadrant ultrasound notable for no intrahepatic biliary ductal dilatation, but CBD not well-seen with small volume ascites overall concerning for acute metabolic encephalopathy in the setting of hyperglycemia versus alcohol withdrawal versus acute hepatopathy leading to GI consult.      - Follow-up hepatitis panel  - Send chronic liver serologies  - Trend liver enzymes and INR  - Consider vitamin K trial if INR not improving tomorrow  - Recommend attempt at diagnostic paracentesis to evaluate for SBP  - Counseled on importance of alcohol cessation  - Avoid hepatotoxic medications  - Appreciate excellent ICU care with normalizing hyperglycemia  Hyponatremia    Toxic metabolic encephalopathy    I have discussed the above management plan in detail with the primary service.     History of Present Illness   HPI:  Wes Araujo is a 55 y.o. male with history of hypertension, CKD, type 2 diabetes mellitus, chronic pancreatitis and alcohol  abuse who presented on 5/23 due to altered mental status.  Reportedly not taking full amount of insulin at home.  Found to have elevated liver enzymes with AST 43, ALT 42, alkaline phosphatase 861 and total bilirubin 2.9 as well as leukocytosis with WBCs 20 and INR 4.5.  Right upper quadrant ultrasound notable for no intrahepatic biliary ductal dilatation, but CBD not well-seen with small volume ascites.  He was recently hospitalized at the end of April for acute on chronic pancreatitis after presenting with abdominal pain.  He was assessed for paracentesis, but not able to be performed given adequate fluid.  Urine drug screen on admission negative.    EGD/colonoscopy 2/10/2025 showing erosive gastritis, single small Dieulafoy's lesion status post hemostatic clip and internal, no varices with 2 subcentimeter polyps removed with poor prep recall 3 years    Review of systems limited by encephalopathy even with standard .    Historical Information   Past Medical History[1]  Past Surgical History[2]  Social History[3]  E-Cigarette/Vaping    E-Cigarette Use Never User      E-Cigarette/Vaping Substances    Nicotine No     THC No     CBD No     Flavoring No     Other No     Unknown No      Family history non-contributory    Objective :  Temp:  [96.9 °F (36.1 °C)-98.7 °F (37.1 °C)] 98.7 °F (37.1 °C)  HR:  [] 101  BP: (121-222)/() 167/79  Resp:  [14-35] 17  SpO2:  [97 %-100 %] 98 %  O2 Device: None (Room air)    Physical Exam  Vitals and nursing note reviewed.   Constitutional:       General: He is in acute distress.      Appearance: He is well-developed and normal weight.      Comments: Altered, no alert or oriented   HENT:      Head: Normocephalic and atraumatic.   Pulmonary:      Effort: Pulmonary effort is normal. No respiratory distress.   Abdominal:      General: Abdomen is flat. There is distension. There is no abdominal bruit. There are no signs of injury.      Palpations: Abdomen is soft.  There is no mass.      Tenderness: There is no abdominal tenderness.     Skin:     General: Skin is warm and dry.           Lab Results: I have reviewed the following results:none    Imaging Results Review: No pertinent imaging studies reviewed.  Other Study Results Review: No additional pertinent studies reviewed.    Administrative Statements   I have spent a total time of 32 minutes in caring for this patient on the day of the visit/encounter including Diagnostic results, Prognosis, Risks and benefits of tx options, Instructions for management, and Patient and family education.         [1]   Past Medical History:  Diagnosis Date    Alcohol abuse     Chronic pancreatitis (HCC)     Diabetes mellitus (HCC)     Type 2    Non compliance w medication regimen     Pancreatitis     Paroxysmal A-fib (HCC)     Thrombocytopenia (HCC)    [2]   Past Surgical History:  Procedure Laterality Date    INCISION AND DRAINAGE OF WOUND N/A 8/16/2020    Procedure: INCISION AND DRAINAGE (I&D) HEAD/FACE;  Surgeon: Estrada Walton DMD;  Location: [facility];  Service: Maxillofacial    IR BIOPSY BONE MARROW  2/7/2019    REMOVAL OF IMPACTED TOOTH - COMPLETELY BONY N/A 8/16/2020    Procedure: EXTRACTION TEETH MULTIPLE #2, 3;  Surgeon: Estrada Walton DMD;  Location: [facility];  Service: Maxillofacial   [3]   Social History  Tobacco Use    Smoking status: Former     Passive exposure: Past    Smokeless tobacco: Never   Vaping Use    Vaping status: Never Used   Substance and Sexual Activity    Alcohol use: Yes    Drug use: Not Currently     Types: Cocaine

## 2025-05-24 NOTE — ASSESSMENT & PLAN NOTE
Patient appears to be in acute liver failure not on anticoagulation and an INR of 3.1  Total bilirubin at 4.18, direct at 2.5  : Phosphatase of 1069  AST 48 ALT 51  Platelet count normal at 220  Continue to monitor endpoint  Hepatitis panel  Ultrasound of the upper quadrant for liver - Heterogeneous appearance of the pancreas, correlates with fatty replacement and calcifications seen on prior CT, possibly sequela of chronic inflammation/chronic pancreatitis.Liver appears grossly unremarkable.Small volume hypoechoic ascites, and other findings as above.

## 2025-05-25 LAB
ALBUMIN SERPL BCG-MCNC: 2.5 G/DL (ref 3.5–5)
ALP SERPL-CCNC: 785 U/L (ref 34–104)
ALT SERPL W P-5'-P-CCNC: 37 U/L (ref 7–52)
AST SERPL W P-5'-P-CCNC: 60 U/L (ref 13–39)
BASOPHILS # BLD MANUAL: 0 THOUSAND/UL (ref 0–0.1)
BASOPHILS NFR MAR MANUAL: 0 % (ref 0–1)
BILIRUB DIRECT SERPL-MCNC: 1.8 MG/DL (ref 0–0.2)
BILIRUB SERPL-MCNC: 3.17 MG/DL (ref 0.2–1)
CA-I BLD-SCNC: 1.04 MMOL/L (ref 1.12–1.32)
EOSINOPHIL # BLD MANUAL: 0.19 THOUSAND/UL (ref 0–0.4)
EOSINOPHIL NFR BLD MANUAL: 1 % (ref 0–6)
ERYTHROCYTE [DISTWIDTH] IN BLOOD BY AUTOMATED COUNT: 13.3 % (ref 11.6–15.1)
GLUCOSE SERPL-MCNC: 112 MG/DL (ref 65–140)
GLUCOSE SERPL-MCNC: 130 MG/DL (ref 65–140)
GLUCOSE SERPL-MCNC: 138 MG/DL (ref 65–140)
GLUCOSE SERPL-MCNC: 159 MG/DL (ref 65–140)
HAV IGM SER QL: NORMAL
HBV CORE IGM SER QL: NORMAL
HBV SURFACE AG SER QL: NORMAL
HCT VFR BLD AUTO: 27.4 % (ref 36.5–49.3)
HCV AB SER QL: NORMAL
HGB BLD-MCNC: 9.5 G/DL (ref 12–17)
INR PPP: 4.09 (ref 0.85–1.19)
LYMPHOCYTES # BLD AUTO: 13 % (ref 14–44)
LYMPHOCYTES # BLD AUTO: 2.49 THOUSAND/UL (ref 0.6–4.47)
MAGNESIUM SERPL-MCNC: 2.6 MG/DL (ref 1.9–2.7)
MCH RBC QN AUTO: 30.6 PG (ref 26.8–34.3)
MCHC RBC AUTO-ENTMCNC: 34.7 G/DL (ref 31.4–37.4)
MCV RBC AUTO: 88 FL (ref 82–98)
MONOCYTES # BLD AUTO: 0.77 THOUSAND/UL (ref 0–1.22)
MONOCYTES NFR BLD: 4 % (ref 4–12)
NEUTROPHILS # BLD MANUAL: 15.7 THOUSAND/UL (ref 1.85–7.62)
NEUTS SEG NFR BLD AUTO: 82 % (ref 43–75)
PHOSPHATE SERPL-MCNC: 4.7 MG/DL (ref 2.7–4.5)
PLATELET # BLD AUTO: 306 THOUSANDS/UL (ref 149–390)
PLATELET BLD QL SMEAR: ADEQUATE
PMV BLD AUTO: 11.3 FL (ref 8.9–12.7)
PROT SERPL-MCNC: 5.5 G/DL (ref 6.4–8.4)
PROTHROMBIN TIME: 38.7 SECONDS (ref 12.3–15)
RBC # BLD AUTO: 3.1 MILLION/UL (ref 3.88–5.62)
RBC MORPH BLD: PRESENT
TARGETS BLD QL SMEAR: PRESENT
WBC # BLD AUTO: 19.15 THOUSAND/UL (ref 4.31–10.16)

## 2025-05-25 PROCEDURE — 82948 REAGENT STRIP/BLOOD GLUCOSE: CPT

## 2025-05-25 PROCEDURE — 83735 ASSAY OF MAGNESIUM: CPT | Performed by: STUDENT IN AN ORGANIZED HEALTH CARE EDUCATION/TRAINING PROGRAM

## 2025-05-25 PROCEDURE — 82390 ASSAY OF CERULOPLASMIN: CPT | Performed by: STUDENT IN AN ORGANIZED HEALTH CARE EDUCATION/TRAINING PROGRAM

## 2025-05-25 PROCEDURE — 85610 PROTHROMBIN TIME: CPT | Performed by: STUDENT IN AN ORGANIZED HEALTH CARE EDUCATION/TRAINING PROGRAM

## 2025-05-25 PROCEDURE — 85007 BL SMEAR W/DIFF WBC COUNT: CPT | Performed by: STUDENT IN AN ORGANIZED HEALTH CARE EDUCATION/TRAINING PROGRAM

## 2025-05-25 PROCEDURE — 80076 HEPATIC FUNCTION PANEL: CPT | Performed by: STUDENT IN AN ORGANIZED HEALTH CARE EDUCATION/TRAINING PROGRAM

## 2025-05-25 PROCEDURE — 82330 ASSAY OF CALCIUM: CPT | Performed by: STUDENT IN AN ORGANIZED HEALTH CARE EDUCATION/TRAINING PROGRAM

## 2025-05-25 PROCEDURE — 85027 COMPLETE CBC AUTOMATED: CPT | Performed by: STUDENT IN AN ORGANIZED HEALTH CARE EDUCATION/TRAINING PROGRAM

## 2025-05-25 PROCEDURE — 84100 ASSAY OF PHOSPHORUS: CPT | Performed by: STUDENT IN AN ORGANIZED HEALTH CARE EDUCATION/TRAINING PROGRAM

## 2025-05-25 PROCEDURE — 99232 SBSQ HOSP IP/OBS MODERATE 35: CPT | Performed by: INTERNAL MEDICINE

## 2025-05-25 RX ORDER — CALCIUM GLUCONATE 20 MG/ML
2 INJECTION, SOLUTION INTRAVENOUS ONCE
Status: COMPLETED | OUTPATIENT
Start: 2025-05-25 | End: 2025-05-25

## 2025-05-25 RX ORDER — INSULIN ASPART 100 [IU]/ML
5 INJECTION, SUSPENSION SUBCUTANEOUS
Status: DISCONTINUED | OUTPATIENT
Start: 2025-05-25 | End: 2025-06-05

## 2025-05-25 RX ADMIN — HYDRALAZINE HYDROCHLORIDE 10 MG: 20 INJECTION, SOLUTION INTRAMUSCULAR; INTRAVENOUS at 03:18

## 2025-05-25 RX ADMIN — THIAMINE HYDROCHLORIDE: 100 INJECTION, SOLUTION INTRAMUSCULAR; INTRAVENOUS at 13:35

## 2025-05-25 RX ADMIN — AMLODIPINE BESYLATE 5 MG: 5 TABLET ORAL at 21:00

## 2025-05-25 RX ADMIN — HYDRALAZINE HYDROCHLORIDE 25 MG: 25 TABLET ORAL at 21:00

## 2025-05-25 RX ADMIN — INSULIN ASPART 5 UNITS: 100 INJECTION, SUSPENSION SUBCUTANEOUS at 21:54

## 2025-05-25 RX ADMIN — HYDRALAZINE HYDROCHLORIDE 25 MG: 25 TABLET ORAL at 13:35

## 2025-05-25 RX ADMIN — HYDRALAZINE HYDROCHLORIDE 25 MG: 25 TABLET ORAL at 06:11

## 2025-05-25 RX ADMIN — CALCIUM GLUCONATE 2 G: 20 INJECTION, SOLUTION INTRAVENOUS at 06:11

## 2025-05-25 RX ADMIN — AMLODIPINE BESYLATE 5 MG: 5 TABLET ORAL at 08:09

## 2025-05-25 NOTE — ASSESSMENT & PLAN NOTE
Lab Results   Component Value Date    EGFR 43 05/24/2025    EGFR 42 05/24/2025    EGFR 43 05/24/2025    CREATININE 1.72 (H) 05/24/2025    CREATININE 1.78 (H) 05/24/2025    CREATININE 1.72 (H) 05/24/2025   CKD in setting of hypertensive diabetic neuropathy  He has baseline creatinine is around 2 now, from last admission was 1.3-1.5  Consider nephrology consult

## 2025-05-25 NOTE — ASSESSMENT & PLAN NOTE
Lab Results   Component Value Date    HGBA1C 9.8 (H) 02/05/2025       Recent Labs     05/24/25  1216 05/24/25  1646 05/24/25 2019 05/24/25  2210   POCGLU 183* 188* 147* 192*       Blood Sugar Average: Last 72 hrs:  (P) 189.1875    Glucose is up to 758  But not in DKA  Continue Isolyte at 125 ml/hr for now until taking po   DC insulin drip to ISS   Start meal time insulin when PO diet is ordered

## 2025-05-25 NOTE — NURSING NOTE
Received patient from ICU. No changes in patient status since previous assessment. Patient resting in bed in no apparent distress.    Flash Reis 5/25/2025 4:00 PM

## 2025-05-25 NOTE — PLAN OF CARE
Problem: PAIN - ADULT  Goal: Verbalizes/displays adequate comfort level or baseline comfort level  Description: Interventions:  - Encourage patient to monitor pain and request assistance  - Assess pain using appropriate pain scale  - Administer analgesics as ordered based on type and severity of pain and evaluate response  - Implement non-pharmacological measures as appropriate and evaluate response  - Consider cultural and social influences on pain and pain management  - Notify physician/advanced practitioner if interventions unsuccessful or patient reports new pain  - Educate patient/family on pain management process including their role and importance of  reporting pain   - Provide non-pharmacologic/complimentary pain relief interventions  Outcome: Not Progressing     Problem: SAFETY ADULT  Goal: Maintains/Returns to pre admission functional level  Description: INTERVENTIONS:  - Perform AM-PAC 6 Click Basic Mobility/ Daily Activity assessment daily.  - Set and communicate daily mobility goal to care team and patient/family/caregiver.   - Collaborate with rehabilitation services on mobility goals if consulted  - Perform Range of Motion  - Reposition patient every 2 hours.  - - Record patient progress and toleration of activity level   Outcome: Not Progressing     Problem: NEUROSENSORY - ADULT  Goal: Achieves stable or improved neurological status  Description: INTERVENTIONS  - Monitor and report changes in neurological status  - Monitor vital signs such as temperature, blood pressure, glucose, and any other labs ordered   - Initiate measures to prevent increased intracranial pressure  - Monitor for seizure activity and implement precautions if appropriate      Outcome: Not Progressing     Problem: CARDIOVASCULAR - ADULT  Goal: Maintains optimal cardiac output and hemodynamic stability  Description: INTERVENTIONS:  - Monitor I/O, vital signs and rhythm  - Monitor for S/S and trends of decreased cardiac output  -  Administer and titrate ordered vasoactive medications to optimize hemodynamic stability  - Assess quality of pulses, skin color and temperature  - Assess for signs of decreased coronary artery perfusion  - Instruct patient to report change in severity of symptoms  Outcome: Not Progressing     Problem: RESPIRATORY - ADULT  Goal: Achieves optimal ventilation and oxygenation  Description: INTERVENTIONS:  - Assess for changes in respiratory status  - Assess for changes in mentation and behavior  - Position to facilitate oxygenation and minimize respiratory effort  - Oxygen administered by appropriate delivery if ordered  - Initiate smoking cessation education as indicated  - Encourage broncho-pulmonary hygiene including cough, deep breathe, Incentive Spirometry  - Assess the need for suctioning and aspirate as needed  - Assess and instruct to report SOB or any respiratory difficulty  - Respiratory Therapy support as indicated  Outcome: Not Progressing     Problem: GASTROINTESTINAL - ADULT  Goal: Maintains or returns to baseline bowel function  Description: INTERVENTIONS:  - Assess bowel function  - Encourage oral fluids to ensure adequate hydration  - Administer IV fluids if ordered to ensure adequate hydration  - Administer ordered medications as needed  - Encourage mobilization and activity  - Consider nutritional services referral to assist patient with adequate nutrition and appropriate food choices  Outcome: Not Progressing     Problem: METABOLIC, FLUID AND ELECTROLYTES - ADULT  Goal: Electrolytes maintained within normal limits  Description: INTERVENTIONS:  - Monitor labs and assess patient for signs and symptoms of electrolyte imbalances  - Administer electrolyte replacement as ordered  - Monitor response to electrolyte replacements, including repeat lab results as appropriate  - Instruct patient on fluid and nutrition as appropriate  Outcome: Not Progressing     Problem: SAFETY,RESTRAINT: NV/NON-SELF DESTRUCTIVE  BEHAVIOR  Goal: Returns to optimal restraint-free functioning  Description: INTERVENTIONS:  - Assess the patient's behavior and symptoms that indicate continued need for restraint  - Identify and implement measures to help patient regain control  - Assess readiness for release of restraint   Outcome: Not Progressing

## 2025-05-25 NOTE — UTILIZATION REVIEW
Initial Clinical Review    Admission: Date/Time/Statement:   Admission Orders (From admission, onward)       Ordered        05/23/25 2040  INPATIENT ADMISSION  Once                          Orders Placed This Encounter   Procedures    INPATIENT ADMISSION     Standing Status:   Standing     Number of Occurrences:   1     Level of Care:   Level 1 Stepdown [13]     Estimated length of stay:   More than 2 Midnights     Certification:   I certify that inpatient services are medically necessary for this patient for a duration of greater than two midnights. See H&P and MD Progress Notes for additional information about the patient's course of treatment.     ED Arrival Information       Expected   -    Arrival   5/23/2025 17:56    Acuity   Emergent              Means of arrival   Ambulance    Escorted by   [facility] ([facility])    Service   Critical Care/ICU    Admission type   Emergency              Arrival complaint   hyperglycemia             Chief Complaint   Patient presents with    Hyperglycemia - Symptomatic     Patient arrives via EMS after being found down with AMS. Patient shouting during triage.        Initial Presentation: 55 y.o. male w/ PMH of alcohol abuse, chronic pancreatitis, diabetes, A-fib, hypertension and noncompliance w/ meds presented to the ED from home via EMS after he was found down w/ AMS.   In the ED, pt was confused, very agitated and flailing, received Haldol and Ativan 2 mg IV.  /102, RR 24, . Glucose > 600. INR 3.1. Total bilirubin at 4.18, direct at 2.5. Phosphatase of 1069. AST 48 ALT 51. Creatinine 1.99, 1.3-1.5 last admission. CT of the head was negative. Ammonia wnl.   Also received 1L IVF bolus, IV Labetalol, started on Insulin gtt.    Admit as Inpatient for evaluation and treatment of toxic metabolic encephalopathy, alc abuse, alc w/drawal, DM2 w/ hyperglycemia, acute liver failure. HTN.  Plan: Check TSH folate and thiamine. Start on banana bag  give IV  hydralazine 10 mg every 6 prn for SBP >160. Continue Isolyte at 250 ml/hr for now. Continue insulin drip.   Blood glucose every hour until glucose is less than 400.   start thiamine folic acid and multivitamin. If needed phenobarbital for withdrawal. Urinary drug screen. Ultrasound of the upper quadrant for liver     Date: 05/24   Day 2: Pt continues to be altered, started getting agitated received 260 mg of IV phenobarb with good result. Started on IV CTX for procal of 1.  Redose with phenobarb 65 mg IV x 1 , phenobarb 130 mg IV x 1.    RUQ U/S: heterogenous spleen, unremarkable liver.   Labs:  abnormal LFTs; elevated creatinine at baseline; leukocytosis worse; anemia stable; abnormal INR  Ethanol <10.  Plan: cont. Phenobarbital PRN; can add Serax with taper.   Hydralazine IV PRN.  NPO until mentation improves. Thiamine, Folate; IVF hydration. Trend creatinine. Hold Hep SQ. Accuchecks w/ ISS.    GI Consult: Pt w/ worsened cholestatic liver injury with alkaline phosphatase 1069 and bilirubin 4.18. He has had chronic alkaline phosphatase elevations to varying degrees over the last 2 years. GGT in the past has been elevated consistent with liver etiology.   Check chronic liver disease serologies  Plan: Trend liver tests and INR. Treat alcohol withdrawal and encourage alcohol cessation. Consider diagnostic paracentesis if accessible ascites pocket present.      ED Treatment-Medication Administration from 05/23/2025 1756 to 05/23/2025 2131         Date/Time Order Dose Route Action     05/23/2025 1804 LORazepam (ATIVAN) injection 2 mg 2 mg Intravenous Given     05/23/2025 1920 sodium chloride 0.9 % bolus 1,000 mL 1,000 mL Intravenous New Bag     05/23/2025 1829 haloperidol lactate (HALDOL) injection 5 mg 5 mg Intramuscular Given     05/23/2025 1921 LORazepam (ATIVAN) injection 2 mg 2 mg Intravenous Given     05/23/2025 2019 insulin regular (HumuLIN R,NovoLIN R) 1 Units/mL in sodium chloride 0.9 % 100 mL infusion 6.6  Units/hr Intravenous New Bag     05/23/2025 2130 insulin regular (HumuLIN R,NovoLIN R) 1 Units/mL in sodium chloride 0.9 % 100 mL infusion 13.2 Units/hr Intravenous Rate/Dose Change     05/23/2025 2024 labetalol (NORMODYNE) injection 10 mg 10 mg Intravenous Given            Scheduled Medications:  amLODIPine, 5 mg, Oral, BID  cefTRIAXone, 1,000 mg, Intravenous, Q24H  chlorhexidine, 15 mL, Mouth/Throat, Q12H JAISON  folic acid 1 mg, thiamine (VITAMIN B1) 100 mg in sodium chloride 0.9 % 100 mL IV piggyback, , Intravenous, Q24H  hydrALAZINE, 25 mg, Oral, Q8H JAISON  insulin lispro, 1-5 Units, Subcutaneous, TID AC  insulin lispro, 1-5 Units, Subcutaneous, HS      Continuous IV Infusions:  multi-electrolyte, 100 mL/hr, Intravenous, Continuous  insulin regular (HumuLIN R,NovoLIN R) 1 Units/mL in sodium chloride 0.9 % 100 mL infusion  Rate: 0.1-30 mL/hr Dose: 0.1-30 Units/hr  Freq: Continuous Route: IV  Last Dose: Stopped (05/24/25 0245)  Start: 05/23/25 2010 End: 05/24/25 0230    PRN Meds:  hydrALAZINE, 10 mg, Intravenous, Q4H PRN 05/23 x 1, 05/24 x 3      ED Triage Vitals   Temperature Pulse Respirations Blood Pressure SpO2 Pain Score   05/23/25 1945 05/23/25 1850 05/23/25 1850 05/23/25 1930 05/23/25 1850 05/24/25 0700   97.9 °F (36.6 °C) 105 (!) 24 (!) 205/102 97 % No Pain     Weight (last 2 days)       Date/Time Weight    05/25/25 0600 62.3 (137.35)    05/24/25 0551 55.4 (122.14)    05/24/25 0153 55.4 (122.14)    05/23/25 2138 55.4 (122.14)            Vital Signs (last 3 days)       Date/Time Temp Pulse Resp BP MAP (mmHg) SpO2 O2 Device Patient Position - Orthostatic VS Liliana Coma Scale Score Pain    05/25/25 0800 -- 97 16 152/68 98 -- -- -- -- --    05/25/25 0703 98.1 °F (36.7 °C) -- -- -- -- -- -- -- -- --    05/25/25 0700 -- 94 15 151/69 99 98 % -- -- -- --    05/25/25 0600 -- 94 15 147/74 102 98 % -- -- -- --    05/25/25 0500 -- 98 14 156/72 104 99 % -- -- -- --    05/25/25 0400 -- 97 15 139/78 101 99 % -- -- 15 No  Pain    05/25/25 0300 -- 90 15 -- -- 99 % -- -- -- --    05/25/25 0200 -- 90 16 161/77 111 99 % -- -- -- --    05/25/25 0038 97.9 °F (36.6 °C) 90 15 146/78 -- 100 % -- -- -- --    05/25/25 0000 97.9 °F (36.6 °C) 90 15 146/78 108 100 % None (Room air) -- 12 --    05/24/25 2348 97.9 °F (36.6 °C) 94 16 140/67 -- 99 % -- -- -- --    05/24/25 2300 -- 94 16 140/67 97 99 % -- -- -- --    05/24/25 2225 -- 92 18 159/73 105 99 % None (Room air) -- -- --    05/24/25 2200 -- 92 16 181/78 112 99 % -- -- -- --    05/24/25 2100 -- 95 20 154/80 111 100 % -- -- -- --    05/24/25 2000 -- 100 22 177/84 120 99 % None (Room air) -- 12 --    05/24/25 1928 97.9 °F (36.6 °C) -- -- -- -- -- -- -- -- --    05/24/25 1900 -- 91 16 127/69 93 98 % -- -- -- --    05/24/25 1700 -- 96 19 169/79 114 98 % -- -- -- --    05/24/25 1600 -- 94 17 141/65 94 98 % -- -- 12 --    05/24/25 1513 98.4 °F (36.9 °C) 102 20 165/72 103 99 % -- -- -- --    05/24/25 1500 98.2 °F (36.8 °C) 102 22 189/84 120 99 % -- -- -- --    05/24/25 1400 -- 101 20 173/86 121 99 % -- -- -- --    05/24/25 1300 -- 98 18 144/67 97 97 % -- -- -- --    05/24/25 1200 -- -- -- -- -- -- -- -- 12 --    05/24/25 1100 98.5 °F (36.9 °C) 105 21 151/66 95 99 % -- -- -- --    05/24/25 1000 -- 105 -- 198/86 123 99 % -- -- -- --    05/24/25 0900 -- 97 -- 168/70 100 -- -- -- -- --    05/24/25 0800 -- 103 21 148/76 106 97 % -- -- -- --    05/24/25 0708 -- -- -- -- -- -- None (Room air) -- 12 --    05/24/25 0707 -- -- -- -- -- -- -- -- -- No Pain    05/24/25 0700 98.7 °F (37.1 °C) 101 17 167/79 113 98 % -- -- -- No Pain    05/24/25 0600 -- 113 22 163/74 107 98 % -- -- -- --    05/24/25 0530 -- 97 16 121/65 89 97 % -- -- -- --    05/24/25 0500 -- 109 25 -- -- 98 % -- -- -- --    05/24/25 0430 -- 109 19 169/74 107 99 % -- -- -- --    05/24/25 0400 -- 111 35 168/79 113 99 % -- -- 12 --    05/24/25 0338 97.6 °F (36.4 °C) -- -- -- -- -- -- -- -- --    05/24/25 0330 -- 98 21 208/94 135 99 % -- -- -- --     05/24/25 0300 -- 98 26 176/95 121 98 % -- -- -- --    05/24/25 0230 -- 94 17 162/82 112 99 % -- -- -- --    05/24/25 0200 -- 94 22 183/86 123 98 % -- -- -- --    05/24/25 0153 97.2 °F (36.2 °C) 90 18 138/65 -- -- -- -- -- --    05/24/25 0130 -- 93 25 176/86 121 100 % -- -- -- --    05/24/25 0100 -- 91 23 155/81 111 100 % -- -- -- --    05/24/25 0048 97 °F (36.1 °C) 94 22 183/86 -- 98 % -- -- 12 --    05/24/25 0030 -- 93 22 153/76 108 99 % -- -- -- --    05/24/25 0000 -- 90 18 138/65 93 98 % -- -- -- --    05/23/25 2345 96.9 °F (36.1 °C) 92 19 -- -- 99 % -- -- -- --    05/23/25 2330 -- 96 22 174/80 115 100 % -- -- -- --    05/23/25 2315 -- 92 19 153/70 100 99 % -- -- -- --    05/23/25 2300 -- 96 26 190/85 122 99 % -- -- -- --    05/23/25 2245 -- 96 17 178/90 123 99 % -- -- -- --    05/23/25 2230 -- 94 -- 191/87 125 99 % -- -- -- --    05/23/25 2215 -- 92 33 -- -- 98 % -- -- -- --    05/23/25 2200 -- 88 16 204/101 144 98 % -- -- -- --    05/23/25 2145 -- 87 19 222/105 151 98 % -- -- -- --    05/23/25 2100 -- 87 15 186/93 134 98 % None (Room air) -- -- --    05/23/25 2030 -- 86 14 193/90 129 98 % None (Room air) Lying -- --    05/23/25 1945 97.9 °F (36.6 °C) 98 19 200/111 148 98 % None (Room air) Lying -- --    05/23/25 1930 -- 97 15 205/102 145 98 % None (Room air) Lying -- --    05/23/25 1850 -- 105 24 -- -- 97 % None (Room air) -- -- --              Pertinent Labs/Diagnostic Test Results:   Radiology:  US right upper quadrant   Final Interpretation by Solomon Spears DO (05/23 7316)      Limited exam.      Heterogeneous appearance of the pancreas, correlates with fatty replacement and calcifications seen on prior CT, possibly sequela of chronic inflammation/chronic pancreatitis.      Liver appears grossly unremarkable.      Small volume hypoechoic ascites, and other findings as above.      Workstation performed: [id number]         CT head without contrast   Final Interpretation by Eleuterio Campa MD  (05/23 2103)      No acute intracranial abnormality.                  Workstation performed: [id number]           Cardiology:  ECG 12 lead   Final Result by Will Guajardo DO (05/24 0632)   Sinus tachycardia   Abnormal ECG   When compared with ECG of 26-Apr-2025 16:01,   Borderline criteria for Inferior infarct are no longer Present   Non-specific change in ST segment in Inferior leads   ST no longer depressed in Anterior leads   T wave amplitude has decreased in Lateral leads   Confirmed by Will Guajardo [id number]) on 5/24/2025 6:32:16 AM        GI:  No orders to display           Results from last 7 days   Lab Units 05/25/25  0450 05/24/25 0441 05/23/25  1855   WBC Thousand/uL 19.15* 20.48* 11.35*   HEMOGLOBIN g/dL 9.5* 8.6* 9.7*   HEMATOCRIT % 27.4* 25.5* 28.9*   PLATELETS Thousands/uL 306 278 220   TOTAL NEUT ABS Thousands/µL  --  16.11* 9.42*         Results from last 7 days   Lab Units 05/25/25  0450 05/24/25  1639 05/24/25  0613 05/24/25 0441 05/23/25  2155 05/23/25  1855   SODIUM mmol/L  --  132* 135 134* 128* 125*   POTASSIUM mmol/L  --  4.9 3.8 6.6* 3.4* 3.8   CHLORIDE mmol/L  --  101 104 102 97 92*   CO2 mmol/L  --  22 22 20* 23 23   ANION GAP mmol/L  --  9 9 12 8 10   BUN mg/dL  --  12 12 12 13 12   CREATININE mg/dL  --  1.72* 1.78* 1.72* 1.97* 1.99*   EGFR ml/min/1.73sq m  --  43 42 43 37 36   CALCIUM mg/dL  --  8.4 8.6 8.5 8.2* 8.6   CALCIUM, IONIZED mmol/L 1.04*  --   --   --  1.13  --    MAGNESIUM mg/dL 2.6  --  1.8* 1.8* 1.9  --    PHOSPHORUS mg/dL 4.7*  --  2.5* 9.3* 1.9*  --      Results from last 7 days   Lab Units 05/24/25 0441 05/23/25  1855   AST U/L 43* 48*   ALT U/L 42 51   ALK PHOS U/L 861* 1,069*   TOTAL PROTEIN g/dL 6.0* 6.0*   ALBUMIN g/dL 2.8* 2.8*   TOTAL BILIRUBIN mg/dL 2.93* 4.18*   BILIRUBIN DIRECT mg/dL  --  2.54*   AMMONIA umol/L  --  36     Results from last 7 days   Lab Units 05/25/25  0702 05/24/25  2210 05/24/25  2019 05/24/25  1646 05/24/25  1216 05/24/25  0956 05/24/25  0756  05/24/25  0718 05/24/25  0543 05/24/25  0407 05/24/25  0228 05/24/25  0122   POC GLUCOSE mg/dl 112 192* 147* 188* 183* 148* 113 105 72 166* 289* 237*     Results from last 7 days   Lab Units 05/24/25  1639 05/24/25  0613 05/24/25  0441 05/23/25 2155 05/23/25  1855   GLUCOSE RANDOM mg/dL 188* 41* 110 615* 785*             Beta- Hydroxybutyrate   Date Value Ref Range Status   05/23/2025 0.21 0.20 - 0.60 mmol/L Final   04/26/2025 <0.05 0.02 - 0.27 mmol/L Final   02/05/2025 <0.05 0.02 - 0.27 mmol/L Final      Results from last 7 days   Lab Units 05/23/25 2058   PH ART  7.360   PCO2 ART mm Hg 37.2   PO2 ART mm Hg 107.5   HCO3 ART mmol/L 20.5*   BASE EXC ART mmol/L -4.4   O2 CONTENT ART mL/dL 14.2*   O2 HGB, ARTERIAL % 97.1*   ABG SOURCE  Brachial, Right     Results from last 7 days   Lab Units 05/23/25 1929   PH [place]  7.230*   PCO2 [place] mm Hg 54.8*   PO2 [place] mm Hg 27.7*   HCO3 [place] mmol/L 22.4*   BASE EXC [place] mmol/L -5.3   O2 CONTENT [place] ml/dL 7.2   O2 HGB, VENOUS % 48.2*         Results from last 7 days   Lab Units 05/23/25  1855   CK TOTAL U/L 62     Results from last 7 days   Lab Units 05/24/25  0030 05/23/25 2155 05/23/25  1855   HS TNI 0HR ng/L  --   --  13   HS TNI 2HR ng/L  --  14  --    HSTNI D2 ng/L  --  1  --    HS TNI 4HR ng/L 16  --   --    HSTNI D4 ng/L 3  --   --          Results from last 7 days   Lab Units 05/25/25  0450 05/24/25  0441 05/23/25  1855   PROTIME seconds 38.7* 41.5* 36.1*   INR  4.09* 4.50* 3.73*   PTT seconds  --  39* 30         Results from last 7 days   Lab Units 05/24/25  0441 05/23/25  2155   PROCALCITONIN ng/ml 1.03* 1.09*                     Results from last 7 days   Lab Units 05/24/25  1639   FERRITIN ng/mL 683*   IRON SATURATION % 7*   IRON ug/dL 10*   TIBC ug/dL 142.8*     Results from last 7 days   Lab Units 05/24/25  1639   TRANSFERRIN mg/dL 102*         Results from last 7 days   Lab Units 05/24/25  0441   HEP B S AG  Non-reactive   HEP C AB  Non-reactive   HEP B C IGM   Non-reactive     Results from last 7 days   Lab Units 05/23/25  1855   LIPASE u/L <6*                             Results from last 7 days   Lab Units 05/23/25  2157   AMPH/METH  Negative   BARBITURATE UR  Negative   BENZODIAZEPINE UR  Negative   COCAINE UR  Negative   METHADONE URINE  Negative   OPIATE UR  Negative   PCP UR  Negative   THC UR  Negative     Results from last 7 days   Lab Units 05/23/25  1855   ETHANOL LVL mg/dL <10   ACETAMINOPHEN LVL ug/mL <2*   SALICYLATE LVL mg/dL <5*                                   Past Medical History[1]  Present on Admission:   Primary hypertension   Alcohol abuse   Hyponatremia   CKD stage 3a, GFR 45-59 ml/min (HCC)   Acute liver failure   Toxic metabolic encephalopathy   Electrolyte abnormality      Admitting Diagnosis: Alcohol abuse [F10.10]  DKA (diabetic ketoacidosis) (HCC) [E11.10]  Transaminitis [R74.01]  Hyperglycemia [R73.9]  Uncontrolled hypertension [I10]  Acute encephalopathy [G93.40]  Age/Sex: 55 y.o. male    Network Utilization Review Department  ATTENTION: Please call with any questions or concerns to [phone] and carefully listen to the prompts so that you are directed to the right person. All voicemails are confidential.   For Discharge needs, contact Care Management DC Support Team at [phone] opt. [phone]  Send all requests for admission clinical reviews, approved or denied determinations and any other requests to dedicated fax number below belonging to the campus where the patient is receiving treatment. List of dedicated fax numbers for the Facilities:  FACILITY NAME UR FAX NUMBER   ADMISSION DENIALS (Administrative/Medical Necessity) [phone]   DISCHARGE SUPPORT TEAM (NETWORK) [phone]   PARENT CHILD HEALTH (Maternity/NICU/Pediatrics) [phone]   [facility] [phone]   [facility] [phone]   [facility] [phone]   [facility]  [phone]   [facility] [phone]   [facility] [phone]   [facility] [phone]   [facility] [phone]   [facility] [phone]   [facility] [phone]   [facility] [phone]   [facility] [phone]              [1]   Past Medical History:  Diagnosis Date    Alcohol abuse     Chronic pancreatitis (HCC)     Diabetes mellitus (HCC)     Type 2    Non compliance w medication regimen     Pancreatitis     Paroxysmal A-fib (HCC)     Thrombocytopenia (HCC)

## 2025-05-25 NOTE — PLAN OF CARE
Problem: PAIN - ADULT  Goal: Verbalizes/displays adequate comfort level or baseline comfort level  Description: Interventions:  - Encourage patient to monitor pain and request assistance  - Assess pain using appropriate pain scale  - Administer analgesics as ordered based on type and severity of pain and evaluate response  - Implement non-pharmacological measures as appropriate and evaluate response  - Consider cultural and social influences on pain and pain management  - Notify physician/advanced practitioner if interventions unsuccessful or patient reports new pain  - Educate patient/family on pain management process including their role and importance of  reporting pain   - Provide non-pharmacologic/complimentary pain relief interventions  Outcome: Progressing     Problem: INFECTION - ADULT  Goal: Absence or prevention of progression during hospitalization  Description: INTERVENTIONS:  - Assess and monitor for signs and symptoms of infection  - Monitor lab/diagnostic results  - Monitor all insertion sites, i.e. indwelling lines, tubes, and drains  - Monitor endotracheal if appropriate and nasal secretions for changes in amount and color  - [place] appropriate cooling/warming therapies per order  - Administer medications as ordered  - Instruct and encourage patient and family to use good hand hygiene technique  - Identify and instruct in appropriate isolation precautions for identified infection/condition  Outcome: Progressing  Goal: Absence of fever/infection during neutropenic period  Description: INTERVENTIONS:  - Monitor WBC  - Perform strict hand hygiene  - Limit to healthy visitors only  - No plants, dried, fresh or silk flowers with m[place]cl[place] in patient room  Outcome: Progressing     Problem: SAFETY ADULT  Goal: Patient will remain free of falls  Description: INTERVENTIONS:  - Educate patient/family on patient safety including physical limitations  - Instruct patient to call for assistance with activity   -  Consider consulting OT/PT to assist with strengthening/mobility based on AM PAC & JH-HLM score  - Consult OT/PT to assist with strengthening/mobility   - Keep Call bell within reach  - Keep bed low and locked with side rails adjusted as appropriate  - Keep care items and personal belongings within reach  - Initiate and maintain comfort rounds  - Make Fall Risk Sign visible to staff  - Offer Toileting every 2 Hours, in advance of need  - Initiate/Maintain bed alarm  - Obtain necessary fall risk management equipment  - Apply yellow socks and bracelet for high fall risk patients  - Consider moving patient to room near nurses station  Outcome: Progressing  Goal: Maintain or return to baseline ADL function  Description: INTERVENTIONS:  -  Assess patient's ability to carry out ADLs; assess patient's baseline for ADL function and identify physical deficits which impact ability to perform ADLs (bathing, care of mouth/teeth, toileting, grooming, dressing, etc.)  - Assess/evaluate cause of self-care deficits   - Assess range of motion  - Assess patient's mobility; develop plan if impaired  - Assess patient's need for assistive devices and provide as appropriate  - Encourage maximum independence but intervene and supervise when necessary  - Involve family in performance of ADLs  - Assess for home care needs following discharge   - Consider OT consult to assist with ADL evaluation and planning for discharge  - Provide patient education as appropriate  - Monitor functional capacity and physical performance, use of AM PAC & JH-HLM   - Monitor gait, balance and fatigue with ambulation    Outcome: Progressing  Goal: Maintains/Returns to pre admission functional level  Description: INTERVENTIONS:  - Perform AM-PAC 6 Click Basic Mobility/ Daily Activity assessment daily.  - Set and communicate daily mobility goal to care team and patient/family/caregiver.   - Collaborate with rehabilitation services on mobility goals if consulted  -  Perform Range of Motion 4 times a day.  - Reposition patient every 2 hours.  - Dangle patient 1 times a day  - Stand patient 1 times a day  - Ambulate patient 1 times a day  - Out of bed to chair 1 times a day   - Out of bed for meals 1 times a day  - Out of bed for toileting  - Record patient progress and toleration of activity level   Outcome: Progressing     Problem: DISCHARGE PLANNING  Goal: Discharge to home or other facility with appropriate resources  Description: INTERVENTIONS:  - Identify barriers to discharge w/patient and caregiver  - Arrange for needed discharge resources and transportation as appropriate  - Identify discharge learning needs (meds, wound care, etc.)  - Arrange for interpretive services to assist at discharge as needed  - Refer to Case Management Department for coordinating discharge planning if the patient needs post-hospital services based on physician/advanced practitioner order or complex needs related to functional status, cognitive ability, or social support system  Outcome: Progressing     Problem: Knowledge Deficit  Goal: Patient/family/caregiver demonstrates understanding of disease process, treatment plan, medications, and discharge instructions  Description: Complete learning assessment and assess knowledge base.  Interventions:  - Provide teaching at level of understanding  - Provide teaching via preferred learning methods  Outcome: Progressing     Problem: NEUROSENSORY - ADULT  Goal: Achieves stable or improved neurological status  Description: INTERVENTIONS  - Monitor and report changes in neurological status  - Monitor vital signs such as temperature, blood pressure, glucose, and any other labs ordered   - Initiate measures to prevent increased intracranial pressure  - Monitor for seizure activity and implement precautions if appropriate      Outcome: Progressing  Goal: Remains free of injury related to seizures activity  Description: INTERVENTIONS  - Maintain airway,  patient safety  and administer oxygen as ordered  - Monitor patient for seizure activity, document and report duration and description of seizure to physician/advanced practitioner  - If seizure occurs,  ensure patient safety during seizure  - Reorient patient post seizure  - Seizure pads on all 4 side rails  - Instruct patient/family to notify RN of any seizure activity including if an aura is experienced  - Instruct patient/family to call for assistance with activity based on nursing assessment  - Administer anti-seizure medications if ordered    Outcome: Progressing  Goal: Achieves maximal functionality and self care  Description: INTERVENTIONS  - Monitor swallowing and airway patency with patient fatigue and changes in neurological status  - Encourage and assist patient to increase activity and self care.   - Encourage visually impaired, hearing impaired and aphasic patients to use assistive/communication devices  Outcome: Progressing     Problem: CARDIOVASCULAR - ADULT  Goal: Maintains optimal cardiac output and hemodynamic stability  Description: INTERVENTIONS:  - Monitor I/O, vital signs and rhythm  - Monitor for S/S and trends of decreased cardiac output  - Administer and titrate ordered vasoactive medications to optimize hemodynamic stability  - Assess quality of pulses, skin color and temperature  - Assess for signs of decreased coronary artery perfusion  - Instruct patient to report change in severity of symptoms  Outcome: Progressing  Goal: Absence of cardiac dysrhythmias or at baseline rhythm  Description: INTERVENTIONS:  - Continuous cardiac monitoring, vital signs, obtain 12 lead EKG if ordered  - Administer antiarrhythmic and heart rate control medications as ordered  - Monitor electrolytes and administer replacement therapy as ordered  Outcome: Progressing     Problem: RESPIRATORY - ADULT  Goal: Achieves optimal ventilation and oxygenation  Description: INTERVENTIONS:  - Assess for changes in  respiratory status  - Assess for changes in mentation and behavior  - Position to facilitate oxygenation and minimize respiratory effort  - Oxygen administered by appropriate delivery if ordered  - Initiate smoking cessation education as indicated  - Encourage broncho-pulmonary hygiene including cough, deep breathe, Incentive Spirometry  - Assess the need for suctioning and aspirate as needed  - Assess and instruct to report SOB or any respiratory difficulty  - Respiratory Therapy support as indicated  Outcome: Progressing     Problem: GASTROINTESTINAL - ADULT  Goal: Minimal or absence of nausea and/or vomiting  Description: INTERVENTIONS:  - Administer IV fluids if ordered to ensure adequate hydration  - Maintain NPO status until nausea and vomiting are resolved  - Nasogastric tube if ordered  - Administer ordered antiemetic medications as needed  - Provide nonpharmacologic comfort measures as appropriate  - Advance diet as tolerated, if ordered  - Consider nutrition services referral to assist patient with adequate nutrition and appropriate food choices  Outcome: Progressing  Goal: Maintains or returns to baseline bowel function  Description: INTERVENTIONS:  - Assess bowel function  - Encourage oral fluids to ensure adequate hydration  - Administer IV fluids if ordered to ensure adequate hydration  - Administer ordered medications as needed  - Encourage mobilization and activity  - Consider nutritional services referral to assist patient with adequate nutrition and appropriate food choices  Outcome: Progressing  Goal: Maintains adequate nutritional intake  Description: INTERVENTIONS:  - Monitor percentage of each meal consumed  - Identify factors contributing to decreased intake, treat as appropriate  - Assist with meals as needed  - Monitor I&O, weight, and lab values if indicated  - Obtain nutrition services referral as needed  Outcome: Progressing  Goal: Establish and maintain optimal ostomy  function  Description: INTERVENTIONS:  - Assess bowel function  - Encourage oral fluids to ensure adequate hydration  - Administer IV fluids if ordered to ensure adequate hydration   - Administer ordered medications as needed  - Encourage mobilization and activity  - Nutrition services referral to assist patient with appropriate food choices  - Assess stoma site  - Consider wound care consult   Outcome: Progressing  Goal: Oral mucous membranes remain intact  Description: INTERVENTIONS  - Assess oral mucosa and hygiene practices  - Implement preventative oral hygiene regimen  - Implement oral medicated treatments as ordered  - Initiate Nutrition services referral as needed  Outcome: Progressing     Problem: GENITOURINARY - ADULT  Goal: Maintains or returns to baseline urinary function  Description: INTERVENTIONS:  - Assess urinary function  - Encourage oral fluids to ensure adequate hydration if ordered  - Administer IV fluids as ordered to ensure adequate hydration  - Administer ordered medications as needed  - Offer frequent toileting  - Follow urinary retention protocol if ordered  Outcome: Progressing  Goal: Absence of urinary retention  Description: INTERVENTIONS:  - Assess patient’s ability to void and empty bladder  - Monitor I/O  - Bladder scan as needed  - Discuss with physician/AP medications to alleviate retention as needed  - Discuss catheterization for long term situations as appropriate  Outcome: Progressing  Goal: Urinary catheter remains patent  Description: INTERVENTIONS:  - Assess patency of urinary catheter  - If patient has a chronic ibrahim, consider changing catheter if non-functioning  - Follow guidelines for intermittent irrigation of non-functioning urinary catheter  Outcome: Progressing     Problem: METABOLIC, FLUID AND ELECTROLYTES - ADULT  Goal: Electrolytes maintained within normal limits  Description: INTERVENTIONS:  - Monitor labs and assess patient for signs and symptoms of electrolyte  imbalances  - Administer electrolyte replacement as ordered  - Monitor response to electrolyte replacements, including repeat lab results as appropriate  - Instruct patient on fluid and nutrition as appropriate  Outcome: Progressing  Goal: Fluid balance maintained  Description: INTERVENTIONS:  - Monitor labs   - Monitor I/O and WT  - Instruct patient on fluid and nutrition as appropriate  - Assess for signs & symptoms of volume excess or deficit  Outcome: Progressing  Goal: Glucose maintained within target range  Description: INTERVENTIONS:  - Monitor Blood Glucose as ordered  - Assess for signs and symptoms of hyperglycemia and hypoglycemia  - Administer ordered medications to maintain glucose within target range  - Assess nutritional intake and initiate nutrition service referral as needed  Outcome: Progressing     Problem: SKIN/TISSUE INTEGRITY - ADULT  Goal: Skin Integrity remains intact(Skin Breakdown Prevention)  Description: Assess:  -Perform Jose assessment every shift  -Clean and moisturize skin every day  -Inspect skin when repositioning, toileting, and assisting with ADLS  -Assess under medical devices such as bp cuff every 8hr  -Assess extremities for adequate circulation and sensation     Bed Management:  -Have minimal linens on bed & keep smooth, unwrinkled  -Change linens as needed when moist or perspiring  -Avoid sitting or lying in one position for more than 2 hours while in bed      Toileting:  -Offer bedside commode  -Assess for incontinence every 2  -Use incontinent care products after each incontinent episode    Activity:  -Mobilize patient 12 times a day  -Encourage activity and walks on unit  -Encourage or provide ROM exercises   -Turn and reposition patient every 2 Hours  -Use appropriate equipment to lift or move patient in bed  -Instruct/ Assist with weight shifting every ` when out of bed in chair  -Consider limitation of chair time 4 hour intervals    Skin Care:  -Avoid use of baby  powder, tape, friction and shearing, hot water or constrictive clothing  -Relieve pressure over bony prominences using pillow  -Do not massage red bony areas    Next Steps:  -Teach patient strategies to minimize risk   -Consider consults to  interdisciplinary teams such as pt/ot  Outcome: Progressing  Goal: Incision(s), wounds(s) or drain site(s) healing without S/S of infection  Description: INTERVENTIONS  - Assess and document dressing, incision, wound bed, drain sites and surrounding tissue  - Provide patient and family education  - Perform skin care/dressing changes every day  Outcome: Progressing  Goal: Pressure injury heals and does not worsen  Description: Interventions:  - Implement low air loss mattress or specialty surface (Criteria met)  - Apply silicone foam dressing  - Instruct/assist with weight shifting every 120 minutes when in chair   - Limit chair time to 4 hour intervals  - Use special pressure reducing interventions such as pillow when in chair   - Apply fecal or urinary incontinence containment device   - Perform passive or active ROM every 4 hr  - Turn and reposition patient & offload bony prominences every 2 hours   - Utilize friction reducing device or surface for transfers   - Consider consults to  interdisciplinary teams such as pt ot  - Use incontinent care products after each incontinent episode such as   - Consider nutrition services referral as needed  Outcome: Progressing     Problem: HEMATOLOGIC - ADULT  Goal: Maintains hematologic stability  Description: INTERVENTIONS  - Assess for signs and symptoms of bleeding or hemorrhage  - Monitor labs  - Administer supportive blood products/factors as ordered and appropriate  Outcome: Progressing     Problem: MUSCULOSKELETAL - ADULT  Goal: Maintain or return mobility to safest level of function  Description: INTERVENTIONS:  - Assess patient's ability to carry out ADLs; assess patient's baseline for ADL function and identify physical deficits  which impact ability to perform ADLs (bathing, care of mouth/teeth, toileting, grooming, dressing, etc.)  - Assess/evaluate cause of self-care deficits   - Assess range of motion  - Assess patient's mobility  - Assess patient's need for assistive devices and provide as appropriate  - Encourage maximum independence but intervene and supervise when necessary  - Involve family in performance of ADLs  - Assess for home care needs following discharge   - Consider OT consult to assist with ADL evaluation and planning for discharge  - Provide patient education as appropriate  Outcome: Progressing  Goal: Maintain proper alignment of affected body part  Description: INTERVENTIONS:  - Support, maintain and protect limb and body alignment  - Provide patient/ family with appropriate education  Outcome: Progressing     Problem: SAFETY,RESTRAINT: NV/NON-SELF DESTRUCTIVE BEHAVIOR  Goal: Remains free of harm/injury (restraint for non violent/non self-detsructive behavior)  Description: INTERVENTIONS:  - Instruct patient/family regarding restraint use   - Assess and monitor physiologic and psychological status   - Provide interventions and comfort measures to meet assessed patient needs   - Identify and implement measures to help patient regain control  - Assess readiness for release of restraint   Outcome: Progressing  Goal: Returns to optimal restraint-free functioning  Description: INTERVENTIONS:  - Assess the patient's behavior and symptoms that indicate continued need for restraint  - Identify and implement measures to help patient regain control  - Assess readiness for release of restraint   Outcome: Progressing     Problem: Prexisting or High Potential for Compromised Skin Integrity  Goal: Skin integrity is maintained or improved  Description: INTERVENTIONS:  - Identify patients at risk for skin breakdown  - Assess and monitor skin integrity including under and around medical devices   - Assess and monitor nutrition and  hydration status  - Monitor labs  - Assess for incontinence   - Turn and reposition patient  - Assist with mobility/ambulation  - Relieve pressure over keith prominences   - Avoid friction and shearing  - Provide appropriate hygiene as needed including keeping skin clean and dry  - Evaluate need for skin moisturizer/barrier cream  - Collaborate with interdisciplinary team  - Patient/family teaching  - Consider wound care consult    A

## 2025-05-25 NOTE — NURSING NOTE
"Patient sleeping at time and when awoken for medications, stated he wanted to take them \"later\" despite RN attempting to administer now. Patient not eating dinner at time so will inform oncoming RN to administer insulin with meal when/if he eats dinner.    Flash Reis 5/25/2025 6:17 PM   "

## 2025-05-25 NOTE — QUICK NOTE
Progress Note - Critical Care/ICU   Name: Wes Araujo 55 y.o. male I MRN: [id number]  Unit/Bed#: ICU 06 I Date of Admission: 5/23/2025   Date of Service: 5/25/2025 I Hospital Day: 2       Critical Care Interval Transfer Note:    Brief Hospital Summary: Patient is a 55-year-old male with a past medical history of alcohol abuse, chronic pancreatitis, diabetes, A-fib, hypertension, and noncompliance.  He presented to the ER on 5/23 after being found down with altered mental status.  He was very agitated and flailing after arrival to the emergency room.  He was admitted to the ICU for phenobarb in the setting of possible alcohol withdrawal and was found to be in acute liver failure.  He did receive phenobarb with his last dose being 5/24 at 4 AM.  His INR continues to be elevated, however this morning, the patient has been hemodynamically stable, alert and oriented x 4.  He is tolerating p.o.    Barriers to discharge:   GI following  Recheck INR/hepatic function in a.m.     Consults: IP CONSULT TO CASE MANAGEMENT  IP CONSULT TO GASTROENTEROLOGY    Recommended to review admission imaging for incidental findings and document in discharge navigator: Chart reviewed, no known incidental findings noted at this time.      Discharge Plan: Anticipate discharge in 48-72 hrs to home.      Patient seen and evaluated by Critical Care today and deemed to be appropriate for transfer to Med Surg. Spoke to Dr. Juan from [facility] to accept transfer. Critical care can be contacted via SecureChat with any questions or concerns. Please use the Critical Care AP Role in Secure Chat for any provider inquires until the patient is transferred out of the ICU or until tomorrow at 0600.

## 2025-05-25 NOTE — ASSESSMENT & PLAN NOTE
Patient appears to be in acute liver failure not on anticoagulation and an INR of 3.1 now up to 4.1 consider giving vit K   Total bilirubin at 4.18, direct at 2.5  : Phosphatase of 1069  AST 48 ALT 51  Platelet count normal at 220  Continue to monitor endpoint  Hepatitis panel - pending   Ultrasound of the upper quadrant for liver - Heterogeneous appearance of the pancreas, correlates with fatty replacement and calcifications seen on prior CT, possibly sequela of chronic inflammation/chronic pancreatitis.Liver appears grossly unremarkable.Small volume hypoechoic ascites, and other findings as above.  GI is following

## 2025-05-25 NOTE — NURSING NOTE
Pt downgraded to medicine. Pt condition and vs stable for transfer. Report given to receiving rn sanjana. No outstanding orders. Pt assisted via stand and pivot to wheelchair. Belongings moved with pt. Trasnferred by rn to [facility] 2 [id number]. Upon arrival pt assisted via stand and pivot to new bed. Belongings placed at bedside, call bell handed to patient, bed locked and lowered with x2 side rails up, bed alarm turned on. Transfer of care completed

## 2025-05-25 NOTE — ASSESSMENT & PLAN NOTE
Patient came into the ER, very agitated did not follow commands unable to communicate  ammonia was within normal limits, BUN was also within normal limits  CT of the head was negative  Start on banana bag  Most likely in the setting of hyperglycemia resolved   For alcohol withdrawal on pheno javed   Silva catheter in place  GI consulted for liver

## 2025-05-25 NOTE — PROGRESS NOTES
Progress Note - Critical Care/ICU   Name: Wes Araujo 55 y.o. male I MRN: [id number]  Unit/Bed#: ICU 06 I Date of Admission: 5/23/2025   Date of Service: 5/25/2025 I Hospital Day: 2      Assessment & Plan  Primary hypertension  Presents with marked elevated blood pressure  noncompliant with medication  hydralazine 25 mg every 8, amlodipine 5 mg daily  Will give IV hydralazine 10 mg every 6 as needed pressures greater than 160  Type 2 diabetes mellitus with hyperglycemia, with long-term current use of insulin ([facility])  Lab Results   Component Value Date    HGBA1C 9.8 (H) 02/05/2025       Recent Labs     05/24/25  1216 05/24/25  1646 05/24/25 2019 05/24/25  2210   POCGLU 183* 188* 147* 192*       Blood Sugar Average: Last 72 hrs:  (P) 189.1875    Glucose is up to 758  But not in DKA  Continue Isolyte at 125 ml/hr for now until taking po   DC insulin drip to ISS   Start meal time insulin when PO diet is ordered   Alcohol abuse  Patient has a history of alcohol use disorder with a history of withdrawal and withdrawal seizures  His alcohol is negative  Will start thiamine folic acid and multivitamin  Severely altered with altered mental status appears to be in liver failure  If needed phenobarbital for withdrawal  Urinary drug screen - neg   Hyponatremia  Pseudohyponatremia in setting of hyperglycemia fluids history of chronic hyponatremia secondary to excessive free water due to alcohol  Monitor closely  History of atrial fibrillation  Patient has a history of paroxysmal A-fib he is not on anticoagulation  Controlled with metoprolol  Noncompliance with medication  Toxic metabolic encephalopathy  Patient came into the ER, very agitated did not follow commands unable to communicate  ammonia was within normal limits, BUN was also within normal limits  CT of the head was negative  Start on banana bag  Most likely in the setting of hyperglycemia resolved   For alcohol withdrawal on pheno javed   Silva catheter in place  GI  consulted for liver   CKD stage 3a, GFR 45-59 ml/min ([facility])  Lab Results   Component Value Date    EGFR 43 05/24/2025    EGFR 42 05/24/2025    EGFR 43 05/24/2025    CREATININE 1.72 (H) 05/24/2025    CREATININE 1.78 (H) 05/24/2025    CREATININE 1.72 (H) 05/24/2025   CKD in setting of hypertensive diabetic neuropathy  He has baseline creatinine is around 2 now, from last admission was 1.3-1.5  Consider nephrology consult    Acute liver failure  Patient appears to be in acute liver failure not on anticoagulation and an INR of 3.1 now up to 4.1 consider giving vit K   Total bilirubin at 4.18, direct at 2.5  : Phosphatase of 1069  AST 48 ALT 51  Platelet count normal at 220  Continue to monitor endpoint  Hepatitis panel - pending   Ultrasound of the upper quadrant for liver - Heterogeneous appearance of the pancreas, correlates with fatty replacement and calcifications seen on prior CT, possibly sequela of chronic inflammation/chronic pancreatitis.Liver appears grossly unremarkable.Small volume hypoechoic ascites, and other findings as above.  GI is following   Electrolyte abnormality   magnesium to greater than 2.2 potassium greater than 4.2 and calcium greater than 1.2 ionized  Disposition: Stepdown Level 1    ICU Core Measures     A: Assess, Prevent, and Manage Pain Has pain been assessed? N/A  Need for changes to pain regimen? N/A   B: Both SAT/SAT  N/A   C: Choice of Sedation RASS Goal: 0 Alert and Calm or N/A patient not on sedation  Need for changes to sedation or analgesia regimen? N/A   D: Delirium CAM-ICU: Unable to perform secondary to Acute cognitive dysfunction   E: Early Mobility  Plan for early mobility? Yes   F: Family Engagement Plan for family engagement today? Yes       Antibiotic Review: Awaiting culture results.     Review of Invasive Devices:    Silva Plan: Silva to be removed. Order has been placed        Prophylaxis:  VTE VTE covered by:    None    Contraindicated secondary to: inr elevated     Stress Ulcer  not ordered         24 Hour Events :     Patient was switched to subcu insulin  He is still sleepy and pulling on IVs still in hand wrist restraints  Probably can be downgraded to stepdown two did not need any phenobarb since yesterday  INR up to 4.0 - Vit K ?     Subjective   Review of Systems: Review of Systems not obtainable due to Altered mental status    Objective :                   Vitals I/O      Most Recent Min/Max in 24hrs   Temp 97.9 °F (36.6 °C) Temp  Min: 97.9 °F (36.6 °C)  Max: 98.7 °F (37.1 °C)   Pulse 90 Pulse  Min: 90  Max: 113   Resp 15 Resp  Min: 15  Max: 25   /78 BP  Min: 121/65  Max: 198/86   O2 Sat 100 % SpO2  Min: 97 %  Max: 100 %      Intake/Output Summary (Last 24 hours) at 5/25/2025 0429  Last data filed at 5/24/2025 1657  Gross per 24 hour   Intake 2842.91 ml   Output 940 ml   Net 1902.91 ml       Diet NPO    Invasive Monitoring           Physical Exam   Physical Exam  Vitals and nursing note reviewed.   Eyes:      General: Scleral icterus present.      Extraocular Movements: Extraocular movements intact.      Pupils: Pupils are equal, round, and reactive to light.   Skin:     General: Skin is warm, dry and not mottled extremities.      Capillary Refill: Capillary refill takes less than 2 seconds.      Coloration: Skin is jaundiced. Skin is not pale.   HENT:      Head: Normocephalic and atraumatic.   Cardiovascular:      Rate and Rhythm: Normal rate and regular rhythm.      Pulses: Normal pulses.      Heart sounds: Normal heart sounds.   Musculoskeletal:         General: Normal range of motion.      Cervical back: Full passive range of motion without pain, normal range of motion and neck supple.   Abdominal: General: Bowel sounds are normal.      Palpations: Abdomen is soft.   Constitutional:       Appearance: He is well-developed and well-nourished. He is cachectic. He is ill-appearing.   Pulmonary:      Effort: Pulmonary effort is normal.      Breath sounds: Normal  breath sounds. No wheezing or rhonchi.   Neurological:      Mental Status: He is easily aroused. He is lethargic, calm, disoriented to person, disoriented to place, disoriented to time and disoriented to situation.   Genitourinary/Anorectal:  Silva present.        Diagnostic Studies        Lab Results: I have reviewed the following results:     Medications:  Scheduled PRN   amLODIPine, 5 mg, BID  cefTRIAXone, 1,000 mg, Q24H  chlorhexidine, 15 mL, Q12H JAISON  folic acid 1 mg, thiamine (VITAMIN B1) 100 mg in sodium chloride 0.9 % 100 mL IV piggyback, , Q24H  hydrALAZINE, 25 mg, Q8H JAISON  insulin lispro, 1-5 Units, TID AC  insulin lispro, 1-5 Units, HS      hydrALAZINE, 10 mg, Q4H PRN       Continuous    multi-electrolyte, 250 mL/hr, Last Rate: 250 mL/hr (05/24/25 1828)         Labs:   CBC    Recent Labs     05/23/25 1855 05/24/25 0441   WBC 11.35* 20.48*   HGB 9.7* 8.6*   HCT 28.9* 25.5*    278     BMP    Recent Labs     05/24/25  0613 05/24/25  1639   SODIUM 135 132*   K 3.8 4.9    101   CO2 22 22   AGAP 9 9   BUN 12 12   CREATININE 1.78* 1.72*   CALCIUM 8.6 8.4       Coags    Recent Labs     05/23/25 1855 05/24/25 0441   INR 3.73* 4.50*   PTT 30 39*        Additional Electrolytes  Recent Labs     05/23/25 2155 05/24/25 0441 05/24/25  0613   MG 1.9 1.8* 1.8*   PHOS 1.9* 9.3* 2.5*   CAIONIZED 1.13  --   --           Blood Gas    Recent Labs     05/23/25 2058   PHART 7.360   ZIC9NYB 37.2   PO2ART 107.5   [id number] 20.5*   BEART -4.4   SOURCE Brachial, Right     Recent Labs     05/23/25  1929 05/23/25 2058   PHVEN 7.230*  --    ESP6KVS 54.8*  --    PO2VEN 27.7*  --    NDB5LYC 22.4*  --    BEVEN -5.3  --    V6ZDPRG 48.2*  --    SOURCE  --  Brachial, Right    LFTs  Recent Labs     05/23/25  1855 05/24/25  0441   ALT 51 42   AST 48* 43*   ALKPHOS 1,069* 861*   ALB 2.8* 2.8*   TBILI 4.18* 2.93*       Infectious  Recent Labs     05/23/25  2155 05/24/25  0441   PROCALCITONI 1.09* 1.03*     Glucose  Recent Labs      05/23/25  2155 05/24/25  0441 05/24/25  0613 05/24/25  1639   GLUC 615* 110 41* 188*

## 2025-05-25 NOTE — ASSESSMENT & PLAN NOTE
Patient has a history of alcohol use disorder with a history of withdrawal and withdrawal seizures  His alcohol is negative  Will start thiamine folic acid and multivitamin  Severely altered with altered mental status appears to be in liver failure  If needed phenobarbital for withdrawal  Urinary drug screen - neg

## 2025-05-26 ENCOUNTER — APPOINTMENT (INPATIENT)
Dept: RADIOLOGY | Facility: HOSPITAL | Age: 55
DRG: 280 | End: 2025-05-26
Payer: COMMERCIAL

## 2025-05-26 PROBLEM — Z75.8 SPANISH LANGUAGE INTERPRETER NEEDED: Status: ACTIVE | Noted: 2025-05-26

## 2025-05-26 LAB
ALBUMIN SERPL BCG-MCNC: 2.4 G/DL (ref 3.5–5)
ALP SERPL-CCNC: 711 U/L (ref 34–104)
ALT SERPL W P-5'-P-CCNC: 34 U/L (ref 7–52)
ANION GAP SERPL CALCULATED.3IONS-SCNC: 5 MMOL/L (ref 4–13)
AST SERPL W P-5'-P-CCNC: 51 U/L (ref 13–39)
BACTERIA UR QL AUTO: ABNORMAL /HPF
BILIRUB SERPL-MCNC: 3.61 MG/DL (ref 0.2–1)
BILIRUB UR QL STRIP: ABNORMAL
BUN SERPL-MCNC: 16 MG/DL (ref 5–25)
CALCIUM ALBUM COR SERPL-MCNC: 9.6 MG/DL (ref 8.3–10.1)
CALCIUM SERPL-MCNC: 8.3 MG/DL (ref 8.4–10.2)
CHLORIDE SERPL-SCNC: 104 MMOL/L (ref 96–108)
CLARITY UR: CLEAR
CO2 SERPL-SCNC: 24 MMOL/L (ref 21–32)
COLOR UR: YELLOW
CREAT SERPL-MCNC: 2.23 MG/DL (ref 0.6–1.3)
ERYTHROCYTE [DISTWIDTH] IN BLOOD BY AUTOMATED COUNT: 13.9 % (ref 11.6–15.1)
EST. AVERAGE GLUCOSE BLD GHB EST-MCNC: 229 MG/DL
GFR SERPL CREATININE-BSD FRML MDRD: 31 ML/MIN/1.73SQ M
GLUCOSE SERPL-MCNC: 137 MG/DL (ref 65–140)
GLUCOSE SERPL-MCNC: 178 MG/DL (ref 65–140)
GLUCOSE SERPL-MCNC: 203 MG/DL (ref 65–140)
GLUCOSE SERPL-MCNC: 216 MG/DL (ref 65–140)
GLUCOSE SERPL-MCNC: 219 MG/DL (ref 65–140)
GLUCOSE UR STRIP-MCNC: ABNORMAL MG/DL
HBA1C MFR BLD: 9.6 %
HCT VFR BLD AUTO: 25 % (ref 36.5–49.3)
HGB BLD-MCNC: 8.3 G/DL (ref 12–17)
HGB UR QL STRIP.AUTO: ABNORMAL
HYALINE CASTS #/AREA URNS LPF: ABNORMAL /LPF
INR PPP: 5.53 (ref 0.85–1.19)
KETONES UR STRIP-MCNC: NEGATIVE MG/DL
LEUKOCYTE ESTERASE UR QL STRIP: NEGATIVE
MAGNESIUM SERPL-MCNC: 2.5 MG/DL (ref 1.9–2.7)
MCH RBC QN AUTO: 30.2 PG (ref 26.8–34.3)
MCHC RBC AUTO-ENTMCNC: 33.2 G/DL (ref 31.4–37.4)
MCV RBC AUTO: 91 FL (ref 82–98)
NITRITE UR QL STRIP: NEGATIVE
NON-SQ EPI CELLS URNS QL MICRO: ABNORMAL /HPF
PH UR STRIP.AUTO: 6 [PH]
PHOSPHATE SERPL-MCNC: 4.6 MG/DL (ref 2.7–4.5)
PLATELET # BLD AUTO: 279 THOUSANDS/UL (ref 149–390)
PMV BLD AUTO: 11.3 FL (ref 8.9–12.7)
POTASSIUM SERPL-SCNC: 5.3 MMOL/L (ref 3.5–5.3)
PROT SERPL-MCNC: 5.5 G/DL (ref 6.4–8.4)
PROT UR STRIP-MCNC: ABNORMAL MG/DL
PROTHROMBIN TIME: 48.5 SECONDS (ref 12.3–15)
RBC # BLD AUTO: 2.75 MILLION/UL (ref 3.88–5.62)
RBC #/AREA URNS AUTO: ABNORMAL /HPF
SODIUM SERPL-SCNC: 133 MMOL/L (ref 135–147)
SP GR UR STRIP.AUTO: 1.01 (ref 1–1.03)
UROBILINOGEN UR STRIP-ACNC: <2 MG/DL
WBC # BLD AUTO: 12.21 THOUSAND/UL (ref 4.31–10.16)
WBC #/AREA URNS AUTO: ABNORMAL /HPF

## 2025-05-26 PROCEDURE — 83735 ASSAY OF MAGNESIUM: CPT | Performed by: NURSE PRACTITIONER

## 2025-05-26 PROCEDURE — 84100 ASSAY OF PHOSPHORUS: CPT | Performed by: NURSE PRACTITIONER

## 2025-05-26 PROCEDURE — 86015 ACTIN ANTIBODY EACH: CPT | Performed by: INTERNAL MEDICINE

## 2025-05-26 PROCEDURE — 85610 PROTHROMBIN TIME: CPT | Performed by: NURSE PRACTITIONER

## 2025-05-26 PROCEDURE — 81001 URINALYSIS AUTO W/SCOPE: CPT | Performed by: STUDENT IN AN ORGANIZED HEALTH CARE EDUCATION/TRAINING PROGRAM

## 2025-05-26 PROCEDURE — 99232 SBSQ HOSP IP/OBS MODERATE 35: CPT | Performed by: INTERNAL MEDICINE

## 2025-05-26 PROCEDURE — 71045 X-RAY EXAM CHEST 1 VIEW: CPT

## 2025-05-26 PROCEDURE — 86225 DNA ANTIBODY NATIVE: CPT | Performed by: INTERNAL MEDICINE

## 2025-05-26 PROCEDURE — 85027 COMPLETE CBC AUTOMATED: CPT | Performed by: NURSE PRACTITIONER

## 2025-05-26 PROCEDURE — 87040 BLOOD CULTURE FOR BACTERIA: CPT | Performed by: STUDENT IN AN ORGANIZED HEALTH CARE EDUCATION/TRAINING PROGRAM

## 2025-05-26 PROCEDURE — 83036 HEMOGLOBIN GLYCOSYLATED A1C: CPT | Performed by: STUDENT IN AN ORGANIZED HEALTH CARE EDUCATION/TRAINING PROGRAM

## 2025-05-26 PROCEDURE — 86038 ANTINUCLEAR ANTIBODIES: CPT | Performed by: INTERNAL MEDICINE

## 2025-05-26 PROCEDURE — 80053 COMPREHEN METABOLIC PANEL: CPT | Performed by: NURSE PRACTITIONER

## 2025-05-26 PROCEDURE — 82948 REAGENT STRIP/BLOOD GLUCOSE: CPT

## 2025-05-26 RX ORDER — FOLIC ACID 1 MG/1
1 TABLET ORAL DAILY
Status: DISCONTINUED | OUTPATIENT
Start: 2025-05-27 | End: 2025-06-06 | Stop reason: HOSPADM

## 2025-05-26 RX ORDER — ONDANSETRON 2 MG/ML
4 INJECTION INTRAMUSCULAR; INTRAVENOUS EVERY 4 HOURS PRN
Status: DISCONTINUED | OUTPATIENT
Start: 2025-05-26 | End: 2025-06-06 | Stop reason: HOSPADM

## 2025-05-26 RX ORDER — LANOLIN ALCOHOL/MO/W.PET/CERES
100 CREAM (GRAM) TOPICAL DAILY
Status: DISCONTINUED | OUTPATIENT
Start: 2025-05-27 | End: 2025-06-06 | Stop reason: HOSPADM

## 2025-05-26 RX ORDER — PHYTONADIONE 10 MG/ML
10 INJECTION, EMULSION INTRAMUSCULAR; INTRAVENOUS; SUBCUTANEOUS ONCE
Status: COMPLETED | OUTPATIENT
Start: 2025-05-26 | End: 2025-05-26

## 2025-05-26 RX ADMIN — HYDRALAZINE HYDROCHLORIDE 25 MG: 25 TABLET ORAL at 22:16

## 2025-05-26 RX ADMIN — INSULIN LISPRO 1 UNITS: 100 INJECTION, SOLUTION INTRAVENOUS; SUBCUTANEOUS at 09:08

## 2025-05-26 RX ADMIN — INSULIN ASPART 5 UNITS: 100 INJECTION, SUSPENSION SUBCUTANEOUS at 09:29

## 2025-05-26 RX ADMIN — PHYTONADIONE 10 MG: 10 INJECTION, EMULSION INTRAMUSCULAR; INTRAVENOUS; SUBCUTANEOUS at 09:29

## 2025-05-26 RX ADMIN — HYDRALAZINE HYDROCHLORIDE 25 MG: 25 TABLET ORAL at 14:21

## 2025-05-26 RX ADMIN — INSULIN LISPRO 1 UNITS: 100 INJECTION, SOLUTION INTRAVENOUS; SUBCUTANEOUS at 22:17

## 2025-05-26 RX ADMIN — INSULIN ASPART 5 UNITS: 100 INJECTION, SUSPENSION SUBCUTANEOUS at 18:06

## 2025-05-26 RX ADMIN — AMLODIPINE BESYLATE 5 MG: 5 TABLET ORAL at 18:06

## 2025-05-26 RX ADMIN — HYDRALAZINE HYDROCHLORIDE 25 MG: 25 TABLET ORAL at 05:35

## 2025-05-26 RX ADMIN — AMLODIPINE BESYLATE 5 MG: 5 TABLET ORAL at 09:08

## 2025-05-26 NOTE — ASSESSMENT & PLAN NOTE
Evidence of worsening hyperbilirubinemia and coagulopathy  GI following may need paracentesis    Results from last 7 days   Lab Units 05/26/25  0541 05/25/25  0450 05/24/25  0441 05/23/25  1855   AST U/L 51* 60* 43* 48*   ALT U/L 34 37 42 51   TOTAL BILIRUBIN mg/dL 3.61* 3.17* 2.93* 4.18*     Results from last 7 days   Lab Units 05/26/25  0541 05/25/25  0450 05/24/25  0441 05/23/25  1855   INR  5.53* 4.09* 4.50* 3.73*

## 2025-05-26 NOTE — PROGRESS NOTES
Progress Note - Hospitalist   Name: Wes Araujo 55 y.o. male I MRN: [id number]  Unit/Bed#: [place] [address] -01 I Date of Admission: 5/23/2025   Date of Service: 5/26/2025 I Hospital Day: 3    Assessment & Plan  Toxic metabolic encephalopathy  History of atrial fibrillation hypertension alcohol liver disease who presented to the hospital for change in mental status found to have hyperglycemia  Admitted to ICU and stabilized  GI consulted for liver injury/failure  Mentation seems to have improved  Acute renal failure superimposed on stage 3 chronic kidney disease (HCC)  Acute kidney injury on CKD 3 baseline creatinine approximately 1.5  Likely related to progressive liver dysfunction    Results from last 7 days   Lab Units 05/26/25  0541 05/24/25  1639 05/24/25  0613 05/24/25  0441 05/23/25  2155 05/23/25  1855   BUN mg/dL 16 12 12 12 13 12   CREATININE mg/dL 2.23* 1.72* 1.78* 1.72* 1.97* 1.99*   EGFR ml/min/1.73sq m 31 43 42 43 37 36     Acute liver failure  Evidence of worsening hyperbilirubinemia and coagulopathy  GI following may need paracentesis    Results from last 7 days   Lab Units 05/26/25  0541 05/25/25  0450 05/24/25  0441 05/23/25  1855   AST U/L 51* 60* 43* 48*   ALT U/L 34 37 42 51   TOTAL BILIRUBIN mg/dL 3.61* 3.17* 2.93* 4.18*     Results from last 7 days   Lab Units 05/26/25  0541 05/25/25  0450 05/24/25  0441 05/23/25  1855   INR  5.53* 4.09* 4.50* 3.73*     Primary hypertension  Continue amlodipine 5 mg twice daily, hydralazine 25 mg 3 times daily  Type 2 diabetes mellitus with hyperglycemia, with long-term current use of insulin (HCC)  Lab Results   Component Value Date    HGBA1C 9.8 (H) 02/05/2025     Recent Labs     05/25/25  1604 05/25/25  2104 05/26/25  0727 05/26/25  1105   POCGLU 159* 138 219* 203*     HHNK upon arrival likely related to noncompliance with medications and alcohol use  Restarted on 70/30 5 units twice daily  Continue sliding scale  Alcohol abuse  History of alcohol use with  withdrawal and withdrawal seizures  Continue thiamine and folic acid  History of atrial fibrillation  History of atrial fibrillation on metoprolol  Not on anticoagulation at baseline  Electrolyte abnormality  Hyponatremia secondary to hyperglycemia and progressive liver disease    Results from last 7 days   Lab Units 25  0541 25  1639 25  0613 25  0441   SODIUM mmol/L 133* 132* 135 134*     Micronesian  needed   [id number] used today    VTE Pharmacologic Prophylaxis:   Moderate Risk (Score 3-4) - Pharmacological DVT Prophylaxis Contraindicated. Sequential Compression Devices Ordered.    Mobility:   Basic Mobility Inpatient Raw Score: 18  JH-HLM Goal: 6: Walk 10 steps or more  JH-HLM Achieved: 6: Walk 10 steps or more  JH-HLM Goal achieved. Continue to encourage appropriate mobility.    Patient Centered Rounds: I have performed bedside rounds with nursing staff today.  Discussions with Specialists or Other Care Team Provider:     Education and Discussions with Family / Patient:     Current Length of Stay: 3 day(s)  Current Patient Status: Inpatient   Certification Statement: The patient will continue to require additional inpatient hospital stay due to kidney and liver injury, coagulopathy  Discharge Plan: Anticipate discharge in 24-48 hrs to discharge location to be determined pending rehab evaluations.    Code Status: Level 1 - Full Code    Subjective   Patient seen and examined.   used.  No new complaints.    Objective   Vitals:   Temp (24hrs), Av.3 °F (36.8 °C), Min:98 °F (36.7 °C), Max:98.6 °F (37 °C)    Temp:  [98 °F (36.7 °C)-98.6 °F (37 °C)] 98.2 °F (36.8 °C)  HR:  [84-93] 91  Resp:  [14-16] 15  BP: (124-165)/(60-80) 124/65  SpO2:  [96 %-99 %] 96 %  Body mass index is 19.89 kg/m².     Input and Output Summary (last 24 hours):     Intake/Output Summary (Last 24 hours) at 2025 1129  Last data filed at 2025 0900  Gross per 24 hour   Intake  240 ml   Output 251 ml   Net -11 ml       Physical Exam  Vitals reviewed.   Constitutional:       General: He is not in acute distress.  HENT:      Head: Atraumatic.     Cardiovascular:      Rate and Rhythm: Regular rhythm.      Heart sounds: Normal heart sounds.   Pulmonary:      Effort: Pulmonary effort is normal.      Breath sounds: Decreased breath sounds present. No wheezing.   Abdominal:      General: Bowel sounds are normal.      Palpations: Abdomen is soft.      Tenderness: There is no abdominal tenderness.     Musculoskeletal:         General: Tenderness present. No swelling.     Skin:     General: Skin is warm and dry.     Neurological:      General: No focal deficit present.      Mental Status: He is alert.     Psychiatric:         Mood and Affect: Mood normal.       Lines/Drains:  Invasive Devices       Peripheral Intravenous Line  Duration             Peripheral IV 05/23/25 Dorsal (posterior);Left Hand 2 days    Peripheral IV 05/23/25 Proximal;Right;Ventral (anterior) Forearm 2 days    Peripheral IV 05/24/25 Distal;Left;Upper;Ventral (anterior) Arm 2 days                            Lab Results: I have reviewed the following results:   Results from last 7 days   Lab Units 05/26/25  0541 05/25/25  0450 05/24/25  0441   WBC Thousand/uL 12.21* 19.15* 20.48*   HEMOGLOBIN g/dL 8.3* 9.5* 8.6*   PLATELETS Thousands/uL 279 306 278   MCV fL 91 88 90   TOTAL NEUT ABS Thousand/uL  --  15.70*  --    INR  5.53* 4.09* 4.50*     Results from last 7 days   Lab Units 05/26/25  0541 05/25/25  0450 05/24/25  1639 05/24/25  0613 05/24/25  0441   SODIUM mmol/L 133*  --  132* 135 134*   POTASSIUM mmol/L 5.3  --  4.9 3.8 6.6*   CHLORIDE mmol/L 104  --  101 104 102   CO2 mmol/L 24  --  22 22 20*   ANION GAP mmol/L 5  --  9 9 12   BUN mg/dL 16  --  12 12 12   CREATININE mg/dL 2.23*  --  1.72* 1.78* 1.72*   CALCIUM mg/dL 8.3*  --  8.4 8.6 8.5   ALBUMIN g/dL 2.4* 2.5*  --   --  2.8*   TOTAL BILIRUBIN mg/dL 3.61* 3.17*  --   --   2.93*   ALK PHOS U/L 711* 785*  --   --  861*   ALT U/L 34 37  --   --  42   AST U/L 51* 60*  --   --  43*   EGFR ml/min/1.73sq m 31  --  43 42 43   GLUCOSE RANDOM mg/dL 216*  --  188* 41* 110     Results from last 7 days   Lab Units 05/26/25  0541 05/25/25  0450 05/24/25  0613 05/24/25  0441 05/23/25  2155   MAGNESIUM mg/dL 2.5 2.6 1.8* 1.8* 1.9   PHOSPHORUS mg/dL 4.6* 4.7* 2.5* 9.3* 1.9*     Results from last 7 days   Lab Units 05/23/25  1855   CK TOTAL U/L 62     Results from last 7 days   Lab Units 05/24/25  0030 05/23/25  2155 05/23/25  1855   HS TNI 0HR ng/L  --   --  13   HS TNI 2HR ng/L  --  14  --    HS TNI 4HR ng/L 16  --   --           Results from last 7 days   Lab Units 05/24/25  0441   PROCALCITONIN ng/ml 1.03*     Results from last 7 days   Lab Units 05/26/25  1105 05/26/25  0727 05/25/25  2104 05/25/25  1604 05/25/25  1112 05/25/25  0702 05/24/25  2210 05/24/25  2019 05/24/25  1646 05/24/25  1216 05/24/25  0956 05/24/25  0756   POC GLUCOSE mg/dl 203* 219* 138 159* 130 112 192* 147* 188* 183* 148* 113               Recent Cultures (last 7 days):         Imaging:  Reviewed radiology reports from this admission including:  US right upper quadrant  Result Date: 5/23/2025  Impression: Limited exam. Heterogeneous appearance of the pancreas, correlates with fatty replacement and calcifications seen on prior CT, possibly sequela of chronic inflammation/chronic pancreatitis. Liver appears grossly unremarkable. Small volume hypoechoic ascites, and other findings as above. Workstation performed: [id number]     CT head without contrast  Result Date: 5/23/2025  Impression: No acute intracranial abnormality. Workstation performed: [id number]       Last 24 Hours Medication List:     Current Facility-Administered Medications:     amLODIPine (NORVASC) tablet 5 mg, BID    chlorhexidine (PERIDEX) 0.12 % oral rinse 15 mL, Q12H JAISON    folic acid 1 mg, thiamine (VITAMIN B1) 100 mg in sodium chloride 0.9 % 100 mL IV  piggyback, Q24H    hydrALAZINE (APRESOLINE) injection 10 mg, Q4H PRN    hydrALAZINE (APRESOLINE) tablet 25 mg, Q8H JAISON    insulin aspart protamine-insulin aspart (NovoLOG 70/30) 100 units/mL subcutaneous injection 5 Units, BID AC    insulin lispro (HumALOG/ADMELOG) 100 units/mL subcutaneous injection 1-5 Units, TID AC **AND** Fingerstick Glucose (POCT), TID AC    insulin lispro (HumALOG/ADMELOG) 100 units/mL subcutaneous injection 1-5 Units, HS    Administrative Statements   Today, Patient Was Seen By: Bala Leroy, DO  I have spent a total time of 35 minutes in caring for this patient on the day of the visit/encounter including Counseling / Coordination of care, Documenting in the medical record, Reviewing/placing orders in the medical record (including tests, medications, and/or procedures), and Obtaining or reviewing history  .    **Please Note: This note may have been constructed using a voice recognition system.**

## 2025-05-26 NOTE — ASSESSMENT & PLAN NOTE
Hyponatremia secondary to hyperglycemia and progressive liver disease    Results from last 7 days   Lab Units 05/26/25  0541 05/24/25  1639 05/24/25  0613 05/24/25  0441   SODIUM mmol/L 133* 132* 135 134*

## 2025-05-26 NOTE — PLAN OF CARE
Problem: PAIN - ADULT  Goal: Verbalizes/displays adequate comfort level or baseline comfort level  Description: Interventions:  - Encourage patient to monitor pain and request assistance  - Assess pain using appropriate pain scale  - Administer analgesics as ordered based on type and severity of pain and evaluate response  - Implement non-pharmacological measures as appropriate and evaluate response  - Consider cultural and social influences on pain and pain management  - Notify physician/advanced practitioner if interventions unsuccessful or patient reports new pain  - Educate patient/family on pain management process including their role and importance of  reporting pain   - Provide non-pharmacologic/complimentary pain relief interventions  Outcome: Progressing     Problem: SAFETY ADULT  Goal: Patient will remain free of falls  Description: INTERVENTIONS:  - Educate patient/family on patient safety including physical limitations  - Instruct patient to call for assistance with activity   - Consider consulting OT/PT to assist with strengthening/mobility based on AM PAC & JH-HLM score  - Consult OT/PT to assist with strengthening/mobility   - Keep Call bell within reach  - Keep bed low and locked with side rails adjusted as appropriate  - Keep care items and personal belongings within reach  - Initiate and maintain comfort rounds  - Make Fall Risk Sign visible to staff  - Offer Toileting every 2 Hours, in advance of need  - Initiate/Maintain bed alarm  - Obtain necessary fall risk management equipment: bedside  - Apply yellow socks and bracelet for high fall risk patients  - Consider moving patient to room near nurses station  Outcome: Progressing     Problem: RESPIRATORY - ADULT  Goal: Achieves optimal ventilation and oxygenation  Description: INTERVENTIONS:  - Assess for changes in respiratory status  - Assess for changes in mentation and behavior  - Position to facilitate oxygenation and minimize respiratory  effort  - Oxygen administered by appropriate delivery if ordered  - Initiate smoking cessation education as indicated  - Encourage broncho-pulmonary hygiene including cough, deep breathe, Incentive Spirometry  - Assess the need for suctioning and aspirate as needed  - Assess and instruct to report SOB or any respiratory difficulty  - Respiratory Therapy support as indicated  Outcome: Progressing

## 2025-05-26 NOTE — ASSESSMENT & PLAN NOTE
Acute kidney injury on CKD 3 baseline creatinine approximately 1.5  Likely related to progressive liver dysfunction    Results from last 7 days   Lab Units 05/26/25  0541 05/24/25  1639 05/24/25  0613 05/24/25  0441 05/23/25  2155 05/23/25  1855   BUN mg/dL 16 12 12 12 13 12   CREATININE mg/dL 2.23* 1.72* 1.78* 1.72* 1.97* 1.99*   EGFR ml/min/1.73sq m 31 43 42 43 37 36

## 2025-05-26 NOTE — ASSESSMENT & PLAN NOTE
History of atrial fibrillation hypertension alcohol liver disease who presented to the hospital for change in mental status found to have hyperglycemia  Admitted to ICU and stabilized  GI consulted for liver injury/failure  Mentation seems to have improved

## 2025-05-26 NOTE — PLAN OF CARE
Problem: PAIN - ADULT  Goal: Verbalizes/displays adequate comfort level or baseline comfort level  Description: Interventions:  - Encourage patient to monitor pain and request assistance  - Assess pain using appropriate pain scale  - Administer analgesics as ordered based on type and severity of pain and evaluate response  - Implement non-pharmacological measures as appropriate and evaluate response  - Consider cultural and social influences on pain and pain management  - Notify physician/advanced practitioner if interventions unsuccessful or patient reports new pain  - Educate patient/family on pain management process including their role and importance of  reporting pain   - Provide non-pharmacologic/complimentary pain relief interventions  Outcome: Progressing     Problem: INFECTION - ADULT  Goal: Absence or prevention of progression during hospitalization  Description: INTERVENTIONS:  - Assess and monitor for signs and symptoms of infection  - Monitor lab/diagnostic results  - Monitor all insertion sites, i.e. indwelling lines, tubes, and drains  - Monitor endotracheal if appropriate and nasal secretions for changes in amount and color  - [place] appropriate cooling/warming therapies per order  - Administer medications as ordered  - Instruct and encourage patient and family to use good hand hygiene technique  - Identify and instruct in appropriate isolation precautions for identified infection/condition  Outcome: Progressing  Goal: Absence of fever/infection during neutropenic period  Description: INTERVENTIONS:  - Monitor WBC  - Perform strict hand hygiene  - Limit to healthy visitors only  - No plants, dried, fresh or silk flowers with m[place]cl[place] in patient room  Outcome: Progressing     Problem: SAFETY ADULT  Goal: Patient will remain free of falls  Description: INTERVENTIONS:  - Educate patient/family on patient safety including physical limitations  - Instruct patient to call for assistance with activity   -  Consider consulting OT/PT to assist with strengthening/mobility based on AM PAC & JH-HLM score  - Consult OT/PT to assist with strengthening/mobility   - Keep Call bell within reach  - Keep bed low and locked with side rails adjusted as appropriate  - Keep care items and personal belongings within reach  - Initiate and maintain comfort rounds  - Make Fall Risk Sign visible to staff  - Apply yellow socks and bracelet for high fall risk patients  - Consider moving patient to room near nurses station  Outcome: Progressing  Goal: Maintain or return to baseline ADL function  Description: INTERVENTIONS:  -  Assess patient's ability to carry out ADLs; assess patient's baseline for ADL function and identify physical deficits which impact ability to perform ADLs (bathing, care of mouth/teeth, toileting, grooming, dressing, etc.)  - Assess/evaluate cause of self-care deficits   - Assess range of motion  - Assess patient's mobility; develop plan if impaired  - Assess patient's need for assistive devices and provide as appropriate  - Encourage maximum independence but intervene and supervise when necessary  - Involve family in performance of ADLs  - Assess for home care needs following discharge   - Consider OT consult to assist with ADL evaluation and planning for discharge  - Provide patient education as appropriate  - Monitor functional capacity and physical performance, use of AM PAC & JH-HLM   - Monitor gait, balance and fatigue with ambulation    Problem: DISCHARGE PLANNING  Goal: Discharge to home or other facility with appropriate resources  Description: INTERVENTIONS:  - Identify barriers to discharge w/patient and caregiver  - Arrange for needed discharge resources and transportation as appropriate  - Identify discharge learning needs (meds, wound care, etc.)  - Arrange for interpretive services to assist at discharge as needed  - Refer to Case Management Department for coordinating discharge planning if the patient  needs post-hospital services based on physician/advanced practitioner order or complex needs related to functional status, cognitive ability, or social support system  Outcome: Progressing     Problem: Knowledge Deficit  Goal: Patient/family/caregiver demonstrates understanding of disease process, treatment plan, medications, and discharge instructions  Description: Complete learning assessment and assess knowledge base.  Interventions:  - Provide teaching at level of understanding  - Provide teaching via preferred learning methods  Outcome: Progressing     Problem: NEUROSENSORY - ADULT  Goal: Achieves stable or improved neurological status  Description: INTERVENTIONS  - Monitor and report changes in neurological status  - Monitor vital signs such as temperature, blood pressure, glucose, and any other labs ordered   - Initiate measures to prevent increased intracranial pressure  - Monitor for seizure activity and implement precautions if appropriate      Outcome: Progressing  Goal: Remains free of injury related to seizures activity  Description: INTERVENTIONS  - Maintain airway, patient safety  and administer oxygen as ordered  - Monitor patient for seizure activity, document and report duration and description of seizure to physician/advanced practitioner  - If seizure occurs,  ensure patient safety during seizure  - Reorient patient post seizure  - Seizure pads on all 4 side rails  - Instruct patient/family to notify RN of any seizure activity including if an aura is experienced  - Instruct patient/family to call for assistance with activity based on nursing assessment  - Administer anti-seizure medications if ordered    Outcome: Progressing  Goal: Achieves maximal functionality and self care  Description: INTERVENTIONS  - Monitor swallowing and airway patency with patient fatigue and changes in neurological status  - Encourage and assist patient to increase activity and self care.   - Encourage visually  impaired, hearing impaired and aphasic patients to use assistive/communication devices  Outcome: Progressing     Problem: CARDIOVASCULAR - ADULT  Goal: Maintains optimal cardiac output and hemodynamic stability  Description: INTERVENTIONS:  - Monitor I/O, vital signs and rhythm  - Monitor for S/S and trends of decreased cardiac output  - Administer and titrate ordered vasoactive medications to optimize hemodynamic stability  - Assess quality of pulses, skin color and temperature  - Assess for signs of decreased coronary artery perfusion  - Instruct patient to report change in severity of symptoms  Outcome: Progressing  Goal: Absence of cardiac dysrhythmias or at baseline rhythm  Description: INTERVENTIONS:  - Continuous cardiac monitoring, vital signs, obtain 12 lead EKG if ordered  - Administer antiarrhythmic and heart rate control medications as ordered  - Monitor electrolytes and administer replacement therapy as ordered  Outcome: Progressing     Problem: RESPIRATORY - ADULT  Goal: Achieves optimal ventilation and oxygenation  Description: INTERVENTIONS:  - Assess for changes in respiratory status  - Assess for changes in mentation and behavior  - Position to facilitate oxygenation and minimize respiratory effort  - Oxygen administered by appropriate delivery if ordered  - Initiate smoking cessation education as indicated  - Encourage broncho-pulmonary hygiene including cough, deep breathe, Incentive Spirometry  - Assess the need for suctioning and aspirate as needed  - Assess and instruct to report SOB or any respiratory difficulty  - Respiratory Therapy support as indicated  Outcome: Progressing     Problem: GASTROINTESTINAL - ADULT  Goal: Minimal or absence of nausea and/or vomiting  Description: INTERVENTIONS:  - Administer IV fluids if ordered to ensure adequate hydration  - Maintain NPO status until nausea and vomiting are resolved  - Nasogastric tube if ordered  - Administer ordered antiemetic medications  as needed  - Provide nonpharmacologic comfort measures as appropriate  - Advance diet as tolerated, if ordered  - Consider nutrition services referral to assist patient with adequate nutrition and appropriate food choices  Outcome: Progressing  Goal: Maintains or returns to baseline bowel function  Description: INTERVENTIONS:  - Assess bowel function  - Encourage oral fluids to ensure adequate hydration  - Administer IV fluids if ordered to ensure adequate hydration  - Administer ordered medications as needed  - Encourage mobilization and activity  - Consider nutritional services referral to assist patient with adequate nutrition and appropriate food choices  Outcome: Progressing  Goal: Maintains adequate nutritional intake  Description: INTERVENTIONS:  - Monitor percentage of each meal consumed  - Identify factors contributing to decreased intake, treat as appropriate  - Assist with meals as needed  - Monitor I&O, weight, and lab values if indicated  - Obtain nutrition services referral as needed  Outcome: Progressing  Goal: Establish and maintain optimal ostomy function  Description: INTERVENTIONS:  - Assess bowel function  - Encourage oral fluids to ensure adequate hydration  - Administer IV fluids if ordered to ensure adequate hydration   - Administer ordered medications as needed  - Encourage mobilization and activity  - Nutrition services referral to assist patient with appropriate food choices  - Assess stoma site  - Consider wound care consult   Outcome: Progressing  Goal: Oral mucous membranes remain intact  Description: INTERVENTIONS  - Assess oral mucosa and hygiene practices  - Implement preventative oral hygiene regimen  - Implement oral medicated treatments as ordered  - Initiate Nutrition services referral as needed  Outcome: Progressing     Problem: GENITOURINARY - ADULT  Goal: Maintains or returns to baseline urinary function  Description: INTERVENTIONS:  - Assess urinary function  - Encourage oral  fluids to ensure adequate hydration if ordered  - Administer IV fluids as ordered to ensure adequate hydration  - Administer ordered medications as needed  - Offer frequent toileting  - Follow urinary retention protocol if ordered  Outcome: Progressing  Goal: Absence of urinary retention  Description: INTERVENTIONS:  - Assess patient’s ability to void and empty bladder  - Monitor I/O  - Bladder scan as needed  - Discuss with physician/AP medications to alleviate retention as needed  - Discuss catheterization for long term situations as appropriate  Outcome: Progressing  Goal: Urinary catheter remains patent  Description: INTERVENTIONS:  - Assess patency of urinary catheter  - If patient has a chronic ibrahim, consider changing catheter if non-functioning  - Follow guidelines for intermittent irrigation of non-functioning urinary catheter  Outcome: Progressing     Problem: METABOLIC, FLUID AND ELECTROLYTES - ADULT  Goal: Electrolytes maintained within normal limits  Description: INTERVENTIONS:  - Monitor labs and assess patient for signs and symptoms of electrolyte imbalances  - Administer electrolyte replacement as ordered  - Monitor response to electrolyte replacements, including repeat lab results as appropriate  - Instruct patient on fluid and nutrition as appropriate  Outcome: Progressing  Goal: Fluid balance maintained  Description: INTERVENTIONS:  - Monitor labs   - Monitor I/O and WT  - Instruct patient on fluid and nutrition as appropriate  - Assess for signs & symptoms of volume excess or deficit  Outcome: Progressing  Goal: Glucose maintained within target range  Description: INTERVENTIONS:  - Monitor Blood Glucose as ordered  - Assess for signs and symptoms of hyperglycemia and hypoglycemia  - Administer ordered medications to maintain glucose within target range  - Assess nutritional intake and initiate nutrition service referral as needed  Outcome: Progressing     Problem: HEMATOLOGIC - ADULT  Goal:  Maintains hematologic stability  Description: INTERVENTIONS  - Assess for signs and symptoms of bleeding or hemorrhage  - Monitor labs  - Administer supportive blood products/factors as ordered and appropriate  Outcome: Progressing     Problem: MUSCULOSKELETAL - ADULT  Goal: Maintain or return mobility to safest level of function  Description: INTERVENTIONS:  - Assess patient's ability to carry out ADLs; assess patient's baseline for ADL function and identify physical deficits which impact ability to perform ADLs (bathing, care of mouth/teeth, toileting, grooming, dressing, etc.)  - Assess/evaluate cause of self-care deficits   - Assess range of motion  - Assess patient's mobility  - Assess patient's need for assistive devices and provide as appropriate  - Encourage maximum independence but intervene and supervise when necessary  - Involve family in performance of ADLs  - Assess for home care needs following discharge   - Consider OT consult to assist with ADL evaluation and planning for discharge  - Provide patient education as appropriate  Outcome: Progressing  Goal: Maintain proper alignment of affected body part  Description: INTERVENTIONS:  - Support, maintain and protect limb and body alignment  - Provide patient/ family with appropriate education  Outcome: Progressing     Problem: SAFETY,RESTRAINT: NV/NON-SELF DESTRUCTIVE BEHAVIOR  Goal: Remains free of harm/injury (restraint for non violent/non self-detsructive behavior)  Description: INTERVENTIONS:  - Instruct patient/family regarding restraint use   - Assess and monitor physiologic and psychological status   - Provide interventions and comfort measures to meet assessed patient needs   - Identify and implement measures to help patient regain control  - Assess readiness for release of restraint   Outcome: Progressing  Goal: Returns to optimal restraint-free functioning  Description: INTERVENTIONS:  - Assess the patient's behavior and symptoms that indicate  continued need for restraint  - Identify and implement measures to help patient regain control  - Assess readiness for release of restraint   Outcome: Progressing     Problem: Prexisting or High Potential for Compromised Skin Integrity  Goal: Skin integrity is maintained or improved  Description: INTERVENTIONS:  - Identify patients at risk for skin breakdown  - Assess and monitor skin integrity including under and around medical devices   - Assess and monitor nutrition and hydration status  - Monitor labs  - Assess for incontinence   - Turn and reposition patient  - Assist with mobility/ambulation  - Relieve pressure over keith prominences   - Avoid friction and shearing  - Provide appropriate hygiene as needed including keeping skin clean and dry  - Evaluate need for skin moisturizer/barrier cream  - Collaborate with interdisciplinary team  - Patient/family teaching  - Consider wound care consult    Problem: Nutrition/Hydration-ADULT  Goal: Nutrient/Hydration intake appropriate for improving, restoring or maintaining nutritional needs  Description: Monitor and assess patient's nutrition/hydration status for malnutrition. Collaborate with interdisciplinary team and initiate plan and interventions as ordered.  Monitor patient's weight and dietary intake as ordered or per policy. Utilize nutrition screening tool and intervene as necessary. Determine patient's food preferences and provide high-protein, high-caloric foods as appropriate.     INTERVENTIONS:  - Monitor oral intake, urinary output, labs, and treatment plans  - Assess nutrition and hydration status and recommend course of action  - Evaluate amount of meals eaten  - Assist patient with eating if necessary   - Allow adequate time for meals  - Recommend/ encourage appropriate diets, oral nutritional supplements, and vitamin/mineral supplements  - Order, calculate, and assess calorie counts as needed  - Recommend, monitor, and adjust tube feedings and TPN/PPN  based on assessed needs  - Assess need for intravenous fluids  - Provide specific nutrition/hydration education as appropriate  - Include patient/family/caregiver in decisions related to nutrition  Outcome: Progressing

## 2025-05-26 NOTE — ASSESSMENT & PLAN NOTE
Lab Results   Component Value Date    HGBA1C 9.8 (H) 02/05/2025     Recent Labs     05/25/25  1604 05/25/25  2104 05/26/25  0727 05/26/25  1105   POCGLU 159* 138 219* 203*     HHNK upon arrival likely related to noncompliance with medications and alcohol use  Restarted on 70/30 5 units twice daily  Continue sliding scale

## 2025-05-27 PROBLEM — R79.89 ELEVATED LFTS: Status: ACTIVE | Noted: 2025-05-27

## 2025-05-27 PROBLEM — N17.9 ACUTE KIDNEY INJURY (HCC): Status: ACTIVE | Noted: 2025-05-27

## 2025-05-27 PROBLEM — D64.89 OTHER SPECIFIED ANEMIAS: Status: ACTIVE | Noted: 2025-05-27

## 2025-05-27 PROBLEM — N18.32 CKD STAGE 3B, GFR 30-44 ML/MIN (HCC): Status: ACTIVE | Noted: 2025-05-27

## 2025-05-27 LAB
ACTIN IGG SERPL-ACNC: 7 UNITS (ref 0–19)
ALBUMIN SERPL BCG-MCNC: 2.5 G/DL (ref 3.5–5)
ALP SERPL-CCNC: 646 U/L (ref 34–104)
ALT SERPL W P-5'-P-CCNC: 33 U/L (ref 7–52)
ANION GAP SERPL CALCULATED.3IONS-SCNC: 6 MMOL/L (ref 4–13)
AST SERPL W P-5'-P-CCNC: 49 U/L (ref 13–39)
BILIRUB SERPL-MCNC: 7.14 MG/DL (ref 0.2–1)
BUN SERPL-MCNC: 21 MG/DL (ref 5–25)
CALCIUM ALBUM COR SERPL-MCNC: 9.3 MG/DL (ref 8.3–10.1)
CALCIUM SERPL-MCNC: 8.1 MG/DL (ref 8.4–10.2)
CERULOPLASMIN SERPL-MCNC: 23.5 MG/DL (ref 16–31)
CHLORIDE SERPL-SCNC: 105 MMOL/L (ref 96–108)
CO2 SERPL-SCNC: 21 MMOL/L (ref 21–32)
CREAT SERPL-MCNC: 2.47 MG/DL (ref 0.6–1.3)
DSDNA IGG SERPL IA-ACNC: 1.1 IU/ML (ref ?–15)
ERYTHROCYTE [DISTWIDTH] IN BLOOD BY AUTOMATED COUNT: 13.3 % (ref 11.6–15.1)
GFR SERPL CREATININE-BSD FRML MDRD: 28 ML/MIN/1.73SQ M
GLUCOSE SERPL-MCNC: 150 MG/DL (ref 65–140)
GLUCOSE SERPL-MCNC: 153 MG/DL (ref 65–140)
GLUCOSE SERPL-MCNC: 208 MG/DL (ref 65–140)
GLUCOSE SERPL-MCNC: 216 MG/DL (ref 65–140)
GLUCOSE SERPL-MCNC: 240 MG/DL (ref 65–140)
HCT VFR BLD AUTO: 21.8 % (ref 36.5–49.3)
HGB BLD-MCNC: 7.2 G/DL (ref 12–17)
INR PPP: 1.11 (ref 0.85–1.19)
MCH RBC QN AUTO: 31 PG (ref 26.8–34.3)
MCHC RBC AUTO-ENTMCNC: 33 G/DL (ref 31.4–37.4)
MCV RBC AUTO: 94 FL (ref 82–98)
MITOCHONDRIA M2 IGG SER-ACNC: <20 UNITS (ref 0–20)
NUCLEAR IGG SER IA-RTO: <0.09 RATIO (ref ?–1)
PLATELET # BLD AUTO: 261 THOUSANDS/UL (ref 149–390)
PMV BLD AUTO: 11.7 FL (ref 8.9–12.7)
POTASSIUM SERPL-SCNC: 5 MMOL/L (ref 3.5–5.3)
PROT SERPL-MCNC: 5.4 G/DL (ref 6.4–8.4)
PROTHROMBIN TIME: 14.5 SECONDS (ref 12.3–15)
RBC # BLD AUTO: 2.32 MILLION/UL (ref 3.88–5.62)
SODIUM SERPL-SCNC: 132 MMOL/L (ref 135–147)
WBC # BLD AUTO: 11.67 THOUSAND/UL (ref 4.31–10.16)

## 2025-05-27 PROCEDURE — 97167 OT EVAL HIGH COMPLEX 60 MIN: CPT

## 2025-05-27 PROCEDURE — 85610 PROTHROMBIN TIME: CPT | Performed by: INTERNAL MEDICINE

## 2025-05-27 PROCEDURE — 85027 COMPLETE CBC AUTOMATED: CPT | Performed by: INTERNAL MEDICINE

## 2025-05-27 PROCEDURE — 82948 REAGENT STRIP/BLOOD GLUCOSE: CPT

## 2025-05-27 PROCEDURE — 99232 SBSQ HOSP IP/OBS MODERATE 35: CPT | Performed by: INTERNAL MEDICINE

## 2025-05-27 PROCEDURE — 99255 IP/OBS CONSLTJ NEW/EST HI 80: CPT | Performed by: INTERNAL MEDICINE

## 2025-05-27 PROCEDURE — 80053 COMPREHEN METABOLIC PANEL: CPT | Performed by: INTERNAL MEDICINE

## 2025-05-27 RX ORDER — ALBUMIN (HUMAN) 12.5 G/50ML
25 SOLUTION INTRAVENOUS EVERY 6 HOURS
Status: DISPENSED | OUTPATIENT
Start: 2025-05-27 | End: 2025-05-29

## 2025-05-27 RX ORDER — AMLODIPINE BESYLATE 5 MG/1
5 TABLET ORAL 2 TIMES DAILY
Status: DISCONTINUED | OUTPATIENT
Start: 2025-05-27 | End: 2025-06-06 | Stop reason: HOSPADM

## 2025-05-27 RX ORDER — ALBUMIN (HUMAN) 12.5 G/50ML
25 SOLUTION INTRAVENOUS EVERY 12 HOURS SCHEDULED
Status: DISCONTINUED | OUTPATIENT
Start: 2025-05-27 | End: 2025-05-27

## 2025-05-27 RX ADMIN — INSULIN ASPART 5 UNITS: 100 INJECTION, SUSPENSION SUBCUTANEOUS at 17:07

## 2025-05-27 RX ADMIN — ALBUMIN (HUMAN) 25 G: 0.25 INJECTION, SOLUTION INTRAVENOUS at 21:19

## 2025-05-27 RX ADMIN — INSULIN LISPRO 1 UNITS: 100 INJECTION, SOLUTION INTRAVENOUS; SUBCUTANEOUS at 08:07

## 2025-05-27 RX ADMIN — HYDRALAZINE HYDROCHLORIDE 25 MG: 25 TABLET ORAL at 13:02

## 2025-05-27 RX ADMIN — ALBUMIN (HUMAN) 25 G: 0.25 INJECTION, SOLUTION INTRAVENOUS at 15:23

## 2025-05-27 RX ADMIN — FOLIC ACID 1 MG: 1 TABLET ORAL at 08:07

## 2025-05-27 RX ADMIN — INSULIN ASPART 5 UNITS: 100 INJECTION, SUSPENSION SUBCUTANEOUS at 08:07

## 2025-05-27 RX ADMIN — ALBUMIN (HUMAN) 25 G: 0.25 INJECTION, SOLUTION INTRAVENOUS at 09:23

## 2025-05-27 RX ADMIN — INSULIN LISPRO 1 UNITS: 100 INJECTION, SOLUTION INTRAVENOUS; SUBCUTANEOUS at 12:24

## 2025-05-27 RX ADMIN — HYDRALAZINE HYDROCHLORIDE 25 MG: 25 TABLET ORAL at 21:19

## 2025-05-27 RX ADMIN — Medication 100 MG: at 08:07

## 2025-05-27 RX ADMIN — AMLODIPINE BESYLATE 5 MG: 5 TABLET ORAL at 17:07

## 2025-05-27 RX ADMIN — INSULIN LISPRO 1 UNITS: 100 INJECTION, SOLUTION INTRAVENOUS; SUBCUTANEOUS at 21:19

## 2025-05-27 RX ADMIN — HYDRALAZINE HYDROCHLORIDE 25 MG: 25 TABLET ORAL at 05:44

## 2025-05-27 RX ADMIN — AMLODIPINE BESYLATE 5 MG: 5 TABLET ORAL at 08:07

## 2025-05-27 RX ADMIN — INSULIN LISPRO 2 UNITS: 100 INJECTION, SOLUTION INTRAVENOUS; SUBCUTANEOUS at 17:08

## 2025-05-27 NOTE — ASSESSMENT & PLAN NOTE
Lab Results   Component Value Date    EGFR 28 05/27/2025    EGFR 31 05/26/2025    EGFR 43 05/24/2025    CREATININE 2.47 (H) 05/27/2025    CREATININE 2.23 (H) 05/26/2025    CREATININE 1.72 (H) 05/24/2025   After review of records it appears that the patient has a baseline Creatinine of 1.5-2.0 mg/dL.  Avoid nephrotoxins, adjust meds to appropriate GFR.  Likely has underlying CKD secondary to poorly controlled diabetes plus hypertensive nephrosclerosis plus age-related nephron loss  Outpatient for nephrology follows up with nurse practitioner Tanika last seen 2019

## 2025-05-27 NOTE — ASSESSMENT & PLAN NOTE
Evidence of worsening hyperbilirubinemia and coagulopathy  Discussing with gastroenterology for further recommendations  Follow-up on blood cultures to rule out infection    Results from last 7 days   Lab Units 05/27/25  0430 05/26/25  0541 05/25/25  0450 05/24/25  0441 05/23/25  1855   AST U/L 49* 51* 60* 43* 48*   ALT U/L 33 34 37 42 51   TOTAL BILIRUBIN mg/dL 7.14* 3.61* 3.17* 2.93* 4.18*     Results from last 7 days   Lab Units 05/27/25  0430 05/26/25  0541 05/25/25  0450 05/24/25  0441   INR  1.11 5.53* 4.09* 4.50*

## 2025-05-27 NOTE — ASSESSMENT & PLAN NOTE
Most recent serum sodium 132 mill equivalents corrected for hyperglycemia within normal limits  Check BMP in a.m.

## 2025-05-27 NOTE — ASSESSMENT & PLAN NOTE
most recent hemoglobin at 7.2 g/dL  Maintain hemoglobin greater than 8 grams/deciliter  Follow-up with primary team can consider PRBC transfusion if continues to drop

## 2025-05-27 NOTE — ASSESSMENT & PLAN NOTE
History of atrial fibrillation hypertension alcohol liver disease who presented to the hospital for change in mental status found to have hyperglycemia  Admitted to ICU and stabilized  GI consulted for liver injury/failure.  Father reports active consumption of alcohol  Discussed with father.  Mentation much improved and near baseline

## 2025-05-27 NOTE — ASSESSMENT & PLAN NOTE
Optimize hemodynamic status to avoid renal ischemic injury.  Maintain MAP > 65mmHg  Avoid BP fluctuations.  Home medications: Hydralazine 25 mg p.o. every 8, Norvasc 5 mg p.o. twice daily  Current medications: Norvasc 5 mg p.o. twice daily, hydralazine 25 mg p.o. every 8  Recommend albumin 25 g IV every 6 for 48 hours  Adjusted holding parameters

## 2025-05-27 NOTE — ASSESSMENT & PLAN NOTE
Patient presenting with altered mental status.  Likely due to significantly elevated blood glucose from insulin noncompliance.  Fortunately encephalopathy appears to have now improved.  Management per primary team.

## 2025-05-27 NOTE — PLAN OF CARE
Problem: PAIN - ADULT  Goal: Verbalizes/displays adequate comfort level or baseline comfort level  Description: Interventions:  - Encourage patient to monitor pain and request assistance  - Assess pain using appropriate pain scale  - Administer analgesics as ordered based on type and severity of pain and evaluate response  - Implement non-pharmacological measures as appropriate and evaluate response  - Consider cultural and social influences on pain and pain management  - Notify physician/advanced practitioner if interventions unsuccessful or patient reports new pain  - Educate patient/family on pain management process including their role and importance of  reporting pain   - Provide non-pharmacologic/complimentary pain relief interventions  Outcome: Progressing     Problem: INFECTION - ADULT  Goal: Absence or prevention of progression during hospitalization  Description: INTERVENTIONS:  - Assess and monitor for signs and symptoms of infection  - Monitor lab/diagnostic results  - Monitor all insertion sites, i.e. indwelling lines, tubes, and drains  - Monitor endotracheal if appropriate and nasal secretions for changes in amount and color  - [place] appropriate cooling/warming therapies per order  - Administer medications as ordered  - Instruct and encourage patient and family to use good hand hygiene technique  - Identify and instruct in appropriate isolation precautions for identified infection/condition  Outcome: Progressing  Goal: Absence of fever/infection during neutropenic period  Description: INTERVENTIONS:  - Monitor WBC  - Perform strict hand hygiene  - Limit to healthy visitors only  - No plants, dried, fresh or silk flowers with mcclure in patient room  Outcome: Progressing     Problem: SAFETY ADULT  Goal: Patient will remain free of falls  Description: INTERVENTIONS:  - Educate patient/family on patient safety including physical limitations  - Instruct patient to call for assistance with activity   -  Consider consulting OT/PT to assist with strengthening/mobility based on AM PAC & JH-HLM score  - Consult OT/PT to assist with strengthening/mobility   - Keep Call bell within reach  - Keep bed low and locked with side rails adjusted as appropriate  - Keep care items and personal belongings within reach  - Initiate and maintain comfort rounds  - Make Fall Risk Sign visible to staff  - Offer Toileting every 2 Hours, in advance of need  - Initiate/Maintain bed alarm  - Obtain necessary fall risk management equipment: alarms  - Apply yellow socks and bracelet for high fall risk patients  - Consider moving patient to room near nurses station  Outcome: Progressing  Goal: Maintain or return to baseline ADL function  Description: INTERVENTIONS:  -  Assess patient's ability to carry out ADLs; assess patient's baseline for ADL function and identify physical deficits which impact ability to perform ADLs (bathing, care of mouth/teeth, toileting, grooming, dressing, etc.)  - Assess/evaluate cause of self-care deficits   - Assess range of motion  - Assess patient's mobility; develop plan if impaired  - Assess patient's need for assistive devices and provide as appropriate  - Encourage maximum independence but intervene and supervise when necessary  - Involve family in performance of ADLs  - Assess for home care needs following discharge   - Consider OT consult to assist with ADL evaluation and planning for discharge  - Provide patient education as appropriate  - Monitor functional capacity and physical performance, use of AM PAC & JH-HLM   - Monitor gait, balance and fatigue with ambulation    Outcome: Progressing  Goal: Maintains/Returns to pre admission functional level  Description: INTERVENTIONS:  - Perform AM-PAC 6 Click Basic Mobility/ Daily Activity assessment daily.  - Set and communicate daily mobility goal to care team and patient/family/caregiver.   - Collaborate with rehabilitation services on mobility goals if  consulted  - Perform Range of Motion 4 times a day.  - Reposition patient every 2 hours.  - Dangle patient 3 times a day  - Stand patient 3 times a day  - Ambulate patient 3 times a day  - Out of bed to chair 3 times a day   - Out of bed for meals 3 times a day  - Out of bed for toileting  - Record patient progress and toleration of activity level   Outcome: Progressing     Problem: DISCHARGE PLANNING  Goal: Discharge to home or other facility with appropriate resources  Description: INTERVENTIONS:  - Identify barriers to discharge w/patient and caregiver  - Arrange for needed discharge resources and transportation as appropriate  - Identify discharge learning needs (meds, wound care, etc.)  - Arrange for interpretive services to assist at discharge as needed  - Refer to Case Management Department for coordinating discharge planning if the patient needs post-hospital services based on physician/advanced practitioner order or complex needs related to functional status, cognitive ability, or social support system  Outcome: Progressing     Problem: Knowledge Deficit  Goal: Patient/family/caregiver demonstrates understanding of disease process, treatment plan, medications, and discharge instructions  Description: Complete learning assessment and assess knowledge base.  Interventions:  - Provide teaching at level of understanding  - Provide teaching via preferred learning methods  Outcome: Progressing     Problem: NEUROSENSORY - ADULT  Goal: Achieves stable or improved neurological status  Description: INTERVENTIONS  - Monitor and report changes in neurological status  - Monitor vital signs such as temperature, blood pressure, glucose, and any other labs ordered   - Initiate measures to prevent increased intracranial pressure  - Monitor for seizure activity and implement precautions if appropriate      Outcome: Progressing  Goal: Remains free of injury related to seizures activity  Description: INTERVENTIONS  - Maintain  airway, patient safety  and administer oxygen as ordered  - Monitor patient for seizure activity, document and report duration and description of seizure to physician/advanced practitioner  - If seizure occurs,  ensure patient safety during seizure  - Reorient patient post seizure  - Seizure pads on all 4 side rails  - Instruct patient/family to notify RN of any seizure activity including if an aura is experienced  - Instruct patient/family to call for assistance with activity based on nursing assessment  - Administer anti-seizure medications if ordered    Outcome: Progressing  Goal: Achieves maximal functionality and self care  Description: INTERVENTIONS  - Monitor swallowing and airway patency with patient fatigue and changes in neurological status  - Encourage and assist patient to increase activity and self care.   - Encourage visually impaired, hearing impaired and aphasic patients to use assistive/communication devices  Outcome: Progressing     Problem: CARDIOVASCULAR - ADULT  Goal: Maintains optimal cardiac output and hemodynamic stability  Description: INTERVENTIONS:  - Monitor I/O, vital signs and rhythm  - Monitor for S/S and trends of decreased cardiac output  - Administer and titrate ordered vasoactive medications to optimize hemodynamic stability  - Assess quality of pulses, skin color and temperature  - Assess for signs of decreased coronary artery perfusion  - Instruct patient to report change in severity of symptoms  Outcome: Progressing  Goal: Absence of cardiac dysrhythmias or at baseline rhythm  Description: INTERVENTIONS:  - Continuous cardiac monitoring, vital signs, obtain 12 lead EKG if ordered  - Administer antiarrhythmic and heart rate control medications as ordered  - Monitor electrolytes and administer replacement therapy as ordered  Outcome: Progressing     Problem: RESPIRATORY - ADULT  Goal: Achieves optimal ventilation and oxygenation  Description: INTERVENTIONS:  - Assess for changes  in respiratory status  - Assess for changes in mentation and behavior  - Position to facilitate oxygenation and minimize respiratory effort  - Oxygen administered by appropriate delivery if ordered  - Initiate smoking cessation education as indicated  - Encourage broncho-pulmonary hygiene including cough, deep breathe, Incentive Spirometry  - Assess the need for suctioning and aspirate as needed  - Assess and instruct to report SOB or any respiratory difficulty  - Respiratory Therapy support as indicated  Outcome: Progressing     Problem: GASTROINTESTINAL - ADULT  Goal: Minimal or absence of nausea and/or vomiting  Description: INTERVENTIONS:  - Administer IV fluids if ordered to ensure adequate hydration  - Maintain NPO status until nausea and vomiting are resolved  - Nasogastric tube if ordered  - Administer ordered antiemetic medications as needed  - Provide nonpharmacologic comfort measures as appropriate  - Advance diet as tolerated, if ordered  - Consider nutrition services referral to assist patient with adequate nutrition and appropriate food choices  Outcome: Progressing  Goal: Maintains or returns to baseline bowel function  Description: INTERVENTIONS:  - Assess bowel function  - Encourage oral fluids to ensure adequate hydration  - Administer IV fluids if ordered to ensure adequate hydration  - Administer ordered medications as needed  - Encourage mobilization and activity  - Consider nutritional services referral to assist patient with adequate nutrition and appropriate food choices  Outcome: Progressing  Goal: Maintains adequate nutritional intake  Description: INTERVENTIONS:  - Monitor percentage of each meal consumed  - Identify factors contributing to decreased intake, treat as appropriate  - Assist with meals as needed  - Monitor I&O, weight, and lab values if indicated  - Obtain nutrition services referral as needed  Outcome: Progressing  Goal: Establish and maintain optimal ostomy  function  Description: INTERVENTIONS:  - Assess bowel function  - Encourage oral fluids to ensure adequate hydration  - Administer IV fluids if ordered to ensure adequate hydration   - Administer ordered medications as needed  - Encourage mobilization and activity  - Nutrition services referral to assist patient with appropriate food choices  - Assess stoma site  - Consider wound care consult   Outcome: Progressing  Goal: Oral mucous membranes remain intact  Description: INTERVENTIONS  - Assess oral mucosa and hygiene practices  - Implement preventative oral hygiene regimen  - Implement oral medicated treatments as ordered  - Initiate Nutrition services referral as needed  Outcome: Progressing     Problem: GENITOURINARY - ADULT  Goal: Maintains or returns to baseline urinary function  Description: INTERVENTIONS:  - Assess urinary function  - Encourage oral fluids to ensure adequate hydration if ordered  - Administer IV fluids as ordered to ensure adequate hydration  - Administer ordered medications as needed  - Offer frequent toileting  - Follow urinary retention protocol if ordered  Outcome: Progressing  Goal: Absence of urinary retention  Description: INTERVENTIONS:  - Assess patient’s ability to void and empty bladder  - Monitor I/O  - Bladder scan as needed  - Discuss with physician/AP medications to alleviate retention as needed  - Discuss catheterization for long term situations as appropriate  Outcome: Progressing  Goal: Urinary catheter remains patent  Description: INTERVENTIONS:  - Assess patency of urinary catheter  - If patient has a chronic ibrahim, consider changing catheter if non-functioning  - Follow guidelines for intermittent irrigation of non-functioning urinary catheter  Outcome: Progressing     Problem: METABOLIC, FLUID AND ELECTROLYTES - ADULT  Goal: Electrolytes maintained within normal limits  Description: INTERVENTIONS:  - Monitor labs and assess patient for signs and symptoms of electrolyte  imbalances  - Administer electrolyte replacement as ordered  - Monitor response to electrolyte replacements, including repeat lab results as appropriate  - Instruct patient on fluid and nutrition as appropriate  Outcome: Progressing  Goal: Fluid balance maintained  Description: INTERVENTIONS:  - Monitor labs   - Monitor I/O and WT  - Instruct patient on fluid and nutrition as appropriate  - Assess for signs & symptoms of volume excess or deficit  Outcome: Progressing  Goal: Glucose maintained within target range  Description: INTERVENTIONS:  - Monitor Blood Glucose as ordered  - Assess for signs and symptoms of hyperglycemia and hypoglycemia  - Administer ordered medications to maintain glucose within target range  - Assess nutritional intake and initiate nutrition service referral as needed  Outcome: Progressing     Problem: SKIN/TISSUE INTEGRITY - ADULT  Goal: Skin Integrity remains intact(Skin Breakdown Prevention)  Description: Assess:  -Perform Jose assessment every shift  -Clean and moisturize skin every shift  -Inspect skin when repositioning, toileting, and assisting with ADLS  -Assess under medical devices such as IV every shift  -Assess extremities for adequate circulation and sensation     Bed Management:  -Have minimal linens on bed & keep smooth, unwrinkled  -Change linens as needed when moist or perspiring  -Avoid sitting or lying in one position for more than 2 hours while in bed  -Keep HOB at 30 degrees     Toileting:  -Offer bedside commode  -Assess for incontinence every shift  -Use incontinent care products after each incontinent episode such as foam cleanser    Activity:  -Mobilize patient 3 times a day  -Encourage activity and walks on unit  -Encourage or provide ROM exercises   -Turn and reposition patient every 2 Hours  -Use appropriate equipment to lift or move patient in bed  -Instruct/ Assist with weight shifting every hour when out of bed in chair  -Consider limitation of chair time 1  hour intervals    Skin Care:  -Avoid use of baby powder, tape, friction and shearing, hot water or constrictive clothing  -Relieve pressure over bony prominences using pillows  -Do not massage red bony areas    Next Steps:  -Teach patient strategies to minimize risks such as skin breakdown   -Consider consults to  interdisciplinary teams such as wound care  Outcome: Progressing  Goal: Incision(s), wounds(s) or drain site(s) healing without S/S of infection  Description: INTERVENTIONS  - Assess and document dressing, incision, wound bed, drain sites and surrounding tissue  - Provide patient and family education  - Perform skin care/dressing changes every shift  Outcome: Progressing  Goal: Pressure injury heals and does not worsen  Description: Interventions:  - Implement low air loss mattress or specialty surface (Criteria met)  - Apply silicone foam dressing  - Instruct/assist with weight shifting every 30  minutes when in chair   - Limit chair time to 1 hour intervals  - Use special pressure reducing interventions such as cushion when in chair   - Apply fecal or urinary incontinence containment device   - Perform passive or active ROM every shift  - Turn and reposition patient & offload bony prominences every 2 hours   - Utilize friction reducing device or surface for transfers   - Consider consults to  interdisciplinary teams such as wound care  - Use incontinent care products after each incontinent episode such as foam cleanser  - Consider nutrition services referral as needed  Outcome: Progressing     Problem: HEMATOLOGIC - ADULT  Goal: Maintains hematologic stability  Description: INTERVENTIONS  - Assess for signs and symptoms of bleeding or hemorrhage  - Monitor labs  - Administer supportive blood products/factors as ordered and appropriate  Outcome: Progressing     Problem: MUSCULOSKELETAL - ADULT  Goal: Maintain or return mobility to safest level of function  Description: INTERVENTIONS:  - Assess patient's  ability to carry out ADLs; assess patient's baseline for ADL function and identify physical deficits which impact ability to perform ADLs (bathing, care of mouth/teeth, toileting, grooming, dressing, etc.)  - Assess/evaluate cause of self-care deficits   - Assess range of motion  - Assess patient's mobility  - Assess patient's need for assistive devices and provide as appropriate  - Encourage maximum independence but intervene and supervise when necessary  - Involve family in performance of ADLs  - Assess for home care needs following discharge   - Consider OT consult to assist with ADL evaluation and planning for discharge  - Provide patient education as appropriate  Outcome: Progressing  Goal: Maintain proper alignment of affected body part  Description: INTERVENTIONS:  - Support, maintain and protect limb and body alignment  - Provide patient/ family with appropriate education  Outcome: Progressing     Problem: SAFETY,RESTRAINT: NV/NON-SELF DESTRUCTIVE BEHAVIOR  Goal: Remains free of harm/injury (restraint for non violent/non self-detsructive behavior)  Description: INTERVENTIONS:  - Instruct patient/family regarding restraint use   - Assess and monitor physiologic and psychological status   - Provide interventions and comfort measures to meet assessed patient needs   - Identify and implement measures to help patient regain control  - Assess readiness for release of restraint   Outcome: Progressing  Goal: Returns to optimal restraint-free functioning  Description: INTERVENTIONS:  - Assess the patient's behavior and symptoms that indicate continued need for restraint  - Identify and implement measures to help patient regain control  - Assess readiness for release of restraint   Outcome: Progressing     Problem: Prexisting or High Potential for Compromised Skin Integrity  Goal: Skin integrity is maintained or improved  Description: INTERVENTIONS:  - Identify patients at risk for skin breakdown  - Assess and monitor  skin integrity including under and around medical devices   - Assess and monitor nutrition and hydration status  - Monitor labs  - Assess for incontinence   - Turn and reposition patient  - Assist with mobility/ambulation  - Relieve pressure over keith prominences   - Avoid friction and shearing  - Provide appropriate hygiene as needed including keeping skin clean and dry  - Evaluate need for skin moisturizer/barrier cream  - Collaborate with interdisciplinary team  - Patient/family teaching  - Consider wound care consult    Assess:  - Review Jose scale daily  - Clean and moisturize skin every shift  - Inspect skin when repositioning, toileting, and assisting with ADLS  - Assess under medical devices such as IV every shift  - Assess extremities for adequate circulation and sensation     Bed Management:  - Have minimal linens on bed & keep smooth, unwrinkled  - Change linens as needed when moist or perspiring  - Avoid sitting or lying in one position for more than 2 hours while in bed?Keep HOB at 30 degrees   - Toileting:  - Offer bedside commode  - Assess for incontinence every shift  - Use incontinent care products after each incontinent episode such as foam cleanser    Activity:  - Mobilize patient 3 times a day  - Encourage activity and walks on unit  - Encourage or provide ROM exercises   - Turn and reposition patient every 2 Hours  - Use appropriate equipment to lift or move patient in bed  - Instruct/ Assist with weight shifting every hour when out of bed in chair  - Consider limitation of chair time 12 hour intervals    Skin Care:  - Avoid use of baby powder, tape, friction and shearing, hot water or constrictive clothing  - Relieve pressure over bony prominences using pillows  - Do not massage red bony areas    Next Steps:  - Teach patient strategies to minimize risks such as skin breakdown  - Consider consults to  interdisciplinary teams such as wound care  Outcome: Progressing     Problem:  Nutrition/Hydration-ADULT  Goal: Nutrient/Hydration intake appropriate for improving, restoring or maintaining nutritional needs  Description: Monitor and assess patient's nutrition/hydration status for malnutrition. Collaborate with interdisciplinary team and initiate plan and interventions as ordered.  Monitor patient's weight and dietary intake as ordered or per policy. Utilize nutrition screening tool and intervene as necessary. Determine patient's food preferences and provide high-protein, high-caloric foods as appropriate.     INTERVENTIONS:  - Monitor oral intake, urinary output, labs, and treatment plans  - Assess nutrition and hydration status and recommend course of action  - Evaluate amount of meals eaten  - Assist patient with eating if necessary   - Allow adequate time for meals  - Recommend/ encourage appropriate diets, oral nutritional supplements, and vitamin/mineral supplements  - Order, calculate, and assess calorie counts as needed  - Recommend, monitor, and adjust tube feedings and TPN/PPN based on assessed needs  - Assess need for intravenous fluids  - Provide specific nutrition/hydration education as appropriate  - Include patient/family/caregiver in decisions related to nutrition  Outcome: Progressing

## 2025-05-27 NOTE — ASSESSMENT & PLAN NOTE
55-year-old male with a past medical history of hypertension, CKD, diabetes mellitus type 2, chronic pancreatitis secondary to chronic alcohol use who presented to [facility] on 5/23 with altered mental status likely due to elevated blood glucose.  Fortunately now mental status has improved.  However, patient's LFTs were also significantly elevated on admission.  On admission, AST/ALT 48/51, alkaline phosphatase 1069, total bilirubin 4.18.  INR also elevated to 5.53 but now improved status post vitamin K.  Patient has had LFT elevations in the past.  Admission RUQ US with no acute findings including grossly stable liver and biliary tree.  Prior MRI imaging completed for evaluation of LFT elevation in 2024 also unremarkable.  Suspect that LFT elevations are likely due to longstanding history of alcohol use with a component of alcoholic hepatitis. Suspect that this is superimposed on some degree of fibrosis.  Given improvement in INR, no indication for steroids as MDF is currently less than 32.  Plan to send for complete serologic liver workup but otherwise will require ongoing alcoholic cessation moving forward and outpatient GI follow-up.  If serologic liver workup is negative, would also consider outpatient liver biopsy given significant and long standing alkaline phosphatase elevation.    Plan:  Follow-up results of serologic liver workup  Monitor and trend LFTs daily along with INR  Avoid hepatotoxic medications, strongly encourage complete alcohol cessation  And sitter IR liver biopsy outpatient if ALK remains elevated  Additional pain and symptom management per primary team  Will require outpatient GI/hepatology follow-up after discharge

## 2025-05-27 NOTE — ASSESSMENT & PLAN NOTE
Hyponatremia secondary to hyperglycemia and progressive liver disease    Results from last 7 days   Lab Units 05/27/25  0430 05/26/25  0541 05/24/25  1639 05/24/25  0613   SODIUM mmol/L 132* 133* 132* 135

## 2025-05-27 NOTE — PROGRESS NOTES
Progress Note - Gastroenterology   Name: Wes Araujo 55 y.o. male I MRN: [id number]  Unit/Bed#: [place] [address] -01 I Date of Admission: 5/23/2025   Date of Service: 5/27/2025 I Hospital Day: 4    Assessment & Plan  Elevated LFTs  55-year-old male with a past medical history of hypertension, CKD, diabetes mellitus type 2, chronic pancreatitis secondary to chronic alcohol use who presented to [facility] on 5/23 with altered mental status likely due to elevated blood glucose.  Fortunately now mental status has improved.  However, patient's LFTs were also significantly elevated on admission.  On admission, AST/ALT 48/51, alkaline phosphatase 1069, total bilirubin 4.18.  INR also elevated to 5.53 but now improved status post vitamin K.  Patient has had LFT elevations in the past.  Admission RUQ US with no acute findings including grossly stable liver and biliary tree.  Prior MRI imaging completed for evaluation of LFT elevation in 2024 also unremarkable.  Suspect that LFT elevations are likely due to longstanding history of alcohol use with a component of alcoholic hepatitis. Suspect that this is superimposed on some degree of fibrosis.  Given improvement in INR, no indication for steroids as MDF is currently less than 32.  Plan to send for complete serologic liver workup but otherwise will require ongoing alcoholic cessation moving forward and outpatient GI follow-up.  If serologic liver workup is negative, would also consider outpatient liver biopsy given significant and long standing alkaline phosphatase elevation.    Plan:  Follow-up results of serologic liver workup  Monitor and trend LFTs daily along with INR  Avoid hepatotoxic medications, strongly encourage complete alcohol cessation  And sitter IR liver biopsy outpatient if ALK remains elevated  Additional pain and symptom management per primary team  Will require outpatient GI/hepatology follow-up after discharge  Toxic metabolic encephalopathy  Patient presenting  with altered mental status.  Likely due to significantly elevated blood glucose from insulin noncompliance.  Fortunately encephalopathy appears to have now improved.  Management per primary team.  Alcohol abuse  Patient also longstanding history of alcohol use.  Suspect that he has some degree of advanced fibrosis.  Recommend complete alcohol cessation moving forward and follow-up outpatient with GI/hepatology.  Acute renal failure superimposed on stage 3 chronic kidney disease (HCC)  Lab Results   Component Value Date    EGFR 28 05/27/2025    EGFR 31 05/26/2025    EGFR 43 05/24/2025    CREATININE 2.47 (H) 05/27/2025    CREATININE 2.23 (H) 05/26/2025    CREATININE 1.72 (H) 05/24/2025     Patient with elevated creatinine on admission which continues to worsen today.  Patient with no history of cirrhosis and therefore this would not be consistent with HRS.  Nephrology currently consulted and will defer management to nephrology and primary teams.    Please contact the SecureChat role for the Gastroenterology service with any questions/concerns.    Subjective   Patient seen evaluated bedside.  Sitting in bed comfortably in no acute distress or visible discomfort.  Denies abdominal pain.  Currently tolerating diet.  Denies nausea/vomiting.  Offers no additional complaints at this time.    Objective :  Temp:  [98.1 °F (36.7 °C)-98.7 °F (37.1 °C)] 98.1 °F (36.7 °C)  HR:  [] 91  BP: (140-164)/(79-91) 157/91  Resp:  [17-18] 18  SpO2:  [96 %-99 %] 99 %    Physical Exam  Constitutional:       General: He is not in acute distress.     Appearance: He is normal weight.   HENT:      Head: Normocephalic.      Nose: Nose normal.     Eyes:      General: Scleral icterus present.       Cardiovascular:      Rate and Rhythm: Normal rate.      Pulses: Normal pulses.   Pulmonary:      Effort: Pulmonary effort is normal.   Abdominal:      General: Abdomen is flat. There is no distension.      Palpations: Abdomen is soft.       Tenderness: There is no abdominal tenderness.     Musculoskeletal:         General: Normal range of motion.      Cervical back: Normal range of motion.     Skin:     General: Skin is warm.      Coloration: Skin is jaundiced. Skin is not pale.     Neurological:      Mental Status: He is alert and oriented to person, place, and time.     Psychiatric:         Mood and Affect: Mood normal.         Behavior: Behavior normal.           Lab Results: I have reviewed the following results:CBC/BMP:   .     05/27/25  0430   WBC 11.67*   HGB 7.2*   HCT 21.8*      SODIUM 132*   K 5.0      CO2 21   BUN 21   CREATININE 2.47*   GLUC 216*    , Creatinine Clearance: Estimated Creatinine Clearance: 29.7 mL/min (A) (by C-G formula based on SCr of 2.47 mg/dL (H))., LFTs:   .     05/27/25  0430   AST 49*   ALT 33   ALB 2.5*   TBILI 7.14*   ALKPHOS 646*    , PTT/INR:  .     05/27/25  0430   INR 1.11        Imaging Results Review: I reviewed radiology reports from this admission including: CT abdomen/pelvis.  Other Study Results Review: No additional pertinent studies reviewed.

## 2025-05-27 NOTE — ASSESSMENT & PLAN NOTE
Lab Results   Component Value Date    HGBA1C 9.6 (H) 05/26/2025     Recent Labs     05/26/25  1105 05/26/25  1726 05/26/25  2118 05/27/25  0729   POCGLU 203* 137 178* 208*     HHNK upon arrival likely related to noncompliance with medications and alcohol use  Restarted on 70/30 5 units twice daily  Continue sliding scale

## 2025-05-27 NOTE — ASSESSMENT & PLAN NOTE
Lab Results   Component Value Date    EGFR 28 05/27/2025    EGFR 31 05/26/2025    EGFR 43 05/24/2025    CREATININE 2.47 (H) 05/27/2025    CREATININE 2.23 (H) 05/26/2025    CREATININE 1.72 (H) 05/24/2025

## 2025-05-27 NOTE — ASSESSMENT & PLAN NOTE
Lab Results   Component Value Date    HGBA1C 9.6 (H) 05/26/2025       Recent Labs     05/26/25  1726 05/26/25  2118 05/27/25  0729 05/27/25  1059   POCGLU 137 178* 208* 150*       Blood Sugar Average: Last 72 hrs:  (P) [id number]  On insulin  Recommend tight glycemic control

## 2025-05-27 NOTE — OCCUPATIONAL THERAPY NOTE
Occupational Therapy Evaluation     Patient Name: Wes Araujo  Today's Date: 5/27/2025  Problem List  Principal Problem:    Toxic metabolic encephalopathy  Active Problems:    Primary hypertension    Type 2 diabetes mellitus with hyperglycemia, with long-term current use of insulin (HCC)    Alcohol abuse    Hyponatremia    History of atrial fibrillation    Acute renal failure superimposed on stage 3 chronic kidney disease (HCC)    Acute liver failure    Electrolyte abnormality    Polish  needed    Acute kidney injury (HCC)    CKD stage 3b, GFR 30-44 ml/min (HCC)    Other specified anemias    Past Medical History  Past Medical History[1]  Past Surgical History  Past Surgical History[2]        05/27/25 2716   Note Type   Note type Evaluation   Pain Assessment   Pain Assessment Tool FLACC   Pain Rating: FLACC (Rest) - Face 0   Pain Rating: FLACC (Rest) - Legs 0   Pain Rating: FLACC (Rest) - Activity 0   Pain Rating: FLACC (Rest) - Cry 0   Pain Rating: FLACC (Rest) - Consolability 0   Score: FLACC (Rest) 0   Restrictions/Precautions   Weight Bearing Precautions Per Order No   Other Precautions Chair Alarm;Bed Alarm;Cognitive;Fall Risk;Hard of hearing   Home Living   Type of Home Apartment  (2nd flr--[address] without railing)   Home Layout One level   Bathroom Shower/Tub Tub/shower unit   Bathroom Toilet Standard   Home Equipment   (denies)   Prior Function   Level of [place] Needs assistance with ADLs;Independent with functional mobility   Lives With Alone   Falls in the last 6 months 1 to 4  (denies falls, but found down with AMS(for this admission))   Lifestyle   Autonomy PTA pt states that he required assistance with his ADLs; states independent with his transfers, ambulation--w/o device; neg falls=denies falls(but found down with AMS for this admission), neg    Reciprocal Relationships supportive parents   Service to Others worked as a    Intrinsic Gratification watching  "TV   Subjective   Subjective \"I don't hear so well.\"   ADL   Where Assessed Edge of bed   Eating Assistance 6  Modified independent   Grooming Assistance 6  Modified Independent   UB Bathing Assistance 5  Supervision/Setup   LB Bathing Assistance 4  Minimal Assistance   UB Dressing Assistance 5  Supervision/Setup   LB Dressing Assistance 4  Minimal Assistance   Toileting Assistance  5  Supervision/Setup   Bed Mobility   Rolling R 6  Modified independent   Rolling L 6  Modified independent   Supine to Sit 6  Modified independent   Sit to Supine 6  Modified independent   Transfers   Sit to Stand 5  Supervision   Additional items Increased time required;Verbal cues   Stand to Sit 5  Supervision   Additional items Increased time required;Verbal cues   Additional Comments bp's=140/79(EOB); SPO2=96% on RA, HR=97bpm   Functional Mobility   Functional Mobility 4  Minimal assistance   Additional Comments x1   Additional items Hand hold assistance   Balance   Static Sitting Good   Dynamic Sitting Fair +   Static Standing Fair   Dynamic Standing Fair -   Activity Tolerance   Activity Tolerance Patient limited by fatigue   Medical Staff Made Aware nsg   RUE Assessment   RUE Assessment WFL   RUE Strength   RUE Overall Strength Within Functional Limits - able to perform ADL tasks with strength  (4/5 throughout)   LUE Assessment   LUE Assessment WFL   LUE Strength   LUE Overall Strength Within Functional Limits - able to perform ADL tasks with strength  (4/5 throughout)   Hand Function   Gross Motor Coordination Functional   Fine Motor Coordination Functional   Sensation   Light Touch No apparent deficits   Proprioception   Proprioception No apparent deficits   Vision-Basic Assessment   Current Vision   (glasses)   Vision - Complex Assessment   Acuity Able to read clock/calendar on wall without difficulty   Psychosocial   Psychosocial (WDL) WDL   Perception   Inattention/Neglect Appears intact   Cognition   Overall Cognitive " "Status Impaired   Arousal/Participation Alert   Attention Attends with cues to redirect   Orientation Level Oriented to person;Disoriented to place;Disoriented to time;Disoriented to situation  (with visual cues(i.e.white board), pt ax3)   Memory Decreased recall of precautions;Decreased short term memory   Following Commands Follows one step commands with increased time or repetition   Comments [place]-speaking only with [place]; [employer]  #[id number]--for part of the evaluation; unable to fully utilize 2* [place]   Assessment   Limitation Decreased ADL status;Decreased UE strength;Decreased Safe judgement during ADL;Decreased cognition;Decreased endurance;Decreased high-level ADLs   Prognosis Fair   Assessment Pt is a 54y/o male admitted to the hospital after being found down with AMS. Pt noted with toxic metabolic encephalopathy, acute renal failure, anemia, and acute liver failure. Pt with PMH ETOH, seizure, A-fib, DM, medical non-compliance. PTA pt states that he required assistance with his ADLs; states independent with his transfers, ambulation--w/o device; neg falls=denies falls(but found down with AMS for this admission), neg . During initial eval, pt demonstrated deficits with his functional balance, functional mobility, ADL status, transfer safety, b/l UE strength, activity tolerance(currently fair=15-20mins), and cognition(i.e.orientation, judgement/safety). Pt would benefit from continued OT tx for the above deficits. 2-5xwk/1-2wks, The patient's raw score on the AM-PAC Daily Activity Inpatient Short Form is 21. A raw score of greater than or equal to 19 suggests the patient may benefit from discharge to home. Please refer to the recommendation of the Occupational Therapist for safe discharge planning.   Goals   Patient Goals \"to go home\"   STG Time Frame   (1-5 days)   Short Term Goal #1 Pt will demonstrate improved activity tolerance to good(20-30mins) and standing tolerance to 3-5mins to assist with " ADLs.   Short Term Goal #2 Pt will tolerate continued cognitive/home-safety assessment and appropriate d/c recommendations will be provided.   Short Term Goal  Pt will demonstrate proper walker/transfer safety 100% of the time.   LTG Time Frame   (5-10 days)   Long Term Goal #1 Pt will demonstrate g/g- balance with all functional activities.   Long Term Goal #2 Pt will demonstrate mod I with their UE and LE bathing/dresssing.   Long Term Goal Pt will independently verbalize 2-3 potential fall risks/transfer safety hazards and their appropriate compensation techniques.   Plan   Treatment Interventions ADL retraining;UE strengthening/ROM;Functional transfer training;Endurance training;Cognitive reorientation;Patient/family training;Equipment evaluation/education;Compensatory technique education;Continued evaluation   Goal Expiration Date 06/06/25   OT Treatment Day 0   OT Frequency   (2-5xwk/1-2wks)   Discharge Recommendation   Rehab Resource Intensity Level, OT III (Minimum Resource Intensity)   AM-PAC Daily Activity Inpatient   Lower Body Dressing 3   Bathing 3   Toileting 3   Upper Body Dressing 4   Grooming 4   Eating 4   Daily Activity Raw Score 21   Daily Activity Standardized Score (Calc for Raw Score >=11) 44.27   AM-PAC Applied Cognition Inpatient   Following a Speech/Presentation 3   Understanding Ordinary Conversation 3   Taking Medications 2   Remembering Where Things Are Placed or Put Away 2   Remembering List of 4-5 Errands 2   Taking Care of Complicated Tasks 2   Applied Cognition Raw Score 14   Applied Cognition Standardized Score 32.02   Chase Alcantara            [1]   Past Medical History:  Diagnosis Date    Alcohol abuse     Chronic pancreatitis (HCC)     Diabetes mellitus (HCC)     Type 2    Non compliance w medication regimen     Pancreatitis     Paroxysmal A-fib (HCC)     Thrombocytopenia (HCC)    [2]   Past Surgical History:  Procedure Laterality Date    INCISION AND DRAINAGE OF WOUND N/A  8/16/2020    Procedure: INCISION AND DRAINAGE (I&D) HEAD/FACE;  Surgeon: Estrada Walton DMD;  Location: [facility];  Service: Maxillofacial    IR BIOPSY BONE MARROW  2/7/2019    REMOVAL OF IMPACTED TOOTH - COMPLETELY BONY N/A 8/16/2020    Procedure: EXTRACTION TEETH MULTIPLE #2, 3;  Surgeon: Estrada Walton DMD;  Location: [facility];  Service: Maxillofacial

## 2025-05-27 NOTE — ASSESSMENT & PLAN NOTE
Acute kidney injury on CKD 3 baseline creatinine approximately 1.5  Related to progressive liver dysfunction and dehydration  Started IV albumin.  Nephrology consulted.    Results from last 7 days   Lab Units 05/27/25  0430 05/26/25  0541 05/24/25  1639 05/24/25  0613 05/24/25  0441 05/23/25  2155 05/23/25  1855   BUN mg/dL 21 16 12 12 12 13 12   CREATININE mg/dL 2.47* 2.23* 1.72* 1.78* 1.72* 1.97* 1.99*   EGFR ml/min/1.73sq m 28 31 43 42 43 37 36

## 2025-05-27 NOTE — ASSESSMENT & PLAN NOTE
ZENAIDA most likely secondary to prerenal azotemia secondary to osmotic diuresis plus some component of intravascular volume depletion in light of hypoalbuminemia plus cholemic nephrosis with subsequent ATN  After review of records it appears that the patient has a baseline Creatinine of 1.5-2.0 mg/dL.  Admission creatinine of 1.99 mg/dL on 5/23.  Creatinine today is at 2.47 mg/dL fortunately continue deteriorate.  check BMP, magnesium, phosphorus in a.m.  Recommend albumin 25 g IV Q6 for 48 hours  Hold off on diuretics  Await renal recovery.  Place on a renal diet when allowed diet order.   CT abdomen with contrast/26/25 no hydronephrosis unremarkable kidneys  Strict I/O.  Daily weights  Urinary retention protocol  Avoid nephrotoxins, adjust meds to appropriate GFR.  Likely has underlying CKD secondary to poorly controlled diabetes plus hypertensive nephrosclerosis plus age-related nephron loss  Outpatient for nephrology follows up with nurse practitioner Tanika last seen 2019

## 2025-05-27 NOTE — PROGRESS NOTES
Progress Note - Hospitalist   Name: Wes Araujo 55 y.o. male I MRN: [id number]  Unit/Bed#: [place] [address] -01 I Date of Admission: 5/23/2025   Date of Service: 5/27/2025 I Hospital Day: 4    Assessment & Plan  Toxic metabolic encephalopathy  History of atrial fibrillation hypertension alcohol liver disease who presented to the hospital for change in mental status found to have hyperglycemia  Admitted to ICU and stabilized  GI consulted for liver injury/failure.  Father reports active consumption of alcohol  Discussed with father.  Mentation much improved and near baseline  Acute renal failure superimposed on stage 3 chronic kidney disease (HCC)  Acute kidney injury on CKD 3 baseline creatinine approximately 1.5  Related to progressive liver dysfunction and dehydration  Started IV albumin.  Nephrology consulted.    Results from last 7 days   Lab Units 05/27/25  0430 05/26/25  0541 05/24/25  1639 05/24/25  0613 05/24/25  0441 05/23/25  2155 05/23/25  1855   BUN mg/dL 21 16 12 12 12 13 12   CREATININE mg/dL 2.47* 2.23* 1.72* 1.78* 1.72* 1.97* 1.99*   EGFR ml/min/1.73sq m 28 31 43 42 43 37 36     Acute liver failure  Evidence of worsening hyperbilirubinemia and coagulopathy  Discussing with gastroenterology for further recommendations  Follow-up on blood cultures to rule out infection    Results from last 7 days   Lab Units 05/27/25  0430 05/26/25  0541 05/25/25  0450 05/24/25  0441 05/23/25  1855   AST U/L 49* 51* 60* 43* 48*   ALT U/L 33 34 37 42 51   TOTAL BILIRUBIN mg/dL 7.14* 3.61* 3.17* 2.93* 4.18*     Results from last 7 days   Lab Units 05/27/25  0430 05/26/25  0541 05/25/25  0450 05/24/25  0441   INR  1.11 5.53* 4.09* 4.50*     Primary hypertension  Continue amlodipine 5 mg twice daily, hydralazine 25 mg 3 times daily  Type 2 diabetes mellitus with hyperglycemia, with long-term current use of insulin (HCC)  Lab Results   Component Value Date    HGBA1C 9.6 (H) 05/26/2025     Recent Labs     05/26/25  1100  25  1726 25  2118 25  0729   POCGLU 203* 137 178* 208*     HHNK upon arrival likely related to noncompliance with medications and alcohol use  Restarted on 70/30 5 units twice daily  Continue sliding scale  Alcohol abuse  History of alcohol use with withdrawal and withdrawal seizures  Father reports no active consumption of alcohol  Continue thiamine and folic acid  History of atrial fibrillation  History of atrial fibrillation on metoprolol  Not on anticoagulation at baseline  Electrolyte abnormality  Hyponatremia secondary to hyperglycemia and progressive liver disease    Results from last 7 days   Lab Units 25  0430 25  0541 25  1639 25  0613   SODIUM mmol/L 132* 133* 132* 135     Romansh  needed   [id number] used today    VTE Pharmacologic Prophylaxis:   Moderate Risk (Score 3-4) - Pharmacological DVT Prophylaxis Contraindicated. Sequential Compression Devices Ordered.    Mobility:   Basic Mobility Inpatient Raw Score: 24  JH-HLM Goal: 8: Walk 250 feet or more  JH-HLM Achieved: 8: Walk 250 feet ot more  JH-HLM Goal achieved. Continue to encourage appropriate mobility.    Patient Centered Rounds: I have performed bedside rounds with nursing staff today.  Discussions with Specialists or Other Care Team Provider: Case management nephrology gastroenterology    Education and Discussions with Family / Patient: Updated  (father) at bedside.    Current Length of Stay: 4 day(s)  Current Patient Status: Inpatient   Certification Statement: The patient will continue to require additional inpatient hospital stay due to kidney and liver injuries  Discharge Plan: Anticipate discharge in >72 hrs to home with home services.    Code Status: Level 1 - Full Code    Subjective   Patient seen and examined.  Ate breakfast.  Having some right upper quadrant pains.  Father at bedside    Objective   Vitals:   Temp (24hrs), Av.5 °F (36.9 °C), Min:98.4  °F (36.9 °C), Max:98.7 °F (37.1 °C)    Temp:  [98.4 °F (36.9 °C)-98.7 °F (37.1 °C)] 98.4 °F (36.9 °C)  HR:  [93-94] 93  Resp:  [17-18] 17  BP: (146-164)/(81-89) 160/88  SpO2:  [97 %-98 %] 98 %  Body mass index is 20.22 kg/m².     Input and Output Summary (last 24 hours):     Intake/Output Summary (Last 24 hours) at 5/27/2025 0945  Last data filed at 5/27/2025 0635  Gross per 24 hour   Intake 720 ml   Output 600 ml   Net 120 ml       Physical Exam  Vitals reviewed.   Constitutional:       General: He is not in acute distress.  HENT:      Head: Atraumatic.     Cardiovascular:      Rate and Rhythm: Regular rhythm.   Pulmonary:      Effort: Pulmonary effort is normal.      Breath sounds: Decreased breath sounds present. No wheezing.   Abdominal:      General: Bowel sounds are normal.      Palpations: Abdomen is soft.      Tenderness: There is no abdominal tenderness.     Musculoskeletal:         General: No tenderness.     Skin:     General: Skin is warm and dry.     Neurological:      General: No focal deficit present.      Mental Status: He is alert.     Psychiatric:         Mood and Affect: Mood normal.       Lines/Drains:  Invasive Devices       Peripheral Intravenous Line  Duration             Peripheral IV 05/24/25 Distal;Left;Upper;Ventral (anterior) Arm 3 days                            Lab Results: I have reviewed the following results:   Results from last 7 days   Lab Units 05/27/25  0430 05/26/25 0541 05/25/25  0450   WBC Thousand/uL 11.67* 12.21* 19.15*   HEMOGLOBIN g/dL 7.2* 8.3* 9.5*   PLATELETS Thousands/uL 261 279 306   MCV fL 94 91 88   TOTAL NEUT ABS Thousand/uL  --   --  15.70*   INR  1.11 5.53* 4.09*     Results from last 7 days   Lab Units 05/27/25  0430 05/26/25  0541 05/25/25  0450 05/24/25  1639   SODIUM mmol/L 132* 133*  --  132*   POTASSIUM mmol/L 5.0 5.3  --  4.9   CHLORIDE mmol/L 105 104  --  101   CO2 mmol/L 21 24  --  22   ANION GAP mmol/L 6 5  --  9   BUN mg/dL 21 16  --  12   CREATININE  mg/dL 2.47* 2.23*  --  1.72*   CALCIUM mg/dL 8.1* 8.3*  --  8.4   ALBUMIN g/dL 2.5* 2.4* 2.5*  --    TOTAL BILIRUBIN mg/dL 7.14* 3.61* 3.17*  --    ALK PHOS U/L 646* 711* 785*  --    ALT U/L 33 34 37  --    AST U/L 49* 51* 60*  --    EGFR ml/min/1.73sq m 28 31  --  43   GLUCOSE RANDOM mg/dL 216* 216*  --  188*     Results from last 7 days   Lab Units 05/26/25  0541 05/25/25  0450 05/24/25  0613 05/24/25  0441 05/23/25  2155   MAGNESIUM mg/dL 2.5 2.6 1.8* 1.8* 1.9   PHOSPHORUS mg/dL 4.6* 4.7* 2.5* 9.3* 1.9*     Results from last 7 days   Lab Units 05/23/25  1855   CK TOTAL U/L 62     Results from last 7 days   Lab Units 05/24/25  0030 05/23/25  2155 05/23/25  1855   HS TNI 0HR ng/L  --   --  13   HS TNI 2HR ng/L  --  14  --    HS TNI 4HR ng/L 16  --   --           Results from last 7 days   Lab Units 05/24/25  0441   PROCALCITONIN ng/ml 1.03*     Results from last 7 days   Lab Units 05/27/25  0729 05/26/25  2118 05/26/25  1726 05/26/25  1105 05/26/25  0727 05/25/25  2104 05/25/25  1604 05/25/25  1112 05/25/25  0702 05/24/25  2210 05/24/25  2019 05/24/25  1646   POC GLUCOSE mg/dl 208* 178* 137 203* 219* 138 159* 130 112 192* 147* 188*     Results from last 7 days   Lab Units 05/26/25  1202   HEMOGLOBIN A1C % 9.6*           Recent Cultures (last 7 days):   Results from last 7 days   Lab Units 05/26/25  1742   BLOOD CULTURE  Received in Microbiology Lab. Culture in Progress.  Received in Microbiology Lab. Culture in Progress.       Imaging:  Reviewed radiology reports from this admission including:  US right upper quadrant  Result Date: 5/23/2025  Impression: Limited exam. Heterogeneous appearance of the pancreas, correlates with fatty replacement and calcifications seen on prior CT, possibly sequela of chronic inflammation/chronic pancreatitis. Liver appears grossly unremarkable. Small volume hypoechoic ascites, and other findings as above. Workstation performed: [id number]     CT head without contrast  Result Date:  5/23/2025  Impression: No acute intracranial abnormality. Workstation performed: [id number]       Last 24 Hours Medication List:     Current Facility-Administered Medications:     albumin human (FLEXBUMIN) 25 % injection 25 g, Q12H JAISON    amLODIPine (NORVASC) tablet 5 mg, BID    folic acid (FOLVITE) tablet 1 mg, Daily    hydrALAZINE (APRESOLINE) injection 10 mg, Q4H PRN    hydrALAZINE (APRESOLINE) tablet 25 mg, Q8H JAISON    insulin aspart protamine-insulin aspart (NovoLOG 70/30) 100 units/mL subcutaneous injection 5 Units, BID AC    insulin lispro (HumALOG/ADMELOG) 100 units/mL subcutaneous injection 1-5 Units, TID AC **AND** Fingerstick Glucose (POCT), TID AC    insulin lispro (HumALOG/ADMELOG) 100 units/mL subcutaneous injection 1-5 Units, HS    ondansetron (ZOFRAN) injection 4 mg, Q4H PRN    thiamine tablet 100 mg, Daily    Administrative Statements   Today, Patient Was Seen By: Bala Leroy, DO  I have spent a total time of 35 minutes in caring for this patient on the day of the visit/encounter including Patient and family education, Counseling / Coordination of care, Documenting in the medical record, Reviewing/placing orders in the medical record (including tests, medications, and/or procedures), Obtaining or reviewing history  , and Communicating with other healthcare professionals .    **Please Note: This note may have been constructed using a voice recognition system.**

## 2025-05-27 NOTE — ASSESSMENT & PLAN NOTE
Patient also longstanding history of alcohol use.  Suspect that he has some degree of advanced fibrosis.  Recommend complete alcohol cessation moving forward and follow-up outpatient with GI/hepatology.

## 2025-05-27 NOTE — ASSESSMENT & PLAN NOTE
History of alcohol use with withdrawal and withdrawal seizures  Father reports no active consumption of alcohol  Continue thiamine and folic acid

## 2025-05-27 NOTE — CONSULTS
Consultation    Nephrology   Wes Araujo 55 y.o. male MRN: [id number]  Unit/Bed#: [place] [address] -01 Encounter: [id number]    History of Present Illness   Physician Requesting Consult: Bala Leroy DO  Reason for Consult : -  55 y.o.  male with pmh of multiple comorbidities including alcohol use disorder, chronic pancreatitis, diabetes, A-fib, hypertension and noncompliance initially presented with changes in mental status and agitation.  During the course of the hospital stay was treated for HHNK and alcohol withdrawal.  Nephrology has been consulted on 5/27/2025 for evaluation and management of abnormal renal parameters.      Assessment & Plan  Acute kidney injury (HCC)  ZENAIDA most likely secondary to prerenal azotemia secondary to osmotic diuresis plus some component of intravascular volume depletion in light of hypoalbuminemia plus cholemic nephrosis with subsequent ATN  After review of records it appears that the patient has a baseline Creatinine of 1.5-2.0 mg/dL.  Admission creatinine of 1.99 mg/dL on 5/23.  Creatinine today is at 2.47 mg/dL fortunately continue deteriorate.  check BMP, magnesium, phosphorus in a.m.  Recommend albumin 25 g IV Q6 for 48 hours  Hold off on diuretics  Await renal recovery.  Place on a renal diet when allowed diet order.   CT abdomen with contrast/26/25 no hydronephrosis unremarkable kidneys  Strict I/O.  Daily weights  Urinary retention protocol  Avoid nephrotoxins, adjust meds to appropriate GFR.  Likely has underlying CKD secondary to poorly controlled diabetes plus hypertensive nephrosclerosis plus age-related nephron loss  Outpatient for nephrology follows up with nurse practitioner Tanika last seen 2019  CKD stage 3b, GFR 30-44 ml/min ([facility])  Lab Results   Component Value Date    EGFR 28 05/27/2025    EGFR 31 05/26/2025    EGFR 43 05/24/2025    CREATININE 2.47 (H) 05/27/2025    CREATININE 2.23 (H) 05/26/2025    CREATININE 1.72 (H) 05/24/2025   After review of records it  appears that the patient has a baseline Creatinine of 1.5-2.0 mg/dL.  Avoid nephrotoxins, adjust meds to appropriate GFR.  Likely has underlying CKD secondary to poorly controlled diabetes plus hypertensive nephrosclerosis plus age-related nephron loss  Outpatient for nephrology follows up with nurse practitioner Tanika last seen 2019    Hyponatremia  Most recent serum sodium 132 mill equivalents corrected for hyperglycemia within normal limits  Check BMP in a.m.  Primary hypertension  Optimize hemodynamic status to avoid renal ischemic injury.  Maintain MAP > 65mmHg  Avoid BP fluctuations.  Home medications: Hydralazine 25 mg p.o. every 8, Norvasc 5 mg p.o. twice daily  Current medications: Norvasc 5 mg p.o. twice daily, hydralazine 25 mg p.o. every 8  Recommend albumin 25 g IV every 6 for 48 hours  Adjusted holding parameters  Type 2 diabetes mellitus with hyperglycemia, with long-term current use of insulin ([facility])  Lab Results   Component Value Date    HGBA1C 9.6 (H) 05/26/2025       Recent Labs     05/26/25  1726 05/26/25  2118 05/27/25  0729 05/27/25  1059   POCGLU 137 178* 208* 150*       Blood Sugar Average: Last 72 hrs:  (P) [id number]  On insulin  Recommend tight glycemic control  Alcohol abuse  Monitor for withdrawal  History of atrial fibrillation  Follow-up with cardiology  Acute liver failure  Follow-up with GI  Rising LFTs and bilirubin levels  Other specified anemias  most recent hemoglobin at 7.2 g/dL  Maintain hemoglobin greater than 8 grams/deciliter  Follow-up with primary team can consider PRBC transfusion if continues to drop      Thanks for the consult  Will continue to follow  Please call with questions/ concerns.  Above-mentioned orders and Plan in terms of placement albumin every 6, just holding parameters for Norvasc avoid diuretics were discussed with the team in depth and they agree.  Please secure chat the Nephrologist on call for this campus if you have any Nephrological  questions or concerns.    Bibi Solis MD, [facility], 5/27/2025, 1:09 PM          HISTORY OF PRESENT ILLNESS:   Wes Araujo is a 55 y.o.  male with pmh of multiple comorbidities including alcohol use disorder, chronic pancreatitis, diabetes, A-fib, hypertension and noncompliance initially presented with changes in mental status and agitation.  During the course of the hospital stay was treated for HHNK and alcohol withdrawal.  Nephrology has been consulted on 5/27/2025 for evaluation and management of abnormal renal parameters.  Review of record shows baseline creatinine 1.5 to 2 mg/dL admitted with creatinine 1.99 mg/dL on 5/23 creatinine today 2.47 mg/dL.  Patient has had elevated glucose levels.  Seen and examined in his room language line used for translation denies any major complaints other than just mild abdominal pain.  Nuys NSAID use.  As per family patient had increased alcohol intake prior to admission.  History obtained from the patient    Inpatient consult to Nephrology  Consult performed by: Bibi Solis MD  Consult ordered by: Bala Leroy DO          Review of Systems   Constitutional:  Positive for fatigue. Negative for appetite change, chills and fever.   HENT:  Negative for congestion.    Respiratory:  Negative for cough and shortness of breath.    Cardiovascular:  Negative for leg swelling.   Gastrointestinal:  Positive for abdominal pain.   Genitourinary:  Negative for flank pain.   Musculoskeletal:  Negative for back pain.   Neurological:  Negative for headaches.   Psychiatric/Behavioral:  Negative for agitation and confusion.    All other systems reviewed and are negative.       Historical Information   Problem List[1]  Past Medical History[2]  Past Surgical History[3]  Social History   Social History     Substance and Sexual Activity   Alcohol Use Yes     Social History     Substance and Sexual Activity   Drug Use Not Currently    Types: Cocaine     Tobacco Use History[4]  Family  "History[5]    Meds/Allergies   all current active meds have been reviewed, current meds:   Current Facility-Administered Medications:     albumin human (FLEXBUMIN) 25 % injection 25 g, Q6H    amLODIPine (NORVASC) tablet 5 mg, BID    folic acid (FOLVITE) tablet 1 mg, Daily    hydrALAZINE (APRESOLINE) injection 10 mg, Q4H PRN    hydrALAZINE (APRESOLINE) tablet 25 mg, Q8H JAISON    insulin aspart protamine-insulin aspart (NovoLOG 70/30) 100 units/mL subcutaneous injection 5 Units, BID AC    insulin lispro (HumALOG/ADMELOG) 100 units/mL subcutaneous injection 1-5 Units, TID AC **AND** Fingerstick Glucose (POCT), TID AC    insulin lispro (HumALOG/ADMELOG) 100 units/mL subcutaneous injection 1-5 Units, HS    ondansetron (ZOFRAN) injection 4 mg, Q4H PRN    thiamine tablet 100 mg, Daily, and PTA meds:   Prior to Admission Medications   Prescriptions Last Dose Informant Patient Reported? Taking?   Alcohol Swabs 70 % PADS   No No   Sig: May substitute brand based on insurance coverage. Check glucose TID.   Blood Glucose Monitoring Suppl (OneTouch Verio Reflect) w/Device KIT   No No   Sig: May substitute brand based on insurance coverage. Check glucose TID.   Insulin Syringe-Needle U-100 (BD Insulin Syringe U/F) 31G X 5/16\" 0.3 ML MISC   No No   Sig: For use with insulin. Pharmacy may dispense brand covered by insurance.   Multiple Vitamin (multivitamin) tablet   No No   Sig: Take 1 tablet by mouth daily   OneTouch Delica Lancets 33G MISC   No No   Sig: May substitute brand based on insurance coverage. Check glucose TID.   amLODIPine (NORVASC) 5 mg tablet   No No   Sig: Take 1 tablet (5 mg total) by mouth 2 (two) times a day   folic acid ( Folic Acid) 1 mg tablet   No No   Sig: Take 1 tablet (1 mg total) by mouth daily   glucose blood (OneTouch Verio) test strip   No No   Sig: May substitute brand based on insurance coverage. Check glucose TID.   hydrALAZINE (APRESOLINE) 25 mg tablet   No No   Sig: Take 1 tablet (25 mg total) " "by mouth every 8 (eight) hours   insulin NPH-insulin regular (HumuLIN 70/30) 100 units/mL subcutaneous injection   No No   Sig: Inject 5 Units under the skin 2 (two) times a day before meals Do not give yourself insulin if you are not going to eat.   pancrelipase, Lip-Prot-Amyl, (CREON) 24,000 units   No No   Sig: Take 24,000 units of lipase by mouth 3 (three) times a day with meals   thiamine 50 MG tablet   No No   Sig: Take 1 tablet (50 mg total) by mouth daily      Facility-Administered Medications: None       Scheduled Meds:  Current Facility-Administered Medications   Medication Dose Route Frequency Provider Last Rate    Albumin 25%  25 g Intravenous Q12H JAISON Bala Leroy, DO      amLODIPine  5 mg Oral BID Veronica Phyllis Petersen, CRNP      folic acid  1 mg Oral Daily Bala Leroy, DO      hydrALAZINE  10 mg Intravenous Q4H PRN Veronica Phyllis Petersen, CRNP      hydrALAZINE  25 mg Oral Q8H JAISON Veronica Phyllis Petersen, CRNP      insulin aspart protamine-insulin aspart  5 Units Subcutaneous BID AC Veronica Phyllis Petersen, CRNP      insulin lispro  1-5 Units Subcutaneous TID AC Veronica Phyllis Petersen, CRNP      insulin lispro  1-5 Units Subcutaneous HS Veronica Phyllis Petersen, CRNP      ondansetron  4 mg Intravenous Q4H PRN Bala Leroy, DO      thiamine  100 mg Oral Daily Bala Leroy, DO         PRN Meds:.  hydrALAZINE    ondansetron    Continuous Infusions:     Allergies[6]      Invasive Devices:     Invasive Devices       Peripheral Intravenous Line  Duration             Peripheral IV 25 Distal;Left;Upper;Ventral (anterior) Arm 3 days                      PHYSICAL EXAM  /83   Pulse 100   Temp 98.4 °F (36.9 °C)   Resp 17   Ht 5' 9\" (1.753 m)   Wt 62.1 kg (136 lb 14.5 oz)   SpO2 96%   BMI 20.22 kg/m²   Temp (24hrs), Av.5 °F (36.9 °C), Min:98.4 °F (36.9 °C), Max:98.7 °F (37.1 °C)        Intake/Output Summary (Last 24 hours) at 2025 1309  Last data filed at 2025 0635  Gross per 24 hour   Intake 480 ml   Output 250 ml   Net " 230 ml       I/O last 24 hours:  In: 900 [P.O.:900]  Out: 600 [Urine:600]          Current Weight: Weight - Scale: 62.1 kg (136 lb 14.5 oz)  First Weight: Weight - Scale: 55.4 kg (122 lb 2.2 oz)  Physical Exam  Vitals reviewed.   Constitutional:       General: He is not in acute distress.     Appearance: Normal appearance. He is normal weight. He is not toxic-appearing.   HENT:      Head: Normocephalic and atraumatic.      Mouth/Throat:      Mouth: Mucous membranes are moist.      Pharynx: No oropharyngeal exudate.     Eyes:      General: No scleral icterus.      Cardiovascular:      Rate and Rhythm: Normal rate.   Pulmonary:      Effort: No respiratory distress.      Breath sounds: No wheezing.   Abdominal:      General: There is no distension.      Tenderness: There is no abdominal tenderness.     Musculoskeletal:         General: No swelling.      Cervical back: No rigidity.     Skin:     Coloration: Skin is not jaundiced.     Neurological:      General: No focal deficit present.      Mental Status: He is alert and oriented to person, place, and time.     Psychiatric:         Mood and Affect: Mood normal.         Behavior: Behavior normal.           LABORATORY:    Results from last 7 days   Lab Units 05/27/25  0430 05/26/25  0541 05/25/25  0450 05/24/25  1639 05/24/25  0613 05/24/25  0441 05/23/25  2155 05/23/25  1855   WBC Thousand/uL 11.67* 12.21* 19.15*  --   --  20.48*  --  11.35*   HEMOGLOBIN g/dL 7.2* 8.3* 9.5*  --   --  8.6*  --  9.7*   HEMATOCRIT % 21.8* 25.0* 27.4*  --   --  25.5*  --  28.9*   PLATELETS Thousands/uL 261 279 306  --   --  278  --  220   POTASSIUM mmol/L 5.0 5.3  --  4.9 3.8 6.6* 3.4* 3.8   CHLORIDE mmol/L 105 104  --  101 104 102 97 92*   CO2 mmol/L 21 24  --  22 22 20* 23 23   BUN mg/dL 21 16  --  12 12 12 13 12   CREATININE mg/dL 2.47* 2.23*  --  1.72* 1.78* 1.72* 1.97* 1.99*   CALCIUM mg/dL 8.1* 8.3*  --  8.4 8.6 8.5 8.2* 8.6   MAGNESIUM mg/dL  --  2.5 2.6  --  1.8* 1.8* 1.9  --   "  PHOSPHORUS mg/dL  --  4.6* 4.7*  --  2.5* 9.3* 1.9*  --       rest all reviewed    RADIOLOGY:  US right upper quadrant   Final Result by Solomon Spears DO (05/23 2314)      Limited exam.      Heterogeneous appearance of the pancreas, correlates with fatty replacement and calcifications seen on prior CT, possibly sequela of chronic inflammation/chronic pancreatitis.      Liver appears grossly unremarkable.      Small volume hypoechoic ascites, and other findings as above.      Workstation performed: [id number]         CT head without contrast   Final Result by Eleuterio Campa MD (05/23 2103)      No acute intracranial abnormality.                  Workstation performed: [id number]         XR chest 1 view    (Results Pending)     Rest all reviewed    Portions of the record may have been created with voice recognition software. Occasional wrong word or \"sound a like\" substitutions may have occurred due to the inherent limitations of voice recognition software. Read the chart carefully and recognize, using context, where substitutions have occurred.If you have any questions, please contact the dictating provider.       [1]   Patient Active Problem List  Diagnosis    Primary hypertension    Type 2 diabetes mellitus with hyperglycemia, with long-term current use of insulin (HCC)    Alcohol abuse    Paroxysmal A-fib (HCC)    ZENAIDA (acute kidney injury) (HCC)    Hyponatremia    History of atrial fibrillation    Anemia    Acute renal failure superimposed on stage 3 chronic kidney disease (HCC)    Bilateral hearing loss    Seizure (HCC)    Toxic metabolic encephalopathy    Alcohol-induced chronic pancreatitis (HCC)    CKD stage 3a, GFR 45-59 ml/min (HCC)    Abdominal pain    Acute liver failure    Electrolyte abnormality    Beninese  needed   [2]   Past Medical History:  Diagnosis Date    Alcohol abuse     Chronic pancreatitis (HCC)     Diabetes mellitus (HCC)     Type 2    Non compliance w " medication regimen     Pancreatitis     Paroxysmal A-fib (HCC)     Thrombocytopenia (HCC)    [3]   Past Surgical History:  Procedure Laterality Date    INCISION AND DRAINAGE OF WOUND N/A 8/16/2020    Procedure: INCISION AND DRAINAGE (I&D) HEAD/FACE;  Surgeon: Estrada Walton DMD;  Location: [facility];  Service: Maxillofacial    IR BIOPSY BONE MARROW  2/7/2019    REMOVAL OF IMPACTED TOOTH - COMPLETELY BONY N/A 8/16/2020    Procedure: EXTRACTION TEETH MULTIPLE #2, 3;  Surgeon: Estrada Walton DMD;  Location: [facility];  Service: Maxillofacial   [4]   Social History  Tobacco Use   Smoking Status Former    Passive exposure: Past   Smokeless Tobacco Never   [5]   Family History  Problem Relation Name Age of Onset    Diabetes Mother      Hypertension Mother      Hyperlipidemia Father     [6] No Known Allergies

## 2025-05-27 NOTE — PLAN OF CARE
Problem: OCCUPATIONAL THERAPY ADULT  Goal: Performs self-care activities at highest level of function for planned discharge setting.  See evaluation for individualized goals.  Description: Treatment Interventions: ADL retraining, UE strengthening/ROM, Functional transfer training, Endurance training, Cognitive reorientation, Patient/family training, Equipment evaluation/education, Compensatory technique education, Continued evaluation          See flowsheet documentation for full assessment, interventions and recommendations.   Note: Limitation: Decreased ADL status, Decreased UE strength, Decreased Safe judgement during ADL, Decreased cognition, Decreased endurance, Decreased high-level ADLs  Prognosis: Fair  Assessment: Pt is a 54y/o male admitted to the hospital after being found down with AMS. Pt noted with toxic metabolic encephalopathy, acute renal failure, anemia, and acute liver failure. Pt with PMH ETOH, seizure, A-fib, DM, medical non-compliance. PTA pt states that he required assistance with his ADLs; states independent with his transfers, ambulation--w/o device; neg falls=denies falls(but found down with AMS for this admission), neg . During initial eval, pt demonstrated deficits with his functional balance, functional mobility, ADL status, transfer safety, b/l UE strength, activity tolerance(currently fair=15-20mins), and cognition(i.e.orientation, judgement/safety). Pt would benefit from continued OT tx for the above deficits. 2-5xwk/1-2wks, The patient's raw score on the -PAC Daily Activity Inpatient Short Form is 21. A raw score of greater than or equal to 19 suggests the patient may benefit from discharge to home. Please refer to the recommendation of the Occupational Therapist for safe discharge planning.     Rehab Resource Intensity Level, OT: III (Minimum Resource Intensity)

## 2025-05-27 NOTE — ASSESSMENT & PLAN NOTE
Lab Results   Component Value Date    EGFR 28 05/27/2025    EGFR 31 05/26/2025    EGFR 43 05/24/2025    CREATININE 2.47 (H) 05/27/2025    CREATININE 2.23 (H) 05/26/2025    CREATININE 1.72 (H) 05/24/2025     Patient with elevated creatinine on admission which continues to worsen today.  Patient with no history of cirrhosis and therefore this would not be consistent with HRS.  Nephrology currently consulted and will defer management to nephrology and primary teams.

## 2025-05-27 NOTE — PLAN OF CARE
Problem: PAIN - ADULT  Goal: Verbalizes/displays adequate comfort level or baseline comfort level  Description: Interventions:  - Encourage patient to monitor pain and request assistance  - Assess pain using appropriate pain scale  - Administer analgesics as ordered based on type and severity of pain and evaluate response  - Implement non-pharmacological measures as appropriate and evaluate response  - Consider cultural and social influences on pain and pain management  - Notify physician/advanced practitioner if interventions unsuccessful or patient reports new pain  - Educate patient/family on pain management process including their role and importance of  reporting pain   - Provide non-pharmacologic/complimentary pain relief interventions  Outcome: Progressing     Problem: INFECTION - ADULT  Goal: Absence or prevention of progression during hospitalization  Description: INTERVENTIONS:  - Assess and monitor for signs and symptoms of infection  - Monitor lab/diagnostic results  - Monitor all insertion sites, i.e. indwelling lines, tubes, and drains  - Monitor endotracheal if appropriate and nasal secretions for changes in amount and color  - [place] appropriate cooling/warming therapies per order  - Administer medications as ordered  - Instruct and encourage patient and family to use good hand hygiene technique  - Identify and instruct in appropriate isolation precautions for identified infection/condition  Outcome: Progressing  Goal: Absence of fever/infection during neutropenic period  Description: INTERVENTIONS:  - Monitor WBC  - Perform strict hand hygiene  - Limit to healthy visitors only  - No plants, dried, fresh or silk flowers with mccl[place] in patient room  Outcome: Progressing     Problem: SAFETY ADULT  Goal: Patient will remain free of falls  Description: INTERVENTIONS:  - Educate patient/family on patient safety including physical limitations  - Instruct patient to call for assistance with activity   -  Consider consulting OT/PT to assist with strengthening/mobility based on AM PAC & JH-HLM score  - Consult OT/PT to assist with strengthening/mobility   - Keep Call bell within reach  - Keep bed low and locked with side rails adjusted as appropriate  - Keep care items and personal belongings within reach  - Initiate and maintain comfort rounds  - Make Fall Risk Sign visible to staff  - Offer Toileting every 2 Hours, in advance of need  - Initiate/Maintain bed alarm  - Obtain necessary fall risk management equipment: alarms  - Apply yellow socks and bracelet for high fall risk patients  - Consider moving patient to room near nurses station  Outcome: Progressing  Goal: Maintain or return to baseline ADL function  Description: INTERVENTIONS:  -  Assess patient's ability to carry out ADLs; assess patient's baseline for ADL function and identify physical deficits which impact ability to perform ADLs (bathing, care of mouth/teeth, toileting, grooming, dressing, etc.)  - Assess/evaluate cause of self-care deficits   - Assess range of motion  - Assess patient's mobility; develop plan if impaired  - Assess patient's need for assistive devices and provide as appropriate  - Encourage maximum independence but intervene and supervise when necessary  - Involve family in performance of ADLs  - Assess for home care needs following discharge   - Consider OT consult to assist with ADL evaluation and planning for discharge  - Provide patient education as appropriate  - Monitor functional capacity and physical performance, use of AM PAC & JH-HLM   - Monitor gait, balance and fatigue with ambulation    Outcome: Progressing  Goal: Maintains/Returns to pre admission functional level  Description: INTERVENTIONS:  - Perform AM-PAC 6 Click Basic Mobility/ Daily Activity assessment daily.  - Set and communicate daily mobility goal to care team and patient/family/caregiver.   - Collaborate with rehabilitation services on mobility goals if  consulted  - Perform Range of Motion 4 times a day.  - Reposition patient every 2 hours.  - Dangle patient 3 times a day  - Stand patient 3 times a day  - Ambulate patient 3 times a day  - Out of bed to chair 3 times a day   - Out of bed for meals 3 times a day  - Out of bed for toileting  - Record patient progress and toleration of activity level   Outcome: Progressing     Problem: DISCHARGE PLANNING  Goal: Discharge to home or other facility with appropriate resources  Description: INTERVENTIONS:  - Identify barriers to discharge w/patient and caregiver  - Arrange for needed discharge resources and transportation as appropriate  - Identify discharge learning needs (meds, wound care, etc.)  - Arrange for interpretive services to assist at discharge as needed  - Refer to Case Management Department for coordinating discharge planning if the patient needs post-hospital services based on physician/advanced practitioner order or complex needs related to functional status, cognitive ability, or social support system  Outcome: Progressing     Problem: Knowledge Deficit  Goal: Patient/family/caregiver demonstrates understanding of disease process, treatment plan, medications, and discharge instructions  Description: Complete learning assessment and assess knowledge base.  Interventions:  - Provide teaching at level of understanding  - Provide teaching via preferred learning methods  Outcome: Progressing     Problem: NEUROSENSORY - ADULT  Goal: Achieves stable or improved neurological status  Description: INTERVENTIONS  - Monitor and report changes in neurological status  - Monitor vital signs such as temperature, blood pressure, glucose, and any other labs ordered   - Initiate measures to prevent increased intracranial pressure  - Monitor for seizure activity and implement precautions if appropriate      Outcome: Progressing  Goal: Remains free of injury related to seizures activity  Description: INTERVENTIONS  - Maintain  airway, patient safety  and administer oxygen as ordered  - Monitor patient for seizure activity, document and report duration and description of seizure to physician/advanced practitioner  - If seizure occurs,  ensure patient safety during seizure  - Reorient patient post seizure  - Seizure pads on all 4 side rails  - Instruct patient/family to notify RN of any seizure activity including if an aura is experienced  - Instruct patient/family to call for assistance with activity based on nursing assessment  - Administer anti-seizure medications if ordered    Outcome: Progressing  Goal: Achieves maximal functionality and self care  Description: INTERVENTIONS  - Monitor swallowing and airway patency with patient fatigue and changes in neurological status  - Encourage and assist patient to increase activity and self care.   - Encourage visually impaired, hearing impaired and aphasic patients to use assistive/communication devices  Outcome: Progressing     Problem: CARDIOVASCULAR - ADULT  Goal: Maintains optimal cardiac output and hemodynamic stability  Description: INTERVENTIONS:  - Monitor I/O, vital signs and rhythm  - Monitor for S/S and trends of decreased cardiac output  - Administer and titrate ordered vasoactive medications to optimize hemodynamic stability  - Assess quality of pulses, skin color and temperature  - Assess for signs of decreased coronary artery perfusion  - Instruct patient to report change in severity of symptoms  Outcome: Progressing  Goal: Absence of cardiac dysrhythmias or at baseline rhythm  Description: INTERVENTIONS:  - Continuous cardiac monitoring, vital signs, obtain 12 lead EKG if ordered  - Administer antiarrhythmic and heart rate control medications as ordered  - Monitor electrolytes and administer replacement therapy as ordered  Outcome: Progressing     Problem: RESPIRATORY - ADULT  Goal: Achieves optimal ventilation and oxygenation  Description: INTERVENTIONS:  - Assess for changes  in respiratory status  - Assess for changes in mentation and behavior  - Position to facilitate oxygenation and minimize respiratory effort  - Oxygen administered by appropriate delivery if ordered  - Initiate smoking cessation education as indicated  - Encourage broncho-pulmonary hygiene including cough, deep breathe, Incentive Spirometry  - Assess the need for suctioning and aspirate as needed  - Assess and instruct to report SOB or any respiratory difficulty  - Respiratory Therapy support as indicated  Outcome: Progressing     Problem: GASTROINTESTINAL - ADULT  Goal: Minimal or absence of nausea and/or vomiting  Description: INTERVENTIONS:  - Administer IV fluids if ordered to ensure adequate hydration  - Maintain NPO status until nausea and vomiting are resolved  - Nasogastric tube if ordered  - Administer ordered antiemetic medications as needed  - Provide nonpharmacologic comfort measures as appropriate  - Advance diet as tolerated, if ordered  - Consider nutrition services referral to assist patient with adequate nutrition and appropriate food choices  Outcome: Progressing  Goal: Maintains or returns to baseline bowel function  Description: INTERVENTIONS:  - Assess bowel function  - Encourage oral fluids to ensure adequate hydration  - Administer IV fluids if ordered to ensure adequate hydration  - Administer ordered medications as needed  - Encourage mobilization and activity  - Consider nutritional services referral to assist patient with adequate nutrition and appropriate food choices  Outcome: Progressing  Goal: Maintains adequate nutritional intake  Description: INTERVENTIONS:  - Monitor percentage of each meal consumed  - Identify factors contributing to decreased intake, treat as appropriate  - Assist with meals as needed  - Monitor I&O, weight, and lab values if indicated  - Obtain nutrition services referral as needed  Outcome: Progressing  Goal: Establish and maintain optimal ostomy  function  Description: INTERVENTIONS:  - Assess bowel function  - Encourage oral fluids to ensure adequate hydration  - Administer IV fluids if ordered to ensure adequate hydration   - Administer ordered medications as needed  - Encourage mobilization and activity  - Nutrition services referral to assist patient with appropriate food choices  - Assess stoma site  - Consider wound care consult   Outcome: Progressing  Goal: Oral mucous membranes remain intact  Description: INTERVENTIONS  - Assess oral mucosa and hygiene practices  - Implement preventative oral hygiene regimen  - Implement oral medicated treatments as ordered  - Initiate Nutrition services referral as needed  Outcome: Progressing     Problem: GENITOURINARY - ADULT  Goal: Maintains or returns to baseline urinary function  Description: INTERVENTIONS:  - Assess urinary function  - Encourage oral fluids to ensure adequate hydration if ordered  - Administer IV fluids as ordered to ensure adequate hydration  - Administer ordered medications as needed  - Offer frequent toileting  - Follow urinary retention protocol if ordered  Outcome: Progressing  Goal: Absence of urinary retention  Description: INTERVENTIONS:  - Assess patient’s ability to void and empty bladder  - Monitor I/O  - Bladder scan as needed  - Discuss with physician/AP medications to alleviate retention as needed  - Discuss catheterization for long term situations as appropriate  Outcome: Progressing  Goal: Urinary catheter remains patent  Description: INTERVENTIONS:  - Assess patency of urinary catheter  - If patient has a chronic ibrahim, consider changing catheter if non-functioning  - Follow guidelines for intermittent irrigation of non-functioning urinary catheter  Outcome: Progressing     Problem: METABOLIC, FLUID AND ELECTROLYTES - ADULT  Goal: Electrolytes maintained within normal limits  Description: INTERVENTIONS:  - Monitor labs and assess patient for signs and symptoms of electrolyte  imbalances  - Administer electrolyte replacement as ordered  - Monitor response to electrolyte replacements, including repeat lab results as appropriate  - Instruct patient on fluid and nutrition as appropriate  Outcome: Progressing  Goal: Fluid balance maintained  Description: INTERVENTIONS:  - Monitor labs   - Monitor I/O and WT  - Instruct patient on fluid and nutrition as appropriate  - Assess for signs & symptoms of volume excess or deficit  Outcome: Progressing  Goal: Glucose maintained within target range  Description: INTERVENTIONS:  - Monitor Blood Glucose as ordered  - Assess for signs and symptoms of hyperglycemia and hypoglycemia  - Administer ordered medications to maintain glucose within target range  - Assess nutritional intake and initiate nutrition service referral as needed  Outcome: Progressing     Problem: SKIN/TISSUE INTEGRITY - ADULT  Goal: Skin Integrity remains intact(Skin Breakdown Prevention)  Description: Assess:  -Perform Jose assessment every shift  -Clean and moisturize skin every shift  -Inspect skin when repositioning, toileting, and assisting with ADLS  -Assess under medical devices such as IV every shift  -Assess extremities for adequate circulation and sensation     Bed Management:  -Have minimal linens on bed & keep smooth, unwrinkled  -Change linens as needed when moist or perspiring  -Avoid sitting or lying in one position for more than 2 hours while in bed  -Keep HOB at 30 degrees     Toileting:  -Offer bedside commode  -Assess for incontinence every shift  -Use incontinent care products after each incontinent episode such as foam cleanser    Activity:  -Mobilize patient 3 times a day  -Encourage activity and walks on unit  -Encourage or provide ROM exercises   -Turn and reposition patient every 2 Hours  -Use appropriate equipment to lift or move patient in bed  -Instruct/ Assist with weight shifting every hour when out of bed in chair  -Consider limitation of chair time 1  hour intervals    Skin Care:  -Avoid use of baby powder, tape, friction and shearing, hot water or constrictive clothing  -Relieve pressure over bony prominences using pillows  -Do not massage red bony areas    Next Steps:  -Teach patient strategies to minimize risks such as skin breakdown   -Consider consults to  interdisciplinary teams such as wound care  Outcome: Progressing  Goal: Incision(s), wounds(s) or drain site(s) healing without S/S of infection  Description: INTERVENTIONS  - Assess and document dressing, incision, wound bed, drain sites and surrounding tissue  - Provide patient and family education  - Perform skin care/dressing changes every shift  Outcome: Progressing  Goal: Pressure injury heals and does not worsen  Description: Interventions:  - Implement low air loss mattress or specialty surface (Criteria met)  - Apply silicone foam dressing  - Instruct/assist with weight shifting every 30  minutes when in chair   - Limit chair time to 1 hour intervals  - Use special pressure reducing interventions such as cushion when in chair   - Apply fecal or urinary incontinence containment device   - Perform passive or active ROM every shift  - Turn and reposition patient & offload bony prominences every 2 hours   - Utilize friction reducing device or surface for transfers   - Consider consults to  interdisciplinary teams such as wound care  - Use incontinent care products after each incontinent episode such as foam cleanser  - Consider nutrition services referral as needed  Outcome: Progressing     Problem: HEMATOLOGIC - ADULT  Goal: Maintains hematologic stability  Description: INTERVENTIONS  - Assess for signs and symptoms of bleeding or hemorrhage  - Monitor labs  - Administer supportive blood products/factors as ordered and appropriate  Outcome: Progressing     Problem: MUSCULOSKELETAL - ADULT  Goal: Maintain or return mobility to safest level of function  Description: INTERVENTIONS:  - Assess patient's  ability to carry out ADLs; assess patient's baseline for ADL function and identify physical deficits which impact ability to perform ADLs (bathing, care of mouth/teeth, toileting, grooming, dressing, etc.)  - Assess/evaluate cause of self-care deficits   - Assess range of motion  - Assess patient's mobility  - Assess patient's need for assistive devices and provide as appropriate  - Encourage maximum independence but intervene and supervise when necessary  - Involve family in performance of ADLs  - Assess for home care needs following discharge   - Consider OT consult to assist with ADL evaluation and planning for discharge  - Provide patient education as appropriate  Outcome: Progressing  Goal: Maintain proper alignment of affected body part  Description: INTERVENTIONS:  - Support, maintain and protect limb and body alignment  - Provide patient/ family with appropriate education  Outcome: Progressing     Problem: SAFETY,RESTRAINT: NV/NON-SELF DESTRUCTIVE BEHAVIOR  Goal: Remains free of harm/injury (restraint for non violent/non self-detsructive behavior)  Description: INTERVENTIONS:  - Instruct patient/family regarding restraint use   - Assess and monitor physiologic and psychological status   - Provide interventions and comfort measures to meet assessed patient needs   - Identify and implement measures to help patient regain control  - Assess readiness for release of restraint   Outcome: Progressing  Goal: Returns to optimal restraint-free functioning  Description: INTERVENTIONS:  - Assess the patient's behavior and symptoms that indicate continued need for restraint  - Identify and implement measures to help patient regain control  - Assess readiness for release of restraint   Outcome: Progressing     Problem: Prexisting or High Potential for Compromised Skin Integrity  Goal: Skin integrity is maintained or improved  Description: INTERVENTIONS:  - Identify patients at risk for skin breakdown  - Assess and monitor  skin integrity including under and around medical devices   - Assess and monitor nutrition and hydration status  - Monitor labs  - Assess for incontinence   - Turn and reposition patient  - Assist with mobility/ambulation  - Relieve pressure over keith prominences   - Avoid friction and shearing  - Provide appropriate hygiene as needed including keeping skin clean and dry  - Evaluate need for skin moisturizer/barrier cream  - Collaborate with interdisciplinary team  - Patient/family teaching  - Consider wound care consult    Assess:  - Review Jose scale daily  - Clean and moisturize skin every shift  - Inspect skin when repositioning, toileting, and assisting with ADLS  - Assess under medical devices such as IV every shift  - Assess extremities for adequate circulation and sensation     Bed Management:  - Have minimal linens on bed & keep smooth, unwrinkled  - Change linens as needed when moist or perspiring  - Avoid sitting or lying in one position for more than 2 hours while in bed?Keep HOB at 30 degrees   - Toileting:  - Offer bedside commode  - Assess for incontinence every shift  - Use incontinent care products after each incontinent episode such as foam cleanser    Activity:  - Mobilize patient 3 times a day  - Encourage activity and walks on unit  - Encourage or provide ROM exercises   - Turn and reposition patient every 2 Hours  - Use appropriate equipment to lift or move patient in bed  - Instruct/ Assist with weight shifting every hour when out of bed in chair  - Consider limitation of chair time 12 hour intervals    Skin Care:  - Avoid use of baby powder, tape, friction and shearing, hot water or constrictive clothing  - Relieve pressure over bony prominences using pillows  - Do not massage red bony areas    Next Steps:  - Teach patient strategies to minimize risks such as skin breakdown  - Consider consults to  interdisciplinary teams such as wound care  Outcome: Progressing     Problem:  Nutrition/Hydration-ADULT  Goal: Nutrient/Hydration intake appropriate for improving, restoring or maintaining nutritional needs  Description: Monitor and assess patient's nutrition/hydration status for malnutrition. Collaborate with interdisciplinary team and initiate plan and interventions as ordered.  Monitor patient's weight and dietary intake as ordered or per policy. Utilize nutrition screening tool and intervene as necessary. Determine patient's food preferences and provide high-protein, high-caloric foods as appropriate.     INTERVENTIONS:  - Monitor oral intake, urinary output, labs, and treatment plans  - Assess nutrition and hydration status and recommend course of action  - Evaluate amount of meals eaten  - Assist patient with eating if necessary   - Allow adequate time for meals  - Recommend/ encourage appropriate diets, oral nutritional supplements, and vitamin/mineral supplements  - Order, calculate, and assess calorie counts as needed  - Recommend, monitor, and adjust tube feedings and TPN/PPN based on assessed needs  - Assess need for intravenous fluids  - Provide specific nutrition/hydration education as appropriate  - Include patient/family/caregiver in decisions related to nutrition  Outcome: Progressing     Problem: MOBILITY - ADULT  Goal: Maintain or return to baseline ADL function  Description: INTERVENTIONS:  -  Assess patient's ability to carry out ADLs; assess patient's baseline for ADL function and identify physical deficits which impact ability to perform ADLs (bathing, care of mouth/teeth, toileting, grooming, dressing, etc.)  - Assess/evaluate cause of self-care deficits   - Assess range of motion  - Assess patient's mobility; develop plan if impaired  - Assess patient's need for assistive devices and provide as appropriate  - Encourage maximum independence but intervene and supervise when necessary  - Involve family in performance of ADLs  - Assess for home care needs following  discharge   - Consider OT consult to assist with ADL evaluation and planning for discharge  - Provide patient education as appropriate  - Monitor functional capacity and physical performance, use of AM PAC & -HLM   - Monitor gait, balance and fatigue with ambulation    Outcome: Progressing  Goal: Maintains/Returns to pre admission functional level  Description: INTERVENTIONS:  - Perform AM-PAC 6 Click Basic Mobility/ Daily Activity assessment daily.  - Set and communicate daily mobility goal to care team and patient/family/caregiver.   - Collaborate with rehabilitation services on mobility goals if consulted  - Perform Range of Motion 4 times a day.  - Reposition patient every 2 hours.  - Dangle patient 3 times a day  - Stand patient 3 times a day  - Ambulate patient 3 times a day  - Out of bed to chair 3 times a day   - Out of bed for meals 3 times a day  - Out of bed for toileting  - Record patient progress and toleration of activity level   Outcome: Progressing

## 2025-05-28 LAB
ABO GROUP BLD: NORMAL
ALBUMIN SERPL BCG-MCNC: 3 G/DL (ref 3.5–5)
ALP SERPL-CCNC: 529 U/L (ref 34–104)
ALT SERPL W P-5'-P-CCNC: 27 U/L (ref 7–52)
AMMONIA PLAS-SCNC: 34 UMOL/L (ref 18–72)
ANION GAP SERPL CALCULATED.3IONS-SCNC: 4 MMOL/L (ref 4–13)
AST SERPL W P-5'-P-CCNC: 43 U/L (ref 13–39)
BILIRUB SERPL-MCNC: 7.26 MG/DL (ref 0.2–1)
BLD GP AB SCN SERPL QL: NEGATIVE
BUN SERPL-MCNC: 22 MG/DL (ref 5–25)
CALCIUM ALBUM COR SERPL-MCNC: 9.3 MG/DL (ref 8.3–10.1)
CALCIUM SERPL-MCNC: 8.5 MG/DL (ref 8.4–10.2)
CHLORIDE SERPL-SCNC: 109 MMOL/L (ref 96–108)
CO2 SERPL-SCNC: 21 MMOL/L (ref 21–32)
CREAT SERPL-MCNC: 2.53 MG/DL (ref 0.6–1.3)
ERYTHROCYTE [DISTWIDTH] IN BLOOD BY AUTOMATED COUNT: 13.1 % (ref 11.6–15.1)
GFR SERPL CREATININE-BSD FRML MDRD: 27 ML/MIN/1.73SQ M
GLUCOSE SERPL-MCNC: 103 MG/DL (ref 65–140)
GLUCOSE SERPL-MCNC: 139 MG/DL (ref 65–140)
GLUCOSE SERPL-MCNC: 168 MG/DL (ref 65–140)
GLUCOSE SERPL-MCNC: 172 MG/DL (ref 65–140)
GLUCOSE SERPL-MCNC: 183 MG/DL (ref 65–140)
HCT VFR BLD AUTO: 19.5 % (ref 36.5–49.3)
HGB BLD-MCNC: 6.5 G/DL (ref 12–17)
INR PPP: 1.06 (ref 0.85–1.19)
MAGNESIUM SERPL-MCNC: 2.3 MG/DL (ref 1.9–2.7)
MCH RBC QN AUTO: 31 PG (ref 26.8–34.3)
MCHC RBC AUTO-ENTMCNC: 33.3 G/DL (ref 31.4–37.4)
MCV RBC AUTO: 93 FL (ref 82–98)
PHOSPHATE SERPL-MCNC: 2.8 MG/DL (ref 2.7–4.5)
PLATELET # BLD AUTO: 243 THOUSANDS/UL (ref 149–390)
PMV BLD AUTO: 11.6 FL (ref 8.9–12.7)
POTASSIUM SERPL-SCNC: 5.1 MMOL/L (ref 3.5–5.3)
PROT SERPL-MCNC: 5.3 G/DL (ref 6.4–8.4)
PROTHROMBIN TIME: 14 SECONDS (ref 12.3–15)
RBC # BLD AUTO: 2.1 MILLION/UL (ref 3.88–5.62)
RH BLD: POSITIVE
SODIUM SERPL-SCNC: 134 MMOL/L (ref 135–147)
SPECIMEN EXPIRATION DATE: NORMAL
WBC # BLD AUTO: 9.29 THOUSAND/UL (ref 4.31–10.16)

## 2025-05-28 PROCEDURE — 86923 COMPATIBILITY TEST ELECTRIC: CPT

## 2025-05-28 PROCEDURE — 85610 PROTHROMBIN TIME: CPT | Performed by: INTERNAL MEDICINE

## 2025-05-28 PROCEDURE — 80053 COMPREHEN METABOLIC PANEL: CPT | Performed by: INTERNAL MEDICINE

## 2025-05-28 PROCEDURE — 99232 SBSQ HOSP IP/OBS MODERATE 35: CPT | Performed by: INTERNAL MEDICINE

## 2025-05-28 PROCEDURE — 84100 ASSAY OF PHOSPHORUS: CPT | Performed by: INTERNAL MEDICINE

## 2025-05-28 PROCEDURE — P9016 RBC LEUKOCYTES REDUCED: HCPCS

## 2025-05-28 PROCEDURE — 86900 BLOOD TYPING SEROLOGIC ABO: CPT | Performed by: INTERNAL MEDICINE

## 2025-05-28 PROCEDURE — 85027 COMPLETE CBC AUTOMATED: CPT | Performed by: INTERNAL MEDICINE

## 2025-05-28 PROCEDURE — 30233N1 TRANSFUSION OF NONAUTOLOGOUS RED BLOOD CELLS INTO PERIPHERAL VEIN, PERCUTANEOUS APPROACH: ICD-10-PCS | Performed by: INTERNAL MEDICINE

## 2025-05-28 PROCEDURE — 82140 ASSAY OF AMMONIA: CPT | Performed by: INTERNAL MEDICINE

## 2025-05-28 PROCEDURE — 82948 REAGENT STRIP/BLOOD GLUCOSE: CPT

## 2025-05-28 PROCEDURE — 86850 RBC ANTIBODY SCREEN: CPT | Performed by: INTERNAL MEDICINE

## 2025-05-28 PROCEDURE — 86901 BLOOD TYPING SEROLOGIC RH(D): CPT | Performed by: INTERNAL MEDICINE

## 2025-05-28 PROCEDURE — 83735 ASSAY OF MAGNESIUM: CPT | Performed by: INTERNAL MEDICINE

## 2025-05-28 PROCEDURE — 82103 ALPHA-1-ANTITRYPSIN TOTAL: CPT | Performed by: STUDENT IN AN ORGANIZED HEALTH CARE EDUCATION/TRAINING PROGRAM

## 2025-05-28 RX ADMIN — INSULIN LISPRO 1 UNITS: 100 INJECTION, SOLUTION INTRAVENOUS; SUBCUTANEOUS at 17:07

## 2025-05-28 RX ADMIN — INSULIN ASPART 5 UNITS: 100 INJECTION, SUSPENSION SUBCUTANEOUS at 08:19

## 2025-05-28 RX ADMIN — INSULIN ASPART 5 UNITS: 100 INJECTION, SUSPENSION SUBCUTANEOUS at 17:07

## 2025-05-28 RX ADMIN — HYDRALAZINE HYDROCHLORIDE 25 MG: 25 TABLET ORAL at 13:16

## 2025-05-28 RX ADMIN — HYDRALAZINE HYDROCHLORIDE 25 MG: 25 TABLET ORAL at 22:05

## 2025-05-28 RX ADMIN — AMLODIPINE BESYLATE 5 MG: 5 TABLET ORAL at 16:14

## 2025-05-28 RX ADMIN — INSULIN LISPRO 1 UNITS: 100 INJECTION, SOLUTION INTRAVENOUS; SUBCUTANEOUS at 12:34

## 2025-05-28 RX ADMIN — AMLODIPINE BESYLATE 5 MG: 5 TABLET ORAL at 07:40

## 2025-05-28 RX ADMIN — Medication 100 MG: at 07:40

## 2025-05-28 RX ADMIN — FOLIC ACID 1 MG: 1 TABLET ORAL at 07:40

## 2025-05-28 RX ADMIN — ALBUMIN (HUMAN) 25 G: 0.25 INJECTION, SOLUTION INTRAVENOUS at 09:52

## 2025-05-28 RX ADMIN — ALBUMIN (HUMAN) 25 G: 0.25 INJECTION, SOLUTION INTRAVENOUS at 22:05

## 2025-05-28 RX ADMIN — HYDRALAZINE HYDROCHLORIDE 25 MG: 25 TABLET ORAL at 05:50

## 2025-05-28 RX ADMIN — ALBUMIN (HUMAN) 25 G: 0.25 INJECTION, SOLUTION INTRAVENOUS at 04:18

## 2025-05-28 NOTE — PROGRESS NOTES
Progress Note - Hospitalist   Name: Wes Araujo 55 y.o. male I MRN: [id number]  Unit/Bed#: [place] [address] -01 I Date of Admission: 5/23/2025   Date of Service: 5/28/2025 I Hospital Day: 5    Assessment & Plan  Toxic metabolic encephalopathy  History of atrial fibrillation hypertension alcohol liver disease who presented to the hospital for change in mental status found to have hyperglycemia  Admitted to ICU and stabilized  GI consulted for liver injury/failure.  Father reports active consumption of alcohol  Mentation much improved and near baseline  Acute renal failure superimposed on stage 3 chronic kidney disease (HCC)  Acute kidney injury on CKD 3 baseline creatinine approximately 1.5-2.0  Related to progressive liver dysfunction and dehydration  Started IV albumin.  Nephrology consulted.    Results from last 7 days   Lab Units 05/28/25  0508 05/27/25  0430 05/26/25  0541 05/24/25  1639 05/24/25  0613 05/24/25  0441 05/23/25  2155 05/23/25  1855   BUN mg/dL 22 21 16 12 12 12 13 12   CREATININE mg/dL 2.53* 2.47* 2.23* 1.72* 1.78* 1.72* 1.97* 1.99*   EGFR ml/min/1.73sq m 27 28 31 43 42 43 37 36     Acute liver failure  Evidence of worsening hyperbilirubinemia and coagulopathy  Being followed by gastroenterology.  Discriminant function does not meet criteria to initiate steroids for possible alcoholic hepatitis  Ruling out infection: Cultures no growth    Results from last 7 days   Lab Units 05/28/25  0508 05/27/25  0430 05/26/25  0541 05/25/25  0450 05/24/25  0441 05/23/25  1855   AST U/L 43* 49* 51* 60* 43* 48*   ALT U/L 27 33 34 37 42 51   TOTAL BILIRUBIN mg/dL 7.26* 7.14* 3.61* 3.17* 2.93* 4.18*     Results from last 7 days   Lab Units 05/28/25  0508 05/27/25  0430 05/26/25  0541 05/25/25  0450   INR  1.06 1.11 5.53* 4.09*     Other specified anemias  No evidence of blood loss.  Patient agreeable for transfusion 1 unit PRBC    Results from last 7 days   Lab Units 05/28/25  0508 05/27/25  0430 05/26/25  0541  05/25/25  0450 05/24/25  0441 05/23/25  1855   HEMOGLOBIN g/dL 6.5* 7.2* 8.3* 9.5* 8.6* 9.7*     Primary hypertension  Continue amlodipine 5 mg twice daily, hydralazine 25 mg 3 times daily  Type 2 diabetes mellitus with hyperglycemia, with long-term current use of insulin (HCC)  Lab Results   Component Value Date    HGBA1C 9.6 (H) 05/26/2025     Recent Labs     05/27/25  1618 05/27/25  2105 05/28/25  0755 05/28/25  1106   POCGLU 240* 153* 139 168*     HHNK upon arrival likely related to noncompliance with medications and alcohol use  Restarted on 70/30 5 units twice daily  Continue sliding scale  Alcohol abuse  History of alcohol use with withdrawal and withdrawal seizures  Father reports no active consumption of alcohol  Continue thiamine and folic acid  History of atrial fibrillation  History of atrial fibrillation on metoprolol  Not on anticoagulation at baseline  Electrolyte abnormality  Hyponatremia secondary to hyperglycemia and progressive liver disease    Results from last 7 days   Lab Units 05/28/25  0508 05/27/25  0430 05/26/25  0541 05/24/25  1639   SODIUM mmol/L 134* 132* 133* 132*       VTE Pharmacologic Prophylaxis:   Moderate Risk (Score 3-4) - Pharmacological DVT Prophylaxis Contraindicated. Sequential Compression Devices Ordered.    Mobility:   Basic Mobility Inpatient Raw Score: 24  JH-HLM Goal: 8: Walk 250 feet or more  JH-HLM Achieved: 8: Walk 250 feet ot more  JH-HLM Goal achieved. Continue to encourage appropriate mobility.    Patient Centered Rounds: I have performed bedside rounds with nursing staff today.  Discussions with Specialists or Other Care Team Provider: Case management nephrology    Education and Discussions with Family / Patient:     Current Length of Stay: 5 day(s)  Current Patient Status: Inpatient   Certification Statement: The patient will continue to require additional inpatient hospital stay due to blood transfusion, kidney injury, liver injury  Discharge Plan: Anticipate  discharge in 48-72 hrs to home.    Code Status: Level 1 - Full Code    Subjective   Patient seen and examined.  No complaints.   used    Objective   Vitals:   Temp (24hrs), Av.1 °F (36.7 °C), Min:98 °F (36.7 °C), Max:98.3 °F (36.8 °C)    Temp:  [98 °F (36.7 °C)-98.3 °F (36.8 °C)] 98.3 °F (36.8 °C)  HR:  [] 95  Resp:  [16-20] 17  BP: (140-163)/(79-91) 163/85  SpO2:  [97 %-99 %] 98 %  Body mass index is 20.05 kg/m².     Input and Output Summary (last 24 hours):     Intake/Output Summary (Last 24 hours) at 2025 1436  Last data filed at 2025 1349  Gross per 24 hour   Intake 1360 ml   Output 900 ml   Net 460 ml       Physical Exam  Vitals reviewed.   Constitutional:       General: He is not in acute distress.  HENT:      Head: Atraumatic.     Cardiovascular:      Rate and Rhythm: Regular rhythm.   Pulmonary:      Effort: Pulmonary effort is normal.      Breath sounds: Decreased breath sounds present. No wheezing.   Abdominal:      General: Bowel sounds are normal.      Palpations: Abdomen is soft.      Tenderness: There is no abdominal tenderness.     Musculoskeletal:         General: No swelling.     Skin:     General: Skin is warm and dry.     Neurological:      General: No focal deficit present.      Mental Status: He is alert.      Motor: No weakness.     Psychiatric:         Mood and Affect: Mood normal.       Lines/Drains:  Invasive Devices       Peripheral Intravenous Line  Duration             Peripheral IV 25 Dorsal (posterior);Left Hand <1 day                        Lab Results: I have reviewed the following results:   Results from last 7 days   Lab Units 25  0508 25  0430 25  0541 25  0450   WBC Thousand/uL 9.29 11.67* 12.21* 19.15*   HEMOGLOBIN g/dL 6.5* 7.2* 8.3* 9.5*   PLATELETS Thousands/uL 243 261 279 306   MCV fL 93 94 91 88   TOTAL NEUT ABS Thousand/uL  --   --   --  15.70*   INR  1.06 1.11 5.53* 4.09*     Results from last 7 days   Lab Units  05/28/25  0508 05/27/25  0430 05/26/25  0541   SODIUM mmol/L 134* 132* 133*   POTASSIUM mmol/L 5.1 5.0 5.3   CHLORIDE mmol/L 109* 105 104   CO2 mmol/L 21 21 24   ANION GAP mmol/L 4 6 5   BUN mg/dL 22 21 16   CREATININE mg/dL 2.53* 2.47* 2.23*   CALCIUM mg/dL 8.5 8.1* 8.3*   ALBUMIN g/dL 3.0* 2.5* 2.4*   TOTAL BILIRUBIN mg/dL 7.26* 7.14* 3.61*   ALK PHOS U/L 529* 646* 711*   ALT U/L 27 33 34   AST U/L 43* 49* 51*   EGFR ml/min/1.73sq m 27 28 31   GLUCOSE RANDOM mg/dL 172* 216* 216*     Results from last 7 days   Lab Units 05/28/25  0508 05/26/25  0541 05/25/25  0450 05/24/25  0613 05/24/25  0441   MAGNESIUM mg/dL 2.3 2.5 2.6 1.8* 1.8*   PHOSPHORUS mg/dL 2.8 4.6* 4.7* 2.5* 9.3*     Results from last 7 days   Lab Units 05/23/25  1855   CK TOTAL U/L 62     Results from last 7 days   Lab Units 05/24/25  0030 05/23/25  2155 05/23/25  1855   HS TNI 0HR ng/L  --   --  13   HS TNI 2HR ng/L  --  14  --    HS TNI 4HR ng/L 16  --   --           Results from last 7 days   Lab Units 05/24/25  0441   PROCALCITONIN ng/ml 1.03*     Results from last 7 days   Lab Units 05/28/25  1106 05/28/25  0755 05/27/25  2105 05/27/25  1618 05/27/25  1059 05/27/25  0729 05/26/25  2118 05/26/25  1726 05/26/25  1105 05/26/25  0727 05/25/25  2104 05/25/25  1604   POC GLUCOSE mg/dl 168* 139 153* 240* 150* 208* 178* 137 203* 219* 138 159*     Results from last 7 days   Lab Units 05/26/25  1202   HEMOGLOBIN A1C % 9.6*           Recent Cultures (last 7 days):   Results from last 7 days   Lab Units 05/26/25  1742   BLOOD CULTURE  No Growth at 24 hrs.  No Growth at 24 hrs.       Imaging:  Reviewed radiology reports from this admission including:  XR chest 1 view  Result Date: 5/27/2025  Impression: Left lower lobe atelectasis versus infiltrate Workstation performed: [id number]     US right upper quadrant  Result Date: 5/23/2025  Impression: Limited exam. Heterogeneous appearance of the pancreas, correlates with fatty replacement and calcifications seen  on prior CT, possibly sequela of chronic inflammation/chronic pancreatitis. Liver appears grossly unremarkable. Small volume hypoechoic ascites, and other findings as above. Workstation performed: [id number]     CT head without contrast  Result Date: 5/23/2025  Impression: No acute intracranial abnormality. Workstation performed: [id number]       Last 24 Hours Medication List:     Current Facility-Administered Medications:     albumin human (FLEXBUMIN) 25 % injection 25 g, Q6H    amLODIPine (NORVASC) tablet 5 mg, BID    folic acid (FOLVITE) tablet 1 mg, Daily    hydrALAZINE (APRESOLINE) injection 10 mg, Q4H PRN    hydrALAZINE (APRESOLINE) tablet 25 mg, Q8H JAISON    insulin aspart protamine-insulin aspart (NovoLOG 70/30) 100 units/mL subcutaneous injection 5 Units, BID AC    insulin lispro (HumALOG/ADMELOG) 100 units/mL subcutaneous injection 1-5 Units, TID AC **AND** Fingerstick Glucose (POCT), TID AC    insulin lispro (HumALOG/ADMELOG) 100 units/mL subcutaneous injection 1-5 Units, HS    ondansetron (ZOFRAN) injection 4 mg, Q4H PRN    thiamine tablet 100 mg, Daily    Administrative Statements   Today, Patient Was Seen By: Bala Leroy, DO  I have spent a total time of 40 minutes in caring for this patient on the day of the visit/encounter including Counseling / Coordination of care, Documenting in the medical record, Reviewing/placing orders in the medical record (including tests, medications, and/or procedures), and Communicating with other healthcare professionals .    **Please Note: This note may have been constructed using a voice recognition system.**

## 2025-05-28 NOTE — ASSESSMENT & PLAN NOTE
History of atrial fibrillation hypertension alcohol liver disease who presented to the hospital for change in mental status found to have hyperglycemia  Admitted to ICU and stabilized  GI consulted for liver injury/failure.  Father reports active consumption of alcohol  Mentation much improved and near baseline

## 2025-05-28 NOTE — ASSESSMENT & PLAN NOTE
Hyponatremia secondary to hyperglycemia and progressive liver disease    Results from last 7 days   Lab Units 05/28/25  0508 05/27/25  0430 05/26/25  0541 05/24/25  1639   SODIUM mmol/L 134* 132* 133* 132*

## 2025-05-28 NOTE — ASSESSMENT & PLAN NOTE
Optimize hemodynamic status to avoid renal ischemic injury.  Maintain MAP > 65mmHg  Avoid BP fluctuations.  Home medications: Hydralazine 25 mg p.o. every 8, Norvasc 5 mg p.o. twice daily  Current medications: Norvasc 5 mg p.o. twice daily, hydralazine 25 mg p.o. every 8  Recommend completing albumin 25 g IV every 6 for 48 hours  Hold off on diuretics for now

## 2025-05-28 NOTE — ASSESSMENT & PLAN NOTE
Lab Results   Component Value Date    HGBA1C 9.6 (H) 05/26/2025       Recent Labs     05/27/25  1618 05/27/25  2105 05/28/25  0755 05/28/25  1106   POCGLU 240* 153* 139 168*       Blood Sugar Average: Last 72 hrs:  (P) [id number]  On insulin  Recommend tight glycemic control

## 2025-05-28 NOTE — ASSESSMENT & PLAN NOTE
most recent hemoglobin at 6.5 g/dL  Maintain hemoglobin greater than 8 grams/deciliter  Follow-up with primary team  Consider PRBC transfusion

## 2025-05-28 NOTE — ASSESSMENT & PLAN NOTE
Lab Results   Component Value Date    EGFR 27 05/28/2025    EGFR 28 05/27/2025    EGFR 31 05/26/2025    CREATININE 2.53 (H) 05/28/2025    CREATININE 2.47 (H) 05/27/2025    CREATININE 2.23 (H) 05/26/2025   After review of records it appears that the patient has a baseline Creatinine of 1.5-2.0 mg/dL.  Avoid nephrotoxins, adjust meds to appropriate GFR.  Likely has underlying CKD secondary to poorly controlled diabetes plus hypertensive nephrosclerosis plus age-related nephron loss  Outpatient for nephrology follows up with nurse practitioner Tanika last seen 2019

## 2025-05-28 NOTE — PROGRESS NOTES
Follow up Consultation    Nephrology   Wes Araujo 55 y.o. male MRN: [id number]  Unit/Bed#: [place] [address] -01 Encounter: [id number]      Physician Requesting Consult: Bala Leroy DO  55 y.o.  male with pmh of multiple comorbidities including alcohol use disorder, chronic pancreatitis, diabetes, A-fib, hypertension and noncompliance initially presented with changes in mental status and agitation.  During the course of the hospital stay was treated for HHNK and alcohol withdrawal.  Nephrology has been consulted on 5/27/2025 for evaluation and management of abnormal renal parameters.    Assessment & Plan  Acute kidney injury (HCC)  ZENAIDA most likely secondary to prerenal azotemia secondary to osmotic diuresis plus some component of intravascular volume depletion in light of hypoalbuminemia plus cholemic nephrosis with subsequent ATN versus HRS in the presence of acute liver injury  After review of records it appears that the patient has a baseline Creatinine of 1.5-2.0 mg/dL.  Admission creatinine of 1.99 mg/dL on 5/23.  Creatinine today is at 2.53 mg/dL unfortunately continue deteriorate however appears to be plateauing.  check BMP, magnesium, phosphorus in a.m.  Continue albumin 25 g IV Q6 for 48 hours  Discussed with patient we will have to wait a period of 3 months to see where new baseline creatinine would be  Hold off on diuretics  Await renal recovery.  Place on a renal diet when allowed diet order.   CT abdomen with contrast/26/25 no hydronephrosis unremarkable kidneys  Strict I/O.  Daily weights  Urinary retention protocol  Avoid nephrotoxins, adjust meds to appropriate GFR.  Likely has underlying CKD secondary to poorly controlled diabetes plus hypertensive nephrosclerosis plus age-related nephron loss  Outpatient for nephrology follows up with nurse practitioner Tanika last seen 2019  CKD stage 3b, GFR 30-44 ml/min ([facility])  Lab Results   Component Value Date    EGFR 27 05/28/2025    EGFR 28 05/27/2025     EGFR 31 05/26/2025    CREATININE 2.53 (H) 05/28/2025    CREATININE 2.47 (H) 05/27/2025    CREATININE 2.23 (H) 05/26/2025   After review of records it appears that the patient has a baseline Creatinine of 1.5-2.0 mg/dL.  Avoid nephrotoxins, adjust meds to appropriate GFR.  Likely has underlying CKD secondary to poorly controlled diabetes plus hypertensive nephrosclerosis plus age-related nephron loss  Outpatient for nephrology follows up with nurse practitioner Tanika last seen 2019    Hyponatremia  Most recent serum sodium 134 mill equivalents corrected for hyperglycemia within normal limits  Check BMP in a.m.  Primary hypertension  Optimize hemodynamic status to avoid renal ischemic injury.  Maintain MAP > 65mmHg  Avoid BP fluctuations.  Home medications: Hydralazine 25 mg p.o. every 8, Norvasc 5 mg p.o. twice daily  Current medications: Norvasc 5 mg p.o. twice daily, hydralazine 25 mg p.o. every 8  Recommend completing albumin 25 g IV every 6 for 48 hours  Hold off on diuretics for now  Type 2 diabetes mellitus with hyperglycemia, with long-term current use of insulin ([facility])  Lab Results   Component Value Date    HGBA1C 9.6 (H) 05/26/2025       Recent Labs     05/27/25  1618 05/27/25  2105 05/28/25  0755 05/28/25  1106   POCGLU 240* 153* 139 168*       Blood Sugar Average: Last 72 hrs:  (P) 166.4019098185212210  On insulin  Recommend tight glycemic control  Alcohol abuse  Monitor for withdrawal  History of atrial fibrillation  Follow-up with cardiology  Acute liver failure  Follow-up with GI  Rising LFTs and bilirubin levels  Other specified anemias  most recent hemoglobin at 6.5 g/dL  Maintain hemoglobin greater than 8 grams/deciliter  Follow-up with primary team  Consider PRBC transfusion  Elevated LFTs  Follow-up with GI        Thanks for the consult  Will continue to follow  Please call with questions/ concerns.  Above-mentioned orders and Plan in terms of complete albumin avoid diuretics follow-up with GI were  "discussed with the team in depth and they agree.  Please secure chat the Nephrologist on call for this campus if you have any Nephrological questions or concerns.    Bibi Solis MD, [facility], 12:14 PM              Objective :   Seen and examined in his room no overnight events blood pressures stable afebrile just upset with the meals that he is getting in the hospital, urine output 700 cc plus last 24 hours      PHYSICAL EXAM  /85   Pulse 95   Temp 98.3 °F (36.8 °C)   Resp 17   Ht 5' 9\" (1.753 m)   Wt 61.6 kg (135 lb 12.9 oz)   SpO2 98%   BMI 20.05 kg/m²   Temp (24hrs), Av.1 °F (36.7 °C), Min:98 °F (36.7 °C), Max:98.3 °F (36.8 °C)        Intake/Output Summary (Last 24 hours) at 2025 1214  Last data filed at 2025 0754  Gross per 24 hour   Intake 1080 ml   Output 700 ml   Net 380 ml       I/O last 24 hours:  In: 1560 [P.O.:1560]  Out: 700 [Urine:700]      Current Weight: Weight - Scale: 61.6 kg (135 lb 12.9 oz)  First Weight: Weight - Scale: 55.4 kg (122 lb 2.2 oz)  Physical Exam  Vitals and nursing note reviewed.   Constitutional:       General: He is not in acute distress.     Appearance: Normal appearance. He is not toxic-appearing or diaphoretic.   HENT:      Head: Normocephalic and atraumatic.      Mouth/Throat:      Pharynx: No oropharyngeal exudate.     Eyes:      General: No scleral icterus.      Cardiovascular:      Rate and Rhythm: Normal rate.   Pulmonary:      Effort: No respiratory distress.      Breath sounds: No wheezing.   Abdominal:      General: There is no distension.      Tenderness: There is no abdominal tenderness.     Musculoskeletal:         General: No swelling.     Skin:     Coloration: Skin is not jaundiced.     Neurological:      General: No focal deficit present.      Mental Status: He is alert and oriented to person, place, and time.     Psychiatric:         Mood and Affect: Mood normal.             Review of Systems   Constitutional:  Negative for " appetite change, chills and fatigue.   HENT:  Negative for congestion.    Respiratory:  Negative for cough and shortness of breath.    Cardiovascular:  Negative for leg swelling.   Gastrointestinal:  Positive for abdominal pain.   Musculoskeletal:  Negative for back pain.   Neurological:  Negative for headaches.   Psychiatric/Behavioral:  Positive for agitation.    All other systems reviewed and are negative.      Scheduled Meds:  Current Facility-Administered Medications   Medication Dose Route Frequency Provider Last Rate    Albumin 25%  25 g Intravenous Q6H Bibi Solis MD      amLODIPine  5 mg Oral BID Bibi Solis MD      folic acid  1 mg Oral Daily Bala Leroy DO      hydrALAZINE  10 mg Intravenous Q4H PRN Veronica Phyllis Petersen, CRNP      hydrALAZINE  25 mg Oral Q8H JAISON Veronica Phyllis Petersen, CRNP      insulin aspart protamine-insulin aspart  5 Units Subcutaneous BID AC Veronica Phyllis Petersen, CRNP      insulin lispro  1-5 Units Subcutaneous TID AC Veronica Phyllis Petersen, CRNP      insulin lispro  1-5 Units Subcutaneous HS Veronica Phyllis Petersen, THAONP      ondansetron  4 mg Intravenous Q4H PRN Bala Leroy DO      thiamine  100 mg Oral Daily Bala Leroy DO         PRN Meds:.  hydrALAZINE    ondansetron    Continuous Infusions:       Invasive Devices:     Invasive Devices       Peripheral Intravenous Line  Duration             Peripheral IV 05/28/25 Dorsal (posterior);Left Hand <1 day                      LABORATORY:    Results from last 7 days   Lab Units 05/28/25  0508 05/27/25  0430 05/26/25  0541 05/25/25  0450 05/24/25  1639 05/24/25  0613 05/24/25  0441 05/23/25  2155 05/23/25  1855   WBC Thousand/uL 9.29 11.67* 12.21* 19.15*  --   --  20.48*  --  11.35*   HEMOGLOBIN g/dL 6.5* 7.2* 8.3* 9.5*  --   --  8.6*  --  9.7*   HEMATOCRIT % 19.5* 21.8* 25.0* 27.4*  --   --  25.5*  --  28.9*   PLATELETS Thousands/uL 243 261 279 306  --   --  278  --  220   POTASSIUM mmol/L 5.1 5.0 5.3  --  4.9 3.8 6.6* 3.4* 3.8   CHLORIDE mmol/L 109*  "105 104  --  101 104 102 97 92*   CO2 mmol/L 21 21 24  --  22 22 20* 23 23   BUN mg/dL 22 21 16  --  12 12 12 13 12   CREATININE mg/dL 2.53* 2.47* 2.23*  --  1.72* 1.78* 1.72* 1.97* 1.99*   CALCIUM mg/dL 8.5 8.1* 8.3*  --  8.4 8.6 8.5 8.2* 8.6   MAGNESIUM mg/dL 2.3  --  2.5 2.6  --  1.8* 1.8* 1.9  --    PHOSPHORUS mg/dL 2.8  --  4.6* 4.7*  --  2.5* 9.3* 1.9*  --       rest all reviewed    RADIOLOGY:  XR chest 1 view   Final Result by Miguel Angel Regalado MD (05/27 1617)      Left lower lobe atelectasis versus infiltrate            Workstation performed: [id number]         US right upper quadrant   Final Result by Solomon Spears DO (05/23 2314)      Limited exam.      Heterogeneous appearance of the pancreas, correlates with fatty replacement and calcifications seen on prior CT, possibly sequela of chronic inflammation/chronic pancreatitis.      Liver appears grossly unremarkable.      Small volume hypoechoic ascites, and other findings as above.      Workstation performed: [id number]         CT head without contrast   Final Result by Eleuterio Campa MD (05/23 2103)      No acute intracranial abnormality.                  Workstation performed: [id number]           Rest all reviewed    Portions of the record may have been created with voice recognition software. Occasional wrong word or \"sound a like\" substitutions may have occurred due to the inherent limitations of voice recognition software. Read the chart carefully and recognize, using context, where substitutions have occurred.If you have any questions, please contact the dictating provider.  "

## 2025-05-28 NOTE — PLAN OF CARE
Problem: PAIN - ADULT  Goal: Verbalizes/displays adequate comfort level or baseline comfort level  Description: Interventions:  - Encourage patient to monitor pain and request assistance  - Assess pain using appropriate pain scale  - Administer analgesics as ordered based on type and severity of pain and evaluate response  - Implement non-pharmacological measures as appropriate and evaluate response  - Consider cultural and social influences on pain and pain management  - Notify physician/advanced practitioner if interventions unsuccessful or patient reports new pain  - Educate patient/family on pain management process including their role and importance of  reporting pain   - Provide non-pharmacologic/complimentary pain relief interventions  Outcome: Progressing     Problem: INFECTION - ADULT  Goal: Absence or prevention of progression during hospitalization  Description: INTERVENTIONS:  - Assess and monitor for signs and symptoms of infection  - Monitor lab/diagnostic results  - Monitor all insertion sites, i.e. indwelling lines, tubes, and drains  - Monitor endotracheal if appropriate and nasal secretions for changes in amount and color  - [place] appropriate cooling/warming therapies per order  - Administer medications as ordered  - Instruct and encourage patient and family to use good hand hygiene technique  - Identify and instruct in appropriate isolation precautions for identified infection/condition  Outcome: Progressing  Goal: Absence of fever/infection during neutropenic period  Description: INTERVENTIONS:  - Monitor WBC  - Perform strict hand hygiene  - Limit to healthy visitors only  - No plants, dried, fresh or silk flowers with m[place]cl[place] in patient room  Outcome: Progressing     Problem: SAFETY ADULT  Goal: Patient will remain free of falls  Description: INTERVENTIONS:  - Educate patient/family on patient safety including physical limitations  - Instruct patient to call for assistance with activity   -  Consider consulting OT/PT to assist with strengthening/mobility based on AM PAC & JH-HLM score  - Consult OT/PT to assist with strengthening/mobility   - Keep Call bell within reach  - Keep bed low and locked with side rails adjusted as appropriate  - Keep care items and personal belongings within reach  - Initiate and maintain comfort rounds  - Make Fall Risk Sign visible to staff  - Offer Toileting every 2 Hours, in advance of need  - Initiate/Maintain bed alarm  - Obtain necessary fall risk management equipment: alarms  - Apply yellow socks and bracelet for high fall risk patients  - Consider moving patient to room near nurses station  Outcome: Progressing  Goal: Maintain or return to baseline ADL function  Description: INTERVENTIONS:  -  Assess patient's ability to carry out ADLs; assess patient's baseline for ADL function and identify physical deficits which impact ability to perform ADLs (bathing, care of mouth/teeth, toileting, grooming, dressing, etc.)  - Assess/evaluate cause of self-care deficits   - Assess range of motion  - Assess patient's mobility; develop plan if impaired  - Assess patient's need for assistive devices and provide as appropriate  - Encourage maximum independence but intervene and supervise when necessary  - Involve family in performance of ADLs  - Assess for home care needs following discharge   - Consider OT consult to assist with ADL evaluation and planning for discharge  - Provide patient education as appropriate  - Monitor functional capacity and physical performance, use of AM PAC & JH-HLM   - Monitor gait, balance and fatigue with ambulation    Outcome: Progressing  Goal: Maintains/Returns to pre admission functional level  Description: INTERVENTIONS:  - Perform AM-PAC 6 Click Basic Mobility/ Daily Activity assessment daily.  - Set and communicate daily mobility goal to care team and patient/family/caregiver.   - Collaborate with rehabilitation services on mobility goals if  consulted  - Perform Range of Motion 4 times a day.  - Reposition patient every 2 hours.  - Dangle patient 3 times a day  - Stand patient 3 times a day  - Ambulate patient 3 times a day  - Out of bed to chair 3 times a day   - Out of bed for meals 3 times a day  - Out of bed for toileting  - Record patient progress and toleration of activity level   Outcome: Progressing     Problem: DISCHARGE PLANNING  Goal: Discharge to home or other facility with appropriate resources  Description: INTERVENTIONS:  - Identify barriers to discharge w/patient and caregiver  - Arrange for needed discharge resources and transportation as appropriate  - Identify discharge learning needs (meds, wound care, etc.)  - Arrange for interpretive services to assist at discharge as needed  - Refer to Case Management Department for coordinating discharge planning if the patient needs post-hospital services based on physician/advanced practitioner order or complex needs related to functional status, cognitive ability, or social support system  Outcome: Progressing     Problem: Knowledge Deficit  Goal: Patient/family/caregiver demonstrates understanding of disease process, treatment plan, medications, and discharge instructions  Description: Complete learning assessment and assess knowledge base.  Interventions:  - Provide teaching at level of understanding  - Provide teaching via preferred learning methods  Outcome: Progressing     Problem: NEUROSENSORY - ADULT  Goal: Achieves stable or improved neurological status  Description: INTERVENTIONS  - Monitor and report changes in neurological status  - Monitor vital signs such as temperature, blood pressure, glucose, and any other labs ordered   - Initiate measures to prevent increased intracranial pressure  - Monitor for seizure activity and implement precautions if appropriate      Outcome: Progressing  Goal: Remains free of injury related to seizures activity  Description: INTERVENTIONS  - Maintain  airway, patient safety  and administer oxygen as ordered  - Monitor patient for seizure activity, document and report duration and description of seizure to physician/advanced practitioner  - If seizure occurs,  ensure patient safety during seizure  - Reorient patient post seizure  - Seizure pads on all 4 side rails  - Instruct patient/family to notify RN of any seizure activity including if an aura is experienced  - Instruct patient/family to call for assistance with activity based on nursing assessment  - Administer anti-seizure medications if ordered    Outcome: Progressing  Goal: Achieves maximal functionality and self care  Description: INTERVENTIONS  - Monitor swallowing and airway patency with patient fatigue and changes in neurological status  - Encourage and assist patient to increase activity and self care.   - Encourage visually impaired, hearing impaired and aphasic patients to use assistive/communication devices  Outcome: Progressing     Problem: CARDIOVASCULAR - ADULT  Goal: Maintains optimal cardiac output and hemodynamic stability  Description: INTERVENTIONS:  - Monitor I/O, vital signs and rhythm  - Monitor for S/S and trends of decreased cardiac output  - Administer and titrate ordered vasoactive medications to optimize hemodynamic stability  - Assess quality of pulses, skin color and temperature  - Assess for signs of decreased coronary artery perfusion  - Instruct patient to report change in severity of symptoms  Outcome: Progressing  Goal: Absence of cardiac dysrhythmias or at baseline rhythm  Description: INTERVENTIONS:  - Continuous cardiac monitoring, vital signs, obtain 12 lead EKG if ordered  - Administer antiarrhythmic and heart rate control medications as ordered  - Monitor electrolytes and administer replacement therapy as ordered  Outcome: Progressing     Problem: RESPIRATORY - ADULT  Goal: Achieves optimal ventilation and oxygenation  Description: INTERVENTIONS:  - Assess for changes  in respiratory status  - Assess for changes in mentation and behavior  - Position to facilitate oxygenation and minimize respiratory effort  - Oxygen administered by appropriate delivery if ordered  - Initiate smoking cessation education as indicated  - Encourage broncho-pulmonary hygiene including cough, deep breathe, Incentive Spirometry  - Assess the need for suctioning and aspirate as needed  - Assess and instruct to report SOB or any respiratory difficulty  - Respiratory Therapy support as indicated  Outcome: Progressing     Problem: GASTROINTESTINAL - ADULT  Goal: Minimal or absence of nausea and/or vomiting  Description: INTERVENTIONS:  - Administer IV fluids if ordered to ensure adequate hydration  - Maintain NPO status until nausea and vomiting are resolved  - Nasogastric tube if ordered  - Administer ordered antiemetic medications as needed  - Provide nonpharmacologic comfort measures as appropriate  - Advance diet as tolerated, if ordered  - Consider nutrition services referral to assist patient with adequate nutrition and appropriate food choices  Outcome: Progressing  Goal: Maintains or returns to baseline bowel function  Description: INTERVENTIONS:  - Assess bowel function  - Encourage oral fluids to ensure adequate hydration  - Administer IV fluids if ordered to ensure adequate hydration  - Administer ordered medications as needed  - Encourage mobilization and activity  - Consider nutritional services referral to assist patient with adequate nutrition and appropriate food choices  Outcome: Progressing  Goal: Maintains adequate nutritional intake  Description: INTERVENTIONS:  - Monitor percentage of each meal consumed  - Identify factors contributing to decreased intake, treat as appropriate  - Assist with meals as needed  - Monitor I&O, weight, and lab values if indicated  - Obtain nutrition services referral as needed  Outcome: Progressing  Goal: Establish and maintain optimal ostomy  function  Description: INTERVENTIONS:  - Assess bowel function  - Encourage oral fluids to ensure adequate hydration  - Administer IV fluids if ordered to ensure adequate hydration   - Administer ordered medications as needed  - Encourage mobilization and activity  - Nutrition services referral to assist patient with appropriate food choices  - Assess stoma site  - Consider wound care consult   Outcome: Progressing  Goal: Oral mucous membranes remain intact  Description: INTERVENTIONS  - Assess oral mucosa and hygiene practices  - Implement preventative oral hygiene regimen  - Implement oral medicated treatments as ordered  - Initiate Nutrition services referral as needed  Outcome: Progressing     Problem: GENITOURINARY - ADULT  Goal: Maintains or returns to baseline urinary function  Description: INTERVENTIONS:  - Assess urinary function  - Encourage oral fluids to ensure adequate hydration if ordered  - Administer IV fluids as ordered to ensure adequate hydration  - Administer ordered medications as needed  - Offer frequent toileting  - Follow urinary retention protocol if ordered  Outcome: Progressing  Goal: Absence of urinary retention  Description: INTERVENTIONS:  - Assess patient’s ability to void and empty bladder  - Monitor I/O  - Bladder scan as needed  - Discuss with physician/AP medications to alleviate retention as needed  - Discuss catheterization for long term situations as appropriate  Outcome: Progressing  Goal: Urinary catheter remains patent  Description: INTERVENTIONS:  - Assess patency of urinary catheter  - If patient has a chronic ibrahim, consider changing catheter if non-functioning  - Follow guidelines for intermittent irrigation of non-functioning urinary catheter  Outcome: Progressing     Problem: METABOLIC, FLUID AND ELECTROLYTES - ADULT  Goal: Electrolytes maintained within normal limits  Description: INTERVENTIONS:  - Monitor labs and assess patient for signs and symptoms of electrolyte  imbalances  - Administer electrolyte replacement as ordered  - Monitor response to electrolyte replacements, including repeat lab results as appropriate  - Instruct patient on fluid and nutrition as appropriate  Outcome: Progressing  Goal: Fluid balance maintained  Description: INTERVENTIONS:  - Monitor labs   - Monitor I/O and WT  - Instruct patient on fluid and nutrition as appropriate  - Assess for signs & symptoms of volume excess or deficit  Outcome: Progressing  Goal: Glucose maintained within target range  Description: INTERVENTIONS:  - Monitor Blood Glucose as ordered  - Assess for signs and symptoms of hyperglycemia and hypoglycemia  - Administer ordered medications to maintain glucose within target range  - Assess nutritional intake and initiate nutrition service referral as needed  Outcome: Progressing     Problem: SKIN/TISSUE INTEGRITY - ADULT  Goal: Skin Integrity remains intact(Skin Breakdown Prevention)  Description: Assess:  -Perform Jose assessment every shift  -Clean and moisturize skin every shift  -Inspect skin when repositioning, toileting, and assisting with ADLS  -Assess under medical devices such as IV every shift  -Assess extremities for adequate circulation and sensation     Bed Management:  -Have minimal linens on bed & keep smooth, unwrinkled  -Change linens as needed when moist or perspiring  -Avoid sitting or lying in one position for more than 2 hours while in bed  -Keep HOB at 30 degrees     Toileting:  -Offer bedside commode  -Assess for incontinence every shift  -Use incontinent care products after each incontinent episode such as foam cleanser    Activity:  -Mobilize patient 3 times a day  -Encourage activity and walks on unit  -Encourage or provide ROM exercises   -Turn and reposition patient every 2 Hours  -Use appropriate equipment to lift or move patient in bed  -Instruct/ Assist with weight shifting every hour when out of bed in chair  -Consider limitation of chair time 1  hour intervals    Skin Care:  -Avoid use of baby powder, tape, friction and shearing, hot water or constrictive clothing  -Relieve pressure over bony prominences using pillows  -Do not massage red bony areas    Next Steps:  -Teach patient strategies to minimize risks such as skin breakdown   -Consider consults to  interdisciplinary teams such as wound care  Outcome: Progressing  Goal: Incision(s), wounds(s) or drain site(s) healing without S/S of infection  Description: INTERVENTIONS  - Assess and document dressing, incision, wound bed, drain sites and surrounding tissue  - Provide patient and family education  - Perform skin care/dressing changes every shift  Outcome: Progressing  Goal: Pressure injury heals and does not worsen  Description: Interventions:  - Implement low air loss mattress or specialty surface (Criteria met)  - Apply silicone foam dressing  - Instruct/assist with weight shifting every 30  minutes when in chair   - Limit chair time to 1 hour intervals  - Use special pressure reducing interventions such as cushion when in chair   - Apply fecal or urinary incontinence containment device   - Perform passive or active ROM every shift  - Turn and reposition patient & offload bony prominences every 2 hours   - Utilize friction reducing device or surface for transfers   - Consider consults to  interdisciplinary teams such as wound care  - Use incontinent care products after each incontinent episode such as foam cleanser  - Consider nutrition services referral as needed  Outcome: Progressing     Problem: HEMATOLOGIC - ADULT  Goal: Maintains hematologic stability  Description: INTERVENTIONS  - Assess for signs and symptoms of bleeding or hemorrhage  - Monitor labs  - Administer supportive blood products/factors as ordered and appropriate  Outcome: Progressing     Problem: MUSCULOSKELETAL - ADULT  Goal: Maintain or return mobility to safest level of function  Description: INTERVENTIONS:  - Assess patient's  ability to carry out ADLs; assess patient's baseline for ADL function and identify physical deficits which impact ability to perform ADLs (bathing, care of mouth/teeth, toileting, grooming, dressing, etc.)  - Assess/evaluate cause of self-care deficits   - Assess range of motion  - Assess patient's mobility  - Assess patient's need for assistive devices and provide as appropriate  - Encourage maximum independence but intervene and supervise when necessary  - Involve family in performance of ADLs  - Assess for home care needs following discharge   - Consider OT consult to assist with ADL evaluation and planning for discharge  - Provide patient education as appropriate  Outcome: Progressing  Goal: Maintain proper alignment of affected body part  Description: INTERVENTIONS:  - Support, maintain and protect limb and body alignment  - Provide patient/ family with appropriate education  Outcome: Progressing     Problem: SAFETY,RESTRAINT: NV/NON-SELF DESTRUCTIVE BEHAVIOR  Goal: Remains free of harm/injury (restraint for non violent/non self-detsructive behavior)  Description: INTERVENTIONS:  - Instruct patient/family regarding restraint use   - Assess and monitor physiologic and psychological status   - Provide interventions and comfort measures to meet assessed patient needs   - Identify and implement measures to help patient regain control  - Assess readiness for release of restraint   Outcome: Progressing  Goal: Returns to optimal restraint-free functioning  Description: INTERVENTIONS:  - Assess the patient's behavior and symptoms that indicate continued need for restraint  - Identify and implement measures to help patient regain control  - Assess readiness for release of restraint   Outcome: Progressing     Problem: Prexisting or High Potential for Compromised Skin Integrity  Goal: Skin integrity is maintained or improved  Description: INTERVENTIONS:  - Identify patients at risk for skin breakdown  - Assess and monitor  skin integrity including under and around medical devices   - Assess and monitor nutrition and hydration status  - Monitor labs  - Assess for incontinence   - Turn and reposition patient  - Assist with mobility/ambulation  - Relieve pressure over keith prominences   - Avoid friction and shearing  - Provide appropriate hygiene as needed including keeping skin clean and dry  - Evaluate need for skin moisturizer/barrier cream  - Collaborate with interdisciplinary team  - Patient/family teaching  - Consider wound care consult    Assess:  - Review Jose scale daily  - Clean and moisturize skin every shift  - Inspect skin when repositioning, toileting, and assisting with ADLS  - Assess under medical devices such as IV every shift  - Assess extremities for adequate circulation and sensation     Bed Management:  - Have minimal linens on bed & keep smooth, unwrinkled  - Change linens as needed when moist or perspiring  - Avoid sitting or lying in one position for more than 2 hours while in bed?Keep HOB at 30 degrees   - Toileting:  - Offer bedside commode  - Assess for incontinence every shift  - Use incontinent care products after each incontinent episode such as foam cleanser    Activity:  - Mobilize patient 3 times a day  - Encourage activity and walks on unit  - Encourage or provide ROM exercises   - Turn and reposition patient every 2 Hours  - Use appropriate equipment to lift or move patient in bed  - Instruct/ Assist with weight shifting every hour when out of bed in chair  - Consider limitation of chair time 12 hour intervals    Skin Care:  - Avoid use of baby powder, tape, friction and shearing, hot water or constrictive clothing  - Relieve pressure over bony prominences using pillows  - Do not massage red bony areas    Next Steps:  - Teach patient strategies to minimize risks such as skin breakdown  - Consider consults to  interdisciplinary teams such as wound care  Outcome: Progressing     Problem:  Nutrition/Hydration-ADULT  Goal: Nutrient/Hydration intake appropriate for improving, restoring or maintaining nutritional needs  Description: Monitor and assess patient's nutrition/hydration status for malnutrition. Collaborate with interdisciplinary team and initiate plan and interventions as ordered.  Monitor patient's weight and dietary intake as ordered or per policy. Utilize nutrition screening tool and intervene as necessary. Determine patient's food preferences and provide high-protein, high-caloric foods as appropriate.     INTERVENTIONS:  - Monitor oral intake, urinary output, labs, and treatment plans  - Assess nutrition and hydration status and recommend course of action  - Evaluate amount of meals eaten  - Assist patient with eating if necessary   - Allow adequate time for meals  - Recommend/ encourage appropriate diets, oral nutritional supplements, and vitamin/mineral supplements  - Order, calculate, and assess calorie counts as needed  - Recommend, monitor, and adjust tube feedings and TPN/PPN based on assessed needs  - Assess need for intravenous fluids  - Provide specific nutrition/hydration education as appropriate  - Include patient/family/caregiver in decisions related to nutrition  Outcome: Progressing     Problem: MOBILITY - ADULT  Goal: Maintain or return to baseline ADL function  Description: INTERVENTIONS:  -  Assess patient's ability to carry out ADLs; assess patient's baseline for ADL function and identify physical deficits which impact ability to perform ADLs (bathing, care of mouth/teeth, toileting, grooming, dressing, etc.)  - Assess/evaluate cause of self-care deficits   - Assess range of motion  - Assess patient's mobility; develop plan if impaired  - Assess patient's need for assistive devices and provide as appropriate  - Encourage maximum independence but intervene and supervise when necessary  - Involve family in performance of ADLs  - Assess for home care needs following  discharge   - Consider OT consult to assist with ADL evaluation and planning for discharge  - Provide patient education as appropriate  - Monitor functional capacity and physical performance, use of AM PAC & -HLM   - Monitor gait, balance and fatigue with ambulation    Outcome: Progressing  Goal: Maintains/Returns to pre admission functional level  Description: INTERVENTIONS:  - Perform AM-PAC 6 Click Basic Mobility/ Daily Activity assessment daily.  - Set and communicate daily mobility goal to care team and patient/family/caregiver.   - Collaborate with rehabilitation services on mobility goals if consulted  - Perform Range of Motion 4 times a day.  - Reposition patient every 2 hours.  - Dangle patient 3 times a day  - Stand patient 3 times a day  - Ambulate patient 3 times a day  - Out of bed to chair 3 times a day   - Out of bed for meals 3 times a day  - Out of bed for toileting  - Record patient progress and toleration of activity level   Outcome: Progressing

## 2025-05-28 NOTE — CASE MANAGEMENT
Case Management Discharge Planning Note    Patient name Wes Araujo  Location [facility] /[facility] 2 M* MRN [id number]  : 1970 Date 2025       Current Admission Date: 2025  Current Admission Diagnosis:Elevated LFTs   Patient Active Problem List    Diagnosis Date Noted    Acute kidney injury (HCC) 2025    CKD stage 3b, GFR 30-44 ml/min (HCC) 2025    Other specified anemias 2025    Elevated LFTs 2025    Cameroonian  needed 2025    Electrolyte abnormality 2025    Acute liver failure 2025    Abdominal pain 2025    CKD stage 3a, GFR 45-59 ml/min (HCC) 2025    Alcohol-induced chronic pancreatitis (HCC) 2024    Toxic metabolic encephalopathy 2024    Seizure (HCC) 2022    Bilateral hearing loss 2020    Acute renal failure superimposed on stage 3 chronic kidney disease (HCC) 2019    Anemia 2019    Hyponatremia 2019    History of atrial fibrillation 2019    ZENAIDA (acute kidney injury) (HCC) 2019    Primary hypertension     Type 2 diabetes mellitus with hyperglycemia, with long-term current use of insulin (HCC)     Alcohol abuse     Paroxysmal A-fib (HCC)       LOS (days): 5  Geometric Mean LOS (GMLOS) (days):   Days to GMLOS:     OBJECTIVE:  Risk of Unplanned Readmission Score: 27.81         Current admission status: Inpatient   Preferred Pharmacy:    [facility] [place] [address] [place] [id number]  Phone: [phone] Fax: [phone]    [facility] [address]  [facility] [address] [facility],  [address],  [address] [place] [address]  Phone: [phone] Fax: [phone]    [id number] [address]  [address] [facility] [address]  Phone: [phone] Fax: [phone]    Primary Care Provider: Rush Kebede MD    Primary Insurance: [facility]  B[employer]  Secondary Insurance:     DISCHARGE DETAILS:                                          Other Referral/Resources/Interventions Provided:  Interventions: Outpatient  (Referred for OPCM as pt is moderate risk for readmission and has documented noncompliance with insulin.  Would benefit from closer community follow up.)

## 2025-05-28 NOTE — PLAN OF CARE
Problem: PAIN - ADULT  Goal: Verbalizes/displays adequate comfort level or baseline comfort level  Description: Interventions:  - Encourage patient to monitor pain and request assistance  - Assess pain using appropriate pain scale  - Administer analgesics as ordered based on type and severity of pain and evaluate response  - Implement non-pharmacological measures as appropriate and evaluate response  - Consider cultural and social influences on pain and pain management  - Notify physician/advanced practitioner if interventions unsuccessful or patient reports new pain  - Educate patient/family on pain management process including their role and importance of  reporting pain   - Provide non-pharmacologic/complimentary pain relief interventions  Outcome: Progressing     Problem: INFECTION - ADULT  Goal: Absence or prevention of progression during hospitalization  Description: INTERVENTIONS:  - Assess and monitor for signs and symptoms of infection  - Monitor lab/diagnostic results  - Monitor all insertion sites, i.e. indwelling lines, tubes, and drains  - Monitor endotracheal if appropriate and nasal secretions for changes in amount and color  - Lowber appropriate cooling/warming therapies per order  - Administer medications as ordered  - Instruct and encourage patient and family to use good hand hygiene technique  - Identify and instruct in appropriate isolation precautions for identified infection/condition  Outcome: Progressing  Goal: Absence of fever/infection during neutropenic period  Description: INTERVENTIONS:  - Monitor WBC  - Perform strict hand hygiene  - Limit to healthy visitors only  - No plants, dried, fresh or silk flowers with mcclure in patient room  Outcome: Progressing     Problem: SAFETY ADULT  Goal: Patient will remain free of falls  Description: INTERVENTIONS:  - Educate patient/family on patient safety including physical limitations  - Instruct patient to call for assistance with activity   -  Consider consulting OT/PT to assist with strengthening/mobility based on AM PAC & JH-HLM score  - Consult OT/PT to assist with strengthening/mobility   - Keep Call bell within reach  - Keep bed low and locked with side rails adjusted as appropriate  - Keep care items and personal belongings within reach  - Initiate and maintain comfort rounds  - Make Fall Risk Sign visible to staff  - Offer Toileting every 2 Hours, in advance of need  - Initiate/Maintain bed alarm  - Obtain necessary fall risk management equipment: alarms  - Apply yellow socks and bracelet for high fall risk patients  - Consider moving patient to room near nurses station  Outcome: Progressing  Goal: Maintain or return to baseline ADL function  Description: INTERVENTIONS:  -  Assess patient's ability to carry out ADLs; assess patient's baseline for ADL function and identify physical deficits which impact ability to perform ADLs (bathing, care of mouth/teeth, toileting, grooming, dressing, etc.)  - Assess/evaluate cause of self-care deficits   - Assess range of motion  - Assess patient's mobility; develop plan if impaired  - Assess patient's need for assistive devices and provide as appropriate  - Encourage maximum independence but intervene and supervise when necessary  - Involve family in performance of ADLs  - Assess for home care needs following discharge   - Consider OT consult to assist with ADL evaluation and planning for discharge  - Provide patient education as appropriate  - Monitor functional capacity and physical performance, use of AM PAC & JH-HLM   - Monitor gait, balance and fatigue with ambulation    Outcome: Progressing  Goal: Maintains/Returns to pre admission functional level  Description: INTERVENTIONS:  - Perform AM-PAC 6 Click Basic Mobility/ Daily Activity assessment daily.  - Set and communicate daily mobility goal to care team and patient/family/caregiver.   - Collaborate with rehabilitation services on mobility goals if  consulted  - Perform Range of Motion 4 times a day.  - Reposition patient every 2 hours.  - Dangle patient 3 times a day  - Stand patient 3 times a day  - Ambulate patient 3 times a day  - Out of bed to chair 3 times a day   - Out of bed for meals 3 times a day  - Out of bed for toileting  - Record patient progress and toleration of activity level   Outcome: Progressing     Problem: DISCHARGE PLANNING  Goal: Discharge to home or other facility with appropriate resources  Description: INTERVENTIONS:  - Identify barriers to discharge w/patient and caregiver  - Arrange for needed discharge resources and transportation as appropriate  - Identify discharge learning needs (meds, wound care, etc.)  - Arrange for interpretive services to assist at discharge as needed  - Refer to Case Management Department for coordinating discharge planning if the patient needs post-hospital services based on physician/advanced practitioner order or complex needs related to functional status, cognitive ability, or social support system  Outcome: Progressing     Problem: Knowledge Deficit  Goal: Patient/family/caregiver demonstrates understanding of disease process, treatment plan, medications, and discharge instructions  Description: Complete learning assessment and assess knowledge base.  Interventions:  - Provide teaching at level of understanding  - Provide teaching via preferred learning methods  Outcome: Progressing     Problem: NEUROSENSORY - ADULT  Goal: Achieves stable or improved neurological status  Description: INTERVENTIONS  - Monitor and report changes in neurological status  - Monitor vital signs such as temperature, blood pressure, glucose, and any other labs ordered   - Initiate measures to prevent increased intracranial pressure  - Monitor for seizure activity and implement precautions if appropriate      Outcome: Progressing  Goal: Remains free of injury related to seizures activity  Description: INTERVENTIONS  - Maintain  airway, patient safety  and administer oxygen as ordered  - Monitor patient for seizure activity, document and report duration and description of seizure to physician/advanced practitioner  - If seizure occurs,  ensure patient safety during seizure  - Reorient patient post seizure  - Seizure pads on all 4 side rails  - Instruct patient/family to notify RN of any seizure activity including if an aura is experienced  - Instruct patient/family to call for assistance with activity based on nursing assessment  - Administer anti-seizure medications if ordered    Outcome: Progressing  Goal: Achieves maximal functionality and self care  Description: INTERVENTIONS  - Monitor swallowing and airway patency with patient fatigue and changes in neurological status  - Encourage and assist patient to increase activity and self care.   - Encourage visually impaired, hearing impaired and aphasic patients to use assistive/communication devices  Outcome: Progressing     Problem: CARDIOVASCULAR - ADULT  Goal: Maintains optimal cardiac output and hemodynamic stability  Description: INTERVENTIONS:  - Monitor I/O, vital signs and rhythm  - Monitor for S/S and trends of decreased cardiac output  - Administer and titrate ordered vasoactive medications to optimize hemodynamic stability  - Assess quality of pulses, skin color and temperature  - Assess for signs of decreased coronary artery perfusion  - Instruct patient to report change in severity of symptoms  Outcome: Progressing  Goal: Absence of cardiac dysrhythmias or at baseline rhythm  Description: INTERVENTIONS:  - Continuous cardiac monitoring, vital signs, obtain 12 lead EKG if ordered  - Administer antiarrhythmic and heart rate control medications as ordered  - Monitor electrolytes and administer replacement therapy as ordered  Outcome: Progressing     Problem: RESPIRATORY - ADULT  Goal: Achieves optimal ventilation and oxygenation  Description: INTERVENTIONS:  - Assess for changes  in respiratory status  - Assess for changes in mentation and behavior  - Position to facilitate oxygenation and minimize respiratory effort  - Oxygen administered by appropriate delivery if ordered  - Initiate smoking cessation education as indicated  - Encourage broncho-pulmonary hygiene including cough, deep breathe, Incentive Spirometry  - Assess the need for suctioning and aspirate as needed  - Assess and instruct to report SOB or any respiratory difficulty  - Respiratory Therapy support as indicated  Outcome: Progressing     Problem: GASTROINTESTINAL - ADULT  Goal: Minimal or absence of nausea and/or vomiting  Description: INTERVENTIONS:  - Administer IV fluids if ordered to ensure adequate hydration  - Maintain NPO status until nausea and vomiting are resolved  - Nasogastric tube if ordered  - Administer ordered antiemetic medications as needed  - Provide nonpharmacologic comfort measures as appropriate  - Advance diet as tolerated, if ordered  - Consider nutrition services referral to assist patient with adequate nutrition and appropriate food choices  Outcome: Progressing  Goal: Maintains or returns to baseline bowel function  Description: INTERVENTIONS:  - Assess bowel function  - Encourage oral fluids to ensure adequate hydration  - Administer IV fluids if ordered to ensure adequate hydration  - Administer ordered medications as needed  - Encourage mobilization and activity  - Consider nutritional services referral to assist patient with adequate nutrition and appropriate food choices  Outcome: Progressing  Goal: Maintains adequate nutritional intake  Description: INTERVENTIONS:  - Monitor percentage of each meal consumed  - Identify factors contributing to decreased intake, treat as appropriate  - Assist with meals as needed  - Monitor I&O, weight, and lab values if indicated  - Obtain nutrition services referral as needed  Outcome: Progressing  Goal: Establish and maintain optimal ostomy  function  Description: INTERVENTIONS:  - Assess bowel function  - Encourage oral fluids to ensure adequate hydration  - Administer IV fluids if ordered to ensure adequate hydration   - Administer ordered medications as needed  - Encourage mobilization and activity  - Nutrition services referral to assist patient with appropriate food choices  - Assess stoma site  - Consider wound care consult   Outcome: Progressing  Goal: Oral mucous membranes remain intact  Description: INTERVENTIONS  - Assess oral mucosa and hygiene practices  - Implement preventative oral hygiene regimen  - Implement oral medicated treatments as ordered  - Initiate Nutrition services referral as needed  Outcome: Progressing     Problem: GENITOURINARY - ADULT  Goal: Maintains or returns to baseline urinary function  Description: INTERVENTIONS:  - Assess urinary function  - Encourage oral fluids to ensure adequate hydration if ordered  - Administer IV fluids as ordered to ensure adequate hydration  - Administer ordered medications as needed  - Offer frequent toileting  - Follow urinary retention protocol if ordered  Outcome: Progressing  Goal: Absence of urinary retention  Description: INTERVENTIONS:  - Assess patient’s ability to void and empty bladder  - Monitor I/O  - Bladder scan as needed  - Discuss with physician/AP medications to alleviate retention as needed  - Discuss catheterization for long term situations as appropriate  Outcome: Progressing  Goal: Urinary catheter remains patent  Description: INTERVENTIONS:  - Assess patency of urinary catheter  - If patient has a chronic ibrahim, consider changing catheter if non-functioning  - Follow guidelines for intermittent irrigation of non-functioning urinary catheter  Outcome: Progressing     Problem: METABOLIC, FLUID AND ELECTROLYTES - ADULT  Goal: Electrolytes maintained within normal limits  Description: INTERVENTIONS:  - Monitor labs and assess patient for signs and symptoms of electrolyte  imbalances  - Administer electrolyte replacement as ordered  - Monitor response to electrolyte replacements, including repeat lab results as appropriate  - Instruct patient on fluid and nutrition as appropriate  Outcome: Progressing  Goal: Fluid balance maintained  Description: INTERVENTIONS:  - Monitor labs   - Monitor I/O and WT  - Instruct patient on fluid and nutrition as appropriate  - Assess for signs & symptoms of volume excess or deficit  Outcome: Progressing  Goal: Glucose maintained within target range  Description: INTERVENTIONS:  - Monitor Blood Glucose as ordered  - Assess for signs and symptoms of hyperglycemia and hypoglycemia  - Administer ordered medications to maintain glucose within target range  - Assess nutritional intake and initiate nutrition service referral as needed  Outcome: Progressing     Problem: SKIN/TISSUE INTEGRITY - ADULT  Goal: Skin Integrity remains intact(Skin Breakdown Prevention)  Description: Assess:  -Perform Jose assessment every shift  -Clean and moisturize skin every shift  -Inspect skin when repositioning, toileting, and assisting with ADLS  -Assess under medical devices such as IV every shift  -Assess extremities for adequate circulation and sensation     Bed Management:  -Have minimal linens on bed & keep smooth, unwrinkled  -Change linens as needed when moist or perspiring  -Avoid sitting or lying in one position for more than 2 hours while in bed  -Keep HOB at 30 degrees     Toileting:  -Offer bedside commode  -Assess for incontinence every shift  -Use incontinent care products after each incontinent episode such as foam cleanser    Activity:  -Mobilize patient 3 times a day  -Encourage activity and walks on unit  -Encourage or provide ROM exercises   -Turn and reposition patient every 2 Hours  -Use appropriate equipment to lift or move patient in bed  -Instruct/ Assist with weight shifting every hour when out of bed in chair  -Consider limitation of chair time 1  hour intervals    Skin Care:  -Avoid use of baby powder, tape, friction and shearing, hot water or constrictive clothing  -Relieve pressure over bony prominences using pillows  -Do not massage red bony areas    Next Steps:  -Teach patient strategies to minimize risks such as skin breakdown   -Consider consults to  interdisciplinary teams such as wound care  Outcome: Progressing  Goal: Incision(s), wounds(s) or drain site(s) healing without S/S of infection  Description: INTERVENTIONS  - Assess and document dressing, incision, wound bed, drain sites and surrounding tissue  - Provide patient and family education  - Perform skin care/dressing changes every shift  Outcome: Progressing  Goal: Pressure injury heals and does not worsen  Description: Interventions:  - Implement low air loss mattress or specialty surface (Criteria met)  - Apply silicone foam dressing  - Instruct/assist with weight shifting every 30  minutes when in chair   - Limit chair time to 1 hour intervals  - Use special pressure reducing interventions such as cushion when in chair   - Apply fecal or urinary incontinence containment device   - Perform passive or active ROM every shift  - Turn and reposition patient & offload bony prominences every 2 hours   - Utilize friction reducing device or surface for transfers   - Consider consults to  interdisciplinary teams such as wound care  - Use incontinent care products after each incontinent episode such as foam cleanser  - Consider nutrition services referral as needed  Outcome: Progressing     Problem: HEMATOLOGIC - ADULT  Goal: Maintains hematologic stability  Description: INTERVENTIONS  - Assess for signs and symptoms of bleeding or hemorrhage  - Monitor labs  - Administer supportive blood products/factors as ordered and appropriate  Outcome: Progressing     Problem: MUSCULOSKELETAL - ADULT  Goal: Maintain or return mobility to safest level of function  Description: INTERVENTIONS:  - Assess patient's  ability to carry out ADLs; assess patient's baseline for ADL function and identify physical deficits which impact ability to perform ADLs (bathing, care of mouth/teeth, toileting, grooming, dressing, etc.)  - Assess/evaluate cause of self-care deficits   - Assess range of motion  - Assess patient's mobility  - Assess patient's need for assistive devices and provide as appropriate  - Encourage maximum independence but intervene and supervise when necessary  - Involve family in performance of ADLs  - Assess for home care needs following discharge   - Consider OT consult to assist with ADL evaluation and planning for discharge  - Provide patient education as appropriate  Outcome: Progressing  Goal: Maintain proper alignment of affected body part  Description: INTERVENTIONS:  - Support, maintain and protect limb and body alignment  - Provide patient/ family with appropriate education  Outcome: Progressing     Problem: SAFETY,RESTRAINT: NV/NON-SELF DESTRUCTIVE BEHAVIOR  Goal: Remains free of harm/injury (restraint for non violent/non self-detsructive behavior)  Description: INTERVENTIONS:  - Instruct patient/family regarding restraint use   - Assess and monitor physiologic and psychological status   - Provide interventions and comfort measures to meet assessed patient needs   - Identify and implement measures to help patient regain control  - Assess readiness for release of restraint   Outcome: Progressing  Goal: Returns to optimal restraint-free functioning  Description: INTERVENTIONS:  - Assess the patient's behavior and symptoms that indicate continued need for restraint  - Identify and implement measures to help patient regain control  - Assess readiness for release of restraint   Outcome: Progressing     Problem: Prexisting or High Potential for Compromised Skin Integrity  Goal: Skin integrity is maintained or improved  Description: INTERVENTIONS:  - Identify patients at risk for skin breakdown  - Assess and monitor  skin integrity including under and around medical devices   - Assess and monitor nutrition and hydration status  - Monitor labs  - Assess for incontinence   - Turn and reposition patient  - Assist with mobility/ambulation  - Relieve pressure over keith prominences   - Avoid friction and shearing  - Provide appropriate hygiene as needed including keeping skin clean and dry  - Evaluate need for skin moisturizer/barrier cream  - Collaborate with interdisciplinary team  - Patient/family teaching  - Consider wound care consult    Assess:  - Review Jose scale daily  - Clean and moisturize skin every shift  - Inspect skin when repositioning, toileting, and assisting with ADLS  - Assess under medical devices such as IV every shift  - Assess extremities for adequate circulation and sensation     Bed Management:  - Have minimal linens on bed & keep smooth, unwrinkled  - Change linens as needed when moist or perspiring  - Avoid sitting or lying in one position for more than 2 hours while in bed?Keep HOB at 30 degrees   - Toileting:  - Offer bedside commode  - Assess for incontinence every shift  - Use incontinent care products after each incontinent episode such as foam cleanser    Activity:  - Mobilize patient 3 times a day  - Encourage activity and walks on unit  - Encourage or provide ROM exercises   - Turn and reposition patient every 2 Hours  - Use appropriate equipment to lift or move patient in bed  - Instruct/ Assist with weight shifting every hour when out of bed in chair  - Consider limitation of chair time 12 hour intervals    Skin Care:  - Avoid use of baby powder, tape, friction and shearing, hot water or constrictive clothing  - Relieve pressure over bony prominences using pillows  - Do not massage red bony areas    Next Steps:  - Teach patient strategies to minimize risks such as skin breakdown  - Consider consults to  interdisciplinary teams such as wound care  Outcome: Progressing     Problem:  Nutrition/Hydration-ADULT  Goal: Nutrient/Hydration intake appropriate for improving, restoring or maintaining nutritional needs  Description: Monitor and assess patient's nutrition/hydration status for malnutrition. Collaborate with interdisciplinary team and initiate plan and interventions as ordered.  Monitor patient's weight and dietary intake as ordered or per policy. Utilize nutrition screening tool and intervene as necessary. Determine patient's food preferences and provide high-protein, high-caloric foods as appropriate.     INTERVENTIONS:  - Monitor oral intake, urinary output, labs, and treatment plans  - Assess nutrition and hydration status and recommend course of action  - Evaluate amount of meals eaten  - Assist patient with eating if necessary   - Allow adequate time for meals  - Recommend/ encourage appropriate diets, oral nutritional supplements, and vitamin/mineral supplements  - Order, calculate, and assess calorie counts as needed  - Recommend, monitor, and adjust tube feedings and TPN/PPN based on assessed needs  - Assess need for intravenous fluids  - Provide specific nutrition/hydration education as appropriate  - Include patient/family/caregiver in decisions related to nutrition  Outcome: Progressing     Problem: MOBILITY - ADULT  Goal: Maintain or return to baseline ADL function  Description: INTERVENTIONS:  -  Assess patient's ability to carry out ADLs; assess patient's baseline for ADL function and identify physical deficits which impact ability to perform ADLs (bathing, care of mouth/teeth, toileting, grooming, dressing, etc.)  - Assess/evaluate cause of self-care deficits   - Assess range of motion  - Assess patient's mobility; develop plan if impaired  - Assess patient's need for assistive devices and provide as appropriate  - Encourage maximum independence but intervene and supervise when necessary  - Involve family in performance of ADLs  - Assess for home care needs following  discharge   - Consider OT consult to assist with ADL evaluation and planning for discharge  - Provide patient education as appropriate  - Monitor functional capacity and physical performance, use of AM PAC & -HLM   - Monitor gait, balance and fatigue with ambulation    Outcome: Progressing  Goal: Maintains/Returns to pre admission functional level  Description: INTERVENTIONS:  - Perform AM-PAC 6 Click Basic Mobility/ Daily Activity assessment daily.  - Set and communicate daily mobility goal to care team and patient/family/caregiver.   - Collaborate with rehabilitation services on mobility goals if consulted  - Perform Range of Motion 4 times a day.  - Reposition patient every 2 hours.  - Dangle patient 3 times a day  - Stand patient 3 times a day  - Ambulate patient 3 times a day  - Out of bed to chair 3 times a day   - Out of bed for meals 3 times a day  - Out of bed for toileting  - Record patient progress and toleration of activity level   Outcome: Progressing

## 2025-05-28 NOTE — ASSESSMENT & PLAN NOTE
No evidence of blood loss.  Patient agreeable for transfusion 1 unit PRBC    Results from last 7 days   Lab Units 05/28/25  0508 05/27/25  0430 05/26/25  0541 05/25/25  0450 05/24/25  0441 05/23/25  1855   HEMOGLOBIN g/dL 6.5* 7.2* 8.3* 9.5* 8.6* 9.7*

## 2025-05-28 NOTE — ASSESSMENT & PLAN NOTE
ZENAIDA most likely secondary to prerenal azotemia secondary to osmotic diuresis plus some component of intravascular volume depletion in light of hypoalbuminemia plus cholemic nephrosis with subsequent ATN versus HRS in the presence of acute liver injury  After review of records it appears that the patient has a baseline Creatinine of 1.5-2.0 mg/dL.  Admission creatinine of 1.99 mg/dL on 5/23.  Creatinine today is at 2.53 mg/dL unfortunately continue deteriorate however appears to be plateauing.  check BMP, magnesium, phosphorus in a.m.  Continue albumin 25 g IV Q6 for 48 hours  Discussed with patient we will have to wait a period of 3 months to see where new baseline creatinine would be  Hold off on diuretics  Await renal recovery.  Place on a renal diet when allowed diet order.   CT abdomen with contrast/26/25 no hydronephrosis unremarkable kidneys  Strict I/O.  Daily weights  Urinary retention protocol  Avoid nephrotoxins, adjust meds to appropriate GFR.  Likely has underlying CKD secondary to poorly controlled diabetes plus hypertensive nephrosclerosis plus age-related nephron loss  Outpatient for nephrology follows up with nurse practitioner Tanika last seen 2019

## 2025-05-28 NOTE — PHYSICAL THERAPY NOTE
PHYSICAL THERAPY NOTE          Patient Name: Wes Araujo  Today's Date: 5/28/2025 05/28/25 1115   PT Last Visit   PT Visit Date 05/28/25   Note Type   Note type Cancelled Session   Cancel Reasons Medical status   Additional Comments PT consult received. Chart reviewed. PT eval deferred at this time as pt getting blood transfusion currently. Will continue to follow as appropriate.     Michele Arizmendi

## 2025-05-28 NOTE — ASSESSMENT & PLAN NOTE
Acute kidney injury on CKD 3 baseline creatinine approximately 1.5-2.0  Related to progressive liver dysfunction and dehydration  Started IV albumin.  Nephrology consulted.    Results from last 7 days   Lab Units 05/28/25  0508 05/27/25  0430 05/26/25  0541 05/24/25  1639 05/24/25  0613 05/24/25  0441 05/23/25  2155 05/23/25  1855   BUN mg/dL 22 21 16 12 12 12 13 12   CREATININE mg/dL 2.53* 2.47* 2.23* 1.72* 1.78* 1.72* 1.97* 1.99*   EGFR ml/min/1.73sq m 27 28 31 43 42 43 37 36

## 2025-05-28 NOTE — CASE MANAGEMENT
Case Management Assessment & Discharge Planning Note    Patient name Wes Araujo  Location [facility] 2 /[facility] 2 M* MRN [id number]  : 1970 Date 2025       Current Admission Date: 2025  Current Admission Diagnosis:Toxic metabolic encephalopathy   Patient Active Problem List    Diagnosis Date Noted    Acute kidney injury (HCC) 2025    CKD stage 3b, GFR 30-44 ml/min (HCC) 2025    Other specified anemias 2025    Elevated LFTs 2025    [place]ian  needed 2025    Electrolyte abnormality 2025    Acute liver failure 2025    Abdominal pain 2025    CKD stage 3a, GFR 45-59 ml/min (HCC) 2025    Alcohol-induced chronic pancreatitis (HCC) 2024    Toxic metabolic encephalopathy 2024    Seizure (HCC) 2022    Bilateral hearing loss 2020    Acute renal failure superimposed on stage 3 chronic kidney disease (HCC) 2019    Anemia 2019    Hyponatremia 2019    History of atrial fibrillation 2019    ZENAIDA (acute kidney injury) (HCC) 2019    Primary hypertension     Type 2 diabetes mellitus with hyperglycemia, with long-term current use of insulin (HCC)     Alcohol abuse     Paroxysmal A-fib (HCC)       LOS (days): 5  Geometric Mean LOS (GMLOS) (days):   Days to GMLOS:     OBJECTIVE:  PATIENT READMITTED TO HOSPITAL  Risk of Unplanned Readmission Score: 27.87         Current admission status: Inpatient       Preferred Pharmacy:    [facility] [place] - [address] [place] [address]  Phone: [phone] Fax: [phone]    [facility] [place] - [address]  [facility],  [address]  [facility],  First [address] [facility] [address]  Phone: [phone] Fax: [phone]    [id number] [place] - [address]  [address] [facility] [address]  Phone: [phone] Fax: [phone]    Primary Care Provider: [facility]  Cameron Kebede MD    Primary Insurance: [email or web address]  Secondary Insurance:     ASSESSMENT:  Active Health Care Proxies       Wes Araujo [facility] Care Representative - Father   Primary Phone: [phone] (Mobile)  Home Phone: [phone]                 Advance Directives  Primary Contact: Wes (father) [phone]         Readmission Root Cause  30 Day Readmission: Yes  Did you feel medically stable to leave the hospital?:  (left AMA)  Were you able to pay for your medication at the pharmacy?: Unable to Assess  Did you have reliable transportation to take you to your appointments?: Unable to Assess  During previous admission, was a post-acute recommendation made?: No  Patient was readmitted due to: metabolic encephalopathy, hyperglycemia, liver failure    Patient Information  Admitted from:: Home  Mental Status: Alert  During Assessment patient was accompanied by: Not accompanied during assessment  Assessment information provided by:: Patient (chart review)  Primary Caregiver: Self  Support Systems: Family members  County of Residence: [place]  What city do you live in?: [place]  Home entry access options. Select all that apply.: Stairs  Number of steps to enter home.: One Flight  Type of Current Residence: Apartment  Floor Level: 2  Upon entering residence, is there a bedroom on the main floor (no further steps)?: Yes  Upon entering residence, is there a bathroom on the main floor (no further steps)?: Yes  Living Arrangements: Lives w/ Parent(s)    Activities of Daily Living Prior to Admission  Functional Status: Independent  Completes ADLs independently?: Yes  Ambulates independently?: Yes  Does patient use assisted devices?: No  Does patient currently own DME?: No  Does patient have a history of Outpatient Therapy (PT/OT)?: No  Does the patient have a history of Short-Term Rehab?: No  Does patient have a history of HHC?: No  Does patient currently have HHC?: No         Patient  Information Continued  Does patient have prescription coverage?: Yes  Can the patient afford their medications and any related supplies (such as glucometers or test strips)?: Yes  Does patient receive dialysis treatments?: No  Does patient have a history of substance abuse?: Yes  Historical substance use preference: Alcohol/ETOH  History of Withdrawal Symptoms: Delirium tremors  Is patient currently in treatment for substance abuse?: N/A - sober  Does patient have a history of Mental Health Diagnosis?: No         Means of Transportation  Means of Transport to Appts:: Family transport          DISCHARGE DETAILS:    Discharge planning discussed with:: Patient                                     Other Referral/Resources/Interventions Provided:  Interventions: None Indicated                                                      Additional Comments: CM met with pt at bedside, introduced self and explained role. Utilized interpretor #[id number]. Pt was able to answer limited questions d/t being [employer]. Denies any changes since last admission. Completed chart review. Pt resides with father in 2nd floor apt in [place]. Pt left AMA during last admission. PT recommendations pending. CM dept to continue to follow.

## 2025-05-28 NOTE — ASSESSMENT & PLAN NOTE
Evidence of worsening hyperbilirubinemia and coagulopathy  Being followed by gastroenterology.  Discriminant function does not meet criteria to initiate steroids for possible alcoholic hepatitis  Ruling out infection: Cultures no growth    Results from last 7 days   Lab Units 05/28/25  0508 05/27/25  0430 05/26/25  0541 05/25/25  0450 05/24/25  0441 05/23/25  1855   AST U/L 43* 49* 51* 60* 43* 48*   ALT U/L 27 33 34 37 42 51   TOTAL BILIRUBIN mg/dL 7.26* 7.14* 3.61* 3.17* 2.93* 4.18*     Results from last 7 days   Lab Units 05/28/25  0508 05/27/25  0430 05/26/25  0541 05/25/25  0450   INR  1.06 1.11 5.53* 4.09*

## 2025-05-28 NOTE — ASSESSMENT & PLAN NOTE
Lab Results   Component Value Date    HGBA1C 9.6 (H) 05/26/2025     Recent Labs     05/27/25  1618 05/27/25  2105 05/28/25  0755 05/28/25  1106   POCGLU 240* 153* 139 168*     HHNK upon arrival likely related to noncompliance with medications and alcohol use  Restarted on 70/30 5 units twice daily  Continue sliding scale

## 2025-05-28 NOTE — ASSESSMENT & PLAN NOTE
Most recent serum sodium 134 mill equivalents corrected for hyperglycemia within normal limits  Check BMP in a.m.

## 2025-05-29 PROBLEM — E87.5 HYPERKALEMIA: Status: ACTIVE | Noted: 2025-05-29

## 2025-05-29 LAB
A1AT SERPL-MCNC: 142 MG/DL (ref 101–187)
ABO GROUP BLD BPU: NORMAL
ALBUMIN SERPL BCG-MCNC: 3.7 G/DL (ref 3.5–5)
ALP SERPL-CCNC: 422 U/L (ref 34–104)
ALT SERPL W P-5'-P-CCNC: 22 U/L (ref 7–52)
ANION GAP SERPL CALCULATED.3IONS-SCNC: 5 MMOL/L (ref 4–13)
AST SERPL W P-5'-P-CCNC: 33 U/L (ref 13–39)
BILIRUB SERPL-MCNC: 7.91 MG/DL (ref 0.2–1)
BPU ID: NORMAL
BUN SERPL-MCNC: 21 MG/DL (ref 5–25)
CALCIUM SERPL-MCNC: 9 MG/DL (ref 8.4–10.2)
CHLORIDE SERPL-SCNC: 109 MMOL/L (ref 96–108)
CO2 SERPL-SCNC: 21 MMOL/L (ref 21–32)
CREAT SERPL-MCNC: 2.41 MG/DL (ref 0.6–1.3)
CROSSMATCH: NORMAL
ERYTHROCYTE [DISTWIDTH] IN BLOOD BY AUTOMATED COUNT: 13.9 % (ref 11.6–15.1)
GFR SERPL CREATININE-BSD FRML MDRD: 29 ML/MIN/1.73SQ M
GLUCOSE SERPL-MCNC: 125 MG/DL (ref 65–140)
GLUCOSE SERPL-MCNC: 142 MG/DL (ref 65–140)
GLUCOSE SERPL-MCNC: 143 MG/DL (ref 65–140)
GLUCOSE SERPL-MCNC: 165 MG/DL (ref 65–140)
GLUCOSE SERPL-MCNC: 96 MG/DL (ref 65–140)
HCT VFR BLD AUTO: 20.9 % (ref 36.5–49.3)
HGB BLD-MCNC: 7.2 G/DL (ref 12–17)
MCH RBC QN AUTO: 31.4 PG (ref 26.8–34.3)
MCHC RBC AUTO-ENTMCNC: 34.4 G/DL (ref 31.4–37.4)
MCV RBC AUTO: 91 FL (ref 82–98)
PHOSPHATE SERPL-MCNC: 2.4 MG/DL (ref 2.7–4.5)
PLATELET # BLD AUTO: 219 THOUSANDS/UL (ref 149–390)
PMV BLD AUTO: 11.2 FL (ref 8.9–12.7)
POTASSIUM SERPL-SCNC: 5.6 MMOL/L (ref 3.5–5.3)
PROT SERPL-MCNC: 5.3 G/DL (ref 6.4–8.4)
RBC # BLD AUTO: 2.29 MILLION/UL (ref 3.88–5.62)
SODIUM SERPL-SCNC: 135 MMOL/L (ref 135–147)
UNIT DISPENSE STATUS: NORMAL
UNIT PRODUCT CODE: NORMAL
UNIT PRODUCT VOLUME: 350 ML
UNIT RH: NORMAL
WBC # BLD AUTO: 9.7 THOUSAND/UL (ref 4.31–10.16)

## 2025-05-29 PROCEDURE — 99232 SBSQ HOSP IP/OBS MODERATE 35: CPT | Performed by: INTERNAL MEDICINE

## 2025-05-29 PROCEDURE — 97535 SELF CARE MNGMENT TRAINING: CPT

## 2025-05-29 PROCEDURE — 85027 COMPLETE CBC AUTOMATED: CPT | Performed by: INTERNAL MEDICINE

## 2025-05-29 PROCEDURE — 84100 ASSAY OF PHOSPHORUS: CPT | Performed by: INTERNAL MEDICINE

## 2025-05-29 PROCEDURE — 97163 PT EVAL HIGH COMPLEX 45 MIN: CPT

## 2025-05-29 PROCEDURE — 80053 COMPREHEN METABOLIC PANEL: CPT | Performed by: INTERNAL MEDICINE

## 2025-05-29 PROCEDURE — 82948 REAGENT STRIP/BLOOD GLUCOSE: CPT

## 2025-05-29 PROCEDURE — 97530 THERAPEUTIC ACTIVITIES: CPT

## 2025-05-29 RX ORDER — BUMETANIDE 0.25 MG/ML
2 INJECTION, SOLUTION INTRAMUSCULAR; INTRAVENOUS ONCE
Status: COMPLETED | OUTPATIENT
Start: 2025-05-29 | End: 2025-05-29

## 2025-05-29 RX ORDER — DEXTROSE MONOHYDRATE 25 G/50ML
25 INJECTION, SOLUTION INTRAVENOUS ONCE
Status: COMPLETED | OUTPATIENT
Start: 2025-05-29 | End: 2025-05-29

## 2025-05-29 RX ORDER — ALBUMIN (HUMAN) 12.5 G/50ML
25 SOLUTION INTRAVENOUS EVERY 6 HOURS
Status: COMPLETED | OUTPATIENT
Start: 2025-05-29 | End: 2025-05-30

## 2025-05-29 RX ADMIN — ALBUMIN (HUMAN) 25 G: 0.25 INJECTION, SOLUTION INTRAVENOUS at 17:42

## 2025-05-29 RX ADMIN — BUMETANIDE 2 MG: 0.25 INJECTION INTRAMUSCULAR; INTRAVENOUS at 12:53

## 2025-05-29 RX ADMIN — HYDRALAZINE HYDROCHLORIDE 25 MG: 25 TABLET ORAL at 05:24

## 2025-05-29 RX ADMIN — AMLODIPINE BESYLATE 5 MG: 5 TABLET ORAL at 17:42

## 2025-05-29 RX ADMIN — INSULIN ASPART 5 UNITS: 100 INJECTION, SUSPENSION SUBCUTANEOUS at 17:44

## 2025-05-29 RX ADMIN — INSULIN LISPRO 1 UNITS: 100 INJECTION, SOLUTION INTRAVENOUS; SUBCUTANEOUS at 22:33

## 2025-05-29 RX ADMIN — AMLODIPINE BESYLATE 5 MG: 5 TABLET ORAL at 08:11

## 2025-05-29 RX ADMIN — HYDRALAZINE HYDROCHLORIDE 25 MG: 25 TABLET ORAL at 22:32

## 2025-05-29 RX ADMIN — ALBUMIN (HUMAN) 25 G: 0.25 INJECTION, SOLUTION INTRAVENOUS at 02:54

## 2025-05-29 RX ADMIN — INSULIN ASPART 5 UNITS: 100 INJECTION, SUSPENSION SUBCUTANEOUS at 08:11

## 2025-05-29 RX ADMIN — SODIUM CHLORIDE 10 UNITS: 9 INJECTION INTRAMUSCULAR; INTRAVENOUS; SUBCUTANEOUS at 12:52

## 2025-05-29 RX ADMIN — HYDRALAZINE HYDROCHLORIDE 25 MG: 25 TABLET ORAL at 14:35

## 2025-05-29 RX ADMIN — DEXTROSE MONOHYDRATE 25 ML: 25 INJECTION, SOLUTION INTRAVENOUS at 12:52

## 2025-05-29 RX ADMIN — ALBUMIN (HUMAN) 25 G: 0.25 INJECTION, SOLUTION INTRAVENOUS at 09:56

## 2025-05-29 RX ADMIN — Medication 100 MG: at 08:11

## 2025-05-29 RX ADMIN — ALBUMIN (HUMAN) 25 G: 0.25 INJECTION, SOLUTION INTRAVENOUS at 12:52

## 2025-05-29 RX ADMIN — FOLIC ACID 1 MG: 1 TABLET ORAL at 08:11

## 2025-05-29 RX ADMIN — ALBUMIN (HUMAN) 25 G: 0.25 INJECTION, SOLUTION INTRAVENOUS at 22:46

## 2025-05-29 NOTE — PLAN OF CARE
Problem: PAIN - ADULT  Goal: Verbalizes/displays adequate comfort level or baseline comfort level  Description: Interventions:  - Encourage patient to monitor pain and request assistance  - Assess pain using appropriate pain scale  - Administer analgesics as ordered based on type and severity of pain and evaluate response  - Implement non-pharmacological measures as appropriate and evaluate response  - Consider cultural and social influences on pain and pain management  - Notify physician/advanced practitioner if interventions unsuccessful or patient reports new pain  - Educate patient/family on pain management process including their role and importance of  reporting pain   - Provide non-pharmacologic/complimentary pain relief interventions  Outcome: Progressing     Problem: INFECTION - ADULT  Goal: Absence or prevention of progression during hospitalization  Description: INTERVENTIONS:  - Assess and monitor for signs and symptoms of infection  - Monitor lab/diagnostic results  - Monitor all insertion sites, i.e. indwelling lines, tubes, and drains  - Monitor endotracheal if appropriate and nasal secretions for changes in amount and color  - [place] appropriate cooling/warming therapies per order  - Administer medications as ordered  - Instruct and encourage patient and family to use good hand hygiene technique  - Identify and instruct in appropriate isolation precautions for identified infection/condition  Outcome: Progressing  Goal: Absence of fever/infection during neutropenic period  Description: INTERVENTIONS:  - Monitor WBC  - Perform strict hand hygiene  - Limit to healthy visitors only  - No plants, dried, fresh or silk flowers with mcclure in patient room  Outcome: Progressing     Problem: SAFETY ADULT  Goal: Patient will remain free of falls  Description: INTERVENTIONS:  - Educate patient/family on patient safety including physical limitations  - Instruct patient to call for assistance with activity   -  Consider consulting OT/PT to assist with strengthening/mobility based on AM PAC & JH-HLM score  - Consult OT/PT to assist with strengthening/mobility   - Keep Call bell within reach  - Keep bed low and locked with side rails adjusted as appropriate  - Keep care items and personal belongings within reach  - Initiate and maintain comfort rounds  - Make Fall Risk Sign visible to staff  - Offer Toileting every 2 Hours, in advance of need  - Initiate/Maintain bed alarm  - Obtain necessary fall risk management equipment: alarms  - Apply yellow socks and bracelet for high fall risk patients  - Consider moving patient to room near nurses station  Outcome: Progressing  Goal: Maintain or return to baseline ADL function  Description: INTERVENTIONS:  -  Assess patient's ability to carry out ADLs; assess patient's baseline for ADL function and identify physical deficits which impact ability to perform ADLs (bathing, care of mouth/teeth, toileting, grooming, dressing, etc.)  - Assess/evaluate cause of self-care deficits   - Assess range of motion  - Assess patient's mobility; develop plan if impaired  - Assess patient's need for assistive devices and provide as appropriate  - Encourage maximum independence but intervene and supervise when necessary  - Involve family in performance of ADLs  - Assess for home care needs following discharge   - Consider OT consult to assist with ADL evaluation and planning for discharge  - Provide patient education as appropriate  - Monitor functional capacity and physical performance, use of AM PAC & JH-HLM   - Monitor gait, balance and fatigue with ambulation    Outcome: Progressing  Goal: Maintains/Returns to pre admission functional level  Description: INTERVENTIONS:  - Perform AM-PAC 6 Click Basic Mobility/ Daily Activity assessment daily.  - Set and communicate daily mobility goal to care team and patient/family/caregiver.   - Collaborate with rehabilitation services on mobility goals if  consulted  - Perform Range of Motion 4 times a day.  - Reposition patient every 2 hours.  - Dangle patient 3 times a day  - Stand patient 3 times a day  - Ambulate patient 3 times a day  - Out of bed to chair 3 times a day   - Out of bed for meals 3 times a day  - Out of bed for toileting  - Record patient progress and toleration of activity level   Outcome: Progressing     Problem: DISCHARGE PLANNING  Goal: Discharge to home or other facility with appropriate resources  Description: INTERVENTIONS:  - Identify barriers to discharge w/patient and caregiver  - Arrange for needed discharge resources and transportation as appropriate  - Identify discharge learning needs (meds, wound care, etc.)  - Arrange for interpretive services to assist at discharge as needed  - Refer to Case Management Department for coordinating discharge planning if the patient needs post-hospital services based on physician/advanced practitioner order or complex needs related to functional status, cognitive ability, or social support system  Outcome: Progressing     Problem: Knowledge Deficit  Goal: Patient/family/caregiver demonstrates understanding of disease process, treatment plan, medications, and discharge instructions  Description: Complete learning assessment and assess knowledge base.  Interventions:  - Provide teaching at level of understanding  - Provide teaching via preferred learning methods  Outcome: Progressing     Problem: NEUROSENSORY - ADULT  Goal: Achieves stable or improved neurological status  Description: INTERVENTIONS  - Monitor and report changes in neurological status  - Monitor vital signs such as temperature, blood pressure, glucose, and any other labs ordered   - Initiate measures to prevent increased intracranial pressure  - Monitor for seizure activity and implement precautions if appropriate      Outcome: Progressing  Goal: Remains free of injury related to seizures activity  Description: INTERVENTIONS  - Maintain  airway, patient safety  and administer oxygen as ordered  - Monitor patient for seizure activity, document and report duration and description of seizure to physician/advanced practitioner  - If seizure occurs,  ensure patient safety during seizure  - Reorient patient post seizure  - Seizure pads on all 4 side rails  - Instruct patient/family to notify RN of any seizure activity including if an aura is experienced  - Instruct patient/family to call for assistance with activity based on nursing assessment  - Administer anti-seizure medications if ordered    Outcome: Progressing  Goal: Achieves maximal functionality and self care  Description: INTERVENTIONS  - Monitor swallowing and airway patency with patient fatigue and changes in neurological status  - Encourage and assist patient to increase activity and self care.   - Encourage visually impaired, hearing impaired and aphasic patients to use assistive/communication devices  Outcome: Progressing     Problem: CARDIOVASCULAR - ADULT  Goal: Maintains optimal cardiac output and hemodynamic stability  Description: INTERVENTIONS:  - Monitor I/O, vital signs and rhythm  - Monitor for S/S and trends of decreased cardiac output  - Administer and titrate ordered vasoactive medications to optimize hemodynamic stability  - Assess quality of pulses, skin color and temperature  - Assess for signs of decreased coronary artery perfusion  - Instruct patient to report change in severity of symptoms  Outcome: Progressing  Goal: Absence of cardiac dysrhythmias or at baseline rhythm  Description: INTERVENTIONS:  - Continuous cardiac monitoring, vital signs, obtain 12 lead EKG if ordered  - Administer antiarrhythmic and heart rate control medications as ordered  - Monitor electrolytes and administer replacement therapy as ordered  Outcome: Progressing     Problem: RESPIRATORY - ADULT  Goal: Achieves optimal ventilation and oxygenation  Description: INTERVENTIONS:  - Assess for changes  in respiratory status  - Assess for changes in mentation and behavior  - Position to facilitate oxygenation and minimize respiratory effort  - Oxygen administered by appropriate delivery if ordered  - Initiate smoking cessation education as indicated  - Encourage broncho-pulmonary hygiene including cough, deep breathe, Incentive Spirometry  - Assess the need for suctioning and aspirate as needed  - Assess and instruct to report SOB or any respiratory difficulty  - Respiratory Therapy support as indicated  Outcome: Progressing     Problem: GASTROINTESTINAL - ADULT  Goal: Minimal or absence of nausea and/or vomiting  Description: INTERVENTIONS:  - Administer IV fluids if ordered to ensure adequate hydration  - Maintain NPO status until nausea and vomiting are resolved  - Nasogastric tube if ordered  - Administer ordered antiemetic medications as needed  - Provide nonpharmacologic comfort measures as appropriate  - Advance diet as tolerated, if ordered  - Consider nutrition services referral to assist patient with adequate nutrition and appropriate food choices  Outcome: Progressing  Goal: Maintains or returns to baseline bowel function  Description: INTERVENTIONS:  - Assess bowel function  - Encourage oral fluids to ensure adequate hydration  - Administer IV fluids if ordered to ensure adequate hydration  - Administer ordered medications as needed  - Encourage mobilization and activity  - Consider nutritional services referral to assist patient with adequate nutrition and appropriate food choices  Outcome: Progressing  Goal: Maintains adequate nutritional intake  Description: INTERVENTIONS:  - Monitor percentage of each meal consumed  - Identify factors contributing to decreased intake, treat as appropriate  - Assist with meals as needed  - Monitor I&O, weight, and lab values if indicated  - Obtain nutrition services referral as needed  Outcome: Progressing  Goal: Establish and maintain optimal ostomy  function  Description: INTERVENTIONS:  - Assess bowel function  - Encourage oral fluids to ensure adequate hydration  - Administer IV fluids if ordered to ensure adequate hydration   - Administer ordered medications as needed  - Encourage mobilization and activity  - Nutrition services referral to assist patient with appropriate food choices  - Assess stoma site  - Consider wound care consult   Outcome: Progressing  Goal: Oral mucous membranes remain intact  Description: INTERVENTIONS  - Assess oral mucosa and hygiene practices  - Implement preventative oral hygiene regimen  - Implement oral medicated treatments as ordered  - Initiate Nutrition services referral as needed  Outcome: Progressing     Problem: GENITOURINARY - ADULT  Goal: Maintains or returns to baseline urinary function  Description: INTERVENTIONS:  - Assess urinary function  - Encourage oral fluids to ensure adequate hydration if ordered  - Administer IV fluids as ordered to ensure adequate hydration  - Administer ordered medications as needed  - Offer frequent toileting  - Follow urinary retention protocol if ordered  Outcome: Progressing  Goal: Absence of urinary retention  Description: INTERVENTIONS:  - Assess patient’s ability to void and empty bladder  - Monitor I/O  - Bladder scan as needed  - Discuss with physician/AP medications to alleviate retention as needed  - Discuss catheterization for long term situations as appropriate  Outcome: Progressing  Goal: Urinary catheter remains patent  Description: INTERVENTIONS:  - Assess patency of urinary catheter  - If patient has a chronic ibrahim, consider changing catheter if non-functioning  - Follow guidelines for intermittent irrigation of non-functioning urinary catheter  Outcome: Progressing     Problem: METABOLIC, FLUID AND ELECTROLYTES - ADULT  Goal: Electrolytes maintained within normal limits  Description: INTERVENTIONS:  - Monitor labs and assess patient for signs and symptoms of electrolyte  imbalances  - Administer electrolyte replacement as ordered  - Monitor response to electrolyte replacements, including repeat lab results as appropriate  - Instruct patient on fluid and nutrition as appropriate  Outcome: Progressing  Goal: Fluid balance maintained  Description: INTERVENTIONS:  - Monitor labs   - Monitor I/O and WT  - Instruct patient on fluid and nutrition as appropriate  - Assess for signs & symptoms of volume excess or deficit  Outcome: Progressing  Goal: Glucose maintained within target range  Description: INTERVENTIONS:  - Monitor Blood Glucose as ordered  - Assess for signs and symptoms of hyperglycemia and hypoglycemia  - Administer ordered medications to maintain glucose within target range  - Assess nutritional intake and initiate nutrition service referral as needed  Outcome: Progressing     Problem: SKIN/TISSUE INTEGRITY - ADULT  Goal: Skin Integrity remains intact(Skin Breakdown Prevention)  Description: Assess:  -Perform Jose assessment every shift  -Clean and moisturize skin every shift  -Inspect skin when repositioning, toileting, and assisting with ADLS  -Assess under medical devices such as IV every shift  -Assess extremities for adequate circulation and sensation     Bed Management:  -Have minimal linens on bed & keep smooth, unwrinkled  -Change linens as needed when moist or perspiring  -Avoid sitting or lying in one position for more than 2 hours while in bed  -Keep HOB at 30 degrees     Toileting:  -Offer bedside commode  -Assess for incontinence every shift  -Use incontinent care products after each incontinent episode such as foam cleanser    Activity:  -Mobilize patient 3 times a day  -Encourage activity and walks on unit  -Encourage or provide ROM exercises   -Turn and reposition patient every 2 Hours  -Use appropriate equipment to lift or move patient in bed  -Instruct/ Assist with weight shifting every hour when out of bed in chair  -Consider limitation of chair time 1  hour intervals    Skin Care:  -Avoid use of baby powder, tape, friction and shearing, hot water or constrictive clothing  -Relieve pressure over bony prominences using pillows  -Do not massage red bony areas    Next Steps:  -Teach patient strategies to minimize risks such as skin breakdown   -Consider consults to  interdisciplinary teams such as wound care  Outcome: Progressing  Goal: Incision(s), wounds(s) or drain site(s) healing without S/S of infection  Description: INTERVENTIONS  - Assess and document dressing, incision, wound bed, drain sites and surrounding tissue  - Provide patient and family education  - Perform skin care/dressing changes every shift  Outcome: Progressing  Goal: Pressure injury heals and does not worsen  Description: Interventions:  - Implement low air loss mattress or specialty surface (Criteria met)  - Apply silicone foam dressing  - Instruct/assist with weight shifting every 30  minutes when in chair   - Limit chair time to 1 hour intervals  - Use special pressure reducing interventions such as cushion when in chair   - Apply fecal or urinary incontinence containment device   - Perform passive or active ROM every shift  - Turn and reposition patient & offload bony prominences every 2 hours   - Utilize friction reducing device or surface for transfers   - Consider consults to  interdisciplinary teams such as wound care  - Use incontinent care products after each incontinent episode such as foam cleanser  - Consider nutrition services referral as needed  Outcome: Progressing     Problem: HEMATOLOGIC - ADULT  Goal: Maintains hematologic stability  Description: INTERVENTIONS  - Assess for signs and symptoms of bleeding or hemorrhage  - Monitor labs  - Administer supportive blood products/factors as ordered and appropriate  Outcome: Progressing     Problem: MUSCULOSKELETAL - ADULT  Goal: Maintain or return mobility to safest level of function  Description: INTERVENTIONS:  - Assess patient's  ability to carry out ADLs; assess patient's baseline for ADL function and identify physical deficits which impact ability to perform ADLs (bathing, care of mouth/teeth, toileting, grooming, dressing, etc.)  - Assess/evaluate cause of self-care deficits   - Assess range of motion  - Assess patient's mobility  - Assess patient's need for assistive devices and provide as appropriate  - Encourage maximum independence but intervene and supervise when necessary  - Involve family in performance of ADLs  - Assess for home care needs following discharge   - Consider OT consult to assist with ADL evaluation and planning for discharge  - Provide patient education as appropriate  Outcome: Progressing  Goal: Maintain proper alignment of affected body part  Description: INTERVENTIONS:  - Support, maintain and protect limb and body alignment  - Provide patient/ family with appropriate education  Outcome: Progressing     Problem: SAFETY,RESTRAINT: NV/NON-SELF DESTRUCTIVE BEHAVIOR  Goal: Remains free of harm/injury (restraint for non violent/non self-detsructive behavior)  Description: INTERVENTIONS:  - Instruct patient/family regarding restraint use   - Assess and monitor physiologic and psychological status   - Provide interventions and comfort measures to meet assessed patient needs   - Identify and implement measures to help patient regain control  - Assess readiness for release of restraint   Outcome: Progressing  Goal: Returns to optimal restraint-free functioning  Description: INTERVENTIONS:  - Assess the patient's behavior and symptoms that indicate continued need for restraint  - Identify and implement measures to help patient regain control  - Assess readiness for release of restraint   Outcome: Progressing     Problem: Prexisting or High Potential for Compromised Skin Integrity  Goal: Skin integrity is maintained or improved  Description: INTERVENTIONS:  - Identify patients at risk for skin breakdown  - Assess and monitor  skin integrity including under and around medical devices   - Assess and monitor nutrition and hydration status  - Monitor labs  - Assess for incontinence   - Turn and reposition patient  - Assist with mobility/ambulation  - Relieve pressure over keith prominences   - Avoid friction and shearing  - Provide appropriate hygiene as needed including keeping skin clean and dry  - Evaluate need for skin moisturizer/barrier cream  - Collaborate with interdisciplinary team  - Patient/family teaching  - Consider wound care consult    Assess:  - Review Jose scale daily  - Clean and moisturize skin every shift  - Inspect skin when repositioning, toileting, and assisting with ADLS  - Assess under medical devices such as IV every shift  - Assess extremities for adequate circulation and sensation     Bed Management:  - Have minimal linens on bed & keep smooth, unwrinkled  - Change linens as needed when moist or perspiring  - Avoid sitting or lying in one position for more than 2 hours while in bed?Keep HOB at 30 degrees   - Toileting:  - Offer bedside commode  - Assess for incontinence every shift  - Use incontinent care products after each incontinent episode such as foam cleanser    Activity:  - Mobilize patient 3 times a day  - Encourage activity and walks on unit  - Encourage or provide ROM exercises   - Turn and reposition patient every 2 Hours  - Use appropriate equipment to lift or move patient in bed  - Instruct/ Assist with weight shifting every hour when out of bed in chair  - Consider limitation of chair time 12 hour intervals    Skin Care:  - Avoid use of baby powder, tape, friction and shearing, hot water or constrictive clothing  - Relieve pressure over bony prominences using pillows  - Do not massage red bony areas    Next Steps:  - Teach patient strategies to minimize risks such as skin breakdown  - Consider consults to  interdisciplinary teams such as wound care  Outcome: Progressing     Problem:  Nutrition/Hydration-ADULT  Goal: Nutrient/Hydration intake appropriate for improving, restoring or maintaining nutritional needs  Description: Monitor and assess patient's nutrition/hydration status for malnutrition. Collaborate with interdisciplinary team and initiate plan and interventions as ordered.  Monitor patient's weight and dietary intake as ordered or per policy. Utilize nutrition screening tool and intervene as necessary. Determine patient's food preferences and provide high-protein, high-caloric foods as appropriate.     INTERVENTIONS:  - Monitor oral intake, urinary output, labs, and treatment plans  - Assess nutrition and hydration status and recommend course of action  - Evaluate amount of meals eaten  - Assist patient with eating if necessary   - Allow adequate time for meals  - Recommend/ encourage appropriate diets, oral nutritional supplements, and vitamin/mineral supplements  - Order, calculate, and assess calorie counts as needed  - Recommend, monitor, and adjust tube feedings and TPN/PPN based on assessed needs  - Assess need for intravenous fluids  - Provide specific nutrition/hydration education as appropriate  - Include patient/family/caregiver in decisions related to nutrition  Outcome: Progressing     Problem: MOBILITY - ADULT  Goal: Maintain or return to baseline ADL function  Description: INTERVENTIONS:  -  Assess patient's ability to carry out ADLs; assess patient's baseline for ADL function and identify physical deficits which impact ability to perform ADLs (bathing, care of mouth/teeth, toileting, grooming, dressing, etc.)  - Assess/evaluate cause of self-care deficits   - Assess range of motion  - Assess patient's mobility; develop plan if impaired  - Assess patient's need for assistive devices and provide as appropriate  - Encourage maximum independence but intervene and supervise when necessary  - Involve family in performance of ADLs  - Assess for home care needs following  discharge   - Consider OT consult to assist with ADL evaluation and planning for discharge  - Provide patient education as appropriate  - Monitor functional capacity and physical performance, use of AM PAC & -HLM   - Monitor gait, balance and fatigue with ambulation    Outcome: Progressing  Goal: Maintains/Returns to pre admission functional level  Description: INTERVENTIONS:  - Perform AM-PAC 6 Click Basic Mobility/ Daily Activity assessment daily.  - Set and communicate daily mobility goal to care team and patient/family/caregiver.   - Collaborate with rehabilitation services on mobility goals if consulted  - Perform Range of Motion 4 times a day.  - Reposition patient every 2 hours.  - Dangle patient 3 times a day  - Stand patient 3 times a day  - Ambulate patient 3 times a day  - Out of bed to chair 3 times a day   - Out of bed for meals 3 times a day  - Out of bed for toileting  - Record patient progress and toleration of activity level   Outcome: Progressing

## 2025-05-29 NOTE — PROGRESS NOTES
Progress Note - Gastroenterology   Name: Wes Araujo 55 y.o. male I MRN: [id number]  Unit/Bed#: [place] [address] -01 I Date of Admission: 5/23/2025   Date of Service: 5/29/2025 I Hospital Day: 6    Assessment & Plan  Elevated LFTs  55-year-old male with a past medical history of hypertension, CKD, diabetes mellitus type 2, chronic pancreatitis secondary to chronic alcohol use who presented to [facility] on 5/23 with altered mental status likely due to elevated blood glucose.  Fortunately now mental status has improved.  However, patient's LFTs were also significantly elevated - AST/ALT 48/51, alkaline phosphatase 1069, total bilirubin 4.18.  INR also elevated to 5.53 but now improved status post vitamin K.  Patient has had LFT elevations in the past.  Admission RUQ US with no acute findings including grossly stable liver and biliary tree.  Prior MRI imaging completed for evaluation of LFT elevation in 2024 also unremarkable.  Complete serologic liver workup completed and was negative for any competing cause of liver injury.  Suspect that LFT elevations are likely due to longstanding history of alcohol use with a component of alcoholic hepatitis. Suspect that this is superimposed on some degree of fibrosis.  Given improvement in INR and LFTs, no indication for steroids as MDF is currently less than 32.  Ultimately recommend conservative management from GI perspective.  Will require close outpatient GI/hepatology follow-up to ensure LFTs have improved and also for consideration of liver biopsy versus alternative imaging to further risk stratify patient.  Also strongly recommend complete alcohol cessation moving forward.    Plan:  Monitor and trend LFTs daily along with INR  Avoid hepatotoxic medications, strongly encourage complete alcohol cessation  Consider liver biopsy outpatient given significant alkaline phosphatase elevation  Additional pain and symptom management per primary team  Will require outpatient GI/hepatology  follow-up after discharge  Toxic metabolic encephalopathy  Patient presenting with altered mental status.  Likely due to significantly elevated blood glucose from insulin noncompliance.  Fortunately encephalopathy appears to have now improved.  Management per primary team.  Alcohol abuse  Patient also longstanding history of alcohol use.  Suspect that he has some degree of advanced fibrosis.  Recommend complete alcohol cessation moving forward and follow-up outpatient with GI/hepatology.  Acute renal failure superimposed on stage 3 chronic kidney disease (HCC)  Lab Results   Component Value Date    EGFR 29 05/29/2025    EGFR 27 05/28/2025    EGFR 28 05/27/2025    CREATININE 2.41 (H) 05/29/2025    CREATININE 2.53 (H) 05/28/2025    CREATININE 2.47 (H) 05/27/2025     Patient with elevated creatinine on admission which continues to worsen today.  Patient with no history of cirrhosis and therefore this would not be consistent with HRS.  Nephrology currently consulted and will defer management to nephrology and primary teams.    Please contact the SecureChat role for the Gastroenterology service with any questions/concerns.    Subjective  Patient seen and examined at bedside.  Sitting in bed comfortably no acute distress or visible discomfort.  Asymptomatic at this time.  Denies abdominal pain.  Tolerating breakfast.  Offers no additional complaints.    Objective :  Temp:  [98.2 °F (36.8 °C)-99.1 °F (37.3 °C)] 99.1 °F (37.3 °C)  HR:  [83-98] 91  BP: (151-176)/(74-84) 168/84  Resp:  [18-20] 18  SpO2:  [97 %-99 %] 97 %  O2 Device: None (Room air)    Physical Exam  Constitutional:       General: He is not in acute distress.     Appearance: He is normal weight. He is not ill-appearing or toxic-appearing.   HENT:      Head: Normocephalic and atraumatic.     Eyes:      General: Scleral icterus present.       Cardiovascular:      Rate and Rhythm: Normal rate.      Pulses: Normal pulses.   Pulmonary:      Effort: Pulmonary effort  is normal.   Abdominal:      General: Abdomen is flat. There is no distension.      Palpations: Abdomen is soft.      Tenderness: There is no abdominal tenderness.     Musculoskeletal:      Cervical back: Normal range of motion.     Skin:     General: Skin is warm.      Coloration: Skin is jaundiced.     Neurological:      Mental Status: He is alert and oriented to person, place, and time.     Psychiatric:         Mood and Affect: Mood normal.         Behavior: Behavior normal.           Lab Results: I have reviewed the following results:CBC/BMP:   .     05/29/25  0459   WBC 9.70   HGB 7.2*   HCT 20.9*      SODIUM 135   K 5.6*   *   CO2 21   BUN 21   CREATININE 2.41*   GLUC 143*   PHOS 2.4*    , Creatinine Clearance: Estimated Creatinine Clearance: 30.4 mL/min (A) (by C-G formula based on SCr of 2.41 mg/dL (H))., LFTs:   .     05/29/25 0459   AST 33   ALT 22   ALB 3.7   TBILI 7.91*   ALKPHOS 422*    , PTT/INR:No new results in last 24 hours.

## 2025-05-29 NOTE — ASSESSMENT & PLAN NOTE
Lab Results   Component Value Date    EGFR 29 05/29/2025    EGFR 27 05/28/2025    EGFR 28 05/27/2025    CREATININE 2.41 (H) 05/29/2025    CREATININE 2.53 (H) 05/28/2025    CREATININE 2.47 (H) 05/27/2025     Patient with elevated creatinine on admission which continues to worsen today.  Patient with no history of cirrhosis and therefore this would not be consistent with HRS.  Nephrology currently consulted and will defer management to nephrology and primary teams.

## 2025-05-29 NOTE — PLAN OF CARE
Problem: PHYSICAL THERAPY ADULT  Goal: Performs mobility at highest level of function for planned discharge setting.  See evaluation for individualized goals.  Description: Treatment/Interventions: Functional transfer training, LE strengthening/ROM, Elevations, Therapeutic exercise, Endurance training, Patient/family training, Bed mobility, Gait training, Spoke to nursing, OT  Equipment Recommended: Walker       See flowsheet documentation for full assessment, interventions and recommendations.  Note: Prognosis: Good  Problem List: Decreased strength, Decreased endurance, Impaired balance, Decreased mobility, Decreased cognition, Impaired judgement, Decreased safety awareness, Pain  Assessment: Pt. 55 y.o.male admitted for Toxic metabolic encephalopathy w/ Alcohol abuse, acute renal failure, acute liver failure, electrolyte imbalance & elevated LFTs. Pt referred to PT for mobility assessment & D/C planning. Please see above for information re: home set-up & PLOF as well as objective findings during PT assessment. On eval, pt functioning below baseline hence will continue skilled PT to improve function & safety. Pt require modified I w/ bed mobility; S for transfers & amb w/ RW; minAx1 for stair management + cues for techniques & safety. Gait deviations as above but no gross LOB noted.  Pt c/p LBP & fatigue during amb & stair management hence require seated rest after ascending stairs. The patient's AM-PAC Basic Mobility Inpatient Short Form Raw Score is 19. A Raw score of greater than 16 suggests the patient may benefit from discharge to home. Please also refer to the recommendation of the Physical Therapist for safe discharge planning. From PT standpoint, due to above mentioned deficits, high risk for falls, impaired cognition & home alone, will recommend Level II (moderate resource intensity) rehab services vs Level III (minimum resource intensity) rehab services at D/C, pending progress. No SOB & dizziness  "reported t/o session. Nsg staff most recent vital signs as follows: /82   Pulse 98   Temp 99.1 °F (37.3 °C) (Oral)   Resp 18   Ht 5' 9\" (1.753 m)   Wt 62 kg (136 lb 11 oz)   SpO2 98%   BMI 20.18 kg/m² . At end of session, pt OOB in chair in stable condition, call bell & phone in reach, chair alarm activated. Fall precautions reinforced w/ good understanding. CM to follow. Nsg staff to continue to mobilized pt (OOB in chair for all meals & ambulate in room/unit) as tolerated to prevent further decline in function. Will also recommend Restorative for daily amb &/or daily OOB in chair as appropriate. Nsg notified. Co-eval was necessary to complete this PT eval for the pt's best interest given pt's medical acuity & complexity.  Barriers to Discharge: Inaccessible home environment, Decreased caregiver support    Barriers to Discharge Comments: home alone; (+)stairs    Rehab Resource Intensity Level, PT: II (Moderate Resource Intensity) (II vs III pending progress)    See flowsheet documentation for full assessment.        "

## 2025-05-29 NOTE — ASSESSMENT & PLAN NOTE
No evidence of blood loss.  Transfuse 1 unit PRBC yesterday  EGD and colonoscopy February with dieulafoy lesion but no other sources of bleeding    Results from last 7 days   Lab Units 05/29/25  0459 05/28/25  0508 05/27/25  0430 05/26/25  0541 05/25/25  0450 05/24/25  0441 05/23/25  1855   HEMOGLOBIN g/dL 7.2* 6.5* 7.2* 8.3* 9.5* 8.6* 9.7*

## 2025-05-29 NOTE — ASSESSMENT & PLAN NOTE
Lab Results   Component Value Date    HGBA1C 9.6 (H) 05/26/2025       Recent Labs     05/28/25  1106 05/28/25  1618 05/28/25  2108 05/29/25  0746   POCGLU 168* 183* 103 125       Blood Sugar Average: Last 72 hrs:  (P) [id number]  On insulin  Recommend tight glycemic control

## 2025-05-29 NOTE — PROGRESS NOTES
Progress Note - Hospitalist   Name: Wes Araujo 55 y.o. male I MRN: [id number]  Unit/Bed#: [place] [address] -01 I Date of Admission: 5/23/2025   Date of Service: 5/29/2025 I Hospital Day: 6    Assessment & Plan  Toxic metabolic encephalopathy  History of atrial fibrillation hypertension alcohol liver disease who presented to the hospital for change in mental status found to have hyperglycemia  Admitted to ICU and stabilized  GI consulted for liver injury/failure.  Father reports active consumption of alcohol  Mentation much improved and near baseline  Acute renal failure superimposed on stage 3 chronic kidney disease (HCC)  Acute kidney injury on CKD 3 baseline creatinine approximately 1.5-2.0  Related to progressive liver dysfunction and dehydration  Nephrology following.  Currently getting albumin challenge.    Results from last 7 days   Lab Units 05/29/25  0459 05/28/25  0508 05/27/25  0430 05/26/25  0541 05/24/25  1639 05/24/25  0613 05/24/25  0441 05/23/25  2155 05/23/25  1855   BUN mg/dL 21 22 21 16 12 12 12 13 12   CREATININE mg/dL 2.41* 2.53* 2.47* 2.23* 1.72* 1.78* 1.72* 1.97* 1.99*   EGFR ml/min/1.73sq m 29 27 28 31 43 42 43 37 36     Acute liver failure  Evidence of worsening hyperbilirubinemia and coagulopathy  Being followed by gastroenterology.  Discriminant function does not meet criteria to initiate steroids for possible alcoholic hepatitis  Ruling out infection: Cultures no growth    Results from last 7 days   Lab Units 05/29/25  0459 05/28/25  0508 05/27/25  0430 05/26/25  0541 05/25/25  0450 05/24/25  0441 05/23/25  1855   AST U/L 33 43* 49* 51* 60* 43* 48*   ALT U/L 22 27 33 34 37 42 51   TOTAL BILIRUBIN mg/dL 7.91* 7.26* 7.14* 3.61* 3.17* 2.93* 4.18*     Results from last 7 days   Lab Units 05/28/25  0508 05/27/25  0430 05/26/25  0541 05/25/25  0450   INR  1.06 1.11 5.53* 4.09*     Other specified anemias  No evidence of blood loss.  Transfuse 1 unit PRBC yesterday  EGD and colonoscopy February  with dieulafoy lesion but no other sources of bleeding    Results from last 7 days   Lab Units 05/29/25  0459 05/28/25  0508 05/27/25  0430 05/26/25  0541 05/25/25  0450 05/24/25  0441 05/23/25  1855   HEMOGLOBIN g/dL 7.2* 6.5* 7.2* 8.3* 9.5* 8.6* 9.7*     Primary hypertension  Continue amlodipine 5 mg twice daily, hydralazine 25 mg 3 times daily  Type 2 diabetes mellitus with hyperglycemia, with long-term current use of insulin ([facility])  Lab Results   Component Value Date    HGBA1C 9.6 (H) 05/26/2025     Recent Labs     05/28/25  1618 05/28/25  2108 05/29/25  0746 05/29/25  1111   POCGLU 183* 103 125 142*     HHNK upon arrival likely related to noncompliance with medications and alcohol use  Restarted on 70/30 5 units twice daily  Continue sliding scale  Alcohol abuse  History of alcohol use with withdrawal and withdrawal seizures  Father reported active consumption of alcohol  Continue thiamine and folic acid  History of atrial fibrillation  History of atrial fibrillation on metoprolol  Not on anticoagulation at baseline  Electrolyte abnormality  Hyponatremia secondary to hyperglycemia and progressive liver disease    Results from last 7 days   Lab Units 05/29/25  0459 05/28/25  0508 05/27/25  0430 05/26/25  0541   SODIUM mmol/L 135 134* 132* 133*       VTE Pharmacologic Prophylaxis:   Moderate Risk (Score 3-4) - Pharmacological DVT Prophylaxis Contraindicated. Sequential Compression Devices Ordered.    Mobility:   Basic Mobility Inpatient Raw Score: 19  JH-HLM Goal: 6: Walk 10 steps or more  JH-HLM Achieved: 7: Walk 25 feet or more  JH-HLM Goal achieved. Continue to encourage appropriate mobility.    Patient Centered Rounds: I have performed bedside rounds with nursing staff today.  Discussions with Specialists or Other Care Team Provider: case management, gastroenterology    Education and Discussions with Family / Patient:     Current Length of Stay: 6 day(s)  Current Patient Status: Inpatient   Certification  Statement: The patient will continue to require additional inpatient hospital stay due to acute alcoholic hepatitis  Discharge Plan: Anticipate discharge in 24-48 hrs to home.    Code Status: Level 1 - Full Code    Subjective   Patient seen and examined. No new complaints, feeling ok.    Objective   Vitals:   Temp (24hrs), Av.7 °F (37.1 °C), Min:98.2 °F (36.8 °C), Max:99.1 °F (37.3 °C)    Temp:  [98.2 °F (36.8 °C)-99.1 °F (37.3 °C)] 99.1 °F (37.3 °C)  HR:  [83-98] 91  Resp:  [18-20] 18  BP: (151-176)/(74-84) 168/84  SpO2:  [97 %-99 %] 97 %  Body mass index is 20.18 kg/m².     Input and Output Summary (last 24 hours):     Intake/Output Summary (Last 24 hours) at 2025 1337  Last data filed at 2025 1334  Gross per 24 hour   Intake 1320 ml   Output 450 ml   Net 870 ml       Physical Exam  Vitals reviewed.   Constitutional:       General: He is not in acute distress.  HENT:      Head: Atraumatic.     Eyes:      Extraocular Movements: Extraocular movements intact.       Cardiovascular:      Rate and Rhythm: Regular rhythm.   Pulmonary:      Effort: Pulmonary effort is normal.      Breath sounds: Decreased breath sounds present. No wheezing.   Abdominal:      General: Bowel sounds are normal.      Palpations: Abdomen is soft.      Tenderness: There is no abdominal tenderness.     Musculoskeletal:         General: No swelling.     Skin:     General: Skin is warm and dry.     Neurological:      General: No focal deficit present.      Mental Status: He is alert.      Motor: No weakness.     Psychiatric:         Mood and Affect: Mood normal.       Lines/Drains:  Invasive Devices       Peripheral Intravenous Line  Duration             Peripheral IV 25 Dorsal (posterior);Left Hand 1 day                        Lab Results: I have reviewed the following results:   Results from last 7 days   Lab Units 25  0459 25  0508 25  0430 25  0541 25  0450   WBC Thousand/uL 9.70 9.29 11.67*  12.21* 19.15*   HEMOGLOBIN g/dL 7.2* 6.5* 7.2* 8.3* 9.5*   PLATELETS Thousands/uL 219 243 261 279 306   MCV fL 91 93 94 91 88   TOTAL NEUT ABS Thousand/uL  --   --   --   --  15.70*   INR   --  1.06 1.11 5.53* 4.09*     Results from last 7 days   Lab Units 05/29/25  0459 05/28/25  0508 05/27/25  0430   SODIUM mmol/L 135 134* 132*   POTASSIUM mmol/L 5.6* 5.1 5.0   CHLORIDE mmol/L 109* 109* 105   CO2 mmol/L 21 21 21   ANION GAP mmol/L 5 4 6   BUN mg/dL 21 22 21   CREATININE mg/dL 2.41* 2.53* 2.47*   CALCIUM mg/dL 9.0 8.5 8.1*   ALBUMIN g/dL 3.7 3.0* 2.5*   TOTAL BILIRUBIN mg/dL 7.91* 7.26* 7.14*   ALK PHOS U/L 422* 529* 646*   ALT U/L 22 27 33   AST U/L 33 43* 49*   EGFR ml/min/1.73sq m 29 27 28   GLUCOSE RANDOM mg/dL 143* 172* 216*     Results from last 7 days   Lab Units 05/29/25  0459 05/28/25  0508 05/26/25  0541 05/25/25  0450 05/24/25  0613 05/24/25  0441   MAGNESIUM mg/dL  --  2.3 2.5 2.6 1.8* 1.8*   PHOSPHORUS mg/dL 2.4* 2.8 4.6* 4.7* 2.5* 9.3*     Results from last 7 days   Lab Units 05/23/25  1855   CK TOTAL U/L 62     Results from last 7 days   Lab Units 05/24/25  0030 05/23/25  2155 05/23/25  1855   HS TNI 0HR ng/L  --   --  13   HS TNI 2HR ng/L  --  14  --    HS TNI 4HR ng/L 16  --   --           Results from last 7 days   Lab Units 05/24/25  0441   PROCALCITONIN ng/ml 1.03*     Results from last 7 days   Lab Units 05/29/25  1111 05/29/25  0746 05/28/25  2108 05/28/25  1618 05/28/25  1106 05/28/25  0755 05/27/25  2105 05/27/25  1618 05/27/25  1059 05/27/25  0729 05/26/25  2118 05/26/25  1726   POC GLUCOSE mg/dl 142* 125 103 183* 168* 139 153* 240* 150* 208* 178* 137     Results from last 7 days   Lab Units 05/26/25  1202   HEMOGLOBIN A1C % 9.6*           Recent Cultures (last 7 days):   Results from last 7 days   Lab Units 05/26/25  1742   BLOOD CULTURE  No Growth at 48 hrs.  No Growth at 48 hrs.       Imaging:  Reviewed radiology reports from this admission including:  XR chest 1 view  Result Date:  5/27/2025  Impression: Left lower lobe atelectasis versus infiltrate Workstation performed: [id number]     US right upper quadrant  Result Date: 5/23/2025  Impression: Limited exam. Heterogeneous appearance of the pancreas, correlates with fatty replacement and calcifications seen on prior CT, possibly sequela of chronic inflammation/chronic pancreatitis. Liver appears grossly unremarkable. Small volume hypoechoic ascites, and other findings as above. Workstation performed: [id number]     CT head without contrast  Result Date: 5/23/2025  Impression: No acute intracranial abnormality. Workstation performed: [id number]       Last 24 Hours Medication List:     Current Facility-Administered Medications:     albumin human (FLEXBUMIN) 25 % injection 25 g, Q6H    albumin human (FLEXBUMIN) 25 % injection 25 g, Q6H    amLODIPine (NORVASC) tablet 5 mg, BID    folic acid (FOLVITE) tablet 1 mg, Daily    hydrALAZINE (APRESOLINE) injection 10 mg, Q4H PRN    hydrALAZINE (APRESOLINE) tablet 25 mg, Q8H JAISON    insulin aspart protamine-insulin aspart (NovoLOG 70/30) 100 units/mL subcutaneous injection 5 Units, BID AC    insulin lispro (HumALOG/ADMELOG) 100 units/mL subcutaneous injection 1-5 Units, TID AC **AND** Fingerstick Glucose (POCT), TID AC    insulin lispro (HumALOG/ADMELOG) 100 units/mL subcutaneous injection 1-5 Units, HS    ondansetron (ZOFRAN) injection 4 mg, Q4H PRN    thiamine tablet 100 mg, Daily    Administrative Statements   Today, Patient Was Seen By: Bala Leroy, DO  I have spent a total time of 35 minutes in caring for this patient on the day of the visit/encounter including Counseling / Coordination of care, Documenting in the medical record, Reviewing/placing orders in the medical record (including tests, medications, and/or procedures), and Communicating with other healthcare professionals .    **Please Note: This note may have been constructed using a voice recognition system.**

## 2025-05-29 NOTE — ASSESSMENT & PLAN NOTE
Acute kidney injury on CKD 3 baseline creatinine approximately 1.5-2.0  Related to progressive liver dysfunction and dehydration  Nephrology following.  Currently getting albumin challenge.    Results from last 7 days   Lab Units 05/29/25  0459 05/28/25  0508 05/27/25  0430 05/26/25  0541 05/24/25  1639 05/24/25  0613 05/24/25  0441 05/23/25  2155 05/23/25  1855   BUN mg/dL 21 22 21 16 12 12 12 13 12   CREATININE mg/dL 2.41* 2.53* 2.47* 2.23* 1.72* 1.78* 1.72* 1.97* 1.99*   EGFR ml/min/1.73sq m 29 27 28 31 43 42 43 37 36

## 2025-05-29 NOTE — PLAN OF CARE
Problem: OCCUPATIONAL THERAPY ADULT  Goal: Performs self-care activities at highest level of function for planned discharge setting.  See evaluation for individualized goals.  Description: Treatment Interventions: ADL retraining, Functional transfer training, UE strengthening/ROM, Endurance training, Cognitive reorientation, Patient/family training, Equipment evaluation/education, Compensatory technique education, Continued evaluation          See flowsheet documentation for full assessment, interventions and recommendations.   Note: Limitation: Decreased ADL status, Decreased UE strength, Decreased Safe judgement during ADL, Decreased cognition, Decreased endurance, Decreased high-level ADLs  Prognosis: Fair  Assessment: Pt seen for 31min tx session with focus on functinal balance, functional mobility, ADL status, transfer safety, b/l UE ROM, and cognition. Pt able to tolerate OOB mobility; sitting balance=f+/f, standing balance=f-/p+. Pt required verbal cues/physical assistance to maintain transfer/walker safety. Pt demonstrating need for assistance with his LE ADLs. Pt fatigues quickly with functional mobility tasks. Cognitive deficits noted--i.e.orientation, memory, problem solving, judgement/safety. Pt pleasant and cooperative with tx session. The patient's raw score on the -PAC Daily Activity Inpatient Short Form is 20. A raw score of greater than or equal to 19 suggests the patient may benefit from discharge to home. Please refer to the recommendation of the Occupational Therapist for safe discharge planning.     Rehab Resource Intensity Level, OT: II (Moderate Resource Intensity)

## 2025-05-29 NOTE — ASSESSMENT & PLAN NOTE
Most recent potassium 5.6  Placed on low potassium diet  Give insulin dextrose Bumex 2 mg IV x 1  Check BMP in a.m.

## 2025-05-29 NOTE — PHYSICAL THERAPY NOTE
PT EVALUATION    Pt. Name: Wes Araujo  Pt. Age: 55 y.o.  MRN: [id number]  LENGTH OF STAY: 6      Admitting Diagnoses:   Alcohol abuse [F10.10]  DKA (diabetic ketoacidosis) (HCC) [E11.10]  Transaminitis [R74.01]  Hyperglycemia [R73.9]  Uncontrolled hypertension [I10]  Acute encephalopathy [G93.40]    Past Medical History[1]    Past Surgical History[2]    Imaging Studies:  XR chest 1 view   Final Result by Miguel Angel Regalado MD (05/27 1617)      Left lower lobe atelectasis versus infiltrate            Workstation performed: [id number]         US right upper quadrant   Final Result by Solomon Spears DO (05/23 0819)      Limited exam.      Heterogeneous appearance of the pancreas, correlates with fatty replacement and calcifications seen on prior CT, possibly sequela of chronic inflammation/chronic pancreatitis.      Liver appears grossly unremarkable.      Small volume hypoechoic ascites, and other findings as above.      Workstation performed: [id number]         CT head without contrast   Final Result by Eleuterio Campa MD (05/23 2103)      No acute intracranial abnormality.                  Workstation performed: [id number]               05/29/25 0927   PT Last Visit   PT Visit Date 05/29/25   Note Type   Note type Evaluation   Pain Assessment   Pain Score 9   Pain Location/Orientation Orientation: Lower;Location: Back  (during mobility)   Restrictions/Precautions   Weight Bearing Precautions Per Order No   Other Precautions Cognitive;Chair Alarm;Bed Alarm;Fall Risk;Pain   Home Living   Type of Home Apartment  ([address] [place] [address] apartment)   Home Layout One level;Stairs to enter with rails;Stairs to enter without rails  (10STE w/o HR)   Bathroom Shower/Tub Tub/shower unit   Bathroom Toilet Standard   Home Equipment Other (Comment)  (pt denies)   Additional Comments above info obtained from OT eval   Prior Function   Level of [place] Needs assistance with ADLs;Needs assistance with functional  mobility;Needs assistance with IADLS   Lives With Alone   Falls in the last 6 months 1 to 4  (at least 1 known, leading to this admission)   Comments pt limited historian; pt reports he needs assistance at baseline however lives alone   General   Family/Caregiver Present No   Cognition   Overall Cognitive Status Impaired   Arousal/Participation Alert   Attention Attends with cues to redirect   Orientation Level Oriented to person;Oriented to place;Disoriented to time;Disoriented to situation  (oriented to general place)   Following Commands Follows one step commands with increased time or repetition   Comments pleasant & cooperative; pt Vietnamese speaking w/ [place]   Subjective   Subjective Pt agreeable to PT eval.   RUE Assessment   RUE Assessment WFL  (4-/5 grossly)   LUE Assessment   LUE Assessment WFL  (4-/5 grossly)   RLE Assessment   RLE Assessment WFL  (4-/5 grossly except hip 3+/5)   LLE Assessment   LLE Assessment WFL  (4-/5 grossly except hip 3+/5)   Bed Mobility   Supine to Sit 6  Modified independent   Additional items HOB elevated;Bedrails;Increased time required   Transfers   Sit to Stand 5  Supervision   Additional items Increased time required;Verbal cues;Bedrails   Stand to Sit 5  Supervision   Additional items Armrests;Increased time required;Verbal cues   Stand pivot 5  Supervision   Additional items Armrests;Increased time required;Verbal cues   Additional Comments w/ RW; cues for techniques & safety   Ambulation/Elevation   Gait pattern Wide ELLIOT;Decreased foot clearance;Short stride;Step to;Excessively slow   Gait Assistance 5  Supervision   Additional items Verbal cues   Assistive Device Rolling walker   Distance ~110'x1 & 40'x1   Stair Management Assistance 4  Minimal assist   Additional items Assist x 1;Verbal cues;Tactile cues;Increased time required   Stair Management Technique Two rails;Alternating pattern;Step to pattern;Foreward  (alternating, ascending; step-to, descending)   Number of  Stairs 9   Ambulation/Elevation Additional Comments unsteady gait but no gross LOB noted; require cues for stay within RW ELLIOT especially during turns; (+) fatigue & LBP reported during stair training hence require seated rest prior to descending stairs & ambulating back to room   Balance   Static Sitting Good   Dynamic Sitting Fair +   Static Standing Fair  (w/ RW)   Dynamic Standing Fair -  (w/ RW)   Ambulatory Fair -  (w/ RW)   Endurance Deficit   Endurance Deficit Yes   Endurance Deficit Description fatigue; LBP   Activity Tolerance   Activity Tolerance Patient limited by fatigue;Patient limited by pain;Treatment limited secondary to medical complications (Comment)   Medical Staff Made Aware OTR [place]   Nurse Made Aware yes, [place]   Assessment   Prognosis Good   Problem List Decreased strength;Decreased endurance;Impaired balance;Decreased mobility;Decreased cognition;Impaired judgement;Decreased safety awareness;Pain   Assessment Pt. 55 y.o.male admitted for Toxic metabolic encephalopathy w/ Alcohol abuse, acute renal failure, acute liver failure, electrolyte imbalance & elevated LFTs. Pt referred to PT for mobility assessment & D/C planning. Please see above for information re: home set-up & PLOF as well as objective findings during PT assessment. On eval, pt functioning below baseline hence will continue skilled PT to improve function & safety. Pt require modified I w/ bed mobility; S for transfers & amb w/ RW; minAx1 for stair management + cues for techniques & safety. Gait deviations as above but no gross LOB noted.  Pt c/p LBP & fatigue during amb & stair management hence require seated rest after ascending stairs. The patient's AM-PAC Basic Mobility Inpatient Short Form Raw Score is 19. A Raw score of greater than 16 suggests the patient may benefit from discharge to home. Please also refer to the recommendation of the Physical Therapist for safe discharge planning. From PT standpoint, due to above  "mentioned deficits, high risk for falls, impaired cognition & home alone, will recommend Level II (moderate resource intensity) rehab services vs Level III (minimum resource intensity) rehab services at D/C, pending progress. No SOB & dizziness reported t/o session. Nsg staff most recent vital signs as follows: /82   Pulse 98   Temp 99.1 °F (37.3 °C) (Oral)   Resp 18   Ht 5' 9\" (1.753 m)   Wt 62 kg (136 lb 11 oz)   SpO2 98%   BMI 20.18 kg/m² . At end of session, pt OOB in chair in stable condition, call bell & phone in reach, chair alarm activated. Fall precautions reinforced w/ good understanding. CM to follow. Nsg staff to continue to mobilized pt (OOB in chair for all meals & ambulate in room/unit) as tolerated to prevent further decline in function. Will also recommend Restorative for daily amb &/or daily OOB in chair as appropriate. Nsg notified. Co-eval was necessary to complete this PT eval for the pt's best interest given pt's medical acuity & complexity.   Barriers to Discharge Inaccessible home environment;Decreased caregiver support   Barriers to Discharge Comments home alone; (+)stairs   Goals   Patient Goals to have less pain   STG Expiration Date 06/12/25   Short Term Goal #1 Goals to be met in 14 days; pt will be able to: 1) inc strength & balance by 1/2 grade to improve overall functional mobility & dec fall risk; 2) inc bed mobility to I for pt to be able to get in/OOB safely w/ proper techniques 100% of the time, to dec caregiver burden & safely function at home; 3) inc transfers to modified I for pt to transition safely from one surface to another w/o % of the time, to dec caregiver burden & safely function at home; 4) inc amb w/ RW approx. >350' w/ modified I for pt to ambulate community distances w/o any % of the time, to dec caregiver burden & safely function at home; 5) negotiate stairs w/ S for inc safety during stair mgt inside/outside of home & dec caregiver " burden; 6) pt/caregiver ed   PT Treatment Day 0   Plan   Treatment/Interventions Functional transfer training;LE strengthening/ROM;Elevations;Therapeutic exercise;Endurance training;Patient/family training;Bed mobility;Gait training;Spoke to nursing;OT   PT Frequency 3-5x/wk   Discharge Recommendation   Rehab Resource Intensity Level, PT II (Moderate Resource Intensity)  (II vs III pending progress)   Equipment Recommended Walker   Walker Package Recommended Wheeled walker   Additional Comments restorative for daily amb   AM-PAC Basic Mobility Inpatient   Turning in Flat Bed Without Bedrails 4   Lying on Back to Sitting on Edge of Flat Bed Without Bedrails 4   Moving Bed to Chair 3   Standing Up From Chair Using Arms 3   Walk in Room 3   Climb 3-5 Stairs With Railing 2   Basic Mobility Inpatient Raw Score 19   Basic Mobility Standardized Score 42.48   [facility] Highest Level Of Mobility   -HLM Goal 6: Walk 10 steps or more   -HLM Achieved 7: Walk 25 feet or more   End of Consult   Patient Position at End of Consult Bedside chair;Bed/Chair alarm activated;All needs within reach   End of Consult Comments Pt in stable condition. All needs in reach. Chair alarm activated.   Hx/personal factors: co-morbidities, inaccessible home, home alone, use of AD, dec cognition, pain, fall risk, and assist w/ ADL's  Examination: dec mobility, dec balance, dec endurance, dec amb, risk for falls, pain, dec cognition  Clinical: unpredictable (ongoing medical status, abnormal lab values, risk for falls, and pain mgt)  Complexity: high    Michele Kyleigh               [1]   Past Medical History:  Diagnosis Date    Alcohol abuse     Chronic pancreatitis (HCC)     Diabetes mellitus (HCC)     Type 2    Non compliance w medication regimen     Pancreatitis     Paroxysmal A-fib (HCC)     Thrombocytopenia (HCC)    [2]   Past Surgical History:  Procedure Laterality Date    INCISION AND DRAINAGE OF WOUND N/A 8/16/2020    Procedure: INCISION AND  DRAINAGE (I&D) HEAD/FACE;  Surgeon: Estrada Walton DMD;  Location: [facility];  Service: Maxillofacial    IR BIOPSY BONE MARROW  2/7/2019    REMOVAL OF IMPACTED TOOTH - COMPLETELY BONY N/A 8/16/2020    Procedure: EXTRACTION TEETH MULTIPLE #2, 3;  Surgeon: Estrada Walton DMD;  Location: [facility];  Service: Maxillofacial

## 2025-05-29 NOTE — ASSESSMENT & PLAN NOTE
History of alcohol use with withdrawal and withdrawal seizures  Father reported active consumption of alcohol  Continue thiamine and folic acid

## 2025-05-29 NOTE — ASSESSMENT & PLAN NOTE
Hyponatremia secondary to hyperglycemia and progressive liver disease    Results from last 7 days   Lab Units 05/29/25  0459 05/28/25  0508 05/27/25  0430 05/26/25  0541   SODIUM mmol/L 135 134* 132* 133*

## 2025-05-29 NOTE — ASSESSMENT & PLAN NOTE
Lab Results   Component Value Date    HGBA1C 9.6 (H) 05/26/2025     Recent Labs     05/28/25  1618 05/28/25  2108 05/29/25  0746 05/29/25  1111   POCGLU 183* 103 125 142*     HHNK upon arrival likely related to noncompliance with medications and alcohol use  Restarted on 70/30 5 units twice daily  Continue sliding scale

## 2025-05-29 NOTE — ASSESSMENT & PLAN NOTE
ZENAIDA most likely secondary to prerenal azotemia secondary to osmotic diuresis plus some component of intravascular volume depletion in light of hypoalbuminemia plus cholemic nephrosis with subsequent ATN versus HRS in the presence of acute liver injury  After review of records it appears that the patient has a baseline Creatinine of 1.5-2.0 mg/dL.  Admission creatinine of 1.99 mg/dL on 5/23.  Creatinine today is at 2.41 mg/dL appears to be plateauing minimally improved.  check BMP, magnesium, phosphorus in a.m.  Continue albumin 25 g IV Q6 for now  We will give Bumex 2 mg IV x 1 to help with potassium excretion  Discussed with patient we will have to wait a period of 3 months to see where new baseline creatinine would be  Await renal recovery.  Place on a renal diet when allowed diet order.   CT abdomen with contrast/26/25 no hydronephrosis unremarkable kidneys  Strict I/O.  Daily weights  Urinary retention protocol  Avoid nephrotoxins, adjust meds to appropriate GFR.  Likely has underlying CKD secondary to poorly controlled diabetes plus hypertensive nephrosclerosis plus age-related nephron loss  Outpatient for nephrology follows up with nurse practitioner Tanika last seen 2019

## 2025-05-29 NOTE — ASSESSMENT & PLAN NOTE
most recent hemoglobin at 7.2 g/dL status post PRBC transfusion yesterday  Maintain hemoglobin greater than 8 grams/deciliter  Follow-up with primary team  Monitor closely CBC for further

## 2025-05-29 NOTE — ASSESSMENT & PLAN NOTE
Optimize hemodynamic status to avoid renal ischemic injury.  Maintain MAP > 65mmHg  Avoid BP fluctuations.  Home medications: Hydralazine 25 mg p.o. every 8, Norvasc 5 mg p.o. twice daily  Current medications: Norvasc 5 mg p.o. twice daily, hydralazine 25 mg p.o. every 8  Recommend completing albumin 25 g IV every 6 and will give Bumex 2 mg IV x 1 to help with potassium excretion

## 2025-05-29 NOTE — ASSESSMENT & PLAN NOTE
Evidence of worsening hyperbilirubinemia and coagulopathy  Being followed by gastroenterology.  Discriminant function does not meet criteria to initiate steroids for possible alcoholic hepatitis  Ruling out infection: Cultures no growth    Results from last 7 days   Lab Units 05/29/25  0459 05/28/25  0508 05/27/25  0430 05/26/25  0541 05/25/25  0450 05/24/25  0441 05/23/25  1855   AST U/L 33 43* 49* 51* 60* 43* 48*   ALT U/L 22 27 33 34 37 42 51   TOTAL BILIRUBIN mg/dL 7.91* 7.26* 7.14* 3.61* 3.17* 2.93* 4.18*     Results from last 7 days   Lab Units 05/28/25  0508 05/27/25  0430 05/26/25  0541 05/25/25  0450   INR  1.06 1.11 5.53* 4.09*

## 2025-05-29 NOTE — PROGRESS NOTES
Follow up Consultation    Nephrology   Wes Araujo 55 y.o. male MRN: [id number]  Unit/Bed#: [place] [address] -[id number] Encounter: [id number]      Physician Requesting Consult: Bala Leroy DO  55 y.o. male with pmh of multiple comorbidities including alcohol use disorder, chronic pancreatitis, diabetes, A-fib, hypertension and noncompliance initially presented with changes in mental status and agitation. During the course of the hospital stay was treated for HHNK and alcohol withdrawal. Nephrology has been consulted on 5/27/2025 for evaluation and management of abnormal renal parameters.   Assessment & Plan  Acute kidney injury (HCC)  ZENAIDA most likely secondary to prerenal azotemia secondary to osmotic diuresis plus some component of intravascular volume depletion in light of hypoalbuminemia plus cholemic nephrosis with subsequent ATN versus HRS in the presence of acute liver injury  After review of records it appears that the patient has a baseline Creatinine of 1.5-2.0 mg/dL.  Admission creatinine of 1.99 mg/dL on 5/23.  Creatinine today is at 2.41 mg/dL appears to be plateauing minimally improved.  check BMP, magnesium, phosphorus in a.m.  Continue albumin 25 g IV Q6 for now  We will give Bumex 2 mg IV x 1 to help with potassium excretion  Discussed with patient we will have to wait a period of 3 months to see where new baseline creatinine would be  Await renal recovery.  Place on a renal diet when allowed diet order.   CT abdomen with contrast/26/25 no hydronephrosis unremarkable kidneys  Strict I/O.  Daily weights  Urinary retention protocol  Avoid nephrotoxins, adjust meds to appropriate GFR.  Likely has underlying CKD secondary to poorly controlled diabetes plus hypertensive nephrosclerosis plus age-related nephron loss  Outpatient for nephrology follows up with nurse practitioner Tanika last seen 2019  CKD stage 3b, GFR 30-44 ml/min ([facility])  Lab Results   Component Value Date    EGFR 29 05/29/2025    EGFR 27  05/28/2025    EGFR 28 05/27/2025    CREATININE 2.41 (H) 05/29/2025    CREATININE 2.53 (H) 05/28/2025    CREATININE 2.47 (H) 05/27/2025   After review of records it appears that the patient has a baseline Creatinine of 1.5-2.0 mg/dL.  Avoid nephrotoxins, adjust meds to appropriate GFR.  Likely has underlying CKD secondary to poorly controlled diabetes plus hypertensive nephrosclerosis plus age-related nephron loss  Outpatient for nephrology follows up with nurse practitioner Tanika last seen 2019    Hyponatremia  Most recent serum sodium 135 mill equivalents stable improving   Check BMP in a.m.  Primary hypertension  Optimize hemodynamic status to avoid renal ischemic injury.  Maintain MAP > 65mmHg  Avoid BP fluctuations.  Home medications: Hydralazine 25 mg p.o. every 8, Norvasc 5 mg p.o. twice daily  Current medications: Norvasc 5 mg p.o. twice daily, hydralazine 25 mg p.o. every 8  Recommend completing albumin 25 g IV every 6 and will give Bumex 2 mg IV x 1 to help with potassium excretion  Type 2 diabetes mellitus with hyperglycemia, with long-term current use of insulin ([facility])  Lab Results   Component Value Date    HGBA1C 9.6 (H) 05/26/2025       Recent Labs     05/28/25  1106 05/28/25  1618 05/28/25  2108 05/29/25  0746   POCGLU 168* 183* 103 125       Blood Sugar Average: Last 72 hrs:  (P) [id number]  On insulin  Recommend tight glycemic control  Alcohol abuse  Monitor for withdrawal  History of atrial fibrillation  Follow-up with cardiology  Acute liver failure  Follow-up with GI  Rising LFTs and bilirubin levels  Other specified anemias  most recent hemoglobin at 7.2 g/dL status post PRBC transfusion yesterday  Maintain hemoglobin greater than 8 grams/deciliter  Follow-up with primary team  Monitor closely CBC for further  Elevated LFTs  Follow-up with GI  Hyperkalemia  Most recent potassium 5.6  Placed on low potassium diet  Give insulin dextrose Bumex 2 mg IV x 1  Check BMP in a.m.        Thanks  "for the consult  Will continue to follow  Please call with questions/ concerns.  Above-mentioned orders and Plan in terms of give insulin dextrose Bumex 2 mg IV x 1 continue albumin were discussed with the team in depth and they agree.  Please secure chat the Nephrologist on call for this campus if you have any Nephrological questions or concerns.    Bibi Solis MD, FASN, 2025, 10:59 AM              Objective :   Seen and examined in his room overnight events hemoglobin stable remains afebrile urine output 400 cc plus/24 hours status post PRBC yesterday      PHYSICAL EXAM  /82   Pulse 98   Temp 99.1 °F (37.3 °C) (Oral)   Resp 18   Ht 5' 9\" (1.753 m)   Wt 62 kg (136 lb 11 oz)   SpO2 98%   BMI 20.18 kg/m²   Temp (24hrs), Av.6 °F (37 °C), Min:98.2 °F (36.8 °C), Max:99.1 °F (37.3 °C)        Intake/Output Summary (Last 24 hours) at 2025 1059  Last data filed at 2025 0902  Gross per 24 hour   Intake 1680 ml   Output 400 ml   Net 1280 ml       I/O last 24 hours:  In: 1920 [P.O.:1640; Blood:280]  Out: 400 [Urine:400]      Current Weight: Weight - Scale: 62 kg (136 lb 11 oz)  First Weight: Weight - Scale: 55.4 kg (122 lb 2.2 oz)  Physical Exam  Vitals and nursing note reviewed.   Constitutional:       General: He is not in acute distress.     Appearance: Normal appearance. He is normal weight. He is not toxic-appearing or diaphoretic.   HENT:      Head: Normocephalic and atraumatic.      Mouth/Throat:      Pharynx: No oropharyngeal exudate.     Eyes:      General: No scleral icterus.      Cardiovascular:      Rate and Rhythm: Normal rate.   Pulmonary:      Effort: No respiratory distress.      Breath sounds: No wheezing.   Abdominal:      Tenderness: There is abdominal tenderness.     Musculoskeletal:         General: No swelling.      Cervical back: No rigidity.     Skin:     Coloration: Skin is not jaundiced.     Neurological:      General: No focal deficit present.      Mental Status: " He is alert and oriented to person, place, and time.     Psychiatric:         Mood and Affect: Mood normal.         Behavior: Behavior normal.             Review of Systems   Constitutional:  Negative for chills and fever.   HENT:  Negative for congestion.    Respiratory:  Negative for cough and shortness of breath.    Cardiovascular:  Negative for leg swelling.   Gastrointestinal:  Positive for abdominal pain.   Musculoskeletal:  Negative for back pain.   Neurological:  Negative for headaches.   Psychiatric/Behavioral:  Negative for agitation and confusion.    All other systems reviewed and are negative.      Scheduled Meds:  Current Facility-Administered Medications   Medication Dose Route Frequency Provider Last Rate    Albumin 25%  25 g Intravenous Q6H Bibi Solis MD      amLODIPine  5 mg Oral BID Bibi Solis MD      folic acid  1 mg Oral Daily Bala Leroy DO      hydrALAZINE  10 mg Intravenous Q4H PRN Veronica Phyllis Petersen, CRNP      hydrALAZINE  25 mg Oral Q8H JAISON Veronica Phyllis Petersen, CRNP      insulin aspart protamine-insulin aspart  5 Units Subcutaneous BID AC Veronica Phyllis Petersen, CRNP      insulin lispro  1-5 Units Subcutaneous TID AC Veronica Phyllis Petersen, CRNP      insulin lispro  1-5 Units Subcutaneous HS Veronica Phyllis Petersen, CRNP      ondansetron  4 mg Intravenous Q4H PRN Bala Leroy,       thiamine  100 mg Oral Daily Bala Leroy DO         PRN Meds:.  hydrALAZINE    ondansetron    Continuous Infusions:       Invasive Devices:     Invasive Devices       Peripheral Intravenous Line  Duration             Peripheral IV 05/28/25 Dorsal (posterior);Left Hand 1 day                      LABORATORY:    Results from last 7 days   Lab Units 05/29/25  0459 05/28/25  0508 05/27/25  0430 05/26/25  0541 05/25/25  0450 05/24/25  1639 05/24/25  0613 05/24/25  0441 05/23/25  2155 05/23/25  1855   WBC Thousand/uL 9.70 9.29 11.67* 12.21* 19.15*  --   --  20.48*  --  11.35*   HEMOGLOBIN g/dL 7.2* 6.5* 7.2* 8.3* 9.5*  --   --   "8.6*  --  9.7*   HEMATOCRIT % 20.9* 19.5* 21.8* 25.0* 27.4*  --   --  25.5*  --  28.9*   PLATELETS Thousands/uL 219 243 261 279 306  --   --  278  --  220   POTASSIUM mmol/L 5.6* 5.1 5.0 5.3  --  4.9 3.8 6.6* 3.4* 3.8   CHLORIDE mmol/L 109* 109* 105 104  --  101 104 102 97 92*   CO2 mmol/L 21 21 21 24  --  22 22 20* 23 23   BUN mg/dL 21 22 21 16  --  12 12 12 13 12   CREATININE mg/dL 2.41* 2.53* 2.47* 2.23*  --  1.72* 1.78* 1.72* 1.97* 1.99*   CALCIUM mg/dL 9.0 8.5 8.1* 8.3*  --  8.4 8.6 8.5 8.2* 8.6   MAGNESIUM mg/dL  --  2.3  --  2.5 2.6  --  1.8* 1.8* 1.9  --    PHOSPHORUS mg/dL 2.4* 2.8  --  4.6* 4.7*  --  2.5* 9.3* 1.9*  --       rest all reviewed    RADIOLOGY:  XR chest 1 view   Final Result by Miguel Angel Regalado MD (05/27 1617)      Left lower lobe atelectasis versus infiltrate            Workstation performed: [id number]         US right upper quadrant   Final Result by Solomon Spears DO (05/23 2314)      Limited exam.      Heterogeneous appearance of the pancreas, correlates with fatty replacement and calcifications seen on prior CT, possibly sequela of chronic inflammation/chronic pancreatitis.      Liver appears grossly unremarkable.      Small volume hypoechoic ascites, and other findings as above.      Workstation performed: [id number]         CT head without contrast   Final Result by Eleuterio Campa MD (05/23 2103)      No acute intracranial abnormality.                  Workstation performed: [id number]           Rest all reviewed    Portions of the record may have been created with voice recognition software. Occasional wrong word or \"sound a like\" substitutions may have occurred due to the inherent limitations of voice recognition software. Read the chart carefully and recognize, using context, where substitutions have occurred.If you have any questions, please contact the dictating provider.  "

## 2025-05-29 NOTE — PLAN OF CARE
Problem: PAIN - ADULT  Goal: Verbalizes/displays adequate comfort level or baseline comfort level  Description: Interventions:  - Encourage patient to monitor pain and request assistance  - Assess pain using appropriate pain scale  - Administer analgesics as ordered based on type and severity of pain and evaluate response  - Implement non-pharmacological measures as appropriate and evaluate response  - Consider cultural and social influences on pain and pain management  - Notify physician/advanced practitioner if interventions unsuccessful or patient reports new pain  - Educate patient/family on pain management process including their role and importance of  reporting pain   - Provide non-pharmacologic/complimentary pain relief interventions  Outcome: Progressing     Problem: INFECTION - ADULT  Goal: Absence or prevention of progression during hospitalization  Description: INTERVENTIONS:  - Assess and monitor for signs and symptoms of infection  - Monitor lab/diagnostic results  - Monitor all insertion sites, i.e. indwelling lines, tubes, and drains  - Monitor endotracheal if appropriate and nasal secretions for changes in amount and color  - [place] appropriate cooling/warming therapies per order  - Administer medications as ordered  - Instruct and encourage patient and family to use good hand hygiene technique  - Identify and instruct in appropriate isolation precautions for identified infection/condition  Outcome: Progressing  Goal: Absence of fever/infection during neutropenic period  Description: INTERVENTIONS:  - Monitor WBC  - Perform strict hand hygiene  - Limit to healthy visitors only  - No plants, dried, fresh or silk flowers with mcclure in patient room  Outcome: Progressing     Problem: SAFETY ADULT  Goal: Patient will remain free of falls  Description: INTERVENTIONS:  - Educate patient/family on patient safety including physical limitations  - Instruct patient to call for assistance with activity   -  Consider consulting OT/PT to assist with strengthening/mobility based on AM PAC & JH-HLM score  - Consult OT/PT to assist with strengthening/mobility   - Keep Call bell within reach  - Keep bed low and locked with side rails adjusted as appropriate  - Keep care items and personal belongings within reach  - Initiate and maintain comfort rounds  - Make Fall Risk Sign visible to staff  - Offer Toileting every 2 Hours, in advance of need  - Initiate/Maintain bed alarm  - Obtain necessary fall risk management equipment: alarms  - Apply yellow socks and bracelet for high fall risk patients  - Consider moving patient to room near nurses station  Outcome: Progressing  Goal: Maintain or return to baseline ADL function  Description: INTERVENTIONS:  -  Assess patient's ability to carry out ADLs; assess patient's baseline for ADL function and identify physical deficits which impact ability to perform ADLs (bathing, care of mouth/teeth, toileting, grooming, dressing, etc.)  - Assess/evaluate cause of self-care deficits   - Assess range of motion  - Assess patient's mobility; develop plan if impaired  - Assess patient's need for assistive devices and provide as appropriate  - Encourage maximum independence but intervene and supervise when necessary  - Involve family in performance of ADLs  - Assess for home care needs following discharge   - Consider OT consult to assist with ADL evaluation and planning for discharge  - Provide patient education as appropriate  - Monitor functional capacity and physical performance, use of AM PAC & JH-HLM   - Monitor gait, balance and fatigue with ambulation    Outcome: Progressing  Goal: Maintains/Returns to pre admission functional level  Description: INTERVENTIONS:  - Perform AM-PAC 6 Click Basic Mobility/ Daily Activity assessment daily.  - Set and communicate daily mobility goal to care team and patient/family/caregiver.   - Collaborate with rehabilitation services on mobility goals if  consulted  - Perform Range of Motion 4 times a day.  - Reposition patient every 2 hours.  - Dangle patient 3 times a day  - Stand patient 3 times a day  - Ambulate patient 3 times a day  - Out of bed to chair 3 times a day   - Out of bed for meals 3 times a day  - Out of bed for toileting  - Record patient progress and toleration of activity level   Outcome: Progressing     Problem: DISCHARGE PLANNING  Goal: Discharge to home or other facility with appropriate resources  Description: INTERVENTIONS:  - Identify barriers to discharge w/patient and caregiver  - Arrange for needed discharge resources and transportation as appropriate  - Identify discharge learning needs (meds, wound care, etc.)  - Arrange for interpretive services to assist at discharge as needed  - Refer to Case Management Department for coordinating discharge planning if the patient needs post-hospital services based on physician/advanced practitioner order or complex needs related to functional status, cognitive ability, or social support system  Outcome: Progressing     Problem: Knowledge Deficit  Goal: Patient/family/caregiver demonstrates understanding of disease process, treatment plan, medications, and discharge instructions  Description: Complete learning assessment and assess knowledge base.  Interventions:  - Provide teaching at level of understanding  - Provide teaching via preferred learning methods  Outcome: Progressing     Problem: GASTROINTESTINAL - ADULT  Goal: Minimal or absence of nausea and/or vomiting  Description: INTERVENTIONS:  - Administer IV fluids if ordered to ensure adequate hydration  - Maintain NPO status until nausea and vomiting are resolved  - Nasogastric tube if ordered  - Administer ordered antiemetic medications as needed  - Provide nonpharmacologic comfort measures as appropriate  - Advance diet as tolerated, if ordered  - Consider nutrition services referral to assist patient with adequate nutrition and appropriate  food choices  Outcome: Progressing  Goal: Maintains adequate nutritional intake  Description: INTERVENTIONS:  - Monitor percentage of each meal consumed  - Identify factors contributing to decreased intake, treat as appropriate  - Assist with meals as needed  - Monitor I&O, weight, and lab values if indicated  - Obtain nutrition services referral as needed  Outcome: Progressing     Problem: METABOLIC, FLUID AND ELECTROLYTES - ADULT  Goal: Electrolytes maintained within normal limits  Description: INTERVENTIONS:  - Monitor labs and assess patient for signs and symptoms of electrolyte imbalances  - Administer electrolyte replacement as ordered  - Monitor response to electrolyte replacements, including repeat lab results as appropriate  - Instruct patient on fluid and nutrition as appropriate  Outcome: Progressing  Goal: Fluid balance maintained  Description: INTERVENTIONS:  - Monitor labs   - Monitor I/O and WT  - Instruct patient on fluid and nutrition as appropriate  - Assess for signs & symptoms of volume excess or deficit  Outcome: Progressing  Goal: Glucose maintained within target range  Description: INTERVENTIONS:  - Monitor Blood Glucose as ordered  - Assess for signs and symptoms of hyperglycemia and hypoglycemia  - Administer ordered medications to maintain glucose within target range  - Assess nutritional intake and initiate nutrition service referral as needed  Outcome: Progressing     Problem: SKIN/TISSUE INTEGRITY - ADULT  Goal: Skin Integrity remains intact(Skin Breakdown Prevention)  Description: Assess:  -Perform Jose assessment every shift  -Clean and moisturize skin every shift  -Inspect skin when repositioning, toileting, and assisting with ADLS  -Assess under medical devices such as IV every shift  -Assess extremities for adequate circulation and sensation     Bed Management:  -Have minimal linens on bed & keep smooth, unwrinkled  -Change linens as needed when moist or perspiring  -Avoid sitting  or lying in one position for more than 2 hours while in bed  -Keep HOB at 30 degrees     Toileting:  -Offer bedside commode  -Assess for incontinence every shift  -Use incontinent care products after each incontinent episode such as foam cleanser    Activity:  -Mobilize patient 3 times a day  -Encourage activity and walks on unit  -Encourage or provide ROM exercises   -Turn and reposition patient every 2 Hours  -Use appropriate equipment to lift or move patient in bed  -Instruct/ Assist with weight shifting every hour when out of bed in chair  -Consider limitation of chair time 1 hour intervals    Skin Care:  -Avoid use of baby powder, tape, friction and shearing, hot water or constrictive clothing  -Relieve pressure over bony prominences using pillows  -Do not massage red bony areas    Next Steps:  -Teach patient strategies to minimize risks such as skin breakdown   -Consider consults to  interdisciplinary teams such as wound care  Outcome: Progressing     Problem: HEMATOLOGIC - ADULT  Goal: Maintains hematologic stability  Description: INTERVENTIONS  - Assess for signs and symptoms of bleeding or hemorrhage  - Monitor labs  - Administer supportive blood products/factors as ordered and appropriate  Outcome: Progressing     Problem: MUSCULOSKELETAL - ADULT  Goal: Maintain or return mobility to safest level of function  Description: INTERVENTIONS:  - Assess patient's ability to carry out ADLs; assess patient's baseline for ADL function and identify physical deficits which impact ability to perform ADLs (bathing, care of mouth/teeth, toileting, grooming, dressing, etc.)  - Assess/evaluate cause of self-care deficits   - Assess range of motion  - Assess patient's mobility  - Assess patient's need for assistive devices and provide as appropriate  - Encourage maximum independence but intervene and supervise when necessary  - Involve family in performance of ADLs  - Assess for home care needs following discharge   -  Consider OT consult to assist with ADL evaluation and planning for discharge  - Provide patient education as appropriate  Outcome: Progressing  Goal: Maintain proper alignment of affected body part  Description: INTERVENTIONS:  - Support, maintain and protect limb and body alignment  - Provide patient/ family with appropriate education  Outcome: Progressing     Problem: Prexisting or High Potential for Compromised Skin Integrity  Goal: Skin integrity is maintained or improved  Description: INTERVENTIONS:  - Identify patients at risk for skin breakdown  - Assess and monitor skin integrity including under and around medical devices   - Assess and monitor nutrition and hydration status  - Monitor labs  - Assess for incontinence   - Turn and reposition patient  - Assist with mobility/ambulation  - Relieve pressure over keith prominences   - Avoid friction and shearing  - Provide appropriate hygiene as needed including keeping skin clean and dry  - Evaluate need for skin moisturizer/barrier cream  - Collaborate with interdisciplinary team  - Patient/family teaching  - Consider wound care consult    Assess:  - Review Jose scale daily  - Clean and moisturize skin every shift  - Inspect skin when repositioning, toileting, and assisting with ADLS  - Assess under medical devices such as IV every shift  - Assess extremities for adequate circulation and sensation     Bed Management:  - Have minimal linens on bed & keep smooth, unwrinkled  - Change linens as needed when moist or perspiring  - Avoid sitting or lying in one position for more than 2 hours while in bed?Keep HOB at 30 degrees   - Toileting:  - Offer bedside commode  - Assess for incontinence every shift  - Use incontinent care products after each incontinent episode such as foam cleanser    Activity:  - Mobilize patient 3 times a day  - Encourage activity and walks on unit  - Encourage or provide ROM exercises   - Turn and reposition patient every 2 Hours  - Use  appropriate equipment to lift or move patient in bed  - Instruct/ Assist with weight shifting every hour when out of bed in chair  - Consider limitation of chair time 12 hour intervals    Skin Care:  - Avoid use of baby powder, tape, friction and shearing, hot water or constrictive clothing  - Relieve pressure over bony prominences using pillows  - Do not massage red bony areas    Next Steps:  - Teach patient strategies to minimize risks such as skin breakdown  - Consider consults to  interdisciplinary teams such as wound care  Outcome: Progressing     Problem: MOBILITY - ADULT  Goal: Maintain or return to baseline ADL function  Description: INTERVENTIONS:  -  Assess patient's ability to carry out ADLs; assess patient's baseline for ADL function and identify physical deficits which impact ability to perform ADLs (bathing, care of mouth/teeth, toileting, grooming, dressing, etc.)  - Assess/evaluate cause of self-care deficits   - Assess range of motion  - Assess patient's mobility; develop plan if impaired  - Assess patient's need for assistive devices and provide as appropriate  - Encourage maximum independence but intervene and supervise when necessary  - Involve family in performance of ADLs  - Assess for home care needs following discharge   - Consider OT consult to assist with ADL evaluation and planning for discharge  - Provide patient education as appropriate  - Monitor functional capacity and physical performance, use of AM PAC & JH-HLM   - Monitor gait, balance and fatigue with ambulation    Outcome: Progressing  Goal: Maintains/Returns to pre admission functional level  Description: INTERVENTIONS:  - Perform AM-PAC 6 Click Basic Mobility/ Daily Activity assessment daily.  - Set and communicate daily mobility goal to care team and patient/family/caregiver.   - Collaborate with rehabilitation services on mobility goals if consulted  - Perform Range of Motion 4 times a day.  - Reposition patient every 2  hours.  - Dangle patient 3 times a day  - Stand patient 3 times a day  - Ambulate patient 3 times a day  - Out of bed to chair 3 times a day   - Out of bed for meals 3 times a day  - Out of bed for toileting  - Record patient progress and toleration of activity level   Outcome: Progressing

## 2025-05-29 NOTE — ASSESSMENT & PLAN NOTE
Lab Results   Component Value Date    EGFR 29 05/29/2025    EGFR 27 05/28/2025    EGFR 28 05/27/2025    CREATININE 2.41 (H) 05/29/2025    CREATININE 2.53 (H) 05/28/2025    CREATININE 2.47 (H) 05/27/2025   After review of records it appears that the patient has a baseline Creatinine of 1.5-2.0 mg/dL.  Avoid nephrotoxins, adjust meds to appropriate GFR.  Likely has underlying CKD secondary to poorly controlled diabetes plus hypertensive nephrosclerosis plus age-related nephron loss  Outpatient for nephrology follows up with nurse practitioner Tanika last seen 2019

## 2025-05-29 NOTE — ASSESSMENT & PLAN NOTE
55-year-old male with a past medical history of hypertension, CKD, diabetes mellitus type 2, chronic pancreatitis secondary to chronic alcohol use who presented to [facility] on 5/23 with altered mental status likely due to elevated blood glucose.  Fortunately now mental status has improved.  However, patient's LFTs were also significantly elevated - AST/ALT 48/51, alkaline phosphatase 1069, total bilirubin 4.18.  INR also elevated to 5.53 but now improved status post vitamin K.  Patient has had LFT elevations in the past.  Admission RUQ US with no acute findings including grossly stable liver and biliary tree.  Prior MRI imaging completed for evaluation of LFT elevation in 2024 also unremarkable.  Complete serologic liver workup completed and was negative for any competing cause of liver injury.  Suspect that LFT elevations are likely due to longstanding history of alcohol use with a component of alcoholic hepatitis. Suspect that this is superimposed on some degree of fibrosis.  Given improvement in INR and LFTs, no indication for steroids as MDF is currently less than 32.  Ultimately recommend conservative management from GI perspective.  Will require close outpatient GI/hepatology follow-up to ensure LFTs have improved and also for consideration of liver biopsy versus alternative imaging to further risk stratify patient.  Also strongly recommend complete alcohol cessation moving forward.    Plan:  Monitor and trend LFTs daily along with INR  Avoid hepatotoxic medications, strongly encourage complete alcohol cessation  Consider liver biopsy outpatient given significant alkaline phosphatase elevation  Additional pain and symptom management per primary team  Will require outpatient GI/hepatology follow-up after discharge

## 2025-05-29 NOTE — OCCUPATIONAL THERAPY NOTE
"  Occupational Therapy Progress Note     Patient Name: Wes Araujo  Today's Date: 5/29/2025  Problem List  Principal Problem:    Toxic metabolic encephalopathy  Active Problems:    Primary hypertension    Type 2 diabetes mellitus with hyperglycemia, with long-term current use of insulin (HCC)    Alcohol abuse    Hyponatremia    History of atrial fibrillation    Acute renal failure superimposed on stage 3 chronic kidney disease (HCC)    Acute liver failure    Electrolyte abnormality    Sami  needed    Acute kidney injury (HCC)    CKD stage 3b, GFR 30-44 ml/min (HCC)    Other specified anemias    Elevated LFTs    Hyperkalemia            05/29/25 0915   Note Type   Note Type Treatment   Pain Assessment   Pain Assessment Tool FLACC   Pain Rating: FLACC (Rest) - Face 1   Pain Rating: FLACC (Rest) - Legs 0   Pain Rating: FLACC (Rest) - Activity 0   Pain Rating: FLACC (Rest) - Cry 1   Pain Rating: FLACC (Rest) - Consolability 0   Score: FLACC (Rest) 2   Restrictions/Precautions   Weight Bearing Precautions Per Order No   Other Precautions Cognitive;Chair Alarm;Bed Alarm;Fall Risk   ADL   Where Assessed Chair   Eating Assistance 5  Supervision/Setup   Grooming Assistance 5  Supervision/Setup   UB Bathing Assistance 5  Supervision/Setup   LB Bathing Assistance 4  Minimal Assistance   UB Dressing Assistance 5  Supervision/Setup   LB Dressing Assistance 4  Minimal Assistance   Functional Standing Tolerance   Time 4-5mins   Transfers   Sit to Stand 5  Supervision   Additional items Increased time required;Verbal cues   Stand to Sit 5  Supervision   Additional items Increased time required;Verbal cues   Additional Comments bp's=167/82(sitting in the chair); SPO2=96% on RA with ambulation   Functional Mobility   Functional Mobility 4  Minimal assistance   Additional Comments x1   Additional items Rolling walker   Therapeutic Exercise - ROM   UE-ROM Yes   Subjective   Subjective \"I'm tired.\"   Cognition "   Overall Cognitive Status Impaired   Arousal/Participation Alert   Attention Attends with cues to redirect   Orientation Level Oriented to person;Oriented to place;Disoriented to time;Disoriented to situation   Memory Decreased short term memory;Decreased recall of precautions   Following Commands Follows one step commands with increased time or repetition   Comments primary language is Wol[place]; [email or web address]  #[id number]; speaks some english   Additional Activities   Additional Activities Comments Partial co-tx with P.T. to improve safety with pt handling and provide safe physically challenging environment for tx interventions   Activity Tolerance   Activity Tolerance Patient limited by fatigue;Patient limited by pain   Medical Staff Made Aware nsg, P.T.   Assessment   Assessment Pt seen for 31min tx session with focus on functinal balance, functional mobility, ADL status, transfer safety, b/l UE ROM, and cognition. Pt able to tolerate OOB mobility; sitting balance=f+/f, standing balance=f-/p+. Pt required verbal cues/physical assistance to maintain transfer/walker safety. Pt demonstrating need for assistance with his LE ADLs. Pt fatigues quickly with functional mobility tasks. Cognitive deficits noted--i.e.orientation, memory, problem solving, judgement/safety. Pt pleasant and cooperative with tx session. The patient's raw score on the -PAC Daily Activity Inpatient Short Form is 20. A raw score of greater than or equal to 19 suggests the patient may benefit from discharge to home. Please refer to the recommendation of the Occupational Therapist for safe discharge planning.   Plan   Treatment Interventions ADL retraining;Functional transfer training;UE strengthening/ROM;Endurance training;Cognitive reorientation;Patient/family training;Equipment evaluation/education;Compensatory technique education;Continued evaluation   Goal Expiration Date 06/06/25   OT Treatment Day 1   OT Frequency   (2-5xwk/1-2wks)    Discharge Recommendation   Rehab Resource Intensity Level, OT II (Moderate Resource Intensity)   AM-PAC Daily Activity Inpatient   Lower Body Dressing 3   Bathing 3   Toileting 3   Upper Body Dressing 3   Grooming 4   Eating 4   Daily Activity Raw Score 20   Daily Activity Standardized Score (Calc for Raw Score >=11) 42.03   AM-PAC Applied Cognition Inpatient   Following a Speech/Presentation 3   Understanding Ordinary Conversation 3   Taking Medications 2   Remembering Where Things Are Placed or Put Away 2   Remembering List of 4-5 Errands 2   Taking Care of Complicated Tasks 2   Applied Cognition Raw Score 14   Applied Cognition Standardized Score 32.02     Chase Alcantara

## 2025-05-30 LAB
ALBUMIN SERPL BCG-MCNC: 3.7 G/DL (ref 3.5–5)
ALP SERPL-CCNC: 349 U/L (ref 34–104)
ALT SERPL W P-5'-P-CCNC: 19 U/L (ref 7–52)
ANION GAP SERPL CALCULATED.3IONS-SCNC: 13 MMOL/L (ref 4–13)
AST SERPL W P-5'-P-CCNC: 35 U/L (ref 13–39)
BILIRUB DIRECT SERPL-MCNC: 4.89 MG/DL (ref 0–0.2)
BILIRUB SERPL-MCNC: 7.84 MG/DL (ref 0.2–1)
BUN SERPL-MCNC: 24 MG/DL (ref 5–25)
CALCIUM SERPL-MCNC: 8.5 MG/DL (ref 8.4–10.2)
CHLORIDE SERPL-SCNC: 108 MMOL/L (ref 96–108)
CO2 SERPL-SCNC: 16 MMOL/L (ref 21–32)
CREAT SERPL-MCNC: 2.55 MG/DL (ref 0.6–1.3)
ERYTHROCYTE [DISTWIDTH] IN BLOOD BY AUTOMATED COUNT: 14.3 % (ref 11.6–15.1)
GFR SERPL CREATININE-BSD FRML MDRD: 27 ML/MIN/1.73SQ M
GLUCOSE SERPL-MCNC: 129 MG/DL (ref 65–140)
GLUCOSE SERPL-MCNC: 138 MG/DL (ref 65–140)
GLUCOSE SERPL-MCNC: 164 MG/DL (ref 65–140)
GLUCOSE SERPL-MCNC: 167 MG/DL (ref 65–140)
GLUCOSE SERPL-MCNC: 61 MG/DL (ref 65–140)
GLUCOSE SERPL-MCNC: 63 MG/DL (ref 65–140)
GLUCOSE SERPL-MCNC: 94 MG/DL (ref 65–140)
HCT VFR BLD AUTO: 19.7 % (ref 36.5–49.3)
HGB BLD-MCNC: 6.7 G/DL (ref 12–17)
INR PPP: 1.07 (ref 0.85–1.19)
MCH RBC QN AUTO: 31.3 PG (ref 26.8–34.3)
MCHC RBC AUTO-ENTMCNC: 34 G/DL (ref 31.4–37.4)
MCV RBC AUTO: 92 FL (ref 82–98)
PLATELET # BLD AUTO: 208 THOUSANDS/UL (ref 149–390)
PMV BLD AUTO: 11.4 FL (ref 8.9–12.7)
POTASSIUM SERPL-SCNC: 5.3 MMOL/L (ref 3.5–5.3)
PROT SERPL-MCNC: 5.6 G/DL (ref 6.4–8.4)
PROTHROMBIN TIME: 14.1 SECONDS (ref 12.3–15)
RBC # BLD AUTO: 2.14 MILLION/UL (ref 3.88–5.62)
SODIUM SERPL-SCNC: 137 MMOL/L (ref 135–147)
WBC # BLD AUTO: 9.68 THOUSAND/UL (ref 4.31–10.16)

## 2025-05-30 PROCEDURE — 99232 SBSQ HOSP IP/OBS MODERATE 35: CPT | Performed by: INTERNAL MEDICINE

## 2025-05-30 PROCEDURE — 80048 BASIC METABOLIC PNL TOTAL CA: CPT | Performed by: INTERNAL MEDICINE

## 2025-05-30 PROCEDURE — P9016 RBC LEUKOCYTES REDUCED: HCPCS

## 2025-05-30 PROCEDURE — 30233N1 TRANSFUSION OF NONAUTOLOGOUS RED BLOOD CELLS INTO PERIPHERAL VEIN, PERCUTANEOUS APPROACH: ICD-10-PCS | Performed by: INTERNAL MEDICINE

## 2025-05-30 PROCEDURE — 85027 COMPLETE CBC AUTOMATED: CPT | Performed by: INTERNAL MEDICINE

## 2025-05-30 PROCEDURE — 82948 REAGENT STRIP/BLOOD GLUCOSE: CPT

## 2025-05-30 PROCEDURE — 80076 HEPATIC FUNCTION PANEL: CPT | Performed by: INTERNAL MEDICINE

## 2025-05-30 PROCEDURE — 85610 PROTHROMBIN TIME: CPT | Performed by: INTERNAL MEDICINE

## 2025-05-30 RX ORDER — SODIUM BICARBONATE 650 MG/1
650 TABLET ORAL
Status: DISCONTINUED | OUTPATIENT
Start: 2025-05-30 | End: 2025-06-01

## 2025-05-30 RX ORDER — CARVEDILOL 3.12 MG/1
3.12 TABLET ORAL 2 TIMES DAILY WITH MEALS
Status: DISCONTINUED | OUTPATIENT
Start: 2025-05-30 | End: 2025-05-31

## 2025-05-30 RX ADMIN — SODIUM BICARBONATE 650 MG TABLET 650 MG: at 11:49

## 2025-05-30 RX ADMIN — HYDRALAZINE HYDROCHLORIDE 25 MG: 25 TABLET ORAL at 13:44

## 2025-05-30 RX ADMIN — AMLODIPINE BESYLATE 5 MG: 5 TABLET ORAL at 16:00

## 2025-05-30 RX ADMIN — AMLODIPINE BESYLATE 5 MG: 5 TABLET ORAL at 08:00

## 2025-05-30 RX ADMIN — FOLIC ACID 1 MG: 1 TABLET ORAL at 08:00

## 2025-05-30 RX ADMIN — CARVEDILOL 3.12 MG: 3.12 TABLET, FILM COATED ORAL at 16:00

## 2025-05-30 RX ADMIN — INSULIN LISPRO 1 UNITS: 100 INJECTION, SOLUTION INTRAVENOUS; SUBCUTANEOUS at 07:53

## 2025-05-30 RX ADMIN — ALBUMIN (HUMAN) 25 G: 0.25 INJECTION, SOLUTION INTRAVENOUS at 05:22

## 2025-05-30 RX ADMIN — HYDRALAZINE HYDROCHLORIDE 25 MG: 25 TABLET ORAL at 21:34

## 2025-05-30 RX ADMIN — INSULIN ASPART 5 UNITS: 100 INJECTION, SUSPENSION SUBCUTANEOUS at 07:54

## 2025-05-30 RX ADMIN — HYDRALAZINE HYDROCHLORIDE 25 MG: 25 TABLET ORAL at 05:21

## 2025-05-30 RX ADMIN — INSULIN LISPRO 1 UNITS: 100 INJECTION, SOLUTION INTRAVENOUS; SUBCUTANEOUS at 17:17

## 2025-05-30 RX ADMIN — Medication 100 MG: at 08:00

## 2025-05-30 RX ADMIN — SODIUM BICARBONATE 650 MG TABLET 650 MG: at 16:00

## 2025-05-30 RX ADMIN — INSULIN ASPART 5 UNITS: 100 INJECTION, SUSPENSION SUBCUTANEOUS at 17:16

## 2025-05-30 NOTE — UTILIZATION REVIEW
Continued Stay Review    Date: 5/30/25                          Current Patient Class: Inpatient  Current Level of Care: MS    HPI:55 y.o. male initially admitted on 5/23/25   Current Diagnosis: Toxic metabolic encephalopathy, ZENAIDA, Acute liver failure, Other specified Anemia.     5/30/25 Assessment/Plan:   Pt denies any complaints. Agreeable to alcohol rehab. On exam, scleral icterus present, skin jaundiced. Mentation improved.   Abnormal labs: CO2 16, Creat 2.55, Alk phos 349, T Protein 5.6, T bili 7.84, Bilirubin direct 4.89, H/H 6.7/19.7. Plan: Transfuse a unit of PRBC today, Hold off on further Albumin, Add Coreg BID. Start Sodium bicarb BID,    Certification Statement: The patient will continue to require additional inpatient hospital stay due to liver and kidney injuries     Medications:   Scheduled Medications:  amLODIPine, 5 mg, Oral, BID  folic acid, 1 mg, Oral, Daily  hydrALAZINE, 25 mg, Oral, Q8H JAISON  insulin aspart protamine-insulin aspart, 5 Units, Subcutaneous, BID AC  insulin lispro, 1-5 Units, Subcutaneous, TID AC  insulin lispro, 1-5 Units, Subcutaneous, HS  thiamine, 100 mg, Oral, Daily  albumin human (FLEXBUMIN) 25 % injection 25 g  Dose: 25 g  Freq: Every 6 hours Route: IV  Start: 05/29/25 1115 End: 05/30/25 0522  carvedilol (COREG) tablet 3.125 mg  Dose: 3.125 mg  Freq: 2 times daily with meals Route: PO  Start: 05/30/25 1630  sodium bicarbonate tablet 650 mg  Dose: 650 mg  Freq: 2 times daily after meals Route: PO  Start: 05/30/25 1130    Continuous IV Infusions: NONE     PRN Meds:  hydrALAZINE, 10 mg, Intravenous, Q4H PRN  ondansetron, 4 mg, Intravenous, Q4H PRN    Completed transfusions    Ordered   Start   05/30/25 1104  Transfuse Leukoreduced RBC  Transfusion         05/30/25 1104   05/28/25 1033  Transfuse Leukoreduced RBC  Transfusion         05/28/25 1033       Discharge Plan: TBD    Vital Signs (last 3 days)       Date/Time Temp Pulse Resp BP MAP (mmHg) SpO2 O2 Device Patient Position -  Orthostatic VS [place] Coma Scale Score Pain    05/30/25 11:53:52 97.3 °F (36.3 °C) 93 15 165/80 108 98 % -- -- -- --    05/30/25 11:41:26 97.4 °F (36.3 °C) 99 16 170/82 111 98 % -- -- -- --    05/30/25 11:14:40 98 °F (36.7 °C) 91 16 171/83 112 98 % -- -- -- --    05/30/25 07:39:25 98.5 °F (36.9 °C) 95 18 173/83 113 98 % -- -- -- --    05/30/25 0709 -- -- -- -- -- -- -- -- -- No Pain    05/30/25 0521 -- -- -- 171/81 -- -- -- -- -- --    05/29/25 21:00:09 98.1 °F (36.7 °C) 89 20 158/80 106 97 % -- -- -- No Pain    05/29/25 17:43:14 -- 91 -- 160/82 108 97 % -- -- -- --    05/29/25 1742 -- -- -- 160/82 -- -- -- -- -- --    05/29/25 15:04:19 98.1 °F (36.7 °C) 97 18 158/80 106 98 % None (Room air) Lying -- --    05/29/25 1435 -- 98 -- 146/73 -- 98 % -- -- -- --    05/29/25 12:57:53 -- 91 -- 168/84 112 97 % -- -- -- --    05/29/25 09:42:11 -- 98 -- 167/82 110 98 % -- -- -- --    05/29/25 0927 -- -- -- -- -- -- -- -- -- 9    05/29/25 0811 -- -- -- -- -- -- None (Room air) -- 15 No Pain    05/29/25 07:46:50 99.1 °F (37.3 °C) 91 18 174/80 111 97 % None (Room air) Lying -- --    05/29/25 05:23:47 -- 95 -- 176/80 112 97 % -- -- -- --    05/28/25 2200 -- -- -- -- -- -- -- -- -- No Pain    05/28/25 21:09:27 98.8 °F (37.1 °C) 83 20 155/74 101 97 % None (Room air) Lying -- --    05/28/25 14:38:28 98.2 °F (36.8 °C) 94 18 151/74 100 99 % None (Room air) Lying -- --    05/28/25 11:12:55 98.3 °F (36.8 °C) 95 17 163/85 111 98 % -- -- -- --    05/28/25 10:45:19 98.1 °F (36.7 °C) 90 16 163/84 110 97 % -- -- -- --    05/28/25 07:54:47 98 °F (36.7 °C) 97 16 160/84 109 99 % None (Room air) Lying -- --    05/28/25 0714 -- -- -- -- -- -- -- -- -- No Pain    05/28/25 05:50:30 -- 92 -- 162/81 108 97 % -- -- -- --    05/28/25 0550 -- -- -- 162/81 -- -- -- -- -- --    05/27/25 21:06:58 98.1 °F (36.7 °C) 100 20 159/80 106 98 % -- -- -- --    05/27/25 2100 -- -- -- -- -- -- -- -- -- No Pain    05/27/25 15:15:25 98.1 °F (36.7 °C) 91 18 157/91 113 99  % -- -- -- --    05/27/25 14:40:38 -- 100 -- 140/79 99 99 % -- -- -- --    05/27/25 13:01:52 -- 100 -- 153/83 106 96 % -- -- -- --    05/27/25 07:29:35 98.4 °F (36.9 °C) 93 17 160/88 112 98 % -- -- -- --    05/27/25 0713 -- -- -- -- -- -- -- -- -- No Pain    05/27/25 0538 -- -- -- 164/89 114 -- -- -- -- --          Weight (last 2 days)       Date/Time Weight    05/30/25 0537 60.7 (133.82)    05/29/25 0545 62 (136.69)    05/28/25 0600 61.6 (135.8)            Pertinent Labs/Diagnostic Results:   Radiology:  XR chest 1 view   Final Interpretation by Miguel Angel Regalado MD (05/27 1617)      Left lower lobe atelectasis versus infiltrate            Workstation performed: [id number]         US right upper quadrant   Final Interpretation by Solomon Spears DO (05/23 2317)      Limited exam.      Heterogeneous appearance of the pancreas, correlates with fatty replacement and calcifications seen on prior CT, possibly sequela of chronic inflammation/chronic pancreatitis.      Liver appears grossly unremarkable.      Small volume hypoechoic ascites, and other findings as above.      Workstation performed: [id number]         CT head without contrast   Final Interpretation by Eleuterio Campa MD (05/23 2103)      No acute intracranial abnormality.                  Workstation performed: [id number]           Cardiology:  ECG 12 lead   Final Result by Will Guajardo DO (05/24 1407)   Sinus tachycardia   Abnormal ECG   When compared with ECG of 26-Apr-2025 16:01,   Borderline criteria for Inferior infarct are no longer Present   Non-specific change in ST segment in Inferior leads   ST no longer depressed in Anterior leads   T wave amplitude has decreased in Lateral leads   Confirmed by Will Guajardo ([id number]) on 5/24/2025 6:32:16 AM          Results from last 7 days   Lab Units 05/30/25  0533 05/29/25  0459 05/28/25  0508 05/27/25  0430 05/26/25  0541 05/25/25  0450 05/24/25  0441 05/23/25  1855   WBC Thousand/uL 9.68 9.70 9.29  11.67* 12.21*   < > 20.48* 11.35*   HEMOGLOBIN g/dL 6.7* 7.2* 6.5* 7.2* 8.3*   < > 8.6* 9.7*   HEMATOCRIT % 19.7* 20.9* 19.5* 21.8* 25.0*   < > 25.5* 28.9*   PLATELETS Thousands/uL 208 219 243 261 279   < > 278 220   TOTAL NEUT ABS Thousands/µL  --   --   --   --   --   --  16.11* 9.42*    < > = values in this interval not displayed.         Results from last 7 days   Lab Units 05/30/25  0533 05/29/25  0459 05/28/25  0508 05/27/25  0430 05/26/25  0541 05/25/25  0450 05/24/25  1639 05/24/25  0613 05/24/25  0441 05/23/25  2155   SODIUM mmol/L 137 135 134* 132* 133*  --    < > 135 134* 128*   POTASSIUM mmol/L 5.3 5.6* 5.1 5.0 5.3  --    < > 3.8 6.6* 3.4*   CHLORIDE mmol/L 108 109* 109* 105 104  --    < > 104 102 97   CO2 mmol/L 16* 21 21 21 24  --    < > 22 20* 23   ANION GAP mmol/L 13 5 4 6 5  --    < > 9 12 8   BUN mg/dL 24 21 22 21 16  --    < > 12 12 13   CREATININE mg/dL 2.55* 2.41* 2.53* 2.47* 2.23*  --    < > 1.78* 1.72* 1.97*   EGFR ml/min/1.73sq m 27 29 27 28 31  --    < > 42 43 37   CALCIUM mg/dL 8.5 9.0 8.5 8.1* 8.3*  --    < > 8.6 8.5 8.2*   CALCIUM, IONIZED mmol/L  --   --   --   --   --  1.04*  --   --   --  1.13   MAGNESIUM mg/dL  --   --  2.3  --  2.5 2.6  --  1.8* 1.8* 1.9   PHOSPHORUS mg/dL  --  2.4* 2.8  --  4.6* 4.7*  --  2.5* 9.3* 1.9*    < > = values in this interval not displayed.     Results from last 7 days   Lab Units 05/30/25  0533 05/29/25  0459 05/28/25  0508 05/27/25  0430 05/26/25  0541 05/25/25  0450 05/24/25  0441 05/23/25  1855   AST U/L 35 33 43* 49* 51* 60*   < > 48*   ALT U/L 19 22 27 33 34 37   < > 51   ALK PHOS U/L 349* 422* 529* 646* 711* 785*   < > 1,069*   TOTAL PROTEIN g/dL 5.6* 5.3* 5.3* 5.4* 5.5* 5.5*   < > 6.0*   ALBUMIN g/dL 3.7 3.7 3.0* 2.5* 2.4* 2.5*   < > 2.8*   TOTAL BILIRUBIN mg/dL 7.84* 7.91* 7.26* 7.14* 3.61* 3.17*   < > 4.18*   BILIRUBIN DIRECT mg/dL 4.89*  --   --   --   --  1.80*  --  2.54*   AMMONIA umol/L  --   --  34  --   --   --   --  36    < > = values in  this interval not displayed.     Results from last 7 days   Lab Units 05/30/25  1225 05/30/25  1140 05/30/25  1116 05/30/25  0741 05/29/25  2058 05/29/25  1604 05/29/25  1111 05/29/25  0746 05/28/25  2108 05/28/25  1618 05/28/25  1106 05/28/25  0755   POC GLUCOSE mg/dl 94 61* 63* 164* 165* 96 142* 125 103 183* 168* 139     Results from last 7 days   Lab Units 05/30/25  0533 05/29/25  0459 05/28/25  0508 05/27/25  0430 05/26/25  0541 05/24/25  1639 05/24/25  0613 05/24/25  0441 05/23/25  2155 05/23/25  1855   GLUCOSE RANDOM mg/dL 138 143* 172* 216* 216* 188* 41* 110 615* 785*         Results from last 7 days   Lab Units 05/26/25  1202   HEMOGLOBIN A1C % 9.6*   EAG mg/dl 229     Beta- Hydroxybutyrate   Date Value Ref Range Status   05/23/2025 0.21 0.20 - 0.60 mmol/L Final   04/26/2025 <0.05 0.02 - 0.27 mmol/L Final   02/05/2025 <0.05 0.02 - 0.27 mmol/L Final      Results from last 7 days   Lab Units 05/23/25 2058   PH ART  7.360   PCO2 ART mm Hg 37.2   PO2 ART mm Hg 107.5   HCO3 ART mmol/L 20.5*   BASE EXC ART mmol/L -4.4   O2 CONTENT ART mL/dL 14.2*   O2 HGB, ARTERIAL % 97.1*   ABG SOURCE  Brachial, Right     Results from last 7 days   Lab Units 05/23/25  1929   PH [place]  7.230*   PCO2 [place] mm Hg 54.8*   PO2 [place] mm Hg 27.7*   HCO3 [place] mmol/L 22.4*   BASE EXC [place] mmol/L -5.3   O2 CONTENT [place] ml/dL 7.2   O2 HGB, VENOUS % 48.2*         Results from last 7 days   Lab Units 05/23/25  1855   CK TOTAL U/L 62     Results from last 7 days   Lab Units 05/24/25  0030 05/23/25  2155 05/23/25 1855   HS TNI 0HR ng/L  --   --  13   HS TNI 2HR ng/L  --  14  --    HSTNI D2 ng/L  --  1  --    HS TNI 4HR ng/L 16  --   --    HSTNI D4 ng/L 3  --   --          Results from last 7 days   Lab Units 05/30/25  0533 05/28/25  0508 05/27/25  0430 05/25/25  0450 05/24/25  0441 05/23/25  1855   PROTIME seconds 14.1 14.0 14.5   < > 41.5* 36.1*   INR  1.07 1.06 1.11   < > 4.50* 3.73*   PTT seconds  --   --   --   --  39* 30    < > = values in this  interval not displayed.         Results from last 7 days   Lab Units 05/24/25  0441 05/23/25  2155   PROCALCITONIN ng/ml 1.03* 1.09*     Results from last 7 days   Lab Units 05/24/25  1639   FERRITIN ng/mL 683*   IRON SATURATION % 7*   IRON ug/dL 10*   TIBC ug/dL 142.8*     Results from last 7 days   Lab Units 05/24/25  1639   TRANSFERRIN mg/dL 102*     Results from last 7 days   Lab Units 05/30/25  1137 05/29/25  0530   UNIT PRODUCT CODE  [id number]   UNIT NUMBER  [id number]   UNITABO  A A   UNITRH  POS POS   CROSSMATCH  Compatible Compatible   UNIT DISPENSE STATUS  Issued Presumed Trans   UNIT PRODUCT VOL ml 350 350     Results from last 7 days   Lab Units 05/24/25  0441   HEP B S AG  Non-reactive   HEP C AB  Non-reactive   HEP B C IGM  Non-reactive     Results from last 7 days   Lab Units 05/23/25  1855   LIPASE u/L <6*     Results from last 7 days   Lab Units 05/26/25  1712   CLARITY UA  Clear   COLOR UA  Yellow   SPEC GRAV UA  1.015   PH UA  6.0   GLUCOSE UA mg/dl 100 (1/10%)*   KETONES UA mg/dl Negative   BLOOD UA  Small*   PROTEIN UA mg/dl 200 (2+)*   NITRITE UA  Negative   BILIRUBIN UA  Small*   UROBILINOGEN UA (BE) mg/dl <2.0   LEUKOCYTES UA  Negative   WBC UA /hpf 4-10*   RBC UA /hpf 10-20*   BACTERIA UA /hpf None Seen   EPITHELIAL CELLS WET PREP /hpf Occasional             Results from last 7 days   Lab Units 05/23/25  2157   AMPH/METH  Negative   BARBITURATE UR  Negative   BENZODIAZEPINE UR  Negative   COCAINE UR  Negative   METHADONE URINE  Negative   OPIATE UR  Negative   PCP UR  Negative   THC UR  Negative     Results from last 7 days   Lab Units 05/23/25  1855   ETHANOL LVL mg/dL <10   ACETAMINOPHEN LVL ug/mL <2*   SALICYLATE LVL mg/dL <5*       Results from last 7 days   Lab Units 05/26/25  1742   BLOOD CULTURE  No Growth at 72 hrs.  No Growth at 72 hrs.       Network Utilization Review Department  ATTENTION: Please call with any questions or concerns to [phone] and  carefully listen to the prompts so that you are directed to the right person. All voicemails are confidential.   For Discharge needs, contact Care Management DC Support Team at [phone] opt. 2  Send all requests for admission clinical reviews, approved or denied determinations and any other requests to dedicated fax number below belonging to the campus where the patient is receiving treatment. List of dedicated fax numbers for the Facilities:  FACILITY NAME UR FAX NUMBER   ADMISSION DENIALS (Administrative/Medical Necessity) [phone]   DISCHARGE SUPPORT TEAM (NETWORK) [phone]   PARENT CHILD HEALTH (Maternity/NICU/Pediatrics) [phone]   [facility] [phone]   [facility] [phone]   [facility] [phone]   [facility] [phone]   [facility] [phone]   [facility] [phone]   [facility] [phone]   [facility] [phone]   [facility] [phone]   [facility] [phone]   [facility] [phone]   [facility] [phone]

## 2025-05-30 NOTE — PROGRESS NOTES
Follow up Consultation    Nephrology   Wes Araujo 55 y.o. male MRN: [id number]  Unit/Bed#: [place] [address] -01 Encounter: [id number]      Physician Requesting Consult: Bala Leroy DO  55 y.o. male with pmh of multiple comorbidities including alcohol use disorder, chronic pancreatitis, diabetes, A-fib, hypertension and noncompliance initially presented with changes in mental status and agitation. During the course of the hospital stay was treated for HHNK and alcohol withdrawal. Nephrology has been consulted on 5/27/2025 for evaluation and management of abnormal renal parameters.   Assessment & Plan  Acute kidney injury (HCC)  ZENAIDA most likely secondary to prerenal azotemia secondary to osmotic diuresis plus some component of intravascular volume depletion in light of hypoalbuminemia plus cholemic nephrosis with subsequent ATN versus HRS in the presence of acute liver injury  After review of records it appears that the patient has a baseline Creatinine of 1.5-2.0 mg/dL.  Admission creatinine of 1.99 mg/dL on 5/23.  Creatinine today is at 2.55 mg/dL appears to be plateauing   check BMP, magnesium, phosphorus in a.m.  Hold further albumin  Status post Bumex 2 mg IV x 1 to help with potassium excretion  Discussed with patient we will have to wait a period of 3 months to see where new baseline creatinine would be  Await renal recovery.  Place on a renal diet when allowed diet order.   CT abdomen with contrast/26/25 no hydronephrosis unremarkable kidneys  Strict I/O.  Daily weights  Urinary retention protocol  Avoid nephrotoxins, adjust meds to appropriate GFR.  Likely has underlying CKD secondary to poorly controlled diabetes plus hypertensive nephrosclerosis plus age-related nephron loss  Outpatient for nephrology follows up with nurse practitioner Tanika last seen 2019  CKD stage 3b, GFR 30-44 ml/min ([facility])  Lab Results   Component Value Date    EGFR 27 05/30/2025    EGFR 29 05/29/2025    EGFR 27 05/28/2025     CREATININE 2.55 (H) 05/30/2025    CREATININE 2.41 (H) 05/29/2025    CREATININE 2.53 (H) 05/28/2025   After review of records it appears that the patient has a baseline Creatinine of 1.5-2.0 mg/dL.  Avoid nephrotoxins, adjust meds to appropriate GFR.  Likely has underlying CKD secondary to poorly controlled diabetes plus hypertensive nephrosclerosis plus age-related nephron loss  Outpatient for nephrology follows up with nurse practitioner Tanika last seen 2019    Hyponatremia  Most recent serum sodium 135 mill equivalents stable improving   Check BMP in a.m.  Primary hypertension  Optimize hemodynamic status to avoid renal ischemic injury.  Maintain MAP > 65mmHg  Avoid BP fluctuations.  Home medications: Hydralazine 25 mg p.o. every 8, Norvasc 5 mg p.o. twice daily  Current medications: Norvasc 5 mg p.o. twice daily, hydralazine 25 mg p.o. every 8  Hold off on further albumin for now  Add Coreg 3.125 mg p.o. twice daily  Type 2 diabetes mellitus with hyperglycemia, with long-term current use of insulin ([facility])  Lab Results   Component Value Date    HGBA1C 9.6 (H) 05/26/2025       Recent Labs     05/29/25  1111 05/29/25  1604 05/29/25  2058 05/30/25  0741   POCGLU 142* 96 165* 164*       Blood Sugar Average: Last 72 hrs:  (P) 156.2343432494786790  On insulin  Recommend tight glycemic control  Alcohol abuse  Monitor for withdrawal  History of atrial fibrillation  Follow-up with cardiology  Acute liver failure  Follow-up with GI  Rising LFTs and bilirubin levels  Other specified anemias  most recent hemoglobin at 6.7 g/dL   Maintain hemoglobin greater than 8 grams/deciliter  Follow-up with primary team  Monitor closely CBC for further  Recommend another unit PRBC today  Elevated LFTs  Follow-up with GI  Hyperkalemia  Most recent potassium 5.3  Placed on low potassium diet  Check BMP in a.m.  Metabolic acidosis  Most recent bicarb at 16  Start sodium bicarb 650 mg p.o. twice daily for now        Thanks for the  "consult  Will continue to follow  Please call with questions/ concerns.  Above-mentioned orders and Plan in terms of continue low potassium diet start sodium bicarb hold albumin were discussed with the team in depth and they agree.  Please secure chat the Nephrologist on call for this campus if you have any Nephrological questions or concerns.    Bibi Solis MD, FASN, 2025, 11:12 AM              Objective :   Seen and examined in his room blood pressure is elevated afebrile urine output 700 cc plus family at bedside to use language line for translation no overt complaints      PHYSICAL EXAM  BP (!) 173/83   Pulse 95   Temp 98.5 °F (36.9 °C)   Resp 18   Ht 5' 9\" (1.753 m)   Wt 60.7 kg (133 lb 13.1 oz)   SpO2 98%   BMI 19.76 kg/m²   Temp (24hrs), Av.2 °F (36.8 °C), Min:98.1 °F (36.7 °C), Max:98.5 °F (36.9 °C)        Intake/Output Summary (Last 24 hours) at 2025 1112  Last data filed at 2025 1018  Gross per 24 hour   Intake 1320 ml   Output 900 ml   Net 420 ml       I/O last 24 hours:  In: 2040 [P.O.:204]  Out: 900 [Urine:900]      Current Weight: Weight - Scale: 60.7 kg (133 lb 13.1 oz)  First Weight: Weight - Scale: 55.4 kg (122 lb 2.2 oz)  Physical Exam  Vitals and nursing note reviewed.   Constitutional:       General: He is not in acute distress.     Appearance: He is normal weight.   HENT:      Head: Normocephalic and atraumatic.      Mouth/Throat:      Pharynx: No oropharyngeal exudate.     Cardiovascular:      Rate and Rhythm: Normal rate.   Pulmonary:      Effort: No respiratory distress.      Breath sounds: No wheezing.   Abdominal:      General: There is no distension.      Tenderness: There is no abdominal tenderness.     Musculoskeletal:         General: No swelling.     Skin:     Coloration: Skin is not jaundiced.     Neurological:      General: No focal deficit present.      Mental Status: He is alert and oriented to person, place, and time.     Psychiatric:         Mood " and Affect: Mood normal.         Behavior: Behavior normal.             Review of Systems   Constitutional:  Negative for chills and fatigue.   Respiratory:  Negative for cough.    Cardiovascular:  Negative for leg swelling.   Gastrointestinal:  Negative for constipation.   Genitourinary:  Negative for decreased urine volume.   Musculoskeletal:  Negative for back pain.   Neurological:  Negative for headaches.   Psychiatric/Behavioral:  Negative for agitation.    All other systems reviewed and are negative.      Scheduled Meds:  Current Facility-Administered Medications   Medication Dose Route Frequency Provider Last Rate    amLODIPine  5 mg Oral BID Bibi Solis MD      folic acid  1 mg Oral Daily Bala Leroy DO      hydrALAZINE  10 mg Intravenous Q4H PRN Veronica Phyllis Petersen, CRNP      hydrALAZINE  25 mg Oral Q8H JAISON Veronica Phyllis Petersen, CRNP      insulin aspart protamine-insulin aspart  5 Units Subcutaneous BID AC Veronica Phyllis Petersen, CRNP      insulin lispro  1-5 Units Subcutaneous TID AC Veronica Phyllis Petersen, CRNP      insulin lispro  1-5 Units Subcutaneous HS Veronica Phyllis Petersen, CRNP      ondansetron  4 mg Intravenous Q4H PRN Bala Leroy,       thiamine  100 mg Oral Daily Bala Leroy DO         PRN Meds:.  hydrALAZINE    ondansetron    Continuous Infusions:       Invasive Devices:     Invasive Devices       Peripheral Intravenous Line  Duration             Peripheral IV 05/28/25 Dorsal (posterior);Left Hand 2 days                      LABORATORY:    Results from last 7 days   Lab Units 05/30/25  0533 05/29/25  0459 05/28/25  0508 05/27/25  0430 05/26/25  0541 05/25/25  0450 05/24/25  1639 05/24/25  0613 05/24/25  0441 05/23/25  2155   WBC Thousand/uL 9.68 9.70 9.29 11.67* 12.21* 19.15*  --   --  20.48*  --    HEMOGLOBIN g/dL 6.7* 7.2* 6.5* 7.2* 8.3* 9.5*  --   --  8.6*  --    HEMATOCRIT % 19.7* 20.9* 19.5* 21.8* 25.0* 27.4*  --   --  25.5*  --    PLATELETS Thousands/uL 208 219 243 261 279 306  --   --  278  --   "  POTASSIUM mmol/L 5.3 5.6* 5.1 5.0 5.3  --  4.9 3.8 6.6* 3.4*   CHLORIDE mmol/L 108 109* 109* 105 104  --  101 104 102 97   CO2 mmol/L 16* 21 21 21 24  --  22 22 20* 23   BUN mg/dL 24 21 22 21 16  --  12 12 12 13   CREATININE mg/dL 2.55* 2.41* 2.53* 2.47* 2.23*  --  1.72* 1.78* 1.72* 1.97*   CALCIUM mg/dL 8.5 9.0 8.5 8.1* 8.3*  --  8.4 8.6 8.5 8.2*   MAGNESIUM mg/dL  --   --  2.3  --  2.5 2.6  --  1.8* 1.8* 1.9   PHOSPHORUS mg/dL  --  2.4* 2.8  --  4.6* 4.7*  --  2.5* 9.3* 1.9*      rest all reviewed    RADIOLOGY:  XR chest 1 view   Final Result by Miguel Angel Regalado MD (05/27 1617)      Left lower lobe atelectasis versus infiltrate            Workstation performed: [id number]         US right upper quadrant   Final Result by Solomon Spears DO (05/23 2314)      Limited exam.      Heterogeneous appearance of the pancreas, correlates with fatty replacement and calcifications seen on prior CT, possibly sequela of chronic inflammation/chronic pancreatitis.      Liver appears grossly unremarkable.      Small volume hypoechoic ascites, and other findings as above.      Workstation performed: [id number]         CT head without contrast   Final Result by Eleuterio Campa MD (05/23 2103)      No acute intracranial abnormality.                  Workstation performed: [id number]           Rest all reviewed    Portions of the record may have been created with voice recognition software. Occasional wrong word or \"sound a like\" substitutions may have occurred due to the inherent limitations of voice recognition software. Read the chart carefully and recognize, using context, where substitutions have occurred.If you have any questions, please contact the dictating provider.  "

## 2025-05-30 NOTE — ASSESSMENT & PLAN NOTE
Acute kidney injury on CKD 3 baseline creatinine approximately 1.5-2.0  Related to progressive liver dysfunction and dehydration  Nephrology following: Completed albumin challenge.  Ordered carvedilol.    Results from last 7 days   Lab Units 05/30/25  0533 05/29/25  0459 05/28/25  0508 05/27/25  0430 05/26/25  0541 05/24/25  1639 05/24/25  0613 05/24/25  0441 05/23/25  2155   BUN mg/dL 24 21 22 21 16 12 12 12 13   CREATININE mg/dL 2.55* 2.41* 2.53* 2.47* 2.23* 1.72* 1.78* 1.72* 1.97*   EGFR ml/min/1.73sq m 27 29 27 28 31 43 42 43 37

## 2025-05-30 NOTE — ASSESSMENT & PLAN NOTE
Lab Results   Component Value Date    HGBA1C 9.6 (H) 05/26/2025       Recent Labs     05/29/25  1111 05/29/25  1604 05/29/25 2058 05/30/25  0741   POCGLU 142* 96 165* 164*       Blood Sugar Average: Last 72 hrs:  (P) 156.5518337101640572  On insulin  Recommend tight glycemic control

## 2025-05-30 NOTE — CASE MANAGEMENT
Case Management Discharge Planning Note    Patient name Wes Araujo  Location [facility] 2 /[facility] 2 M* MRN [id number]  : 1970 Date 2025       Current Admission Date: 2025  Current Admission Diagnosis:Toxic metabolic encephalopathy   Patient Active Problem List    Diagnosis Date Noted    Hyperkalemia 2025    Acute kidney injury (HCC) 2025    CKD stage 3b, GFR 30-44 ml/min (HCC) 2025    Other specified anemias 2025    Elevated LFTs 2025    Lithuanian  needed 2025    Electrolyte abnormality 2025    Acute liver failure 2025    Abdominal pain 2025    CKD stage 3a, GFR 45-59 ml/min (HCC) 2025    Alcohol-induced chronic pancreatitis (HCC) 2024    Toxic metabolic encephalopathy 2024    Seizure (HCC) 2022    Bilateral hearing loss 2020    Acute renal failure superimposed on stage 3 chronic kidney disease (HCC) 2019    Anemia 2019    Hyponatremia 2019    History of atrial fibrillation 2019    Metabolic acidosis 2019    ZENAIDA (acute kidney injury) (HCC) 2019    Primary hypertension     Type 2 diabetes mellitus with hyperglycemia, with long-term current use of insulin (HCC)     Alcohol abuse     Paroxysmal A-fib (HCC)       LOS (days): 7  Geometric Mean LOS (GMLOS) (days):   Days to GMLOS:     OBJECTIVE:  Risk of Unplanned Readmission Score: 30.14         Current admission status: Inpatient   Preferred Pharmacy:    [facility] - [address] [place] [id number]  Phone: [phone] Fax: [phone]    [facility]  [place] [facility] - [address]  [facility],  [address]  [facility],  [facility] [address]  Phone: [phone] Fax: [phone]    [facility]/pharmacy #[id number] - [place] [facility] - [address] [facility]  [address] [facility] [address]  Phone: [phone] Fax: [phone]    Primary Care Provider:  Rush Kebede MD    Primary Insurance: [employer]  Secondary Insurance:     DISCHARGE DETAILS:    Discharge planning discussed with:: pt using [facility] #[id number]     Comments - Freedom of Choice: Discuxsed possible ETOH rehab with pt being agreeable to IP rehab- understands HOST will call his room phone to perform an assessment.  Due to pt stating he has difficulty hearing on the phone, he is hopeful staff can see him in person.  Referral made to HOST as agreed upon informing them of the latter information.  CM contacted family/caregiver?: Yes  Were Treatment Team discharge recommendations reviewed with patient/caregiver?: Yes  Did patient/caregiver verbalize understanding of patient care needs?: Yes       Contacts  Patient Contacts: Wes Araujo  Relationship to Patient:: Family  Contact Method: Phone  Phone Number: [phone]  Reason/Outcome: Discharge Planning, Referral (VM left re: pt's choice for IP ETOH rehab)                   Would you like to participate in our [facility] service program?  : No - Declined    Treatment Team Recommendation: Substance Abuse Treatment, Short Term Rehab, Home with Home Health Care  Discharge Destination Plan:: Substance Abuse Treatment (Pt agreeable to ETOH IP Rehab to date/time.)

## 2025-05-30 NOTE — ASSESSMENT & PLAN NOTE
ZENAIDA most likely secondary to prerenal azotemia secondary to osmotic diuresis plus some component of intravascular volume depletion in light of hypoalbuminemia plus cholemic nephrosis with subsequent ATN versus HRS in the presence of acute liver injury  After review of records it appears that the patient has a baseline Creatinine of 1.5-2.0 mg/dL.  Admission creatinine of 1.99 mg/dL on 5/23.  Creatinine today is at 2.55 mg/dL appears to be plateauing   check BMP, magnesium, phosphorus in a.m.  Hold further albumin  Status post Bumex 2 mg IV x 1 to help with potassium excretion  Discussed with patient we will have to wait a period of 3 months to see where new baseline creatinine would be  Await renal recovery.  Place on a renal diet when allowed diet order.   CT abdomen with contrast/26/25 no hydronephrosis unremarkable kidneys  Strict I/O.  Daily weights  Urinary retention protocol  Avoid nephrotoxins, adjust meds to appropriate GFR.  Likely has underlying CKD secondary to poorly controlled diabetes plus hypertensive nephrosclerosis plus age-related nephron loss  Outpatient for nephrology follows up with nurse practitioner Tanika last seen 2019

## 2025-05-30 NOTE — ASSESSMENT & PLAN NOTE
Continue amlodipine 5 mg twice daily, hydralazine 25 mg 3 times daily  Nephrology added carvedilol

## 2025-05-30 NOTE — ASSESSMENT & PLAN NOTE
Lab Results   Component Value Date    EGFR 27 05/30/2025    EGFR 29 05/29/2025    EGFR 27 05/28/2025    CREATININE 2.55 (H) 05/30/2025    CREATININE 2.41 (H) 05/29/2025    CREATININE 2.53 (H) 05/28/2025   After review of records it appears that the patient has a baseline Creatinine of 1.5-2.0 mg/dL.  Avoid nephrotoxins, adjust meds to appropriate GFR.  Likely has underlying CKD secondary to poorly controlled diabetes plus hypertensive nephrosclerosis plus age-related nephron loss  Outpatient for nephrology follows up with nurse practitioner Tanika last seen 2019

## 2025-05-30 NOTE — ASSESSMENT & PLAN NOTE
Hyponatremia secondary to hyperglycemia and progressive liver disease    Results from last 7 days   Lab Units 05/30/25  0533 05/29/25  0459 05/28/25  0508 05/27/25  0430   SODIUM mmol/L 137 135 134* 132*

## 2025-05-30 NOTE — PLAN OF CARE
Problem: PAIN - ADULT  Goal: Verbalizes/displays adequate comfort level or baseline comfort level  Description: Interventions:  - Encourage patient to monitor pain and request assistance  - Assess pain using appropriate pain scale  - Administer analgesics as ordered based on type and severity of pain and evaluate response  - Implement non-pharmacological measures as appropriate and evaluate response  - Consider cultural and social influences on pain and pain management  - Notify physician/advanced practitioner if interventions unsuccessful or patient reports new pain  - Educate patient/family on pain management process including their role and importance of  reporting pain   - Provide non-pharmacologic/complimentary pain relief interventions  Outcome: Progressing     Problem: INFECTION - ADULT  Goal: Absence or prevention of progression during hospitalization  Description: INTERVENTIONS:  - Assess and monitor for signs and symptoms of infection  - Monitor lab/diagnostic results  - Monitor all insertion sites, i.e. indwelling lines, tubes, and drains  - Monitor endotracheal if appropriate and nasal secretions for changes in amount and color  - [place] appropriate cooling/warming therapies per order  - Administer medications as ordered  - Instruct and encourage patient and family to use good hand hygiene technique  - Identify and instruct in appropriate isolation precautions for identified infection/condition  Outcome: Progressing  Goal: Absence of fever/infection during neutropenic period  Description: INTERVENTIONS:  - Monitor WBC  - Perform strict hand hygiene  - Limit to healthy visitors only  - No plants, dried, fresh or silk flowers with mcclure in patient room  Outcome: Progressing     Problem: SAFETY ADULT  Goal: Patient will remain free of falls  Description: INTERVENTIONS:  - Educate patient/family on patient safety including physical limitations  - Instruct patient to call for assistance with activity   -  Consider consulting OT/PT to assist with strengthening/mobility based on AM PAC & JH-HLM score  - Consult OT/PT to assist with strengthening/mobility   - Keep Call bell within reach  - Keep bed low and locked with side rails adjusted as appropriate  - Keep care items and personal belongings within reach  - Initiate and maintain comfort rounds  - Make Fall Risk Sign visible to staff  - Offer Toileting every 2 Hours, in advance of need  - Initiate/Maintain bed alarm  - Obtain necessary fall risk management equipment: alarms  - Apply yellow socks and bracelet for high fall risk patients  - Consider moving patient to room near nurses station  Outcome: Progressing  Goal: Maintain or return to baseline ADL function  Description: INTERVENTIONS:  -  Assess patient's ability to carry out ADLs; assess patient's baseline for ADL function and identify physical deficits which impact ability to perform ADLs (bathing, care of mouth/teeth, toileting, grooming, dressing, etc.)  - Assess/evaluate cause of self-care deficits   - Assess range of motion  - Assess patient's mobility; develop plan if impaired  - Assess patient's need for assistive devices and provide as appropriate  - Encourage maximum independence but intervene and supervise when necessary  - Involve family in performance of ADLs  - Assess for home care needs following discharge   - Consider OT consult to assist with ADL evaluation and planning for discharge  - Provide patient education as appropriate  - Monitor functional capacity and physical performance, use of AM PAC & JH-HLM   - Monitor gait, balance and fatigue with ambulation    Outcome: Progressing  Goal: Maintains/Returns to pre admission functional level  Description: INTERVENTIONS:  - Perform AM-PAC 6 Click Basic Mobility/ Daily Activity assessment daily.  - Set and communicate daily mobility goal to care team and patient/family/caregiver.   - Collaborate with rehabilitation services on mobility goals if  consulted  - Perform Range of Motion 4 times a day.  - Reposition patient every 2 hours.  - Dangle patient 3 times a day  - Stand patient 3 times a day  - Ambulate patient 3 times a day  - Out of bed to chair 3 times a day   - Out of bed for meals 3 times a day  - Out of bed for toileting  - Record patient progress and toleration of activity level   Outcome: Progressing     Problem: DISCHARGE PLANNING  Goal: Discharge to home or other facility with appropriate resources  Description: INTERVENTIONS:  - Identify barriers to discharge w/patient and caregiver  - Arrange for needed discharge resources and transportation as appropriate  - Identify discharge learning needs (meds, wound care, etc.)  - Arrange for interpretive services to assist at discharge as needed  - Refer to Case Management Department for coordinating discharge planning if the patient needs post-hospital services based on physician/advanced practitioner order or complex needs related to functional status, cognitive ability, or social support system  Outcome: Progressing     Problem: Knowledge Deficit  Goal: Patient/family/caregiver demonstrates understanding of disease process, treatment plan, medications, and discharge instructions  Description: Complete learning assessment and assess knowledge base.  Interventions:  - Provide teaching at level of understanding  - Provide teaching via preferred learning methods  Outcome: Progressing     Problem: GASTROINTESTINAL - ADULT  Goal: Minimal or absence of nausea and/or vomiting  Description: INTERVENTIONS:  - Administer IV fluids if ordered to ensure adequate hydration  - Maintain NPO status until nausea and vomiting are resolved  - Nasogastric tube if ordered  - Administer ordered antiemetic medications as needed  - Provide nonpharmacologic comfort measures as appropriate  - Advance diet as tolerated, if ordered  - Consider nutrition services referral to assist patient with adequate nutrition and appropriate  food choices  Outcome: Progressing  Goal: Maintains adequate nutritional intake  Description: INTERVENTIONS:  - Monitor percentage of each meal consumed  - Identify factors contributing to decreased intake, treat as appropriate  - Assist with meals as needed  - Monitor I&O, weight, and lab values if indicated  - Obtain nutrition services referral as needed  Outcome: Progressing     Problem: METABOLIC, FLUID AND ELECTROLYTES - ADULT  Goal: Electrolytes maintained within normal limits  Description: INTERVENTIONS:  - Monitor labs and assess patient for signs and symptoms of electrolyte imbalances  - Administer electrolyte replacement as ordered  - Monitor response to electrolyte replacements, including repeat lab results as appropriate  - Instruct patient on fluid and nutrition as appropriate  Outcome: Progressing  Goal: Fluid balance maintained  Description: INTERVENTIONS:  - Monitor labs   - Monitor I/O and WT  - Instruct patient on fluid and nutrition as appropriate  - Assess for signs & symptoms of volume excess or deficit  Outcome: Progressing  Goal: Glucose maintained within target range  Description: INTERVENTIONS:  - Monitor Blood Glucose as ordered  - Assess for signs and symptoms of hyperglycemia and hypoglycemia  - Administer ordered medications to maintain glucose within target range  - Assess nutritional intake and initiate nutrition service referral as needed  Outcome: Progressing     Problem: SKIN/TISSUE INTEGRITY - ADULT  Goal: Skin Integrity remains intact(Skin Breakdown Prevention)  Description: Assess:  -Perform Jose assessment every shift  -Clean and moisturize skin every shift  -Inspect skin when repositioning, toileting, and assisting with ADLS  -Assess under medical devices such as IV every shift  -Assess extremities for adequate circulation and sensation     Bed Management:  -Have minimal linens on bed & keep smooth, unwrinkled  -Change linens as needed when moist or perspiring  -Avoid sitting  or lying in one position for more than 2 hours while in bed  -Keep HOB at 30 degrees     Toileting:  -Offer bedside commode  -Assess for incontinence every shift  -Use incontinent care products after each incontinent episode such as foam cleanser    Activity:  -Mobilize patient 3 times a day  -Encourage activity and walks on unit  -Encourage or provide ROM exercises   -Turn and reposition patient every 2 Hours  -Use appropriate equipment to lift or move patient in bed  -Instruct/ Assist with weight shifting every hour when out of bed in chair  -Consider limitation of chair time 1 hour intervals    Skin Care:  -Avoid use of baby powder, tape, friction and shearing, hot water or constrictive clothing  -Relieve pressure over bony prominences using pillows  -Do not massage red bony areas    Next Steps:  -Teach patient strategies to minimize risks such as skin breakdown   -Consider consults to  interdisciplinary teams such as wound care  Outcome: Progressing     Problem: HEMATOLOGIC - ADULT  Goal: Maintains hematologic stability  Description: INTERVENTIONS  - Assess for signs and symptoms of bleeding or hemorrhage  - Monitor labs  - Administer supportive blood products/factors as ordered and appropriate  Outcome: Progressing     Problem: MUSCULOSKELETAL - ADULT  Goal: Maintain or return mobility to safest level of function  Description: INTERVENTIONS:  - Assess patient's ability to carry out ADLs; assess patient's baseline for ADL function and identify physical deficits which impact ability to perform ADLs (bathing, care of mouth/teeth, toileting, grooming, dressing, etc.)  - Assess/evaluate cause of self-care deficits   - Assess range of motion  - Assess patient's mobility  - Assess patient's need for assistive devices and provide as appropriate  - Encourage maximum independence but intervene and supervise when necessary  - Involve family in performance of ADLs  - Assess for home care needs following discharge   -  Consider OT consult to assist with ADL evaluation and planning for discharge  - Provide patient education as appropriate  Outcome: Progressing  Goal: Maintain proper alignment of affected body part  Description: INTERVENTIONS:  - Support, maintain and protect limb and body alignment  - Provide patient/ family with appropriate education  Outcome: Progressing     Problem: Prexisting or High Potential for Compromised Skin Integrity  Goal: Skin integrity is maintained or improved  Description: INTERVENTIONS:  - Identify patients at risk for skin breakdown  - Assess and monitor skin integrity including under and around medical devices   - Assess and monitor nutrition and hydration status  - Monitor labs  - Assess for incontinence   - Turn and reposition patient  - Assist with mobility/ambulation  - Relieve pressure over keith prominences   - Avoid friction and shearing  - Provide appropriate hygiene as needed including keeping skin clean and dry  - Evaluate need for skin moisturizer/barrier cream  - Collaborate with interdisciplinary team  - Patient/family teaching  - Consider wound care consult    Assess:  - Review Jose scale daily  - Clean and moisturize skin every shift  - Inspect skin when repositioning, toileting, and assisting with ADLS  - Assess under medical devices such as IV every shift  - Assess extremities for adequate circulation and sensation     Bed Management:  - Have minimal linens on bed & keep smooth, unwrinkled  - Change linens as needed when moist or perspiring  - Avoid sitting or lying in one position for more than 2 hours while in bed?Keep HOB at 30 degrees   - Toileting:  - Offer bedside commode  - Assess for incontinence every shift  - Use incontinent care products after each incontinent episode such as foam cleanser    Activity:  - Mobilize patient 3 times a day  - Encourage activity and walks on unit  - Encourage or provide ROM exercises   - Turn and reposition patient every 2 Hours  - Use  appropriate equipment to lift or move patient in bed  - Instruct/ Assist with weight shifting every hour when out of bed in chair  - Consider limitation of chair time 12 hour intervals    Skin Care:  - Avoid use of baby powder, tape, friction and shearing, hot water or constrictive clothing  - Relieve pressure over bony prominences using pillows  - Do not massage red bony areas    Next Steps:  - Teach patient strategies to minimize risks such as skin breakdown  - Consider consults to  interdisciplinary teams such as wound care  Outcome: Progressing     Problem: MOBILITY - ADULT  Goal: Maintain or return to baseline ADL function  Description: INTERVENTIONS:  -  Assess patient's ability to carry out ADLs; assess patient's baseline for ADL function and identify physical deficits which impact ability to perform ADLs (bathing, care of mouth/teeth, toileting, grooming, dressing, etc.)  - Assess/evaluate cause of self-care deficits   - Assess range of motion  - Assess patient's mobility; develop plan if impaired  - Assess patient's need for assistive devices and provide as appropriate  - Encourage maximum independence but intervene and supervise when necessary  - Involve family in performance of ADLs  - Assess for home care needs following discharge   - Consider OT consult to assist with ADL evaluation and planning for discharge  - Provide patient education as appropriate  - Monitor functional capacity and physical performance, use of AM PAC & JH-HLM   - Monitor gait, balance and fatigue with ambulation    Outcome: Progressing  Goal: Maintains/Returns to pre admission functional level  Description: INTERVENTIONS:  - Perform AM-PAC 6 Click Basic Mobility/ Daily Activity assessment daily.  - Set and communicate daily mobility goal to care team and patient/family/caregiver.   - Collaborate with rehabilitation services on mobility goals if consulted  - Perform Range of Motion 4 times a day.  - Reposition patient every 2  hours.  - Dangle patient 3 times a day  - Stand patient 3 times a day  - Ambulate patient 3 times a day  - Out of bed to chair 3 times a day   - Out of bed for meals 3 times a day  - Out of bed for toileting  - Record patient progress and toleration of activity level   Outcome: Progressing

## 2025-05-30 NOTE — ASSESSMENT & PLAN NOTE
Evidence of worsening hyperbilirubinemia and coagulopathy  Being followed by gastroenterology.  Discriminant function does not meet criteria to initiate steroids for possible alcoholic hepatitis  Ruled out infection: Cultures no growth    Results from last 7 days   Lab Units 05/30/25  0533 05/29/25  0459 05/28/25  0508 05/27/25  0430 05/26/25  0541 05/25/25  0450 05/24/25  0441 05/23/25  1855   AST U/L 35 33 43* 49* 51* 60* 43* 48*   ALT U/L 19 22 27 33 34 37 42 51   TOTAL BILIRUBIN mg/dL 7.84* 7.91* 7.26* 7.14* 3.61* 3.17* 2.93* 4.18*     Results from last 7 days   Lab Units 05/30/25  0533 05/28/25  0508 05/27/25  0430 05/26/25  0541   INR  1.07 1.06 1.11 5.53*

## 2025-05-30 NOTE — ASSESSMENT & PLAN NOTE
No evidence of blood loss.  Transfused 1 unit PRBC 5/28, being transfused additional unit today.  EGD and colonoscopy February with dieulafoy lesion but no other sources of bleeding    Results from last 7 days   Lab Units 05/30/25  0533 05/29/25  0459 05/28/25  0508 05/27/25  0430 05/26/25  0541 05/25/25  0450 05/24/25  0441 05/23/25  1855   HEMOGLOBIN g/dL 6.7* 7.2* 6.5* 7.2* 8.3* 9.5* 8.6* 9.7*

## 2025-05-30 NOTE — ASSESSMENT & PLAN NOTE
Lab Results   Component Value Date    HGBA1C 9.6 (H) 05/26/2025     Recent Labs     05/30/25  1116 05/30/25  1140 05/30/25  1225 05/30/25  1635   POCGLU 63* 61* 94 167*     HHNK upon arrival likely related to noncompliance with medications and alcohol use  Restarted on 70/30 5 units twice daily did have hypoglycemia today.  Continue sliding scale

## 2025-05-30 NOTE — ASSESSMENT & PLAN NOTE
most recent hemoglobin at 6.7 g/dL   Maintain hemoglobin greater than 8 grams/deciliter  Follow-up with primary team  Monitor closely CBC for further  Recommend another unit PRBC today

## 2025-05-30 NOTE — PROGRESS NOTES
Progress Note - Hospitalist   Name: Wes Araujo 55 y.o. male I MRN: [id number]  Unit/Bed#: [place] [address] -01 I Date of Admission: 5/23/2025   Date of Service: 5/30/2025 I Hospital Day: 7    Assessment & Plan  Toxic metabolic encephalopathy  History of atrial fibrillation hypertension alcohol liver disease who presented to the hospital for change in mental status found to have hyperglycemia  Admitted to ICU and stabilized  GI consulted for liver injury/failure.  Father reports active consumption of alcohol  Mentation much improved and near baseline  Acute renal failure superimposed on stage 3 chronic kidney disease (HCC)  Acute kidney injury on CKD 3 baseline creatinine approximately 1.5-2.0  Related to progressive liver dysfunction and dehydration  Nephrology following: Completed albumin challenge.  Ordered carvedilol.    Results from last 7 days   Lab Units 05/30/25  0533 05/29/25  0459 05/28/25  0508 05/27/25  0430 05/26/25  0541 05/24/25  1639 05/24/25  0613 05/24/25  0441 05/23/25  2155   BUN mg/dL 24 21 22 21 16 12 12 12 13   CREATININE mg/dL 2.55* 2.41* 2.53* 2.47* 2.23* 1.72* 1.78* 1.72* 1.97*   EGFR ml/min/1.73sq m 27 29 27 28 31 43 42 43 37     Acute liver failure  Evidence of worsening hyperbilirubinemia and coagulopathy  Being followed by gastroenterology.  Discriminant function does not meet criteria to initiate steroids for possible alcoholic hepatitis  Ruled out infection: Cultures no growth    Results from last 7 days   Lab Units 05/30/25  0533 05/29/25  0459 05/28/25  0508 05/27/25  0430 05/26/25  0541 05/25/25  0450 05/24/25  0441 05/23/25  1855   AST U/L 35 33 43* 49* 51* 60* 43* 48*   ALT U/L 19 22 27 33 34 37 42 51   TOTAL BILIRUBIN mg/dL 7.84* 7.91* 7.26* 7.14* 3.61* 3.17* 2.93* 4.18*     Results from last 7 days   Lab Units 05/30/25  0533 05/28/25  0508 05/27/25  0430 05/26/25  0541   INR  1.07 1.06 1.11 5.53*     Other specified anemias  No evidence of blood loss.  Transfused 1 unit PRBC  5/28, being transfused additional unit today.  EGD and colonoscopy February with dieulafoy lesion but no other sources of bleeding    Results from last 7 days   Lab Units 05/30/25  0533 05/29/25  0459 05/28/25  0508 05/27/25  0430 05/26/25  0541 05/25/25  0450 05/24/25  0441 05/23/25  1855   HEMOGLOBIN g/dL 6.7* 7.2* 6.5* 7.2* 8.3* 9.5* 8.6* 9.7*     Primary hypertension  Continue amlodipine 5 mg twice daily, hydralazine 25 mg 3 times daily  Nephrology added carvedilol  Type 2 diabetes mellitus with hyperglycemia, with long-term current use of insulin ([facility])  Lab Results   Component Value Date    HGBA1C 9.6 (H) 05/26/2025     Recent Labs     05/30/25  1116 05/30/25  1140 05/30/25  1225 05/30/25  1635   POCGLU 63* 61* 94 167*     HHNK upon arrival likely related to noncompliance with medications and alcohol use  Restarted on 70/30 5 units twice daily did have hypoglycemia today.  Continue sliding scale  Alcohol abuse  History of alcohol use with withdrawal and withdrawal seizures  Father reported active consumption of alcohol  Continue thiamine and folic acid  Mexican  needed   [id number] used today  History of atrial fibrillation  History of atrial fibrillation on metoprolol  Not on anticoagulation at baseline  Electrolyte abnormality  Hyponatremia secondary to hyperglycemia and progressive liver disease    Results from last 7 days   Lab Units 05/30/25  0533 05/29/25  0459 05/28/25  0508 05/27/25  0430   SODIUM mmol/L 137 135 134* 132*       VTE Pharmacologic Prophylaxis:   Moderate Risk (Score 3-4) - Pharmacological DVT Prophylaxis Contraindicated. Sequential Compression Devices Ordered.    Mobility:   Basic Mobility Inpatient Raw Score: 21  JH-HLM Goal: 6: Walk 10 steps or more  JH-HLM Achieved: 8: Walk 250 feet ot more  JH-HLM Goal achieved. Continue to encourage appropriate mobility.    Patient Centered Rounds: I have performed bedside rounds with nursing staff today.  Discussions with  Specialists or Other Care Team Provider: Case management and nephrology    Education and Discussions with Family / Patient:     Current Length of Stay: 7 day(s)  Current Patient Status: Inpatient   Certification Statement: The patient will continue to require additional inpatient hospital stay due to liver and kidney injuries  Discharge Plan: Anticipate discharge in 48 hrs to alcohol rehab.  Patient agreeable as discussed via  today    Code Status: Level 1 - Full Code    Subjective   Patient seen and examined.  No complaints.  Agreeable to alcohol rehab    Objective   Vitals:   Temp (24hrs), Av.9 °F (36.6 °C), Min:97.3 °F (36.3 °C), Max:98.5 °F (36.9 °C)    Temp:  [97.3 °F (36.3 °C)-98.5 °F (36.9 °C)] 97.3 °F (36.3 °C)  HR:  [89-99] 93  Resp:  [15-20] 15  BP: (158-173)/(80-83) 165/80  SpO2:  [97 %-98 %] 98 %  Body mass index is 19.76 kg/m².     Input and Output Summary (last 24 hours):     Intake/Output Summary (Last 24 hours) at 2025 1647  Last data filed at 2025 1515  Gross per 24 hour   Intake 1433.33 ml   Output 650 ml   Net 783.33 ml       Physical Exam  Vitals reviewed.   Constitutional:       General: He is not in acute distress.  HENT:      Head: Atraumatic.     Eyes:      General: Scleral icterus present.      Extraocular Movements: Extraocular movements intact.       Cardiovascular:      Rate and Rhythm: Regular rhythm.   Pulmonary:      Effort: Pulmonary effort is normal. No respiratory distress.      Breath sounds: No stridor. Decreased breath sounds present.   Abdominal:      General: Bowel sounds are normal.      Palpations: Abdomen is soft.      Tenderness: There is no abdominal tenderness.     Musculoskeletal:         General: No tenderness.     Skin:     Coloration: Skin is jaundiced.      Findings: No bruising.     Neurological:      General: No focal deficit present.      Mental Status: He is alert.      Motor: No weakness.     Psychiatric:         Mood and Affect: Mood  normal.       Lines/Drains:  Invasive Devices       Peripheral Intravenous Line  Duration             Peripheral IV 05/28/25 Dorsal (posterior);Left Hand 2 days                            Lab Results: I have reviewed the following results:   Results from last 7 days   Lab Units 05/30/25  0533 05/29/25  0459 05/28/25  0508 05/27/25  0430 05/26/25  0541 05/25/25  0450   WBC Thousand/uL 9.68 9.70 9.29 11.67*   < > 19.15*   HEMOGLOBIN g/dL 6.7* 7.2* 6.5* 7.2*   < > 9.5*   PLATELETS Thousands/uL 208 219 243 261   < > 306   MCV fL 92 91 93 94   < > 88   TOTAL NEUT ABS Thousand/uL  --   --   --   --   --  15.70*   INR  1.07  --  1.06 1.11   < > 4.09*    < > = values in this interval not displayed.     Results from last 7 days   Lab Units 05/30/25  0533 05/29/25 0459 05/28/25  0508   SODIUM mmol/L 137 135 134*   POTASSIUM mmol/L 5.3 5.6* 5.1   CHLORIDE mmol/L 108 109* 109*   CO2 mmol/L 16* 21 21   ANION GAP mmol/L 13 5 4   BUN mg/dL 24 21 22   CREATININE mg/dL 2.55* 2.41* 2.53*   CALCIUM mg/dL 8.5 9.0 8.5   ALBUMIN g/dL 3.7 3.7 3.0*   TOTAL BILIRUBIN mg/dL 7.84* 7.91* 7.26*   ALK PHOS U/L 349* 422* 529*   ALT U/L 19 22 27   AST U/L 35 33 43*   EGFR ml/min/1.73sq m 27 29 27   GLUCOSE RANDOM mg/dL 138 143* 172*     Results from last 7 days   Lab Units 05/29/25  0459 05/28/25  0508 05/26/25  0541 05/25/25  0450 05/24/25  0613 05/24/25  0441   MAGNESIUM mg/dL  --  2.3 2.5 2.6 1.8* 1.8*   PHOSPHORUS mg/dL 2.4* 2.8 4.6* 4.7* 2.5* 9.3*     Results from last 7 days   Lab Units 05/23/25  1855   CK TOTAL U/L 62     Results from last 7 days   Lab Units 05/24/25  0030 05/23/25  2155 05/23/25  1855   HS TNI 0HR ng/L  --   --  13   HS TNI 2HR ng/L  --  14  --    HS TNI 4HR ng/L 16  --   --           Results from last 7 days   Lab Units 05/24/25  0441   PROCALCITONIN ng/ml 1.03*     Results from last 7 days   Lab Units 05/30/25  1635 05/30/25  1225 05/30/25  1140 05/30/25  1116 05/30/25  0741 05/29/25  2058 05/29/25  1604 05/29/25  1111  05/29/25  0746 05/28/25  2108 05/28/25  1618 05/28/25  1106   POC GLUCOSE mg/dl 167* 94 61* 63* 164* 165* 96 142* 125 103 183* 168*     Results from last 7 days   Lab Units 05/26/25  1202   HEMOGLOBIN A1C % 9.6*           Recent Cultures (last 7 days):   Results from last 7 days   Lab Units 05/26/25  1742   BLOOD CULTURE  No Growth at 72 hrs.  No Growth at 72 hrs.       Imaging:  Reviewed radiology reports from this admission including:  XR chest 1 view  Result Date: 5/27/2025  Impression: Left lower lobe atelectasis versus infiltrate Workstation performed: [id number]     US right upper quadrant  Result Date: 5/23/2025  Impression: Limited exam. Heterogeneous appearance of the pancreas, correlates with fatty replacement and calcifications seen on prior CT, possibly sequela of chronic inflammation/chronic pancreatitis. Liver appears grossly unremarkable. Small volume hypoechoic ascites, and other findings as above. Workstation performed: [id number]     CT head without contrast  Result Date: 5/23/2025  Impression: No acute intracranial abnormality. Workstation performed: [id number]       Last 24 Hours Medication List:     Current Facility-Administered Medications:     amLODIPine (NORVASC) tablet 5 mg, BID    carvedilol (COREG) tablet 3.125 mg, BID With Meals    folic acid (FOLVITE) tablet 1 mg, Daily    hydrALAZINE (APRESOLINE) injection 10 mg, Q4H PRN    hydrALAZINE (APRESOLINE) tablet 25 mg, Q8H JAISON    insulin aspart protamine-insulin aspart (NovoLOG 70/30) 100 units/mL subcutaneous injection 5 Units, BID AC    insulin lispro (HumALOG/ADMELOG) 100 units/mL subcutaneous injection 1-5 Units, TID AC **AND** Fingerstick Glucose (POCT), TID AC    insulin lispro (HumALOG/ADMELOG) 100 units/mL subcutaneous injection 1-5 Units, HS    ondansetron (ZOFRAN) injection 4 mg, Q4H PRN    sodium bicarbonate tablet 650 mg, BID after meals    thiamine tablet 100 mg, Daily    Administrative Statements   Today, Patient Was Seen By:  Bala Leroy, DO  I have spent a total time of 40 minutes in caring for this patient on the day of the visit/encounter including Risks and benefits of tx options, Instructions for management, Importance of tx compliance, Counseling / Coordination of care, Documenting in the medical record, and Reviewing/placing orders in the medical record (including tests, medications, and/or procedures).    **Please Note: This note may have been constructed using a voice recognition system.**

## 2025-05-30 NOTE — ASSESSMENT & PLAN NOTE
Optimize hemodynamic status to avoid renal ischemic injury.  Maintain MAP > 65mmHg  Avoid BP fluctuations.  Home medications: Hydralazine 25 mg p.o. every 8, Norvasc 5 mg p.o. twice daily  Current medications: Norvasc 5 mg p.o. twice daily, hydralazine 25 mg p.o. every 8  Hold off on further albumin for now  Add Coreg 3.125 mg p.o. twice daily

## 2025-05-30 NOTE — PLAN OF CARE
Problem: PAIN - ADULT  Goal: Verbalizes/displays adequate comfort level or baseline comfort level  Description: Interventions:  - Encourage patient to monitor pain and request assistance  - Assess pain using appropriate pain scale  - Administer analgesics as ordered based on type and severity of pain and evaluate response  - Implement non-pharmacological measures as appropriate and evaluate response  - Consider cultural and social influences on pain and pain management  - Notify physician/advanced practitioner if interventions unsuccessful or patient reports new pain  - Educate patient/family on pain management process including their role and importance of  reporting pain   - Provide non-pharmacologic/complimentary pain relief interventions  Outcome: Progressing     Problem: INFECTION - ADULT  Goal: Absence or prevention of progression during hospitalization  Description: INTERVENTIONS:  - Assess and monitor for signs and symptoms of infection  - Monitor lab/diagnostic results  - Monitor all insertion sites, i.e. indwelling lines, tubes, and drains  - Monitor endotracheal if appropriate and nasal secretions for changes in amount and color  - [place] appropriate cooling/warming therapies per order  - Administer medications as ordered  - Instruct and encourage patient and family to use good hand hygiene technique  - Identify and instruct in appropriate isolation precautions for identified infection/condition  Outcome: Progressing  Goal: Absence of fever/infection during neutropenic period  Description: INTERVENTIONS:  - Monitor WBC  - Perform strict hand hygiene  - Limit to healthy visitors only  - No plants, dried, fresh or silk flowers with m[place]cl[place] in patient room  Outcome: Progressing     Problem: SAFETY ADULT  Goal: Patient will remain free of falls  Description: INTERVENTIONS:  - Educate patient/family on patient safety including physical limitations  - Instruct patient to call for assistance with activity   -  Consider consulting OT/PT to assist with strengthening/mobility based on AM PAC & JH-HLM score  - Consult OT/PT to assist with strengthening/mobility   - Keep Call bell within reach  - Keep bed low and locked with side rails adjusted as appropriate  - Keep care items and personal belongings within reach  - Initiate and maintain comfort rounds  - Make Fall Risk Sign visible to staff  - Offer Toileting every 2 Hours, in advance of need  - Initiate/Maintain bed alarm  - Obtain necessary fall risk management equipment: alarms  - Apply yellow socks and bracelet for high fall risk patients  - Consider moving patient to room near nurses station  Outcome: Progressing  Goal: Maintain or return to baseline ADL function  Description: INTERVENTIONS:  -  Assess patient's ability to carry out ADLs; assess patient's baseline for ADL function and identify physical deficits which impact ability to perform ADLs (bathing, care of mouth/teeth, toileting, grooming, dressing, etc.)  - Assess/evaluate cause of self-care deficits   - Assess range of motion  - Assess patient's mobility; develop plan if impaired  - Assess patient's need for assistive devices and provide as appropriate  - Encourage maximum independence but intervene and supervise when necessary  - Involve family in performance of ADLs  - Assess for home care needs following discharge   - Consider OT consult to assist with ADL evaluation and planning for discharge  - Provide patient education as appropriate  - Monitor functional capacity and physical performance, use of AM PAC & JH-HLM   - Monitor gait, balance and fatigue with ambulation    Outcome: Progressing  Goal: Maintains/Returns to pre admission functional level  Description: INTERVENTIONS:  - Perform AM-PAC 6 Click Basic Mobility/ Daily Activity assessment daily.  - Set and communicate daily mobility goal to care team and patient/family/caregiver.   - Collaborate with rehabilitation services on mobility goals if  consulted  - Perform Range of Motion 4 times a day.  - Reposition patient every 2 hours.  - Dangle patient 3 times a day  - Stand patient 3 times a day  - Ambulate patient 3 times a day  - Out of bed to chair 3 times a day   - Out of bed for meals 3 times a day  - Out of bed for toileting  - Record patient progress and toleration of activity level   Outcome: Progressing     Problem: DISCHARGE PLANNING  Goal: Discharge to home or other facility with appropriate resources  Description: INTERVENTIONS:  - Identify barriers to discharge w/patient and caregiver  - Arrange for needed discharge resources and transportation as appropriate  - Identify discharge learning needs (meds, wound care, etc.)  - Arrange for interpretive services to assist at discharge as needed  - Refer to Case Management Department for coordinating discharge planning if the patient needs post-hospital services based on physician/advanced practitioner order or complex needs related to functional status, cognitive ability, or social support system  Outcome: Progressing     Problem: Knowledge Deficit  Goal: Patient/family/caregiver demonstrates understanding of disease process, treatment plan, medications, and discharge instructions  Description: Complete learning assessment and assess knowledge base.  Interventions:  - Provide teaching at level of understanding  - Provide teaching via preferred learning methods  Outcome: Progressing     Problem: GASTROINTESTINAL - ADULT  Goal: Minimal or absence of nausea and/or vomiting  Description: INTERVENTIONS:  - Administer IV fluids if ordered to ensure adequate hydration  - Maintain NPO status until nausea and vomiting are resolved  - Nasogastric tube if ordered  - Administer ordered antiemetic medications as needed  - Provide nonpharmacologic comfort measures as appropriate  - Advance diet as tolerated, if ordered  - Consider nutrition services referral to assist patient with adequate nutrition and appropriate  food choices  Outcome: Progressing  Goal: Maintains adequate nutritional intake  Description: INTERVENTIONS:  - Monitor percentage of each meal consumed  - Identify factors contributing to decreased intake, treat as appropriate  - Assist with meals as needed  - Monitor I&O, weight, and lab values if indicated  - Obtain nutrition services referral as needed  Outcome: Progressing     Problem: METABOLIC, FLUID AND ELECTROLYTES - ADULT  Goal: Electrolytes maintained within normal limits  Description: INTERVENTIONS:  - Monitor labs and assess patient for signs and symptoms of electrolyte imbalances  - Administer electrolyte replacement as ordered  - Monitor response to electrolyte replacements, including repeat lab results as appropriate  - Instruct patient on fluid and nutrition as appropriate  Outcome: Progressing  Goal: Fluid balance maintained  Description: INTERVENTIONS:  - Monitor labs   - Monitor I/O and WT  - Instruct patient on fluid and nutrition as appropriate  - Assess for signs & symptoms of volume excess or deficit  Outcome: Progressing  Goal: Glucose maintained within target range  Description: INTERVENTIONS:  - Monitor Blood Glucose as ordered  - Assess for signs and symptoms of hyperglycemia and hypoglycemia  - Administer ordered medications to maintain glucose within target range  - Assess nutritional intake and initiate nutrition service referral as needed  Outcome: Progressing     Problem: SKIN/TISSUE INTEGRITY - ADULT  Goal: Skin Integrity remains intact(Skin Breakdown Prevention)  Description: Assess:  -Perform Jose assessment every shift  -Clean and moisturize skin every shift  -Inspect skin when repositioning, toileting, and assisting with ADLS  -Assess under medical devices such as IV every shift  -Assess extremities for adequate circulation and sensation     Bed Management:  -Have minimal linens on bed & keep smooth, unwrinkled  -Change linens as needed when moist or perspiring  -Avoid sitting  or lying in one position for more than 2 hours while in bed  -Keep HOB at 30 degrees     Toileting:  -Offer bedside commode  -Assess for incontinence every shift  -Use incontinent care products after each incontinent episode such as foam cleanser    Activity:  -Mobilize patient 3 times a day  -Encourage activity and walks on unit  -Encourage or provide ROM exercises   -Turn and reposition patient every 2 Hours  -Use appropriate equipment to lift or move patient in bed  -Instruct/ Assist with weight shifting every hour when out of bed in chair  -Consider limitation of chair time 1 hour intervals    Skin Care:  -Avoid use of baby powder, tape, friction and shearing, hot water or constrictive clothing  -Relieve pressure over bony prominences using pillows  -Do not massage red bony areas    Next Steps:  -Teach patient strategies to minimize risks such as skin breakdown   -Consider consults to  interdisciplinary teams such as wound care  Outcome: Progressing     Problem: HEMATOLOGIC - ADULT  Goal: Maintains hematologic stability  Description: INTERVENTIONS  - Assess for signs and symptoms of bleeding or hemorrhage  - Monitor labs  - Administer supportive blood products/factors as ordered and appropriate  Outcome: Progressing     Problem: MUSCULOSKELETAL - ADULT  Goal: Maintain or return mobility to safest level of function  Description: INTERVENTIONS:  - Assess patient's ability to carry out ADLs; assess patient's baseline for ADL function and identify physical deficits which impact ability to perform ADLs (bathing, care of mouth/teeth, toileting, grooming, dressing, etc.)  - Assess/evaluate cause of self-care deficits   - Assess range of motion  - Assess patient's mobility  - Assess patient's need for assistive devices and provide as appropriate  - Encourage maximum independence but intervene and supervise when necessary  - Involve family in performance of ADLs  - Assess for home care needs following discharge   -  Consider OT consult to assist with ADL evaluation and planning for discharge  - Provide patient education as appropriate  Outcome: Progressing  Goal: Maintain proper alignment of affected body part  Description: INTERVENTIONS:  - Support, maintain and protect limb and body alignment  - Provide patient/ family with appropriate education  Outcome: Progressing     Problem: Prexisting or High Potential for Compromised Skin Integrity  Goal: Skin integrity is maintained or improved  Description: INTERVENTIONS:  - Identify patients at risk for skin breakdown  - Assess and monitor skin integrity including under and around medical devices   - Assess and monitor nutrition and hydration status  - Monitor labs  - Assess for incontinence   - Turn and reposition patient  - Assist with mobility/ambulation  - Relieve pressure over keith prominences   - Avoid friction and shearing  - Provide appropriate hygiene as needed including keeping skin clean and dry  - Evaluate need for skin moisturizer/barrier cream  - Collaborate with interdisciplinary team  - Patient/family teaching  - Consider wound care consult    Assess:  - Review Jose scale daily  - Clean and moisturize skin every shift  - Inspect skin when repositioning, toileting, and assisting with ADLS  - Assess under medical devices such as IV every shift  - Assess extremities for adequate circulation and sensation     Bed Management:  - Have minimal linens on bed & keep smooth, unwrinkled  - Change linens as needed when moist or perspiring  - Avoid sitting or lying in one position for more than 2 hours while in bed?Keep HOB at 30 degrees   - Toileting:  - Offer bedside commode  - Assess for incontinence every shift  - Use incontinent care products after each incontinent episode such as foam cleanser    Activity:  - Mobilize patient 3 times a day  - Encourage activity and walks on unit  - Encourage or provide ROM exercises   - Turn and reposition patient every 2 Hours  - Use  appropriate equipment to lift or move patient in bed  - Instruct/ Assist with weight shifting every hour when out of bed in chair  - Consider limitation of chair time 12 hour intervals    Skin Care:  - Avoid use of baby powder, tape, friction and shearing, hot water or constrictive clothing  - Relieve pressure over bony prominences using pillows  - Do not massage red bony areas    Next Steps:  - Teach patient strategies to minimize risks such as skin breakdown  - Consider consults to  interdisciplinary teams such as wound care  Outcome: Progressing     Problem: MOBILITY - ADULT  Goal: Maintain or return to baseline ADL function  Description: INTERVENTIONS:  -  Assess patient's ability to carry out ADLs; assess patient's baseline for ADL function and identify physical deficits which impact ability to perform ADLs (bathing, care of mouth/teeth, toileting, grooming, dressing, etc.)  - Assess/evaluate cause of self-care deficits   - Assess range of motion  - Assess patient's mobility; develop plan if impaired  - Assess patient's need for assistive devices and provide as appropriate  - Encourage maximum independence but intervene and supervise when necessary  - Involve family in performance of ADLs  - Assess for home care needs following discharge   - Consider OT consult to assist with ADL evaluation and planning for discharge  - Provide patient education as appropriate  - Monitor functional capacity and physical performance, use of AM PAC & JH-HLM   - Monitor gait, balance and fatigue with ambulation    Outcome: Progressing  Goal: Maintains/Returns to pre admission functional level  Description: INTERVENTIONS:  - Perform AM-PAC 6 Click Basic Mobility/ Daily Activity assessment daily.  - Set and communicate daily mobility goal to care team and patient/family/caregiver.   - Collaborate with rehabilitation services on mobility goals if consulted  - Perform Range of Motion 4 times a day.  - Reposition patient every 2  hours.  - Dangle patient 3 times a day  - Stand patient 3 times a day  - Ambulate patient 3 times a day  - Out of bed to chair 3 times a day   - Out of bed for meals 3 times a day  - Out of bed for toileting  - Record patient progress and toleration of activity level   Outcome: Progressing

## 2025-05-31 LAB
ABO GROUP BLD BPU: NORMAL
ALBUMIN SERPL BCG-MCNC: 3.8 G/DL (ref 3.5–5)
ALP SERPL-CCNC: 386 U/L (ref 34–104)
ALT SERPL W P-5'-P-CCNC: 23 U/L (ref 7–52)
ANION GAP SERPL CALCULATED.3IONS-SCNC: 6 MMOL/L (ref 4–13)
AST SERPL W P-5'-P-CCNC: 35 U/L (ref 13–39)
BILIRUB SERPL-MCNC: 10.99 MG/DL (ref 0.2–1)
BPU ID: NORMAL
BUN SERPL-MCNC: 30 MG/DL (ref 5–25)
CALCIUM SERPL-MCNC: 9.1 MG/DL (ref 8.4–10.2)
CHLORIDE SERPL-SCNC: 108 MMOL/L (ref 96–108)
CO2 SERPL-SCNC: 20 MMOL/L (ref 21–32)
CREAT SERPL-MCNC: 2.67 MG/DL (ref 0.6–1.3)
CROSSMATCH: NORMAL
ERYTHROCYTE [DISTWIDTH] IN BLOOD BY AUTOMATED COUNT: 14 % (ref 11.6–15.1)
GFR SERPL CREATININE-BSD FRML MDRD: 25 ML/MIN/1.73SQ M
GLUCOSE SERPL-MCNC: 101 MG/DL (ref 65–140)
GLUCOSE SERPL-MCNC: 120 MG/DL (ref 65–140)
GLUCOSE SERPL-MCNC: 121 MG/DL (ref 65–140)
GLUCOSE SERPL-MCNC: 125 MG/DL (ref 65–140)
GLUCOSE SERPL-MCNC: 201 MG/DL (ref 65–140)
HCT VFR BLD AUTO: 25.8 % (ref 36.5–49.3)
HGB BLD-MCNC: 8.9 G/DL (ref 12–17)
MCH RBC QN AUTO: 31.9 PG (ref 26.8–34.3)
MCHC RBC AUTO-ENTMCNC: 34.5 G/DL (ref 31.4–37.4)
MCV RBC AUTO: 93 FL (ref 82–98)
PHOSPHATE SERPL-MCNC: 3 MG/DL (ref 2.7–4.5)
PLATELET # BLD AUTO: 220 THOUSANDS/UL (ref 149–390)
PMV BLD AUTO: 11.8 FL (ref 8.9–12.7)
POTASSIUM SERPL-SCNC: 4.8 MMOL/L (ref 3.5–5.3)
PROT SERPL-MCNC: 5.9 G/DL (ref 6.4–8.4)
RBC # BLD AUTO: 2.79 MILLION/UL (ref 3.88–5.62)
SODIUM SERPL-SCNC: 134 MMOL/L (ref 135–147)
SODIUM UR-SCNC: 48 MMOL/L
UNIT DISPENSE STATUS: NORMAL
UNIT PRODUCT CODE: NORMAL
UNIT PRODUCT VOLUME: 350 ML
UNIT RH: NORMAL
WBC # BLD AUTO: 11.51 THOUSAND/UL (ref 4.31–10.16)

## 2025-05-31 PROCEDURE — 85027 COMPLETE CBC AUTOMATED: CPT | Performed by: INTERNAL MEDICINE

## 2025-05-31 PROCEDURE — 99232 SBSQ HOSP IP/OBS MODERATE 35: CPT | Performed by: INTERNAL MEDICINE

## 2025-05-31 PROCEDURE — 84100 ASSAY OF PHOSPHORUS: CPT | Performed by: INTERNAL MEDICINE

## 2025-05-31 PROCEDURE — 80053 COMPREHEN METABOLIC PANEL: CPT | Performed by: INTERNAL MEDICINE

## 2025-05-31 PROCEDURE — 84300 ASSAY OF URINE SODIUM: CPT | Performed by: INTERNAL MEDICINE

## 2025-05-31 PROCEDURE — 82948 REAGENT STRIP/BLOOD GLUCOSE: CPT

## 2025-05-31 RX ORDER — LIDOCAINE 50 MG/G
1 PATCH TOPICAL DAILY
Status: DISCONTINUED | OUTPATIENT
Start: 2025-05-31 | End: 2025-06-06 | Stop reason: HOSPADM

## 2025-05-31 RX ORDER — HYDRALAZINE HYDROCHLORIDE 25 MG/1
25 TABLET, FILM COATED ORAL EVERY 8 HOURS SCHEDULED
Status: DISCONTINUED | OUTPATIENT
Start: 2025-05-31 | End: 2025-06-06 | Stop reason: HOSPADM

## 2025-05-31 RX ORDER — ACETAMINOPHEN 10 MG/ML
1000 INJECTION, SOLUTION INTRAVENOUS ONCE
Status: COMPLETED | OUTPATIENT
Start: 2025-05-31 | End: 2025-05-31

## 2025-05-31 RX ORDER — ACETAMINOPHEN 325 MG/1
325 TABLET ORAL EVERY 6 HOURS PRN
Status: DISCONTINUED | OUTPATIENT
Start: 2025-05-31 | End: 2025-06-06 | Stop reason: HOSPADM

## 2025-05-31 RX ORDER — CARVEDILOL 6.25 MG/1
6.25 TABLET ORAL 2 TIMES DAILY WITH MEALS
Status: DISCONTINUED | OUTPATIENT
Start: 2025-05-31 | End: 2025-06-06 | Stop reason: HOSPADM

## 2025-05-31 RX ADMIN — SODIUM BICARBONATE 650 MG TABLET 650 MG: at 16:09

## 2025-05-31 RX ADMIN — HYDRALAZINE HYDROCHLORIDE 25 MG: 25 TABLET ORAL at 13:46

## 2025-05-31 RX ADMIN — DICLOFENAC SODIUM 2 G: 10 GEL TOPICAL at 20:56

## 2025-05-31 RX ADMIN — CARVEDILOL 3.12 MG: 3.12 TABLET, FILM COATED ORAL at 07:39

## 2025-05-31 RX ADMIN — LIDOCAINE 1 PATCH: 50 PATCH CUTANEOUS at 20:56

## 2025-05-31 RX ADMIN — ACETAMINOPHEN 1000 MG: 1000 INJECTION, SOLUTION INTRAVENOUS at 20:56

## 2025-05-31 RX ADMIN — Medication 100 MG: at 07:39

## 2025-05-31 RX ADMIN — CARVEDILOL 6.25 MG: 6.25 TABLET, FILM COATED ORAL at 16:09

## 2025-05-31 RX ADMIN — SODIUM BICARBONATE 650 MG TABLET 650 MG: at 07:39

## 2025-05-31 RX ADMIN — AMLODIPINE BESYLATE 5 MG: 5 TABLET ORAL at 07:39

## 2025-05-31 RX ADMIN — INSULIN ASPART 5 UNITS: 100 INJECTION, SUSPENSION SUBCUTANEOUS at 16:58

## 2025-05-31 RX ADMIN — HYDRALAZINE HYDROCHLORIDE 25 MG: 25 TABLET ORAL at 06:37

## 2025-05-31 RX ADMIN — AMLODIPINE BESYLATE 5 MG: 5 TABLET ORAL at 16:08

## 2025-05-31 RX ADMIN — SODIUM CHLORIDE 500 ML: 0.9 INJECTION, SOLUTION INTRAVENOUS at 14:37

## 2025-05-31 RX ADMIN — INSULIN ASPART 5 UNITS: 100 INJECTION, SUSPENSION SUBCUTANEOUS at 08:11

## 2025-05-31 RX ADMIN — HYDRALAZINE HYDROCHLORIDE 25 MG: 25 TABLET ORAL at 21:06

## 2025-05-31 RX ADMIN — INSULIN LISPRO 1 UNITS: 100 INJECTION, SOLUTION INTRAVENOUS; SUBCUTANEOUS at 16:58

## 2025-05-31 RX ADMIN — FOLIC ACID 1 MG: 1 TABLET ORAL at 07:39

## 2025-05-31 NOTE — ASSESSMENT & PLAN NOTE
Lab Results   Component Value Date    HGBA1C 9.6 (H) 05/26/2025   On insulin  Recommend tight glycemic control

## 2025-05-31 NOTE — PLAN OF CARE
Problem: PAIN - ADULT  Goal: Verbalizes/displays adequate comfort level or baseline comfort level  Description: Interventions:  - Encourage patient to monitor pain and request assistance  - Assess pain using appropriate pain scale  - Administer analgesics as ordered based on type and severity of pain and evaluate response  - Implement non-pharmacological measures as appropriate and evaluate response  - Consider cultural and social influences on pain and pain management  - Notify physician/advanced practitioner if interventions unsuccessful or patient reports new pain  - Educate patient/family on pain management process including their role and importance of  reporting pain   - Provide non-pharmacologic/complimentary pain relief interventions  Outcome: Progressing     Problem: INFECTION - ADULT  Goal: Absence or prevention of progression during hospitalization  Description: INTERVENTIONS:  - Assess and monitor for signs and symptoms of infection  - Monitor lab/diagnostic results  - Monitor all insertion sites, i.e. indwelling lines, tubes, and drains  - Monitor endotracheal if appropriate and nasal secretions for changes in amount and color  - [place] appropriate cooling/warming therapies per order  - Administer medications as ordered  - Instruct and encourage patient and family to use good hand hygiene technique  - Identify and instruct in appropriate isolation precautions for identified infection/condition  Outcome: Progressing  Goal: Absence of fever/infection during neutropenic period  Description: INTERVENTIONS:  - Monitor WBC  - Perform strict hand hygiene  - Limit to healthy visitors only  - No plants, dried, fresh or silk flowers with mcclure in patient room  Outcome: Progressing     Problem: SAFETY ADULT  Goal: Patient will remain free of falls  Description: INTERVENTIONS:  - Educate patient/family on patient safety including physical limitations  - Instruct patient to call for assistance with activity   -  Consider consulting OT/PT to assist with strengthening/mobility based on AM PAC & JH-HLM score  - Consult OT/PT to assist with strengthening/mobility   - Keep Call bell within reach  - Keep bed low and locked with side rails adjusted as appropriate  - Keep care items and personal belongings within reach  - Initiate and maintain comfort rounds  - Make Fall Risk Sign visible to staff  - Offer Toileting every 2 Hours, in advance of need  - Initiate/Maintain bed alarm  - Obtain necessary fall risk management equipment: alarms  - Apply yellow socks and bracelet for high fall risk patients  - Consider moving patient to room near nurses station  Outcome: Progressing  Goal: Maintain or return to baseline ADL function  Description: INTERVENTIONS:  -  Assess patient's ability to carry out ADLs; assess patient's baseline for ADL function and identify physical deficits which impact ability to perform ADLs (bathing, care of mouth/teeth, toileting, grooming, dressing, etc.)  - Assess/evaluate cause of self-care deficits   - Assess range of motion  - Assess patient's mobility; develop plan if impaired  - Assess patient's need for assistive devices and provide as appropriate  - Encourage maximum independence but intervene and supervise when necessary  - Involve family in performance of ADLs  - Assess for home care needs following discharge   - Consider OT consult to assist with ADL evaluation and planning for discharge  - Provide patient education as appropriate  - Monitor functional capacity and physical performance, use of AM PAC & JH-HLM   - Monitor gait, balance and fatigue with ambulation    Outcome: Progressing  Goal: Maintains/Returns to pre admission functional level  Description: INTERVENTIONS:  - Perform AM-PAC 6 Click Basic Mobility/ Daily Activity assessment daily.  - Set and communicate daily mobility goal to care team and patient/family/caregiver.   - Collaborate with rehabilitation services on mobility goals if  consulted  - Perform Range of Motion 4 times a day.  - Reposition patient every 2 hours.  - Dangle patient 3 times a day  - Stand patient 3 times a day  - Ambulate patient 3 times a day  - Out of bed to chair 3 times a day   - Out of bed for meals 3 times a day  - Out of bed for toileting  - Record patient progress and toleration of activity level   Outcome: Progressing     Problem: DISCHARGE PLANNING  Goal: Discharge to home or other facility with appropriate resources  Description: INTERVENTIONS:  - Identify barriers to discharge w/patient and caregiver  - Arrange for needed discharge resources and transportation as appropriate  - Identify discharge learning needs (meds, wound care, etc.)  - Arrange for interpretive services to assist at discharge as needed  - Refer to Case Management Department for coordinating discharge planning if the patient needs post-hospital services based on physician/advanced practitioner order or complex needs related to functional status, cognitive ability, or social support system  Outcome: Progressing     Problem: Knowledge Deficit  Goal: Patient/family/caregiver demonstrates understanding of disease process, treatment plan, medications, and discharge instructions  Description: Complete learning assessment and assess knowledge base.  Interventions:  - Provide teaching at level of understanding  - Provide teaching via preferred learning methods  Outcome: Progressing     Problem: GASTROINTESTINAL - ADULT  Goal: Minimal or absence of nausea and/or vomiting  Description: INTERVENTIONS:  - Administer IV fluids if ordered to ensure adequate hydration  - Maintain NPO status until nausea and vomiting are resolved  - Nasogastric tube if ordered  - Administer ordered antiemetic medications as needed  - Provide nonpharmacologic comfort measures as appropriate  - Advance diet as tolerated, if ordered  - Consider nutrition services referral to assist patient with adequate nutrition and appropriate  food choices  Outcome: Progressing  Goal: Maintains adequate nutritional intake  Description: INTERVENTIONS:  - Monitor percentage of each meal consumed  - Identify factors contributing to decreased intake, treat as appropriate  - Assist with meals as needed  - Monitor I&O, weight, and lab values if indicated  - Obtain nutrition services referral as needed  Outcome: Progressing     Problem: METABOLIC, FLUID AND ELECTROLYTES - ADULT  Goal: Electrolytes maintained within normal limits  Description: INTERVENTIONS:  - Monitor labs and assess patient for signs and symptoms of electrolyte imbalances  - Administer electrolyte replacement as ordered  - Monitor response to electrolyte replacements, including repeat lab results as appropriate  - Instruct patient on fluid and nutrition as appropriate  Outcome: Progressing  Goal: Fluid balance maintained  Description: INTERVENTIONS:  - Monitor labs   - Monitor I/O and WT  - Instruct patient on fluid and nutrition as appropriate  - Assess for signs & symptoms of volume excess or deficit  Outcome: Progressing  Goal: Glucose maintained within target range  Description: INTERVENTIONS:  - Monitor Blood Glucose as ordered  - Assess for signs and symptoms of hyperglycemia and hypoglycemia  - Administer ordered medications to maintain glucose within target range  - Assess nutritional intake and initiate nutrition service referral as needed  Outcome: Progressing     Problem: SKIN/TISSUE INTEGRITY - ADULT  Goal: Skin Integrity remains intact(Skin Breakdown Prevention)  Description: Assess:  -Perform Jose assessment every shift  -Clean and moisturize skin every shift  -Inspect skin when repositioning, toileting, and assisting with ADLS  -Assess under medical devices such as IV every shift  -Assess extremities for adequate circulation and sensation     Bed Management:  -Have minimal linens on bed & keep smooth, unwrinkled  -Change linens as needed when moist or perspiring  -Avoid sitting  or lying in one position for more than 2 hours while in bed  -Keep HOB at 30 degrees     Toileting:  -Offer bedside commode  -Assess for incontinence every shift  -Use incontinent care products after each incontinent episode such as foam cleanser    Activity:  -Mobilize patient 3 times a day  -Encourage activity and walks on unit  -Encourage or provide ROM exercises   -Turn and reposition patient every 2 Hours  -Use appropriate equipment to lift or move patient in bed  -Instruct/ Assist with weight shifting every hour when out of bed in chair  -Consider limitation of chair time 1 hour intervals    Skin Care:  -Avoid use of baby powder, tape, friction and shearing, hot water or constrictive clothing  -Relieve pressure over bony prominences using pillows  -Do not massage red bony areas    Next Steps:  -Teach patient strategies to minimize risks such as skin breakdown   -Consider consults to  interdisciplinary teams such as wound care  Outcome: Progressing     Problem: HEMATOLOGIC - ADULT  Goal: Maintains hematologic stability  Description: INTERVENTIONS  - Assess for signs and symptoms of bleeding or hemorrhage  - Monitor labs  - Administer supportive blood products/factors as ordered and appropriate  Outcome: Progressing     Problem: MUSCULOSKELETAL - ADULT  Goal: Maintain or return mobility to safest level of function  Description: INTERVENTIONS:  - Assess patient's ability to carry out ADLs; assess patient's baseline for ADL function and identify physical deficits which impact ability to perform ADLs (bathing, care of mouth/teeth, toileting, grooming, dressing, etc.)  - Assess/evaluate cause of self-care deficits   - Assess range of motion  - Assess patient's mobility  - Assess patient's need for assistive devices and provide as appropriate  - Encourage maximum independence but intervene and supervise when necessary  - Involve family in performance of ADLs  - Assess for home care needs following discharge   -  Consider OT consult to assist with ADL evaluation and planning for discharge  - Provide patient education as appropriate  Outcome: Progressing  Goal: Maintain proper alignment of affected body part  Description: INTERVENTIONS:  - Support, maintain and protect limb and body alignment  - Provide patient/ family with appropriate education  Outcome: Progressing     Problem: Prexisting or High Potential for Compromised Skin Integrity  Goal: Skin integrity is maintained or improved  Description: INTERVENTIONS:  - Identify patients at risk for skin breakdown  - Assess and monitor skin integrity including under and around medical devices   - Assess and monitor nutrition and hydration status  - Monitor labs  - Assess for incontinence   - Turn and reposition patient  - Assist with mobility/ambulation  - Relieve pressure over keith prominences   - Avoid friction and shearing  - Provide appropriate hygiene as needed including keeping skin clean and dry  - Evaluate need for skin moisturizer/barrier cream  - Collaborate with interdisciplinary team  - Patient/family teaching  - Consider wound care consult    Assess:  - Review Jose scale daily  - Clean and moisturize skin every shift  - Inspect skin when repositioning, toileting, and assisting with ADLS  - Assess under medical devices such as IV every shift  - Assess extremities for adequate circulation and sensation     Bed Management:  - Have minimal linens on bed & keep smooth, unwrinkled  - Change linens as needed when moist or perspiring  - Avoid sitting or lying in one position for more than 2 hours while in bed?Keep HOB at 30 degrees   - Toileting:  - Offer bedside commode  - Assess for incontinence every shift  - Use incontinent care products after each incontinent episode such as foam cleanser    Activity:  - Mobilize patient 3 times a day  - Encourage activity and walks on unit  - Encourage or provide ROM exercises   - Turn and reposition patient every 2 Hours  - Use  appropriate equipment to lift or move patient in bed  - Instruct/ Assist with weight shifting every hour when out of bed in chair  - Consider limitation of chair time 12 hour intervals    Skin Care:  - Avoid use of baby powder, tape, friction and shearing, hot water or constrictive clothing  - Relieve pressure over bony prominences using pillows  - Do not massage red bony areas    Next Steps:  - Teach patient strategies to minimize risks such as skin breakdown  - Consider consults to  interdisciplinary teams such as wound care  Outcome: Progressing     Problem: MOBILITY - ADULT  Goal: Maintain or return to baseline ADL function  Description: INTERVENTIONS:  -  Assess patient's ability to carry out ADLs; assess patient's baseline for ADL function and identify physical deficits which impact ability to perform ADLs (bathing, care of mouth/teeth, toileting, grooming, dressing, etc.)  - Assess/evaluate cause of self-care deficits   - Assess range of motion  - Assess patient's mobility; develop plan if impaired  - Assess patient's need for assistive devices and provide as appropriate  - Encourage maximum independence but intervene and supervise when necessary  - Involve family in performance of ADLs  - Assess for home care needs following discharge   - Consider OT consult to assist with ADL evaluation and planning for discharge  - Provide patient education as appropriate  - Monitor functional capacity and physical performance, use of AM PAC & JH-HLM   - Monitor gait, balance and fatigue with ambulation    Outcome: Progressing  Goal: Maintains/Returns to pre admission functional level  Description: INTERVENTIONS:  - Perform AM-PAC 6 Click Basic Mobility/ Daily Activity assessment daily.  - Set and communicate daily mobility goal to care team and patient/family/caregiver.   - Collaborate with rehabilitation services on mobility goals if consulted  - Perform Range of Motion 4 times a day.  - Reposition patient every 2  hours.  - Dangle patient 3 times a day  - Stand patient 3 times a day  - Ambulate patient 3 times a day  - Out of bed to chair 3 times a day   - Out of bed for meals 3 times a day  - Out of bed for toileting  - Record patient progress and toleration of activity level   Outcome: Progressing

## 2025-05-31 NOTE — PROGRESS NOTES
Progress Note - Hospitalist   Name: Wes Araujo 55 y.o. male I MRN: [id number]  Unit/Bed#: [place] [address] -01 I Date of Admission: 5/23/2025   Date of Service: 5/31/2025 I Hospital Day: 8    Assessment & Plan  Toxic metabolic encephalopathy  History of atrial fibrillation hypertension alcohol liver disease who presented to the hospital for change in mental status found to have hyperglycemia  Admitted to ICU and stabilized  GI consulted for liver injury/failure.  Father reports active consumption of alcohol  Mentation much improved and near baseline  Acute renal failure superimposed on stage 3 chronic kidney disease (HCC)  Acute kidney injury on CKD 3 baseline creatinine approximately 1.5-2.0  Cr today is slightly higher than yesterday  Related to progressive liver dysfunction and dehydration  Nephrology following: Completed albumin challenge.  Increased Coreg to 6.25.  Ordered IV  cc bolus.        Results from last 7 days   Lab Units 05/31/25  0508 05/30/25  0533 05/29/25  0459 05/28/25  0508 05/27/25  0430 05/26/25  0541 05/24/25  1639   BUN mg/dL 30* 24 21 22 21 16 12   CREATININE mg/dL 2.67* 2.55* 2.41* 2.53* 2.47* 2.23* 1.72*   EGFR ml/min/1.73sq m 25 27 29 27 28 31 43     Acute liver failure  Evidence of worsening hyperbilirubinemia and coagulopathy  Being followed by gastroenterology.  Discriminant function does not meet criteria to initiate steroids for possible alcoholic hepatitis  Ruled out infection: Cultures no growth    Results from last 7 days   Lab Units 05/31/25  0508 05/30/25  0533 05/29/25  0459 05/28/25  0508 05/27/25  0430 05/26/25  0541 05/25/25  0450   AST U/L 35 35 33 43* 49* 51* 60*   ALT U/L 23 19 22 27 33 34 37   TOTAL BILIRUBIN mg/dL 10.99* 7.84* 7.91* 7.26* 7.14* 3.61* 3.17*     Results from last 7 days   Lab Units 05/30/25  0533 05/28/25  0508 05/27/25  0430 05/26/25  0541   INR  1.07 1.06 1.11 5.53*     Other specified anemias  No evidence of blood loss.  Transfused 1 unit PRBC  5/28, being transfused additional unit today.  EGD and colonoscopy February with dieulafoy lesion but no other sources of bleeding    Results from last 7 days   Lab Units 05/31/25  0508 05/30/25  0533 05/29/25  0459 05/28/25  0508 05/27/25  0430 05/26/25  0541 05/25/25  0450   HEMOGLOBIN g/dL 8.9* 6.7* 7.2* 6.5* 7.2* 8.3* 9.5*     Primary hypertension  Continue amlodipine 5 mg twice daily, hydralazine 25 mg 3 times daily  Nephrology added carvedilol  Type 2 diabetes mellitus with hyperglycemia, with long-term current use of insulin ([facility])  Lab Results   Component Value Date    HGBA1C 9.6 (H) 05/26/2025     Recent Labs     05/30/25  1635 05/30/25  2107 05/31/25  0714 05/31/25  1122   POCGLU 167* 129 125 121     HHNK upon arrival likely related to noncompliance with medications and alcohol use  Restarted on 70/30 5 units twice daily did have hypoglycemia today.  Continue sliding scale  Alcohol abuse  History of alcohol use with withdrawal and withdrawal seizures  Father reported active consumption of alcohol  Continue thiamine and folic acid  [place]  needed   [id number] used today  History of atrial fibrillation  History of atrial fibrillation on metoprolol  Not on anticoagulation at baseline  Electrolyte abnormality  Hyponatremia secondary to hyperglycemia and progressive liver disease    Results from last 7 days   Lab Units 05/31/25  0508 05/30/25  0533 05/29/25  0459 05/28/25  0508   SODIUM mmol/L 134* 137 135 134*       VTE Pharmacologic Prophylaxis:   Moderate Risk (Score 3-4) - Pharmacological DVT Prophylaxis Contraindicated. Sequential Compression Devices Ordered.    Mobility:   Basic Mobility Inpatient Raw Score: 21  JH-HLM Goal: 6: Walk 10 steps or more  JH-HLM Achieved: 8: Walk 250 feet ot more  JH-HLM Goal achieved. Continue to encourage appropriate mobility.    Patient Centered Rounds: I performed bedside rounds with nursing staff today.   Discussions with Specialists or Other  Care Team Provider: Discussed with RN    Education and Discussions with Family / Patient:     Current Length of Stay: 8 day(s)  Current Patient Status: Inpatient   Certification Statement: The patient will continue to require additional inpatient hospital stay due to ZENAIDA  Discharge Plan: Anticipate discharge in 48 hrs to alcohol rehab    Code Status: Level 1 - Full Code    Subjective:      # [id number]    Patient was seen and examined at bedside. The patient has no new complaint.  No acute overnight changes.    Objective:     Vitals:   Temp (24hrs), Av.2 °F (36.8 °C), Min:98 °F (36.7 °C), Max:98.4 °F (36.9 °C)    Temp:  [98 °F (36.7 °C)-98.4 °F (36.9 °C)] 98.4 °F (36.9 °C)  HR:  [77-87] 78  Resp:  [16-20] 18  BP: (125-173)/(68-85) 169/83  SpO2:  [97 %-98 %] 98 %  Body mass index is 19.53 kg/m².     Input and Output Summary (last 24 hours):       Intake/Output Summary (Last 24 hours) at 2025 1312  Last data filed at 2025 0900  Gross per 24 hour   Intake 1213.33 ml   Output 1000 ml   Net 213.33 ml       Physical Exam:     Physical Exam  General: no acute distress  HEENT: NC/AT, PERRL, EOM - normal  Neck: Supple  Pulm/Chest: Normal chest wall expansion, decreased breath sounds on both side  CVS: normal S1&S2, capillary refill <2s  Abd: soft, non tender, non distended, bowel sounds +  MSK: move all 4 extremities spontaneously  Skin: warm, jaundice  CNS: no acute focal neuro deficit      Additional Data:     Labs:    Results from last 7 days   Lab Units 25  0508 25  0541 25  0450   WBC Thousand/uL 11.51*   < > 19.15*   HEMOGLOBIN g/dL 8.9*   < > 9.5*   HEMATOCRIT % 25.8*   < > 27.4*   PLATELETS Thousands/uL 220   < > 306   LYMPHO PCT %  --   --  13*   MONO PCT %  --   --  4   EOS PCT %  --   --  1    < > = values in this interval not displayed.     Results from last 7 days   Lab Units 25  0508   POTASSIUM mmol/L 4.8   CHLORIDE mmol/L 108   CO2 mmol/L 20*   BUN mg/dL 30*    CREATININE mg/dL 2.67*   CALCIUM mg/dL 9.1   ALK PHOS U/L 386*   ALT U/L 23   AST U/L 35     Results from last 7 days   Lab Units 05/30/25  0533   INR  1.07       * I Have Reviewed All Lab Data Listed Above.  * Additional Pertinent Lab Tests Reviewed: All Labs For Current Hospital Admission Reviewed    Imaging:      I have reviewed pertinent imaging.      Recent Cultures (last 7 days):     Results from last 7 days   Lab Units 05/26/25  1742   BLOOD CULTURE  No Growth After 4 Days.  No Growth After 4 Days.       Last 24 Hours Medication List:   Current Facility-Administered Medications   Medication Dose Route Frequency Provider Last Rate    amLODIPine  5 mg Oral BID Bibi Solis MD      carvedilol  3.125 mg Oral BID With Meals Bibi Solis MD      folic acid  1 mg Oral Daily Bala Leroy DO      hydrALAZINE  10 mg Intravenous Q4H PRN Veronica Phyllissolis Petersen, THAONP      hydrALAZINE  25 mg Oral Q8H JAISON Veronica Phyllissolis Petersen, THAONP      insulin aspart protamine-insulin aspart  5 Units Subcutaneous BID AC Veronica Phyllis Petersen, CRNP      insulin lispro  1-5 Units Subcutaneous TID AC Veronica Phyllis Petersen, CRNP      insulin lispro  1-5 Units Subcutaneous HS Veronica Phyllissolis Petersen, CRNP      ondansetron  4 mg Intravenous Q4H PRN Bala Leroy,       sodium bicarbonate  650 mg Oral BID after meals Bibi Solis MD      thiamine  100 mg Oral Daily Bala Leroy DO          Today, Patient Was Seen By: Liyah Juan MD    ** Please Note: Dragon 360 Dictation voice to text software may have been used in the creation of this document. **

## 2025-05-31 NOTE — ASSESSMENT & PLAN NOTE
Evidence of worsening hyperbilirubinemia and coagulopathy  Being followed by gastroenterology.  Discriminant function does not meet criteria to initiate steroids for possible alcoholic hepatitis  Ruled out infection: Cultures no growth    Results from last 7 days   Lab Units 05/31/25  0508 05/30/25  0533 05/29/25  0459 05/28/25  0508 05/27/25  0430 05/26/25  0541 05/25/25  0450   AST U/L 35 35 33 43* 49* 51* 60*   ALT U/L 23 19 22 27 33 34 37   TOTAL BILIRUBIN mg/dL 10.99* 7.84* 7.91* 7.26* 7.14* 3.61* 3.17*     Results from last 7 days   Lab Units 05/30/25  0533 05/28/25  0508 05/27/25  0430 05/26/25  0541   INR  1.07 1.06 1.11 5.53*

## 2025-05-31 NOTE — ASSESSMENT & PLAN NOTE
Lab Results   Component Value Date    HGBA1C 9.6 (H) 05/26/2025     Recent Labs     05/30/25  1635 05/30/25  2107 05/31/25  0714 05/31/25  1122   POCGLU 167* 129 125 121     HHNK upon arrival likely related to noncompliance with medications and alcohol use  Restarted on 70/30 5 units twice daily did have hypoglycemia today.  Continue sliding scale

## 2025-05-31 NOTE — ASSESSMENT & PLAN NOTE
Transfuse as needed, s/p PRBC already this admission  Maintain hemoglobin greater than 8 grams/deciliter  Follow-up with primary team

## 2025-05-31 NOTE — RESTORATIVE TECHNICIAN NOTE
Restorative Technician Note      Patient Name: Wes Araujo     Restorative Tech Visit Date: 05/31/25  Note Type: Mobility  Patient Position Upon Consult: Supine  Activity Performed: Ambulated  Assistive Device: Roller walker  Patient Position at End of Consult: Bedside chair; All needs within reach; Bed/Chair alarm activated            ns

## 2025-05-31 NOTE — ASSESSMENT & PLAN NOTE
Acute kidney injury on CKD 3 baseline creatinine approximately 1.5-2.0  Cr today is slightly higher than yesterday  Related to progressive liver dysfunction and dehydration  Nephrology following: Completed albumin challenge.  Increased Coreg to 6.25.  Ordered IV  cc bolus.        Results from last 7 days   Lab Units 05/31/25  0508 05/30/25  0533 05/29/25  0459 05/28/25  0508 05/27/25  0430 05/26/25  0541 05/24/25  1639   BUN mg/dL 30* 24 21 22 21 16 12   CREATININE mg/dL 2.67* 2.55* 2.41* 2.53* 2.47* 2.23* 1.72*   EGFR ml/min/1.73sq m 25 27 29 27 28 31 43

## 2025-05-31 NOTE — PLAN OF CARE
Problem: SAFETY ADULT  Goal: Patient will remain free of falls  Description: INTERVENTIONS:  - Educate patient/family on patient safety including physical limitations  - Instruct patient to call for assistance with activity   - Consider consulting OT/PT to assist with strengthening/mobility based on AM PAC & JH-HLM score  - Consult OT/PT to assist with strengthening/mobility   - Keep Call bell within reach  - Keep bed low and locked with side rails adjusted as appropriate  - Keep care items and personal belongings within reach  - Initiate and maintain comfort rounds  - Make Fall Risk Sign visible to staff  - Offer Toileting every 2 Hours, in advance of need  - Initiate/Maintain bed alarm  - Obtain necessary fall risk management equipment: alarms  - Apply yellow socks and bracelet for high fall risk patients  - Consider moving patient to room near nurses station  Outcome: Progressing  Goal: Maintain or return to baseline ADL function  Description: INTERVENTIONS:  -  Assess patient's ability to carry out ADLs; assess patient's baseline for ADL function and identify physical deficits which impact ability to perform ADLs (bathing, care of mouth/teeth, toileting, grooming, dressing, etc.)  - Assess/evaluate cause of self-care deficits   - Assess range of motion  - Assess patient's mobility; develop plan if impaired  - Assess patient's need for assistive devices and provide as appropriate  - Encourage maximum independence but intervene and supervise when necessary  - Involve family in performance of ADLs  - Assess for home care needs following discharge   - Consider OT consult to assist with ADL evaluation and planning for discharge  - Provide patient education as appropriate  - Monitor functional capacity and physical performance, use of AM PAC & JH-HLM   - Monitor gait, balance and fatigue with ambulation    Outcome: Progressing  Goal: Maintains/Returns to pre admission functional level  Description: INTERVENTIONS:  -  Perform AM-PAC 6 Click Basic Mobility/ Daily Activity assessment daily.  - Set and communicate daily mobility goal to care team and patient/family/caregiver.   - Collaborate with rehabilitation services on mobility goals if consulted  - Perform Range of Motion 4 times a day.  - Reposition patient every 2 hours.  - Dangle patient 3 times a day  - Stand patient 3 times a day  - Ambulate patient 3 times a day  - Out of bed to chair 3 times a day   - Out of bed for meals 3 times a day  - Out of bed for toileting  - Record patient progress and toleration of activity level   Outcome: Progressing     Problem: DISCHARGE PLANNING  Goal: Discharge to home or other facility with appropriate resources  Description: INTERVENTIONS:  - Identify barriers to discharge w/patient and caregiver  - Arrange for needed discharge resources and transportation as appropriate  - Identify discharge learning needs (meds, wound care, etc.)  - Arrange for interpretive services to assist at discharge as needed  - Refer to Case Management Department for coordinating discharge planning if the patient needs post-hospital services based on physician/advanced practitioner order or complex needs related to functional status, cognitive ability, or social support system  Outcome: Progressing     Problem: Knowledge Deficit  Goal: Patient/family/caregiver demonstrates understanding of disease process, treatment plan, medications, and discharge instructions  Description: Complete learning assessment and assess knowledge base.  Interventions:  - Provide teaching at level of understanding  - Provide teaching via preferred learning methods  Outcome: Progressing     Problem: GASTROINTESTINAL - ADULT  Goal: Minimal or absence of nausea and/or vomiting  Description: INTERVENTIONS:  - Administer IV fluids if ordered to ensure adequate hydration  - Maintain NPO status until nausea and vomiting are resolved  - Nasogastric tube if ordered  - Administer ordered  antiemetic medications as needed  - Provide nonpharmacologic comfort measures as appropriate  - Advance diet as tolerated, if ordered  - Consider nutrition services referral to assist patient with adequate nutrition and appropriate food choices  Outcome: Progressing  Goal: Maintains adequate nutritional intake  Description: INTERVENTIONS:  - Monitor percentage of each meal consumed  - Identify factors contributing to decreased intake, treat as appropriate  - Assist with meals as needed  - Monitor I&O, weight, and lab values if indicated  - Obtain nutrition services referral as needed  Outcome: Progressing     Problem: METABOLIC, FLUID AND ELECTROLYTES - ADULT  Goal: Electrolytes maintained within normal limits  Description: INTERVENTIONS:  - Monitor labs and assess patient for signs and symptoms of electrolyte imbalances  - Administer electrolyte replacement as ordered  - Monitor response to electrolyte replacements, including repeat lab results as appropriate  - Instruct patient on fluid and nutrition as appropriate  Outcome: Progressing  Goal: Fluid balance maintained  Description: INTERVENTIONS:  - Monitor labs   - Monitor I/O and WT  - Instruct patient on fluid and nutrition as appropriate  - Assess for signs & symptoms of volume excess or deficit  Outcome: Progressing  Goal: Glucose maintained within target range  Description: INTERVENTIONS:  - Monitor Blood Glucose as ordered  - Assess for signs and symptoms of hyperglycemia and hypoglycemia  - Administer ordered medications to maintain glucose within target range  - Assess nutritional intake and initiate nutrition service referral as needed  Outcome: Progressing     Problem: HEMATOLOGIC - ADULT  Goal: Maintains hematologic stability  Description: INTERVENTIONS  - Assess for signs and symptoms of bleeding or hemorrhage  - Monitor labs  - Administer supportive blood products/factors as ordered and appropriate  Outcome: Progressing     Problem: MUSCULOSKELETAL -  ADULT  Goal: Maintain or return mobility to safest level of function  Description: INTERVENTIONS:  - Assess patient's ability to carry out ADLs; assess patient's baseline for ADL function and identify physical deficits which impact ability to perform ADLs (bathing, care of mouth/teeth, toileting, grooming, dressing, etc.)  - Assess/evaluate cause of self-care deficits   - Assess range of motion  - Assess patient's mobility  - Assess patient's need for assistive devices and provide as appropriate  - Encourage maximum independence but intervene and supervise when necessary  - Involve family in performance of ADLs  - Assess for home care needs following discharge   - Consider OT consult to assist with ADL evaluation and planning for discharge  - Provide patient education as appropriate  Outcome: Progressing  Goal: Maintain proper alignment of affected body part  Description: INTERVENTIONS:  - Support, maintain and protect limb and body alignment  - Provide patient/ family with appropriate education  Outcome: Progressing     Problem: MOBILITY - ADULT  Goal: Maintain or return to baseline ADL function  Description: INTERVENTIONS:  -  Assess patient's ability to carry out ADLs; assess patient's baseline for ADL function and identify physical deficits which impact ability to perform ADLs (bathing, care of mouth/teeth, toileting, grooming, dressing, etc.)  - Assess/evaluate cause of self-care deficits   - Assess range of motion  - Assess patient's mobility; develop plan if impaired  - Assess patient's need for assistive devices and provide as appropriate  - Encourage maximum independence but intervene and supervise when necessary  - Involve family in performance of ADLs  - Assess for home care needs following discharge   - Consider OT consult to assist with ADL evaluation and planning for discharge  - Provide patient education as appropriate  - Monitor functional capacity and physical performance, use of AM PAC & JH-HLM   -  Monitor gait, balance and fatigue with ambulation    Outcome: Progressing  Goal: Maintains/Returns to pre admission functional level  Description: INTERVENTIONS:  - Perform AM-PAC 6 Click Basic Mobility/ Daily Activity assessment daily.  - Set and communicate daily mobility goal to care team and patient/family/caregiver.   - Collaborate with rehabilitation services on mobility goals if consulted  - Perform Range of Motion 4 times a day.  - Reposition patient every 2 hours.  - Dangle patient 3 times a day  - Stand patient 3 times a day  - Ambulate patient 3 times a day  - Out of bed to chair 3 times a day   - Out of bed for meals 3 times a day  - Out of bed for toileting  - Record patient progress and toleration of activity level   Outcome: Progressing

## 2025-05-31 NOTE — ASSESSMENT & PLAN NOTE
Hyponatremia secondary to hyperglycemia and progressive liver disease    Results from last 7 days   Lab Units 05/31/25  0508 05/30/25  0533 05/29/25  0459 05/28/25  0508   SODIUM mmol/L 134* 137 135 134*

## 2025-05-31 NOTE — ASSESSMENT & PLAN NOTE
ZENAIDA most likely secondary to prerenal azotemia secondary to osmotic diuresis plus some component of intravascular volume depletion in light of hypoalbuminemia with subsequent ATN versus HRS in the presence of acute liver injury  After review of records it appears that the patient has a baseline Creatinine of 1.5-2.0 mg/dL.  Admission creatinine of 1.99 mg/dL on 5/23.  Creatinine slightly worse today to 2.67 - continue to monitor and trend  Give 500ml IVF bolus  Hold further albumin  Discussed with patient we will have to wait a period of 3 months to see where new baseline creatinine would be  Await renal recovery.  CT abdomen with contrast/26/25 no hydronephrosis unremarkable kidneys  Strict I/O.  Daily weights  Urinary retention protocol  Avoid nephrotoxins, adjust meds to appropriate GFR.

## 2025-05-31 NOTE — ASSESSMENT & PLAN NOTE
BP remains elevated  Optimize hemodynamic status to avoid renal ischemic injury.  Avoid BP fluctuations.  Home medications: Hydralazine 25 mg p.o. every 8, Norvasc 5 mg p.o. twice daily  Current medications: coreg 3.125mg BID, Norvasc 5 mg p.o. twice daily, hydralazine 25 mg p.o. every 8h  Increase coreg to 6.25mg BID 5/31

## 2025-05-31 NOTE — PROGRESS NOTES
Progress Note - Nephrology   Name: Wes Araujo 55 y.o. male I MRN: [id number]  Unit/Bed#: [place] [address] -01 I Date of Admission: 5/23/2025   Date of Service: 5/31/2025 I Hospital Day: 8     Assessment & Plan  Acute kidney injury (HCC)  ZENAIDA most likely secondary to prerenal azotemia secondary to osmotic diuresis plus some component of intravascular volume depletion in light of hypoalbuminemia with subsequent ATN versus HRS in the presence of acute liver injury  After review of records it appears that the patient has a baseline Creatinine of 1.5-2.0 mg/dL.  Admission creatinine of 1.99 mg/dL on 5/23.  Creatinine slightly worse today to 2.67 - continue to monitor and trend  Give 500ml IVF bolus  Hold further albumin  Discussed with patient we will have to wait a period of 3 months to see where new baseline creatinine would be  Await renal recovery.  CT abdomen with contrast/26/25 no hydronephrosis unremarkable kidneys  Strict I/O.  Daily weights  Urinary retention protocol  Avoid nephrotoxins, adjust meds to appropriate GFR.  CKD stage 3b, GFR 30-44 ml/min ([facility])  Lab Results   Component Value Date    EGFR 25 05/31/2025    EGFR 27 05/30/2025    EGFR 29 05/29/2025    CREATININE 2.67 (H) 05/31/2025    CREATININE 2.55 (H) 05/30/2025    CREATININE 2.41 (H) 05/29/2025   After review of records it appears that the patient has a baseline Creatinine of 1.5-2.0 mg/dL.  Avoid nephrotoxins, adjust meds to appropriate GFR.  Likely has underlying CKD secondary to poorly controlled diabetes plus hypertensive nephrosclerosis plus age-related nephron loss  Not seen by nephrology since 2019  Hyponatremia  Down from yesterday  Will give IVF bolus 500ml and monitor   Check BMP in a.m.  Primary hypertension  BP remains elevated  Optimize hemodynamic status to avoid renal ischemic injury.  Avoid BP fluctuations.  Home medications: Hydralazine 25 mg p.o. every 8, Norvasc 5 mg p.o. twice daily  Current medications: coreg 3.125mg BID,  Norvasc 5 mg p.o. twice daily, hydralazine 25 mg p.o. every 8h  Increase coreg to 6.25mg BID 5/31  Type 2 diabetes mellitus with hyperglycemia, with long-term current use of insulin (HCC)  Lab Results   Component Value Date    HGBA1C 9.6 (H) 05/26/2025   On insulin  Recommend tight glycemic control  Alcohol abuse  Monitor for withdrawal  Alcohol rehab on discharge  History of atrial fibrillation  Follow-up with cardiology  Acute liver failure  Follow-up with GI  Rising LFTs and bilirubin levels  Other specified anemias  Transfuse as needed, s/p PRBC already this admission  Maintain hemoglobin greater than 8 grams/deciliter  Follow-up with primary team  Hyperkalemia  improving  Placed on low potassium diet  Check BMP in a.m.  Metabolic acidosis  Improving with bicarbonate tabs started 5/30    I have reviewed the nephrology recommendations including increase in coreg, with SLIM attending, and we are in agreement with renal plan including the information outlined above. Nephrology service will follow.    Subjective   Patient denies acute complaints including sob, abdominal pain, changes in appetite.      [id number] utilized.     Objective :  Temp:  [98 °F (36.7 °C)-98.4 °F (36.9 °C)] 98.4 °F (36.9 °C)  HR:  [77-87] 78  BP: (125-173)/(68-85) 169/83  Resp:  [16-20] 18  SpO2:  [97 %-98 %] 98 %  O2 Device: None (Room air)    Current Weight: Weight - Scale: 60 kg (132 lb 4.4 oz)  First Weight: Weight - Scale: 55.4 kg (122 lb 2.2 oz)  I/O         05/29 0701 05/30 0700 05/30 0701  05/31 0700 05/31 0701  06/01 0700    P.O. 1560 1218 600    Blood  353.3     Total Intake(mL/kg) 1560 (25.7) 1571.3 (26.2) 600 (10)    Urine (mL/kg/hr) 700 (0.5) 1200 (0.8)     Total Output 700 1200     Net +860 +371.3 +600           Unmeasured Urine Occurrence 1 x  1 x          Physical Exam  General: NAD  Neuro: alert awake  Psych: mood and affect appropriate  Skin: no rash  Eyes: anicteric  ENMT: mm moist  Neck: no  masses  Respiratory: ctab  Cardiovascular: rrr  Extremities: no edema  Gastrointestinal: soft nt nd    Medications:  Current Medications[1]      Lab Results: I have reviewed the following results:  Results from last 7 days   Lab Units 05/31/25  0508 05/30/25  0533 05/29/25  0459 05/28/25  0508 05/27/25  0430 05/26/25  0541 05/25/25  0450 05/24/25  1639   WBC Thousand/uL 11.51* 9.68 9.70 9.29 11.67* 12.21* 19.15*  --    HEMOGLOBIN g/dL 8.9* 6.7* 7.2* 6.5* 7.2* 8.3* 9.5*  --    HEMATOCRIT % 25.8* 19.7* 20.9* 19.5* 21.8* 25.0* 27.4*  --    PLATELETS Thousands/uL 220 208 219 243 261 279 306  --    POTASSIUM mmol/L 4.8 5.3 5.6* 5.1 5.0 5.3  --  4.9   CHLORIDE mmol/L 108 108 109* 109* 105 104  --  101   CO2 mmol/L 20* 16* 21 21 21 24  --  22   BUN mg/dL 30* 24 21 22 21 16  --  12   CREATININE mg/dL 2.67* 2.55* 2.41* 2.53* 2.47* 2.23*  --  1.72*   CALCIUM mg/dL 9.1 8.5 9.0 8.5 8.1* 8.3*  --  8.4   MAGNESIUM mg/dL  --   --   --  2.3  --  2.5 2.6  --    PHOSPHORUS mg/dL 3.0  --  2.4* 2.8  --  4.6* 4.7*  --    ALBUMIN g/dL 3.8 3.7 3.7 3.0* 2.5* 2.4* 2.5*  --           [1]   Current Facility-Administered Medications:     amLODIPine (NORVASC) tablet 5 mg, 5 mg, Oral, BID, Bibi Solis MD, 5 mg at 05/31/25 0739    carvedilol (COREG) tablet 3.125 mg, 3.125 mg, Oral, BID With Meals, Bibi Solis MD, 3.125 mg at 05/31/25 0739    folic acid (FOLVITE) tablet 1 mg, 1 mg, Oral, Daily, Bala Leroy DO, 1 mg at 05/31/25 0739    hydrALAZINE (APRESOLINE) injection 10 mg, 10 mg, Intravenous, Q4H PRN, ARIANE Leggett, 10 mg at 05/25/25 0318    hydrALAZINE (APRESOLINE) tablet 25 mg, 25 mg, Oral, Q8H JAISON, ARIANE Leggett, 25 mg at 05/31/25 1346    insulin aspart protamine-insulin aspart (NovoLOG 70/30) 100 units/mL subcutaneous injection 5 Units, 5 Units, Subcutaneous, BID AC, ARIANE Leggett, 5 Units at 05/31/25 0811    insulin lispro (HumALOG/ADMELOG) 100 units/mL subcutaneous injection 1-5 Units, 1-5 Units,  Subcutaneous, TID AC, 1 Units at 05/30/25 1717 **AND** Fingerstick Glucose (POCT), , , TID AC, ARIANE Leggett    insulin lispro (HumALOG/ADMELOG) 100 units/mL subcutaneous injection 1-5 Units, 1-5 Units, Subcutaneous, HS, ARIANE Leggett, 1 Units at 05/29/25 2233    ondansetron (ZOFRAN) injection 4 mg, 4 mg, Intravenous, Q4H PRN, Bala Leroy DO    sodium bicarbonate tablet 650 mg, 650 mg, Oral, BID after meals, Bibi Solis MD, 650 mg at 05/31/25 0739    thiamine tablet 100 mg, 100 mg, Oral, Daily, Bala Leroy DO, 100 mg at 05/31/25 0769

## 2025-05-31 NOTE — ASSESSMENT & PLAN NOTE
No evidence of blood loss.  Transfused 1 unit PRBC 5/28, being transfused additional unit today.  EGD and colonoscopy February with dieulafoy lesion but no other sources of bleeding    Results from last 7 days   Lab Units 05/31/25  0508 05/30/25  0533 05/29/25  0459 05/28/25  0508 05/27/25  0430 05/26/25  0541 05/25/25  0450   HEMOGLOBIN g/dL 8.9* 6.7* 7.2* 6.5* 7.2* 8.3* 9.5*

## 2025-05-31 NOTE — ASSESSMENT & PLAN NOTE
Lab Results   Component Value Date    EGFR 25 05/31/2025    EGFR 27 05/30/2025    EGFR 29 05/29/2025    CREATININE 2.67 (H) 05/31/2025    CREATININE 2.55 (H) 05/30/2025    CREATININE 2.41 (H) 05/29/2025   After review of records it appears that the patient has a baseline Creatinine of 1.5-2.0 mg/dL.  Avoid nephrotoxins, adjust meds to appropriate GFR.  Likely has underlying CKD secondary to poorly controlled diabetes plus hypertensive nephrosclerosis plus age-related nephron loss  Not seen by nephrology since 2019

## 2025-06-01 ENCOUNTER — APPOINTMENT (INPATIENT)
Dept: RADIOLOGY | Facility: HOSPITAL | Age: 55
DRG: 280 | End: 2025-06-01
Payer: COMMERCIAL

## 2025-06-01 ENCOUNTER — APPOINTMENT (INPATIENT)
Dept: ULTRASOUND IMAGING | Facility: HOSPITAL | Age: 55
DRG: 280 | End: 2025-06-01
Payer: COMMERCIAL

## 2025-06-01 PROBLEM — R79.89 ABNORMAL LFTS: Status: ACTIVE | Noted: 2025-05-23

## 2025-06-01 PROBLEM — M54.42 CHRONIC BILATERAL LOW BACK PAIN WITH BILATERAL SCIATICA: Status: ACTIVE | Noted: 2025-06-01

## 2025-06-01 PROBLEM — M54.41 CHRONIC BILATERAL LOW BACK PAIN WITH BILATERAL SCIATICA: Status: ACTIVE | Noted: 2025-06-01

## 2025-06-01 PROBLEM — G89.29 CHRONIC BILATERAL LOW BACK PAIN WITH BILATERAL SCIATICA: Status: ACTIVE | Noted: 2025-06-01

## 2025-06-01 LAB
ALBUMIN SERPL BCG-MCNC: 3.5 G/DL (ref 3.5–5)
ALP SERPL-CCNC: 370 U/L (ref 34–104)
ALT SERPL W P-5'-P-CCNC: 24 U/L (ref 7–52)
ANION GAP SERPL CALCULATED.3IONS-SCNC: 7 MMOL/L (ref 4–13)
AST SERPL W P-5'-P-CCNC: 37 U/L (ref 13–39)
BACTERIA BLD CULT: NORMAL
BACTERIA BLD CULT: NORMAL
BILIRUB DIRECT SERPL-MCNC: 5.5 MG/DL (ref 0–0.2)
BILIRUB SERPL-MCNC: 9.11 MG/DL (ref 0.2–1)
BUN SERPL-MCNC: 36 MG/DL (ref 5–25)
CALCIUM SERPL-MCNC: 8.8 MG/DL (ref 8.4–10.2)
CHLORIDE SERPL-SCNC: 108 MMOL/L (ref 96–108)
CO2 SERPL-SCNC: 17 MMOL/L (ref 21–32)
CREAT SERPL-MCNC: 2.68 MG/DL (ref 0.6–1.3)
ERYTHROCYTE [DISTWIDTH] IN BLOOD BY AUTOMATED COUNT: 14.2 % (ref 11.6–15.1)
GFR SERPL CREATININE-BSD FRML MDRD: 25 ML/MIN/1.73SQ M
GLUCOSE SERPL-MCNC: 143 MG/DL (ref 65–140)
GLUCOSE SERPL-MCNC: 178 MG/DL (ref 65–140)
GLUCOSE SERPL-MCNC: 180 MG/DL (ref 65–140)
GLUCOSE SERPL-MCNC: 201 MG/DL (ref 65–140)
GLUCOSE SERPL-MCNC: 86 MG/DL (ref 65–140)
HCT VFR BLD AUTO: 23.7 % (ref 36.5–49.3)
HGB BLD-MCNC: 8.2 G/DL (ref 12–17)
MAGNESIUM SERPL-MCNC: 1.9 MG/DL (ref 1.9–2.7)
MCH RBC QN AUTO: 32.4 PG (ref 26.8–34.3)
MCHC RBC AUTO-ENTMCNC: 34.6 G/DL (ref 31.4–37.4)
MCV RBC AUTO: 94 FL (ref 82–98)
PHOSPHATE SERPL-MCNC: 3.3 MG/DL (ref 2.7–4.5)
PLATELET # BLD AUTO: 195 THOUSANDS/UL (ref 149–390)
PMV BLD AUTO: 11.7 FL (ref 8.9–12.7)
POTASSIUM SERPL-SCNC: 5 MMOL/L (ref 3.5–5.3)
PROT SERPL-MCNC: 5.4 G/DL (ref 6.4–8.4)
RBC # BLD AUTO: 2.53 MILLION/UL (ref 3.88–5.62)
SODIUM SERPL-SCNC: 132 MMOL/L (ref 135–147)
WBC # BLD AUTO: 9.92 THOUSAND/UL (ref 4.31–10.16)

## 2025-06-01 PROCEDURE — 80076 HEPATIC FUNCTION PANEL: CPT | Performed by: INTERNAL MEDICINE

## 2025-06-01 PROCEDURE — 72100 X-RAY EXAM L-S SPINE 2/3 VWS: CPT

## 2025-06-01 PROCEDURE — 83735 ASSAY OF MAGNESIUM: CPT | Performed by: INTERNAL MEDICINE

## 2025-06-01 PROCEDURE — 84100 ASSAY OF PHOSPHORUS: CPT | Performed by: INTERNAL MEDICINE

## 2025-06-01 PROCEDURE — 85027 COMPLETE CBC AUTOMATED: CPT | Performed by: INTERNAL MEDICINE

## 2025-06-01 PROCEDURE — 82948 REAGENT STRIP/BLOOD GLUCOSE: CPT

## 2025-06-01 PROCEDURE — 76775 US EXAM ABDO BACK WALL LIM: CPT

## 2025-06-01 PROCEDURE — 99232 SBSQ HOSP IP/OBS MODERATE 35: CPT | Performed by: INTERNAL MEDICINE

## 2025-06-01 PROCEDURE — 80048 BASIC METABOLIC PNL TOTAL CA: CPT | Performed by: INTERNAL MEDICINE

## 2025-06-01 RX ORDER — HEPARIN SODIUM 5000 [USP'U]/ML
5000 INJECTION, SOLUTION INTRAVENOUS; SUBCUTANEOUS EVERY 8 HOURS SCHEDULED
Status: DISCONTINUED | OUTPATIENT
Start: 2025-06-01 | End: 2025-06-06 | Stop reason: HOSPADM

## 2025-06-01 RX ORDER — SODIUM BICARBONATE 650 MG/1
1300 TABLET ORAL
Status: DISCONTINUED | OUTPATIENT
Start: 2025-06-01 | End: 2025-06-03

## 2025-06-01 RX ADMIN — Medication 100 MG: at 08:32

## 2025-06-01 RX ADMIN — HYDRALAZINE HYDROCHLORIDE 25 MG: 25 TABLET ORAL at 14:16

## 2025-06-01 RX ADMIN — INSULIN ASPART 5 UNITS: 100 INJECTION, SUSPENSION SUBCUTANEOUS at 17:31

## 2025-06-01 RX ADMIN — LIDOCAINE 1 PATCH: 50 PATCH CUTANEOUS at 08:37

## 2025-06-01 RX ADMIN — DICLOFENAC SODIUM 2 G: 10 GEL TOPICAL at 21:54

## 2025-06-01 RX ADMIN — SODIUM BICARBONATE 650 MG TABLET 650 MG: at 08:32

## 2025-06-01 RX ADMIN — CARVEDILOL 6.25 MG: 6.25 TABLET, FILM COATED ORAL at 17:33

## 2025-06-01 RX ADMIN — INSULIN LISPRO 1 UNITS: 100 INJECTION, SOLUTION INTRAVENOUS; SUBCUTANEOUS at 08:33

## 2025-06-01 RX ADMIN — INSULIN LISPRO 1 UNITS: 100 INJECTION, SOLUTION INTRAVENOUS; SUBCUTANEOUS at 17:31

## 2025-06-01 RX ADMIN — INSULIN LISPRO 1 UNITS: 100 INJECTION, SOLUTION INTRAVENOUS; SUBCUTANEOUS at 12:24

## 2025-06-01 RX ADMIN — FOLIC ACID 1 MG: 1 TABLET ORAL at 08:32

## 2025-06-01 RX ADMIN — HEPARIN SODIUM 5000 UNITS: 5000 INJECTION INTRAVENOUS; SUBCUTANEOUS at 21:54

## 2025-06-01 RX ADMIN — HEPARIN SODIUM 5000 UNITS: 5000 INJECTION INTRAVENOUS; SUBCUTANEOUS at 14:16

## 2025-06-01 RX ADMIN — AMLODIPINE BESYLATE 5 MG: 5 TABLET ORAL at 17:33

## 2025-06-01 RX ADMIN — ACETAMINOPHEN 325 MG: 325 TABLET ORAL at 10:29

## 2025-06-01 RX ADMIN — SODIUM BICARBONATE 650 MG TABLET 1300 MG: at 17:33

## 2025-06-01 RX ADMIN — DICLOFENAC SODIUM 2 G: 10 GEL TOPICAL at 12:25

## 2025-06-01 RX ADMIN — DICLOFENAC SODIUM 2 G: 10 GEL TOPICAL at 08:34

## 2025-06-01 RX ADMIN — CARVEDILOL 6.25 MG: 6.25 TABLET, FILM COATED ORAL at 08:32

## 2025-06-01 RX ADMIN — DICLOFENAC SODIUM 2 G: 10 GEL TOPICAL at 17:34

## 2025-06-01 RX ADMIN — AMLODIPINE BESYLATE 5 MG: 5 TABLET ORAL at 08:32

## 2025-06-01 RX ADMIN — INSULIN ASPART 5 UNITS: 100 INJECTION, SUSPENSION SUBCUTANEOUS at 08:49

## 2025-06-01 NOTE — ASSESSMENT & PLAN NOTE
Lab Results   Component Value Date    EGFR 25 06/01/2025    EGFR 25 05/31/2025    EGFR 27 05/30/2025    CREATININE 2.68 (H) 06/01/2025    CREATININE 2.67 (H) 05/31/2025    CREATININE 2.55 (H) 05/30/2025   After review of records it appears that the patient has a baseline Creatinine of 1.5-2.0 mg/dL.  Avoid nephrotoxins, adjust meds to appropriate GFR.  Likely has underlying CKD secondary to poorly controlled diabetes plus hypertensive nephrosclerosis plus age-related nephron loss  Not seen by nephrology since 2019

## 2025-06-01 NOTE — ASSESSMENT & PLAN NOTE
History of alcohol use with withdrawal and withdrawal seizures  Father reported active consumption of alcohol  Initially noted to have altered mental status, treated in the ICU with phenobarbital for possible alcohol withdrawal  Continue thiamine and folic acid  Case management following for rehab referrals

## 2025-06-01 NOTE — ASSESSMENT & PLAN NOTE
No evidence of blood loss.  Transfused 1 unit PRBC 5/28, and again 5/30  EGD and colonoscopy February with dieulafoy lesion but no other sources of bleeding    Results from last 7 days   Lab Units 06/01/25  0525 05/31/25  0508 05/30/25  0533 05/29/25  0459 05/28/25  0508 05/27/25  0430 05/26/25  0541   HEMOGLOBIN g/dL 8.2* 8.9* 6.7* 7.2* 6.5* 7.2* 8.3*

## 2025-06-01 NOTE — PLAN OF CARE
Problem: PAIN - ADULT  Goal: Verbalizes/displays adequate comfort level or baseline comfort level  Description: Interventions:  - Encourage patient to monitor pain and request assistance  - Assess pain using appropriate pain scale  - Administer analgesics as ordered based on type and severity of pain and evaluate response  - Implement non-pharmacological measures as appropriate and evaluate response  - Consider cultural and social influences on pain and pain management  - Notify physician/advanced practitioner if interventions unsuccessful or patient reports new pain  - Educate patient/family on pain management process including their role and importance of  reporting pain   - Provide non-pharmacologic/complimentary pain relief interventions  Outcome: Progressing     Problem: INFECTION - ADULT  Goal: Absence or prevention of progression during hospitalization  Description: INTERVENTIONS:  - Assess and monitor for signs and symptoms of infection  - Monitor lab/diagnostic results  - Monitor all insertion sites, i.e. indwelling lines, tubes, and drains  - Monitor endotracheal if appropriate and nasal secretions for changes in amount and color  - [place] appropriate cooling/warming therapies per order  - Administer medications as ordered  - Instruct and encourage patient and family to use good hand hygiene technique  - Identify and instruct in appropriate isolation precautions for identified infection/condition  Outcome: Progressing  Goal: Absence of fever/infection during neutropenic period  Description: INTERVENTIONS:  - Monitor WBC  - Perform strict hand hygiene  - Limit to healthy visitors only  - No plants, dried, fresh or silk flowers with mcclure in patient room  Outcome: Progressing     Problem: SAFETY ADULT  Goal: Patient will remain free of falls  Description: INTERVENTIONS:  - Educate patient/family on patient safety including physical limitations  - Instruct patient to call for assistance with activity   -  Consider consulting OT/PT to assist with strengthening/mobility based on AM PAC & JH-HLM score  - Consult OT/PT to assist with strengthening/mobility   - Keep Call bell within reach  - Keep bed low and locked with side rails adjusted as appropriate  - Keep care items and personal belongings within reach  - Initiate and maintain comfort rounds  - Make Fall Risk Sign visible to staff  - Offer Toileting every 2 Hours, in advance of need  - Initiate/Maintain bed alarm  - Obtain necessary fall risk management equipment: alarms  - Apply yellow socks and bracelet for high fall risk patients  - Consider moving patient to room near nurses station  Outcome: Progressing  Goal: Maintain or return to baseline ADL function  Description: INTERVENTIONS:  -  Assess patient's ability to carry out ADLs; assess patient's baseline for ADL function and identify physical deficits which impact ability to perform ADLs (bathing, care of mouth/teeth, toileting, grooming, dressing, etc.)  - Assess/evaluate cause of self-care deficits   - Assess range of motion  - Assess patient's mobility; develop plan if impaired  - Assess patient's need for assistive devices and provide as appropriate  - Encourage maximum independence but intervene and supervise when necessary  - Involve family in performance of ADLs  - Assess for home care needs following discharge   - Consider OT consult to assist with ADL evaluation and planning for discharge  - Provide patient education as appropriate  - Monitor functional capacity and physical performance, use of AM PAC & JH-HLM   - Monitor gait, balance and fatigue with ambulation    Outcome: Progressing  Goal: Maintains/Returns to pre admission functional level  Description: INTERVENTIONS:  - Perform AM-PAC 6 Click Basic Mobility/ Daily Activity assessment daily.  - Set and communicate daily mobility goal to care team and patient/family/caregiver.   - Collaborate with rehabilitation services on mobility goals if  consulted  - Perform Range of Motion 4 times a day.  - Reposition patient every 2 hours.  - Dangle patient 3 times a day  - Stand patient 3 times a day  - Ambulate patient 3 times a day  - Out of bed to chair 3 times a day   - Out of bed for meals 3 times a day  - Out of bed for toileting  - Record patient progress and toleration of activity level   Outcome: Progressing     Problem: DISCHARGE PLANNING  Goal: Discharge to home or other facility with appropriate resources  Description: INTERVENTIONS:  - Identify barriers to discharge w/patient and caregiver  - Arrange for needed discharge resources and transportation as appropriate  - Identify discharge learning needs (meds, wound care, etc.)  - Arrange for interpretive services to assist at discharge as needed  - Refer to Case Management Department for coordinating discharge planning if the patient needs post-hospital services based on physician/advanced practitioner order or complex needs related to functional status, cognitive ability, or social support system  Outcome: Progressing     Problem: Knowledge Deficit  Goal: Patient/family/caregiver demonstrates understanding of disease process, treatment plan, medications, and discharge instructions  Description: Complete learning assessment and assess knowledge base.  Interventions:  - Provide teaching at level of understanding  - Provide teaching via preferred learning methods  Outcome: Progressing     Problem: GASTROINTESTINAL - ADULT  Goal: Minimal or absence of nausea and/or vomiting  Description: INTERVENTIONS:  - Administer IV fluids if ordered to ensure adequate hydration  - Maintain NPO status until nausea and vomiting are resolved  - Nasogastric tube if ordered  - Administer ordered antiemetic medications as needed  - Provide nonpharmacologic comfort measures as appropriate  - Advance diet as tolerated, if ordered  - Consider nutrition services referral to assist patient with adequate nutrition and appropriate  food choices  Outcome: Progressing  Goal: Maintains adequate nutritional intake  Description: INTERVENTIONS:  - Monitor percentage of each meal consumed  - Identify factors contributing to decreased intake, treat as appropriate  - Assist with meals as needed  - Monitor I&O, weight, and lab values if indicated  - Obtain nutrition services referral as needed  Outcome: Progressing     Problem: METABOLIC, FLUID AND ELECTROLYTES - ADULT  Goal: Electrolytes maintained within normal limits  Description: INTERVENTIONS:  - Monitor labs and assess patient for signs and symptoms of electrolyte imbalances  - Administer electrolyte replacement as ordered  - Monitor response to electrolyte replacements, including repeat lab results as appropriate  - Instruct patient on fluid and nutrition as appropriate  Outcome: Progressing  Goal: Fluid balance maintained  Description: INTERVENTIONS:  - Monitor labs   - Monitor I/O and WT  - Instruct patient on fluid and nutrition as appropriate  - Assess for signs & symptoms of volume excess or deficit  Outcome: Progressing  Goal: Glucose maintained within target range  Description: INTERVENTIONS:  - Monitor Blood Glucose as ordered  - Assess for signs and symptoms of hyperglycemia and hypoglycemia  - Administer ordered medications to maintain glucose within target range  - Assess nutritional intake and initiate nutrition service referral as needed  Outcome: Progressing     Problem: SKIN/TISSUE INTEGRITY - ADULT  Goal: Skin Integrity remains intact(Skin Breakdown Prevention)  Description: Assess:  -Perform Jose assessment every shift  -Clean and moisturize skin every shift  -Inspect skin when repositioning, toileting, and assisting with ADLS  -Assess under medical devices such as IV every shift  -Assess extremities for adequate circulation and sensation     Bed Management:  -Have minimal linens on bed & keep smooth, unwrinkled  -Change linens as needed when moist or perspiring  -Avoid sitting  or lying in one position for more than 2 hours while in bed  -Keep HOB at 30 degrees     Toileting:  -Offer bedside commode  -Assess for incontinence every shift  -Use incontinent care products after each incontinent episode such as foam cleanser    Activity:  -Mobilize patient 3 times a day  -Encourage activity and walks on unit  -Encourage or provide ROM exercises   -Turn and reposition patient every 2 Hours  -Use appropriate equipment to lift or move patient in bed  -Instruct/ Assist with weight shifting every hour when out of bed in chair  -Consider limitation of chair time 1 hour intervals    Skin Care:  -Avoid use of baby powder, tape, friction and shearing, hot water or constrictive clothing  -Relieve pressure over bony prominences using pillows  -Do not massage red bony areas    Next Steps:  -Teach patient strategies to minimize risks such as skin breakdown   -Consider consults to  interdisciplinary teams such as wound care  Outcome: Progressing     Problem: HEMATOLOGIC - ADULT  Goal: Maintains hematologic stability  Description: INTERVENTIONS  - Assess for signs and symptoms of bleeding or hemorrhage  - Monitor labs  - Administer supportive blood products/factors as ordered and appropriate  Outcome: Progressing     Problem: MUSCULOSKELETAL - ADULT  Goal: Maintain or return mobility to safest level of function  Description: INTERVENTIONS:  - Assess patient's ability to carry out ADLs; assess patient's baseline for ADL function and identify physical deficits which impact ability to perform ADLs (bathing, care of mouth/teeth, toileting, grooming, dressing, etc.)  - Assess/evaluate cause of self-care deficits   - Assess range of motion  - Assess patient's mobility  - Assess patient's need for assistive devices and provide as appropriate  - Encourage maximum independence but intervene and supervise when necessary  - Involve family in performance of ADLs  - Assess for home care needs following discharge   -  Consider OT consult to assist with ADL evaluation and planning for discharge  - Provide patient education as appropriate  Outcome: Progressing  Goal: Maintain proper alignment of affected body part  Description: INTERVENTIONS:  - Support, maintain and protect limb and body alignment  - Provide patient/ family with appropriate education  Outcome: Progressing     Problem: Prexisting or High Potential for Compromised Skin Integrity  Goal: Skin integrity is maintained or improved  Description: INTERVENTIONS:  - Identify patients at risk for skin breakdown  - Assess and monitor skin integrity including under and around medical devices   - Assess and monitor nutrition and hydration status  - Monitor labs  - Assess for incontinence   - Turn and reposition patient  - Assist with mobility/ambulation  - Relieve pressure over keith prominences   - Avoid friction and shearing  - Provide appropriate hygiene as needed including keeping skin clean and dry  - Evaluate need for skin moisturizer/barrier cream  - Collaborate with interdisciplinary team  - Patient/family teaching  - Consider wound care consult    Assess:  - Review Jose scale daily  - Clean and moisturize skin every shift  - Inspect skin when repositioning, toileting, and assisting with ADLS  - Assess under medical devices such as IV every shift  - Assess extremities for adequate circulation and sensation     Bed Management:  - Have minimal linens on bed & keep smooth, unwrinkled  - Change linens as needed when moist or perspiring  - Avoid sitting or lying in one position for more than 2 hours while in bed?Keep HOB at 30 degrees   - Toileting:  - Offer bedside commode  - Assess for incontinence every shift  - Use incontinent care products after each incontinent episode such as foam cleanser    Activity:  - Mobilize patient 3 times a day  - Encourage activity and walks on unit  - Encourage or provide ROM exercises   - Turn and reposition patient every 2 Hours  - Use  appropriate equipment to lift or move patient in bed  - Instruct/ Assist with weight shifting every hour when out of bed in chair  - Consider limitation of chair time 12 hour intervals    Skin Care:  - Avoid use of baby powder, tape, friction and shearing, hot water or constrictive clothing  - Relieve pressure over bony prominences using pillows  - Do not massage red bony areas    Next Steps:  - Teach patient strategies to minimize risks such as skin breakdown  - Consider consults to  interdisciplinary teams such as wound care  Outcome: Progressing     Problem: MOBILITY - ADULT  Goal: Maintain or return to baseline ADL function  Description: INTERVENTIONS:  -  Assess patient's ability to carry out ADLs; assess patient's baseline for ADL function and identify physical deficits which impact ability to perform ADLs (bathing, care of mouth/teeth, toileting, grooming, dressing, etc.)  - Assess/evaluate cause of self-care deficits   - Assess range of motion  - Assess patient's mobility; develop plan if impaired  - Assess patient's need for assistive devices and provide as appropriate  - Encourage maximum independence but intervene and supervise when necessary  - Involve family in performance of ADLs  - Assess for home care needs following discharge   - Consider OT consult to assist with ADL evaluation and planning for discharge  - Provide patient education as appropriate  - Monitor functional capacity and physical performance, use of AM PAC & JH-HLM   - Monitor gait, balance and fatigue with ambulation    Outcome: Progressing  Goal: Maintains/Returns to pre admission functional level  Description: INTERVENTIONS:  - Perform AM-PAC 6 Click Basic Mobility/ Daily Activity assessment daily.  - Set and communicate daily mobility goal to care team and patient/family/caregiver.   - Collaborate with rehabilitation services on mobility goals if consulted  - Perform Range of Motion 4 times a day.  - Reposition patient every 2  hours.  - Dangle patient 3 times a day  - Stand patient 3 times a day  - Ambulate patient 3 times a day  - Out of bed to chair 3 times a day   - Out of bed for meals 3 times a day  - Out of bed for toileting  - Record patient progress and toleration of activity level   Outcome: Progressing

## 2025-06-01 NOTE — ASSESSMENT & PLAN NOTE
Patient complains of low back pain across his bilateral lower back, radiating down both legs to his toes.  Denies any numbness.  States it has been present for many months  Patient had previous workups with x-rays of lumbar spine  Check x-ray  Continue ambulation  Continue Tylenol/Lidoderm/Voltaren/heating

## 2025-06-01 NOTE — PROGRESS NOTES
Progress Note - Hospitalist   Name: Wes Araujo 55 y.o. male I MRN: [id number]  Unit/Bed#: [place] [address] -01 I Date of Admission: 5/23/2025   Date of Service: 6/1/2025 I Hospital Day: 9    Assessment & Plan  Toxic metabolic encephalopathy  History of atrial fibrillation hypertension alcohol liver disease who presented to the hospital for change in mental status found to have hyperglycemia    Admitted to ICU and stabilized  Altered mental status attributed to toxic metabolic encephalopathy secondary to hyperglycemia with HHNK, acute kidney injury, and acute liver failure  Clinically improved back to baseline  Acute renal failure superimposed on stage 3 chronic kidney disease (HCC)  Patient presented with acute kidney injury with a creatinine of 1.99  History of CKD 3: baseline creatinine approximately 1.5-2.0  Appreciate nephrology evaluation and recommendations: Secondary to prerenal azotemia, diuresis, subsequent ATN versus HRS in the setting of acute liver failure  Creatinine has since worsened to 2.68  Status post IV fluids, IV albumin  Renal ultrasound: Pending      Results from last 7 days   Lab Units 06/01/25  0525 05/31/25  0508 05/30/25  0533 05/29/25  0459 05/28/25  0508 05/27/25  0430 05/26/25  0541   BUN mg/dL 36* 30* 24 21 22 21 16   CREATININE mg/dL 2.68* 2.67* 2.55* 2.41* 2.53* 2.47* 2.23*   EGFR ml/min/1.73sq m 25 25 27 29 27 28 31     Abnormal LFTs  Evidence of worsening hyperbilirubinemia, elevated alkaline phosphatase and coagulopathy on admission  No significant transaminitis: Initially mild elevated AST  Being followed by gastroenterology.    Noted to have chronically elevated liver enzymes: Acute rise this admission was attributed to acute alcoholic hepatitis  Discriminant function did not meet criteria for steroids  Per GI: No stigmata of chronic liver disease on exam  Outpatient elastography versus liver biopsy for definitive diagnosis  Serologic workup negative for etiology of liver  injury  Initial coagulopathy resolved with vitamin K  Ruled out infection: Cultures no growth    Results from last 7 days   Lab Units 06/01/25  0525 05/31/25  0508 05/30/25  0533 05/29/25  0459 05/28/25  0508 05/27/25  0430 05/26/25  0541   AST U/L 37 35 35 33 43* 49* 51*   ALT U/L 24 23 19 22 27 33 34   TOTAL BILIRUBIN mg/dL 9.11* 10.99* 7.84* 7.91* 7.26* 7.14* 3.61*     Results from last 7 days   Lab Units 05/30/25  0533 05/28/25  0508 05/27/25  0430 05/26/25  0541   INR  1.07 1.06 1.11 5.53*     Other specified anemias  No evidence of blood loss.  Transfused 1 unit PRBC 5/28, and again 5/30  EGD and colonoscopy February with dieulafoy lesion but no other sources of bleeding    Results from last 7 days   Lab Units 06/01/25  0525 05/31/25  0508 05/30/25  0533 05/29/25  0459 05/28/25  0508 05/27/25  0430 05/26/25  0541   HEMOGLOBIN g/dL 8.2* 8.9* 6.7* 7.2* 6.5* 7.2* 8.3*     Primary hypertension  Initially presented with markedly elevated blood pressure secondary to noncompliance  Continue amlodipine 5 mg twice daily, hydralazine 25 mg 3 times daily and new Coreg 6.25 twice daily  Blood pressure adequately controlled  Type 2 diabetes mellitus with hyperglycemia, with long-term current use of insulin ([facility])  Lab Results   Component Value Date    HGBA1C 9.6 (H) 05/26/2025     Recent Labs     05/31/25  1606 05/31/25  2128 06/01/25  0805 06/01/25  1143   POCGLU 201* 120 201* 178*     HHNK upon arrival likely related to noncompliance with medications and alcohol use  Restarted on 70/30 5 units twice daily   Continue to monitor and titrate as needed  Alcohol abuse  History of alcohol use with withdrawal and withdrawal seizures  Father reported active consumption of alcohol  Initially noted to have altered mental status, treated in the ICU with phenobarbital for possible alcohol withdrawal  Continue thiamine and folic acid  Case management following for rehab referrals  History of atrial fibrillation  History of atrial  fibrillation on metoprolol  Not on anticoagulation at baseline  Hyponatremia  Patient with hyponatremia  Reviewed previous records: Appears to have chronic, intermittent hyponatremia, suspect secondary to free water excess in the setting of alcohol abuse  Appreciate nephrology evaluation and recommendations  Chronic pancreatitis (HCC)  Patient with a history of chronic pancreatitis secondary to alcohol  Initially presented with abdominal pain nausea/vomiting, since improved  Continue diet, and complete alcohol cessation  Continue proton pump inhibitor  Chronic bilateral low back pain with bilateral sciatica  Patient complains of low back pain across his bilateral lower back, radiating down both legs to his toes.  Denies any numbness.  States it has been present for many months  Patient had previous workups with x-rays of lumbar spine  Check x-ray  Continue ambulation  Continue Tylenol/Lidoderm/Voltaren/heating    VTE Pharmacologic Prophylaxis: VTE Score: 3 Moderate Risk (Score 3-4) - Pharmacological DVT Prophylaxis Ordered: heparin.    Mobility:   Basic Mobility Inpatient Raw Score: 22  JH-HLM Goal: 7: Walk 25 feet or more  JH-HLM Achieved: 7: Walk 25 feet or more  JH-HLM Goal achieved. Continue to encourage appropriate mobility.    Patient Centered Rounds: I performed bedside rounds with nursing staff today.   Discussions with Specialists or Other Care Team Provider: d/w nephrology JAVIER Siegel PA-C    Education and Discussions with Family / Patient: Updated patient, in Nepali at the bedside.  Called patient's father: Left a message to call me with update    Current Length of Stay: 9 day(s)  Current Patient Status: Inpatient   Certification Statement: The patient will continue to require additional inpatient hospital stay due to acute kidney injury  Discharge Plan: Anticipate discharge in 48-72 hrs to rehab facility.    Code Status: Level 1 - Full Code    Subjective   Patient was examined and interviewed by me in  Chadian at the bedside    Patient notes he has pain and indicates his bilateral lower back.  Indicates it radiates down both legs from his hips down to his toes.  States it is worse with ambulating.  Relates it has been present for many months.    Denies any numbness.    Denies any pain anywhere else.  Notes in the past he has had abdominal pain and indicates epigastric region.  States none currently.  Denies any pain after eating.  Denies any nausea.  Denies any vomiting.  Notes he had diarrhea previously but none today.  States he has not yet passed a bowel movement today.    Patient denies any difficulty breathing.  Denies any chest congestion or cough.  Denies any pain anywhere else.    Objective :  Temp:  [97.7 °F (36.5 °C)-98.2 °F (36.8 °C)] 98.2 °F (36.8 °C)  HR:  [71-77] 75  BP: (126-135)/(70-75) 135/75  Resp:  [18-20] 20  SpO2:  [98 %-99 %] 98 %  O2 Device: None (Room air)    Body mass index is 19.66 kg/m².     Input and Output Summary (last 24 hours):     Intake/Output Summary (Last 24 hours) at 6/1/2025 1347  Last data filed at 6/1/2025 1300  Gross per 24 hour   Intake 1738 ml   Output 550 ml   Net 1188 ml       Physical Exam  General: Very pleasant male.  No acute distress.  Nontachypneic and nondyspneic  Heart: Regular rate and rhythm.  S1-S2 present.  No murmur, rub, gallop  Lungs: Clear to auscultation bilaterally.  Good air movement.  No wheezes, crackles, rhonchi.  No accessory muscle use or respiratory distress  Abdomen: Soft, nontender with palpation.  Nondistended.  Normal active bowel sounds present.  No guarding or rebound.  No peritoneal signs or mass  Extremities: No clubbing, cyanosis, edema.  2+ pedal pulses bilaterally  Neurologic.  Awake.  Interactive.  No somnolence or lethargy.  Cooperates with exam.  Moving all 4 extremities    Lines/Drains:              Lab Results: I have reviewed the following results:   Results from last 7 days   Lab Units 06/01/25  0525   WBC Thousand/uL 9.92    HEMOGLOBIN g/dL 8.2*   HEMATOCRIT % 23.7*   PLATELETS Thousands/uL 195     Results from last 7 days   Lab Units 06/01/25  0525   SODIUM mmol/L 132*   POTASSIUM mmol/L 5.0   CHLORIDE mmol/L 108   CO2 mmol/L 17*   BUN mg/dL 36*   CREATININE mg/dL 2.68*   ANION GAP mmol/L 7   CALCIUM mg/dL 8.8   ALBUMIN g/dL 3.5   TOTAL BILIRUBIN mg/dL 9.11*   ALK PHOS U/L 370*   ALT U/L 24   AST U/L 37   GLUCOSE RANDOM mg/dL 86     Results from last 7 days   Lab Units 05/30/25  0533   INR  1.07     Results from last 7 days   Lab Units 06/01/25  1143 06/01/25  0805 05/31/25  2128 05/31/25  1606 05/31/25  1122 05/31/25  0714 05/30/25  2107 05/30/25  1635 05/30/25  1225 05/30/25  1140 05/30/25  1116 05/30/25  0741   POC GLUCOSE mg/dl 178* 201* 120 201* 121 125 129 167* 94 61* 63* 164*     Results from last 7 days   Lab Units 05/26/25  1202   HEMOGLOBIN A1C % 9.6*           Recent Cultures (last 7 days):   Results from last 7 days   Lab Units 05/26/25  1742   BLOOD CULTURE  No Growth After 5 Days.  No Growth After 5 Days.     ===================================================  Imaging    5/26 chest x-rayLeft lower lobe atelectasis versus infiltrate     5/23 right upper quadrant ultrasound  Heterogeneous appearance of the pancreas, correlates with fatty replacement and calcifications seen on prior CT, possibly sequela of chronic inflammation/chronic pancreatitis.  Liver appears grossly unremarkable.  Small volume hypoechoic ascites, and other findings as above.    5/23 CT head without contrast  No acute intracranial abnormality.     Microbiology   5/26 blood culture: Negative x 2        ==================================================    Last 24 Hours Medication List:     Current Facility-Administered Medications:     acetaminophen (TYLENOL) tablet 325 mg, Q6H PRN    amLODIPine (NORVASC) tablet 5 mg, BID    carvedilol (COREG) tablet 6.25 mg, BID With Meals    Diclofenac Sodium (VOLTAREN) 1 % topical gel 2 g, 4x Daily    folic acid  (FOLVITE) tablet 1 mg, Daily    heparin (porcine) subcutaneous injection 5,000 Units, Q8H JAISON    hydrALAZINE (APRESOLINE) injection 10 mg, Q4H PRN    hydrALAZINE (APRESOLINE) tablet 25 mg, Q8H JAISON    insulin aspart protamine-insulin aspart (NovoLOG 70/30) 100 units/mL subcutaneous injection 5 Units, BID AC    insulin lispro (HumALOG/ADMELOG) 100 units/mL subcutaneous injection 1-5 Units, TID AC **AND** Fingerstick Glucose (POCT), TID AC    insulin lispro (HumALOG/ADMELOG) 100 units/mL subcutaneous injection 1-5 Units, HS    lidocaine (LIDODERM) 5 % patch 1 patch, Daily    ondansetron (ZOFRAN) injection 4 mg, Q4H PRN    sodium bicarbonate tablet 1,300 mg, TID after meals    thiamine tablet 100 mg, Daily    Administrative Statements   Today, Patient Was Seen By: Mojgan Young MD      **Please Note: This note may have been constructed using a voice recognition system.**

## 2025-06-01 NOTE — PROGRESS NOTES
Progress Note - Nephrology   Name: Wes Araujo 55 y.o. male I MRN: [id number]  Unit/Bed#: [place] [address] -01 I Date of Admission: 5/23/2025   Date of Service: 6/1/2025 I Hospital Day: 9     Assessment & Plan  Acute kidney injury (HCC)  ZENAIDA most likely secondary to prerenal azotemia secondary to osmotic diuresis plus some component of intravascular volume depletion in light of hypoalbuminemia with subsequent ATN versus HRS in the presence of acute liver injury  After review of records it appears that the patient has a baseline Creatinine of 1.5-2.0 mg/dL.  Admission creatinine of 1.99 mg/dL on 5/23.  Creatinine stable but remains above baseline - continue to monitor and trend  Consider checking renal ultrasound tomorrow if no improvement  S/p 500ml IVF bolus 5/31/25  Hold further albumin  CT abdomen with contrast/26/25 no hydronephrosis unremarkable kidneys  Strict I/O.  Daily weights  Urinary retention protocol  Avoid nephrotoxins, adjust meds to appropriate GFR.  CKD stage 3b, GFR 30-44 ml/min ([facility])  Lab Results   Component Value Date    EGFR 25 06/01/2025    EGFR 25 05/31/2025    EGFR 27 05/30/2025    CREATININE 2.68 (H) 06/01/2025    CREATININE 2.67 (H) 05/31/2025    CREATININE 2.55 (H) 05/30/2025   After review of records it appears that the patient has a baseline Creatinine of 1.5-2.0 mg/dL.  Avoid nephrotoxins, adjust meds to appropriate GFR.  Likely has underlying CKD secondary to poorly controlled diabetes plus hypertensive nephrosclerosis plus age-related nephron loss  Not seen by nephrology since 2019  Hyponatremia  Down from yesterday  S/p 500ml ivf 5/31 and still worsened  Sodium bicarbonate tablets increased   Check BMP in a.m.  Primary hypertension  BP improved  Optimize hemodynamic status to avoid renal ischemic injury.  Avoid BP fluctuations.  Home medications: Hydralazine 25 mg p.o. every 8, Norvasc 5 mg p.o. twice daily  Current medications: coreg 3.125mg BID, Norvasc 5 mg p.o. twice daily,  hydralazine 25 mg p.o. every 8h  Increased coreg to 6.25mg BID 5/31  Type 2 diabetes mellitus with hyperglycemia, with long-term current use of insulin (HCC)  Lab Results   Component Value Date    HGBA1C 9.6 (H) 05/26/2025   On insulin  Recommend tight glycemic control  Alcohol abuse  Monitor for withdrawal  Alcohol rehab on discharge  History of atrial fibrillation  Follow-up with cardiology  Acute liver failure  Follow-up with GI  Rising LFTs and bilirubin levels  Other specified anemias  Transfuse as needed, s/p PRBC already this admission  Maintain hemoglobin greater than 8 grams/deciliter  Follow-up with primary team  Hyperkalemia  Stable, borderline high  Placed on low potassium diet  Check BMP in a.m.  Metabolic acidosis  Increase sodium bicarbonate tablets to 1300 TID    I have reviewed the nephrology recommendations including bicarbonate tablets, with slim attending, and we are in agreement with renal plan including the information outlined above. Nephrology service will follow.    Subjective    [id number] utilized  Patient denies acute complaints.  No questions.  Understands plan to increase bicarb tabs.     Objective :  Temp:  [97.7 °F (36.5 °C)-98.2 °F (36.8 °C)] 98.2 °F (36.8 °C)  HR:  [71-77] 75  BP: (126-135)/(70-75) 135/75  Resp:  [18-20] 20  SpO2:  [98 %-99 %] 98 %  O2 Device: None (Room air)    Current Weight: Weight - Scale: 60.4 kg (133 lb 2.5 oz)  First Weight: Weight - Scale: 55.4 kg (122 lb 2.2 oz)  I/O         05/30 0701  05/31 0700 05/31 0701  06/01 0700 06/01 0701  06/02 0700    P.O. 1218 1738     Blood 353.3      Total Intake(mL/kg) 1571.3 (26.2) 1738 (28.8)     Urine (mL/kg/hr) 1200 (0.8) 750 (0.5)     Total Output 1200 750     Net +371.3 +988            Unmeasured Urine Occurrence  1 x           Physical Exam  General: NAD  Neuro: alert awake  Psych: mood and affect appropriate  Skin: no rash  Eyes: anicteric  ENMT: mm moist  Neck: no masses  Respiratory: ctab  Cardiovascular:  rrr  Extremities: no edema  Gastrointestinal: soft nt nd    Medications:  Current Medications[1]      Lab Results: I have reviewed the following results:  Results from last 7 days   Lab Units 06/01/25  0525 05/31/25  0508 05/30/25  0533 05/29/25  0459 05/28/25  0508 05/27/25  0430 05/26/25  0541   WBC Thousand/uL 9.92 11.51* 9.68 9.70 9.29 11.67* 12.21*   HEMOGLOBIN g/dL 8.2* 8.9* 6.7* 7.2* 6.5* 7.2* 8.3*   HEMATOCRIT % 23.7* 25.8* 19.7* 20.9* 19.5* 21.8* 25.0*   PLATELETS Thousands/uL 195 220 208 219 243 261 279   POTASSIUM mmol/L 5.0 4.8 5.3 5.6* 5.1 5.0 5.3   CHLORIDE mmol/L 108 108 108 109* 109* 105 104   CO2 mmol/L 17* 20* 16* 21 21 21 24   BUN mg/dL 36* 30* 24 21 22 21 16   CREATININE mg/dL 2.68* 2.67* 2.55* 2.41* 2.53* 2.47* 2.23*   CALCIUM mg/dL 8.8 9.1 8.5 9.0 8.5 8.1* 8.3*   MAGNESIUM mg/dL 1.9  --   --   --  2.3  --  2.5   PHOSPHORUS mg/dL 3.3 3.0  --  2.4* 2.8  --  4.6*   ALBUMIN g/dL 3.5 3.8 3.7 3.7 3.0* 2.5* 2.4*          [1]   Current Facility-Administered Medications:     acetaminophen (TYLENOL) tablet 325 mg, 325 mg, Oral, Q6H PRN, ARIANE Moore    amLODIPine (NORVASC) tablet 5 mg, 5 mg, Oral, BID, Bibi Solis MD, 5 mg at 06/01/25 0832    carvedilol (COREG) tablet 6.25 mg, 6.25 mg, Oral, BID With Meals, Sonja Siegel PA-C, 6.25 mg at 06/01/25 0832    Diclofenac Sodium (VOLTAREN) 1 % topical gel 2 g, 2 g, Topical, 4x Daily, ARIANE Moore, 2 g at 06/01/25 0834    folic acid (FOLVITE) tablet 1 mg, 1 mg, Oral, Daily, Bala Leroy DO, 1 mg at 06/01/25 0832    hydrALAZINE (APRESOLINE) injection 10 mg, 10 mg, Intravenous, Q4H PRN, ARIANE Leggett, 10 mg at 05/25/25 0318    hydrALAZINE (APRESOLINE) tablet 25 mg, 25 mg, Oral, Q8H JAISON, Sonja Siegel PA-C, 25 mg at 05/31/25 2106    insulin aspart protamine-insulin aspart (NovoLOG 70/30) 100 units/mL subcutaneous injection 5 Units, 5 Units, Subcutaneous, BID AC, ARIANE Leggett, 5 Units at 06/01/25 0849    insulin lispro  (HumALOG/ADMELOG) 100 units/mL subcutaneous injection 1-5 Units, 1-5 Units, Subcutaneous, TID AC, 1 Units at 06/01/25 0833 **AND** Fingerstick Glucose (POCT), , , TID AC, ARIANE Leggett    insulin lispro (HumALOG/ADMELOG) 100 units/mL subcutaneous injection 1-5 Units, 1-5 Units, Subcutaneous, HS, ARIANE Leggett, 1 Units at 05/29/25 2233    lidocaine (LIDODERM) 5 % patch 1 patch, 1 patch, Topical, Daily, ARIANE Moore, 1 patch at 06/01/25 0837    ondansetron (ZOFRAN) injection 4 mg, 4 mg, Intravenous, Q4H PRN, Bala Leroy DO    sodium bicarbonate tablet 1,300 mg, 1,300 mg, Oral, TID after meals, Sonja Siegel PA-C    thiamine tablet 100 mg, 100 mg, Oral, Daily, Bala Leroy DO, 100 mg at 06/01/25 0832

## 2025-06-01 NOTE — ASSESSMENT & PLAN NOTE
Patient presented with acute kidney injury with a creatinine of 1.99  History of CKD 3: baseline creatinine approximately 1.5-2.0  Appreciate nephrology evaluation and recommendations: Secondary to prerenal azotemia, diuresis, subsequent ATN versus HRS in the setting of acute liver failure  Creatinine has since worsened to 2.68  Status post IV fluids, IV albumin  Renal ultrasound: Pending      Results from last 7 days   Lab Units 06/01/25  0525 05/31/25  0508 05/30/25  0533 05/29/25  0459 05/28/25  0508 05/27/25  0430 05/26/25  0541   BUN mg/dL 36* 30* 24 21 22 21 16   CREATININE mg/dL 2.68* 2.67* 2.55* 2.41* 2.53* 2.47* 2.23*   EGFR ml/min/1.73sq m 25 25 27 29 27 28 31

## 2025-06-01 NOTE — ASSESSMENT & PLAN NOTE
ZENAIDA most likely secondary to prerenal azotemia secondary to osmotic diuresis plus some component of intravascular volume depletion in light of hypoalbuminemia with subsequent ATN versus HRS in the presence of acute liver injury  After review of records it appears that the patient has a baseline Creatinine of 1.5-2.0 mg/dL.  Admission creatinine of 1.99 mg/dL on 5/23.  Creatinine stable but remains above baseline - continue to monitor and trend  Consider checking renal ultrasound tomorrow if no improvement  S/p 500ml IVF bolus 5/31/25  Hold further albumin  CT abdomen with contrast/26/25 no hydronephrosis unremarkable kidneys  Strict I/O.  Daily weights  Urinary retention protocol  Avoid nephrotoxins, adjust meds to appropriate GFR.

## 2025-06-01 NOTE — ASSESSMENT & PLAN NOTE
Down from yesterday  S/p 500ml ivf 5/31 and still worsened  Sodium bicarbonate tablets increased   Check BMP in a.m.

## 2025-06-01 NOTE — ASSESSMENT & PLAN NOTE
BP improved  Optimize hemodynamic status to avoid renal ischemic injury.  Avoid BP fluctuations.  Home medications: Hydralazine 25 mg p.o. every 8, Norvasc 5 mg p.o. twice daily  Current medications: coreg 3.125mg BID, Norvasc 5 mg p.o. twice daily, hydralazine 25 mg p.o. every 8h  Increased coreg to 6.25mg BID 5/31

## 2025-06-01 NOTE — ASSESSMENT & PLAN NOTE
Patient with a history of chronic pancreatitis secondary to alcohol  Initially presented with abdominal pain nausea/vomiting, since improved  Continue diet, and complete alcohol cessation  Continue proton pump inhibitor

## 2025-06-01 NOTE — ASSESSMENT & PLAN NOTE
History of atrial fibrillation hypertension alcohol liver disease who presented to the hospital for change in mental status found to have hyperglycemia    Admitted to ICU and stabilized  Altered mental status attributed to toxic metabolic encephalopathy secondary to hyperglycemia with HHNK, acute kidney injury, and acute liver failure  Clinically improved back to baseline

## 2025-06-01 NOTE — ASSESSMENT & PLAN NOTE
Patient with hyponatremia  Reviewed previous records: Appears to have chronic, intermittent hyponatremia, suspect secondary to free water excess in the setting of alcohol abuse  Appreciate nephrology evaluation and recommendations

## 2025-06-01 NOTE — PLAN OF CARE
Problem: PAIN - ADULT  Goal: Verbalizes/displays adequate comfort level or baseline comfort level  Description: Interventions:  - Encourage patient to monitor pain and request assistance  - Assess pain using appropriate pain scale  - Administer analgesics as ordered based on type and severity of pain and evaluate response  - Implement non-pharmacological measures as appropriate and evaluate response  - Consider cultural and social influences on pain and pain management  - Notify physician/advanced practitioner if interventions unsuccessful or patient reports new pain  - Educate patient/family on pain management process including their role and importance of  reporting pain   - Provide non-pharmacologic/complimentary pain relief interventions  Outcome: Progressing     Problem: SAFETY ADULT  Goal: Patient will remain free of falls  Description: INTERVENTIONS:  - Educate patient/family on patient safety including physical limitations  - Instruct patient to call for assistance with activity   - Consider consulting OT/PT to assist with strengthening/mobility based on AM PAC & JH-HLM score  - Consult OT/PT to assist with strengthening/mobility   - Keep Call bell within reach  - Keep bed low and locked with side rails adjusted as appropriate  - Keep care items and personal belongings within reach  - Initiate and maintain comfort rounds  - Make Fall Risk Sign visible to staff  - Offer Toileting every 2 Hours, in advance of need  - Initiate/Maintain bed alarm  - Obtain necessary fall risk management equipment: alarms  - Apply yellow socks and bracelet for high fall risk patients  - Consider moving patient to room near nurses station  Outcome: Progressing  Goal: Maintain or return to baseline ADL function  Description: INTERVENTIONS:  -  Assess patient's ability to carry out ADLs; assess patient's baseline for ADL function and identify physical deficits which impact ability to perform ADLs (bathing, care of mouth/teeth,  toileting, grooming, dressing, etc.)  - Assess/evaluate cause of self-care deficits   - Assess range of motion  - Assess patient's mobility; develop plan if impaired  - Assess patient's need for assistive devices and provide as appropriate  - Encourage maximum independence but intervene and supervise when necessary  - Involve family in performance of ADLs  - Assess for home care needs following discharge   - Consider OT consult to assist with ADL evaluation and planning for discharge  - Provide patient education as appropriate  - Monitor functional capacity and physical performance, use of AM PAC & JH-HLM   - Monitor gait, balance and fatigue with ambulation    Outcome: Progressing  Goal: Maintains/Returns to pre admission functional level  Description: INTERVENTIONS:  - Perform AM-PAC 6 Click Basic Mobility/ Daily Activity assessment daily.  - Set and communicate daily mobility goal to care team and patient/family/caregiver.   - Collaborate with rehabilitation services on mobility goals if consulted  - Perform Range of Motion 4 times a day.  - Reposition patient every 2 hours.  - Dangle patient 3 times a day  - Stand patient 3 times a day  - Ambulate patient 3 times a day  - Out of bed to chair 3 times a day   - Out of bed for meals 3 times a day  - Out of bed for toileting  - Record patient progress and toleration of activity level   Outcome: Progressing     Problem: DISCHARGE PLANNING  Goal: Discharge to home or other facility with appropriate resources  Description: INTERVENTIONS:  - Identify barriers to discharge w/patient and caregiver  - Arrange for needed discharge resources and transportation as appropriate  - Identify discharge learning needs (meds, wound care, etc.)  - Arrange for interpretive services to assist at discharge as needed  - Refer to Case Management Department for coordinating discharge planning if the patient needs post-hospital services based on physician/advanced practitioner order or  complex needs related to functional status, cognitive ability, or social support system  Outcome: Progressing     Problem: Knowledge Deficit  Goal: Patient/family/caregiver demonstrates understanding of disease process, treatment plan, medications, and discharge instructions  Description: Complete learning assessment and assess knowledge base.  Interventions:  - Provide teaching at level of understanding  - Provide teaching via preferred learning methods  Outcome: Progressing     Problem: GASTROINTESTINAL - ADULT  Goal: Minimal or absence of nausea and/or vomiting  Description: INTERVENTIONS:  - Administer IV fluids if ordered to ensure adequate hydration  - Maintain NPO status until nausea and vomiting are resolved  - Nasogastric tube if ordered  - Administer ordered antiemetic medications as needed  - Provide nonpharmacologic comfort measures as appropriate  - Advance diet as tolerated, if ordered  - Consider nutrition services referral to assist patient with adequate nutrition and appropriate food choices  Outcome: Progressing  Goal: Maintains adequate nutritional intake  Description: INTERVENTIONS:  - Monitor percentage of each meal consumed  - Identify factors contributing to decreased intake, treat as appropriate  - Assist with meals as needed  - Monitor I&O, weight, and lab values if indicated  - Obtain nutrition services referral as needed  Outcome: Progressing     Problem: METABOLIC, FLUID AND ELECTROLYTES - ADULT  Goal: Electrolytes maintained within normal limits  Description: INTERVENTIONS:  - Monitor labs and assess patient for signs and symptoms of electrolyte imbalances  - Administer electrolyte replacement as ordered  - Monitor response to electrolyte replacements, including repeat lab results as appropriate  - Instruct patient on fluid and nutrition as appropriate  Outcome: Progressing  Goal: Fluid balance maintained  Description: INTERVENTIONS:  - Monitor labs   - Monitor I/O and WT  - Instruct  patient on fluid and nutrition as appropriate  - Assess for signs & symptoms of volume excess or deficit  Outcome: Progressing  Goal: Glucose maintained within target range  Description: INTERVENTIONS:  - Monitor Blood Glucose as ordered  - Assess for signs and symptoms of hyperglycemia and hypoglycemia  - Administer ordered medications to maintain glucose within target range  - Assess nutritional intake and initiate nutrition service referral as needed  Outcome: Progressing     Problem: HEMATOLOGIC - ADULT  Goal: Maintains hematologic stability  Description: INTERVENTIONS  - Assess for signs and symptoms of bleeding or hemorrhage  - Monitor labs  - Administer supportive blood products/factors as ordered and appropriate  Outcome: Progressing     Problem: MUSCULOSKELETAL - ADULT  Goal: Maintain or return mobility to safest level of function  Description: INTERVENTIONS:  - Assess patient's ability to carry out ADLs; assess patient's baseline for ADL function and identify physical deficits which impact ability to perform ADLs (bathing, care of mouth/teeth, toileting, grooming, dressing, etc.)  - Assess/evaluate cause of self-care deficits   - Assess range of motion  - Assess patient's mobility  - Assess patient's need for assistive devices and provide as appropriate  - Encourage maximum independence but intervene and supervise when necessary  - Involve family in performance of ADLs  - Assess for home care needs following discharge   - Consider OT consult to assist with ADL evaluation and planning for discharge  - Provide patient education as appropriate  Outcome: Progressing  Goal: Maintain proper alignment of affected body part  Description: INTERVENTIONS:  - Support, maintain and protect limb and body alignment  - Provide patient/ family with appropriate education  Outcome: Progressing

## 2025-06-01 NOTE — ASSESSMENT & PLAN NOTE
Evidence of worsening hyperbilirubinemia, elevated alkaline phosphatase and coagulopathy on admission  No significant transaminitis: Initially mild elevated AST  Being followed by gastroenterology.    Noted to have chronically elevated liver enzymes: Acute rise this admission was attributed to acute alcoholic hepatitis  Discriminant function did not meet criteria for steroids  Per GI: No stigmata of chronic liver disease on exam  Outpatient elastography versus liver biopsy for definitive diagnosis  Serologic workup negative for etiology of liver injury  Initial coagulopathy resolved with vitamin K  Ruled out infection: Cultures no growth    Results from last 7 days   Lab Units 06/01/25  0525 05/31/25  0508 05/30/25  0533 05/29/25  0459 05/28/25  0508 05/27/25  0430 05/26/25  0541   AST U/L 37 35 35 33 43* 49* 51*   ALT U/L 24 23 19 22 27 33 34   TOTAL BILIRUBIN mg/dL 9.11* 10.99* 7.84* 7.91* 7.26* 7.14* 3.61*     Results from last 7 days   Lab Units 05/30/25  0533 05/28/25  0508 05/27/25  0430 05/26/25  0541   INR  1.07 1.06 1.11 5.53*

## 2025-06-01 NOTE — ASSESSMENT & PLAN NOTE
Lab Results   Component Value Date    HGBA1C 9.6 (H) 05/26/2025     Recent Labs     05/31/25  1606 05/31/25  2128 06/01/25  0805 06/01/25  1143   POCGLU 201* 120 201* 178*     HHNK upon arrival likely related to noncompliance with medications and alcohol use  Restarted on 70/30 5 units twice daily   Continue to monitor and titrate as needed

## 2025-06-01 NOTE — ASSESSMENT & PLAN NOTE
Initially presented with markedly elevated blood pressure secondary to noncompliance  Continue amlodipine 5 mg twice daily, hydralazine 25 mg 3 times daily and new Coreg 6.25 twice daily  Blood pressure adequately controlled

## 2025-06-01 NOTE — RESTORATIVE TECHNICIAN NOTE
Restorative Technician Note      Patient Name: Wes Araujo     Restorative Tech Visit Date: 06/01/25  Note Type: Mobility  Patient Position Upon Consult: Supine  Activity Performed: Ambulated  Assistive Device: Roller walker  Patient Position at End of Consult: Bedside chair; All needs within reach; Bed/Chair alarm activated            ns

## 2025-06-02 LAB
ALBUMIN SERPL BCG-MCNC: 3.5 G/DL (ref 3.5–5)
ALP SERPL-CCNC: 391 U/L (ref 34–104)
ALT SERPL W P-5'-P-CCNC: 28 U/L (ref 7–52)
ANION GAP SERPL CALCULATED.3IONS-SCNC: 8 MMOL/L (ref 4–13)
AST SERPL W P-5'-P-CCNC: 45 U/L (ref 13–39)
BASOPHILS # BLD AUTO: 0.05 THOUSANDS/ÂΜL (ref 0–0.1)
BASOPHILS NFR BLD AUTO: 1 % (ref 0–1)
BILIRUB SERPL-MCNC: 8.63 MG/DL (ref 0.2–1)
BUN SERPL-MCNC: 37 MG/DL (ref 5–25)
CALCIUM SERPL-MCNC: 8.9 MG/DL (ref 8.4–10.2)
CHLORIDE SERPL-SCNC: 109 MMOL/L (ref 96–108)
CO2 SERPL-SCNC: 16 MMOL/L (ref 21–32)
CREAT SERPL-MCNC: 2.76 MG/DL (ref 0.6–1.3)
EOSINOPHIL # BLD AUTO: 0.41 THOUSAND/ÂΜL (ref 0–0.61)
EOSINOPHIL NFR BLD AUTO: 5 % (ref 0–6)
ERYTHROCYTE [DISTWIDTH] IN BLOOD BY AUTOMATED COUNT: 14.5 % (ref 11.6–15.1)
GFR SERPL CREATININE-BSD FRML MDRD: 24 ML/MIN/1.73SQ M
GLUCOSE SERPL-MCNC: 192 MG/DL (ref 65–140)
GLUCOSE SERPL-MCNC: 192 MG/DL (ref 65–140)
GLUCOSE SERPL-MCNC: 195 MG/DL (ref 65–140)
GLUCOSE SERPL-MCNC: 77 MG/DL (ref 65–140)
GLUCOSE SERPL-MCNC: 83 MG/DL (ref 65–140)
GLUCOSE SERPL-MCNC: 83 MG/DL (ref 65–140)
HCT VFR BLD AUTO: 24.6 % (ref 36.5–49.3)
HGB BLD-MCNC: 8.3 G/DL (ref 12–17)
IMM GRANULOCYTES # BLD AUTO: 0.15 THOUSAND/UL (ref 0–0.2)
IMM GRANULOCYTES NFR BLD AUTO: 2 % (ref 0–2)
INR PPP: 1.09 (ref 0.85–1.19)
LYMPHOCYTES # BLD AUTO: 1.73 THOUSANDS/ÂΜL (ref 0.6–4.47)
LYMPHOCYTES NFR BLD AUTO: 19 % (ref 14–44)
MCH RBC QN AUTO: 31.9 PG (ref 26.8–34.3)
MCHC RBC AUTO-ENTMCNC: 33.7 G/DL (ref 31.4–37.4)
MCV RBC AUTO: 95 FL (ref 82–98)
MONOCYTES # BLD AUTO: 1.13 THOUSAND/ÂΜL (ref 0.17–1.22)
MONOCYTES NFR BLD AUTO: 12 % (ref 4–12)
NEUTROPHILS # BLD AUTO: 5.67 THOUSANDS/ÂΜL (ref 1.85–7.62)
NEUTS SEG NFR BLD AUTO: 61 % (ref 43–75)
NRBC BLD AUTO-RTO: 0 /100 WBCS
PLATELET # BLD AUTO: 182 THOUSANDS/UL (ref 149–390)
PMV BLD AUTO: 12.2 FL (ref 8.9–12.7)
POTASSIUM SERPL-SCNC: 4.9 MMOL/L (ref 3.5–5.3)
PROT SERPL-MCNC: 5.6 G/DL (ref 6.4–8.4)
PROTHROMBIN TIME: 14.3 SECONDS (ref 12.3–15)
RBC # BLD AUTO: 2.6 MILLION/UL (ref 3.88–5.62)
SODIUM SERPL-SCNC: 133 MMOL/L (ref 135–147)
WBC # BLD AUTO: 9.14 THOUSAND/UL (ref 4.31–10.16)

## 2025-06-02 PROCEDURE — 80053 COMPREHEN METABOLIC PANEL: CPT | Performed by: INTERNAL MEDICINE

## 2025-06-02 PROCEDURE — 82948 REAGENT STRIP/BLOOD GLUCOSE: CPT

## 2025-06-02 PROCEDURE — 97530 THERAPEUTIC ACTIVITIES: CPT

## 2025-06-02 PROCEDURE — 85025 COMPLETE CBC W/AUTO DIFF WBC: CPT | Performed by: INTERNAL MEDICINE

## 2025-06-02 PROCEDURE — 99232 SBSQ HOSP IP/OBS MODERATE 35: CPT | Performed by: INTERNAL MEDICINE

## 2025-06-02 PROCEDURE — 85610 PROTHROMBIN TIME: CPT | Performed by: INTERNAL MEDICINE

## 2025-06-02 PROCEDURE — 97116 GAIT TRAINING THERAPY: CPT

## 2025-06-02 RX ADMIN — INSULIN ASPART 5 UNITS: 100 INJECTION, SUSPENSION SUBCUTANEOUS at 17:19

## 2025-06-02 RX ADMIN — INSULIN LISPRO 1 UNITS: 100 INJECTION, SOLUTION INTRAVENOUS; SUBCUTANEOUS at 17:19

## 2025-06-02 RX ADMIN — Medication 100 MG: at 08:26

## 2025-06-02 RX ADMIN — HEPARIN SODIUM 5000 UNITS: 5000 INJECTION INTRAVENOUS; SUBCUTANEOUS at 13:42

## 2025-06-02 RX ADMIN — INSULIN LISPRO 1 UNITS: 100 INJECTION, SOLUTION INTRAVENOUS; SUBCUTANEOUS at 08:24

## 2025-06-02 RX ADMIN — AMLODIPINE BESYLATE 5 MG: 5 TABLET ORAL at 16:18

## 2025-06-02 RX ADMIN — INSULIN ASPART 5 UNITS: 100 INJECTION, SUSPENSION SUBCUTANEOUS at 08:24

## 2025-06-02 RX ADMIN — DICLOFENAC SODIUM 2 G: 10 GEL TOPICAL at 22:54

## 2025-06-02 RX ADMIN — CARVEDILOL 6.25 MG: 6.25 TABLET, FILM COATED ORAL at 16:18

## 2025-06-02 RX ADMIN — DICLOFENAC SODIUM 2 G: 10 GEL TOPICAL at 08:26

## 2025-06-02 RX ADMIN — HYDRALAZINE HYDROCHLORIDE 25 MG: 25 TABLET ORAL at 13:42

## 2025-06-02 RX ADMIN — SODIUM BICARBONATE 650 MG TABLET 1300 MG: at 12:00

## 2025-06-02 RX ADMIN — INSULIN LISPRO 1 UNITS: 100 INJECTION, SOLUTION INTRAVENOUS; SUBCUTANEOUS at 12:02

## 2025-06-02 RX ADMIN — HEPARIN SODIUM 5000 UNITS: 5000 INJECTION INTRAVENOUS; SUBCUTANEOUS at 22:59

## 2025-06-02 RX ADMIN — DICLOFENAC SODIUM 2 G: 10 GEL TOPICAL at 12:00

## 2025-06-02 RX ADMIN — AMLODIPINE BESYLATE 5 MG: 5 TABLET ORAL at 08:26

## 2025-06-02 RX ADMIN — LIDOCAINE 1 PATCH: 50 PATCH CUTANEOUS at 08:25

## 2025-06-02 RX ADMIN — SODIUM BICARBONATE 650 MG TABLET 1300 MG: at 08:26

## 2025-06-02 RX ADMIN — FOLIC ACID 1 MG: 1 TABLET ORAL at 08:26

## 2025-06-02 RX ADMIN — CARVEDILOL 6.25 MG: 6.25 TABLET, FILM COATED ORAL at 08:26

## 2025-06-02 RX ADMIN — SODIUM BICARBONATE 650 MG TABLET 1300 MG: at 16:18

## 2025-06-02 RX ADMIN — DICLOFENAC SODIUM 2 G: 10 GEL TOPICAL at 16:17

## 2025-06-02 RX ADMIN — HYDRALAZINE HYDROCHLORIDE 25 MG: 25 TABLET ORAL at 06:26

## 2025-06-02 RX ADMIN — HEPARIN SODIUM 5000 UNITS: 5000 INJECTION INTRAVENOUS; SUBCUTANEOUS at 06:26

## 2025-06-02 NOTE — PLAN OF CARE
Problem: OCCUPATIONAL THERAPY ADULT  Goal: Performs self-care activities at highest level of function for planned discharge setting.  See evaluation for individualized goals.  Description: Treatment Interventions: ADL retraining, UE strengthening/ROM, Functional transfer training, Endurance training, Cognitive reorientation, Patient/family training, Equipment evaluation/education, Compensatory technique education, Continued evaluation          See flowsheet documentation for full assessment, interventions and recommendations.   Outcome: Progressing  Note: Limitation: Decreased ADL status, Decreased UE strength, Decreased Safe judgement during ADL, Decreased cognition, Decreased endurance, Decreased high-level ADLs  Prognosis: Fair  Assessment: Pt seen for skilled OT tx this date. Tx focused on improving strength, activity tolerance and balance, safety awareness to increase independence with self care tasks. Pt tolerated session well. Pt was limited by weakness. Greeted in supine and agreeable. Pt performed LB dressing / bathing supervision ,  bed mobility Pastora, transfers Pastora, mobility with RW supervision. Pt demonstrated fair- standing balance during functional tasks.  Pt demonstrated ability to safely and appropriately attend to all tasks during session.   Pt required minimal verbal cuing during session to safely complete tasks. Current OT DC recommendations for pt is level 3 resources.     Rehab Resource Intensity Level, OT: III (Minimum Resource Intensity)

## 2025-06-02 NOTE — PHYSICAL THERAPY NOTE
Physical Therapy Treatment Note     06/02/25 1124   PT Last Visit   PT Visit Date 06/02/25   Note Type   Note Type Treatment   Pain Assessment   Pain Assessment Tool FLACC   Pain Location/Orientation Location: Back   Restrictions/Precautions   Weight Bearing Precautions Per Order No   Other Precautions Bed Alarm;Hard of hearing;Fall Risk  (language barrier)   General   Chart Reviewed Yes   Subjective   Subjective Pt. agreeable to PT relunctantly. Used Ipad for translation   Bed Mobility   Supine to Sit 6  Modified independent   Additional items Bedrails   Sit to Supine 6  Modified independent   Additional items Bedrails   Transfers   Sit to Stand 6  Modified independent   Stand to Sit 6  Modified independent   Ambulation/Elevation   Gait pattern Improper Weight shift;Narrow ELLIOT;Decreased foot clearance;Excessively slow;Decreased toe off;Decreased heel strike   Gait Assistance 5  Supervision   Additional items Assist x 1   Assistive Device Rolling walker   Distance 150ft   Stair Management Assistance 5  Supervision   Additional items Assist x 1;Increased time required   Stair Management Technique Two rails;Step to pattern;Foreward;Nonreciprocal   Number of Stairs 9   Balance   Static Sitting Good   Dynamic Sitting Fair +   Static Standing Fair   Dynamic Standing Fair -   Ambulatory Fair -   Endurance Deficit   Endurance Deficit No   Activity Tolerance   Activity Tolerance Patient tolerated treatment well   Nurse Made Aware yes   Assessment   Prognosis Good   Problem List Decreased mobility;Pain   Assessment Pt. progressing well with overall mobility. Pt. was very reluctant to get OOB. Noted no LOB t/o session. pt. c/o back pain during amb. Pt. back in bed post session with all needs within reach and bed alarm engaged at the end of the session. Will continue to follow per PT pOC   Barriers to Discharge None   Goals   Patient Goals None reported   STG Expiration Date 06/12/25   PT Treatment Day 1   Plan    Treatment/Interventions Functional transfer training;Elevations;Spoke to nursing;Bed mobility;Gait training;Equipment eval/education;Patient/family training   Progress Progressing toward goals   PT Frequency 3-5x/wk   Discharge Recommendation   Rehab Resource Intensity Level, PT III (Minimum Resource Intensity)   Equipment Recommended Walker   AM-PAC Basic Mobility Inpatient   Turning in Flat Bed Without Bedrails 4   Lying on Back to Sitting on Edge of Flat Bed Without Bedrails 4   Moving Bed to Chair 4   Standing Up From Chair Using Arms 4   Walk in Room 4   Climb 3-5 Stairs With Railing 3   Basic Mobility Inpatient Raw Score 23   Basic Mobility Standardized Score 50.88   [facility] Highest Level Of Mobility   -HL Goal 7: Walk 25 feet or more   -HLM Achieved 7: Walk 25 feet or more   End of Consult   Patient Position at End of Consult All needs within reach;Bed/Chair alarm activated;Supine           Meaghan Mendoza PTA    An AM-PAC basic mobility standardized score less than 42.9 suggest the patient may benefit from discharge to post-acute rehab services.

## 2025-06-02 NOTE — PROGRESS NOTES
Progress Note - Hospitalist   Name: Wes Araujo 55 y.o. male I MRN: [id number]  Unit/Bed#: [place] [address] -01 I Date of Admission: 5/23/2025   Date of Service: 6/2/2025 I Hospital Day: 10    Assessment & Plan  Toxic metabolic encephalopathy  History of atrial fibrillation hypertension alcohol liver disease who presented to the hospital for change in mental status found to have hyperglycemia    Admitted to ICU and stabilized  Altered mental status attributed to toxic metabolic encephalopathy secondary to hyperglycemia with HHNK, acute kidney injury, and acute liver failure  Clinically improved back to baseline  Acute renal failure superimposed on stage 3 chronic kidney disease (HCC)  Patient presented with acute kidney injury with a creatinine of 1.99  History of CKD 3: baseline creatinine approximately 1.5-2.0  Appreciate nephrology evaluation and recommendations: Secondary to prerenal azotemia, diuresis, subsequent ATN versus HRS in the setting of acute liver failure  Creatinine has since worsened to 2.76  Status post IV fluids, IV albumin  Renal ultrasound: No hydronephrosis.  Increased echogenicity consistent with medical renal disease      Results from last 7 days   Lab Units 06/02/25  0523 06/01/25  0525 05/31/25  0508 05/30/25  0533 05/29/25  0459 05/28/25  0508 05/27/25  0430   BUN mg/dL 37* 36* 30* 24 21 22 21   CREATININE mg/dL 2.76* 2.68* 2.67* 2.55* 2.41* 2.53* 2.47*   EGFR ml/min/1.73sq m 24 25 25 27 29 27 28     Abnormal LFTs  Evidence of worsening hyperbilirubinemia, elevated alkaline phosphatase and coagulopathy on admission  No significant transaminitis: Initially mild elevated AST  Being followed by gastroenterology.    Noted to have chronically elevated liver enzymes: Acute rise this admission was attributed to acute alcoholic hepatitis  Discriminant function did not meet criteria for steroids  Per GI: No stigmata of chronic liver disease on exam  Outpatient elastography versus liver biopsy for  definitive diagnosis  Serologic workup negative for etiology of liver injury  Initial coagulopathy resolved with vitamin K  Ruled out infection: Cultures no growth  Renal ultrasound: Moderate ascites  IR consult evaluate for paracentesis    Results from last 7 days   Lab Units 06/02/25  0523 06/01/25  0525 05/31/25  0508 05/30/25  0533 05/29/25  0459 05/28/25  0508 05/27/25  0430   AST U/L 45* 37 35 35 33 43* 49*   ALT U/L 28 24 23 19 22 27 33   TOTAL BILIRUBIN mg/dL 8.63* 9.11* 10.99* 7.84* 7.91* 7.26* 7.14*     Results from last 7 days   Lab Units 06/02/25  0523 05/30/25  0533 05/28/25  0508 05/27/25  0430   INR  1.09 1.07 1.06 1.11     Other specified anemias  No evidence of blood loss.  Transfused 1 unit PRBC 5/28, and again 5/30  EGD and colonoscopy February with dieulafoy lesion but no other sources of bleeding  Evaluated by GI: no current endoscopy recommended  Monitor hemoglobin    Results from last 7 days   Lab Units 06/02/25  0523 06/01/25  0525 05/31/25  0508 05/30/25  0533 05/29/25  0459 05/28/25  0508 05/27/25  0430   HEMOGLOBIN g/dL 8.3* 8.2* 8.9* 6.7* 7.2* 6.5* 7.2*     Primary hypertension  Initially presented with markedly elevated blood pressure secondary to noncompliance  Continue amlodipine 5 mg twice daily, hydralazine 25 mg 3 times daily and new Coreg 6.25 twice daily  Blood pressure adequately controlled  Type 2 diabetes mellitus with hyperglycemia, with long-term current use of insulin (HCC)  Lab Results   Component Value Date    HGBA1C 9.6 (H) 05/26/2025     Recent Labs     06/01/25  2100 06/02/25  0740 06/02/25  1111 06/02/25  1638   POCGLU 143* 195* 192* 192*     HHNK upon arrival likely related to noncompliance with medications and alcohol use  Restarted on 70/30 5 units twice daily   Continue to monitor and titrate as needed  Alcohol abuse  History of alcohol use with withdrawal and withdrawal seizures  Father reported active consumption of alcohol  Initially noted to have altered mental  status, treated in the ICU with phenobarbital for possible alcohol withdrawal  Continue thiamine and folic acid  Case management following for rehab referrals  History of atrial fibrillation  History of atrial fibrillation on metoprolol  Not on anticoagulation at baseline  Hyponatremia  Patient with hyponatremia  Reviewed previous records: Appears to have chronic, intermittent hyponatremia, suspect secondary to free water excess in the setting of alcohol abuse  Appreciate nephrology evaluation and recommendations  Chronic pancreatitis (HCC)  Patient with a history of chronic pancreatitis secondary to alcohol  Initially presented with abdominal pain nausea/vomiting, since improved  Continue diet, and complete alcohol cessation  Continue proton pump inhibitor  Chronic bilateral low back pain with bilateral sciatica  Patient complains of low back pain across his bilateral lower back, radiating down both legs to his toes.  Denies any numbness.  States it has been present for many months  Patient had previous workups with x-rays of lumbar spine  X-ray lumbar spine: No acute abnormality  Continue ambulation  Continue Tylenol/Lidoderm/Voltaren/heating    VTE Pharmacologic Prophylaxis: VTE Score: 3 Moderate Risk (Score 3-4) - Pharmacological DVT Prophylaxis Ordered: heparin.    Mobility:   Basic Mobility Inpatient Raw Score: 23  JH-HLM Goal: 7: Walk 25 feet or more  JH-HLM Achieved: 7: Walk 25 feet or more  JH-HLM Goal achieved. Continue to encourage appropriate mobility.    Patient Centered Rounds: I performed bedside rounds with nursing staff today.   Discussions with Specialists or Other Care Team Provider: ZOILA.      Education and Discussions with Family / Patient: called father Sabra Bennett and LM to call me for update    Current Length of Stay: 10 day(s)  Current Patient Status: Inpatient   Certification Statement: The patient will continue to require additional inpatient hospital stay due to worsening renal  function  Discharge Plan: Anticipate discharge in 48-72 hrs to alcohol rehab    Code Status: Level 1 - Full Code    Subjective   Pt denies any complaints.  Denies any pain.  Denies any n/v/d.  Tolerating po.  Denies any sob    Objective :  Temp:  [97.7 °F (36.5 °C)-97.9 °F (36.6 °C)] 97.9 °F (36.6 °C)  HR:  [70-72] 70  BP: (115-160)/(51-78) 132/71  Resp:  [14-20] 18  SpO2:  [98 %-99 %] 99 %  O2 Device: None (Room air)    Body mass index is 19.53 kg/m².     Input and Output Summary (last 24 hours):     Intake/Output Summary (Last 24 hours) at 6/2/2025 1746  Last data filed at 6/2/2025 1619  Gross per 24 hour   Intake 790 ml   Output 450 ml   Net 340 ml       Physical Exam  Gen: Pleasant male.  Nad  Heart: RRR no m/r/g  Lungs: CTA bilat.  No w/c/r.  No accessory muscle use/resp distress  Abd: soft, mildly distended.  NT.  NABS  Ext: no c/c/e.  2+pedal pulses  Neuro: awake.  Alert.  interactive    Lines/Drains:              Lab Results: I have reviewed the following results:   Results from last 7 days   Lab Units 06/02/25  0523   WBC Thousand/uL 9.14   HEMOGLOBIN g/dL 8.3*   HEMATOCRIT % 24.6*   PLATELETS Thousands/uL 182   SEGS PCT % 61   LYMPHO PCT % 19   MONO PCT % 12   EOS PCT % 5     Results from last 7 days   Lab Units 06/02/25  0523   SODIUM mmol/L 133*   POTASSIUM mmol/L 4.9   CHLORIDE mmol/L 109*   CO2 mmol/L 16*   BUN mg/dL 37*   CREATININE mg/dL 2.76*   ANION GAP mmol/L 8   CALCIUM mg/dL 8.9   ALBUMIN g/dL 3.5   TOTAL BILIRUBIN mg/dL 8.63*   ALK PHOS U/L 391*   ALT U/L 28   AST U/L 45*   GLUCOSE RANDOM mg/dL 83     Results from last 7 days   Lab Units 06/02/25  0523   INR  1.09     Results from last 7 days   Lab Units 06/02/25  1638 06/02/25  1111 06/02/25  0740 06/01/25  2100 06/01/25  1605 06/01/25  1143 06/01/25  0805 05/31/25  2128 05/31/25  1606 05/31/25  1122 05/31/25  0714 05/30/25  2107   POC GLUCOSE mg/dl 192* 192* 195* 143* 180* 178* 201* 120 201* 121 125 129               Recent Cultures (last 7  days):       ===================================================  Imaging    6/1 US kidney/bladder  No hydronephrosis.  Increased echogenicity of the renal parenchyma bilaterally suggestive of medical renal disease.  At least moderate abdominal and pelvic ascites.    6/1 Xray Lumbar spine  No acute osseous abnormality      5/26 chest x-rayLeft lower lobe atelectasis versus infiltrate      5/23 right upper quadrant ultrasound  Heterogeneous appearance of the pancreas, correlates with fatty replacement and calcifications seen on prior CT, possibly sequela of chronic inflammation/chronic pancreatitis.  Liver appears grossly unremarkable.  Small volume hypoechoic ascites, and other findings as above.     5/23 CT head without contrast  No acute intracranial abnormality.      Microbiology   5/26 blood culture: Negative x 2           ==================================================    Last 24 Hours Medication List:     Current Facility-Administered Medications:     acetaminophen (TYLENOL) tablet 325 mg, Q6H PRN    amLODIPine (NORVASC) tablet 5 mg, BID    carvedilol (COREG) tablet 6.25 mg, BID With Meals    Diclofenac Sodium (VOLTAREN) 1 % topical gel 2 g, 4x Daily    folic acid (FOLVITE) tablet 1 mg, Daily    heparin (porcine) subcutaneous injection 5,000 Units, Q8H JAISON    hydrALAZINE (APRESOLINE) injection 10 mg, Q4H PRN    hydrALAZINE (APRESOLINE) tablet 25 mg, Q8H JAISON    insulin aspart protamine-insulin aspart (NovoLOG 70/30) 100 units/mL subcutaneous injection 5 Units, BID AC    insulin lispro (HumALOG/ADMELOG) 100 units/mL subcutaneous injection 1-5 Units, TID AC **AND** Fingerstick Glucose (POCT), TID AC    insulin lispro (HumALOG/ADMELOG) 100 units/mL subcutaneous injection 1-5 Units, HS    lidocaine (LIDODERM) 5 % patch 1 patch, Daily    ondansetron (ZOFRAN) injection 4 mg, Q4H PRN    sodium bicarbonate tablet 1,300 mg, TID after meals    thiamine tablet 100 mg, Daily    Administrative Statements   Today, Patient  Was Seen By: Mojgan Young MD      **Please Note: This note may have been constructed using a voice recognition system.**

## 2025-06-02 NOTE — ASSESSMENT & PLAN NOTE
No evidence of blood loss.  Transfused 1 unit PRBC 5/28, and again 5/30  EGD and colonoscopy February with dieulafoy lesion but no other sources of bleeding  Evaluated by GI: no current endoscopy recommended  Monitor hemoglobin    Results from last 7 days   Lab Units 06/02/25  0523 06/01/25  0525 05/31/25  0508 05/30/25  0533 05/29/25  0459 05/28/25  0508 05/27/25  0430   HEMOGLOBIN g/dL 8.3* 8.2* 8.9* 6.7* 7.2* 6.5* 7.2*

## 2025-06-02 NOTE — ASSESSMENT & PLAN NOTE
Patient presented with acute kidney injury with a creatinine of 1.99  History of CKD 3: baseline creatinine approximately 1.5-2.0  Appreciate nephrology evaluation and recommendations: Secondary to prerenal azotemia, diuresis, subsequent ATN versus HRS in the setting of acute liver failure  Creatinine has since worsened to 2.76  Status post IV fluids, IV albumin  Renal ultrasound: No hydronephrosis.  Increased echogenicity consistent with medical renal disease      Results from last 7 days   Lab Units 06/02/25  0523 06/01/25  0525 05/31/25  0508 05/30/25  0533 05/29/25  0459 05/28/25  0508 05/27/25  0430   BUN mg/dL 37* 36* 30* 24 21 22 21   CREATININE mg/dL 2.76* 2.68* 2.67* 2.55* 2.41* 2.53* 2.47*   EGFR ml/min/1.73sq m 24 25 25 27 29 27 28

## 2025-06-02 NOTE — PLAN OF CARE
Problem: PHYSICAL THERAPY ADULT  Goal: Performs mobility at highest level of function for planned discharge setting.  See evaluation for individualized goals.  Description: Treatment/Interventions: Functional transfer training, LE strengthening/ROM, Elevations, Therapeutic exercise, Endurance training, Patient/family training, Bed mobility, Gait training, Spoke to nursing, OT  Equipment Recommended: Walker       See flowsheet documentation for full assessment, interventions and recommendations.  Outcome: Progressing  Note: Prognosis: Good  Problem List: Decreased mobility, Pain  Assessment: Pt. progressing well with overall mobility. Pt. was very reluctant to get OOB. Noted no LOB t/o session. pt. c/o back pain during amb. Pt. back in bed post session with all needs within reach and bed alarm engaged at the end of the session. Will continue to follow per PT pOC  Barriers to Discharge: None  Barriers to Discharge Comments: home alone; (+)stairs  Rehab Resource Intensity Level, PT: III (Minimum Resource Intensity)    See flowsheet documentation for full assessment.

## 2025-06-02 NOTE — OCCUPATIONAL THERAPY NOTE
Occupational Therapy Progress Note     Patient Name: Wes Araujo  Today's Date: 6/2/2025  Problem List  Principal Problem:    Toxic metabolic encephalopathy  Active Problems:    Primary hypertension    Type 2 diabetes mellitus with hyperglycemia, with long-term current use of insulin (HCC)    Alcohol abuse    Chronic pancreatitis (HCC)    Metabolic acidosis    Hyponatremia    History of atrial fibrillation    Acute renal failure superimposed on stage 3 chronic kidney disease (HCC)    Abnormal LFTs    Electrolyte abnormality    Divehi  needed    Acute kidney injury (HCC)    CKD stage 3b, GFR 30-44 ml/min (HCC)    Other specified anemias    Elevated LFTs    Hyperkalemia    Chronic bilateral low back pain with bilateral sciatica          06/02/25 0935   OT Last Visit   OT Visit Date 06/02/25   Note Type   Note Type Treatment   Pain Assessment   Pain Assessment Tool FLACC   Pain Rating: FLACC (Rest) - Face 0   Pain Rating: FLACC (Rest) - Legs 0   Pain Rating: FLACC (Rest) - Activity 0   Pain Rating: FLACC (Rest) - Cry 0   Pain Rating: FLACC (Rest) - Consolability 0   Score: FLACC (Rest) 0   Pain Rating: FLACC (Activity) - Face 1   Pain Rating: FLACC (Activity) - Legs 0   Pain Rating: FLACC (Activity) - Activity 1   Pain Rating: FLACC (Activity) - Cry 0   Pain Rating: FLACC (Activity) - Consolability 0   Score: FLACC (Activity) 2   Restrictions/Precautions   Weight Bearing Precautions Per Order No   Other Precautions Chair Alarm;Bed Alarm;Multiple lines;Fall Risk;Pain   ADL   Where Assessed Edge of bed   LB Dressing Assistance 5  Supervision/Setup   Functional Standing Tolerance   Time 2-3 min   Activity mobility.   Comments RW for support   Bed Mobility   Supine to Sit 6  Modified independent   Sit to Supine 6  Modified independent   Transfers   Sit to Stand 6  Modified independent   Stand to Sit 6  Modified independent   Functional Mobility   Functional Mobility 5  Supervision   Additional  Comments Ax1, RW. functional distances   Additional items Rolling walker   Cognition   Overall Cognitive Status Impaired   Arousal/Participation Cooperative   Attention Attends with cues to redirect   Orientation Level Oriented to person;Oriented to place;Oriented to situation   Memory Decreased recall of recent events;Decreased recall of precautions   Following Commands Follows one step commands without difficulty   Activity Tolerance   Activity Tolerance Patient limited by fatigue;Patient limited by pain   Medical Staff Made Aware RN   Assessment   Assessment Pt seen for skilled OT tx this date. Tx focused on improving strength, activity tolerance and balance, safety awareness to increase independence with self care tasks. Pt tolerated session well. Pt was limited by weakness. Greeted in supine and agreeable. Pt performed LB dressing / bathing supervision ,  bed mobility Past[place], transfers [place], mobility with RW supervision. Pt demonstrated fair- standing balance during functional tasks.  Pt demonstrated ability to safely and appropriately attend to all tasks during session.   Pt required minimal verbal cuing during session to safely complete tasks. Current OT DC recommendations for pt is level 3 resources.   Plan   Treatment Interventions ADL retraining;UE strengthening/ROM;Functional transfer training;Endurance training;Cognitive reorientation;Patient/family training;Equipment evaluation/education;Compensatory technique education;Continued evaluation   Goal Expiration Date 06/06/25   OT Treatment Day 2   OT Frequency 2-3x/wk;3-5x/wk   Discharge Recommendation   Rehab Resource Intensity Level, OT III (Minimum Resource Intensity)   AM-PAC Daily Activity Inpatient   Lower Body Dressing 3   Bathing 3   Toileting 3   Upper Body Dressing 4   Grooming 4   Eating 4   Daily Activity Raw Score 21   Daily Activity Standardized Score (Calc for Raw Score >=11) 44.27   AM-PAC Applied Cognition Inpatient   Following a  Speech/Presentation 3   Understanding Ordinary Conversation 3   Taking Medications 2   Remembering Where Things Are Placed or Put Away 2   Remembering List of 4-5 Errands 2   Taking Care of Complicated Tasks 2   Applied Cognition Raw Score 14   Applied Cognition Standardized Score 32.02     Kelly Hamilton, OT

## 2025-06-02 NOTE — ASSESSMENT & PLAN NOTE
ZENAIDA most likely secondary to prerenal azotemia secondary to osmotic diuresis plus some component of intravascular volume depletion in light of hypoalbuminemia with subsequent ATN versus HRS in the presence of acute liver injury  After review of records it appears that the patient has a baseline Creatinine of 1.5-2.0 mg/dL.  Admission creatinine of 1.99 mg/dL on 5/23.  Status post albumin with minimal improvement.  Urine sodium 48, urine microscopy 10-20 RBCs, 4-10 white cells and 10-25 hyaline cast.

## 2025-06-02 NOTE — PROGRESS NOTES
NEPHROLOGY HOSPITAL PROGRESS NOTE   Wes Araujo 55 y.o. male MRN: [id number]  Unit/Bed#: [place] [id number] -01 Encounter: [id number]  Reason for Consult: ZENAIAD    Assessment & Plan  Acute kidney injury (HCC)  ZENAIDA most likely secondary to prerenal azotemia secondary to osmotic diuresis plus some component of intravascular volume depletion in light of hypoalbuminemia with subsequent ATN versus HRS in the presence of acute liver injury  After review of records it appears that the patient has a baseline Creatinine of 1.5-2.0 mg/dL.  Admission creatinine of 1.99 mg/dL on 5/23.  Status post albumin with minimal improvement.  Urine sodium 48, urine microscopy 10-20 RBCs, 4-10 white cells and 10-25 hyaline cast.  CKD stage 3b, GFR 30-44 ml/min ([facility])  After review of records it appears that the patient has a baseline Creatinine of 1.5-2.0 mg/dL  Likely has underlying CKD secondary to poorly controlled diabetes plus hypertensive nephrosclerosis plus age-related nephron loss  Hyponatremia  Stable at 133.  Primary hypertension  BP improved  Optimize hemodynamic status to avoid renal ischemic injury.  Avoid BP fluctuations.  Home medications: Hydralazine 25 mg p.o. every 8, Norvasc 5 mg p.o. twice daily  Current medications: coreg 6.25 mg BID, Norvasc 5 mg p.o. twice daily, hydralazine 25 mg p.o. every 8h  Type 2 diabetes mellitus with hyperglycemia, with long-term current use of insulin ([facility])  Lab Results   Component Value Date    HGBA1C 9.6 (H) 05/26/2025   On insulin  Recommend tight glycemic control  Alcohol abuse  Monitor for withdrawal  Alcohol rehab on discharge  History of atrial fibrillation  Follow-up with cardiology  Abnormal LFTs  Management as per gastroenterology.  Other specified anemias  Transfuse as needed, s/p PRBC already this admission  Maintain hemoglobin greater than 8 grams/deciliter  Follow-up with primary team  Hyperkalemia  Stable currently 4.9  Metabolic acidosis  Continue with sodium bicarbonate  tablets.  Chronic bilateral low back pain with bilateral sciatica      Summary:  Renal function overall appears to be fairly stable with a creatinine 2.76  Continue with sodium bicarbonate tablets  Continued antihypertensive regimen  No current need for albumin or further IV fluids  Hopeful improvement given time.    SUBJECTIVE / 24H INTERVAL HISTORY:  Complains of persistent lower back discomfort.   utilized #[id number]  Denies any chest pain or shortness of breath.  Appetite is stable.    OBJECTIVE:  Current Weight: Weight - Scale: 60 kg (132 lb 4.4 oz)  Vitals:    06/01/25 2155 06/02/25 0530 06/02/25 0600 06/02/25 0739   BP: 115/51 160/78  131/67   BP Location:       Pulse: 70 71  72   Resp: 20   14   Temp: 97.7 °F (36.5 °C)   97.9 °F (36.6 °C)   TempSrc:       SpO2: 98% 99%  99%   Weight:   60 kg (132 lb 4.4 oz)    Height:           Intake/Output Summary (Last 24 hours) at 6/2/2025 1339  Last data filed at 6/2/2025 1329  Gross per 24 hour   Intake 790 ml   Output 250 ml   Net 540 ml       Physical Exam    Cardiovascular:      Rate and Rhythm: Normal rate and regular rhythm.   Pulmonary:      Effort: Pulmonary effort is normal.      Breath sounds: Normal breath sounds.   Abdominal:      General: There is distension.      Palpations: Abdomen is soft.      Tenderness: There is no abdominal tenderness. There is no guarding.     Musculoskeletal:      Right lower leg: No edema.      Left lower leg: No edema.     Skin:     General: Skin is warm and dry.      Findings: No rash.     Neurological:      Mental Status: He is alert and oriented to person, place, and time.         Medications:  Current Medications[1]    Laboratory Results:  Results from last 7 days   Lab Units 06/02/25  0523 06/01/25  0525 05/31/25  0508 05/30/25  0533 05/29/25  0459 05/28/25  0508 05/27/25  0430   WBC Thousand/uL 9.14 9.92 11.51* 9.68 9.70 9.29 11.67*   HEMOGLOBIN g/dL 8.3* 8.2* 8.9* 6.7* 7.2* 6.5* 7.2*   HEMATOCRIT % 24.6* 23.7* 25.8*  "19.7* 20.9* 19.5* 21.8*   PLATELETS Thousands/uL 182 195 220 208 219 243 261   POTASSIUM mmol/L 4.9 5.0 4.8 5.3 5.6* 5.1 5.0   CHLORIDE mmol/L 109* 108 108 108 109* 109* 105   CO2 mmol/L 16* 17* 20* 16* 21 21 21   BUN mg/dL 37* 36* 30* 24 21 22 21   CREATININE mg/dL 2.76* 2.68* 2.67* 2.55* 2.41* 2.53* 2.47*   CALCIUM mg/dL 8.9 8.8 9.1 8.5 9.0 8.5 8.1*   MAGNESIUM mg/dL  --  1.9  --   --   --  2.3  --    PHOSPHORUS mg/dL  --  3.3 3.0  --  2.4* 2.8  --        Portions of the record may have been created with voice recognition software. Occasional wrong word or \"sound a like\" substitutions may have occurred due to the inherent limitations of voice recognition software. Read the chart carefully and recognize, using context, where substitutions have occurred.If you have any questions, please contact the dictating provider.       [1]   Current Facility-Administered Medications:     acetaminophen (TYLENOL) tablet 325 mg, 325 mg, Oral, Q6H PRN, ARIANE Moore, 325 mg at 06/01/25 1029    amLODIPine (NORVASC) tablet 5 mg, 5 mg, Oral, BID, Bibi Solis MD, 5 mg at 06/02/25 0826    carvedilol (COREG) tablet 6.25 mg, 6.25 mg, Oral, BID With Meals, Sonja Siegel PA-C, 6.25 mg at 06/02/25 0826    Diclofenac Sodium (VOLTAREN) 1 % topical gel 2 g, 2 g, Topical, 4x Daily, ARIANE Moore, 2 g at 06/02/25 1200    folic acid (FOLVITE) tablet 1 mg, 1 mg, Oral, Daily, Bala Leroy DO, 1 mg at 06/02/25 0826    heparin (porcine) subcutaneous injection 5,000 Units, 5,000 Units, Subcutaneous, Q8H JAISON, Mojgan Young MD, 5,000 Units at 06/02/25 0626    hydrALAZINE (APRESOLINE) injection 10 mg, 10 mg, Intravenous, Q4H PRN, ARIANE Leggett, 10 mg at 05/25/25 0318    hydrALAZINE (APRESOLINE) tablet 25 mg, 25 mg, Oral, Q8H AJISON, Sonja Siegel PA-C, 25 mg at 06/02/25 0626    insulin aspart protamine-insulin aspart (NovoLOG 70/30) 100 units/mL subcutaneous injection 5 Units, 5 Units, Subcutaneous, BID AC, Veronica Petersen, " ARIANE, 5 Units at 06/02/25 0824    insulin lispro (HumALOG/ADMELOG) 100 units/mL subcutaneous injection 1-5 Units, 1-5 Units, Subcutaneous, TID AC, 1 Units at 06/02/25 1202 **AND** Fingerstick Glucose (POCT), , , TID ACVeronica CRNP    insulin lispro (HumALOG/ADMELOG) 100 units/mL subcutaneous injection 1-5 Units, 1-5 Units, Subcutaneous, HS, ARIANE Leggett, 1 Units at 05/29/25 2233    lidocaine (LIDODERM) 5 % patch 1 patch, 1 patch, Topical, Daily, ARIANE Moore, 1 patch at 06/02/25 0825    ondansetron (ZOFRAN) injection 4 mg, 4 mg, Intravenous, Q4H PRN, Bala Leroy DO    sodium bicarbonate tablet 1,300 mg, 1,300 mg, Oral, TID after meals, Sonja Siegel PA-C, 1,300 mg at 06/02/25 1200    thiamine tablet 100 mg, 100 mg, Oral, Daily, Bala Leroy DO, 100 mg at 06/02/25 0826

## 2025-06-02 NOTE — CASE MANAGEMENT
Case Management Discharge Planning Note    Patient name Wes Araujo  Location [facility] 2 M* MRN [id number]  : 1970 Date 2025       Current Admission Date: 2025  Current Admission Diagnosis:Toxic metabolic encephalopathy   Patient Active Problem List    Diagnosis Date Noted    Chronic bilateral low back pain with bilateral sciatica 2025    Hyperkalemia 2025    Acute kidney injury (HCC) 2025    CKD stage 3b, GFR 30-44 ml/min (HCC) 2025    Other specified anemias 2025    Elevated LFTs 2025    Vietnamese  needed 2025    Electrolyte abnormality 2025    Abnormal LFTs 2025    Abdominal pain 2025    CKD stage 3a, GFR 45-59 ml/min (HCC) 2025    Alcohol-induced chronic pancreatitis (HCC) 2024    Toxic metabolic encephalopathy 2024    Seizure (HCC) 2022    Bilateral hearing loss 2020    Acute renal failure superimposed on stage 3 chronic kidney disease (HCC) 2019    Anemia 2019    Hyponatremia 2019    History of atrial fibrillation 2019    Metabolic acidosis 2019    ZENAIDA (acute kidney injury) (HCC) 2019    Primary hypertension     Type 2 diabetes mellitus with hyperglycemia, with long-term current use of insulin (HCC)     Alcohol abuse     Paroxysmal A-fib (HCC)     Chronic pancreatitis (HCC)       LOS (days): 10  Geometric Mean LOS (GMLOS) (days):   Days to GMLOS:     OBJECTIVE:  Risk of Unplanned Readmission Score: 36         Current admission status: Inpatient   Preferred Pharmacy:    [facility]  [facility] - [address] [place] [id number]  Phone: [phone] Fax: [phone]    [facility]  [facility] - [address]  [facility],  [address]  [facility],  First Floor [facility] [address]  Phone: [phone] Fax: [phone]    [facility] #[id number] - [facility] - [address]  [facility] [address]  Phone: [phone] Fax: [phone]    Primary Care Provider: Rush Kebede MD    Primary Insurance: [employer]  Secondary Insurance:     DISCHARGE DETAILS:     CM followed up on [facility] referral. SIMON, H&P and facesheet needed.     CM met with patient at bedside who signed SIMON. Information sent to [facility]. CM to follow through discharge.

## 2025-06-02 NOTE — ASSESSMENT & PLAN NOTE
BP improved  Optimize hemodynamic status to avoid renal ischemic injury.  Avoid BP fluctuations.  Home medications: Hydralazine 25 mg p.o. every 8, Norvasc 5 mg p.o. twice daily  Current medications: coreg 6.25 mg BID, Norvasc 5 mg p.o. twice daily, hydralazine 25 mg p.o. every 8h

## 2025-06-02 NOTE — ASSESSMENT & PLAN NOTE
After review of records it appears that the patient has a baseline Creatinine of 1.5-2.0 mg/dL  Likely has underlying CKD secondary to poorly controlled diabetes plus hypertensive nephrosclerosis plus age-related nephron loss

## 2025-06-02 NOTE — ASSESSMENT & PLAN NOTE
Lab Results   Component Value Date    HGBA1C 9.6 (H) 05/26/2025     Recent Labs     06/01/25  2100 06/02/25  0740 06/02/25  1111 06/02/25  1638   POCGLU 143* 195* 192* 192*     HHNK upon arrival likely related to noncompliance with medications and alcohol use  Restarted on 70/30 5 units twice daily   Continue to monitor and titrate as needed

## 2025-06-02 NOTE — PLAN OF CARE
Problem: PAIN - ADULT  Goal: Verbalizes/displays adequate comfort level or baseline comfort level  Description: Interventions:  - Encourage patient to monitor pain and request assistance  - Assess pain using appropriate pain scale  - Administer analgesics as ordered based on type and severity of pain and evaluate response  - Implement non-pharmacological measures as appropriate and evaluate response  - Consider cultural and social influences on pain and pain management  - Notify physician/advanced practitioner if interventions unsuccessful or patient reports new pain  - Educate patient/family on pain management process including their role and importance of  reporting pain   - Provide non-pharmacologic/complimentary pain relief interventions  6/1/2025 2037 by Mary Vick RN  Outcome: Progressing  6/1/2025 2037 by Mary Vick RN  Outcome: Progressing     Problem: SAFETY ADULT  Goal: Patient will remain free of falls  Description: INTERVENTIONS:  - Educate patient/family on patient safety including physical limitations  - Instruct patient to call for assistance with activity   - Consider consulting OT/PT to assist with strengthening/mobility based on AM PAC & JH-HLM score  - Consult OT/PT to assist with strengthening/mobility   - Keep Call bell within reach  - Keep bed low and locked with side rails adjusted as appropriate  - Keep care items and personal belongings within reach  - Initiate and maintain comfort rounds  - Make Fall Risk Sign visible to staff  - Offer Toileting every 2 Hours, in advance of need  - Initiate/Maintain bed alarm  - Obtain necessary fall risk management equipment: alarms  - Apply yellow socks and bracelet for high fall risk patients  - Consider moving patient to room near nurses station  6/1/2025 2037 by Mary Vick RN  Outcome: Progressing  6/1/2025 2037 by Mary Vick RN  Outcome: Progressing  Goal: Maintain or return to baseline ADL function  Description:  INTERVENTIONS:  -  Assess patient's ability to carry out ADLs; assess patient's baseline for ADL function and identify physical deficits which impact ability to perform ADLs (bathing, care of mouth/teeth, toileting, grooming, dressing, etc.)  - Assess/evaluate cause of self-care deficits   - Assess range of motion  - Assess patient's mobility; develop plan if impaired  - Assess patient's need for assistive devices and provide as appropriate  - Encourage maximum independence but intervene and supervise when necessary  - Involve family in performance of ADLs  - Assess for home care needs following discharge   - Consider OT consult to assist with ADL evaluation and planning for discharge  - Provide patient education as appropriate  - Monitor functional capacity and physical performance, use of AM PAC & JH-HLM   - Monitor gait, balance and fatigue with ambulation    6/1/2025 2037 by Mary Vick RN  Outcome: Progressing  6/1/2025 2037 by Mary Vick RN  Outcome: Progressing  Goal: Maintains/Returns to pre admission functional level  Description: INTERVENTIONS:  - Perform AM-PAC 6 Click Basic Mobility/ Daily Activity assessment daily.  - Set and communicate daily mobility goal to care team and patient/family/caregiver.   - Collaborate with rehabilitation services on mobility goals if consulted  - Perform Range of Motion 4 times a day.  - Reposition patient every 2 hours.  - Dangle patient 3 times a day  - Stand patient 3 times a day  - Ambulate patient 3 times a day  - Out of bed to chair 3 times a day   - Out of bed for meals 3 times a day  - Out of bed for toileting  - Record patient progress and toleration of activity level   6/1/2025 2037 by Mary Vick RN  Outcome: Progressing  6/1/2025 2037 by Mary Vick RN  Outcome: Progressing     Problem: DISCHARGE PLANNING  Goal: Discharge to home or other facility with appropriate resources  Description: INTERVENTIONS:  - Identify barriers to  discharge w/patient and caregiver  - Arrange for needed discharge resources and transportation as appropriate  - Identify discharge learning needs (meds, wound care, etc.)  - Arrange for interpretive services to assist at discharge as needed  - Refer to Case Management Department for coordinating discharge planning if the patient needs post-hospital services based on physician/advanced practitioner order or complex needs related to functional status, cognitive ability, or social support system  6/1/2025 2037 by Mary Vick RN  Outcome: Progressing  6/1/2025 2037 by Mary Vick RN  Outcome: Progressing     Problem: Knowledge Deficit  Goal: Patient/family/caregiver demonstrates understanding of disease process, treatment plan, medications, and discharge instructions  Description: Complete learning assessment and assess knowledge base.  Interventions:  - Provide teaching at level of understanding  - Provide teaching via preferred learning methods  6/1/2025 2037 by Mary Vick RN  Outcome: Progressing  6/1/2025 2037 by Mary Vick RN  Outcome: Progressing     Problem: GASTROINTESTINAL - ADULT  Goal: Minimal or absence of nausea and/or vomiting  Description: INTERVENTIONS:  - Administer IV fluids if ordered to ensure adequate hydration  - Maintain NPO status until nausea and vomiting are resolved  - Nasogastric tube if ordered  - Administer ordered antiemetic medications as needed  - Provide nonpharmacologic comfort measures as appropriate  - Advance diet as tolerated, if ordered  - Consider nutrition services referral to assist patient with adequate nutrition and appropriate food choices  6/1/2025 2037 by Mary Vick RN  Outcome: Progressing  6/1/2025 2037 by Mary Vick RN  Outcome: Progressing  Goal: Maintains adequate nutritional intake  Description: INTERVENTIONS:  - Monitor percentage of each meal consumed  - Identify factors contributing to decreased intake, treat as  appropriate  - Assist with meals as needed  - Monitor I&O, weight, and lab values if indicated  - Obtain nutrition services referral as needed  6/1/2025 2037 by Mary Vick RN  Outcome: Progressing  6/1/2025 2037 by Mary Vick RN  Outcome: Progressing     Problem: METABOLIC, FLUID AND ELECTROLYTES - ADULT  Goal: Electrolytes maintained within normal limits  Description: INTERVENTIONS:  - Monitor labs and assess patient for signs and symptoms of electrolyte imbalances  - Administer electrolyte replacement as ordered  - Monitor response to electrolyte replacements, including repeat lab results as appropriate  - Instruct patient on fluid and nutrition as appropriate  6/1/2025 2037 by Mary Vick RN  Outcome: Progressing  6/1/2025 2037 by Mary Vick RN  Outcome: Progressing  Goal: Fluid balance maintained  Description: INTERVENTIONS:  - Monitor labs   - Monitor I/O and WT  - Instruct patient on fluid and nutrition as appropriate  - Assess for signs & symptoms of volume excess or deficit  Outcome: Progressing  Goal: Glucose maintained within target range  Description: INTERVENTIONS:  - Monitor Blood Glucose as ordered  - Assess for signs and symptoms of hyperglycemia and hypoglycemia  - Administer ordered medications to maintain glucose within target range  - Assess nutritional intake and initiate nutrition service referral as needed  Outcome: Progressing     Problem: HEMATOLOGIC - ADULT  Goal: Maintains hematologic stability  Description: INTERVENTIONS  - Assess for signs and symptoms of bleeding or hemorrhage  - Monitor labs  - Administer supportive blood products/factors as ordered and appropriate  Outcome: Progressing     Problem: MUSCULOSKELETAL - ADULT  Goal: Maintain or return mobility to safest level of function  Description: INTERVENTIONS:  - Assess patient's ability to carry out ADLs; assess patient's baseline for ADL function and identify physical deficits which impact ability  to perform ADLs (bathing, care of mouth/teeth, toileting, grooming, dressing, etc.)  - Assess/evaluate cause of self-care deficits   - Assess range of motion  - Assess patient's mobility  - Assess patient's need for assistive devices and provide as appropriate  - Encourage maximum independence but intervene and supervise when necessary  - Involve family in performance of ADLs  - Assess for home care needs following discharge   - Consider OT consult to assist with ADL evaluation and planning for discharge  - Provide patient education as appropriate  Outcome: Progressing  Goal: Maintain proper alignment of affected body part  Description: INTERVENTIONS:  - Support, maintain and protect limb and body alignment  - Provide patient/ family with appropriate education  Outcome: Progressing     Problem: MOBILITY - ADULT  Goal: Maintain or return to baseline ADL function  Description: INTERVENTIONS:  -  Assess patient's ability to carry out ADLs; assess patient's baseline for ADL function and identify physical deficits which impact ability to perform ADLs (bathing, care of mouth/teeth, toileting, grooming, dressing, etc.)  - Assess/evaluate cause of self-care deficits   - Assess range of motion  - Assess patient's mobility; develop plan if impaired  - Assess patient's need for assistive devices and provide as appropriate  - Encourage maximum independence but intervene and supervise when necessary  - Involve family in performance of ADLs  - Assess for home care needs following discharge   - Consider OT consult to assist with ADL evaluation and planning for discharge  - Provide patient education as appropriate  - Monitor functional capacity and physical performance, use of AM PAC & JH-HLM   - Monitor gait, balance and fatigue with ambulation    6/1/2025 2037 by Mary Vick RN  Outcome: Progressing  6/1/2025 2037 by Mary Vick RN  Outcome: Progressing  Goal: Maintains/Returns to pre admission functional  level  Description: INTERVENTIONS:  - Perform AM-PAC 6 Click Basic Mobility/ Daily Activity assessment daily.  - Set and communicate daily mobility goal to care team and patient/family/caregiver.   - Collaborate with rehabilitation services on mobility goals if consulted  - Perform Range of Motion 4 times a day.  - Reposition patient every 2 hours.  - Dangle patient 3 times a day  - Stand patient 3 times a day  - Ambulate patient 3 times a day  - Out of bed to chair 3 times a day   - Out of bed for meals 3 times a day  - Out of bed for toileting  - Record patient progress and toleration of activity level   6/1/2025 2037 by Mary Vick RN  Outcome: Progressing  6/1/2025 2037 by Mary Vick RN  Outcome: Progressing

## 2025-06-02 NOTE — ASSESSMENT & PLAN NOTE
Evidence of worsening hyperbilirubinemia, elevated alkaline phosphatase and coagulopathy on admission  No significant transaminitis: Initially mild elevated AST  Being followed by gastroenterology.    Noted to have chronically elevated liver enzymes: Acute rise this admission was attributed to acute alcoholic hepatitis  Discriminant function did not meet criteria for steroids  Per GI: No stigmata of chronic liver disease on exam  Outpatient elastography versus liver biopsy for definitive diagnosis  Serologic workup negative for etiology of liver injury  Initial coagulopathy resolved with vitamin K  Ruled out infection: Cultures no growth  Renal ultrasound: Moderate ascites  IR consult evaluate for paracentesis    Results from last 7 days   Lab Units 06/02/25  0523 06/01/25  0525 05/31/25  0508 05/30/25  0533 05/29/25  0459 05/28/25  0508 05/27/25  0430   AST U/L 45* 37 35 35 33 43* 49*   ALT U/L 28 24 23 19 22 27 33   TOTAL BILIRUBIN mg/dL 8.63* 9.11* 10.99* 7.84* 7.91* 7.26* 7.14*     Results from last 7 days   Lab Units 06/02/25  0523 05/30/25  0533 05/28/25  0508 05/27/25  0430   INR  1.09 1.07 1.06 1.11

## 2025-06-02 NOTE — ASSESSMENT & PLAN NOTE
Patient complains of low back pain across his bilateral lower back, radiating down both legs to his toes.  Denies any numbness.  States it has been present for many months  Patient had previous workups with x-rays of lumbar spine  X-ray lumbar spine: No acute abnormality  Continue ambulation  Continue Tylenol/Lidoderm/Voltaren/heating

## 2025-06-03 ENCOUNTER — APPOINTMENT (INPATIENT)
Dept: MRI IMAGING | Facility: HOSPITAL | Age: 55
DRG: 280 | End: 2025-06-03
Payer: COMMERCIAL

## 2025-06-03 ENCOUNTER — APPOINTMENT (INPATIENT)
Dept: RADIOLOGY | Facility: HOSPITAL | Age: 55
DRG: 280 | End: 2025-06-03
Attending: INTERNAL MEDICINE
Payer: COMMERCIAL

## 2025-06-03 PROBLEM — G92.8 TOXIC METABOLIC ENCEPHALOPATHY: Status: RESOLVED | Noted: 2024-01-25 | Resolved: 2025-06-03

## 2025-06-03 LAB
ALBUMIN FLD-MCNC: <1.5 G/DL
ALBUMIN SERPL BCG-MCNC: 3.5 G/DL (ref 3.5–5)
ALP SERPL-CCNC: 409 U/L (ref 34–104)
ALT SERPL W P-5'-P-CCNC: 31 U/L (ref 7–52)
ANION GAP SERPL CALCULATED.3IONS-SCNC: 9 MMOL/L (ref 4–13)
APPEARANCE FLD: CLEAR
AST SERPL W P-5'-P-CCNC: 49 U/L (ref 13–39)
BILIRUB SERPL-MCNC: 8.47 MG/DL (ref 0.2–1)
BUN SERPL-MCNC: 39 MG/DL (ref 5–25)
CALCIUM SERPL-MCNC: 9 MG/DL (ref 8.4–10.2)
CHLORIDE SERPL-SCNC: 110 MMOL/L (ref 96–108)
CO2 SERPL-SCNC: 15 MMOL/L (ref 21–32)
COLOR FLD: YELLOW
CREAT SERPL-MCNC: 2.94 MG/DL (ref 0.6–1.3)
GFR SERPL CREATININE-BSD FRML MDRD: 22 ML/MIN/1.73SQ M
GLUCOSE FLD-MCNC: 176 MG/DL
GLUCOSE SERPL-MCNC: 101 MG/DL (ref 65–140)
GLUCOSE SERPL-MCNC: 156 MG/DL (ref 65–140)
GLUCOSE SERPL-MCNC: 183 MG/DL (ref 65–140)
GLUCOSE SERPL-MCNC: 205 MG/DL (ref 65–140)
GLUCOSE SERPL-MCNC: 272 MG/DL (ref 65–140)
HISTIOCYTES NFR FLD: 19 %
LDH FLD L TO P-CCNC: 37 U/L
LYMPHOCYTES NFR BLD AUTO: 81 %
POTASSIUM SERPL-SCNC: 5 MMOL/L (ref 3.5–5.3)
PROT FLD-MCNC: <3 G/DL
PROT SERPL-MCNC: 5.7 G/DL (ref 6.4–8.4)
SITE: NORMAL
SODIUM SERPL-SCNC: 134 MMOL/L (ref 135–147)
SODIUM UR-SCNC: 46 MMOL/L
TOTAL CELLS COUNTED SPEC: 100
TOTAL PROTEIN FLUID: 0.7 G/DL
WBC # FLD MANUAL: 427 /UL

## 2025-06-03 PROCEDURE — 99232 SBSQ HOSP IP/OBS MODERATE 35: CPT | Performed by: INTERNAL MEDICINE

## 2025-06-03 PROCEDURE — 88112 CYTOPATH CELL ENHANCE TECH: CPT | Performed by: STUDENT IN AN ORGANIZED HEALTH CARE EDUCATION/TRAINING PROGRAM

## 2025-06-03 PROCEDURE — 82042 OTHER SOURCE ALBUMIN QUAN EA: CPT | Performed by: INTERNAL MEDICINE

## 2025-06-03 PROCEDURE — 80053 COMPREHEN METABOLIC PANEL: CPT | Performed by: INTERNAL MEDICINE

## 2025-06-03 PROCEDURE — 99233 SBSQ HOSP IP/OBS HIGH 50: CPT | Performed by: INTERNAL MEDICINE

## 2025-06-03 PROCEDURE — 49083 ABD PARACENTESIS W/IMAGING: CPT

## 2025-06-03 PROCEDURE — NC001 PR NO CHARGE: Performed by: PHYSICIAN ASSISTANT

## 2025-06-03 PROCEDURE — 84157 ASSAY OF PROTEIN OTHER: CPT | Performed by: INTERNAL MEDICINE

## 2025-06-03 PROCEDURE — 83615 LACTATE (LD) (LDH) ENZYME: CPT | Performed by: INTERNAL MEDICINE

## 2025-06-03 PROCEDURE — 88342 IMHCHEM/IMCYTCHM 1ST ANTB: CPT | Performed by: STUDENT IN AN ORGANIZED HEALTH CARE EDUCATION/TRAINING PROGRAM

## 2025-06-03 PROCEDURE — 88305 TISSUE EXAM BY PATHOLOGIST: CPT | Performed by: STUDENT IN AN ORGANIZED HEALTH CARE EDUCATION/TRAINING PROGRAM

## 2025-06-03 PROCEDURE — 87205 SMEAR GRAM STAIN: CPT | Performed by: INTERNAL MEDICINE

## 2025-06-03 PROCEDURE — 89051 BODY FLUID CELL COUNT: CPT | Performed by: INTERNAL MEDICINE

## 2025-06-03 PROCEDURE — 87070 CULTURE OTHR SPECIMN AEROBIC: CPT | Performed by: INTERNAL MEDICINE

## 2025-06-03 PROCEDURE — 82948 REAGENT STRIP/BLOOD GLUCOSE: CPT

## 2025-06-03 PROCEDURE — 0W9G3ZZ DRAINAGE OF PERITONEAL CAVITY, PERCUTANEOUS APPROACH: ICD-10-PCS | Performed by: RADIOLOGY

## 2025-06-03 PROCEDURE — 88341 IMHCHEM/IMCYTCHM EA ADD ANTB: CPT | Performed by: STUDENT IN AN ORGANIZED HEALTH CARE EDUCATION/TRAINING PROGRAM

## 2025-06-03 PROCEDURE — NC001 PR NO CHARGE: Performed by: INTERNAL MEDICINE

## 2025-06-03 PROCEDURE — 74181 MRI ABDOMEN W/O CONTRAST: CPT

## 2025-06-03 PROCEDURE — 84300 ASSAY OF URINE SODIUM: CPT | Performed by: INTERNAL MEDICINE

## 2025-06-03 PROCEDURE — 82945 GLUCOSE OTHER FLUID: CPT | Performed by: INTERNAL MEDICINE

## 2025-06-03 RX ORDER — ALBUMIN (HUMAN) 12.5 G/50ML
25 SOLUTION INTRAVENOUS EVERY 8 HOURS
Status: COMPLETED | OUTPATIENT
Start: 2025-06-03 | End: 2025-06-04

## 2025-06-03 RX ORDER — LIDOCAINE WITH 8.4% SOD BICARB 0.9%(10ML)
SYRINGE (ML) INJECTION AS NEEDED
Status: COMPLETED | OUTPATIENT
Start: 2025-06-03 | End: 2025-06-03

## 2025-06-03 RX ADMIN — INSULIN ASPART 5 UNITS: 100 INJECTION, SUSPENSION SUBCUTANEOUS at 08:20

## 2025-06-03 RX ADMIN — ALBUMIN (HUMAN) 25 G: 0.25 INJECTION, SOLUTION INTRAVENOUS at 08:37

## 2025-06-03 RX ADMIN — CARVEDILOL 6.25 MG: 6.25 TABLET, FILM COATED ORAL at 08:23

## 2025-06-03 RX ADMIN — INSULIN LISPRO 1 UNITS: 100 INJECTION, SOLUTION INTRAVENOUS; SUBCUTANEOUS at 21:23

## 2025-06-03 RX ADMIN — INSULIN ASPART 5 UNITS: 100 INJECTION, SUSPENSION SUBCUTANEOUS at 16:35

## 2025-06-03 RX ADMIN — FOLIC ACID 1 MG: 1 TABLET ORAL at 08:23

## 2025-06-03 RX ADMIN — SODIUM BICARBONATE 650 MG TABLET 1300 MG: at 08:23

## 2025-06-03 RX ADMIN — SODIUM BICARBONATE 75 ML/HR: 84 INJECTION, SOLUTION INTRAVENOUS at 08:43

## 2025-06-03 RX ADMIN — CARVEDILOL 6.25 MG: 6.25 TABLET, FILM COATED ORAL at 16:33

## 2025-06-03 RX ADMIN — ALBUMIN (HUMAN) 25 G: 0.25 INJECTION, SOLUTION INTRAVENOUS at 16:33

## 2025-06-03 RX ADMIN — INSULIN LISPRO 2 UNITS: 100 INJECTION, SOLUTION INTRAVENOUS; SUBCUTANEOUS at 16:35

## 2025-06-03 RX ADMIN — LIDOCAINE 1 PATCH: 50 PATCH CUTANEOUS at 08:21

## 2025-06-03 RX ADMIN — AMLODIPINE BESYLATE 5 MG: 5 TABLET ORAL at 08:23

## 2025-06-03 RX ADMIN — Medication 100 MG: at 08:23

## 2025-06-03 RX ADMIN — DICLOFENAC SODIUM 2 G: 10 GEL TOPICAL at 08:19

## 2025-06-03 RX ADMIN — AMLODIPINE BESYLATE 5 MG: 5 TABLET ORAL at 16:33

## 2025-06-03 RX ADMIN — HEPARIN SODIUM 5000 UNITS: 5000 INJECTION INTRAVENOUS; SUBCUTANEOUS at 21:23

## 2025-06-03 RX ADMIN — HEPARIN SODIUM 5000 UNITS: 5000 INJECTION INTRAVENOUS; SUBCUTANEOUS at 14:05

## 2025-06-03 RX ADMIN — Medication 10 ML: at 10:12

## 2025-06-03 RX ADMIN — INSULIN LISPRO 1 UNITS: 100 INJECTION, SOLUTION INTRAVENOUS; SUBCUTANEOUS at 11:23

## 2025-06-03 RX ADMIN — HEPARIN SODIUM 5000 UNITS: 5000 INJECTION INTRAVENOUS; SUBCUTANEOUS at 04:49

## 2025-06-03 RX ADMIN — INSULIN LISPRO 1 UNITS: 100 INJECTION, SOLUTION INTRAVENOUS; SUBCUTANEOUS at 08:19

## 2025-06-03 RX ADMIN — HEPARIN SODIUM 5000 UNITS: 5000 INJECTION INTRAVENOUS; SUBCUTANEOUS at 04:42

## 2025-06-03 RX ADMIN — HYDRALAZINE HYDROCHLORIDE 25 MG: 25 TABLET ORAL at 14:05

## 2025-06-03 RX ADMIN — DICLOFENAC SODIUM 2 G: 10 GEL TOPICAL at 11:00

## 2025-06-03 NOTE — PROGRESS NOTES
Progress Note - Gastroenterology   Name: Wes Araujo 55 y.o. male I MRN: [id number]  Unit/Bed#: [place] [address] -01 I Date of Admission: 5/23/2025   Date of Service: 6/3/2025 I Hospital Day: 11    Assessment & Plan  Elevated LFTs  Alcoholic hepatitis with ascites  55-year-old male with a past medical history of hypertension, CKD, diabetes mellitus type 2, chronic pancreatitis secondary to chronic alcohol use who presented to [facility] on 5/23 with altered mental status likely due to elevated blood glucose.  Fortunately now mental status has improved.  However, patient's LFTs were also significantly elevated - AST/ALT 48/51, alkaline phosphatase 1069, total bilirubin 4.18.  INR also elevated to 5.53 but now improved status post vitamin K.  Patient has had LFT elevations in the past.  Admission RUQ US with no acute findings including grossly stable liver and biliary tree.  Prior MRI imaging completed for evaluation of LFT elevation in 2024 also unremarkable.  Complete serologic liver workup completed and was negative for any competing cause of liver injury.  Suspect that LFT elevations are likely due to longstanding history of alcohol use with a component of alcoholic hepatitis. Suspect that this is superimposed on some degree of fibrosis.  Given improvement in INR and LFTs, no indication for steroids as MDF is currently less than 32.  Ultimately recommend conservative management from GI perspective.  Will require close outpatient GI/hepatology follow-up to ensure LFTs have improved and also for consideration of liver biopsy versus alternative imaging to further risk stratify patient.  Also strongly recommend complete alcohol cessation moving forward.    Plan:  Given non-severe alc hep and no need for steroids, his lab abnormalities and acute portal HTN should resolve with time and continued alcohol cessation. He can have paracenteses done prn for comfort.   Diuretics on hold due to ZENAIDA.  C/w coreg  Daily MELD labs  Avoid  hepatotoxic medications, strongly encourage complete alcohol cessation  Consider liver biopsy outpatient given significant alkaline phosphatase elevation  Additional pain and symptom management per primary team  Will arrange for hep OV  Acute renal failure superimposed on stage 3 chronic kidney disease (HCC)  Lab Results   Component Value Date    EGFR 22 06/03/2025    EGFR 24 06/02/2025    EGFR 25 06/01/2025    CREATININE 2.94 (H) 06/03/2025    CREATININE 2.76 (H) 06/02/2025    CREATININE 2.68 (H) 06/01/2025     Cr continues to rise despite albumin. Nephro recs      Subjective   NAEO. He reports feeling fine and ha sno complaints besides urinating a lot.    Objective :  Temp:  [97.7 °F (36.5 °C)-98.4 °F (36.9 °C)] 98.4 °F (36.9 °C)  HR:  [70-76] 76  BP: (129-132)/(70-71) 131/71  Resp:  [18] 18  SpO2:  [98 %-99 %] 98 %    Physical Exam  Constitutional:       General: He is not in acute distress.     Appearance: He is normal weight.   HENT:      Head: Normocephalic.      Nose: Nose normal.     Eyes:      General: Scleral icterus present.       Cardiovascular:      Rate and Rhythm: Normal rate.      Pulses: Normal pulses.   Pulmonary:      Effort: Pulmonary effort is normal.   Abdominal:      General: Abdomen is flat. There is no distension.      Palpations: Abdomen is soft.      Tenderness: There is no abdominal tenderness.     Musculoskeletal:         General: Normal range of motion.      Cervical back: Normal range of motion.     Skin:     General: Skin is warm.      Coloration: Skin is jaundiced. Skin is not pale.     Neurological:      Mental Status: He is alert and oriented to person, place, and time.     Psychiatric:         Mood and Affect: Mood normal.         Behavior: Behavior normal.     Result Review: I have reviewed all relevant labs, imaging and procedure notes/images.

## 2025-06-03 NOTE — ASSESSMENT & PLAN NOTE
Lab Results   Component Value Date    HGBA1C 9.6 (H) 05/26/2025     Recent Labs     06/02/25  2048 06/03/25  0619 06/03/25  1115 06/03/25  1612   POCGLU 83 156* 183* 272*     HHNK upon arrival likely related to noncompliance with medications and alcohol use  Restarted on 70/30 at 5 units twice daily   Continue to monitor and titrate as needed

## 2025-06-03 NOTE — ASSESSMENT & PLAN NOTE
55-year-old male with a past medical history of hypertension, CKD, diabetes mellitus type 2, chronic pancreatitis secondary to chronic alcohol use who presented to [facility] on 5/23 with altered mental status likely due to elevated blood glucose.  Fortunately now mental status has improved.  However, patient's LFTs were also significantly elevated - AST/ALT 48/51, alkaline phosphatase 1069, total bilirubin 4.18.  INR also elevated to 5.53 but now improved status post vitamin K.  Patient has had LFT elevations in the past.  Admission RUQ US with no acute findings including grossly stable liver and biliary tree.  Prior MRI imaging completed for evaluation of LFT elevation in 2024 also unremarkable.  Complete serologic liver workup completed and was negative for any competing cause of liver injury.  Suspect that LFT elevations are likely due to longstanding history of alcohol use with a component of alcoholic hepatitis. Suspect that this is superimposed on some degree of fibrosis.  Given improvement in INR and LFTs, no indication for steroids as MDF is currently less than 32.  Ultimately recommend conservative management from GI perspective.  Will require close outpatient GI/hepatology follow-up to ensure LFTs have improved and also for consideration of liver biopsy versus alternative imaging to further risk stratify patient.  Also strongly recommend complete alcohol cessation moving forward.    Plan:  Given non-severe alc hep and no need for steroids, his lab abnormalities and acute portal HTN should resolve with time and continued alcohol cessation. He can have paracenteses done prn for comfort.   Diuretics on hold due to ZENAIDA.  C/w coreg  Daily MELD labs  Avoid hepatotoxic medications, strongly encourage complete alcohol cessation  Consider liver biopsy outpatient given significant alkaline phosphatase elevation  Additional pain and symptom management per primary team  Will arrange for hep OV

## 2025-06-03 NOTE — ASSESSMENT & PLAN NOTE
Lab Results   Component Value Date    EGFR 22 06/03/2025    EGFR 24 06/02/2025    EGFR 25 06/01/2025    CREATININE 2.94 (H) 06/03/2025    CREATININE 2.76 (H) 06/02/2025    CREATININE 2.68 (H) 06/01/2025     Cr continues to rise despite albumin. Nephro recs

## 2025-06-03 NOTE — ASSESSMENT & PLAN NOTE
ZENAIDA most likely secondary to prerenal azotemia secondary to osmotic diuresis plus some component of intravascular volume depletion in light of hypoalbuminemia with subsequent ATN versus HRS in the presence of acute liver injury/hyperbilirubinemia  After review of records it appears that the patient has a baseline Creatinine of 1.5-2.0 mg/dL.  Admission creatinine of 1.99 mg/dL on 5/23.  Status post albumin with minimal improvement.  Urine sodium 48, urine microscopy 10-20 RBCs, 4-10 white cells and 10-25 hyaline cast.

## 2025-06-03 NOTE — ASSESSMENT & PLAN NOTE
Patient presented with acute kidney injury with a creatinine of 1.99  History of CKD 3: baseline creatinine approximately 1.5-2.0  Appreciate nephrology evaluation and recommendations: Secondary to prerenal azotemia, diuresis, subsequent ATN versus HRS in the setting of acute liver failure  Creatinine has since worsened to 2.9  Status post IV fluids, IV albumin  Renal ultrasound: No hydronephrosis.  Increased echogenicity consistent with medical renal disease  Patient started on IV albumin and sodium bicarbonate infusion today by nephrology      Results from last 7 days   Lab Units 06/03/25  0448 06/02/25  0523 06/01/25  0525 05/31/25  0508 05/30/25  0533 05/29/25  0459 05/28/25  0508   BUN mg/dL 39* 37* 36* 30* 24 21 22   CREATININE mg/dL 2.94* 2.76* 2.68* 2.67* 2.55* 2.41* 2.53*   EGFR ml/min/1.73sq m 22 24 25 25 27 29 27

## 2025-06-03 NOTE — PROGRESS NOTES
NEPHROLOGY HOSPITAL PROGRESS NOTE   Wes Araujo 55 y.o. male MRN: [id number]  Unit/Bed#: [place] [id number] -01 Encounter: [id number]  Reason for Consult: ZENAIDA    Assessment & Plan  Acute kidney injury (HCC)  ZENAIDA most likely secondary to prerenal azotemia secondary to osmotic diuresis plus some component of intravascular volume depletion in light of hypoalbuminemia with subsequent ATN versus HRS in the presence of acute liver injury/hyperbilirubinemia  After review of records it appears that the patient has a baseline Creatinine of 1.5-2.0 mg/dL.  Admission creatinine of 1.99 mg/dL on 5/23.  Status post albumin with minimal improvement.  Urine sodium 48, urine microscopy 10-20 RBCs, 4-10 white cells and 10-25 hyaline cast.  CKD stage 3b, GFR 30-44 ml/min ([facility])  After review of records it appears that the patient has a baseline Creatinine of 1.5-2.0 mg/dL  Likely has underlying CKD secondary to poorly controlled diabetes plus hypertensive nephrosclerosis plus age-related nephron loss  Hyponatremia  Stable at 134  Primary hypertension    Home medications: Hydralazine 25 mg p.o. every 8, Norvasc 5 mg p.o. twice daily  Current medications: coreg 6.25 mg BID, Norvasc 5 mg p.o. twice daily, hydralazine 25 mg p.o. every 8h  Type 2 diabetes mellitus with hyperglycemia, with long-term current use of insulin ([facility])  Lab Results   Component Value Date    HGBA1C 9.6 (H) 05/26/2025   On insulin  Recommend tight glycemic control  Alcohol abuse  Monitor for withdrawal  Alcohol rehab on discharge  History of atrial fibrillation  Follow-up with cardiology  Abnormal LFTs  Management as per gastroenterology.  Consideration for possible liver biopsy.  Anticipated paracentesis  Other specified anemias  Transfuse as needed, s/p PRBC already this admission  Hyperkalemia  Stable currently   Metabolic acidosis  Refractory, start sodium bicarbonate infusion  Chronic bilateral low back pain with bilateral sciatica      Summary:  Unfortunately  renal function has continued to worsen  Suspect underlying component possibly due to hepatorenal syndrome  Challenge with albumin 25 g IV every 8 hours x 3 doses  Recommend sodium bicarbonate infusion.      SUBJECTIVE / 24H INTERVAL HISTORY:  Seen and examined.  Comfortable.  Family at bedside.  No acute distress noted.  Does not appear short of breath.    OBJECTIVE:  Current Weight: Weight - Scale: 61.4 kg (135 lb 5.8 oz)  Vitals:    06/03/25 0500 06/03/25 0536 06/03/25 0542 06/03/25 0734   BP:    131/71   Pulse:    76   Resp:    18   Temp:    98.4 °F (36.9 °C)   TempSrc:       SpO2:    98%   Weight: 61.4 kg (135 lb 5.8 oz) 61.4 kg (135 lb 5.8 oz) 61.4 kg (135 lb 5.8 oz)    Height:           Intake/Output Summary (Last 24 hours) at 6/3/2025 1020  Last data filed at 6/3/2025 0532  Gross per 24 hour   Intake 120 ml   Output 425 ml   Net -305 ml       Physical Exam  Constitutional:       Appearance: He is not ill-appearing.     Cardiovascular:      Rate and Rhythm: Normal rate and regular rhythm.   Pulmonary:      Effort: Pulmonary effort is normal.      Breath sounds: Normal breath sounds.   Abdominal:      General: There is distension.      Palpations: Abdomen is soft.      Tenderness: There is no abdominal tenderness.     Musculoskeletal:      Right lower leg: No edema.      Left lower leg: No edema.     Skin:     General: Skin is warm and dry.      Coloration: Skin is jaundiced.      Findings: No rash.     Neurological:      Mental Status: He is alert and oriented to person, place, and time.         Medications:  Current Medications[1]    Laboratory Results:  Results from last 7 days   Lab Units 06/03/25  0448 06/02/25  0523 06/01/25  0525 05/31/25  0508 05/30/25  0533 05/29/25  0459 05/28/25  0508   WBC Thousand/uL  --  9.14 9.92 11.51* 9.68 9.70 9.29   HEMOGLOBIN g/dL  --  8.3* 8.2* 8.9* 6.7* 7.2* 6.5*   HEMATOCRIT %  --  24.6* 23.7* 25.8* 19.7* 20.9* 19.5*   PLATELETS Thousands/uL  --  182 195 220 208 219 243  "  POTASSIUM mmol/L 5.0 4.9 5.0 4.8 5.3 5.6* 5.1   CHLORIDE mmol/L 110* 109* 108 108 108 109* 109*   CO2 mmol/L 15* 16* 17* 20* 16* 21 21   BUN mg/dL 39* 37* 36* 30* 24 21 22   CREATININE mg/dL 2.94* 2.76* 2.68* 2.67* 2.55* 2.41* 2.53*   CALCIUM mg/dL 9.0 8.9 8.8 9.1 8.5 9.0 8.5   MAGNESIUM mg/dL  --   --  1.9  --   --   --  2.3   PHOSPHORUS mg/dL  --   --  3.3 3.0  --  2.4* 2.8       Portions of the record may have been created with voice recognition software. Occasional wrong word or \"sound a like\" substitutions may have occurred due to the inherent limitations of voice recognition software. Read the chart carefully and recognize, using context, where substitutions have occurred.If you have any questions, please contact the dictating provider.       [1]   Current Facility-Administered Medications:     acetaminophen (TYLENOL) tablet 325 mg, 325 mg, Oral, Q6H PRN, ARIANE Moore, 325 mg at 06/01/25 1029    albumin human (FLEXBUMIN) 25 % injection 25 g, 25 g, Intravenous, Q8H, Vishnu Messer, DO, 25 g at 06/03/25 0837    amLODIPine (NORVASC) tablet 5 mg, 5 mg, Oral, BID, Bibi Solis MD, 5 mg at 06/03/25 0823    carvedilol (COREG) tablet 6.25 mg, 6.25 mg, Oral, BID With Meals, Sonja Siegel PA-C, 6.25 mg at 06/03/25 0823    Diclofenac Sodium (VOLTAREN) 1 % topical gel 2 g, 2 g, Topical, 4x Daily, ARIANE Moore, 2 g at 06/03/25 0819    folic acid (FOLVITE) tablet 1 mg, 1 mg, Oral, Daily, Bala Leroy DO, 1 mg at 06/03/25 0823    heparin (porcine) subcutaneous injection 5,000 Units, 5,000 Units, Subcutaneous, Q8H [facility], Mojgan Young MD, 5,000 Units at 06/03/25 0449    hydrALAZINE (APRESOLINE) injection 10 mg, 10 mg, Intravenous, Q4H PRN, ARIANE Leggett, 10 mg at 05/25/25 0318    hydrALAZINE (APRESOLINE) tablet 25 mg, 25 mg, Oral, Q8H JAISON, Sonja Siegel PA-C, 25 mg at 06/02/25 1342    insulin aspart protamine-insulin aspart (NovoLOG 70/30) 100 units/mL subcutaneous injection 5 Units, 5 " Units, Subcutaneous, BID AC, ARIANE Leggett, 5 Units at 06/03/25 0820    insulin lispro (HumALOG/ADMELOG) 100 units/mL subcutaneous injection 1-5 Units, 1-5 Units, Subcutaneous, TID AC, 1 Units at 06/03/25 0819 **AND** Fingerstick Glucose (POCT), , , TID AC, ARIANE Leggett    insulin lispro (HumALOG/ADMELOG) 100 units/mL subcutaneous injection 1-5 Units, 1-5 Units, Subcutaneous, HS, ARIANE Leggett, 1 Units at 05/29/25 2233    lidocaine (LIDODERM) 5 % patch 1 patch, 1 patch, Topical, Daily, ARIANE Moore, 1 patch at 06/03/25 0821    lidocaine 1% buffered, , Infiltration, PRN, Marcia Dubois PA-C, 10 mL at 06/03/25 1012    ondansetron (ZOFRAN) injection 4 mg, 4 mg, Intravenous, Q4H PRN, Bala Leroy DO    sodium bicarbonate 150 mEq in dextrose 5 % 1,000 mL infusion, 75 mL/hr, Intravenous, Continuous, Vishnu Messer DO, Last Rate: 75 mL/hr at 06/03/25 0843, 75 mL/hr at 06/03/25 0843    thiamine tablet 100 mg, 100 mg, Oral, Daily, Bala Leroy DO, 100 mg at 06/03/25 0823

## 2025-06-03 NOTE — ASSESSMENT & PLAN NOTE
Management as per gastroenterology.  Consideration for possible liver biopsy.  Anticipated paracentesis

## 2025-06-03 NOTE — PLAN OF CARE
Problem: PAIN - ADULT  Goal: Verbalizes/displays adequate comfort level or baseline comfort level  Description: Interventions:  - Encourage patient to monitor pain and request assistance  - Assess pain using appropriate pain scale  - Administer analgesics as ordered based on type and severity of pain and evaluate response  - Implement non-pharmacological measures as appropriate and evaluate response  - Consider cultural and social influences on pain and pain management  - Notify physician/advanced practitioner if interventions unsuccessful or patient reports new pain  - Educate patient/family on pain management process including their role and importance of  reporting pain   - Provide non-pharmacologic/complimentary pain relief interventions  Outcome: Progressing     Problem: INFECTION - ADULT  Goal: Absence or prevention of progression during hospitalization  Description: INTERVENTIONS:  - Assess and monitor for signs and symptoms of infection  - Monitor lab/diagnostic results  - Monitor all insertion sites, i.e. indwelling lines, tubes, and drains  - Monitor endotracheal if appropriate and nasal secretions for changes in amount and color  - [place] appropriate cooling/warming therapies per order  - Administer medications as ordered  - Instruct and encourage patient and family to use good hand hygiene technique  - Identify and instruct in appropriate isolation precautions for identified infection/condition  Outcome: Progressing  Goal: Absence of fever/infection during neutropenic period  Description: INTERVENTIONS:  - Monitor WBC  - Perform strict hand hygiene  - Limit to healthy visitors only  - No plants, dried, fresh or silk flowers with mcclure in patient room  Outcome: Progressing     Problem: SAFETY ADULT  Goal: Patient will remain free of falls  Description: INTERVENTIONS:  - Educate patient/family on patient safety including physical limitations  - Instruct patient to call for assistance with activity   -  Consider consulting OT/PT to assist with strengthening/mobility based on AM PAC & JH-HLM score  - Consult OT/PT to assist with strengthening/mobility   - Keep Call bell within reach  - Keep bed low and locked with side rails adjusted as appropriate  - Keep care items and personal belongings within reach  - Initiate and maintain comfort rounds  - Make Fall Risk Sign visible to staff  - Offer Toileting every 2 Hours, in advance of need  - Initiate/Maintain bed alarm  - Obtain necessary fall risk management equipment: alarms  - Apply yellow socks and bracelet for high fall risk patients  - Consider moving patient to room near nurses station  Outcome: Progressing  Goal: Maintain or return to baseline ADL function  Description: INTERVENTIONS:  -  Assess patient's ability to carry out ADLs; assess patient's baseline for ADL function and identify physical deficits which impact ability to perform ADLs (bathing, care of mouth/teeth, toileting, grooming, dressing, etc.)  - Assess/evaluate cause of self-care deficits   - Assess range of motion  - Assess patient's mobility; develop plan if impaired  - Assess patient's need for assistive devices and provide as appropriate  - Encourage maximum independence but intervene and supervise when necessary  - Involve family in performance of ADLs  - Assess for home care needs following discharge   - Consider OT consult to assist with ADL evaluation and planning for discharge  - Provide patient education as appropriate  - Monitor functional capacity and physical performance, use of AM PAC & JH-HLM   - Monitor gait, balance and fatigue with ambulation    Outcome: Progressing  Goal: Maintains/Returns to pre admission functional level  Description: INTERVENTIONS:  - Perform AM-PAC 6 Click Basic Mobility/ Daily Activity assessment daily.  - Set and communicate daily mobility goal to care team and patient/family/caregiver.   - Collaborate with rehabilitation services on mobility goals if  consulted  - Perform Range of Motion 4 times a day.  - Reposition patient every 2 hours.  - Dangle patient 3 times a day  - Stand patient 3 times a day  - Ambulate patient 3 times a day  - Out of bed to chair 3 times a day   - Out of bed for meals 3 times a day  - Out of bed for toileting  - Record patient progress and toleration of activity level   Outcome: Progressing     Problem: DISCHARGE PLANNING  Goal: Discharge to home or other facility with appropriate resources  Description: INTERVENTIONS:  - Identify barriers to discharge w/patient and caregiver  - Arrange for needed discharge resources and transportation as appropriate  - Identify discharge learning needs (meds, wound care, etc.)  - Arrange for interpretive services to assist at discharge as needed  - Refer to Case Management Department for coordinating discharge planning if the patient needs post-hospital services based on physician/advanced practitioner order or complex needs related to functional status, cognitive ability, or social support system  Outcome: Progressing     Problem: Knowledge Deficit  Goal: Patient/family/caregiver demonstrates understanding of disease process, treatment plan, medications, and discharge instructions  Description: Complete learning assessment and assess knowledge base.  Interventions:  - Provide teaching at level of understanding  - Provide teaching via preferred learning methods  Outcome: Progressing     Problem: GASTROINTESTINAL - ADULT  Goal: Minimal or absence of nausea and/or vomiting  Description: INTERVENTIONS:  - Administer IV fluids if ordered to ensure adequate hydration  - Maintain NPO status until nausea and vomiting are resolved  - Nasogastric tube if ordered  - Administer ordered antiemetic medications as needed  - Provide nonpharmacologic comfort measures as appropriate  - Advance diet as tolerated, if ordered  - Consider nutrition services referral to assist patient with adequate nutrition and appropriate  food choices  Outcome: Progressing  Goal: Maintains adequate nutritional intake  Description: INTERVENTIONS:  - Monitor percentage of each meal consumed  - Identify factors contributing to decreased intake, treat as appropriate  - Assist with meals as needed  - Monitor I&O, weight, and lab values if indicated  - Obtain nutrition services referral as needed  Outcome: Progressing     Problem: METABOLIC, FLUID AND ELECTROLYTES - ADULT  Goal: Electrolytes maintained within normal limits  Description: INTERVENTIONS:  - Monitor labs and assess patient for signs and symptoms of electrolyte imbalances  - Administer electrolyte replacement as ordered  - Monitor response to electrolyte replacements, including repeat lab results as appropriate  - Instruct patient on fluid and nutrition as appropriate  Outcome: Progressing  Goal: Fluid balance maintained  Description: INTERVENTIONS:  - Monitor labs   - Monitor I/O and WT  - Instruct patient on fluid and nutrition as appropriate  - Assess for signs & symptoms of volume excess or deficit  Outcome: Progressing  Goal: Glucose maintained within target range  Description: INTERVENTIONS:  - Monitor Blood Glucose as ordered  - Assess for signs and symptoms of hyperglycemia and hypoglycemia  - Administer ordered medications to maintain glucose within target range  - Assess nutritional intake and initiate nutrition service referral as needed  Outcome: Progressing     Problem: SKIN/TISSUE INTEGRITY - ADULT  Goal: Skin Integrity remains intact(Skin Breakdown Prevention)  Description: Assess:  -Perform Jose assessment every shift  -Clean and moisturize skin every shift  -Inspect skin when repositioning, toileting, and assisting with ADLS  -Assess under medical devices such as IV every shift  -Assess extremities for adequate circulation and sensation     Bed Management:  -Have minimal linens on bed & keep smooth, unwrinkled  -Change linens as needed when moist or perspiring  -Avoid sitting  or lying in one position for more than 2 hours while in bed  -Keep HOB at 30 degrees     Toileting:  -Offer bedside commode  -Assess for incontinence every shift  -Use incontinent care products after each incontinent episode such as foam cleanser    Activity:  -Mobilize patient 3 times a day  -Encourage activity and walks on unit  -Encourage or provide ROM exercises   -Turn and reposition patient every 2 Hours  -Use appropriate equipment to lift or move patient in bed  -Instruct/ Assist with weight shifting every hour when out of bed in chair  -Consider limitation of chair time 1 hour intervals    Skin Care:  -Avoid use of baby powder, tape, friction and shearing, hot water or constrictive clothing  -Relieve pressure over bony prominences using pillows  -Do not massage red bony areas    Next Steps:  -Teach patient strategies to minimize risks such as skin breakdown   -Consider consults to  interdisciplinary teams such as wound care  Outcome: Progressing     Problem: HEMATOLOGIC - ADULT  Goal: Maintains hematologic stability  Description: INTERVENTIONS  - Assess for signs and symptoms of bleeding or hemorrhage  - Monitor labs  - Administer supportive blood products/factors as ordered and appropriate  Outcome: Progressing     Problem: MUSCULOSKELETAL - ADULT  Goal: Maintain or return mobility to safest level of function  Description: INTERVENTIONS:  - Assess patient's ability to carry out ADLs; assess patient's baseline for ADL function and identify physical deficits which impact ability to perform ADLs (bathing, care of mouth/teeth, toileting, grooming, dressing, etc.)  - Assess/evaluate cause of self-care deficits   - Assess range of motion  - Assess patient's mobility  - Assess patient's need for assistive devices and provide as appropriate  - Encourage maximum independence but intervene and supervise when necessary  - Involve family in performance of ADLs  - Assess for home care needs following discharge   -  Consider OT consult to assist with ADL evaluation and planning for discharge  - Provide patient education as appropriate  Outcome: Progressing  Goal: Maintain proper alignment of affected body part  Description: INTERVENTIONS:  - Support, maintain and protect limb and body alignment  - Provide patient/ family with appropriate education  Outcome: Progressing     Problem: Prexisting or High Potential for Compromised Skin Integrity  Goal: Skin integrity is maintained or improved  Description: INTERVENTIONS:  - Identify patients at risk for skin breakdown  - Assess and monitor skin integrity including under and around medical devices   - Assess and monitor nutrition and hydration status  - Monitor labs  - Assess for incontinence   - Turn and reposition patient  - Assist with mobility/ambulation  - Relieve pressure over keith prominences   - Avoid friction and shearing  - Provide appropriate hygiene as needed including keeping skin clean and dry  - Evaluate need for skin moisturizer/barrier cream  - Collaborate with interdisciplinary team  - Patient/family teaching  - Consider wound care consult    Assess:  - Review Jose scale daily  - Clean and moisturize skin every shift  - Inspect skin when repositioning, toileting, and assisting with ADLS  - Assess under medical devices such as IV every shift  - Assess extremities for adequate circulation and sensation     Bed Management:  - Have minimal linens on bed & keep smooth, unwrinkled  - Change linens as needed when moist or perspiring  - Avoid sitting or lying in one position for more than 2 hours while in bed?Keep HOB at 30 degrees   - Toileting:  - Offer bedside commode  - Assess for incontinence every shift  - Use incontinent care products after each incontinent episode such as foam cleanser    Activity:  - Mobilize patient 3 times a day  - Encourage activity and walks on unit  - Encourage or provide ROM exercises   - Turn and reposition patient every 2 Hours  - Use  appropriate equipment to lift or move patient in bed  - Instruct/ Assist with weight shifting every hour when out of bed in chair  - Consider limitation of chair time 12 hour intervals    Skin Care:  - Avoid use of baby powder, tape, friction and shearing, hot water or constrictive clothing  - Relieve pressure over bony prominences using pillows  - Do not massage red bony areas    Next Steps:  - Teach patient strategies to minimize risks such as skin breakdown  - Consider consults to  interdisciplinary teams such as wound care  Outcome: Progressing     Problem: MOBILITY - ADULT  Goal: Maintain or return to baseline ADL function  Description: INTERVENTIONS:  -  Assess patient's ability to carry out ADLs; assess patient's baseline for ADL function and identify physical deficits which impact ability to perform ADLs (bathing, care of mouth/teeth, toileting, grooming, dressing, etc.)  - Assess/evaluate cause of self-care deficits   - Assess range of motion  - Assess patient's mobility; develop plan if impaired  - Assess patient's need for assistive devices and provide as appropriate  - Encourage maximum independence but intervene and supervise when necessary  - Involve family in performance of ADLs  - Assess for home care needs following discharge   - Consider OT consult to assist with ADL evaluation and planning for discharge  - Provide patient education as appropriate  - Monitor functional capacity and physical performance, use of AM PAC & JH-HLM   - Monitor gait, balance and fatigue with ambulation    Outcome: Progressing  Goal: Maintains/Returns to pre admission functional level  Description: INTERVENTIONS:  - Perform AM-PAC 6 Click Basic Mobility/ Daily Activity assessment daily.  - Set and communicate daily mobility goal to care team and patient/family/caregiver.   - Collaborate with rehabilitation services on mobility goals if consulted  - Perform Range of Motion 4 times a day.  - Reposition patient every 2  hours.  - Dangle patient 3 times a day  - Stand patient 3 times a day  - Ambulate patient 3 times a day  - Out of bed to chair 3 times a day   - Out of bed for meals 3 times a day  - Out of bed for toileting  - Record patient progress and toleration of activity level   Outcome: Progressing

## 2025-06-03 NOTE — PROGRESS NOTES
Progress Note - Hospitalist   Name: Wes Araujo 55 y.o. male I MRN: [id number]  Unit/Bed#: [place] [address] -01 I Date of Admission: 5/23/2025   Date of Service: 6/3/2025 I Hospital Day: 11    Assessment & Plan  Toxic metabolic encephalopathy  History of atrial fibrillation hypertension alcohol liver disease who presented to the hospital for change in mental status found to have hyperglycemia    Admitted to ICU and stabilized  Altered mental status attributed to toxic metabolic encephalopathy secondary to hyperglycemia with HHNK, acute kidney injury, and acute liver failure  Clinically improved back to baseline  Acute renal failure superimposed on stage 3 chronic kidney disease (HCC)  Patient presented with acute kidney injury with a creatinine of 1.99  History of CKD 3: baseline creatinine approximately 1.5-2.0  Appreciate nephrology evaluation and recommendations: Secondary to prerenal azotemia, diuresis, subsequent ATN versus HRS in the setting of acute liver failure  Creatinine has since worsened to 2.9  Status post IV fluids, IV albumin  Renal ultrasound: No hydronephrosis.  Increased echogenicity consistent with medical renal disease  Patient started on IV albumin and sodium bicarbonate infusion today by nephrology      Results from last 7 days   Lab Units 06/03/25  0448 06/02/25  0523 06/01/25  0525 05/31/25  0508 05/30/25  0533 05/29/25  0459 05/28/25  0508   BUN mg/dL 39* 37* 36* 30* 24 21 22   CREATININE mg/dL 2.94* 2.76* 2.68* 2.67* 2.55* 2.41* 2.53*   EGFR ml/min/1.73sq m 22 24 25 25 27 29 27     Abnormal LFTs  Evidence of worsening hyperbilirubinemia, elevated alkaline phosphatase and coagulopathy on admission  No significant transaminitis: Initially mild elevated AST  evaluated by gastroenterology.    Noted to have chronically elevated liver enzymes: Acute rise this admission was attributed to acute alcoholic hepatitis  Discriminant function did not meet criteria for steroids  Per GI: No stigmata of  chronic liver disease on exam  Outpatient elastography versus liver biopsy for definitive diagnosis  Serologic workup negative for etiology of liver injury  Initial coagulopathy resolved with vitamin K  MRI liver: Pending, ordered by GI  Ruled out infection: Cultures no growth  Renal ultrasound: Moderate ascites  IR paracentesis 6/3: 1.7 L clear fluid  SAAG =2.5 c/w portal HTN    Results from last 7 days   Lab Units 06/03/25  0448 06/02/25  0523 06/01/25  0525 05/31/25  0508 05/30/25  0533 05/29/25  0459 05/28/25  0508   AST U/L 49* 45* 37 35 35 33 43*   ALT U/L 31 28 24 23 19 22 27   TOTAL BILIRUBIN mg/dL 8.47* 8.63* 9.11* 10.99* 7.84* 7.91* 7.26*     Results from last 7 days   Lab Units 06/02/25  0523 05/30/25  0533 05/28/25  0508   INR  1.09 1.07 1.06     Other specified anemias  No evidence of blood loss.  Transfused 1 unit PRBC 5/28, and again 5/30  EGD and colonoscopy February with dieulafoy lesion but no other sources of bleeding  Evaluated by GI: no current endoscopy recommended  Monitor hemoglobin    Results from last 7 days   Lab Units 06/02/25  0523 06/01/25  0525 05/31/25  0508 05/30/25  0533 05/29/25  0459 05/28/25  0508   HEMOGLOBIN g/dL 8.3* 8.2* 8.9* 6.7* 7.2* 6.5*     Primary hypertension  Initially presented with markedly elevated blood pressure secondary to noncompliance  Continue amlodipine 5 mg twice daily, hydralazine 25 mg 3 times daily and new Coreg 6.25 twice daily  Blood pressure adequately controlled  Type 2 diabetes mellitus with hyperglycemia, with long-term current use of insulin (HCC)  Lab Results   Component Value Date    HGBA1C 9.6 (H) 05/26/2025     Recent Labs     06/02/25 2048 06/03/25  0619 06/03/25  1115 06/03/25  1612   POCGLU 83 156* 183* 272*     HHNK upon arrival likely related to noncompliance with medications and alcohol use  Restarted on 70/30 at 5 units twice daily   Continue to monitor and titrate as needed  Alcoholic hepatitis with ascites  History of alcohol use with  withdrawal and withdrawal seizures  Father reported active consumption of alcohol  Initially noted to have altered mental status, treated in the ICU with phenobarbital for possible alcohol withdrawal  Continue thiamine and folic acid  Case management following for rehab referrals  History of atrial fibrillation  History of atrial fibrillation on metoprolol  Not on anticoagulation at baseline  Hyponatremia  Patient with hyponatremia  Reviewed previous records: Appears to have chronic, intermittent hyponatremia, suspect secondary to free water excess in the setting of alcohol abuse  Appreciate nephrology evaluation and recommendations  Chronic pancreatitis (HCC)  Patient with a history of chronic pancreatitis secondary to alcohol  Initially presented with abdominal pain nausea/vomiting, since improved  Continue diet, and complete alcohol cessation  Continue proton pump inhibitor  Chronic bilateral low back pain with bilateral sciatica  Patient complains of low back pain across his bilateral lower back, radiating down both legs to his toes.  Denies any numbness.  States it has been present for many months  Patient had previous workups with x-rays of lumbar spine  X-ray lumbar spine: No acute abnormality  Continue ambulation  Continue Tylenol/Lidoderm/Voltaren/heating    VTE Pharmacologic Prophylaxis: VTE Score: 3 Moderate Risk (Score 3-4) - Pharmacological DVT Prophylaxis Ordered: heparin.    Mobility:   Basic Mobility Inpatient Raw Score: 23  JH-HLM Goal: 7: Walk 25 feet or more  JH-HLM Achieved: 7: Walk 25 feet or more  JH-HLM Goal achieved. Continue to encourage appropriate mobility.    Patient Centered Rounds: I performed bedside rounds with nursing staff today.   Discussions with Specialists or Other Care Team Provider: d/w GI: Dr. Renee    Education and Discussions with Family / Patient: called pts father Wes Gutiérrez Sr at [facility] to call me for update.  Called home number and gave update to father in  [place]    Current Length of Stay: 11 day(s)  Current Patient Status: Inpatient   Certification Statement: The patient will continue to require additional inpatient hospital stay due to hyperbilirubinemia.  Worsening ZENAIDA  Discharge Plan: Anticipate discharge in 48-72 hrs to alcohol rehab    Code Status: Level 1 - Full Code    Subjective   Patient was examined and interviewed by me in [place] at the bedside.  He denies any pain anywhere.  Specifically denies any abdominal pain post paracentesis.  He denies any nausea, vomiting, diarrhea or constipation.  Notes he is tolerating p.o.  Denies any difficulty breathing.  Denies any cough.  Denies any other complaints    Objective :  Temp:  [97.7 °F (36.5 °C)-98.4 °F (36.9 °C)] 98.2 °F (36.8 °C)  HR:  [73-76] 73  BP: (129-147)/(64-74) 147/74  Resp:  [12-18] 16  SpO2:  [98 %] 98 %    Body mass index is 19.99 kg/m².     Input and Output Summary (last 24 hours):     Intake/Output Summary (Last 24 hours) at 6/3/2025 1625  Last data filed at 6/3/2025 1109  Gross per 24 hour   Intake 360 ml   Output 1995 ml   Net -1635 ml       Physical Exam  General: Very pleasant male.  No acute distress.  Nontachypneic and nondyspneic  Heart: Regular rate and rhythm.  S1-S2 present.  No murmur, rub, gallop  Lungs: Clear to auscultation bilaterally.  Good air movement.  No wheezes, crackles, rhonchi.  No accessory muscle use or respiratory distress  Abdomen: Soft, nontender with palpation.  Nondistended.  Normal active bowel sounds present.  No guarding or rebound.  No peritoneal signs or mass  Extremities: No clubbing, cyanosis, edema.  2+ pedal pulses bilaterally  Neurologic: Awake.  Alert.  Oriented.  Interactive.    Lines/Drains:              Lab Results: I have reviewed the following results:   Results from last 7 days   Lab Units 06/02/25  0523   WBC Thousand/uL 9.14   HEMOGLOBIN g/dL 8.3*   HEMATOCRIT % 24.6*   PLATELETS Thousands/uL 182   SEGS PCT % 61   LYMPHO PCT % 19   MONO PCT % 12    EOS PCT % 5     Results from last 7 days   Lab Units 06/03/25  0448   SODIUM mmol/L 134*   POTASSIUM mmol/L 5.0   CHLORIDE mmol/L 110*   CO2 mmol/L 15*   BUN mg/dL 39*   CREATININE mg/dL 2.94*   ANION GAP mmol/L 9   CALCIUM mg/dL 9.0   ALBUMIN g/dL 3.5   TOTAL BILIRUBIN mg/dL 8.47*   ALK PHOS U/L 409*   ALT U/L 31   AST U/L 49*   GLUCOSE RANDOM mg/dL 101     Results from last 7 days   Lab Units 06/02/25  0523   INR  1.09     Results from last 7 days   Lab Units 06/03/25  1612 06/03/25  1115 06/03/25  0619 06/02/25  2048 06/02/25  2022 06/02/25  1638 06/02/25  1111 06/02/25  0740 06/01/25  2100 06/01/25  1605 06/01/25  1143 06/01/25  0805   POC GLUCOSE mg/dl 272* 183* 156* 83 77 192* 192* 195* 143* 180* 178* 201*               Recent Cultures (last 7 days):       ===================================================  Imaging     6/1 US kidney/bladder  No hydronephrosis.  Increased echogenicity of the renal parenchyma bilaterally suggestive of medical renal disease.  At least moderate abdominal and pelvic ascites.     6/1 Xray Lumbar spine  No acute osseous abnormality      5/26 chest x-rayLeft lower lobe atelectasis versus infiltrate      5/23 right upper quadrant ultrasound  Heterogeneous appearance of the pancreas, correlates with fatty replacement and calcifications seen on prior CT, possibly sequela of chronic inflammation/chronic pancreatitis.  Liver appears grossly unremarkable.  Small volume hypoechoic ascites, and other findings as above.     5/23 CT head without contrast  No acute intracranial abnormality.      Microbiology   6/3: Body fluid culture: Pending  5/26 blood culture: Negative x 2      Procedure  6/3: IR paracentesis: 1700 cc fluid           ==================================================    Last 24 Hours Medication List:     Current Facility-Administered Medications:     acetaminophen (TYLENOL) tablet 325 mg, Q6H PRN    albumin human (FLEXBUMIN) 25 % injection 25 g, Q8H    amLODIPine (NORVASC)  tablet 5 mg, BID    carvedilol (COREG) tablet 6.25 mg, BID With Meals    Diclofenac Sodium (VOLTAREN) 1 % topical gel 2 g, 4x Daily    folic acid (FOLVITE) tablet 1 mg, Daily    heparin (porcine) subcutaneous injection 5,000 Units, Q8H JAISON    hydrALAZINE (APRESOLINE) injection 10 mg, Q4H PRN    hydrALAZINE (APRESOLINE) tablet 25 mg, Q8H JAISON    insulin aspart protamine-insulin aspart (NovoLOG 70/30) 100 units/mL subcutaneous injection 5 Units, BID AC    insulin lispro (HumALOG/ADMELOG) 100 units/mL subcutaneous injection 1-5 Units, TID AC **AND** Fingerstick Glucose (POCT), TID AC    insulin lispro (HumALOG/ADMELOG) 100 units/mL subcutaneous injection 1-5 Units, HS    lidocaine (LIDODERM) 5 % patch 1 patch, Daily    ondansetron (ZOFRAN) injection 4 mg, Q4H PRN    sodium bicarbonate 150 mEq in dextrose 5 % 1,000 mL infusion, Continuous, Last Rate: 75 mL/hr (06/03/25 0843)    thiamine tablet 100 mg, Daily    Administrative Statements   Today, Patient Was Seen By: Mojgan Young MD      **Please Note: This note may have been constructed using a voice recognition system.**

## 2025-06-03 NOTE — ASSESSMENT & PLAN NOTE
55-year-old male with a past medical history of hypertension, CKD, diabetes mellitus type 2, chronic pancreatitis secondary to chronic alcohol use who presented to [facility] on 5/23 with altered mental status likely due to elevated blood glucose.  Fortunately now mental status has improved.  However, patient's LFTs were also significantly elevated - AST/ALT 48/51, alkaline phosphatase 1069, total bilirubin 4.18.  INR also elevated to 5.53 but now improved status post vitamin K.  Patient has had LFT elevations in the past.  Admission RUQ US with no acute findings including grossly stable liver and biliary tree.  Prior MRI imaging completed for evaluation of LFT elevation in 2024 also unremarkable.  Complete serologic liver workup completed and was negative for any competing cause of liver injury.  Suspect that LFT elevations are likely due to longstanding history of alcohol use with a component of alcoholic hepatitis. Suspect that this is superimposed on some degree of fibrosis.  Given improvement in INR and LFTs, no indication for steroids as MDF is currently less than 32.  Ultimately recommend conservative management from GI perspective.  Will require close outpatient GI/hepatology follow-up to ensure LFTs have improved and also for consideration of liver biopsy versus alternative imaging to further risk stratify patient.  Also strongly recommend complete alcohol cessation moving forward.    Plan:  Given non-severe alc hep and no need for steroids, his lab abnormalities and acute portal HTN should resolve with time and continued alcohol cessation. He can have paracenteses done prn for comfort.   Diuretics on hold due to ZENAIDA.  C/w coreg  Daily MELD labs  Avoid hepatotoxic medications, strongly encourage complete alcohol cessation  Consider liver biopsy outpatient given significant alkaline phosphatase elevation  Additional pain and symptom management per primary team  Will arrange for hep OV

## 2025-06-03 NOTE — DISCHARGE INSTRUCTIONS
Abdominal Paracentesis     WHAT YOU NEED TO KNOW:   Abdominal paracentesis is a procedure to remove abnormal fluid buildup in your abdomen. Fluid builds up because of liver problems, such as swelling and scarring. Heart failure, kidney disease, a mass, or problems with your pancreas may also cause fluid buildup.     DISCHARGE INSTRUCTIONS:     Follow up with your healthcare provider as directed: Write down your questions so you remember to ask them during your visits.     Wound care: Remove dressing after 24 hours. Leave glue in place.    Return to your normal activities    Contact Interventional Radiology at [phone] ([facility] PATIENTS: Contact Interventional Radiology at [phone]) ([facility] PATIENTS: Contact Interventional Radiology at [phone]) if:  You have a fever and your wound is red and swollen.   You have yellow, green, or bad-smelling discharge coming from your wound.   You have pain or swelling in your abdomen.   You have an upset stomach or you vomit.   You have sudden, sharp pain in your abdomen.   You urinate very little or not at all.   You feel confused and more tired than usual.   Your arm or leg feels warm, tender, and painful. It may look swollen and red.   You suddenly feel lightheaded and have trouble breathing.

## 2025-06-03 NOTE — PLAN OF CARE
Problem: PAIN - ADULT  Goal: Verbalizes/displays adequate comfort level or baseline comfort level  Description: Interventions:  - Encourage patient to monitor pain and request assistance  - Assess pain using appropriate pain scale  - Administer analgesics as ordered based on type and severity of pain and evaluate response  - Implement non-pharmacological measures as appropriate and evaluate response  - Consider cultural and social influences on pain and pain management  - Notify physician/advanced practitioner if interventions unsuccessful or patient reports new pain  - Educate patient/family on pain management process including their role and importance of  reporting pain   - Provide non-pharmacologic/complimentary pain relief interventions  Outcome: Progressing     Problem: INFECTION - ADULT  Goal: Absence or prevention of progression during hospitalization  Description: INTERVENTIONS:  - Assess and monitor for signs and symptoms of infection  - Monitor lab/diagnostic results  - Monitor all insertion sites, i.e. indwelling lines, tubes, and drains  - Monitor endotracheal if appropriate and nasal secretions for changes in amount and color  - [place] appropriate cooling/warming therapies per order  - Administer medications as ordered  - Instruct and encourage patient and family to use good hand hygiene technique  - Identify and instruct in appropriate isolation precautions for identified infection/condition  Outcome: Progressing  Goal: Absence of fever/infection during neutropenic period  Description: INTERVENTIONS:  - Monitor WBC  - Perform strict hand hygiene  - Limit to healthy visitors only  - No plants, dried, fresh or silk flowers with mcclure in patient room  Outcome: Progressing     Problem: SAFETY ADULT  Goal: Patient will remain free of falls  Description: INTERVENTIONS:  - Educate patient/family on patient safety including physical limitations  - Instruct patient to call for assistance with activity   -  Consider consulting OT/PT to assist with strengthening/mobility based on AM PAC & JH-HLM score  - Consult OT/PT to assist with strengthening/mobility   - Keep Call bell within reach  - Keep bed low and locked with side rails adjusted as appropriate  - Keep care items and personal belongings within reach  - Initiate and maintain comfort rounds  - Make Fall Risk Sign visible to staff  - Offer Toileting every 2 Hours, in advance of need  - Initiate/Maintain bed alarm  - Obtain necessary fall risk management equipment: alarms  - Apply yellow socks and bracelet for high fall risk patients  - Consider moving patient to room near nurses station  Outcome: Progressing  Goal: Maintain or return to baseline ADL function  Description: INTERVENTIONS:  -  Assess patient's ability to carry out ADLs; assess patient's baseline for ADL function and identify physical deficits which impact ability to perform ADLs (bathing, care of mouth/teeth, toileting, grooming, dressing, etc.)  - Assess/evaluate cause of self-care deficits   - Assess range of motion  - Assess patient's mobility; develop plan if impaired  - Assess patient's need for assistive devices and provide as appropriate  - Encourage maximum independence but intervene and supervise when necessary  - Involve family in performance of ADLs  - Assess for home care needs following discharge   - Consider OT consult to assist with ADL evaluation and planning for discharge  - Provide patient education as appropriate  - Monitor functional capacity and physical performance, use of AM PAC & JH-HLM   - Monitor gait, balance and fatigue with ambulation    Outcome: Progressing  Goal: Maintains/Returns to pre admission functional level  Description: INTERVENTIONS:  - Perform AM-PAC 6 Click Basic Mobility/ Daily Activity assessment daily.  - Set and communicate daily mobility goal to care team and patient/family/caregiver.   - Collaborate with rehabilitation services on mobility goals if  consulted  - Perform Range of Motion 4 times a day.  - Reposition patient every 2 hours.  - Dangle patient 3 times a day  - Stand patient 3 times a day  - Ambulate patient 3 times a day  - Out of bed to chair 3 times a day   - Out of bed for meals 3 times a day  - Out of bed for toileting  - Record patient progress and toleration of activity level   Outcome: Progressing     Problem: DISCHARGE PLANNING  Goal: Discharge to home or other facility with appropriate resources  Description: INTERVENTIONS:  - Identify barriers to discharge w/patient and caregiver  - Arrange for needed discharge resources and transportation as appropriate  - Identify discharge learning needs (meds, wound care, etc.)  - Arrange for interpretive services to assist at discharge as needed  - Refer to Case Management Department for coordinating discharge planning if the patient needs post-hospital services based on physician/advanced practitioner order or complex needs related to functional status, cognitive ability, or social support system  Outcome: Progressing     Problem: Knowledge Deficit  Goal: Patient/family/caregiver demonstrates understanding of disease process, treatment plan, medications, and discharge instructions  Description: Complete learning assessment and assess knowledge base.  Interventions:  - Provide teaching at level of understanding  - Provide teaching via preferred learning methods  Outcome: Progressing     Problem: GASTROINTESTINAL - ADULT  Goal: Minimal or absence of nausea and/or vomiting  Description: INTERVENTIONS:  - Administer IV fluids if ordered to ensure adequate hydration  - Maintain NPO status until nausea and vomiting are resolved  - Nasogastric tube if ordered  - Administer ordered antiemetic medications as needed  - Provide nonpharmacologic comfort measures as appropriate  - Advance diet as tolerated, if ordered  - Consider nutrition services referral to assist patient with adequate nutrition and appropriate  food choices  Outcome: Progressing  Goal: Maintains adequate nutritional intake  Description: INTERVENTIONS:  - Monitor percentage of each meal consumed  - Identify factors contributing to decreased intake, treat as appropriate  - Assist with meals as needed  - Monitor I&O, weight, and lab values if indicated  - Obtain nutrition services referral as needed  Outcome: Progressing     Problem: METABOLIC, FLUID AND ELECTROLYTES - ADULT  Goal: Electrolytes maintained within normal limits  Description: INTERVENTIONS:  - Monitor labs and assess patient for signs and symptoms of electrolyte imbalances  - Administer electrolyte replacement as ordered  - Monitor response to electrolyte replacements, including repeat lab results as appropriate  - Instruct patient on fluid and nutrition as appropriate  Outcome: Progressing  Goal: Fluid balance maintained  Description: INTERVENTIONS:  - Monitor labs   - Monitor I/O and WT  - Instruct patient on fluid and nutrition as appropriate  - Assess for signs & symptoms of volume excess or deficit  Outcome: Progressing  Goal: Glucose maintained within target range  Description: INTERVENTIONS:  - Monitor Blood Glucose as ordered  - Assess for signs and symptoms of hyperglycemia and hypoglycemia  - Administer ordered medications to maintain glucose within target range  - Assess nutritional intake and initiate nutrition service referral as needed  Outcome: Progressing     Problem: SKIN/TISSUE INTEGRITY - ADULT  Goal: Skin Integrity remains intact(Skin Breakdown Prevention)  Description: Assess:  -Perform Jose assessment every shift  -Clean and moisturize skin every shift  -Inspect skin when repositioning, toileting, and assisting with ADLS  -Assess under medical devices such as IV every shift  -Assess extremities for adequate circulation and sensation     Bed Management:  -Have minimal linens on bed & keep smooth, unwrinkled  -Change linens as needed when moist or perspiring  -Avoid sitting  or lying in one position for more than 2 hours while in bed  -Keep HOB at 30 degrees     Toileting:  -Offer bedside commode  -Assess for incontinence every shift  -Use incontinent care products after each incontinent episode such as foam cleanser    Activity:  -Mobilize patient 3 times a day  -Encourage activity and walks on unit  -Encourage or provide ROM exercises   -Turn and reposition patient every 2 Hours  -Use appropriate equipment to lift or move patient in bed  -Instruct/ Assist with weight shifting every hour when out of bed in chair  -Consider limitation of chair time 1 hour intervals    Skin Care:  -Avoid use of baby powder, tape, friction and shearing, hot water or constrictive clothing  -Relieve pressure over bony prominences using pillows  -Do not massage red bony areas    Next Steps:  -Teach patient strategies to minimize risks such as skin breakdown   -Consider consults to  interdisciplinary teams such as wound care  Outcome: Progressing     Problem: HEMATOLOGIC - ADULT  Goal: Maintains hematologic stability  Description: INTERVENTIONS  - Assess for signs and symptoms of bleeding or hemorrhage  - Monitor labs  - Administer supportive blood products/factors as ordered and appropriate  Outcome: Progressing     Problem: MUSCULOSKELETAL - ADULT  Goal: Maintain or return mobility to safest level of function  Description: INTERVENTIONS:  - Assess patient's ability to carry out ADLs; assess patient's baseline for ADL function and identify physical deficits which impact ability to perform ADLs (bathing, care of mouth/teeth, toileting, grooming, dressing, etc.)  - Assess/evaluate cause of self-care deficits   - Assess range of motion  - Assess patient's mobility  - Assess patient's need for assistive devices and provide as appropriate  - Encourage maximum independence but intervene and supervise when necessary  - Involve family in performance of ADLs  - Assess for home care needs following discharge   -  Consider OT consult to assist with ADL evaluation and planning for discharge  - Provide patient education as appropriate  Outcome: Progressing  Goal: Maintain proper alignment of affected body part  Description: INTERVENTIONS:  - Support, maintain and protect limb and body alignment  - Provide patient/ family with appropriate education  Outcome: Progressing     Problem: Prexisting or High Potential for Compromised Skin Integrity  Goal: Skin integrity is maintained or improved  Description: INTERVENTIONS:  - Identify patients at risk for skin breakdown  - Assess and monitor skin integrity including under and around medical devices   - Assess and monitor nutrition and hydration status  - Monitor labs  - Assess for incontinence   - Turn and reposition patient  - Assist with mobility/ambulation  - Relieve pressure over keith prominences   - Avoid friction and shearing  - Provide appropriate hygiene as needed including keeping skin clean and dry  - Evaluate need for skin moisturizer/barrier cream  - Collaborate with interdisciplinary team  - Patient/family teaching  - Consider wound care consult    Assess:  - Review Jose scale daily  - Clean and moisturize skin every shift  - Inspect skin when repositioning, toileting, and assisting with ADLS  - Assess under medical devices such as IV every shift  - Assess extremities for adequate circulation and sensation     Bed Management:  - Have minimal linens on bed & keep smooth, unwrinkled  - Change linens as needed when moist or perspiring  - Avoid sitting or lying in one position for more than 2 hours while in bed?Keep HOB at 30 degrees   - Toileting:  - Offer bedside commode  - Assess for incontinence every shift  - Use incontinent care products after each incontinent episode such as foam cleanser    Activity:  - Mobilize patient 3 times a day  - Encourage activity and walks on unit  - Encourage or provide ROM exercises   - Turn and reposition patient every 2 Hours  - Use  appropriate equipment to lift or move patient in bed  - Instruct/ Assist with weight shifting every hour when out of bed in chair  - Consider limitation of chair time 12 hour intervals    Skin Care:  - Avoid use of baby powder, tape, friction and shearing, hot water or constrictive clothing  - Relieve pressure over bony prominences using pillows  - Do not massage red bony areas    Next Steps:  - Teach patient strategies to minimize risks such as skin breakdown  - Consider consults to  interdisciplinary teams such as wound care  Outcome: Progressing     Problem: MOBILITY - ADULT  Goal: Maintain or return to baseline ADL function  Description: INTERVENTIONS:  -  Assess patient's ability to carry out ADLs; assess patient's baseline for ADL function and identify physical deficits which impact ability to perform ADLs (bathing, care of mouth/teeth, toileting, grooming, dressing, etc.)  - Assess/evaluate cause of self-care deficits   - Assess range of motion  - Assess patient's mobility; develop plan if impaired  - Assess patient's need for assistive devices and provide as appropriate  - Encourage maximum independence but intervene and supervise when necessary  - Involve family in performance of ADLs  - Assess for home care needs following discharge   - Consider OT consult to assist with ADL evaluation and planning for discharge  - Provide patient education as appropriate  - Monitor functional capacity and physical performance, use of AM PAC & JH-HLM   - Monitor gait, balance and fatigue with ambulation    Outcome: Progressing  Goal: Maintains/Returns to pre admission functional level  Description: INTERVENTIONS:  - Perform AM-PAC 6 Click Basic Mobility/ Daily Activity assessment daily.  - Set and communicate daily mobility goal to care team and patient/family/caregiver.   - Collaborate with rehabilitation services on mobility goals if consulted  - Perform Range of Motion 4 times a day.  - Reposition patient every 2  hours.  - Dangle patient 3 times a day  - Stand patient 3 times a day  - Ambulate patient 3 times a day  - Out of bed to chair 3 times a day   - Out of bed for meals 3 times a day  - Out of bed for toileting  - Record patient progress and toleration of activity level   Outcome: Progressing

## 2025-06-03 NOTE — BRIEF OP NOTE (RAD/CATH)
IR Right  PARACENTESIS Procedure Note    PATIENT NAME: Wes Araujo  : 1970  MRN: [id number]    Pre-op Diagnosis:   1. Acute encephalopathy    2. DKA (diabetic ketoacidosis) (HCC)    3. Uncontrolled hypertension    4. Alcohol abuse    5. Transaminitis    6. ZENAIDA (acute kidney injury) (HCC)    7. Alcohol-induced chronic pancreatitis (HCC)    8. Acute liver failure without hepatic coma    9. Bengali  needed      Post-op Diagnosis:   1. Acute encephalopathy    2. DKA (diabetic ketoacidosis) (HCC)    3. Uncontrolled hypertension    4. Alcohol abuse    5. Transaminitis    6. ZENAIDA (acute kidney injury) (HCC)    7. Alcohol-induced chronic pancreatitis (HCC)    8. Acute liver failure without hepatic coma    9. Bengali  needed        Provider:  Marcia Dubois PA-C     No qualified resident was available, Resident is only observing    Estimated Blood Loss: minimal    Findings: right sided paracentesis. 1770cc clear yellow fluid removed.     Specimens: sent    Complications:  none immediate    Anesthesia: local    Marcia Dubois PA-C     Date: 6/3/2025  Time: 11:26 AM

## 2025-06-03 NOTE — ASSESSMENT & PLAN NOTE
No evidence of blood loss.  Transfused 1 unit PRBC 5/28, and again 5/30  EGD and colonoscopy February with dieulafoy lesion but no other sources of bleeding  Evaluated by GI: no current endoscopy recommended  Monitor hemoglobin    Results from last 7 days   Lab Units 06/02/25  0523 06/01/25  0525 05/31/25  0508 05/30/25  0533 05/29/25  0459 05/28/25  0508   HEMOGLOBIN g/dL 8.3* 8.2* 8.9* 6.7* 7.2* 6.5*

## 2025-06-03 NOTE — ASSESSMENT & PLAN NOTE
Home medications: Hydralazine 25 mg p.o. every 8, Norvasc 5 mg p.o. twice daily  Current medications: coreg 6.25 mg BID, Norvasc 5 mg p.o. twice daily, hydralazine 25 mg p.o. every 8h

## 2025-06-03 NOTE — ASSESSMENT & PLAN NOTE
Evidence of worsening hyperbilirubinemia, elevated alkaline phosphatase and coagulopathy on admission  No significant transaminitis: Initially mild elevated AST  evaluated by gastroenterology.    Noted to have chronically elevated liver enzymes: Acute rise this admission was attributed to acute alcoholic hepatitis  Discriminant function did not meet criteria for steroids  Per GI: No stigmata of chronic liver disease on exam  Outpatient elastography versus liver biopsy for definitive diagnosis  Serologic workup negative for etiology of liver injury  Initial coagulopathy resolved with vitamin K  MRI liver: Pending, ordered by GI  Ruled out infection: Cultures no growth  Renal ultrasound: Moderate ascites  IR paracentesis 6/3: 1.7 L clear fluid  SAAG =2.5 c/w portal HTN    Results from last 7 days   Lab Units 06/03/25  0448 06/02/25  0523 06/01/25  0525 05/31/25  0508 05/30/25  0533 05/29/25  0459 05/28/25  0508   AST U/L 49* 45* 37 35 35 33 43*   ALT U/L 31 28 24 23 19 22 27   TOTAL BILIRUBIN mg/dL 8.47* 8.63* 9.11* 10.99* 7.84* 7.91* 7.26*     Results from last 7 days   Lab Units 06/02/25  0523 05/30/25  0533 05/28/25  0508   INR  1.09 1.07 1.06

## 2025-06-03 NOTE — OCCUPATIONAL THERAPY NOTE
Occupational Therapy Cancel Note:    Patient Name: Wes Araujo  Today's Date: 6/3/2025    Chart reviewed. Pt off floor at IR for paracentesis. Will cx and complete OT session at later time as schedule permits.    Barby White, OTR/L

## 2025-06-04 LAB
ALBUMIN SERPL BCG-MCNC: 4.1 G/DL (ref 3.5–5)
ALP SERPL-CCNC: 385 U/L (ref 34–104)
ALT SERPL W P-5'-P-CCNC: 30 U/L (ref 7–52)
ANION GAP SERPL CALCULATED.3IONS-SCNC: 10 MMOL/L (ref 4–13)
AST SERPL W P-5'-P-CCNC: 44 U/L (ref 13–39)
BASOPHILS # BLD AUTO: 0.03 THOUSANDS/ÂΜL (ref 0–0.1)
BASOPHILS NFR BLD AUTO: 0 % (ref 0–1)
BILIRUB DIRECT SERPL-MCNC: 5.81 MG/DL (ref 0–0.2)
BILIRUB SERPL-MCNC: 8.85 MG/DL (ref 0.2–1)
BUN SERPL-MCNC: 36 MG/DL (ref 5–25)
CALCIUM SERPL-MCNC: 9.1 MG/DL (ref 8.4–10.2)
CHLORIDE SERPL-SCNC: 107 MMOL/L (ref 96–108)
CO2 SERPL-SCNC: 17 MMOL/L (ref 21–32)
CREAT SERPL-MCNC: 2.91 MG/DL (ref 0.6–1.3)
EOSINOPHIL # BLD AUTO: 0.32 THOUSAND/ÂΜL (ref 0–0.61)
EOSINOPHIL NFR BLD AUTO: 3 % (ref 0–6)
ERYTHROCYTE [DISTWIDTH] IN BLOOD BY AUTOMATED COUNT: 15.6 % (ref 11.6–15.1)
GFR SERPL CREATININE-BSD FRML MDRD: 23 ML/MIN/1.73SQ M
GLUCOSE SERPL-MCNC: 133 MG/DL (ref 65–140)
GLUCOSE SERPL-MCNC: 146 MG/DL (ref 65–140)
GLUCOSE SERPL-MCNC: 189 MG/DL (ref 65–140)
GLUCOSE SERPL-MCNC: 205 MG/DL (ref 65–140)
GLUCOSE SERPL-MCNC: 209 MG/DL (ref 65–140)
HCT VFR BLD AUTO: 21.6 % (ref 36.5–49.3)
HGB BLD-MCNC: 7.6 G/DL (ref 12–17)
IMM GRANULOCYTES # BLD AUTO: 0.1 THOUSAND/UL (ref 0–0.2)
IMM GRANULOCYTES NFR BLD AUTO: 1 % (ref 0–2)
INR PPP: 1.16 (ref 0.85–1.19)
LYMPHOCYTES # BLD AUTO: 1.68 THOUSANDS/ÂΜL (ref 0.6–4.47)
LYMPHOCYTES NFR BLD AUTO: 17 % (ref 14–44)
MCH RBC QN AUTO: 32.3 PG (ref 26.8–34.3)
MCHC RBC AUTO-ENTMCNC: 35.2 G/DL (ref 31.4–37.4)
MCV RBC AUTO: 92 FL (ref 82–98)
MONOCYTES # BLD AUTO: 1.41 THOUSAND/ÂΜL (ref 0.17–1.22)
MONOCYTES NFR BLD AUTO: 15 % (ref 4–12)
NEUTROPHILS # BLD AUTO: 6.18 THOUSANDS/ÂΜL (ref 1.85–7.62)
NEUTS SEG NFR BLD AUTO: 64 % (ref 43–75)
NRBC BLD AUTO-RTO: 0 /100 WBCS
PLATELET # BLD AUTO: 153 THOUSANDS/UL (ref 149–390)
PMV BLD AUTO: 12.7 FL (ref 8.9–12.7)
POTASSIUM SERPL-SCNC: 4.9 MMOL/L (ref 3.5–5.3)
PROT SERPL-MCNC: 6 G/DL (ref 6.4–8.4)
PROTHROMBIN TIME: 15 SECONDS (ref 12.3–15)
RBC # BLD AUTO: 2.35 MILLION/UL (ref 3.88–5.62)
SODIUM SERPL-SCNC: 134 MMOL/L (ref 135–147)
WBC # BLD AUTO: 9.72 THOUSAND/UL (ref 4.31–10.16)

## 2025-06-04 PROCEDURE — 85025 COMPLETE CBC W/AUTO DIFF WBC: CPT | Performed by: INTERNAL MEDICINE

## 2025-06-04 PROCEDURE — 85610 PROTHROMBIN TIME: CPT | Performed by: INTERNAL MEDICINE

## 2025-06-04 PROCEDURE — 80076 HEPATIC FUNCTION PANEL: CPT | Performed by: INTERNAL MEDICINE

## 2025-06-04 PROCEDURE — 82948 REAGENT STRIP/BLOOD GLUCOSE: CPT

## 2025-06-04 PROCEDURE — 99232 SBSQ HOSP IP/OBS MODERATE 35: CPT | Performed by: INTERNAL MEDICINE

## 2025-06-04 PROCEDURE — 80048 BASIC METABOLIC PNL TOTAL CA: CPT | Performed by: INTERNAL MEDICINE

## 2025-06-04 RX ADMIN — AMLODIPINE BESYLATE 5 MG: 5 TABLET ORAL at 08:17

## 2025-06-04 RX ADMIN — INSULIN LISPRO 1 UNITS: 100 INJECTION, SOLUTION INTRAVENOUS; SUBCUTANEOUS at 08:18

## 2025-06-04 RX ADMIN — ALBUMIN (HUMAN) 25 G: 0.25 INJECTION, SOLUTION INTRAVENOUS at 00:54

## 2025-06-04 RX ADMIN — INSULIN LISPRO 1 UNITS: 100 INJECTION, SOLUTION INTRAVENOUS; SUBCUTANEOUS at 16:38

## 2025-06-04 RX ADMIN — INSULIN ASPART 5 UNITS: 100 INJECTION, SUSPENSION SUBCUTANEOUS at 08:18

## 2025-06-04 RX ADMIN — LIDOCAINE 1 PATCH: 50 PATCH CUTANEOUS at 08:17

## 2025-06-04 RX ADMIN — AMLODIPINE BESYLATE 5 MG: 5 TABLET ORAL at 16:43

## 2025-06-04 RX ADMIN — HYDRALAZINE HYDROCHLORIDE 25 MG: 25 TABLET ORAL at 22:20

## 2025-06-04 RX ADMIN — DICLOFENAC SODIUM 2 G: 10 GEL TOPICAL at 22:18

## 2025-06-04 RX ADMIN — HYDRALAZINE HYDROCHLORIDE 25 MG: 25 TABLET ORAL at 06:25

## 2025-06-04 RX ADMIN — HEPARIN SODIUM 5000 UNITS: 5000 INJECTION INTRAVENOUS; SUBCUTANEOUS at 06:22

## 2025-06-04 RX ADMIN — HEPARIN SODIUM 5000 UNITS: 5000 INJECTION INTRAVENOUS; SUBCUTANEOUS at 22:21

## 2025-06-04 RX ADMIN — FOLIC ACID 1 MG: 1 TABLET ORAL at 08:17

## 2025-06-04 RX ADMIN — Medication 100 MG: at 08:17

## 2025-06-04 RX ADMIN — CARVEDILOL 6.25 MG: 6.25 TABLET, FILM COATED ORAL at 08:17

## 2025-06-04 RX ADMIN — HEPARIN SODIUM 5000 UNITS: 5000 INJECTION INTRAVENOUS; SUBCUTANEOUS at 16:38

## 2025-06-04 RX ADMIN — CARVEDILOL 6.25 MG: 6.25 TABLET, FILM COATED ORAL at 16:38

## 2025-06-04 RX ADMIN — INSULIN ASPART 5 UNITS: 100 INJECTION, SUSPENSION SUBCUTANEOUS at 16:38

## 2025-06-04 NOTE — ASSESSMENT & PLAN NOTE
No evidence of blood loss.  Transfused 1 unit PRBC 5/28, and again 5/30  EGD and colonoscopy February with dieulafoy lesion but no other sources of bleeding  Evaluated by GI: no current endoscopy recommended  Monitor hemoglobin    Results from last 7 days   Lab Units 06/04/25  0426 06/02/25  0523 06/01/25  0525 05/31/25  0508 05/30/25  0533 05/29/25  0459   HEMOGLOBIN g/dL 7.6* 8.3* 8.2* 8.9* 6.7* 7.2*

## 2025-06-04 NOTE — PLAN OF CARE
Problem: PAIN - ADULT  Goal: Verbalizes/displays adequate comfort level or baseline comfort level  Description: Interventions:  - Encourage patient to monitor pain and request assistance  - Assess pain using appropriate pain scale  - Administer analgesics as ordered based on type and severity of pain and evaluate response  - Implement non-pharmacological measures as appropriate and evaluate response  - Consider cultural and social influences on pain and pain management  - Notify physician/advanced practitioner if interventions unsuccessful or patient reports new pain  - Educate patient/family on pain management process including their role and importance of  reporting pain   - Provide non-pharmacologic/complimentary pain relief interventions  Outcome: Progressing     Problem: INFECTION - ADULT  Goal: Absence or prevention of progression during hospitalization  Description: INTERVENTIONS:  - Assess and monitor for signs and symptoms of infection  - Monitor lab/diagnostic results  - Monitor all insertion sites, i.e. indwelling lines, tubes, and drains  - Monitor endotracheal if appropriate and nasal secretions for changes in amount and color  - [place] appropriate cooling/warming therapies per order  - Administer medications as ordered  - Instruct and encourage patient and family to use good hand hygiene technique  - Identify and instruct in appropriate isolation precautions for identified infection/condition  Outcome: Progressing  Goal: Absence of fever/infection during neutropenic period  Description: INTERVENTIONS:  - Monitor WBC  - Perform strict hand hygiene  - Limit to healthy visitors only  - No plants, dried, fresh or silk flowers with [place]cl[place] in patient room  Outcome: Progressing     Problem: SAFETY ADULT  Goal: Patient will remain free of falls  Description: INTERVENTIONS:  - Educate patient/family on patient safety including physical limitations  - Instruct patient to call for assistance with activity   -  Consider consulting OT/PT to assist with strengthening/mobility based on AM PAC & JH-HLM score  - Consult OT/PT to assist with strengthening/mobility   - Keep Call bell within reach  - Keep bed low and locked with side rails adjusted as appropriate  - Keep care items and personal belongings within reach  - Initiate and maintain comfort rounds  - Make Fall Risk Sign visible to staff  - Offer Toileting every 2 Hours, in advance of need  - Initiate/Maintain bed alarm  - Obtain necessary fall risk management equipment: alarms  - Apply yellow socks and bracelet for high fall risk patients  - Consider moving patient to room near nurses station  Outcome: Progressing  Goal: Maintain or return to baseline ADL function  Description: INTERVENTIONS:  -  Assess patient's ability to carry out ADLs; assess patient's baseline for ADL function and identify physical deficits which impact ability to perform ADLs (bathing, care of mouth/teeth, toileting, grooming, dressing, etc.)  - Assess/evaluate cause of self-care deficits   - Assess range of motion  - Assess patient's mobility; develop plan if impaired  - Assess patient's need for assistive devices and provide as appropriate  - Encourage maximum independence but intervene and supervise when necessary  - Involve family in performance of ADLs  - Assess for home care needs following discharge   - Consider OT consult to assist with ADL evaluation and planning for discharge  - Provide patient education as appropriate  - Monitor functional capacity and physical performance, use of AM PAC & JH-HLM   - Monitor gait, balance and fatigue with ambulation    Outcome: Progressing  Goal: Maintains/Returns to pre admission functional level  Description: INTERVENTIONS:  - Perform AM-PAC 6 Click Basic Mobility/ Daily Activity assessment daily.  - Set and communicate daily mobility goal to care team and patient/family/caregiver.   - Collaborate with rehabilitation services on mobility goals if  consulted  - Perform Range of Motion 4 times a day.  - Reposition patient every 2 hours.  - Dangle patient 3 times a day  - Stand patient 3 times a day  - Ambulate patient 3 times a day  - Out of bed to chair 3 times a day   - Out of bed for meals 3 times a day  - Out of bed for toileting  - Record patient progress and toleration of activity level   Outcome: Progressing     Problem: DISCHARGE PLANNING  Goal: Discharge to home or other facility with appropriate resources  Description: INTERVENTIONS:  - Identify barriers to discharge w/patient and caregiver  - Arrange for needed discharge resources and transportation as appropriate  - Identify discharge learning needs (meds, wound care, etc.)  - Arrange for interpretive services to assist at discharge as needed  - Refer to Case Management Department for coordinating discharge planning if the patient needs post-hospital services based on physician/advanced practitioner order or complex needs related to functional status, cognitive ability, or social support system  Outcome: Progressing     Problem: Knowledge Deficit  Goal: Patient/family/caregiver demonstrates understanding of disease process, treatment plan, medications, and discharge instructions  Description: Complete learning assessment and assess knowledge base.  Interventions:  - Provide teaching at level of understanding  - Provide teaching via preferred learning methods  Outcome: Progressing     Problem: GASTROINTESTINAL - ADULT  Goal: Minimal or absence of nausea and/or vomiting  Description: INTERVENTIONS:  - Administer IV fluids if ordered to ensure adequate hydration  - Maintain NPO status until nausea and vomiting are resolved  - Nasogastric tube if ordered  - Administer ordered antiemetic medications as needed  - Provide nonpharmacologic comfort measures as appropriate  - Advance diet as tolerated, if ordered  - Consider nutrition services referral to assist patient with adequate nutrition and appropriate  food choices  Outcome: Progressing  Goal: Maintains adequate nutritional intake  Description: INTERVENTIONS:  - Monitor percentage of each meal consumed  - Identify factors contributing to decreased intake, treat as appropriate  - Assist with meals as needed  - Monitor I&O, weight, and lab values if indicated  - Obtain nutrition services referral as needed  Outcome: Progressing     Problem: METABOLIC, FLUID AND ELECTROLYTES - ADULT  Goal: Electrolytes maintained within normal limits  Description: INTERVENTIONS:  - Monitor labs and assess patient for signs and symptoms of electrolyte imbalances  - Administer electrolyte replacement as ordered  - Monitor response to electrolyte replacements, including repeat lab results as appropriate  - Instruct patient on fluid and nutrition as appropriate  Outcome: Progressing  Goal: Fluid balance maintained  Description: INTERVENTIONS:  - Monitor labs   - Monitor I/O and WT  - Instruct patient on fluid and nutrition as appropriate  - Assess for signs & symptoms of volume excess or deficit  Outcome: Progressing  Goal: Glucose maintained within target range  Description: INTERVENTIONS:  - Monitor Blood Glucose as ordered  - Assess for signs and symptoms of hyperglycemia and hypoglycemia  - Administer ordered medications to maintain glucose within target range  - Assess nutritional intake and initiate nutrition service referral as needed  Outcome: Progressing     Problem: SKIN/TISSUE INTEGRITY - ADULT  Goal: Skin Integrity remains intact(Skin Breakdown Prevention)  Description: Assess:  -Perform Jose assessment every shift  -Clean and moisturize skin every shift  -Inspect skin when repositioning, toileting, and assisting with ADLS  -Assess under medical devices such as IV every shift  -Assess extremities for adequate circulation and sensation     Bed Management:  -Have minimal linens on bed & keep smooth, unwrinkled  -Change linens as needed when moist or perspiring  -Avoid sitting  or lying in one position for more than 2 hours while in bed  -Keep HOB at 30 degrees     Toileting:  -Offer bedside commode  -Assess for incontinence every shift  -Use incontinent care products after each incontinent episode such as foam cleanser    Activity:  -Mobilize patient 3 times a day  -Encourage activity and walks on unit  -Encourage or provide ROM exercises   -Turn and reposition patient every 2 Hours  -Use appropriate equipment to lift or move patient in bed  -Instruct/ Assist with weight shifting every hour when out of bed in chair  -Consider limitation of chair time 1 hour intervals    Skin Care:  -Avoid use of baby powder, tape, friction and shearing, hot water or constrictive clothing  -Relieve pressure over bony prominences using pillows  -Do not massage red bony areas    Next Steps:  -Teach patient strategies to minimize risks such as skin breakdown   -Consider consults to  interdisciplinary teams such as wound care  Outcome: Progressing     Problem: HEMATOLOGIC - ADULT  Goal: Maintains hematologic stability  Description: INTERVENTIONS  - Assess for signs and symptoms of bleeding or hemorrhage  - Monitor labs  - Administer supportive blood products/factors as ordered and appropriate  Outcome: Progressing     Problem: MUSCULOSKELETAL - ADULT  Goal: Maintain or return mobility to safest level of function  Description: INTERVENTIONS:  - Assess patient's ability to carry out ADLs; assess patient's baseline for ADL function and identify physical deficits which impact ability to perform ADLs (bathing, care of mouth/teeth, toileting, grooming, dressing, etc.)  - Assess/evaluate cause of self-care deficits   - Assess range of motion  - Assess patient's mobility  - Assess patient's need for assistive devices and provide as appropriate  - Encourage maximum independence but intervene and supervise when necessary  - Involve family in performance of ADLs  - Assess for home care needs following discharge   -  Consider OT consult to assist with ADL evaluation and planning for discharge  - Provide patient education as appropriate  Outcome: Progressing  Goal: Maintain proper alignment of affected body part  Description: INTERVENTIONS:  - Support, maintain and protect limb and body alignment  - Provide patient/ family with appropriate education  Outcome: Progressing     Problem: Prexisting or High Potential for Compromised Skin Integrity  Goal: Skin integrity is maintained or improved  Description: INTERVENTIONS:  - Identify patients at risk for skin breakdown  - Assess and monitor skin integrity including under and around medical devices   - Assess and monitor nutrition and hydration status  - Monitor labs  - Assess for incontinence   - Turn and reposition patient  - Assist with mobility/ambulation  - Relieve pressure over keith prominences   - Avoid friction and shearing  - Provide appropriate hygiene as needed including keeping skin clean and dry  - Evaluate need for skin moisturizer/barrier cream  - Collaborate with interdisciplinary team  - Patient/family teaching  - Consider wound care consult    Assess:  - Review Jose scale daily  - Clean and moisturize skin every shift  - Inspect skin when repositioning, toileting, and assisting with ADLS  - Assess under medical devices such as IV every shift  - Assess extremities for adequate circulation and sensation     Bed Management:  - Have minimal linens on bed & keep smooth, unwrinkled  - Change linens as needed when moist or perspiring  - Avoid sitting or lying in one position for more than 2 hours while in bed?Keep HOB at 30 degrees   - Toileting:  - Offer bedside commode  - Assess for incontinence every shift  - Use incontinent care products after each incontinent episode such as foam cleanser    Activity:  - Mobilize patient 3 times a day  - Encourage activity and walks on unit  - Encourage or provide ROM exercises   - Turn and reposition patient every 2 Hours  - Use  appropriate equipment to lift or move patient in bed  - Instruct/ Assist with weight shifting every hour when out of bed in chair  - Consider limitation of chair time 12 hour intervals    Skin Care:  - Avoid use of baby powder, tape, friction and shearing, hot water or constrictive clothing  - Relieve pressure over bony prominences using pillows  - Do not massage red bony areas    Next Steps:  - Teach patient strategies to minimize risks such as skin breakdown  - Consider consults to  interdisciplinary teams such as wound care  Outcome: Progressing     Problem: MOBILITY - ADULT  Goal: Maintain or return to baseline ADL function  Description: INTERVENTIONS:  -  Assess patient's ability to carry out ADLs; assess patient's baseline for ADL function and identify physical deficits which impact ability to perform ADLs (bathing, care of mouth/teeth, toileting, grooming, dressing, etc.)  - Assess/evaluate cause of self-care deficits   - Assess range of motion  - Assess patient's mobility; develop plan if impaired  - Assess patient's need for assistive devices and provide as appropriate  - Encourage maximum independence but intervene and supervise when necessary  - Involve family in performance of ADLs  - Assess for home care needs following discharge   - Consider OT consult to assist with ADL evaluation and planning for discharge  - Provide patient education as appropriate  - Monitor functional capacity and physical performance, use of AM PAC & JH-HLM   - Monitor gait, balance and fatigue with ambulation    Outcome: Progressing  Goal: Maintains/Returns to pre admission functional level  Description: INTERVENTIONS:  - Perform AM-PAC 6 Click Basic Mobility/ Daily Activity assessment daily.  - Set and communicate daily mobility goal to care team and patient/family/caregiver.   - Collaborate with rehabilitation services on mobility goals if consulted  - Perform Range of Motion 4 times a day.  - Reposition patient every 2  hours.  - Dangle patient 3 times a day  - Stand patient 3 times a day  - Ambulate patient 3 times a day  - Out of bed to chair 3 times a day   - Out of bed for meals 3 times a day  - Out of bed for toileting  - Record patient progress and toleration of activity level   Outcome: Progressing

## 2025-06-04 NOTE — ASSESSMENT & PLAN NOTE
Patient presented with acute kidney injury with a creatinine of 1.99  History of CKD 3: baseline creatinine approximately 1.5-2.0  Appreciate nephrology evaluation and recommendations: Secondary to prerenal azotemia, diuresis, subsequent ATN  in the setting of acute liver failure  Creatinine has since worsened to 2.9  Status post IV fluids  Renal ultrasound: No hydronephrosis.  Increased echogenicity consistent with medical renal disease  Patient started on IV albumin and sodium bicarbonate infusion by nephrology  Urine sodium not consistent with hepatorenal syndrome      Results from last 7 days   Lab Units 06/04/25  0426 06/03/25  0448 06/02/25  0523 06/01/25  0525 05/31/25  0508 05/30/25  0533 05/29/25  0459   BUN mg/dL 36* 39* 37* 36* 30* 24 21   CREATININE mg/dL 2.91* 2.94* 2.76* 2.68* 2.67* 2.55* 2.41*   EGFR ml/min/1.73sq m 23 22 24 25 25 27 29

## 2025-06-04 NOTE — ASSESSMENT & PLAN NOTE
Patient complains of low back pain across his bilateral lower back, radiating down both legs to his toes.  Denies any numbness.  States it has been present for many months  Patient had previous workups with x-rays of lumbar spine  X-ray lumbar spine: No acute abnormality  Continue ambulation  Continue Tylenol/Lidoderm/Voltaren/heating  Pain since resolved

## 2025-06-04 NOTE — PROGRESS NOTES
NEPHROLOGY HOSPITAL PROGRESS NOTE   Wes Araujo 55 y.o. male MRN: [id number]  Unit/Bed#: [place] [address] -[id number] Encounter: [id number]  Reason for Consult: ZENAIDA    Assessment & Plan  Acute kidney injury (HCC)  ZENAIDA most likely secondary to prerenal azotemia secondary to osmotic diuresis plus some component of intravascular volume depletion in light of hypoalbuminemia with subsequent ATN versus HRS in the presence of acute liver injury/hyperbilirubinemia  After review of records it appears that the patient has a baseline Creatinine of 1.5-2.0 mg/dL.  Admission creatinine of 1.99 mg/dL on 5/23.  Status post albumin with minimal improvement.  Urine sodium 46, urine microscopy 10-20 RBCs, 4-10 white cells and 10-25 hyaline cast.  CKD stage 3b, GFR 30-44 ml/min (formerly [facility])  After review of records it appears that the patient has a baseline Creatinine of 1.5-2.0 mg/dL  Likely has underlying CKD secondary to poorly controlled diabetes plus hypertensive nephrosclerosis plus age-related nephron loss  Hyponatremia  Stable at 134  Primary hypertension    Home medications: Hydralazine 25 mg p.o. every 8, Norvasc 5 mg p.o. twice daily  Current medications: coreg 6.25 mg BID, Norvasc 5 mg p.o. twice daily, hydralazine 25 mg p.o. every 8h  Type 2 diabetes mellitus with hyperglycemia, with long-term current use of insulin (HCC)  Lab Results   Component Value Date    HGBA1C 9.6 (H) 05/26/2025   On insulin  Recommend tight glycemic control  Alcoholic hepatitis with ascites  Monitor for withdrawal  Alcohol rehab on discharge  History of atrial fibrillation  Follow-up with cardiology  Abnormal LFTs  Management as per gastroenterology.  Consideration for possible liver biopsy.  Post paracentesis  MRI of the abdomen pending  Other specified anemias  Transfuse as needed, s/p PRBC already this admission  Hyperkalemia  Stable currently   Metabolic acidosis  Refractory, continue sodium bicarbonate infusion  Chronic bilateral low back pain with bilateral  sciatica      Summary:  Renal function overall stable today with a creatinine 2.9  Urine sodium not consistent with hepatorenal syndrome  Suspecting possible component of ATN  For now we will continue with albumin plus fluids (sodium bicarb infusion)  Hopeful improvement over the next 24 to 48 hours.    SUBJECTIVE / 24H INTERVAL HISTORY:  Seen and examined.  Patient offers no new complaints.  Status post paracentesis.  No reported chest pain shortness of breath.    OBJECTIVE:  Current Weight: Weight - Scale: 60.9 kg (134 lb 4.2 oz)  Vitals:    06/03/25 2128 06/04/25 0524 06/04/25 0531 06/04/25 0624   BP: 128/66 160/67  158/70   Pulse: 75 78  78   Resp:  18     Temp: 98 °F (36.7 °C) 99.3 °F (37.4 °C)     TempSrc: Oral Oral     SpO2: 95% 97%  95%   Weight:   60.9 kg (134 lb 4.2 oz)    Height:           Intake/Output Summary (Last 24 hours) at 6/4/2025 1010  Last data filed at 6/4/2025 0506  Gross per 24 hour   Intake 1010 ml   Output 2420 ml   Net -1410 ml       Physical Exam  Constitutional:       Appearance: He is not ill-appearing.     Cardiovascular:      Rate and Rhythm: Normal rate and regular rhythm.   Pulmonary:      Effort: Pulmonary effort is normal.      Breath sounds: Normal breath sounds.   Abdominal:      Palpations: Abdomen is soft.      Tenderness: There is no guarding.     Musculoskeletal:      Right lower leg: No edema.      Left lower leg: No edema.     Skin:     General: Skin is warm and dry.      Findings: No rash.     Neurological:      Mental Status: He is alert. Mental status is at baseline.         Medications:  Current Medications[1]    Laboratory Results:  Results from last 7 days   Lab Units 06/04/25  0426 06/03/25  0448 06/02/25  0523 06/01/25  0525 05/31/25  0508 05/30/25  0533 05/29/25  0459   WBC Thousand/uL 9.72  --  9.14 9.92 11.51* 9.68 9.70   HEMOGLOBIN g/dL 7.6*  --  8.3* 8.2* 8.9* 6.7* 7.2*   HEMATOCRIT % 21.6*  --  24.6* 23.7* 25.8* 19.7* 20.9*   PLATELETS Thousands/uL 153  --   "182 195 220 208 219   POTASSIUM mmol/L 4.9 5.0 4.9 5.0 4.8 5.3 5.6*   CHLORIDE mmol/L 107 110* 109* 108 108 108 109*   CO2 mmol/L 17* 15* 16* 17* 20* 16* 21   BUN mg/dL 36* 39* 37* 36* 30* 24 21   CREATININE mg/dL 2.91* 2.94* 2.76* 2.68* 2.67* 2.55* 2.41*   CALCIUM mg/dL 9.1 9.0 8.9 8.8 9.1 8.5 9.0   MAGNESIUM mg/dL  --   --   --  1.9  --   --   --    PHOSPHORUS mg/dL  --   --   --  3.3 3.0  --  2.4*       Portions of the record may have been created with voice recognition software. Occasional wrong word or \"sound a like\" substitutions may have occurred due to the inherent limitations of voice recognition software. Read the chart carefully and recognize, using context, where substitutions have occurred.If you have any questions, please contact the dictating provider.       [1]   Current Facility-Administered Medications:     acetaminophen (TYLENOL) tablet 325 mg, 325 mg, Oral, Q6H PRN, ARIANE Moore, 325 mg at 06/01/25 1029    amLODIPine (NORVASC) tablet 5 mg, 5 mg, Oral, BID, Bibi Solis MD, 5 mg at 06/04/25 0817    carvedilol (COREG) tablet 6.25 mg, 6.25 mg, Oral, BID With Meals, Sonja Siegel PA-C, 6.25 mg at 06/04/25 0817    Diclofenac Sodium (VOLTAREN) 1 % topical gel 2 g, 2 g, Topical, 4x Daily, ARIANE Moore, 2 g at 06/03/25 1100    folic acid (FOLVITE) tablet 1 mg, 1 mg, Oral, Daily, Bala Leroy DO, 1 mg at 06/04/25 0817    heparin (porcine) subcutaneous injection 5,000 Units, 5,000 Units, Subcutaneous, Q8H JAISON, Mojgan Young MD, 5,000 Units at 06/04/25 0622    hydrALAZINE (APRESOLINE) injection 10 mg, 10 mg, Intravenous, Q4H PRN, ARIANE Leggett, 10 mg at 05/25/25 0318    hydrALAZINE (APRESOLINE) tablet 25 mg, 25 mg, Oral, Q8H JAISON, Sonja Siegel PA-C, 25 mg at 06/04/25 0625    insulin aspart protamine-insulin aspart (NovoLOG 70/30) 100 units/mL subcutaneous injection 5 Units, 5 Units, Subcutaneous, BID AC, ARIANE Leggett, 5 Units at 06/04/25 0818    insulin lispro " (HumALOG/ADMELOG) 100 units/mL subcutaneous injection 1-5 Units, 1-5 Units, Subcutaneous, TID AC, 1 Units at 06/04/25 0818 **AND** Fingerstick Glucose (POCT), , , TID AC, ARIANE Leggett    insulin lispro (HumALOG/ADMELOG) 100 units/mL subcutaneous injection 1-5 Units, 1-5 Units, Subcutaneous, HS, ARIANE Leggett, 1 Units at 06/03/25 2123    lidocaine (LIDODERM) 5 % patch 1 patch, 1 patch, Topical, Daily, ARIANE Moore, 1 patch at 06/04/25 0817    ondansetron (ZOFRAN) injection 4 mg, 4 mg, Intravenous, Q4H PRN, Bala Leroy DO    thiamine tablet 100 mg, 100 mg, Oral, Daily, Bala Leroy DO, 100 mg at 06/04/25 0817

## 2025-06-04 NOTE — ASSESSMENT & PLAN NOTE
Patient with a history of chronic pancreatitis secondary to alcohol  Initially presented with abdominal pain nausea/vomiting, since improved  MRCP with evidence of chronic pancreatitis and chronic obstructing pancreatic duct stone  Continue diet, and complete alcohol cessation  Continue proton pump inhibitor

## 2025-06-04 NOTE — ASSESSMENT & PLAN NOTE
Lab Results   Component Value Date    HGBA1C 9.6 (H) 05/26/2025     Recent Labs     06/03/25  1115 06/03/25  1612 06/03/25 2035 06/04/25  0614   POCGLU 183* 272* 205* 205*     HHNK upon arrival likely related to noncompliance with medications and alcohol use  Restarted on 70/30 at 5 units twice daily   Continue to monitor and titrate as needed

## 2025-06-04 NOTE — ASSESSMENT & PLAN NOTE
Evidence of worsening hyperbilirubinemia, elevated alkaline phosphatase and coagulopathy on admission  No significant transaminitis: Initially mild elevated AST  evaluated by gastroenterology.    Noted to have chronically elevated liver enzymes: Acute rise this admission was attributed to acute alcoholic hepatitis  Discriminant function did not meet criteria for steroids  Per GI: No stigmata of chronic liver disease on exam  Outpatient elastography versus liver biopsy for definitive diagnosis  Serologic workup negative for etiology of liver injury  Initial coagulopathy resolved with vitamin K  MRCP: Obstructing pancreatic head calculi.  Seen previously.  Stable main pancreatic ductal dilatation.  No intrahepatic biliary ductal dilatation.  Ruled out infection: Cultures no growth  Renal ultrasound: Moderate ascites  IR paracentesis 6/3: 1.7 L clear fluid  SAAG =2.5 c/w portal HTN    Results from last 7 days   Lab Units 06/04/25  0426 06/03/25  0448 06/02/25  0523 06/01/25  0525 05/31/25  0508 05/30/25  0533 05/29/25  0459   AST U/L 44* 49* 45* 37 35 35 33   ALT U/L 30 31 28 24 23 19 22   TOTAL BILIRUBIN mg/dL 8.85* 8.47* 8.63* 9.11* 10.99* 7.84* 7.91*     Results from last 7 days   Lab Units 06/04/25  0426 06/02/25  0523 05/30/25  0533   INR  1.16 1.09 1.07

## 2025-06-04 NOTE — ASSESSMENT & PLAN NOTE
ZENAIDA most likely secondary to prerenal azotemia secondary to osmotic diuresis plus some component of intravascular volume depletion in light of hypoalbuminemia with subsequent ATN versus HRS in the presence of acute liver injury/hyperbilirubinemia  After review of records it appears that the patient has a baseline Creatinine of 1.5-2.0 mg/dL.  Admission creatinine of 1.99 mg/dL on 5/23.  Status post albumin with minimal improvement.  Urine sodium 46, urine microscopy 10-20 RBCs, 4-10 white cells and 10-25 hyaline cast.

## 2025-06-04 NOTE — PROGRESS NOTES
Progress Note - Hospitalist   Name: Wes Araujo 55 y.o. male I MRN: [id number]  Unit/Bed#: [place] [address] -01 I Date of Admission: 5/23/2025   Date of Service: 6/4/2025 I Hospital Day: 12    Assessment & Plan  Toxic metabolic encephalopathy  History of atrial fibrillation hypertension alcohol liver disease who presented to the hospital for change in mental status found to have hyperglycemia    Admitted to ICU and stabilized  Altered mental status attributed to toxic metabolic encephalopathy secondary to hyperglycemia with HHNK, acute kidney injury, and acute liver failure  Clinically improved back to baseline  Acute renal failure superimposed on stage 3 chronic kidney disease (HCC)  Patient presented with acute kidney injury with a creatinine of 1.99  History of CKD 3: baseline creatinine approximately 1.5-2.0  Appreciate nephrology evaluation and recommendations: Secondary to prerenal azotemia, diuresis, subsequent ATN  in the setting of acute liver failure  Creatinine has since worsened to 2.9  Status post IV fluids  Renal ultrasound: No hydronephrosis.  Increased echogenicity consistent with medical renal disease  Patient started on IV albumin and sodium bicarbonate infusion by nephrology  Urine sodium not consistent with hepatorenal syndrome      Results from last 7 days   Lab Units 06/04/25  0426 06/03/25  0448 06/02/25  0523 06/01/25  0525 05/31/25  0508 05/30/25  0533 05/29/25  0459   BUN mg/dL 36* 39* 37* 36* 30* 24 21   CREATININE mg/dL 2.91* 2.94* 2.76* 2.68* 2.67* 2.55* 2.41*   EGFR ml/min/1.73sq m 23 22 24 25 25 27 29     Abnormal LFTs  Evidence of worsening hyperbilirubinemia, elevated alkaline phosphatase and coagulopathy on admission  No significant transaminitis: Initially mild elevated AST  evaluated by gastroenterology.    Noted to have chronically elevated liver enzymes: Acute rise this admission was attributed to acute alcoholic hepatitis  Discriminant function did not meet criteria for  steroids  Per GI: No stigmata of chronic liver disease on exam  Outpatient elastography versus liver biopsy for definitive diagnosis  Serologic workup negative for etiology of liver injury  Initial coagulopathy resolved with vitamin K  MRCP: Obstructing pancreatic head calculi.  Seen previously.  Stable main pancreatic ductal dilatation.  No intrahepatic biliary ductal dilatation.  Ruled out infection: Cultures no growth  Renal ultrasound: Moderate ascites  IR paracentesis 6/3: 1.7 L clear fluid  SAAG =2.5 c/w portal HTN    Results from last 7 days   Lab Units 06/04/25  0426 06/03/25  0448 06/02/25  0523 06/01/25  0525 05/31/25  0508 05/30/25  0533 05/29/25  0459   AST U/L 44* 49* 45* 37 35 35 33   ALT U/L 30 31 28 24 23 19 22   TOTAL BILIRUBIN mg/dL 8.85* 8.47* 8.63* 9.11* 10.99* 7.84* 7.91*     Results from last 7 days   Lab Units 06/04/25  0426 06/02/25  0523 05/30/25  0533   INR  1.16 1.09 1.07     Other specified anemias  No evidence of blood loss.  Transfused 1 unit PRBC 5/28, and again 5/30  EGD and colonoscopy February with dieulafoy lesion but no other sources of bleeding  Evaluated by GI: no current endoscopy recommended  Monitor hemoglobin    Results from last 7 days   Lab Units 06/04/25  0426 06/02/25  0523 06/01/25  0525 05/31/25  0508 05/30/25  0533 05/29/25  0459   HEMOGLOBIN g/dL 7.6* 8.3* 8.2* 8.9* 6.7* 7.2*     Primary hypertension  Initially presented with markedly elevated blood pressure secondary to noncompliance  Continue amlodipine 5 mg twice daily, hydralazine 25 mg 3 times daily and new Coreg 6.25 twice daily  Blood pressure adequately controlled  Type 2 diabetes mellitus with hyperglycemia, with long-term current use of insulin (HCC)  Lab Results   Component Value Date    HGBA1C 9.6 (H) 05/26/2025     Recent Labs     06/03/25  1115 06/03/25  1612 06/03/25 2035 06/04/25  0614   POCGLU 183* 272* 205* 205*     HHNK upon arrival likely related to noncompliance with medications and alcohol  use  Restarted on 70/30 at 5 units twice daily   Continue to monitor and titrate as needed  Alcoholic hepatitis with ascites  History of alcohol use with withdrawal and withdrawal seizures  Father reported active consumption of alcohol  Initially noted to have altered mental status, treated in the ICU with phenobarbital for possible alcohol withdrawal  Continue thiamine and folic acid  Case management following for rehab referrals  History of atrial fibrillation  History of atrial fibrillation on metoprolol  Not on anticoagulation at baseline  Hyponatremia  Patient with hyponatremia  Reviewed previous records: Appears to have chronic, intermittent hyponatremia, suspect secondary to free water excess in the setting of alcohol abuse  Appreciate nephrology evaluation and recommendations  Chronic pancreatitis (HCC)  Patient with a history of chronic pancreatitis secondary to alcohol  Initially presented with abdominal pain nausea/vomiting, since improved  MRCP with evidence of chronic pancreatitis and chronic obstructing pancreatic duct stone  Continue diet, and complete alcohol cessation  Continue proton pump inhibitor  Chronic bilateral low back pain with bilateral sciatica  Patient complains of low back pain across his bilateral lower back, radiating down both legs to his toes.  Denies any numbness.  States it has been present for many months  Patient had previous workups with x-rays of lumbar spine  X-ray lumbar spine: No acute abnormality  Continue ambulation  Continue Tylenol/Lidoderm/Voltaren/heating  Pain since resolved    VTE Pharmacologic Prophylaxis: VTE Score: 3 Moderate Risk (Score 3-4) - Pharmacological DVT Prophylaxis Ordered: heparin.    Mobility:   Basic Mobility Inpatient Raw Score: 23  JH-HLM Goal: 7: Walk 25 feet or more  JH-HLM Achieved: 7: Walk 25 feet or more  JH-HLM Goal achieved. Continue to encourage appropriate mobility.    Patient Centered Rounds: I performed bedside rounds with nursing staff  today.   Discussions with Specialists or Other Care Team Provider: GI: Dr. Esparza    Education and Discussions with Family / Patient: The patient in Samoan at the bedside.    Current Length of Stay: 12 day(s)  Current Patient Status: Inpatient   Certification Statement: The patient will continue to require additional inpatient hospital stay due to acute kidney injury  Discharge Plan: Anticipate discharge in 48 hrs to alcohol rehab    Code Status: Level 1 - Full Code    Subjective   Patient is examined and interviewed by me in Samoan at the bedside.  Currently denies any pain anywhere at all.  Specifically denies any abdominal pain.  Denies any nausea or vomiting.  Is tolerating p.o.  Denies any diarrhea or constipation.  Denies any chest pain.  Denies any shortness of breath or cough.  Denies any other complaint    Objective :  Temp:  [98 °F (36.7 °C)-99.3 °F (37.4 °C)] 99.3 °F (37.4 °C)  HR:  [73-80] 78  BP: (128-160)/(64-74) 158/70  Resp:  [12-18] 18  SpO2:  [95 %-98 %] 95 %  O2 Device: None (Room air)    Body mass index is 19.83 kg/m².     Input and Output Summary (last 24 hours):     Intake/Output Summary (Last 24 hours) at 6/4/2025 1026  Last data filed at 6/4/2025 0506  Gross per 24 hour   Intake 1010 ml   Output 2420 ml   Net -1410 ml       Physical Exam  General: Very pleasant male.  No acute distress.  Nontachypneic and not dyspneic  Heart: Regular rate and rhythm.  S1-S2 present.  No murmur, rub, gallop  Lungs: Clear to auscultation bilaterally.  Good air movement.  No wheezes, crackles, rhonchi.  No accessory muscle use or respiratory distress  Abdo: Soft, nontender with palpation.  Nondistended.  Normal active bowel sounds present.  No guarding or rebound.  No peritoneal signs or mass  Extremities: No clubbing, cyanosis.  Trace pretibial and trace bilateral pedal edema.  2+ pedal pulses  Neurologic: Awake.  Alert.  Interactive.    Lines/Drains:              Lab Results: I have reviewed the following  results:   Results from last 7 days   Lab Units 06/04/25  0426   WBC Thousand/uL 9.72   HEMOGLOBIN g/dL 7.6*   HEMATOCRIT % 21.6*   PLATELETS Thousands/uL 153   SEGS PCT % 64   LYMPHO PCT % 17   MONO PCT % 15*   EOS PCT % 3     Results from last 7 days   Lab Units 06/04/25  0426   SODIUM mmol/L 134*   POTASSIUM mmol/L 4.9   CHLORIDE mmol/L 107   CO2 mmol/L 17*   BUN mg/dL 36*   CREATININE mg/dL 2.91*   ANION GAP mmol/L 10   CALCIUM mg/dL 9.1   ALBUMIN g/dL 4.1   TOTAL BILIRUBIN mg/dL 8.85*   ALK PHOS U/L 385*   ALT U/L 30   AST U/L 44*   GLUCOSE RANDOM mg/dL 189*     Results from last 7 days   Lab Units 06/04/25  0426   INR  1.16     Results from last 7 days   Lab Units 06/04/25  0614 06/03/25  2035 06/03/25  1612 06/03/25  1115 06/03/25  0619 06/02/25  2048 06/02/25  2022 06/02/25  1638 06/02/25  1111 06/02/25  0740 06/01/25  2100 06/01/25  1605   POC GLUCOSE mg/dl 205* 205* 272* 183* 156* 83 77 192* 192* 195* 143* 180*               Recent Cultures (last 7 days):   Results from last 7 days   Lab Units 06/03/25  1011   GRAM STAIN RESULT  No Polys or Bacteria seen       ===================================================  Imaging     6/3 MRCP  Significant image quality degradation secondary to motion artifact.  Redemonstrated findings of chronic pancreatitis with stable main pancreatic ductal dilatation secondary to obstructing pancreatic head calculi. No acute peripancreatic inflammatory changes to suggest acute pancreatitis. No focal intrapancreatic or   peripancreatic fluid collection.  Small bilateral pleural effusions    6/1 US kidney/bladder  No hydronephrosis.  Increased echogenicity of the renal parenchyma bilaterally suggestive of medical renal disease.  At least moderate abdominal and pelvic ascites.     6/1 Xray Lumbar spine  No acute osseous abnormality      5/26 chest x-rayLeft lower lobe atelectasis versus infiltrate      5/23 right upper quadrant ultrasound  Heterogeneous appearance of the pancreas,  correlates with fatty replacement and calcifications seen on prior CT, possibly sequela of chronic inflammation/chronic pancreatitis.  Liver appears grossly unremarkable.  Small volume hypoechoic ascites, and other findings as above.     5/23 CT head without contrast  No acute intracranial abnormality.      Microbiology   6/3: Body fluid culture: Pending  5/26 blood culture: Negative x 2        Procedure  6/3: IR paracentesis: 1700 cc fluid           ==================================================    Last 24 Hours Medication List:     Current Facility-Administered Medications:     acetaminophen (TYLENOL) tablet 325 mg, Q6H PRN    amLODIPine (NORVASC) tablet 5 mg, BID    carvedilol (COREG) tablet 6.25 mg, BID With Meals    Diclofenac Sodium (VOLTAREN) 1 % topical gel 2 g, 4x Daily    folic acid (FOLVITE) tablet 1 mg, Daily    heparin (porcine) subcutaneous injection 5,000 Units, Q8H JAISON    hydrALAZINE (APRESOLINE) injection 10 mg, Q4H PRN    hydrALAZINE (APRESOLINE) tablet 25 mg, Q8H JAISON    insulin aspart protamine-insulin aspart (NovoLOG 70/30) 100 units/mL subcutaneous injection 5 Units, BID AC    insulin lispro (HumALOG/ADMELOG) 100 units/mL subcutaneous injection 1-5 Units, TID AC **AND** Fingerstick Glucose (POCT), TID AC    insulin lispro (HumALOG/ADMELOG) 100 units/mL subcutaneous injection 1-5 Units, HS    lidocaine (LIDODERM) 5 % patch 1 patch, Daily    ondansetron (ZOFRAN) injection 4 mg, Q4H PRN    thiamine tablet 100 mg, Daily    Administrative Statements   Today, Patient Was Seen By: Mojgan Young MD      **Please Note: This note may have been constructed using a voice recognition system.**

## 2025-06-04 NOTE — PLAN OF CARE
Problem: GASTROINTESTINAL - ADULT  Goal: Minimal or absence of nausea and/or vomiting  Description: INTERVENTIONS:  - Administer IV fluids if ordered to ensure adequate hydration  - Maintain NPO status until nausea and vomiting are resolved  - Nasogastric tube if ordered  - Administer ordered antiemetic medications as needed  - Provide nonpharmacologic comfort measures as appropriate  - Advance diet as tolerated, if ordered  - Consider nutrition services referral to assist patient with adequate nutrition and appropriate food choices  Outcome: Progressing  Goal: Maintains adequate nutritional intake  Description: INTERVENTIONS:  - Monitor percentage of each meal consumed  - Identify factors contributing to decreased intake, treat as appropriate  - Assist with meals as needed  - Monitor I&O, weight, and lab values if indicated  - Obtain nutrition services referral as needed  Outcome: Progressing     Problem: METABOLIC, FLUID AND ELECTROLYTES - ADULT  Goal: Electrolytes maintained within normal limits  Description: INTERVENTIONS:  - Monitor labs and assess patient for signs and symptoms of electrolyte imbalances  - Administer electrolyte replacement as ordered  - Monitor response to electrolyte replacements, including repeat lab results as appropriate  - Instruct patient on fluid and nutrition as appropriate  Outcome: Progressing  Goal: Fluid balance maintained  Description: INTERVENTIONS:  - Monitor labs   - Monitor I/O and WT  - Instruct patient on fluid and nutrition as appropriate  - Assess for signs & symptoms of volume excess or deficit  Outcome: Progressing  Goal: Glucose maintained within target range  Description: INTERVENTIONS:  - Monitor Blood Glucose as ordered  - Assess for signs and symptoms of hyperglycemia and hypoglycemia  - Administer ordered medications to maintain glucose within target range  - Assess nutritional intake and initiate nutrition service referral as needed  Outcome: Progressing

## 2025-06-04 NOTE — ASSESSMENT & PLAN NOTE
Management as per gastroenterology.  Consideration for possible liver biopsy.  Post paracentesis  MRI of the abdomen pending

## 2025-06-05 LAB
ALBUMIN SERPL BCG-MCNC: 3.7 G/DL (ref 3.5–5)
ALP SERPL-CCNC: 404 U/L (ref 34–104)
ALT SERPL W P-5'-P-CCNC: 34 U/L (ref 7–52)
ANION GAP SERPL CALCULATED.3IONS-SCNC: 8 MMOL/L (ref 4–13)
AST SERPL W P-5'-P-CCNC: 56 U/L (ref 13–39)
BASE EX.OXY STD BLDV CALC-SCNC: 95.9 % (ref 60–80)
BASE EXCESS BLDV CALC-SCNC: -7.4 MMOL/L
BASOPHILS # BLD AUTO: 0.03 THOUSANDS/ÂΜL (ref 0–0.1)
BASOPHILS NFR BLD AUTO: 0 % (ref 0–1)
BILIRUB DIRECT SERPL-MCNC: 5.62 MG/DL (ref 0–0.2)
BILIRUB SERPL-MCNC: 9.21 MG/DL (ref 0.2–1)
BUN SERPL-MCNC: 37 MG/DL (ref 5–25)
CALCIUM SERPL-MCNC: 8.8 MG/DL (ref 8.4–10.2)
CHLORIDE SERPL-SCNC: 110 MMOL/L (ref 96–108)
CO2 SERPL-SCNC: 16 MMOL/L (ref 21–32)
CREAT SERPL-MCNC: 2.89 MG/DL (ref 0.6–1.3)
EOSINOPHIL # BLD AUTO: 0.38 THOUSAND/ÂΜL (ref 0–0.61)
EOSINOPHIL NFR BLD AUTO: 4 % (ref 0–6)
ERYTHROCYTE [DISTWIDTH] IN BLOOD BY AUTOMATED COUNT: 16.5 % (ref 11.6–15.1)
GFR SERPL CREATININE-BSD FRML MDRD: 23 ML/MIN/1.73SQ M
GLUCOSE SERPL-MCNC: 105 MG/DL (ref 65–140)
GLUCOSE SERPL-MCNC: 121 MG/DL (ref 65–140)
GLUCOSE SERPL-MCNC: 188 MG/DL (ref 65–140)
GLUCOSE SERPL-MCNC: 220 MG/DL (ref 65–140)
GLUCOSE SERPL-MCNC: 220 MG/DL (ref 65–140)
HCO3 BLDV-SCNC: 16.2 MMOL/L (ref 24–30)
HCT VFR BLD AUTO: 23 % (ref 36.5–49.3)
HGB BLD-MCNC: 7.8 G/DL (ref 12–17)
IMM GRANULOCYTES # BLD AUTO: 0.11 THOUSAND/UL (ref 0–0.2)
IMM GRANULOCYTES NFR BLD AUTO: 1 % (ref 0–2)
LYMPHOCYTES # BLD AUTO: 1.99 THOUSANDS/ÂΜL (ref 0.6–4.47)
LYMPHOCYTES NFR BLD AUTO: 21 % (ref 14–44)
MCH RBC QN AUTO: 32.2 PG (ref 26.8–34.3)
MCHC RBC AUTO-ENTMCNC: 33.9 G/DL (ref 31.4–37.4)
MCV RBC AUTO: 95 FL (ref 82–98)
MONOCYTES # BLD AUTO: 1.07 THOUSAND/ÂΜL (ref 0.17–1.22)
MONOCYTES NFR BLD AUTO: 11 % (ref 4–12)
NEUTROPHILS # BLD AUTO: 5.95 THOUSANDS/ÂΜL (ref 1.85–7.62)
NEUTS SEG NFR BLD AUTO: 63 % (ref 43–75)
NRBC BLD AUTO-RTO: 0 /100 WBCS
O2 CT BLDV-SCNC: 12.2 ML/DL
PCO2 BLDV: 26.3 MM HG (ref 42–50)
PH BLDV: 7.41 [PH] (ref 7.3–7.4)
PLATELET # BLD AUTO: 155 THOUSANDS/UL (ref 149–390)
PMV BLD AUTO: 12.5 FL (ref 8.9–12.7)
PO2 BLDV: 107.8 MM HG (ref 35–45)
POTASSIUM SERPL-SCNC: 5 MMOL/L (ref 3.5–5.3)
PROT SERPL-MCNC: 5.7 G/DL (ref 6.4–8.4)
RBC # BLD AUTO: 2.42 MILLION/UL (ref 3.88–5.62)
SODIUM SERPL-SCNC: 134 MMOL/L (ref 135–147)
WBC # BLD AUTO: 9.53 THOUSAND/UL (ref 4.31–10.16)

## 2025-06-05 PROCEDURE — 82948 REAGENT STRIP/BLOOD GLUCOSE: CPT

## 2025-06-05 PROCEDURE — 80048 BASIC METABOLIC PNL TOTAL CA: CPT | Performed by: INTERNAL MEDICINE

## 2025-06-05 PROCEDURE — 97530 THERAPEUTIC ACTIVITIES: CPT

## 2025-06-05 PROCEDURE — 80076 HEPATIC FUNCTION PANEL: CPT | Performed by: INTERNAL MEDICINE

## 2025-06-05 PROCEDURE — 82805 BLOOD GASES W/O2 SATURATION: CPT | Performed by: INTERNAL MEDICINE

## 2025-06-05 PROCEDURE — 99232 SBSQ HOSP IP/OBS MODERATE 35: CPT | Performed by: INTERNAL MEDICINE

## 2025-06-05 PROCEDURE — 97116 GAIT TRAINING THERAPY: CPT

## 2025-06-05 PROCEDURE — 85025 COMPLETE CBC W/AUTO DIFF WBC: CPT | Performed by: INTERNAL MEDICINE

## 2025-06-05 RX ORDER — SODIUM BICARBONATE 650 MG/1
1300 TABLET ORAL
Status: DISCONTINUED | OUTPATIENT
Start: 2025-06-05 | End: 2025-06-06 | Stop reason: HOSPADM

## 2025-06-05 RX ORDER — INSULIN ASPART 100 [IU]/ML
8 INJECTION, SUSPENSION SUBCUTANEOUS
Status: DISCONTINUED | OUTPATIENT
Start: 2025-06-06 | End: 2025-06-06 | Stop reason: HOSPADM

## 2025-06-05 RX ADMIN — SODIUM BICARBONATE 650 MG TABLET 1300 MG: at 16:41

## 2025-06-05 RX ADMIN — HYDRALAZINE HYDROCHLORIDE 25 MG: 25 TABLET ORAL at 06:16

## 2025-06-05 RX ADMIN — CARVEDILOL 6.25 MG: 6.25 TABLET, FILM COATED ORAL at 16:41

## 2025-06-05 RX ADMIN — HYDRALAZINE HYDROCHLORIDE 25 MG: 25 TABLET ORAL at 22:02

## 2025-06-05 RX ADMIN — HEPARIN SODIUM 5000 UNITS: 5000 INJECTION INTRAVENOUS; SUBCUTANEOUS at 06:16

## 2025-06-05 RX ADMIN — AMLODIPINE BESYLATE 5 MG: 5 TABLET ORAL at 07:33

## 2025-06-05 RX ADMIN — DICLOFENAC SODIUM 2 G: 10 GEL TOPICAL at 07:34

## 2025-06-05 RX ADMIN — Medication 100 MG: at 07:33

## 2025-06-05 RX ADMIN — CARVEDILOL 6.25 MG: 6.25 TABLET, FILM COATED ORAL at 07:33

## 2025-06-05 RX ADMIN — AMLODIPINE BESYLATE 5 MG: 5 TABLET ORAL at 16:41

## 2025-06-05 RX ADMIN — INSULIN LISPRO 1 UNITS: 100 INJECTION, SOLUTION INTRAVENOUS; SUBCUTANEOUS at 07:35

## 2025-06-05 RX ADMIN — LIDOCAINE 1 PATCH: 50 PATCH CUTANEOUS at 07:33

## 2025-06-05 RX ADMIN — INSULIN ASPART 5 UNITS: 100 INJECTION, SUSPENSION SUBCUTANEOUS at 07:35

## 2025-06-05 RX ADMIN — HEPARIN SODIUM 5000 UNITS: 5000 INJECTION INTRAVENOUS; SUBCUTANEOUS at 22:02

## 2025-06-05 RX ADMIN — SODIUM BICARBONATE 650 MG TABLET 1300 MG: at 08:59

## 2025-06-05 RX ADMIN — SODIUM BICARBONATE 650 MG TABLET 1300 MG: at 11:30

## 2025-06-05 RX ADMIN — INSULIN LISPRO 1 UNITS: 100 INJECTION, SOLUTION INTRAVENOUS; SUBCUTANEOUS at 11:29

## 2025-06-05 RX ADMIN — HEPARIN SODIUM 5000 UNITS: 5000 INJECTION INTRAVENOUS; SUBCUTANEOUS at 13:29

## 2025-06-05 RX ADMIN — FOLIC ACID 1 MG: 1 TABLET ORAL at 07:33

## 2025-06-05 RX ADMIN — INSULIN LISPRO 1 UNITS: 100 INJECTION, SOLUTION INTRAVENOUS; SUBCUTANEOUS at 16:41

## 2025-06-05 RX ADMIN — INSULIN ASPART 5 UNITS: 100 INJECTION, SUSPENSION SUBCUTANEOUS at 16:41

## 2025-06-05 NOTE — ASSESSMENT & PLAN NOTE
Resume oral sodium bicarbonate therapy  Although likely component respiratory alkalosis recent pH 7.4/26/16.2   show

## 2025-06-05 NOTE — ASSESSMENT & PLAN NOTE
History of atrial fibrillation hypertension alcohol liver disease who presented to the hospital for change in mental status found to have hyperglycemia    Admitted to ICU and stabilized  Altered mental status attributed to toxic metabolic encephalopathy secondary to hyperglycemia with HHNK, acute kidney injury, and acute liver failure  Clinically improved back to baseline   well developed, well nourished , in no acute distress , ambulating without difficulty , normal communication ability

## 2025-06-05 NOTE — PLAN OF CARE
Problem: PHYSICAL THERAPY ADULT  Goal: Performs mobility at highest level of function for planned discharge setting.  See evaluation for individualized goals.  Description: Treatment/Interventions: Functional transfer training, LE strengthening/ROM, Elevations, Therapeutic exercise, Endurance training, Patient/family training, Bed mobility, Gait training, Spoke to nursing, OT  Equipment Recommended: Walker       See flowsheet documentation for full assessment, interventions and recommendations.  Outcome: Progressing  Note: Prognosis: Good  Problem List: Decreased mobility  Assessment: Pt. progressing well with overall mobility and needed no hands on A for any mobility. Pt. noted with no LOB t/o session. Pt. had 4x brief standing rests between ambulation due to fatigue. Talked to Michele PT regardubng patient;s progress and to DC pt. from PT caseload and agreeable to it. Pt. was in toilet at the end of the session and RN okayed to leave pt. in the toilet. DC pt. from caseload as pt. has achieved all PT goals set in IE and no further skilled therapy services needed at this time.  Barriers to Discharge: None  Barriers to Discharge Comments: home alone; (+)stairs  Rehab Resource Intensity Level, PT: No post-acute rehabilitation needs    See flowsheet documentation for full assessment.

## 2025-06-05 NOTE — ASSESSMENT & PLAN NOTE
Lab Results   Component Value Date    HGBA1C 9.6 (H) 05/26/2025     Recent Labs     06/04/25 2013 06/05/25  0611 06/05/25  1123 06/05/25  1616   POCGLU 133 188* 220* 220*     HHNK upon arrival likely related to noncompliance with medications and alcohol use  Restarted on 70/30  Increased to 8 units twice daily  Continue to monitor and titrate as needed

## 2025-06-05 NOTE — CASE MANAGEMENT
Case Management Discharge Planning Note    Patient name Wes Araujo  [facility] M* MRN [id number]  : 1970 Date 2025       Current Admission Date: 2025  Current Admission Diagnosis:Toxic metabolic encephalopathy   Patient Active Problem List    Diagnosis Date Noted    Chronic bilateral low back pain with bilateral sciatica 2025    Hyperkalemia 2025    Acute kidney injury (HCC) 2025    CKD stage 3b, GFR 30-44 ml/min (HCC) 2025    Other specified anemias 2025    Elevated LFTs 2025    Salvadorean  needed 2025    Electrolyte abnormality 2025    Abnormal LFTs 2025    Abdominal pain 2025    CKD stage 3a, GFR 45-59 ml/min (HCC) 2025    Alcohol-induced chronic pancreatitis (HCC) 2024    Toxic metabolic encephalopathy 2024    Seizure (HCC) 2022    Bilateral hearing loss 2020    Acute renal failure superimposed on stage 3 chronic kidney disease (HCC) 2019    Anemia 2019    Hyponatremia 2019    History of atrial fibrillation 2019    Metabolic acidosis 2019    ZENAIDA (acute kidney injury) (HCC) 2019    Primary hypertension     Type 2 diabetes mellitus with hyperglycemia, with long-term current use of insulin (HCC)     Alcoholic hepatitis with ascites     Paroxysmal A-fib (HCC)     Chronic pancreatitis (HCC)       LOS (days): 13  Geometric Mean LOS (GMLOS) (days):   Days to GMLOS:     OBJECTIVE:  Risk of Unplanned Readmission Score: 34.66         Current admission status: Inpatient   Preferred Pharmacy:    [facility] - [address] [place], [place] [address]  Phone: [phone] Fax: [phone]    [facility] - [facility]  [id number]  [facility],  [address] [place] [address],  First Floor [facility] [address]  Phone: [phone] Fax: [phone]    [facility]/pharmacy #[id number] - [facility] [facility] - [id number]  [facility]  [address] [facility] [address]  Phone: [phone] Fax: [phone]    Primary Care Provider: Rush Kebede MD    Primary Insurance: [employer]  Secondary Insurance:     DISCHARGE DETAILS:                                                                                                 Additional Comments: CM reached out to [facility] to get an update on referral. HOST reported referral was closed as pt declined. CM met pt at bedside using  ([id number] [employer]) to ask if he was still interested in [facility] and pt does still want to. CM reached out to [facility] and initiated referral again. Pt is medically cleared and awaiting placement from [facility]. CM will continue to provide support until d/c.  Update 1700: CM reached out to [facility] to get an update. HOST reports pt refused services so they closed referral. MD made aware.

## 2025-06-05 NOTE — PROGRESS NOTES
NEPHROLOGY HOSPITAL PROGRESS NOTE   Wes Araujo 55 y.o. male MRN: [id number]  Unit/Bed#: [place] [address] -01 Encounter: [id number]  Reason for Consult: ZENAIDA    Assessment & Plan  Acute kidney injury (HCC)  ZENAIDA most likely secondary to prerenal azotemia secondary to osmotic diuresis plus some component of intravascular volume depletion in light of hypoalbuminemia with subsequent ATN versus HRS in the presence of acute liver injury/hyperbilirubinemia  After review of records it appears that the patient has a baseline Creatinine of 1.5-2.0 mg/dL.  Admission creatinine of 1.99 mg/dL on 5/23.  Status post albumin with minimal improvement.  Urine sodium 46 (not consistent with HRS), urine microscopy 10-20 RBCs, 4-10 white cells and 10-25 hyaline cast.  CKD stage 3b, GFR 30-44 ml/min (HCC)  After review of records it appears that the patient has a baseline Creatinine of 1.5-2.0 mg/dL  Likely has underlying CKD secondary to poorly controlled diabetes plus hypertensive nephrosclerosis plus age-related nephron loss  Hyponatremia  Stable at 134  Primary hypertension    Home medications: Hydralazine 25 mg p.o. every 8, Norvasc 5 mg p.o. twice daily  Current medications: coreg 6.25 mg BID, Norvasc 5 mg p.o. twice daily, hydralazine 25 mg p.o. every 8h  Type 2 diabetes mellitus with hyperglycemia, with long-term current use of insulin (HCC)  Lab Results   Component Value Date    HGBA1C 9.6 (H) 05/26/2025   On insulin  Recommend tight glycemic control  Noted diabetic kidney disease with prior albumin creatinine ratio 1699  Alcoholic hepatitis with ascites  Monitor for withdrawal  Alcohol rehab on discharge  History of atrial fibrillation  Follow-up with cardiology  Abnormal LFTs  Management as per gastroenterology.  Consideration for possible liver biopsy.  Post paracentesis  MRI: Chronic pancreatitis with small bilateral effusions.  Other specified anemias  Transfuse as needed, s/p PRBC already this admission  Hyperkalemia  Stable  currently   Metabolic acidosis  Resume oral sodium bicarbonate therapy  Although likely component respiratory alkalosis recent pH 7.4/26/16.2  Chronic bilateral low back pain with bilateral sciatica      Summary:  Renal function overall stable with a creatinine 2.8, unfortunate given pre-existing kidney disease (diabetes) may represent new baseline  Given persistent acidosis recommend resuming sodium bicarbonate therapy  Blood pressure acceptable  No changes from nephrology standpoint    SUBJECTIVE / 24H INTERVAL HISTORY:  Seen and examined.  Patient awake and alert.   utilized.  Complains of mild shortness of breath.    OBJECTIVE:  Current Weight: Weight - Scale: 60.9 kg (134 lb 4.2 oz)  Vitals:    06/04/25 2220 06/05/25 0530 06/05/25 0613 06/05/25 0757   BP: 145/66  145/67 142/68   BP Location:    Left arm   Pulse: 73  71 69   Resp:    18   Temp:   98.1 °F (36.7 °C) 98.3 °F (36.8 °C)   TempSrc:    Oral   SpO2: 97%  98% 97%   Weight:  60.9 kg (134 lb 4.2 oz)     Height:           Intake/Output Summary (Last 24 hours) at 6/5/2025 0945  Last data filed at 6/5/2025 0537  Gross per 24 hour   Intake 960 ml   Output 200 ml   Net 760 ml       Physical Exam  Constitutional:       Appearance: He is not ill-appearing.     Eyes:      General: No scleral icterus.      Cardiovascular:      Rate and Rhythm: Normal rate and regular rhythm.   Pulmonary:      Effort: Pulmonary effort is normal.      Breath sounds: Normal breath sounds.   Abdominal:      General: There is no distension.      Palpations: Abdomen is soft.     Musculoskeletal:      Right lower leg: No edema.      Left lower leg: No edema.     Skin:     General: Skin is warm and dry.      Findings: No rash.     Neurological:      Mental Status: He is alert and oriented to person, place, and time.         Medications:  Current Medications[1]    Laboratory Results:  Results from last 7 days   Lab Units 06/05/25  0502 06/04/25  0426 06/03/25  0448 06/02/25  0523  "06/01/25  0525 05/31/25  0508 05/30/25  0533   WBC Thousand/uL 9.53 9.72  --  9.14 9.92 11.51* 9.68   HEMOGLOBIN g/dL 7.8* 7.6*  --  8.3* 8.2* 8.9* 6.7*   HEMATOCRIT % 23.0* 21.6*  --  24.6* 23.7* 25.8* 19.7*   PLATELETS Thousands/uL 155 153  --  182 195 220 208   POTASSIUM mmol/L 5.0 4.9 5.0 4.9 5.0 4.8 5.3   CHLORIDE mmol/L 110* 107 110* 109* 108 108 108   CO2 mmol/L 16* 17* 15* 16* 17* 20* 16*   BUN mg/dL 37* 36* 39* 37* 36* 30* 24   CREATININE mg/dL 2.89* 2.91* 2.94* 2.76* 2.68* 2.67* 2.55*   CALCIUM mg/dL 8.8 9.1 9.0 8.9 8.8 9.1 8.5   MAGNESIUM mg/dL  --   --   --   --  1.9  --   --    PHOSPHORUS mg/dL  --   --   --   --  3.3 3.0  --        Portions of the record may have been created with voice recognition software. Occasional wrong word or \"sound a like\" substitutions may have occurred due to the inherent limitations of voice recognition software. Read the chart carefully and recognize, using context, where substitutions have occurred.If you have any questions, please contact the dictating provider.       [1]   Current Facility-Administered Medications:     acetaminophen (TYLENOL) tablet 325 mg, 325 mg, Oral, Q6H PRN, ARIANE Moore, 325 mg at 06/01/25 1029    amLODIPine (NORVASC) tablet 5 mg, 5 mg, Oral, BID, Bibi Solis MD, 5 mg at 06/05/25 0733    carvedilol (COREG) tablet 6.25 mg, 6.25 mg, Oral, BID With Meals, Sonja Siegel PA-C, 6.25 mg at 06/05/25 0733    Diclofenac Sodium (VOLTAREN) 1 % topical gel 2 g, 2 g, Topical, 4x Daily, ARIANE Moore, 2 g at 06/05/25 0734    folic acid (FOLVITE) tablet 1 mg, 1 mg, Oral, Daily, Bala Leroy DO, 1 mg at 06/05/25 0733    heparin (porcine) subcutaneous injection 5,000 Units, 5,000 Units, Subcutaneous, Q8H JAISON, Mojgan Young MD, 5,000 Units at 06/05/25 0616    hydrALAZINE (APRESOLINE) injection 10 mg, 10 mg, Intravenous, Q4H PRN, ARIANE Leggett, 10 mg at 05/25/25 0318    hydrALAZINE (APRESOLINE) tablet 25 mg, 25 mg, Oral, Q8H JAISON, Sonja" TOI Siegel, 25 mg at 06/05/25 0616    insulin aspart protamine-insulin aspart (NovoLOG 70/30) 100 units/mL subcutaneous injection 5 Units, 5 Units, Subcutaneous, BID AC, ARIANE Leggett, 5 Units at 06/05/25 0735    insulin lispro (HumALOG/ADMELOG) 100 units/mL subcutaneous injection 1-5 Units, 1-5 Units, Subcutaneous, TID AC, 1 Units at 06/05/25 0735 **AND** Fingerstick Glucose (POCT), , , TID AC, ARIANE Leggett    insulin lispro (HumALOG/ADMELOG) 100 units/mL subcutaneous injection 1-5 Units, 1-5 Units, Subcutaneous, HS, ARIANE Leggett, 1 Units at 06/03/25 2123    lidocaine (LIDODERM) 5 % patch 1 patch, 1 patch, Topical, Daily, ARIANE Moore, 1 patch at 06/05/25 0733    ondansetron (ZOFRAN) injection 4 mg, 4 mg, Intravenous, Q4H PRN, Bala Leroy DO    sodium bicarbonate tablet 1,300 mg, 1,300 mg, Oral, TID after meals, Vishnu Messer DO, 1,300 mg at 06/05/25 0859    thiamine tablet 100 mg, 100 mg, Oral, Daily, Bala Leroy DO, 100 mg at 06/05/25 0733

## 2025-06-05 NOTE — PROGRESS NOTES
Progress Note - Hospitalist   Name: Wes Araujo 55 y.o. male I MRN: [id number]  Unit/Bed#: [place] [address] -01 I Date of Admission: 5/23/2025   Date of Service: 6/5/2025 I Hospital Day: 13    Assessment & Plan  Toxic metabolic encephalopathy  History of atrial fibrillation hypertension alcohol liver disease who presented to the hospital for change in mental status found to have hyperglycemia    Admitted to ICU and stabilized  Altered mental status attributed to toxic metabolic encephalopathy secondary to hyperglycemia with HHNK, acute kidney injury, and acute liver failure  Clinically improved back to baseline  Acute renal failure superimposed on stage 3 chronic kidney disease (HCC)  Patient presented with acute kidney injury with a creatinine of 1.99  History of CKD 3: baseline creatinine approximately 1.5-2.0  Appreciate nephrology evaluation and recommendations: Secondary to prerenal azotemia, diuresis, subsequent ATN  in the setting of acute liver failure  Creatinine worsened to 2.9  Status post IV fluids  Renal ultrasound: No hydronephrosis.  Increased echogenicity consistent with medical renal disease  Patient started on IV albumin and sodium bicarbonate infusion by nephrology  Urine sodium not consistent with hepatorenal syndrome  Creatinine appears to have plateaued 2.8-2.9, suspect this may be his new baseline  Per nephrology: Will discharge on bicarbonate supplements  Outpatient follow-up with nephrology      Results from last 7 days   Lab Units 06/05/25  0502 06/04/25  0426 06/03/25  0448 06/02/25  0523 06/01/25  0525 05/31/25  0508 05/30/25  0533   BUN mg/dL 37* 36* 39* 37* 36* 30* 24   CREATININE mg/dL 2.89* 2.91* 2.94* 2.76* 2.68* 2.67* 2.55*   EGFR ml/min/1.73sq m 23 23 22 24 25 25 27     Abnormal LFTs  Evidence of worsening hyperbilirubinemia, elevated alkaline phosphatase and coagulopathy on admission  No significant transaminitis: Initially mild elevated AST  evaluated by gastroenterology.    Noted  to have chronically elevated liver enzymes: Acute rise this admission was attributed to acute alcoholic hepatitis  Discriminant function did not meet criteria for steroids  Per GI: No stigmata of chronic liver disease on exam  Outpatient elastography versus liver biopsy for definitive diagnosis  Serologic workup negative for etiology of liver injury  Initial coagulopathy resolved with vitamin K  MRCP: Obstructing pancreatic head calculi.  Seen previously.  Stable main pancreatic ductal dilatation.  No intrahepatic biliary ductal dilatation.  Ruled out infection: Cultures no growth  Renal ultrasound: Moderate ascites  IR paracentesis 6/3: 1.7 L clear fluid  SAAG =2.5 c/w portal HTN  Spoke with GI: Dr. Esparza: No additional inpatient workup required.  He notes they have referred patient to outpatient hepatology clinic    Results from last 7 days   Lab Units 06/05/25  0502 06/04/25  0426 06/03/25  0448 06/02/25  0523 06/01/25  0525 05/31/25  0508 05/30/25  0533   AST U/L 56* 44* 49* 45* 37 35 35   ALT U/L 34 30 31 28 24 23 19   TOTAL BILIRUBIN mg/dL 9.21* 8.85* 8.47* 8.63* 9.11* 10.99* 7.84*     Results from last 7 days   Lab Units 06/04/25  0426 06/02/25  0523 05/30/25  0533   INR  1.16 1.09 1.07     Other specified anemias  No evidence of blood loss.  Transfused 1 unit PRBC 5/28, and again 5/30  EGD and colonoscopy February with dieulafoy lesion but no other sources of bleeding  Evaluated by GI: no current endoscopy recommended  Monitor hemoglobin: Stable ranging 7-8    Results from last 7 days   Lab Units 06/05/25  0502 06/04/25  0426 06/02/25  0523 06/01/25  0525 05/31/25  0508 05/30/25  0533   HEMOGLOBIN g/dL 7.8* 7.6* 8.3* 8.2* 8.9* 6.7*     Primary hypertension  Initially presented with markedly elevated blood pressure secondary to noncompliance  Continue amlodipine 5 mg twice daily, hydralazine 25 mg 3 times daily and new Coreg 6.25 twice daily  Blood pressure adequately controlled  Type 2 diabetes mellitus with  hyperglycemia, with long-term current use of insulin (HCC)  Lab Results   Component Value Date    HGBA1C 9.6 (H) 05/26/2025     Recent Labs     06/04/25  2013 06/05/25  0611 06/05/25  1123 06/05/25  1616   POCGLU 133 188* 220* 220*     HHNK upon arrival likely related to noncompliance with medications and alcohol use  Restarted on 70/30  Increased to 8 units twice daily  Continue to monitor and titrate as needed  Alcoholic hepatitis with ascites  History of alcohol use with withdrawal and withdrawal seizures  Father reported active consumption of alcohol  Initially noted to have altered mental status, treated in the ICU with phenobarbital for possible alcohol withdrawal  Continue thiamine and folic acid  Case management following for rehab referrals  History of atrial fibrillation  History of atrial fibrillation on metoprolol  Not on anticoagulation at baseline  Hyponatremia  Patient with hyponatremia  Reviewed previous records: Appears to have chronic, intermittent hyponatremia, suspect secondary to free water excess in the setting of alcohol abuse  Appreciate nephrology evaluation and recommendations  Chronic pancreatitis (HCC)  Patient with a history of chronic pancreatitis secondary to alcohol  Initially presented with abdominal pain nausea/vomiting, since improved  MRCP with evidence of chronic pancreatitis and chronic obstructing pancreatic duct stone  Continue diet, and complete alcohol cessation  Continue proton pump inhibitor  Chronic bilateral low back pain with bilateral sciatica  Patient complains of low back pain across his bilateral lower back, radiating down both legs to his toes.  Denies any numbness.  States it has been present for many months  Patient had previous workups with x-rays of lumbar spine  X-ray lumbar spine: No acute abnormality  Continue ambulation  Continue Tylenol/Lidoderm/Voltaren/heating  Pain since resolved    VTE Pharmacologic Prophylaxis: VTE Score: 3 Moderate Risk (Score 3-4) -  Pharmacological DVT Prophylaxis Ordered: heparin.    Mobility:   Basic Mobility Inpatient Raw Score: 24  JH-HLM Goal: 8: Walk 250 feet or more  JH-HLM Achieved: 8: Walk 250 feet ot more  JH-HLM Goal achieved. Continue to encourage appropriate mobility.    Patient Centered Rounds: I performed bedside rounds with nursing staff today.   Discussions with Specialists or Other Care Team Provider: Nephrology Dr Messer.  GI Dr Esparza    Education and Discussions with Family / Patient: updated pt in Sami at bedside:      Current Length of Stay: 13 day(s)  Current Patient Status: Inpatient   Certification Statement: The patient will continue to require additional inpatient hospital stay due to discharge planning to inpatient alcohol rehab, host referral  Discharge Plan: Medically ready for discharge to inpatient alcohol rehab when bed available    Code Status: Level 1 - Full Code    Subjective   Patient is examined and interviewed by me in Russian at the bedside.  Denies any pain.  Denies any chest pain.  Denies any abdominal pain.  Denies any nausea, vomiting, diarrhea or constipation.  Is tolerating p.o.  Passing urine without any difficulties.  Denies any dizziness or lightheadedness.  Denies any bleeding.  Notes he feels well    Objective :  Temp:  [97.8 °F (36.6 °C)-98.9 °F (37.2 °C)] 97.8 °F (36.6 °C)  HR:  [65-73] 65  BP: (123-145)/(60-76) 136/76  Resp:  [18] 18  SpO2:  [96 %-98 %] 97 %  O2 Device: None (Room air)    Body mass index is 19.83 kg/m².     Input and Output Summary (last 24 hours):     Intake/Output Summary (Last 24 hours) at 6/5/2025 1732  Last data filed at 6/5/2025 1327  Gross per 24 hour   Intake 1200 ml   Output 200 ml   Net 1000 ml       Physical Exam  General: Very pleasant male.  No acute distress.  Nontachypneic and nondyspneic  Heart: Regular rate and rhythm.  S1-S2 present.  No murmur, rub, gallop  Lungs: Clear to auscultation bilaterally.  No wheezes, crackles, rhonchi.  No accessory muscle use or  respiratory distress  Abdomen: Soft, nontender with palpation.  Nondistended.  Normal active bowel sounds present.  No guarding or rebound.  No peritoneal signs or mass  Extremities: No clubbing, cyanosis.  Trace pretibial and pedal edema.  2+ pedal pulses  Neurologic: Awake and alert.  Oriented.  Interactive.  Moving all 4 extremities  Skin: Warm and dry.  No petechia, purpura, rash    Lines/Drains:      Lab Results: I have reviewed the following results:   Results from last 7 days   Lab Units 06/05/25  0502   WBC Thousand/uL 9.53   HEMOGLOBIN g/dL 7.8*   HEMATOCRIT % 23.0*   PLATELETS Thousands/uL 155   SEGS PCT % 63   LYMPHO PCT % 21   MONO PCT % 11   EOS PCT % 4     Results from last 7 days   Lab Units 06/05/25  0502   SODIUM mmol/L 134*   POTASSIUM mmol/L 5.0   CHLORIDE mmol/L 110*   CO2 mmol/L 16*   BUN mg/dL 37*   CREATININE mg/dL 2.89*   ANION GAP mmol/L 8   CALCIUM mg/dL 8.8   ALBUMIN g/dL 3.7   TOTAL BILIRUBIN mg/dL 9.21*   ALK PHOS U/L 404*   ALT U/L 34   AST U/L 56*   GLUCOSE RANDOM mg/dL 105     Results from last 7 days   Lab Units 06/04/25  0426   INR  1.16     Results from last 7 days   Lab Units 06/05/25  1616 06/05/25  1123 06/05/25  0611 06/04/25  2013 06/04/25  1620 06/04/25  1132 06/04/25  0614 06/03/25  2035 06/03/25  1612 06/03/25  1115 06/03/25  0619 06/02/25  2048   POC GLUCOSE mg/dl 220* 220* 188* 133 209* 146* 205* 205* 272* 183* 156* 83               Recent Cultures (last 7 days):   Results from last 7 days   Lab Units 06/03/25  1011   GRAM STAIN RESULT  No Polys or Bacteria seen   BODY FLUID CULTURE, STERILE  No growth       ===================================================  Imaging     6/3 MRCP  Significant image quality degradation secondary to motion artifact.  Redemonstrated findings of chronic pancreatitis with stable main pancreatic ductal dilatation secondary to obstructing pancreatic head calculi. No acute peripancreatic inflammatory changes to suggest acute pancreatitis. No  focal intrapancreatic or   peripancreatic fluid collection.  Small bilateral pleural effusions     6/1 US kidney/bladder  No hydronephrosis.  Increased echogenicity of the renal parenchyma bilaterally suggestive of medical renal disease.  At least moderate abdominal and pelvic ascites.     6/1 Xray Lumbar spine  No acute osseous abnormality      5/26 chest x-rayLeft lower lobe atelectasis versus infiltrate      5/23 right upper quadrant ultrasound  Heterogeneous appearance of the pancreas, correlates with fatty replacement and calcifications seen on prior CT, possibly sequela of chronic inflammation/chronic pancreatitis.  Liver appears grossly unremarkable.  Small volume hypoechoic ascites, and other findings as above.     5/23 CT head without contrast  No acute intracranial abnormality.      Microbiology   6/3: Body fluid culture: Pending  5/26 blood culture: Negative x 2        Procedure  6/3: IR paracentesis: 1700 cc fluid           ==================================================    Last 24 Hours Medication List:     Current Facility-Administered Medications:     acetaminophen (TYLENOL) tablet 325 mg, Q6H PRN    amLODIPine (NORVASC) tablet 5 mg, BID    carvedilol (COREG) tablet 6.25 mg, BID With Meals    Diclofenac Sodium (VOLTAREN) 1 % topical gel 2 g, 4x Daily    folic acid (FOLVITE) tablet 1 mg, Daily    heparin (porcine) subcutaneous injection 5,000 Units, Q8H JAISON    hydrALAZINE (APRESOLINE) injection 10 mg, Q4H PRN    hydrALAZINE (APRESOLINE) tablet 25 mg, Q8H JAISON    insulin aspart protamine-insulin aspart (NovoLOG 70/30) 100 units/mL subcutaneous injection 5 Units, BID AC    insulin lispro (HumALOG/ADMELOG) 100 units/mL subcutaneous injection 1-5 Units, TID AC **AND** Fingerstick Glucose (POCT), TID AC    insulin lispro (HumALOG/ADMELOG) 100 units/mL subcutaneous injection 1-5 Units, HS    lidocaine (LIDODERM) 5 % patch 1 patch, Daily    ondansetron (ZOFRAN) injection 4 mg, Q4H PRN    sodium bicarbonate  tablet 1,300 mg, TID after meals    thiamine tablet 100 mg, Daily    Administrative Statements   Today, Patient Was Seen By: Mojgan Young MD      **Please Note: This note may have been constructed using a voice recognition system.**

## 2025-06-05 NOTE — ASSESSMENT & PLAN NOTE
Patient presented with acute kidney injury with a creatinine of 1.99  History of CKD 3: baseline creatinine approximately 1.5-2.0  Appreciate nephrology evaluation and recommendations: Secondary to prerenal azotemia, diuresis, subsequent ATN  in the setting of acute liver failure  Creatinine worsened to 2.9  Status post IV fluids  Renal ultrasound: No hydronephrosis.  Increased echogenicity consistent with medical renal disease  Patient started on IV albumin and sodium bicarbonate infusion by nephrology  Urine sodium not consistent with hepatorenal syndrome  Creatinine appears to have plateaued 2.8-2.9, suspect this may be his new baseline  Per nephrology: Will discharge on bicarbonate supplements  Outpatient follow-up with nephrology      Results from last 7 days   Lab Units 06/05/25  0502 06/04/25  0426 06/03/25  0448 06/02/25  0523 06/01/25  0525 05/31/25  0508 05/30/25  0533   BUN mg/dL 37* 36* 39* 37* 36* 30* 24   CREATININE mg/dL 2.89* 2.91* 2.94* 2.76* 2.68* 2.67* 2.55*   EGFR ml/min/1.73sq m 23 23 22 24 25 25 27

## 2025-06-05 NOTE — ASSESSMENT & PLAN NOTE
ZENAIDA most likely secondary to prerenal azotemia secondary to osmotic diuresis plus some component of intravascular volume depletion in light of hypoalbuminemia with subsequent ATN versus HRS in the presence of acute liver injury/hyperbilirubinemia  After review of records it appears that the patient has a baseline Creatinine of 1.5-2.0 mg/dL.  Admission creatinine of 1.99 mg/dL on 5/23.  Status post albumin with minimal improvement.  Urine sodium 46 (not consistent with HRS), urine microscopy 10-20 RBCs, 4-10 white cells and 10-25 hyaline cast.

## 2025-06-05 NOTE — ASSESSMENT & PLAN NOTE
No evidence of blood loss.  Transfused 1 unit PRBC 5/28, and again 5/30  EGD and colonoscopy February with dieulafoy lesion but no other sources of bleeding  Evaluated by GI: no current endoscopy recommended  Monitor hemoglobin: Stable ranging 7-8    Results from last 7 days   Lab Units 06/05/25  0502 06/04/25  0426 06/02/25  0523 06/01/25  0525 05/31/25  0508 05/30/25  0533   HEMOGLOBIN g/dL 7.8* 7.6* 8.3* 8.2* 8.9* 6.7*

## 2025-06-05 NOTE — PLAN OF CARE
Problem: PAIN - ADULT  Goal: Verbalizes/displays adequate comfort level or baseline comfort level  Description: Interventions:  - Encourage patient to monitor pain and request assistance  - Assess pain using appropriate pain scale  - Administer analgesics as ordered based on type and severity of pain and evaluate response  - Implement non-pharmacological measures as appropriate and evaluate response  - Consider cultural and social influences on pain and pain management  - Notify physician/advanced practitioner if interventions unsuccessful or patient reports new pain  - Educate patient/family on pain management process including their role and importance of  reporting pain   - Provide non-pharmacologic/complimentary pain relief interventions  Outcome: Progressing     Problem: INFECTION - ADULT  Goal: Absence or prevention of progression during hospitalization  Description: INTERVENTIONS:  - Assess and monitor for signs and symptoms of infection  - Monitor lab/diagnostic results  - Monitor all insertion sites, i.e. indwelling lines, tubes, and drains  - Monitor endotracheal if appropriate and nasal secretions for changes in amount and color  - [place] appropriate cooling/warming therapies per order  - Administer medications as ordered  - Instruct and encourage patient and family to use good hand hygiene technique  - Identify and instruct in appropriate isolation precautions for identified infection/condition  Outcome: Progressing  Goal: Absence of fever/infection during neutropenic period  Description: INTERVENTIONS:  - Monitor WBC  - Perform strict hand hygiene  - Limit to healthy visitors only  - No plants, dried, fresh or silk flowers with [place] in patient room  Outcome: Progressing     Problem: SAFETY ADULT  Goal: Patient will remain free of falls  Description: INTERVENTIONS:  - Educate patient/family on patient safety including physical limitations  - Instruct patient to call for assistance with activity   -  Consider consulting OT/PT to assist with strengthening/mobility based on AM PAC & JH-HLM score  - Consult OT/PT to assist with strengthening/mobility   - Keep Call bell within reach  - Keep bed low and locked with side rails adjusted as appropriate  - Keep care items and personal belongings within reach  - Initiate and maintain comfort rounds  - Make Fall Risk Sign visible to staff  - Offer Toileting every 2 Hours, in advance of need  - Initiate/Maintain bed alarm  - Obtain necessary fall risk management equipment: alarms  - Apply yellow socks and bracelet for high fall risk patients  - Consider moving patient to room near nurses station  Outcome: Progressing  Goal: Maintain or return to baseline ADL function  Description: INTERVENTIONS:  -  Assess patient's ability to carry out ADLs; assess patient's baseline for ADL function and identify physical deficits which impact ability to perform ADLs (bathing, care of mouth/teeth, toileting, grooming, dressing, etc.)  - Assess/evaluate cause of self-care deficits   - Assess range of motion  - Assess patient's mobility; develop plan if impaired  - Assess patient's need for assistive devices and provide as appropriate  - Encourage maximum independence but intervene and supervise when necessary  - Involve family in performance of ADLs  - Assess for home care needs following discharge   - Consider OT consult to assist with ADL evaluation and planning for discharge  - Provide patient education as appropriate  - Monitor functional capacity and physical performance, use of AM PAC & JH-HLM   - Monitor gait, balance and fatigue with ambulation    Outcome: Progressing  Goal: Maintains/Returns to pre admission functional level  Description: INTERVENTIONS:  - Perform AM-PAC 6 Click Basic Mobility/ Daily Activity assessment daily.  - Set and communicate daily mobility goal to care team and patient/family/caregiver.   - Collaborate with rehabilitation services on mobility goals if  consulted  - Perform Range of Motion 4 times a day.  - Reposition patient every 2 hours.  - Dangle patient 3 times a day  - Stand patient 3 times a day  - Ambulate patient 3 times a day  - Out of bed to chair 3 times a day   - Out of bed for meals 3 times a day  - Out of bed for toileting  - Record patient progress and toleration of activity level   Outcome: Progressing     Problem: DISCHARGE PLANNING  Goal: Discharge to home or other facility with appropriate resources  Description: INTERVENTIONS:  - Identify barriers to discharge w/patient and caregiver  - Arrange for needed discharge resources and transportation as appropriate  - Identify discharge learning needs (meds, wound care, etc.)  - Arrange for interpretive services to assist at discharge as needed  - Refer to Case Management Department for coordinating discharge planning if the patient needs post-hospital services based on physician/advanced practitioner order or complex needs related to functional status, cognitive ability, or social support system  Outcome: Progressing     Problem: Knowledge Deficit  Goal: Patient/family/caregiver demonstrates understanding of disease process, treatment plan, medications, and discharge instructions  Description: Complete learning assessment and assess knowledge base.  Interventions:  - Provide teaching at level of understanding  - Provide teaching via preferred learning methods  Outcome: Progressing     Problem: GASTROINTESTINAL - ADULT  Goal: Minimal or absence of nausea and/or vomiting  Description: INTERVENTIONS:  - Administer IV fluids if ordered to ensure adequate hydration  - Maintain NPO status until nausea and vomiting are resolved  - Nasogastric tube if ordered  - Administer ordered antiemetic medications as needed  - Provide nonpharmacologic comfort measures as appropriate  - Advance diet as tolerated, if ordered  - Consider nutrition services referral to assist patient with adequate nutrition and appropriate  food choices  Outcome: Progressing  Goal: Maintains adequate nutritional intake  Description: INTERVENTIONS:  - Monitor percentage of each meal consumed  - Identify factors contributing to decreased intake, treat as appropriate  - Assist with meals as needed  - Monitor I&O, weight, and lab values if indicated  - Obtain nutrition services referral as needed  Outcome: Progressing     Problem: METABOLIC, FLUID AND ELECTROLYTES - ADULT  Goal: Electrolytes maintained within normal limits  Description: INTERVENTIONS:  - Monitor labs and assess patient for signs and symptoms of electrolyte imbalances  - Administer electrolyte replacement as ordered  - Monitor response to electrolyte replacements, including repeat lab results as appropriate  - Instruct patient on fluid and nutrition as appropriate  Outcome: Progressing  Goal: Fluid balance maintained  Description: INTERVENTIONS:  - Monitor labs   - Monitor I/O and WT  - Instruct patient on fluid and nutrition as appropriate  - Assess for signs & symptoms of volume excess or deficit  Outcome: Progressing  Goal: Glucose maintained within target range  Description: INTERVENTIONS:  - Monitor Blood Glucose as ordered  - Assess for signs and symptoms of hyperglycemia and hypoglycemia  - Administer ordered medications to maintain glucose within target range  - Assess nutritional intake and initiate nutrition service referral as needed  Outcome: Progressing     Problem: SKIN/TISSUE INTEGRITY - ADULT  Goal: Skin Integrity remains intact(Skin Breakdown Prevention)  Description: Assess:  -Perform Jose assessment every shift  -Clean and moisturize skin every shift  -Inspect skin when repositioning, toileting, and assisting with ADLS  -Assess under medical devices such as IV every shift  -Assess extremities for adequate circulation and sensation     Bed Management:  -Have minimal linens on bed & keep smooth, unwrinkled  -Change linens as needed when moist or perspiring  -Avoid sitting  or lying in one position for more than 2 hours while in bed  -Keep HOB at 30 degrees     Toileting:  -Offer bedside commode  -Assess for incontinence every shift  -Use incontinent care products after each incontinent episode such as foam cleanser    Activity:  -Mobilize patient 3 times a day  -Encourage activity and walks on unit  -Encourage or provide ROM exercises   -Turn and reposition patient every 2 Hours  -Use appropriate equipment to lift or move patient in bed  -Instruct/ Assist with weight shifting every hour when out of bed in chair  -Consider limitation of chair time 1 hour intervals    Skin Care:  -Avoid use of baby powder, tape, friction and shearing, hot water or constrictive clothing  -Relieve pressure over bony prominences using pillows  -Do not massage red bony areas    Next Steps:  -Teach patient strategies to minimize risks such as skin breakdown   -Consider consults to  interdisciplinary teams such as wound care  Outcome: Progressing     Problem: HEMATOLOGIC - ADULT  Goal: Maintains hematologic stability  Description: INTERVENTIONS  - Assess for signs and symptoms of bleeding or hemorrhage  - Monitor labs  - Administer supportive blood products/factors as ordered and appropriate  Outcome: Progressing     Problem: MUSCULOSKELETAL - ADULT  Goal: Maintain or return mobility to safest level of function  Description: INTERVENTIONS:  - Assess patient's ability to carry out ADLs; assess patient's baseline for ADL function and identify physical deficits which impact ability to perform ADLs (bathing, care of mouth/teeth, toileting, grooming, dressing, etc.)  - Assess/evaluate cause of self-care deficits   - Assess range of motion  - Assess patient's mobility  - Assess patient's need for assistive devices and provide as appropriate  - Encourage maximum independence but intervene and supervise when necessary  - Involve family in performance of ADLs  - Assess for home care needs following discharge   -  Consider OT consult to assist with ADL evaluation and planning for discharge  - Provide patient education as appropriate  Outcome: Progressing  Goal: Maintain proper alignment of affected body part  Description: INTERVENTIONS:  - Support, maintain and protect limb and body alignment  - Provide patient/ family with appropriate education  Outcome: Progressing     Problem: Prexisting or High Potential for Compromised Skin Integrity  Goal: Skin integrity is maintained or improved  Description: INTERVENTIONS:  - Identify patients at risk for skin breakdown  - Assess and monitor skin integrity including under and around medical devices   - Assess and monitor nutrition and hydration status  - Monitor labs  - Assess for incontinence   - Turn and reposition patient  - Assist with mobility/ambulation  - Relieve pressure over keith prominences   - Avoid friction and shearing  - Provide appropriate hygiene as needed including keeping skin clean and dry  - Evaluate need for skin moisturizer/barrier cream  - Collaborate with interdisciplinary team  - Patient/family teaching  - Consider wound care consult    Assess:  - Review Jose scale daily  - Clean and moisturize skin every shift  - Inspect skin when repositioning, toileting, and assisting with ADLS  - Assess under medical devices such as IV every shift  - Assess extremities for adequate circulation and sensation     Bed Management:  - Have minimal linens on bed & keep smooth, unwrinkled  - Change linens as needed when moist or perspiring  - Avoid sitting or lying in one position for more than 2 hours while in bed?Keep HOB at 30 degrees   - Toileting:  - Offer bedside commode  - Assess for incontinence every shift  - Use incontinent care products after each incontinent episode such as foam cleanser    Activity:  - Mobilize patient 3 times a day  - Encourage activity and walks on unit  - Encourage or provide ROM exercises   - Turn and reposition patient every 2 Hours  - Use  appropriate equipment to lift or move patient in bed  - Instruct/ Assist with weight shifting every hour when out of bed in chair  - Consider limitation of chair time 12 hour intervals    Skin Care:  - Avoid use of baby powder, tape, friction and shearing, hot water or constrictive clothing  - Relieve pressure over bony prominences using pillows  - Do not massage red bony areas    Next Steps:  - Teach patient strategies to minimize risks such as skin breakdown  - Consider consults to  interdisciplinary teams such as wound care  Outcome: Progressing     Problem: MOBILITY - ADULT  Goal: Maintain or return to baseline ADL function  Description: INTERVENTIONS:  -  Assess patient's ability to carry out ADLs; assess patient's baseline for ADL function and identify physical deficits which impact ability to perform ADLs (bathing, care of mouth/teeth, toileting, grooming, dressing, etc.)  - Assess/evaluate cause of self-care deficits   - Assess range of motion  - Assess patient's mobility; develop plan if impaired  - Assess patient's need for assistive devices and provide as appropriate  - Encourage maximum independence but intervene and supervise when necessary  - Involve family in performance of ADLs  - Assess for home care needs following discharge   - Consider OT consult to assist with ADL evaluation and planning for discharge  - Provide patient education as appropriate  - Monitor functional capacity and physical performance, use of AM PAC & JH-HLM   - Monitor gait, balance and fatigue with ambulation    Outcome: Progressing  Goal: Maintains/Returns to pre admission functional level  Description: INTERVENTIONS:  - Perform AM-PAC 6 Click Basic Mobility/ Daily Activity assessment daily.  - Set and communicate daily mobility goal to care team and patient/family/caregiver.   - Collaborate with rehabilitation services on mobility goals if consulted  - Perform Range of Motion 4 times a day.  - Reposition patient every 2  hours.  - Dangle patient 3 times a day  - Stand patient 3 times a day  - Ambulate patient 3 times a day  - Out of bed to chair 3 times a day   - Out of bed for meals 3 times a day  - Out of bed for toileting  - Record patient progress and toleration of activity level   Outcome: Progressing

## 2025-06-05 NOTE — ASSESSMENT & PLAN NOTE
Management as per gastroenterology.  Consideration for possible liver biopsy.  Post paracentesis  MRI: Chronic pancreatitis with small bilateral effusions.

## 2025-06-05 NOTE — PLAN OF CARE
Problem: PAIN - ADULT  Goal: Verbalizes/displays adequate comfort level or baseline comfort level  Description: Interventions:  - Encourage patient to monitor pain and request assistance  - Assess pain using appropriate pain scale  - Administer analgesics as ordered based on type and severity of pain and evaluate response  - Implement non-pharmacological measures as appropriate and evaluate response  - Consider cultural and social influences on pain and pain management  - Notify physician/advanced practitioner if interventions unsuccessful or patient reports new pain  - Educate patient/family on pain management process including their role and importance of  reporting pain   - Provide non-pharmacologic/complimentary pain relief interventions  Outcome: Progressing     Problem: INFECTION - ADULT  Goal: Absence or prevention of progression during hospitalization  Description: INTERVENTIONS:  - Assess and monitor for signs and symptoms of infection  - Monitor lab/diagnostic results  - Monitor all insertion sites, i.e. indwelling lines, tubes, and drains  - Monitor endotracheal if appropriate and nasal secretions for changes in amount and color  - Glen appropriate cooling/warming therapies per order  - Administer medications as ordered  - Instruct and encourage patient and family to use good hand hygiene technique  - Identify and instruct in appropriate isolation precautions for identified infection/condition  Outcome: Progressing  Goal: Absence of fever/infection during neutropenic period  Description: INTERVENTIONS:  - Monitor WBC  - Perform strict hand hygiene  - Limit to healthy visitors only  - No plants, dried, fresh or silk flowers with mcclure in patient room  Outcome: Progressing     Problem: SAFETY ADULT  Goal: Patient will remain free of falls  Description: INTERVENTIONS:  - Educate patient/family on patient safety including physical limitations  - Instruct patient to call for assistance with activity   -  Consider consulting OT/PT to assist with strengthening/mobility based on AM PAC & JH-HLM score  - Consult OT/PT to assist with strengthening/mobility   - Keep Call bell within reach  - Keep bed low and locked with side rails adjusted as appropriate  - Keep care items and personal belongings within reach  - Initiate and maintain comfort rounds  - Make Fall Risk Sign visible to staff  - Offer Toileting every 2 Hours, in advance of need  - Initiate/Maintain bed alarm  - Obtain necessary fall risk management equipment: alarms  - Apply yellow socks and bracelet for high fall risk patients  - Consider moving patient to room near nurses station  Outcome: Progressing  Goal: Maintain or return to baseline ADL function  Description: INTERVENTIONS:  -  Assess patient's ability to carry out ADLs; assess patient's baseline for ADL function and identify physical deficits which impact ability to perform ADLs (bathing, care of mouth/teeth, toileting, grooming, dressing, etc.)  - Assess/evaluate cause of self-care deficits   - Assess range of motion  - Assess patient's mobility; develop plan if impaired  - Assess patient's need for assistive devices and provide as appropriate  - Encourage maximum independence but intervene and supervise when necessary  - Involve family in performance of ADLs  - Assess for home care needs following discharge   - Consider OT consult to assist with ADL evaluation and planning for discharge  - Provide patient education as appropriate  - Monitor functional capacity and physical performance, use of AM PAC & JH-HLM   - Monitor gait, balance and fatigue with ambulation    Outcome: Progressing  Goal: Maintains/Returns to pre admission functional level  Description: INTERVENTIONS:  - Perform AM-PAC 6 Click Basic Mobility/ Daily Activity assessment daily.  - Set and communicate daily mobility goal to care team and patient/family/caregiver.   - Collaborate with rehabilitation services on mobility goals if  consulted  - Perform Range of Motion 4 times a day.  - Reposition patient every 2 hours.  - Dangle patient 3 times a day  - Stand patient 3 times a day  - Ambulate patient 3 times a day  - Out of bed to chair 3 times a day   - Out of bed for meals 3 times a day  - Out of bed for toileting  - Record patient progress and toleration of activity level   Outcome: Progressing     Problem: DISCHARGE PLANNING  Goal: Discharge to home or other facility with appropriate resources  Description: INTERVENTIONS:  - Identify barriers to discharge w/patient and caregiver  - Arrange for needed discharge resources and transportation as appropriate  - Identify discharge learning needs (meds, wound care, etc.)  - Arrange for interpretive services to assist at discharge as needed  - Refer to Case Management Department for coordinating discharge planning if the patient needs post-hospital services based on physician/advanced practitioner order or complex needs related to functional status, cognitive ability, or social support system  Outcome: Progressing     Problem: Knowledge Deficit  Goal: Patient/family/caregiver demonstrates understanding of disease process, treatment plan, medications, and discharge instructions  Description: Complete learning assessment and assess knowledge base.  Interventions:  - Provide teaching at level of understanding  - Provide teaching via preferred learning methods  Outcome: Progressing     Problem: GASTROINTESTINAL - ADULT  Goal: Minimal or absence of nausea and/or vomiting  Description: INTERVENTIONS:  - Administer IV fluids if ordered to ensure adequate hydration  - Maintain NPO status until nausea and vomiting are resolved  - Nasogastric tube if ordered  - Administer ordered antiemetic medications as needed  - Provide nonpharmacologic comfort measures as appropriate  - Advance diet as tolerated, if ordered  - Consider nutrition services referral to assist patient with adequate nutrition and appropriate  food choices  Outcome: Progressing  Goal: Maintains adequate nutritional intake  Description: INTERVENTIONS:  - Monitor percentage of each meal consumed  - Identify factors contributing to decreased intake, treat as appropriate  - Assist with meals as needed  - Monitor I&O, weight, and lab values if indicated  - Obtain nutrition services referral as needed  Outcome: Progressing     Problem: METABOLIC, FLUID AND ELECTROLYTES - ADULT  Goal: Electrolytes maintained within normal limits  Description: INTERVENTIONS:  - Monitor labs and assess patient for signs and symptoms of electrolyte imbalances  - Administer electrolyte replacement as ordered  - Monitor response to electrolyte replacements, including repeat lab results as appropriate  - Instruct patient on fluid and nutrition as appropriate  Outcome: Progressing  Goal: Fluid balance maintained  Description: INTERVENTIONS:  - Monitor labs   - Monitor I/O and WT  - Instruct patient on fluid and nutrition as appropriate  - Assess for signs & symptoms of volume excess or deficit  Outcome: Progressing  Goal: Glucose maintained within target range  Description: INTERVENTIONS:  - Monitor Blood Glucose as ordered  - Assess for signs and symptoms of hyperglycemia and hypoglycemia  - Administer ordered medications to maintain glucose within target range  - Assess nutritional intake and initiate nutrition service referral as needed  Outcome: Progressing     Problem: SKIN/TISSUE INTEGRITY - ADULT  Goal: Skin Integrity remains intact(Skin Breakdown Prevention)  Description: Assess:  -Perform Jose assessment every shift  -Clean and moisturize skin every shift  -Inspect skin when repositioning, toileting, and assisting with ADLS  -Assess under medical devices such as IV every shift  -Assess extremities for adequate circulation and sensation     Bed Management:  -Have minimal linens on bed & keep smooth, unwrinkled  -Change linens as needed when moist or perspiring  -Avoid sitting  or lying in one position for more than 2 hours while in bed  -Keep HOB at 30 degrees     Toileting:  -Offer bedside commode  -Assess for incontinence every shift  -Use incontinent care products after each incontinent episode such as foam cleanser    Activity:  -Mobilize patient 3 times a day  -Encourage activity and walks on unit  -Encourage or provide ROM exercises   -Turn and reposition patient every 2 Hours  -Use appropriate equipment to lift or move patient in bed  -Instruct/ Assist with weight shifting every hour when out of bed in chair  -Consider limitation of chair time 1 hour intervals    Skin Care:  -Avoid use of baby powder, tape, friction and shearing, hot water or constrictive clothing  -Relieve pressure over bony prominences using pillows  -Do not massage red bony areas    Next Steps:  -Teach patient strategies to minimize risks such as skin breakdown   -Consider consults to  interdisciplinary teams such as wound care  Outcome: Progressing     Problem: HEMATOLOGIC - ADULT  Goal: Maintains hematologic stability  Description: INTERVENTIONS  - Assess for signs and symptoms of bleeding or hemorrhage  - Monitor labs  - Administer supportive blood products/factors as ordered and appropriate  Outcome: Progressing     Problem: MUSCULOSKELETAL - ADULT  Goal: Maintain or return mobility to safest level of function  Description: INTERVENTIONS:  - Assess patient's ability to carry out ADLs; assess patient's baseline for ADL function and identify physical deficits which impact ability to perform ADLs (bathing, care of mouth/teeth, toileting, grooming, dressing, etc.)  - Assess/evaluate cause of self-care deficits   - Assess range of motion  - Assess patient's mobility  - Assess patient's need for assistive devices and provide as appropriate  - Encourage maximum independence but intervene and supervise when necessary  - Involve family in performance of ADLs  - Assess for home care needs following discharge   -  Consider OT consult to assist with ADL evaluation and planning for discharge  - Provide patient education as appropriate  Outcome: Progressing  Goal: Maintain proper alignment of affected body part  Description: INTERVENTIONS:  - Support, maintain and protect limb and body alignment  - Provide patient/ family with appropriate education  Outcome: Progressing     Problem: Prexisting or High Potential for Compromised Skin Integrity  Goal: Skin integrity is maintained or improved  Description: INTERVENTIONS:  - Identify patients at risk for skin breakdown  - Assess and monitor skin integrity including under and around medical devices   - Assess and monitor nutrition and hydration status  - Monitor labs  - Assess for incontinence   - Turn and reposition patient  - Assist with mobility/ambulation  - Relieve pressure over keith prominences   - Avoid friction and shearing  - Provide appropriate hygiene as needed including keeping skin clean and dry  - Evaluate need for skin moisturizer/barrier cream  - Collaborate with interdisciplinary team  - Patient/family teaching  - Consider wound care consult    Assess:  - Review Jose scale daily  - Clean and moisturize skin every shift  - Inspect skin when repositioning, toileting, and assisting with ADLS  - Assess under medical devices such as IV every shift  - Assess extremities for adequate circulation and sensation     Bed Management:  - Have minimal linens on bed & keep smooth, unwrinkled  - Change linens as needed when moist or perspiring  - Avoid sitting or lying in one position for more than 2 hours while in bed?Keep HOB at 30 degrees   - Toileting:  - Offer bedside commode  - Assess for incontinence every shift  - Use incontinent care products after each incontinent episode such as foam cleanser    Activity:  - Mobilize patient 3 times a day  - Encourage activity and walks on unit  - Encourage or provide ROM exercises   - Turn and reposition patient every 2 Hours  - Use  appropriate equipment to lift or move patient in bed  - Instruct/ Assist with weight shifting every hour when out of bed in chair  - Consider limitation of chair time 12 hour intervals    Skin Care:  - Avoid use of baby powder, tape, friction and shearing, hot water or constrictive clothing  - Relieve pressure over bony prominences using pillows  - Do not massage red bony areas    Next Steps:  - Teach patient strategies to minimize risks such as skin breakdown  - Consider consults to  interdisciplinary teams such as wound care  Outcome: Progressing     Problem: MOBILITY - ADULT  Goal: Maintain or return to baseline ADL function  Description: INTERVENTIONS:  -  Assess patient's ability to carry out ADLs; assess patient's baseline for ADL function and identify physical deficits which impact ability to perform ADLs (bathing, care of mouth/teeth, toileting, grooming, dressing, etc.)  - Assess/evaluate cause of self-care deficits   - Assess range of motion  - Assess patient's mobility; develop plan if impaired  - Assess patient's need for assistive devices and provide as appropriate  - Encourage maximum independence but intervene and supervise when necessary  - Involve family in performance of ADLs  - Assess for home care needs following discharge   - Consider OT consult to assist with ADL evaluation and planning for discharge  - Provide patient education as appropriate  - Monitor functional capacity and physical performance, use of AM PAC & JH-HLM   - Monitor gait, balance and fatigue with ambulation    Outcome: Progressing  Goal: Maintains/Returns to pre admission functional level  Description: INTERVENTIONS:  - Perform AM-PAC 6 Click Basic Mobility/ Daily Activity assessment daily.  - Set and communicate daily mobility goal to care team and patient/family/caregiver.   - Collaborate with rehabilitation services on mobility goals if consulted  - Perform Range of Motion 4 times a day.  - Reposition patient every 2  hours.  - Dangle patient 3 times a day  - Stand patient 3 times a day  - Ambulate patient 3 times a day  - Out of bed to chair 3 times a day   - Out of bed for meals 3 times a day  - Out of bed for toileting  - Record patient progress and toleration of activity level   Outcome: Progressing

## 2025-06-05 NOTE — ASSESSMENT & PLAN NOTE
Lab Results   Component Value Date    HGBA1C 9.6 (H) 05/26/2025   On insulin  Recommend tight glycemic control  Noted diabetic kidney disease with prior albumin creatinine ratio 3959

## 2025-06-05 NOTE — ASSESSMENT & PLAN NOTE
Evidence of worsening hyperbilirubinemia, elevated alkaline phosphatase and coagulopathy on admission  No significant transaminitis: Initially mild elevated AST  evaluated by gastroenterology.    Noted to have chronically elevated liver enzymes: Acute rise this admission was attributed to acute alcoholic hepatitis  Discriminant function did not meet criteria for steroids  Per GI: No stigmata of chronic liver disease on exam  Outpatient elastography versus liver biopsy for definitive diagnosis  Serologic workup negative for etiology of liver injury  Initial coagulopathy resolved with vitamin K  MRCP: Obstructing pancreatic head calculi.  Seen previously.  Stable main pancreatic ductal dilatation.  No intrahepatic biliary ductal dilatation.  Ruled out infection: Cultures no growth  Renal ultrasound: Moderate ascites  IR paracentesis 6/3: 1.7 L clear fluid  SAAG =2.5 c/w portal HTN  Spoke with GI: Dr. Esparza: No additional inpatient workup required.  He notes they have referred patient to outpatient hepatology clinic    Results from last 7 days   Lab Units 06/05/25  0502 06/04/25  0426 06/03/25  0448 06/02/25  0523 06/01/25  0525 05/31/25  0508 05/30/25  0533   AST U/L 56* 44* 49* 45* 37 35 35   ALT U/L 34 30 31 28 24 23 19   TOTAL BILIRUBIN mg/dL 9.21* 8.85* 8.47* 8.63* 9.11* 10.99* 7.84*     Results from last 7 days   Lab Units 06/04/25  0426 06/02/25  0523 05/30/25  0533   INR  1.16 1.09 1.07

## 2025-06-05 NOTE — PHYSICAL THERAPY NOTE
Physical Therapy Treatment Note     06/05/25 1141   PT Last Visit   PT Visit Date 06/05/25   Note Type   Note Type Treatment   Pain Assessment   Pain Assessment Tool 0-10   Pain Score No Pain   Restrictions/Precautions   Weight Bearing Precautions Per Order No   Other Precautions Bed Alarm;Fall Risk;Telemetry;Hard of hearing  (language barrier)   General   Chart Reviewed Yes   Family/Caregiver Present No   Subjective   Subjective Pt. agreeable to PT   Bed Mobility   Supine to Sit 6  Modified independent   Additional items Bedrails   Transfers   Sit to Stand 6  Modified independent   Stand to Sit 6  Modified independent   Stand pivot 6  Modified independent   Toilet transfer 6  Modified independent   Additional items Standard toilet   Ambulation/Elevation   Gait pattern Decreased foot clearance;Narrow ELLIOT;Short stride;Excessively slow;Decreased toe off;Decreased heel strike   Gait Assistance 5  Supervision   Additional items Assist x 1   Assistive Device Rolling walker   Distance ~500ft with 4 < 10 sec. standing rest   Stair Management Assistance 5  Supervision   Additional items Assist x 1   Stair Management Technique Two rails;Step to pattern;Foreward;Nonreciprocal   Number of Stairs 4   Balance   Static Sitting Normal   Dynamic Sitting Good   Static Standing Fair +   Dynamic Standing Fair   Ambulatory Fair  (with RW)   Endurance Deficit   Endurance Deficit No   Activity Tolerance   Activity Tolerance Patient tolerated treatment well   Nurse Made Aware Yes   Assessment   Prognosis Good   Problem List Decreased mobility   Assessment Pt. progressing well with overall mobility and needed no hands on A for any mobility. Pt. noted with no LOB t/o session. Pt. had 4x brief standing rests between ambulation due to fatigue. Talked to Michele PT regardubng patient;s progress and to JULIA pt. from PT caseload and agreeable to it. Pt. was in toilet at the end of the session and RN okayed to leave pt. in the toilet. JULIA pt. from  caseload as pt. has achieved all PT goals set in IE and no further skilled therapy services needed at this time.   Barriers to Discharge None   Goals   Patient Goals None reported   STG Expiration Date 06/12/25   PT Treatment Day 2   Plan   Treatment/Interventions Functional transfer training;Elevations;Spoke to nursing;Gait training;Bed mobility;Equipment eval/education;Patient/family training   Progress Discontinue PT   PT Frequency 3-5x/wk   Discharge Recommendation   Rehab Resource Intensity Level, PT No post-acute rehabilitation needs   Equipment Recommended Walker   AM-PAC Basic Mobility Inpatient   Turning in Flat Bed Without Bedrails 4   Lying on Back to Sitting on Edge of Flat Bed Without Bedrails 4   Moving Bed to Chair 4   Standing Up From Chair Using Arms 4   Walk in Room 4   Climb 3-5 Stairs With Railing 4   Basic Mobility Inpatient Raw Score 24   Basic Mobility Standardized Score 57.68   [facility] Highest Level Of Mobility   -[facility] Goal 8: Walk 250 feet or more   -HLM Achieved 8: Walk 250 feet ot more   End of Consult   Patient Position at End of Consult All needs within reach;Other (comment)  (toilet)           Meaghan Mendoza PTA    An AM-PAC basic mobility standardized score less than 42.9 suggest the patient may benefit from discharge to post-acute rehab services.

## 2025-06-06 VITALS
WEIGHT: 134.92 LBS | OXYGEN SATURATION: 97 % | RESPIRATION RATE: 16 BRPM | BODY MASS INDEX: 19.98 KG/M2 | SYSTOLIC BLOOD PRESSURE: 134 MMHG | HEART RATE: 75 BPM | DIASTOLIC BLOOD PRESSURE: 65 MMHG | HEIGHT: 69 IN | TEMPERATURE: 98.5 F

## 2025-06-06 LAB
ALBUMIN SERPL BCG-MCNC: 3.5 G/DL (ref 3.5–5)
ALP SERPL-CCNC: 398 U/L (ref 34–104)
ALT SERPL W P-5'-P-CCNC: 33 U/L (ref 7–52)
ANION GAP SERPL CALCULATED.3IONS-SCNC: 7 MMOL/L (ref 4–13)
AST SERPL W P-5'-P-CCNC: 47 U/L (ref 13–39)
BACTERIA SPEC BFLD CULT: NO GROWTH
BILIRUB SERPL-MCNC: 9.49 MG/DL (ref 0.2–1)
BUN SERPL-MCNC: 41 MG/DL (ref 5–25)
CALCIUM SERPL-MCNC: 9 MG/DL (ref 8.4–10.2)
CHLORIDE SERPL-SCNC: 110 MMOL/L (ref 96–108)
CO2 SERPL-SCNC: 17 MMOL/L (ref 21–32)
CREAT SERPL-MCNC: 3.01 MG/DL (ref 0.6–1.3)
GFR SERPL CREATININE-BSD FRML MDRD: 22 ML/MIN/1.73SQ M
GLUCOSE SERPL-MCNC: 129 MG/DL (ref 65–140)
GLUCOSE SERPL-MCNC: 136 MG/DL (ref 65–140)
GLUCOSE SERPL-MCNC: 225 MG/DL (ref 65–140)
GRAM STN SPEC: NORMAL
POTASSIUM SERPL-SCNC: 5 MMOL/L (ref 3.5–5.3)
PROT SERPL-MCNC: 5.6 G/DL (ref 6.4–8.4)
SODIUM SERPL-SCNC: 134 MMOL/L (ref 135–147)

## 2025-06-06 PROCEDURE — 80053 COMPREHEN METABOLIC PANEL: CPT | Performed by: INTERNAL MEDICINE

## 2025-06-06 PROCEDURE — 82948 REAGENT STRIP/BLOOD GLUCOSE: CPT

## 2025-06-06 PROCEDURE — 99239 HOSP IP/OBS DSCHRG MGMT >30: CPT | Performed by: INTERNAL MEDICINE

## 2025-06-06 PROCEDURE — 99232 SBSQ HOSP IP/OBS MODERATE 35: CPT | Performed by: INTERNAL MEDICINE

## 2025-06-06 RX ORDER — FOLIC ACID 1 MG/1
1 TABLET ORAL DAILY
Qty: 30 TABLET | Refills: 0 | Status: SHIPPED | OUTPATIENT
Start: 2025-06-06 | End: 2025-07-06

## 2025-06-06 RX ORDER — CARVEDILOL 6.25 MG/1
6.25 TABLET ORAL 2 TIMES DAILY WITH MEALS
Qty: 60 TABLET | Refills: 0 | Status: SHIPPED | OUTPATIENT
Start: 2025-06-06

## 2025-06-06 RX ORDER — INSULIN HUMAN 100 [IU]/ML
8 INJECTION, SUSPENSION SUBCUTANEOUS
Start: 2025-06-06

## 2025-06-06 RX ORDER — SODIUM BICARBONATE 650 MG/1
1300 TABLET ORAL
Qty: 90 TABLET | Refills: 0 | Status: SHIPPED | OUTPATIENT
Start: 2025-06-06

## 2025-06-06 RX ADMIN — Medication 100 MG: at 07:32

## 2025-06-06 RX ADMIN — DICLOFENAC SODIUM 2 G: 10 GEL TOPICAL at 07:34

## 2025-06-06 RX ADMIN — INSULIN ASPART 8 UNITS: 100 INJECTION, SUSPENSION SUBCUTANEOUS at 07:46

## 2025-06-06 RX ADMIN — AMLODIPINE BESYLATE 5 MG: 5 TABLET ORAL at 07:32

## 2025-06-06 RX ADMIN — FOLIC ACID 1 MG: 1 TABLET ORAL at 07:32

## 2025-06-06 RX ADMIN — SODIUM BICARBONATE 650 MG TABLET 1300 MG: at 07:32

## 2025-06-06 RX ADMIN — LIDOCAINE 1 PATCH: 50 PATCH CUTANEOUS at 07:31

## 2025-06-06 RX ADMIN — SODIUM BICARBONATE 650 MG TABLET 1300 MG: at 11:53

## 2025-06-06 RX ADMIN — HYDRALAZINE HYDROCHLORIDE 25 MG: 25 TABLET ORAL at 05:41

## 2025-06-06 RX ADMIN — INSULIN LISPRO 2 UNITS: 100 INJECTION, SOLUTION INTRAVENOUS; SUBCUTANEOUS at 07:46

## 2025-06-06 RX ADMIN — CARVEDILOL 6.25 MG: 6.25 TABLET, FILM COATED ORAL at 07:32

## 2025-06-06 RX ADMIN — HYDRALAZINE HYDROCHLORIDE 25 MG: 25 TABLET ORAL at 13:29

## 2025-06-06 RX ADMIN — HEPARIN SODIUM 5000 UNITS: 5000 INJECTION INTRAVENOUS; SUBCUTANEOUS at 05:40

## 2025-06-06 NOTE — ASSESSMENT & PLAN NOTE
Lab Results   Component Value Date    HGBA1C 9.6 (H) 05/26/2025   On insulin  Recommend tight glycemic control  Noted diabetic kidney disease with prior albumin creatinine ratio [id number]

## 2025-06-06 NOTE — PROGRESS NOTES
NEPHROLOGY HOSPITAL PROGRESS NOTE   Wes Araujo 55 y.o. male MRN: [id number]  Unit/Bed#: [place] [address] -01 Encounter: [id number]  Reason for Consult: ZENAIDA    Assessment & Plan  Acute kidney injury (HCC)  ZENAIDA most likely secondary to prerenal azotemia secondary to osmotic diuresis plus some component of intravascular volume depletion in light of hypoalbuminemia with subsequent ATN versus HRS in the presence of acute liver injury/hyperbilirubinemia  After review of records it appears that the patient has a baseline Creatinine of 1.5-2.0 mg/dL.  Admission creatinine of 1.99 mg/dL on 5/23.  Status post albumin with minimal improvement.  Urine sodium 46 (not consistent with HRS), urine microscopy 10-20 RBCs, 4-10 white cells and 10-25 hyaline cast.    Creatinine appears to have plateaued at 2.8-3.0.  May represent new baseline given previous advanced CKD and recent ZENAIDA.  High risk for requiring dialysis in the near future.  If discharged would recommend weekly BMPs x 4.  CKD stage 3b, GFR 30-44 ml/min (HCC)  After review of records it appears that the patient has a baseline Creatinine of 1.5-2.0 mg/dL  Likely has underlying CKD secondary to poorly controlled diabetes plus hypertensive nephrosclerosis plus age-related nephron loss  Hyponatremia  Stable at 134  Primary hypertension    Home medications: Hydralazine 25 mg p.o. every 8, Norvasc 5 mg p.o. twice daily  Current medications: coreg 6.25 mg BID, Norvasc 5 mg p.o. twice daily, hydralazine 25 mg p.o. every 8h  Type 2 diabetes mellitus with hyperglycemia, with long-term current use of insulin (HCC)  Lab Results   Component Value Date    HGBA1C 9.6 (H) 05/26/2025   On insulin  Recommend tight glycemic control  Noted diabetic kidney disease with prior albumin creatinine ratio 5944  Alcoholic hepatitis with ascites  Monitor for withdrawal  Alcohol rehab on discharge  History of atrial fibrillation  Follow-up with cardiology  Abnormal LFTs  Management as per  gastroenterology.  Consideration for possible liver biopsy.  Post paracentesis  MRI: Chronic pancreatitis with small bilateral effusions.  Other specified anemias  Transfuse as needed, s/p PRBC already this admission  Recent hemoglobin 7.8.  Hyperkalemia  Stable currently   Potassium stable at 5.0.  Metabolic acidosis  Continue oral sodium bicarbonate therapy  Although likely component respiratory alkalosis recent pH 7.4/26/16.2  Chronic bilateral low back pain with bilateral sciatica      Summary:  Overall renal function toni fairly stable with a creatinine 3.0, again may represent new baseline given previous advanced CKD and recent ZENAIDA  Continue with current antihypertensive regimen  Avoiding maintenance loop diuretic therapy currently  Stable for discharge from nephrology standpoint would recommend weekly BMPs x 4.    SUBJECTIVE / 24H INTERVAL HISTORY:  Seen and examined.  Patient awake alert.  Offers no new complaints.  No reported chest pain or shortness of breath.  States that he is ready to go home.    OBJECTIVE:  Current Weight: Weight - Scale: 61.2 kg (134 lb 14.7 oz)  Vitals:    06/06/25 0541 06/06/25 0543 06/06/25 0545 06/06/25 0735   BP: 150/72 150/72  134/65   BP Location:    Right arm   Pulse:  75  75   Resp:    16   Temp:    98.5 °F (36.9 °C)   TempSrc:    Oral   SpO2:  96%  97%   Weight:   61.2 kg (134 lb 14.7 oz)    Height:           Intake/Output Summary (Last 24 hours) at 6/6/2025 1118  Last data filed at 6/6/2025 0635  Gross per 24 hour   Intake 1020 ml   Output --   Net 1020 ml       Physical Exam  Constitutional:       Appearance: He is not ill-appearing.     Cardiovascular:      Rate and Rhythm: Normal rate and regular rhythm.   Pulmonary:      Effort: Pulmonary effort is normal.      Breath sounds: Normal breath sounds.   Abdominal:      General: There is no distension.      Palpations: Abdomen is soft.     Musculoskeletal:      Right lower leg: No edema.      Left lower leg: No edema.  "    Skin:     General: Skin is warm and dry.      Coloration: Skin is jaundiced.     Neurological:      Mental Status: He is alert and oriented to person, place, and time.         Medications:  Current Medications[1]    Laboratory Results:  Results from last 7 days   Lab Units 06/06/25  0536 06/05/25  0502 06/04/25  0426 06/03/25  0448 06/02/25  0523 06/01/25  0525 05/31/25  0508   WBC Thousand/uL  --  9.53 9.72  --  9.14 9.92 11.51*   HEMOGLOBIN g/dL  --  7.8* 7.6*  --  8.3* 8.2* 8.9*   HEMATOCRIT %  --  23.0* 21.6*  --  24.6* 23.7* 25.8*   PLATELETS Thousands/uL  --  155 153  --  182 195 220   POTASSIUM mmol/L 5.0 5.0 4.9 5.0 4.9 5.0 4.8   CHLORIDE mmol/L 110* 110* 107 110* 109* 108 108   CO2 mmol/L 17* 16* 17* 15* 16* 17* 20*   BUN mg/dL 41* 37* 36* 39* 37* 36* 30*   CREATININE mg/dL 3.01* 2.89* 2.91* 2.94* 2.76* 2.68* 2.67*   CALCIUM mg/dL 9.0 8.8 9.1 9.0 8.9 8.8 9.1   MAGNESIUM mg/dL  --   --   --   --   --  1.9  --    PHOSPHORUS mg/dL  --   --   --   --   --  3.3 3.0       Portions of the record may have been created with voice recognition software. Occasional wrong word or \"sound a like\" substitutions may have occurred due to the inherent limitations of voice recognition software. Read the chart carefully and recognize, using context, where substitutions have occurred.If you have any questions, please contact the dictating provider.       [1]   Current Facility-Administered Medications:     acetaminophen (TYLENOL) tablet 325 mg, 325 mg, Oral, Q6H PRN, ARIANE Moore, 325 mg at 06/01/25 1029    amLODIPine (NORVASC) tablet 5 mg, 5 mg, Oral, BID, Bibi Solis MD, 5 mg at 06/06/25 0732    carvedilol (COREG) tablet 6.25 mg, 6.25 mg, Oral, BID With Meals, Sonja Siegel PA-C, 6.25 mg at 06/06/25 0732    Diclofenac Sodium (VOLTAREN) 1 % topical gel 2 g, 2 g, Topical, 4x Daily, ARIANE Moore, 2 g at 06/06/25 0734    folic acid (FOLVITE) tablet 1 mg, 1 mg, Oral, Daily, Bala Leroy DO, 1 mg at 06/06/25 " 0732    heparin (porcine) subcutaneous injection 5,000 Units, 5,000 Units, Subcutaneous, Q8H [facility], Mojgan Young MD, 5,000 Units at 06/06/25 0540    hydrALAZINE (APRESOLINE) injection 10 mg, 10 mg, Intravenous, Q4H PRN, ARIANE Leggett, 10 mg at 05/25/25 0318    hydrALAZINE (APRESOLINE) tablet 25 mg, 25 mg, Oral, Q8H [facility], Sonja Siegel PA-C, 25 mg at 06/06/25 0541    insulin aspart protamine-insulin aspart (NovoLOG 70/30) 100 units/mL subcutaneous injection 8 Units, 8 Units, Subcutaneous, BID AC, Mojgan Young MD, 8 Units at 06/06/25 0746    insulin lispro (HumALOG/ADMELOG) 100 units/mL subcutaneous injection 1-5 Units, 1-5 Units, Subcutaneous, TID AC, 2 Units at 06/06/25 0746 **AND** Fingerstick Glucose (POCT), , , TID AC, ARIANE Leggett    insulin lispro (HumALOG/ADMELOG) 100 units/mL subcutaneous injection 1-5 Units, 1-5 Units, Subcutaneous, HS, ARIANE Leggett, 1 Units at 06/03/25 2123    lidocaine (LIDODERM) 5 % patch 1 patch, 1 patch, Topical, Daily, ARIANE Moore, 1 patch at 06/06/25 0731    ondansetron (ZOFRAN) injection 4 mg, 4 mg, Intravenous, Q4H PRN, Bala Leroy DO    sodium bicarbonate tablet 1,300 mg, 1,300 mg, Oral, TID after meals, Vishnu Messer DO, 1,300 mg at 06/06/25 0732    thiamine tablet 100 mg, 100 mg, Oral, Daily, Bala Leroy DO, 100 mg at 06/06/25 0732

## 2025-06-06 NOTE — ASSESSMENT & PLAN NOTE
ZENAIDA most likely secondary to prerenal azotemia secondary to osmotic diuresis plus some component of intravascular volume depletion in light of hypoalbuminemia with subsequent ATN versus HRS in the presence of acute liver injury/hyperbilirubinemia  After review of records it appears that the patient has a baseline Creatinine of 1.5-2.0 mg/dL.  Admission creatinine of 1.99 mg/dL on 5/23.  Status post albumin with minimal improvement.  Urine sodium 46 (not consistent with HRS), urine microscopy 10-20 RBCs, 4-10 white cells and 10-25 hyaline cast.    Creatinine appears to have plateaued at 2.8-3.0.  May represent new baseline given previous advanced CKD and recent ZENAIDA.  High risk for requiring dialysis in the near future.  If discharged would recommend weekly BMPs x 4.

## 2025-06-06 NOTE — ASSESSMENT & PLAN NOTE
History of alcohol use with withdrawal and withdrawal seizures  Father reported active consumption of alcohol  Initially noted to have altered mental status, treated in the ICU with phenobarbital for possible alcohol withdrawal  Continue thiamine and folic acid  Case management following for rehab referrals  Initial discharge plan was for patient to be discharged to inpatient alcohol rehab: Patient however now refuses, at discharge.  He states he will return home where he lives with his mother and father: However states he will pursue complete alcohol cessation

## 2025-06-06 NOTE — DISCHARGE SUMMARY
Discharge Summary - Hospitalist   Name: Wes Araujo 55 y.o. male I MRN: [id number]  Unit/Bed#: [place] [address] -01 I Date of Admission: 5/23/2025   Date of Service: 6/6/2025 I Hospital Day: 14       Discharging Physician / Practitioner: Mojgan Young MD  PCP: Rush Kebede MD  Admission Date:   Admission Orders (From admission, onward)       Ordered        05/23/25 2040  INPATIENT ADMISSION  Once                          Discharge Date: 06/06/25    Next Steps for Physician/AP Assuming Care:  PCP- 1 week  Nephro/Hepatology: 1 week  Repeat labs: cbc/cmp/inr: 1 week    Test Results Pending at Discharge (will require follow up):  none    Medication Changes for Discharge & Rationale:   Coreg 6.25mg bid  Sodium bicarb 1300mg tid  Assessment & Plan  Toxic metabolic encephalopathy  History of atrial fibrillation hypertension alcohol liver disease who presented to the hospital for change in mental status found to have hyperglycemia    Admitted to ICU and stabilized  Altered mental status attributed to toxic metabolic encephalopathy secondary to hyperglycemia with HHNK, acute kidney injury, and acute liver failure  Clinically improved back to baseline:  awake, alert, oriented  Acute renal failure superimposed on stage 3 chronic kidney disease (HCC)  Patient presented with acute kidney injury with a creatinine of 1.99  History of CKD 3: baseline creatinine approximately 1.5-2.0  Appreciate nephrology evaluation and recommendations: Secondary to prerenal azotemia, diuresis, subsequent ATN  in the setting of acute liver failure  Creatinine worsened to 2.9  Status post IV fluids  Renal ultrasound: No hydronephrosis.  Increased echogenicity consistent with medical renal disease  Patient started on IV albumin and sodium bicarbonate infusion by nephrology  Urine sodium not consistent with hepatorenal syndrome  Creatinine appears to have plateaued 2.8-3.0, suspect this may be his new baseline  Discussed with  nephrology: Likely patient's new baseline, may be discharged home today on bicarbonate supplements  Close outpatient follow-up with nephrology      Results from last 7 days   Lab Units 06/06/25  0536 06/05/25  0502 06/04/25  0426 06/03/25  0448 06/02/25  0523 06/01/25  0525 05/31/25  0508   BUN mg/dL 41* 37* 36* 39* 37* 36* 30*   CREATININE mg/dL 3.01* 2.89* 2.91* 2.94* 2.76* 2.68* 2.67*   EGFR ml/min/1.73sq m 22 23 23 22 24 25 25     Abnormal LFTs  Evidence of worsening hyperbilirubinemia, elevated alkaline phosphatase and coagulopathy on admission  No significant transaminitis: Initially mild elevated AST  evaluated by gastroenterology.    Noted to have chronically elevated liver enzymes: Acute rise this admission was attributed to acute alcoholic hepatitis  Discriminant function did not meet criteria for steroids  Per GI: No stigmata of chronic liver disease on exam  Outpatient elastography versus liver biopsy for definitive diagnosis  Serologic workup negative for etiology of liver injury  Initial coagulopathy resolved with vitamin K  MRCP: Obstructing pancreatic head calculi.  Seen previously.  Stable main pancreatic ductal dilatation.  No intrahepatic biliary ductal dilatation.  Ruled out infection: Cultures no growth  Renal ultrasound: Moderate ascites  IR paracentesis 6/3: 1.7 L clear fluid  SAAG =2.5 c/w portal HTN  Spoke with GI: Dr. Esparza: Reviewed LFTs and elevated total bilirubin: No additional inpatient workup required.  He notes they have referred patient to outpatient hepatology clinic for follow-up    Results from last 7 days   Lab Units 06/06/25  0536 06/05/25  0502 06/04/25  0426 06/03/25  0448 06/02/25  0523 06/01/25  0525 05/31/25  0508   AST U/L 47* 56* 44* 49* 45* 37 35   ALT U/L 33 34 30 31 28 24 23   TOTAL BILIRUBIN mg/dL 9.49* 9.21* 8.85* 8.47* 8.63* 9.11* 10.99*     Results from last 7 days   Lab Units 06/04/25  0426 06/02/25  0523   INR  1.16 1.09     Other specified anemias  No evidence of  blood loss.  Transfused 1 unit PRBC 5/28, and again 5/30  EGD and colonoscopy February with dieulafoy lesion but no other sources of bleeding  Evaluated by GI: no current endoscopy recommended  Hemoglobin stabilized:  ranging 7-8    Results from last 7 days   Lab Units 06/05/25  0502 06/04/25  0426 06/02/25  0523 06/01/25  0525 05/31/25  0508   HEMOGLOBIN g/dL 7.8* 7.6* 8.3* 8.2* 8.9*     Primary hypertension  Initially presented with markedly elevated blood pressure secondary to noncompliance  Continue amlodipine 5 mg twice daily, hydralazine 25 mg 3 times daily and new Coreg 6.25 twice daily  Blood pressure adequately controlled  Type 2 diabetes mellitus with hyperglycemia, with long-term current use of insulin ([facility])  Lab Results   Component Value Date    HGBA1C 9.6 (H) 05/26/2025     Recent Labs     06/05/25  1616 06/05/25  2118 06/06/25  0758 06/06/25  1129   POCGLU 220* 121 225* 136     HHNK upon arrival likely related to noncompliance with medications and alcohol use  Restarted on 70/30  Increased to 8 units twice daily  Continue to monitor and titrate as needed  Alcoholic hepatitis with ascites  History of alcohol use with withdrawal and withdrawal seizures  Father reported active consumption of alcohol  Initially noted to have altered mental status, treated in the ICU with phenobarbital for possible alcohol withdrawal  Continue thiamine and folic acid  Case management following for rehab referrals  Initial discharge plan was for patient to be discharged to inpatient alcohol rehab: Patient however now refuses, at discharge.  He states he will return home where he lives with his mother and father: However states he will pursue complete alcohol cessation  History of atrial fibrillation  History of atrial fibrillation on metoprolol  Not on anticoagulation at baseline  Hyponatremia  Patient with hyponatremia  Reviewed previous records: Appears to have chronic, intermittent hyponatremia, suspect secondary to free  water excess in the setting of alcohol abuse  Appreciate nephrology evaluation and recommendations  Chronic pancreatitis (HCC)  Patient with a history of chronic pancreatitis secondary to alcohol  Initially presented with abdominal pain nausea/vomiting, since improved  MRCP with evidence of chronic pancreatitis and chronic obstructing pancreatic duct stone  Continue diet, and complete alcohol cessation  Continue proton pump inhibitor  Chronic bilateral low back pain with bilateral sciatica  Patient complains of low back pain across his bilateral lower back, radiating down both legs to his toes.  Denies any numbness.  States it has been present for many months  Patient had previous workups with x-rays of lumbar spine  X-ray lumbar spine: No acute abnormality  Continue ambulation  Continue Tylenol/Lidoderm/Voltaren/heating  Pain since resolved     Medical Problems       Resolved Problems  Date Reviewed: 6/6/2025   None           Consultations During Hospital Stay:  Nephrology: Dr. Messer/Will  GI: Dr. Ernst/Ricardo    Significant Findings / Test Results:   Imaging     6/3 MRCP  Significant image quality degradation secondary to motion artifact.  Redemonstrated findings of chronic pancreatitis with stable main pancreatic ductal dilatation secondary to obstructing pancreatic head calculi. No acute peripancreatic inflammatory changes to suggest acute pancreatitis. No focal intrapancreatic or   peripancreatic fluid collection.  Small bilateral pleural effusions     6/1 US kidney/bladder  No hydronephrosis.  Increased echogenicity of the renal parenchyma bilaterally suggestive of medical renal disease.  At least moderate abdominal and pelvic ascites.     6/1 Xray Lumbar spine  No acute osseous abnormality      5/26 chest x-rayLeft lower lobe atelectasis versus infiltrate      5/23 right upper quadrant ultrasound  Heterogeneous appearance of the pancreas, correlates with fatty replacement and calcifications seen on prior CT,  possibly sequela of chronic inflammation/chronic pancreatitis.  Liver appears grossly unremarkable.  Small volume hypoechoic ascites, and other findings as above.     5/23 CT head without contrast  No acute intracranial abnormality.      Microbiology   6/3: Body fluid culture: Pending  5/26 blood culture: Negative x 2        Procedure  6/3: IR paracentesis: 1700 cc fluid    Incidental Findings:   none    Hospital Course:   Wes Araujo is a 55 y.o. male patient who originally presented to the hospital on 5/23/2025 due to altered mental status and underwent a workup under the guidance of the nephrology and GI teams as described above.  Patient clinically improved however his lab work remained with transaminitis/hyperbilirubinemia, and elevated creatinine.  He was followed by the GI and nephrology teams.  His levels were felt to be his new baseline.  He will be discharged home with close outpatient follow-up.  Case was discussed with GI and they state he has been referrals for close follow-up in their hepatology clinic    Prior to discharge it was discussed specifically with patient that he needs to abstain completely from alcohol and he agreed.  Case management was arranging inpatient alcohol rehab which he is now refusing.  He lives at home with his mother and father and wishes to return there      Please see above list of diagnoses and related plan for additional information.     Discharge Day Visit / Exam:   Subjective: Patient is examined and interviewed by me in Malay at the bedside.  Denies any current pain anywhere.  Specifically denies any abdominal pain.  Denies any abdominal swelling.  Denies any nausea, vomiting, diarrhea.  Notes he is tolerating p.o.  Denies any chest pain.  Denies any shortness of breath or cough.  Denies any dizziness or lightheadedness.  States he has been ambulating around his room without any difficulty.  Denies any bleeding.  Denies feeling confused.  Patient states he does not  "wish to go to rehab and wishes to return home.  States he lives with his mother and father.  Patient states he will not drink any alcohol and states he understands he has liver damage from alcohol    Vitals: Blood Pressure: 134/65 (06/06/25 0735)  Pulse: 75 (06/06/25 0735)  Temperature: 98.5 °F (36.9 °C) (06/06/25 0735)  Temp Source: Oral (06/06/25 0735)  Respirations: 16 (06/06/25 0735)  Height: 5' 9\" (175.3 cm) (05/24/25 1055)  Weight - Scale: 61.2 kg (134 lb 14.7 oz) (06/06/25 0545)  SpO2: 97 % (06/06/25 0735)  Physical Exam   General: Pleasant male.  No acute distress.  Nontachypneic and nondyspneic.  Jaundice  Heart: Regular rate and rhythm.  S1-S2 present.  No murmur, rub, gallop  Lungs: Clear to auscultation bilaterally.  No wheezes, crackles, rhonchi.  No accessory muscle use or respiratory distress  Abdomen: Soft, nontender with palpation.  Nondistended.  Normal active bowel sounds present.  No guarding or rebound.  No peritoneal signs or mass  Extremities: No clubbing, cyanosis, edema.  2+ pedal pulses bilaterally  Neurologic: Awake.  Alert.  Fluent, goal-directed speech  Orientation: Oriented to year, month, day, city, state, president, situation  Strength: Bilateral upper extremities and bilateral lower extremities strength symmetrical and intact    Discussion with Family: Called patient's father: Left a message in English requesting to call me back or meet me here in patient's room for an update    Discharge instructions/Information to patient and family:   See after visit summary for information provided to patient and family.      Provisions for Follow-Up Care:  See after visit summary for information related to follow-up care and any pertinent home health orders.      Mobility at time of Discharge:   Basic Mobility Inpatient Raw Score: 24  JH-HLM Goal: 8: Walk 250 feet or more  JH-HLM Achieved: 8: Walk 250 feet ot more  HLM Goal achieved. Continue to encourage appropriate mobility.     Disposition: "   Home    Planned Readmission: no  Patient is extremely high risk for readmission    Administrative Statements   Discharge Statement:  I have spent a total time of 48 minutes in caring for this patient on the day of the visit/encounter. .    Discussed with patient's nurse  Discussed with   Discussed with nephrology: Dr. Messer: Patient may be discharged home  **Please Note: This note may have been constructed using a voice recognition system**

## 2025-06-06 NOTE — ASSESSMENT & PLAN NOTE
Continue oral sodium bicarbonate therapy  Although likely component respiratory alkalosis recent pH 7.4/26/16.2

## 2025-06-06 NOTE — ASSESSMENT & PLAN NOTE
No evidence of blood loss.  Transfused 1 unit PRBC 5/28, and again 5/30  EGD and colonoscopy February with dieulafoy lesion but no other sources of bleeding  Evaluated by GI: no current endoscopy recommended  Hemoglobin stabilized:  ranging 7-8    Results from last 7 days   Lab Units 06/05/25  0502 06/04/25  0426 06/02/25  0523 06/01/25  0525 05/31/25  0508   HEMOGLOBIN g/dL 7.8* 7.6* 8.3* 8.2* 8.9*

## 2025-06-06 NOTE — ASSESSMENT & PLAN NOTE
Patient presented with acute kidney injury with a creatinine of 1.99  History of CKD 3: baseline creatinine approximately 1.5-2.0  Appreciate nephrology evaluation and recommendations: Secondary to prerenal azotemia, diuresis, subsequent ATN  in the setting of acute liver failure  Creatinine worsened to 2.9  Status post IV fluids  Renal ultrasound: No hydronephrosis.  Increased echogenicity consistent with medical renal disease  Patient started on IV albumin and sodium bicarbonate infusion by nephrology  Urine sodium not consistent with hepatorenal syndrome  Creatinine appears to have plateaued 2.8-3.0, suspect this may be his new baseline  Discussed with nephrology: Likely patient's new baseline, may be discharged home today on bicarbonate supplements  Close outpatient follow-up with nephrology      Results from last 7 days   Lab Units 06/06/25  0536 06/05/25  0502 06/04/25  0426 06/03/25  0448 06/02/25  0523 06/01/25  0525 05/31/25  0508   BUN mg/dL 41* 37* 36* 39* 37* 36* 30*   CREATININE mg/dL 3.01* 2.89* 2.91* 2.94* 2.76* 2.68* 2.67*   EGFR ml/min/1.73sq m 22 23 23 22 24 25 25

## 2025-06-06 NOTE — PLAN OF CARE
Problem: PAIN - ADULT  Goal: Verbalizes/displays adequate comfort level or baseline comfort level  Description: Interventions:  - Encourage patient to monitor pain and request assistance  - Assess pain using appropriate pain scale  - Administer analgesics as ordered based on type and severity of pain and evaluate response  - Implement non-pharmacological measures as appropriate and evaluate response  - Consider cultural and social influences on pain and pain management  - Notify physician/advanced practitioner if interventions unsuccessful or patient reports new pain  - Educate patient/family on pain management process including their role and importance of  reporting pain   - Provide non-pharmacologic/complimentary pain relief interventions  Outcome: Progressing     Problem: INFECTION - ADULT  Goal: Absence or prevention of progression during hospitalization  Description: INTERVENTIONS:  - Assess and monitor for signs and symptoms of infection  - Monitor lab/diagnostic results  - Monitor all insertion sites, i.e. indwelling lines, tubes, and drains  - Monitor endotracheal if appropriate and nasal secretions for changes in amount and color  - [place] appropriate cooling/warming therapies per order  - Administer medications as ordered  - Instruct and encourage patient and family to use good hand hygiene technique  - Identify and instruct in appropriate isolation precautions for identified infection/condition  Outcome: Progressing  Goal: Absence of fever/infection during neutropenic period  Description: INTERVENTIONS:  - Monitor WBC  - Perform strict hand hygiene  - Limit to healthy visitors only  - No plants, dried, fresh or silk flowers with mccluer in patient room  Outcome: Progressing     Problem: SAFETY ADULT  Goal: Patient will remain free of falls  Description: INTERVENTIONS:  - Educate patient/family on patient safety including physical limitations  - Instruct patient to call for assistance with activity   -  Consider consulting OT/PT to assist with strengthening/mobility based on AM PAC & JH-HLM score  - Consult OT/PT to assist with strengthening/mobility   - Keep Call bell within reach  - Keep bed low and locked with side rails adjusted as appropriate  - Keep care items and personal belongings within reach  - Initiate and maintain comfort rounds  - Make Fall Risk Sign visible to staff  - Offer Toileting every 2 Hours, in advance of need  - Initiate/Maintain bed alarm  - Obtain necessary fall risk management equipment: alarms  - Apply yellow socks and bracelet for high fall risk patients  - Consider moving patient to room near nurses station  Outcome: Progressing  Goal: Maintain or return to baseline ADL function  Description: INTERVENTIONS:  -  Assess patient's ability to carry out ADLs; assess patient's baseline for ADL function and identify physical deficits which impact ability to perform ADLs (bathing, care of mouth/teeth, toileting, grooming, dressing, etc.)  - Assess/evaluate cause of self-care deficits   - Assess range of motion  - Assess patient's mobility; develop plan if impaired  - Assess patient's need for assistive devices and provide as appropriate  - Encourage maximum independence but intervene and supervise when necessary  - Involve family in performance of ADLs  - Assess for home care needs following discharge   - Consider OT consult to assist with ADL evaluation and planning for discharge  - Provide patient education as appropriate  - Monitor functional capacity and physical performance, use of AM PAC & JH-HLM   - Monitor gait, balance and fatigue with ambulation    Outcome: Progressing  Goal: Maintains/Returns to pre admission functional level  Description: INTERVENTIONS:  - Perform AM-PAC 6 Click Basic Mobility/ Daily Activity assessment daily.  - Set and communicate daily mobility goal to care team and patient/family/caregiver.   - Collaborate with rehabilitation services on mobility goals if  consulted  - Perform Range of Motion 4 times a day.  - Reposition patient every 2 hours.  - Dangle patient 3 times a day  - Stand patient 3 times a day  - Ambulate patient 3 times a day  - Out of bed to chair 3 times a day   - Out of bed for meals 3 times a day  - Out of bed for toileting  - Record patient progress and toleration of activity level   Outcome: Progressing     Problem: DISCHARGE PLANNING  Goal: Discharge to home or other facility with appropriate resources  Description: INTERVENTIONS:  - Identify barriers to discharge w/patient and caregiver  - Arrange for needed discharge resources and transportation as appropriate  - Identify discharge learning needs (meds, wound care, etc.)  - Arrange for interpretive services to assist at discharge as needed  - Refer to Case Management Department for coordinating discharge planning if the patient needs post-hospital services based on physician/advanced practitioner order or complex needs related to functional status, cognitive ability, or social support system  Outcome: Progressing     Problem: Knowledge Deficit  Goal: Patient/family/caregiver demonstrates understanding of disease process, treatment plan, medications, and discharge instructions  Description: Complete learning assessment and assess knowledge base.  Interventions:  - Provide teaching at level of understanding  - Provide teaching via preferred learning methods  Outcome: Progressing     Problem: GASTROINTESTINAL - ADULT  Goal: Minimal or absence of nausea and/or vomiting  Description: INTERVENTIONS:  - Administer IV fluids if ordered to ensure adequate hydration  - Maintain NPO status until nausea and vomiting are resolved  - Nasogastric tube if ordered  - Administer ordered antiemetic medications as needed  - Provide nonpharmacologic comfort measures as appropriate  - Advance diet as tolerated, if ordered  - Consider nutrition services referral to assist patient with adequate nutrition and appropriate  food choices  Outcome: Progressing  Goal: Maintains adequate nutritional intake  Description: INTERVENTIONS:  - Monitor percentage of each meal consumed  - Identify factors contributing to decreased intake, treat as appropriate  - Assist with meals as needed  - Monitor I&O, weight, and lab values if indicated  - Obtain nutrition services referral as needed  Outcome: Progressing     Problem: METABOLIC, FLUID AND ELECTROLYTES - ADULT  Goal: Electrolytes maintained within normal limits  Description: INTERVENTIONS:  - Monitor labs and assess patient for signs and symptoms of electrolyte imbalances  - Administer electrolyte replacement as ordered  - Monitor response to electrolyte replacements, including repeat lab results as appropriate  - Instruct patient on fluid and nutrition as appropriate  Outcome: Progressing  Goal: Fluid balance maintained  Description: INTERVENTIONS:  - Monitor labs   - Monitor I/O and WT  - Instruct patient on fluid and nutrition as appropriate  - Assess for signs & symptoms of volume excess or deficit  Outcome: Progressing  Goal: Glucose maintained within target range  Description: INTERVENTIONS:  - Monitor Blood Glucose as ordered  - Assess for signs and symptoms of hyperglycemia and hypoglycemia  - Administer ordered medications to maintain glucose within target range  - Assess nutritional intake and initiate nutrition service referral as needed  Outcome: Progressing     Problem: SKIN/TISSUE INTEGRITY - ADULT  Goal: Skin Integrity remains intact(Skin Breakdown Prevention)  Description: Assess:  -Perform Jose assessment every shift  -Clean and moisturize skin every shift  -Inspect skin when repositioning, toileting, and assisting with ADLS  -Assess under medical devices such as IV every shift  -Assess extremities for adequate circulation and sensation     Bed Management:  -Have minimal linens on bed & keep smooth, unwrinkled  -Change linens as needed when moist or perspiring  -Avoid sitting  or lying in one position for more than 2 hours while in bed  -Keep HOB at 30 degrees     Toileting:  -Offer bedside commode  -Assess for incontinence every shift  -Use incontinent care products after each incontinent episode such as foam cleanser    Activity:  -Mobilize patient 3 times a day  -Encourage activity and walks on unit  -Encourage or provide ROM exercises   -Turn and reposition patient every 2 Hours  -Use appropriate equipment to lift or move patient in bed  -Instruct/ Assist with weight shifting every hour when out of bed in chair  -Consider limitation of chair time 1 hour intervals    Skin Care:  -Avoid use of baby powder, tape, friction and shearing, hot water or constrictive clothing  -Relieve pressure over bony prominences using pillows  -Do not massage red bony areas    Next Steps:  -Teach patient strategies to minimize risks such as skin breakdown   -Consider consults to  interdisciplinary teams such as wound care  Outcome: Progressing     Problem: HEMATOLOGIC - ADULT  Goal: Maintains hematologic stability  Description: INTERVENTIONS  - Assess for signs and symptoms of bleeding or hemorrhage  - Monitor labs  - Administer supportive blood products/factors as ordered and appropriate  Outcome: Progressing     Problem: MUSCULOSKELETAL - ADULT  Goal: Maintain or return mobility to safest level of function  Description: INTERVENTIONS:  - Assess patient's ability to carry out ADLs; assess patient's baseline for ADL function and identify physical deficits which impact ability to perform ADLs (bathing, care of mouth/teeth, toileting, grooming, dressing, etc.)  - Assess/evaluate cause of self-care deficits   - Assess range of motion  - Assess patient's mobility  - Assess patient's need for assistive devices and provide as appropriate  - Encourage maximum independence but intervene and supervise when necessary  - Involve family in performance of ADLs  - Assess for home care needs following discharge   -  Consider OT consult to assist with ADL evaluation and planning for discharge  - Provide patient education as appropriate  Outcome: Progressing  Goal: Maintain proper alignment of affected body part  Description: INTERVENTIONS:  - Support, maintain and protect limb and body alignment  - Provide patient/ family with appropriate education  Outcome: Progressing     Problem: Prexisting or High Potential for Compromised Skin Integrity  Goal: Skin integrity is maintained or improved  Description: INTERVENTIONS:  - Identify patients at risk for skin breakdown  - Assess and monitor skin integrity including under and around medical devices   - Assess and monitor nutrition and hydration status  - Monitor labs  - Assess for incontinence   - Turn and reposition patient  - Assist with mobility/ambulation  - Relieve pressure over keith prominences   - Avoid friction and shearing  - Provide appropriate hygiene as needed including keeping skin clean and dry  - Evaluate need for skin moisturizer/barrier cream  - Collaborate with interdisciplinary team  - Patient/family teaching  - Consider wound care consult    Assess:  - Review Jose scale daily  - Clean and moisturize skin every shift  - Inspect skin when repositioning, toileting, and assisting with ADLS  - Assess under medical devices such as IV every shift  - Assess extremities for adequate circulation and sensation     Bed Management:  - Have minimal linens on bed & keep smooth, unwrinkled  - Change linens as needed when moist or perspiring  - Avoid sitting or lying in one position for more than 2 hours while in bed?Keep HOB at 30 degrees   - Toileting:  - Offer bedside commode  - Assess for incontinence every shift  - Use incontinent care products after each incontinent episode such as foam cleanser    Activity:  - Mobilize patient 3 times a day  - Encourage activity and walks on unit  - Encourage or provide ROM exercises   - Turn and reposition patient every 2 Hours  - Use  appropriate equipment to lift or move patient in bed  - Instruct/ Assist with weight shifting every hour when out of bed in chair  - Consider limitation of chair time 12 hour intervals    Skin Care:  - Avoid use of baby powder, tape, friction and shearing, hot water or constrictive clothing  - Relieve pressure over bony prominences using pillows  - Do not massage red bony areas    Next Steps:  - Teach patient strategies to minimize risks such as skin breakdown  - Consider consults to  interdisciplinary teams such as wound care  Outcome: Progressing     Problem: MOBILITY - ADULT  Goal: Maintain or return to baseline ADL function  Description: INTERVENTIONS:  -  Assess patient's ability to carry out ADLs; assess patient's baseline for ADL function and identify physical deficits which impact ability to perform ADLs (bathing, care of mouth/teeth, toileting, grooming, dressing, etc.)  - Assess/evaluate cause of self-care deficits   - Assess range of motion  - Assess patient's mobility; develop plan if impaired  - Assess patient's need for assistive devices and provide as appropriate  - Encourage maximum independence but intervene and supervise when necessary  - Involve family in performance of ADLs  - Assess for home care needs following discharge   - Consider OT consult to assist with ADL evaluation and planning for discharge  - Provide patient education as appropriate  - Monitor functional capacity and physical performance, use of AM PAC & JH-HLM   - Monitor gait, balance and fatigue with ambulation    Outcome: Progressing  Goal: Maintains/Returns to pre admission functional level  Description: INTERVENTIONS:  - Perform AM-PAC 6 Click Basic Mobility/ Daily Activity assessment daily.  - Set and communicate daily mobility goal to care team and patient/family/caregiver.   - Collaborate with rehabilitation services on mobility goals if consulted  - Perform Range of Motion 4 times a day.  - Reposition patient every 2  hours.  - Dangle patient 3 times a day  - Stand patient 3 times a day  - Ambulate patient 3 times a day  - Out of bed to chair 3 times a day   - Out of bed for meals 3 times a day  - Out of bed for toileting  - Record patient progress and toleration of activity level   Outcome: Progressing

## 2025-06-06 NOTE — ASSESSMENT & PLAN NOTE
Evidence of worsening hyperbilirubinemia, elevated alkaline phosphatase and coagulopathy on admission  No significant transaminitis: Initially mild elevated AST  evaluated by gastroenterology.    Noted to have chronically elevated liver enzymes: Acute rise this admission was attributed to acute alcoholic hepatitis  Discriminant function did not meet criteria for steroids  Per GI: No stigmata of chronic liver disease on exam  Outpatient elastography versus liver biopsy for definitive diagnosis  Serologic workup negative for etiology of liver injury  Initial coagulopathy resolved with vitamin K  MRCP: Obstructing pancreatic head calculi.  Seen previously.  Stable main pancreatic ductal dilatation.  No intrahepatic biliary ductal dilatation.  Ruled out infection: Cultures no growth  Renal ultrasound: Moderate ascites  IR paracentesis 6/3: 1.7 L clear fluid  SAAG =2.5 c/w portal HTN  Spoke with GI: Dr. Esparza: Reviewed LFTs and elevated total bilirubin: No additional inpatient workup required.  He notes they have referred patient to outpatient hepatology clinic for follow-up    Results from last 7 days   Lab Units 06/06/25  0536 06/05/25  0502 06/04/25  0426 06/03/25  0448 06/02/25  0523 06/01/25  0525 05/31/25  0508   AST U/L 47* 56* 44* 49* 45* 37 35   ALT U/L 33 34 30 31 28 24 23   TOTAL BILIRUBIN mg/dL 9.49* 9.21* 8.85* 8.47* 8.63* 9.11* 10.99*     Results from last 7 days   Lab Units 06/04/25  0426 06/02/25  0523   INR  1.16 1.09

## 2025-06-06 NOTE — ASSESSMENT & PLAN NOTE
Lab Results   Component Value Date    HGBA1C 9.6 (H) 05/26/2025     Recent Labs     06/05/25  1616 06/05/25  2118 06/06/25  0758 06/06/25  1129   POCGLU 220* 121 225* 136     HHNK upon arrival likely related to noncompliance with medications and alcohol use  Restarted on 70/30  Increased to 8 units twice daily  Continue to monitor and titrate as needed

## 2025-06-06 NOTE — PLAN OF CARE
Problem: PAIN - ADULT  Goal: Verbalizes/displays adequate comfort level or baseline comfort level  Description: Interventions:  - Encourage patient to monitor pain and request assistance  - Assess pain using appropriate pain scale  - Administer analgesics as ordered based on type and severity of pain and evaluate response  - Implement non-pharmacological measures as appropriate and evaluate response  - Consider cultural and social influences on pain and pain management  - Notify physician/advanced practitioner if interventions unsuccessful or patient reports new pain  - Educate patient/family on pain management process including their role and importance of  reporting pain   - Provide non-pharmacologic/complimentary pain relief interventions  Outcome: Progressing     Problem: INFECTION - ADULT  Goal: Absence or prevention of progression during hospitalization  Description: INTERVENTIONS:  - Assess and monitor for signs and symptoms of infection  - Monitor lab/diagnostic results  - Monitor all insertion sites, i.e. indwelling lines, tubes, and drains  - Monitor endotracheal if appropriate and nasal secretions for changes in amount and color  - Everetts appropriate cooling/warming therapies per order  - Administer medications as ordered  - Instruct and encourage patient and family to use good hand hygiene technique  - Identify and instruct in appropriate isolation precautions for identified infection/condition  Outcome: Progressing  Goal: Absence of fever/infection during neutropenic period  Description: INTERVENTIONS:  - Monitor WBC  - Perform strict hand hygiene  - Limit to healthy visitors only  - No plants, dried, fresh or silk flowers with mcclure in patient room  Outcome: Progressing     Problem: SAFETY ADULT  Goal: Patient will remain free of falls  Description: INTERVENTIONS:  - Educate patient/family on patient safety including physical limitations  - Instruct patient to call for assistance with activity   -  Consider consulting OT/PT to assist with strengthening/mobility based on AM PAC & JH-HLM score  - Consult OT/PT to assist with strengthening/mobility   - Keep Call bell within reach  - Keep bed low and locked with side rails adjusted as appropriate  - Keep care items and personal belongings within reach  - Initiate and maintain comfort rounds  - Make Fall Risk Sign visible to staff  - Offer Toileting every 2 Hours, in advance of need  - Initiate/Maintain bed alarm  - Obtain necessary fall risk management equipment: alarms  - Apply yellow socks and bracelet for high fall risk patients  - Consider moving patient to room near nurses station  Outcome: Progressing  Goal: Maintain or return to baseline ADL function  Description: INTERVENTIONS:  -  Assess patient's ability to carry out ADLs; assess patient's baseline for ADL function and identify physical deficits which impact ability to perform ADLs (bathing, care of mouth/teeth, toileting, grooming, dressing, etc.)  - Assess/evaluate cause of self-care deficits   - Assess range of motion  - Assess patient's mobility; develop plan if impaired  - Assess patient's need for assistive devices and provide as appropriate  - Encourage maximum independence but intervene and supervise when necessary  - Involve family in performance of ADLs  - Assess for home care needs following discharge   - Consider OT consult to assist with ADL evaluation and planning for discharge  - Provide patient education as appropriate  - Monitor functional capacity and physical performance, use of AM PAC & JH-HLM   - Monitor gait, balance and fatigue with ambulation    Outcome: Progressing  Goal: Maintains/Returns to pre admission functional level  Description: INTERVENTIONS:  - Perform AM-PAC 6 Click Basic Mobility/ Daily Activity assessment daily.  - Set and communicate daily mobility goal to care team and patient/family/caregiver.   - Collaborate with rehabilitation services on mobility goals if  consulted  - Perform Range of Motion 4 times a day.  - Reposition patient every 2 hours.  - Dangle patient 3 times a day  - Stand patient 3 times a day  - Ambulate patient 3 times a day  - Out of bed to chair 3 times a day   - Out of bed for meals 3 times a day  - Out of bed for toileting  - Record patient progress and toleration of activity level   Outcome: Progressing     Problem: DISCHARGE PLANNING  Goal: Discharge to home or other facility with appropriate resources  Description: INTERVENTIONS:  - Identify barriers to discharge w/patient and caregiver  - Arrange for needed discharge resources and transportation as appropriate  - Identify discharge learning needs (meds, wound care, etc.)  - Arrange for interpretive services to assist at discharge as needed  - Refer to Case Management Department for coordinating discharge planning if the patient needs post-hospital services based on physician/advanced practitioner order or complex needs related to functional status, cognitive ability, or social support system  Outcome: Progressing     Problem: Knowledge Deficit  Goal: Patient/family/caregiver demonstrates understanding of disease process, treatment plan, medications, and discharge instructions  Description: Complete learning assessment and assess knowledge base.  Interventions:  - Provide teaching at level of understanding  - Provide teaching via preferred learning methods  Outcome: Progressing     Problem: GASTROINTESTINAL - ADULT  Goal: Minimal or absence of nausea and/or vomiting  Description: INTERVENTIONS:  - Administer IV fluids if ordered to ensure adequate hydration  - Maintain NPO status until nausea and vomiting are resolved  - Nasogastric tube if ordered  - Administer ordered antiemetic medications as needed  - Provide nonpharmacologic comfort measures as appropriate  - Advance diet as tolerated, if ordered  - Consider nutrition services referral to assist patient with adequate nutrition and appropriate  food choices  Outcome: Progressing  Goal: Maintains adequate nutritional intake  Description: INTERVENTIONS:  - Monitor percentage of each meal consumed  - Identify factors contributing to decreased intake, treat as appropriate  - Assist with meals as needed  - Monitor I&O, weight, and lab values if indicated  - Obtain nutrition services referral as needed  Outcome: Progressing     Problem: METABOLIC, FLUID AND ELECTROLYTES - ADULT  Goal: Electrolytes maintained within normal limits  Description: INTERVENTIONS:  - Monitor labs and assess patient for signs and symptoms of electrolyte imbalances  - Administer electrolyte replacement as ordered  - Monitor response to electrolyte replacements, including repeat lab results as appropriate  - Instruct patient on fluid and nutrition as appropriate  Outcome: Progressing  Goal: Fluid balance maintained  Description: INTERVENTIONS:  - Monitor labs   - Monitor I/O and WT  - Instruct patient on fluid and nutrition as appropriate  - Assess for signs & symptoms of volume excess or deficit  Outcome: Progressing  Goal: Glucose maintained within target range  Description: INTERVENTIONS:  - Monitor Blood Glucose as ordered  - Assess for signs and symptoms of hyperglycemia and hypoglycemia  - Administer ordered medications to maintain glucose within target range  - Assess nutritional intake and initiate nutrition service referral as needed  Outcome: Progressing     Problem: SKIN/TISSUE INTEGRITY - ADULT  Goal: Skin Integrity remains intact(Skin Breakdown Prevention)  Description: Assess:  -Perform Jose assessment every shift  -Clean and moisturize skin every shift  -Inspect skin when repositioning, toileting, and assisting with ADLS  -Assess under medical devices such as IV every shift  -Assess extremities for adequate circulation and sensation     Bed Management:  -Have minimal linens on bed & keep smooth, unwrinkled  -Change linens as needed when moist or perspiring  -Avoid sitting  or lying in one position for more than 2 hours while in bed  -Keep HOB at 30 degrees     Toileting:  -Offer bedside commode  -Assess for incontinence every shift  -Use incontinent care products after each incontinent episode such as foam cleanser    Activity:  -Mobilize patient 3 times a day  -Encourage activity and walks on unit  -Encourage or provide ROM exercises   -Turn and reposition patient every 2 Hours  -Use appropriate equipment to lift or move patient in bed  -Instruct/ Assist with weight shifting every hour when out of bed in chair  -Consider limitation of chair time 1 hour intervals    Skin Care:  -Avoid use of baby powder, tape, friction and shearing, hot water or constrictive clothing  -Relieve pressure over bony prominences using pillows  -Do not massage red bony areas    Next Steps:  -Teach patient strategies to minimize risks such as skin breakdown   -Consider consults to  interdisciplinary teams such as wound care  Outcome: Progressing     Problem: HEMATOLOGIC - ADULT  Goal: Maintains hematologic stability  Description: INTERVENTIONS  - Assess for signs and symptoms of bleeding or hemorrhage  - Monitor labs  - Administer supportive blood products/factors as ordered and appropriate  Outcome: Progressing     Problem: MUSCULOSKELETAL - ADULT  Goal: Maintain or return mobility to safest level of function  Description: INTERVENTIONS:  - Assess patient's ability to carry out ADLs; assess patient's baseline for ADL function and identify physical deficits which impact ability to perform ADLs (bathing, care of mouth/teeth, toileting, grooming, dressing, etc.)  - Assess/evaluate cause of self-care deficits   - Assess range of motion  - Assess patient's mobility  - Assess patient's need for assistive devices and provide as appropriate  - Encourage maximum independence but intervene and supervise when necessary  - Involve family in performance of ADLs  - Assess for home care needs following discharge   -  Consider OT consult to assist with ADL evaluation and planning for discharge  - Provide patient education as appropriate  Outcome: Progressing  Goal: Maintain proper alignment of affected body part  Description: INTERVENTIONS:  - Support, maintain and protect limb and body alignment  - Provide patient/ family with appropriate education  Outcome: Progressing     Problem: Prexisting or High Potential for Compromised Skin Integrity  Goal: Skin integrity is maintained or improved  Description: INTERVENTIONS:  - Identify patients at risk for skin breakdown  - Assess and monitor skin integrity including under and around medical devices   - Assess and monitor nutrition and hydration status  - Monitor labs  - Assess for incontinence   - Turn and reposition patient  - Assist with mobility/ambulation  - Relieve pressure over keith prominences   - Avoid friction and shearing  - Provide appropriate hygiene as needed including keeping skin clean and dry  - Evaluate need for skin moisturizer/barrier cream  - Collaborate with interdisciplinary team  - Patient/family teaching  - Consider wound care consult    Assess:  - Review Jose scale daily  - Clean and moisturize skin every shift  - Inspect skin when repositioning, toileting, and assisting with ADLS  - Assess under medical devices such as IV every shift  - Assess extremities for adequate circulation and sensation     Bed Management:  - Have minimal linens on bed & keep smooth, unwrinkled  - Change linens as needed when moist or perspiring  - Avoid sitting or lying in one position for more than 2 hours while in bed?Keep HOB at 30 degrees   - Toileting:  - Offer bedside commode  - Assess for incontinence every shift  - Use incontinent care products after each incontinent episode such as foam cleanser    Activity:  - Mobilize patient 3 times a day  - Encourage activity and walks on unit  - Encourage or provide ROM exercises   - Turn and reposition patient every 2 Hours  - Use  appropriate equipment to lift or move patient in bed  - Instruct/ Assist with weight shifting every hour when out of bed in chair  - Consider limitation of chair time 12 hour intervals    Skin Care:  - Avoid use of baby powder, tape, friction and shearing, hot water or constrictive clothing  - Relieve pressure over bony prominences using pillows  - Do not massage red bony areas    Next Steps:  - Teach patient strategies to minimize risks such as skin breakdown  - Consider consults to  interdisciplinary teams such as wound care  Outcome: Progressing     Problem: MOBILITY - ADULT  Goal: Maintain or return to baseline ADL function  Description: INTERVENTIONS:  -  Assess patient's ability to carry out ADLs; assess patient's baseline for ADL function and identify physical deficits which impact ability to perform ADLs (bathing, care of mouth/teeth, toileting, grooming, dressing, etc.)  - Assess/evaluate cause of self-care deficits   - Assess range of motion  - Assess patient's mobility; develop plan if impaired  - Assess patient's need for assistive devices and provide as appropriate  - Encourage maximum independence but intervene and supervise when necessary  - Involve family in performance of ADLs  - Assess for home care needs following discharge   - Consider OT consult to assist with ADL evaluation and planning for discharge  - Provide patient education as appropriate  - Monitor functional capacity and physical performance, use of AM PAC & JH-HLM   - Monitor gait, balance and fatigue with ambulation    Outcome: Progressing  Goal: Maintains/Returns to pre admission functional level  Description: INTERVENTIONS:  - Perform AM-PAC 6 Click Basic Mobility/ Daily Activity assessment daily.  - Set and communicate daily mobility goal to care team and patient/family/caregiver.   - Collaborate with rehabilitation services on mobility goals if consulted  - Perform Range of Motion 4 times a day.  - Reposition patient every 2  hours.  - Dangle patient 3 times a day  - Stand patient 3 times a day  - Ambulate patient 3 times a day  - Out of bed to chair 3 times a day   - Out of bed for meals 3 times a day  - Out of bed for toileting  - Record patient progress and toleration of activity level   Outcome: Progressing

## 2025-06-06 NOTE — ASSESSMENT & PLAN NOTE
History of atrial fibrillation hypertension alcohol liver disease who presented to the hospital for change in mental status found to have hyperglycemia    Admitted to ICU and stabilized  Altered mental status attributed to toxic metabolic encephalopathy secondary to hyperglycemia with HHNK, acute kidney injury, and acute liver failure  Clinically improved back to baseline:  awake, alert, oriented

## 2025-06-06 NOTE — DISCHARGE INSTR - AVS FIRST PAGE
======================================================  Instrucciones      Tiene nida en roberts higado debido al alcohol.    No puede beber ningun de alcohol !!!!    Tiene nida en los rinones    [place] la pastilla para la presion arterial    Pedro brad jaime para mario al medico del higado y al medicco del rinon en brad semana    Regrese al ER si tiene algun problema, especialmente fiebre, escalofrios, dolor, hinchazon, dificultad para respirar o cualquier otro problema    Concierte brad jaime con roberts medico de mami dentro de brad semana para un chequeo    =================================================================

## 2025-06-09 ENCOUNTER — TELEPHONE (OUTPATIENT)
Age: 55
End: 2025-06-09

## 2025-06-09 ENCOUNTER — TELEPHONE (OUTPATIENT)
Dept: NEPHROLOGY | Facility: CLINIC | Age: 55
End: 2025-06-09

## 2025-06-09 ENCOUNTER — TRANSITIONAL CARE MANAGEMENT (OUTPATIENT)
Dept: FAMILY MEDICINE CLINIC | Facility: CLINIC | Age: 55
End: 2025-06-09

## 2025-06-09 DIAGNOSIS — N18.32 CKD STAGE 3B, GFR 30-44 ML/MIN (HCC): ICD-10-CM

## 2025-06-09 DIAGNOSIS — E87.8 ELECTROLYTE ABNORMALITY: ICD-10-CM

## 2025-06-09 DIAGNOSIS — N17.9 ACUTE RENAL FAILURE SUPERIMPOSED ON STAGE 3 CHRONIC KIDNEY DISEASE, UNSPECIFIED ACUTE RENAL FAILURE TYPE, UNSPECIFIED WHETHER STAGE 3A OR 3B CKD (HCC): ICD-10-CM

## 2025-06-09 DIAGNOSIS — N18.31 CKD STAGE 3A, GFR 45-59 ML/MIN (HCC): ICD-10-CM

## 2025-06-09 DIAGNOSIS — N18.30 ACUTE RENAL FAILURE SUPERIMPOSED ON STAGE 3 CHRONIC KIDNEY DISEASE, UNSPECIFIED ACUTE RENAL FAILURE TYPE, UNSPECIFIED WHETHER STAGE 3A OR 3B CKD (HCC): ICD-10-CM

## 2025-06-09 DIAGNOSIS — E87.5 HYPERKALEMIA: ICD-10-CM

## 2025-06-09 DIAGNOSIS — E87.20 METABOLIC ACIDOSIS: ICD-10-CM

## 2025-06-09 DIAGNOSIS — N17.9 AKI (ACUTE KIDNEY INJURY) (HCC): ICD-10-CM

## 2025-06-09 DIAGNOSIS — N17.9 ACUTE KIDNEY INJURY (HCC): ICD-10-CM

## 2025-06-09 DIAGNOSIS — I10 PRIMARY HYPERTENSION: Primary | ICD-10-CM

## 2025-06-09 PROCEDURE — 88341 IMHCHEM/IMCYTCHM EA ADD ANTB: CPT | Performed by: STUDENT IN AN ORGANIZED HEALTH CARE EDUCATION/TRAINING PROGRAM

## 2025-06-09 PROCEDURE — 88305 TISSUE EXAM BY PATHOLOGIST: CPT | Performed by: STUDENT IN AN ORGANIZED HEALTH CARE EDUCATION/TRAINING PROGRAM

## 2025-06-09 PROCEDURE — 88342 IMHCHEM/IMCYTCHM 1ST ANTB: CPT | Performed by: STUDENT IN AN ORGANIZED HEALTH CARE EDUCATION/TRAINING PROGRAM

## 2025-06-09 PROCEDURE — 88112 CYTOPATH CELL ENHANCE TECH: CPT | Performed by: STUDENT IN AN ORGANIZED HEALTH CARE EDUCATION/TRAINING PROGRAM

## 2025-06-09 NOTE — TELEPHONE ENCOUNTER
Spoke directly with Pt's father, Wes Araujo, via phone call @ [phone] with assistance from  #[id number].   was interrupted several times during said phone call then was hung up on abruptly after giving Hepatology office phone number for Pt to call back office.

## 2025-06-09 NOTE — TELEPHONE ENCOUNTER
----- Message from Vishnu Messer DO sent at 6/6/2025  5:03 PM EDT -----  Patient will need repeat BMPs weekly x 4.  Will need close follow-up as an outpatient given advanced CKD.  Thank you.

## 2025-06-09 NOTE — UTILIZATION REVIEW
NOTIFICATION OF ADMISSION DISCHARGE   This is a Notification of Discharge from [facility]. Please be advised that this patient has been discharge from our facility. Below you will find the admission and discharge date and time including the patient’s disposition.   UTILIZATION REVIEW CONTACT:  Utilization Review Assistants  Network Utilization Review Department  Phone: [phone] x carefully listen to the prompts. All voicemails are confidential.  Email: [email or web address]     ADMISSION INFORMATION  PRESENTATION DATE: 5/23/2025  5:56 PM  OBERVATION ADMISSION DATE: N/A  INPATIENT ADMISSION DATE: 5/23/25  8:40 PM   DISCHARGE DATE: 6/6/2025  4:15 PM   DISPOSITION:Home/Self Care    Network Utilization Review Department  ATTENTION: Please call with any questions or concerns to [phone] and carefully listen to the prompts so that you are directed to the right person. All voicemails are confidential.   For Discharge needs, contact Care Management DC Support Team at [phone] opt. 2  Send all requests for admission clinical reviews, approved or denied determinations and any other requests to dedicated fax number below belonging to the campus where the patient is receiving treatment. List of dedicated fax numbers for the Facilities:  FACILITY NAME UR FAX NUMBER   ADMISSION DENIALS (Administrative/Medical Necessity) [phone]   DISCHARGE SUPPORT TEAM ([facility]) [phone]   PARENT CHILD HEALTH (Maternity/NICU/Pediatrics) [phone]   [facility] [phone]   [facility] [phone]   [facility] [phone]   [facility] [phone]   [facility] [phone]   [facility] [phone]   [facility] [phone]   [facility] [phone]   [facility]  [facility] [phone]   [facility] [phone]   [facility] [phone]   [facility] [phone]

## 2025-06-09 NOTE — TELEPHONE ENCOUNTER
----- Message from Reji Esparza MD sent at 6/3/2025 11:27 AM EDT -----  Regarding: f/u appt  Hey can we schedule a f/u for this nazia? 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 Thx

## 2025-06-10 NOTE — TELEPHONE ENCOUNTER
Called pt to schedule a hospital follow up and pt's mother hung up or phone got disconnected. Tried to call back and LVM to return call if pt would like to schedule a hospital follow up. 2x attempts.

## 2025-06-11 NOTE — TELEPHONE ENCOUNTER
Called and spoke to pt regarding scheduling a hospital follow up. Pt scheduled to see Dr. Messer on 7/7/25 in the [facility]. Pt aware of labs. Mailed appt reminder card and lab work to pt's home.

## 2025-06-12 ENCOUNTER — APPOINTMENT (EMERGENCY)
Dept: CT IMAGING | Facility: HOSPITAL | Age: 55
DRG: 280 | End: 2025-06-12
Payer: COMMERCIAL

## 2025-06-12 ENCOUNTER — APPOINTMENT (EMERGENCY)
Dept: RADIOLOGY | Facility: HOSPITAL | Age: 55
DRG: 280 | End: 2025-06-12
Payer: COMMERCIAL

## 2025-06-12 ENCOUNTER — HOSPITAL ENCOUNTER (INPATIENT)
Facility: HOSPITAL | Age: 55
LOS: 8 days | Discharge: LEFT BEFORE DISCHARGE | DRG: 280 | End: 2025-06-20
Attending: EMERGENCY MEDICINE
Payer: COMMERCIAL

## 2025-06-12 DIAGNOSIS — N17.9 ACUTE KIDNEY INJURY (HCC): ICD-10-CM

## 2025-06-12 DIAGNOSIS — K70.11 ALCOHOLIC HEPATITIS WITH ASCITES: ICD-10-CM

## 2025-06-12 DIAGNOSIS — K92.1 MELENA: ICD-10-CM

## 2025-06-12 DIAGNOSIS — R18.8 ASCITES: Primary | ICD-10-CM

## 2025-06-12 DIAGNOSIS — R17 JAUNDICE: ICD-10-CM

## 2025-06-12 DIAGNOSIS — K92.0 COFFEE GROUND EMESIS: ICD-10-CM

## 2025-06-12 LAB
ALBUMIN SERPL BCG-MCNC: 3.4 G/DL (ref 3.5–5)
ALP SERPL-CCNC: 348 U/L (ref 34–104)
ALT SERPL W P-5'-P-CCNC: 36 U/L (ref 7–52)
AMMONIA PLAS-SCNC: 47 UMOL/L (ref 18–72)
ANION GAP SERPL CALCULATED.3IONS-SCNC: 5 MMOL/L (ref 4–13)
APTT PPP: 28 SECONDS (ref 23–34)
AST SERPL W P-5'-P-CCNC: 54 U/L (ref 13–39)
BASOPHILS # BLD AUTO: 0.05 THOUSANDS/ÂΜL (ref 0–0.1)
BASOPHILS NFR BLD AUTO: 1 % (ref 0–1)
BILIRUB SERPL-MCNC: 13.98 MG/DL (ref 0.2–1)
BUN SERPL-MCNC: 29 MG/DL (ref 5–25)
CALCIUM ALBUM COR SERPL-MCNC: 9.1 MG/DL (ref 8.3–10.1)
CALCIUM SERPL-MCNC: 8.6 MG/DL (ref 8.4–10.2)
CHLORIDE SERPL-SCNC: 108 MMOL/L (ref 96–108)
CO2 SERPL-SCNC: 17 MMOL/L (ref 21–32)
CREAT SERPL-MCNC: 2.43 MG/DL (ref 0.6–1.3)
EOSINOPHIL # BLD AUTO: 0.44 THOUSAND/ÂΜL (ref 0–0.61)
EOSINOPHIL NFR BLD AUTO: 4 % (ref 0–6)
ERYTHROCYTE [DISTWIDTH] IN BLOOD BY AUTOMATED COUNT: 16.7 % (ref 11.6–15.1)
ETHANOL SERPL-MCNC: <10 MG/DL
GFR SERPL CREATININE-BSD FRML MDRD: 28 ML/MIN/1.73SQ M
GLUCOSE SERPL-MCNC: 315 MG/DL (ref 65–140)
HCT VFR BLD AUTO: 24.3 % (ref 36.5–49.3)
HGB BLD-MCNC: 8.3 G/DL (ref 12–17)
IMM GRANULOCYTES # BLD AUTO: 0.07 THOUSAND/UL (ref 0–0.2)
IMM GRANULOCYTES NFR BLD AUTO: 1 % (ref 0–2)
INR PPP: 1.18 (ref 0.85–1.19)
LIPASE SERPL-CCNC: <6 U/L (ref 11–82)
LYMPHOCYTES # BLD AUTO: 1.35 THOUSANDS/ÂΜL (ref 0.6–4.47)
LYMPHOCYTES NFR BLD AUTO: 13 % (ref 14–44)
MCH RBC QN AUTO: 32 PG (ref 26.8–34.3)
MCHC RBC AUTO-ENTMCNC: 34.2 G/DL (ref 31.4–37.4)
MCV RBC AUTO: 94 FL (ref 82–98)
MONOCYTES # BLD AUTO: 0.97 THOUSAND/ÂΜL (ref 0.17–1.22)
MONOCYTES NFR BLD AUTO: 9 % (ref 4–12)
NEUTROPHILS # BLD AUTO: 7.46 THOUSANDS/ÂΜL (ref 1.85–7.62)
NEUTS SEG NFR BLD AUTO: 72 % (ref 43–75)
NRBC BLD AUTO-RTO: 0 /100 WBCS
PLATELET # BLD AUTO: 245 THOUSANDS/UL (ref 149–390)
PMV BLD AUTO: 12.2 FL (ref 8.9–12.7)
POTASSIUM SERPL-SCNC: 4.7 MMOL/L (ref 3.5–5.3)
POTASSIUM SERPL-SCNC: 6.7 MMOL/L (ref 3.5–5.3)
PROT SERPL-MCNC: 5.5 G/DL (ref 6.4–8.4)
PROTHROMBIN TIME: 15.2 SECONDS (ref 12.3–15)
RBC # BLD AUTO: 2.59 MILLION/UL (ref 3.88–5.62)
SODIUM SERPL-SCNC: 130 MMOL/L (ref 135–147)
WBC # BLD AUTO: 10.34 THOUSAND/UL (ref 4.31–10.16)

## 2025-06-12 PROCEDURE — 85025 COMPLETE CBC W/AUTO DIFF WBC: CPT | Performed by: EMERGENCY MEDICINE

## 2025-06-12 PROCEDURE — 99285 EMERGENCY DEPT VISIT HI MDM: CPT

## 2025-06-12 PROCEDURE — 36415 COLL VENOUS BLD VENIPUNCTURE: CPT | Performed by: EMERGENCY MEDICINE

## 2025-06-12 PROCEDURE — 82140 ASSAY OF AMMONIA: CPT | Performed by: EMERGENCY MEDICINE

## 2025-06-12 PROCEDURE — 96374 THER/PROPH/DIAG INJ IV PUSH: CPT

## 2025-06-12 PROCEDURE — 83690 ASSAY OF LIPASE: CPT | Performed by: EMERGENCY MEDICINE

## 2025-06-12 PROCEDURE — 82077 ASSAY SPEC XCP UR&BREATH IA: CPT | Performed by: EMERGENCY MEDICINE

## 2025-06-12 PROCEDURE — 99285 EMERGENCY DEPT VISIT HI MDM: CPT | Performed by: EMERGENCY MEDICINE

## 2025-06-12 PROCEDURE — 80053 COMPREHEN METABOLIC PANEL: CPT | Performed by: EMERGENCY MEDICINE

## 2025-06-12 PROCEDURE — 74022 RADEX COMPL AQT ABD SERIES: CPT

## 2025-06-12 PROCEDURE — 96365 THER/PROPH/DIAG IV INF INIT: CPT

## 2025-06-12 PROCEDURE — 74176 CT ABD & PELVIS W/O CONTRAST: CPT

## 2025-06-12 PROCEDURE — 85730 THROMBOPLASTIN TIME PARTIAL: CPT | Performed by: EMERGENCY MEDICINE

## 2025-06-12 PROCEDURE — 85610 PROTHROMBIN TIME: CPT | Performed by: EMERGENCY MEDICINE

## 2025-06-12 PROCEDURE — 71250 CT THORAX DX C-: CPT

## 2025-06-12 PROCEDURE — 84132 ASSAY OF SERUM POTASSIUM: CPT | Performed by: EMERGENCY MEDICINE

## 2025-06-12 PROCEDURE — 96375 TX/PRO/DX INJ NEW DRUG ADDON: CPT

## 2025-06-12 RX ORDER — LABETALOL HYDROCHLORIDE 5 MG/ML
20 INJECTION, SOLUTION INTRAVENOUS ONCE
Status: COMPLETED | OUTPATIENT
Start: 2025-06-12 | End: 2025-06-12

## 2025-06-12 RX ORDER — ONDANSETRON 2 MG/ML
4 INJECTION INTRAMUSCULAR; INTRAVENOUS ONCE
Status: COMPLETED | OUTPATIENT
Start: 2025-06-12 | End: 2025-06-12

## 2025-06-12 RX ORDER — LORAZEPAM 2 MG/ML
1 INJECTION INTRAMUSCULAR ONCE
Status: COMPLETED | OUTPATIENT
Start: 2025-06-12 | End: 2025-06-12

## 2025-06-12 RX ADMIN — ONDANSETRON 4 MG: 2 INJECTION INTRAMUSCULAR; INTRAVENOUS at 21:47

## 2025-06-12 RX ADMIN — THIAMINE HYDROCHLORIDE: 100 INJECTION, SOLUTION INTRAMUSCULAR; INTRAVENOUS at 16:46

## 2025-06-12 RX ADMIN — LORAZEPAM 1 MG: 2 INJECTION INTRAMUSCULAR; INTRAVENOUS at 22:17

## 2025-06-12 RX ADMIN — LABETALOL HYDROCHLORIDE 20 MG: 5 INJECTION, SOLUTION INTRAVENOUS at 22:16

## 2025-06-12 RX ADMIN — LABETALOL HYDROCHLORIDE 20 MG: 5 INJECTION, SOLUTION INTRAVENOUS at 20:22

## 2025-06-12 NOTE — ED PROVIDER NOTES
Pt Name: Wes Araujo  MRN: [id number]  Birthdate 1970  Age/Sex: 55 y.o. male  Date of evaluation: 6/12/2025  PCP: Rush Kebede MD        FINAL IMPRESSION    Final diagnoses:   Ascites   Jaundice         DISPOSITION/PLAN      Time reflects when diagnosis was documented in both MDM as applicable and the Disposition within this note       Time User Action Codes Description Comment    6/12/2025  8:59 PM Bhavani Daigle Add [R18.8] Ascites     6/12/2025  8:59 PM Bhavani Daigle Add [R17] Jaundice           ED Disposition       ED Disposition   Admit    Condition   Stable    Date/Time   Thu Jun 12, 2025  8:59 PM    Comment   Case was discussed with [facility] and the patient's admission status was agreed to be Admission Status: inpatient status to the service of Dr. Buchanan .               Follow-up Information    None           PATIENT REFERRED TO:    No follow-up provider specified.    DISCHARGE MEDICATIONS:    Patient's Medications   Discharge Prescriptions    No medications on file       No discharge procedures on file.          CHIEF COMPLAINT    Chief Complaint   Patient presents with    Constipation     Pt presents with constipation for approx 1 week or so, states he was just here recently for same, has liver and kidney issues, pt presents with abd bloating and jaundice.          HPI    Wes presents to the Emergency Department complaining of abdominal distension as well as no bowel movement for 6 days.  He has recently hospitalized for similar.       HPI      Past Medical and Surgical History    Past Medical History[1]    Past Surgical History[2]    Family History[3]    Social History[4]      .    Allergies    Allergies[5]    Home Medications    Prior to Admission medications    Medication Sig Start Date End Date Taking? Authorizing Provider   Alcohol Swabs 70 % PADS May substitute brand based on insurance coverage. Check glucose TID. 3/18/25   Johny Sung, DO  "  amLODIPine (NORVASC) 5 mg tablet Take 1 tablet (5 mg total) by mouth 2 (two) times a day 3/18/25 5/17/25  Johny Sung, DO   Blood Glucose Monitoring Suppl (OneTouch Verio Reflect) w/Device KIT May substitute brand based on insurance coverage. Check glucose TID. 3/18/25   Johny Sung, DO   carvedilol (COREG) 6.25 mg tablet Take 1 tablet (6.25 mg total) by mouth 2 (two) times a day with meals 6/6/25   Mojgan Young MD   folic acid ( Folic Acid) 1 mg tablet Take 1 tablet (1 mg total) by mouth daily 6/6/25 7/6/25  Mojgan Young MD   glucose blood (OneTouch Verio) test strip May substitute brand based on insurance coverage. Check glucose TID. 3/18/25   Johny Sung, DO   hydrALAZINE (APRESOLINE) 25 mg tablet Take 1 tablet (25 mg total) by mouth every 8 (eight) hours 3/18/25 5/17/25  Johny Sung, DO   insulin NPH-insulin regular (HumuLIN 70/30) 100 units/mL subcutaneous injection Inject 8 Units under the skin 2 (two) times a day before meals Do not give yourself insulin if you are not going to eat. 6/6/25   Mojgan Young MD   Insulin Syringe-Needle U-100 (BD Insulin Syringe U/F) 31G X 5/16\" 0.3 ML MISC For use with insulin. Pharmacy may dispense brand covered by insurance. 3/18/25   Johny Sung, DO   Multiple Vitamin (multivitamin) tablet Take 1 tablet by mouth daily 1/27/24 2/26/24  Julio Mcneil MD   OneTouch Delica Lancets 33G MISC May substitute brand based on insurance coverage. Check glucose TID. 3/18/25   Johny Sung, DO   pancrelipase, Lip-Prot-Amyl, (CREON) 24,000 units Take 24,000 units of lipase by mouth 3 (three) times a day with meals 10/26/24   Fannie Hernandez MD   sodium bicarbonate 650 mg tablet Take 2 tablets (1,300 mg total) by mouth 3 (three) times daily after meals 6/6/25   Mojgan Young MD   thiamine 50 MG tablet Take 1 tablet (50 mg total) by mouth daily 10/26/24   Fannie Hernandez MD           Review of " Systems    Review of Systems   Constitutional:  Positive for activity change, appetite change and fatigue. Negative for chills and fever.   HENT:  Negative for ear pain and sore throat.    Eyes:  Negative for pain and visual disturbance.   Respiratory:  Negative for cough and shortness of breath.    Cardiovascular:  Negative for chest pain and palpitations.   Gastrointestinal:  Positive for abdominal distention, constipation and nausea. Negative for abdominal pain and vomiting.   Genitourinary:  Negative for dysuria and hematuria.   Musculoskeletal:  Negative for arthralgias and back pain.   Skin:  Positive for color change. Negative for rash.   Neurological:  Positive for weakness. Negative for seizures and syncope.   All other systems reviewed and are negative.      Physical Exam      ED Triage Vitals [06/12/25 1451]   Temperature Pulse Respirations Blood Pressure SpO2   98.2 °F (36.8 °C) 66 20 155/84 99 %      Temp Source Heart Rate Source Patient Position - Orthostatic VS BP Location FiO2 (%)   Oral Monitor Sitting Right arm --      Pain Score       --               Physical Exam  Vitals and nursing note reviewed.   Constitutional:       General: He is not in acute distress.     Appearance: He is well-developed. He is not diaphoretic.   HENT:      Head: Normocephalic and atraumatic.      Nose: Nose normal.     Eyes:      General: Scleral icterus present.      Conjunctiva/sclera: Conjunctivae normal.      Pupils: Pupils are equal, round, and reactive to light.       Cardiovascular:      Rate and Rhythm: Normal rate and regular rhythm.      Heart sounds: Normal heart sounds. No murmur heard.     No friction rub. No gallop.   Pulmonary:      Effort: Pulmonary effort is normal. No respiratory distress.      Breath sounds: Normal breath sounds. No stridor. No wheezing or rales.   Abdominal:      General: Bowel sounds are normal. There is distension.      Palpations: Abdomen is soft. There is shifting dullness, fluid  wave and hepatomegaly.      Tenderness: There is no abdominal tenderness. There is no guarding or rebound.     Musculoskeletal:         General: Normal range of motion.      Cervical back: Normal range of motion and neck supple.     Skin:     General: Skin is warm and dry.      Coloration: Skin is jaundiced.      Findings: No rash.     Neurological:      Mental Status: He is alert and oriented to person, place, and time.      Cranial Nerves: No cranial nerve deficit.     Psychiatric:         Behavior: Behavior normal.                Assessment and Plan    Wes Araujo is a 55 y.o. male who presents with significant jaundice and . Physical examination remarkable for jaundice/ scleral icterus as well as abdominal distention . Differential diagnosis (not completely inclusive) includes advanced liver disease. Plan will be to perform diagnostic testing and treat symptomatically.      MDM      Diagnostic Results    Labs:    Results for orders placed or performed during the hospital encounter of 06/12/25   CBC and differential   Result Value Ref Range    WBC 10.34 (H) 4.31 - 10.16 Thousand/uL    RBC 2.59 (L) 3.88 - 5.62 Million/uL    Hemoglobin 8.3 (L) 12.0 - 17.0 g/dL    Hematocrit 24.3 (L) 36.5 - 49.3 %    MCV 94 82 - 98 fL    MCH 32.0 26.8 - 34.3 pg    MCHC 34.2 31.4 - 37.4 g/dL    RDW 16.7 (H) 11.6 - 15.1 %    MPV 12.2 8.9 - 12.7 fL    Platelets 245 149 - 390 Thousands/uL    nRBC 0 /100 WBCs    Segmented % 72 43 - 75 %    Immature Grans % 1 0 - 2 %    Lymphocytes % 13 (L) 14 - 44 %    Monocytes % 9 4 - 12 %    Eosinophils Relative 4 0 - 6 %    Basophils Relative 1 0 - 1 %    Absolute Neutrophils 7.46 1.85 - 7.62 Thousands/µL    Absolute Immature Grans 0.07 0.00 - 0.20 Thousand/uL    Absolute Lymphocytes 1.35 0.60 - 4.47 Thousands/µL    Absolute Monocytes 0.97 0.17 - 1.22 Thousand/µL    Eosinophils Absolute 0.44 0.00 - 0.61 Thousand/µL    Basophils Absolute 0.05 0.00 - 0.10 Thousands/µL   Comprehensive metabolic panel    Result Value Ref Range    Sodium 130 (L) 135 - 147 mmol/L    Potassium 6.7 (HH) 3.5 - 5.3 mmol/L    Chloride 108 96 - 108 mmol/L    CO2 17 (L) 21 - 32 mmol/L    ANION GAP 5 4 - 13 mmol/L    BUN 29 (H) 5 - 25 mg/dL    Creatinine 2.43 (H) 0.60 - 1.30 mg/dL    Glucose 315 (H) 65 - 140 mg/dL    Calcium 8.6 8.4 - 10.2 mg/dL    Corrected Calcium 9.1 8.3 - 10.1 mg/dL    AST 54 (H) 13 - 39 U/L    ALT 36 7 - 52 U/L    Alkaline Phosphatase 348 (H) 34 - 104 U/L    Total Protein 5.5 (L) 6.4 - 8.4 g/dL    Albumin 3.4 (L) 3.5 - 5.0 g/dL    Total Bilirubin 13.98 (H) 0.20 - 1.00 mg/dL    eGFR 28 ml/min/1.73sq m   Protime-INR   Result Value Ref Range    Protime 15.2 (H) 12.3 - 15.0 seconds    INR 1.18 0.85 - 1.19   APTT   Result Value Ref Range    PTT 28 23 - 34 seconds   Ammonia   Result Value Ref Range    Ammonia 47 18 - 72 umol/L   Ethanol   Result Value Ref Range    Ethanol Lvl <10 <10 mg/dL   Lipase   Result Value Ref Range    Lipase <6 (L) 11 - 82 u/L   Potassium   Result Value Ref Range    Potassium 4.7 3.5 - 5.3 mmol/L       All labs reviewed and utilized in the medical decision making process    Radiology:    CT chest abdomen pelvis wo contrast   Final Result      Large volume abdominopelvic ascites, increased since 6/3/2025.      Unchanged findings of chronic pancreatitis.            Workstation performed: [id number]         XR abdomen obstruction series    (Results Pending)     Chest with right mid lung linear atelectasis, increased bowel gas      All radiology studies independently viewed by me and interpreted by the radiologist.    Procedure    Procedures      ED Course of Care and Re-Assessments      Medications   folic acid 1 mg, thiamine (VITAMIN B1) 100 mg in dextrose 5 % 100 mL IV piggyback ( Intravenous Stopped 6/12/25 1809)   labetalol (NORMODYNE) injection 20 mg (20 mg Intravenous Given 6/12/25 2022)   labetalol (NORMODYNE) injection 20 mg (20 mg Intravenous Given 6/12/25 0720)   ondansetron (ZOFRAN) injection  4 mg (4 mg Intravenous Given 6/12/25 2147)   LORazepam (ATIVAN) injection 1 mg (1 mg Intravenous Given 6/12/25 2217)                  Bhavani Daigle DO       [1]   Past Medical History:  Diagnosis Date    Alcohol abuse     Chronic pancreatitis (HCC)     Diabetes mellitus (HCC)     Type 2    Non compliance w medication regimen     Pancreatitis     Paroxysmal A-fib (HCC)     Thrombocytopenia (HCC)    [2]   Past Surgical History:  Procedure Laterality Date    INCISION AND DRAINAGE OF WOUND N/A 8/16/2020    Procedure: INCISION AND DRAINAGE (I&D) HEAD/FACE;  Surgeon: Estrada Walton DMD;  Location: [facility];  Service: Maxillofacial    IR BIOPSY BONE MARROW  2/7/2019    IR PARACENTESIS  6/3/2025    REMOVAL OF IMPACTED TOOTH - COMPLETELY BONY N/A 8/16/2020    Procedure: EXTRACTION TEETH MULTIPLE #2, 3;  Surgeon: Estrada Walton DMD;  Location: [facility];  Service: Maxillofacial   [3]   Family History  Problem Relation Name Age of Onset    Diabetes Mother      Hypertension Mother      Hyperlipidemia Father     [4]   Social History  Tobacco Use    Smoking status: Former     Passive exposure: Past    Smokeless tobacco: Never   Vaping Use    Vaping status: Never Used   Substance Use Topics    Alcohol use: Yes    Drug use: Not Currently     Types: Cocaine   [5] No Known Allergies       Bhavani Daigle DO  06/12/25 2239

## 2025-06-13 ENCOUNTER — APPOINTMENT (INPATIENT)
Dept: RADIOLOGY | Facility: HOSPITAL | Age: 55
DRG: 280 | End: 2025-06-13
Payer: COMMERCIAL

## 2025-06-13 ENCOUNTER — ANESTHESIA EVENT (INPATIENT)
Dept: GASTROENTEROLOGY | Facility: HOSPITAL | Age: 55
End: 2025-06-13
Payer: COMMERCIAL

## 2025-06-13 ENCOUNTER — APPOINTMENT (INPATIENT)
Dept: GASTROENTEROLOGY | Facility: HOSPITAL | Age: 55
DRG: 280 | End: 2025-06-13
Attending: STUDENT IN AN ORGANIZED HEALTH CARE EDUCATION/TRAINING PROGRAM
Payer: COMMERCIAL

## 2025-06-13 ENCOUNTER — ANESTHESIA (INPATIENT)
Dept: GASTROENTEROLOGY | Facility: HOSPITAL | Age: 55
End: 2025-06-13
Payer: COMMERCIAL

## 2025-06-13 PROBLEM — F10.10 ALCOHOL ABUSE: Status: ACTIVE | Noted: 2025-06-13

## 2025-06-13 PROBLEM — K92.1 MELENA: Status: ACTIVE | Noted: 2025-06-13

## 2025-06-13 PROBLEM — K92.0 COFFEE GROUND EMESIS: Status: ACTIVE | Noted: 2025-06-13

## 2025-06-13 PROBLEM — K59.00 CONSTIPATION: Status: ACTIVE | Noted: 2025-06-13

## 2025-06-13 PROBLEM — N18.4 CKD (CHRONIC KIDNEY DISEASE) STAGE 4, GFR 15-29 ML/MIN (HCC): Status: ACTIVE | Noted: 2025-06-13

## 2025-06-13 LAB
ABO GROUP BLD: NORMAL
ALBUMIN SERPL BCG-MCNC: 3.3 G/DL (ref 3.5–5)
ALP SERPL-CCNC: 360 U/L (ref 34–104)
ALT SERPL W P-5'-P-CCNC: 33 U/L (ref 7–52)
ANION GAP SERPL CALCULATED.3IONS-SCNC: 9 MMOL/L (ref 4–13)
APPEARANCE FLD: CLEAR
AST SERPL W P-5'-P-CCNC: 33 U/L (ref 13–39)
BASOPHILS # BLD AUTO: 0.05 THOUSANDS/ÂΜL (ref 0–0.1)
BASOPHILS NFR BLD AUTO: 0 % (ref 0–1)
BILIRUB SERPL-MCNC: 14.2 MG/DL (ref 0.2–1)
BLD GP AB SCN SERPL QL: NEGATIVE
BUN SERPL-MCNC: 33 MG/DL (ref 5–25)
CALCIUM ALBUM COR SERPL-MCNC: 9.6 MG/DL (ref 8.3–10.1)
CALCIUM SERPL-MCNC: 9 MG/DL (ref 8.4–10.2)
CHLORIDE SERPL-SCNC: 109 MMOL/L (ref 96–108)
CO2 SERPL-SCNC: 18 MMOL/L (ref 21–32)
COLOR FLD: YELLOW
CREAT SERPL-MCNC: 2.42 MG/DL (ref 0.6–1.3)
DME PARACHUTE DELIVERY DATE REQUESTED: NORMAL
DME PARACHUTE ITEM DESCRIPTION: NORMAL
DME PARACHUTE ORDER STATUS: NORMAL
DME PARACHUTE SUPPLIER NAME: NORMAL
DME PARACHUTE SUPPLIER PHONE: NORMAL
EOSINOPHIL # BLD AUTO: 0.3 THOUSAND/ÂΜL (ref 0–0.61)
EOSINOPHIL NFR BLD AUTO: 3 % (ref 0–6)
ERYTHROCYTE [DISTWIDTH] IN BLOOD BY AUTOMATED COUNT: 16.6 % (ref 11.6–15.1)
GFR SERPL CREATININE-BSD FRML MDRD: 28 ML/MIN/1.73SQ M
GLUCOSE SERPL-MCNC: 151 MG/DL (ref 65–140)
GLUCOSE SERPL-MCNC: 251 MG/DL (ref 65–140)
GLUCOSE SERPL-MCNC: 260 MG/DL (ref 65–140)
GLUCOSE SERPL-MCNC: 263 MG/DL (ref 65–140)
GLUCOSE SERPL-MCNC: 280 MG/DL (ref 65–140)
GLUCOSE SERPL-MCNC: 292 MG/DL (ref 65–140)
GLUCOSE SERPL-MCNC: 300 MG/DL (ref 65–140)
HCT VFR BLD AUTO: 21.7 % (ref 36.5–49.3)
HGB BLD-MCNC: 7.6 G/DL (ref 12–17)
HISTIOCYTES NFR FLD: 21 %
IMM GRANULOCYTES # BLD AUTO: 0.07 THOUSAND/UL (ref 0–0.2)
IMM GRANULOCYTES NFR BLD AUTO: 1 % (ref 0–2)
INR PPP: 1.28 (ref 0.85–1.19)
LYMPHOCYTES # BLD AUTO: 1.18 THOUSANDS/ÂΜL (ref 0.6–4.47)
LYMPHOCYTES NFR BLD AUTO: 10 % (ref 14–44)
LYMPHOCYTES NFR BLD AUTO: 63 %
MCH RBC QN AUTO: 32.3 PG (ref 26.8–34.3)
MCHC RBC AUTO-ENTMCNC: 35 G/DL (ref 31.4–37.4)
MCV RBC AUTO: 92 FL (ref 82–98)
MONO+MESO NFR FLD MANUAL: 7 %
MONOCYTES # BLD AUTO: 0.91 THOUSAND/ÂΜL (ref 0.17–1.22)
MONOCYTES NFR BLD AUTO: 2 %
MONOCYTES NFR BLD AUTO: 8 % (ref 4–12)
NEUTROPHILS # BLD AUTO: 8.93 THOUSANDS/ÂΜL (ref 1.85–7.62)
NEUTS SEG NFR BLD AUTO: 7 %
NEUTS SEG NFR BLD AUTO: 78 % (ref 43–75)
NRBC BLD AUTO-RTO: 0 /100 WBCS
PLATELET # BLD AUTO: 209 THOUSANDS/UL (ref 149–390)
PMV BLD AUTO: 12.2 FL (ref 8.9–12.7)
POTASSIUM SERPL-SCNC: 5.1 MMOL/L (ref 3.5–5.3)
PROT SERPL-MCNC: 5.7 G/DL (ref 6.4–8.4)
PROTHROMBIN TIME: 16.2 SECONDS (ref 12.3–15)
RBC # BLD AUTO: 2.35 MILLION/UL (ref 3.88–5.62)
RH BLD: POSITIVE
SITE: NORMAL
SODIUM SERPL-SCNC: 136 MMOL/L (ref 135–147)
SPECIMEN EXPIRATION DATE: NORMAL
TOTAL CELLS COUNTED SPEC: 100
WBC # BLD AUTO: 11.44 THOUSAND/UL (ref 4.31–10.16)
WBC # FLD MANUAL: 166 /UL

## 2025-06-13 PROCEDURE — 0W9G3ZZ DRAINAGE OF PERITONEAL CAVITY, PERCUTANEOUS APPROACH: ICD-10-PCS | Performed by: RADIOLOGY

## 2025-06-13 PROCEDURE — 0DJ08ZZ INSPECTION OF UPPER INTESTINAL TRACT, VIA NATURAL OR ARTIFICIAL OPENING ENDOSCOPIC: ICD-10-PCS | Performed by: STUDENT IN AN ORGANIZED HEALTH CARE EDUCATION/TRAINING PROGRAM

## 2025-06-13 PROCEDURE — 87070 CULTURE OTHR SPECIMN AEROBIC: CPT | Performed by: STUDENT IN AN ORGANIZED HEALTH CARE EDUCATION/TRAINING PROGRAM

## 2025-06-13 PROCEDURE — 86901 BLOOD TYPING SEROLOGIC RH(D): CPT | Performed by: STUDENT IN AN ORGANIZED HEALTH CARE EDUCATION/TRAINING PROGRAM

## 2025-06-13 PROCEDURE — 82948 REAGENT STRIP/BLOOD GLUCOSE: CPT

## 2025-06-13 PROCEDURE — 43235 EGD DIAGNOSTIC BRUSH WASH: CPT | Performed by: STUDENT IN AN ORGANIZED HEALTH CARE EDUCATION/TRAINING PROGRAM

## 2025-06-13 PROCEDURE — 86900 BLOOD TYPING SEROLOGIC ABO: CPT | Performed by: STUDENT IN AN ORGANIZED HEALTH CARE EDUCATION/TRAINING PROGRAM

## 2025-06-13 PROCEDURE — 99223 1ST HOSP IP/OBS HIGH 75: CPT | Performed by: INTERNAL MEDICINE

## 2025-06-13 PROCEDURE — 49083 ABD PARACENTESIS W/IMAGING: CPT

## 2025-06-13 PROCEDURE — 89051 BODY FLUID CELL COUNT: CPT | Performed by: STUDENT IN AN ORGANIZED HEALTH CARE EDUCATION/TRAINING PROGRAM

## 2025-06-13 PROCEDURE — 85610 PROTHROMBIN TIME: CPT

## 2025-06-13 PROCEDURE — NC001 PR NO CHARGE: Performed by: PHYSICIAN ASSISTANT

## 2025-06-13 PROCEDURE — 86923 COMPATIBILITY TEST ELECTRIC: CPT

## 2025-06-13 PROCEDURE — 86850 RBC ANTIBODY SCREEN: CPT | Performed by: STUDENT IN AN ORGANIZED HEALTH CARE EDUCATION/TRAINING PROGRAM

## 2025-06-13 PROCEDURE — 85025 COMPLETE CBC W/AUTO DIFF WBC: CPT

## 2025-06-13 PROCEDURE — 87205 SMEAR GRAM STAIN: CPT | Performed by: STUDENT IN AN ORGANIZED HEALTH CARE EDUCATION/TRAINING PROGRAM

## 2025-06-13 PROCEDURE — 97163 PT EVAL HIGH COMPLEX 45 MIN: CPT

## 2025-06-13 PROCEDURE — 99255 IP/OBS CONSLTJ NEW/EST HI 80: CPT | Performed by: STUDENT IN AN ORGANIZED HEALTH CARE EDUCATION/TRAINING PROGRAM

## 2025-06-13 PROCEDURE — 97166 OT EVAL MOD COMPLEX 45 MIN: CPT

## 2025-06-13 PROCEDURE — 80053 COMPREHEN METABOLIC PANEL: CPT

## 2025-06-13 RX ORDER — HYDRALAZINE HYDROCHLORIDE 25 MG/1
25 TABLET, FILM COATED ORAL EVERY 8 HOURS SCHEDULED
Status: DISCONTINUED | OUTPATIENT
Start: 2025-06-13 | End: 2025-06-13

## 2025-06-13 RX ORDER — CARVEDILOL 6.25 MG/1
6.25 TABLET ORAL 2 TIMES DAILY WITH MEALS
Status: DISCONTINUED | OUTPATIENT
Start: 2025-06-13 | End: 2025-06-20 | Stop reason: HOSPADM

## 2025-06-13 RX ORDER — LIDOCAINE WITH 8.4% SOD BICARB 0.9%(10ML)
SYRINGE (ML) INJECTION AS NEEDED
Status: COMPLETED | OUTPATIENT
Start: 2025-06-13 | End: 2025-06-13

## 2025-06-13 RX ORDER — SODIUM CHLORIDE, SODIUM LACTATE, POTASSIUM CHLORIDE, CALCIUM CHLORIDE 600; 310; 30; 20 MG/100ML; MG/100ML; MG/100ML; MG/100ML
INJECTION, SOLUTION INTRAVENOUS CONTINUOUS PRN
Status: DISCONTINUED | OUTPATIENT
Start: 2025-06-13 | End: 2025-06-13

## 2025-06-13 RX ORDER — POLYETHYLENE GLYCOL 3350 17 G/17G
17 POWDER, FOR SOLUTION ORAL DAILY PRN
Status: DISCONTINUED | OUTPATIENT
Start: 2025-06-13 | End: 2025-06-18

## 2025-06-13 RX ORDER — FOLIC ACID 1 MG/1
1 TABLET ORAL DAILY
Status: DISCONTINUED | OUTPATIENT
Start: 2025-06-13 | End: 2025-06-19

## 2025-06-13 RX ORDER — LIDOCAINE HYDROCHLORIDE 20 MG/ML
INJECTION, SOLUTION EPIDURAL; INFILTRATION; INTRACAUDAL; PERINEURAL AS NEEDED
Status: DISCONTINUED | OUTPATIENT
Start: 2025-06-13 | End: 2025-06-13

## 2025-06-13 RX ORDER — INSULIN LISPRO 100 [IU]/ML
1-5 INJECTION, SOLUTION INTRAVENOUS; SUBCUTANEOUS
Status: DISCONTINUED | OUTPATIENT
Start: 2025-06-13 | End: 2025-06-19

## 2025-06-13 RX ORDER — PANTOPRAZOLE SODIUM 40 MG/10ML
40 INJECTION, POWDER, LYOPHILIZED, FOR SOLUTION INTRAVENOUS EVERY 12 HOURS SCHEDULED
Status: DISCONTINUED | OUTPATIENT
Start: 2025-06-13 | End: 2025-06-14

## 2025-06-13 RX ORDER — LANOLIN ALCOHOL/MO/W.PET/CERES
100 CREAM (GRAM) TOPICAL DAILY
Status: DISCONTINUED | OUTPATIENT
Start: 2025-06-13 | End: 2025-06-19

## 2025-06-13 RX ORDER — INSULIN ASPART 100 [IU]/ML
8 INJECTION, SUSPENSION SUBCUTANEOUS
Status: DISCONTINUED | OUTPATIENT
Start: 2025-06-13 | End: 2025-06-19

## 2025-06-13 RX ORDER — BISACODYL 10 MG
10 SUPPOSITORY, RECTAL RECTAL DAILY PRN
Status: DISCONTINUED | OUTPATIENT
Start: 2025-06-13 | End: 2025-06-20 | Stop reason: HOSPADM

## 2025-06-13 RX ORDER — AMLODIPINE BESYLATE 5 MG/1
5 TABLET ORAL 2 TIMES DAILY
Status: DISCONTINUED | OUTPATIENT
Start: 2025-06-13 | End: 2025-06-20 | Stop reason: HOSPADM

## 2025-06-13 RX ORDER — ALBUMIN (HUMAN) 12.5 G/50ML
12.5 SOLUTION INTRAVENOUS ONCE
Status: CANCELLED | OUTPATIENT
Start: 2025-06-13 | End: 2025-06-13

## 2025-06-13 RX ORDER — SENNOSIDES 8.6 MG
2 TABLET ORAL
Status: DISCONTINUED | OUTPATIENT
Start: 2025-06-13 | End: 2025-06-18

## 2025-06-13 RX ORDER — HYDRALAZINE HYDROCHLORIDE 25 MG/1
25 TABLET, FILM COATED ORAL EVERY 8 HOURS SCHEDULED
Status: DISCONTINUED | OUTPATIENT
Start: 2025-06-13 | End: 2025-06-16

## 2025-06-13 RX ORDER — PROPOFOL 10 MG/ML
INJECTION, EMULSION INTRAVENOUS AS NEEDED
Status: DISCONTINUED | OUTPATIENT
Start: 2025-06-13 | End: 2025-06-13

## 2025-06-13 RX ORDER — DOCUSATE SODIUM 100 MG/1
100 CAPSULE, LIQUID FILLED ORAL 2 TIMES DAILY
Status: DISCONTINUED | OUTPATIENT
Start: 2025-06-13 | End: 2025-06-19

## 2025-06-13 RX ORDER — SODIUM CHLORIDE, SODIUM LACTATE, POTASSIUM CHLORIDE, CALCIUM CHLORIDE 600; 310; 30; 20 MG/100ML; MG/100ML; MG/100ML; MG/100ML
50 INJECTION, SOLUTION INTRAVENOUS CONTINUOUS
Status: CANCELLED | OUTPATIENT
Start: 2025-06-13

## 2025-06-13 RX ORDER — ALBUMIN (HUMAN) 12.5 G/50ML
50 SOLUTION INTRAVENOUS ONCE
Status: COMPLETED | OUTPATIENT
Start: 2025-06-13 | End: 2025-06-13

## 2025-06-13 RX ORDER — BISACODYL 10 MG
10 SUPPOSITORY, RECTAL RECTAL DAILY PRN
Status: DISCONTINUED | OUTPATIENT
Start: 2025-06-13 | End: 2025-06-13

## 2025-06-13 RX ORDER — SODIUM BICARBONATE 650 MG/1
1300 TABLET ORAL
Status: DISCONTINUED | OUTPATIENT
Start: 2025-06-13 | End: 2025-06-20 | Stop reason: HOSPADM

## 2025-06-13 RX ADMIN — CEFTRIAXONE 1000 MG: 10 INJECTION, POWDER, FOR SOLUTION INTRAVENOUS at 17:46

## 2025-06-13 RX ADMIN — SODIUM BICARBONATE 650 MG TABLET 1300 MG: at 09:05

## 2025-06-13 RX ADMIN — SODIUM CHLORIDE, SODIUM LACTATE, POTASSIUM CHLORIDE, AND CALCIUM CHLORIDE: .6; .31; .03; .02 INJECTION, SOLUTION INTRAVENOUS at 15:23

## 2025-06-13 RX ADMIN — HYDRALAZINE HYDROCHLORIDE 25 MG: 25 TABLET ORAL at 02:44

## 2025-06-13 RX ADMIN — SODIUM BICARBONATE 650 MG TABLET 1300 MG: at 12:54

## 2025-06-13 RX ADMIN — PROPOFOL 100 MCG/KG/MIN: 10 INJECTION, EMULSION INTRAVENOUS at 15:26

## 2025-06-13 RX ADMIN — PANTOPRAZOLE SODIUM 40 MG: 40 INJECTION, POWDER, LYOPHILIZED, FOR SOLUTION INTRAVENOUS at 09:05

## 2025-06-13 RX ADMIN — CARVEDILOL 6.25 MG: 6.25 TABLET, FILM COATED ORAL at 09:05

## 2025-06-13 RX ADMIN — PROPOFOL 130 MG: 10 INJECTION, EMULSION INTRAVENOUS at 15:24

## 2025-06-13 RX ADMIN — Medication 10 ML: at 09:34

## 2025-06-13 RX ADMIN — INSULIN LISPRO 3 UNITS: 100 INJECTION, SOLUTION INTRAVENOUS; SUBCUTANEOUS at 21:37

## 2025-06-13 RX ADMIN — TRIMETHOBENZAMIDE HYDROCHLORIDE 200 MG: 100 INJECTION INTRAMUSCULAR at 07:41

## 2025-06-13 RX ADMIN — ALBUMIN (HUMAN) 50 G: 0.25 INJECTION, SOLUTION INTRAVENOUS at 10:41

## 2025-06-13 RX ADMIN — PANTOPRAZOLE SODIUM 40 MG: 40 INJECTION, POWDER, LYOPHILIZED, FOR SOLUTION INTRAVENOUS at 21:34

## 2025-06-13 RX ADMIN — INSULIN LISPRO 3 UNITS: 100 INJECTION, SOLUTION INTRAVENOUS; SUBCUTANEOUS at 09:04

## 2025-06-13 RX ADMIN — SODIUM BICARBONATE 650 MG TABLET 1300 MG: at 17:44

## 2025-06-13 RX ADMIN — INSULIN LISPRO 2 UNITS: 100 INJECTION, SOLUTION INTRAVENOUS; SUBCUTANEOUS at 12:54

## 2025-06-13 RX ADMIN — SENNOSIDES 17.2 MG: 8.6 TABLET, FILM COATED ORAL at 21:34

## 2025-06-13 RX ADMIN — LIDOCAINE HYDROCHLORIDE 70 MG: 20 INJECTION, SOLUTION EPIDURAL; INFILTRATION; INTRACAUDAL at 15:24

## 2025-06-13 RX ADMIN — CARVEDILOL 6.25 MG: 6.25 TABLET, FILM COATED ORAL at 17:44

## 2025-06-13 RX ADMIN — INSULIN LISPRO 3 UNITS: 100 INJECTION, SOLUTION INTRAVENOUS; SUBCUTANEOUS at 02:44

## 2025-06-13 NOTE — ASSESSMENT & PLAN NOTE
Lab Results   Component Value Date    EGFR 28 06/13/2025    EGFR 28 06/12/2025    EGFR 22 06/06/2025    CREATININE 2.42 (H) 06/13/2025    CREATININE 2.43 (H) 06/12/2025    CREATININE 3.01 (H) 06/06/2025   Cr improved from discharge during recent hospitalization. Monitor closely post paracentesis. Albumin repletion given

## 2025-06-13 NOTE — ASSESSMENT & PLAN NOTE
Lab Results   Component Value Date    HGBA1C 9.6 (H) 05/26/2025       Recent Labs     06/13/25  0242 06/13/25  0724 06/13/25  0838   POCGLU 280* 251* 292*       Blood Sugar Average: Last 72 hrs:  (P) [id number]

## 2025-06-13 NOTE — OCCUPATIONAL THERAPY NOTE
Occupational Therapy Evaluation     Patient Name: Wes Araujo  Today's Date: 6/13/2025  Problem List  Principal Problem:    Alcoholic hepatitis with ascites  Active Problems:    Primary hypertension    Type 2 diabetes mellitus with hyperglycemia, with long-term current use of insulin (HCC)    Chronic pancreatitis (HCC)    History of atrial fibrillation    Generalized weakness    CKD (chronic kidney disease) stage 4, GFR 15-29 ml/min (HCC)    Alcohol abuse    Constipation    Coffee ground emesis    Melena    Past Medical History  Past Medical History[1]  Past Surgical History  Past Surgical History[2]   06/13/25 0849   OT Last Visit   OT Visit Date 06/13/25   Note Type   Note type Evaluation   Pain Assessment   Pain Assessment Tool 0-10   Pain Score No Pain   Restrictions/Precautions   Weight Bearing Precautions Per Order No   Other Precautions Chair Alarm;Bed Alarm;Fall Risk;Hard of hearing  (Portuguese speaking)   Home Living   Type of Home Apartment   Home Layout One level;Stairs to enter with rails   Bathroom Shower/Tub Tub/shower unit   Bathroom Toilet Standard   Home Equipment   (denies)   Additional Comments Pt resides with parents in a 2nd floor apartment with 2-3 KERRI with FOS to 2nd floor.   Prior Function   Level of [place] Independent with ADLs;Independent with functional mobility;Needs assistance with IADLS   Lives With Family   Receives Help From Family   IADLs Family/Friend/Other provides transportation;Family/Friend/Other provides meals;Family/Friend/Other provides medication management   Falls in the last 6 months 0   Comments PTA, Pt reports being mainly I with ADLs, however, has been requiring A with showering from brother. Pt completes functional mobility with no AD at baseline and supportive family assist with IADLs.   Lifestyle   Autonomy I with ADLs, however, has been requiring A with showering from brother/no Ad functional mobility at baseline   Reciprocal Relationships Supportive  "parents and brother   Intrinsic Gratification Will cont to assess   Subjective   Subjective \"I can't hear!\"   ADL   Where Assessed Edge of bed   Eating Assistance 7  Independent   Grooming Assistance 7  Independent   UB Bathing Assistance 5  Supervision/Setup   LB Bathing Assistance 4  Minimal Assistance   UB Dressing Assistance 5  Supervision/Setup   LB Dressing Assistance 4  Minimal Assistance   Toileting Assistance  4  Minimal Assistance   Functional Assistance 5  Supervision/Setup   Functional Deficit Increased time to complete;Supervision/safety;Setup   Bed Mobility   Rolling L 5  Supervision   Additional items Bedrails;Increased time required   Supine to Sit 5  Supervision   Additional items Bedrails;Increased time required   Sit to Supine 6  Modified independent   Additional items Increased time required   Additional Comments Pt greeted supine in bed.   Transfers   Sit to Stand 5  Supervision   Additional items Increased time required;Verbal cues   Stand to Sit 5  Supervision   Additional items Increased time required;Verbal cues   Additional Comments with RW   Functional Mobility   Functional Mobility 5  Supervision   Additional Comments S with functional mobility with RW- within room distances   Additional items Rolling walker   Balance   Static Sitting Fair +   Dynamic Sitting Fair   Static Standing Fair   Dynamic Standing Fair -   Ambulatory Fair -   Activity Tolerance   Activity Tolerance Patient tolerated treatment well   Medical Staff Made Aware Co-eval with SHANIKA Jalloh 2* to Pt's medical complexity, decreased endurance, and need for dispo planning.   Nurse Made Aware RN cleared/updated.   RUE Assessment   RUE Assessment WFL   LUE Assessment   LUE Assessment WFL   Hand Function   Gross Motor Coordination Functional   Fine Motor Coordination Functional   Sensation   Light Touch No apparent deficits   Psychosocial   Psychosocial (WDL) X   Patient Behaviors/Mood Irritable   Cognition   Overall Cognitive " Status WFL   Arousal/Participation Responsive;Cooperative   Attention Attends with cues to redirect   Orientation Level Oriented to person;Oriented to place;Oriented to time   Memory Unable to assess   Following Commands Follows one step commands with increased time or repetition   Comments Pt [place] speaking and  utilized #[id number]. Pt benefits from one-step simple commands 2* to [place].   Assessment   Limitation Decreased ADL status;Decreased UE ROM;Decreased Safe judgement during ADL;Decreased UE strength;Decreased cognition;Decreased endurance;Decreased high-level ADLs;Decreased self-care trans   Prognosis Fair   Assessment Pt is a 55 y.o. male who presented to [facility] on [facility]/12[facility] with constipation for x1 week, bloating, and jaundice. Pt with diagnosis of alcoholic hepatitis with ascites. Pt  has a past medical history of Alcohol abuse, Chronic pancreatitis (HCC), Diabetes mellitus (HCC), Non compliance w medication regimen, Pancreatitis, Paroxysmal A-fib (HCC), and Thrombocytopenia (HCC). Pt greeted bedside for OT evaluation on 06/13/25. Pt resides with parents in a 2nd floor apartment with 2-3 KERRI with FOS to 2nd floor. PTA, Pt reports being mainly I with ADLs, however, has been requiring A with showering from brother. Pt completes functional mobility with no AD at baseline and supportive family assist with IADLs. Pt demonstrating the following occupational performance levels: Supervision with UB ADLs, Min A with LB ADLs, Supervision with bed mobility, Supervision with functional transfers, and Supervision with functional mobility with RW. Limitations that impact functional performance include decreased ADL status, UE ROM, UE strength, safe judgement during ADLs, cognition, endurance, self care transfers, and high level ADLs. Occupational performance areas to address ADL retraining, functional transfer training, UE strengthening/ROM, endurance training, cognitive reorientation, Pt/caregiver education,  equipment evaluation/education, compensatory technique education, energy conservation, and activity engagement . Pt would benefit from continued skilled OT services while in hospital to maximize independence with ADLs. Will continue to follow Pt's progress. Pt would benefit from level III resources (minimal intensity) upon DC to maximize safety and independence with ADLs and functional tasks of choice.   Goals   Patient Goals To get RW for home   LTG Time Frame 10-14   Long Term Goal #1 See goals listed below   Plan   Treatment Interventions ADL retraining;Functional transfer training;UE strengthening/ROM;Cognitive reorientation;Patient/family training;Endurance training;Equipment evaluation/education;Compensatory technique education;Activityengagement;Energy conservation   Goal Expiration Date 06/27/25   OT Frequency 2-3x/wk   Discharge Recommendation   Rehab Resource Intensity Level, OT III (Minimum Resource Intensity)  (pending functional progress/available support)   Equipment Recommended Shower/Tub chair with back ($)   Additional Comments 2 The patient's raw score on the -PAC Daily Activity Inpatient Short Form is 19. A raw score of greater than or equal to 19 suggests the patient may benefit from discharge to home. Please refer to the recommendation of the Occupational Therapist for safe discharge planning.   AM-PAC Daily Activity Inpatient   Lower Body Dressing 3   Bathing 3   Toileting 3   Upper Body Dressing 3   Grooming 3   Eating 4   Daily Activity Raw Score 19   Daily Activity Standardized Score (Calc for Raw Score >=11) 40.22   AM-PAC Applied Cognition Inpatient   Following a Speech/Presentation 3   Understanding Ordinary Conversation 4   Taking Medications 3   Remembering Where Things Are Placed or Put Away 3   Remembering List of 4-5 Errands 3   Taking Care of Complicated Tasks 3   Applied Cognition Raw Score 19   Applied Cognition Standardized Score 39.77   End of Consult   Education Provided Yes    Patient Position at End of Consult Bed/Chair alarm activated;All needs within reach;Supine   Nurse Communication Nurse aware of consult       GOALS:  Pt will complete UB ADLs with I in order to maximize participation with ADLs.   Pt will complete LB ADLs with I in order to maximize safety with ADLs.   Pt will complete toileting routine (transfer, hygiene, and clothing management) with I in order to return to prior level of function.  Pt will complete bed mobility with I in order to maximize participation with ADLs.   Pt will complete functional transfers at I level in order to increase participation with ADLs.  Pt will increase dynamic standing balance to F+ in order to increase safety with ADLs.  Pt will increase standing tolerance x10 min in order to increase participation with ADLs.   Pt will complete functional mobility with AD PRN for item retrieval task at [place] level in order to increase participation with ADLs.  Pt will complete IADL tasks/simulation of IADLs tasks with I in order to return to PLOF.  Pt will demonstrate G energy conservation techniques with ADLs/IADLs in order to reduce the risk of falls.  Pt will be attentive 100% of the time for ongoing functional/formal cognitive assessment to assist with safe dc planning prn.      Kinsey Deras MS, OTR/L            [1]   Past Medical History:  Diagnosis Date    Alcohol abuse     Chronic pancreatitis (HCC)     Diabetes mellitus (HCC)     Type 2    Non compliance w medication regimen     Pancreatitis     Paroxysmal A-fib (HCC)     Thrombocytopenia (HCC)    [2]   Past Surgical History:  Procedure Laterality Date    INCISION AND DRAINAGE OF WOUND N/A 8/16/2020    Procedure: INCISION AND DRAINAGE (I&D) HEAD/FACE;  Surgeon: Estrada Walton DMD;  [facility];  Service: Maxillofacial    IR BIOPSY BONE MARROW  2/7/2019    IR PARACENTESIS  6/3/2025    REMOVAL OF IMPACTED TOOTH - COMPLETELY BONY N/A 8/16/2020    Procedure: EXTRACTION TEETH MULTIPLE  #2, 3;  Surgeon: Estrada Walton DMD;  Location: [facility];  Service: Maxillofacial

## 2025-06-13 NOTE — ASSESSMENT & PLAN NOTE
Continue amlodipine 5 mg twice daily, hydralazine 25 mg 3 times daily, and Coreg 6.25 twice daily

## 2025-06-13 NOTE — ANESTHESIA POSTPROCEDURE EVALUATION
Post-Op Assessment Note    CV Status:  Stable    Pain management: adequate       Mental Status:  Alert and awake   Hydration Status:  Euvolemic   PONV Controlled:  Controlled   Airway Patency:  Patent     Post Op Vitals Reviewed: Yes    No anethesia notable event occurred.    Staff: Anesthesiologist           Last Filed PACU Vitals:  Vitals Value Taken Time   Temp 97.5 °F (36.4 °C) 06/13/25 15:45   Pulse 71 06/13/25 15:45   /49 06/13/25 15:45   Resp 18 06/13/25 15:45   SpO2 99 % 06/13/25 15:45       Modified Christophe:     Vitals Value Taken Time   Activity 2 06/13/25 15:57   Respiration 2 06/13/25 15:57   Circulation 2 06/13/25 15:57   Consciousness 2 06/13/25 15:57   Oxygen Saturation 2 06/13/25 15:57     Modified Christophe Score: 10

## 2025-06-13 NOTE — ASSESSMENT & PLAN NOTE
Lab Results   Component Value Date    HGBA1C 9.6 (H) 05/26/2025       Recent Labs     06/13/25  0242   POCGLU 280*       Blood Sugar Average: Last 72 hrs:  (P) 280  Continue home insulin regimen of 8 units humulin 70/30 bid  SSI with accucheks

## 2025-06-13 NOTE — CONSULTS
Consultation - Gastroenterology   Name: Wes Araujo 55 y.o. male I MRN: [id number]  Unit/Bed#: [place] [address] -01 I Date of Admission: 6/12/2025   Date of Service: 6/13/2025 I Hospital Day: 1       Inpatient consult to gastroenterology     Date/Time  6/13/2025 9:51 AM     Performed by  Gwen Boone DO   Authorized by  Juan Ramon Manley PA-C           Physician Requesting Evaluation: Michael Ribeiro Pe*   Reason for Evaluation / Principal Problem: ascites    Assessment & Plan  Alcoholic hepatitis with ascites  Alcohol abuse  Coffee ground emesis  Rebecca Araujo is a 55-year-old male with a past medical history of hypertension, CKD, diabetes mellitus type 2, chronic pancreatitis secondary to chronic alcohol use, recent admission for alc hep with ZENAIDA on CKD who presented to [facility] on 6/12 for concern of recurrent abdominal distension with associated pain.   Labs on presentation significant for continued improvement is AST/ALT/ALP since last admission, however, with continue increase in bilirubin. INR 1.18 on presentation, with peak during last admission around 5.53 with dramatic improvement after Vitamin K. Kidney function appears to have improved from discharge and remains stable at this time.     Patient previously underwent serologic liver workup negative for alternative cause of liver injury, and ultimately thought to be 2/2 alc hep in setting of some degree of underlying fibrosis.  Ultimately recommend conservative management from GI perspective.  Will require close outpatient GI/hepatology follow-up to ensure LFTs have improved and also for consideration of liver biopsy versus alternative imaging to further risk stratify patient.  Also strongly recommend complete alcohol cessation moving forward.     Patient developed coffee-ground emesis and melena overnight 6/12 into 6/13 per RN/SLIM report with decline in hgb to 7.6. Current baseline 7-8, although has downtrended since earlier this year. Prior  EGD/Colonoscopy completed 2/10/25 for evaluation of unintentional weight loss.  With EGD notable for small Dula Minda's lesion in the incisura which was clipped, gastritis with negative gastric biopsies, and normal duodenum with negative duodenal biopsies.  Colonoscopy notable for 2 tubular adenomas in the ascending colon and rectum with recommendation to repeat colonoscopy in 3 years.    Pamela's Discriminant Function - Control Prothrombin 13: 28.92 at 6/13/2025  5:53 AM  Calculated from:  Total Bilirubin: 14.2 mg/dL at 6/13/2025  5:53 AM  Prothrombin Time: 16.2 seconds at 6/13/2025  5:53 AM  MDF = (4.6 * (PT - Control PT)) + Bilirubin     Ascites:  Agree with paracentesis for patient comfort today - added on infectious studies  Would recommend albumin supplementation post paracentesis given underlying renal dysfunction  Monitor Cr closely  Will need outpatient nephrology follow up to evaluate for addition of diuretics given CKD  Outpatient hepatology follow up for ongoing monitoring of liver function as well as noninvasive fibrosis testing vs. Liver biopsy as dicussed above    Coffee-ground emesis:  Melena:  Plan for EGD today  Please keep NPO   1 time dose reglan if Qtc appropriate  IV PPI BID  CKD (chronic kidney disease) stage 4, GFR 15-29 ml/min ([facility])  Lab Results   Component Value Date    EGFR 28 06/13/2025    EGFR 28 06/12/2025    EGFR 22 06/06/2025    CREATININE 2.42 (H) 06/13/2025    CREATININE 2.43 (H) 06/12/2025    CREATININE 3.01 (H) 06/06/2025   Cr improved from discharge during recent hospitalization. Monitor closely post paracentesis. Albumin repletion given  Constipation  Last bowel movement prior to presentation 6/7 per patient report. Had black stools overnight per bedside RN.       History of Present Illness   HPI:  Wes Araujo is a 55 y.o. male with history of hypertension, CKD, type 2 diabetes mellitus, chronic pancreatitis, alcohol abuse, and recent admission for alcoholic hepatitis (with no  history of cirrhosis) who presented to [facility] on 6/12 for concern of increasing abdominal distention with increased pain. Recently admitted 5/24-6/6 for alcoholic hepatitis with ascites and underwent therapeutic paracentesis at that time with removal of 1700cc fluid without SBP. Hospitalization at that time complicated by ZENAIDA managed by nephrology, and ultimately discharged without diuresis. Now presenting with recurrence of ascites. Underwent repeat paracentesis with IR with removal of 1670cc and infectious fluid studies sent. After admission, patient did develop coffee-ground emesis and reported melena with drop in Hgb from 8.3 to 7.6. Prior baseline hgb 9-10, although decreased to 6.5 during last hospitalization requiring blood transfusion. And has remained between 7-8 since that time. EGD and Colonoscopy last performed 2/10/25 for evaluation of unintentional weight loss.  With EGD notable for small Dula Minda's lesion in the incisura which was clipped, gastritis with negative gastric biopsies, and normal duodenum with negative duodenal biopsies.  Colonoscopy notable for 2 tubular adenomas in the ascending colon and rectum with recommendation to repeat colonoscopy in 3 years.     Patient seen and evaluated at bedside after paracentesis. Unfortunately, he unable to participate with  (attempted with 2 [employer] interpreters [id number], [id number]) and [employer] translate and patient continues to state he does not understand, and will only understand with someone in person. At this time, no in person  available and history limited to chart review.   Review of Systems   Unable to perform ROS: Other   Unfortunately, he unable to participate with  (attempted with 2 [employer] interpreters [id number], [id number]) and [employer] translate and patient continues to state he does not understand, and will only understand with someone in person. At this time, no in person  available and history limited to chart  review.   Medical History Review: I have reviewed the patient's PMH, PSH, Social History, Family History, Meds, and Allergies     Objective :  Temp:  [98.1 °F (36.7 °C)-98.5 °F (36.9 °C)] 98.5 °F (36.9 °C)  HR:  [63-74] 74  BP: (155-214)/() 155/87  Resp:  [16-22] 22  SpO2:  [98 %-99 %] 98 %  O2 Device: None (Room air)    Physical Exam  Vitals and nursing note reviewed.   Constitutional:       Appearance: He is ill-appearing.     Eyes:      General: Scleral icterus present.         Right eye: No discharge.         Left eye: No discharge.       Cardiovascular:      Rate and Rhythm: Normal rate.   Pulmonary:      Effort: Pulmonary effort is normal. No respiratory distress.   Abdominal:      General: Abdomen is flat. There is no distension.      Tenderness: There is no abdominal tenderness.     Skin:     General: Skin is warm and dry.      Coloration: Skin is jaundiced.      Findings: Bruising present.     Neurological:      General: No focal deficit present.      Motor: Weakness present.      Comments: Unable to evaluate orientation due to inability to participate with    Psychiatric:      Comments: Unable to evaluate           Lab Results: I have reviewed the following results:

## 2025-06-13 NOTE — ASSESSMENT & PLAN NOTE
Pt with history of alcohol abuse with withdrawal. He states he typically drinks multiple beer a day, but states he has not been drinking since he was discharged from the hospital last week  ETOH <10  Monitor on CIWA for now  Vitamin supplementation  Encourage continued cessation

## 2025-06-13 NOTE — DISCHARGE INSTRUCTIONS

## 2025-06-13 NOTE — ASSESSMENT & PLAN NOTE
Wes Araujo is a 55-year-old male with a past medical history of hypertension, CKD, diabetes mellitus type 2, chronic pancreatitis secondary to chronic alcohol use, recent admission for alc hep with ZENAIDA on CKD who presented to [facility] on 6/12 for concern of recurrent abdominal distension with associated pain.   Labs on presentation significant for continued improvement is AST/ALT/ALP since last admission, however, with continue increase in bilirubin. INR 1.18 on presentation, with peak during last admission around 5.53 with dramatic improvement after Vitamin K. Kidney function appears to have improved from discharge and remains stable at this time.     Patient previously underwent serologic liver workup negative for alternative cause of liver injury, and ultimately thought to be 2/2 alc hep in setting of some degree of underlying fibrosis.  Ultimately recommend conservative management from GI perspective.  Will require close outpatient GI/hepatology follow-up to ensure LFTs have improved and also for consideration of liver biopsy versus alternative imaging to further risk stratify patient.  Also strongly recommend complete alcohol cessation moving forward.     Patient developed coffee-ground emesis and melena overnight 6/12 into 6/13 per RN/SLIM report with decline in hgb to 7.6. Current baseline 7-8, although has downtrended since earlier this year. Prior EGD/Colonoscopy completed 2/10/25 for evaluation of unintentional weight loss.  With EGD notable for small Dula Minda's lesion in the incisura which was clipped, gastritis with negative gastric biopsies, and normal duodenum with negative duodenal biopsies.  Colonoscopy notable for 2 tubular adenomas in the ascending colon and rectum with recommendation to repeat colonoscopy in 3 years.    Pamela's Discriminant Function - Control Prothrombin 13: 28.92 at 6/13/2025  5:53 AM  Calculated from:  Total Bilirubin: 14.2 mg/dL at 6/13/2025  5:53 AM  Prothrombin Time: 16.2  seconds at 6/13/2025  5:53 AM  MDF = (4.6 * (PT - Control PT)) + Bilirubin     Ascites:  Agree with paracentesis for patient comfort today - added on infectious studies  Would recommend albumin supplementation post paracentesis given underlying renal dysfunction  Monitor Cr closely  Will need outpatient nephrology follow up to evaluate for addition of diuretics given CKD  Outpatient hepatology follow up for ongoing monitoring of liver function as well as noninvasive fibrosis testing vs. Liver biopsy as dicussed above    Coffee-ground emesis:  Melena:  Plan for EGD today  Please keep NPO   1 time dose reglan if Qtc appropriate  IV PPI BID

## 2025-06-13 NOTE — PLAN OF CARE
Problem: PHYSICAL THERAPY ADULT  Goal: Performs mobility at highest level of function for planned discharge setting.  See evaluation for individualized goals.  Description: Treatment/Interventions: Functional transfer training, LE strengthening/ROM, Therapeutic exercise, Elevations, Patient/family training, Equipment eval/education, Gait training, Bed mobility, Continued evaluation, Spoke to nursing, OT  Equipment Recommended: Walker       See flowsheet documentation for full assessment, interventions and recommendations.  Note: Prognosis: Good  Problem List: Decreased mobility  Assessment: Wes Araujo is a 55 y.o. male admitted to [facility] on 6/12/2025 for Alcoholic hepatitis with ascites. PT was consulted and pt was seen on 6/13/2025 for mobility assessment and d/c planning. Pt presents w readmission, low fall risk, language barrier requiring use of . At baseline is indep for ADLs and ambulation without DME. Reports he was walking w AD during last hospitalization but did not discharge home w equipment. Pt is currently functioning at a S for bed mobility, transfers and ambulation w RW. Pt demonstrated ability to ambulate limited household distances. No acute deficits impacting function. Further mobility deferred per RN requesting as pt w episodes of emesis and dark bowels w scope pending. Pt will benefit from continued skilled IP PT to address the above mentioned impairments  in order to maximize recovery and increase functional independence when completing mobility and ADLs. May benefit from RW given generalized deconditioning from multiple hospitalizations, to reduce fall risk and optimize mobility.  Barriers to Discharge: None     Rehab Resource Intensity Level, PT: III (Minimum Resource Intensity)    See flowsheet documentation for full assessment.

## 2025-06-13 NOTE — ASSESSMENT & PLAN NOTE
"Pt with PMHx of alcoholic liver disease presented to the ED secondary to worsening abdominal pain/distension. Pt is jaundice on exam.  Of note, pt recently admitted 5/23 - 6/06 secondary to toxic metabolic encephalopathy due to to hyperglycemia with HHNK, acute kidney injury, and acute liver failure   He denies any fever, chills, or myalgias  CT a/p demonstrating \"Large volume abdominopelvic ascites, increased since 6/3/2025.\"  LFTs appear approximately baseline, but t bili noted to be elevated to 13.98  INR 1.18  Ammonia 47  Continue to trend CMP  Paracentesis ordered  GI consult pending  "

## 2025-06-13 NOTE — UTILIZATION REVIEW
"Initial Clinical Review    Admission: Date/Time/Statement:   Admission Orders (From admission, onward)       Ordered        06/12/25 2231  INPATIENT ADMISSION  Once                          Orders Placed This Encounter   Procedures    INPATIENT ADMISSION     Standing Status:   Standing     Number of Occurrences:   1     Level of Care:   Med Surg [16]     Estimated length of stay:   More than 2 Midnights     Certification:   I certify that inpatient services are medically necessary for this patient for a duration of greater than two midnights. See H&P and MD Progress Notes for additional information about the patient's course of treatment.     ED Arrival Information       Expected   -    Arrival   6/12/2025 14:40    Acuity   Urgent              Means of arrival   Walk-In    Escorted by   Family Member    Service   Hospitalist    Admission type   Emergency              Arrival complaint   constipated             Chief Complaint   Patient presents with    Constipation     Pt presents with constipation for approx 1 week or so, states he was just here recently for same, has liver and kidney issues, pt presents with abd bloating and jaundice.        Initial Presentation: 55 y.o. male w/ PMHx of Alcohol abuse, Hyperbilirubinemia, Chronic pancreatitis, DM, Non compliance w meds, Paroxysmal A-fib and Thrombocytopenia, who presented from home to [facility] ED. Admitted as Inpatient for evaluation and treatment of Alcoholic Hepatitis w/ ascites.    Presented w/ worsening abdominal distension and pain. He states that his stomach is \"bigger than ever.\" He also expresses concern about still feeling generally unwell and weak since discharge. He states that he typically can ambulate without assistive devices normally, but lately he has been struggling to do so. Pt also reporting ongoing jaundice and no BM for 6 days. On exam, abd tenderness and distension present, Pt jaundiced. Abnormal Labs Na 130, K 6.7, CO2 17, " "Bun/Creat 29/2.43, Glucose 315, AST 54, Alk phos 348, T Protein 5.5, T bili 13.98, WBC 10.34, H/H 8.3/24.3. PT 15.2. CT a/p demonstrating \"Large volume abdominopelvic ascites, increased since 6/3/2025.\" Please see below med list for meds given in the ED.     Plan: Trend CMP, Paracentesis, Continue home insulin regimen of 8 units humulin 70/30 bid, SSI with accucheks. Monitor for alcohol withdrawal, Bowel regimen. GI consulted.    Anticipated Length of Stay/Certification Statement: Patient will be admitted on an inpatient basis with an anticipated length of stay of greater than 2 midnights secondary to large abdominal ascites in the setting of known alcoholic liver disease.     Date: 6/13/25   Day 2:   Pt had coffee-ground emesis and melena overnight  -suspicious for upper GI bleed. Labs: CO2 18, K 5.1, Bun/Creat 33/2.42, Glucose 260, Alk phos 360, T Protein 5.7, T bili 14.20, WBC 11.44, H/H 7.6/21.7, PT/INR 16.2/1.28. Plan: NPO, see below GI consult. Hold Amlodipine and Hydralazine. Continue Coreg for now.  Albumin x1, F/U labs in am..     GI consult: Ascites. Coffee ground emesis. Pt developed coffee-ground emesis and melena overnight 6/12 into 6/13 per RN/SLIM report with decline in hgb to 7.6. Plan: Urgent EGD today, NPO. Octreotide gtt, IV Ceftriaxone daily, Blood culture UA, Urine culture, Hold off steroids. Paracentesis for comfort today. MELD 3.0: 30 at 6/13/2025  5:53 AM. MELD-Na: 28 at 6/13/2025  5:53 AM. F/u labs.     6/13/25 IR Procedure note:   Findings: right sided paracentesis. 1670cc clear yellow fluid removed.   Anesthesia: Local.     ED Treatment-Medication Administration from 06/12/2025 1440 to 06/12/2025 2335         Date/Time Order Dose Route Action     06/12/2025 1646 folic acid 1 mg, thiamine (VITAMIN B1) 100 mg in dextrose 5 % 100 mL IV piggyback -- Intravenous New Bag     06/12/2025 2022 labetalol (NORMODYNE) injection 20 mg 20 mg Intravenous Given     06/12/2025 2216 labetalol (NORMODYNE) " injection 20 mg 20 mg Intravenous Given     06/12/2025 2147 ondansetron (ZOFRAN) injection 4 mg 4 mg Intravenous Given     06/12/2025 2217 LORazepam (ATIVAN) injection 1 mg 1 mg Intravenous Given            Scheduled Medications:  [Held by provider] amLODIPine, 5 mg, Oral, BID  carvedilol, 6.25 mg, Oral, BID With Meals  [Held by provider] docusate sodium, 100 mg, Oral, BID  [Held by provider] folic acid, 1 mg, Oral, Daily  [Held by provider] hydrALAZINE, 25 mg, Oral, Q8H JAISON  [Held by provider] insulin aspart protamine-insulin aspart, 8 Units, Subcutaneous, BID AC  insulin lispro, 1-5 Units, Subcutaneous, TID AC  insulin lispro, 1-5 Units, Subcutaneous, HS  [Held by provider] multivitamin-minerals, 1 tablet, Oral, Daily  [Held by provider] pancrelipase (Lip-Prot-Amyl), 24,000 Units, Oral, TID With Meals  pantoprazole, 40 mg, Intravenous, Q12H JAISON  [Held by provider] senna, 2 tablet, Oral, HS  sodium bicarbonate, 1,300 mg, Oral, TID after meals  [Held by provider] thiamine, 100 mg, Oral, Daily  albumin human (FLEXBUMIN) 25 % injection 50 g  Dose: 50 g  Freq: Once Route: IV  Start: 06/13/25 1030 End: 06/13/25 1041  cefTRIAXone (ROCEPHIN) 1,000 mg in dextrose 5 % 50 mL IVPB  Dose: 1,000 mg  Freq: Every 24 hours Route: IV  Start: 06/13/25 1630 End: 06/18/25 1629    Continuous IV Infusions:     PRN Meds:  bisacodyl, 10 mg, Rectal, Daily PRN  polyethylene glycol, 17 g, Oral, Daily PRN  trimethobenzamide, 200 mg, Intramuscular, Q6H PRN      ED Triage Vitals   Temperature Pulse Respirations Blood Pressure SpO2 Pain Score   06/12/25 1451 06/12/25 1451 06/12/25 1451 06/12/25 1451 06/12/25 1451 06/12/25 2341   98.2 °F (36.8 °C) 66 20 155/84 99 % No Pain     Weight (last 2 days)       Date/Time Weight    06/12/25 23:41:37 64.5 (142.2)    06/12/25 1451 64.3 (141.76)            Vital Signs (last 3 days)       Date/Time Temp Pulse Resp BP MAP (mmHg) SpO2 O2 Device Patient Position - Orthostatic VS CIWA-Ar Total Pain    06/13/25  16:12:41 98.2 °F (36.8 °C) 71 20 176/81 113 97 % None (Room air) Lying -- --    06/13/25 1545 97.5 °F (36.4 °C) 71 18 123/49 -- 99 % None (Room air) -- -- No Pain    06/13/25 1540 97.6 °F (36.4 °C) 65 18 122/55 -- 100 % None (Room air) -- -- --    06/13/25 10:10:31 -- 75 16 114/69 -- 99 % -- -- 4 --    06/13/25 0900 -- -- -- -- -- -- -- -- -- No Pain    06/13/25 0849 -- -- -- -- -- -- -- -- -- No Pain    06/13/25 0848 -- -- -- -- -- -- -- -- -- No Pain    06/13/25 07:24:48 98.5 °F (36.9 °C) 74 22 155/87 110 98 % None (Room air) Lying 7 --    06/13/25 0500 -- -- -- -- -- -- -- -- 6 --    06/13/25 02:41:29 -- 71 -- 156/90 112 98 % -- -- -- --    06/12/25 23:41:37 98.1 °F (36.7 °C) 67 16 185/91 122 99 % -- -- -- --    06/12/25 2341 -- -- -- -- -- -- None (Room air) -- -- No Pain    06/12/25 2215 -- -- -- 184/76 -- -- -- Lying -- --    06/12/25 2100 -- 64 18 214/101 -- 99 % -- -- -- --    06/12/25 1941 -- 63 18 210/99 -- 98 % None (Room air) Lying -- --    06/12/25 1716 -- 64 18 206/102 146 99 % None (Room air) Lying -- --    06/12/25 1451 98.2 °F (36.8 °C) 66 20 155/84 -- 99 % None (Room air) Sitting -- --           CIWA-Ar Score       Row Name 06/13/25 10:10:31 06/13/25 07:24:48 06/13/25 0500       CIWA-Ar    Nausea and Vomiting 2 5 5    Tactile Disturbances 0 0 0    Tremor 0 0 0    Auditory Disturbances 0 0 0    Paroxysmal Sweats 0 0 0    Visual Disturbances 0 0 0    Anxiety 1 1 0    Headache, Fullness in Head 0 0 0    Agitation 1 1 0    Orientation and Clouding of Sensorium 0 0 1    CIWA-Ar Total 4 7 6                    Pertinent Labs/Diagnostic Test Results:   Radiology:  IR INPATIENT Paracentesis   Final Interpretation by Vishnu Doshi MD (06/13 7851)   Ultrasound-guided paracentesis.      Signed and dictated by Marcia Dubois PA-C under the supervision of Dr. Doshi         Workstation performed: [id number]         CT chest abdomen pelvis wo contrast   Final Interpretation by Brodie Mahoney,  MD (06/12 2006)      Large volume abdominopelvic ascites, increased since 6/3/2025.      Unchanged findings of chronic pancreatitis.            Workstation performed: [id number]         XR abdomen obstruction series   Final Interpretation by Piero Kaiser MD (06/13 0832)      Relative paucity of bowel gas. Otherwise no definite evidence for bowel obstruction. Increasing haziness of the abdomen is consistent with ascites on follow-up CT.         Workstation performed: [id number]           Results from last 7 days   Lab Units 06/13/25  0553 06/12/25  1642   WBC Thousand/uL 11.44* 10.34*   HEMOGLOBIN g/dL 7.6* 8.3*   HEMATOCRIT % 21.7* 24.3*   PLATELETS Thousands/uL 209 245   TOTAL NEUT ABS Thousands/µL 8.93* 7.46         Results from last 7 days   Lab Units 06/13/25  0553 06/12/25  1819 06/12/25  1714   SODIUM mmol/L 136  --  130*   POTASSIUM mmol/L 5.1 4.7 6.7*   CHLORIDE mmol/L 109*  --  108   CO2 mmol/L 18*  --  17*   ANION GAP mmol/L 9  --  5   BUN mg/dL 33*  --  29*   CREATININE mg/dL 2.42*  --  2.43*   EGFR ml/min/1.73sq m 28  --  28   CALCIUM mg/dL 9.0  --  8.6     Results from last 7 days   Lab Units 06/13/25  0553 06/12/25  1714   AST U/L 33 54*   ALT U/L 33 36   ALK PHOS U/L 360* 348*   TOTAL PROTEIN g/dL 5.7* 5.5*   ALBUMIN g/dL 3.3* 3.4*   TOTAL BILIRUBIN mg/dL 14.20* 13.98*   AMMONIA umol/L  --  47     Results from last 7 days   Lab Units 06/13/25  1609 06/13/25  1106 06/13/25  0838 06/13/25  0724 06/13/25  0242   POC GLUCOSE mg/dl 151* 263* 292* 251* 280*     Results from last 7 days   Lab Units 06/13/25  0553 06/12/25  1714   GLUCOSE RANDOM mg/dL 260* 315*             Beta- Hydroxybutyrate   Date Value Ref Range Status   05/23/2025 0.21 0.20 - 0.60 mmol/L Final   04/26/2025 <0.05 0.02 - 0.27 mmol/L Final   02/05/2025 <0.05 0.02 - 0.27 mmol/L Final      Results from last 7 days   Lab Units 06/13/25  0553 06/12/25  1654 06/12/25  1654   PROTIME seconds 16.2*  --  15.2*   INR  1.28*  --  1.18   PTT seconds  --   28  --      Results from last 7 days   Lab Units 06/12/25  1714   LIPASE u/L <6*         Results from last 7 days   Lab Units 06/12/25  1657   ETHANOL LVL mg/dL <10     Past Medical History[1]  Present on Admission:   Alcoholic hepatitis with ascites   Primary hypertension   Chronic pancreatitis (HCC)   Generalized weakness      Admitting Diagnosis: Jaundice [R17]  Ascites [R18.8]  Age/Sex: 55 y.o. male    Network Utilization Review Department  ATTENTION: Please call with any questions or concerns to [phone] and carefully listen to the prompts so that you are directed to the right person. All voicemails are confidential.   For Discharge needs, contact Care Management DC Support Team at [phone] opt. 2  Send all requests for admission clinical reviews, approved or denied determinations and any other requests to dedicated fax number below belonging to the campus where the patient is receiving treatment. List of dedicated fax numbers for the Facilities:  FACILITY NAME UR FAX NUMBER   ADMISSION DENIALS (Administrative/Medical Necessity) [phone]   DISCHARGE SUPPORT TEAM (NETWORK) [phone]   PARENT CHILD HEALTH (Maternity/NICU/Pediatrics) [phone]   [facility] [phone]   [facility] [phone]   [facility] [phone]   [facility] [phone]   [facility] [phone]   [facility] [phone]   [facility] [phone]   [facility] [phone]   [facility] [phone]   [facility] [phone]   [facility] [phone]   [facility] [phone]              [1]   Past Medical History:  Diagnosis Date    Alcohol abuse     Chronic pancreatitis (HCC)      Diabetes mellitus (HCC)     Type 2    Non compliance w medication regimen     Pancreatitis     Paroxysmal A-fib (HCC)     Thrombocytopenia (HCC)

## 2025-06-13 NOTE — PLAN OF CARE
Problem: OCCUPATIONAL THERAPY ADULT  Goal: Performs self-care activities at highest level of function for planned discharge setting.  See evaluation for individualized goals.  Description: Treatment Interventions: ADL retraining, Functional transfer training, UE strengthening/ROM, Cognitive reorientation, Patient/family training, Endurance training, Equipment evaluation/education, Compensatory technique education, Activityengagement, Energy conservation  Equipment Recommended: Shower/Tub chair with back ($)       See flowsheet documentation for full assessment, interventions and recommendations.   Note: Limitation: Decreased ADL status, Decreased UE ROM, Decreased Safe judgement during ADL, Decreased UE strength, Decreased cognition, Decreased endurance, Decreased high-level ADLs, Decreased self-care trans  Prognosis: Fair  Assessment: Pt is a 55 y.o. male who presented to [facility] on 6/12/2025 with constipation for x1 week, bloating, and jaundice. Pt with diagnosis of alcoholic hepatitis with ascites. Pt  has a past medical history of Alcohol abuse, Chronic pancreatitis (HCC), Diabetes mellitus (HCC), Non compliance w medication regimen, Pancreatitis, Paroxysmal A-fib (HCC), and Thrombocytopenia (HCC). Pt greeted bedside for OT evaluation on 06/13/25. Pt resides with parents in a 2nd floor apartment with 2-3 KERRI with FOS to 2nd floor. PTA, Pt reports being mainly I with ADLs, however, has been requiring A with showering from brother. Pt completes functional mobility with no AD at baseline and supportive family assist with IADLs. Pt demonstrating the following occupational performance levels: Supervision with UB ADLs, Min A with LB ADLs, Supervision with bed mobility, Supervision with functional transfers, and Supervision with functional mobility with RW. Limitations that impact functional performance include decreased ADL status, UE ROM, UE strength, safe judgement during ADLs, cognition, endurance, self care  transfers, and high level ADLs. Occupational performance areas to address ADL retraining, functional transfer training, UE strengthening/ROM, endurance training, cognitive reorientation, Pt/caregiver education, equipment evaluation/education, compensatory technique education, energy conservation, and activity engagement . Pt would benefit from continued skilled OT services while in hospital to maximize independence with ADLs. Will continue to follow Pt's progress. Pt would benefit from level III resources (minimal intensity) upon DC to maximize safety and independence with ADLs and functional tasks of choice.     Rehab Resource Intensity Level, OT: III (Minimum Resource Intensity) (pending functional progress/available support)

## 2025-06-13 NOTE — PLAN OF CARE
Problem: PAIN - ADULT  Goal: Verbalizes/displays adequate comfort level or baseline comfort level  Description: Interventions:  - Encourage patient to monitor pain and request assistance  - Assess pain using appropriate pain scale  - Administer analgesics as ordered based on type and severity of pain and evaluate response  - Implement non-pharmacological measures as appropriate and evaluate response  - Consider cultural and social influences on pain and pain management  - Notify physician/advanced practitioner if interventions unsuccessful or patient reports new pain  - Educate patient/family on pain management process including their role and importance of  reporting pain   - Provide non-pharmacologic/complimentary pain relief interventions  Outcome: Progressing     Problem: INFECTION - ADULT  Goal: Absence or prevention of progression during hospitalization  Description: INTERVENTIONS:  - Assess and monitor for signs and symptoms of infection  - Monitor lab/diagnostic results  - Monitor all insertion sites, i.e. indwelling lines, tubes, and drains  - Monitor endotracheal if appropriate and nasal secretions for changes in amount and color  - Zanoni appropriate cooling/warming therapies per order  - Administer medications as ordered  - Instruct and encourage patient and family to use good hand hygiene technique  - Identify and instruct in appropriate isolation precautions for identified infection/condition  Outcome: Progressing  Goal: Absence of fever/infection during neutropenic period  Description: INTERVENTIONS:  - Monitor WBC  - Perform strict hand hygiene  - Limit to healthy visitors only  - No plants, dried, fresh or silk flowers with mcclure in patient room  Outcome: Progressing     Problem: SAFETY ADULT  Goal: Patient will remain free of falls  Description: INTERVENTIONS:  - Educate patient/family on patient safety including physical limitations  - Instruct patient to call for assistance with activity   -  Consider consulting OT/PT to assist with strengthening/mobility based on AM PAC & JH-HLM score  - Consult OT/PT to assist with strengthening/mobility   - Keep Call bell within reach  - Keep bed low and locked with side rails adjusted as appropriate  - Keep care items and personal belongings within reach  - Initiate and maintain comfort rounds  - Make Fall Risk Sign visible to staff  - Offer Toileting every 2 Hours, in advance of need  - Initiate/Maintain bed alarm  - Obtain necessary fall risk management equipment: yellow socks.  - Apply yellow socks and bracelet for high fall risk patients  - Consider moving patient to room near nurses station  Outcome: Progressing  Goal: Maintain or return to baseline ADL function  Description: INTERVENTIONS:  -  Assess patient's ability to carry out ADLs; assess patient's baseline for ADL function and identify physical deficits which impact ability to perform ADLs (bathing, care of mouth/teeth, toileting, grooming, dressing, etc.)  - Assess/evaluate cause of self-care deficits   - Assess range of motion  - Assess patient's mobility; develop plan if impaired  - Assess patient's need for assistive devices and provide as appropriate  - Encourage maximum independence but intervene and supervise when necessary  - Involve family in performance of ADLs  - Assess for home care needs following discharge   - Consider OT consult to assist with ADL evaluation and planning for discharge  - Provide patient education as appropriate  - Monitor functional capacity and physical performance, use of AM PAC & JH-HLM   - Monitor gait, balance and fatigue with ambulation    Outcome: Progressing  Goal: Maintains/Returns to pre admission functional level  Description: INTERVENTIONS:  - Perform AM-PAC 6 Click Basic Mobility/ Daily Activity assessment daily.  - Set and communicate daily mobility goal to care team and patient/family/caregiver.   - Collaborate with rehabilitation services on mobility goals  if consulted  - Perform Range of Motion 3 times a day.  - Reposition patient every 2 hours.  - Dangle patient 3 times a day  - Stand patient 3 times a day  - Ambulate patient 3 times a day  - Out of bed to chair 3 times a day   - Out of bed for meals 3 times a day  - Out of bed for toileting  - Record patient progress and toleration of activity level   Outcome: Progressing     Problem: DISCHARGE PLANNING  Goal: Discharge to home or other facility with appropriate resources  Description: INTERVENTIONS:  - Identify barriers to discharge w/patient and caregiver  - Arrange for needed discharge resources and transportation as appropriate  - Identify discharge learning needs (meds, wound care, etc.)  - Arrange for interpretive services to assist at discharge as needed  - Refer to Case Management Department for coordinating discharge planning if the patient needs post-hospital services based on physician/advanced practitioner order or complex needs related to functional status, cognitive ability, or social support system  Outcome: Progressing     Problem: Knowledge Deficit  Goal: Patient/family/caregiver demonstrates understanding of disease process, treatment plan, medications, and discharge instructions  Description: Complete learning assessment and assess knowledge base.  Interventions:  - Provide teaching at level of understanding  - Provide teaching via preferred learning methods  Outcome: Progressing     Problem: GASTROINTESTINAL - ADULT  Goal: Minimal or absence of nausea and/or vomiting  Description: INTERVENTIONS:  - Administer IV fluids if ordered to ensure adequate hydration  - Maintain NPO status until nausea and vomiting are resolved  - Nasogastric tube if ordered  - Administer ordered antiemetic medications as needed  - Provide nonpharmacologic comfort measures as appropriate  - Advance diet as tolerated, if ordered  - Consider nutrition services referral to assist patient with adequate nutrition and  appropriate food choices  Outcome: Progressing  Goal: Maintains or returns to baseline bowel function  Description: INTERVENTIONS:  - Assess bowel function  - Encourage oral fluids to ensure adequate hydration  - Administer IV fluids if ordered to ensure adequate hydration  - Administer ordered medications as needed  - Encourage mobilization and activity  - Consider nutritional services referral to assist patient with adequate nutrition and appropriate food choices  Outcome: Progressing  Goal: Maintains adequate nutritional intake  Description: INTERVENTIONS:  - Monitor percentage of each meal consumed  - Identify factors contributing to decreased intake, treat as appropriate  - Assist with meals as needed  - Monitor I&O, weight, and lab values if indicated  - Obtain nutrition services referral as needed  Outcome: Progressing     Problem: METABOLIC, FLUID AND ELECTROLYTES - ADULT  Goal: Electrolytes maintained within normal limits  Description: INTERVENTIONS:  - Monitor labs and assess patient for signs and symptoms of electrolyte imbalances  - Administer electrolyte replacement as ordered  - Monitor response to electrolyte replacements, including repeat lab results as appropriate  - Instruct patient on fluid and nutrition as appropriate  Outcome: Progressing  Goal: Fluid balance maintained  Description: INTERVENTIONS:  - Monitor labs   - Monitor I/O and WT  - Instruct patient on fluid and nutrition as appropriate  - Assess for signs & symptoms of volume excess or deficit  Outcome: Progressing  Goal: Glucose maintained within target range  Description: INTERVENTIONS:  - Monitor Blood Glucose as ordered  - Assess for signs and symptoms of hyperglycemia and hypoglycemia  - Administer ordered medications to maintain glucose within target range  - Assess nutritional intake and initiate nutrition service referral as needed  Outcome: Progressing     Problem: Nutrition/Hydration-ADULT  Goal: Nutrient/Hydration intake  appropriate for improving, restoring or maintaining nutritional needs  Description: Monitor and assess patient's nutrition/hydration status for malnutrition. Collaborate with interdisciplinary team and initiate plan and interventions as ordered.  Monitor patient's weight and dietary intake as ordered or per policy. Utilize nutrition screening tool and intervene as necessary. Determine patient's food preferences and provide high-protein, high-caloric foods as appropriate.     INTERVENTIONS:  - Monitor oral intake, urinary output, labs, and treatment plans  - Assess nutrition and hydration status and recommend course of action  - Evaluate amount of meals eaten  - Assist patient with eating if necessary   - Allow adequate time for meals  - Recommend/ encourage appropriate diets, oral nutritional supplements, and vitamin/mineral supplements  - Order, calculate, and assess calorie counts as needed  - Recommend, monitor, and adjust tube feedings and TPN/PPN based on assessed needs  - Assess need for intravenous fluids  - Provide specific nutrition/hydration education as appropriate  - Include patient/family/caregiver in decisions related to nutrition  Outcome: Progressing     Problem: Prexisting or High Potential for Compromised Skin Integrity  Goal: Skin integrity is maintained or improved  Description: INTERVENTIONS:  - Identify patients at risk for skin breakdown  - Assess and monitor skin integrity including under and around medical devices   - Assess and monitor nutrition and hydration status  - Monitor labs  - Assess for incontinence   - Turn and reposition patient  - Assist with mobility/ambulation  - Relieve pressure over keith prominences   - Avoid friction and shearing  - Provide appropriate hygiene as needed including keeping skin clean and dry  - Evaluate need for skin moisturizer/barrier cream  - Collaborate with interdisciplinary team  - Patient/family teaching  - Consider wound care consult    Assess:  -  Review Jose scale daily  - Clean and moisturize skin every day  - Inspect skin when repositioning, toileting, and assisting with ADLS  - Assess under medical devices such as Masimo every shift  - Assess extremities for adequate circulation and sensation     Bed Management:  - Have minimal linens on bed & keep smooth, unwrinkled  - Change linens as needed when moist or perspiring  - Avoid sitting or lying in one position for more than 2 hours while in bed?Keep HOB at 30 degrees   - Toileting:  - Offer bedside commode  - Assess for incontinence every 2 hours  - Use incontinent care products after each incontinent episode such as foam cleanser    Activity:  - Mobilize patient 3 times a day  - Encourage activity and walks on unit  - Encourage or provide ROM exercises   - Turn and reposition patient every 2 Hours  - Use appropriate equipment to lift or move patient in bed  - Instruct/ Assist with weight shifting every 2 hours when out of bed in chair  - Consider limitation of chair time 2 hour intervals    Skin Care:  - Avoid use of baby powder, tape, friction and shearing, hot water or constrictive clothing  - Relieve pressure over bony prominences using allevyn  - Do not massage red bony areas    Next Steps:  - Teach patient strategies to minimize risks such as weight shifting  - Consider consults to  interdisciplinary teams such as PT OT  Outcome: Progressing     Problem: Potential for Falls  Goal: Patient will remain free of falls  Description: INTERVENTIONS:  - Educate patient/family on patient safety including physical limitations  - Instruct patient to call for assistance with activity   - Consider consulting OT/PT to assist with strengthening/mobility based on AM PAC & JH-HLM score  - Consult OT/PT to assist with strengthening/mobility   - Keep Call bell within reach  - Keep bed low and locked with side rails adjusted as appropriate  - Keep care items and personal belongings within reach  - Initiate and  maintain comfort rounds  - Make Fall Risk Sign visible to staff  - Offer Toileting every 2 Hours, in advance of need  - Initiate/Maintain bed alarm  - Obtain necessary fall risk management equipment: yellow socks.  - Apply yellow socks and bracelet for high fall risk patients  - Consider moving patient to room near nurses station  Outcome: Progressing

## 2025-06-13 NOTE — NURSING NOTE
Patient had 350 ml of coffee ground emesis.  Patient complaining of nausea, no pain.  Vital signs stable at this time.  SLIM made aware @ 6364.

## 2025-06-13 NOTE — PHYSICAL THERAPY NOTE
PHYSICAL THERAPY EVALUATION          Patient Name: Wes Araujo  Today's Date: 6/13/2025 06/13/25 0848   PT Last Visit   PT Visit Date 06/13/25   Note Type   Note type Evaluation   Pain Assessment   Pain Assessment Tool 0-10   Pain Score No Pain   Restrictions/Precautions   Other Precautions Bed Alarm;Chair Alarm;Hard of hearing;Fall Risk   Home Living   Type of Home Apartment   Home Layout One level;Stairs to enter with rails   Bathroom Shower/Tub Tub/shower unit   Bathroom Toilet Standard   Additional Comments 2-3 KERRI w HR then additional FOS to second fl apt   Prior Function   Level of [place] Independent with ADLs;Independent with functional mobility;Needs assistance with IADLS   Lives With Family  (parents)   Receives Help From   (brother providing A for showers since dc from hospital)   Falls in the last 6 months 0   Comments ambulates without DME   General   Additional Pertinent History pt admitted 6/12/25 for alcoholic hepatitis w ascites. oob to chair orders. PMHx significant for T2DM, afib, CKD, ETOH abuse, [place] R>L, seizure   Cognition   Overall Cognitive Status WFL   Arousal/Participation Cooperative   Orientation Level Oriented to person;Oriented to place;Oriented to time   Following Commands Follows one step commands with increased time or repetition  (dt [place])   Comments use of  #[id number]   Bed Mobility   Rolling L 5  Supervision   Additional items Bedrails;Increased time required   Supine to Sit 5  Supervision   Additional items Bedrails;Increased time required   Sit to Supine 6  Modified independent   Additional items Increased time required   Transfers   Sit to Stand 5  Supervision   Additional items Increased time required;Other  (RW)   Stand to Sit 5  Supervision   Additional items Increased time required;Other  (RW)   Ambulation/Elevation   Gait pattern Decreased foot clearance;Short stride;Excessively slow   Gait Assistance 5  Supervision   Additional  items Assist x 1   Assistive Device Rolling walker   Distance 20'   Balance   Static Standing Fair   Dynamic Standing Fair -   Ambulatory Fair -   Endurance Deficit   Endurance Deficit No   Activity Tolerance   Activity Tolerance Patient tolerated treatment well   Medical Staff Made Aware OT   Nurse Made Aware yes   Assessment   Prognosis Good   Problem List Decreased mobility   Assessment Wes Araujo is a 55 y.o. male admitted to [facility] on 6/12/2025 for Alcoholic hepatitis with ascites. PT was consulted and pt was seen on 6/13/2025 for mobility assessment and d/c planning. Pt presents w readmission, low fall risk, language barrier requiring use of . At baseline is indep for ADLs and ambulation without DME. Reports he was walking w AD during last hospitalization but did not discharge home w equipment. Pt is currently functioning at a S for bed mobility, transfers and ambulation w RW. Pt demonstrated ability to ambulate limited household distances. No acute deficits impacting function. Further mobility deferred per RN requesting as pt w episodes of emesis and dark bowels w scope pending. Pt will benefit from continued skilled IP PT to address the above mentioned impairments  in order to maximize recovery and increase functional independence when completing mobility and ADLs. May benefit from RW given generalized deconditioning from multiple hospitalizations, to reduce fall risk and optimize mobility.   Barriers to Discharge None   Goals   Patient Goals get DME for home   STG Expiration Date 06/23/25   Short Term Goal #1 1) Pt will perform bed mobility mod I demonstrating appropriate technique 100% of the time in order to improve function. 2) Pt will perform all transfers mod I demonstrating safe technique 100% of the time in order to improve ability to negotiate safely in home environment. 3) Amb with least restrictive AD > 100'x1 with mod I in order to demonstrate ability to negotiate in home environment.  4) Improve overall strength and balance 1/2 grade in order to optimize ability to perform functional tasks and reduce fall risk. 5) Improve am-pac by 2 to demonstrate functional progress and return to baseline function/reduced caregiver burden. 6) Negotiate stairs using most appropriate technique and S in order to be able to negotiate safely in home environment.   Plan   Treatment/Interventions Functional transfer training;LE strengthening/ROM;Therapeutic exercise;Elevations;Patient/family training;Equipment eval/education;Gait training;Bed mobility;Continued evaluation;Spoke to nursing;OT   PT Frequency 2-3x/wk   Discharge Recommendation   Rehab Resource Intensity Level, PT III (Minimum Resource Intensity)   Equipment Recommended Walker   Walker Package Recommended Wheeled walker   Change/add to Walker Package? No   AM-PAC Basic Mobility Inpatient   Turning in Flat Bed Without Bedrails 3   Lying on Back to Sitting on Edge of Flat Bed Without Bedrails 3   Moving Bed to Chair 3   Standing Up From Chair Using Arms 3   Walk in Room 3   Climb 3-5 Stairs With Railing 3   Basic Mobility Inpatient Raw Score 18   Basic Mobility Standardized Score 41.05   [facility] Highest Level Of Mobility   -HLM Goal 6: Walk 10 steps or more   -HLM Achieved 6: Walk 10 steps or more   End of Consult   Patient Position at End of Consult Supine;Bed/Chair alarm activated;All needs within reach   History: co - morbidities, past experience, assist for Iadl's, current experience including fall risk  Exam: impairments in systems including multiple body structures involved; neuromuscular (balance, gait, transfers), cognition; activity limitations  (difficulties executing an action); participation restrictions (problems associated w involvement in life situations), am-pac  Clinical: unstable/unpredictable  Complexity: high    Yeimi Colmenares, PT

## 2025-06-13 NOTE — ASSESSMENT & PLAN NOTE
Wes Araujo is a 55-year-old male with a past medical history of hypertension, CKD, diabetes mellitus type 2, chronic pancreatitis secondary to chronic alcohol use, recent admission for alc hep with ZENAIDA on CKD who presented to [facility] on 6/12 for concern of recurrent abdominal distension with associated pain.   Labs on presentation significant for continued improvement is AST/ALT/ALP since last admission, however, with continue increase in bilirubin. INR 1.18 on presentation, with peak during last admission around 5.53 with dramatic improvement after Vitamin K. Kidney function appears to have improved from discharge and remains stable at this time.     Patient previously underwent serologic liver workup negative for alternative cause of liver injury, and ultimately thought to be 2/2 alc hep in setting of some degree of underlying fibrosis.  Ultimately recommend conservative management from GI perspective.  Will require close outpatient GI/hepatology follow-up to ensure LFTs have improved and also for consideration of liver biopsy versus alternative imaging to further risk stratify patient.  Also strongly recommend complete alcohol cessation moving forward.     Patient developed coffee-ground emesis and melena overnight 6/12 into 6/13 per RN/SLIM report with decline in hgb to 7.6. Current baseline 7-8, although has downtrended since earlier this year. Prior EGD/Colonoscopy completed 2/10/25 for evaluation of unintentional weight loss.  With EGD notable for small Dula Minda's lesion in the incisura which was clipped, gastritis with negative gastric biopsies, and normal duodenum with negative duodenal biopsies.  Colonoscopy notable for 2 tubular adenomas in the ascending colon and rectum with recommendation to repeat colonoscopy in 3 years.    Pamela's Discriminant Function - Control Prothrombin 13: 28.92 at 6/13/2025  5:53 AM  Calculated from:  Total Bilirubin: 14.2 mg/dL at 6/13/2025  5:53 AM  Prothrombin Time: 16.2  seconds at 6/13/2025  5:53 AM  MDF = (4.6 * (PT - Control PT)) + Bilirubin     Ascites:  Agree with paracentesis for patient comfort today - added on infectious studies  Would recommend albumin supplementation post paracentesis given underlying renal dysfunction  Monitor Cr closely  Will need outpatient nephrology follow up to evaluate for addition of diuretics given CKD  Outpatient hepatology follow up for ongoing monitoring of liver function as well as noninvasive fibrosis testing vs. Liver biopsy as dicussed above    Coffee-ground emesis:  Melena:  Plan for EGD today  Please keep NPO   1 time dose reglan if Qtc appropriate  IV PPI BID

## 2025-06-13 NOTE — CASE MANAGEMENT
Case Management Discharge Planning Note    Patient name Wes Araujo  Location [place] [address] /[place] 2 M* MRN [id number]  : 1970 Date 2025       Current Admission Date: 2025  Current Admission Diagnosis:Alcoholic hepatitis with ascites   Patient Active Problem List    Diagnosis Date Noted    CKD (chronic kidney disease) stage 4, GFR 15-29 ml/min (HCC) 2025    Alcohol abuse 2025    Constipation 2025    Coffee ground emesis 2025    Melena 2025    Chronic bilateral low back pain with bilateral sciatica 2025    Hyperkalemia 2025    Acute kidney injury (HCC) 2025    CKD stage 3b, GFR 30-44 ml/min (HCC) 2025    Other specified anemias 2025    Elevated LFTs 2025    Scottish  needed 2025    Electrolyte abnormality 2025    Abnormal LFTs 2025    Abdominal pain 2025    CKD stage 3a, GFR 45-59 ml/min (HCC) 2025    Alcohol-induced chronic pancreatitis (HCC) 2024    Toxic metabolic encephalopathy 2024    Seizure (HCC) 2022    Generalized weakness 2022    Bilateral hearing loss 2020    Acute renal failure superimposed on stage 3 chronic kidney disease (HCC) 2019    Anemia 2019    Hyponatremia 2019    History of atrial fibrillation 2019    Metabolic acidosis 2019    ZENAIDA (acute kidney injury) (HCC) 2019    Primary hypertension     Type 2 diabetes mellitus with hyperglycemia, with long-term current use of insulin (HCC)     Alcoholic hepatitis with ascites     Paroxysmal A-fib (HCC)     Chronic pancreatitis (HCC)       LOS (days): 1  Geometric Mean LOS (GMLOS) (days):   Days to GMLOS:     OBJECTIVE:  Risk of Unplanned Readmission Score: 41.46         Current admission status: Inpatient   Preferred Pharmacy:    [place] [place] [address]  Phone: [phone] Fax:  [phone]    [facility] [place] [address]  [facility],  [address] [facility],  [address] [place]  [place] [address]  Phone: [phone] Fax: [phone]    [id number] - [place] - [address]  [address] [place] [address]  Phone: [phone] Fax: [phone]    Primary Care Provider: Rush Kebede MD    Primary Insurance: [employer]  Secondary Insurance:     DISCHARGE DETAILS:    Discharge planning discussed with:: pt  Freedom of Choice: Yes  Comments - Freedom of Choice: [place] referrals to [facility] and order walker                     Requested Home Health Care         Is the patient interested in HHC at discharge?: Yes  Home Health Discipline requested:: Nursing, Occupational Therapy, Physical Therapy  Home Health Agency Name:: Other  Home Health Follow-Up Provider:: PCP  Home Health Services Needed:: Evaluate Functional Status and Safety, Gait/ADL Training, Strengthening/Theraputic Exercises to Improve Function  Homebound Criteria Met:: Uses an Assist Device (i.e. cane, walker, etc)  Supporting Clincal Findings:: Limited Endurance, Fatigues Easliy in Short Distances    DME Referral Provided  Referral made for DME?: Yes  DME referral completed for the following items:: Walker  DME Supplier Name:: [facility]              Treatment Team Recommendation: Home with Home Health Care          Additional Comments: LSW covering case today. Met with pt and offer HOST - pt needs to think about it. Pt was agreeable to blanket referrasl for [facility] and this was done today. Also order walker - will need to deliver once approved.

## 2025-06-13 NOTE — ASSESSMENT & PLAN NOTE
Wes Araujo is a 55-year-old male with a past medical history of hypertension, CKD, diabetes mellitus type 2, chronic pancreatitis secondary to chronic alcohol use, recent admission for alc hep with ZENAIDA on CKD who presented to [facility] on 6/12 for concern of recurrent abdominal distension with associated pain.   Labs on presentation significant for continued improvement is AST/ALT/ALP since last admission, however, with continue increase in bilirubin. INR 1.18 on presentation, with peak during last admission around 5.53 with dramatic improvement after Vitamin K. Kidney function appears to have improved from discharge and remains stable at this time.     Patient previously underwent serologic liver workup negative for alternative cause of liver injury, and ultimately thought to be 2/2 alc hep in setting of some degree of underlying fibrosis.  Ultimately recommend conservative management from GI perspective.  Will require close outpatient GI/hepatology follow-up to ensure LFTs have improved and also for consideration of liver biopsy versus alternative imaging to further risk stratify patient.  Also strongly recommend complete alcohol cessation moving forward.     Patient developed coffee-ground emesis and melena overnight 6/12 into 6/13 per RN/SLIM report with decline in hgb to 7.6. Current baseline 7-8, although has downtrended since earlier this year. Prior EGD/Colonoscopy completed 2/10/25 for evaluation of unintentional weight loss.  With EGD notable for small Dula Minda's lesion in the incisura which was clipped, gastritis with negative gastric biopsies, and normal duodenum with negative duodenal biopsies.  Colonoscopy notable for 2 tubular adenomas in the ascending colon and rectum with recommendation to repeat colonoscopy in 3 years.    Pamela's Discriminant Function - Control Prothrombin 13: 28.92 at 6/13/2025  5:53 AM  Calculated from:  Total Bilirubin: 14.2 mg/dL at 6/13/2025  5:53 AM  Prothrombin Time: 16.2  seconds at 6/13/2025  5:53 AM  MDF = (4.6 * (PT - Control PT)) + Bilirubin     Ascites:  Agree with paracentesis for patient comfort today - added on infectious studies  Would recommend albumin supplementation post paracentesis given underlying renal dysfunction  Monitor Cr closely  Will need outpatient nephrology follow up to evaluate for addition of diuretics given CKD  Outpatient hepatology follow up for ongoing monitoring of liver function as well as noninvasive fibrosis testing vs. Liver biopsy as dicussed above    Coffee-ground emesis:  Melena:  Plan for EGD today  Please keep NPO   1 time dose reglan if Qtc appropriate  IV PPI BID

## 2025-06-13 NOTE — BRIEF OP NOTE (RAD/CATH)
IR Paracentesis Procedure Note    PATIENT NAME: Wes Araujo  : 1970  MRN: [id number]    Pre-op Diagnosis:   1. Ascites    2. Jaundice    3. Coffee ground emesis    4. Melena      Post-op Diagnosis:   1. Ascites    2. Jaundice    3. Coffee ground emesis    4. Melena        Provider:  Marcia Dubois PA-C    No qualified resident was available, Resident is only observing    Estimated Blood Loss: minimal    Findings: right sided paracentesis. 1670cc clear yellow fluid removed.     Specimens: sent    Complications:  none immediate    Anesthesia: local    Marcia Dubois PA-C     Date: 2025  Time: 10:01 AM

## 2025-06-13 NOTE — ASSESSMENT & PLAN NOTE
Patient with a history of chronic pancreatitis secondary to alcohol   Chronic pancreatitis stable on CT imaging on admission

## 2025-06-13 NOTE — ASSESSMENT & PLAN NOTE
Wes Araujo is a 55-year-old male with a past medical history of hypertension, CKD, diabetes mellitus type 2, chronic pancreatitis secondary to chronic alcohol use, recent admission for alc hep with ZENAIDA on CKD who presented to [facility] on 6/12 for concern of recurrent abdominal distension with associated pain.   Labs on presentation significant for continued improvement is AST/ALT/ALP since last admission, however, with continue increase in bilirubin. INR 1.18 on presentation, with peak during last admission around 5.53 with dramatic improvement after Vitamin K. Kidney function appears to have improved from discharge and remains stable at this time.     Patient previously underwent serologic liver workup negative for alternative cause of liver injury, and ultimately thought to be 2/2 alc hep in setting of some degree of underlying fibrosis.  Ultimately recommend conservative management from GI perspective.  Will require close outpatient GI/hepatology follow-up to ensure LFTs have improved and also for consideration of liver biopsy versus alternative imaging to further risk stratify patient.  Also strongly recommend complete alcohol cessation moving forward.     Patient developed coffee-ground emesis and melena overnight 6/12 into 6/13 per RN/SLIM report with decline in hgb to 7.6. Current baseline 7-8, although has downtrended since earlier this year. Prior EGD/Colonoscopy completed 2/10/25 for evaluation of unintentional weight loss.  With EGD notable for small Dula Minda's lesion in the incisura which was clipped, gastritis with negative gastric biopsies, and normal duodenum with negative duodenal biopsies.  Colonoscopy notable for 2 tubular adenomas in the ascending colon and rectum with recommendation to repeat colonoscopy in 3 years.    Pamela's Discriminant Function - Control Prothrombin 13: 28.92 at 6/13/2025  5:53 AM  Calculated from:  Total Bilirubin: 14.2 mg/dL at 6/13/2025  5:53 AM  Prothrombin Time: 16.2  seconds at 6/13/2025  5:53 AM  MDF = (4.6 * (PT - Control PT)) + Bilirubin     Ascites:  Agree with paracentesis for patient comfort today - added on infectious studies  Would recommend albumin supplementation post paracentesis given underlying renal dysfunction  Monitor Cr closely  Will need outpatient nephrology follow up to evaluate for addition of diuretics given CKD  Outpatient hepatology follow up for ongoing monitoring of liver function as well as noninvasive fibrosis testing vs. Liver biopsy as dicussed above    Coffee-ground emesis:  Melena:  Plan for EGD today  Please keep NPO   1 time dose reglan if Qtc appropriate  IV PPI BID

## 2025-06-13 NOTE — NURSING NOTE
Patient had emesis output of 650 ml - brown. Also,bladder scan showed 483 ml urine. Patient refused straight cath. SLIM made aware of both.

## 2025-06-13 NOTE — PLAN OF CARE
Problem: PAIN - ADULT  Goal: Verbalizes/displays adequate comfort level or baseline comfort level  Description: Interventions:  - Encourage patient to monitor pain and request assistance  - Assess pain using appropriate pain scale  - Administer analgesics as ordered based on type and severity of pain and evaluate response  - Implement non-pharmacological measures as appropriate and evaluate response  - Consider cultural and social influences on pain and pain management  - Notify physician/advanced practitioner if interventions unsuccessful or patient reports new pain  - Educate patient/family on pain management process including their role and importance of  reporting pain   - Provide non-pharmacologic/complimentary pain relief interventions  Outcome: Progressing     Problem: INFECTION - ADULT  Goal: Absence or prevention of progression during hospitalization  Description: INTERVENTIONS:  - Assess and monitor for signs and symptoms of infection  - Monitor lab/diagnostic results  - Monitor all insertion sites, i.e. indwelling lines, tubes, and drains  - Monitor endotracheal if appropriate and nasal secretions for changes in amount and color  - House appropriate cooling/warming therapies per order  - Administer medications as ordered  - Instruct and encourage patient and family to use good hand hygiene technique  - Identify and instruct in appropriate isolation precautions for identified infection/condition  Outcome: Progressing  Goal: Absence of fever/infection during neutropenic period  Description: INTERVENTIONS:  - Monitor WBC  - Perform strict hand hygiene  - Limit to healthy visitors only  - No plants, dried, fresh or silk flowers with mcclure in patient room  Outcome: Progressing     Problem: SAFETY ADULT  Goal: Patient will remain free of falls  Description: INTERVENTIONS:  - Educate patient/family on patient safety including physical limitations  - Instruct patient to call for assistance with activity   -  Consider consulting OT/PT to assist with strengthening/mobility based on AM PAC & JH-HLM score  - Consult OT/PT to assist with strengthening/mobility   - Keep Call bell within reach  - Keep bed low and locked with side rails adjusted as appropriate  - Keep care items and personal belongings within reach  - Initiate and maintain comfort rounds  - Make Fall Risk Sign visible to staff  - Offer Toileting every 2 Hours, in advance of need  - Initiate/Maintain bed alarm  - Obtain necessary fall risk management equipment: yellow socks.  - Apply yellow socks and bracelet for high fall risk patients  - Consider moving patient to room near nurses station  Outcome: Progressing  Goal: Maintain or return to baseline ADL function  Description: INTERVENTIONS:  -  Assess patient's ability to carry out ADLs; assess patient's baseline for ADL function and identify physical deficits which impact ability to perform ADLs (bathing, care of mouth/teeth, toileting, grooming, dressing, etc.)  - Assess/evaluate cause of self-care deficits   - Assess range of motion  - Assess patient's mobility; develop plan if impaired  - Assess patient's need for assistive devices and provide as appropriate  - Encourage maximum independence but intervene and supervise when necessary  - Involve family in performance of ADLs  - Assess for home care needs following discharge   - Consider OT consult to assist with ADL evaluation and planning for discharge  - Provide patient education as appropriate  - Monitor functional capacity and physical performance, use of AM PAC & JH-HLM   - Monitor gait, balance and fatigue with ambulation    Outcome: Progressing  Goal: Maintains/Returns to pre admission functional level  Description: INTERVENTIONS:  - Perform AM-PAC 6 Click Basic Mobility/ Daily Activity assessment daily.  - Set and communicate daily mobility goal to care team and patient/family/caregiver.   - Collaborate with rehabilitation services on mobility goals  if consulted  - Perform Range of Motion 3 times a day.  - Reposition patient every 2 hours.  - Dangle patient 3 times a day  - Stand patient 3 times a day  - Ambulate patient 3 times a day  - Out of bed to chair 3 times a day   - Out of bed for meals 3 times a day  - Out of bed for toileting  - Record patient progress and toleration of activity level   Outcome: Progressing     Problem: DISCHARGE PLANNING  Goal: Discharge to home or other facility with appropriate resources  Description: INTERVENTIONS:  - Identify barriers to discharge w/patient and caregiver  - Arrange for needed discharge resources and transportation as appropriate  - Identify discharge learning needs (meds, wound care, etc.)  - Arrange for interpretive services to assist at discharge as needed  - Refer to Case Management Department for coordinating discharge planning if the patient needs post-hospital services based on physician/advanced practitioner order or complex needs related to functional status, cognitive ability, or social support system  Outcome: Progressing     Problem: Knowledge Deficit  Goal: Patient/family/caregiver demonstrates understanding of disease process, treatment plan, medications, and discharge instructions  Description: Complete learning assessment and assess knowledge base.  Interventions:  - Provide teaching at level of understanding  - Provide teaching via preferred learning methods  Outcome: Progressing     Problem: GASTROINTESTINAL - ADULT  Goal: Minimal or absence of nausea and/or vomiting  Description: INTERVENTIONS:  - Administer IV fluids if ordered to ensure adequate hydration  - Maintain NPO status until nausea and vomiting are resolved  - Nasogastric tube if ordered  - Administer ordered antiemetic medications as needed  - Provide nonpharmacologic comfort measures as appropriate  - Advance diet as tolerated, if ordered  - Consider nutrition services referral to assist patient with adequate nutrition and  appropriate food choices  Outcome: Progressing  Goal: Maintains or returns to baseline bowel function  Description: INTERVENTIONS:  - Assess bowel function  - Encourage oral fluids to ensure adequate hydration  - Administer IV fluids if ordered to ensure adequate hydration  - Administer ordered medications as needed  - Encourage mobilization and activity  - Consider nutritional services referral to assist patient with adequate nutrition and appropriate food choices  Outcome: Progressing  Goal: Maintains adequate nutritional intake  Description: INTERVENTIONS:  - Monitor percentage of each meal consumed  - Identify factors contributing to decreased intake, treat as appropriate  - Assist with meals as needed  - Monitor I&O, weight, and lab values if indicated  - Obtain nutrition services referral as needed  Outcome: Progressing     Problem: METABOLIC, FLUID AND ELECTROLYTES - ADULT  Goal: Electrolytes maintained within normal limits  Description: INTERVENTIONS:  - Monitor labs and assess patient for signs and symptoms of electrolyte imbalances  - Administer electrolyte replacement as ordered  - Monitor response to electrolyte replacements, including repeat lab results as appropriate  - Instruct patient on fluid and nutrition as appropriate  Outcome: Progressing  Goal: Fluid balance maintained  Description: INTERVENTIONS:  - Monitor labs   - Monitor I/O and WT  - Instruct patient on fluid and nutrition as appropriate  - Assess for signs & symptoms of volume excess or deficit  Outcome: Progressing  Goal: Glucose maintained within target range  Description: INTERVENTIONS:  - Monitor Blood Glucose as ordered  - Assess for signs and symptoms of hyperglycemia and hypoglycemia  - Administer ordered medications to maintain glucose within target range  - Assess nutritional intake and initiate nutrition service referral as needed  Outcome: Progressing

## 2025-06-13 NOTE — ANESTHESIA PREPROCEDURE EVALUATION
Procedure:  EGD    Relevant Problems   CARDIO   (+) Paroxysmal A-fib (HCC)   (+) Primary hypertension      ENDO   (+) Type 2 diabetes mellitus with hyperglycemia, with long-term current use of insulin (HCC)      GI/HEPATIC   (+) Alcohol-induced chronic pancreatitis (HCC)   (+) Chronic pancreatitis (HCC)   (+) Coffee ground emesis      /RENAL   (+) ZENAIDA (acute kidney injury) (HCC)   (+) Acute kidney injury (HCC)   (+) Acute renal failure superimposed on stage 3 chronic kidney disease (HCC)   (+) CKD (chronic kidney disease) stage 4, GFR 15-29 ml/min (HCC)   (+) CKD stage 3a, GFR 45-59 ml/min (HCC)   (+) CKD stage 3b, GFR 30-44 ml/min (HCC)      HEMATOLOGY   (+) Anemia   (+) Other specified anemias      MUSCULOSKELETAL   (+) Chronic bilateral low back pain with bilateral sciatica      NEURO/PSYCH   (+) Chronic bilateral low back pain with bilateral sciatica   (+) Seizure (HCC)        Physical Exam    Airway     Mallampati score: I  TM Distance: >3 FB  Neck ROM: full      Cardiovascular  Cardiovascular exam normal    Dental   No notable dental hx     Pulmonary  Pulmonary exam normal     Neurological      Other Findings        Anesthesia Plan  ASA Score- 3     Anesthesia Type- IV sedation with anesthesia with ASA Monitors.         Additional Monitors:     Airway Plan:            Plan Factors-Exercise tolerance (METS): >4 METS.    Chart reviewed.   Existing labs reviewed. Patient summary reviewed.    Patient is a current smoker.  Patient instructed to abstain from smoking on day of procedure. Patient did not smoke on day of surgery.            Induction- intravenous.    Postoperative Plan-     Perioperative Resuscitation Plan - Level 1 - Full Code.       Informed Consent- Anesthetic plan and risks discussed with patient.  I personally reviewed this patient with the CRNA. Discussed and agreed on the Anesthesia Plan with the CRNA..      NPO Status:  Vitals Value Taken Time   Date of last liquid 06/12/25 06/13/25 14:59    Time of last liquid 0000 06/13/25 14:59   Date of last solid 06/12/25 06/13/25 14:59   Time of last solid 0000 06/13/25 14:59

## 2025-06-13 NOTE — ASSESSMENT & PLAN NOTE
Pt reports that since discharge he has been having difficulty with ambulation  Typically he ambulates without assistive devices, but he reports he has been struggling  PT/OT eval

## 2025-06-13 NOTE — ASSESSMENT & PLAN NOTE
Lab Results   Component Value Date    EGFR 28 06/12/2025    EGFR 22 06/06/2025    EGFR 23 06/05/2025    CREATININE 2.43 (H) 06/12/2025    CREATININE 3.01 (H) 06/06/2025    CREATININE 2.89 (H) 06/05/2025   At baseline

## 2025-06-13 NOTE — ASSESSMENT & PLAN NOTE
Last bowel movement prior to presentation 6/7 per patient report. Had black stools overnight per bedside RN.

## 2025-06-13 NOTE — H&P
"H&P - Hospitalist   Name: Wes Araujo 55 y.o. male I MRN: [id number]  Unit/Bed#: [place] [address] -01 I Date of Admission: 6/12/2025   Date of Service: 6/13/2025 I Hospital Day: 1     Assessment & Plan  Alcoholic hepatitis with ascites  Pt with PMHx of alcoholic liver disease presented to the ED secondary to worsening abdominal pain/distension. Pt is jaundice on exam.  Of note, pt recently admitted 5/23 - 6/06 secondary to toxic metabolic encephalopathy due to to hyperglycemia with HHNK, acute kidney injury, and acute liver failure   He denies any fever, chills, or myalgias  CT a/p demonstrating \"Large volume abdominopelvic ascites, increased since 6/3/2025.\"  LFTs appear approximately baseline, but t bili noted to be elevated to 13.98  INR 1.18  Ammonia 47  Continue to trend CMP  Paracentesis ordered  GI consult pending  Generalized weakness  Pt reports that since discharge he has been having difficulty with ambulation  Typically he ambulates without assistive devices, but he reports he has been struggling  PT/OT eval  Primary hypertension  Continue amlodipine 5 mg twice daily, hydralazine 25 mg 3 times daily, and Coreg 6.25 twice daily   Type 2 diabetes mellitus with hyperglycemia, with long-term current use of insulin ([facility])  Lab Results   Component Value Date    HGBA1C 9.6 (H) 05/26/2025       Recent Labs     06/13/25  0242   POCGLU 280*       Blood Sugar Average: Last 72 hrs:  (P) 280  Continue home insulin regimen of 8 units humulin 70/30 bid  SSI with accucheks    Chronic pancreatitis (HCC)  Patient with a history of chronic pancreatitis secondary to alcohol   Chronic pancreatitis stable on CT imaging on admission  History of atrial fibrillation  History of atrial fibrillation on metoprolol  Not on anticoagulation at baseline  CKD (chronic kidney disease) stage 4, GFR 15-29 ml/min ([facility])  Lab Results   Component Value Date    EGFR 28 06/12/2025    EGFR 22 06/06/2025    EGFR 23 06/05/2025    CREATININE 2.43 (H) " "06/12/2025    CREATININE 3.01 (H) 06/06/2025    CREATININE 2.89 (H) 06/05/2025   At baseline  Alcohol abuse  Pt with history of alcohol abuse with withdrawal. He states he typically drinks multiple beer a day, but states he has not been drinking since he was discharged from the hospital last week  ETOH <10  Monitor on CIWA for now  Vitamin supplementation  Encourage continued cessation  Constipation  Pt reports no bowel movement for 6 days  Bowel regimen initiated      VTE Pharmacologic Prophylaxis: VTE Score: 1 Low Risk (Score 0-2) - Encourage Ambulation.  Code Status: Level 1 - Full Code   Discussion with family: Patient declined call to .     Anticipated Length of Stay: Patient will be admitted on an inpatient basis with an anticipated length of stay of greater than 2 midnights secondary to large abdominal ascites in the setting of known alcoholic liver disease.    History of Present Illness   Chief Complaint: \"My stomach is bigger than ever\"    Wes Araujo is a 55 y.o. male who presents with ascites secondary to known alcoholic liver disease, hyperbilirubinemia. Pt presented to the ED secondary to worsening abdominal distension and pain. He states that his stomach is \"bigger than ever.\" He also expresses concern about still feeling generally unwell and weak since discharge. He states that he typically can ambulate without assistive devices normally, but lately he has been struggling to do so. Pt also reporting ongoing jaundice. He denies any new fever or chills.     Review of Systems   Constitutional:  Negative for appetite change, chills, diaphoresis, fatigue and fever.   HENT:  Negative for congestion, rhinorrhea and sore throat.    Eyes:  Negative for photophobia and visual disturbance.   Respiratory:  Negative for cough, shortness of breath and wheezing.    Cardiovascular:  Negative for chest pain, palpitations and leg swelling.   Gastrointestinal:  Positive for abdominal distention and " abdominal pain. Negative for blood in stool, constipation, diarrhea, nausea and vomiting.   Genitourinary:  Negative for dysuria and hematuria.   Musculoskeletal:  Negative for arthralgias, back pain, myalgias and neck stiffness.   Skin:  Negative for color change and rash.   Neurological:  Negative for dizziness, seizures, syncope, weakness, light-headedness and headaches.   Psychiatric/Behavioral:  Negative for agitation, behavioral problems and confusion. The patient is not nervous/anxious.    All other systems reviewed and are negative.      Historical Information   Past Medical History[1]  Past Surgical History[2]  Social History[3]  E-Cigarette/Vaping    E-Cigarette Use Unknown If Ever Used      E-Cigarette/Vaping Substances    Nicotine No     THC No     CBD No     Flavoring No     Other No     Unknown No      Family History[4]  Social History:  Marital Status: Single   Patient Pre-hospital Living Situation: Home  Patient Pre-hospital Level of Mobility: walks  Patient Pre-hospital Diet Restrictions: diabetic    Meds/Allergies   I have reviewed home medications using recent Epic encounter.  Prior to Admission medications    Medication Sig Start Date End Date Taking? Authorizing Provider   Alcohol Swabs 70 % PADS May substitute brand based on insurance coverage. Check glucose TID. 3/18/25   Johny S Prechtel, DO   amLODIPine (NORVASC) 5 mg tablet Take 1 tablet (5 mg total) by mouth 2 (two) times a day 3/18/25 5/17/25  Johny Sung, DO   Blood Glucose Monitoring Suppl (OneTouch Verio Reflect) w/Device KIT May substitute brand based on insurance coverage. Check glucose TID. 3/18/25   Johny Ellisonel, DO   carvedilol (COREG) 6.25 mg tablet Take 1 tablet (6.25 mg total) by mouth 2 (two) times a day with meals 6/6/25   Mojgan Young MD   folic acid ( Folic Acid) 1 mg tablet Take 1 tablet (1 mg total) by mouth daily 6/6/25 7/6/25  Mojgan Young MD   glucose blood (OneTouch Verio) test strip May  "substitute brand based on insurance coverage. Check glucose TID. 3/18/25   Johny Ellisonel, DO   hydrALAZINE (APRESOLINE) 25 mg tablet Take 1 tablet (25 mg total) by mouth every 8 (eight) hours 3/18/25 5/17/25  Johny Ellisonel, DO   insulin NPH-insulin regular (HumuLIN 70/30) 100 units/mL subcutaneous injection Inject 8 Units under the skin 2 (two) times a day before meals Do not give yourself insulin if you are not going to eat. 6/6/25   Mojgan Young MD   Insulin Syringe-Needle U-100 (BD Insulin Syringe U/F) 31G X 5/16\" 0.3 ML MISC For use with insulin. Pharmacy may dispense brand covered by insurance. 3/18/25   Johny Sung, DO   Multiple Vitamin (multivitamin) tablet Take 1 tablet by mouth daily 1/27/24 2/26/24  Julio Mcneil MD   OneTouch Delica Lancets 33G MISC May substitute brand based on insurance coverage. Check glucose TID. 3/18/25   Johny Sung, DO   pancrelipase, Lip-Prot-Amyl, (CREON) 24,000 units Take 24,000 units of lipase by mouth 3 (three) times a day with meals 10/26/24   Fannie Hernandez MD   sodium bicarbonate 650 mg tablet Take 2 tablets (1,300 mg total) by mouth 3 (three) times daily after meals 6/6/25   Mojgan Young MD   thiamine 50 MG tablet Take 1 tablet (50 mg total) by mouth daily 10/26/24   Fannie Hernandez MD     No Known Allergies    Objective :  Temp:  [98.1 °F (36.7 °C)-98.2 °F (36.8 °C)] 98.1 °F (36.7 °C)  HR:  [63-71] 71  BP: (155-214)/() 156/90  Resp:  [16-20] 16  SpO2:  [98 %-99 %] 98 %  O2 Device: None (Room air)    Physical Exam  Vitals and nursing note reviewed.   Constitutional:       General: He is not in acute distress.     Appearance: He is well-developed. He is ill-appearing.   HENT:      Head: Normocephalic and atraumatic.      Nose: Nose normal. No congestion.      Mouth/Throat:      Mouth: Mucous membranes are moist.      Pharynx: Oropharynx is clear.     Eyes:      Conjunctiva/sclera: Conjunctivae " normal.       Cardiovascular:      Rate and Rhythm: Normal rate and regular rhythm.      Heart sounds: Normal heart sounds. No murmur heard.     No friction rub. No gallop.   Pulmonary:      Effort: Pulmonary effort is normal. No respiratory distress.      Breath sounds: Normal breath sounds. No wheezing, rhonchi or rales.   Abdominal:      General: Bowel sounds are normal. There is distension.      Tenderness: There is abdominal tenderness.     Musculoskeletal:      Cervical back: Neck supple.      Right lower leg: No edema.      Left lower leg: No edema.     Skin:     General: Skin is warm and dry.      Capillary Refill: Capillary refill takes less than 2 seconds.      Coloration: Skin is jaundiced.     Neurological:      General: No focal deficit present.      Mental Status: He is alert and oriented to person, place, and time. Mental status is at baseline.     Psychiatric:         Mood and Affect: Mood normal.         Behavior: Behavior normal.          Lines/Drains:            Lab Results: I have reviewed the following results:  Results from last 7 days   Lab Units 06/12/25  1642   WBC Thousand/uL 10.34*   HEMOGLOBIN g/dL 8.3*   HEMATOCRIT % 24.3*   PLATELETS Thousands/uL 245   SEGS PCT % 72   LYMPHO PCT % 13*   MONO PCT % 9   EOS PCT % 4     Results from last 7 days   Lab Units 06/12/25  1819 06/12/25  1714   SODIUM mmol/L  --  130*   POTASSIUM mmol/L 4.7 6.7*   CHLORIDE mmol/L  --  108   CO2 mmol/L  --  17*   BUN mg/dL  --  29*   CREATININE mg/dL  --  2.43*   ANION GAP mmol/L  --  5   CALCIUM mg/dL  --  8.6   ALBUMIN g/dL  --  3.4*   TOTAL BILIRUBIN mg/dL  --  13.98*   ALK PHOS U/L  --  348*   ALT U/L  --  36   AST U/L  --  54*   GLUCOSE RANDOM mg/dL  --  315*     Results from last 7 days   Lab Units 06/12/25  1654   INR  1.18     Results from last 7 days   Lab Units 06/13/25  0242 06/06/25  1129 06/06/25  0758   POC GLUCOSE mg/dl 280* 136 225*     Lab Results   Component Value Date    HGBA1C 9.6 (H)  05/26/2025    HGBA1C 9.8 (H) 02/05/2025    HGBA1C 11.2 (H) 10/23/2024           Imaging Results Review: I reviewed radiology reports from this admission including: CT chest and CT abdomen/pelvis.  Other Study Results Review: No additional pertinent studies reviewed.    Administrative Statements   I have spent a total time of 75 minutes in caring for this patient on the day of the visit/encounter including Diagnostic results, Impressions, Counseling / Coordination of care, Documenting in the medical record, Reviewing/placing orders in the medical record (including tests, medications, and/or procedures), Obtaining or reviewing history  , and Communicating with other healthcare professionals .    ** Please Note: This note has been constructed using a voice recognition system. **         [1]   Past Medical History:  Diagnosis Date    Alcohol abuse     Chronic pancreatitis (HCC)     Diabetes mellitus (HCC)     Type 2    Non compliance w medication regimen     Pancreatitis     Paroxysmal A-fib (HCC)     Thrombocytopenia (HCC)    [2]   Past Surgical History:  Procedure Laterality Date    INCISION AND DRAINAGE OF WOUND N/A 8/16/2020    Procedure: INCISION AND DRAINAGE (I&D) HEAD/FACE;  Surgeon: Estrada Walton DMD;  Location: [facility];  Service: Maxillofacial    IR BIOPSY BONE MARROW  2/7/2019    IR PARACENTESIS  6/3/2025    REMOVAL OF IMPACTED TOOTH - COMPLETELY BONY N/A 8/16/2020    Procedure: EXTRACTION TEETH MULTIPLE #2, 3;  Surgeon: Estrada Walton DMD;  Location: [facility];  Service: Maxillofacial   [3]   Social History  Tobacco Use    Smoking status: Unknown     Passive exposure: Past   Vaping Use    Vaping status: Unknown   Substance and Sexual Activity    Alcohol use: Yes    Drug use: Not Currently     Types: Cocaine   [4]   Family History  Problem Relation Name Age of Onset    Diabetes Mother      Hypertension Mother      Hyperlipidemia Father

## 2025-06-14 LAB
ALBUMIN SERPL BCG-MCNC: 3.5 G/DL (ref 3.5–5)
ALP SERPL-CCNC: 292 U/L (ref 34–104)
ALT SERPL W P-5'-P-CCNC: 27 U/L (ref 7–52)
ANION GAP SERPL CALCULATED.3IONS-SCNC: 8 MMOL/L (ref 4–13)
AST SERPL W P-5'-P-CCNC: 36 U/L (ref 13–39)
BASOPHILS # BLD AUTO: 0.05 THOUSANDS/ÂΜL (ref 0–0.1)
BASOPHILS NFR BLD AUTO: 0 % (ref 0–1)
BILIRUB SERPL-MCNC: 12.79 MG/DL (ref 0.2–1)
BUN SERPL-MCNC: 37 MG/DL (ref 5–25)
CALCIUM SERPL-MCNC: 9 MG/DL (ref 8.4–10.2)
CHLORIDE SERPL-SCNC: 108 MMOL/L (ref 96–108)
CO2 SERPL-SCNC: 19 MMOL/L (ref 21–32)
CREAT SERPL-MCNC: 2.81 MG/DL (ref 0.6–1.3)
EOSINOPHIL # BLD AUTO: 0.24 THOUSAND/ÂΜL (ref 0–0.61)
EOSINOPHIL NFR BLD AUTO: 2 % (ref 0–6)
ERYTHROCYTE [DISTWIDTH] IN BLOOD BY AUTOMATED COUNT: 16.9 % (ref 11.6–15.1)
GFR SERPL CREATININE-BSD FRML MDRD: 24 ML/MIN/1.73SQ M
GLUCOSE SERPL-MCNC: 225 MG/DL (ref 65–140)
GLUCOSE SERPL-MCNC: 228 MG/DL (ref 65–140)
GLUCOSE SERPL-MCNC: 267 MG/DL (ref 65–140)
GLUCOSE SERPL-MCNC: 287 MG/DL (ref 65–140)
GLUCOSE SERPL-MCNC: 291 MG/DL (ref 65–140)
HCT VFR BLD AUTO: 19.7 % (ref 36.5–49.3)
HGB BLD-MCNC: 6.9 G/DL (ref 12–17)
IMM GRANULOCYTES # BLD AUTO: 0.11 THOUSAND/UL (ref 0–0.2)
IMM GRANULOCYTES NFR BLD AUTO: 1 % (ref 0–2)
LYMPHOCYTES # BLD AUTO: 1.86 THOUSANDS/ÂΜL (ref 0.6–4.47)
LYMPHOCYTES NFR BLD AUTO: 12 % (ref 14–44)
MCH RBC QN AUTO: 32.2 PG (ref 26.8–34.3)
MCHC RBC AUTO-ENTMCNC: 35 G/DL (ref 31.4–37.4)
MCV RBC AUTO: 92 FL (ref 82–98)
MONOCYTES # BLD AUTO: 1.33 THOUSAND/ÂΜL (ref 0.17–1.22)
MONOCYTES NFR BLD AUTO: 8 % (ref 4–12)
NEUTROPHILS # BLD AUTO: 12.57 THOUSANDS/ÂΜL (ref 1.85–7.62)
NEUTS SEG NFR BLD AUTO: 77 % (ref 43–75)
NRBC BLD AUTO-RTO: 0 /100 WBCS
PLATELET # BLD AUTO: 198 THOUSANDS/UL (ref 149–390)
PMV BLD AUTO: 12.7 FL (ref 8.9–12.7)
POTASSIUM SERPL-SCNC: 5.1 MMOL/L (ref 3.5–5.3)
PROT SERPL-MCNC: 5.5 G/DL (ref 6.4–8.4)
RBC # BLD AUTO: 2.14 MILLION/UL (ref 3.88–5.62)
SODIUM SERPL-SCNC: 135 MMOL/L (ref 135–147)
WBC # BLD AUTO: 16.16 THOUSAND/UL (ref 4.31–10.16)

## 2025-06-14 PROCEDURE — 82948 REAGENT STRIP/BLOOD GLUCOSE: CPT

## 2025-06-14 PROCEDURE — P9016 RBC LEUKOCYTES REDUCED: HCPCS

## 2025-06-14 PROCEDURE — 30233N1 TRANSFUSION OF NONAUTOLOGOUS RED BLOOD CELLS INTO PERIPHERAL VEIN, PERCUTANEOUS APPROACH: ICD-10-PCS | Performed by: INTERNAL MEDICINE

## 2025-06-14 PROCEDURE — 99232 SBSQ HOSP IP/OBS MODERATE 35: CPT | Performed by: INTERNAL MEDICINE

## 2025-06-14 PROCEDURE — 85025 COMPLETE CBC W/AUTO DIFF WBC: CPT | Performed by: INTERNAL MEDICINE

## 2025-06-14 PROCEDURE — 80053 COMPREHEN METABOLIC PANEL: CPT | Performed by: INTERNAL MEDICINE

## 2025-06-14 RX ORDER — PANTOPRAZOLE SODIUM 40 MG/1
40 TABLET, DELAYED RELEASE ORAL
Status: DISCONTINUED | OUTPATIENT
Start: 2025-06-14 | End: 2025-06-20 | Stop reason: HOSPADM

## 2025-06-14 RX ADMIN — SENNOSIDES 17.2 MG: 8.6 TABLET, FILM COATED ORAL at 21:40

## 2025-06-14 RX ADMIN — INSULIN LISPRO 2 UNITS: 100 INJECTION, SOLUTION INTRAVENOUS; SUBCUTANEOUS at 17:01

## 2025-06-14 RX ADMIN — HYDRALAZINE HYDROCHLORIDE 25 MG: 25 TABLET ORAL at 08:14

## 2025-06-14 RX ADMIN — CARVEDILOL 6.25 MG: 6.25 TABLET, FILM COATED ORAL at 08:14

## 2025-06-14 RX ADMIN — CEFTRIAXONE 1000 MG: 10 INJECTION, POWDER, FOR SOLUTION INTRAVENOUS at 17:06

## 2025-06-14 RX ADMIN — PANTOPRAZOLE SODIUM 40 MG: 40 TABLET, DELAYED RELEASE ORAL at 08:14

## 2025-06-14 RX ADMIN — SODIUM BICARBONATE 650 MG TABLET 1300 MG: at 11:58

## 2025-06-14 RX ADMIN — FOLIC ACID 1 MG: 1 TABLET ORAL at 08:14

## 2025-06-14 RX ADMIN — HYDRALAZINE HYDROCHLORIDE 25 MG: 25 TABLET ORAL at 17:02

## 2025-06-14 RX ADMIN — INSULIN LISPRO 2 UNITS: 100 INJECTION, SOLUTION INTRAVENOUS; SUBCUTANEOUS at 08:15

## 2025-06-14 RX ADMIN — AMLODIPINE BESYLATE 5 MG: 5 TABLET ORAL at 17:02

## 2025-06-14 RX ADMIN — SODIUM BICARBONATE 650 MG TABLET 1300 MG: at 17:01

## 2025-06-14 RX ADMIN — DOCUSATE SODIUM 100 MG: 100 CAPSULE, LIQUID FILLED ORAL at 17:02

## 2025-06-14 RX ADMIN — HYDRALAZINE HYDROCHLORIDE 25 MG: 25 TABLET ORAL at 01:15

## 2025-06-14 RX ADMIN — PANCRELIPASE 24000 UNITS: 120000; 24000; 76000 CAPSULE, DELAYED RELEASE PELLETS ORAL at 08:14

## 2025-06-14 RX ADMIN — PANCRELIPASE 24000 UNITS: 120000; 24000; 76000 CAPSULE, DELAYED RELEASE PELLETS ORAL at 17:02

## 2025-06-14 RX ADMIN — AMLODIPINE BESYLATE 5 MG: 5 TABLET ORAL at 08:14

## 2025-06-14 RX ADMIN — CARVEDILOL 6.25 MG: 6.25 TABLET, FILM COATED ORAL at 17:02

## 2025-06-14 RX ADMIN — PANCRELIPASE 24000 UNITS: 120000; 24000; 76000 CAPSULE, DELAYED RELEASE PELLETS ORAL at 11:58

## 2025-06-14 RX ADMIN — INSULIN LISPRO 3 UNITS: 100 INJECTION, SOLUTION INTRAVENOUS; SUBCUTANEOUS at 21:41

## 2025-06-14 RX ADMIN — INSULIN LISPRO 2 UNITS: 100 INJECTION, SOLUTION INTRAVENOUS; SUBCUTANEOUS at 11:58

## 2025-06-14 RX ADMIN — PANTOPRAZOLE SODIUM 40 MG: 40 TABLET, DELAYED RELEASE ORAL at 17:02

## 2025-06-14 RX ADMIN — DOCUSATE SODIUM 100 MG: 100 CAPSULE, LIQUID FILLED ORAL at 08:14

## 2025-06-14 RX ADMIN — THIAMINE HCL TAB 100 MG 100 MG: 100 TAB at 08:14

## 2025-06-14 RX ADMIN — SODIUM BICARBONATE 650 MG TABLET 1300 MG: at 08:14

## 2025-06-14 NOTE — ASSESSMENT & PLAN NOTE
Lab Results   Component Value Date    EGFR 24 06/14/2025    EGFR 28 06/13/2025    EGFR 28 06/12/2025    CREATININE 2.81 (H) 06/14/2025    CREATININE 2.42 (H) 06/13/2025    CREATININE 2.43 (H) 06/12/2025   Seen by nephrology on his previous admission, last seen on 6/6/2025  Baseline creatinine has been from 1.5-2.0 but on last admission his creatinine plateaued at 2.8-3.0 which may represent his new baseline due to his previous advanced CKD and recent ZENAIDA  He is high risk for dialysis in the near future  Creatinine stable at 2.81  Recheck labs tomorrow

## 2025-06-14 NOTE — ASSESSMENT & PLAN NOTE
Recent hemoglobin in the range of 7-9  Patient presented with abdominal pain, had coffee-ground emesis and melena on this admission  Underwent EGD which showed severe esophagitis most likely the cause of the coffee-ground emesis and melena  Hemoglobin today 6.9, order unit of blood  Recheck CBC tomorrow

## 2025-06-14 NOTE — PLAN OF CARE
Problem: PAIN - ADULT  Goal: Verbalizes/displays adequate comfort level or baseline comfort level  Description: Interventions:  - Encourage patient to monitor pain and request assistance  - Assess pain using appropriate pain scale  - Administer analgesics as ordered based on type and severity of pain and evaluate response  - Implement non-pharmacological measures as appropriate and evaluate response  - Consider cultural and social influences on pain and pain management  - Notify physician/advanced practitioner if interventions unsuccessful or patient reports new pain  - Educate patient/family on pain management process including their role and importance of  reporting pain   - Provide non-pharmacologic/complimentary pain relief interventions  Outcome: Progressing     Problem: INFECTION - ADULT  Goal: Absence or prevention of progression during hospitalization  Description: INTERVENTIONS:  - Assess and monitor for signs and symptoms of infection  - Monitor lab/diagnostic results  - Monitor all insertion sites, i.e. indwelling lines, tubes, and drains  - Monitor endotracheal if appropriate and nasal secretions for changes in amount and color  - Banks appropriate cooling/warming therapies per order  - Administer medications as ordered  - Instruct and encourage patient and family to use good hand hygiene technique  - Identify and instruct in appropriate isolation precautions for identified infection/condition  Outcome: Progressing  Goal: Absence of fever/infection during neutropenic period  Description: INTERVENTIONS:  - Monitor WBC  - Perform strict hand hygiene  - Limit to healthy visitors only  - No plants, dried, fresh or silk flowers with mcclure in patient room  Outcome: Progressing     Problem: SAFETY ADULT  Goal: Patient will remain free of falls  Description: INTERVENTIONS:  - Educate patient/family on patient safety including physical limitations  - Instruct patient to call for assistance with activity   -  Consider consulting OT/PT to assist with strengthening/mobility based on AM PAC & JH-HLM score  - Consult OT/PT to assist with strengthening/mobility   - Keep Call bell within reach  - Keep bed low and locked with side rails adjusted as appropriate  - Keep care items and personal belongings within reach  - Initiate and maintain comfort rounds  - Make Fall Risk Sign visible to staff  - Offer Toileting every 2 Hours, in advance of need  - Initiate/Maintain bed alarm  - Obtain necessary fall risk management equipment: yellow socks.  - Apply yellow socks and bracelet for high fall risk patients  - Consider moving patient to room near nurses station  Outcome: Progressing  Goal: Maintain or return to baseline ADL function  Description: INTERVENTIONS:  -  Assess patient's ability to carry out ADLs; assess patient's baseline for ADL function and identify physical deficits which impact ability to perform ADLs (bathing, care of mouth/teeth, toileting, grooming, dressing, etc.)  - Assess/evaluate cause of self-care deficits   - Assess range of motion  - Assess patient's mobility; develop plan if impaired  - Assess patient's need for assistive devices and provide as appropriate  - Encourage maximum independence but intervene and supervise when necessary  - Involve family in performance of ADLs  - Assess for home care needs following discharge   - Consider OT consult to assist with ADL evaluation and planning for discharge  - Provide patient education as appropriate  - Monitor functional capacity and physical performance, use of AM PAC & JH-HLM   - Monitor gait, balance and fatigue with ambulation    Outcome: Progressing  Goal: Maintains/Returns to pre admission functional level  Description: INTERVENTIONS:  - Perform AM-PAC 6 Click Basic Mobility/ Daily Activity assessment daily.  - Set and communicate daily mobility goal to care team and patient/family/caregiver.   - Collaborate with rehabilitation services on mobility goals  if consulted  - Perform Range of Motion 3 times a day.  - Reposition patient every 2 hours.  - Dangle patient 3 times a day  - Stand patient 3 times a day  - Ambulate patient 3 times a day  - Out of bed to chair 3 times a day   - Out of bed for meals 3 times a day  - Out of bed for toileting  - Record patient progress and toleration of activity level   Outcome: Progressing     Problem: DISCHARGE PLANNING  Goal: Discharge to home or other facility with appropriate resources  Description: INTERVENTIONS:  - Identify barriers to discharge w/patient and caregiver  - Arrange for needed discharge resources and transportation as appropriate  - Identify discharge learning needs (meds, wound care, etc.)  - Arrange for interpretive services to assist at discharge as needed  - Refer to Case Management Department for coordinating discharge planning if the patient needs post-hospital services based on physician/advanced practitioner order or complex needs related to functional status, cognitive ability, or social support system  Outcome: Progressing     Problem: Knowledge Deficit  Goal: Patient/family/caregiver demonstrates understanding of disease process, treatment plan, medications, and discharge instructions  Description: Complete learning assessment and assess knowledge base.  Interventions:  - Provide teaching at level of understanding  - Provide teaching via preferred learning methods  Outcome: Progressing     Problem: GASTROINTESTINAL - ADULT  Goal: Minimal or absence of nausea and/or vomiting  Description: INTERVENTIONS:  - Administer IV fluids if ordered to ensure adequate hydration  - Maintain NPO status until nausea and vomiting are resolved  - Nasogastric tube if ordered  - Administer ordered antiemetic medications as needed  - Provide nonpharmacologic comfort measures as appropriate  - Advance diet as tolerated, if ordered  - Consider nutrition services referral to assist patient with adequate nutrition and  appropriate food choices  Outcome: Progressing  Goal: Maintains or returns to baseline bowel function  Description: INTERVENTIONS:  - Assess bowel function  - Encourage oral fluids to ensure adequate hydration  - Administer IV fluids if ordered to ensure adequate hydration  - Administer ordered medications as needed  - Encourage mobilization and activity  - Consider nutritional services referral to assist patient with adequate nutrition and appropriate food choices  Outcome: Progressing  Goal: Maintains adequate nutritional intake  Description: INTERVENTIONS:  - Monitor percentage of each meal consumed  - Identify factors contributing to decreased intake, treat as appropriate  - Assist with meals as needed  - Monitor I&O, weight, and lab values if indicated  - Obtain nutrition services referral as needed  Outcome: Progressing     Problem: METABOLIC, FLUID AND ELECTROLYTES - ADULT  Goal: Electrolytes maintained within normal limits  Description: INTERVENTIONS:  - Monitor labs and assess patient for signs and symptoms of electrolyte imbalances  - Administer electrolyte replacement as ordered  - Monitor response to electrolyte replacements, including repeat lab results as appropriate  - Instruct patient on fluid and nutrition as appropriate  Outcome: Progressing  Goal: Fluid balance maintained  Description: INTERVENTIONS:  - Monitor labs   - Monitor I/O and WT  - Instruct patient on fluid and nutrition as appropriate  - Assess for signs & symptoms of volume excess or deficit  Outcome: Progressing  Goal: Glucose maintained within target range  Description: INTERVENTIONS:  - Monitor Blood Glucose as ordered  - Assess for signs and symptoms of hyperglycemia and hypoglycemia  - Administer ordered medications to maintain glucose within target range  - Assess nutritional intake and initiate nutrition service referral as needed  Outcome: Progressing     Problem: Nutrition/Hydration-ADULT  Goal: Nutrient/Hydration intake  appropriate for improving, restoring or maintaining nutritional needs  Description: Monitor and assess patient's nutrition/hydration status for malnutrition. Collaborate with interdisciplinary team and initiate plan and interventions as ordered.  Monitor patient's weight and dietary intake as ordered or per policy. Utilize nutrition screening tool and intervene as necessary. Determine patient's food preferences and provide high-protein, high-caloric foods as appropriate.     INTERVENTIONS:  - Monitor oral intake, urinary output, labs, and treatment plans  - Assess nutrition and hydration status and recommend course of action  - Evaluate amount of meals eaten  - Assist patient with eating if necessary   - Allow adequate time for meals  - Recommend/ encourage appropriate diets, oral nutritional supplements, and vitamin/mineral supplements  - Order, calculate, and assess calorie counts as needed  - Recommend, monitor, and adjust tube feedings and TPN/PPN based on assessed needs  - Assess need for intravenous fluids  - Provide specific nutrition/hydration education as appropriate  - Include patient/family/caregiver in decisions related to nutrition  Outcome: Progressing     Problem: Prexisting or High Potential for Compromised Skin Integrity  Goal: Skin integrity is maintained or improved  Description: INTERVENTIONS:  - Identify patients at risk for skin breakdown  - Assess and monitor skin integrity including under and around medical devices   - Assess and monitor nutrition and hydration status  - Monitor labs  - Assess for incontinence   - Turn and reposition patient  - Assist with mobility/ambulation  - Relieve pressure over keith prominences   - Avoid friction and shearing  - Provide appropriate hygiene as needed including keeping skin clean and dry  - Evaluate need for skin moisturizer/barrier cream  - Collaborate with interdisciplinary team  - Patient/family teaching  - Consider wound care consult    Assess:  -  Review Jose scale daily  - Clean and moisturize skin every day  - Inspect skin when repositioning, toileting, and assisting with ADLS  - Assess under medical devices such as masimo every shift  - Assess extremities for adequate circulation and sensation     Bed Management:  - Have minimal linens on bed & keep smooth, unwrinkled  - Change linens as needed when moist or perspiring  - Avoid sitting or lying in one position for more than 2 hours while in bed?Keep HOB at 30 degrees   - Toileting:  - Offer bedside commode  - Assess for incontinence every 2 hours  - Use incontinent care products after each incontinent episode such as hydroguard    Activity:  - Mobilize patient 4 times a day  - Encourage activity and walks on unit  - Encourage or provide ROM exercises   - Turn and reposition patient every 2 Hours  - Use appropriate equipment to lift or move patient in bed  - Instruct/ Assist with weight shifting every 10 min when out of bed in chair  - Consider limitation of chair time 2 hour intervals    Skin Care:  - Avoid use of baby powder, tape, friction and shearing, hot water or constrictive clothing  - Relieve pressure over bony prominences using foam  - Do not massage red bony areas    Next Steps:  - Teach patient strategies to minimize risks such as turning  - Consider consults to  interdisciplinary teams such as wounds care  Outcome: Progressing     Problem: Potential for Falls  Goal: Patient will remain free of falls  Description: INTERVENTIONS:  - Educate patient/family on patient safety including physical limitations  - Instruct patient to call for assistance with activity   - Consider consulting OT/PT to assist with strengthening/mobility based on AM PAC & JH-HLM score  - Consult OT/PT to assist with strengthening/mobility   - Keep Call bell within reach  - Keep bed low and locked with side rails adjusted as appropriate  - Keep care items and personal belongings within reach  - Initiate and maintain comfort  rounds  - Make Fall Risk Sign visible to staff  - Offer Toileting every 2 Hours, in advance of need  - Initiate/Maintain bed alarm  - Obtain necessary fall risk management equipment: yellow socks.  - Apply yellow socks and bracelet for high fall risk patients  - Consider moving patient to room near nurses station  Outcome: Progressing

## 2025-06-14 NOTE — ASSESSMENT & PLAN NOTE
Lab Results   Component Value Date    HGBA1C 9.6 (H) 05/26/2025       Recent Labs     06/13/25  2136 06/14/25  0736 06/14/25  1124 06/14/25  1617   POCGLU 300* 267* 228* 225*       Blood Sugar Average: Last 72 hrs:  (P) [id number]  [facility]NK on his previous admission due to noncompliance and alcohol abuse  Home regimen is 70/30, 8 units twice daily, which was held due to coffee-ground emesis  Restart 70/30 at 8 units twice daily today

## 2025-06-14 NOTE — ASSESSMENT & PLAN NOTE
Night of admission 6/12/2025 developed coffee-ground emesis and melena  6/13/2025 underwent EGD which showed esophagitis, no esophageal or gastric varices.  Rest of the exam normal  Per GI suspect coffee-ground emesis in the setting of severe esophagitis  Hemoglobin today 6.9, order unit of blood  No signs of overt GI bleed  Recheck CBC tomorrow

## 2025-06-14 NOTE — PLAN OF CARE
Problem: PAIN - ADULT  Goal: Verbalizes/displays adequate comfort level or baseline comfort level  Description: Interventions:  - Encourage patient to monitor pain and request assistance  - Assess pain using appropriate pain scale  - Administer analgesics as ordered based on type and severity of pain and evaluate response  - Implement non-pharmacological measures as appropriate and evaluate response  - Consider cultural and social influences on pain and pain management  - Notify physician/advanced practitioner if interventions unsuccessful or patient reports new pain  - Educate patient/family on pain management process including their role and importance of  reporting pain   - Provide non-pharmacologic/complimentary pain relief interventions  Outcome: Progressing     Problem: INFECTION - ADULT  Goal: Absence or prevention of progression during hospitalization  Description: INTERVENTIONS:  - Assess and monitor for signs and symptoms of infection  - Monitor lab/diagnostic results  - Monitor all insertion sites, i.e. indwelling lines, tubes, and drains  - Monitor endotracheal if appropriate and nasal secretions for changes in amount and color  - [place] appropriate cooling/warming therapies per order  - Administer medications as ordered  - Instruct and encourage patient and family to use good hand hygiene technique  - Identify and instruct in appropriate isolation precautions for identified infection/condition  Outcome: Progressing  Goal: Absence of fever/infection during neutropenic period  Description: INTERVENTIONS:  - Monitor WBC  - Perform strict hand hygiene  - Limit to healthy visitors only  - No plants, dried, fresh or silk flowers with [place] in patient room  Outcome: Progressing     Problem: SAFETY ADULT  Goal: Patient will remain free of falls  Description: INTERVENTIONS:  - Educate patient/family on patient safety including physical limitations  - Instruct patient to call for assistance with activity   -  Consider consulting OT/PT to assist with strengthening/mobility based on AM PAC & JH-HLM score  - Consult OT/PT to assist with strengthening/mobility   - Keep Call bell within reach  - Keep bed low and locked with side rails adjusted as appropriate  - Keep care items and personal belongings within reach  - Initiate and maintain comfort rounds  - Make Fall Risk Sign visible to staff  - Offer Toileting every 2 Hours, in advance of need  - Initiate/Maintain bed alarm  - Obtain necessary fall risk management equipment: yellow socks.  - Apply yellow socks and bracelet for high fall risk patients  - Consider moving patient to room near nurses station  Outcome: Progressing  Goal: Maintain or return to baseline ADL function  Description: INTERVENTIONS:  -  Assess patient's ability to carry out ADLs; assess patient's baseline for ADL function and identify physical deficits which impact ability to perform ADLs (bathing, care of mouth/teeth, toileting, grooming, dressing, etc.)  - Assess/evaluate cause of self-care deficits   - Assess range of motion  - Assess patient's mobility; develop plan if impaired  - Assess patient's need for assistive devices and provide as appropriate  - Encourage maximum independence but intervene and supervise when necessary  - Involve family in performance of ADLs  - Assess for home care needs following discharge   - Consider OT consult to assist with ADL evaluation and planning for discharge  - Provide patient education as appropriate  - Monitor functional capacity and physical performance, use of AM PAC & JH-HLM   - Monitor gait, balance and fatigue with ambulation    Outcome: Progressing  Goal: Maintains/Returns to pre admission functional level  Description: INTERVENTIONS:  - Perform AM-PAC 6 Click Basic Mobility/ Daily Activity assessment daily.  - Set and communicate daily mobility goal to care team and patient/family/caregiver.   - Collaborate with rehabilitation services on mobility goals  if consulted  - Perform Range of Motion 3 times a day.  - Reposition patient every 2 hours.  - Dangle patient 3 times a day  - Stand patient 3 times a day  - Ambulate patient 3 times a day  - Out of bed to chair 3 times a day   - Out of bed for meals 3 times a day  - Out of bed for toileting  - Record patient progress and toleration of activity level   Outcome: Progressing     Problem: DISCHARGE PLANNING  Goal: Discharge to home or other facility with appropriate resources  Description: INTERVENTIONS:  - Identify barriers to discharge w/patient and caregiver  - Arrange for needed discharge resources and transportation as appropriate  - Identify discharge learning needs (meds, wound care, etc.)  - Arrange for interpretive services to assist at discharge as needed  - Refer to Case Management Department for coordinating discharge planning if the patient needs post-hospital services based on physician/advanced practitioner order or complex needs related to functional status, cognitive ability, or social support system  Outcome: Progressing     Problem: Knowledge Deficit  Goal: Patient/family/caregiver demonstrates understanding of disease process, treatment plan, medications, and discharge instructions  Description: Complete learning assessment and assess knowledge base.  Interventions:  - Provide teaching at level of understanding  - Provide teaching via preferred learning methods  Outcome: Progressing     Problem: GASTROINTESTINAL - ADULT  Goal: Minimal or absence of nausea and/or vomiting  Description: INTERVENTIONS:  - Administer IV fluids if ordered to ensure adequate hydration  - Maintain NPO status until nausea and vomiting are resolved  - Nasogastric tube if ordered  - Administer ordered antiemetic medications as needed  - Provide nonpharmacologic comfort measures as appropriate  - Advance diet as tolerated, if ordered  - Consider nutrition services referral to assist patient with adequate nutrition and  appropriate food choices  Outcome: Progressing  Goal: Maintains or returns to baseline bowel function  Description: INTERVENTIONS:  - Assess bowel function  - Encourage oral fluids to ensure adequate hydration  - Administer IV fluids if ordered to ensure adequate hydration  - Administer ordered medications as needed  - Encourage mobilization and activity  - Consider nutritional services referral to assist patient with adequate nutrition and appropriate food choices  Outcome: Progressing  Goal: Maintains adequate nutritional intake  Description: INTERVENTIONS:  - Monitor percentage of each meal consumed  - Identify factors contributing to decreased intake, treat as appropriate  - Assist with meals as needed  - Monitor I&O, weight, and lab values if indicated  - Obtain nutrition services referral as needed  Outcome: Progressing     Problem: METABOLIC, FLUID AND ELECTROLYTES - ADULT  Goal: Electrolytes maintained within normal limits  Description: INTERVENTIONS:  - Monitor labs and assess patient for signs and symptoms of electrolyte imbalances  - Administer electrolyte replacement as ordered  - Monitor response to electrolyte replacements, including repeat lab results as appropriate  - Instruct patient on fluid and nutrition as appropriate  Outcome: Progressing  Goal: Fluid balance maintained  Description: INTERVENTIONS:  - Monitor labs   - Monitor I/O and WT  - Instruct patient on fluid and nutrition as appropriate  - Assess for signs & symptoms of volume excess or deficit  Outcome: Progressing  Goal: Glucose maintained within target range  Description: INTERVENTIONS:  - Monitor Blood Glucose as ordered  - Assess for signs and symptoms of hyperglycemia and hypoglycemia  - Administer ordered medications to maintain glucose within target range  - Assess nutritional intake and initiate nutrition service referral as needed  Outcome: Progressing     Problem: Nutrition/Hydration-ADULT  Goal: Nutrient/Hydration intake  appropriate for improving, restoring or maintaining nutritional needs  Description: Monitor and assess patient's nutrition/hydration status for malnutrition. Collaborate with interdisciplinary team and initiate plan and interventions as ordered.  Monitor patient's weight and dietary intake as ordered or per policy. Utilize nutrition screening tool and intervene as necessary. Determine patient's food preferences and provide high-protein, high-caloric foods as appropriate.     INTERVENTIONS:  - Monitor oral intake, urinary output, labs, and treatment plans  - Assess nutrition and hydration status and recommend course of action  - Evaluate amount of meals eaten  - Assist patient with eating if necessary   - Allow adequate time for meals  - Recommend/ encourage appropriate diets, oral nutritional supplements, and vitamin/mineral supplements  - Order, calculate, and assess calorie counts as needed  - Recommend, monitor, and adjust tube feedings and TPN/PPN based on assessed needs  - Assess need for intravenous fluids  - Provide specific nutrition/hydration education as appropriate  - Include patient/family/caregiver in decisions related to nutrition  Outcome: Progressing     Problem: Prexisting or High Potential for Compromised Skin Integrity  Goal: Skin integrity is maintained or improved  Description: INTERVENTIONS:  - Identify patients at risk for skin breakdown  - Assess and monitor skin integrity including under and around medical devices   - Assess and monitor nutrition and hydration status  - Monitor labs  - Assess for incontinence   - Turn and reposition patient  - Assist with mobility/ambulation  - Relieve pressure over keith prominences   - Avoid friction and shearing  - Provide appropriate hygiene as needed including keeping skin clean and dry  - Evaluate need for skin moisturizer/barrier cream  - Collaborate with interdisciplinary team  - Patient/family teaching  - Consider wound care consult    Assess:  -  Review Jose scale daily  - Clean and moisturize skin every   - Inspect skin when repositioning, toileting, and assisting with ADLS  - Assess under medical devices such as  every   - Assess extremities for adequate circulation and sensation     Bed Management:  - Have minimal linens on bed & keep smooth, unwrinkled  - Change linens as needed when moist or perspiring  - Avoid sitting or lying in one position for more than  hours while in bed?Keep HOB at degrees   - Toileting:  - Offer bedside commode  - Assess for incontinence every   - Use incontinent care products after each incontinent episode such as     Activity:  - Mobilize patient  times a day  - Encourage activity and walks on unit  - Encourage or provide ROM exercises   - Turn and reposition patient every  Hours  - Use appropriate equipment to lift or move patient in bed  - Instruct/ Assist with weight shifting every  when out of bed in chair  - Consider limitation of chair time  hour intervals    Skin Care:  - Avoid use of baby powder, tape, friction and shearing, hot water or constrictive clothing  - Relieve pressure over bony prominences using   - Do not massage red bony areas    Next Steps:  - Teach patient strategies to minimize risks such as   - Consider consults to  interdisciplinary teams such as  Outcome: Progressing     Problem: Potential for Falls  Goal: Patient will remain free of falls  Description: INTERVENTIONS:  - Educate patient/family on patient safety including physical limitations  - Instruct patient to call for assistance with activity   - Consider consulting OT/PT to assist with strengthening/mobility based on AM PAC & JH-HLM score  - Consult OT/PT to assist with strengthening/mobility   - Keep Call bell within reach  - Keep bed low and locked with side rails adjusted as appropriate  - Keep care items and personal belongings within reach  - Initiate and maintain comfort rounds  - Make Fall Risk Sign visible to staff  - Offer Toileting  every 2 Hours, in advance of need  - Initiate/Maintain bed alarm  - Obtain necessary fall risk management equipment: yellow socks.  - Apply yellow socks and bracelet for high fall risk patients  - Consider moving patient to room near nurses station  Outcome: Progressing

## 2025-06-14 NOTE — PROGRESS NOTES
Progress Note - Hospitalist   Name: Wes Araujo 55 y.o. male I MRN: [id number]  Unit/Bed#: [place] [address] -01 I Date of Admission: 6/12/2025   Date of Service: 6/14/2025 I Hospital Day: 2    Assessment & Plan  Alcoholic hepatitis with ascites  Patient is a 55-year-old male with a past medical history of alcohol abuse, alcoholic liver cirrhosis, anemia, hypertension, type 2 diabetes, history of atrial fibrillation, chronic pancreatitis who presented to the hospital with abdominal pain and distention.  Overnight on admission developed coffee-ground emesis and melena  Patient was admitted from 5/23/2025 to 6/6/2025 secondary to toxic metabolic encephalopathy secondary to hyperglycemia, HHNK, kidney injury and acute liver failure.  Received insulin, IV fluids, seen by gastroenterology, DF did not meet criteria for steroids, infection was ruled out, initial plan was for patient to be discharged to inpatient alcoholic rehab but patient refused and he was discharged home  6/12/2025 CT showed large volume ascites, increased since 6/3/2025   6/13/2025 underwent paracentesis 1670 cc clear fluid removed, no evidence of SBP, fluid cultures are pending  6/13/2025 underwent EGD which showed esophagitis.  No esophageal or gastric varices.  Ceftriaxone for 5 days for SBP prophylaxis in the setting of GI bleed  Followed by GI  Coffee ground emesis  Night of admission 6/12/2025 developed coffee-ground emesis and melena  6/13/2025 underwent EGD which showed esophagitis, no esophageal or gastric varices.  Rest of the exam normal  Per GI suspect coffee-ground emesis in the setting of severe esophagitis  Hemoglobin today 6.9, order unit of blood  No signs of overt GI bleed  Recheck CBC tomorrow  Melena  Secondary to severe esophagitis  See plan above  Anemia  Recent hemoglobin in the range of 7-9  Patient presented with abdominal pain, had coffee-ground emesis and melena on this admission  Underwent EGD which showed severe esophagitis  most likely the cause of the coffee-ground emesis and melena  Hemoglobin today 6.9, order unit of blood  Recheck CBC tomorrow  CKD (chronic kidney disease) stage 4, GFR 15-29 ml/min ([facility])  Lab Results   Component Value Date    EGFR 24 06/14/2025    EGFR 28 06/13/2025    EGFR 28 06/12/2025    CREATININE 2.81 (H) 06/14/2025    CREATININE 2.42 (H) 06/13/2025    CREATININE 2.43 (H) 06/12/2025   Seen by nephrology on his previous admission, last seen on 6/6/2025  Baseline creatinine has been from 1.5-2.0 but on last admission his creatinine plateaued at 2.8-3.0 which may represent his new baseline due to his previous advanced CKD and recent ZENAIDA  He is high risk for dialysis in the near future  Creatinine stable at 2.81  Recheck labs tomorrow  Alcohol abuse  Pt with history of alcohol abuse with withdrawal. He states he typically drinks multiple beer a day, but states he has not been drinking since he was discharged from the hospital last week  ETOH <10  Monitor on CIWA for now  Vitamin supplementation  Encourage continued cessation  Type 2 diabetes mellitus with hyperglycemia, with long-term current use of insulin ([facility])  Lab Results   Component Value Date    HGBA1C 9.6 (H) 05/26/2025       Recent Labs     06/13/25  2136 06/14/25  0736 06/14/25  1124 06/14/25  1617   POCGLU 300* 267* 228* 225*       Blood Sugar Average: Last 72 hrs:  (P) [id number]  HHNK on his previous admission due to noncompliance and alcohol abuse  Home regimen is 70/30, 8 units twice daily, which was held due to coffee-ground emesis  Restart 70/30 at 8 units twice daily today  Generalized weakness  Pt reports that since discharge he has been having difficulty with ambulation  Typically he ambulates without assistive devices, but he reports he has been struggling  PT/OT eval  Primary hypertension  Continue amlodipine 5 mg twice daily, hydralazine 25 mg 3 times daily, and Coreg 6.25 twice daily with holding parameters  Chronic pancreatitis  (HCC)  Patient with a history of chronic pancreatitis secondary to alcohol   Chronic pancreatitis stable on CT imaging on admission    History of atrial fibrillation  History of atrial fibrillation on metoprolol  Not on anticoagulation at baseline  Constipation  Pt reports no bowel movement for 6 days  Bowel regimen initiated    VTE Pharmacologic Prophylaxis: VTE Score: 1 contraindicated due to coffee-ground emesis    Mobility:   Basic Mobility Inpatient Raw Score: 18  JH-HLM Goal: 6: Walk 10 steps or more  JH-HLM Achieved: 6: Walk 10 steps or more  JH-HLM Goal NOT achieved. Continue with multidisciplinary rounding and encourage appropriate mobility to improve upon JH-HLM goals.    Patient Centered Rounds: I performed bedside rounds with nursing staff today.   Discussions with Specialists or Other Care Team Provider: Nursing    Education and Discussions with Family / Patient: Attempted to update  (father and brother) via phone. Unable to contact.    Current Length of Stay: 2 day(s)  Current Patient Status: Inpatient   Certification Statement: The patient will continue to require additional inpatient hospital stay due to monitor kidney function, monitor for GI bleed, received a unit of blood, recheck labs tomorrow  Discharge Plan: Anticipate discharge in 48-72 hrs to discharge location to be determined pending rehab evaluations.    Code Status: Level 1 - Full Code    Subjective   Patient was seen and evaluated bedside, tired but denies chest pain palpitation shortness of breath    Objective :  Temp:  [97.9 °F (36.6 °C)-98.8 °F (37.1 °C)] 97.9 °F (36.6 °C)  HR:  [73-81] 81  BP: (114-157)/(54-77) 145/72  Resp:  [16-20] 17  SpO2:  [95 %-98 %] 96 %  O2 Device: None (Room air)    Body mass index is 21 kg/m².     Input and Output Summary (last 24 hours):     Intake/Output Summary (Last 24 hours) at 6/14/2025 1704  Last data filed at 6/14/2025 1457  Gross per 24 hour   Intake 1165.83 ml   Output 2 ml   Net  1163.83 ml       Physical Exam  Constitutional:       General: He is not in acute distress.     Appearance: He is ill-appearing.   HENT:      Head: Atraumatic.     Cardiovascular:      Rate and Rhythm: Normal rate and regular rhythm.      Heart sounds: No murmur heard.  Pulmonary:      Effort: Pulmonary effort is normal. No respiratory distress.      Breath sounds: Normal breath sounds. No wheezing.   Abdominal:      General: There is distension.      Palpations: Abdomen is soft.      Tenderness: There is no abdominal tenderness.     Musculoskeletal:         General: No swelling.     Skin:     Coloration: Skin is jaundiced.     Neurological:      General: No focal deficit present.      Mental Status: He is alert.     Psychiatric:         Mood and Affect: Mood normal.           Lines/Drains:              Lab Results: I have reviewed the following results:   Results from last 7 days   Lab Units 06/14/25  0447   WBC Thousand/uL 16.16*   HEMOGLOBIN g/dL 6.9*   HEMATOCRIT % 19.7*   PLATELETS Thousands/uL 198   SEGS PCT % 77*   LYMPHO PCT % 12*   MONO PCT % 8   EOS PCT % 2     Results from last 7 days   Lab Units 06/14/25  0447   SODIUM mmol/L 135   POTASSIUM mmol/L 5.1   CHLORIDE mmol/L 108   CO2 mmol/L 19*   BUN mg/dL 37*   CREATININE mg/dL 2.81*   ANION GAP mmol/L 8   CALCIUM mg/dL 9.0   ALBUMIN g/dL 3.5   TOTAL BILIRUBIN mg/dL 12.79*   ALK PHOS U/L 292*   ALT U/L 27   AST U/L 36   GLUCOSE RANDOM mg/dL 287*     Results from last 7 days   Lab Units 06/13/25  0553   INR  1.28*     Results from last 7 days   Lab Units 06/14/25  1617 06/14/25  1124 06/14/25  0736 06/13/25  2136 06/13/25  1609 06/13/25  1106 06/13/25  0838 06/13/25  0724 06/13/25  0242   POC GLUCOSE mg/dl 225* 228* 267* 300* 151* 263* 292* 251* 280*               Recent Cultures (last 7 days):   Results from last 7 days   Lab Units 06/13/25  1005   GRAM STAIN RESULT  Rare Polys  No bacteria seen   BODY FLUID CULTURE, STERILE  No growth       Imaging  Results Review: I reviewed radiology reports from this admission including: procedure reports.  Other Study Results Review: No additional pertinent studies reviewed.    Last 24 Hours Medication List:     Current Facility-Administered Medications:     amLODIPine (NORVASC) tablet 5 mg, BID    bisacodyl (DULCOLAX) rectal suppository 10 mg, Daily PRN    carvedilol (COREG) tablet 6.25 mg, BID With Meals    cefTRIAXone (ROCEPHIN) 1,000 mg in dextrose 5 % 50 mL IVPB, Q24H, Last Rate: 1,000 mg (06/13/25 1746)    docusate sodium (COLACE) capsule 100 mg, BID    folic acid (FOLVITE) tablet 1 mg, Daily    hydrALAZINE (APRESOLINE) tablet 25 mg, Q8H JAISON    insulin aspart protamine-insulin aspart (NovoLOG 70/30) 100 units/mL subcutaneous injection 8 Units, BID AC    insulin lispro (HumALOG/ADMELOG) 100 units/mL subcutaneous injection 1-5 Units, TID AC **AND** Fingerstick Glucose (POCT), TID AC    insulin lispro (HumALOG/ADMELOG) 100 units/mL subcutaneous injection 1-5 Units, HS    [Held by provider] multivitamin-minerals (CENTRUM) tablet 1 tablet, Daily    pancrelipase (Lip-Prot-Amyl) (CREON) delayed release capsule 24,000 Units, TID With Meals    pantoprazole (PROTONIX) EC tablet 40 mg, BID AC    polyethylene glycol (MIRALAX) packet 17 g, Daily PRN    senna (SENOKOT) tablet 17.2 mg, HS    sodium bicarbonate tablet 1,300 mg, TID after meals    thiamine tablet 100 mg, Daily    trimethobenzamide (TIGAN) IM injection 200 mg, Q6H PRN    Administrative Statements   Today, Patient Was Seen By: Naa Quiles MD      **Please Note: This note may have been constructed using a voice recognition system.**

## 2025-06-14 NOTE — ASSESSMENT & PLAN NOTE
Patient is a 55-year-old male with a past medical history of alcohol abuse, alcoholic liver cirrhosis, anemia, hypertension, type 2 diabetes, history of atrial fibrillation, chronic pancreatitis who presented to the hospital with abdominal pain and distention.  Overnight on admission developed coffee-ground emesis and melena  Patient was admitted from 5/23/2025 to 6/6/2025 secondary to toxic metabolic encephalopathy secondary to hyperglycemia, HHNK, kidney injury and acute liver failure.  Received insulin, IV fluids, seen by gastroenterology, DF did not meet criteria for steroids, infection was ruled out, initial plan was for patient to be discharged to inpatient alcoholic rehab but patient refused and he was discharged home  6/12/2025 CT showed large volume ascites, increased since 6/3/2025   6/13/2025 underwent paracentesis 1670 cc clear fluid removed, no evidence of SBP, fluid cultures are pending  6/13/2025 underwent EGD which showed esophagitis.  No esophageal or gastric varices.  Ceftriaxone for 5 days for SBP prophylaxis in the setting of GI bleed  Followed by GI

## 2025-06-14 NOTE — PROGRESS NOTES
The pantoprazole has been converted to Oral per [facility] IV-to-PO Auto-Conversion Protocol for Adults as approved by the Pharmacy and Therapeutics Committee. The patient met all eligible criteria:  1) Age = 18 years old   2) Received at least one dose of the IV form   3) Receiving at least one other scheduled oral/enteral medication   4) Tolerating an oral/enteral diet   and did not have any exclusions:   1) Critical care patient   2) Active GI bleed (IF assessing H2RAs or PPIs)   3) Continuous tube feeding (IF assessing cipro, doxycycline, levofloxacin, minocycline, rifampin, or voriconazole)   4) Receiving PO vancomycin (IF assessing metronidazole)   5) Persistent nausea and/or vomiting   6) Ileus or gastrointestinal obstruction   7) Cristino/nasogastric tube set for continuous suction   8) Specific order not to automatically convert to PO (in the order's comments or if discussed in the most recent Infectious Disease or primary team's progress notes).     Cesia Alamo, PharmD, BCCCP  Critical Care and Internal Medicine Clinical Pharmacist  [phone] or via Secure Chat

## 2025-06-14 NOTE — ASSESSMENT & PLAN NOTE
Continue amlodipine 5 mg twice daily, hydralazine 25 mg 3 times daily, and Coreg 6.25 twice daily with holding parameters

## 2025-06-15 LAB
ABO GROUP BLD BPU: NORMAL
ALBUMIN SERPL BCG-MCNC: 3.2 G/DL (ref 3.5–5)
ALP SERPL-CCNC: 295 U/L (ref 34–104)
ALT SERPL W P-5'-P-CCNC: 26 U/L (ref 7–52)
ANION GAP SERPL CALCULATED.3IONS-SCNC: 8 MMOL/L (ref 4–13)
AST SERPL W P-5'-P-CCNC: 35 U/L (ref 13–39)
BILIRUB SERPL-MCNC: 12.88 MG/DL (ref 0.2–1)
BPU ID: NORMAL
BUN SERPL-MCNC: 38 MG/DL (ref 5–25)
CALCIUM ALBUM COR SERPL-MCNC: 9.3 MG/DL (ref 8.3–10.1)
CALCIUM SERPL-MCNC: 8.7 MG/DL (ref 8.4–10.2)
CHLORIDE SERPL-SCNC: 107 MMOL/L (ref 96–108)
CO2 SERPL-SCNC: 20 MMOL/L (ref 21–32)
CREAT SERPL-MCNC: 3.05 MG/DL (ref 0.6–1.3)
CROSSMATCH: NORMAL
ERYTHROCYTE [DISTWIDTH] IN BLOOD BY AUTOMATED COUNT: 16.4 % (ref 11.6–15.1)
GFR SERPL CREATININE-BSD FRML MDRD: 21 ML/MIN/1.73SQ M
GLUCOSE SERPL-MCNC: 120 MG/DL (ref 65–140)
GLUCOSE SERPL-MCNC: 146 MG/DL (ref 65–140)
GLUCOSE SERPL-MCNC: 198 MG/DL (ref 65–140)
GLUCOSE SERPL-MCNC: 207 MG/DL (ref 65–140)
HCT VFR BLD AUTO: 24.2 % (ref 36.5–49.3)
HGB BLD-MCNC: 8.5 G/DL (ref 12–17)
MCH RBC QN AUTO: 31.6 PG (ref 26.8–34.3)
MCHC RBC AUTO-ENTMCNC: 35.1 G/DL (ref 31.4–37.4)
MCV RBC AUTO: 90 FL (ref 82–98)
PLATELET # BLD AUTO: 178 THOUSANDS/UL (ref 149–390)
PMV BLD AUTO: 12.6 FL (ref 8.9–12.7)
POTASSIUM SERPL-SCNC: 4.8 MMOL/L (ref 3.5–5.3)
PROT SERPL-MCNC: 5.2 G/DL (ref 6.4–8.4)
RBC # BLD AUTO: 2.69 MILLION/UL (ref 3.88–5.62)
SODIUM SERPL-SCNC: 135 MMOL/L (ref 135–147)
UNIT DISPENSE STATUS: NORMAL
UNIT PRODUCT CODE: NORMAL
UNIT PRODUCT VOLUME: 350 ML
UNIT RH: NORMAL
WBC # BLD AUTO: 12.4 THOUSAND/UL (ref 4.31–10.16)

## 2025-06-15 PROCEDURE — 85027 COMPLETE CBC AUTOMATED: CPT | Performed by: INTERNAL MEDICINE

## 2025-06-15 PROCEDURE — 99232 SBSQ HOSP IP/OBS MODERATE 35: CPT | Performed by: INTERNAL MEDICINE

## 2025-06-15 PROCEDURE — 82948 REAGENT STRIP/BLOOD GLUCOSE: CPT

## 2025-06-15 PROCEDURE — 80053 COMPREHEN METABOLIC PANEL: CPT | Performed by: INTERNAL MEDICINE

## 2025-06-15 RX ORDER — OXYCODONE HYDROCHLORIDE 5 MG/1
5 TABLET ORAL EVERY 4 HOURS PRN
Refills: 0 | Status: DISCONTINUED | OUTPATIENT
Start: 2025-06-15 | End: 2025-06-15

## 2025-06-15 RX ORDER — ACETAMINOPHEN 325 MG/1
325 TABLET ORAL EVERY 8 HOURS PRN
Status: DISCONTINUED | OUTPATIENT
Start: 2025-06-15 | End: 2025-06-20 | Stop reason: HOSPADM

## 2025-06-15 RX ORDER — ALBUMIN (HUMAN) 12.5 G/50ML
25 SOLUTION INTRAVENOUS EVERY 8 HOURS
Status: COMPLETED | OUTPATIENT
Start: 2025-06-15 | End: 2025-06-16

## 2025-06-15 RX ADMIN — HYDRALAZINE HYDROCHLORIDE 25 MG: 25 TABLET ORAL at 08:35

## 2025-06-15 RX ADMIN — ALBUMIN (HUMAN) 25 G: 0.25 INJECTION, SOLUTION INTRAVENOUS at 23:34

## 2025-06-15 RX ADMIN — PANCRELIPASE 24000 UNITS: 120000; 24000; 76000 CAPSULE, DELAYED RELEASE PELLETS ORAL at 08:35

## 2025-06-15 RX ADMIN — FOLIC ACID 1 MG: 1 TABLET ORAL at 08:35

## 2025-06-15 RX ADMIN — Medication 2.5 MG: at 02:37

## 2025-06-15 RX ADMIN — CARVEDILOL 6.25 MG: 6.25 TABLET, FILM COATED ORAL at 08:35

## 2025-06-15 RX ADMIN — SODIUM BICARBONATE 650 MG TABLET 1300 MG: at 17:21

## 2025-06-15 RX ADMIN — ACETAMINOPHEN 325 MG: 325 TABLET ORAL at 12:50

## 2025-06-15 RX ADMIN — PANTOPRAZOLE SODIUM 40 MG: 40 TABLET, DELAYED RELEASE ORAL at 05:55

## 2025-06-15 RX ADMIN — INSULIN ASPART 8 UNITS: 100 INJECTION, SUSPENSION SUBCUTANEOUS at 08:35

## 2025-06-15 RX ADMIN — DOCUSATE SODIUM 100 MG: 100 CAPSULE, LIQUID FILLED ORAL at 17:21

## 2025-06-15 RX ADMIN — AMLODIPINE BESYLATE 5 MG: 5 TABLET ORAL at 08:35

## 2025-06-15 RX ADMIN — THIAMINE HCL TAB 100 MG 100 MG: 100 TAB at 08:35

## 2025-06-15 RX ADMIN — SODIUM BICARBONATE 650 MG TABLET 1300 MG: at 12:50

## 2025-06-15 RX ADMIN — ALBUMIN (HUMAN) 25 G: 0.25 INJECTION, SOLUTION INTRAVENOUS at 15:36

## 2025-06-15 RX ADMIN — AMLODIPINE BESYLATE 5 MG: 5 TABLET ORAL at 17:21

## 2025-06-15 RX ADMIN — CEFTRIAXONE 1000 MG: 10 INJECTION, POWDER, FOR SOLUTION INTRAVENOUS at 16:00

## 2025-06-15 RX ADMIN — HYDRALAZINE HYDROCHLORIDE 25 MG: 25 TABLET ORAL at 17:21

## 2025-06-15 RX ADMIN — SODIUM BICARBONATE 650 MG TABLET 1300 MG: at 08:35

## 2025-06-15 RX ADMIN — SENNOSIDES 17.2 MG: 8.6 TABLET, FILM COATED ORAL at 21:27

## 2025-06-15 RX ADMIN — Medication 2.5 MG: at 21:27

## 2025-06-15 RX ADMIN — DOCUSATE SODIUM 100 MG: 100 CAPSULE, LIQUID FILLED ORAL at 08:35

## 2025-06-15 RX ADMIN — PANCRELIPASE 24000 UNITS: 120000; 24000; 76000 CAPSULE, DELAYED RELEASE PELLETS ORAL at 12:50

## 2025-06-15 RX ADMIN — HYDRALAZINE HYDROCHLORIDE 25 MG: 25 TABLET ORAL at 01:58

## 2025-06-15 RX ADMIN — INSULIN ASPART 8 UNITS: 100 INJECTION, SUSPENSION SUBCUTANEOUS at 17:21

## 2025-06-15 RX ADMIN — INSULIN LISPRO 1 UNITS: 100 INJECTION, SOLUTION INTRAVENOUS; SUBCUTANEOUS at 08:35

## 2025-06-15 RX ADMIN — PANTOPRAZOLE SODIUM 40 MG: 40 TABLET, DELAYED RELEASE ORAL at 17:21

## 2025-06-15 RX ADMIN — CARVEDILOL 6.25 MG: 6.25 TABLET, FILM COATED ORAL at 17:21

## 2025-06-15 RX ADMIN — PANCRELIPASE 24000 UNITS: 120000; 24000; 76000 CAPSULE, DELAYED RELEASE PELLETS ORAL at 17:21

## 2025-06-15 NOTE — ASSESSMENT & PLAN NOTE
Wes Araujo is a 55-year-old male with a past medical history of hypertension, CKD, diabetes mellitus type 2, chronic pancreatitis secondary to chronic alcohol use, and recent admission for alc hep with ZENAIDA on CKD who presented to [facility] on 6/12 for concern of recurrent abdominal distension with associated pain.   Labs on presentation significant for continued improvement is AST/ALT/ALP since last admission, however, bilirubin continues to increase.  INR 1.18 on presentation (peaked last admission at 5.53 with dramatic improvement after Vitamin K). Kidney function appears to have improved from discharge and remains stable at this time.     Patient previously underwent serologic liver workup negative for alternative cause of liver injury but elevations thought to be 2/2 alc hep in setting of some degree of underlying fibrosis.  Patient was treated with conservative management and no steroids due to low Madrey's score.  Patient was recommended to follow-up with gastroenterology but now her presenting with recurrent abdominal distention/pain along with coffee-ground emesis and melena.  Patient now status post EGD which revealed grade D esophagitis.    Pamela's Discriminant Function - Control Prothrombin 13: 27.51 at 6/14/2025  4:47 AM  Calculated from:  Total Bilirubin: 12.79 mg/dL at 6/14/2025  4:47 AM  Prothrombin Time: 16.2 seconds at 6/13/2025  5:53 AM  MDF = (4.6 * (PT - Control PT)) + Bilirubin     # Elevated Liver Chemistries - Alcohol hepatitis:   Mild downtrend in LFTs today: AST/ALT 35/26, , and total bilirubin 12.88  Monitor and trend LFTs daily along with INR; no indication for prednisolone  Avoid hepatotoxic medications, strongly encourage complete alcohol cessation  Consider liver biopsy outpatient given significant alkaline phosphatase elevation prior to admission  Recommend complete alcohol cessation moving forward  Additional pain and symptom management per primary team    # Ascites with  ZENAIDA/CKD:   Status post IR paracentesis on 6/13/2025 with removal of 1670 cc, negative for SBP  Studies not consistent with SBP however patient with increasing serum creatinine  Began on 25% albumin 25 g x3 doses due to worsening creatinine; diuretics currently on hold  Recommend nephrology consultation; will hold on midodrine/octreotide  Monitor and trend serum creatinine and electrolytes daily, monitor urine output    # Coffee Ground Emesis and Melena:  Status post EGD 6/13/2025 with evidence of grade D esophagitis; history of gastric dieulafoy lesion as well 2/2025  Continue on Protonix 40 mg twice daily by mouth  Continue on ceftriaxone x 5 days for SBP prophylaxis in setting of GI bleed and severe alcohol hepatitis  Monitor and trend hemoglobin, transfuse for goal greater than 7  Alert GI team of any concern for recurrent GI bleeding

## 2025-06-15 NOTE — ASSESSMENT & PLAN NOTE
Recent hemoglobin in the range of 7-9  Patient presented with abdominal pain, had coffee-ground emesis and melena on this admission  Underwent EGD which showed severe esophagitis most likely the cause of the coffee-ground emesis and melena  Received a unit of blood on 6/14  Hemoglobin stable today at 8.5

## 2025-06-15 NOTE — CASE MANAGEMENT
Case Management Assessment & Discharge Planning Note    Patient name Wes Araujo  Location [facility] 2 /South 2 M* MRN [id number]  : 1970 Date 6/15/2025       Current Admission Date: 2025  Current Admission Diagnosis:Alcoholic hepatitis with ascites   Patient Active Problem List    Diagnosis Date Noted    CKD (chronic kidney disease) stage 4, GFR 15-29 ml/min (HCC) 2025    Alcohol abuse 2025    Constipation 2025    Coffee ground emesis 2025    Melena 2025    Chronic bilateral low back pain with bilateral sciatica 2025    Hyperkalemia 2025    Acute kidney injury (HCC) 2025    CKD stage 3b, GFR 30-44 ml/min (HCC) 2025    Other specified anemias 2025    Elevated LFTs 2025    Micronesian  needed 2025    Electrolyte abnormality 2025    Abnormal LFTs 2025    Abdominal pain 2025    CKD stage 3a, GFR 45-59 ml/min (HCC) 2025    Alcohol-induced chronic pancreatitis (HCC) 2024    Toxic metabolic encephalopathy 2024    Seizure (HCC) 2022    Generalized weakness 2022    Bilateral hearing loss 2020    Acute renal failure superimposed on stage 3 chronic kidney disease (HCC) 2019    Anemia 2019    Hyponatremia 2019    History of atrial fibrillation 2019    Metabolic acidosis 2019    ZENAIDA (acute kidney injury) (HCC) 2019    Primary hypertension     Type 2 diabetes mellitus with hyperglycemia, with long-term current use of insulin (HCC)     Alcoholic hepatitis with ascites     Paroxysmal A-fib (HCC)     Chronic pancreatitis (HCC)       LOS (days): 3  Geometric Mean LOS (GMLOS) (days):   Days to GMLOS:     OBJECTIVE:  PATIENT READMITTED TO HOSPITAL  Risk of Unplanned Readmission Score: 41.09         Current admission status: Inpatient       Preferred Pharmacy:    [email or web address] - [place] - [address] [place] [address]  [facility] [address]  Phone: [phone] Fax: [phone]    [facility] - [place] - [address]  [facility],  [address]  [facility],  First Floor [place]  [facility] [address]  Phone: [phone] Fax: [phone]    [id number] [place] - [address] [facility]  [address] [facility] [address]  Phone: [phone] Fax: [phone]    Primary Care Provider: Rush Kebede MD    Primary Insurance: [facility]  Secondary Insurance:     ASSESSMENT:  Active Health Care Proxies       Wes Araujo [facility] Representative - Father   Primary Phone: [phone] (Mobile)  Home Phone: [phone]                 Advance Directives  Does patient have a Health Care POA?: No  Was patient offered paperwork?: Yes (Pt declined)  Does patient currently have a Health Care decision maker?: Yes, please see Health Care Proxy section  Does patient have Advance Directives?: No  Was patient offered paperwork?: Yes (Pt declined)  Primary Contact: Elroy Araujo (Brother)  [phone]         Readmission Root Cause  30 Day Readmission: Yes  During your hospital stay, did someone (provider, nurse, ) explain your care to you in a way you could understand?: Yes  Did you feel medically stable to leave the hospital?: Yes  Were you able to pay for your medication at the pharmacy?: Yes  Did you have reliable transportation to take you to your appointments?: Yes  During previous admission, was a post-acute recommendation made?: No    Patient Information  Admitted from:: Home  Mental Status: Alert  During Assessment patient was accompanied by: Not accompanied during assessment  Assessment information provided by:: Patient  Primary Caregiver: Self  Support Systems: Self, Parent  County of Residence: [place]  What city do you live in?: [place]  Home entry access options. Select all that apply.: Stairs  Number of steps to enter home.: One Flight  Do the steps have  railings?: Yes  Type of Current Residence: Apartment  Floor Level: 2  Upon entering residence, is there a bedroom on the main floor (no further steps)?: Yes  Upon entering residence, is there a bathroom on the main floor (no further steps)?: Yes  Living Arrangements: Lives w/ Parent(s)  Is patient a ?: No    Activities of Daily Living Prior to Admission  Functional Status: Independent  Completes ADLs independently?: Yes  Ambulates independently?: Yes  Does patient use assisted devices?: Yes  Assisted Devices (DME) used: Walker  Does patient currently own DME?: Yes  What DME does the patient currently own?: Walker  Does patient have a history of Outpatient Therapy (PT/OT)?: No  Does the patient have a history of Short-Term Rehab?: No  Does patient have a history of HHC?: No  Does patient currently have HHC?: No    Current Home Health Care  [facility] Name[facility]    Patient Information Continued  Income Source: Unemployed  Does patient have prescription coverage?: Yes  Can the patient afford their medications and any related supplies (such as glucometers or test strips)?: Yes  Does patient receive dialysis treatments?: No  Does patient have a history of substance abuse?: Yes  Historical substance use preference: Alcohol/ETOH  History of Withdrawal Symptoms: Denies past symptoms  Is patient currently in treatment for substance abuse?: No. Patient declined treatment information. (Pt stated he his drinking has decreased a lot and he does not need resources)  Does patient have a history of Mental Health Diagnosis?: No         Means of Transportation  Means of Transport to Appts:: Family transport          DISCHARGE DETAILS:    Discharge planning discussed with:: Pt at bedside  [place] of Choice: Yes  Comments - Freedom of Choice: Pt made aware he had one accepting provider and CM could broaden refferal if he wanted; pt declined need.  CM contacted family/caregiver?: No- see comments (Pt stated he  has been in contact w/ his family)  Were Treatment Team discharge recommendations reviewed with patient/caregiver?: Yes  Did patient/caregiver verbalize understanding of patient care needs?: Yes  Were patient/caregiver advised of the risks associated with not following Treatment Team discharge recommendations?: Yes         Requested Home Health Care         Is the patient interested in HHC at discharge?: Yes  Home Health Discipline requested:: Nursing, Occupational Therapy, Physical Therapy  [facility] Agency Name:: [facility]  HHA External Referral Reason (only applicable if external HHA name selected): Scheduling access issues  Home Health Follow-Up Provider:: Referring Provider  Home Health Services Needed:: Gait/ADL Training, Strengthening/Theraputic Exercises to Improve Function  Homebound Criteria Met:: Uses an Assist Device (i.e. cane, walker, etc)  Supporting Clincal Findings:: Limited Endurance, Fatigues Easliy in Short Distances    DME Referral Provided  Referral made for DME?: Yes  DME referral completed for the following items:: Walker (Walker delievered to pt at bedside.)  DME Supplier Name:: [employer]    Other Referral/Resources/Interventions Provided:  Interventions: HHA, DME  Referral Comments: HHA reserved in Aidin, Walker provided to pt at bedside.         Treatment Team Recommendation: Home with Home Health Care  Expected Discharge Disposition: Home Health Services  Additional Discharge Dispositions: Home Health Services  Transport at Discharge : Family   CM spoke with patient at bedside and introduced self and role. [employer] Singaporean interpretor #[id number] was utilized for the duration of the conversation. PTA pt resided with his parents in a second story apartment w/ a flight of KERRI. Pt ambulates and performs all ADLs independently. Pt fatigues at longer distances. CM Yaneth Gray ordered pt a walker; this CM dispensed to pt at bedside from consignment. CM reviewed w/ pt he had no copay & got  "DME form signature. Pt does not own or utilize any other DME. CM reviewed PT/OT recommendation: Level III w/ patient at bedside. Pt had one accepting provider; [facility]. CM offered to expand referral; pt declined need and was agreeable to [facility]. CM reserved HHA in Aidin & provided HHA w/ update.CM inquired about [facility] and offered HOST resources/referral. Pt declined need for resources and indicated, \"His drinking used to be a problem, but has lessened\". CM department to continue to follow.     At time of d/c, pt's family can provide transport.         "

## 2025-06-15 NOTE — ASSESSMENT & PLAN NOTE
Lab Results   Component Value Date    EGFR 21 06/15/2025    EGFR 24 06/14/2025    EGFR 28 06/13/2025    CREATININE 3.05 (H) 06/15/2025    CREATININE 2.81 (H) 06/14/2025    CREATININE 2.42 (H) 06/13/2025

## 2025-06-15 NOTE — PLAN OF CARE
Problem: PAIN - ADULT  Goal: Verbalizes/displays adequate comfort level or baseline comfort level  Description: Interventions:  - Encourage patient to monitor pain and request assistance  - Assess pain using appropriate pain scale  - Administer analgesics as ordered based on type and severity of pain and evaluate response  - Implement non-pharmacological measures as appropriate and evaluate response  - Consider cultural and social influences on pain and pain management  - Notify physician/advanced practitioner if interventions unsuccessful or patient reports new pain  - Educate patient/family on pain management process including their role and importance of  reporting pain   - Provide non-pharmacologic/complimentary pain relief interventions  Outcome: Progressing     Problem: INFECTION - ADULT  Goal: Absence or prevention of progression during hospitalization  Description: INTERVENTIONS:  - Assess and monitor for signs and symptoms of infection  - Monitor lab/diagnostic results  - Monitor all insertion sites, i.e. indwelling lines, tubes, and drains  - Monitor endotracheal if appropriate and nasal secretions for changes in amount and color  - [place] appropriate cooling/warming therapies per order  - Administer medications as ordered  - Instruct and encourage patient and family to use good hand hygiene technique  - Identify and instruct in appropriate isolation precautions for identified infection/condition  Outcome: Progressing  Goal: Absence of fever/infection during neutropenic period  Description: INTERVENTIONS:  - Monitor WBC  - Perform strict hand hygiene  - Limit to healthy visitors only  - No plants, dried, fresh or silk flowers with mcclure in patient room  Outcome: Progressing     Problem: SAFETY ADULT  Goal: Patient will remain free of falls  Description: INTERVENTIONS:  - Educate patient/family on patient safety including physical limitations  - Instruct patient to call for assistance with activity   -  Consider consulting OT/PT to assist with strengthening/mobility based on AM PAC & JH-HLM score  - Consult OT/PT to assist with strengthening/mobility   - Keep Call bell within reach  - Keep bed low and locked with side rails adjusted as appropriate  - Keep care items and personal belongings within reach  - Initiate and maintain comfort rounds  - Make Fall Risk Sign visible to staff  - Offer Toileting every 2 Hours, in advance of need  - Initiate/Maintain bed alarm  - Obtain necessary fall risk management equipment: yellow socks.  - Apply yellow socks and bracelet for high fall risk patients  - Consider moving patient to room near nurses station  Outcome: Progressing  Goal: Maintain or return to baseline ADL function  Description: INTERVENTIONS:  -  Assess patient's ability to carry out ADLs; assess patient's baseline for ADL function and identify physical deficits which impact ability to perform ADLs (bathing, care of mouth/teeth, toileting, grooming, dressing, etc.)  - Assess/evaluate cause of self-care deficits   - Assess range of motion  - Assess patient's mobility; develop plan if impaired  - Assess patient's need for assistive devices and provide as appropriate  - Encourage maximum independence but intervene and supervise when necessary  - Involve family in performance of ADLs  - Assess for home care needs following discharge   - Consider OT consult to assist with ADL evaluation and planning for discharge  - Provide patient education as appropriate  - Monitor functional capacity and physical performance, use of AM PAC & JH-HLM   - Monitor gait, balance and fatigue with ambulation    Outcome: Progressing  Goal: Maintains/Returns to pre admission functional level  Description: INTERVENTIONS:  - Perform AM-PAC 6 Click Basic Mobility/ Daily Activity assessment daily.  - Set and communicate daily mobility goal to care team and patient/family/caregiver.   - Collaborate with rehabilitation services on mobility goals  if consulted  - Perform Range of Motion 3 times a day.  - Reposition patient every 2 hours.  - Dangle patient 3 times a day  - Stand patient 3 times a day  - Ambulate patient 3 times a day  - Out of bed to chair 3 times a day   - Out of bed for meals 3 times a day  - Out of bed for toileting  - Record patient progress and toleration of activity level   Outcome: Progressing     Problem: DISCHARGE PLANNING  Goal: Discharge to home or other facility with appropriate resources  Description: INTERVENTIONS:  - Identify barriers to discharge w/patient and caregiver  - Arrange for needed discharge resources and transportation as appropriate  - Identify discharge learning needs (meds, wound care, etc.)  - Arrange for interpretive services to assist at discharge as needed  - Refer to Case Management Department for coordinating discharge planning if the patient needs post-hospital services based on physician/advanced practitioner order or complex needs related to functional status, cognitive ability, or social support system  Outcome: Progressing     Problem: Knowledge Deficit  Goal: Patient/family/caregiver demonstrates understanding of disease process, treatment plan, medications, and discharge instructions  Description: Complete learning assessment and assess knowledge base.  Interventions:  - Provide teaching at level of understanding  - Provide teaching via preferred learning methods  Outcome: Progressing     Problem: GASTROINTESTINAL - ADULT  Goal: Minimal or absence of nausea and/or vomiting  Description: INTERVENTIONS:  - Administer IV fluids if ordered to ensure adequate hydration  - Maintain NPO status until nausea and vomiting are resolved  - Nasogastric tube if ordered  - Administer ordered antiemetic medications as needed  - Provide nonpharmacologic comfort measures as appropriate  - Advance diet as tolerated, if ordered  - Consider nutrition services referral to assist patient with adequate nutrition and  appropriate food choices  Outcome: Progressing  Goal: Maintains or returns to baseline bowel function  Description: INTERVENTIONS:  - Assess bowel function  - Encourage oral fluids to ensure adequate hydration  - Administer IV fluids if ordered to ensure adequate hydration  - Administer ordered medications as needed  - Encourage mobilization and activity  - Consider nutritional services referral to assist patient with adequate nutrition and appropriate food choices  Outcome: Progressing  Goal: Maintains adequate nutritional intake  Description: INTERVENTIONS:  - Monitor percentage of each meal consumed  - Identify factors contributing to decreased intake, treat as appropriate  - Assist with meals as needed  - Monitor I&O, weight, and lab values if indicated  - Obtain nutrition services referral as needed  Outcome: Progressing     Problem: METABOLIC, FLUID AND ELECTROLYTES - ADULT  Goal: Electrolytes maintained within normal limits  Description: INTERVENTIONS:  - Monitor labs and assess patient for signs and symptoms of electrolyte imbalances  - Administer electrolyte replacement as ordered  - Monitor response to electrolyte replacements, including repeat lab results as appropriate  - Instruct patient on fluid and nutrition as appropriate  Outcome: Progressing  Goal: Fluid balance maintained  Description: INTERVENTIONS:  - Monitor labs   - Monitor I/O and WT  - Instruct patient on fluid and nutrition as appropriate  - Assess for signs & symptoms of volume excess or deficit  Outcome: Progressing  Goal: Glucose maintained within target range  Description: INTERVENTIONS:  - Monitor Blood Glucose as ordered  - Assess for signs and symptoms of hyperglycemia and hypoglycemia  - Administer ordered medications to maintain glucose within target range  - Assess nutritional intake and initiate nutrition service referral as needed  Outcome: Progressing     Problem: Nutrition/Hydration-ADULT  Goal: Nutrient/Hydration intake  appropriate for improving, restoring or maintaining nutritional needs  Description: Monitor and assess patient's nutrition/hydration status for malnutrition. Collaborate with interdisciplinary team and initiate plan and interventions as ordered.  Monitor patient's weight and dietary intake as ordered or per policy. Utilize nutrition screening tool and intervene as necessary. Determine patient's food preferences and provide high-protein, high-caloric foods as appropriate.     INTERVENTIONS:  - Monitor oral intake, urinary output, labs, and treatment plans  - Assess nutrition and hydration status and recommend course of action  - Evaluate amount of meals eaten  - Assist patient with eating if necessary   - Allow adequate time for meals  - Recommend/ encourage appropriate diets, oral nutritional supplements, and vitamin/mineral supplements  - Order, calculate, and assess calorie counts as needed  - Recommend, monitor, and adjust tube feedings and TPN/PPN based on assessed needs  - Assess need for intravenous fluids  - Provide specific nutrition/hydration education as appropriate  - Include patient/family/caregiver in decisions related to nutrition  Outcome: Progressing     Problem: Prexisting or High Potential for Compromised Skin Integrity  Goal: Skin integrity is maintained or improved  Description: INTERVENTIONS:  - Identify patients at risk for skin breakdown  - Assess and monitor skin integrity including under and around medical devices   - Assess and monitor nutrition and hydration status  - Monitor labs  - Assess for incontinence   - Turn and reposition patient  - Assist with mobility/ambulation  - Relieve pressure over keith prominences   - Avoid friction and shearing  - Provide appropriate hygiene as needed including keeping skin clean and dry  - Evaluate need for skin moisturizer/barrier cream  - Collaborate with interdisciplinary team  - Patient/family teaching  - Consider wound care consult    Assess:  -  Review Jose scale daily  - Clean and moisturize skin every shift  - Inspect skin when repositioning, toileting, and assisting with ADLS  - Assess under medical devices such as masimo every shift  - Assess extremities for adequate circulation and sensation     Bed Management:  - Have minimal linens on bed & keep smooth, unwrinkled  - Change linens as needed when moist or perspiring  - Avoid sitting or lying in one position for more than 2 hours while in bed?Keep HOB at 30 degrees   - Toileting:  - Offer bedside commode  - Assess for incontinence every 2 hrs  - Use incontinent care products after each incontinent episode such as lotion    Activity:  - Mobilize patient 3 times a day  - Encourage activity and walks on unit  - Encourage or provide ROM exercises   - Turn and reposition patient every 2 Hours  - Use appropriate equipment to lift or move patient in bed  - Instruct/ Assist with weight shifting every 1 hr when out of bed in chair  - Consider limitation of chair time 1 hour intervals    Skin Care:  - Avoid use of baby powder, tape, friction and shearing, hot water or constrictive clothing  - Relieve pressure over bony prominences using allevyn  - Do not massage red bony areas    Next Steps:  - Teach patient strategies to minimize risks such as repositioning   - Consider consults to  interdisciplinary teams such as PTOT  Outcome: Progressing     Problem: Potential for Falls  Goal: Patient will remain free of falls  Description: INTERVENTIONS:  - Educate patient/family on patient safety including physical limitations  - Instruct patient to call for assistance with activity   - Consider consulting OT/PT to assist with strengthening/mobility based on AM PAC & JH-HLM score  - Consult OT/PT to assist with strengthening/mobility   - Keep Call bell within reach  - Keep bed low and locked with side rails adjusted as appropriate  - Keep care items and personal belongings within reach  - Initiate and maintain comfort  rounds  - Make Fall Risk Sign visible to staff  - Offer Toileting every 2 Hours, in advance of need  - Initiate/Maintain bed alarm  - Obtain necessary fall risk management equipment: yellow socks.  - Apply yellow socks and bracelet for high fall risk patients  - Consider moving patient to room near nurses station  Outcome: Progressing

## 2025-06-15 NOTE — ASSESSMENT & PLAN NOTE
Wes Araujo is a 55-year-old male with a past medical history of hypertension, CKD, diabetes mellitus type 2, chronic pancreatitis secondary to chronic alcohol use, and recent admission for alc hep with ZENAIDA on CKD who presented to [facility] on 6/12 for concern of recurrent abdominal distension with associated pain.   Labs on presentation significant for continued improvement is AST/ALT/ALP since last admission, however, bilirubin continues to increase.  INR 1.18 on presentation (peaked last admission at 5.53 with dramatic improvement after Vitamin K). Kidney function appears to have improved from discharge and remains stable at this time.     Patient previously underwent serologic liver workup negative for alternative cause of liver injury but elevations thought to be 2/2 alc hep in setting of some degree of underlying fibrosis.  Patient was treated with conservative management and no steroids due to low Madrey's score.  Patient was recommended to follow-up with gastroenterology but now her presenting with recurrent abdominal distention/pain along with coffee-ground emesis and melena.  Patient now status post EGD which revealed grade D esophagitis.    Pamela's Discriminant Function - Control Prothrombin 13: 27.51 at 6/14/2025  4:47 AM  Calculated from:  Total Bilirubin: 12.79 mg/dL at 6/14/2025  4:47 AM  Prothrombin Time: 16.2 seconds at 6/13/2025  5:53 AM  MDF = (4.6 * (PT - Control PT)) + Bilirubin     # Elevated Liver Chemistries - Alcohol hepatitis:   Mild downtrend in LFTs today: AST/ALT 35/26, , and total bilirubin 12.88  Monitor and trend LFTs daily along with INR; no indication for prednisolone  Avoid hepatotoxic medications, strongly encourage complete alcohol cessation  Consider liver biopsy outpatient given significant alkaline phosphatase elevation prior to admission  Recommend complete alcohol cessation moving forward  Additional pain and symptom management per primary team    # Ascites with  ZENAIDA/CKD:   Status post IR paracentesis on 6/13/2025 with removal of 1670 cc, negative for SBP  Studies not consistent with SBP however patient with increasing serum creatinine  Began on 25% albumin 25 g x3 doses due to worsening creatinine; diuretics currently on hold  Recommend nephrology consultation; will hold on midodrine/octreotide  Monitor and trend serum creatinine and electrolytes daily, monitor urine output    # Coffee Ground Emesis and Melena:  Status post EGD 6/13/2025 with evidence of grade D esophagitis; history of gastric dieulafoy lesion as well 2/2025  Continue on Protonix 40 mg twice daily by mouth  Continue on ceftriaxone x 5 days for SBP prophylaxis in setting of GI bleed and severe alcohol hepatitis  Monitor and trend hemoglobin, transfuse for goal greater than 7  Alert GI team of any concern for recurrent GI bleeding

## 2025-06-15 NOTE — PLAN OF CARE
Problem: PAIN - ADULT  Goal: Verbalizes/displays adequate comfort level or baseline comfort level  Description: Interventions:  - Encourage patient to monitor pain and request assistance  - Assess pain using appropriate pain scale  - Administer analgesics as ordered based on type and severity of pain and evaluate response  - Implement non-pharmacological measures as appropriate and evaluate response  - Consider cultural and social influences on pain and pain management  - Notify physician/advanced practitioner if interventions unsuccessful or patient reports new pain  - Educate patient/family on pain management process including their role and importance of  reporting pain   - Provide non-pharmacologic/complimentary pain relief interventions  Outcome: Progressing     Problem: INFECTION - ADULT  Goal: Absence or prevention of progression during hospitalization  Description: INTERVENTIONS:  - Assess and monitor for signs and symptoms of infection  - Monitor lab/diagnostic results  - Monitor all insertion sites, i.e. indwelling lines, tubes, and drains  - Monitor endotracheal if appropriate and nasal secretions for changes in amount and color  - [place] appropriate cooling/warming therapies per order  - Administer medications as ordered  - Instruct and encourage patient and family to use good hand hygiene technique  - Identify and instruct in appropriate isolation precautions for identified infection/condition  Outcome: Progressing  Goal: Absence of fever/infection during neutropenic period  Description: INTERVENTIONS:  - Monitor WBC  - Perform strict hand hygiene  - Limit to healthy visitors only  - No plants, dried, fresh or silk flowers with mcclure in patient room  Outcome: Progressing     Problem: SAFETY ADULT  Goal: Patient will remain free of falls  Description: INTERVENTIONS:  - Educate patient/family on patient safety including physical limitations  - Instruct patient to call for assistance with activity   -  Consider consulting OT/PT to assist with strengthening/mobility based on AM PAC & JH-HLM score  - Consult OT/PT to assist with strengthening/mobility   - Keep Call bell within reach  - Keep bed low and locked with side rails adjusted as appropriate  - Keep care items and personal belongings within reach  - Initiate and maintain comfort rounds  - Make Fall Risk Sign visible to staff  - Offer Toileting every 2 Hours, in advance of need  - Initiate/Maintain bed alarm  - Obtain necessary fall risk management equipment: yellow socks.  - Apply yellow socks and bracelet for high fall risk patients  - Consider moving patient to room near nurses station  Outcome: Progressing  Goal: Maintain or return to baseline ADL function  Description: INTERVENTIONS:  -  Assess patient's ability to carry out ADLs; assess patient's baseline for ADL function and identify physical deficits which impact ability to perform ADLs (bathing, care of mouth/teeth, toileting, grooming, dressing, etc.)  - Assess/evaluate cause of self-care deficits   - Assess range of motion  - Assess patient's mobility; develop plan if impaired  - Assess patient's need for assistive devices and provide as appropriate  - Encourage maximum independence but intervene and supervise when necessary  - Involve family in performance of ADLs  - Assess for home care needs following discharge   - Consider OT consult to assist with ADL evaluation and planning for discharge  - Provide patient education as appropriate  - Monitor functional capacity and physical performance, use of AM PAC & JH-HLM   - Monitor gait, balance and fatigue with ambulation    Outcome: Progressing  Goal: Maintains/Returns to pre admission functional level  Description: INTERVENTIONS:  - Perform AM-PAC 6 Click Basic Mobility/ Daily Activity assessment daily.  - Set and communicate daily mobility goal to care team and patient/family/caregiver.   - Collaborate with rehabilitation services on mobility goals  if consulted  - Perform Range of Motion 3 times a day.  - Reposition patient every 2 hours.  - Dangle patient 3 times a day  - Stand patient 3 times a day  - Ambulate patient 3 times a day  - Out of bed to chair 3 times a day   - Out of bed for meals 3 times a day  - Out of bed for toileting  - Record patient progress and toleration of activity level   Outcome: Progressing     Problem: Knowledge Deficit  Goal: Patient/family/caregiver demonstrates understanding of disease process, treatment plan, medications, and discharge instructions  Description: Complete learning assessment and assess knowledge base.  Interventions:  - Provide teaching at level of understanding  - Provide teaching via preferred learning methods  Outcome: Progressing     Problem: GASTROINTESTINAL - ADULT  Goal: Minimal or absence of nausea and/or vomiting  Description: INTERVENTIONS:  - Administer IV fluids if ordered to ensure adequate hydration  - Maintain NPO status until nausea and vomiting are resolved  - Nasogastric tube if ordered  - Administer ordered antiemetic medications as needed  - Provide nonpharmacologic comfort measures as appropriate  - Advance diet as tolerated, if ordered  - Consider nutrition services referral to assist patient with adequate nutrition and appropriate food choices  Outcome: Progressing  Goal: Maintains or returns to baseline bowel function  Description: INTERVENTIONS:  - Assess bowel function  - Encourage oral fluids to ensure adequate hydration  - Administer IV fluids if ordered to ensure adequate hydration  - Administer ordered medications as needed  - Encourage mobilization and activity  - Consider nutritional services referral to assist patient with adequate nutrition and appropriate food choices  Outcome: Progressing  Goal: Maintains adequate nutritional intake  Description: INTERVENTIONS:  - Monitor percentage of each meal consumed  - Identify factors contributing to decreased intake, treat as  appropriate  - Assist with meals as needed  - Monitor I&O, weight, and lab values if indicated  - Obtain nutrition services referral as needed  Outcome: Progressing     Problem: METABOLIC, FLUID AND ELECTROLYTES - ADULT  Goal: Electrolytes maintained within normal limits  Description: INTERVENTIONS:  - Monitor labs and assess patient for signs and symptoms of electrolyte imbalances  - Administer electrolyte replacement as ordered  - Monitor response to electrolyte replacements, including repeat lab results as appropriate  - Instruct patient on fluid and nutrition as appropriate  Outcome: Progressing  Goal: Fluid balance maintained  Description: INTERVENTIONS:  - Monitor labs   - Monitor I/O and WT  - Instruct patient on fluid and nutrition as appropriate  - Assess for signs & symptoms of volume excess or deficit  Outcome: Progressing  Goal: Glucose maintained within target range  Description: INTERVENTIONS:  - Monitor Blood Glucose as ordered  - Assess for signs and symptoms of hyperglycemia and hypoglycemia  - Administer ordered medications to maintain glucose within target range  - Assess nutritional intake and initiate nutrition service referral as needed  Outcome: Progressing     Problem: Nutrition/Hydration-ADULT  Goal: Nutrient/Hydration intake appropriate for improving, restoring or maintaining nutritional needs  Description: Monitor and assess patient's nutrition/hydration status for malnutrition. Collaborate with interdisciplinary team and initiate plan and interventions as ordered.  Monitor patient's weight and dietary intake as ordered or per policy. Utilize nutrition screening tool and intervene as necessary. Determine patient's food preferences and provide high-protein, high-caloric foods as appropriate.     INTERVENTIONS:  - Monitor oral intake, urinary output, labs, and treatment plans  - Assess nutrition and hydration status and recommend course of action  - Evaluate amount of meals eaten  - Assist  patient with eating if necessary   - Allow adequate time for meals  - Recommend/ encourage appropriate diets, oral nutritional supplements, and vitamin/mineral supplements  - Order, calculate, and assess calorie counts as needed  - Recommend, monitor, and adjust tube feedings and TPN/PPN based on assessed needs  - Assess need for intravenous fluids  - Provide specific nutrition/hydration education as appropriate  - Include patient/family/caregiver in decisions related to nutrition  Outcome: Progressing     Problem: Prexisting or High Potential for Compromised Skin Integrity  Goal: Skin integrity is maintained or improved  Description: INTERVENTIONS:  - Identify patients at risk for skin breakdown  - Assess and monitor skin integrity including under and around medical devices   - Assess and monitor nutrition and hydration status  - Monitor labs  - Assess for incontinence   - Turn and reposition patient  - Assist with mobility/ambulation  - Relieve pressure over keith prominences   - Avoid friction and shearing  - Provide appropriate hygiene as needed including keeping skin clean and dry  - Evaluate need for skin moisturizer/barrier cream  - Collaborate with interdisciplinary team  - Patient/family teaching  - Consider wound care consult    Assess:  - Review Jose scale daily  - Clean and moisturize skin every   - Inspect skin when repositioning, toileting, and assisting with ADLS  - Assess under medical devices such as  every   - Assess extremities for adequate circulation and sensation     Bed Management:  - Have minimal linens on bed & keep smooth, unwrinkled  - Change linens as needed when moist or perspiring  - Avoid sitting or lying in one position for more than  hours while in bed?Keep HOB at degrees   - Toileting:  - Offer bedside commode  - Assess for incontinence every   - Use incontinent care products after each incontinent episode such as     Activity:  - Mobilize patient  times a day  - Encourage  activity and walks on unit  - Encourage or provide ROM exercises   - Turn and reposition patient every  Hours  - Use appropriate equipment to lift or move patient in bed  - Instruct/ Assist with weight shifting every  when out of bed in chair  - Consider limitation of chair time hour intervals    Skin Care:  - Avoid use of baby powder, tape, friction and shearing, hot water or constrictive clothing  - Relieve pressure over bony prominences using   - Do not massage red bony areas    Next Steps:  - Teach patient strategies to minimize risks such as   - Consider consults to  interdisciplinary teams such as   Outcome: Progressing     Problem: Potential for Falls  Goal: Patient will remain free of falls  Description: INTERVENTIONS:  - Educate patient/family on patient safety including physical limitations  - Instruct patient to call for assistance with activity   - Consider consulting OT/PT to assist with strengthening/mobility based on AM PAC & -HLM score  - Consult OT/PT to assist with strengthening/mobility   - Keep Call bell within reach  - Keep bed low and locked with side rails adjusted as appropriate  - Keep care items and personal belongings within reach  - Initiate and maintain comfort rounds  - Make Fall Risk Sign visible to staff  - Offer Toileting every 2 Hours, in advance of need  - Initiate/Maintain bed alarm  - Obtain necessary fall risk management equipment: yellow socks.  - Apply yellow socks and bracelet for high fall risk patients  - Consider moving patient to room near nurses station  Outcome: Progressing

## 2025-06-15 NOTE — ASSESSMENT & PLAN NOTE
Wes Araujo is a 55-year-old male with a past medical history of hypertension, CKD, diabetes mellitus type 2, chronic pancreatitis secondary to chronic alcohol use, and recent admission for alc hep with ZENAIDA on CKD who presented to [facility] on 6/12 for concern of recurrent abdominal distension with associated pain.   Labs on presentation significant for continued improvement is AST/ALT/ALP since last admission, however, bilirubin continues to increase.  INR 1.18 on presentation (peaked last admission at 5.53 with dramatic improvement after Vitamin K). Kidney function appears to have improved from discharge and remains stable at this time.     Patient previously underwent serologic liver workup negative for alternative cause of liver injury but elevations thought to be 2/2 alc hep in setting of some degree of underlying fibrosis.  Patient was treated with conservative management and no steroids due to low Madrey's score.  Patient was recommended to follow-up with gastroenterology but now her presenting with recurrent abdominal distention/pain along with coffee-ground emesis and melena.  Patient now status post EGD which revealed grade D esophagitis.    Pamela's Discriminant Function - Control Prothrombin 13: 27.51 at 6/14/2025  4:47 AM  Calculated from:  Total Bilirubin: 12.79 mg/dL at 6/14/2025  4:47 AM  Prothrombin Time: 16.2 seconds at 6/13/2025  5:53 AM  MDF = (4.6 * (PT - Control PT)) + Bilirubin     # Elevated Liver Chemistries - Alcohol hepatitis:   Mild downtrend in LFTs today: AST/ALT 35/26, , and total bilirubin 12.88  Monitor and trend LFTs daily along with INR; no indication for prednisolone  Avoid hepatotoxic medications, strongly encourage complete alcohol cessation  Consider liver biopsy outpatient given significant alkaline phosphatase elevation prior to admission  Recommend complete alcohol cessation moving forward  Additional pain and symptom management per primary team    # Ascites with  ZENAIDA/CKD:   Status post IR paracentesis on 6/13/2025 with removal of 1670 cc, negative for SBP  Studies not consistent with SBP however patient with increasing serum creatinine  Began on 25% albumin 25 g x3 doses due to worsening creatinine; diuretics currently on hold  Recommend nephrology consultation; will hold on midodrine/octreotide  Monitor and trend serum creatinine and electrolytes daily, monitor urine output    # Coffee Ground Emesis and Melena:  Status post EGD 6/13/2025 with evidence of grade D esophagitis; history of gastric dieulafoy lesion as well 2/2025  Continue on Protonix 40 mg twice daily by mouth  Continue on ceftriaxone x 5 days for SBP prophylaxis in setting of GI bleed and severe alcohol hepatitis  Monitor and trend hemoglobin, transfuse for goal greater than 7  Alert GI team of any concern for recurrent GI bleeding

## 2025-06-15 NOTE — ASSESSMENT & PLAN NOTE
Patient is a 55-year-old male with a past medical history of alcohol abuse, alcoholic liver cirrhosis, anemia, hypertension, type 2 diabetes, history of atrial fibrillation, chronic pancreatitis who presented to the hospital with abdominal pain and distention.  Overnight on the day of admission developed coffee-ground emesis and melena  Patient was admitted from 5/23/2025 to 6/6/2025 secondary to toxic metabolic encephalopathy secondary to hyperglycemia, HHNK, kidney injury and acute liver failure.  Received insulin, IV fluids, seen by gastroenterology, DF did not meet criteria for steroids, infection was ruled out, initial plan was for patient to be discharged to inpatient alcoholic rehab but patient refused and he was discharged home    6/12/2025 CT showed large volume ascites, increased since 6/3/2025   6/13/2025 underwent paracentesis 1670 cc clear fluid removed, no evidence of SBP, fluid cultures are pending  6/13/2025 underwent EGD which showed esophagitis.  No esophageal or gastric varices.  Ceftriaxone for 5 days for SBP prophylaxis in the setting of GI bleed  No indication for prednisolone  Due to slight worsening of kidney functions start albumin 25% 25 mg every 8 hours x 3  Followed by GI

## 2025-06-15 NOTE — PROGRESS NOTES
Progress Note - Hospitalist   Name: Wes Araujo 55 y.o. male I MRN: [id number]  Unit/Bed#: [place] [address] -01 I Date of Admission: 6/12/2025   Date of Service: 6/15/2025 I Hospital Day: 3    Assessment & Plan  Alcoholic hepatitis with ascites  Patient is a 55-year-old male with a past medical history of alcohol abuse, alcoholic liver cirrhosis, anemia, hypertension, type 2 diabetes, history of atrial fibrillation, chronic pancreatitis who presented to the hospital with abdominal pain and distention.  Overnight on the day of admission developed coffee-ground emesis and melena  Patient was admitted from 5/23/2025 to 6/6/2025 secondary to toxic metabolic encephalopathy secondary to hyperglycemia, HHNK, kidney injury and acute liver failure.  Received insulin, IV fluids, seen by gastroenterology, DF did not meet criteria for steroids, infection was ruled out, initial plan was for patient to be discharged to inpatient alcoholic rehab but patient refused and he was discharged home    6/12/2025 CT showed large volume ascites, increased since 6/3/2025   6/13/2025 underwent paracentesis 1670 cc clear fluid removed, no evidence of SBP, fluid cultures are pending  6/13/2025 underwent EGD which showed esophagitis.  No esophageal or gastric varices.  Ceftriaxone for 5 days for SBP prophylaxis in the setting of GI bleed  No indication for prednisolone  Due to slight worsening of kidney functions start albumin 25% 25 mg every 8 hours x 3  Followed by GI  Coffee ground emesis  Night of admission 6/12/2025 developed coffee-ground emesis and melena  6/13/2025 underwent EGD which showed esophagitis, no esophageal or gastric varices.  Rest of the exam normal  Per GI suspect coffee-ground emesis in the setting of severe esophagitis  6/14 for hemoglobin of 6.9 received a unit of blood  Hemoglobin today 8.5  No signs of GI bleed  Continue Protonix 40 mg twice daily  Transfuse if hemoglobin less than 7  Melena  Secondary to severe  esophagitis  See plan above  Anemia  Recent hemoglobin in the range of 7-9  Patient presented with abdominal pain, had coffee-ground emesis and melena on this admission  Underwent EGD which showed severe esophagitis most likely the cause of the coffee-ground emesis and melena  Received a unit of blood on 6/14  Hemoglobin stable today at 8.5  CKD (chronic kidney disease) stage 4, GFR 15-29 ml/min ([facility])  Lab Results   Component Value Date    EGFR 21 06/15/2025    EGFR 24 06/14/2025    EGFR 28 06/13/2025    CREATININE 3.05 (H) 06/15/2025    CREATININE 2.81 (H) 06/14/2025    CREATININE 2.42 (H) 06/13/2025   Seen by nephrology on his previous admission, last seen on 6/6/2025  Per nephro: Baseline creatinine has been from 1.5-2.0 but on last admission his creatinine plateaued at 2.8-3.0 which may represent his new baseline due to his previous advanced CKD and recent ZENAIDA  He is high risk for dialysis in the near future  Creatinine today 3.05  Discussed with GI, will start albumin 25% 25 g every 8 hours x 3  Not on diuretics  If kidney function worsens consider nephrology consult tomorrow  Recheck labs tomorrow  Alcohol abuse  Pt with history of alcohol abuse with withdrawal. He states he typically drinks multiple beer a day, but states he has not been drinking since he was discharged from the hospital last week  ETOH <10  Monitor on CIWA for now  Vitamin supplementation  Encourage continued cessation  Type 2 diabetes mellitus with hyperglycemia, with long-term current use of insulin ([facility])  Lab Results   Component Value Date    HGBA1C 9.6 (H) 05/26/2025       Recent Labs     06/14/25  2049 06/15/25  0722 06/15/25  1050 06/15/25  1546   POCGLU 291* 198* 120 146*       Blood Sugar Average: Last 72 hrs:  (P) [id number]  HHNK on his previous admission due to noncompliance and alcohol abuse  Home regimen is 70/30, 8 units twice daily, which was held due to coffee-ground emesis  Restarted 70/30 at 8 units twice daily on  6/14  Generalized weakness  Pt reports that since discharge he has been having difficulty with ambulation  Typically he ambulates without assistive devices, but he reports he has been struggling  PT/OT eval  Primary hypertension  Continue amlodipine 5 mg twice daily, hydralazine 25 mg 3 times daily, and Coreg 6.25 twice daily with holding parameters  Chronic pancreatitis (HCC)  Patient with a history of chronic pancreatitis secondary to alcohol   Chronic pancreatitis stable on CT imaging on admission    History of atrial fibrillation  History of atrial fibrillation, continue Coreg  Not on anticoagulation at baseline  Constipation  Pt reports no bowel movement for 6 days  Bowel regimen initiated    VTE Pharmacologic Prophylaxis: VTE Score: 1 Low Risk (Score 0-2) - Encourage Ambulation.  Hold chemical prophylaxis due to coffee-ground emesis    Mobility:   Basic Mobility Inpatient Raw Score: 18  JH-HLM Goal: 6: Walk 10 steps or more  JH-HLM Achieved: 7: Walk 25 feet or more  JH-HLM Goal NOT achieved. Continue with multidisciplinary rounding and encourage appropriate mobility to improve upon JH-HLM goals.    Patient Centered Rounds: discussed with nursing   Discussions with Specialists or Other Care Team Provider: nursing, gi    Education and Discussions with Family / Patient: Attempted to update  (father and brother) via phone. Unable to contact.    Current Length of Stay: 3 day(s)  Current Patient Status: Inpatient   Certification Statement: The patient will continue to require additional inpatient hospital stay due to monitor labs  Discharge Plan: Anticipate discharge in 24-48 hrs to home with home services.    Code Status: Level 1 - Full Code    Subjective   Patient was seen evaluated bedside, complaining of abdominal discomfort, urinating.    Objective :  Temp:  [98 °F (36.7 °C)-99 °F (37.2 °C)] 98.7 °F (37.1 °C)  HR:  [70-77] 75  BP: (108-144)/(56-72) 138/67  Resp:  [18-20] 20  SpO2:  [94 %-97 %] 96  %  O2 Device: None (Room air)    Body mass index is 21 kg/m².     Input and Output Summary (last 24 hours):     Intake/Output Summary (Last 24 hours) at 6/15/2025 1604  Last data filed at 6/15/2025 1544  Gross per 24 hour   Intake 560 ml   Output 0 ml   Net 560 ml       Physical Exam  Constitutional:       General: He is not in acute distress.     Appearance: He is ill-appearing.   HENT:      Head: Atraumatic.     Eyes:      General: Scleral icterus present.       Cardiovascular:      Rate and Rhythm: Normal rate and regular rhythm.      Heart sounds: No murmur heard.  Pulmonary:      Effort: Pulmonary effort is normal. No respiratory distress.      Breath sounds: Normal breath sounds. No wheezing.   Abdominal:      General: There is distension.      Palpations: Abdomen is soft.      Tenderness: There is abdominal tenderness.     Musculoskeletal:         General: No swelling.     Skin:     Coloration: Skin is jaundiced.     Neurological:      General: No focal deficit present.      Mental Status: He is alert and oriented to person, place, and time.     Psychiatric:         Mood and Affect: Mood normal.           Lines/Drains:              Lab Results: I have reviewed the following results:   Results from last 7 days   Lab Units 06/15/25  0523 06/14/25  0447   WBC Thousand/uL 12.40* 16.16*   HEMOGLOBIN g/dL 8.5* 6.9*   HEMATOCRIT % 24.2* 19.7*   PLATELETS Thousands/uL 178 198   SEGS PCT %  --  77*   LYMPHO PCT %  --  12*   MONO PCT %  --  8   EOS PCT %  --  2     Results from last 7 days   Lab Units 06/15/25  0523   SODIUM mmol/L 135   POTASSIUM mmol/L 4.8   CHLORIDE mmol/L 107   CO2 mmol/L 20*   BUN mg/dL 38*   CREATININE mg/dL 3.05*   ANION GAP mmol/L 8   CALCIUM mg/dL 8.7   ALBUMIN g/dL 3.2*   TOTAL BILIRUBIN mg/dL 12.88*   ALK PHOS U/L 295*   ALT U/L 26   AST U/L 35   GLUCOSE RANDOM mg/dL 207*     Results from last 7 days   Lab Units 06/13/25  0553   INR  1.28*     Results from last 7 days   Lab Units  06/15/25  1546 06/15/25  1050 06/15/25  0722 06/14/25  2049 06/14/25  1617 06/14/25  1124 06/14/25  0736 06/13/25  2136 06/13/25  1609 06/13/25  1106 06/13/25  0838 06/13/25  0724   POC GLUCOSE mg/dl 146* 120 198* 291* 225* 228* 267* 300* 151* 263* 292* 251*               Recent Cultures (last 7 days):   Results from last 7 days   Lab Units 06/13/25  1005   GRAM STAIN RESULT  Rare Polys  No bacteria seen   BODY FLUID CULTURE, STERILE  No growth       Imaging Results Review: I reviewed radiology reports from this admission including: procedure reports.  Other Study Results Review: No additional pertinent studies reviewed.    Last 24 Hours Medication List:     Current Facility-Administered Medications:     acetaminophen (TYLENOL) tablet 325 mg, Q8H PRN    albumin human (FLEXBUMIN) 25 % injection 25 g, Q8H    amLODIPine (NORVASC) tablet 5 mg, BID    bisacodyl (DULCOLAX) rectal suppository 10 mg, Daily PRN    carvedilol (COREG) tablet 6.25 mg, BID With Meals    cefTRIAXone (ROCEPHIN) 1,000 mg in dextrose 5 % 50 mL IVPB, Q24H, Last Rate: 1,000 mg (06/15/25 1600)    docusate sodium (COLACE) capsule 100 mg, BID    folic acid (FOLVITE) tablet 1 mg, Daily    hydrALAZINE (APRESOLINE) tablet 25 mg, Q8H JAISON    insulin aspart protamine-insulin aspart (NovoLOG 70/30) 100 units/mL subcutaneous injection 8 Units, BID AC    insulin lispro (HumALOG/ADMELOG) 100 units/mL subcutaneous injection 1-5 Units, TID AC **AND** Fingerstick Glucose (POCT), TID AC    insulin lispro (HumALOG/ADMELOG) 100 units/mL subcutaneous injection 1-5 Units, HS    [Held by provider] multivitamin-minerals (CENTRUM) tablet 1 tablet, Daily    pancrelipase (Lip-Prot-Amyl) (CREON) delayed release capsule 24,000 Units, TID With Meals    pantoprazole (PROTONIX) EC tablet 40 mg, BID AC    polyethylene glycol (MIRALAX) packet 17 g, Daily PRN    senna (SENOKOT) tablet 17.2 mg, HS    sodium bicarbonate tablet 1,300 mg, TID after meals    thiamine tablet 100 mg,  Daily    trimethobenzamide (TIGAN) IM injection 200 mg, Q6H PRN    Administrative Statements   Today, Patient Was Seen By: Naa Quiles MD      **Please Note: This note may have been constructed using a voice recognition system.**

## 2025-06-15 NOTE — ASSESSMENT & PLAN NOTE
Night of admission 6/12/2025 developed coffee-ground emesis and melena  6/13/2025 underwent EGD which showed esophagitis, no esophageal or gastric varices.  Rest of the exam normal  Per GI suspect coffee-ground emesis in the setting of severe esophagitis  6/14 for hemoglobin of 6.9 received a unit of blood  Hemoglobin today 8.5  No signs of GI bleed  Continue Protonix 40 mg twice daily  Transfuse if hemoglobin less than 7

## 2025-06-15 NOTE — ASSESSMENT & PLAN NOTE
Wes Araujo is a 55-year-old male with a past medical history of hypertension, CKD, diabetes mellitus type 2, chronic pancreatitis secondary to chronic alcohol use, and recent admission for alc hep with ZENAIDA on CKD who presented to [facility] on 6/12 for concern of recurrent abdominal distension with associated pain.   Labs on presentation significant for continued improvement is AST/ALT/ALP since last admission, however, bilirubin continues to increase.  INR 1.18 on presentation (peaked last admission at 5.53 with dramatic improvement after Vitamin K). Kidney function appears to have improved from discharge and remains stable at this time.     Patient previously underwent serologic liver workup negative for alternative cause of liver injury but elevations thought to be 2/2 alc hep in setting of some degree of underlying fibrosis.  Patient was treated with conservative management and no steroids due to low Madrey's score.  Patient was recommended to follow-up with gastroenterology but now her presenting with recurrent abdominal distention/pain along with coffee-ground emesis and melena.  Patient now status post EGD which revealed grade D esophagitis.    Pamela's Discriminant Function - Control Prothrombin 13: 27.51 at 6/14/2025  4:47 AM  Calculated from:  Total Bilirubin: 12.79 mg/dL at 6/14/2025  4:47 AM  Prothrombin Time: 16.2 seconds at 6/13/2025  5:53 AM  MDF = (4.6 * (PT - Control PT)) + Bilirubin     # Elevated Liver Chemistries - Alcohol hepatitis:   Mild downtrend in LFTs today: AST/ALT 35/26, , and total bilirubin 12.88  Monitor and trend LFTs daily along with INR; no indication for prednisolone  Avoid hepatotoxic medications, strongly encourage complete alcohol cessation  Consider liver biopsy outpatient given significant alkaline phosphatase elevation prior to admission  Recommend complete alcohol cessation moving forward  Additional pain and symptom management per primary team    # Ascites with  ZENAIDA/CKD:   Status post IR paracentesis on 6/13/2025 with removal of 1670 cc, negative for SBP  Studies not consistent with SBP however patient with increasing serum creatinine  Began on 25% albumin 25 g x3 doses due to worsening creatinine; diuretics currently on hold  Recommend nephrology consultation; will hold on midodrine/octreotide  Monitor and trend serum creatinine and electrolytes daily, monitor urine output    # Coffee Ground Emesis and Melena:  Status post EGD 6/13/2025 with evidence of grade D esophagitis; history of gastric dieulafoy lesion as well 2/2025  Continue on Protonix 40 mg twice daily by mouth  Continue on ceftriaxone x 5 days for SBP prophylaxis in setting of GI bleed and severe alcohol hepatitis  Monitor and trend hemoglobin, transfuse for goal greater than 7  Alert GI team of any concern for recurrent GI bleeding

## 2025-06-15 NOTE — ASSESSMENT & PLAN NOTE
Lab Results   Component Value Date    HGBA1C 9.6 (H) 05/26/2025       Recent Labs     06/14/25  2049 06/15/25  0722 06/15/25  1050 06/15/25  1546   POCGLU 291* 198* 120 146*       Blood Sugar Average: Last 72 hrs:  (P) [id number]  HHNK on his previous admission due to noncompliance and alcohol abuse  Home regimen is 70/30, 8 units twice daily, which was held due to coffee-ground emesis  Restarted 70/30 at 8 units twice daily on 6/14

## 2025-06-15 NOTE — ASSESSMENT & PLAN NOTE
Lab Results   Component Value Date    EGFR 21 06/15/2025    EGFR 24 06/14/2025    EGFR 28 06/13/2025    CREATININE 3.05 (H) 06/15/2025    CREATININE 2.81 (H) 06/14/2025    CREATININE 2.42 (H) 06/13/2025   Seen by nephrology on his previous admission, last seen on 6/6/2025  Per nephro: Baseline creatinine has been from 1.5-2.0 but on last admission his creatinine plateaued at 2.8-3.0 which may represent his new baseline due to his previous advanced CKD and recent ZENAIDA  He is high risk for dialysis in the near future  Creatinine today 3.05  Discussed with GI, will start albumin 25% 25 g every 8 hours x 3  Not on diuretics  If kidney function worsens consider nephrology consult tomorrow  Recheck labs tomorrow

## 2025-06-15 NOTE — PROGRESS NOTES
Progress Note - Gastroenterology   Name: Wes Araujo 55 y.o. male I MRN: [id number]  Unit/Bed#: [place] [address] -01 I Date of Admission: 6/12/2025   Date of Service: 6/15/2025 I Hospital Day: 3     Assessment & Plan  Alcoholic hepatitis with ascites  Alcohol abuse  Coffee ground emesis  Melena  Wes Araujo is a 55-year-old male with a past medical history of hypertension, CKD, diabetes mellitus type 2, chronic pancreatitis secondary to chronic alcohol use, and recent admission for alc hep with ZENAIDA on CKD who presented to [facility] on 6/12 for concern of recurrent abdominal distension with associated pain.   Labs on presentation significant for continued improvement is AST/ALT/ALP since last admission, however, bilirubin continues to increase.  INR 1.18 on presentation (peaked last admission at 5.53 with dramatic improvement after Vitamin K). Kidney function appears to have improved from discharge and remains stable at this time.     Patient previously underwent serologic liver workup negative for alternative cause of liver injury but elevations thought to be 2/2 alc hep in setting of some degree of underlying fibrosis.  Patient was treated with conservative management and no steroids due to low Madrey's score.  Patient was recommended to follow-up with gastroenterology but now her presenting with recurrent abdominal distention/pain along with coffee-ground emesis and melena.  Patient now status post EGD which revealed grade D esophagitis.    Maddrey's Discriminant Function - Control Prothrombin 13: 27.51 at 6/14/2025  4:47 AM  Calculated from:  Total Bilirubin: 12.79 mg/dL at 6/14/2025  4:47 AM  Prothrombin Time: 16.2 seconds at 6/13/2025  5:53 AM  MDF = (4.6 * (PT - Control PT)) + Bilirubin     # Elevated Liver Chemistries - Alcohol hepatitis:   Mild downtrend in LFTs today: AST/ALT 35/26, , and total bilirubin 12.88  Monitor and trend LFTs daily along with INR; no indication for prednisolone  Avoid hepatotoxic  medications, strongly encourage complete alcohol cessation  Consider liver biopsy outpatient given significant alkaline phosphatase elevation prior to admission  Recommend complete alcohol cessation moving forward  Additional pain and symptom management per primary team    # Ascites with ZNEAIDA/CKD:   Status post IR paracentesis on 6/13/2025 with removal of 1670 cc, negative for SBP  Studies not consistent with SBP however patient with increasing serum creatinine  Began on 25% albumin 25 g x3 doses due to worsening creatinine; diuretics currently on hold  Recommend nephrology consultation; will hold on midodrine/octreotide  Monitor and trend serum creatinine and electrolytes daily, monitor urine output    # Coffee Ground Emesis and Melena:  Status post EGD 6/13/2025 with evidence of grade D esophagitis; history of gastric dieulafoy lesion as well 2/2025  Continue on Protonix 40 mg twice daily by mouth  Continue on ceftriaxone x 5 days for SBP prophylaxis in setting of GI bleed and severe alcohol hepatitis  Monitor and trend hemoglobin, transfuse for goal greater than 7  Alert GI team of any concern for recurrent GI bleeding  CKD (chronic kidney disease) stage 4, GFR 15-29 ml/min ([facility])  Lab Results   Component Value Date    EGFR 21 06/15/2025    EGFR 24 06/14/2025    EGFR 28 06/13/2025    CREATININE 3.05 (H) 06/15/2025    CREATININE 2.81 (H) 06/14/2025    CREATININE 2.42 (H) 06/13/2025         Subjective  Patient seen and examined at bedside.  Sitting in bed comfortably in no acute distress or visible discomfort.  However does complain of feeling dizziness and like the room is spinning.  Denies any abdominal pain or distention.  Of appetite.  Offers no additional complaints.    Objective :  Temp:  [98 °F (36.7 °C)-99 °F (37.2 °C)] 99 °F (37.2 °C)  HR:  [70-77] 70  BP: (108-144)/(56-72) 108/62  Resp:  [18] 18  SpO2:  [94 %-97 %] 96 %  O2 Device: None (Room air)    Physical Exam  Constitutional:       General: He is not  in acute distress.     Appearance: He is well-developed.   HENT:      Head: Normocephalic and atraumatic.     Eyes:      General: Scleral icterus present.      Conjunctiva/sclera: Conjunctivae normal.       Cardiovascular:      Rate and Rhythm: Normal rate and regular rhythm.      Heart sounds: No murmur heard.  Pulmonary:      Effort: Pulmonary effort is normal. No respiratory distress.   Abdominal:      General: There is distension.      Palpations: Abdomen is soft.      Tenderness: There is no abdominal tenderness.     Musculoskeletal:         General: No swelling.      Cervical back: Neck supple.     Skin:     General: Skin is warm and dry.      Capillary Refill: Capillary refill takes less than 2 seconds.     Neurological:      Mental Status: He is alert and oriented to person, place, and time.      Comments: No asterixis   Psychiatric:         Mood and Affect: Mood normal.         Lab Results: I have reviewed the following results:CBC/BMP:   .     06/15/25  0523   WBC 12.40*   HGB 8.5*   HCT 24.2*      SODIUM 135   K 4.8      CO2 20*   BUN 38*   CREATININE 3.05*   GLUC 207*    , Creatinine Clearance: Estimated Creatinine Clearance: 25 mL/min (A) (by C-G formula based on SCr of 3.05 mg/dL (H))., LFTs:   .     06/15/25  0523   AST 35   ALT 26   ALB 3.2*   TBILI 12.88*   ALKPHOS 295*    , PTT/INR:No new results in last 24 hours.

## 2025-06-16 LAB
ALBUMIN SERPL BCG-MCNC: 3.6 G/DL (ref 3.5–5)
ALP SERPL-CCNC: 278 U/L (ref 34–104)
ALT SERPL W P-5'-P-CCNC: 23 U/L (ref 7–52)
ANION GAP SERPL CALCULATED.3IONS-SCNC: 9 MMOL/L (ref 4–13)
AST SERPL W P-5'-P-CCNC: 38 U/L (ref 13–39)
BACTERIA SPEC BFLD CULT: NO GROWTH
BILIRUB SERPL-MCNC: 12.57 MG/DL (ref 0.2–1)
BUN SERPL-MCNC: 39 MG/DL (ref 5–25)
CALCIUM SERPL-MCNC: 9.2 MG/DL (ref 8.4–10.2)
CHLORIDE SERPL-SCNC: 107 MMOL/L (ref 96–108)
CO2 SERPL-SCNC: 20 MMOL/L (ref 21–32)
CREAT SERPL-MCNC: 3.22 MG/DL (ref 0.6–1.3)
ERYTHROCYTE [DISTWIDTH] IN BLOOD BY AUTOMATED COUNT: 16.4 % (ref 11.6–15.1)
GFR SERPL CREATININE-BSD FRML MDRD: 20 ML/MIN/1.73SQ M
GLUCOSE SERPL-MCNC: 102 MG/DL (ref 65–140)
GLUCOSE SERPL-MCNC: 153 MG/DL (ref 65–140)
GLUCOSE SERPL-MCNC: 68 MG/DL (ref 65–140)
GLUCOSE SERPL-MCNC: 72 MG/DL (ref 65–140)
GLUCOSE SERPL-MCNC: 73 MG/DL (ref 65–140)
GLUCOSE SERPL-MCNC: 81 MG/DL (ref 65–140)
GRAM STN SPEC: NORMAL
GRAM STN SPEC: NORMAL
HCT VFR BLD AUTO: 25.1 % (ref 36.5–49.3)
HGB BLD-MCNC: 8.8 G/DL (ref 12–17)
INR PPP: 1.26 (ref 0.85–1.19)
MCH RBC QN AUTO: 31.8 PG (ref 26.8–34.3)
MCHC RBC AUTO-ENTMCNC: 35.1 G/DL (ref 31.4–37.4)
MCV RBC AUTO: 91 FL (ref 82–98)
PLATELET # BLD AUTO: 186 THOUSANDS/UL (ref 149–390)
PMV BLD AUTO: 12.9 FL (ref 8.9–12.7)
POTASSIUM SERPL-SCNC: 4.3 MMOL/L (ref 3.5–5.3)
PROT SERPL-MCNC: 5.4 G/DL (ref 6.4–8.4)
PROTHROMBIN TIME: 15.9 SECONDS (ref 12.3–15)
RBC # BLD AUTO: 2.77 MILLION/UL (ref 3.88–5.62)
SODIUM SERPL-SCNC: 136 MMOL/L (ref 135–147)
WBC # BLD AUTO: 12.51 THOUSAND/UL (ref 4.31–10.16)

## 2025-06-16 PROCEDURE — 85027 COMPLETE CBC AUTOMATED: CPT | Performed by: INTERNAL MEDICINE

## 2025-06-16 PROCEDURE — 99232 SBSQ HOSP IP/OBS MODERATE 35: CPT | Performed by: INTERNAL MEDICINE

## 2025-06-16 PROCEDURE — 85610 PROTHROMBIN TIME: CPT | Performed by: INTERNAL MEDICINE

## 2025-06-16 PROCEDURE — 82948 REAGENT STRIP/BLOOD GLUCOSE: CPT

## 2025-06-16 PROCEDURE — 99255 IP/OBS CONSLTJ NEW/EST HI 80: CPT | Performed by: INTERNAL MEDICINE

## 2025-06-16 PROCEDURE — 80053 COMPREHEN METABOLIC PANEL: CPT | Performed by: INTERNAL MEDICINE

## 2025-06-16 RX ORDER — ALBUMIN (HUMAN) 12.5 G/50ML
25 SOLUTION INTRAVENOUS EVERY 8 HOURS
Status: DISCONTINUED | OUTPATIENT
Start: 2025-06-16 | End: 2025-06-20

## 2025-06-16 RX ORDER — POLYETHYLENE GLYCOL 3350 17 G/17G
17 POWDER, FOR SOLUTION ORAL DAILY
Status: DISCONTINUED | OUTPATIENT
Start: 2025-06-17 | End: 2025-06-18

## 2025-06-16 RX ORDER — OCTREOTIDE ACETATE 100 UG/ML
100 INJECTION, SOLUTION INTRAVENOUS; SUBCUTANEOUS EVERY 8 HOURS SCHEDULED
Status: DISCONTINUED | OUTPATIENT
Start: 2025-06-16 | End: 2025-06-20 | Stop reason: HOSPADM

## 2025-06-16 RX ADMIN — SENNOSIDES 17.2 MG: 8.6 TABLET, FILM COATED ORAL at 21:29

## 2025-06-16 RX ADMIN — CARVEDILOL 6.25 MG: 6.25 TABLET, FILM COATED ORAL at 17:34

## 2025-06-16 RX ADMIN — SODIUM BICARBONATE 650 MG TABLET 1300 MG: at 17:34

## 2025-06-16 RX ADMIN — HYDRALAZINE HYDROCHLORIDE 25 MG: 25 TABLET ORAL at 09:17

## 2025-06-16 RX ADMIN — ACETAMINOPHEN 325 MG: 325 TABLET ORAL at 06:06

## 2025-06-16 RX ADMIN — ALBUMIN (HUMAN) 25 G: 0.25 INJECTION, SOLUTION INTRAVENOUS at 14:53

## 2025-06-16 RX ADMIN — SODIUM BICARBONATE 650 MG TABLET 1300 MG: at 09:16

## 2025-06-16 RX ADMIN — PANCRELIPASE 24000 UNITS: 120000; 24000; 76000 CAPSULE, DELAYED RELEASE PELLETS ORAL at 09:17

## 2025-06-16 RX ADMIN — AMLODIPINE BESYLATE 5 MG: 5 TABLET ORAL at 09:17

## 2025-06-16 RX ADMIN — FOLIC ACID 1 MG: 1 TABLET ORAL at 09:17

## 2025-06-16 RX ADMIN — DOCUSATE SODIUM 100 MG: 100 CAPSULE, LIQUID FILLED ORAL at 09:17

## 2025-06-16 RX ADMIN — PANTOPRAZOLE SODIUM 40 MG: 40 TABLET, DELAYED RELEASE ORAL at 17:53

## 2025-06-16 RX ADMIN — ACETAMINOPHEN 325 MG: 325 TABLET ORAL at 13:44

## 2025-06-16 RX ADMIN — THIAMINE HCL TAB 100 MG 100 MG: 100 TAB at 09:17

## 2025-06-16 RX ADMIN — ALBUMIN (HUMAN) 25 G: 0.25 INJECTION, SOLUTION INTRAVENOUS at 21:29

## 2025-06-16 RX ADMIN — AMLODIPINE BESYLATE 5 MG: 5 TABLET ORAL at 17:34

## 2025-06-16 RX ADMIN — ALBUMIN (HUMAN) 25 G: 0.25 INJECTION, SOLUTION INTRAVENOUS at 07:24

## 2025-06-16 RX ADMIN — ACETAMINOPHEN 325 MG: 325 TABLET ORAL at 23:08

## 2025-06-16 RX ADMIN — PANCRELIPASE 24000 UNITS: 120000; 24000; 76000 CAPSULE, DELAYED RELEASE PELLETS ORAL at 13:43

## 2025-06-16 RX ADMIN — PANCRELIPASE 24000 UNITS: 120000; 24000; 76000 CAPSULE, DELAYED RELEASE PELLETS ORAL at 17:53

## 2025-06-16 RX ADMIN — OCTREOTIDE ACETATE 100 MCG: 100 INJECTION, SOLUTION INTRAVENOUS; SUBCUTANEOUS at 21:29

## 2025-06-16 RX ADMIN — CARVEDILOL 6.25 MG: 6.25 TABLET, FILM COATED ORAL at 09:16

## 2025-06-16 RX ADMIN — INSULIN ASPART 8 UNITS: 100 INJECTION, SUSPENSION SUBCUTANEOUS at 13:42

## 2025-06-16 RX ADMIN — DOCUSATE SODIUM 100 MG: 100 CAPSULE, LIQUID FILLED ORAL at 17:35

## 2025-06-16 RX ADMIN — HYDRALAZINE HYDROCHLORIDE 25 MG: 25 TABLET ORAL at 01:04

## 2025-06-16 RX ADMIN — SODIUM BICARBONATE 650 MG TABLET 1300 MG: at 13:43

## 2025-06-16 RX ADMIN — PANTOPRAZOLE SODIUM 40 MG: 40 TABLET, DELAYED RELEASE ORAL at 05:53

## 2025-06-16 RX ADMIN — OCTREOTIDE ACETATE 100 MCG: 100 INJECTION, SOLUTION INTRAVENOUS; SUBCUTANEOUS at 17:51

## 2025-06-16 RX ADMIN — CEFTRIAXONE 1000 MG: 10 INJECTION, POWDER, FOR SOLUTION INTRAVENOUS at 17:34

## 2025-06-16 RX ADMIN — INSULIN LISPRO 1 UNITS: 100 INJECTION, SOLUTION INTRAVENOUS; SUBCUTANEOUS at 13:42

## 2025-06-16 NOTE — MALNUTRITION/BMI
This medical record reflects one or more clinical indicators suggestive of malnutrition and/or morbid obesity.    Malnutrition Findings:   Adult Malnutrition type: Acute illness  Adult Degree of Malnutrition: Other severe protein calorie malnutrition  Malnutrition Characteristics: Fat loss, Muscle loss, Inadequate energy                360 Statement: Severe calorie protein malnutrition in context of acute illness r/t inadequte PO intake in setting of abdominal distention/ pain as evidenced by  moderate body fat (triceps, ribcage area) and muscle mass depletions (temporal wasting, protruding clavicles) energy intake less than 50% compared to estimated needs>5 days; Treated with oral supplements    BMI Findings:           Body mass index is 21 kg/m².     See Nutrition note dated 6/16/25 for additional details.  Completed nutrition assessment is viewable in the nutrition documentation.

## 2025-06-16 NOTE — ASSESSMENT & PLAN NOTE
Lab Results   Component Value Date    EGFR 20 06/16/2025    EGFR 21 06/15/2025    EGFR 24 06/14/2025    CREATININE 3.22 (H) 06/16/2025    CREATININE 3.05 (H) 06/15/2025    CREATININE 2.81 (H) 06/14/2025   Seen by nephrology on his previous admission, last seen on 6/6/2025  Per nephro: Baseline creatinine has been from 1.5-2.0 but on last admission his creatinine plateaued at 2.8-3.0 which may represent his new baseline due to his previous advanced CKD and recent ZENAIDA  He is high risk for dialysis in the near future  Creatinine today 3.05  Discussed with GI, will start albumin 25% 25 g every 8 hours x 3  Kidney function is somewhat worse today.  Nephrology consult is greatly appreciated.  The patient has been started on octreotide.

## 2025-06-16 NOTE — CASE MANAGEMENT
Case Management Discharge Planning Note    Patient name Wes Araujo  Location [place] [address] /South 2 M* MRN [id number]  : 1970 Date 2025       Current Admission Date: 2025  Current Admission Diagnosis:Alcoholic hepatitis with ascites   Patient Active Problem List    Diagnosis Date Noted    CKD (chronic kidney disease) stage 4, GFR 15-29 ml/min (HCC) 2025    Alcohol abuse 2025    Constipation 2025    Coffee ground emesis 2025    Melena 2025    Chronic bilateral low back pain with bilateral sciatica 2025    Hyperkalemia 2025    Acute kidney injury (HCC) 2025    CKD stage 3b, GFR 30-44 ml/min (HCC) 2025    Other specified anemias 2025    Elevated LFTs 2025    British  needed 2025    Electrolyte abnormality 2025    Abnormal LFTs 2025    Abdominal pain 2025    CKD stage 3a, GFR 45-59 ml/min (HCC) 2025    Alcohol-induced chronic pancreatitis (HCC) 2024    Toxic metabolic encephalopathy 2024    Seizure (HCC) 2022    Generalized weakness 2022    Bilateral hearing loss 2020    Acute renal failure superimposed on stage 3 chronic kidney disease (HCC) 2019    Anemia 2019    Hyponatremia 2019    History of atrial fibrillation 2019    Metabolic acidosis 2019    ZENAIDA (acute kidney injury) (HCC) 2019    Primary hypertension     Type 2 diabetes mellitus with hyperglycemia, with long-term current use of insulin (HCC)     Alcoholic hepatitis with ascites     Paroxysmal A-fib (HCC)     Chronic pancreatitis (HCC)       LOS (days): 4  Geometric Mean LOS (GMLOS) (days):   Days to GMLOS:     OBJECTIVE:  Risk of Unplanned Readmission Score: 41.82         Current admission status: Inpatient   Preferred Pharmacy:    [facility] [place] [address]  Phone: [phone] Fax:  [phone]    [facility] [place] [address]  [facility],  [address]  [facility],  First Floor [facility] [place] [address]  Phone: [phone] Fax: [phone]    [id number] [place] - [address]  [address] [address]  Phone: [phone] Fax: [phone]    Primary Care Provider: Rush Kebede MD    Primary Insurance: [employer]  Secondary Insurance:     DISCHARGE DETAILS:    Additional Comments: Patient reviewed during care coordination rounds, pt being followed by Nephrology for worsening renal function. GI consulted for possible paracentesis. Plan is for home with [facility] once medically stable,  department to remain available for additional discharge concerns or needs.

## 2025-06-16 NOTE — CONSULTS
NEPHROLOGY HOSPITAL CONSULTATION   Wes Araujo 55 y.o. male MRN: [id number]  Unit/Bed#: [place] [address] -01 Encounter: [id number]    Assessment & Plan  CKD (chronic kidney disease) stage 4, GFR 15-29 ml/min ([facility])  Baseline creatinine is around 1.9-2.1 in April to May 2025.  During recent admission to [facility] from May to June 2025, creatinine was around 2.9-3.0 on discharge.  SCr 3.01 on 6/6/2025.  Admission creatinine 2.43 on 6/12/25.   Renal function is worsening.  SCr up to 3.22.  Etiology - prerenal azotemia vs HRS. No hydronephrosis on CT.   Check UA and urine Na.   Continue Albumin 25 gm every 8 hours.   Add Octreotide 100 mcg q8H.   Trend SCr.   Avoid nephrotoxic meds and hypotension.     Alcoholic hepatitis with ascites  GI following.  Had paracentesis on 6/13/2025 with 1670 cc drained.    Primary hypertension  BP at goal with tendency for hypotension.   Current Rx: Amlodipine 5 mg twice a day, carvedilol 6.25 mg twice a day, hydralazine 25 mg 3 times per day.  Stop hydralazine in light of occasionally low BP.    Electrolyte abnormality  Metabolic acidosis:  CO2 is 20.  Continue sodium bicarbonate 1300 mg twice a day.    Anemia  Hemoglobin stable.  Defer to primary service.    Coffee ground emesis  S/p EGD on 6/13/25 with esophagitis.  Currently on Protonix 40 mg BID.       TODAY's DISCUSSION/PLAN:  Renal function is worsening.   Check UA and urine Na.   Continue albumin 25 gm IV q8H.   Start Octreotide 100 mcg SC q8H.   Trend SCr.   Stop Hydralazine.     HISTORY OF PRESENT ILLNESS:  Requesting Physician: Josh Cain MD  Reason for Consult: ZENAIDA    Wes Araujo is a 55 y.o. male who was admitted to [facility] on 6/12/25 after presenting with abdominal distention. A renal consultation is requested today for assistance in the management of ZENAIDA.    Wes has a history of alcoholic liver disease, pancreatitis, diabetes mellitus, PAF.  He now presents to [facility] on 6/12/2025 after  complaining of worsening abdominal distention and back pain.  On admission, his creatinine was noted to be 2.43 and has gradually risen to 3.22 today prompting renal consultation.  He is upset because he feels that his back pain is not adequately controlled.  He reports abdominal pain and abdominal distention.  He denied any fever, chills, chest pain, shortness of breath, leg swelling.     He was recently admitted to [facility] between May 23 and June 6, 2025.  During that admission, he was seen by the nephrology service and was felt to have ATN vs HRS.  His creatinine on admission was 1.99 and it was up to 3.01 on discharge on 6/6/2025. Based on my review of the records, I note that his creatinine was 1.5-1.9 in February 2025. In April 2025, his creatinine had gone up to around 2.1 on discharge.    PAST MEDICAL HISTORY:  Past Medical History[1]    PAST SURGICAL HISTORY:  Past Surgical History[2]    ALLERGIES:  Allergies[3]    SOCIAL HISTORY:  Social History     Substance and Sexual Activity   Alcohol Use Yes     Social History     Substance and Sexual Activity   Drug Use Not Currently    Types: Cocaine     Tobacco Use History[4]    FAMILY HISTORY:  Family History[5]    MEDICATIONS:  Current Medications[6]    REVIEW OF SYSTEMS:  All the systems were reviewed and were negative except as documented on the HPI.    PHYSICAL EXAM:  Current Weight: Weight - Scale: 64.5 kg (142 lb 3.2 oz)  First Weight: Weight - Scale: 64.3 kg (141 lb 12.1 oz)  Vitals:    06/15/25 2103 06/16/25 0104 06/16/25 0727 06/16/25 1220   BP: 140/59 141/72 147/68 122/61   BP Location:   Right arm    Pulse: 76  75 73   Resp: 18  18    Temp: 98.7 °F (37.1 °C)  97.9 °F (36.6 °C) 97.6 °F (36.4 °C)   TempSrc: Oral  Oral    SpO2: 95%  95% 97%   Weight:       Height:           Intake/Output Summary (Last 24 hours) at 6/16/2025 1330  Last data filed at 6/16/2025 0922  Gross per 24 hour   Intake 650 ml   Output --   Net 650 ml     Physical  "Exam  General: conscious, coherent, not in distress.   Skin: warm, dry, good turgor.   Eyes: pink conjunctivae, + scleral icterus.   ENT: moist lips and mucous membranes.   Respiratory: equal chest expansion, clear breath sounds.   Cardiovascular: distinct heart sounds, normal rate, regular rhythm, no rub  Abdomen: soft, non-tender, distended, normoactive bowel sounds  Extremities: no LE edema.  Genitourinary: no ibrahim catheter.   Neuro: awake, alert, oriented to time, place and person.   Psych: agitated     Lab Results:   Results from last 7 days   Lab Units 06/16/25  0506 06/15/25  0523 06/14/25  0447   WBC Thousand/uL 12.51* 12.40* 16.16*   HEMOGLOBIN g/dL 8.8* 8.5* 6.9*   HEMATOCRIT % 25.1* 24.2* 19.7*   PLATELETS Thousands/uL 186 178 198   POTASSIUM mmol/L 4.3 4.8 5.1   CHLORIDE mmol/L 107 107 108   CO2 mmol/L 20* 20* 19*   BUN mg/dL 39* 38* 37*   CREATININE mg/dL 3.22* 3.05* 2.81*   CALCIUM mg/dL 9.2 8.7 9.0   ALK PHOS U/L 278* 295* 292*   ALT U/L 23 26 27   AST U/L 38 35 36     Other Studies:   CT of the chest, abdomen, and pelvis was personally reviewed by me in PACS and showed no hydronephrosis.    Portions of the record may have been created with voice recognition software. Occasional wrong word or \"sound a like\" substitutions may have occurred due to the inherent limitations of voice recognition software. Read the chart carefully and recognize, using context, where substitutions have occurred.If you have any questions, please contact the dictating provider.       [1]   Past Medical History:  Diagnosis Date    Alcohol abuse     Chronic pancreatitis (HCC)     Diabetes mellitus (HCC)     Type 2    Non compliance w medication regimen     Pancreatitis     Paroxysmal A-fib (HCC)     Thrombocytopenia (HCC)    [2]   Past Surgical History:  Procedure Laterality Date    INCISION AND DRAINAGE OF WOUND N/A 8/16/2020    Procedure: INCISION AND DRAINAGE (I&D) HEAD/FACE;  Surgeon: Estrada Walton DMD;  Location: [facility] " OR;  Service: Maxillofacial    IR BIOPSY BONE MARROW  2/7/2019    IR PARACENTESIS  6/3/2025    IR PARACENTESIS  6/13/2025    REMOVAL OF IMPACTED TOOTH - COMPLETELY BONY N/A 8/16/2020    Procedure: EXTRACTION TEETH MULTIPLE #2, 3;  Surgeon: Estrada Walton DMD;  Location: [facility];  Service: Maxillofacial   [3] No Known Allergies  [4]   Social History  Tobacco Use   Smoking Status Unknown    Passive exposure: Past   Smokeless Tobacco Not on file   [5]   Family History  Problem Relation Name Age of Onset    Diabetes Mother      Hypertension Mother      Hyperlipidemia Father     [6]   Current Facility-Administered Medications:     acetaminophen (TYLENOL) tablet 325 mg, 325 mg, Oral, Q8H PRN, ARIANE Moore, 325 mg at 06/16/25 0606    amLODIPine (NORVASC) tablet 5 mg, 5 mg, Oral, BID, Michael Jewell MD, 5 mg at 06/16/25 0917    bisacodyl (DULCOLAX) rectal suppository 10 mg, 10 mg, Rectal, Daily PRN, Jazmín Buchanan MD    carvedilol (COREG) tablet 6.25 mg, 6.25 mg, Oral, BID With Meals, Juan Ramon Manley PA-C, 6.25 mg at 06/16/25 0916    cefTRIAXone (ROCEPHIN) 1,000 mg in dextrose 5 % 50 mL IVPB, 1,000 mg, Intravenous, Q24H, Gwen Boone DO, Last Rate: 100 mL/hr at 06/15/25 1600, 1,000 mg at 06/15/25 1600    docusate sodium (COLACE) capsule 100 mg, 100 mg, Oral, BID, Michael Jewell MD, 100 mg at 06/16/25 0917    folic acid (FOLVITE) tablet 1 mg, 1 mg, Oral, Daily, Michael Jewell MD, 1 mg at 06/16/25 0917    hydrALAZINE (APRESOLINE) tablet 25 mg, 25 mg, Oral, Q8H JAISON, Michael Jewell MD, 25 mg at 06/16/25 0917    insulin aspart protamine-insulin aspart (NovoLOG 70/30) 100 units/mL subcutaneous injection 8 Units, 8 Units, Subcutaneous, BID AC, Naa Quiles MD, 8 Units at 06/15/25 1721    insulin lispro (HumALOG/ADMELOG) 100 units/mL subcutaneous injection 1-5 Units, 1-5 Units, Subcutaneous, TID AC, 1 Units at 06/15/25 0835 **AND** Fingerstick Glucose  (POCT), , , TID AC, Juan Ramon Manley PA-C    insulin lispro (HumALOG/ADMELOG) 100 units/mL subcutaneous injection 1-5 Units, 1-5 Units, Subcutaneous, HS, Juan Ramon Manley PA-C, 3 Units at 06/14/25 2141    [Held by provider] multivitamin-minerals (CENTRUM) tablet 1 tablet, 1 tablet, Oral, Daily, Juan Ramon Manley PA-C    pancrelipase (Lip-Prot-Amyl) (CREON) delayed release capsule 24,000 Units, 24,000 Units, Oral, TID With Meals, Michael Jewell MD, 24,000 Units at 06/16/25 0917    pantoprazole (PROTONIX) EC tablet 40 mg, 40 mg, Oral, BID AC, Naa Quiles MD, 40 mg at 06/16/25 0553    polyethylene glycol (MIRALAX) packet 17 g, 17 g, Oral, Daily PRN, Juan Ramon Manley PA-C    senna (SENOKOT) tablet 17.2 mg, 2 tablet, Oral, HS, Michael Jewell MD, 17.2 mg at 06/15/25 2127    sodium bicarbonate tablet 1,300 mg, 1,300 mg, Oral, TID after meals, Juan Ramon Manley PA-C, 1,300 mg at 06/16/25 0916    thiamine tablet 100 mg, 100 mg, Oral, Daily, Michael Jewell MD, 100 mg at 06/16/25 0917    trimethobenzamide (TIGAN) IM injection 200 mg, 200 mg, Intramuscular, Q6H PRN, Juan Ramon Manley PA-C, 200 mg at 06/13/25 0717

## 2025-06-16 NOTE — ASSESSMENT & PLAN NOTE
55-year-old male with a past medical history of hypertension, CKD, diabetes mellitus type 2, chronic pancreatitis secondary to chronic alcohol use, and recent admission for alc hep with ZENAIDA on CKD who presented to [facility] on 6/12 for concern of recurrent abdominal distension with associated pain.   Labs on presentation significant for continued improvement in AST/ALT/ALP since last admission, however, bilirubin continues to increase.  INR 1.18 on presentation (peaked last admission at 5.53 with dramatic improvement after Vitamin K). Kidney function appeared to be improving from previous admission at which time creatinine peaked at 3.01, but is now worsening rising to 3.22 today.    Patient previously underwent serologic liver workup negative for alternative cause of liver injury with elevations thought to be 2/2 alc hep in setting of some degree of underlying fibrosis.  Patient was treated with conservative management without steroids due to low Madrey's score.  Patient was recommended to follow-up with gastroenterology but now presenting with recurrent abdominal distention/pain along with coffee-ground emesis and melena on admission leading to EGD without evidence of varices, but showing grade D esophagitis.    Maddrey's Discriminant Function - Control Prothrombin 13: 25.91 at 6/16/2025  5:06 AM  Calculated from:  Total Bilirubin: 12.57 mg/dL at 6/16/2025  5:06 AM  Prothrombin Time: 15.9 seconds at 6/16/2025  5:06 AM  MDF = (4.6 * (PT - Control PT)) + Bilirubin     # Elevated Liver Chemistries - Alcohol hepatitis:   Mild downtrend in LFTs today: AST/ALT 35/26, , and total bilirubin 12.88  Monitor and trend LFTs daily along with INR; no indication for prednisolone  Avoid hepatotoxic medications, strongly encourage complete alcohol cessation  Consider liver biopsy outpatient given significant alkaline phosphatase elevation prior to admission  Recommend complete alcohol cessation moving forward  Additional pain and  symptom management per primary team    # Ascites with ZENAIDA/CKD:   Status post IR paracentesis on 6/13/2025 with removal of 1670 cc, negative for SBP  Studies not consistent with SBP however patient with increasing serum creatinine  Began on 25% albumin 25 g x3 doses due to worsening creatinine; diuretics currently on hold  Recommend nephrology consultation; will hold on midodrine/octreotide  Monitor and trend serum creatinine and electrolytes daily, monitor urine output    # Coffee Ground Emesis and Melena:  Status post EGD 6/13/2025 with evidence of grade D esophagitis; history of gastric dieulafoy lesion as well 2/2025  Continue on Protonix 40 mg twice daily by mouth  Continue on ceftriaxone x 5 days for SBP prophylaxis in setting of GI bleed and severe alcohol hepatitis  Monitor and trend hemoglobin, transfuse for goal greater than 7  Alert GI team of any concern for recurrent GI bleeding

## 2025-06-16 NOTE — ASSESSMENT & PLAN NOTE
55-year-old male with a past medical history of hypertension, CKD, diabetes mellitus type 2, chronic pancreatitis secondary to chronic alcohol use, and recent admission for alc hep with ZENAIDA on CKD who presented to [facility] on 6/12 for concern of recurrent abdominal distension with associated pain.   Labs on presentation significant for continued improvement in AST/ALT/ALP since last admission, however, bilirubin continues to increase.  INR 1.18 on presentation (peaked last admission at 5.53 with dramatic improvement after Vitamin K). Kidney function appeared to be improving from previous admission at which time creatinine peaked at 3.01, but is now worsening rising to 3.22 today.    Patient previously underwent serologic liver workup negative for alternative cause of liver injury with elevations thought to be 2/2 alc hep in setting of some degree of underlying fibrosis.  Patient was treated with conservative management without steroids due to low Madrey's score.  Patient was recommended to follow-up with gastroenterology but now presenting with recurrent abdominal distention/pain along with coffee-ground emesis and melena on admission leading to EGD without evidence of varices, but showing grade D esophagitis.    Maddrey's Discriminant Function - Control Prothrombin 13: 25.91 at 6/16/2025  5:06 AM  Calculated from:  Total Bilirubin: 12.57 mg/dL at 6/16/2025  5:06 AM  Prothrombin Time: 15.9 seconds at 6/16/2025  5:06 AM  MDF = (4.6 * (PT - Control PT)) + Bilirubin     # Elevated Liver Chemistries - Alcohol hepatitis:   Mild downtrend in LFTs today: AST/ALT 35/26, , and total bilirubin 12.88  Monitor and trend LFTs daily along with INR; no indication for prednisolone  Avoid hepatotoxic medications, strongly encourage complete alcohol cessation  Consider liver biopsy outpatient given significant alkaline phosphatase elevation prior to admission  Recommend complete alcohol cessation moving forward  Additional pain and  symptom management per primary team    # Ascites with ZENAIDA/CKD:   Status post IR paracentesis on 6/13/2025 with removal of 1670 cc, negative for SBP  Studies not consistent with SBP however patient with increasing serum creatinine  Began on 25% albumin 25 g x3 doses due to worsening creatinine; diuretics currently on hold  Recommend nephrology consultation; will hold on midodrine/octreotide  Monitor and trend serum creatinine and electrolytes daily, monitor urine output    # Coffee Ground Emesis and Melena:  Status post EGD 6/13/2025 with evidence of grade D esophagitis; history of gastric dieulafoy lesion as well 2/2025  Continue on Protonix 40 mg twice daily by mouth  Continue on ceftriaxone x 5 days for SBP prophylaxis in setting of GI bleed and severe alcohol hepatitis  Monitor and trend hemoglobin, transfuse for goal greater than 7  Alert GI team of any concern for recurrent GI bleeding

## 2025-06-16 NOTE — ASSESSMENT & PLAN NOTE
55-year-old male with a past medical history of hypertension, CKD, diabetes mellitus type 2, chronic pancreatitis secondary to chronic alcohol use, and recent admission for alc hep with ZENAIDA on CKD who presented to [facility] on 6/12 for concern of recurrent abdominal distension with associated pain.   Labs on presentation significant for continued improvement in AST/ALT/ALP since last admission, however, bilirubin continues to increase.  INR 1.18 on presentation (peaked last admission at 5.53 with dramatic improvement after Vitamin K). Kidney function appeared to be improving from previous admission at which time creatinine peaked at 3.01, but is now worsening rising to 3.22 today.    Patient previously underwent serologic liver workup negative for alternative cause of liver injury with elevations thought to be 2/2 alc hep in setting of some degree of underlying fibrosis.  Patient was treated with conservative management without steroids due to low Madrey's score.  Patient was recommended to follow-up with gastroenterology but now presenting with recurrent abdominal distention/pain along with coffee-ground emesis and melena on admission leading to EGD without evidence of varices, but showing grade D esophagitis.    Maddrey's Discriminant Function - Control Prothrombin 13: 25.91 at 6/16/2025  5:06 AM  Calculated from:  Total Bilirubin: 12.57 mg/dL at 6/16/2025  5:06 AM  Prothrombin Time: 15.9 seconds at 6/16/2025  5:06 AM  MDF = (4.6 * (PT - Control PT)) + Bilirubin     # Elevated Liver Chemistries - Alcohol hepatitis:   Mild downtrend in LFTs today: AST/ALT 35/26, , and total bilirubin 12.88  Monitor and trend LFTs daily along with INR; no indication for prednisolone  Avoid hepatotoxic medications, strongly encourage complete alcohol cessation  Consider liver biopsy outpatient given significant alkaline phosphatase elevation prior to admission  Recommend complete alcohol cessation moving forward  Additional pain and  symptom management per primary team    # Ascites with ZENAIDA/CKD:   Status post IR paracentesis on 6/13/2025 with removal of 1670 cc, negative for SBP  Studies not consistent with SBP however patient with increasing serum creatinine  Began on 25% albumin 25 g x3 doses due to worsening creatinine; diuretics currently on hold  Recommend nephrology consultation; will hold on midodrine/octreotide  Monitor and trend serum creatinine and electrolytes daily, monitor urine output    # Coffee Ground Emesis and Melena:  Status post EGD 6/13/2025 with evidence of grade D esophagitis; history of gastric dieulafoy lesion as well 2/2025  Continue on Protonix 40 mg twice daily by mouth  Continue on ceftriaxone x 5 days for SBP prophylaxis in setting of GI bleed and severe alcohol hepatitis  Monitor and trend hemoglobin, transfuse for goal greater than 7  Alert GI team of any concern for recurrent GI bleeding

## 2025-06-16 NOTE — ASSESSMENT & PLAN NOTE
Lab Results   Component Value Date    HGBA1C 9.6 (H) 05/26/2025       Recent Labs     06/15/25  1546 06/16/25  0725 06/16/25  1120 06/16/25  1617   POCGLU 146* 102 153* 72       Blood Sugar Average: Last 72 hrs:  (P) 208.6875  HHNK on his previous admission due to noncompliance and alcohol abuse  Home regimen is 70/30, 8 units twice daily, which was held due to coffee-ground emesis  Restarted 70/30 at 8 units twice daily on 6/14

## 2025-06-16 NOTE — ASSESSMENT & PLAN NOTE
Lab Results   Component Value Date    EGFR 20 06/16/2025    EGFR 21 06/15/2025    EGFR 24 06/14/2025    CREATININE 3.22 (H) 06/16/2025    CREATININE 3.05 (H) 06/15/2025    CREATININE 2.81 (H) 06/14/2025

## 2025-06-16 NOTE — PROGRESS NOTES
Progress Note - Gastroenterology   Name: Wes Araujo 55 y.o. male I MRN: [id number]  Unit/Bed#: [place] [address] -01 I Date of Admission: 6/12/2025   Date of Service: 6/16/2025 I Hospital Day: 4    Assessment & Plan  Alcoholic hepatitis with ascites  Alcohol abuse  Coffee ground emesis  Melena  55-year-old male with a past medical history of hypertension, CKD, diabetes mellitus type 2, chronic pancreatitis secondary to chronic alcohol use, and recent admission for alc hep with ZENAIDA on CKD who presented to [facility] on 6/12 for concern of recurrent abdominal distension with associated pain.   Labs on presentation significant for continued improvement in AST/ALT/ALP since last admission, however, bilirubin continues to increase.  INR 1.18 on presentation (peaked last admission at 5.53 with dramatic improvement after Vitamin K). Kidney function appeared to be improving from previous admission at which time creatinine peaked at 3.01, but is now worsening rising to 3.22 today.    Patient previously underwent serologic liver workup negative for alternative cause of liver injury with elevations thought to be 2/2 alc hep in setting of some degree of underlying fibrosis.  Patient was treated with conservative management without steroids due to low Madrey's score.  Patient was recommended to follow-up with gastroenterology but now presenting with recurrent abdominal distention/pain along with coffee-ground emesis and melena on admission leading to EGD without evidence of varices, but showing grade D esophagitis.    Pamela's Discriminant Function - Control Prothrombin 13: 25.91 at 6/16/2025  5:06 AM  Calculated from:  Total Bilirubin: 12.57 mg/dL at 6/16/2025  5:06 AM  Prothrombin Time: 15.9 seconds at 6/16/2025  5:06 AM  MDF = (4.6 * (PT - Control PT)) + Bilirubin     # Elevated Liver Chemistries - Alcohol hepatitis:   Mild downtrend in LFTs today: AST/ALT 35/26, , and total bilirubin 12.88  Monitor and trend LFTs daily along  with INR; no indication for prednisolone  Avoid hepatotoxic medications, strongly encourage complete alcohol cessation  Consider liver biopsy outpatient given significant alkaline phosphatase elevation prior to admission  Recommend complete alcohol cessation moving forward  Additional pain and symptom management per primary team    # Ascites with ZENAIDA/CKD:   Status post IR paracentesis on 6/13/2025 with removal of 1670 cc, negative for SBP  Studies not consistent with SBP however patient with increasing serum creatinine  Began on 25% albumin 25 g x3 doses due to worsening creatinine; diuretics currently on hold  Recommend nephrology consultation; will hold on midodrine/octreotide  Monitor and trend serum creatinine and electrolytes daily, monitor urine output    # Coffee Ground Emesis and Melena:  Status post EGD 6/13/2025 with evidence of grade D esophagitis; history of gastric dieulafoy lesion as well 2/2025  Continue on Protonix 40 mg twice daily by mouth  Continue on ceftriaxone x 5 days for SBP prophylaxis in setting of GI bleed and severe alcohol hepatitis  Monitor and trend hemoglobin, transfuse for goal greater than 7  Alert GI team of any concern for recurrent GI bleeding  CKD (chronic kidney disease) stage 4, GFR 15-29 ml/min ([facility])  Lab Results   Component Value Date    EGFR 20 06/16/2025    EGFR 21 06/15/2025    EGFR 24 06/14/2025    CREATININE 3.22 (H) 06/16/2025    CREATININE 3.05 (H) 06/15/2025    CREATININE 2.81 (H) 06/14/2025     Electrolyte abnormality      I have discussed the above management plan in detail with the primary service.     Subjective   Patient with continued abdominal discomfort.  Denies nausea, vomiting.  Discussed with assistance of .    Objective :  Temp:  [97.6 °F (36.4 °C)-98.8 °F (37.1 °C)] 98.8 °F (37.1 °C)  HR:  [73-76] 73  BP: (105-147)/(55-78) 132/78  Resp:  [18] 18  SpO2:  [95 %-98 %] 97 %  O2 Device: None (Room air)    Physical Exam  Vitals and nursing  note reviewed.   Constitutional:       Appearance: He is well-developed and normal weight.   HENT:      Head: Atraumatic.     Eyes:      Conjunctiva/sclera: Conjunctivae normal.     Abdominal:      General: There is no distension.      Palpations: Abdomen is soft.      Tenderness: There is no abdominal tenderness. There is no guarding or rebound.     Skin:     General: Skin is warm and dry.      Capillary Refill: Capillary refill takes less than 2 seconds.     Psychiatric:         Mood and Affect: Mood normal.         Lab Results: I have reviewed the following results:none    Imaging Results Review: No pertinent imaging studies reviewed.  Other Study Results Review: No additional pertinent studies reviewed.    Administrative Statements   I have spent a total time of 32 minutes in caring for this patient on the day of the visit/encounter including Diagnostic results, Prognosis, Risks and benefits of tx options, Instructions for management, and Patient and family education.

## 2025-06-16 NOTE — PROGRESS NOTES
Progress Note - Hospitalist   Name: Wes Araujo 55 y.o. male I MRN: [id number]  Unit/Bed#: [place] [address] -01 I Date of Admission: 6/12/2025   Date of Service: 6/16/2025 I Hospital Day: 4    Assessment & Plan  Alcoholic hepatitis with ascites  Patient is a 55-year-old male with a past medical history of alcohol abuse, alcoholic liver cirrhosis, anemia, hypertension, type 2 diabetes, history of atrial fibrillation, chronic pancreatitis who presented to the hospital with abdominal pain and distention.  Overnight on the day of admission developed coffee-ground emesis and melena  Patient was admitted from 5/23/2025 to 6/6/2025 secondary to toxic metabolic encephalopathy secondary to hyperglycemia, HHNK, kidney injury and acute liver failure.  Received insulin, IV fluids, seen by gastroenterology, DF did not meet criteria for steroids, infection was ruled out, initial plan was for patient to be discharged to inpatient alcoholic rehab but patient refused and he was discharged home    6/12/2025 CT showed large volume ascites, increased since 6/3/2025   6/13/2025 underwent paracentesis 1670 cc clear fluid removed, no evidence of SBP, fluid cultures are pending  6/13/2025 underwent EGD which showed esophagitis.  No esophageal or gastric varices.  Ceftriaxone for 5 days for SBP prophylaxis in the setting of GI bleed  No indication for prednisolone  Due to slight worsening of kidney functions start albumin 25% 25 mg every 8 hours x 3  Followed by GI  Coffee ground emesis  Night of admission 6/12/2025 developed coffee-ground emesis and melena  6/13/2025 underwent EGD which showed esophagitis, no esophageal or gastric varices.  Rest of the exam normal  Per GI suspect coffee-ground emesis in the setting of severe esophagitis  6/14 for hemoglobin of 6.9 received a unit of blood  Hemoglobin today 8.5  No signs of GI bleed  Continue Protonix 40 mg twice daily  Transfuse if hemoglobin less than 7  Melena  Secondary to severe  esophagitis  See plan above  Anemia  Recent hemoglobin in the range of 7-9  Patient presented with abdominal pain, had coffee-ground emesis and melena on this admission  Underwent EGD which showed severe esophagitis most likely the cause of the coffee-ground emesis and melena  Received a unit of blood on 6/14  Hemoglobin stable today at 8.5  CKD (chronic kidney disease) stage 4, GFR 15-29 ml/min ([facility])  Lab Results   Component Value Date    EGFR 20 06/16/2025    EGFR 21 06/15/2025    EGFR 24 06/14/2025    CREATININE 3.22 (H) 06/16/2025    CREATININE 3.05 (H) 06/15/2025    CREATININE 2.81 (H) 06/14/2025   Seen by nephrology on his previous admission, last seen on 6/6/2025  Per nephro: Baseline creatinine has been from 1.5-2.0 but on last admission his creatinine plateaued at 2.8-3.0 which may represent his new baseline due to his previous advanced CKD and recent ZENAIDA  He is high risk for dialysis in the near future  Creatinine today 3.05  Discussed with GI, will start albumin 25% 25 g every 8 hours x 3  Kidney function is somewhat worse today.  Nephrology consult is greatly appreciated.  The patient has been started on octreotide.  Alcohol abuse  Pt with history of alcohol abuse with withdrawal. He states he typically drinks multiple beer a day, but states he has not been drinking since he was discharged from the hospital last week  ETOH <10  Monitor on CIWA for now  Vitamin supplementation  Encourage continued cessation  Type 2 diabetes mellitus with hyperglycemia, with long-term current use of insulin ([facility])  Lab Results   Component Value Date    HGBA1C 9.6 (H) 05/26/2025       Recent Labs     06/15/25  1546 06/16/25  0725 06/16/25  1120 06/16/25  1617   POCGLU 146* 102 153* 72       Blood Sugar Average: Last 72 hrs:  (P) 208.6875  HHNK on his previous admission due to noncompliance and alcohol abuse  Home regimen is 70/30, 8 units twice daily, which was held due to coffee-ground emesis  Restarted 70/30 at 8 units  twice daily on 6/14  Generalized weakness  Pt reports that since discharge he has been having difficulty with ambulation  Typically he ambulates without assistive devices, but he reports he has been struggling  PT/OT eval  Primary hypertension  Continue amlodipine 5 mg twice daily, hydralazine 25 mg 3 times daily, and Coreg 6.25 twice daily with holding parameters  Chronic pancreatitis (HCC)  Patient with a history of chronic pancreatitis secondary to alcohol   Chronic pancreatitis stable on CT imaging on admission    History of atrial fibrillation  History of atrial fibrillation, continue Coreg  Not on anticoagulation at baseline  Constipation  Pt reports no bowel movement for 6 days  Bowel regimen initiated        Subjective:  The patient does not feel very well.  He has mild abdominal discomfort and a feeling of fullness.  He has had no nausea or vomiting.  He has no chest pain or shortness of breath.    Physical Exam:   Temp:  [97.6 °F (36.4 °C)-98.8 °F (37.1 °C)] 98.8 °F (37.1 °C)  HR:  [73-76] 73  BP: (105-147)/(55-78) 132/78  Resp:  [18] 18  SpO2:  [95 %-98 %] 97 %  O2 Device: None (Room air)    Gen: Well-developed, frail, in no acute distress.  Neck: Supple.  No lymphadenopathy or goiter.  Heart: Regular rhythm.  I heard no murmur, gallop, or rub.  Lungs: Diminished breath sounds at the bases.  I heard no wheezing, rales, or rhonchi.  Abd: Soft but somewhat distended.  Mild diffuse tenderness.  No mass or rebound.  Extremities: No clubbing, cyanosis, or edema.  No calf tenderness.  Neuro: Alert and oriented.  No focal sign.  Skin: Warm and dry.      VTE Pharmacologic Prophylaxis: Sequential compression device (Venodyne)   VTE Mechanical Prophylaxis: sequential compression device

## 2025-06-16 NOTE — ASSESSMENT & PLAN NOTE
Baseline creatinine is around 1.9-2.1 in April to May 2025.  During recent admission to [facility] from May to June 2025, creatinine was around 2.9-3.0 on discharge.  SCr 3.01 on 6/6/2025.  Admission creatinine 2.43 on 6/12/25.   Renal function is worsening.  SCr up to 3.22.  Etiology - prerenal azotemia vs HRS. No hydronephrosis on CT.   Check UA and urine Na.   Continue Albumin 25 gm every 8 hours.   Add Octreotide 100 mcg q8H.   Trend SCr.   Avoid nephrotoxic meds and hypotension.

## 2025-06-16 NOTE — ASSESSMENT & PLAN NOTE
55-year-old male with a past medical history of hypertension, CKD, diabetes mellitus type 2, chronic pancreatitis secondary to chronic alcohol use, and recent admission for alc hep with ZENAIDA on CKD who presented to [facility] on 6/12 for concern of recurrent abdominal distension with associated pain.   Labs on presentation significant for continued improvement in AST/ALT/ALP since last admission, however, bilirubin continues to increase.  INR 1.18 on presentation (peaked last admission at 5.53 with dramatic improvement after Vitamin K). Kidney function appeared to be improving from previous admission at which time creatinine peaked at 3.01, but is now worsening rising to 3.22 today.    Patient previously underwent serologic liver workup negative for alternative cause of liver injury with elevations thought to be 2/2 alc hep in setting of some degree of underlying fibrosis.  Patient was treated with conservative management without steroids due to low Madrey's score.  Patient was recommended to follow-up with gastroenterology but now presenting with recurrent abdominal distention/pain along with coffee-ground emesis and melena on admission leading to EGD without evidence of varices, but showing grade D esophagitis.    Maddrey's Discriminant Function - Control Prothrombin 13: 25.91 at 6/16/2025  5:06 AM  Calculated from:  Total Bilirubin: 12.57 mg/dL at 6/16/2025  5:06 AM  Prothrombin Time: 15.9 seconds at 6/16/2025  5:06 AM  MDF = (4.6 * (PT - Control PT)) + Bilirubin     # Elevated Liver Chemistries - Alcohol hepatitis:   Mild downtrend in LFTs today: AST/ALT 35/26, , and total bilirubin 12.88  Monitor and trend LFTs daily along with INR; no indication for prednisolone  Avoid hepatotoxic medications, strongly encourage complete alcohol cessation  Consider liver biopsy outpatient given significant alkaline phosphatase elevation prior to admission  Recommend complete alcohol cessation moving forward  Additional pain and  symptom management per primary team    # Ascites with ZENAIDA/CKD:   Status post IR paracentesis on 6/13/2025 with removal of 1670 cc, negative for SBP  Studies not consistent with SBP however patient with increasing serum creatinine  Began on 25% albumin 25 g x3 doses due to worsening creatinine; diuretics currently on hold  Recommend nephrology consultation; will hold on midodrine/octreotide  Monitor and trend serum creatinine and electrolytes daily, monitor urine output    # Coffee Ground Emesis and Melena:  Status post EGD 6/13/2025 with evidence of grade D esophagitis; history of gastric dieulafoy lesion as well 2/2025  Continue on Protonix 40 mg twice daily by mouth  Continue on ceftriaxone x 5 days for SBP prophylaxis in setting of GI bleed and severe alcohol hepatitis  Monitor and trend hemoglobin, transfuse for goal greater than 7  Alert GI team of any concern for recurrent GI bleeding

## 2025-06-16 NOTE — PLAN OF CARE
Problem: PAIN - ADULT  Goal: Verbalizes/displays adequate comfort level or baseline comfort level  Description: Interventions:  - Encourage patient to monitor pain and request assistance  - Assess pain using appropriate pain scale  - Administer analgesics as ordered based on type and severity of pain and evaluate response  - Implement non-pharmacological measures as appropriate and evaluate response  - Consider cultural and social influences on pain and pain management  - Notify physician/advanced practitioner if interventions unsuccessful or patient reports new pain  - Educate patient/family on pain management process including their role and importance of  reporting pain   - Provide non-pharmacologic/complimentary pain relief interventions  Outcome: Progressing     Problem: INFECTION - ADULT  Goal: Absence or prevention of progression during hospitalization  Description: INTERVENTIONS:  - Assess and monitor for signs and symptoms of infection  - Monitor lab/diagnostic results  - Monitor all insertion sites, i.e. indwelling lines, tubes, and drains  - Monitor endotracheal if appropriate and nasal secretions for changes in amount and color  - [place] appropriate cooling/warming therapies per order  - Administer medications as ordered  - Instruct and encourage patient and family to use good hand hygiene technique  - Identify and instruct in appropriate isolation precautions for identified infection/condition  Outcome: Progressing  Goal: Absence of fever/infection during neutropenic period  Description: INTERVENTIONS:  - Monitor WBC  - Perform strict hand hygiene  - Limit to healthy visitors only  - No plants, dried, fresh or silk flowers with mcclure in patient room  Outcome: Progressing     Problem: SAFETY ADULT  Goal: Patient will remain free of falls  Description: INTERVENTIONS:  - Educate patient/family on patient safety including physical limitations  - Instruct patient to call for assistance with activity   -  Consider consulting OT/PT to assist with strengthening/mobility based on AM PAC & JH-HLM score  - Consult OT/PT to assist with strengthening/mobility   - Keep Call bell within reach  - Keep bed low and locked with side rails adjusted as appropriate  - Keep care items and personal belongings within reach  - Initiate and maintain comfort rounds  - Make Fall Risk Sign visible to staff  - Offer Toileting every 2 Hours, in advance of need  - Initiate/Maintain bed alarm  - Obtain necessary fall risk management equipment: yellow socks.  - Apply yellow socks and bracelet for high fall risk patients  - Consider moving patient to room near nurses station  Outcome: Progressing  Goal: Maintain or return to baseline ADL function  Description: INTERVENTIONS:  -  Assess patient's ability to carry out ADLs; assess patient's baseline for ADL function and identify physical deficits which impact ability to perform ADLs (bathing, care of mouth/teeth, toileting, grooming, dressing, etc.)  - Assess/evaluate cause of self-care deficits   - Assess range of motion  - Assess patient's mobility; develop plan if impaired  - Assess patient's need for assistive devices and provide as appropriate  - Encourage maximum independence but intervene and supervise when necessary  - Involve family in performance of ADLs  - Assess for home care needs following discharge   - Consider OT consult to assist with ADL evaluation and planning for discharge  - Provide patient education as appropriate  - Monitor functional capacity and physical performance, use of AM PAC & JH-HLM   - Monitor gait, balance and fatigue with ambulation    Outcome: Progressing  Goal: Maintains/Returns to pre admission functional level  Description: INTERVENTIONS:  - Perform AM-PAC 6 Click Basic Mobility/ Daily Activity assessment daily.  - Set and communicate daily mobility goal to care team and patient/family/caregiver.   - Collaborate with rehabilitation services on mobility goals  if consulted  - Perform Range of Motion 3 times a day.  - Reposition patient every 2 hours.  - Dangle patient 3 times a day  - Stand patient 3 times a day  - Ambulate patient 3 times a day  - Out of bed to chair 3 times a day   - Out of bed for meals 3 times a day  - Out of bed for toileting  - Record patient progress and toleration of activity level   Outcome: Progressing     Problem: DISCHARGE PLANNING  Goal: Discharge to home or other facility with appropriate resources  Description: INTERVENTIONS:  - Identify barriers to discharge w/patient and caregiver  - Arrange for needed discharge resources and transportation as appropriate  - Identify discharge learning needs (meds, wound care, etc.)  - Arrange for interpretive services to assist at discharge as needed  - Refer to Case Management Department for coordinating discharge planning if the patient needs post-hospital services based on physician/advanced practitioner order or complex needs related to functional status, cognitive ability, or social support system  Outcome: Progressing     Problem: Knowledge Deficit  Goal: Patient/family/caregiver demonstrates understanding of disease process, treatment plan, medications, and discharge instructions  Description: Complete learning assessment and assess knowledge base.  Interventions:  - Provide teaching at level of understanding  - Provide teaching via preferred learning methods  Outcome: Progressing     Problem: GASTROINTESTINAL - ADULT  Goal: Minimal or absence of nausea and/or vomiting  Description: INTERVENTIONS:  - Administer IV fluids if ordered to ensure adequate hydration  - Maintain NPO status until nausea and vomiting are resolved  - Nasogastric tube if ordered  - Administer ordered antiemetic medications as needed  - Provide nonpharmacologic comfort measures as appropriate  - Advance diet as tolerated, if ordered  - Consider nutrition services referral to assist patient with adequate nutrition and  appropriate food choices  Outcome: Progressing  Goal: Maintains or returns to baseline bowel function  Description: INTERVENTIONS:  - Assess bowel function  - Encourage oral fluids to ensure adequate hydration  - Administer IV fluids if ordered to ensure adequate hydration  - Administer ordered medications as needed  - Encourage mobilization and activity  - Consider nutritional services referral to assist patient with adequate nutrition and appropriate food choices  Outcome: Progressing  Goal: Maintains adequate nutritional intake  Description: INTERVENTIONS:  - Monitor percentage of each meal consumed  - Identify factors contributing to decreased intake, treat as appropriate  - Assist with meals as needed  - Monitor I&O, weight, and lab values if indicated  - Obtain nutrition services referral as needed  Outcome: Progressing     Problem: METABOLIC, FLUID AND ELECTROLYTES - ADULT  Goal: Electrolytes maintained within normal limits  Description: INTERVENTIONS:  - Monitor labs and assess patient for signs and symptoms of electrolyte imbalances  - Administer electrolyte replacement as ordered  - Monitor response to electrolyte replacements, including repeat lab results as appropriate  - Instruct patient on fluid and nutrition as appropriate  Outcome: Progressing  Goal: Fluid balance maintained  Description: INTERVENTIONS:  - Monitor labs   - Monitor I/O and WT  - Instruct patient on fluid and nutrition as appropriate  - Assess for signs & symptoms of volume excess or deficit  Outcome: Progressing  Goal: Glucose maintained within target range  Description: INTERVENTIONS:  - Monitor Blood Glucose as ordered  - Assess for signs and symptoms of hyperglycemia and hypoglycemia  - Administer ordered medications to maintain glucose within target range  - Assess nutritional intake and initiate nutrition service referral as needed  Outcome: Progressing     Problem: Nutrition/Hydration-ADULT  Goal: Nutrient/Hydration intake  appropriate for improving, restoring or maintaining nutritional needs  Description: Monitor and assess patient's nutrition/hydration status for malnutrition. Collaborate with interdisciplinary team and initiate plan and interventions as ordered.  Monitor patient's weight and dietary intake as ordered or per policy. Utilize nutrition screening tool and intervene as necessary. Determine patient's food preferences and provide high-protein, high-caloric foods as appropriate.     INTERVENTIONS:  - Monitor oral intake, urinary output, labs, and treatment plans  - Assess nutrition and hydration status and recommend course of action  - Evaluate amount of meals eaten  - Assist patient with eating if necessary   - Allow adequate time for meals  - Recommend/ encourage appropriate diets, oral nutritional supplements, and vitamin/mineral supplements  - Order, calculate, and assess calorie counts as needed  - Recommend, monitor, and adjust tube feedings and TPN/PPN based on assessed needs  - Assess need for intravenous fluids  - Provide specific nutrition/hydration education as appropriate  - Include patient/family/caregiver in decisions related to nutrition  Outcome: Progressing     Problem: Prexisting or High Potential for Compromised Skin Integrity  Goal: Skin integrity is maintained or improved  Description: INTERVENTIONS:  - Identify patients at risk for skin breakdown  - Assess and monitor skin integrity including under and around medical devices   - Assess and monitor nutrition and hydration status  - Monitor labs  - Assess for incontinence   - Turn and reposition patient  - Assist with mobility/ambulation  - Relieve pressure over keith prominences   - Avoid friction and shearing  - Provide appropriate hygiene as needed including keeping skin clean and dry  - Evaluate need for skin moisturizer/barrier cream  - Collaborate with interdisciplinary team  - Patient/family teaching  - Consider wound care consult    Assess:  -  Review Jose scale daily  - Clean and moisturize skin every shift  - Inspect skin when repositioning, toileting, and assisting with ADLS  - Assess under medical devices such as masimo every shift  - Assess extremities for adequate circulation and sensation     Bed Management:  - Have minimal linens on bed & keep smooth, unwrinkled  - Change linens as needed when moist or perspiring  - Avoid sitting or lying in one position for more than 2 hours while in bed?Keep HOB at 30 degrees   - Toileting:  - Offer bedside commode  - Assess for incontinence every 2 hrs  - Use incontinent care products after each incontinent episode such as lotion    Activity:  - Mobilize patient 3 times a day  - Encourage activity and walks on unit  - Encourage or provide ROM exercises   - Turn and reposition patient every 2 Hours  - Use appropriate equipment to lift or move patient in bed  - Instruct/ Assist with weight shifting every 1 hr when out of bed in chair  - Consider limitation of chair time 1 hour intervals    Skin Care:  - Avoid use of baby powder, tape, friction and shearing, hot water or constrictive clothing  - Relieve pressure over bony prominences using allevyn  - Do not massage red bony areas    Next Steps:  - Teach patient strategies to minimize risks such as repositioning   - Consider consults to  interdisciplinary teams such as PTOT  Outcome: Progressing     Problem: Potential for Falls  Goal: Patient will remain free of falls  Description: INTERVENTIONS:  - Educate patient/family on patient safety including physical limitations  - Instruct patient to call for assistance with activity   - Consider consulting OT/PT to assist with strengthening/mobility based on AM PAC & JH-HLM score  - Consult OT/PT to assist with strengthening/mobility   - Keep Call bell within reach  - Keep bed low and locked with side rails adjusted as appropriate  - Keep care items and personal belongings within reach  - Initiate and maintain comfort  rounds  - Make Fall Risk Sign visible to staff  - Offer Toileting every 2 Hours, in advance of need  - Initiate/Maintain bed alarm  - Obtain necessary fall risk management equipment: yellow socks.  - Apply yellow socks and bracelet for high fall risk patients  - Consider moving patient to room near nurses station  Outcome: Progressing

## 2025-06-16 NOTE — ASSESSMENT & PLAN NOTE
BP at goal with tendency for hypotension.   Current Rx: Amlodipine 5 mg twice a day, carvedilol 6.25 mg twice a day, hydralazine 25 mg 3 times per day.  Stop hydralazine in light of occasionally low BP.

## 2025-06-17 PROBLEM — E43 SEVERE PROTEIN-CALORIE MALNUTRITION (HCC): Status: ACTIVE | Noted: 2025-06-17

## 2025-06-17 LAB
GLUCOSE SERPL-MCNC: 142 MG/DL (ref 65–140)
GLUCOSE SERPL-MCNC: 168 MG/DL (ref 65–140)
GLUCOSE SERPL-MCNC: 245 MG/DL (ref 65–140)

## 2025-06-17 PROCEDURE — 99232 SBSQ HOSP IP/OBS MODERATE 35: CPT | Performed by: INTERNAL MEDICINE

## 2025-06-17 PROCEDURE — 82948 REAGENT STRIP/BLOOD GLUCOSE: CPT

## 2025-06-17 RX ORDER — HYDROMORPHONE HCL IN WATER/PF 6 MG/30 ML
0.2 PATIENT CONTROLLED ANALGESIA SYRINGE INTRAVENOUS
Status: DISCONTINUED | OUTPATIENT
Start: 2025-06-17 | End: 2025-06-20 | Stop reason: HOSPADM

## 2025-06-17 RX ADMIN — PANTOPRAZOLE SODIUM 40 MG: 40 TABLET, DELAYED RELEASE ORAL at 06:35

## 2025-06-17 RX ADMIN — FOLIC ACID 1 MG: 1 TABLET ORAL at 07:53

## 2025-06-17 RX ADMIN — INSULIN ASPART 8 UNITS: 100 INJECTION, SUSPENSION SUBCUTANEOUS at 07:54

## 2025-06-17 RX ADMIN — INSULIN LISPRO 2 UNITS: 100 INJECTION, SOLUTION INTRAVENOUS; SUBCUTANEOUS at 23:27

## 2025-06-17 RX ADMIN — DOCUSATE SODIUM 100 MG: 100 CAPSULE, LIQUID FILLED ORAL at 07:54

## 2025-06-17 RX ADMIN — AMLODIPINE BESYLATE 5 MG: 5 TABLET ORAL at 07:53

## 2025-06-17 RX ADMIN — PANCRELIPASE 24000 UNITS: 120000; 24000; 76000 CAPSULE, DELAYED RELEASE PELLETS ORAL at 07:54

## 2025-06-17 RX ADMIN — OCTREOTIDE ACETATE 100 MCG: 100 INJECTION, SOLUTION INTRAVENOUS; SUBCUTANEOUS at 23:28

## 2025-06-17 RX ADMIN — SENNOSIDES 17.2 MG: 8.6 TABLET, FILM COATED ORAL at 23:28

## 2025-06-17 RX ADMIN — SODIUM BICARBONATE 650 MG TABLET 1300 MG: at 07:53

## 2025-06-17 RX ADMIN — OCTREOTIDE ACETATE 100 MCG: 100 INJECTION, SOLUTION INTRAVENOUS; SUBCUTANEOUS at 06:35

## 2025-06-17 RX ADMIN — CARVEDILOL 6.25 MG: 6.25 TABLET, FILM COATED ORAL at 07:54

## 2025-06-17 RX ADMIN — INSULIN LISPRO 1 UNITS: 100 INJECTION, SOLUTION INTRAVENOUS; SUBCUTANEOUS at 07:55

## 2025-06-17 RX ADMIN — THIAMINE HCL TAB 100 MG 100 MG: 100 TAB at 07:54

## 2025-06-17 RX ADMIN — ALBUMIN (HUMAN) 25 G: 0.25 INJECTION, SOLUTION INTRAVENOUS at 23:27

## 2025-06-17 RX ADMIN — ALBUMIN (HUMAN) 25 G: 0.25 INJECTION, SOLUTION INTRAVENOUS at 06:34

## 2025-06-17 RX ADMIN — POLYETHYLENE GLYCOL 3350 17 G: 17 POWDER, FOR SOLUTION ORAL at 07:54

## 2025-06-17 NOTE — ASSESSMENT & PLAN NOTE
Night of admission 6/12/2025 developed coffee-ground emesis and melena  6/13/2025 underwent EGD which showed esophagitis, no esophageal or gastric varices.  Rest of the exam normal  Per GI suspect coffee-ground emesis in the setting of severe esophagitis  6/14 for hemoglobin of 6.9 received a unit of blood  Hemoglobin has stabilized.  No signs of GI bleed  Continue Protonix 40 mg twice daily  Transfuse if hemoglobin less than 7

## 2025-06-17 NOTE — DISCHARGE INSTR - OTHER ORDERS
Carolin Nicholson  Certified     [facility] [place] [facility]  [phone]  M-F 8am-4:30pm    AA meeting guide   [email or web address].[email or web address]-[email or web address]    AA Phone apps:   Meeting Guide  Everything AA  In the Rooms    AA/24 hour hotline   [phone]    [employer]   [email or web address]  [phone]       [place]  [employer] [facility]  [address]  [facility] [address]  [phone]    [place]  [address] [address]  [phone]    [facility]   [address] [facility] [address]  [phone]    [place]  [facility] [place], [place]  Address: [address] [place] [address]  Phone: [phone]      Persian Speaking  Facilities    [email or web address]  [facility]  Learn more about these services  by contacting [phone]    [facility] [facility] (Some Persian speaking staff)   [address], [place] [address]  Phone: [phone]   Favor more muscular strain (diffuse, posterior shoulder, upper arm, forearm), though neck (cervical spine) source is not excluded.  Icing, heat, over-the-counter medication as needed.  Plan physical therapy next.  Referral placed.  We discussed consideration of additional imaging of the cervical spine with MRI, but this is not required currently given assessment and plan for other intervention.  Briefly discussed consideration for support options; you have already tried forearm strap, without benefit.  Hold with trial of wrist brace.  Okay to continue with chiropractic care if beneficial and desired.  Otherwise, monitor with physical therapy over the next 3 to 4 weeks.  If persistent issues, may consider above considerations, including C-spine imaging.    If you have any further questions for your physician or physician s care team you can call [phone] and use option 3 to leave a voice message. Calls received during business hours will be returned same day.

## 2025-06-17 NOTE — ASSESSMENT & PLAN NOTE
Baseline creatinine is around 1.9-2.1 in April to May 2025.  During recent admission to [facility] from May to June 2025, creatinine was around 2.9-3.0 on discharge.  SCr 3.01 on 6/6/2025.  Admission creatinine 2.43 on 6/12/25.   Renal function worsening. SCr up to 3.22 yesterday. No labs today.   Etiology - prerenal azotemia vs HRS. No hydronephrosis on CT.   Follow up UA and urine Na - ordered but not sent.   Continue Albumin 25 gm every 8 hours.   Continue Octreotide 100 mcg q8H.

## 2025-06-17 NOTE — ASSESSMENT & PLAN NOTE
Lab Results   Component Value Date    EGFR 20 06/16/2025    EGFR 21 06/15/2025    EGFR 24 06/14/2025    CREATININE 3.22 (H) 06/16/2025    CREATININE 3.05 (H) 06/15/2025    CREATININE 2.81 (H) 06/14/2025   Seen by nephrology on his previous admission, last seen on 6/6/2025  Per nephro: Baseline creatinine has been from 1.5-2.0 but on last admission his creatinine plateaued at 2.8-3.0 which may represent his new baseline due to his previous advanced CKD and recent ZENAIDA  He is high risk for dialysis in the near future  Creatinine not done today because the patient refused.  Discussed with GI, will start albumin 25% 25 g every 8 hours x 3  Kidney function is somewhat worse today.  Nephrology consult is greatly appreciated.  The patient has been started on octreotide.

## 2025-06-17 NOTE — PLAN OF CARE
Problem: PAIN - ADULT  Goal: Verbalizes/displays adequate comfort level or baseline comfort level  Description: Interventions:  - Encourage patient to monitor pain and request assistance  - Assess pain using appropriate pain scale  - Administer analgesics as ordered based on type and severity of pain and evaluate response  - Implement non-pharmacological measures as appropriate and evaluate response  - Consider cultural and social influences on pain and pain management  - Notify physician/advanced practitioner if interventions unsuccessful or patient reports new pain  - Educate patient/family on pain management process including their role and importance of  reporting pain   - Provide non-pharmacologic/complimentary pain relief interventions  6/17/2025 0124 by Mojgan Caballero  Outcome: Progressing  6/17/2025 0123 by Mojgan Caballero  Outcome: Progressing     Problem: INFECTION - ADULT  Goal: Absence or prevention of progression during hospitalization  Description: INTERVENTIONS:  - Assess and monitor for signs and symptoms of infection  - Monitor lab/diagnostic results  - Monitor all insertion sites, i.e. indwelling lines, tubes, and drains  - Monitor endotracheal if appropriate and nasal secretions for changes in amount and color  - [place] appropriate cooling/warming therapies per order  - Administer medications as ordered  - Instruct and encourage patient and family to use good hand hygiene technique  - Identify and instruct in appropriate isolation precautions for identified infection/condition  6/17/2025 0124 by Mojgan Caballero  Outcome: Progressing  6/17/2025 0123 by Mojgan Caballero  Outcome: Progressing  Goal: Absence of fever/infection during neutropenic period  Description: INTERVENTIONS:  - Monitor WBC  - Perform strict hand hygiene  - Limit to healthy visitors only  - No plants, dried, fresh or silk flowers with mcclure in patient room  6/17/2025 0124 by Mojgan Caballero  Outcome: Progressing  6/17/2025 0123 by  Mojgan Caballero  Outcome: Progressing     Problem: SAFETY ADULT  Goal: Patient will remain free of falls  Description: INTERVENTIONS:  - Educate patient/family on patient safety including physical limitations  - Instruct patient to call for assistance with activity   - Consider consulting OT/PT to assist with strengthening/mobility based on AM PAC & JH-HLM score  - Consult OT/PT to assist with strengthening/mobility   - Keep Call bell within reach  - Keep bed low and locked with side rails adjusted as appropriate  - Keep care items and personal belongings within reach  - Initiate and maintain comfort rounds  - Make Fall Risk Sign visible to staff  - Offer Toileting every 2 Hours, in advance of need  - Initiate/Maintain bed alarm  - Obtain necessary fall risk management equipment: yellow socks.  - Apply yellow socks and bracelet for high fall risk patients  - Consider moving patient to room near nurses station  6/17/2025 0124 by Mojgan Caballero  Outcome: Progressing  6/17/2025 0123 by Mojgan Caballero  Outcome: Progressing  Goal: Maintain or return to baseline ADL function  Description: INTERVENTIONS:  -  Assess patient's ability to carry out ADLs; assess patient's baseline for ADL function and identify physical deficits which impact ability to perform ADLs (bathing, care of mouth/teeth, toileting, grooming, dressing, etc.)  - Assess/evaluate cause of self-care deficits   - Assess range of motion  - Assess patient's mobility; develop plan if impaired  - Assess patient's need for assistive devices and provide as appropriate  - Encourage maximum independence but intervene and supervise when necessary  - Involve family in performance of ADLs  - Assess for home care needs following discharge   - Consider OT consult to assist with ADL evaluation and planning for discharge  - Provide patient education as appropriate  - Monitor functional capacity and physical performance, use of AM PAC & JH-HLM   - Monitor gait, balance and  fatigue with ambulation    6/17/2025 0124 by Mojgan Caballero  Outcome: Progressing  6/17/2025 0123 by Mojgan Caballero  Outcome: Progressing  Goal: Maintains/Returns to pre admission functional level  Description: INTERVENTIONS:  - Perform AM-PAC 6 Click Basic Mobility/ Daily Activity assessment daily.  - Set and communicate daily mobility goal to care team and patient/family/caregiver.   - Collaborate with rehabilitation services on mobility goals if consulted  - Perform Range of Motion 3 times a day.  - Reposition patient every 2 hours.  - Dangle patient 3 times a day  - Stand patient 3 times a day  - Ambulate patient 3 times a day  - Out of bed to chair 3 times a day   - Out of bed for meals 3 times a day  - Out of bed for toileting  - Record patient progress and toleration of activity level   6/17/2025 0124 by Mojgan Caballero  Outcome: Progressing  6/17/2025 0123 by Mojgan Caballero  Outcome: Progressing     Problem: DISCHARGE PLANNING  Goal: Discharge to home or other facility with appropriate resources  Description: INTERVENTIONS:  - Identify barriers to discharge w/patient and caregiver  - Arrange for needed discharge resources and transportation as appropriate  - Identify discharge learning needs (meds, wound care, etc.)  - Arrange for interpretive services to assist at discharge as needed  - Refer to Case Management Department for coordinating discharge planning if the patient needs post-hospital services based on physician/advanced practitioner order or complex needs related to functional status, cognitive ability, or social support system  6/17/2025 0124 by Mojgan Caballero  Outcome: Progressing  6/17/2025 0123 by Mojgan Caballero  Outcome: Progressing     Problem: Knowledge Deficit  Goal: Patient/family/caregiver demonstrates understanding of disease process, treatment plan, medications, and discharge instructions  Description: Complete learning assessment and assess knowledge base.  Interventions:  - Provide  teaching at level of understanding  - Provide teaching via preferred learning methods  6/17/2025 0124 by Mojgan Caballero  Outcome: Progressing  6/17/2025 0123 by Mojgan Caballero  Outcome: Progressing     Problem: GASTROINTESTINAL - ADULT  Goal: Minimal or absence of nausea and/or vomiting  Description: INTERVENTIONS:  - Administer IV fluids if ordered to ensure adequate hydration  - Maintain NPO status until nausea and vomiting are resolved  - Nasogastric tube if ordered  - Administer ordered antiemetic medications as needed  - Provide nonpharmacologic comfort measures as appropriate  - Advance diet as tolerated, if ordered  - Consider nutrition services referral to assist patient with adequate nutrition and appropriate food choices  6/17/2025 0124 by Mojgan Caballero  Outcome: Progressing  6/17/2025 0123 by Mojgan Caballero  Outcome: Progressing  Goal: Maintains or returns to baseline bowel function  Description: INTERVENTIONS:  - Assess bowel function  - Encourage oral fluids to ensure adequate hydration  - Administer IV fluids if ordered to ensure adequate hydration  - Administer ordered medications as needed  - Encourage mobilization and activity  - Consider nutritional services referral to assist patient with adequate nutrition and appropriate food choices  6/17/2025 0124 by Mojgan Caballero  Outcome: Progressing  6/17/2025 0123 by Mojgan Caballero  Outcome: Progressing  Goal: Maintains adequate nutritional intake  Description: INTERVENTIONS:  - Monitor percentage of each meal consumed  - Identify factors contributing to decreased intake, treat as appropriate  - Assist with meals as needed  - Monitor I&O, weight, and lab values if indicated  - Obtain nutrition services referral as needed  6/17/2025 0124 by Mojgan Caballero  Outcome: Progressing  6/17/2025 0123 by Mojgan Caballero  Outcome: Progressing     Problem: METABOLIC, FLUID AND ELECTROLYTES - ADULT  Goal: Electrolytes maintained within normal limits  Description:  INTERVENTIONS:  - Monitor labs and assess patient for signs and symptoms of electrolyte imbalances  - Administer electrolyte replacement as ordered  - Monitor response to electrolyte replacements, including repeat lab results as appropriate  - Instruct patient on fluid and nutrition as appropriate  6/17/2025 0124 by Mojgan Caballero  Outcome: Progressing  6/17/2025 0123 by Mojgan Caballero  Outcome: Progressing  Goal: Fluid balance maintained  Description: INTERVENTIONS:  - Monitor labs   - Monitor I/O and WT  - Instruct patient on fluid and nutrition as appropriate  - Assess for signs & symptoms of volume excess or deficit  6/17/2025 0124 by Mojgan Caballero  Outcome: Progressing  6/17/2025 0123 by Mojgan Caballero  Outcome: Progressing  Goal: Glucose maintained within target range  Description: INTERVENTIONS:  - Monitor Blood Glucose as ordered  - Assess for signs and symptoms of hyperglycemia and hypoglycemia  - Administer ordered medications to maintain glucose within target range  - Assess nutritional intake and initiate nutrition service referral as needed  6/17/2025 0124 by Mojgan Caballero  Outcome: Progressing  6/17/2025 0123 by Mojgan Caballero  Outcome: Progressing     Problem: Nutrition/Hydration-ADULT  Goal: Nutrient/Hydration intake appropriate for improving, restoring or maintaining nutritional needs  Description: Monitor and assess patient's nutrition/hydration status for malnutrition. Collaborate with interdisciplinary team and initiate plan and interventions as ordered.  Monitor patient's weight and dietary intake as ordered or per policy. Utilize nutrition screening tool and intervene as necessary. Determine patient's food preferences and provide high-protein, high-caloric foods as appropriate.     INTERVENTIONS:  - Monitor oral intake, urinary output, labs, and treatment plans  - Assess nutrition and hydration status and recommend course of action  - Evaluate amount of meals eaten  - Assist patient with  eating if necessary   - Allow adequate time for meals  - Recommend/ encourage appropriate diets, oral nutritional supplements, and vitamin/mineral supplements  - Order, calculate, and assess calorie counts as needed  - Recommend, monitor, and adjust tube feedings and TPN/PPN based on assessed needs  - Assess need for intravenous fluids  - Provide specific nutrition/hydration education as appropriate  - Include patient/family/caregiver in decisions related to nutrition  6/17/2025 0124 by Mojgan Caballero  Outcome: Progressing  6/17/2025 0123 by Mojgan Caballero  Outcome: Progressing     Problem: Prexisting or High Potential for Compromised Skin Integrity  Goal: Skin integrity is maintained or improved  Description: INTERVENTIONS:  - Identify patients at risk for skin breakdown  - Assess and monitor skin integrity including under and around medical devices   - Assess and monitor nutrition and hydration status  - Monitor labs  - Assess for incontinence   - Turn and reposition patient  - Assist with mobility/ambulation  - Relieve pressure over keith prominences   - Avoid friction and shearing  - Provide appropriate hygiene as needed including keeping skin clean and dry  - Evaluate need for skin moisturizer/barrier cream  - Collaborate with interdisciplinary team  - Patient/family teaching  - Consider wound care consult    Assess:  - Review Jose scale daily  - Clean and moisturize skin every shift  - Inspect skin when repositioning, toileting, and assisting with ADLS  - Assess under medical devices such as masimo every shift  - Assess extremities for adequate circulation and sensation     Bed Management:  - Have minimal linens on bed & keep smooth, unwrinkled  - Change linens as needed when moist or perspiring  - Avoid sitting or lying in one position for more than 2 hours while in bed?Keep HOB at 30 degrees   - Toileting:  - Offer bedside commode  - Assess for incontinence every 2 hrs  - Use incontinent care products  after each incontinent episode such as lotion    Activity:  - Mobilize patient 3 times a day  - Encourage activity and walks on unit  - Encourage or provide ROM exercises   - Turn and reposition patient every 2 Hours  - Use appropriate equipment to lift or move patient in bed  - Instruct/ Assist with weight shifting every 1 hr when out of bed in chair  - Consider limitation of chair time 1 hour intervals    Skin Care:  - Avoid use of baby powder, tape, friction and shearing, hot water or constrictive clothing  - Relieve pressure over bony prominences using allevyn  - Do not massage red bony areas    Next Steps:  - Teach patient strategies to minimize risks such as repositioning   - Consider consults to  interdisciplinary teams such as PTOT  6/17/2025 0124 by Mojgan Caballero  Outcome: Progressing  6/17/2025 0123 by Mojgan Caballero  Outcome: Progressing     Problem: Potential for Falls  Goal: Patient will remain free of falls  Description: INTERVENTIONS:  - Educate patient/family on patient safety including physical limitations  - Instruct patient to call for assistance with activity   - Consider consulting OT/PT to assist with strengthening/mobility based on AM PAC & -HLM score  - Consult OT/PT to assist with strengthening/mobility   - Keep Call bell within reach  - Keep bed low and locked with side rails adjusted as appropriate  - Keep care items and personal belongings within reach  - Initiate and maintain comfort rounds  - Make Fall Risk Sign visible to staff  - Offer Toileting every 2 Hours, in advance of need  - Initiate/Maintain bed alarm  - Obtain necessary fall risk management equipment: yellow socks.  - Apply yellow socks and bracelet for high fall risk patients  - Consider moving patient to room near nurses station  6/17/2025 0124 by Mojgan Caballero  Outcome: Progressing  6/17/2025 0123 by Mojgan Caballero  Outcome: Progressing

## 2025-06-17 NOTE — PHYSICAL THERAPY NOTE
PHYSICAL THERAPY NOTE          Patient Name: Wes Araujo  Today's Date: 6/17/2025 06/17/25 1546   PT Last Visit   PT Visit Date 06/17/25   Note Type   Note Type Cancelled Session   Cancel Reasons Refusal  (Attempted PT tx however refused to participate. Pt c/o severe abdominal & back pain. Encouraged several times but pt continue to refused. RN made aware. Will continue PT per POC.)     Michele Arizmendi

## 2025-06-17 NOTE — NURSING NOTE
Patient still refusing blood work and IV change. Patient states having abdominal pain, moderate to severe and that he doesn't want to ear at time. EUGENIO made aware.    Flash Reis 6/17/2025 10:36 AM

## 2025-06-17 NOTE — PROGRESS NOTES
Progress Note - Hospitalist   Name: Wes Araujo 55 y.o. male I MRN: [id number]  Unit/Bed#: [place] [address] -01 I Date of Admission: 6/12/2025   Date of Service: 6/17/2025 I Hospital Day: 5    Assessment & Plan  Alcoholic hepatitis with ascites  Patient is a 55-year-old male with a past medical history of alcohol abuse, alcoholic liver cirrhosis, anemia, hypertension, type 2 diabetes, history of atrial fibrillation, chronic pancreatitis who presented to the hospital with abdominal pain and distention.  Overnight on the day of admission developed coffee-ground emesis and melena  Patient was admitted from 5/23/2025 to 6/6/2025 secondary to toxic metabolic encephalopathy secondary to hyperglycemia, HHNK, kidney injury and acute liver failure.  Received insulin, IV fluids, seen by gastroenterology, DF did not meet criteria for steroids, infection was ruled out, initial plan was for patient to be discharged to inpatient alcoholic rehab but patient refused and he was discharged home    6/12/2025 CT showed large volume ascites, increased since 6/3/2025   6/13/2025 underwent paracentesis 1670 cc clear fluid removed, no evidence of SBP, fluid cultures are pending  6/13/2025 underwent EGD which showed esophagitis.  No esophageal or gastric varices.  Ceftriaxone for 5 days for SBP prophylaxis in the setting of GI bleed  No indication for prednisolone  Due to slight worsening of kidney functions start albumin 25% 25 mg every 8 hours x 3  Followed by GI  Coffee ground emesis  Night of admission 6/12/2025 developed coffee-ground emesis and melena  6/13/2025 underwent EGD which showed esophagitis, no esophageal or gastric varices.  Rest of the exam normal  Per GI suspect coffee-ground emesis in the setting of severe esophagitis  6/14 for hemoglobin of 6.9 received a unit of blood  Hemoglobin has stabilized.  No signs of GI bleed  Continue Protonix 40 mg twice daily  Transfuse if hemoglobin less than 7  Melena  Secondary to severe  esophagitis  See plan above  Anemia  Recent hemoglobin in the range of 7-9  Patient presented with abdominal pain, had coffee-ground emesis and melena on this admission  Underwent EGD which showed severe esophagitis most likely the cause of the coffee-ground emesis and melena  Received a unit of blood on 6/14  Hemoglobin stable today at 8.5  CKD (chronic kidney disease) stage 4, GFR 15-29 ml/min ([facility])  Lab Results   Component Value Date    EGFR 20 06/16/2025    EGFR 21 06/15/2025    EGFR 24 06/14/2025    CREATININE 3.22 (H) 06/16/2025    CREATININE 3.05 (H) 06/15/2025    CREATININE 2.81 (H) 06/14/2025   Seen by nephrology on his previous admission, last seen on 6/6/2025  Per nephro: Baseline creatinine has been from 1.5-2.0 but on last admission his creatinine plateaued at 2.8-3.0 which may represent his new baseline due to his previous advanced CKD and recent ZENAIDA  He is high risk for dialysis in the near future  Creatinine not done today because the patient refused.  Discussed with GI, will start albumin 25% 25 g every 8 hours x 3  Kidney function is somewhat worse today.  Nephrology consult is greatly appreciated.  The patient has been started on octreotide.  Alcohol abuse  Pt with history of alcohol abuse with withdrawal. He states he typically drinks multiple beer a day, but states he has not been drinking since he was discharged from the hospital last week  ETOH <10  Monitor on CIWA for now  Vitamin supplementation  Encourage continued cessation  Type 2 diabetes mellitus with hyperglycemia, with long-term current use of insulin ([facility])  Lab Results   Component Value Date    HGBA1C 9.6 (H) 05/26/2025       Recent Labs     06/16/25 2038 06/16/25  2106 06/17/25  0652 06/17/25  1101   POCGLU 68 73 168* 142*       HHNK on his previous admission due to noncompliance and alcohol abuse  Home regimen is 70/30, 8 units twice daily, which was held due to coffee-ground emesis  Restarted 70/30 at 8 units twice daily on  6/14  Generalized weakness  Pt reports that since discharge he has been having difficulty with ambulation  Typically he ambulates without assistive devices, but he reports he has been struggling  PT/OT eval  Primary hypertension  Continue amlodipine 5 mg twice daily, hydralazine 25 mg 3 times daily, and Coreg 6.25 twice daily with holding parameters  Chronic pancreatitis (HCC)  Patient with a history of chronic pancreatitis secondary to alcohol   Chronic pancreatitis stable on CT imaging on admission    History of atrial fibrillation  History of atrial fibrillation, continue Coreg  Not on anticoagulation at baseline  Constipation  Pt reports no bowel movement for 6 days  Bowel regimen initiated  Severe protein-calorie malnutrition (HCC)  Malnutrition Findings:   Adult Malnutrition type: Acute illness  Adult Degree of Malnutrition: Other severe protein calorie malnutrition  Malnutrition Characteristics: Fat loss, Muscle loss, Inadequate energy     360 Statement: Severe calorie protein malnutrition in context of acute illness r/t inadequte PO intake in setting of abdominal distention/ pain as evidenced by  moderate body fat (triceps, ribcage area) and muscle mass depletions (temporal wasting, protruding clavicles) energy intake less than 50% compared to estimated needs>5 days; Treated with oral supplements    BMI Findings:    Body mass index is 21 kg/m².       Subjective:  The patient has some abdominal pain.  His abdomen is distended.  He has had no vomiting.  His bowels are moving.  He denies chest pain or shortness of breath.  He refused lab work today.    Physical Exam:   Temp:  [97.5 °F (36.4 °C)-98.4 °F (36.9 °C)] 98.2 °F (36.8 °C)  HR:  [66-68] 68  BP: (136-155)/(68-72) 155/72  Resp:  [18] 18  SpO2:  [95 %-96 %] 95 %  O2 Device: None (Room air)    Gen: Well-developed, chronically ill, in no distress.  Neck: Supple.  No lymphadenopathy or goiter.  Heart: Regular rhythm.  No murmur, gallop, or rub.  Lungs: Clear  to auscultation and percussion.  No wheezing, rales, or rhonchi.  Abd: Soft with with active bowel sounds.  Distended.  Generalized moderate tenderness.  No rebound.  Extremities: No clubbing, cyanosis, or edema.  No calf tenderness.  Neuro: Alert and oriented.  No focal sign.  Skin: Warm and dry.      LABS: No new labs.        VTE Pharmacologic Prophylaxis: Sequential compression device (Venodyne)   VTE Mechanical Prophylaxis: sequential compression device

## 2025-06-17 NOTE — CERTIFIED RECOVERY SPECIALIST
Certified  Note      Name: Wes Araujo 55 y.o. male I MRN: [id number]  Unit/Bed#: [place] [address] -01 I Date of Admission: 6/12/2025   Date of Service: 6/17/2025 I Hospital Day: 5    Summary of Contact   CRS met with patient to offer support. Patient appeared sleepy, said hello, and CRS used interpretor to try and engage in conversation. Patient was not receptive, CRS disconnected with interpretor and left room. CRS spoke with patients nurse who will Chat CRS if / when patient is more awake, alert and receptive.    Follow up: Tomorrow or as indicated above. CRS provided resources in chart.                      '      Administrative Statements  I have spent a total time of 11 minutes in caring for this patient on the day of the visit/encounter.    Carolin Nicholson

## 2025-06-17 NOTE — PLAN OF CARE
Problem: PAIN - ADULT  Goal: Verbalizes/displays adequate comfort level or baseline comfort level  Description: Interventions:  - Encourage patient to monitor pain and request assistance  - Assess pain using appropriate pain scale  - Administer analgesics as ordered based on type and severity of pain and evaluate response  - Implement non-pharmacological measures as appropriate and evaluate response  - Consider cultural and social influences on pain and pain management  - Notify physician/advanced practitioner if interventions unsuccessful or patient reports new pain  - Educate patient/family on pain management process including their role and importance of  reporting pain   - Provide non-pharmacologic/complimentary pain relief interventions  Outcome: Progressing     Problem: INFECTION - ADULT  Goal: Absence or prevention of progression during hospitalization  Description: INTERVENTIONS:  - Assess and monitor for signs and symptoms of infection  - Monitor lab/diagnostic results  - Monitor all insertion sites, i.e. indwelling lines, tubes, and drains  - Monitor endotracheal if appropriate and nasal secretions for changes in amount and color  - [place] appropriate cooling/warming therapies per order  - Administer medications as ordered  - Instruct and encourage patient and family to use good hand hygiene technique  - Identify and instruct in appropriate isolation precautions for identified infection/condition  Outcome: Progressing  Goal: Absence of fever/infection during neutropenic period  Description: INTERVENTIONS:  - Monitor WBC  - Perform strict hand hygiene  - Limit to healthy visitors only  - No plants, dried, fresh or silk flowers with mcclure in patient room  Outcome: Progressing     Problem: SAFETY ADULT  Goal: Patient will remain free of falls  Description: INTERVENTIONS:  - Educate patient/family on patient safety including physical limitations  - Instruct patient to call for assistance with activity   -  Consider consulting OT/PT to assist with strengthening/mobility based on AM PAC & JH-HLM score  - Consult OT/PT to assist with strengthening/mobility   - Keep Call bell within reach  - Keep bed low and locked with side rails adjusted as appropriate  - Keep care items and personal belongings within reach  - Initiate and maintain comfort rounds  - Make Fall Risk Sign visible to staff  - Offer Toileting every 2 Hours, in advance of need  - Initiate/Maintain bed alarm  - Obtain necessary fall risk management equipment: yellow socks.  - Apply yellow socks and bracelet for high fall risk patients  - Consider moving patient to room near nurses station  Outcome: Progressing  Goal: Maintain or return to baseline ADL function  Description: INTERVENTIONS:  -  Assess patient's ability to carry out ADLs; assess patient's baseline for ADL function and identify physical deficits which impact ability to perform ADLs (bathing, care of mouth/teeth, toileting, grooming, dressing, etc.)  - Assess/evaluate cause of self-care deficits   - Assess range of motion  - Assess patient's mobility; develop plan if impaired  - Assess patient's need for assistive devices and provide as appropriate  - Encourage maximum independence but intervene and supervise when necessary  - Involve family in performance of ADLs  - Assess for home care needs following discharge   - Consider OT consult to assist with ADL evaluation and planning for discharge  - Provide patient education as appropriate  - Monitor functional capacity and physical performance, use of AM PAC & JH-HLM   - Monitor gait, balance and fatigue with ambulation    Outcome: Progressing  Goal: Maintains/Returns to pre admission functional level  Description: INTERVENTIONS:  - Perform AM-PAC 6 Click Basic Mobility/ Daily Activity assessment daily.  - Set and communicate daily mobility goal to care team and patient/family/caregiver.   - Collaborate with rehabilitation services on mobility goals  if consulted  - Perform Range of Motion 3 times a day.  - Reposition patient every 2 hours.  - Dangle patient 3 times a day  - Stand patient 3 times a day  - Ambulate patient 3 times a day  - Out of bed to chair 3 times a day   - Out of bed for meals 3 times a day  - Out of bed for toileting  - Record patient progress and toleration of activity level   Outcome: Progressing     Problem: DISCHARGE PLANNING  Goal: Discharge to home or other facility with appropriate resources  Description: INTERVENTIONS:  - Identify barriers to discharge w/patient and caregiver  - Arrange for needed discharge resources and transportation as appropriate  - Identify discharge learning needs (meds, wound care, etc.)  - Arrange for interpretive services to assist at discharge as needed  - Refer to Case Management Department for coordinating discharge planning if the patient needs post-hospital services based on physician/advanced practitioner order or complex needs related to functional status, cognitive ability, or social support system  Outcome: Progressing     Problem: Knowledge Deficit  Goal: Patient/family/caregiver demonstrates understanding of disease process, treatment plan, medications, and discharge instructions  Description: Complete learning assessment and assess knowledge base.  Interventions:  - Provide teaching at level of understanding  - Provide teaching via preferred learning methods  Outcome: Progressing     Problem: GASTROINTESTINAL - ADULT  Goal: Minimal or absence of nausea and/or vomiting  Description: INTERVENTIONS:  - Administer IV fluids if ordered to ensure adequate hydration  - Maintain NPO status until nausea and vomiting are resolved  - Nasogastric tube if ordered  - Administer ordered antiemetic medications as needed  - Provide nonpharmacologic comfort measures as appropriate  - Advance diet as tolerated, if ordered  - Consider nutrition services referral to assist patient with adequate nutrition and  appropriate food choices  Outcome: Progressing  Goal: Maintains or returns to baseline bowel function  Description: INTERVENTIONS:  - Assess bowel function  - Encourage oral fluids to ensure adequate hydration  - Administer IV fluids if ordered to ensure adequate hydration  - Administer ordered medications as needed  - Encourage mobilization and activity  - Consider nutritional services referral to assist patient with adequate nutrition and appropriate food choices  Outcome: Progressing  Goal: Maintains adequate nutritional intake  Description: INTERVENTIONS:  - Monitor percentage of each meal consumed  - Identify factors contributing to decreased intake, treat as appropriate  - Assist with meals as needed  - Monitor I&O, weight, and lab values if indicated  - Obtain nutrition services referral as needed  Outcome: Progressing     Problem: METABOLIC, FLUID AND ELECTROLYTES - ADULT  Goal: Electrolytes maintained within normal limits  Description: INTERVENTIONS:  - Monitor labs and assess patient for signs and symptoms of electrolyte imbalances  - Administer electrolyte replacement as ordered  - Monitor response to electrolyte replacements, including repeat lab results as appropriate  - Instruct patient on fluid and nutrition as appropriate  Outcome: Progressing  Goal: Fluid balance maintained  Description: INTERVENTIONS:  - Monitor labs   - Monitor I/O and WT  - Instruct patient on fluid and nutrition as appropriate  - Assess for signs & symptoms of volume excess or deficit  Outcome: Progressing  Goal: Glucose maintained within target range  Description: INTERVENTIONS:  - Monitor Blood Glucose as ordered  - Assess for signs and symptoms of hyperglycemia and hypoglycemia  - Administer ordered medications to maintain glucose within target range  - Assess nutritional intake and initiate nutrition service referral as needed  Outcome: Progressing     Problem: Nutrition/Hydration-ADULT  Goal: Nutrient/Hydration intake  appropriate for improving, restoring or maintaining nutritional needs  Description: Monitor and assess patient's nutrition/hydration status for malnutrition. Collaborate with interdisciplinary team and initiate plan and interventions as ordered.  Monitor patient's weight and dietary intake as ordered or per policy. Utilize nutrition screening tool and intervene as necessary. Determine patient's food preferences and provide high-protein, high-caloric foods as appropriate.     INTERVENTIONS:  - Monitor oral intake, urinary output, labs, and treatment plans  - Assess nutrition and hydration status and recommend course of action  - Evaluate amount of meals eaten  - Assist patient with eating if necessary   - Allow adequate time for meals  - Recommend/ encourage appropriate diets, oral nutritional supplements, and vitamin/mineral supplements  - Order, calculate, and assess calorie counts as needed  - Recommend, monitor, and adjust tube feedings and TPN/PPN based on assessed needs  - Assess need for intravenous fluids  - Provide specific nutrition/hydration education as appropriate  - Include patient/family/caregiver in decisions related to nutrition  Outcome: Progressing     Problem: Prexisting or High Potential for Compromised Skin Integrity  Goal: Skin integrity is maintained or improved  Description: INTERVENTIONS:  - Identify patients at risk for skin breakdown  - Assess and monitor skin integrity including under and around medical devices   - Assess and monitor nutrition and hydration status  - Monitor labs  - Assess for incontinence   - Turn and reposition patient  - Assist with mobility/ambulation  - Relieve pressure over keith prominences   - Avoid friction and shearing  - Provide appropriate hygiene as needed including keeping skin clean and dry  - Evaluate need for skin moisturizer/barrier cream  - Collaborate with interdisciplinary team  - Patient/family teaching  - Consider wound care consult    Assess:  -  Review Jose scale daily  - Clean and moisturize skin every shift  - Inspect skin when repositioning, toileting, and assisting with ADLS  - Assess under medical devices such as masimo every shift  - Assess extremities for adequate circulation and sensation     Bed Management:  - Have minimal linens on bed & keep smooth, unwrinkled  - Change linens as needed when moist or perspiring  - Avoid sitting or lying in one position for more than 2 hours while in bed?Keep HOB at 30 degrees   - Toileting:  - Offer bedside commode  - Assess for incontinence every 2 hrs  - Use incontinent care products after each incontinent episode such as lotion    Activity:  - Mobilize patient 3 times a day  - Encourage activity and walks on unit  - Encourage or provide ROM exercises   - Turn and reposition patient every 2 Hours  - Use appropriate equipment to lift or move patient in bed  - Instruct/ Assist with weight shifting every 1 hr when out of bed in chair  - Consider limitation of chair time 1 hour intervals    Skin Care:  - Avoid use of baby powder, tape, friction and shearing, hot water or constrictive clothing  - Relieve pressure over bony prominences using allevyn  - Do not massage red bony areas    Next Steps:  - Teach patient strategies to minimize risks such as repositioning   - Consider consults to  interdisciplinary teams such as PTOT  Outcome: Progressing     Problem: Potential for Falls  Goal: Patient will remain free of falls  Description: INTERVENTIONS:  - Educate patient/family on patient safety including physical limitations  - Instruct patient to call for assistance with activity   - Consider consulting OT/PT to assist with strengthening/mobility based on AM PAC & JH-HLM score  - Consult OT/PT to assist with strengthening/mobility   - Keep Call bell within reach  - Keep bed low and locked with side rails adjusted as appropriate  - Keep care items and personal belongings within reach  - Initiate and maintain comfort  rounds  - Make Fall Risk Sign visible to staff  - Offer Toileting every 2 Hours, in advance of need  - Initiate/Maintain bed alarm  - Obtain necessary fall risk management equipment: yellow socks.  - Apply yellow socks and bracelet for high fall risk patients  - Consider moving patient to room near nurses station  Outcome: Progressing

## 2025-06-17 NOTE — NURSING NOTE
"Pt still stating having abdominal pain, no medication available for moderate or severe pain in PRNs. SLIM made aware.     Pt allowed patient to attempt IV and blood work, but not in AC or certain locations \"near bones\". RN attempted once but vein blew. Pt would not allow RN to attempt again.    Flash Reis 6/17/2025 5:38 PM   "

## 2025-06-17 NOTE — PROGRESS NOTES
NEPHROLOGY HOSPITAL PROGRESS NOTE   Wes Araujo 55 y.o. male MRN: [id number]  Unit/Bed#: [place] [address] -01 Encounter: [id number]  Reason for Consult: ZENAIDA  Assessment & Plan  CKD (chronic kidney disease) stage 4, GFR 15-29 ml/min ([facility])  Baseline creatinine is around 1.9-2.1 in April to May 2025.  During recent admission to [facility] from May to June 2025, creatinine was around 2.9-3.0 on discharge.  SCr 3.01 on 6/6/2025.  Admission creatinine 2.43 on 6/12/25.   Renal function worsening. SCr up to 3.22 yesterday. No labs today.   Etiology - prerenal azotemia vs HRS. No hydronephrosis on CT.   Follow up UA and urine Na - ordered but not sent.   Continue Albumin 25 gm every 8 hours.   Continue Octreotide 100 mcg q8H.     Alcoholic hepatitis with ascites  GI following.  Had paracentesis on 6/13/2025 with 1670 cc drained.    Primary hypertension  BP is controlled.   Current Rx: Amlodipine 5 mg BID, Carvedilol 6.25 mg BID.   Hydralazine stopped on 6/16/25  No changes today.     Electrolyte abnormality  Metabolic acidosis:  Continue sodium bicarbonate 1300 mg twice a day.  No labs today.     Anemia  Hemoglobin stable.  Defer to primary service.    Coffee ground emesis  S/p EGD on 6/13/25 with esophagitis.  Currently on Protonix 40 mg BID.       TODAY's DISCUSSION/PLAN:  No labs today.   Continue Albumin 25 gm IV q8H   Continue Octreotide 100 mcg SC q8H.   Follow up UA and urine Na.     SUBJECTIVE / 24H INTERVAL HISTORY:  Refused labs this AM.   Complaining of abdominal distention.   Appetite is not great.     OBJECTIVE:  Current Weight: Weight - Scale: 64.5 kg (142 lb 3.2 oz)  Vitals:    06/16/25 1220 06/16/25 1523 06/16/25 2104 06/17/25 1103   BP: 122/61 132/78 136/68    BP Location:       Pulse: 73 73 67 66   Resp:  20 18    Temp: 97.6 °F (36.4 °C) 98.8 °F (37.1 °C) 97.5 °F (36.4 °C) 98.4 °F (36.9 °C)   TempSrc:  Oral Oral    SpO2: 97% 97% 96% 95%   Weight:       Height:           Intake/Output Summary (Last  "24 hours) at 6/17/2025 1251  Last data filed at 6/17/2025 0900  Gross per 24 hour   Intake 680 ml   Output 200 ml   Net 480 ml     General: conscious, cooperative, no distress  Skin: dry  Eyes: pink conjunctivae  ENT: moist mucous membranes  Respiratory: equal chest expansion, clear breath sounds.  Cardiovascular: distinct heart sounds, normal rate, regular rhythm, no rub  Abdomen: soft, non tender, distended, normal bowel sounds  Extremities: no edema.   Genitourinary: no ibrahim catheter.   Neuro: awake, alert.   Psych: agitated.     Medications:  Current Medications[1]    Laboratory Results:  Results from last 7 days   Lab Units 06/16/25  0506 06/15/25  0523 06/14/25  0447 06/13/25  0553 06/12/25  1819 06/12/25  1714 06/12/25  1642   WBC Thousand/uL 12.51* 12.40* 16.16* 11.44*  --   --  10.34*   HEMOGLOBIN g/dL 8.8* 8.5* 6.9* 7.6*  --   --  8.3*   HEMATOCRIT % 25.1* 24.2* 19.7* 21.7*  --   --  24.3*   PLATELETS Thousands/uL 186 178 198 209  --   --  245   POTASSIUM mmol/L 4.3 4.8 5.1 5.1 4.7 6.7*  --    CHLORIDE mmol/L 107 107 108 109*  --  108  --    CO2 mmol/L 20* 20* 19* 18*  --  17*  --    BUN mg/dL 39* 38* 37* 33*  --  29*  --    CREATININE mg/dL 3.22* 3.05* 2.81* 2.42*  --  2.43*  --    CALCIUM mg/dL 9.2 8.7 9.0 9.0  --  8.6  --      Portions of the record may have been created with voice recognition software. Occasional wrong word or \"sound a like\" substitutions may have occurred due to the inherent limitations of voice recognition software. Read the chart carefully and recognize, using context, where substitutions have occurred.If you have any questions, please contact the dictating provider.         [1]   Current Facility-Administered Medications:     acetaminophen (TYLENOL) tablet 325 mg, 325 mg, Oral, Q8H PRN, ARIANE Moore, 325 mg at 06/16/25 2308    albumin human (FLEXBUMIN) 25 % injection 25 g, 25 g, Intravenous, Q8H, Kiran Madden MD, 25 g at 06/17/25 0634    amLODIPine (NORVASC) tablet 5 " mg, 5 mg, Oral, BID, Michael Jewell MD, 5 mg at 06/17/25 0753    bisacodyl (DULCOLAX) rectal suppository 10 mg, 10 mg, Rectal, Daily PRN, Jazmín Buchanan MD    carvedilol (COREG) tablet 6.25 mg, 6.25 mg, Oral, BID With Meals, Juan Ramon Manley PA-C, 6.25 mg at 06/17/25 0754    cefTRIAXone (ROCEPHIN) 1,000 mg in dextrose 5 % 50 mL IVPB, 1,000 mg, Intravenous, Q24H, Gwen Boone DO, Last Rate: 100 mL/hr at 06/16/25 1734, 1,000 mg at 06/16/25 1734    docusate sodium (COLACE) capsule 100 mg, 100 mg, Oral, BID, Michael Jewell MD, 100 mg at 06/17/25 0754    folic acid (FOLVITE) tablet 1 mg, 1 mg, Oral, Daily, Michael Jewell MD, 1 mg at 06/17/25 0753    insulin aspart protamine-insulin aspart (NovoLOG 70/30) 100 units/mL subcutaneous injection 8 Units, 8 Units, Subcutaneous, BID AC, Naa Quiles MD, 8 Units at 06/17/25 0754    insulin lispro (HumALOG/ADMELOG) 100 units/mL subcutaneous injection 1-5 Units, 1-5 Units, Subcutaneous, TID AC, 1 Units at 06/17/25 0755 **AND** Fingerstick Glucose (POCT), , , TID AC, Juan Ramon Manley PA-C    insulin lispro (HumALOG/ADMELOG) 100 units/mL subcutaneous injection 1-5 Units, 1-5 Units, Subcutaneous, HS, Juan Ramon Manley PA-C, 3 Units at 06/14/25 2141    [Held by provider] multivitamin-minerals (CENTRUM) tablet 1 tablet, 1 tablet, Oral, Daily, Juan Ramon Manley PA-C    octreotide (SandoSTATIN) injection 100 mcg, 100 mcg, Subcutaneous, Q8H JAISON, Kiran Madden MD, 100 mcg at 06/17/25 0635    pancrelipase (Lip-Prot-Amyl) (CREON) delayed release capsule 24,000 Units, 24,000 Units, Oral, TID With Meals, Michael Jewell MD, 24,000 Units at 06/17/25 0754    pantoprazole (PROTONIX) EC tablet 40 mg, 40 mg, Oral, BID AC, Naa Quiles MD, 40 mg at 06/17/25 0635    polyethylene glycol (MIRALAX) packet 17 g, 17 g, Oral, Daily PRN, Juan Ramon Manley PA-C    polyethylene glycol (MIRALAX) packet 17 g, 17 g, Oral, Daily,  Vishnu Larsen MD, 17 g at 06/17/25 0754    senna (SENOKOT) tablet 17.2 mg, 2 tablet, Oral, HS, Michael Jewell MD, 17.2 mg at 06/16/25 2129    sodium bicarbonate tablet 1,300 mg, 1,300 mg, Oral, TID after meals, Juan Ramon Manley PA-C, 1,300 mg at 06/17/25 0753    thiamine tablet 100 mg, 100 mg, Oral, Daily, Michael Jewell MD, 100 mg at 06/17/25 0753    trimethobenzamide (TIGAN) IM injection 200 mg, 200 mg, Intramuscular, Q6H PRN, Juan Ramon Manley PA-C, 200 mg at 06/13/25 0763

## 2025-06-17 NOTE — ASSESSMENT & PLAN NOTE
BP is controlled.   Current Rx: Amlodipine 5 mg BID, Carvedilol 6.25 mg BID.   Hydralazine stopped on 6/16/25  No changes today.

## 2025-06-17 NOTE — ASSESSMENT & PLAN NOTE
Malnutrition Findings:   Adult Malnutrition type: Acute illness  Adult Degree of Malnutrition: Other severe protein calorie malnutrition  Malnutrition Characteristics: Fat loss, Muscle loss, Inadequate energy     360 Statement: Severe calorie protein malnutrition in context of acute illness r/t inadequte PO intake in setting of abdominal distention/ pain as evidenced by  moderate body fat (triceps, ribcage area) and muscle mass depletions (temporal wasting, protruding clavicles) energy intake less than 50% compared to estimated needs>5 days; Treated with oral supplements    BMI Findings:    Body mass index is 21 kg/m².

## 2025-06-17 NOTE — ASSESSMENT & PLAN NOTE
Lab Results   Component Value Date    HGBA1C 9.6 (H) 05/26/2025       Recent Labs     06/16/25 2038 06/16/25 2106 06/17/25 0652 06/17/25  1101   POCGLU 68 73 168* 142*       HHNK on his previous admission due to noncompliance and alcohol abuse  Home regimen is 70/30, 8 units twice daily, which was held due to coffee-ground emesis  Restarted 70/30 at 8 units twice daily on 6/14

## 2025-06-18 ENCOUNTER — APPOINTMENT (INPATIENT)
Dept: RADIOLOGY | Facility: HOSPITAL | Age: 55
DRG: 280 | End: 2025-06-18
Payer: COMMERCIAL

## 2025-06-18 ENCOUNTER — APPOINTMENT (INPATIENT)
Dept: RADIOLOGY | Facility: HOSPITAL | Age: 55
DRG: 280 | End: 2025-06-18
Attending: NURSE PRACTITIONER
Payer: COMMERCIAL

## 2025-06-18 LAB
ALBUMIN FLD-MCNC: 1.7 G/DL
ALBUMIN SERPL BCG-MCNC: 3.7 G/DL (ref 3.5–5)
ALP SERPL-CCNC: 267 U/L (ref 34–104)
ALT SERPL W P-5'-P-CCNC: 22 U/L (ref 7–52)
ANION GAP SERPL CALCULATED.3IONS-SCNC: 12 MMOL/L (ref 4–13)
APPEARANCE FLD: CLEAR
AST SERPL W P-5'-P-CCNC: 35 U/L (ref 13–39)
BACTERIA UR QL AUTO: ABNORMAL /HPF
BILIRUB SERPL-MCNC: 13.6 MG/DL (ref 0.2–1)
BILIRUB UR QL STRIP: ABNORMAL
BUN SERPL-MCNC: 43 MG/DL (ref 5–25)
CALCIUM SERPL-MCNC: 9 MG/DL (ref 8.4–10.2)
CHLORIDE SERPL-SCNC: 104 MMOL/L (ref 96–108)
CLARITY UR: CLEAR
CO2 SERPL-SCNC: 19 MMOL/L (ref 21–32)
COLOR FLD: YELLOW
COLOR UR: ABNORMAL
CREAT SERPL-MCNC: 3.29 MG/DL (ref 0.6–1.3)
GFR SERPL CREATININE-BSD FRML MDRD: 19 ML/MIN/1.73SQ M
GLUCOSE FLD-MCNC: 226 MG/DL
GLUCOSE SERPL-MCNC: 136 MG/DL (ref 65–140)
GLUCOSE SERPL-MCNC: 207 MG/DL (ref 65–140)
GLUCOSE SERPL-MCNC: 228 MG/DL (ref 65–140)
GLUCOSE SERPL-MCNC: 247 MG/DL (ref 65–140)
GLUCOSE SERPL-MCNC: 266 MG/DL (ref 65–140)
GLUCOSE UR STRIP-MCNC: NEGATIVE MG/DL
HGB UR QL STRIP.AUTO: NEGATIVE
HISTIOCYTES NFR FLD: 26 %
HYALINE CASTS #/AREA URNS LPF: ABNORMAL /LPF
INR PPP: 1.29 (ref 0.85–1.19)
KETONES UR STRIP-MCNC: NEGATIVE MG/DL
LDH FLD L TO P-CCNC: 46 U/L
LEUKOCYTE ESTERASE UR QL STRIP: NEGATIVE
LYMPHOCYTES NFR BLD AUTO: 47 %
NEUTS SEG NFR BLD AUTO: 27 %
NITRITE UR QL STRIP: NEGATIVE
NON-SQ EPI CELLS URNS QL MICRO: ABNORMAL /HPF
PH UR STRIP.AUTO: 6 [PH]
POTASSIUM SERPL-SCNC: 5.3 MMOL/L (ref 3.5–5.3)
PROT FLD-MCNC: <3 G/DL
PROT SERPL-MCNC: 5.7 G/DL (ref 6.4–8.4)
PROT UR STRIP-MCNC: ABNORMAL MG/DL
PROTHROMBIN TIME: 16.3 SECONDS (ref 12.3–15)
RBC #/AREA URNS AUTO: ABNORMAL /HPF
SITE: NORMAL
SODIUM SERPL-SCNC: 135 MMOL/L (ref 135–147)
SODIUM UR-SCNC: 57 MMOL/L
SP GR UR STRIP.AUTO: 1.02 (ref 1–1.03)
TOTAL CELLS COUNTED SPEC: 100
TOTAL PROTEIN FLUID: 0.7 G/DL
UROBILINOGEN UR STRIP-ACNC: 2 MG/DL
WBC # FLD MANUAL: 140 /UL
WBC #/AREA URNS AUTO: ABNORMAL /HPF

## 2025-06-18 PROCEDURE — 74018 RADEX ABDOMEN 1 VIEW: CPT

## 2025-06-18 PROCEDURE — 99232 SBSQ HOSP IP/OBS MODERATE 35: CPT | Performed by: INTERNAL MEDICINE

## 2025-06-18 PROCEDURE — 89051 BODY FLUID CELL COUNT: CPT | Performed by: NURSE PRACTITIONER

## 2025-06-18 PROCEDURE — 81001 URINALYSIS AUTO W/SCOPE: CPT | Performed by: INTERNAL MEDICINE

## 2025-06-18 PROCEDURE — 82042 OTHER SOURCE ALBUMIN QUAN EA: CPT | Performed by: NURSE PRACTITIONER

## 2025-06-18 PROCEDURE — 0W9G3ZZ DRAINAGE OF PERITONEAL CAVITY, PERCUTANEOUS APPROACH: ICD-10-PCS | Performed by: RADIOLOGY

## 2025-06-18 PROCEDURE — 49083 ABD PARACENTESIS W/IMAGING: CPT | Performed by: NURSE PRACTITIONER

## 2025-06-18 PROCEDURE — 82948 REAGENT STRIP/BLOOD GLUCOSE: CPT

## 2025-06-18 PROCEDURE — 87205 SMEAR GRAM STAIN: CPT | Performed by: NURSE PRACTITIONER

## 2025-06-18 PROCEDURE — 82945 GLUCOSE OTHER FLUID: CPT | Performed by: NURSE PRACTITIONER

## 2025-06-18 PROCEDURE — 85610 PROTHROMBIN TIME: CPT | Performed by: INTERNAL MEDICINE

## 2025-06-18 PROCEDURE — 87070 CULTURE OTHR SPECIMN AEROBIC: CPT | Performed by: NURSE PRACTITIONER

## 2025-06-18 PROCEDURE — 49083 ABD PARACENTESIS W/IMAGING: CPT

## 2025-06-18 PROCEDURE — 80053 COMPREHEN METABOLIC PANEL: CPT | Performed by: INTERNAL MEDICINE

## 2025-06-18 PROCEDURE — 84300 ASSAY OF URINE SODIUM: CPT | Performed by: INTERNAL MEDICINE

## 2025-06-18 PROCEDURE — 84157 ASSAY OF PROTEIN OTHER: CPT | Performed by: NURSE PRACTITIONER

## 2025-06-18 PROCEDURE — 83615 LACTATE (LD) (LDH) ENZYME: CPT | Performed by: NURSE PRACTITIONER

## 2025-06-18 PROCEDURE — 80321 ALCOHOLS BIOMARKERS 1OR 2: CPT | Performed by: STUDENT IN AN ORGANIZED HEALTH CARE EDUCATION/TRAINING PROGRAM

## 2025-06-18 RX ORDER — POLYETHYLENE GLYCOL 3350 17 G/17G
238 POWDER, FOR SOLUTION ORAL ONCE
Status: DISCONTINUED | OUTPATIENT
Start: 2025-06-18 | End: 2025-06-18

## 2025-06-18 RX ORDER — ALBUMIN (HUMAN) 12.5 G/50ML
25 SOLUTION INTRAVENOUS ONCE
Status: COMPLETED | OUTPATIENT
Start: 2025-06-18 | End: 2025-06-18

## 2025-06-18 RX ORDER — LIDOCAINE WITH 8.4% SOD BICARB 0.9%(10ML)
SYRINGE (ML) INJECTION AS NEEDED
Status: COMPLETED | OUTPATIENT
Start: 2025-06-18 | End: 2025-06-18

## 2025-06-18 RX ORDER — SENNOSIDES 8.6 MG
2 TABLET ORAL 2 TIMES DAILY
Status: DISCONTINUED | OUTPATIENT
Start: 2025-06-18 | End: 2025-06-19

## 2025-06-18 RX ORDER — ALBUMIN (HUMAN) 12.5 G/50ML
50 SOLUTION INTRAVENOUS ONCE
Status: DISCONTINUED | OUTPATIENT
Start: 2025-06-18 | End: 2025-06-18

## 2025-06-18 RX ORDER — POLYETHYLENE GLYCOL 3350 17 G/17G
17 POWDER, FOR SOLUTION ORAL 2 TIMES DAILY
Status: DISCONTINUED | OUTPATIENT
Start: 2025-06-18 | End: 2025-06-18

## 2025-06-18 RX ADMIN — AMLODIPINE BESYLATE 5 MG: 5 TABLET ORAL at 18:01

## 2025-06-18 RX ADMIN — INSULIN LISPRO 1 UNITS: 100 INJECTION, SOLUTION INTRAVENOUS; SUBCUTANEOUS at 22:06

## 2025-06-18 RX ADMIN — THIAMINE HCL TAB 100 MG 100 MG: 100 TAB at 09:14

## 2025-06-18 RX ADMIN — FOLIC ACID 1 MG: 1 TABLET ORAL at 09:14

## 2025-06-18 RX ADMIN — ALBUMIN (HUMAN) 25 G: 0.25 INJECTION, SOLUTION INTRAVENOUS at 06:11

## 2025-06-18 RX ADMIN — ALBUMIN (HUMAN) 25 G: 0.25 INJECTION, SOLUTION INTRAVENOUS at 14:17

## 2025-06-18 RX ADMIN — SODIUM BICARBONATE 650 MG TABLET 1300 MG: at 09:14

## 2025-06-18 RX ADMIN — OCTREOTIDE ACETATE 100 MCG: 100 INJECTION, SOLUTION INTRAVENOUS; SUBCUTANEOUS at 06:11

## 2025-06-18 RX ADMIN — PANTOPRAZOLE SODIUM 40 MG: 40 TABLET, DELAYED RELEASE ORAL at 06:11

## 2025-06-18 RX ADMIN — CARVEDILOL 6.25 MG: 6.25 TABLET, FILM COATED ORAL at 09:14

## 2025-06-18 RX ADMIN — POLYETHYLENE GLYCOL 3350 17 G: 17 POWDER, FOR SOLUTION ORAL at 09:14

## 2025-06-18 RX ADMIN — SODIUM BICARBONATE 650 MG TABLET 1300 MG: at 12:11

## 2025-06-18 RX ADMIN — ALBUMIN (HUMAN) 25 G: 0.25 INJECTION, SOLUTION INTRAVENOUS at 22:11

## 2025-06-18 RX ADMIN — PANCRELIPASE 24000 UNITS: 120000; 24000; 76000 CAPSULE, DELAYED RELEASE PELLETS ORAL at 12:12

## 2025-06-18 RX ADMIN — AMLODIPINE BESYLATE 5 MG: 5 TABLET ORAL at 09:14

## 2025-06-18 RX ADMIN — DOCUSATE SODIUM 100 MG: 100 CAPSULE, LIQUID FILLED ORAL at 09:14

## 2025-06-18 RX ADMIN — Medication 10 ML: at 14:52

## 2025-06-18 RX ADMIN — INSULIN ASPART 8 UNITS: 100 INJECTION, SUSPENSION SUBCUTANEOUS at 09:15

## 2025-06-18 RX ADMIN — PANTOPRAZOLE SODIUM 40 MG: 40 TABLET, DELAYED RELEASE ORAL at 16:25

## 2025-06-18 RX ADMIN — PANCRELIPASE 24000 UNITS: 120000; 24000; 76000 CAPSULE, DELAYED RELEASE PELLETS ORAL at 16:25

## 2025-06-18 RX ADMIN — PANCRELIPASE 24000 UNITS: 120000; 24000; 76000 CAPSULE, DELAYED RELEASE PELLETS ORAL at 09:14

## 2025-06-18 RX ADMIN — INSULIN LISPRO 2 UNITS: 100 INJECTION, SOLUTION INTRAVENOUS; SUBCUTANEOUS at 09:15

## 2025-06-18 RX ADMIN — ALBUMIN (HUMAN) 25 G: 0.25 INJECTION, SOLUTION INTRAVENOUS at 18:00

## 2025-06-18 RX ADMIN — OCTREOTIDE ACETATE 100 MCG: 100 INJECTION, SOLUTION INTRAVENOUS; SUBCUTANEOUS at 22:07

## 2025-06-18 RX ADMIN — OCTREOTIDE ACETATE 100 MCG: 100 INJECTION, SOLUTION INTRAVENOUS; SUBCUTANEOUS at 14:17

## 2025-06-18 RX ADMIN — CARVEDILOL 6.25 MG: 6.25 TABLET, FILM COATED ORAL at 16:35

## 2025-06-18 RX ADMIN — POLYETHYLENE GLYCOL 3350, SODIUM SULFATE ANHYDROUS, SODIUM BICARBONATE, SODIUM CHLORIDE, POTASSIUM CHLORIDE 4000 ML: 236; 22.74; 6.74; 5.86; 2.97 POWDER, FOR SOLUTION ORAL at 16:24

## 2025-06-18 RX ADMIN — SODIUM BICARBONATE 650 MG TABLET 1300 MG: at 16:25

## 2025-06-18 RX ADMIN — INSULIN LISPRO 2 UNITS: 100 INJECTION, SOLUTION INTRAVENOUS; SUBCUTANEOUS at 12:12

## 2025-06-18 NOTE — ASSESSMENT & PLAN NOTE
GI following.  Had paracentesis on 6/13/2025 with 1670 cc drained.  For repeat paracentesis today.

## 2025-06-18 NOTE — ASSESSMENT & PLAN NOTE
55-year-old male with a past medical history of hypertension, diabetes mellitus type 2, chronic pancreatitis secondary to chronic alcohol use and recent admission for alc hep with likely ZENAIDA on CKD (creatinine 3.01 at discharge during previous hospitalization, previously 0.8-1 in 2024 but more recently 1.7-2) who presented to [facility] on 6/12 for concern of recurrent abdominal distension with associated pain in the setting of constipation.   Labs on presentation significant for continued improvement in AST/ALT/ALP since last admission, however, bilirubin continues to increase.  INR 1.18 on presentation (peaked last admission at 5.53 with dramatic improvement after Vitamin K). Kidney function appeared to be improving from previous admission at which time creatinine peaked at 3.01, but is now worsening rising to 3.29 today.    Patient previously underwent serologic liver workup negative for alternative cause of liver injury with elevations thought to be 2/2 alc hep in setting of some degree of underlying fibrosis.  Patient was treated with conservative management without steroids due to low Maddrey's score.  Patient was recommended to follow-up with gastroenterology but now presenting with recurrent abdominal distention/pain along with coffee-ground emesis and melena on admission leading to EGD without evidence of varices, but showing grade D esophagitis.  Hospitalization further complicated by continued abdominal pain likely in the setting of constipation as well as worsening ZENAIDA with concern for HRS now on octreotide and albumin.    Maddrey's Discriminant Function - Control Prothrombin 13: 28.78 at 6/18/2025  6:24 AM  Calculated from:  Total Bilirubin: 13.6 mg/dL at 6/18/2025  6:24 AM  Prothrombin Time: 16.3 seconds at 6/18/2025  6:24 AM  MDF = (4.6 * (PT - Control PT)) + Bilirubin     MELD 3.0: 33 at 6/18/2025  6:24 AM  MELD-Na: 32 at 6/18/2025  6:24 AM  Calculated from:  Serum Creatinine: 3.29 mg/dL (Using max of 3  mg/dL) at 6/18/2025  6:24 AM  Serum Sodium: 135 mmol/L at 6/18/2025  6:24 AM  Total Bilirubin: 13.6 mg/dL at 6/18/2025  6:24 AM  Serum Albumin: 3.7 g/dL (Using max of 3.5 g/dL) at 6/18/2025  6:24 AM  INR(ratio): 1.29 at 6/18/2025  6:24 AM  Age at listing (hypothetical): 55 years  Sex: Male at 6/18/2025  6:24 AM    # Elevated Liver Chemistries - Alcohol hepatitis:   Mild downtrend in LFTs today: AST/ALT 35/26, , and total bilirubin 12.88  Monitor and trend LFTs daily along with INR; no indication for prednisolone  Avoid hepatotoxic medications, strongly encourage complete alcohol cessation  Consider liver biopsy outpatient given significant alkaline phosphatase elevation prior to admission  Recommend complete alcohol cessation moving forward.  Will check Peth this patient states he has not consumed alcohol since before hospitalization last month when he presented on 5/23  Additional pain and symptom management per primary team    # Ascites with ZENAIDA/CKD:   Status post IR paracentesis on 6/13/2025 with removal of 1670 cc, negative for SBP  Studies not consistent with SBP however patient with increasing serum creatinine  Began on 25% albumin 25 g x3 doses due to worsening creatinine; diuretics currently on hold.  No need for midodrine as MAPs adequate  Nephrology following  Monitor and trend serum creatinine and electrolytes daily, monitor urine output  Plan for repeat paracentesis today.  Will repeat fluid studies to rule out SBP given worsening creatinine.  Currently on albumin    # Coffee Ground Emesis and Melena:  Status post EGD 6/13/2025 with evidence of grade D esophagitis; history of gastric dieulafoy lesion as well 2/2025  Continue on Protonix 40 mg twice daily by mouth  Status post ceftriaxone x 5 days for SBP prophylaxis in setting of GI bleed and severe alcohol hepatitis  Monitor and trend hemoglobin, transfuse for goal greater than 7  Alert GI team of any concern for recurrent GI bleeding    #  Transplant  Patient with history of alcoholic hepatitis and now concern for HRS which may potentially need for transplant.  He has recent alcohol use within the past 3 weeks  Check path  Discussed concern with family at bedside including parents and brother.  He has a good support system from all family members including parents who he lives with.  They have encouraged him recently to stop alcohol consumption and he is now adamant that he will not drink again  No previous DUIs or legal trouble associated with drinking.  No previous stays in alcohol rehabilitation

## 2025-06-18 NOTE — ASSESSMENT & PLAN NOTE
Lab Results   Component Value Date    HGBA1C 9.6 (H) 05/26/2025       Recent Labs     06/17/25  2101 06/18/25  0730 06/18/25  1146 06/18/25  1605   POCGLU 245* 266* 228* 136       HHNK on his previous admission due to noncompliance and alcohol abuse  Home regimen is 70/30, 8 units twice daily, which was held due to coffee-ground emesis  Restarted 70/30 at 8 units twice daily on 6/14

## 2025-06-18 NOTE — ASSESSMENT & PLAN NOTE
BP is acceptable.   Current Rx: Amlodipine 5 mg BID, Carvedilol 6.25 mg BID.   Hydralazine stopped on 6/16/25  Continue same meds.

## 2025-06-18 NOTE — ASSESSMENT & PLAN NOTE
Baseline creatinine was around 1.9-2.1 in April to May 2025.  During recent admission to [facility] from May to early June 2025, creatinine was around 2.9-3.0 on discharge.  SCr 3.01 on 6/6/2025.  Admission creatinine 2.43 on 6/12/25.   Renal function worsening since admission.  SCr up to 3.29 today.  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   Doubt prerenal azotemia since no improvement with albumin. No hydronephrosis on CT.   Overall, renal function is stable over the past 3 days.  Follow up UA and urine Na - ordered but not sent.   Continue Albumin 25 gm every 8 hours.   Continue Octreotide 100 mcg q8H.

## 2025-06-18 NOTE — ASSESSMENT & PLAN NOTE
55-year-old male with a past medical history of hypertension, CKD, diabetes mellitus type 2, chronic pancreatitis secondary to chronic alcohol use, and recent admission for alc hep with ZENAIDA on CKD who presented to [facility] on 6/12 for concern of recurrent abdominal distension with associated pain.   Labs on presentation significant for continued improvement in AST/ALT/ALP since last admission, however, bilirubin continues to increase.  INR 1.18 on presentation (peaked last admission at 5.53 with dramatic improvement after Vitamin K). Kidney function appeared to be improving from previous admission at which time creatinine peaked at 3.01, but is now worsening rising to 3.29 today.    Patient previously underwent serologic liver workup negative for alternative cause of liver injury with elevations thought to be 2/2 alc hep in setting of some degree of underlying fibrosis.  Patient was treated with conservative management without steroids due to low Madrey's score.  Patient was recommended to follow-up with gastroenterology but now presenting with recurrent abdominal distention/pain along with coffee-ground emesis and melena on admission leading to EGD without evidence of varices, but showing grade D esophagitis.  Hospitalization further complicated by continued abdominal pain likely in the setting of constipation as well as worsening ZENAIDA with concern for HRS now on octreotide and albumin.    Maddrey's Discriminant Function - Control Prothrombin 13: 28.78 at 6/18/2025  6:24 AM  Calculated from:  Total Bilirubin: 13.6 mg/dL at 6/18/2025  6:24 AM  Prothrombin Time: 16.3 seconds at 6/18/2025  6:24 AM  MDF = (4.6 * (PT - Control PT)) + Bilirubin     MELD 3.0: 33 at 6/18/2025  6:24 AM  MELD-Na: 32 at 6/18/2025  6:24 AM  Calculated from:  Serum Creatinine: 3.29 mg/dL (Using max of 3 mg/dL) at 6/18/2025  6:24 AM  Serum Sodium: 135 mmol/L at 6/18/2025  6:24 AM  Total Bilirubin: 13.6 mg/dL at 6/18/2025  6:24 AM  Serum Albumin: 3.7  g/dL (Using max of 3.5 g/dL) at 6/18/2025  6:24 AM  INR(ratio): 1.29 at 6/18/2025  6:24 AM  Age at listing (hypothetical): 55 years  Sex: Male at 6/18/2025  6:24 AM    # Elevated Liver Chemistries - Alcohol hepatitis:   Mild downtrend in LFTs today: AST/ALT 35/26, , and total bilirubin 12.88  Monitor and trend LFTs daily along with INR; no indication for prednisolone  Avoid hepatotoxic medications, strongly encourage complete alcohol cessation  Consider liver biopsy outpatient given significant alkaline phosphatase elevation prior to admission  Recommend complete alcohol cessation moving forward.  Will check Peth this patient states he has not consumed alcohol since before hospitalization last month when he presented on 5/23  Additional pain and symptom management per primary team    # Ascites with ZENAIDA/CKD:   Status post IR paracentesis on 6/13/2025 with removal of 1670 cc, negative for SBP  Studies not consistent with SBP however patient with increasing serum creatinine  Began on 25% albumin 25 g x3 doses due to worsening creatinine; diuretics currently on hold.  No need for midodrine as MAPs adequate  Nephrology following  Monitor and trend serum creatinine and electrolytes daily, monitor urine output    # Coffee Ground Emesis and Melena:  Status post EGD 6/13/2025 with evidence of grade D esophagitis; history of gastric dieulafoy lesion as well 2/2025  Continue on Protonix 40 mg twice daily by mouth  Status post ceftriaxone x 5 days for SBP prophylaxis in setting of GI bleed and severe alcohol hepatitis  Monitor and trend hemoglobin, transfuse for goal greater than 7  Alert GI team of any concern for recurrent GI bleeding

## 2025-06-18 NOTE — CASE MANAGEMENT
Case Management Discharge Planning Note    Patient name Wes Araujo  Location [place] [address] /[address] M* MRN [id number]  : 1970 Date 2025       Current Admission Date: 2025  Current Admission Diagnosis:Alcoholic hepatitis with ascites   Patient Active Problem List    Diagnosis Date Noted    Severe protein-calorie malnutrition (HCC) 2025    CKD (chronic kidney disease) stage 4, GFR 15-29 ml/min (HCC) 2025    Alcohol abuse 2025    Constipation 2025    Coffee ground emesis 2025    Melena 2025    Chronic bilateral low back pain with bilateral sciatica 2025    Hyperkalemia 2025    Acute kidney injury (HCC) 2025    CKD stage 3b, GFR 30-44 ml/min (HCC) 2025    Other specified anemias 2025    Elevated LFTs 2025    Brazilian  needed 2025    Electrolyte abnormality 2025    Abnormal LFTs 2025    Abdominal pain 2025    CKD stage 3a, GFR 45-59 ml/min (HCC) 2025    Alcohol-induced chronic pancreatitis (HCC) 2024    Toxic metabolic encephalopathy 2024    Seizure (HCC) 2022    Generalized weakness 2022    Bilateral hearing loss 2020    Acute renal failure superimposed on stage 3 chronic kidney disease (HCC) 2019    Anemia 2019    Hyponatremia 2019    History of atrial fibrillation 2019    Metabolic acidosis 2019    ZENAIDA (acute kidney injury) (HCC) 2019    Primary hypertension     Type 2 diabetes mellitus with hyperglycemia, with long-term current use of insulin (HCC)     Alcoholic hepatitis with ascites     Paroxysmal A-fib (HCC)     Chronic pancreatitis (HCC)       LOS (days): 6  Geometric Mean LOS (GMLOS) (days): 4.9  Days to GMLOS:-0.7     OBJECTIVE:  Risk of Unplanned Readmission Score: 42.3         Current admission status: Inpatient   Preferred Pharmacy:    [facility] [place] - [address] [place] [address]  [facility] [address]  Phone: [phone] Fax: [phone]    [facility] - [place] - [address]  [facility],  [address]  [facility],  First Floor [facility] [address]  Phone: [phone] Fax: [phone]    [id number] - [place] - [address] [facility]  [address] [facility] [address]  Phone: [phone] Fax: [phone]    Primary Care Provider: Rush Kebede MD    Primary Insurance: [employer]  Secondary Insurance:     DISCHARGE DETAILS:     Additional Comments: Patient reviewed during care coordination rounds, pt to get repeat paracentesis today and will likely require an additional 48 hours inpatient. Plan remains for discharge home with [facility], RW has been delivered to bedside. CM department to follow.

## 2025-06-18 NOTE — ASSESSMENT & PLAN NOTE
Recent hemoglobin in the range of 7-9  Patient presented with abdominal pain, had coffee-ground emesis and melena on this admission  Underwent EGD which showed severe esophagitis most likely the cause of the coffee-ground emesis and melena  Received a unit of blood on 6/14  Hemoglobin stable

## 2025-06-18 NOTE — ASSESSMENT & PLAN NOTE
55-year-old male with a past medical history of hypertension, diabetes mellitus type 2, chronic pancreatitis secondary to chronic alcohol use and recent admission for alc hep with likely ZENAIDA on CKD (creatinine 3.01 at discharge during previous hospitalization, previously 0.8-1 in 2024 but more recently 1.7-2) who presented to [facility] on 6/12 for concern of recurrent abdominal distension with associated pain in the setting of constipation.   Labs on presentation significant for continued improvement in AST/ALT/ALP since last admission, however, bilirubin continues to increase.  INR 1.18 on presentation (peaked last admission at 5.53 with dramatic improvement after Vitamin K). Kidney function appeared to be improving from previous admission at which time creatinine peaked at 3.01, but is now worsening rising to 3.29 today.    Patient previously underwent serologic liver workup negative for alternative cause of liver injury with elevations thought to be 2/2 alc hep in setting of some degree of underlying fibrosis.  Patient was treated with conservative management without steroids due to low Maddrey's score.  Patient was recommended to follow-up with gastroenterology but now presenting with recurrent abdominal distention/pain along with coffee-ground emesis and melena on admission leading to EGD without evidence of varices, but showing grade D esophagitis.  Hospitalization further complicated by continued abdominal pain likely in the setting of constipation as well as worsening ZENAIDA with concern for HRS now on octreotide and albumin.    Maddrey's Discriminant Function - Control Prothrombin 13: 28.78 at 6/18/2025  6:24 AM  Calculated from:  Total Bilirubin: 13.6 mg/dL at 6/18/2025  6:24 AM  Prothrombin Time: 16.3 seconds at 6/18/2025  6:24 AM  MDF = (4.6 * (PT - Control PT)) + Bilirubin     MELD 3.0: 33 at 6/18/2025  6:24 AM  MELD-Na: 32 at 6/18/2025  6:24 AM  Calculated from:  Serum Creatinine: 3.29 mg/dL (Using max of 3  mg/dL) at 6/18/2025  6:24 AM  Serum Sodium: 135 mmol/L at 6/18/2025  6:24 AM  Total Bilirubin: 13.6 mg/dL at 6/18/2025  6:24 AM  Serum Albumin: 3.7 g/dL (Using max of 3.5 g/dL) at 6/18/2025  6:24 AM  INR(ratio): 1.29 at 6/18/2025  6:24 AM  Age at listing (hypothetical): 55 years  Sex: Male at 6/18/2025  6:24 AM    # Elevated Liver Chemistries - Alcohol hepatitis:   Mild downtrend in LFTs today: AST/ALT 35/26, , and total bilirubin 12.88  Monitor and trend LFTs daily along with INR; no indication for prednisolone  Avoid hepatotoxic medications, strongly encourage complete alcohol cessation  Consider liver biopsy outpatient given significant alkaline phosphatase elevation prior to admission  Recommend complete alcohol cessation moving forward.  Will check Peth this patient states he has not consumed alcohol since before hospitalization last month when he presented on 5/23  Additional pain and symptom management per primary team    # Ascites with ZENAIDA/CKD:   Status post IR paracentesis on 6/13/2025 with removal of 1670 cc, negative for SBP  Studies not consistent with SBP however patient with increasing serum creatinine  Began on 25% albumin 25 g x3 doses due to worsening creatinine; diuretics currently on hold.  No need for midodrine as MAPs adequate  Nephrology following  Monitor and trend serum creatinine and electrolytes daily, monitor urine output  Plan for repeat paracentesis today.  Will repeat fluid studies to rule out SBP given worsening creatinine.  Currently on albumin    # Coffee Ground Emesis and Melena:  Status post EGD 6/13/2025 with evidence of grade D esophagitis; history of gastric dieulafoy lesion as well 2/2025  Continue on Protonix 40 mg twice daily by mouth  Status post ceftriaxone x 5 days for SBP prophylaxis in setting of GI bleed and severe alcohol hepatitis  Monitor and trend hemoglobin, transfuse for goal greater than 7  Alert GI team of any concern for recurrent GI bleeding    #  Transplant  Patient with history of alcoholic hepatitis and now concern for HRS which may potentially need for transplant.  He has recent alcohol use within the past 3 weeks  Check path  Discussed concern with family at bedside including parents and brother.  He has a good support system from all family members including parents who he lives with.  They have encouraged him recently to stop alcohol consumption and he is now adamant that he will not drink again  No previous DUIs or legal trouble associated with drinking.  No previous stays in alcohol rehabilitation

## 2025-06-18 NOTE — ASSESSMENT & PLAN NOTE
55-year-old male with a past medical history of hypertension, diabetes mellitus type 2, chronic pancreatitis secondary to chronic alcohol use and recent admission for alc hep with likely ZENAIDA on CKD (creatinine 3.01 at discharge during previous hospitalization, previously 0.8-1 in 2024 but more recently 1.7-2) who presented to [facility] on 6/12 for concern of recurrent abdominal distension with associated pain in the setting of constipation.   Labs on presentation significant for continued improvement in AST/ALT/ALP since last admission, however, bilirubin continues to increase.  INR 1.18 on presentation (peaked last admission at 5.53 with dramatic improvement after Vitamin K). Kidney function appeared to be improving from previous admission at which time creatinine peaked at 3.01, but is now worsening rising to 3.29 today.    Patient previously underwent serologic liver workup negative for alternative cause of liver injury with elevations thought to be 2/2 alc hep in setting of some degree of underlying fibrosis.  Patient was treated with conservative management without steroids due to low Maddrey's score.  Patient was recommended to follow-up with gastroenterology but now presenting with recurrent abdominal distention/pain along with coffee-ground emesis and melena on admission leading to EGD without evidence of varices, but showing grade D esophagitis.  Hospitalization further complicated by continued abdominal pain likely in the setting of constipation as well as worsening ZENAIDA with concern for HRS now on octreotide and albumin.    Maddrey's Discriminant Function - Control Prothrombin 13: 28.78 at 6/18/2025  6:24 AM  Calculated from:  Total Bilirubin: 13.6 mg/dL at 6/18/2025  6:24 AM  Prothrombin Time: 16.3 seconds at 6/18/2025  6:24 AM  MDF = (4.6 * (PT - Control PT)) + Bilirubin     MELD 3.0: 33 at 6/18/2025  6:24 AM  MELD-Na: 32 at 6/18/2025  6:24 AM  Calculated from:  Serum Creatinine: 3.29 mg/dL (Using max of 3  mg/dL) at 6/18/2025  6:24 AM  Serum Sodium: 135 mmol/L at 6/18/2025  6:24 AM  Total Bilirubin: 13.6 mg/dL at 6/18/2025  6:24 AM  Serum Albumin: 3.7 g/dL (Using max of 3.5 g/dL) at 6/18/2025  6:24 AM  INR(ratio): 1.29 at 6/18/2025  6:24 AM  Age at listing (hypothetical): 55 years  Sex: Male at 6/18/2025  6:24 AM    # Elevated Liver Chemistries - Alcohol hepatitis:   Mild downtrend in LFTs today: AST/ALT 35/26, , and total bilirubin 12.88  Monitor and trend LFTs daily along with INR; no indication for prednisolone  Avoid hepatotoxic medications, strongly encourage complete alcohol cessation  Consider liver biopsy outpatient given significant alkaline phosphatase elevation prior to admission  Recommend complete alcohol cessation moving forward.  Will check Peth this patient states he has not consumed alcohol since before hospitalization last month when he presented on 5/23  Additional pain and symptom management per primary team    # Ascites with ZENAIDA/CKD:   Status post IR paracentesis on 6/13/2025 with removal of 1670 cc, negative for SBP  Studies not consistent with SBP however patient with increasing serum creatinine  Began on 25% albumin 25 g x3 doses due to worsening creatinine; diuretics currently on hold.  No need for midodrine as MAPs adequate  Nephrology following  Monitor and trend serum creatinine and electrolytes daily, monitor urine output  Plan for repeat paracentesis today.  Will repeat fluid studies to rule out SBP given worsening creatinine.  Currently on albumin    # Coffee Ground Emesis and Melena:  Status post EGD 6/13/2025 with evidence of grade D esophagitis; history of gastric dieulafoy lesion as well 2/2025  Continue on Protonix 40 mg twice daily by mouth  Status post ceftriaxone x 5 days for SBP prophylaxis in setting of GI bleed and severe alcohol hepatitis  Monitor and trend hemoglobin, transfuse for goal greater than 7  Alert GI team of any concern for recurrent GI bleeding    #  Transplant  Patient with history of alcoholic hepatitis and now concern for HRS which may potentially need for transplant.  He has recent alcohol use within the past 3 weeks  Check path  Discussed concern with family at bedside including parents and brother.  He has a good support system from all family members including parents who he lives with.  They have encouraged him recently to stop alcohol consumption and he is now adamant that he will not drink again  No previous DUIs or legal trouble associated with drinking.  No previous stays in alcohol rehabilitation

## 2025-06-18 NOTE — BRIEF OP NOTE (RAD/CATH)
Procedure Note    PATIENT NAME: Wes Araujo  : 1970  MRN: [id number]    Pre-op Diagnosis:   1. Ascites    2. Jaundice    3. Coffee ground emesis    4. Melena    5. Alcoholic hepatitis with ascites    6. Acute kidney injury (HCC)      Post-op Diagnosis:   1. Ascites    2. Jaundice    3. Coffee ground emesis    4. Melena    5. Alcoholic hepatitis with ascites    6. Acute kidney injury (HCC)        Provider:   ARIANE Combs  Assistants:     No qualified resident was available.    Estimated Blood Loss: none    Findings: 5700 ml of clear yellow fluid obtained from right paracentesis    Specimens: labs collected and sent    Complications:  none immediately    Anesthesia: local    ARIANE Combs     Date: 2025  Time: 3:06 PM

## 2025-06-18 NOTE — ASSESSMENT & PLAN NOTE
Metabolic acidosis:  CO2 is 19 today - worse because refused meds yesterday.   Continue sodium bicarbonate 1300 mg TID

## 2025-06-18 NOTE — ASSESSMENT & PLAN NOTE
Patient is a 55-year-old male with a past medical history of alcohol abuse, alcoholic liver cirrhosis, anemia, hypertension, type 2 diabetes, history of atrial fibrillation, chronic pancreatitis who presented to the hospital with abdominal pain and distention.  Overnight on the day of admission developed coffee-ground emesis and melena  Patient was admitted from 5/23/2025 to 6/6/2025 secondary to toxic metabolic encephalopathy secondary to hyperglycemia, HHNK, kidney injury and acute liver failure.  Received insulin, IV fluids, seen by gastroenterology, DF did not meet criteria for steroids, infection was ruled out, initial plan was for patient to be discharged to inpatient alcoholic rehab but patient refused and he was discharged home    6/12/2025 CT showed large volume ascites, increased since 6/3/2025   6/13/2025 underwent paracentesis 1670 cc clear fluid removed, no evidence of SBP, fluid cultures are pending  6/13/2025 underwent EGD which showed esophagitis.  No esophageal or gastric varices.  Ceftriaxone for 5 days for SBP prophylaxis in the setting of GI bleed  No indication for prednisolone  Due to worsening of kidney function the patient has been treated with albumin and octreotide.  Followed by GI  The patient underwent paracentesis today.  5700 cc of fluid was withdrawn.

## 2025-06-18 NOTE — ASSESSMENT & PLAN NOTE
55-year-old male with a past medical history of hypertension, CKD, diabetes mellitus type 2, chronic pancreatitis secondary to chronic alcohol use, and recent admission for alc hep with ZENAIDA on CKD who presented to [facility] on 6/12 for concern of recurrent abdominal distension with associated pain.   Labs on presentation significant for continued improvement in AST/ALT/ALP since last admission, however, bilirubin continues to increase.  INR 1.18 on presentation (peaked last admission at 5.53 with dramatic improvement after Vitamin K). Kidney function appeared to be improving from previous admission at which time creatinine peaked at 3.01, but is now worsening rising to 3.29 today.    Patient previously underwent serologic liver workup negative for alternative cause of liver injury with elevations thought to be 2/2 alc hep in setting of some degree of underlying fibrosis.  Patient was treated with conservative management without steroids due to low Madrey's score.  Patient was recommended to follow-up with gastroenterology but now presenting with recurrent abdominal distention/pain along with coffee-ground emesis and melena on admission leading to EGD without evidence of varices, but showing grade D esophagitis.  Hospitalization further complicated by continued abdominal pain likely in the setting of constipation as well as worsening ZENAIDA with concern for HRS now on octreotide and albumin.    Maddrey's Discriminant Function - Control Prothrombin 13: 28.78 at 6/18/2025  6:24 AM  Calculated from:  Total Bilirubin: 13.6 mg/dL at 6/18/2025  6:24 AM  Prothrombin Time: 16.3 seconds at 6/18/2025  6:24 AM  MDF = (4.6 * (PT - Control PT)) + Bilirubin     MELD 3.0: 33 at 6/18/2025  6:24 AM  MELD-Na: 32 at 6/18/2025  6:24 AM  Calculated from:  Serum Creatinine: 3.29 mg/dL (Using max of 3 mg/dL) at 6/18/2025  6:24 AM  Serum Sodium: 135 mmol/L at 6/18/2025  6:24 AM  Total Bilirubin: 13.6 mg/dL at 6/18/2025  6:24 AM  Serum Albumin: 3.7  g/dL (Using max of 3.5 g/dL) at 6/18/2025  6:24 AM  INR(ratio): 1.29 at 6/18/2025  6:24 AM  Age at listing (hypothetical): 55 years  Sex: Male at 6/18/2025  6:24 AM    # Elevated Liver Chemistries - Alcohol hepatitis:   Mild downtrend in LFTs today: AST/ALT 35/26, , and total bilirubin 12.88  Monitor and trend LFTs daily along with INR; no indication for prednisolone  Avoid hepatotoxic medications, strongly encourage complete alcohol cessation  Consider liver biopsy outpatient given significant alkaline phosphatase elevation prior to admission  Recommend complete alcohol cessation moving forward.  Will check Peth this patient states he has not consumed alcohol since before hospitalization last month when he presented on 5/23  Additional pain and symptom management per primary team    # Ascites with ZENAIDA/CKD:   Status post IR paracentesis on 6/13/2025 with removal of 1670 cc, negative for SBP  Studies not consistent with SBP however patient with increasing serum creatinine  Began on 25% albumin 25 g x3 doses due to worsening creatinine; diuretics currently on hold.  No need for midodrine as MAPs adequate  Nephrology following  Monitor and trend serum creatinine and electrolytes daily, monitor urine output    # Coffee Ground Emesis and Melena:  Status post EGD 6/13/2025 with evidence of grade D esophagitis; history of gastric dieulafoy lesion as well 2/2025  Continue on Protonix 40 mg twice daily by mouth  Status post ceftriaxone x 5 days for SBP prophylaxis in setting of GI bleed and severe alcohol hepatitis  Monitor and trend hemoglobin, transfuse for goal greater than 7  Alert GI team of any concern for recurrent GI bleeding

## 2025-06-18 NOTE — PROGRESS NOTES
Progress Note - Gastroenterology   Name: Wes Araujo 55 y.o. male I MRN: [id number]  Unit/Bed#: [place] [address] -01 I Date of Admission: 6/12/2025   Date of Service: 6/18/2025 I Hospital Day: 6    Assessment & Plan  Alcoholic hepatitis with ascites  Alcohol abuse  Coffee ground emesis  Melena  55-year-old male with a past medical history of hypertension, CKD, diabetes mellitus type 2, chronic pancreatitis secondary to chronic alcohol use, and recent admission for alc hep with ZENAIDA on CKD who presented to [facility] on 6/12 for concern of recurrent abdominal distension with associated pain.   Labs on presentation significant for continued improvement in AST/ALT/ALP since last admission, however, bilirubin continues to increase.  INR 1.18 on presentation (peaked last admission at 5.53 with dramatic improvement after Vitamin K). Kidney function appeared to be improving from previous admission at which time creatinine peaked at 3.01, but is now worsening rising to 3.29 today.    Patient previously underwent serologic liver workup negative for alternative cause of liver injury with elevations thought to be 2/2 alc hep in setting of some degree of underlying fibrosis.  Patient was treated with conservative management without steroids due to low Madrey's score.  Patient was recommended to follow-up with gastroenterology but now presenting with recurrent abdominal distention/pain along with coffee-ground emesis and melena on admission leading to EGD without evidence of varices, but showing grade D esophagitis.  Hospitalization further complicated by continued abdominal pain likely in the setting of constipation as well as worsening ZENAIDA with concern for HRS now on octreotide and albumin.    Pamela's Discriminant Function - Control Prothrombin 13: 28.78 at 6/18/2025  6:24 AM  Calculated from:  Total Bilirubin: 13.6 mg/dL at 6/18/2025  6:24 AM  Prothrombin Time: 16.3 seconds at 6/18/2025  6:24 AM  MDF = (4.6 * (PT - Control PT)) +  Bilirubin     MELD 3.0: 33 at 6/18/2025  6:24 AM  MELD-Na: 32 at 6/18/2025  6:24 AM  Calculated from:  Serum Creatinine: 3.29 mg/dL (Using max of 3 mg/dL) at 6/18/2025  6:24 AM  Serum Sodium: 135 mmol/L at 6/18/2025  6:24 AM  Total Bilirubin: 13.6 mg/dL at 6/18/2025  6:24 AM  Serum Albumin: 3.7 g/dL (Using max of 3.5 g/dL) at 6/18/2025  6:24 AM  INR(ratio): 1.29 at 6/18/2025  6:24 AM  Age at listing (hypothetical): 55 years  Sex: Male at 6/18/2025  6:24 AM    # Elevated Liver Chemistries - Alcohol hepatitis:   Mild downtrend in LFTs today: AST/ALT 35/26, , and total bilirubin 12.88  Monitor and trend LFTs daily along with INR; no indication for prednisolone  Avoid hepatotoxic medications, strongly encourage complete alcohol cessation  Consider liver biopsy outpatient given significant alkaline phosphatase elevation prior to admission  Recommend complete alcohol cessation moving forward.  Will check Peth this patient states he has not consumed alcohol since before hospitalization last month when he presented on 5/23  Additional pain and symptom management per primary team    # Ascites with ZENAIDA/CKD:   Status post IR paracentesis on 6/13/2025 with removal of 1670 cc, negative for SBP  Studies not consistent with SBP however patient with increasing serum creatinine  Began on 25% albumin 25 g x3 doses due to worsening creatinine; diuretics currently on hold.  No need for midodrine as MAPs adequate  Nephrology following  Monitor and trend serum creatinine and electrolytes daily, monitor urine output    # Coffee Ground Emesis and Melena:  Status post EGD 6/13/2025 with evidence of grade D esophagitis; history of gastric dieulafoy lesion as well 2/2025  Continue on Protonix 40 mg twice daily by mouth  Status post ceftriaxone x 5 days for SBP prophylaxis in setting of GI bleed and severe alcohol hepatitis  Monitor and trend hemoglobin, transfuse for goal greater than 7  Alert GI team of any concern for recurrent GI  bleeding  CKD (chronic kidney disease) stage 4, GFR 15-29 ml/min ([facility])  Lab Results   Component Value Date    EGFR 19 06/18/2025    EGFR 20 06/16/2025    EGFR 21 06/15/2025    CREATININE 3.29 (H) 06/18/2025    CREATININE 3.22 (H) 06/16/2025    CREATININE 3.05 (H) 06/15/2025     Electrolyte abnormality    Severe protein-calorie malnutrition (HCC)  Malnutrition Findings:   Adult Malnutrition type: Acute illness  Adult Degree of Malnutrition: Other severe protein calorie malnutrition  Malnutrition Characteristics: Fat loss, Muscle loss, Inadequate energy                  360 Statement: Severe calorie protein malnutrition in context of acute illness r/t inadequte PO intake in setting of abdominal distention/ pain as evidenced by  moderate body fat (triceps, ribcage area) and muscle mass depletions (temporal wasting, protruding clavicles) energy intake less than 50% compared to estimated needs>5 days; Treated with oral supplements    BMI Findings:           Body mass index is 21 kg/m².     Constipation  Last bowel movement prior to presentation 6/7 per patient report. Plan for miralax bowel prep.     I have discussed the above management plan in detail with the primary service.     Subjective   Pt with continued abd discomfort. No BM. Denies nausea or vomiting. History taken with assistance of [place] interpretor.     Objective :  Temp:  [98.1 °F (36.7 °C)-98.2 °F (36.8 °C)] 98.2 °F (36.8 °C)  HR:  [65-77] 65  BP: (129-155)/(64-75) 139/64  Resp:  [16-18] 16  SpO2:  [94 %-95 %] 94 %  O2 Device: None (Room air)    Physical Exam  Vitals and nursing note reviewed.   Constitutional:       Appearance: He is well-developed and normal weight.   HENT:      Head: Atraumatic.     Eyes:      Conjunctiva/sclera: Conjunctivae normal.     Abdominal:      General: There is distension.      Palpations: Abdomen is soft.      Tenderness: There is abdominal tenderness. There is no guarding or rebound.     Skin:     General: Skin is warm and  dry.      Capillary Refill: Capillary refill takes less than 2 seconds.     Psychiatric:         Mood and Affect: Mood normal.         Lab Results: I have reviewed the following results:none    Imaging Results Review: No pertinent imaging studies reviewed.  Other Study Results Review: No additional pertinent studies reviewed.    Administrative Statements   I have spent a total time of 32 minutes in caring for this patient on the day of the visit/encounter including Diagnostic results, Prognosis, Risks and benefits of tx options, Instructions for management, and Patient and family education.

## 2025-06-18 NOTE — ASSESSMENT & PLAN NOTE
55-year-old male with a past medical history of hypertension, CKD, diabetes mellitus type 2, chronic pancreatitis secondary to chronic alcohol use, and recent admission for alc hep with ZENAIDA on CKD who presented to [facility] on 6/12 for concern of recurrent abdominal distension with associated pain.   Labs on presentation significant for continued improvement in AST/ALT/ALP since last admission, however, bilirubin continues to increase.  INR 1.18 on presentation (peaked last admission at 5.53 with dramatic improvement after Vitamin K). Kidney function appeared to be improving from previous admission at which time creatinine peaked at 3.01, but is now worsening rising to 3.29 today.    Patient previously underwent serologic liver workup negative for alternative cause of liver injury with elevations thought to be 2/2 alc hep in setting of some degree of underlying fibrosis.  Patient was treated with conservative management without steroids due to low Madrey's score.  Patient was recommended to follow-up with gastroenterology but now presenting with recurrent abdominal distention/pain along with coffee-ground emesis and melena on admission leading to EGD without evidence of varices, but showing grade D esophagitis.  Hospitalization further complicated by continued abdominal pain likely in the setting of constipation as well as worsening ZENAIDA with concern for HRS now on octreotide and albumin.    Maddrey's Discriminant Function - Control Prothrombin 13: 28.78 at 6/18/2025  6:24 AM  Calculated from:  Total Bilirubin: 13.6 mg/dL at 6/18/2025  6:24 AM  Prothrombin Time: 16.3 seconds at 6/18/2025  6:24 AM  MDF = (4.6 * (PT - Control PT)) + Bilirubin     MELD 3.0: 33 at 6/18/2025  6:24 AM  MELD-Na: 32 at 6/18/2025  6:24 AM  Calculated from:  Serum Creatinine: 3.29 mg/dL (Using max of 3 mg/dL) at 6/18/2025  6:24 AM  Serum Sodium: 135 mmol/L at 6/18/2025  6:24 AM  Total Bilirubin: 13.6 mg/dL at 6/18/2025  6:24 AM  Serum Albumin: 3.7  g/dL (Using max of 3.5 g/dL) at 6/18/2025  6:24 AM  INR(ratio): 1.29 at 6/18/2025  6:24 AM  Age at listing (hypothetical): 55 years  Sex: Male at 6/18/2025  6:24 AM    # Elevated Liver Chemistries - Alcohol hepatitis:   Mild downtrend in LFTs today: AST/ALT 35/26, , and total bilirubin 12.88  Monitor and trend LFTs daily along with INR; no indication for prednisolone  Avoid hepatotoxic medications, strongly encourage complete alcohol cessation  Consider liver biopsy outpatient given significant alkaline phosphatase elevation prior to admission  Recommend complete alcohol cessation moving forward.  Will check Peth this patient states he has not consumed alcohol since before hospitalization last month when he presented on 5/23  Additional pain and symptom management per primary team    # Ascites with ZENAIDA/CKD:   Status post IR paracentesis on 6/13/2025 with removal of 1670 cc, negative for SBP  Studies not consistent with SBP however patient with increasing serum creatinine  Began on 25% albumin 25 g x3 doses due to worsening creatinine; diuretics currently on hold.  No need for midodrine as MAPs adequate  Nephrology following  Monitor and trend serum creatinine and electrolytes daily, monitor urine output    # Coffee Ground Emesis and Melena:  Status post EGD 6/13/2025 with evidence of grade D esophagitis; history of gastric dieulafoy lesion as well 2/2025  Continue on Protonix 40 mg twice daily by mouth  Status post ceftriaxone x 5 days for SBP prophylaxis in setting of GI bleed and severe alcohol hepatitis  Monitor and trend hemoglobin, transfuse for goal greater than 7  Alert GI team of any concern for recurrent GI bleeding

## 2025-06-18 NOTE — ASSESSMENT & PLAN NOTE
Lab Results   Component Value Date    EGFR 19 06/18/2025    EGFR 20 06/16/2025    EGFR 21 06/15/2025    CREATININE 3.29 (H) 06/18/2025    CREATININE 3.22 (H) 06/16/2025    CREATININE 3.05 (H) 06/15/2025

## 2025-06-18 NOTE — PROGRESS NOTES
Progress Note - Gastroenterology   Name: Wes Araujo 55 y.o. male I MRN: [id number]  Unit/Bed#: [place] [address] -01 I Date of Admission: 6/12/2025   Date of Service: 6/18/2025 I Hospital Day: 6    Assessment & Plan  Alcoholic hepatitis with ascites  Alcohol abuse  Coffee ground emesis  Melena  55-year-old male with a past medical history of hypertension, diabetes mellitus type 2, chronic pancreatitis secondary to chronic alcohol use and recent admission for alc hep with likely ZENAIDA on CKD (creatinine 3.01 at discharge during previous hospitalization, previously 0.8-1 in 2024 but more recently 1.7-2) who presented to [facility] on 6/12 for concern of recurrent abdominal distension with associated pain in the setting of constipation.   Labs on presentation significant for continued improvement in AST/ALT/ALP since last admission, however, bilirubin continues to increase.  INR 1.18 on presentation (peaked last admission at 5.53 with dramatic improvement after Vitamin K). Kidney function appeared to be improving from previous admission at which time creatinine peaked at 3.01, but is now worsening rising to 3.29 today.    Patient previously underwent serologic liver workup negative for alternative cause of liver injury with elevations thought to be 2/2 alc hep in setting of some degree of underlying fibrosis.  Patient was treated with conservative management without steroids due to low Maddrey's score.  Patient was recommended to follow-up with gastroenterology but now presenting with recurrent abdominal distention/pain along with coffee-ground emesis and melena on admission leading to EGD without evidence of varices, but showing grade D esophagitis.  Hospitalization further complicated by continued abdominal pain likely in the setting of constipation as well as worsening ZENAIDA with concern for HRS now on octreotide and albumin.    Maddrey's Discriminant Function - Control Prothrombin 13: 28.78 at 6/18/2025  6:24 AM  Calculated  from:  Total Bilirubin: 13.6 mg/dL at 6/18/2025  6:24 AM  Prothrombin Time: 16.3 seconds at 6/18/2025  6:24 AM  MDF = (4.6 * (PT - Control PT)) + Bilirubin     MELD 3.0: 33 at 6/18/2025  6:24 AM  MELD-Na: 32 at 6/18/2025  6:24 AM  Calculated from:  Serum Creatinine: 3.29 mg/dL (Using max of 3 mg/dL) at 6/18/2025  6:24 AM  Serum Sodium: 135 mmol/L at 6/18/2025  6:24 AM  Total Bilirubin: 13.6 mg/dL at 6/18/2025  6:24 AM  Serum Albumin: 3.7 g/dL (Using max of 3.5 g/dL) at 6/18/2025  6:24 AM  INR(ratio): 1.29 at 6/18/2025  6:24 AM  Age at listing (hypothetical): 55 years  Sex: Male at 6/18/2025  6:24 AM    # Elevated Liver Chemistries - Alcohol hepatitis:   Mild downtrend in LFTs today: AST/ALT 35/26, , and total bilirubin 12.88  Monitor and trend LFTs daily along with INR; no indication for prednisolone  Avoid hepatotoxic medications, strongly encourage complete alcohol cessation  Consider liver biopsy outpatient given significant alkaline phosphatase elevation prior to admission  Recommend complete alcohol cessation moving forward.  Will check Peth this patient states he has not consumed alcohol since before hospitalization last month when he presented on 5/23  Additional pain and symptom management per primary team    # Ascites with ZENAIDA/CKD:   Status post IR paracentesis on 6/13/2025 with removal of 1670 cc, negative for SBP  Studies not consistent with SBP however patient with increasing serum creatinine  Began on 25% albumin 25 g x3 doses due to worsening creatinine; diuretics currently on hold.  No need for midodrine as MAPs adequate  Nephrology following  Monitor and trend serum creatinine and electrolytes daily, monitor urine output  Plan for repeat paracentesis today.  Will repeat fluid studies to rule out SBP given worsening creatinine.  Currently on albumin    # Coffee Ground Emesis and Melena:  Status post EGD 6/13/2025 with evidence of grade D esophagitis; history of gastric dieulafoy lesion as well  2/2025  Continue on Protonix 40 mg twice daily by mouth  Status post ceftriaxone x 5 days for SBP prophylaxis in setting of GI bleed and severe alcohol hepatitis  Monitor and trend hemoglobin, transfuse for goal greater than 7  Alert GI team of any concern for recurrent GI bleeding    # Transplant  Patient with history of alcoholic hepatitis and now concern for HRS which may potentially need for transplant.  He has recent alcohol use within the past 3 weeks  Check path  Discussed concern with family at bedside including parents and brother.  He has a good support system from all family members including parents who he lives with.  They have encouraged him recently to stop alcohol consumption and he is now adamant that he will not drink again  No previous DUIs or legal trouble associated with drinking.  No previous stays in alcohol rehabilitation  CKD (chronic kidney disease) stage 4, GFR 15-29 ml/min ([facility])  Lab Results   Component Value Date    EGFR 19 06/18/2025    EGFR 20 06/16/2025    EGFR 21 06/15/2025    CREATININE 3.29 (H) 06/18/2025    CREATININE 3.22 (H) 06/16/2025    CREATININE 3.05 (H) 06/15/2025     Electrolyte abnormality    Severe protein-calorie malnutrition (HCC)  Malnutrition Findings:   Adult Malnutrition type: Acute illness  Adult Degree of Malnutrition: Other severe protein calorie malnutrition  Malnutrition Characteristics: Fat loss, Muscle loss, Inadequate energy                  360 Statement: Severe calorie protein malnutrition in context of acute illness r/t inadequte PO intake in setting of abdominal distention/ pain as evidenced by  moderate body fat (triceps, ribcage area) and muscle mass depletions (temporal wasting, protruding clavicles) energy intake less than 50% compared to estimated needs>5 days; Treated with oral supplements    BMI Findings:           Body mass index is 21 kg/m².     Constipation  Last bowel movement prior to presentation 6/7 per patient report. Plan for GoLytely  bowel prep.     I have discussed the above management plan in detail with the primary service.     Subjective   Patient with continued abdominal discomfort.  No bowel movements.    Objective :  Temp:  [98.1 °F (36.7 °C)-98.2 °F (36.8 °C)] 98.2 °F (36.8 °C)  HR:  [65-77] 65  BP: (129-155)/(64-75) 139/64  Resp:  [16-18] 16  SpO2:  [94 %-95 %] 94 %  O2 Device: None (Room air)    Physical Exam  Vitals and nursing note reviewed.   Constitutional:       Appearance: He is well-developed and normal weight.   HENT:      Head: Atraumatic.     Eyes:      Conjunctiva/sclera: Conjunctivae normal.     Abdominal:      General: There is distension.      Palpations: Abdomen is soft.      Tenderness: There is abdominal tenderness. There is no guarding or rebound.     Skin:     General: Skin is warm and dry.      Capillary Refill: Capillary refill takes less than 2 seconds.     Psychiatric:         Mood and Affect: Mood normal.           Lab Results: I have reviewed the following results:none    Imaging Results Review: No pertinent imaging studies reviewed.  Other Study Results Review: No additional pertinent studies reviewed.    Administrative Statements   I have spent a total time of 32 minutes in caring for this patient on the day of the visit/encounter including Diagnostic results, Prognosis, Risks and benefits of tx options, Instructions for management, and Patient and family education.

## 2025-06-18 NOTE — ASSESSMENT & PLAN NOTE
55-year-old male with a past medical history of hypertension, CKD, diabetes mellitus type 2, chronic pancreatitis secondary to chronic alcohol use, and recent admission for alc hep with ZENAIDA on CKD who presented to [facility] on 6/12 for concern of recurrent abdominal distension with associated pain.   Labs on presentation significant for continued improvement in AST/ALT/ALP since last admission, however, bilirubin continues to increase.  INR 1.18 on presentation (peaked last admission at 5.53 with dramatic improvement after Vitamin K). Kidney function appeared to be improving from previous admission at which time creatinine peaked at 3.01, but is now worsening rising to 3.29 today.    Patient previously underwent serologic liver workup negative for alternative cause of liver injury with elevations thought to be 2/2 alc hep in setting of some degree of underlying fibrosis.  Patient was treated with conservative management without steroids due to low Madrey's score.  Patient was recommended to follow-up with gastroenterology but now presenting with recurrent abdominal distention/pain along with coffee-ground emesis and melena on admission leading to EGD without evidence of varices, but showing grade D esophagitis.  Hospitalization further complicated by continued abdominal pain likely in the setting of constipation as well as worsening ZENAIDA with concern for HRS now on octreotide and albumin.    Maddrey's Discriminant Function - Control Prothrombin 13: 28.78 at 6/18/2025  6:24 AM  Calculated from:  Total Bilirubin: 13.6 mg/dL at 6/18/2025  6:24 AM  Prothrombin Time: 16.3 seconds at 6/18/2025  6:24 AM  MDF = (4.6 * (PT - Control PT)) + Bilirubin     MELD 3.0: 33 at 6/18/2025  6:24 AM  MELD-Na: 32 at 6/18/2025  6:24 AM  Calculated from:  Serum Creatinine: 3.29 mg/dL (Using max of 3 mg/dL) at 6/18/2025  6:24 AM  Serum Sodium: 135 mmol/L at 6/18/2025  6:24 AM  Total Bilirubin: 13.6 mg/dL at 6/18/2025  6:24 AM  Serum Albumin: 3.7  g/dL (Using max of 3.5 g/dL) at 6/18/2025  6:24 AM  INR(ratio): 1.29 at 6/18/2025  6:24 AM  Age at listing (hypothetical): 55 years  Sex: Male at 6/18/2025  6:24 AM    # Elevated Liver Chemistries - Alcohol hepatitis:   Mild downtrend in LFTs today: AST/ALT 35/26, , and total bilirubin 12.88  Monitor and trend LFTs daily along with INR; no indication for prednisolone  Avoid hepatotoxic medications, strongly encourage complete alcohol cessation  Consider liver biopsy outpatient given significant alkaline phosphatase elevation prior to admission  Recommend complete alcohol cessation moving forward.  Will check Peth this patient states he has not consumed alcohol since before hospitalization last month when he presented on 5/23  Additional pain and symptom management per primary team    # Ascites with ZENAIDA/CKD:   Status post IR paracentesis on 6/13/2025 with removal of 1670 cc, negative for SBP  Studies not consistent with SBP however patient with increasing serum creatinine  Began on 25% albumin 25 g x3 doses due to worsening creatinine; diuretics currently on hold.  No need for midodrine as MAPs adequate  Nephrology following  Monitor and trend serum creatinine and electrolytes daily, monitor urine output    # Coffee Ground Emesis and Melena:  Status post EGD 6/13/2025 with evidence of grade D esophagitis; history of gastric dieulafoy lesion as well 2/2025  Continue on Protonix 40 mg twice daily by mouth  Status post ceftriaxone x 5 days for SBP prophylaxis in setting of GI bleed and severe alcohol hepatitis  Monitor and trend hemoglobin, transfuse for goal greater than 7  Alert GI team of any concern for recurrent GI bleeding   0

## 2025-06-18 NOTE — ASSESSMENT & PLAN NOTE
55-year-old male with a past medical history of hypertension, diabetes mellitus type 2, chronic pancreatitis secondary to chronic alcohol use and recent admission for alc hep with likely ZENAIDA on CKD (creatinine 3.01 at discharge during previous hospitalization, previously 0.8-1 in 2024 but more recently 1.7-2) who presented to [facility] on 6/12 for concern of recurrent abdominal distension with associated pain in the setting of constipation.   Labs on presentation significant for continued improvement in AST/ALT/ALP since last admission, however, bilirubin continues to increase.  INR 1.18 on presentation (peaked last admission at 5.53 with dramatic improvement after Vitamin K). Kidney function appeared to be improving from previous admission at which time creatinine peaked at 3.01, but is now worsening rising to 3.29 today.    Patient previously underwent serologic liver workup negative for alternative cause of liver injury with elevations thought to be 2/2 alc hep in setting of some degree of underlying fibrosis.  Patient was treated with conservative management without steroids due to low Maddrey's score.  Patient was recommended to follow-up with gastroenterology but now presenting with recurrent abdominal distention/pain along with coffee-ground emesis and melena on admission leading to EGD without evidence of varices, but showing grade D esophagitis.  Hospitalization further complicated by continued abdominal pain likely in the setting of constipation as well as worsening ZENAIDA with concern for HRS now on octreotide and albumin.    Maddrey's Discriminant Function - Control Prothrombin 13: 28.78 at 6/18/2025  6:24 AM  Calculated from:  Total Bilirubin: 13.6 mg/dL at 6/18/2025  6:24 AM  Prothrombin Time: 16.3 seconds at 6/18/2025  6:24 AM  MDF = (4.6 * (PT - Control PT)) + Bilirubin     MELD 3.0: 33 at 6/18/2025  6:24 AM  MELD-Na: 32 at 6/18/2025  6:24 AM  Calculated from:  Serum Creatinine: 3.29 mg/dL (Using max of 3  mg/dL) at 6/18/2025  6:24 AM  Serum Sodium: 135 mmol/L at 6/18/2025  6:24 AM  Total Bilirubin: 13.6 mg/dL at 6/18/2025  6:24 AM  Serum Albumin: 3.7 g/dL (Using max of 3.5 g/dL) at 6/18/2025  6:24 AM  INR(ratio): 1.29 at 6/18/2025  6:24 AM  Age at listing (hypothetical): 55 years  Sex: Male at 6/18/2025  6:24 AM    # Elevated Liver Chemistries - Alcohol hepatitis:   Mild downtrend in LFTs today: AST/ALT 35/26, , and total bilirubin 12.88  Monitor and trend LFTs daily along with INR; no indication for prednisolone  Avoid hepatotoxic medications, strongly encourage complete alcohol cessation  Consider liver biopsy outpatient given significant alkaline phosphatase elevation prior to admission  Recommend complete alcohol cessation moving forward.  Will check Peth this patient states he has not consumed alcohol since before hospitalization last month when he presented on 5/23  Additional pain and symptom management per primary team    # Ascites with ZENAIDA/CKD:   Status post IR paracentesis on 6/13/2025 with removal of 1670 cc, negative for SBP  Studies not consistent with SBP however patient with increasing serum creatinine  Began on 25% albumin 25 g x3 doses due to worsening creatinine; diuretics currently on hold.  No need for midodrine as MAPs adequate  Nephrology following  Monitor and trend serum creatinine and electrolytes daily, monitor urine output  Plan for repeat paracentesis today.  Will repeat fluid studies to rule out SBP given worsening creatinine.  Currently on albumin    # Coffee Ground Emesis and Melena:  Status post EGD 6/13/2025 with evidence of grade D esophagitis; history of gastric dieulafoy lesion as well 2/2025  Continue on Protonix 40 mg twice daily by mouth  Status post ceftriaxone x 5 days for SBP prophylaxis in setting of GI bleed and severe alcohol hepatitis  Monitor and trend hemoglobin, transfuse for goal greater than 7  Alert GI team of any concern for recurrent GI bleeding    #  Transplant  Patient with history of alcoholic hepatitis and now concern for HRS which may potentially need for transplant.  He has recent alcohol use within the past 3 weeks  Check path  Discussed concern with family at bedside including parents and brother.  He has a good support system from all family members including parents who he lives with.  They have encouraged him recently to stop alcohol consumption and he is now adamant that he will not drink again  No previous DUIs or legal trouble associated with drinking.  No previous stays in alcohol rehabilitation

## 2025-06-18 NOTE — PROGRESS NOTES
NEPHROLOGY HOSPITAL PROGRESS NOTE   Wes Araujo 55 y.o. male MRN: [id number]  Unit/Bed#: [place] [address] -01 Encounter: [id number]  Reason for Consult: ZENAIDA  Assessment & Plan  CKD (chronic kidney disease) stage 4, GFR 15-29 ml/min ([facility])  Baseline creatinine was around 1.9-2.1 in April to May 2025.  During recent admission to [facility] from May to early June 2025, creatinine was around 2.9-3.0 on discharge.  SCr 3.01 on 6/6/2025.  Admission creatinine 2.43 on 6/12/25.   Renal function worsening since admission.  SCr up to 3.29 today.  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   Doubt prerenal azotemia since no improvement with albumin. No hydronephrosis on CT.   Overall, renal function is stable over the past 3 days.  Follow up UA and urine Na - ordered but not sent.   Continue Albumin 25 gm every 8 hours.   Continue Octreotide 100 mcg q8H.     Alcoholic hepatitis with ascites  GI following.  Had paracentesis on 6/13/2025 with 1670 cc drained.  For repeat paracentesis today.     Primary hypertension  BP is acceptable.   Current Rx: Amlodipine 5 mg BID, Carvedilol 6.25 mg BID.   Hydralazine stopped on 6/16/25  Continue same meds.     Electrolyte abnormality  Metabolic acidosis:  CO2 is 19 today - worse because refused meds yesterday.   Continue sodium bicarbonate 1300 mg TID    Anemia  Hemoglobin stable.  Defer to primary service.    Coffee ground emesis  S/p EGD on 6/13/25 with esophagitis.  Currently on Protonix 40 mg BID.       TODAY's DISCUSSION/PLAN:  Overall, renal function is stable over the past 3 days (SCr 3.2 to 3.3)  Continue Albumin and Octreotide at current doses.   Check UA and urine Na - not sent.   For paracentesis today.     SUBJECTIVE / 24H INTERVAL HISTORY:  Complaining of abdominal distention.   He was agitated when I saw him and was asking when his abdominal distention is going to be addressed.   RN reports that patient has been nasty to staff.   No acute events overnight.  "    OBJECTIVE:  Current Weight: Weight - Scale: 64.5 kg (142 lb 3.2 oz)  Vitals:    06/17/25 1524 06/17/25 1909 06/18/25 0315 06/18/25 0730   BP: 155/72 129/66 143/73 146/75   BP Location:    Right arm   Pulse: 68   77   Resp:   18 16   Temp: 98.2 °F (36.8 °C) 98.1 °F (36.7 °C)  98.2 °F (36.8 °C)   TempSrc:    Oral   SpO2: 95%   95%   Weight:       Height:           Intake/Output Summary (Last 24 hours) at 6/18/2025 0934  Last data filed at 6/18/2025 0611  Gross per 24 hour   Intake 480 ml   Output --   Net 480 ml     General: conscious, cooperative, no distress  Skin: dry  Eyes: pink conjunctivae  ENT: dry mucous membranes  Respiratory: equal chest expansion, clear breath sounds.  Cardiovascular: distinct heart sounds, normal rate, regular rhythm, no rub  Abdomen: soft, non tender, distended, normal bowel sounds  Extremities: no edema.   Genitourinary: no ibrahim catheter.   Neuro: awake, alert.   Psych: agitated.     Medications:  Current Medications[1]    Laboratory Results:  Results from last 7 days   Lab Units 06/18/25  0624 06/16/25  0506 06/15/25  0523 06/14/25  0447 06/13/25  0553 06/12/25  1819 06/12/25  1714 06/12/25  1642   WBC Thousand/uL  --  12.51* 12.40* 16.16* 11.44*  --   --  10.34*   HEMOGLOBIN g/dL  --  8.8* 8.5* 6.9* 7.6*  --   --  8.3*   HEMATOCRIT %  --  25.1* 24.2* 19.7* 21.7*  --   --  24.3*   PLATELETS Thousands/uL  --  186 178 198 209  --   --  245   POTASSIUM mmol/L 5.3 4.3 4.8 5.1 5.1 4.7 6.7*  --    CHLORIDE mmol/L 104 107 107 108 109*  --  108  --    CO2 mmol/L 19* 20* 20* 19* 18*  --  17*  --    BUN mg/dL 43* 39* 38* 37* 33*  --  29*  --    CREATININE mg/dL 3.29* 3.22* 3.05* 2.81* 2.42*  --  2.43*  --    CALCIUM mg/dL 9.0 9.2 8.7 9.0 9.0  --  8.6  --      Portions of the record may have been created with voice recognition software. Occasional wrong word or \"sound a like\" substitutions may have occurred due to the inherent limitations of voice recognition software. Read the chart " carefully and recognize, using context, where substitutions have occurred.If you have any questions, please contact the dictating provider.         [1]   Current Facility-Administered Medications:     acetaminophen (TYLENOL) tablet 325 mg, 325 mg, Oral, Q8H PRN, ARIANE Moore, 325 mg at 06/16/25 2308    albumin human (FLEXBUMIN) 25 % injection 25 g, 25 g, Intravenous, Q8H, Kiran Madden MD, 25 g at 06/18/25 0611    amLODIPine (NORVASC) tablet 5 mg, 5 mg, Oral, BID, Michael Jewell MD, 5 mg at 06/18/25 0914    bisacodyl (DULCOLAX) rectal suppository 10 mg, 10 mg, Rectal, Daily PRN, Jazmín Buchanan MD    carvedilol (COREG) tablet 6.25 mg, 6.25 mg, Oral, BID With Meals, Juan Ramon Manley PA-C, 6.25 mg at 06/18/25 0914    docusate sodium (COLACE) capsule 100 mg, 100 mg, Oral, BID, Michael Jewell MD, 100 mg at 06/18/25 0914    folic acid (FOLVITE) tablet 1 mg, 1 mg, Oral, Daily, Michael Jewell MD, 1 mg at 06/18/25 0914    HYDROmorphone HCl (DILAUDID) injection 0.2 mg, 0.2 mg, Intravenous, Q3H PRN, Josh Cain MD    insulin aspart protamine-insulin aspart (NovoLOG 70/30) 100 units/mL subcutaneous injection 8 Units, 8 Units, Subcutaneous, BID AC, Naa Quiles MD, 8 Units at 06/18/25 0915    insulin lispro (HumALOG/ADMELOG) 100 units/mL subcutaneous injection 1-5 Units, 1-5 Units, Subcutaneous, TID AC, 2 Units at 06/18/25 0915 **AND** Fingerstick Glucose (POCT), , , TID ACJuan Ramon PA-C    insulin lispro (HumALOG/ADMELOG) 100 units/mL subcutaneous injection 1-5 Units, 1-5 Units, Subcutaneous, HS, Juan Ramon Manley PA-C, 2 Units at 06/17/25 2327    octreotide (SandoSTATIN) injection 100 mcg, 100 mcg, Subcutaneous, Q8H JAISON, Kiran Madden MD, 100 mcg at 06/18/25 0611    pancrelipase (Lip-Prot-Amyl) (CREON) delayed release capsule 24,000 Units, 24,000 Units, Oral, TID With Meals, Michael Jewell MD, 24,000 Units at 06/18/25 0914     pantoprazole (PROTONIX) EC tablet 40 mg, 40 mg, Oral, BID AC, Naa Quiles MD, 40 mg at 06/18/25 0611    polyethylene glycol (MIRALAX) packet 17 g, 17 g, Oral, Daily PRN, Juan Ramon Manley PA-C    polyethylene glycol (MIRALAX) packet 17 g, 17 g, Oral, Daily, Vishnu Larsen MD, 17 g at 06/18/25 0914    senna (SENOKOT) tablet 17.2 mg, 2 tablet, Oral, HS, Michael Jewell MD, 17.2 mg at 06/17/25 2328    sodium bicarbonate tablet 1,300 mg, 1,300 mg, Oral, TID after meals, Juan Ramon Manley PA-C, 1,300 mg at 06/18/25 0914    thiamine tablet 100 mg, 100 mg, Oral, Daily, Michael Jewell MD, 100 mg at 06/18/25 0914    trimethobenzamide (TIGAN) IM injection 200 mg, 200 mg, Intramuscular, Q6H PRN, Juan Ramon Manley PA-C, 200 mg at 06/13/25 0799

## 2025-06-18 NOTE — PLAN OF CARE
Problem: PAIN - ADULT  Goal: Verbalizes/displays adequate comfort level or baseline comfort level  Description: Interventions:  - Encourage patient to monitor pain and request assistance  - Assess pain using appropriate pain scale  - Administer analgesics as ordered based on type and severity of pain and evaluate response  - Implement non-pharmacological measures as appropriate and evaluate response  - Consider cultural and social influences on pain and pain management  - Notify physician/advanced practitioner if interventions unsuccessful or patient reports new pain  - Educate patient/family on pain management process including their role and importance of  reporting pain   - Provide non-pharmacologic/complimentary pain relief interventions  Outcome: Progressing     Problem: INFECTION - ADULT  Goal: Absence or prevention of progression during hospitalization  Description: INTERVENTIONS:  - Assess and monitor for signs and symptoms of infection  - Monitor lab/diagnostic results  - Monitor all insertion sites, i.e. indwelling lines, tubes, and drains  - Monitor endotracheal if appropriate and nasal secretions for changes in amount and color  - [place] appropriate cooling/warming therapies per order  - Administer medications as ordered  - Instruct and encourage patient and family to use good hand hygiene technique  - Identify and instruct in appropriate isolation precautions for identified infection/condition  Outcome: Progressing  Goal: Absence of fever/infection during neutropenic period  Description: INTERVENTIONS:  - Monitor WBC  - Perform strict hand hygiene  - Limit to healthy visitors only  - No plants, dried, fresh or silk flowers with mcclure in patient room  Outcome: Progressing     Problem: SAFETY ADULT  Goal: Patient will remain free of falls  Description: INTERVENTIONS:  - Educate patient/family on patient safety including physical limitations  - Instruct patient to call for assistance with activity   -  Consider consulting OT/PT to assist with strengthening/mobility based on AM PAC & JH-HLM score  - Consult OT/PT to assist with strengthening/mobility   - Keep Call bell within reach  - Keep bed low and locked with side rails adjusted as appropriate  - Keep care items and personal belongings within reach  - Initiate and maintain comfort rounds  - Make Fall Risk Sign visible to staff  - Offer Toileting every 2 Hours, in advance of need  - Initiate/Maintain bed alarm  - Obtain necessary fall risk management equipment: yellow socks.  - Apply yellow socks and bracelet for high fall risk patients  - Consider moving patient to room near nurses station  Outcome: Progressing  Goal: Maintain or return to baseline ADL function  Description: INTERVENTIONS:  -  Assess patient's ability to carry out ADLs; assess patient's baseline for ADL function and identify physical deficits which impact ability to perform ADLs (bathing, care of mouth/teeth, toileting, grooming, dressing, etc.)  - Assess/evaluate cause of self-care deficits   - Assess range of motion  - Assess patient's mobility; develop plan if impaired  - Assess patient's need for assistive devices and provide as appropriate  - Encourage maximum independence but intervene and supervise when necessary  - Involve family in performance of ADLs  - Assess for home care needs following discharge   - Consider OT consult to assist with ADL evaluation and planning for discharge  - Provide patient education as appropriate  - Monitor functional capacity and physical performance, use of AM PAC & JH-HLM   - Monitor gait, balance and fatigue with ambulation    Outcome: Progressing  Goal: Maintains/Returns to pre admission functional level  Description: INTERVENTIONS:  - Perform AM-PAC 6 Click Basic Mobility/ Daily Activity assessment daily.  - Set and communicate daily mobility goal to care team and patient/family/caregiver.   - Collaborate with rehabilitation services on mobility goals  if consulted  - Perform Range of Motion 3 times a day.  - Reposition patient every 2 hours.  - Dangle patient 3 times a day  - Stand patient 3 times a day  - Ambulate patient 3 times a day  - Out of bed to chair 3 times a day   - Out of bed for meals 3 times a day  - Out of bed for toileting  - Record patient progress and toleration of activity level   Outcome: Progressing     Problem: DISCHARGE PLANNING  Goal: Discharge to home or other facility with appropriate resources  Description: INTERVENTIONS:  - Identify barriers to discharge w/patient and caregiver  - Arrange for needed discharge resources and transportation as appropriate  - Identify discharge learning needs (meds, wound care, etc.)  - Arrange for interpretive services to assist at discharge as needed  - Refer to Case Management Department for coordinating discharge planning if the patient needs post-hospital services based on physician/advanced practitioner order or complex needs related to functional status, cognitive ability, or social support system  Outcome: Progressing     Problem: Knowledge Deficit  Goal: Patient/family/caregiver demonstrates understanding of disease process, treatment plan, medications, and discharge instructions  Description: Complete learning assessment and assess knowledge base.  Interventions:  - Provide teaching at level of understanding  - Provide teaching via preferred learning methods  Outcome: Progressing     Problem: GASTROINTESTINAL - ADULT  Goal: Minimal or absence of nausea and/or vomiting  Description: INTERVENTIONS:  - Administer IV fluids if ordered to ensure adequate hydration  - Maintain NPO status until nausea and vomiting are resolved  - Nasogastric tube if ordered  - Administer ordered antiemetic medications as needed  - Provide nonpharmacologic comfort measures as appropriate  - Advance diet as tolerated, if ordered  - Consider nutrition services referral to assist patient with adequate nutrition and  appropriate food choices  Outcome: Progressing  Goal: Maintains or returns to baseline bowel function  Description: INTERVENTIONS:  - Assess bowel function  - Encourage oral fluids to ensure adequate hydration  - Administer IV fluids if ordered to ensure adequate hydration  - Administer ordered medications as needed  - Encourage mobilization and activity  - Consider nutritional services referral to assist patient with adequate nutrition and appropriate food choices  Outcome: Progressing  Goal: Maintains adequate nutritional intake  Description: INTERVENTIONS:  - Monitor percentage of each meal consumed  - Identify factors contributing to decreased intake, treat as appropriate  - Assist with meals as needed  - Monitor I&O, weight, and lab values if indicated  - Obtain nutrition services referral as needed  Outcome: Progressing     Problem: METABOLIC, FLUID AND ELECTROLYTES - ADULT  Goal: Electrolytes maintained within normal limits  Description: INTERVENTIONS:  - Monitor labs and assess patient for signs and symptoms of electrolyte imbalances  - Administer electrolyte replacement as ordered  - Monitor response to electrolyte replacements, including repeat lab results as appropriate  - Instruct patient on fluid and nutrition as appropriate  Outcome: Progressing  Goal: Fluid balance maintained  Description: INTERVENTIONS:  - Monitor labs   - Monitor I/O and WT  - Instruct patient on fluid and nutrition as appropriate  - Assess for signs & symptoms of volume excess or deficit  Outcome: Progressing  Goal: Glucose maintained within target range  Description: INTERVENTIONS:  - Monitor Blood Glucose as ordered  - Assess for signs and symptoms of hyperglycemia and hypoglycemia  - Administer ordered medications to maintain glucose within target range  - Assess nutritional intake and initiate nutrition service referral as needed  Outcome: Progressing     Problem: Nutrition/Hydration-ADULT  Goal: Nutrient/Hydration intake  appropriate for improving, restoring or maintaining nutritional needs  Description: Monitor and assess patient's nutrition/hydration status for malnutrition. Collaborate with interdisciplinary team and initiate plan and interventions as ordered.  Monitor patient's weight and dietary intake as ordered or per policy. Utilize nutrition screening tool and intervene as necessary. Determine patient's food preferences and provide high-protein, high-caloric foods as appropriate.     INTERVENTIONS:  - Monitor oral intake, urinary output, labs, and treatment plans  - Assess nutrition and hydration status and recommend course of action  - Evaluate amount of meals eaten  - Assist patient with eating if necessary   - Allow adequate time for meals  - Recommend/ encourage appropriate diets, oral nutritional supplements, and vitamin/mineral supplements  - Order, calculate, and assess calorie counts as needed  - Recommend, monitor, and adjust tube feedings and TPN/PPN based on assessed needs  - Assess need for intravenous fluids  - Provide specific nutrition/hydration education as appropriate  - Include patient/family/caregiver in decisions related to nutrition  Outcome: Progressing     Problem: Prexisting or High Potential for Compromised Skin Integrity  Goal: Skin integrity is maintained or improved  Description: INTERVENTIONS:  - Identify patients at risk for skin breakdown  - Assess and monitor skin integrity including under and around medical devices   - Assess and monitor nutrition and hydration status  - Monitor labs  - Assess for incontinence   - Turn and reposition patient  - Assist with mobility/ambulation  - Relieve pressure over keith prominences   - Avoid friction and shearing  - Provide appropriate hygiene as needed including keeping skin clean and dry  - Evaluate need for skin moisturizer/barrier cream  - Collaborate with interdisciplinary team  - Patient/family teaching  - Consider wound care consult    Assess:  -  Review Jose scale daily  - Clean and moisturize skin every shift  - Inspect skin when repositioning, toileting, and assisting with ADLS  - Assess under medical devices such as masimo every shift  - Assess extremities for adequate circulation and sensation     Bed Management:  - Have minimal linens on bed & keep smooth, unwrinkled  - Change linens as needed when moist or perspiring  - Avoid sitting or lying in one position for more than 2 hours while in bed?Keep HOB at 30 degrees   - Toileting:  - Offer bedside commode  - Assess for incontinence every 2 hrs  - Use incontinent care products after each incontinent episode such as lotion    Activity:  - Mobilize patient 3 times a day  - Encourage activity and walks on unit  - Encourage or provide ROM exercises   - Turn and reposition patient every 2 Hours  - Use appropriate equipment to lift or move patient in bed  - Instruct/ Assist with weight shifting every 1 hr when out of bed in chair  - Consider limitation of chair time 1 hour intervals    Skin Care:  - Avoid use of baby powder, tape, friction and shearing, hot water or constrictive clothing  - Relieve pressure over bony prominences using allevyn  - Do not massage red bony areas    Next Steps:  - Teach patient strategies to minimize risks such as repositioning   - Consider consults to  interdisciplinary teams such as PTOT  Outcome: Progressing     Problem: Potential for Falls  Goal: Patient will remain free of falls  Description: INTERVENTIONS:  - Educate patient/family on patient safety including physical limitations  - Instruct patient to call for assistance with activity   - Consider consulting OT/PT to assist with strengthening/mobility based on AM PAC & JH-HLM score  - Consult OT/PT to assist with strengthening/mobility   - Keep Call bell within reach  - Keep bed low and locked with side rails adjusted as appropriate  - Keep care items and personal belongings within reach  - Initiate and maintain comfort  rounds  - Make Fall Risk Sign visible to staff  - Offer Toileting every 2 Hours, in advance of need  - Initiate/Maintain bed alarm  - Obtain necessary fall risk management equipment: yellow socks.  - Apply yellow socks and bracelet for high fall risk patients  - Consider moving patient to room near nurses station  Outcome: Progressing

## 2025-06-18 NOTE — PROGRESS NOTES
Progress Note - Hospitalist   Name: Wes Araujo 55 y.o. male I MRN: [id number]  Unit/Bed#: [place] [address] -01 I Date of Admission: 6/12/2025   Date of Service: 6/18/2025 I Hospital Day: 6    Assessment & Plan  Alcoholic hepatitis with ascites  Patient is a 55-year-old male with a past medical history of alcohol abuse, alcoholic liver cirrhosis, anemia, hypertension, type 2 diabetes, history of atrial fibrillation, chronic pancreatitis who presented to the hospital with abdominal pain and distention.  Overnight on the day of admission developed coffee-ground emesis and melena  Patient was admitted from 5/23/2025 to 6/6/2025 secondary to toxic metabolic encephalopathy secondary to hyperglycemia, HHNK, kidney injury and acute liver failure.  Received insulin, IV fluids, seen by gastroenterology, DF did not meet criteria for steroids, infection was ruled out, initial plan was for patient to be discharged to inpatient alcoholic rehab but patient refused and he was discharged home    6/12/2025 CT showed large volume ascites, increased since 6/3/2025   6/13/2025 underwent paracentesis 1670 cc clear fluid removed, no evidence of SBP, fluid cultures are pending  6/13/2025 underwent EGD which showed esophagitis.  No esophageal or gastric varices.  Ceftriaxone for 5 days for SBP prophylaxis in the setting of GI bleed  No indication for prednisolone  Due to worsening of kidney function the patient has been treated with albumin and octreotide.  Followed by GI  The patient underwent paracentesis today.  5700 cc of fluid was withdrawn.  Coffee ground emesis  Night of admission 6/12/2025 developed coffee-ground emesis and melena  6/13/2025 underwent EGD which showed esophagitis, no esophageal or gastric varices.  Rest of the exam normal  Per GI suspect coffee-ground emesis in the setting of severe esophagitis  6/14 for hemoglobin of 6.9 received a unit of blood  Hemoglobin has stabilized.  No signs of GI bleed  Continue Protonix  40 mg twice daily  Transfuse if hemoglobin less than 7  Melena  Secondary to severe esophagitis  See plan above  Anemia  Recent hemoglobin in the range of 7-9  Patient presented with abdominal pain, had coffee-ground emesis and melena on this admission  Underwent EGD which showed severe esophagitis most likely the cause of the coffee-ground emesis and melena  Received a unit of blood on 6/14  Hemoglobin stable  CKD (chronic kidney disease) stage 4, GFR 15-29 ml/min ([facility])  Lab Results   Component Value Date    EGFR 19 06/18/2025    EGFR 20 06/16/2025    EGFR 21 06/15/2025    CREATININE 3.29 (H) 06/18/2025    CREATININE 3.22 (H) 06/16/2025    CREATININE 3.05 (H) 06/15/2025   Seen by nephrology on his previous admission, last seen on 6/6/2025  Per nephro: Baseline creatinine has been from 1.5-2.0 but on last admission his creatinine plateaued at 2.8-3.0 which may represent his new baseline due to his previous advanced CKD and recent ZENAIDA  He is high risk for dialysis in the near future  Nephrology consult is greatly appreciated.  The patient has been started on octreotide.  Alcohol abuse  Pt with history of alcohol abuse with withdrawal. He states he typically drinks multiple beer a day, but states he has not been drinking since he was discharged from the hospital last week  ETOH <10  Monitor on CIWA for now  Vitamin supplementation  Encourage continued cessation  Type 2 diabetes mellitus with hyperglycemia, with long-term current use of insulin ([facility])  Lab Results   Component Value Date    HGBA1C 9.6 (H) 05/26/2025       Recent Labs     06/17/25  2101 06/18/25  0730 06/18/25  1146 06/18/25  1605   POCGLU 245* 266* 228* 136       HHNK on his previous admission due to noncompliance and alcohol abuse  Home regimen is 70/30, 8 units twice daily, which was held due to coffee-ground emesis  Restarted 70/30 at 8 units twice daily on 6/14  Generalized weakness  Pt reports that since discharge he has been having difficulty with  ambulation  Typically he ambulates without assistive devices, but he reports he has been struggling  PT/OT eval  Primary hypertension  Continue amlodipine 5 mg twice daily, hydralazine 25 mg 3 times daily, and Coreg 6.25 twice daily with holding parameters  Chronic pancreatitis (HCC)  Patient with a history of chronic pancreatitis secondary to alcohol   Chronic pancreatitis stable on CT imaging on admission    History of atrial fibrillation  History of atrial fibrillation, continue Coreg  Not on anticoagulation at baseline  Constipation  Pt reports no bowel movement for 6 days  Bowel regimen initiated  Severe protein-calorie malnutrition (HCC)  Malnutrition Findings:   Adult Malnutrition type: Acute illness  Adult Degree of Malnutrition: Other severe protein calorie malnutrition  Malnutrition Characteristics: Fat loss, Muscle loss, Inadequate energy     360 Statement: Severe calorie protein malnutrition in context of acute illness r/t inadequte PO intake in setting of abdominal distention/ pain as evidenced by  moderate body fat (triceps, ribcage area) and muscle mass depletions (temporal wasting, protruding clavicles) energy intake less than 50% compared to estimated needs>5 days; Treated with oral supplements    BMI Findings:    Body mass index is 21 kg/m².         Subjective:  The patient feels better today.  He underwent paracentesis and 5700 cc of fluid was withdrawn.  He has less abdominal discomfort following this.  He denies any chest pain or shortness of breath.  He denies nausea or vomiting.    Physical Exam:   Temp:  [98.1 °F (36.7 °C)-98.7 °F (37.1 °C)] 98.7 °F (37.1 °C)  HR:  [65-77] 68  BP: (129-151)/(64-78) 142/69  Resp:  [15-18] 18  SpO2:  [94 %-99 %] 97 %  O2 Device: None (Room air)    Gen: Well-developed, chronically ill, in no distress.  Neck: Supple.  No lymphadenopathy or goiter.  Heart: Regular rhythm.  I heard no murmur, gallop, or rub.  Lungs: Clear to auscultation and percussion.  No  wheezing, rales, or rhonchi.  Abd: Soft and flat.  Bowel sounds are active.  No mass, organomegaly, or tenderness.  Extremities: No clubbing, cyanosis, or edema.  No calf tenderness.  Neuro: Alert and oriented.  No focal sign.  Skin: Warm and dry.      LABS:   CMP:   Lab Results   Component Value Date    SODIUM 135 06/18/2025    K 5.3 06/18/2025     06/18/2025    CO2 19 (L) 06/18/2025    BUN 43 (H) 06/18/2025    CREATININE 3.29 (H) 06/18/2025    CALCIUM 9.0 06/18/2025    AST 35 06/18/2025    ALT 22 06/18/2025    ALKPHOS 267 (H) 06/18/2025    EGFR 19 06/18/2025           VTE Pharmacologic Prophylaxis: Sequential compression device (Venodyne)   VTE Mechanical Prophylaxis: sequential compression device

## 2025-06-18 NOTE — ASSESSMENT & PLAN NOTE
Lab Results   Component Value Date    EGFR 19 06/18/2025    EGFR 20 06/16/2025    EGFR 21 06/15/2025    CREATININE 3.29 (H) 06/18/2025    CREATININE 3.22 (H) 06/16/2025    CREATININE 3.05 (H) 06/15/2025   Seen by nephrology on his previous admission, last seen on 6/6/2025  Per nephro: Baseline creatinine has been from 1.5-2.0 but on last admission his creatinine plateaued at 2.8-3.0 which may represent his new baseline due to his previous advanced CKD and recent ZENAIDA  He is high risk for dialysis in the near future  Nephrology consult is greatly appreciated.  The patient has been started on octreotide.

## 2025-06-19 LAB
ANION GAP SERPL CALCULATED.3IONS-SCNC: 11 MMOL/L (ref 4–13)
BUN SERPL-MCNC: 42 MG/DL (ref 5–25)
CALCIUM SERPL-MCNC: 9.1 MG/DL (ref 8.4–10.2)
CHLORIDE SERPL-SCNC: 104 MMOL/L (ref 96–108)
CO2 SERPL-SCNC: 23 MMOL/L (ref 21–32)
CREAT SERPL-MCNC: 3.27 MG/DL (ref 0.6–1.3)
ERYTHROCYTE [DISTWIDTH] IN BLOOD BY AUTOMATED COUNT: 17 % (ref 11.6–15.1)
GFR SERPL CREATININE-BSD FRML MDRD: 20 ML/MIN/1.73SQ M
GLUCOSE SERPL-MCNC: 123 MG/DL (ref 65–140)
GLUCOSE SERPL-MCNC: 279 MG/DL (ref 65–140)
GLUCOSE SERPL-MCNC: 300 MG/DL (ref 65–140)
GLUCOSE SERPL-MCNC: 329 MG/DL (ref 65–140)
GLUCOSE SERPL-MCNC: 421 MG/DL (ref 65–140)
HCT VFR BLD AUTO: 23.8 % (ref 36.5–49.3)
HGB BLD-MCNC: 8.1 G/DL (ref 12–17)
MCH RBC QN AUTO: 31.8 PG (ref 26.8–34.3)
MCHC RBC AUTO-ENTMCNC: 34 G/DL (ref 31.4–37.4)
MCV RBC AUTO: 93 FL (ref 82–98)
PLATELET # BLD AUTO: 193 THOUSANDS/UL (ref 149–390)
PMV BLD AUTO: 12.7 FL (ref 8.9–12.7)
POTASSIUM SERPL-SCNC: 5 MMOL/L (ref 3.5–5.3)
RBC # BLD AUTO: 2.55 MILLION/UL (ref 3.88–5.62)
SODIUM SERPL-SCNC: 138 MMOL/L (ref 135–147)
WBC # BLD AUTO: 12.3 THOUSAND/UL (ref 4.31–10.16)

## 2025-06-19 PROCEDURE — 82948 REAGENT STRIP/BLOOD GLUCOSE: CPT

## 2025-06-19 PROCEDURE — 80048 BASIC METABOLIC PNL TOTAL CA: CPT | Performed by: INTERNAL MEDICINE

## 2025-06-19 PROCEDURE — 99232 SBSQ HOSP IP/OBS MODERATE 35: CPT | Performed by: INTERNAL MEDICINE

## 2025-06-19 PROCEDURE — 85027 COMPLETE CBC AUTOMATED: CPT | Performed by: INTERNAL MEDICINE

## 2025-06-19 RX ORDER — LACTULOSE 10 G/15ML
20 SOLUTION ORAL 2 TIMES DAILY
Status: DISCONTINUED | OUTPATIENT
Start: 2025-06-19 | End: 2025-06-20 | Stop reason: HOSPADM

## 2025-06-19 RX ORDER — INSULIN LISPRO 100 [IU]/ML
2-12 INJECTION, SOLUTION INTRAVENOUS; SUBCUTANEOUS
Status: DISCONTINUED | OUTPATIENT
Start: 2025-06-19 | End: 2025-06-20 | Stop reason: HOSPADM

## 2025-06-19 RX ORDER — POLYETHYLENE GLYCOL 3350 17 G/17G
17 POWDER, FOR SOLUTION ORAL 2 TIMES DAILY
Status: DISCONTINUED | OUTPATIENT
Start: 2025-06-19 | End: 2025-06-19

## 2025-06-19 RX ORDER — INSULIN ASPART 100 [IU]/ML
10 INJECTION, SUSPENSION SUBCUTANEOUS
Status: DISCONTINUED | OUTPATIENT
Start: 2025-06-19 | End: 2025-06-19

## 2025-06-19 RX ORDER — INSULIN ASPART 100 [IU]/ML
12 INJECTION, SUSPENSION SUBCUTANEOUS
Status: DISCONTINUED | OUTPATIENT
Start: 2025-06-19 | End: 2025-06-20 | Stop reason: HOSPADM

## 2025-06-19 RX ADMIN — OCTREOTIDE ACETATE 100 MCG: 100 INJECTION, SOLUTION INTRAVENOUS; SUBCUTANEOUS at 13:18

## 2025-06-19 RX ADMIN — ALBUMIN (HUMAN) 25 G: 0.25 INJECTION, SOLUTION INTRAVENOUS at 21:52

## 2025-06-19 RX ADMIN — PANCRELIPASE 24000 UNITS: 120000; 24000; 76000 CAPSULE, DELAYED RELEASE PELLETS ORAL at 17:18

## 2025-06-19 RX ADMIN — PANTOPRAZOLE SODIUM 40 MG: 40 TABLET, DELAYED RELEASE ORAL at 17:00

## 2025-06-19 RX ADMIN — SODIUM BICARBONATE 650 MG TABLET 1300 MG: at 08:48

## 2025-06-19 RX ADMIN — PANTOPRAZOLE SODIUM 40 MG: 40 TABLET, DELAYED RELEASE ORAL at 06:10

## 2025-06-19 RX ADMIN — ALBUMIN (HUMAN) 25 G: 0.25 INJECTION, SOLUTION INTRAVENOUS at 13:19

## 2025-06-19 RX ADMIN — ALBUMIN (HUMAN) 25 G: 0.25 INJECTION, SOLUTION INTRAVENOUS at 06:10

## 2025-06-19 RX ADMIN — POLYETHYLENE GLYCOL 3350 17 G: 17 POWDER, FOR SOLUTION ORAL at 11:04

## 2025-06-19 RX ADMIN — INSULIN LISPRO 3 UNITS: 100 INJECTION, SOLUTION INTRAVENOUS; SUBCUTANEOUS at 07:43

## 2025-06-19 RX ADMIN — INSULIN LISPRO 8 UNITS: 100 INJECTION, SOLUTION INTRAVENOUS; SUBCUTANEOUS at 17:18

## 2025-06-19 RX ADMIN — AMLODIPINE BESYLATE 5 MG: 5 TABLET ORAL at 17:18

## 2025-06-19 RX ADMIN — OCTREOTIDE ACETATE 100 MCG: 100 INJECTION, SOLUTION INTRAVENOUS; SUBCUTANEOUS at 21:52

## 2025-06-19 RX ADMIN — PANCRELIPASE 24000 UNITS: 120000; 24000; 76000 CAPSULE, DELAYED RELEASE PELLETS ORAL at 07:43

## 2025-06-19 RX ADMIN — OCTREOTIDE ACETATE 100 MCG: 100 INJECTION, SOLUTION INTRAVENOUS; SUBCUTANEOUS at 06:10

## 2025-06-19 RX ADMIN — INSULIN LISPRO 5 UNITS: 100 INJECTION, SOLUTION INTRAVENOUS; SUBCUTANEOUS at 11:04

## 2025-06-19 RX ADMIN — CARVEDILOL 6.25 MG: 6.25 TABLET, FILM COATED ORAL at 07:42

## 2025-06-19 RX ADMIN — SODIUM BICARBONATE 650 MG TABLET 1300 MG: at 17:18

## 2025-06-19 RX ADMIN — PANCRELIPASE 24000 UNITS: 120000; 24000; 76000 CAPSULE, DELAYED RELEASE PELLETS ORAL at 11:04

## 2025-06-19 RX ADMIN — INSULIN ASPART 12 UNITS: 100 INJECTION, SUSPENSION SUBCUTANEOUS at 17:18

## 2025-06-19 RX ADMIN — FOLIC ACID 1 MG: 1 TABLET ORAL at 08:48

## 2025-06-19 RX ADMIN — CARVEDILOL 6.25 MG: 6.25 TABLET, FILM COATED ORAL at 17:18

## 2025-06-19 RX ADMIN — THIAMINE HCL TAB 100 MG 100 MG: 100 TAB at 08:48

## 2025-06-19 RX ADMIN — AMLODIPINE BESYLATE 5 MG: 5 TABLET ORAL at 08:48

## 2025-06-19 RX ADMIN — LACTULOSE 20 G: 20 SOLUTION ORAL at 17:18

## 2025-06-19 NOTE — ASSESSMENT & PLAN NOTE
GI following.  S/p paracentesis x 2.   Most recent was 6/18/25 with 5.7 liters drained.   GI looking into transplant evaluation.

## 2025-06-19 NOTE — UTILIZATION REVIEW
Continued Stay Review    Date: 6/19                          Current Patient Class: Inpatient  Current Level of Care: MS    HPI:55 y.o. male initially admitted on 6/12   Current Diagnosis: Alcoholic hepatitis with ascites, coffee ground emesis, Melena, anemia, constipation    Assessment/Plan:   Asymptomatic, hemodynamically stable today on exam. No asterixis, Mad[place]y < 312, off steroids, renal function improving. Paracentesis negative for SBP.  Continue PPI BID<   Renal function stable, poss last day for IV Albumin.  Continue Octreotide.  On exam less distended abd, no BLE edema. Continues with leukocytosis, downtrending BUN/Cr.      Medications:   Scheduled Medications:  Albumin 25%, 25 g, Intravenous, Q8H  amLODIPine, 5 mg, Oral, BID  carvedilol, 6.25 mg, Oral, BID With Meals  insulin aspart protamine-insulin aspart, 12 Units, Subcutaneous, BID AC  insulin lispro, 2-12 Units, Subcutaneous, TID AC  insulin lispro, 2-12 Units, Subcutaneous, HS  lactulose, 20 g, Oral, BID  octreotide, 100 mcg, Subcutaneous, Q8H JAISON  pancrelipase (Lip-Prot-Amyl), 24,000 Units, Oral, TID With Meals  pantoprazole, 40 mg, Oral, BID AC  sodium bicarbonate, 1,300 mg, Oral, TID after meals      Continuous IV Infusions:     PRN Meds:  acetaminophen, 325 mg, Oral, Q8H PRN  bisacodyl, 10 mg, Rectal, Daily PRN  HYDROmorphone, 0.2 mg, Intravenous, Q3H PRN  trimethobenzamide, 200 mg, Intramuscular, Q6H PRN      Discharge Plan: TBD    Vital Signs (last 3 days)       Date/Time Temp Pulse Resp BP MAP (mmHg) SpO2 O2 Device Patient Position - Orthostatic VS Liliana Coma Scale Score Pain    06/19/25 15:16:53 98.9 °F (37.2 °C) 79 16 161/82 108 94 % None (Room air) Lying -- --    06/19/25 10:58:59 98.4 °F (36.9 °C) 73 16 150/74 99 96 % -- -- -- --    06/19/25 0800 -- -- -- -- -- -- -- -- 15 No Pain    06/19/25 07:11:37 98.7 °F (37.1 °C) 78 17 153/72 99 95 % -- Lying -- --    06/19/25 0600 -- 73 -- -- -- 96 % -- -- -- --    06/18/25 21:22:13 99 °F (37.2  °C) 71 -- 157/77 104 94 % -- -- -- --    06/18/25 2046 -- -- -- -- -- -- -- -- 15 No Pain    06/18/25 16:02:15 98.7 °F (37.1 °C) 68 18 142/69 93 97 % -- -- -- --    06/18/25 15:36:02 -- 68 15 151/78 -- 99 % -- -- -- --    06/18/25 11:45:20 -- 65 16 139/64 89 94 % -- -- -- --    06/18/25 07:30:43 98.2 °F (36.8 °C) 77 16 146/75 99 95 % None (Room air) Lying -- No Pain    06/18/25 03:15:26 -- -- 18 143/73 96 -- -- -- -- --    06/17/25 2000 -- -- -- -- -- -- None (Room air) -- -- --    06/17/25 19:09:36 98.1 °F (36.7 °C) -- -- 129/66 87 -- -- -- -- --    06/17/25 15:24:42 98.2 °F (36.8 °C) 68 -- 155/72 100 95 % -- -- -- --    06/17/25 11:03:03 98.4 °F (36.9 °C) 66 -- -- -- 95 % -- -- -- --    06/17/25 0900 -- -- -- -- -- -- -- -- -- 5    06/16/25 2308 -- -- -- -- -- -- -- -- -- 8    06/16/25 21:04:59 97.5 °F (36.4 °C) 67 18 136/68 91 96 % None (Room air) -- -- --    06/16/25 2000 -- -- -- -- -- -- None (Room air) -- -- --    06/16/25 15:23:39 98.8 °F (37.1 °C) 73 20 132/78 96 97 % None (Room air) -- -- --    06/16/25 1344 -- -- -- -- -- -- -- -- -- 8    06/16/25 12:20:47 97.6 °F (36.4 °C) 73 -- 122/61 81 97 % -- -- -- --    06/16/25 0900 -- -- -- -- -- 95 % None (Room air) -- -- 7    06/16/25 07:27:50 97.9 °F (36.6 °C) 75 18 147/68 94 95 % None (Room air) Lying -- --    06/16/25 0606 -- -- -- -- -- -- -- -- -- 5    06/16/25 0104 -- -- -- 141/72 -- -- -- -- -- --          Weight (last 2 days)       None            Pertinent Labs/Diagnostic Results:   Radiology:  IR INPATIENT Paracentesis   Final Interpretation by Trever Zimmer MD (06/18 7290)   Ultrasound guided right paracentesis.            Signed, performed, and dictated by ARIANE Torres      Supervising Physician: Dr. Zimmer               Workstation performed: [id number]         XR abdomen 1 view kub   Final Interpretation by Liu Castillo DO (06/19 1615)      1.  Nonobstructive bowel gas pattern.   2.  Distended abdomen with centralization of the visualized  bowel gas suggestive of moderate to large volume ascites.               Workstation performed: [id number]         IR INPATIENT Paracentesis   Final Interpretation by Vishnu Doshi MD (06/13 1239)   Ultrasound-guided paracentesis.      Signed and dictated by Marcia Dubois PA-C under the supervision of Dr. Doshi         Workstation performed: [id number]         CT chest abdomen pelvis wo contrast   Final Interpretation by Brodie Mahoney MD (06/12 2006)      Large volume abdominopelvic ascites, increased since 6/3/2025.      Unchanged findings of chronic pancreatitis.            Workstation performed: [id number]         XR abdomen obstruction series   Final Interpretation by Piero Kaiser MD (06/13 0832)      Relative paucity of bowel gas. Otherwise no definite evidence for bowel obstruction. Increasing haziness of the abdomen is consistent with ascites on follow-up CT.         Workstation performed: [id number]           Cardiology:  No orders to display     GI:  EGD   Final Result by Scotty Brito MD (06/13 5164)   Grade D esophagitis   Schatzki ring in the GE junction   Polyp in the fundus of the stomach   The cardia, body of the stomach, incisura and antrum appeared normal.   The duodenal bulb, 1st part of the duodenum and 2nd part of the duodenum    appeared normal.   No esophageal or gastric varices noted.          RECOMMENDATION:   Return to floor   Resume normal diet   Transition to PO PPI twice daily   Suspect coffee-ground emesis in setting of severe esophagitis   Can start Ceftriaxone x5 days for SBP prophylaxis in the setting of GI    bleed                     Scotty Brito MD               Results from last 7 days   Lab Units 06/19/25  0500 06/16/25  0506 06/15/25  0523 06/14/25  0447 06/13/25  0553   WBC Thousand/uL 12.30* 12.51* 12.40* 16.16* 11.44*   HEMOGLOBIN g/dL 8.1* 8.8* 8.5* 6.9* 7.6*   HEMATOCRIT % 23.8* 25.1* 24.2* 19.7* 21.7*   PLATELETS Thousands/uL 193 186 178 198 209    TOTAL NEUT ABS Thousands/µL  --   --   --  12.57* 8.93*         Results from last 7 days   Lab Units 06/19/25  0500 06/18/25  0624 06/16/25  0506 06/15/25  0523 06/14/25  0447   SODIUM mmol/L 138 135 136 135 135   POTASSIUM mmol/L 5.0 5.3 4.3 4.8 5.1   CHLORIDE mmol/L 104 104 107 107 108   CO2 mmol/L 23 19* 20* 20* 19*   ANION GAP mmol/L 11 12 9 8 8   BUN mg/dL 42* 43* 39* 38* 37*   CREATININE mg/dL 3.27* 3.29* 3.22* 3.05* 2.81*   EGFR ml/min/1.73sq m 20 19 20 21 24   CALCIUM mg/dL 9.1 9.0 9.2 8.7 9.0     Results from last 7 days   Lab Units 06/18/25  0624 06/16/25  0506 06/15/25  0523 06/14/25  0447 06/13/25  0553 06/12/25  1714   AST U/L 35 38 35 36 33 54*   ALT U/L 22 23 26 27 33 36   ALK PHOS U/L 267* 278* 295* 292* 360* 348*   TOTAL PROTEIN g/dL 5.7* 5.4* 5.2* 5.5* 5.7* 5.5*   ALBUMIN g/dL 3.7 3.6 3.2* 3.5 3.3* 3.4*   TOTAL BILIRUBIN mg/dL 13.60* 12.57* 12.88* 12.79* 14.20* 13.98*   AMMONIA umol/L  --   --   --   --   --  47     Results from last 7 days   Lab Units 06/19/25  1610 06/19/25  1100 06/19/25  0710 06/18/25  2120 06/18/25  1605 06/18/25  1146 06/18/25  0730 06/17/25  2101 06/17/25  1101 06/17/25  0652 06/16/25  2106 06/16/25  2038   POC GLUCOSE mg/dl 300* 421* 329* 207* 136 228* 266* 245* 142* 168* 73 68     Results from last 7 days   Lab Units 06/19/25  0500 06/18/25  0624 06/16/25  0506 06/15/25  0523 06/14/25  0447 06/13/25  0553 06/12/25  1714   GLUCOSE RANDOM mg/dL 279* 247* 81 207* 287* 260* 315*             Beta- Hydroxybutyrate   Date Value Ref Range Status   05/23/2025 0.21 0.20 - 0.60 mmol/L Final   04/26/2025 <0.05 0.02 - 0.27 mmol/L Final   02/05/2025 <0.05 0.02 - 0.27 mmol/L Final                              Results from last 7 days   Lab Units 06/18/25  0624 06/16/25  0506 06/13/25  0553   PROTIME seconds 16.3* 15.9* 16.2*   INR  1.29* 1.26* 1.28*                                     Results from last 7 days   Lab Units 06/15/25  0530   UNIT PRODUCT CODE  [id number]   UNIT NUMBER   [id number]   UNITABO  A   UNITRH  POS   CROSSMATCH  Compatible   UNIT DISPENSE STATUS  Presumed Trans   UNIT PRODUCT VOL ml 350         Results from last 7 days   Lab Units 06/12/25  1714   LIPASE u/L <6*                 Results from last 7 days   Lab Units 06/18/25  2317   CLARITY UA  Clear   COLOR UA  Dark Yellow   SPEC GRAV UA  1.018   PH UA  6.0   GLUCOSE UA mg/dl Negative   KETONES UA mg/dl Negative   BLOOD UA  Negative   PROTEIN UA mg/dl 300 (3+)*   NITRITE UA  Negative   BILIRUBIN UA  Moderate*   UROBILINOGEN UA (BE) mg/dl 2.0*   LEUKOCYTES UA  Negative   WBC UA /hpf 1-2   RBC UA /hpf 1-2   BACTERIA UA /hpf None Seen   EPITHELIAL CELLS WET PREP /hpf None Seen   SODIUM UR mmol/L 57.0                                 Results from last 7 days   Lab Units 06/19/25  1121 06/13/25  1005   GRAM STAIN RESULT  Rare Polys  No organisms seen Rare Polys  No bacteria seen   BODY FLUID CULTURE, STERILE  No growth No growth     Results from last 7 days   Lab Units 06/18/25  1509 06/13/25  1005   TOTAL COUNTED  100 100   WBC FLUID /ul 140 166               Network Utilization Review Department  ATTENTION: Please call with any questions or concerns to [phone] and carefully listen to the prompts so that you are directed to the right person. All voicemails are confidential.   For Discharge needs, contact Care Management DC Support Team at [phone] opt. 2  Send all requests for admission clinical reviews, approved or denied determinations and any other requests to dedicated fax number below belonging to the campus where the patient is receiving treatment. List of dedicated fax numbers for the Facilities:  FACILITY NAME UR FAX NUMBER   ADMISSION DENIALS (Administrative/Medical Necessity) [phone]   DISCHARGE SUPPORT TEAM (NETWORK) [phone]   PARENT CHILD HEALTH (Maternity/NICU/Pediatrics) [phone]   [facility] [phone]   [facility] [phone]    [facility] [phone]   [facility] [phone]   [facility] [phone]   [facility] [phone]   [facility] [phone]   [facility] [phone]   [facility] [phone]   [facility] [phone]   [facility] [phone]   [facility] [phone]

## 2025-06-19 NOTE — ASSESSMENT & PLAN NOTE
BP is slightly high but acceptable.   Current Rx: Amlodipine 5 mg BID, Carvedilol 6.25 mg BID.   Hydralazine stopped on 6/16/25  No changes .

## 2025-06-19 NOTE — PROGRESS NOTES
Progress Note - Hospitalist   Name: Wes Araujo 55 y.o. male I MRN: [id number]  Unit/Bed#: [place] [address] -01 I Date of Admission: 6/12/2025   Date of Service: 6/19/2025 I Hospital Day: 7    Assessment & Plan  Alcoholic hepatitis with ascites  Patient is a 55-year-old male with a past medical history of alcohol abuse, alcoholic liver cirrhosis, anemia, hypertension, type 2 diabetes, history of atrial fibrillation, chronic pancreatitis who presented to the hospital with abdominal pain and distention.  Overnight on the day of admission developed coffee-ground emesis and melena  Patient was admitted from 5/23/2025 to 6/6/2025 secondary to toxic metabolic encephalopathy secondary to hyperglycemia, HHNK, kidney injury and acute liver failure.  Received insulin, IV fluids, seen by gastroenterology, DF did not meet criteria for steroids, infection was ruled out, initial plan was for patient to be discharged to inpatient alcoholic rehab but patient refused and he was discharged home    6/12/2025 CT showed large volume ascites, increased since 6/3/2025   6/13/2025 underwent paracentesis 1670 cc clear fluid removed, no evidence of SBP, fluid cultures are pending  6/13/2025 underwent EGD which showed esophagitis.  No esophageal or gastric varices.  Ceftriaxone for 5 days for SBP prophylaxis in the setting of GI bleed  No indication for prednisolone  Due to worsening of kidney function the patient has been treated with albumin and octreotide.  Followed by GI  The patient underwent paracentesis June 18th for 5700 cc of fluid.  Coffee ground emesis  Night of admission 6/12/2025 developed coffee-ground emesis and melena  6/13/2025 underwent EGD which showed esophagitis, no esophageal or gastric varices.  Rest of the exam normal  The patient is being treated with pantoprazole.  He has had no further bleeding.  Melena  Secondary to severe esophagitis  See plan above  Anemia  Partly related to recent GI bleeding.  Bleeding has  abated.  Monitor hemoglobin.  CKD (chronic kidney disease) stage 4, GFR 15-29 ml/min ([facility])  Lab Results   Component Value Date    EGFR 20 06/19/2025    EGFR 19 06/18/2025    EGFR 20 06/16/2025    CREATININE 3.27 (H) 06/19/2025    CREATININE 3.29 (H) 06/18/2025    CREATININE 3.22 (H) 06/16/2025   Seen by nephrology on his previous admission, last seen on 6/6/2025  Per nephro: Baseline creatinine has been from 1.5-2.0 but on last admission his creatinine plateaued at 2.8-3.0 which may represent his new baseline due to his previous advanced CKD and recent ZENAIDA  He is high risk for dialysis in the near future  Nephrology consult is greatly appreciated.  The patient has been started on octreotide.  Alcohol abuse  Pt with history of alcohol abuse with withdrawal. He states he typically drinks multiple beer a day, but states he has not been drinking since he was discharged from the hospital last week  ETOH <10  Monitor on CIWA for now  Vitamin supplementation  Encourage continued cessation  Type 2 diabetes mellitus with hyperglycemia, with long-term current use of insulin ([facility])  Lab Results   Component Value Date    HGBA1C 9.6 (H) 05/26/2025       Recent Labs     06/18/25  1605 06/18/25  2120 06/19/25  0710 06/19/25  1100   POCGLU 136 207* 329* 421*       HHNK on his previous admission due to noncompliance and alcohol abuse  Home regimen is 70/30, 8 units twice daily, which was held due to coffee-ground emesis  The patient is still hyperglycemic.  His insulin has been adjusted.  Generalized weakness  Pt reports that since discharge he has been having difficulty with ambulation  Typically he ambulates without assistive devices, but he reports he has been struggling  PT/OT eval  Primary hypertension  Continue amlodipine 5 mg twice daily, hydralazine 25 mg 3 times daily, and Coreg 6.25 twice daily with holding parameters  Chronic pancreatitis ([facility])  Patient with a history of chronic pancreatitis secondary to alcohol   Chronic  pancreatitis stable on CT imaging on admission    History of atrial fibrillation  History of atrial fibrillation, continue Coreg  Not on anticoagulation at baseline  Constipation  Pt reports no bowel movement for 6 days  Bowel regimen initiated  Patient blames this on his medications.  I did try to simplify his regimen.  Lactulose has been added.  Severe protein-calorie malnutrition (HCC)  Malnutrition Findings:   Adult Malnutrition type: Acute illness  Adult Degree of Malnutrition: Other severe protein calorie malnutrition  Malnutrition Characteristics: Fat loss, Muscle loss, Inadequate energy     360 Statement: Severe calorie protein malnutrition in context of acute illness r/t inadequte PO intake in setting of abdominal distention/ pain as evidenced by  moderate body fat (triceps, ribcage area) and muscle mass depletions (temporal wasting, protruding clavicles) energy intake less than 50% compared to estimated needs>5 days; Treated with oral supplements    BMI Findings:    Body mass index is 21 kg/m².       Subjective:  The patient complains that he has not been able to go to the bathroom.  Despite speaking to him through a , I was unable to determine if he was having trouble urinating or trouble moving his bowels.  He has had issues with constipation before.  He is blaming this on his medications but I am not sure which 1.  He denies any chest pain, shortness of breath, or nausea.    Physical Exam:   Temp:  [98.4 °F (36.9 °C)-99 °F (37.2 °C)] 98.9 °F (37.2 °C)  HR:  [71-79] 79  BP: (150-161)/(72-82) 161/82  Resp:  [16-17] 16  SpO2:  [94 %-96 %] 94 %  O2 Device: None (Room air)    Gen: Well-developed, chronically ill, in no acute distress.  Neck: Supple.  No lymphadenopathy or goiter.  Heart: Regular rhythm.  I heard no murmur, gallop, or rub.  Lungs: Clear to auscultation and percussion.  No wheezing, rales, or rhonchi.  Abd: Soft with active bowel sounds.  No mass or tenderness.  Extremities: No  clubbing, cyanosis, or edema.  No calf tenderness.  Neuro: Alert and conversant.  No focal sign.  Skin: Warm and dry.      LABS:   CBC:   Lab Results   Component Value Date    WBC 12.30 (H) 06/19/2025    HGB 8.1 (L) 06/19/2025    HCT 23.8 (L) 06/19/2025    MCV 93 06/19/2025     06/19/2025    RBC 2.55 (L) 06/19/2025    MCH 31.8 06/19/2025    MCHC 34.0 06/19/2025    RDW 17.0 (H) 06/19/2025    MPV 12.7 06/19/2025   , CMP:   Lab Results   Component Value Date    SODIUM 138 06/19/2025    K 5.0 06/19/2025     06/19/2025    CO2 23 06/19/2025    BUN 42 (H) 06/19/2025    CREATININE 3.27 (H) 06/19/2025    CALCIUM 9.1 06/19/2025    EGFR 20 06/19/2025         VTE Pharmacologic Prophylaxis: Sequential compression device (Venodyne)   VTE Mechanical Prophylaxis: sequential compression device

## 2025-06-19 NOTE — PHYSICAL THERAPY NOTE
Physical Therapy Cancellation Note      PT session cancelled as pt. Refused reporting he has abdominal pain and did not want to do anything. Visibly no discomfort noted in patient. Will continue to follow as able.

## 2025-06-19 NOTE — ASSESSMENT & PLAN NOTE
Pt reports no bowel movement for 6 days  Bowel regimen initiated  Patient blames this on his medications.  I did try to simplify his regimen.  Lactulose has been added.

## 2025-06-19 NOTE — ASSESSMENT & PLAN NOTE
Lab Results   Component Value Date    HGBA1C 9.6 (H) 05/26/2025       Recent Labs     06/18/25  1605 06/18/25  2120 06/19/25  0710 06/19/25  1100   POCGLU 136 207* 329* 421*       HHNK on his previous admission due to noncompliance and alcohol abuse  Home regimen is 70/30, 8 units twice daily, which was held due to coffee-ground emesis  The patient is still hyperglycemic.  His insulin has been adjusted.

## 2025-06-19 NOTE — PROGRESS NOTES
Progress Note - Gastroenterology   Name: Wes Araujo 55 y.o. male I MRN: [id number]  Unit/Bed#: [place] [address] -01 I Date of Admission: 6/12/2025   Date of Service: 6/19/2025 I Hospital Day: 7    Assessment & Plan  Alcoholic hepatitis with ascites  Alcohol abuse  Coffee ground emesis  Melena  55-year-old male with a past medical history of hypertension, diabetes mellitus type 2, chronic pancreatitis secondary to chronic alcohol use and recent admission for alc hep with likely ZENAIDA on CKD (creatinine 3.01 at discharge during previous hospitalization, previously 0.8-1 in 2024 but more recently 1.7-2) who presented to [facility] on 6/12 for concern of recurrent abdominal distension with associated pain in the setting of constipation.   Labs on presentation significant for continued improvement in AST/ALT/ALP since last admission, however, bilirubin continues to increase.  INR 1.18 on presentation (peaked last admission at 5.53 with dramatic improvement after Vitamin K). Kidney function appeared to be improving from previous admission at which time creatinine peaked at 3.01, but is now worsening rising to 3.29 today.    Patient previously underwent serologic liver workup negative for alternative cause of liver injury with elevations thought to be 2/2 alc hep in setting of some degree of underlying fibrosis.  Patient was treated with conservative management without steroids due to low Maddrey's score.  Patient was recommended to follow-up with gastroenterology but now presenting with recurrent abdominal distention/pain along with coffee-ground emesis and melena on admission leading to EGD without evidence of varices, but showing grade D esophagitis.  Hospitalization further complicated by continued abdominal pain likely in the setting of constipation as well as worsening ZENAIDA with concern for HRS now on octreotide and albumin.    Maddrey's Discriminant Function - Control Prothrombin 13: 28.78 at 6/18/2025  6:24 AM  Calculated  from:  Total Bilirubin: 13.6 mg/dL at 6/18/2025  6:24 AM  Prothrombin Time: 16.3 seconds at 6/18/2025  6:24 AM  MDF = (4.6 * (PT - Control PT)) + Bilirubin     MELD 3.0: 32 at 6/19/2025  5:00 AM  MELD-Na: 31 at 6/19/2025  5:00 AM  Calculated from:  Serum Creatinine: 3.27 mg/dL (Using max of 3 mg/dL) at 6/19/2025  5:00 AM  Serum Sodium: 138 mmol/L (Using max of 137 mmol/L) at 6/19/2025  5:00 AM  Total Bilirubin: 13.6 mg/dL at 6/18/2025  6:24 AM  Serum Albumin: 3.7 g/dL (Using max of 3.5 g/dL) at 6/18/2025  6:24 AM  INR(ratio): 1.29 at 6/18/2025  6:24 AM  Age at listing (hypothetical): 55 years  Sex: Male at 6/19/2025  5:00 AM    # Elevated Liver Chemistries - Alcohol hepatitis:   Mild downtrend in LFTs today: AST/ALT 35/26, , and total bilirubin 12.88  Monitor and trend LFTs daily along with INR; no indication for prednisolone  Avoid hepatotoxic medications, strongly encourage complete alcohol cessation  Consider liver biopsy outpatient given significant alkaline phosphatase elevation prior to admission  Recommend complete alcohol cessation moving forward.  Will check Pet this patient states he has not consumed alcohol since before hospitalization last month when he presented on 5/23  Additional pain and symptom management per primary team    # Ascites with ZENAIDA/CKD:   Status post IR paracentesis on 6/13/2025 with removal of 1670 cc, negative for SBP  Studies not consistent with SBP however patient with increasing serum creatinine  Repeat paracentesis on 6/18 with removal of 5700 cc without evidence of SBP  Continue 25% albumin 25 g x3 doses due to worsening creatinine; diuretics currently on hold.  No need for midodrine as MAPs adequate  Nephrology following  Monitor and trend serum creatinine and electrolytes daily, monitor urine output    # Coffee Ground Emesis and Melena:  Status post EGD 6/13/2025 with evidence of grade D esophagitis; history of gastric dieulafoy lesion as well 2/2025  Continue on  Protonix 40 mg twice daily by mouth  Status post ceftriaxone x 5 days for SBP prophylaxis in setting of GI bleed and severe alcohol hepatitis  Monitor and trend hemoglobin, transfuse for goal greater than 7  Alert GI team of any concern for recurrent GI bleeding    # Transplant  Patient with history of alcoholic hepatitis and now concern for HRS which may potentially need for transplant.  He has recent alcohol use within the past 3 weeks  Follow up Peth from 6/18  Discussed concern with family at bedside including parents and brother.  He has a good support system from all family members including parents who he lives with.  They have encouraged him recently to stop alcohol consumption and he is now adamant that he will not drink again  No previous DUIs or legal trouble associated with drinking.  No previous stays in alcohol rehabilitation  CKD (chronic kidney disease) stage 4, GFR 15-29 ml/min ([facility])  Lab Results   Component Value Date    EGFR 20 06/19/2025    EGFR 19 06/18/2025    EGFR 20 06/16/2025    CREATININE 3.27 (H) 06/19/2025    CREATININE 3.29 (H) 06/18/2025    CREATININE 3.22 (H) 06/16/2025     Electrolyte abnormality    Severe protein-calorie malnutrition (HCC)  Malnutrition Findings:   Adult Malnutrition type: Acute illness  Adult Degree of Malnutrition: Other severe protein calorie malnutrition  Malnutrition Characteristics: Fat loss, Muscle loss, Inadequate energy                  360 Statement: Severe calorie protein malnutrition in context of acute illness r/t inadequte PO intake in setting of abdominal distention/ pain as evidenced by  moderate body fat (triceps, ribcage area) and muscle mass depletions (temporal wasting, protruding clavicles) energy intake less than 50% compared to estimated needs>5 days; Treated with oral supplements    BMI Findings:           Body mass index is 21 kg/m².     Constipation  Last bowel movement prior to presentation 6/7 per patient report. Plan for GoLytely bowel  prep.  - Continue bowel regimen with MiraLAX twice daily, senna twice daily    I have discussed the above management plan in detail with the primary service.     Subjective   Patient alert and oriented x 3 without asterixis.  Much more interactive today.  Pain improved after paracentesis and having bowel movements overnight, but continues to reiterate to me with assistance of  that he is worried he is going become constipated again.    Objective :  Temp:  [98.7 °F (37.1 °C)-99 °F (37.2 °C)] 98.7 °F (37.1 °C)  HR:  [65-78] 78  BP: (139-157)/(64-78) 153/72  Resp:  [15-18] 17  SpO2:  [94 %-99 %] 95 %    Physical Exam  Vitals and nursing note reviewed.   Constitutional:       Appearance: He is well-developed and normal weight.   HENT:      Head: Atraumatic.     Eyes:      Conjunctiva/sclera: Conjunctivae normal.     Abdominal:      General: There is no distension.      Palpations: Abdomen is soft.      Tenderness: There is no abdominal tenderness. There is no guarding or rebound.     Skin:     General: Skin is warm and dry.      Capillary Refill: Capillary refill takes less than 2 seconds.     Psychiatric:         Mood and Affect: Mood normal.           Lab Results: I have reviewed the following results:none    Imaging Results Review: No pertinent imaging studies reviewed.  Other Study Results Review: No additional pertinent studies reviewed.    Administrative Statements   I have spent a total time of 32 minutes in caring for this patient on the day of the visit/encounter including Diagnostic results, Prognosis, Risks and benefits of tx options, Instructions for management, and Patient and family education.

## 2025-06-19 NOTE — ASSESSMENT & PLAN NOTE
55-year-old male with a past medical history of hypertension, diabetes mellitus type 2, chronic pancreatitis secondary to chronic alcohol use and recent admission for alc hep with likely ZENAIDA on CKD (creatinine 3.01 at discharge during previous hospitalization, previously 0.8-1 in 2024 but more recently 1.7-2) who presented to [facility] on 6/12 for concern of recurrent abdominal distension with associated pain in the setting of constipation.   Labs on presentation significant for continued improvement in AST/ALT/ALP since last admission, however, bilirubin continues to increase.  INR 1.18 on presentation (peaked last admission at 5.53 with dramatic improvement after Vitamin K). Kidney function appeared to be improving from previous admission at which time creatinine peaked at 3.01, but is now worsening rising to 3.29 today.    Patient previously underwent serologic liver workup negative for alternative cause of liver injury with elevations thought to be 2/2 alc hep in setting of some degree of underlying fibrosis.  Patient was treated with conservative management without steroids due to low Maddrey's score.  Patient was recommended to follow-up with gastroenterology but now presenting with recurrent abdominal distention/pain along with coffee-ground emesis and melena on admission leading to EGD without evidence of varices, but showing grade D esophagitis.  Hospitalization further complicated by continued abdominal pain likely in the setting of constipation as well as worsening ZENAIDA with concern for HRS now on octreotide and albumin.    Maddrey's Discriminant Function - Control Prothrombin 13: 28.78 at 6/18/2025  6:24 AM  Calculated from:  Total Bilirubin: 13.6 mg/dL at 6/18/2025  6:24 AM  Prothrombin Time: 16.3 seconds at 6/18/2025  6:24 AM  MDF = (4.6 * (PT - Control PT)) + Bilirubin     MELD 3.0: 32 at 6/19/2025  5:00 AM  MELD-Na: 31 at 6/19/2025  5:00 AM  Calculated from:  Serum Creatinine: 3.27 mg/dL (Using max of 3  mg/dL) at 6/19/2025  5:00 AM  Serum Sodium: 138 mmol/L (Using max of 137 mmol/L) at 6/19/2025  5:00 AM  Total Bilirubin: 13.6 mg/dL at 6/18/2025  6:24 AM  Serum Albumin: 3.7 g/dL (Using max of 3.5 g/dL) at 6/18/2025  6:24 AM  INR(ratio): 1.29 at 6/18/2025  6:24 AM  Age at listing (hypothetical): 55 years  Sex: Male at 6/19/2025  5:00 AM    # Elevated Liver Chemistries - Alcohol hepatitis:   Mild downtrend in LFTs today: AST/ALT 35/26, , and total bilirubin 12.88  Monitor and trend LFTs daily along with INR; no indication for prednisolone  Avoid hepatotoxic medications, strongly encourage complete alcohol cessation  Consider liver biopsy outpatient given significant alkaline phosphatase elevation prior to admission  Recommend complete alcohol cessation moving forward.  Will check Peth this patient states he has not consumed alcohol since before hospitalization last month when he presented on 5/23  Additional pain and symptom management per primary team    # Ascites with ZENAIDA/CKD:   Status post IR paracentesis on 6/13/2025 with removal of 1670 cc, negative for SBP  Studies not consistent with SBP however patient with increasing serum creatinine  Repeat paracentesis on 6/18 with removal of 5700 cc without evidence of SBP  Continue 25% albumin 25 g x3 doses due to worsening creatinine; diuretics currently on hold.  No need for midodrine as MAPs adequate  Nephrology following  Monitor and trend serum creatinine and electrolytes daily, monitor urine output    # Coffee Ground Emesis and Melena:  Status post EGD 6/13/2025 with evidence of grade D esophagitis; history of gastric dieulafoy lesion as well 2/2025  Continue on Protonix 40 mg twice daily by mouth  Status post ceftriaxone x 5 days for SBP prophylaxis in setting of GI bleed and severe alcohol hepatitis  Monitor and trend hemoglobin, transfuse for goal greater than 7  Alert GI team of any concern for recurrent GI bleeding    # Transplant  Patient with history  of alcoholic hepatitis and now concern for HRS which may potentially need for transplant.  He has recent alcohol use within the past 3 weeks  Follow up Peth from 6/18  Discussed concern with family at bedside including parents and brother.  He has a good support system from all family members including parents who he lives with.  They have encouraged him recently to stop alcohol consumption and he is now adamant that he will not drink again  No previous DUIs or legal trouble associated with drinking.  No previous stays in alcohol rehabilitation

## 2025-06-19 NOTE — ASSESSMENT & PLAN NOTE
55-year-old male with a past medical history of hypertension, diabetes mellitus type 2, chronic pancreatitis secondary to chronic alcohol use and recent admission for alc hep with likely ZENAIDA on CKD (creatinine 3.01 at discharge during previous hospitalization, previously 0.8-1 in 2024 but more recently 1.7-2) who presented to [facility] on 6/12 for concern of recurrent abdominal distension with associated pain in the setting of constipation.   Labs on presentation significant for continued improvement in AST/ALT/ALP since last admission, however, bilirubin continues to increase.  INR 1.18 on presentation (peaked last admission at 5.53 with dramatic improvement after Vitamin K). Kidney function appeared to be improving from previous admission at which time creatinine peaked at 3.01, but is now worsening rising to 3.29 today.    Patient previously underwent serologic liver workup negative for alternative cause of liver injury with elevations thought to be 2/2 alc hep in setting of some degree of underlying fibrosis.  Patient was treated with conservative management without steroids due to low Maddrey's score.  Patient was recommended to follow-up with gastroenterology but now presenting with recurrent abdominal distention/pain along with coffee-ground emesis and melena on admission leading to EGD without evidence of varices, but showing grade D esophagitis.  Hospitalization further complicated by continued abdominal pain likely in the setting of constipation as well as worsening ZENAIDA with concern for HRS now on octreotide and albumin.    Maddrey's Discriminant Function - Control Prothrombin 13: 28.78 at 6/18/2025  6:24 AM  Calculated from:  Total Bilirubin: 13.6 mg/dL at 6/18/2025  6:24 AM  Prothrombin Time: 16.3 seconds at 6/18/2025  6:24 AM  MDF = (4.6 * (PT - Control PT)) + Bilirubin     MELD 3.0: 32 at 6/19/2025  5:00 AM  MELD-Na: 31 at 6/19/2025  5:00 AM  Calculated from:  Serum Creatinine: 3.27 mg/dL (Using max of 3  mg/dL) at 6/19/2025  5:00 AM  Serum Sodium: 138 mmol/L (Using max of 137 mmol/L) at 6/19/2025  5:00 AM  Total Bilirubin: 13.6 mg/dL at 6/18/2025  6:24 AM  Serum Albumin: 3.7 g/dL (Using max of 3.5 g/dL) at 6/18/2025  6:24 AM  INR(ratio): 1.29 at 6/18/2025  6:24 AM  Age at listing (hypothetical): 55 years  Sex: Male at 6/19/2025  5:00 AM    # Elevated Liver Chemistries - Alcohol hepatitis:   Mild downtrend in LFTs today: AST/ALT 35/26, , and total bilirubin 12.88  Monitor and trend LFTs daily along with INR; no indication for prednisolone  Avoid hepatotoxic medications, strongly encourage complete alcohol cessation  Consider liver biopsy outpatient given significant alkaline phosphatase elevation prior to admission  Recommend complete alcohol cessation moving forward.  Will check Peth this patient states he has not consumed alcohol since before hospitalization last month when he presented on 5/23  Additional pain and symptom management per primary team    # Ascites with ZENAIDA/CKD:   Status post IR paracentesis on 6/13/2025 with removal of 1670 cc, negative for SBP  Studies not consistent with SBP however patient with increasing serum creatinine  Repeat paracentesis on 6/18 with removal of 5700 cc without evidence of SBP  Continue 25% albumin 25 g x3 doses due to worsening creatinine; diuretics currently on hold.  No need for midodrine as MAPs adequate  Nephrology following  Monitor and trend serum creatinine and electrolytes daily, monitor urine output    # Coffee Ground Emesis and Melena:  Status post EGD 6/13/2025 with evidence of grade D esophagitis; history of gastric dieulafoy lesion as well 2/2025  Continue on Protonix 40 mg twice daily by mouth  Status post ceftriaxone x 5 days for SBP prophylaxis in setting of GI bleed and severe alcohol hepatitis  Monitor and trend hemoglobin, transfuse for goal greater than 7  Alert GI team of any concern for recurrent GI bleeding    # Transplant  Patient with history  of alcoholic hepatitis and now concern for HRS which may potentially need for transplant.  He has recent alcohol use within the past 3 weeks  Follow up Peth from 6/18  Discussed concern with family at bedside including parents and brother.  He has a good support system from all family members including parents who he lives with.  They have encouraged him recently to stop alcohol consumption and he is now adamant that he will not drink again  No previous DUIs or legal trouble associated with drinking.  No previous stays in alcohol rehabilitation

## 2025-06-19 NOTE — OCCUPATIONAL THERAPY NOTE
Occupational Therapy Cancel Note:    Patient Name: Wes Araujo  Today's Date: 6/19/2025    Chart reviewed. Attempted to see pt for OT treatment session this AM, however, pt declining 2* increased pain. Will cx and complete OT session at later time as schedule permits.    Barby White, OTR/L

## 2025-06-19 NOTE — PLAN OF CARE
Problem: PAIN - ADULT  Goal: Verbalizes/displays adequate comfort level or baseline comfort level  Description: Interventions:  - Encourage patient to monitor pain and request assistance  - Assess pain using appropriate pain scale  - Administer analgesics as ordered based on type and severity of pain and evaluate response  - Implement non-pharmacological measures as appropriate and evaluate response  - Consider cultural and social influences on pain and pain management  - Notify physician/advanced practitioner if interventions unsuccessful or patient reports new pain  - Educate patient/family on pain management process including their role and importance of  reporting pain   - Provide non-pharmacologic/complimentary pain relief interventions  Outcome: Progressing     Problem: INFECTION - ADULT  Goal: Absence or prevention of progression during hospitalization  Description: INTERVENTIONS:  - Assess and monitor for signs and symptoms of infection  - Monitor lab/diagnostic results  - Monitor all insertion sites, i.e. indwelling lines, tubes, and drains  - Monitor endotracheal if appropriate and nasal secretions for changes in amount and color  - [place] appropriate cooling/warming therapies per order  - Administer medications as ordered  - Instruct and encourage patient and family to use good hand hygiene technique  - Identify and instruct in appropriate isolation precautions for identified infection/condition  Outcome: Progressing  Goal: Absence of fever/infection during neutropenic period  Description: INTERVENTIONS:  - Monitor WBC  - Perform strict hand hygiene  - Limit to healthy visitors only  - No plants, dried, fresh or silk flowers with mcclure in patient room  Outcome: Progressing     Problem: SAFETY ADULT  Goal: Patient will remain free of falls  Description: INTERVENTIONS:  - Educate patient/family on patient safety including physical limitations  - Instruct patient to call for assistance with activity   -  Consider consulting OT/PT to assist with strengthening/mobility based on AM PAC & JH-HLM score  - Consult OT/PT to assist with strengthening/mobility   - Keep Call bell within reach  - Keep bed low and locked with side rails adjusted as appropriate  - Keep care items and personal belongings within reach  - Initiate and maintain comfort rounds  - Make Fall Risk Sign visible to staff  - Offer Toileting every 2 Hours, in advance of need  - Initiate/Maintain bed alarm  - Obtain necessary fall risk management equipment: yellow socks.  - Apply yellow socks and bracelet for high fall risk patients  - Consider moving patient to room near nurses station  Outcome: Progressing  Goal: Maintain or return to baseline ADL function  Description: INTERVENTIONS:  -  Assess patient's ability to carry out ADLs; assess patient's baseline for ADL function and identify physical deficits which impact ability to perform ADLs (bathing, care of mouth/teeth, toileting, grooming, dressing, etc.)  - Assess/evaluate cause of self-care deficits   - Assess range of motion  - Assess patient's mobility; develop plan if impaired  - Assess patient's need for assistive devices and provide as appropriate  - Encourage maximum independence but intervene and supervise when necessary  - Involve family in performance of ADLs  - Assess for home care needs following discharge   - Consider OT consult to assist with ADL evaluation and planning for discharge  - Provide patient education as appropriate  - Monitor functional capacity and physical performance, use of AM PAC & JH-HLM   - Monitor gait, balance and fatigue with ambulation    Outcome: Progressing  Goal: Maintains/Returns to pre admission functional level  Description: INTERVENTIONS:  - Perform AM-PAC 6 Click Basic Mobility/ Daily Activity assessment daily.  - Set and communicate daily mobility goal to care team and patient/family/caregiver.   - Collaborate with rehabilitation services on mobility goals  if consulted  - Perform Range of Motion 3 times a day.  - Reposition patient every 2 hours.  - Dangle patient 3 times a day  - Stand patient 3 times a day  - Ambulate patient 3 times a day  - Out of bed to chair 3 times a day   - Out of bed for meals 3 times a day  - Out of bed for toileting  - Record patient progress and toleration of activity level   Outcome: Progressing     Problem: DISCHARGE PLANNING  Goal: Discharge to home or other facility with appropriate resources  Description: INTERVENTIONS:  - Identify barriers to discharge w/patient and caregiver  - Arrange for needed discharge resources and transportation as appropriate  - Identify discharge learning needs (meds, wound care, etc.)  - Arrange for interpretive services to assist at discharge as needed  - Refer to Case Management Department for coordinating discharge planning if the patient needs post-hospital services based on physician/advanced practitioner order or complex needs related to functional status, cognitive ability, or social support system  Outcome: Progressing     Problem: Knowledge Deficit  Goal: Patient/family/caregiver demonstrates understanding of disease process, treatment plan, medications, and discharge instructions  Description: Complete learning assessment and assess knowledge base.  Interventions:  - Provide teaching at level of understanding  - Provide teaching via preferred learning methods  Outcome: Progressing     Problem: GASTROINTESTINAL - ADULT  Goal: Minimal or absence of nausea and/or vomiting  Description: INTERVENTIONS:  - Administer IV fluids if ordered to ensure adequate hydration  - Maintain NPO status until nausea and vomiting are resolved  - Nasogastric tube if ordered  - Administer ordered antiemetic medications as needed  - Provide nonpharmacologic comfort measures as appropriate  - Advance diet as tolerated, if ordered  - Consider nutrition services referral to assist patient with adequate nutrition and  appropriate food choices  Outcome: Progressing  Goal: Maintains or returns to baseline bowel function  Description: INTERVENTIONS:  - Assess bowel function  - Encourage oral fluids to ensure adequate hydration  - Administer IV fluids if ordered to ensure adequate hydration  - Administer ordered medications as needed  - Encourage mobilization and activity  - Consider nutritional services referral to assist patient with adequate nutrition and appropriate food choices  Outcome: Progressing  Goal: Maintains adequate nutritional intake  Description: INTERVENTIONS:  - Monitor percentage of each meal consumed  - Identify factors contributing to decreased intake, treat as appropriate  - Assist with meals as needed  - Monitor I&O, weight, and lab values if indicated  - Obtain nutrition services referral as needed  Outcome: Progressing     Problem: METABOLIC, FLUID AND ELECTROLYTES - ADULT  Goal: Electrolytes maintained within normal limits  Description: INTERVENTIONS:  - Monitor labs and assess patient for signs and symptoms of electrolyte imbalances  - Administer electrolyte replacement as ordered  - Monitor response to electrolyte replacements, including repeat lab results as appropriate  - Instruct patient on fluid and nutrition as appropriate  Outcome: Progressing  Goal: Fluid balance maintained  Description: INTERVENTIONS:  - Monitor labs   - Monitor I/O and WT  - Instruct patient on fluid and nutrition as appropriate  - Assess for signs & symptoms of volume excess or deficit  Outcome: Progressing  Goal: Glucose maintained within target range  Description: INTERVENTIONS:  - Monitor Blood Glucose as ordered  - Assess for signs and symptoms of hyperglycemia and hypoglycemia  - Administer ordered medications to maintain glucose within target range  - Assess nutritional intake and initiate nutrition service referral as needed  Outcome: Progressing     Problem: Nutrition/Hydration-ADULT  Goal: Nutrient/Hydration intake  appropriate for improving, restoring or maintaining nutritional needs  Description: Monitor and assess patient's nutrition/hydration status for malnutrition. Collaborate with interdisciplinary team and initiate plan and interventions as ordered.  Monitor patient's weight and dietary intake as ordered or per policy. Utilize nutrition screening tool and intervene as necessary. Determine patient's food preferences and provide high-protein, high-caloric foods as appropriate.     INTERVENTIONS:  - Monitor oral intake, urinary output, labs, and treatment plans  - Assess nutrition and hydration status and recommend course of action  - Evaluate amount of meals eaten  - Assist patient with eating if necessary   - Allow adequate time for meals  - Recommend/ encourage appropriate diets, oral nutritional supplements, and vitamin/mineral supplements  - Order, calculate, and assess calorie counts as needed  - Recommend, monitor, and adjust tube feedings and TPN/PPN based on assessed needs  - Assess need for intravenous fluids  - Provide specific nutrition/hydration education as appropriate  - Include patient/family/caregiver in decisions related to nutrition  Outcome: Progressing     Problem: Prexisting or High Potential for Compromised Skin Integrity  Goal: Skin integrity is maintained or improved  Description: INTERVENTIONS:  - Identify patients at risk for skin breakdown  - Assess and monitor skin integrity including under and around medical devices   - Assess and monitor nutrition and hydration status  - Monitor labs  - Assess for incontinence   - Turn and reposition patient  - Assist with mobility/ambulation  - Relieve pressure over keith prominences   - Avoid friction and shearing  - Provide appropriate hygiene as needed including keeping skin clean and dry  - Evaluate need for skin moisturizer/barrier cream  - Collaborate with interdisciplinary team  - Patient/family teaching  - Consider wound care consult    Assess:  -  Review Jose scale daily  - Clean and moisturize skin every shift  - Inspect skin when repositioning, toileting, and assisting with ADLS  - Assess under medical devices such as masimo every shift  - Assess extremities for adequate circulation and sensation     Bed Management:  - Have minimal linens on bed & keep smooth, unwrinkled  - Change linens as needed when moist or perspiring  - Avoid sitting or lying in one position for more than 2 hours while in bed?Keep HOB at 30 degrees   - Toileting:  - Offer bedside commode  - Assess for incontinence every 2 hrs  - Use incontinent care products after each incontinent episode such as lotion    Activity:  - Mobilize patient 3 times a day  - Encourage activity and walks on unit  - Encourage or provide ROM exercises   - Turn and reposition patient every 2 Hours  - Use appropriate equipment to lift or move patient in bed  - Instruct/ Assist with weight shifting every 1 hr when out of bed in chair  - Consider limitation of chair time 1 hour intervals    Skin Care:  - Avoid use of baby powder, tape, friction and shearing, hot water or constrictive clothing  - Relieve pressure over bony prominences using allevyn  - Do not massage red bony areas    Next Steps:  - Teach patient strategies to minimize risks such as repositioning   - Consider consults to  interdisciplinary teams such as PTOT  Outcome: Progressing     Problem: Potential for Falls  Goal: Patient will remain free of falls  Description: INTERVENTIONS:  - Educate patient/family on patient safety including physical limitations  - Instruct patient to call for assistance with activity   - Consider consulting OT/PT to assist with strengthening/mobility based on AM PAC & JH-HLM score  - Consult OT/PT to assist with strengthening/mobility   - Keep Call bell within reach  - Keep bed low and locked with side rails adjusted as appropriate  - Keep care items and personal belongings within reach  - Initiate and maintain comfort  rounds  - Make Fall Risk Sign visible to staff  - Offer Toileting every 2 Hours, in advance of need  - Initiate/Maintain bed alarm  - Obtain necessary fall risk management equipment: yellow socks.  - Apply yellow socks and bracelet for high fall risk patients  - Consider moving patient to room near nurses station  Outcome: Progressing

## 2025-06-19 NOTE — ASSESSMENT & PLAN NOTE
Lab Results   Component Value Date    EGFR 20 06/19/2025    EGFR 19 06/18/2025    EGFR 20 06/16/2025    CREATININE 3.27 (H) 06/19/2025    CREATININE 3.29 (H) 06/18/2025    CREATININE 3.22 (H) 06/16/2025

## 2025-06-19 NOTE — ASSESSMENT & PLAN NOTE
Night of admission 6/12/2025 developed coffee-ground emesis and melena  6/13/2025 underwent EGD which showed esophagitis, no esophageal or gastric varices.  Rest of the exam normal  The patient is being treated with pantoprazole.  He has had no further bleeding.

## 2025-06-19 NOTE — ASSESSMENT & PLAN NOTE
Last bowel movement prior to presentation 6/7 per patient report. Plan for GoLytely bowel prep.  - Continue bowel regimen with MiraLAX twice daily, senna twice daily

## 2025-06-19 NOTE — ASSESSMENT & PLAN NOTE
Baseline creatinine was around 1.9-2.1 in April to May 2025.  During recent admission to [facility] from May to early June 2025, creatinine was around 2.9-3.0 on discharge.  SCr 3.01 on 6/6/2025.  Admission creatinine 2.43 on 6/12/25.   Renal function worsening since admission.  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   UA is bland. Doubt prerenal azotemia since no improvement with albumin. No hydronephrosis on CT.   Overall, SCr stable at around 3.2 to 3.3 the past 4 days.   SCr stable today at 3.27.   Overall, renal function is stable over the past 3 days.  Continue Albumin 25 gm every 8 hours - likely stop after today.   Continue Octreotide 100 mcg q8H.

## 2025-06-19 NOTE — ASSESSMENT & PLAN NOTE
Lab Results   Component Value Date    EGFR 20 06/19/2025    EGFR 19 06/18/2025    EGFR 20 06/16/2025    CREATININE 3.27 (H) 06/19/2025    CREATININE 3.29 (H) 06/18/2025    CREATININE 3.22 (H) 06/16/2025   Seen by nephrology on his previous admission, last seen on 6/6/2025  Per nephro: Baseline creatinine has been from 1.5-2.0 but on last admission his creatinine plateaued at 2.8-3.0 which may represent his new baseline due to his previous advanced CKD and recent ZENAIDA  He is high risk for dialysis in the near future  Nephrology consult is greatly appreciated.  The patient has been started on octreotide.

## 2025-06-19 NOTE — PROGRESS NOTES
NEPHROLOGY HOSPITAL PROGRESS NOTE   Wes Araujo 55 y.o. male MRN: [id number]  Unit/Bed#: [place] [address] -01 Encounter: [id number]  Reason for Consult: ZENAIDA  Assessment & Plan  CKD (chronic kidney disease) stage 4, GFR 15-29 ml/min ([facility])  Baseline creatinine was around 1.9-2.1 in April to May 2025.  During recent admission to [facility] from May to early June 2025, creatinine was around 2.9-3.0 on discharge.  SCr 3.01 on 6/6/2025.  Admission creatinine 2.43 on 6/12/25.   Renal function worsening since admission.  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   UA is bland. Doubt prerenal azotemia since no improvement with albumin. No hydronephrosis on CT.   Overall, SCr stable at around 3.2 to 3.3 the past 4 days.   SCr stable today at 3.27.   Overall, renal function is stable over the past 3 days.  Continue Albumin 25 gm every 8 hours - likely stop after today.   Continue Octreotide 100 mcg q8H.     Alcoholic hepatitis with ascites  GI following.  S/p paracentesis x 2.   Most recent was 6/18/25 with 5.7 liters drained.   GI looking into transplant evaluation.     Primary hypertension  BP is slightly high but acceptable.   Current Rx: Amlodipine 5 mg BID, Carvedilol 6.25 mg BID.   Hydralazine stopped on 6/16/25  No changes .    Electrolyte abnormality  Metabolic acidosis:  CO2 is up to 23 today.   Continue sodium bicarbonate 1300 mg TID    Anemia  Hemoglobin stable.  Defer to primary service.    Coffee ground emesis  S/p EGD on 6/13/25 with esophagitis.  Currently on Protonix 40 mg BID.       TODAY's DISCUSSION/PLAN:  Renal function is stable today.   Continue albumin through today and plan to stop after today's dose.   Continue Octreotide.     SUBJECTIVE / 24H INTERVAL HISTORY:  Had paracentesis yesterday and had 5.7 liters drained.   Less distended today.   No acute events.     OBJECTIVE:  Current Weight: Weight - Scale: 64.5 kg (142 lb 3.2 oz)  Vitals:    06/18/25 1602 06/18/25 2122 06/19/25 0600 06/19/25 0711  "  BP: 142/69 157/77  153/72   BP Location:    Right arm   Pulse: 68 71 73 78   Resp: 18   17   Temp: 98.7 °F (37.1 °C) 99 °F (37.2 °C)  98.7 °F (37.1 °C)   TempSrc:    Oral   SpO2: 97% 94% 96% 95%   Weight:       Height:           Intake/Output Summary (Last 24 hours) at 6/19/2025 1039  Last data filed at 6/19/2025 0910  Gross per 24 hour   Intake 6940 ml   Output 250 ml   Net 6690 ml     General: conscious, cooperative, no distress  Skin: dry  Eyes: pink conjunctivae  ENT: moist mucous membranes  Respiratory: equal chest expansion, clear breath sounds.  Cardiovascular: distinct heart sounds, normal rate, regular rhythm, no rub  Abdomen: soft, non tender, less distended, normal bowel sounds  Extremities: no edema.   Genitourinary: no ibrahim catheter.   Neuro: awake, alert.     Medications:  Current Medications[1]    Laboratory Results:  Results from last 7 days   Lab Units 06/19/25  0500 06/18/25  0624 06/16/25  0506 06/15/25  0523 06/14/25  0447 06/13/25  0553 06/12/25  1819 06/12/25  1714 06/12/25  1714 06/12/25  1642   WBC Thousand/uL 12.30*  --  12.51* 12.40* 16.16* 11.44*  --   --   --  10.34*   HEMOGLOBIN g/dL 8.1*  --  8.8* 8.5* 6.9* 7.6*  --   --   --  8.3*   HEMATOCRIT % 23.8*  --  25.1* 24.2* 19.7* 21.7*  --   --   --  24.3*   PLATELETS Thousands/uL 193  --  186 178 198 209  --   --   --  245   POTASSIUM mmol/L 5.0 5.3 4.3 4.8 5.1 5.1 4.7   < > 6.7*  --    CHLORIDE mmol/L 104 104 107 107 108 109*  --   --  108  --    CO2 mmol/L 23 19* 20* 20* 19* 18*  --   --  17*  --    BUN mg/dL 42* 43* 39* 38* 37* 33*  --   --  29*  --    CREATININE mg/dL 3.27* 3.29* 3.22* 3.05* 2.81* 2.42*  --   --  2.43*  --    CALCIUM mg/dL 9.1 9.0 9.2 8.7 9.0 9.0  --   --  8.6  --     < > = values in this interval not displayed.     Portions of the record may have been created with voice recognition software. Occasional wrong word or \"sound a like\" substitutions may have occurred due to the inherent limitations of voice recognition " software. Read the chart carefully and recognize, using context, where substitutions have occurred.If you have any questions, please contact the dictating provider.         [1]   Current Facility-Administered Medications:     acetaminophen (TYLENOL) tablet 325 mg, 325 mg, Oral, Q8H PRN, ARIANE Moore, 325 mg at 06/16/25 2308    albumin human (FLEXBUMIN) 25 % injection 25 g, 25 g, Intravenous, Q8H, Kiran Madden MD, Last Rate: 0 mL/hr at 06/18/25 2301, 25 g at 06/19/25 0610    amLODIPine (NORVASC) tablet 5 mg, 5 mg, Oral, BID, Michael Jewell MD, 5 mg at 06/19/25 0848    bisacodyl (DULCOLAX) rectal suppository 10 mg, 10 mg, Rectal, Daily PRN, Jazmín Buchanan MD    carvedilol (COREG) tablet 6.25 mg, 6.25 mg, Oral, BID With Meals, Juan Ramon Manley PA-C, 6.25 mg at 06/19/25 0742    docusate sodium (COLACE) capsule 100 mg, 100 mg, Oral, BID, Michael Jewell MD, 100 mg at 06/18/25 0914    folic acid (FOLVITE) tablet 1 mg, 1 mg, Oral, Daily, Michael Jewell MD, 1 mg at 06/19/25 0848    HYDROmorphone HCl (DILAUDID) injection 0.2 mg, 0.2 mg, Intravenous, Q3H PRN, Josh Cain MD    insulin aspart protamine-insulin aspart (NovoLOG 70/30) 100 units/mL subcutaneous injection 10 Units, 10 Units, Subcutaneous, BID AC, Josh Cain MD    insulin lispro (HumALOG/ADMELOG) 100 units/mL subcutaneous injection 1-5 Units, 1-5 Units, Subcutaneous, TID AC, 3 Units at 06/19/25 0743 **AND** Fingerstick Glucose (POCT), , , TID AC, Juan Ramon Manley PA-C    insulin lispro (HumALOG/ADMELOG) 100 units/mL subcutaneous injection 1-5 Units, 1-5 Units, Subcutaneous, HS, Juan Ramon Manley PA-C, 1 Units at 06/18/25 2206    octreotide (SandoSTATIN) injection 100 mcg, 100 mcg, Subcutaneous, Q8H JAISON, Kiran Madden MD, 100 mcg at 06/19/25 0610    pancrelipase (Lip-Prot-Amyl) (CREON) delayed release capsule 24,000 Units, 24,000 Units, Oral, TID With Meals, Michael Jewell MD, 24,000  Units at 06/19/25 0743    pantoprazole (PROTONIX) EC tablet 40 mg, 40 mg, Oral, BID AC, Naa Quiles MD, 40 mg at 06/19/25 0610    senna (SENOKOT) tablet 17.2 mg, 2 tablet, Oral, BID, Vishnu Larsen MD    sodium bicarbonate tablet 1,300 mg, 1,300 mg, Oral, TID after meals, Juan Ramon Manley PA-C, 1,300 mg at 06/19/25 0848    thiamine tablet 100 mg, 100 mg, Oral, Daily, Michael Jewell MD, 100 mg at 06/19/25 0848    trimethobenzamide (TIGAN) IM injection 200 mg, 200 mg, Intramuscular, Q6H PRN, Juan Ramon Manley PA-C, 200 mg at 06/13/25 0741

## 2025-06-19 NOTE — PLAN OF CARE
Problem: PAIN - ADULT  Goal: Verbalizes/displays adequate comfort level or baseline comfort level  Description: Interventions:  - Encourage patient to monitor pain and request assistance  - Assess pain using appropriate pain scale  - Administer analgesics as ordered based on type and severity of pain and evaluate response  - Implement non-pharmacological measures as appropriate and evaluate response  - Consider cultural and social influences on pain and pain management  - Notify physician/advanced practitioner if interventions unsuccessful or patient reports new pain  - Educate patient/family on pain management process including their role and importance of  reporting pain   - Provide non-pharmacologic/complimentary pain relief interventions  Outcome: Progressing     Problem: INFECTION - ADULT  Goal: Absence or prevention of progression during hospitalization  Description: INTERVENTIONS:  - Assess and monitor for signs and symptoms of infection  - Monitor lab/diagnostic results  - Monitor all insertion sites, i.e. indwelling lines, tubes, and drains  - Monitor endotracheal if appropriate and nasal secretions for changes in amount and color  - Sharon appropriate cooling/warming therapies per order  - Administer medications as ordered  - Instruct and encourage patient and family to use good hand hygiene technique  - Identify and instruct in appropriate isolation precautions for identified infection/condition  Outcome: Progressing  Goal: Absence of fever/infection during neutropenic period  Description: INTERVENTIONS:  - Monitor WBC  - Perform strict hand hygiene  - Limit to healthy visitors only  - No plants, dried, fresh or silk flowers with mcclure in patient room  Outcome: Progressing     Problem: SAFETY ADULT  Goal: Patient will remain free of falls  Description: INTERVENTIONS:  - Educate patient/family on patient safety including physical limitations  - Instruct patient to call for assistance with activity   -  Consider consulting OT/PT to assist with strengthening/mobility based on AM PAC & JH-HLM score  - Consult OT/PT to assist with strengthening/mobility   - Keep Call bell within reach  - Keep bed low and locked with side rails adjusted as appropriate  - Keep care items and personal belongings within reach  - Initiate and maintain comfort rounds  - Make Fall Risk Sign visible to staff  - Offer Toileting every 2 Hours, in advance of need  - Initiate/Maintain bed alarm  - Obtain necessary fall risk management equipment: yellow socks.  - Apply yellow socks and bracelet for high fall risk patients  - Consider moving patient to room near nurses station  Outcome: Progressing  Goal: Maintain or return to baseline ADL function  Description: INTERVENTIONS:  -  Assess patient's ability to carry out ADLs; assess patient's baseline for ADL function and identify physical deficits which impact ability to perform ADLs (bathing, care of mouth/teeth, toileting, grooming, dressing, etc.)  - Assess/evaluate cause of self-care deficits   - Assess range of motion  - Assess patient's mobility; develop plan if impaired  - Assess patient's need for assistive devices and provide as appropriate  - Encourage maximum independence but intervene and supervise when necessary  - Involve family in performance of ADLs  - Assess for home care needs following discharge   - Consider OT consult to assist with ADL evaluation and planning for discharge  - Provide patient education as appropriate  - Monitor functional capacity and physical performance, use of AM PAC & JH-HLM   - Monitor gait, balance and fatigue with ambulation    Outcome: Progressing  Goal: Maintains/Returns to pre admission functional level  Description: INTERVENTIONS:  - Perform AM-PAC 6 Click Basic Mobility/ Daily Activity assessment daily.  - Set and communicate daily mobility goal to care team and patient/family/caregiver.   - Collaborate with rehabilitation services on mobility goals  if consulted  - Perform Range of Motion 3 times a day.  - Reposition patient every 2 hours.  - Dangle patient 3 times a day  - Stand patient 3 times a day  - Ambulate patient 3 times a day  - Out of bed to chair 3 times a day   - Out of bed for meals 3 times a day  - Out of bed for toileting  - Record patient progress and toleration of activity level   Outcome: Progressing     Problem: DISCHARGE PLANNING  Goal: Discharge to home or other facility with appropriate resources  Description: INTERVENTIONS:  - Identify barriers to discharge w/patient and caregiver  - Arrange for needed discharge resources and transportation as appropriate  - Identify discharge learning needs (meds, wound care, etc.)  - Arrange for interpretive services to assist at discharge as needed  - Refer to Case Management Department for coordinating discharge planning if the patient needs post-hospital services based on physician/advanced practitioner order or complex needs related to functional status, cognitive ability, or social support system  Outcome: Progressing     Problem: Knowledge Deficit  Goal: Patient/family/caregiver demonstrates understanding of disease process, treatment plan, medications, and discharge instructions  Description: Complete learning assessment and assess knowledge base.  Interventions:  - Provide teaching at level of understanding  - Provide teaching via preferred learning methods  Outcome: Progressing     Problem: GASTROINTESTINAL - ADULT  Goal: Minimal or absence of nausea and/or vomiting  Description: INTERVENTIONS:  - Administer IV fluids if ordered to ensure adequate hydration  - Maintain NPO status until nausea and vomiting are resolved  - Nasogastric tube if ordered  - Administer ordered antiemetic medications as needed  - Provide nonpharmacologic comfort measures as appropriate  - Advance diet as tolerated, if ordered  - Consider nutrition services referral to assist patient with adequate nutrition and  appropriate food choices  Outcome: Progressing  Goal: Maintains or returns to baseline bowel function  Description: INTERVENTIONS:  - Assess bowel function  - Encourage oral fluids to ensure adequate hydration  - Administer IV fluids if ordered to ensure adequate hydration  - Administer ordered medications as needed  - Encourage mobilization and activity  - Consider nutritional services referral to assist patient with adequate nutrition and appropriate food choices  Outcome: Progressing  Goal: Maintains adequate nutritional intake  Description: INTERVENTIONS:  - Monitor percentage of each meal consumed  - Identify factors contributing to decreased intake, treat as appropriate  - Assist with meals as needed  - Monitor I&O, weight, and lab values if indicated  - Obtain nutrition services referral as needed  Outcome: Progressing     Problem: METABOLIC, FLUID AND ELECTROLYTES - ADULT  Goal: Electrolytes maintained within normal limits  Description: INTERVENTIONS:  - Monitor labs and assess patient for signs and symptoms of electrolyte imbalances  - Administer electrolyte replacement as ordered  - Monitor response to electrolyte replacements, including repeat lab results as appropriate  - Instruct patient on fluid and nutrition as appropriate  Outcome: Progressing  Goal: Fluid balance maintained  Description: INTERVENTIONS:  - Monitor labs   - Monitor I/O and WT  - Instruct patient on fluid and nutrition as appropriate  - Assess for signs & symptoms of volume excess or deficit  Outcome: Progressing  Goal: Glucose maintained within target range  Description: INTERVENTIONS:  - Monitor Blood Glucose as ordered  - Assess for signs and symptoms of hyperglycemia and hypoglycemia  - Administer ordered medications to maintain glucose within target range  - Assess nutritional intake and initiate nutrition service referral as needed  Outcome: Progressing     Problem: Nutrition/Hydration-ADULT  Goal: Nutrient/Hydration intake  appropriate for improving, restoring or maintaining nutritional needs  Description: Monitor and assess patient's nutrition/hydration status for malnutrition. Collaborate with interdisciplinary team and initiate plan and interventions as ordered.  Monitor patient's weight and dietary intake as ordered or per policy. Utilize nutrition screening tool and intervene as necessary. Determine patient's food preferences and provide high-protein, high-caloric foods as appropriate.     INTERVENTIONS:  - Monitor oral intake, urinary output, labs, and treatment plans  - Assess nutrition and hydration status and recommend course of action  - Evaluate amount of meals eaten  - Assist patient with eating if necessary   - Allow adequate time for meals  - Recommend/ encourage appropriate diets, oral nutritional supplements, and vitamin/mineral supplements  - Order, calculate, and assess calorie counts as needed  - Recommend, monitor, and adjust tube feedings and TPN/PPN based on assessed needs  - Assess need for intravenous fluids  - Provide specific nutrition/hydration education as appropriate  - Include patient/family/caregiver in decisions related to nutrition  Outcome: Progressing     Problem: Prexisting or High Potential for Compromised Skin Integrity  Goal: Skin integrity is maintained or improved  Description: INTERVENTIONS:  - Identify patients at risk for skin breakdown  - Assess and monitor skin integrity including under and around medical devices   - Assess and monitor nutrition and hydration status  - Monitor labs  - Assess for incontinence   - Turn and reposition patient  - Assist with mobility/ambulation  - Relieve pressure over keith prominences   - Avoid friction and shearing  - Provide appropriate hygiene as needed including keeping skin clean and dry  - Evaluate need for skin moisturizer/barrier cream  - Collaborate with interdisciplinary team  - Patient/family teaching  - Consider wound care consult    Assess:  -  Review Jose scale daily  - Clean and moisturize skin every shift  - Inspect skin when repositioning, toileting, and assisting with ADLS  - Assess under medical devices such as masimo every shift  - Assess extremities for adequate circulation and sensation     Bed Management:  - Have minimal linens on bed & keep smooth, unwrinkled  - Change linens as needed when moist or perspiring  - Avoid sitting or lying in one position for more than 2 hours while in bed?Keep HOB at 30 degrees   - Toileting:  - Offer bedside commode  - Assess for incontinence every 2 hrs  - Use incontinent care products after each incontinent episode such as lotion    Activity:  - Mobilize patient 3 times a day  - Encourage activity and walks on unit  - Encourage or provide ROM exercises   - Turn and reposition patient every 2 Hours  - Use appropriate equipment to lift or move patient in bed  - Instruct/ Assist with weight shifting every 1 hr when out of bed in chair  - Consider limitation of chair time 1 hour intervals    Skin Care:  - Avoid use of baby powder, tape, friction and shearing, hot water or constrictive clothing  - Relieve pressure over bony prominences using allevyn  - Do not massage red bony areas    Next Steps:  - Teach patient strategies to minimize risks such as repositioning   - Consider consults to  interdisciplinary teams such as PTOT  Outcome: Progressing     Problem: Potential for Falls  Goal: Patient will remain free of falls  Description: INTERVENTIONS:  - Educate patient/family on patient safety including physical limitations  - Instruct patient to call for assistance with activity   - Consider consulting OT/PT to assist with strengthening/mobility based on AM PAC & JH-HLM score  - Consult OT/PT to assist with strengthening/mobility   - Keep Call bell within reach  - Keep bed low and locked with side rails adjusted as appropriate  - Keep care items and personal belongings within reach  - Initiate and maintain comfort  rounds  - Make Fall Risk Sign visible to staff  - Offer Toileting every 2 Hours, in advance of need  - Initiate/Maintain bed alarm  - Obtain necessary fall risk management equipment: yellow socks.  - Apply yellow socks and bracelet for high fall risk patients  - Consider moving patient to room near nurses station  Outcome: Progressing

## 2025-06-19 NOTE — ASSESSMENT & PLAN NOTE
55-year-old male with a past medical history of hypertension, diabetes mellitus type 2, chronic pancreatitis secondary to chronic alcohol use and recent admission for alc hep with likely ZENAIDA on CKD (creatinine 3.01 at discharge during previous hospitalization, previously 0.8-1 in 2024 but more recently 1.7-2) who presented to [facility] on 6/12 for concern of recurrent abdominal distension with associated pain in the setting of constipation.   Labs on presentation significant for continued improvement in AST/ALT/ALP since last admission, however, bilirubin continues to increase.  INR 1.18 on presentation (peaked last admission at 5.53 with dramatic improvement after Vitamin K). Kidney function appeared to be improving from previous admission at which time creatinine peaked at 3.01, but is now worsening rising to 3.29 today.    Patient previously underwent serologic liver workup negative for alternative cause of liver injury with elevations thought to be 2/2 alc hep in setting of some degree of underlying fibrosis.  Patient was treated with conservative management without steroids due to low Maddrey's score.  Patient was recommended to follow-up with gastroenterology but now presenting with recurrent abdominal distention/pain along with coffee-ground emesis and melena on admission leading to EGD without evidence of varices, but showing grade D esophagitis.  Hospitalization further complicated by continued abdominal pain likely in the setting of constipation as well as worsening ZENAIDA with concern for HRS now on octreotide and albumin.    Maddrey's Discriminant Function - Control Prothrombin 13: 28.78 at 6/18/2025  6:24 AM  Calculated from:  Total Bilirubin: 13.6 mg/dL at 6/18/2025  6:24 AM  Prothrombin Time: 16.3 seconds at 6/18/2025  6:24 AM  MDF = (4.6 * (PT - Control PT)) + Bilirubin     MELD 3.0: 32 at 6/19/2025  5:00 AM  MELD-Na: 31 at 6/19/2025  5:00 AM  Calculated from:  Serum Creatinine: 3.27 mg/dL (Using max of 3  mg/dL) at 6/19/2025  5:00 AM  Serum Sodium: 138 mmol/L (Using max of 137 mmol/L) at 6/19/2025  5:00 AM  Total Bilirubin: 13.6 mg/dL at 6/18/2025  6:24 AM  Serum Albumin: 3.7 g/dL (Using max of 3.5 g/dL) at 6/18/2025  6:24 AM  INR(ratio): 1.29 at 6/18/2025  6:24 AM  Age at listing (hypothetical): 55 years  Sex: Male at 6/19/2025  5:00 AM    # Elevated Liver Chemistries - Alcohol hepatitis:   Mild downtrend in LFTs today: AST/ALT 35/26, , and total bilirubin 12.88  Monitor and trend LFTs daily along with INR; no indication for prednisolone  Avoid hepatotoxic medications, strongly encourage complete alcohol cessation  Consider liver biopsy outpatient given significant alkaline phosphatase elevation prior to admission  Recommend complete alcohol cessation moving forward.  Will check Peth this patient states he has not consumed alcohol since before hospitalization last month when he presented on 5/23  Additional pain and symptom management per primary team    # Ascites with ZENAIDA/CKD:   Status post IR paracentesis on 6/13/2025 with removal of 1670 cc, negative for SBP  Studies not consistent with SBP however patient with increasing serum creatinine  Repeat paracentesis on 6/18 with removal of 5700 cc without evidence of SBP  Continue 25% albumin 25 g x3 doses due to worsening creatinine; diuretics currently on hold.  No need for midodrine as MAPs adequate  Nephrology following  Monitor and trend serum creatinine and electrolytes daily, monitor urine output    # Coffee Ground Emesis and Melena:  Status post EGD 6/13/2025 with evidence of grade D esophagitis; history of gastric dieulafoy lesion as well 2/2025  Continue on Protonix 40 mg twice daily by mouth  Status post ceftriaxone x 5 days for SBP prophylaxis in setting of GI bleed and severe alcohol hepatitis  Monitor and trend hemoglobin, transfuse for goal greater than 7  Alert GI team of any concern for recurrent GI bleeding    # Transplant  Patient with history  of alcoholic hepatitis and now concern for HRS which may potentially need for transplant.  He has recent alcohol use within the past 3 weeks  Follow up Peth from 6/18  Discussed concern with family at bedside including parents and brother.  He has a good support system from all family members including parents who he lives with.  They have encouraged him recently to stop alcohol consumption and he is now adamant that he will not drink again  No previous DUIs or legal trouble associated with drinking.  No previous stays in alcohol rehabilitation

## 2025-06-19 NOTE — ASSESSMENT & PLAN NOTE
Patient is a 55-year-old male with a past medical history of alcohol abuse, alcoholic liver cirrhosis, anemia, hypertension, type 2 diabetes, history of atrial fibrillation, chronic pancreatitis who presented to the hospital with abdominal pain and distention.  Overnight on the day of admission developed coffee-ground emesis and melena  Patient was admitted from 5/23/2025 to 6/6/2025 secondary to toxic metabolic encephalopathy secondary to hyperglycemia, HHNK, kidney injury and acute liver failure.  Received insulin, IV fluids, seen by gastroenterology, DF did not meet criteria for steroids, infection was ruled out, initial plan was for patient to be discharged to inpatient alcoholic rehab but patient refused and he was discharged home    6/12/2025 CT showed large volume ascites, increased since 6/3/2025   6/13/2025 underwent paracentesis 1670 cc clear fluid removed, no evidence of SBP, fluid cultures are pending  6/13/2025 underwent EGD which showed esophagitis.  No esophageal or gastric varices.  Ceftriaxone for 5 days for SBP prophylaxis in the setting of GI bleed  No indication for prednisolone  Due to worsening of kidney function the patient has been treated with albumin and octreotide.  Followed by GI  The patient underwent paracentesis June 18th for 5700 cc of fluid.

## 2025-06-20 VITALS
TEMPERATURE: 99.4 F | BODY MASS INDEX: 21.06 KG/M2 | HEIGHT: 69 IN | SYSTOLIC BLOOD PRESSURE: 144 MMHG | HEART RATE: 74 BPM | WEIGHT: 142.2 LBS | OXYGEN SATURATION: 95 % | RESPIRATION RATE: 16 BRPM | DIASTOLIC BLOOD PRESSURE: 72 MMHG

## 2025-06-20 LAB
ANION GAP SERPL CALCULATED.3IONS-SCNC: 12 MMOL/L (ref 4–13)
BUN SERPL-MCNC: 42 MG/DL (ref 5–25)
CALCIUM SERPL-MCNC: 9.5 MG/DL (ref 8.4–10.2)
CHLORIDE SERPL-SCNC: 104 MMOL/L (ref 96–108)
CO2 SERPL-SCNC: 23 MMOL/L (ref 21–32)
CREAT SERPL-MCNC: 3.47 MG/DL (ref 0.6–1.3)
GFR SERPL CREATININE-BSD FRML MDRD: 18 ML/MIN/1.73SQ M
GLUCOSE SERPL-MCNC: 142 MG/DL (ref 65–140)
GLUCOSE SERPL-MCNC: 156 MG/DL (ref 65–140)
GLUCOSE SERPL-MCNC: 159 MG/DL (ref 65–140)
POTASSIUM SERPL-SCNC: 5.1 MMOL/L (ref 3.5–5.3)
SODIUM SERPL-SCNC: 139 MMOL/L (ref 135–147)

## 2025-06-20 PROCEDURE — 80048 BASIC METABOLIC PNL TOTAL CA: CPT | Performed by: INTERNAL MEDICINE

## 2025-06-20 PROCEDURE — 99232 SBSQ HOSP IP/OBS MODERATE 35: CPT | Performed by: INTERNAL MEDICINE

## 2025-06-20 PROCEDURE — 99239 HOSP IP/OBS DSCHRG MGMT >30: CPT | Performed by: INTERNAL MEDICINE

## 2025-06-20 PROCEDURE — NC001 PR NO CHARGE: Performed by: INTERNAL MEDICINE

## 2025-06-20 PROCEDURE — 82948 REAGENT STRIP/BLOOD GLUCOSE: CPT

## 2025-06-20 RX ADMIN — PANCRELIPASE 24000 UNITS: 120000; 24000; 76000 CAPSULE, DELAYED RELEASE PELLETS ORAL at 13:17

## 2025-06-20 RX ADMIN — PANCRELIPASE 24000 UNITS: 120000; 24000; 76000 CAPSULE, DELAYED RELEASE PELLETS ORAL at 08:19

## 2025-06-20 RX ADMIN — LACTULOSE 20 G: 20 SOLUTION ORAL at 08:19

## 2025-06-20 RX ADMIN — OCTREOTIDE ACETATE 100 MCG: 100 INJECTION, SOLUTION INTRAVENOUS; SUBCUTANEOUS at 14:38

## 2025-06-20 RX ADMIN — SODIUM BICARBONATE 650 MG TABLET 1300 MG: at 08:18

## 2025-06-20 RX ADMIN — SODIUM BICARBONATE 650 MG TABLET 1300 MG: at 13:17

## 2025-06-20 RX ADMIN — INSULIN ASPART 12 UNITS: 100 INJECTION, SUSPENSION SUBCUTANEOUS at 08:19

## 2025-06-20 RX ADMIN — PANTOPRAZOLE SODIUM 40 MG: 40 TABLET, DELAYED RELEASE ORAL at 06:09

## 2025-06-20 RX ADMIN — CARVEDILOL 6.25 MG: 6.25 TABLET, FILM COATED ORAL at 08:18

## 2025-06-20 RX ADMIN — AMLODIPINE BESYLATE 5 MG: 5 TABLET ORAL at 08:18

## 2025-06-20 RX ADMIN — INSULIN LISPRO 2 UNITS: 100 INJECTION, SOLUTION INTRAVENOUS; SUBCUTANEOUS at 06:05

## 2025-06-20 RX ADMIN — OCTREOTIDE ACETATE 100 MCG: 100 INJECTION, SOLUTION INTRAVENOUS; SUBCUTANEOUS at 06:01

## 2025-06-20 RX ADMIN — ALBUMIN (HUMAN) 25 G: 0.25 INJECTION, SOLUTION INTRAVENOUS at 05:45

## 2025-06-20 NOTE — NURSING NOTE
Patient signed AMA form despite recommendations and education from both GI and Primary team. Patient left in the care of parent in no distress and no c/o pain. All belongings left with the patient.

## 2025-06-20 NOTE — ASSESSMENT & PLAN NOTE
Lab Results   Component Value Date    EGFR 18 06/20/2025    EGFR 20 06/19/2025    EGFR 19 06/18/2025    CREATININE 3.47 (H) 06/20/2025    CREATININE 3.27 (H) 06/19/2025    CREATININE 3.29 (H) 06/18/2025   Seen by nephrology on his previous admission, last seen on 6/6/2025  Per nephro: Baseline creatinine has been from 1.5-2.0 but on last admission his creatinine plateaued at 2.8-3.0 which may represent his new baseline due to his previous advanced CKD and recent ZENAIDA  He is high risk for dialysis in the near future  Nephrology consult is greatly appreciated.  The patient has been started on octreotide.

## 2025-06-20 NOTE — ASSESSMENT & PLAN NOTE
Lab Results   Component Value Date    HGBA1C 9.6 (H) 05/26/2025       Recent Labs     06/19/25  1100 06/19/25  1610 06/19/25  2105 06/20/25  0549   POCGLU 421* 300* 123 159*       HHNK on his previous admission due to noncompliance and alcohol abuse  Home regimen is 70/30, 8 units twice daily, which was held due to coffee-ground emesis  Glucose has improved with adjustment of the patient's insulin yesterday.

## 2025-06-20 NOTE — ASSESSMENT & PLAN NOTE
Pt reported no bowel movement for 6 days  Bowel regimen initiated  Lactulose has been added.  The patient did have a bowel movement today.

## 2025-06-20 NOTE — ASSESSMENT & PLAN NOTE
55-year-old male with a past medical history of hypertension, diabetes mellitus type 2, chronic pancreatitis secondary to chronic alcohol use and recent admission for alc hep with likely ZENAIDA on CKD (creatinine 3.01 at discharge during previous hospitalization, previously 0.8-1 in 2024 but more recently 1.7-2) who presented to [facility] on 6/12 for concern of recurrent abdominal distension with associated pain in the setting of constipation.   Labs on presentation significant for continued improvement in AST/ALT/ALP since last admission, however, bilirubin continues to increase.  INR 1.18 on presentation (peaked last admission at 5.53 with dramatic improvement after Vitamin K). Kidney function appeared to be improving from previous admission at which time creatinine peaked at 3.01, but is now worsening rising to 3.47 today.    Patient previously underwent serologic liver workup negative for alternative cause of liver injury with elevations thought to be 2/2 alc hep in setting of some degree of underlying fibrosis.  Patient was treated with conservative management without steroids due to low Maddrey's score.  Patient was recommended to follow-up with gastroenterology but now presenting with recurrent abdominal distention/pain along with coffee-ground emesis and melena on admission leading to EGD without evidence of varices, but showing grade D esophagitis.  Hospitalization further complicated by continued abdominal pain likely in the setting of constipation as well as worsening ZENAIDA with concern for HRS now on octreotide and albumin.    Maddrey's Discriminant Function - Control Prothrombin 13: 28.78 at 6/18/2025  6:24 AM  Calculated from:  Total Bilirubin: 13.6 mg/dL at 6/18/2025  6:24 AM  Prothrombin Time: 16.3 seconds at 6/18/2025  6:24 AM  MDF = (4.6 * (PT - Control PT)) + Bilirubin     MELD 3.0: 32 at 6/20/2025  5:53 AM  MELD-Na: 31 at 6/20/2025  5:53 AM  Calculated from:  Serum Creatinine: 3.47 mg/dL (Using max of 3  mg/dL) at 6/20/2025  5:53 AM  Serum Sodium: 139 mmol/L (Using max of 137 mmol/L) at 6/20/2025  5:53 AM  Total Bilirubin: 13.6 mg/dL at 6/18/2025  6:24 AM  Serum Albumin: 3.7 g/dL (Using max of 3.5 g/dL) at 6/18/2025  6:24 AM  INR(ratio): 1.29 at 6/18/2025  6:24 AM  Age at listing (hypothetical): 55 years  Sex: Male at 6/20/2025  5:53 AM    # Elevated Liver Chemistries - Alcohol hepatitis:   Monitor and trend LFTs daily along with INR; no indication for prednisolone  Avoid hepatotoxic medications, strongly encourage complete alcohol cessation  Consider liver biopsy outpatient given significant alkaline phosphatase elevation prior to admission  Recommend complete alcohol cessation moving forward.  Will check Peth this patient states he has not consumed alcohol since before hospitalization last month when he presented on 5/23  Additional pain and symptom management per primary team    # Ascites with ZENAIDA/CKD:   Status post IR paracentesis on 6/13/2025 with removal of 1670 cc, negative for SBP  Repeat paracentesis on 6/18 with removal of 5700 cc without evidence of SBP  Continue 25% albumin 25 g x3 doses due to worsening creatinine; diuretics currently on hold.  No need for midodrine as MAPs adequate  Nephrology following  Monitor and trend serum creatinine and electrolytes daily, monitor urine output    # Coffee Ground Emesis and Melena:  Status post EGD 6/13/2025 with evidence of grade D esophagitis; history of gastric dieulafoy lesion as well 2/2025  Continue on Protonix 40 mg twice daily by mouth  Status post ceftriaxone x 5 days for SBP prophylaxis in setting of GI bleed and severe alcohol hepatitis  Monitor and trend hemoglobin, transfuse for goal greater than 7  Alert GI team of any concern for recurrent GI bleeding    # Transplant  Patient with history of alcoholic hepatitis and now concern for HRS which may potentiate need for transplant.  He has recent alcohol use within the past 3 weeks  Follow up Peth from  6/18  Discussed concern with family at bedside including parents and brother.  He has a good support system from all family members including parents who he lives with.  They have encouraged him recently to stop alcohol consumption and he is now adamant that he will not drink again  No previous DUIs or legal trouble associated with drinking.  No previous stays in alcohol rehabilitation  He has multiple hospitalizations each year in the setting of alcohol use with encephalopathy, hypoglycemia as well as more recently alcoholic hepatitis.  More unfortunately, he has poor insight into how severe his condition is and frequently becomes hyperfocused on certain unrelated matters.  During multiple conversations today with , he solely focused on the need to shower stating he needed to be discharged for 2 days to go home to do this.  Tried to reason with him by stating we can have him shower here as discharge obviously was not feasible in his current condition, but he was undeterred.  Reviewed case with Dr. Yola Paige, hepatology, who stated although HRS less likely with normal sodium, elevated urine sodium, it would be reasonable to reach out to [facility] to discuss for potential transfer for transplant evaluation, but only if patient understanding of severity of condition, with complete buy in from pt as well as family support.  Unfortunately during continued discussions, which were unfruitful, patient left AMA.

## 2025-06-20 NOTE — ASSESSMENT & PLAN NOTE
55-year-old male with a past medical history of hypertension, diabetes mellitus type 2, chronic pancreatitis secondary to chronic alcohol use and recent admission for alc hep with likely ZENAIDA on CKD (creatinine 3.01 at discharge during previous hospitalization, previously 0.8-1 in 2024 but more recently 1.7-2) who presented to [facility] on 6/12 for concern of recurrent abdominal distension with associated pain in the setting of constipation.   Labs on presentation significant for continued improvement in AST/ALT/ALP since last admission, however, bilirubin continues to increase.  INR 1.18 on presentation (peaked last admission at 5.53 with dramatic improvement after Vitamin K). Kidney function appeared to be improving from previous admission at which time creatinine peaked at 3.01, but is now worsening rising to 3.47 today.    Patient previously underwent serologic liver workup negative for alternative cause of liver injury with elevations thought to be 2/2 alc hep in setting of some degree of underlying fibrosis.  Patient was treated with conservative management without steroids due to low Maddrey's score.  Patient was recommended to follow-up with gastroenterology but now presenting with recurrent abdominal distention/pain along with coffee-ground emesis and melena on admission leading to EGD without evidence of varices, but showing grade D esophagitis.  Hospitalization further complicated by continued abdominal pain likely in the setting of constipation as well as worsening ZENAIDA with concern for HRS now on octreotide and albumin.    Maddrey's Discriminant Function - Control Prothrombin 13: 28.78 at 6/18/2025  6:24 AM  Calculated from:  Total Bilirubin: 13.6 mg/dL at 6/18/2025  6:24 AM  Prothrombin Time: 16.3 seconds at 6/18/2025  6:24 AM  MDF = (4.6 * (PT - Control PT)) + Bilirubin     MELD 3.0: 32 at 6/20/2025  5:53 AM  MELD-Na: 31 at 6/20/2025  5:53 AM  Calculated from:  Serum Creatinine: 3.47 mg/dL (Using max of 3  mg/dL) at 6/20/2025  5:53 AM  Serum Sodium: 139 mmol/L (Using max of 137 mmol/L) at 6/20/2025  5:53 AM  Total Bilirubin: 13.6 mg/dL at 6/18/2025  6:24 AM  Serum Albumin: 3.7 g/dL (Using max of 3.5 g/dL) at 6/18/2025  6:24 AM  INR(ratio): 1.29 at 6/18/2025  6:24 AM  Age at listing (hypothetical): 55 years  Sex: Male at 6/20/2025  5:53 AM    # Elevated Liver Chemistries - Alcohol hepatitis:   Monitor and trend LFTs daily along with INR; no indication for prednisolone  Avoid hepatotoxic medications, strongly encourage complete alcohol cessation  Consider liver biopsy outpatient given significant alkaline phosphatase elevation prior to admission  Recommend complete alcohol cessation moving forward.  Will check Peth this patient states he has not consumed alcohol since before hospitalization last month when he presented on 5/23  Additional pain and symptom management per primary team    # Ascites with ZENAIDA/CKD:   Status post IR paracentesis on 6/13/2025 with removal of 1670 cc, negative for SBP  Repeat paracentesis on 6/18 with removal of 5700 cc without evidence of SBP  Continue 25% albumin 25 g x3 doses due to worsening creatinine; diuretics currently on hold.  No need for midodrine as MAPs adequate  Nephrology following  Monitor and trend serum creatinine and electrolytes daily, monitor urine output    # Coffee Ground Emesis and Melena:  Status post EGD 6/13/2025 with evidence of grade D esophagitis; history of gastric dieulafoy lesion as well 2/2025  Continue on Protonix 40 mg twice daily by mouth  Status post ceftriaxone x 5 days for SBP prophylaxis in setting of GI bleed and severe alcohol hepatitis  Monitor and trend hemoglobin, transfuse for goal greater than 7  Alert GI team of any concern for recurrent GI bleeding    # Transplant  Patient with history of alcoholic hepatitis and now concern for HRS which may potentiate need for transplant.  He has recent alcohol use within the past 3 weeks  Follow up Peth from  6/18  Discussed concern with family at bedside including parents and brother.  He has a good support system from all family members including parents who he lives with.  They have encouraged him recently to stop alcohol consumption and he is now adamant that he will not drink again  No previous DUIs or legal trouble associated with drinking.  No previous stays in alcohol rehabilitation  He has multiple hospitalizations each year in the setting of alcohol use with encephalopathy, hypoglycemia as well as more recently alcoholic hepatitis.  More unfortunately, he has poor insight into how severe his condition is and frequently becomes hyperfocused on certain unrelated matters.  During multiple conversations today with , he solely focused on the need to shower stating he needed to be discharged for 2 days to go home to do this.  Tried to reason with him by stating we can have him shower here as discharge obviously was not feasible in his current condition, but he was undeterred.  Reviewed case with Dr. Yola Paige, hepatology, who stated although HRS less likely with normal sodium, elevated urine sodium, it would be reasonable to reach out to [facility] to discuss for potential transfer for transplant evaluation, but only if patient understanding of severity of condition, with complete buy in from pt as well as family support.  Unfortunately during continued discussions, which were unfruitful, patient left AMA.

## 2025-06-20 NOTE — PLAN OF CARE
Problem: PAIN - ADULT  Goal: Verbalizes/displays adequate comfort level or baseline comfort level  Description: Interventions:  - Encourage patient to monitor pain and request assistance  - Assess pain using appropriate pain scale  - Administer analgesics as ordered based on type and severity of pain and evaluate response  - Implement non-pharmacological measures as appropriate and evaluate response  - Consider cultural and social influences on pain and pain management  - Notify physician/advanced practitioner if interventions unsuccessful or patient reports new pain  - Educate patient/family on pain management process including their role and importance of  reporting pain   - Provide non-pharmacologic/complimentary pain relief interventions  Outcome: Progressing     Problem: INFECTION - ADULT  Goal: Absence or prevention of progression during hospitalization  Description: INTERVENTIONS:  - Assess and monitor for signs and symptoms of infection  - Monitor lab/diagnostic results  - Monitor all insertion sites, i.e. indwelling lines, tubes, and drains  - Monitor endotracheal if appropriate and nasal secretions for changes in amount and color  - Creede appropriate cooling/warming therapies per order  - Administer medications as ordered  - Instruct and encourage patient and family to use good hand hygiene technique  - Identify and instruct in appropriate isolation precautions for identified infection/condition  Outcome: Progressing  Goal: Absence of fever/infection during neutropenic period  Description: INTERVENTIONS:  - Monitor WBC  - Perform strict hand hygiene  - Limit to healthy visitors only  - No plants, dried, fresh or silk flowers with mcclure in patient room  Outcome: Progressing     Problem: SAFETY ADULT  Goal: Patient will remain free of falls  Description: INTERVENTIONS:  - Educate patient/family on patient safety including physical limitations  - Instruct patient to call for assistance with activity   -  Consider consulting OT/PT to assist with strengthening/mobility based on AM PAC & JH-HLM score  - Consult OT/PT to assist with strengthening/mobility   - Keep Call bell within reach  - Keep bed low and locked with side rails adjusted as appropriate  - Keep care items and personal belongings within reach  - Initiate and maintain comfort rounds  - Make Fall Risk Sign visible to staff  - Offer Toileting every 2 Hours, in advance of need  - Initiate/Maintain bed alarm  - Obtain necessary fall risk management equipment: yellow socks.  - Apply yellow socks and bracelet for high fall risk patients  - Consider moving patient to room near nurses station  Outcome: Progressing  Goal: Maintain or return to baseline ADL function  Description: INTERVENTIONS:  -  Assess patient's ability to carry out ADLs; assess patient's baseline for ADL function and identify physical deficits which impact ability to perform ADLs (bathing, care of mouth/teeth, toileting, grooming, dressing, etc.)  - Assess/evaluate cause of self-care deficits   - Assess range of motion  - Assess patient's mobility; develop plan if impaired  - Assess patient's need for assistive devices and provide as appropriate  - Encourage maximum independence but intervene and supervise when necessary  - Involve family in performance of ADLs  - Assess for home care needs following discharge   - Consider OT consult to assist with ADL evaluation and planning for discharge  - Provide patient education as appropriate  - Monitor functional capacity and physical performance, use of AM PAC & JH-HLM   - Monitor gait, balance and fatigue with ambulation    Outcome: Progressing  Goal: Maintains/Returns to pre admission functional level  Description: INTERVENTIONS:  - Perform AM-PAC 6 Click Basic Mobility/ Daily Activity assessment daily.  - Set and communicate daily mobility goal to care team and patient/family/caregiver.   - Collaborate with rehabilitation services on mobility goals  if consulted  - Perform Range of Motion 3 times a day.  - Reposition patient every 2 hours.  - Dangle patient 3 times a day  - Stand patient 3 times a day  - Ambulate patient 3 times a day  - Out of bed to chair 3 times a day   - Out of bed for meals 3 times a day  - Out of bed for toileting  - Record patient progress and toleration of activity level   Outcome: Progressing     Problem: DISCHARGE PLANNING  Goal: Discharge to home or other facility with appropriate resources  Description: INTERVENTIONS:  - Identify barriers to discharge w/patient and caregiver  - Arrange for needed discharge resources and transportation as appropriate  - Identify discharge learning needs (meds, wound care, etc.)  - Arrange for interpretive services to assist at discharge as needed  - Refer to Case Management Department for coordinating discharge planning if the patient needs post-hospital services based on physician/advanced practitioner order or complex needs related to functional status, cognitive ability, or social support system  Outcome: Progressing

## 2025-06-20 NOTE — PROGRESS NOTES
Progress Note - Gastroenterology   Name: Wes Araujo 55 y.o. male I MRN: [id number]  Unit/Bed#: [place] [address] -01 I Date of Admission: 6/12/2025   Date of Service: 6/20/2025 I Hospital Day: 8    Assessment & Plan  Alcoholic hepatitis with ascites  Alcohol abuse  Coffee ground emesis  Melena  55-year-old male with a past medical history of hypertension, diabetes mellitus type 2, chronic pancreatitis secondary to chronic alcohol use and recent admission for alc hep with likely ZENAIDA on CKD (creatinine 3.01 at discharge during previous hospitalization, previously 0.8-1 in 2024 but more recently 1.7-2) who presented to [facility] on 6/12 for concern of recurrent abdominal distension with associated pain in the setting of constipation.   Labs on presentation significant for continued improvement in AST/ALT/ALP since last admission, however, bilirubin continues to increase.  INR 1.18 on presentation (peaked last admission at 5.53 with dramatic improvement after Vitamin K). Kidney function appeared to be improving from previous admission at which time creatinine peaked at 3.01, but is now worsening rising to 3.47 today.    Patient previously underwent serologic liver workup negative for alternative cause of liver injury with elevations thought to be 2/2 alc hep in setting of some degree of underlying fibrosis.  Patient was treated with conservative management without steroids due to low Maddrey's score.  Patient was recommended to follow-up with gastroenterology but now presenting with recurrent abdominal distention/pain along with coffee-ground emesis and melena on admission leading to EGD without evidence of varices, but showing grade D esophagitis.  Hospitalization further complicated by continued abdominal pain likely in the setting of constipation as well as worsening ZENAIDA with concern for HRS now on octreotide and albumin.    Maddrey's Discriminant Function - Control Prothrombin 13: 28.78 at 6/18/2025  6:24 AM  Calculated  from:  Total Bilirubin: 13.6 mg/dL at 6/18/2025  6:24 AM  Prothrombin Time: 16.3 seconds at 6/18/2025  6:24 AM  MDF = (4.6 * (PT - Control PT)) + Bilirubin     MELD 3.0: 32 at 6/20/2025  5:53 AM  MELD-Na: 31 at 6/20/2025  5:53 AM  Calculated from:  Serum Creatinine: 3.47 mg/dL (Using max of 3 mg/dL) at 6/20/2025  5:53 AM  Serum Sodium: 139 mmol/L (Using max of 137 mmol/L) at 6/20/2025  5:53 AM  Total Bilirubin: 13.6 mg/dL at 6/18/2025  6:24 AM  Serum Albumin: 3.7 g/dL (Using max of 3.5 g/dL) at 6/18/2025  6:24 AM  INR(ratio): 1.29 at 6/18/2025  6:24 AM  Age at listing (hypothetical): 55 years  Sex: Male at 6/20/2025  5:53 AM    # Elevated Liver Chemistries - Alcohol hepatitis:   Monitor and trend LFTs daily along with INR; no indication for prednisolone  Avoid hepatotoxic medications, strongly encourage complete alcohol cessation  Consider liver biopsy outpatient given significant alkaline phosphatase elevation prior to admission  Recommend complete alcohol cessation moving forward.  Will check Pet this patient states he has not consumed alcohol since before hospitalization last month when he presented on 5/23  Additional pain and symptom management per primary team    # Ascites with ZENAIDA/CKD:   Status post IR paracentesis on 6/13/2025 with removal of 1670 cc, negative for SBP  Repeat paracentesis on 6/18 with removal of 5700 cc without evidence of SBP  Continue 25% albumin 25 g x3 doses due to worsening creatinine; diuretics currently on hold.  No need for midodrine as MAPs adequate  Nephrology following  Monitor and trend serum creatinine and electrolytes daily, monitor urine output    # Coffee Ground Emesis and Melena:  Status post EGD 6/13/2025 with evidence of grade D esophagitis; history of gastric dieulafoy lesion as well 2/2025  Continue on Protonix 40 mg twice daily by mouth  Status post ceftriaxone x 5 days for SBP prophylaxis in setting of GI bleed and severe alcohol hepatitis  Monitor and trend  hemoglobin, transfuse for goal greater than 7  Alert GI team of any concern for recurrent GI bleeding    # Transplant  Patient with history of alcoholic hepatitis and now concern for HRS which may potentiate need for transplant.  He has recent alcohol use within the past 3 weeks  Follow up Peth from 6/18  Discussed concern with family at bedside including parents and brother.  He has a good support system from all family members including parents who he lives with.  They have encouraged him recently to stop alcohol consumption and he is now adamant that he will not drink again  No previous DUIs or legal trouble associated with drinking.  No previous stays in alcohol rehabilitation  He has multiple hospitalizations each year in the setting of alcohol use with encephalopathy, hypoglycemia as well as more recently alcoholic hepatitis.  More unfortunately, he has poor insight into how severe his condition is and frequently becomes hyperfocused on certain unrelated matters.  During multiple conversations today with , he solely focused on the need to shower stating he needed to be discharged for 2 days to go home to do this.  Tried to reason with him by stating we can have him shower here as discharge obviously was not feasible in his current condition, but he was undeterred.  Reviewed case with Dr. Yola Paige, hepatology, who stated although HRS less likely with normal sodium, elevated urine sodium, it would be reasonable to reach out to [facility] to discuss for potential transfer for transplant evaluation, but only if patient understanding of severity of condition, with complete buy in from pt as well as family support.  Unfortunately during continued discussions, which were unfruitful, patient left AMA.  CKD (chronic kidney disease) stage 4, GFR 15-29 ml/min (HCC)  Lab Results   Component Value Date    EGFR 18 06/20/2025    EGFR 20 06/19/2025    EGFR 19 06/18/2025    CREATININE 3.47 (H)  06/20/2025    CREATININE 3.27 (H) 06/19/2025    CREATININE 3.29 (H) 06/18/2025     Electrolyte abnormality    Severe protein-calorie malnutrition (HCC)  Malnutrition Findings:   Adult Malnutrition type: Acute illness  Adult Degree of Malnutrition: Other severe protein calorie malnutrition  Malnutrition Characteristics: Fat loss, Muscle loss, Inadequate energy                  360 Statement: Severe calorie protein malnutrition in context of acute illness r/t inadequte PO intake in setting of abdominal distention/ pain as evidenced by  moderate body fat (triceps, ribcage area) and muscle mass depletions (temporal wasting, protruding clavicles) energy intake less than 50% compared to estimated needs>5 days; Treated with oral supplements    BMI Findings:           Body mass index is 21 kg/m².     Constipation  Last bowel movement prior to presentation 6/7 per patient report. Plan for GoLytely bowel prep.  - Continue bowel regimen with MiraLAX twice daily, senna twice daily    I have discussed the above management plan in detail with the primary service.     Subjective   Patient fixated on going home for 2 days and showering.  Discussed with him multiple times need to remain in the hospital given acuity of condition with worsening creatinine and bilirubin.  He understood acuity of situation and need for possible transfer to transplant capable facility for eval evaluation, but insisted on discharge noted to shower.  He left AMA.  All discussions were conducted in [place] with assistance of [employer] .    Objective :  Temp:  [98.2 °F (36.8 °C)-99.4 °F (37.4 °C)] 99.4 °F (37.4 °C)  HR:  [72-79] 74  BP: (140-161)/(72-82) 144/72  Resp:  [16] 16  SpO2:  [94 %-98 %] 95 %  O2 Device: None (Room air)    Physical Exam  Vitals and nursing note reviewed.   Constitutional:       Appearance: He is well-developed and normal weight.      Comments: jaundice   HENT:      Head: Atraumatic.     Eyes:      General: Scleral  icterus present.     Abdominal:      General: There is distension.      Palpations: Abdomen is soft.      Tenderness: There is no abdominal tenderness. There is no guarding or rebound.     Skin:     General: Skin is warm and dry.      Capillary Refill: Capillary refill takes less than 2 seconds.     Psychiatric:         Mood and Affect: Mood normal.           Lab Results: I have reviewed the following results:none    Imaging Results Review: No pertinent imaging studies reviewed.  Other Study Results Review: No additional pertinent studies reviewed.    Administrative Statements   I have spent a total time of 32 minutes in caring for this patient on the day of the visit/encounter including Diagnostic results, Prognosis, Risks and benefits of tx options, Instructions for management, and Patient and family education.

## 2025-06-20 NOTE — ASSESSMENT & PLAN NOTE
55-year-old male with a past medical history of hypertension, diabetes mellitus type 2, chronic pancreatitis secondary to chronic alcohol use and recent admission for alc hep with likely ZENAIDA on CKD (creatinine 3.01 at discharge during previous hospitalization, previously 0.8-1 in 2024 but more recently 1.7-2) who presented to [facility] on 6/12 for concern of recurrent abdominal distension with associated pain in the setting of constipation.   Labs on presentation significant for continued improvement in AST/ALT/ALP since last admission, however, bilirubin continues to increase.  INR 1.18 on presentation (peaked last admission at 5.53 with dramatic improvement after Vitamin K). Kidney function appeared to be improving from previous admission at which time creatinine peaked at 3.01, but is now worsening rising to 3.47 today.    Patient previously underwent serologic liver workup negative for alternative cause of liver injury with elevations thought to be 2/2 alc hep in setting of some degree of underlying fibrosis.  Patient was treated with conservative management without steroids due to low Maddrey's score.  Patient was recommended to follow-up with gastroenterology but now presenting with recurrent abdominal distention/pain along with coffee-ground emesis and melena on admission leading to EGD without evidence of varices, but showing grade D esophagitis.  Hospitalization further complicated by continued abdominal pain likely in the setting of constipation as well as worsening ZENAIDA with concern for HRS now on octreotide and albumin.    Maddrey's Discriminant Function - Control Prothrombin 13: 28.78 at 6/18/2025  6:24 AM  Calculated from:  Total Bilirubin: 13.6 mg/dL at 6/18/2025  6:24 AM  Prothrombin Time: 16.3 seconds at 6/18/2025  6:24 AM  MDF = (4.6 * (PT - Control PT)) + Bilirubin     MELD 3.0: 32 at 6/20/2025  5:53 AM  MELD-Na: 31 at 6/20/2025  5:53 AM  Calculated from:  Serum Creatinine: 3.47 mg/dL (Using max of 3  mg/dL) at 6/20/2025  5:53 AM  Serum Sodium: 139 mmol/L (Using max of 137 mmol/L) at 6/20/2025  5:53 AM  Total Bilirubin: 13.6 mg/dL at 6/18/2025  6:24 AM  Serum Albumin: 3.7 g/dL (Using max of 3.5 g/dL) at 6/18/2025  6:24 AM  INR(ratio): 1.29 at 6/18/2025  6:24 AM  Age at listing (hypothetical): 55 years  Sex: Male at 6/20/2025  5:53 AM    # Elevated Liver Chemistries - Alcohol hepatitis:   Monitor and trend LFTs daily along with INR; no indication for prednisolone  Avoid hepatotoxic medications, strongly encourage complete alcohol cessation  Consider liver biopsy outpatient given significant alkaline phosphatase elevation prior to admission  Recommend complete alcohol cessation moving forward.  Will check Peth this patient states he has not consumed alcohol since before hospitalization last month when he presented on 5/23  Additional pain and symptom management per primary team    # Ascites with ZENAIDA/CKD:   Status post IR paracentesis on 6/13/2025 with removal of 1670 cc, negative for SBP  Repeat paracentesis on 6/18 with removal of 5700 cc without evidence of SBP  Continue 25% albumin 25 g x3 doses due to worsening creatinine; diuretics currently on hold.  No need for midodrine as MAPs adequate  Nephrology following  Monitor and trend serum creatinine and electrolytes daily, monitor urine output    # Coffee Ground Emesis and Melena:  Status post EGD 6/13/2025 with evidence of grade D esophagitis; history of gastric dieulafoy lesion as well 2/2025  Continue on Protonix 40 mg twice daily by mouth  Status post ceftriaxone x 5 days for SBP prophylaxis in setting of GI bleed and severe alcohol hepatitis  Monitor and trend hemoglobin, transfuse for goal greater than 7  Alert GI team of any concern for recurrent GI bleeding    # Transplant  Patient with history of alcoholic hepatitis and now concern for HRS which may potentiate need for transplant.  He has recent alcohol use within the past 3 weeks  Follow up Peth from  6/18  Discussed concern with family at bedside including parents and brother.  He has a good support system from all family members including parents who he lives with.  They have encouraged him recently to stop alcohol consumption and he is now adamant that he will not drink again  No previous DUIs or legal trouble associated with drinking.  No previous stays in alcohol rehabilitation  He has multiple hospitalizations each year in the setting of alcohol use with encephalopathy, hypoglycemia as well as more recently alcoholic hepatitis.  More unfortunately, he has poor insight into how severe his condition is and frequently becomes hyperfocused on certain unrelated matters.  During multiple conversations today with , he solely focused on the need to shower stating he needed to be discharged for 2 days to go home to do this.  Tried to reason with him by stating we can have him shower here as discharge obviously was not feasible in his current condition, but he was undeterred.  Reviewed case with Dr. Yola Paige, hepatology, who stated although HRS less likely with normal sodium, elevated urine sodium, it would be reasonable to reach out to [facility] to discuss for potential transfer for transplant evaluation, but only if patient understanding of severity of condition, with complete buy in from pt as well as family support.  Unfortunately during continued discussions, which were unfruitful, patient left AMA.

## 2025-06-20 NOTE — PLAN OF CARE
Problem: PAIN - ADULT  Goal: Verbalizes/displays adequate comfort level or baseline comfort level  Description: Interventions:  - Encourage patient to monitor pain and request assistance  - Assess pain using appropriate pain scale  - Administer analgesics as ordered based on type and severity of pain and evaluate response  - Implement non-pharmacological measures as appropriate and evaluate response  - Consider cultural and social influences on pain and pain management  - Notify physician/advanced practitioner if interventions unsuccessful or patient reports new pain  - Educate patient/family on pain management process including their role and importance of  reporting pain   - Provide non-pharmacologic/complimentary pain relief interventions  Outcome: Progressing     Problem: INFECTION - ADULT  Goal: Absence or prevention of progression during hospitalization  Description: INTERVENTIONS:  - Assess and monitor for signs and symptoms of infection  - Monitor lab/diagnostic results  - Monitor all insertion sites, i.e. indwelling lines, tubes, and drains  - Monitor endotracheal if appropriate and nasal secretions for changes in amount and color  - [place] appropriate cooling/warming therapies per order  - Administer medications as ordered  - Instruct and encourage patient and family to use good hand hygiene technique  - Identify and instruct in appropriate isolation precautions for identified infection/condition  Outcome: Progressing  Goal: Absence of fever/infection during neutropenic period  Description: INTERVENTIONS:  - Monitor WBC  - Perform strict hand hygiene  - Limit to healthy visitors only  - No plants, dried, fresh or silk flowers with mcclure in patient room  Outcome: Progressing     Problem: SAFETY ADULT  Goal: Patient will remain free of falls  Description: INTERVENTIONS:  - Educate patient/family on patient safety including physical limitations  - Instruct patient to call for assistance with activity   -  Consider consulting OT/PT to assist with strengthening/mobility based on AM PAC & JH-HLM score  - Consult OT/PT to assist with strengthening/mobility   - Keep Call bell within reach  - Keep bed low and locked with side rails adjusted as appropriate  - Keep care items and personal belongings within reach  - Initiate and maintain comfort rounds  - Make Fall Risk Sign visible to staff  - Offer Toileting every 2 Hours, in advance of need  - Initiate/Maintain bed alarm  - Obtain necessary fall risk management equipment: yellow socks.  - Apply yellow socks and bracelet for high fall risk patients  - Consider moving patient to room near nurses station  Outcome: Progressing  Goal: Maintain or return to baseline ADL function  Description: INTERVENTIONS:  -  Assess patient's ability to carry out ADLs; assess patient's baseline for ADL function and identify physical deficits which impact ability to perform ADLs (bathing, care of mouth/teeth, toileting, grooming, dressing, etc.)  - Assess/evaluate cause of self-care deficits   - Assess range of motion  - Assess patient's mobility; develop plan if impaired  - Assess patient's need for assistive devices and provide as appropriate  - Encourage maximum independence but intervene and supervise when necessary  - Involve family in performance of ADLs  - Assess for home care needs following discharge   - Consider OT consult to assist with ADL evaluation and planning for discharge  - Provide patient education as appropriate  - Monitor functional capacity and physical performance, use of AM PAC & JH-HLM   - Monitor gait, balance and fatigue with ambulation    Outcome: Progressing  Goal: Maintains/Returns to pre admission functional level  Description: INTERVENTIONS:  - Perform AM-PAC 6 Click Basic Mobility/ Daily Activity assessment daily.  - Set and communicate daily mobility goal to care team and patient/family/caregiver.   - Collaborate with rehabilitation services on mobility goals  if consulted  - Perform Range of Motion 3 times a day.  - Reposition patient every 2 hours.  - Dangle patient 3 times a day  - Stand patient 3 times a day  - Ambulate patient 3 times a day  - Out of bed to chair 3 times a day   - Out of bed for meals 3 times a day  - Out of bed for toileting  - Record patient progress and toleration of activity level   Outcome: Progressing     Problem: DISCHARGE PLANNING  Goal: Discharge to home or other facility with appropriate resources  Description: INTERVENTIONS:  - Identify barriers to discharge w/patient and caregiver  - Arrange for needed discharge resources and transportation as appropriate  - Identify discharge learning needs (meds, wound care, etc.)  - Arrange for interpretive services to assist at discharge as needed  - Refer to Case Management Department for coordinating discharge planning if the patient needs post-hospital services based on physician/advanced practitioner order or complex needs related to functional status, cognitive ability, or social support system  Outcome: Progressing     Problem: GASTROINTESTINAL - ADULT  Goal: Minimal or absence of nausea and/or vomiting  Description: INTERVENTIONS:  - Administer IV fluids if ordered to ensure adequate hydration  - Maintain NPO status until nausea and vomiting are resolved  - Nasogastric tube if ordered  - Administer ordered antiemetic medications as needed  - Provide nonpharmacologic comfort measures as appropriate  - Advance diet as tolerated, if ordered  - Consider nutrition services referral to assist patient with adequate nutrition and appropriate food choices  Outcome: Progressing  Goal: Maintains or returns to baseline bowel function  Description: INTERVENTIONS:  - Assess bowel function  - Encourage oral fluids to ensure adequate hydration  - Administer IV fluids if ordered to ensure adequate hydration  - Administer ordered medications as needed  - Encourage mobilization and activity  - Consider  nutritional services referral to assist patient with adequate nutrition and appropriate food choices  Outcome: Progressing  Goal: Maintains adequate nutritional intake  Description: INTERVENTIONS:  - Monitor percentage of each meal consumed  - Identify factors contributing to decreased intake, treat as appropriate  - Assist with meals as needed  - Monitor I&O, weight, and lab values if indicated  - Obtain nutrition services referral as needed  Outcome: Progressing     Problem: METABOLIC, FLUID AND ELECTROLYTES - ADULT  Goal: Electrolytes maintained within normal limits  Description: INTERVENTIONS:  - Monitor labs and assess patient for signs and symptoms of electrolyte imbalances  - Administer electrolyte replacement as ordered  - Monitor response to electrolyte replacements, including repeat lab results as appropriate  - Instruct patient on fluid and nutrition as appropriate  Outcome: Progressing  Goal: Fluid balance maintained  Description: INTERVENTIONS:  - Monitor labs   - Monitor I/O and WT  - Instruct patient on fluid and nutrition as appropriate  - Assess for signs & symptoms of volume excess or deficit  Outcome: Progressing  Goal: Glucose maintained within target range  Description: INTERVENTIONS:  - Monitor Blood Glucose as ordered  - Assess for signs and symptoms of hyperglycemia and hypoglycemia  - Administer ordered medications to maintain glucose within target range  - Assess nutritional intake and initiate nutrition service referral as needed  Outcome: Progressing     Problem: Nutrition/Hydration-ADULT  Goal: Nutrient/Hydration intake appropriate for improving, restoring or maintaining nutritional needs  Description: Monitor and assess patient's nutrition/hydration status for malnutrition. Collaborate with interdisciplinary team and initiate plan and interventions as ordered.  Monitor patient's weight and dietary intake as ordered or per policy. Utilize nutrition screening tool and intervene as  necessary. Determine patient's food preferences and provide high-protein, high-caloric foods as appropriate.     INTERVENTIONS:  - Monitor oral intake, urinary output, labs, and treatment plans  - Assess nutrition and hydration status and recommend course of action  - Evaluate amount of meals eaten  - Assist patient with eating if necessary   - Allow adequate time for meals  - Recommend/ encourage appropriate diets, oral nutritional supplements, and vitamin/mineral supplements  - Order, calculate, and assess calorie counts as needed  - Recommend, monitor, and adjust tube feedings and TPN/PPN based on assessed needs  - Assess need for intravenous fluids  - Provide specific nutrition/hydration education as appropriate  - Include patient/family/caregiver in decisions related to nutrition  Outcome: Progressing     Problem: Prexisting or High Potential for Compromised Skin Integrity  Goal: Skin integrity is maintained or improved  Description: INTERVENTIONS:  - Identify patients at risk for skin breakdown  - Assess and monitor skin integrity including under and around medical devices   - Assess and monitor nutrition and hydration status  - Monitor labs  - Assess for incontinence   - Turn and reposition patient  - Assist with mobility/ambulation  - Relieve pressure over keith prominences   - Avoid friction and shearing  - Provide appropriate hygiene as needed including keeping skin clean and dry  - Evaluate need for skin moisturizer/barrier cream  - Collaborate with interdisciplinary team  - Patient/family teaching  - Consider wound care consult    Assess:  - Review Jose scale daily  - Clean and moisturize skin every shift  - Inspect skin when repositioning, toileting, and assisting with ADLS  - Assess under medical devices such as masimo every shift  - Assess extremities for adequate circulation and sensation     Bed Management:  - Have minimal linens on bed & keep smooth, unwrinkled  - Change linens as needed when  moist or perspiring  - Avoid sitting or lying in one position for more than 2 hours while in bed?Keep HOB at 30 degrees   - Toileting:  - Offer bedside commode  - Assess for incontinence every 2 hrs  - Use incontinent care products after each incontinent episode such as lotion    Activity:  - Mobilize patient 3 times a day  - Encourage activity and walks on unit  - Encourage or provide ROM exercises   - Turn and reposition patient every 2 Hours  - Use appropriate equipment to lift or move patient in bed  - Instruct/ Assist with weight shifting every 1 hr when out of bed in chair  - Consider limitation of chair time 1 hour intervals    Skin Care:  - Avoid use of baby powder, tape, friction and shearing, hot water or constrictive clothing  - Relieve pressure over bony prominences using allevyn  - Do not massage red bony areas    Next Steps:  - Teach patient strategies to minimize risks such as repositioning   - Consider consults to  interdisciplinary teams such as PTOT  Outcome: Progressing

## 2025-06-20 NOTE — PLAN OF CARE
Problem: PAIN - ADULT  Goal: Verbalizes/displays adequate comfort level or baseline comfort level  Description: Interventions:  - Encourage patient to monitor pain and request assistance  - Assess pain using appropriate pain scale  - Administer analgesics as ordered based on type and severity of pain and evaluate response  - Implement non-pharmacological measures as appropriate and evaluate response  - Consider cultural and social influences on pain and pain management  - Notify physician/advanced practitioner if interventions unsuccessful or patient reports new pain  - Educate patient/family on pain management process including their role and importance of  reporting pain   - Provide non-pharmacologic/complimentary pain relief interventions  6/20/2025 1445 by Kenisha Yanes RN  Outcome: Adequate for Discharge  6/20/2025 0739 by Kenisha Yanes RN  Outcome: Progressing     Problem: INFECTION - ADULT  Goal: Absence or prevention of progression during hospitalization  Description: INTERVENTIONS:  - Assess and monitor for signs and symptoms of infection  - Monitor lab/diagnostic results  - Monitor all insertion sites, i.e. indwelling lines, tubes, and drains  - Monitor endotracheal if appropriate and nasal secretions for changes in amount and color  - [place] appropriate cooling/warming therapies per order  - Administer medications as ordered  - Instruct and encourage patient and family to use good hand hygiene technique  - Identify and instruct in appropriate isolation precautions for identified infection/condition  6/20/2025 1445 by Kenisha Yanes RN  Outcome: Adequate for Discharge  6/20/2025 0739 by Kenisha Yanes RN  Outcome: Progressing  Goal: Absence of fever/infection during neutropenic period  Description: INTERVENTIONS:  - Monitor WBC  - Perform strict hand hygiene  - Limit to healthy visitors only  - No plants, dried, fresh or silk flowers with mcclure in patient room  6/20/2025 1445 by Kenisha  JACKIE Yanes  Outcome: Adequate for Discharge  6/20/2025 0739 by Kenisha Yanes RN  Outcome: Progressing     Problem: SAFETY ADULT  Goal: Patient will remain free of falls  Description: INTERVENTIONS:  - Educate patient/family on patient safety including physical limitations  - Instruct patient to call for assistance with activity   - Consider consulting OT/PT to assist with strengthening/mobility based on AM PAC & JH-HLM score  - Consult OT/PT to assist with strengthening/mobility   - Keep Call bell within reach  - Keep bed low and locked with side rails adjusted as appropriate  - Keep care items and personal belongings within reach  - Initiate and maintain comfort rounds  - Make Fall Risk Sign visible to staff  - Offer Toileting every 2 Hours, in advance of need  - Initiate/Maintain bed alarm  - Obtain necessary fall risk management equipment: yellow socks.  - Apply yellow socks and bracelet for high fall risk patients  - Consider moving patient to room near nurses station  6/20/2025 1445 by Kenisha Yanes RN  Outcome: Adequate for Discharge  6/20/2025 0739 by Kenisha Yanes RN  Outcome: Progressing  Goal: Maintain or return to baseline ADL function  Description: INTERVENTIONS:  -  Assess patient's ability to carry out ADLs; assess patient's baseline for ADL function and identify physical deficits which impact ability to perform ADLs (bathing, care of mouth/teeth, toileting, grooming, dressing, etc.)  - Assess/evaluate cause of self-care deficits   - Assess range of motion  - Assess patient's mobility; develop plan if impaired  - Assess patient's need for assistive devices and provide as appropriate  - Encourage maximum independence but intervene and supervise when necessary  - Involve family in performance of ADLs  - Assess for home care needs following discharge   - Consider OT consult to assist with ADL evaluation and planning for discharge  - Provide patient education as appropriate  - Monitor  functional capacity and physical performance, use of AM PAC & -HLM   - Monitor gait, balance and fatigue with ambulation    6/20/2025 1445 by Kenisha Yanes RN  Outcome: Adequate for Discharge  6/20/2025 0739 by Kenisha Yanes RN  Outcome: Progressing  Goal: Maintains/Returns to pre admission functional level  Description: INTERVENTIONS:  - Perform AM-PAC 6 Click Basic Mobility/ Daily Activity assessment daily.  - Set and communicate daily mobility goal to care team and patient/family/caregiver.   - Collaborate with rehabilitation services on mobility goals if consulted  - Perform Range of Motion 3 times a day.  - Reposition patient every 2 hours.  - Dangle patient 3 times a day  - Stand patient 3 times a day  - Ambulate patient 3 times a day  - Out of bed to chair 3 times a day   - Out of bed for meals 3 times a day  - Out of bed for toileting  - Record patient progress and toleration of activity level   6/20/2025 1445 by Kenisha Yanes RN  Outcome: Adequate for Discharge  6/20/2025 0739 by Kenisha Yanes RN  Outcome: Progressing     Problem: DISCHARGE PLANNING  Goal: Discharge to home or other facility with appropriate resources  Description: INTERVENTIONS:  - Identify barriers to discharge w/patient and caregiver  - Arrange for needed discharge resources and transportation as appropriate  - Identify discharge learning needs (meds, wound care, etc.)  - Arrange for interpretive services to assist at discharge as needed  - Refer to Case Management Department for coordinating discharge planning if the patient needs post-hospital services based on physician/advanced practitioner order or complex needs related to functional status, cognitive ability, or social support system  6/20/2025 1445 by Kenisha Yanes RN  Outcome: Adequate for Discharge  6/20/2025 0739 by Kenisha Yanes RN  Outcome: Progressing     Problem: Knowledge Deficit  Goal: Patient/family/caregiver demonstrates understanding of  disease process, treatment plan, medications, and discharge instructions  Description: Complete learning assessment and assess knowledge base.  Interventions:  - Provide teaching at level of understanding  - Provide teaching via preferred learning methods  6/20/2025 1445 by Kenisha Yanes RN  Outcome: Adequate for Discharge  6/20/2025 0739 by Kenisha Yanes RN  Outcome: Progressing

## 2025-06-20 NOTE — ASSESSMENT & PLAN NOTE
Lab Results   Component Value Date    EGFR 18 06/20/2025    EGFR 20 06/19/2025    EGFR 19 06/18/2025    CREATININE 3.47 (H) 06/20/2025    CREATININE 3.27 (H) 06/19/2025    CREATININE 3.29 (H) 06/18/2025

## 2025-06-20 NOTE — PROGRESS NOTES
Progress Note - Hospitalist   Name: Wes Araujo 55 y.o. male I MRN: [id number]  Unit/Bed#: [place] [address] -01 I Date of Admission: 6/12/2025   Date of Service: 6/20/2025 I Hospital Day: 8    Assessment & Plan  Alcoholic hepatitis with ascites  Patient is a 55-year-old male with a past medical history of alcohol abuse, alcoholic liver cirrhosis, anemia, hypertension, type 2 diabetes, history of atrial fibrillation, chronic pancreatitis who presented to the hospital with abdominal pain and distention.  Overnight on the day of admission developed coffee-ground emesis and melena  Patient was admitted from 5/23/2025 to 6/6/2025 secondary to toxic metabolic encephalopathy secondary to hyperglycemia, HHNK, kidney injury and acute liver failure.  Received insulin, IV fluids, seen by gastroenterology, DF did not meet criteria for steroids, infection was ruled out, initial plan was for patient to be discharged to inpatient alcoholic rehab but patient refused and he was discharged home    6/12/2025 CT showed large volume ascites, increased since 6/3/2025   6/13/2025 underwent paracentesis 1670 cc clear fluid removed, no evidence of SBP, fluid cultures are pending  6/13/2025 underwent EGD which showed esophagitis.  No esophageal or gastric varices.  Ceftriaxone for 5 days for SBP prophylaxis in the setting of GI bleed  No indication for prednisolone  Due to worsening of kidney function the patient has been treated with albumin and octreotide.  Followed by GI  The patient underwent paracentesis June 18th for 5700 cc of fluid.  Coffee ground emesis  Night of admission 6/12/2025 developed coffee-ground emesis and melena  6/13/2025 underwent EGD which showed esophagitis, no esophageal or gastric varices.  Rest of the exam normal  The patient is being treated with pantoprazole.  He has had no further bleeding.  Melena  Secondary to severe esophagitis  See plan above  Anemia  Partly related to recent GI bleeding.  Bleeding has  abated.  Monitor hemoglobin.  CKD (chronic kidney disease) stage 4, GFR 15-29 ml/min ([facility])  Lab Results   Component Value Date    EGFR 18 06/20/2025    EGFR 20 06/19/2025    EGFR 19 06/18/2025    CREATININE 3.47 (H) 06/20/2025    CREATININE 3.27 (H) 06/19/2025    CREATININE 3.29 (H) 06/18/2025   Seen by nephrology on his previous admission, last seen on 6/6/2025  Per nephro: Baseline creatinine has been from 1.5-2.0 but on last admission his creatinine plateaued at 2.8-3.0 which may represent his new baseline due to his previous advanced CKD and recent ZENAIDA  He is high risk for dialysis in the near future  Nephrology consult is greatly appreciated.  The patient has been started on octreotide.  Alcohol abuse  Pt with history of alcohol abuse with withdrawal. He states he typically drinks multiple beer a day, but states he has not been drinking since he was discharged from the hospital last week  ETOH <10  Monitor on CIWA for now  Vitamin supplementation  Encourage continued cessation  Type 2 diabetes mellitus with hyperglycemia, with long-term current use of insulin ([facility])  Lab Results   Component Value Date    HGBA1C 9.6 (H) 05/26/2025       Recent Labs     06/19/25  1100 06/19/25  1610 06/19/25  2105 06/20/25  0549   POCGLU 421* 300* 123 159*       HHNK on his previous admission due to noncompliance and alcohol abuse  Home regimen is 70/30, 8 units twice daily, which was held due to coffee-ground emesis  Glucose has improved with adjustment of the patient's insulin yesterday.  Generalized weakness  Pt reports that since discharge he has been having difficulty with ambulation  Typically he ambulates without assistive devices, but he reports he has been struggling  PT/OT eval  Primary hypertension  Continue amlodipine 5 mg twice daily, hydralazine 25 mg 3 times daily, and Coreg 6.25 twice daily with holding parameters  Chronic pancreatitis ([facility])  Patient with a history of chronic pancreatitis secondary to alcohol    Chronic pancreatitis stable on CT imaging on admission    History of atrial fibrillation  History of atrial fibrillation, continue Coreg  Not on anticoagulation at baseline  Constipation  Pt reported no bowel movement for 6 days  Bowel regimen initiated  Lactulose has been added.  The patient did have a bowel movement today.  Severe protein-calorie malnutrition (HCC)  Malnutrition Findings:   Adult Malnutrition type: Acute illness  Adult Degree of Malnutrition: Other severe protein calorie malnutrition  Malnutrition Characteristics: Fat loss, Muscle loss, Inadequate energy     360 Statement: Severe calorie protein malnutrition in context of acute illness r/t inadequte PO intake in setting of abdominal distention/ pain as evidenced by  moderate body fat (triceps, ribcage area) and muscle mass depletions (temporal wasting, protruding clavicles) energy intake less than 50% compared to estimated needs>5 days; Treated with oral supplements    BMI Findings:    Body mass index is 21 kg/m².     Subjective:  The patient seems to feel better.  He has less abdominal pain and his bowels moved.  He is eating pretty well.  He denies any chest pain or shortness of breath.  He wants to go home to take a shower and come back in 2 days.  I advised him that this was not a good idea and he should take a shower here in the hospital.    Physical Exam:   Temp:  [98.2 °F (36.8 °C)-99.4 °F (37.4 °C)] 99.4 °F (37.4 °C)  HR:  [72-79] 74  BP: (140-161)/(72-82) 144/72  Resp:  [16] 16  SpO2:  [94 %-98 %] 95 %  O2 Device: None (Room air)    Gen: Well-developed, chronically ill, in no acute distress.  Neck: Supple.  No lymphadenopathy or goiter.  Heart: Giller rhythm.  I heard no murmur or gallop.  Lungs: Clear to auscultation and percussion.  No wheezing, rales, or rhonchi.  Abd: Soft with active bowel sounds.  No mass or tenderness.  Extremities: No clubbing, cyanosis, or edema.  No calf tenderness.  Neuro: Oriented.  No focal sign.  Skin:  Warm and dry.      LABS:   CMP:   Lab Results   Component Value Date    SODIUM 139 06/20/2025    K 5.1 06/20/2025     06/20/2025    CO2 23 06/20/2025    BUN 42 (H) 06/20/2025    CREATININE 3.47 (H) 06/20/2025    CALCIUM 9.5 06/20/2025    EGFR 18 06/20/2025       VTE Pharmacologic Prophylaxis: Sequential compression device (Venodyne)   VTE Mechanical Prophylaxis: sequential compression device

## 2025-06-20 NOTE — ASSESSMENT & PLAN NOTE
55-year-old male with a past medical history of hypertension, diabetes mellitus type 2, chronic pancreatitis secondary to chronic alcohol use and recent admission for alc hep with likely ZENAIDA on CKD (creatinine 3.01 at discharge during previous hospitalization, previously 0.8-1 in 2024 but more recently 1.7-2) who presented to [facility] on 6/12 for concern of recurrent abdominal distension with associated pain in the setting of constipation.   Labs on presentation significant for continued improvement in AST/ALT/ALP since last admission, however, bilirubin continues to increase.  INR 1.18 on presentation (peaked last admission at 5.53 with dramatic improvement after Vitamin K). Kidney function appeared to be improving from previous admission at which time creatinine peaked at 3.01, but is now worsening rising to 3.47 today.    Patient previously underwent serologic liver workup negative for alternative cause of liver injury with elevations thought to be 2/2 alc hep in setting of some degree of underlying fibrosis.  Patient was treated with conservative management without steroids due to low Maddrey's score.  Patient was recommended to follow-up with gastroenterology but now presenting with recurrent abdominal distention/pain along with coffee-ground emesis and melena on admission leading to EGD without evidence of varices, but showing grade D esophagitis.  Hospitalization further complicated by continued abdominal pain likely in the setting of constipation as well as worsening ZENAIDA with concern for HRS now on octreotide and albumin.    Maddrey's Discriminant Function - Control Prothrombin 13: 28.78 at 6/18/2025  6:24 AM  Calculated from:  Total Bilirubin: 13.6 mg/dL at 6/18/2025  6:24 AM  Prothrombin Time: 16.3 seconds at 6/18/2025  6:24 AM  MDF = (4.6 * (PT - Control PT)) + Bilirubin     MELD 3.0: 32 at 6/20/2025  5:53 AM  MELD-Na: 31 at 6/20/2025  5:53 AM  Calculated from:  Serum Creatinine: 3.47 mg/dL (Using max of 3  mg/dL) at 6/20/2025  5:53 AM  Serum Sodium: 139 mmol/L (Using max of 137 mmol/L) at 6/20/2025  5:53 AM  Total Bilirubin: 13.6 mg/dL at 6/18/2025  6:24 AM  Serum Albumin: 3.7 g/dL (Using max of 3.5 g/dL) at 6/18/2025  6:24 AM  INR(ratio): 1.29 at 6/18/2025  6:24 AM  Age at listing (hypothetical): 55 years  Sex: Male at 6/20/2025  5:53 AM    # Elevated Liver Chemistries - Alcohol hepatitis:   Monitor and trend LFTs daily along with INR; no indication for prednisolone  Avoid hepatotoxic medications, strongly encourage complete alcohol cessation  Consider liver biopsy outpatient given significant alkaline phosphatase elevation prior to admission  Recommend complete alcohol cessation moving forward.  Will check Peth this patient states he has not consumed alcohol since before hospitalization last month when he presented on 5/23  Additional pain and symptom management per primary team    # Ascites with ZENAIDA/CKD:   Status post IR paracentesis on 6/13/2025 with removal of 1670 cc, negative for SBP  Repeat paracentesis on 6/18 with removal of 5700 cc without evidence of SBP  Continue 25% albumin 25 g x3 doses due to worsening creatinine; diuretics currently on hold.  No need for midodrine as MAPs adequate  Nephrology following  Monitor and trend serum creatinine and electrolytes daily, monitor urine output    # Coffee Ground Emesis and Melena:  Status post EGD 6/13/2025 with evidence of grade D esophagitis; history of gastric dieulafoy lesion as well 2/2025  Continue on Protonix 40 mg twice daily by mouth  Status post ceftriaxone x 5 days for SBP prophylaxis in setting of GI bleed and severe alcohol hepatitis  Monitor and trend hemoglobin, transfuse for goal greater than 7  Alert GI team of any concern for recurrent GI bleeding    # Transplant  Patient with history of alcoholic hepatitis and now concern for HRS which may potentiate need for transplant.  He has recent alcohol use within the past 3 weeks  Follow up Peth from  6/18  Discussed concern with family at bedside including parents and brother.  He has a good support system from all family members including parents who he lives with.  They have encouraged him recently to stop alcohol consumption and he is now adamant that he will not drink again  No previous DUIs or legal trouble associated with drinking.  No previous stays in alcohol rehabilitation  He has multiple hospitalizations each year in the setting of alcohol use with encephalopathy, hypoglycemia as well as more recently alcoholic hepatitis.  More unfortunately, he has poor insight into how severe his condition is and frequently becomes hyperfocused on certain unrelated matters.  During multiple conversations today with , he solely focused on the need to shower stating he needed to be discharged for 2 days to go home to do this.  Tried to reason with him by stating we can have him shower here as discharge obviously was not feasible in his current condition, but he was undeterred.  Reviewed case with Dr. Yola Paige, hepatology, who stated although HRS less likely with normal sodium, elevated urine sodium, it would be reasonable to reach out to [facility] to discuss for potential transfer for transplant evaluation, but only if patient understanding of severity of condition, with complete buy in from pt as well as family support.  Unfortunately during continued discussions, which were unfruitful, patient left AMA.

## 2025-06-21 LAB
BACTERIA SPEC BFLD CULT: NO GROWTH
GRAM STN SPEC: NORMAL
GRAM STN SPEC: NORMAL

## 2025-06-21 NOTE — DISCHARGE SUMMARY
Discharge Summary - Wes Araujo, 1970, [id number]        Admission Date: 6/12/2025  Discharge Date: 6/20/2025    Discharge Diagnosis:   1.  Alcoholic hepatitis with ascites  2.  Hematemesis secondary to severe esophagitis  3.  Chronic kidney disease stage IV with acute kidney injury  4.  Alcohol abuse disorder  5.  Type 2 diabetes  6.  Hypertension  7.  Chronic pancreatitis  8.  Atrial fibrillation  9.  Constipation  10.  Severe protein calorie malnutrition    Consulting Physicians:  Dr. Madden, nephrology   Dr. Don, gastroenterology    Procedures Performed:   EGD  Paracentesis x 2    HPI: The patient is a 55-year-old man with a history of alcoholic liver disease who came to the emergency room because of increasing abdominal distention.  He was found to have ascites.  He was admitted for further evaluation.    Hospital Course: The patient was admitted to the hospital and monitored carefully.  He promptly developed hematemesis.  He was seen by gastroenterology.  EGD was recommended to further evaluate his GI bleeding.  He was treated with pantoprazole.  EGD showed severe esophagitis.  With pantoprazole therapy, the patient's bleeding abated.    The patient's initial presentation was one of his abdominal distention and generalized weakness.  The patient was found to have ascites.  He was also found to have laboratory evidence of alcoholic hepatitis.  The patient underwent paracentesis on 2 occasions.  There was no evidence of SBP.  The patient was treated with albumin and later octreotide.    The patient has advanced chronic kidney disease.  He developed acute kidney injury.  He was evaluated by Dr. Madden.  He was treated with octreotide and albumin.  Despite this, his kidney function did not improve.  The patient was sufficiently ill that the transplant center was contacted to see if he might qualify for expedited transplantation.    Unfortunately, on June 20, the patient decided to go home.  He said that  he had to go home and take a shower because he had not had a shower since he came to the hospital.  I told him that we had showers at the hospital and we could certainly arrange for him to take 1 here.  I told him that I thought that it was reasonably likely that he would die if he went home.  Despite this clear explanation, the patient decided to leave the hospital anyway.  I encouraged him to make prompt follow-up arrangements both with his primary care provider and with GI and nephrology.      Disposition: The patient left the hospital AMA on June 20.  He was asked to follow a low-salt diet.  His activity will be as tolerated.  He was strongly encouraged to make prompt follow-up arrangements.  He was told that should he feel worse he could return to the hospital    Discharge instructions/Information to patient and family:   See after visit summary for information provided to patient and family.      Provisions for Follow-Up Care:  See after visit summary for information related to follow-up care and any pertinent home health orders.      Planned Readmission: No    Discharge Statement   I spent 40 minutes discharging the patient. This time was spent on the day of discharge. I had direct contact with the patient on the day of discharge.     Discharge Medications:  See after visit summary for reconciled discharge medications provided to patient and family.

## 2025-06-23 ENCOUNTER — TELEPHONE (OUTPATIENT)
Dept: NEPHROLOGY | Facility: CLINIC | Age: 55
End: 2025-06-23

## 2025-06-23 ENCOUNTER — TRANSITIONAL CARE MANAGEMENT (OUTPATIENT)
Dept: FAMILY MEDICINE CLINIC | Facility: CLINIC | Age: 55
End: 2025-06-23

## 2025-06-23 DIAGNOSIS — N18.4 CKD (CHRONIC KIDNEY DISEASE) STAGE 4, GFR 15-29 ML/MIN (HCC): Primary | ICD-10-CM

## 2025-06-23 DIAGNOSIS — N18.31 CKD STAGE 3A, GFR 45-59 ML/MIN (HCC): ICD-10-CM

## 2025-06-23 DIAGNOSIS — E87.5 HYPERKALEMIA: ICD-10-CM

## 2025-06-23 DIAGNOSIS — E87.1 HYPONATREMIA: ICD-10-CM

## 2025-06-23 LAB
PETH BLD QL SCN: NEGATIVE
PETH BLD-MCNC: NEGATIVE NG/ML

## 2025-06-23 NOTE — UTILIZATION REVIEW
NOTIFICATION OF ADMISSION DISCHARGE   This is a Notification of Discharge from [facility]. Please be advised that this patient has been discharge from our facility. Below you will find the admission and discharge date and time including the patient’s disposition.   UTILIZATION REVIEW CONTACT:  Utilization Review Assistants  Network Utilization Review Department  Phone: [phone] x carefully listen to the prompts. All voicemails are confidential.  Email: [email or web address]     ADMISSION INFORMATION  PRESENTATION DATE: 6/12/2025  3:30 PM  OBERVATION ADMISSION DATE: N/A  INPATIENT ADMISSION DATE: 6/12/25 10:31 PM   DISCHARGE DATE: 6/20/2025  2:48 PM   DISPOSITION:Left against medical advice or discontinued care    Network Utilization Review Department  ATTENTION: Please call with any questions or concerns to [phone] and carefully listen to the prompts so that you are directed to the right person. All voicemails are confidential.   For Discharge needs, contact Care Management DC Support Team at [phone] opt. 2  Send all requests for admission clinical reviews, approved or denied determinations and any other requests to dedicated fax number below belonging to the campus where the patient is receiving treatment. List of dedicated fax numbers for the Facilities:  FACILITY NAME UR FAX NUMBER   ADMISSION DENIALS (Administrative/Medical Necessity) [phone]   DISCHARGE SUPPORT TEAM ([facility]) [phone]   PARENT CHILD HEALTH (Maternity/NICU/Pediatrics) [phone]   [facility] [phone]   [facility] [phone]   [facility] [phone]   [facility] [phone]   [facility] [phone]   [facility] [phone]   [facility] [phone]   [facility]  [facility] [phone]   [facility] [phone]   [facility] [phone]   [facility] [phone]   [facility] [phone]

## 2025-06-23 NOTE — TELEPHONE ENCOUNTER
Spoke to the patients father about scheduling a follow up and blood work weekly  x 4. Patients Father states that patients is currently sleeping and that he will have patient call the office when he wakes up.           ----- Message from Kiran Madden MD sent at 6/22/2025 10:34 PM EDT -----  Regarding: Hospital follow up  This patient was seen at [facility] for ZENAIDA.   He speaks only Nauruan.   Please arrange for follow up in the office in 2-4 weeks.   Please call him and have him do weekly CMP x 4.  
Negative

## 2025-07-01 ENCOUNTER — HOSPITAL ENCOUNTER (INPATIENT)
Facility: HOSPITAL | Age: 55
LOS: 3 days | Discharge: ANOTHER HOSPITAL | End: 2025-07-05
Attending: EMERGENCY MEDICINE | Admitting: INTERNAL MEDICINE
Payer: COMMERCIAL

## 2025-07-01 ENCOUNTER — APPOINTMENT (EMERGENCY)
Dept: CT IMAGING | Facility: HOSPITAL | Age: 55
End: 2025-07-01
Payer: COMMERCIAL

## 2025-07-01 DIAGNOSIS — K70.11 ASCITES DUE TO ALCOHOLIC HEPATITIS: Primary | ICD-10-CM

## 2025-07-01 DIAGNOSIS — Z79.4 TYPE 2 DIABETES MELLITUS WITH HYPERGLYCEMIA, WITH LONG-TERM CURRENT USE OF INSULIN (HCC): ICD-10-CM

## 2025-07-01 DIAGNOSIS — E11.65 TYPE 2 DIABETES MELLITUS WITH HYPERGLYCEMIA, WITH LONG-TERM CURRENT USE OF INSULIN (HCC): ICD-10-CM

## 2025-07-01 DIAGNOSIS — N18.4 CKD (CHRONIC KIDNEY DISEASE) STAGE 4, GFR 15-29 ML/MIN (HCC): ICD-10-CM

## 2025-07-01 DIAGNOSIS — N18.9 CHRONIC RENAL DISEASE: ICD-10-CM

## 2025-07-01 DIAGNOSIS — K86.1 CHRONIC PANCREATITIS (HCC): ICD-10-CM

## 2025-07-01 LAB
2HR DELTA HS TROPONIN: 1 NG/L
ALBUMIN SERPL BCG-MCNC: 4.1 G/DL (ref 3.5–5)
ALP SERPL-CCNC: 275 U/L (ref 34–104)
ALT SERPL W P-5'-P-CCNC: 33 U/L (ref 7–52)
AMMONIA PLAS-SCNC: 43 UMOL/L (ref 18–72)
ANION GAP SERPL CALCULATED.3IONS-SCNC: 10 MMOL/L (ref 4–13)
APTT PPP: 33 SECONDS (ref 23–34)
AST SERPL W P-5'-P-CCNC: 60 U/L (ref 13–39)
BASOPHILS # BLD AUTO: 0.08 THOUSANDS/ÂΜL (ref 0–0.1)
BASOPHILS NFR BLD AUTO: 0 % (ref 0–1)
BILIRUB SERPL-MCNC: 24.76 MG/DL (ref 0.2–1)
BUN SERPL-MCNC: 55 MG/DL (ref 5–25)
CALCIUM SERPL-MCNC: 10.1 MG/DL (ref 8.4–10.2)
CARDIAC TROPONIN I PNL SERPL HS: 10 NG/L (ref ?–50)
CARDIAC TROPONIN I PNL SERPL HS: 11 NG/L (ref ?–50)
CHLORIDE SERPL-SCNC: 103 MMOL/L (ref 96–108)
CO2 SERPL-SCNC: 17 MMOL/L (ref 21–32)
CREAT SERPL-MCNC: 3.91 MG/DL (ref 0.6–1.3)
EOSINOPHIL # BLD AUTO: 0.29 THOUSAND/ÂΜL (ref 0–0.61)
EOSINOPHIL NFR BLD AUTO: 2 % (ref 0–6)
ERYTHROCYTE [DISTWIDTH] IN BLOOD BY AUTOMATED COUNT: 15.1 % (ref 11.6–15.1)
ETHANOL SERPL-MCNC: <10 MG/DL
GFR SERPL CREATININE-BSD FRML MDRD: 16 ML/MIN/1.73SQ M
GLUCOSE SERPL-MCNC: 232 MG/DL (ref 65–140)
GLUCOSE SERPL-MCNC: 245 MG/DL (ref 65–140)
HCT VFR BLD AUTO: 29.3 % (ref 36.5–49.3)
HGB BLD-MCNC: 10 G/DL (ref 12–17)
IMM GRANULOCYTES # BLD AUTO: 0.1 THOUSAND/UL (ref 0–0.2)
IMM GRANULOCYTES NFR BLD AUTO: 1 % (ref 0–2)
INR PPP: 1.33 (ref 0.85–1.19)
LIPASE SERPL-CCNC: <6 U/L (ref 11–82)
LYMPHOCYTES # BLD AUTO: 1.82 THOUSANDS/ÂΜL (ref 0.6–4.47)
LYMPHOCYTES NFR BLD AUTO: 10 % (ref 14–44)
MCH RBC QN AUTO: 32.1 PG (ref 26.8–34.3)
MCHC RBC AUTO-ENTMCNC: 34.1 G/DL (ref 31.4–37.4)
MCV RBC AUTO: 94 FL (ref 82–98)
MONOCYTES # BLD AUTO: 1.6 THOUSAND/ÂΜL (ref 0.17–1.22)
MONOCYTES NFR BLD AUTO: 9 % (ref 4–12)
NEUTROPHILS # BLD AUTO: 15 THOUSANDS/ÂΜL (ref 1.85–7.62)
NEUTS SEG NFR BLD AUTO: 78 % (ref 43–75)
NRBC BLD AUTO-RTO: 0 /100 WBCS
PLATELET # BLD AUTO: 277 THOUSANDS/UL (ref 149–390)
PMV BLD AUTO: 12.1 FL (ref 8.9–12.7)
POTASSIUM SERPL-SCNC: 6.9 MMOL/L (ref 3.5–5.3)
POTASSIUM SERPL-SCNC: 7 MMOL/L (ref 3.5–5.3)
PROT SERPL-MCNC: 7 G/DL (ref 6.4–8.4)
PROTHROMBIN TIME: 16.6 SECONDS (ref 12.3–15)
RBC # BLD AUTO: 3.12 MILLION/UL (ref 3.88–5.62)
SODIUM SERPL-SCNC: 130 MMOL/L (ref 135–147)
WBC # BLD AUTO: 18.89 THOUSAND/UL (ref 4.31–10.16)

## 2025-07-01 PROCEDURE — 84484 ASSAY OF TROPONIN QUANT: CPT

## 2025-07-01 PROCEDURE — 82140 ASSAY OF AMMONIA: CPT

## 2025-07-01 PROCEDURE — 93005 ELECTROCARDIOGRAM TRACING: CPT

## 2025-07-01 PROCEDURE — 85730 THROMBOPLASTIN TIME PARTIAL: CPT

## 2025-07-01 PROCEDURE — 36415 COLL VENOUS BLD VENIPUNCTURE: CPT

## 2025-07-01 PROCEDURE — 85025 COMPLETE CBC W/AUTO DIFF WBC: CPT

## 2025-07-01 PROCEDURE — 85610 PROTHROMBIN TIME: CPT

## 2025-07-01 PROCEDURE — 96365 THER/PROPH/DIAG IV INF INIT: CPT

## 2025-07-01 PROCEDURE — 96376 TX/PRO/DX INJ SAME DRUG ADON: CPT

## 2025-07-01 PROCEDURE — 96375 TX/PRO/DX INJ NEW DRUG ADDON: CPT

## 2025-07-01 PROCEDURE — 99285 EMERGENCY DEPT VISIT HI MDM: CPT

## 2025-07-01 PROCEDURE — 84132 ASSAY OF SERUM POTASSIUM: CPT

## 2025-07-01 PROCEDURE — 82077 ASSAY SPEC XCP UR&BREATH IA: CPT

## 2025-07-01 PROCEDURE — 83690 ASSAY OF LIPASE: CPT

## 2025-07-01 PROCEDURE — 94644 CONT INHLJ TX 1ST HOUR: CPT

## 2025-07-01 PROCEDURE — 80053 COMPREHEN METABOLIC PANEL: CPT

## 2025-07-01 PROCEDURE — 74176 CT ABD & PELVIS W/O CONTRAST: CPT

## 2025-07-01 PROCEDURE — 71250 CT THORAX DX C-: CPT

## 2025-07-01 PROCEDURE — 82948 REAGENT STRIP/BLOOD GLUCOSE: CPT

## 2025-07-01 RX ORDER — DEXTROSE MONOHYDRATE 25 G/50ML
25 INJECTION, SOLUTION INTRAVENOUS ONCE
Status: COMPLETED | OUTPATIENT
Start: 2025-07-01 | End: 2025-07-01

## 2025-07-01 RX ORDER — ONDANSETRON 2 MG/ML
4 INJECTION INTRAMUSCULAR; INTRAVENOUS ONCE
Status: COMPLETED | OUTPATIENT
Start: 2025-07-01 | End: 2025-07-01

## 2025-07-01 RX ORDER — CALCIUM GLUCONATE 20 MG/ML
1 INJECTION, SOLUTION INTRAVENOUS ONCE
Status: COMPLETED | OUTPATIENT
Start: 2025-07-01 | End: 2025-07-02

## 2025-07-01 RX ORDER — AMLODIPINE BESYLATE 5 MG/1
5 TABLET ORAL 2 TIMES DAILY
Status: DISCONTINUED | OUTPATIENT
Start: 2025-07-01 | End: 2025-07-01

## 2025-07-01 RX ORDER — HYDRALAZINE HYDROCHLORIDE 20 MG/ML
5 INJECTION INTRAMUSCULAR; INTRAVENOUS ONCE
Status: COMPLETED | OUTPATIENT
Start: 2025-07-02 | End: 2025-07-01

## 2025-07-01 RX ORDER — ALBUTEROL SULFATE 5 MG/ML
10 SOLUTION RESPIRATORY (INHALATION) ONCE
Status: COMPLETED | OUTPATIENT
Start: 2025-07-01 | End: 2025-07-01

## 2025-07-01 RX ORDER — HYDRALAZINE HYDROCHLORIDE 25 MG/1
25 TABLET, FILM COATED ORAL EVERY 8 HOURS SCHEDULED
Status: DISCONTINUED | OUTPATIENT
Start: 2025-07-01 | End: 2025-07-01

## 2025-07-01 RX ORDER — CARVEDILOL 6.25 MG/1
6.25 TABLET ORAL 2 TIMES DAILY WITH MEALS
Status: DISCONTINUED | OUTPATIENT
Start: 2025-07-01 | End: 2025-07-01

## 2025-07-01 RX ADMIN — HYDRALAZINE HYDROCHLORIDE 5 MG: 20 INJECTION, SOLUTION INTRAMUSCULAR; INTRAVENOUS at 23:57

## 2025-07-01 RX ADMIN — ONDANSETRON 4 MG: 2 INJECTION INTRAMUSCULAR; INTRAVENOUS at 23:39

## 2025-07-01 RX ADMIN — ALBUTEROL SULFATE 10 MG: 2.5 SOLUTION RESPIRATORY (INHALATION) at 23:23

## 2025-07-01 RX ADMIN — INSULIN HUMAN 6.45 UNITS: 100 INJECTION, SOLUTION PARENTERAL at 23:28

## 2025-07-01 RX ADMIN — CALCIUM GLUCONATE 1 G: 20 INJECTION, SOLUTION INTRAVENOUS at 23:29

## 2025-07-01 RX ADMIN — ONDANSETRON 4 MG: 2 INJECTION INTRAMUSCULAR; INTRAVENOUS at 20:26

## 2025-07-01 RX ADMIN — DEXTROSE MONOHYDRATE 25 ML: 25 INJECTION, SOLUTION INTRAVENOUS at 23:25

## 2025-07-01 RX ADMIN — ONDANSETRON 4 MG: 2 INJECTION INTRAMUSCULAR; INTRAVENOUS at 21:47

## 2025-07-01 NOTE — Clinical Note
parviz bourne accompanied Wesmary Bourne to the emergency department on 7/1/2025.    Return date if applicable:         If you have any questions or concerns, please don't hesitate to call.      Memo Nunez RN

## 2025-07-01 NOTE — Clinical Note
accompanied Wes Araujo to the emergency department on 7/1/2025.    Return date if applicable:         If you have any questions or concerns, please don't hesitate to call.      Memo Nunez RN

## 2025-07-02 PROBLEM — N18.4 ACUTE RENAL FAILURE SUPERIMPOSED ON STAGE 4 CHRONIC KIDNEY DISEASE (HCC): Status: ACTIVE | Noted: 2019-02-26

## 2025-07-02 PROBLEM — E44.0 MODERATE PROTEIN-CALORIE MALNUTRITION (HCC): Status: ACTIVE | Noted: 2025-07-02

## 2025-07-02 LAB
ALBUMIN SERPL BCG-MCNC: 4.1 G/DL (ref 3.5–5)
ALP SERPL-CCNC: 288 U/L (ref 34–104)
ALT SERPL W P-5'-P-CCNC: 33 U/L (ref 7–52)
ANION GAP SERPL CALCULATED.3IONS-SCNC: 10 MMOL/L (ref 4–13)
ANION GAP SERPL CALCULATED.3IONS-SCNC: 9 MMOL/L (ref 4–13)
AST SERPL W P-5'-P-CCNC: 72 U/L (ref 13–39)
ATRIAL RATE: 70 BPM
ATRIAL RATE: 70 BPM
ATRIAL RATE: 76 BPM
BASOPHILS # BLD AUTO: 0.06 THOUSANDS/ÂΜL (ref 0–0.1)
BASOPHILS NFR BLD AUTO: 0 % (ref 0–1)
BILIRUB SERPL-MCNC: 24.11 MG/DL (ref 0.2–1)
BUN SERPL-MCNC: 51 MG/DL (ref 5–25)
BUN SERPL-MCNC: 52 MG/DL (ref 5–25)
CALCIUM SERPL-MCNC: 9.6 MG/DL (ref 8.4–10.2)
CALCIUM SERPL-MCNC: 9.8 MG/DL (ref 8.4–10.2)
CHLORIDE SERPL-SCNC: 105 MMOL/L (ref 96–108)
CHLORIDE SERPL-SCNC: 107 MMOL/L (ref 96–108)
CO2 SERPL-SCNC: 14 MMOL/L (ref 21–32)
CO2 SERPL-SCNC: 16 MMOL/L (ref 21–32)
CREAT SERPL-MCNC: 3.71 MG/DL (ref 0.6–1.3)
CREAT SERPL-MCNC: 3.93 MG/DL (ref 0.6–1.3)
EOSINOPHIL # BLD AUTO: 0.2 THOUSAND/ÂΜL (ref 0–0.61)
EOSINOPHIL NFR BLD AUTO: 1 % (ref 0–6)
ERYTHROCYTE [DISTWIDTH] IN BLOOD BY AUTOMATED COUNT: 14.9 % (ref 11.6–15.1)
GFR SERPL CREATININE-BSD FRML MDRD: 16 ML/MIN/1.73SQ M
GFR SERPL CREATININE-BSD FRML MDRD: 17 ML/MIN/1.73SQ M
GLUCOSE SERPL-MCNC: 132 MG/DL (ref 65–140)
GLUCOSE SERPL-MCNC: 168 MG/DL (ref 65–140)
GLUCOSE SERPL-MCNC: 190 MG/DL (ref 65–140)
GLUCOSE SERPL-MCNC: 208 MG/DL (ref 65–140)
GLUCOSE SERPL-MCNC: 210 MG/DL (ref 65–140)
GLUCOSE SERPL-MCNC: 231 MG/DL (ref 65–140)
GLUCOSE SERPL-MCNC: 252 MG/DL (ref 65–140)
HCT VFR BLD AUTO: 32.2 % (ref 36.5–49.3)
HGB BLD-MCNC: 10.9 G/DL (ref 12–17)
IMM GRANULOCYTES # BLD AUTO: 0.13 THOUSAND/UL (ref 0–0.2)
IMM GRANULOCYTES NFR BLD AUTO: 1 % (ref 0–2)
LYMPHOCYTES # BLD AUTO: 1.95 THOUSANDS/ÂΜL (ref 0.6–4.47)
LYMPHOCYTES NFR BLD AUTO: 11 % (ref 14–44)
MAGNESIUM SERPL-MCNC: 2.7 MG/DL (ref 1.9–2.7)
MCH RBC QN AUTO: 32 PG (ref 26.8–34.3)
MCHC RBC AUTO-ENTMCNC: 33.9 G/DL (ref 31.4–37.4)
MCV RBC AUTO: 94 FL (ref 82–98)
MONOCYTES # BLD AUTO: 1.32 THOUSAND/ÂΜL (ref 0.17–1.22)
MONOCYTES NFR BLD AUTO: 8 % (ref 4–12)
NEUTROPHILS # BLD AUTO: 13.72 THOUSANDS/ÂΜL (ref 1.85–7.62)
NEUTS SEG NFR BLD AUTO: 79 % (ref 43–75)
NRBC BLD AUTO-RTO: 0 /100 WBCS
P AXIS: 57 DEGREES
P AXIS: 66 DEGREES
P AXIS: 70 DEGREES
PHOSPHATE SERPL-MCNC: 2.8 MG/DL (ref 2.7–4.5)
PLATELET # BLD AUTO: 276 THOUSANDS/UL (ref 149–390)
PMV BLD AUTO: 11.8 FL (ref 8.9–12.7)
POTASSIUM SERPL-SCNC: 5.4 MMOL/L (ref 3.5–5.3)
POTASSIUM SERPL-SCNC: 5.6 MMOL/L (ref 3.5–5.3)
POTASSIUM SERPL-SCNC: 5.6 MMOL/L (ref 3.5–5.3)
POTASSIUM SERPL-SCNC: 8.7 MMOL/L (ref 3.5–5.3)
PR INTERVAL: 136 MS
PR INTERVAL: 136 MS
PR INTERVAL: 144 MS
PROT SERPL-MCNC: 7.5 G/DL (ref 6.4–8.4)
QRS AXIS: -10 DEGREES
QRS AXIS: -4 DEGREES
QRS AXIS: 17 DEGREES
QRSD INTERVAL: 84 MS
QRSD INTERVAL: 90 MS
QRSD INTERVAL: 92 MS
QT INTERVAL: 396 MS
QT INTERVAL: 424 MS
QT INTERVAL: 428 MS
QTC INTERVAL: 446 MS
QTC INTERVAL: 457 MS
QTC INTERVAL: 462 MS
RBC # BLD AUTO: 3.41 MILLION/UL (ref 3.88–5.62)
SODIUM SERPL-SCNC: 128 MMOL/L (ref 135–147)
SODIUM SERPL-SCNC: 133 MMOL/L (ref 135–147)
T WAVE AXIS: -1 DEGREES
T WAVE AXIS: -7 DEGREES
T WAVE AXIS: 15 DEGREES
VENTRICULAR RATE: 70 BPM
VENTRICULAR RATE: 70 BPM
VENTRICULAR RATE: 76 BPM
WBC # BLD AUTO: 17.38 THOUSAND/UL (ref 4.31–10.16)

## 2025-07-02 PROCEDURE — 84132 ASSAY OF SERUM POTASSIUM: CPT | Performed by: PHYSICIAN ASSISTANT

## 2025-07-02 PROCEDURE — 93010 ELECTROCARDIOGRAM REPORT: CPT | Performed by: INTERNAL MEDICINE

## 2025-07-02 PROCEDURE — 99285 EMERGENCY DEPT VISIT HI MDM: CPT

## 2025-07-02 PROCEDURE — 84132 ASSAY OF SERUM POTASSIUM: CPT | Performed by: INTERNAL MEDICINE

## 2025-07-02 PROCEDURE — 99255 IP/OBS CONSLTJ NEW/EST HI 80: CPT | Performed by: INTERNAL MEDICINE

## 2025-07-02 PROCEDURE — 80048 BASIC METABOLIC PNL TOTAL CA: CPT | Performed by: PHYSICIAN ASSISTANT

## 2025-07-02 PROCEDURE — 82948 REAGENT STRIP/BLOOD GLUCOSE: CPT

## 2025-07-02 PROCEDURE — 99223 1ST HOSP IP/OBS HIGH 75: CPT | Performed by: INTERNAL MEDICINE

## 2025-07-02 PROCEDURE — 85025 COMPLETE CBC W/AUTO DIFF WBC: CPT

## 2025-07-02 PROCEDURE — 93005 ELECTROCARDIOGRAM TRACING: CPT

## 2025-07-02 PROCEDURE — 83735 ASSAY OF MAGNESIUM: CPT

## 2025-07-02 PROCEDURE — 84100 ASSAY OF PHOSPHORUS: CPT

## 2025-07-02 PROCEDURE — 36415 COLL VENOUS BLD VENIPUNCTURE: CPT

## 2025-07-02 PROCEDURE — 80053 COMPREHEN METABOLIC PANEL: CPT

## 2025-07-02 RX ORDER — SODIUM BICARBONATE 650 MG/1
1300 TABLET ORAL
Status: DISCONTINUED | OUTPATIENT
Start: 2025-07-02 | End: 2025-07-04

## 2025-07-02 RX ORDER — LANOLIN ALCOHOL/MO/W.PET/CERES
50 CREAM (GRAM) TOPICAL DAILY
Status: DISCONTINUED | OUTPATIENT
Start: 2025-07-02 | End: 2025-07-05 | Stop reason: HOSPADM

## 2025-07-02 RX ORDER — FOLIC ACID 1 MG/1
1 TABLET ORAL DAILY
Status: DISCONTINUED | OUTPATIENT
Start: 2025-07-02 | End: 2025-07-05 | Stop reason: HOSPADM

## 2025-07-02 RX ORDER — SODIUM POLYSTYRENE SULFONATE 4.1 MEQ/G
30 POWDER, FOR SUSPENSION ORAL; RECTAL ONCE
Status: COMPLETED | OUTPATIENT
Start: 2025-07-02 | End: 2025-07-02

## 2025-07-02 RX ORDER — INSULIN LISPRO 100 [IU]/ML
1-5 INJECTION, SOLUTION INTRAVENOUS; SUBCUTANEOUS
Status: DISCONTINUED | OUTPATIENT
Start: 2025-07-02 | End: 2025-07-05 | Stop reason: HOSPADM

## 2025-07-02 RX ORDER — INDOMETHACIN 25 MG/1
25 CAPSULE ORAL ONCE
Status: COMPLETED | OUTPATIENT
Start: 2025-07-02 | End: 2025-07-02

## 2025-07-02 RX ORDER — ALBUMIN (HUMAN) 12.5 G/50ML
12.5 SOLUTION INTRAVENOUS EVERY 8 HOURS
Status: COMPLETED | OUTPATIENT
Start: 2025-07-02 | End: 2025-07-04

## 2025-07-02 RX ORDER — ACETAMINOPHEN 325 MG/1
650 TABLET ORAL EVERY 6 HOURS PRN
Status: DISCONTINUED | OUTPATIENT
Start: 2025-07-02 | End: 2025-07-05 | Stop reason: HOSPADM

## 2025-07-02 RX ORDER — ALBUMIN (HUMAN) 12.5 G/50ML
12.5 SOLUTION INTRAVENOUS ONCE
Status: CANCELLED | OUTPATIENT
Start: 2025-07-02 | End: 2025-07-02

## 2025-07-02 RX ORDER — BUMETANIDE 0.25 MG/ML
0.5 INJECTION, SOLUTION INTRAMUSCULAR; INTRAVENOUS ONCE
Status: COMPLETED | OUTPATIENT
Start: 2025-07-02 | End: 2025-07-02

## 2025-07-02 RX ORDER — HYDROMORPHONE HCL IN WATER/PF 6 MG/30 ML
0.2 PATIENT CONTROLLED ANALGESIA SYRINGE INTRAVENOUS EVERY 8 HOURS PRN
Status: DISCONTINUED | OUTPATIENT
Start: 2025-07-02 | End: 2025-07-05 | Stop reason: HOSPADM

## 2025-07-02 RX ORDER — MAGNESIUM HYDROXIDE/ALUMINUM HYDROXICE/SIMETHICONE 120; 1200; 1200 MG/30ML; MG/30ML; MG/30ML
30 SUSPENSION ORAL EVERY 6 HOURS PRN
Status: DISCONTINUED | OUTPATIENT
Start: 2025-07-02 | End: 2025-07-05 | Stop reason: HOSPADM

## 2025-07-02 RX ORDER — ONDANSETRON 2 MG/ML
4 INJECTION INTRAMUSCULAR; INTRAVENOUS EVERY 6 HOURS PRN
Status: DISCONTINUED | OUTPATIENT
Start: 2025-07-02 | End: 2025-07-05 | Stop reason: HOSPADM

## 2025-07-02 RX ADMIN — SODIUM BICARBONATE 650 MG TABLET 1300 MG: at 12:09

## 2025-07-02 RX ADMIN — CEFTRIAXONE 1000 MG: 10 INJECTION, POWDER, FOR SOLUTION INTRAVENOUS at 13:20

## 2025-07-02 RX ADMIN — HYDROMORPHONE HYDROCHLORIDE 0.2 MG: 0.2 INJECTION, SOLUTION INTRAMUSCULAR; INTRAVENOUS; SUBCUTANEOUS at 20:40

## 2025-07-02 RX ADMIN — SODIUM BICARBONATE 25 MEQ: 84 INJECTION INTRAVENOUS at 02:49

## 2025-07-02 RX ADMIN — INSULIN LISPRO 1 UNITS: 100 INJECTION, SOLUTION INTRAVENOUS; SUBCUTANEOUS at 22:45

## 2025-07-02 RX ADMIN — PANCRELIPASE 24000 UNITS: 120000; 24000; 76000 CAPSULE, DELAYED RELEASE PELLETS ORAL at 08:07

## 2025-07-02 RX ADMIN — INSULIN LISPRO 2 UNITS: 100 INJECTION, SOLUTION INTRAVENOUS; SUBCUTANEOUS at 08:07

## 2025-07-02 RX ADMIN — SODIUM ZIRCONIUM CYCLOSILICATE 10 G: 10 POWDER, FOR SUSPENSION ORAL at 13:20

## 2025-07-02 RX ADMIN — BUMETANIDE 0.5 MG: 0.25 INJECTION INTRAMUSCULAR; INTRAVENOUS at 02:49

## 2025-07-02 RX ADMIN — SODIUM POLYSTYRENE SULFONATE 30 G: 1 POWDER ORAL; RECTAL at 02:50

## 2025-07-02 RX ADMIN — FOLIC ACID 1 MG: 1 TABLET ORAL at 08:07

## 2025-07-02 RX ADMIN — THIAMINE HCL TAB 100 MG 50 MG: 100 TAB at 08:07

## 2025-07-02 RX ADMIN — PANCRELIPASE 24000 UNITS: 120000; 24000; 76000 CAPSULE, DELAYED RELEASE PELLETS ORAL at 12:09

## 2025-07-02 RX ADMIN — INSULIN LISPRO 2 UNITS: 100 INJECTION, SOLUTION INTRAVENOUS; SUBCUTANEOUS at 00:48

## 2025-07-02 RX ADMIN — SODIUM BICARBONATE 650 MG TABLET 1300 MG: at 16:54

## 2025-07-02 RX ADMIN — PANCRELIPASE 24000 UNITS: 120000; 24000; 76000 CAPSULE, DELAYED RELEASE PELLETS ORAL at 16:54

## 2025-07-02 RX ADMIN — SODIUM BICARBONATE 650 MG TABLET 1300 MG: at 08:07

## 2025-07-02 RX ADMIN — ALBUMIN (HUMAN) 12.5 G: 0.25 INJECTION, SOLUTION INTRAVENOUS at 20:51

## 2025-07-02 RX ADMIN — INSULIN LISPRO 1 UNITS: 100 INJECTION, SOLUTION INTRAVENOUS; SUBCUTANEOUS at 12:09

## 2025-07-02 RX ADMIN — ALBUMIN (HUMAN) 12.5 G: 0.25 INJECTION, SOLUTION INTRAVENOUS at 14:44

## 2025-07-02 NOTE — QUICK NOTE
Spoke with his brother at bedside. His brother is very concerned that he has not been eating. Discussed at length about his liver failure. Discussed that unsure if he would be a liver candidate due to recent events of leaving [facility] and concern about compliance. Pts brother did verify that Wes has not been drinking alcohol for some time. His brother will be available all day tomorrow and would like to have a call from gi about Me[place] over all prognosis. There is also concern that he may have dysphagia. Will consult speech therapy

## 2025-07-02 NOTE — ASSESSMENT & PLAN NOTE
Avoid nephrotoxins and hypotension  Nephrology consulted  Restart bicarb tablets 3 times daily    Lab Results   Component Value Date    EGFR 16 07/01/2025    EGFR 18 06/20/2025    EGFR 20 06/19/2025    CREATININE 3.91 (H) 07/01/2025    CREATININE 3.47 (H) 06/20/2025    CREATININE 3.27 (H) 06/19/2025

## 2025-07-02 NOTE — ASSESSMENT & PLAN NOTE
Wes Araujo is a 55-year-old male with a past medical history of hypertension, diabetes mellitus type 2, chronic pancreatitis secondary to chronic alcohol use and recent admission for alc hep with likely ZENAIDA on CKD who presented to [facility] on 7/1 for concern of recurrent abdominal distension and inability to eat due to distention.  Patient recently admitted to [facility] (6/12 to 6/20) for the same and ultimately left AMA.     GI consulted, appreciate the assistance  Last paracentesis 6/18, 5700 mL of ascites removed  Will likely need paracentesis in the morning  CT abdomen/pelvis showing: Sequela of chronic pancreatitis again seen. Mild wall thickening of multiple abdominal/pelvic small bowel loops as well as portions of the colon likely reactive in the setting of ascites. Can not exclude possibility of mild diffuse enterocolitis. No evidence of bowel obstruction, appendicitis, or free air. Large volume of abdominal/pelvic ascites again noted, similar compared to the prior exam.    Lab Results   Component Value Date    ALT 33 07/01/2025    AST 60 (H) 07/01/2025    GGT 1,128 (H) 05/24/2025    ALKPHOS 275 (H) 07/01/2025    TBILI 24.76 (H) 07/01/2025          Madmiguel's Discriminant Function - Control Prothrombin 13: 41.32 at 7/1/2025  8:26 PM  Calculated from:  Total Bilirubin: 24.76 mg/dL at 7/1/2025  8:26 PM  Prothrombin Time: 16.6 seconds at 7/1/2025  8:26 PM  MDF = (4.6 * (PT - Control PT)) + Bilirubin     MELD 3.0: 36 at 7/1/2025  8:26 PM  MELD-Na: 36 at 7/1/2025  8:26 PM  Calculated from:  Serum Creatinine: 3.91 mg/dL (Using max of 3 mg/dL) at 7/1/2025  8:26 PM  Serum Sodium: 130 mmol/L at 7/1/2025  8:26 PM  Total Bilirubin: 24.76 mg/dL at 7/1/2025  8:26 PM  Serum Albumin: 4.1 g/dL (Using max of 3.5 g/dL) at 7/1/2025  8:26 PM  INR(ratio): 1.33 at 7/1/2025  8:26 PM  Age at listing (hypothetical): 55 years  Sex: Male at 7/1/2025  8:26 PM

## 2025-07-02 NOTE — ASSESSMENT & PLAN NOTE
Patient reports that he is not currently drinking and has not drank since prior to his previous admission  Nonetheless, monitor for signs of withdrawal  Continue thiamine/folate supplementation

## 2025-07-02 NOTE — ASSESSMENT & PLAN NOTE
CT with sequela of chronic pancreatitis and parenchymal atrophy, mild main ductal dilation also noted  On Creon per primary team

## 2025-07-02 NOTE — ASSESSMENT & PLAN NOTE
Blood pressure currently 173/93  Restart Coreg, hydralazine and amlodipine at previous doses  Routine vital signs

## 2025-07-02 NOTE — ASSESSMENT & PLAN NOTE
55-year-old male with a past medical history of hypertension, diabetes mellitus type 2, chronic pancreatitis secondary to chronic alcohol use and recent admission for alc hep with likely ZENAIDA on CKD (creatinine 3.01 at discharge during previous hospitalization, previously 0.8-1 in 2024 but more recently 1.7-2) who presented to [facility] on 6/12 for concern of recurrent abdominal distension with associated pain in the setting of constipation.   Labs on presentation significant for continued improvement in AST/ALT/ALP since last admission, however, bilirubin continues to increase.  INR 1.18 on presentation (peaked during prior admission at 5.53 with dramatic improvement after Vitamin K). Kidney function has been progressively worsening since last hospitalization with new peak Cr 3.93 with severe hyperkalemia.     Patient previously underwent serologic liver workup negative for alternative cause of liver injury with elevations thought to be 2/2 alc hep in setting of some degree of underlying fibrosis.  Patient was treated with conservative management without steroids due to low Maddrey's score.  Patient was recommended to follow-up with gastroenterology but now presenting with recurrent abdominal distention/pain. Last hospitalization 6/12-6/20 further complicated by continued abdominal pain likely in the setting of constipation as well as worsening ZENAIDA with concern for HRS now on octreotide and albumin.    Maddrey's Discriminant Function - Control Prothrombin 13: 40.67 at 7/2/2025 12:28 AM  Calculated from:  Total Bilirubin: 24.11 mg/dL at 7/2/2025 12:28 AM  Prothrombin Time: 16.6 seconds at 7/1/2025  8:26 PM  MDF = (4.6 * (PT - Control PT)) + Bilirubin     MELD 3.0: 36 at 7/2/2025  3:01 AM  MELD-Na: 35 at 7/2/2025  3:01 AM  Calculated from:  Serum Creatinine: 3.71 mg/dL (Using max of 3 mg/dL) at 7/2/2025  3:01 AM  Serum Sodium: 133 mmol/L at 7/2/2025  3:01 AM  Total Bilirubin: 24.11 mg/dL at 7/2/2025 12:28 AM  Serum Albumin:  4.1 g/dL (Using max of 3.5 g/dL) at 7/2/2025 12:28 AM  INR(ratio): 1.33 at 7/1/2025  8:26 PM  Age at listing (hypothetical): 55 years  Sex: Male at 7/2/2025  3:01 AM    # Elevated Liver Chemistries - Alcohol hepatitis:   Monitor and trend LFTs daily along with INR  Avoid hepatotoxic medications, strongly encourage complete alcohol cessation  Consider liver biopsy outpatient given significant alkaline phosphatase elevation prior to admission  Additional pain and symptom management per primary team     # Ascites with ZENAIDA/CKD:   Repeat IR paracentesis pending - follow up fluid studies  Most recent paracentesis on 6/18 with removal of 5700 cc without evidence of SBP  Nephrology following - appreciate recommendations  Cr worsening - consider addition of albumin, previously no improvement with HRS treatment  Discussed with hepatology and will review with transplant center given progressive worsening  Monitor and trend serum creatinine and electrolytes daily, monitor urine output     # History of Coffee Ground Emesis and Melena:  Status post EGD 6/13/2025 with evidence of grade D esophagitis; history of gastric dieulafoy lesion as well 2/2025  Continue on Protonix 40 mg twice daily by mouth  Monitor and trend hemoglobin, transfuse for goal greater than 7  Alert GI team of any concern for recurrent GI bleeding     # Transplant  Patient with history of alcoholic hepatitis and now concern for HRS which may potentiate need for transplant.   Peth from 6/18 negative - patient reports abstinence since first admission with concern for alc hep  Has adequate family social support  Ambulated with rolling walked  No previous DUIs or legal trouble associated with drinking.  No previous stays in alcohol rehabilitation  He has multiple hospitalizations each year in the setting of alcohol use with encephalopathy, hypoglycemia as well as more recently alcoholic hepatitis.  Unfortunately, he has poor insight into how severe his condition is  and frequently becomes hyperfocused on certain unrelated matters.  Case previously reviewed with Dr. Yola Paige, hepatology, who stated although HRS less likely with elevated urine sodium, it would be reasonable to reach out to [facility] to discuss for potential transfer for transplant evaluation, but only if patient understanding of severity of condition and agreeable to transfer.  Discussed with patient at bedside today who is agreeable to transfer and transplant evaluation. Attempted to reach patient's family via telephone but unable to reach  Have reached out to [facility] transplant team to discuss case and awaiting reply

## 2025-07-02 NOTE — ASSESSMENT & PLAN NOTE
Lab Results   Component Value Date    HGBA1C 9.6 (H) 05/26/2025       Recent Labs     07/01/25  2316 07/02/25  0035 07/02/25  0726   POCGLU 232* 231* 210*       Blood Sugar Average: Last 72 hrs:  (P) [id number]

## 2025-07-02 NOTE — ASSESSMENT & PLAN NOTE
Low serum bicarb:   Bicarb 16  On bicarb tabs 1300 mg 3 times daily  Hyponatremia:  133 however corrects to 135 when accounting for hyperglycemia  Monitor

## 2025-07-02 NOTE — ASSESSMENT & PLAN NOTE
Lab Results   Component Value Date    EGFR 17 07/02/2025    EGFR 16 07/02/2025    EGFR 16 07/01/2025    CREATININE 3.71 (H) 07/02/2025    CREATININE 3.93 (H) 07/02/2025    CREATININE 3.91 (H) 07/01/2025

## 2025-07-02 NOTE — PLAN OF CARE
Problem: PAIN - ADULT  Goal: Verbalizes/displays adequate comfort level or baseline comfort level  Description: Interventions:  - Encourage patient to monitor pain and request assistance  - Assess pain using appropriate pain scale  - Administer analgesics as ordered based on type and severity of pain and evaluate response  - Implement non-pharmacological measures as appropriate and evaluate response  - Consider cultural and social influences on pain and pain management  - Notify physician/advanced practitioner if interventions unsuccessful or patient reports new pain  - Educate patient/family on pain management process including their role and importance of  reporting pain   - Provide non-pharmacologic/complimentary pain relief interventions  Outcome: Progressing     Problem: INFECTION - ADULT  Goal: Absence or prevention of progression during hospitalization  Description: INTERVENTIONS:  - Assess and monitor for signs and symptoms of infection  - Monitor lab/diagnostic results  - Monitor all insertion sites, i.e. indwelling lines, tubes, and drains  - Monitor endotracheal if appropriate and nasal secretions for changes in amount and color  - [place] appropriate cooling/warming therapies per order  - Administer medications as ordered  - Instruct and encourage patient and family to use good hand hygiene technique  - Identify and instruct in appropriate isolation precautions for identified infection/condition  Outcome: Progressing     Problem: SAFETY ADULT  Goal: Patient will remain free of falls  Description: INTERVENTIONS:  - Educate patient/family on patient safety including physical limitations  - Instruct patient to call for assistance with activity   - Consider consulting OT/PT to assist with strengthening/mobility based on AM PAC & JH-HLM score  - Consult OT/PT to assist with strengthening/mobility   - Keep Call bell within reach  - Keep bed low and locked with side rails adjusted as appropriate  - Keep  care items and personal belongings within reach  - Initiate and maintain comfort rounds  - Make Fall Risk Sign visible to staff  - Offer Toileting every 2 Hours, in advance of need  - Initiate/Maintain bed alarm  - Obtain necessary fall risk management equipment: alarms  - Apply yellow socks and bracelet for high fall risk patients  - Consider moving patient to room near nurses station  Outcome: Progressing  Goal: Maintain or return to baseline ADL function  Description: INTERVENTIONS:  -  Assess patient's ability to carry out ADLs; assess patient's baseline for ADL function and identify physical deficits which impact ability to perform ADLs (bathing, care of mouth/teeth, toileting, grooming, dressing, etc.)  - Assess/evaluate cause of self-care deficits   - Assess range of motion  - Assess patient's mobility; develop plan if impaired  - Assess patient's need for assistive devices and provide as appropriate  - Encourage maximum independence but intervene and supervise when necessary  - Involve family in performance of ADLs  - Assess for home care needs following discharge   - Consider OT consult to assist with ADL evaluation and planning for discharge  - Provide patient education as appropriate  - Monitor functional capacity and physical performance, use of AM PAC & JH-HLM   - Monitor gait, balance and fatigue with ambulation    Outcome: Progressing  Goal: Maintains/Returns to pre admission functional level  Description: INTERVENTIONS:  - Perform AM-PAC 6 Click Basic Mobility/ Daily Activity assessment daily.  - Set and communicate daily mobility goal to care team and patient/family/caregiver.   - Collaborate with rehabilitation services on mobility goals if consulted  - Perform Range of Motion 2 times a day.  - Reposition patient every 2 hours.  - Dangle patient 2 times a day  - Stand patient 2 times a day  - Ambulate patient 3 times a day  - Out of bed to chair 3 times a day   - Out of bed for meals 3 times a  day  - Out of bed for toileting  - Record patient progress and toleration of activity level   Outcome: Progressing     Problem: DISCHARGE PLANNING  Goal: Discharge to home or other facility with appropriate resources  Description: INTERVENTIONS:  - Identify barriers to discharge w/patient and caregiver  - Arrange for needed discharge resources and transportation as appropriate  - Identify discharge learning needs (meds, wound care, etc.)  - Arrange for interpretive services to assist at discharge as needed  - Refer to Case Management Department for coordinating discharge planning if the patient needs post-hospital services based on physician/advanced practitioner order or complex needs related to functional status, cognitive ability, or social support system  Outcome: Progressing     Problem: Knowledge Deficit  Goal: Patient/family/caregiver demonstrates understanding of disease process, treatment plan, medications, and discharge instructions  Description: Complete learning assessment and assess knowledge base.  Interventions:  - Provide teaching at level of understanding  - Provide teaching via preferred learning methods  Outcome: Progressing     Problem: CARDIOVASCULAR - ADULT  Goal: Maintains optimal cardiac output and hemodynamic stability  Description: INTERVENTIONS:  - Monitor I/O, vital signs and rhythm  - Monitor for S/S and trends of decreased cardiac output  - Administer and titrate ordered vasoactive medications to optimize hemodynamic stability  - Assess quality of pulses, skin color and temperature  - Assess for signs of decreased coronary artery perfusion  - Instruct patient to report change in severity of symptoms  Outcome: Progressing  Goal: Absence of cardiac dysrhythmias or at baseline rhythm  Description: INTERVENTIONS:  - Continuous cardiac monitoring, vital signs, obtain 12 lead EKG if ordered  - Administer antiarrhythmic and heart rate control medications as ordered  - Monitor electrolytes and  administer replacement therapy as ordered  Outcome: Progressing     Problem: METABOLIC, FLUID AND ELECTROLYTES - ADULT  Goal: Electrolytes maintained within normal limits  Description: INTERVENTIONS:  - Monitor labs and assess patient for signs and symptoms of electrolyte imbalances  - Administer electrolyte replacement as ordered  - Monitor response to electrolyte replacements, including repeat lab results as appropriate  - Instruct patient on fluid and nutrition as appropriate  Outcome: Progressing  Goal: Fluid balance maintained  Description: INTERVENTIONS:  - Monitor labs   - Monitor I/O and WT  - Instruct patient on fluid and nutrition as appropriate  - Assess for signs & symptoms of volume excess or deficit  Outcome: Progressing  Goal: Glucose maintained within target range  Description: INTERVENTIONS:  - Monitor Blood Glucose as ordered  - Assess for signs and symptoms of hyperglycemia and hypoglycemia  - Administer ordered medications to maintain glucose within target range  - Assess nutritional intake and initiate nutrition service referral as needed  Outcome: Progressing     Problem: GASTROINTESTINAL - ADULT  Goal: Maintains adequate nutritional intake  Description: INTERVENTIONS:  - Monitor percentage of each meal consumed  - Identify factors contributing to decreased intake, treat as appropriate  - Assist with meals as needed  - Monitor I&O, weight, and lab values if indicated  - Obtain nutrition services referral as needed  Outcome: Progressing

## 2025-07-02 NOTE — PLAN OF CARE
Problem: PAIN - ADULT  Goal: Verbalizes/displays adequate comfort level or baseline comfort level  Description: Interventions:  - Encourage patient to monitor pain and request assistance  - Assess pain using appropriate pain scale  - Administer analgesics as ordered based on type and severity of pain and evaluate response  - Implement non-pharmacological measures as appropriate and evaluate response  - Consider cultural and social influences on pain and pain management  - Notify physician/advanced practitioner if interventions unsuccessful or patient reports new pain  - Educate patient/family on pain management process including their role and importance of  reporting pain   - Provide non-pharmacologic/complimentary pain relief interventions  Outcome: Progressing     Problem: INFECTION - ADULT  Goal: Absence or prevention of progression during hospitalization  Description: INTERVENTIONS:  - Assess and monitor for signs and symptoms of infection  - Monitor lab/diagnostic results  - Monitor all insertion sites, i.e. indwelling lines, tubes, and drains  - Monitor endotracheal if appropriate and nasal secretions for changes in amount and color  - [place] appropriate cooling/warming therapies per order  - Administer medications as ordered  - Instruct and encourage patient and family to use good hand hygiene technique  - Identify and instruct in appropriate isolation precautions for identified infection/condition  Outcome: Progressing     Problem: SAFETY ADULT  Goal: Patient will remain free of falls  Description: INTERVENTIONS:  - Educate patient/family on patient safety including physical limitations  - Instruct patient to call for assistance with activity   - Consider consulting OT/PT to assist with strengthening/mobility based on AM PAC & JH-HLM score  - Consult OT/PT to assist with strengthening/mobility   - Keep Call bell within reach  - Keep bed low and locked with side rails adjusted as appropriate  - Keep  care items and personal belongings within reach  - Initiate and maintain comfort rounds  - Make Fall Risk Sign visible to staff  - Offer Toileting every 2 Hours, in advance of need  - Initiate/Maintain bed alarm  - Obtain necessary fall risk management equipment: alarms  - Apply yellow socks and bracelet for high fall risk patients  - Consider moving patient to room near nurses station  Outcome: Progressing  Goal: Maintain or return to baseline ADL function  Description: INTERVENTIONS:  -  Assess patient's ability to carry out ADLs; assess patient's baseline for ADL function and identify physical deficits which impact ability to perform ADLs (bathing, care of mouth/teeth, toileting, grooming, dressing, etc.)  - Assess/evaluate cause of self-care deficits   - Assess range of motion  - Assess patient's mobility; develop plan if impaired  - Assess patient's need for assistive devices and provide as appropriate  - Encourage maximum independence but intervene and supervise when necessary  - Involve family in performance of ADLs  - Assess for home care needs following discharge   - Consider OT consult to assist with ADL evaluation and planning for discharge  - Provide patient education as appropriate  - Monitor functional capacity and physical performance, use of AM PAC & JH-HLM   - Monitor gait, balance and fatigue with ambulation    Outcome: Progressing  Goal: Maintains/Returns to pre admission functional level  Description: INTERVENTIONS:  - Perform AM-PAC 6 Click Basic Mobility/ Daily Activity assessment daily.  - Set and communicate daily mobility goal to care team and patient/family/caregiver.   - Collaborate with rehabilitation services on mobility goals if consulted  - Perform Range of Motion 2 times a day.  - Reposition patient every 2 hours.  - Dangle patient 2 times a day  - Stand patient 2 times a day  - Ambulate patient 3 times a day  - Out of bed to chair 3 times a day   - Out of bed for meals 3 times a  day  - Out of bed for toileting  - Record patient progress and toleration of activity level   Outcome: Progressing     Problem: DISCHARGE PLANNING  Goal: Discharge to home or other facility with appropriate resources  Description: INTERVENTIONS:  - Identify barriers to discharge w/patient and caregiver  - Arrange for needed discharge resources and transportation as appropriate  - Identify discharge learning needs (meds, wound care, etc.)  - Arrange for interpretive services to assist at discharge as needed  - Refer to Case Management Department for coordinating discharge planning if the patient needs post-hospital services based on physician/advanced practitioner order or complex needs related to functional status, cognitive ability, or social support system  Outcome: Progressing     Problem: Knowledge Deficit  Goal: Patient/family/caregiver demonstrates understanding of disease process, treatment plan, medications, and discharge instructions  Description: Complete learning assessment and assess knowledge base.  Interventions:  - Provide teaching at level of understanding  - Provide teaching via preferred learning methods  Outcome: Progressing     Problem: CARDIOVASCULAR - ADULT  Goal: Maintains optimal cardiac output and hemodynamic stability  Description: INTERVENTIONS:  - Monitor I/O, vital signs and rhythm  - Monitor for S/S and trends of decreased cardiac output  - Administer and titrate ordered vasoactive medications to optimize hemodynamic stability  - Assess quality of pulses, skin color and temperature  - Assess for signs of decreased coronary artery perfusion  - Instruct patient to report change in severity of symptoms  Outcome: Progressing  Goal: Absence of cardiac dysrhythmias or at baseline rhythm  Description: INTERVENTIONS:  - Continuous cardiac monitoring, vital signs, obtain 12 lead EKG if ordered  - Administer antiarrhythmic and heart rate control medications as ordered  - Monitor electrolytes and  administer replacement therapy as ordered  Outcome: Progressing     Problem: METABOLIC, FLUID AND ELECTROLYTES - ADULT  Goal: Electrolytes maintained within normal limits  Description: INTERVENTIONS:  - Monitor labs and assess patient for signs and symptoms of electrolyte imbalances  - Administer electrolyte replacement as ordered  - Monitor response to electrolyte replacements, including repeat lab results as appropriate  - Instruct patient on fluid and nutrition as appropriate  Outcome: Progressing  Goal: Fluid balance maintained  Description: INTERVENTIONS:  - Monitor labs   - Monitor I/O and WT  - Instruct patient on fluid and nutrition as appropriate  - Assess for signs & symptoms of volume excess or deficit  Outcome: Progressing  Goal: Glucose maintained within target range  Description: INTERVENTIONS:  - Monitor Blood Glucose as ordered  - Assess for signs and symptoms of hyperglycemia and hypoglycemia  - Administer ordered medications to maintain glucose within target range  - Assess nutritional intake and initiate nutrition service referral as needed  Outcome: Progressing     Problem: GASTROINTESTINAL - ADULT  Goal: Maintains adequate nutritional intake  Description: INTERVENTIONS:  - Monitor percentage of each meal consumed  - Identify factors contributing to decreased intake, treat as appropriate  - Assist with meals as needed  - Monitor I&O, weight, and lab values if indicated  - Obtain nutrition services referral as needed  Outcome: Progressing

## 2025-07-02 NOTE — ASSESSMENT & PLAN NOTE
CT findings with ascites as above  Last paracentesis 6/18 with 5700 ml removed   Received 0.5 mg IV Bumex  GI consulted

## 2025-07-02 NOTE — H&P
H&P - Hospitalist   Name: Wes Araujo 55 y.o. male I MRN: [id number]  Unit/Bed#: ED-27 I Date of Admission: 7/1/2025   Date of Service: 7/2/2025 I Hospital Day: 0     Assessment & Plan  Primary hypertension  Blood pressure currently 173/93  Restart Coreg, hydralazine and amlodipine at previous doses  Routine vital signs  Type 2 diabetes mellitus with hyperglycemia, with long-term current use of insulin (HCC)  Since patient left AMA recently he has not taken any of his insulin or oral medications  Continue 70/30 insulin 8 units twice daily  Sliding scale insulin  ACHS glucose checks  Diabetic diet  Hypoglycemia protocol    Lab Results   Component Value Date    HGBA1C 9.6 (H) 05/26/2025     Alcoholic hepatitis with ascites  Wes Araujo is a 55-year-old male with a past medical history of hypertension, diabetes mellitus type 2, chronic pancreatitis secondary to chronic alcohol use and recent admission for alc hep with likely ZENAIDA on CKD who presented to [facility] on 7/1 for concern of recurrent abdominal distension and inability to eat due to distention.  Patient recently admitted to [facility] (6/12 to 6/20) for the same and ultimately left AMA.     GI consulted, appreciate the assistance  Last paracentesis 6/18, 5700 mL of ascites removed  Will likely need paracentesis in the morning  CT abdomen/pelvis showing: Sequela of chronic pancreatitis again seen. Mild wall thickening of multiple abdominal/pelvic small bowel loops as well as portions of the colon likely reactive in the setting of ascites. Can not exclude possibility of mild diffuse enterocolitis. No evidence of bowel obstruction, appendicitis, or free air. Large volume of abdominal/pelvic ascites again noted, similar compared to the prior exam.    Lab Results   Component Value Date    ALT 33 07/01/2025    AST 60 (H) 07/01/2025    GGT 1,128 (H) 05/24/2025    ALKPHOS 275 (H) 07/01/2025    TBILI 24.76 (H) 07/01/2025          Pamela's Discriminant  Function - Control Prothrombin 13: 41.32 at 7/1/2025  8:26 PM  Calculated from:  Total Bilirubin: 24.76 mg/dL at 7/1/2025  8:26 PM  Prothrombin Time: 16.6 seconds at 7/1/2025  8:26 PM  MDF = (4.6 * (PT - Control PT)) + Bilirubin     MELD 3.0: 36 at 7/1/2025  8:26 PM  MELD-Na: 36 at 7/1/2025  8:26 PM  Calculated from:  Serum Creatinine: 3.91 mg/dL (Using max of 3 mg/dL) at 7/1/2025  8:26 PM  Serum Sodium: 130 mmol/L at 7/1/2025  8:26 PM  Total Bilirubin: 24.76 mg/dL at 7/1/2025  8:26 PM  Serum Albumin: 4.1 g/dL (Using max of 3.5 g/dL) at 7/1/2025  8:26 PM  INR(ratio): 1.33 at 7/1/2025  8:26 PM  Age at listing (hypothetical): 55 years  Sex: Male at 7/1/2025  8:26 PM    Paroxysmal A-fib (HCC)  History of paroxysmal atrial fibrillation  Restart Coreg 6.25 mg twice daily  Not on anticoagulation  Acute renal failure superimposed on stage 3 chronic kidney disease (HCC)  Avoid nephrotoxins and hypotension  Nephrology consulted  Restart bicarb tablets 3 times daily    Lab Results   Component Value Date    EGFR 16 07/01/2025    EGFR 18 06/20/2025    EGFR 20 06/19/2025    CREATININE 3.91 (H) 07/01/2025    CREATININE 3.47 (H) 06/20/2025    CREATININE 3.27 (H) 06/19/2025     Alcohol-induced chronic pancreatitis (HCC)  Noted again on CAT scan  Restart Creon 24,000 units 3 times a day with meals    Hyperkalemia  Potassium in the emergency room 6.9-->7.0  Hemolyzed, yet treated with albuterol, calcium gluconate, and insulin/dextrose  [place] to get an accurate potassium level  Alcohol abuse  Patient reports that he is not currently drinking and has not drank since prior to his previous admission  Nonetheless, monitor for signs of withdrawal  Continue thiamine/folate supplementation      VTE Pharmacologic Prophylaxis: VTE Score: 1 Low Risk (Score 0-2) - Encourage Ambulation.  Code Status: Level 1 - Full Code   Discussion with family: Patient, at bedside    Anticipated Length of Stay: Patient will be admitted on an inpatient basis  with an anticipated length of stay of greater than 2 midnights secondary to new chronic kidney disease, liver failure with ascites and metabolic derangements.    History of Present Illness   Chief Complaint: Abdominal distention    Wes Araujo is a 55-year-old male with a past medical history of hypertension, diabetes mellitus type 2, chronic pancreatitis secondary to chronic alcohol use and recent admission for alc hep with likely ZENAIDA on CKD who presented to [facility] on 7/1 for concern of recurrent abdominal distension and inability to eat due to distention.  Patient recently admitted to [facility] (6/12 to 6/20) for the same and ultimately left AMA.  Currently his vital signs are stable however he has significant ascites and distention so will be admitted for further treatment.    Review of Systems   Constitutional:  Positive for appetite change and fatigue. Negative for chills and fever.   HENT:  Negative for ear pain and sore throat.    Eyes:  Negative for pain and visual disturbance.   Respiratory:  Negative for cough and shortness of breath.    Cardiovascular:  Negative for chest pain and palpitations.   Gastrointestinal:  Positive for abdominal distention. Negative for abdominal pain and vomiting.   Genitourinary:  Negative for dysuria and hematuria.   Musculoskeletal:  Negative for arthralgias and back pain.   Skin:  Negative for color change and rash.   Neurological:  Positive for weakness. Negative for seizures and syncope.   All other systems reviewed and are negative.      Historical Information   Past Medical History[1]  Past Surgical History[2]  Social History[3]  E-Cigarette/Vaping    E-Cigarette Use Unknown If Ever Used      E-Cigarette/Vaping Substances    Nicotine No     THC No     CBD No     Flavoring No     Other No     Unknown No      Family History[4]  Social History:  Marital Status: Single   Occupation: Not addressed  Patient Pre-hospital Living Situation: Home with family  Patient  "Pre-hospital Level of Mobility: walks with walker  Patient Pre-hospital Diet Restrictions: None    Meds/Allergies   I have reviewed home medications using recent Epic encounter.  Prior to Admission medications    Medication Sig Start Date End Date Taking? Authorizing Provider   Alcohol Swabs 70 % PADS May substitute brand based on insurance coverage. Check glucose TID. 3/18/25  Yes Johny Sung DO   Blood Glucose Monitoring Suppl (OneTouch Verio Reflect) w/Device KIT May substitute brand based on insurance coverage. Check glucose TID. 3/18/25  Yes Johny Sung, DO   carvedilol (COREG) 6.25 mg tablet Take 1 tablet (6.25 mg total) by mouth 2 (two) times a day with meals 6/6/25  Yes Mojgan Young MD   folic acid ( Folic Acid) 1 mg tablet Take 1 tablet (1 mg total) by mouth daily 6/6/25 7/6/25 Yes Mojgan Young MD   glucose blood (OneTouch Verio) test strip May substitute brand based on insurance coverage. Check glucose TID. 3/18/25  Yes Johny Sung, DO   insulin NPH-insulin regular (HumuLIN 70/30) 100 units/mL subcutaneous injection Inject 8 Units under the skin 2 (two) times a day before meals Do not give yourself insulin if you are not going to eat. 6/6/25  Yes Mojgan Young MD   Insulin Syringe-Needle U-100 (BD Insulin Syringe U/F) 31G X 5/16\" 0.3 ML MISC For use with insulin. Pharmacy may dispense brand covered by insurance. 3/18/25  Yes Johny Sung, DO   OneTouch Delica Lancets 33G MISC May substitute brand based on insurance coverage. Check glucose TID. 3/18/25  Yes Johny Sung, DO   pancrelipase, Lip-Prot-Amyl, (CREON) 24,000 units Take 24,000 units of lipase by mouth 3 (three) times a day with meals 10/26/24  Yes Fannie Hernandez MD   sodium bicarbonate 650 mg tablet Take 2 tablets (1,300 mg total) by mouth 3 (three) times daily after meals 6/6/25  Yes Mojgan Young MD   thiamine 50 MG tablet Take 1 tablet (50 mg total) by mouth daily 10/26/24  " Yes Fannie Cabral Radha Hernandez MD   amLODIPine (NORVASC) 5 mg tablet Take 1 tablet (5 mg total) by mouth 2 (two) times a day 3/18/25 5/17/25  Johny Sung, DO   hydrALAZINE (APRESOLINE) 25 mg tablet Take 1 tablet (25 mg total) by mouth every 8 (eight) hours 3/18/25 5/17/25  Johny Sung, DO   Multiple Vitamin (multivitamin) tablet Take 1 tablet by mouth daily 1/27/24 2/26/24  Julio Mcneil MD     No Known Allergies    Objective :  Temp:  [97.7 °F (36.5 °C)] 97.7 °F (36.5 °C)  HR:  [72-79] 79  BP: (142-173)/(79-93) 142/81  Resp:  [16-21] 21  SpO2:  [99 %-100 %] 100 %  O2 Device: None (Room air)    Physical Exam  Vitals and nursing note reviewed.   Constitutional:       General: He is not in acute distress.     Appearance: He is well-developed.   HENT:      Head: Normocephalic and atraumatic.     Eyes:      Comments: Conjunctival icterus     Cardiovascular:      Rate and Rhythm: Normal rate and regular rhythm.      Heart sounds: No murmur heard.  Pulmonary:      Effort: Pulmonary effort is normal. No respiratory distress.      Breath sounds: Normal breath sounds.   Abdominal:      General: There is distension.      Palpations: Abdomen is soft.      Tenderness: There is no abdominal tenderness.     Musculoskeletal:         General: No swelling.      Cervical back: Neck supple.     Skin:     General: Skin is warm and dry.      Capillary Refill: Capillary refill takes less than 2 seconds.      Coloration: Skin is jaundiced.     Neurological:      Mental Status: He is alert and oriented to person, place, and time.     Psychiatric:         Mood and Affect: Mood normal.          Lines/Drains:            Lab Results: I have reviewed the following results:  Results from last 7 days   Lab Units 07/02/25  0028   WBC Thousand/uL 17.38*   HEMOGLOBIN g/dL 10.9*   HEMATOCRIT % 32.2*   PLATELETS Thousands/uL 276   SEGS PCT % 79*   LYMPHO PCT % 11*   MONO PCT % 8   EOS PCT % 1     Results from last 7 days   Lab  Units 07/01/25 2236 07/01/25 2026   SODIUM mmol/L  --  130*   POTASSIUM mmol/L 7.0* 6.9*   CHLORIDE mmol/L  --  103   CO2 mmol/L  --  17*   BUN mg/dL  --  55*   CREATININE mg/dL  --  3.91*   ANION GAP mmol/L  --  10   CALCIUM mg/dL  --  10.1   ALBUMIN g/dL  --  4.1   TOTAL BILIRUBIN mg/dL  --  24.76*   ALK PHOS U/L  --  275*   ALT U/L  --  33   AST U/L  --  60*   GLUCOSE RANDOM mg/dL  --  245*     Results from last 7 days   Lab Units 07/01/25 2026   INR  1.33*     Results from last 7 days   Lab Units 07/02/25  0035 07/01/25  2316   POC GLUCOSE mg/dl 231* 232*     Lab Results   Component Value Date    HGBA1C 9.6 (H) 05/26/2025    HGBA1C 9.8 (H) 02/05/2025    HGBA1C 11.2 (H) 10/23/2024           Imaging Results Review: I reviewed radiology reports from this admission including: CT abdomen/pelvis.  Other Study Results Review: No additional pertinent studies reviewed.    ** Please Note: This note has been constructed using a voice recognition system. **         [1]   Past Medical History:  Diagnosis Date    Alcohol abuse     Chronic pancreatitis (HCC)     Diabetes mellitus (HCC)     Type 2    Non compliance w medication regimen     Pancreatitis     Paroxysmal A-fib (HCC)     Thrombocytopenia (HCC)    [2]   Past Surgical History:  Procedure Laterality Date    INCISION AND DRAINAGE OF WOUND N/A 8/16/2020    Procedure: INCISION AND DRAINAGE (I&D) HEAD/FACE;  Surgeon: Estrada Walton DMD;  Location: [facility];  Service: Maxillofacial    IR BIOPSY BONE MARROW  2/7/2019    IR PARACENTESIS  6/3/2025    IR PARACENTESIS  6/13/2025    IR PARACENTESIS  6/18/2025    REMOVAL OF IMPACTED TOOTH - COMPLETELY BONY N/A 8/16/2020    Procedure: EXTRACTION TEETH MULTIPLE #2, 3;  Surgeon: Estrada Walton DMD;  Location: [facility];  Service: Maxillofacial   [3]   Social History  Tobacco Use    Smoking status: Unknown     Passive exposure: Past   Vaping Use    Vaping status: Unknown   Substance and Sexual Activity    Alcohol use: Yes    Drug  use: Not Currently     Types: Cocaine   [4]   Family History  Problem Relation Name Age of Onset    Diabetes Mother      Hypertension Mother      Hyperlipidemia Father

## 2025-07-02 NOTE — ED PROVIDER NOTES
Time reflects when diagnosis was documented in both MDM as applicable and the Disposition within this note       Time User Action Codes Description Comment    7/2/2025 12:42 AM Irene Ruiz Add [K70.11] Ascites due to alcoholic hepatitis     7/2/2025 12:43 AM Irene Ruiz Add [N18.9] Chronic renal disease     7/2/2025 12:43 AM Irene Ruiz Add [K86.1] Chronic pancreatitis (HCC)     7/2/2025 12:44 AM Liu Vergara Add [N18.4] CKD (chronic kidney disease) stage 4, GFR 15-29 ml/min (HCC)           ED Disposition       None          Assessment & Plan       Medical Decision Making  55 year old male with past medical history of ascites due to alcoholic hepatitis, chronic kidney failure, chronic pancreatitis presenting for evaluation of abdominal distention, N/V, and loss appetite. Recently admitted for same and left AMA on 6/20. On exam patient is extremely jaundiced with abdominal distention and scleral icterus. Suspect worsening of alcoholic hepatitis. Plan for repeat labwork, anticipate eventual readmission.     Labs significant for leukocytosis of 18, Tbili 24, creatinine of 3.9, worsening than prior. Does not meet sepsis criteria. Potassium repeatedly elevated although suspect due to hemolyzed sample, no EKG changes. Will treat. Discussed case with internal medicine, will admit due to degree of ascites, likely requiring paracentesis.     Amount and/or Complexity of Data Reviewed  Labs: ordered.  Radiology: ordered.    Risk  OTC drugs.  Prescription drug management.             Medications   insulin lispro (HumALOG/ADMELOG) 100 units/mL subcutaneous injection 1-5 Units (2 Units Subcutaneous Given 7/2/25 0048)   aluminum-magnesium hydroxide-simethicone (MAALOX) oral suspension 30 mL (has no administration in time range)   acetaminophen (TYLENOL) tablet 650 mg (has no administration in time range)   ondansetron (ZOFRAN) injection 4 mg (has no administration in time range)   ondansetron (ZOFRAN) injection 4 mg (4  mg Intravenous Given 7/1/25 2026)   ondansetron (ZOFRAN) injection 4 mg (4 mg Intravenous Given 7/1/25 2147)   insulin regular (HumuLIN R,NovoLIN R) injection 6.45 Units (6.45 Units Intravenous Given 7/1/25 2328)   dextrose 50 % IV solution 25 mL (25 mL Intravenous Given 7/1/25 2325)   albuterol inhalation solution 10 mg (10 mg Nebulization Given 7/1/25 2323)   calcium gluconate 1 g in sodium chloride 0.9% 50 mL (premix) (0 g Intravenous Stopped 7/2/25 0002)   ondansetron (ZOFRAN) injection 4 mg (4 mg Intravenous Given 7/1/25 2339)   hydrALAZINE (APRESOLINE) injection 5 mg (5 mg Intravenous Given 7/1/25 2357)       ED Risk Strat Scores                    No data recorded        SBIRT 20yo+      Flowsheet Row Most Recent Value   Initial Alcohol Screen: US AUDIT-C     1. How often do you have a drink containing alcohol? 0 Filed at: 07/01/2025 1914   2. How many drinks containing alcohol do you have on a typical day you are drinking?  0 Filed at: 07/01/2025 1914   3a. Male UNDER 65: How often do you have five or more drinks on one occasion? 0 Filed at: 07/01/2025 1914   Audit-C Score 0 Filed at: 07/01/2025 1914   MUSA: How many times in the past year have you...    Used an illegal drug or used a prescription medication for non-medical reasons? Never Filed at: 07/01/2025 1914                            History of Present Illness       Chief Complaint   Patient presents with    Bloated     Patient reports increase stomach size due to chronic hx of liver conditions as well as decrease appetite.        Past Medical History[1]   Past Surgical History[2]   Family History[3]   Social History[4]   E-Cigarette/Vaping    E-Cigarette Use Unknown If Ever Used       E-Cigarette/Vaping Substances    Nicotine No     THC No     CBD No     Flavoring No     Other No     Unknown No       I have reviewed and agree with the history as documented.     55 year old male presenting for evaluation of increased abdominal distention, nausea, and  decreased appetite. Recently admitted for same and found to have alcoholic hepatitis and ascites, ultimately left AMA. States he would like to be re-hospitalized due to worsening distention and inability to eat. Admits to associated nausea and vomiting. Denies fevers, chills, chest pain, shortness of breath, urinary symptoms.           Review of Systems   Constitutional:  Positive for appetite change. Negative for chills and fever.   HENT:  Negative for ear pain and sore throat.    Eyes:  Negative for pain and visual disturbance.   Respiratory:  Negative for cough and shortness of breath.    Cardiovascular:  Negative for chest pain and palpitations.   Gastrointestinal:  Positive for abdominal distention, abdominal pain, nausea and vomiting.   Genitourinary:  Negative for dysuria and hematuria.   Musculoskeletal:  Negative for arthralgias and back pain.   Skin:  Negative for color change and rash.   Neurological:  Negative for seizures and syncope.   All other systems reviewed and are negative.          Objective       ED Triage Vitals   Temperature Pulse Blood Pressure Respirations SpO2 Patient Position - Orthostatic VS   07/01/25 2012 07/01/25 1913 07/01/25 1913 07/01/25 1913 07/01/25 1913 07/01/25 1913   97.7 °F (36.5 °C) 72 149/79 16 99 % Lying      Temp Source Heart Rate Source BP Location FiO2 (%) Pain Score    07/01/25 2012 07/01/25 1913 07/01/25 1913 -- 07/02/25 0208    Oral Monitor Right arm  No Pain      Vitals      Date and Time Temp Pulse SpO2 Resp BP Pain Score FACES Pain Rating User   07/02/25 0727 97.8 °F (36.6 °C) 75 98 % 20 154/77 -- -- CC   07/02/25 0334 97.7 °F (36.5 °C) 80 100 % 20 174/94 -- -- AR   07/02/25 0208 -- -- -- -- -- No Pain -- CR   07/02/25 0134 98 °F (36.7 °C) 81 100 % 21 174/86 -- -- AR   07/02/25 0030 -- 79 100 % 21 142/81 -- --    07/01/25 2345 -- 77 100 % 20 173/93 -- --    07/01/25 2335 -- 78 -- 17 173/93 -- -- AMAYA   07/01/25 2012 97.7 °F (36.5 °C) -- -- -- -- -- -- SUHAIL    07/01/25 1913 -- 72 99 % 16 149/79 -- -- JT            Physical Exam  Vitals and nursing note reviewed.   Constitutional:       General: He is not in acute distress.     Appearance: He is well-developed.   HENT:      Head: Normocephalic and atraumatic.     Eyes:      General: Scleral icterus present.       Cardiovascular:      Rate and Rhythm: Normal rate and regular rhythm.      Heart sounds: No murmur heard.  Pulmonary:      Effort: Pulmonary effort is normal. No respiratory distress.      Breath sounds: Normal breath sounds.   Abdominal:      General: There is distension.      Tenderness: There is no abdominal tenderness.     Musculoskeletal:         General: No swelling.      Cervical back: Neck supple.     Skin:     General: Skin is warm.      Capillary Refill: Capillary refill takes less than 2 seconds.      Coloration: Skin is jaundiced.     Neurological:      Mental Status: He is alert.     Psychiatric:         Mood and Affect: Mood normal.         Results Reviewed       Procedure Component Value Units Date/Time    Comprehensive metabolic panel [[id number]]  (Abnormal) Collected: 07/02/25 0028    Lab Status: Final result Specimen: Blood from Arm, Right Updated: 07/02/25 0120     Sodium 128 mmol/L      Potassium 8.7 mmol/L      Chloride 105 mmol/L      CO2 14 mmol/L      ANION GAP 9 mmol/L      BUN 51 mg/dL      Creatinine 3.93 mg/dL      Glucose 252 mg/dL      Calcium 9.8 mg/dL      AST 72 U/L      ALT 33 U/L      Alkaline Phosphatase 288 U/L      Total Protein 7.5 g/dL      Albumin 4.1 g/dL      Total Bilirubin 24.11 mg/dL      eGFR 16 ml/min/1.73sq m     Narrative:      National Kidney Disease Foundation guidelines for Chronic Kidney Disease (CKD):     Stage 1 with normal or high GFR (GFR > 90 mL/min/1.73 square meters)    Stage 2 Mild CKD (GFR = 60-89 mL/min/1.73 square meters)    Stage 3A Moderate CKD (GFR = 45-59 mL/min/1.73 square meters)    Stage 3B Moderate CKD (GFR = 30-44 mL/min/1.73 square  meters)    Stage 4 Severe CKD (GFR = 15-29 mL/min/1.73 square meters)    Stage 5 End Stage CKD (GFR <15 mL/min/1.73 square meters)  Note: GFR calculation is accurate only with a steady state creatinine    Magnesium [[id number]]  (Normal) Collected: 07/02/25 0028    Lab Status: Final result Specimen: Blood from Arm, Right Updated: 07/02/25 0118     Magnesium 2.7 mg/dL     Phosphorus [[id number]]  (Normal) Collected: 07/02/25 0028    Lab Status: Final result Specimen: Blood from Arm, Right Updated: 07/02/25 0118     Phosphorus 2.8 mg/dL     Fingerstick Glucose (POCT) [[id number]]  (Abnormal) Collected: 07/02/25 0035    Lab Status: Final result Specimen: Blood Updated: 07/02/25 0036     POC Glucose 231 mg/dl     CBC and differential [[id number]]  (Abnormal) Collected: 07/02/25 0028    Lab Status: Final result Specimen: Blood from Arm, Right Updated: 07/02/25 0032     WBC 17.38 Thousand/uL      RBC 3.41 Million/uL      Hemoglobin 10.9 g/dL      Hematocrit 32.2 %      MCV 94 fL      MCH 32.0 pg      MCHC 33.9 g/dL      RDW 14.9 %      MPV 11.8 fL      Platelets 276 Thousands/uL      nRBC 0 /100 WBCs      Segmented % 79 %      Immature Grans % 1 %      Lymphocytes % 11 %      Monocytes % 8 %      Eosinophils Relative 1 %      Basophils Relative 0 %      Absolute Neutrophils 13.72 Thousands/µL      Absolute Immature Grans 0.13 Thousand/uL      Absolute Lymphocytes 1.95 Thousands/µL      Absolute Monocytes 1.32 Thousand/µL      Eosinophils Absolute 0.20 Thousand/µL      Basophils Absolute 0.06 Thousands/µL     Fingerstick Glucose (POCT) [[id number]]  (Abnormal) Collected: 07/01/25 2316    Lab Status: Final result Specimen: Blood Updated: 07/01/25 2317     POC Glucose 232 mg/dl     HS Troponin I 2hr [[id number]]  (Normal) Collected: 07/01/25 2236    Lab Status: Final result Specimen: Blood from Arm, Left Updated: 07/01/25 2307     hs TnI 2hr 11 ng/L      Delta 2hr hsTnI 1 ng/L     Potassium [[id number]]  (Abnormal) Collected:  07/01/25 2236    Lab Status: Final result Specimen: Blood from Arm, Left Updated: 07/01/25 2302     Potassium 7.0 mmol/L     Narrative:      Specimen Icteric.    HS Troponin 0hr (reflex protocol) [[id number]]  (Normal) Collected: 07/01/25 2026    Lab Status: Final result Specimen: Blood from Arm, Left Updated: 07/01/25 2210     hs TnI 0hr 10 ng/L     Ammonia [[id number]]  (Normal) Collected: 07/01/25 2123    Lab Status: Final result Specimen: Blood from Arm, Left Updated: 07/01/25 2201     Ammonia 43 umol/L     Narrative:      Specimen Icteric.    Comprehensive metabolic panel [[id number]]  (Abnormal) Collected: 07/01/25 2026    Lab Status: Final result Specimen: Blood from Arm, Left Updated: 07/01/25 2113     Sodium 130 mmol/L      Potassium 6.9 mmol/L      Chloride 103 mmol/L      CO2 17 mmol/L      ANION GAP 10 mmol/L      BUN 55 mg/dL      Creatinine 3.91 mg/dL      Glucose 245 mg/dL      Calcium 10.1 mg/dL      AST 60 U/L      ALT 33 U/L      Alkaline Phosphatase 275 U/L      Total Protein 7.0 g/dL      Albumin 4.1 g/dL      Total Bilirubin 24.76 mg/dL      eGFR 16 ml/min/1.73sq m     Narrative:      Specimen Icteric.  National Kidney Disease Foundation guidelines for Chronic Kidney Disease (CKD):     Stage 1 with normal or high GFR (GFR > 90 mL/min/1.73 square meters)    Stage 2 Mild CKD (GFR = 60-89 mL/min/1.73 square meters)    Stage 3A Moderate CKD (GFR = 45-59 mL/min/1.73 square meters)    Stage 3B Moderate CKD (GFR = 30-44 mL/min/1.73 square meters)    Stage 4 Severe CKD (GFR = 15-29 mL/min/1.73 square meters)    Stage 5 End Stage CKD (GFR <15 mL/min/1.73 square meters)  Note: GFR calculation is accurate only with a steady state creatinine    Lipase [[id number]]  (Abnormal) Collected: 07/01/25 2026    Lab Status: Final result Specimen: Blood from Arm, Left Updated: 07/01/25 2112     Lipase <6 u/L     Narrative:      Specimen Icteric.    Protime-INR [[id number]]  (Abnormal) Collected: 07/01/25 2026    Lab  Status: Final result Specimen: Blood from Arm, Left Updated: 07/01/25 2054     Protime 16.6 seconds      INR 1.33    Narrative:      INR Therapeutic Range    Indication                                             INR Range      Atrial Fibrillation                                               2.0-3.0  Hypercoagulable State                                    2.0.2.3  Left Ventricular Asist Device                            2.0-3.0  Mechanical Heart Valve                                  -    Aortic(with afib, MI, embolism, HF, LA enlargement,    and/or coagulopathy)                                     2.0-3.0 (2.5-3.5)     Mitral                                                             2.5-3.5  Prosthetic/Bioprosthetic Heart Valve               2.0-3.0  Venous thromboembolism (VTE: VT, PE        2.0-3.0    APTT [[id number]]  (Normal) Collected: 07/01/25 2026    Lab Status: Final result Specimen: Blood from Arm, Left Updated: 07/01/25 2054     PTT 33 seconds     Ethanol [[id number]]  (Normal) Collected: 07/01/25 2026    Lab Status: Final result Specimen: Blood from Arm, Left Updated: 07/01/25 2051     Ethanol Lvl <10 mg/dL     CBC and differential [[id number]]  (Abnormal) Collected: 07/01/25 2026    Lab Status: Final result Specimen: Blood from Arm, Left Updated: 07/01/25 2038     WBC 18.89 Thousand/uL      RBC 3.12 Million/uL      Hemoglobin 10.0 g/dL      Hematocrit 29.3 %      MCV 94 fL      MCH 32.1 pg      MCHC 34.1 g/dL      RDW 15.1 %      MPV 12.1 fL      Platelets 277 Thousands/uL      nRBC 0 /100 WBCs      Segmented % 78 %      Immature Grans % 1 %      Lymphocytes % 10 %      Monocytes % 9 %      Eosinophils Relative 2 %      Basophils Relative 0 %      Absolute Neutrophils 15.00 Thousands/µL      Absolute Immature Grans 0.10 Thousand/uL      Absolute Lymphocytes 1.82 Thousands/µL      Absolute Monocytes 1.60 Thousand/µL      Eosinophils Absolute 0.29 Thousand/µL      Basophils Absolute 0.08 Thousands/µL   "           CT chest abdomen pelvis wo contrast   Final Interpretation by Yemi Betancourt MD (07/02 0018)   1.  Trace right pleural effusion, decreased since the prior study. Bibasilar linear parenchymal scarring and dependent atelectasis noted. No lobar airspace consolidation. Aeration of the lower lung zones appear improved compared to the prior study. Upper    to mid lung fields are well aerated and clear.   2.  Sequela of chronic pancreatitis again seen.   3.  Mild wall thickening of multiple abdominal/pelvic small bowel loops as well as portions of the colon likely reactive in the setting of ascites. Can not exclude possibility of mild diffuse enterocolitis. No evidence of bowel obstruction, appendicitis,    or free air. Large volume of abdominal/pelvic ascites again noted, similar compared to the prior exam.   4.  Mild diffuse subcutaneous edema throughout the chest, abdomen, and pelvis. Additional ancillary findings detailed above.            Workstation performed: [id number]             Procedures    ED Medication and Procedure Management   Prior to Admission Medications   Prescriptions Last Dose Informant Patient Reported? Taking?   Alcohol Swabs 70 % PADS   No Yes   Sig: May substitute brand based on insurance coverage. Check glucose TID.   Blood Glucose Monitoring Suppl (OneTouch Verio Reflect) w/Device KIT   No Yes   Sig: May substitute brand based on insurance coverage. Check glucose TID.   Insulin Syringe-Needle U-100 (BD Insulin Syringe U/F) 31G X 5/16\" 0.3 ML MISC   No Yes   Sig: For use with insulin. Pharmacy may dispense brand covered by insurance.   Multiple Vitamin (multivitamin) tablet   No No   Sig: Take 1 tablet by mouth daily   OneTouch Delica Lancets 33G MISC   No Yes   Sig: May substitute brand based on insurance coverage. Check glucose TID.   amLODIPine (NORVASC) 5 mg tablet   No No   Sig: Take 1 tablet (5 mg total) by mouth 2 (two) times a day   carvedilol (COREG) 6.25 mg tablet   No Yes "   Sig: Take 1 tablet (6.25 mg total) by mouth 2 (two) times a day with meals   folic acid ( Folic Acid) 1 mg tablet   No Yes   Sig: Take 1 tablet (1 mg total) by mouth daily   glucose blood (OneTouch Verio) test strip   No Yes   Sig: May substitute brand based on insurance coverage. Check glucose TID.   hydrALAZINE (APRESOLINE) 25 mg tablet   No No   Sig: Take 1 tablet (25 mg total) by mouth every 8 (eight) hours   insulin NPH-insulin regular (HumuLIN 70/30) 100 units/mL subcutaneous injection   No Yes   Sig: Inject 8 Units under the skin 2 (two) times a day before meals Do not give yourself insulin if you are not going to eat.   pancrelipase, Lip-Prot-Amyl, (CREON) 24,000 units   No Yes   Sig: Take 24,000 units of lipase by mouth 3 (three) times a day with meals   sodium bicarbonate 650 mg tablet   No Yes   Sig: Take 2 tablets (1,300 mg total) by mouth 3 (three) times daily after meals   thiamine 50 MG tablet   No Yes   Sig: Take 1 tablet (50 mg total) by mouth daily      Facility-Administered Medications: None     Current Discharge Medication List        CONTINUE these medications which have NOT CHANGED    Details   Alcohol Swabs 70 % PADS May substitute brand based on insurance coverage. Check glucose TID.  Qty: 100 each, Refills: 0    Associated Diagnoses: Type 2 diabetes mellitus with hyperosmolarity without coma, without long-term current use of insulin (HCC)      Blood Glucose Monitoring Suppl (OneTouch Verio Reflect) w/Device KIT May substitute brand based on insurance coverage. Check glucose TID.  Qty: 1 kit, Refills: 0    Associated Diagnoses: Type 2 diabetes mellitus with hyperosmolarity without coma, without long-term current use of insulin (HCC)      carvedilol (COREG) 6.25 mg tablet Take 1 tablet (6.25 mg total) by mouth 2 (two) times a day with meals  Qty: 60 tablet, Refills: 0    Associated Diagnoses: Paroxysmal A-fib (HCC)      folic acid ( Folic Acid) 1 mg tablet Take 1 tablet (1 mg total) by  "mouth daily  Qty: 30 tablet, Refills: 0    Associated Diagnoses: Alcohol abuse with withdrawal ([facility])      glucose blood (OneTouch Verio) test strip May substitute brand based on insurance coverage. Check glucose TID.  Qty: 100 each, Refills: 0    Associated Diagnoses: Type 2 diabetes mellitus with hyperosmolarity without coma, without long-term current use of insulin ([facility])      insulin NPH-insulin regular (HumuLIN 70/30) 100 units/mL subcutaneous injection Inject 8 Units under the skin 2 (two) times a day before meals Do not give yourself insulin if you are not going to eat.    Associated Diagnoses: Type 2 diabetes mellitus ([facility])      Insulin Syringe-Needle U-100 (BD Insulin Syringe U/F) 31G X 5/16\" 0.3 ML MISC For use with insulin. Pharmacy may dispense brand covered by insurance.  Qty: 100 each, Refills: 0    Associated Diagnoses: Type 2 diabetes mellitus ([facility])      OneTouch Delica Lancets 33G MISC May substitute brand based on insurance coverage. Check glucose TID.  Qty: 100 each, Refills: 0    Associated Diagnoses: Type 2 diabetes mellitus with hyperosmolarity without coma, without long-term current use of insulin ([facility])      pancrelipase, Lip-Prot-Amyl, (CREON) 24,000 units Take 24,000 units of lipase by mouth 3 (three) times a day with meals  Qty: 90 capsule, Refills: 0    Associated Diagnoses: Type 2 diabetes mellitus with hyperglycemia, without long-term current use of insulin ([facility])      sodium bicarbonate 650 mg tablet Take 2 tablets (1,300 mg total) by mouth 3 (three) times daily after meals  Qty: 90 tablet, Refills: 0    Associated Diagnoses: Acute kidney injury (HCC)      thiamine 50 MG tablet Take 1 tablet (50 mg total) by mouth daily  Qty: 30 tablet, Refills: 0    Associated Diagnoses: Alcohol abuse with withdrawal ([facility])      amLODIPine (NORVASC) 5 mg tablet Take 1 tablet (5 mg total) by mouth 2 (two) times a day  Qty: 120 tablet, Refills: 0    Associated Diagnoses: Essential hypertension    "   hydrALAZINE (APRESOLINE) 25 mg tablet Take 1 tablet (25 mg total) by mouth every 8 (eight) hours  Qty: 180 tablet, Refills: 0    Associated Diagnoses: Essential hypertension      Multiple Vitamin (multivitamin) tablet Take 1 tablet by mouth daily  Qty: 30 tablet, Refills: 0    Associated Diagnoses: Type 2 diabetes mellitus with hyperosmolarity without coma, without long-term current use of insulin (HCC)           No discharge procedures on file.  ED SEPSIS DOCUMENTATION   Time reflects when diagnosis was documented in both MDM as applicable and the Disposition within this note       Time User Action Codes Description Comment    7/2/2025 12:42 AM Irene Ruiz [K70.11] Ascites due to alcoholic hepatitis     7/2/2025 12:43 AM Irene Ruiz [N18.9] Chronic renal disease     7/2/2025 12:43 AM Irene Ruiz [K86.1] Chronic pancreatitis (HCC)     7/2/2025 12:44 AM Liu Vergara Add [N18.4] CKD (chronic kidney disease) stage 4, GFR 15-29 ml/min (HCC)                    [1]   Past Medical History:  Diagnosis Date    Alcohol abuse     Chronic pancreatitis (HCC)     Diabetes mellitus (HCC)     Type 2    Non compliance w medication regimen     Pancreatitis     Paroxysmal A-fib (HCC)     Thrombocytopenia (HCC)    [2]   Past Surgical History:  Procedure Laterality Date    INCISION AND DRAINAGE OF WOUND N/A 8/16/2020    Procedure: INCISION AND DRAINAGE (I&D) HEAD/FACE;  Surgeon: Estrada Walton DMD;  Location: [facility];  Service: Maxillofacial    IR BIOPSY BONE MARROW  2/7/2019    IR PARACENTESIS  6/3/2025    IR PARACENTESIS  6/13/2025    IR PARACENTESIS  6/18/2025    REMOVAL OF IMPACTED TOOTH - COMPLETELY BONY N/A 8/16/2020    Procedure: EXTRACTION TEETH MULTIPLE #2, 3;  Surgeon: Estrada Walton DMD;  Location: [facility];  Service: Maxillofacial   [3]   Family History  Problem Relation Name Age of Onset    Diabetes Mother      Hypertension Mother      Hyperlipidemia Father     [4]   Social History  Tobacco Use     Smoking status: Some Days     Types: Cigars, Cigarettes     Passive exposure: Past   Vaping Use    Vaping status: Unknown   Substance Use Topics    Alcohol use: Not Currently     Comment: denies etoh    Drug use: Not Currently     Types: Cocaine        Irene Ruiz PA-C  07/02/25 0848

## 2025-07-02 NOTE — ASSESSMENT & PLAN NOTE
Potassium as high as 8.7, improved to 5.6 with medical management  Repeat potassium pending  Low potassium diet, also on bicarb tabs

## 2025-07-02 NOTE — ASSESSMENT & PLAN NOTE
Etiology: Suspect prerenal component secondary to decreased p.o. intake + decreased effective circulating blood volume given liver disease. HRS on the differential    Baseline creatinine previously around 1.8-2.1 April-May of this year.  Unfortunately patient had worsening renal function and new baseline appeared to be closer to 2.9-3.0.  Has had several recent admissions with [facility] and incomplete recovery.  More recent baseline ranging from 3.2-3.4  Creatinine on admission 3.9  Current creatinine 3.7   CT on admission without hydro, but did note trace right pleural effusion, atelectasis.  Possible enterocolitis.  Large volume ascites and mild diffuse subcutaneous edema  Plan:  Will check UA, urine studies     No use checking bladder scan given ascites  Recommend albumin after large-volume paracentesis-possible paracentesis today. Not currently on any diuretics but would hold diuretics day of paracentesis   Avoid NSAIDs, hypotension, IV contrast  Continue to monitor renal function and urine output  Consider goals of care discussion

## 2025-07-02 NOTE — CONSULTS
Consultation - Nephrology   Name: Wes Araujo 55 y.o. male I MRN: [id number]  Unit/Bed#: E4 -01 I Date of Admission: 7/1/2025   Date of Service: 7/2/2025 I Hospital Day: 0   Inpatient consult to Nephrology  Consult performed by: Dian Ayala PA-C  Consult ordered by: ARIANE Prieto        Physician Requesting Evaluation: Stephani Casanova DO   Reason for Evaluation / Principal Problem: ZENAIDA on CKD    Assessment & Plan  Acute renal failure superimposed on stage 4 chronic kidney disease (HCC)  Etiology: Suspect prerenal component secondary to decreased p.o. intake + decreased effective circulating blood volume given liver disease. HRS on the differential    Baseline creatinine previously around 1.8-2.1 April-May of this year.  Unfortunately patient had worsening renal function and new baseline appeared to be closer to 2.9-3.0.  Has had several recent admissions with ZENAIDA and incomplete recovery.  More recent baseline ranging from 3.2-3.4  Creatinine on admission 3.9  Current creatinine 3.7   CT on admission without hydro, but did note trace right pleural effusion, atelectasis.  Possible enterocolitis.  Large volume ascites and mild diffuse subcutaneous edema  Plan:  Will check UA, urine studies     No use checking bladder scan given ascites  Recommend albumin after large-volume paracentesis-possible paracentesis today. Not currently on any diuretics but would hold diuretics day of paracentesis   Avoid NSAIDs, hypotension, IV contrast  Continue to monitor renal function and urine output  Consider goals of care discussion  Hyperkalemia  Potassium as high as 8.7, improved to 5.6 with medical management  Repeat potassium pending  Low potassium diet, also on bicarb tabs    Electrolyte abnormality  Low serum bicarb:   Bicarb 16  On bicarb tabs 1300 mg 3 times daily  Hyponatremia:  133 however corrects to 135 when accounting for hyperglycemia  Monitor    Primary hypertension  BP previously elevated at  174/94, slightly improved 154/77  Home regimen: Amlodipine 5 mg twice daily, Coreg 6.25 mg twice daily, hydralazine 25 mg every 8  Not currently on any antihypertensives, consider resuming home regimen with hold parameters  Avoid relative hypotension and perturbations of blood pressure  Alcoholic hepatitis with ascites  CT findings with ascites as above  Last paracentesis 6/18 with 5700 ml removed   Received 0.5 mg IV Bumex  GI consulted  Alcohol-induced chronic pancreatitis (HCC)  CT with sequela of chronic pancreatitis and parenchymal atrophy, mild main ductal dilation also noted  On Creon per primary team  Alcohol abuse  Per primary team  Type 2 diabetes mellitus with hyperglycemia, with long-term current use of insulin (HCC)  Lab Results   Component Value Date    HGBA1C 9.6 (H) 05/26/2025   Per primary team      History of Present Illness   Wes Araujo is a 55 y.o. male with a past medical history of alcoholic liver disease with ascites, pancreatitis, diabetes, CKD, history of ZENAIDA, A-fib who was admitted to [facility] after presenting with recurrent abdominal distention, inability to eat. A renal consultation is requested today for assistance in the management of ZENAIDA on CKD.   #[id number] used for translation. He has had multiple recent admissions.  He was seen by nephrology for ZENAIDA on CKD and was felt to have ATN versus HRS.  Overall worsening renal function and concerns for progression of CKD. Patient seen and examined at bedside.  He notes not being able to hear her well, can only really hear out of his left ear-states this has been going on for a while.  He states he came into the hospital as he was generally feeling unwell with worsening abdominal pain and unable to eat.  He states he is very sick and has problems with his liver and kidney.  He currently denies shortness of breath, chest pain, dizziness.  Does have a headache.  States he has been urinating less recently.  Denies diarrhea or  "vomiting.    Review of Systems   Constitutional:  Positive for appetite change.   Respiratory:  Negative for chest tightness and shortness of breath.    Gastrointestinal:  Positive for abdominal distention and abdominal pain. Negative for diarrhea, nausea and vomiting.   Genitourinary:  Positive for decreased urine volume. Negative for dysuria and hematuria.   Neurological:  Positive for headaches. Negative for dizziness.     Medical History Review: I have reviewed the patient's PMH, PSH, Social History, Family History, Meds, and Allergies   Historical Information   Past Medical History[1]  Past Surgical History[2]  Social History[3]  E-Cigarette/Vaping    E-Cigarette Use Unknown If Ever Used      E-Cigarette/Vaping Substances    Nicotine No     THC No     CBD No     Flavoring No     Other No     Unknown No        Social History[4]    Current Facility-Administered Medications:     acetaminophen (TYLENOL) tablet 650 mg, Q6H PRN    aluminum-magnesium hydroxide-simethicone (MAALOX) oral suspension 30 mL, Q6H PRN    folic acid (FOLVITE) tablet 1 mg, Daily    insulin lispro (HumALOG/ADMELOG) 100 units/mL subcutaneous injection 1-5 Units, 4x Daily (AC & HS) **AND** Fingerstick Glucose (POCT), 4x Daily AC and at bedtime    ondansetron (ZOFRAN) injection 4 mg, Q6H PRN    pancrelipase (Lip-Prot-Amyl) (CREON) delayed release capsule 24,000 Units, TID With Meals    sodium bicarbonate tablet 1,300 mg, TID after meals    thiamine tablet 50 mg, Daily  Prior to Admission Medications   Prescriptions Last Dose Informant Patient Reported? Taking?   Alcohol Swabs 70 % PADS   No Yes   Sig: May substitute brand based on insurance coverage. Check glucose TID.   Blood Glucose Monitoring Suppl (OneTouch Verio Reflect) w/Device KIT   No Yes   Sig: May substitute brand based on insurance coverage. Check glucose TID.   Insulin Syringe-Needle U-100 (BD Insulin Syringe U/F) 31G X 5/16\" 0.3 ML MISC   No Yes   Sig: For use with insulin. Pharmacy " may dispense brand covered by insurance.   Multiple Vitamin (multivitamin) tablet   No No   Sig: Take 1 tablet by mouth daily   OneTouch Delica Lancets 33G MISC   No Yes   Sig: May substitute brand based on insurance coverage. Check glucose TID.   amLODIPine (NORVASC) 5 mg tablet   No No   Sig: Take 1 tablet (5 mg total) by mouth 2 (two) times a day   carvedilol (COREG) 6.25 mg tablet   No Yes   Sig: Take 1 tablet (6.25 mg total) by mouth 2 (two) times a day with meals   folic acid ( Folic Acid) 1 mg tablet   No Yes   Sig: Take 1 tablet (1 mg total) by mouth daily   glucose blood (OneTouch Verio) test strip   No Yes   Sig: May substitute brand based on insurance coverage. Check glucose TID.   hydrALAZINE (APRESOLINE) 25 mg tablet   No No   Sig: Take 1 tablet (25 mg total) by mouth every 8 (eight) hours   insulin NPH-insulin regular (HumuLIN 70/30) 100 units/mL subcutaneous injection   No Yes   Sig: Inject 8 Units under the skin 2 (two) times a day before meals Do not give yourself insulin if you are not going to eat.   pancrelipase, Lip-Prot-Amyl, (CREON) 24,000 units   No Yes   Sig: Take 24,000 units of lipase by mouth 3 (three) times a day with meals   sodium bicarbonate 650 mg tablet   No Yes   Sig: Take 2 tablets (1,300 mg total) by mouth 3 (three) times daily after meals   thiamine 50 MG tablet   No Yes   Sig: Take 1 tablet (50 mg total) by mouth daily      Facility-Administered Medications: None     Patient has no known allergies.    Objective :  Temp:  [97.7 °F (36.5 °C)-98 °F (36.7 °C)] 97.8 °F (36.6 °C)  HR:  [72-81] 75  BP: (142-174)/(77-94) 154/77  Resp:  [16-21] 20  SpO2:  [98 %-100 %] 98 %  O2 Device: None (Room air)    Current Weight: Weight - Scale: 56.2 kg (123 lb 14.4 oz)  First Weight: Weight - Scale: 56.2 kg (123 lb 14.4 oz)  I/O         06/30 0701 07/01 0700 07/01 0701 07/02 0700 07/02 0701 07/03 0700    P.O.  300     IV Piggyback  100     Total Intake(mL/kg)  400 (7.1)     Net  +400          "         Physical Exam  Vitals and nursing note reviewed.   Constitutional:       Appearance: He is ill-appearing.     Eyes:      General: Scleral icterus present.       Cardiovascular:      Rate and Rhythm: Normal rate and regular rhythm.   Pulmonary:      Effort: No respiratory distress.      Breath sounds: No rales.      Comments: Slightly diminished breath sounds at the bases  Abdominal:      General: There is distension.      Tenderness: There is abdominal tenderness (RUQ).     Musculoskeletal:      Right lower leg: No edema.      Left lower leg: No edema.     Skin:     General: Skin is warm and dry.      Coloration: Skin is jaundiced.     Neurological:      General: No focal deficit present.      Mental Status: He is alert.     Psychiatric:         Mood and Affect: Mood normal.       Medications:  Current Medications[5]      Lab Results: I have reviewed the following results:  Results from last 7 days   Lab Units 07/02/25  0301 07/02/25  0028 07/01/25  2236 07/01/25  2026   WBC Thousand/uL  --  17.38*  --  18.89*   HEMOGLOBIN g/dL  --  10.9*  --  10.0*   HEMATOCRIT %  --  32.2*  --  29.3*   PLATELETS Thousands/uL  --  276  --  277   POTASSIUM mmol/L 5.6*  5.6* 8.7* 7.0* 6.9*   CHLORIDE mmol/L 107 105  --  103   CO2 mmol/L 16* 14*  --  17*   BUN mg/dL 52* 51*  --  55*   CREATININE mg/dL 3.71* 3.93*  --  3.91*   CALCIUM mg/dL 9.6 9.8  --  10.1   MAGNESIUM mg/dL  --  2.7  --   --    PHOSPHORUS mg/dL  --  2.8  --   --    ALBUMIN g/dL  --  4.1  --  4.1       Administrative Statements   Portions of the record may have been created with voice recognition software. Occasional wrong word or \"sound a like\" substitutions may have occurred due to the inherent limitations of voice recognition software. Read the chart carefully and recognize, using context, where substitutions have occurred.If you have any questions, please contact the dictating provider.         [1]   Past Medical History:  Diagnosis Date    Alcohol abuse "     Chronic pancreatitis (HCC)     Diabetes mellitus (HCC)     Type 2    Non compliance w medication regimen     Pancreatitis     Paroxysmal A-fib (HCC)     Thrombocytopenia (HCC)    [2]   Past Surgical History:  Procedure Laterality Date    INCISION AND DRAINAGE OF WOUND N/A 8/16/2020    Procedure: INCISION AND DRAINAGE (I&D) HEAD/FACE;  Surgeon: Estrada Walton DMD;  Location: [facility];  Service: Maxillofacial    IR BIOPSY BONE MARROW  2/7/2019    IR PARACENTESIS  6/3/2025    IR PARACENTESIS  6/13/2025    IR PARACENTESIS  6/18/2025    REMOVAL OF IMPACTED TOOTH - COMPLETELY BONY N/A 8/16/2020    Procedure: EXTRACTION TEETH MULTIPLE #2, 3;  Surgeon: Estrada Walton DMD;  Location: [facility];  Service: Maxillofacial   [3]   Social History  Tobacco Use    Smoking status: Some Days     Types: Cigars, Cigarettes     Passive exposure: Past   Vaping Use    Vaping status: Unknown   Substance and Sexual Activity    Alcohol use: Not Currently     Comment: denies etoh    Drug use: Not Currently     Types: Cocaine   [4]   Social History  Tobacco Use    Smoking status: Some Days     Types: Cigars, Cigarettes     Passive exposure: Past   Vaping Use    Vaping status: Unknown   Substance and Sexual Activity    Alcohol use: Not Currently     Comment: denies etoh    Drug use: Not Currently     Types: Cocaine   [5]   Current Facility-Administered Medications:     acetaminophen (TYLENOL) tablet 650 mg, 650 mg, Oral, Q6H PRN, ARIANE Prieto    aluminum-magnesium hydroxide-simethicone (MAALOX) oral suspension 30 mL, 30 mL, Oral, Q6H PRN, ARIANE Prieto    folic acid (FOLVITE) tablet 1 mg, 1 mg, Oral, Daily, ARIANE Prieto, 1 mg at 07/02/25 0807    insulin lispro (HumALOG/ADMELOG) 100 units/mL subcutaneous injection 1-5 Units, 1-5 Units, Subcutaneous, 4x Daily (AC & HS), 2 Units at 07/02/25 0807 **AND** Fingerstick Glucose (POCT), , , 4x Daily AC and at bedtime, ARIANE Prieto     ondansetron (ZOFRAN) injection 4 mg, 4 mg, Intravenous, Q6H PRN, ARIANE Prieto    pancrelipase (Lip-Prot-Amyl) (CREON) delayed release capsule 24,000 Units, 24,000 Units, Oral, TID With Meals, ARIANE Prieto, 24,000 Units at 07/02/25 0807    sodium bicarbonate tablet 1,300 mg, 1,300 mg, Oral, TID after meals, ARIANE Prieto, 1,300 mg at 07/02/25 0807    thiamine tablet 50 mg, 50 mg, Oral, Daily, ARIANE Prieto, 50 mg at 07/02/25 0807

## 2025-07-02 NOTE — ASSESSMENT & PLAN NOTE
BP previously elevated at 174/94, slightly improved 154/77  Home regimen: Amlodipine 5 mg twice daily, Coreg 6.25 mg twice daily, hydralazine 25 mg every 8  Not currently on any antihypertensives, consider resuming home regimen with hold parameters  Avoid relative hypotension and perturbations of blood pressure

## 2025-07-02 NOTE — ASSESSMENT & PLAN NOTE
Since patient left AMA recently he has not taken any of his insulin or oral medications  Continue 70/30 insulin 8 units twice daily  Sliding scale insulin  ACHS glucose checks  Diabetic diet  Hypoglycemia protocol    Lab Results   Component Value Date    HGBA1C 9.6 (H) 05/26/2025

## 2025-07-02 NOTE — CONSULTS
Consultation - Gastroenterology   Name: Wes Araujo 55 y.o. male I MRN: [id number]  Unit/Bed#: E4 -01 I Date of Admission: 7/1/2025   Date of Service: 7/2/2025 I Hospital Day: 0       Inpatient consult to gastroenterology     Date/Time  7/2/2025 8:31 AM     Performed by  Gwen Boone DO   Authorized by  ARIANE Prieto           Physician Requesting Evaluation: Stephani Casanova DO   Reason for Evaluation / Principal Problem: ascites    Assessment & Plan  Alcoholic hepatitis with ascites  55-year-old male with a past medical history of hypertension, diabetes mellitus type 2, chronic pancreatitis secondary to chronic alcohol use and recent admission for alc hep with likely ZENAIDA on CKD (creatinine 3.01 at discharge during previous hospitalization, previously 0.8-1 in 2024 but more recently 1.7-2) who presented to [facility] on 6/12 for concern of recurrent abdominal distension with associated pain in the setting of constipation.   Labs on presentation significant for continued improvement in AST/ALT/ALP since last admission, however, bilirubin continues to increase.  INR 1.18 on presentation (peaked during prior admission at 5.53 with dramatic improvement after Vitamin K). Kidney function has been progressively worsening since last hospitalization with new peak Cr 3.93 with severe hyperkalemia.     Patient previously underwent serologic liver workup negative for alternative cause of liver injury with elevations thought to be 2/2 alc hep in setting of some degree of underlying fibrosis.  Patient was treated with conservative management without steroids due to low Maddrey's score.  Patient was recommended to follow-up with gastroenterology but now presenting with recurrent abdominal distention/pain. Last hospitalization 6/12-6/20 further complicated by continued abdominal pain likely in the setting of constipation as well as worsening ZENAIDA with concern for HRS now on octreotide and albumin.    Maddrey's  Discriminant Function - Control Prothrombin 13: 40.67 at 7/2/2025 12:28 AM  Calculated from:  Total Bilirubin: 24.11 mg/dL at 7/2/2025 12:28 AM  Prothrombin Time: 16.6 seconds at 7/1/2025  8:26 PM  MDF = (4.6 * (PT - Control PT)) + Bilirubin     MELD 3.0: 36 at 7/2/2025  3:01 AM  MELD-Na: 35 at 7/2/2025  3:01 AM  Calculated from:  Serum Creatinine: 3.71 mg/dL (Using max of 3 mg/dL) at 7/2/2025  3:01 AM  Serum Sodium: 133 mmol/L at 7/2/2025  3:01 AM  Total Bilirubin: 24.11 mg/dL at 7/2/2025 12:28 AM  Serum Albumin: 4.1 g/dL (Using max of 3.5 g/dL) at 7/2/2025 12:28 AM  INR(ratio): 1.33 at 7/1/2025  8:26 PM  Age at listing (hypothetical): 55 years  Sex: Male at 7/2/2025  3:01 AM    # Elevated Liver Chemistries - Alcohol hepatitis:   Monitor and trend LFTs daily along with INR  Avoid hepatotoxic medications, strongly encourage complete alcohol cessation  Consider liver biopsy outpatient given significant alkaline phosphatase elevation prior to admission  Additional pain and symptom management per primary team     # Ascites with ZENAIDA/CKD:   Repeat IR paracentesis pending - follow up fluid studies  Most recent paracentesis on 6/18 with removal of 5700 cc without evidence of SBP  Nephrology following - appreciate recommendations  Cr worsening - consider addition of albumin, previously no improvement with HRS treatment  Discussed with hepatology and will review with transplant center given progressive worsening  Monitor and trend serum creatinine and electrolytes daily, monitor urine output     # History of Coffee Ground Emesis and Melena:  Status post EGD 6/13/2025 with evidence of grade D esophagitis; history of gastric dieulafoy lesion as well 2/2025  Continue on Protonix 40 mg twice daily by mouth  Monitor and trend hemoglobin, transfuse for goal greater than 7  Alert GI team of any concern for recurrent GI bleeding     # Transplant  Patient with history of alcoholic hepatitis and now concern for HRS which may  potentiate need for transplant.   Peth from 6/18 negative - patient reports abstinence since first admission with concern for alc hep  Has adequate family social support  Ambulated with rolling walked  No previous DUIs or legal trouble associated with drinking.  No previous stays in alcohol rehabilitation  He has multiple hospitalizations each year in the setting of alcohol use with encephalopathy, hypoglycemia as well as more recently alcoholic hepatitis.  Unfortunately, he has poor insight into how severe his condition is and frequently becomes hyperfocused on certain unrelated matters.  Case previously reviewed with Dr. Yola Paige, hepatology, who stated although HRS less likely with elevated urine sodium, it would be reasonable to reach out to [facility] to discuss for potential transfer for transplant evaluation, but only if patient understanding of severity of condition and agreeable to transfer.  Discussed with patient at bedside today who is agreeable to transfer and transplant evaluation. Attempted to reach patient's family via telephone but unable to reach  Have reached out to [facility] transplant team to discuss case and awaiting reply  Acute renal failure superimposed on stage 3 chronic kidney disease (HCC)  Lab Results   Component Value Date    EGFR 17 07/02/2025    EGFR 16 07/02/2025    EGFR 16 07/01/2025    CREATININE 3.71 (H) 07/02/2025    CREATININE 3.93 (H) 07/02/2025    CREATININE 3.91 (H) 07/01/2025     Alcohol-induced chronic pancreatitis (HCC)    Alcohol abuse  Sober since admission for alcoholic hepatitis (almost 2 months per his report. Pet negative 6/18      History of Present Illness   HPI:  Wes Araujo is a 55 y.o. male with history of hypertension, CKD, type 2 diabetes mellitus, chronic pancreatitis in setting of chronic alcohol use, alcohol abuse, and recent admission for alcoholic hepatitis (with no history of cirrhosis) who presented to [facility] on 7/1 for  concern of worsening abdominal distention and decreased p.o. intake due to abdominal discomfort.  Patient was recently admitted 6/12 through 6/20 for coffee-ground emesis, elevated LFTs in setting of alc hep, progressive ZENAIDA on CKD with concern for underlying HRS in setting of alc hep. He was discussed with hepatology at that time, and it was recommended to discuss his case with transplant centers. At that time, patient wanted to go home, and ultimately left AMA. He now returns with recurrence of ascites and abdominal discomfort.     Review of Systems   Constitutional:  Positive for fatigue. Negative for appetite change, chills, fever and unexpected weight change.   HENT:  Negative for sore throat and trouble swallowing.    Respiratory:  Negative for cough and shortness of breath.    Cardiovascular:  Negative for chest pain and leg swelling.   Gastrointestinal:  Positive for abdominal pain. Negative for constipation, diarrhea, nausea and vomiting.   Musculoskeletal:  Negative for arthralgias and back pain.   Skin:  Positive for color change. Negative for rash.   Neurological:  Positive for weakness. Negative for dizziness, light-headedness and headaches.   Psychiatric/Behavioral:  Negative for confusion. The patient is not nervous/anxious.      Medical History Review: I have reviewed the patient's PMH, PSH, Social History, Family History, Meds, and Allergies     Objective :  Temp:  [97.7 °F (36.5 °C)-98 °F (36.7 °C)] 97.8 °F (36.6 °C)  HR:  [72-81] 75  BP: (142-174)/(77-94) 154/77  Resp:  [16-21] 20  SpO2:  [98 %-100 %] 98 %  O2 Device: None (Room air)    Physical Exam  Vitals reviewed.   Constitutional:       Appearance: He is cachectic. He is ill-appearing.   HENT:      Head: Normocephalic and atraumatic.      Mouth/Throat:      Mouth: Mucous membranes are moist.      Pharynx: Oropharynx is clear.     Eyes:      General: Scleral icterus present.       Cardiovascular:      Rate and Rhythm: Normal rate and regular  rhythm.   Pulmonary:      Effort: Pulmonary effort is normal. No respiratory distress.   Abdominal:      General: There is distension.      Palpations: There is no mass.      Tenderness: There is abdominal tenderness. There is no guarding or rebound.      Hernia: No hernia is present.     Musculoskeletal:         General: No swelling. Normal range of motion.      Right lower leg: No edema.      Left lower leg: No edema.     Skin:     General: Skin is warm and dry.      Coloration: Skin is jaundiced.      Findings: Bruising present.     Neurological:      Mental Status: He is oriented to person, place, and time.      Motor: Weakness present.           Lab Results: I have reviewed the following results:

## 2025-07-02 NOTE — ASSESSMENT & PLAN NOTE
History of paroxysmal atrial fibrillation  Restart Coreg 6.25 mg twice daily  Not on anticoagulation

## 2025-07-03 ENCOUNTER — APPOINTMENT (INPATIENT)
Dept: RADIOLOGY | Facility: HOSPITAL | Age: 55
End: 2025-07-03
Attending: INTERNAL MEDICINE
Payer: COMMERCIAL

## 2025-07-03 PROBLEM — R80.1 PERSISTENT PROTEINURIA: Status: ACTIVE | Noted: 2025-07-03

## 2025-07-03 LAB
ALBUMIN SERPL BCG-MCNC: 4.5 G/DL (ref 3.5–5)
ALP SERPL-CCNC: 268 U/L (ref 34–104)
ALT SERPL W P-5'-P-CCNC: 30 U/L (ref 7–52)
ANION GAP SERPL CALCULATED.3IONS-SCNC: 14 MMOL/L (ref 4–13)
APPEARANCE FLD: CLEAR
AST SERPL W P-5'-P-CCNC: 55 U/L (ref 13–39)
BILIRUB DIRECT SERPL-MCNC: 14.95 MG/DL (ref 0–0.2)
BILIRUB SERPL-MCNC: 25 MG/DL (ref 0.2–1)
BUN SERPL-MCNC: 51 MG/DL (ref 5–25)
CALCIUM SERPL-MCNC: 10.3 MG/DL (ref 8.4–10.2)
CHLORIDE SERPL-SCNC: 103 MMOL/L (ref 96–108)
CO2 SERPL-SCNC: 17 MMOL/L (ref 21–32)
COLOR FLD: NORMAL
CREAT SERPL-MCNC: 3.93 MG/DL (ref 0.6–1.3)
GFR SERPL CREATININE-BSD FRML MDRD: 16 ML/MIN/1.73SQ M
GLUCOSE SERPL-MCNC: 220 MG/DL (ref 65–140)
GLUCOSE SERPL-MCNC: 229 MG/DL (ref 65–140)
GLUCOSE SERPL-MCNC: 245 MG/DL (ref 65–140)
GLUCOSE SERPL-MCNC: 264 MG/DL (ref 65–140)
GLUCOSE SERPL-MCNC: 271 MG/DL (ref 65–140)
HISTIOCYTES NFR FLD: 12 %
LYMPHOCYTES NFR BLD AUTO: 21 %
NEUTS SEG NFR BLD AUTO: 67 %
POTASSIUM SERPL-SCNC: 5.4 MMOL/L (ref 3.5–5.3)
PROT SERPL-MCNC: 7.3 G/DL (ref 6.4–8.4)
SITE: NORMAL
SODIUM SERPL-SCNC: 134 MMOL/L (ref 135–147)
TOTAL CELLS COUNTED SPEC: 100
WBC # FLD MANUAL: 196 /UL

## 2025-07-03 PROCEDURE — 82948 REAGENT STRIP/BLOOD GLUCOSE: CPT

## 2025-07-03 PROCEDURE — 99233 SBSQ HOSP IP/OBS HIGH 50: CPT | Performed by: INTERNAL MEDICINE

## 2025-07-03 PROCEDURE — 87070 CULTURE OTHR SPECIMN AEROBIC: CPT | Performed by: INTERNAL MEDICINE

## 2025-07-03 PROCEDURE — 0W9G3ZZ DRAINAGE OF PERITONEAL CAVITY, PERCUTANEOUS APPROACH: ICD-10-PCS | Performed by: RADIOLOGY

## 2025-07-03 PROCEDURE — 89051 BODY FLUID CELL COUNT: CPT | Performed by: INTERNAL MEDICINE

## 2025-07-03 PROCEDURE — 80076 HEPATIC FUNCTION PANEL: CPT | Performed by: INTERNAL MEDICINE

## 2025-07-03 PROCEDURE — 49083 ABD PARACENTESIS W/IMAGING: CPT

## 2025-07-03 PROCEDURE — 99232 SBSQ HOSP IP/OBS MODERATE 35: CPT | Performed by: INTERNAL MEDICINE

## 2025-07-03 PROCEDURE — NC001 PR NO CHARGE: Performed by: PHYSICIAN ASSISTANT

## 2025-07-03 PROCEDURE — 92610 EVALUATE SWALLOWING FUNCTION: CPT

## 2025-07-03 PROCEDURE — 87205 SMEAR GRAM STAIN: CPT | Performed by: INTERNAL MEDICINE

## 2025-07-03 PROCEDURE — 80048 BASIC METABOLIC PNL TOTAL CA: CPT

## 2025-07-03 RX ORDER — ALBUMIN (HUMAN) 12.5 G/50ML
12.5 SOLUTION INTRAVENOUS EVERY 8 HOURS
Start: 2025-07-03 | End: 2025-07-04

## 2025-07-03 RX ORDER — HYDRALAZINE HYDROCHLORIDE 20 MG/ML
5 INJECTION INTRAMUSCULAR; INTRAVENOUS EVERY 6 HOURS PRN
Start: 2025-07-03

## 2025-07-03 RX ORDER — ONDANSETRON 2 MG/ML
4 INJECTION INTRAMUSCULAR; INTRAVENOUS EVERY 6 HOURS PRN
Start: 2025-07-03

## 2025-07-03 RX ORDER — HYDRALAZINE HYDROCHLORIDE 20 MG/ML
5 INJECTION INTRAMUSCULAR; INTRAVENOUS EVERY 6 HOURS PRN
Status: DISCONTINUED | OUTPATIENT
Start: 2025-07-03 | End: 2025-07-05 | Stop reason: HOSPADM

## 2025-07-03 RX ORDER — INSULIN LISPRO 100 [IU]/ML
1-5 INJECTION, SOLUTION INTRAVENOUS; SUBCUTANEOUS
Start: 2025-07-03

## 2025-07-03 RX ORDER — AMLODIPINE BESYLATE 5 MG/1
5 TABLET ORAL DAILY
Status: DISCONTINUED | OUTPATIENT
Start: 2025-07-03 | End: 2025-07-05 | Stop reason: HOSPADM

## 2025-07-03 RX ORDER — LIDOCAINE WITH 8.4% SOD BICARB 0.9%(10ML)
SYRINGE (ML) INJECTION AS NEEDED
Status: COMPLETED | OUTPATIENT
Start: 2025-07-03 | End: 2025-07-03

## 2025-07-03 RX ORDER — HYDROMORPHONE HCL IN WATER/PF 6 MG/30 ML
0.2 PATIENT CONTROLLED ANALGESIA SYRINGE INTRAVENOUS EVERY 8 HOURS PRN
Start: 2025-07-03 | End: 2025-07-13

## 2025-07-03 RX ADMIN — INSULIN LISPRO 2 UNITS: 100 INJECTION, SOLUTION INTRAVENOUS; SUBCUTANEOUS at 08:07

## 2025-07-03 RX ADMIN — INSULIN LISPRO 3 UNITS: 100 INJECTION, SOLUTION INTRAVENOUS; SUBCUTANEOUS at 17:08

## 2025-07-03 RX ADMIN — PANCRELIPASE 24000 UNITS: 120000; 24000; 76000 CAPSULE, DELAYED RELEASE PELLETS ORAL at 08:07

## 2025-07-03 RX ADMIN — SODIUM BICARBONATE 650 MG TABLET 1300 MG: at 08:07

## 2025-07-03 RX ADMIN — PANCRELIPASE 24000 UNITS: 120000; 24000; 76000 CAPSULE, DELAYED RELEASE PELLETS ORAL at 17:08

## 2025-07-03 RX ADMIN — ALBUMIN (HUMAN) 12.5 G: 0.25 INJECTION, SOLUTION INTRAVENOUS at 21:40

## 2025-07-03 RX ADMIN — INSULIN LISPRO 2 UNITS: 100 INJECTION, SOLUTION INTRAVENOUS; SUBCUTANEOUS at 12:34

## 2025-07-03 RX ADMIN — FOLIC ACID 1 MG: 1 TABLET ORAL at 08:07

## 2025-07-03 RX ADMIN — THIAMINE HCL TAB 100 MG 50 MG: 100 TAB at 08:07

## 2025-07-03 RX ADMIN — SODIUM ZIRCONIUM CYCLOSILICATE 10 G: 10 POWDER, FOR SUSPENSION ORAL at 13:44

## 2025-07-03 RX ADMIN — INSULIN LISPRO 2 UNITS: 100 INJECTION, SOLUTION INTRAVENOUS; SUBCUTANEOUS at 21:40

## 2025-07-03 RX ADMIN — PANCRELIPASE 24000 UNITS: 120000; 24000; 76000 CAPSULE, DELAYED RELEASE PELLETS ORAL at 12:36

## 2025-07-03 RX ADMIN — CEFTRIAXONE 1000 MG: 10 INJECTION, POWDER, FOR SOLUTION INTRAVENOUS at 12:37

## 2025-07-03 RX ADMIN — ALBUMIN (HUMAN) 12.5 G: 0.25 INJECTION, SOLUTION INTRAVENOUS at 05:20

## 2025-07-03 RX ADMIN — AMLODIPINE BESYLATE 5 MG: 5 TABLET ORAL at 12:34

## 2025-07-03 RX ADMIN — ALBUMIN (HUMAN) 12.5 G: 0.25 INJECTION, SOLUTION INTRAVENOUS at 13:44

## 2025-07-03 RX ADMIN — SODIUM BICARBONATE 650 MG TABLET 1300 MG: at 12:34

## 2025-07-03 RX ADMIN — Medication 10 ML: at 09:08

## 2025-07-03 RX ADMIN — SODIUM BICARBONATE 650 MG TABLET 1300 MG: at 17:08

## 2025-07-03 NOTE — ASSESSMENT & PLAN NOTE
Lab Results   Component Value Date    EGFR 16 07/03/2025    EGFR 17 07/02/2025    EGFR 16 07/02/2025    CREATININE 3.93 (H) 07/03/2025    CREATININE 3.71 (H) 07/02/2025    CREATININE 3.93 (H) 07/02/2025

## 2025-07-03 NOTE — ASSESSMENT & PLAN NOTE
H/o  Patient reports that he is not currently drinking and has not drank since prior to his previous admission  Nonetheless, monitor for signs of withdrawal  Continue thiamine/folate supplementation

## 2025-07-03 NOTE — ASSESSMENT & PLAN NOTE
Low bicarb, check VBG to evaluate for metabolic acidosis versus compensation for respiratory alkalosis.  Patient initially refused labs today but now agreeable.  -Currently on sodium bicarb supplement 2 tablet p.o. 3 times daily.  -Bicarb level 16

## 2025-07-03 NOTE — ASSESSMENT & PLAN NOTE
Since patient left AMA recently he has not taken any of his insulin or oral medications  Continue 70/30 insulin 8 units twice daily- will adjust insulin as needed   Sliding scale insulin  ACHS glucose checks  Diabetic diet  Hypoglycemia protocol    Lab Results   Component Value Date    HGBA1C 9.6 (H) 05/26/2025

## 2025-07-03 NOTE — ASSESSMENT & PLAN NOTE
CT findings with ascites as above  Status post paracentesis on 7/3/2025 by IR today.  GI following.  Plan noted for transfer to [facility] for transplant evaluation.

## 2025-07-03 NOTE — ASSESSMENT & PLAN NOTE
ZENAIDA on CKD stage IV, overall fluctuating creatinine, baseline creatinine 1.8-2.1 since March 2025, episodes of ZENAIDA during recent hospitalizations noted, creatinine fluctuated mid to high twos in May 2025 and more recently during hospitalization 3.2-3.4 in June 2025  -Creatinine 3.9 on admission now slightly improved 3.7 yesterday.  Patient initially refused labs today but now agreeable.  Follow results for BMP  -ZENAIDA could be in the setting of decreased effective intravascular volume with multiple hyaline casts on UA in the recent past, incomplete recovery from recent ZENAIDA, component of HRS remains differential versus overall progression of CKD given uncontrolled diabetes  -Recent UA in June 2025 showed 3+ proteinuria, no hematuria with multiple hyaline cast.  -Follow results for repeat UA with microscopy, urine electrolytes.    -No hydro on CT scan.  Renal ultrasound in June 2025 showed normal-sized kidneys, somewhat echogenic renal parenchyma in both kidneys  - Undergoing paracentesis by IR today.  Agree with IV albumin post paracentesis.  -Currently avoiding diuretics.

## 2025-07-03 NOTE — ASSESSMENT & PLAN NOTE
55-year-old male with a past medical history of hypertension, diabetes mellitus type 2, chronic pancreatitis secondary to chronic alcohol use and recent admission for alc hep with likely ZENAIDA on CKD (creatinine 3.01 at discharge during previous hospitalization, previously 0.8-1 in 2024 but more recently 1.7-2) who presented to [facility] on 6/12 for concern of recurrent abdominal distension with associated pain in the setting of constipation.   Labs on presentation significant for continued improvement in AST/ALT/ALP since last admission, however, bilirubin continues to increase.  INR 1.18 on presentation (peaked during prior admission at 5.53 with dramatic improvement after Vitamin K). Kidney function has been progressively worsening since last hospitalization with new peak Cr 3.93 with severe hyperkalemia.     Patient previously underwent serologic liver workup negative for alternative cause of liver injury with elevations thought to be 2/2 alc hep in setting of some degree of underlying fibrosis.  Patient was treated with conservative management without steroids due to low Maddrey's score.  Patient was recommended to follow-up with gastroenterology but now presenting with recurrent abdominal distention/pain. Last hospitalization 6/12-6/20 further complicated by continued abdominal pain likely in the setting of constipation as well as worsening ZENAIDA with concern for HRS now on albumin.    Maddrey's Discriminant Function - Control Prothrombin 13: 40.67 at 7/2/2025 12:28 AM  Calculated from:  Total Bilirubin: 24.11 mg/dL at 7/2/2025 12:28 AM  Prothrombin Time: 16.6 seconds at 7/1/2025  8:26 PM  MDF = (4.6 * (PT - Control PT)) + Bilirubin     MELD 3.0: 36 at 7/3/2025 11:03 AM  MELD-Na: 35 at 7/3/2025 11:03 AM  Calculated from:  Serum Creatinine: 3.93 mg/dL (Using max of 3 mg/dL) at 7/3/2025 11:03 AM  Serum Sodium: 134 mmol/L at 7/3/2025 11:03 AM  Total Bilirubin: 25 mg/dL at 7/3/2025 11:03 AM  Serum Albumin: 4.5 g/dL (Using  max of 3.5 g/dL) at 7/3/2025 11:03 AM  INR(ratio): 1.33 at 7/1/2025  8:26 PM  Age at listing (hypothetical): 55 years  Sex: Male at 7/3/2025 11:03 AM    # Elevated Liver Chemistries - Alcohol hepatitis:   Monitor and trend LFTs daily along with INR  Avoid hepatotoxic medications, strongly encourage complete alcohol cessation  Additional pain and symptom management per primary team  Given progression of elevated LFT's and failure to improve with associated decline in renal function, discussed case with [facility] who has agree to accept patient for liver transplant evaluation.      # Ascites with ZENAIDA/CKD:   Paracentesis completed 7/3/2025 with removal of 5270 cc of clear yellow fluid negative for SBP  Nephrology following - appreciate recommendations  Cr worsening -currently albumin, previously on improvement with HRS treatment  Monitor and trend serum creatinine and electrolytes daily, monitor urine output     # History of Coffee Ground Emesis and Melena:  Status post EGD 6/13/2025 with evidence of grade D esophagitis; history of gastric dieulafoy lesion as well 2/2025  Continue on Protonix 40 mg twice daily by mouth  Monitor and trend hemoglobin, transfuse for goal greater than 7  Alert GI team of any concern for recurrent GI bleeding     # Transplant  Patient with history of alcoholic hepatitis and now concern for HRS which may potentiate need for transplant.   Petkimberly from 6/18 negative - patient reports abstinence since first admission with concern for alc hep  Has adequate family social support  Ambulates with rolling walker, able to ambulate at home on his own  No previous DUIs or legal trouble associated with drinking.  No previous stays in alcohol rehabilitation  He has multiple hospitalizations each year in the setting of alcohol use with encephalopathy, hypoglycemia as well as more recently alcoholic hepatitis.   Case previously reviewed with Dr. Yola Paige, hepatology, who stated although HRS less  likely with elevated urine sodium, it would be reasonable to reach out to [facility] to discuss for potential transfer for transplant evaluation, but only if patient understanding of severity of condition and agreeable to transfer.  Discussed with patient at bedside today who is agreeable to transfer and transplant evaluation.  Reviewed with patient's brother via telephone and then with both patient and brother in person at bedside.  Reviewed current liver and kidney disease and reason for transfer.  Advised that on arrival, patient would undergo multiple tests to evaluate for liver transplant candidacy and determination of timing of possible liver transplant.  We did discuss that although patient is being transferred for evaluation, this does not guarantee liver transplant.  Answered all questions to the best of my ability, and advised that our team would remain available to them even after transfer for any additional questions or concerns.

## 2025-07-03 NOTE — PROGRESS NOTES
NEPHROLOGY PROGRESS NOTE   Wes Araujo 55 y.o. male MRN: [id number]  Unit/Bed#: E4 -01 Encounter: [id number]  Reason for Consult: ZENAIDA CKD    ASSESSMENT AND PLAN:  Assessment & Plan  Acute renal failure superimposed on stage 4 chronic kidney disease (HCC)  ZENAIDA on CKD stage IV, overall fluctuating creatinine, baseline creatinine 1.8-2.1 since March 2025, episodes of ZENAIDA during recent hospitalizations noted, creatinine fluctuated mid to high twos in May 2025 and more recently during hospitalization 3.2-3.4 in June 2025  -Creatinine 3.9 on admission now slightly improved 3.7 yesterday.  Patient initially refused labs today but now agreeable.  Follow results for BMP  -ZENAIDA could be in the setting of decreased effective intravascular volume with multiple hyaline casts on UA in the recent past, incomplete recovery from recent ZENAIDA, component of HRS remains differential versus overall progression of CKD given uncontrolled diabetes  -Recent UA in June 2025 showed 3+ proteinuria, no hematuria with multiple hyaline cast.  -Follow results for repeat UA with microscopy, urine electrolytes.    -No hydro on CT scan.  Renal ultrasound in June 2025 showed normal-sized kidneys, somewhat echogenic renal parenchyma in both kidneys  - Undergoing paracentesis by IR today.  Agree with IV albumin post paracentesis.  -Currently avoiding diuretics.  Hyperkalemia  Severe hyperkalemia  -Potassium level improving to 5.4 yesterday with medical management.  Patient now agreeable for repeat labs for today, follow results for BMP.  -Suspect in the setting of advanced renal failure, uncontrolled hyperglycemia  - Status post [facility] yesterday.  Strict low potassium diet  Primary hypertension  BP remains above goal.     Home regimen: Amlodipine 5 mg twice daily, Coreg 6.25 mg twice daily, hydralazine 25 mg every 8  Not currently on any antihypertensives.  Will restart amlodipine 5 mg twice daily home dose with holding parameter.  Avoid relative  hypotension and perturbations of blood pressure  Alcoholic hepatitis with ascites  CT findings with ascites as above  Status post paracentesis on 7/3/2025 by IR today.  GI following.  Plan noted for transfer to [facility] for transplant evaluation.  Alcohol-induced chronic pancreatitis (HCC)  CT with sequela of chronic pancreatitis and parenchymal atrophy, mild main ductal dilation also noted  On Creon per primary team  Low bicarbonate level  Low bicarb, check VBG to evaluate for metabolic acidosis versus compensation for respiratory alkalosis.  Patient initially refused labs today but now agreeable.  -Currently on sodium bicarb supplement 2 tablet p.o. 3 times daily.  -Bicarb level 16  Persistent proteinuria  Proteinuria, UACR 1.6 g in May 2025  -Suspect secondary to underlying long-term uncontrolled diabetes, last hemoglobin A1c 9.6 in May 2025 although this has ranged from 8-11 going back to 2018  - Noted serum albumin 4.1     Discussed above plan in detail with primary team regarding monitoring BMP, IV albumin post paracentesis and they agree with above recommendations.      SUBJECTIVE:  Patient seen and examined at bedside.  Denies nausea or vomiting.    OBJECTIVE:  Current Weight: Weight - Scale: 56.2 kg (123 lb 14.4 oz)  Vitals:    07/03/25 0956   BP: (!) 171/88   Pulse: 74   Resp: 12   Temp:    SpO2: 100%       Intake/Output Summary (Last 24 hours) at 7/3/2025 1046  Last data filed at 7/3/2025 0954  Gross per 24 hour   Intake --   Output 5250 ml   Net -5250 ml     Wt Readings from Last 3 Encounters:   07/02/25 56.2 kg (123 lb 14.4 oz)   06/12/25 64.5 kg (142 lb 3.2 oz)   06/06/25 61.2 kg (134 lb 14.7 oz)     Temp Readings from Last 3 Encounters:   07/03/25 97.8 °F (36.6 °C) (Temporal)   06/20/25 99.4 °F (37.4 °C)   06/06/25 98.5 °F (36.9 °C) (Oral)     BP Readings from Last 3 Encounters:   07/03/25 (!) 171/88   06/20/25 144/72   06/06/25 134/65     Pulse Readings from Last 3 Encounters:   07/03/25 74    06/20/25 74   06/06/25 75        Physical Examination:  Eyes: Mild conjunctival pallor present  Neck: No obvious lymphadenopathy appreciated  Respiratory: Bilateral air entry present  CVS: No significant edema  GI: Soft, nondistended and improved post paracentesis  CNS: Acute, alert  Skin: No new rash  Musculoskeletal: No obviously gross deformity noted    Medications:  Current Medications[1]    Laboratory Results:  Results from last 7 days   Lab Units 07/02/25  0901 07/02/25  0301 07/02/25  0028 07/01/25  2236 07/01/25  2026   WBC Thousand/uL  --   --  17.38*  --  18.89*   HEMOGLOBIN g/dL  --   --  10.9*  --  10.0*   HEMATOCRIT %  --   --  32.2*  --  29.3*   PLATELETS Thousands/uL  --   --  276  --  277   SODIUM mmol/L  --  133* 128*  --  130*   POTASSIUM mmol/L 5.4* 5.6*  5.6* 8.7* 7.0* 6.9*   CHLORIDE mmol/L  --  107 105  --  103   CO2 mmol/L  --  16* 14*  --  17*   BUN mg/dL  --  52* 51*  --  55*   CREATININE mg/dL  --  3.71* 3.93*  --  3.91*   CALCIUM mg/dL  --  9.6 9.8  --  10.1   MAGNESIUM mg/dL  --   --  2.7  --   --    PHOSPHORUS mg/dL  --   --  2.8  --   --        CT chest abdomen pelvis wo contrast   Final Result by Yemi Betancourt MD (07/02 0018)   1.  Trace right pleural effusion, decreased since the prior study. Bibasilar linear parenchymal scarring and dependent atelectasis noted. No lobar airspace consolidation. Aeration of the lower lung zones appear improved compared to the prior study. Upper    to mid lung fields are well aerated and clear.   2.  Sequela of chronic pancreatitis again seen.   3.  Mild wall thickening of multiple abdominal/pelvic small bowel loops as well as portions of the colon likely reactive in the setting of ascites. Can not exclude possibility of mild diffuse enterocolitis. No evidence of bowel obstruction, appendicitis,    or free air. Large volume of abdominal/pelvic ascites again noted, similar compared to the prior exam.   4.  Mild diffuse subcutaneous edema throughout  "the chest, abdomen, and pelvis. Additional ancillary findings detailed above.            Workstation performed: [id number]         IR INPATIENT Paracentesis    (Results Pending)       Portions of the record may have been created with voice recognition software. Occasional wrong word or \"sound a like\" substitutions may have occurred due to the inherent limitations of voice recognition software. Read the chart carefully and recognize, using context, where substitutions have occurred.       [1]   Current Facility-Administered Medications:     acetaminophen (TYLENOL) tablet 650 mg, 650 mg, Oral, Q6H PRN, ARIANE Prieto    albumin human (FLEXBUMIN) 25 % injection 12.5 g, 12.5 g, Intravenous, Q8H, Stephani Ambron, DO, Last Rate: 0 mL/hr at 07/02/25 2130, 12.5 g at 07/03/25 0520    aluminum-magnesium hydroxide-simethicone (MAALOX) oral suspension 30 mL, 30 mL, Oral, Q6H PRN, ARIANE Prieto    cefTRIAXone (ROCEPHIN) 1,000 mg in dextrose 5 % 50 mL IVPB, 1,000 mg, Intravenous, Q24H, Stephani Ambron, DO, Last Rate: 100 mL/hr at 07/02/25 1320, 1,000 mg at 07/02/25 1320    folic acid (FOLVITE) tablet 1 mg, 1 mg, Oral, Daily, ARIANE Prieto, 1 mg at 07/03/25 0807    hydrALAZINE (APRESOLINE) injection 5 mg, 5 mg, Intravenous, Q6H PRN, Stephani Ambron, DO    HYDROmorphone HCl (DILAUDID) injection 0.2 mg, 0.2 mg, Intravenous, Q8H PRN, Stephani Ambron, DO, 0.2 mg at 07/02/25 2040    insulin lispro (HumALOG/ADMELOG) 100 units/mL subcutaneous injection 1-5 Units, 1-5 Units, Subcutaneous, 4x Daily (AC & HS), 2 Units at 07/03/25 0807 **AND** Fingerstick Glucose (POCT), , , 4x Daily AC and at bedtime, ARIANE Prieto    ondansetron (ZOFRAN) injection 4 mg, 4 mg, Intravenous, Q6H PRN, ARIANE Prieto    pancrelipase (Lip-Prot-Amyl) (CREON) delayed release capsule 24,000 Units, 24,000 Units, Oral, TID With Meals, ARIANE Prieto, 24,000 Units at 07/03/25 " 0807    sodium bicarbonate tablet 1,300 mg, 1,300 mg, Oral, TID after meals, ARIANE Prieto, 1,300 mg at 07/03/25 0807    thiamine tablet 50 mg, 50 mg, Oral, Daily, ARIANE Prieto, 50 mg at 07/03/25 0807

## 2025-07-03 NOTE — ASSESSMENT & PLAN NOTE
Proteinuria, UACR 1.6 g in May 2025  -Suspect secondary to underlying long-term uncontrolled diabetes, last hemoglobin A1c 9.6 in May 2025 although this has ranged from 8-11 going back to 2018  - Noted serum albumin 4.1

## 2025-07-03 NOTE — PROGRESS NOTES
Progress Note - Gastroenterology   Name: Wes Araujo 55 y.o. male I MRN: [id number]  Unit/Bed#: E4 -01 I Date of Admission: 7/1/2025   Date of Service: 7/3/2025 I Hospital Day: 1    Assessment & Plan  Alcoholic hepatitis with ascites  55-year-old male with a past medical history of hypertension, diabetes mellitus type 2, chronic pancreatitis secondary to chronic alcohol use and recent admission for alc hep with likely ZENAIDA on CKD (creatinine 3.01 at discharge during previous hospitalization, previously 0.8-1 in 2024 but more recently 1.7-2) who presented to [facility] on 6/12 for concern of recurrent abdominal distension with associated pain in the setting of constipation.   Labs on presentation significant for continued improvement in AST/ALT/ALP since last admission, however, bilirubin continues to increase.  INR 1.18 on presentation (peaked during prior admission at 5.53 with dramatic improvement after Vitamin K). Kidney function has been progressively worsening since last hospitalization with new peak Cr 3.93 with severe hyperkalemia.     Patient previously underwent serologic liver workup negative for alternative cause of liver injury with elevations thought to be 2/2 alc hep in setting of some degree of underlying fibrosis.  Patient was treated with conservative management without steroids due to low Maddrey's score.  Patient was recommended to follow-up with gastroenterology but now presenting with recurrent abdominal distention/pain. Last hospitalization 6/12-6/20 further complicated by continued abdominal pain likely in the setting of constipation as well as worsening ZENAIDA with concern for HRS now on albumin.    Maddrey's Discriminant Function - Control Prothrombin 13: 40.67 at 7/2/2025 12:28 AM  Calculated from:  Total Bilirubin: 24.11 mg/dL at 7/2/2025 12:28 AM  Prothrombin Time: 16.6 seconds at 7/1/2025  8:26 PM  MDF = (4.6 * (PT - Control PT)) + Bilirubin     MELD 3.0: 36 at 7/3/2025 11:03 AM  MELD-Na:  35 at 7/3/2025 11:03 AM  Calculated from:  Serum Creatinine: 3.93 mg/dL (Using max of 3 mg/dL) at 7/3/2025 11:03 AM  Serum Sodium: 134 mmol/L at 7/3/2025 11:03 AM  Total Bilirubin: 25 mg/dL at 7/3/2025 11:03 AM  Serum Albumin: 4.5 g/dL (Using max of 3.5 g/dL) at 7/3/2025 11:03 AM  INR(ratio): 1.33 at 7/1/2025  8:26 PM  Age at listing (hypothetical): 55 years  Sex: Male at 7/3/2025 11:03 AM    # Elevated Liver Chemistries - Alcohol hepatitis:   Monitor and trend LFTs daily along with INR  Avoid hepatotoxic medications, strongly encourage complete alcohol cessation  Additional pain and symptom management per primary team  Given progression of elevated LFT's and failure to improve with associated decline in renal function, discussed case with [facility] who has agree to accept patient for liver transplant evaluation.      # Ascites with ZENAIDA/CKD:   Paracentesis completed 7/3/2025 with removal of 5270 cc of clear yellow fluid negative for SBP  Nephrology following - appreciate recommendations  Cr worsening -currently albumin, previously on improvement with HRS treatment  Monitor and trend serum creatinine and electrolytes daily, monitor urine output     # History of Coffee Ground Emesis and Melena:  Status post EGD 6/13/2025 with evidence of grade D esophagitis; history of gastric dieulafoy lesion as well 2/2025  Continue on Protonix 40 mg twice daily by mouth  Monitor and trend hemoglobin, transfuse for goal greater than 7  Alert GI team of any concern for recurrent GI bleeding     # Transplant  Patient with history of alcoholic hepatitis and now concern for HRS which may potentiate need for transplant.   Peth from 6/18 negative - patient reports abstinence since first admission with concern for alc hep  Has adequate family social support  Ambulates with rolling walker, able to ambulate at home on his own  No previous DUIs or legal trouble associated with drinking.  No previous stays in alcohol rehabilitation  He  has multiple hospitalizations each year in the setting of alcohol use with encephalopathy, hypoglycemia as well as more recently alcoholic hepatitis.   Case previously reviewed with Dr. Yola Paige, hepatology, who stated although HRS less likely with elevated urine sodium, it would be reasonable to reach out to [facility] to discuss for potential transfer for transplant evaluation, but only if patient understanding of severity of condition and agreeable to transfer.  Discussed with patient at bedside today who is agreeable to transfer and transplant evaluation.  Reviewed with patient's brother via telephone and then with both patient and brother in person at bedside.  Reviewed current liver and kidney disease and reason for transfer.  Advised that on arrival, patient would undergo multiple tests to evaluate for liver transplant candidacy and determination of timing of possible liver transplant.  We did discuss that although patient is being transferred for evaluation, this does not guarantee liver transplant.  Answered all questions to the best of my ability, and advised that our team would remain available to them even after transfer for any additional questions or concerns.   Acute renal failure superimposed on stage 4 chronic kidney disease (HCC)  Lab Results   Component Value Date    EGFR 16 07/03/2025    EGFR 17 07/02/2025    EGFR 16 07/02/2025    CREATININE 3.93 (H) 07/03/2025    CREATININE 3.71 (H) 07/02/2025    CREATININE 3.93 (H) 07/02/2025     Alcohol-induced chronic pancreatitis (HCC)    Alcohol abuse  Sober since admission for alcoholic hepatitis (almost 2 months per his report. Peth negative 6/18        Subjective   Patient feeling better today after paracentesis with removal of 5 L of fluid.  He is tolerating his lunch at time of my evaluation.    Objective :  Temp:  [97.5 °F (36.4 °C)-97.8 °F (36.6 °C)] 97.8 °F (36.6 °C)  HR:  [72-75] 74  BP: (166-187)/(85-94) 187/94  Resp:  [12-20] 12  SpO2:   [94 %-100 %] 100 %  O2 Device: None (Room air)    Physical Exam  Vitals and nursing note reviewed.   Constitutional:       General: He is not in acute distress.     Appearance: Normal appearance. He is ill-appearing.   HENT:      Head: Normocephalic and atraumatic.      Nose: Nose normal.      Mouth/Throat:      Mouth: Mucous membranes are moist.     Eyes:      General: Scleral icterus present.      Conjunctiva/sclera: Conjunctivae normal.       Cardiovascular:      Rate and Rhythm: Normal rate and regular rhythm.   Pulmonary:      Effort: Pulmonary effort is normal. No respiratory distress.   Abdominal:      General: Bowel sounds are normal. There is distension.      Palpations: Abdomen is soft. There is no mass.      Tenderness: There is no abdominal tenderness. There is no guarding or rebound.      Hernia: No hernia is present.     Musculoskeletal:         General: Normal range of motion.      Cervical back: Normal range of motion.      Right lower leg: No edema.      Left lower leg: No edema.     Skin:     General: Skin is warm and dry.      Coloration: Skin is jaundiced.      Findings: Bruising present.     Neurological:      General: No focal deficit present.      Mental Status: He is alert and oriented to person, place, and time.      Motor: Weakness present.     Psychiatric:         Mood and Affect: Mood normal.         Behavior: Behavior normal.           Lab Results: I have reviewed the following results:

## 2025-07-03 NOTE — PLAN OF CARE
Problem: PAIN - ADULT  Goal: Verbalizes/displays adequate comfort level or baseline comfort level  Description: Interventions:  - Encourage patient to monitor pain and request assistance  - Assess pain using appropriate pain scale  - Administer analgesics as ordered based on type and severity of pain and evaluate response  - Implement non-pharmacological measures as appropriate and evaluate response  - Consider cultural and social influences on pain and pain management  - Notify physician/advanced practitioner if interventions unsuccessful or patient reports new pain  - Educate patient/family on pain management process including their role and importance of  reporting pain   - Provide non-pharmacologic/complimentary pain relief interventions  Outcome: Progressing     Problem: INFECTION - ADULT  Goal: Absence or prevention of progression during hospitalization  Description: INTERVENTIONS:  - Assess and monitor for signs and symptoms of infection  - Monitor lab/diagnostic results  - Monitor all insertion sites, i.e. indwelling lines, tubes, and drains  - Monitor endotracheal if appropriate and nasal secretions for changes in amount and color  - [place] appropriate cooling/warming therapies per order  - Administer medications as ordered  - Instruct and encourage patient and family to use good hand hygiene technique  - Identify and instruct in appropriate isolation precautions for identified infection/condition  Outcome: Progressing     Problem: SAFETY ADULT  Goal: Patient will remain free of falls  Description: INTERVENTIONS:  - Educate patient/family on patient safety including physical limitations  - Instruct patient to call for assistance with activity   - Consider consulting OT/PT to assist with strengthening/mobility based on AM PAC & JH-HLM score  - Consult OT/PT to assist with strengthening/mobility   - Keep Call bell within reach  - Keep bed low and locked with side rails adjusted as appropriate  - Keep  care items and personal belongings within reach  - Initiate and maintain comfort rounds  - Make Fall Risk Sign visible to staff  - Offer Toileting every 2 Hours, in advance of need  - Initiate/Maintain bed alarm  - Obtain necessary fall risk management equipment: alarms  - Apply yellow socks and bracelet for high fall risk patients  - Consider moving patient to room near nurses station  Outcome: Progressing     Problem: SAFETY ADULT  Goal: Maintain or return to baseline ADL function  Description: INTERVENTIONS:  -  Assess patient's ability to carry out ADLs; assess patient's baseline for ADL function and identify physical deficits which impact ability to perform ADLs (bathing, care of mouth/teeth, toileting, grooming, dressing, etc.)  - Assess/evaluate cause of self-care deficits   - Assess range of motion  - Assess patient's mobility; develop plan if impaired  - Assess patient's need for assistive devices and provide as appropriate  - Encourage maximum independence but intervene and supervise when necessary  - Involve family in performance of ADLs  - Assess for home care needs following discharge   - Consider OT consult to assist with ADL evaluation and planning for discharge  - Provide patient education as appropriate  - Monitor functional capacity and physical performance, use of AM PAC & JH-HLM   - Monitor gait, balance and fatigue with ambulation    Outcome: Progressing     Problem: DISCHARGE PLANNING  Goal: Discharge to home or other facility with appropriate resources  Description: INTERVENTIONS:  - Identify barriers to discharge w/patient and caregiver  - Arrange for needed discharge resources and transportation as appropriate  - Identify discharge learning needs (meds, wound care, etc.)  - Arrange for interpretive services to assist at discharge as needed  - Refer to Case Management Department for coordinating discharge planning if the patient needs post-hospital services based on physician/advanced  practitioner order or complex needs related to functional status, cognitive ability, or social support system  Outcome: Progressing     Problem: Knowledge Deficit  Goal: Patient/family/caregiver demonstrates understanding of disease process, treatment plan, medications, and discharge instructions  Description: Complete learning assessment and assess knowledge base.  Interventions:  - Provide teaching at level of understanding  - Provide teaching via preferred learning methods  Outcome: Progressing     Problem: CARDIOVASCULAR - ADULT  Goal: Maintains optimal cardiac output and hemodynamic stability  Description: INTERVENTIONS:  - Monitor I/O, vital signs and rhythm  - Monitor for S/S and trends of decreased cardiac output  - Administer and titrate ordered vasoactive medications to optimize hemodynamic stability  - Assess quality of pulses, skin color and temperature  - Assess for signs of decreased coronary artery perfusion  - Instruct patient to report change in severity of symptoms  Outcome: Progressing  Goal: Absence of cardiac dysrhythmias or at baseline rhythm  Description: INTERVENTIONS:  - Continuous cardiac monitoring, vital signs, obtain 12 lead EKG if ordered  - Administer antiarrhythmic and heart rate control medications as ordered  - Monitor electrolytes and administer replacement therapy as ordered  Outcome: Progressing     Problem: METABOLIC, FLUID AND ELECTROLYTES - ADULT  Goal: Electrolytes maintained within normal limits  Description: INTERVENTIONS:  - Monitor labs and assess patient for signs and symptoms of electrolyte imbalances  - Administer electrolyte replacement as ordered  - Monitor response to electrolyte replacements, including repeat lab results as appropriate  - Instruct patient on fluid and nutrition as appropriate  Outcome: Progressing  Goal: Fluid balance maintained  Description: INTERVENTIONS:  - Monitor labs   - Monitor I/O and WT  - Instruct patient on fluid and nutrition as  appropriate  - Assess for signs & symptoms of volume excess or deficit  Outcome: Progressing  Goal: Glucose maintained within target range  Description: INTERVENTIONS:  - Monitor Blood Glucose as ordered  - Assess for signs and symptoms of hyperglycemia and hypoglycemia  - Administer ordered medications to maintain glucose within target range  - Assess nutritional intake and initiate nutrition service referral as needed  Outcome: Progressing     Problem: GASTROINTESTINAL - ADULT  Goal: Maintains adequate nutritional intake  Description: INTERVENTIONS:  - Monitor percentage of each meal consumed  - Identify factors contributing to decreased intake, treat as appropriate  - Assist with meals as needed  - Monitor I&O, weight, and lab values if indicated  - Obtain nutrition services referral as needed  Outcome: Progressing     Problem: Nutrition/Hydration-ADULT  Goal: Nutrient/Hydration intake appropriate for improving, restoring or maintaining nutritional needs  Description: Monitor and assess patient's nutrition/hydration status for malnutrition. Collaborate with interdisciplinary team and initiate plan and interventions as ordered.  Monitor patient's weight and dietary intake as ordered or per policy. Utilize nutrition screening tool and intervene as necessary. Determine patient's food preferences and provide high-protein, high-caloric foods as appropriate.     INTERVENTIONS:  - Monitor oral intake, urinary output, labs, and treatment plans  - Assess nutrition and hydration status and recommend course of action  - Evaluate amount of meals eaten  - Assist patient with eating if necessary   - Allow adequate time for meals  - Recommend/ encourage appropriate diets, oral nutritional supplements, and vitamin/mineral supplements  - Order, calculate, and assess calorie counts as needed  - Recommend, monitor, and adjust tube feedings and TPN/PPN based on assessed needs  - Assess need for intravenous fluids  - Provide specific  nutrition/hydration education as appropriate  - Include patient/family/caregiver in decisions related to nutrition  Outcome: Progressing

## 2025-07-03 NOTE — ASSESSMENT & PLAN NOTE
Potassium in the emergency room 6.9-->7.0  Hemolyzed, yet treated with albuterol, calcium gluconate, and insulin/dextrose  Repeat potassium 5.4  Continue low potassium diet  Awaiting labs this am

## 2025-07-03 NOTE — UTILIZATION REVIEW
Initial Clinical Review    Admission: Date/Time/Statement:   Admission Orders (From admission, onward)       Ordered        07/02/25 0047  Inpatient Admission  Once                          Orders Placed This Encounter   Procedures    Inpatient Admission     Standing Status:   Standing     Number of Occurrences:   1     Level of Care:   Med Surg [16]     Estimated length of stay:   More than 2 Midnights     Certification:   I certify that inpatient services are medically necessary for this patient for a duration of greater than two midnights. See H&P and MD Progress Notes for additional information about the patient's course of treatment.     ED Arrival Information       Expected   -    Arrival   7/1/2025 19:07    Acuity   Urgent              Means of arrival   Walk-In    Escorted by   Family Member    Service   Hospitalist    Admission type   Emergency              Arrival complaint   abdominal pain             Chief Complaint   Patient presents with    Bloated     Patient reports increase stomach size due to chronic hx of liver conditions as well as decrease appetite.        Initial Presentation: 55 y.o. male presents to the ED from home with c/o recurrent abdominal distension and inability to eat due to distention. PMH: HTN, IDDM, chronic pancreatitis d/t ETOH use - not drinking currently, recent admit for alcoholic hepatitis - left AMA.  In the ED VS stable,  no c/o pain.  Treated with IV Zofran x 3, Humulin insulin x 2, IV D50 x 1, albuterol neb, IV Calcium gluconate, IV Hydralazine.  Labs - elevated K 6.9 to 8.7, leukocytosis, elevated INR, glucose, AST, alk phos T Bili 24.76, BUN/CR.  ECG - NSR.  Imaging - decreased size trace R pleural effusion, dependent atelectasis, chronic pancreatitis, large vol abd/pelvic ascites, no bowel obstruction, appendicitis, mild diffuse sq edema throughout the chest, abdomen, and pelvis. On exam Conjunctival icterus, abd distention, jaundiced skin, no abd TTP, A&O x 3.   Admitted to INPATIENT status with Alcoholic hepatitis with ascites,  ARF on CKD, ETOH induced chronic pancreatitis, Hyperkalemia,HTN - PLAN: MELD 36, GI consult, likely paracentesis, Nephrology consult, restart Bicarb tabs TID, Creon, trend K, thiamine/folic acid tabs, SSI Cover, home insulin regimen, coreg, Hydralazine, Amlodipine, SLP eval.     Anticipated Length of Stay/Certification Statement: Patient will be admitted on an inpatient basis with an anticipated length of stay of greater than 2 midnights secondary to new chronic kidney disease, liver failure with ascites and metabolic derangements.     7/2 GI Consult - presents with significant worsening of his kidney function as well as worsening LFTs. Large volume ascites on imaging. On exam jaundiced, alert x 3, distended abd, TTP, no BLE edema, meld 36 - poor prognosis, continue IV antibiotics, paracentesis to r/o SBP, K  mgmt, transfer to transplant facility [facility]. Consider OP liver Bx,     7/2 Nephrology Consult - ZENAIDA on CKD, severe hyperkalemia, low bicarb, HTN, ETOH hepatitis w/ recurrent ascites, chroinc pancreatitis - improving creat, follow urine electrolytes, bladder scan likely to be inaccurate given ascites, give Lokelma 10 g 1 dose today. Strict low potassium diet, bicarb supplements, restart Amlodipine, holding parameters with Coreg. Check VBG,     Date: 7/3   Day 2:   Alcoholic hepatitis with ascites,  ARF on CKD, ETOH induced chronic pancreatitis, Hyperkalemia,HTN - continued HTN  - add PRN IV Hydralazine, continue IV antibiotics, IR Paracentesis pending today.  Accepted at [facility].  On exam scleral icterus, abd distention, tenderness, jaundice.      ++++++++++++  7/3 IR Procedure Note Paracentesis - Findings: right sided paracentesis. 5270cc clear yellow fluid removed.   ++++++++++++    ED Treatment-Medication Administration from 07/01/2025 1907 to 07/02/2025 0127         Date/Time Order Dose Route Action     07/01/2025 2026 ondansetron  (ZOFRAN) injection 4 mg 4 mg Intravenous Given     07/01/2025 2147 ondansetron (ZOFRAN) injection 4 mg 4 mg Intravenous Given     07/01/2025 2328 insulin regular (HumuLIN R,NovoLIN R) injection 6.45 Units 6.45 Units Intravenous Given     07/01/2025 2325 dextrose 50 % IV solution 25 mL 25 mL Intravenous Given     07/01/2025 2323 albuterol inhalation solution 10 mg 10 mg Nebulization Given     07/01/2025 2329 calcium gluconate 1 g in sodium chloride 0.9% 50 mL (premix) 1 g Intravenous New Bag     07/01/2025 2339 ondansetron (ZOFRAN) injection 4 mg 4 mg Intravenous Given     07/01/2025 2357 hydrALAZINE (APRESOLINE) injection 5 mg 5 mg Intravenous Given     07/02/2025 0048 insulin lispro (HumALOG/ADMELOG) 100 units/mL subcutaneous injection 1-5 Units 2 Units Subcutaneous Given            Scheduled Medications:  Albumin 25%, 12.5 g, Intravenous, Q8H  cefTRIAXone, 1,000 mg, Intravenous, Q24H  folic acid, 1 mg, Oral, Daily  insulin lispro, 1-5 Units, Subcutaneous, 4x Daily (AC & HS)  pancrelipase (Lip-Prot-Amyl), 24,000 Units, Oral, TID With Meals  sodium bicarbonate, 1,300 mg, Oral, TID after meals  thiamine, 50 mg, Oral, Daily      Continuous IV Infusions:     PRN Meds:  acetaminophen, 650 mg, Oral, Q6H PRN  aluminum-magnesium hydroxide-simethicone, 30 mL, Oral, Q6H PRN  hydrALAZINE, 5 mg, Intravenous, Q6H PRN  HYDROmorphone, 0.2 mg, Intravenous, Q8H PRN - x 1 7/2  ondansetron, 4 mg, Intravenous, Q6H PRN      ED Triage Vitals   Temperature Pulse Respirations Blood Pressure SpO2 Pain Score   07/01/25 2012 07/01/25 1913 07/01/25 1913 07/01/25 1913 07/01/25 1913 07/02/25 0208   97.7 °F (36.5 °C) 72 16 149/79 99 % No Pain     Weight (last 2 days)       Date/Time Weight    07/02/25 0134 56.2 (123.9)            Vital Signs (last 3 days)       Date/Time Temp Pulse Resp BP MAP (mmHg) SpO2 O2 Device Patient Position - Orthostatic VS [place] Coma Scale Score Pain    07/03/25 09:56:16 -- 74 12 171/88 -- 100 % -- -- -- --     07/03/25 0800 -- -- -- -- -- -- -- -- 15 No Pain    07/03/25 0709 97.8 °F (36.6 °C) 75 20 174/94 127 95 % None (Room air) Lying -- --    07/02/25 2306 97.7 °F (36.5 °C) 72 20 166/92 120 94 % None (Room air) Lying -- --    07/02/25 2040 -- -- -- -- -- -- -- -- 15 8    07/02/25 1436 97.5 °F (36.4 °C) 75 20 169/85 120 98 % None (Room air) Lying -- --    07/02/25 1100 98 °F (36.7 °C) 73 20 139/81 105 98 % None (Room air) Lying -- --    07/02/25 0800 -- -- -- -- -- -- -- -- 15 No Pain    07/02/25 0727 97.8 °F (36.6 °C) 75 20 154/77 105 98 % None (Room air) Lying -- --    07/02/25 0334 97.7 °F (36.5 °C) 80 20 174/94 126 100 % None (Room air) Lying -- --    07/02/25 0208 -- -- -- -- -- -- None (Room air) -- 15 No Pain    07/02/25 0134 98 °F (36.7 °C) 81 21 174/86 126 100 % None (Room air) Lying -- --    07/02/25 0030 -- 79 21 142/81 107 100 % -- -- -- --    07/01/25 2345 -- 77 20 173/93 128 100 % -- -- -- --    07/01/25 2335 -- 78 17 173/93 -- -- -- -- -- --    07/01/25 2012 97.7 °F (36.5 °C) -- -- -- -- -- -- -- -- --    07/01/25 1913 -- 72 16 149/79 106 99 % None (Room air) Lying -- --              Pertinent Labs/Diagnostic Test Results:   Radiology:  CT chest abdomen pelvis wo contrast   Final Interpretation by Yemi Betancourt MD (07/02 0018)   1.  Trace right pleural effusion, decreased since the prior study. Bibasilar linear parenchymal scarring and dependent atelectasis noted. No lobar airspace consolidation. Aeration of the lower lung zones appear improved compared to the prior study. Upper    to mid lung fields are well aerated and clear.   2.  Sequela of chronic pancreatitis again seen.   3.  Mild wall thickening of multiple abdominal/pelvic small bowel loops as well as portions of the colon likely reactive in the setting of ascites. Can not exclude possibility of mild diffuse enterocolitis. No evidence of bowel obstruction, appendicitis,    or free air. Large volume of abdominal/pelvic ascites again noted, similar  compared to the prior exam.   4.  Mild diffuse subcutaneous edema throughout the chest, abdomen, and pelvis. Additional ancillary findings detailed above.            Workstation performed: [id number]         IR INPATIENT Paracentesis    (Results Pending)     Cardiology:  ECG 12 lead   Final Result by Ana Paula Valdez MD (07/02 0720)   Normal sinus rhythm   Normal ECG   When compared with ECG of 23-May-2025 18:25,   T wave inversion now evident in Inferior leads   Nonspecific T wave abnormality no longer evident in Anterior leads   Confirmed by Ana Paula Valdez ([id number]) on 7/2/2025 7:20:09 AM      ECG 12 lead   Final Result by Will Guajardo DO (07/02 1232)   Normal sinus rhythm   Poor anterior R wave progression is noted   When compared with ECG of 01-Jul-2025 21:22, (unconfirmed)   Nonspecific T wave abnormality now evident in Lateral leads   Confirmed by Will Guajardo ([id number]) on 7/2/2025 12:32:29 PM      ECG 12 lead   Final Result by Will Guajardo DO (07/02 1232)   Normal sinus rhythm   Poor anterior R wave progression is noted   Abnormal ECG   No previous ECGs available   Confirmed by Will Guajardo ([id number]) on 7/2/2025 12:32:18 PM        GI:  No orders to display           Results from last 7 days   Lab Units 07/02/25  0028 07/01/25 2026   WBC Thousand/uL 17.38* 18.89*   HEMOGLOBIN g/dL 10.9* 10.0*   HEMATOCRIT % 32.2* 29.3*   PLATELETS Thousands/uL 276 277   TOTAL NEUT ABS Thousands/µL 13.72* 15.00*         Results from last 7 days   Lab Units 07/02/25  0901 07/02/25  0301 07/02/25  0028 07/01/25  2236 07/01/25 2026   SODIUM mmol/L  --  133* 128*  --  130*   POTASSIUM mmol/L 5.4* 5.6*  5.6* 8.7* 7.0* 6.9*   CHLORIDE mmol/L  --  107 105  --  103   CO2 mmol/L  --  16* 14*  --  17*   ANION GAP mmol/L  --  10 9  --  10   BUN mg/dL  --  52* 51*  --  55*   CREATININE mg/dL  --  3.71* 3.93*  --  3.91*   EGFR ml/min/1.73sq m  --  17 16  --  16   CALCIUM mg/dL  --  9.6 9.8  --  10.1   MAGNESIUM mg/dL  --   --  2.7  --   --     PHOSPHORUS mg/dL  --   --  2.8  --   --      Results from last 7 days   Lab Units 07/02/25  0028 07/01/25 2123 07/01/25 2026   AST U/L 72*  --  60*   ALT U/L 33  --  33   ALK PHOS U/L 288*  --  275*   TOTAL PROTEIN g/dL 7.5  --  7.0   ALBUMIN g/dL 4.1  --  4.1   TOTAL BILIRUBIN mg/dL 24.11*  --  24.76*   AMMONIA umol/L  --  43  --      Results from last 7 days   Lab Units 07/03/25  1020 07/03/25  0708 07/02/25  2111 07/02/25  1620 07/02/25  1059 07/02/25  0726 07/02/25  0035 07/01/25  2316   POC GLUCOSE mg/dl 220* 264* 190* 132 168* 210* 231* 232*     Results from last 7 days   Lab Units 07/02/25  0301 07/02/25 0028 07/01/25 2026   GLUCOSE RANDOM mg/dL 208* 252* 245*             Beta- Hydroxybutyrate   Date Value Ref Range Status   05/23/2025 0.21 0.20 - 0.60 mmol/L Final   04/26/2025 <0.05 0.02 - 0.27 mmol/L Final   02/05/2025 <0.05 0.02 - 0.27 mmol/L Final                      Results from last 7 days   Lab Units 07/01/25 2236 07/01/25 2026   HS TNI 0HR ng/L  --  10   HS TNI 2HR ng/L 11  --    HSTNI D2 ng/L 1  --          Results from last 7 days   Lab Units 07/01/25 2026   PROTIME seconds 16.6*   INR  1.33*   PTT seconds 33         Results from last 7 days   Lab Units 07/01/25 2026   LIPASE u/L <6*           Results from last 7 days   Lab Units 07/01/25 2026   ETHANOL LVL mg/dL <10                     Results from last 7 days   Lab Units 07/03/25  0906   WBC FLUID /ul 196               Past Medical History[1]  Present on Admission:   Hyperkalemia   Alcohol abuse   Alcoholic hepatitis with ascites   Paroxysmal A-fib (HCC)   Primary hypertension   Acute renal failure superimposed on stage 4 chronic kidney disease (HCC)   Alcohol-induced chronic pancreatitis (HCC)   Low bicarbonate level      Admitting Diagnosis: Chronic pancreatitis (HCC) [K86.1]  Bloated abdomen [R14.0]  Chronic renal disease [N18.9]  CKD (chronic kidney disease) stage 4, GFR 15-29 ml/min (HCC) [N18.4]  Ascites due to alcoholic  hepatitis [K70.11]  Age/Sex: 55 y.o. male    Network Utilization Review Department  ATTENTION: Please call with any questions or concerns to [phone] and carefully listen to the prompts so that you are directed to the right person. All voicemails are confidential.   For Discharge needs, contact Care Management DC Support Team at [phone] opt. [phone]  Send all requests for admission clinical reviews, approved or denied determinations and any other requests to dedicated fax number below belonging to the campus where the patient is receiving treatment. List of dedicated fax numbers for the Facilities:  FACILITY NAME UR FAX NUMBER   ADMISSION DENIALS (Administrative/Medical Necessity) [phone]   DISCHARGE SUPPORT TEAM (NETWORK) [phone]   PARENT CHILD HEALTH (Maternity/NICU/Pediatrics) [phone]   [facility] [phone]   [facility] [phone]   [facility] [phone]   [facility] [phone]   [facility] [phone]   [facility] [phone]   [facility] [phone]   [facility] [phone]   [facility] [phone]   [facility] [phone]   [facility] [phone]   [facility] [phone]              [1]   Past Medical History:  Diagnosis Date    Alcohol abuse     Chronic pancreatitis (HCC)     Diabetes mellitus (HCC)     Type 2    Non compliance w medication regimen     Pancreatitis     Paroxysmal A-fib (HCC)     Thrombocytopenia (HCC)

## 2025-07-03 NOTE — SPEECH THERAPY NOTE
Speech Language/Pathology  Speech/Language Pathology  Assessment    Patient Name: Wes Araujo  Today's Date: 7/3/2025     Problem List  Active Problems:    Primary hypertension    Type 2 diabetes mellitus with hyperglycemia, with long-term current use of insulin (HCC)    Alcoholic hepatitis with ascites    Paroxysmal A-fib (HCC)    Acute renal failure superimposed on stage 4 chronic kidney disease (HCC)    Alcohol-induced chronic pancreatitis (HCC)    Low bicarbonate level    Hyperkalemia    Alcohol abuse    Moderate protein-calorie malnutrition (HCC)    Persistent proteinuria    Past Medical History  Past Medical History[1]  Past Surgical History  Past Surgical History[2]       Bedside Swallow Evaluation:    Summary:  Pt presented w/ limited intake at breakfast but no observed oropharyngeal difficulties w/ sausage, eggs, toast, thin liquids. Ate ~ 70%. Mastication, bolus control, formation, and transfer were WNL. Swallows prompt. No cough or wet vocal quality. Oral mech exam WNL. Natural dentition. Given comorbidities, suspect pt may have GI/esophageal issues. GI* following. Oraopharynegal stages WNL.     Recommendations:  Diet:Regular  Liquid:thin  Meds:as tolerated. Nurse has no concerns.   Supervision:prn  Positioning:Upright  Pt to take PO/Meds only when fully alert and upright.   Oral care  Aspiration precautions  Reflux precautions  Eval only, No f/u tx indicated. Please reconsult if status changes/specific ST needs arise.     Consider consult w/:  GI  nutrition    H&P/Admit info/ as per pertinent provider notes: (PMH noted above)  Attending Attestation::  I supervised the Advanced Practitioner on 7/2/2025.? I performed, in its entirety, the assessment and plan of the visit.  I agree with the Advanced Practitioner's note with the following additions/exceptions:  Pt seen and examined. He is having abd pain that is diffuse. He feels like something is falling out of his abd in the front. He has increased pain  on pressing on his abd  Abd- soft distended +ttp that is diffuse +bs  Abd pain- concern for sbp vs large volume ascites. Started ceftriaxone. Consulted ir for paracentesis. Started albumin  Ronaldo-/ckd4- appreciate nephrology. Likely hepatorenal syndrome. Will add albumin. Check bmp in am.   Chief Complaint: Abdominal distention  Wes Araujo is a 55-year-old male with a past medical history of hypertension, diabetes mellitus type 2, chronic pancreatitis secondary to chronic alcohol use and recent admission for alc hep with likely RONALDO on CKD who presented to [facility] on 7/1 for concern of recurrent abdominal distension and inability to eat due to distention.  Patient recently admitted to [facility] (6/12 to 6/20) for the same and ultimately left AMA.  Currently his vital signs are stable however he has significant ascites and distention so will be admitted for further treatment.    Special Studies: (refer to reports for complete details)  7/1/25 CT chest/abd/pelvis  IMPRESSION:  1.  Trace right pleural effusion, decreased since the prior study. Bibasilar linear parenchymal scarring and dependent atelectasis noted. No lobar airspace consolidation. Aeration of the lower lung zones appear improved compared to the prior study. Upper   to mid lung fields are well aerated and clear.  2.  Sequela of chronic pancreatitis again seen.  3.  Mild wall thickening of multiple abdominal/pelvic small bowel loops as well as portions of the colon likely reactive in the setting of ascites. Can not exclude possibility of mild diffuse enterocolitis. No evidence of bowel obstruction, appendicitis,   or free air. Large volume of abdominal/pelvic ascites again noted, similar compared to the prior exam.  4.  Mild diffuse subcutaneous edema throughout the chest, abdomen, and pelvis. Additional ancillary findings detailed above.    7/3 paracentesis (report not yet complete)    EGD:  Exam End: 6/13/2025 15:33   Performing physicians:  Gwen Boone DO; Scotty Brito MD   [facility], [facility]  Findings    No esophageal or gastric varices  Grade D esophagitis with mucosal breaks measuring 5 mm or more, continuous between folds, covering 75% or more of the circumference  Non-obstructing Schatzki ring in the GE junction  Z-line 42 cm from the incisors  One sessile and benign-appearing polyp measuring smaller than 5 mm in the fundus of the stomach. Not removed in setting of GI bleeding  The cardia, body of the stomach, incisura and antrum appeared normal.  The duodenal bulb, 1st part of the duodenum and 2nd part of the duodenum appeared normal.  Recommendation and Follow-Up    Return to floor  Resume normal diet  Transition to PO PPI twice daily  Suspect coffee-ground emesis in setting of severe esophagitis  Can start Ceftriaxone x5 days for SBP prophylaxis in the setting of GI bleed       Sodium 135-147mmol/L  133 7/2         Procalcitonin<=0.25ng/ml    WBC  4.31-10.16 Thousand/uL   17.38  7/2     Previous MBS:  None in epic    Patient's goal:none stated    Did the pt report pain? no  If yes, was nursing notified/was it addressed?    Reason for consult:  R/o aspiration  Determine safest and least restrictive diet    Precautions:  Hand hygeine    BMI: 18.30  Food Allergies:  nkfa   Current Diet:  regular   Premorbid diet:  regular   O2 requirement:  RA   Social/Prior living  Lives w/ parents   Voice/Speech:  Clear in Belarusian   Follows commands:  When he wants to   Cognitive status: Alert, makes needs known     Oral [facility] exam:  Dentition: natural  Lips (VII):+  Tongue (XII):+  Mandible (V):+  Secretion management:+    Esophageal stage:  GI following    Results d/w:  Pt, nursing         [1]   Past Medical History:  Diagnosis Date    Alcohol abuse     Chronic pancreatitis (HCC)     Diabetes mellitus (HCC)     Type 2    Non compliance w medication regimen     Pancreatitis     Paroxysmal  A-fib (HCC)     Thrombocytopenia (HCC)    [2]   Past Surgical History:  Procedure Laterality Date    INCISION AND DRAINAGE OF WOUND N/A 8/16/2020    Procedure: INCISION AND DRAINAGE (I&D) HEAD/FACE;  Surgeon: Estrada Walton DMD;  Location: [facility];  Service: Maxillofacial    IR BIOPSY BONE MARROW  2/7/2019    IR PARACENTESIS  6/3/2025    IR PARACENTESIS  6/13/2025    IR PARACENTESIS  6/18/2025    REMOVAL OF IMPACTED TOOTH - COMPLETELY BONY N/A 8/16/2020    Procedure: EXTRACTION TEETH MULTIPLE #2, 3;  Surgeon: Estrada Walton DMD;  Location: [facility];  Service: Maxillofacial      English

## 2025-07-03 NOTE — ASSESSMENT & PLAN NOTE
Severe hyperkalemia  -Potassium level improving to 5.4 yesterday with medical management.  Patient now agreeable for repeat labs for today, follow results for BMP.  -Suspect in the setting of advanced renal failure, uncontrolled hyperglycemia  - Status post [facility] yesterday.  Strict low potassium diet

## 2025-07-03 NOTE — ASSESSMENT & PLAN NOTE
BP remains above goal.     Home regimen: Amlodipine 5 mg twice daily, Coreg 6.25 mg twice daily, hydralazine 25 mg every 8  Not currently on any antihypertensives.  Will restart amlodipine 5 mg twice daily home dose with holding parameter.  Avoid relative hypotension and perturbations of blood pressure

## 2025-07-03 NOTE — ASSESSMENT & PLAN NOTE
Noted again on CAT scan  Restart Creon 24,000 units 3 times a day with meals  Pt has been abstinent from alcohol for at least 3 months

## 2025-07-03 NOTE — ASSESSMENT & PLAN NOTE
Wes Araujo is a 55-year-old male with a past medical history of hypertension, diabetes mellitus type 2, chronic pancreatitis secondary to chronic alcohol use and recent admission for alc hep with likely ZENAIDA on CKD who presented to [facility] on 7/1 for concern of recurrent abdominal distension and inability to eat due to distention.  Patient recently admitted to [facility] (6/12 to 6/20) for the same and ultimately left AMA.     GI consulted, appreciate the assistance  Last paracentesis 6/18, 5700 mL of ascites removed  Ir consulted for paracentesis- concern for sbp  Continue ceftriaxone  CT abdomen/pelvis showing: Sequela of chronic pancreatitis again seen. Mild wall thickening of multiple abdominal/pelvic small bowel loops as well as portions of the colon likely reactive in the setting of ascites. Can not exclude possibility of mild diffuse enterocolitis. No evidence of bowel obstruction, appendicitis, or free air. Large volume of abdominal/pelvic ascites again noted, similar compared to the prior exam.  Spoke with gi. Pt accepted at [facility] for transplant evaluation. Working on transfer     Lab Results   Component Value Date    ALT 33 07/02/2025    AST 72 (H) 07/02/2025    GGT 1,128 (H) 05/24/2025    ALKPHOS 288 (H) 07/02/2025    TBILI 24.11 (H) 07/02/2025          Pamela's Discriminant Function - Control Prothrombin 13: 40.67 at 7/2/2025 12:28 AM  Calculated from:  Total Bilirubin: 24.11 mg/dL at 7/2/2025 12:28 AM  Prothrombin Time: 16.6 seconds at 7/1/2025  8:26 PM  MDF = (4.6 * (PT - Control PT)) + Bilirubin     MELD 3.0: 36 at 7/2/2025  3:01 AM  MELD-Na: 35 at 7/2/2025  3:01 AM  Calculated from:  Serum Creatinine: 3.71 mg/dL (Using max of 3 mg/dL) at 7/2/2025  3:01 AM  Serum Sodium: 133 mmol/L at 7/2/2025  3:01 AM  Total Bilirubin: 24.11 mg/dL at 7/2/2025 12:28 AM  Serum Albumin: 4.1 g/dL (Using max of 3.5 g/dL) at 7/2/2025 12:28 AM  INR(ratio): 1.33 at 7/1/2025  8:26 PM  Age at listing  (hypothetical): 55 years  Sex: Male at 7/2/2025  3:01 AM

## 2025-07-03 NOTE — DISCHARGE INSTRUCTIONS

## 2025-07-03 NOTE — TRANSPORTATION MEDICAL NECESSITY
"Section I - General Information    Name of Patient: Wes Araujo                 : 1970    Medicare #: [id number]  Transport Date: 25 (PCS is valid for round trips on this date and for all repetitive trips in the 60-day range as noted below.)  Origin: [place] [id number]                                                         Destination: [facility]  Is the pt's stay covered under Medicare Part A (PPS/DRG)   []     Closest appropriate facility? If no, why is transport to more distant facility required? Yes  If hospice pt, is this transport related to pt's terminal illness? N/A       Section II - Medical Necessity Questionnaire  Ambulance transportation is medically necessary only if other means of transport are contraindicated or would be potentially harmful to the patient. To meet this requirement, the patient must either be \"bed confined\" or suffer from a condition such that transport by means other than ambulance is contraindicated by the patient's condition. The following questions must be answered by the medical professional signing below for this form to be valid:    1)  Describe the MEDICAL CONDITION (physical and/or mental) of this patient AT THE TIME OF AMBULANCE TRANSPORT that requires the patient to be transported in an ambulance and why transport by other means is contraindicated by the patient's condition:Patient requires transfer for liver transplant evaluation.     2) Is the patient \"bed confined\" as defined below?     Yes  To be \"be confined\" the patient must satisfy all three of the following conditions: (1) unable to get up from bed without Assistance; AND (2) unable to ambulate; AND (3) unable to sit in a chair or wheelchair.    3) Can this patient safely be transported by car or wheelchair van (i.e., seated during transport without a medical attendant or monitoring)?   No    4) In addition to completing questions 1-3 above, please check any of " the following conditions that apply*:   *Note: supporting documentation for any boxes checked must be maintained in the patient's medical records.  If hosp-hosp transfer, describe services needed at 2nd facility not available at 1st facility?   Hemodynamic monitoring required en route  Cardiac monitoring required en route       Section III - Signature of Physician or Healthcare Professional  I certify that the above information is true and correct based on my evaluation of this patient, and represent that the patient requires transport by ambulance and that other forms of transport are contraindicated. I understand that this information will be used by the Centers for Medicare and Medicaid Services (CMS) to support the determination of medical necessity for ambulance services, and I represent that I have personal knowledge of the patient's condition at time of transport.    []  If this box is checked, I also certify that the patient is physically or mentally incapable of signing the ambulance service's claim and that the institution with which I am affiliated has furnished care, services, or assistance to the patient.    My signature below is made on behalf of the patient pursuant to 42 CFR §424.36(b)(4). In accordance with 42 CFR §424.37, the specific reason(s) that the patient is physically or mentally incapable of signing the claim form is as follows: Patient able to sign.      Signature of Physician* or Healthcare Professional MELODY Montes  Signature Date 07/03/25 (For scheduled repetitive transports, this form is not valid for transports performed more than 60 days after this date)    Printed Name & Credentials of Physician or Healthcare Professional (MD, DO, RN, etc.) MELODY Montes  *Form must be signed by patient's attending physician for scheduled, repetitive transports. For non-repetitive, unscheduled ambulance transports, if unable to obtain the signature of the attending physician, any of the  following may sign (choose appropriate option below)  [] Physician Assistant []  Clinical Nurse Specialist []  Registered Nurse  []  Nurse Practitioner  [x] Discharge Planner

## 2025-07-03 NOTE — PROGRESS NOTES
Progress Note - Hospitalist   Name: Wes Araujo 55 y.o. male I MRN: [id number]  Unit/Bed#: E4 -01 I Date of Admission: 7/1/2025   Date of Service: 7/3/2025 I Hospital Day: 1    Assessment & Plan  Primary hypertension  Blood pressure currently 173/93  continue Coreg, hydralazine and amlodipine at previous doses  Routine vital signs  Add prn hydralazine   Type 2 diabetes mellitus with hyperglycemia, with long-term current use of insulin (HCC)  Since patient left AMA recently he has not taken any of his insulin or oral medications  Continue 70/30 insulin 8 units twice daily- will adjust insulin as needed   Sliding scale insulin  ACHS glucose checks  Diabetic diet  Hypoglycemia protocol    Lab Results   Component Value Date    HGBA1C 9.6 (H) 05/26/2025     Alcoholic hepatitis with ascites  Wes Araujo is a 55-year-old male with a past medical history of hypertension, diabetes mellitus type 2, chronic pancreatitis secondary to chronic alcohol use and recent admission for alc hep with likely ZENAIDA on CKD who presented to [facility] on 7/1 for concern of recurrent abdominal distension and inability to eat due to distention.  Patient recently admitted to [facility] (6/12 to 6/20) for the same and ultimately left AMA.     GI consulted, appreciate the assistance  Last paracentesis 6/18, 5700 mL of ascites removed  Ir consulted for paracentesis- concern for sbp  Continue ceftriaxone  CT abdomen/pelvis showing: Sequela of chronic pancreatitis again seen. Mild wall thickening of multiple abdominal/pelvic small bowel loops as well as portions of the colon likely reactive in the setting of ascites. Can not exclude possibility of mild diffuse enterocolitis. No evidence of bowel obstruction, appendicitis, or free air. Large volume of abdominal/pelvic ascites again noted, similar compared to the prior exam.  Spoke with gi. Pt accepted at [facility] for transplant evaluation. Working on transfer     Lab Results   Component  Value Date    ALT 33 07/02/2025    AST 72 (H) 07/02/2025    GGT 1,128 (H) 05/24/2025    ALKPHOS 288 (H) 07/02/2025    TBILI 24.11 (H) 07/02/2025          Pamela's Discriminant Function - Control Prothrombin 13: 40.67 at 7/2/2025 12:28 AM  Calculated from:  Total Bilirubin: 24.11 mg/dL at 7/2/2025 12:28 AM  Prothrombin Time: 16.6 seconds at 7/1/2025  8:26 PM  MDF = (4.6 * (PT - Control PT)) + Bilirubin     MELD 3.0: 36 at 7/2/2025  3:01 AM  MELD-Na: 35 at 7/2/2025  3:01 AM  Calculated from:  Serum Creatinine: 3.71 mg/dL (Using max of 3 mg/dL) at 7/2/2025  3:01 AM  Serum Sodium: 133 mmol/L at 7/2/2025  3:01 AM  Total Bilirubin: 24.11 mg/dL at 7/2/2025 12:28 AM  Serum Albumin: 4.1 g/dL (Using max of 3.5 g/dL) at 7/2/2025 12:28 AM  INR(ratio): 1.33 at 7/1/2025  8:26 PM  Age at listing (hypothetical): 55 years  Sex: Male at 7/2/2025  3:01 AM    Paroxysmal A-fib (HCC)  History of paroxysmal atrial fibrillation  Restart Coreg 6.25 mg twice daily  Not on anticoagulation  Acute renal failure superimposed on stage 4 chronic kidney disease (HCC)  Avoid nephrotoxins and hypotension  Nephrology consulted  Restart bicarb tablets 3 times daily  Awaiting labs from this am     Lab Results   Component Value Date    EGFR 17 07/02/2025    EGFR 16 07/02/2025    EGFR 16 07/01/2025    CREATININE 3.71 (H) 07/02/2025    CREATININE 3.93 (H) 07/02/2025    CREATININE 3.91 (H) 07/01/2025     Alcohol-induced chronic pancreatitis (HCC)  Noted again on CAT scan  Restart Creon 24,000 units 3 times a day with meals  Pt has been abstinent from alcohol for at least 3 months      Hyperkalemia  Potassium in the emergency room 6.9-->7.0  Hemolyzed, yet treated with albuterol, calcium gluconate, and insulin/dextrose  Repeat potassium 5.4  Continue low potassium diet  Awaiting labs this am  Alcohol abuse  H/o  Patient reports that he is not currently drinking and has not drank since prior to his previous admission  Nonetheless, monitor for signs of  withdrawal  Continue thiamine/folate supplementation  Moderate protein-calorie malnutrition (HCC)  Malnutrition Findings:   Adult Malnutrition type: Acute illness  Adult Degree of Malnutrition: Malnutrition of moderate degree  Malnutrition Characteristics: Fat loss, Muscle loss, Inadequate energy                  360 Statement: Severe calorie protein malnutrition in the setting of acute illness r/t inadequte PO intake in setting of abdominal distention/ pain as evidenced by:  moderate body fat (triceps, ribcage area) and muscle mass depletions (temporal wasting, protruding clavicles) energy intake less than 50% compared to estimated needs>5 days; Treated with oral supplements    BMI Findings:           Body mass index is 18.3 kg/m².       VTE Pharmacologic Prophylaxis: VTE Score: 1 early ambulation     Mobility:   Basic Mobility Inpatient Raw Score: 14  -[facility] Goal: 4: Move to chair/commode  -[facility] Achieved: 2: Bed activities/Dependent transfer      Patient Centered Rounds: discussed with his nurse      Education and Discussions with Family / Patient: discussed with his brother     Current Length of Stay: 1 day(s)  Current Patient Status: Inpatient     Discharge Plan: accepted at [place] await transfer    Code Status: Level 1 - Full Code    Subjective   [id number] Discussed transfer with pt. He agrees to transfer. He denies further abd pain but is still very distended. No f/c no cp no sob    Objective :  Temp:  [97.5 °F (36.4 °C)-98 °F (36.7 °C)] 97.8 °F (36.6 °C)  HR:  [72-75] 75  BP: (139-174)/(81-94) 174/94  Resp:  [20] 20  SpO2:  [94 %-98 %] 95 %  O2 Device: None (Room air)    Body mass index is 18.3 kg/m².     Input and Output Summary (last 24 hours):   No intake or output data in the 24 hours ending 07/03/25 0865    Physical Exam  Constitutional:       Appearance: Normal appearance.   HENT:      Head: Normocephalic and atraumatic.     Eyes:      General: Scleral icterus  present.      Extraocular Movements: Extraocular movements intact.      Pupils: Pupils are equal, round, and reactive to light.       Cardiovascular:      Rate and Rhythm: Normal rate and regular rhythm.      Heart sounds: No murmur heard.     No friction rub. No gallop.   Pulmonary:      Effort: Pulmonary effort is normal. No respiratory distress.      Breath sounds: Normal breath sounds. No wheezing, rhonchi or rales.   Abdominal:      General: There is distension.      Palpations: Abdomen is soft.      Tenderness: There is abdominal tenderness. There is no guarding or rebound.     Skin:     Coloration: Skin is jaundiced.     Neurological:      Mental Status: He is alert and oriented to person, place, and time.       Lines/Drains:        Lab Results: I have reviewed the following results:   Results from last 7 days   Lab Units 07/02/25  0028   WBC Thousand/uL 17.38*   HEMOGLOBIN g/dL 10.9*   HEMATOCRIT % 32.2*   PLATELETS Thousands/uL 276   SEGS PCT % 79*   LYMPHO PCT % 11*   MONO PCT % 8   EOS PCT % 1     Results from last 7 days   Lab Units 07/02/25  0901 07/02/25  0301 07/02/25  0028   SODIUM mmol/L  --  133* 128*   POTASSIUM mmol/L 5.4* 5.6*  5.6* 8.7*   CHLORIDE mmol/L  --  107 105   CO2 mmol/L  --  16* 14*   BUN mg/dL  --  52* 51*   CREATININE mg/dL  --  3.71* 3.93*   ANION GAP mmol/L  --  10 9   CALCIUM mg/dL  --  9.6 9.8   ALBUMIN g/dL  --   --  4.1   TOTAL BILIRUBIN mg/dL  --   --  24.11*   ALK PHOS U/L  --   --  288*   ALT U/L  --   --  33   AST U/L  --   --  72*   GLUCOSE RANDOM mg/dL  --  208* 252*     Results from last 7 days   Lab Units 07/01/25  2026   INR  1.33*     Results from last 7 days   Lab Units 07/03/25  0708 07/02/25  2111 07/02/25  1620 07/02/25  1059 07/02/25  0726 07/02/25  0035 07/01/25  2316   POC GLUCOSE mg/dl 264* 190* 132 168* 210* 231* 232*               Recent Cultures (last 7 days):         Imaging Results Review: No pertinent imaging studies reviewed.  Other Study Results  Review: No additional pertinent studies reviewed.    Last 24 Hours Medication List:     Current Facility-Administered Medications:     acetaminophen (TYLENOL) tablet 650 mg, Q6H PRN    albumin human (FLEXBUMIN) 25 % injection 12.5 g, Q8H, Last Rate: 0 g (07/02/25 2130)    aluminum-magnesium hydroxide-simethicone (MAALOX) oral suspension 30 mL, Q6H PRN    cefTRIAXone (ROCEPHIN) 1,000 mg in dextrose 5 % 50 mL IVPB, Q24H, Last Rate: 1,000 mg (07/02/25 1320)    folic acid (FOLVITE) tablet 1 mg, Daily    hydrALAZINE (APRESOLINE) injection 5 mg, Q6H PRN    HYDROmorphone HCl (DILAUDID) injection 0.2 mg, Q8H PRN    insulin lispro (HumALOG/ADMELOG) 100 units/mL subcutaneous injection 1-5 Units, 4x Daily (AC & HS) **AND** Fingerstick Glucose (POCT), 4x Daily AC and at bedtime    ondansetron (ZOFRAN) injection 4 mg, Q6H PRN    pancrelipase (Lip-Prot-Amyl) (CREON) delayed release capsule 24,000 Units, TID With Meals    sodium bicarbonate tablet 1,300 mg, TID after meals    thiamine tablet 50 mg, Daily    Administrative Statements   Today, Patient Was Seen By: Stephani Casanova DO

## 2025-07-03 NOTE — PLAN OF CARE
Problem: PAIN - ADULT  Goal: Verbalizes/displays adequate comfort level or baseline comfort level  Description: Interventions:  - Encourage patient to monitor pain and request assistance  - Assess pain using appropriate pain scale  - Administer analgesics as ordered based on type and severity of pain and evaluate response  - Implement non-pharmacological measures as appropriate and evaluate response  - Consider cultural and social influences on pain and pain management  - Notify physician/advanced practitioner if interventions unsuccessful or patient reports new pain  - Educate patient/family on pain management process including their role and importance of  reporting pain   - Provide non-pharmacologic/complimentary pain relief interventions  Outcome: Progressing     Problem: INFECTION - ADULT  Goal: Absence or prevention of progression during hospitalization  Description: INTERVENTIONS:  - Assess and monitor for signs and symptoms of infection  - Monitor lab/diagnostic results  - Monitor all insertion sites, i.e. indwelling lines, tubes, and drains  - Monitor endotracheal if appropriate and nasal secretions for changes in amount and color  - [place] appropriate cooling/warming therapies per order  - Administer medications as ordered  - Instruct and encourage patient and family to use good hand hygiene technique  - Identify and instruct in appropriate isolation precautions for identified infection/condition  Outcome: Progressing     Problem: SAFETY ADULT  Goal: Patient will remain free of falls  Description: INTERVENTIONS:  - Educate patient/family on patient safety including physical limitations  - Instruct patient to call for assistance with activity   - Consider consulting OT/PT to assist with strengthening/mobility based on AM PAC & JH-HLM score  - Consult OT/PT to assist with strengthening/mobility   - Keep Call bell within reach  - Keep bed low and locked with side rails adjusted as appropriate  - Keep  care items and personal belongings within reach  - Initiate and maintain comfort rounds  - Make Fall Risk Sign visible to staff  - Offer Toileting every 2 Hours, in advance of need  - Initiate/Maintain bed alarm  - Obtain necessary fall risk management equipment: alarms  - Apply yellow socks and bracelet for high fall risk patients  - Consider moving patient to room near nurses station  Outcome: Progressing  Goal: Maintain or return to baseline ADL function  Description: INTERVENTIONS:  -  Assess patient's ability to carry out ADLs; assess patient's baseline for ADL function and identify physical deficits which impact ability to perform ADLs (bathing, care of mouth/teeth, toileting, grooming, dressing, etc.)  - Assess/evaluate cause of self-care deficits   - Assess range of motion  - Assess patient's mobility; develop plan if impaired  - Assess patient's need for assistive devices and provide as appropriate  - Encourage maximum independence but intervene and supervise when necessary  - Involve family in performance of ADLs  - Assess for home care needs following discharge   - Consider OT consult to assist with ADL evaluation and planning for discharge  - Provide patient education as appropriate  - Monitor functional capacity and physical performance, use of AM PAC & JH-HLM   - Monitor gait, balance and fatigue with ambulation    Outcome: Progressing  Goal: Maintains/Returns to pre admission functional level  Description: INTERVENTIONS:  - Perform AM-PAC 6 Click Basic Mobility/ Daily Activity assessment daily.  - Set and communicate daily mobility goal to care team and patient/family/caregiver.   - Collaborate with rehabilitation services on mobility goals if consulted  - Perform Range of Motion 2 times a day.  - Reposition patient every 2 hours.  - Dangle patient 2 times a day  - Stand patient 2 times a day  - Ambulate patient 3 times a day  - Out of bed to chair 3 times a day   - Out of bed for meals 3 times a  day  - Out of bed for toileting  - Record patient progress and toleration of activity level   Outcome: Progressing     Problem: DISCHARGE PLANNING  Goal: Discharge to home or other facility with appropriate resources  Description: INTERVENTIONS:  - Identify barriers to discharge w/patient and caregiver  - Arrange for needed discharge resources and transportation as appropriate  - Identify discharge learning needs (meds, wound care, etc.)  - Arrange for interpretive services to assist at discharge as needed  - Refer to Case Management Department for coordinating discharge planning if the patient needs post-hospital services based on physician/advanced practitioner order or complex needs related to functional status, cognitive ability, or social support system  Outcome: Progressing     Problem: Knowledge Deficit  Goal: Patient/family/caregiver demonstrates understanding of disease process, treatment plan, medications, and discharge instructions  Description: Complete learning assessment and assess knowledge base.  Interventions:  - Provide teaching at level of understanding  - Provide teaching via preferred learning methods  Outcome: Progressing     Problem: CARDIOVASCULAR - ADULT  Goal: Maintains optimal cardiac output and hemodynamic stability  Description: INTERVENTIONS:  - Monitor I/O, vital signs and rhythm  - Monitor for S/S and trends of decreased cardiac output  - Administer and titrate ordered vasoactive medications to optimize hemodynamic stability  - Assess quality of pulses, skin color and temperature  - Assess for signs of decreased coronary artery perfusion  - Instruct patient to report change in severity of symptoms  Outcome: Progressing  Goal: Absence of cardiac dysrhythmias or at baseline rhythm  Description: INTERVENTIONS:  - Continuous cardiac monitoring, vital signs, obtain 12 lead EKG if ordered  - Administer antiarrhythmic and heart rate control medications as ordered  - Monitor electrolytes and  administer replacement therapy as ordered  Outcome: Progressing     Problem: METABOLIC, FLUID AND ELECTROLYTES - ADULT  Goal: Electrolytes maintained within normal limits  Description: INTERVENTIONS:  - Monitor labs and assess patient for signs and symptoms of electrolyte imbalances  - Administer electrolyte replacement as ordered  - Monitor response to electrolyte replacements, including repeat lab results as appropriate  - Instruct patient on fluid and nutrition as appropriate  Outcome: Progressing  Goal: Fluid balance maintained  Description: INTERVENTIONS:  - Monitor labs   - Monitor I/O and WT  - Instruct patient on fluid and nutrition as appropriate  - Assess for signs & symptoms of volume excess or deficit  Outcome: Progressing  Goal: Glucose maintained within target range  Description: INTERVENTIONS:  - Monitor Blood Glucose as ordered  - Assess for signs and symptoms of hyperglycemia and hypoglycemia  - Administer ordered medications to maintain glucose within target range  - Assess nutritional intake and initiate nutrition service referral as needed  Outcome: Progressing     Problem: GASTROINTESTINAL - ADULT  Goal: Maintains adequate nutritional intake  Description: INTERVENTIONS:  - Monitor percentage of each meal consumed  - Identify factors contributing to decreased intake, treat as appropriate  - Assist with meals as needed  - Monitor I&O, weight, and lab values if indicated  - Obtain nutrition services referral as needed  Outcome: Progressing     Problem: Nutrition/Hydration-ADULT  Goal: Nutrient/Hydration intake appropriate for improving, restoring or maintaining nutritional needs  Description: Monitor and assess patient's nutrition/hydration status for malnutrition. Collaborate with interdisciplinary team and initiate plan and interventions as ordered.  Monitor patient's weight and dietary intake as ordered or per policy. Utilize nutrition screening tool and intervene as necessary. Determine  patient's food preferences and provide high-protein, high-caloric foods as appropriate.     INTERVENTIONS:  - Monitor oral intake, urinary output, labs, and treatment plans  - Assess nutrition and hydration status and recommend course of action  - Evaluate amount of meals eaten  - Assist patient with eating if necessary   - Allow adequate time for meals  - Recommend/ encourage appropriate diets, oral nutritional supplements, and vitamin/mineral supplements  - Order, calculate, and assess calorie counts as needed  - Recommend, monitor, and adjust tube feedings and TPN/PPN based on assessed needs  - Assess need for intravenous fluids  - Provide specific nutrition/hydration education as appropriate  - Include patient/family/caregiver in decisions related to nutrition  Outcome: Progressing

## 2025-07-03 NOTE — BRIEF OP NOTE (RAD/CATH)
IR Right PARACENTESIS Procedure Note    PATIENT NAME: Wes Araujo  : 1970  MRN: [id number]    Pre-op Diagnosis:   1. Ascites due to alcoholic hepatitis    2. Chronic renal disease    3. Chronic pancreatitis (HCC)    4. CKD (chronic kidney disease) stage 4, GFR 15-29 ml/min (HCC)      Post-op Diagnosis:   1. Ascites due to alcoholic hepatitis    2. Chronic renal disease    3. Chronic pancreatitis (HCC)    4. CKD (chronic kidney disease) stage 4, GFR 15-29 ml/min (HCC)        Provider:  Marcia Dubois PA-C    No qualified resident was available, Resident is only observing    Estimated Blood Loss: minimal    Findings: right sided paracentesis. 5270cc clear yellow fluid removed.     Specimens: sent     Complications:  none immediate    Anesthesia: local    Marcia Dubois PA-C     Date: 7/3/2025  Time: 11:20 AM

## 2025-07-03 NOTE — PROGRESS NOTES
07/03/25 1200   Mandaeism Encounters   Mandaeism Needs Prayer   Sacramental Encounters   Sacrament of Sick-Anointing Anointed

## 2025-07-03 NOTE — ASSESSMENT & PLAN NOTE
Malnutrition Findings:   Adult Malnutrition type: Acute illness  Adult Degree of Malnutrition: Malnutrition of moderate degree  Malnutrition Characteristics: Fat loss, Muscle loss, Inadequate energy                  360 Statement: Severe calorie protein malnutrition in the setting of acute illness r/t inadequte PO intake in setting of abdominal distention/ pain as evidenced by:  moderate body fat (triceps, ribcage area) and muscle mass depletions (temporal wasting, protruding clavicles) energy intake less than 50% compared to estimated needs>5 days; Treated with oral supplements    BMI Findings:           Body mass index is 18.3 kg/m².

## 2025-07-03 NOTE — ASSESSMENT & PLAN NOTE
Blood pressure currently 173/93  continue Coreg, hydralazine and amlodipine at previous doses  Routine vital signs  Add prn hydralazine

## 2025-07-03 NOTE — ASSESSMENT & PLAN NOTE
Avoid nephrotoxins and hypotension  Nephrology consulted  Restart bicarb tablets 3 times daily  Awaiting labs from this am     Lab Results   Component Value Date    EGFR 17 07/02/2025    EGFR 16 07/02/2025    EGFR 16 07/01/2025    CREATININE 3.71 (H) 07/02/2025    CREATININE 3.93 (H) 07/02/2025    CREATININE 3.91 (H) 07/01/2025

## 2025-07-04 PROBLEM — E87.5 HYPERKALEMIA: Status: RESOLVED | Noted: 2025-05-29 | Resolved: 2025-07-04

## 2025-07-04 LAB
ALBUMIN SERPL BCG-MCNC: 3.6 G/DL (ref 3.5–5)
ALP SERPL-CCNC: 212 U/L (ref 34–104)
ALT SERPL W P-5'-P-CCNC: 22 U/L (ref 7–52)
ANION GAP SERPL CALCULATED.3IONS-SCNC: 11 MMOL/L (ref 4–13)
AST SERPL W P-5'-P-CCNC: 42 U/L (ref 13–39)
BILIRUB SERPL-MCNC: 18.52 MG/DL (ref 0.2–1)
BUN SERPL-MCNC: 49 MG/DL (ref 5–25)
CALCIUM SERPL-MCNC: 9.2 MG/DL (ref 8.4–10.2)
CHLORIDE SERPL-SCNC: 103 MMOL/L (ref 96–108)
CO2 SERPL-SCNC: 21 MMOL/L (ref 21–32)
CREAT SERPL-MCNC: 3.94 MG/DL (ref 0.6–1.3)
ERYTHROCYTE [DISTWIDTH] IN BLOOD BY AUTOMATED COUNT: 14.8 % (ref 11.6–15.1)
GFR SERPL CREATININE-BSD FRML MDRD: 16 ML/MIN/1.73SQ M
GLUCOSE SERPL-MCNC: 128 MG/DL (ref 65–140)
GLUCOSE SERPL-MCNC: 157 MG/DL (ref 65–140)
GLUCOSE SERPL-MCNC: 222 MG/DL (ref 65–140)
GLUCOSE SERPL-MCNC: 226 MG/DL (ref 65–140)
GLUCOSE SERPL-MCNC: 234 MG/DL (ref 65–140)
HCT VFR BLD AUTO: 26 % (ref 36.5–49.3)
HGB BLD-MCNC: 9 G/DL (ref 12–17)
MCH RBC QN AUTO: 31.7 PG (ref 26.8–34.3)
MCHC RBC AUTO-ENTMCNC: 34.6 G/DL (ref 31.4–37.4)
MCV RBC AUTO: 92 FL (ref 82–98)
PLATELET # BLD AUTO: 206 THOUSANDS/UL (ref 149–390)
PMV BLD AUTO: 11.8 FL (ref 8.9–12.7)
POTASSIUM SERPL-SCNC: 4.6 MMOL/L (ref 3.5–5.3)
PROT SERPL-MCNC: 5.8 G/DL (ref 6.4–8.4)
RBC # BLD AUTO: 2.84 MILLION/UL (ref 3.88–5.62)
SODIUM SERPL-SCNC: 135 MMOL/L (ref 135–147)
WBC # BLD AUTO: 13.97 THOUSAND/UL (ref 4.31–10.16)

## 2025-07-04 PROCEDURE — 85027 COMPLETE CBC AUTOMATED: CPT | Performed by: INTERNAL MEDICINE

## 2025-07-04 PROCEDURE — 99233 SBSQ HOSP IP/OBS HIGH 50: CPT | Performed by: INTERNAL MEDICINE

## 2025-07-04 PROCEDURE — 99232 SBSQ HOSP IP/OBS MODERATE 35: CPT | Performed by: INTERNAL MEDICINE

## 2025-07-04 PROCEDURE — 80053 COMPREHEN METABOLIC PANEL: CPT | Performed by: INTERNAL MEDICINE

## 2025-07-04 PROCEDURE — 82948 REAGENT STRIP/BLOOD GLUCOSE: CPT

## 2025-07-04 RX ORDER — ALBUMIN (HUMAN) 12.5 G/50ML
12.5 SOLUTION INTRAVENOUS EVERY 8 HOURS
Status: DISCONTINUED | OUTPATIENT
Start: 2025-07-04 | End: 2025-07-05 | Stop reason: HOSPADM

## 2025-07-04 RX ORDER — INSULIN GLARGINE 100 [IU]/ML
5 INJECTION, SOLUTION SUBCUTANEOUS EVERY MORNING
Status: DISCONTINUED | OUTPATIENT
Start: 2025-07-04 | End: 2025-07-05 | Stop reason: HOSPADM

## 2025-07-04 RX ORDER — INSULIN GLARGINE 100 [IU]/ML
5 INJECTION, SOLUTION SUBCUTANEOUS EVERY MORNING
Start: 2025-07-04

## 2025-07-04 RX ORDER — SODIUM BICARBONATE 650 MG/1
1300 TABLET ORAL
Status: DISCONTINUED | OUTPATIENT
Start: 2025-07-04 | End: 2025-07-05

## 2025-07-04 RX ADMIN — SODIUM BICARBONATE 650 MG TABLET 1300 MG: at 16:05

## 2025-07-04 RX ADMIN — INSULIN LISPRO 2 UNITS: 100 INJECTION, SOLUTION INTRAVENOUS; SUBCUTANEOUS at 09:24

## 2025-07-04 RX ADMIN — PANCRELIPASE 24000 UNITS: 120000; 24000; 76000 CAPSULE, DELAYED RELEASE PELLETS ORAL at 16:05

## 2025-07-04 RX ADMIN — PANCRELIPASE 24000 UNITS: 120000; 24000; 76000 CAPSULE, DELAYED RELEASE PELLETS ORAL at 09:27

## 2025-07-04 RX ADMIN — INSULIN GLARGINE 5 UNITS: 100 INJECTION, SOLUTION SUBCUTANEOUS at 12:14

## 2025-07-04 RX ADMIN — ALBUMIN (HUMAN) 12.5 G: 0.25 INJECTION, SOLUTION INTRAVENOUS at 21:55

## 2025-07-04 RX ADMIN — INSULIN LISPRO 1 UNITS: 100 INJECTION, SOLUTION INTRAVENOUS; SUBCUTANEOUS at 16:05

## 2025-07-04 RX ADMIN — THIAMINE HCL TAB 100 MG 50 MG: 100 TAB at 09:27

## 2025-07-04 RX ADMIN — INSULIN LISPRO 2 UNITS: 100 INJECTION, SOLUTION INTRAVENOUS; SUBCUTANEOUS at 12:14

## 2025-07-04 RX ADMIN — PANCRELIPASE 24000 UNITS: 120000; 24000; 76000 CAPSULE, DELAYED RELEASE PELLETS ORAL at 12:14

## 2025-07-04 RX ADMIN — ALBUMIN (HUMAN) 12.5 G: 0.25 INJECTION, SOLUTION INTRAVENOUS at 05:38

## 2025-07-04 RX ADMIN — SODIUM BICARBONATE 650 MG TABLET 1300 MG: at 09:26

## 2025-07-04 RX ADMIN — SODIUM BICARBONATE 650 MG TABLET 1300 MG: at 12:14

## 2025-07-04 RX ADMIN — ALBUMIN (HUMAN) 12.5 G: 0.25 INJECTION, SOLUTION INTRAVENOUS at 14:03

## 2025-07-04 RX ADMIN — AMLODIPINE BESYLATE 5 MG: 5 TABLET ORAL at 09:26

## 2025-07-04 RX ADMIN — CEFTRIAXONE 1000 MG: 10 INJECTION, POWDER, FOR SOLUTION INTRAVENOUS at 12:54

## 2025-07-04 RX ADMIN — FOLIC ACID 1 MG: 1 TABLET ORAL at 09:26

## 2025-07-04 NOTE — ASSESSMENT & PLAN NOTE
Avoid nephrotoxins and hypotension  Appreciate nephrology recommendations  S/p albumin   Restart bicarb tablets 3 times daily  Lab Results   Component Value Date    EGFR 16 07/04/2025    EGFR 16 07/03/2025    EGFR 17 07/02/2025    CREATININE 3.94 (H) 07/04/2025    CREATININE 3.93 (H) 07/03/2025    CREATININE 3.71 (H) 07/02/2025

## 2025-07-04 NOTE — ASSESSMENT & PLAN NOTE
Potassium in the emergency room 6.9-->7.0  Hemolyzed, yet treated with albuterol, calcium gluconate, and insulin/dextrose  Repeat potassium 5.4  Continue low potassium diet  Has since resolved

## 2025-07-04 NOTE — ASSESSMENT & PLAN NOTE
55-year-old male with a past medical history of hypertension, diabetes mellitus type 2, chronic pancreatitis secondary to chronic alcohol use and recent admission for alc hep with likely ZENAIDA on CKD (creatinine 3.01 at discharge during previous hospitalization, previously 0.8-1 in 2024 but more recently 1.7-2) who presented to [facility] on 6/12 for concern of recurrent abdominal distension with associated pain in the setting of constipation.   Labs on presentation significant for continued improvement in AST/ALT/ALP since last admission, however, bilirubin continues to increase.  INR 1.18 on presentation (peaked during prior admission at 5.53 with dramatic improvement after Vitamin K). Kidney function has been progressively worsening since last hospitalization with new peak Cr 3.93 with severe hyperkalemia.     Patient previously underwent serologic liver workup negative for alternative cause of liver injury with elevations thought to be 2/2 alc hep in setting of some degree of underlying fibrosis.  Patient was treated with conservative management without steroids due to low Maddrey's score.  Patient was recommended to follow-up with gastroenterology but now presenting with recurrent abdominal distention/pain. Last hospitalization 6/12-6/20 further complicated by continued abdominal pain likely in the setting of constipation as well as worsening ZENAIDA with concern for HRS now on albumin.    Maddrey's Discriminant Function - Control Prothrombin 13: 40.67 at 7/2/2025 12:28 AM  Calculated from:  Total Bilirubin: 24.11 mg/dL at 7/2/2025 12:28 AM  Prothrombin Time: 16.6 seconds at 7/1/2025  8:26 PM  MDF = (4.6 * (PT - Control PT)) + Bilirubin     MELD 3.0: 36 at 7/3/2025 11:03 AM  MELD-Na: 35 at 7/3/2025 11:03 AM  Calculated from:  Serum Creatinine: 3.93 mg/dL (Using max of 3 mg/dL) at 7/3/2025 11:03 AM  Serum Sodium: 134 mmol/L at 7/3/2025 11:03 AM  Total Bilirubin: 25 mg/dL at 7/3/2025 11:03 AM  Serum Albumin: 4.5 g/dL (Using  max of 3.5 g/dL) at 7/3/2025 11:03 AM  INR(ratio): 1.33 at 7/1/2025  8:26 PM  Age at listing (hypothetical): 55 years  Sex: Male at 7/3/2025 11:03 AM    # Elevated Liver Chemistries - Alcohol hepatitis:   Monitor and trend LFTs daily along with INR  Avoid hepatotoxic medications, strongly encourage complete alcohol cessation  Additional pain and symptom management per primary team  Given progression of elevated LFT's and failure to improve with associated decline in renal function, discussed case with [facility] who has agree to accept patient for liver transplant evaluation.      # Ascites with ZENAIDA/CKD:   Paracentesis completed 7/3/2025 with removal of 5270 cc of clear yellow fluid negative for SBP  Nephrology following - appreciate recommendations  Cr worsening -currently albumin, previously on improvement with HRS treatment  Monitor and trend serum creatinine and electrolytes daily, monitor urine output     # History of Coffee Ground Emesis and Melena:  Status post EGD 6/13/2025 with evidence of grade D esophagitis; history of gastric dieulafoy lesion as well 2/2025  Continue on Protonix 40 mg twice daily by mouth  Monitor and trend hemoglobin, transfuse for goal greater than 7  Alert GI team of any concern for recurrent GI bleeding     # Transplant  Patient with history of alcoholic hepatitis and now concern for HRS which may potentiate need for transplant.   Petkimberly from 6/18 negative - patient reports abstinence since first admission with concern for alc hep  Has adequate family social support  Ambulates with rolling walker, able to ambulate at home on his own  No previous DUIs or legal trouble associated with drinking.  No previous stays in alcohol rehabilitation  He has multiple hospitalizations each year in the setting of alcohol use with encephalopathy, hypoglycemia as well as more recently alcoholic hepatitis.   Case previously reviewed with Dr. Yola Paige, hepatology, who stated although HRS less  likely with elevated urine sodium, it would be reasonable to reach out to [facility] to discuss for potential transfer for transplant evaluation, but only if patient understanding of severity of condition and agreeable to transfer.  Discussed with patient at bedside today who is agreeable to transfer and transplant evaluation.  Reviewed with patient's brother via telephone and then with both patient and brother in person at bedside.  Reviewed current liver and kidney disease and reason for transfer.  Advised that on arrival, patient would undergo multiple tests to evaluate for liver transplant candidacy and determination of timing of possible liver transplant.  We did discuss that although patient is being transferred for evaluation, this does not guarantee liver transplant.  Answered all questions to the best of my ability, and advised that our team would remain available to them even after transfer for any additional questions or concerns.

## 2025-07-04 NOTE — PROGRESS NOTES
Progress Note - Nephrology   Name: Wes Araujo 55 y.o. male I MRN: [id number]  Unit/Bed#: E4 -01 I Date of Admission: 7/1/2025   Date of Service: 7/4/2025 I Hospital Day: 2     Assessment & Plan  Acute renal failure superimposed on stage 4 chronic kidney disease (HCC)  ZENAIDA on CKD stage IV, overall fluctuating creatinine, baseline creatinine 1.8-2.1 since March 2025, episodes of ZENAIDA during recent hospitalizations noted, creatinine fluctuated mid to high twos in May 2025 and more recently during hospitalization 3.2-3.4 in June 2025  -Renal function stable creatinine plateaued at 3.9 mg/dL  -Awaiting transfer to [facility]  -ZENAIDA could be in the setting of decreased effective intravascular volume with multiple hyaline casts on UA in the recent past, incomplete recovery from recent ZENAIDA, component of HRS remains differential versus overall progression of CKD given uncontrolled diabetes  -Recent UA in June 2025 showed 3+ proteinuria, no hematuria with multiple hyaline cast.  -Follow results for repeat UA with microscopy, urine electrolytes.    -No hydro on CT scan.  Renal ultrasound in June 2025 showed normal-sized kidneys, somewhat echogenic renal parenchyma in both kidneys  -Status post paracentesis  -Check Cystatin C tomorrow  - Hold diuretics  -Plan for further colloid expansion  Hyperkalemia (Resolved: 7/4/2025)  - Potassium 4.6  -Continue low potassium diet  -Avoid lactone at this time  Primary hypertension  Currently not hypotensive.  Continue antihypertensive regimen  Coller expansion with IV albumin  Alcoholic hepatitis with ascites  CT findings with ascites as above  Status post paracentesis on 7/3/2025 by IR today.  GI following.  Plan noted for transfer to [facility] for transplant evaluation.  Alcohol-induced chronic pancreatitis (HCC)  CT with sequela of chronic pancreatitis and parenchymal atrophy, mild main ductal dilation also noted  On Creon per primary team  Low bicarbonate level  -Bicarbonate level  improving.  Reduce oral sodium bicarbonate to twice a day  Persistent proteinuria  Proteinuria, UACR 1.6 g in May 2025  -Suspect secondary to underlying long-term uncontrolled diabetes, last hemoglobin A1c 9.6 in May 2025 although this has ranged from 8-11 going back to 2018  - Noted serum albumin 4.1    I have reviewed the nephrology recommendations including assessment and plan, with patient, and we are in agreement with renal plan including the information outlined above. Nephrology service will follow.    Subjective   Brief History of Admission - 55-year-old male with a past medical history of hypertension, diabetes mellitus type 2, chronic pancreatitis secondary to chronic alcohol use and recent admission for alc hep with likely ZENAIDA on CKD who presented to [facility] on 7/1 for concern of recurrent abdominal distension and inability to eat due to distention     No overnight events    Objective :  Temp:  [97.5 °F (36.4 °C)-98 °F (36.7 °C)] 97.5 °F (36.4 °C)  HR:  [75-83] 78  BP: (134-179)/() 151/58  Resp:  [12-18] 18  SpO2:  [98 %-100 %] 100 %  O2 Device: None (Room air)    Current Weight: Weight - Scale: 56.2 kg (123 lb 14.4 oz)  First Weight: Weight - Scale: 56.2 kg (123 lb 14.4 oz)  I/O         07/02 0701  07/03 0700 07/03 0701  07/04 0700 07/04 0701  07/05 0700    P.O.   240    IV Piggyback       Total Intake(mL/kg)   240 (4.3)    Other  5250     Total Output  5250     Net  -5250 +240                 Physical Exam  Constitutional:       General: He is not in acute distress.     Appearance: He is well-developed. He is not diaphoretic.   HENT:      Head: Normocephalic and atraumatic.     Eyes:      General: No scleral icterus.     Pupils: Pupils are equal, round, and reactive to light.       Cardiovascular:      Rate and Rhythm: Normal rate and regular rhythm.      Heart sounds: Normal heart sounds. No murmur heard.     No friction rub. No gallop.   Pulmonary:      Effort: Pulmonary effort is normal. No  "respiratory distress.      Breath sounds: Normal breath sounds. No wheezing or rales.   Chest:      Chest wall: No tenderness.   Abdominal:      General: Bowel sounds are normal. There is no distension.      Palpations: Abdomen is soft.      Tenderness: There is no abdominal tenderness. There is no rebound.     Musculoskeletal:         General: Normal range of motion.      Cervical back: Normal range of motion and neck supple.     Skin:     Findings: No rash.     Neurological:      Mental Status: He is alert and oriented to person, place, and time.         Medications:  Current Medications[1]      Lab Results: I have reviewed the following results:  Results from last 7 days   Lab Units 07/04/25  0549 07/03/25  1103 07/02/25  0901 07/02/25  0301 07/02/25  0028 07/01/25  2236 07/01/25 2026   WBC Thousand/uL 13.97*  --   --   --  17.38*  --  18.89*   HEMOGLOBIN g/dL 9.0*  --   --   --  10.9*  --  10.0*   HEMATOCRIT % 26.0*  --   --   --  32.2*  --  29.3*   PLATELETS Thousands/uL 206  --   --   --  276  --  277   POTASSIUM mmol/L 4.6 5.4* 5.4* 5.6*  5.6* 8.7* 7.0* 6.9*   CHLORIDE mmol/L 103 103  --  107 105  --  103   CO2 mmol/L 21 17*  --  16* 14*  --  17*   BUN mg/dL 49* 51*  --  52* 51*  --  55*   CREATININE mg/dL 3.94* 3.93*  --  3.71* 3.93*  --  3.91*   CALCIUM mg/dL 9.2 10.3*  --  9.6 9.8  --  10.1   MAGNESIUM mg/dL  --   --   --   --  2.7  --   --    PHOSPHORUS mg/dL  --   --   --   --  2.8  --   --    ALBUMIN g/dL 3.6 4.5  --   --  4.1  --  4.1       Administrative Statements   I have spent a total time of 15 minutes in caring for this patient on the day of the visit/encounter including Documenting in the medical record, Reviewing/placing orders in the medical record (including tests, medications, and/or procedures), and Obtaining or reviewing history  .  Portions of the record may have been created with voice recognition software. Occasional wrong word or \"sound a like\" substitutions may have occurred due to " the inherent limitations of voice recognition software. Read the chart carefully and recognize, using context, where substitutions have occurred.If you have any questions, please contact the dictating provider.       [1]   Current Facility-Administered Medications:     acetaminophen (TYLENOL) tablet 650 mg, 650 mg, Oral, Q6H PRN, ARIANE Prieto    albumin human (FLEXBUMIN) 25 % injection 12.5 g, 12.5 g, Intravenous, Q8H, Dylan Gillette MD, 12.5 g at 07/04/25 1403    aluminum-magnesium hydroxide-simethicone (MAALOX) oral suspension 30 mL, 30 mL, Oral, Q6H PRN, ARIANE Prieto    amLODIPine (NORVASC) tablet 5 mg, 5 mg, Oral, Daily, Gabriel Alexis MD, 5 mg at 07/04/25 0926    cefTRIAXone (ROCEPHIN) 1,000 mg in dextrose 5 % 50 mL IVPB, 1,000 mg, Intravenous, Q24H, Stephani Casanova DO, Last Rate: 100 mL/hr at 07/04/25 1254, 1,000 mg at 07/04/25 1254    folic acid (FOLVITE) tablet 1 mg, 1 mg, Oral, Daily, ARIANE Prieto, 1 mg at 07/04/25 0926    hydrALAZINE (APRESOLINE) injection 5 mg, 5 mg, Intravenous, Q6H PRN, Stephani Casanova DO    HYDROmorphone HCl (DILAUDID) injection 0.2 mg, 0.2 mg, Intravenous, Q8H PRN, Stephani Casanova DO, 0.2 mg at 07/02/25 2040    insulin glargine (LANTUS) subcutaneous injection 5 Units 0.05 mL, 5 Units, Subcutaneous, QAM, Stephani Casanova DO, 5 Units at 07/04/25 1214    insulin lispro (HumALOG/ADMELOG) 100 units/mL subcutaneous injection 1-5 Units, 1-5 Units, Subcutaneous, 4x Daily (AC & HS), 2 Units at 07/04/25 1214 **AND** Fingerstick Glucose (POCT), , , 4x Daily AC and at bedtime, ARIANE Prieto    ondansetron (ZOFRAN) injection 4 mg, 4 mg, Intravenous, Q6H PRN, ARIANE Prieto    pancrelipase (Lip-Prot-Amyl) (CREON) delayed release capsule 24,000 Units, 24,000 Units, Oral, TID With Meals, ARIANE Prieto, 24,000 Units at 07/04/25 1214    sodium bicarbonate tablet 1,300 mg, 1,300 mg, Oral, BID after meals,  Dylan Gillette MD    thiamine tablet 50 mg, 50 mg, Oral, Daily, ARIANE Prieto, 50 mg at 07/04/25 0955

## 2025-07-04 NOTE — PROGRESS NOTES
Progress Note - Hospitalist   Name: Wes Araujo 55 y.o. male I MRN: [id number]  Unit/Bed#: E4 -01 I Date of Admission: 7/1/2025   Date of Service: 7/4/2025 I Hospital Day: 2    Assessment & Plan  Primary hypertension  Blood pressure currently 173/93  continue Coreg, hydralazine and amlodipine at previous doses  Routine vital signs  continue prn hydralazine   Type 2 diabetes mellitus with hyperglycemia, with long-term current use of insulin (HCC)  Since patient left AMA recently he has not taken any of his insulin or oral medications  Continue 70/30 insulin 8 units twice daily but concern for hypoglycemia. Will write for lantus 5 units while in the hospital.   Sliding scale insulin  ACHS glucose checks  Diabetic diet  Hypoglycemia protocol    Lab Results   Component Value Date    HGBA1C 9.6 (H) 05/26/2025     Alcoholic hepatitis with ascites  Wes Araujo is a 55-year-old male with a past medical history of hypertension, diabetes mellitus type 2, chronic pancreatitis secondary to chronic alcohol use and recent admission for alc hep with likely ZENAIDA on CKD who presented to [facility] on 7/1 for concern of recurrent abdominal distension and inability to eat due to distention.  Patient recently admitted to [facility] (6/12 to 6/20) for the same and ultimately left AMA.     GI consulted, appreciate the assistance  Last paracentesis 6/18, 5700 mL of ascites removed  Ir consulted for paracentesis- s/p paracentesis yesterday for 5,270 ml   Continue ceftriaxone day 3  CT abdomen/pelvis showing: Sequela of chronic pancreatitis again seen. Mild wall thickening of multiple abdominal/pelvic small bowel loops as well as portions of the colon likely reactive in the setting of ascites. Can not exclude possibility of mild diffuse enterocolitis. No evidence of bowel obstruction, appendicitis, or free air. Large volume of abdominal/pelvic ascites again noted, similar compared to the prior exam.  Pt was accepted at  [facility]. Awaiting transfer     Lab Results   Component Value Date    ALT 22 07/04/2025    AST 42 (H) 07/04/2025    GGT 1,128 (H) 05/24/2025    ALKPHOS 212 (H) 07/04/2025    TBILI 18.52 (H) 07/04/2025          Pamela's Discriminant Function - Control Prothrombin 13: 40.67 at 7/2/2025 12:28 AM  Calculated from:  Total Bilirubin: 24.11 mg/dL at 7/2/2025 12:28 AM  Prothrombin Time: 16.6 seconds at 7/1/2025  8:26 PM  MDF = (4.6 * (PT - Control PT)) + Bilirubin     MELD 3.0: 36 at 7/3/2025 11:03 AM  MELD-Na: 35 at 7/3/2025 11:03 AM  Calculated from:  Serum Creatinine: 3.93 mg/dL (Using max of 3 mg/dL) at 7/3/2025 11:03 AM  Serum Sodium: 134 mmol/L at 7/3/2025 11:03 AM  Total Bilirubin: 25 mg/dL at 7/3/2025 11:03 AM  Serum Albumin: 4.5 g/dL (Using max of 3.5 g/dL) at 7/3/2025 11:03 AM  INR(ratio): 1.33 at 7/1/2025  8:26 PM  Age at listing (hypothetical): 55 years  Sex: Male at 7/3/2025 11:03 AM    Paroxysmal A-fib (HCC)  History of paroxysmal atrial fibrillation  Restart Coreg 6.25 mg twice daily  Not on anticoagulation  Acute renal failure superimposed on stage 4 chronic kidney disease (HCC)  Avoid nephrotoxins and hypotension  Appreciate nephrology recommendations  S/p albumin   Restart bicarb tablets 3 times daily  Lab Results   Component Value Date    EGFR 16 07/04/2025    EGFR 16 07/03/2025    EGFR 17 07/02/2025    CREATININE 3.94 (H) 07/04/2025    CREATININE 3.93 (H) 07/03/2025    CREATININE 3.71 (H) 07/02/2025     Alcohol-induced chronic pancreatitis (HCC)  Noted again on CAT scan  Restart Creon 24,000 units 3 times a day with meals  Pt has been abstinent from alcohol for at least 3 months      Hyperkalemia  Potassium in the emergency room 6.9-->7.0  Hemolyzed, yet treated with albuterol, calcium gluconate, and insulin/dextrose  Repeat potassium 5.4  Continue low potassium diet  Has since resolved   Alcohol abuse  H/o  Patient reports that he is not currently drinking and has not drank since prior to his previous  admission  Nonetheless, monitor for signs of withdrawal  Continue thiamine/folate supplementation  Moderate protein-calorie malnutrition (HCC)  Malnutrition Findings:   Adult Malnutrition type: Acute illness  Adult Degree of Malnutrition: Malnutrition of moderate degree  Malnutrition Characteristics: Fat loss, Muscle loss, Inadequate energy                  360 Statement: Severe calorie protein malnutrition in the setting of acute illness r/t inadequte PO intake in setting of abdominal distention/ pain as evidenced by:  moderate body fat (triceps, ribcage area) and muscle mass depletions (temporal wasting, protruding clavicles) energy intake less than 50% compared to estimated needs>5 days; Treated with oral supplements    BMI Findings:           Body mass index is 18.3 kg/m².     Low bicarbonate level    Persistent proteinuria      VTE Pharmacologic Prophylaxis: VTE Score: 1 contraindicated     Mobility:   Basic Mobility Inpatient Raw Score: 14  -[facility] Goal: 4: Move to chair/commode  -HL Achieved: 2: Bed activities/Dependent transfer      Patient Centered Rounds: discussed with his nurse      Education and Discussions with Family / Patient: Updated  (father) at bedside.    Current Length of Stay: 2 day(s)  Current Patient Status: Inpatient   Certification Statement: The patient will continue to require additional inpatient hospital stay due to awaiting transfer to [facility]   Discharge Plan: awaiting transfer to [facility]     Code Status: Level 1 - Full Code    Subjective   Pt seen and examined. Pt states his abd pain is much improved. No f/c no cp no sob no n/v/d     Objective :  Temp:  [97.5 °F (36.4 °C)-98 °F (36.7 °C)] 97.8 °F (36.6 °C)  HR:  [75-83] 75  BP: (134-187)/(72-94) 165/79  Resp:  [12-18] 18  SpO2:  [98 %-100 %] 100 %  O2 Device: None (Room air)    Body mass index is 18.3 kg/m².     Input and Output Summary (last 24 hours):     Intake/Output Summary (Last 24 hours) at 7/4/2025  1010  Last data filed at 7/4/2025 0859  Gross per 24 hour   Intake 240 ml   Output --   Net 240 ml       Physical Exam  Constitutional:       Appearance: Normal appearance.   HENT:      Head: Normocephalic and atraumatic.     Eyes:      General: Scleral icterus present.      Extraocular Movements: Extraocular movements intact.      Pupils: Pupils are equal, round, and reactive to light.       Cardiovascular:      Rate and Rhythm: Normal rate and regular rhythm.      Heart sounds: No murmur heard.     No friction rub. No gallop.   Pulmonary:      Effort: Pulmonary effort is normal. No respiratory distress.      Breath sounds: Normal breath sounds. No wheezing, rhonchi or rales.   Abdominal:      General: There is no distension.      Palpations: Abdomen is soft.      Tenderness: There is no abdominal tenderness. There is no guarding or rebound.     Skin:     Coloration: Skin is jaundiced.     Neurological:      Mental Status: He is alert and oriented to person, place, and time.       Lines/Drains:        Lab Results: I have reviewed the following results:   Results from last 7 days   Lab Units 07/04/25  0549 07/02/25  0028   WBC Thousand/uL 13.97* 17.38*   HEMOGLOBIN g/dL 9.0* 10.9*   HEMATOCRIT % 26.0* 32.2*   PLATELETS Thousands/uL 206 276   SEGS PCT %  --  79*   LYMPHO PCT %  --  11*   MONO PCT %  --  8   EOS PCT %  --  1     Results from last 7 days   Lab Units 07/04/25  0549   SODIUM mmol/L 135   POTASSIUM mmol/L 4.6   CHLORIDE mmol/L 103   CO2 mmol/L 21   BUN mg/dL 49*   CREATININE mg/dL 3.94*   ANION GAP mmol/L 11   CALCIUM mg/dL 9.2   ALBUMIN g/dL 3.6   TOTAL BILIRUBIN mg/dL 18.52*   ALK PHOS U/L 212*   ALT U/L 22   AST U/L 42*   GLUCOSE RANDOM mg/dL 234*     Results from last 7 days   Lab Units 07/01/25 2026   INR  1.33*     Results from last 7 days   Lab Units 07/04/25  0721 07/03/25  2053 07/03/25  1611 07/03/25  1020 07/03/25  0708 07/02/25  2111 07/02/25  1620 07/02/25  1059 07/02/25  0726 07/02/25  0035  07/01/25  2316   POC GLUCOSE mg/dl 226* 245* 271* 220* 264* 190* 132 168* 210* 231* 232*               Recent Cultures (last 7 days):   Results from last 7 days   Lab Units 07/03/25  0906   GRAM STAIN RESULT  1+ Polys  No organisms seen       Imaging Results Review: No pertinent imaging studies reviewed.  Other Study Results Review: No additional pertinent studies reviewed.    Last 24 Hours Medication List:     Current Facility-Administered Medications:     acetaminophen (TYLENOL) tablet 650 mg, Q6H PRN    aluminum-magnesium hydroxide-simethicone (MAALOX) oral suspension 30 mL, Q6H PRN    amLODIPine (NORVASC) tablet 5 mg, Daily    cefTRIAXone (ROCEPHIN) 1,000 mg in dextrose 5 % 50 mL IVPB, Q24H, Last Rate: 1,000 mg (07/03/25 1237)    folic acid (FOLVITE) tablet 1 mg, Daily    hydrALAZINE (APRESOLINE) injection 5 mg, Q6H PRN    HYDROmorphone HCl (DILAUDID) injection 0.2 mg, Q8H PRN    insulin glargine (LANTUS) subcutaneous injection 5 Units 0.05 mL, QAM    insulin lispro (HumALOG/ADMELOG) 100 units/mL subcutaneous injection 1-5 Units, 4x Daily (AC & HS) **AND** Fingerstick Glucose (POCT), 4x Daily AC and at bedtime    ondansetron (ZOFRAN) injection 4 mg, Q6H PRN    pancrelipase (Lip-Prot-Amyl) (CREON) delayed release capsule 24,000 Units, TID With Meals    sodium bicarbonate tablet 1,300 mg, TID after meals    thiamine tablet 50 mg, Daily    Administrative Statements   Today, Patient Was Seen By: Stephani Casanova DO

## 2025-07-04 NOTE — PLAN OF CARE
Problem: SAFETY ADULT  Goal: Patient will remain free of falls  Description: INTERVENTIONS:  - Educate patient/family on patient safety including physical limitations  - Instruct patient to call for assistance with activity   - Consider consulting OT/PT to assist with strengthening/mobility based on AM PAC & JH-HLM score  - Consult OT/PT to assist with strengthening/mobility   - Keep Call bell within reach  - Keep bed low and locked with side rails adjusted as appropriate  - Keep care items and personal belongings within reach  - Initiate and maintain comfort rounds  - Make Fall Risk Sign visible to staff  - Offer Toileting every 2 Hours, in advance of need  - Initiate/Maintain bed alarm  - Obtain necessary fall risk management equipment: bed alarm  - Apply yellow socks and bracelet for high fall risk patients  - Consider moving patient to room near nurses station  Outcome: Progressing  Goal: Maintain or return to baseline ADL function  Description: INTERVENTIONS:  -  Assess patient's ability to carry out ADLs; assess patient's baseline for ADL function and identify physical deficits which impact ability to perform ADLs (bathing, care of mouth/teeth, toileting, grooming, dressing, etc.)  - Assess/evaluate cause of self-care deficits   - Assess range of motion  - Assess patient's mobility; develop plan if impaired  - Assess patient's need for assistive devices and provide as appropriate  - Encourage maximum independence but intervene and supervise when necessary  - Involve family in performance of ADLs  - Assess for home care needs following discharge   - Consider OT consult to assist with ADL evaluation and planning for discharge  - Provide patient education as appropriate  - Monitor functional capacity and physical performance, use of AM PAC & JH-HLM   - Monitor gait, balance and fatigue with ambulation    Outcome: Progressing  Goal: Maintains/Returns to pre admission functional level  Description:  INTERVENTIONS:  - Perform AM-PAC 6 Click Basic Mobility/ Daily Activity assessment daily.  - Set and communicate daily mobility goal to care team and patient/family/caregiver.   - Collaborate with rehabilitation services on mobility goals if consulted  - Perform Range of Motion 3 times a day.  - Reposition patient every 2 hours.  - Dangle patient 3 times a day  - Stand patient 3 times a day  - Ambulate patient 3 times a day  - Out of bed to chair 3 times a day   - Out of bed for meals 3 times a day  - Out of bed for toileting  - Record patient progress and toleration of activity level   Outcome: Progressing

## 2025-07-04 NOTE — PLAN OF CARE
Problem: SAFETY ADULT  Goal: Patient will remain free of falls  Description: INTERVENTIONS:  - Educate patient/family on patient safety including physical limitations  - Instruct patient to call for assistance with activity   - Consider consulting OT/PT to assist with strengthening/mobility based on AM PAC & JH-HLM score  - Consult OT/PT to assist with strengthening/mobility   - Keep Call bell within reach  - Keep bed low and locked with side rails adjusted as appropriate  - Keep care items and personal belongings within reach  - Initiate and maintain comfort rounds  - Make Fall Risk Sign visible to staff  - Offer Toileting every 2 Hours, in advance of need  - Initiate/Maintain bed alarm  - Obtain necessary fall risk management equipment: yellow socks  - Apply yellow socks and bracelet for high fall risk patients  - Consider moving patient to room near nurses station  Outcome: Progressing  Goal: Maintain or return to baseline ADL function  Description: INTERVENTIONS:  -  Assess patient's ability to carry out ADLs; assess patient's baseline for ADL function and identify physical deficits which impact ability to perform ADLs (bathing, care of mouth/teeth, toileting, grooming, dressing, etc.)  - Assess/evaluate cause of self-care deficits   - Assess range of motion  - Assess patient's mobility; develop plan if impaired  - Assess patient's need for assistive devices and provide as appropriate  - Encourage maximum independence but intervene and supervise when necessary  - Involve family in performance of ADLs  - Assess for home care needs following discharge   - Consider OT consult to assist with ADL evaluation and planning for discharge  - Provide patient education as appropriate  - Monitor functional capacity and physical performance, use of AM PAC & JH-HLM   - Monitor gait, balance and fatigue with ambulation    Outcome: Progressing  Goal: Maintains/Returns to pre admission functional level  Description:  INTERVENTIONS:  - Perform AM-PAC 6 Click Basic Mobility/ Daily Activity assessment daily.  - Set and communicate daily mobility goal to care team and patient/family/caregiver.   - Collaborate with rehabilitation services on mobility goals if consulted  - Perform Range of Motion 3 times a day.  - Reposition patient every 2 hours.  - Dangle patient 3 times a day  - Stand patient 3 times a day  - Ambulate patient 3 times a day  - Out of bed to chair 3 times a day   - Out of bed for meals 3 times a day  - Out of bed for toileting  - Record patient progress and toleration of activity level   Outcome: Progressing

## 2025-07-04 NOTE — ASSESSMENT & PLAN NOTE
Lab Results   Component Value Date    EGFR 16 07/04/2025    EGFR 16 07/03/2025    EGFR 17 07/02/2025    CREATININE 3.94 (H) 07/04/2025    CREATININE 3.93 (H) 07/03/2025    CREATININE 3.71 (H) 07/02/2025

## 2025-07-04 NOTE — ASSESSMENT & PLAN NOTE
Wes Araujo is a 55-year-old male with a past medical history of hypertension, diabetes mellitus type 2, chronic pancreatitis secondary to chronic alcohol use and recent admission for alc hep with likely ZENAIDA on CKD who presented to [facility] on 7/1 for concern of recurrent abdominal distension and inability to eat due to distention.  Patient recently admitted to [facility] (6/12 to 6/20) for the same and ultimately left AMA.     GI consulted, appreciate the assistance  Last paracentesis 6/18, 5700 mL of ascites removed  Ir consulted for paracentesis- s/p paracentesis yesterday for 5,270 ml   Continue ceftriaxone day 3  CT abdomen/pelvis showing: Sequela of chronic pancreatitis again seen. Mild wall thickening of multiple abdominal/pelvic small bowel loops as well as portions of the colon likely reactive in the setting of ascites. Can not exclude possibility of mild diffuse enterocolitis. No evidence of bowel obstruction, appendicitis, or free air. Large volume of abdominal/pelvic ascites again noted, similar compared to the prior exam.  Pt was accepted at [facility]. Awaiting transfer     Lab Results   Component Value Date    ALT 22 07/04/2025    AST 42 (H) 07/04/2025    GGT 1,128 (H) 05/24/2025    ALKPHOS 212 (H) 07/04/2025    TBILI 18.52 (H) 07/04/2025          Pamela's Discriminant Function - Control Prothrombin 13: 40.67 at 7/2/2025 12:28 AM  Calculated from:  Total Bilirubin: 24.11 mg/dL at 7/2/2025 12:28 AM  Prothrombin Time: 16.6 seconds at 7/1/2025  8:26 PM  MDF = (4.6 * (PT - Control PT)) + Bilirubin     MELD 3.0: 36 at 7/3/2025 11:03 AM  MELD-Na: 35 at 7/3/2025 11:03 AM  Calculated from:  Serum Creatinine: 3.93 mg/dL (Using max of 3 mg/dL) at 7/3/2025 11:03 AM  Serum Sodium: 134 mmol/L at 7/3/2025 11:03 AM  Total Bilirubin: 25 mg/dL at 7/3/2025 11:03 AM  Serum Albumin: 4.5 g/dL (Using max of 3.5 g/dL) at 7/3/2025 11:03 AM  INR(ratio): 1.33 at 7/1/2025  8:26 PM  Age at listing (hypothetical): 55  years  Sex: Male at 7/3/2025 11:03 AM

## 2025-07-04 NOTE — ASSESSMENT & PLAN NOTE
Since patient left AMA recently he has not taken any of his insulin or oral medications  Continue 70/30 insulin 8 units twice daily but concern for hypoglycemia. Will write for lantus 5 units while in the hospital.   Sliding scale insulin  ACHS glucose checks  Diabetic diet  Hypoglycemia protocol    Lab Results   Component Value Date    HGBA1C 9.6 (H) 05/26/2025

## 2025-07-04 NOTE — ASSESSMENT & PLAN NOTE
CT findings with ascites as above  Status post paracentesis on 7/3/2025 by IR today.  GI following.  Plan noted for transfer to [facility] for transplant evaluation.

## 2025-07-04 NOTE — PLAN OF CARE
Problem: SAFETY ADULT  Goal: Patient will remain free of falls  Description: INTERVENTIONS:  - Educate patient/family on patient safety including physical limitations  - Instruct patient to call for assistance with activity   - Consider consulting OT/PT to assist with strengthening/mobility based on AM PAC & JH-HLM score  - Consult OT/PT to assist with strengthening/mobility   - Keep Call bell within reach  - Keep bed low and locked with side rails adjusted as appropriate  - Keep care items and personal belongings within reach  - Initiate and maintain comfort rounds  - Make Fall Risk Sign visible to staff  - Offer Toileting every  Hours, in advance of need  - Initiate/Maintain alarm  - Obtain necessary fall risk management equipment:   - Apply yellow socks and bracelet for high fall risk patients  - Consider moving patient to room near nurses station  7/4/2025 0505 by Sue Rocha LPN  Outcome: Progressing  7/3/2025 2328 by Sue Rocha LPN  Outcome: Progressing  Goal: Maintain or return to baseline ADL function  Description: INTERVENTIONS:  -  Assess patient's ability to carry out ADLs; assess patient's baseline for ADL function and identify physical deficits which impact ability to perform ADLs (bathing, care of mouth/teeth, toileting, grooming, dressing, etc.)  - Assess/evaluate cause of self-care deficits   - Assess range of motion  - Assess patient's mobility; develop plan if impaired  - Assess patient's need for assistive devices and provide as appropriate  - Encourage maximum independence but intervene and supervise when necessary  - Involve family in performance of ADLs  - Assess for home care needs following discharge   - Consider OT consult to assist with ADL evaluation and planning for discharge  - Provide patient education as appropriate  - Monitor functional capacity and physical performance, use of AM PAC & JH-HLM   - Monitor gait, balance and fatigue with ambulation    7/4/2025 0505 by Sue Rocha  LPN  Outcome: Progressing  7/3/2025 2328 by Sue Rocha LPN  Outcome: Progressing  Goal: Maintains/Returns to pre admission functional level  Description: INTERVENTIONS:  - Perform AM-PAC 6 Click Basic Mobility/ Daily Activity assessment daily.  - Set and communicate daily mobility goal to care team and patient/family/caregiver.   - Collaborate with rehabilitation services on mobility goals if consulted  - Perform Range of Motion  times a day.  - Reposition patient every  hours.  - Dangle patient  times a day  - Stand patient  times a day  - Ambulate patient  times a day  - Out of bed to chair  times a day   - Out of bed for meals times a day  - Out of bed for toileting  - Record patient progress and toleration of activity level   7/4/2025 0505 by Sue Rocha LPN  Outcome: Progressing  7/3/2025 2328 by Sue Rocha LPN  Outcome: Progressing

## 2025-07-04 NOTE — ASSESSMENT & PLAN NOTE
Blood pressure currently 173/93  continue Coreg, hydralazine and amlodipine at previous doses  Routine vital signs  continue prn hydralazine

## 2025-07-04 NOTE — ASSESSMENT & PLAN NOTE
ZENAIDA on CKD stage IV, overall fluctuating creatinine, baseline creatinine 1.8-2.1 since March 2025, episodes of ZENAIDA during recent hospitalizations noted, creatinine fluctuated mid to high twos in May 2025 and more recently during hospitalization 3.2-3.4 in June 2025  -Renal function stable creatinine plateaued at 3.9 mg/dL  -Awaiting transfer to [facility]  -ZENAIDA could be in the setting of decreased effective intravascular volume with multiple hyaline casts on UA in the recent past, incomplete recovery from recent ZENAIDA, component of HRS remains differential versus overall progression of CKD given uncontrolled diabetes  -Recent UA in June 2025 showed 3+ proteinuria, no hematuria with multiple hyaline cast.  -Follow results for repeat UA with microscopy, urine electrolytes.    -No hydro on CT scan.  Renal ultrasound in June 2025 showed normal-sized kidneys, somewhat echogenic renal parenchyma in both kidneys  -Status post paracentesis  -Check Cystatin C tomorrow  - Hold diuretics  -Plan for further colloid expansion

## 2025-07-05 VITALS
SYSTOLIC BLOOD PRESSURE: 176 MMHG | DIASTOLIC BLOOD PRESSURE: 86 MMHG | WEIGHT: 123.9 LBS | TEMPERATURE: 98.5 F | HEART RATE: 75 BPM | RESPIRATION RATE: 18 BRPM | OXYGEN SATURATION: 99 % | HEIGHT: 69 IN | BODY MASS INDEX: 18.35 KG/M2

## 2025-07-05 PROBLEM — K76.82 HEPATIC ENCEPHALOPATHY (HCC): Status: ACTIVE | Noted: 2025-02-05

## 2025-07-05 LAB
ALBUMIN SERPL BCG-MCNC: 3.5 G/DL (ref 3.5–5)
ALP SERPL-CCNC: 240 U/L (ref 34–104)
ALT SERPL W P-5'-P-CCNC: 26 U/L (ref 7–52)
AMMONIA PLAS-SCNC: 89 UMOL/L (ref 18–72)
ANION GAP SERPL CALCULATED.3IONS-SCNC: 11 MMOL/L (ref 4–13)
AST SERPL W P-5'-P-CCNC: 66 U/L (ref 13–39)
BILIRUB DIRECT SERPL-MCNC: 9.77 MG/DL (ref 0–0.2)
BILIRUB SERPL-MCNC: 18.71 MG/DL (ref 0.2–1)
BUN SERPL-MCNC: 46 MG/DL (ref 5–25)
CALCIUM SERPL-MCNC: 8.9 MG/DL (ref 8.4–10.2)
CARDIAC TROPONIN I PNL SERPL HS: 13 NG/L (ref ?–50)
CHLORIDE SERPL-SCNC: 103 MMOL/L (ref 96–108)
CO2 SERPL-SCNC: 23 MMOL/L (ref 21–32)
CREAT SERPL-MCNC: 3.75 MG/DL (ref 0.6–1.3)
ERYTHROCYTE [DISTWIDTH] IN BLOOD BY AUTOMATED COUNT: 14.9 % (ref 11.6–15.1)
GFR SERPL CREATININE-BSD FRML MDRD: 17 ML/MIN/1.73SQ M
GLUCOSE SERPL-MCNC: 107 MG/DL (ref 65–140)
GLUCOSE SERPL-MCNC: 132 MG/DL (ref 65–140)
GLUCOSE SERPL-MCNC: 163 MG/DL (ref 65–140)
GLUCOSE SERPL-MCNC: 216 MG/DL (ref 65–140)
HCT VFR BLD AUTO: 26 % (ref 36.5–49.3)
HGB BLD-MCNC: 9.1 G/DL (ref 12–17)
MCH RBC QN AUTO: 32.2 PG (ref 26.8–34.3)
MCHC RBC AUTO-ENTMCNC: 35 G/DL (ref 31.4–37.4)
MCV RBC AUTO: 92 FL (ref 82–98)
PLATELET # BLD AUTO: 217 THOUSANDS/UL (ref 149–390)
PMV BLD AUTO: 11.6 FL (ref 8.9–12.7)
POTASSIUM SERPL-SCNC: 4.7 MMOL/L (ref 3.5–5.3)
PROT SERPL-MCNC: 5.9 G/DL (ref 6.4–8.4)
RBC # BLD AUTO: 2.83 MILLION/UL (ref 3.88–5.62)
SODIUM SERPL-SCNC: 137 MMOL/L (ref 135–147)
WBC # BLD AUTO: 14.04 THOUSAND/UL (ref 4.31–10.16)

## 2025-07-05 PROCEDURE — 99232 SBSQ HOSP IP/OBS MODERATE 35: CPT | Performed by: INTERNAL MEDICINE

## 2025-07-05 PROCEDURE — NC001 PR NO CHARGE: Performed by: INTERNAL MEDICINE

## 2025-07-05 PROCEDURE — 85027 COMPLETE CBC AUTOMATED: CPT | Performed by: INTERNAL MEDICINE

## 2025-07-05 PROCEDURE — 99233 SBSQ HOSP IP/OBS HIGH 50: CPT | Performed by: INTERNAL MEDICINE

## 2025-07-05 PROCEDURE — 93005 ELECTROCARDIOGRAM TRACING: CPT

## 2025-07-05 PROCEDURE — 99239 HOSP IP/OBS DSCHRG MGMT >30: CPT | Performed by: INTERNAL MEDICINE

## 2025-07-05 PROCEDURE — 80048 BASIC METABOLIC PNL TOTAL CA: CPT | Performed by: INTERNAL MEDICINE

## 2025-07-05 PROCEDURE — 84484 ASSAY OF TROPONIN QUANT: CPT | Performed by: INTERNAL MEDICINE

## 2025-07-05 PROCEDURE — 80076 HEPATIC FUNCTION PANEL: CPT | Performed by: INTERNAL MEDICINE

## 2025-07-05 PROCEDURE — 82948 REAGENT STRIP/BLOOD GLUCOSE: CPT

## 2025-07-05 PROCEDURE — 82140 ASSAY OF AMMONIA: CPT | Performed by: INTERNAL MEDICINE

## 2025-07-05 RX ORDER — LACTULOSE 10 G/15ML
20 SOLUTION ORAL 3 TIMES DAILY
Start: 2025-07-05

## 2025-07-05 RX ORDER — LACTULOSE 10 G/15ML
20 SOLUTION ORAL 3 TIMES DAILY
Status: DISCONTINUED | OUTPATIENT
Start: 2025-07-05 | End: 2025-07-05 | Stop reason: HOSPADM

## 2025-07-05 RX ORDER — SODIUM BICARBONATE 650 MG/1
650 TABLET ORAL
Status: DISCONTINUED | OUTPATIENT
Start: 2025-07-05 | End: 2025-07-05 | Stop reason: HOSPADM

## 2025-07-05 RX ORDER — SODIUM BICARBONATE 650 MG/1
1300 TABLET ORAL
Start: 2025-07-05

## 2025-07-05 RX ADMIN — PANCRELIPASE 24000 UNITS: 120000; 24000; 76000 CAPSULE, DELAYED RELEASE PELLETS ORAL at 16:55

## 2025-07-05 RX ADMIN — SODIUM BICARBONATE 650 MG TABLET 1300 MG: at 08:30

## 2025-07-05 RX ADMIN — AMLODIPINE BESYLATE 5 MG: 5 TABLET ORAL at 08:30

## 2025-07-05 RX ADMIN — LACTULOSE 20 G: 10 SOLUTION ORAL at 09:38

## 2025-07-05 RX ADMIN — INSULIN LISPRO 2 UNITS: 100 INJECTION, SOLUTION INTRAVENOUS; SUBCUTANEOUS at 16:55

## 2025-07-05 RX ADMIN — PANCRELIPASE 24000 UNITS: 120000; 24000; 76000 CAPSULE, DELAYED RELEASE PELLETS ORAL at 12:06

## 2025-07-05 RX ADMIN — CEFTRIAXONE 1000 MG: 10 INJECTION, POWDER, FOR SOLUTION INTRAVENOUS at 12:46

## 2025-07-05 RX ADMIN — ALBUMIN (HUMAN) 12.5 G: 0.25 INJECTION, SOLUTION INTRAVENOUS at 06:33

## 2025-07-05 RX ADMIN — INSULIN LISPRO 1 UNITS: 100 INJECTION, SOLUTION INTRAVENOUS; SUBCUTANEOUS at 12:07

## 2025-07-05 RX ADMIN — LACTULOSE 20 G: 10 SOLUTION ORAL at 16:55

## 2025-07-05 RX ADMIN — ALBUMIN (HUMAN) 12.5 G: 0.25 INJECTION, SOLUTION INTRAVENOUS at 13:32

## 2025-07-05 RX ADMIN — SODIUM BICARBONATE 650 MG TABLET 650 MG: at 16:55

## 2025-07-05 RX ADMIN — THIAMINE HCL TAB 100 MG 50 MG: 100 TAB at 08:30

## 2025-07-05 RX ADMIN — PANCRELIPASE 24000 UNITS: 120000; 24000; 76000 CAPSULE, DELAYED RELEASE PELLETS ORAL at 08:30

## 2025-07-05 RX ADMIN — FOLIC ACID 1 MG: 1 TABLET ORAL at 08:30

## 2025-07-05 RX ADMIN — ONDANSETRON 4 MG: 2 INJECTION INTRAMUSCULAR; INTRAVENOUS at 20:28

## 2025-07-05 NOTE — PROGRESS NOTES
Progress Note - Nephrology   Name: Wes Araujo 55 y.o. male I MRN: [id number]  Unit/Bed#: E4 -01 I Date of Admission: 7/1/2025   Date of Service: 7/5/2025 I Hospital Day: 3     Assessment & Plan  Acute renal failure superimposed on stage 4 chronic kidney disease (HCC)  ZENAIDA on CKD stage IV, overall fluctuating creatinine, baseline creatinine 1.8-2.1 since March 2025, episodes of ZENAIDA during recent hospitalizations noted, creatinine fluctuated mid to high twos in May 2025 and more recently during hospitalization 3.2-3.4 in June 2025  -Renal function has improved today down to 3.7 mg/dL getting close to most recent baseline  -Awaiting transfer to [facility]  -ZENAIDA could be in the setting of decreased effective intravascular volume with multiple hyaline casts on UA in the recent past, incomplete recovery from recent ZENAIDA, component of HRS remains differential versus overall progression of CKD given uncontrolled diabetes  -Recent UA in June 2025 showed 3+ proteinuria, no hematuria with multiple hyaline cast.  -Follow results for repeat UA with microscopy, urine electrolytes.    -No hydro on CT scan.  Renal ultrasound in June 2025 showed normal-sized kidneys, somewhat echogenic renal parenchyma in both kidneys  -Status post paracentesis  -Check a Cystatin C level tomorrow  -Continue to hold diuretic  - Complete colloid expansion with IV albumin  -Daily BMP  Primary hypertension  Currently not hypotensive.  Continue antihypertensive regimen  Pleat IV albumin  If blood pressure persistently elevated can increase amlodipine to 10 mg  Alcoholic hepatitis with ascites  CT findings with ascites as above  Status post paracentesis on 7/3/2025 by IR today.  GI following.  Plan noted for transfer to [facility] for transplant evaluation.  Alcohol-induced chronic pancreatitis (HCC)  CT with sequela of chronic pancreatitis and parenchymal atrophy, mild main ductal dilation also noted  On Creon per primary team  Low bicarbonate  level  - Serum bicarb level has improved to 23 will reduce oral sodium bicarbonate to 650 mg twice a day  Persistent proteinuria  Proteinuria, UACR 1.6 g in May 2025  -Suspect secondary to underlying long-term uncontrolled diabetes, last hemoglobin A1c 9.6 in May 2025 although this has ranged from 8-11 going back to 2018  - Noted serum albumin 4.1  Hepatic encephalopathy (HCC)      I have reviewed the nephrology recommendations including assessment and plan, with patient, and we are in agreement with renal plan including the information outlined above. Nephrology service will follow.    Subjective   Brief History of Admission - 55-year-old male with a past medical history of hypertension, diabetes mellitus type 2, chronic pancreatitis secondary to chronic alcohol use and recent admission for alc hep with likely ZENAIDA on CKD who presented to [facility] on 7/1 for concern of recurrent abdominal distension and inability to eat due to distention     No overnight events    Objective :  Temp:  [97.9 °F (36.6 °C)-98.3 °F (36.8 °C)] 98.3 °F (36.8 °C)  HR:  [70-75] 72  BP: (147-157)/(70-80) 151/79  Resp:  [18] 18  SpO2:  [99 %-100 %] 99 %  O2 Device: None (Room air)    Current Weight: Weight - Scale: 56.2 kg (123 lb 14.4 oz)  First Weight: Weight - Scale: 56.2 kg (123 lb 14.4 oz)  I/O         07/03 0701  07/04 0700 07/04 0701  07/05 0700 07/05 0701  07/06 0700    P.O.  240     Total Intake(mL/kg)  240 (4.3)     Other 5250      Total Output 5250      Net -5250 +240                  Physical Exam  Vitals and nursing note reviewed.   Constitutional:       General: He is not in acute distress.     Appearance: He is well-developed. He is not diaphoretic.   HENT:      Head: Normocephalic and atraumatic.     Eyes:      General: No scleral icterus.     Pupils: Pupils are equal, round, and reactive to light.       Cardiovascular:      Rate and Rhythm: Normal rate and regular rhythm.      Heart sounds: Normal heart sounds. No murmur heard.      No friction rub. No gallop.   Pulmonary:      Effort: Pulmonary effort is normal. No respiratory distress.      Breath sounds: Normal breath sounds. No wheezing or rales.   Chest:      Chest wall: No tenderness.   Abdominal:      General: Bowel sounds are normal. There is no distension.      Palpations: Abdomen is soft.      Tenderness: There is no abdominal tenderness. There is no rebound.     Musculoskeletal:         General: Normal range of motion.      Cervical back: Normal range of motion and neck supple.     Skin:     Findings: No rash.     Neurological:      Mental Status: He is alert and oriented to person, place, and time.         Medications:  Current Medications[1]      Lab Results: I have reviewed the following results:  Results from last 7 days   Lab Units 07/05/25  0907 07/05/25  0630 07/04/25  0549 07/03/25  1103 07/02/25  0901 07/02/25  0301 07/02/25  0028 07/01/25  2236 07/01/25  2026   WBC Thousand/uL 14.04*  --  13.97*  --   --   --  17.38*  --  18.89*   HEMOGLOBIN g/dL 9.1*  --  9.0*  --   --   --  10.9*  --  10.0*   HEMATOCRIT % 26.0*  --  26.0*  --   --   --  32.2*  --  29.3*   PLATELETS Thousands/uL 217  --  206  --   --   --  276  --  277   POTASSIUM mmol/L  --  4.7 4.6 5.4* 5.4* 5.6*  5.6* 8.7* 7.0* 6.9*   CHLORIDE mmol/L  --  103 103 103  --  107 105  --  103   CO2 mmol/L  --  23 21 17*  --  16* 14*  --  17*   BUN mg/dL  --  46* 49* 51*  --  52* 51*  --  55*   CREATININE mg/dL  --  3.75* 3.94* 3.93*  --  3.71* 3.93*  --  3.91*   CALCIUM mg/dL  --  8.9 9.2 10.3*  --  9.6 9.8  --  10.1   MAGNESIUM mg/dL  --   --   --   --   --   --  2.7  --   --    PHOSPHORUS mg/dL  --   --   --   --   --   --  2.8  --   --    ALBUMIN g/dL  --  3.5 3.6 4.5  --   --  4.1  --  4.1       Administrative Statements   I have spent a total time of 15 minutes in caring for this patient on the day of the visit/encounter including Documenting in the medical record, Reviewing/placing orders in the medical record  "(including tests, medications, and/or procedures), and Obtaining or reviewing history  .  Portions of the record may have been created with voice recognition software. Occasional wrong word or \"sound a like\" substitutions may have occurred due to the inherent limitations of voice recognition software. Read the chart carefully and recognize, using context, where substitutions have occurred.If you have any questions, please contact the dictating provider.         [1]   Current Facility-Administered Medications:     acetaminophen (TYLENOL) tablet 650 mg, 650 mg, Oral, Q6H PRN, ARIANE Prieto    albumin human (FLEXBUMIN) 25 % injection 12.5 g, 12.5 g, Intravenous, Q8H, Dylan Gillette MD, 12.5 g at 07/05/25 0633    aluminum-magnesium hydroxide-simethicone (MAALOX) oral suspension 30 mL, 30 mL, Oral, Q6H PRN, ARIANE Prieto    amLODIPine (NORVASC) tablet 5 mg, 5 mg, Oral, Daily, Gabriel Alexis MD, 5 mg at 07/05/25 0830    cefTRIAXone (ROCEPHIN) 1,000 mg in dextrose 5 % 50 mL IVPB, 1,000 mg, Intravenous, Q24H, Stephani Casanova DO, Last Rate: 100 mL/hr at 07/05/25 1246, 1,000 mg at 07/05/25 1246    folic acid (FOLVITE) tablet 1 mg, 1 mg, Oral, Daily, ARIANE Prieto, 1 mg at 07/05/25 0830    hydrALAZINE (APRESOLINE) injection 5 mg, 5 mg, Intravenous, Q6H PRN, Stephani Barajasron, DO    HYDROmorphone HCl (DILAUDID) injection 0.2 mg, 0.2 mg, Intravenous, Q8H PRN, Stephani Casanova DO, 0.2 mg at 07/02/25 2040    insulin glargine (LANTUS) subcutaneous injection 5 Units 0.05 mL, 5 Units, Subcutaneous, QAM, Stephani Casanova DO, 5 Units at 07/04/25 1214    insulin lispro (HumALOG/ADMELOG) 100 units/mL subcutaneous injection 1-5 Units, 1-5 Units, Subcutaneous, 4x Daily (AC & HS), 1 Units at 07/05/25 1207 **AND** Fingerstick Glucose (POCT), , , 4x Daily AC and at bedtime, ARIANE Prieto    lactulose (CHRONULAC) oral solution 20 g, 20 g, Oral, TID, Stephani Casanova DO, 20 g at 07/05/25 " 0938    ondansetron (ZOFRAN) injection 4 mg, 4 mg, Intravenous, Q6H PRN, ARIANE Prieto    pancrelipase (Lip-Prot-Amyl) (CREON) delayed release capsule 24,000 Units, 24,000 Units, Oral, TID With Meals, ARIANE Prieto, 24,000 Units at 07/05/25 1206    sodium bicarbonate tablet 650 mg, 650 mg, Oral, BID after meals, Dylan Gillette MD    thiamine tablet 50 mg, 50 mg, Oral, Daily, ARIANE Prieto, 50 mg at 07/05/25 0830

## 2025-07-05 NOTE — ASSESSMENT & PLAN NOTE
Lab Results   Component Value Date    EGFR 17 07/05/2025    EGFR 16 07/04/2025    EGFR 16 07/03/2025    CREATININE 3.75 (H) 07/05/2025    CREATININE 3.94 (H) 07/04/2025    CREATININE 3.93 (H) 07/03/2025

## 2025-07-05 NOTE — PROGRESS NOTES
Progress Note - Gastroenterology   Name: Wes Araujo 55 y.o. male I MRN: [id number]  Unit/Bed#: E4 -01 I Date of Admission: 7/1/2025   Date of Service: 7/5/2025 I Hospital Day: 3    Assessment & Plan  Alcoholic hepatitis with ascites  55-year-old male with a past medical history of hypertension, diabetes mellitus type 2, chronic pancreatitis secondary to chronic alcohol use and recent admission for alc hep with likely ZENAIDA on CKD (creatinine 3.01 at discharge during previous hospitalization, previously 0.8-1 in 2024 but more recently 1.7-2) who presented to [facility] on [facility] for concern of recurrent abdominal distension with associated pain in the setting of constipation.   Labs on presentation significant for continued improvement in AST/ALT/ALP since last admission, however, bilirubin continues to increase.  INR 1.18 on presentation (peaked during prior admission at 5.53 with dramatic improvement after Vitamin K). Kidney function has been progressively worsening since last hospitalization with new peak Cr 3.93 with severe hyperkalemia.     Patient previously underwent serologic liver workup negative for alternative cause of liver injury with elevations thought to be 2/2 alc hep in setting of some degree of underlying fibrosis.  Patient was treated with conservative management without steroids due to low Maddrey's score.  Patient was recommended to follow-up with gastroenterology but now presenting with recurrent abdominal distention/pain. Last hospitalization 6/12-6/20 further complicated by continued abdominal pain likely in the setting of constipation as well as worsening ZENAIDA with concern for HRS now on albumin.    Maddrey's Discriminant Function - Control Prothrombin 13: 40.67 at 7/2/2025 12:28 AM  Calculated from:  Total Bilirubin: 24.11 mg/dL at 7/2/2025 12:28 AM  Prothrombin Time: 16.6 seconds at 7/1/2025  8:26 PM  MDF = (4.6 * (PT - Control PT)) + Bilirubin     MELD 3.0: 36 at 7/3/2025 11:03 AM  MELD-Na:  35 at 7/3/2025 11:03 AM  Calculated from:  Serum Creatinine: 3.93 mg/dL (Using max of 3 mg/dL) at 7/3/2025 11:03 AM  Serum Sodium: 134 mmol/L at 7/3/2025 11:03 AM  Total Bilirubin: 25 mg/dL at 7/3/2025 11:03 AM  Serum Albumin: 4.5 g/dL (Using max of 3.5 g/dL) at 7/3/2025 11:03 AM  INR(ratio): 1.33 at 7/1/2025  8:26 PM  Age at listing (hypothetical): 55 years  Sex: Male at 7/3/2025 11:03 AM    # Elevated Liver Chemistries - Alcohol hepatitis:   Monitor and trend LFTs daily along with INR  Avoid hepatotoxic medications, strongly encourage complete alcohol cessation  Additional pain and symptom management per primary team  Given progression of elevated LFT's and failure to improve with associated decline in renal function, discussed case with [facility] who has agree to accept patient for liver transplant evaluation..  Awaiting transfer pending bed availability     # Ascites with ZENAIDA/CKD:   Paracentesis completed 7/3/2025 with removal of 5270 cc of clear yellow fluid negative for SBP  Nephrology following - appreciate recommendations  Cr stable-currently albumin, previously on improvement with HRS treatment  Monitor and trend serum creatinine and electrolytes daily, monitor urine output     # History of Coffee Ground Emesis and Melena:  Status post EGD 6/13/2025 with evidence of grade D esophagitis; history of gastric dieulafoy lesion as well 2/2025  Continue on Protonix 40 mg twice daily by mouth  Monitor and trend hemoglobin, transfuse for goal greater than 7  Alert GI team of any concern for recurrent GI bleeding     # Transplant  Patient with history of alcoholic hepatitis and now concern for HRS which may potentiate need for transplant.   Peth from 6/18 negative - patient reports abstinence since first admission with concern for alc hep  Has adequate family social support  Ambulates with rolling walker, able to ambulate at home on his own  No previous DUIs or legal trouble associated with drinking.  No  previous stays in alcohol rehabilitation  He has multiple hospitalizations each year in the setting of alcohol use with encephalopathy, hypoglycemia as well as more recently alcoholic hepatitis.   Case previously reviewed with Dr. Yola Paige, hepatology, who stated although HRS less likely with elevated urine sodium, it would be reasonable to reach out to [facility] to discuss for potential transfer for transplant evaluation, but only if patient understanding of severity of condition and agreeable to transfer.  Discussed with patient at bedside today who is agreeable to transfer and transplant evaluation.  Reviewed with patient's brother via telephone and then with both patient and brother in person at bedside.  Reviewed current liver and kidney disease and reason for transfer.  Advised that on arrival, patient would undergo multiple tests to evaluate for liver transplant candidacy and determination of timing of possible liver transplant.  We did discuss that although patient is being transferred for evaluation, this does not guarantee liver transplant.  Answered all questions to the best of my ability, and advised that our team would remain available to them even after transfer for any additional questions or concerns.   Acute renal failure superimposed on stage 4 chronic kidney disease (HCC)  Lab Results   Component Value Date    EGFR 17 07/05/2025    EGFR 16 07/04/2025    EGFR 16 07/03/2025    CREATININE 3.75 (H) 07/05/2025    CREATININE 3.94 (H) 07/04/2025    CREATININE 3.93 (H) 07/03/2025     Alcohol-induced chronic pancreatitis (HCC)    Alcohol abuse  Sober since admission for alcoholic hepatitis (almost 2 months per his report. Peth negative 6/18  Hepatic encephalopathy (HCC)        Subjective   History was obtained via .  Patient denies any abdominal pain, nausea or vomiting.  He is aware that we are waiting bed availability at [facility].    Objective :  Temp:  [97.5 °F  (36.4 °C)-98.3 °F (36.8 °C)] 98.3 °F (36.8 °C)  HR:  [70-78] 72  BP: (147-162)/() 151/79  Resp:  [18] 18  SpO2:  [99 %-100 %] 99 %  O2 Device: None (Room air)    Physical Exam  Vitals and nursing note reviewed.   Constitutional:       General: He is not in acute distress.     Appearance: He is well-developed.   HENT:      Head: Normocephalic and atraumatic.     Eyes:      Conjunctiva/sclera: Conjunctivae normal.       Cardiovascular:      Rate and Rhythm: Normal rate and regular rhythm.      Heart sounds: No murmur heard.  Pulmonary:      Effort: Pulmonary effort is normal. No respiratory distress.      Breath sounds: Normal breath sounds.   Abdominal:      Palpations: Abdomen is soft.      Tenderness: There is no abdominal tenderness.     Musculoskeletal:         General: No swelling.      Cervical back: Neck supple.     Skin:     General: Skin is warm and dry.      Capillary Refill: Capillary refill takes less than 2 seconds.      Coloration: Skin is jaundiced.     Neurological:      Mental Status: He is alert and oriented to person, place, and time.     Psychiatric:         Mood and Affect: Mood normal.           Lab Results: I have reviewed the following results:CBC/BMP:   .     07/05/25  0630 07/05/25  0907   WBC  --  14.04*   HGB  --  9.1*   HCT  --  26.0*   PLT  --  217   SODIUM 137  --    K 4.7  --      --    CO2 23  --    BUN 46*  --    CREATININE 3.75*  --    GLUC 107  --     , LFTs:   .     07/05/25  0630   AST 66*   ALT 26   ALB 3.5   TBILI 18.71*   ALKPHOS 240*    , PTT/INR:No new results in last 24 hours.     Imaging Results Review: No pertinent imaging studies reviewed.  Other Study Results Review: No additional pertinent studies reviewed.    Skylar Long MD  Gastroenterology Fellow, PGY- 4  Available on EPIC Secure Chat  7/5/2025 4:20 PM

## 2025-07-05 NOTE — PLAN OF CARE
Problem: SAFETY ADULT  Goal: Patient will remain free of falls  Description: INTERVENTIONS:  - Educate patient/family on patient safety including physical limitations  - Instruct patient to call for assistance with activity   - Consider consulting OT/PT to assist with strengthening/mobility based on AM PAC & JH-HLM score  - Consult OT/PT to assist with strengthening/mobility   - Keep Call bell within reach  - Keep bed low and locked with side rails adjusted as appropriate  - Keep care items and personal belongings within reach  - Initiate and maintain comfort rounds  - Make Fall Risk Sign visible to staff  - Offer Toileting every 2 Hours, in advance of need  - Initiate/Maintain bed alarm  - Obtain necessary fall risk management equipment: alarm  - Apply yellow socks and bracelet for high fall risk patients  - Consider moving patient to room near nurses station  Outcome: Progressing  Goal: Maintain or return to baseline ADL function  Description: INTERVENTIONS:  -  Assess patient's ability to carry out ADLs; assess patient's baseline for ADL function and identify physical deficits which impact ability to perform ADLs (bathing, care of mouth/teeth, toileting, grooming, dressing, etc.)  - Assess/evaluate cause of self-care deficits   - Assess range of motion  - Assess patient's mobility; develop plan if impaired  - Assess patient's need for assistive devices and provide as appropriate  - Encourage maximum independence but intervene and supervise when necessary  - Involve family in performance of ADLs  - Assess for home care needs following discharge   - Consider OT consult to assist with ADL evaluation and planning for discharge  - Provide patient education as appropriate  - Monitor functional capacity and physical performance, use of AM PAC & JH-HLM   - Monitor gait, balance and fatigue with ambulation    Outcome: Progressing  Goal: Maintains/Returns to pre admission functional level  Description: INTERVENTIONS:  -  Perform AM-PAC 6 Click Basic Mobility/ Daily Activity assessment daily.  - Set and communicate daily mobility goal to care team and patient/family/caregiver.   - Collaborate with rehabilitation services on mobility goals if consulted  - Perform Range of Motion 3 times a day.  - Reposition patient every 2 hours.  - Dangle patient 3 times a day  - Stand patient 3 times a day  - Ambulate patient 3 times a day  - Out of bed to chair 3 times a day   - Out of bed for meals 3 times a day  - Out of bed for toileting  - Record patient progress and toleration of activity level   Outcome: Progressing     Problem: PAIN - ADULT  Goal: Verbalizes/displays adequate comfort level or baseline comfort level  Description: Interventions:  - Encourage patient to monitor pain and request assistance  - Assess pain using appropriate pain scale  - Administer analgesics as ordered based on type and severity of pain and evaluate response  - Implement non-pharmacological measures as appropriate and evaluate response  - Consider cultural and social influences on pain and pain management  - Notify physician/advanced practitioner if interventions unsuccessful or patient reports new pain  - Educate patient/family on pain management process including their role and importance of  reporting pain   - Provide non-pharmacologic/complimentary pain relief interventions  Outcome: Progressing     Problem: DISCHARGE PLANNING  Goal: Discharge to home or other facility with appropriate resources  Description: INTERVENTIONS:  - Identify barriers to discharge w/patient and caregiver  - Arrange for needed discharge resources and transportation as appropriate  - Identify discharge learning needs (meds, wound care, etc.)  - Arrange for interpretive services to assist at discharge as needed  - Refer to Case Management Department for coordinating discharge planning if the patient needs post-hospital services based on physician/advanced practitioner order or complex  needs related to functional status, cognitive ability, or social support system  Outcome: Progressing

## 2025-07-05 NOTE — ASSESSMENT & PLAN NOTE
Currently not hypotensive.  Continue antihypertensive regimen  Pleat IV albumin  If blood pressure persistently elevated can increase amlodipine to 10 mg   [Subsequent Evaluation] : a subsequent evaluation for [Tinnitus] : tinnitus [FreeTextEntry2] : ear clogging / pressure / rhinitis

## 2025-07-05 NOTE — ASSESSMENT & PLAN NOTE
Avoid nephrotoxins and hypotension  Appreciate nephrology recommendations  S/p albumin   Restart bicarb tablets 3 times daily  Creatinine improved   Lab Results   Component Value Date    EGFR 17 07/05/2025    EGFR 16 07/04/2025    EGFR 16 07/03/2025    CREATININE 3.75 (H) 07/05/2025    CREATININE 3.94 (H) 07/04/2025    CREATININE 3.93 (H) 07/03/2025

## 2025-07-05 NOTE — PLAN OF CARE
Problem: SAFETY ADULT  Goal: Patient will remain free of falls  Description: INTERVENTIONS:  - Educate patient/family on patient safety including physical limitations  - Instruct patient to call for assistance with activity   - Consider consulting OT/PT to assist with strengthening/mobility based on AM PAC & JH-HLM score  - Consult OT/PT to assist with strengthening/mobility   - Keep Call bell within reach  - Keep bed low and locked with side rails adjusted as appropriate  - Keep care items and personal belongings within reach  - Initiate and maintain comfort rounds  - Make Fall Risk Sign visible to staff  - Offer Toileting every 2 Hours, in advance of need  - Initiate/Maintain bed alarm  - Obtain necessary fall risk management equipment: alarm  - Apply yellow socks and bracelet for high fall risk patients  - Consider moving patient to room near nurses station  Outcome: Progressing  Goal: Maintain or return to baseline ADL function  Description: INTERVENTIONS:  -  Assess patient's ability to carry out ADLs; assess patient's baseline for ADL function and identify physical deficits which impact ability to perform ADLs (bathing, care of mouth/teeth, toileting, grooming, dressing, etc.)  - Assess/evaluate cause of self-care deficits   - Assess range of motion  - Assess patient's mobility; develop plan if impaired  - Assess patient's need for assistive devices and provide as appropriate  - Encourage maximum independence but intervene and supervise when necessary  - Involve family in performance of ADLs  - Assess for home care needs following discharge   - Consider OT consult to assist with ADL evaluation and planning for discharge  - Provide patient education as appropriate  - Monitor functional capacity and physical performance, use of AM PAC & JH-HLM   - Monitor gait, balance and fatigue with ambulation    Outcome: Progressing  Goal: Maintains/Returns to pre admission functional level  Description: INTERVENTIONS:  -  Perform AM-PAC 6 Click Basic Mobility/ Daily Activity assessment daily.  - Set and communicate daily mobility goal to care team and patient/family/caregiver.   - Collaborate with rehabilitation services on mobility goals if consulted  - Perform Range of Motion 3 times a day.  - Reposition patient every 2 hours.  - Dangle patient 3 times a day  - Stand patient 3 times a day  - Ambulate patient 3 times a day  - Out of bed to chair 3 times a day   - Out of bed for meals 3 times a day  - Out of bed for toileting  - Record patient progress and toleration of activity level   Outcome: Progressing     Problem: DISCHARGE PLANNING  Goal: Discharge to home or other facility with appropriate resources  Description: INTERVENTIONS:  - Identify barriers to discharge w/patient and caregiver  - Arrange for needed discharge resources and transportation as appropriate  - Identify discharge learning needs (meds, wound care, etc.)  - Arrange for interpretive services to assist at discharge as needed  - Refer to Case Management Department for coordinating discharge planning if the patient needs post-hospital services based on physician/advanced practitioner order or complex needs related to functional status, cognitive ability, or social support system  Outcome: Progressing     Problem: PAIN - ADULT  Goal: Verbalizes/displays adequate comfort level or baseline comfort level  Description: Interventions:  - Encourage patient to monitor pain and request assistance  - Assess pain using appropriate pain scale  - Administer analgesics as ordered based on type and severity of pain and evaluate response  - Implement non-pharmacological measures as appropriate and evaluate response  - Consider cultural and social influences on pain and pain management  - Notify physician/advanced practitioner if interventions unsuccessful or patient reports new pain  - Educate patient/family on pain management process including their role and importance of   reporting pain   - Provide non-pharmacologic/complimentary pain relief interventions  Outcome: Progressing

## 2025-07-05 NOTE — ASSESSMENT & PLAN NOTE
Wes Araujo is a 55-year-old male with a past medical history of hypertension, diabetes mellitus type 2, chronic pancreatitis secondary to chronic alcohol use and recent admission for alc hep with likely ZENAIDA on CKD who presented to [facility] on 7/1 for concern of recurrent abdominal distension and inability to eat due to distention.  Patient recently admitted to [facility] (6/12 to 6/20) for the same and ultimately left AMA.     GI consulted, appreciate the assistance  Last paracentesis 6/18, 5700 mL of ascites removed  Ir consulted for paracentesis- s/p paracentesis for 5,270 ml   Continue ceftriaxone day 4/5  CT abdomen/pelvis showing: Sequela of chronic pancreatitis again seen. Mild wall thickening of multiple abdominal/pelvic small bowel loops as well as portions of the colon likely reactive in the setting of ascites. Can not exclude possibility of mild diffuse enterocolitis. No evidence of bowel obstruction, appendicitis, or free air. Large volume of abdominal/pelvic ascites again noted, similar compared to the prior exam.  Pt was accepted at [facility]. Awaiting transfer     Lab Results   Component Value Date    ALT 26 07/05/2025    AST 66 (H) 07/05/2025    GGT 1,128 (H) 05/24/2025    ALKPHOS 240 (H) 07/05/2025    TBILI 18.71 (H) 07/05/2025          Pamela's Discriminant Function - Control Prothrombin 13: 40.67 at 7/2/2025 12:28 AM  Calculated from:  Total Bilirubin: 24.11 mg/dL at 7/2/2025 12:28 AM  Prothrombin Time: 16.6 seconds at 7/1/2025  8:26 PM  MDF = (4.6 * (PT - Control PT)) + Bilirubin     MELD 3.0: 36 at 7/3/2025 11:03 AM  MELD-Na: 35 at 7/3/2025 11:03 AM  Calculated from:  Serum Creatinine: 3.93 mg/dL (Using max of 3 mg/dL) at 7/3/2025 11:03 AM  Serum Sodium: 134 mmol/L at 7/3/2025 11:03 AM  Total Bilirubin: 25 mg/dL at 7/3/2025 11:03 AM  Serum Albumin: 4.5 g/dL (Using max of 3.5 g/dL) at 7/3/2025 11:03 AM  INR(ratio): 1.33 at 7/1/2025  8:26 PM  Age at listing (hypothetical): 55 years  Sex:  Male at 7/3/2025 11:03 AM

## 2025-07-05 NOTE — PROGRESS NOTES
Progress Note - Hospitalist   Name: Wes Araujo 55 y.o. male I MRN: [id number]  Unit/Bed#: E4 -01 I Date of Admission: 7/1/2025   Date of Service: 7/5/2025 I Hospital Day: 3    Assessment & Plan  Primary hypertension  Blood pressure currently 173/93  continue Coreg, hydralazine and amlodipine at previous doses  Routine vital signs  continue prn hydralazine   Type 2 diabetes mellitus with hyperglycemia, with long-term current use of insulin (HCC)  Since patient left AMA recently he has not taken any of his insulin or oral medications  Continue 70/30 insulin 8 units twice daily but concern for hypoglycemia. Will write for lantus 5 units while in the hospital. His blood glucose is 130s this am and he has slight confusion. Will hold lantus and monitor   Sliding scale insulin  ACHS glucose checks  Diabetic diet  Hypoglycemia protocol    Lab Results   Component Value Date    HGBA1C 9.6 (H) 05/26/2025     Alcoholic hepatitis with ascites  Wes Araujo is a 55-year-old male with a past medical history of hypertension, diabetes mellitus type 2, chronic pancreatitis secondary to chronic alcohol use and recent admission for alc hep with likely ZENAIDA on CKD who presented to [facility] on 7/1 for concern of recurrent abdominal distension and inability to eat due to distention.  Patient recently admitted to [facility] (6/12 to 6/20) for the same and ultimately left AMA.     GI consulted, appreciate the assistance  Last paracentesis 6/18, 5700 mL of ascites removed  Ir consulted for paracentesis- s/p paracentesis for 5,270 ml   Continue ceftriaxone day 4/5  CT abdomen/pelvis showing: Sequela of chronic pancreatitis again seen. Mild wall thickening of multiple abdominal/pelvic small bowel loops as well as portions of the colon likely reactive in the setting of ascites. Can not exclude possibility of mild diffuse enterocolitis. No evidence of bowel obstruction, appendicitis, or free air. Large volume of abdominal/pelvic  ascites again noted, similar compared to the prior exam.  Pt was accepted at [facility]. Awaiting transfer     Lab Results   Component Value Date    ALT 26 07/05/2025    AST 66 (H) 07/05/2025    GGT 1,128 (H) 05/24/2025    ALKPHOS 240 (H) 07/05/2025    TBILI 18.71 (H) 07/05/2025          Pamela's Discriminant Function - Control Prothrombin 13: 40.67 at 7/2/2025 12:28 AM  Calculated from:  Total Bilirubin: 24.11 mg/dL at 7/2/2025 12:28 AM  Prothrombin Time: 16.6 seconds at 7/1/2025  8:26 PM  MDF = (4.6 * (PT - Control PT)) + Bilirubin     MELD 3.0: 36 at 7/3/2025 11:03 AM  MELD-Na: 35 at 7/3/2025 11:03 AM  Calculated from:  Serum Creatinine: 3.93 mg/dL (Using max of 3 mg/dL) at 7/3/2025 11:03 AM  Serum Sodium: 134 mmol/L at 7/3/2025 11:03 AM  Total Bilirubin: 25 mg/dL at 7/3/2025 11:03 AM  Serum Albumin: 4.5 g/dL (Using max of 3.5 g/dL) at 7/3/2025 11:03 AM  INR(ratio): 1.33 at 7/1/2025  8:26 PM  Age at listing (hypothetical): 55 years  Sex: Male at 7/3/2025 11:03 AM    Paroxysmal A-fib (HCC)  History of paroxysmal atrial fibrillation  Restart Coreg 6.25 mg twice daily  Not on anticoagulation  Acute renal failure superimposed on stage 4 chronic kidney disease (HCC)  Avoid nephrotoxins and hypotension  Appreciate nephrology recommendations  S/p albumin   Restart bicarb tablets 3 times daily  Creatinine improved   Lab Results   Component Value Date    EGFR 17 07/05/2025    EGFR 16 07/04/2025    EGFR 16 07/03/2025    CREATININE 3.75 (H) 07/05/2025    CREATININE 3.94 (H) 07/04/2025    CREATININE 3.93 (H) 07/03/2025     Alcohol-induced chronic pancreatitis (HCC)  Noted again on CAT scan  Restart Creon 24,000 units 3 times a day with meals  Pt has been abstinent from alcohol for at least 3 months      Alcohol abuse  H/o  Patient reports that he is not currently drinking and has not drank since prior to his previous admission  Nonetheless, monitor for signs of withdrawal  Continue thiamine/folate supplementation  Moderate  protein-calorie malnutrition (HCC)  Malnutrition Findings:   Adult Malnutrition type: Acute illness  Adult Degree of Malnutrition: Malnutrition of moderate degree  Malnutrition Characteristics: Fat loss, Muscle loss, Inadequate energy                  360 Statement: Severe calorie protein malnutrition in the setting of acute illness r/t inadequte PO intake in setting of abdominal distention/ pain as evidenced by:  moderate body fat (triceps, ribcage area) and muscle mass depletions (temporal wasting, protruding clavicles) energy intake less than 50% compared to estimated needs>5 days; Treated with oral supplements    BMI Findings:           Body mass index is 18.3 kg/m².     Low bicarbonate level    Persistent proteinuria    Hepatic encephalopathy (HCC)  Pt is confused. He is oriented x1 person  Reports no bm for several days  Started on lactulose  Check ammonia level       VTE Pharmacologic Prophylaxis: VTE Score: 1 contraindicated     Mobility:   Basic Mobility Inpatient Raw Score: 16  JH-HLM Goal: 5: Stand one or more mins  JH-HLM Achieved: 2: Bed activities/Dependent transfer      Patient Centered Rounds: I performed bedside rounds with nursing staff today.     Education and Discussions with Family / Patient: Updated  (father) at bedside.    Current Length of Stay: 3 day(s)  Current Patient Status: Inpatient   Certification Statement: The patient will continue to require additional inpatient hospital stay due to decompensated cirrhosis, gerda. Awaiting transfer to [facility]   Discharge Plan: awaiting transfer to [facility]     Code Status: Level 1 - Full Code    Subjective   Pt seen and examined. Used cryacrom 066133. Pt is confused today. He did state no abd pain but no bm for 5 days.     Objective :  Temp:  [97.5 °F (36.4 °C)-98.3 °F (36.8 °C)] 98.3 °F (36.8 °C)  HR:  [70-78] 72  BP: (147-162)/() 151/79  Resp:  [18] 18  SpO2:  [99 %-100 %] 99 %  O2 Device: None (Room air)    Body mass index is  18.3 kg/m².     Input and Output Summary (last 24 hours):     Intake/Output Summary (Last 24 hours) at 7/5/2025 0853  Last data filed at 7/4/2025 0859  Gross per 24 hour   Intake 240 ml   Output --   Net 240 ml       Physical Exam  Constitutional:       Appearance: He is ill-appearing.   HENT:      Head: Normocephalic and atraumatic.     Eyes:      General: Scleral icterus present.      Extraocular Movements: Extraocular movements intact.      Pupils: Pupils are equal, round, and reactive to light.       Cardiovascular:      Rate and Rhythm: Normal rate and regular rhythm.      Heart sounds: No murmur heard.     No friction rub. No gallop.   Pulmonary:      Effort: Pulmonary effort is normal. No respiratory distress.      Breath sounds: Normal breath sounds. No wheezing, rhonchi or rales.   Abdominal:      General: There is no distension.      Palpations: Abdomen is soft.      Tenderness: There is no abdominal tenderness. There is no guarding or rebound.     Skin:     Coloration: Skin is jaundiced.     Neurological:      Mental Status: He is alert and oriented to person, place, and time.       Lines/Drains:      Lab Results: I have reviewed the following results:   Results from last 7 days   Lab Units 07/04/25  0549 07/02/25  0028   WBC Thousand/uL 13.97* 17.38*   HEMOGLOBIN g/dL 9.0* 10.9*   HEMATOCRIT % 26.0* 32.2*   PLATELETS Thousands/uL 206 276   SEGS PCT %  --  79*   LYMPHO PCT %  --  11*   MONO PCT %  --  8   EOS PCT %  --  1     Results from last 7 days   Lab Units 07/05/25  0630   SODIUM mmol/L 137   POTASSIUM mmol/L 4.7   CHLORIDE mmol/L 103   CO2 mmol/L 23   BUN mg/dL 46*   CREATININE mg/dL 3.75*   ANION GAP mmol/L 11   CALCIUM mg/dL 8.9   ALBUMIN g/dL 3.5   TOTAL BILIRUBIN mg/dL 18.71*   ALK PHOS U/L 240*   ALT U/L 26   AST U/L 66*   GLUCOSE RANDOM mg/dL 107     Results from last 7 days   Lab Units 07/01/25 2026   INR  1.33*     Results from last 7 days   Lab Units 07/05/25  0728 07/04/25 2107  07/04/25  1541 07/04/25  1124 07/04/25  0721 07/03/25  2053 07/03/25  1611 07/03/25  1020 07/03/25  0708 07/02/25  2111 07/02/25  1620 07/02/25  1059   POC GLUCOSE mg/dl 132 128 157* 222* 226* 245* 271* 220* 264* 190* 132 168*               Recent Cultures (last 7 days):   Results from last 7 days   Lab Units 07/03/25  0906   GRAM STAIN RESULT  1+ Polys  No organisms seen   BODY FLUID CULTURE, STERILE  No growth       Imaging Results Review: No pertinent imaging studies reviewed.  Other Study Results Review: No additional pertinent studies reviewed.    Last 24 Hours Medication List:     Current Facility-Administered Medications:     acetaminophen (TYLENOL) tablet 650 mg, Q6H PRN    albumin human (FLEXBUMIN) 25 % injection 12.5 g, Q8H    aluminum-magnesium hydroxide-simethicone (MAALOX) oral suspension 30 mL, Q6H PRN    amLODIPine (NORVASC) tablet 5 mg, Daily    cefTRIAXone (ROCEPHIN) 1,000 mg in dextrose 5 % 50 mL IVPB, Q24H, Last Rate: 1,000 mg (07/04/25 1254)    folic acid (FOLVITE) tablet 1 mg, Daily    hydrALAZINE (APRESOLINE) injection 5 mg, Q6H PRN    HYDROmorphone HCl (DILAUDID) injection 0.2 mg, Q8H PRN    insulin glargine (LANTUS) subcutaneous injection 5 Units 0.05 mL, QAM    insulin lispro (HumALOG/ADMELOG) 100 units/mL subcutaneous injection 1-5 Units, 4x Daily (AC & HS) **AND** Fingerstick Glucose (POCT), 4x Daily AC and at bedtime    lactulose (CHRONULAC) oral solution 20 g, TID    ondansetron (ZOFRAN) injection 4 mg, Q6H PRN    pancrelipase (Lip-Prot-Amyl) (CREON) delayed release capsule 24,000 Units, TID With Meals    sodium bicarbonate tablet 1,300 mg, BID after meals    thiamine tablet 50 mg, Daily    Administrative Statements   Today, Patient Was Seen By: Stephani Casanova DO

## 2025-07-05 NOTE — ASSESSMENT & PLAN NOTE
Since patient left AMA recently he has not taken any of his insulin or oral medications  Continue 70/30 insulin 8 units twice daily but concern for hypoglycemia. Will write for lantus 5 units while in the hospital. His blood glucose is 130s this am and he has slight confusion. Will hold lantus and monitor   Sliding scale insulin  ACHS glucose checks  Diabetic diet  Hypoglycemia protocol    Lab Results   Component Value Date    HGBA1C 9.6 (H) 05/26/2025

## 2025-07-05 NOTE — ASSESSMENT & PLAN NOTE
Pt is confused. He is oriented x1 person  Reports no bm for several days  Started on lactulose  Check ammonia level

## 2025-07-05 NOTE — ASSESSMENT & PLAN NOTE
55-year-old male with a past medical history of hypertension, diabetes mellitus type 2, chronic pancreatitis secondary to chronic alcohol use and recent admission for alc hep with likely ZENAIDA on CKD (creatinine 3.01 at discharge during previous hospitalization, previously 0.8-1 in 2024 but more recently 1.7-2) who presented to [facility] on 6/12 for concern of recurrent abdominal distension with associated pain in the setting of constipation.   Labs on presentation significant for continued improvement in AST/ALT/ALP since last admission, however, bilirubin continues to increase.  INR 1.18 on presentation (peaked during prior admission at 5.53 with dramatic improvement after Vitamin K). Kidney function has been progressively worsening since last hospitalization with new peak Cr 3.93 with severe hyperkalemia.     Patient previously underwent serologic liver workup negative for alternative cause of liver injury with elevations thought to be 2/2 alc hep in setting of some degree of underlying fibrosis.  Patient was treated with conservative management without steroids due to low Maddrey's score.  Patient was recommended to follow-up with gastroenterology but now presenting with recurrent abdominal distention/pain. Last hospitalization 6/12-6/20 further complicated by continued abdominal pain likely in the setting of constipation as well as worsening ZENAIDA with concern for HRS now on albumin.    Maddrey's Discriminant Function - Control Prothrombin 13: 40.67 at 7/2/2025 12:28 AM  Calculated from:  Total Bilirubin: 24.11 mg/dL at 7/2/2025 12:28 AM  Prothrombin Time: 16.6 seconds at 7/1/2025  8:26 PM  MDF = (4.6 * (PT - Control PT)) + Bilirubin     MELD 3.0: 36 at 7/3/2025 11:03 AM  MELD-Na: 35 at 7/3/2025 11:03 AM  Calculated from:  Serum Creatinine: 3.93 mg/dL (Using max of 3 mg/dL) at 7/3/2025 11:03 AM  Serum Sodium: 134 mmol/L at 7/3/2025 11:03 AM  Total Bilirubin: 25 mg/dL at 7/3/2025 11:03 AM  Serum Albumin: 4.5 g/dL (Using  max of 3.5 g/dL) at 7/3/2025 11:03 AM  INR(ratio): 1.33 at 7/1/2025  8:26 PM  Age at listing (hypothetical): 55 years  Sex: Male at 7/3/2025 11:03 AM    # Elevated Liver Chemistries - Alcohol hepatitis:   Monitor and trend LFTs daily along with INR  Avoid hepatotoxic medications, strongly encourage complete alcohol cessation  Additional pain and symptom management per primary team  Given progression of elevated LFT's and failure to improve with associated decline in renal function, discussed case with [facility] who has agree to accept patient for liver transplant evaluation..  Awaiting transfer pending bed availability     # Ascites with ZENAIDA/CKD:   Paracentesis completed 7/3/2025 with removal of 5270 cc of clear yellow fluid negative for SBP  Nephrology following - appreciate recommendations  Cr stable-currently albumin, previously on improvement with HRS treatment  Monitor and trend serum creatinine and electrolytes daily, monitor urine output     # History of Coffee Ground Emesis and Melena:  Status post EGD 6/13/2025 with evidence of grade D esophagitis; history of gastric dieulafoy lesion as well 2/2025  Continue on Protonix 40 mg twice daily by mouth  Monitor and trend hemoglobin, transfuse for goal greater than 7  Alert GI team of any concern for recurrent GI bleeding     # Transplant  Patient with history of alcoholic hepatitis and now concern for HRS which may potentiate need for transplant.   Peth from 6/18 negative - patient reports abstinence since first admission with concern for alc hep  Has adequate family social support  Ambulates with rolling walker, able to ambulate at home on his own  No previous DUIs or legal trouble associated with drinking.  No previous stays in alcohol rehabilitation  He has multiple hospitalizations each year in the setting of alcohol use with encephalopathy, hypoglycemia as well as more recently alcoholic hepatitis.   Case previously reviewed with Dr. Yola Paige,  hepatology, who stated although HRS less likely with elevated urine sodium, it would be reasonable to reach out to [facility] to discuss for potential transfer for transplant evaluation, but only if patient understanding of severity of condition and agreeable to transfer.  Discussed with patient at bedside today who is agreeable to transfer and transplant evaluation.  Reviewed with patient's brother via telephone and then with both patient and brother in person at bedside.  Reviewed current liver and kidney disease and reason for transfer.  Advised that on arrival, patient would undergo multiple tests to evaluate for liver transplant candidacy and determination of timing of possible liver transplant.  We did discuss that although patient is being transferred for evaluation, this does not guarantee liver transplant.  Answered all questions to the best of my ability, and advised that our team would remain available to them even after transfer for any additional questions or concerns.

## 2025-07-05 NOTE — ASSESSMENT & PLAN NOTE
ZENAIDA on CKD stage IV, overall fluctuating creatinine, baseline creatinine 1.8-2.1 since March 2025, episodes of ZENAIDA during recent hospitalizations noted, creatinine fluctuated mid to high twos in May 2025 and more recently during hospitalization 3.2-3.4 in June 2025  -Renal function has improved today down to 3.7 mg/dL getting close to most recent baseline  -Awaiting transfer to [facility]  -ZENAIDA could be in the setting of decreased effective intravascular volume with multiple hyaline casts on UA in the recent past, incomplete recovery from recent ZENAIDA, component of HRS remains differential versus overall progression of CKD given uncontrolled diabetes  -Recent UA in June 2025 showed 3+ proteinuria, no hematuria with multiple hyaline cast.  -Follow results for repeat UA with microscopy, urine electrolytes.    -No hydro on CT scan.  Renal ultrasound in June 2025 showed normal-sized kidneys, somewhat echogenic renal parenchyma in both kidneys  -Status post paracentesis  -Check a Cystatin C level tomorrow  -Continue to hold diuretic  - Complete colloid expansion with IV albumin  -Daily BMP

## 2025-07-06 LAB
ATRIAL RATE: 72 BPM
BACTERIA SPEC BFLD CULT: NO GROWTH
GRAM STN SPEC: NORMAL
GRAM STN SPEC: NORMAL
P AXIS: 53 DEGREES
PR INTERVAL: 136 MS
QRS AXIS: -14 DEGREES
QRSD INTERVAL: 90 MS
QT INTERVAL: 456 MS
QTC INTERVAL: 500 MS
T WAVE AXIS: -4 DEGREES
VENTRICULAR RATE: 72 BPM

## 2025-07-06 PROCEDURE — 93010 ELECTROCARDIOGRAM REPORT: CPT | Performed by: INTERNAL MEDICINE

## 2025-07-06 NOTE — ASSESSMENT & PLAN NOTE
Wes Araujo is a 55-year-old male with a past medical history of hypertension, diabetes mellitus type 2, chronic pancreatitis secondary to chronic alcohol use and recent admission for alc hep with likely ZENAIDA on CKD who presented to [facility] on 7/1 for concern of recurrent abdominal distension and inability to eat due to distention.  Patient recently admitted to [facility] (6/12 to 6/20) for the same and ultimately left AMA.     GI consulted, appreciate the assistance  Last paracentesis 6/18, 5700 mL of ascites removed  Ir consulted for paracentesis- s/p paracentesis for 5,270 ml   Continue ceftriaxone day 4/5  CT abdomen/pelvis showing: Sequela of chronic pancreatitis again seen. Mild wall thickening of multiple abdominal/pelvic small bowel loops as well as portions of the colon likely reactive in the setting of ascites. Can not exclude possibility of mild diffuse enterocolitis. No evidence of bowel obstruction, appendicitis, or free air. Large volume of abdominal/pelvic ascites again noted, similar compared to the prior exam.  Pt was accepted at [facility]. Awaiting transfer     Lab Results   Component Value Date    ALT 26 07/05/2025    AST 66 (H) 07/05/2025    GGT 1,128 (H) 05/24/2025    ALKPHOS 240 (H) 07/05/2025    TBILI 18.71 (H) 07/05/2025          Pamela's Discriminant Function - Control Prothrombin 13: 40.67 at 7/2/2025 12:28 AM  Calculated from:  Total Bilirubin: 24.11 mg/dL at 7/2/2025 12:28 AM  Prothrombin Time: 16.6 seconds at 7/1/2025  8:26 PM  MDF = (4.6 * (PT - Control PT)) + Bilirubin     MELD 3.0: 36 at 7/3/2025 11:03 AM  MELD-Na: 35 at 7/3/2025 11:03 AM  Calculated from:  Serum Creatinine: 3.93 mg/dL (Using max of 3 mg/dL) at 7/3/2025 11:03 AM  Serum Sodium: 134 mmol/L at 7/3/2025 11:03 AM  Total Bilirubin: 25 mg/dL at 7/3/2025 11:03 AM  Serum Albumin: 4.5 g/dL (Using max of 3.5 g/dL) at 7/3/2025 11:03 AM  INR(ratio): 1.33 at 7/1/2025  8:26 PM  Age at listing (hypothetical): 55 years  Sex:  Male at 7/3/2025 11:03 AM

## 2025-07-06 NOTE — NURSING NOTE
Pt discharged at 2030 via transport team. Pt given zofran at discharge. Iv line in left arm intact. All personal belongings taken with pt and no further questions from pt at this time. Report given to receiving facility by day shift nurse.

## 2025-07-06 NOTE — DISCHARGE SUMMARY
Discharge Summary - Hospitalist   Name: Wes Araujo 55 y.o. male I MRN: [id number]  Unit/Bed#: E4 -01 I Date of Admission: 7/1/2025   Date of Service: 7/5/2025 I Hospital Day: 3     Assessment & Plan  Primary hypertension  Blood pressure currently 173/93  continue Coreg, hydralazine and amlodipine at previous doses  Routine vital signs  continue prn hydralazine   Type 2 diabetes mellitus with hyperglycemia, with long-term current use of insulin (HCC)  Since patient left AMA recently he has not taken any of his insulin or oral medications  Continue 70/30 insulin 8 units twice daily but concern for hypoglycemia. Will write for lantus 5 units while in the hospital. His blood glucose is 130s this am and he has slight confusion. Will hold lantus and monitor   Sliding scale insulin  ACHS glucose checks  Diabetic diet  Hypoglycemia protocol    Lab Results   Component Value Date    HGBA1C 9.6 (H) 05/26/2025     Alcoholic hepatitis with ascites  Wes Araujo is a 55-year-old male with a past medical history of hypertension, diabetes mellitus type 2, chronic pancreatitis secondary to chronic alcohol use and recent admission for alc hep with likely ZENAIDA on CKD who presented to [facility] on 7/1 for concern of recurrent abdominal distension and inability to eat due to distention.  Patient recently admitted to [facility] (6/12 to 6/20) for the same and ultimately left AMA.     GI consulted, appreciate the assistance  Last paracentesis 6/18, 5700 mL of ascites removed  Ir consulted for paracentesis- s/p paracentesis for 5,270 ml   Continue ceftriaxone day 4/5  CT abdomen/pelvis showing: Sequela of chronic pancreatitis again seen. Mild wall thickening of multiple abdominal/pelvic small bowel loops as well as portions of the colon likely reactive in the setting of ascites. Can not exclude possibility of mild diffuse enterocolitis. No evidence of bowel obstruction, appendicitis, or free air. Large volume of  abdominal/pelvic ascites again noted, similar compared to the prior exam.  Pt was accepted at [facility]. Awaiting transfer     Lab Results   Component Value Date    ALT 26 07/05/2025    AST 66 (H) 07/05/2025    GGT 1,128 (H) 05/24/2025    ALKPHOS 240 (H) 07/05/2025    TBILI 18.71 (H) 07/05/2025          Pamela's Discriminant Function - Control Prothrombin 13: 40.67 at 7/2/2025 12:28 AM  Calculated from:  Total Bilirubin: 24.11 mg/dL at 7/2/2025 12:28 AM  Prothrombin Time: 16.6 seconds at 7/1/2025  8:26 PM  MDF = (4.6 * (PT - Control PT)) + Bilirubin     MELD 3.0: 36 at 7/3/2025 11:03 AM  MELD-Na: 35 at 7/3/2025 11:03 AM  Calculated from:  Serum Creatinine: 3.93 mg/dL (Using max of 3 mg/dL) at 7/3/2025 11:03 AM  Serum Sodium: 134 mmol/L at 7/3/2025 11:03 AM  Total Bilirubin: 25 mg/dL at 7/3/2025 11:03 AM  Serum Albumin: 4.5 g/dL (Using max of 3.5 g/dL) at 7/3/2025 11:03 AM  INR(ratio): 1.33 at 7/1/2025  8:26 PM  Age at listing (hypothetical): 55 years  Sex: Male at 7/3/2025 11:03 AM    Paroxysmal A-fib (HCC)  History of paroxysmal atrial fibrillation  Restart Coreg 6.25 mg twice daily  Not on anticoagulation  Acute renal failure superimposed on stage 4 chronic kidney disease (HCC)  Avoid nephrotoxins and hypotension  Appreciate nephrology recommendations  S/p albumin   Restart bicarb tablets 3 times daily  Creatinine improved   Lab Results   Component Value Date    EGFR 17 07/05/2025    EGFR 16 07/04/2025    EGFR 16 07/03/2025    CREATININE 3.75 (H) 07/05/2025    CREATININE 3.94 (H) 07/04/2025    CREATININE 3.93 (H) 07/03/2025     Alcohol-induced chronic pancreatitis (HCC)  Noted again on CAT scan  Restart Creon 24,000 units 3 times a day with meals  Pt has been abstinent from alcohol for at least 3 months      Alcohol abuse  H/o  Patient reports that he is not currently drinking and has not drank since prior to his previous admission  Nonetheless, monitor for signs of withdrawal  Continue thiamine/folate  supplementation  Moderate protein-calorie malnutrition (HCC)  Malnutrition Findings:   Adult Malnutrition type: Acute illness  Adult Degree of Malnutrition: Malnutrition of moderate degree  Malnutrition Characteristics: Fat loss, Muscle loss, Inadequate energy                  360 Statement: Severe calorie protein malnutrition in the setting of acute illness r/t inadequte PO intake in setting of abdominal distention/ pain as evidenced by:  moderate body fat (triceps, ribcage area) and muscle mass depletions (temporal wasting, protruding clavicles) energy intake less than 50% compared to estimated needs>5 days; Treated with oral supplements    BMI Findings:           Body mass index is 18.3 kg/m².     Low bicarbonate level    Persistent proteinuria    Hepatic encephalopathy (HCC)  Pt is confused. He is oriented x1 person  Reports no bm for several days  Started on lactulose  Check ammonia level        Medical Problems       Resolved Problems  Date Reviewed: 7/5/2025          Resolved    Hyperkalemia 7/4/2025     Resolved by  Dylan Gillette MD        Discharging Physician / Practitioner: Stephani Casanova DO  PCP: Rush Kebede MD  Admission Date:   Admission Orders (From admission, onward)       Ordered        07/02/25 0047  Inpatient Admission  Once                          Discharge Date: 07/05/25    Medication Discharge:   See after visit summary for reconciled discharge medications provided to patient and/or family.     Consultations During Hospital Stay:  Gi   Nephrology     Procedures Performed:   Paracentesis- 5,270ml     Hospital Course:   Wes Araujo is a 55 y.o. male patient who originally presented to the hospital on 7/1/2025 due to alcoholic hepatitis with ascites in which pt had not had further alcohol at least 2 months. He had a meld score of 35. He is s/p paracentesis and gi was following him. He was accepted at [facility] for liver transplant evaluation. In addition he was started on  lactulose for hepatic encephalopathy. He also had gerda with a creatinine of 3.7 in which nephrology was treated with albumin and holding his diuretic. His baseline is 1.8-2.1. family was informed of the acceptance to [facility] and he was discharged to [facility]     Please see above list of diagnoses and related plan for additional information.     Discharge Day Visit / Exam:   * Please refer to separate progress note for these details *      Discharge instructions/Information to patient and family:   See after visit summary for information provided to patient and family.      Provisions for Follow-Up Care:  See after visit summary for information related to follow-up care and any pertinent home health orders.      Mobility at time of Discharge:   Basic Mobility Inpatient Raw Score: 16  JH-HLM Goal: 5: Stand one or more mins  -HLM Achieved: 7: Walk 25 feet or more       Disposition:   Other: transfer to [facility]     Planned Readmission: transfer to admission for [facility]     Administrative Statements   Discharge Statement:  I have spent a total time of 40 minutes in caring for this patient on the day of the visit/encounter.

## 2025-07-07 ENCOUNTER — TRANSITIONAL CARE MANAGEMENT (OUTPATIENT)
Dept: FAMILY MEDICINE CLINIC | Facility: CLINIC | Age: 55
End: 2025-07-07

## 2025-07-07 NOTE — UTILIZATION REVIEW
NOTIFICATION OF ADMISSION DISCHARGE   This is a Notification of Discharge from [facility]. Please be advised that this patient has been discharge from our facility. Below you will find the admission and discharge date and time including the patient’s disposition.   UTILIZATION REVIEW CONTACT:  Utilization Review Assistants  Network Utilization Review Department  Phone: [phone] x carefully listen to the prompts. All voicemails are confidential.  Email: [email or web address]     ADMISSION INFORMATION  PRESENTATION DATE: 7/1/2025  7:16 PM  OBERVATION ADMISSION DATE: N/A  INPATIENT ADMISSION DATE: 7/2/25 12:47 AM   DISCHARGE DATE: 7/5/2025  8:30 PM   DISPOSITION:Non [facility]    Network Utilization Review Department  ATTENTION: Please call with any questions or concerns to [phone] and carefully listen to the prompts so that you are directed to the right person. All voicemails are confidential.   For Discharge needs, contact Care Management DC Support Team at [phone] opt. 2  Send all requests for admission clinical reviews, approved or denied determinations and any other requests to dedicated fax number below belonging to the campus where the patient is receiving treatment. List of dedicated fax numbers for the Facilities:  FACILITY NAME UR FAX NUMBER   ADMISSION DENIALS (Administrative/Medical Necessity) [phone]   DISCHARGE SUPPORT TEAM ([facility]) [phone]   PARENT CHILD HEALTH (Maternity/NICU/Pediatrics) [phone]   General acute hospital [phone]   [facility] [phone]   Counts include 234 beds at the [facility] [phone]   [facility] [phone]   [facility] [phone]   [facility] [phone]   [facility] [phone]   [facility] [phone]   [facility]  [facility] [phone]   [facility] [phone]   [facility] [phone]   [facility] [phone]

## 2025-07-09 LAB
DME PARACHUTE DELIVERY DATE ACTUAL: NORMAL
DME PARACHUTE DELIVERY DATE REQUESTED: NORMAL
DME PARACHUTE ITEM DESCRIPTION: NORMAL
DME PARACHUTE ORDER STATUS: NORMAL
DME PARACHUTE SUPPLIER NAME: NORMAL
DME PARACHUTE SUPPLIER PHONE: NORMAL

## 2025-07-11 ENCOUNTER — TELEPHONE (OUTPATIENT)
Dept: GASTROENTEROLOGY | Facility: MEDICAL CENTER | Age: 55
End: 2025-07-11

## 2025-07-11 NOTE — TELEPHONE ENCOUNTER
----- Message from Emely PAT sent at 7/11/2025  7:43 AM EDT -----  Regarding: FW: Recent creatinine for the past 3 months    ----- Message -----  From: Skylar Long MD  Sent: 7/10/2025   4:30 PM EDT  To: Nancy Kaur MD; Gastroenterology [facility]  Subject: FW: Recent creatinine for the past 3 months      Hi Team, can you please see the message below and fax the attached patient's creatinine for the past 3 months (April 2025 - July 2025) to Dr Orellana who is the transplant hepatologist at [facility] as soon as you can? He is being considered for liver and kidney transplant.       His fax number is [phone] and his email address is [email or web address]    Thank you  ----- Message -----  From: Susan Rice MD  Sent: 7/10/2025   3:37 PM EDT  To: Nancy Kaur MD; Skylar Long MD  Subject: Recent creatinine for the past 3 months          Hi previous [place] inpatient team!    Dr. Coy Orellana, transplant hepatologist, from [facility] reached out to me because their team was having difficulty finding the patient's recent creatinine for the past 3 months. This patient is being considered for both kidney and liver transplant and he was wondering if his creatinine levels for the past 3 months could be faxed over to them. I read it off for him but since I was not part of the patient's care team, I don't think I can technically or legally (not 100% sure) write a letter in his chart with his creatinine levels and send it over to Dr. Orellana so I was wondering, would you be able to help the [facility] team with the patient's creatinine level by any chance? He will likely reach out to the last attending involved at [facility] for the creatinine levels so I thought I would reach out to you guys who took care of the patient last at [place] to help expedite the patient's potential transplant evaluation.     Jcakelyn, perhaps would you be able to ask the clinic staff member to fax over the creatinine for the past 3  months (April 2025 - July 2025) to him by any chance? His fax number is [phone] and his email address is [email or web address]    Thank you guys!!     Anderson,  Chel

## (undated) DEVICE — VIAL DECANTER

## (undated) DEVICE — DENTAL BURR SIDE WHITE

## (undated) DEVICE — STERILE MANDIBLE PACK: Brand: CARDINAL HEALTH

## (undated) DEVICE — INTENDED FOR TISSUE SEPARATION, AND OTHER PROCEDURES THAT REQUIRE A SHARP SURGICAL BLADE TO PUNCTURE OR CUT.: Brand: BARD-PARKER SAFETY BLADES SIZE 15, STERILE

## (undated) DEVICE — NEEDLE 25G X 1 1/2

## (undated) DEVICE — CULTURE TUBE ANAEROBIC

## (undated) DEVICE — SYRINGE 20ML LL

## (undated) DEVICE — SYRINGE 10ML LL

## (undated) DEVICE — LIGHT HANDLE COVER SLEEVE DISP BLUE STELLAR

## (undated) DEVICE — IV CATH INTROCAN 18G X 1 1/4 SAFETY

## (undated) DEVICE — SPONGE STICK WITH PVP-I: Brand: KENDALL

## (undated) DEVICE — GLOVE SRG BIOGEL ORTHOPEDIC 7.5

## (undated) DEVICE — CULTURE TUBE AEROBIC